# Patient Record
Sex: MALE | Race: WHITE | NOT HISPANIC OR LATINO
[De-identification: names, ages, dates, MRNs, and addresses within clinical notes are randomized per-mention and may not be internally consistent; named-entity substitution may affect disease eponyms.]

---

## 2017-01-09 ENCOUNTER — APPOINTMENT (OUTPATIENT)
Dept: CARDIOLOGY | Facility: CLINIC | Age: 57
End: 2017-01-09

## 2017-01-09 ENCOUNTER — NON-APPOINTMENT (OUTPATIENT)
Age: 57
End: 2017-01-09

## 2017-01-09 VITALS
DIASTOLIC BLOOD PRESSURE: 68 MMHG | OXYGEN SATURATION: 98 % | HEART RATE: 87 BPM | BODY MASS INDEX: 29.52 KG/M2 | HEIGHT: 77 IN | TEMPERATURE: 97.9 F | WEIGHT: 250 LBS | SYSTOLIC BLOOD PRESSURE: 110 MMHG

## 2017-01-23 ENCOUNTER — APPOINTMENT (OUTPATIENT)
Dept: CARDIOLOGY | Facility: CLINIC | Age: 57
End: 2017-01-23

## 2017-02-24 ENCOUNTER — APPOINTMENT (OUTPATIENT)
Dept: CARDIOLOGY | Facility: CLINIC | Age: 57
End: 2017-02-24

## 2017-04-06 ENCOUNTER — MEDICATION RENEWAL (OUTPATIENT)
Age: 57
End: 2017-04-06

## 2017-04-17 ENCOUNTER — APPOINTMENT (OUTPATIENT)
Dept: CARDIOLOGY | Facility: CLINIC | Age: 57
End: 2017-04-17

## 2017-04-17 RX ADMIN — REGADENOSON 0.4 MG/5ML: 0.08 INJECTION, SOLUTION INTRAVENOUS at 00:00

## 2017-04-20 ENCOUNTER — APPOINTMENT (OUTPATIENT)
Dept: CARDIOLOGY | Facility: CLINIC | Age: 57
End: 2017-04-20

## 2017-04-20 VITALS
HEART RATE: 101 BPM | DIASTOLIC BLOOD PRESSURE: 75 MMHG | WEIGHT: 249 LBS | BODY MASS INDEX: 29.4 KG/M2 | OXYGEN SATURATION: 98 % | SYSTOLIC BLOOD PRESSURE: 111 MMHG | HEIGHT: 77 IN

## 2017-08-24 ENCOUNTER — APPOINTMENT (OUTPATIENT)
Dept: CARDIOLOGY | Facility: CLINIC | Age: 57
End: 2017-08-24
Payer: COMMERCIAL

## 2017-08-24 ENCOUNTER — NON-APPOINTMENT (OUTPATIENT)
Age: 57
End: 2017-08-24

## 2017-08-24 VITALS
DIASTOLIC BLOOD PRESSURE: 86 MMHG | BODY MASS INDEX: 29.99 KG/M2 | SYSTOLIC BLOOD PRESSURE: 135 MMHG | HEIGHT: 77 IN | OXYGEN SATURATION: 98 % | HEART RATE: 92 BPM | WEIGHT: 254 LBS

## 2017-08-24 PROCEDURE — 93000 ELECTROCARDIOGRAM COMPLETE: CPT | Mod: 59

## 2017-08-24 PROCEDURE — 93289 INTERROG DEVICE EVAL HEART: CPT

## 2017-08-24 PROCEDURE — 99214 OFFICE O/P EST MOD 30 MIN: CPT | Mod: 25

## 2017-11-09 ENCOUNTER — APPOINTMENT (OUTPATIENT)
Dept: CARDIOLOGY | Facility: CLINIC | Age: 57
End: 2017-11-09
Payer: COMMERCIAL

## 2017-11-09 ENCOUNTER — NON-APPOINTMENT (OUTPATIENT)
Age: 57
End: 2017-11-09

## 2017-11-09 VITALS
SYSTOLIC BLOOD PRESSURE: 110 MMHG | BODY MASS INDEX: 30.34 KG/M2 | HEART RATE: 90 BPM | WEIGHT: 257 LBS | DIASTOLIC BLOOD PRESSURE: 74 MMHG | HEIGHT: 77 IN | OXYGEN SATURATION: 97 %

## 2017-11-09 PROCEDURE — 93289 INTERROG DEVICE EVAL HEART: CPT

## 2017-11-09 PROCEDURE — 99214 OFFICE O/P EST MOD 30 MIN: CPT | Mod: 25

## 2017-11-09 PROCEDURE — 93000 ELECTROCARDIOGRAM COMPLETE: CPT | Mod: 59

## 2017-11-09 RX ORDER — BLOOD SUGAR DIAGNOSTIC
STRIP MISCELLANEOUS
Qty: 200 | Refills: 0 | Status: ACTIVE | COMMUNITY
Start: 2016-10-11

## 2018-01-05 ENCOUNTER — EMERGENCY (EMERGENCY)
Facility: HOSPITAL | Age: 58
LOS: 1 days | Discharge: HOME | End: 2018-01-05
Attending: EMERGENCY MEDICINE | Admitting: EMERGENCY MEDICINE
Payer: COMMERCIAL

## 2018-01-05 VITALS
SYSTOLIC BLOOD PRESSURE: 142 MMHG | DIASTOLIC BLOOD PRESSURE: 87 MMHG | RESPIRATION RATE: 17 BRPM | HEART RATE: 97 BPM | TEMPERATURE: 98 F | OXYGEN SATURATION: 98 %

## 2018-01-05 VITALS
SYSTOLIC BLOOD PRESSURE: 139 MMHG | TEMPERATURE: 98 F | HEART RATE: 94 BPM | RESPIRATION RATE: 18 BRPM | OXYGEN SATURATION: 98 % | DIASTOLIC BLOOD PRESSURE: 85 MMHG

## 2018-01-05 LAB
ALBUMIN SERPL ELPH-MCNC: 4.4 G/DL — SIGNIFICANT CHANGE UP (ref 3.3–5)
ALP SERPL-CCNC: 80 U/L — SIGNIFICANT CHANGE UP (ref 40–120)
ALT FLD-CCNC: 39 U/L RC — SIGNIFICANT CHANGE UP (ref 10–45)
ANION GAP SERPL CALC-SCNC: 14 MMOL/L — SIGNIFICANT CHANGE UP (ref 5–17)
APTT BLD: 31.2 SEC — SIGNIFICANT CHANGE UP (ref 27.5–37.4)
AST SERPL-CCNC: 25 U/L — SIGNIFICANT CHANGE UP (ref 10–40)
BASOPHILS # BLD AUTO: 0.1 K/UL — SIGNIFICANT CHANGE UP (ref 0–0.2)
BASOPHILS NFR BLD AUTO: 0.8 % — SIGNIFICANT CHANGE UP (ref 0–2)
BILIRUB SERPL-MCNC: 0.4 MG/DL — SIGNIFICANT CHANGE UP (ref 0.2–1.2)
BUN SERPL-MCNC: 22 MG/DL — SIGNIFICANT CHANGE UP (ref 7–23)
CALCIUM SERPL-MCNC: 10.1 MG/DL — SIGNIFICANT CHANGE UP (ref 8.4–10.5)
CHLORIDE SERPL-SCNC: 101 MMOL/L — SIGNIFICANT CHANGE UP (ref 96–108)
CK MB CFR SERPL CALC: 6.9 NG/ML — HIGH (ref 0–6.7)
CO2 SERPL-SCNC: 22 MMOL/L — SIGNIFICANT CHANGE UP (ref 22–31)
CREAT SERPL-MCNC: 1.27 MG/DL — SIGNIFICANT CHANGE UP (ref 0.5–1.3)
EOSINOPHIL # BLD AUTO: 0.1 K/UL — SIGNIFICANT CHANGE UP (ref 0–0.5)
EOSINOPHIL NFR BLD AUTO: 1.5 % — SIGNIFICANT CHANGE UP (ref 0–6)
GLUCOSE SERPL-MCNC: 168 MG/DL — HIGH (ref 70–99)
HCT VFR BLD CALC: 44.4 % — SIGNIFICANT CHANGE UP (ref 39–50)
HGB BLD-MCNC: 15.6 G/DL — SIGNIFICANT CHANGE UP (ref 13–17)
INR BLD: 1.04 RATIO — SIGNIFICANT CHANGE UP (ref 0.88–1.16)
LYMPHOCYTES # BLD AUTO: 2.2 K/UL — SIGNIFICANT CHANGE UP (ref 1–3.3)
LYMPHOCYTES # BLD AUTO: 21.7 % — SIGNIFICANT CHANGE UP (ref 13–44)
MCHC RBC-ENTMCNC: 28.7 PG — SIGNIFICANT CHANGE UP (ref 27–34)
MCHC RBC-ENTMCNC: 35.1 GM/DL — SIGNIFICANT CHANGE UP (ref 32–36)
MCV RBC AUTO: 81.9 FL — SIGNIFICANT CHANGE UP (ref 80–100)
MONOCYTES # BLD AUTO: 0.8 K/UL — SIGNIFICANT CHANGE UP (ref 0–0.9)
MONOCYTES NFR BLD AUTO: 7.9 % — SIGNIFICANT CHANGE UP (ref 2–14)
NEUTROPHILS # BLD AUTO: 6.8 K/UL — SIGNIFICANT CHANGE UP (ref 1.8–7.4)
NEUTROPHILS NFR BLD AUTO: 68.2 % — SIGNIFICANT CHANGE UP (ref 43–77)
NT-PROBNP SERPL-SCNC: 123 PG/ML — SIGNIFICANT CHANGE UP (ref 0–300)
PLATELET # BLD AUTO: 177 K/UL — SIGNIFICANT CHANGE UP (ref 150–400)
POTASSIUM SERPL-MCNC: 4.2 MMOL/L — SIGNIFICANT CHANGE UP (ref 3.5–5.3)
POTASSIUM SERPL-SCNC: 4.2 MMOL/L — SIGNIFICANT CHANGE UP (ref 3.5–5.3)
PROT SERPL-MCNC: 7.8 G/DL — SIGNIFICANT CHANGE UP (ref 6–8.3)
PROTHROM AB SERPL-ACNC: 11.2 SEC — SIGNIFICANT CHANGE UP (ref 9.8–12.7)
RBC # BLD: 5.43 M/UL — SIGNIFICANT CHANGE UP (ref 4.2–5.8)
RBC # FLD: 13.4 % — SIGNIFICANT CHANGE UP (ref 10.3–14.5)
SODIUM SERPL-SCNC: 137 MMOL/L — SIGNIFICANT CHANGE UP (ref 135–145)
TROPONIN T SERPL-MCNC: <0.01 NG/ML — SIGNIFICANT CHANGE UP (ref 0–0.06)
WBC # BLD: 9.9 K/UL — SIGNIFICANT CHANGE UP (ref 3.8–10.5)
WBC # FLD AUTO: 9.9 K/UL — SIGNIFICANT CHANGE UP (ref 3.8–10.5)

## 2018-01-05 PROCEDURE — 93010 ELECTROCARDIOGRAM REPORT: CPT

## 2018-01-05 PROCEDURE — 85610 PROTHROMBIN TIME: CPT

## 2018-01-05 PROCEDURE — 99285 EMERGENCY DEPT VISIT HI MDM: CPT | Mod: 25

## 2018-01-05 PROCEDURE — 99283 EMERGENCY DEPT VISIT LOW MDM: CPT | Mod: 25

## 2018-01-05 PROCEDURE — 83880 ASSAY OF NATRIURETIC PEPTIDE: CPT

## 2018-01-05 PROCEDURE — 71045 X-RAY EXAM CHEST 1 VIEW: CPT | Mod: 26

## 2018-01-05 PROCEDURE — 84484 ASSAY OF TROPONIN QUANT: CPT

## 2018-01-05 PROCEDURE — 80053 COMPREHEN METABOLIC PANEL: CPT

## 2018-01-05 PROCEDURE — 93005 ELECTROCARDIOGRAM TRACING: CPT

## 2018-01-05 PROCEDURE — 82553 CREATINE MB FRACTION: CPT

## 2018-01-05 PROCEDURE — 85730 THROMBOPLASTIN TIME PARTIAL: CPT

## 2018-01-05 PROCEDURE — 85027 COMPLETE CBC AUTOMATED: CPT

## 2018-01-05 PROCEDURE — 71045 X-RAY EXAM CHEST 1 VIEW: CPT

## 2018-01-05 NOTE — ED ADULT NURSE NOTE - OBJECTIVE STATEMENT
57 year old male presents ambulatory to ED through waiting room complaining of shortness of breath. History of DM, CAD with stents, AICD. States he was using  and started feeling unwell. Sat down for half an hour, felt better and continued clearing snow. Wife states he appeared short of breath. Patient endorses difficulty catching breath. Denies HA, CP, abd pain, NVD, fevers, chills. Patient undressed and placed into gown, call bell in hand and side rails up with bed in lowest position for safety. blanket provided. Comfort and safety provided.

## 2018-01-05 NOTE — ED PROVIDER NOTE - OBJECTIVE STATEMENT
Dr. Davidson Note: 57M with 2 episodes, yesterday and then today, on dyspnea while exerting himself in the extreme cold, resolved after returning indoors.  Does NOT have dyspnea on exertion otherwise, able to go down the caban, up the stairs without dyspnea nor chest pain.  Never had chest pain during this.  Put on his nicotene patch and took Proair once thinking this may help as well.

## 2018-01-05 NOTE — ED PROVIDER NOTE - MEDICAL DECISION MAKING DETAILS
Dr. Davidson Note: likely cold induced bronchospasm, but given cardiac hx, will exclude acute ACS, deemed low risk at this time given clinical picture and normal EKG.

## 2018-02-05 ENCOUNTER — INPATIENT (INPATIENT)
Facility: HOSPITAL | Age: 58
LOS: 2 days | Discharge: HOME | DRG: 247 | End: 2018-02-08
Attending: STUDENT IN AN ORGANIZED HEALTH CARE EDUCATION/TRAINING PROGRAM | Admitting: INTERNAL MEDICINE
Payer: MEDICARE

## 2018-02-05 VITALS — WEIGHT: 249.12 LBS

## 2018-02-05 DIAGNOSIS — E11.9 TYPE 2 DIABETES MELLITUS WITHOUT COMPLICATIONS: ICD-10-CM

## 2018-02-05 DIAGNOSIS — Z95.810 PRESENCE OF AUTOMATIC (IMPLANTABLE) CARDIAC DEFIBRILLATOR: ICD-10-CM

## 2018-02-05 DIAGNOSIS — I21.3 ST ELEVATION (STEMI) MYOCARDIAL INFARCTION OF UNSPECIFIED SITE: ICD-10-CM

## 2018-02-05 LAB
ALBUMIN SERPL ELPH-MCNC: 4.4 G/DL — SIGNIFICANT CHANGE UP (ref 3.3–5)
ALP SERPL-CCNC: 88 U/L — SIGNIFICANT CHANGE UP (ref 40–120)
ALT FLD-CCNC: 34 U/L RC — SIGNIFICANT CHANGE UP (ref 10–45)
ANION GAP SERPL CALC-SCNC: 13 MMOL/L — SIGNIFICANT CHANGE UP (ref 5–17)
APTT BLD: 31.1 SEC — SIGNIFICANT CHANGE UP (ref 27.5–37.4)
AST SERPL-CCNC: 16 U/L — SIGNIFICANT CHANGE UP (ref 10–40)
BASOPHILS # BLD AUTO: 0.1 K/UL — SIGNIFICANT CHANGE UP (ref 0–0.2)
BASOPHILS NFR BLD AUTO: 0.6 % — SIGNIFICANT CHANGE UP (ref 0–2)
BILIRUB SERPL-MCNC: 0.3 MG/DL — SIGNIFICANT CHANGE UP (ref 0.2–1.2)
BUN SERPL-MCNC: 20 MG/DL — SIGNIFICANT CHANGE UP (ref 7–23)
CALCIUM SERPL-MCNC: 9.7 MG/DL — SIGNIFICANT CHANGE UP (ref 8.4–10.5)
CHLORIDE SERPL-SCNC: 98 MMOL/L — SIGNIFICANT CHANGE UP (ref 96–108)
CK MB BLD-MCNC: 2.6 % — SIGNIFICANT CHANGE UP (ref 0–3.5)
CK MB CFR SERPL CALC: 8.5 NG/ML — HIGH (ref 0–6.7)
CK SERPL-CCNC: 323 U/L — HIGH (ref 30–200)
CO2 SERPL-SCNC: 23 MMOL/L — SIGNIFICANT CHANGE UP (ref 22–31)
CREAT SERPL-MCNC: 1.36 MG/DL — HIGH (ref 0.5–1.3)
EOSINOPHIL # BLD AUTO: 0.1 K/UL — SIGNIFICANT CHANGE UP (ref 0–0.5)
EOSINOPHIL NFR BLD AUTO: 1.4 % — SIGNIFICANT CHANGE UP (ref 0–6)
GLUCOSE BLDC GLUCOMTR-MCNC: 283 MG/DL — HIGH (ref 70–99)
GLUCOSE SERPL-MCNC: 311 MG/DL — HIGH (ref 70–99)
HCT VFR BLD CALC: 45 % — SIGNIFICANT CHANGE UP (ref 39–50)
HGB BLD-MCNC: 16.1 G/DL — SIGNIFICANT CHANGE UP (ref 13–17)
INR BLD: 1.14 RATIO — SIGNIFICANT CHANGE UP (ref 0.88–1.16)
LYMPHOCYTES # BLD AUTO: 2.8 K/UL — SIGNIFICANT CHANGE UP (ref 1–3.3)
LYMPHOCYTES # BLD AUTO: 26.4 % — SIGNIFICANT CHANGE UP (ref 13–44)
MCHC RBC-ENTMCNC: 28.9 PG — SIGNIFICANT CHANGE UP (ref 27–34)
MCHC RBC-ENTMCNC: 35.8 GM/DL — SIGNIFICANT CHANGE UP (ref 32–36)
MCV RBC AUTO: 80.7 FL — SIGNIFICANT CHANGE UP (ref 80–100)
MONOCYTES # BLD AUTO: 1 K/UL — HIGH (ref 0–0.9)
MONOCYTES NFR BLD AUTO: 9.7 % — SIGNIFICANT CHANGE UP (ref 2–14)
NEUTROPHILS # BLD AUTO: 6.6 K/UL — SIGNIFICANT CHANGE UP (ref 1.8–7.4)
NEUTROPHILS NFR BLD AUTO: 62 % — SIGNIFICANT CHANGE UP (ref 43–77)
NT-PROBNP SERPL-SCNC: 300 PG/ML — SIGNIFICANT CHANGE UP (ref 0–300)
PLATELET # BLD AUTO: 205 K/UL — SIGNIFICANT CHANGE UP (ref 150–400)
POTASSIUM SERPL-MCNC: 4.2 MMOL/L — SIGNIFICANT CHANGE UP (ref 3.5–5.3)
POTASSIUM SERPL-SCNC: 4.2 MMOL/L — SIGNIFICANT CHANGE UP (ref 3.5–5.3)
PROT SERPL-MCNC: 7.9 G/DL — SIGNIFICANT CHANGE UP (ref 6–8.3)
PROTHROM AB SERPL-ACNC: 12.3 SEC — SIGNIFICANT CHANGE UP (ref 9.8–12.7)
RBC # BLD: 5.57 M/UL — SIGNIFICANT CHANGE UP (ref 4.2–5.8)
RBC # FLD: 13.3 % — SIGNIFICANT CHANGE UP (ref 10.3–14.5)
SODIUM SERPL-SCNC: 134 MMOL/L — LOW (ref 135–145)
TROPONIN T SERPL-MCNC: <0.01 NG/ML — SIGNIFICANT CHANGE UP (ref 0–0.06)
WBC # BLD: 10.7 K/UL — HIGH (ref 3.8–10.5)
WBC # FLD AUTO: 10.7 K/UL — HIGH (ref 3.8–10.5)

## 2018-02-05 PROCEDURE — 92941 PRQ TRLML REVSC TOT OCCL AMI: CPT | Mod: RI

## 2018-02-05 PROCEDURE — 99291 CRITICAL CARE FIRST HOUR: CPT | Mod: 25

## 2018-02-05 PROCEDURE — 93010 ELECTROCARDIOGRAM REPORT: CPT

## 2018-02-05 PROCEDURE — 71045 X-RAY EXAM CHEST 1 VIEW: CPT | Mod: 26

## 2018-02-05 PROCEDURE — 99223 1ST HOSP IP/OBS HIGH 75: CPT | Mod: GC,25

## 2018-02-05 PROCEDURE — 99152 MOD SED SAME PHYS/QHP 5/>YRS: CPT

## 2018-02-05 PROCEDURE — 93458 L HRT ARTERY/VENTRICLE ANGIO: CPT | Mod: 26,59

## 2018-02-05 RX ORDER — METFORMIN HYDROCHLORIDE 850 MG/1
0 TABLET ORAL
Qty: 0 | Refills: 0 | COMMUNITY

## 2018-02-05 RX ORDER — HEPARIN SODIUM 5000 [USP'U]/ML
INJECTION INTRAVENOUS; SUBCUTANEOUS
Qty: 25000 | Refills: 0 | Status: DISCONTINUED | OUTPATIENT
Start: 2018-02-05 | End: 2018-02-05

## 2018-02-05 RX ORDER — TAMSULOSIN HYDROCHLORIDE 0.4 MG/1
0.4 CAPSULE ORAL AT BEDTIME
Qty: 0 | Refills: 0 | Status: DISCONTINUED | OUTPATIENT
Start: 2018-02-05 | End: 2018-02-08

## 2018-02-05 RX ORDER — NITROGLYCERIN 6.5 MG
0.4 CAPSULE, EXTENDED RELEASE ORAL
Qty: 0 | Refills: 0 | Status: DISCONTINUED | OUTPATIENT
Start: 2018-02-05 | End: 2018-02-05

## 2018-02-05 RX ORDER — INSULIN LISPRO 100/ML
VIAL (ML) SUBCUTANEOUS
Qty: 0 | Refills: 0 | Status: DISCONTINUED | OUTPATIENT
Start: 2018-02-05 | End: 2018-02-06

## 2018-02-05 RX ORDER — ASPIRIN/CALCIUM CARB/MAGNESIUM 324 MG
81 TABLET ORAL DAILY
Qty: 0 | Refills: 0 | Status: DISCONTINUED | OUTPATIENT
Start: 2018-02-05 | End: 2018-02-08

## 2018-02-05 RX ORDER — DEXTROSE 50 % IN WATER 50 %
12.5 SYRINGE (ML) INTRAVENOUS ONCE
Qty: 0 | Refills: 0 | Status: DISCONTINUED | OUTPATIENT
Start: 2018-02-05 | End: 2018-02-08

## 2018-02-05 RX ORDER — DEXTROSE 50 % IN WATER 50 %
1 SYRINGE (ML) INTRAVENOUS ONCE
Qty: 0 | Refills: 0 | Status: DISCONTINUED | OUTPATIENT
Start: 2018-02-05 | End: 2018-02-08

## 2018-02-05 RX ORDER — INSULIN GLARGINE 100 [IU]/ML
25 INJECTION, SOLUTION SUBCUTANEOUS EVERY MORNING
Qty: 0 | Refills: 0 | Status: DISCONTINUED | OUTPATIENT
Start: 2018-02-05 | End: 2018-02-06

## 2018-02-05 RX ORDER — SODIUM CHLORIDE 9 MG/ML
1000 INJECTION, SOLUTION INTRAVENOUS
Qty: 0 | Refills: 0 | Status: DISCONTINUED | OUTPATIENT
Start: 2018-02-05 | End: 2018-02-08

## 2018-02-05 RX ORDER — ATORVASTATIN CALCIUM 80 MG/1
80 TABLET, FILM COATED ORAL AT BEDTIME
Qty: 0 | Refills: 0 | Status: DISCONTINUED | OUTPATIENT
Start: 2018-02-05 | End: 2018-02-08

## 2018-02-05 RX ORDER — DEXTROSE 50 % IN WATER 50 %
25 SYRINGE (ML) INTRAVENOUS ONCE
Qty: 0 | Refills: 0 | Status: DISCONTINUED | OUTPATIENT
Start: 2018-02-05 | End: 2018-02-08

## 2018-02-05 RX ORDER — HEPARIN SODIUM 5000 [USP'U]/ML
4000 INJECTION INTRAVENOUS; SUBCUTANEOUS ONCE
Qty: 0 | Refills: 0 | Status: COMPLETED | OUTPATIENT
Start: 2018-02-05 | End: 2018-02-05

## 2018-02-05 RX ORDER — NICOTINE POLACRILEX 2 MG
1 GUM BUCCAL DAILY
Qty: 0 | Refills: 0 | Status: DISCONTINUED | OUTPATIENT
Start: 2018-02-05 | End: 2018-02-08

## 2018-02-05 RX ORDER — CARVEDILOL PHOSPHATE 80 MG/1
6.25 CAPSULE, EXTENDED RELEASE ORAL EVERY 12 HOURS
Qty: 0 | Refills: 0 | Status: DISCONTINUED | OUTPATIENT
Start: 2018-02-05 | End: 2018-02-06

## 2018-02-05 RX ORDER — LOSARTAN POTASSIUM 100 MG/1
0 TABLET, FILM COATED ORAL
Qty: 0 | Refills: 0 | COMMUNITY

## 2018-02-05 RX ORDER — CLOPIDOGREL BISULFATE 75 MG/1
75 TABLET, FILM COATED ORAL DAILY
Qty: 0 | Refills: 0 | Status: DISCONTINUED | OUTPATIENT
Start: 2018-02-05 | End: 2018-02-06

## 2018-02-05 RX ORDER — INSULIN GLARGINE 100 [IU]/ML
50 INJECTION, SOLUTION SUBCUTANEOUS AT BEDTIME
Qty: 0 | Refills: 0 | Status: DISCONTINUED | OUTPATIENT
Start: 2018-02-05 | End: 2018-02-06

## 2018-02-05 RX ORDER — INSULIN LISPRO 100/ML
3 VIAL (ML) SUBCUTANEOUS ONCE
Qty: 0 | Refills: 0 | Status: COMPLETED | OUTPATIENT
Start: 2018-02-05 | End: 2018-02-05

## 2018-02-05 RX ORDER — TICAGRELOR 90 MG/1
180 TABLET ORAL ONCE
Qty: 0 | Refills: 0 | Status: COMPLETED | OUTPATIENT
Start: 2018-02-05 | End: 2018-02-05

## 2018-02-05 RX ORDER — INSULIN LISPRO 100/ML
VIAL (ML) SUBCUTANEOUS AT BEDTIME
Qty: 0 | Refills: 0 | Status: DISCONTINUED | OUTPATIENT
Start: 2018-02-05 | End: 2018-02-06

## 2018-02-05 RX ORDER — GLUCAGON INJECTION, SOLUTION 0.5 MG/.1ML
1 INJECTION, SOLUTION SUBCUTANEOUS ONCE
Qty: 0 | Refills: 0 | Status: DISCONTINUED | OUTPATIENT
Start: 2018-02-05 | End: 2018-02-08

## 2018-02-05 RX ORDER — INFLUENZA VIRUS VACCINE 15; 15; 15; 15 UG/.5ML; UG/.5ML; UG/.5ML; UG/.5ML
0.5 SUSPENSION INTRAMUSCULAR ONCE
Qty: 0 | Refills: 0 | Status: COMPLETED | OUTPATIENT
Start: 2018-02-05 | End: 2018-02-08

## 2018-02-05 RX ADMIN — Medication 0.4 MILLIGRAM(S): at 16:03

## 2018-02-05 RX ADMIN — INSULIN GLARGINE 50 UNIT(S): 100 INJECTION, SOLUTION SUBCUTANEOUS at 22:01

## 2018-02-05 RX ADMIN — Medication 2: at 22:03

## 2018-02-05 RX ADMIN — HEPARIN SODIUM 4000 UNIT(S): 5000 INJECTION INTRAVENOUS; SUBCUTANEOUS at 16:03

## 2018-02-05 RX ADMIN — ATORVASTATIN CALCIUM 80 MILLIGRAM(S): 80 TABLET, FILM COATED ORAL at 21:48

## 2018-02-05 RX ADMIN — TICAGRELOR 180 MILLIGRAM(S): 90 TABLET ORAL at 16:03

## 2018-02-05 RX ADMIN — Medication 30 MILLILITER(S): at 23:53

## 2018-02-05 RX ADMIN — TAMSULOSIN HYDROCHLORIDE 0.4 MILLIGRAM(S): 0.4 CAPSULE ORAL at 22:04

## 2018-02-05 NOTE — ED PROVIDER NOTE - OBJECTIVE STATEMENT
attending Quinten: 57yM smoker with h/o CAD/MI s/p 3 stents, AICD, DMII on insulin sent in by PMD for chest pain since last night, nonexertional, mid sternal, squeezing with radiation to RUE. Given full dose ASA in office. Last stent placed in 2012.

## 2018-02-05 NOTE — ED PROVIDER NOTE - PROGRESS NOTE DETAILS
attending Quinten: As per note from PMD [facility] office, pt to be admitted to Dr. Brewer. Voicemail left for Dr. Brewer. attending Quinten: Pt with another episode of chest pain. Will repeat EKG and call cards for cath consult.

## 2018-02-05 NOTE — ED ADULT NURSE NOTE - OBJECTIVE STATEMENT
57y m pt arrived in er c/o chest pain to center chest; pt has hx of cardiac stent and has aicd present; pt states pain comes and goes; aox3; no resp distress; no abd pain; no n/v/d; no fever/chills; pt denies sob; denies diaphoresis; cardiac resident at bedside; iv placed; labs drawn; pt medicated; resident and er tech took pt to cath lab

## 2018-02-05 NOTE — H&P ADULT - HISTORY OF PRESENT ILLNESS
This is a 57 year old man Active Smoker with past medical history of MI/CAD with Stents, DMT2 requiring Insulin, HFrEF with ICD, HTN, HLD presents to [facility] ED from MD office with intermittent, non-radiating midsternal chest pain X 1 day in early stages 5/10 relieved with belching on presentation to MD office started radiating to right upper arm with intensity increasing to 10/10.  In ED, ECG showed VTE in V2-V3, received Aspirin, Plavix, Heparin Load and brought urgently to cath lab receiving NERI X 1 to Ramus via Right Radial Artery This is a 57 year old man Active Smoker with past medical history of MI/CAD with Stents, DMT2 requiring Insulin, HFrEF (37%) with ICD ([employer]), HTN, HLD presents to [facility] ED from MD office with intermittent, non-radiating midsternal chest pain X 1 day in early stages 5/10 relieved with belching on presentation to MD office started radiating to right upper arm with intensity increasing to 10/10.  In ED, ECG showed VTE in V2-V3, received Aspirin, Plavix, Heparin Load and brought urgently to cath lab receiving NERI X 1 to Ramus via Right Radial Artery.  Patient states that several family members have the flu.  He denies fever, chills, cough, SOB, abdominal pain, N/V/D.

## 2018-02-05 NOTE — CHART NOTE - NSCHARTNOTEFT_GEN_A_CORE
This is a 57 year old man Active Smoker with past medical history of MI/CAD with Stents, DMT2 requiring Insulin, HFrEF (37%) with ICD ([employer]. Marc), HTN, HLD presents to [facility] ED from MD office with intermittent, non-radiating midsternal chest pain X 1 day in early stages 5/10 relieved with belching on presentation to MD office started radiating to right upper arm with intensity increasing to 10/10.  In ED, ECG showed VTE in V2-V3, received Aspirin, Plavix, Heparin Load and brought urgently to cath lab receiving NERI X 1 to Ramus via Right Radial Arterial access.  R Radial Arterial Band removed.  20 minutes of manual compression applied until hemostasis achieved.  Distal digits with <3 seconds capillary refill.  Denies pain, tingling, numbness.    Mike Fisher, ANP

## 2018-02-05 NOTE — H&P ADULT - NSHPPHYSICALEXAM_GEN_ALL_CORE
General:   Alert, oriented X 3and cooperative. No acute distress    Skin:  Normal in appearance, texture, and temperature     HEENT:  Scalp normal. Pupils equally round, 4 mm, reactive to light and accommodation, sclera and conjunctiva normal. Nasal mucosa normal. Oral pharynx is normal without erythema or exudate. Tongue and gums are normal.    Neck:  No abnormal adenopathy in the cervical or supraclavicular areas. Trachea is midline and thyroid gland is normal without masses. No JVD    Chest:  Lungs are clear to auscultation and percussion bilaterally S1S2 RRR    Abdomen:  The abdomen is symmetrical without distention; bowel sounds are normal in quality and intensity in all areas. No hepatomegaly.    Extremities:  Right Radial Artery Band. No cyanosis, clubbing, or edema are noted. Peripheral pulses in anterior tibial, dorsalis pedis, brachial, and radial areas are normal.

## 2018-02-05 NOTE — CHART NOTE - NSCHARTNOTEFT_GEN_A_CORE
====================  CCU MIDNIGHT ROUNDS  ====================    SEVERIANO MARTINEZ  [id number]  Patient is a 57y old  Male who presents with a chief complaint of Chest Pain, AWSTEMI (05 Feb 2018 18:22)      ====================  SUMMARY:  ====================      ====================  NEW EVENTS:  ====================    MEDICATIONS  (STANDING):  aspirin  chewable 81 milliGRAM(s) Oral daily  atorvastatin 80 milliGRAM(s) Oral at bedtime  carvedilol 6.25 milliGRAM(s) Oral every 12 hours  clopidogrel Tablet 75 milliGRAM(s) Oral daily  dextrose 5%. 1000 milliLiter(s) (50 mL/Hr) IV Continuous <Continuous>  dextrose 50% Injectable 12.5 Gram(s) IV Push once  dextrose 50% Injectable 25 Gram(s) IV Push once  dextrose 50% Injectable 25 Gram(s) IV Push once  influenza   Vaccine 0.5 milliLiter(s) IntraMuscular once  insulin glargine Injectable (LANTUS) 25 Unit(s) SubCutaneous every morning  insulin glargine Injectable (LANTUS) 50 Unit(s) SubCutaneous at bedtime  insulin lispro (HumaLOG) corrective regimen sliding scale   SubCutaneous three times a day before meals  insulin lispro (HumaLOG) corrective regimen sliding scale   SubCutaneous at bedtime  nicotine -  14 mG/24Hr(s) Patch 1 patch Transdermal daily  tamsulosin 0.4 milliGRAM(s) Oral at bedtime    MEDICATIONS  (PRN):  dextrose Gel 1 Dose(s) Oral once PRN Blood Glucose LESS THAN 70 milliGRAM(s)/deciLiter  glucagon  Injectable 1 milliGRAM(s) IntraMuscular once PRN Glucose <70 milliGRAM(s)/deciLiter      ====================  VITALS (Last 12 hrs):  ====================    T(C): 36.8 (02-05-18 @ 19:00), Max: 36.9 (02-05-18 @ 17:55)  T(F): 98.3 (02-05-18 @ 19:00), Max: 98.5 (02-05-18 @ 17:55)  HR: 98 (02-05-18 @ 22:00) (92 - 104)  BP: 113/60 (02-05-18 @ 22:00) (107/73 - 153/60)  BP(mean): 80 (02-05-18 @ 22:00) (80 - 106)  ABP: --  ABP(mean): --  RR: 20 (02-05-18 @ 22:00) (17 - 28)  SpO2: 96% (02-05-18 @ 22:00) (96% - 98%)  Wt(kg): --  CVP(mm Hg): --  CVP(cm H2O): --  CO: --  CI: --  PA: --  PA(mean): --  PCWP: --  SVR: --  PVR: --    I&O's Summary          ====================  NEW LABS:  ====================      02-05    134<L>  |  98  |  20  ----------------------------<  311<H>  4.2   |  23  |  1.36<H>    Ca    9.7      05 Feb 2018 16:10    TPro  7.9  /  Alb  4.4  /  TBili  0.3  /  DBili  x   /  AST  16  /  ALT  34  /  AlkPhos  88  02-05    PT/INR - ( 05 Feb 2018 16:10 )   PT: 12.3 sec;   INR: 1.14 ratio         PTT - ( 05 Feb 2018 16:10 )  PTT:31.1 sec  Troponin T, Serum: <0.01 ng/mL (02-05-18 @ 16:10)  Creatine Kinase, Serum: 323 U/L <H> (02-05-18 @ 16:10)    CKMB Units: 8.5 ng/mL (02-05 @ 16:10)        ====================  PLAN:  ====================  -       Rachel MILTONU NP  Beeper #[id number]  Spectra # [phone] ====================  CCU MIDNIGHT ROUNDS  ====================    SEVERIANO MARTINEZ  [id number]  Patient is a 57y old  Male who presents with a chief complaint of Chest Pain, AWSTEMI (05 Feb 2018 18:22)    ====================  SUMMARY:  ====================  This is a 57 year old man Active Smoker with past medical history of MI/CAD with Stents, DMT2 requiring Insulin, HFrEF (37%) with ICD ([employer]. Marc), HTN, HLD presents to [facility] ED from MD office with intermittent, non-radiating midsternal chest pain X 1 day in early stages 5/10 relieved with belching on presentation to MD office started radiating to right upper arm with intensity increasing to 10/10.  In ED, ECG showed VTE in V2-V3, received Aspirin, Plavix, Heparin Load and brought urgently to cath lab receiving NERI X 1 to Ramus via Right Radial Artery.    ====================  NEW EVENTS:  ====================  Denies CP, VSS, no cardiac arrhythmias    MEDICATIONS  (STANDING):  aspirin  chewable 81 milliGRAM(s) Oral daily  atorvastatin 80 milliGRAM(s) Oral at bedtime  carvedilol 6.25 milliGRAM(s) Oral every 12 hours  clopidogrel Tablet 75 milliGRAM(s) Oral daily  dextrose 5%. 1000 milliLiter(s) (50 mL/Hr) IV Continuous <Continuous>  dextrose 50% Injectable 12.5 Gram(s) IV Push once  dextrose 50% Injectable 25 Gram(s) IV Push once  dextrose 50% Injectable 25 Gram(s) IV Push once  influenza   Vaccine 0.5 milliLiter(s) IntraMuscular once  insulin glargine Injectable (LANTUS) 25 Unit(s) SubCutaneous every morning  insulin glargine Injectable (LANTUS) 50 Unit(s) SubCutaneous at bedtime  insulin lispro (HumaLOG) corrective regimen sliding scale   SubCutaneous three times a day before meals  insulin lispro (HumaLOG) corrective regimen sliding scale   SubCutaneous at bedtime  nicotine -  14 mG/24Hr(s) Patch 1 patch Transdermal daily  tamsulosin 0.4 milliGRAM(s) Oral at bedtime    MEDICATIONS  (PRN):  dextrose Gel 1 Dose(s) Oral once PRN Blood Glucose LESS THAN 70 milliGRAM(s)/deciLiter  glucagon  Injectable 1 milliGRAM(s) IntraMuscular once PRN Glucose <70 milliGRAM(s)/deciLiter    ====================  VITALS (Last 12 hrs):  ====================    T(C): 36.8 (02-05-18 @ 19:00), Max: 36.9 (02-05-18 @ 17:55)  T(F): 98.3 (02-05-18 @ 19:00), Max: 98.5 (02-05-18 @ 17:55)  HR: 98 (02-05-18 @ 22:00) (92 - 104)  BP: 113/60 (02-05-18 @ 22:00) (107/73 - 153/60)  BP(mean): 80 (02-05-18 @ 22:00) (80 - 106)  ABP: --  ABP(mean): --  RR: 20 (02-05-18 @ 22:00) (17 - 28)  SpO2: 96% (02-05-18 @ 22:00) (96% - 98%)  Wt(kg): --  CVP(mm Hg): --  CVP(cm H2O): --  CO: --  CI: --  PA: --  PA(mean): --  PCWP: --  SVR: --  PVR: --    I&O's Summary    ====================  NEW LABS:  ====================    02-05    134<L>  |  98  |  20  ----------------------------<  311<H>  4.2   |  23  |  1.36<H>    Ca    9.7      05 Feb 2018 16:10    TPro  7.9  /  Alb  4.4  /  TBili  0.3  /  DBili  x   /  AST  16  /  ALT  34  /  AlkPhos  88  02-05    PT/INR - ( 05 Feb 2018 16:10 )   PT: 12.3 sec;   INR: 1.14 ratio      PTT - ( 05 Feb 2018 16:10 )  PTT:31.1 sec  Troponin T, Serum: <0.01 ng/mL (02-05-18 @ 16:10)  Creatine Kinase, Serum: 323 U/L <H> (02-05-18 @ 16:10)    CKMB Units: 8.5 ng/mL (02-05 @ 16:10)    ====================  PLAN:  ====================  - S/P NERI to RI (100%).  found to have 100%  in LAD but with collaterals  -  Cath showed EF 25%  - Continue DAPT, BB, and high-intensity statin  - Start ACEI when BP anf renal function stable  - Cardiac enzymes x 2 negative  - DVT ppx  - Ambulate as tolerated  - TTE    Rachel Graf CCU NP  Beeper #[id number]  Spectra # [phone]

## 2018-02-05 NOTE — H&P ADULT - NSHPREVIEWOFSYSTEMS_GEN_ALL_CORE
HEENT:  No complaints of headache change in vision, nose or ear problems, or sore throat.    Cadiovascular:  Intermittent CP that evolved from non-radiating to radiating to RUE X 1 day    Gastrointestinal:  No complaints of dysphagia, nausea, vomiting, or change in stool pattern, consistency, or color.     Genitourinary:  No complaints of dysuria, nocturia, polyuria, or hematuria,    Musculoskeletal:  No complaints of arthralgias, muscle aches, or pains.    Neurological:  No complaints of weakness, numbness, or incoordination.

## 2018-02-05 NOTE — H&P ADULT - PROBLEM SELECTOR PLAN 1
s/p NERI to Ramus  Continue ASA, Plavix, Lipitor  Consider adding Coreg and ARB when tolerating  DASH Diet  Smoke Cessation education  Nicoderm patch

## 2018-02-05 NOTE — ED PROVIDER NOTE - ATTENDING CONTRIBUTION TO CARE
attending Quinten: 57yM with h/o MI s/p 3 stents, AICD sent in by PMD for chest pain since last night, nonexertional, mid sternal, squeezing with radiation to RUE. Given full dose ASA in office. Last stent placed in 2012. attending Quinten: 57yM smoker with h/o CAD/MI s/p 3 stents, AICD, DMII on insulin sent in by PMD for chest pain since last night, nonexertional, mid sternal, squeezing with radiation to RUE. Given full dose ASA in office. Last stent placed in 2012. On arrival, pt with active chest pain, initial EKG with ?anterior ST segment elevations. Concern for ACS. Will call STAT cardiology consult for cath, labs including cardiac enzymes, tele monitor, ASA, heparin gtt and admit.

## 2018-02-06 ENCOUNTER — TRANSCRIPTION ENCOUNTER (OUTPATIENT)
Age: 58
End: 2018-02-06

## 2018-02-06 DIAGNOSIS — F17.200 NICOTINE DEPENDENCE, UNSPECIFIED, UNCOMPLICATED: ICD-10-CM

## 2018-02-06 DIAGNOSIS — E83.39 OTHER DISORDERS OF PHOSPHORUS METABOLISM: ICD-10-CM

## 2018-02-06 DIAGNOSIS — E11.22 TYPE 2 DIABETES MELLITUS WITH DIABETIC CHRONIC KIDNEY DISEASE: ICD-10-CM

## 2018-02-06 DIAGNOSIS — E78.5 HYPERLIPIDEMIA, UNSPECIFIED: ICD-10-CM

## 2018-02-06 DIAGNOSIS — I21.09 ST ELEVATION (STEMI) MYOCARDIAL INFARCTION INVOLVING OTHER CORONARY ARTERY OF ANTERIOR WALL: ICD-10-CM

## 2018-02-06 DIAGNOSIS — I10 ESSENTIAL (PRIMARY) HYPERTENSION: ICD-10-CM

## 2018-02-06 DIAGNOSIS — Z29.9 ENCOUNTER FOR PROPHYLACTIC MEASURES, UNSPECIFIED: ICD-10-CM

## 2018-02-06 LAB
ALBUMIN SERPL ELPH-MCNC: 3.9 G/DL — SIGNIFICANT CHANGE UP (ref 3.3–5)
ALBUMIN SERPL ELPH-MCNC: 4.4 G/DL — SIGNIFICANT CHANGE UP (ref 3.3–5)
ALP SERPL-CCNC: 80 U/L — SIGNIFICANT CHANGE UP (ref 40–120)
ALP SERPL-CCNC: 93 U/L — SIGNIFICANT CHANGE UP (ref 40–120)
ALT FLD-CCNC: 42 U/L RC — SIGNIFICANT CHANGE UP (ref 10–45)
ALT FLD-CCNC: 45 U/L RC — SIGNIFICANT CHANGE UP (ref 10–45)
ANION GAP SERPL CALC-SCNC: 13 MMOL/L — SIGNIFICANT CHANGE UP (ref 5–17)
ANION GAP SERPL CALC-SCNC: 15 MMOL/L — SIGNIFICANT CHANGE UP (ref 5–17)
APPEARANCE UR: CLEAR — SIGNIFICANT CHANGE UP
AST SERPL-CCNC: 115 U/L — HIGH (ref 10–40)
AST SERPL-CCNC: 90 U/L — HIGH (ref 10–40)
BILIRUB SERPL-MCNC: 0.3 MG/DL — SIGNIFICANT CHANGE UP (ref 0.2–1.2)
BILIRUB SERPL-MCNC: 0.5 MG/DL — SIGNIFICANT CHANGE UP (ref 0.2–1.2)
BILIRUB UR-MCNC: NEGATIVE — SIGNIFICANT CHANGE UP
BUN SERPL-MCNC: 17 MG/DL — SIGNIFICANT CHANGE UP (ref 7–23)
BUN SERPL-MCNC: 19 MG/DL — SIGNIFICANT CHANGE UP (ref 7–23)
CALCIUM SERPL-MCNC: 9.4 MG/DL — SIGNIFICANT CHANGE UP (ref 8.4–10.5)
CALCIUM SERPL-MCNC: 9.4 MG/DL — SIGNIFICANT CHANGE UP (ref 8.4–10.5)
CHLORIDE SERPL-SCNC: 100 MMOL/L — SIGNIFICANT CHANGE UP (ref 96–108)
CHLORIDE SERPL-SCNC: 97 MMOL/L — SIGNIFICANT CHANGE UP (ref 96–108)
CHOLEST SERPL-MCNC: 188 MG/DL — SIGNIFICANT CHANGE UP (ref 10–199)
CK MB BLD-MCNC: 4.2 % — HIGH (ref 0–3.5)
CK MB BLD-MCNC: 5.5 % — HIGH (ref 0–3.5)
CK MB CFR SERPL CALC: 37.6 NG/ML — HIGH (ref 0–6.7)
CK MB CFR SERPL CALC: 61.7 NG/ML — HIGH (ref 0–6.7)
CK SERPL-CCNC: 1128 U/L — HIGH (ref 30–200)
CK SERPL-CCNC: 890 U/L — HIGH (ref 30–200)
CO2 SERPL-SCNC: 22 MMOL/L — SIGNIFICANT CHANGE UP (ref 22–31)
CO2 SERPL-SCNC: 25 MMOL/L — SIGNIFICANT CHANGE UP (ref 22–31)
COLOR SPEC: YELLOW — SIGNIFICANT CHANGE UP
COMMENT - URINE: SIGNIFICANT CHANGE UP
CREAT SERPL-MCNC: 1.23 MG/DL — SIGNIFICANT CHANGE UP (ref 0.5–1.3)
CREAT SERPL-MCNC: 1.3 MG/DL — SIGNIFICANT CHANGE UP (ref 0.5–1.3)
DIFF PNL FLD: NEGATIVE — SIGNIFICANT CHANGE UP
GLUCOSE BLDC GLUCOMTR-MCNC: 239 MG/DL — HIGH (ref 70–99)
GLUCOSE BLDC GLUCOMTR-MCNC: 284 MG/DL — HIGH (ref 70–99)
GLUCOSE BLDC GLUCOMTR-MCNC: 303 MG/DL — HIGH (ref 70–99)
GLUCOSE BLDC GLUCOMTR-MCNC: 307 MG/DL — HIGH (ref 70–99)
GLUCOSE BLDC GLUCOMTR-MCNC: 353 MG/DL — HIGH (ref 70–99)
GLUCOSE BLDC GLUCOMTR-MCNC: 367 MG/DL — HIGH (ref 70–99)
GLUCOSE SERPL-MCNC: 270 MG/DL — HIGH (ref 70–99)
GLUCOSE SERPL-MCNC: 356 MG/DL — HIGH (ref 70–99)
GLUCOSE UR QL: >1000 MG/DL
HBA1C BLD-MCNC: 11.6 % — HIGH (ref 4–5.6)
HCT VFR BLD CALC: 43.7 % — SIGNIFICANT CHANGE UP (ref 39–50)
HDLC SERPL-MCNC: 28 MG/DL — LOW (ref 40–125)
HGB BLD-MCNC: 16.1 G/DL — SIGNIFICANT CHANGE UP (ref 13–17)
KETONES UR-MCNC: NEGATIVE — SIGNIFICANT CHANGE UP
LEUKOCYTE ESTERASE UR-ACNC: NEGATIVE — SIGNIFICANT CHANGE UP
LIPID PNL WITH DIRECT LDL SERPL: SIGNIFICANT CHANGE UP
MAGNESIUM SERPL-MCNC: 2.4 MG/DL — SIGNIFICANT CHANGE UP (ref 1.6–2.6)
MAGNESIUM SERPL-MCNC: 2.6 MG/DL — SIGNIFICANT CHANGE UP (ref 1.6–2.6)
MCHC RBC-ENTMCNC: 29.7 PG — SIGNIFICANT CHANGE UP (ref 27–34)
MCHC RBC-ENTMCNC: 36.9 GM/DL — HIGH (ref 32–36)
MCV RBC AUTO: 80.6 FL — SIGNIFICANT CHANGE UP (ref 80–100)
NITRITE UR-MCNC: NEGATIVE — SIGNIFICANT CHANGE UP
NT-PROBNP SERPL-SCNC: 571 PG/ML — HIGH (ref 0–300)
PH UR: 6.5 — SIGNIFICANT CHANGE UP (ref 5–8)
PHOSPHATE SERPL-MCNC: 2.3 MG/DL — LOW (ref 2.5–4.5)
PHOSPHATE SERPL-MCNC: 2.6 MG/DL — SIGNIFICANT CHANGE UP (ref 2.5–4.5)
PLATELET # BLD AUTO: 187 K/UL — SIGNIFICANT CHANGE UP (ref 150–400)
POTASSIUM SERPL-MCNC: 3.8 MMOL/L — SIGNIFICANT CHANGE UP (ref 3.5–5.3)
POTASSIUM SERPL-MCNC: 4.6 MMOL/L — SIGNIFICANT CHANGE UP (ref 3.5–5.3)
POTASSIUM SERPL-SCNC: 3.8 MMOL/L — SIGNIFICANT CHANGE UP (ref 3.5–5.3)
POTASSIUM SERPL-SCNC: 4.6 MMOL/L — SIGNIFICANT CHANGE UP (ref 3.5–5.3)
PROT SERPL-MCNC: 7.3 G/DL — SIGNIFICANT CHANGE UP (ref 6–8.3)
PROT SERPL-MCNC: 8.1 G/DL — SIGNIFICANT CHANGE UP (ref 6–8.3)
PROT UR-MCNC: 30 MG/DL
RBC # BLD: 5.42 M/UL — SIGNIFICANT CHANGE UP (ref 4.2–5.8)
RBC # FLD: 13.3 % — SIGNIFICANT CHANGE UP (ref 10.3–14.5)
RBC CASTS # UR COMP ASSIST: SIGNIFICANT CHANGE UP /HPF (ref 0–2)
SODIUM SERPL-SCNC: 135 MMOL/L — SIGNIFICANT CHANGE UP (ref 135–145)
SODIUM SERPL-SCNC: 137 MMOL/L — SIGNIFICANT CHANGE UP (ref 135–145)
SP GR SPEC: >1.03 — HIGH (ref 1.01–1.02)
TOTAL CHOLESTEROL/HDL RATIO MEASUREMENT: 6.7 RATIO — SIGNIFICANT CHANGE UP (ref 3.4–9.6)
TRIGL SERPL-MCNC: 630 MG/DL — HIGH (ref 10–149)
TROPONIN T SERPL-MCNC: 1.85 NG/ML — HIGH (ref 0–0.06)
TROPONIN T SERPL-MCNC: 2.82 NG/ML — HIGH (ref 0–0.06)
TSH SERPL-MCNC: 1.92 UIU/ML — SIGNIFICANT CHANGE UP (ref 0.27–4.2)
UROBILINOGEN FLD QL: NEGATIVE — SIGNIFICANT CHANGE UP
WBC # BLD: 9.8 K/UL — SIGNIFICANT CHANGE UP (ref 3.8–10.5)
WBC # FLD AUTO: 9.8 K/UL — SIGNIFICANT CHANGE UP (ref 3.8–10.5)
WBC UR QL: SIGNIFICANT CHANGE UP /HPF (ref 0–5)

## 2018-02-06 PROCEDURE — 99255 IP/OBS CONSLTJ NEW/EST HI 80: CPT | Mod: GC

## 2018-02-06 PROCEDURE — 93010 ELECTROCARDIOGRAM REPORT: CPT

## 2018-02-06 PROCEDURE — 99233 SBSQ HOSP IP/OBS HIGH 50: CPT | Mod: GC

## 2018-02-06 RX ORDER — INSULIN LISPRO 100/ML
5 VIAL (ML) SUBCUTANEOUS
Qty: 0 | Refills: 0 | Status: DISCONTINUED | OUTPATIENT
Start: 2018-02-06 | End: 2018-02-06

## 2018-02-06 RX ORDER — INSULIN LISPRO 100/ML
13 VIAL (ML) SUBCUTANEOUS
Qty: 0 | Refills: 0 | Status: DISCONTINUED | OUTPATIENT
Start: 2018-02-06 | End: 2018-02-07

## 2018-02-06 RX ORDER — SODIUM,POTASSIUM PHOSPHATES 278-250MG
1 POWDER IN PACKET (EA) ORAL
Qty: 0 | Refills: 0 | Status: DISCONTINUED | OUTPATIENT
Start: 2018-02-06 | End: 2018-02-06

## 2018-02-06 RX ORDER — TICAGRELOR 90 MG/1
90 TABLET ORAL
Qty: 0 | Refills: 0 | Status: DISCONTINUED | OUTPATIENT
Start: 2018-02-06 | End: 2018-02-08

## 2018-02-06 RX ORDER — POTASSIUM PHOSPHATE, MONOBASIC POTASSIUM PHOSPHATE, DIBASIC 236; 224 MG/ML; MG/ML
15 INJECTION, SOLUTION INTRAVENOUS ONCE
Qty: 0 | Refills: 0 | Status: COMPLETED | OUTPATIENT
Start: 2018-02-06 | End: 2018-02-06

## 2018-02-06 RX ORDER — POTASSIUM CHLORIDE 20 MEQ
20 PACKET (EA) ORAL ONCE
Qty: 0 | Refills: 0 | Status: DISCONTINUED | OUTPATIENT
Start: 2018-02-06 | End: 2018-02-06

## 2018-02-06 RX ORDER — INSULIN LISPRO 100/ML
VIAL (ML) SUBCUTANEOUS
Qty: 0 | Refills: 0 | Status: DISCONTINUED | OUTPATIENT
Start: 2018-02-06 | End: 2018-02-06

## 2018-02-06 RX ORDER — INSULIN LISPRO 100/ML
VIAL (ML) SUBCUTANEOUS
Qty: 0 | Refills: 0 | Status: DISCONTINUED | OUTPATIENT
Start: 2018-02-06 | End: 2018-02-08

## 2018-02-06 RX ORDER — METOPROLOL TARTRATE 50 MG
25 TABLET ORAL DAILY
Qty: 0 | Refills: 0 | Status: DISCONTINUED | OUTPATIENT
Start: 2018-02-06 | End: 2018-02-08

## 2018-02-06 RX ORDER — INSULIN LISPRO 100/ML
VIAL (ML) SUBCUTANEOUS AT BEDTIME
Qty: 0 | Refills: 0 | Status: DISCONTINUED | OUTPATIENT
Start: 2018-02-06 | End: 2018-02-08

## 2018-02-06 RX ORDER — LISINOPRIL 2.5 MG/1
5 TABLET ORAL DAILY
Qty: 0 | Refills: 0 | Status: DISCONTINUED | OUTPATIENT
Start: 2018-02-07 | End: 2018-02-08

## 2018-02-06 RX ORDER — TICAGRELOR 90 MG/1
180 TABLET ORAL ONCE
Qty: 0 | Refills: 0 | Status: COMPLETED | OUTPATIENT
Start: 2018-02-06 | End: 2018-02-06

## 2018-02-06 RX ORDER — INSULIN GLARGINE 100 [IU]/ML
40 INJECTION, SOLUTION SUBCUTANEOUS AT BEDTIME
Qty: 0 | Refills: 0 | Status: DISCONTINUED | OUTPATIENT
Start: 2018-02-06 | End: 2018-02-07

## 2018-02-06 RX ORDER — LISINOPRIL 2.5 MG/1
2.5 TABLET ORAL DAILY
Qty: 0 | Refills: 0 | Status: DISCONTINUED | OUTPATIENT
Start: 2018-02-06 | End: 2018-02-06

## 2018-02-06 RX ORDER — POTASSIUM CHLORIDE 20 MEQ
40 PACKET (EA) ORAL ONCE
Qty: 0 | Refills: 0 | Status: COMPLETED | OUTPATIENT
Start: 2018-02-06 | End: 2018-02-06

## 2018-02-06 RX ORDER — HEPARIN SODIUM 5000 [USP'U]/ML
5000 INJECTION INTRAVENOUS; SUBCUTANEOUS EVERY 8 HOURS
Qty: 0 | Refills: 0 | Status: DISCONTINUED | OUTPATIENT
Start: 2018-02-06 | End: 2018-02-08

## 2018-02-06 RX ADMIN — ATORVASTATIN CALCIUM 80 MILLIGRAM(S): 80 TABLET, FILM COATED ORAL at 21:28

## 2018-02-06 RX ADMIN — Medication 8: at 17:32

## 2018-02-06 RX ADMIN — TICAGRELOR 90 MILLIGRAM(S): 90 TABLET ORAL at 17:20

## 2018-02-06 RX ADMIN — HEPARIN SODIUM 5000 UNIT(S): 5000 INJECTION INTRAVENOUS; SUBCUTANEOUS at 21:28

## 2018-02-06 RX ADMIN — Medication 81 MILLIGRAM(S): at 12:55

## 2018-02-06 RX ADMIN — LISINOPRIL 2.5 MILLIGRAM(S): 2.5 TABLET ORAL at 06:01

## 2018-02-06 RX ADMIN — HEPARIN SODIUM 5000 UNIT(S): 5000 INJECTION INTRAVENOUS; SUBCUTANEOUS at 14:37

## 2018-02-06 RX ADMIN — Medication 5 UNIT(S): at 13:03

## 2018-02-06 RX ADMIN — TAMSULOSIN HYDROCHLORIDE 0.4 MILLIGRAM(S): 0.4 CAPSULE ORAL at 21:29

## 2018-02-06 RX ADMIN — HEPARIN SODIUM 5000 UNIT(S): 5000 INJECTION INTRAVENOUS; SUBCUTANEOUS at 06:01

## 2018-02-06 RX ADMIN — CARVEDILOL PHOSPHATE 6.25 MILLIGRAM(S): 80 CAPSULE, EXTENDED RELEASE ORAL at 06:01

## 2018-02-06 RX ADMIN — Medication 25 MILLIGRAM(S): at 12:55

## 2018-02-06 RX ADMIN — INSULIN GLARGINE 40 UNIT(S): 100 INJECTION, SOLUTION SUBCUTANEOUS at 22:04

## 2018-02-06 RX ADMIN — Medication 8: at 08:25

## 2018-02-06 RX ADMIN — INSULIN GLARGINE 25 UNIT(S): 100 INJECTION, SOLUTION SUBCUTANEOUS at 08:27

## 2018-02-06 RX ADMIN — Medication 5: at 13:02

## 2018-02-06 RX ADMIN — Medication 40 MILLIEQUIVALENT(S): at 08:26

## 2018-02-06 RX ADMIN — Medication 1 PATCH: at 11:04

## 2018-02-06 RX ADMIN — TICAGRELOR 180 MILLIGRAM(S): 90 TABLET ORAL at 11:04

## 2018-02-06 RX ADMIN — POTASSIUM PHOSPHATE, MONOBASIC POTASSIUM PHOSPHATE, DIBASIC 62.5 MILLIMOLE(S): 236; 224 INJECTION, SOLUTION INTRAVENOUS at 17:41

## 2018-02-06 RX ADMIN — Medication 13 UNIT(S): at 17:32

## 2018-02-06 NOTE — DISCHARGE NOTE ADULT - PROVIDER TOKENS
TOKEN:'[id number]' TOKEN:'[id number]:'[id number]' TOKEN:'[id number]:'[id number]:'[id number]'

## 2018-02-06 NOTE — DISCHARGE NOTE ADULT - PATIENT PORTAL LINK FT
You can access the [email or web address] Portal, offered by [facility], by registering with the following website: [email or web address]

## 2018-02-06 NOTE — CONSULT NOTE ADULT - PROBLEM SELECTOR RECOMMENDATION 9
s/p PCI and NERI X 1  C/w ASA, Brilinta, BB, ACEI, Statin  F/u TTE  telemonitoring  f/u cardio recs  monitor vitals and clinical status closely

## 2018-02-06 NOTE — DISCHARGE NOTE ADULT - MEDICATION SUMMARY - MEDICATIONS TO STOP TAKING
I will STOP taking the medications listed below when I get home from the hospital:    carvedilol    losartan  -- 100 milligram(s) by mouth once a day    Januvia  -- 100 milligram(s) by mouth once a day

## 2018-02-06 NOTE — DISCHARGE NOTE ADULT - CARE PROVIDERS DIRECT ADDRESSES
[email or web address]

## 2018-02-06 NOTE — DISCHARGE NOTE ADULT - MEDICATION SUMMARY - MEDICATIONS TO TAKE
I will START or STAY ON the medications listed below when I get home from the hospital:    ginny pen needles  -- injectable 4 times a day   -- Indication: For Diabetes    spironolactone 25 mg oral tablet  -- 1 tab(s) by mouth once a day  -- Indication: For Systolic dysfunction without heart failure    aspirin 81 mg oral tablet, chewable  -- 1 tab(s) by mouth once a day  -- Indication: For STEMI (ST elevation myocardial infarction)    lisinopril 5 mg oral tablet  -- 1 tab(s) by mouth once a day  -- Indication: For Systolic dysfunction without heart failure    tamsulosin  -- Indication: For BPH    HumaLOG KwikPen 100 units/mL injectable solution  -- 12 unit(s) injectable 3 times a day   -- Indication: For Diabetes    Lantus 100 units/mL subcutaneous solution  -- 40 unit(s) subcutaneous once a day (at bedtime)   -- Indication: For Diabetes    metFORMIN 1000 mg oral tablet  -- 1 tab(s) by mouth 2 times a day   -- Check with your doctor before becoming pregnant.  Do not drink alcoholic beverages when taking this medication.  It is very important that you take or use this exactly as directed.  Do not skip doses or discontinue unless directed by your doctor.  Obtain medical advice before taking any non-prescription drugs as some may affect the action of this medication.  Take with food or milk.    -- Indication: For Diabetes    atorvastatin 80 mg oral tablet  -- 1 tab(s) by mouth once a day (at bedtime)  -- Indication: For STEMI (ST elevation myocardial infarction)    ticagrelor 90 mg oral tablet  -- 1 tab(s) by mouth 2 times a day  -- Indication: For ST elevation myocardial infarction (STEMI)    metoprolol succinate 25 mg oral tablet, extended release  -- 1 tab(s) by mouth once a day  -- Indication: For STEMI (ST elevation myocardial infarction)    influenza virus vaccine, inactivated  -- Indication: For Prophylaxis    nicotine 14 mg/24 hr transdermal film, extended release  -- 14 milligram(s) by transdermal patch once a day   -- Indication: For Smoking cessation

## 2018-02-06 NOTE — CONSULT NOTE ADULT - ATTENDING COMMENTS
Pt seen and examined. Agree with note as above with addendum: spoke with pt and his wife via facetime. He is aware that his diet and poorly controlled DM/dyslipidemia will set him up for a recurrent MI. He is hesitant to make changes in his diet as he hates vegetables and loves junk food. His wife inquired about bringing a plain turkey burger. I encouraged only 1/2 bun. He also requested Chinese food, so I told his wife steamed chicken and broccoli with no sauce and brown rice would be his best option. Pt will f/u at our practice.

## 2018-02-06 NOTE — DISCHARGE NOTE ADULT - PLAN OF CARE
Coronary artery disease is a condition where the arteries the supply the heart muscle get clogges with fatty deposits & puts you at risk for a heart attack  Call your doctor if you have any new pain, pressure, or discomfort in the center of your chest, pain, tingling or discomfort in arms, back, neck, jaw, or stomach, shortness of breath, nausea, vomiting, burping or heartburn, sweating, cold and clammy skin, racing or abnormal heartbeat for more than 10 minutes or if they keep coming & going.  Call 911 and do not tr to get to hospital by care  You can help yourself with lefestyle changes (quitting smoking if you smoke), eat lots of fruits & vegetables & low fat dairy products, not a lot of meat & fatty foods, walk or some form of physical activity most days of the week, lose weight if you are overweight  Take your cardiac medication as prescribed to lower cholesterol, to lower blood pressure, aspirin to prevent blood clots, and diabetes control  Make sure to keep appointments with doctor for cardiac follow up care Angioplasty or coronary stenting are procedures to open up narrowed or blocked coronary arteries in the heart.  A stent is a tiny metal tube that helps to prop open an artery in the heart muscle.    Your doctor will instruct you when you can drive or resume usual physical activities  You MUST take aspirin & another agent Brilinta to help prevent clots inside the stent.  It is VERY important to take these medications as directed unless your cardiologist says it is OK to stop.  If another physican advises you to stop them, call cardiologist to discuss this advise since there is a risk of a heart attack or even death stopping these medications earlier than they should be.  Do NOT take more than 81 mg aspirin with Brilinta  The most common problems after coronary stenting is bleeding, bruising, & soreness at the tube insertion site - you can use tylenol for discomfort if not contraindicated  Call your doctor if you have chest pain, fever, pain, swelling, or redness where the tube went in You will not be short of breath. Take your medications as prescribed. Follow a  low-salt, low cholesterol heart healthy diet. Weigh yourself every day; call your doctor if you gain 2 pounds over one to two days or 3 pounds over three days. Get to or maintain a healthy weight; ask your heart failure team for referrals to a registered dietitian if needed. Be active (check with your physician or cardiologist first). Find healthy ways to deal with stress, such as deep breathing, meditation, exercise, and doing hobbies that you enjoy. If you smoke, quit. (A resource to help you stop smoking is the [facility] – phone number [phone].).   Do not NSAIDS like ibuprofen it is not good for the EF you have Your hemoglobin A1C will be between 7-8 Continue to follow with your primary care MD or your endocrinologist.  Follow a heart healthy diabetic diet. If you check your fingerstick glucose at home, call your MD if it is greater than 250mg/dL on 2 occasions or less than 100mg/dL on 2 occasions. Know signs of low blood sugar, such as: dizziness, shakiness, sweating, confusion, hunger, nervousness-drink 4 ounces apple juice if occurs and call your doctor. Know early signs of high blood sugar, such as: frequent urination, increased thirst, blurry vision, fatigue, headache - call your doctor if this occurs. Follow with other practitioners to care for your diabetes, such as ophthamologist and podiatrist.

## 2018-02-06 NOTE — DISCHARGE NOTE ADULT - FINDINGS/TREATMENT
Catheterization:< from: Cardiac Cath Lab - Adult (02.05.18 @ 16:05) >  EF estimated was 25 %.  CORONARY VESSELS: The coronary circulation is right dominant.  LM:   --  LM: Angiography showed minor luminal irregularities with no flow  limiting lesions.  LAD:   --  Ostial LAD: There was a 100 % stenosis.  CX:   --  Mid circumflex: There was a 30 % stenosis.  RI:   --  Ramus intermedius: There was a 100 % stenosis.  RCA:   --  RCA: Angiography showed minor luminal irregularities with no  flow limiting lesions.

## 2018-02-06 NOTE — CONSULT NOTE ADULT - PROBLEM SELECTOR PROBLEM 1
Uncontrolled type 2 diabetes mellitus with stage 2 chronic kidney disease, with long-term current use of insulin
STEMI (ST elevation myocardial infarction)

## 2018-02-06 NOTE — CONSULT NOTE ADULT - PROBLEM SELECTOR PROBLEM 2
Essential hypertension
Uncontrolled type 2 diabetes mellitus with stage 2 chronic kidney disease, with long-term current use of insulin

## 2018-02-06 NOTE — CHART NOTE - NSCHARTNOTEFT_GEN_A_CORE
====================  CCU MIDNIGHT ROUNDS  ====================    SEVERIANO MARTINEZ  [id number]    ====================  SUMMARY:  ====================    58 yo M smoker, CAD (MI, stents x 2), DM2 (insulin), HFrEF (37%, s/p [employer] Marc ICD), HTN, HLD, a/w anterior-septal STEMI s/p LHC 2/5 via RRA w/ NERI 100% ramus. % w/ collaterals.    ====================  NEW EVENTS:  ====================    No CP/palpitations/SOB. OOB and ambulating.    ====================  VITALS (Last 12 hrs):  ====================    T(C): 36.9 (02-06-18 @ 19:00), Max: 36.9 (02-06-18 @ 16:00)  HR: 96 (02-06-18 @ 19:00) (92 - 104)  BP: 115/67 (02-06-18 @ 19:00) (101/73 - 138/70)  BP(mean): 85 (02-06-18 @ 19:00) (81 - 104)  RR: 18 (02-06-18 @ 19:00) (18 - 29)  SpO2: 97% (02-06-18 @ 18:00) (96% - 98%)    TELEMETRY: sinus     I&O's Summary    05 Feb 2018 07:01  -  06 Feb 2018 07:00  --------------------------------------------------------  IN: 400 mL / OUT: 625 mL / NET: -225 mL    06 Feb 2018 07:01  -  06 Feb 2018 21:44  --------------------------------------------------------  IN: 850 mL / OUT: 650 mL / NET: 200 mL    ====================  PLAN:  ====================  - DAPT, lipitor, toprol, ACE, TTE @ 72h   - c/w insulin per endo, endo consulted appreciated   - smoking cessation and pt teaching re-inforced     Bridgette COOMBS/CCU  #[id number]

## 2018-02-06 NOTE — DISCHARGE NOTE ADULT - HOSPITAL COURSE
57 year old man Active Smoker with past medical history of MI/CAD with Stents, DMT2 requiring Insulin, HFrEF (37%) with ICD ([employer]), HTN, HLD presents to [facility] ED from MD office with intermittent, non-radiating midsternal chest pain X 1 day in early stages 5/10 relieved with belching on presentation to MD office started radiating to right upper arm with intensity increasing to 10/10.  In ED, ECG showed JORJE in V2-V3, received Aspirin, Plavix, Heparin Load and brought urgently to cath lab, LAD showed  of previous STENT and 100% RAMUS receiving NERI X 1 via Right Radial Artery.

## 2018-02-06 NOTE — DISCHARGE NOTE ADULT - ADDITIONAL INSTRUCTIONS
Follow-up with your cardiologist within 1 week  Make and appointment when you go home with Dr. Somers the endocrinologist

## 2018-02-06 NOTE — PROGRESS NOTE ADULT - PROBLEM SELECTOR PLAN 1
continue ASA, Plavix  continue high dose lipitor 80mg QHS continue ASA, Plavix  continue high dose lipitor 80mg QHS  start lopressor continue ASA, change to brilinta  continue high dose lipitor 80mg QHS  start lopressor continue ASA, change to Brilinta (reload) then BID  continue high dose lipitor 80mg QHS  change coreg and start Toprol XL  start ACE  trend CE's  TTE before discharge continue ASA, change to Brilinta (reload) then BID  continue high dose lipitor 80mg QHS  change coreg and start Toprol XL  start ACE  trend CE's  TTE before discharge   on med and diet compliance

## 2018-02-06 NOTE — DISCHARGE NOTE ADULT - CARE PROVIDER_API CALL
Conrad Looney), Cardiology; Internal Medicine  [address] [address]  Phone: [phone]  Fax: [phone] Conrad Looney), Cardiology; Internal Medicine  [address] [facility]  [address]  [place] [address]  Phone: [phone]  Fax: [phone]    Garrison Boykin), Internal Medicine  [address] [place] [address]  Phone: [phone]  Fax: [phone] Conrad Looney), Cardiology; Internal Medicine  [address] [facility] [address]  [place] [address]  Phone: [phone]  Fax: [phone]    Garrison Boykin), Internal Medicine  [address] [place] [address] [address]  [place] [address]  Phone: [phone]  Fax: [phone]    Jacque Somers), EndocrinologyMetabDiabetes; Internal Medicine  [address] [facility] [address]  [place] [address]  Phone: [phone]  Fax: [phone]

## 2018-02-06 NOTE — DISCHARGE NOTE ADULT - CARE PLAN
Principal Discharge DX:	STEMI involving oth coronary artery of anterior wall  Goal:	Coronary artery disease is a condition where the arteries the supply the heart muscle get clogges with fatty deposits & puts you at risk for a heart attack  Call your doctor if you have any new pain, pressure, or discomfort in the center of your chest, pain, tingling or discomfort in arms, back, neck, jaw, or stomach, shortness of breath, nausea, vomiting, burping or heartburn, sweating, cold and clammy skin, racing or abnormal heartbeat for more than 10 minutes or if they keep coming & going.  Call 911 and do not tr to get to hospital by care  You can help yourself with lefestyle changes (quitting smoking if you smoke), eat lots of fruits & vegetables & low fat dairy products, not a lot of meat & fatty foods, walk or some form of physical activity most days of the week, lose weight if you are overweight  Take your cardiac medication as prescribed to lower cholesterol, to lower blood pressure, aspirin to prevent blood clots, and diabetes control  Make sure to keep appointments with doctor for cardiac follow up care  Assessment and plan of treatment:	Angioplasty or coronary stenting are procedures to open up narrowed or blocked coronary arteries in the heart.  A stent is a tiny metal tube that helps to prop open an artery in the heart muscle.    Your doctor will instruct you when you can drive or resume usual physical activities  You MUST take aspirin & another agent Brilinta to help prevent clots inside the stent.  It is VERY important to take these medications as directed unless your cardiologist says it is OK to stop.  If another physican advises you to stop them, call cardiologist to discuss this advise since there is a risk of a heart attack or even death stopping these medications earlier than they should be.  Do NOT take more than 81 mg aspirin with Brilinta  The most common problems after coronary stenting is bleeding, bruising, & soreness at the tube insertion site - you can use tylenol for discomfort if not contraindicated  Call your doctor if you have chest pain, fever, pain, swelling, or redness where the tube went in  Secondary Diagnosis:	Systolic dysfunction without heart failure  Goal:	You will not be short of breath.  Assessment and plan of treatment:	Take your medications as prescribed. Follow a  low-salt, low cholesterol heart healthy diet. Weigh yourself every day; call your doctor if you gain 2 pounds over one to two days or 3 pounds over three days. Get to or maintain a healthy weight; ask your heart failure team for referrals to a registered dietitian if needed. Be active (check with your physician or cardiologist first). Find healthy ways to deal with stress, such as deep breathing, meditation, exercise, and doing hobbies that you enjoy. If you smoke, quit. (A resource to help you stop smoking is the [facility] – phone number [phone].).   Do not NSAIDS like ibuprofen it is not good for the EF you have  Secondary Diagnosis:	Uncontrolled type 2 diabetes mellitus with stage 2 chronic kidney disease, with long-term current use of insulin  Goal:	Your hemoglobin A1C will be between 7-8  Assessment and plan of treatment:	Continue to follow with your primary care MD or your endocrinologist.  Follow a heart healthy diabetic diet. If you check your fingerstick glucose at home, call your MD if it is greater than 250mg/dL on 2 occasions or less than 100mg/dL on 2 occasions. Know signs of low blood sugar, such as: dizziness, shakiness, sweating, confusion, hunger, nervousness-drink 4 ounces apple juice if occurs and call your doctor. Know early signs of high blood sugar, such as: frequent urination, increased thirst, blurry vision, fatigue, headache - call your doctor if this occurs. Follow with other practitioners to care for your diabetes, such as ophthamologist and podiatrist.

## 2018-02-06 NOTE — PROGRESS NOTE ADULT - PROBLEM SELECTOR PLAN 2
start pre meal coverage  continue RISS and HS Lantus start pre meal coverage  continue RISS and HS Lantus  endo consult start pre meal coverage  continue RISS and AM/HS Lantus  endo consult start pre meal coverage  continue RISS and AM/HS Lantus  endo consult  diabetic teaching

## 2018-02-06 NOTE — CONSULT NOTE ADULT - PROBLEM SELECTOR RECOMMENDATION 9
- while inpatient, recommend basal bolus insulin - Lantus 40 units qd with Humalog 13 units qac with moderate correction scale  - given that patient has received Lantus 25 units this AM, would administer additional 20 units of Lantus today, followed by NPH 20 units before breakfast on 2/7, then Lantus 40 units qhs starting on 2/7  - consistent carb diet  - nutrition consults  - will follow up HbA1c  - will follow  - can follow up in office as outpatient - [phone] - while inpatient, recommend basal bolus insulin - Lantus 40 units qd with Humalog 13 units qac with moderate correction scale  - consistent carb diet  - nutrition consults  - will follow up HbA1c  - will follow  - can follow up in office as outpatient - [phone]

## 2018-02-06 NOTE — PROGRESS NOTE ADULT - SUBJECTIVE AND OBJECTIVE BOX
Patient is a 57y old  Male who presents with a chief complaint of Chest Pain, AWSTEMI (05 Feb 2018 18:22)    HPI:  This is a 57 year old man Active Smoker with past medical history of MI/CAD with Stents, DMT2 requiring Insulin, HFrEF (37%) with ICD ([employer]), HTN, HLD presents to [facility] ED from MD office with intermittent, non-radiating midsternal chest pain X 1 day in early stages 5/10 relieved with belching on presentation to MD office started radiating to right upper arm with intensity increasing to 10/10.  In ED, ECG showed VTE in V2-V3, received Aspirin, Plavix, Heparin Load and brought urgently to cath lab receiving NERI X 1 to Ramus via Right Radial Artery.  Patient states that several family members have the flu.  He denies fever, chills, cough, SOB, abdominal pain, N/V/D. (05 Feb 2018 18:22)    Overnight Event:    REVIEW OF SYSTEMS:  	    MEDICATIONS  (STANDING):  aspirin  chewable 81 milliGRAM(s) Oral daily  atorvastatin 80 milliGRAM(s) Oral at bedtime  carvedilol 6.25 milliGRAM(s) Oral every 12 hours  clopidogrel Tablet 75 milliGRAM(s) Oral daily  dextrose 5%. 1000 milliLiter(s) (50 mL/Hr) IV Continuous <Continuous>  dextrose 50% Injectable 12.5 Gram(s) IV Push once  dextrose 50% Injectable 25 Gram(s) IV Push once  dextrose 50% Injectable 25 Gram(s) IV Push once  heparin  Injectable 5000 Unit(s) SubCutaneous every 8 hours  influenza   Vaccine 0.5 milliLiter(s) IntraMuscular once  insulin glargine Injectable (LANTUS) 25 Unit(s) SubCutaneous every morning  insulin glargine Injectable (LANTUS) 50 Unit(s) SubCutaneous at bedtime  insulin lispro (HumaLOG) corrective regimen sliding scale   SubCutaneous three times a day before meals  insulin lispro (HumaLOG) corrective regimen sliding scale   SubCutaneous at bedtime  lisinopril 2.5 milliGRAM(s) Oral daily  nicotine -  14 mG/24Hr(s) Patch 1 patch Transdermal daily  potassium chloride    Tablet ER 40 milliEquivalent(s) Oral once  tamsulosin 0.4 milliGRAM(s) Oral at bedtime    MEDICATIONS  (PRN):  dextrose Gel 1 Dose(s) Oral once PRN Blood Glucose LESS THAN 70 milliGRAM(s)/deciLiter  glucagon  Injectable 1 milliGRAM(s) IntraMuscular once PRN Glucose <70 milliGRAM(s)/deciLiter        PHYSICAL EXAM:  Vital Signs Last 24 Hrs  T(C): 36.8 (06 Feb 2018 06:00), Max: 36.9 (05 Feb 2018 17:55)  T(F): 98.3 (06 Feb 2018 06:00), Max: 98.5 (05 Feb 2018 17:55)  HR: 92 (06 Feb 2018 06:00) (86 - 104)  BP: 116/83 (06 Feb 2018 06:00) (107/69 - 153/60)  BP(mean): 93 (06 Feb 2018 06:00) (79 - 106)  RR: 16 (06 Feb 2018 06:00) (16 - 53)  SpO2: 96% (06 Feb 2018 06:00) (94% - 98%)  I&O's Summary    05 Feb 2018 07:01  -  06 Feb 2018 07:00  --------------------------------------------------------  IN: 400 mL / OUT: 625 mL / NET: -225 mL        Appearance: Normal	  HEENT:   Normal oral mucosa, PERRL, EOMI	  Lymphatic: No lymphadenopathy  Cardiovascular: Normal S1 S2, No JVD, No murmurs, No edema  Respiratory: Lungs clear to auscultation	  Psychiatry: A & O x 3, Mood & affect appropriate  Gastrointestinal:  Soft, Non-tender, + BS	  Skin: No rashes, No ecchymoses, No cyanosis	  Neurologic: Non-focal  Extremities: Normal range of motion, No clubbing, cyanosis or edema  Vascular: Peripheral pulses palpable 2+ bilaterally    LABS:	 	                        16.1   9.8   )-----------( 187      ( 06 Feb 2018 03:15 )             43.7     Auto Eosinophil # x     / Auto Eosinophil % x     / Auto Neutrophil # x     / Auto Neutrophil % x     / BANDS % x                            16.1   10.7  )-----------( 205      ( 05 Feb 2018 16:10 )             45.0     Auto Eosinophil # 0.1   / Auto Eosinophil % 1.4   / Auto Neutrophil # 6.6   / Auto Neutrophil % 62.0  / BANDS % x        INR: 1.14 ratio (02-05 @ 16:10)    02-06    137  |  100  |  19  ----------------------------<  270<H>  3.8   |  22  |  1.23  02-05    134<L>  |  98  |  20  ----------------------------<  311<H>  4.2   |  23  |  1.36<H>    Ca    9.4      06 Feb 2018 03:16  Mg     2.4     02-06  Phos  2.6     02-06  TPro  7.3  /  Alb  3.9  /  TBili  0.3  /  DBili  x   /  AST  115<H>  /  ALT  42  /  AlkPhos  80  02-06  TPro  7.9  /  Alb  4.4  /  TBili  0.3  /  DBili  x   /  AST  16  /  ALT  34  /  AlkPhos  88  02-05        proBNP: Serum Pro-Brain Natriuretic Peptide: 571 pg/mL (02-06 @ 03:16)  Serum Pro-Brain Natriuretic Peptide: 300 pg/mL (02-05 @ 16:10)    Lipid Profile: 02-06 Chol 188 LDL Unable to calculate LDL Cholesterol --- Interpretive Comment (for adults 18 and over)  Optimal LDL Level may vary based on clinical situation  Below 70                  Ideal for people at very high risk of heart  disease  Below 100                Ideal for people at risk of heart disease  100 - 129                   Near [place]  130 - 159                   Borderline high  160 - 189                   High  190 and Above          Very high HDL 28<L> Trig 630<H>  HgA1c:   TSH:     CARDIAC MARKERS:   06 Feb 2018 03:16 Troponin 2.82 ng/mL / Creatine Kinase 1128 U/L /  CKMB 61.7 ng/mL / CPK Mass Assay % 5.5 %   05 Feb 2018 16:10 Troponin <0.01 ng/mL / Creatine Kinase 323 U/L /  CKMB 8.5 ng/mL / CPK Mass Assay % 2.6 %        TELEMETRY: 	    ECG:  	  RADIOLOGY:  OTHER: 	    PREVIOUS DIAGNOSTIC TESTING:    [ ] Echocardiogram:  [ ]  Catheterization: 	  	< from: Cardiac Cath Lab - Adult (02.05.18 @ 16:05) >  CORONARY VESSELS: The coronary circulation is right dominant.  LM:   --  LM: Angiography showed minor luminal irregularities with no flow  limiting lesions.  LAD:   --  Ostial LAD: There was a 100 % stenosis.  CX:   --  Mid circumflex: There was a 30 % stenosis.  RI:   --  Ramus intermedius: There was a 100 % stenosis.  RCA:   --  RCA: Angiography showed minor luminal irregularities with no  flow limiting lesions.    < end of copied text >    [facility]  Contact #[phone] Patient is a 57y old  Male who presents with a chief complaint of Chest Pain, AWSTEMI (05 Feb 2018 18:22)    HPI:  This is a 57 year old man Active Smoker with past medical history of MI/CAD with Stents, DMT2 requiring Insulin, HFrEF (37%) with ICD ([employer]. Marc), HTN, HLD presents to [facility] ED from MD office with intermittent, non-radiating midsternal chest pain X 1 day in early stages 5/10 relieved with belching on presentation to MD office started radiating to right upper arm with intensity increasing to 10/10.  In ED, ECG showed VTE in V2-V3, received Aspirin, Plavix, Heparin Load and brought urgently to cath lab receiving NERI X 1 to Ramus via Right Radial Artery.  Patient states that several family members have the flu.  He denies fever, chills, cough, SOB, abdominal pain, N/V/D. (05 Feb 2018 18:22)    Overnight Event: Had chest pain midsternal 3/10 no ekg changes resolved and did't return    REVIEW OF SYSTEMS: No chest pain or SOB  	    MEDICATIONS  (STANDING):  aspirin  chewable 81 milliGRAM(s) Oral daily  atorvastatin 80 milliGRAM(s) Oral at bedtime  carvedilol 6.25 milliGRAM(s) Oral every 12 hours  clopidogrel Tablet 75 milliGRAM(s) Oral daily  dextrose 5%. 1000 milliLiter(s) (50 mL/Hr) IV Continuous <Continuous>  dextrose 50% Injectable 12.5 Gram(s) IV Push once  dextrose 50% Injectable 25 Gram(s) IV Push once  dextrose 50% Injectable 25 Gram(s) IV Push once  heparin  Injectable 5000 Unit(s) SubCutaneous every 8 hours  influenza   Vaccine 0.5 milliLiter(s) IntraMuscular once  insulin glargine Injectable (LANTUS) 25 Unit(s) SubCutaneous every morning  insulin glargine Injectable (LANTUS) 50 Unit(s) SubCutaneous at bedtime  insulin lispro (HumaLOG) corrective regimen sliding scale   SubCutaneous three times a day before meals  insulin lispro (HumaLOG) corrective regimen sliding scale   SubCutaneous at bedtime  lisinopril 2.5 milliGRAM(s) Oral daily  nicotine -  14 mG/24Hr(s) Patch 1 patch Transdermal daily  potassium chloride    Tablet ER 40 milliEquivalent(s) Oral once  tamsulosin 0.4 milliGRAM(s) Oral at bedtime    MEDICATIONS  (PRN):  dextrose Gel 1 Dose(s) Oral once PRN Blood Glucose LESS THAN 70 milliGRAM(s)/deciLiter  glucagon  Injectable 1 milliGRAM(s) IntraMuscular once PRN Glucose <70 milliGRAM(s)/deciLiter        PHYSICAL EXAM:  Vital Signs Last 24 Hrs  T(C): 36.8 (06 Feb 2018 06:00), Max: 36.9 (05 Feb 2018 17:55)  T(F): 98.3 (06 Feb 2018 06:00), Max: 98.5 (05 Feb 2018 17:55)  HR: 92 (06 Feb 2018 06:00) (86 - 104)  BP: 116/83 (06 Feb 2018 06:00) (107/69 - 153/60)  BP(mean): 93 (06 Feb 2018 06:00) (79 - 106)  RR: 16 (06 Feb 2018 06:00) (16 - 53)  SpO2: 96% (06 Feb 2018 06:00) (94% - 98%)  I&O's Summary    05 Feb 2018 07:01  -  06 Feb 2018 07:00  --------------------------------------------------------  IN: 400 mL / OUT: 625 mL / NET: -225 mL        Appearance: NAD, sitting at bedside  HEENT:   Normal oral mucosa, PERRL  Cardiovascular: Normal S1 S2, No JVD, No murmurs, No edema  Respiratory: Diminished, poor inspiratory effort  Psychiatry: A & O x 3, Mood & affect appropriate  Gastrointestinal:  Soft, Non-tender, + BS	  Skin: No rashes, No ecchymoses, No cyanosis, skin warm and dry  Neurologic: Non-focal  Extremities: Normal range of motion, No clubbing, cyanosis or edema  Vascular: Peripheral pulses palpable 2+ bilaterally  Right wrist access site with good pulses, no hematoma or ecchymosis  LABS:	 	                        16.1   9.8   )-----------( 187      ( 06 Feb 2018 03:15 )             43.7     Auto Eosinophil # x     / Auto Eosinophil % x     / Auto Neutrophil # x     / Auto Neutrophil % x     / BANDS % x                            16.1   10.7  )-----------( 205      ( 05 Feb 2018 16:10 )             45.0     Auto Eosinophil # 0.1   / Auto Eosinophil % 1.4   / Auto Neutrophil # 6.6   / Auto Neutrophil % 62.0  / BANDS % x        INR: 1.14 ratio (02-05 @ 16:10)    02-06    137  |  100  |  19  ----------------------------<  270<H>  3.8   |  22  |  1.23  02-05    134<L>  |  98  |  20  ----------------------------<  311<H>  4.2   |  23  |  1.36<H>    Ca    9.4      06 Feb 2018 03:16  Mg     2.4     02-06  Phos  2.6     02-06  TPro  7.3  /  Alb  3.9  /  TBili  0.3  /  DBili  x   /  AST  115<H>  /  ALT  42  /  AlkPhos  80  02-06  TPro  7.9  /  Alb  4.4  /  TBili  0.3  /  DBili  x   /  AST  16  /  ALT  34  /  AlkPhos  88  02-05    proBNP: Serum Pro-Brain Natriuretic Peptide: 571 pg/mL (02-06 @ 03:16)  Serum Pro-Brain Natriuretic Peptide: 300 pg/mL (02-05 @ 16:10)    Lipid Profile: 02-06 Chol 188 LDL Unable to calculate LDL Cholesterol --- Interpretive Comment (for adults 18 and over)  Optimal LDL Level may vary based on clinical situation  Below 70                  Ideal for people at very high risk of heart  disease  Below 100                Ideal for people at risk of heart disease  100 - 129                   Near [place]  130 - 159                   Borderline high  160 - 189                   High  190 and Above          Very high HDL 28<L> Trig 630<H>  HgA1c:   TSH:     CARDIAC MARKERS:   06 Feb 2018 03:16 Troponin 2.82 ng/mL / Creatine Kinase 1128 U/L /  CKMB 61.7 ng/mL / CPK Mass Assay % 5.5 %   05 Feb 2018 16:10 Troponin <0.01 ng/mL / Creatine Kinase 323 U/L /  CKMB 8.5 ng/mL / CPK Mass Assay % 2.6 %        TELEMETRY: No ectopy overnight   ECG:  	resolving JORJE Raza/septal	    PREVIOUS DIAGNOSTIC TESTING:    [ ] Echocardiogram:  [ ]  Catheterization: 	  	< from: Cardiac Cath Lab - Adult (02.05.18 @ 16:05) >  CORONARY VESSELS: The coronary circulation is right dominant.  LM:   --  LM: Angiography showed minor luminal irregularities with no flow  limiting lesions.  LAD:   --  Ostial LAD: There was a 100 % stenosis.  CX:   --  Mid circumflex: There was a 30 % stenosis.  RI:   --  Ramus intermedius: There was a 100 % stenosis.  RCA:   --  RCA: Angiography showed minor luminal irregularities with no  flow limiting lesions.    < end of copied text >    [facility] CenterJO[facility]  Contact #[phone]

## 2018-02-06 NOTE — CONSULT NOTE ADULT - SUBJECTIVE AND OBJECTIVE BOX
HPI:  This is a 57 year old man Active Smoker with past medical history of MI/CAD with Stents, DMT2 requiring Insulin, HFrEF (37%) with ICD ([employer]), HTN, HLD presents to [facility] ED from MD office with chest pain 2/2 STEMI.    Endocrine History: Patient was diagnosed with DM2 at least 10 years ago. On metformin 1000 mg BID, Januvia 100 mg qd, and Lantus 25 units in AM and Lantus 50 units qhs. Does not use pre-meal insulin. Checks FS at most once a day in the AM - in the last month, they have been in the high 200's. As per wife and patient, patient eats a lot of fast food, cookies, brownies, and drinks a lot of soda. Does not have an endocrinologist, only has a PMD. Saw optho two months ago, no retinopathy. Denies neuropathy. Patient has chronic kidney disease, stage 2.     Patient and wife are hesistant to start bolus insulin at home. They report that his last HbA1c was close to 11.     PAST MEDICAL & SURGICAL HISTORY:  AICD (automatic cardioverter/defibrillator) present  Diabetes mellitus type 2  Stented coronary artery  s/p appendectomy      FAMILY HISTORY:  DM2 in mother and sister      Social History:  No alcohol use  Smokes one pack of cigarettes a week for at least 30 years  Lives with wife    Outpatient Medications:  · 	Lantus 25 units in AM and 50 units in PM  · 	atorvastatin: 40 mg  · 	carvedilol: 6.25 mg BID  · 	losartan: 100 mg qd  · 	Januvia: 100 mg qd  · 	tamsulosin:   · 	losartan: 100 milligram(s) orally once a day  · 	Januvia: 100 milligram(s) orally once a day  · 	metFORMIN: 1000 milligram(s) orally 2 times a day    MEDICATIONS  (STANDING):  aspirin  chewable 81 milliGRAM(s) Oral daily  atorvastatin 80 milliGRAM(s) Oral at bedtime  dextrose 5%. 1000 milliLiter(s) (50 mL/Hr) IV Continuous <Continuous>  dextrose 50% Injectable 12.5 Gram(s) IV Push once  dextrose 50% Injectable 25 Gram(s) IV Push once  dextrose 50% Injectable 25 Gram(s) IV Push once  heparin  Injectable 5000 Unit(s) SubCutaneous every 8 hours  influenza   Vaccine 0.5 milliLiter(s) IntraMuscular once  insulin glargine Injectable (LANTUS) 25 Unit(s) SubCutaneous every morning  insulin glargine Injectable (LANTUS) 50 Unit(s) SubCutaneous at bedtime  insulin lispro (HumaLOG) corrective regimen sliding scale   SubCutaneous three times a day before meals  insulin lispro (HumaLOG) corrective regimen sliding scale   SubCutaneous at bedtime  insulin lispro Injectable (HumaLOG) 5 Unit(s) SubCutaneous before breakfast  insulin lispro Injectable (HumaLOG) 5 Unit(s) SubCutaneous before lunch  insulin lispro Injectable (HumaLOG) 5 Unit(s) SubCutaneous before dinner  metoprolol succinate ER 25 milliGRAM(s) Oral daily  nicotine -  14 mG/24Hr(s) Patch 1 patch Transdermal daily  potassium acid phosphate/sodium acid phosphate tablet (K-PHOS No. 2) 1 Tablet(s) Oral four times a day with meals  tamsulosin 0.4 milliGRAM(s) Oral at bedtime  ticagrelor 90 milliGRAM(s) Oral two times a day    MEDICATIONS  (PRN):  dextrose Gel 1 Dose(s) Oral once PRN Blood Glucose LESS THAN 70 milliGRAM(s)/deciLiter  glucagon  Injectable 1 milliGRAM(s) IntraMuscular once PRN Glucose <70 milliGRAM(s)/deciLiter      Allergies  No Known Allergies          Review of Systems:  Constitutional: No fever  Eyes: No blurry vision  Neuro: No tremors  HEENT: No pain  Cardiovascular: No chest pain, palpitations  Respiratory: No SOB, no cough  GI: No nausea, vomiting, abdominal pain  : No dysuria  Skin: no rash  Endocrine: no polyuria, polydipsia      ALL OTHER SYSTEMS REVIEWED AND NEGATIVE      PHYSICAL EXAM:  VITALS: T(C): 36.7 (02-06-18 @ 08:00)  T(F): 98 (02-06-18 @ 08:00), Max: 98.5 (02-05-18 @ 17:55)  HR: 94 (02-06-18 @ 11:00) (86 - 104)  BP: 132/85 (02-06-18 @ 11:00) (106/73 - 153/60)  RR:  (16 - 53)  SpO2:  (93% - 98%)  Wt(kg): --  GENERAL: NAD, well-groomed, well-developed  EYES: No proptosis, anicteric  HEENT:  Atraumatic, Normocephalic  RESPIRATORY: Clear to auscultation bilaterally; No rales, rhonchi, wheezing  CARDIOVASCULAR: Regular rate and rhythm; No murmurs; no peripheral edema  GI: Soft, nontender, non distended, normal bowel sounds  SKIN: Dry, intact, No rashes or lesions  MUSCULOSKELETAL: Full range of motion, normal strength  PSYCH: Alert and oriented x 3, reactive affect        POCT Blood Glucose.: 367 mg/dL (02-06-18 @ 13:01)  POCT Blood Glucose.: 353 mg/dL (02-06-18 @ 11:54) H5, 5  POCT Blood Glucose.: 303 mg/dL (02-06-18 @ 07:58) L 25, H 8  POCT Blood Glucose.: 283 mg/dL (02-05-18 @ 21:50) L50, 2  POCT Blood Glucose.: 284 mg/dL (02-05-18 @ 17:08)                          16.1   9.8   )-----------( 187      ( 06 Feb 2018 03:15 )             43.7       02-06    135  |  97  |  17  ----------------------------<  356<H>  4.6   |  25  |  1.30    EGFR if : 71  EGFR if non : 61    Ca    9.4      02-06  Mg     2.6     02-06  Phos  2.3     02-06    TPro  8.1  /  Alb  4.4  /  TBili  0.5  /  DBili  x   /  AST  90<H>  /  ALT  45  /  AlkPhos  93  02-06      Thyroid Function Tests:  02-06 @ 04:53 TSH 1.92           02-06 Chol 188 LDL Unable to calculate LDL Cholesterol --- Interpretive Comment (for adults 18 and over)  Optimal LDL Level may vary based on clinical situation  Below 70                  Ideal for people at very high risk of heart  disease  Below 100                Ideal for people at risk of heart disease  100 - 129                   [place]  130 - 159                   Borderline high  160 - 189                   High  190 and Above          Very high HDL 28<L> Trig 630<H>
Patient seen and examined at bedside with pt's wife at bedside  In addition, case discussed in detail with outpatient PMD    Patient is a 56 yo Male who presents with a chief complaint of Chest Pain, AWSTEMI (2018 18:22)      HPI:  This is a 57 year old man Active Smoker with past medical history of MI/CAD with Stents, DMT2 requiring Insulin, HFrEF (37%) with ICD ([employer]), HTN, HLD presents to [facility] ED from MD office with intermittent, non-radiating midsternal chest pain X 1 day, admitted for AWSTEMI, s/p cardiac cath and drug eluting stent X 1 placement      PAST MEDICAL & SURGICAL HISTORY:  AICD (automatic cardioverter/defibrillator) present  Diabetes  Stented coronary artery  No significant past surgical history      MEDICATIONS  (STANDING):  aspirin  chewable 81 milliGRAM(s) Oral daily  atorvastatin 80 milliGRAM(s) Oral at bedtime  dextrose 5%. 1000 milliLiter(s) (50 mL/Hr) IV Continuous <Continuous>  dextrose 50% Injectable 12.5 Gram(s) IV Push once  dextrose 50% Injectable 25 Gram(s) IV Push once  dextrose 50% Injectable 25 Gram(s) IV Push once  heparin  Injectable 5000 Unit(s) SubCutaneous every 8 hours  influenza   Vaccine 0.5 milliLiter(s) IntraMuscular once  insulin glargine Injectable (LANTUS) 25 Unit(s) SubCutaneous every morning  insulin glargine Injectable (LANTUS) 50 Unit(s) SubCutaneous at bedtime  insulin lispro (HumaLOG) corrective regimen sliding scale   SubCutaneous three times a day before meals  insulin lispro (HumaLOG) corrective regimen sliding scale   SubCutaneous at bedtime  insulin lispro Injectable (HumaLOG) 5 Unit(s) SubCutaneous before breakfast  insulin lispro Injectable (HumaLOG) 5 Unit(s) SubCutaneous before lunch  insulin lispro Injectable (HumaLOG) 5 Unit(s) SubCutaneous before dinner  metoprolol succinate ER 25 milliGRAM(s) Oral daily  nicotine -  14 mG/24Hr(s) Patch 1 patch Transdermal daily  potassium acid phosphate/sodium acid phosphate tablet (K-PHOS No. 2) 1 Tablet(s) Oral four times a day with meals  tamsulosin 0.4 milliGRAM(s) Oral at bedtime  ticagrelor 90 milliGRAM(s) Oral two times a day    MEDICATIONS  (PRN):  dextrose Gel 1 Dose(s) Oral once PRN Blood Glucose LESS THAN 70 milliGRAM(s)/deciLiter  glucagon  Injectable 1 milliGRAM(s) IntraMuscular once PRN Glucose <70 milliGRAM(s)/deciLiter      FAMILY HISTORY: No pertinent family history in first degree relatives    SOCIAL HISTORY: Denies    REVIEW OF SYSTEMS:  CONSTITUTIONAL: (+) Malaise  EYES: No acute change in vision.  ENT:  No difficulty hearing, tinnitus, vertigo  NECK: No pain or stiffness  RESPIRATORY: No cough, wheezing, hemoptysis, or shortness of breath  CARDIOVASCULAR: No more active chest pain, palpitations, syncope, or leg swelling  GASTROINTESTINAL: No abdominal pain, N/V, hematemesis, diarrhea, melena, or hematochezia.  GENITOURINARY: No dysuria, frequency, hematuria  NEUROLOGICAL: No headaches, acute loss of strength, numbness, or tremors  SKIN: No rashes or lesions   LYMPH NODES: No enlarged glands  ENDOCRINE: No heat or cold intolerance  MUSCULOSKELETAL: No arthralgia, myalgia   PSYCHIATRIC: No suicidal or homicidal ideations  HEME/LYMPH: No easy bruising or bleeding   ALLERGY AND IMMUNOLOGIC: No hives or eczema	    Vital Signs Last 24 Hrs  T(C): 36.7 (2018 08:00), Max: 36.9 (2018 17:55)  T(F): 98 (2018 08:00), Max: 98.5 (2018 17:55)  HR: 92 (2018 15:00) (86 - 104)  BP: 101/73 (2018 15:00) (101/73 - 153/60)  BP(mean): 81 (2018 15:00) (79 - 106)  RR: 19 (2018 15:00) (16 - 53)  SpO2: 98% (2018 15:00) (93% - 98%)    Constitutional: WD, WN, NAD  HEENT: NC, AT  Neck:  Supple. No JVD, bruits or thyromegaly  Respiratory:  CTA b/l. No wheezing, rhonchi, or rales. Not using accessory muscles of respiration.   Cardiovascular:  RRR. systolic murmur. No R/G. 2+ radial pulses b/l.   Gastrointestinal: Soft, NT, ND, normoactive bowel sounds.  No organomegaly, rigidity, or RT.  Extremities: WWP without cyanosis, clubbing or edema.  Neurological:  Alert and awake.  Crude sensation intact.  No acute motor deficits.       LABS:                        16.1   9.8   )-----------( 187      ( 2018 03:15 )             43.7     02-06    135  |  97  |  17  ----------------------------<  356<H>  4.6   |  25  |  1.30    Ca    9.4      2018 11:06  Phos  2.3     02-06  Mg     2.6     02-06    TPro  8.1  /  Alb  4.4  /  TBili  0.5  /  DBili  x   /  AST  90<H>  /  ALT  45  /  AlkPhos  93  02-06    CARDIAC MARKERS ( 2018 11:06 )  x     / 1.85 ng/mL / 890 U/L / x     / 37.6 ng/mL  CARDIAC MARKERS ( 2018 03:16 )  x     / 2.82 ng/mL / 1128 U/L / x     / 61.7 ng/mL  CARDIAC MARKERS ( 2018 16:10 )  x     / <0.01 ng/mL / 323 U/L / x     / 8.5 ng/mL      PT/INR - ( 2018 16:10 )   PT: 12.3 sec;   INR: 1.14 ratio         PTT - ( 2018 16:10 )  PTT:31.1 sec  Urinalysis Basic - ( 2018 04:26 )    Color: Yellow / Appearance: Clear / SG: >1.030 / pH: x  Gluc: x / Ketone: Negative  / Bili: Negative / Urobili: Negative   Blood: x / Protein: 30 mg/dL / Nitrite: Negative   Leuk Esterase: Negative / RBC: 0-2 /HPF / WBC 0-2 /HPF   Sq Epi: x / Non Sq Epi: x / Bacteria: x      CAPILLARY BLOOD GLUCOSE      POCT Blood Glucose.: 367 mg/dL (2018 13:01)  POCT Blood Glucose.: 353 mg/dL (2018 11:54)  POCT Blood Glucose.: 303 mg/dL (2018 07:58)  POCT Blood Glucose.: 283 mg/dL (2018 21:50)  POCT Blood Glucose.: 284 mg/dL (2018 17:08)        RADIOLOGY & ADDITIONAL STUDIES:    < from: Cardiac Cath Lab - Adult (18 @ 16:05) >  [facility]  Department of Cardiology  [address] [place] [phone]  Cath Lab Report -- Comprehensive Report  Patient: SEVERIANO MARTINEZ  Study date: 2018  Account number: [id number]  MR number: [id number]  : 1960  Gender: Male  Race: W  Case Physician(s):  REAGAN Calhoun M.D.  Fellow:  Bandar Mack M.D.  Referring Physician:  Conrad Looney M.D.  INDICATIONS: Initial STEMI.  HISTORY: The patient has a historyof coronary artery disease. The patient  has hypertension and medication-treated dyslipidemia.  PROCEDURE:  --  Left heart catheterization with ventriculography.  --  Left coronary angiography.  --  Right coronary angiography.  --  Intervention on ramus intermedius: drug-eluting stent.  TECHNIQUE: The risks and alternatives of the procedures and conscious  sedation were explained to the patient and informed consent was obtained.  Cardiac catheterization performed urgently. Coronary intervention  performed urgently.  Local anesthetic given. Right radial artery access. Left heart  catheterization. Ventriculography was performed. Left coronary artery  angiography. The vessel was injected utilizing a catheter. Right coronary  artery angiography. The vessel was injected utilizing a catheter.  RADIATION EXPOSURE: 5.5 min. A successful drug-eluting stent was performed  on the 100 % lesion in the ramus intermedius. Following intervention there  was a 1 % residual stenosis. According to the ACC/AHA classification  system, this lesion was a type C lesion. There was MICHELLE 0 flow before the  procedure and MICHELLE 3 flow after the procedure. Vessel setup was performed.  A 6FR JL3.5 LAUNCHER guiding catheter was used to intubate the vessel.  Vessel setup was performed. A Anaergia UNIVERSAL 190CM wire was used to cross  the lesion. Balloon angioplasty was performed, using a 2.5 X 20 EUPHORA  balloon, with 1 inflations and a maximum inflation pressure of 18 maggi. A  3.00 X 22 JAIDA drug-eluting stent was placed across the lesion and  deployed at a maximum inflation pressure of 14 maggi.  CONTRAST GIVEN: Omnipaque 101 ml. An IABP was not used.  MEDICATIONS GIVEN: Fentanyl, 25 mcg, IV. Midazolam, 1 mg, IV. Verapamil  (Isoptin, Calan, Covera), 2.5 mg, IA. Heparin, 3000 units, IA. Heparin,  2000 units, IV.  VENTRICLES: Analysis of regional contractile function demonstrated severe  anterolateral hypokinesis, apical dyskinesis, and diaphragmatic  dyskinesis. EF estimated was 25 %.  CORONARY VESSELS: The coronary circulation is right dominant.  LM:   --  LM: Angiography showed minor luminal irregularities with no flow  limiting lesions.  LAD:   --  Ostial LAD: There was a 100 % stenosis.  CX:   --  Mid circumflex: There was a 30 % stenosis.  RI:   --  Ramus intermedius: There was a 100 % stenosis.  RCA:   --  RCA: Angiography showed minor luminal irregularities with no  flow limiting lesions.  COMPLICATIONS: There were no complications. No complications occurred  during the cath lab visit.  DIAGNOSTIC RECOMMENDATIONS: ASA and Plavix for 1 year.  INTERVENTIONAL RECOMMENDATIONS: ASA and Plavix for 1 year.  Prepared and signed by  Stefan Mireles M.D.  Signed 2018 17:53:51  HEMODYNAMIC TABLES  Pressures:  Baseline  Pressures:  - HR: 94  Pressures:  - Rhythm:  Pressures:  -- Aortic Pressure (S/D/M): 116/74/73  Pressures:  -- Left Ventricle (s/edp): 108/23/--  Outputs:  Baseline  Outputs:  -- CALCULATIONS[age]  Outputs:  -- CALCULATIONS: Body Surface Area: 2.46  Outputs:  -- CALCULATIONS: Height in cm: 196.00  Outputs:  -- CALCULATIONS: Sex: Male  Outputs:  -- CALCULATIONS: Weight in k.40    < end of copied text >

## 2018-02-06 NOTE — CONSULT NOTE ADULT - ASSESSMENT
58 y/o M PMH HTN, HLD, uncontrolled DM2 on insulin, CAD, HFrEF, presents with STEMI, also with hyperglycemia in setting of uncontrolled DM2
This is a 57 year old man Active Smoker with past medical history of MI/CAD with Stents, DMT2 requiring Insulin, HFrEF (37%) with ICD ([employer]), HTN, HLD presents to [facility] ED from MD office with intermittent, non-radiating midsternal chest pain X 1 day, admitted for STEMI, s/p PCI with NERI X 1 placement

## 2018-02-06 NOTE — DISCHARGE NOTE ADULT - SECONDARY DIAGNOSIS.
Systolic dysfunction without heart failure Uncontrolled type 2 diabetes mellitus with stage 2 chronic kidney disease, with long-term current use of insulin

## 2018-02-06 NOTE — CONSULT NOTE ADULT - PROBLEM SELECTOR RECOMMENDATION 2
- BP generally at goal (<130/80)  - c/w current management
c/w lantus and ihss  monitor acks  DM education

## 2018-02-07 DIAGNOSIS — I51.9 HEART DISEASE, UNSPECIFIED: ICD-10-CM

## 2018-02-07 DIAGNOSIS — Z91.19 PATIENT'S NONCOMPLIANCE WITH OTHER MEDICAL TREATMENT AND REGIMEN: ICD-10-CM

## 2018-02-07 LAB
ALBUMIN SERPL ELPH-MCNC: 3.6 G/DL — SIGNIFICANT CHANGE UP (ref 3.3–5)
ALP SERPL-CCNC: 76 U/L — SIGNIFICANT CHANGE UP (ref 40–120)
ALT FLD-CCNC: 34 U/L RC — SIGNIFICANT CHANGE UP (ref 10–45)
ANION GAP SERPL CALC-SCNC: 14 MMOL/L — SIGNIFICANT CHANGE UP (ref 5–17)
AST SERPL-CCNC: 43 U/L — HIGH (ref 10–40)
BILIRUB SERPL-MCNC: 0.3 MG/DL — SIGNIFICANT CHANGE UP (ref 0.2–1.2)
BUN SERPL-MCNC: 23 MG/DL — SIGNIFICANT CHANGE UP (ref 7–23)
CALCIUM SERPL-MCNC: 8.9 MG/DL — SIGNIFICANT CHANGE UP (ref 8.4–10.5)
CHLORIDE SERPL-SCNC: 100 MMOL/L — SIGNIFICANT CHANGE UP (ref 96–108)
CO2 SERPL-SCNC: 21 MMOL/L — LOW (ref 22–31)
CREAT SERPL-MCNC: 1.29 MG/DL — SIGNIFICANT CHANGE UP (ref 0.5–1.3)
GLUCOSE BLDC GLUCOMTR-MCNC: 103 MG/DL — HIGH (ref 70–99)
GLUCOSE BLDC GLUCOMTR-MCNC: 220 MG/DL — HIGH (ref 70–99)
GLUCOSE BLDC GLUCOMTR-MCNC: 251 MG/DL — HIGH (ref 70–99)
GLUCOSE BLDC GLUCOMTR-MCNC: 258 MG/DL — HIGH (ref 70–99)
GLUCOSE SERPL-MCNC: 213 MG/DL — HIGH (ref 70–99)
HCT VFR BLD CALC: 41.7 % — SIGNIFICANT CHANGE UP (ref 39–50)
HGB BLD-MCNC: 15.1 G/DL — SIGNIFICANT CHANGE UP (ref 13–17)
MAGNESIUM SERPL-MCNC: 2.4 MG/DL — SIGNIFICANT CHANGE UP (ref 1.6–2.6)
MCHC RBC-ENTMCNC: 29.6 PG — SIGNIFICANT CHANGE UP (ref 27–34)
MCHC RBC-ENTMCNC: 36.2 GM/DL — HIGH (ref 32–36)
MCV RBC AUTO: 81.9 FL — SIGNIFICANT CHANGE UP (ref 80–100)
PHOSPHATE SERPL-MCNC: 3 MG/DL — SIGNIFICANT CHANGE UP (ref 2.5–4.5)
PLATELET # BLD AUTO: 160 K/UL — SIGNIFICANT CHANGE UP (ref 150–400)
POTASSIUM SERPL-MCNC: 3.6 MMOL/L — SIGNIFICANT CHANGE UP (ref 3.5–5.3)
POTASSIUM SERPL-SCNC: 3.6 MMOL/L — SIGNIFICANT CHANGE UP (ref 3.5–5.3)
PROT SERPL-MCNC: 7 G/DL — SIGNIFICANT CHANGE UP (ref 6–8.3)
RBC # BLD: 5.1 M/UL — SIGNIFICANT CHANGE UP (ref 4.2–5.8)
RBC # FLD: 13.2 % — SIGNIFICANT CHANGE UP (ref 10.3–14.5)
SODIUM SERPL-SCNC: 135 MMOL/L — SIGNIFICANT CHANGE UP (ref 135–145)
WBC # BLD: 9.8 K/UL — SIGNIFICANT CHANGE UP (ref 3.8–10.5)
WBC # FLD AUTO: 9.8 K/UL — SIGNIFICANT CHANGE UP (ref 3.8–10.5)

## 2018-02-07 PROCEDURE — 99233 SBSQ HOSP IP/OBS HIGH 50: CPT | Mod: GC

## 2018-02-07 PROCEDURE — 99232 SBSQ HOSP IP/OBS MODERATE 35: CPT

## 2018-02-07 PROCEDURE — 93010 ELECTROCARDIOGRAM REPORT: CPT

## 2018-02-07 RX ORDER — TICAGRELOR 90 MG/1
1 TABLET ORAL
Qty: 60 | Refills: 4 | OUTPATIENT
Start: 2018-02-07 | End: 2018-07-06

## 2018-02-07 RX ORDER — INSULIN LISPRO 100/ML
16 VIAL (ML) SUBCUTANEOUS
Qty: 0 | Refills: 0 | Status: DISCONTINUED | OUTPATIENT
Start: 2018-02-07 | End: 2018-02-08

## 2018-02-07 RX ORDER — POTASSIUM CHLORIDE 20 MEQ
40 PACKET (EA) ORAL ONCE
Qty: 0 | Refills: 0 | Status: COMPLETED | OUTPATIENT
Start: 2018-02-07 | End: 2018-02-07

## 2018-02-07 RX ORDER — SPIRONOLACTONE 25 MG/1
25 TABLET, FILM COATED ORAL DAILY
Qty: 0 | Refills: 0 | Status: DISCONTINUED | OUTPATIENT
Start: 2018-02-07 | End: 2018-02-08

## 2018-02-07 RX ORDER — INSULIN LISPRO 100/ML
16 VIAL (ML) SUBCUTANEOUS
Qty: 0 | Refills: 0 | Status: DISCONTINUED | OUTPATIENT
Start: 2018-02-08 | End: 2018-02-08

## 2018-02-07 RX ORDER — INSULIN GLARGINE 100 [IU]/ML
45 INJECTION, SOLUTION SUBCUTANEOUS AT BEDTIME
Qty: 0 | Refills: 0 | Status: DISCONTINUED | OUTPATIENT
Start: 2018-02-07 | End: 2018-02-08

## 2018-02-07 RX ADMIN — Medication 25 MILLIGRAM(S): at 05:10

## 2018-02-07 RX ADMIN — Medication 1 PATCH: at 11:53

## 2018-02-07 RX ADMIN — ATORVASTATIN CALCIUM 80 MILLIGRAM(S): 80 TABLET, FILM COATED ORAL at 21:46

## 2018-02-07 RX ADMIN — TAMSULOSIN HYDROCHLORIDE 0.4 MILLIGRAM(S): 0.4 CAPSULE ORAL at 21:46

## 2018-02-07 RX ADMIN — Medication 16 UNIT(S): at 17:19

## 2018-02-07 RX ADMIN — Medication 81 MILLIGRAM(S): at 11:53

## 2018-02-07 RX ADMIN — Medication 40 MILLIEQUIVALENT(S): at 05:15

## 2018-02-07 RX ADMIN — TICAGRELOR 90 MILLIGRAM(S): 90 TABLET ORAL at 17:11

## 2018-02-07 RX ADMIN — HEPARIN SODIUM 5000 UNIT(S): 5000 INJECTION INTRAVENOUS; SUBCUTANEOUS at 05:13

## 2018-02-07 RX ADMIN — LISINOPRIL 5 MILLIGRAM(S): 2.5 TABLET ORAL at 05:09

## 2018-02-07 RX ADMIN — SPIRONOLACTONE 25 MILLIGRAM(S): 25 TABLET, FILM COATED ORAL at 11:53

## 2018-02-07 RX ADMIN — HEPARIN SODIUM 5000 UNIT(S): 5000 INJECTION INTRAVENOUS; SUBCUTANEOUS at 13:15

## 2018-02-07 RX ADMIN — Medication 6: at 07:51

## 2018-02-07 RX ADMIN — Medication 4: at 12:10

## 2018-02-07 RX ADMIN — Medication 2: at 21:46

## 2018-02-07 RX ADMIN — Medication 13 UNIT(S): at 07:51

## 2018-02-07 RX ADMIN — TICAGRELOR 90 MILLIGRAM(S): 90 TABLET ORAL at 05:10

## 2018-02-07 RX ADMIN — HEPARIN SODIUM 5000 UNIT(S): 5000 INJECTION INTRAVENOUS; SUBCUTANEOUS at 21:46

## 2018-02-07 RX ADMIN — INSULIN GLARGINE 45 UNIT(S): 100 INJECTION, SOLUTION SUBCUTANEOUS at 21:46

## 2018-02-07 RX ADMIN — Medication 13 UNIT(S): at 12:10

## 2018-02-07 NOTE — DIETITIAN INITIAL EVALUATION ADULT. - PERTINENT LABORATORY DATA
Na 135 [135 - 145], K+ 3.6 [3.5 - 5.3], BUN 23 [7 - 23], Cr 1.29 [0.50 - 1.30],  [70 - 99], Phos 3.0 [2.5 - 4.5], Alk Phos 76 [40 - 120], AST 43 [10 - 40], ALT 34 [10 - 45], Mg 2.4 [1.6 - 2.6], Ca 8.9 [8.4 - 10.5], HbA1c 11%; Fingersticks (2/5-7) [phone]

## 2018-02-07 NOTE — PROGRESS NOTE ADULT - SUBJECTIVE AND OBJECTIVE BOX
Diabetes Follow up note:  Interval Hx:  57 year old male w/uncontrolled T2DM w/CAD, nephropathy a/w STEMI s/p PCI. BG values remain elevated 200-300's on present insulin regimen but slowly improving. Pt seen at bedside. Reports good appetite. Amenable to basal/bolus therapy upon discharge. Met w/RD earlier at bedside.     Review of Systems:  General: "I feel much better"  GI: Tolerating POs without any N/V/D/ABD PAIN.  CV: No CP/SOB  ENDO: No S&Sx of hypoglycemia  MEDS:  atorvastatin 80 milliGRAM(s) Oral at bedtime    insulin glargine Injectable (LANTUS) 40 Unit(s) SubCutaneous at bedtime  insulin lispro (HumaLOG) corrective regimen sliding scale   SubCutaneous three times a day before meals  insulin lispro (HumaLOG) corrective regimen sliding scale   SubCutaneous at bedtime  insulin lispro Injectable (HumaLOG) 13 Unit(s) SubCutaneous before dinner  insulin lispro Injectable (HumaLOG) 13 Unit(s) SubCutaneous before breakfast  insulin lispro Injectable (HumaLOG) 13 Unit(s) SubCutaneous before lunch      Allergies    No Known Allergies        PE:  General: Male sitting in chair. NAD.   Vital Signs Last 24 Hrs  T(C): 36.9 (07 Feb 2018 07:00), Max: 36.9 (06 Feb 2018 16:00)  T(F): 98.4 (07 Feb 2018 07:00), Max: 98.5 (06 Feb 2018 19:00)  HR: 106 (07 Feb 2018 14:00) (80 - 106)  BP: 98/64 (07 Feb 2018 14:00) (87/63 - 119/81)  BP(mean): 79 (07 Feb 2018 14:00) (66 - 97)  RR: 17 (07 Feb 2018 14:00) (15 - 20)  SpO2: 97% (06 Feb 2018 18:00) (96% - 97%)  CV: S1, S2. NSR on monitor.   Abd: Soft, NT,ND,   Extremities: Warm. No edema  Neuro: A&O X3    LABS:    POCT Blood Glucose.: 220 mg/dL (02-07-18 @ 11:28)  POCT Blood Glucose.: 251 mg/dL (02-07-18 @ 06:44)  POCT Blood Glucose.: 239 mg/dL (02-06-18 @ 21:25)  POCT Blood Glucose.: 307 mg/dL (02-06-18 @ 17:11)  POCT Blood Glucose.: 367 mg/dL (02-06-18 @ 13:01)  POCT Blood Glucose.: 353 mg/dL (02-06-18 @ 11:54)  POCT Blood Glucose.: 303 mg/dL (02-06-18 @ 07:58)  POCT Blood Glucose.: 283 mg/dL (02-05-18 @ 21:50)  POCT Blood Glucose.: 284 mg/dL (02-05-18 @ 17:08)                            15.1   9.8   )-----------( 160      ( 07 Feb 2018 03:30 )             41.7       02-07    135  |  100  |  23  ----------------------------<  213<H>  3.6   |  21<L>  |  1.29    Ca    8.9      07 Feb 2018 03:30  Phos  3.0     02-07  Mg     2.4     02-07    TPro  7.0  /  Alb  3.6  /  TBili  0.3  /  DBili  x   /  AST  43<H>  /  ALT  34  /  AlkPhos  76  02-07      Thyroid Function Tests:  02-06 @ 04:53 TSH 1.92 FreeT4 -- T3 -- Anti TPO -- Anti Thyroglobulin Ab -- TSI --      Hemoglobin A1C, Whole Blood: 11.6 % <H> [4.0 - 5.6] (02-06-18 @ 04:53)            Contact number: anika [phone] or [phone]

## 2018-02-07 NOTE — PROGRESS NOTE ADULT - SUBJECTIVE AND OBJECTIVE BOX
Patient is a 57y old  Male who presents with a chief complaint of Chest Pain, AWSTEMI (06 Feb 2018 16:23)  HPI:  This is a 57 year old man Active Smoker with past medical history of MI/CAD with Stents, DMT2 requiring Insulin, HFrEF (37%) with ICD (St. Marc), HTN, HLD presents to [facility] ED from MD office with intermittent, non-radiating midsternal chest pain X 1 day in early stages 5/10 relieved with belching on presentation to MD office started radiating to right upper arm with intensity increasing to 10/10.  In ED, ECG showed VTE in V2-V3, received Aspirin, Plavix, Heparin Load and brought urgently to cath lab receiving NERI X 1 to Ramus via Right Radial Artery.  Patient states that several family members have the flu. (05 Feb 2018 18:22)    Overnight Event: wanted to leave AMA    REVIEW OF SYSTEMS:    MEDICATIONS  (STANDING):  aspirin  chewable 81 milliGRAM(s) Oral daily  atorvastatin 80 milliGRAM(s) Oral at bedtime  dextrose 5%. 1000 milliLiter(s) (50 mL/Hr) IV Continuous <Continuous>  dextrose 50% Injectable 12.5 Gram(s) IV Push once  dextrose 50% Injectable 25 Gram(s) IV Push once  dextrose 50% Injectable 25 Gram(s) IV Push once  heparin  Injectable 5000 Unit(s) SubCutaneous every 8 hours  influenza   Vaccine 0.5 milliLiter(s) IntraMuscular once  insulin glargine Injectable (LANTUS) 40 Unit(s) SubCutaneous at bedtime  insulin lispro (HumaLOG) corrective regimen sliding scale   SubCutaneous three times a day before meals  insulin lispro (HumaLOG) corrective regimen sliding scale   SubCutaneous at bedtime  insulin lispro Injectable (HumaLOG) 13 Unit(s) SubCutaneous before dinner  insulin lispro Injectable (HumaLOG) 13 Unit(s) SubCutaneous before breakfast  insulin lispro Injectable (HumaLOG) 13 Unit(s) SubCutaneous before lunch  lisinopril 5 milliGRAM(s) Oral daily  metoprolol succinate ER 25 milliGRAM(s) Oral daily  nicotine -  14 mG/24Hr(s) Patch 1 patch Transdermal daily  tamsulosin 0.4 milliGRAM(s) Oral at bedtime  ticagrelor 90 milliGRAM(s) Oral two times a day    MEDICATIONS  (PRN):  dextrose Gel 1 Dose(s) Oral once PRN Blood Glucose LESS THAN 70 milliGRAM(s)/deciLiter  glucagon  Injectable 1 milliGRAM(s) IntraMuscular once PRN Glucose <70 milliGRAM(s)/deciLiter      PHYSICAL EXAM:  Vital Signs Last 24 Hrs  T(C): 36.9 (07 Feb 2018 01:00), Max: 36.9 (06 Feb 2018 16:00)  T(F): 98.5 (07 Feb 2018 01:00), Max: 98.5 (06 Feb 2018 19:00)  HR: 82 (07 Feb 2018 05:00) (80 - 104)  BP: 113/74 (07 Feb 2018 05:00) (90/54 - 138/70)  BP(mean): 86 (07 Feb 2018 05:00) (66 - 104)  RR: 16 (07 Feb 2018 05:00) (15 - 29)  SpO2: 97% (06 Feb 2018 18:00) (95% - 98%)  I&O's Summary    06 Feb 2018 07:01  -  07 Feb 2018 07:00  --------------------------------------------------------  IN: 1525 mL / OUT: 1400 mL / NET: 125 mL      Appearance: Normal	  HEENT:   Normal oral mucosa, PERRL, EOMI	  Lymphatic: No lymphadenopathy  Cardiovascular: Normal S1 S2, No JVD, No murmurs, No edema  Respiratory: Lungs clear to auscultation	  Psychiatry: A & O x 3, Mood & affect appropriate  Gastrointestinal:  Soft, Non-tender, + BS	  Skin: No rashes, No ecchymoses, No cyanosis	  Neurologic: Non-focal  Extremities: Normal range of motion, No clubbing, cyanosis or edema  Vascular: Peripheral pulses palpable 2+ bilaterally    LABS:	 	                        15.1   9.8   )-----------( 160      ( 07 Feb 2018 03:30 )             41.7     Auto Eosinophil # x     / Auto Eosinophil % x     / Auto Neutrophil # x     / Auto Neutrophil % x     / BANDS % x                            16.1   9.8   )-----------( 187      ( 06 Feb 2018 03:15 )             43.7     Auto Eosinophil # x     / Auto Eosinophil % x     / Auto Neutrophil # x     / Auto Neutrophil % x     / BANDS % x                            16.1   10.7  )-----------( 205      ( 05 Feb 2018 16:10 )             45.0     Auto Eosinophil # 0.1   / Auto Eosinophil % 1.4   / Auto Neutrophil # 6.6   / Auto Neutrophil % 62.0  / BANDS % x        INR: 1.14 ratio (02-05 @ 16:10)    02-07    135  |  100  |  23  ----------------------------<  213<H>  3.6   |  21<L>  |  1.29  02-06    135  |  97  |  17  ----------------------------<  356<H>  4.6   |  25  |  1.30  02-06    137  |  100  |  19  ----------------------------<  270<H>  3.8   |  22  |  1.23    Ca    8.9      07 Feb 2018 03:30  Mg     2.4     02-07  Phos  3.0     02-07  TPro  7.0  /  Alb  3.6  /  TBili  0.3  /  DBili  x   /  AST  43<H>  /  ALT  34  /  AlkPhos  76  02-07  TPro  8.1  /  Alb  4.4  /  TBili  0.5  /  DBili  x   /  AST  90<H>  /  ALT  45  /  AlkPhos  93  02-06  TPro  7.3  /  Alb  3.9  /  TBili  0.3  /  DBili  x   /  AST  115<H>  /  ALT  42  /  AlkPhos  80  02-06      proBNP: Serum Pro-Brain Natriuretic Peptide: 571 pg/mL (02-06 @ 03:16)  Serum Pro-Brain Natriuretic Peptide: 300 pg/mL (02-05 @ 16:10)    Lipid Profile: 02-06 Chol 188 LDL Unable to calculate LDL Cholesterol --- Interpretive Comment (for adults 18 and over)  Optimal LDL Level may vary based on clinical situation  Below 70                  Ideal for people at very high risk of heart  disease  Below 100                Ideal for people at risk of heart disease  100 - 129                   [place]  130 - 159                   Borderline high  160 - 189                   High  190 and Above          Very high HDL 28<L> Trig 630<H>  HgA1c: 11.6 % (02-06 @ 04:53)    TSH: Thyroid Stimulating Hormone, Serum: 1.92 uIU/mL (02-06 @ 04:53)      CARDIAC MARKERS:   06 Feb 2018 11:06 Troponin 1.85 ng/mL / Creatine Kinase 890 U/L /  CKMB 37.6 ng/mL / CPK Mass Assay % 4.2 %   06 Feb 2018 03:16 Troponin 2.82 ng/mL / Creatine Kinase 1128 U/L /  CKMB 61.7 ng/mL / CPK Mass Assay % 5.5 %   05 Feb 2018 16:10 Troponin <0.01 ng/mL / Creatine Kinase 323 U/L /  CKMB 8.5 ng/mL / CPK Mass Assay % 2.6 %      TELEMETRY: 	    ECG:  	    PREVIOUS DIAGNOSTIC TESTING:    [ ]  Catheterization:< from: Cardiac Cath Lab - Adult (02.05.18 @ 16:05) >  EF estimated was 25 %.  CORONARY VESSELS: The coronary circulation is right dominant.  LM:   --  LM: Angiography showed minor luminal irregularities with no flow  limiting lesions.  LAD:   --  Ostial LAD: There was a 100 % stenosis.  CX:   --  Mid circumflex: There was a 30 % stenosis.  RI:   --  Ramus intermedius: There was a 100 % stenosis.  RCA:   --  RCA: Angiography showed minor luminal irregularities with no  flow limiting lesions.    < end of copied text >    	  [facility]  Contact #[phone]

## 2018-02-07 NOTE — PROGRESS NOTE ADULT - PROBLEM SELECTOR PLAN 1
-test BG AC/HS  -Increase Lantus 45 units QHS  -Increase Humalog 16 units AC meals  -c/w Humalog moderate correction scale AC and Mod HS scale  -Pt to schedule follow up apt @ [address] [place] [address] endo practice prior to discharge  -discharge plan: basal/bolus + Metformin (call prior to discharge for dose recs)  -discussed w/pt, RD and CCU NP -will follow 2/8  pager: [phone]

## 2018-02-07 NOTE — PROGRESS NOTE ADULT - SUBJECTIVE AND OBJECTIVE BOX
Patient seen and examined at bedside   Case discussed in detail with outpatient PMD    Patient is a 56 yo Male who presents with a chief complaint of Chest Pain, admitted for AWAlbemarleI s/p cardiac cath and drug eluting stent X 1 placement      PAST MEDICAL & SURGICAL HISTORY:  AICD (automatic cardioverter/defibrillator) present  Diabetes  Stented coronary artery  No significant past surgical history      MEDICATIONS  (STANDING):  aspirin  chewable 81 milliGRAM(s) Oral daily  atorvastatin 80 milliGRAM(s) Oral at bedtime  dextrose 5%. 1000 milliLiter(s) (50 mL/Hr) IV Continuous <Continuous>  dextrose 50% Injectable 12.5 Gram(s) IV Push once  dextrose 50% Injectable 25 Gram(s) IV Push once  dextrose 50% Injectable 25 Gram(s) IV Push once  heparin  Injectable 5000 Unit(s) SubCutaneous every 8 hours  influenza   Vaccine 0.5 milliLiter(s) IntraMuscular once  insulin glargine Injectable (LANTUS) 40 Unit(s) SubCutaneous at bedtime  insulin lispro (HumaLOG) corrective regimen sliding scale   SubCutaneous three times a day before meals  insulin lispro (HumaLOG) corrective regimen sliding scale   SubCutaneous at bedtime  insulin lispro Injectable (HumaLOG) 13 Unit(s) SubCutaneous before dinner  insulin lispro Injectable (HumaLOG) 13 Unit(s) SubCutaneous before breakfast  insulin lispro Injectable (HumaLOG) 13 Unit(s) SubCutaneous before lunch  lisinopril 5 milliGRAM(s) Oral daily  metoprolol succinate ER 25 milliGRAM(s) Oral daily  nicotine -  14 mG/24Hr(s) Patch 1 patch Transdermal daily  spironolactone 25 milliGRAM(s) Oral daily  tamsulosin 0.4 milliGRAM(s) Oral at bedtime  ticagrelor 90 milliGRAM(s) Oral two times a day    MEDICATIONS  (PRN):  dextrose Gel 1 Dose(s) Oral once PRN Blood Glucose LESS THAN 70 milliGRAM(s)/deciLiter  glucagon  Injectable 1 milliGRAM(s) IntraMuscular once PRN Glucose <70 milliGRAM(s)/deciLiter        REVIEW OF SYSTEMS:  CONSTITUTIONAL: (+) improving malaise  EYES: No acute change in vision.  ENT:  No difficulty hearing, tinnitus, vertigo  NECK: No pain or stiffness  RESPIRATORY: No cough, wheezing, hemoptysis, or shortness of breath  CARDIOVASCULAR: No active chest pain, palpitations, syncope, or leg swelling  GASTROINTESTINAL: No abdominal pain, N/V, hematemesis, diarrhea, melena, or hematochezia.  GENITOURINARY: No dysuria, frequency, hematuria  NEUROLOGICAL: No headaches, acute loss of strength, numbness, or tremors  SKIN: No rashes or lesions   LYMPH NODES: No enlarged glands  ENDOCRINE: No heat or cold intolerance  MUSCULOSKELETAL: No arthralgia, myalgia   	    Vital Signs Last 24 Hrs  T(C): 36.9 (2018 07:00), Max: 36.9 (2018 16:00)  T(F): 98.4 (2018 07:00), Max: 98.5 (2018 19:00)  HR: 90 (2018 09:00) (80 - 104)  BP: 98/68 (2018 09:00) (90/54 - 138/70)  BP(mean): 77 (2018 09:00) (66 - 104)  RR: 17 (2018 09:00) (15 - 29)  SpO2: 97% (2018 18:00) (96% - 98%)    Constitutional: WD, WN, NAD  HEENT: NC, AT  Neck:  Supple.  Respiratory:  CTA b/l. No wheezing, rhonchi, or rales. Not using accessory muscles of respiration.   Cardiovascular:  RRR. systolic murmur. No R/G. 2+ radial pulses b/l.   Gastrointestinal: Soft, NT, ND, normoactive bowel sounds.  No organomegaly, rigidity, or RT.  Extremities: WWP without cyanosis, clubbing or edema.  Neurological:  Alert and awake.  Crude sensation intact.  No acute motor deficits.       LABS:                     Phosphorus Level, Serum (18 @ 03:30)    Phosphorus Level, Serum: 3.0 mg/dL      Magnesium, Serum (18 @ 03:30)    Magnesium, Serum: 2.4 mg/dL      Comprehensive Metabolic Panel (18 @ 03:30)    Sodium, Serum: 135 mmol/L    Potassium, Serum: 3.6 mmol/L    Chloride, Serum: 100 mmol/L    Carbon Dioxide, Serum: 21 mmol/L    Anion Gap, Serum: 14 mmol/L    Blood Urea Nitrogen, Serum: 23 mg/dL    Creatinine, Serum: 1.29 mg/dL    Glucose, Serum: 213 mg/dL    Calcium, Total Serum: 8.9 mg/dL    Protein Total, Serum: 7.0 g/dL    Albumin, Serum: 3.6 g/dL    Bilirubin Total, Serum: 0.3 mg/dL    Alkaline Phosphatase, Serum: 76 U/L    Aspartate Aminotransferase (AST/SGOT): 43 U/L    Alanine Aminotransferase (ALT/SGPT): 34 U/L RC    eGFR if Non : 62: Interpretative comment  The units for eGFR are ml/min/1.73m2 (normalized body surface area). The  eGFR is calculated from a serum creatinine using the CKD-EPI equation.  Other variables required for calculation are race, age and sex. Among  patients with chronic kidney disease (CKD), the eGFR is useful in  determining the stage of disease according to KDOQI CKD classification.  All eGFR results are reported numerically with the following  interpretation.          GFR                    With                 Without     (ml/min/1.73 m2)    Kidney Damage       Kidney Damage        >= 90                    Stage 1                     Normal        60-89                    Stage 2                     Decreased GFR        30-59     Stage 3                     Stage 3        15-29                    Stage 4                     Stage 4        < 15                      Stage 5                     Stage 5  Each stage of CKD assumes that the associated GFR level has been in  effect for at least 3 months. Determination of stages one and two (with  eGFR > 59 ml/min/m2) requires estimation of kidney damage for at least 3  months as defined by structural or functional abnormalities.  Limitations: All estimates of GFR will be less accurate for patients at  extremes of muscle mass (including but not limited to frail elderly,  critically ill, or cancer patients), those with unusual diets, and those  with conditions associated with reduced secretion or extrarenal  elimination of creatinine. The eGFR equation is not recommended for use  in patients with unstable creatinine levels. mL/min/1.73M2    eGFR if African American: 72 mL/min/1.73M2      Complete Blood Count (18 @ 03:30)    WBC Count: 9.8 K/uL    RBC Count: 5.10 M/uL    Hemoglobin: 15.1 g/dL    Hematocrit: 41.7 %    Mean Cell Volume: 81.9 fl    Mean Cell Hemoglobin: 29.6 pg    Mean Cell Hemoglobin Conc: 36.2 gm/dL    Red Cell Distrib Width: 13.2 %    Platelet Count - Automated: 160 K/uL          RADIOLOGY & ADDITIONAL STUDIES:    < from: Cardiac Cath Lab - Adult (18 @ 16:05) >  [facility]  Department of Cardiology  [address] [place] [address]  [phone]  Cath Lab Report -- Comprehensive Report  Patient: SEVERIANO MARTINEZ  Study date: 2018  Account number: [id number]  MR number: [id number]  : 1960  Gender: Male  Race: W  Case Physician(s):  REAGAN Calhoun M.D.  Fellow:  Bandar Mack M.D.  Referring Physician:  Conrad Looney M.D.  INDICATIONS: Initial STEMI.  HISTORY: The patient has a historyof coronary artery disease. The patient  has hypertension and medication-treated dyslipidemia.  PROCEDURE:  --  Left heart catheterization with ventriculography.  --  Left coronary angiography.  --  Right coronary angiography.  --  Intervention on ramus intermedius: drug-eluting stent.  TECHNIQUE: The risks and alternatives of the procedures and conscious  sedation were explained to the patient and informed consent was obtained.  Cardiac catheterization performed urgently. Coronary intervention  performed urgently.  Local anesthetic given. Right radial artery access. Left heart  catheterization. Ventriculography was performed. Left coronary artery  angiography. The vessel was injected utilizing a catheter. Right coronary  artery angiography. The vessel was injected utilizing a catheter.  RADIATION EXPOSURE: 5.5 min. A successful drug-eluting stent was performed  on the 100 % lesion in the ramus intermedius. Following intervention there  was a 1 % residual stenosis. According to the ACC/AHA classification  system, this lesion was a type C lesion. There was MICHELLE 0 flow before the  procedure and MICHELLE 3 flow after the procedure. Vessel setup was performed.  A 6FR JL3.5 LAUNCHER guiding catheter was used to intubate the vessel.  Vessel setup was performed. A [employer] UNIVERSAL 190CM wire was used to cross  the lesion. Balloon angioplasty was performed, using a 2.5 X 20 EUPHORA  balloon, with 1 inflations and a maximum inflation pressure of 18 maggi. A  3.00 X 22 JAIDA drug-eluting stent was placed across the lesion and  deployed at a maximum inflation pressure of 14 maggi.  CONTRAST GIVEN: Omnipaque 101 ml. An IABP was not used.  MEDICATIONS GIVEN: Fentanyl, 25 mcg, IV. Midazolam, 1 mg, IV. Verapamil  (Isoptin, Calan, Covera), 2.5 mg, IA. Heparin, 3000 units, IA. Heparin,  2000 units, IV.  VENTRICLES: Analysis of regional contractile function demonstrated severe  anterolateral hypokinesis, apical dyskinesis, and diaphragmatic  dyskinesis. EF estimated was 25 %.  CORONARY VESSELS: The coronary circulation is right dominant.  LM:   --  LM: Angiography showed minor luminal irregularities with no flow  limiting lesions.  LAD:   --  Ostial LAD: There was a 100 % stenosis.  CX:   --  Mid circumflex: There was a 30 % stenosis.  RI:   --  Ramus intermedius: There was a 100 % stenosis.  RCA:   --  RCA: Angiography showed minor luminal irregularities with no  flow limiting lesions.  COMPLICATIONS: There were no complications. No complications occurred  during the cath lab visit.  DIAGNOSTIC RECOMMENDATIONS: ASA and Plavix for 1 year.  INTERVENTIONAL RECOMMENDATIONS: ASA and Plavix for 1 year.  Prepared and signed by  Stefan Mireles M.D.  Signed 2018 17:53:51  HEMODYNAMIC TABLES  Pressures:  Baseline  Pressures:  - HR: 94  Pressures:  - Rhythm:  Pressures:  -- Aortic Pressure (S/D/M): 116/74/73  Pressures:  -- Left Ventricle (s/edp): 108/23/--  Outputs:  Baseline  Outputs:  -- CALCULATIONS[age]  Outputs:  -- CALCULATIONS: Body Surface Area: 2.46  Outputs:  -- CALCULATIONS: Height in cm: 196.00  Outputs:  -- CALCULATIONS: Sex: Male  Outputs:  -- CALCULATIONS: Weight in k.40    < end of copied text >

## 2018-02-07 NOTE — CHART NOTE - NSCHARTNOTEFT_GEN_A_CORE
====================  CCU MIDNIGHT ROUNDS  ====================    SEVERIANO MARTINEZ  [id number]    ====================  SUMMARY:  ====================    58 yo M smoker, CAD (MI, stents x 2), DM2 (insulin), HFrEF (37%, s/p St Marc ICD), HTN, HLD, a/w anterior-septal STEMI s/p LHC 2/5 via RRA w/ NERI 100% ramus. % w/ collaterals.    ====================  NEW EVENTS:  ====================    No CP/palpitations/SOB. OOB and ambulating     ====================  VITALS (Last 12 hrs):  ====================    T(C): 37.3 (02-07-18 @ 19:15), Max: 37.3 (02-07-18 @ 19:15)  HR: 96 (02-07-18 @ 19:15) (84 - 106)  BP: 128/79 (02-07-18 @ 19:15) (87/63 - 128/79)  BP(mean): 99 (02-07-18 @ 19:15) (70 - 99)  RR: 18 (02-07-18 @ 19:15) (14 - 18)  SpO2: 99% (02-07-18 @ 19:15) (99% - 99%)    TELEMETRY: sinus     I&O's Summary    06 Feb 2018 07:01  -  07 Feb 2018 07:00  --------------------------------------------------------  IN: 1525 mL / OUT: 1400 mL / NET: 125 mL    07 Feb 2018 07:01  -  07 Feb 2018 22:59  --------------------------------------------------------  IN: 360 mL / OUT: 100 mL / NET: 260 mL    ====================  PLAN:  ====================  - DAPT, lipitor, toprol, lisinopril, TTE in AM to r/o LV thrombus   - insulin per endo  -pt teaching reinforced     Bridgette COOMBS/U  #[id number]

## 2018-02-07 NOTE — DIETITIAN INITIAL EVALUATION ADULT. - OTHER INFO
Nutrition consult received for education. Pt reports good appetite and po intakes of meals, 100% reported. No GI distress, +BM today. Pt wears full upper and lower dentures, denies chewing/swallowing difficulties. NKFA. PTA did not take any supplements. Pt with T2DM diagnosed 10 years ago, rarely checks fingersticks until 'I can feel my sugar being high' and may be as high as 400-500's. Pt states he has seen a dietitian at [facility] in the past, but admits to limited knowledge on diabetic diet. Pt reluctant to make dietary changes, encouragement provided.

## 2018-02-07 NOTE — DIETITIAN INITIAL EVALUATION ADULT. - ENERGY NEEDS
Height: 77 inches, Weight: 250 pounds  BMI: 29.6 kg/m2 IBW: 208 pounds (+/-10%), %IBW: 120%  Pertinent Info: Per chart, 58 y/o male with PMH of T2DM, MI/CAD, heart failure s/p ICD, HTN, HLD, CKD2, admitted with AWSTEMI s/p NERI to [facility], endocrine following for uncontrolled T2DM. No edema, no pressure ulcers noted at this time.

## 2018-02-07 NOTE — DIETITIAN INITIAL EVALUATION ADULT. - ORAL INTAKE PTA
Pt reports good appetite and po intakes of meals PTA, enjoys cooking for self and family at home. Pt skips most meals during the day, snacks on sweets and fruits, has large dinner - stuffed pork chops, chicken parm, Caesar salads, soups/stews. Pt states he does not drink any sweetened beverages, but per endocrine note pt's wife reported pt drinks soda. Pt also has fast foods and Chinese take outs, cookies and brownies. Pt does not like vegetables./good

## 2018-02-08 VITALS
HEART RATE: 88 BPM | DIASTOLIC BLOOD PRESSURE: 72 MMHG | SYSTOLIC BLOOD PRESSURE: 109 MMHG | TEMPERATURE: 98 F | RESPIRATION RATE: 16 BRPM

## 2018-02-08 LAB
ALBUMIN SERPL ELPH-MCNC: 3.7 G/DL — SIGNIFICANT CHANGE UP (ref 3.3–5)
ALP SERPL-CCNC: 73 U/L — SIGNIFICANT CHANGE UP (ref 40–120)
ALT FLD-CCNC: 33 U/L RC — SIGNIFICANT CHANGE UP (ref 10–45)
ANION GAP SERPL CALC-SCNC: 11 MMOL/L — SIGNIFICANT CHANGE UP (ref 5–17)
AST SERPL-CCNC: 30 U/L — SIGNIFICANT CHANGE UP (ref 10–40)
BASOPHILS # BLD AUTO: 0 K/UL — SIGNIFICANT CHANGE UP (ref 0–0.2)
BASOPHILS NFR BLD AUTO: 0.3 % — SIGNIFICANT CHANGE UP (ref 0–2)
BILIRUB SERPL-MCNC: 0.4 MG/DL — SIGNIFICANT CHANGE UP (ref 0.2–1.2)
BUN SERPL-MCNC: 24 MG/DL — HIGH (ref 7–23)
CALCIUM SERPL-MCNC: 9.2 MG/DL — SIGNIFICANT CHANGE UP (ref 8.4–10.5)
CHLORIDE SERPL-SCNC: 102 MMOL/L — SIGNIFICANT CHANGE UP (ref 96–108)
CO2 SERPL-SCNC: 22 MMOL/L — SIGNIFICANT CHANGE UP (ref 22–31)
CREAT SERPL-MCNC: 1.39 MG/DL — HIGH (ref 0.5–1.3)
EOSINOPHIL # BLD AUTO: 0.2 K/UL — SIGNIFICANT CHANGE UP (ref 0–0.5)
EOSINOPHIL NFR BLD AUTO: 2.2 % — SIGNIFICANT CHANGE UP (ref 0–6)
GLUCOSE BLDC GLUCOMTR-MCNC: 148 MG/DL — HIGH (ref 70–99)
GLUCOSE BLDC GLUCOMTR-MCNC: 166 MG/DL — HIGH (ref 70–99)
GLUCOSE SERPL-MCNC: 177 MG/DL — HIGH (ref 70–99)
HCT VFR BLD CALC: 42.3 % — SIGNIFICANT CHANGE UP (ref 39–50)
HGB BLD-MCNC: 15 G/DL — SIGNIFICANT CHANGE UP (ref 13–17)
LYMPHOCYTES # BLD AUTO: 2.5 K/UL — SIGNIFICANT CHANGE UP (ref 1–3.3)
LYMPHOCYTES # BLD AUTO: 29.7 % — SIGNIFICANT CHANGE UP (ref 13–44)
MAGNESIUM SERPL-MCNC: 2.3 MG/DL — SIGNIFICANT CHANGE UP (ref 1.6–2.6)
MCHC RBC-ENTMCNC: 29.1 PG — SIGNIFICANT CHANGE UP (ref 27–34)
MCHC RBC-ENTMCNC: 35.4 GM/DL — SIGNIFICANT CHANGE UP (ref 32–36)
MCV RBC AUTO: 82.2 FL — SIGNIFICANT CHANGE UP (ref 80–100)
MONOCYTES # BLD AUTO: 0.9 K/UL — SIGNIFICANT CHANGE UP (ref 0–0.9)
MONOCYTES NFR BLD AUTO: 11.1 % — SIGNIFICANT CHANGE UP (ref 2–14)
NEUTROPHILS # BLD AUTO: 4.7 K/UL — SIGNIFICANT CHANGE UP (ref 1.8–7.4)
NEUTROPHILS NFR BLD AUTO: 56.6 % — SIGNIFICANT CHANGE UP (ref 43–77)
PHOSPHATE SERPL-MCNC: 3.7 MG/DL — SIGNIFICANT CHANGE UP (ref 2.5–4.5)
PLATELET # BLD AUTO: 168 K/UL — SIGNIFICANT CHANGE UP (ref 150–400)
POTASSIUM SERPL-MCNC: 4.1 MMOL/L — SIGNIFICANT CHANGE UP (ref 3.5–5.3)
POTASSIUM SERPL-SCNC: 4.1 MMOL/L — SIGNIFICANT CHANGE UP (ref 3.5–5.3)
PROT SERPL-MCNC: 7 G/DL — SIGNIFICANT CHANGE UP (ref 6–8.3)
RBC # BLD: 5.15 M/UL — SIGNIFICANT CHANGE UP (ref 4.2–5.8)
RBC # FLD: 13.5 % — SIGNIFICANT CHANGE UP (ref 10.3–14.5)
SODIUM SERPL-SCNC: 135 MMOL/L — SIGNIFICANT CHANGE UP (ref 135–145)
WBC # BLD: 8.3 K/UL — SIGNIFICANT CHANGE UP (ref 3.8–10.5)
WBC # FLD AUTO: 8.3 K/UL — SIGNIFICANT CHANGE UP (ref 3.8–10.5)

## 2018-02-08 PROCEDURE — 99291 CRITICAL CARE FIRST HOUR: CPT | Mod: 25

## 2018-02-08 PROCEDURE — C8929: CPT

## 2018-02-08 PROCEDURE — 85610 PROTHROMBIN TIME: CPT

## 2018-02-08 PROCEDURE — C1725: CPT

## 2018-02-08 PROCEDURE — C1887: CPT

## 2018-02-08 PROCEDURE — 99238 HOSP IP/OBS DSCHRG MGMT 30/<: CPT

## 2018-02-08 PROCEDURE — 99152 MOD SED SAME PHYS/QHP 5/>YRS: CPT

## 2018-02-08 PROCEDURE — 81001 URINALYSIS AUTO W/SCOPE: CPT

## 2018-02-08 PROCEDURE — 80053 COMPREHEN METABOLIC PANEL: CPT

## 2018-02-08 PROCEDURE — C1769: CPT

## 2018-02-08 PROCEDURE — 84443 ASSAY THYROID STIM HORMONE: CPT

## 2018-02-08 PROCEDURE — 82550 ASSAY OF CK (CPK): CPT

## 2018-02-08 PROCEDURE — 85027 COMPLETE CBC AUTOMATED: CPT

## 2018-02-08 PROCEDURE — 84484 ASSAY OF TROPONIN QUANT: CPT

## 2018-02-08 PROCEDURE — C1894: CPT

## 2018-02-08 PROCEDURE — 93010 ELECTROCARDIOGRAM REPORT: CPT

## 2018-02-08 PROCEDURE — C1874: CPT

## 2018-02-08 PROCEDURE — 83880 ASSAY OF NATRIURETIC PEPTIDE: CPT

## 2018-02-08 PROCEDURE — 71045 X-RAY EXAM CHEST 1 VIEW: CPT

## 2018-02-08 PROCEDURE — 80061 LIPID PANEL: CPT

## 2018-02-08 PROCEDURE — 93306 TTE W/DOPPLER COMPLETE: CPT | Mod: 26

## 2018-02-08 PROCEDURE — 83036 HEMOGLOBIN GLYCOSYLATED A1C: CPT

## 2018-02-08 PROCEDURE — 82553 CREATINE MB FRACTION: CPT

## 2018-02-08 PROCEDURE — 93458 L HRT ARTERY/VENTRICLE ANGIO: CPT | Mod: 59

## 2018-02-08 PROCEDURE — 82962 GLUCOSE BLOOD TEST: CPT

## 2018-02-08 PROCEDURE — C9606: CPT | Mod: RI

## 2018-02-08 PROCEDURE — 93005 ELECTROCARDIOGRAM TRACING: CPT

## 2018-02-08 PROCEDURE — 85730 THROMBOPLASTIN TIME PARTIAL: CPT

## 2018-02-08 PROCEDURE — 90686 IIV4 VACC NO PRSV 0.5 ML IM: CPT

## 2018-02-08 PROCEDURE — 83735 ASSAY OF MAGNESIUM: CPT

## 2018-02-08 PROCEDURE — 84100 ASSAY OF PHOSPHORUS: CPT

## 2018-02-08 PROCEDURE — 99233 SBSQ HOSP IP/OBS HIGH 50: CPT

## 2018-02-08 RX ORDER — ASPIRIN/CALCIUM CARB/MAGNESIUM 324 MG
1 TABLET ORAL
Qty: 30 | Refills: 2
Start: 2018-02-08 | End: 2018-05-08

## 2018-02-08 RX ORDER — INSULIN GLARGINE 100 [IU]/ML
50 INJECTION, SOLUTION SUBCUTANEOUS
Qty: 0 | Refills: 2 | DISCHARGE
Start: 2018-02-08 | End: 2018-05-08

## 2018-02-08 RX ORDER — INSULIN LISPRO 100/ML
23 VIAL (ML) SUBCUTANEOUS
Qty: 0 | Refills: 0 | DISCHARGE
Start: 2018-02-08 | End: 2018-03-09

## 2018-02-08 RX ORDER — INSULIN GLARGINE 100 [IU]/ML
40 INJECTION, SOLUTION SUBCUTANEOUS
Qty: 0 | Refills: 2 | COMMUNITY
Start: 2018-02-08 | End: 2018-05-08

## 2018-02-08 RX ORDER — METFORMIN HYDROCHLORIDE 850 MG/1
1 TABLET ORAL
Qty: 60 | Refills: 2 | OUTPATIENT
Start: 2018-02-08 | End: 2018-05-08

## 2018-02-08 RX ORDER — INSULIN LISPRO 100/ML
12 VIAL (ML) SUBCUTANEOUS
Qty: 0 | Refills: 0 | Status: DISCONTINUED | OUTPATIENT
Start: 2018-02-08 | End: 2018-02-08

## 2018-02-08 RX ORDER — METOPROLOL TARTRATE 50 MG
1 TABLET ORAL
Qty: 30 | Refills: 2 | OUTPATIENT
Start: 2018-02-08 | End: 2018-05-08

## 2018-02-08 RX ORDER — INSULIN GLARGINE 100 [IU]/ML
40 INJECTION, SOLUTION SUBCUTANEOUS AT BEDTIME
Qty: 0 | Refills: 0 | Status: DISCONTINUED | OUTPATIENT
Start: 2018-02-08 | End: 2018-02-08

## 2018-02-08 RX ORDER — CARVEDILOL PHOSPHATE 80 MG/1
0 CAPSULE, EXTENDED RELEASE ORAL
Qty: 0 | Refills: 0 | COMMUNITY

## 2018-02-08 RX ORDER — TICAGRELOR 90 MG/1
1 TABLET ORAL
Qty: 0 | Refills: 0 | DISCHARGE
Start: 2018-02-08

## 2018-02-08 RX ORDER — INSULIN GLARGINE 100 [IU]/ML
50 INJECTION, SOLUTION SUBCUTANEOUS
Qty: 0 | Refills: 2 | COMMUNITY
Start: 2018-02-08 | End: 2018-05-08

## 2018-02-08 RX ORDER — NICOTINE POLACRILEX 2 MG
14 GUM BUCCAL
Qty: 14 | Refills: 2 | OUTPATIENT
Start: 2018-02-08 | End: 2018-05-08

## 2018-02-08 RX ORDER — LISINOPRIL 2.5 MG/1
1 TABLET ORAL
Qty: 30 | Refills: 2
Start: 2018-02-08 | End: 2018-05-08

## 2018-02-08 RX ORDER — METFORMIN HYDROCHLORIDE 850 MG/1
1000 TABLET ORAL
Qty: 0 | Refills: 0 | COMMUNITY

## 2018-02-08 RX ORDER — INSULIN GLARGINE 100 [IU]/ML
40 INJECTION, SOLUTION SUBCUTANEOUS
Qty: 1 | Refills: 2 | OUTPATIENT
Start: 2018-02-08 | End: 2018-05-08

## 2018-02-08 RX ORDER — INSULIN GLARGINE 100 [IU]/ML
0 INJECTION, SOLUTION SUBCUTANEOUS
Qty: 0 | Refills: 0 | COMMUNITY

## 2018-02-08 RX ORDER — ATORVASTATIN CALCIUM 80 MG/1
1 TABLET, FILM COATED ORAL
Qty: 30 | Refills: 2 | OUTPATIENT
Start: 2018-02-08 | End: 2018-05-08

## 2018-02-08 RX ORDER — INFLUENZA VIRUS VACCINE 15; 15; 15; 15 UG/.5ML; UG/.5ML; UG/.5ML; UG/.5ML
0 SUSPENSION INTRAMUSCULAR
Qty: 0 | Refills: 0 | COMMUNITY
Start: 2018-02-08

## 2018-02-08 RX ORDER — INSULIN LISPRO 100/ML
20 VIAL (ML) SUBCUTANEOUS
Qty: 0 | Refills: 0 | DISCHARGE
Start: 2018-02-08 | End: 2018-03-09

## 2018-02-08 RX ORDER — ATORVASTATIN CALCIUM 80 MG/1
0 TABLET, FILM COATED ORAL
Qty: 0 | Refills: 0 | COMMUNITY

## 2018-02-08 RX ORDER — LOSARTAN POTASSIUM 100 MG/1
100 TABLET, FILM COATED ORAL
Qty: 0 | Refills: 0 | COMMUNITY

## 2018-02-08 RX ORDER — INSULIN LISPRO 100/ML
12 VIAL (ML) SUBCUTANEOUS
Qty: 300 | Refills: 0
Start: 2018-02-08 | End: 2018-03-09

## 2018-02-08 RX ORDER — SPIRONOLACTONE 25 MG/1
1 TABLET, FILM COATED ORAL
Qty: 30 | Refills: 2
Start: 2018-02-08 | End: 2018-05-08

## 2018-02-08 RX ADMIN — Medication 25 MILLIGRAM(S): at 05:04

## 2018-02-08 RX ADMIN — Medication 16 UNIT(S): at 07:56

## 2018-02-08 RX ADMIN — Medication 1 PATCH: at 11:22

## 2018-02-08 RX ADMIN — LISINOPRIL 5 MILLIGRAM(S): 2.5 TABLET ORAL at 05:02

## 2018-02-08 RX ADMIN — Medication 12 UNIT(S): at 12:11

## 2018-02-08 RX ADMIN — TICAGRELOR 90 MILLIGRAM(S): 90 TABLET ORAL at 05:02

## 2018-02-08 RX ADMIN — INFLUENZA VIRUS VACCINE 0.5 MILLILITER(S): 15; 15; 15; 15 SUSPENSION INTRAMUSCULAR at 11:10

## 2018-02-08 RX ADMIN — Medication 81 MILLIGRAM(S): at 11:23

## 2018-02-08 RX ADMIN — HEPARIN SODIUM 5000 UNIT(S): 5000 INJECTION INTRAVENOUS; SUBCUTANEOUS at 05:02

## 2018-02-08 RX ADMIN — Medication 2: at 07:56

## 2018-02-08 RX ADMIN — SPIRONOLACTONE 25 MILLIGRAM(S): 25 TABLET, FILM COATED ORAL at 05:04

## 2018-02-08 RX ADMIN — Medication 1 PATCH: at 11:23

## 2018-02-08 NOTE — PROGRESS NOTE ADULT - PROBLEM SELECTOR PROBLEM 1
STEMI involving oth coronary artery of anterior wall
STEMI (ST elevation myocardial infarction)
Uncontrolled type 2 diabetes mellitus with stage 2 chronic kidney disease, with long-term current use of insulin
STEMI involving oth coronary artery of anterior wall
STEMI involving oth coronary artery of anterior wall
Uncontrolled type 2 diabetes mellitus with stage 2 chronic kidney disease, with long-term current use of insulin
STEMI (ST elevation myocardial infarction)

## 2018-02-08 NOTE — PROGRESS NOTE ADULT - SUBJECTIVE AND OBJECTIVE BOX
Patient seen and examined at bedside   Case discussed with medical team and outpatient PMD    no new acute events or complaints overnight    HPI: Briefly, this is a 58 yo Male who presents with a chief complaint of Chest Pain, admitted for [facility] [place] s/p cardiac cath and drug eluting stent X 1 placement      PAST MEDICAL & SURGICAL HISTORY:  AICD (automatic cardioverter/defibrillator) present  Diabetes  Stented coronary artery  No significant past surgical history      MEDICATIONS  (STANDING):  aspirin  chewable 81 milliGRAM(s) Oral daily  atorvastatin 80 milliGRAM(s) Oral at bedtime  dextrose 5%. 1000 milliLiter(s) (50 mL/Hr) IV Continuous <Continuous>  dextrose 50% Injectable 12.5 Gram(s) IV Push once  dextrose 50% Injectable 25 Gram(s) IV Push once  dextrose 50% Injectable 25 Gram(s) IV Push once  insulin glargine Injectable (LANTUS) 40 Unit(s) SubCutaneous at bedtime  insulin lispro (HumaLOG) corrective regimen sliding scale   SubCutaneous three times a day before meals  insulin lispro (HumaLOG) corrective regimen sliding scale   SubCutaneous at bedtime  insulin lispro Injectable (HumaLOG) 12 Unit(s) SubCutaneous before lunch  insulin lispro Injectable (HumaLOG) 12 Unit(s) SubCutaneous before dinner  insulin lispro Injectable (HumaLOG) 12 Unit(s) SubCutaneous before breakfast  lisinopril 5 milliGRAM(s) Oral daily  metoprolol succinate ER 25 milliGRAM(s) Oral daily  nicotine -  14 mG/24Hr(s) Patch 1 patch Transdermal daily  spironolactone 25 milliGRAM(s) Oral daily  tamsulosin 0.4 milliGRAM(s) Oral at bedtime  ticagrelor 90 milliGRAM(s) Oral two times a day    MEDICATIONS  (PRN):  dextrose Gel 1 Dose(s) Oral once PRN Blood Glucose LESS THAN 70 milliGRAM(s)/deciLiter  glucagon  Injectable 1 milliGRAM(s) IntraMuscular once PRN Glucose <70 milliGRAM(s)/deciLiter      REVIEW OF SYSTEMS:  CONSTITUTIONAL:no fevers  EYES: No acute change in vision.  ENT:  No difficulty hearing, tinnitus, vertigo  NECK: No pain or stiffness  RESPIRATORY: No cough, wheezing, hemoptysis, or shortness of breath  CARDIOVASCULAR: No active chest pain, palpitations, syncope, or leg swelling  GASTROINTESTINAL: No abdominal pain, N/V, hematemesis, diarrhea, melena, or hematochezia.  GENITOURINARY: No dysuria, frequency, hematuria  NEUROLOGICAL: No headaches, acute loss of strength, numbness, or tremors  SKIN: No rashes or lesions   LYMPH NODES: No enlarged glands  ENDOCRINE: No heat or cold intolerance  MUSCULOSKELETAL: No arthralgia, myalgia   	  Vital Signs Last 24 Hrs  T(C): 36.1 (2018 08:00), Max: 37.3 (2018 19:15)  T(F): 97 (2018 08:00), Max: 99.1 (2018 19:15)  HR: 82 (2018 08:00) (80 - 106)  BP: 101/83 (2018 08:00) (90/67 - 128/79)  BP(mean): 91 (2018 08:00) (72 - 99)  RR: 15 (2018 08:00) (13 - 18)  SpO2: 100% (2018 03:05) (98% - 100%)    Constitutional: WD, WN, NAD  HEENT: NC, AT  Neck:  Supple.  Respiratory:  CTA b/l. No wheezing, rhonchi, or rales. Not using accessory muscles of respiration.   Cardiovascular:  RRR. systolic murmur. No R/G. 2+ radial pulses b/l.   Gastrointestinal: Soft, NT, ND, normoactive bowel sounds.  No organomegaly, rigidity, or RT.  Extremities: WWP without cyanosis, clubbing or edema.  Neurological:  Alert and awake.  Crude sensation intact.  No acute motor deficits.       LABS:                 Comprehensive Metabolic Panel (18 @ 05:29)    Sodium, Serum: 135 mmol/L    Potassium, Serum: 4.1 mmol/L    Chloride, Serum: 102 mmol/L    Carbon Dioxide, Serum: 22 mmol/L    Anion Gap, Serum: 11 mmol/L    Blood Urea Nitrogen, Serum: 24 mg/dL    Creatinine, Serum: 1.39 mg/dL    Glucose, Serum: 177 mg/dL    Calcium, Total Serum: 9.2 mg/dL    Protein Total, Serum: 7.0 g/dL    Albumin, Serum: 3.7 g/dL    Bilirubin Total, Serum: 0.4 mg/dL    Alkaline Phosphatase, Serum: 73 U/L    Aspartate Aminotransferase (AST/SGOT): 30 U/L    Alanine Aminotransferase (ALT/SGPT): 33 U/L RC    eGFR if Non : 57: Interpretative comment  The units for eGFR are ml/min/1.73m2 (normalized body surface area). The  eGFR is calculated from a serum creatinine using the CKD-EPI equation.  Other variables required for calculation are race, age and sex. Among  patients with chronic kidney disease (CKD), the eGFR is useful in  determining the stage of disease according to KDOQI CKD classification.  All eGFR results are reported numerically with the following  interpretation.          GFR                    With                 Without     (ml/min/1.73 m2)    Kidney Damage       Kidney Damage        >= 90                    Stage 1                     Normal        60-89                    Stage 2                     Decreased GFR        30-59     Stage 3                     Stage 3        15-29                    Stage 4                     Stage 4        < 15                      Stage 5                     Stage 5  Each stage of CKD assumes that the associated GFR level has been in  effect for at least 3 months. Determination of stages one and two (with  eGFR > 59 ml/min/m2) requires estimation of kidney damage for at least 3  months as defined by structural or functional abnormalities.  Limitations: All estimates of GFR will be less accurate for patients at  extremes of muscle mass (including but not limited to frail elderly,  critically ill, or cancer patients), those with unusual diets, and those  with conditions associated with reduced secretion or extrarenal  elimination of creatinine. The eGFR equation is not recommended for use  in patients with unstable creatinine levels. mL/min/1.73M2    eGFR if African American: 66 mL/min/1.73M2          RADIOLOGY & ADDITIONAL STUDIES:    < from: TTE with Doppler (w/Cont) (18 @ 06:06) >    Patient name: SEVERIANO MARTINEZ  YOB: 1960   Age: 57 (M)   MR#: [id number]  Study Date: 2018  Location: [place] [id number]: Radha Springer RDCS  Study quality: Technically difficult  Referring Physician: Kylah Amaral MD  Blood Pressure: 90/67 mmHg  Height: 193 cm  Weight: 113 kg  BSA: 2.4 m2  ------------------------------------------------------------------------  PROCEDURE: Transthoracic echocardiogram with 2-D, M-Mode  and complete spectral and color flow Doppler. Verbal  consent was obtained for injection of echo contrast  following a discussion of risks and benefits. Following  intravenous injection of contrast, harmonic imaging was  performed.  INDICATION: Subsequent ST elevation (STEMI) myocardial  infarction of anterior wall (I22.0)  ------------------------------------------------------------------------  Dimensions:    Normal Values:  LA:     4.0    2.0 - 4.0 cm  Ao:     3.5    2.0 - 3.8 cm  SEPTUM: 1.0    0.6 - 1.2 cm  PWT:    1.2    0.6 - 1.1 cm  LVIDd:  6.5    3.0 - 5.6 cm  LVIDs:  5.3    1.8 - 4.0 cm  Derived variables:  LVMI: 132 g/m2  RWT: 0.36  EF (Visual Estimate): 20-25 %  ------------------------------------------------------------------------  Observations:  Mitral Valve: Grossly normal mitral valve. Minimal mitral  regurgitation.  Aortic Valve/Aorta: Trileaflet aortic valve. Mean  transaortic valve gradient equals 2 mm Hg, aortic valve  velocity time integral equals 20 cm. No aortic valve  regurgitation seen. Mean gradient is equal to 1 mm Hg, LVOT  velocity time integral equals 11 cm.  Normal aortic root size. (Ao: 3.5 cm at the sinuses of  Valsalva).  Left Atrium: Left atrium not well visualized.  Left Ventricle: Endocardial visualization enhanced with  intravenous injection ofecho contrast (Definity).  Severe  segmental left ventricular systolic dysfunction. The  anteroseptal, inferoseptal, anterior walls and all apical  segments are akinetic.  No left ventricular thrombus.  Swirling of contrast suggests low flow.  Moderate left  ventricular enlargement. Mild diastolic dysfunction (Stage  I).  Right Heart: Normal right atrium. The right ventricle is  not well visualized; grossly normal right ventricular  systolic function. TAPSE > 2.0 cm (normal).  Tricuspid  valve not well visualized. Pulmonic valve not well  visualized, probably normal.  Pericardium/Pleura: Trace pericardial effusion.  Hemodynamic: Estimated right atrial pressure is 8 mm Hg.  Inadequate tricuspid regurgitation Doppler envelope  precludes estimation of RVSP.  ------------------------------------------------------------------------  Conclusions:  1. Endocardial visualization enhanced with intravenous  injection of echo contrast (Definity).  Severe segmental  left ventricular systolic dysfunction. The anteroseptal,  inferoseptal, anterior walls and all apical segments are  akinetic.  No left ventricular thrombus. Swirling of  contrast suggests low flow.  2. Mild diastolic dysfunction (Stage I).  3. Trace pericardial effusion.  *** No previous Echo exam. Technically difficult (portable)  study.  ------------------------------------------------------------------------  Revised on 2018 - 9:14 AM by Major Remy M.D.  ------------------------------------------------------------------------    < end of copied text >        < from: Cardiac Cath Lab - Adult (18 @ 16:05) >  [facility]  Department of Cardiology  [address] [place] [address] [phone]  Cath Lab Report -- Comprehensive Report  Patient: SEVERIANO MARTINEZ  Study date: 2018  Account number: [id number]  MR number: [id number]  : 1960  Gender: Male  Race: W  Case Physician(s):  REAGAN Calhoun M.D.  Fellow:  Bandar Mack M.D.  Referring Physician:  Conrad Looney M.D.  INDICATIONS: Initial STEMI.  HISTORY: The patient has a historyof coronary artery disease. The patient  has hypertension and medication-treated dyslipidemia.  PROCEDURE:  --  Left heart catheterization with ventriculography.  --  Left coronary angiography.  --  Right coronary angiography.  --  Intervention on ramus intermedius: drug-eluting stent.  TECHNIQUE: The risks and alternatives of the procedures and conscious  sedation were explained to the patient and informed consent was obtained.  Cardiac catheterization performed urgently. Coronary intervention  performed urgently.  Local anesthetic given. Right radial artery access. Left heart  catheterization. Ventriculography was performed. Left coronary artery  angiography. The vessel was injected utilizing a catheter. Right coronary  artery angiography. The vessel was injected utilizing a catheter.  RADIATION EXPOSURE: 5.5 min. A successful drug-eluting stent was performed  on the 100 % lesion in the ramus intermedius. Following intervention there  was a 1 % residual stenosis. According to the ACC/AHA classification  system, this lesion was a type C lesion. There was MICHELLE 0 flow before the  procedure and MICHELLE 3 flow after the procedure. Vessel setup was performed.  A 6FR JL3.5 LAUNCHER guiding catheter was used to intubate the vessel.  Vessel setup was performed. A BMW UNIVERSAL 190CM wire was used to cross  the lesion. Balloon angioplasty was performed, using a 2.5 X 20 EUPHORA  balloon, with 1 inflations and a maximum inflation pressure of 18 maggi. A  3.00 X 22 JAIDA drug-eluting stent was placed across the lesion and  deployed at a maximum inflation pressure of 14 maggi.  CONTRAST GIVEN: Omnipaque 101 ml. An IABP was not used.  MEDICATIONS GIVEN: Fentanyl, 25 mcg, IV. Midazolam, 1 mg, IV. Verapamil  (Isoptin, Calan, Covera), 2.5 mg, IA. Heparin, 3000 units, IA. Heparin,  2000 units, IV.  VENTRICLES: Analysis of regional contractile function demonstrated severe  anterolateral hypokinesis, apical dyskinesis, and diaphragmatic  dyskinesis. EF estimated was 25 %.  CORONARY VESSELS: The coronary circulation is right dominant.  LM:   --  LM: Angiography showed minor luminal irregularities with no flow  limiting lesions.  LAD:   --  Ostial LAD: There was a 100 % stenosis.  CX:   --  Mid circumflex: There was a 30 % stenosis.  RI:   --  Ramus intermedius: There was a 100 % stenosis.  RCA:   --  RCA: Angiography showed minor luminal irregularities with no  flow limiting lesions.  COMPLICATIONS: There were no complications. No complications occurred  during the cath lab visit.  DIAGNOSTIC RECOMMENDATIONS: ASA and Plavix for 1 year.  INTERVENTIONAL RECOMMENDATIONS: ASA and Plavix for 1 year.  Prepared and signed by  Stefan Mireles M.D.  Signed 2018 17:53:51  HEMODYNAMIC TABLES  Pressures:  Baseline  Pressures:  - HR: 94  Pressures:  - Rhythm:  Pressures:  -- Aortic Pressure (S/D/M): 116/74/73  Pressures:  -- Left Ventricle (s/edp): 108/23/--  Outputs:  Baseline  Outputs:  -- CALCULATIONS[age]  Outputs:  -- CALCULATIONS: Body Surface Area: 2.46  Outputs:  -- CALCULATIONS: Height in cm: 196.00  Outputs:  -- CALCULATIONS: Sex: Male  Outputs:  -- CALCULATIONS: Weight in k.40    < end of copied text >

## 2018-02-08 NOTE — PROGRESS NOTE ADULT - PROBLEM SELECTOR PLAN 1
Discharge plan:  -Pt to take Lantus 40 units QHS (has instructions to titrate upward for fasting glucose less than 140mg/dl)  -Start Humalog 12 units AC meals (provided pt w/scale to use to correct for hyperglycemia). RX sent to pharmacy for Humalog Kwik Pen and Pt given RX for Caron pen needles  -Pt to restart Metformin 1000mg BID and stop Januvia  -Pt wishes to call endocrine office to schedule his own appointment as follow up. Office information provided to pt ([address] [place] [address] [address] [place] [phone])  -Pt has glucometer and testing supplies home  -Spent >75 minutes of education, coordination of care w/pt, wife and team  pager: [phone]

## 2018-02-08 NOTE — PROGRESS NOTE ADULT - PROBLEM SELECTOR PROBLEM 3
Uncontrolled type 2 diabetes mellitus with stage 2 chronic kidney disease, with long-term current use of insulin
Hyperlipidemia, unspecified hyperlipidemia type
Uncontrolled type 2 diabetes mellitus with stage 2 chronic kidney disease, with long-term current use of insulin
Prophylactic measure
Uncontrolled type 2 diabetes mellitus with stage 2 chronic kidney disease, with long-term current use of insulin

## 2018-02-08 NOTE — PROGRESS NOTE ADULT - PROBLEM SELECTOR PLAN 3
Improving acks  C/w lantus, ihss, and humalog premeal  Adjust Rx prn  DM education
high dose atorvastatin 80mg QHS
Improving acks  C/w lantus, ihss, and humalog premeal  will d/c on metformin and basal/bolus insulin  DM education
HSQ for DVT prophylaxis
continue pre-meal humolog and lantus qhs,   Endo following, recommend basal/bolus insulin, and metformin.

## 2018-02-08 NOTE — PROGRESS NOTE ADULT - SUBJECTIVE AND OBJECTIVE BOX
Diabetes Follow up note:  Interval Hx:    glycemic control     Review of Systems:  General:  GI: Tolerating POs without any N/V/D/ABD PAIN.  CV: No CP/SOB  ENDO: No S&Sx of hypoglycemia  MEDS:  atorvastatin 80 milliGRAM(s) Oral at bedtime  dextrose 50% Injectable 12.5 Gram(s) IV Push once  dextrose 50% Injectable 25 Gram(s) IV Push once  dextrose 50% Injectable 25 Gram(s) IV Push once  dextrose Gel 1 Dose(s) Oral once PRN  glucagon  Injectable 1 milliGRAM(s) IntraMuscular once PRN  insulin glargine Injectable (LANTUS) 40 Unit(s) SubCutaneous at bedtime  insulin lispro (HumaLOG) corrective regimen sliding scale   SubCutaneous three times a day before meals  insulin lispro (HumaLOG) corrective regimen sliding scale   SubCutaneous at bedtime  insulin lispro Injectable (HumaLOG) 12 Unit(s) SubCutaneous before lunch  insulin lispro Injectable (HumaLOG) 12 Unit(s) SubCutaneous before dinner  insulin lispro Injectable (HumaLOG) 12 Unit(s) SubCutaneous before breakfast      Allergies    No Known Allergies    Intolerances      PE:  General:  Vital Signs Last 24 Hrs  T(C): 36.1 (08 Feb 2018 08:00), Max: 37.3 (07 Feb 2018 19:15)  T(F): 97 (08 Feb 2018 08:00), Max: 99.1 (07 Feb 2018 19:15)  HR: 82 (08 Feb 2018 08:00) (80 - 106)  BP: 101/83 (08 Feb 2018 08:00) (90/67 - 128/79)  BP(mean): 91 (08 Feb 2018 08:00) (72 - 99)  RR: 15 (08 Feb 2018 08:00) (13 - 18)  SpO2: 100% (08 Feb 2018 03:05) (98% - 100%)  Abd: Soft, NT,ND,   Extremities: Warm  Neuro: A&O X3    LABS:    POCT Blood Glucose.: 166 mg/dL (02-08-18 @ 07:35)  POCT Blood Glucose.: 258 mg/dL (02-07-18 @ 20:46)  POCT Blood Glucose.: 103 mg/dL (02-07-18 @ 17:14)  POCT Blood Glucose.: 220 mg/dL (02-07-18 @ 11:28)  POCT Blood Glucose.: 251 mg/dL (02-07-18 @ 06:44)  POCT Blood Glucose.: 239 mg/dL (02-06-18 @ 21:25)  POCT Blood Glucose.: 307 mg/dL (02-06-18 @ 17:11)  POCT Blood Glucose.: 367 mg/dL (02-06-18 @ 13:01)  POCT Blood Glucose.: 353 mg/dL (02-06-18 @ 11:54)  POCT Blood Glucose.: 303 mg/dL (02-06-18 @ 07:58)  POCT Blood Glucose.: 283 mg/dL (02-05-18 @ 21:50)  POCT Blood Glucose.: 284 mg/dL (02-05-18 @ 17:08)                            15.0   8.3   )-----------( 168      ( 08 Feb 2018 05:29 )             42.3       02-08    135  |  102  |  24<H>  ----------------------------<  177<H>  4.1   |  22  |  1.39<H>    Ca    9.2      08 Feb 2018 05:29  Phos  3.7     02-08  Mg     2.3     02-08    TPro  7.0  /  Alb  3.7  /  TBili  0.4  /  DBili  x   /  AST  30  /  ALT  33  /  AlkPhos  73  02-08      Thyroid Function Tests:  02-06 @ 04:53 TSH 1.92 FreeT4 -- T3 -- Anti TPO -- Anti Thyroglobulin Ab -- TSI --      Hemoglobin A1C, Whole Blood: 11.6 % <H> [4.0 - 5.6] (02-06-18 @ 04:53)            Contact number: anika [phone] or [phone] Diabetes Follow up note:  Interval Hx:  57 year old male w/uncontrolled T2DM w/CAD, nephropathy a/w STEMI s/p PCI. Pt going home today. Met w/pt and wife at bedside to discuss discharge plan and review basal/bolus therapy. BG values improved on basal/bolus therapy while inpatient.     Review of Systems:  General: No complaints.   GI: Tolerating POs without any N/V/D/ABD PAIN.  CV: No CP/SOB  ENDO: No S&Sx of hypoglycemia  MEDS:  atorvastatin 80 milliGRAM(s) Oral at bedtime    insulin glargine Injectable (LANTUS) 40 Unit(s) SubCutaneous at bedtime  insulin lispro (HumaLOG) corrective regimen sliding scale   SubCutaneous three times a day before meals  insulin lispro (HumaLOG) corrective regimen sliding scale   SubCutaneous at bedtime  insulin lispro Injectable (HumaLOG) 12 Unit(s) SubCutaneous before lunch  insulin lispro Injectable (HumaLOG) 12 Unit(s) SubCutaneous before dinner  insulin lispro Injectable (HumaLOG) 12 Unit(s) SubCutaneous before breakfast      Allergies    No Known Allergies        PE:  General: Male sitting in chair. NAD  Vital Signs Last 24 Hrs  T(C): 36.1 (08 Feb 2018 08:00), Max: 37.3 (07 Feb 2018 19:15)  T(F): 97 (08 Feb 2018 08:00), Max: 99.1 (07 Feb 2018 19:15)  HR: 82 (08 Feb 2018 08:00) (80 - 106)  BP: 101/83 (08 Feb 2018 08:00) (90/67 - 128/79)  BP(mean): 91 (08 Feb 2018 08:00) (72 - 99)  RR: 15 (08 Feb 2018 08:00) (13 - 18)  SpO2: 100% (08 Feb 2018 03:05) (98% - 100%)  CV: S1, S2. NSR on monitor.   Abd: Soft, NT,ND, No Hyperlipotrophy.   Extremities: Warm. No edema  Neuro: A&O X3    LABS:    POCT Blood Glucose.: 166 mg/dL (02-08-18 @ 07:35)  POCT Blood Glucose.: 258 mg/dL (02-07-18 @ 20:46)  POCT Blood Glucose.: 103 mg/dL (02-07-18 @ 17:14)  POCT Blood Glucose.: 220 mg/dL (02-07-18 @ 11:28)  POCT Blood Glucose.: 251 mg/dL (02-07-18 @ 06:44)  POCT Blood Glucose.: 239 mg/dL (02-06-18 @ 21:25)  POCT Blood Glucose.: 307 mg/dL (02-06-18 @ 17:11)  POCT Blood Glucose.: 367 mg/dL (02-06-18 @ 13:01)  POCT Blood Glucose.: 353 mg/dL (02-06-18 @ 11:54)  POCT Blood Glucose.: 303 mg/dL (02-06-18 @ 07:58)  POCT Blood Glucose.: 283 mg/dL (02-05-18 @ 21:50)  POCT Blood Glucose.: 284 mg/dL (02-05-18 @ 17:08)                            15.0   8.3   )-----------( 168      ( 08 Feb 2018 05:29 )             42.3       02-08    135  |  102  |  24<H>  ----------------------------<  177<H>  4.1   |  22  |  1.39<H>    Ca    9.2      08 Feb 2018 05:29  Phos  3.7     02-08  Mg     2.3     02-08    TPro  7.0  /  Alb  3.7  /  TBili  0.4  /  DBili  x   /  AST  30  /  ALT  33  /  AlkPhos  73  02-08      Thyroid Function Tests:  02-06 @ 04:53 TSH 1.92 FreeT4 -- T3 -- Anti TPO -- Anti Thyroglobulin Ab -- TSI --      Hemoglobin A1C, Whole Blood: 11.6 % <H> [4.0 - 5.6] (02-06-18 @ 04:53)            Contact number: anika [phone] or [phone]

## 2018-02-08 NOTE — PROGRESS NOTE ADULT - PROBLEM SELECTOR PLAN 4
Hemodynamically stable  C/w current meds
-continue pre-meal humolog and lantus qhs, RISS AC/HS  -f/u w/ endo
Hemodynamically stable  C/w current meds
c/w high dose atorvastatin 80mg QHS.

## 2018-02-08 NOTE — PROGRESS NOTE ADULT - ATTENDING COMMENTS
Patient is seen and examined with fellow, NP and the CCU house-staff. I agree with the history, physical and the assessment and plan.  STEMI s/p PCI with NERI to RAMUS; OCT of LAD with collaterals  - educated on the importance of DAPT   - Ace-inhibitor initiated  - Beta-blocker initiated  - patient is on Lipitor 80 mg daily  - trend cardiac enzymes  - TTE prior to discharge
Patient is seen and examined with fellow, NP and the CCU house-staff. I agree with the history, physical and the assessment and plan.  STEMI s/p PCI with NERI to [place]  - educated on the importance of DAPT   - Ace-inhibitor initiated  - Beta-blocker initiated  - patient is on Lipitor 80 mg daily  - TTE results noted
Patient is seen and examined with fellow, NP and the CCU house-staff. I agree with the history, physical and the assessment and plan.  STEMI s/p PCI with NERI to [place]  - educated on the importance of DAPT   - Ace-inhibitor initiated  - Beta-blocker initiated  - patient is on Lipitor 80 mg daily  - TTE prior to discharge

## 2018-02-08 NOTE — PROGRESS NOTE ADULT - PROBLEM SELECTOR PROBLEM 2
Systolic dysfunction without heart failure
Systolic dysfunction
Diabetes
Systolic dysfunction without heart failure
Systolic dysfunction

## 2018-02-08 NOTE — PROGRESS NOTE ADULT - PROBLEM SELECTOR PROBLEM 5
Hyperlipidemia, unspecified hyperlipidemia type
Prophylactic measure
Hyperlipidemia, unspecified hyperlipidemia type

## 2018-02-08 NOTE — PROGRESS NOTE ADULT - PROBLEM SELECTOR PROBLEM 4
Essential hypertension
Uncontrolled type 2 diabetes mellitus with stage 2 chronic kidney disease, with long-term current use of insulin
Essential hypertension
Hyperlipidemia, unspecified hyperlipidemia type

## 2018-02-08 NOTE — PROGRESS NOTE ADULT - ASSESSMENT
56 y/o active smoker with pmhx: HTN, HLD, MI/CAD with Stents, DMT2 requiring Insulin, HFrEF (37%) with ICD ([employer]), admitted for Anterioro/septalSTEMI
56 y/o M PMH HTN, HLD, uncontrolled DM2 on insulin, CAD, HFrEF, presents with STEMI, also with hyperglycemia in setting of uncontrolled DM2. BG values improved on basal/bolus but still above goal (100-180mg/dl). Recommend increasing basal/bolus to improve glycemic control. Pt amenable to basal/bolus therapy upon discharge and expressed desire to follow up with Endocrinologist. Resistant to dietary modifications (discussed w/RD) at this time but expressed desire to increase physical activity and lose weight to prevent further cardiovascular complications.
This is a 57 year old man Active Smoker with past medical history of MI/CAD with Stents, DMT2 requiring Insulin, HFrEF (37%) with ICD ([employer]), HTN, HLD presents to [facility] ED from MD office with intermittent, non-radiating midsternal chest pain X 1 day, admitted for STEMI, s/p PCI with NERI X 1 placement
57 year old man Active Smoker with past medical history of HTN, HLD, MI/CAD w/ Stents, HFrEF (37%) with ICD ([employer]). admitted with AnteroseptalSTEMI.  Now S/P found to have 100%  pLAD with collaterals 100% prox Ramus s/p 1 NEIR. LVEDP 25.
58 y/o M PMH HTN, HLD, uncontrolled DM2 on insulin, CAD, HFrEF, presents with STEMI, also with hyperglycemia in setting of uncontrolled DM2. BG values improved, going home today. Met w/pt and wife at bedside and provided education.     Met with patient and reviewed the following:    -A1c LEVEL  -Blood glucose goals  -Glucose monitoring frequency  -Hypoglycemia- prevention, detection and treatment  -Insulin(s) action, time of administration and side effects. Reviewed insulin storage and rotation of injection sites  -Importance of follow up care  -dietary modifications, portion control w/plate method    Reviewed use of insulin pen.  Pt able to give teach back with no assistance
Patient is a 57y old  Male who presents with a chief complaint of Chest Pain, AWSTEMI. 100% occlusion Ramus, 1 NERI placed.
This is a 57 year old man Active Smoker with past medical history of MI/CAD with Stents, DMT2 requiring Insulin, HFrEF (37%) with ICD ([employer]), HTN, HLD presents to [facility] ED from MD office with intermittent, non-radiating midsternal chest pain X 1 day, admitted for STEMI, s/p PCI with NERI X 1 placement

## 2018-02-13 ENCOUNTER — APPOINTMENT (OUTPATIENT)
Dept: CARDIOLOGY | Facility: CLINIC | Age: 58
End: 2018-02-13
Payer: COMMERCIAL

## 2018-02-13 ENCOUNTER — NON-APPOINTMENT (OUTPATIENT)
Age: 58
End: 2018-02-13

## 2018-02-13 VITALS
DIASTOLIC BLOOD PRESSURE: 78 MMHG | OXYGEN SATURATION: 99 % | BODY MASS INDEX: 29.65 KG/M2 | WEIGHT: 250 LBS | SYSTOLIC BLOOD PRESSURE: 126 MMHG | HEART RATE: 104 BPM

## 2018-02-13 VITALS — HEART RATE: 97 BPM

## 2018-02-13 PROCEDURE — 93000 ELECTROCARDIOGRAM COMPLETE: CPT | Mod: 59

## 2018-02-13 PROCEDURE — 93290 INTERROG DEV EVAL ICPMS IP: CPT

## 2018-02-13 PROCEDURE — 99215 OFFICE O/P EST HI 40 MIN: CPT | Mod: 25

## 2018-03-13 ENCOUNTER — APPOINTMENT (OUTPATIENT)
Dept: CARDIOLOGY | Facility: CLINIC | Age: 58
End: 2018-03-13
Payer: COMMERCIAL

## 2018-03-13 ENCOUNTER — NON-APPOINTMENT (OUTPATIENT)
Age: 58
End: 2018-03-13

## 2018-03-13 VITALS
DIASTOLIC BLOOD PRESSURE: 69 MMHG | OXYGEN SATURATION: 98 % | BODY MASS INDEX: 27.75 KG/M2 | SYSTOLIC BLOOD PRESSURE: 102 MMHG | WEIGHT: 235 LBS | HEIGHT: 77 IN | HEART RATE: 96 BPM

## 2018-03-13 PROCEDURE — 99214 OFFICE O/P EST MOD 30 MIN: CPT

## 2018-03-13 PROCEDURE — 93000 ELECTROCARDIOGRAM COMPLETE: CPT

## 2018-04-06 ENCOUNTER — APPOINTMENT (OUTPATIENT)
Dept: CARDIOLOGY | Facility: CLINIC | Age: 58
End: 2018-04-06
Payer: COMMERCIAL

## 2018-04-06 PROCEDURE — 93015 CV STRESS TEST SUPVJ I&R: CPT

## 2018-04-10 ENCOUNTER — APPOINTMENT (OUTPATIENT)
Dept: CARDIOLOGY | Facility: CLINIC | Age: 58
End: 2018-04-10
Payer: COMMERCIAL

## 2018-04-10 PROCEDURE — 93798 PHYS/QHP OP CAR RHAB W/ECG: CPT

## 2018-04-19 ENCOUNTER — APPOINTMENT (OUTPATIENT)
Dept: CARDIOLOGY | Facility: CLINIC | Age: 58
End: 2018-04-19
Payer: COMMERCIAL

## 2018-04-19 PROCEDURE — 93798 PHYS/QHP OP CAR RHAB W/ECG: CPT

## 2018-04-25 ENCOUNTER — APPOINTMENT (OUTPATIENT)
Dept: CARDIOLOGY | Facility: CLINIC | Age: 58
End: 2018-04-25
Payer: COMMERCIAL

## 2018-04-25 PROCEDURE — 93798 PHYS/QHP OP CAR RHAB W/ECG: CPT

## 2018-04-26 ENCOUNTER — APPOINTMENT (OUTPATIENT)
Dept: CARDIOLOGY | Facility: CLINIC | Age: 58
End: 2018-04-26
Payer: COMMERCIAL

## 2018-04-26 PROCEDURE — 93798 PHYS/QHP OP CAR RHAB W/ECG: CPT

## 2018-04-30 ENCOUNTER — APPOINTMENT (OUTPATIENT)
Dept: CARDIOLOGY | Facility: CLINIC | Age: 58
End: 2018-04-30
Payer: COMMERCIAL

## 2018-04-30 PROCEDURE — 93798 PHYS/QHP OP CAR RHAB W/ECG: CPT

## 2018-05-01 ENCOUNTER — APPOINTMENT (OUTPATIENT)
Dept: CARDIOLOGY | Facility: CLINIC | Age: 58
End: 2018-05-01
Payer: COMMERCIAL

## 2018-05-01 ENCOUNTER — NON-APPOINTMENT (OUTPATIENT)
Age: 58
End: 2018-05-01

## 2018-05-01 VITALS
SYSTOLIC BLOOD PRESSURE: 134 MMHG | BODY MASS INDEX: 29.76 KG/M2 | HEART RATE: 80 BPM | DIASTOLIC BLOOD PRESSURE: 80 MMHG | HEIGHT: 77 IN | WEIGHT: 252 LBS

## 2018-05-01 PROCEDURE — 93000 ELECTROCARDIOGRAM COMPLETE: CPT

## 2018-05-01 PROCEDURE — 99214 OFFICE O/P EST MOD 30 MIN: CPT

## 2018-05-01 RX ORDER — SITAGLIPTIN 100 MG/1
100 TABLET, FILM COATED ORAL
Qty: 30 | Refills: 0 | Status: DISCONTINUED | COMMUNITY
Start: 2017-05-08 | End: 2018-05-01

## 2018-05-01 RX ORDER — AZITHROMYCIN 250 MG/1
250 TABLET, FILM COATED ORAL
Qty: 6 | Refills: 0 | Status: COMPLETED | COMMUNITY
Start: 2017-12-21

## 2018-05-01 RX ORDER — LANCETS 30 GAUGE
EACH MISCELLANEOUS
Qty: 100 | Refills: 0 | Status: ACTIVE | COMMUNITY
Start: 2018-03-01

## 2018-05-01 RX ORDER — BLOOD-GLUCOSE METER
W/DEVICE EACH MISCELLANEOUS
Qty: 1 | Refills: 0 | Status: ACTIVE | COMMUNITY
Start: 2018-03-01

## 2018-05-02 ENCOUNTER — APPOINTMENT (OUTPATIENT)
Dept: CARDIOLOGY | Facility: CLINIC | Age: 58
End: 2018-05-02
Payer: COMMERCIAL

## 2018-05-02 PROCEDURE — 93798 PHYS/QHP OP CAR RHAB W/ECG: CPT

## 2018-05-09 ENCOUNTER — APPOINTMENT (OUTPATIENT)
Dept: CARDIOLOGY | Facility: CLINIC | Age: 58
End: 2018-05-09
Payer: COMMERCIAL

## 2018-05-09 PROCEDURE — 93798 PHYS/QHP OP CAR RHAB W/ECG: CPT

## 2018-05-10 ENCOUNTER — APPOINTMENT (OUTPATIENT)
Dept: CARDIOLOGY | Facility: CLINIC | Age: 58
End: 2018-05-10
Payer: COMMERCIAL

## 2018-05-10 PROCEDURE — 93798 PHYS/QHP OP CAR RHAB W/ECG: CPT

## 2018-05-16 ENCOUNTER — APPOINTMENT (OUTPATIENT)
Dept: CARDIOLOGY | Facility: CLINIC | Age: 58
End: 2018-05-16
Payer: COMMERCIAL

## 2018-05-16 PROCEDURE — 93798 PHYS/QHP OP CAR RHAB W/ECG: CPT

## 2018-05-17 ENCOUNTER — APPOINTMENT (OUTPATIENT)
Dept: CARDIOLOGY | Facility: CLINIC | Age: 58
End: 2018-05-17
Payer: COMMERCIAL

## 2018-05-17 PROCEDURE — 93798 PHYS/QHP OP CAR RHAB W/ECG: CPT

## 2018-05-23 ENCOUNTER — APPOINTMENT (OUTPATIENT)
Dept: CARDIOLOGY | Facility: CLINIC | Age: 58
End: 2018-05-23
Payer: COMMERCIAL

## 2018-05-23 PROCEDURE — 93798 PHYS/QHP OP CAR RHAB W/ECG: CPT

## 2018-05-24 ENCOUNTER — APPOINTMENT (OUTPATIENT)
Dept: CARDIOLOGY | Facility: CLINIC | Age: 58
End: 2018-05-24
Payer: COMMERCIAL

## 2018-05-24 PROCEDURE — 93798 PHYS/QHP OP CAR RHAB W/ECG: CPT

## 2018-05-31 ENCOUNTER — APPOINTMENT (OUTPATIENT)
Dept: CARDIOLOGY | Facility: CLINIC | Age: 58
End: 2018-05-31
Payer: COMMERCIAL

## 2018-05-31 VITALS
HEIGHT: 77 IN | OXYGEN SATURATION: 98 % | SYSTOLIC BLOOD PRESSURE: 116 MMHG | BODY MASS INDEX: 29.52 KG/M2 | WEIGHT: 250 LBS | HEART RATE: 117 BPM | DIASTOLIC BLOOD PRESSURE: 75 MMHG

## 2018-05-31 VITALS — HEART RATE: 102 BPM

## 2018-05-31 PROCEDURE — 93798 PHYS/QHP OP CAR RHAB W/ECG: CPT

## 2018-05-31 PROCEDURE — 93289 INTERROG DEVICE EVAL HEART: CPT

## 2018-05-31 PROCEDURE — 99214 OFFICE O/P EST MOD 30 MIN: CPT | Mod: 25

## 2018-06-06 ENCOUNTER — APPOINTMENT (OUTPATIENT)
Dept: CARDIOLOGY | Facility: CLINIC | Age: 58
End: 2018-06-06
Payer: COMMERCIAL

## 2018-06-06 PROCEDURE — 93798 PHYS/QHP OP CAR RHAB W/ECG: CPT

## 2018-06-13 ENCOUNTER — APPOINTMENT (OUTPATIENT)
Dept: CARDIOLOGY | Facility: CLINIC | Age: 58
End: 2018-06-13
Payer: COMMERCIAL

## 2018-06-13 PROCEDURE — 93798 PHYS/QHP OP CAR RHAB W/ECG: CPT

## 2018-06-14 ENCOUNTER — APPOINTMENT (OUTPATIENT)
Dept: CARDIOLOGY | Facility: CLINIC | Age: 58
End: 2018-06-14
Payer: COMMERCIAL

## 2018-06-14 PROCEDURE — 93798 PHYS/QHP OP CAR RHAB W/ECG: CPT

## 2018-06-20 ENCOUNTER — APPOINTMENT (OUTPATIENT)
Dept: CARDIOLOGY | Facility: CLINIC | Age: 58
End: 2018-06-20
Payer: COMMERCIAL

## 2018-06-20 PROCEDURE — 93798 PHYS/QHP OP CAR RHAB W/ECG: CPT

## 2018-06-21 ENCOUNTER — APPOINTMENT (OUTPATIENT)
Dept: CARDIOLOGY | Facility: CLINIC | Age: 58
End: 2018-06-21
Payer: COMMERCIAL

## 2018-06-21 PROCEDURE — 93798 PHYS/QHP OP CAR RHAB W/ECG: CPT

## 2018-06-27 ENCOUNTER — APPOINTMENT (OUTPATIENT)
Dept: CARDIOLOGY | Facility: CLINIC | Age: 58
End: 2018-06-27
Payer: COMMERCIAL

## 2018-06-27 PROCEDURE — 93798 PHYS/QHP OP CAR RHAB W/ECG: CPT

## 2018-06-28 ENCOUNTER — APPOINTMENT (OUTPATIENT)
Dept: CARDIOLOGY | Facility: CLINIC | Age: 58
End: 2018-06-28

## 2018-07-02 ENCOUNTER — APPOINTMENT (OUTPATIENT)
Dept: CARDIOLOGY | Facility: CLINIC | Age: 58
End: 2018-07-02

## 2018-07-05 ENCOUNTER — APPOINTMENT (OUTPATIENT)
Dept: CARDIOLOGY | Facility: CLINIC | Age: 58
End: 2018-07-05
Payer: COMMERCIAL

## 2018-07-05 PROCEDURE — 93798 PHYS/QHP OP CAR RHAB W/ECG: CPT

## 2018-07-09 ENCOUNTER — APPOINTMENT (OUTPATIENT)
Dept: CARDIOLOGY | Facility: CLINIC | Age: 58
End: 2018-07-09

## 2018-07-11 ENCOUNTER — APPOINTMENT (OUTPATIENT)
Dept: CARDIOLOGY | Facility: CLINIC | Age: 58
End: 2018-07-11
Payer: COMMERCIAL

## 2018-07-11 PROCEDURE — 93798 PHYS/QHP OP CAR RHAB W/ECG: CPT

## 2018-07-12 ENCOUNTER — APPOINTMENT (OUTPATIENT)
Dept: CARDIOLOGY | Facility: CLINIC | Age: 58
End: 2018-07-12

## 2018-07-16 ENCOUNTER — APPOINTMENT (OUTPATIENT)
Dept: CARDIOLOGY | Facility: CLINIC | Age: 58
End: 2018-07-16

## 2018-07-17 PROBLEM — E11.9 TYPE 2 DIABETES MELLITUS WITHOUT COMPLICATIONS: Chronic | Status: ACTIVE | Noted: 2018-01-05

## 2018-07-17 PROBLEM — Z95.810 PRESENCE OF AUTOMATIC (IMPLANTABLE) CARDIAC DEFIBRILLATOR: Chronic | Status: ACTIVE | Noted: 2018-01-05

## 2018-07-17 PROBLEM — Z95.5 PRESENCE OF CORONARY ANGIOPLASTY IMPLANT AND GRAFT: Chronic | Status: ACTIVE | Noted: 2018-01-05

## 2018-07-18 ENCOUNTER — APPOINTMENT (OUTPATIENT)
Dept: CARDIOLOGY | Facility: CLINIC | Age: 58
End: 2018-07-18
Payer: COMMERCIAL

## 2018-07-18 PROCEDURE — 93798 PHYS/QHP OP CAR RHAB W/ECG: CPT

## 2018-07-19 ENCOUNTER — APPOINTMENT (OUTPATIENT)
Dept: CARDIOLOGY | Facility: CLINIC | Age: 58
End: 2018-07-19
Payer: COMMERCIAL

## 2018-07-19 PROCEDURE — 93798 PHYS/QHP OP CAR RHAB W/ECG: CPT

## 2018-07-23 ENCOUNTER — APPOINTMENT (OUTPATIENT)
Dept: CARDIOLOGY | Facility: CLINIC | Age: 58
End: 2018-07-23

## 2018-07-25 ENCOUNTER — APPOINTMENT (OUTPATIENT)
Dept: CARDIOLOGY | Facility: CLINIC | Age: 58
End: 2018-07-25
Payer: COMMERCIAL

## 2018-07-25 PROCEDURE — 93798 PHYS/QHP OP CAR RHAB W/ECG: CPT

## 2018-07-26 ENCOUNTER — APPOINTMENT (OUTPATIENT)
Dept: CARDIOLOGY | Facility: CLINIC | Age: 58
End: 2018-07-26
Payer: COMMERCIAL

## 2018-07-26 PROCEDURE — 93798 PHYS/QHP OP CAR RHAB W/ECG: CPT

## 2018-07-30 ENCOUNTER — APPOINTMENT (OUTPATIENT)
Dept: CARDIOLOGY | Facility: CLINIC | Age: 58
End: 2018-07-30

## 2018-08-01 ENCOUNTER — APPOINTMENT (OUTPATIENT)
Dept: CARDIOLOGY | Facility: CLINIC | Age: 58
End: 2018-08-01

## 2018-08-02 ENCOUNTER — APPOINTMENT (OUTPATIENT)
Dept: CARDIOLOGY | Facility: CLINIC | Age: 58
End: 2018-08-02

## 2018-08-06 ENCOUNTER — APPOINTMENT (OUTPATIENT)
Dept: CARDIOLOGY | Facility: CLINIC | Age: 58
End: 2018-08-06

## 2018-08-08 ENCOUNTER — APPOINTMENT (OUTPATIENT)
Dept: CARDIOLOGY | Facility: CLINIC | Age: 58
End: 2018-08-08

## 2018-09-13 ENCOUNTER — NON-APPOINTMENT (OUTPATIENT)
Age: 58
End: 2018-09-13

## 2018-09-13 ENCOUNTER — APPOINTMENT (OUTPATIENT)
Dept: CARDIOLOGY | Facility: CLINIC | Age: 58
End: 2018-09-13
Payer: COMMERCIAL

## 2018-09-13 VITALS
SYSTOLIC BLOOD PRESSURE: 122 MMHG | HEART RATE: 96 BPM | WEIGHT: 259 LBS | BODY MASS INDEX: 30.58 KG/M2 | DIASTOLIC BLOOD PRESSURE: 81 MMHG | OXYGEN SATURATION: 98 % | HEIGHT: 77 IN

## 2018-09-13 PROCEDURE — 93289 INTERROG DEVICE EVAL HEART: CPT

## 2018-09-13 PROCEDURE — 99214 OFFICE O/P EST MOD 30 MIN: CPT | Mod: 25

## 2018-09-13 PROCEDURE — 93000 ELECTROCARDIOGRAM COMPLETE: CPT | Mod: 59

## 2018-11-13 ENCOUNTER — NON-APPOINTMENT (OUTPATIENT)
Age: 58
End: 2018-11-13

## 2018-11-13 ENCOUNTER — APPOINTMENT (OUTPATIENT)
Dept: CARDIOLOGY | Facility: CLINIC | Age: 58
End: 2018-11-13
Payer: COMMERCIAL

## 2018-11-13 VITALS
BODY MASS INDEX: 31.29 KG/M2 | HEIGHT: 77 IN | SYSTOLIC BLOOD PRESSURE: 119 MMHG | OXYGEN SATURATION: 98 % | WEIGHT: 265 LBS | DIASTOLIC BLOOD PRESSURE: 79 MMHG | HEART RATE: 99 BPM

## 2018-11-13 PROCEDURE — 99214 OFFICE O/P EST MOD 30 MIN: CPT

## 2018-11-13 PROCEDURE — 93000 ELECTROCARDIOGRAM COMPLETE: CPT

## 2018-11-13 NOTE — DISCUSSION/SUMMARY
[FreeTextEntry1] : The patient was examined. His blood pressure was 119/76. His pulse was 98. His lungs were clear to auscultation. Cardiac exam was  negative for murmurs rubs or gallops.His EKG showed normal sinus rhythm with an anteroseptal wall myocardial infarction and nonspecific ST and T wave changes. No acute changes were seen.The patient will remain on his current medication. He'll return in 2 months, or earlier if needed.

## 2018-11-13 NOTE — REASON FOR VISIT
[FreeTextEntry1] : The patient comes in for followup of his coronary artery disease, hypertension, chronic systolic heart failure, diabetes, hypercholesterolemia and he gets occasional chest discomforts that sometimes are relieved with burping. He gets dyspnea on exertion. He had a nuclear stress test which showed scar but no ischemia. This is the 4th  office visit since the patient had an anterior wall STEMI  on February 5, 2018. He went to the [facility] emergency room. He underwent emergent cardiac catheterization and had a stent placed and a large ramus artery. He was discharged 3 days later. He has not had chest pain since the hospital. \par He currently has an upper respiratory infection. He is trying to quit smoking. He feels short of breath when he lies down to sleep. He wakes up frequently at night did not want anything for sleep apnea. When he wakes up he goes and eats. He had some chest tingling but felt completely different than his heart attack discomfort.

## 2018-11-13 NOTE — REVIEW OF SYSTEMS
[see HPI] : see HPI [Dyspnea on exertion] : dyspnea during exertion [Negative] : Heme/Lymph [Shortness Of Breath] : no shortness of breath [Chest Pain] : no chest pain [Palpitations] : no palpitations

## 2018-11-13 NOTE — PHYSICAL EXAM
[General Appearance - Well Developed] : well developed [Normal Appearance] : normal appearance [Well Groomed] : well groomed [No Deformities] : no deformities [General Appearance - In No Acute Distress] : no acute distress [Normal Conjunctiva] : the conjunctiva exhibited no abnormalities [Eyelids - No Xanthelasma] : the eyelids demonstrated no xanthelasmas [Normal Oral Mucosa] : normal oral mucosa [No Oral Pallor] : no oral pallor [No Oral Cyanosis] : no oral cyanosis [Normal Jugular Venous A Waves Present] : normal jugular venous A waves present [Normal Jugular Venous V Waves Present] : normal jugular venous V waves present [No Jugular Venous Bloom A Waves] : no jugular venous bloom A waves [Respiration, Rhythm And Depth] : normal respiratory rhythm and effort [Exaggerated Use Of Accessory Muscles For Inspiration] : no accessory muscle use [Auscultation Breath Sounds / Voice Sounds] : lungs were clear to auscultation bilaterally [Heart Rate And Rhythm] : heart rate and rhythm were normal [Heart Sounds] : normal S1 and S2 [Murmurs] : no murmurs present [Abdomen Soft] : soft [Abdomen Tenderness] : non-tender [Abdomen Mass (___ Cm)] : no abdominal mass palpated [Abnormal Walk] : normal gait [Gait - Sufficient For Exercise Testing] : the gait was sufficient for exercise testing [Nail Clubbing] : no clubbing of the fingernails [Cyanosis, Localized] : no localized cyanosis [Petechial Hemorrhages (___cm)] : no petechial hemorrhages [Skin Color & Pigmentation] : normal skin color and pigmentation [] : no rash [No Venous Stasis] : no venous stasis [Skin Lesions] : no skin lesions [No Skin Ulcers] : no skin ulcer [No Xanthoma] : no  xanthoma was observed [Oriented To Time, Place, And Person] : oriented to person, place, and time [Affect] : the affect was normal [Mood] : the mood was normal [No Anxiety] : not feeling anxious [FreeTextEntry1] : obese

## 2018-12-23 ENCOUNTER — INPATIENT (INPATIENT)
Facility: HOSPITAL | Age: 58
LOS: 2 days | Discharge: HOME | DRG: 872 | End: 2018-12-26
Attending: HOSPITALIST | Admitting: HOSPITALIST
Payer: MEDICARE

## 2018-12-23 VITALS
RESPIRATION RATE: 18 BRPM | SYSTOLIC BLOOD PRESSURE: 124 MMHG | WEIGHT: 259.93 LBS | HEIGHT: 77 IN | DIASTOLIC BLOOD PRESSURE: 64 MMHG | OXYGEN SATURATION: 98 % | TEMPERATURE: 98 F | HEART RATE: 113 BPM

## 2018-12-23 DIAGNOSIS — B34.9 VIRAL INFECTION, UNSPECIFIED: ICD-10-CM

## 2018-12-23 LAB
ALBUMIN SERPL ELPH-MCNC: 4.3 G/DL — SIGNIFICANT CHANGE UP (ref 3.3–5)
ALP SERPL-CCNC: 79 U/L — SIGNIFICANT CHANGE UP (ref 40–120)
ALT FLD-CCNC: 68 U/L — HIGH (ref 10–45)
ANION GAP SERPL CALC-SCNC: 18 MMOL/L — HIGH (ref 5–17)
AST SERPL-CCNC: 47 U/L — HIGH (ref 10–40)
BASOPHILS # BLD AUTO: 0.1 K/UL — SIGNIFICANT CHANGE UP (ref 0–0.2)
BASOPHILS NFR BLD AUTO: 0.8 % — SIGNIFICANT CHANGE UP (ref 0–2)
BILIRUB SERPL-MCNC: 0.2 MG/DL — SIGNIFICANT CHANGE UP (ref 0.2–1.2)
BUN SERPL-MCNC: 21 MG/DL — SIGNIFICANT CHANGE UP (ref 7–23)
CALCIUM SERPL-MCNC: 9.2 MG/DL — SIGNIFICANT CHANGE UP (ref 8.4–10.5)
CHLORIDE SERPL-SCNC: 100 MMOL/L — SIGNIFICANT CHANGE UP (ref 96–108)
CO2 SERPL-SCNC: 19 MMOL/L — LOW (ref 22–31)
CREAT SERPL-MCNC: 1.4 MG/DL — HIGH (ref 0.5–1.3)
EOSINOPHIL # BLD AUTO: 0.2 K/UL — SIGNIFICANT CHANGE UP (ref 0–0.5)
EOSINOPHIL NFR BLD AUTO: 2.2 % — SIGNIFICANT CHANGE UP (ref 0–6)
FLUAV H3 RNA SPEC QL NAA+PROBE: DETECTED
GLUCOSE SERPL-MCNC: 102 MG/DL — HIGH (ref 70–99)
HCT VFR BLD CALC: 43.4 % — SIGNIFICANT CHANGE UP (ref 39–50)
HGB BLD-MCNC: 15.4 G/DL — SIGNIFICANT CHANGE UP (ref 13–17)
LYMPHOCYTES # BLD AUTO: 0.6 K/UL — LOW (ref 1–3.3)
LYMPHOCYTES # BLD AUTO: 5.7 % — LOW (ref 13–44)
MCHC RBC-ENTMCNC: 28 PG — SIGNIFICANT CHANGE UP (ref 27–34)
MCHC RBC-ENTMCNC: 35.5 GM/DL — SIGNIFICANT CHANGE UP (ref 32–36)
MCV RBC AUTO: 79 FL — LOW (ref 80–100)
MONOCYTES # BLD AUTO: 0.9 K/UL — SIGNIFICANT CHANGE UP (ref 0–0.9)
MONOCYTES NFR BLD AUTO: 8.8 % — SIGNIFICANT CHANGE UP (ref 2–14)
NEUTROPHILS # BLD AUTO: 8.8 K/UL — HIGH (ref 1.8–7.4)
NEUTROPHILS NFR BLD AUTO: 82.5 % — HIGH (ref 43–77)
NT-PROBNP SERPL-SCNC: 242 PG/ML — SIGNIFICANT CHANGE UP (ref 0–300)
PLATELET # BLD AUTO: 174 K/UL — SIGNIFICANT CHANGE UP (ref 150–400)
POTASSIUM SERPL-MCNC: 4.1 MMOL/L — SIGNIFICANT CHANGE UP (ref 3.5–5.3)
POTASSIUM SERPL-SCNC: 4.1 MMOL/L — SIGNIFICANT CHANGE UP (ref 3.5–5.3)
PROT SERPL-MCNC: 7.9 G/DL — SIGNIFICANT CHANGE UP (ref 6–8.3)
RAPID RVP RESULT: DETECTED
RBC # BLD: 5.5 M/UL — SIGNIFICANT CHANGE UP (ref 4.2–5.8)
RBC # FLD: 14.1 % — SIGNIFICANT CHANGE UP (ref 10.3–14.5)
SODIUM SERPL-SCNC: 137 MMOL/L — SIGNIFICANT CHANGE UP (ref 135–145)
TROPONIN T, HIGH SENSITIVITY RESULT: 33 NG/L — SIGNIFICANT CHANGE UP (ref 0–51)
WBC # BLD: 10.6 K/UL — HIGH (ref 3.8–10.5)
WBC # FLD AUTO: 10.6 K/UL — HIGH (ref 3.8–10.5)

## 2018-12-23 PROCEDURE — 99291 CRITICAL CARE FIRST HOUR: CPT

## 2018-12-23 PROCEDURE — 93010 ELECTROCARDIOGRAM REPORT: CPT

## 2018-12-23 RX ORDER — IPRATROPIUM/ALBUTEROL SULFATE 18-103MCG
3 AEROSOL WITH ADAPTER (GRAM) INHALATION ONCE
Qty: 0 | Refills: 0 | Status: COMPLETED | OUTPATIENT
Start: 2018-12-23 | End: 2018-12-23

## 2018-12-23 RX ORDER — DEXAMETHASONE 0.5 MG/5ML
10 ELIXIR ORAL ONCE
Qty: 0 | Refills: 0 | Status: COMPLETED | OUTPATIENT
Start: 2018-12-23 | End: 2018-12-23

## 2018-12-23 RX ADMIN — Medication 3 MILLILITER(S): at 21:54

## 2018-12-23 RX ADMIN — Medication 10 MILLIGRAM(S): at 22:49

## 2018-12-23 RX ADMIN — Medication 3 MILLILITER(S): at 22:49

## 2018-12-23 NOTE — ED PROVIDER NOTE - OBJECTIVE STATEMENT
58M hx of HTN DM HLD CAD s/p stent smoker presents with a cc of SOB worsening over x24 hours cough sick contacts at home, no prior asthma COPD, was seen at urgent care given anti cough medications not feeling better prompting visit to ED. CP only after repeated bouts of coughing, no rash, no back pain. No prior dvt/pe. Productive cough. Denies n/v/f/c/ Denies headache, syncope, lightheadedness, dizziness. Denies chest palpitations, abdominal pain. Denies dysuria, hematuria, hematochezia, BRBPR, tarry stools, diarrhea, constipation. Had cxr outpt told negative.

## 2018-12-23 NOTE — ED PROVIDER NOTE - CARE PLAN
Principal Discharge DX:	Viral illness Principal Discharge DX:	Respiratory distress, acute  Secondary Diagnosis:	Influenza A  Secondary Diagnosis:	Sepsis, due to unspecified organism

## 2018-12-23 NOTE — ED PROVIDER NOTE - ATTENDING CONTRIBUTION TO CARE
I performed a history and physical exam of the patient and discussed their management with the resident. I reviewed the resident's note and agree with the documented findings and plan of care, except if noted below. My medical decision making and observations can be found be found below.    57 yo male hx of DM, HTN, HLD, CAD presents with shortness of breath, runny nose, nasal congestion x 2 days. +sick contacts. Diffuse wheezing on exam. Likely viral induce reactive airway disease. Will eval for possible new onset CHF as well. Labs, EKG, trop, bnp, duoneb, rvp, reevaluate.

## 2018-12-23 NOTE — ED ADULT NURSE NOTE - OBJECTIVE STATEMENT
pt c/o "cough and feeling congested for the past x2 days. I started to feel more SOB and today I went to urgent care and they told me to come to the ED." pt endorses sick contacts at home (wife had bronchitis) as well as nonproductive cough. pt denies any lightheadedness, dizziness, chest pain, abd. pain, n/v/d, numbness/tingling at present.

## 2018-12-23 NOTE — ED PROVIDER NOTE - MEDICAL DECISION MAKING DETAILS
see attending attestation see attending attestation    Kayla PGY2: 58M hx of HTN DM HLD CAD s/p stent smoker presents with a cc of SOB worsening over x24 hours cough sick contacts at home, no prior asthma COPD, was seen at urgent care given anti cough medications not feeling better prompting visit to ED. exam vss non toxic appearing reduced breath sounds c/f reactive airway 2/2 viral illness vs pna vs atypical cp will get labs cardiacs duonebs reasssess

## 2018-12-23 NOTE — ED PROVIDER NOTE - PHYSICAL EXAMINATION
GEN APPEARANCE: WDWN, alert and cooperative, non-toxic appearing and in NAD  HEAD: Atraumatic, normocephalic   EYES: PERRLa, EOMI, vision grossly intact.   EARS: Gross hearing intact.   NOSE: No nasal discharge, no external evidence of epistaxis.   NECK: Supple  CV: RRR, S1S2, no c/r/m/g. No cyanosis or pallor. Extremities warm, well perfused. Cap refill <2 seconds. No bruits.   LUNGS: diminished breath sounds throughout. faint b/l wheezing. No rales. No rhonchi.   ABDOMEN: Soft, NTND. No guarding or rebound. No masses.   MSK: Spine appears normal, no spine point tenderness. No CVA ttp. No joint erythema or tenderness. Normal muscular development. Pelvis stable.  EXTREMITIES: No peripheral edema. No obvious joint or bony deformity.  NEURO: Alert, follows commands. Weight bearing normal. Speech normal. Sensation and motor normal x4 extremities.   SKIN: Normal color for race, warm, dry and intact. No evidence of rash.  PSYCH: Normal mood and affect.

## 2018-12-24 DIAGNOSIS — I25.10 ATHEROSCLEROTIC HEART DISEASE OF NATIVE CORONARY ARTERY WITHOUT ANGINA PECTORIS: ICD-10-CM

## 2018-12-24 DIAGNOSIS — J10.1 INFLUENZA DUE TO OTHER IDENTIFIED INFLUENZA VIRUS WITH OTHER RESPIRATORY MANIFESTATIONS: ICD-10-CM

## 2018-12-24 DIAGNOSIS — A41.9 SEPSIS, UNSPECIFIED ORGANISM: ICD-10-CM

## 2018-12-24 DIAGNOSIS — Z29.9 ENCOUNTER FOR PROPHYLACTIC MEASURES, UNSPECIFIED: ICD-10-CM

## 2018-12-24 DIAGNOSIS — I10 ESSENTIAL (PRIMARY) HYPERTENSION: ICD-10-CM

## 2018-12-24 DIAGNOSIS — I50.22 CHRONIC SYSTOLIC (CONGESTIVE) HEART FAILURE: ICD-10-CM

## 2018-12-24 DIAGNOSIS — E11.22 TYPE 2 DIABETES MELLITUS WITH DIABETIC CHRONIC KIDNEY DISEASE: ICD-10-CM

## 2018-12-24 LAB
ANION GAP SERPL CALC-SCNC: 18 MMOL/L — HIGH (ref 5–17)
BUN SERPL-MCNC: 20 MG/DL — SIGNIFICANT CHANGE UP (ref 7–23)
CALCIUM SERPL-MCNC: 9.2 MG/DL — SIGNIFICANT CHANGE UP (ref 8.4–10.5)
CHLORIDE SERPL-SCNC: 95 MMOL/L — LOW (ref 96–108)
CO2 SERPL-SCNC: 20 MMOL/L — LOW (ref 22–31)
CREAT SERPL-MCNC: 1.53 MG/DL — HIGH (ref 0.5–1.3)
GLUCOSE BLDC GLUCOMTR-MCNC: 141 MG/DL — HIGH (ref 70–99)
GLUCOSE BLDC GLUCOMTR-MCNC: 160 MG/DL — HIGH (ref 70–99)
GLUCOSE SERPL-MCNC: 312 MG/DL — HIGH (ref 70–99)
HBA1C BLD-MCNC: 9.9 % — HIGH (ref 4–5.6)
HCT VFR BLD CALC: 44.3 % — SIGNIFICANT CHANGE UP (ref 39–50)
HGB BLD-MCNC: 15.4 G/DL — SIGNIFICANT CHANGE UP (ref 13–17)
MAGNESIUM SERPL-MCNC: 1.9 MG/DL — SIGNIFICANT CHANGE UP (ref 1.6–2.6)
MCHC RBC-ENTMCNC: 28 PG — SIGNIFICANT CHANGE UP (ref 27–34)
MCHC RBC-ENTMCNC: 34.9 GM/DL — SIGNIFICANT CHANGE UP (ref 32–36)
MCV RBC AUTO: 80.3 FL — SIGNIFICANT CHANGE UP (ref 80–100)
PHOSPHATE SERPL-MCNC: 3.3 MG/DL — SIGNIFICANT CHANGE UP (ref 2.5–4.5)
PLATELET # BLD AUTO: 151 K/UL — SIGNIFICANT CHANGE UP (ref 150–400)
POTASSIUM SERPL-MCNC: 4.5 MMOL/L — SIGNIFICANT CHANGE UP (ref 3.5–5.3)
POTASSIUM SERPL-SCNC: 4.5 MMOL/L — SIGNIFICANT CHANGE UP (ref 3.5–5.3)
RBC # BLD: 5.52 M/UL — SIGNIFICANT CHANGE UP (ref 4.2–5.8)
RBC # FLD: 14 % — SIGNIFICANT CHANGE UP (ref 10.3–14.5)
SODIUM SERPL-SCNC: 133 MMOL/L — LOW (ref 135–145)
TROPONIN T, HIGH SENSITIVITY RESULT: 29 NG/L — SIGNIFICANT CHANGE UP (ref 0–51)
WBC # BLD: 8 K/UL — SIGNIFICANT CHANGE UP (ref 3.8–10.5)
WBC # FLD AUTO: 8 K/UL — SIGNIFICANT CHANGE UP (ref 3.8–10.5)

## 2018-12-24 PROCEDURE — 99223 1ST HOSP IP/OBS HIGH 75: CPT

## 2018-12-24 PROCEDURE — 12345: CPT | Mod: NC

## 2018-12-24 RX ORDER — SODIUM CHLORIDE 9 MG/ML
1000 INJECTION INTRAMUSCULAR; INTRAVENOUS; SUBCUTANEOUS
Qty: 0 | Refills: 0 | Status: DISCONTINUED | OUTPATIENT
Start: 2018-12-24 | End: 2018-12-24

## 2018-12-24 RX ORDER — DEXTROSE 50 % IN WATER 50 %
12.5 SYRINGE (ML) INTRAVENOUS ONCE
Qty: 0 | Refills: 0 | Status: DISCONTINUED | OUTPATIENT
Start: 2018-12-24 | End: 2018-12-26

## 2018-12-24 RX ORDER — DEXTROSE 50 % IN WATER 50 %
25 SYRINGE (ML) INTRAVENOUS ONCE
Qty: 0 | Refills: 0 | Status: DISCONTINUED | OUTPATIENT
Start: 2018-12-24 | End: 2018-12-26

## 2018-12-24 RX ORDER — TAMSULOSIN HYDROCHLORIDE 0.4 MG/1
0 CAPSULE ORAL
Qty: 0 | Refills: 0 | COMMUNITY

## 2018-12-24 RX ORDER — ACETAMINOPHEN 500 MG
650 TABLET ORAL EVERY 6 HOURS
Qty: 0 | Refills: 0 | Status: DISCONTINUED | OUTPATIENT
Start: 2018-12-24 | End: 2018-12-26

## 2018-12-24 RX ORDER — TAMSULOSIN HYDROCHLORIDE 0.4 MG/1
0.4 CAPSULE ORAL AT BEDTIME
Qty: 0 | Refills: 0 | Status: DISCONTINUED | OUTPATIENT
Start: 2018-12-24 | End: 2018-12-26

## 2018-12-24 RX ORDER — LISINOPRIL 2.5 MG/1
5 TABLET ORAL DAILY
Qty: 0 | Refills: 0 | Status: DISCONTINUED | OUTPATIENT
Start: 2018-12-24 | End: 2018-12-26

## 2018-12-24 RX ORDER — IPRATROPIUM/ALBUTEROL SULFATE 18-103MCG
3 AEROSOL WITH ADAPTER (GRAM) INHALATION EVERY 6 HOURS
Qty: 0 | Refills: 0 | Status: DISCONTINUED | OUTPATIENT
Start: 2018-12-24 | End: 2018-12-26

## 2018-12-24 RX ORDER — SODIUM CHLORIDE 9 MG/ML
1000 INJECTION, SOLUTION INTRAVENOUS
Qty: 0 | Refills: 0 | Status: DISCONTINUED | OUTPATIENT
Start: 2018-12-24 | End: 2018-12-26

## 2018-12-24 RX ORDER — TICAGRELOR 90 MG/1
90 TABLET ORAL
Qty: 0 | Refills: 0 | Status: DISCONTINUED | OUTPATIENT
Start: 2018-12-24 | End: 2018-12-26

## 2018-12-24 RX ORDER — GLUCAGON INJECTION, SOLUTION 0.5 MG/.1ML
1 INJECTION, SOLUTION SUBCUTANEOUS ONCE
Qty: 0 | Refills: 0 | Status: DISCONTINUED | OUTPATIENT
Start: 2018-12-24 | End: 2018-12-26

## 2018-12-24 RX ORDER — INSULIN GLARGINE 100 [IU]/ML
50 INJECTION, SOLUTION SUBCUTANEOUS AT BEDTIME
Qty: 0 | Refills: 0 | Status: DISCONTINUED | OUTPATIENT
Start: 2018-12-24 | End: 2018-12-26

## 2018-12-24 RX ORDER — INSULIN LISPRO 100/ML
VIAL (ML) SUBCUTANEOUS AT BEDTIME
Qty: 0 | Refills: 0 | Status: DISCONTINUED | OUTPATIENT
Start: 2018-12-24 | End: 2018-12-26

## 2018-12-24 RX ORDER — ASPIRIN/CALCIUM CARB/MAGNESIUM 324 MG
81 TABLET ORAL DAILY
Qty: 0 | Refills: 0 | Status: DISCONTINUED | OUTPATIENT
Start: 2018-12-24 | End: 2018-12-26

## 2018-12-24 RX ORDER — SPIRONOLACTONE 25 MG/1
25 TABLET, FILM COATED ORAL DAILY
Qty: 0 | Refills: 0 | Status: DISCONTINUED | OUTPATIENT
Start: 2018-12-24 | End: 2018-12-26

## 2018-12-24 RX ORDER — INSULIN LISPRO 100/ML
12 VIAL (ML) SUBCUTANEOUS
Qty: 0 | Refills: 0 | Status: DISCONTINUED | OUTPATIENT
Start: 2018-12-24 | End: 2018-12-26

## 2018-12-24 RX ORDER — INSULIN LISPRO 100/ML
VIAL (ML) SUBCUTANEOUS
Qty: 0 | Refills: 0 | Status: DISCONTINUED | OUTPATIENT
Start: 2018-12-24 | End: 2018-12-26

## 2018-12-24 RX ORDER — METOPROLOL TARTRATE 50 MG
50 TABLET ORAL DAILY
Qty: 0 | Refills: 0 | Status: DISCONTINUED | OUTPATIENT
Start: 2018-12-24 | End: 2018-12-26

## 2018-12-24 RX ORDER — DEXTROSE 50 % IN WATER 50 %
15 SYRINGE (ML) INTRAVENOUS ONCE
Qty: 0 | Refills: 0 | Status: DISCONTINUED | OUTPATIENT
Start: 2018-12-24 | End: 2018-12-26

## 2018-12-24 RX ORDER — ACETAMINOPHEN 500 MG
975 TABLET ORAL ONCE
Qty: 0 | Refills: 0 | Status: COMPLETED | OUTPATIENT
Start: 2018-12-24 | End: 2018-12-24

## 2018-12-24 RX ADMIN — Medication 650 MILLIGRAM(S): at 11:08

## 2018-12-24 RX ADMIN — Medication 975 MILLIGRAM(S): at 01:04

## 2018-12-24 RX ADMIN — Medication 2: at 08:57

## 2018-12-24 RX ADMIN — Medication 975 MILLIGRAM(S): at 00:23

## 2018-12-24 RX ADMIN — Medication 650 MILLIGRAM(S): at 10:38

## 2018-12-24 RX ADMIN — Medication 12 UNIT(S): at 12:11

## 2018-12-24 RX ADMIN — Medication 3 MILLILITER(S): at 17:16

## 2018-12-24 RX ADMIN — Medication 75 MILLIGRAM(S): at 05:57

## 2018-12-24 RX ADMIN — Medication 200 MILLIGRAM(S): at 10:42

## 2018-12-24 RX ADMIN — Medication 2: at 12:11

## 2018-12-24 RX ADMIN — SPIRONOLACTONE 25 MILLIGRAM(S): 25 TABLET, FILM COATED ORAL at 06:31

## 2018-12-24 RX ADMIN — Medication 50 MILLIGRAM(S): at 17:17

## 2018-12-24 RX ADMIN — Medication 75 MILLIGRAM(S): at 17:18

## 2018-12-24 RX ADMIN — TAMSULOSIN HYDROCHLORIDE 0.4 MILLIGRAM(S): 0.4 CAPSULE ORAL at 21:33

## 2018-12-24 RX ADMIN — LISINOPRIL 5 MILLIGRAM(S): 2.5 TABLET ORAL at 06:31

## 2018-12-24 RX ADMIN — Medication 200 MILLIGRAM(S): at 17:17

## 2018-12-24 RX ADMIN — Medication 12 UNIT(S): at 17:17

## 2018-12-24 RX ADMIN — INSULIN GLARGINE 50 UNIT(S): 100 INJECTION, SOLUTION SUBCUTANEOUS at 21:28

## 2018-12-24 RX ADMIN — Medication 12 UNIT(S): at 08:58

## 2018-12-24 RX ADMIN — Medication 81 MILLIGRAM(S): at 11:39

## 2018-12-24 RX ADMIN — Medication 3 MILLILITER(S): at 06:00

## 2018-12-24 RX ADMIN — TICAGRELOR 90 MILLIGRAM(S): 90 TABLET ORAL at 17:18

## 2018-12-24 NOTE — H&P ADULT - PROBLEM SELECTOR PLAN 3
pt with CAD/MI with HFrEF, last TTE 2/2018 with EF 20-25%; currently euvolemic   c/w metoprolol succinate 50mg, lisinopril 5mg and aldactone for now   monitor volume status closely pt with CAD/MI with HFrEF, last TTE 2/2018 with EF 20-25%; currently euvolemic   c/w metoprolol succinate 50mg, lisinopril 5mg and aldactone for now   monitor volume status closely  check TTE inpt vs outpt

## 2018-12-24 NOTE — H&P ADULT - PROBLEM SELECTOR PLAN 4
check A1c   c/w home dose insulin - lantus 50u qhs, humalog 12u TID with meals  ISS and monitor FS ac and hs   hold oral medications

## 2018-12-24 NOTE — H&P ADULT - PROBLEM SELECTOR PLAN 6
CAD/MI - no CP currently, delta trop (-)  c/w ASA and Brilinta   pt taken off lipitor as outpt for myalgias

## 2018-12-24 NOTE — PROGRESS NOTE ADULT - SUBJECTIVE AND OBJECTIVE BOX
Sajan Castle MD  ([facility] Hospitalist)  Pager [phone]  (During off hours please page [phone])      Patient is a 58y old  Male who presents with a chief complaint of flu (24 Dec 2018 04:50)      SUBJECTIVE / OVERNIGHT EVENTS: No events overnight. The patient continues to have generalized symptoms associated with influenza.    MEDICATIONS  (STANDING):  aspirin  chewable 81 milliGRAM(s) Oral daily  insulin glargine Injectable (LANTUS) 50 Unit(s) SubCutaneous at bedtime  insulin lispro (HumaLOG) corrective regimen sliding scale   SubCutaneous three times a day before meals  insulin lispro (HumaLOG) corrective regimen sliding scale   SubCutaneous at bedtime  insulin lispro Injectable (HumaLOG) 12 Unit(s) SubCutaneous three times a day before meals  lisinopril 5 milliGRAM(s) Oral daily  metoprolol succinate ER 50 milliGRAM(s) Oral daily  oseltamivir 75 milliGRAM(s) Oral two times a day  sodium chloride 0.9%. 1000 milliLiter(s) (100 mL/Hr) IV Continuous <Continuous>  spironolactone 25 milliGRAM(s) Oral daily  tamsulosin 0.4 milliGRAM(s) Oral at bedtime  ticagrelor 90 milliGRAM(s) Oral two times a day    MEDICATIONS  (PRN):  acetaminophen   Tablet .. 650 milliGRAM(s) Oral every 6 hours PRN Temp greater or equal to 38C (100.4F), Mild Pain (1 - 3)  ALBUTerol/ipratropium for Nebulization 3 milliLiter(s) Nebulizer every 6 hours PRN Shortness of Breath and/or Wheezing  benzonatate 100 milliGRAM(s) Oral three times a day PRN Cough  dextrose 40% Gel 15 Gram(s) Oral once PRN Blood Glucose LESS THAN 70 milliGRAM(s)/deciliter  glucagon  Injectable 1 milliGRAM(s) IntraMuscular once PRN Glucose LESS THAN 70 milligrams/deciliter  guaiFENesin    Syrup 200 milliGRAM(s) Oral every 6 hours PRN Cough      Vital Signs Last 24 Hrs  T(C): 37.1 (24 Dec 2018 08:54), Max: 38.6 (24 Dec 2018 00:16)  T(F): 98.8 (24 Dec 2018 08:54), Max: 101.5 (24 Dec 2018 00:16)  HR: 100 (24 Dec 2018 08:54) (100 - 133)  BP: 132/76 (24 Dec 2018 08:54) (108/60 - 144/80)  BP(mean): --  RR: 18 (24 Dec 2018 08:54) (18 - 22)  SpO2: 96% (24 Dec 2018 08:54) (95% - 98%)    CAPILLARY BLOOD GLUCOSE  POCT Blood Glucose.: 247 mg/dL (24 Dec 2018 11:56)  POCT Blood Glucose.: 231 mg/dL (24 Dec 2018 08:47)      PHYSICAL EXAM:  GENERAL: NAD, well-developed  HEAD:  Atraumatic, normocephalic  EYES: EOMI, conjunctiva and sclera clear  NECK: Supple, no JVD  CHEST/LUNG: CTA b/l, no wheezing or rales  HEART: S1, S2, tachycardic, no murmurs  ABDOMEN: Obese, Soft, nontender, nondistended; bowel sounds present  EXTREMITIES: no clubbing, cyanosis, or edema  PSYCH: calm affect, not anxious  NEUROLOGY: non-focal, AAOx3  SKIN: No rashes or lesions  MUSCULOSKELETAL: no back pain, moving all extremities      LABS:                        15.4   8.0   )-----------( 151      ( 24 Dec 2018 06:39 )             44.3     12-24    133<L>  |  95<L>  |  20  ----------------------------<  312<H>  4.5   |  20<L>  |  1.53<H>    Ca    9.2      24 Dec 2018 06:39  Phos  3.3     12-24  Mg     1.9     12-24    TPro  7.9  /  Alb  4.3  /  TBili  0.2  /  DBili  x   /  AST  47<H>  /  ALT  68<H>  /  AlkPhos  79  12-23      RADIOLOGY & ADDITIONAL TESTS:    Imaging Personally Reviewed:  CXR

## 2018-12-24 NOTE — H&P ADULT - NSHPREVIEWOFSYSTEMS_GEN_ALL_CORE
REVIEW OF SYSTEMS:    CONSTITUTIONAL: +fatigue, no fevers, +chills  EYES/ENT: No visual changes, +sore throat   NECK: No pain or stiffness  RESPIRATORY: +cough, no wheezing, no hemoptysis, +shortness of breath  CARDIOVASCULAR: No chest pain or palpitations  GASTROINTESTINAL: no nausea, +vomiting, no abdominal pain, no BRBPR  GENITOURINARY: no polyuria, no dysuria, no hematuria   NEUROLOGICAL: no numbness, +headaches, no confusion   MUSCULOSKELETAL: no back pain, +generalized myalgias    SKIN: No itching, burning, rashes, or lesions   PSYCH: no anxiety, depression  HEME: no gum bleeding, no bruising

## 2018-12-24 NOTE — H&P ADULT - HISTORY OF PRESENT ILLNESS
58M with PMH of T2DM, HTN, CAD/MI s/p stents (most recent 2/2018), HFrEF p/w cough and SOB x 2d. Pt states he started noticing SOB and cough on Friday, with symptoms progressing significantly over the next 24hrs. Endorses SOB with exertion, which is not normal for him, painful coughing fits, productive of sputum, +pain with deep inspiration and coughing, +headache and myalgias, +shaking chills, + +one episode of post-tussive emesis, NBNB; denies fevers or wheezing. Pt was feeling so fatigued and ill, he went to urgent care on Saturday night and given only cough meds. Symptoms had not improved at all over Sunday, prompting ED visit. Pt endorses +sick contacts (wife and daughter with similar symptoms), no recent travel. Did get flu shot this year. Denies CP, no palpitations, no abdominal pain, no diarrhea, no  symptoms, no lightheadedness. Pt is a current smoker with 40pack years, has never been diagnosed with COPD/asthma.       Family hx of COPD in sister.

## 2018-12-24 NOTE — H&P ADULT - NSHPPHYSICALEXAM_GEN_ALL_CORE
Vital Signs Last 24 Hrs  T(C): 37.7 (24 Dec 2018 02:00), Max: 38.6 (24 Dec 2018 00:16)  T(F): 99.8 (24 Dec 2018 02:00), Max: 101.5 (24 Dec 2018 00:16)  HR: 108 (24 Dec 2018 03:24) (108 - 133)  BP: 144/80 (24 Dec 2018 03:24) (123/78 - 144/80)  BP(mean): --  RR: 20 (24 Dec 2018 03:24) (18 - 22)  SpO2: 97% (24 Dec 2018 03:24) (95% - 98%)    PHYSICAL EXAM:  GENERAL: NAD, well-developed  HEAD:  Atraumatic, normocephalic  EYES: EOMI, PERRLA, conjunctiva and sclera clear  NECK: Supple, no JVD  CHEST/LUNG: diminished breath sounds, but no wheezing or rales  HEART: S1, S2, tachycardic, no murmurs  ABDOMEN: Soft, nontender, nondistended; bowel sounds present  EXTREMITIES: no clubbing, cyanosis, or edema  PSYCH: calm affect, not anxious  NEUROLOGY: non-focal, AAOx3  SKIN: No rashes or lesions  MUSCULOSKELETAL: no back pain, moving all extremities

## 2018-12-24 NOTE — H&P ADULT - ASSESSMENT
58M with PMH of T2DM, HTN, CAD/MI s/p stents (most recent 2/2018), HFrEF p/w cough and SOB x 2d, found to be septic 2/2 FluA.

## 2018-12-24 NOTE — H&P ADULT - PROBLEM SELECTOR PLAN 2
febrile 101.5 and tachycardic 130s with source as flu  monitor vitals closely - lb HR (which may be worsened by multiple round of nebs)   maintenance IVF (gentle given HF)   symptoms control as above   tamiflu

## 2018-12-24 NOTE — H&P ADULT - NSHPLABSRESULTS_GEN_ALL_CORE
Labs, imaging and EKG personally reviewed and interpreted by me - mild leukocytosis 10.6, Cr 1.40 (baseline 1.2-1.3), AST/ALT 47/68, delta trop (-), , RVP(+) for FluA. CXR with no focal consolidation c/f PNA.                            15.4   10.6  )-----------( 174      ( 23 Dec 2018 22:10 )             43.4     12-23    137  |  100  |  21  ----------------------------<  102<H>  4.1   |  19<L>  |  1.40<H>    Ca    9.2      23 Dec 2018 22:10    TPro  7.9  /  Alb  4.3  /  TBili  0.2  /  DBili  x   /  AST  47<H>  /  ALT  68<H>  /  AlkPhos  79  12-23    Troponin T, High Sensitivity Result: 33 --> 29  Serum Pro-Brain Natriuretic Peptide: 242 pg/mL (12.23.18 @ 22:10)    Rapid Respiratory Viral Panel (12.23.18 @ 22:09)    Rapid RVP Result: Detected    Influenza AH1 2009 (RapRVP): Detected    < from: Xray Chest 1 View- PORTABLE-Urgent (02.05.18 @ 18:05) >    The mediastinal cardiac silhouette is unremarkable. Pacemaker.  The lungs are clear.   No acute osseous finding.     IMPRESSION:  No acute process. Labs, imaging and EKG personally reviewed and interpreted by me - mild leukocytosis 10.6, Cr 1.40 (baseline 1.2-1.3), AST/ALT 47/68, delta trop (-), , RVP(+) for FluA. CXR with no focal consolidation c/f PNA. EKG with sinus tachycardia 110s, no acute ischemic changes                           15.4   10.6  )-----------( 174      ( 23 Dec 2018 22:10 )             43.4     12-23    137  |  100  |  21  ----------------------------<  102<H>  4.1   |  19<L>  |  1.40<H>    Ca    9.2      23 Dec 2018 22:10    TPro  7.9  /  Alb  4.3  /  TBili  0.2  /  DBili  x   /  AST  47<H>  /  ALT  68<H>  /  AlkPhos  79  12-23    Troponin T, High Sensitivity Result: 33 --> 29  Serum Pro-Brain Natriuretic Peptide: 242 pg/mL (12.23.18 @ 22:10)    Rapid Respiratory Viral Panel (12.23.18 @ 22:09)    Rapid RVP Result: Detected    Influenza AH1 2009 (RapRVP): Detected    < from: Xray Chest 1 View- PORTABLE-Urgent (02.05.18 @ 18:05) >    The mediastinal cardiac silhouette is unremarkable. Pacemaker.  The lungs are clear.   No acute osseous finding.     IMPRESSION:  No acute process.

## 2018-12-24 NOTE — H&P ADULT - PROBLEM SELECTOR PLAN 1
pt with cough, SOB, myalgias, fevers/chills, +sick contacts - +FluA  CXR (-) for PNA   will tx with tamiflu   symptom management - tylenol for fevers, pain; antitussives   maintenance IVF

## 2018-12-24 NOTE — H&P ADULT - FAMILY HISTORY
Sibling  Still living? Unknown  Family history of COPD (chronic obstructive pulmonary disease), Age at diagnosis: Age Unknown

## 2018-12-24 NOTE — ED ADULT NURSE REASSESSMENT NOTE - NS ED NURSE REASSESS COMMENT FT1
Report received from Tawanda Santos RN. Pt AAOx4, NAD, resp nonlabored, skin warm/dry, resting comfortably. Pt denies headache, dizziness, chest pain, palpitations, SOB, abd pain, n/v/d, urinary symptoms, fevers, chills, weakness at this time. Pt awaiting bed upstairs. Safety maintained with call bell within reach.

## 2018-12-24 NOTE — PROGRESS NOTE ADULT - PROBLEM SELECTOR PLAN 2
Pt with CAD/MI with HFrEF, last TTE 2/2018 with EF 20-25%; currently euvolemic   c/w metoprolol succinate 50mg, lisinopril 5mg and aldactone for now   Monitor volume status closely  No inpatient indication for TTE at this time.

## 2018-12-25 LAB
ANION GAP SERPL CALC-SCNC: 16 MMOL/L — SIGNIFICANT CHANGE UP (ref 5–17)
BUN SERPL-MCNC: 26 MG/DL — HIGH (ref 7–23)
CALCIUM SERPL-MCNC: 9.3 MG/DL — SIGNIFICANT CHANGE UP (ref 8.4–10.5)
CHLORIDE SERPL-SCNC: 98 MMOL/L — SIGNIFICANT CHANGE UP (ref 96–108)
CO2 SERPL-SCNC: 20 MMOL/L — LOW (ref 22–31)
CREAT SERPL-MCNC: 1.38 MG/DL — HIGH (ref 0.5–1.3)
GLUCOSE BLDC GLUCOMTR-MCNC: 139 MG/DL — HIGH (ref 70–99)
GLUCOSE BLDC GLUCOMTR-MCNC: 160 MG/DL — HIGH (ref 70–99)
GLUCOSE BLDC GLUCOMTR-MCNC: 188 MG/DL — HIGH (ref 70–99)
GLUCOSE BLDC GLUCOMTR-MCNC: 228 MG/DL — HIGH (ref 70–99)
GLUCOSE SERPL-MCNC: 232 MG/DL — HIGH (ref 70–99)
POTASSIUM SERPL-MCNC: 4.4 MMOL/L — SIGNIFICANT CHANGE UP (ref 3.5–5.3)
POTASSIUM SERPL-SCNC: 4.4 MMOL/L — SIGNIFICANT CHANGE UP (ref 3.5–5.3)
SODIUM SERPL-SCNC: 134 MMOL/L — LOW (ref 135–145)

## 2018-12-25 PROCEDURE — 99233 SBSQ HOSP IP/OBS HIGH 50: CPT

## 2018-12-25 RX ADMIN — Medication 3 MILLILITER(S): at 22:09

## 2018-12-25 RX ADMIN — Medication 81 MILLIGRAM(S): at 11:40

## 2018-12-25 RX ADMIN — LISINOPRIL 5 MILLIGRAM(S): 2.5 TABLET ORAL at 05:09

## 2018-12-25 RX ADMIN — Medication 3 MILLILITER(S): at 11:40

## 2018-12-25 RX ADMIN — Medication 12 UNIT(S): at 13:04

## 2018-12-25 RX ADMIN — Medication 12 UNIT(S): at 17:15

## 2018-12-25 RX ADMIN — Medication 100 MILLIGRAM(S): at 01:19

## 2018-12-25 RX ADMIN — TICAGRELOR 90 MILLIGRAM(S): 90 TABLET ORAL at 17:15

## 2018-12-25 RX ADMIN — Medication 50 MILLIGRAM(S): at 05:09

## 2018-12-25 RX ADMIN — TICAGRELOR 90 MILLIGRAM(S): 90 TABLET ORAL at 05:09

## 2018-12-25 RX ADMIN — Medication 1: at 13:04

## 2018-12-25 RX ADMIN — Medication 650 MILLIGRAM(S): at 17:16

## 2018-12-25 RX ADMIN — SPIRONOLACTONE 25 MILLIGRAM(S): 25 TABLET, FILM COATED ORAL at 05:09

## 2018-12-25 RX ADMIN — Medication 200 MILLIGRAM(S): at 11:40

## 2018-12-25 RX ADMIN — Medication 200 MILLIGRAM(S): at 01:17

## 2018-12-25 RX ADMIN — Medication 12 UNIT(S): at 08:44

## 2018-12-25 RX ADMIN — Medication 75 MILLIGRAM(S): at 17:15

## 2018-12-25 RX ADMIN — Medication 650 MILLIGRAM(S): at 12:11

## 2018-12-25 RX ADMIN — Medication 650 MILLIGRAM(S): at 17:46

## 2018-12-25 RX ADMIN — TAMSULOSIN HYDROCHLORIDE 0.4 MILLIGRAM(S): 0.4 CAPSULE ORAL at 22:09

## 2018-12-25 RX ADMIN — INSULIN GLARGINE 50 UNIT(S): 100 INJECTION, SOLUTION SUBCUTANEOUS at 22:08

## 2018-12-25 RX ADMIN — Medication 2: at 08:44

## 2018-12-25 RX ADMIN — Medication 650 MILLIGRAM(S): at 11:41

## 2018-12-25 RX ADMIN — Medication 200 MILLIGRAM(S): at 22:09

## 2018-12-25 RX ADMIN — Medication 3 MILLILITER(S): at 01:17

## 2018-12-25 RX ADMIN — Medication 75 MILLIGRAM(S): at 05:09

## 2018-12-25 RX ADMIN — Medication 100 MILLIGRAM(S): at 22:09

## 2018-12-25 NOTE — PROGRESS NOTE ADULT - PROBLEM SELECTOR PLAN 3
HgA1c 9.9  C/w home dose insulin - lantus 50u qHS, humalog 12u TID with meals  ISS and monitor FS ac and hs   hold oral medications  Will need close endocrine f/u after discharge.

## 2018-12-25 NOTE — PROGRESS NOTE ADULT - PROBLEM SELECTOR PLAN 1
Pt with cough, SOB, myalgias, fevers/chills, RVP +FluA  CXR (-) for PNA   Continue Tamiflu   Symptom management - tylenol for fevers, pain; antitussives  Hyponatremia - suspect risk of SIADH given symptoms, d/c IV fluids. Improved today 134

## 2018-12-25 NOTE — PROGRESS NOTE ADULT - SUBJECTIVE AND OBJECTIVE BOX
Patient is a 58y old  Male who presents with a chief complaint of flu (24 Dec 2018 12:16)  Subjective: No acute events overnight. Pt reports he had to sleep upright in chair due to 'cough and congestion,' and mildly SOB on ambulating. No sputum, no fevers/chills, no leg swelling.    14 point ros otherwise negative.     VITAL SIGNS:  Vital Signs Last 24 Hrs  T(C): 37.3 (25 Dec 2018 07:40), Max: 37.3 (25 Dec 2018 07:40)  T(F): 99.2 (25 Dec 2018 07:40), Max: 99.2 (25 Dec 2018 07:40)  HR: 97 (25 Dec 2018 07:40) (94 - 101)  BP: 114/75 (25 Dec 2018 07:40) (114/75 - 160/82)  BP(mean): --  RR: 18 (25 Dec 2018 07:40) (18 - 18)  SpO2: 100% (25 Dec 2018 07:40) (97% - 100%)      PHYSICAL EXAM:     GENERAL: no acute distress  HEENT: PERRLA, EOMI, moist oropharynx   RESPIRATORY: +trace bibasilar crackles, no wheeze  CARDIOVASCULAR: RRR, no murmurs, gallops, rubs  ABDOMINAL: soft, non-tender, non-distended, positive bowel sounds   EXTREMITIES: no clubbing, cyanosis, or edema  NEUROLOGICAL: alert and oriented x 3, non-focal  SKIN: no rashes or lesions   MUSCULOSKELETAL: no gross joint deformity                          15.4   8.0   )-----------( 151      ( 24 Dec 2018 06:39 )             44.3     12-25    134<L>  |  98  |  26<H>  ----------------------------<  232<H>  4.4   |  20<L>  |  1.38<H>    Ca    9.3      25 Dec 2018 12:03  Phos  3.3     12-24  Mg     1.9     12-24    TPro  7.9  /  Alb  4.3  /  TBili  0.2  /  DBili  x   /  AST  47<H>  /  ALT  68<H>  /  AlkPhos  79  12-23      CAPILLARY BLOOD GLUCOSE      POCT Blood Glucose.: 228 mg/dL (25 Dec 2018 08:22)  POCT Blood Glucose.: 160 mg/dL (24 Dec 2018 21:27)  POCT Blood Glucose.: 141 mg/dL (24 Dec 2018 17:10)      MEDICATIONS  (STANDING):  aspirin  chewable 81 milliGRAM(s) Oral daily  dextrose 5%. 1000 milliLiter(s) (50 mL/Hr) IV Continuous <Continuous>  dextrose 50% Injectable 12.5 Gram(s) IV Push once  dextrose 50% Injectable 25 Gram(s) IV Push once  dextrose 50% Injectable 25 Gram(s) IV Push once  insulin glargine Injectable (LANTUS) 50 Unit(s) SubCutaneous at bedtime  insulin lispro (HumaLOG) corrective regimen sliding scale   SubCutaneous three times a day before meals  insulin lispro (HumaLOG) corrective regimen sliding scale   SubCutaneous at bedtime  insulin lispro Injectable (HumaLOG) 12 Unit(s) SubCutaneous three times a day before meals  lisinopril 5 milliGRAM(s) Oral daily  metoprolol succinate ER 50 milliGRAM(s) Oral daily  oseltamivir 75 milliGRAM(s) Oral two times a day  spironolactone 25 milliGRAM(s) Oral daily  tamsulosin 0.4 milliGRAM(s) Oral at bedtime  ticagrelor 90 milliGRAM(s) Oral two times a day    MEDICATIONS  (PRN):  acetaminophen   Tablet .. 650 milliGRAM(s) Oral every 6 hours PRN Temp greater or equal to 38C (100.4F), Mild Pain (1 - 3)  ALBUTerol/ipratropium for Nebulization 3 milliLiter(s) Nebulizer every 6 hours PRN Shortness of Breath and/or Wheezing  benzonatate 100 milliGRAM(s) Oral three times a day PRN Cough  dextrose 40% Gel 15 Gram(s) Oral once PRN Blood Glucose LESS THAN 70 milliGRAM(s)/deciliter  glucagon  Injectable 1 milliGRAM(s) IntraMuscular once PRN Glucose LESS THAN 70 milligrams/deciliter  guaiFENesin    Syrup 200 milliGRAM(s) Oral every 6 hours PRN Cough

## 2018-12-25 NOTE — PROGRESS NOTE ADULT - PROBLEM SELECTOR PLAN 2
Pt with CAD/MI with HFrEF, last TTE 2/2018 with EF 20-25%; currently euvolemic   c/w metoprolol succinate 50mg, lisinopril 5mg and aldactone for now   Monitor volume status closely- presently euvolemic but pt reports having to sleep upright overnight. Though more consistent with his flu symptoms, would be wary of going into heart failure given low EF  - monitor overnight for worsening clinical/fluid status  - No inpatient indication for TTE at this time unless patient becomes overtly overloaded

## 2018-12-26 ENCOUNTER — TRANSCRIPTION ENCOUNTER (OUTPATIENT)
Age: 58
End: 2018-12-26

## 2018-12-26 VITALS
TEMPERATURE: 98 F | OXYGEN SATURATION: 100 % | HEART RATE: 97 BPM | DIASTOLIC BLOOD PRESSURE: 70 MMHG | SYSTOLIC BLOOD PRESSURE: 115 MMHG | RESPIRATION RATE: 17 BRPM

## 2018-12-26 LAB
ANION GAP SERPL CALC-SCNC: 13 MMOL/L — SIGNIFICANT CHANGE UP (ref 5–17)
BASOPHILS # BLD AUTO: 0.01 K/UL — SIGNIFICANT CHANGE UP (ref 0–0.2)
BASOPHILS NFR BLD AUTO: 0.2 % — SIGNIFICANT CHANGE UP (ref 0–2)
BUN SERPL-MCNC: 33 MG/DL — HIGH (ref 7–23)
CALCIUM SERPL-MCNC: 9 MG/DL — SIGNIFICANT CHANGE UP (ref 8.4–10.5)
CHLORIDE SERPL-SCNC: 100 MMOL/L — SIGNIFICANT CHANGE UP (ref 96–108)
CO2 SERPL-SCNC: 23 MMOL/L — SIGNIFICANT CHANGE UP (ref 22–31)
CREAT SERPL-MCNC: 1.48 MG/DL — HIGH (ref 0.5–1.3)
EOSINOPHIL # BLD AUTO: 0.15 K/UL — SIGNIFICANT CHANGE UP (ref 0–0.5)
EOSINOPHIL NFR BLD AUTO: 2.8 % — SIGNIFICANT CHANGE UP (ref 0–6)
GLUCOSE BLDC GLUCOMTR-MCNC: 181 MG/DL — HIGH (ref 70–99)
GLUCOSE BLDC GLUCOMTR-MCNC: 189 MG/DL — HIGH (ref 70–99)
GLUCOSE SERPL-MCNC: 193 MG/DL — HIGH (ref 70–99)
HCT VFR BLD CALC: 44 % — SIGNIFICANT CHANGE UP (ref 39–50)
HGB BLD-MCNC: 14.2 G/DL — SIGNIFICANT CHANGE UP (ref 13–17)
IMM GRANULOCYTES NFR BLD AUTO: 0.4 % — SIGNIFICANT CHANGE UP (ref 0–1.5)
LYMPHOCYTES # BLD AUTO: 1.49 K/UL — SIGNIFICANT CHANGE UP (ref 1–3.3)
LYMPHOCYTES # BLD AUTO: 28.2 % — SIGNIFICANT CHANGE UP (ref 13–44)
MCHC RBC-ENTMCNC: 27 PG — SIGNIFICANT CHANGE UP (ref 27–34)
MCHC RBC-ENTMCNC: 32.3 GM/DL — SIGNIFICANT CHANGE UP (ref 32–36)
MCV RBC AUTO: 83.7 FL — SIGNIFICANT CHANGE UP (ref 80–100)
MONOCYTES # BLD AUTO: 0.52 K/UL — SIGNIFICANT CHANGE UP (ref 0–0.9)
MONOCYTES NFR BLD AUTO: 9.8 % — SIGNIFICANT CHANGE UP (ref 2–14)
NEUTROPHILS # BLD AUTO: 3.09 K/UL — SIGNIFICANT CHANGE UP (ref 1.8–7.4)
NEUTROPHILS NFR BLD AUTO: 58.6 % — SIGNIFICANT CHANGE UP (ref 43–77)
PLATELET # BLD AUTO: 156 K/UL — SIGNIFICANT CHANGE UP (ref 150–400)
POTASSIUM SERPL-MCNC: 4.3 MMOL/L — SIGNIFICANT CHANGE UP (ref 3.5–5.3)
POTASSIUM SERPL-SCNC: 4.3 MMOL/L — SIGNIFICANT CHANGE UP (ref 3.5–5.3)
RBC # BLD: 5.26 M/UL — SIGNIFICANT CHANGE UP (ref 4.2–5.8)
RBC # FLD: 15 % — HIGH (ref 10.3–14.5)
SODIUM SERPL-SCNC: 136 MMOL/L — SIGNIFICANT CHANGE UP (ref 135–145)
WBC # BLD: 5.28 K/UL — SIGNIFICANT CHANGE UP (ref 3.8–10.5)
WBC # FLD AUTO: 5.28 K/UL — SIGNIFICANT CHANGE UP (ref 3.8–10.5)

## 2018-12-26 PROCEDURE — 83735 ASSAY OF MAGNESIUM: CPT

## 2018-12-26 PROCEDURE — 80053 COMPREHEN METABOLIC PANEL: CPT

## 2018-12-26 PROCEDURE — 84484 ASSAY OF TROPONIN QUANT: CPT

## 2018-12-26 PROCEDURE — 83036 HEMOGLOBIN GLYCOSYLATED A1C: CPT

## 2018-12-26 PROCEDURE — 96374 THER/PROPH/DIAG INJ IV PUSH: CPT

## 2018-12-26 PROCEDURE — 84100 ASSAY OF PHOSPHORUS: CPT

## 2018-12-26 PROCEDURE — 87798 DETECT AGENT NOS DNA AMP: CPT

## 2018-12-26 PROCEDURE — 99285 EMERGENCY DEPT VISIT HI MDM: CPT | Mod: 25

## 2018-12-26 PROCEDURE — 93005 ELECTROCARDIOGRAM TRACING: CPT

## 2018-12-26 PROCEDURE — 87581 M.PNEUMON DNA AMP PROBE: CPT

## 2018-12-26 PROCEDURE — 80048 BASIC METABOLIC PNL TOTAL CA: CPT

## 2018-12-26 PROCEDURE — 94640 AIRWAY INHALATION TREATMENT: CPT

## 2018-12-26 PROCEDURE — 87633 RESP VIRUS 12-25 TARGETS: CPT

## 2018-12-26 PROCEDURE — 83880 ASSAY OF NATRIURETIC PEPTIDE: CPT

## 2018-12-26 PROCEDURE — 82962 GLUCOSE BLOOD TEST: CPT

## 2018-12-26 PROCEDURE — 99239 HOSP IP/OBS DSCHRG MGMT >30: CPT

## 2018-12-26 PROCEDURE — 85027 COMPLETE CBC AUTOMATED: CPT

## 2018-12-26 PROCEDURE — 87486 CHLMYD PNEUM DNA AMP PROBE: CPT

## 2018-12-26 RX ORDER — ALBUTEROL 90 UG/1
2 AEROSOL, METERED ORAL
Qty: 1 | Refills: 0
Start: 2018-12-26 | End: 2019-01-24

## 2018-12-26 RX ADMIN — Medication 75 MILLIGRAM(S): at 06:14

## 2018-12-26 RX ADMIN — TICAGRELOR 90 MILLIGRAM(S): 90 TABLET ORAL at 06:14

## 2018-12-26 RX ADMIN — Medication 2: at 12:32

## 2018-12-26 RX ADMIN — Medication 1: at 08:31

## 2018-12-26 RX ADMIN — Medication 100 MILLIGRAM(S): at 12:36

## 2018-12-26 RX ADMIN — Medication 12 UNIT(S): at 08:32

## 2018-12-26 RX ADMIN — Medication 12 UNIT(S): at 12:32

## 2018-12-26 RX ADMIN — Medication 50 MILLIGRAM(S): at 06:14

## 2018-12-26 RX ADMIN — SPIRONOLACTONE 25 MILLIGRAM(S): 25 TABLET, FILM COATED ORAL at 06:14

## 2018-12-26 RX ADMIN — Medication 3 MILLILITER(S): at 12:36

## 2018-12-26 RX ADMIN — Medication 3 MILLILITER(S): at 06:15

## 2018-12-26 RX ADMIN — Medication 81 MILLIGRAM(S): at 12:32

## 2018-12-26 RX ADMIN — LISINOPRIL 5 MILLIGRAM(S): 2.5 TABLET ORAL at 06:14

## 2018-12-26 NOTE — DISCHARGE NOTE ADULT - SECONDARY DIAGNOSIS.
Type 2 diabetes mellitus with chronic kidney disease, with long-term current use of insulin, unspecified CKD stage Chronic systolic heart failure MANJIT (acute kidney injury)

## 2018-12-26 NOTE — DISCHARGE NOTE ADULT - PATIENT PORTAL LINK FT
You can access the [facility] Patient Portal, offered by [facility], by registering with the following website: [email or web address]

## 2018-12-26 NOTE — DISCHARGE NOTE ADULT - HOSPITAL COURSE
58M with PMH of T2DM, HTN, CAD/MI s/p stents (most recent 2/2018), HFrEF p/w cough and SOB x 2d, found to have influenza A  Chest X ray (-) for PNA. Started  Tamiflu nebs and cough meds. Now afebrile , cough and  improved . Found to have hyponatremia also,   now improved. Cleared for discharge as per Dr Zelaya. 58M with PMH of T2DM, HTN, CAD/MI s/p stents (most recent 2/2018), HFrEF p/w cough and SOB x 2d, found to have influenza A  Chest X ray (-) for PNA. Started  Tamiflu nebs and cough meds. Now afebrile , cough and  improved . Found to have hyponatremia also,   now improved. Cleared for discharge as per Dr Zelaya. Holding metformin for now due to MANJIT.

## 2018-12-26 NOTE — DISCHARGE NOTE ADULT - MEDICATION SUMMARY - MEDICATIONS TO TAKE
I will START or STAY ON the medications listed below when I get home from the hospital:    spironolactone 25 mg oral tablet  -- 1 tab(s) by mouth once a day  -- Indication: For Chronic systolic heart failure    aspirin 81 mg oral tablet, chewable  -- 1 tab(s) by mouth once a day  -- Indication: For Coronary artery disease involving native coronary artery of native heart without angina pectoris    lisinopril 5 mg oral tablet  -- 1 tab(s) by mouth once a day  -- Indication: For Chronic systolic heart failure    tamsulosin 0.4 mg oral capsule  -- 1 cap(s) by mouth once a day  -- Indication: For prostate    HumaLOG KwikPen 100 units/mL injectable solution  -- 12 unit(s) injectable 3 times a day   -- Indication: For Type 2 diabetes mellitus with chronic kidney disease, with long-term current use of insulin, unspecified CKD stage    Lantus 100 units/mL subcutaneous solution  -- 50 unit(s) subcutaneous once a day (at bedtime)  -- Indication: For Type 2 diabetes mellitus with chronic kidney disease, with long-term current use of insulin, unspecified CKD stage    ticagrelor 90 mg oral tablet  -- 1 tab(s) by mouth 2 times a day  -- Indication: For Coronary artery disease involving native coronary artery of native heart without angina pectoris    benzonatate 100 mg oral capsule  -- 1 cap(s) by mouth 3 times a day, As needed, Cough  -- Indication: For Cough    oseltamivir 75 mg oral capsule  -- 1 cap(s) by mouth 2 times a day  -- Indication: For Influenza A    metoprolol succinate 50 mg oral tablet, extended release  -- 1 tab(s) by mouth once a day  -- Indication: For Essential hypertension    Proventil HFA 90 mcg/inh inhalation aerosol  -- 2 puff(s) inhaled 4 times a day, As Needed   -- For inhalation only.  It is very important that you take or use this exactly as directed.  Do not skip doses or discontinue unless directed by your doctor.  Obtain medical advice before taking any non-prescription drugs as some may affect the action of this medication.  Shake well before use.    -- Indication: For Shortness of breath    guaiFENesin 100 mg/5 mL oral liquid  -- 10 milliliter(s) by mouth every 6 hours, As needed, Cough  -- Indication: For Cough

## 2018-12-26 NOTE — PROGRESS NOTE ADULT - ATTENDING COMMENTS
Can d/c home today  F/U with PCP in 1 week, recheck BMP at that time.  Discussed with patient and his wife.   45 minutes spent coordinating discharge.

## 2018-12-26 NOTE — DISCHARGE NOTE ADULT - MEDICATION SUMMARY - MEDICATIONS TO STOP TAKING
I will STOP taking the medications listed below when I get home from the hospital:    metFORMIN 1000 mg oral tablet  -- 1 tab(s) by mouth 2 times a day   -- Check with your doctor before becoming pregnant.  Do not drink alcoholic beverages when taking this medication.  It is very important that you take or use this exactly as directed.  Do not skip doses or discontinue unless directed by your doctor.  Obtain medical advice before taking any non-prescription drugs as some may affect the action of this medication.  Take with food or milk.

## 2018-12-26 NOTE — PROGRESS NOTE ADULT - PROBLEM SELECTOR PLAN 1
-feeling much better today  -Cr Clearance is 92, can continue with current dose of tamiflu, to be completed 12/27   -continue with supportive care

## 2018-12-26 NOTE — DISCHARGE NOTE ADULT - CARE PLAN
Principal Discharge DX:	Influenza A  Secondary Diagnosis:	Type 2 diabetes mellitus with chronic kidney disease, with long-term current use of insulin, unspecified CKD stage  Secondary Diagnosis:	Chronic systolic heart failure Principal Discharge DX:	Influenza A  Goal:	Resolution  Assessment and plan of treatment:	Improved. Afebrile now  Secondary Diagnosis:	Type 2 diabetes mellitus with chronic kidney disease, with long-term current use of insulin, unspecified CKD stage  Assessment and plan of treatment:	Make sure you get your HgA1c checked every three months.  If you take oral diabetes medications, check your blood glucose two times a day.  If you take insulin, check your blood glucose before meals and at bedtime.  It's important not to skip any meals.  Keep a log of your blood glucose results and always take it with you to your doctor appointments.  Keep a list of your current medications including injectables and over the counter medications and bring this medication list with you to all your doctor appointments.  If you have not seen your ophthalmologist this year call for appointment.  Check your feet daily for redness, sores, or openings. Do not self treat. If no improvement in two days call your primary care physician for an appointment.  Low blood sugar (hypoglycemia) is a blood sugar below 70mg/dl. Check your blood sugar if you feel signs/symptoms of hypoglycemia. If your blood sugar is below 70 take 15 grams of carbohydrates (ex 4 oz of apple juice, 3-4 glucose tablets, or 4-6 oz of regular soda) wait 15 minutes and repeat blood sugar to make sure it comes up above 70.  If your blood sugar is above 70 and you are due for a meal, have a meal.  If you are not due for a meal have a snack.  This snack helps keeps your blood sugar at a safe range.  Secondary Diagnosis:	Chronic systolic heart failure  Assessment and plan of treatment:	Weigh yourself daily.  If you gain 3lbs in 3 days, or 5lbs in a week call your Health Care Provider.  Do not eat or drink foods containing more than 2000mg of salt (sodium) in your diet every day.  Call your Health Care Provider if you have any swelling or increased swelling in your feet, ankles, and/or stomach.  Take all of your medication as directed.  If you become dizzy call your Health Care Provider. Principal Discharge DX:	Influenza A  Goal:	Resolution  Assessment and plan of treatment:	Improved. Afebrile now  Secondary Diagnosis:	Type 2 diabetes mellitus with chronic kidney disease, with long-term current use of insulin, unspecified CKD stage  Assessment and plan of treatment:	A1c 9.9  Make sure you get your HgA1c checked every three months.  If you take oral diabetes medications, check your blood glucose two times a day.  If you take insulin, check your blood glucose before meals and at bedtime.  It's important not to skip any meals.  Keep a log of your blood glucose results and always take it with you to your doctor appointments.  Keep a list of your current medications including injectables and over the counter medications and bring this medication list with you to all your doctor appointments.  If you have not seen your ophthalmologist this year call for appointment.  Check your feet daily for redness, sores, or openings. Do not self treat. If no improvement in two days call your primary care physician for an appointment.  Low blood sugar (hypoglycemia) is a blood sugar below 70mg/dl. Check your blood sugar if you feel signs/symptoms of hypoglycemia. If your blood sugar is below 70 take 15 grams of carbohydrates (ex 4 oz of apple juice, 3-4 glucose tablets, or 4-6 oz of regular soda) wait 15 minutes and repeat blood sugar to make sure it comes up above 70.  If your blood sugar is above 70 and you are due for a meal, have a meal.  If you are not due for a meal have a snack.  This snack helps keeps your blood sugar at a safe range.  Please monitor blood sugar closely, since you are not taking metformin now  Secondary Diagnosis:	Chronic systolic heart failure  Assessment and plan of treatment:	Weigh yourself daily.  If you gain 3lbs in 3 days, or 5lbs in a week call your Health Care Provider.  Do not eat or drink foods containing more than 2000mg of salt (sodium) in your diet every day.  Call your Health Care Provider if you have any swelling or increased swelling in your feet, ankles, and/or stomach.  Take all of your medication as directed.  If you become dizzy call your Health Care Provider.  Secondary Diagnosis:	MANJIT (acute kidney injury)  Assessment and plan of treatment:	MANJIT on CKD  Avoid taking (NSAIDs) - (ex: Ibuprofen, Advil, Celebrex, Naprosyn)  Avoid taking any nephrotoxic agents (can harm kidneys) - Intravenous contrast for diagnostic testing, combination cold medications.  Have all medications adjusted for your renal function by your Health Care Provider.  Blood pressure control is important.  Take all medication as prescribed.  Hold metformin for now, due to elevated creatinine. Needs to have a creatinine check done on your next visit with your PMD which is on 1/2/19 as you have told us.

## 2018-12-26 NOTE — PROGRESS NOTE ADULT - PROBLEM SELECTOR PLAN 2
-Pt with CAD/MI with HFrEF, last TTE 2/2018 with EF 20-25%; currently euvolemic   -c/w metoprolol succinate 50mg, lisinopril 5mg and aldactone  -appears compensated on exam

## 2018-12-26 NOTE — PROGRESS NOTE ADULT - SUBJECTIVE AND OBJECTIVE BOX
Patient is a 58y old  Male who presents with a chief complaint of flu (26 Dec 2018 11:55)    SUBJECTIVE / OVERNIGHT EVENTS: no acute events overnight, pt feels well today, slept comfortably with HOB down yesterday night. Asking to go home.     MEDICATIONS  (STANDING):  aspirin  chewable 81 milliGRAM(s) Oral daily  dextrose 5%. 1000 milliLiter(s) (50 mL/Hr) IV Continuous <Continuous>  dextrose 50% Injectable 12.5 Gram(s) IV Push once  dextrose 50% Injectable 25 Gram(s) IV Push once  dextrose 50% Injectable 25 Gram(s) IV Push once  insulin glargine Injectable (LANTUS) 50 Unit(s) SubCutaneous at bedtime  insulin lispro (HumaLOG) corrective regimen sliding scale   SubCutaneous three times a day before meals  insulin lispro (HumaLOG) corrective regimen sliding scale   SubCutaneous at bedtime  insulin lispro Injectable (HumaLOG) 12 Unit(s) SubCutaneous three times a day before meals  lisinopril 5 milliGRAM(s) Oral daily  metoprolol succinate ER 50 milliGRAM(s) Oral daily  oseltamivir 75 milliGRAM(s) Oral two times a day  spironolactone 25 milliGRAM(s) Oral daily  tamsulosin 0.4 milliGRAM(s) Oral at bedtime  ticagrelor 90 milliGRAM(s) Oral two times a day    MEDICATIONS  (PRN):  acetaminophen   Tablet .. 650 milliGRAM(s) Oral every 6 hours PRN Temp greater or equal to 38C (100.4F), Mild Pain (1 - 3)  ALBUTerol/ipratropium for Nebulization 3 milliLiter(s) Nebulizer every 6 hours PRN Shortness of Breath and/or Wheezing  benzonatate 100 milliGRAM(s) Oral three times a day PRN Cough  dextrose 40% Gel 15 Gram(s) Oral once PRN Blood Glucose LESS THAN 70 milliGRAM(s)/deciliter  glucagon  Injectable 1 milliGRAM(s) IntraMuscular once PRN Glucose LESS THAN 70 milligrams/deciliter  guaiFENesin    Syrup 200 milliGRAM(s) Oral every 6 hours PRN Cough      Vital Signs Last 24 Hrs  T(C): 36.7 (26 Dec 2018 08:12), Max: 37.1 (25 Dec 2018 23:25)  T(F): 98.1 (26 Dec 2018 08:12), Max: 98.7 (25 Dec 2018 23:25)  HR: 89 (26 Dec 2018 08:12) (86 - 97)  BP: 108/71 (26 Dec 2018 08:12) (108/71 - 127/79)  BP(mean): --  RR: 18 (26 Dec 2018 08:12) (18 - 18)  SpO2: 98% (26 Dec 2018 08:12) (98% - 100%)  CAPILLARY BLOOD GLUCOSE      POCT Blood Glucose.: 189 mg/dL (26 Dec 2018 08:02)  POCT Blood Glucose.: 160 mg/dL (25 Dec 2018 21:34)  POCT Blood Glucose.: 139 mg/dL (25 Dec 2018 17:04)  POCT Blood Glucose.: 188 mg/dL (25 Dec 2018 13:01)  POCT Blood Glucose.: 181 mg/dL (25 Dec 2018 12:12)    I&O's Summary    25 Dec 2018 07:01  -  26 Dec 2018 07:00  --------------------------------------------------------  IN: 250 mL / OUT: 0 mL / NET: 250 mL        PHYSICAL EXAM:  GENERAL: NAD, well-developed  EYES: EOMI, conjunctiva and sclera clear  NECK: Supple, No JVD  CHEST/LUNG: Clear to auscultation bilaterally; No wheeze  HEART: Regular rate and rhythm; +S1/S2  ABDOMEN: Soft, Nontender, Nondistended; Bowel sounds present  EXTREMITIES:  no peripheral edema   PSYCH: AAOx3      LABS:                        14.2   5.28  )-----------( 156      ( 26 Dec 2018 08:12 )             44.0     12-26    136  |  100  |  33<H>  ----------------------------<  193<H>  4.3   |  23  |  1.48<H>    Ca    9.0      26 Dec 2018 06:55

## 2018-12-26 NOTE — DISCHARGE NOTE ADULT - PLAN OF CARE
Resolution Improved. Afebrile now Make sure you get your HgA1c checked every three months.  If you take oral diabetes medications, check your blood glucose two times a day.  If you take insulin, check your blood glucose before meals and at bedtime.  It's important not to skip any meals.  Keep a log of your blood glucose results and always take it with you to your doctor appointments.  Keep a list of your current medications including injectables and over the counter medications and bring this medication list with you to all your doctor appointments.  If you have not seen your ophthalmologist this year call for appointment.  Check your feet daily for redness, sores, or openings. Do not self treat. If no improvement in two days call your primary care physician for an appointment.  Low blood sugar (hypoglycemia) is a blood sugar below 70mg/dl. Check your blood sugar if you feel signs/symptoms of hypoglycemia. If your blood sugar is below 70 take 15 grams of carbohydrates (ex 4 oz of apple juice, 3-4 glucose tablets, or 4-6 oz of regular soda) wait 15 minutes and repeat blood sugar to make sure it comes up above 70.  If your blood sugar is above 70 and you are due for a meal, have a meal.  If you are not due for a meal have a snack.  This snack helps keeps your blood sugar at a safe range. Weigh yourself daily.  If you gain 3lbs in 3 days, or 5lbs in a week call your Health Care Provider.  Do not eat or drink foods containing more than 2000mg of salt (sodium) in your diet every day.  Call your Health Care Provider if you have any swelling or increased swelling in your feet, ankles, and/or stomach.  Take all of your medication as directed.  If you become dizzy call your Health Care Provider. A1c 9.9  Make sure you get your HgA1c checked every three months.  If you take oral diabetes medications, check your blood glucose two times a day.  If you take insulin, check your blood glucose before meals and at bedtime.  It's important not to skip any meals.  Keep a log of your blood glucose results and always take it with you to your doctor appointments.  Keep a list of your current medications including injectables and over the counter medications and bring this medication list with you to all your doctor appointments.  If you have not seen your ophthalmologist this year call for appointment.  Check your feet daily for redness, sores, or openings. Do not self treat. If no improvement in two days call your primary care physician for an appointment.  Low blood sugar (hypoglycemia) is a blood sugar below 70mg/dl. Check your blood sugar if you feel signs/symptoms of hypoglycemia. If your blood sugar is below 70 take 15 grams of carbohydrates (ex 4 oz of apple juice, 3-4 glucose tablets, or 4-6 oz of regular soda) wait 15 minutes and repeat blood sugar to make sure it comes up above 70.  If your blood sugar is above 70 and you are due for a meal, have a meal.  If you are not due for a meal have a snack.  This snack helps keeps your blood sugar at a safe range.  Please monitor blood sugar closely, since you are not taking metformin now MANJIT on CKD  Avoid taking (NSAIDs) - (ex: Ibuprofen, Advil, Celebrex, Naprosyn)  Avoid taking any nephrotoxic agents (can harm kidneys) - Intravenous contrast for diagnostic testing, combination cold medications.  Have all medications adjusted for your renal function by your Health Care Provider.  Blood pressure control is important.  Take all medication as prescribed.  Hold metformin for now, due to elevated creatinine. Needs to have a creatinine check done on your next visit with your PMD which is on 1/2/19 as you have told us.

## 2018-12-26 NOTE — PROGRESS NOTE ADULT - PROBLEM SELECTOR PLAN 3
HgA1c 9.9  C/w home dose insulin - lantus 50u qHS, humalog 12u TID with meals  ISS and monitor FS ac and hs   hold oral medications  -needs endocrine f/u as an outpt

## 2019-01-21 ENCOUNTER — APPOINTMENT (OUTPATIENT)
Dept: CARDIOLOGY | Facility: CLINIC | Age: 59
End: 2019-01-21

## 2019-02-10 ENCOUNTER — EMERGENCY (EMERGENCY)
Facility: HOSPITAL | Age: 59
LOS: 1 days | Discharge: HOME | End: 2019-02-10
Attending: EMERGENCY MEDICINE
Payer: MEDICARE

## 2019-02-10 VITALS
HEART RATE: 93 BPM | DIASTOLIC BLOOD PRESSURE: 83 MMHG | HEIGHT: 77 IN | RESPIRATION RATE: 18 BRPM | SYSTOLIC BLOOD PRESSURE: 135 MMHG | OXYGEN SATURATION: 97 % | TEMPERATURE: 98 F | WEIGHT: 259.93 LBS

## 2019-02-10 VITALS
OXYGEN SATURATION: 97 % | RESPIRATION RATE: 18 BRPM | SYSTOLIC BLOOD PRESSURE: 121 MMHG | HEART RATE: 86 BPM | TEMPERATURE: 98 F | DIASTOLIC BLOOD PRESSURE: 87 MMHG

## 2019-02-10 LAB
ALBUMIN SERPL ELPH-MCNC: 4.4 G/DL — SIGNIFICANT CHANGE UP (ref 3.3–5)
ALP SERPL-CCNC: 74 U/L — SIGNIFICANT CHANGE UP (ref 40–120)
ALT FLD-CCNC: 35 U/L — SIGNIFICANT CHANGE UP (ref 10–45)
ANION GAP SERPL CALC-SCNC: 16 MMOL/L — SIGNIFICANT CHANGE UP (ref 5–17)
APTT BLD: 30 SEC — SIGNIFICANT CHANGE UP (ref 27.5–36.3)
AST SERPL-CCNC: 20 U/L — SIGNIFICANT CHANGE UP (ref 10–40)
BILIRUB SERPL-MCNC: 0.2 MG/DL — SIGNIFICANT CHANGE UP (ref 0.2–1.2)
BUN SERPL-MCNC: 26 MG/DL — HIGH (ref 7–23)
CALCIUM SERPL-MCNC: 9.6 MG/DL — SIGNIFICANT CHANGE UP (ref 8.4–10.5)
CHLORIDE SERPL-SCNC: 100 MMOL/L — SIGNIFICANT CHANGE UP (ref 96–108)
CO2 SERPL-SCNC: 21 MMOL/L — LOW (ref 22–31)
CREAT SERPL-MCNC: 1.31 MG/DL — HIGH (ref 0.5–1.3)
GLUCOSE SERPL-MCNC: 340 MG/DL — HIGH (ref 70–99)
HCT VFR BLD CALC: 41.2 % — SIGNIFICANT CHANGE UP (ref 39–50)
HGB BLD-MCNC: 14.9 G/DL — SIGNIFICANT CHANGE UP (ref 13–17)
INR BLD: 0.93 RATIO — SIGNIFICANT CHANGE UP (ref 0.88–1.16)
MAGNESIUM SERPL-MCNC: 2.1 MG/DL — SIGNIFICANT CHANGE UP (ref 1.6–2.6)
MCHC RBC-ENTMCNC: 28.1 PG — SIGNIFICANT CHANGE UP (ref 27–34)
MCHC RBC-ENTMCNC: 36.1 GM/DL — HIGH (ref 32–36)
MCV RBC AUTO: 77.9 FL — LOW (ref 80–100)
NT-PROBNP SERPL-SCNC: 184 PG/ML — SIGNIFICANT CHANGE UP (ref 0–300)
PLATELET # BLD AUTO: 179 K/UL — SIGNIFICANT CHANGE UP (ref 150–400)
POTASSIUM SERPL-MCNC: 4.3 MMOL/L — SIGNIFICANT CHANGE UP (ref 3.5–5.3)
POTASSIUM SERPL-SCNC: 4.3 MMOL/L — SIGNIFICANT CHANGE UP (ref 3.5–5.3)
PROT SERPL-MCNC: 7.3 G/DL — SIGNIFICANT CHANGE UP (ref 6–8.3)
PROTHROM AB SERPL-ACNC: 10.7 SEC — SIGNIFICANT CHANGE UP (ref 10–12.9)
RBC # BLD: 5.29 M/UL — SIGNIFICANT CHANGE UP (ref 4.2–5.8)
RBC # FLD: 14.1 % — SIGNIFICANT CHANGE UP (ref 10.3–14.5)
SODIUM SERPL-SCNC: 137 MMOL/L — SIGNIFICANT CHANGE UP (ref 135–145)
TROPONIN T, HIGH SENSITIVITY RESULT: 29 NG/L — SIGNIFICANT CHANGE UP (ref 0–51)
TROPONIN T, HIGH SENSITIVITY RESULT: 29 NG/L — SIGNIFICANT CHANGE UP (ref 0–51)
WBC # BLD: 8.8 K/UL — SIGNIFICANT CHANGE UP (ref 3.8–10.5)
WBC # FLD AUTO: 8.8 K/UL — SIGNIFICANT CHANGE UP (ref 3.8–10.5)

## 2019-02-10 PROCEDURE — 93005 ELECTROCARDIOGRAM TRACING: CPT

## 2019-02-10 PROCEDURE — 71046 X-RAY EXAM CHEST 2 VIEWS: CPT | Mod: 26

## 2019-02-10 PROCEDURE — 85610 PROTHROMBIN TIME: CPT

## 2019-02-10 PROCEDURE — 83880 ASSAY OF NATRIURETIC PEPTIDE: CPT

## 2019-02-10 PROCEDURE — 84484 ASSAY OF TROPONIN QUANT: CPT

## 2019-02-10 PROCEDURE — 83735 ASSAY OF MAGNESIUM: CPT

## 2019-02-10 PROCEDURE — 85027 COMPLETE CBC AUTOMATED: CPT

## 2019-02-10 PROCEDURE — 99285 EMERGENCY DEPT VISIT HI MDM: CPT | Mod: GC,25

## 2019-02-10 PROCEDURE — 93010 ELECTROCARDIOGRAM REPORT: CPT

## 2019-02-10 PROCEDURE — 80053 COMPREHEN METABOLIC PANEL: CPT

## 2019-02-10 PROCEDURE — 85730 THROMBOPLASTIN TIME PARTIAL: CPT

## 2019-02-10 PROCEDURE — 99284 EMERGENCY DEPT VISIT MOD MDM: CPT | Mod: 25

## 2019-02-10 PROCEDURE — 71046 X-RAY EXAM CHEST 2 VIEWS: CPT

## 2019-02-10 RX ORDER — ASPIRIN/CALCIUM CARB/MAGNESIUM 324 MG
162 TABLET ORAL DAILY
Qty: 0 | Refills: 0 | Status: DISCONTINUED | OUTPATIENT
Start: 2019-02-10 | End: 2019-02-14

## 2019-02-10 RX ADMIN — Medication 162 MILLIGRAM(S): at 19:43

## 2019-02-10 RX ADMIN — Medication 30 MILLILITER(S): at 19:43

## 2019-02-10 NOTE — ED PROVIDER NOTE - NSFOLLOWUPINSTRUCTIONS_ED_ALL_ED_FT
Seek immediate medical care for any new/worsening signs or symptoms.   Your workup is not complete, follow up with your cardiologist or cardiology clinic at [phone] for a follow up appointment in next 48 hours.  If your chest pain worsens return to the emergency department.  No exertion faster than walking speed until cleared by cardiologist.

## 2019-02-10 NOTE — ED PROVIDER NOTE - PROGRESS NOTE DETAILS
pt had repeat episode of chest pain, ekg repeated, unchanged initial trop 29 pending repeat. Will admit, pending repeat trop. rpt trop 29 will d/c home, pt offered admission refused. no reoccurrence of chest pain. Will have pt follow up with his cardiologist.

## 2019-02-10 NOTE — ED PROVIDER NOTE - OBJECTIVE STATEMENT
Attendinyo male presents with chest pressure since around 130pm.  Pt states he got short of breath when he went to  a pizza around 1pm.  Pt ate the pizza and then developed nausea and vomited.  still has persistent chest discomfort.  symptoms are improving.  no sweating.  no shortness of breath at this time.

## 2019-02-10 NOTE — ED ADULT NURSE NOTE - OBJECTIVE STATEMENT
58 year old A&Ox 56 year old A&Ox3 male presents ambulatory to ED complaining of CP starting today at approximately 1600. PMH "2 MI's," and 2 stents last one placed last Feb. +ASA and brilinta use. Patient confirms having one episode of nonbloody emesis PTA. CP is non-radiating and midsternal.  Patient denies current  abdominal pain, SOB, n/v/d, fevers, chills, HA, blurry vision. States improvement in CP, currently a 4/10 declines pain medication at this time. EKG done, CM applied. VSS. Patient and family updated on plan of care.

## 2019-02-10 NOTE — ED PROVIDER NOTE - MEDICAL DECISION MAKING DETAILS
Chest pressure with vomiting, concerning for ACS.  EKG unchanged from prior EKGs in the system.  will continue to monitor.  check CXR, serial troponin.  will likely admit for further evaluation.

## 2019-02-10 NOTE — ED ADULT NURSE NOTE - CHPI ED NUR SYMPTOMS NEG
no back pain/no syncope/no congestion/no diaphoresis/no shortness of breath/no chills/no dizziness/no fever

## 2019-02-14 ENCOUNTER — APPOINTMENT (OUTPATIENT)
Dept: CARDIOLOGY | Facility: CLINIC | Age: 59
End: 2019-02-14
Payer: COMMERCIAL

## 2019-02-14 ENCOUNTER — NON-APPOINTMENT (OUTPATIENT)
Age: 59
End: 2019-02-14

## 2019-02-14 VITALS
OXYGEN SATURATION: 97 % | DIASTOLIC BLOOD PRESSURE: 75 MMHG | BODY MASS INDEX: 31.29 KG/M2 | HEART RATE: 108 BPM | SYSTOLIC BLOOD PRESSURE: 127 MMHG | WEIGHT: 265 LBS | HEIGHT: 77 IN

## 2019-02-14 VITALS — HEART RATE: 96 BPM

## 2019-02-14 DIAGNOSIS — K21.0 GASTRO-ESOPHAGEAL REFLUX DISEASE WITH ESOPHAGITIS: ICD-10-CM

## 2019-02-14 PROCEDURE — 99214 OFFICE O/P EST MOD 30 MIN: CPT | Mod: 25

## 2019-02-14 PROCEDURE — 93000 ELECTROCARDIOGRAM COMPLETE: CPT | Mod: 59

## 2019-02-14 PROCEDURE — 93289 INTERROG DEVICE EVAL HEART: CPT

## 2019-02-14 NOTE — REASON FOR VISIT
[FreeTextEntry1] : The patient comes in for followup of his coronary artery disease, hypertension, chronic systolic heart failure, diabetes, hypercholesterolemia and he gets occasional chest discomforts that sometimes are relieved with burping. He gets dyspnea on exertion. He had a nuclear stress test which showed scar but no ischemia. This is the 4th  office visit since the patient had an anterior wall STEMI  on February 5, 2018. He went to the [facility] emergency room. He underwent emergent cardiac catheterization and had a stent placed and a large ramus artery. He was discharged 3 days later. He has not had chest pain since the hospital. \par He currently has an upper respiratory infection. He is trying to quit smoking. He feels short of breath when he lies down to sleep. He wakes up frequently at night did not want anything for sleep apnea. When he wakes up he goes and eats. He had some chest tingling but felt completely different than his heart attack discomfort.

## 2019-02-14 NOTE — DISCUSSION/SUMMARY
[FreeTextEntry1] : The patient was examined. His blood pressure was 127/75. His pulse was 96. His lungs were clear to auscultation. Cardiac exam was  negative for murmurs rubs or gallops.His EKG showed normal sinus rhythm with an anteroseptal wall myocardial infarction and nonspecific ST and T wave changes. No acute changes were seen.The patient will remain on his current medication. He'll return in 4 months, or earlier if needed.We'll start the patient on pantoprazole because of his persistent gastroesophageal reflux disease.

## 2019-03-25 ENCOUNTER — MEDICATION RENEWAL (OUTPATIENT)
Age: 59
End: 2019-03-25

## 2019-03-28 ENCOUNTER — MEDICATION RENEWAL (OUTPATIENT)
Age: 59
End: 2019-03-28

## 2019-05-30 ENCOUNTER — APPOINTMENT (OUTPATIENT)
Dept: CARDIOLOGY | Facility: CLINIC | Age: 59
End: 2019-05-30
Payer: COMMERCIAL

## 2019-05-30 DIAGNOSIS — R07.89 OTHER CHEST PAIN: ICD-10-CM

## 2019-06-06 ENCOUNTER — APPOINTMENT (OUTPATIENT)
Dept: CARDIOLOGY | Facility: CLINIC | Age: 59
End: 2019-06-06
Payer: COMMERCIAL

## 2019-06-06 ENCOUNTER — NON-APPOINTMENT (OUTPATIENT)
Age: 59
End: 2019-06-06

## 2019-06-06 VITALS
SYSTOLIC BLOOD PRESSURE: 113 MMHG | DIASTOLIC BLOOD PRESSURE: 85 MMHG | OXYGEN SATURATION: 98 % | TEMPERATURE: 98.8 F | HEART RATE: 93 BPM | HEIGHT: 76 IN | WEIGHT: 264 LBS | BODY MASS INDEX: 32.15 KG/M2

## 2019-06-06 VITALS — SYSTOLIC BLOOD PRESSURE: 113 MMHG | DIASTOLIC BLOOD PRESSURE: 85 MMHG

## 2019-06-06 PROCEDURE — 93000 ELECTROCARDIOGRAM COMPLETE: CPT

## 2019-06-06 PROCEDURE — 99214 OFFICE O/P EST MOD 30 MIN: CPT

## 2019-06-06 RX ORDER — OSELTAMIVIR PHOSPHATE 75 MG/1
75 CAPSULE ORAL
Qty: 5 | Refills: 0 | Status: COMPLETED | COMMUNITY
Start: 2018-12-26

## 2019-06-06 RX ORDER — MOXIFLOXACIN HYDROCHLORIDE TABLETS, 400 MG 400 MG/1
400 TABLET, FILM COATED ORAL
Qty: 7 | Refills: 0 | Status: COMPLETED | COMMUNITY
Start: 2019-02-28

## 2019-06-06 RX ORDER — HYDROCODONE BITARTRATE AND HOMATROPINE METHYLBROMIDE 5; 1.5 MG/5ML; MG/5ML
5-1.5 SOLUTION ORAL
Qty: 240 | Refills: 0 | Status: COMPLETED | COMMUNITY
Start: 2019-05-15

## 2019-06-06 RX ORDER — BENZONATATE 200 MG/1
200 CAPSULE ORAL
Qty: 28 | Refills: 0 | Status: COMPLETED | COMMUNITY
Start: 2018-12-23

## 2019-06-06 RX ORDER — AMOXICILLIN AND CLAVULANATE POTASSIUM 875; 125 MG/1; MG/1
875-125 TABLET, COATED ORAL
Qty: 14 | Refills: 0 | Status: COMPLETED | COMMUNITY
Start: 2018-12-19

## 2019-06-06 NOTE — DISCUSSION/SUMMARY
[FreeTextEntry1] : The patient was examined. His blood pressure was 113/85. His pulse was 93. His lungs were clear to auscultation. Cardiac exam was  negative for murmurs rubs or gallops.His EKG showed normal sinus rhythm with an anteroseptal wall myocardial infarction and nonspecific ST and T wave changes. No acute changes were seen.The patient will remain on his current medication. He'll return in 4 months, or earlier if needed.

## 2019-06-06 NOTE — REASON FOR VISIT
[FreeTextEntry1] : The patient comes in for followup of his coronary artery disease, hypertension, chronic systolic heart failure, diabetes, hypercholesterolemia and he gets occasional chest discomforts that sometimes are relieved with burping. He gets dyspnea on exertion. He had a nuclear stress test which showed scar but no ischemia. This is the 4th  office visit since the patient had an anterior wall STEMI  on February 5, 2018. He went to the [facility] emergency room. He underwent emergent cardiac catheterization and had a stent placed and a large ramus artery. He was discharged 3 days later. He has not had chest pain since the hospital. \par He currently has an upper respiratory infection. He is trying to quit smoking. He feels short of breath when he lies down to sleep. He wakes up frequently at night did not want anything for sleep apnea. When he wakes up he goes and eats. He had some chest tingling but felt completely different than his heart attack discomfort.\par June 6, 2019: Patient has no new complaints. He denies any chest pains, shortness of breath, or palpitations. He is smoking to half and one pack per day. He may be moving to [place]. He intends to come up here periodically.

## 2019-06-06 NOTE — PHYSICAL EXAM
[General Appearance - Well Developed] : well developed [Normal Appearance] : normal appearance [Well Groomed] : well groomed [No Deformities] : no deformities [General Appearance - In No Acute Distress] : no acute distress [Normal Conjunctiva] : the conjunctiva exhibited no abnormalities [Eyelids - No Xanthelasma] : the eyelids demonstrated no xanthelasmas [Normal Oral Mucosa] : normal oral mucosa [No Oral Pallor] : no oral pallor [No Oral Cyanosis] : no oral cyanosis [Normal Jugular Venous A Waves Present] : normal jugular venous A waves present [Normal Jugular Venous V Waves Present] : normal jugular venous V waves present [No Jugular Venous Bloom A Waves] : no jugular venous bloom A waves [Respiration, Rhythm And Depth] : normal respiratory rhythm and effort [Exaggerated Use Of Accessory Muscles For Inspiration] : no accessory muscle use [Auscultation Breath Sounds / Voice Sounds] : lungs were clear to auscultation bilaterally [Heart Rate And Rhythm] : heart rate and rhythm were normal [Heart Sounds] : normal S1 and S2 [Murmurs] : no murmurs present [Abdomen Soft] : soft [Abdomen Tenderness] : non-tender [Abdomen Mass (___ Cm)] : no abdominal mass palpated [Gait - Sufficient For Exercise Testing] : the gait was sufficient for exercise testing [Abnormal Walk] : normal gait [Cyanosis, Localized] : no localized cyanosis [Nail Clubbing] : no clubbing of the fingernails [Petechial Hemorrhages (___cm)] : no petechial hemorrhages [Skin Color & Pigmentation] : normal skin color and pigmentation [] : no rash [No Venous Stasis] : no venous stasis [No Skin Ulcers] : no skin ulcer [Skin Lesions] : no skin lesions [No Xanthoma] : no  xanthoma was observed [Oriented To Time, Place, And Person] : oriented to person, place, and time [Mood] : the mood was normal [No Anxiety] : not feeling anxious [Affect] : the affect was normal [FreeTextEntry1] : obese

## 2019-09-20 ENCOUNTER — RX RENEWAL (OUTPATIENT)
Age: 59
End: 2019-09-20

## 2019-09-20 RX ORDER — PANTOPRAZOLE 40 MG/1
40 TABLET, DELAYED RELEASE ORAL
Qty: 30 | Refills: 5 | Status: ACTIVE | COMMUNITY
Start: 2019-02-14 | End: 1900-01-01

## 2019-10-10 ENCOUNTER — NON-APPOINTMENT (OUTPATIENT)
Age: 59
End: 2019-10-10

## 2019-10-10 ENCOUNTER — APPOINTMENT (OUTPATIENT)
Dept: CARDIOLOGY | Facility: CLINIC | Age: 59
End: 2019-10-10
Payer: COMMERCIAL

## 2019-10-10 VITALS
TEMPERATURE: 97.9 F | OXYGEN SATURATION: 97 % | RESPIRATION RATE: 17 BRPM | HEIGHT: 76 IN | BODY MASS INDEX: 32.76 KG/M2 | WEIGHT: 269 LBS | DIASTOLIC BLOOD PRESSURE: 74 MMHG | HEART RATE: 76 BPM | SYSTOLIC BLOOD PRESSURE: 110 MMHG

## 2019-10-10 PROCEDURE — 99214 OFFICE O/P EST MOD 30 MIN: CPT

## 2019-10-10 PROCEDURE — 93000 ELECTROCARDIOGRAM COMPLETE: CPT

## 2019-10-10 NOTE — REASON FOR VISIT
[FreeTextEntry1] : The patient comes in for followup of his coronary artery disease, hypertension, chronic systolic heart failure, diabetes, hypercholesterolemia and he gets occasional chest discomforts that sometimes are relieved with burping. He gets dyspnea on exertion. He had a nuclear stress test which showed scar but no ischemia. This is the 4th  office visit since the patient had an anterior wall STEMI  on February 5, 2018. He went to the [facility] emergency room. He underwent emergent cardiac catheterization and had a stent placed and a large ramus artery. He was discharged 3 days later. He has not had chest pain since the hospital. \par He currently has an upper respiratory infection. He is trying to quit smoking. He feels short of breath when he lies down to sleep. He wakes up frequently at night did not want anything for sleep apnea. When he wakes up he goes and eats. He had some chest tingling but felt completely different than his heart attack discomfort.\par June 6, 2019: Patient has no new complaints. He denies any chest pains, shortness of breath, or palpitations. He is smoking to half and one pack per day. He may be moving to [place]. He intends to come up here periodically.\par October 10, 2019: The patient complains when he is lying on his right side that he gets short of breath.  He also sometimes gets short of breath when lying on his back.  He is quit smoking gone back many times.  He cannot seem to quit permanently.  He denies any chest pains or palpitations.  He has never been to a pulmonologist even though his primary doctor had recommended it years ago.  He takes pro-air inhaler inhaler very irregularly and only as needed.

## 2019-10-10 NOTE — DISCUSSION/SUMMARY
[FreeTextEntry1] : The patient was examined. His blood pressure was 110/74. His pulse was 76. His lungs were clear to auscultation. Cardiac exam was  negative for murmurs rubs or gallops.His EKG showed normal sinus rhythm with an anteroseptal wall myocardial infarction and nonspecific ST and T wave changes. No acute changes were seen.The patient will remain on his current medication. He'll return in 4 months, or earlier if needed.  I recommended that he go see a pulmonologist because of decrease breath sounds at the right base posteriorly.

## 2019-10-10 NOTE — REVIEW OF SYSTEMS
[see HPI] : see HPI [Dyspnea on exertion] : dyspnea during exertion [Negative] : Heme/Lymph [Shortness Of Breath] : shortness of breath [Palpitations] : no palpitations [Chest Pain] : no chest pain

## 2019-10-10 NOTE — PHYSICAL EXAM
[General Appearance - Well Developed] : well developed [Normal Appearance] : normal appearance [Well Groomed] : well groomed [No Deformities] : no deformities [General Appearance - In No Acute Distress] : no acute distress [Normal Conjunctiva] : the conjunctiva exhibited no abnormalities [Eyelids - No Xanthelasma] : the eyelids demonstrated no xanthelasmas [Normal Oral Mucosa] : normal oral mucosa [No Oral Pallor] : no oral pallor [Normal Jugular Venous A Waves Present] : normal jugular venous A waves present [No Oral Cyanosis] : no oral cyanosis [Normal Jugular Venous V Waves Present] : normal jugular venous V waves present [No Jugular Venous Bloom A Waves] : no jugular venous bloom A waves [Exaggerated Use Of Accessory Muscles For Inspiration] : no accessory muscle use [Respiration, Rhythm And Depth] : normal respiratory rhythm and effort [Auscultation Breath Sounds / Voice Sounds] : lungs were clear to auscultation bilaterally [Heart Rate And Rhythm] : heart rate and rhythm were normal [Murmurs] : no murmurs present [Heart Sounds] : normal S1 and S2 [Abdomen Soft] : soft [Abdomen Tenderness] : non-tender [Abdomen Mass (___ Cm)] : no abdominal mass palpated [Abnormal Walk] : normal gait [Gait - Sufficient For Exercise Testing] : the gait was sufficient for exercise testing [Nail Clubbing] : no clubbing of the fingernails [Petechial Hemorrhages (___cm)] : no petechial hemorrhages [Cyanosis, Localized] : no localized cyanosis [Skin Color & Pigmentation] : normal skin color and pigmentation [No Venous Stasis] : no venous stasis [] : no rash [Skin Lesions] : no skin lesions [No Skin Ulcers] : no skin ulcer [No Xanthoma] : no  xanthoma was observed [Oriented To Time, Place, And Person] : oriented to person, place, and time [No Anxiety] : not feeling anxious [Mood] : the mood was normal [Affect] : the affect was normal [FreeTextEntry1] : obese

## 2019-10-16 NOTE — PATIENT PROFILE ADULT. - [facility]
Rescheduled patient to see Ms. Herrera since Ms. Wilson is no longer with the department. The patient agreed to time and date. Sent patient a reminder in the mail.   
(4) no limitation

## 2019-10-25 ENCOUNTER — APPOINTMENT (OUTPATIENT)
Dept: PULMONOLOGY | Facility: CLINIC | Age: 59
End: 2019-10-25
Payer: COMMERCIAL

## 2019-10-25 VITALS
TEMPERATURE: 98.5 F | SYSTOLIC BLOOD PRESSURE: 110 MMHG | OXYGEN SATURATION: 98 % | HEART RATE: 96 BPM | DIASTOLIC BLOOD PRESSURE: 69 MMHG

## 2019-10-25 DIAGNOSIS — Z78.9 OTHER SPECIFIED HEALTH STATUS: ICD-10-CM

## 2019-10-25 DIAGNOSIS — R09.89 OTHER SPECIFIED SYMPTOMS AND SIGNS INVOLVING THE CIRCULATORY AND RESPIRATORY SYSTEMS: ICD-10-CM

## 2019-10-25 LAB — POCT - HEMOGLOBIN (HGB), QUANTITATIVE, TRANSCUTANEOUS: 14.2

## 2019-10-25 PROCEDURE — 71046 X-RAY EXAM CHEST 2 VIEWS: CPT

## 2019-10-25 PROCEDURE — 94729 DIFFUSING CAPACITY: CPT

## 2019-10-25 PROCEDURE — 99245 OFF/OP CONSLTJ NEW/EST HI 55: CPT | Mod: 25

## 2019-10-25 PROCEDURE — 94060 EVALUATION OF WHEEZING: CPT

## 2019-10-25 PROCEDURE — G0008: CPT

## 2019-10-25 PROCEDURE — 88738 HGB QUANT TRANSCUTANEOUS: CPT

## 2019-10-25 PROCEDURE — 94727 GAS DIL/WSHOT DETER LNG VOL: CPT

## 2019-10-25 PROCEDURE — 90674 CCIIV4 VAC NO PRSV 0.5 ML IM: CPT

## 2019-10-25 NOTE — CONSULT LETTER
[Dear  ___] : Dear  [unfilled], [Please see my note below.] : Please see my note below. [Sincerely,] : Sincerely, [Consult Closing:] : Thank you very much for allowing me to participate in the care of this patient.  If you have any questions, please do not hesitate to contact me. [FreeTextEntry3] : Ferdinand Javier D.O., CHAPARRITA\par  of Medicine\par [facility] [employer]\par

## 2019-10-25 NOTE — HISTORY OF PRESENT ILLNESS
[Current] : is a current smoker [___ Pack Year History] : [unfilled] pack year history [Snoring] : snoring [Unintentional Sleep While Inactive] : unintentional sleep while inactive [Frequent Nocturnal Awakening] : no nocturnal awakening [Witnessed Apneas] : no witnessed sleep apnea [Awakes Unrefreshed] : does not awake unrefreshed [Awakes with Headache] : no headache upon awakening [FreeTextEntry1] : [place] consultation\par Abnormal lung exam\par History reviewed\par Patient admits to very mild shortness of breath dyspnea on exertion without deterioration\par Denies any active we have wheeze cough purulent sputum.\par No recent fevers chills sweats URI infections.\par Wife present at today's visit she states in the early winter he does develop an occasional wheeze currently not active as noted.\par Bronchitis in the past\par No history of pneumonia tuberculosis pulmonary embolic disease or obstructive sleep apnea\par Does not carry a diagnosis of COPD or emphysema\par Active tobacco smoker with a 44-year pack history states just did stop smoking this past week with placement of the nicotine patch\par Cardiac history\par ASHD\par History of PTA stents\par Chronic systolic dysfunction left ventricle\par Hypertension\par Hypercholesterolemia\par Diabetes mellitus\par \par

## 2019-10-25 NOTE — DISCUSSION/SUMMARY
[FreeTextEntry1] : Mild COPD without evidence of emphysema based on pulmonary physiology with a normal diffusion\par High clinical suspicion rule out obstructive sleep apnea\par Cardiac history as noted\par Recommendations\par Based on tobacco history and risk factors patient does qualify for low-dose CAT scan screening protocol.  Risks benefits of low-dose screening protocol discussed in detail with patient.\par All questions answered at today's visit.\par Formal sleep study home x2 nights\par Discussed risks benefits treatment protocols focusing primarily on nasal CPAP for obstructive sleep apnea.\par Continue smoking cessation with nicotine patch for which she is just started\par 1 month follow-up appointment

## 2019-10-25 NOTE — REASON FOR VISIT
[Initial Evaluation] : an initial evaluation [Shortness of Breath] : shortness of Breath [FreeTextEntry2] : Tobacco use

## 2019-10-25 NOTE — PHYSICAL EXAM
[Normal Appearance] : normal appearance [General Appearance - Well Developed] : well developed [General Appearance - Well Nourished] : well nourished [Well Groomed] : well groomed [Normal Conjunctiva] : the conjunctiva exhibited no abnormalities [No Deformities] : no deformities [General Appearance - In No Acute Distress] : no acute distress [Eyelids - No Xanthelasma] : the eyelids demonstrated no xanthelasmas [Enlarged Base of the Tongue] : enlargement of the base of the tongue [Neck Appearance] : the appearance of the neck was normal [IV] : IV [Jugular Venous Distention Increased] : there was no jugular-venous distention [Neck Cervical Mass (___cm)] : no neck mass was observed [Heart Rate And Rhythm] : heart rate and rhythm were normal [Thyroid Diffuse Enlargement] : the thyroid was not enlarged [Murmurs] : no murmurs present [Arterial Pulses Normal] : the arterial pulses were normal [Heart Sounds] : normal S1 and S2 [Edema] : no peripheral edema present [Veins - Varicosity Changes] : no varicosital changes were noted in the lower extremities [Exaggerated Use Of Accessory Muscles For Inspiration] : no accessory muscle use [Respiration, Rhythm And Depth] : normal respiratory rhythm and effort [Chest Palpation] : palpation of the chest revealed no abnormalities [Auscultation Breath Sounds / Voice Sounds] : lungs were clear to auscultation bilaterally [Lungs Percussion] : the lungs were normal to percussion [Bowel Sounds] : normal bowel sounds [Abdomen Soft] : soft [Abdomen Tenderness] : non-tender [Abdomen Mass (___ Cm)] : no abdominal mass palpated [Nail Clubbing] : no clubbing of the fingernails [Cyanosis, Localized] : no localized cyanosis [Petechial Hemorrhages (___cm)] : no petechial hemorrhages [Skin Color & Pigmentation] : normal skin color and pigmentation [Skin Turgor] : normal skin turgor [] : no rash [Deep Tendon Reflexes (DTR)] : deep tendon reflexes were 2+ and symmetric [Sensation] : the sensory exam was normal to light touch and pinprick [Motor Exam] : the motor exam was normal [No Focal Deficits] : no focal deficits [Impaired Insight] : insight and judgment were intact [Oriented To Time, Place, And Person] : oriented to person, place, and time [Affect] : the affect was normal [Mood] : the mood was normal [Normal Oropharynx] : abnormal oropharynx [FreeTextEntry1] : Glasses,  non icteric

## 2019-10-25 NOTE — PROCEDURE
[FreeTextEntry1] : Chest x-ray PA lateral October 25, 2019\par Borderline cardiomegaly\par Single-chamber AICD\par No evidence of pleural effusions parenchymal infiltrates dominant pulmonary nodules.\par Vickie mediastinum grossly normal.\par Soft tissue bony structures grossly normal.\par No evidence of cephalization\par Impression borderline cardiomegaly without evidence of active congestive heart failure or pleural effusions\par \par PFT October 25 2019\par Moderate reduction in flow rates\par No response to bronchodilator at the FEV1\par Mild obstructive pattern\par Air-trapping RV/TLC ratio 144% predicted.\par Diffusion normal 80% of predicted.\par Hemoglobin 14.2 \par Sawtooth flutter pattern noted predominantly on inspiratory limb of the flow volume loop

## 2019-10-25 NOTE — REVIEW OF SYSTEMS
[Sinus Problems] : sinus problems [Leg Cramps] : leg cramps [Diabetes] : diabetes mellitus [Negative] : Pulmonary Hypertension [Fever] : no fever [Chills] : no chills [Fatigue] : no fatigue [Poor Appetite] : normal appetite  [Recent Wt Loss (___ Lbs)] : no recent weight loss [Recent Wt Gain (___ Lbs)] : no recent weight gain [Chest Discomfort] : no chest discomfort [Orthopnea] : no orthopnea [Palpitations] : no palpitations [Edema] : ~T edema was not present [Claudication] : no intermittent claudication [Thyroid Problem] : no thyroid problem [FreeTextEntry9] : Coronary artery disease history of MI chronic systolic LV dysfunction, hypertension, stents x2 [de-identified] : Dermatitis elbows [FreeTextEntry7] : History Crohn's colitis irritable bowel disease

## 2019-11-07 VITALS — WEIGHT: 270 LBS | HEIGHT: 77 IN | BODY MASS INDEX: 31.88 KG/M2

## 2019-11-07 NOTE — PLAN
[Smoking Cessation Guidance Provided] : Smoking cessation guidance was provided to patient [Smoking Cessation] : smoking cessation [Regular follow-up with healthcare provider] : regular follow-up with healthcare provider [Age appropriate screenings] : Age appropriate screenings [FreeTextEntry1] : HIs LDCT is scheduled for 11/11/19 at the [facility] location.

## 2019-11-07 NOTE — HISTORY OF PRESENT ILLNESS
[TextBox_13] : He denies hemoptysis, denies new cough, denies unexplained weight loss. He is a current smoker with a 44 pack year smoking history (1PPD x 44 years).  He started nocttine patch about 2 weeks ago and has cut down to 3-4 ciggs/day).

## 2019-11-07 NOTE — REASON FOR VISIT
[Initial Evaluation] : an initial evaluation visit [Low-Dose CT Screening Discussion] : low-dose CT lung cancer screening discussion [Virtual Visit] : virtual visit

## 2019-11-07 NOTE — ASSESSMENT
[Discussed Cessation Medication] : cessation medication was discussed [Discussed Risks and Advised to Quit Smoking] : Discussed risks and advised to quit smoking [Discussed Cessation Strategies] : cessation strategies were discussed [Not Ready] : Patient is not ready for cessation intervention [Contemplation] : Contemplation: patient is considering quitting within the next 6 months, patient did not attempt to quit in the last year

## 2019-11-10 ENCOUNTER — FORM ENCOUNTER (OUTPATIENT)
Age: 59
End: 2019-11-10

## 2019-11-11 ENCOUNTER — OUTPATIENT (OUTPATIENT)
Dept: OUTPATIENT SERVICES | Facility: HOSPITAL | Age: 59
LOS: 1 days | End: 2019-11-11
Payer: COMMERCIAL

## 2019-11-11 ENCOUNTER — APPOINTMENT (OUTPATIENT)
Dept: CT IMAGING | Facility: IMAGING CENTER | Age: 59
End: 2019-11-11
Payer: COMMERCIAL

## 2019-11-11 DIAGNOSIS — G47.30 SLEEP APNEA, UNSPECIFIED: ICD-10-CM

## 2019-11-11 DIAGNOSIS — Z72.0 TOBACCO USE: ICD-10-CM

## 2019-11-11 PROCEDURE — G0297: CPT

## 2019-11-11 PROCEDURE — G0297: CPT | Mod: 26

## 2019-11-12 ENCOUNTER — CLINICAL ADVICE (OUTPATIENT)
Age: 59
End: 2019-11-12

## 2019-11-14 ENCOUNTER — FORM ENCOUNTER (OUTPATIENT)
Age: 59
End: 2019-11-14

## 2019-11-15 ENCOUNTER — APPOINTMENT (OUTPATIENT)
Dept: PULMONOLOGY | Facility: CLINIC | Age: 59
End: 2019-11-15
Payer: COMMERCIAL

## 2019-11-15 PROCEDURE — 95800 SLP STDY UNATTENDED: CPT

## 2019-12-04 ENCOUNTER — APPOINTMENT (OUTPATIENT)
Dept: PULMONOLOGY | Facility: CLINIC | Age: 59
End: 2019-12-04
Payer: COMMERCIAL

## 2019-12-04 VITALS
TEMPERATURE: 98.8 F | SYSTOLIC BLOOD PRESSURE: 106 MMHG | DIASTOLIC BLOOD PRESSURE: 71 MMHG | OXYGEN SATURATION: 97 % | HEART RATE: 99 BPM

## 2019-12-04 DIAGNOSIS — R06.2 WHEEZING: ICD-10-CM

## 2019-12-04 PROCEDURE — 94060 EVALUATION OF WHEEZING: CPT

## 2019-12-04 PROCEDURE — 99214 OFFICE O/P EST MOD 30 MIN: CPT | Mod: 25

## 2019-12-04 NOTE — DISCUSSION/SUMMARY
[FreeTextEntry1] : Asthmatic bronchitis COPD flare-at samples with Trelegy Ellipta 1 puff daily\par PRN Pro Air\par \par Obstructive sleep apnea with AHI 13\par Start auto CPAP settings 6-16 cm H2O with mask fit heated tubing\par 1 month follow-up\par Patient advised to bring CPAP device for interrogation and data compliance review\par \par Mild COPD without evidence of emphysema based on pulmonary physiology with a normal diffusion\par High clinical suspicion rule out obstructive sleep apnea\par Cardiac history as noted\par Recommendations\par Based on tobacco history and risk factors patient does qualify for low-dose CAT scan screening protocol.  Risks benefits of low-dose screening protocol discussed in detail with patient.\par All questions answered at today's visit.\par Formal sleep study home x2 nights\par Discussed risks benefits treatment protocols focusing primarily on nasal CPAP for obstructive sleep apnea.\par Continue smoking cessation with nicotine patch for which he  is just started\par 1 month follow-up appointment

## 2019-12-04 NOTE — REVIEW OF SYSTEMS
[Sinus Problems] : sinus problems [Leg Cramps] : leg cramps [Diabetes] : diabetes mellitus [Negative] : Pulmonary Hypertension [Chills] : no chills [Fever] : no fever [Fatigue] : no fatigue [Poor Appetite] : normal appetite  [Recent Wt Gain (___ Lbs)] : no recent weight gain [Recent Wt Loss (___ Lbs)] : no recent weight loss [Orthopnea] : no orthopnea [Chest Discomfort] : no chest discomfort [Palpitations] : no palpitations [Edema] : ~T edema was not present [Claudication] : no intermittent claudication [FreeTextEntry9] : Coronary artery disease history of MI chronic systolic LV dysfunction, hypertension, stents x2 [Thyroid Problem] : no thyroid problem [de-identified] : Dermatitis elbows [FreeTextEntry7] : History Crohn's colitis irritable bowel disease

## 2019-12-04 NOTE — PHYSICAL EXAM
[General Appearance - Well Developed] : well developed [Normal Appearance] : normal appearance [General Appearance - Well Nourished] : well nourished [Well Groomed] : well groomed [No Deformities] : no deformities [General Appearance - In No Acute Distress] : no acute distress [Normal Conjunctiva] : the conjunctiva exhibited no abnormalities [Eyelids - No Xanthelasma] : the eyelids demonstrated no xanthelasmas [Enlarged Base of the Tongue] : enlargement of the base of the tongue [IV] : IV [Neck Appearance] : the appearance of the neck was normal [Neck Cervical Mass (___cm)] : no neck mass was observed [Jugular Venous Distention Increased] : there was no jugular-venous distention [Thyroid Diffuse Enlargement] : the thyroid was not enlarged [Murmurs] : no murmurs present [Heart Sounds] : normal S1 and S2 [Heart Rate And Rhythm] : heart rate and rhythm were normal [Veins - Varicosity Changes] : no varicosital changes were noted in the lower extremities [Arterial Pulses Normal] : the arterial pulses were normal [Edema] : no peripheral edema present [Auscultation Breath Sounds / Voice Sounds] : lungs were clear to auscultation bilaterally [Exaggerated Use Of Accessory Muscles For Inspiration] : no accessory muscle use [Respiration, Rhythm And Depth] : normal respiratory rhythm and effort [Chest Palpation] : palpation of the chest revealed no abnormalities [Bowel Sounds] : normal bowel sounds [Lungs Percussion] : the lungs were normal to percussion [Abdomen Tenderness] : non-tender [Abdomen Soft] : soft [Nail Clubbing] : no clubbing of the fingernails [Abdomen Mass (___ Cm)] : no abdominal mass palpated [Cyanosis, Localized] : no localized cyanosis [] : no ischemic changes [Petechial Hemorrhages (___cm)] : no petechial hemorrhages [Sensation] : the sensory exam was normal to light touch and pinprick [Motor Exam] : the motor exam was normal [Deep Tendon Reflexes (DTR)] : deep tendon reflexes were 2+ and symmetric [No Focal Deficits] : no focal deficits [Oriented To Time, Place, And Person] : oriented to person, place, and time [Impaired Insight] : insight and judgment were intact [Affect] : the affect was normal [Mood] : the mood was normal [Normal Oropharynx] : abnormal oropharynx [FreeTextEntry1] : Glasses,  non icteric

## 2019-12-04 NOTE — HISTORY OF PRESENT ILLNESS
[Obstructive Sleep Apnea] : obstructive sleep apnea [Date: ___] : Date of most recent diagnostic polysomnogram: [unfilled] [AHI: ___ per hour] : Apnea-hypopnea index:  [unfilled] per hour [FreeTextEntry1] : Overall not doing so well. Coughing, occasional wheeze, and SOB. Using Pro-Air which offers some relief.

## 2019-12-04 NOTE — PROCEDURE
[FreeTextEntry1] : Spirometry December 4, 2019\par Significant reduction in flow rates with FEV1 52% predicted.\par Obstructive pattern\par 9% response which is borderline to bronchodilator at the FVC\par No bronchodilator response at FEV1\par Inspiratory limb demonstrates a sawtooth pattern\par \par Sleep study mild obstructive sleep apnea\par AHI 13\par [facility] Sleep Study Report\par PHYSICIAN INTERPRETATION AND COMMENTS: Findings are consistent with mild, positional obstructive sleep\par apnea (LUIS ALFREDO).\par CLINICAL HISTORY: 59 year old male presented with: 18.5 inch neck, BMI of 34, an [place] sleepiness score of 5,\par history of heart disease, diabetes and symptoms of nocturnal waking up choking and witnessed apneas. Based on the clinical\par history, the patient has a high pre-test probability of having severe LUIS ALFREDO. \par SLEEP STUDY FINDINGS: Patient underwent a one night Home Sleep Test and by behavioral criteria, slept for\par approximately 9.6 hours, with a sleep latency of 3 minutes and a sleep efficiency of 72.4%. Mild sleep disordered breathing\par (AHI=13) is noted based on a 4% hypopnea desaturation criteria, predominantly in the supine position (16 events/hour). The\par patient slept supine 58.5% of the night based on valid recording time of 9.55 hours and is 2 times as likely to have\par apneas/hypopneas when supine. When considering more subtle measures of sleep disordered breathing, the overall respiratory\par disturbance index is moderate (RDI=25) based on a 1% hypopnea desaturation criteria with confirmation by surrogate arousal\par indicators. The apneas/hypopneas are accompanied by minimal oxygen desaturation (percent time below 90% SpO2: 0.6%, Min\par SpO2: 85.8%). The average desaturation across all sleep disordered breathing events is 3.0%. Snoring occurs for 13.5% (30\par dB) of the study. The mean pulse rate is 96 BPM, with frequent pulse rate variability (54 events with >= 6 BPM\par increase/decrease per hour). \par TREATMENT CONSIDERATIONS: For a patient with mild LUIS ALFREDO, nasal continuous positive airway pressure (CPAP)\par therapy or alternative therapies for treatment should be considered, i.e., Mandibular advancement splint (MAS), referral to an\par ENT for surgical modifications to the airway, and/or weight loss or behavioral therapy to reduce the potential risk of daytime\par somnolence, hypertension, cardiovascular disease, stroke and diabetes. The patient should avoid sleeping supine; the non-supine\par RDI is 2.8 times less severe than the supine RDI.\par DISEASE MANAGEMENT CONSIDERATIONS: Sleeping pills, sedatives, alcohol, narcotics, and sleep deprivation may\par worsen LUIS ALFREDO.\par Study Review: The raw data of this CHAY study has been reviewed, with the report confirmed and electronically signed by Mirza\clifford Dempsey M.D. FACP, FCCP, [facility] Diplomate in Sleep Medicine, [facility]. Caution:The diagnosis of the Obstructive Sleep Apnea\par Syndrome must be based on all available clinical data, of which this study is only a part. Thus final diagnosis and treatment\par recommendations should include information from an examination of the patient by a knowledgeable healthcare provider.\par *Average number of apneas and hypopneas (4%) per hour of valid recording time (Note: CMS RDI; AAS MIKE) \par CHAY Traceability: (SN: [id number] ) [id number]; N/A; \par [facility] Sleep Study Report\par Patient Name Rui Jessica Study Ordered by Ferdinand Javier\par Date of Night 1 11/15/2019 Date of Birth 1960\par Identification Number [id number]\par Overall AHI* Overall RDI % time < 90% SpO2 Mean SpO2 % time snoring > 30 dB \par 13 25 0.6 % 94.4 % 13.5 %\par Signature: Date: 11- 07:37:41 EST\par \par \par Chest x-ray PA lateral October 25, 2019\par Borderline cardiomegaly\par Single-chamber AICD\par No evidence of pleural effusions parenchymal infiltrates dominant pulmonary nodules.\par Vickie mediastinum grossly normal.\par Soft tissue bony structures grossly normal.\par No evidence of cephalization\par Impression borderline cardiomegaly without evidence of active congestive heart failure or pleural effusions\par \par PFT October 25 2019\par Moderate reduction in flow rates\par No response to bronchodilator at the FEV1\par Mild obstructive pattern\par Air-trapping RV/TLC ratio 144% predicted.\par Diffusion normal 80% of predicted.\par Hemoglobin 14.2 \par Sawtooth flutter pattern noted predominantly on inspiratory limb of the flow volume loop\par \par

## 2019-12-15 ENCOUNTER — INPATIENT (INPATIENT)
Facility: HOSPITAL | Age: 59
LOS: 4 days | Discharge: HOME | DRG: 246 | End: 2019-12-20
Attending: INTERNAL MEDICINE | Admitting: INTERNAL MEDICINE
Payer: MEDICARE

## 2019-12-15 VITALS
HEIGHT: 77 IN | TEMPERATURE: 98 F | OXYGEN SATURATION: 98 % | HEART RATE: 93 BPM | DIASTOLIC BLOOD PRESSURE: 79 MMHG | RESPIRATION RATE: 22 BRPM | SYSTOLIC BLOOD PRESSURE: 119 MMHG | WEIGHT: 270.07 LBS

## 2019-12-15 DIAGNOSIS — I25.10 ATHEROSCLEROTIC HEART DISEASE OF NATIVE CORONARY ARTERY WITHOUT ANGINA PECTORIS: ICD-10-CM

## 2019-12-15 DIAGNOSIS — N40.0 BENIGN PROSTATIC HYPERPLASIA WITHOUT LOWER URINARY TRACT SYMPTOMS: ICD-10-CM

## 2019-12-15 DIAGNOSIS — E11.9 TYPE 2 DIABETES MELLITUS WITHOUT COMPLICATIONS: ICD-10-CM

## 2019-12-15 DIAGNOSIS — J44.9 CHRONIC OBSTRUCTIVE PULMONARY DISEASE, UNSPECIFIED: ICD-10-CM

## 2019-12-15 DIAGNOSIS — J81.1 CHRONIC PULMONARY EDEMA: ICD-10-CM

## 2019-12-15 DIAGNOSIS — I50.43 ACUTE ON CHRONIC COMBINED SYSTOLIC (CONGESTIVE) AND DIASTOLIC (CONGESTIVE) HEART FAILURE: ICD-10-CM

## 2019-12-15 DIAGNOSIS — Z29.9 ENCOUNTER FOR PROPHYLACTIC MEASURES, UNSPECIFIED: ICD-10-CM

## 2019-12-15 LAB
ALBUMIN SERPL ELPH-MCNC: 4 G/DL — SIGNIFICANT CHANGE UP (ref 3.3–5)
ALP SERPL-CCNC: 67 U/L — SIGNIFICANT CHANGE UP (ref 40–120)
ALT FLD-CCNC: 35 U/L — SIGNIFICANT CHANGE UP (ref 10–45)
ANION GAP SERPL CALC-SCNC: 13 MMOL/L — SIGNIFICANT CHANGE UP (ref 5–17)
APTT BLD: 31.9 SEC — SIGNIFICANT CHANGE UP (ref 27.5–36.3)
AST SERPL-CCNC: 24 U/L — SIGNIFICANT CHANGE UP (ref 10–40)
BASOPHILS # BLD AUTO: 0.05 K/UL — SIGNIFICANT CHANGE UP (ref 0–0.2)
BASOPHILS NFR BLD AUTO: 0.6 % — SIGNIFICANT CHANGE UP (ref 0–2)
BILIRUB SERPL-MCNC: 0.3 MG/DL — SIGNIFICANT CHANGE UP (ref 0.2–1.2)
BUN SERPL-MCNC: 26 MG/DL — HIGH (ref 7–23)
CALCIUM SERPL-MCNC: 9.7 MG/DL — SIGNIFICANT CHANGE UP (ref 8.4–10.5)
CHLORIDE SERPL-SCNC: 105 MMOL/L — SIGNIFICANT CHANGE UP (ref 96–108)
CO2 SERPL-SCNC: 20 MMOL/L — LOW (ref 22–31)
CREAT SERPL-MCNC: 1.75 MG/DL — HIGH (ref 0.5–1.3)
D DIMER BLD IA.RAPID-MCNC: 285 NG/ML DDU — HIGH
EOSINOPHIL # BLD AUTO: 0.18 K/UL — SIGNIFICANT CHANGE UP (ref 0–0.5)
EOSINOPHIL NFR BLD AUTO: 2.3 % — SIGNIFICANT CHANGE UP (ref 0–6)
GLUCOSE BLDC GLUCOMTR-MCNC: 219 MG/DL — HIGH (ref 70–99)
GLUCOSE SERPL-MCNC: 126 MG/DL — HIGH (ref 70–99)
HCT VFR BLD CALC: 39.5 % — SIGNIFICANT CHANGE UP (ref 39–50)
HGB BLD-MCNC: 12.2 G/DL — LOW (ref 13–17)
IMM GRANULOCYTES NFR BLD AUTO: 1.2 % — SIGNIFICANT CHANGE UP (ref 0–1.5)
INR BLD: 1.07 RATIO — SIGNIFICANT CHANGE UP (ref 0.88–1.16)
LYMPHOCYTES # BLD AUTO: 1.47 K/UL — SIGNIFICANT CHANGE UP (ref 1–3.3)
LYMPHOCYTES # BLD AUTO: 19 % — SIGNIFICANT CHANGE UP (ref 13–44)
MCHC RBC-ENTMCNC: 25.4 PG — LOW (ref 27–34)
MCHC RBC-ENTMCNC: 30.9 GM/DL — LOW (ref 32–36)
MCV RBC AUTO: 82.3 FL — SIGNIFICANT CHANGE UP (ref 80–100)
MONOCYTES # BLD AUTO: 0.52 K/UL — SIGNIFICANT CHANGE UP (ref 0–0.9)
MONOCYTES NFR BLD AUTO: 6.7 % — SIGNIFICANT CHANGE UP (ref 2–14)
NEUTROPHILS # BLD AUTO: 5.42 K/UL — SIGNIFICANT CHANGE UP (ref 1.8–7.4)
NEUTROPHILS NFR BLD AUTO: 70.2 % — SIGNIFICANT CHANGE UP (ref 43–77)
NRBC # BLD: 0 /100 WBCS — SIGNIFICANT CHANGE UP (ref 0–0)
NT-PROBNP SERPL-SCNC: 432 PG/ML — HIGH (ref 0–300)
PLATELET # BLD AUTO: 200 K/UL — SIGNIFICANT CHANGE UP (ref 150–400)
POTASSIUM SERPL-MCNC: 3.9 MMOL/L — SIGNIFICANT CHANGE UP (ref 3.5–5.3)
POTASSIUM SERPL-SCNC: 3.9 MMOL/L — SIGNIFICANT CHANGE UP (ref 3.5–5.3)
PROT SERPL-MCNC: 7.5 G/DL — SIGNIFICANT CHANGE UP (ref 6–8.3)
PROTHROM AB SERPL-ACNC: 12.2 SEC — SIGNIFICANT CHANGE UP (ref 10–12.9)
RBC # BLD: 4.8 M/UL — SIGNIFICANT CHANGE UP (ref 4.2–5.8)
RBC # FLD: 15.4 % — HIGH (ref 10.3–14.5)
SODIUM SERPL-SCNC: 138 MMOL/L — SIGNIFICANT CHANGE UP (ref 135–145)
TROPONIN T, HIGH SENSITIVITY RESULT: 32 NG/L — SIGNIFICANT CHANGE UP (ref 0–51)
TROPONIN T, HIGH SENSITIVITY RESULT: 33 NG/L — SIGNIFICANT CHANGE UP (ref 0–51)
WBC # BLD: 7.73 K/UL — SIGNIFICANT CHANGE UP (ref 3.8–10.5)
WBC # FLD AUTO: 7.73 K/UL — SIGNIFICANT CHANGE UP (ref 3.8–10.5)

## 2019-12-15 PROCEDURE — 99223 1ST HOSP IP/OBS HIGH 75: CPT

## 2019-12-15 PROCEDURE — 71275 CT ANGIOGRAPHY CHEST: CPT | Mod: 26

## 2019-12-15 PROCEDURE — 71046 X-RAY EXAM CHEST 2 VIEWS: CPT | Mod: 26

## 2019-12-15 PROCEDURE — 93010 ELECTROCARDIOGRAM REPORT: CPT

## 2019-12-15 PROCEDURE — 99285 EMERGENCY DEPT VISIT HI MDM: CPT

## 2019-12-15 RX ORDER — INSULIN LISPRO 100/ML
12 VIAL (ML) SUBCUTANEOUS
Refills: 0 | Status: DISCONTINUED | OUTPATIENT
Start: 2019-12-15 | End: 2019-12-16

## 2019-12-15 RX ORDER — IPRATROPIUM/ALBUTEROL SULFATE 18-103MCG
3 AEROSOL WITH ADAPTER (GRAM) INHALATION ONCE
Refills: 0 | Status: COMPLETED | OUTPATIENT
Start: 2019-12-15 | End: 2019-12-15

## 2019-12-15 RX ORDER — DEXTROSE 50 % IN WATER 50 %
15 SYRINGE (ML) INTRAVENOUS ONCE
Refills: 0 | Status: DISCONTINUED | OUTPATIENT
Start: 2019-12-15 | End: 2019-12-20

## 2019-12-15 RX ORDER — BUDESONIDE AND FORMOTEROL FUMARATE DIHYDRATE 160; 4.5 UG/1; UG/1
2 AEROSOL RESPIRATORY (INHALATION)
Refills: 0 | Status: DISCONTINUED | OUTPATIENT
Start: 2019-12-15 | End: 2019-12-20

## 2019-12-15 RX ORDER — ASPIRIN/CALCIUM CARB/MAGNESIUM 324 MG
81 TABLET ORAL DAILY
Refills: 0 | Status: DISCONTINUED | OUTPATIENT
Start: 2019-12-15 | End: 2019-12-20

## 2019-12-15 RX ORDER — FUROSEMIDE 40 MG
20 TABLET ORAL ONCE
Refills: 0 | Status: COMPLETED | OUTPATIENT
Start: 2019-12-15 | End: 2019-12-15

## 2019-12-15 RX ORDER — INSULIN LISPRO 100/ML
VIAL (ML) SUBCUTANEOUS AT BEDTIME
Refills: 0 | Status: DISCONTINUED | OUTPATIENT
Start: 2019-12-15 | End: 2019-12-20

## 2019-12-15 RX ORDER — TICAGRELOR 90 MG/1
90 TABLET ORAL EVERY 12 HOURS
Refills: 0 | Status: DISCONTINUED | OUTPATIENT
Start: 2019-12-15 | End: 2019-12-19

## 2019-12-15 RX ORDER — TAMSULOSIN HYDROCHLORIDE 0.4 MG/1
0.4 CAPSULE ORAL AT BEDTIME
Refills: 0 | Status: DISCONTINUED | OUTPATIENT
Start: 2019-12-15 | End: 2019-12-20

## 2019-12-15 RX ORDER — LISINOPRIL 2.5 MG/1
5 TABLET ORAL DAILY
Refills: 0 | Status: DISCONTINUED | OUTPATIENT
Start: 2019-12-15 | End: 2019-12-16

## 2019-12-15 RX ORDER — FUROSEMIDE 40 MG
20 TABLET ORAL DAILY
Refills: 0 | Status: DISCONTINUED | OUTPATIENT
Start: 2019-12-16 | End: 2019-12-19

## 2019-12-15 RX ORDER — GLUCAGON INJECTION, SOLUTION 0.5 MG/.1ML
1 INJECTION, SOLUTION SUBCUTANEOUS ONCE
Refills: 0 | Status: DISCONTINUED | OUTPATIENT
Start: 2019-12-15 | End: 2019-12-20

## 2019-12-15 RX ORDER — SPIRONOLACTONE 25 MG/1
25 TABLET, FILM COATED ORAL DAILY
Refills: 0 | Status: DISCONTINUED | OUTPATIENT
Start: 2019-12-15 | End: 2019-12-20

## 2019-12-15 RX ORDER — TIOTROPIUM BROMIDE 18 UG/1
1 CAPSULE ORAL; RESPIRATORY (INHALATION) DAILY
Refills: 0 | Status: DISCONTINUED | OUTPATIENT
Start: 2019-12-15 | End: 2019-12-20

## 2019-12-15 RX ORDER — NICOTINE POLACRILEX 2 MG
1 GUM BUCCAL DAILY
Refills: 0 | Status: DISCONTINUED | OUTPATIENT
Start: 2019-12-15 | End: 2019-12-20

## 2019-12-15 RX ORDER — INSULIN LISPRO 100/ML
VIAL (ML) SUBCUTANEOUS
Refills: 0 | Status: DISCONTINUED | OUTPATIENT
Start: 2019-12-15 | End: 2019-12-20

## 2019-12-15 RX ORDER — ATORVASTATIN CALCIUM 80 MG/1
40 TABLET, FILM COATED ORAL AT BEDTIME
Refills: 0 | Status: DISCONTINUED | OUTPATIENT
Start: 2019-12-15 | End: 2019-12-20

## 2019-12-15 RX ORDER — METOPROLOL TARTRATE 50 MG
50 TABLET ORAL DAILY
Refills: 0 | Status: DISCONTINUED | OUTPATIENT
Start: 2019-12-15 | End: 2019-12-20

## 2019-12-15 RX ORDER — SODIUM CHLORIDE 9 MG/ML
1000 INJECTION, SOLUTION INTRAVENOUS
Refills: 0 | Status: DISCONTINUED | OUTPATIENT
Start: 2019-12-15 | End: 2019-12-20

## 2019-12-15 RX ORDER — DEXTROSE 50 % IN WATER 50 %
12.5 SYRINGE (ML) INTRAVENOUS ONCE
Refills: 0 | Status: DISCONTINUED | OUTPATIENT
Start: 2019-12-15 | End: 2019-12-20

## 2019-12-15 RX ORDER — DEXTROSE 50 % IN WATER 50 %
25 SYRINGE (ML) INTRAVENOUS ONCE
Refills: 0 | Status: DISCONTINUED | OUTPATIENT
Start: 2019-12-15 | End: 2019-12-20

## 2019-12-15 RX ORDER — ACETAMINOPHEN 500 MG
650 TABLET ORAL EVERY 4 HOURS
Refills: 0 | Status: DISCONTINUED | OUTPATIENT
Start: 2019-12-15 | End: 2019-12-20

## 2019-12-15 RX ORDER — INSULIN GLARGINE 100 [IU]/ML
36 INJECTION, SOLUTION SUBCUTANEOUS AT BEDTIME
Refills: 0 | Status: DISCONTINUED | OUTPATIENT
Start: 2019-12-15 | End: 2019-12-16

## 2019-12-15 RX ORDER — IPRATROPIUM/ALBUTEROL SULFATE 18-103MCG
3 AEROSOL WITH ADAPTER (GRAM) INHALATION EVERY 6 HOURS
Refills: 0 | Status: DISCONTINUED | OUTPATIENT
Start: 2019-12-15 | End: 2019-12-20

## 2019-12-15 RX ORDER — HEPARIN SODIUM 5000 [USP'U]/ML
5000 INJECTION INTRAVENOUS; SUBCUTANEOUS EVERY 12 HOURS
Refills: 0 | Status: DISCONTINUED | OUTPATIENT
Start: 2019-12-15 | End: 2019-12-18

## 2019-12-15 RX ADMIN — Medication 3 MILLILITER(S): at 19:18

## 2019-12-15 RX ADMIN — INSULIN GLARGINE 36 UNIT(S): 100 INJECTION, SOLUTION SUBCUTANEOUS at 23:31

## 2019-12-15 RX ADMIN — Medication 125 MILLIGRAM(S): at 19:19

## 2019-12-15 RX ADMIN — Medication 12 UNIT(S): at 23:32

## 2019-12-15 RX ADMIN — Medication 20 MILLIGRAM(S): at 20:21

## 2019-12-15 NOTE — H&P ADULT - PROBLEM SELECTOR PLAN 3
less suspicious for COPD exacerbation   - Eda prn   - trelegy therapeutic equivalent ( budesonide and tiotropium)

## 2019-12-15 NOTE — H&P ADULT - NSHPPHYSICALEXAM_GEN_ALL_CORE
Vital Signs Last 24 Hrs  T(C): 36.7 (15 Dec 2019 18:45), Max: 36.7 (15 Dec 2019 18:45)  T(F): 98 (15 Dec 2019 18:45), Max: 98 (15 Dec 2019 18:45)  HR: 97 (15 Dec 2019 20:17) (87 - 97)  BP: 130/82 (15 Dec 2019 20:17) (117/74 - 130/82)  BP(mean): --  RR: 20 (15 Dec 2019 20:17) (20 - 22)  SpO2: 97% (15 Dec 2019 20:17) (97% - 98%) Vital Signs Last 24 Hrs  T(C): 36.7 (15 Dec 2019 18:45), Max: 36.7 (15 Dec 2019 18:45)  T(F): 98 (15 Dec 2019 18:45), Max: 98 (15 Dec 2019 18:45)  HR: 97 (15 Dec 2019 20:17) (87 - 97)  BP: 130/82 (15 Dec 2019 20:17) (117/74 - 130/82)  BP(mean): --  RR: 20 (15 Dec 2019 20:17) (20 - 22)  SpO2: 97% (15 Dec 2019 20:17) (97% - 98%)    GENERAL:  mild respiratory  distress on RA , breathing regular non labored, well-developed, central obesity   HEAD:  Atraumatic, Normocephalic  ENT: EOMI, PERRLA, conjunctiva and sclera clear,  moist mucosa  mild  pharyngeal erythema no exudates   NECK: supple , no JVD   CHEST/LUNG: Clear to auscultation bilaterally prolonged expiratory phase ; No wheeze, equal breath sounds bilaterally   BACK: No spinal tenderness,  No CVA tenderness   HEART: Regular rate and rhythm; No murmurs, rubs, or gallops  ABDOMEN: Soft, Nontender, Nondistended; Bowel sounds present  EXTREMITIES:  No clubbing, cyanosis, +1 pitting  edema b/l   MSK: No joint swelling or effusions, ROM intact   PSYCH: Normal behavior/affect  NEUROLOGY: AAOx3, non-focal, cranial nerves intact  SKIN: Normal color, No rashes or lesions

## 2019-12-15 NOTE — H&P ADULT - HISTORY OF PRESENT ILLNESS
Patient is a 60 y/o male w/pmh T2DM, HTN, CAD/MI s/p stents (most recent 2/2018), HFrEF, extensive smoking history quit about two weeks ago, newly diagnosed COPD and currently on trelegy, presents for worsened dyspnea on exertion over the past two weeks.   Patient reports , shortness of breath after a few steps . He also reports one episode of chest pain on day of admission, which occurred while laying down, pressure like, located in the substernal region , centrally located , lasted a few seconds. Patient is a 60 y/o male w/pmh T2DM, HTN, CAD/MI s/p stents (most recent 2/2018), HFrEF, extensive smoking history quit about two weeks ago, newly diagnosed COPD and currently on trelegy, presents for worsened dyspnea on exertion over the past two weeks.   Patient reports , shortness of breath after a few steps, he was previously able to walk about a block . He was evaluated by pulmonary and diagnosed with mild COPD and started on Trelegy , however symptoms continued to worsen. He reports mild lower extremity edema . He reports increased orthopnea , but no PND episodes. He reports a non productive cough , no wheezing. He has no fever or chills , no flu-like symptoms. on day of admission he had one episode of chest pain  which occurred while laying down after coughing, pressure like, located in the substernal region , centrally located , lasted a few seconds and self resolved.

## 2019-12-15 NOTE — ED ADULT NURSE NOTE - OBJECTIVE STATEMENT
Patient is a 60 y/o male c/o SOB. States he develops SOB with minimal exertion. Former smoker, quit a few days after SOB began and former . States SOB begun 2 weeks ago and has gotten progressively worse. States he saw pulmonologist a few days after SOB began and diagnosed him with COPD, started him on trilogy. Pt. is in tripod position. States he came to ED last night but decided to leave before being seen. States he developed a dry cough a few days ago. Hx. DM taking insulin and 2 heart attacks. Denies CP, N/V/D, numbness, tingling, fever, chills, headache, lightheadedness. A&Ox3. Breathing labored and spontaneously. Abdomen soft, nontender and nondistended. Skin dry and intact. Safety and comfort measures provided. Family at bedside.

## 2019-12-15 NOTE — H&P ADULT - NSHPLABSRESULTS_GEN_ALL_CORE
Labs personally reviewed:                          12.2   7.73  )-----------( 200      ( 15 Dec 2019 18:45 )             39.5     12-15    138  |  105  |  26<H>  ----------------------------<  126<H>  3.9   |  20<L>  |  1.75<H>    Ca    9.7      15 Dec 2019 18:45    TPro  7.5  /  Alb  4.0  /  TBili  0.3  /  DBili  x   /  AST  24  /  ALT  35  /  AlkPhos  67  12-15        LIVER FUNCTIONS - ( 15 Dec 2019 18:45 )  Alb: 4.0 g/dL / Pro: 7.5 g/dL / ALK PHOS: 67 U/L / ALT: 35 U/L / AST: 24 U/L / GGT: x           PT/INR - ( 15 Dec 2019 18:45 )   PT: 12.2 sec;   INR: 1.07 ratio         PTT - ( 15 Dec 2019 18:45 )  PTT:31.9 sec    CAPILLARY BLOOD GLUCOSE          Imaging:  CXR personally reviewed: mild pulmonary edema  CTA chest:  1.  No pulmonary embolism.  2.  No gross CT evidence of pneumonia.  3.  Cardiomegaly with prominent left heart enlargement.  Interstitial   pulmonary edema.  Small pleural effusions bilaterally.  Correlate   clinically for congestive heart failure.  4.  Diffuse hepatic steatosis.        EKG personally reviewed: normal sinus rhythm at 97 bpm , lateral T wave flattening/inversion

## 2019-12-15 NOTE — ED PROVIDER NOTE - ATTENDING CONTRIBUTION TO CARE
58M with PMH of T2DM, HTN, CAD/MI s/p stents (most recent 2/2018), HFrEF p/w mora for the past few weeks now and intermittent chest pain but minimal and feels different then prior cardiac chest pain. no ekg changes, vss, hr 90s, with diminished bs likely copd excerbation with recent pulmonology eval and diangosed after screening ct chest, pft, no leg swelling.   cardiac work up and chf work up. no recent travel or leg swelling, ddimer sent.

## 2019-12-15 NOTE — ED PROVIDER NOTE - PROGRESS NOTE DETAILS
pt with unclear etiology, possible copd excerbation, new onset, vs chf, diimer elevated gfr 42, use 30 as cut off, ct angio ordered, discussed with ct tech.   lb jeffrey

## 2019-12-15 NOTE — H&P ADULT - PROBLEM SELECTOR PLAN 1
pulm edema on imaging , history consistent with worsening heart failure improved with lasix , will continue Diuresis   - IV lasix   - Transthoracic echocardiogram   - strict ins and outs   - daily weight   - telemetry   - cardiology consult - to be arranged and followed up by day team   - continue lisinopril- can benefit from up titrating to maximum tolerated dose   - cont. ASA and statin   - continue spironolactone  - Dietary counseling   - Dash diet

## 2019-12-15 NOTE — ED ADULT NURSE NOTE - NSIMPLEMENTINTERV_GEN_ALL_ED
Implemented All Universal Safety Interventions:  [place] to call system. Call bell, personal items and telephone within reach. Instruct patient to call for assistance. Room bathroom lighting operational. Non-slip footwear when patient is off stretcher. Physically safe environment: no spills, clutter or unnecessary equipment. Stretcher in lowest position, wheels locked, appropriate side rails in place.

## 2019-12-15 NOTE — ED PROVIDER NOTE - OBJECTIVE STATEMENT
60 y/o male hx of CAD with stents (last placed 2 years ago), newly diagnosed COPD recently placed on trelegy, DM, HTN, HLD who presents to the ED for 2 weeks of GREWAL. states he went to a pulmonologist who did testing and told him he had mild COPD. since that time however the symptoms are getting worse. endorses only being able to take a few steps before becoming significantly dyspneic. had one episode of chest pain earlier today that he states "felt nothing like when I had a heart attack". states the pain occurred while laying down, pressure like central squeezing that lasted only a few seconds. no recent illness or fever. + mild dry cough. no LE edema. patient quit smoking 2 weeks ago, 40 pack year hx also notes he was a  x 15 years.

## 2019-12-15 NOTE — H&P ADULT - NSICDXFAMILYHX_GEN_ALL_CORE_FT
FAMILY HISTORY:  Sibling  Still living? Unknown  Family history of COPD (chronic obstructive pulmonary disease), Age at diagnosis: Age Unknown

## 2019-12-15 NOTE — H&P ADULT - NSHPREVIEWOFSYSTEMS_GEN_ALL_CORE
CONSTITUTIONAL: No weakness, fevers or chills  EYES/ENT: No visual changes;  No dysphagia  NECK: No pain or stiffness  RESPIRATORY: + cough, no wheezing, no hemoptysis; + shortness of breath  CARDIOVASCULAR: + chest pain  no  palpitations; mild  lower extremity edema  EXTREMITIES:  mild le edema, cyanosis, clubbing  GASTROINTESTINAL: No abdominal or epigastric pain. No nausea, vomiting, or hematemesis; No diarrhea or constipation. No melena or hematochezia.  BACK: No back pain  GENITOURINARY: No dysuria, frequency or hematuria  NEUROLOGICAL: No numbness or weakness  MSK: no joint swelling or pain  SKIN: No itching, burning, rashes, or lesions   PSYCH: no agitation  All other review of systems is negative unless indicated above.

## 2019-12-15 NOTE — H&P ADULT - PROBLEM SELECTOR PLAN 4
-  lantus - reduced dose for inpatient use , can up titrate as tolerates   - lispro - reduced dose for inpatient use , can up titrate as tolerates   - HISS  -check fsg qac/qhs  - a1c   -consistent carb diet

## 2019-12-15 NOTE — H&P ADULT - NSHPSOCIALHISTORY_GEN_ALL_CORE
former smoker 1PPD (40 pack years), quit two weeks ago   no etoh, no drugs   lives at home with family

## 2019-12-15 NOTE — ED ADULT TRIAGE NOTE - CHIEF COMPLAINT QUOTE
pt states, "I have been having SOB for the past x2 weeks but yesterday and today it got worse. I also started to have chest pain"

## 2019-12-15 NOTE — H&P ADULT - ASSESSMENT
59  M w/pmh T2DM, HTN, CAD/MI s/p stents (most recent 2/2018), HFrEF ( EF 25%) , extensive smoking history quit about two weeks ago, COPD, p/w GREWAL x 2 weeks.

## 2019-12-15 NOTE — H&P ADULT - NSICDXPASTMEDICALHX_GEN_ALL_CORE_FT
PAST MEDICAL HISTORY:  AICD (automatic cardioverter/defibrillator) present     Diabetes     Stented coronary artery

## 2019-12-16 ENCOUNTER — APPOINTMENT (OUTPATIENT)
Dept: PULMONOLOGY | Facility: CLINIC | Age: 59
End: 2019-12-16

## 2019-12-16 DIAGNOSIS — N17.9 ACUTE KIDNEY FAILURE, UNSPECIFIED: ICD-10-CM

## 2019-12-16 DIAGNOSIS — Z71.89 OTHER SPECIFIED COUNSELING: ICD-10-CM

## 2019-12-16 DIAGNOSIS — E66.9 OBESITY, UNSPECIFIED: ICD-10-CM

## 2019-12-16 LAB
ALBUMIN SERPL ELPH-MCNC: 4.3 G/DL — SIGNIFICANT CHANGE UP (ref 3.3–5)
ALP SERPL-CCNC: 66 U/L — SIGNIFICANT CHANGE UP (ref 40–120)
ALT FLD-CCNC: 32 U/L — SIGNIFICANT CHANGE UP (ref 10–45)
ANION GAP SERPL CALC-SCNC: 15 MMOL/L — SIGNIFICANT CHANGE UP (ref 5–17)
AST SERPL-CCNC: 19 U/L — SIGNIFICANT CHANGE UP (ref 10–40)
BASOPHILS # BLD AUTO: 0.02 K/UL — SIGNIFICANT CHANGE UP (ref 0–0.2)
BASOPHILS NFR BLD AUTO: 0.2 % — SIGNIFICANT CHANGE UP (ref 0–2)
BILIRUB SERPL-MCNC: 0.4 MG/DL — SIGNIFICANT CHANGE UP (ref 0.2–1.2)
BUN SERPL-MCNC: 32 MG/DL — HIGH (ref 7–23)
CALCIUM SERPL-MCNC: 9.7 MG/DL — SIGNIFICANT CHANGE UP (ref 8.4–10.5)
CHLORIDE SERPL-SCNC: 100 MMOL/L — SIGNIFICANT CHANGE UP (ref 96–108)
CO2 SERPL-SCNC: 19 MMOL/L — LOW (ref 22–31)
CREAT SERPL-MCNC: 1.79 MG/DL — HIGH (ref 0.5–1.3)
EOSINOPHIL # BLD AUTO: 0.01 K/UL — SIGNIFICANT CHANGE UP (ref 0–0.5)
EOSINOPHIL NFR BLD AUTO: 0.1 % — SIGNIFICANT CHANGE UP (ref 0–6)
GLUCOSE BLDC GLUCOMTR-MCNC: 165 MG/DL — HIGH (ref 70–99)
GLUCOSE BLDC GLUCOMTR-MCNC: 210 MG/DL — HIGH (ref 70–99)
GLUCOSE BLDC GLUCOMTR-MCNC: 250 MG/DL — HIGH (ref 70–99)
GLUCOSE BLDC GLUCOMTR-MCNC: 347 MG/DL — HIGH (ref 70–99)
GLUCOSE SERPL-MCNC: 347 MG/DL — HIGH (ref 70–99)
HBA1C BLD-MCNC: 8.9 % — HIGH (ref 4–5.6)
HCT VFR BLD CALC: 37.9 % — LOW (ref 39–50)
HCV AB S/CO SERPL IA: 0.09 S/CO — SIGNIFICANT CHANGE UP (ref 0–0.99)
HCV AB SERPL-IMP: SIGNIFICANT CHANGE UP
HGB BLD-MCNC: 12 G/DL — LOW (ref 13–17)
IMM GRANULOCYTES NFR BLD AUTO: 1.1 % — SIGNIFICANT CHANGE UP (ref 0–1.5)
LYMPHOCYTES # BLD AUTO: 0.67 K/UL — LOW (ref 1–3.3)
LYMPHOCYTES # BLD AUTO: 6.8 % — LOW (ref 13–44)
MAGNESIUM SERPL-MCNC: 2 MG/DL — SIGNIFICANT CHANGE UP (ref 1.6–2.6)
MCHC RBC-ENTMCNC: 25.5 PG — LOW (ref 27–34)
MCHC RBC-ENTMCNC: 31.7 GM/DL — LOW (ref 32–36)
MCV RBC AUTO: 80.5 FL — SIGNIFICANT CHANGE UP (ref 80–100)
MONOCYTES # BLD AUTO: 0.26 K/UL — SIGNIFICANT CHANGE UP (ref 0–0.9)
MONOCYTES NFR BLD AUTO: 2.6 % — SIGNIFICANT CHANGE UP (ref 2–14)
NEUTROPHILS # BLD AUTO: 8.76 K/UL — HIGH (ref 1.8–7.4)
NEUTROPHILS NFR BLD AUTO: 89.2 % — HIGH (ref 43–77)
PHOSPHATE SERPL-MCNC: 3.8 MG/DL — SIGNIFICANT CHANGE UP (ref 2.5–4.5)
PLATELET # BLD AUTO: 166 K/UL — SIGNIFICANT CHANGE UP (ref 150–400)
POTASSIUM SERPL-MCNC: 4.5 MMOL/L — SIGNIFICANT CHANGE UP (ref 3.5–5.3)
POTASSIUM SERPL-SCNC: 4.5 MMOL/L — SIGNIFICANT CHANGE UP (ref 3.5–5.3)
PROT SERPL-MCNC: 7.6 G/DL — SIGNIFICANT CHANGE UP (ref 6–8.3)
RBC # BLD: 4.71 M/UL — SIGNIFICANT CHANGE UP (ref 4.2–5.8)
RBC # FLD: 15.7 % — HIGH (ref 10.3–14.5)
SODIUM SERPL-SCNC: 134 MMOL/L — LOW (ref 135–145)
WBC # BLD: 9.83 K/UL — SIGNIFICANT CHANGE UP (ref 3.8–10.5)
WBC # FLD AUTO: 9.83 K/UL — SIGNIFICANT CHANGE UP (ref 3.8–10.5)

## 2019-12-16 PROCEDURE — 99223 1ST HOSP IP/OBS HIGH 75: CPT

## 2019-12-16 RX ORDER — INSULIN GLARGINE 100 [IU]/ML
45 INJECTION, SOLUTION SUBCUTANEOUS AT BEDTIME
Refills: 0 | Status: DISCONTINUED | OUTPATIENT
Start: 2019-12-16 | End: 2019-12-17

## 2019-12-16 RX ORDER — INSULIN LISPRO 100/ML
16 VIAL (ML) SUBCUTANEOUS
Refills: 0 | Status: DISCONTINUED | OUTPATIENT
Start: 2019-12-16 | End: 2019-12-18

## 2019-12-16 RX ADMIN — TICAGRELOR 90 MILLIGRAM(S): 90 TABLET ORAL at 22:03

## 2019-12-16 RX ADMIN — LISINOPRIL 5 MILLIGRAM(S): 2.5 TABLET ORAL at 05:38

## 2019-12-16 RX ADMIN — TICAGRELOR 90 MILLIGRAM(S): 90 TABLET ORAL at 05:39

## 2019-12-16 RX ADMIN — TAMSULOSIN HYDROCHLORIDE 0.4 MILLIGRAM(S): 0.4 CAPSULE ORAL at 22:03

## 2019-12-16 RX ADMIN — Medication 4: at 09:13

## 2019-12-16 RX ADMIN — Medication 1 PATCH: at 08:39

## 2019-12-16 RX ADMIN — Medication 1: at 19:05

## 2019-12-16 RX ADMIN — Medication 50 MILLIGRAM(S): at 05:38

## 2019-12-16 RX ADMIN — Medication 3 MILLILITER(S): at 10:12

## 2019-12-16 RX ADMIN — Medication 81 MILLIGRAM(S): at 14:07

## 2019-12-16 RX ADMIN — ATORVASTATIN CALCIUM 40 MILLIGRAM(S): 80 TABLET, FILM COATED ORAL at 22:03

## 2019-12-16 RX ADMIN — Medication 16 UNIT(S): at 14:08

## 2019-12-16 RX ADMIN — Medication 12 UNIT(S): at 09:13

## 2019-12-16 RX ADMIN — Medication 1 PATCH: at 18:30

## 2019-12-16 RX ADMIN — Medication 20 MILLIGRAM(S): at 05:38

## 2019-12-16 RX ADMIN — INSULIN GLARGINE 45 UNIT(S): 100 INJECTION, SOLUTION SUBCUTANEOUS at 22:02

## 2019-12-16 RX ADMIN — Medication 2: at 14:07

## 2019-12-16 RX ADMIN — SPIRONOLACTONE 25 MILLIGRAM(S): 25 TABLET, FILM COATED ORAL at 05:39

## 2019-12-16 NOTE — CONSULT NOTE ADULT - ATTENDING COMMENTS
Will increase Lantus to 45u at bedtime, increase Humalog to 16u before each meal, and continue coverage scale.. Will continue monitoring FS, log, and FU.

## 2019-12-16 NOTE — PROGRESS NOTE ADULT - ASSESSMENT
59  M w/pmh T2DM, HTN, CAD/MI s/p stents (most recent 2/2018), HFrEF ( EF 25%) , extensive smoking history quit about two weeks ago, COPD, p/w GREWAL x 2 weeks.   admitted with acute on chronic systolic heart failure and copd exac      CHF exac  acute on chronic systolic  cards to see pt  cont diuresis with lasix 40 iv daily  strict i/o  daily wt  tele  echo pending  acs ruled out    copd exac  pulm consulted  nebs  inhalers    DM  uncontrolled a1c 8.9  endo consulted  insulin as per endo    cad s/p pci  cont asa/brilinta/statin/bb    servando on ckd  renal consulted  avoid nephrotoxins  monitor creat

## 2019-12-16 NOTE — CONSULT NOTE ADULT - SUBJECTIVE AND OBJECTIVE BOX
HPI:  Patient is a 58 y/o male w/pmh T2DM, HTN, CAD/MI s/p stents (most recent 2/2018), HFrEF, extensive smoking history quit about two weeks ago, newly diagnosed COPD and currently on trelegy, presents for worsened dyspnea on exertion over the past two weeks.   Patient reports , shortness of breath after a few steps, he was previously able to walk about a block . He was evaluated by pulmonary and diagnosed with mild COPD and started on Trelegy , however symptoms continued to worsen. He reports mild lower extremity edema . He reports increased orthopnea , but no PND episodes. He reports a non productive cough , no wheezing. He has no fever or chills , no flu-like symptoms. on day of admission he had one episode of chest pain  which occurred while laying down after coughing, pressure like, located in the substernal region , centrally located , lasted a few seconds and self resolved. (15 Dec 2019 22:34)    Patient has history of diabetes, A1C 8.9%, on insulin at home (Lantus 50u at bedtime, Humalog 20u before each meal), states his normal range is 120-150, no recent hypoglycemic episodes, no polyuria polydipsia. Patient follows up with PCP for diabetes management.  Endo was consulted for glycemic control.    PAST MEDICAL & SURGICAL HISTORY:  AICD (automatic cardioverter/defibrillator) present  Diabetes  Stented coronary artery  No significant past surgical history      FAMILY HISTORY:  Family history of COPD (chronic obstructive pulmonary disease) (Sibling)      Social History:    Outpatient Medications:    MEDICATIONS  (STANDING):  aspirin  chewable 81 milliGRAM(s) Oral daily  atorvastatin 40 milliGRAM(s) Oral at bedtime  budesonide 160 MICROgram(s)/formoterol 4.5 MICROgram(s) Inhaler 2 Puff(s) Inhalation two times a day  dextrose 5%. 1000 milliLiter(s) (50 mL/Hr) IV Continuous <Continuous>  dextrose 50% Injectable 12.5 Gram(s) IV Push once  dextrose 50% Injectable 25 Gram(s) IV Push once  furosemide   Injectable 20 milliGRAM(s) IV Push daily  heparin  Injectable 5000 Unit(s) SubCutaneous every 12 hours  insulin glargine Injectable (LANTUS) 45 Unit(s) SubCutaneous at bedtime  insulin lispro (HumaLOG) corrective regimen sliding scale   SubCutaneous three times a day before meals  insulin lispro (HumaLOG) corrective regimen sliding scale   SubCutaneous at bedtime  insulin lispro Injectable (HumaLOG) 16 Unit(s) SubCutaneous three times a day before meals  lisinopril 5 milliGRAM(s) Oral daily  metoprolol succinate ER 50 milliGRAM(s) Oral daily  nicotine - 21 mG/24Hr(s) Patch 1 patch Transdermal daily  spironolactone 25 milliGRAM(s) Oral daily  tamsulosin 0.4 milliGRAM(s) Oral at bedtime  ticagrelor 90 milliGRAM(s) Oral every 12 hours  tiotropium 18 MICROgram(s) Capsule 1 Capsule(s) Inhalation daily    MEDICATIONS  (PRN):  acetaminophen   Tablet .. 650 milliGRAM(s) Oral every 4 hours PRN Mild Pain (1 - 3)  albuterol/ipratropium for Nebulization 3 milliLiter(s) Nebulizer every 6 hours PRN Shortness of Breath and/or Wheezing  dextrose 40% Gel 15 Gram(s) Oral once PRN Blood Glucose LESS THAN 70 milliGRAM(s)/deciliter  glucagon  Injectable 1 milliGRAM(s) IntraMuscular once PRN Glucose LESS THAN 70 milligrams/deciliter      Allergies    No Known Allergies    Intolerances      Review of Systems:  Constitutional: No fever, no chills  Eyes: No blurry vision  Neuro: No tremors  HEENT: No pain, no neck swelling  Cardiovascular: No chest pain, no palpitations  Respiratory: Has SOB, no cough  GI: No nausea, vomiting, abdominal pain  : No dysuria  Skin: no rash  MSK: Has leg swelling.  Psych: no depression  Endocrine: no polyuria, polydipsia    ALL OTHER SYSTEMS REVIEWED AND NEGATIVE    UNABLE TO OBTAIN    PHYSICAL EXAM:  VITALS: T(C): 36.4 (12-16-19 @ 07:49)  T(F): 97.5 (12-16-19 @ 07:49), Max: 98.1 (12-16-19 @ 01:10)  HR: 98 (12-16-19 @ 07:49) (87 - 100)  BP: 115/78 (12-16-19 @ 07:49) (106/70 - 130/82)  RR:  (18 - 22)  SpO2:  (92% - 98%)  Wt(kg): --  GENERAL: NAD, well-groomed, well-developed  EYES: No proptosis, no lid lag  HEENT:  Atraumatic, Normocephalic  THYROID: Normal size, no palpable nodules  RESPIRATORY: Clear to auscultation bilaterally; No rales, rhonchi, wheezing  CARDIOVASCULAR: Si S2, No murmurs;  GI: Soft, non distended, normal bowel sounds  SKIN: Dry, intact, No rashes or lesions  MUSCULOSKELETAL: Has BL lower extremity edema.  NEURO:  no tremor, sensation decreased in feet BL,    POCT Blood Glucose.: 250 mg/dL (12-16-19 @ 13:56)  POCT Blood Glucose.: 347 mg/dL (12-16-19 @ 08:45)  POCT Blood Glucose.: 219 mg/dL (12-15-19 @ 23:15)                            12.0   9.83  )-----------( 166      ( 16 Dec 2019 08:15 )             37.9       12-16    134<L>  |  100  |  32<H>  ----------------------------<  347<H>  4.5   |  19<L>  |  1.79<H>    EGFR if : 48<L>  EGFR if non : 41<L>    Ca    9.7      12-16  Mg     2.0     12-16  Phos  3.8     12-16    TPro  7.6  /  Alb  4.3  /  TBili  0.4  /  DBili  x   /  AST  19  /  ALT  32  /  AlkPhos  66  12-16      Thyroid Function Tests:      Hemoglobin A1C, Whole Blood: 8.9 % <H> [4.0 - 5.6] (12-16-19 @ 08:15)          Radiology:

## 2019-12-16 NOTE — CONSULT NOTE ADULT - ASSESSMENT
Assessment  DMT2: 59y Male with DM T2 with hyperglycemia, A1C 8.9% , was on insulin at home (Lantus 50u at bedtime, Humalog 20u before meals), now on basal bolus insulin, blood sugars running high and not at target (-300s today), no hypoglycemic episode. Patient is eating full meals with good appetite, non compliant with low carb diet, appears comfortable and in good spirits.  HF: on medications, no chest pain, stable, monitored.  COPD: On management, stable, FU Pulm.  MANJIT: Monitor labs/BMP  Obesity: No strict exercise routines, not on any weight loss program, neither on low calorie diet.          Cortney Flanagan MD  Cell: [phone]  Office: [phone] Assessment  DMT2: 59y Male with DM T2 with hyperglycemia, A1C 8.9% ,  was on insulin at home (Lantus 50u at bedtime, Humalog 20u before meals), now on basal bolus insulin, blood sugars running high and not at target (-300s today), no hypoglycemic episode. Patient is eating full meals with good appetite, non compliant with low carb diet, appears comfortable and in good spirits.  HF: on medications, no chest pain, stable, monitored.  COPD: On management, stable, FU Pulm.  MANJIT: Monitor labs/BMP  Obesity: No strict exercise routines, not on any weight loss program, neither on low calorie diet.          Cortney Flanagan MD  Cell: [phone]  Office: [phone]

## 2019-12-16 NOTE — CONSULT NOTE ADULT - ASSESSMENT
59 year-old gentleman with cardiovascular history as above including ischemic cardiomyopathy now presents with acute on chronic systolic heart failure in the setting of recent dietary indiscretions.  Interrogate [place] ICD to evaluate for burden of VT.    Continue DAPT, high intensity statin, and beta-blocker at home dose.  Hold ACEi - plan to transition to Entresto if tolerated and covered by insurance.    Diuresis as tolerated.  Keep O > I, K > 4.0, Mag > 2.0  Check daily standing weights.    Recommend counselling with nutritionist regarding low salt diet.

## 2019-12-16 NOTE — CONSULT NOTE ADULT - SUBJECTIVE AND OBJECTIVE BOX
Patient seen and evaluated @ 1300 in [facility] ED  Chief Complaint: dyspnea on exertion    HPI:  Patient is a 58 y/o male w/pmh T2DM, HTN, CAD/MI s/p stents (most recent 2/2018), HFrEF, extensive smoking history quit about two weeks ago, newly diagnosed COPD and currently on trelegy, presents for worsened dyspnea on exertion over the past two weeks.   Patient reports , shortness of breath after a few steps, he was previously able to walk about a block . He was evaluated by pulmonary and diagnosed with mild COPD and started on Trelegy , however symptoms continued to worsen. He reports mild lower extremity edema . He reports increased orthopnea , but no PND episodes. He reports a non productive cough , no wheezing. He has no fever or chills , no flu-like symptoms. on day of admission he had one episode of chest pain  which occurred while laying down after coughing, pressure like, located in the substernal region , centrally located , lasted a few seconds and self resolved. (15 Dec 2019 22:34)    PMH:   AICD (automatic cardioverter/defibrillator) present - [place]  Diabetes  Stented coronary artery    PSH:   No significant past surgical history    Medications:   acetaminophen   Tablet .. 650 milliGRAM(s) Oral every 4 hours PRN  albuterol/ipratropium for Nebulization 3 milliLiter(s) Nebulizer every 6 hours PRN  aspirin  chewable 81 milliGRAM(s) Oral daily  atorvastatin 40 milliGRAM(s) Oral at bedtime  budesonide 160 MICROgram(s)/formoterol 4.5 MICROgram(s) Inhaler 2 Puff(s) Inhalation two times a day  dextrose 40% Gel 15 Gram(s) Oral once PRN  dextrose 5%. 1000 milliLiter(s) IV Continuous <Continuous>  dextrose 50% Injectable 12.5 Gram(s) IV Push once  dextrose 50% Injectable 25 Gram(s) IV Push once  furosemide   Injectable 20 milliGRAM(s) IV Push daily  glucagon  Injectable 1 milliGRAM(s) IntraMuscular once PRN  heparin  Injectable 5000 Unit(s) SubCutaneous every 12 hours  insulin glargine Injectable (LANTUS) 45 Unit(s) SubCutaneous at bedtime  insulin lispro (HumaLOG) corrective regimen sliding scale   SubCutaneous three times a day before meals  insulin lispro (HumaLOG) corrective regimen sliding scale   SubCutaneous at bedtime  insulin lispro Injectable (HumaLOG) 16 Unit(s) SubCutaneous three times a day before meals  metoprolol succinate ER 50 milliGRAM(s) Oral daily  nicotine - 21 mG/24Hr(s) Patch 1 patch Transdermal daily  spironolactone 25 milliGRAM(s) Oral daily  tamsulosin 0.4 milliGRAM(s) Oral at bedtime  ticagrelor 90 milliGRAM(s) Oral every 12 hours  tiotropium 18 MICROgram(s) Capsule 1 Capsule(s) Inhalation daily    Allergies:  No Known Allergies    FAMILY HISTORY:  Family history of COPD (chronic obstructive pulmonary disease) (Sibling)    Social History: , lives with wife, retired   Smoking: former heavy smoker  Alcohol: no EtOH abuse  Drugs: no illicit drug use    Review of Systems:    Constitutional: No fever, chills, fatigue, + weight gain  HEENT: No blurry vision, eye pain, headache, runny nose, or sore throat  Respiratory: +shortness of breath  Cardiovascular: No chest pain or palpitations  Gastrointestinal: No nausea, vomiting, diarrhea, or abdominal pain  Genitourinary: No dysuria or incontinence  Extremities: +lower extremity swelling  Neurologic: No focal findings  Lymphatic: No lymphadenopathy   Skin: No rash  Psychiatry: No anxiety or depression  10 point review of systems is otherwise negative except as mentioned above            Physical Exam:  T(C): 36.6 (12-16-19 @ 18:00), Max: 36.7 (12-16-19 @ 00:36)  HR: 100 (12-16-19 @ 18:00) (89 - 100)  BP: 119/72 (12-16-19 @ 18:00) (106/70 - 124/74)  RR: 18 (12-16-19 @ 18:00) (18 - 20)  SpO2: 98% (12-16-19 @ 18:00) (92% - 98%)  Wt(kg): --    Daily Height in cm: 193.04 (16 Dec 2019 18:00)    Daily     Appearance: Normal, well groomed, NAD  Eyes: PERRLA, EOMI, pink conjunctiva, no scleral icterus   HENT: Normal oral mucosa  Cardiovascular: RRR, S1, S2, no murmur, rub, or gallop; +edema; +JVD  Respiratory: diminished breath sounds throughout  Gastrointestinal: Soft, non-tender, non-distended, BS+  Musculoskeletal: No clubbing or joint deformity   Neurologic: No focal weakness  Lymphatic: No lymphadenopathy  Psychiatry: AAOx3 with appropriate mood and affect  Skin: No rashes, ecchymoses, or cyanosis; +tattoos     Cardiovascular Diagnostic Testing:  ECG: sinus tach, 100 bpm, normal axis, poor R-wave progression    Echo: < from: TTE with Doppler (w/Cont) (02.08.18 @ 06:06) >  ------------------------------------------------------------------------  Dimensions:    Normal Values:  LA:     4.0    2.0 - 4.0 cm  Ao:     3.5    2.0 - 3.8 cm  SEPTUM: 1.0    0.6 - 1.2 cm  PWT:    1.2    0.6 - 1.1 cm  LVIDd:  6.5    3.0 - 5.6 cm  LVIDs:  5.3    1.8 - 4.0 cm  Derived variables:  LVMI: 132 g/m2  RWT: 0.36  EF (Visual Estimate): 20-25 %  ------------------------------------------------------------------------  Observations:  Mitral Valve: Grossly normal mitral valve. Minimal mitral  regurgitation.  Aortic Valve/Aorta: Trileaflet aortic valve. Mean  transaortic valve gradient equals 2 mm Hg, aortic valve  velocity time integral equals 20 cm. No aortic valve  regurgitation seen. Mean gradient is equal to 1 mm Hg, LVOT  velocity time integral equals 11 cm.  Normal aortic root size. (Ao: 3.5 cm at the sinuses of  Valsalva).  Left Atrium: Left atrium not well visualized.  Left Ventricle: Endocardial visualization enhanced with  intravenous injection ofecho contrast (Definity).  Severe  segmental left ventricular systolic dysfunction. The  anteroseptal, inferoseptal, anterior walls and all apical  segments are akinetic.  No left ventricular thrombus.  Swirling of contrast suggests low flow.  Moderate left  ventricular enlargement. Mild diastolic dysfunction (Stage  I).  Right Heart: Normal right atrium. The right ventricle is  not well visualized; grossly normal right ventricular  systolic function. TAPSE > 2.0 cm (normal).  Tricuspid  valve not well visualized. Pulmonic valve not well  visualized, probably normal.  Pericardium/Pleura: Trace pericardial effusion.  Hemodynamic: Estimated right atrial pressure is 8 mm Hg.  Inadequate tricuspid regurgitation Doppler envelope  precludes estimation of RVSP.  ------------------------------------------------------------------------  Conclusions:  1. Endocardial visualization enhanced with intravenous  injection of echo contrast (Definity).  Severe segmental  left ventricular systolic dysfunction. The anteroseptal,  inferoseptal, anterior walls and all apical segments are  akinetic.  No left ventricular thrombus. Swirling of  contrast suggests low flow.  2. Mild diastolic dysfunction (Stage I).  3. Trace pericardial effusion.  *** No previous Echo exam. Technically difficult (portable)  study.  ------------------------------------------------------------------------  Revised on 2/8/2018 - 9:14 AM by Major Remy M.D.  ------------------------------------------------------------------------    < end of copied text >    Cath: < from: Cardiac Cath Lab - Adult (02.05.18 @ 16:05) >  VENTRICLES: Analysis of regional contractile function demonstrated severe  anterolateral hypokinesis, apical dyskinesis, and diaphragmatic  dyskinesis. EF estimated was 25 %.  CORONARY VESSELS: The coronary circulation is right dominant.  LM:   --  LM: Angiography showed minor luminal irregularities with no flow  limiting lesions.  LAD:   --  Ostial LAD: There was a 100 % stenosis.  CX:   --  Mid circumflex: There was a 30 % stenosis.  RI:   --  Ramus intermedius: There was a 100 % stenosis.  RCA:   --  RCA: Angiography showed minor luminal irregularities with no  flow limiting lesions.  COMPLICATIONS: There were no complications. No complications occurred  during the cath lab visit.  DIAGNOSTIC RECOMMENDATIONS: ASA and Plavix for 1 year.  INTERVENTIONAL RECOMMENDATIONS: ASA and Plavix for 1 year.  Prepared and signed by  Stefan Mireles M.D.  Signed 02/05/2018 17:53:51    < end of copied text >    Interpretation of Telemetry: NSR with 8 beats wide complex tachycardia    Labs:                        12.0   9.83  )-----------( 166      ( 16 Dec 2019 08:15 )             37.9     12-16    134<L>  |  100  |  32<H>  ----------------------------<  347<H>  4.5   |  19<L>  |  1.79<H>    Ca    9.7      16 Dec 2019 06:16  Phos  3.8     12-16  Mg     2.0     12-16    TPro  7.6  /  Alb  4.3  /  TBili  0.4  /  DBili  x   /  AST  19  /  ALT  32  /  AlkPhos  66  12-16    PT/INR - ( 15 Dec 2019 18:45 )   PT: 12.2 sec;   INR: 1.07 ratio         PTT - ( 15 Dec 2019 18:45 )  PTT:31.9 sec      Serum Pro-Brain Natriuretic Peptide: 432 pg/mL (12-15 @ 18:45)      Hemoglobin A1C, Whole Blood: 8.9 % (12-16 @ 08:15)

## 2019-12-16 NOTE — CONSULT NOTE ADULT - ASSESSMENT
ASSESSMENT:    progressive dyspnea which appears to be mainly due to a severe ischemic cardiomyopathy with mild pulmonary edema and small pleural effusions with atelectasis >> underlying COPD/emphysema    obstructive sleep apnea    HTN/HLD/DM    PLAN/RECOMMENDATIONS:    stable oxygenation on room air  symbicort/spiriva in lieu of usual trelegy ellipta  observe off systemic steroids  await ECHO  await AICD interrogation  await cardiology evaluation  cardiac meds: ASA/brilinta/lisinopril/toprol XL/lasix/aldactone/lipitor  glucose control  DVT prophylaxis  smoking cessation - nicotine patch  review outside PFTs    Thank you for the courtesy of this referral. Plan of care discussed with the patient at bedside and Dr. Marie.    Monty Welsh MD, [facility]  [phone]  Pulmonary Medicine ASSESSMENT:    progressive dyspnea which appears to be mainly due to a severe ischemic cardiomyopathy with mild pulmonary edema and small pleural effusions with atelectasis >> underlying COPD/emphysema    obstructive sleep apnea    HTN/HLD/DM    PLAN/RECOMMENDATIONS:    stable oxygenation on room air  head of bed elevation and oxygen supplementation for sleep  symbicort/spiriva in lieu of usual trelegy ellipta  observe off systemic steroids  await ECHO  await AICD interrogation  await cardiology evaluation  diurese as tolerated by renal function and hemodynamics  cardiac meds: ASA/brilinta/lisinopril/toprol XL/lasix/aldactone/lipitor  glucose control  DVT prophylaxis  smoking cessation - nicotine patch  review outside PFTs    Thank you for the courtesy of this referral. Plan of care discussed with the patient at bedside and Dr. Marie.    Monty Welsh MD, [facility]  [phone]  Pulmonary Medicine

## 2019-12-16 NOTE — CONSULT NOTE ADULT - SUBJECTIVE AND OBJECTIVE BOX
[facility] - [facility] - CONSULT NOTE    HPI:    PMHX:  AICD (automatic cardioverter/defibrillator) present  Diabetes  Stented coronary artery      PSHX:  No significant past surgical history      FAMILY HISTORY:  Family history of COPD (chronic obstructive pulmonary disease) (Sibling)      SOCIAL HISTORY:    Pulmonary Medications:   budesonide 160 MICROgram(s)/formoterol 4.5 MICROgram(s) Inhaler 2 Puff(s) Inhalation two times a day  tiotropium 18 MICROgram(s) Capsule 1 Capsule(s) Inhalation daily      Antimicrobials:      Cardiology:  furosemide   Injectable 20 milliGRAM(s) IV Push daily  lisinopril 5 milliGRAM(s) Oral daily  metoprolol succinate ER 50 milliGRAM(s) Oral daily  spironolactone 25 milliGRAM(s) Oral daily  tamsulosin 0.4 milliGRAM(s) Oral at bedtime      Other:  aspirin  chewable 81 milliGRAM(s) Oral daily  atorvastatin 40 milliGRAM(s) Oral at bedtime  dextrose 5%. 1000 milliLiter(s) IV Continuous <Continuous>  dextrose 50% Injectable 12.5 Gram(s) IV Push once  dextrose 50% Injectable 25 Gram(s) IV Push once  heparin  Injectable 5000 Unit(s) SubCutaneous every 12 hours  insulin glargine Injectable (LANTUS) 45 Unit(s) SubCutaneous at bedtime  insulin lispro (HumaLOG) corrective regimen sliding scale   SubCutaneous three times a day before meals  insulin lispro (HumaLOG) corrective regimen sliding scale   SubCutaneous at bedtime  insulin lispro Injectable (HumaLOG) 16 Unit(s) SubCutaneous three times a day before meals  nicotine - 21 mG/24Hr(s) Patch 1 patch Transdermal daily  ticagrelor 90 milliGRAM(s) Oral every 12 hours      Prn:  MEDICATIONS  (PRN):  acetaminophen   Tablet .. 650 milliGRAM(s) Oral every 4 hours PRN Mild Pain (1 - 3)  albuterol/ipratropium for Nebulization 3 milliLiter(s) Nebulizer every 6 hours PRN Shortness of Breath and/or Wheezing  dextrose 40% Gel 15 Gram(s) Oral once PRN Blood Glucose LESS THAN 70 milliGRAM(s)/deciliter  glucagon  Injectable 1 milliGRAM(s) IntraMuscular once PRN Glucose LESS THAN 70 milligrams/deciliter      Allergies    No Known Allergies    Intolerances        HOME MEDICATIONS: see  H & P    REVIEW OF SYSTEMS:  Constitutional: As per HPI  HEENT: Within normal limits  CV: As per HPI  Resp: As per HPI  GI: Within normal limits   : Within normal limits  Musculoskeletal: Within normal limits  Skin: Within normal limits  Neurological: Within normal limits  Psychiatric: Within normal limits  Endocrine: Within normal limits  Hematologic/Lymphatic: Within normal limits  Allergic/Immunologic: Within normal limits    [x] All other systems negative    OBJECTIVE:    I&O's Detail    Daily Height in cm: 195.58 (15 Dec 2019 17:14)    Daily   POCT Blood Glucose.: 347 mg/dL (16 Dec 2019 08:45)  POCT Blood Glucose.: 219 mg/dL (15 Dec 2019 23:15)      PHYSICAL EXAM:  ICU Vital Signs Last 24 Hrs  T(C): 36.4 (16 Dec 2019 07:49), Max: 36.7 (15 Dec 2019 18:45)  T(F): 97.5 (16 Dec 2019 07:49), Max: 98.1 (16 Dec 2019 01:10)  HR: 98 (16 Dec 2019 07:49) (87 - 100)  BP: 115/78 (16 Dec 2019 07:49) (106/70 - 130/82)  BP(mean): --  ABP: --  ABP(mean): --  RR: 18 (16 Dec 2019 07:49) (18 - 22)  SpO2: 96% (16 Dec 2019 07:49) (92% - 98%)    General: Awake. Alert. Cooperative. No distress. Appears stated age 	  HEENT:   Atraumatic. Normocephalic. Anicteric. Normal oral mucosa. PERRL. EOMI.  Neck: Supple. Trachea midline. Thyroid without enlargement/tenderness/nodules. No carotid bruit. No JVD.	  Cardiovascular: Regular rate and rhythm. S1 S2 normal. No murmurs, rubs or gallops.  Respiratory: Respirations unlabored. Clear to auscultation and percussion bilaterally. No curvature.  Abdomen: Soft. Non-tender. Non-distended. No organomegaly. No masses. Normal bowel sounds.  Extremities: Warm to touch. No clubbing or cyanosis. No pedal edema.  Pulses: 2+ peripheral pulses all extremities.	  Skin: Normal skin color. No rashes or lesions. No ecchymoses. No cyanosis. Warm to touch.  Lymph Nodes: Cervical, supraclavicular and axillary nodes normal  Neurological: Motor and sensory examination equal and normal. A and O x 3  Psychiatry: Appropriate mood and affect.      LABS:                          12.0   9.83  )-----------( 166      ( 16 Dec 2019 08:15 )             37.9     CBC    WBC  9.83 <==, 7.73 <==    Hemoglobin  12.0 <<==, 12.2 <<==    Hematocrit  37.9 <==, 39.5 <==    Platelets  166 <==, 200 <==      134<L>  |  100  |  32<H>  ----------------------------<  347<H>    12-16  4.5   |  19<L>  |  1.79<H>    LYTES    sodium  134 <==, 138 <==    potassium   4.5 <==, 3.9 <==    chloride  100 <==, 105 <==    carbon dioxide  19 <==, 20 <==    =============================================================================================  RENAL FUNCTION:    Creatinine:   1.79  <<==, 1.75  <<==    BUN:   32 <==, 26 <==    ============================================================================================    calcium   9.7 <==, 9.7 <==    phos   3.8 <==    mag   2.0 <==    ============================================================================================  LFTs    AST:   19 <== , 24 <==     ALT:  32  <== , 35  <==     AP:  66  <=, 67  <=    Bili:  0.4  <=, 0.3  <=    PT/INR - ( 15 Dec 2019 18:45 )   PT: 12.2 sec;   INR: 1.07 ratio       PTT - ( 15 Dec 2019 18:45 )  PTT: 31.9 sec    Serum Pro-Brain Natriuretic Peptide: 432 pg/mL (12-15 @ 18:45)    D-Dimer Assay, Quantitative: 285 ng/mL DDU (12-15 @ 18:45)    CARDIAC MARKERS ( 15 Dec 2019 20:34 )  CPK x     /CKMB x     /CKMB Units x        troponin 32 ng/L    CARDIAC MARKERS ( 15 Dec 2019 18:45 )  CPK x     /CKMB x     /CKMB Units x        troponin 33 ng/L      Patient name: SEVERIANO MARTINEZ  YOB: 1960   Age: 57 (M)   MR#: [id number]  Study Date: 2/8/2018  Location: [place] [id number]: Radha Springer RDCS  Study quality: Technically difficult  Referring Physician: Kylah Amaral MD  Blood Pressure: 90/67 mmHg  Height: 193 cm  Weight: 113 kg  BSA: 2.4 m2  ------------------------------------------------------------------------  PROCEDURE: Transthoracic echocardiogram with 2-D, M-Mode  and complete spectral and color flow Doppler. Verbal  consent was obtained for injection of echo contrast  following a discussion of risks and benefits. Following  intravenous injection of contrast, harmonic imaging was  performed.  INDICATION: Subsequent ST elevation (STEMI) myocardial  infarction of anterior wall (I22.0)  ------------------------------------------------------------------------  Dimensions:    Normal Values:  LA:     4.0    2.0 - 4.0 cm  Ao:     3.5    2.0 - 3.8 cm  SEPTUM: 1.0    0.6 - 1.2 cm  PWT:    1.2    0.6 - 1.1 cm  LVIDd:  6.5    3.0 - 5.6 cm  LVIDs:  5.3    1.8 - 4.0 cm  Derived variables:  LVMI: 132 g/m2  RWT: 0.36  EF (Visual Estimate): 20-25 %  ------------------------------------------------------------------------  Observations:  Mitral Valve: Grossly normal mitral valve. Minimal mitral  regurgitation.  Aortic Valve/Aorta: Trileaflet aortic valve. Mean  transaortic valve gradient equals 2 mm Hg, aortic valve  velocity time integral equals 20 cm. No aortic valve  regurgitation seen. Mean gradient is equal to 1 mm Hg, LVOT  velocity time integral equals 11 cm.  Normal aortic root size. (Ao: 3.5 cm at the sinuses of  Valsalva).  Left Atrium: Left atrium not well visualized.  Left Ventricle: Endocardial visualization enhanced with  intravenous injection of echo contrast (Definity).  Severe  segmental left ventricular systolic dysfunction. The  anteroseptal, inferoseptal, anterior walls and all apical  segments are akinetic.  No left ventricular thrombus.  Swirling of contrast suggests low flow.  Moderate left  ventricular enlargement. Mild diastolic dysfunction (Stage  I).  Right Heart: Normal right atrium. The right ventricle is  not well visualized; grossly normal right ventricular  systolic function. TAPSE > 2.0 cm (normal).  Tricuspid  valve not well visualized. Pulmonic valve not well  visualized, probably normal.  Pericardium/Pleura: Trace pericardial effusion.  Hemodynamic: Estimated right atrial pressure is 8 mm Hg.  Inadequate tricuspid regurgitation Doppler envelope  precludes estimation of RVSP.  ------------------------------------------------------------------------  Conclusions:  1. Endocardial visualization enhanced with intravenous  injection of echo contrast (Definity).  Severe segmental  left ventricular systolic dysfunction. The anteroseptal,  inferoseptal, anterior walls and all apical segments are  akinetic.  No left ventricular thrombus. Swirling of  contrast suggests low flow.  2. Mild diastolic dysfunction (Stage I).  3. Trace pericardial effusion.  *** No previous Echo exam. Technically difficult (portable)  study.  ------------------------------------------------------------------------  Revised on 2/8/2018 - 9:14 AM by Major Remy M.D.  ------------------------------------------------------------------------  ---------------------------------------------------------------------------------------------------------------    MICROBIOLOGY:       RADIOLOGY:  [x ] Chest radiographs reviewed and interpreted by me    EXAM:  XR CHEST PA LAT 2V                          PROCEDURE DATE:  12/15/2019      INTERPRETATION:  CLINICAL INFORMATION: Shortness of breath.    EXAM: PA and lateral chest radiographs    COMPARISON: Chest radiograph from 2/10/2019    FINDINGS:  Left-sided AICD. The heart is slightly enlarged.  No focal consolidations. Trace pleural effusion and mild pulmonary   vascular congestion.. No pneumothorax.  The visualized osseous structures demonstrate no acute pathology.    IMPRESSION:  Trace pleural effusion and mild pulmonary vascular congestion.    ALFREDO FRANCE M.D., RADIOLOGY RESIDENT  This document has been electronically signed.  SLADE JIN M.D., ATTENDING RADIOLOGIST  This document has been electronically signed. Dec 16 2019  9:41AM  ---------------------------------------------------------------------------------------------------------------    EXAM:  CT ANGIO CHEST (W)AW IC                          PROCEDURE DATE:  12/15/2019      INTERPRETATION:  CLINICAL INFORMATION: Shortness of breath and elevated   d-dimer.  Evaluate for pulmonary embolism.    COMPARISON: CT chest from 11/11/2019..    PROCEDURE:   CT Angiography of the Chest.  90 ml of Omnipaque 350 was injected intravenously. 10 ml were discarded.  Sagittal and coronal reformats were performed as well as 3D (MIP)   reconstructions.    FINDINGS:    LUNGS AND AIRWAYS: Patent central airways.  Mild paraseptal emphysema in   the upper lobes.  Minor dependent atelectasis dependently in both lungs.    Interlobular septal thickening in the lung bases likely reflect   interstitial    PLEURA: Small pleural effusions bilaterally.    MEDIASTINUM AND BOB: No lymphadenopathy.    VESSELS: No pulmonary embolism.  Mild atherosclerotic calcification of   the proximal left subclavian artery.    HEART: Cardiomegaly with predominantly left heart enlargement.  An AICD   lead terminates in the right ventricle.  Multiple coronary artery stents   in place. Trace pericardial fluid.    CHEST WALL AND LOWER NECK: AICD battery in the left chest wall.  Trace   gynecomastia.    VISUALIZED UPPER ABDOMEN: Tiny hiatal hernia.  Diffuse hepatic steatosis.    The upper pole the right kidney is not imaged.    BONES: Within normal limits.    IMPRESSION:   1.  No pulmonary embolism.  2.  No gross CT evidence of pneumonia.  3.  Cardiomegaly with prominent left heart enlargement.  Interstitial  pulmonary edema.  Small pleural effusions bilaterally.  Correlate   clinically for congestive heart failure.  4.  Diffuse hepatic steatosis.    ELLIOTT RODGERS M.D., RADIOLOGY RESIDENT  This document has been electronically signed.  IRIS ZIMMER M.D.,ATTENDING RADIOLOGIST  This document has been electronically signed. Dec 15 2019  9:16PM  --------------------------------------------------------------------------------------------------------------- [facility] - [facility] - CONSULT NOTE    HPI: 59 year old gentleman, recent heavy smoker, with history of a severe ischemic cardiomyopathy treated in the past with stents. He was recently diagnosed with COPD and started on Trelegy Ellipta and obstructive sleep apnea awaiting delivery of home BIPAP system. A screening chest CT for lung cancer in November revealed no pulmonary nodules but mild interlobular septal thickening suggestive of pulmonary edema. The patient describes worsening shortness of breath with exertion over the last several months worsening markedly over the last several weeks. At present he is getting dyspneic while carrying objects on the flat, when climbing a flight of stairs or when laying flat. He has mild lower extremity swelling. He has no cough, sputum production, chest congestion or wheeze. No fevers, chills or sweats. The patient feels improved after diuretics, steroids and bronchodilators. He is awaiting an ECHO and interrogation of his AICD after 8 beats of WCT over night.     PMHX:  Hypertension  Hyperlipidemia  Diabetes  CAD  Ischemic cardiomyopathy  AICD (automatic cardioverter/defibrillator) present  Stented coronary artery    PSHX:  Percutaneous coronary intervention in 2018      FAMILY HISTORY:  COPD      SOCIAL HISTORY:  ; lives with wife; retired from [employer]; recent heavy smoker     Pulmonary Medications:   budesonide 160 MICROgram(s)/formoterol 4.5 MICROgram(s) Inhaler 2 Puff(s) Inhalation two times a day  tiotropium 18 MICROgram(s) Capsule 1 Capsule(s) Inhalation daily      Antimicrobials:      Cardiology:  furosemide   Injectable 20 milliGRAM(s) IV Push daily  lisinopril 5 milliGRAM(s) Oral daily  metoprolol succinate ER 50 milliGRAM(s) Oral daily  spironolactone 25 milliGRAM(s) Oral daily  tamsulosin 0.4 milliGRAM(s) Oral at bedtime      Other:  aspirin  chewable 81 milliGRAM(s) Oral daily  atorvastatin 40 milliGRAM(s) Oral at bedtime  dextrose 5%. 1000 milliLiter(s) IV Continuous <Continuous>  dextrose 50% Injectable 12.5 Gram(s) IV Push once  dextrose 50% Injectable 25 Gram(s) IV Push once  heparin  Injectable 5000 Unit(s) SubCutaneous every 12 hours  insulin glargine Injectable (LANTUS) 45 Unit(s) SubCutaneous at bedtime  insulin lispro (HumaLOG) corrective regimen sliding scale   SubCutaneous three times a day before meals  insulin lispro (HumaLOG) corrective regimen sliding scale   SubCutaneous at bedtime  insulin lispro Injectable (HumaLOG) 16 Unit(s) SubCutaneous three times a day before meals  nicotine - 21 mG/24Hr(s) Patch 1 patch Transdermal daily  ticagrelor 90 milliGRAM(s) Oral every 12 hours      Prn:  MEDICATIONS  (PRN):  acetaminophen   Tablet .. 650 milliGRAM(s) Oral every 4 hours PRN Mild Pain (1 - 3)  albuterol/ipratropium for Nebulization 3 milliLiter(s) Nebulizer every 6 hours PRN Shortness of Breath and/or Wheezing  dextrose 40% Gel 15 Gram(s) Oral once PRN Blood Glucose LESS THAN 70 milliGRAM(s)/deciliter  glucagon  Injectable 1 milliGRAM(s) IntraMuscular once PRN Glucose LESS THAN 70 milligrams/deciliter      Allergies    No Known Allergies    HOME MEDICATIONS: see  H & P    REVIEW OF SYSTEMS:  Constitutional: As per HPI  HEENT: Within normal limits  CV: As per HPI  Resp: As per HPI  GI: Within normal limits   : Within normal limits  Musculoskeletal: Within normal limits  Skin: Within normal limits  Neurological: Within normal limits  Psychiatric: Within normal limits  Endocrine: Within normal limits  Hematologic/Lymphatic: Within normal limits  Allergic/Immunologic: Within normal limits    [x] All other systems negative    OBJECTIVE:    Daily Height in cm: 195.58 (15 Dec 2019 17:14)      POCT Blood Glucose.: 347 mg/dL (16 Dec 2019 08:45)  POCT Blood Glucose.: 219 mg/dL (15 Dec 2019 23:15)      PHYSICAL EXAM:  ICU Vital Signs Last 24 Hrs  T(C): 36.4 (16 Dec 2019 07:49), Max: 36.7 (15 Dec 2019 18:45)  T(F): 97.5 (16 Dec 2019 07:49), Max: 98.1 (16 Dec 2019 01:10)  HR: 98 (16 Dec 2019 07:49) (87 - 100)  BP: 115/78 (16 Dec 2019 07:49) (106/70 - 130/82)  BP(mean): --  ABP: --  ABP(mean): --  RR: 18 (16 Dec 2019 07:49) (18 - 22)  SpO2: 96% (16 Dec 2019 07:49) (92% - 98%) on room air    General: Awake. Alert. Cooperative. No distress. Appears stated age 	  HEENT: Atraumatic. Normocephalic. Anicteric. Normal oral mucosa. PERRL. EOMI. Mallampati class IV airway.  Neck: Supple. Trachea midline. Thyroid without enlargement/tenderness/nodules. No carotid bruit. No JVD.	  Cardiovascular: Regular rate and rhythm. S1 S2 normal. No murmurs, rubs or gallops.  Respiratory: Respirations unlabored. Scattered rales. No curvature.  Abdomen: Soft. Non-tender. Non-distended. No organomegaly. No masses. Normal bowel sounds.  Extremities: Warm to touch. No clubbing or cyanosis. Mild pretibial edema.  Pulses: 2+ peripheral pulses all extremities.	  Skin: Normal skin color. No rashes or lesions. No ecchymoses. No cyanosis. Warm to touch.  Lymph Nodes: Cervical, supraclavicular and axillary nodes normal  Neurological: Motor and sensory examination equal and normal. A and O x 3  Psychiatry: Appropriate mood and affect.      LABS:                          12.0   9.83  )-----------( 166      ( 16 Dec 2019 08:15 )             37.9     CBC    WBC  9.83 <==, 7.73 <==    Hemoglobin  12.0 <<==, 12.2 <<==    Hematocrit  37.9 <==, 39.5 <==    Platelets  166 <==, 200 <==      134<L>  |  100  |  32<H>  ----------------------------<  347<H>    12-16  4.5   |  19<L>  |  1.79<H>    LYTES    sodium  134 <==, 138 <==    potassium   4.5 <==, 3.9 <==    chloride  100 <==, 105 <==    carbon dioxide  19 <==, 20 <==    =============================================================================================  RENAL FUNCTION:    Creatinine:   1.79  <<==, 1.75  <<==    BUN:   32 <==, 26 <==    ============================================================================================    calcium   9.7 <==, 9.7 <==    phos   3.8 <==    mag   2.0 <==    ============================================================================================  LFTs    AST:   19 <== , 24 <==     ALT:  32  <== , 35  <==     AP:  66  <=, 67  <=    Bili:  0.4  <=, 0.3  <=    PT/INR - ( 15 Dec 2019 18:45 )   PT: 12.2 sec;   INR: 1.07 ratio       PTT - ( 15 Dec 2019 18:45 )  PTT: 31.9 sec    Serum Pro-Brain Natriuretic Peptide: 432 pg/mL (12-15 @ 18:45)    D-Dimer Assay, Quantitative: 285 ng/mL DDU (12-15 @ 18:45)    CARDIAC MARKERS ( 15 Dec 2019 20:34 )  CPK x     /CKMB x     /CKMB Units x        troponin 32 ng/L    CARDIAC MARKERS ( 15 Dec 2019 18:45 )  CPK x     /CKMB x     /CKMB Units x        troponin 33 ng/L      Patient name: SEVERIANO MARTINEZ  YOB: 1960   Age: 57 (M)   MR#: [id number]  Study Date: 2/8/2018  Location: [id number]: Radha Springer RDCS  Study quality: Technically difficult  Referring Physician: Kylah Amaral MD  Blood Pressure: 90/67 mmHg  Height: 193 cm  Weight: 113 kg  BSA: 2.4 m2  ------------------------------------------------------------------------  PROCEDURE: Transthoracic echocardiogram with 2-D, M-Mode  and complete spectral and color flow Doppler. Verbal  consent was obtained for injection of echo contrast  following a discussion of risks and benefits. Following  intravenous injection of contrast, harmonic imaging was  performed.  INDICATION: Subsequent ST elevation (STEMI) myocardial  infarction of anterior wall (I22.0)  ------------------------------------------------------------------------  Dimensions:    Normal Values:  LA:     4.0    2.0 - 4.0 cm  Ao:     3.5    2.0 - 3.8 cm  SEPTUM: 1.0    0.6 - 1.2 cm  PWT:    1.2    0.6 - 1.1 cm  LVIDd:  6.5    3.0 - 5.6 cm  LVIDs:  5.3    1.8 - 4.0 cm  Derived variables:  LVMI: 132 g/m2  RWT: 0.36  EF (Visual Estimate): 20-25 %  ------------------------------------------------------------------------  Observations:  Mitral Valve: Grossly normal mitral valve. Minimal mitral  regurgitation.  Aortic Valve/Aorta: Trileaflet aortic valve. Mean  transaortic valve gradient equals 2 mm Hg, aortic valve  velocity time integral equals 20 cm. No aortic valve  regurgitation seen. Mean gradient is equal to 1 mm Hg, LVOT  velocity time integral equals 11 cm.  Normal aortic root size. (Ao: 3.5 cm at the sinuses of  Valsalva).  Left Atrium: Left atrium not well visualized.  Left Ventricle: Endocardial visualization enhanced with  intravenous injection of echo contrast (Definity).  Severe  segmental left ventricular systolic dysfunction. The  anteroseptal, inferoseptal, anterior walls and all apical  segments are akinetic.  No left ventricular thrombus.  Swirling of contrast suggests low flow.  Moderate left  ventricular enlargement. Mild diastolic dysfunction (Stage  I).  Right Heart: Normal right atrium. The right ventricle is  not well visualized; grossly normal right ventricular  systolic function. TAPSE > 2.0 cm (normal).  Tricuspid  valve not well visualized. Pulmonic valve not well  visualized, probably normal.  Pericardium/Pleura: Trace pericardial effusion.  Hemodynamic: Estimated right atrial pressure is 8 mm Hg.  Inadequate tricuspid regurgitation Doppler envelope  precludes estimation of RVSP.  ------------------------------------------------------------------------  Conclusions:  1. Endocardial visualization enhanced with intravenous  injection of echo contrast (Definity).  Severe segmental  left ventricular systolic dysfunction. The anteroseptal,  inferoseptal, anterior walls and all apical segments are  akinetic.  No left ventricular thrombus. Swirling of  contrast suggests low flow.  2. Mild diastolic dysfunction (Stage I).  3. Trace pericardial effusion.  *** No previous Echo exam. Technically difficult (portable)  study.  ------------------------------------------------------------------------  Revised on 2/8/2018 - 9:14 AM by Major Remy M.D.  ------------------------------------------------------------------------  ---------------------------------------------------------------------------------------------------------------    MICROBIOLOGY:       RADIOLOGY:  [x ] Chest radiographs reviewed and interpreted by me    EXAM:  XR CHEST PA LAT 2V                          PROCEDURE DATE:  12/15/2019      INTERPRETATION:  CLINICAL INFORMATION: Shortness of breath.    EXAM: PA and lateral chest radiographs    COMPARISON: Chest radiograph from 2/10/2019    FINDINGS:  Left-sided AICD. The heart is slightly enlarged.  No focal consolidations. Trace pleural effusion and mild pulmonary   vascular congestion.. No pneumothorax.  The visualized osseous structures demonstrate no acute pathology.    IMPRESSION:  Trace pleural effusion and mild pulmonary vascular congestion.    ALFREDO FRANCE M.D., RADIOLOGY RESIDENT  This document has been electronically signed.  SLADE JIN M.D., ATTENDING RADIOLOGIST  This document has been electronically signed. Dec 16 2019  9:41AM  ---------------------------------------------------------------------------------------------------------------    EXAM:  CT ANGIO CHEST (W)AW IC                          PROCEDURE DATE:  12/15/2019      INTERPRETATION:  CLINICAL INFORMATION: Shortness of breath and elevated   d-dimer.  Evaluate for pulmonary embolism.    COMPARISON: CT chest from 11/11/2019..    PROCEDURE:   CT Angiography of the Chest.  90 ml of Omnipaque 350 was injected intravenously. 10 ml were discarded.  Sagittal and coronal reformats were performed as well as 3D (MIP)   reconstructions.    FINDINGS:    LUNGS AND AIRWAYS: Patent central airways.  Mild paraseptal emphysema in   the upper lobes.  Minor dependent atelectasis dependently in both lungs.    Interlobular septal thickening in the lung bases likely reflect   interstitial    PLEURA: Small pleural effusions bilaterally.    MEDIASTINUM AND BOB: No lymphadenopathy.    VESSELS: No pulmonary embolism.  Mild atherosclerotic calcification of   the proximal left subclavian artery.    HEART: Cardiomegaly with predominantly left heart enlargement.  An AICD   lead terminates in the right ventricle.  Multiple coronary artery stents   in place. Trace pericardial fluid.    CHEST WALL AND LOWER NECK: AICD battery in the left chest wall.  Trace   gynecomastia.    VISUALIZED UPPER ABDOMEN: Tiny hiatal hernia.  Diffuse hepatic steatosis.    The upper pole the right kidney is not imaged.    BONES: Within normal limits.    IMPRESSION:   1.  No pulmonary embolism.  2.  No gross CT evidence of pneumonia.  3.  Cardiomegaly with prominent left heart enlargement.  Interstitial  pulmonary edema.  Small pleural effusions bilaterally.  Correlate   clinically for congestive heart failure.  4.  Diffuse hepatic steatosis.    ELLIOTT RODGERS M.D., RADIOLOGY RESIDENT  This document has been electronically signed.  IRIS ZIMMER M.D.,ATTENDING RADIOLOGIST  This document has been electronically signed. Dec 15 2019  9:16PM  --------------------------------------------------------------------------------------------------------------- [facility] - CONSULT NOTE    HPI: 59 year old gentleman, recent heavy smoker, with history of a severe ischemic cardiomyopathy treated in the past with stents. He was recently diagnosed with COPD and started on Trelegy Ellipta and obstructive sleep apnea awaiting delivery of a home BIPAP system. A screening chest CT for lung cancer in November revealed no pulmonary nodules but mild interlobular septal thickening suggestive of pulmonary edema. The patient describes worsening shortness of breath with exertion over the last several months worsening markedly over the last several weeks. At present he is getting dyspneic while carrying objects on the flat, when climbing a flight of stairs or when laying flat. He has mild lower extremity swelling. He has no cough, sputum production, chest congestion or wheeze. No fevers, chills or sweats. The patient feels improved after diuretics, steroids and bronchodilators. He is awaiting an ECHO and interrogation of his AICD after 8 beats of WCT over night.     PMHX:  Hypertension  Hyperlipidemia  Diabetes  CAD  Ischemic cardiomyopathy  AICD (automatic cardioverter/defibrillator) present  Stented coronary artery    PSHX:  Percutaneous coronary intervention in 2018      FAMILY HISTORY:  COPD      SOCIAL HISTORY:  ; lives with wife; retired from [employer]; recent heavy smoker     Pulmonary Medications:   budesonide 160 MICROgram(s)/formoterol 4.5 MICROgram(s) Inhaler 2 Puff(s) Inhalation two times a day  tiotropium 18 MICROgram(s) Capsule 1 Capsule(s) Inhalation daily      Antimicrobials:      Cardiology:  furosemide   Injectable 20 milliGRAM(s) IV Push daily  lisinopril 5 milliGRAM(s) Oral daily  metoprolol succinate ER 50 milliGRAM(s) Oral daily  spironolactone 25 milliGRAM(s) Oral daily  tamsulosin 0.4 milliGRAM(s) Oral at bedtime      Other:  aspirin  chewable 81 milliGRAM(s) Oral daily  atorvastatin 40 milliGRAM(s) Oral at bedtime  dextrose 5%. 1000 milliLiter(s) IV Continuous <Continuous>  dextrose 50% Injectable 12.5 Gram(s) IV Push once  dextrose 50% Injectable 25 Gram(s) IV Push once  heparin  Injectable 5000 Unit(s) SubCutaneous every 12 hours  insulin glargine Injectable (LANTUS) 45 Unit(s) SubCutaneous at bedtime  insulin lispro (HumaLOG) corrective regimen sliding scale   SubCutaneous three times a day before meals  insulin lispro (HumaLOG) corrective regimen sliding scale   SubCutaneous at bedtime  insulin lispro Injectable (HumaLOG) 16 Unit(s) SubCutaneous three times a day before meals  nicotine - 21 mG/24Hr(s) Patch 1 patch Transdermal daily  ticagrelor 90 milliGRAM(s) Oral every 12 hours      Prn:  MEDICATIONS  (PRN):  acetaminophen   Tablet .. 650 milliGRAM(s) Oral every 4 hours PRN Mild Pain (1 - 3)  albuterol/ipratropium for Nebulization 3 milliLiter(s) Nebulizer every 6 hours PRN Shortness of Breath and/or Wheezing  dextrose 40% Gel 15 Gram(s) Oral once PRN Blood Glucose LESS THAN 70 milliGRAM(s)/deciliter  glucagon  Injectable 1 milliGRAM(s) IntraMuscular once PRN Glucose LESS THAN 70 milligrams/deciliter      Allergies    No Known Allergies    HOME MEDICATIONS: see  H & P    REVIEW OF SYSTEMS:  Constitutional: As per HPI  HEENT: Within normal limits  CV: As per HPI  Resp: As per HPI  GI: Within normal limits   : Within normal limits  Musculoskeletal: Within normal limits  Skin: Within normal limits  Neurological: Within normal limits  Psychiatric: Within normal limits  Endocrine: Within normal limits  Hematologic/Lymphatic: Within normal limits  Allergic/Immunologic: Within normal limits    [x] All other systems negative    OBJECTIVE:    Daily Height in cm: 195.58 (15 Dec 2019 17:14)      POCT Blood Glucose.: 347 mg/dL (16 Dec 2019 08:45)  POCT Blood Glucose.: 219 mg/dL (15 Dec 2019 23:15)      PHYSICAL EXAM:  ICU Vital Signs Last 24 Hrs  T(C): 36.4 (16 Dec 2019 07:49), Max: 36.7 (15 Dec 2019 18:45)  T(F): 97.5 (16 Dec 2019 07:49), Max: 98.1 (16 Dec 2019 01:10)  HR: 98 (16 Dec 2019 07:49) (87 - 100)  BP: 115/78 (16 Dec 2019 07:49) (106/70 - 130/82)  BP(mean): --  ABP: --  ABP(mean): --  RR: 18 (16 Dec 2019 07:49) (18 - 22)  SpO2: 96% (16 Dec 2019 07:49) (92% - 98%) on room air    General: Awake. Alert. Cooperative. No distress. Appears stated age 	  HEENT: Atraumatic. Normocephalic. Anicteric. Normal oral mucosa. PERRL. EOMI. Mallampati class IV airway.  Neck: Supple. Trachea midline. Thyroid without enlargement/tenderness/nodules. No carotid bruit. No JVD.	  Cardiovascular: Regular rate and rhythm. S1 S2 normal. No murmurs, rubs or gallops.  Respiratory: Respirations unlabored. Scattered rales. No curvature.  Abdomen: Soft. Non-tender. Non-distended. No organomegaly. No masses. Normal bowel sounds.  Extremities: Warm to touch. No clubbing or cyanosis. Mild pretibial edema.  Pulses: 2+ peripheral pulses all extremities.	  Skin: Normal skin color. No rashes or lesions. No ecchymoses. No cyanosis. Warm to touch.  Lymph Nodes: Cervical, supraclavicular and axillary nodes normal  Neurological: Motor and sensory examination equal and normal. A and O x 3  Psychiatry: Appropriate mood and affect.      LABS:                          12.0   9.83  )-----------( 166      ( 16 Dec 2019 08:15 )             37.9     CBC    WBC  9.83 <==, 7.73 <==    Hemoglobin  12.0 <<==, 12.2 <<==    Hematocrit  37.9 <==, 39.5 <==    Platelets  166 <==, 200 <==      134<L>  |  100  |  32<H>  ----------------------------<  347<H>    12-16  4.5   |  19<L>  |  1.79<H>    LYTES    sodium  134 <==, 138 <==    potassium   4.5 <==, 3.9 <==    chloride  100 <==, 105 <==    carbon dioxide  19 <==, 20 <==    =============================================================================================  RENAL FUNCTION:    Creatinine:   1.79  <<==, 1.75  <<==    BUN:   32 <==, 26 <==    ============================================================================================    calcium   9.7 <==, 9.7 <==    phos   3.8 <==    mag   2.0 <==    ============================================================================================  LFTs    AST:   19 <== , 24 <==     ALT:  32  <== , 35  <==     AP:  66  <=, 67  <=    Bili:  0.4  <=, 0.3  <=    PT/INR - ( 15 Dec 2019 18:45 )   PT: 12.2 sec;   INR: 1.07 ratio       PTT - ( 15 Dec 2019 18:45 )  PTT: 31.9 sec    Serum Pro-Brain Natriuretic Peptide: 432 pg/mL (12-15 @ 18:45)    D-Dimer Assay, Quantitative: 285 ng/mL DDU (12-15 @ 18:45)    CARDIAC MARKERS ( 15 Dec 2019 20:34 )  CPK x     /CKMB x     /CKMB Units x        troponin 32 ng/L    CARDIAC MARKERS ( 15 Dec 2019 18:45 )  CPK x     /CKMB x     /CKMB Units x        troponin 33 ng/L      Patient name: SEVERIANO MARTINEZ  YOB: 1960   Age: 57 (M)   MR#: [id number]  Study Date: 2/8/2018  [id number] Radha Springer RD[facility]  Study quality: Technically difficult  Referring Physician: Kylah Amaral MD  Blood Pressure: 90/67 mmHg  Height: 193 cm  Weight: 113 kg  BSA: 2.4 m2  ------------------------------------------------------------------------  PROCEDURE: Transthoracic echocardiogram with 2-D, M-Mode  and complete spectral and color flow Doppler. Verbal  consent was obtained for injection of echo contrast  following a discussion of risks and benefits. Following  intravenous injection of contrast, harmonic imaging was  performed.  INDICATION: Subsequent ST elevation (STEMI) myocardial  infarction of anterior wall (I22.0)  ------------------------------------------------------------------------  Dimensions:    Normal Values:  LA:     4.0    2.0 - 4.0 cm  Ao:     3.5    2.0 - 3.8 cm  SEPTUM: 1.0    0.6 - 1.2 cm  PWT:    1.2    0.6 - 1.1 cm  LVIDd:  6.5    3.0 - 5.6 cm  LVIDs:  5.3    1.8 - 4.0 cm  Derived variables:  LVMI: 132 g/m2  RWT: 0.36  EF (Visual Estimate): 20-25 %  ------------------------------------------------------------------------  Observations:  Mitral Valve: Grossly normal mitral valve. Minimal mitral  regurgitation.  Aortic Valve/Aorta: Trileaflet aortic valve. Mean  transaortic valve gradient equals 2 mm Hg, aortic valve  velocity time integral equals 20 cm. No aortic valve  regurgitation seen. Mean gradient is equal to 1 mm Hg, LVOT  velocity time integral equals 11 cm.  Normal aortic root size. (Ao: 3.5 cm at the sinuses of  Valsalva).  Left Atrium: Left atrium not well visualized.  Left Ventricle: Endocardial visualization enhanced with  intravenous injection of echo contrast (Definity).  Severe  segmental left ventricular systolic dysfunction. The  anteroseptal, inferoseptal, anterior walls and all apical  segments are akinetic.  No left ventricular thrombus.  Swirling of contrast suggests low flow.  Moderate left  ventricular enlargement. Mild diastolic dysfunction (Stage  I).  Right Heart: Normal right atrium. The right ventricle is  not well visualized; grossly normal right ventricular  systolic function. TAPSE > 2.0 cm (normal).  Tricuspid  valve not well visualized. Pulmonic valve not well  visualized, probably normal.  Pericardium/Pleura: Trace pericardial effusion.  Hemodynamic: Estimated right atrial pressure is 8 mm Hg.  Inadequate tricuspid regurgitation Doppler envelope  precludes estimation of RVSP.  ------------------------------------------------------------------------  Conclusions:  1. Endocardial visualization enhanced with intravenous  injection of echo contrast (Definity).  Severe segmental  left ventricular systolic dysfunction. The anteroseptal,  inferoseptal, anterior walls and all apical segments are  akinetic.  No left ventricular thrombus. Swirling of  contrast suggests low flow.  2. Mild diastolic dysfunction (Stage I).  3. Trace pericardial effusion.  *** No previous Echo exam. Technically difficult (portable)  study.  ------------------------------------------------------------------------  Revised on 2/8/2018 - 9:14 AM by Mjaor Remy M.D.  ------------------------------------------------------------------------  ---------------------------------------------------------------------------------------------------------------    MICROBIOLOGY:       RADIOLOGY:  [x ] Chest radiographs reviewed and interpreted by me    EXAM:  XR CHEST PA LAT 2V                          PROCEDURE DATE:  12/15/2019      INTERPRETATION:  CLINICAL INFORMATION: Shortness of breath.    EXAM: PA and lateral chest radiographs    COMPARISON: Chest radiograph from 2/10/2019    FINDINGS:  Left-sided AICD. The heart is slightly enlarged.  No focal consolidations. Trace pleural effusion and mild pulmonary   vascular congestion.. No pneumothorax.  The visualized osseous structures demonstrate no acute pathology.    IMPRESSION:  Trace pleural effusion and mild pulmonary vascular congestion.    ALFREDO FRANCE M.D., RADIOLOGY RESIDENT  This document has been electronically signed.  SLADE JIN M.D., ATTENDING RADIOLOGIST  This document has been electronically signed. Dec 16 2019  9:41AM  ---------------------------------------------------------------------------------------------------------------    EXAM:  CT ANGIO CHEST (W)AW IC                          PROCEDURE DATE:  12/15/2019      INTERPRETATION:  CLINICAL INFORMATION: Shortness of breath and elevated   d-dimer.  Evaluate for pulmonary embolism.    COMPARISON: CT chest from 11/11/2019..    PROCEDURE:   CT Angiography of the Chest.  90 ml of Omnipaque 350 was injected intravenously. 10 ml were discarded.  Sagittal and coronal reformats were performed as well as 3D (MIP)   reconstructions.    FINDINGS:    LUNGS AND AIRWAYS: Patent central airways.  Mild paraseptal emphysema in   the upper lobes.  Minor dependent atelectasis dependently in both lungs.    Interlobular septal thickening in the lung bases likely reflect   interstitial    PLEURA: Small pleural effusions bilaterally.    MEDIASTINUM AND BOB: No lymphadenopathy.    VESSELS: No pulmonary embolism.  Mild atherosclerotic calcification of   the proximal left subclavian artery.    HEART: Cardiomegaly with predominantly left heart enlargement.  An AICD   lead terminates in the right ventricle.  Multiple coronary artery stents   in place. Trace pericardial fluid.    CHEST WALL AND LOWER NECK: AICD battery in the left chest wall.  Trace   gynecomastia.    VISUALIZED UPPER ABDOMEN: Tiny hiatal hernia.  Diffuse hepatic steatosis.    The upper pole the right kidney is not imaged.    BONES: Within normal limits.    IMPRESSION:   1.  No pulmonary embolism.  2.  No gross CT evidence of pneumonia.  3.  Cardiomegaly with prominent left heart enlargement.  Interstitial  pulmonary edema.  Small pleural effusions bilaterally.  Correlate   clinically for congestive heart failure.  4.  Diffuse hepatic steatosis.    ELLIOTT RODGERS M.D., RADIOLOGY RESIDENT  This document has been electronically signed.  IRIS ZIMMER M.D.,ATTENDING RADIOLOGIST  This document has been electronically signed. Dec 15 2019  9:16PM  ---------------------------------------------------------------------------------------------------------------

## 2019-12-16 NOTE — CONSULT NOTE ADULT - PROBLEM SELECTOR RECOMMENDATION 9
Will increase Lantus to 45u at bedtime, increase Humalog to 16u before each meal, and continue coverage scale.. Will continue monitoring FS, log, and FU.  Patient counseled for compliance with consistent low carb diet and exercise as tolerated outpatient. Patient counseled for compliance with consistent low carb diet and exercise as tolerated outpatient.

## 2019-12-16 NOTE — PROGRESS NOTE ADULT - SUBJECTIVE AND OBJECTIVE BOX
SEVERIANO MARTINEZ  59y Male  MRN:[id number]    Patient is a 59y old  Male who presents with a chief complaint of SOB x 2 weeks (15 Dec 2019 22:34)    HPI:  Patient is a 60 y/o male w/pmh T2DM, HTN, CAD/MI s/p stents (most recent 2/2018), HFrEF, extensive smoking history quit about two weeks ago, newly diagnosed COPD and currently on trelegy, presents for worsened dyspnea on exertion over the past two weeks.   Patient reports , shortness of breath after a few steps, he was previously able to walk about a block . He was evaluated by pulmonary and diagnosed with mild COPD and started on Trelegy , however symptoms continued to worsen. He reports mild lower extremity edema . He reports increased orthopnea , but no PND episodes. He reports a non productive cough , no wheezing. He has no fever or chills , no flu-like symptoms. on day of admission he had one episode of chest pain  which occurred while laying down after coughing, pressure like, located in the substernal region , centrally located , lasted a few seconds and self resolved. (15 Dec 2019 22:34)      Patient seen and evaluated at bedside. No acute events overnight except as noted.    Interval HPI: reports to be feeling better today    PAST MEDICAL & SURGICAL HISTORY:  AICD (automatic cardioverter/defibrillator) present  Diabetes  Stented coronary artery  No significant past surgical history      REVIEW OF SYSTEMS:  as per hpi    VITALS:  Vital Signs Last 24 Hrs  T(C): 36.4 (16 Dec 2019 07:49), Max: 36.7 (15 Dec 2019 18:45)  T(F): 97.5 (16 Dec 2019 07:49), Max: 98.1 (16 Dec 2019 01:10)  HR: 98 (16 Dec 2019 07:49) (87 - 100)  BP: 115/78 (16 Dec 2019 07:49) (106/70 - 130/82)  BP(mean): --  RR: 18 (16 Dec 2019 07:49) (18 - 22)  SpO2: 96% (16 Dec 2019 07:49) (92% - 98%)  CAPILLARY BLOOD GLUCOSE      POCT Blood Glucose.: 347 mg/dL (16 Dec 2019 08:45)  POCT Blood Glucose.: 219 mg/dL (15 Dec 2019 23:15)    I&O's Summary      PHYSICAL EXAM:  GENERAL: NAD, well-developed  HEAD:  Atraumatic, Normocephalic  EYES: EOMI, PERRLA, conjunctiva and sclera clear  NECK: Supple, No JVD  CHEST/LUNG: Clear to auscultation bilaterally; No wheeze  HEART: S1, S2; No murmurs, rubs, or gallops  ABDOMEN: Soft, Nontender, Nondistended; Bowel sounds present  EXTREMITIES:  2+ Peripheral Pulses, No clubbing, cyanosis, or edema  PSYCH: Normal affect  NEUROLOGY: AAOX3; non-focal  SKIN: No rashes or lesions    Consultant(s) Notes Reviewed:  [x ] YES  [ ] NO  Care Discussed with Consultants/Other Providers [ x] YES  [ ] NO    MEDS:  MEDICATIONS  (STANDING):  aspirin  chewable 81 milliGRAM(s) Oral daily  atorvastatin 40 milliGRAM(s) Oral at bedtime  budesonide 160 MICROgram(s)/formoterol 4.5 MICROgram(s) Inhaler 2 Puff(s) Inhalation two times a day  dextrose 5%. 1000 milliLiter(s) (50 mL/Hr) IV Continuous <Continuous>  dextrose 50% Injectable 12.5 Gram(s) IV Push once  dextrose 50% Injectable 25 Gram(s) IV Push once  furosemide   Injectable 20 milliGRAM(s) IV Push daily  heparin  Injectable 5000 Unit(s) SubCutaneous every 12 hours  insulin glargine Injectable (LANTUS) 45 Unit(s) SubCutaneous at bedtime  insulin lispro (HumaLOG) corrective regimen sliding scale   SubCutaneous three times a day before meals  insulin lispro (HumaLOG) corrective regimen sliding scale   SubCutaneous at bedtime  insulin lispro Injectable (HumaLOG) 16 Unit(s) SubCutaneous three times a day before meals  lisinopril 5 milliGRAM(s) Oral daily  metoprolol succinate ER 50 milliGRAM(s) Oral daily  nicotine - 21 mG/24Hr(s) Patch 1 patch Transdermal daily  spironolactone 25 milliGRAM(s) Oral daily  tamsulosin 0.4 milliGRAM(s) Oral at bedtime  ticagrelor 90 milliGRAM(s) Oral every 12 hours  tiotropium 18 MICROgram(s) Capsule 1 Capsule(s) Inhalation daily    MEDICATIONS  (PRN):  acetaminophen   Tablet .. 650 milliGRAM(s) Oral every 4 hours PRN Mild Pain (1 - 3)  albuterol/ipratropium for Nebulization 3 milliLiter(s) Nebulizer every 6 hours PRN Shortness of Breath and/or Wheezing  dextrose 40% Gel 15 Gram(s) Oral once PRN Blood Glucose LESS THAN 70 milliGRAM(s)/deciliter  glucagon  Injectable 1 milliGRAM(s) IntraMuscular once PRN Glucose LESS THAN 70 milligrams/deciliter    ALLERGIES:  No Known Allergies      LABS:                        12.0   9.83  )-----------( 166      ( 16 Dec 2019 08:15 )             37.9     12-16    134<L>  |  100  |  32<H>  ----------------------------<  347<H>  4.5   |  19<L>  |  1.79<H>    Ca    9.7      16 Dec 2019 06:16  Phos  3.8     12-16  Mg     2.0     12-16    TPro  7.6  /  Alb  4.3  /  TBili  0.4  /  DBili  x   /  AST  19  /  ALT  32  /  AlkPhos  66  12-16    PT/INR - ( 15 Dec 2019 18:45 )   PT: 12.2 sec;   INR: 1.07 ratio         PTT - ( 15 Dec 2019 18:45 )  PTT:31.9 sec      LIVER FUNCTIONS - ( 16 Dec 2019 06:16 )  Alb: 4.3 g/dL / Pro: 7.6 g/dL / ALK PHOS: 66 U/L / ALT: 32 U/L / AST: 19 U/L / GGT: x                A1c:Hemoglobin A1C, Whole Blood: 8.9 % (12-16 @ 08:15)    BNP:Serum Pro-Brain Natriuretic Peptide: 432 pg/mL (12-15 @ 18:45)

## 2019-12-16 NOTE — PROVIDER CONTACT NOTE (OTHER) - ACTION/TREATMENT ORDERED:
PA notified. Continue to monitor on tele. Electrolytes will be drawn in the AM. Will continue to monitor.

## 2019-12-17 LAB
ANION GAP SERPL CALC-SCNC: 14 MMOL/L — SIGNIFICANT CHANGE UP (ref 5–17)
BUN SERPL-MCNC: 38 MG/DL — HIGH (ref 7–23)
CALCIUM SERPL-MCNC: 9.7 MG/DL — SIGNIFICANT CHANGE UP (ref 8.4–10.5)
CHLORIDE SERPL-SCNC: 100 MMOL/L — SIGNIFICANT CHANGE UP (ref 96–108)
CO2 SERPL-SCNC: 20 MMOL/L — LOW (ref 22–31)
CREAT SERPL-MCNC: 1.58 MG/DL — HIGH (ref 0.5–1.3)
GLUCOSE BLDC GLUCOMTR-MCNC: 128 MG/DL — HIGH (ref 70–99)
GLUCOSE BLDC GLUCOMTR-MCNC: 157 MG/DL — HIGH (ref 70–99)
GLUCOSE BLDC GLUCOMTR-MCNC: 229 MG/DL — HIGH (ref 70–99)
GLUCOSE BLDC GLUCOMTR-MCNC: 229 MG/DL — HIGH (ref 70–99)
GLUCOSE SERPL-MCNC: 237 MG/DL — HIGH (ref 70–99)
HCT VFR BLD CALC: 38.6 % — LOW (ref 39–50)
HGB BLD-MCNC: 12.3 G/DL — LOW (ref 13–17)
MCHC RBC-ENTMCNC: 25.7 PG — LOW (ref 27–34)
MCHC RBC-ENTMCNC: 31.9 GM/DL — LOW (ref 32–36)
MCV RBC AUTO: 80.8 FL — SIGNIFICANT CHANGE UP (ref 80–100)
PLATELET # BLD AUTO: 200 K/UL — SIGNIFICANT CHANGE UP (ref 150–400)
POTASSIUM SERPL-MCNC: 3.9 MMOL/L — SIGNIFICANT CHANGE UP (ref 3.5–5.3)
POTASSIUM SERPL-SCNC: 3.9 MMOL/L — SIGNIFICANT CHANGE UP (ref 3.5–5.3)
RBC # BLD: 4.78 M/UL — SIGNIFICANT CHANGE UP (ref 4.2–5.8)
RBC # FLD: 15.9 % — HIGH (ref 10.3–14.5)
SODIUM SERPL-SCNC: 134 MMOL/L — LOW (ref 135–145)
WBC # BLD: 9.26 K/UL — SIGNIFICANT CHANGE UP (ref 3.8–10.5)
WBC # FLD AUTO: 9.26 K/UL — SIGNIFICANT CHANGE UP (ref 3.8–10.5)

## 2019-12-17 PROCEDURE — 93282 PRGRMG EVAL IMPLANTABLE DFB: CPT | Mod: 26

## 2019-12-17 PROCEDURE — 94010 BREATHING CAPACITY TEST: CPT | Mod: 26

## 2019-12-17 PROCEDURE — 93306 TTE W/DOPPLER COMPLETE: CPT | Mod: 26

## 2019-12-17 PROCEDURE — 99233 SBSQ HOSP IP/OBS HIGH 50: CPT

## 2019-12-17 RX ORDER — INSULIN GLARGINE 100 [IU]/ML
50 INJECTION, SOLUTION SUBCUTANEOUS AT BEDTIME
Refills: 0 | Status: DISCONTINUED | OUTPATIENT
Start: 2019-12-17 | End: 2019-12-18

## 2019-12-17 RX ADMIN — Medication 16 UNIT(S): at 12:04

## 2019-12-17 RX ADMIN — Medication 20 MILLIGRAM(S): at 05:56

## 2019-12-17 RX ADMIN — Medication 3 MILLILITER(S): at 08:13

## 2019-12-17 RX ADMIN — SPIRONOLACTONE 25 MILLIGRAM(S): 25 TABLET, FILM COATED ORAL at 05:56

## 2019-12-17 RX ADMIN — Medication 1 PATCH: at 12:04

## 2019-12-17 RX ADMIN — Medication 1 PATCH: at 08:15

## 2019-12-17 RX ADMIN — TAMSULOSIN HYDROCHLORIDE 0.4 MILLIGRAM(S): 0.4 CAPSULE ORAL at 21:36

## 2019-12-17 RX ADMIN — INSULIN GLARGINE 50 UNIT(S): 100 INJECTION, SOLUTION SUBCUTANEOUS at 21:37

## 2019-12-17 RX ADMIN — Medication 81 MILLIGRAM(S): at 12:04

## 2019-12-17 RX ADMIN — Medication 1 PATCH: at 20:11

## 2019-12-17 RX ADMIN — Medication 2: at 08:06

## 2019-12-17 RX ADMIN — ATORVASTATIN CALCIUM 40 MILLIGRAM(S): 80 TABLET, FILM COATED ORAL at 21:36

## 2019-12-17 RX ADMIN — Medication 1 PATCH: at 06:03

## 2019-12-17 RX ADMIN — Medication 1: at 12:03

## 2019-12-17 RX ADMIN — TICAGRELOR 90 MILLIGRAM(S): 90 TABLET ORAL at 17:22

## 2019-12-17 RX ADMIN — Medication 50 MILLIGRAM(S): at 05:57

## 2019-12-17 RX ADMIN — TICAGRELOR 90 MILLIGRAM(S): 90 TABLET ORAL at 05:57

## 2019-12-17 RX ADMIN — Medication 16 UNIT(S): at 08:06

## 2019-12-17 RX ADMIN — Medication 16 UNIT(S): at 17:22

## 2019-12-17 NOTE — PROGRESS NOTE ADULT - ASSESSMENT
59 year-old gentleman with cardiovascular history as above including ischemic cardiomyopathy now presents with acute on chronic systolic heart failure in the setting of recent dietary indiscretions.  Interrogation of [place] ICD to evaluate for burden of VT showed no sustained episodes.    Continue DAPT, high intensity statin, and beta-blocker at home dose.  Hold ACEi - plan to transition to Entresto if tolerated and covered by insurance.    Diuresis as tolerated.  Keep O > I, K > 4.0, Mag > 2.0  Check daily standing weights.    Recommend counselling with nutritionist regarding low salt diet.    Plan for left heart cath to assess coronary arteries in patient with new decline in LVEF when renal function is optimized.

## 2019-12-17 NOTE — PROGRESS NOTE ADULT - ASSESSMENT
Assessment  DMT2: 59y Male with DM T2 with hyperglycemia, A1C 8.9% ,  was on insulin at home (Lantus 50u at bedtime, Humalog 20u before meals), now on basal bolus insulin, increased dose yesterday, blood sugars running high in the AM (237, 229) and within overall acceptable range post-prandially, no hypoglycemic episode. Patient is eating full meals with good appetite, non compliant with low carb diet, appears comfortable and in good spirits.  HF: on medications, no chest pain, stable, monitored.  COPD: On management, stable, FU Pulm.  MANJIT: Monitor labs/BMP  Obesity: No strict exercise routines, not on any weight loss program, neither on low calorie diet.          Cortney Flanagan MD  Cell: [phone]  Office: [phone] Assessment  DMT2: 59y Male with DM T2 with hyperglycemia, A1C 8.9% ,  was on insulin at home (Lantus 50u at bedtime, Humalog 20u before meals), now on basal bolus insulin, increased dose yesterday,  blood sugars running high in the AM (237, 229) and within overall acceptable range post-prandially, no hypoglycemic episode. Patient is eating full meals with good appetite, non compliant with low carb diet, appears comfortable and in good spirits.  HF: on medications, no chest pain, stable, monitored.  COPD: On management, stable, FU Pulm.  MANJIT: Monitor labs/BMP  Obesity: No strict exercise routines, not on any weight loss program, neither on low calorie diet.          Cortney Flanagan MD  Cell: [phone]  Office: [phone]

## 2019-12-17 NOTE — PROGRESS NOTE ADULT - SUBJECTIVE AND OBJECTIVE BOX
Chief complaint  Patient is a 59y old  Male who presents with a chief complaint of SOB x 2 weeks (17 Dec 2019 15:55)   Review of systems  Patient in bed, looks comfortable, no fever, no hypoglycemia.    Labs and Fingersticks  CAPILLARY BLOOD GLUCOSE      POCT Blood Glucose.: 157 mg/dL (17 Dec 2019 11:47)  POCT Blood Glucose.: 229 mg/dL (17 Dec 2019 07:50)  POCT Blood Glucose.: 210 mg/dL (16 Dec 2019 21:21)  POCT Blood Glucose.: 165 mg/dL (16 Dec 2019 18:58)      Anion Gap, Serum: 14 (12-17 @ 05:15)  Anion Gap, Serum: 15 (12-16 @ 06:16)  Anion Gap, Serum: 13 (12-15 @ 18:45)    Hemoglobin A1C, Whole Blood: 8.9 <H> (12-16 @ 08:15)    Calcium, Total Serum: 9.7 (12-17 @ 05:15)  Calcium, Total Serum: 9.7 (12-16 @ 06:16)  Calcium, Total Serum: 9.7 (12-15 @ 18:45)  Albumin, Serum: 4.3 (12-16 @ 06:16)  Albumin, Serum: 4.0 (12-15 @ 18:45)    Alanine Aminotransferase (ALT/SGPT): 32 (12-16 @ 06:16)  Alanine Aminotransferase (ALT/SGPT): 35 (12-15 @ 18:45)  Alkaline Phosphatase, Serum: 66 (12-16 @ 06:16)  Alkaline Phosphatase, Serum: 67 (12-15 @ 18:45)  Aspartate Aminotransferase (AST/SGOT): 19 (12-16 @ 06:16)  Aspartate Aminotransferase (AST/SGOT): 24 (12-15 @ 18:45)        12-17    134<L>  |  100  |  38<H>  ----------------------------<  237<H>  3.9   |  20<L>  |  1.58<H>    Ca    9.7      17 Dec 2019 05:15  Phos  3.8     12-16  Mg     2.0     12-16    TPro  7.6  /  Alb  4.3  /  TBili  0.4  /  DBili  x   /  AST  19  /  ALT  32  /  AlkPhos  66  12-16                        12.3   9.26  )-----------( 200      ( 17 Dec 2019 08:48 )             38.6     Medications  MEDICATIONS  (STANDING):  aspirin  chewable 81 milliGRAM(s) Oral daily  atorvastatin 40 milliGRAM(s) Oral at bedtime  budesonide 160 MICROgram(s)/formoterol 4.5 MICROgram(s) Inhaler 2 Puff(s) Inhalation two times a day  dextrose 5%. 1000 milliLiter(s) (50 mL/Hr) IV Continuous <Continuous>  dextrose 50% Injectable 12.5 Gram(s) IV Push once  dextrose 50% Injectable 25 Gram(s) IV Push once  furosemide   Injectable 20 milliGRAM(s) IV Push daily  heparin  Injectable 5000 Unit(s) SubCutaneous every 12 hours  insulin glargine Injectable (LANTUS) 50 Unit(s) SubCutaneous at bedtime  insulin lispro (HumaLOG) corrective regimen sliding scale   SubCutaneous three times a day before meals  insulin lispro (HumaLOG) corrective regimen sliding scale   SubCutaneous at bedtime  insulin lispro Injectable (HumaLOG) 16 Unit(s) SubCutaneous three times a day before meals  metoprolol succinate ER 50 milliGRAM(s) Oral daily  nicotine - 21 mG/24Hr(s) Patch 1 patch Transdermal daily  spironolactone 25 milliGRAM(s) Oral daily  tamsulosin 0.4 milliGRAM(s) Oral at bedtime  ticagrelor 90 milliGRAM(s) Oral every 12 hours  tiotropium 18 MICROgram(s) Capsule 1 Capsule(s) Inhalation daily      Physical Exam  General: Patient comfortable in bed  Vital Signs Last 12 Hrs  T(F): 98 (12-17-19 @ 11:20), Max: 98 (12-17-19 @ 11:20)  HR: 102 (12-17-19 @ 11:20) (101 - 102)  BP: 127/80 (12-17-19 @ 11:20) (117/69 - 127/80)  BP(mean): --  RR: 18 (12-17-19 @ 11:20) (18 - 18)  SpO2: 97% (12-17-19 @ 11:20) (97% - 98%)  Neck: No palpable thyroid nodules.  CVS: S1S2, No murmurs  Respiratory: No wheezing, no crepitations  GI: Abdomen soft, bowel sounds positive  Musculoskeletal:  edema lower extremities.   Skin: No skin rashes, no ecchymosis    Diagnostics Chief complaint  Patient is a 59y old  Male who presents with a chief complaint of SOB x 2 weeks (17 Dec 2019 15:55)   Review of systems  Patient in bed, looks comfortable, no fever, no  hypoglycemia.    Labs and Fingersticks  CAPILLARY BLOOD GLUCOSE      POCT Blood Glucose.: 157 mg/dL (17 Dec 2019 11:47)  POCT Blood Glucose.: 229 mg/dL (17 Dec 2019 07:50)  POCT Blood Glucose.: 210 mg/dL (16 Dec 2019 21:21)  POCT Blood Glucose.: 165 mg/dL (16 Dec 2019 18:58)      Anion Gap, Serum: 14 (12-17 @ 05:15)  Anion Gap, Serum: 15 (12-16 @ 06:16)  Anion Gap, Serum: 13 (12-15 @ 18:45)    Hemoglobin A1C, Whole Blood: 8.9 <H> (12-16 @ 08:15)    Calcium, Total Serum: 9.7 (12-17 @ 05:15)  Calcium, Total Serum: 9.7 (12-16 @ 06:16)  Calcium, Total Serum: 9.7 (12-15 @ 18:45)  Albumin, Serum: 4.3 (12-16 @ 06:16)  Albumin, Serum: 4.0 (12-15 @ 18:45)    Alanine Aminotransferase (ALT/SGPT): 32 (12-16 @ 06:16)  Alanine Aminotransferase (ALT/SGPT): 35 (12-15 @ 18:45)  Alkaline Phosphatase, Serum: 66 (12-16 @ 06:16)  Alkaline Phosphatase, Serum: 67 (12-15 @ 18:45)  Aspartate Aminotransferase (AST/SGOT): 19 (12-16 @ 06:16)  Aspartate Aminotransferase (AST/SGOT): 24 (12-15 @ 18:45)        12-17    134<L>  |  100  |  38<H>  ----------------------------<  237<H>  3.9   |  20<L>  |  1.58<H>    Ca    9.7      17 Dec 2019 05:15  Phos  3.8     12-16  Mg     2.0     12-16    TPro  7.6  /  Alb  4.3  /  TBili  0.4  /  DBili  x   /  AST  19  /  ALT  32  /  AlkPhos  66  12-16                        12.3   9.26  )-----------( 200      ( 17 Dec 2019 08:48 )             38.6     Medications  MEDICATIONS  (STANDING):  aspirin  chewable 81 milliGRAM(s) Oral daily  atorvastatin 40 milliGRAM(s) Oral at bedtime  budesonide 160 MICROgram(s)/formoterol 4.5 MICROgram(s) Inhaler 2 Puff(s) Inhalation two times a day  dextrose 5%. 1000 milliLiter(s) (50 mL/Hr) IV Continuous <Continuous>  dextrose 50% Injectable 12.5 Gram(s) IV Push once  dextrose 50% Injectable 25 Gram(s) IV Push once  furosemide   Injectable 20 milliGRAM(s) IV Push daily  heparin  Injectable 5000 Unit(s) SubCutaneous every 12 hours  insulin glargine Injectable (LANTUS) 50 Unit(s) SubCutaneous at bedtime  insulin lispro (HumaLOG) corrective regimen sliding scale   SubCutaneous three times a day before meals  insulin lispro (HumaLOG) corrective regimen sliding scale   SubCutaneous at bedtime  insulin lispro Injectable (HumaLOG) 16 Unit(s) SubCutaneous three times a day before meals  metoprolol succinate ER 50 milliGRAM(s) Oral daily  nicotine - 21 mG/24Hr(s) Patch 1 patch Transdermal daily  spironolactone 25 milliGRAM(s) Oral daily  tamsulosin 0.4 milliGRAM(s) Oral at bedtime  ticagrelor 90 milliGRAM(s) Oral every 12 hours  tiotropium 18 MICROgram(s) Capsule 1 Capsule(s) Inhalation daily      Physical Exam  General: Patient comfortable in bed  Vital Signs Last 12 Hrs  T(F): 98 (12-17-19 @ 11:20), Max: 98 (12-17-19 @ 11:20)  HR: 102 (12-17-19 @ 11:20) (101 - 102)  BP: 127/80 (12-17-19 @ 11:20) (117/69 - 127/80)  BP(mean): --  RR: 18 (12-17-19 @ 11:20) (18 - 18)  SpO2: 97% (12-17-19 @ 11:20) (97% - 98%)  Neck: No palpable thyroid nodules.  CVS: S1S2, No murmurs  Respiratory: No wheezing, no crepitations  GI: Abdomen soft, bowel sounds positive  Musculoskeletal:  edema lower extremities.   Skin: No skin rashes, no ecchymosis    Diagnostics

## 2019-12-17 NOTE — PROCEDURE NOTE - ADDITIONAL PROCEDURE DETAILS
1. Battery longevity 3.3- 3.6 years  2. Lead impedance stable  3. Sensing and pacing thresholds adequate  4. No ventricular tachyarrhythmia detection noted  5. One episode of SVT last April 2109 stored lasting few seconds  6. Tele in Sinus Rhythm; Vpaced <1%, not PM dependent  7. Normal ICD function    #[id number]

## 2019-12-17 NOTE — PROGRESS NOTE ADULT - ASSESSMENT
59  M w/pmh T2DM, HTN, CAD/MI s/p stents (most recent 2/2018), HFrEF ( EF 25%) , extensive smoking history quit about two weeks ago, COPD, p/w GREWAL x 2 weeks.   admitted with acute on chronic systolic heart failure and copd exac      CHF exac  acute on chronic systolic  cards following  cont diuresis with lasix 40 iv daily  strict i/o  daily wt  tele  echo noted  acs ruled out  check aicd   plan to start entresto as per cards     copd exac  pulm consulted  nebs  inhalers    DM  uncontrolled a1c 8.9  endo consulted  insulin as per endo    cad s/p pci  cont asa/brilinta/statin/bb    servando on ckd  renal consulted  avoid nephrotoxins  monitor creat

## 2019-12-17 NOTE — CONSULT NOTE ADULT - ASSESSMENT
60 y/o male w/pmh T2DM, HTN, CAD/MI, ischemic cardiomyopathy,  s/p stents (most recent 2/2018), HFrEF, extensive smoking history quit about two weeks ago, newly diagnosed COPD and currently on trelegy, presented for worsened dyspnea on exertion over the past two weeks.  on day of admission he had one episode of chest pain  which occurred while laying down after coughing, pressure like, located in the substernal region , centrally located , lasted a few seconds and self resolved.  dx with chf. started on lasix. on admission cr also noted to be elevated.     1- CKD III   2- ischemic cardiomyopathy   3- HTN   4- BPH      creatinine seems to be returning to baseline and likely was elevated in setting of chf  to have urine protein/cr ratio   lasix 20 mg ivp and aldactone 25 mg qd  suspect ckd III in setting of ICM   cont flomax 0.4 mg qd

## 2019-12-17 NOTE — PROGRESS NOTE ADULT - PROBLEM SELECTOR PLAN 1
Will increase Lantus to 50u at bedtime.  Will continue Humalog 16u before each meal as well as coverage scale. Will continue monitoring FS, log, and FU.  Patient counseled for compliance with consistent low carb diet and exercise as tolerated outpatient. Patient counseled for compliance with consistent low carb diet and exercise as tolerated outpatient.

## 2019-12-17 NOTE — CONSULT NOTE ADULT - SUBJECTIVE AND OBJECTIVE BOX
[facility]  [phone]  NEPHROLOGY      INITIAL CONSULT NOTE  --------------------------------------------------------------------------------  HPI:    60 y/o male w/pmh T2DM, HTN, CAD/MI, ischemic cardiomyopathy,  s/p stents (most recent 2/2018), HFrEF, extensive smoking history quit about two weeks ago, newly diagnosed COPD and currently on trelegy, presented for worsened dyspnea on exertion over the past two weeks.  on day of admission he had one episode of chest pain  which occurred while laying down after coughing, pressure like, located in the substernal region , centrally located , lasted a few seconds and self resolved. admitted, dx with chf. started on lasix. on admission cr also noted to be elevated. renal consult called.       PAST HISTORY  --------------------------------------------------------------------------------  PAST MEDICAL & SURGICAL HISTORY:  AICD (automatic cardioverter/defibrillator) present  Diabetes  Stented coronary artery  No significant past surgical history    FAMILY HISTORY:  Family history of COPD (chronic obstructive pulmonary disease) (Sibling)    PAST SOCIAL HISTORY: past tobacco past alcohol     ALLERGIES & MEDICATIONS  --------------------------------------------------------------------------------  Allergies    No Known Allergies    Intolerances      Standing Inpatient Medications  aspirin  chewable 81 milliGRAM(s) Oral daily  atorvastatin 40 milliGRAM(s) Oral at bedtime  budesonide 160 MICROgram(s)/formoterol 4.5 MICROgram(s) Inhaler 2 Puff(s) Inhalation two times a day  dextrose 5%. 1000 milliLiter(s) IV Continuous <Continuous>  dextrose 50% Injectable 12.5 Gram(s) IV Push once  dextrose 50% Injectable 25 Gram(s) IV Push once  furosemide   Injectable 20 milliGRAM(s) IV Push daily  heparin  Injectable 5000 Unit(s) SubCutaneous every 12 hours  insulin glargine Injectable (LANTUS) 50 Unit(s) SubCutaneous at bedtime  insulin lispro (HumaLOG) corrective regimen sliding scale   SubCutaneous three times a day before meals  insulin lispro (HumaLOG) corrective regimen sliding scale   SubCutaneous at bedtime  insulin lispro Injectable (HumaLOG) 16 Unit(s) SubCutaneous three times a day before meals  metoprolol succinate ER 50 milliGRAM(s) Oral daily  nicotine - 21 mG/24Hr(s) Patch 1 patch Transdermal daily  spironolactone 25 milliGRAM(s) Oral daily  tamsulosin 0.4 milliGRAM(s) Oral at bedtime  ticagrelor 90 milliGRAM(s) Oral every 12 hours  tiotropium 18 MICROgram(s) Capsule 1 Capsule(s) Inhalation daily    PRN Inpatient Medications  acetaminophen   Tablet .. 650 milliGRAM(s) Oral every 4 hours PRN  albuterol/ipratropium for Nebulization 3 milliLiter(s) Nebulizer every 6 hours PRN  dextrose 40% Gel 15 Gram(s) Oral once PRN  glucagon  Injectable 1 milliGRAM(s) IntraMuscular once PRN      REVIEW OF SYSTEMS  --------------------------------------------------------------------------------  Gen: No  fevers/chills   Skin: No rashes  Head/Eyes/Ears/Mouth: No headache; Normal hearing;  No sinus pain/discomfort, sore throat  Respiratory:   +  dyspnea, cough, wheezing, hemoptysis  CV: No chest pain, orthopnea  GI: No abdominal pain, diarrhea, nausea, vomiting, melena  : No dysuria, decrease urination or hesitancy urinating  hematuria, nocturia  MSK: No joint pain/swelling; no back pain  Neuro: No dizziness/lightheadedness,  states edema has dissipated     All other systems were reviewed and are negative, except as noted.    VITALS/PHYSICAL EXAM  --------------------------------------------------------------------------------  T(C): 36.5 (12-17-19 @ 20:39), Max: 36.7 (12-17-19 @ 11:20)  HR: 110 (12-17-19 @ 20:39) (101 - 110)  BP: 130/70 (12-17-19 @ 20:39) (117/69 - 130/70)  RR: 18 (12-17-19 @ 20:39) (18 - 18)  SpO2: 98% (12-17-19 @ 20:39) (97% - 98%)  Wt(kg): --  Height (cm): 193 (12-16-19 @ 18:00)  Weight (kg): 121.3 (12-16-19 @ 18:00)  BMI (kg/m2): 32.6 (12-16-19 @ 18:00)  BSA (m2): 2.51 (12-16-19 @ 18:00)      12-16-19 @ 07:01  -  12-17-19 @ 07:00  --------------------------------------------------------  IN: 600 mL / OUT: 300 mL / NET: 300 mL    12-17-19 @ 07:01  -  12-17-19 @ 22:23  --------------------------------------------------------  IN: 1320 mL / OUT: 2250 mL / NET: -930 mL      Physical Exam:  	Gen: Non toxic comfortable appearing   	no jvd  	Pulm: decrease bs  no rales or ronchi or wheezing  	CV: RRR, S1S2; no rub  	Back: No CVA tenderness; no sacral edema  	Abd: +BS, soft, nontender/nondistended  	: No suprapubic tenderness  	UE: Warm, no cyanosis  no clubbing,  no edema  	LE: Warm, no cyanosis  no clubbing, no edema  	Neuro: alert and oriented. speech coherent   	Psych: Normal affect and mood  	Skin: Warm, no decrease skin turgor  + tattoo       LABS/STUDIES  --------------------------------------------------------------------------------              12.3   9.26  >-----------<  200      [12-17-19 @ 08:48]              38.6     134  |  100  |  38  ----------------------------<  237      [12-17-19 @ 05:15]  3.9   |  20  |  1.58        Ca     9.7     [12-17-19 @ 05:15]      Mg     2.0     [12-16-19 @ 06:16]      Phos  3.8     [12-16-19 @ 06:16]    TPro  7.6  /  Alb  4.3  /  TBili  0.4  /  DBili  x   /  AST  19  /  ALT  32  /  AlkPhos  66  [12-16-19 @ 06:16]          Creatinine Trend:  SCr 1.58 [12-17 @ 05:15]  SCr 1.79 [12-16 @ 06:16]  SCr 1.75 [12-15 @ 18:45]    Urinalysis - [02-06-18 @ 04:26]      Color Yellow / Appearance Clear / SG >1.030 / pH 6.5      Gluc >1000 / Ketone Negative  / Bili Negative / Urobili Negative       Blood Negative / Protein 30 / Leuk Est Negative / Nitrite Negative      RBC 0-2 / WBC 0-2 / Hyaline  / Gran  / Sq Epi  / Non Sq Epi  / Bacteria       HbA1c 8.9      [12-16-19 @ 08:15]    HCV 0.09, Nonreact      [12-16-19 @ 08:36]      < from: CT Angio Chest w/ IV Cont (12.15.19 @ 19:55) >    EXAM:  CT ANGIO CHEST (W)AW IC                            PROCEDURE DATE:  12/15/2019            INTERPRETATION:  CLINICAL INFORMATION: Shortness of breath and elevated   d-dimer.  Evaluate for pulmonary embolism.    COMPARISON: CT chest from 11/11/2019..    PROCEDURE:   CT Angiography of the Chest.  90 ml of Omnipaque 350 was injected intravenously. 10 ml were discarded.  Sagittal and coronal reformats were performed as well as 3D (MIP)   reconstructions.    FINDINGS:    LUNGS AND AIRWAYS: Patent central airways.  Mild paraseptal emphysema in   the upper lobes.  Minor dependent atelectasis dependently in both lungs.    Interlobular septal thickening in the lung bases likely reflect   interstitial    PLEURA: Small pleural effusions bilaterally.    MEDIASTINUM AND BOB: No lymphadenopathy.    VESSELS: No pulmonary embolism.  Mild atherosclerotic calcification of   the proximal left subclavian artery.    HEART: Cardiomegaly with predominantly left heart enlargement.  An AICD   lead terminates in the right ventricle.  Multiple coronary artery stents   in place. Trace pericardial fluid.    CHEST WALL AND LOWER NECK: AICD battery in the left chest wall.  Trace   gynecomastia.    VISUALIZED UPPER ABDOMEN: Tiny hiatal hernia.  Diffuse hepatic steatosis.    The upper pole the right kidney is not imaged.    BONES: Within normal limits.    IMPRESSION:   1.  No pulmonary embolism.  2.  No gross CT evidence of pneumonia.  3.  Cardiomegaly with prominent left heart enlargement.  Interstitial  pulmonary edema.  Small pleural effusions bilaterally.  Correlate   clinically for congestive heart failure.  4.  Diffuse hepatic steatosis.                ELLIOTT RODGERS M.D., RADIOLOGY RESIDENT  This document has been electronically signed.  IRIS ZIMMER M.D.,ATTENDING RADIOLOGIST  This document has been electronically signed. Dec 15 2019  9:16PM        < end of copied text >

## 2019-12-17 NOTE — PROGRESS NOTE ADULT - ATTENDING COMMENTS
Will increase Lantus to 50u at bedtime.  Will continue Humalog 16u before each meal as well as coverage scale. Will continue monitoring FS, log, and FU.

## 2019-12-17 NOTE — PROGRESS NOTE ADULT - SUBJECTIVE AND OBJECTIVE BOX
HPI:  Patient seen and examined at bedside on 4 Suresh.  Interval improvement in symptoms with diuresis.  Worsening of LVEF seen on TTE.    Review Of Systems:           Respiratory: No shortness of breath, cough, or wheezing  Cardiovascular: No chest pain or palpitations  10 point review of systems is otherwise negative except as mentioned above        Medications:  acetaminophen   Tablet .. 650 milliGRAM(s) Oral every 4 hours PRN  albuterol/ipratropium for Nebulization 3 milliLiter(s) Nebulizer every 6 hours PRN  aspirin  chewable 81 milliGRAM(s) Oral daily  atorvastatin 40 milliGRAM(s) Oral at bedtime  budesonide 160 MICROgram(s)/formoterol 4.5 MICROgram(s) Inhaler 2 Puff(s) Inhalation two times a day  dextrose 40% Gel 15 Gram(s) Oral once PRN  dextrose 5%. 1000 milliLiter(s) IV Continuous <Continuous>  dextrose 50% Injectable 12.5 Gram(s) IV Push once  dextrose 50% Injectable 25 Gram(s) IV Push once  furosemide   Injectable 20 milliGRAM(s) IV Push daily  glucagon  Injectable 1 milliGRAM(s) IntraMuscular once PRN  heparin  Injectable 5000 Unit(s) SubCutaneous every 12 hours  insulin glargine Injectable (LANTUS) 50 Unit(s) SubCutaneous at bedtime  insulin lispro (HumaLOG) corrective regimen sliding scale   SubCutaneous three times a day before meals  insulin lispro (HumaLOG) corrective regimen sliding scale   SubCutaneous at bedtime  insulin lispro Injectable (HumaLOG) 16 Unit(s) SubCutaneous three times a day before meals  metoprolol succinate ER 50 milliGRAM(s) Oral daily  nicotine - 21 mG/24Hr(s) Patch 1 patch Transdermal daily  spironolactone 25 milliGRAM(s) Oral daily  tamsulosin 0.4 milliGRAM(s) Oral at bedtime  ticagrelor 90 milliGRAM(s) Oral every 12 hours  tiotropium 18 MICROgram(s) Capsule 1 Capsule(s) Inhalation daily    PAST MEDICAL & SURGICAL HISTORY:  AICD (automatic cardioverter/defibrillator) present  Diabetes  Stented coronary artery  No significant past surgical history    Vitals:  T(C): 36.5 (19 @ 20:39), Max: 36.7 (19 @ 11:20)  HR: 110 (19 @ 20:39) (101 - 110)  BP: 130/70 (19 @ 20:39) (117/69 - 130/70)  BP(mean): --  RR: 18 (19 @ 20:39) (18 - 18)  SpO2: 98% (19 @ 20:39) (97% - 98%)  Wt(kg): --  Daily     Daily Weight in k.1 (17 Dec 2019 06:51)  I&O's Summary    16 Dec 2019 07:  -  17 Dec 2019 07:00  --------------------------------------------------------  IN: 600 mL / OUT: 300 mL / NET: 300 mL    17 Dec 2019 07:01  -  17 Dec 2019 22:26  --------------------------------------------------------  IN: 1320 mL / OUT: 2250 mL / NET: -930 mL        Physical Exam:  Appearance: Normal, well groomed, NAD  Eyes: PERRLA, EOMI, pink conjunctiva, no scleral icterus   HENT: Normal oral mucosa  Cardiovascular: RRR, S1, S2, no murmur, rub, or gallop; +edema; +JVD  Respiratory: diminished breath sounds throughout  Gastrointestinal: Soft, non-tender, non-distended, BS+  Musculoskeletal: No clubbing or joint deformity   Neurologic: No focal weakness  Lymphatic: No lymphadenopathy  Psychiatry: AAOx3 with appropriate mood and affect  Skin: No rashes, ecchymoses, or cyanosis; +tattoos                           12.3   9.26  )-----------( 200      ( 17 Dec 2019 08:48 )             38.6         134<L>  |  100  |  38<H>  ----------------------------<  237<H>  3.9   |  20<L>  |  1.58<H>    Ca    9.7      17 Dec 2019 05:15  Phos  3.8     12-16  Mg     2.0     12-16    TPro  7.6  /  Alb  4.3  /  TBili  0.4  /  DBili  x   /  AST  19  /  ALT  32  /  AlkPhos  66  12-16          Serum Pro-Brain Natriuretic Peptide: 432 pg/mL (12-15 @ 18:45)        Cardiovascular Diagnostic Testing:  ECG: sinus tach, 100 bpm, normal axis, poor R-wave progression    Echo: < from: TTE with Doppler (w/Cont) (18 @ 06:06) >  ------------------------------------------------------------------------  Dimensions:    Normal Values:  LA:     4.0    2.0 - 4.0 cm  Ao:     3.5    2.0 - 3.8 cm  SEPTUM: 1.0    0.6 - 1.2 cm  PWT:    1.2    0.6 - 1.1 cm  LVIDd:  6.5    3.0 - 5.6 cm  LVIDs:  5.3    1.8 - 4.0 cm  Derived variables:  LVMI: 132 g/m2  RWT: 0.36  EF (Visual Estimate): 20-25 %  ------------------------------------------------------------------------  Observations:  Mitral Valve: Grossly normal mitral valve. Minimal mitral  regurgitation.  Aortic Valve/Aorta: Trileaflet aortic valve. Mean  transaortic valve gradient equals 2 mm Hg, aortic valve  velocity time integral equals 20 cm. No aortic valve  regurgitation seen. Mean gradient is equal to 1 mm Hg, LVOT  velocity time integral equals 11 cm.  Normal aortic root size. (Ao: 3.5 cm at the sinuses of  Valsalva).  Left Atrium: Left atrium not well visualized.  Left Ventricle: Endocardial visualization enhanced with  intravenous injection ofecho contrast (Definity).  Severe  segmental left ventricular systolic dysfunction. The  anteroseptal, inferoseptal, anterior walls and all apical  segments are akinetic.  No left ventricular thrombus.  Swirling of contrast suggests low flow.  Moderate left  ventricular enlargement. Mild diastolic dysfunction (Stage  I).  Right Heart: Normal right atrium. The right ventricle is  not well visualized; grossly normal right ventricular  systolic function. TAPSE > 2.0 cm (normal).  Tricuspid  valve not well visualized. Pulmonic valve not well  visualized, probably normal.  Pericardium/Pleura: Trace pericardial effusion.  Hemodynamic: Estimated right atrial pressure is 8 mm Hg.  Inadequate tricuspid regurgitation Doppler envelope  precludes estimation of RVSP.  ------------------------------------------------------------------------  Conclusions:  1. Endocardial visualization enhanced with intravenous  injection of echo contrast (Definity).  Severe segmental  left ventricular systolic dysfunction. The anteroseptal,  inferoseptal, anterior walls and all apical segments are  akinetic.  No left ventricular thrombus. Swirling of  contrast suggests low flow.  2. Mild diastolic dysfunction (Stage I).  3. Trace pericardial effusion.  *** No previous Echo exam. Technically difficult (portable)  study.  ------------------------------------------------------------------------  Revised on 2018 - 9:14 AM by Major Remy M.D.  ------------------------------------------------------------------------    < end of copied text >    Cath: < from: Cardiac Cath Lab - Adult (18 @ 16:05) >  VENTRICLES: Analysis of regional contractile function demonstrated severe  anterolateral hypokinesis, apical dyskinesis, and diaphragmatic  dyskinesis. EF estimated was 25 %.  CORONARY VESSELS: The coronary circulation is right dominant.  LM:   --  LM: Angiography showed minor luminal irregularities with no flow  limiting lesions.  LAD:   --  Ostial LAD: There was a 100 % stenosis.  CX:   --  Mid circumflex: There was a 30 % stenosis.  RI:   --  Ramus intermedius: There was a 100 % stenosis.  RCA:   --  RCA: Angiography showed minor luminal irregularities with no  flow limiting lesions.  COMPLICATIONS: There were no complications. No complications occurred  during the cath lab visit.  DIAGNOSTIC RECOMMENDATIONS: ASA and Plavix for 1 year.  INTERVENTIONAL RECOMMENDATIONS: ASA and Plavix for 1 year.  Prepared and signed by  Stefan Mireles M.D.  Signed 2018 17:53:51    < end of copied text >    Interpretation of Telemetry: sinus tach, AIVR seen

## 2019-12-17 NOTE — PROGRESS NOTE ADULT - SUBJECTIVE AND OBJECTIVE BOX
SEVERIANO MARTINEZ  59y Male  MRN:[id number]    Patient is a 59y old  Male who presents with a chief complaint of SOB x 2 weeks (15 Dec 2019 22:34)    HPI:  Patient is a 60 y/o male w/pmh T2DM, HTN, CAD/MI s/p stents (most recent 2/2018), HFrEF, extensive smoking history quit about two weeks ago, newly diagnosed COPD and currently on trelegy, presents for worsened dyspnea on exertion over the past two weeks.   Patient reports , shortness of breath after a few steps, he was previously able to walk about a block . He was evaluated by pulmonary and diagnosed with mild COPD and started on Trelegy , however symptoms continued to worsen. He reports mild lower extremity edema . He reports increased orthopnea , but no PND episodes. He reports a non productive cough , no wheezing. He has no fever or chills , no flu-like symptoms. on day of admission he had one episode of chest pain  which occurred while laying down after coughing, pressure like, located in the substernal region , centrally located , lasted a few seconds and self resolved. (15 Dec 2019 22:34)      Patient seen and evaluated at bedside. No acute events overnight except as noted.    Interval HPI: reports to be feeling well    PAST MEDICAL & SURGICAL HISTORY:  AICD (automatic cardioverter/defibrillator) present  Diabetes  Stented coronary artery  No significant past surgical history      REVIEW OF SYSTEMS:  as per hpi    VITALS:  Vital Signs Last 24 Hrs  T(C): 36.7 (17 Dec 2019 11:20), Max: 36.7 (17 Dec 2019 11:20)  T(F): 98 (17 Dec 2019 11:20), Max: 98 (17 Dec 2019 11:20)  HR: 102 (17 Dec 2019 11:20) (89 - 102)  BP: 127/80 (17 Dec 2019 11:20) (117/69 - 127/80)  BP(mean): --  RR: 18 (17 Dec 2019 11:20) (18 - 19)  SpO2: 97% (17 Dec 2019 11:20) (97% - 98%)    PHYSICAL EXAM:  GENERAL: NAD, well-developed  HEAD:  Atraumatic, Normocephalic  EYES: EOMI, PERRLA, conjunctiva and sclera clear  NECK: Supple, No JVD  CHEST/LUNG: Clear to auscultation bilaterally; No wheeze  HEART: S1, S2; No murmurs, rubs, or gallops  ABDOMEN: Soft, Nontender, Nondistended; Bowel sounds present  EXTREMITIES:  2+ Peripheral Pulses, No clubbing, cyanosis, or edema  PSYCH: Normal affect  NEUROLOGY: AAOX3; non-focal  SKIN: No rashes or lesions    Consultant(s) Notes Reviewed:  [x ] YES  [ ] NO  Care Discussed with Consultants/Other Providers [ x] YES  [ ] NO    MEDS:   MEDICATIONS  (STANDING):  aspirin  chewable 81 milliGRAM(s) Oral daily  atorvastatin 40 milliGRAM(s) Oral at bedtime  budesonide 160 MICROgram(s)/formoterol 4.5 MICROgram(s) Inhaler 2 Puff(s) Inhalation two times a day  dextrose 5%. 1000 milliLiter(s) (50 mL/Hr) IV Continuous <Continuous>  dextrose 50% Injectable 12.5 Gram(s) IV Push once  dextrose 50% Injectable 25 Gram(s) IV Push once  furosemide   Injectable 20 milliGRAM(s) IV Push daily  heparin  Injectable 5000 Unit(s) SubCutaneous every 12 hours  insulin glargine Injectable (LANTUS) 45 Unit(s) SubCutaneous at bedtime  insulin lispro (HumaLOG) corrective regimen sliding scale   SubCutaneous three times a day before meals  insulin lispro (HumaLOG) corrective regimen sliding scale   SubCutaneous at bedtime  insulin lispro Injectable (HumaLOG) 16 Unit(s) SubCutaneous three times a day before meals  metoprolol succinate ER 50 milliGRAM(s) Oral daily  nicotine - 21 mG/24Hr(s) Patch 1 patch Transdermal daily  spironolactone 25 milliGRAM(s) Oral daily  tamsulosin 0.4 milliGRAM(s) Oral at bedtime  ticagrelor 90 milliGRAM(s) Oral every 12 hours  tiotropium 18 MICROgram(s) Capsule 1 Capsule(s) Inhalation daily    MEDICATIONS  (PRN):  acetaminophen   Tablet .. 650 milliGRAM(s) Oral every 4 hours PRN Mild Pain (1 - 3)  albuterol/ipratropium for Nebulization 3 milliLiter(s) Nebulizer every 6 hours PRN Shortness of Breath and/or Wheezing  dextrose 40% Gel 15 Gram(s) Oral once PRN Blood Glucose LESS THAN 70 milliGRAM(s)/deciliter  glucagon  Injectable 1 milliGRAM(s) IntraMuscular once PRN Glucose LESS THAN 70 milligrams/deciliter      ALLERGIES:  No Known Allergies      LABS:                                    12.3   9.26  )-----------( 200      ( 17 Dec 2019 08:48 )             38.6   12-17    134<L>  |  100  |  38<H>  ----------------------------<  237<H>  3.9   |  20<L>  |  1.58<H>    Ca    9.7      17 Dec 2019 05:15  Phos  3.8     12-16  Mg     2.0     12-16    TPro  7.6  /  Alb  4.3  /  TBili  0.4  /  DBili  x   /  AST  19  /  ALT  32  /  AlkPhos  66  12-16      < from: TTE with Doppler (w/Cont) (12.17.19 @ 06:40) >  --------------------------------------  Conclusions:  1. Tethered mitral valve leaflets with normal opening.  Mild-moderate mitral regurgitation.  2. Calcified trileaflet aortic valve with normal opening.  No aortic valve regurgitation seen.  3. Eccentric left ventricular hypertrophy (dilated left  ventricle with normal relative wall thickness).  4. Endocardial visualization enhanced with intravenous  injection of Ultrasonic Enhancing Agent (Definity). Severe  global left ventricular systolic dysfunction. No left  ventricular thrombus.  5. The right ventricle is not well visualized; grossly  normal right ventricular systolic function. A device wire  is noted in the right heart.  6. Trace pericardial effusion.  *** Compared with echocardiogram of 2/8/2018, results are  similar on today's study.  ---------------------------------------------    < end of copied text >

## 2019-12-18 LAB
ANION GAP SERPL CALC-SCNC: 15 MMOL/L — SIGNIFICANT CHANGE UP (ref 5–17)
BUN SERPL-MCNC: 38 MG/DL — HIGH (ref 7–23)
CALCIUM SERPL-MCNC: 9.1 MG/DL — SIGNIFICANT CHANGE UP (ref 8.4–10.5)
CHLORIDE SERPL-SCNC: 99 MMOL/L — SIGNIFICANT CHANGE UP (ref 96–108)
CO2 SERPL-SCNC: 20 MMOL/L — LOW (ref 22–31)
CREAT SERPL-MCNC: 1.5 MG/DL — HIGH (ref 0.5–1.3)
GLUCOSE BLDC GLUCOMTR-MCNC: 169 MG/DL — HIGH (ref 70–99)
GLUCOSE BLDC GLUCOMTR-MCNC: 178 MG/DL — HIGH (ref 70–99)
GLUCOSE BLDC GLUCOMTR-MCNC: 194 MG/DL — HIGH (ref 70–99)
GLUCOSE BLDC GLUCOMTR-MCNC: 206 MG/DL — HIGH (ref 70–99)
GLUCOSE BLDC GLUCOMTR-MCNC: 243 MG/DL — HIGH (ref 70–99)
GLUCOSE SERPL-MCNC: 224 MG/DL — HIGH (ref 70–99)
HCT VFR BLD CALC: 37.1 % — LOW (ref 39–50)
HGB BLD-MCNC: 11.9 G/DL — LOW (ref 13–17)
MAGNESIUM SERPL-MCNC: 2.3 MG/DL — SIGNIFICANT CHANGE UP (ref 1.6–2.6)
MCHC RBC-ENTMCNC: 25.8 PG — LOW (ref 27–34)
MCHC RBC-ENTMCNC: 32.1 GM/DL — SIGNIFICANT CHANGE UP (ref 32–36)
MCV RBC AUTO: 80.5 FL — SIGNIFICANT CHANGE UP (ref 80–100)
NRBC # BLD: 0 /100 WBCS — SIGNIFICANT CHANGE UP (ref 0–0)
PHOSPHATE SERPL-MCNC: 3.8 MG/DL — SIGNIFICANT CHANGE UP (ref 2.5–4.5)
PLATELET # BLD AUTO: 195 K/UL — SIGNIFICANT CHANGE UP (ref 150–400)
POTASSIUM SERPL-MCNC: 3.9 MMOL/L — SIGNIFICANT CHANGE UP (ref 3.5–5.3)
POTASSIUM SERPL-SCNC: 3.9 MMOL/L — SIGNIFICANT CHANGE UP (ref 3.5–5.3)
RBC # BLD: 4.61 M/UL — SIGNIFICANT CHANGE UP (ref 4.2–5.8)
RBC # FLD: 15.4 % — HIGH (ref 10.3–14.5)
SODIUM SERPL-SCNC: 134 MMOL/L — LOW (ref 135–145)
WBC # BLD: 6.59 K/UL — SIGNIFICANT CHANGE UP (ref 3.8–10.5)
WBC # FLD AUTO: 6.59 K/UL — SIGNIFICANT CHANGE UP (ref 3.8–10.5)

## 2019-12-18 PROCEDURE — 99152 MOD SED SAME PHYS/QHP 5/>YRS: CPT

## 2019-12-18 PROCEDURE — 93454 CORONARY ARTERY ANGIO S&I: CPT | Mod: 26,59

## 2019-12-18 PROCEDURE — 99233 SBSQ HOSP IP/OBS HIGH 50: CPT

## 2019-12-18 PROCEDURE — 99223 1ST HOSP IP/OBS HIGH 75: CPT

## 2019-12-18 PROCEDURE — 93010 ELECTROCARDIOGRAM REPORT: CPT

## 2019-12-18 PROCEDURE — 92928 PRQ TCAT PLMT NTRAC ST 1 LES: CPT | Mod: LC

## 2019-12-18 RX ORDER — INSULIN GLARGINE 100 [IU]/ML
55 INJECTION, SOLUTION SUBCUTANEOUS AT BEDTIME
Refills: 0 | Status: DISCONTINUED | OUTPATIENT
Start: 2019-12-18 | End: 2019-12-19

## 2019-12-18 RX ORDER — SODIUM CHLORIDE 9 MG/ML
1000 INJECTION INTRAMUSCULAR; INTRAVENOUS; SUBCUTANEOUS
Refills: 0 | Status: DISCONTINUED | OUTPATIENT
Start: 2019-12-18 | End: 2019-12-19

## 2019-12-18 RX ORDER — SACUBITRIL AND VALSARTAN 24; 26 MG/1; MG/1
1 TABLET, FILM COATED ORAL
Qty: 60 | Refills: 0
Start: 2019-12-18 | End: 2020-01-16

## 2019-12-18 RX ORDER — HEPARIN SODIUM 5000 [USP'U]/ML
5000 INJECTION INTRAVENOUS; SUBCUTANEOUS EVERY 12 HOURS
Refills: 0 | Status: DISCONTINUED | OUTPATIENT
Start: 2019-12-19 | End: 2019-12-20

## 2019-12-18 RX ORDER — SACUBITRIL AND VALSARTAN 24; 26 MG/1; MG/1
1 TABLET, FILM COATED ORAL
Refills: 0 | Status: DISCONTINUED | OUTPATIENT
Start: 2019-12-18 | End: 2019-12-20

## 2019-12-18 RX ORDER — INSULIN LISPRO 100/ML
18 VIAL (ML) SUBCUTANEOUS
Refills: 0 | Status: DISCONTINUED | OUTPATIENT
Start: 2019-12-18 | End: 2019-12-19

## 2019-12-18 RX ADMIN — SPIRONOLACTONE 25 MILLIGRAM(S): 25 TABLET, FILM COATED ORAL at 05:58

## 2019-12-18 RX ADMIN — Medication 18 UNIT(S): at 18:46

## 2019-12-18 RX ADMIN — Medication 40 MILLIGRAM(S): at 23:36

## 2019-12-18 RX ADMIN — Medication 1 PATCH: at 06:05

## 2019-12-18 RX ADMIN — TICAGRELOR 90 MILLIGRAM(S): 90 TABLET ORAL at 05:58

## 2019-12-18 RX ADMIN — Medication 2: at 08:05

## 2019-12-18 RX ADMIN — Medication 3 MILLILITER(S): at 07:12

## 2019-12-18 RX ADMIN — Medication 3 MILLILITER(S): at 21:28

## 2019-12-18 RX ADMIN — Medication 18 UNIT(S): at 12:17

## 2019-12-18 RX ADMIN — TAMSULOSIN HYDROCHLORIDE 0.4 MILLIGRAM(S): 0.4 CAPSULE ORAL at 21:29

## 2019-12-18 RX ADMIN — Medication 1 PATCH: at 12:17

## 2019-12-18 RX ADMIN — Medication 1: at 18:45

## 2019-12-18 RX ADMIN — TICAGRELOR 90 MILLIGRAM(S): 90 TABLET ORAL at 18:08

## 2019-12-18 RX ADMIN — INSULIN GLARGINE 55 UNIT(S): 100 INJECTION, SOLUTION SUBCUTANEOUS at 21:30

## 2019-12-18 RX ADMIN — Medication 20 MILLIGRAM(S): at 05:57

## 2019-12-18 RX ADMIN — Medication 650 MILLIGRAM(S): at 23:50

## 2019-12-18 RX ADMIN — Medication 81 MILLIGRAM(S): at 12:17

## 2019-12-18 RX ADMIN — SODIUM CHLORIDE 70 MILLILITER(S): 9 INJECTION INTRAMUSCULAR; INTRAVENOUS; SUBCUTANEOUS at 13:35

## 2019-12-18 RX ADMIN — Medication 16 UNIT(S): at 08:05

## 2019-12-18 RX ADMIN — Medication 50 MILLIGRAM(S): at 05:58

## 2019-12-18 RX ADMIN — SACUBITRIL AND VALSARTAN 1 TABLET(S): 24; 26 TABLET, FILM COATED ORAL at 18:47

## 2019-12-18 RX ADMIN — ATORVASTATIN CALCIUM 40 MILLIGRAM(S): 80 TABLET, FILM COATED ORAL at 21:29

## 2019-12-18 RX ADMIN — Medication 2: at 12:16

## 2019-12-18 NOTE — PROGRESS NOTE ADULT - ASSESSMENT
58 y/o male w/pmh T2DM, HTN, CAD/MI, ischemic cardiomyopathy,  s/p stents (most recent 2/2018), HFrEF, extensive smoking history quit about two weeks ago, newly diagnosed COPD and currently on trelegy, presented for worsened dyspnea on exertion over the past two weeks.  on day of admission he had one episode of chest pain  which occurred while laying down after coughing, pressure like, located in the substernal region , centrally located , lasted a few seconds and self resolved.  dx with chf. started on lasix. on admission cr also noted to be elevated.     1- CKD III   2- ischemic cardiomyopathy   3- HTN   4- BPH      creatinine likely  baseline  range now  to have urine protein/cr ratio   lasix 20 mg ivp and aldactone 25 mg qd but hold for coronary angio  i d/w pt risks of worsening renal function with coronary angio.   chantel-angiogram ivf hydration   hold entresto post angio until cr noted to be stable   suspect ckd III in setting of ICM   cont flomax 0.4 mg qd

## 2019-12-18 NOTE — PROGRESS NOTE ADULT - ASSESSMENT
59 year-old gentleman with cardiovascular history as above including ischemic cardiomyopathy now presents with acute on chronic systolic heart failure in the setting of recent dietary indiscretions.  Interrogation of [facility] ICD to evaluate for burden of VT showed no sustained episodes.    Continue DAPT, high intensity statin, and beta-blocker at home dose.  Hold ACEi - plan to transition to Entresto if tolerated and covered by insurance.    Diuresis as tolerated.  Keep O > I, K > 4.0, Mag > 2.0  Check daily standing weights.    Recommend counselling with nutritionist regarding low salt diet.    Plan for left heart cath today to assess coronary arteries in patient with new decline in LVEF.

## 2019-12-18 NOTE — PROGRESS NOTE ADULT - SUBJECTIVE AND OBJECTIVE BOX
SEVERIANO MARTINEZ  59y Male  MRN:[id number]    Patient is a 59y old  Male who presents with a chief complaint of SOB x 2 weeks (15 Dec 2019 22:34)    HPI:  Patient is a 58 y/o male w/pmh T2DM, HTN, CAD/MI s/p stents (most recent 2/2018), HFrEF, extensive smoking history quit about two weeks ago, newly diagnosed COPD and currently on trelegy, presents for worsened dyspnea on exertion over the past two weeks.   Patient reports , shortness of breath after a few steps, he was previously able to walk about a block . He was evaluated by pulmonary and diagnosed with mild COPD and started on Trelegy , however symptoms continued to worsen. He reports mild lower extremity edema . He reports increased orthopnea , but no PND episodes. He reports a non productive cough , no wheezing. He has no fever or chills , no flu-like symptoms. on day of admission he had one episode of chest pain  which occurred while laying down after coughing, pressure like, located in the substernal region , centrally located , lasted a few seconds and self resolved. (15 Dec 2019 22:34)      Patient seen and evaluated at bedside. No acute events overnight except as noted.    Interval HPI: no events o/n     PAST MEDICAL & SURGICAL HISTORY:  AICD (automatic cardioverter/defibrillator) present  Diabetes  Stented coronary artery  No significant past surgical history      REVIEW OF SYSTEMS:  as per hpi    VITALS:  Vital Signs Last 24 Hrs  T(C): 36.5 (18 Dec 2019 11:14), Max: 36.8 (18 Dec 2019 03:57)  T(F): 97.7 (18 Dec 2019 11:14), Max: 98.2 (18 Dec 2019 03:57)  HR: 104 (18 Dec 2019 11:14) (99 - 110)  BP: 129/83 (18 Dec 2019 11:14) (121/86 - 130/70)  BP(mean): --  RR: 17 (18 Dec 2019 11:14) (17 - 18)  SpO2: 99% (18 Dec 2019 11:14) (94% - 99%)      PHYSICAL EXAM:  GENERAL: NAD, well-developed  HEAD:  Atraumatic, Normocephalic  EYES: EOMI, PERRLA, conjunctiva and sclera clear  NECK: Supple, No JVD  CHEST/LUNG: Clear to auscultation bilaterally; No wheeze  HEART: S1, S2; No murmurs, rubs, or gallops  ABDOMEN: Soft, Nontender, Nondistended; Bowel sounds present  EXTREMITIES:  2+ Peripheral Pulses, No clubbing, cyanosis, or edema  PSYCH: Normal affect  NEUROLOGY: AAOX3; non-focal  SKIN: No rashes or lesions    Consultant(s) Notes Reviewed:  [x ] YES  [ ] NO  Care Discussed with Consultants/Other Providers [ x] YES  [ ] NO    MEDS:  MEDICATIONS  (STANDING):  aspirin  chewable 81 milliGRAM(s) Oral daily  atorvastatin 40 milliGRAM(s) Oral at bedtime  budesonide 160 MICROgram(s)/formoterol 4.5 MICROgram(s) Inhaler 2 Puff(s) Inhalation two times a day  dextrose 5%. 1000 milliLiter(s) (50 mL/Hr) IV Continuous <Continuous>  dextrose 50% Injectable 12.5 Gram(s) IV Push once  dextrose 50% Injectable 25 Gram(s) IV Push once  furosemide   Injectable 20 milliGRAM(s) IV Push daily  heparin  Injectable 5000 Unit(s) SubCutaneous every 12 hours  insulin glargine Injectable (LANTUS) 55 Unit(s) SubCutaneous at bedtime  insulin lispro (HumaLOG) corrective regimen sliding scale   SubCutaneous three times a day before meals  insulin lispro (HumaLOG) corrective regimen sliding scale   SubCutaneous at bedtime  insulin lispro Injectable (HumaLOG) 18 Unit(s) SubCutaneous three times a day before meals  metoprolol succinate ER 50 milliGRAM(s) Oral daily  nicotine - 21 mG/24Hr(s) Patch 1 patch Transdermal daily  sacubitril 24 mG/valsartan 26 mG 1 Tablet(s) Oral two times a day  sodium chloride 0.9%. 1000 milliLiter(s) (70 mL/Hr) IV Continuous <Continuous>  spironolactone 25 milliGRAM(s) Oral daily  tamsulosin 0.4 milliGRAM(s) Oral at bedtime  ticagrelor 90 milliGRAM(s) Oral every 12 hours  tiotropium 18 MICROgram(s) Capsule 1 Capsule(s) Inhalation daily    MEDICATIONS  (PRN):  acetaminophen   Tablet .. 650 milliGRAM(s) Oral every 4 hours PRN Mild Pain (1 - 3)  albuterol/ipratropium for Nebulization 3 milliLiter(s) Nebulizer every 6 hours PRN Shortness of Breath and/or Wheezing  dextrose 40% Gel 15 Gram(s) Oral once PRN Blood Glucose LESS THAN 70 milliGRAM(s)/deciliter  glucagon  Injectable 1 milliGRAM(s) IntraMuscular once PRN Glucose LESS THAN 70 milligrams/deciliter      ALLERGIES:  No Known Allergies      LABS:                                              11.9   6.59  )-----------( 195      ( 18 Dec 2019 04:19 )             37.1   12-18    134<L>  |  99  |  38<H>  ----------------------------<  224<H>  3.9   |  20<L>  |  1.50<H>    Ca    9.1      18 Dec 2019 04:19  Phos  3.8     12-18  Mg     2.3     12-18          < from: TTE with Doppler (w/Cont) (12.17.19 @ 06:40) >  --------------------------------------  Conclusions:  1. Tethered mitral valve leaflets with normal opening.  Mild-moderate mitral regurgitation.  2. Calcified trileaflet aortic valve with normal opening.  No aortic valve regurgitation seen.  3. Eccentric left ventricular hypertrophy (dilated left  ventricle with normal relative wall thickness).  4. Endocardial visualization enhanced with intravenous  injection of Ultrasonic Enhancing Agent (Definity). Severe  global left ventricular systolic dysfunction. No left  ventricular thrombus.  5. The right ventricle is not well visualized; grossly  normal right ventricular systolic function. A device wire  is noted in the right heart.  6. Trace pericardial effusion.  *** Compared with echocardiogram of 2/8/2018, results are  similar on today's study.  ---------------------------------------------    < end of copied text >

## 2019-12-18 NOTE — PROGRESS NOTE ADULT - SUBJECTIVE AND OBJECTIVE BOX
PULMONARY PROGRESS NOTE    SEVERIANO MARTINEZ  MRN-[id number]    Patient is a 59y old  Male who presents with a chief complaint of SOB x 2 weeks (18 Dec 2019 11:00)      HPI:  -Patient well known to , LUIS ALFREDO and COPD.  On trelegy daily at home.  Reports still feeling a little short of breath but better than on admission.    ROS:   -no chest pain    ACTIVE MEDICATION LIST:  MEDICATIONS  (STANDING):  aspirin  chewable 81 milliGRAM(s) Oral daily  atorvastatin 40 milliGRAM(s) Oral at bedtime  budesonide 160 MICROgram(s)/formoterol 4.5 MICROgram(s) Inhaler 2 Puff(s) Inhalation two times a day  dextrose 5%. 1000 milliLiter(s) (50 mL/Hr) IV Continuous <Continuous>  dextrose 50% Injectable 12.5 Gram(s) IV Push once  dextrose 50% Injectable 25 Gram(s) IV Push once  furosemide   Injectable 20 milliGRAM(s) IV Push daily  heparin  Injectable 5000 Unit(s) SubCutaneous every 12 hours  insulin glargine Injectable (LANTUS) 55 Unit(s) SubCutaneous at bedtime  insulin lispro (HumaLOG) corrective regimen sliding scale   SubCutaneous three times a day before meals  insulin lispro (HumaLOG) corrective regimen sliding scale   SubCutaneous at bedtime  insulin lispro Injectable (HumaLOG) 18 Unit(s) SubCutaneous three times a day before meals  metoprolol succinate ER 50 milliGRAM(s) Oral daily  nicotine - 21 mG/24Hr(s) Patch 1 patch Transdermal daily  sacubitril 24 mG/valsartan 26 mG 1 Tablet(s) Oral two times a day  spironolactone 25 milliGRAM(s) Oral daily  tamsulosin 0.4 milliGRAM(s) Oral at bedtime  ticagrelor 90 milliGRAM(s) Oral every 12 hours  tiotropium 18 MICROgram(s) Capsule 1 Capsule(s) Inhalation daily    MEDICATIONS  (PRN):  acetaminophen   Tablet .. 650 milliGRAM(s) Oral every 4 hours PRN Mild Pain (1 - 3)  albuterol/ipratropium for Nebulization 3 milliLiter(s) Nebulizer every 6 hours PRN Shortness of Breath and/or Wheezing  dextrose 40% Gel 15 Gram(s) Oral once PRN Blood Glucose LESS THAN 70 milliGRAM(s)/deciliter  glucagon  Injectable 1 milliGRAM(s) IntraMuscular once PRN Glucose LESS THAN 70 milligrams/deciliter      EXAM:  Vital Signs Last 24 Hrs  T(C): 36.5 (18 Dec 2019 11:14), Max: 36.8 (18 Dec 2019 03:57)  T(F): 97.7 (18 Dec 2019 11:14), Max: 98.2 (18 Dec 2019 03:57)  HR: 104 (18 Dec 2019 11:14) (99 - 110)  BP: 129/83 (18 Dec 2019 11:14) (121/86 - 130/70)  BP(mean): --  RR: 17 (18 Dec 2019 11:14) (17 - 18)  SpO2: 99% (18 Dec 2019 11:14) (94% - 99%)    GENERAL: The patient is awake and alert in no apparent distress.     LUNGS: Clear to auscultation without wheezing, rales or rhonchi; respirations unlabored    HEART: S1/S2    LABS/IMAGING: reviewed                        11.9   6.59  )-----------( 195      ( 18 Dec 2019 04:19 )             37.1   12-18    134<L>  |  99  |  38<H>  ----------------------------<  224<H>  3.9   |  20<L>  |  1.50<H>    Ca    9.1      18 Dec 2019 04:19  Phos  3.8     12-18  Mg     2.3     12-18    < from: CT Angio Chest w/ IV Cont (12.15.19 @ 19:55) >  IMPRESSION:   1.  No pulmonary embolism.  2.  No gross CT evidence of pneumonia.  3.  Cardiomegaly with prominent left heart enlargement.  Interstitial  pulmonary edema.  Small pleural effusions bilaterally.  Correlate   clinically for congestive heart failure.  4.  Diffuse hepatic steatosis.    < end of copied text >      PROBLEM LIST:  59y Male with HEALTH ISSUES - PROBLEM Dx:  Obesity (BMI 30.0-34.9): Obesity (BMI 30.0-34.9)  MANJIT (acute kidney injury): MANJIT (acute kidney injury)  ACP (advance care planning): ACP (advance care planning)  Need for prophylactic measure: Need for prophylactic measure  BPH (benign prostatic hyperplasia): BPH (benign prostatic hyperplasia)  Diabetes: Diabetes  COPD (chronic obstructive pulmonary disease): COPD (chronic obstructive pulmonary disease)  CAD (coronary artery disease): CAD (coronary artery disease)  Acute on chronic heart failure with reduced ejection fraction and diastolic dysfunction: Acute on chronic heart failure with reduced ejection fraction and diastolic dysfunction      RECS:  -Cont symbicort/spiriva/duonebs.  If family can bring in trelegy can change to just this daily  -start CPAP [id number] [id number] for luis alfredo  -appreciate cardiac [facility]      Shakira Lopez MD   [phone]

## 2019-12-18 NOTE — PROGRESS NOTE ADULT - ASSESSMENT
Assessment  DMT2: 59y Male with DM T2 with hyperglycemia, A1C 8.9% , was on insulin at home (Lantus 50u at bedtime, Humalog 20u before meals), now on basal bolus insulin, increased dose yesterday,  blood sugars fluctuating, still elevated and not at target, no hypoglycemic episode. Patient is eating full meals in addition to meals brought from home, has good appetite, non compliant with low carb diet, appears comfortable.  HF: on medications, no chest pain, stable, monitored.  COPD: On management, stable, FU Pulm.  MANJIT: Monitor labs/BMP  Obesity: No strict exercise routines, not on any weight loss program, neither on low calorie diet.          Cortney Flanagan MD  Cell: [phone]  Office: [phone] Assessment  DMT2: 59y Male with DM T2 with hyperglycemia, A1C 8.9% , was on insulin at home (Lantus 50u at bedtime, Humalog 20u before meals), now on basal bolus insulin, increased dose yesterday,  blood sugars fluctuating, still elevated and not at target,  no hypoglycemic episode. Patient is eating full meals in addition to meals brought from home, has good appetite, non compliant with low carb diet, appears comfortable.  HF: on medications, no chest pain, stable, monitored.  COPD: On management, stable, FU Pulm.  MANJIT: Monitor labs/BMP  Obesity: No strict exercise routines, not on any weight loss program, neither on low calorie diet.          Cortney Flanagan MD  Cell: [phone]  Office: [phone]

## 2019-12-18 NOTE — PROGRESS NOTE ADULT - ATTENDING COMMENTS
Will increase Lantus to 55u at bedtime, increase Humalog to 18u before each meal, and continue coverage scale. Will continue monitoring FS and FU.

## 2019-12-18 NOTE — CHART NOTE - NSCHARTNOTEFT_GEN_A_CORE
CC: SOB      HPI:  Called by RN that patient had SOB after walking around the floor and trouble breathing while laying down.  Patient had a LHC completed today.  Patient was seen and examined by me at bedside.  Patient endorses similar chest discomfort that he had after the LHC.  Patient denies headache, dizziness, palpitations abdominal  pain, N/V/D, numbness/tingling, extremity weakness, dysuria.         Vital Signs Last 24 Hrs  T(C): 36.8 (18 Dec 2019 21:24), Max: 37.1 (18 Dec 2019 20:15)  T(F): 98.2 (18 Dec 2019 21:24), Max: 98.8 (18 Dec 2019 20:15)  HR: 93 (18 Dec 2019 23:26) (93 - 106)  BP: 121/77 (18 Dec 2019 23:26) (101/67 - 135/79)  BP(mean): --  RR: 18 (18 Dec 2019 23:26) (17 - 18)  SpO2: 96% (18 Dec 2019 23:26) (93% - 99%)      Physical Exam:  General: WN/WD NAD, AOx3, nontoxic appearing  Head:  NC/AT  CV: RRR, S1S2   Respiratory: Slight wheezes in RLL.  Non-labored breathing  Abdominal: (+) bowel sounds x4. Soft, NT, ND, no palpable mass, no guarding, or rebound tenderness  Genitourinary: (-) Peguero   MSK: No BLLE edema, + peripheral pulses, FROM all 4 extremity  Skin: (+) warm, dry   Psych: Appropriate affect       Labs:                        11.9   6.59  )-----------( 195      ( 18 Dec 2019 04:19 )             37.1     12-18    134<L>  |  99  |  38<H>  ----------------------------<  224<H>  3.9   |  20<L>  |  1.50<H>    Ca    9.1      18 Dec 2019 04:19  Phos  3.8     12-18  Mg     2.3     12-18            Radiology:          Assessment & Plan:  HPI:  Patient is a 60 y/o male w/pmh T2DM, HTN, CAD/MI s/p stents (most recent 2/2018), HFrEF, extensive smoking history quit about two weeks ago, newly diagnosed COPD and currently on trelegy, presents for worsened dyspnea on exertion over the past two weeks.         1. SOB  -Patient given 40mg IV Solumedrol x 1 dose  -Patient recently had Duoneb treatment this evening.  Plan to give another treatment early if SOB persists  -Patient is currently saturating at 96% on room air, will consider NC for symptomatic relief if SOB persists  -Will continue with Entresto and Lasix for HF  -Will re-assess patient's work of breathing and reassess  -Will continue to closely monitor patient/vitals  -Primary Team to follow up in AM, attending to follow     2. Chest Discomfort  -Patient       Preston Milligan PA-C  Dept of Medicine

## 2019-12-18 NOTE — PROGRESS NOTE ADULT - SUBJECTIVE AND OBJECTIVE BOX
[facility]   [phone]  RENAL FOLLOW UP NOTE  --------------------------------------------------------------------------------  Chief Complaint:    24 hour events/subjective:    seen earlier. states feels well and sob improved. states is urinating well      PAST HISTORY  --------------------------------------------------------------------------------  No significant changes to PMH, PSH, FHx, SHx, unless otherwise noted    ALLERGIES & MEDICATIONS  --------------------------------------------------------------------------------  Allergies    No Known Allergies    Intolerances      Standing Inpatient Medications  aspirin  chewable 81 milliGRAM(s) Oral daily  atorvastatin 40 milliGRAM(s) Oral at bedtime  budesonide 160 MICROgram(s)/formoterol 4.5 MICROgram(s) Inhaler 2 Puff(s) Inhalation two times a day  dextrose 5%. 1000 milliLiter(s) IV Continuous <Continuous>  dextrose 50% Injectable 12.5 Gram(s) IV Push once  dextrose 50% Injectable 25 Gram(s) IV Push once  furosemide   Injectable 20 milliGRAM(s) IV Push daily  insulin glargine Injectable (LANTUS) 55 Unit(s) SubCutaneous at bedtime  insulin lispro (HumaLOG) corrective regimen sliding scale   SubCutaneous three times a day before meals  insulin lispro (HumaLOG) corrective regimen sliding scale   SubCutaneous at bedtime  insulin lispro Injectable (HumaLOG) 18 Unit(s) SubCutaneous three times a day before meals  metoprolol succinate ER 50 milliGRAM(s) Oral daily  nicotine - 21 mG/24Hr(s) Patch 1 patch Transdermal daily  sacubitril 24 mG/valsartan 26 mG 1 Tablet(s) Oral two times a day  sodium chloride 0.9%. 1000 milliLiter(s) IV Continuous <Continuous>  spironolactone 25 milliGRAM(s) Oral daily  tamsulosin 0.4 milliGRAM(s) Oral at bedtime  ticagrelor 90 milliGRAM(s) Oral every 12 hours  tiotropium 18 MICROgram(s) Capsule 1 Capsule(s) Inhalation daily    PRN Inpatient Medications  acetaminophen   Tablet .. 650 milliGRAM(s) Oral every 4 hours PRN  albuterol/ipratropium for Nebulization 3 milliLiter(s) Nebulizer every 6 hours PRN  dextrose 40% Gel 15 Gram(s) Oral once PRN  glucagon  Injectable 1 milliGRAM(s) IntraMuscular once PRN      REVIEW OF SYSTEMS  --------------------------------------------------------------------------------    Gen: denies fevers/chills,  CVS: denies chest pain/palpitations  Resp: denies SOB/Cough  GI: Denies N/V/Abd pain  : Denies dysuria/oliguria/hematuria    All other systems were reviewed and are negative, except as noted.    VITALS/PHYSICAL EXAM  --------------------------------------------------------------------------------  T(C): 36.8 (12-18-19 @ 21:24), Max: 37.1 (12-18-19 @ 20:15)  HR: 97 (12-18-19 @ 21:24) (97 - 106)  BP: 135/79 (12-18-19 @ 21:24) (101/67 - 135/79)  RR: 18 (12-18-19 @ 21:24) (17 - 18)  SpO2: 93% (12-18-19 @ 21:24) (93% - 99%)  Wt(kg): --        12-17-19 @ 07:01  -  12-18-19 @ 07:00  --------------------------------------------------------  IN: 1560 mL / OUT: 3850 mL / NET: -2290 mL    12-18-19 @ 07:01  -  12-18-19 @ 22:31  --------------------------------------------------------  IN: 920 mL / OUT: 1500 mL / NET: -580 mL      Physical Exam:  	  	Gen: Non toxic comfortable appearing   	no jvd  	Pulm: decrease bs  no rales or ronchi or wheezing  	CV: RRR, S1S2; no rub  	Abd: +BS, soft, nontender/nondistended  	: No suprapubic tenderness  	UE: Warm, no cyanosis  no clubbing,  no edema  	LE: Warm, no cyanosis  no clubbing, no edema  	Neuro: alert and oriented. speech coherent   	    LABS/STUDIES  --------------------------------------------------------------------------------              11.9   6.59  >-----------<  195      [12-18-19 @ 04:19]              37.1     134  |  99  |  38  ----------------------------<  224      [12-18-19 @ 04:19]  3.9   |  20  |  1.50        Ca     9.1     [12-18-19 @ 04:19]      Mg     2.3     [12-18-19 @ 04:19]      Phos  3.8     [12-18-19 @ 04:19]            Creatinine Trend:  SCr 1.50 [12-18 @ 04:19]  SCr 1.58 [12-17 @ 05:15]  SCr 1.79 [12-16 @ 06:16]  SCr 1.75 [12-15 @ 18:45]                  HbA1c 8.9      [12-16-19 @ 08:15]

## 2019-12-18 NOTE — PROGRESS NOTE ADULT - ASSESSMENT
59  M w/pmh T2DM, HTN, CAD/MI s/p stents (most recent 2/2018), HFrEF ( EF 25%) , extensive smoking history quit about two weeks ago, COPD, p/w GREWAL x 2 weeks.   admitted with acute on chronic systolic heart failure and copd exac      CHF exac  acute on chronic systolic  cards following  cont diuresis with lasix 40 iv daily  strict i/o  daily wt  tele  echo noted with dec EF  acs ruled out  AICD interrogation noted  plan for cardiac cath today given decrease EF on Echo  plan to start entresto as per cards     copd exac  pulm consulted  nebs  inhalers    LUIS ALFREDO  CPAP at night    DM  uncontrolled a1c 8.9  endo consulted  insulin as per endo    cad s/p pci  cont asa/brilinta/statin/bb  pending cath today     servando on ckd  renal consulted  avoid nephrotoxins  monitor creat  ivf pre/post cath today as ordered

## 2019-12-18 NOTE — PROGRESS NOTE ADULT - SUBJECTIVE AND OBJECTIVE BOX
Chief complaint  Patient is a 59y old  Male who presents with a chief complaint of SOB x 2 weeks (17 Dec 2019 22:26)   Review of systems  Patient in bed, looks comfortable, no fever, no hypoglycemia.    Labs and Fingersticks  CAPILLARY BLOOD GLUCOSE      POCT Blood Glucose.: 243 mg/dL (18 Dec 2019 07:57)  POCT Blood Glucose.: 229 mg/dL (17 Dec 2019 21:14)  POCT Blood Glucose.: 128 mg/dL (17 Dec 2019 16:38)  POCT Blood Glucose.: 157 mg/dL (17 Dec 2019 11:47)      Anion Gap, Serum: 15 (12-18 @ 04:19)  Anion Gap, Serum: 14 (12-17 @ 05:15)      Calcium, Total Serum: 9.1 (12-18 @ 04:19)  Calcium, Total Serum: 9.7 (12-17 @ 05:15)          12-18    134<L>  |  99  |  38<H>  ----------------------------<  224<H>  3.9   |  20<L>  |  1.50<H>    Ca    9.1      18 Dec 2019 04:19  Phos  3.8     12-18  Mg     2.3     12-18                          11.9   6.59  )-----------( 195      ( 18 Dec 2019 04:19 )             37.1     Medications  MEDICATIONS  (STANDING):  aspirin  chewable 81 milliGRAM(s) Oral daily  atorvastatin 40 milliGRAM(s) Oral at bedtime  budesonide 160 MICROgram(s)/formoterol 4.5 MICROgram(s) Inhaler 2 Puff(s) Inhalation two times a day  dextrose 5%. 1000 milliLiter(s) (50 mL/Hr) IV Continuous <Continuous>  dextrose 50% Injectable 12.5 Gram(s) IV Push once  dextrose 50% Injectable 25 Gram(s) IV Push once  furosemide   Injectable 20 milliGRAM(s) IV Push daily  heparin  Injectable 5000 Unit(s) SubCutaneous every 12 hours  insulin glargine Injectable (LANTUS) 55 Unit(s) SubCutaneous at bedtime  insulin lispro (HumaLOG) corrective regimen sliding scale   SubCutaneous three times a day before meals  insulin lispro (HumaLOG) corrective regimen sliding scale   SubCutaneous at bedtime  insulin lispro Injectable (HumaLOG) 18 Unit(s) SubCutaneous three times a day before meals  metoprolol succinate ER 50 milliGRAM(s) Oral daily  nicotine - 21 mG/24Hr(s) Patch 1 patch Transdermal daily  sacubitril 24 mG/valsartan 26 mG 1 Tablet(s) Oral two times a day  spironolactone 25 milliGRAM(s) Oral daily  tamsulosin 0.4 milliGRAM(s) Oral at bedtime  ticagrelor 90 milliGRAM(s) Oral every 12 hours  tiotropium 18 MICROgram(s) Capsule 1 Capsule(s) Inhalation daily      Physical Exam  General: Patient comfortable in bed  Vital Signs Last 12 Hrs  T(F): 98.2 (12-18-19 @ 03:57), Max: 98.2 (12-18-19 @ 03:57)  HR: 99 (12-18-19 @ 03:57) (99 - 99)  BP: 121/86 (12-18-19 @ 03:57) (121/86 - 121/86)  BP(mean): --  RR: 18 (12-18-19 @ 03:57) (18 - 18)  SpO2: 94% (12-18-19 @ 03:57) (94% - 94%)  Neck: No palpable thyroid nodules.  CVS: S1S2, No murmurs  Respiratory: No wheezing, no crepitations  GI: Abdomen soft, bowel sounds positive  Musculoskeletal:  edema lower extremities.   Skin: No skin rashes, no ecchymosis    Diagnostics Chief complaint  Patient is a 59y old  Male who presents with a chief complaint of SOB x 2 weeks (17 Dec 2019 22:26)   Review of systems  Patient in bed, looks comfortable, no fever,  no hypoglycemia.    Labs and Fingersticks  CAPILLARY BLOOD GLUCOSE      POCT Blood Glucose.: 243 mg/dL (18 Dec 2019 07:57)  POCT Blood Glucose.: 229 mg/dL (17 Dec 2019 21:14)  POCT Blood Glucose.: 128 mg/dL (17 Dec 2019 16:38)  POCT Blood Glucose.: 157 mg/dL (17 Dec 2019 11:47)      Anion Gap, Serum: 15 (12-18 @ 04:19)  Anion Gap, Serum: 14 (12-17 @ 05:15)      Calcium, Total Serum: 9.1 (12-18 @ 04:19)  Calcium, Total Serum: 9.7 (12-17 @ 05:15)          12-18    134<L>  |  99  |  38<H>  ----------------------------<  224<H>  3.9   |  20<L>  |  1.50<H>    Ca    9.1      18 Dec 2019 04:19  Phos  3.8     12-18  Mg     2.3     12-18                          11.9   6.59  )-----------( 195      ( 18 Dec 2019 04:19 )             37.1     Medications  MEDICATIONS  (STANDING):  aspirin  chewable 81 milliGRAM(s) Oral daily  atorvastatin 40 milliGRAM(s) Oral at bedtime  budesonide 160 MICROgram(s)/formoterol 4.5 MICROgram(s) Inhaler 2 Puff(s) Inhalation two times a day  dextrose 5%. 1000 milliLiter(s) (50 mL/Hr) IV Continuous <Continuous>  dextrose 50% Injectable 12.5 Gram(s) IV Push once  dextrose 50% Injectable 25 Gram(s) IV Push once  furosemide   Injectable 20 milliGRAM(s) IV Push daily  heparin  Injectable 5000 Unit(s) SubCutaneous every 12 hours  insulin glargine Injectable (LANTUS) 55 Unit(s) SubCutaneous at bedtime  insulin lispro (HumaLOG) corrective regimen sliding scale   SubCutaneous three times a day before meals  insulin lispro (HumaLOG) corrective regimen sliding scale   SubCutaneous at bedtime  insulin lispro Injectable (HumaLOG) 18 Unit(s) SubCutaneous three times a day before meals  metoprolol succinate ER 50 milliGRAM(s) Oral daily  nicotine - 21 mG/24Hr(s) Patch 1 patch Transdermal daily  sacubitril 24 mG/valsartan 26 mG 1 Tablet(s) Oral two times a day  spironolactone 25 milliGRAM(s) Oral daily  tamsulosin 0.4 milliGRAM(s) Oral at bedtime  ticagrelor 90 milliGRAM(s) Oral every 12 hours  tiotropium 18 MICROgram(s) Capsule 1 Capsule(s) Inhalation daily      Physical Exam  General: Patient comfortable in bed  Vital Signs Last 12 Hrs  T(F): 98.2 (12-18-19 @ 03:57), Max: 98.2 (12-18-19 @ 03:57)  HR: 99 (12-18-19 @ 03:57) (99 - 99)  BP: 121/86 (12-18-19 @ 03:57) (121/86 - 121/86)  BP(mean): --  RR: 18 (12-18-19 @ 03:57) (18 - 18)  SpO2: 94% (12-18-19 @ 03:57) (94% - 94%)  Neck: No palpable thyroid nodules.  CVS: S1S2, No murmurs  Respiratory: No wheezing, no crepitations  GI: Abdomen soft, bowel sounds positive  Musculoskeletal:  edema lower extremities.   Skin: No skin rashes, no ecchymosis    Diagnostics

## 2019-12-18 NOTE — PROGRESS NOTE ADULT - SUBJECTIVE AND OBJECTIVE BOX
HPI:  Patient seen and examined at bedside on 4 Suresh.  Interval improvement in renal function noted.    Review Of Systems:           Respiratory: No shortness of breath, cough, or wheezing  Cardiovascular: No chest pain or palpitations  10 point review of systems is otherwise negative except as mentioned above        Medications:  acetaminophen   Tablet .. 650 milliGRAM(s) Oral every 4 hours PRN  albuterol/ipratropium for Nebulization 3 milliLiter(s) Nebulizer every 6 hours PRN  aspirin  chewable 81 milliGRAM(s) Oral daily  atorvastatin 40 milliGRAM(s) Oral at bedtime  budesonide 160 MICROgram(s)/formoterol 4.5 MICROgram(s) Inhaler 2 Puff(s) Inhalation two times a day  dextrose 40% Gel 15 Gram(s) Oral once PRN  dextrose 5%. 1000 milliLiter(s) IV Continuous <Continuous>  dextrose 50% Injectable 12.5 Gram(s) IV Push once  dextrose 50% Injectable 25 Gram(s) IV Push once  furosemide   Injectable 20 milliGRAM(s) IV Push daily  glucagon  Injectable 1 milliGRAM(s) IntraMuscular once PRN  insulin glargine Injectable (LANTUS) 55 Unit(s) SubCutaneous at bedtime  insulin lispro (HumaLOG) corrective regimen sliding scale   SubCutaneous three times a day before meals  insulin lispro (HumaLOG) corrective regimen sliding scale   SubCutaneous at bedtime  insulin lispro Injectable (HumaLOG) 18 Unit(s) SubCutaneous three times a day before meals  metoprolol succinate ER 50 milliGRAM(s) Oral daily  nicotine - 21 mG/24Hr(s) Patch 1 patch Transdermal daily  sacubitril 24 mG/valsartan 26 mG 1 Tablet(s) Oral two times a day  sodium chloride 0.9%. 1000 milliLiter(s) IV Continuous <Continuous>  spironolactone 25 milliGRAM(s) Oral daily  tamsulosin 0.4 milliGRAM(s) Oral at bedtime  ticagrelor 90 milliGRAM(s) Oral every 12 hours  tiotropium 18 MICROgram(s) Capsule 1 Capsule(s) Inhalation daily    PAST MEDICAL & SURGICAL HISTORY:  AICD (automatic cardioverter/defibrillator) present  Diabetes  Stented coronary artery  No significant past surgical history    Vitals:  T(C): 36.5 (19 @ 11:14), Max: 36.8 (12-18-19 @ 03:57)  HR: 104 (19 @ 11:14) (99 - 110)  BP: 129/83 (19 @ 11:14) (121/86 - 130/70)  BP(mean): --  RR: 17 (19 @ 11:14) (17 - 18)  SpO2: 99% (19 @ 11:14) (94% - 99%)  Wt(kg): --  Daily     Daily Weight in k.5 (18 Dec 2019 07:17)  I&O's Summary    17 Dec 2019 07:  -  18 Dec 2019 07:00  --------------------------------------------------------  IN: 1560 mL / OUT: 3850 mL / NET: -2290 mL    18 Dec 2019 07:01  -  18 Dec 2019 17:44  --------------------------------------------------------  IN: 920 mL / OUT: 1500 mL / NET: -580 mL        Physical Exam:  Appearance: Normal, well groomed, NAD  Eyes: PERRLA, EOMI, pink conjunctiva, no scleral icterus   HENT: Normal oral mucosa  Cardiovascular: RRR, S1, S2, no murmur, rub, or gallop; +edema; +JVD  Respiratory: diminished breath sounds throughout  Gastrointestinal: Soft, non-tender, non-distended, BS+  Musculoskeletal: No clubbing or joint deformity   Neurologic: No focal weakness  Lymphatic: No lymphadenopathy  Psychiatry: AAOx3 with appropriate mood and affect  Skin: No rashes, ecchymoses, or cyanosis; +tattoos                          11.9   6.59  )-----------( 195      ( 18 Dec 2019 04:19 )             37.1     18    134<L>  |  99  |  38<H>  ----------------------------<  224<H>  3.9   |  20<L>  |  1.50<H>    Ca    9.1      18 Dec 2019 04:19  Phos  3.8     12-18  Mg     2.3     12-18            Serum Pro-Brain Natriuretic Peptide: 432 pg/mL (15 @ 18:45)      Cardiovascular Diagnostic Testing:  ECG: sinus tach, 100 bpm, normal axis, poor R-wave progression    Echo: < from: TTE with Doppler (w/Cont) (18 @ 06:06) >  ------------------------------------------------------------------------  Dimensions:    Normal Values:  LA:     4.0    2.0 - 4.0 cm  Ao:     3.5    2.0 - 3.8 cm  SEPTUM: 1.0    0.6 - 1.2 cm  PWT:    1.2    0.6 - 1.1 cm  LVIDd:  6.5    3.0 - 5.6 cm  LVIDs:  5.3    1.8 - 4.0 cm  Derived variables:  LVMI: 132 g/m2  RWT: 0.36  EF (Visual Estimate): 20-25 %  ------------------------------------------------------------------------  Observations:  Mitral Valve: Grossly normal mitral valve. Minimal mitral  regurgitation.  Aortic Valve/Aorta: Trileaflet aortic valve. Mean  transaortic valve gradient equals 2 mm Hg, aortic valve  velocity time integral equals 20 cm. No aortic valve  regurgitation seen. Mean gradient is equal to 1 mm Hg, LVOT  velocity time integral equals 11 cm.  Normal aortic root size. (Ao: 3.5 cm at the sinuses of  Valsalva).  Left Atrium: Left atrium not well visualized.  Left Ventricle: Endocardial visualization enhanced with  intravenous injection ofecho contrast (Definity).  Severe  segmental left ventricular systolic dysfunction. The  anteroseptal, inferoseptal, anterior walls and all apical  segments are akinetic.  No left ventricular thrombus.  Swirling of contrast suggests low flow.  Moderate left  ventricular enlargement. Mild diastolic dysfunction (Stage  I).  Right Heart: Normal right atrium. The right ventricle is  not well visualized; grossly normal right ventricular  systolic function. TAPSE > 2.0 cm (normal).  Tricuspid  valve not well visualized. Pulmonic valve not well  visualized, probably normal.  Pericardium/Pleura: Trace pericardial effusion.  Hemodynamic: Estimated right atrial pressure is 8 mm Hg.  Inadequate tricuspid regurgitation Doppler envelope  precludes estimation of RVSP.  ------------------------------------------------------------------------  Conclusions:  1. Endocardial visualization enhanced with intravenous  injection of echo contrast (Definity).  Severe segmental  left ventricular systolic dysfunction. The anteroseptal,  inferoseptal, anterior walls and all apical segments are  akinetic.  No left ventricular thrombus. Swirling of  contrast suggests low flow.  2. Mild diastolic dysfunction (Stage I).  3. Trace pericardial effusion.  *** No previous Echo exam. Technically difficult (portable)  study.  ------------------------------------------------------------------------  Revised on 2018 - 9:14 AM by Major Remy M.D.  ------------------------------------------------------------------------    < end of copied text >    Cath: < from: Cardiac Cath Lab - Adult (18 @ 16:05) >  VENTRICLES: Analysis of regional contractile function demonstrated severe  anterolateral hypokinesis, apical dyskinesis, and diaphragmatic  dyskinesis. EF estimated was 25 %.  CORONARY VESSELS: The coronary circulation is right dominant.  LM:   --  LM: Angiography showed minor luminal irregularities with no flow  limiting lesions.  LAD:   --  Ostial LAD: There was a 100 % stenosis.  CX:   --  Mid circumflex: There was a 30 % stenosis.  RI:   --  Ramus intermedius: There was a 100 % stenosis.  RCA:   --  RCA: Angiography showed minor luminal irregularities with no  flow limiting lesions.  COMPLICATIONS: There were no complications. No complications occurred  during the cath lab visit.  DIAGNOSTIC RECOMMENDATIONS: ASA and Plavix for 1 year.  INTERVENTIONAL RECOMMENDATIONS: ASA and Plavix for 1 year.  Prepared and signed by  Stefan Mireles M.D.  Signed 2018 17:53:51    < end of copied text >    Interpretation of Telemetry: sinus tach

## 2019-12-18 NOTE — PROGRESS NOTE ADULT - PROBLEM SELECTOR PLAN 1
Will increase Lantus to 55u at bedtime, increase Humalog to 18u before each meal, and continue coverage scale. Will continue monitoring FS and FU.  Patient counseled for compliance with consistent low carb diet and exercise as tolerated outpatient. Patient counseled for compliance with consistent low carb diet and exercise as tolerated outpatient.

## 2019-12-18 NOTE — PHARMACOTHERAPY INTERVENTION NOTE - COMMENTS
Prior authorization initiated for valsartan/sacubitril (Entresto) 24/26 mg by mouth two times a day. Prescription sent to [email or web address].    Called [phone] - s/w [place] - dash fax prior authorization form to [address] [place] [address] for provider. Once form filled out, response for authorization usually takes 24-72 hours.    Pharmacy insurance information as per [facility] (s/w [place] [phone])  ID: [id number]  Group: [id number]   PCN: [id number]  BIN: [id number]    SARATH Walton, PharmD, CDE    [phone]

## 2019-12-19 LAB
ANION GAP SERPL CALC-SCNC: 13 MMOL/L — SIGNIFICANT CHANGE UP (ref 5–17)
BUN SERPL-MCNC: 38 MG/DL — HIGH (ref 7–23)
CALCIUM SERPL-MCNC: 9.4 MG/DL — SIGNIFICANT CHANGE UP (ref 8.4–10.5)
CHLORIDE SERPL-SCNC: 100 MMOL/L — SIGNIFICANT CHANGE UP (ref 96–108)
CO2 SERPL-SCNC: 18 MMOL/L — LOW (ref 22–31)
CREAT SERPL-MCNC: 1.62 MG/DL — HIGH (ref 0.5–1.3)
GLUCOSE BLDC GLUCOMTR-MCNC: 102 MG/DL — HIGH (ref 70–99)
GLUCOSE BLDC GLUCOMTR-MCNC: 131 MG/DL — HIGH (ref 70–99)
GLUCOSE BLDC GLUCOMTR-MCNC: 286 MG/DL — HIGH (ref 70–99)
GLUCOSE BLDC GLUCOMTR-MCNC: 306 MG/DL — HIGH (ref 70–99)
GLUCOSE SERPL-MCNC: 321 MG/DL — HIGH (ref 70–99)
HCT VFR BLD CALC: 39 % — SIGNIFICANT CHANGE UP (ref 39–50)
HGB BLD-MCNC: 12.6 G/DL — LOW (ref 13–17)
MAGNESIUM SERPL-MCNC: 2.3 MG/DL — SIGNIFICANT CHANGE UP (ref 1.6–2.6)
MCHC RBC-ENTMCNC: 26.1 PG — LOW (ref 27–34)
MCHC RBC-ENTMCNC: 32.3 GM/DL — SIGNIFICANT CHANGE UP (ref 32–36)
MCV RBC AUTO: 80.7 FL — SIGNIFICANT CHANGE UP (ref 80–100)
PHOSPHATE SERPL-MCNC: 3.9 MG/DL — SIGNIFICANT CHANGE UP (ref 2.5–4.5)
PLATELET # BLD AUTO: 195 K/UL — SIGNIFICANT CHANGE UP (ref 150–400)
POTASSIUM SERPL-MCNC: 4.6 MMOL/L — SIGNIFICANT CHANGE UP (ref 3.5–5.3)
POTASSIUM SERPL-SCNC: 4.6 MMOL/L — SIGNIFICANT CHANGE UP (ref 3.5–5.3)
RBC # BLD: 4.83 M/UL — SIGNIFICANT CHANGE UP (ref 4.2–5.8)
RBC # FLD: 15.2 % — HIGH (ref 10.3–14.5)
SODIUM SERPL-SCNC: 131 MMOL/L — LOW (ref 135–145)
WBC # BLD: 8.4 K/UL — SIGNIFICANT CHANGE UP (ref 3.8–10.5)
WBC # FLD AUTO: 8.4 K/UL — SIGNIFICANT CHANGE UP (ref 3.8–10.5)

## 2019-12-19 PROCEDURE — 99233 SBSQ HOSP IP/OBS HIGH 50: CPT

## 2019-12-19 RX ORDER — CLOPIDOGREL BISULFATE 75 MG/1
75 TABLET, FILM COATED ORAL DAILY
Refills: 0 | Status: DISCONTINUED | OUTPATIENT
Start: 2019-12-20 | End: 2019-12-20

## 2019-12-19 RX ORDER — INSULIN GLARGINE 100 [IU]/ML
68 INJECTION, SOLUTION SUBCUTANEOUS AT BEDTIME
Refills: 0 | Status: DISCONTINUED | OUTPATIENT
Start: 2019-12-19 | End: 2019-12-20

## 2019-12-19 RX ORDER — CLOPIDOGREL BISULFATE 75 MG/1
600 TABLET, FILM COATED ORAL ONCE
Refills: 0 | Status: COMPLETED | OUTPATIENT
Start: 2019-12-19 | End: 2019-12-19

## 2019-12-19 RX ORDER — INSULIN LISPRO 100/ML
23 VIAL (ML) SUBCUTANEOUS
Refills: 0 | Status: DISCONTINUED | OUTPATIENT
Start: 2019-12-19 | End: 2019-12-20

## 2019-12-19 RX ADMIN — Medication 23 UNIT(S): at 12:12

## 2019-12-19 RX ADMIN — Medication 1 PATCH: at 19:00

## 2019-12-19 RX ADMIN — Medication 4: at 12:12

## 2019-12-19 RX ADMIN — SACUBITRIL AND VALSARTAN 1 TABLET(S): 24; 26 TABLET, FILM COATED ORAL at 05:57

## 2019-12-19 RX ADMIN — TICAGRELOR 90 MILLIGRAM(S): 90 TABLET ORAL at 05:56

## 2019-12-19 RX ADMIN — Medication 50 MILLIGRAM(S): at 05:56

## 2019-12-19 RX ADMIN — Medication 3: at 08:16

## 2019-12-19 RX ADMIN — SACUBITRIL AND VALSARTAN 1 TABLET(S): 24; 26 TABLET, FILM COATED ORAL at 17:02

## 2019-12-19 RX ADMIN — CLOPIDOGREL BISULFATE 600 MILLIGRAM(S): 75 TABLET, FILM COATED ORAL at 17:02

## 2019-12-19 RX ADMIN — Medication 81 MILLIGRAM(S): at 12:11

## 2019-12-19 RX ADMIN — INSULIN GLARGINE 68 UNIT(S): 100 INJECTION, SOLUTION SUBCUTANEOUS at 22:23

## 2019-12-19 RX ADMIN — Medication 1 PATCH: at 12:11

## 2019-12-19 RX ADMIN — Medication 20 MILLIGRAM(S): at 05:56

## 2019-12-19 RX ADMIN — Medication 23 UNIT(S): at 17:02

## 2019-12-19 RX ADMIN — Medication 1 PATCH: at 06:03

## 2019-12-19 RX ADMIN — Medication 18 UNIT(S): at 08:17

## 2019-12-19 RX ADMIN — TAMSULOSIN HYDROCHLORIDE 0.4 MILLIGRAM(S): 0.4 CAPSULE ORAL at 22:23

## 2019-12-19 RX ADMIN — ATORVASTATIN CALCIUM 40 MILLIGRAM(S): 80 TABLET, FILM COATED ORAL at 22:23

## 2019-12-19 RX ADMIN — Medication 1 PATCH: at 06:02

## 2019-12-19 RX ADMIN — SPIRONOLACTONE 25 MILLIGRAM(S): 25 TABLET, FILM COATED ORAL at 05:56

## 2019-12-19 RX ADMIN — Medication 650 MILLIGRAM(S): at 00:00

## 2019-12-19 RX ADMIN — Medication 1 PATCH: at 12:16

## 2019-12-19 NOTE — PROVIDER CONTACT NOTE (OTHER) - BACKGROUND
Patient is a 58 y/o male w/pmh T2DM, HTN, CAD/MI s/p stents (most recent 2/2018), HFrEF, extensive smoking history quit about two weeks ago ,Pt s/p C

## 2019-12-19 NOTE — PROGRESS NOTE ADULT - SUBJECTIVE AND OBJECTIVE BOX
[facility]   [phone]  RENAL FOLLOW UP NOTE  --------------------------------------------------------------------------------  Chief Complaint:    24 hour events/subjective:      states feels well no new c/o  had NERI x2   LAD        PAST HISTORY  --------------------------------------------------------------------------------  No significant changes to PMH, PSH, FHx, SHx, unless otherwise noted    ALLERGIES & MEDICATIONS  --------------------------------------------------------------------------------  Allergies    No Known Allergies    Intolerances      Standing Inpatient Medications  aspirin  chewable 81 milliGRAM(s) Oral daily  atorvastatin 40 milliGRAM(s) Oral at bedtime  budesonide 160 MICROgram(s)/formoterol 4.5 MICROgram(s) Inhaler 2 Puff(s) Inhalation two times a day  dextrose 5%. 1000 milliLiter(s) IV Continuous <Continuous>  dextrose 50% Injectable 12.5 Gram(s) IV Push once  dextrose 50% Injectable 25 Gram(s) IV Push once  heparin  Injectable 5000 Unit(s) SubCutaneous every 12 hours  insulin glargine Injectable (LANTUS) 68 Unit(s) SubCutaneous at bedtime  insulin lispro (HumaLOG) corrective regimen sliding scale   SubCutaneous three times a day before meals  insulin lispro (HumaLOG) corrective regimen sliding scale   SubCutaneous at bedtime  insulin lispro Injectable (HumaLOG) 23 Unit(s) SubCutaneous three times a day before meals  metoprolol succinate ER 50 milliGRAM(s) Oral daily  nicotine - 21 mG/24Hr(s) Patch 1 patch Transdermal daily  sacubitril 24 mG/valsartan 26 mG 1 Tablet(s) Oral two times a day  spironolactone 25 milliGRAM(s) Oral daily  tamsulosin 0.4 milliGRAM(s) Oral at bedtime  tiotropium 18 MICROgram(s) Capsule 1 Capsule(s) Inhalation daily    PRN Inpatient Medications  acetaminophen   Tablet .. 650 milliGRAM(s) Oral every 4 hours PRN  albuterol/ipratropium for Nebulization 3 milliLiter(s) Nebulizer every 6 hours PRN  dextrose 40% Gel 15 Gram(s) Oral once PRN  glucagon  Injectable 1 milliGRAM(s) IntraMuscular once PRN      REVIEW OF SYSTEMS  --------------------------------------------------------------------------------    Gen: denies  fevers/chills,  CVS: denies chest pain/palpitations  Resp: denies SOB/Cough  GI: Denies N/V/Abd pain  : Denies dysuria/oliguria/hematuria    All other systems were reviewed and are negative, except as noted.    VITALS/PHYSICAL EXAM  --------------------------------------------------------------------------------  T(C): 36.6 (12-19-19 @ 11:55), Max: 37.1 (12-18-19 @ 20:15)  HR: 92 (12-19-19 @ 11:55) (90 - 106)  BP: 115/78 (12-19-19 @ 11:55) (101/67 - 135/79)  RR: 18 (12-19-19 @ 11:55) (17 - 18)  SpO2: 95% (12-19-19 @ 11:55) (93% - 99%)  Wt(kg): --        12-18-19 @ 07:01  -  12-19-19 @ 07:00  --------------------------------------------------------  IN: 1160 mL / OUT: 1500 mL / NET: -340 mL      Physical Exam:  	  	Gen: Non toxic comfortable appearing   	no jvd  	Pulm: decrease bs  no rales or ronchi or wheezing  	CV: RRR, S1S2; no rub  	Abd: +BS, soft, nontender/nondistended  	: No suprapubic tenderness  	UE: Warm, no cyanosis  no clubbing,  no edema  	LE: Warm, no cyanosis  no clubbing, no edema  	Neuro: alert and oriented. speech coherent   	  LABS/STUDIES  --------------------------------------------------------------------------------              12.6   8.40  >-----------<  195      [12-19-19 @ 08:10]              39.0     131  |  100  |  38  ----------------------------<  321      [12-19-19 @ 05:41]  4.6   |  18  |  1.62        Ca     9.4     [12-19-19 @ 05:41]      Mg     2.3     [12-19-19 @ 05:41]      Phos  3.9     [12-19-19 @ 05:41]            Creatinine Trend:  SCr 1.62 [12-19 @ 05:41]  SCr 1.50 [12-18 @ 04:19]  SCr 1.58 [12-17 @ 05:15]  SCr 1.79 [12-16 @ 06:16]  SCr 1.75 [12-15 @ 18:45]                  HbA1c 8.9      [12-16-19 @ 08:15]

## 2019-12-19 NOTE — PROGRESS NOTE ADULT - SUBJECTIVE AND OBJECTIVE BOX
SEVERIANO MARTINEZ  59y Male  MRN:[id number]    Patient is a 59y old  Male who presents with a chief complaint of SOB x 2 weeks (15 Dec 2019 22:34)    HPI:  Patient is a 58 y/o male w/pmh T2DM, HTN, CAD/MI s/p stents (most recent 2/2018), HFrEF, extensive smoking history quit about two weeks ago, newly diagnosed COPD and currently on trelegy, presents for worsened dyspnea on exertion over the past two weeks.   Patient reports , shortness of breath after a few steps, he was previously able to walk about a block . He was evaluated by pulmonary and diagnosed with mild COPD and started on Trelegy , however symptoms continued to worsen. He reports mild lower extremity edema . He reports increased orthopnea , but no PND episodes. He reports a non productive cough , no wheezing. He has no fever or chills , no flu-like symptoms. on day of admission he had one episode of chest pain  which occurred while laying down after coughing, pressure like, located in the substernal region , centrally located , lasted a few seconds and self resolved. (15 Dec 2019 22:34)      Patient seen and evaluated at bedside. No acute events overnight except as noted.    Interval HPI: s/p cath with pci    PAST MEDICAL & SURGICAL HISTORY:  AICD (automatic cardioverter/defibrillator) present  Diabetes  Stented coronary artery  No significant past surgical history      REVIEW OF SYSTEMS:  as per hpi    VITALS:  Vital Signs Last 24 Hrs  T(C): 36.6 (19 Dec 2019 11:55), Max: 37.1 (18 Dec 2019 20:15)  T(F): 97.8 (19 Dec 2019 11:55), Max: 98.8 (18 Dec 2019 20:15)  HR: 92 (19 Dec 2019 11:55) (90 - 106)  BP: 115/78 (19 Dec 2019 11:55) (101/67 - 135/79)  BP(mean): --  RR: 18 (19 Dec 2019 11:55) (17 - 18)  SpO2: 95% (19 Dec 2019 11:55) (93% - 99%)      PHYSICAL EXAM:  GENERAL: NAD, well-developed  HEAD:  Atraumatic, Normocephalic  EYES: EOMI, PERRLA, conjunctiva and sclera clear  NECK: Supple, No JVD  CHEST/LUNG: Clear to auscultation bilaterally; No wheeze  HEART: S1, S2; No murmurs, rubs, or gallops  ABDOMEN: Soft, Nontender, Nondistended; Bowel sounds present  EXTREMITIES:  2+ Peripheral Pulses, No clubbing, cyanosis, or edema  PSYCH: Normal affect  NEUROLOGY: AAOX3; non-focal  SKIN: No rashes or lesions    Consultant(s) Notes Reviewed:  [x ] YES  [ ] NO  Care Discussed with Consultants/Other Providers [ x] YES  [ ] NO    MEDS:  MEDICATIONS  (STANDING):  aspirin  chewable 81 milliGRAM(s) Oral daily  atorvastatin 40 milliGRAM(s) Oral at bedtime  budesonide 160 MICROgram(s)/formoterol 4.5 MICROgram(s) Inhaler 2 Puff(s) Inhalation two times a day  clopidogrel Tablet 600 milliGRAM(s) Oral once  dextrose 5%. 1000 milliLiter(s) (50 mL/Hr) IV Continuous <Continuous>  dextrose 50% Injectable 12.5 Gram(s) IV Push once  dextrose 50% Injectable 25 Gram(s) IV Push once  furosemide   Injectable 20 milliGRAM(s) IV Push daily  heparin  Injectable 5000 Unit(s) SubCutaneous every 12 hours  insulin glargine Injectable (LANTUS) 68 Unit(s) SubCutaneous at bedtime  insulin lispro (HumaLOG) corrective regimen sliding scale   SubCutaneous three times a day before meals  insulin lispro (HumaLOG) corrective regimen sliding scale   SubCutaneous at bedtime  insulin lispro Injectable (HumaLOG) 23 Unit(s) SubCutaneous three times a day before meals  metoprolol succinate ER 50 milliGRAM(s) Oral daily  nicotine - 21 mG/24Hr(s) Patch 1 patch Transdermal daily  sacubitril 24 mG/valsartan 26 mG 1 Tablet(s) Oral two times a day  sodium chloride 0.9%. 1000 milliLiter(s) (70 mL/Hr) IV Continuous <Continuous>  spironolactone 25 milliGRAM(s) Oral daily  tamsulosin 0.4 milliGRAM(s) Oral at bedtime  tiotropium 18 MICROgram(s) Capsule 1 Capsule(s) Inhalation daily    MEDICATIONS  (PRN):  acetaminophen   Tablet .. 650 milliGRAM(s) Oral every 4 hours PRN Mild Pain (1 - 3)  albuterol/ipratropium for Nebulization 3 milliLiter(s) Nebulizer every 6 hours PRN Shortness of Breath and/or Wheezing  dextrose 40% Gel 15 Gram(s) Oral once PRN Blood Glucose LESS THAN 70 milliGRAM(s)/deciliter  glucagon  Injectable 1 milliGRAM(s) IntraMuscular once PRN Glucose LESS THAN 70 milligrams/deciliter      ALLERGIES:  No Known Allergies      LABS:                                             12.6   8.40  )-----------( 195      ( 19 Dec 2019 08:10 )             39.0   12-19    131<L>  |  100  |  38<H>  ----------------------------<  321<H>  4.6   |  18<L>  |  1.62<H>    Ca    9.4      19 Dec 2019 05:41  Phos  3.9     12-19  Mg     2.3     12-19          < from: TTE with Doppler (w/Cont) (12.17.19 @ 06:40) >  --------------------------------------  Conclusions:  1. Tethered mitral valve leaflets with normal opening.  Mild-moderate mitral regurgitation.  2. Calcified trileaflet aortic valve with normal opening.  No aortic valve regurgitation seen.  3. Eccentric left ventricular hypertrophy (dilated left  ventricle with normal relative wall thickness).  4. Endocardial visualization enhanced with intravenous  injection of Ultrasonic Enhancing Agent (Definity). Severe  global left ventricular systolic dysfunction. No left  ventricular thrombus.  5. The right ventricle is not well visualized; grossly  normal right ventricular systolic function. A device wire  is noted in the right heart.  6. Trace pericardial effusion.  *** Compared with echocardiogram of 2/8/2018, results are  similar on today's study.  ---------------------------------------------    < end of copied text >

## 2019-12-19 NOTE — PROGRESS NOTE ADULT - SUBJECTIVE AND OBJECTIVE BOX
Patient seen and examined at bedside.    Overnight Events: S/p LHC yesterday via radial artery.       REVIEW OF SYSTEMS:  Constitutional:     No fevers, chills, weight loss, weight gain  HEENT:                  No dry eyes, nasal congestion, postnasal drip  CV:                         No chest pain, palpitations, orthopnea, PND  Resp:                     No cough, SOB, dyspnea, wheezing, sputum  GI:                          No nausea, vomiting, abdominal pain, diarrhea, constipation  :                        No dysuria, nocturia, hematuria, increased urinary frequency  Musculoskeletal: No back pain, myalgias, arthralgias   Skin:                       No rash, pruritus, ecchymoses  Neurological:        No headache, dizziness, syncope, weakness, numbness  Psychiatric:           No anxiety, depression   Endocrine:            No hot/cold intolerance, polydipsia  Heme/Lymph:      No bleeding, easy bruising  Allergic/Immune: No itchy eyes, rhinorrhea, hives angioedema      Current Meds:  acetaminophen   Tablet .. 650 milliGRAM(s) Oral every 4 hours PRN  albuterol/ipratropium for Nebulization 3 milliLiter(s) Nebulizer every 6 hours PRN  aspirin  chewable 81 milliGRAM(s) Oral daily  atorvastatin 40 milliGRAM(s) Oral at bedtime  budesonide 160 MICROgram(s)/formoterol 4.5 MICROgram(s) Inhaler 2 Puff(s) Inhalation two times a day  dextrose 40% Gel 15 Gram(s) Oral once PRN  dextrose 5%. 1000 milliLiter(s) IV Continuous <Continuous>  dextrose 50% Injectable 12.5 Gram(s) IV Push once  dextrose 50% Injectable 25 Gram(s) IV Push once  furosemide   Injectable 20 milliGRAM(s) IV Push daily  glucagon  Injectable 1 milliGRAM(s) IntraMuscular once PRN  heparin  Injectable 5000 Unit(s) SubCutaneous every 12 hours  insulin glargine Injectable (LANTUS) 55 Unit(s) SubCutaneous at bedtime  insulin lispro (HumaLOG) corrective regimen sliding scale   SubCutaneous three times a day before meals  insulin lispro (HumaLOG) corrective regimen sliding scale   SubCutaneous at bedtime  insulin lispro Injectable (HumaLOG) 18 Unit(s) SubCutaneous three times a day before meals  metoprolol succinate ER 50 milliGRAM(s) Oral daily  nicotine - 21 mG/24Hr(s) Patch 1 patch Transdermal daily  sacubitril 24 mG/valsartan 26 mG 1 Tablet(s) Oral two times a day  sodium chloride 0.9%. 1000 milliLiter(s) IV Continuous <Continuous>  spironolactone 25 milliGRAM(s) Oral daily  tamsulosin 0.4 milliGRAM(s) Oral at bedtime  ticagrelor 90 milliGRAM(s) Oral every 12 hours  tiotropium 18 MICROgram(s) Capsule 1 Capsule(s) Inhalation daily      PAST MEDICAL & SURGICAL HISTORY:  AICD (automatic cardioverter/defibrillator) present  Diabetes  Stented coronary artery  No significant past surgical history      Vitals:  T(F): 97.9 (12-19), Max: 98.8 (12-18)  HR: 90 (12-19) (90 - 106)  BP: 109/69 (12-19) (101/67 - 135/79)  RR: 18 (12-19)  SpO2: 95% (12-19)  I&O's Summary    18 Dec 2019 07:01  -  19 Dec 2019 07:00  --------------------------------------------------------  IN: 1160 mL / OUT: 1500 mL / NET: -340 mL        Physical Exam:  Appearance: No acute distress; well appearing  Eyes: PERRL, EOMI, pink conjunctiva  HENT: Normal oral mucosa  Cardiovascular: RRR, S1, S2, no murmurs, rubs, or gallops; no edema; no JVD  Respiratory: Clear to auscultation bilaterally  Gastrointestinal: soft, non-tender, non-distended with normal bowel sounds  Musculoskeletal: No clubbing; no joint deformity   Neurologic: Non-focal  Lymphatic: No lymphadenopathy  Psychiatry: AAOx3, mood & affect appropriate  Skin: No rashes, ecchymoses, or cyanosis                          12.6   8.40  )-----------( 195      ( 19 Dec 2019 08:10 )             39.0     12-19    131<L>  |  100  |  38<H>  ----------------------------<  321<H>  4.6   |  18<L>  |  1.62<H>    Ca    9.4      19 Dec 2019 05:41  Phos  3.9     12-19  Mg     2.3     12-19            Serum Pro-Brain Natriuretic Peptide: 432 pg/mL (12-15 @ 18:45)          New ECG(s): Personally reviewed    Echo:    Stress Testing:     Cath:    Imaging:    Interpretation of Telemetry: Patient seen and examined at bedside.    Overnight Events: S/p LHC yesterday via radial artery with NERI x2 to pCx. Patient denies CP, SOB, R arm pain.       REVIEW OF SYSTEMS:  Constitutional:     No fevers, chills, weight loss, weight gain  HEENT:                  No dry eyes, nasal congestion, postnasal drip  CV:                         No chest pain, palpitations, orthopnea, PND  Resp:                     No cough, SOB, dyspnea, wheezing, sputum  GI:                          No nausea, vomiting, abdominal pain, diarrhea, constipation  :                        No dysuria, nocturia, hematuria, increased urinary frequency  Musculoskeletal: No back pain, myalgias, arthralgias   Skin:                       No rash, pruritus, ecchymoses  Neurological:        No headache, dizziness, syncope, weakness, numbness  Psychiatric:           No anxiety, depression   Endocrine:            No hot/cold intolerance, polydipsia  Heme/Lymph:      No bleeding, easy bruising  Allergic/Immune: No itchy eyes, rhinorrhea, hives angioedema      Current Meds:  acetaminophen   Tablet .. 650 milliGRAM(s) Oral every 4 hours PRN  albuterol/ipratropium for Nebulization 3 milliLiter(s) Nebulizer every 6 hours PRN  aspirin  chewable 81 milliGRAM(s) Oral daily  atorvastatin 40 milliGRAM(s) Oral at bedtime  budesonide 160 MICROgram(s)/formoterol 4.5 MICROgram(s) Inhaler 2 Puff(s) Inhalation two times a day  dextrose 40% Gel 15 Gram(s) Oral once PRN  dextrose 5%. 1000 milliLiter(s) IV Continuous <Continuous>  dextrose 50% Injectable 12.5 Gram(s) IV Push once  dextrose 50% Injectable 25 Gram(s) IV Push once  furosemide   Injectable 20 milliGRAM(s) IV Push daily  glucagon  Injectable 1 milliGRAM(s) IntraMuscular once PRN  heparin  Injectable 5000 Unit(s) SubCutaneous every 12 hours  insulin glargine Injectable (LANTUS) 55 Unit(s) SubCutaneous at bedtime  insulin lispro (HumaLOG) corrective regimen sliding scale   SubCutaneous three times a day before meals  insulin lispro (HumaLOG) corrective regimen sliding scale   SubCutaneous at bedtime  insulin lispro Injectable (HumaLOG) 18 Unit(s) SubCutaneous three times a day before meals  metoprolol succinate ER 50 milliGRAM(s) Oral daily  nicotine - 21 mG/24Hr(s) Patch 1 patch Transdermal daily  sacubitril 24 mG/valsartan 26 mG 1 Tablet(s) Oral two times a day  sodium chloride 0.9%. 1000 milliLiter(s) IV Continuous <Continuous>  spironolactone 25 milliGRAM(s) Oral daily  tamsulosin 0.4 milliGRAM(s) Oral at bedtime  ticagrelor 90 milliGRAM(s) Oral every 12 hours  tiotropium 18 MICROgram(s) Capsule 1 Capsule(s) Inhalation daily      PAST MEDICAL & SURGICAL HISTORY:  AICD (automatic cardioverter/defibrillator) present  Diabetes  Stented coronary artery  No significant past surgical history      Vitals:  T(F): 97.9 (12-19), Max: 98.8 (12-18)  HR: 90 (12-19) (90 - 106)  BP: 109/69 (12-19) (101/67 - 135/79)  RR: 18 (12-19)  SpO2: 95% (12-19)  I&O's Summary    18 Dec 2019 07:01  -  19 Dec 2019 07:00  --------------------------------------------------------  IN: 1160 mL / OUT: 1500 mL / NET: -340 mL        Physical Exam:  Appearance: No acute distress; well appearing  Eyes: PERRL, EOMI, pink conjunctiva  HENT: Normal oral mucosa  Cardiovascular: RRR, S1, S2, no murmurs, rubs, or gallops; no edema; no JVD, +R radal and ulnar pulses, no oozing or hematoma at site of arteriotomy  Respiratory: Clear to auscultation bilaterally  Gastrointestinal: soft, non-tender, non-distended with normal bowel sounds  Musculoskeletal: No clubbing; no joint deformity   Neurologic: Non-focal  Lymphatic: No lymphadenopathy  Psychiatry: AAOx3, mood & affect appropriate  Skin: No rashes, ecchymoses, or cyanosis                          12.6   8.40  )-----------( 195      ( 19 Dec 2019 08:10 )             39.0     12-19    131<L>  |  100  |  38<H>  ----------------------------<  321<H>  4.6   |  18<L>  |  1.62<H>    Ca    9.4      19 Dec 2019 05:41  Phos  3.9     12-19  Mg     2.3     12-19            Serum Pro-Brain Natriuretic Peptide: 432 pg/mL (12-15 @ 18:45)      Interpretation of Telemetry: SR 80-100s

## 2019-12-19 NOTE — PROGRESS NOTE ADULT - ASSESSMENT
59  M w/pmh T2DM, HTN, CAD/MI s/p stents (most recent 2/2018), HFrEF ( EF 25%) , extensive smoking history quit about two weeks ago, COPD, p/w GREWAL x 2 weeks.   admitted with acute on chronic systolic heart failure and copd exac      CHF exac  acute on chronic systolic  cards following  now improved  strict i/o  daily wt  tele  echo noted with dec EF  acs ruled out  AICD interrogation noted  s/p cath with pci  asa/plavix/statin    CHF  started on entresto  monitor creat     copd exac  pulm consulted  nebs  inhalers    LUIS ALFREDO  CPAP at night (pt refused)    DM  uncontrolled a1c 8.9  endo consulted  insulin as per endo    cad s/p pci  cont asa/plavix/statin/bb       servando on ckd  renal consulted  avoid nephrotoxins  monitor creat  hold lasix in AM pending creat level

## 2019-12-19 NOTE — PROGRESS NOTE ADULT - ASSESSMENT
Assessment  DMT2: 59y Male with DM T2 with hyperglycemia, A1C 8.9% , was on insulin at home (Lantus 50u at bedtime, Humalog 20u before meals), now on basal bolus insulin, increased dose yesterday, blood sugars still elevated and not at target, no hypoglycemic episode. Patient was given stat solumedrol dose yesterday, he is eating full meals in addition to meals brought from home, non compliant with low carb diet, appears comfortable, wants to go home.  HF: on medications, no chest pain, stable, monitored.  COPD: On management, stable, FU Pulm.  MANJIT: Monitor labs/BMP  Obesity: No strict exercise routines, not on any weight loss program, neither on low calorie diet.          Cortney Flanagan MD  Cell: [phone]  Office: [phone] Assessment  DMT2: 59y Male with DM T2 with hyperglycemia, A1C 8.9% , was on insulin at home (Lantus 50u at bedtime, Humalog 20u before meals), now on basal bolus insulin, increased dose yesterday, blood sugars still elevated and not at target,  no hypoglycemic episode. Patient was given stat solumedrol dose yesterday, he is eating full meals in addition to meals brought from home, non compliant with low carb diet, appears comfortable, wants to go home.  HF: on medications, no chest pain, stable, monitored.  COPD: On management, stable, FU Pulm.  MANJIT: Monitor labs/BMP  Obesity: No strict exercise routines, not on any weight loss program, neither on low calorie diet.          Cortney Flanagan MD  Cell: [phone]  Office: [phone]

## 2019-12-19 NOTE — PROGRESS NOTE ADULT - PROBLEM SELECTOR PLAN 1
Will increase Lantus to 68u at bedtime, increase Humalog to 23u before each meal, and continue coverage scale. Will continue monitoring FS and FU.  Patient counseled for compliance with consistent low carb diet and exercise as tolerated outpatient. Patient counseled for compliance with consistent low carb diet and exercise as tolerated outpatient.

## 2019-12-19 NOTE — PROGRESS NOTE ADULT - ASSESSMENT
59 year-old gentleman with cardiovascular history as above including ischemic cardiomyopathy now presents with acute on chronic systolic heart failure in the setting of recent dietary indiscretions.  Interrogation of [facility] ICD to evaluate for burden of VT showed no sustained episodes.  Left heart cath showed disease progression - now status post PCI to LCx.    Continue DAPT, high intensity statin, and beta-blocker at home dose.  Start Entresto. Prior authorization obtained.    Diuresis as tolerated.  Keep O > I, K > 4.0, Mag > 2.0  Check daily standing weights.    Recommend counselling with nutritionist regarding low salt diet.  Diet and exercise.

## 2019-12-19 NOTE — PROGRESS NOTE ADULT - ASSESSMENT
60 y/o male w/pmh T2DM, HTN, CAD/MI, ischemic cardiomyopathy,  s/p stents (most recent 2/2018), HFrEF, extensive smoking history quit about two weeks ago, newly diagnosed COPD and currently on trelegy, presented for worsened dyspnea on exertion over the past two weeks.  on day of admission he had one episode of chest pain  which occurred while laying down after coughing, pressure like, located in the substernal region , centrally located , lasted a few seconds and self resolved.  dx with chf. started on lasix. on admission cr also noted to be elevated.     1- CKD III   2- ischemic cardiomyopathy   3- HTN   4- BPH      creatinine likely  baseline  range now  suspect ckd III in setting of ICM   on entresto now monitor uo and cr  cont flomax 0.4 mg qd

## 2019-12-19 NOTE — PROGRESS NOTE ADULT - ATTENDING COMMENTS
Will increase Lantus to 68u at bedtime, increase Humalog to 23u before each meal, and continue coverage scale. Will continue monitoring FS and FU.

## 2019-12-19 NOTE — PROGRESS NOTE ADULT - SUBJECTIVE AND OBJECTIVE BOX
Chief complaint  Patient is a 59y old  Male who presents with a chief complaint of SOB x 2 weeks (19 Dec 2019 08:56)   Review of systems  Patient sitting up in chair, looks comfortable, no fever, no hypoglycemia.    Labs and Fingersticks  CAPILLARY BLOOD GLUCOSE      POCT Blood Glucose.: 306 mg/dL (19 Dec 2019 11:52)  POCT Blood Glucose.: 286 mg/dL (19 Dec 2019 07:46)  POCT Blood Glucose.: 178 mg/dL (18 Dec 2019 21:26)  POCT Blood Glucose.: 194 mg/dL (18 Dec 2019 18:43)  POCT Blood Glucose.: 169 mg/dL (18 Dec 2019 16:19)      Anion Gap, Serum: 13 (12-19 @ 05:41)  Anion Gap, Serum: 15 (12-18 @ 04:19)      Calcium, Total Serum: 9.4 (12-19 @ 05:41)  Calcium, Total Serum: 9.1 (12-18 @ 04:19)          12-19    131<L>  |  100  |  38<H>  ----------------------------<  321<H>  4.6   |  18<L>  |  1.62<H>    Ca    9.4      19 Dec 2019 05:41  Phos  3.9     12-19  Mg     2.3     12-19                          12.6   8.40  )-----------( 195      ( 19 Dec 2019 08:10 )             39.0     Medications  MEDICATIONS  (STANDING):  aspirin  chewable 81 milliGRAM(s) Oral daily  atorvastatin 40 milliGRAM(s) Oral at bedtime  budesonide 160 MICROgram(s)/formoterol 4.5 MICROgram(s) Inhaler 2 Puff(s) Inhalation two times a day  clopidogrel Tablet 600 milliGRAM(s) Oral once  dextrose 5%. 1000 milliLiter(s) (50 mL/Hr) IV Continuous <Continuous>  dextrose 50% Injectable 12.5 Gram(s) IV Push once  dextrose 50% Injectable 25 Gram(s) IV Push once  furosemide   Injectable 20 milliGRAM(s) IV Push daily  heparin  Injectable 5000 Unit(s) SubCutaneous every 12 hours  insulin glargine Injectable (LANTUS) 68 Unit(s) SubCutaneous at bedtime  insulin lispro (HumaLOG) corrective regimen sliding scale   SubCutaneous three times a day before meals  insulin lispro (HumaLOG) corrective regimen sliding scale   SubCutaneous at bedtime  insulin lispro Injectable (HumaLOG) 23 Unit(s) SubCutaneous three times a day before meals  metoprolol succinate ER 50 milliGRAM(s) Oral daily  nicotine - 21 mG/24Hr(s) Patch 1 patch Transdermal daily  sacubitril 24 mG/valsartan 26 mG 1 Tablet(s) Oral two times a day  sodium chloride 0.9%. 1000 milliLiter(s) (70 mL/Hr) IV Continuous <Continuous>  spironolactone 25 milliGRAM(s) Oral daily  tamsulosin 0.4 milliGRAM(s) Oral at bedtime  tiotropium 18 MICROgram(s) Capsule 1 Capsule(s) Inhalation daily      Physical Exam  General: Patient comfortable in bed  Vital Signs Last 12 Hrs  T(F): 97.8 (12-19-19 @ 11:55), Max: 97.9 (12-19-19 @ 04:32)  HR: 92 (12-19-19 @ 11:55) (90 - 92)  BP: 115/78 (12-19-19 @ 11:55) (109/69 - 115/78)  BP(mean): --  RR: 18 (12-19-19 @ 11:55) (18 - 18)  SpO2: 95% (12-19-19 @ 11:55) (95% - 95%)  Neck: No palpable thyroid nodules.  CVS: S1S2, No murmurs  Respiratory: No wheezing, no crepitations  GI: Abdomen soft, bowel sounds positive  Musculoskeletal:  edema lower extremities.   Skin: No skin rashes, no ecchymosis    Diagnostics Chief complaint  Patient is a 59y old  Male who presents with a chief complaint of SOB x 2 weeks (19 Dec 2019 08:56)   Review of systems  Patient sitting up in chair, looks comfortable, no fever,  no hypoglycemia.    Labs and Fingersticks  CAPILLARY BLOOD GLUCOSE      POCT Blood Glucose.: 306 mg/dL (19 Dec 2019 11:52)  POCT Blood Glucose.: 286 mg/dL (19 Dec 2019 07:46)  POCT Blood Glucose.: 178 mg/dL (18 Dec 2019 21:26)  POCT Blood Glucose.: 194 mg/dL (18 Dec 2019 18:43)  POCT Blood Glucose.: 169 mg/dL (18 Dec 2019 16:19)      Anion Gap, Serum: 13 (12-19 @ 05:41)  Anion Gap, Serum: 15 (12-18 @ 04:19)      Calcium, Total Serum: 9.4 (12-19 @ 05:41)  Calcium, Total Serum: 9.1 (12-18 @ 04:19)          12-19    131<L>  |  100  |  38<H>  ----------------------------<  321<H>  4.6   |  18<L>  |  1.62<H>    Ca    9.4      19 Dec 2019 05:41  Phos  3.9     12-19  Mg     2.3     12-19                          12.6   8.40  )-----------( 195      ( 19 Dec 2019 08:10 )             39.0     Medications  MEDICATIONS  (STANDING):  aspirin  chewable 81 milliGRAM(s) Oral daily  atorvastatin 40 milliGRAM(s) Oral at bedtime  budesonide 160 MICROgram(s)/formoterol 4.5 MICROgram(s) Inhaler 2 Puff(s) Inhalation two times a day  clopidogrel Tablet 600 milliGRAM(s) Oral once  dextrose 5%. 1000 milliLiter(s) (50 mL/Hr) IV Continuous <Continuous>  dextrose 50% Injectable 12.5 Gram(s) IV Push once  dextrose 50% Injectable 25 Gram(s) IV Push once  furosemide   Injectable 20 milliGRAM(s) IV Push daily  heparin  Injectable 5000 Unit(s) SubCutaneous every 12 hours  insulin glargine Injectable (LANTUS) 68 Unit(s) SubCutaneous at bedtime  insulin lispro (HumaLOG) corrective regimen sliding scale   SubCutaneous three times a day before meals  insulin lispro (HumaLOG) corrective regimen sliding scale   SubCutaneous at bedtime  insulin lispro Injectable (HumaLOG) 23 Unit(s) SubCutaneous three times a day before meals  metoprolol succinate ER 50 milliGRAM(s) Oral daily  nicotine - 21 mG/24Hr(s) Patch 1 patch Transdermal daily  sacubitril 24 mG/valsartan 26 mG 1 Tablet(s) Oral two times a day  sodium chloride 0.9%. 1000 milliLiter(s) (70 mL/Hr) IV Continuous <Continuous>  spironolactone 25 milliGRAM(s) Oral daily  tamsulosin 0.4 milliGRAM(s) Oral at bedtime  tiotropium 18 MICROgram(s) Capsule 1 Capsule(s) Inhalation daily      Physical Exam  General: Patient comfortable in bed  Vital Signs Last 12 Hrs  T(F): 97.8 (12-19-19 @ 11:55), Max: 97.9 (12-19-19 @ 04:32)  HR: 92 (12-19-19 @ 11:55) (90 - 92)  BP: 115/78 (12-19-19 @ 11:55) (109/69 - 115/78)  BP(mean): --  RR: 18 (12-19-19 @ 11:55) (18 - 18)  SpO2: 95% (12-19-19 @ 11:55) (95% - 95%)  Neck: No palpable thyroid nodules.  CVS: S1S2, No murmurs  Respiratory: No wheezing, no crepitations  GI: Abdomen soft, bowel sounds positive  Musculoskeletal:  edema lower extremities.   Skin: No skin rashes, no ecchymosis    Diagnostics

## 2019-12-19 NOTE — PROGRESS NOTE ADULT - ASSESSMENT
59M with T2DM, HTN, CAD/MI s/p stents (most recent 2/2018), HFrEF (EF 25%) , extensive smoking history, COPD, p/w GREWAL x 2 weeks. S/p C 12/18/19.     - Continue ASA 81 mg daily and Brilinta 90 mg BID  - Send Brilinta to pharmacy to check co-pay    IVORY Paul MD  Cardiology Fellow  All cardiology service information may be found 24/7 on [email or web address], password: [email or web address]  [phone] 59M with T2DM, HTN, CAD/MI s/p stents (most recent 2/2018), HFrEF (EF 25%) , extensive smoking history, COPD, p/w GREWAL x 2 weeks. S/p C 12/18/19.     - Continue ASA 81 mg daily, rilinta 90 mg BID, statin  - Consider switching to Plavix as patient cannot afford Brilinta co-pay, would re-load with Plavix  - Further management as per Dr. Aure Paul MD  Cardiology Fellow  All cardiology service information may be found 24/7 on [email or web address], password: [email or web address]  [phone] 59M with T2DM, HTN, CAD/MI s/p stents (most recent 2/2018), HFrEF (EF 25%) , extensive smoking history, COPD, p/w GREWAL x 2 weeks. S/p C 12/18/19.     - Continue ASA 81 mg daily, statin  - Consider switching to Plavix as patient cannot afford Brilinta co-pay, would re-load with Plavix 600 mg x1 12 hours after last Brilinta dose followed by Plavix 75 mg daily   - Further management as per Dr. Aure Paul MD  Cardiology Fellow  All cardiology service information may be found 24/7 on [email or web address], password: [email or web address]  [phone]

## 2019-12-20 ENCOUNTER — TRANSCRIPTION ENCOUNTER (OUTPATIENT)
Age: 59
End: 2019-12-20

## 2019-12-20 VITALS
SYSTOLIC BLOOD PRESSURE: 98 MMHG | HEART RATE: 98 BPM | OXYGEN SATURATION: 98 % | RESPIRATION RATE: 18 BRPM | TEMPERATURE: 98 F | DIASTOLIC BLOOD PRESSURE: 67 MMHG

## 2019-12-20 LAB
ANION GAP SERPL CALC-SCNC: 16 MMOL/L — SIGNIFICANT CHANGE UP (ref 5–17)
BUN SERPL-MCNC: 36 MG/DL — HIGH (ref 7–23)
CALCIUM SERPL-MCNC: 9.2 MG/DL — SIGNIFICANT CHANGE UP (ref 8.4–10.5)
CHLORIDE SERPL-SCNC: 100 MMOL/L — SIGNIFICANT CHANGE UP (ref 96–108)
CO2 SERPL-SCNC: 20 MMOL/L — LOW (ref 22–31)
CREAT SERPL-MCNC: 1.42 MG/DL — HIGH (ref 0.5–1.3)
GLUCOSE BLDC GLUCOMTR-MCNC: 129 MG/DL — HIGH (ref 70–99)
GLUCOSE BLDC GLUCOMTR-MCNC: 168 MG/DL — HIGH (ref 70–99)
GLUCOSE BLDC GLUCOMTR-MCNC: 98 MG/DL — SIGNIFICANT CHANGE UP (ref 70–99)
GLUCOSE SERPL-MCNC: 160 MG/DL — HIGH (ref 70–99)
POTASSIUM SERPL-MCNC: 3.8 MMOL/L — SIGNIFICANT CHANGE UP (ref 3.5–5.3)
POTASSIUM SERPL-SCNC: 3.8 MMOL/L — SIGNIFICANT CHANGE UP (ref 3.5–5.3)
SODIUM SERPL-SCNC: 136 MMOL/L — SIGNIFICANT CHANGE UP (ref 135–145)

## 2019-12-20 PROCEDURE — 93454 CORONARY ARTERY ANGIO S&I: CPT | Mod: 59

## 2019-12-20 PROCEDURE — C9600: CPT | Mod: LC

## 2019-12-20 PROCEDURE — 71046 X-RAY EXAM CHEST 2 VIEWS: CPT

## 2019-12-20 PROCEDURE — 94640 AIRWAY INHALATION TREATMENT: CPT

## 2019-12-20 PROCEDURE — 85610 PROTHROMBIN TIME: CPT

## 2019-12-20 PROCEDURE — 85027 COMPLETE CBC AUTOMATED: CPT

## 2019-12-20 PROCEDURE — C1769: CPT

## 2019-12-20 PROCEDURE — C1874: CPT

## 2019-12-20 PROCEDURE — 86803 HEPATITIS C AB TEST: CPT

## 2019-12-20 PROCEDURE — 99232 SBSQ HOSP IP/OBS MODERATE 35: CPT

## 2019-12-20 PROCEDURE — 85379 FIBRIN DEGRADATION QUANT: CPT

## 2019-12-20 PROCEDURE — 94010 BREATHING CAPACITY TEST: CPT

## 2019-12-20 PROCEDURE — 71275 CT ANGIOGRAPHY CHEST: CPT

## 2019-12-20 PROCEDURE — 83735 ASSAY OF MAGNESIUM: CPT

## 2019-12-20 PROCEDURE — 93005 ELECTROCARDIOGRAM TRACING: CPT

## 2019-12-20 PROCEDURE — 85730 THROMBOPLASTIN TIME PARTIAL: CPT

## 2019-12-20 PROCEDURE — 82962 GLUCOSE BLOOD TEST: CPT

## 2019-12-20 PROCEDURE — 84100 ASSAY OF PHOSPHORUS: CPT

## 2019-12-20 PROCEDURE — 83880 ASSAY OF NATRIURETIC PEPTIDE: CPT

## 2019-12-20 PROCEDURE — 96374 THER/PROPH/DIAG INJ IV PUSH: CPT

## 2019-12-20 PROCEDURE — 84484 ASSAY OF TROPONIN QUANT: CPT

## 2019-12-20 PROCEDURE — 94660 CPAP INITIATION&MGMT: CPT

## 2019-12-20 PROCEDURE — 80053 COMPREHEN METABOLIC PANEL: CPT

## 2019-12-20 PROCEDURE — C1894: CPT

## 2019-12-20 PROCEDURE — C8929: CPT

## 2019-12-20 PROCEDURE — 83036 HEMOGLOBIN GLYCOSYLATED A1C: CPT

## 2019-12-20 PROCEDURE — 99152 MOD SED SAME PHYS/QHP 5/>YRS: CPT

## 2019-12-20 PROCEDURE — C1887: CPT

## 2019-12-20 PROCEDURE — 99153 MOD SED SAME PHYS/QHP EA: CPT

## 2019-12-20 PROCEDURE — 99285 EMERGENCY DEPT VISIT HI MDM: CPT | Mod: 25

## 2019-12-20 PROCEDURE — 96375 TX/PRO/DX INJ NEW DRUG ADDON: CPT

## 2019-12-20 PROCEDURE — 80048 BASIC METABOLIC PNL TOTAL CA: CPT

## 2019-12-20 RX ORDER — CLOPIDOGREL BISULFATE 75 MG/1
1 TABLET, FILM COATED ORAL
Qty: 30 | Refills: 0
Start: 2019-12-20 | End: 2020-01-18

## 2019-12-20 RX ORDER — SACUBITRIL AND VALSARTAN 24; 26 MG/1; MG/1
1 TABLET, FILM COATED ORAL
Qty: 60 | Refills: 0
Start: 2019-12-20 | End: 2020-01-18

## 2019-12-20 RX ORDER — ATORVASTATIN CALCIUM 80 MG/1
1 TABLET, FILM COATED ORAL
Qty: 30 | Refills: 0
Start: 2019-12-20 | End: 2020-01-18

## 2019-12-20 RX ADMIN — Medication 81 MILLIGRAM(S): at 12:05

## 2019-12-20 RX ADMIN — Medication 50 MILLIGRAM(S): at 05:40

## 2019-12-20 RX ADMIN — Medication 1 PATCH: at 12:05

## 2019-12-20 RX ADMIN — SPIRONOLACTONE 25 MILLIGRAM(S): 25 TABLET, FILM COATED ORAL at 05:40

## 2019-12-20 RX ADMIN — Medication 1 PATCH: at 12:00

## 2019-12-20 RX ADMIN — Medication 23 UNIT(S): at 08:26

## 2019-12-20 RX ADMIN — CLOPIDOGREL BISULFATE 75 MILLIGRAM(S): 75 TABLET, FILM COATED ORAL at 12:05

## 2019-12-20 RX ADMIN — SACUBITRIL AND VALSARTAN 1 TABLET(S): 24; 26 TABLET, FILM COATED ORAL at 05:40

## 2019-12-20 RX ADMIN — Medication 1: at 08:26

## 2019-12-20 RX ADMIN — Medication 1 PATCH: at 08:16

## 2019-12-20 NOTE — DISCHARGE NOTE PROVIDER - NSDCCPCAREPLAN_GEN_ALL_CORE_FT
PRINCIPAL DISCHARGE DIAGNOSIS  Diagnosis: Pulmonary edema  Assessment and Plan of Treatment: resolved  return to the hospital if worsens, difficulty breathing,  take weight daily, if increase by 3 pounds then call physician   Keep follow up appointments with cardiology, pulmonary, pcp, renal and endocrine        SECONDARY DISCHARGE DIAGNOSES  Diagnosis: T2DM (type 2 diabetes mellitus)  Assessment and Plan of Treatment: medications adjusted. Lantus 68u at night, Humalog 23u three times a day before meals. Finger stick sliding scale  0 Unit(s) if Glucose 0 - 250  1 Unit(s) if Glucose 251 - 300  2 Unit(s) if Glucose 301 - 350  3 Unit(s) if Glucose 351 - 400  4 Unit(s) if Glucose Greater Than 400  Follow up with endocrine Dr Flanagan in 4 weeks    Diagnosis: Acute on chronic heart failure with reduced ejection fraction and diastolic dysfunction  Assessment and Plan of Treatment: Acute on chronic heart failure with reduced ejection fraction and diastolic dysfunction Take meds as prescribed. Follow up with PCP in a few days, cardiology in 1 week, pulm in 1 week    Diagnosis: BPH (benign prostatic hyperplasia)  Assessment and Plan of Treatment: BPH (benign prostatic hyperplasia) continue flomax and follow up with renal. outpatient labs, bmp    Diagnosis: MANJIT (acute kidney injury)  Assessment and Plan of Treatment: MANJIT (acute kidney injury)-improved. plan as above

## 2019-12-20 NOTE — PROGRESS NOTE ADULT - SUBJECTIVE AND OBJECTIVE BOX
SEVERIANO MARTINEZ  59y Male  MRN:[id number]    Patient is a 59y old  Male who presents with a chief complaint of SOB x 2 weeks (15 Dec 2019 22:34)    HPI:  Patient is a 58 y/o male w/pmh T2DM, HTN, CAD/MI s/p stents (most recent 2/2018), HFrEF, extensive smoking history quit about two weeks ago, newly diagnosed COPD and currently on trelegy, presents for worsened dyspnea on exertion over the past two weeks.   Patient reports , shortness of breath after a few steps, he was previously able to walk about a block . He was evaluated by pulmonary and diagnosed with mild COPD and started on Trelegy , however symptoms continued to worsen. He reports mild lower extremity edema . He reports increased orthopnea , but no PND episodes. He reports a non productive cough , no wheezing. He has no fever or chills , no flu-like symptoms. on day of admission he had one episode of chest pain  which occurred while laying down after coughing, pressure like, located in the substernal region , centrally located , lasted a few seconds and self resolved. (15 Dec 2019 22:34)      Patient seen and evaluated at bedside. No acute events overnight except as noted.    Interval HPI: no events o/n     PAST MEDICAL & SURGICAL HISTORY:  AICD (automatic cardioverter/defibrillator) present  Diabetes  Stented coronary artery  No significant past surgical history      REVIEW OF SYSTEMS:  as per hpi    VITALS:  Vital Signs Last 24 Hrs  T(C): 36.7 (20 Dec 2019 12:25), Max: 36.7 (19 Dec 2019 22:15)  T(F): 98.1 (20 Dec 2019 12:25), Max: 98.1 (19 Dec 2019 22:15)  HR: 98 (20 Dec 2019 12:25) (91 - 98)  BP: 98/67 (20 Dec 2019 12:25) (98/67 - 118/73)  BP(mean): --  RR: 18 (20 Dec 2019 12:25) (18 - 18)  SpO2: 98% (20 Dec 2019 12:25) (95% - 98%)    PHYSICAL EXAM:  GENERAL: NAD, well-developed  HEAD:  Atraumatic, Normocephalic  EYES: EOMI, PERRLA, conjunctiva and sclera clear  NECK: Supple, No JVD  CHEST/LUNG: Clear to auscultation bilaterally; No wheeze  HEART: S1, S2; No murmurs, rubs, or gallops  ABDOMEN: Soft, Nontender, Nondistended; Bowel sounds present  EXTREMITIES:  2+ Peripheral Pulses, No clubbing, cyanosis, or edema  PSYCH: Normal affect  NEUROLOGY: AAOX3; non-focal  SKIN: No rashes or lesions    Consultant(s) Notes Reviewed:  [x ] YES  [ ] NO  Care Discussed with Consultants/Other Providers [ x] YES  [ ] NO    MEDS:  MEDICATIONS  (STANDING):  aspirin  chewable 81 milliGRAM(s) Oral daily  atorvastatin 40 milliGRAM(s) Oral at bedtime  budesonide 160 MICROgram(s)/formoterol 4.5 MICROgram(s) Inhaler 2 Puff(s) Inhalation two times a day  clopidogrel Tablet 75 milliGRAM(s) Oral daily  dextrose 5%. 1000 milliLiter(s) (50 mL/Hr) IV Continuous <Continuous>  dextrose 50% Injectable 12.5 Gram(s) IV Push once  dextrose 50% Injectable 25 Gram(s) IV Push once  heparin  Injectable 5000 Unit(s) SubCutaneous every 12 hours  insulin glargine Injectable (LANTUS) 68 Unit(s) SubCutaneous at bedtime  insulin lispro (HumaLOG) corrective regimen sliding scale   SubCutaneous three times a day before meals  insulin lispro (HumaLOG) corrective regimen sliding scale   SubCutaneous at bedtime  insulin lispro Injectable (HumaLOG) 23 Unit(s) SubCutaneous three times a day before meals  metoprolol succinate ER 50 milliGRAM(s) Oral daily  nicotine - 21 mG/24Hr(s) Patch 1 patch Transdermal daily  sacubitril 24 mG/valsartan 26 mG 1 Tablet(s) Oral two times a day  spironolactone 25 milliGRAM(s) Oral daily  tamsulosin 0.4 milliGRAM(s) Oral at bedtime  tiotropium 18 MICROgram(s) Capsule 1 Capsule(s) Inhalation daily    MEDICATIONS  (PRN):  acetaminophen   Tablet .. 650 milliGRAM(s) Oral every 4 hours PRN Mild Pain (1 - 3)  albuterol/ipratropium for Nebulization 3 milliLiter(s) Nebulizer every 6 hours PRN Shortness of Breath and/or Wheezing  dextrose 40% Gel 15 Gram(s) Oral once PRN Blood Glucose LESS THAN 70 milliGRAM(s)/deciliter  glucagon  Injectable 1 milliGRAM(s) IntraMuscular once PRN Glucose LESS THAN 70 milligrams/deciliter      ALLERGIES:  No Known Allergies      LABS:                                          12.6   8.40  )-----------( 195      ( 19 Dec 2019 08:10 )             39.0   12-20    136  |  100  |  36<H>  ----------------------------<  160<H>  3.8   |  20<L>  |  1.42<H>    Ca    9.2      20 Dec 2019 05:59  Phos  3.9     12-19  Mg     2.3     12-19    < from: Cardiac Cath Lab - Adult (12.18.19 @ 14:38) >  CORONARY VESSELS: The coronary circulation is right dominant.  LM:  --  LM: Normal.  LAD:   --  Ostial LAD: There was a 100 % stenosis.  CX:   --  Distal circumflex: There was a 80 % stenosis.  RI:   --  Ramus intermedius: Normal. There was no significant restenosis.  RCA:   --  Mid RCA: There was a 50 % stenosis.  COMPLICATIONS: There were no complications.  INTERVENTIONAL RECOMMENDATIONS: DAPT for 1 year    < end of copied text >        < from: TTE with Doppler (w/Cont) (12.17.19 @ 06:40) >  --------------------------------------  Conclusions:  1. Tethered mitral valve leaflets with normal opening.  Mild-moderate mitral regurgitation.  2. Calcified trileaflet aortic valve with normal opening.  No aortic valve regurgitation seen.  3. Eccentric left ventricular hypertrophy (dilated left  ventricle with normal relative wall thickness).  4. Endocardial visualization enhanced with intravenous  injection of Ultrasonic Enhancing Agent (Definity). Severe  global left ventricular systolic dysfunction. No left  ventricular thrombus.  5. The right ventricle is not well visualized; grossly  normal right ventricular systolic function. A device wire  is noted in the right heart.  6. Trace pericardial effusion.  *** Compared with echocardiogram of 2/8/2018, results are  similar on today's study.  ---------------------------------------------    < end of copied text >

## 2019-12-20 NOTE — DISCHARGE NOTE PROVIDER - PROVIDER TOKENS
PROVIDER:[TOKEN:[[id number]],FOLLOWUP:[1 week]],PROVIDER:[TOKEN:[[id number]],FOLLOWUP:[1-3 days]] PROVIDER:[TOKEN:[[id number]],FOLLOWUP:[1 week]],PROVIDER:[TOKEN:[[id number]],FOLLOWUP:[1-3 days]],PROVIDER:[TOKEN:[[id number]],FOLLOWUP:[1-3 days]],PROVIDER:[TOKEN:[[id number]],FOLLOWUP:[1 month]],PROVIDER:[TOKEN:[[id number]],FOLLOWUP:[1 week]]

## 2019-12-20 NOTE — DISCHARGE NOTE PROVIDER - CARE PROVIDERS DIRECT ADDRESSES
,[email or web address],[email or web address]_Unknown,[email or web address],DirectAddress_Unknown,DirectAddress_Unknown

## 2019-12-20 NOTE — PROGRESS NOTE ADULT - ASSESSMENT
Assessment  DMT2: 59y Male with DM T2 with hyperglycemia, A1C 8.9% , was on insulin at home (Lantus 50u at bedtime, Humalog 20u before meals), now on basal bolus insulin, increased dose yesterday, blood sugars improving, no hypoglycemic episode. Patient is eating full meals in addition to meals brought from home, non compliant with low carb diet, ambulating, comfortable.  HF: on medications, no chest pain, stable, monitored.  COPD: On management, stable, FU Pulm.  MANJIT: Monitor labs/BMP  Obesity: No strict exercise routines, not on any weight loss program, neither on low calorie diet.          Cortney Flanagan MD  Cell: [phone]  Office: [phone] Assessment  DMT2: 59y Male with DM T2 with hyperglycemia, A1C 8.9% , was on insulin at home (Lantus 50u at bedtime, Humalog 20u before meals), now on basal bolus insulin, increased dose yesterday, blood sugars improving, no hypoglycemic episode. Patient is eating full meals in  addition to meals brought from home, non compliant with low carb diet, ambulating, comfortable.  HF: on medications, no chest pain, stable, monitored.  COPD: On management, stable, FU Pulm.  MANJIT: Monitor labs/BMP  Obesity: No strict exercise routines, not on any weight loss program, neither on low calorie diet.          Cortney Flanagan MD  Cell: [phone]  Office: [phone]

## 2019-12-20 NOTE — PROGRESS NOTE ADULT - PROBLEM SELECTOR PLAN 5
- tamsulosin
Overweight/Obesity: Patient counseled for weight loss, exercise, low calorie diet.

## 2019-12-20 NOTE — DISCHARGE NOTE PROVIDER - NSDCMRMEDTOKEN_GEN_ALL_CORE_FT
aspirin 81 mg oral tablet, chewable: 1 tab(s) orally once a day  Entresto 24 mg-26 mg oral tablet: 1 tab(s) orally 2 times a day   HumaLOG KwikPen 100 units/mL injectable solution: 20 unit(s) injectable 3 times a day  Lantus 100 units/mL subcutaneous solution: 50 unit(s) subcutaneous once a day (at bedtime)  lisinopril 5 mg oral tablet: 1 tab(s) orally once a day  metoprolol succinate 50 mg oral tablet, extended release: 1 tab(s) orally once a day  nicotine 21 mg-14 mg-7 mg transdermal film, extended release:   Proventil HFA 90 mcg/inh inhalation aerosol: 2 puff(s) inhaled 4 times a day, As Needed   spironolactone 25 mg oral tablet: 1 tab(s) orally once a day  tamsulosin 0.4 mg oral capsule: 1 cap(s) orally once a day  ticagrelor 90 mg oral tablet: 1 tab(s) orally 2 times a day  Trelegy Ellipta inhalation powder: 1 puff(s) inhaled once a day aspirin 81 mg oral tablet, chewable: 1 tab(s) orally once a day  HumaLOG KwikPen 100 units/mL injectable solution: 23 unit(s) injectable 3 times a day  Lantus 100 units/mL subcutaneous solution: 68 unit(s) subcutaneous once a day (at bedtime)  metoprolol succinate 50 mg oral tablet, extended release: 1 tab(s) orally once a day  nicotine 21 mg-14 mg-7 mg transdermal film, extended release:   Proventil HFA 90 mcg/inh inhalation aerosol: 2 puff(s) inhaled 4 times a day, As Needed   tamsulosin 0.4 mg oral capsule: 1 cap(s) orally once a day  Trelegy Ellipta inhalation powder: 1 puff(s) inhaled once a day

## 2019-12-20 NOTE — DISCHARGE NOTE PROVIDER - HOSPITAL COURSE
60 y/o male w/pmh T2DM, HTN, CAD/MI s/p stents (most recent 2/2018), HFrEF,  ischemic cardiomyopathy, extensive smoking history quit about two weeks ago, newly diagnosed COPD and currently on trelegy, presented to ED with worsened dyspnea on exertion over the past two weeks. Patient admitted with Heart Failure exacerbation. Treated with IV Lasix.        CHF exacerbation    acute on chronic systolic    cardiology consulted: Interrogation of [facility] ICD to evaluate for burden of VT showed no sustained episodes. Left heart cath showed disease progression - now status post PCI to LCx.    Patient improved    strict i/o    daily wt    tele monitored    echo noted with decreased EF    acs ruled out    AICD interrogation noted    s/p cath:12/18 [facility]        CORONARY VESSELS: The coronary circulation is right dominant.    LM:   --  LM: Normal.    LAD:   --  Ostial LAD: There was a 100 % stenosis.    CX:   --  Distal circumflex: There was a 80 % stenosis.    RI:   --  Ramus intermedius: Normal. There was no significant restenosis.    RCA:   --  Mid RCA: There was a 50 % stenosis.    COMPLICATIONS: There were no complications.    INTERVENTIONAL RECOMMENDATIONS: DAPT for 1 year        asa/plavix/statin    Lasix stopped and not to be continued on discharge    Patient started on entresto    monitor creat, improved here. To be followed up outpatient.         copd exacerbation    pulm consulted    nebs    inhalers        LUIS ALFREDO    CPAP at night (pt refused)        T2DM    uncontrolled a1c 8.9    endo consulted    Insulin regime adjusted. Follow up with Endocrine in 4 weeks        servando on ckd -improved. follow nup outpatient    renal consulted    avoid nephrotoxins    monitored creatinine             Cleared for discharge home by Dr Marie with follow up with cardiology, pulm, renal, PCP and endocrine.

## 2019-12-20 NOTE — PROGRESS NOTE ADULT - PROBLEM SELECTOR PROBLEM 5
BPH (benign prostatic hyperplasia)
Obesity (BMI 30.0-34.9)

## 2019-12-20 NOTE — PROGRESS NOTE ADULT - PROBLEM SELECTOR PLAN 1
Will continue current insulin regimen, continue monitoring FS and FU.  Suggest to DC home on current inpatient insulin regimen and FU endo 4 weeks.  Patient counseled for compliance with consistent low carb diet and exercise as tolerated outpatient. Patient counseled for compliance with consistent low carb diet and exercise as tolerated outpatient.

## 2019-12-20 NOTE — DISCHARGE NOTE PROVIDER - CARE PROVIDER_API CALL
Monty Looney)  Cardiology; Internal Medicine  [address], [address]  [place] [address]  Phone: [phone]  Fax: [phone]  Follow Up Time: 1 week    Garrison Boykin)  Internal Medicine  [address]  [place] [address]  Phone: [phone]  Fax: [phone]  Follow Up Time: 1-3 days Monty Looney (MD)  Cardiology; Internal Medicine  [address], [address]  [place] [address]  Phone: [phone]  Fax: [phone]  Follow Up Time: 1 week    Garrison Boykin)  Internal Medicine  [address]  [place] [address]  Phone: [phone]  Fax: [phone]  Follow Up Time: 1-3 days    Ferdinand Javier (DO)  Internal Medicine; Pulmonary Disease  [address], [address]  [place] [address]  Phone: [phone]  Fax: [phone]  Follow Up Time: 1-3 days    Cortney Flanagan)  EndocrinologyMetabDiabetes; Internal Medicine  [address] [place] [address]  Phone: [phone]  Fax: [phone]  Follow Up Time: 1 month    Yann Cain (DO)  Nephrology  [address]  [place] [address]  Phone: [phone]  Fax: [phone]  Follow Up Time: 1 week

## 2019-12-20 NOTE — PROGRESS NOTE ADULT - PROBLEM SELECTOR PROBLEM 2
CAD (coronary artery disease)
Acute on chronic heart failure with reduced ejection fraction and diastolic dysfunction

## 2019-12-20 NOTE — DISCHARGE NOTE NURSING/CASE MANAGEMENT/SOCIAL WORK - PATIENT PORTAL LINK FT
You can access the FollowMyHealth Patient Portal offered by [facility] by registering at the following website: [email or web address]. By joining [employer]’s FollowMyHealth portal, you will also be able to view your health information using other applications (apps) compatible with our system.

## 2019-12-20 NOTE — PROGRESS NOTE ADULT - ASSESSMENT
59  M w/pmh T2DM, HTN, CAD/MI s/p stents (most recent 2/2018), HFrEF ( EF 25%) , extensive smoking history quit about two weeks ago, COPD, p/w GREWAL x 2 weeks.   admitted with acute on chronic systolic heart failure and copd exac      CHF exac  acute on chronic systolic  cards following  now improved  strict i/o  daily wt  tele  echo noted with dec EF  acs ruled out  AICD interrogation noted  s/p cath with pci  asa/plavix/statin    CHF  started on entresto  monitor creat     copd exac  pulm consulted  nebs  inhalers    LUIS ALFREDO  CPAP at night (pt refused)    DM  uncontrolled a1c 8.9  endo consulted  insulin as per endo    cad s/p pci  cont asa/plavix/statin/bb       servando on ckd  renal consulted  avoid nephrotoxins  monitor creat  hold lasix   hold Ace i     dc home today on entresto  to f/u with cards next week for bmp check

## 2019-12-20 NOTE — PROGRESS NOTE ADULT - SUBJECTIVE AND OBJECTIVE BOX
Chief complaint  Patient is a 59y old  Male who presents with a chief complaint of SOB x 2 weeks (20 Dec 2019 10:45)   Review of systems  Patient in bed, looks comfortable, no fever, no hypoglycemia.    Labs and Fingersticks  CAPILLARY BLOOD GLUCOSE      POCT Blood Glucose.: 129 mg/dL (20 Dec 2019 10:51)  POCT Blood Glucose.: 168 mg/dL (20 Dec 2019 08:10)  POCT Blood Glucose.: 131 mg/dL (19 Dec 2019 22:18)  POCT Blood Glucose.: 102 mg/dL (19 Dec 2019 16:35)  POCT Blood Glucose.: 306 mg/dL (19 Dec 2019 11:52)      Anion Gap, Serum: 16 (12-20 @ 05:59)  Anion Gap, Serum: 13 (12-19 @ 05:41)      Calcium, Total Serum: 9.2 (12-20 @ 05:59)  Calcium, Total Serum: 9.4 (12-19 @ 05:41)          12-20    136  |  100  |  36<H>  ----------------------------<  160<H>  3.8   |  20<L>  |  1.42<H>    Ca    9.2      20 Dec 2019 05:59  Phos  3.9     12-19  Mg     2.3     12-19                          12.6   8.40  )-----------( 195      ( 19 Dec 2019 08:10 )             39.0     Medications  MEDICATIONS  (STANDING):  aspirin  chewable 81 milliGRAM(s) Oral daily  atorvastatin 40 milliGRAM(s) Oral at bedtime  budesonide 160 MICROgram(s)/formoterol 4.5 MICROgram(s) Inhaler 2 Puff(s) Inhalation two times a day  clopidogrel Tablet 75 milliGRAM(s) Oral daily  dextrose 5%. 1000 milliLiter(s) (50 mL/Hr) IV Continuous <Continuous>  dextrose 50% Injectable 12.5 Gram(s) IV Push once  dextrose 50% Injectable 25 Gram(s) IV Push once  heparin  Injectable 5000 Unit(s) SubCutaneous every 12 hours  insulin glargine Injectable (LANTUS) 68 Unit(s) SubCutaneous at bedtime  insulin lispro (HumaLOG) corrective regimen sliding scale   SubCutaneous three times a day before meals  insulin lispro (HumaLOG) corrective regimen sliding scale   SubCutaneous at bedtime  insulin lispro Injectable (HumaLOG) 23 Unit(s) SubCutaneous three times a day before meals  metoprolol succinate ER 50 milliGRAM(s) Oral daily  nicotine - 21 mG/24Hr(s) Patch 1 patch Transdermal daily  sacubitril 24 mG/valsartan 26 mG 1 Tablet(s) Oral two times a day  spironolactone 25 milliGRAM(s) Oral daily  tamsulosin 0.4 milliGRAM(s) Oral at bedtime  tiotropium 18 MICROgram(s) Capsule 1 Capsule(s) Inhalation daily      Physical Exam  General: Patient comfortable in bed  Vital Signs Last 12 Hrs  T(F): 97.8 (12-20-19 @ 04:07), Max: 97.8 (12-20-19 @ 04:07)  HR: 91 (12-20-19 @ 04:07) (91 - 91)  BP: 102/62 (12-20-19 @ 04:07) (102/62 - 102/62)  BP(mean): --  RR: 18 (12-20-19 @ 04:07) (18 - 18)  SpO2: 96% (12-20-19 @ 04:07) (96% - 96%)  Neck: No palpable thyroid nodules.  CVS: S1S2, No murmurs  Respiratory: No wheezing, no crepitations  GI: Abdomen soft, bowel sounds positive  Musculoskeletal:  edema lower extremities.   Skin: No skin rashes, no ecchymosis    Diagnostics Chief complaint  Patient is a 59y old  Male who presents with a chief complaint of SOB x 2 weeks (20 Dec 2019 10:45)   Review of systems  Patient in bed, looks comfortable, no fever,  no hypoglycemia.    Labs and Fingersticks  CAPILLARY BLOOD GLUCOSE      POCT Blood Glucose.: 129 mg/dL (20 Dec 2019 10:51)  POCT Blood Glucose.: 168 mg/dL (20 Dec 2019 08:10)  POCT Blood Glucose.: 131 mg/dL (19 Dec 2019 22:18)  POCT Blood Glucose.: 102 mg/dL (19 Dec 2019 16:35)  POCT Blood Glucose.: 306 mg/dL (19 Dec 2019 11:52)      Anion Gap, Serum: 16 (12-20 @ 05:59)  Anion Gap, Serum: 13 (12-19 @ 05:41)      Calcium, Total Serum: 9.2 (12-20 @ 05:59)  Calcium, Total Serum: 9.4 (12-19 @ 05:41)          12-20    136  |  100  |  36<H>  ----------------------------<  160<H>  3.8   |  20<L>  |  1.42<H>    Ca    9.2      20 Dec 2019 05:59  Phos  3.9     12-19  Mg     2.3     12-19                          12.6   8.40  )-----------( 195      ( 19 Dec 2019 08:10 )             39.0     Medications  MEDICATIONS  (STANDING):  aspirin  chewable 81 milliGRAM(s) Oral daily  atorvastatin 40 milliGRAM(s) Oral at bedtime  budesonide 160 MICROgram(s)/formoterol 4.5 MICROgram(s) Inhaler 2 Puff(s) Inhalation two times a day  clopidogrel Tablet 75 milliGRAM(s) Oral daily  dextrose 5%. 1000 milliLiter(s) (50 mL/Hr) IV Continuous <Continuous>  dextrose 50% Injectable 12.5 Gram(s) IV Push once  dextrose 50% Injectable 25 Gram(s) IV Push once  heparin  Injectable 5000 Unit(s) SubCutaneous every 12 hours  insulin glargine Injectable (LANTUS) 68 Unit(s) SubCutaneous at bedtime  insulin lispro (HumaLOG) corrective regimen sliding scale   SubCutaneous three times a day before meals  insulin lispro (HumaLOG) corrective regimen sliding scale   SubCutaneous at bedtime  insulin lispro Injectable (HumaLOG) 23 Unit(s) SubCutaneous three times a day before meals  metoprolol succinate ER 50 milliGRAM(s) Oral daily  nicotine - 21 mG/24Hr(s) Patch 1 patch Transdermal daily  sacubitril 24 mG/valsartan 26 mG 1 Tablet(s) Oral two times a day  spironolactone 25 milliGRAM(s) Oral daily  tamsulosin 0.4 milliGRAM(s) Oral at bedtime  tiotropium 18 MICROgram(s) Capsule 1 Capsule(s) Inhalation daily      Physical Exam  General: Patient comfortable in bed  Vital Signs Last 12 Hrs  T(F): 97.8 (12-20-19 @ 04:07), Max: 97.8 (12-20-19 @ 04:07)  HR: 91 (12-20-19 @ 04:07) (91 - 91)  BP: 102/62 (12-20-19 @ 04:07) (102/62 - 102/62)  BP(mean): --  RR: 18 (12-20-19 @ 04:07) (18 - 18)  SpO2: 96% (12-20-19 @ 04:07) (96% - 96%)  Neck: No palpable thyroid nodules.  CVS: S1S2, No murmurs  Respiratory: No wheezing, no crepitations  GI: Abdomen soft, bowel sounds positive  Musculoskeletal:  edema lower extremities.   Skin: No skin rashes, no ecchymosis    Diagnostics

## 2019-12-20 NOTE — DISCHARGE NOTE PROVIDER - NSDCFUSCHEDAPPT_GEN_ALL_CORE_FT
SEVERIANO MARTINEZ ; 01/03/2020 ; [facility] [address] SEVERIANO MARTINEZ ; 01/03/2020 ; [facility] [address] SEVERIANO MARTINEZ ; 01/03/2020 ; [facility] [id number] DC instructions

## 2019-12-20 NOTE — PROGRESS NOTE ADULT - SUBJECTIVE AND OBJECTIVE BOX
PULMONARY PROGRESS NOTE    SEVERIANO MARTINEZ  MRN-[id number]    Patient is a 59y old  Male who presents with a chief complaint of SOB x 2 weeks (18 Dec 2019 11:00)      HPI:  -Patient well known to , LUIS ALFREDO and COPD.  On trelegy daily at home.  s/p cath with stent.  Feeling better.  eager to go home.    ROS:   -no chest pain    MEDICATIONS  (STANDING):  aspirin  chewable 81 milliGRAM(s) Oral daily  atorvastatin 40 milliGRAM(s) Oral at bedtime  budesonide 160 MICROgram(s)/formoterol 4.5 MICROgram(s) Inhaler 2 Puff(s) Inhalation two times a day  clopidogrel Tablet 75 milliGRAM(s) Oral daily  dextrose 5%. 1000 milliLiter(s) (50 mL/Hr) IV Continuous <Continuous>  dextrose 50% Injectable 12.5 Gram(s) IV Push once  dextrose 50% Injectable 25 Gram(s) IV Push once  heparin  Injectable 5000 Unit(s) SubCutaneous every 12 hours  insulin glargine Injectable (LANTUS) 68 Unit(s) SubCutaneous at bedtime  insulin lispro (HumaLOG) corrective regimen sliding scale   SubCutaneous three times a day before meals  insulin lispro (HumaLOG) corrective regimen sliding scale   SubCutaneous at bedtime  insulin lispro Injectable (HumaLOG) 23 Unit(s) SubCutaneous three times a day before meals  metoprolol succinate ER 50 milliGRAM(s) Oral daily  nicotine - 21 mG/24Hr(s) Patch 1 patch Transdermal daily  sacubitril 24 mG/valsartan 26 mG 1 Tablet(s) Oral two times a day  spironolactone 25 milliGRAM(s) Oral daily  tamsulosin 0.4 milliGRAM(s) Oral at bedtime  tiotropium 18 MICROgram(s) Capsule 1 Capsule(s) Inhalation daily    MEDICATIONS  (PRN):  acetaminophen   Tablet .. 650 milliGRAM(s) Oral every 4 hours PRN Mild Pain (1 - 3)  albuterol/ipratropium for Nebulization 3 milliLiter(s) Nebulizer every 6 hours PRN Shortness of Breath and/or Wheezing  dextrose 40% Gel 15 Gram(s) Oral once PRN Blood Glucose LESS THAN 70 milliGRAM(s)/deciliter  glucagon  Injectable 1 milliGRAM(s) IntraMuscular once PRN Glucose LESS THAN 70 milligrams/deciliter        EXAM:  Vital Signs Last 24 Hrs  T(C): 36.6 (20 Dec 2019 04:07), Max: 36.7 (19 Dec 2019 22:15)  T(F): 97.8 (20 Dec 2019 04:07), Max: 98.1 (19 Dec 2019 22:15)  HR: 91 (20 Dec 2019 04:07) (91 - 97)  BP: 102/62 (20 Dec 2019 04:07) (102/62 - 118/73)  BP(mean): --  RR: 18 (20 Dec 2019 04:07) (18 - 18)  SpO2: 96% (20 Dec 2019 04:07) (95% - 96%)    GENERAL: The patient is awake and alert in no apparent distress.     LUNGS: Clear to auscultation without wheezing, rales or rhonchi; respirations unlabored    HEART: S1/S2    LABS/IMAGING: reviewed                                   12.6   8.40  )-----------( 195      ( 19 Dec 2019 08:10 )             39.0   12-20    136  |  100  |  36<H>  ----------------------------<  160<H>  3.8   |  20<L>  |  1.42<H>    Ca    9.2      20 Dec 2019 05:59  Phos  3.9     12-19  Mg     2.3     12-19        < from: CT Angio Chest w/ IV Cont (12.15.19 @ 19:55) >  IMPRESSION:   1.  No pulmonary embolism.  2.  No gross CT evidence of pneumonia.  3.  Cardiomegaly with prominent left heart enlargement.  Interstitial  pulmonary edema.  Small pleural effusions bilaterally.  Correlate   clinically for congestive heart failure.  4.  Diffuse hepatic steatosis.    < end of copied text >      PROBLEM LIST:  59y Male with HEALTH ISSUES - PROBLEM Dx:  Obesity (BMI 30.0-34.9): Obesity (BMI 30.0-34.9)  MANJIT (acute kidney injury): MANJIT (acute kidney injury)  BPH (benign prostatic hyperplasia): BPH (benign prostatic hyperplasia)  Diabetes: Diabetes  COPD (chronic obstructive pulmonary disease): COPD (chronic obstructive pulmonary disease)  CAD (coronary artery disease): CAD (coronary artery disease)  Acute on chronic heart failure with reduced ejection fraction and diastolic dysfunction: Acute on chronic heart failure with reduced ejection fraction and diastolic dysfunction      RECS:  -Cont symbicort/spiriva/duonebs. upon discharge will resume trelegy inhaler once daily--discussed with pt  -CPAP [id number] qhs for luis alfredo  -appreciate cardiac care  -no pulm objection to dc, will need close outpt follow up with , CPAP being ordered already from our office for LUIS ALFREDO.      Shakira Lopez MD   [phone]

## 2019-12-20 NOTE — PROGRESS NOTE ADULT - PROBLEM SELECTOR PROBLEM 4
[FreeTextEntry1] : Chart reviewed. Blood drawn as per physician request/order.\par 
Diabetes
MANJIT (acute kidney injury)

## 2019-12-20 NOTE — PROGRESS NOTE ADULT - ATTENDING COMMENTS
Will continue current insulin regimen, continue monitoring FS and FU.  Suggest to DC home on current inpatient insulin regimen and FU endo 4 weeks.

## 2019-12-20 NOTE — PROGRESS NOTE ADULT - REASON FOR ADMISSION
SOB x 2 weeks

## 2019-12-26 ENCOUNTER — APPOINTMENT (OUTPATIENT)
Dept: CARDIOLOGY | Facility: CLINIC | Age: 59
End: 2019-12-26
Payer: COMMERCIAL

## 2019-12-26 ENCOUNTER — NON-APPOINTMENT (OUTPATIENT)
Age: 59
End: 2019-12-26

## 2019-12-26 VITALS — HEART RATE: 92 BPM | OXYGEN SATURATION: 98 %

## 2019-12-26 VITALS
BODY MASS INDEX: 32.37 KG/M2 | WEIGHT: 273 LBS | HEART RATE: 103 BPM | SYSTOLIC BLOOD PRESSURE: 113 MMHG | DIASTOLIC BLOOD PRESSURE: 72 MMHG | OXYGEN SATURATION: 97 %

## 2019-12-26 PROCEDURE — 93000 ELECTROCARDIOGRAM COMPLETE: CPT

## 2019-12-26 PROCEDURE — 99215 OFFICE O/P EST HI 40 MIN: CPT

## 2019-12-26 RX ORDER — CLOPIDOGREL BISULFATE 75 MG/1
75 TABLET, FILM COATED ORAL
Qty: 90 | Refills: 3 | Status: ACTIVE | COMMUNITY
Start: 2019-12-26

## 2019-12-26 RX ORDER — TICAGRELOR 90 MG/1
90 TABLET ORAL
Qty: 60 | Refills: 0 | Status: DISCONTINUED | COMMUNITY
Start: 2018-02-07 | End: 2019-12-26

## 2019-12-26 RX ORDER — LISINOPRIL 5 MG/1
5 TABLET ORAL
Qty: 30 | Refills: 0 | Status: DISCONTINUED | COMMUNITY
Start: 2018-02-08 | End: 2019-12-26

## 2019-12-26 NOTE — PHYSICAL EXAM
[General Appearance - Well Developed] : well developed [Well Groomed] : well groomed [Normal Appearance] : normal appearance [General Appearance - In No Acute Distress] : no acute distress [No Deformities] : no deformities [Eyelids - No Xanthelasma] : the eyelids demonstrated no xanthelasmas [Normal Conjunctiva] : the conjunctiva exhibited no abnormalities [Normal Oral Mucosa] : normal oral mucosa [No Oral Pallor] : no oral pallor [No Oral Cyanosis] : no oral cyanosis [Normal Jugular Venous A Waves Present] : normal jugular venous A waves present [No Jugular Venous Bloom A Waves] : no jugular venous bloom A waves [Normal Jugular Venous V Waves Present] : normal jugular venous V waves present [Respiration, Rhythm And Depth] : normal respiratory rhythm and effort [Exaggerated Use Of Accessory Muscles For Inspiration] : no accessory muscle use [Auscultation Breath Sounds / Voice Sounds] : lungs were clear to auscultation bilaterally [Heart Rate And Rhythm] : heart rate and rhythm were normal [Heart Sounds] : normal S1 and S2 [Murmurs] : no murmurs present [Abdomen Soft] : soft [Abdomen Tenderness] : non-tender [Abdomen Mass (___ Cm)] : no abdominal mass palpated [Abnormal Walk] : normal gait [Gait - Sufficient For Exercise Testing] : the gait was sufficient for exercise testing [Cyanosis, Localized] : no localized cyanosis [Nail Clubbing] : no clubbing of the fingernails [Petechial Hemorrhages (___cm)] : no petechial hemorrhages [] : no rash [Skin Color & Pigmentation] : normal skin color and pigmentation [Skin Lesions] : no skin lesions [No Venous Stasis] : no venous stasis [No Xanthoma] : no  xanthoma was observed [No Skin Ulcers] : no skin ulcer [Oriented To Time, Place, And Person] : oriented to person, place, and time [Mood] : the mood was normal [Affect] : the affect was normal [No Anxiety] : not feeling anxious [FreeTextEntry1] : obese

## 2019-12-26 NOTE — REASON FOR VISIT
[FreeTextEntry1] : The patient comes in for followup of his coronary artery disease, hypertension, chronic systolic heart failure, diabetes, hypercholesterolemia and he gets occasional chest discomforts that sometimes are relieved with burping. He gets dyspnea on exertion. He had a nuclear stress test which showed scar but no ischemia. This is the 4th  office visit since the patient had an anterior wall STEMI  on February 5, 2018. He went to the [facility] emergency room. He underwent emergent cardiac catheterization and had a stent placed and a large ramus artery. He was discharged 3 days later. He has not had chest pain since the hospital. \par He currently has an upper respiratory infection. He is trying to quit smoking. He feels short of breath when he lies down to sleep. He wakes up frequently at night did not want anything for sleep apnea. When he wakes up he goes and eats. He had some chest tingling but felt completely different than his heart attack discomfort.\par June 6, 2019: Patient has no new complaints. He denies any chest pains, shortness of breath, or palpitations. He is smoking to half and one pack per day. He may be moving to [place]. He intends to come up here periodically.\par October 10, 2019: The patient complains when he is lying on his right side that he gets short of breath.  He also sometimes gets short of breath when lying on his back.  He is quit smoking gone back many times.  He cannot seem to quit permanently.  He denies any chest pains or palpitations.  He has never been to a pulmonologist even though his primary doctor had recommended it years ago.  He takes pro-air inhaler inhaler very irregularly and only as needed.\par December 26, 2019: The patient was admitted approximately 2 weeks ago to [facility] with shortness of breath. He underwent cardiac catheterization on December 18, 2019. It showed progression of his circumflex disease with a low ejection fraction and 100% occlusion of his LAD. He underwent stenting of his circumflex disease. He was placed on Entresto, and  sent home. He now comes for followup.

## 2019-12-26 NOTE — DISCUSSION/SUMMARY
[FreeTextEntry1] : The patient was examined. His blood pressure was 113/72. His pulse was 92. His lungs were clear to auscultation. Cardiac exam was  negative for murmurs rubs or gallops.His EKG showed normal sinus rhythm with an anteroseptal wall myocardial infarction and nonspecific ST and T wave changes. No acute changes were seen.The patient will remain on his current medication, but we will add furosemide 20 mg QOD. He'll return in 1 month, or earlier if needed.

## 2019-12-26 NOTE — REVIEW OF SYSTEMS
[see HPI] : see HPI [Shortness Of Breath] : shortness of breath [Dyspnea on exertion] : dyspnea during exertion [Negative] : Heme/Lymph [Lower Ext Edema] : lower extremity edema [Chest Pain] : no chest pain [Palpitations] : no palpitations

## 2019-12-27 LAB
ALBUMIN SERPL ELPH-MCNC: 4 G/DL
ALP BLD-CCNC: 89 U/L
ALT SERPL-CCNC: 49 U/L
ANION GAP SERPL CALC-SCNC: 14 MMOL/L
AST SERPL-CCNC: 21 U/L
BASOPHILS # BLD AUTO: 0.07 K/UL
BASOPHILS NFR BLD AUTO: 0.9 %
BILIRUB SERPL-MCNC: 0.3 MG/DL
BUN SERPL-MCNC: 31 MG/DL
CALCIUM SERPL-MCNC: 9.1 MG/DL
CHLORIDE SERPL-SCNC: 107 MMOL/L
CHOLEST SERPL-MCNC: 125 MG/DL
CHOLEST/HDLC SERPL: 3.4 RATIO
CO2 SERPL-SCNC: 20 MMOL/L
CREAT SERPL-MCNC: 1.6 MG/DL
EOSINOPHIL # BLD AUTO: 0.41 K/UL
EOSINOPHIL NFR BLD AUTO: 5.4 %
ESTIMATED AVERAGE GLUCOSE: 203 MG/DL
GLUCOSE SERPL-MCNC: 238 MG/DL
HBA1C MFR BLD HPLC: 8.7 %
HCT VFR BLD CALC: 39.4 %
HDLC SERPL-MCNC: 37 MG/DL
HGB BLD-MCNC: 11.8 G/DL
IMM GRANULOCYTES NFR BLD AUTO: 1.7 %
LDLC SERPL CALC-MCNC: 55 MG/DL
LYMPHOCYTES # BLD AUTO: 1.3 K/UL
LYMPHOCYTES NFR BLD AUTO: 17.2 %
MAN DIFF?: NORMAL
MCHC RBC-ENTMCNC: 25.7 PG
MCHC RBC-ENTMCNC: 29.9 GM/DL
MCV RBC AUTO: 85.8 FL
MONOCYTES # BLD AUTO: 0.53 K/UL
MONOCYTES NFR BLD AUTO: 7 %
NEUTROPHILS # BLD AUTO: 5.1 K/UL
NEUTROPHILS NFR BLD AUTO: 67.8 %
PLATELET # BLD AUTO: 181 K/UL
POTASSIUM SERPL-SCNC: 4.7 MMOL/L
PROT SERPL-MCNC: 6.6 G/DL
RBC # BLD: 4.59 M/UL
RBC # FLD: 16 %
SODIUM SERPL-SCNC: 141 MMOL/L
T4 SERPL-MCNC: 5.2 UG/DL
TRIGL SERPL-MCNC: 164 MG/DL
TSH SERPL-ACNC: 6.12 UIU/ML
WBC # FLD AUTO: 7.54 K/UL

## 2019-12-30 NOTE — CONSULT NOTE ADULT - CONSULT REASON
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

## 2020-01-03 ENCOUNTER — APPOINTMENT (OUTPATIENT)
Dept: PULMONOLOGY | Facility: CLINIC | Age: 60
End: 2020-01-03

## 2020-01-07 ENCOUNTER — APPOINTMENT (OUTPATIENT)
Dept: PULMONOLOGY | Facility: CLINIC | Age: 60
End: 2020-01-07

## 2020-01-23 ENCOUNTER — APPOINTMENT (OUTPATIENT)
Dept: CARDIOLOGY | Facility: CLINIC | Age: 60
End: 2020-01-23

## 2020-01-23 DIAGNOSIS — F17.200 NICOTINE DEPENDENCE, UNSPECIFIED, UNCOMPLICATED: ICD-10-CM

## 2020-01-23 DIAGNOSIS — Z12.2 ENCOUNTER FOR SCREENING FOR MALIGNANT NEOPLASM OF RESPIRATORY ORGANS: ICD-10-CM

## 2020-01-23 RX ORDER — METOPROLOL TARTRATE 50 MG/1
50 TABLET, FILM COATED ORAL
Qty: 30 | Refills: 0 | Status: DISCONTINUED | COMMUNITY
Start: 2019-02-04 | End: 2020-01-23

## 2020-01-29 ENCOUNTER — NON-APPOINTMENT (OUTPATIENT)
Age: 60
End: 2020-01-29

## 2020-01-29 ENCOUNTER — APPOINTMENT (OUTPATIENT)
Dept: CARDIOLOGY | Facility: CLINIC | Age: 60
End: 2020-01-29
Payer: COMMERCIAL

## 2020-01-29 VITALS
OXYGEN SATURATION: 98 % | DIASTOLIC BLOOD PRESSURE: 80 MMHG | WEIGHT: 265 LBS | TEMPERATURE: 97.9 F | HEIGHT: 77 IN | HEART RATE: 100 BPM | BODY MASS INDEX: 31.29 KG/M2 | RESPIRATION RATE: 17 BRPM | SYSTOLIC BLOOD PRESSURE: 120 MMHG

## 2020-01-29 PROCEDURE — 99215 OFFICE O/P EST HI 40 MIN: CPT

## 2020-01-29 PROCEDURE — 93000 ELECTROCARDIOGRAM COMPLETE: CPT

## 2020-01-29 NOTE — REASON FOR VISIT
[FreeTextEntry1] : The patient comes in for followup of his coronary artery disease, hypertension, chronic systolic heart failure, diabetes, hypercholesterolemia and he gets occasional chest discomforts that sometimes are relieved with burping. He gets dyspnea on exertion. He had a nuclear stress test which showed scar but no ischemia. This is the 4th  office visit since the patient had an anterior wall STEMI  on February 5, 2018. He went to the [facility] emergency room. He underwent emergent cardiac catheterization and had a stent placed and a large ramus artery. He was discharged 3 days later. He has not had chest pain since the hospital. \par He currently has an upper respiratory infection. He is trying to quit smoking. He feels short of breath when he lies down to sleep. He wakes up frequently at night did not want anything for sleep apnea. When he wakes up he goes and eats. He had some chest tingling but felt completely different than his heart attack discomfort.\par June 6, 2019: Patient has no new complaints. He denies any chest pains, shortness of breath, or palpitations. He is smoking to half and one pack per day. He may be moving to [place]. He intends to come up here periodically.\par October 10, 2019: The patient complains when he is lying on his right side that he gets short of breath.  He also sometimes gets short of breath when lying on his back.  He is quit smoking gone back many times.  He cannot seem to quit permanently.  He denies any chest pains or palpitations.  He has never been to a pulmonologist even though his primary doctor had recommended it years ago.  He takes pro-air inhaler inhaler very irregularly and only as needed.\par December 26, 2019: The patient was admitted approximately 2 weeks ago to [facility] with shortness of breath. He underwent cardiac catheterization on December 18, 2019. It showed progression of his circumflex disease with a low ejection fraction and 100% occlusion of his LAD. He underwent stenting of his circumflex disease. He was placed on Entresto, and  sent home. He now comes for followup.\par January 29, 2020: Patient's wife states that he coughs all night long and cannot lie down.  He had an episode of the left leg swelling and his internist sent him for venous Dopplers which were negative for DVT.  He has quit smoking for a month.  He denies any chest pains.  He was diagnosed as having obstructive sleep apnea in the hospital but he cannot tolerate the CPAP machine.  He is currently taking metoprolol 50 mg.  His pulse rates hovered around 100 bpm.  He is on Entresto but his blood pressure is 120/80.

## 2020-01-29 NOTE — PHYSICAL EXAM
[General Appearance - Well Developed] : well developed [Normal Appearance] : normal appearance [Well Groomed] : well groomed [No Deformities] : no deformities [General Appearance - In No Acute Distress] : no acute distress [Normal Conjunctiva] : the conjunctiva exhibited no abnormalities [Eyelids - No Xanthelasma] : the eyelids demonstrated no xanthelasmas [Normal Oral Mucosa] : normal oral mucosa [No Oral Pallor] : no oral pallor [No Oral Cyanosis] : no oral cyanosis [Normal Jugular Venous A Waves Present] : normal jugular venous A waves present [Normal Jugular Venous V Waves Present] : normal jugular venous V waves present [No Jugular Venous Bloom A Waves] : no jugular venous bloom A waves [Respiration, Rhythm And Depth] : normal respiratory rhythm and effort [Exaggerated Use Of Accessory Muscles For Inspiration] : no accessory muscle use [Auscultation Breath Sounds / Voice Sounds] : lungs were clear to auscultation bilaterally [Heart Rate And Rhythm] : heart rate and rhythm were normal [Murmurs] : no murmurs present [Heart Sounds] : normal S1 and S2 [Abdomen Soft] : soft [Abdomen Tenderness] : non-tender [Abdomen Mass (___ Cm)] : no abdominal mass palpated [Gait - Sufficient For Exercise Testing] : the gait was sufficient for exercise testing [Abnormal Walk] : normal gait [Nail Clubbing] : no clubbing of the fingernails [Cyanosis, Localized] : no localized cyanosis [Petechial Hemorrhages (___cm)] : no petechial hemorrhages [Skin Color & Pigmentation] : normal skin color and pigmentation [] : no rash [Skin Lesions] : no skin lesions [No Venous Stasis] : no venous stasis [No Skin Ulcers] : no skin ulcer [No Xanthoma] : no  xanthoma was observed [Oriented To Time, Place, And Person] : oriented to person, place, and time [Affect] : the affect was normal [Mood] : the mood was normal [No Anxiety] : not feeling anxious [FreeTextEntry1] : obese

## 2020-01-29 NOTE — DISCUSSION/SUMMARY
[FreeTextEntry1] : The patient was examined. His blood pressure was 120/80. His pulse was 99. His lungs were clear to auscultation. Cardiac exam was  negative for murmurs rubs or gallops.His EKG showed  sinus tachycardia with an anteroseptal wall myocardial infarction and nonspecific ST and T wave changes. No acute changes were seen.The patient will remain on his current medication, but we will increase his metoprolol up to 75 mg daily to further slow the heart rate.  It was suggested that he get a humidifier in his bedroom during the winter months.  It was suggested that he try Flonase nasal spray because of his cough and his postnasal drip.  He'll return in 1 month, or earlier if needed.  Lastly, it was suggested that he return to cardiac rehab.

## 2020-01-29 NOTE — REVIEW OF SYSTEMS
[Dyspnea on exertion] : dyspnea during exertion [Shortness Of Breath] : shortness of breath [Lower Ext Edema] : lower extremity edema [Negative] : Heme/Lymph [see HPI] : see HPI [Cough] : cough [Chest Pain] : no chest pain [Palpitations] : no palpitations

## 2020-02-13 NOTE — ED PROVIDER NOTE - BREATH SOUNDS
Left VM to call [facility] back. We received her records, she should be seen at [facility]. DX-anemia. Please cancel patient appointment and forward call to [facility] to schedule.   hemodynamically mediated in setting of decompensated heart failure +/- urinary retention component and hypotension.  Pt with improved SCr to 1.69 today (from 2.3 yesterday). Pt with  good urine output(1530).   Monitor BMP  Strict I/O, avoid nephrotoxics, NSAIDs, RCA. Renal dose of medications. DIMINISHED

## 2020-02-27 ENCOUNTER — NON-APPOINTMENT (OUTPATIENT)
Age: 60
End: 2020-02-27

## 2020-02-27 ENCOUNTER — APPOINTMENT (OUTPATIENT)
Dept: CARDIOLOGY | Facility: CLINIC | Age: 60
End: 2020-02-27
Payer: COMMERCIAL

## 2020-02-27 VITALS
WEIGHT: 265 LBS | HEIGHT: 77 IN | OXYGEN SATURATION: 95 % | HEART RATE: 106 BPM | SYSTOLIC BLOOD PRESSURE: 114 MMHG | BODY MASS INDEX: 31.29 KG/M2 | DIASTOLIC BLOOD PRESSURE: 78 MMHG

## 2020-02-27 PROCEDURE — 93000 ELECTROCARDIOGRAM COMPLETE: CPT | Mod: 59

## 2020-02-27 PROCEDURE — 99214 OFFICE O/P EST MOD 30 MIN: CPT

## 2020-02-27 PROCEDURE — 93289 INTERROG DEVICE EVAL HEART: CPT

## 2020-02-27 RX ORDER — METOPROLOL SUCCINATE 25 MG/1
25 TABLET, EXTENDED RELEASE ORAL
Qty: 30 | Refills: 0 | Status: DISCONTINUED | COMMUNITY
Start: 2018-02-08 | End: 2020-02-27

## 2020-02-27 NOTE — REVIEW OF SYSTEMS
[Dyspnea on exertion] : dyspnea during exertion [Lower Ext Edema] : lower extremity edema [see HPI] : see HPI [Negative] : Heme/Lymph [Shortness Of Breath] : no shortness of breath [Chest Pain] : no chest pain [Palpitations] : no palpitations [Cough] : no cough

## 2020-02-27 NOTE — REASON FOR VISIT
Ambulatory
[FreeTextEntry1] : The patient comes in for followup of his coronary artery disease, hypertension, chronic systolic heart failure, diabetes, hypercholesterolemia and he gets occasional chest discomforts that sometimes are relieved with burping. He gets dyspnea on exertion. He had a nuclear stress test which showed scar but no ischemia. This is the 4th  office visit since the patient had an anterior wall STEMI  on February 5, 2018. He went to the [facility] emergency room. He underwent emergent cardiac catheterization and had a stent placed and a large ramus artery. He was discharged 3 days later. He has not had chest pain since the hospital. \par He currently has an upper respiratory infection. He is trying to quit smoking. He feels short of breath when he lies down to sleep. He wakes up frequently at night did not want anything for sleep apnea. When he wakes up he goes and eats. He had some chest tingling but felt completely different than his heart attack discomfort.\par June 6, 2019: Patient has no new complaints. He denies any chest pains, shortness of breath, or palpitations. He is smoking to half and one pack per day. He may be moving to [place]. He intends to come up here periodically.\par October 10, 2019: The patient complains when he is lying on his right side that he gets short of breath.  He also sometimes gets short of breath when lying on his back.  He is quit smoking gone back many times.  He cannot seem to quit permanently.  He denies any chest pains or palpitations.  He has never been to a pulmonologist even though his primary doctor had recommended it years ago.  He takes pro-air inhaler inhaler very irregularly and only as needed.\par December 26, 2019: The patient was admitted approximately 2 weeks ago to [facility] with shortness of breath. He underwent cardiac catheterization on December 18, 2019. It showed progression of his circumflex disease with a low ejection fraction and 100% occlusion of his LAD. He underwent stenting of his circumflex disease. He was placed on Entresto, and  sent home. He now comes for followup.\par January 29, 2020: Patient's wife states that he coughs all night long and cannot lie down.  He had an episode of the left leg swelling and his internist sent him for venous Dopplers which were negative for DVT.  He has quit smoking for a month.  He denies any chest pains.  He was diagnosed as having obstructive sleep apnea in the hospital but he cannot tolerate the CPAP machine.  He is currently taking metoprolol 50 mg.  His pulse rates hovered around 100 bpm.  He is on Entresto but his blood pressure is 120/80.\par February 27, 2020: Recently the patient stopped his Trelegy and his coughing jags at night are much improved.  He was placed on antibiotic for possible bronchitis.  His heart rates have still been above 100 and he 1 day he tried taking metoprolol 100 mg instead of 75 and it came down to about 92.  Otherwise he has no new symptoms and he is feeling fairly good.

## 2020-02-27 NOTE — PHYSICAL EXAM
[General Appearance - Well Developed] : well developed [Normal Appearance] : normal appearance [Well Groomed] : well groomed [General Appearance - In No Acute Distress] : no acute distress [No Deformities] : no deformities [Normal Conjunctiva] : the conjunctiva exhibited no abnormalities [Eyelids - No Xanthelasma] : the eyelids demonstrated no xanthelasmas [Normal Oral Mucosa] : normal oral mucosa [No Oral Pallor] : no oral pallor [Normal Jugular Venous A Waves Present] : normal jugular venous A waves present [No Oral Cyanosis] : no oral cyanosis [Normal Jugular Venous V Waves Present] : normal jugular venous V waves present [No Jugular Venous Bloom A Waves] : no jugular venous bloom A waves [Auscultation Breath Sounds / Voice Sounds] : lungs were clear to auscultation bilaterally [Respiration, Rhythm And Depth] : normal respiratory rhythm and effort [Exaggerated Use Of Accessory Muscles For Inspiration] : no accessory muscle use [Heart Rate And Rhythm] : heart rate and rhythm were normal [Heart Sounds] : normal S1 and S2 [Murmurs] : no murmurs present [Abdomen Tenderness] : non-tender [Abdomen Soft] : soft [Abdomen Mass (___ Cm)] : no abdominal mass palpated [Gait - Sufficient For Exercise Testing] : the gait was sufficient for exercise testing [Abnormal Walk] : normal gait [Nail Clubbing] : no clubbing of the fingernails [Cyanosis, Localized] : no localized cyanosis [Petechial Hemorrhages (___cm)] : no petechial hemorrhages [Skin Color & Pigmentation] : normal skin color and pigmentation [] : no rash [No Venous Stasis] : no venous stasis [Skin Lesions] : no skin lesions [No Skin Ulcers] : no skin ulcer [No Xanthoma] : no  xanthoma was observed [Oriented To Time, Place, And Person] : oriented to person, place, and time [Affect] : the affect was normal [Mood] : the mood was normal [No Anxiety] : not feeling anxious [FreeTextEntry1] : obese

## 2020-02-27 NOTE — DISCUSSION/SUMMARY
[FreeTextEntry1] : The patient was examined. His blood pressure was 114/78. His pulse was 106 His lungs were clear to auscultation. Cardiac exam was  negative for murmurs rubs or gallops.His EKG showed  sinus tachycardia with an anteroseptal wall myocardial infarction and nonspecific ST and T wave changes. No acute changes were seen.The patient will remain on his current medication, but we will increase his metoprolol up to 100 mg daily to further slow the heart rate.  It was suggested that he get a humidifier in his bedroom during the winter months.  It was suggested that he try Flonase nasal spray because of his cough and his postnasal drip.  He'll return in 2 months, or earlier if needed.  Lastly, it was suggested that he return to cardiac rehab.

## 2020-03-04 NOTE — ED PROVIDER NOTE - CPE EDP GASTRO NORM
normal... Graft Donor Site Bandage (Optional-Leave Blank If You Don't Want In Note): Steri-strips and a pressure bandage were applied to the donor site.

## 2020-05-04 ENCOUNTER — APPOINTMENT (OUTPATIENT)
Dept: CARDIOLOGY | Facility: CLINIC | Age: 60
End: 2020-05-04

## 2020-05-27 ENCOUNTER — NON-APPOINTMENT (OUTPATIENT)
Age: 60
End: 2020-05-27

## 2020-05-27 ENCOUNTER — APPOINTMENT (OUTPATIENT)
Dept: CARDIOLOGY | Facility: CLINIC | Age: 60
End: 2020-05-27
Payer: MEDICARE

## 2020-05-27 VITALS
OXYGEN SATURATION: 98 % | TEMPERATURE: 96.7 F | HEIGHT: 77 IN | BODY MASS INDEX: 32.82 KG/M2 | HEART RATE: 91 BPM | DIASTOLIC BLOOD PRESSURE: 76 MMHG | WEIGHT: 278 LBS | SYSTOLIC BLOOD PRESSURE: 110 MMHG

## 2020-05-27 PROCEDURE — 93289 INTERROG DEVICE EVAL HEART: CPT

## 2020-05-27 PROCEDURE — 99214 OFFICE O/P EST MOD 30 MIN: CPT

## 2020-05-27 NOTE — REASON FOR VISIT
[FreeTextEntry1] : The patient comes in for followup of his coronary artery disease, hypertension, chronic systolic heart failure, diabetes, hypercholesterolemia and he gets occasional chest discomforts that sometimes are relieved with burping. He gets dyspnea on exertion. He had a nuclear stress test which showed scar but no ischemia. This is the 4th  office visit since the patient had an anterior wall STEMI  on February 5, 2018. He went to the [facility] emergency room. He underwent emergent cardiac catheterization and had a stent placed and a large ramus artery. He was discharged 3 days later. He has not had chest pain since the hospital. \par He currently has an upper respiratory infection. He is trying to quit smoking. He feels short of breath when he lies down to sleep. He wakes up frequently at night did not want anything for sleep apnea. When he wakes up he goes and eats. He had some chest tingling but felt completely different than his heart attack discomfort.\par June 6, 2019: Patient has no new complaints. He denies any chest pains, shortness of breath, or palpitations. He is smoking to half and one pack per day. He may be moving to [place]. He intends to come up here periodically.\par October 10, 2019: The patient complains when he is lying on his right side that he gets short of breath.  He also sometimes gets short of breath when lying on his back.  He is quit smoking gone back many times.  He cannot seem to quit permanently.  He denies any chest pains or palpitations.  He has never been to a pulmonologist even though his primary doctor had recommended it years ago.  He takes pro-air inhaler inhaler very irregularly and only as needed.\par December 26, 2019: The patient was admitted approximately 2 weeks ago to [facility] with shortness of breath. He underwent cardiac catheterization on December 18, 2019. It showed progression of his circumflex disease with a low ejection fraction and 100% occlusion of his LAD. He underwent stenting of his circumflex disease. He was placed on Entresto, and  sent home. He now comes for followup.\par January 29, 2020: Patient's wife states that he coughs all night long and cannot lie down.  He had an episode of the left leg swelling and his internist sent him for venous Dopplers which were negative for DVT.  He has quit smoking for a month.  He denies any chest pains.  He was diagnosed as having obstructive sleep apnea in the hospital but he cannot tolerate the CPAP machine.  He is currently taking metoprolol 50 mg.  His pulse rates hovered around 100 bpm.  He is on Entresto but his blood pressure is 120/80.\par February 27, 2020: Recently the patient stopped his Trelegy and his coughing jags at night are much improved.  He was placed on antibiotic for possible bronchitis.  His heart rates have still been above 100 and he 1 day he tried taking metoprolol 100 mg instead of 75 and it came down to about 92.  Otherwise he has no new symptoms and he is feeling fairly good.\par May 27, 2020: The patient feels well.  He denies any chest pains, shortness of breath at rest, or palpitations.  He has not smoked for 6 months.  He had an echo done by his internist and it showed his ejection fraction went up 10%.  His Entresto was increased.\par

## 2020-05-27 NOTE — DISCUSSION/SUMMARY
[FreeTextEntry1] : The patient was examined. His blood pressure was 110/76. His pulse was 91 His lungs were clear to auscultation. Cardiac exam was  negative for murmurs rubs or gallops.His EKG showed  sinus rhythm, with  ST and T wave changes. No acute changes were seen.The patient will remain on his current medication, . He'll return in 4 months, or earlier if needed.

## 2020-07-07 NOTE — ED ADULT TRIAGE NOTE - CHIEF COMPLAINT QUOTE
shortness of breath for 2 hours You can access the FollowMyHealth Patient Portal offered by [facility] by registering at the following website: [email or web address]. By joining [facility]’s FollowMyHealth portal, you will also be able to view your health information using other applications (apps) compatible with our system.

## 2020-09-08 ENCOUNTER — INPATIENT (INPATIENT)
Facility: HOSPITAL | Age: 60
LOS: 12 days | Discharge: HOME HEALTH | DRG: 853 | End: 2020-09-21
Attending: INTERNAL MEDICINE | Admitting: STUDENT IN AN ORGANIZED HEALTH CARE EDUCATION/TRAINING PROGRAM
Payer: MEDICARE

## 2020-09-08 VITALS
WEIGHT: 279.99 LBS | OXYGEN SATURATION: 95 % | DIASTOLIC BLOOD PRESSURE: 70 MMHG | HEART RATE: 116 BPM | SYSTOLIC BLOOD PRESSURE: 106 MMHG | RESPIRATION RATE: 22 BRPM | TEMPERATURE: 103 F | HEIGHT: 77 IN

## 2020-09-08 LAB
ALBUMIN SERPL ELPH-MCNC: 4.1 G/DL — SIGNIFICANT CHANGE UP (ref 3.3–5)
ALP SERPL-CCNC: 70 U/L — SIGNIFICANT CHANGE UP (ref 40–120)
ALT FLD-CCNC: 36 U/L — SIGNIFICANT CHANGE UP (ref 10–45)
ANION GAP SERPL CALC-SCNC: 17 MMOL/L — SIGNIFICANT CHANGE UP (ref 5–17)
APPEARANCE UR: CLEAR — SIGNIFICANT CHANGE UP
APTT BLD: 31.4 SEC — SIGNIFICANT CHANGE UP (ref 27.5–35.5)
AST SERPL-CCNC: 33 U/L — SIGNIFICANT CHANGE UP (ref 10–40)
BACTERIA # UR AUTO: NEGATIVE — SIGNIFICANT CHANGE UP
BASOPHILS # BLD AUTO: 0.03 K/UL — SIGNIFICANT CHANGE UP (ref 0–0.2)
BASOPHILS NFR BLD AUTO: 0.3 % — SIGNIFICANT CHANGE UP (ref 0–2)
BILIRUB SERPL-MCNC: 0.6 MG/DL — SIGNIFICANT CHANGE UP (ref 0.2–1.2)
BILIRUB UR-MCNC: NEGATIVE — SIGNIFICANT CHANGE UP
BUN SERPL-MCNC: 32 MG/DL — HIGH (ref 7–23)
CALCIUM SERPL-MCNC: 9.4 MG/DL — SIGNIFICANT CHANGE UP (ref 8.4–10.5)
CHLORIDE SERPL-SCNC: 97 MMOL/L — SIGNIFICANT CHANGE UP (ref 96–108)
CO2 SERPL-SCNC: 22 MMOL/L — SIGNIFICANT CHANGE UP (ref 22–31)
COLOR SPEC: YELLOW — SIGNIFICANT CHANGE UP
CREAT SERPL-MCNC: 1.88 MG/DL — HIGH (ref 0.5–1.3)
DIFF PNL FLD: ABNORMAL
EOSINOPHIL # BLD AUTO: 0 K/UL — SIGNIFICANT CHANGE UP (ref 0–0.5)
EOSINOPHIL NFR BLD AUTO: 0 % — SIGNIFICANT CHANGE UP (ref 0–6)
EPI CELLS # UR: 1 /HPF — SIGNIFICANT CHANGE UP
GLUCOSE SERPL-MCNC: 247 MG/DL — HIGH (ref 70–99)
GLUCOSE UR QL: NEGATIVE — SIGNIFICANT CHANGE UP
HCT VFR BLD CALC: 40.5 % — SIGNIFICANT CHANGE UP (ref 39–50)
HGB BLD-MCNC: 13.1 G/DL — SIGNIFICANT CHANGE UP (ref 13–17)
HYALINE CASTS # UR AUTO: 2 /LPF — SIGNIFICANT CHANGE UP (ref 0–7)
IMM GRANULOCYTES NFR BLD AUTO: 0.8 % — SIGNIFICANT CHANGE UP (ref 0–1.5)
INR BLD: 1.16 RATIO — SIGNIFICANT CHANGE UP (ref 0.88–1.16)
KETONES UR-MCNC: NEGATIVE — SIGNIFICANT CHANGE UP
LACTATE BLDV-MCNC: 1.8 MMOL/L — SIGNIFICANT CHANGE UP (ref 0.7–2)
LEUKOCYTE ESTERASE UR-ACNC: NEGATIVE — SIGNIFICANT CHANGE UP
LYMPHOCYTES # BLD AUTO: 0.82 K/UL — LOW (ref 1–3.3)
LYMPHOCYTES # BLD AUTO: 8 % — LOW (ref 13–44)
MANUAL SMEAR VERIFICATION: SIGNIFICANT CHANGE UP
MCHC RBC-ENTMCNC: 25.6 PG — LOW (ref 27–34)
MCHC RBC-ENTMCNC: 32.3 GM/DL — SIGNIFICANT CHANGE UP (ref 32–36)
MCV RBC AUTO: 79.3 FL — LOW (ref 80–100)
MONOCYTES # BLD AUTO: 0.96 K/UL — HIGH (ref 0–0.9)
MONOCYTES NFR BLD AUTO: 9.3 % — SIGNIFICANT CHANGE UP (ref 2–14)
NEUTROPHILS # BLD AUTO: 8.39 K/UL — HIGH (ref 1.8–7.4)
NEUTROPHILS NFR BLD AUTO: 81.6 % — HIGH (ref 43–77)
NITRITE UR-MCNC: NEGATIVE — SIGNIFICANT CHANGE UP
NRBC # BLD: 0 /100 WBCS — SIGNIFICANT CHANGE UP (ref 0–0)
PH UR: 6 — SIGNIFICANT CHANGE UP (ref 5–8)
PLAT MORPH BLD: NORMAL — SIGNIFICANT CHANGE UP
PLATELET # BLD AUTO: 146 K/UL — LOW (ref 150–400)
PLATELET CLUMP BLD QL SMEAR: SLIGHT
POTASSIUM SERPL-MCNC: 3.5 MMOL/L — SIGNIFICANT CHANGE UP (ref 3.5–5.3)
POTASSIUM SERPL-SCNC: 3.5 MMOL/L — SIGNIFICANT CHANGE UP (ref 3.5–5.3)
PROT SERPL-MCNC: 7.4 G/DL — SIGNIFICANT CHANGE UP (ref 6–8.3)
PROT UR-MCNC: ABNORMAL
PROTHROM AB SERPL-ACNC: 13.7 SEC — HIGH (ref 10.6–13.6)
RBC # BLD: 5.11 M/UL — SIGNIFICANT CHANGE UP (ref 4.2–5.8)
RBC # FLD: 15.4 % — HIGH (ref 10.3–14.5)
RBC BLD AUTO: SIGNIFICANT CHANGE UP
RBC CASTS # UR COMP ASSIST: 2 /HPF — SIGNIFICANT CHANGE UP (ref 0–4)
SODIUM SERPL-SCNC: 136 MMOL/L — SIGNIFICANT CHANGE UP (ref 135–145)
SP GR SPEC: 1.02 — SIGNIFICANT CHANGE UP (ref 1.01–1.02)
UROBILINOGEN FLD QL: NEGATIVE — SIGNIFICANT CHANGE UP
WBC # BLD: 10.28 K/UL — SIGNIFICANT CHANGE UP (ref 3.8–10.5)
WBC # FLD AUTO: 10.28 K/UL — SIGNIFICANT CHANGE UP (ref 3.8–10.5)
WBC UR QL: 1 /HPF — SIGNIFICANT CHANGE UP (ref 0–5)

## 2020-09-08 PROCEDURE — 93010 ELECTROCARDIOGRAM REPORT: CPT | Mod: GC

## 2020-09-08 PROCEDURE — 99285 EMERGENCY DEPT VISIT HI MDM: CPT | Mod: CS,GC

## 2020-09-08 PROCEDURE — 71045 X-RAY EXAM CHEST 1 VIEW: CPT | Mod: 26

## 2020-09-08 RX ORDER — NOREPINEPHRINE BITARTRATE/D5W 8 MG/250ML
0.05 PLASTIC BAG, INJECTION (ML) INTRAVENOUS
Qty: 8 | Refills: 0 | Status: DISCONTINUED | OUTPATIENT
Start: 2020-09-08 | End: 2020-09-09

## 2020-09-08 RX ORDER — IBUPROFEN 200 MG
600 TABLET ORAL ONCE
Refills: 0 | Status: COMPLETED | OUTPATIENT
Start: 2020-09-08 | End: 2020-09-08

## 2020-09-08 RX ORDER — ACETAMINOPHEN 500 MG
975 TABLET ORAL ONCE
Refills: 0 | Status: COMPLETED | OUTPATIENT
Start: 2020-09-08 | End: 2020-09-08

## 2020-09-08 RX ORDER — SODIUM CHLORIDE 9 MG/ML
500 INJECTION, SOLUTION INTRAVENOUS ONCE
Refills: 0 | Status: COMPLETED | OUTPATIENT
Start: 2020-09-08 | End: 2020-09-08

## 2020-09-08 RX ORDER — SODIUM CHLORIDE 9 MG/ML
1000 INJECTION, SOLUTION INTRAVENOUS ONCE
Refills: 0 | Status: DISCONTINUED | OUTPATIENT
Start: 2020-09-08 | End: 2020-09-08

## 2020-09-08 RX ORDER — SODIUM CHLORIDE 9 MG/ML
500 INJECTION INTRAMUSCULAR; INTRAVENOUS; SUBCUTANEOUS ONCE
Refills: 0 | Status: COMPLETED | OUTPATIENT
Start: 2020-09-08 | End: 2020-09-08

## 2020-09-08 RX ORDER — CEFTRIAXONE 500 MG/1
1000 INJECTION, POWDER, FOR SOLUTION INTRAMUSCULAR; INTRAVENOUS ONCE
Refills: 0 | Status: COMPLETED | OUTPATIENT
Start: 2020-09-08 | End: 2020-09-08

## 2020-09-08 RX ORDER — AZITHROMYCIN 500 MG/1
500 TABLET, FILM COATED ORAL ONCE
Refills: 0 | Status: COMPLETED | OUTPATIENT
Start: 2020-09-08 | End: 2020-09-08

## 2020-09-08 RX ADMIN — CEFTRIAXONE 100 MILLIGRAM(S): 500 INJECTION, POWDER, FOR SOLUTION INTRAMUSCULAR; INTRAVENOUS at 20:42

## 2020-09-08 RX ADMIN — Medication 975 MILLIGRAM(S): at 20:42

## 2020-09-08 RX ADMIN — Medication 975 MILLIGRAM(S): at 21:12

## 2020-09-08 RX ADMIN — AZITHROMYCIN 250 MILLIGRAM(S): 500 TABLET, FILM COATED ORAL at 20:58

## 2020-09-08 RX ADMIN — Medication 600 MILLIGRAM(S): at 21:45

## 2020-09-08 RX ADMIN — SODIUM CHLORIDE 500 MILLILITER(S): 9 INJECTION INTRAMUSCULAR; INTRAVENOUS; SUBCUTANEOUS at 20:58

## 2020-09-08 RX ADMIN — SODIUM CHLORIDE 500 MILLILITER(S): 9 INJECTION, SOLUTION INTRAVENOUS at 21:45

## 2020-09-08 RX ADMIN — Medication 11.9 MICROGRAM(S)/KG/MIN: at 22:25

## 2020-09-08 RX ADMIN — Medication 600 MILLIGRAM(S): at 22:15

## 2020-09-08 NOTE — ED PROVIDER NOTE - ATTENDING CONTRIBUTION TO CARE
I performed a history and physical exam of the patient and discussed their management with the resident.  I reviewed the resident's note and agree with the documented findings and plan of care except as noted below. My medical decision making and observations are as follows:    60yo M with PMHx of HFrEF (10-15%), AICD, DM p/w fever and cough for 2 days. Pt denies any sick contacts, recent travel, nausea, vomiting, diarrhea or other changes. Endorses seeing his doctor who told him to come in to the hospital. Denies any urinary symptoms or other changes.  Pt in NAD, tachycardic, lungs cta, abd obese soft ntnd, no peripheral edema.  Septic from possible pna vs other infectious source - given heart failure with poor EF, will give gentle hydration, abx, send all labs, cultures, cxr and plan for admission.

## 2020-09-08 NOTE — ED ADULT NURSE NOTE - OBJECTIVE STATEMENT
58 yo male with a PMH of COPD, DM, CAD s/p x 3 stents and AICD presents to the ED via waiting room from home complaining of fever and SOB that began yesterday worsening today. Patient states that he went to go visit his PCP recently and "I was diagnosed with possible pna or a UTI" and was prescribed a diuretic. Denies any pain or increase in frequency of urination. Patient presents today because fever was higher and increase in SOB. States he took tylenol at about 1600. Upon presentation, patient is ambulatory and independent. Patient states he feels SOB but denies any chest pain at this time. Patient remains febrile and tachycardic in ER, placed on cardiac monitor. Denies headache, dizziness, vision changes, chest pain, abdominal pain, nausea, vomiting, diarrhea, chills, dysuria, hematuria, recent travel or fall.

## 2020-09-08 NOTE — ED PROVIDER NOTE - CLINICAL SUMMARY MEDICAL DECISION MAKING FREE TEXT BOX
60yo M with HFrEF p/w sepsis from likely pneumonia. Will start abx for CAP, give small amounts of fluids given heart failure and likely admit.

## 2020-09-08 NOTE — ED ADULT NURSE REASSESSMENT NOTE - NS ED NURSE REASSESS COMMENT FT1
Patient stating, "I don't feel good. Something feels off, I can't explain it. I feel like....short of breath?" Denies chest pain or pressure at this time. MD Camilo made aware. Second 500 mls of fluids stopped. Patient remains hypotensive.

## 2020-09-08 NOTE — ED PROVIDER NOTE - OBJECTIVE STATEMENT
58yo M with PMHx of HFrEF (10-15%), AICD, DM p/w fever and cough for 2 days. Pt denies any sick contacts, recent travel, nausea, vomiting, diarrhea or other changes. Endorses seeing his doctor who told him to come in to the hospital. Denies any urinary symptoms or other changes.

## 2020-09-08 NOTE — ED PROVIDER NOTE - PROGRESS NOTE DETAILS
Pt noted to have low BPs (80s/50s).  An additional 500cc bolus of fluids was ordered, but patient began expressing he felt difficulty breathing and had persistently low BP (79/52).  Fluids stopped and levo started.  MICU consulted - Shannon Camilo DO

## 2020-09-08 NOTE — ED ADULT TRIAGE NOTE - CHIEF COMPLAINT QUOTE
fever sob since yesterday; temp 102; saw MD last week; given diuretic for "fluid in lung"; comes in worsening symptoms.

## 2020-09-08 NOTE — ED ADULT NURSE REASSESSMENT NOTE - NS ED NURSE REASSESS COMMENT FT1
Bladder scan per MD orders, only 67 mls of urine seen max. Patient states he feels like he can urinate at this time, provided with urinal. MD Camilo made aware.

## 2020-09-08 NOTE — ED PROVIDER NOTE - CROS ED SKIN ALL NEG
Nutrition Services: Brief Update  Weight: 172 pounds/78.1 kg  Weight Change: Weight down 2 pounds in the last week but stable with initial weight from February.     I briefly met with patient following XRT this morning.  Patient reports he is feeling well and eating very well overall - he suspects he is actually eating more than he probably needs.  I reiterated the importance of adequate nutrition.  Patient reports no new side effects from treatment other than some skin breakdown at the radiation site which he is using lotion on.  He has not nutrition related questions or concerns at this time.     RD to continue to monitor throughout treatment.  Please contact [phone]    
negative...

## 2020-09-09 ENCOUNTER — TRANSCRIPTION ENCOUNTER (OUTPATIENT)
Age: 60
End: 2020-09-09

## 2020-09-09 DIAGNOSIS — E66.9 OBESITY, UNSPECIFIED: ICD-10-CM

## 2020-09-09 DIAGNOSIS — Z98.52 VASECTOMY STATUS: Chronic | ICD-10-CM

## 2020-09-09 DIAGNOSIS — A41.9 SEPSIS, UNSPECIFIED ORGANISM: ICD-10-CM

## 2020-09-09 DIAGNOSIS — E11.9 TYPE 2 DIABETES MELLITUS WITHOUT COMPLICATIONS: ICD-10-CM

## 2020-09-09 LAB
-  STREPTOCOCCUS SP. (NOT GRP A, B OR S PNEUMONIAE): SIGNIFICANT CHANGE UP
A1C WITH ESTIMATED AVERAGE GLUCOSE RESULT: 11.2 % — HIGH (ref 4–5.6)
ALBUMIN SERPL ELPH-MCNC: 3.9 G/DL — SIGNIFICANT CHANGE UP (ref 3.3–5)
ALP SERPL-CCNC: 66 U/L — SIGNIFICANT CHANGE UP (ref 40–120)
ALT FLD-CCNC: 41 U/L — SIGNIFICANT CHANGE UP (ref 10–45)
ANION GAP SERPL CALC-SCNC: 16 MMOL/L — SIGNIFICANT CHANGE UP (ref 5–17)
ANION GAP SERPL CALC-SCNC: 18 MMOL/L — HIGH (ref 5–17)
APPEARANCE UR: CLEAR — SIGNIFICANT CHANGE UP
APTT BLD: 30 SEC — SIGNIFICANT CHANGE UP (ref 27.5–35.5)
AST SERPL-CCNC: 41 U/L — HIGH (ref 10–40)
BASE EXCESS BLDA CALC-SCNC: 0.6 MMOL/L — SIGNIFICANT CHANGE UP (ref -2–2)
BASOPHILS # BLD AUTO: 0.03 K/UL — SIGNIFICANT CHANGE UP (ref 0–0.2)
BASOPHILS NFR BLD AUTO: 0.3 % — SIGNIFICANT CHANGE UP (ref 0–2)
BILIRUB SERPL-MCNC: 0.7 MG/DL — SIGNIFICANT CHANGE UP (ref 0.2–1.2)
BILIRUB UR-MCNC: NEGATIVE — SIGNIFICANT CHANGE UP
BUN SERPL-MCNC: 38 MG/DL — HIGH (ref 7–23)
BUN SERPL-MCNC: 40 MG/DL — HIGH (ref 7–23)
CALCIUM SERPL-MCNC: 8.6 MG/DL — SIGNIFICANT CHANGE UP (ref 8.4–10.5)
CALCIUM SERPL-MCNC: 8.9 MG/DL — SIGNIFICANT CHANGE UP (ref 8.4–10.5)
CHLORIDE SERPL-SCNC: 94 MMOL/L — LOW (ref 96–108)
CHLORIDE SERPL-SCNC: 94 MMOL/L — LOW (ref 96–108)
CO2 BLDA-SCNC: 25 MMOL/L — SIGNIFICANT CHANGE UP (ref 22–30)
CO2 SERPL-SCNC: 20 MMOL/L — LOW (ref 22–31)
CO2 SERPL-SCNC: 22 MMOL/L — SIGNIFICANT CHANGE UP (ref 22–31)
COLOR SPEC: SIGNIFICANT CHANGE UP
CREAT SERPL-MCNC: 2.02 MG/DL — HIGH (ref 0.5–1.3)
CREAT SERPL-MCNC: 2.07 MG/DL — HIGH (ref 0.5–1.3)
CULTURE RESULTS: SIGNIFICANT CHANGE UP
CULTURE RESULTS: SIGNIFICANT CHANGE UP
DIFF PNL FLD: ABNORMAL
EOSINOPHIL # BLD AUTO: 0 K/UL — SIGNIFICANT CHANGE UP (ref 0–0.5)
EOSINOPHIL NFR BLD AUTO: 0 % — SIGNIFICANT CHANGE UP (ref 0–6)
ESTIMATED AVERAGE GLUCOSE: 275 MG/DL — HIGH (ref 68–114)
GAS PNL BLDA: SIGNIFICANT CHANGE UP
GLUCOSE BLDC GLUCOMTR-MCNC: 353 MG/DL — HIGH (ref 70–99)
GLUCOSE BLDC GLUCOMTR-MCNC: 359 MG/DL — HIGH (ref 70–99)
GLUCOSE BLDC GLUCOMTR-MCNC: 366 MG/DL — HIGH (ref 70–99)
GLUCOSE SERPL-MCNC: 382 MG/DL — HIGH (ref 70–99)
GLUCOSE SERPL-MCNC: 416 MG/DL — HIGH (ref 70–99)
GLUCOSE UR QL: ABNORMAL
GRAM STN FLD: SIGNIFICANT CHANGE UP
HCO3 BLDA-SCNC: 24 MMOL/L — SIGNIFICANT CHANGE UP (ref 21–29)
HCT VFR BLD CALC: 39.4 % — SIGNIFICANT CHANGE UP (ref 39–50)
HGB BLD-MCNC: 12.8 G/DL — LOW (ref 13–17)
HOROWITZ INDEX BLDA+IHG-RTO: 21 — SIGNIFICANT CHANGE UP
IMM GRANULOCYTES NFR BLD AUTO: 1 % — SIGNIFICANT CHANGE UP (ref 0–1.5)
INR BLD: 1.17 RATIO — HIGH (ref 0.88–1.16)
KETONES UR-MCNC: NEGATIVE — SIGNIFICANT CHANGE UP
LEGIONELLA AG UR QL: NEGATIVE — SIGNIFICANT CHANGE UP
LEUKOCYTE ESTERASE UR-ACNC: NEGATIVE — SIGNIFICANT CHANGE UP
LYMPHOCYTES # BLD AUTO: 0.75 K/UL — LOW (ref 1–3.3)
LYMPHOCYTES # BLD AUTO: 6.7 % — LOW (ref 13–44)
MAGNESIUM SERPL-MCNC: 1.6 MG/DL — SIGNIFICANT CHANGE UP (ref 1.6–2.6)
MAGNESIUM SERPL-MCNC: 1.8 MG/DL — SIGNIFICANT CHANGE UP (ref 1.6–2.6)
MCHC RBC-ENTMCNC: 25.9 PG — LOW (ref 27–34)
MCHC RBC-ENTMCNC: 32.5 GM/DL — SIGNIFICANT CHANGE UP (ref 32–36)
MCV RBC AUTO: 79.8 FL — LOW (ref 80–100)
METHOD TYPE: SIGNIFICANT CHANGE UP
MONOCYTES # BLD AUTO: 1.02 K/UL — HIGH (ref 0–0.9)
MONOCYTES NFR BLD AUTO: 9.1 % — SIGNIFICANT CHANGE UP (ref 2–14)
NEUTROPHILS # BLD AUTO: 9.25 K/UL — HIGH (ref 1.8–7.4)
NEUTROPHILS NFR BLD AUTO: 82.9 % — HIGH (ref 43–77)
NITRITE UR-MCNC: NEGATIVE — SIGNIFICANT CHANGE UP
NRBC # BLD: 0 /100 WBCS — SIGNIFICANT CHANGE UP (ref 0–0)
PCO2 BLDA: 35 MMHG — SIGNIFICANT CHANGE UP (ref 32–46)
PH BLDA: 7.45 — SIGNIFICANT CHANGE UP (ref 7.35–7.45)
PH UR: 6 — SIGNIFICANT CHANGE UP (ref 5–8)
PHOSPHATE SERPL-MCNC: 2.9 MG/DL — SIGNIFICANT CHANGE UP (ref 2.5–4.5)
PHOSPHATE SERPL-MCNC: 3.6 MG/DL — SIGNIFICANT CHANGE UP (ref 2.5–4.5)
PLATELET # BLD AUTO: 116 K/UL — LOW (ref 150–400)
PO2 BLDA: 42 MMHG — CRITICAL LOW (ref 74–108)
POTASSIUM SERPL-MCNC: 3.3 MMOL/L — LOW (ref 3.5–5.3)
POTASSIUM SERPL-MCNC: 3.5 MMOL/L — SIGNIFICANT CHANGE UP (ref 3.5–5.3)
POTASSIUM SERPL-SCNC: 3.3 MMOL/L — LOW (ref 3.5–5.3)
POTASSIUM SERPL-SCNC: 3.5 MMOL/L — SIGNIFICANT CHANGE UP (ref 3.5–5.3)
PROT SERPL-MCNC: 7 G/DL — SIGNIFICANT CHANGE UP (ref 6–8.3)
PROT UR-MCNC: ABNORMAL
PROTHROM AB SERPL-ACNC: 13.8 SEC — HIGH (ref 10.6–13.6)
RAPID RVP RESULT: SIGNIFICANT CHANGE UP
RBC # BLD: 4.94 M/UL — SIGNIFICANT CHANGE UP (ref 4.2–5.8)
RBC # FLD: 15.9 % — HIGH (ref 10.3–14.5)
SAO2 % BLDA: 73 % — LOW (ref 92–96)
SARS-COV-2 RNA SPEC QL NAA+PROBE: SIGNIFICANT CHANGE UP
SODIUM SERPL-SCNC: 132 MMOL/L — LOW (ref 135–145)
SODIUM SERPL-SCNC: 132 MMOL/L — LOW (ref 135–145)
SP GR SPEC: 1.01 — SIGNIFICANT CHANGE UP (ref 1.01–1.02)
SPECIMEN SOURCE: SIGNIFICANT CHANGE UP
TSH SERPL-MCNC: 1.74 UIU/ML — SIGNIFICANT CHANGE UP (ref 0.27–4.2)
UROBILINOGEN FLD QL: NEGATIVE — SIGNIFICANT CHANGE UP
WBC # BLD: 11.16 K/UL — HIGH (ref 3.8–10.5)
WBC # FLD AUTO: 11.16 K/UL — HIGH (ref 3.8–10.5)

## 2020-09-09 PROCEDURE — 99223 1ST HOSP IP/OBS HIGH 75: CPT | Mod: 57

## 2020-09-09 PROCEDURE — 99223 1ST HOSP IP/OBS HIGH 75: CPT

## 2020-09-09 PROCEDURE — 74176 CT ABD & PELVIS W/O CONTRAST: CPT | Mod: 26

## 2020-09-09 PROCEDURE — 71250 CT THORAX DX C-: CPT | Mod: 26

## 2020-09-09 PROCEDURE — 93306 TTE W/DOPPLER COMPLETE: CPT | Mod: 26

## 2020-09-09 PROCEDURE — 99291 CRITICAL CARE FIRST HOUR: CPT | Mod: 25

## 2020-09-09 RX ORDER — FLUTICASONE FUROATE, UMECLIDINIUM BROMIDE AND VILANTEROL TRIFENATATE 200; 62.5; 25 UG/1; UG/1; UG/1
1 POWDER RESPIRATORY (INHALATION)
Qty: 0 | Refills: 0 | DISCHARGE

## 2020-09-09 RX ORDER — CEFTRIAXONE 500 MG/1
2 INJECTION, POWDER, FOR SOLUTION INTRAMUSCULAR; INTRAVENOUS EVERY 24 HOURS
Refills: 0 | Status: DISCONTINUED | OUTPATIENT
Start: 2020-09-09 | End: 2020-09-10

## 2020-09-09 RX ORDER — INSULIN LISPRO 100/ML
VIAL (ML) SUBCUTANEOUS
Refills: 0 | Status: DISCONTINUED | OUTPATIENT
Start: 2020-09-09 | End: 2020-09-21

## 2020-09-09 RX ORDER — CHLORHEXIDINE GLUCONATE 213 G/1000ML
1 SOLUTION TOPICAL
Refills: 0 | Status: DISCONTINUED | OUTPATIENT
Start: 2020-09-09 | End: 2020-09-21

## 2020-09-09 RX ORDER — SODIUM CHLORIDE 9 MG/ML
1000 INJECTION, SOLUTION INTRAVENOUS
Refills: 0 | Status: DISCONTINUED | OUTPATIENT
Start: 2020-09-09 | End: 2020-09-21

## 2020-09-09 RX ORDER — DEXTROSE 50 % IN WATER 50 %
12.5 SYRINGE (ML) INTRAVENOUS ONCE
Refills: 0 | Status: DISCONTINUED | OUTPATIENT
Start: 2020-09-09 | End: 2020-09-21

## 2020-09-09 RX ORDER — ACETAMINOPHEN 500 MG
650 TABLET ORAL ONCE
Refills: 0 | Status: DISCONTINUED | OUTPATIENT
Start: 2020-09-09 | End: 2020-09-09

## 2020-09-09 RX ORDER — INSULIN LISPRO 100/ML
10 VIAL (ML) SUBCUTANEOUS
Refills: 0 | Status: DISCONTINUED | OUTPATIENT
Start: 2020-09-09 | End: 2020-09-11

## 2020-09-09 RX ORDER — ONDANSETRON 8 MG/1
4 TABLET, FILM COATED ORAL ONCE
Refills: 0 | Status: DISCONTINUED | OUTPATIENT
Start: 2020-09-09 | End: 2020-09-11

## 2020-09-09 RX ORDER — CLOPIDOGREL BISULFATE 75 MG/1
75 TABLET, FILM COATED ORAL DAILY
Refills: 0 | Status: DISCONTINUED | OUTPATIENT
Start: 2020-09-09 | End: 2020-09-12

## 2020-09-09 RX ORDER — ACETAMINOPHEN 500 MG
650 TABLET ORAL EVERY 6 HOURS
Refills: 0 | Status: DISCONTINUED | OUTPATIENT
Start: 2020-09-09 | End: 2020-09-21

## 2020-09-09 RX ORDER — PANTOPRAZOLE SODIUM 20 MG/1
40 TABLET, DELAYED RELEASE ORAL
Refills: 0 | Status: DISCONTINUED | OUTPATIENT
Start: 2020-09-09 | End: 2020-09-21

## 2020-09-09 RX ORDER — INFLUENZA VIRUS VACCINE 15; 15; 15; 15 UG/.5ML; UG/.5ML; UG/.5ML; UG/.5ML
0.5 SUSPENSION INTRAMUSCULAR ONCE
Refills: 0 | Status: COMPLETED | OUTPATIENT
Start: 2020-09-09 | End: 2020-09-21

## 2020-09-09 RX ORDER — INSULIN GLARGINE 100 [IU]/ML
50 INJECTION, SOLUTION SUBCUTANEOUS AT BEDTIME
Refills: 0 | Status: DISCONTINUED | OUTPATIENT
Start: 2020-09-09 | End: 2020-09-11

## 2020-09-09 RX ORDER — MIDODRINE HYDROCHLORIDE 2.5 MG/1
10 TABLET ORAL EVERY 8 HOURS
Refills: 0 | Status: DISCONTINUED | OUTPATIENT
Start: 2020-09-09 | End: 2020-09-09

## 2020-09-09 RX ORDER — AZITHROMYCIN 500 MG/1
500 TABLET, FILM COATED ORAL EVERY 24 HOURS
Refills: 0 | Status: DISCONTINUED | OUTPATIENT
Start: 2020-09-09 | End: 2020-09-09

## 2020-09-09 RX ORDER — FUROSEMIDE 40 MG
80 TABLET ORAL ONCE
Refills: 0 | Status: COMPLETED | OUTPATIENT
Start: 2020-09-09 | End: 2020-09-09

## 2020-09-09 RX ORDER — HEPARIN SODIUM 5000 [USP'U]/ML
5000 INJECTION INTRAVENOUS; SUBCUTANEOUS EVERY 8 HOURS
Refills: 0 | Status: DISCONTINUED | OUTPATIENT
Start: 2020-09-09 | End: 2020-09-09

## 2020-09-09 RX ORDER — GLUCAGON INJECTION, SOLUTION 0.5 MG/.1ML
1 INJECTION, SOLUTION SUBCUTANEOUS ONCE
Refills: 0 | Status: DISCONTINUED | OUTPATIENT
Start: 2020-09-09 | End: 2020-09-21

## 2020-09-09 RX ORDER — ACETAMINOPHEN 500 MG
650 TABLET ORAL ONCE
Refills: 0 | Status: COMPLETED | OUTPATIENT
Start: 2020-09-09 | End: 2020-09-09

## 2020-09-09 RX ORDER — DEXTROSE 50 % IN WATER 50 %
15 SYRINGE (ML) INTRAVENOUS ONCE
Refills: 0 | Status: DISCONTINUED | OUTPATIENT
Start: 2020-09-09 | End: 2020-09-21

## 2020-09-09 RX ORDER — INSULIN LISPRO 100/ML
VIAL (ML) SUBCUTANEOUS AT BEDTIME
Refills: 0 | Status: DISCONTINUED | OUTPATIENT
Start: 2020-09-09 | End: 2020-09-21

## 2020-09-09 RX ORDER — METOPROLOL TARTRATE 50 MG
1 TABLET ORAL
Qty: 0 | Refills: 2 | DISCHARGE

## 2020-09-09 RX ORDER — ASPIRIN/CALCIUM CARB/MAGNESIUM 324 MG
81 TABLET ORAL DAILY
Refills: 0 | Status: DISCONTINUED | OUTPATIENT
Start: 2020-09-09 | End: 2020-09-21

## 2020-09-09 RX ORDER — MIDODRINE HYDROCHLORIDE 2.5 MG/1
10 TABLET ORAL EVERY 8 HOURS
Refills: 0 | Status: DISCONTINUED | OUTPATIENT
Start: 2020-09-09 | End: 2020-09-11

## 2020-09-09 RX ORDER — VANCOMYCIN HCL 1 G
1500 VIAL (EA) INTRAVENOUS ONCE
Refills: 0 | Status: CANCELLED | OUTPATIENT
Start: 2020-09-09 | End: 2020-09-21

## 2020-09-09 RX ORDER — LORATADINE 10 MG/1
10 TABLET ORAL DAILY
Refills: 0 | Status: DISCONTINUED | OUTPATIENT
Start: 2020-09-09 | End: 2020-09-21

## 2020-09-09 RX ORDER — DEXTROSE 50 % IN WATER 50 %
25 SYRINGE (ML) INTRAVENOUS ONCE
Refills: 0 | Status: DISCONTINUED | OUTPATIENT
Start: 2020-09-09 | End: 2020-09-21

## 2020-09-09 RX ORDER — VANCOMYCIN HCL 1 G
1500 VIAL (EA) INTRAVENOUS ONCE
Refills: 0 | Status: COMPLETED | OUTPATIENT
Start: 2020-09-09 | End: 2020-09-09

## 2020-09-09 RX ORDER — ACETAMINOPHEN 500 MG
1000 TABLET ORAL ONCE
Refills: 0 | Status: COMPLETED | OUTPATIENT
Start: 2020-09-09 | End: 2020-09-09

## 2020-09-09 RX ORDER — INSULIN GLARGINE 100 [IU]/ML
34 INJECTION, SOLUTION SUBCUTANEOUS ONCE
Refills: 0 | Status: COMPLETED | OUTPATIENT
Start: 2020-09-09 | End: 2020-09-09

## 2020-09-09 RX ORDER — ALBUTEROL 90 UG/1
2 AEROSOL, METERED ORAL EVERY 12 HOURS
Refills: 0 | Status: DISCONTINUED | OUTPATIENT
Start: 2020-09-09 | End: 2020-09-21

## 2020-09-09 RX ORDER — NICOTINE POLACRILEX 2 MG
0 GUM BUCCAL
Qty: 0 | Refills: 0 | DISCHARGE

## 2020-09-09 RX ORDER — CEFTRIAXONE 500 MG/1
1000 INJECTION, POWDER, FOR SOLUTION INTRAMUSCULAR; INTRAVENOUS EVERY 24 HOURS
Refills: 0 | Status: DISCONTINUED | OUTPATIENT
Start: 2020-09-09 | End: 2020-09-09

## 2020-09-09 RX ORDER — TAMSULOSIN HYDROCHLORIDE 0.4 MG/1
0.4 CAPSULE ORAL AT BEDTIME
Refills: 0 | Status: DISCONTINUED | OUTPATIENT
Start: 2020-09-09 | End: 2020-09-21

## 2020-09-09 RX ORDER — ATORVASTATIN CALCIUM 80 MG/1
80 TABLET, FILM COATED ORAL AT BEDTIME
Refills: 0 | Status: DISCONTINUED | OUTPATIENT
Start: 2020-09-09 | End: 2020-09-21

## 2020-09-09 RX ORDER — MIDODRINE HYDROCHLORIDE 2.5 MG/1
10 TABLET ORAL ONCE
Refills: 0 | Status: COMPLETED | OUTPATIENT
Start: 2020-09-09 | End: 2020-09-09

## 2020-09-09 RX ORDER — METOPROLOL TARTRATE 50 MG
25 TABLET ORAL
Refills: 0 | Status: DISCONTINUED | OUTPATIENT
Start: 2020-09-09 | End: 2020-09-11

## 2020-09-09 RX ADMIN — Medication 25 MILLIGRAM(S): at 10:30

## 2020-09-09 RX ADMIN — MIDODRINE HYDROCHLORIDE 10 MILLIGRAM(S): 2.5 TABLET ORAL at 01:54

## 2020-09-09 RX ADMIN — TAMSULOSIN HYDROCHLORIDE 0.4 MILLIGRAM(S): 0.4 CAPSULE ORAL at 21:42

## 2020-09-09 RX ADMIN — Medication 81 MILLIGRAM(S): at 11:41

## 2020-09-09 RX ADMIN — INSULIN GLARGINE 34 UNIT(S): 100 INJECTION, SOLUTION SUBCUTANEOUS at 06:52

## 2020-09-09 RX ADMIN — CHLORHEXIDINE GLUCONATE 1 APPLICATION(S): 213 SOLUTION TOPICAL at 06:29

## 2020-09-09 RX ADMIN — Medication 3: at 21:41

## 2020-09-09 RX ADMIN — INSULIN GLARGINE 50 UNIT(S): 100 INJECTION, SOLUTION SUBCUTANEOUS at 21:41

## 2020-09-09 RX ADMIN — Medication 650 MILLIGRAM(S): at 17:00

## 2020-09-09 RX ADMIN — Medication 650 MILLIGRAM(S): at 16:26

## 2020-09-09 RX ADMIN — ATORVASTATIN CALCIUM 80 MILLIGRAM(S): 80 TABLET, FILM COATED ORAL at 21:42

## 2020-09-09 RX ADMIN — Medication 300 MILLIGRAM(S): at 14:18

## 2020-09-09 RX ADMIN — CLOPIDOGREL BISULFATE 75 MILLIGRAM(S): 75 TABLET, FILM COATED ORAL at 11:41

## 2020-09-09 RX ADMIN — Medication 80 MILLIGRAM(S): at 04:05

## 2020-09-09 RX ADMIN — CEFTRIAXONE 100 MILLIGRAM(S): 500 INJECTION, POWDER, FOR SOLUTION INTRAMUSCULAR; INTRAVENOUS at 19:23

## 2020-09-09 RX ADMIN — MIDODRINE HYDROCHLORIDE 10 MILLIGRAM(S): 2.5 TABLET ORAL at 01:16

## 2020-09-09 RX ADMIN — Medication 10: at 11:48

## 2020-09-09 RX ADMIN — Medication 10: at 18:23

## 2020-09-09 RX ADMIN — Medication 650 MILLIGRAM(S): at 11:18

## 2020-09-09 RX ADMIN — Medication 10: at 06:53

## 2020-09-09 RX ADMIN — Medication 10 UNIT(S): at 18:23

## 2020-09-09 RX ADMIN — Medication 25 MILLIGRAM(S): at 21:42

## 2020-09-09 RX ADMIN — Medication 650 MILLIGRAM(S): at 10:48

## 2020-09-09 RX ADMIN — LORATADINE 10 MILLIGRAM(S): 10 TABLET ORAL at 11:41

## 2020-09-09 RX ADMIN — Medication 11.9 MICROGRAM(S)/KG/MIN: at 01:54

## 2020-09-09 RX ADMIN — Medication 400 MILLIGRAM(S): at 04:05

## 2020-09-09 NOTE — CONSULT NOTE ADULT - PROBLEM SELECTOR RECOMMENDATION 9
Will continue scheduled-dose Lantus 50u at bedtime.  Will start Humalog 10u before each meal and continue Humalog correction scale coverage. Will continue monitoring FS, log, and FU.  Patient counseled for compliance with consistent low carb diet and exercise as tolerated outpatient.

## 2020-09-09 NOTE — CONSULT NOTE ADULT - SUBJECTIVE AND OBJECTIVE BOX
[facility]  [phone]  NEPHROLOGY      INITIAL CONSULT NOTE  --------------------------------------------------------------------------------  HPI:      59M PMH T2DM, HTN, CAD/MI s/p stents (most recent 2/2018), HFrEF (10-15%), ICD,  ischemic cardiomyopathy, COPD, HTN, GERD pw fever, n/v, back pain x 1 day.  He describes sudden onset left lower back pain, palliated by sitting upright, of sharp quality, without radiation, of 7/10 severity, which completely resolved when presenting to ED.  he felt nauseous and had an episode of NBNB emesis, He reports one instance of cloudy/dark urine while admitted, but denies dysuria, hematuria, change in frequency or foul odor. In addition he reports cough without sputum production. IN ED was found to have T 102.9,, BP 63/40  elevated BUN/SCr 32/1.88, hyperglycemic 247, elevated proBNP 1158, lactate Started on levophed. Pt admitted to MICU for management of hypotension requiring pressor support. While in ICU experienced emesis and diarrhea (without melena/hematochezia) x 1 with SOB.  also this am with worsening renal function. renal consult called. pt states few years ago had renal calculus as well. had ct chest fall 2019 which suggested right hydro. hydro again seen on ct abd now as well.     PAST HISTORY  --------------------------------------------------------------------------------  PAST MEDICAL & SURGICAL HISTORY:  H/O gastroesophageal reflux (GERD)  History of COPD  History of ischemic cardiomyopathy  Heart failure with reduced ejection fraction  Hypertension  AICD (automatic cardioverter/defibrillator) present  Diabetes  Stented coronary artery  H/O vasectomy: 20 yrs ago (2000)    FAMILY HISTORY:  Family history of cardiac disorder: Paternal  Family history of COPD (chronic obstructive pulmonary disease) (Sibling)    PAST SOCIAL HISTORY:  + past tobacco   ALLERGIES & MEDICATIONS  --------------------------------------------------------------------------------  Allergies    No Known Allergies    Intolerances      Standing Inpatient Medications  aspirin  chewable 81 milliGRAM(s) Oral daily  atorvastatin 80 milliGRAM(s) Oral at bedtime  cefTRIAXone   IVPB 2 milliGRAM(s) IV Intermittent every 24 hours  chlorhexidine 4% Liquid 1 Application(s) Topical <User Schedule>  clopidogrel Tablet 75 milliGRAM(s) Oral daily  dextrose 5%. 1000 milliLiter(s) IV Continuous <Continuous>  dextrose 50% Injectable 12.5 Gram(s) IV Push once  dextrose 50% Injectable 25 Gram(s) IV Push once  dextrose 50% Injectable 25 Gram(s) IV Push once  influenza   Vaccine 0.5 milliLiter(s) IntraMuscular once  insulin glargine Injectable (LANTUS) 50 Unit(s) SubCutaneous at bedtime  insulin lispro (HumaLOG) corrective regimen sliding scale   SubCutaneous three times a day before meals  insulin lispro (HumaLOG) corrective regimen sliding scale   SubCutaneous at bedtime  insulin lispro Injectable (HumaLOG) 10 Unit(s) SubCutaneous three times a day before meals  loratadine 10 milliGRAM(s) Oral daily  metoprolol tartrate 25 milliGRAM(s) Oral two times a day  midodrine 10 milliGRAM(s) Oral every 8 hours  ondansetron Injectable 4 milliGRAM(s) IV Push once  pantoprazole    Tablet 40 milliGRAM(s) Oral before breakfast  tamsulosin 0.4 milliGRAM(s) Oral at bedtime    PRN Inpatient Medications  acetaminophen   Tablet .. 650 milliGRAM(s) Oral every 6 hours PRN  ALBUTerol    90 MICROgram(s) HFA Inhaler 2 Puff(s) Inhalation every 12 hours PRN  dextrose 40% Gel 15 Gram(s) Oral once PRN  glucagon  Injectable 1 milliGRAM(s) IntraMuscular once PRN      REVIEW OF SYSTEMS  --------------------------------------------------------------------------------  Gen: + fevers/chills   Skin: No rashes  Head/Eyes/Ears/Mouth: No headache; Normal hearing;  No sinus pain/discomfort, sore throat  Respiratory: No dyspnea, cough, wheezing, hemoptysis  CV: No chest pain, orthopnea  GI: No abdominal pain, + diarrhea, + nausea, -  vomiting, -  melena, hematochezia  : No dysuria, + frequency  urinating   - hematuria, nocturia -   MSK: No joint pain/swelling; +  back pain  Neuro: No dizziness/lightheadedness  also with no edema     All other systems were reviewed and are negative, except as noted.    VITALS/PHYSICAL EXAM  --------------------------------------------------------------------------------  T(C): 37.5 (09-09-20 @ 19:00), Max: 38.6 (09-08-20 @ 21:30)  HR: 110 (09-09-20 @ 19:00) (90 - 120)  BP: 119/75 (09-09-20 @ 19:00) (63/40 - 142/72)  RR: 21 (09-09-20 @ 19:00) (16 - 37)  SpO2: 95% (09-09-20 @ 19:00) (90% - 100%)  Wt(kg): --  Height (cm): 195.6 (09-09-20 @ 01:40)  Weight (kg): 129.5 (09-09-20 @ 01:40)  BMI (kg/m2): 33.8 (09-09-20 @ 01:40)  BSA (m2): 2.6 (09-09-20 @ 01:40)      09-08-20 @ 07:01  -  09-09-20 @ 07:00  --------------------------------------------------------  IN: 107.8 mL / OUT: 1125 mL / NET: -1017.2 mL    09-09-20 @ 07:01  -  09-09-20 @ 19:55  --------------------------------------------------------  IN: 1000 mL / OUT: 1600 mL / NET: -600 mL      Physical Exam:  	Gen: Non toxic comfortable appearing   	no jvd  	Pulm: decrease bs  no rales or ronchi or wheezing  	CV: RRR, S1S2; no rub  	Back: No CVA tenderness; no sacral edema  	Abd: +BS, soft, nontender/nondistended  	: No suprapubic tenderness  	UE: Warm, no cyanosis  no clubbing,  no edema  	LE: Warm, no cyanosis  no clubbing, no edema  	Neuro: alert and oriented. speech coherent   	Skin: Warm, no decrease skin turgor       LABS/STUDIES  --------------------------------------------------------------------------------              12.8   11.16 >-----------<  116      [09-09-20 @ 04:33]              39.4     132  |  94  |  40  ----------------------------<  416      [09-09-20 @ 14:20]  3.3   |  22  |  2.02        Ca     8.9     [09-09-20 @ 14:20]      Mg     1.8     [09-09-20 @ 14:20]      Phos  2.9     [09-09-20 @ 14:20]    TPro  7.0  /  Alb  3.9  /  TBili  0.7  /  DBili  x   /  AST  41  /  ALT  41  /  AlkPhos  66  [09-09-20 @ 04:36]    PT/INR: PT 13.8 , INR 1.17       [09-09-20 @ 04:33]  PTT: 30.0       [09-09-20 @ 04:33]      Creatinine Trend:  SCr 2.02 [09-09 @ 14:20]  SCr 2.07 [09-09 @ 04:36]  SCr 1.88 [09-08 @ 20:42]    Urinalysis - [09-09-20 @ 07:23]      Color Light Yellow / Appearance Clear / SG 1.014 / pH 6.0      Gluc Trace / Ketone Negative  / Bili Negative / Urobili Negative       Blood Trace / Protein Trace / Leuk Est Negative / Nitrite Negative      RBC 2 / WBC 0 / Hyaline 4 / Gran  / Sq Epi  / Non Sq Epi 0 / Bacteria Negative      HbA1c 8.9      [12-16-19 @ 08:15]  TSH 1.74      [09-09-20 @ 07:25]    HCV 0.09, Nonreact      [12-16-19 @ 08:36]      < from: CT Abdomen and Pelvis No Cont (09.09.20 @ 01:26) >  EXAM:  CT ABDOMEN AND PELVIS                          EXAM:  CT CHEST                            PROCEDURE DATE:  09/09/2020            INTERPRETATION:  CLINICAL INFORMATION: Fever of unknown origin. Sepsis.    COMPARISON: CT chest 12/15/2019 and 11/11/2019.    PROCEDURE:  CT of the Chest, Abdomen and Pelvis was performed without intravenous contrast.  Intravenous contrast: None.  Oral contrast: None.  Sagittal and coronal reformats were performed.    FINDINGS:    Evaluation of the parenchymal organs and vascular structures is limited without intravenous contrast.    CHEST:  LUNGS AND LARGE AIRWAYS: Patent central airways. Paraseptal emphysema. Mild compressive atelectasis in the right lower lobe. No focal areas of consolidation.  PLEURA: Small right pleural effusion.  VESSELS: Minimal aortic calcifications.  HEART: Cardiomegaly. No pericardial effusion. Coronary artery stents.  MEDIASTINUM AND BOB: Mediastinal lymph nodes, unchanged.  An anterior mediastinal lymph node measures 1.4 x 1.2 cm (2:23).  CHEST WALL AND LOWER NECK: Left-sided chest wall AICD.    ABDOMEN AND PELVIS:  LIVER: Hepatomegalyl and diffuse hepatic steatosis. Focal fatty sparing is noted adjacent to the gallbladder.  BILE DUCTS: Normal caliber.  GALLBLADDER: Within normal limits.  SPLEEN: Spleen is enlarged, measuring 15.3 cm in length.  PANCREAS: Within normal limits.  ADRENALS: Within normal limits.  KIDNEYS/URETERS: Atrophic right kidney with chronic right hydronephrosis, similar to 11/11/2019. Right renal cyst. An indeterminate 0.7 cm soft tissue density is noted in the interpolar region of the right kidney. A few nonobstructing left renal calculi measuring up to 3 mm. No hydronephrosis of the left kidney.    BLADDER: Underdistended.  REPRODUCTIVE ORGANS: Prostate is mildly enlarged.    BOWEL: Scattered colonic diverticula. No bowel obstruction. Appendectomy.  PERITONEUM: No ascites.  VESSELS: Atherosclerotic calcification.  RETROPERITONEUM/LYMPH NODES: Less than 1 cm retroperitoneal lymph nodes.  ABDOMINAL WALL: Within normal limits.  BONES: Mild degenerative changes in the spine.    IMPRESSION:  No pneumonia.    Emphysema.    No bowel or obstruction.    Hepatomegaly and diffuse hepatic steatosis.    Splenomegaly.    Atrophic right kidney, with severe hydronephrosis. A 0.7 cm soft tissue density is noted in the interpolar region of the right kidney and is incompletely characterized. Ultrasound may be helpful for further evaluation.      < end of copied text >

## 2020-09-09 NOTE — PROVIDER CONTACT NOTE (CHANGE IN STATUS NOTIFICATION) - SITUATION
Patient increasingly tachycardic and dyspneic. O2 saturation decreased from 99% to 91% on room air. Patient experiencing rigors and temperature increasing. Patient experienced nausea, vomited green bilious fluid and had large light brown liquid diarrhea. Patient has increased urge to void however states he is unable to empty his bladder.

## 2020-09-09 NOTE — PROGRESS NOTE ADULT - SUBJECTIVE AND OBJECTIVE BOX
INTERVAL HPI/OVERNIGHT EVENTS:    SUBJECTIVE:   Patient seen and examined at bedside.     OBJECTIVE:    VITAL SIGNS:  ICU Vital Signs Last 24 Hrs  T(C): 38.1 (09 Sep 2020 12:00), Max: 39.4 (08 Sep 2020 19:54)  T(F): 100.6 (09 Sep 2020 12:00), Max: 102.9 (08 Sep 2020 19:54)  HR: 115 (09 Sep 2020 12:00) (90 - 120)  BP: 119/63 (09 Sep 2020 12:00) (63/40 - 142/72)  BP(mean): 82 (09 Sep 2020 12:00) (45 - 97)  ABP: --  ABP(mean): --  RR: 25 (09 Sep 2020 12:00) (16 - 37)  SpO2: 98% (09 Sep 2020 12:00) (90% - 100%)         @ 07:01  -   @ 07:00  --------------------------------------------------------  IN: 107.8 mL / OUT: 1125 mL / NET: -1017.2 mL     @ 07:01  -   @ 13:59  --------------------------------------------------------  IN: 0 mL / OUT: 1000 mL / NET: -1000 mL      CAPILLARY BLOOD GLUCOSE      POCT Blood Glucose.: 366 mg/dL (09 Sep 2020 11:20)      PHYSICAL EXAM:    PHYSICAL EXAM:    Constitutional: NAD. well-developed; well-groomed; well-nourished;  HEENT: AT/NC, PERRLA; EOMI, MMM, no oropharyngeal lesions, no erythema, no exudates, Supple neck; normal thyroid gland, no cervical lymphadenopathy  Respiratory: CTAB. equal aeration bilaterally. no wheezing, no crackes, no rhonchi. no increase in WOB  Cardiovascular: RRR, no M/R/G. 2+ distal pulses. Cap refill < 2 seconds. no JVD  Gastrointestinal: soft; NT/ND, +BS, no rebounding tenderness / guarding / HSM / mass / ascites.  Genitourinary: not examined.  Extremities: no clubbing; no cyanosis; no pedal edema, non-tender to palpation, DP and Radial pulses intact.  Skin: warm and dry; color normal: no rash: no ulcers  Neurological: A&Ox 3; responds to pain; responds to verbal commands; epicritic and protopathic sensation intact: CN nerves grossly intact.   MSK/Back: no deformities. Active and passive ROM intact; strength intact, no CVA tenderness, No joint tenderness, swelling, erythema  Psychiatric: normal mood/affect. Denies SI/HI    MEDICATIONS:  MEDICATIONS  (STANDING):  aspirin  chewable 81 milliGRAM(s) Oral daily  atorvastatin 80 milliGRAM(s) Oral at bedtime  cefTRIAXone   IVPB 1000 milliGRAM(s) IV Intermittent every 24 hours  chlorhexidine 4% Liquid 1 Application(s) Topical <User Schedule>  clopidogrel Tablet 75 milliGRAM(s) Oral daily  dextrose 5%. 1000 milliLiter(s) (50 mL/Hr) IV Continuous <Continuous>  dextrose 50% Injectable 12.5 Gram(s) IV Push once  dextrose 50% Injectable 25 Gram(s) IV Push once  dextrose 50% Injectable 25 Gram(s) IV Push once  influenza   Vaccine 0.5 milliLiter(s) IntraMuscular once  insulin glargine Injectable (LANTUS) 50 Unit(s) SubCutaneous at bedtime  insulin lispro (HumaLOG) corrective regimen sliding scale   SubCutaneous three times a day before meals  insulin lispro (HumaLOG) corrective regimen sliding scale   SubCutaneous at bedtime  insulin lispro Injectable (HumaLOG) 10 Unit(s) SubCutaneous three times a day before meals  loratadine 10 milliGRAM(s) Oral daily  metoprolol tartrate 25 milliGRAM(s) Oral two times a day  midodrine 10 milliGRAM(s) Oral every 8 hours  pantoprazole    Tablet 40 milliGRAM(s) Oral before breakfast  tamsulosin 0.4 milliGRAM(s) Oral at bedtime  vancomycin  IVPB 1500 milliGRAM(s) IV Intermittent once    MEDICATIONS  (PRN):  ALBUTerol    90 MICROgram(s) HFA Inhaler 2 Puff(s) Inhalation every 12 hours PRN Shortness of Breath and/or Wheezing  dextrose 40% Gel 15 Gram(s) Oral once PRN Blood Glucose LESS THAN 70 milliGRAM(s)/deciliter  glucagon  Injectable 1 milliGRAM(s) IntraMuscular once PRN Glucose LESS THAN 70 milligrams/deciliter      ALLERGIES:  Allergies    No Known Allergies    Intolerances        LABS:                        12.8   11.16 )-----------( 116      ( 09 Sep 2020 04:33 )             39.4     Hemoglobin: 12.8 g/dL ( @ 04:33)  Hemoglobin: 13.1 g/dL ( @ 20:42)    CBC Full  -  ( 09 Sep 2020 04:33 )  WBC Count : 11.16 K/uL  RBC Count : 4.94 M/uL  Hemoglobin : 12.8 g/dL  Hematocrit : 39.4 %  Platelet Count - Automated : 116 K/uL  Mean Cell Volume : 79.8 fl  Mean Cell Hemoglobin : 25.9 pg  Mean Cell Hemoglobin Concentration : 32.5 gm/dL  Auto Neutrophil # : 9.25 K/uL  Auto Lymphocyte # : 0.75 K/uL  Auto Monocyte # : 1.02 K/uL  Auto Eosinophil # : 0.00 K/uL  Auto Basophil # : 0.03 K/uL  Auto Neutrophil % : 82.9 %  Auto Lymphocyte % : 6.7 %  Auto Monocyte % : 9.1 %  Auto Eosinophil % : 0.0 %  Auto Basophil % : 0.3 %        132<L>  |  94<L>  |  38<H>  ----------------------------<  382<H>  3.5   |  20<L>  |  2.07<H>    Ca    8.6      09 Sep 2020 04:36  Phos  3.6       Mg     1.6         TPro  7.0  /  Alb  3.9  /  TBili  0.7  /  DBili  x   /  AST  41<H>  /  ALT  41  /  AlkPhos  66      Creatinine Trend: 2.07<--, 1.88<--  LIVER FUNCTIONS - ( 09 Sep 2020 04:36 )  Alb: 3.9 g/dL / Pro: 7.0 g/dL / ALK PHOS: 66 U/L / ALT: 41 U/L / AST: 41 U/L / GGT: x           PT/INR - ( 09 Sep 2020 04:33 )   PT: 13.8 sec;   INR: 1.17 ratio         PTT - ( 09 Sep 2020 04:33 )  PTT:30.0 sec    hs Troponin:    ABG - ( 09 Sep 2020 05:40 )  pH, Arterial: 7.45  pH, Blood: x     /  pCO2: 35    /  pO2: 42    / HCO3: 24    / Base Excess: .6    /  SaO2: 73                  05:40 - ABG - pH: 7.45  |  pCO2: 35    |  pO2: 42    | Lactate:       | BE: .6     20:42 - VBG - pH:       | pCO2:       | pO2:       | Lactate: 1.8        Urinalysis Basic - ( 09 Sep 2020 07:23 )    Color: Light Yellow / Appearance: Clear / S.014 / pH: x  Gluc: x / Ketone: Negative  / Bili: Negative / Urobili: Negative   Blood: x / Protein: Trace / Nitrite: Negative   Leuk Esterase: Negative / RBC: 2 /hpf / WBC 0 /HPF   Sq Epi: x / Non Sq Epi: 0 /hpf / Bacteria: Negative      CSF:                      EKG:   MICROBIOLOGY:    GI PCR Panel, Stool (collected 09 Sep 2020 10:21)  Source: .Stool Feces sarina garcia  Final Report (09 Sep 2020 12:58):    GI PCR Results: NOT detected    *******Please Note:*******    GI panel PCR evaluates for:    Campylobacter, Plesiomonas shigelloides, Salmonella,    Vibrio, Yersinia enterocolitica, Enteroaggregative    Escherichia coli (EAEC), Enteropathogenic E.coli (EPEC),    Enterotoxigenic E. coli (ETEC) lt/st, Shiga-like    toxin-producing E. coli (STEC) stx1/stx2,    Shigella/ Enteroinvasive E. coli (EIEC), Cryptosporidium,    Cyclospora cayetanensis, Entamoeba histolytica,    Giardia lamblia, Adenovirus F 40/41, Astrovirus,    Norovirus GI/GII, Rotavirus A, Sapovirus    Culture - Blood (collected 09 Sep 2020 00:57)  Source: .Blood Blood-Peripheral  Gram Stain (09 Sep 2020 13:15):    Growth in aerobic and anaerobic bottles: Gram Positive Cocci in Pairs and    Chains  Preliminary Report (09 Sep 2020 13:16):    Growth in aerobic and anaerobic bottles: Gram Positive Cocci in Pairs and    Chains    Culture - Blood (collected 09 Sep 2020 00:57)  Source: .Blood Blood-Peripheral  Gram Stain (09 Sep 2020 13:14):    Growth in anaerobic bottle: Gram Positive Cocci in Pairs and Chains    Growth in aerobic bottle: Gram Positive Cocci in Pairs and Chains  Preliminary Report (09 Sep 2020 13:15):    Growth in anaerobic bottle: Gram Positive Cocci in Pairs and Chains    Growth in aerobic bottle: Gram Positive Cocci in Pairs and Chains    "Due to technical problems, Proteus sp. will Not be reported as part of    the BCID panel until further notice"    ***Blood Panel PCR results on this specimen are available    approximately 3 hours after the Gram stain result.***    Gram stain, PCR, and/or culture results may not always    correspond due to difference in methodologies.    ************************************************************    This PCR assay was performed using [employer].    The following targets are tested for: Enterococcus,    vancomycin resistant enterococci, Listeria monocytogenes,    coagulase negative staphylococci, S. aureus,    methicillin resistant S. aureus, Streptococcus agalactiae    (Group B), S. pneumoniae, S. pyogenes (Group A),    Acinetobacter baumannii, Enterobacter cloacae, E. coli,    Klebsiella oxytoca, K. pneumoniae, Proteus sp.,    Serratia marcescens, Haemophilus influenzae,    Neisseria meningitidis, Pseudomonas aeruginosa, Candida    albicans, C. glabrata, C krusei, C parapsilosis,    C. tropicalis and the KPC resistance gene.  Organism: Blood Culture PCR (09 Sep 2020 13:25)  Organism: Blood Culture PCR (09 Sep 2020 13:25)      IMAGING:      Labs, imaging, EKG personally reviewed    RADIOLOGY & ADDITIONAL TESTS: Reviewed.

## 2020-09-09 NOTE — H&P ADULT - HISTORY OF PRESENT ILLNESS
IN PROGRESS    59M PMH T2DM, HTN, CAD/MI s/p stents (most recent 2/2018), HFrEF (10-15%), ICD,  ischemic cardiomyopathy, COPD, HTN, GERD pw fever and cough x 2 days w/ one episode of emesis today at physician office, who told him to present to ED. Denies sick contact, recent travel,  diarrhea, dysuria.     ED Course:  VS: febrile 102.9, HR , BP 63/40 to 105/62, RR 17-25, SpO2%  on RA  Labs: significant for WBC 10.28, thrombocytopenia 146, elevated BUN/SCr 32/1.88, hyperglycemic 247, elevated proBNP 1158, lactate wnl  Micro: COVID-19 negative, RVP negative, UA not concerning for infxn  Imaging: CXR: clr lungs (prelim read)  Orders: received po tylenol 975mg, po motrin 600mg, IV azithromycin 500mg, IV ceftriaxone 1000mg. Midodrine 10mg x 1 received and ordered for 10mg q8h. Started on levophed. Received 500 cc bolus of LR and 500 cc bolus of NS. Urine and Blood Cx sent, CT A/P, chest, non contrast ordered.     Pt admitted to MICU for management of hypotension requiring pressor support 59M PMH T2DM, HTN, CAD/MI s/p stents (most recent 2/2018), HFrEF (10-15%), ICD,  ischemic cardiomyopathy, COPD, HTN, GERD pw fever, n/v, back pain x 1 day. Pt states that he was in his baseline state of health when he began to experience back pain while grocery shopping yesterday. He describes sudden onset left lower back pain, palliated by sitting upright, of sharp quality, without radiation, of 7/10 severity, which completely resolved when presenting to ED. After returning home from shopping he experienced chills but did not measure his temperature. After drinking 1 bottle of water he felt nauseous and had an episode of NBNB emesis, with resolved nausea afterward. In addition he reports cough without sputum production. He denies HA, weakness, confusion, sinus congestion, CP, abd pain, dysuria, hematuria, diarrhea, or myalgias.     ED Course:  VS: febrile 102.9, HR , BP 63/40 to 105/62, RR 17-25, SpO2%  on RA  Labs: significant for WBC 10.28, thrombocytopenia 146, elevated BUN/SCr 32/1.88, hyperglycemic 247, elevated proBNP 1158, lactate wnl  Micro: COVID-19 negative, RVP negative, UA not concerning for infxn  Imaging: CXR: clr lungs (prelim read)  Orders: received po tylenol 975mg, po motrin 600mg, IV azithromycin 500mg, IV ceftriaxone 1000mg. Midodrine 10mg x 1 received and ordered for 10mg q8h. Started on levophed. Received 500 cc bolus of LR and 500 cc bolus of NS. Urine and Blood Cx sent, CT A/P, chest, non contrast ordered.     Pt admitted to MICU for management of hypotension requiring pressor support 59M PMH T2DM, HTN, CAD/MI s/p stents (most recent 2/2018), HFrEF (10-15%), ICD,  ischemic cardiomyopathy, COPD, HTN, GERD pw fever, n/v, back pain x 1 day. Pt states that he was in his baseline state of health when he began to experience back pain while grocery shopping yesterday. He describes sudden onset left lower back pain, palliated by sitting upright, of sharp quality, without radiation, of 7/10 severity, which completely resolved when presenting to ED. After returning home from shopping he experienced chills but did not measure his temperature. After drinking 1 bottle of water he felt nauseous and had an episode of NBNB emesis, with resolved nausea afterward. He reports one instance of cloudy/dark urine while admitted, but denies dysuria, hematuria, change in frequency or foul odor. In addition he reports cough without sputum production. He denies HA, weakness, confusion, sinus congestion, CP, abd pain, dysuria, hematuria, diarrhea, or myalgias.     ED Course:  VS: febrile 102.9, HR , BP 63/40 to 105/62, RR 17-25, SpO2%  on RA  Labs: significant for WBC 10.28, thrombocytopenia 146, elevated BUN/SCr 32/1.88, hyperglycemic 247, elevated proBNP 1158, lactate wnl  Micro: COVID-19 negative, RVP negative, UA not concerning for infxn  Imaging: CXR: clr lungs (prelim read)  Orders: received po tylenol 975mg, po motrin 600mg, IV azithromycin 500mg, IV ceftriaxone 1000mg. Midodrine 10mg x 1 received and ordered for 10mg q8h. Started on levophed. Received 500 cc bolus of LR and 500 cc bolus of NS. Urine and Blood Cx sent, CT A/P, chest, non contrast ordered.     Pt admitted to MICU for management of hypotension requiring pressor support 59M PMH T2DM, HTN, CAD/MI s/p stents (most recent 2/2018), HFrEF (10-15%), ICD,  ischemic cardiomyopathy, COPD, HTN, GERD pw fever, n/v, back pain x 1 day. Pt states that he was in his baseline state of health when he began to experience back pain while grocery shopping yesterday. He describes sudden onset left lower back pain, palliated by sitting upright, of sharp quality, without radiation, of 7/10 severity, which completely resolved when presenting to ED. After returning home from shopping he experienced chills but did not measure his temperature. After drinking 1 bottle of water he felt nauseous and had an episode of NBNB emesis, with resolved nausea afterward. He reports one instance of cloudy/dark urine while admitted, but denies dysuria, hematuria, change in frequency or foul odor. In addition he reports cough without sputum production. He denies HA, weakness, confusion, sinus congestion, CP, abd pain, dysuria, hematuria, diarrhea, or myalgias.     ED Course:  VS: febrile 102.9, HR , BP 63/40 to 105/62, RR 17-25, SpO2%  on RA  Labs: significant for WBC 10.28, thrombocytopenia 146, elevated BUN/SCr 32/1.88, hyperglycemic 247, elevated proBNP 1158, lactate wnl  Micro: COVID-19 negative, RVP negative, UA not concerning for infxn  Imaging: CXR: clr lungs (prelim read)  Orders: received po tylenol 975mg, po motrin 600mg, IV azithromycin 500mg, IV ceftriaxone 1000mg. Midodrine 10mg x 1 received and ordered for 10mg q8h. Started on levophed. Received 500 cc bolus of LR and 500 cc bolus of NS. Urine and Blood Cx sent, CT A/P, chest, non contrast ordered.     Pt admitted to MICU for management of hypotension requiring pressor support. While in ICU experienced emesis and diarrhea (without melena/hematochezia) x 1 with SOB.

## 2020-09-09 NOTE — H&P ADULT - NSICDXFAMILYHX_GEN_ALL_CORE_FT
FAMILY HISTORY:  Family history of cardiac disorder, Paternal    Sibling  Still living? Unknown  Family history of COPD (chronic obstructive pulmonary disease), Age at diagnosis: Age Unknown

## 2020-09-09 NOTE — CONSULT NOTE ADULT - SUBJECTIVE AND OBJECTIVE BOX
Patient seen and evaluated @ 9am in MICU  Chief Complaint: nausea and vomiting    HPI:  59M PMH T2DM, HTN, CAD/MI s/p stents (most recent 2/2018), HFrEF (10-15%), ICD,  ischemic cardiomyopathy, COPD, HTN, GERD pw fever, n/v, back pain x 1 day. Pt states that he was in his baseline state of health when he began to experience back pain while grocery shopping yesterday. He describes sudden onset left lower back pain, palliated by sitting upright, of sharp quality, without radiation, of 7/10 severity, which completely resolved when presenting to ED. After returning home from shopping he experienced chills but did not measure his temperature. After drinking 1 bottle of water he felt nauseous and had an episode of NBNB emesis, with resolved nausea afterward. He reports one instance of cloudy/dark urine while admitted, but denies dysuria, hematuria, change in frequency or foul odor. In addition he reports cough without sputum production. He denies HA, weakness, confusion, sinus congestion, CP, abd pain, dysuria, hematuria, diarrhea, or myalgias.     ED Course:  VS: febrile 102.9, HR , BP 63/40 to 105/62, RR 17-25, SpO2%  on RA  Labs: significant for WBC 10.28, thrombocytopenia 146, elevated BUN/SCr 32/1.88, hyperglycemic 247, elevated proBNP 1158, lactate wnl  Micro: COVID-19 negative, RVP negative, UA not concerning for infxn  Imaging: CXR: clr lungs (prelim read)  Orders: received po tylenol 975mg, po motrin 600mg, IV azithromycin 500mg, IV ceftriaxone 1000mg. Midodrine 10mg x 1 received and ordered for 10mg q8h. Started on levophed. Received 500 cc bolus of LR and 500 cc bolus of NS. Urine and Blood Cx sent, CT A/P, chest, non contrast ordered.     Pt admitted to MICU for management of hypotension requiring pressor support. While in ICU experienced emesis and diarrhea (without melena/hematochezia) x 1 with SOB. (09 Sep 2020 02:30)    PMH:   H/O gastroesophageal reflux (GERD)  History of COPD  History of ischemic cardiomyopathy  Heart failure with reduced ejection fraction  Hypertension  AICD (automatic cardioverter/defibrillator) present  Diabetes  Stented coronary artery    PSH:   H/O vasectomy  No significant past surgical history    Medications:   ALBUTerol    90 MICROgram(s) HFA Inhaler 2 Puff(s) Inhalation every 12 hours PRN  aspirin  chewable 81 milliGRAM(s) Oral daily  atorvastatin 80 milliGRAM(s) Oral at bedtime  cefTRIAXone   IVPB 1000 milliGRAM(s) IV Intermittent every 24 hours  chlorhexidine 4% Liquid 1 Application(s) Topical <User Schedule>  clopidogrel Tablet 75 milliGRAM(s) Oral daily  dextrose 40% Gel 15 Gram(s) Oral once PRN  dextrose 5%. 1000 milliLiter(s) IV Continuous <Continuous>  dextrose 50% Injectable 12.5 Gram(s) IV Push once  dextrose 50% Injectable 25 Gram(s) IV Push once  dextrose 50% Injectable 25 Gram(s) IV Push once  glucagon  Injectable 1 milliGRAM(s) IntraMuscular once PRN  influenza   Vaccine 0.5 milliLiter(s) IntraMuscular once  insulin glargine Injectable (LANTUS) 50 Unit(s) SubCutaneous at bedtime  insulin lispro (HumaLOG) corrective regimen sliding scale   SubCutaneous three times a day before meals  insulin lispro (HumaLOG) corrective regimen sliding scale   SubCutaneous at bedtime  loratadine 10 milliGRAM(s) Oral daily  metoprolol tartrate 25 milliGRAM(s) Oral two times a day  midodrine 10 milliGRAM(s) Oral every 8 hours  pantoprazole    Tablet 40 milliGRAM(s) Oral before breakfast  tamsulosin 0.4 milliGRAM(s) Oral at bedtime    Allergies:  No Known Allergies    FAMILY HISTORY:  Family history of cardiac disorder: Paternal  Family history of COPD (chronic obstructive pulmonary disease) (Sibling)    Social History: , lives with wife, retired   Smoking: former heavy smoker  Alcohol: no EtOH abuse  Drugs: no illicit drug use    Review of Systems:    Constitutional: No fever, chills, fatigue, or changes in weight  HEENT: No blurry vision, eye pain, headache, runny nose, or sore throat  Respiratory: No shortness of breath, cough, or wheezing  Cardiovascular: No chest pain or palpitations  Gastrointestinal: No nausea, vomiting, diarrhea, or abdominal pain  Genitourinary: No dysuria or incontinence  Extremities: No lower extremity swelling  Neurologic: No focal findings  Lymphatic: No lymphadenopathy   Skin: No rash  Psychiatry: No anxiety or depression  10 point review of systems is otherwise negative except as mentioned above            Physical Exam:  T(C): 38.1 (09-09-20 @ 12:00), Max: 39.4 (09-08-20 @ 19:54)  HR: 115 (09-09-20 @ 12:00) (90 - 120)  BP: 119/63 (09-09-20 @ 12:00) (63/40 - 142/72)  RR: 25 (09-09-20 @ 12:00) (16 - 37)  SpO2: 98% (09-09-20 @ 12:00) (90% - 100%)  Wt(kg): --    09-08 @ 07:01  -  09-09 @ 07:00  --------------------------------------------------------  IN: 107.8 mL / OUT: 1125 mL / NET: -1017.2 mL    09-09 @ 07:01  -  09-09 @ 12:43  --------------------------------------------------------  IN: 0 mL / OUT: 1000 mL / NET: -1000 mL      Daily Height in cm: 195.58 (09 Sep 2020 01:40)    Daily     Appearance: Normal, well groomed, NAD  Eyes: PERRLA, EOMI, pink conjunctiva, no scleral icterus   HENT: Normal oral mucosa  Cardiovascular: RRR, S1, S2, no murmur, rub, or gallop; +edema; +JVD  Respiratory: diminished breath sounds throughout  Gastrointestinal: Soft, non-tender, non-distended, BS+  Musculoskeletal: No clubbing or joint deformity   Neurologic: No focal weakness  Lymphatic: No lymphadenopathy  Psychiatry: AAOx3 with appropriate mood and affect  Skin: No rashes, ecchymoses, or cyanosis; +tattoos     Cardiovascular Diagnostic Testing:  ECG: sinus tach, 100 bpm, normal axis, poor R-wave progression    Echo: < from: TTE with Doppler (w/Cont) (02.08.18 @ 06:06) >  ------------------------------------------------------------------------  Dimensions:    Normal Values:  LA:     4.0    2.0 - 4.0 cm  Ao:     3.5    2.0 - 3.8 cm  SEPTUM: 1.0    0.6 - 1.2 cm  PWT:    1.2    0.6 - 1.1 cm  LVIDd:  6.5    3.0 - 5.6 cm  LVIDs:  5.3    1.8 - 4.0 cm  Derived variables:  LVMI: 132 g/m2  RWT: 0.36  EF (Visual Estimate): 20-25 %  ------------------------------------------------------------------------  Observations:  Mitral Valve: Grossly normal mitral valve. Minimal mitral  regurgitation.  Aortic Valve/Aorta: Trileaflet aortic valve. Mean  transaortic valve gradient equals 2 mm Hg, aortic valve  velocity time integral equals 20 cm. No aortic valve  regurgitation seen. Mean gradient is equal to 1 mm Hg, LVOT  velocity time integral equals 11 cm.  Normal aortic root size. (Ao: 3.5 cm at the sinuses of  Valsalva).  Left Atrium: Left atrium not well visualized.  Left Ventricle: Endocardial visualization enhanced with  intravenous injection ofecho contrast (Definity).  Severe  segmental left ventricular systolic dysfunction. The  anteroseptal, inferoseptal, anterior walls and all apical  segments are akinetic.  No left ventricular thrombus.  Swirling of contrast suggests low flow.  Moderate left  ventricular enlargement. Mild diastolic dysfunction (Stage  I).  Right Heart: Normal right atrium. The right ventricle is  not well visualized; grossly normal right ventricular  systolic function. TAPSE > 2.0 cm (normal).  Tricuspid  valve not well visualized. Pulmonic valve not well  visualized, probably normal.  Pericardium/Pleura: Trace pericardial effusion.  Hemodynamic: Estimated right atrial pressure is 8 mm Hg.  Inadequate tricuspid regurgitation Doppler envelope  precludes estimation of RVSP.  ------------------------------------------------------------------------  Conclusions:  1. Endocardial visualization enhanced with intravenous  injection of echo contrast (Definity).  Severe segmental  left ventricular systolic dysfunction. The anteroseptal,  inferoseptal, anterior walls and all apical segments are  akinetic.  No left ventricular thrombus. Swirling of  contrast suggests low flow.  2. Mild diastolic dysfunction (Stage I).  3. Trace pericardial effusion.  *** No previous Echo exam. Technically difficult (portable)  study.  ------------------------------------------------------------------------  Revised on 2/8/2018 - 9:14 AM by Major Remy M.D.  ------------------------------------------------------------------------    < end of copied text >    Cath: < from: Cardiac Cath Lab - Adult (02.05.18 @ 16:05) >  VENTRICLES: Analysis of regional contractile function demonstrated severe  anterolateral hypokinesis, apical dyskinesis, and diaphragmatic  dyskinesis. EF estimated was 25 %.  CORONARY VESSELS: The coronary circulation is right dominant.  LM:   --  LM: Angiography showed minor luminal irregularities with no flow  limiting lesions.  LAD:   --  Ostial LAD: There was a 100 % stenosis.  CX:   --  Mid circumflex: There was a 30 % stenosis.  RI:   --  Ramus intermedius: There was a 100 % stenosis.  RCA:   --  RCA: Angiography showed minor luminal irregularities with no  flow limiting lesions.  COMPLICATIONS: There were no complications. No complications occurred  during the cath lab visit.  DIAGNOSTIC RECOMMENDATIONS: ASA and Plavix for 1 year.  INTERVENTIONAL RECOMMENDATIONS: ASA and Plavix for 1 year.  Prepared and signed by  Stefan Mireles M.D.  Signed 02/05/2018 17:53:51    < end of copied text >    Interpretation of Telemetry: NSR  Labs:                        12.8   11.16 )-----------( 116      ( 09 Sep 2020 04:33 )             39.4     09-09    132<L>  |  94<L>  |  38<H>  ----------------------------<  382<H>  3.5   |  20<L>  |  2.07<H>    Ca    8.6      09 Sep 2020 04:36  Phos  3.6     09-09  Mg     1.6     09-09    TPro  7.0  /  Alb  3.9  /  TBili  0.7  /  DBili  x   /  AST  41<H>  /  ALT  41  /  AlkPhos  66  09-09    PT/INR - ( 09 Sep 2020 04:33 )   PT: 13.8 sec;   INR: 1.17 ratio         PTT - ( 09 Sep 2020 04:33 )  PTT:30.0 sec      Serum Pro-Brain Natriuretic Peptide: 1158 pg/mL (09-08 @ 20:42)        Thyroid Stimulating Hormone, Serum: 1.74 uIU/mL (09-09 @ 07:25)

## 2020-09-09 NOTE — H&P ADULT - NSICDXPASTMEDICALHX_GEN_ALL_CORE_FT
PAST MEDICAL HISTORY:  AICD (automatic cardioverter/defibrillator) present     Diabetes     H/O gastroesophageal reflux (GERD)     Heart failure with reduced ejection fraction     History of COPD     History of ischemic cardiomyopathy     Hypertension     Stented coronary artery

## 2020-09-09 NOTE — PROVIDER CONTACT NOTE (CHANGE IN STATUS NOTIFICATION) - RECOMMENDATIONS
Send ABG. Chest x-Ray. Insert tinoco (appropriate intervention to increase in BUN and Cr and patients dyspnea due to fluid overload). Send labs. NPO. give lasix

## 2020-09-09 NOTE — CONSULT NOTE ADULT - ASSESSMENT
Assessment  DMT2: 59y Male with DM T2 with hyperglycemia, A1C pending, was on high-dose basal bolus insulin at home, now on basal insulin plus coverage, primary team increased scheduled Lantus dose, blood sugars running high and not at target, no hypoglycemic episodes. Patient did not eat yesterday 2/2 N/V, now starting to eat meals, appears alert and comfortable.  Septic Shock: Hydronephrosis, On IV ABx, stable, monitored.  Obesity: No strict exercise routines, not on any weight loss program, neither on low calorie diet.          Cortney Flanagan MD  Cell: [phone]  Office: [phone] Assessment  DMT2: 59y Male with DM T2 with hyperglycemia, A1C pending, was on high-dose basal bolus insulin at home,  now on basal insulin plus coverage, primary team increased scheduled Lantus dose, blood sugars running high and not at target, no hypoglycemic episodes. Patient did not eat yesterday 2/2 N/V, now starting to eat meals, appears alert and comfortable.  Septic Shock: Hydronephrosis, On IV ABx, stable, monitored.  Obesity: No strict exercise routines, not on any weight loss program, neither on low calorie diet.          Cortney Flanagan MD  Cell: [phone]  Office: [phone]

## 2020-09-09 NOTE — CONSULT NOTE ADULT - ASSESSMENT
ASSESSMENT/RECOMMENDATIONS:  59M PMH T2DM(a1c=11.2%), HTN, CAD/MI s/p stents (most recent 2/2018), HFrEF (10-15%), ICD, ischemic cardiomyopathy, COPD, HTN, GERD pw fever, n/v, back pain x 1 day. Pt states that he was in his baseline state of health when he began to experience back pain while grocery shopping yesterday. He describes sudden onset left lower back pain, palliated by sitting upright, of sharp quality, without radiation, of 7/10 severity, which completely resolved when presenting to ED.     VS: febrile 102.9, HR , BP 63/40 to 105/62, RR 17-25, SpO2%  on RA  Labs: significant for WBC 10.28, thrombocytopenia 146, elevated BUN/SCr 32/1.88, hyperglycemic 247, elevated proBNP 1158, lactate wnl  Micro: COVID-19 negative, RVP negative, UA 0 WBC and negative for bacteria. GI PCR negative. BCx: Strep by PCR.  CT: No pneumonia. Emphysema. No bowel or obstruction. Hepatomegaly and diffuse hepatic steatosis. Splenomegaly. Atrophic right kidney, with severe hydronephrosis. A 0.7 cm soft tissue density is noted in the interpolar region of the right kidney and is incompletely characterized. Ultrasound may be helpful for further evaluation.  Pt admitted to MICU for management of hypotension requiring pressor support. While in ICU experienced emesis and diarrhea (without melena/hematochezia) x 1 with SOB.     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  EP consulted for ICD explant.  ID consulted for Streptococcal bacteremia.        #PENDING RECS. PLEASE WAIT FOR FINAL RECS AFTER DISCUSSION WITH ATTENDING#    Paco Hernandez MD, PGY4  Fellow, Infectious Diseases  Pager: [phone]  If no response, after 5pm and on Weekends: Call [phone] ASSESSMENT/RECOMMENDATIONS:  59M PMH T2DM(a1c=11.2%), HTN, CAD/MI s/p stents (most recent 2/2018), HFrEF (10-15%), ICD, ischemic cardiomyopathy, COPD, HTN, GERD pw fever, n/v, back pain x 1 day. Pt states that he was in his baseline state of health when he began to experience back pain while grocery shopping yesterday. He describes sudden onset left lower back pain, palliated by sitting upright, of sharp quality, without radiation, of 7/10 severity, which completely resolved when presenting to ED.     VS: febrile 102.9, HR , BP 63/40 to 105/62, RR 17-25, SpO2%  on RA  Labs: significant for WBC 10.28, thrombocytopenia 146, elevated BUN/SCr 32/1.88, hyperglycemic 247, elevated proBNP 1158, lactate wnl  Micro: COVID-19 negative, RVP negative, UA 0 WBC and negative for bacteria. GI PCR negative. BCx: Strep by PCR.  CT: No pneumonia. Emphysema. No bowel or obstruction. Hepatomegaly and diffuse hepatic steatosis. Splenomegaly. Atrophic right kidney, with severe hydronephrosis. A 0.7 cm soft tissue density is noted in the interpolar region of the right kidney and is incompletely characterized. Ultrasound may be helpful for further evaluation.  Pt admitted to MICU for management of hypotension requiring pressor support. While in ICU experienced emesis and diarrhea (without melena/hematochezia) x 1 with SOB.     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  EP consulted for ICD explant.  ID consulted for Streptococcal bacteremia.    #Streptococcal bacteremia:  - Source unknown  - EP         #PENDING RECS. PLEASE WAIT FOR FINAL RECS AFTER DISCUSSION WITH ATTENDING#    Paco Hernandez MD, PGY4  Fellow, Infectious Diseases  Pager: [phone]  If no response, after 5pm and on Weekends: Call [phone] ASSESSMENT/RECOMMENDATIONS:  59M PMH T2DM(a1c=11.2%), HTN, CAD/MI s/p stents (most recent 2/2018), HFrEF (10-15%), ICD, ischemic cardiomyopathy, COPD, HTN, GERD pw fever, n/v, back pain x 1 day. Pt states that he was in his baseline state of health when he began to experience back pain while grocery shopping yesterday. He describes sudden onset left lower back pain, palliated by sitting upright, of sharp quality, without radiation, of 7/10 severity, which completely resolved when presenting to ED.     VS: febrile 102.9, HR , BP 63/40 to 105/62, RR 17-25, SpO2%  on RA  Labs: significant for WBC 10.28, thrombocytopenia 146, elevated BUN/SCr 32/1.88, hyperglycemic 247, elevated proBNP 1158, lactate wnl  Micro: COVID-19 negative, RVP negative, UA 0 WBC and negative for bacteria. GI PCR negative. BCx: Strep by PCR.  CT: No pneumonia. Emphysema. No bowel or obstruction. Hepatomegaly and diffuse hepatic steatosis. Splenomegaly. Atrophic right kidney, with severe hydronephrosis. A 0.7 cm soft tissue density is noted in the interpolar region of the right kidney and is incompletely characterized. Ultrasound may be helpful for further evaluation.  Pt admitted to MICU for management of hypotension requiring pressor support. While in ICU experienced emesis and diarrhea (without melena/hematochezia) x 1 with SOB.     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  Pt looks well, only complaint is feeling tired. No more back pain.  He denies any recent dental work. No pain right now.  He c/o ICD site "muscle pain" on and off since 1 month ago. Today there is no pain when pressed on the ICD.  Pt's daughter also has diarrhea over the weekend.    #Streptococcus detected in the blood by PCR:  - Source unknown. GI? vs contaminant vs dental(grossly exam teeth looks clean)  - Still could be a contaminant, so better to have DONNIE first before removing ICD  - Follow up final BCx result and the repeat BCx  - For now, keep abx on, will do vancomycin by level and ceftriaxone 2g q24h IV  - Dental consult to r/o dental origin of infection    Paco Hernandez MD, PGY4  Fellow, Infectious Diseases  Pager: [phone]  If no response, after 5pm and on Weekends: Call [phone]    d/w Dr. Rodríguez  Recs conveyed to primary team ASSESSMENT/RECOMMENDATIONS:  59M PMH T2DM(a1c=11.2%), HTN, CAD/MI s/p stents (most recent 2/2018), HFrEF (10-15%), ICD, ischemic cardiomyopathy, COPD, HTN, GERD pw fever, n/v, back pain x 1 day. Pt states that he was in his baseline state of health when he began to experience back pain while grocery shopping yesterday. He describes sudden onset left lower back pain, palliated by sitting upright, of sharp quality, without radiation, of 7/10 severity, which completely resolved when presenting to ED.     VS: febrile 102.9, HR , BP 63/40 to 105/62, RR 17-25, SpO2%  on RA  Labs: significant for WBC 10.28, thrombocytopenia 146, elevated BUN/SCr 32/1.88, hyperglycemic 247, elevated proBNP 1158, lactate wnl  Micro: COVID-19 negative, RVP negative, UA 0 WBC and negative for bacteria. GI PCR negative. BCx: Strep by PCR.  CT: No pneumonia. Emphysema. No bowel or obstruction. Hepatomegaly and diffuse hepatic steatosis. Splenomegaly. Atrophic right kidney, with severe hydronephrosis. A 0.7 cm soft tissue density is noted in the interpolar region of the right kidney and is incompletely characterized. Ultrasound may be helpful for further evaluation.  Pt admitted to MICU for management of hypotension requiring pressor support. While in ICU experienced emesis and diarrhea (without melena/hematochezia) x 1 with SOB.     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  Pt looks well, only complaint is feeling tired. No more back pain.  He denies any recent dental work. No pain right now.  He c/o ICD site "muscle pain" on and off since 1 month ago. Today there is no pain when pressed on the ICD.  Pt's daughter also has diarrhea over the weekend.    #Streptococcus detected in the blood by PCR:  - Source unknown. GI? vs contaminant vs dental(grossly exam teeth looks clean)  -  4/4 positive cultures, ? two separate sticks. ? seeded AICD  - suggest DONNIE  - Follow up final BCx result and the repeat BCx  - For now, keep abx on, will do vancomycin by level and ceftriaxone 2g q24h IV  - Dental consult to r/o dental origin of infection    Paco Hernandez MD, PGY4  Fellow, Infectious Diseases  Pager: [phone]  If no response, after 5pm and on Weekends: Call [phone]    d/w Dr. Rodríguez  Recs conveyed to primary team

## 2020-09-09 NOTE — CONSULT NOTE ADULT - ASSESSMENT
Patient is a 59 year-old with known ischemic cardiomyopathy status post ICD ([employer], implanted in January 2012), presents with GI complaints, hypotension, found to have sepsis secondary to gram positive bacteremia of unclear source.  Patient now off IV pressor support.  Wean midodrine as tolerated.    Renal consultation for acute on chronic renal insufficiency - baseline creatinine 1.5 mg/dL.  ID consult for bacteremia.  EP consult to evaluate risks/benefits of possible ICD explant in patient with gram positive bacteremia. Patient will likely require DONNIE.    Will slowly restart beta-blocker and Entresto as hemodynamics and renal function permit once patient is off midodrine.

## 2020-09-09 NOTE — CONSULT NOTE ADULT - ASSESSMENT
59M PMH T2DM, HTN, CAD/MI s/p stents (most recent 2/2018), ICM w/ HFrEF (10-15%) s/p primary prevention STJ VVI ICD, COPD (not on home O2), HTN, GERD, BPH, who was admitted for sepsis 2/2 gram positive bacteremia.  Admitted to MICU for hypotension, requiring pressors. EP consulted for ICD extraction.    STJ ICD Fortify VR 1231-40Q ICD: implanted 1/282012   Durata 7121Q/65cm: Implanted 1/28/2012     1. ICM EF 10-15%  2. Streptococcus Bacteremia     - NPO after midnight for ICD extraction tomorrow   - Will need current type and screen  - Hold AC  - On interrogation 9/9: <1% Vpaced, no history of tachy therapy   - D/w MICU and EP attending MD Rodríguez      [phone]

## 2020-09-09 NOTE — H&P ADULT - NSHPREVIEWOFSYSTEMS_GEN_ALL_CORE
REVIEW OF SYSTEMS  CONSTITUTIONAL: +CHILLS  EYES: No visual disturbances  ENMT: No sinus or throat pain  RESPIRATORY: No shortness of breath +DRY COUGH  CARDIOVASCULAR: No chest pain  GASTROINTESTINAL: No abdominal or epigastric pain. No hematemesis; No diarrhea or constipation. No melena or hematochezia. +N/V  GENITOURINARY: No dysuria, frequency or hematuria. +DARK/CLOUDY URINE  NEUROLOGICAL: No headaches, loss of strength or numbness  SKIN: No rashes  MUSCULOSKELETAL: +LOWER LEFT BACK PAIN REVIEW OF SYSTEMS  CONSTITUTIONAL: +CHILLS  EYES: No visual disturbances  ENMT: No sinus or throat pain  RESPIRATORY: No shortness of breath +DRY COUGH  CARDIOVASCULAR: No chest pain  GASTROINTESTINAL: No abdominal or epigastric pain. No hematemesis; No constipation. No melena or hematochezia. +N/V, DIARRHEA  GENITOURINARY: No dysuria, frequency or hematuria. +DARK/CLOUDY URINE  NEUROLOGICAL: No headaches, loss of strength or numbness  SKIN: No rashes  MUSCULOSKELETAL: +LOWER LEFT BACK PAIN

## 2020-09-09 NOTE — H&P ADULT - NSHPPHYSICALEXAM_GEN_ALL_CORE
Vital Signs Last 24 Hrs  T(C): 37.2 (09 Sep 2020 01:40), Max: 39.4 (08 Sep 2020 19:54)  T(F): 99 (09 Sep 2020 01:40), Max: 102.9 (08 Sep 2020 19:54)  HR: 96 (09 Sep 2020 02:00) (90 - 119)  BP: 106/68 (09 Sep 2020 02:00) (63/40 - 115/74)  BP(mean): 82 (09 Sep 2020 02:00) (45 - 89)  RR: 16 (09 Sep 2020 02:00) (16 - 25)  SpO2: 98% (09 Sep 2020 02:00) (95% - 100%) Vital Signs Last 24 Hrs  T(C): 37.2 (09 Sep 2020 01:40), Max: 39.4 (08 Sep 2020 19:54)  T(F): 99 (09 Sep 2020 01:40), Max: 102.9 (08 Sep 2020 19:54)  HR: 96 (09 Sep 2020 02:00) (90 - 119)  BP: 106/68 (09 Sep 2020 02:00) (63/40 - 115/74)  BP(mean): 82 (09 Sep 2020 02:00) (45 - 89)  RR: 16 (09 Sep 2020 02:00) (16 - 25)  SpO2: 98% (09 Sep 2020 02:00) (95% - 100%)    PHYSICAL EXAM:  GENERAL: NAD, well-groomed, well-developed, sitting upright in bed, pleasant to interview  HEAD: Atraumatic, Normocephalic  EYES: PERRLA, conjunctiva and sclera clear  ENMT: No tonsillar erythema, exudates, or enlargement; Moist mucous membranes  NECK: Supple, no cervical lymphadenopathy  NERVOUS SYSTEM: Alert & Oriented X3, Good concentration; Motor Strength 5/5 B/L upper and lower extremities  CHEST/LUNG: Clear to auscultation bilaterally; No rales, rhonchi, wheezing, or rubs  HEART: Distant heart sounds, difficult to auscultate  ABDOMEN: Soft, Nontender; Bowel sounds present. +Moderately distended however pt states is habitus  EXTREMITIES: 2+ Peripheral Pulses, RLE mild pitting edema 1+

## 2020-09-09 NOTE — CONSULT NOTE ADULT - SUBJECTIVE AND OBJECTIVE BOX
HPI:  59M PMH T2DM, HTN, CAD/MI s/p stents (most recent 2/2018), HFrEF (10-15%), ICD,  ischemic cardiomyopathy, COPD, HTN, GERD pw fever, n/v, back pain x 1 day. Pt states that he was in his baseline state of health when he began to experience back pain while grocery shopping yesterday. He describes sudden onset left lower back pain, palliated by sitting upright, of sharp quality, without radiation, of 7/10 severity, which completely resolved when presenting to ED. After returning home from shopping he experienced chills but did not measure his temperature. After drinking 1 bottle of water he felt nauseous and had an episode of NBNB emesis, with resolved nausea afterward. He reports one instance of cloudy/dark urine while admitted, but denies dysuria, hematuria, change in frequency or foul odor. In addition he reports cough without sputum production. He denies HA, weakness, confusion, sinus congestion, CP, abd pain, dysuria, hematuria, diarrhea, or myalgias.     ED Course:  VS: febrile 102.9, HR , BP 63/40 to 105/62, RR 17-25, SpO2%  on RA  Labs: significant for WBC 10.28, thrombocytopenia 146, elevated BUN/SCr 32/1.88, hyperglycemic 247, elevated proBNP 1158, lactate wnl  Micro: COVID-19 negative, RVP negative, UA not concerning for infxn  Imaging: CXR: clr lungs (prelim read)  Orders: received po tylenol 975mg, po motrin 600mg, IV azithromycin 500mg, IV ceftriaxone 1000mg. Midodrine 10mg x 1 received and ordered for 10mg q8h. Started on levophed. Received 500 cc bolus of LR and 500 cc bolus of NS. Urine and Blood Cx sent, CT A/P, chest, non contrast ordered.     Pt admitted to MICU for management of hypotension requiring pressor support. While in ICU experienced emesis and diarrhea (without melena/hematochezia) x 1 with SOB. (09 Sep 2020 02:30)    Endocrine History:  Patient known to Service from previous hospital admission, has history of diabetes, A1C pending, was on high-dose basal bolus insulin at home (Lantus 74u at bedtime, Humalog 60u before each meal), reports blood sugars in the 200s, no recent hypoglycemic episodes, no polyuria polydipsia. Patient follows up with PCP for diabetes management.  Endo was consulted for glycemic control.    PAST MEDICAL & SURGICAL HISTORY:  H/O gastroesophageal reflux (GERD)  History of COPD  History of ischemic cardiomyopathy  Heart failure with reduced ejection fraction  Hypertension  AICD (automatic cardioverter/defibrillator) present  Diabetes  Stented coronary artery  H/O vasectomy: 20 yrs ago (2000)      FAMILY HISTORY:  Family history of cardiac disorder: Paternal  Family history of COPD (chronic obstructive pulmonary disease) (Sibling)      Social History:    Outpatient Medications:    MEDICATIONS  (STANDING):  aspirin  chewable 81 milliGRAM(s) Oral daily  atorvastatin 80 milliGRAM(s) Oral at bedtime  cefTRIAXone   IVPB 1000 milliGRAM(s) IV Intermittent every 24 hours  chlorhexidine 4% Liquid 1 Application(s) Topical <User Schedule>  clopidogrel Tablet 75 milliGRAM(s) Oral daily  dextrose 5%. 1000 milliLiter(s) (50 mL/Hr) IV Continuous <Continuous>  dextrose 50% Injectable 12.5 Gram(s) IV Push once  dextrose 50% Injectable 25 Gram(s) IV Push once  dextrose 50% Injectable 25 Gram(s) IV Push once  influenza   Vaccine 0.5 milliLiter(s) IntraMuscular once  insulin glargine Injectable (LANTUS) 50 Unit(s) SubCutaneous at bedtime  insulin lispro (HumaLOG) corrective regimen sliding scale   SubCutaneous three times a day before meals  insulin lispro (HumaLOG) corrective regimen sliding scale   SubCutaneous at bedtime  insulin lispro Injectable (HumaLOG) 10 Unit(s) SubCutaneous three times a day before meals  loratadine 10 milliGRAM(s) Oral daily  metoprolol tartrate 25 milliGRAM(s) Oral two times a day  midodrine 10 milliGRAM(s) Oral every 8 hours  pantoprazole    Tablet 40 milliGRAM(s) Oral before breakfast  tamsulosin 0.4 milliGRAM(s) Oral at bedtime  vancomycin  IVPB 1500 milliGRAM(s) IV Intermittent once    MEDICATIONS  (PRN):  ALBUTerol    90 MICROgram(s) HFA Inhaler 2 Puff(s) Inhalation every 12 hours PRN Shortness of Breath and/or Wheezing  dextrose 40% Gel 15 Gram(s) Oral once PRN Blood Glucose LESS THAN 70 milliGRAM(s)/deciliter  glucagon  Injectable 1 milliGRAM(s) IntraMuscular once PRN Glucose LESS THAN 70 milligrams/deciliter      Allergies    No Known Allergies    Intolerances      Review of Systems:  Constitutional: No fever, no chills  Eyes: No blurry vision  Neuro: No tremors  HEENT: No pain, no neck swelling  Cardiovascular: No chest pain, no palpitations  Respiratory: Has SOB, no cough  GI: No nausea, vomiting, abdominal pain  : No dysuria  Skin: no rash  MSK: Has leg swelling.  Psych: no depression  Endocrine: no polyuria, polydipsia    ALL OTHER SYSTEMS REVIEWED AND NEGATIVE    UNABLE TO OBTAIN    PHYSICAL EXAM:  VITALS: T(C): 38.1 (09-09-20 @ 12:00)  T(F): 100.6 (09-09-20 @ 12:00), Max: 102.9 (09-08-20 @ 19:54)  HR: 115 (09-09-20 @ 12:00) (90 - 120)  BP: 119/63 (09-09-20 @ 12:00) (63/40 - 142/72)  RR:  (16 - 37)  SpO2:  (90% - 100%)  Wt(kg): --  GENERAL: NAD, well-groomed, well-developed  EYES: No proptosis, no lid lag  HEENT:  Atraumatic, Normocephalic  THYROID: Normal size, no palpable nodules  RESPIRATORY: Clear to auscultation bilaterally; No rales, rhonchi, wheezing  CARDIOVASCULAR: Si S2, No murmurs;  GI: Soft, non distended, normal bowel sounds  SKIN: Dry, intact, No rashes or lesions  MUSCULOSKELETAL: Has BL lower extremity edema.  NEURO:  no tremor, sensation decreased in feet BL,    POCT Blood Glucose.: 366 mg/dL (09-09-20 @ 11:20)                            12.8   11.16 )-----------( 116      ( 09 Sep 2020 04:33 )             39.4       09-09    132<L>  |  94<L>  |  38<H>  ----------------------------<  382<H>  3.5   |  20<L>  |  2.07<H>    EGFR if : 40<L>  EGFR if non : 35<L>    Ca    8.6      09-09  Mg     1.6     09-09  Phos  3.6     09-09    TPro  7.0  /  Alb  3.9  /  TBili  0.7  /  DBili  x   /  AST  41<H>  /  ALT  41  /  AlkPhos  66  09-09      Thyroid Function Tests:  09-09 @ 07:25 TSH 1.74 FreeT4 -- T3 -- Anti TPO -- Anti Thyroglobulin Ab -- TSI --              Radiology:

## 2020-09-09 NOTE — CONSULT NOTE ADULT - ASSESSMENT
59M PMH T2DM, HTN, CAD/MI s/p stents (most recent 2/2018), ICM w/ HFrEF (10-15%) s/p AICD, COPD (not on home O2), HTN, GERD, BPH, who is admitted for sepsis 2/2 gram positive bacteremia. Admitted to MICU for hypotension    RONI  Baseline scr 1.4  Scr elevated to 2 today  Roni likely  sec to hemodynamic changes/ hypotension +hyperglycemia and  nsaid use  CT A/P suggestive of atrophic right kidney and chronic hydro  PT s/p tinoco with good urine outpt  Optimize BP, Optimize Glucose  UA with trace protein no RBC, repeat UA    Hypokalemia   sec to diuretic use  Replete KCL 40MEQ one dose  MOnitor serum K    Hyponatremia  Sec to hyperglycemia  Optimize glucose control  MOnitor serum Na 59M PMH T2DM, HTN, CAD/MI s/p stents (most recent 2/2018), ICM w/ HFrEF (10-15%) s/p AICD, COPD (not on home O2), HTN, GERD, BPH, who is admitted for sepsis 2/2 gram positive bacteremia. Admitted to MICU for hypotension    RONI  Baseline scr 1.4  Scr elevated to 2 today  Roni likely  sec to hemodynamic changes/ hypotension +hyperglycemia and  nsaid use  CT A/P suggestive of atrophic right kidney and chronic hydro-- at present unlikely source of bacteremia or Roni  Follow up Urology/ IR   UA unremarkable, follow up urine culture  PT s/p tinoco with good urine outpt  Optimize BP, Optimize Glucose  UA with trace protein no RBC, repeat UA  MOnitor BMP  Avoid further nephrotoxics, NSAID RCA    Hypokalemia   sec to diuretic use  Replete KCL 40MEQ one dose  MOnitor serum K    Hyponatremia  Sec to hyperglycemia  Optimize glucose control  MOnitor serum Na

## 2020-09-09 NOTE — CONSULT NOTE ADULT - SUBJECTIVE AND OBJECTIVE BOX
59yMale PMHx of kidney stones (5 years ago on left requiring intervention) T2DM, HTN, CAD/MI s/p stents (most recent 2018), HFrEF (10-15%), ICD, ischemic cardiomyopathy, COPD, HTN, GERD admitted with fever, n/v, x 1 day. Consulted for CT finding of atrophied R kidney with chronic hydronephrosis as per prior CT chest, pt does not have any prior CT A/P to compare. Pt states that he has been having fever, nausea, vomiting, diarrhea. Denies dysuria, hematuria, change in frequency or foul odor, abd pain, flank pain, history of UTI's, kidney or bladder problems. Pt endorses history of left sided kidney stones and has required procedure in [place] 5 years ago. Pt does not currently see a urologist.     ED Course:  VS: febrile 102.9, HR , BP 63/40 to 105/62, RR 17-25, SpO2%  on RA, requiring Levo in ED   White count: 10.28, Cr 1.88, UA neg  CT scan prelim: atrophied R kidney with chronic hydronephrosis as per previous CT chest imaging     PAST MEDICAL & SURGICAL HISTORY:  H/O gastroesophageal reflux (GERD)  History of COPD  History of ischemic cardiomyopathy  Heart failure with reduced ejection fraction  Hypertension  AICD (automatic cardioverter/defibrillator) present  Diabetes  Stented coronary artery  H/O vasectomy: 20 yrs ago ()      MEDICATIONS  (STANDING):  cefTRIAXone   IVPB 1000 milliGRAM(s) IV Intermittent every 24 hours  chlorhexidine 4% Liquid 1 Application(s) Topical <User Schedule>  dextrose 5%. 1000 milliLiter(s) (50 mL/Hr) IV Continuous <Continuous>  dextrose 50% Injectable 12.5 Gram(s) IV Push once  dextrose 50% Injectable 25 Gram(s) IV Push once  dextrose 50% Injectable 25 Gram(s) IV Push once  influenza   Vaccine 0.5 milliLiter(s) IntraMuscular once  insulin lispro (HumaLOG) corrective regimen sliding scale   SubCutaneous three times a day before meals  insulin lispro (HumaLOG) corrective regimen sliding scale   SubCutaneous at bedtime  midodrine 10 milliGRAM(s) Oral every 8 hours  norepinephrine Infusion 0.05 MICROgram(s)/kG/Min (11.9 mL/Hr) IV Continuous <Continuous>    MEDICATIONS  (PRN):  dextrose 40% Gel 15 Gram(s) Oral once PRN Blood Glucose LESS THAN 70 milliGRAM(s)/deciliter  glucagon  Injectable 1 milliGRAM(s) IntraMuscular once PRN Glucose LESS THAN 70 milligrams/deciliter      FAMILY HISTORY:  Family history of cardiac disorder: Paternal  Family history of COPD (chronic obstructive pulmonary disease) (Sibling)      Allergies    No Known Allergies    Intolerances        SOCIAL HISTORY:    REVIEW OF SYSTEMS: Otherwise negative as stated in HPI    Physical Exam  Vital signs  T(C): 37.7 (20 @ 04:00), Max: 39.4 (20 @ 19:54)  HR: 115 (20 @ 04:15)  BP: 125/71 (20 @ 04:00)  SpO2: 93% (20 @ 04:15)  Wt(kg): --        Gen:  AWAKE ALERT NAD AXOX3    Pulm:  NO RESP DISTRESS  	  GI:  Obese, SOFT, NT, moderate distension    Back:  No CVAT bilaterally     :  Voiding                             	      LABS:       @ 20:42    WBC 10.28 / Hct 40.5  / SCr 1.88         136  |  97  |  32<H>  ----------------------------<  247<H>  3.5   |  22  |  1.88<H>    Ca    9.4      08 Sep 2020 20:42    TPro  7.4  /  Alb  4.1  /  TBili  0.6  /  DBili  x   /  AST  33  /  ALT  36  /  AlkPhos  70      PT/INR - ( 08 Sep 2020 20:42 )   PT: 13.7 sec;   INR: 1.16 ratio         PTT - ( 08 Sep 2020 20:42 )  PTT:31.4 sec  Urinalysis Basic - ( 08 Sep 2020 23:31 )    Color: Yellow / Appearance: Clear / S.017 / pH: x  Gluc: x / Ketone: Negative  / Bili: Negative / Urobili: Negative   Blood: x / Protein: 30 mg/dL / Nitrite: Negative   Leuk Esterase: Negative / RBC: 2 /hpf / WBC 1 /HPF   Sq Epi: x / Non Sq Epi: 1 /hpf / Bacteria: Negative        Urine Cx: pending   Blood Cx: pending     RADIOLOGY:    < from: CT Chest No Cont (20 @ 01:27) >  EXAM:  CT CHEST                            PROCEDURE DATE:  2020      ******PRELIMINARY REPORT******    ******PRELIMINARY REPORT******              INTERPRETATION:  Small right pleural collection. Hepatic steatosis. Atrophied right kidney with chronic right hydronephrosis, partially imaged on CT chest from 2019.            ******PRELIMINARY REPORT******    ******PRELIMINARY REPORT******          ALEKSANDRA HODGES M.D., RADIOLOGY RESIDENT    < end of copied text > 59yMale PMHx of kidney stones (5 years ago on left requiring intervention) T2DM, HTN, CAD/MI s/p stents (most recent 2018), HFrEF (10-15%), ICD, ischemic cardiomyopathy, COPD, HTN, GERD admitted with fever, n/v, x 1 day. Consulted for CT finding of atrophied R kidney with chronic hydronephrosis as per prior CT chest, pt does not have any prior CT A/P to compare. Pt states that he has been having fever, nausea, vomiting, diarrhea. Denies dysuria, hematuria, change in frequency or foul odor, abd pain, flank pain, history of UTI's, kidney or bladder problems. Pt endorses history of left sided kidney stones and has required procedure in [place] 5 years ago. Pt does not currently see a urologist.     ED Course:  VS: febrile 102.9, HR , BP 63/40 to 105/62, RR 17-25, SpO2%  on RA, requiring Levo in ED   White count: 10.28, Cr 1.88, UA neg  CT scan prelim: atrophied R kidney with chronic hydronephrosis as per previous CT chest imaging     PAST MEDICAL & SURGICAL HISTORY:  H/O gastroesophageal reflux (GERD)  History of COPD  History of ischemic cardiomyopathy  Heart failure with reduced ejection fraction  Hypertension  AICD (automatic cardioverter/defibrillator) present  Diabetes  Stented coronary artery  H/O vasectomy: 20 yrs ago ()      MEDICATIONS  (STANDING):  cefTRIAXone   IVPB 1000 milliGRAM(s) IV Intermittent every 24 hours  chlorhexidine 4% Liquid 1 Application(s) Topical <User Schedule>  dextrose 5%. 1000 milliLiter(s) (50 mL/Hr) IV Continuous <Continuous>  dextrose 50% Injectable 12.5 Gram(s) IV Push once  dextrose 50% Injectable 25 Gram(s) IV Push once  dextrose 50% Injectable 25 Gram(s) IV Push once  influenza   Vaccine 0.5 milliLiter(s) IntraMuscular once  insulin lispro (HumaLOG) corrective regimen sliding scale   SubCutaneous three times a day before meals  insulin lispro (HumaLOG) corrective regimen sliding scale   SubCutaneous at bedtime  midodrine 10 milliGRAM(s) Oral every 8 hours  norepinephrine Infusion 0.05 MICROgram(s)/kG/Min (11.9 mL/Hr) IV Continuous <Continuous>    MEDICATIONS  (PRN):  dextrose 40% Gel 15 Gram(s) Oral once PRN Blood Glucose LESS THAN 70 milliGRAM(s)/deciliter  glucagon  Injectable 1 milliGRAM(s) IntraMuscular once PRN Glucose LESS THAN 70 milligrams/deciliter      FAMILY HISTORY:  Family history of cardiac disorder: Paternal  Family history of COPD (chronic obstructive pulmonary disease) (Sibling)      Allergies    No Known Allergies    Intolerances        SOCIAL HISTORY:    REVIEW OF SYSTEMS: Otherwise negative as stated in HPI    Physical Exam  Vital signs  T(C): 37.7 (20 @ 04:00), Max: 39.4 (20 @ 19:54)  HR: 115 (20 @ 04:15)  BP: 125/71 (20 @ 04:00)  SpO2: 93% (20 @ 04:15)  Wt(kg): --      Gen: NAD, A&Ox3  Pulm: No respiratory distress  GI: Obese, soft, NT, moderate distension  Back: No CVAT bilaterally   : Voiding                        	      LABS:     @ 20:42  WBC 10.28 / Hct 40.5  / SCr 1.88         136  |  97  |  32<H>  ----------------------------<  247<H>  3.5   |  22  |  1.88<H>    Ca    9.4      08 Sep 2020 20:42    TPro  7.4  /  Alb  4.1  /  TBili  0.6  /  DBili  x   /  AST  33  /  ALT  36  /  AlkPhos  70      PT/INR - ( 08 Sep 2020 20:42 )   PT: 13.7 sec;   INR: 1.16 ratio         PTT - ( 08 Sep 2020 20:42 )  PTT:31.4 sec  Urinalysis Basic - ( 08 Sep 2020 23:31 )    Color: Yellow / Appearance: Clear / S.017 / pH: x  Gluc: x / Ketone: Negative  / Bili: Negative / Urobili: Negative   Blood: x / Protein: 30 mg/dL / Nitrite: Negative   Leuk Esterase: Negative / RBC: 2 /hpf / WBC 1 /HPF   Sq Epi: x / Non Sq Epi: 1 /hpf / Bacteria: Negative        Urine Cx: pending   Blood Cx: pending     RADIOLOGY:    < from: CT Chest No Cont (20 @ 01:27) >  EXAM:  CT CHEST                            PROCEDURE DATE:  2020      ******PRELIMINARY REPORT******      INTERPRETATION:  Small right pleural collection. Hepatic steatosis. Atrophied right kidney with chronic right hydronephrosis, partially imaged on CT chest from 2019.      ALEKSANDRA HODGES M.D., RADIOLOGY RESIDENT

## 2020-09-09 NOTE — PROVIDER CONTACT NOTE (CHANGE IN STATUS NOTIFICATION) - ASSESSMENT
A&Ox4. Afebrile with IV tylenol given. Patient dyspneic with coarse lung sounds. O2 saturation of 90-92% on room air. Patient tachycardic to the 110's. Patient Off levophed for 1 hour received 10mg of midodrine. Vomiting and Diarrhea. Bladder scan showed 400ml of urine, however belly extremely large and firm appears to be more. difficulty voiding with increased urgency. Denies any hx of difficulty voiding.

## 2020-09-09 NOTE — H&P ADULT - NSHPSOCIALHISTORY_GEN_ALL_CORE
Reports 40 pack year tobacco history, quit 1 year ago. Denies EtOH or illicit drug use. Denies current sexual activity or hx of STI. Lives in house w/ wife. Performs ADLs independently, does not use cane/walker or other assistance to ambulate.

## 2020-09-09 NOTE — H&P ADULT - ATTENDING COMMENTS
59M PMH T2DM, HTN, CAD/MI s/p stents (most recent 2/2018), HFrEF (10-15%), ICD,  ischemic cardiomyopathy, COPD, HTN, GERD, BPH pw fever, n/v, diarrhea, back pain x 1 day, septic upon admission, admitted to MICU for management of hypotension requiring pressor support, found to have R hydronephrosis on CT, and small pleff not seen on US.   Hypotension will likely resolve as antihypertnesives taken at home fall off.  However he does seem infected and with no coral source.   Follow up blood cultures, and continue with ampiric ceftriaxone and azithroe will cover both possible CAP and urinary source.   GI pcr for possible gastroentoritis.   Critical care time 30 min

## 2020-09-09 NOTE — PROGRESS NOTE ADULT - ASSESSMENT
Assessment: 59M PMH T2DM, HTN, CAD/MI s/p stents (most recent 2/2018), HFrEF (10-15%), ICD,  ischemic cardiomyopathy, COPD, HTN, GERD, BPH pw fever, n/v, diarrhea, back pain x 1 day, septic upon admission, admitted to MICU for management of hypotension requiring pressor support, found to have R hydronephrosis on CT likely source of infxn.    Neuro  - AOx3, no active issues    Respiratory  - Small R pleural collection on CT, CXR with clr lungs (preliminary reads)  - Pt w/ SOB after episode of emesis in addition to hx of dry cough  - Will continue home albuterol prn  - Pt satting well on NC     Cardiovascular  #Hypotension secondary to Septic Shock  - POCUS positive for B-lines, however no overt signs of pulmonary edema on CT Scan  - Cautious use IVFs  - Levophed, titrate to MAP of 65.  - Midodrine 10 q 8 to ween pressor support  - Gave lasix 80mg IV x 1 as pt SOB  - Holding home metoprolol, entresto, furosemide, indapamide  - Diuresing as needed    GI/NUTRITION  - Pt w/ episode of diarrhea without abd pain, may be indicative of gastroenteritis as source of infxn  - Stool Cx, GI PCR sent  - Continuing home pantoprazole po 40mg  - NPO pending potential urologic intervention, will f/u in am    Renal  #Hydonephrosis  - Atrophied R kidney w/ chronic R hydronephrosis which was also partially imaged on CT Chest from 11/11/2019  - Urology consulted w/ concern for possible infxn source. Assessed pt, will give recs in am  - UA without signs of infxn  - BUN/SCr 32/1.88 (1.4-1.8 during 12/2019 admission)  - Diuresed w/ IV lasix 80mg as above as pt SOB  - Pt w/ poor UOP, bladder scan revealing > 300cc, will place Peguero catheter  - Holding home diuretics and will diurese as needed as above    #BPH  - continuing home tamsulosin 0.4mg  - Peguero as above    Hematologic  #Thrombocytopenia  - Pt w/ thrombocytopenia to 146 (166-200 during 12/2019 admission)  - Will continue to monitor CBC    #DVT ppx  - Continue home clopidogrel 75mg and ASA 81mg  - Holding chemical DVT ppx pending urologic evaluation, will place on SCDs for DVT ppx    ID  #Septic Shock from suspected  source vs gastroenteritis  - Empiric tx for  source w/ Ceftriaxone. Given hydronephrosis, suspect pt may have had renal stone.   - Will check Urine Legionella to r/o atypical PNA given hypoxia  - Stool Cx/GI PCR sent as above  - Urology consulted for R hydronephrosis as above, will appreciate recs    ENDOCRINE:   - Pt w/ hx of IDDM on home Lantus 74u qhs and Humalog 50u pre-meal per pt  - Gave pt 30u Lantus stat in am after missing his nighttime dose, holding premeals as pt NPO.   - Dosed for 50u Lantus qhs 9/9  - Will monitor FSS with moderate ISS    F/E/N:  - Judicious use of fluids due to HF as above  - No active issues otherwise    Ethics  - C discussed Patient wants full code.    Kirsten Ferguson  PGY-3 Internal Medicine  Pager: [phone] (NS)/[phone] ([place]) Assessment: 59M PMH T2DM, HTN, CAD/MI s/p stents (most recent 2/2018), ICM w/ HFrEF (10-15%) s/p AICD, COPD (not on home O2), HTN, GERD, BPH, who is admitted   pw fever, n/v, diarrhea, back pain x 1 day, septic upon admission, admitted to MICU for management of hypotension requiring pressor support, found to have R hydronephrosis on CT likely source of infxn.    Neuro  - AOx3, no active issues    Respiratory  - Small R pleural collection on CT, CXR with clr lungs (preliminary reads)  - Pt w/ SOB after episode of emesis in addition to hx of dry cough  - Will continue home albuterol prn  - Pt satting well on NC     Cardiovascular  #Hypotension secondary to Septic Shock  - POCUS positive for B-lines, however no overt signs of pulmonary edema on CT Scan  - Cautious use IVFs  - Levophed, titrate to MAP of 65.  - Midodrine 10 q 8 to ween pressor support  - Gave lasix 80mg IV x 1 as pt SOB  - Holding home metoprolol, entresto, furosemide, indapamide  - Diuresing as needed    GI/NUTRITION  - Pt w/ episode of diarrhea without abd pain, may be indicative of gastroenteritis as source of infxn  - Stool Cx, GI PCR sent  - Continuing home pantoprazole po 40mg  - NPO pending potential urologic intervention, will f/u in am    Renal  #Hydonephrosis  - Atrophied R kidney w/ chronic R hydronephrosis which was also partially imaged on CT Chest from 11/11/2019  - Urology consulted w/ concern for possible infxn source. Assessed pt, will give recs in am  - UA without signs of infxn  - BUN/SCr 32/1.88 (1.4-1.8 during 12/2019 admission)  - Diuresed w/ IV lasix 80mg as above as pt SOB  - Pt w/ poor UOP, bladder scan revealing > 300cc, will place Peguero catheter  - Holding home diuretics and will diurese as needed as above    #BPH  - continuing home tamsulosin 0.4mg  - Peguero as above    Hematologic  #Thrombocytopenia  - Pt w/ thrombocytopenia to 146 (166-200 during 12/2019 admission)  - Will continue to monitor CBC    #DVT ppx  - Continue home clopidogrel 75mg and ASA 81mg  - Holding chemical DVT ppx pending urologic evaluation, will place on SCDs for DVT ppx    ID  #Septic Shock from suspected  source vs gastroenteritis  - Empiric tx for  source w/ Ceftriaxone. Given hydronephrosis, suspect pt may have had renal stone.   - Will check Urine Legionella to r/o atypical PNA given hypoxia  - Stool Cx/GI PCR sent as above  - Urology consulted for R hydronephrosis as above, will appreciate recs    ENDOCRINE:   - Pt w/ hx of IDDM on home Lantus 74u qhs and Humalog 50u pre-meal per pt  - Gave pt 30u Lantus stat in am after missing his nighttime dose, holding premeals as pt NPO.   - Dosed for 50u Lantus qhs 9/9  - Will monitor FSS with moderate ISS    F/E/N:  - Judicious use of fluids due to HF as above  - No active issues otherwise    Ethics  - GOC discussed Patient wants full code. Assessment: 59M PMH T2DM, HTN, CAD/MI s/p stents (most recent 2/2018), ICM w/ HFrEF (10-15%) s/p AICD, COPD (not on home O2), HTN, GERD, BPH, who is admitted for sepsis 2/2 gram positive bacteremia. Admitted to MICU for hypotension yet did not require pressor.     Neuro  - AOx3, no active issues    Respiratory  - Small R pleural collection on CT, CXR with clr lungs (preliminary reads)  - c/w home albuterol prn  - Pt satting well on NC    Cardiovascular  # Hypotension secondary to Septic Shock  - currently not requiring IV pressor support.   - POCUS positive for B-lines, however no overt signs of pulmonary edema on CT Scan  - Judicious use of IVF.   - c/w Midodrine 10 q 8 to ween pressor support  - resuming home metoprolol but holding entresto, furosemide, indapamide.   - Diuresing as needed    # HFrEF s/p AICD  - most recent echo from 12/2019: EF 10-15% sev global LV systolic dysfunction. eccentric LVH  - cardiology consulted. appreciate recs.  - resumed b-blocker but holding onto rest of HF meds.  - EP consulted for AICD extraction in setting of gram positive bacteremia.    # CAD s/p stent  - c/w home DAPT (ASA/PLV) and high dose statin.      GI/NUTRITION  - Pt w/ episode of diarrhea without abd pain, may be indicative of gastroenteritis as source of infxn  - GI PCR negative. Stool culture pending  - Continuing home pantoprazole po 40mg  - diabetic diet.    Renal  # Hydonephrosis  - Atrophied R kidney w/ chronic R hydronephrosis which was also partially imaged on CT Chest from 11/11/2019  - Urology consulted w/ concern for possible infxn source. Assessed pt, no urgent need for procedure for chronic hydro.  - UA without signs of infxn  - BUN/SCr 32/1.88 (1.4-1.8 during 12/2019 admission)  - s/p diuresis x1 for SOB upon admission to MICU (9/9)  - Pt w/ poor UOP, bladder scan revealing > 300cc, will place Peguero catheter  - Holding home diuretics and will diurese as needed as above  - nephrology consult for atropic kidney and MANJIT, likely in setting of hypoperfusion and sepsis. possible obstructive nephropathy given patient had poor UOP and was retaining for two days prior to admission.     #BPH  - continuing home tamsulosin 0.4mg  - Peguero as above    Hematologic  #Thrombocytopenia  - Pt w/ thrombocytopenia to 146 (166-200 during 12/2019 admission)  - Will continue to monitor CBC    #DVT ppx  - Holding chemical DVT ppx pending urologic evaluation, will place on SCDs for DVT ppx    ID  # gram positive bacteremia.  - will dose vancomycin 1.5g. (15mg/kg gives 2g, yet given MANJIT, will give reduced dose).   - random vanc level with AM labs to guide dosing.  - repeat BCx daily.  - Stool Cx/GI PCR sent as above  - Id consulted  - EP consulted for possible cardiac device explant.    ENDOCRINE:   - Pt w/ hx of IDDM on home Lantus 74u qhs and Humalog 50u pre-meal per pt  - Gave pt 30u Lantus stat in am after missing his nighttime dose, holding premeals as pt NPO.   - Dosed for 50u Lantus qhs 9/9  - Endo consulted: c/w Lantus 50u qHS and 10u qAC.   - Will monitor FSS with moderate ISS    F/E/N:  - Judicious use of fluids due to HF as above  - No active issues otherwise    Ethics  - GOC discussed Patient wants full code.

## 2020-09-09 NOTE — CONSULT NOTE ADULT - SUBJECTIVE AND OBJECTIVE BOX
CHIEF COMPLAINT:  Fever, vomiting    HISTORY OF PRESENT ILLNESS:  59M PMH T2DM, HTN, CAD/MI s/p stents (most recent 2/2018), ICM w/ HFrEF (10-15%) s/p primary prevention STJ VVI ICD, COPD (not on home O2), HTN, GERD, BPH, who was admitted for sepsis 2/2 gram positive bacteremia.  Admitted to MICU for hypotension, requiring pressors. Denies chest pain, palpitations, dizziness, lightheadedness, and shortness of breath.     STJ ICD Fortify VR 1231-40Q ICD: implanted 1/282012   Durata 7121Q/65cm: Implanted 1/28/2012     Allergies  No Known Allergies    	    MEDICATIONS:  aspirin  chewable 81 milliGRAM(s) Oral daily  clopidogrel Tablet 75 milliGRAM(s) Oral daily  metoprolol tartrate 25 milliGRAM(s) Oral two times a day  midodrine 10 milliGRAM(s) Oral every 8 hours  tamsulosin 0.4 milliGRAM(s) Oral at bedtime  cefTRIAXone   IVPB 2 milliGRAM(s) IV Intermittent every 24 hours  ALBUTerol    90 MICROgram(s) HFA Inhaler 2 Puff(s) Inhalation every 12 hours PRN  loratadine 10 milliGRAM(s) Oral daily  acetaminophen   Tablet .. 650 milliGRAM(s) Oral every 6 hours PRN  ondansetron Injectable 4 milliGRAM(s) IV Push once  pantoprazole    Tablet 40 milliGRAM(s) Oral before breakfast  atorvastatin 80 milliGRAM(s) Oral at bedtime  dextrose 40% Gel 15 Gram(s) Oral once PRN  dextrose 50% Injectable 12.5 Gram(s) IV Push once  dextrose 50% Injectable 25 Gram(s) IV Push once  dextrose 50% Injectable 25 Gram(s) IV Push once  glucagon  Injectable 1 milliGRAM(s) IntraMuscular once PRN  insulin glargine Injectable (LANTUS) 50 Unit(s) SubCutaneous at bedtime  insulin lispro (HumaLOG) corrective regimen sliding scale   SubCutaneous three times a day before meals  insulin lispro (HumaLOG) corrective regimen sliding scale   SubCutaneous at bedtime  insulin lispro Injectable (HumaLOG) 10 Unit(s) SubCutaneous three times a day before meals  chlorhexidine 4% Liquid 1 Application(s) Topical <User Schedule>  dextrose 5%. 1000 milliLiter(s) IV Continuous <Continuous>  influenza   Vaccine 0.5 milliLiter(s) IntraMuscular once      PAST MEDICAL & SURGICAL HISTORY:  H/O gastroesophageal reflux (GERD)  History of COPD  History of ischemic cardiomyopathy  Heart failure with reduced ejection fraction  Hypertension  AICD (automatic cardioverter/defibrillator) present  Diabetes  Stented coronary artery  H/O vasectomy: 20 yrs ago (2000)      FAMILY HISTORY:  Family history of cardiac disorder: Paternal  Family history of COPD (chronic obstructive pulmonary disease) (Sibling)      SOCIAL HISTORY:    Reports 40 pack year tobacco history, quit 1 year ago. Denies EtOH or illicit drug use. Lives in house w/ wife.       REVIEW OF SYSTEMS:  See HPI. Otherwise, 10 point ROS done and otherwise negative.    PHYSICAL EXAM:  T(C): 38.2 (09-09-20 @ 16:00), Max: 39.4 (09-08-20 @ 19:54)  HR: 110 (09-09-20 @ 19:00) (90 - 120)  BP: 119/75 (09-09-20 @ 19:00) (63/40 - 142/72)  RR: 21 (09-09-20 @ 19:00) (16 - 37)  SpO2: 95% (09-09-20 @ 19:00) (90% - 100%)  Wt(kg): --  I&O's Summary    08 Sep 2020 07:01  -  09 Sep 2020 07:00  --------------------------------------------------------  IN: 107.8 mL / OUT: 1125 mL / NET: -1017.2 mL    09 Sep 2020 07:01  -  09 Sep 2020 19:17  --------------------------------------------------------  IN: 1000 mL / OUT: 1470 mL / NET: -470 mL        Appearance: Normal	  Cardiovascular: Normal S1 S2, No JVD, No murmurs, No edema  Respiratory: Lungs clear to auscultation	  Psychiatry: A & O x 3, Mood & affect appropriate  Skin: No rashes, No ecchymoses, No cyanosis	  Extremities: Normal range of motion, No clubbing, cyanosis or edema  Vascular: Peripheral pulses palpable 2+ bilaterally        LABS:	 	    CBC Full  -  ( 09 Sep 2020 04:33 )  WBC Count : 11.16 K/uL  Hemoglobin : 12.8 g/dL  Hematocrit : 39.4 %  Platelet Count - Automated : 116 K/uL  Mean Cell Volume : 79.8 fl  Mean Cell Hemoglobin : 25.9 pg  Mean Cell Hemoglobin Concentration : 32.5 gm/dL  Auto Neutrophil # : 9.25 K/uL  Auto Lymphocyte # : 0.75 K/uL  Auto Monocyte # : 1.02 K/uL  Auto Eosinophil # : 0.00 K/uL  Auto Basophil # : 0.03 K/uL  Auto Neutrophil % : 82.9 %  Auto Lymphocyte % : 6.7 %  Auto Monocyte % : 9.1 %  Auto Eosinophil % : 0.0 %  Auto Basophil % : 0.3 %    09-09    132<L>  |  94<L>  |  40<H>  ----------------------------<  416<H>  3.3<L>   |  22  |  2.02<H>  09-09    132<L>  |  94<L>  |  38<H>  ----------------------------<  382<H>  3.5   |  20<L>  |  2.07<H>    Ca    8.9      09 Sep 2020 14:20  Ca    8.6      09 Sep 2020 04:36  Phos  2.9     09-09  Phos  3.6     09-09  Mg     1.8     09-09  Mg     1.6     09-09    TPro  7.0  /  Alb  3.9  /  TBili  0.7  /  DBili  x   /  AST  41<H>  /  ALT  41  /  AlkPhos  66  09-09  TPro  7.4  /  Alb  4.1  /  TBili  0.6  /  DBili  x   /  AST  33  /  ALT  36  /  AlkPhos  70  09-08  proBNP: Serum Pro-Brain Natriuretic Peptide: 1158 pg/mL (09-08 @ 20:42)    TSH: Thyroid Stimulating Hormone, Serum: 1.74 uIU/mL (09-09 @ 07:25)    Culture - Blood (09.09.20 @ 00:57)  Gram Stain: Growth in aerobic and anaerobic bottles: Gram Positive Cocci in Pairs and  Chains  Specimen Source: .Blood Blood-Peripheral  Culture Results:   Growth in aerobic and anaerobic bottles: Gram Positive Cocci in Pairs and  Chains    Culture - Blood (09.09.20 @ 00:57)  Gram Stain:   Growth in anaerobic bottle: Gram Positive Cocci in Pairs and Chains  Growth in aerobic bottle: Gram Positive Cocci in Pairs and Chains    -  Streptococcus sp. (Not Grp A, B or S pneumoniae): Detec    Specimen Source: .Blood Blood-Peripheral    Organism: Blood Culture PCR    Culture Results:   Growth in anaerobic bottle: Gram Positive Cocci in Pairs and Chains  Growth in aerobic bottle: Gram Positive Cocci in Pairs and Chains  "Due to technical problems, Proteus sp. will Not be reported as part of  the BCID panel until further notice"  ***Blood Panel PCR results on this specimen are available  approximately 3 hours after the Gram stain result.***  Gram stain, PCR, and/or culture results may not always  correspond due to difference in methodologies.  ************************************************************  This PCR assay was performed using [employer].  The following targets are tested for: Enterococcus,  vancomycin resistant enterococci, Listeria monocytogenes,  coagulase negative staphylococci, S. aureus,  methicillin resistant S. aureus, Streptococcus agalactiae  (Group B), S. pneumoniae, S. pyogenes (Group A),  Acinetobacter baumannii, Enterobacter cloacae, E. coli,  Klebsiella oxytoca, K. pneumoniae, Proteus sp.,  Serratia marcescens, Haemophilus influenzae,  Neisseria meningitidis, Pseudomonas aeruginosa, Candida  albicans, C. glabrata, C krusei, C parapsilosis,  C. tropicalis and the KPC resistance gene.    Organism Identification: Blood Culture PCR    Method Type: PCR                TELEMETRY: 	      PREVIOUS DIAGNOSTIC TESTING:    [X] Echocardiogram:  < from: TTE with Doppler (w/Cont) (12.17.19 @ 06:40) >  Patient name: SEVERIANO MARTINEZ  YOB: 1960   Age: 59 (M)   MR#: [id number]  Study Date: 12/17/2019  [facility]  Study quality: Technically fair  Referring Physician: Maurice Mcgovern MD  Blood Pressure: 117/69 mmHg  Height: 196 cm  Weight: 121 kg  BSA: 2.5 m2  ------------------------------------------------------------------------  PROCEDURE: Transthoracic echocardiogram with 2-D, M-Mode  and complete spectral and color flow Doppler. Verbal  consent was obtained for injection of  Ultrasonic Enhancing  Agent following a discussion of risks and benefits.  Following intravenous injection of Ultrasonic Enhancing  Agent , harmonic imaging was performed.  INDICATION: Unspecified combined systolic (congestive) and  diastolic (congestive) heart failure (I50.40)  ------------------------------------------------------------------------  Dimensions:    Normal Values:  LA:     4.1    2.0 - 4.0 cm  Ao:     3.1    2.0 - 3.8 cm  SEPTUM:0.7    0.6 - 1.2 cm  PWT:    0.8    0.6 - 1.1 cm  LVIDd:  7.0    3.0 - 5.6 cm  LVIDs:  6.1    1.8 - 4.0 cm  Derived variables:  LVMI: 90 g/m2  RWT: 0.22  EF (Visual Estimate): 10-15 %  ------------------------------------------------------------------------  Observations:  Mitral Valve: Tethered mitral valve leaflets with normal  opening. Mild-moderate mitral regurgitation.  Aortic Valve/Aorta: Calcified trileaflet aortic valve with  normal opening. No aortic valve regurgitation seen.  Aortic Root: 3.1 cm.  Left Atrium: Normal left atrium.  LA volume index = 25  cc/m2.  Left Ventricle: Endocardial visualization enhanced with  intravenous injection of Ultrasonic Enhancing Agent  (Definity). Severe global left ventricular systolic  dysfunction.No left ventricular thrombus. Eccentric left  ventricular hypertrophy (dilated left ventricle with normal  relative wall thickness).  Right Heart: Right atrium not well visualized, probably  normal. The right ventricle is not well visualized; grossly  normal right ventricular systolic function. A device wire  is noted in the right heart. Normal tricuspid valve.  Minimal tricuspid regurgitation. Pulmonic valve not well  visualized, probably normal. No pulmonic regurgitation.  Pericardium/Pleura: Trace pericardial effusion.  Hemodynamic: Estimated right atrial pressure is 8 mm Hg.  Estimated right ventricular systolic pressure equals 26 mm  Hg, assuming right atrial pressure equals 8 mm Hg,  consistent with normal pulmonary pressures. Incomplete  tricuspid regurgitation Doppler envelopes in this study may  underestimate RVSP.  ------------------------------------------------------------------------  Conclusions:  1. Tethered mitral valve leaflets with normal opening.  Mild-moderate mitral regurgitation.  2. Calcified trileaflet aortic valve with normal opening.  No aortic valve regurgitation seen.  3. Eccentric left ventricular hypertrophy (dilated left  ventricle with normal relative wall thickness).  4. Endocardial visualization enhanced with intravenous  injection of Ultrasonic Enhancing Agent (Definity). Severe  global left ventricular systolic dysfunction. No left  ventricular thrombus.  5. The right ventricle is not well visualized; grossly  normal right ventricular systolic function. A device wire  is noted in the right heart.  6. Trace pericardial effusion.  *** Compared with echocardiogram of 2/8/2018, results are  similar on today's study.  ------------------------------------------------------------------------  Confirmed on  12/17/2019 - 12:07:02 by Candelaria Gill M.D.  ------------------------------------------------------------------------    < end of copied text >    [X]  Catheterization:  < from: Cardiac Cath Lab - Adult (12.18.19 @ 14:38) >  CORONARY VESSELS: The coronary circulation is right dominant.  LM:  --  LM: Normal.  LAD:   --  Ostial LAD: There was a 100 % stenosis.  CX:   --  Distal circumflex: There was a 80 % stenosis.  RI:   --  Ramus intermedius: Normal. There was no significant restenosis.  RCA:   --  Mid RCA: There was a 50 % stenosis.    < end of copied text >

## 2020-09-09 NOTE — CONSULT NOTE ADULT - SUBJECTIVE AND OBJECTIVE BOX
[facility] [place] [facility]   MD JESENIA THOMPSON MD RUORU WONG, PA    TEL:  OFFICE: [phone]  DR FORTUNE CELL: [phone]  FABIOLA HARRINGTON CELL: [phone]  DR. CANTRELL CELL: [phone]  DR. DAVIDSON CELl: [phone]    FROM 5 PM- 7 AM PLEASE CALL ANSWERING SERVICE AT [phone]    -- INITIAL RENAL CONSULT NOTE  --------------------------------------------------------------------------------  HPI:  59M PMH T2DM, HTN, CAD/MI s/p stents (most recent 2/2018), ICM w/ HFrEF (10-15%) s/p AICD, COPD (not on home O2), HTN, GERD, BPH, who is admitted for sepsis 2/2 gram positive bacteremia. Admitted to MICU for hypotension.  Nephrology consulted for MANJIT      PAST HISTORY  --------------------------------------------------------------------------------  PAST MEDICAL & SURGICAL HISTORY:  H/O gastroesophageal reflux (GERD)  History of COPD  History of ischemic cardiomyopathy  Heart failure with reduced ejection fraction  Hypertension  AICD (automatic cardioverter/defibrillator) present  Diabetes  Stented coronary artery  H/O vasectomy: 20 yrs ago (2000)    FAMILY HISTORY:  Family history of cardiac disorder: Paternal  Family history of COPD (chronic obstructive pulmonary disease) (Sibling)    PAST SOCIAL HISTORY:    ALLERGIES & MEDICATIONS  --------------------------------------------------------------------------------  Allergies    No Known Allergies    Intolerances      Standing Inpatient Medications  aspirin  chewable 81 milliGRAM(s) Oral daily  atorvastatin 80 milliGRAM(s) Oral at bedtime  chlorhexidine 4% Liquid 1 Application(s) Topical <User Schedule>  clopidogrel Tablet 75 milliGRAM(s) Oral daily  dextrose 5%. 1000 milliLiter(s) IV Continuous <Continuous>  dextrose 50% Injectable 12.5 Gram(s) IV Push once  dextrose 50% Injectable 25 Gram(s) IV Push once  dextrose 50% Injectable 25 Gram(s) IV Push once  influenza   Vaccine 0.5 milliLiter(s) IntraMuscular once  insulin glargine Injectable (LANTUS) 50 Unit(s) SubCutaneous at bedtime  insulin lispro (HumaLOG) corrective regimen sliding scale   SubCutaneous three times a day before meals  insulin lispro (HumaLOG) corrective regimen sliding scale   SubCutaneous at bedtime  insulin lispro Injectable (HumaLOG) 10 Unit(s) SubCutaneous three times a day before meals  loratadine 10 milliGRAM(s) Oral daily  metoprolol tartrate 25 milliGRAM(s) Oral two times a day  midodrine 10 milliGRAM(s) Oral every 8 hours  pantoprazole    Tablet 40 milliGRAM(s) Oral before breakfast  tamsulosin 0.4 milliGRAM(s) Oral at bedtime    PRN Inpatient Medications  ALBUTerol    90 MICROgram(s) HFA Inhaler 2 Puff(s) Inhalation every 12 hours PRN  dextrose 40% Gel 15 Gram(s) Oral once PRN  glucagon  Injectable 1 milliGRAM(s) IntraMuscular once PRN      REVIEW OF SYSTEMS  --------------------------------------------------------------------------------  Gen: No fevers/chills  Skin: No rashes  Head/Eyes/Ears: Normal hearing,  Normal vision   Respiratory: No dyspnea, cough  CV: No chest pain  GI: No abdominal pain, diarrhea, constipation, nausea, vomiting  : No dysuria, hematuria  MSK: No  edema  Heme: No easy bruising or bleeding  Psych: No significant depression    All other systems were reviewed and are negative, except as noted.    VITALS/PHYSICAL EXAM  --------------------------------------------------------------------------------  T(C): 37.7 (09-09-20 @ 14:00), Max: 39.4 (09-08-20 @ 19:54)  HR: 114 (09-09-20 @ 15:00) (90 - 120)  BP: 106/69 (09-09-20 @ 15:00) (63/40 - 142/72)  RR: 28 (09-09-20 @ 15:00) (16 - 37)  SpO2: 94% (09-09-20 @ 15:00) (90% - 100%)  Wt(kg): --  Height (cm): 195.6 (09-09-20 @ 01:40)  Weight (kg): 129.5 (09-09-20 @ 01:40)  BMI (kg/m2): 33.8 (09-09-20 @ 01:40)  BSA (m2): 2.6 (09-09-20 @ 01:40)      09-08-20 @ 07:01  -  09-09-20 @ 07:00  --------------------------------------------------------  IN: 107.8 mL / OUT: 1125 mL / NET: -1017.2 mL    09-09-20 @ 07:01  -  09-09-20 @ 16:03  --------------------------------------------------------  IN: 1000 mL / OUT: 1175 mL / NET: -175 mL      Physical Exam:  	Gen: NAD  	HEENT: MMM  	Pulm: CTA B/L  	CV: S1S2  	Abd: Soft, +BS   	Ext: No LE edema B/L  	Neuro: Awake, alert  	Skin: Warm and dry  	Vascular access:          : alejandrina  LABS/STUDIES  --------------------------------------------------------------------------------              12.8   11.16 >-----------<  116      [09-09-20 @ 04:33]              39.4     132  |  94  |  40  ----------------------------<  416      [09-09-20 @ 14:20]  3.3   |  22  |  2.02        Ca     8.9     [09-09-20 @ 14:20]      Mg     1.8     [09-09-20 @ 14:20]      Phos  2.9     [09-09-20 @ 14:20]    TPro  7.0  /  Alb  3.9  /  TBili  0.7  /  DBili  x   /  AST  41  /  ALT  41  /  AlkPhos  66  [09-09-20 @ 04:36]    PT/INR: PT 13.8 , INR 1.17       [09-09-20 @ 04:33]  PTT: 30.0       [09-09-20 @ 04:33]      Creatinine Trend:  SCr 2.02 [09-09 @ 14:20]  SCr 2.07 [09-09 @ 04:36]  SCr 1.88 [09-08 @ 20:42]    Urinalysis - [09-09-20 @ 07:23]      Color Light Yellow / Appearance Clear / SG 1.014 / pH 6.0      Gluc Trace / Ketone Negative  / Bili Negative / Urobili Negative       Blood Trace / Protein Trace / Leuk Est Negative / Nitrite Negative      RBC 2 / WBC 0 / Hyaline 4 / Gran  / Sq Epi  / Non Sq Epi 0 / Bacteria Negative      HbA1c 8.9      [12-16-19 @ 08:15]  TSH 1.74      [09-09-20 @ 07:25]    HCV 0.09, Nonreact      [12-16-19 @ 08:36] [facility] [place] [facility]   MD JESENIA THOMPSON MD RUORU WONG, PA    TEL:  OFFICE: [phone]  DR FORTUNE CELL: [phone]  FABIOLA HARRINGTON CELL: [phone]  DR. CANTRELL CELL: [phone]  DR. DAVIDSON CELl: [phone]    FROM 5 PM- 7 AM PLEASE CALL ANSWERING SERVICE AT [phone]    -- INITIAL RENAL CONSULT NOTE  --------------------------------------------------------------------------------  HPI:  59M PMH T2DM, HTN, CAD/MI s/p stents (most recent 2/2018), ICM w/ HFrEF (10-15%) s/p AICD, COPD (not on home O2), HTN, GERD, BPH, who is admitted for sepsis 2/2 gram positive bacteremia. Admitted to MICU for hypotension.  PT seen and examined in MICU  Nephrology consulted for MANJIT.   Pt denies any known hx of kidney disease. baseline appears to 1.4     PAST HISTORY  --------------------------------------------------------------------------------  PAST MEDICAL & SURGICAL HISTORY:  H/O gastroesophageal reflux (GERD)  History of COPD  History of ischemic cardiomyopathy  Heart failure with reduced ejection fraction  Hypertension  AICD (automatic cardioverter/defibrillator) present  Diabetes  Stented coronary artery  H/O vasectomy: 20 yrs ago (2000)    FAMILY HISTORY:  Family history of cardiac disorder: Paternal  Family history of COPD (chronic obstructive pulmonary disease) (Sibling)    PAST SOCIAL HISTORY:    ALLERGIES & MEDICATIONS  --------------------------------------------------------------------------------  Allergies    No Known Allergies    Intolerances      Standing Inpatient Medications  aspirin  chewable 81 milliGRAM(s) Oral daily  atorvastatin 80 milliGRAM(s) Oral at bedtime  chlorhexidine 4% Liquid 1 Application(s) Topical <User Schedule>  clopidogrel Tablet 75 milliGRAM(s) Oral daily  dextrose 5%. 1000 milliLiter(s) IV Continuous <Continuous>  dextrose 50% Injectable 12.5 Gram(s) IV Push once  dextrose 50% Injectable 25 Gram(s) IV Push once  dextrose 50% Injectable 25 Gram(s) IV Push once  influenza   Vaccine 0.5 milliLiter(s) IntraMuscular once  insulin glargine Injectable (LANTUS) 50 Unit(s) SubCutaneous at bedtime  insulin lispro (HumaLOG) corrective regimen sliding scale   SubCutaneous three times a day before meals  insulin lispro (HumaLOG) corrective regimen sliding scale   SubCutaneous at bedtime  insulin lispro Injectable (HumaLOG) 10 Unit(s) SubCutaneous three times a day before meals  loratadine 10 milliGRAM(s) Oral daily  metoprolol tartrate 25 milliGRAM(s) Oral two times a day  midodrine 10 milliGRAM(s) Oral every 8 hours  pantoprazole    Tablet 40 milliGRAM(s) Oral before breakfast  tamsulosin 0.4 milliGRAM(s) Oral at bedtime    PRN Inpatient Medications  ALBUTerol    90 MICROgram(s) HFA Inhaler 2 Puff(s) Inhalation every 12 hours PRN  dextrose 40% Gel 15 Gram(s) Oral once PRN  glucagon  Injectable 1 milliGRAM(s) IntraMuscular once PRN      REVIEW OF SYSTEMS  --------------------------------------------------------------------------------  Gen: No fevers/chills  Skin: No rashes  Head/Eyes/Ears: Normal hearing,  Normal vision   Respiratory: No dyspnea, cough  CV: No chest pain  GI: No abdominal pain, diarrhea, constipation, nausea, vomiting  : + hematuria  MSK: +  edema  Heme: No easy bruising or bleeding  Psych: No significant depression    All other systems were reviewed and are negative, except as noted.    VITALS/PHYSICAL EXAM  --------------------------------------------------------------------------------  T(C): 37.7 (09-09-20 @ 14:00), Max: 39.4 (09-08-20 @ 19:54)  HR: 114 (09-09-20 @ 15:00) (90 - 120)  BP: 106/69 (09-09-20 @ 15:00) (63/40 - 142/72)  RR: 28 (09-09-20 @ 15:00) (16 - 37)  SpO2: 94% (09-09-20 @ 15:00) (90% - 100%)  Wt(kg): --  Height (cm): 195.6 (09-09-20 @ 01:40)  Weight (kg): 129.5 (09-09-20 @ 01:40)  BMI (kg/m2): 33.8 (09-09-20 @ 01:40)  BSA (m2): 2.6 (09-09-20 @ 01:40)      09-08-20 @ 07:01  -  09-09-20 @ 07:00  --------------------------------------------------------  IN: 107.8 mL / OUT: 1125 mL / NET: -1017.2 mL    09-09-20 @ 07:01  -  09-09-20 @ 16:03  --------------------------------------------------------  IN: 1000 mL / OUT: 1175 mL / NET: -175 mL      Physical Exam:  	Gen: NAD  	HEENT: MMM  	Pulm: CTA B/L  	CV: S1S2  	Abd: Soft, +BS   	Ext: + LE edema B/L  	Neuro: Awake, alert  	Skin: Warm and dry  	Vascular access: nohd catheter          : alejandrina present  LABS/STUDIES  --------------------------------------------------------------------------------              12.8   11.16 >-----------<  116      [09-09-20 @ 04:33]              39.4     132  |  94  |  40  ----------------------------<  416      [09-09-20 @ 14:20]  3.3   |  22  |  2.02        Ca     8.9     [09-09-20 @ 14:20]      Mg     1.8     [09-09-20 @ 14:20]      Phos  2.9     [09-09-20 @ 14:20]    TPro  7.0  /  Alb  3.9  /  TBili  0.7  /  DBili  x   /  AST  41  /  ALT  41  /  AlkPhos  66  [09-09-20 @ 04:36]    PT/INR: PT 13.8 , INR 1.17       [09-09-20 @ 04:33]  PTT: 30.0       [09-09-20 @ 04:33]      Creatinine Trend:  SCr 2.02 [09-09 @ 14:20]  SCr 2.07 [09-09 @ 04:36]  SCr 1.88 [09-08 @ 20:42]    Urinalysis - [09-09-20 @ 07:23]      Color Light Yellow / Appearance Clear / SG 1.014 / pH 6.0      Gluc Trace / Ketone Negative  / Bili Negative / Urobili Negative       Blood Trace / Protein Trace / Leuk Est Negative / Nitrite Negative      RBC 2 / WBC 0 / Hyaline 4 / Gran  / Sq Epi  / Non Sq Epi 0 / Bacteria Negative      HbA1c 8.9      [12-16-19 @ 08:15]  TSH 1.74      [09-09-20 @ 07:25]    HCV 0.09, Nonreact      [12-16-19 @ 08:36]

## 2020-09-09 NOTE — PROVIDER CONTACT NOTE (CHANGE IN STATUS NOTIFICATION) - BACKGROUND
Hx EF 10%, AICD, DM, COPD, CAD. Presented to the ED with SOB and fevers x2 days. Patient became hypotensive in ED. 750mL of fluid given before patient became increasingly dyspneic.. CT Abdomen showed hydronephrosis. Urology to be consulted.

## 2020-09-09 NOTE — H&P ADULT - ASSESSMENT
Neuro  - AOx3, no active issues    Respiratory  - Tree and bud opacities on CT scan. Will     Cardiovascular  #Hypotension secondary to Septic Shock  - POCUS positive for B-lines, however no overt signs of pulmonary edema on CT Scan  - Cautious use IVFs  - Levophed, titrate to MAP of 65.  - Midodrine 10 q 8 to ween pressor support    GI  - NPO     Renal  #Hydonephrosis  - Hydronephrosis on CT Scan    Hematologic  -     ID  #Septic Shock from unclear source  - Empiric treatment for CAP with Ceftriaxone and Azithromycin. Will check Urine Legionella  -     ENDOCRINE:   - No active issues    F/E/N:  -     Ethics  - GOC discussed Patient wants full code.    Kirsten Ferguson  PGY-3 Internal Medicine  Pager: [phone] (NS)/[phone] ([facility])    DVT Prophylaxis  - Assessment: 59M PMH T2DM, HTN, CAD/MI s/p stents (most recent 2/2018), HFrEF (10-15%), ICD,  ischemic cardiomyopathy, COPD, HTN, GERD pw fever, n/v, diarrhea, back pain x 1 day, septic upon admission, admitted to MICU for management of hypotension requiring pressor support, found to have R hydronephrosis on CT potentially source of infxn.    Neuro  - AOx3, no active issues    Respiratory  - Small R pleural collection on CT, CXR with clr lungs (preliminary reads)  - Pt w/ SOB after episode of emesis in addition to hx of dry cough  - Will continue home medication albuterol sulfate hfa 90mcg 2 puffs BID    Cardiovascular  #Hypotension secondary to Septic Shock  - POCUS positive for B-lines, however no overt signs of pulmonary edema on CT Scan  - Cautious use IVFs  - Levophed, titrate to MAP of 65.  - Midodrine 10 q 8 to ween pressor support    GI/NUTRITION  - Pt w/ episode of diarrhea without abd pain  - Stool Cx, GI PCR sent  - NPO     Renal  #Hydonephrosis  - Atrophied R kidney w/ chronic R hydronephrosis which was also partially imaged on CT Chest from 11/11/2019  - Urology consulted w/ concern for possible infxn source. Assessed pt, will give recs in am  - UA without signs of infxn  - BUN/SCr 32/1.88 (1.4-1.8 during 12/2019 admission)    Hematologic  -     ID  #Septic Shock from unclear source  - Empiric treatment for CAP with Ceftriaxone and Azithromycin. Will check Urine Legionella  -     ENDOCRINE:   - No active issues    F/E/N:  -     Ethics  - [facility] discussed Patient wants full code.    Kirsten Ferguson  PGY-3 Internal Medicine  Pager: [phone] (NS)/[phone] ([facility])    DVT Prophylaxis  - Assessment: 59M PMH T2DM, HTN, CAD/MI s/p stents (most recent 2/2018), HFrEF (10-15%), ICD,  ischemic cardiomyopathy, COPD, HTN, GERD pw fever, n/v, diarrhea, back pain x 1 day, septic upon admission, admitted to MICU for management of hypotension requiring pressor support, found to have R hydronephrosis on CT potentially source of infxn.    Neuro  - AOx3, no active issues    Respiratory  - Small R pleural collection on CT, CXR with clr lungs (preliminary reads)  - Pt w/ SOB after episode of emesis in addition to hx of dry cough  - Will continue home medication albuterol sulfate hfa 90mcg 2 puffs BID    Cardiovascular  #Hypotension secondary to Septic Shock  - POCUS positive for B-lines, however no overt signs of pulmonary edema on CT Scan  - Cautious use IVFs  - Levophed, titrate to MAP of 65.  - Midodrine 10 q 8 to ween pressor support  - Gave lasix 80mg IV x 1 as pt SOB  - Holding home meds including metoprolol succinate 100mg, entresto 49/51 BID, furosemide 80mg, indapamide 1.25 mg    GI/NUTRITION  - Pt w/ episode of diarrhea without abd pain, may be indicative of gastroenteritis as source of infxn  - Stool Cx, GI PCR sent  - NPO     Renal  #Hydonephrosis  - Atrophied R kidney w/ chronic R hydronephrosis which was also partially imaged on CT Chest from 11/11/2019  - Urology consulted w/ concern for possible infxn source. Assessed pt, will give recs in am  - UA without signs of infxn  - BUN/SCr 32/1.88 (1.4-1.8 during 12/2019 admission)  - Diuresed w/ IV lasix 80mg as above as pt SOB    Hematologic  #Thrombocytopenia  - Pt w/ thrombocytopenia to 146 (166-200 during 12/2019 admission)    #DVT ppx  - Continue home clopidogrel 75mg and ASA 81mg  -     ID  #Septic Shock from unclear source  - Empiric treatment for CAP with Ceftriaxone. Will check Urine Legionella  - Stool Cx/GI PCR sent as above  - Urology consulted for R hydronephrosis as above    ENDOCRINE:   - Pt w/ hx of IDDM on home Lantus 74u qhs and Humalog 50u pre-meal per pt  - Will monitor FSS with ISS    F/E/N:  -     Ethics  - GOC discussed Patient wants full code.    Kirsten Ferguson  PGY-3 Internal Medicine  Pager: [phone] (NS)/[phone] ([facility]) Assessment: 59M PMH T2DM, HTN, CAD/MI s/p stents (most recent 2/2018), HFrEF (10-15%), ICD,  ischemic cardiomyopathy, COPD, HTN, GERD, BPH pw fever, n/v, diarrhea, back pain x 1 day, septic upon admission, admitted to MICU for management of hypotension requiring pressor support, found to have R hydronephrosis on CT potentially source of infxn.    Neuro  - AOx3, no active issues    Respiratory  - Small R pleural collection on CT, CXR with clr lungs (preliminary reads)  - Pt w/ SOB after episode of emesis in addition to hx of dry cough  - Will continue home albuterol prn    Cardiovascular  #Hypotension secondary to Septic Shock  - POCUS positive for B-lines, however no overt signs of pulmonary edema on CT Scan  - Cautious use IVFs  - Levophed, titrate to MAP of 65.  - Midodrine 10 q 8 to ween pressor support  - Gave lasix 80mg IV x 1 as pt SOB  - Holding home metoprolol, entresto, furosemide, indapamide  - Diuresing as needed    GI/NUTRITION  - Pt w/ episode of diarrhea without abd pain, may be indicative of gastroenteritis as source of infxn  - Stool Cx, GI PCR sent  - Continuing home pantoprazole po 40mg  - NPO pending potential urologic intervention, will f/u in am    Renal  #Hydonephrosis  - Atrophied R kidney w/ chronic R hydronephrosis which was also partially imaged on CT Chest from 11/11/2019  - Urology consulted w/ concern for possible infxn source. Assessed pt, will give recs in am  - UA without signs of infxn  - BUN/SCr 32/1.88 (1.4-1.8 during 12/2019 admission)  - Diuresed w/ IV lasix 80mg as above as pt SOB  - Holding home diuretics and will diurese as needed as above    #BPH  - continuing home tamsulosin 0.4mg    Hematologic  #Thrombocytopenia  - Pt w/ thrombocytopenia to 146 (166-200 during 12/2019 admission)    #DVT ppx  - Continue home clopidogrel 75mg and ASA 81mg  - Holding chemical DVT ppx pending urologic evaluation, will place on SCDs for DVT ppx    ID  #Septic Shock from unclear source  - Empiric treatment for CAP with Ceftriaxone. Will check Urine Legionella  - Stool Cx/GI PCR sent as above  - Urology consulted for R hydronephrosis as above, will appreciate recs    ENDOCRINE:   - Pt w/ hx of IDDM on home Lantus 74u qhs and Humalog 50u pre-meal per pt  - Will monitor FSS with ISS    F/E/N:  - Judicious use of fluids due to HF as above  - No active issues otherwise    Ethics  - GOC discussed Patient wants full code.    Kirsten Ferguson  PGY-3 Internal Medicine  Pager: [phone] (NS)/[phone] ([facility]) Assessment: 59M PMH T2DM, HTN, CAD/MI s/p stents (most recent 2/2018), HFrEF (10-15%), ICD,  ischemic cardiomyopathy, COPD, HTN, GERD, BPH pw fever, n/v, diarrhea, back pain x 1 day, septic upon admission, admitted to MICU for management of hypotension requiring pressor support, found to have R hydronephrosis on CT potentially source of infxn.    Neuro  - AOx3, no active issues    Respiratory  - Small R pleural collection on CT, CXR with clr lungs (preliminary reads)  - Pt w/ SOB after episode of emesis in addition to hx of dry cough  - Will continue home albuterol prn    Cardiovascular  #Hypotension secondary to Septic Shock  - POCUS positive for B-lines, however no overt signs of pulmonary edema on CT Scan  - Cautious use IVFs  - Levophed, titrate to MAP of 65.  - Midodrine 10 q 8 to ween pressor support  - Gave lasix 80mg IV x 1 as pt SOB  - Holding home metoprolol, entresto, furosemide, indapamide  - Diuresing as needed    GI/NUTRITION  - Pt w/ episode of diarrhea without abd pain, may be indicative of gastroenteritis as source of infxn  - Stool Cx, GI PCR sent  - Continuing home pantoprazole po 40mg  - NPO pending potential urologic intervention, will f/u in am    Renal  #Hydonephrosis  - Atrophied R kidney w/ chronic R hydronephrosis which was also partially imaged on CT Chest from 11/11/2019  - Urology consulted w/ concern for possible infxn source. Assessed pt, will give recs in am  - UA without signs of infxn  - BUN/SCr 32/1.88 (1.4-1.8 during 12/2019 admission)  - Diuresed w/ IV lasix 80mg as above as pt SOB  - Pt w/ poor UOP, bladder scan revealing > 300cc, will place Peguero catheter  - Holding home diuretics and will diurese as needed as above    #BPH  - continuing home tamsulosin 0.4mg    Hematologic  #Thrombocytopenia  - Pt w/ thrombocytopenia to 146 (166-200 during 12/2019 admission)    #DVT ppx  - Continue home clopidogrel 75mg and ASA 81mg  - Holding chemical DVT ppx pending urologic evaluation, will place on SCDs for DVT ppx    ID  #Septic Shock from unclear source  - Empiric treatment for CAP with Ceftriaxone. Will check Urine Legionella  - Stool Cx/GI PCR sent as above  - Urology consulted for R hydronephrosis as above, will appreciate recs    ENDOCRINE:   - Pt w/ hx of IDDM on home Lantus 74u qhs and Humalog 50u pre-meal per pt  - Will monitor FSS with ISS    F/E/N:  - Judicious use of fluids due to HF as above  - No active issues otherwise    Ethics  - GOC discussed Patient wants full code.    Kirsten Ferguson  PGY-3 Internal Medicine  Pager: [phone] (NS)/[phone] ([facility]) Assessment: 59M PMH T2DM, HTN, CAD/MI s/p stents (most recent 2/2018), HFrEF (10-15%), ICD,  ischemic cardiomyopathy, COPD, HTN, GERD, BPH pw fever, n/v, diarrhea, back pain x 1 day, septic upon admission, admitted to MICU for management of hypotension requiring pressor support, found to have R hydronephrosis on CT potentially source of infxn.    Neuro  - AOx3, no active issues    Respiratory  - Small R pleural collection on CT, CXR with clr lungs (preliminary reads)  - Pt w/ SOB after episode of emesis in addition to hx of dry cough  - Will continue home albuterol prn    Cardiovascular  #Hypotension secondary to Septic Shock  - POCUS positive for B-lines, however no overt signs of pulmonary edema on CT Scan  - Cautious use IVFs  - Levophed, titrate to MAP of 65.  - Midodrine 10 q 8 to ween pressor support  - Gave lasix 80mg IV x 1 as pt SOB  - Holding home metoprolol, entresto, furosemide, indapamide  - Diuresing as needed    GI/NUTRITION  - Pt w/ episode of diarrhea without abd pain, may be indicative of gastroenteritis as source of infxn  - Stool Cx, GI PCR sent  - Continuing home pantoprazole po 40mg  - NPO pending potential urologic intervention, will f/u in am    Renal  #Hydonephrosis  - Atrophied R kidney w/ chronic R hydronephrosis which was also partially imaged on CT Chest from 11/11/2019  - Urology consulted w/ concern for possible infxn source. Assessed pt, will give recs in am  - UA without signs of infxn  - BUN/SCr 32/1.88 (1.4-1.8 during 12/2019 admission)  - Diuresed w/ IV lasix 80mg as above as pt SOB  - Pt w/ poor UOP, bladder scan revealing > 300cc, will place Peguero catheter  - Holding home diuretics and will diurese as needed as above    #BPH  - continuing home tamsulosin 0.4mg  - Peguero as above    Hematologic  #Thrombocytopenia  - Pt w/ thrombocytopenia to 146 (166-200 during 12/2019 admission)  - Will continue to monitor CBC    #DVT ppx  - Continue home clopidogrel 75mg and ASA 81mg  - Holding chemical DVT ppx pending urologic evaluation, will place on SCDs for DVT ppx    ID  #Septic Shock from unclear source  - Empiric treatment for CAP with Ceftriaxone. Will check Urine Legionella  - Stool Cx/GI PCR sent as above  - Urology consulted for R hydronephrosis as above, will appreciate recs    ENDOCRINE:   - Pt w/ hx of IDDM on home Lantus 74u qhs and Humalog 50u pre-meal per pt  - Will monitor FSS with ISS    F/E/N:  - Judicious use of fluids due to HF as above  - No active issues otherwise    Ethics  - GOC discussed Patient wants full code.    Kirsten Ferguson  PGY-3 Internal Medicine  Pager: [phone] (NS)/[phone] ([facility]) Assessment: 59M PMH T2DM, HTN, CAD/MI s/p stents (most recent 2/2018), HFrEF (10-15%), ICD,  ischemic cardiomyopathy, COPD, HTN, GERD, BPH pw fever, n/v, diarrhea, back pain x 1 day, septic upon admission, admitted to MICU for management of hypotension requiring pressor support, found to have R hydronephrosis on CT likely source of infxn.    Neuro  - AOx3, no active issues    Respiratory  - Small R pleural collection on CT, CXR with clr lungs (preliminary reads)  - Pt w/ SOB after episode of emesis in addition to hx of dry cough  - Will continue home albuterol prn  - Pt satting well on NC     Cardiovascular  #Hypotension secondary to Septic Shock  - POCUS positive for B-lines, however no overt signs of pulmonary edema on CT Scan  - Cautious use IVFs  - Levophed, titrate to MAP of 65.  - Midodrine 10 q 8 to ween pressor support  - Gave lasix 80mg IV x 1 as pt SOB  - Holding home metoprolol, entresto, furosemide, indapamide  - Diuresing as needed    GI/NUTRITION  - Pt w/ episode of diarrhea without abd pain, may be indicative of gastroenteritis as source of infxn  - Stool Cx, GI PCR sent  - Continuing home pantoprazole po 40mg  - NPO pending potential urologic intervention, will f/u in am    Renal  #Hydonephrosis  - Atrophied R kidney w/ chronic R hydronephrosis which was also partially imaged on CT Chest from 11/11/2019  - Urology consulted w/ concern for possible infxn source. Assessed pt, will give recs in am  - UA without signs of infxn  - BUN/SCr 32/1.88 (1.4-1.8 during 12/2019 admission)  - Diuresed w/ IV lasix 80mg as above as pt SOB  - Pt w/ poor UOP, bladder scan revealing > 300cc, will place Peguero catheter  - Holding home diuretics and will diurese as needed as above    #BPH  - continuing home tamsulosin 0.4mg  - Peguero as above    Hematologic  #Thrombocytopenia  - Pt w/ thrombocytopenia to 146 (166-200 during 12/2019 admission)  - Will continue to monitor CBC    #DVT ppx  - Continue home clopidogrel 75mg and ASA 81mg  - Holding chemical DVT ppx pending urologic evaluation, will place on SCDs for DVT ppx    ID  #Septic Shock from suspected  source vs gastroenteritis  - Empiric tx for  source w/ Ceftriaxone. Given hydronephrosis, suspect pt may have had renal stone.   - Will check Urine Legionella to r/o atypical PNA given hypoxia  - Stool Cx/GI PCR sent as above  - Urology consulted for R hydronephrosis as above, will appreciate recs    ENDOCRINE:   - Pt w/ hx of IDDM on home Lantus 74u qhs and Humalog 50u pre-meal per pt  - Will monitor FSS with ISS    F/E/N:  - Judicious use of fluids due to HF as above  - No active issues otherwise    Ethics  - [facility] discussed Patient wants full code.    Kirsten Ferguson  PGY-3 Internal Medicine  Pager: [phone] (NS)/[phone] ([facility]) Assessment: 59M PMH T2DM, HTN, CAD/MI s/p stents (most recent 2/2018), HFrEF (10-15%), ICD,  ischemic cardiomyopathy, COPD, HTN, GERD, BPH pw fever, n/v, diarrhea, back pain x 1 day, septic upon admission, admitted to MICU for management of hypotension requiring pressor support, found to have R hydronephrosis on CT likely source of infxn.    Neuro  - AOx3, no active issues    Respiratory  - Small R pleural collection on CT, CXR with clr lungs (preliminary reads)  - Pt w/ SOB after episode of emesis in addition to hx of dry cough  - Will continue home albuterol prn  - Pt satting well on NC     Cardiovascular  #Hypotension secondary to Septic Shock  - POCUS positive for B-lines, however no overt signs of pulmonary edema on CT Scan  - Cautious use IVFs  - Levophed, titrate to MAP of 65.  - Midodrine 10 q 8 to ween pressor support  - Gave lasix 80mg IV x 1 as pt SOB  - Holding home metoprolol, entresto, furosemide, indapamide  - Diuresing as needed    GI/NUTRITION  - Pt w/ episode of diarrhea without abd pain, may be indicative of gastroenteritis as source of infxn  - Stool Cx, GI PCR sent  - Continuing home pantoprazole po 40mg  - NPO pending potential urologic intervention, will f/u in am    Renal  #Hydonephrosis  - Atrophied R kidney w/ chronic R hydronephrosis which was also partially imaged on CT Chest from 11/11/2019  - Urology consulted w/ concern for possible infxn source. Assessed pt, will give recs in am  - UA without signs of infxn  - BUN/SCr 32/1.88 (1.4-1.8 during 12/2019 admission)  - Diuresed w/ IV lasix 80mg as above as pt SOB  - Pt w/ poor UOP, bladder scan revealing > 300cc, will place Peguero catheter  - Holding home diuretics and will diurese as needed as above    #BPH  - continuing home tamsulosin 0.4mg  - Peguero as above    Hematologic  #Thrombocytopenia  - Pt w/ thrombocytopenia to 146 (166-200 during 12/2019 admission)  - Will continue to monitor CBC    #DVT ppx  - Continue home clopidogrel 75mg and ASA 81mg  - Holding chemical DVT ppx pending urologic evaluation, will place on SCDs for DVT ppx    ID  #Septic Shock from suspected  source vs gastroenteritis  - Empiric tx for  source w/ Ceftriaxone. Given hydronephrosis, suspect pt may have had renal stone.   - Will check Urine Legionella to r/o atypical PNA given hypoxia  - Stool Cx/GI PCR sent as above  - Urology consulted for R hydronephrosis as above, will appreciate recs    ENDOCRINE:   - Pt w/ hx of IDDM on home Lantus 74u qhs and Humalog 50u pre-meal per pt  - Gave pt 30u Lantus stat in am after missing his nighttime dose, holding premeals as pt NPO.   - Dosed for 50u Lantus qhs 9/9  - Will monitor FSS with moderate ISS    F/E/N:  - Judicious use of fluids due to HF as above  - No active issues otherwise    Ethics  - C discussed Patient wants full code.    Kirsten Ferguson  PGY-3 Internal Medicine  Pager: [phone] ([facility])/[id number] ([facility])

## 2020-09-09 NOTE — CONSULT NOTE ADULT - ASSESSMENT
59M PMH T2DM, HTN, CAD/MI s/p stents (most recent 2/2018), HFrEF (10-15%), ICD,  ischemic cardiomyopathy, COPD, HTN, GERD pw fever, n/v, back pain IN ED was found to have T 102.9,, BP 63/40  elevated BUN/SCr 32/1.88, hyperglycemic 247, elevated proBNP 1158, lactate Started on levophed. hypotension requiring pressor support.  now with manjit as well    1- MANJIT on ckd III likely   2- right hydro and atrophy kidney   3- fevers  4- hypotension   5- bacteremia       manjit in setting of infection likely   need renal sono to evaluate right kidney lesion   gentle ivf hydration   rocephin 1 g iv qd  midodrine 10 mg tid   d/w ICU team when seen earlier.

## 2020-09-09 NOTE — PROVIDER CONTACT NOTE (CHANGE IN STATUS NOTIFICATION) - ACTION/TREATMENT ORDERED:
Full set of labs including another UA and Fecal PCR ordered. 80mg lasix ordered. Insert tinoco. NPO. Urology consulted. Apply 2L NC.

## 2020-09-09 NOTE — ED ADULT NURSE REASSESSMENT NOTE - NS ED NURSE REASSESS COMMENT FT1
Per MICU team, would like to have levophed drip paused for 30 minutes and watch patient. If patient's MAP goes below 65, would like to give midodrine.

## 2020-09-09 NOTE — H&P ADULT - NSHPLABSRESULTS_GEN_ALL_CORE
136  |  97  |  32<H>  ----------------------------<  247<H>  3.5   |  22  |  1.88<H>    Ca    9.4      08 Sep 2020 20:42    TPro  7.4  /  Alb  4.1  /  TBili  0.6  /  DBili  x   /  AST  33  /  ALT  36  /  AlkPhos  70            PT/INR - ( 08 Sep 2020 20:42 )   PT: 13.7 sec;   INR: 1.16 ratio         PTT - ( 08 Sep 2020 20:42 )  PTT:31.4 sec              Urinalysis Basic - ( 08 Sep 2020 23:31 )    Color: Yellow / Appearance: Clear / S.017 / pH: x  Gluc: x / Ketone: Negative  / Bili: Negative / Urobili: Negative   Blood: x / Protein: 30 mg/dL / Nitrite: Negative   Leuk Esterase: Negative / RBC: 2 /hpf / WBC 1 /HPF   Sq Epi: x / Non Sq Epi: 1 /hpf / Bacteria: Negative                              13.1   10.28 )-----------( 146      ( 08 Sep 2020 20:42 )             40.5     CAPILLARY BLOOD GLUCOSE

## 2020-09-09 NOTE — CONSULT NOTE ADULT - SUBJECTIVE AND OBJECTIVE BOX
Patient is a 59y old  Male who presents with a chief complaint of fever, cough, emesis (09 Sep 2020 16:03)    HPI:  59M PMH T2DM, HTN, CAD/MI s/p stents (most recent 2018), HFrEF (10-15%), ICD,  ischemic cardiomyopathy, COPD, HTN, GERD pw fever, n/v, back pain x 1 day. Pt states that he was in his baseline state of health when he began to experience back pain while grocery shopping yesterday. He describes sudden onset left lower back pain, palliated by sitting upright, of sharp quality, without radiation, of 7/10 severity, which completely resolved when presenting to ED. After returning home from shopping he experienced chills but did not measure his temperature. After drinking 1 bottle of water he felt nauseous and had an episode of NBNB emesis, with resolved nausea afterward. He reports one instance of cloudy/dark urine while admitted, but denies dysuria, hematuria, change in frequency or foul odor. In addition he reports cough without sputum production. He denies HA, weakness, confusion, sinus congestion, CP, abd pain, dysuria, hematuria, diarrhea, or myalgias.     ED Course:  VS: febrile 102.9, HR , BP 63/40 to 105/62, RR 17-25, SpO2%  on RA  Labs: significant for WBC 10.28, thrombocytopenia 146, elevated BUN/SCr 32/1.88, hyperglycemic 247, elevated proBNP 1158, lactate wnl  Micro: COVID-19 negative, RVP negative, UA not concerning for infxn  Imaging: CXR: clr lungs (prelim read)  Orders: received po tylenol 975mg, po motrin 600mg, IV azithromycin 500mg, IV ceftriaxone 1000mg. Midodrine 10mg x 1 received and ordered for 10mg q8h. Started on levophed. Received 500 cc bolus of LR and 500 cc bolus of NS. Urine and Blood Cx sent, CT A/P, chest, non contrast ordered.     Pt admitted to MICU for management of hypotension requiring pressor support. While in ICU experienced emesis and diarrhea (without melena/hematochezia) x 1 with SOB. (09 Sep 2020 02:30)       prior hospital charts reviewed [V]  primary team notes reviewed [V]  other consultant notes reviewed [V]    PAST MEDICAL & SURGICAL HISTORY:  H/O gastroesophageal reflux (GERD)  History of COPD  History of ischemic cardiomyopathy  Heart failure with reduced ejection fraction  Hypertension  AICD (automatic cardioverter/defibrillator) present  Diabetes  Stented coronary artery  H/O vasectomy: 20 yrs ago ()      SOCIAL HISTORY:    Reports 40 pack year tobacco history, quit 1 year ago. Denies EtOH or illicit drug use. Denies current sexual activity or hx of STI. Lives in house w/ wife. Performs ADLs independently, does not use cane/walker or other assistance to ambulate.    FAMILY HISTORY:  Family history of cardiac disorder: Paternal  Family history of COPD (chronic obstructive pulmonary disease) (Sibling)    Allergies  No Known Allergies      ANTIMICROBIALS:      ANTIMICROBIALS (past 90 days):  MEDICATIONS  (STANDING):    azithromycin  IVPB   250 mL/Hr IV Intermittent (20 @ 20:58)    cefTRIAXone   IVPB   100 mL/Hr IV Intermittent (20 @ 20:42)    vancomycin  IVPB   300 mL/Hr IV Intermittent (20 @ 14:18)        OTHER MEDS:   MEDICATIONS  (STANDING):  acetaminophen   Tablet .. 650 every 6 hours PRN  ALBUTerol    90 MICROgram(s) HFA Inhaler 2 every 12 hours PRN  aspirin  chewable 81 daily  atorvastatin 80 at bedtime  clopidogrel Tablet 75 daily  dextrose 40% Gel 15 once PRN  dextrose 50% Injectable 12.5 once  dextrose 50% Injectable 25 once  dextrose 50% Injectable 25 once  glucagon  Injectable 1 once PRN  influenza   Vaccine 0.5 once  insulin glargine Injectable (LANTUS) 50 at bedtime  insulin lispro (HumaLOG) corrective regimen sliding scale  three times a day before meals  insulin lispro (HumaLOG) corrective regimen sliding scale  at bedtime  insulin lispro Injectable (HumaLOG) 10 three times a day before meals  loratadine 10 daily  metoprolol tartrate 25 two times a day  midodrine 10 every 8 hours  pantoprazole    Tablet 40 before breakfast  tamsulosin 0.4 at bedtime      REVIEW OF SYSTEMS  [  ] ROS unobtainable because:    [  ] All other systems negative except as noted below:	    Constitutional:  [ ] fever [ ] chills  [ ] weight loss  [ ] weakness  Skin:  [ ] rash [ ] phlebitis	  Eyes: [ ] icterus [ ] pain  [ ] discharge	  ENMT: [ ] sore throat  [ ] thrush [ ] ulcers [ ] exudates  Respiratory: [ ] dyspnea [ ] hemoptysis [ ] cough [ ] sputum	  Cardiovascular:  [ ] chest pain [ ] palpitations [ ] edema	  Gastrointestinal:  [ ] nausea [ ] vomiting [ ] diarrhea [ ] constipation [ ] pain	  Genitourinary:  [ ] dysuria [ ] frequency [ ] hematuria [ ] discharge [ ] flank pain  [ ] incontinence  Musculoskeletal:  [ ] myalgias [ ] arthralgias [ ] arthritis  [ ] back pain  Neurological:  [ ] headache [ ] seizures  [ ] confusion/altered mental status  Psychiatric:  [ ] anxiety [ ] depression	  Hematology/Lymphatics:  [ ] lymphadenopathy  Endocrine:  [ ] adrenal [ ] thyroid  Allergic/Immunologic:	 [ ] transplant [ ] seasonal    VITALS:  Vital Signs Last 24 Hrs  T(F): 100.7 (20 @ 16:00), Max: 102.9 (20 @ 19:54)    Vital Signs Last 24 Hrs  HR: 113 (20 @ 16:00) (90 - 120)  BP: 114/66 (20 @ 16:00) (63/40 - 142/72)  RR: 18 (20 @ 16:00)  SpO2: 95% (20 @ 16:00) (90% - 100%)  Wt(kg): --    EXAM:  GA: NAD  HEENT: oral cavity no lesion  CV: nl S1/S2, no RMG  Lungs: CTAB  Abd: BS+, soft, nontender, no rebounding pain  Ext: no edema  Skin:  IV: no phlebitis    Labs:                        12.8   11.16 )-----------( 116      ( 09 Sep 2020 04:33 )             39.4         132<L>  |  94<L>  |  40<H>  ----------------------------<  416<H>  3.3<L>   |  22  |  2.02<H>    Ca    8.9      09 Sep 2020 14:20  Phos  2.9       Mg     1.8         TPro  7.0  /  Alb  3.9  /  TBili  0.7  /  DBili  x   /  AST  41<H>  /  ALT  41  /  AlkPhos  66        WBC Trend:  WBC Count: 11.16 (20 @ 04:33)  WBC Count: 10.28 (20 @ 20:42)      Auto Neutrophil #: 9.25 K/uL (20 @ 04:33)  Auto Neutrophil #: 8.39 K/uL (20 @ 20:42)  Auto Neutrophil #: 8.76 K/uL (19 @ 08:15)  Auto Neutrophil #: 5.42 K/uL (12-15-19 @ 18:45)      Creatine Trend:  Creatinine, Serum: 2.02 ()  Creatinine, Serum: 2.07 ()  Creatinine, Serum: 1.88 ()      Liver Biochemical Testing Trend:  Alanine Aminotransferase (ALT/SGPT): 41 ()  Alanine Aminotransferase (ALT/SGPT): 36 ()  Aspartate Aminotransferase (AST/SGOT): 41 (20 @ 04:36)  Aspartate Aminotransferase (AST/SGOT): 33 (20 @ 20:42)  Bilirubin Total, Serum: 0.7 ()  Bilirubin Total, Serum: 0.6 ()      Trend LDH      Auto Eosinophil %: 0.0 % (20 @ 04:33)  Auto Eosinophil %: 0.0 % (20 @ 20:42)      Urinalysis Basic - ( 09 Sep 2020 07:23 )    Color: Light Yellow / Appearance: Clear / S.014 / pH: x  Gluc: x / Ketone: Negative  / Bili: Negative / Urobili: Negative   Blood: x / Protein: Trace / Nitrite: Negative   Leuk Esterase: Negative / RBC: 2 /hpf / WBC 0 /HPF   Sq Epi: x / Non Sq Epi: 0 /hpf / Bacteria: Negative        MICROBIOLOGY:      GI PCR Panel, Stool (collected 09 Sep 2020 10:21)  Source: .Stool Feces sarina garcia  Final Report:    GI PCR Results: NOT detected    *******Please Note:*******    GI panel PCR evaluates for:    Campylobacter, Plesiomonas shigelloides, Salmonella,    Vibrio, Yersinia enterocolitica, Enteroaggregative    Escherichia coli (EAEC), Enteropathogenic E.coli (EPEC),    Enterotoxigenic E. coli (ETEC) lt/st, Shiga-like    toxin-producing E. coli (STEC) stx1/stx2,    Shigella/ Enteroinvasive E. coli (EIEC), Cryptosporidium,    Cyclospora cayetanensis, Entamoeba histolytica,    Giardia lamblia, Adenovirus F 40/41, Astrovirus,    Norovirus GI/GII, Rotavirus A, Sapovirus    Culture - Blood (collected 09 Sep 2020 00:57)  Source: .Blood Blood-Peripheral  Preliminary Report:    Growth in aerobic and anaerobic bottles: Gram Positive Cocci in Pairs and    Chains    Culture - Blood (collected 09 Sep 2020 00:57)  Source: .Blood Blood-Peripheral  Preliminary Report:    Growth in anaerobic bottle: Gram Positive Cocci in Pairs and Chains    Growth in aerobic bottle: Gram Positive Cocci in Pairs and Chains    "Due to technical problems, Proteus sp. will Not be reported as part of    the BCID panel until further notice"    ***Blood Panel PCR results on this specimen are available    approximately 3 hours after the Gram stain result.***    Gram stain, PCR, and/or culture results may not always    correspond due to difference in methodologies.    ************************************************************    This PCR assay was performed using [employer].    The following targets are tested for: Enterococcus,    vancomycin resistant enterococci, Listeria monocytogenes,    coagulase negative staphylococci, S. aureus,    methicillin resistant S. aureus, Streptococcus agalactiae    (Group B), S. pneumoniae, S. pyogenes (Group A),    Acinetobacter baumannii, Enterobacter cloacae, E. coli,    Klebsiella oxytoca, K. pneumoniae, Proteus sp.,    Serratia marcescens, Haemophilus influenzae,    Neisseria meningitidis, Pseudomonas aeruginosa, Candida    albicans, C. glabrata, C krusei, C parapsilosis,    C. tropicalis and the KPC resistance gene.  Organism: Blood Culture PCR  Organism: Blood Culture PCR    Sensitivities:      -  Streptococcus sp. (Not Grp A, B or S pneumoniae): Detec      Method Type: PCR    Legionella Antigen, Urine: Negative ( @ 09:11)  Rapid RVP Result: NotDetec ( @ 22:38)      OTHER TESTS:  COVID-19 PCR: NotDetec (20 @ 22:38)  Blood Gas Venous - Lactate: 1.8 ( @ 20:42)  A1C with Estimated Average Glucose Result: 11.2 % (20 @ 11:26)      RADIOLOGY:  imaging below personally reviewed    < from: CT Chest No Cont (20 @ 01:27) >  IMPRESSION:  No pneumonia.    Emphysema.    No bowel or obstruction.    Hepatomegaly and diffuse hepatic steatosis.    Splenomegaly.    Atrophic right kidney, with severe hydronephrosis. A 0.7 cm soft tissue density is noted in the interpolar region of the right kidney and is incompletely characterized. Ultrasound may be helpful for further evaluation.    < end of copied text > Patient is a 59y old  Male who presents with a chief complaint of fever, cough, emesis (09 Sep 2020 16:03)    HPI:  59M PMH T2DM, HTN, CAD/MI s/p stents (most recent 2018), HFrEF (10-15%), ICD,  ischemic cardiomyopathy, COPD, HTN, GERD pw fever, n/v, back pain x 1 day. Pt states that he was in his baseline state of health when he began to experience back pain while grocery shopping yesterday. He describes sudden onset left lower back pain, palliated by sitting upright, of sharp quality, without radiation, of 7/10 severity, which completely resolved when presenting to ED. After returning home from shopping he experienced chills but did not measure his temperature. After drinking 1 bottle of water he felt nauseous and had an episode of NBNB emesis, with resolved nausea afterward. He reports one instance of cloudy/dark urine while admitted, but denies dysuria, hematuria, change in frequency or foul odor. In addition he reports cough without sputum production. He denies HA, weakness, confusion, sinus congestion, CP, abd pain, dysuria, hematuria, diarrhea, or myalgias.     ED Course:  VS: febrile 102.9, HR , BP 63/40 to 105/62, RR 17-25, SpO2%  on RA  Labs: significant for WBC 10.28, thrombocytopenia 146, elevated BUN/SCr 32/1.88, hyperglycemic 247, elevated proBNP 1158, lactate wnl  Micro: COVID-19 negative, RVP negative, UA not concerning for infxn  Imaging: CXR: clr lungs (prelim read)  Orders: received po tylenol 975mg, po motrin 600mg, IV azithromycin 500mg, IV ceftriaxone 1000mg. Midodrine 10mg x 1 received and ordered for 10mg q8h. Started on levophed. Received 500 cc bolus of LR and 500 cc bolus of NS. Urine and Blood Cx sent, CT A/P, chest, non contrast ordered.     Pt admitted to MICU for management of hypotension requiring pressor support. While in ICU experienced emesis and diarrhea (without melena/hematochezia) x 1 with SOB. (09 Sep 2020 02:30)     Pt looks well, only complaint is feeling tired. No more back pain.  He denies any recent dental work. No pain right now.  He c/o ICD site "muscle pain" on and off since 1 month ago. Today there is no pain when pressed on the ICD.    prior hospital charts reviewed [V]  primary team notes reviewed [V]  other consultant notes reviewed [V]    PAST MEDICAL & SURGICAL HISTORY:  H/O gastroesophageal reflux (GERD)  History of COPD  History of ischemic cardiomyopathy  Heart failure with reduced ejection fraction  Hypertension  AICD (automatic cardioverter/defibrillator) present  Diabetes  Stented coronary artery  H/O vasectomy: 20 yrs ago ()      SOCIAL HISTORY:    Reports 40 pack year tobacco history, quit 1 year ago. Denies EtOH or illicit drug use. Denies current sexual activity or hx of STI. Lives in house w/ wife. Performs ADLs independently, does not use cane/walker or other assistance to ambulate. 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    FAMILY HISTORY:  Family history of cardiac disorder: Paternal  Family history of COPD (chronic obstructive pulmonary disease) (Sibling)    Allergies  No Known Allergies      ANTIMICROBIALS:      ANTIMICROBIALS (past 90 days):  MEDICATIONS  (STANDING):    azithromycin  IVPB   250 mL/Hr IV Intermittent (20 @ 20:58)    cefTRIAXone   IVPB   100 mL/Hr IV Intermittent (20 @ 20:42)    vancomycin  IVPB   300 mL/Hr IV Intermittent (20 @ 14:18)        OTHER MEDS:   MEDICATIONS  (STANDING):  acetaminophen   Tablet .. 650 every 6 hours PRN  ALBUTerol    90 MICROgram(s) HFA Inhaler 2 every 12 hours PRN  aspirin  chewable 81 daily  atorvastatin 80 at bedtime  clopidogrel Tablet 75 daily  dextrose 40% Gel 15 once PRN  dextrose 50% Injectable 12.5 once  dextrose 50% Injectable 25 once  dextrose 50% Injectable 25 once  glucagon  Injectable 1 once PRN  influenza   Vaccine 0.5 once  insulin glargine Injectable (LANTUS) 50 at bedtime  insulin lispro (HumaLOG) corrective regimen sliding scale  three times a day before meals  insulin lispro (HumaLOG) corrective regimen sliding scale  at bedtime  insulin lispro Injectable (HumaLOG) 10 three times a day before meals  loratadine 10 daily  metoprolol tartrate 25 two times a day  midodrine 10 every 8 hours  pantoprazole    Tablet 40 before breakfast  tamsulosin 0.4 at bedtime      REVIEW OF SYSTEMS  [  ] ROS unobtainable because:    [V  ] All other systems negative except as noted below:	    Constitutional:  [V ] fever [V ] chills  [ ] weight loss  [ ] weakness  Skin:  [ ] rash [ ] phlebitis	  Eyes: [ ] icterus [ ] pain  [ ] discharge	  ENMT: [ ] sore throat  [ ] thrush [ ] ulcers [ ] exudates  Respiratory: [ ] dyspnea [ ] hemoptysis [ ] cough [ ] sputum	  Cardiovascular:  [V ] chest pain [ ] palpitations [ ] edema	  Gastrointestinal:  [V ] nausea [ V] vomiting [V ] diarrhea [ ] constipation [ ] pain	  Genitourinary:  [ ] dysuria [ ] frequency [ ] hematuria [ ] discharge [ ] flank pain  [ ] incontinence  Musculoskeletal:  [ ] myalgias [ ] arthralgias [ ] arthritis  [V ] back pain  Neurological:  [ ] headache [ ] seizures  [ ] confusion/altered mental status  Psychiatric:  [ ] anxiety [ ] depression	  Hematology/Lymphatics:  [ ] lymphadenopathy  Endocrine:  [ ] adrenal [ ] thyroid  Allergic/Immunologic:	 [ ] transplant [ ] seasonal    VITALS:  Vital Signs Last 24 Hrs  T(F): 100.7 (20 @ 16:00), Max: 102.9 (20 @ 19:54)    Vital Signs Last 24 Hrs  HR: 113 (20 @ 16:00) (90 - 120)  BP: 114/66 (20 @ 16:00) (63/40 - 142/72)  RR: 18 (20 @ 16:00)  SpO2: 95% (20 @ 16:00) (90% - 100%)  Wt(kg): --    EXAM:  GA: NAD  HEENT: oral cavity no lesion  CV: nl S1/S2, no RMG. No erythema, pain or fluid collection around ICD.  Lungs: decreased breathing sounds b/l  Abd: BS+, distended abd, nontender, no rebounding pain  Ext: edema 2+ to knee  Skin: tatoo+. No rash.  IV: no phlebitis  Peguero in place.    Labs:                        12.8   11.16 )-----------( 116      ( 09 Sep 2020 04:33 )             39.4         132<L>  |  94<L>  |  40<H>  ----------------------------<  416<H>  3.3<L>   |  22  |  2.02<H>    Ca    8.9      09 Sep 2020 14:20  Phos  2.9       Mg     1.8         TPro  7.0  /  Alb  3.9  /  TBili  0.7  /  DBili  x   /  AST  41<H>  /  ALT  41  /  AlkPhos  66        WBC Trend:  WBC Count: 11.16 (20 @ 04:33)  WBC Count: 10.28 (20 @ 20:42)      Auto Neutrophil #: 9.25 K/uL (20 @ 04:33)  Auto Neutrophil #: 8.39 K/uL (20 @ 20:42)  Auto Neutrophil #: 8.76 K/uL (19 @ 08:15)  Auto Neutrophil #: 5.42 K/uL (12-15-19 @ 18:45)      Creatine Trend:  Creatinine, Serum: 2.02 ()  Creatinine, Serum: 2.07 ()  Creatinine, Serum: 1.88 ()      Liver Biochemical Testing Trend:  Alanine Aminotransferase (ALT/SGPT): 41 ()  Alanine Aminotransferase (ALT/SGPT): 36 ()  Aspartate Aminotransferase (AST/SGOT): 41 (20 @ 04:36)  Aspartate Aminotransferase (AST/SGOT): 33 (20 @ 20:42)  Bilirubin Total, Serum: 0.7 ()  Bilirubin Total, Serum: 0.6 ()      Trend LDH      Auto Eosinophil %: 0.0 % (20 @ 04:33)  Auto Eosinophil %: 0.0 % (20 @ 20:42)      Urinalysis Basic - ( 09 Sep 2020 07:23 )    Color: Light Yellow / Appearance: Clear / S.014 / pH: x  Gluc: x / Ketone: Negative  / Bili: Negative / Urobili: Negative   Blood: x / Protein: Trace / Nitrite: Negative   Leuk Esterase: Negative / RBC: 2 /hpf / WBC 0 /HPF   Sq Epi: x / Non Sq Epi: 0 /hpf / Bacteria: Negative        MICROBIOLOGY:      GI PCR Panel, Stool (collected 09 Sep 2020 10:21)  Source: .Stool Feces sarina garcia  Final Report:    GI PCR Results: NOT detected    *******Please Note:*******    GI panel PCR evaluates for:    Campylobacter, Plesiomonas shigelloides, Salmonella,    Vibrio, Yersinia enterocolitica, Enteroaggregative    Escherichia coli (EAEC), Enteropathogenic E.coli (EPEC),    Enterotoxigenic E. coli (ETEC) lt/st, Shiga-like    toxin-producing E. coli (STEC) stx1/stx2,    Shigella/ Enteroinvasive E. coli (EIEC), Cryptosporidium,    Cyclospora cayetanensis, Entamoeba histolytica,    Giardia lamblia, Adenovirus F 40/41, Astrovirus,    Norovirus GI/GII, Rotavirus A, Sapovirus    Culture - Blood (collected 09 Sep 2020 00:57)  Source: .Blood Blood-Peripheral  Preliminary Report:    Growth in aerobic and anaerobic bottles: Gram Positive Cocci in Pairs and    Chains    Culture - Blood (collected 09 Sep 2020 00:57)  Source: .Blood Blood-Peripheral  Preliminary Report:    Growth in anaerobic bottle: Gram Positive Cocci in Pairs and Chains    Growth in aerobic bottle: Gram Positive Cocci in Pairs and Chains    "Due to technical problems, Proteus sp. will Not be reported as part of    the BCID panel until further notice"    ***Blood Panel PCR results on this specimen are available    approximately 3 hours after the Gram stain result.***    Gram stain, PCR, and/or culture results may not always    correspond due to difference in methodologies.    ************************************************************    This PCR assay was performed using [employer].    The following targets are tested for: Enterococcus,    vancomycin resistant enterococci, Listeria monocytogenes,    coagulase negative staphylococci, S. aureus,    methicillin resistant S. aureus, Streptococcus agalactiae    (Group B), S. pneumoniae, S. pyogenes (Group A),    Acinetobacter baumannii, Enterobacter cloacae, E. coli,    Klebsiella oxytoca, K. pneumoniae, Proteus sp.,    Serratia marcescens, Haemophilus influenzae,    Neisseria meningitidis, Pseudomonas aeruginosa, Candida    albicans, C. glabrata, C krusei, C parapsilosis,    C. tropicalis and the KPC resistance gene.  Organism: Blood Culture PCR  Organism: Blood Culture PCR    Sensitivities:      -  Streptococcus sp. (Not Grp A, B or S pneumoniae): Detec      Method Type: PCR    Legionella Antigen, Urine: Negative ( @ 09:11)  Rapid RVP Result: NotDetec ( @ 22:38)      OTHER TESTS:  COVID-19 PCR: NotDetec (20 @ 22:38)  Blood Gas Venous - Lactate: 1.8 ( @ 20:42)  A1C with Estimated Average Glucose Result: 11.2 % (20 @ 11:26)      RADIOLOGY:  imaging below personally reviewed    < from: CT Chest No Cont (20 @ 01:27) >  IMPRESSION:  No pneumonia.    Emphysema.    No bowel or obstruction.    Hepatomegaly and diffuse hepatic steatosis.    Splenomegaly.    Atrophic right kidney, with severe hydronephrosis. A 0.7 cm soft tissue density is noted in the interpolar region of the right kidney and is incompletely characterized. Ultrasound may be helpful for further evaluation.    < end of copied text > Patient is a 59y old  Male who presents with a chief complaint of fever, cough, emesis (09 Sep 2020 16:03)    HPI:  59M PMH T2DM, HTN, CAD/MI s/p stents (most recent 2018), HFrEF (10-15%), ICD,  ischemic cardiomyopathy, COPD, HTN, GERD pw fever, n/v, back pain x 1 day. Pt states that he was in his baseline state of health when he began to experience back pain while grocery shopping yesterday. He describes sudden onset left lower back pain, palliated by sitting upright, of sharp quality, without radiation, of 7/10 severity, which completely resolved when presenting to ED. After returning home from shopping he experienced chills but did not measure his temperature. After drinking 1 bottle of water he felt nauseous and had an episode of NBNB emesis, with resolved nausea afterward. He reports one instance of cloudy/dark urine while admitted, but denies dysuria, hematuria, change in frequency or foul odor. In addition he reports cough without sputum production. He denies HA, weakness, confusion, sinus congestion, CP, abd pain, dysuria, hematuria, diarrhea, or myalgias.     ED Course:  VS: febrile 102.9, HR , BP 63/40 to 105/62, RR 17-25, SpO2%  on RA  Labs: significant for WBC 10.28, thrombocytopenia 146, elevated BUN/SCr 32/1.88, hyperglycemic 247, elevated proBNP 1158, lactate wnl  Micro: COVID-19 negative, RVP negative, UA not concerning for infxn  Imaging: CXR: clr lungs (prelim read)  Orders: received po tylenol 975mg, po motrin 600mg, IV azithromycin 500mg, IV ceftriaxone 1000mg. Midodrine 10mg x 1 received and ordered for 10mg q8h. Started on levophed. Received 500 cc bolus of LR and 500 cc bolus of NS. Urine and Blood Cx sent, CT A/P, chest, non contrast ordered.     Pt admitted to MICU for management of hypotension requiring pressor support. While in ICU experienced emesis and diarrhea (without melena/hematochezia) x 1 with SOB. (09 Sep 2020 02:30)     Pt looks well, only complaint is feeling tired. No more back pain.  He denies any recent dental work. No pain right now.  He c/o ICD site "muscle pain" on and off since 1 month ago. Today there is no pain when pressed on the ICD.  Pt's daughter also has diarrhea over the weekend.    prior hospital charts reviewed [V]  primary team notes reviewed [V]  other consultant notes reviewed [V]    PAST MEDICAL & SURGICAL HISTORY:  H/O gastroesophageal reflux (GERD)  History of COPD  History of ischemic cardiomyopathy  Heart failure with reduced ejection fraction  Hypertension  AICD (automatic cardioverter/defibrillator) present  Diabetes  Stented coronary artery  H/O vasectomy: 20 yrs ago ()      SOCIAL HISTORY:    Reports 40 pack year tobacco history, quit 1 year ago. Denies EtOH or illicit drug use. Denies current sexual activity or hx of STI. Lives in house w/ wife. Performs ADLs independently, does not use cane/walker or other assistance to ambulate. 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    FAMILY HISTORY:  Family history of cardiac disorder: Paternal  Family history of COPD (chronic obstructive pulmonary disease) (Sibling)    Allergies  No Known Allergies      ANTIMICROBIALS:      ANTIMICROBIALS (past 90 days):  MEDICATIONS  (STANDING):    azithromycin  IVPB   250 mL/Hr IV Intermittent (20 @ 20:58)    cefTRIAXone   IVPB   100 mL/Hr IV Intermittent (20 @ 20:42)    vancomycin  IVPB   300 mL/Hr IV Intermittent (20 @ 14:18)        OTHER MEDS:   MEDICATIONS  (STANDING):  acetaminophen   Tablet .. 650 every 6 hours PRN  ALBUTerol    90 MICROgram(s) HFA Inhaler 2 every 12 hours PRN  aspirin  chewable 81 daily  atorvastatin 80 at bedtime  clopidogrel Tablet 75 daily  dextrose 40% Gel 15 once PRN  dextrose 50% Injectable 12.5 once  dextrose 50% Injectable 25 once  dextrose 50% Injectable 25 once  glucagon  Injectable 1 once PRN  influenza   Vaccine 0.5 once  insulin glargine Injectable (LANTUS) 50 at bedtime  insulin lispro (HumaLOG) corrective regimen sliding scale  three times a day before meals  insulin lispro (HumaLOG) corrective regimen sliding scale  at bedtime  insulin lispro Injectable (HumaLOG) 10 three times a day before meals  loratadine 10 daily  metoprolol tartrate 25 two times a day  midodrine 10 every 8 hours  pantoprazole    Tablet 40 before breakfast  tamsulosin 0.4 at bedtime      REVIEW OF SYSTEMS  [  ] ROS unobtainable because:    [V  ] All other systems negative except as noted below:	    Constitutional:  [V ] fever [V ] chills  [ ] weight loss  [ ] weakness  Skin:  [ ] rash [ ] phlebitis	  Eyes: [ ] icterus [ ] pain  [ ] discharge	  ENMT: [ ] sore throat  [ ] thrush [ ] ulcers [ ] exudates  Respiratory: [ ] dyspnea [ ] hemoptysis [ ] cough [ ] sputum	  Cardiovascular:  [V ] chest pain [ ] palpitations [ ] edema	  Gastrointestinal:  [V ] nausea [ V] vomiting [V ] diarrhea [ ] constipation [ ] pain	  Genitourinary:  [ ] dysuria [ ] frequency [ ] hematuria [ ] discharge [ ] flank pain  [ ] incontinence  Musculoskeletal:  [ ] myalgias [ ] arthralgias [ ] arthritis  [V ] back pain  Neurological:  [ ] headache [ ] seizures  [ ] confusion/altered mental status  Psychiatric:  [ ] anxiety [ ] depression	  Hematology/Lymphatics:  [ ] lymphadenopathy  Endocrine:  [ ] adrenal [ ] thyroid  Allergic/Immunologic:	 [ ] transplant [ ] seasonal    VITALS:  Vital Signs Last 24 Hrs  T(F): 100.7 (20 @ 16:00), Max: 102.9 (20 @ 19:54)    Vital Signs Last 24 Hrs  HR: 113 (20 @ 16:00) (90 - 120)  BP: 114/66 (20 @ 16:00) (63/40 - 142/72)  RR: 18 (20 @ 16:00)  SpO2: 95% (20 @ 16:00) (90% - 100%)  Wt(kg): --    EXAM:  GA: NAD  HEENT: oral cavity no lesion  CV: nl S1/S2, no RMG. No erythema, pain or fluid collection around ICD.  Lungs: decreased breathing sounds b/l  Abd: BS+, distended abd, nontender, no rebounding pain  Ext: edema 2+ to knee  Skin: tatoo+. No rash.  IV: no phlebitis  Peguero in place.    Labs:                        12.8   11.16 )-----------( 116      ( 09 Sep 2020 04:33 )             39.4         132<L>  |  94<L>  |  40<H>  ----------------------------<  416<H>  3.3<L>   |  22  |  2.02<H>    Ca    8.9      09 Sep 2020 14:20  Phos  2.9       Mg     1.8         TPro  7.0  /  Alb  3.9  /  TBili  0.7  /  DBili  x   /  AST  41<H>  /  ALT  41  /  AlkPhos  66        WBC Trend:  WBC Count: 11.16 (20 @ 04:33)  WBC Count: 10.28 (20 @ 20:42)      Auto Neutrophil #: 9.25 K/uL (20 @ 04:33)  Auto Neutrophil #: 8.39 K/uL (20 @ 20:42)  Auto Neutrophil #: 8.76 K/uL (19 @ 08:15)  Auto Neutrophil #: 5.42 K/uL (12-15-19 @ 18:45)      Creatine Trend:  Creatinine, Serum: 2.02 ()  Creatinine, Serum: 2.07 ()  Creatinine, Serum: 1.88 ()      Liver Biochemical Testing Trend:  Alanine Aminotransferase (ALT/SGPT): 41 ()  Alanine Aminotransferase (ALT/SGPT): 36 ()  Aspartate Aminotransferase (AST/SGOT): 41 (20 @ 04:36)  Aspartate Aminotransferase (AST/SGOT): 33 (20 @ 20:42)  Bilirubin Total, Serum: 0.7 ()  Bilirubin Total, Serum: 0.6 ()      Trend LDH      Auto Eosinophil %: 0.0 % (20 @ 04:33)  Auto Eosinophil %: 0.0 % (20 @ 20:42)      Urinalysis Basic - ( 09 Sep 2020 07:23 )    Color: Light Yellow / Appearance: Clear / S.014 / pH: x  Gluc: x / Ketone: Negative  / Bili: Negative / Urobili: Negative   Blood: x / Protein: Trace / Nitrite: Negative   Leuk Esterase: Negative / RBC: 2 /hpf / WBC 0 /HPF   Sq Epi: x / Non Sq Epi: 0 /hpf / Bacteria: Negative        MICROBIOLOGY:      GI PCR Panel, Stool (collected 09 Sep 2020 10:21)  Source: .Stool Feces sarina jose  Final Report:    GI PCR Results: NOT detected    *******Please Note:*******    GI panel PCR evaluates for:    Campylobacter, Plesiomonas shigelloides, Salmonella,    Vibrio, Yersinia enterocolitica, Enteroaggregative    Escherichia coli (EAEC), Enteropathogenic E.coli (EPEC),    Enterotoxigenic E. coli (ETEC) lt/st, Shiga-like    toxin-producing E. coli (STEC) stx1/stx2,    Shigella/ Enteroinvasive E. coli (EIEC), Cryptosporidium,    Cyclospora cayetanensis, Entamoeba histolytica,    Giardia lamblia, Adenovirus F 40/41, Astrovirus,    Norovirus GI/GII, Rotavirus A, Sapovirus    Culture - Blood (collected 09 Sep 2020 00:57)  Source: .Blood Blood-Peripheral  Preliminary Report:    Growth in aerobic and anaerobic bottles: Gram Positive Cocci in Pairs and    Chains    Culture - Blood (collected 09 Sep 2020 00:57)  Source: .Blood Blood-Peripheral  Preliminary Report:    Growth in anaerobic bottle: Gram Positive Cocci in Pairs and Chains    Growth in aerobic bottle: Gram Positive Cocci in Pairs and Chains    "Due to technical problems, Proteus sp. will Not be reported as part of    the BCID panel until further notice"    ***Blood Panel PCR results on this specimen are available    approximately 3 hours after the Gram stain result.***    Gram stain, PCR, and/or culture results may not always    correspond due to difference in methodologies.    ************************************************************    This PCR assay was performed using [employer].    The following targets are tested for: Enterococcus,    vancomycin resistant enterococci, Listeria monocytogenes,    coagulase negative staphylococci, S. aureus,    methicillin resistant S. aureus, Streptococcus agalactiae    (Group B), S. pneumoniae, S. pyogenes (Group A),    Acinetobacter baumannii, Enterobacter cloacae, E. coli,    Klebsiella oxytoca, K. pneumoniae, Proteus sp.,    Serratia marcescens, Haemophilus influenzae,    Neisseria meningitidis, Pseudomonas aeruginosa, Candida    albicans, C. glabrata, C krusei, C parapsilosis,    C. tropicalis and the KPC resistance gene.  Organism: Blood Culture PCR  Organism: Blood Culture PCR    Sensitivities:      -  Streptococcus sp. (Not Grp A, B or S pneumoniae): Detec      Method Type: PCR    Legionella Antigen, Urine: Negative ( @ 09:11)  Rapid RVP Result: NotDetec ( @ 22:38)      OTHER TESTS:  COVID-19 PCR: NotDetec (20 @ 22:38)  Blood Gas Venous - Lactate: 1.8 ( @ 20:42)  A1C with Estimated Average Glucose Result: 11.2 % (20 @ 11:26)      RADIOLOGY:  imaging below personally reviewed    < from: CT Chest No Cont (20 @ 01:27) >  IMPRESSION:  No pneumonia.    Emphysema.    No bowel or obstruction.    Hepatomegaly and diffuse hepatic steatosis.    Splenomegaly.    Atrophic right kidney, with severe hydronephrosis. A 0.7 cm soft tissue density is noted in the interpolar region of the right kidney and is incompletely characterized. Ultrasound may be helpful for further evaluation.    < end of copied text > Patient is a 59y old  Male who presents with a chief complaint of fever, cough, emesis (09 Sep 2020 16:03)    HPI:  59M PMH T2DM, HTN, CAD/MI s/p stents (most recent 2018), HFrEF (10-15%), ICD,  ischemic cardiomyopathy, COPD, HTN, GERD pw fever, n/v, back pain x 1 day. Pt states that he was in his baseline state of health when he began to experience back pain while grocery shopping yesterday. He describes sudden onset left lower back pain, palliated by sitting upright, of sharp quality, without radiation, of 7/10 severity, which completely resolved when presenting to ED. After returning home from shopping he experienced chills but did not measure his temperature. After drinking 1 bottle of water he felt nauseous and had an episode of NBNB emesis, with resolved nausea afterward. He reports one instance of cloudy/dark urine while admitted, but denies dysuria, hematuria, change in frequency or foul odor. In addition he reports cough without sputum production. He denies HA, weakness, confusion, sinus congestion, CP, abd pain, dysuria, hematuria, , or myalgias.     ED Course:  VS: febrile 102.9, HR , BP 63/40 to 105/62, RR 17-25, SpO2%  on RA  Labs: significant for WBC 10.28, thrombocytopenia 146, elevated BUN/SCr 32/1.88, hyperglycemic 247, elevated proBNP 1158, lactate wnl  Micro: COVID-19 negative, RVP negative, UA not concerning for infxn  Imaging: CXR: clr lungs (prelim read)  Orders: received po tylenol 975mg, po motrin 600mg, IV azithromycin 500mg, IV ceftriaxone 1000mg. Midodrine 10mg x 1 received and ordered for 10mg q8h. Started on levophed. Received 500 cc bolus of LR and 500 cc bolus of NS. Urine and Blood Cx sent, CT A/P, chest, non contrast ordered.     Pt admitted to MICU for management of hypotension requiring pressor support. While in ICU experienced emesis and diarrhea (without melena/hematochezia) x 1 with SOB. (09 Sep 2020 02:30)     Pt looks well, only complaint is feeling tired. No more back pain.  He denies any recent dental work. No pain right now.  He c/o ICD site "muscle pain" on and off since 1 month ago. Today there is no pain when pressed on the ICD.  Pt's daughter also has diarrhea over the weekend.  pt with diarrhea yesterday and once today    prior hospital charts reviewed [V]  primary team notes reviewed [V]  other consultant notes reviewed [V]    PAST MEDICAL & SURGICAL HISTORY:  H/O gastroesophageal reflux (GERD)  History of COPD  History of ischemic cardiomyopathy  Heart failure with reduced ejection fraction  Hypertension  AICD (automatic cardioverter/defibrillator) present  Diabetes  Stented coronary artery  H/O vasectomy: 20 yrs ago ()      SOCIAL HISTORY:    Reports 40 pack year tobacco history, quit 1 year ago. Denies EtOH or illicit drug use. Denies current sexual activity or hx of STI. Lives in house w/ wife. Performs ADLs independently, does not use cane/walker or other assistance to ambulate. 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    FAMILY HISTORY:  Family history of cardiac disorder: Paternal  Family history of COPD (chronic obstructive pulmonary disease) (Sibling)    Allergies  No Known Allergies      ANTIMICROBIALS:      ANTIMICROBIALS (past 90 days):  MEDICATIONS  (STANDING):    azithromycin  IVPB   250 mL/Hr IV Intermittent (20 @ 20:58)    cefTRIAXone   IVPB   100 mL/Hr IV Intermittent (20 @ 20:42)    vancomycin  IVPB   300 mL/Hr IV Intermittent (20 @ 14:18)        OTHER MEDS:   MEDICATIONS  (STANDING):  acetaminophen   Tablet .. 650 every 6 hours PRN  ALBUTerol    90 MICROgram(s) HFA Inhaler 2 every 12 hours PRN  aspirin  chewable 81 daily  atorvastatin 80 at bedtime  clopidogrel Tablet 75 daily  dextrose 40% Gel 15 once PRN  dextrose 50% Injectable 12.5 once  dextrose 50% Injectable 25 once  dextrose 50% Injectable 25 once  glucagon  Injectable 1 once PRN  influenza   Vaccine 0.5 once  insulin glargine Injectable (LANTUS) 50 at bedtime  insulin lispro (HumaLOG) corrective regimen sliding scale  three times a day before meals  insulin lispro (HumaLOG) corrective regimen sliding scale  at bedtime  insulin lispro Injectable (HumaLOG) 10 three times a day before meals  loratadine 10 daily  metoprolol tartrate 25 two times a day  midodrine 10 every 8 hours  pantoprazole    Tablet 40 before breakfast  tamsulosin 0.4 at bedtime      REVIEW OF SYSTEMS  [  ] ROS unobtainable because:    [V  ] All other systems negative except as noted below:	    Constitutional:  [V ] fever [V ] chills  [ ] weight loss  [ ] weakness  Skin:  [ ] rash [ ] phlebitis	  Eyes: [ ] icterus [ ] pain  [ ] discharge	  ENMT: [ ] sore throat  [ ] thrush [ ] ulcers [ ] exudates  Respiratory: [ ] dyspnea [ ] hemoptysis [ ] cough [ ] sputum	  Cardiovascular:  [V ] chest pain [ ] palpitations [ ] edema	  Gastrointestinal:  [V ] nausea [ V] vomiting [V ] diarrhea [ ] constipation [ ] pain	  Genitourinary:  [ ] dysuria [ ] frequency [ ] hematuria [ ] discharge [ ] flank pain  [ ] incontinence  Musculoskeletal:  [ ] myalgias [ ] arthralgias [ ] arthritis  [V ] back pain  Neurological:  [ ] headache [ ] seizures  [ ] confusion/altered mental status  Psychiatric:  [ ] anxiety [ ] depression	  Hematology/Lymphatics:  [ ] lymphadenopathy  Endocrine:  [ ] adrenal [ ] thyroid  Allergic/Immunologic:	 [ ] transplant [ ] seasonal    VITALS:  Vital Signs Last 24 Hrs  T(F): 100.7 (20 @ 16:00), Max: 102.9 (20 @ 19:54)    Vital Signs Last 24 Hrs  HR: 113 (20 @ 16:00) (90 - 120)  BP: 114/66 (20 @ 16:00) (63/40 - 142/72)  RR: 18 (20 @ 16:00)  SpO2: 95% (20 @ 16:00) (90% - 100%)  Wt(kg): --    EXAM:  GA: NAD  HEENT: oral cavity no lesion  CV: nl S1/S2, no RMG. No erythema, pain or fluid collection around ICD.  Lungs: decreased breathing sounds b/l  Abd: BS+, distended abd, nontender, no rebounding pain  Ext: edema 2+ to knee  Skin: tatoo+. No rash.  IV: no phlebitis  Peguero in place.    Labs:                        12.8   11.16 )-----------( 116      ( 09 Sep 2020 04:33 )             39.4         132<L>  |  94<L>  |  40<H>  ----------------------------<  416<H>  3.3<L>   |  22  |  2.02<H>    Ca    8.9      09 Sep 2020 14:20  Phos  2.9       Mg     1.8         TPro  7.0  /  Alb  3.9  /  TBili  0.7  /  DBili  x   /  AST  41<H>  /  ALT  41  /  AlkPhos  66        WBC Trend:  WBC Count: 11.16 (20 @ 04:33)  WBC Count: 10.28 (20 @ 20:42)      Auto Neutrophil #: 9.25 K/uL (20 @ 04:33)  Auto Neutrophil #: 8.39 K/uL (20 @ 20:42)  Auto Neutrophil #: 8.76 K/uL (19 @ 08:15)  Auto Neutrophil #: 5.42 K/uL (12-15-19 @ 18:45)      Creatine Trend:  Creatinine, Serum: 2.02 ()  Creatinine, Serum: 2.07 ()  Creatinine, Serum: 1.88 ()      Liver Biochemical Testing Trend:  Alanine Aminotransferase (ALT/SGPT): 41 ()  Alanine Aminotransferase (ALT/SGPT): 36 ()  Aspartate Aminotransferase (AST/SGOT): 41 (20 @ 04:36)  Aspartate Aminotransferase (AST/SGOT): 33 (20 @ 20:42)  Bilirubin Total, Serum: 0.7 ()  Bilirubin Total, Serum: 0.6 ()      Trend LDH      Auto Eosinophil %: 0.0 % (20 @ 04:33)  Auto Eosinophil %: 0.0 % (20 @ 20:42)      Urinalysis Basic - ( 09 Sep 2020 07:23 )    Color: Light Yellow / Appearance: Clear / S.014 / pH: x  Gluc: x / Ketone: Negative  / Bili: Negative / Urobili: Negative   Blood: x / Protein: Trace / Nitrite: Negative   Leuk Esterase: Negative / RBC: 2 /hpf / WBC 0 /HPF   Sq Epi: x / Non Sq Epi: 0 /hpf / Bacteria: Negative        MICROBIOLOGY:      GI PCR Panel, Stool (collected 09 Sep 2020 10:21)  Source: .Stool Feces sarina garcia  Final Report:    GI PCR Results: NOT detected    *******Please Note:*******    GI panel PCR evaluates for:    Campylobacter, Plesiomonas shigelloides, Salmonella,    Vibrio, Yersinia enterocolitica, Enteroaggregative    Escherichia coli (EAEC), Enteropathogenic E.coli (EPEC),    Enterotoxigenic E. coli (ETEC) lt/st, Shiga-like    toxin-producing E. coli (STEC) stx1/stx2,    Shigella/ Enteroinvasive E. coli (EIEC), Cryptosporidium,    Cyclospora cayetanensis, Entamoeba histolytica,    Giardia lamblia, Adenovirus F 40/41, Astrovirus,    Norovirus GI/GII, Rotavirus A, Sapovirus    Culture - Blood (collected 09 Sep 2020 00:57)  Source: .Blood Blood-Peripheral  Preliminary Report:    Growth in aerobic and anaerobic bottles: Gram Positive Cocci in Pairs and    Chains    Culture - Blood (collected 09 Sep 2020 00:57)  Source: .Blood Blood-Peripheral  Preliminary Report:    Growth in anaerobic bottle: Gram Positive Cocci in Pairs and Chains    Growth in aerobic bottle: Gram Positive Cocci in Pairs and Chains    "Due to technical problems, Proteus sp. will Not be reported as part of    the BCID panel until further notice"    ***Blood Panel PCR results on this specimen are available    approximately 3 hours after the Gram stain result.***    Gram stain, PCR, and/or culture results may not always    correspond due to difference in methodologies.    ************************************************************    This PCR assay was performed using [employer].    The following targets are tested for: Enterococcus,    vancomycin resistant enterococci, Listeria monocytogenes,    coagulase negative staphylococci, S. aureus,    methicillin resistant S. aureus, Streptococcus agalactiae    (Group B), S. pneumoniae, S. pyogenes (Group A),    Acinetobacter baumannii, Enterobacter cloacae, E. coli,    Klebsiella oxytoca, K. pneumoniae, Proteus sp.,    Serratia marcescens, Haemophilus influenzae,    Neisseria meningitidis, Pseudomonas aeruginosa, Candida    albicans, C. glabrata, C krusei, C parapsilosis,    C. tropicalis and the KPC resistance gene.  Organism: Blood Culture PCR  Organism: Blood Culture PCR    Sensitivities:      -  Streptococcus sp. (Not Grp A, B or S pneumoniae): Detec      Method Type: PCR    Legionella Antigen, Urine: Negative ( @ 09:11)  Rapid RVP Result: NotDetec ( @ 22:38)      OTHER TESTS:  COVID-19 PCR: NotDetec (20 @ 22:38)  Blood Gas Venous - Lactate: 1.8 ( @ 20:42)  A1C with Estimated Average Glucose Result: 11.2 % (20 @ 11:26)      RADIOLOGY:  imaging below personally reviewed    < from: CT Chest No Cont (20 @ 01:27) >  IMPRESSION:  No pneumonia.    Emphysema.    No bowel or obstruction.    Hepatomegaly and diffuse hepatic steatosis.    Splenomegaly.    Atrophic right kidney, with severe hydronephrosis. A 0.7 cm soft tissue density is noted in the interpolar region of the right kidney and is incompletely characterized. Ultrasound may be helpful for further evaluation.    < end of copied text >      < from: TTE with Doppler (w/Cont) (19 @ 06:40) >  Conclusions:  1. Tethered mitral valve leaflets with normal opening.  Mild-moderate mitral regurgitation.  2. Calcified trileaflet aortic valve with normal opening.  No aortic valve regurgitation seen.  3. Eccentric left ventricular hypertrophy (dilated left  ventricle with normal relative wall thickness).  4. Endocardial visualization enhanced with intravenous  injection of Ultrasonic Enhancing Agent (Definity). Severe  global left ventricular systolic dysfunction. No left  ventricular thrombus.  5. The right ventricle is not well visualized; grossly  normal right ventricular systolic function. A device wire  is noted in the right heart.  6. Trace pericardial effusion.  *** Compared with echocardiogram of 2018, results are  similar on today's study.  ------------------------------------------------------------------------  Confirmed on  2019 - 12:07:02 by Candelaria Gill,    < end of copied text >

## 2020-09-10 PROBLEM — Z86.79 PERSONAL HISTORY OF OTHER DISEASES OF THE CIRCULATORY SYSTEM: Chronic | Status: ACTIVE | Noted: 2020-09-09

## 2020-09-10 PROBLEM — Z87.19 PERSONAL HISTORY OF OTHER DISEASES OF THE DIGESTIVE SYSTEM: Chronic | Status: ACTIVE | Noted: 2020-09-09

## 2020-09-10 PROBLEM — I50.20 UNSPECIFIED SYSTOLIC (CONGESTIVE) HEART FAILURE: Chronic | Status: ACTIVE | Noted: 2020-09-09

## 2020-09-10 PROBLEM — I10 ESSENTIAL (PRIMARY) HYPERTENSION: Chronic | Status: ACTIVE | Noted: 2020-09-09

## 2020-09-10 PROBLEM — Z87.09 PERSONAL HISTORY OF OTHER DISEASES OF THE RESPIRATORY SYSTEM: Chronic | Status: ACTIVE | Noted: 2020-09-09

## 2020-09-10 LAB
ANION GAP SERPL CALC-SCNC: 14 MMOL/L — SIGNIFICANT CHANGE UP (ref 5–17)
ANION GAP SERPL CALC-SCNC: 18 MMOL/L — HIGH (ref 5–17)
APTT BLD: 29.1 SEC — SIGNIFICANT CHANGE UP (ref 27.5–35.5)
BASOPHILS # BLD AUTO: 0.03 K/UL — SIGNIFICANT CHANGE UP (ref 0–0.2)
BASOPHILS NFR BLD AUTO: 0.3 % — SIGNIFICANT CHANGE UP (ref 0–2)
BLD GP AB SCN SERPL QL: NEGATIVE — SIGNIFICANT CHANGE UP
BUN SERPL-MCNC: 37 MG/DL — HIGH (ref 7–23)
BUN SERPL-MCNC: 39 MG/DL — HIGH (ref 7–23)
CALCIUM SERPL-MCNC: 8.5 MG/DL — SIGNIFICANT CHANGE UP (ref 8.4–10.5)
CALCIUM SERPL-MCNC: 8.9 MG/DL — SIGNIFICANT CHANGE UP (ref 8.4–10.5)
CHLORIDE SERPL-SCNC: 94 MMOL/L — LOW (ref 96–108)
CHLORIDE SERPL-SCNC: 96 MMOL/L — SIGNIFICANT CHANGE UP (ref 96–108)
CO2 SERPL-SCNC: 21 MMOL/L — LOW (ref 22–31)
CO2 SERPL-SCNC: 25 MMOL/L — SIGNIFICANT CHANGE UP (ref 22–31)
CREAT SERPL-MCNC: 1.54 MG/DL — HIGH (ref 0.5–1.3)
CREAT SERPL-MCNC: 1.8 MG/DL — HIGH (ref 0.5–1.3)
EOSINOPHIL # BLD AUTO: 0.02 K/UL — SIGNIFICANT CHANGE UP (ref 0–0.5)
EOSINOPHIL NFR BLD AUTO: 0.2 % — SIGNIFICANT CHANGE UP (ref 0–6)
GLUCOSE BLDC GLUCOMTR-MCNC: 169 MG/DL — HIGH (ref 70–99)
GLUCOSE BLDC GLUCOMTR-MCNC: 171 MG/DL — HIGH (ref 70–99)
GLUCOSE BLDC GLUCOMTR-MCNC: 273 MG/DL — HIGH (ref 70–99)
GLUCOSE BLDC GLUCOMTR-MCNC: 326 MG/DL — HIGH (ref 70–99)
GLUCOSE BLDC GLUCOMTR-MCNC: 331 MG/DL — HIGH (ref 70–99)
GLUCOSE SERPL-MCNC: 312 MG/DL — HIGH (ref 70–99)
GLUCOSE SERPL-MCNC: 331 MG/DL — HIGH (ref 70–99)
HCT VFR BLD CALC: 37 % — LOW (ref 39–50)
HGB BLD-MCNC: 12.2 G/DL — LOW (ref 13–17)
IMM GRANULOCYTES NFR BLD AUTO: 0.6 % — SIGNIFICANT CHANGE UP (ref 0–1.5)
INR BLD: 1.12 RATIO — SIGNIFICANT CHANGE UP (ref 0.88–1.16)
LYMPHOCYTES # BLD AUTO: 1.1 K/UL — SIGNIFICANT CHANGE UP (ref 1–3.3)
LYMPHOCYTES # BLD AUTO: 12.8 % — LOW (ref 13–44)
MAGNESIUM SERPL-MCNC: 2 MG/DL — SIGNIFICANT CHANGE UP (ref 1.6–2.6)
MAGNESIUM SERPL-MCNC: 2.2 MG/DL — SIGNIFICANT CHANGE UP (ref 1.6–2.6)
MCHC RBC-ENTMCNC: 26.2 PG — LOW (ref 27–34)
MCHC RBC-ENTMCNC: 33 GM/DL — SIGNIFICANT CHANGE UP (ref 32–36)
MCV RBC AUTO: 79.6 FL — LOW (ref 80–100)
MONOCYTES # BLD AUTO: 0.82 K/UL — SIGNIFICANT CHANGE UP (ref 0–0.9)
MONOCYTES NFR BLD AUTO: 9.5 % — SIGNIFICANT CHANGE UP (ref 2–14)
NEUTROPHILS # BLD AUTO: 6.57 K/UL — SIGNIFICANT CHANGE UP (ref 1.8–7.4)
NEUTROPHILS NFR BLD AUTO: 76.6 % — SIGNIFICANT CHANGE UP (ref 43–77)
NRBC # BLD: 0 /100 WBCS — SIGNIFICANT CHANGE UP (ref 0–0)
PHOSPHATE SERPL-MCNC: 2.5 MG/DL — SIGNIFICANT CHANGE UP (ref 2.5–4.5)
PHOSPHATE SERPL-MCNC: 2.9 MG/DL — SIGNIFICANT CHANGE UP (ref 2.5–4.5)
PLATELET # BLD AUTO: 113 K/UL — LOW (ref 150–400)
POTASSIUM SERPL-MCNC: 3.2 MMOL/L — LOW (ref 3.5–5.3)
POTASSIUM SERPL-MCNC: 3.3 MMOL/L — LOW (ref 3.5–5.3)
POTASSIUM SERPL-SCNC: 3.2 MMOL/L — LOW (ref 3.5–5.3)
POTASSIUM SERPL-SCNC: 3.3 MMOL/L — LOW (ref 3.5–5.3)
PROTHROM AB SERPL-ACNC: 13.3 SEC — SIGNIFICANT CHANGE UP (ref 10.6–13.6)
RBC # BLD: 4.65 M/UL — SIGNIFICANT CHANGE UP (ref 4.2–5.8)
RBC # FLD: 16.2 % — HIGH (ref 10.3–14.5)
RH IG SCN BLD-IMP: POSITIVE — SIGNIFICANT CHANGE UP
SARS-COV-2 IGG SERPL QL IA: NEGATIVE — SIGNIFICANT CHANGE UP
SARS-COV-2 IGM SERPL IA-ACNC: <0.1 INDEX — SIGNIFICANT CHANGE UP
SODIUM SERPL-SCNC: 133 MMOL/L — LOW (ref 135–145)
SODIUM SERPL-SCNC: 135 MMOL/L — SIGNIFICANT CHANGE UP (ref 135–145)
VANCOMYCIN FLD-MCNC: 6.1 UG/ML — SIGNIFICANT CHANGE UP
WBC # BLD: 8.59 K/UL — SIGNIFICANT CHANGE UP (ref 3.8–10.5)
WBC # FLD AUTO: 8.59 K/UL — SIGNIFICANT CHANGE UP (ref 3.8–10.5)

## 2020-09-10 PROCEDURE — 99232 SBSQ HOSP IP/OBS MODERATE 35: CPT

## 2020-09-10 PROCEDURE — 99291 CRITICAL CARE FIRST HOUR: CPT | Mod: 25

## 2020-09-10 PROCEDURE — 33244 REMOVE ELCTRD TRANSVENOUSLY: CPT

## 2020-09-10 PROCEDURE — 71045 X-RAY EXAM CHEST 1 VIEW: CPT | Mod: 26

## 2020-09-10 PROCEDURE — ZZZZZ: CPT

## 2020-09-10 PROCEDURE — 33241 REMOVE PULSE GENERATOR: CPT

## 2020-09-10 RX ORDER — CEFTRIAXONE 500 MG/1
2000 INJECTION, POWDER, FOR SOLUTION INTRAMUSCULAR; INTRAVENOUS EVERY 24 HOURS
Refills: 0 | Status: COMPLETED | OUTPATIENT
Start: 2020-09-10 | End: 2020-09-15

## 2020-09-10 RX ORDER — POTASSIUM CHLORIDE 20 MEQ
10 PACKET (EA) ORAL
Refills: 0 | Status: COMPLETED | OUTPATIENT
Start: 2020-09-10 | End: 2020-09-10

## 2020-09-10 RX ORDER — POTASSIUM CHLORIDE 20 MEQ
40 PACKET (EA) ORAL EVERY 4 HOURS
Refills: 0 | Status: COMPLETED | OUTPATIENT
Start: 2020-09-10 | End: 2020-09-10

## 2020-09-10 RX ORDER — INSULIN HUMAN 100 [IU]/ML
6 INJECTION, SOLUTION SUBCUTANEOUS
Qty: 100 | Refills: 0 | Status: DISCONTINUED | OUTPATIENT
Start: 2020-09-10 | End: 2020-09-11

## 2020-09-10 RX ADMIN — Medication 100 MILLIEQUIVALENT(S): at 03:52

## 2020-09-10 RX ADMIN — Medication 25 MILLIGRAM(S): at 21:30

## 2020-09-10 RX ADMIN — ATORVASTATIN CALCIUM 80 MILLIGRAM(S): 80 TABLET, FILM COATED ORAL at 21:29

## 2020-09-10 RX ADMIN — Medication 8: at 14:07

## 2020-09-10 RX ADMIN — Medication 8: at 06:42

## 2020-09-10 RX ADMIN — Medication 6: at 17:27

## 2020-09-10 RX ADMIN — CLOPIDOGREL BISULFATE 75 MILLIGRAM(S): 75 TABLET, FILM COATED ORAL at 15:02

## 2020-09-10 RX ADMIN — TAMSULOSIN HYDROCHLORIDE 0.4 MILLIGRAM(S): 0.4 CAPSULE ORAL at 21:30

## 2020-09-10 RX ADMIN — Medication 25 MILLIGRAM(S): at 15:01

## 2020-09-10 RX ADMIN — CEFTRIAXONE 100 MILLIGRAM(S): 500 INJECTION, POWDER, FOR SOLUTION INTRAMUSCULAR; INTRAVENOUS at 18:56

## 2020-09-10 RX ADMIN — Medication 81 MILLIGRAM(S): at 15:02

## 2020-09-10 RX ADMIN — LORATADINE 10 MILLIGRAM(S): 10 TABLET ORAL at 15:02

## 2020-09-10 RX ADMIN — Medication 40 MILLIEQUIVALENT(S): at 18:55

## 2020-09-10 RX ADMIN — Medication 40 MILLIEQUIVALENT(S): at 16:35

## 2020-09-10 RX ADMIN — Medication 100 MILLIEQUIVALENT(S): at 07:10

## 2020-09-10 RX ADMIN — INSULIN GLARGINE 50 UNIT(S): 100 INJECTION, SOLUTION SUBCUTANEOUS at 21:28

## 2020-09-10 RX ADMIN — Medication 100 MILLIEQUIVALENT(S): at 05:01

## 2020-09-10 NOTE — PROGRESS NOTE ADULT - SUBJECTIVE AND OBJECTIVE BOX
INTERVAL HPI/OVERNIGHT EVENTS:   s/p AICD extraction, patient tolerated procedure very well.       T(C): 37.8 (09-10-20 @ 20:00), Max: 37.8 (09-10-20 @ 20:00)  HR: 112 (09-10-20 @ 22:00) (109 - 123)  BP: 119/73 (09-10-20 @ 12:10) (119/73 - 119/73)  RR: 29 (09-10-20 @ 22:00) (17 - 29)  SpO2: 92% (09-10-20 @ 22:00) (86% - 100%)      MEDICATIONS  (STANDING):  aspirin  chewable 81 milliGRAM(s) Oral daily  atorvastatin 80 milliGRAM(s) Oral at bedtime  cefTRIAXone   IVPB 2000 milliGRAM(s) IV Intermittent every 24 hours  chlorhexidine 4% Liquid 1 Application(s) Topical <User Schedule>  clopidogrel Tablet 75 milliGRAM(s) Oral daily  dextrose 5%. 1000 milliLiter(s) (50 mL/Hr) IV Continuous <Continuous>  dextrose 50% Injectable 12.5 Gram(s) IV Push once  dextrose 50% Injectable 25 Gram(s) IV Push once  dextrose 50% Injectable 25 Gram(s) IV Push once  influenza   Vaccine 0.5 milliLiter(s) IntraMuscular once  insulin glargine Injectable (LANTUS) 50 Unit(s) SubCutaneous at bedtime  insulin lispro (HumaLOG) corrective regimen sliding scale   SubCutaneous three times a day before meals  insulin lispro (HumaLOG) corrective regimen sliding scale   SubCutaneous at bedtime  insulin lispro Injectable (HumaLOG) 10 Unit(s) SubCutaneous three times a day before meals  insulin regular Infusion 6 Unit(s)/Hr (6 mL/Hr) IV Continuous <Continuous>  loratadine 10 milliGRAM(s) Oral daily  metoprolol tartrate 25 milliGRAM(s) Oral two times a day  midodrine 10 milliGRAM(s) Oral every 8 hours  ondansetron Injectable 4 milliGRAM(s) IV Push once  pantoprazole    Tablet 40 milliGRAM(s) Oral before breakfast  tamsulosin 0.4 milliGRAM(s) Oral at bedtime    MEDICATIONS  (PRN):  acetaminophen   Tablet .. 650 milliGRAM(s) Oral every 6 hours PRN Temp greater or equal to 38C (100.4F), Mild Pain (1 - 3)  ALBUTerol    90 MICROgram(s) HFA Inhaler 2 Puff(s) Inhalation every 12 hours PRN Shortness of Breath and/or Wheezing  dextrose 40% Gel 15 Gram(s) Oral once PRN Blood Glucose LESS THAN 70 milliGRAM(s)/deciliter  glucagon  Injectable 1 milliGRAM(s) IntraMuscular once PRN Glucose LESS THAN 70 milligrams/deciliter    IV PiggyBack: 750 mL    Oral Fluid: 500 mL  Total IN: 1250 mL    OUT:    Indwelling Catheter - Urethral: 2305 mL    Voided: 50 mL  Total OUT: 2355 mL    Total NET: -1105 mL        PHYSICAL EXAM:    Constitutional: NAD. well-developed; well-groomed; well-nourished;  HEENT: AT/NC, PERRLA; EOMI, MMM, no oropharyngeal lesions, no erythema, no exudates,   Respiratory: CTAB. equal aeration bilaterally. no wheezing, no crackes, no rhonchi.   Cardiovascular: RRR, no M/R/G. no JVD  Gastrointestinal: soft; NT/ND, obese, +BS, no rebounding tenderness / guarding / HSM / mass / ascites.  Extremities: no clubbing; no cyanosis; 1+ LE edema to shins, non-tender to palpation, DP and Radial pulses intact.  Skin: warm and dry; color normal: no rash: no ulcers  Neurological: A&Ox 3; responds to pain; responds to verbal commands; CN nerves grossly intact.                           12.2   8.59  )-----------( 113      ( 10 Sep 2020 00:24 )             37.0           LIVER FUNCTIONS - ( 09 Sep 2020 04:36 )  Alb: 3.9 g/dL / Pro: 7.0 g/dL / ALK PHOS: 66 U/L / ALT: 41 U/L / AST: 41 U/L / GGT: x           PT/INR - ( 10 Sep 2020 00:24 )   PT: 13.3 sec;   INR: 1.12 ratio         PTT - ( 10 Sep 2020 00:24 )  PTT:29.1 sec  135|96|37<312  3.2|25|1.54  8.5,2.2,2.9  09-10 @ 14:38  133|94|39<331  3.3|21|1.80  8.9,--,--  09-10 @ 01:20  --|--|--<--  --|--|--  --,2.0,2.5  09-10 @ 00:24  EKG:   MICROBIOLOGY:    GI PCR Panel, Stool (collected 09 Sep 2020 10:21)  Source: .Stool Feces sarina jose  Final Report (09 Sep 2020 12:58):    GI PCR Results: NOT detected    *******Please Note:*******    GI panel PCR evaluates for:    Campylobacter, Plesiomonas shigelloides, Salmonella,    Vibrio, Yersinia enterocolitica, Enteroaggregative    Escherichia coli (EAEC), Enteropathogenic E.coli (EPEC),    Enterotoxigenic E. coli (ETEC) lt/st, Shiga-like    toxin-producing E. coli (STEC) stx1/stx2,    Shigella/ Enteroinvasive E. coli (EIEC), Cryptosporidium,    Cyclospora cayetanensis, Entamoeba histolytica,    Giardia lamblia, Adenovirus F 40/41, Astrovirus,    Norovirus GI/GII, Rotavirus A, Sapovirus    Culture - Stool (09.09.20 @ 10:21)    Specimen Source: .Stool Feces  sarina jose    Culture Results:   No enteric pathogens to date: Final culture pending    Culture - Blood (09.09.20 @ 00:57)    Gram Stain:   Growth in aerobic and anaerobic bottles: Gram Positive Cocci in Pairs and  Chains    Specimen Source: .Blood Blood-Peripheral    Culture Results:   Growth in aerobic and anaerobic bottles: Streptococcus gallolyticus  See previous culture [id number]        IMAGING:      < from: CT Chest No Cont (09.09.20 @ 01:27) >  IMPRESSION:  No pneumonia.    Emphysema.    No bowel or obstruction.    Hepatomegaly and diffuse hepatic steatosis.    Splenomegaly.    Atrophic right kidney, with severe hydronephrosis. A 0.7 cm soft tissue density is noted in the interpolar region of the right kidney and is incompletely characterized. Ultrasound may be helpful for further evaluation.    < end of copied text >

## 2020-09-10 NOTE — PROGRESS NOTE ADULT - ASSESSMENT
Assessment: 59M PMH T2DM, HTN, CAD/MI s/p stents (most recent 2/2018), ICM w/ HFrEF (10-15%) s/p AICD, COPD (not on home O2), HTN, GERD, BPH, who is admitted for sepsis 2/2 gram positive bacteremia. Admitted to MICU for hypotension yet did not require pressor.     # Hypotension secondary to Septic Shock  - c/w Midodrine 10 q 8 to ween pressor support  - resuming home metoprolol but holding entresto, furosemide, indapamide.   - Diuresing as needed    # HFrEF s/p AICD  - most recent echo from 12/2019: EF 10-15% sev global LV systolic dysfunction. eccentric LVH  - cardiology following   - resumed b-blocker but holding onto rest of HF meds.  - s/p AICD extraction in setting of Group D Strep Bacteremia    # CAD s/p stent  - c/w home DAPT (ASA/PLV) and high dose statin.      GI/NUTRITION  - GI PCR negative. Stool culture pending  - Continuing home pantoprazole po 40mg  - diabetic diet.  - Consider GI consult for colonoscopy given Strep bovis bacteremia.     Renal  - MANJIT improving - BUN/SCr 39/1.80 (1.4-1.8 during 12/2019 admission)  - Peguero in place, adequate urine output 2300/24hrs.   - Holding home diuretics and will diurese as needed as above  - Atrophied R kidney w/ chronic R hydronephrosis which was also partially imaged on CT Chest from 11/11/2019  - nephrology and urology consult - no need for urgent nephrostomy tube, can place later when patient is stable or if Cr continues to worsen.     #BPH  - continuing home tamsulosin 0.4mg  - Peguero as above    Hematologic  - Monitor CBC, currently stable    #DVT ppx  - SCDs for DVT ppx      # Strep Gallolyticus bacteremia  - S/p 1 dose vancomycin 1.5g. Trough 6.1.   - Appreciate ID recs for abx with culture update and vanc level.   - repeat BCx daily.  - Stool Cx/GI PCR pending, no growth yet.     Hyperglycemia   - Pt w/ hx of IDDM on home Lantus 74u qhs and Humalog 50u pre-meal per pt  - Pt FS 300s with 38u ISS in addition to 50u Lantus, 10u premeal.   - Appreciate endo recs on increasing Basal bolus - 75u Lantus, 20u premeal.   - Will monitor FSS with moderate ISS    Ethics  - GOC discussed Patient wants full code.

## 2020-09-10 NOTE — PROGRESS NOTE ADULT - ASSESSMENT
Assessment: 59M PMH T2DM, HTN, CAD/MI s/p stents (most recent 2/2018), ICM w/ HFrEF (10-15%) s/p AICD, COPD (not on home O2), HTN, GERD, BPH, who is admitted for sepsis 2/2 gram positive bacteremia. Admitted to MICU for hypotension yet did not require pressor.     Neuro  - AOx3, no active issues    Respiratory  - Small R pleural collection on CT, CXR with clr lungs (preliminary reads)  - c/w home albuterol prn  - Pt satting well on RA    Cardiovascular  # Hypotension secondary to Septic Shock  - currently not requiring IV pressor support.   - POCUS positive for B-lines, however no overt signs of pulmonary edema on CT Scan  - c/w Midodrine 10 q 8 to ween pressor support  - resuming home metoprolol but holding entresto, furosemide, indapamide.   - Diuresing as needed    # HFrEF s/p AICD  - most recent echo from 12/2019: EF 10-15% sev global LV systolic dysfunction. eccentric LVH  - cardiology consulted. appreciate recs.  - resumed b-blocker but holding onto rest of HF meds.  - AICD extraction this AM in setting of Group D Strep Bacteremia    # CAD s/p stent  - c/w home DAPT (ASA/PLV) and high dose statin.      GI/NUTRITION  - GI PCR negative. Stool culture pending  - Continuing home pantoprazole po 40mg  - diabetic diet.  - Consider GI consult for colonoscopy given Strep bovis bacteremia.     Renal  - MANJIT improving - BUN/SCr 39/1.80 (1.4-1.8 during 12/2019 admission)  - Peguero in place, adequate urine output 2300/24hrs.   - Holding home diuretics and will diurese as needed as above  - Atrophied R kidney w/ chronic R hydronephrosis which was also partially imaged on CT Chest from 11/11/2019  - nephrology and urology consult - no need for urgent nephrostomy tube, can place later when patient is stable or if Cr continues to worsen.     #BPH  - continuing home tamsulosin 0.4mg  - Peguero as above    Hematologic  - Monitor CBC, currently stable    #DVT ppx  - SCDs for DVT ppx    ID  # Strep Gallolyticus bacteremia  - S/p 1 dose vancomycin 1.5g. Trough 6.1.   - Appreciate ID recs for abx with culture update and vanc level.   - repeat BCx daily.  - Stool Cx/GI PCR pending, no growth yet.     ENDOCRINE:   - Pt w/ hx of IDDM on home Lantus 74u qhs and Humalog 50u pre-meal per pt  - Pt FS 300s with 38u ISS in addition to 50u Lantus, 10u premeal.   - Appreciate endo recs on increasing Basal bolus - 75u Lantus, 20u premeal.   - Will monitor FSS with moderate ISS    Ethics  - GOC discussed Patient wants full code.

## 2020-09-10 NOTE — PROGRESS NOTE ADULT - ASSESSMENT
59M PMH T2DM, HTN, CAD/MI s/p stents (most recent 2/2018), HFrEF (10-15%), ICD,  ischemic cardiomyopathy, COPD, HTN, GERD pw fever, n/v, back pain IN ED was found to have T 102.9,, BP 63/40  elevated BUN/SCr 32/1.88, hyperglycemic 247, elevated proBNP 1158, lactate Started on levophed. hypotension requiring pressor support.  now with manjit as well    1- MANJIT on ckd III likely   2- right hydro and atrophy kidney   3- fevers  4- hypotension   5- bacteremia       manjit in setting of infection likely   renal sono to evaluate right kidney lesion   rocephin 1 g iv qd  midodrine 10 mg tid

## 2020-09-10 NOTE — PROGRESS NOTE ADULT - ASSESSMENT
Assessment  DMT2: 59y Male with DM T2 with hyperglycemia, A1C 11.2%, was on high-dose basal bolus insulin at home, now on basal bolus insulin, increased dose yesterday, blood sugars still running high and not at target, no hypoglycemic episodes, patient is postop.  Septic Shock: Hydronephrosis, On IV ABx, stable, monitored.  Obesity: No strict exercise routines, not on any weight loss program, neither on low calorie diet.          Cortney Flanagan MD  Cell: [phone]  Office: [phone]

## 2020-09-10 NOTE — PROGRESS NOTE ADULT - ASSESSMENT
ASSESSMENT/RECOMMENDATIONS:  59M PMH T2DM(a1c=11.2%), HTN, CAD/MI s/p stents (most recent 2/2018), HFrEF (10-15%), ICD, ischemic cardiomyopathy, COPD, HTN, GERD pw fever, n/v, back pain x 1 day. Pt states that he was in his baseline state of health when he began to experience back pain while grocery shopping yesterday. He describes sudden onset left lower back pain, palliated by sitting upright, of sharp quality, without radiation, of 7/10 severity, which completely resolved when presenting to ED.     VS: febrile 102.9, HR , BP 63/40 to 105/62, RR 17-25, SpO2%  on RA  Labs: significant for WBC 10.28, thrombocytopenia 146, elevated BUN/SCr 32/1.88, hyperglycemic 247, elevated proBNP 1158, lactate wnl  Micro: COVID-19 negative, RVP negative, UA 0 WBC and negative for bacteria. GI PCR negative. BCx: Strep by PCR.  CT: No pneumonia. Emphysema. No bowel or obstruction. Hepatomegaly and diffuse hepatic steatosis. Splenomegaly. Atrophic right kidney, with severe hydronephrosis. A 0.7 cm soft tissue density is noted in the interpolar region of the right kidney and is incompletely characterized. Ultrasound may be helpful for further evaluation.  Pt admitted to MICU for management of hypotension requiring pressor support. While in ICU experienced emesis and diarrhea (without melena/hematochezia) x 1 with SOB.     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  Pt looks well, only complaint is feeling tired. No more  pain.  He denies any recent dental work. No pain right now.  He c/o ICD site "muscle pain" on and off since 1 month ago. Today there is no pain when pressed on the ICD.  Pt's daughter also has diarrhea over the weekend.  pt with Streptococcus gallolyticus ( strep bovis)    Await final sensitivity of the streptococcus  continue Rocephin  GI evaluation- r/o malignancy   will discuss with EPS about the timing of a new AICD  Await follow up blood culutres

## 2020-09-10 NOTE — CHART NOTE - NSCHARTNOTEFT_GEN_A_CORE
MICU Transfer Note    Transfer from: MICU    Transfer to: X Telemetry     Accepting Physician:  Signout given to:     MICU COURSE:    : 59M PMH T2DM, HTN, CAD/MI s/p stents (most recent 2/2018), HFrEF (10-15%), ICD,  ischemic cardiomyopathy, COPD, HTN, GERD, BPH pw fever, n/v, diarrhea, back pain x 1 day, septic upon admission, admitted to MICU for management of hypotension requiring pressor support, found to have R hydronephrosis on CT potentially source of infxn.    In the ICU, patient was weaned on pressor support since 9/9, 9/10 AICD removed with plan to continue to place patient with defibrillator pads on the floor. ABX changed from vancomycin to ceftriaxone. Patient had developed MANJIT in setting of initial hypotention, but now renal function continuing to improve. Endo following for elevated blood sugars, which are now more optimally controlled.       ASSESSMENT & PLAN:   59M PMH T2DM, HTN, CAD/MI s/p stents (most recent 2/2018), ICM w/ HFrEF (10-15%) s/p AICD, COPD (not on home O2), HTN, GERD, BPH, who is admitted for sepsis 2/2 gram positive bacteremia. Admitted to MICU for hypotension yet did not require pressor.     Neuro  - AOx3, no active issues    Respiratory  - Small R pleural collection on CT, CXR with clr lungs (preliminary reads)  - c/w home albuterol prn  - Pt satting well on RA    Cardiovascular  # Hypotension secondary to Septic Shock  - currently not requiring IV pressor support.   - POCUS positive for B-lines, however no overt signs of pulmonary edema on CT Scan  - c/w Midodrine 10 q 8 to ween pressor support  - resuming home metoprolol but holding entresto, furosemide, indapamide.   - Diuresing as needed    # HFrEF s/p AICD  - most recent echo from 12/2019: EF 10-15% sev global LV systolic dysfunction. eccentric LVH  - cardiology consulted. appreciate recs.  - resumed b-blocker but holding onto rest of HF meds.  - AICD extraction this AM in setting of Group D Strep Bacteremia    # CAD s/p stent  - c/w home DAPT (ASA/PLV) and high dose statin.      GI/NUTRITION  - GI PCR negative. Stool culture pending  - Continuing home pantoprazole po 40mg  - diabetic diet.  - Consider GI consult for colonoscopy given Strep bovis bacteremia.     Renal  - MANJIT improving - BUN/SCr 39/1.80 (1.4-1.8 during 12/2019 admission)  - Peguero in place, adequate urine output 2300/24hrs.   - Holding home diuretics and will diurese as needed as above  - Atrophied R kidney w/ chronic R hydronephrosis which was also partially imaged on CT Chest from 11/11/2019  - nephrology and urology consult - no need for urgent nephrostomy tube, can place later when patient is stable or if Cr continues to worsen.     #BPH  - continuing home tamsulosin 0.4mg  - Peguero as above    Hematologic  - Monitor CBC, currently stable    #DVT ppx  - SCDs for DVT ppx    ID  # Strep Gallolyticus bacteremia  - S/p 1 dose vancomycin 1.5g. Trough 6.1.   - Appreciate ID recs for abx with culture update and vanc level.   - repeat BCx daily.  - Stool Cx/GI PCR pending, no growth yet.     ENDOCRINE:   - Pt w/ hx of IDDM on home Lantus 74u qhs and Humalog 50u pre-meal per pt  - Pt FS 300s with 38u ISS in addition to 50u Lantus, 10u premeal.   - Appreciate endo recs on increasing Basal bolus - 75u Lantus, 20u premeal.   - Will monitor FSS with moderate ISS    Ethics  - GOC discussed Patient wants full code.    FOR FOLLOW UP:    [ ] f/u GI recc, email consult sent out because of findings of Strep Gallolyticus bacteremia   [ ] monitor on telemetry, s/p AICD removal   [ ] Await final sensitivity of the streptococcus, f/u ID reccs   [ ] f/u renal reccs MICU Transfer Note    Transfer from: MICU    Transfer to: X Telemetry     Accepting Physician:  Signout given to:     MICU COURSE:    : 59M PMH T2DM, HTN, CAD/MI s/p stents (most recent 2/2018), HFrEF (10-15%), ICD,  ischemic cardiomyopathy, COPD, HTN, GERD, BPH pw fever, n/v, diarrhea, back pain x 1 day, septic upon admission, admitted to MICU for management of hypotension requiring pressor support, found to have R hydronephrosis on CT potentially source of infxn.    In the ICU, patient was weaned on pressor support since 9/9, 9/10 AICD removed with plan to continue to place patient with defibrillator pads on the floor. ABX changed from vancomycin to ceftriaxone. Patient had developed MANJIT in setting of initial hypotention, but now renal function continuing to improve. Endo following for elevated blood sugars, which are now more optimally controlled.       ASSESSMENT & PLAN:   59M PMH T2DM, HTN, CAD/MI s/p stents (most recent 2/2018), ICM w/ HFrEF (10-15%) s/p AICD, COPD (not on home O2), HTN, GERD, BPH, who is admitted for sepsis 2/2 gram positive bacteremia. Admitted to MICU for hypotension yet did not require pressor.     Neuro  - AOx3, no active issues    Respiratory  - Small R pleural collection on CT, CXR with clr lungs (preliminary reads)  - c/w home albuterol prn  - Pt satting well on RA    Cardiovascular  # Hypotension secondary to Septic Shock  - currently not requiring IV pressor support.   - POCUS positive for B-lines, however no overt signs of pulmonary edema on CT Scan  - c/w Midodrine 10 q 8 to ween pressor support  - resuming home metoprolol but holding entresto, furosemide, indapamide.   - Diuresing as needed    # HFrEF s/p AICD  - most recent echo from 12/2019: EF 10-15% sev global LV systolic dysfunction. eccentric LVH  - cardiology consulted. appreciate recs.  - resumed b-blocker but holding onto rest of HF meds.  - AICD extraction this AM in setting of Group D Strep Bacteremia    # CAD s/p stent  - c/w home DAPT (ASA/PLV) and high dose statin.      GI/NUTRITION  - GI PCR negative. Stool culture pending  - Continuing home pantoprazole po 40mg  - diabetic diet.  - Consider GI consult for colonoscopy given Strep bovis bacteremia.     Renal  - MANJIT improving - BUN/SCr 39/1.80 (1.4-1.8 during 12/2019 admission)  - Peguero in place, adequate urine output 2300/24hrs.   - Holding home diuretics and will diurese as needed as above  - Atrophied R kidney w/ chronic R hydronephrosis which was also partially imaged on CT Chest from 11/11/2019  - nephrology and urology consult - no need for urgent nephrostomy tube, can place later when patient is stable or if Cr continues to worsen.     #BPH  - continuing home tamsulosin 0.4mg  - Peguero as above    Hematologic  - Monitor CBC, currently stable    #DVT ppx  - SCDs for DVT ppx    ID  # Strep Gallolyticus bacteremia  - S/p 1 dose vancomycin 1.5g. Trough 6.1.   - Appreciate ID recs for abx with culture update and vanc level.   - repeat BCx daily.  - Stool Cx/GI PCR pending, no growth yet.     ENDOCRINE:   - Pt w/ hx of IDDM on home Lantus 74u qhs and Humalog 50u pre-meal per pt  - Pt FS 300s with 38u ISS in addition to 50u Lantus, 10u premeal.   - Appreciate endo recs on increasing Basal bolus - 75u Lantus, 20u premeal.   - Will monitor FSS with moderate ISS    Ethics  - GOC discussed Patient wants full code.    FOR FOLLOW UP:    [ ] f/u GI recc, email consult sent out because of findings of Strep Gallolyticus bacteremia   [ ] monitor on telemetry, s/p AICD removal   [ ] Await final sensitivity of the streptococcus, f/u ID reccs   [ ] monitor renal function, is now currently improving. MICU Transfer Note    Transfer from: MICU    Transfer to: X Telemetry     Accepting Physician: Dr Roselyn Sow   Signout given to:     MICU COURSE:    : 59M PMH T2DM, HTN, CAD/MI s/p stents (most recent 2/2018), HFrEF (10-15%), ICD,  ischemic cardiomyopathy, COPD, HTN, GERD, BPH pw fever, n/v, diarrhea, back pain x 1 day, septic upon admission, admitted to MICU for management of hypotension requiring pressor support, found to have R hydronephrosis on CT potentially source of infxn.    In the ICU, patient was weaned on pressor support since 9/9, 9/10 AICD removed with plan to continue to place patient with defibrillator pads on the floor. ABX changed from vancomycin to ceftriaxone. Patient had developed MANJIT in setting of initial hypotention, but now renal function continuing to improve. Endo following for elevated blood sugars, which are now more optimally controlled.       ASSESSMENT & PLAN:   59M PMH T2DM, HTN, CAD/MI s/p stents (most recent 2/2018), ICM w/ HFrEF (10-15%) s/p AICD, COPD (not on home O2), HTN, GERD, BPH, who is admitted for sepsis 2/2 gram positive bacteremia. Admitted to MICU for hypotension yet did not require pressor.     Neuro  - AOx3, no active issues    Respiratory  - Small R pleural collection on CT, CXR with clr lungs (preliminary reads)  - c/w home albuterol prn  - Pt satting well on RA    Cardiovascular  # Hypotension secondary to Septic Shock  - currently not requiring IV pressor support.   - POCUS positive for B-lines, however no overt signs of pulmonary edema on CT Scan  - c/w Midodrine 10 q 8 to ween pressor support  - resuming home metoprolol but holding entresto, furosemide, indapamide.   - Diuresing as needed    # HFrEF s/p AICD  - most recent echo from 12/2019: EF 10-15% sev global LV systolic dysfunction. eccentric LVH  - cardiology consulted. appreciate recs.  - resumed b-blocker but holding onto rest of HF meds.  - AICD extraction this AM in setting of Group D Strep Bacteremia    # CAD s/p stent  - c/w home DAPT (ASA/PLV) and high dose statin.      GI/NUTRITION  - GI PCR negative. Stool culture pending  - Continuing home pantoprazole po 40mg  - diabetic diet.  - Consider GI consult for colonoscopy given Strep bovis bacteremia.     Renal  - MANJIT improving - BUN/SCr 39/1.80 (1.4-1.8 during 12/2019 admission)  - Peguero in place, adequate urine output 2300/24hrs.   - Holding home diuretics and will diurese as needed as above  - Atrophied R kidney w/ chronic R hydronephrosis which was also partially imaged on CT Chest from 11/11/2019  - nephrology and urology consult - no need for urgent nephrostomy tube, can place later when patient is stable or if Cr continues to worsen.     #BPH  - continuing home tamsulosin 0.4mg  - Peguero as above    Hematologic  - Monitor CBC, currently stable    #DVT ppx  - SCDs for DVT ppx    ID  # Strep Gallolyticus bacteremia  - S/p 1 dose vancomycin 1.5g. Trough 6.1.   - Appreciate ID recs for abx with culture update and vanc level.   - repeat BCx daily.  - Stool Cx/GI PCR pending, no growth yet.     ENDOCRINE:   - Pt w/ hx of IDDM on home Lantus 74u qhs and Humalog 50u pre-meal per pt  - Pt FS 300s with 38u ISS in addition to 50u Lantus, 10u premeal.   - Appreciate endo recs on increasing Basal bolus - 75u Lantus, 20u premeal.   - Will monitor FSS with moderate ISS    Ethics  - GOC discussed Patient wants full code.    FOR FOLLOW UP:    [ ] f/u GI recc, email consult sent out because of findings of Strep Gallolyticus bacteremia   [ ] monitor on telemetry, s/p AICD removal   [ ] Await final sensitivity of the streptococcus, f/u ID reccs   [ ] monitor renal function, is now currently improving. MICU Transfer Note    Transfer from: MICU    Transfer to: X Telemetry     Accepting Physician: Dr Roselyn Sow   Signout given to:     MICU COURSE:    : 59M PMH T2DM, HTN, CAD/MI s/p stents (most recent 2/2018), HFrEF (10-15%), ICD,  ischemic cardiomyopathy, COPD, HTN, GERD, BPH pw fever, n/v, diarrhea, back pain x 1 day, septic upon admission, admitted to MICU for management of hypotension requiring pressor support, found to have R hydronephrosis on CT potentially source of infxn.    In the ICU, patient was weaned on pressor support since 9/9, 9/10 AICD removed with plan to continue to place patient with defibrillator pads on the floor. ABX changed from vancomycin to ceftriaxone. Patient had developed MANJIT in setting of initial hypotention, but now renal function continuing to improve. Endo following for elevated blood sugars, which are now more optimally controlled.       ASSESSMENT & PLAN:   59M PMH T2DM, HTN, CAD/MI s/p stents (most recent 2/2018), ICM w/ HFrEF (10-15%) s/p AICD, COPD (not on home O2), HTN, GERD, BPH, who is admitted for sepsis 2/2 gram positive bacteremia. Admitted to MICU for hypotension yet did not require pressor.     Neuro  - AOx3, no active issues    Respiratory  - Small R pleural collection on CT, CXR with clr lungs (preliminary reads)  - c/w home albuterol prn  - Pt satting well on RA    Cardiovascular  # Hypotension secondary to Septic Shock  - currently not requiring IV pressor support.   - POCUS positive for B-lines, however no overt signs of pulmonary edema on CT Scan  - c/w Midodrine 10 q 8 to ween pressor support  - resuming home metoprolol but holding entresto, furosemide, indapamide.   - Diuresing as needed    # HFrEF s/p AICD  - most recent echo from 12/2019: EF 10-15% sev global LV systolic dysfunction. eccentric LVH  - cardiology consulted. appreciate recs.  - resumed b-blocker but holding onto rest of HF meds.  - AICD extraction this AM in setting of Group D Strep Bacteremia    # CAD s/p stent  - c/w home DAPT (ASA/PLV) and high dose statin.      GI/NUTRITION  - GI PCR negative. Stool culture pending  - Continuing home pantoprazole po 40mg  - diabetic diet.  - Consider GI consult for colonoscopy given Strep bovis bacteremia.     Renal  - MANJIT improving - BUN/SCr 39/1.80 (1.4-1.8 during 12/2019 admission)  - Peguero in place, adequate urine output 2300/24hrs.   - Holding home diuretics and will diurese as needed as above  - Atrophied R kidney w/ chronic R hydronephrosis which was also partially imaged on CT Chest from 11/11/2019  - nephrology and urology consult - no need for urgent nephrostomy tube, can place later when patient is stable or if Cr continues to worsen.     #BPH  - continuing home tamsulosin 0.4mg  - Peguero as above    Hematologic  - Monitor CBC, currently stable    #DVT ppx  - SCDs for DVT ppx    ID  # Strep Gallolyticus bacteremia  - S/p 1 dose vancomycin 1.5g. Trough 6.1.   - Appreciate ID recs for abx with culture update and vanc level.   - repeat BCx daily.  - Stool Cx/GI PCR pending, no growth yet.     ENDOCRINE:   - Pt w/ hx of IDDM on home Lantus 74u qhs and Humalog 50u pre-meal per pt  - Pt FS 300s with 38u ISS in addition to 50u Lantus, 10u premeal.   - Appreciate endo recs on increasing Basal bolus - 75u Lantus, 20u premeal.   - Will monitor FSS with moderate ISS    Ethics  - GOC discussed Patient wants full code.    FOR FOLLOW UP:    [ ] f/u GI recc, email consult sent out because of findings of Strep Gallolyticus bacteremia   [ ] monitor on telemetry, s/p AICD removal   [ ] SC ICD likely implant Monday by EP, NPO after midnight Sunday for Monday  [ ] Await final sensitivity of the streptococcus, f/u ID reccs   [ ] monitor renal function, is now currently improving. MICU Transfer Note    Transfer from: MICU    Transfer to: X Telemetry     Accepting Physician: Dr Roselyn Sow   Signout given to:     MICU COURSE:    : 59M PMH T2DM, HTN, CAD/MI s/p stents (most recent 2/2018), HFrEF (10-15%), ICD,  ischemic cardiomyopathy, COPD, HTN, GERD, BPH pw fever, n/v, diarrhea, back pain x 1 day, septic upon admission, admitted to MICU for management of hypotension requiring pressor support, found to have R hydronephrosis on CT potentially source of infxn.    In the ICU, patient was weaned on pressor support since 9/9, 9/10 AICD removed with plan to continue to place patient with defibrillator pads on the floor. ABX changed from vancomycin to ceftriaxone. Patient had developed MANJIT in setting of initial hypotention, but now renal function continuing to improve. Endo following for elevated blood sugars, which are now more optimally controlled.       ASSESSMENT & PLAN:   59M PMH T2DM, HTN, CAD/MI s/p stents (most recent 2/2018), ICM w/ HFrEF (10-15%) s/p AICD, COPD (not on home O2), HTN, GERD, BPH, who is admitted for sepsis 2/2 gram positive bacteremia. Admitted to MICU for hypotension yet did not require pressor.     Neuro  - AOx3, no active issues    Respiratory  - Small R pleural collection on CT, CXR with clr lungs (preliminary reads)  - c/w home albuterol prn  - Pt satting well on RA    Cardiovascular  # Hypotension secondary to Septic Shock  - currently not requiring IV pressor support.   - POCUS positive for B-lines, however no overt signs of pulmonary edema on CT Scan  - c/w Midodrine 10 q 8 to ween pressor support  - resuming home metoprolol but holding entresto, furosemide, indapamide.   - Diuresing as needed    # HFrEF s/p AICD  - most recent echo from 12/2019: EF 10-15% sev global LV systolic dysfunction. eccentric LVH  - cardiology consulted. appreciate recs.  - resumed b-blocker but holding onto rest of HF meds.  - AICD extraction this AM in setting of Group D Strep Bacteremia    # CAD s/p stent  - c/w home DAPT (ASA/PLV) and high dose statin.      GI/NUTRITION  - GI PCR negative. Stool culture pending  - Continuing home pantoprazole po 40mg  - diabetic diet.  - Consider GI consult for colonoscopy given Strep bovis bacteremia.     Renal  - MANJIT improving - BUN/SCr 39/1.80 (1.4-1.8 during 12/2019 admission)  - Peguero in place, adequate urine output 2300/24hrs.   - Holding home diuretics and will diurese as needed as above  - Atrophied R kidney w/ chronic R hydronephrosis which was also partially imaged on CT Chest from 11/11/2019  - nephrology and urology consult - no need for urgent nephrostomy tube, can place later when patient is stable or if Cr continues to worsen.     #BPH  - continuing home tamsulosin 0.4mg  - Peguero as above    Hematologic  - Monitor CBC, currently stable    #DVT ppx  - SCDs for DVT ppx    ID  # Strep Gallolyticus bacteremia  - S/p 1 dose vancomycin 1.5g. Trough 6.1.   - Appreciate ID recs for abx with culture update and vanc level.   - repeat BCx daily.  - Stool Cx/GI PCR pending, no growth yet.     ENDOCRINE:   - Pt w/ hx of IDDM on home Lantus 74u qhs and Humalog 50u pre-meal per pt  - Pt FS 300s with 38u ISS in addition to 50u Lantus, 10u premeal.   - Appreciate endo recs on increasing Basal bolus - 75u Lantus, 20u premeal.   - Will monitor FSS with moderate ISS    Ethics  - GOC discussed Patient wants full code.    FOR FOLLOW UP:    [ ] f/u GI recc, email consult sent out because of findings of Strep Gallolyticus bacteremia   [ ] monitor on telemetry, s/p AICD removal   [ ] SC ICD likely implant Monday by EP, NPO after midnight Sunday for Monday  [ ] Await final sensitivity of the streptococcus, f/u ID reccs   [ ] monitor renal function, is now currently improving  [ ] pending cardiology clearance for anesthesia and colonoscopy by GI, and if Plavix can be held for 5 days for procedure  [ ] will need to hold Plavix for 5 days prior to procedures (earliest will be Wednesday next week) MICU Transfer Note    Transfer from: MICU    Transfer to: X Telemetry     Accepting Physician: Dr Roselyn Sow   Signout given to:     MICU COURSE:    : 59M PMH T2DM, HTN, CAD/MI s/p stents (most recent 2/2018), HFrEF (10-15%), ICD,  ischemic cardiomyopathy, COPD, HTN, GERD, BPH pw fever, n/v, diarrhea, back pain x 1 day, septic upon admission, admitted to MICU for management of hypotension requiring pressor support, found to have R hydronephrosis on CT potentially source of infxn.    In the ICU, patient was weaned on pressor support since 9/9, 9/10 AICD removed with plan to continue to place patient with defibrillator pads on the floor. ABX changed from vancomycin to ceftriaxone. Patient had developed MANJIT in setting of initial hypotention, but now renal function continuing to improve. Endo following for elevated blood sugars, which are now more optimally controlled.       ASSESSMENT & PLAN:   59M PMH T2DM, HTN, CAD/MI s/p stents (most recent 2/2018), ICM w/ HFrEF (10-15%) s/p AICD, COPD (not on home O2), HTN, GERD, BPH, who is admitted for sepsis 2/2 gram positive bacteremia. Admitted to MICU for hypotension yet did not require pressor.     Neuro  - AOx3, no active issues    Respiratory  - Small R pleural collection on CT, CXR with clr lungs (preliminary reads)  - c/w home albuterol prn  - Pt satting well on RA    Cardiovascular  # Hypotension secondary to Septic Shock  - currently not requiring IV pressor support.   - POCUS positive for B-lines, however no overt signs of pulmonary edema on CT Scan  - c/w Midodrine 10 q 8 to ween pressor support  - resuming home metoprolol but holding entresto, furosemide, indapamide.   - Diuresing as needed    # HFrEF s/p AICD  - most recent echo from 12/2019: EF 10-15% sev global LV systolic dysfunction. eccentric LVH  - cardiology consulted. appreciate recs.  - resumed b-blocker but holding onto rest of HF meds.  - AICD extraction this AM in setting of Group D Strep Bacteremia    # CAD s/p stent  - c/w home DAPT (ASA/PLV) and high dose statin.      GI/NUTRITION  - GI PCR negative. Stool culture pending  - Continuing home pantoprazole po 40mg  - diabetic diet.  - Consider GI consult for colonoscopy given Strep bovis bacteremia.     Renal  - MANJIT improving - BUN/SCr 39/1.80 (1.4-1.8 during 12/2019 admission)  - Peguero in place, adequate urine output 2300/24hrs.   - Holding home diuretics and will diurese as needed as above  - Atrophied R kidney w/ chronic R hydronephrosis which was also partially imaged on CT Chest from 11/11/2019  - nephrology and urology consult - no need for urgent nephrostomy tube, can place later when patient is stable or if Cr continues to worsen.     #BPH  - continuing home tamsulosin 0.4mg  - Peguero as above    Hematologic  - Monitor CBC, currently stable    #DVT ppx  - SCDs for DVT ppx    ID  # Strep Gallolyticus bacteremia  - S/p 1 dose vancomycin 1.5g. Trough 6.1.   - Appreciate ID recs for abx with culture update and vanc level.   - repeat BCx daily.  - Stool Cx/GI PCR pending, no growth yet.     ENDOCRINE:   - Pt w/ hx of IDDM on home Lantus 74u qhs and Humalog 50u pre-meal per pt  - Pt FS 300s with 38u ISS in addition to 50u Lantus, 10u premeal.   - Appreciate endo recs on increasing Basal bolus, increased to 60U Lantus and 16U premeal  - Will monitor FSS with moderate ISS    Ethics  - GOC discussed Patient wants full code.    FOR FOLLOW UP:    [ ] f/u GI recc, email consult sent out because of findings of Strep Gallolyticus bacteremia   [ ] monitor on telemetry, s/p AICD removal   [ ] SC ICD likely implant Monday by EP, NPO after midnight Sunday for Monday  [ ] Await final sensitivity of the streptococcus, f/u ID reccs   [ ] monitor renal function, is now currently improving  [ ] pending cardiology clearance for anesthesia and colonoscopy by GI, and if Plavix can be held for 5 days for procedure  [ ] will need to hold Plavix for 5 days prior to procedures (earliest will be Wednesday next week)

## 2020-09-10 NOTE — PROGRESS NOTE ADULT - SUBJECTIVE AND OBJECTIVE BOX
Patient is a 59y old  Male who presents with a chief complaint of fever, cough, emesis (10 Sep 2020 14:31)    Being followed by ID for        Interval history:  No other acute events      ROS:  No cough,SOB,CP  No N/V/D  No abd pain  No urinary complaints  No HA  No joint or limb pain  No other complaints    PAST MEDICAL & SURGICAL HISTORY:  H/O gastroesophageal reflux (GERD)  History of COPD  History of ischemic cardiomyopathy  Heart failure with reduced ejection fraction  Hypertension  AICD (automatic cardioverter/defibrillator) present  Diabetes  Stented coronary artery  H/O vasectomy: 20 yrs ago ()    Allergies    No Known Allergies    Intolerances      Antimicrobials:    cefTRIAXone   IVPB 2000 milliGRAM(s) IV Intermittent every 24 hours    MEDICATIONS  (STANDING):  aspirin  chewable 81 milliGRAM(s) Oral daily  atorvastatin 80 milliGRAM(s) Oral at bedtime  cefTRIAXone   IVPB 2000 milliGRAM(s) IV Intermittent every 24 hours  chlorhexidine 4% Liquid 1 Application(s) Topical <User Schedule>  clopidogrel Tablet 75 milliGRAM(s) Oral daily  dextrose 5%. 1000 milliLiter(s) (50 mL/Hr) IV Continuous <Continuous>  dextrose 50% Injectable 12.5 Gram(s) IV Push once  dextrose 50% Injectable 25 Gram(s) IV Push once  dextrose 50% Injectable 25 Gram(s) IV Push once  influenza   Vaccine 0.5 milliLiter(s) IntraMuscular once  insulin glargine Injectable (LANTUS) 50 Unit(s) SubCutaneous at bedtime  insulin lispro (HumaLOG) corrective regimen sliding scale   SubCutaneous three times a day before meals  insulin lispro (HumaLOG) corrective regimen sliding scale   SubCutaneous at bedtime  insulin lispro Injectable (HumaLOG) 10 Unit(s) SubCutaneous three times a day before meals  insulin regular Infusion 6 Unit(s)/Hr (6 mL/Hr) IV Continuous <Continuous>  loratadine 10 milliGRAM(s) Oral daily  metoprolol tartrate 25 milliGRAM(s) Oral two times a day  midodrine 10 milliGRAM(s) Oral every 8 hours  ondansetron Injectable 4 milliGRAM(s) IV Push once  pantoprazole    Tablet 40 milliGRAM(s) Oral before breakfast  potassium chloride    Tablet ER 40 milliEquivalent(s) Oral every 4 hours  tamsulosin 0.4 milliGRAM(s) Oral at bedtime      Vital Signs Last 24 Hrs  T(C): 37.2 (09-10-20 @ 12:10), Max: 37.5 (20 @ 19:00)  T(F): 99 (09-10-20 @ 12:10), Max: 99.5 (20 @ 19:00)  HR: 116 (09-10-20 @ 14:00) (104 - 116)  BP: 119/73 (09-10-20 @ 12:10) (93/54 - 121/71)  BP(mean): 90 (09-10-20 @ 12:10) (68 - 92)  RR: 19 (09-10-20 @ 14:00) (0 - 27)  SpO2: 99% (09-10-20 @ 14:00) (89% - 100%)    Physical Exam:    Constitutional well preserved,comfortable,pleasant    HEENT PERRLA EOMI,No pallor or icterus    No oral exudate or erythema    Neck supple no JVD or LN    Chest Good AE,CTA    CVS RRR S1 S2 WNl No murmur or rub or gallop    Abd soft BS normal No tenderness no masses    Ext No cyanosis clubbing or edema    IV site no erythema tenderness or discharge    Joints no swelling or LOM    CNS AAO X 3 no focal    Lab Data:                          12.2   8.59  )-----------( 113      ( 10 Sep 2020 00:24 )             37.0       09-10    135  |  96  |  37<H>  ----------------------------<  312<H>  3.2<L>   |  25  |  1.54<H>    Ca    8.5      10 Sep 2020 14:38  Phos  2.9     09-10  Mg     2.2     09-10    TPro  7.0  /  Alb  3.9  /  TBili  0.7  /  DBili  x   /  AST  41<H>  /  ALT  41  /  AlkPhos  66        Urinalysis Basic - ( 09 Sep 2020 07:23 )    Color: Light Yellow / Appearance: Clear / S.014 / pH: x  Gluc: x / Ketone: Negative  / Bili: Negative / Urobili: Negative   Blood: x / Protein: Trace / Nitrite: Negative   Leuk Esterase: Negative / RBC: 2 /hpf / WBC 0 /HPF   Sq Epi: x / Non Sq Epi: 0 /hpf / Bacteria: Negative        .Stool Feces sarina jose  20   GI PCR Results: NOT detected  *******Please Note:*******  GI panel PCR evaluates for:  Campylobacter, Plesiomonas shigelloides, Salmonella,  Vibrio, Yersinia enterocolitica, Enteroaggregative  Escherichia coli (EAEC), Enteropathogenic E.coli (EPEC),  Enterotoxigenic E. coli (ETEC) lt/st, Shiga-like  toxin-producing E. coli (STEC) stx1/stx2,  Shigella/ Enteroinvasive E. coli (EIEC), Cryptosporidium,  Cyclospora cayetanensis, Entamoeba histolytica,  Giardia lamblia, Adenovirus F 40/41, Astrovirus,  Norovirus GI/GII, Rotavirus A, Sapovirus  --  --      .Urine Clean Catch (Midstream)  20   <10,000 CFU/mL Normal Urogenital Dorothy  --  --      .Blood Blood-Peripheral  20   Growth in aerobic and anaerobic bottles: Streptococcus gallolyticus  Susceptibility to follow.  "Due to technical problems, Proteus sp. will Not be reported as part of  the BCID panel until further notice"  ***Blood Panel PCR results on this specimen are available  approximately 3 hours after the Gram stain result.***  Gram stain, PCR, and/or culture results may not always  correspond due to difference in methodologies.  ************************************************************  This PCR assay was performed using [employer].  The following targets are tested for: Enterococcus,  vancomycin resistant enterococci, Listeria monocytogenes,  coagulase negative staphylococci, S. aureus,  methicillin resistant S. aureus, Streptococcus agalactiae  (Group B), S. pneumoniae, S. pyogenes (Group A),  Acinetobacter baumannii, Enterobacter cloacae, E. coli,  Klebsiella oxytoca, K. pneumoniae, Proteus sp.,  Serratia marcescens, Haemophilus influenzae,  Neisseria meningitidis, Pseudomonas aeruginosa, Candida  albicans, C. glabrata, C krusei, C parapsilosis,  C. tropicalis and the KPC resistance gene.  --  Blood Culture PCR                Vancomycin Level, Random: 6.1 ug/mL (09-10-20 @ 05:38)      WBC Count: 8.59 (09-10-20 @ 00:24)  WBC Count: 11.16 (20 @ 04:33)  WBC Count: 10.28 (20 @ 20:42) Patient is a 59y old  Male who presents with a chief complaint of fever, cough, emesis (10 Sep 2020 14:31)    Being followed by ID for        Interval history:  AICD removed  Pt notes he still has diarrhea  ? vegetation on mitral valve on the mitral valve  No other acute events        PAST MEDICAL & SURGICAL HISTORY:  H/O gastroesophageal reflux (GERD)  History of COPD  History of ischemic cardiomyopathy  Heart failure with reduced ejection fraction  Hypertension  AICD (automatic cardioverter/defibrillator) present  Diabetes  Stented coronary artery  H/O vasectomy: 20 yrs ago ()    Allergies    No Known Allergies    Intolerances      Antimicrobials:    cefTRIAXone   IVPB 2000 milliGRAM(s) IV Intermittent every 24 hours    MEDICATIONS  (STANDING):  aspirin  chewable 81 milliGRAM(s) Oral daily  atorvastatin 80 milliGRAM(s) Oral at bedtime  cefTRIAXone   IVPB 2000 milliGRAM(s) IV Intermittent every 24 hours  chlorhexidine 4% Liquid 1 Application(s) Topical <User Schedule>  clopidogrel Tablet 75 milliGRAM(s) Oral daily  dextrose 5%. 1000 milliLiter(s) (50 mL/Hr) IV Continuous <Continuous>  dextrose 50% Injectable 12.5 Gram(s) IV Push once  dextrose 50% Injectable 25 Gram(s) IV Push once  dextrose 50% Injectable 25 Gram(s) IV Push once  influenza   Vaccine 0.5 milliLiter(s) IntraMuscular once  insulin glargine Injectable (LANTUS) 50 Unit(s) SubCutaneous at bedtime  insulin lispro (HumaLOG) corrective regimen sliding scale   SubCutaneous three times a day before meals  insulin lispro (HumaLOG) corrective regimen sliding scale   SubCutaneous at bedtime  insulin lispro Injectable (HumaLOG) 10 Unit(s) SubCutaneous three times a day before meals  insulin regular Infusion 6 Unit(s)/Hr (6 mL/Hr) IV Continuous <Continuous>  loratadine 10 milliGRAM(s) Oral daily  metoprolol tartrate 25 milliGRAM(s) Oral two times a day  midodrine 10 milliGRAM(s) Oral every 8 hours  ondansetron Injectable 4 milliGRAM(s) IV Push once  pantoprazole    Tablet 40 milliGRAM(s) Oral before breakfast  potassium chloride    Tablet ER 40 milliEquivalent(s) Oral every 4 hours  tamsulosin 0.4 milliGRAM(s) Oral at bedtime      Vital Signs Last 24 Hrs  T(C): 37.2 (09-10-20 @ 12:10), Max: 37.5 (20 @ 19:00)  T(F): 99 (09-10-20 @ 12:10), Max: 99.5 (20 @ 19:00)  HR: 116 (09-10-20 @ 14:00) (104 - 116)  BP: 119/73 (09-10-20 @ 12:10) (93/54 - 121/71)  BP(mean): 90 (09-10-20 @ 12:10) (68 - 92)  RR: 19 (09-10-20 @ 14:00) (0 - 27)  SpO2: 99% (09-10-20 @ 14:00) (89% - 100%)    Physical Exam:    Constitutional well preserved,comfortable,pleasant    HEENT PERRLA EOMI,No pallor or icterus    No oral exudate or erythema    Neck supple no JVD or LN    Chest Good AE,CTA    CVS RRR S1 S2     Abd soft BS normal No tenderness no masses    Ext No cyanosis clubbing or edema    IV site no erythema tenderness or discharge    Joints no swelling or LOM    CNS AAO X 3 no focal    Lab Data:                          12.2   8.59  )-----------( 113      ( 10 Sep 2020 00:24 )             37.0       -10    135  |  96  |  37<H>  ----------------------------<  312<H>  3.2<L>   |  25  |  1.54<H>    Ca    8.5      10 Sep 2020 14:38  Phos  2.9     09-10  Mg     2.2     09-10    TPro  7.0  /  Alb  3.9  /  TBili  0.7  /  DBili  x   /  AST  41<H>  /  ALT  41  /  AlkPhos  66        Urinalysis Basic - ( 09 Sep 2020 07:23 )    Color: Light Yellow / Appearance: Clear / S.014 / pH: x  Gluc: x / Ketone: Negative  / Bili: Negative / Urobili: Negative   Blood: x / Protein: Trace / Nitrite: Negative   Leuk Esterase: Negative / RBC: 2 /hpf / WBC 0 /HPF   Sq Epi: x / Non Sq Epi: 0 /hpf / Bacteria: Negative    Culture - Blood (20 @ 00:57)    Gram Stain:   Growth in anaerobic bottle: Gram Positive Cocci in Pairs and Chains  Growth in aerobic bottle: Gram Positive Cocci in Pairs and Chains    -  Streptococcus sp. (Not Grp A, B or S pneumoniae): Detec    Specimen Source: .Blood Blood-Peripheral    Organism: Blood Culture PCR    Culture Results:   Growth in aerobic and anaerobic bottles: Streptococcus gallolyticus  Susceptibility to follow.  "Due to technical problems, Proteus sp. will Not be reported as part of  the BCID panel until further notice"  ***Blood Panel PCR results on this specimen are available  approximately 3 hours after the Gram stain result.***  Gram stain, PCR, and/or culture results may not always  correspond due to difference in methodologies.  ************************************************************  This PCR assay was performed using [employer].  The following targets are tested for: Enterococcus,  vancomycin resistant enterococci, Listeria monocytogenes,  coagulase negative staphylococci, S. aureus,  methicillin resistant S. aureus, Streptococcus agalactiae  (Group B), S. pneumoniae, S. pyogenes (Group A),  Acinetobacter baumannii, Enterobacter cloacae, E. coli,  Klebsiella oxytoca, K. pneumoniae, Proteus sp.,  Serratia marcescens, Haemophilus influenzae,  Neisseria meningitidis, Pseudomonas aeruginosa, Candida  albicans, C. glabrata, C krusei, C parapsilosis,  C. tropicalis and the KPC resistance gene.    Organism Identification: Blood Culture PCR    Method Type: PCR        .Stool Feces [facility]  20   GI PCR Results: NOT detected  *******Please Note:*******  GI panel PCR evaluates for:  Campylobacter, Plesiomonas shigelloides, Salmonella,  Vibrio, Yersinia enterocolitica, Enteroaggregative  Escherichia coli (EAEC), Enteropathogenic E.coli (EPEC),  Enterotoxigenic E. coli (ETEC) lt/st, Shiga-like  toxin-producing E. coli (STEC) stx1/stx2,  Shigella/ Enteroinvasive E. coli (EIEC), Cryptosporidium,  Cyclospora cayetanensis, Entamoeba histolytica,  Giardia lamblia, Adenovirus F 40/41, Astrovirus,  Norovirus GI/GII, Rotavirus A, Sapovirus  --  --      .Urine Clean Catch (Midstream)  20   <10,000 CFU/mL Normal Urogenital Dorothy  --  --      .Blood Blood-Peripheral  20   Growth in aerobic and anaerobic bottles: Streptococcus gallolyticus  Susceptibility to follow.  "Due to technical problems, Proteus sp. will Not be reported as part of  the BCID panel until further notice"  ***Blood Panel PCR results on this specimen are available  approximately 3 hours after the Gram stain result.***  Gram stain, PCR, and/or culture results may not always  correspond due to difference in methodologies.  ************************************************************  This PCR assay was performed using [employer].  The following targets are tested for: Enterococcus,  vancomycin resistant enterococci, Listeria monocytogenes,  coagulase negative staphylococci, S. aureus,  methicillin resistant S. aureus, Streptococcus agalactiae  (Group B), S. pneumoniae, S. pyogenes (Group A),  Acinetobacter baumannii, Enterobacter cloacae, E. coli,  Klebsiella oxytoca, K. pneumoniae, Proteus sp.,  Serratia marcescens, Haemophilus influenzae,  Neisseria meningitidis, Pseudomonas aeruginosa, Candida  albicans, C. glabrata, C krusei, C parapsilosis,  C. tropicalis and the KPC resistance gene.  --  Blood Culture PCR          < from: TTE with Doppler (w/Cont) (20 @ 05:44) >  Conclusions:  1. Aortic valve not well visualized; appears calcified.  2. Left ventricular enlargement.  3. Endocardial visualization enhanced with intravenous  injection of Ultrasonic Enhancing Agent (Definity).  Severe  global left ventricular systolic dysfunction.No obvious LV  thrombus. Estimated EF of 15-20%. No left ventricular  thrombus.  4. Severe diastolic dysfunction (Stage III).  5. A device wire is noted in the right heart.  6. Tricuspid valve not well visualized. No tricuspid  regurgitation.  7. Pulmonic valve not well visualized.  Unable to rule out endocarditis. Consider DONNIE if clinically  indicated.  ------------------------------------------------------------------------  Confirmed on  9/10/2020 - 11:09:29 by Raza Kraft M.D.  ------------------------------------------------------------------------    < end of copied text >            Vancomycin Level, Random: 6.1 ug/mL (09-10-20 @ 05:38)      WBC Count: 8.59 (09-10-20 @ 00:24)  WBC Count: 11.16 (20 @ 04:33)  WBC Count: 10.28 (20 @ 20:42)

## 2020-09-10 NOTE — PROGRESS NOTE ADULT - SUBJECTIVE AND OBJECTIVE BOX
INTERVAL HPI/OVERNIGHT EVENTS: Glucose in 300s, plan for for extraction AICD this AM. Vanc Trough 6.1 this AM.   Patient anxious for the procedure, but otherwise is feeling well. Denies any fever, chills, pain. Reports being able to walk around yesterday.     MEDICATIONS  (STANDING):  aspirin  chewable 81 milliGRAM(s) Oral daily  atorvastatin 80 milliGRAM(s) Oral at bedtime  cefTRIAXone   IVPB 2000 milliGRAM(s) IV Intermittent every 24 hours  chlorhexidine 4% Liquid 1 Application(s) Topical <User Schedule>  clopidogrel Tablet 75 milliGRAM(s) Oral daily  dextrose 5%. 1000 milliLiter(s) (50 mL/Hr) IV Continuous <Continuous>  dextrose 50% Injectable 12.5 Gram(s) IV Push once  dextrose 50% Injectable 25 Gram(s) IV Push once  dextrose 50% Injectable 25 Gram(s) IV Push once  influenza   Vaccine 0.5 milliLiter(s) IntraMuscular once  insulin glargine Injectable (LANTUS) 50 Unit(s) SubCutaneous at bedtime  insulin lispro (HumaLOG) corrective regimen sliding scale   SubCutaneous three times a day before meals  insulin lispro (HumaLOG) corrective regimen sliding scale   SubCutaneous at bedtime  insulin lispro Injectable (HumaLOG) 10 Unit(s) SubCutaneous three times a day before meals  loratadine 10 milliGRAM(s) Oral daily  metoprolol tartrate 25 milliGRAM(s) Oral two times a day  midodrine 10 milliGRAM(s) Oral every 8 hours  ondansetron Injectable 4 milliGRAM(s) IV Push once  pantoprazole    Tablet 40 milliGRAM(s) Oral before breakfast  tamsulosin 0.4 milliGRAM(s) Oral at bedtime    MEDICATIONS  (PRN):  acetaminophen   Tablet .. 650 milliGRAM(s) Oral every 6 hours PRN Temp greater or equal to 38C (100.4F), Mild Pain (1 - 3)  ALBUTerol    90 MICROgram(s) HFA Inhaler 2 Puff(s) Inhalation every 12 hours PRN Shortness of Breath and/or Wheezing  dextrose 40% Gel 15 Gram(s) Oral once PRN Blood Glucose LESS THAN 70 milliGRAM(s)/deciliter  glucagon  Injectable 1 milliGRAM(s) IntraMuscular once PRN Glucose LESS THAN 70 milligrams/deciliter      OBJECTIVE:  ICU Vital Signs Last 24 Hrs  T(C): 37.5 (09 Sep 2020 19:00), Max: 38.2 (09 Sep 2020 16:00)  T(F): 99.5 (09 Sep 2020 19:00), Max: 100.7 (09 Sep 2020 16:00)  HR: 107 (10 Sep 2020 08:00) (104 - 115)  BP: 110/71 (10 Sep 2020 08:00) (93/54 - 121/71)  BP(mean): 85 (10 Sep 2020 08:00) (68 - 92)  ABP: --  ABP(mean): --  RR: 0 (10 Sep 2020 08:00) (0 - 28)  SpO2: 91% (10 Sep 2020 08:00) (89% - 98%)    I&O's Detail    09 Sep 2020 07:01  -  10 Sep 2020 07:00  --------------------------------------------------------  IN:    IV PiggyBack: 750 mL    Oral Fluid: 500 mL  Total IN: 1250 mL    OUT:    Indwelling Catheter - Urethral: 2305 mL    Voided: 50 mL  Total OUT: 2355 mL    Total NET: -1105 mL        PHYSICAL EXAM:    Constitutional: NAD. well-developed; well-groomed; well-nourished;  HEENT: AT/NC, PERRLA; EOMI, MMM, no oropharyngeal lesions, no erythema, no exudates,   Respiratory: CTAB. equal aeration bilaterally. no wheezing, no crackes, no rhonchi.   Cardiovascular: RRR, no M/R/G. no JVD  Gastrointestinal: soft; NT/ND, obese, +BS, no rebounding tenderness / guarding / HSM / mass / ascites.  Extremities: no clubbing; no cyanosis; 1+ LE edema to shins, non-tender to palpation, DP and Radial pulses intact.  Skin: warm and dry; color normal: no rash: no ulcers  Neurological: A&Ox 3; responds to pain; responds to verbal commands; CN nerves grossly intact.     Labs                          12.2   8.59  )-----------( 113      ( 10 Sep 2020 00:24 )             37.0       09-10    133<L>  |  94<L>  |  39<H>  ----------------------------<  331<H>  3.3<L>   |  21<L>  |  1.80<H>    Ca    8.9      10 Sep 2020 01:20  Phos  2.5     09-10  Mg     2.0     09-10    TPro  7.0  /  Alb  3.9  /  TBili  0.7  /  DBili  x   /  AST  41<H>  /  ALT  41  /  AlkPhos  66        LIVER FUNCTIONS - ( 09 Sep 2020 04:36 )  Alb: 3.9 g/dL / Pro: 7.0 g/dL / ALK PHOS: 66 U/L / ALT: 41 U/L / AST: 41 U/L / GGT: x             PT/INR - ( 10 Sep 2020 00:24 )   PT: 13.3 sec;   INR: 1.12 ratio       PTT - ( 10 Sep 2020 00:24 )  PTT:29.1 sec    CAPILLARY BLOOD GLUCOSE    POCT Blood Glucose.: 326 mg/dL (10 Sep 2020 06:41)  POCT Blood Glucose.: 359 mg/dL (09 Sep 2020 21:28)  POCT Blood Glucose.: 353 mg/dL (09 Sep 2020 18:19)      ABG - ( 09 Sep 2020 05:40 )  pH, Arterial: 7.45  pH, Blood: x     /  pCO2: 35    /  pO2: 42    / HCO3: 24    / Base Excess: .6    /  SaO2: 73          Urinalysis Basic - ( 09 Sep 2020 07:23 )    Color: Light Yellow / Appearance: Clear / S.014 / pH: x  Gluc: x / Ketone: Negative  / Bili: Negative / Urobili: Negative   Blood: x / Protein: Trace / Nitrite: Negative   Leuk Esterase: Negative / RBC: 2 /hpf / WBC 0 /HPF   Sq Epi: x / Non Sq Epi: 0 /hpf / Bacteria: Negative      EKG:   MICROBIOLOGY:    GI PCR Panel, Stool (collected 09 Sep 2020 10:21)  Source: .Stool Feces sarina garcia  Final Report (09 Sep 2020 12:58):    GI PCR Results: NOT detected    *******Please Note:*******    GI panel PCR evaluates for:    Campylobacter, Plesiomonas shigelloides, Salmonella,    Vibrio, Yersinia enterocolitica, Enteroaggregative    Escherichia coli (EAEC), Enteropathogenic E.coli (EPEC),    Enterotoxigenic E. coli (ETEC) lt/st, Shiga-like    toxin-producing E. coli (STEC) stx1/stx2,    Shigella/ Enteroinvasive E. coli (EIEC), Cryptosporidium,    Cyclospora cayetanensis, Entamoeba histolytica,    Giardia lamblia, Adenovirus F 40/41, Astrovirus,    Norovirus GI/GII, Rotavirus A, Sapovirus    Culture - Stool (20 @ 10:21)    Specimen Source: .Stool Feces  sarina jose    Culture Results:   No enteric pathogens to date: Final culture pending    Culture - Blood (20 @ 00:57)    Gram Stain:   Growth in aerobic and anaerobic bottles: Gram Positive Cocci in Pairs and  Chains    Specimen Source: .Blood Blood-Peripheral    Culture Results:   Growth in aerobic and anaerobic bottles: Streptococcus gallolyticus  See previous culture [id number]        IMAGING:      < from: CT Chest No Cont (20 @ 01:27) >  IMPRESSION:  No pneumonia.    Emphysema.    No bowel or obstruction.    Hepatomegaly and diffuse hepatic steatosis.    Splenomegaly.    Atrophic right kidney, with severe hydronephrosis. A 0.7 cm soft tissue density is noted in the interpolar region of the right kidney and is incompletely characterized. Ultrasound may be helpful for further evaluation.    < end of copied text >

## 2020-09-10 NOTE — PROGRESS NOTE ADULT - SUBJECTIVE AND OBJECTIVE BOX
[facility]   [phone]  RENAL FOLLOW UP NOTE  --------------------------------------------------------------------------------  Chief Complaint:    24 hour events/subjective:  seen this afternoon   no c/o sob   pt has hypoxemia when falls asleep   on O2   s/p ICD extraction       PAST HISTORY  --------------------------------------------------------------------------------  No significant changes to PMH, PSH, FHx, SHx, unless otherwise noted    ALLERGIES & MEDICATIONS  --------------------------------------------------------------------------------  Allergies    No Known Allergies    Intolerances      Standing Inpatient Medications  aspirin  chewable 81 milliGRAM(s) Oral daily  atorvastatin 80 milliGRAM(s) Oral at bedtime  cefTRIAXone   IVPB 2000 milliGRAM(s) IV Intermittent every 24 hours  chlorhexidine 4% Liquid 1 Application(s) Topical <User Schedule>  clopidogrel Tablet 75 milliGRAM(s) Oral daily  dextrose 5%. 1000 milliLiter(s) IV Continuous <Continuous>  dextrose 50% Injectable 12.5 Gram(s) IV Push once  dextrose 50% Injectable 25 Gram(s) IV Push once  dextrose 50% Injectable 25 Gram(s) IV Push once  influenza   Vaccine 0.5 milliLiter(s) IntraMuscular once  insulin glargine Injectable (LANTUS) 50 Unit(s) SubCutaneous at bedtime  insulin lispro (HumaLOG) corrective regimen sliding scale   SubCutaneous three times a day before meals  insulin lispro (HumaLOG) corrective regimen sliding scale   SubCutaneous at bedtime  insulin lispro Injectable (HumaLOG) 10 Unit(s) SubCutaneous three times a day before meals  insulin regular Infusion 6 Unit(s)/Hr IV Continuous <Continuous>  loratadine 10 milliGRAM(s) Oral daily  metoprolol tartrate 25 milliGRAM(s) Oral two times a day  midodrine 10 milliGRAM(s) Oral every 8 hours  ondansetron Injectable 4 milliGRAM(s) IV Push once  pantoprazole    Tablet 40 milliGRAM(s) Oral before breakfast  tamsulosin 0.4 milliGRAM(s) Oral at bedtime    PRN Inpatient Medications  acetaminophen   Tablet .. 650 milliGRAM(s) Oral every 6 hours PRN  ALBUTerol    90 MICROgram(s) HFA Inhaler 2 Puff(s) Inhalation every 12 hours PRN  dextrose 40% Gel 15 Gram(s) Oral once PRN  glucagon  Injectable 1 milliGRAM(s) IntraMuscular once PRN      REVIEW OF SYSTEMS  --------------------------------------------------------------------------------    Gen: denies fevers/chills,  CVS: denies chest pain/palpitations  Resp: denies SOB/Cough  GI: Denies N/V/Abd pain  : Denies dysuria    All other systems were reviewed and are negative, except as noted.    VITALS/PHYSICAL EXAM  --------------------------------------------------------------------------------  T(C): 37.2 (09-10-20 @ 12:10), Max: 37.2 (09-10-20 @ 12:10)  HR: 115 (09-10-20 @ 17:00) (104 - 123)  BP: 119/73 (09-10-20 @ 12:10) (93/54 - 121/71)  RR: 28 (09-10-20 @ 17:00) (0 - 28)  SpO2: 96% (09-10-20 @ 17:00) (89% - 100%)  Wt(kg): --  Height (cm): 195.58 (09-10-20 @ 07:24)  Weight (kg): 129.5 (09-10-20 @ 07:24)  BMI (kg/m2): 33.9 (09-10-20 @ 07:24)  BSA (m2): 2.6 (09-10-20 @ 07:24)      09-09-20 @ 07:01  -  09-10-20 @ 07:00  --------------------------------------------------------  IN: 1250 mL / OUT: 2355 mL / NET: -1105 mL    09-10-20 @ 07:01  -  09-10-20 @ 19:37  --------------------------------------------------------  IN: 0 mL / OUT: 550 mL / NET: -550 mL      Physical Exam:  	    Gen: Non toxic comfortable appearing   	no jvd  	Pulm: decrease bs  no rales or ronchi or wheezing  	CV: RRR, S1S2; no rub  	Abd: +BS, soft, nontender/nondistended  	: No suprapubic tenderness  	UE: Warm, no cyanosis  no clubbing,  no edema  	LE: Warm, no cyanosis  no clubbing, no edema  	Neuro: alert and oriented. speech coherent       LABS/STUDIES  --------------------------------------------------------------------------------              12.2   8.59  >-----------<  113      [09-10-20 @ 00:24]              37.0     135  |  96  |  37  ----------------------------<  312      [09-10-20 @ 14:38]  3.2   |  25  |  1.54        Ca     8.5     [09-10-20 @ 14:38]      Mg     2.2     [09-10-20 @ 14:38]      Phos  2.9     [09-10-20 @ 14:38]    TPro  7.0  /  Alb  3.9  /  TBili  0.7  /  DBili  x   /  AST  41  /  ALT  41  /  AlkPhos  66  [09-09-20 @ 04:36]    PT/INR: PT 13.3 , INR 1.12       [09-10-20 @ 00:24]  PTT: 29.1       [09-10-20 @ 00:24]      Creatinine Trend:  SCr 1.54 [09-10 @ 14:38]  SCr 1.80 [09-10 @ 01:20]  SCr 2.02 [09-09 @ 14:20]  SCr 2.07 [09-09 @ 04:36]  SCr 1.88 [09-08 @ 20:42]              Urinalysis - [09-09-20 @ 07:23]      Color Light Yellow / Appearance Clear / SG 1.014 / pH 6.0      Gluc Trace / Ketone Negative  / Bili Negative / Urobili Negative       Blood Trace / Protein Trace / Leuk Est Negative / Nitrite Negative      RBC 2 / WBC 0 / Hyaline 4 / Gran  / Sq Epi  / Non Sq Epi 0 / Bacteria Negative      HbA1c 8.9      [12-16-19 @ 08:15]  TSH 1.74      [09-09-20 @ 07:25]

## 2020-09-11 LAB
-  CEFTRIAXONE: SIGNIFICANT CHANGE UP
-  CLINDAMYCIN: SIGNIFICANT CHANGE UP
-  ERYTHROMYCIN: SIGNIFICANT CHANGE UP
-  LEVOFLOXACIN: SIGNIFICANT CHANGE UP
-  PENICILLIN: SIGNIFICANT CHANGE UP
-  VANCOMYCIN: SIGNIFICANT CHANGE UP
ALBUMIN SERPL ELPH-MCNC: 3.4 G/DL — SIGNIFICANT CHANGE UP (ref 3.3–5)
ALP SERPL-CCNC: 58 U/L — SIGNIFICANT CHANGE UP (ref 40–120)
ALT FLD-CCNC: 34 U/L — SIGNIFICANT CHANGE UP (ref 10–45)
ANION GAP SERPL CALC-SCNC: 13 MMOL/L — SIGNIFICANT CHANGE UP (ref 5–17)
ANION GAP SERPL CALC-SCNC: 15 MMOL/L — SIGNIFICANT CHANGE UP (ref 5–17)
APTT BLD: 27.3 SEC — LOW (ref 27.5–35.5)
AST SERPL-CCNC: 27 U/L — SIGNIFICANT CHANGE UP (ref 10–40)
BASE EXCESS BLDA CALC-SCNC: 3.4 MMOL/L — HIGH (ref -2–2)
BASOPHILS # BLD AUTO: 0.03 K/UL — SIGNIFICANT CHANGE UP (ref 0–0.2)
BASOPHILS NFR BLD AUTO: 0.4 % — SIGNIFICANT CHANGE UP (ref 0–2)
BILIRUB SERPL-MCNC: 0.6 MG/DL — SIGNIFICANT CHANGE UP (ref 0.2–1.2)
BUN SERPL-MCNC: 39 MG/DL — HIGH (ref 7–23)
BUN SERPL-MCNC: 45 MG/DL — HIGH (ref 7–23)
CALCIUM SERPL-MCNC: 8.9 MG/DL — SIGNIFICANT CHANGE UP (ref 8.4–10.5)
CALCIUM SERPL-MCNC: 9.2 MG/DL — SIGNIFICANT CHANGE UP (ref 8.4–10.5)
CHLORIDE SERPL-SCNC: 94 MMOL/L — LOW (ref 96–108)
CHLORIDE SERPL-SCNC: 98 MMOL/L — SIGNIFICANT CHANGE UP (ref 96–108)
CO2 BLDA-SCNC: 29 MMOL/L — SIGNIFICANT CHANGE UP (ref 22–30)
CO2 SERPL-SCNC: 23 MMOL/L — SIGNIFICANT CHANGE UP (ref 22–31)
CO2 SERPL-SCNC: 25 MMOL/L — SIGNIFICANT CHANGE UP (ref 22–31)
CREAT SERPL-MCNC: 1.78 MG/DL — HIGH (ref 0.5–1.3)
CREAT SERPL-MCNC: 2.04 MG/DL — HIGH (ref 0.5–1.3)
CULTURE RESULTS: NO GROWTH — SIGNIFICANT CHANGE UP
CULTURE RESULTS: SIGNIFICANT CHANGE UP
CULTURE RESULTS: SIGNIFICANT CHANGE UP
EOSINOPHIL # BLD AUTO: 0.02 K/UL — SIGNIFICANT CHANGE UP (ref 0–0.5)
EOSINOPHIL NFR BLD AUTO: 0.2 % — SIGNIFICANT CHANGE UP (ref 0–6)
GAS PNL BLDA: SIGNIFICANT CHANGE UP
GLUCOSE BLDC GLUCOMTR-MCNC: 233 MG/DL — HIGH (ref 70–99)
GLUCOSE BLDC GLUCOMTR-MCNC: 238 MG/DL — HIGH (ref 70–99)
GLUCOSE BLDC GLUCOMTR-MCNC: 249 MG/DL — HIGH (ref 70–99)
GLUCOSE BLDC GLUCOMTR-MCNC: 287 MG/DL — HIGH (ref 70–99)
GLUCOSE BLDC GLUCOMTR-MCNC: 321 MG/DL — HIGH (ref 70–99)
GLUCOSE SERPL-MCNC: 228 MG/DL — HIGH (ref 70–99)
GLUCOSE SERPL-MCNC: 264 MG/DL — HIGH (ref 70–99)
HCO3 BLDA-SCNC: 28 MMOL/L — SIGNIFICANT CHANGE UP (ref 21–29)
HCT VFR BLD CALC: 34.7 % — LOW (ref 39–50)
HGB BLD-MCNC: 10.8 G/DL — LOW (ref 13–17)
HOROWITZ INDEX BLDA+IHG-RTO: 28 — SIGNIFICANT CHANGE UP
IMM GRANULOCYTES NFR BLD AUTO: 0.6 % — SIGNIFICANT CHANGE UP (ref 0–1.5)
INR BLD: 1.12 RATIO — SIGNIFICANT CHANGE UP (ref 0.88–1.16)
LYMPHOCYTES # BLD AUTO: 1.11 K/UL — SIGNIFICANT CHANGE UP (ref 1–3.3)
LYMPHOCYTES # BLD AUTO: 13.2 % — SIGNIFICANT CHANGE UP (ref 13–44)
MAGNESIUM SERPL-MCNC: 2.2 MG/DL — SIGNIFICANT CHANGE UP (ref 1.6–2.6)
MAGNESIUM SERPL-MCNC: 2.4 MG/DL — SIGNIFICANT CHANGE UP (ref 1.6–2.6)
MCHC RBC-ENTMCNC: 25.2 PG — LOW (ref 27–34)
MCHC RBC-ENTMCNC: 31.1 GM/DL — LOW (ref 32–36)
MCV RBC AUTO: 81.1 FL — SIGNIFICANT CHANGE UP (ref 80–100)
METHOD TYPE: SIGNIFICANT CHANGE UP
METHOD TYPE: SIGNIFICANT CHANGE UP
MONOCYTES # BLD AUTO: 0.88 K/UL — SIGNIFICANT CHANGE UP (ref 0–0.9)
MONOCYTES NFR BLD AUTO: 10.5 % — SIGNIFICANT CHANGE UP (ref 2–14)
NEUTROPHILS # BLD AUTO: 6.33 K/UL — SIGNIFICANT CHANGE UP (ref 1.8–7.4)
NEUTROPHILS NFR BLD AUTO: 75.1 % — SIGNIFICANT CHANGE UP (ref 43–77)
NRBC # BLD: 0 /100 WBCS — SIGNIFICANT CHANGE UP (ref 0–0)
ORGANISM # SPEC MICROSCOPIC CNT: SIGNIFICANT CHANGE UP
PCO2 BLDA: 42 MMHG — SIGNIFICANT CHANGE UP (ref 32–46)
PH BLDA: 7.43 — SIGNIFICANT CHANGE UP (ref 7.35–7.45)
PHOSPHATE SERPL-MCNC: 2.5 MG/DL — SIGNIFICANT CHANGE UP (ref 2.5–4.5)
PHOSPHATE SERPL-MCNC: 2.6 MG/DL — SIGNIFICANT CHANGE UP (ref 2.5–4.5)
PLATELET # BLD AUTO: 116 K/UL — LOW (ref 150–400)
PO2 BLDA: 50 MMHG — CRITICAL LOW (ref 74–108)
POTASSIUM SERPL-MCNC: 3 MMOL/L — LOW (ref 3.5–5.3)
POTASSIUM SERPL-MCNC: 3.8 MMOL/L — SIGNIFICANT CHANGE UP (ref 3.5–5.3)
POTASSIUM SERPL-SCNC: 3 MMOL/L — LOW (ref 3.5–5.3)
POTASSIUM SERPL-SCNC: 3.8 MMOL/L — SIGNIFICANT CHANGE UP (ref 3.5–5.3)
PROT SERPL-MCNC: 6.5 G/DL — SIGNIFICANT CHANGE UP (ref 6–8.3)
PROTHROM AB SERPL-ACNC: 13.2 SEC — SIGNIFICANT CHANGE UP (ref 10.6–13.6)
RBC # BLD: 4.28 M/UL — SIGNIFICANT CHANGE UP (ref 4.2–5.8)
RBC # FLD: 15.7 % — HIGH (ref 10.3–14.5)
SAO2 % BLDA: 83 % — LOW (ref 92–96)
SODIUM SERPL-SCNC: 134 MMOL/L — LOW (ref 135–145)
SODIUM SERPL-SCNC: 134 MMOL/L — LOW (ref 135–145)
SPECIMEN SOURCE: SIGNIFICANT CHANGE UP
WBC # BLD: 8.42 K/UL — SIGNIFICANT CHANGE UP (ref 3.8–10.5)
WBC # FLD AUTO: 8.42 K/UL — SIGNIFICANT CHANGE UP (ref 3.8–10.5)

## 2020-09-11 PROCEDURE — 99233 SBSQ HOSP IP/OBS HIGH 50: CPT

## 2020-09-11 PROCEDURE — 99223 1ST HOSP IP/OBS HIGH 75: CPT | Mod: GC

## 2020-09-11 PROCEDURE — 99232 SBSQ HOSP IP/OBS MODERATE 35: CPT

## 2020-09-11 PROCEDURE — 99024 POSTOP FOLLOW-UP VISIT: CPT

## 2020-09-11 PROCEDURE — 99233 SBSQ HOSP IP/OBS HIGH 50: CPT | Mod: GC,25

## 2020-09-11 RX ORDER — METOPROLOL TARTRATE 50 MG
25 TABLET ORAL
Refills: 0 | Status: DISCONTINUED | OUTPATIENT
Start: 2020-09-11 | End: 2020-09-13

## 2020-09-11 RX ORDER — FUROSEMIDE 40 MG
80 TABLET ORAL DAILY
Refills: 0 | Status: DISCONTINUED | OUTPATIENT
Start: 2020-09-11 | End: 2020-09-11

## 2020-09-11 RX ORDER — INSULIN LISPRO 100/ML
16 VIAL (ML) SUBCUTANEOUS
Refills: 0 | Status: DISCONTINUED | OUTPATIENT
Start: 2020-09-11 | End: 2020-09-12

## 2020-09-11 RX ORDER — FUROSEMIDE 40 MG
80 TABLET ORAL DAILY
Refills: 0 | Status: DISCONTINUED | OUTPATIENT
Start: 2020-09-11 | End: 2020-09-18

## 2020-09-11 RX ORDER — POTASSIUM CHLORIDE 20 MEQ
10 PACKET (EA) ORAL
Refills: 0 | Status: COMPLETED | OUTPATIENT
Start: 2020-09-11 | End: 2020-09-11

## 2020-09-11 RX ORDER — INSULIN LISPRO 100/ML
10 VIAL (ML) SUBCUTANEOUS ONCE
Refills: 0 | Status: COMPLETED | OUTPATIENT
Start: 2020-09-11 | End: 2020-09-11

## 2020-09-11 RX ORDER — POTASSIUM CHLORIDE 20 MEQ
40 PACKET (EA) ORAL ONCE
Refills: 0 | Status: COMPLETED | OUTPATIENT
Start: 2020-09-11 | End: 2020-09-11

## 2020-09-11 RX ORDER — INSULIN GLARGINE 100 [IU]/ML
60 INJECTION, SOLUTION SUBCUTANEOUS AT BEDTIME
Refills: 0 | Status: DISCONTINUED | OUTPATIENT
Start: 2020-09-11 | End: 2020-09-12

## 2020-09-11 RX ADMIN — CLOPIDOGREL BISULFATE 75 MILLIGRAM(S): 75 TABLET, FILM COATED ORAL at 11:08

## 2020-09-11 RX ADMIN — Medication 10 UNIT(S): at 13:19

## 2020-09-11 RX ADMIN — Medication 80 MILLIGRAM(S): at 13:13

## 2020-09-11 RX ADMIN — Medication 80 MILLIGRAM(S): at 03:05

## 2020-09-11 RX ADMIN — CEFTRIAXONE 100 MILLIGRAM(S): 500 INJECTION, POWDER, FOR SOLUTION INTRAMUSCULAR; INTRAVENOUS at 17:42

## 2020-09-11 RX ADMIN — Medication 10 UNIT(S): at 11:08

## 2020-09-11 RX ADMIN — ATORVASTATIN CALCIUM 80 MILLIGRAM(S): 80 TABLET, FILM COATED ORAL at 21:03

## 2020-09-11 RX ADMIN — Medication 25 MILLIGRAM(S): at 11:09

## 2020-09-11 RX ADMIN — Medication 81 MILLIGRAM(S): at 11:08

## 2020-09-11 RX ADMIN — INSULIN GLARGINE 60 UNIT(S): 100 INJECTION, SOLUTION SUBCUTANEOUS at 21:47

## 2020-09-11 RX ADMIN — Medication 100 MILLIEQUIVALENT(S): at 21:51

## 2020-09-11 RX ADMIN — Medication 100 MILLIEQUIVALENT(S): at 20:00

## 2020-09-11 RX ADMIN — TAMSULOSIN HYDROCHLORIDE 0.4 MILLIGRAM(S): 0.4 CAPSULE ORAL at 21:03

## 2020-09-11 RX ADMIN — Medication 4: at 11:07

## 2020-09-11 RX ADMIN — LORATADINE 10 MILLIGRAM(S): 10 TABLET ORAL at 11:09

## 2020-09-11 RX ADMIN — Medication 25 MILLIGRAM(S): at 21:03

## 2020-09-11 RX ADMIN — Medication 6: at 17:28

## 2020-09-11 RX ADMIN — Medication 40 MILLIEQUIVALENT(S): at 20:01

## 2020-09-11 RX ADMIN — Medication 100 MILLIEQUIVALENT(S): at 21:00

## 2020-09-11 RX ADMIN — Medication 16 UNIT(S): at 17:27

## 2020-09-11 NOTE — DIETITIAN INITIAL EVALUATION ADULT. - OTHER INFO
Pt seen for: MICU Length Of Stay   Adm dx: sepsis    GI issues: none  Last BM: 9/10    Food allergies/Intolerances: NKFA   Vit/supplement PTA: none    Diet PTA: pt limits Na intake, buys low Na canned foods. Doesn't limit CHO intake. 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     Subjective/Objective information: pt reports good appetite PTA, has gained 40 lb during Covid. Checks BG 3 times/day, range in the 200's, reports last A1c 11-12. A1c this adm 11.2 indicating poor DM control.    Education: Type 2 DM Nutrition Therapy handout provided and reviewed, discussed importance of lowering A1c and encouraged weight loss

## 2020-09-11 NOTE — PROGRESS NOTE ADULT - ASSESSMENT
Assessment  DMT2: 59y Male with DM T2 with hyperglycemia, A1C 11.2%, was on basal bolus insulin at home, now on basal bolus insulin, Humalog was held 2/2 NPO status, patient received basal dose last night and standing-dose Humalog this afternoon, blood sugars running high and not at target postprandially, no hypoglycemic episodes, he is postop and now starting to eat.  Septic Shock: Hydronephrosis, On IV ABx, stable, monitored.  Obesity: No strict exercise routines, not on any weight loss program, neither on low calorie diet.          Cortney Flanagan MD  Cell: [phone]  Office: [phone] Assessment  DMT2: 59y Male with DM T2 with hyperglycemia, A1C 11.2%, was on basal bolus insulin at home,  now on basal bolus insulin, Humalog was held 2/2 NPO status, patient received basal dose last night and standing-dose Humalog this afternoon, blood sugars running high and not at target postprandially, no hypoglycemic episodes, he is postop and now starting to eat.  Septic Shock: Hydronephrosis, On IV ABx, stable, monitored.  Obesity: No strict exercise routines, not on any weight loss program, neither on low calorie diet.          Cortney Flanagan MD  Cell: [phone]  Office: [phone]

## 2020-09-11 NOTE — CONSULT NOTE ADULT - ASSESSMENT
60 yo man with HTN, T2DM, CAD/MI s/p stents (most recent 2/2018), HFrEF (10-15%) s/p ICD, COPD, HTN, GERD, BPH admitted 9/9/2020 for sepsis 2/2 gram+ Strep Gallolyticus bacteremia s/p AICD 9/10/2020 for source. GI consulted for colonoscopy.    Impression:  # Strep Gallolyticus bacteremia: Associated with possible colon cancer. Mildly anemia but no weight loss or change in stool caliber. No prior colonoscopy  # CAD  # HFrEF s/p AICD removal  # Hypertension  # COPD    Recommendation:    Gareth Esteban  [phone]  [phone]  Please call/page on call fellow on weekends and weekdays after 5pm 60 yo man with HTN, T2DM, CAD/MI s/p stents (most recent 2/2018), HFrEF (10-15%) s/p ICD, COPD, HTN, GERD, BPH admitted 9/9/2020 for sepsis 2/2 gram+ Strep Gallolyticus bacteremia s/p AICD 9/10/2020 for source. GI consulted for colonoscopy.    Impression:  # Strep Gallolyticus bacteremia: Associated with possible colon cancer. Mildly anemia but no weight loss or change in stool caliber. No prior colonoscopy  # CAD: on DAPT, last stent in 2019.   # HFrEF s/p AICD removal  # Hypertension  # COPD    Recommendation:  - please have cardiology clear patient for anesthesia and colonoscopy  - if cleared, will need to hold Plavix for 5 days prior to procedures (earliest will be Wednesday next week)  - if patient cleared but does not want to stay inpatient for colonoscopy, it is reasonable to have close outpatient follow up for colonoscopy after Plavix held for 5 days  - if patient deemed too high risk at this time for colonoscopy, can perform virtual CT colonography (Radiology order)  - rest of care per primary team    Gareth Esteban  [phone]  Please call/page on call fellow on weekends and weekdays after 5pm 58 yo man with HTN, T2DM, CAD/MI s/p stents (most recent 2/2018), HFrEF (10-15%) s/p ICD, COPD, HTN, GERD, BPH admitted 9/9/2020 for sepsis 2/2 gram+ Strep Gallolyticus bacteremia s/p AICD 9/10/2020 for source. GI consulted for colonoscopy.    Impression:  # Strep Gallolyticus bacteremia: Associated with possible colon cancer. Mildly anemia but no weight loss or change in stool caliber. No prior colonoscopy  # CAD: on DAPT, last stent in 2019.   # HFrEF s/p AICD removal  # Hypertension  # COPD    Recommendation:  - please have cardiology clear patient for anesthesia and colonoscopy, and if Plavix can be held for 5 days for procedure  - if cleared, will need to hold Plavix for 5 days prior to procedures (earliest will be Wednesday next week)  - if patient cleared but does not want to stay inpatient for colonoscopy, it is reasonable to have close outpatient follow up for colonoscopy after Plavix held for 5 days  - if patient deemed too high risk at this time for colonoscopy, can perform virtual CT colonography (Radiology order)  - Also check hepatitis B and C serologies  - rest of care per primary team    Gareth Esteban  [phone]  Please call/page on call fellow on weekends and weekdays after 5pm

## 2020-09-11 NOTE — CONSULT NOTE ADULT - PROVIDER SPECIALTY LIST ADULT
Electrophysiology
Gastroenterology
Infectious Disease
Nephrology
Nephrology
Urology
Cardiology
Endocrinology

## 2020-09-11 NOTE — CONSULT NOTE ADULT - ATTENDING COMMENTS
Patient seen and examined. Agree with above. Discussed with patient that Strep gallolyticus is associated with GI tract malignancies, and thus colon cancer screening is recommended. If patient is cleared from cardiac standpoint and can hold Plavix, would hold Plavix x 5 days and plan for colonoscopy next week. If he is of too high risk for colonoscopy, can also consider CT virtual colonography.
59M PMH T2DM(a1c=11.2%), HTN, CAD/MI s/p stents (most recent 2/2018), HFrEF (10-15%), ICD, ischemic cardiomyopathy, COPD, HTN, GERD pw fever, n/v, back pain x 1 day. Pt states that he was in his baseline state of health when he began to experience back pain while grocery shopping yesterday. He describes sudden onset left lower back pain, palliated by sitting upright, of sharp quality, without radiation, of 7/10 severity, which completely resolved when presenting to ED.     VS: febrile 102.9, HR , BP 63/40 to 105/62, RR 17-25, SpO2%  on RA  Labs: significant for WBC 10.28, thrombocytopenia 146, elevated BUN/SCr 32/1.88, hyperglycemic 247, elevated proBNP 1158, lactate wnl  Micro: COVID-19 negative, RVP negative, UA 0 WBC and negative for bacteria. GI PCR negative. BCx: Strep by PCR.  CT: No pneumonia. Emphysema. No bowel or obstruction. Hepatomegaly and diffuse hepatic steatosis. Splenomegaly. Atrophic right kidney, with severe hydronephrosis. A 0.7 cm soft tissue density is noted in the interpolar region of the right kidney and is incompletely characterized. Ultrasound may be helpful for further evaluation.  Pt admitted to MICU for management of hypotension requiring pressor support. While in ICU experienced emesis and diarrhea (without melena/hematochezia) x 1 with SOB.     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  Pt looks well, only complaint is feeling tired. No more back pain.  He denies any recent dental work. No pain right now.  He c/o ICD site "muscle pain" on and off since 1 month ago. Today there is no pain when pressed on the ICD.  Pt's daughter  has diarrhea over the weekend. Pt with diarrhea yesterday and once today    #Streptococcus detected in the blood by PCR:  - Source unknown. GI? vs contaminant vs dental(grossly exam teeth looks clean)  - 4/4 positive cultures, ? two separate sticks. ? seeded AICD  -suggest DONNIE  - Follow up final BCx result and the repeat BCx  - For now, keep abx on, will do vancomycin by level and ceftriaxone 2g q24h IV  - Dental consult to r/o dental origin of infection    Unclear what left flank pain was? hydro is on the right? ? related to the diarrhea? to the splenomegaly?  why is there splenomegaly ?
Patient seen and exam today at bedside. Chart, labs, vitals, radiology reviewed. Above H&P reviewed and edited where appropriate.  Agree with history and physical exam. Agree with assessment and plan.

## 2020-09-11 NOTE — PROGRESS NOTE ADULT - SUBJECTIVE AND OBJECTIVE BOX
HPI:  Patient seen and examined in MICU.  POD 1 status post ICD explant.  Patient hemodynamically stable and off pressor support.    Review Of Systems:           Respiratory: No shortness of breath, cough, or wheezing  Cardiovascular: No chest pain or palpitations  10 point review of systems is otherwise negative except as mentioned above        Medications:  acetaminophen   Tablet .. 650 milliGRAM(s) Oral every 6 hours PRN  ALBUTerol    90 MICROgram(s) HFA Inhaler 2 Puff(s) Inhalation every 12 hours PRN  aspirin  chewable 81 milliGRAM(s) Oral daily  atorvastatin 80 milliGRAM(s) Oral at bedtime  cefTRIAXone   IVPB 2000 milliGRAM(s) IV Intermittent every 24 hours  chlorhexidine 4% Liquid 1 Application(s) Topical <User Schedule>  clopidogrel Tablet 75 milliGRAM(s) Oral daily  dextrose 40% Gel 15 Gram(s) Oral once PRN  dextrose 5%. 1000 milliLiter(s) IV Continuous <Continuous>  dextrose 50% Injectable 12.5 Gram(s) IV Push once  dextrose 50% Injectable 25 Gram(s) IV Push once  dextrose 50% Injectable 25 Gram(s) IV Push once  furosemide    Tablet 80 milliGRAM(s) Oral daily  glucagon  Injectable 1 milliGRAM(s) IntraMuscular once PRN  influenza   Vaccine 0.5 milliLiter(s) IntraMuscular once  insulin glargine Injectable (LANTUS) 60 Unit(s) SubCutaneous at bedtime  insulin lispro (HumaLOG) corrective regimen sliding scale   SubCutaneous three times a day before meals  insulin lispro (HumaLOG) corrective regimen sliding scale   SubCutaneous at bedtime  insulin lispro Injectable (HumaLOG) 16 Unit(s) SubCutaneous three times a day before meals  loratadine 10 milliGRAM(s) Oral daily  metoprolol tartrate 25 milliGRAM(s) Oral two times a day  ondansetron Injectable 4 milliGRAM(s) IV Push once  pantoprazole    Tablet 40 milliGRAM(s) Oral before breakfast  tamsulosin 0.4 milliGRAM(s) Oral at bedtime    PAST MEDICAL & SURGICAL HISTORY:  H/O gastroesophageal reflux (GERD)  History of COPD  History of ischemic cardiomyopathy  Heart failure with reduced ejection fraction  Hypertension  AICD (automatic cardioverter/defibrillator) present  Diabetes  Stented coronary artery  H/O vasectomy: 20 yrs ago (2000)    Vitals:  T(C): 37.3 (09-11-20 @ 15:00), Max: 37.8 (09-10-20 @ 20:00)  HR: 108 (09-11-20 @ 18:00) (100 - 116)  BP: 122/74 (09-11-20 @ 18:00) (91/51 - 122/74)  BP(mean): 93 (09-11-20 @ 18:00) (65 - 93)  RR: 27 (09-11-20 @ 18:00) (17 - 32)  SpO2: 90% (09-11-20 @ 18:00) (84% - 96%)  Wt(kg): --  Daily Height in cm: 195.58 (11 Sep 2020 12:35)    Daily   I&O's Summary    10 Sep 2020 07:01  -  11 Sep 2020 07:00  --------------------------------------------------------  IN: 0 mL / OUT: 2285 mL / NET: -2285 mL    11 Sep 2020 07:01  -  11 Sep 2020 18:15  --------------------------------------------------------  IN: 970 mL / OUT: 1500 mL / NET: -530 mL        Physical Exam:  Appearance: Normal, well groomed, NAD  Eyes: PERRLA, EOMI, pink conjunctiva, no scleral icterus   HENT: Normal oral mucosa  Cardiovascular: RRR, S1, S2, no murmur, rub, or gallop; +edema; +JVD  Procedural access site: clean, dry, intact without hematoma  Respiratory: diminished breath sounds throughout  Gastrointestinal: Soft, non-tender, non-distended, BS+  Musculoskeletal: No clubbing or joint deformity   Neurologic: No focal weakness  Lymphatic: No lymphadenopathy  Psychiatry: AAOx3 with appropriate mood and affect  Skin: No rashes, ecchymoses, or cyanosis; +tattoos                           10.8   8.42  )-----------( 116      ( 11 Sep 2020 00:27 )             34.7     09-11    134<L>  |  98  |  39<H>  ----------------------------<  228<H>  3.8   |  23  |  1.78<H>    Ca    8.9      11 Sep 2020 00:27  Phos  2.5     09-11  Mg     2.4     09-11    TPro  6.5  /  Alb  3.4  /  TBili  0.6  /  DBili  x   /  AST  27  /  ALT  34  /  AlkPhos  58  09-11    PT/INR - ( 11 Sep 2020 00:27 )   PT: 13.2 sec;   INR: 1.12 ratio         PTT - ( 11 Sep 2020 00:27 )  PTT:27.3 sec      Serum Pro-Brain Natriuretic Peptide: 1158 pg/mL (09-08 @ 20:42)        Cardiovascular Diagnostic Testing:  ECG: sinus tach, 100 bpm, normal axis, poor R-wave progression    Echo: < from: TTE with Doppler (w/Cont) (02.08.18 @ 06:06) >  ------------------------------------------------------------------------  Dimensions:    Normal Values:  LA:     4.0    2.0 - 4.0 cm  Ao:     3.5    2.0 - 3.8 cm  SEPTUM: 1.0    0.6 - 1.2 cm  PWT:    1.2    0.6 - 1.1 cm  LVIDd:  6.5    3.0 - 5.6 cm  LVIDs:  5.3    1.8 - 4.0 cm  Derived variables:  LVMI: 132 g/m2  RWT: 0.36  EF (Visual Estimate): 20-25 %  ------------------------------------------------------------------------  Observations:  Mitral Valve: Grossly normal mitral valve. Minimal mitral  regurgitation.  Aortic Valve/Aorta: Trileaflet aortic valve. Mean  transaortic valve gradient equals 2 mm Hg, aortic valve  velocity time integral equals 20 cm. No aortic valve  regurgitation seen. Mean gradient is equal to 1 mm Hg, LVOT  velocity time integral equals 11 cm.  Normal aortic root size. (Ao: 3.5 cm at the sinuses of  Valsalva).  Left Atrium: Left atrium not well visualized.  Left Ventricle: Endocardial visualization enhanced with  intravenous injection ofecho contrast (Definity).  Severe  segmental left ventricular systolic dysfunction. The  anteroseptal, inferoseptal, anterior walls and all apical  segments are akinetic.  No left ventricular thrombus.  Swirling of contrast suggests low flow.  Moderate left  ventricular enlargement. Mild diastolic dysfunction (Stage  I).  Right Heart: Normal right atrium. The right ventricle is  not well visualized; grossly normal right ventricular  systolic function. TAPSE > 2.0 cm (normal).  Tricuspid  valve not well visualized. Pulmonic valve not well  visualized, probably normal.  Pericardium/Pleura: Trace pericardial effusion.  Hemodynamic: Estimated right atrial pressure is 8 mm Hg.  Inadequate tricuspid regurgitation Doppler envelope  precludes estimation of RVSP.  ------------------------------------------------------------------------  Conclusions:  1. Endocardial visualization enhanced with intravenous  injection of echo contrast (Definity).  Severe segmental  left ventricular systolic dysfunction. The anteroseptal,  inferoseptal, anterior walls and all apical segments are  akinetic.  No left ventricular thrombus. Swirling of  contrast suggests low flow.  2. Mild diastolic dysfunction (Stage I).  3. Trace pericardial effusion.  *** No previous Echo exam. Technically difficult (portable)  study.  ------------------------------------------------------------------------  Revised on 2/8/2018 - 9:14 AM by Major Remy M.D.  ------------------------------------------------------------------------    < end of copied text >    Cath: < from: Cardiac Cath Lab - Adult (02.05.18 @ 16:05) >  VENTRICLES: Analysis of regional contractile function demonstrated severe  anterolateral hypokinesis, apical dyskinesis, and diaphragmatic  dyskinesis. EF estimated was 25 %.  CORONARY VESSELS: The coronary circulation is right dominant.  LM:   --  LM: Angiography showed minor luminal irregularities with no flow  limiting lesions.  LAD:   --  Ostial LAD: There was a 100 % stenosis.  CX:   --  Mid circumflex: There was a 30 % stenosis.  RI:   --  Ramus intermedius: There was a 100 % stenosis.  RCA:   --  RCA: Angiography showed minor luminal irregularities with no  flow limiting lesions.  COMPLICATIONS: There were no complications. No complications occurred  during the cath lab visit.  DIAGNOSTIC RECOMMENDATIONS: ASA and Plavix for 1 year.  INTERVENTIONAL RECOMMENDATIONS: ASA and Plavix for 1 year.  Prepared and signed by  Stefan Mireles M.D.  Signed 02/05/2018 17:53:51    < end of copied text >    Interpretation of Telemetry: NSR

## 2020-09-11 NOTE — PROGRESS NOTE ADULT - ASSESSMENT
59M PMH T2DM, HTN, CAD/MI s/p stents (most recent 2/2018), HFrEF (10-15%), ICD,  ischemic cardiomyopathy, COPD, HTN, GERD pw fever, n/v, back pain IN ED was found to have T 102.9,, BP 63/40  elevated BUN/SCr 32/1.88, hyperglycemic 247, elevated proBNP 1158, lactate Started on levophed. hypotension requiring pressor support.  now  s/p ICD extraction     1- ckd III  2- right hydro and atrophy kidney   3- fevers  4- bacteremia   5- hypotension   6- chf       cr baseline   TOV   renal sono to evaluate right kidney lesion seen on ct scan   rocephin 2 g iv qd  lasix 80 mg po daily   midodrine 10 mg tid

## 2020-09-11 NOTE — DIETITIAN INITIAL EVALUATION ADULT. - PERTINENT MEDS FT
MEDICATIONS  (STANDING):  aspirin  chewable 81 milliGRAM(s) Oral daily  atorvastatin 80 milliGRAM(s) Oral at bedtime  cefTRIAXone   IVPB 2000 milliGRAM(s) IV Intermittent every 24 hours  chlorhexidine 4% Liquid 1 Application(s) Topical <User Schedule>  clopidogrel Tablet 75 milliGRAM(s) Oral daily  dextrose 5%. 1000 milliLiter(s) (50 mL/Hr) IV Continuous <Continuous>  dextrose 50% Injectable 12.5 Gram(s) IV Push once  dextrose 50% Injectable 25 Gram(s) IV Push once  dextrose 50% Injectable 25 Gram(s) IV Push once  furosemide    Tablet 80 milliGRAM(s) Oral daily  influenza   Vaccine 0.5 milliLiter(s) IntraMuscular once  insulin glargine Injectable (LANTUS) 50 Unit(s) SubCutaneous at bedtime  insulin lispro (HumaLOG) corrective regimen sliding scale   SubCutaneous three times a day before meals  insulin lispro (HumaLOG) corrective regimen sliding scale   SubCutaneous at bedtime  insulin lispro Injectable (HumaLOG) 10 Unit(s) SubCutaneous three times a day before meals  loratadine 10 milliGRAM(s) Oral daily  metoprolol tartrate 25 milliGRAM(s) Oral two times a day  ondansetron Injectable 4 milliGRAM(s) IV Push once  pantoprazole    Tablet 40 milliGRAM(s) Oral before breakfast  tamsulosin 0.4 milliGRAM(s) Oral at bedtime

## 2020-09-11 NOTE — CHART NOTE - NSCHARTNOTEFT_GEN_A_CORE
MAR Accept Note  Transfer to:    Accepting Attending Physician:    Assigned Room:      Patient seen and examined.   Labs and data reviewed.   No findings precluding transfer of service.       HPI/MICU COURSE:   Please refer to MICU transfer note for full details. Briefly, this is a 60 yo man with HTN, T2DM, CAD/MI s/p stents (most recent 2/2018), HFrEF (10-15%) s/p ICD, COPD, HTN, GERD, BPH admitted 9/9/2020 for sepsis 2/2 gram+ Strep Gallolyticus bacteremia (on vancomycin and ceftriaxone 9/10 -) s/p AICD 9/10/2020 for source control    FOR FOLLOW-UP:  [ ] f/u GI recc regarding Strep Gallolyticus bacteremia   [ ] monitor on telemetry, s/p AICD removal   [ ] f/u final sensitivity of the streptococcus, f/u ID recs   [ ] trend Cr    Mark Hellerman PGY3  MAR [id number]

## 2020-09-11 NOTE — PROGRESS NOTE ADULT - ASSESSMENT
59 year old morbidly obese male PMH COPD (not on home O2) T2DM, HTN, CAD/MI s/p stents (most recent 2/2018), ICM w/ HFrEF (10-15%) s/p primary prevention STJ VVI ICD, HTN, GERD, BPH, who was admitted for sepsis 2/2 gram positive bacteremia.  Admitted to MICU for hypotension, requiring pressors. now s/p ICD extraction 9/10    STJ ICD Fortify VR 1231-40Q ICD: implanted 1/282012   Durata 7121Q/65cm: Implanted 1/28/2012     1. ICM EF 10-15%  2. Streptococcus Bacteremia     s/p single chamber ICD 9/10/20 extraction   post procedure teaching done with patient   blood cultures 9/10 negative , continue to follow   continue antibiotics per ID  screening by Yozio for SC -ICD implant   discussed procedure with patient risks and benefits procedure and is agreeable     addendum  1600  SC ICD screening done by representative   patient screened in for SC ICD   SC ICD likely implant Monday,  NPO after midnight Sunday for Monday    [id number]

## 2020-09-11 NOTE — PROGRESS NOTE ADULT - ASSESSMENT
ASSESSMENT/RECOMMENDATIONS:  59M PMH T2DM(a1c=11.2%), HTN, CAD/MI s/p stents (most recent 2/2018), HFrEF (10-15%), ICD, ischemic cardiomyopathy, COPD, HTN, GERD pw fever, n/v, back pain x 1 day. Pt states that he was in his baseline state of health when he began to experience back pain while grocery shopping yesterday. He describes sudden onset left lower back pain, palliated by sitting upright, of sharp quality, without radiation, of 7/10 severity, which completely resolved when presenting to ED.     VS: febrile 102.9, HR , BP 63/40 to 105/62, RR 17-25, SpO2%  on RA  Labs: significant for WBC 10.28, thrombocytopenia 146, elevated BUN/SCr 32/1.88, hyperglycemic 247, elevated proBNP 1158, lactate wnl  Micro: COVID-19 negative, RVP negative, UA 0 WBC and negative for bacteria. GI PCR negative. BCx: Strep by PCR.  CT: No pneumonia. Emphysema. No bowel or obstruction. Hepatomegaly and diffuse hepatic steatosis. Splenomegaly. Atrophic right kidney, with severe hydronephrosis. A 0.7 cm soft tissue density is noted in the interpolar region of the right kidney and is incompletely characterized. Ultrasound may be helpful for further evaluation.  Pt admitted to MICU for management of hypotension requiring pressor support. While in ICU experienced emesis and diarrhea (without melena/hematochezia) x 1 with SOB.     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  Pt looks well, only complaint is feeling tired. No more  pain.  He denies any recent dental work. No pain right now.  He c/o ICD site "muscle pain" on and off since 1 month ago. Today there is no pain when pressed on the ICD.  Pt's daughter also has diarrhea over the weekend.  pt with Streptococcus gallolyticus ( strep bovis)    final sensitviites of strep as above  continue Rocephin  GI evaluation- r/o malignancy   will discuss with EPS about the timing of a new AICD  Await follow up blood cultures so far NGTD    - ELEvated creatinine   team will slowly restart beta-blocker and Entresto as hemodynamics and renal function permit once patient is off midodrine.  Cardiology is following closely

## 2020-09-11 NOTE — PROGRESS NOTE ADULT - SUBJECTIVE AND OBJECTIVE BOX
INTERVAL HPI/OVERNIGHT EVENTS:   s/p AICD extraction, patient tolerated procedure very well.   Patient sitting in chair, no complaints         T(C): 37.3 (09-11-20 @ 11:00), Max: 37.3 (09-11-20 @ 11:00)  HR: 105 (09-11-20 @ 14:00) (100 - 111)  BP: 98/57 (09-11-20 @ 14:00) (91/51 - 117/64)  RR: 24 (09-11-20 @ 14:00) (17 - 32)  SpO2: 93% (09-11-20 @ 14:00) (89% - 96%)      MEDICATIONS  (STANDING):  aspirin  chewable 81 milliGRAM(s) Oral daily  atorvastatin 80 milliGRAM(s) Oral at bedtime  cefTRIAXone   IVPB 2000 milliGRAM(s) IV Intermittent every 24 hours  chlorhexidine 4% Liquid 1 Application(s) Topical <User Schedule>  clopidogrel Tablet 75 milliGRAM(s) Oral daily  dextrose 5%. 1000 milliLiter(s) (50 mL/Hr) IV Continuous <Continuous>  dextrose 50% Injectable 12.5 Gram(s) IV Push once  dextrose 50% Injectable 25 Gram(s) IV Push once  dextrose 50% Injectable 25 Gram(s) IV Push once  furosemide    Tablet 80 milliGRAM(s) Oral daily  influenza   Vaccine 0.5 milliLiter(s) IntraMuscular once  insulin glargine Injectable (LANTUS) 50 Unit(s) SubCutaneous at bedtime  insulin lispro (HumaLOG) corrective regimen sliding scale   SubCutaneous three times a day before meals  insulin lispro (HumaLOG) corrective regimen sliding scale   SubCutaneous at bedtime  insulin lispro Injectable (HumaLOG) 10 Unit(s) SubCutaneous three times a day before meals  loratadine 10 milliGRAM(s) Oral daily  metoprolol tartrate 25 milliGRAM(s) Oral two times a day  ondansetron Injectable 4 milliGRAM(s) IV Push once  pantoprazole    Tablet 40 milliGRAM(s) Oral before breakfast  tamsulosin 0.4 milliGRAM(s) Oral at bedtime    MEDICATIONS  (PRN):  acetaminophen   Tablet .. 650 milliGRAM(s) Oral every 6 hours PRN Temp greater or equal to 38C (100.4F), Mild Pain (1 - 3)  ALBUTerol    90 MICROgram(s) HFA Inhaler 2 Puff(s) Inhalation every 12 hours PRN Shortness of Breath and/or Wheezing  dextrose 40% Gel 15 Gram(s) Oral once PRN Blood Glucose LESS THAN 70 milliGRAM(s)/deciliter  glucagon  Injectable 1 milliGRAM(s) IntraMuscular once PRN Glucose LESS THAN 70 milligrams/deciliter    OUT:    Indwelling Catheter - Urethral: 2305 mL    Voided: 50 mL  Total OUT: 2355 mL    Total NET: -1105 mL        PHYSICAL EXAM:    Constitutional: NAD. well-developed; well-groomed; well-nourished;  HEENT: AT/NC, PERRLA; EOMI, MMM, no oropharyngeal lesions, no erythema, no exudates,   Respiratory: CTAB. equal aeration bilaterally. no wheezing, no crackes, no rhonchi.   Cardiovascular: RRR, no M/R/G. no JVD  Gastrointestinal: soft; NT/ND, obese, +BS, no rebounding tenderness / guarding / HSM / mass / ascites.  Extremities: no clubbing; no cyanosis; 1+ LE edema to shins, non-tender to palpation, DP and Radial pulses intact.  Skin: warm and dry; color normal: no rash: no ulcers  Neurological: A&Ox 3; responds to pain; responds to verbal commands; CN nerves grossly intact.                                       10.8   8.42  )-----------( 116      ( 11 Sep 2020 00:27 )             34.7           LIVER FUNCTIONS - ( 11 Sep 2020 00:27 )  Alb: 3.4 g/dL / Pro: 6.5 g/dL / ALK PHOS: 58 U/L / ALT: 34 U/L / AST: 27 U/L / GGT: x           PT/INR - ( 11 Sep 2020 00:27 )   PT: 13.2 sec;   INR: 1.12 ratio         PTT - ( 11 Sep 2020 00:27 )  PTT:27.3 sec  134|98|39<228  3.8|23|1.78  8.9,2.4,2.5  09-11 @ 00:27  MICROBIOLOGY:    GI PCR Panel, Stool (collected 09 Sep 2020 10:21)  Source: .Stool Feces sarina jose  Final Report (09 Sep 2020 12:58):    GI PCR Results: NOT detected    *******Please Note:*******    GI panel PCR evaluates for:    Campylobacter, Plesiomonas shigelloides, Salmonella,    Vibrio, Yersinia enterocolitica, Enteroaggregative    Escherichia coli (EAEC), Enteropathogenic E.coli (EPEC),    Enterotoxigenic E. coli (ETEC) lt/st, Shiga-like    toxin-producing E. coli (STEC) stx1/stx2,    Shigella/ Enteroinvasive E. coli (EIEC), Cryptosporidium,    Cyclospora cayetanensis, Entamoeba histolytica,    Giardia lamblia, Adenovirus F 40/41, Astrovirus,    Norovirus GI/GII, Rotavirus A, Sapovirus    Culture - Stool (09.09.20 @ 10:21)    Specimen Source: .Stool Feces  sarina jose    Culture Results:   No enteric pathogens to date: Final culture pending    Culture - Blood (09.09.20 @ 00:57)    Gram Stain:   Growth in aerobic and anaerobic bottles: Gram Positive Cocci in Pairs and  Chains    Specimen Source: .Blood Blood-Peripheral    Culture Results:   Growth in aerobic and anaerobic bottles: Streptococcus gallolyticus  See previous culture [id number]        IMAGING:      < from: CT Chest No Cont (09.09.20 @ 01:27) >  IMPRESSION:  No pneumonia.    Emphysema.    No bowel or obstruction.    Hepatomegaly and diffuse hepatic steatosis.    Splenomegaly.    Atrophic right kidney, with severe hydronephrosis. A 0.7 cm soft tissue density is noted in the interpolar region of the right kidney and is incompletely characterized. Ultrasound may be helpful for further evaluation.    < end of copied text >

## 2020-09-11 NOTE — PROGRESS NOTE ADULT - SUBJECTIVE AND OBJECTIVE BOX
[facility]   [phone]  RENAL FOLLOW UP NOTE  --------------------------------------------------------------------------------  Chief Complaint:    24 hour events/subjective:    seen earlier in MICU. states breathing is better. chills have improved.     PAST HISTORY  --------------------------------------------------------------------------------  No significant changes to PMH, PSH, FHx, SHx, unless otherwise noted    ALLERGIES & MEDICATIONS  --------------------------------------------------------------------------------  Allergies    No Known Allergies    Intolerances      Standing Inpatient Medications  aspirin  chewable 81 milliGRAM(s) Oral daily  atorvastatin 80 milliGRAM(s) Oral at bedtime  cefTRIAXone   IVPB 2000 milliGRAM(s) IV Intermittent every 24 hours  chlorhexidine 4% Liquid 1 Application(s) Topical <User Schedule>  clopidogrel Tablet 75 milliGRAM(s) Oral daily  dextrose 5%. 1000 milliLiter(s) IV Continuous <Continuous>  dextrose 50% Injectable 12.5 Gram(s) IV Push once  dextrose 50% Injectable 25 Gram(s) IV Push once  dextrose 50% Injectable 25 Gram(s) IV Push once  furosemide    Tablet 80 milliGRAM(s) Oral daily  influenza   Vaccine 0.5 milliLiter(s) IntraMuscular once  insulin glargine Injectable (LANTUS) 60 Unit(s) SubCutaneous at bedtime  insulin lispro (HumaLOG) corrective regimen sliding scale   SubCutaneous three times a day before meals  insulin lispro (HumaLOG) corrective regimen sliding scale   SubCutaneous at bedtime  insulin lispro Injectable (HumaLOG) 16 Unit(s) SubCutaneous three times a day before meals  loratadine 10 milliGRAM(s) Oral daily  metoprolol tartrate 25 milliGRAM(s) Oral two times a day  ondansetron Injectable 4 milliGRAM(s) IV Push once  pantoprazole    Tablet 40 milliGRAM(s) Oral before breakfast  tamsulosin 0.4 milliGRAM(s) Oral at bedtime    PRN Inpatient Medications  acetaminophen   Tablet .. 650 milliGRAM(s) Oral every 6 hours PRN  ALBUTerol    90 MICROgram(s) HFA Inhaler 2 Puff(s) Inhalation every 12 hours PRN  dextrose 40% Gel 15 Gram(s) Oral once PRN  glucagon  Injectable 1 milliGRAM(s) IntraMuscular once PRN      REVIEW OF SYSTEMS  --------------------------------------------------------------------------------    Gen: denies  fevers/chills,  CVS: denies chest pain/palpitations  Resp: denies SOB/Cough  GI: Denies N/V/Abd pain  : Denies dysuria     All other systems were reviewed and are negative, except as noted.    VITALS/PHYSICAL EXAM  --------------------------------------------------------------------------------  T(C): 37.3 (09-11-20 @ 15:00), Max: 37.8 (09-10-20 @ 20:00)  HR: 105 (09-11-20 @ 17:00) (100 - 116)  BP: 121/70 (09-11-20 @ 17:00) (91/51 - 121/70)  RR: 26 (09-11-20 @ 17:00) (17 - 32)  SpO2: 92% (09-11-20 @ 17:00) (84% - 96%)  Wt(kg): --  Height (cm): 195.58 (09-11-20 @ 12:35)  Weight (kg): 129.5 (09-10-20 @ 07:24)  BMI (kg/m2): 33.9 (09-11-20 @ 12:35)  BSA (m2): 2.6 (09-11-20 @ 12:35)      09-10-20 @ 07:01  -  09-11-20 @ 07:00  --------------------------------------------------------  IN: 0 mL / OUT: 2285 mL / NET: -2285 mL    09-11-20 @ 07:01  -  09-11-20 @ 17:27  --------------------------------------------------------  IN: 800 mL / OUT: 1300 mL / NET: -500 mL      Physical Exam:  	    Gen: Non toxic comfortable appearing   	no jvd  	Pulm: decrease bs  no rales or ronchi or wheezing  	CV: RRR, S1S2; no rub  	Abd: +BS, soft, nontender/nondistended  	: No suprapubic tenderness  	UE: Warm, no cyanosis  no clubbing,  no edema  	LE: Warm, no cyanosis  no clubbing, no edema  	Neuro: alert and oriented. speech coherent       LABS/STUDIES  --------------------------------------------------------------------------------              10.8   8.42  >-----------<  116      [09-11-20 @ 00:27]              34.7     134  |  98  |  39  ----------------------------<  228      [09-11-20 @ 00:27]  3.8   |  23  |  1.78        Ca     8.9     [09-11-20 @ 00:27]      Mg     2.4     [09-11-20 @ 00:27]      Phos  2.5     [09-11-20 @ 00:27]    TPro  6.5  /  Alb  3.4  /  TBili  0.6  /  DBili  x   /  AST  27  /  ALT  34  /  AlkPhos  58  [09-11-20 @ 00:27]    PT/INR: PT 13.2 , INR 1.12       [09-11-20 @ 00:27]  PTT: 27.3       [09-11-20 @ 00:27]      Creatinine Trend:  SCr 1.78 [09-11 @ 00:27]  SCr 1.54 [09-10 @ 14:38]  SCr 1.80 [09-10 @ 01:20]  SCr 2.02 [09-09 @ 14:20]  SCr 2.07 [09-09 @ 04:36]              Urinalysis - [09-09-20 @ 07:23]      Color Light Yellow / Appearance Clear / SG 1.014 / pH 6.0      Gluc Trace / Ketone Negative  / Bili Negative / Urobili Negative       Blood Trace / Protein Trace / Leuk Est Negative / Nitrite Negative      RBC 2 / WBC 0 / Hyaline 4 / Gran  / Sq Epi  / Non Sq Epi 0 / Bacteria Negative      HbA1c 8.9      [12-16-19 @ 08:15]  TSH 1.74      [09-09-20 @ 07:25]

## 2020-09-11 NOTE — PROGRESS NOTE ADULT - SUBJECTIVE AND OBJECTIVE BOX
INTERVAL HPI/OVERNIGHT EVENTS: Patient has not eaten since procedure. Patient ate breakfast this morning and has been able to walk around. Denies any acute concerns. Reports coughing up some blood, but was reassured previously that it was likely due to his abrupt self-extubation.     MEDICATIONS  (STANDING):  aspirin  chewable 81 milliGRAM(s) Oral daily  atorvastatin 80 milliGRAM(s) Oral at bedtime  cefTRIAXone   IVPB 2000 milliGRAM(s) IV Intermittent every 24 hours  chlorhexidine 4% Liquid 1 Application(s) Topical <User Schedule>  clopidogrel Tablet 75 milliGRAM(s) Oral daily  dextrose 5%. 1000 milliLiter(s) (50 mL/Hr) IV Continuous <Continuous>  dextrose 50% Injectable 12.5 Gram(s) IV Push once  dextrose 50% Injectable 25 Gram(s) IV Push once  dextrose 50% Injectable 25 Gram(s) IV Push once  furosemide    Tablet 80 milliGRAM(s) Oral daily  influenza   Vaccine 0.5 milliLiter(s) IntraMuscular once  insulin glargine Injectable (LANTUS) 50 Unit(s) SubCutaneous at bedtime  insulin lispro (HumaLOG) corrective regimen sliding scale   SubCutaneous three times a day before meals  insulin lispro (HumaLOG) corrective regimen sliding scale   SubCutaneous at bedtime  insulin lispro Injectable (HumaLOG) 10 Unit(s) SubCutaneous three times a day before meals  loratadine 10 milliGRAM(s) Oral daily  metoprolol tartrate 25 milliGRAM(s) Oral two times a day  ondansetron Injectable 4 milliGRAM(s) IV Push once  pantoprazole    Tablet 40 milliGRAM(s) Oral before breakfast  tamsulosin 0.4 milliGRAM(s) Oral at bedtime    MEDICATIONS  (PRN):  acetaminophen   Tablet .. 650 milliGRAM(s) Oral every 6 hours PRN Temp greater or equal to 38C (100.4F), Mild Pain (1 - 3)  ALBUTerol    90 MICROgram(s) HFA Inhaler 2 Puff(s) Inhalation every 12 hours PRN Shortness of Breath and/or Wheezing  dextrose 40% Gel 15 Gram(s) Oral once PRN Blood Glucose LESS THAN 70 milliGRAM(s)/deciliter  glucagon  Injectable 1 milliGRAM(s) IntraMuscular once PRN Glucose LESS THAN 70 milligrams/deciliter        OBJECTIVE:  ICU Vital Signs Last 24 Hrs  T(C): 37.2 (11 Sep 2020 07:00), Max: 37.8 (10 Sep 2020 20:00)  T(F): 99 (11 Sep 2020 07:00), Max: 100 (10 Sep 2020 20:00)  HR: 102 (11 Sep 2020 09:00) (100 - 123)  BP: 113/66 (11 Sep 2020 09:00) (91/51 - 119/73)  BP(mean): 84 (11 Sep 2020 09:00) (65 - 90)  ABP: 107/58 (11 Sep 2020 00:00) (104/59 - 142/67)  ABP(mean): 73 (11 Sep 2020 00:00) (73 - 89)  RR: 20 (11 Sep 2020 09:00) (17 - 29)  SpO2: 91% (11 Sep 2020 10:00) (84% - 100%)    I&O's Detail    10 Sep 2020 07:01  -  11 Sep 2020 07:00  --------------------------------------------------------  IN:  Total IN: 0 mL    OUT:    Indwelling Catheter - Urethral: 2285 mL  Total OUT: 2285 mL    Total NET: -2285 mL      I&O's Detail    09 Sep 2020 07:01  -  10 Sep 2020 07:00  --------------------------------------------------------  IN:    IV PiggyBack: 750 mL    Oral Fluid: 500 mL  Total IN: 1250 mL    OUT:    Indwelling Catheter - Urethral: 2305 mL    Voided: 50 mL  Total OUT: 2355 mL    Total NET: -1105 mL    PHYSICAL EXAM:    Constitutional: NAD. comfortable in bed w/ back up  HEENT: AT/NC, PERRLA; EOMI, MMM, no oropharyngeal lesions, no erythema, no exudates,   Respiratory: CTAB. equal aeration bilaterally. no wheezing, no crackles, no rhonchi. Hollow expiratory sound on right. diffusely.   Cardiovascular: RRR, no M/R/G. no JVD  Gastrointestinal: soft; NT/ND, obese, +BS, no rebounding tenderness / guarding / HSM / mass / ascites.  Extremities: no clubbing; no cyanosis; 1+ LE edema to shins, non-tender to palpation, DP and Radial pulses intact.  Skin: warm and dry; color normal: no rash: no ulcers  Neurological: A&Ox 3; responds to pain; responds to verbal commands; CN nerves grossly intact.     Labs                          10.8   8.42  )-----------( 116      ( 11 Sep 2020 00:27 )             34.7     09-11    134<L>  |  98  |  39<H>  ----------------------------<  228<H>  3.8   |  23  |  1.78<H>    Ca    8.9      11 Sep 2020 00:27  Phos  2.5     09-11  Mg     2.4     09-11    TPro  6.5  /  Alb  3.4  /  TBili  0.6  /  DBili  x   /  AST  27  /  ALT  34  /  AlkPhos  58  09-11    LIVER FUNCTIONS - ( 11 Sep 2020 00:27 )  Alb: 3.4 g/dL / Pro: 6.5 g/dL / ALK PHOS: 58 U/L / ALT: 34 U/L / AST: 27 U/L / GGT: x           PT/INR - ( 11 Sep 2020 00:27 )   PT: 13.2 sec;   INR: 1.12 ratio         PTT - ( 11 Sep 2020 00:27 )  PTT:27.3 sec  CAPILLARY BLOOD GLUCOSE      POCT Blood Glucose.: 238 mg/dL (11 Sep 2020 11:04)  POCT Blood Glucose.: 233 mg/dL (11 Sep 2020 09:50)  POCT Blood Glucose.: 169 mg/dL (10 Sep 2020 21:21)  POCT Blood Glucose.: 171 mg/dL (10 Sep 2020 19:53)  POCT Blood Glucose.: 273 mg/dL (10 Sep 2020 17:18)  POCT Blood Glucose.: 331 mg/dL (10 Sep 2020 13:32)      ABG - ( 11 Sep 2020 00:21 )  pH, Arterial: 7.43  pH, Blood: x     /  pCO2: 42    /  pO2: 50    / HCO3: 28    / Base Excess: 3.4   /  SaO2: 83          EKG:   MICROBIOLOGY:    Followup cultures - no growth to date.     GI PCR Panel, Stool (collected 09 Sep 2020 10:21)  Source: .Stool Feces sarina jose  Final Report (09 Sep 2020 12:58):    GI PCR Results: NOT detected    *******Please Note:*******    GI panel PCR evaluates for:    Campylobacter, Plesiomonas shigelloides, Salmonella,    Vibrio, Yersinia enterocolitica, Enteroaggregative    Escherichia coli (EAEC), Enteropathogenic E.coli (EPEC),    Enterotoxigenic E. coli (ETEC) lt/st, Shiga-like    toxin-producing E. coli (STEC) stx1/stx2,    Shigella/ Enteroinvasive E. coli (EIEC), Cryptosporidium,    Cyclospora cayetanensis, Entamoeba histolytica,    Giardia lamblia, Adenovirus F 40/41, Astrovirus,    Norovirus GI/GII, Rotavirus A, Sapovirus    Culture - Stool (09.09.20 @ 10:21)    Specimen Source: .Stool Feces  sarina jose    Culture Results:   No enteric pathogens to date: Final culture pending    Culture - Blood (09.09.20 @ 00:57)    Gram Stain:   Growth in aerobic and anaerobic bottles: Gram Positive Cocci in Pairs and  Chains    Specimen Source: .Blood Blood-Peripheral    Culture Results:   Growth in aerobic and anaerobic bottles: Streptococcus gallolyticus  See previous culture [id number]      IMAGING:  < from: Xray Chest 1 View- PORTABLE-Urgent (09.10.20 @ 15:38) >  Impression: Adequate placement of central venous catheter.    < end of copied text >      < from: CT Chest No Cont (09.09.20 @ 01:27) >  IMPRESSION:  No pneumonia.    Emphysema.    No bowel or obstruction.    Hepatomegaly and diffuse hepatic steatosis.    Splenomegaly.    Atrophic right kidney, with severe hydronephrosis. A 0.7 cm soft tissue density is noted in the interpolar region of the right kidney and is incompletely characterized. Ultrasound may be helpful for further evaluation.    < end of copied text >

## 2020-09-11 NOTE — PROGRESS NOTE ADULT - ASSESSMENT
Assessment: 59M PMH T2DM, HTN, CAD/MI s/p stents (most recent 2/2018), ICM w/ HFrEF (10-15%) s/p AICD, COPD (not on home O2), HTN, GERD, BPH, who is admitted for sepsis 2/2 gram positive bacteremia. Admitted to MICU for hypotension yet did not require pressor.     # Hypotension secondary to Septic Shock  - c/w Midodrine 10 q 8 to ween pressor support  - resuming home metoprolol but holding entresto, furosemide, indapamide.   - Diuresing as needed    # HFrEF s/p AICD  - most recent echo from 12/2019: EF 10-15% sev global LV systolic dysfunction. eccentric LVH  - cardiology following   - resumed b-blocker but holding onto rest of HF meds.  - s/p AICD extraction in setting of Group D Strep Bacteremia    # CAD s/p stent  - c/w home DAPT (ASA/PLV) and high dose statin.      GI/NUTRITION  - GI PCR negative. Stool culture pending  - Continuing home pantoprazole po 40mg  - diabetic diet.  - Consider GI consult for colonoscopy given Strep bovis bacteremia.     Renal  - MANJIT improving - BUN/SCr 39/1.80 (1.4-1.8 during 12/2019 admission)  - Peguero in place, adequate urine output 2300/24hrs.   - Holding home diuretics and will diurese as needed as above  - Atrophied R kidney w/ chronic R hydronephrosis which was also partially imaged on CT Chest from 11/11/2019  - nephrology and urology consult - no need for urgent nephrostomy tube, can place later when patient is stable or if Cr continues to worsen.     #BPH  - continuing home tamsulosin 0.4mg  - Peguero as above    Hematologic  - Monitor CBC, currently stable    #DVT ppx  - SCDs for DVT ppx      # Strep Gallolyticus bacteremia  - S/p 1 dose vancomycin 1.5g. Trough 6.1.   - Appreciate ID recs for abx with culture update and vanc level.   - repeat BCx   - Stool Cx/GI PCR pending, no growth yet.     Hyperglycemia   - Pt w/ hx of IDDM on home Lantus 74u qhs and Humalog 50u pre-meal per pt  - Pt FS 300s with 38u ISS in addition to 50u Lantus, 10u premeal.   - Appreciate endo recs on increasing Basal bolus - 75u Lantus, 20u premeal.   - Will monitor FSS with moderate ISS    Ethics  - GOC discussed Patient wants full code.

## 2020-09-11 NOTE — PROGRESS NOTE ADULT - SUBJECTIVE AND OBJECTIVE BOX
Patient is a 59y old  Male who presents with a chief complaint of fever, cough, emesis (11 Sep 2020 17:27)    Being followed by ID for        Interval history:  pt claims diarrhea is improved  ambulating in MICU  breathing stable  No other acute events        PAST MEDICAL & SURGICAL HISTORY:  H/O gastroesophageal reflux (GERD)  History of COPD  History of ischemic cardiomyopathy  Heart failure with reduced ejection fraction  Hypertension  AICD (automatic cardioverter/defibrillator) present  Diabetes  Stented coronary artery  H/O vasectomy: 20 yrs ago (2000)    Allergies    No Known Allergies    Intolerances      Antimicrobials:    cefTRIAXone   IVPB 2000 milliGRAM(s) IV Intermittent every 24 hours    MEDICATIONS  (STANDING):  aspirin  chewable 81 milliGRAM(s) Oral daily  atorvastatin 80 milliGRAM(s) Oral at bedtime  cefTRIAXone   IVPB 2000 milliGRAM(s) IV Intermittent every 24 hours  chlorhexidine 4% Liquid 1 Application(s) Topical <User Schedule>  clopidogrel Tablet 75 milliGRAM(s) Oral daily  dextrose 5%. 1000 milliLiter(s) (50 mL/Hr) IV Continuous <Continuous>  dextrose 50% Injectable 12.5 Gram(s) IV Push once  dextrose 50% Injectable 25 Gram(s) IV Push once  dextrose 50% Injectable 25 Gram(s) IV Push once  furosemide    Tablet 80 milliGRAM(s) Oral daily  influenza   Vaccine 0.5 milliLiter(s) IntraMuscular once  insulin glargine Injectable (LANTUS) 60 Unit(s) SubCutaneous at bedtime  insulin lispro (HumaLOG) corrective regimen sliding scale   SubCutaneous three times a day before meals  insulin lispro (HumaLOG) corrective regimen sliding scale   SubCutaneous at bedtime  insulin lispro Injectable (HumaLOG) 16 Unit(s) SubCutaneous three times a day before meals  loratadine 10 milliGRAM(s) Oral daily  metoprolol tartrate 25 milliGRAM(s) Oral two times a day  ondansetron Injectable 4 milliGRAM(s) IV Push once  pantoprazole    Tablet 40 milliGRAM(s) Oral before breakfast  tamsulosin 0.4 milliGRAM(s) Oral at bedtime      Vital Signs Last 24 Hrs  T(C): 37.3 (09-11-20 @ 15:00), Max: 37.8 (09-10-20 @ 20:00)  T(F): 99.1 (09-11-20 @ 15:00), Max: 100 (09-10-20 @ 20:00)  HR: 108 (09-11-20 @ 18:00) (100 - 116)  BP: 122/74 (09-11-20 @ 18:00) (91/51 - 122/74)  BP(mean): 93 (09-11-20 @ 18:00) (65 - 93)  RR: 27 (09-11-20 @ 18:00) (17 - 32)  SpO2: 90% (09-11-20 @ 18:00) (84% - 96%)    Physical Exam:    Constitutional well preserved,comfortable,pleasant    HEENT PERRLA EOMI,No pallor or icterus    No oral exudate or erythema    Neck supple no JVD or LN    Chest Good AE,CTA    CVS RRR S1 S2     Abd softly distended     Ext No cyanosis clubbing or edema    IV site no erythema tenderness or discharge    Joints no swelling or LOM    CNS AAO X 3 no focal    Lab Data:                          10.8   8.42  )-----------( 116      ( 11 Sep 2020 00:27 )             34.7       09-11    134<L>  |  98  |  39<H>  ----------------------------<  228<H>  3.8   |  23  |  1.78<H>    Ca    8.9      11 Sep 2020 00:27  Phos  2.5     09-11  Mg     2.4     09-11    TPro  6.5  /  Alb  3.4  /  TBili  0.6  /  DBili  x   /  AST  27  /  ALT  34  /  AlkPhos  58  09-11          .Surgical Swab icd lead  09-10-20   No growth  --  --      .Urine Bladder (from O.R.)  09-10-20   No growth  --  --      .Blood Blood-Peripheral  09-10-20   No growth to date.  --  --      .Blood Blood  09-09-20   No growth to date.  --  --      .Stool Feces sarina garcia  09-09-20   GI PCR Results: NOT detected  *******Please Note:*******  GI panel PCR evaluates for:  Campylobacter, Plesiomonas shigelloides, Salmonella,  Vibrio, Yersinia enterocolitica, Enteroaggregative  Escherichia coli (EAEC), Enteropathogenic E.coli (EPEC),  Enterotoxigenic E. coli (ETEC) lt/st, Shiga-like  toxin-producing E. coli (STEC) stx1/stx2,  Shigella/ Enteroinvasive E. coli (EIEC), Cryptosporidium,  Cyclospora cayetanensis, Entamoeba histolytica,  Giardia lamblia, Adenovirus F 40/41, Astrovirus,  Norovirus GI/GII, Rotavirus A, Sapovirus  --  --      .Urine Clean Catch (Midstream)  09-09-20   <10,000 CFU/mL Normal Urogenital Dorothy  --  --      .Blood Blood-Peripheral  09-09-20   Growth in aerobic and anaerobic bottles: Streptococcus gallolyticus  "Due to technical problems, Proteus sp. will Not be reported as part of  the BCID panel until further notice"  ***Blood Panel PCR results on this specimen are available  approximately 3 hours after the Gram stain result.***  Gram stain, PCR, and/or culture results may not always  correspond due to difference in methodologies.  ************************************************************  This PCR assay was performed using [employer].  The following targets are tested for: Enterococcus,  vancomycin resistant enterococci, Listeria monocytogenes,  coagulase negative staphylococci, S. aureus,  methicillin resistant S. aureus, Streptococcus agalactiae  (Group B), S. pneumoniae, S. pyogenes (Group A),  Acinetobacter baumannii, Enterobacter cloacae, E. coli,  Klebsiella oxytoca, K. pneumoniae, Proteus sp.,  Serratia marcescens, Haemophilus influenzae,  Neisseria meningitidis, Pseudomonas aeruginosa, Candida  albicans, C. glabrata, C krusei, C parapsilosis,  C. tropicalis and the KPC resistance gene.  --  Blood Culture PCR  Streptococcus gallolyticus  Culture - Blood (09.09.20 @ 00:57)    Gram Stain:   Growth in anaerobic bottle: Gram Positive Cocci in Pairs and Chains  Growth in aerobic bottle: Gram Positive Cocci in Pairs and Chains    -  Ceftriaxone: S 0.125    -  Clindamycin: S    -  Erythromycin: S    -  Levofloxacin: S    -  Penicillin: S 0.094    -  Vancomycin: S    -  Streptococcus sp. (Not Grp A, B or S pneumoniae): Detec    Specimen Source: .Blood Blood-Peripheral    Organism: Blood Culture PCR    Organism: Streptococcus gallolyticus    Organism: Streptococcus gallolyticus    Culture Results:   Growth in aerobic and anaerobic bottles: Streptococcus gallolyticus  "Due to technical problems, Proteus sp. will Not be reported as part of  the BCID panel until further notice"  ***Blood Panel PCR results on this specimen are available  approximately 3 hours after the Gram stain result.***  Gram stain, PCR, and/or culture results may not always  correspond due to difference in methodologies.  ************************************************************  This PCR assay was performed using [employer].  The following targets are tested for: Enterococcus,  vancomycin resistant enterococci, Listeria monocytogenes,  coagulase negative staphylococci, S. aureus,  methicillin resistant S. aureus, Streptococcus agalactiae  (Group B), S. pneumoniae, S. pyogenes (Group A),  Acinetobacter baumannii, Enterobacter cloacae, E. coli,  Klebsiella oxytoca, K. pneumoniae, Proteus sp.,  Serratia marcescens, Haemophilus influenzae,  Neisseria meningitidis, Pseudomonas aeruginosa, Candida  albicans, C. glabrata, C krusei, C parapsilosis,  C. tropicalis and the KPC resistance gene.    Organism Identification: Blood Culture PCR  Streptococcus gallolyticus    Method Type: PCR    Method Type: KB    Method Type: ETEST    < from: TTE with Doppler (w/Cont) (09.09.20 @ 05:44) >  Conclusions:  1. Aortic valve not well visualized; appears calcified.  2. Left ventricular enlargement.  3. Endocardial visualization enhanced with intravenous  injection of Ultrasonic Enhancing Agent (Definity).  Severe  global left ventricular systolic dysfunction.No obvious LV  thrombus. Estimated EF of 15-20%. No left ventricular  thrombus.  4. Severe diastolic dysfunction (Stage III).  5. A device wire is noted in the right heart.  6. Tricuspid valve not well visualized. No tricuspid  regurgitation.  7. Pulmonic valve not well visualized.  Unable to rule out endocarditis. Consider DONNIE if clinically  indicated.    < end of copied text >                Vancomycin Level, Random: 6.1 ug/mL (09-10-20 @ 05:38)      WBC Count: 8.42 (09-11-20 @ 00:27)  WBC Count: 8.59 (09-10-20 @ 00:24)  WBC Count: 11.16 (09-09-20 @ 04:33)  WBC Count: 10.28 (09-08-20 @ 20:42)

## 2020-09-11 NOTE — PROGRESS NOTE ADULT - SUBJECTIVE AND OBJECTIVE BOX
24H hour events: no events over night , OOB walking in hallway     MEDICATIONS:  aspirin  chewable 81 milliGRAM(s) Oral daily  clopidogrel Tablet 75 milliGRAM(s) Oral daily  furosemide    Tablet 80 milliGRAM(s) Oral daily  metoprolol tartrate 25 milliGRAM(s) Oral two times a day  tamsulosin 0.4 milliGRAM(s) Oral at bedtime    cefTRIAXone   IVPB 2000 milliGRAM(s) IV Intermittent every 24 hours    ALBUTerol    90 MICROgram(s) HFA Inhaler 2 Puff(s) Inhalation every 12 hours PRN  loratadine 10 milliGRAM(s) Oral daily    acetaminophen   Tablet .. 650 milliGRAM(s) Oral every 6 hours PRN  ondansetron Injectable 4 milliGRAM(s) IV Push once    pantoprazole    Tablet 40 milliGRAM(s) Oral before breakfast    atorvastatin 80 milliGRAM(s) Oral at bedtime  dextrose 40% Gel 15 Gram(s) Oral once PRN  dextrose 50% Injectable 12.5 Gram(s) IV Push once  dextrose 50% Injectable 25 Gram(s) IV Push once  dextrose 50% Injectable 25 Gram(s) IV Push once  glucagon  Injectable 1 milliGRAM(s) IntraMuscular once PRN  insulin glargine Injectable (LANTUS) 60 Unit(s) SubCutaneous at bedtime  insulin lispro (HumaLOG) corrective regimen sliding scale   SubCutaneous three times a day before meals  insulin lispro (HumaLOG) corrective regimen sliding scale   SubCutaneous at bedtime  insulin lispro Injectable (HumaLOG) 16 Unit(s) SubCutaneous three times a day before meals    chlorhexidine 4% Liquid 1 Application(s) Topical <User Schedule>  dextrose 5%. 1000 milliLiter(s) IV Continuous <Continuous>  influenza   Vaccine 0.5 milliLiter(s) IntraMuscular once      REVIEW OF SYSTEMS:  See HPI, otherwise ROS negative.    PHYSICAL EXAM:  T(C): 37.3 (09-11-20 @ 15:00), Max: 37.8 (09-10-20 @ 20:00)  HR: 105 (09-11-20 @ 17:00) (100 - 116)  BP: 121/70 (09-11-20 @ 17:00) (91/51 - 121/70)  RR: 26 (09-11-20 @ 17:00) (17 - 32)  SpO2: 92% (09-11-20 @ 17:00) (84% - 96%)    I&O's Summary    10 Sep 2020 07:01  -  11 Sep 2020 07:00  --------------------------------------------------------  IN: 0 mL / OUT: 2285 mL / NET: -2285 mL    11 Sep 2020 07:01  -  11 Sep 2020 17:33  --------------------------------------------------------  IN: 800 mL / OUT: 1300 mL / NET: -500 mL    Appearance: Alert. NAD	MO  Cardiovascular: +S1S2 RRR no m/g/r  Respiratory: CTA B/L	  Psychiatry: A & O x 3, Mood & affect appropriate  Gastrointestinal: obese  Soft, NT. ND. +BS	  Skin: left infraclavicular incision line flat no bleeding no hematoma 	  Neurologic: Non-focal  Extremities: No edema BLE  Vascular: Peripheral pulses palpable 2+ bilaterally    LABS:	 	Culture - Blood (09.10.20 @ 09:28)    Specimen Source: .Blood Blood-Peripheral    Culture Results:   No growth to date.    Culture - Blood (09.10.20 @ 09:28)    Specimen Source: .Blood Blood-Peripheral    Culture Results:   No growth to date.    Culture - Blood (09.09.20 @ 00:57)  Gram Stain: Growth in aerobic and anaerobic bottles: Gram Positive Cocci in Pairs and  Chains  Specimen Source: .Blood Blood-Peripheral  Culture Results:   Growth in aerobic and anaerobic bottles: Gram Positive Cocci in Pairs and  Chains    Culture - Blood (09.09.20 @ 00:57)  Gram Stain:   Growth in anaerobic bottle: Gram Positive Cocci in Pairs and Chains  Growth in aerobic bottle: Gram Positive Cocci in Pairs and Chains    -  Streptococcus sp. (Not Grp A, B or S pneumoniae): Detec    Specimen Source: .Blood Blood-Peripheral    Organism: Blood Culture PCR    Culture Results:   Growth in anaerobic bottle: Gram Positive Cocci in Pairs and Chains  Growth in aerobic bottle: Gram Positive Cocci in Pairs and Chains      CBC Full  -  ( 11 Sep 2020 00:27 )  WBC Count : 8.42 K/uL  Hemoglobin : 10.8 g/dL  Hematocrit : 34.7 %  Platelet Count - Automated : 116 K/uL  Mean Cell Volume : 81.1 fl  Mean Cell Hemoglobin : 25.2 pg  Mean Cell Hemoglobin Concentration : 31.1 gm/dL  Auto Neutrophil # : 6.33 K/uL  Auto Lymphocyte # : 1.11 K/uL  Auto Monocyte # : 0.88 K/uL  Auto Eosinophil # : 0.02 K/uL  Auto Basophil # : 0.03 K/uL  Auto Neutrophil % : 75.1 %  Auto Lymphocyte % : 13.2 %  Auto Monocyte % : 10.5 %  Auto Eosinophil % : 0.2 %  Auto Basophil % : 0.4 %    09-11    134<L>  |  98  |  39<H>  ----------------------------<  228<H>  3.8   |  23  |  1.78<H>  09-10    135  |  96  |  37<H>  ----------------------------<  312<H>  3.2<L>   |  25  |  1.54<H>    Ca    8.9      11 Sep 2020 00:27  Ca    8.5      10 Sep 2020 14:38  Phos  2.5     09-11  Phos  2.9     09-10  Mg     2.4     09-11  Mg     2.2     09-10    TPro  6.5  /  Alb  3.4  /  TBili  0.6  /  DBili  x   /  AST  27  /  ALT  34  /  AlkPhos  58  09-11  proBNP: Serum Pro-Brain Natriuretic Peptide: 1158 pg/mL (09-08 @ 20:42)    TSH: Thyroid Stimulating Hormone, Serum (09.09.20 @ 07:25)    Thyroid Stimulating Hormone, Serum: 1.74 uIU/mL    < from: TTE with Doppler (w/Cont) (09.09.20 @ 05:44) >  ------------------------------------------------------------------------  PROCEDURE: Transthoracic echocardiogram with 2-D, M-Mode  and complete spectral and color flow Doppler.Verbal  consent was obtained for injection of  Ultrasonic Enhancing  Agent following a discussion of risks and benefits.  Following intravenous injection of Ultrasonic Enhancing  Agent , harmonic imaging was performed.  INDICATION: Endocarditis, valve unspecified (I38)  ------------------------------------------------------------------------  Dimensions:    Normal Values:  LA:            2.0 - 4.0 cm  Ao:            2.0 - 3.8 cm  SEPTUM:        0.6 - 1.2 cm  PWT:           0.6 - 1.1 cm  LVIDd:     3.0 - 5.6 cm  LVIDs:         1.8 - 4.0 cm  EF (Visual Estimate): 15-20 %  ------------------------------------------------------------------------  Observations:  Mitral Valve: Tethered mitral valve leaflets with normal  opening-not well visualized. Minimal mitral regurgitation.  Aortic Valve/Aorta: Aortic valve not well visualized;  appears calcified. Peak left ventricular outflow tract  gradient equals 1 mm Hg, LVOT velocity time integral equals  8 cm.  Not well visualized.  Left Atrium: Left atrium not well visualized, probably  normal.  Left Ventricle: Endocardial visualization enhanced with  intravenous injection of Ultrasonic Enhancing Agent  (Definity).  Severe global left ventricular systolic  dysfunction.No obvious LV thrombus. Estimated EF of 15-20%.  No left ventricular thrombus. Left ventricular enlargement.  Severe diastolic dysfunction (Stage III).  Right Heart: A device wire is noted in the right heart. The  right ventricle is not well visualized. Tricuspid valve not  well visualized. No tricuspid regurgitation. Pulmonic valve  not well visualized.  Pericardium/Pleura: Normal pericardium with no pericardial  effusion.  Hemodynamic: Estimated right atrial pressure is 8 mm Hg.  ------------------------------------------------------------------------  Conclusions:  1. Aortic valve not well visualized; appears calcified.  2. Left ventricular enlargement.  3. Endocardial visualization enhanced with intravenous  injection of Ultrasonic Enhancing Agent (Definity).  Severe  global left ventricular systolic dysfunction.No obvious LV  thrombus. Estimated EF of 15-20%. No left ventricular  thrombus.  4. Severe diastolic dysfunction (Stage III).  5. A device wire is noted in the right heart.  6. Tricuspid valve not well visualized. No tricuspid  regurgitation.  7. Pulmonic valve not well visualized.  Unable to rule out endocarditis. Consider DONNIE if clinically  indicated.  ------------------------------------------------------------------------  Confirmed on  9/10/2020 - 11:09:29 by Raza Kraft M.D.  ------------------------------------------------------------------------

## 2020-09-11 NOTE — DIETITIAN INITIAL EVALUATION ADULT. - ENERGY NEEDS
Ht: 77"  Wt: 285  BMI: 33.9 kg/m2   IBW: 208 (+/-10%)     137% IBW  Edema: 1+generalized        Skin: no pressure injuries documented

## 2020-09-11 NOTE — PROGRESS NOTE ADULT - ASSESSMENT
Assessment: 59M PMH T2DM, HTN, CAD/MI s/p stents (most recent 2/2018), ICM w/ HFrEF (10-15%) s/p AICD, COPD (not on home O2), HTN, GERD, BPH, who is admitted for sepsis shock 2/2 strep gallolyticus. Admitted to MICU for hypotension although no pressors required, improving s/p AICD removal bed-boarded.     Neuro  - AOx3, no active issues    Respiratory  - Small R pleural collection on CT, CXR with clr lungs  - c/w home albuterol prn  - Pt satting well on RA  - Patient with mild sleep apnea diagnosed 2019, recommend outpatient followup    Cardiovascular  - d/c midodrine  - resuming home metoprolol but holding entresto, indapamide.   - Continue home lasix 80 qd    # HFrEF s/p AICD  - most recent echo from 12/2019: EF 10-15% sev global LV systolic dysfunction. eccentric LVH  - cardiology consulted. appreciate recs.  - resumed b-blocker but holding onto rest of HF meds.  - S/p AICD extraction  - Appreciate EP recs for plan for restarting.     # CAD s/p stent  - c/w home DAPT (ASA/PLV) and high dose statin.    GI/NUTRITION  - GI PCR negative. Stool culture pending  - Continuing home pantoprazole po 40mg  - diabetic diet.  - GI consult for colonoscopy given Strep bovis bacteremia.     Renal  - MANJIT improving - BUN/SCr 39/1.78 (1.4-1.8 during 12/2019 admission)  - Peguero in place, adequate urine output with home diuretics  - Atrophied R kidney w/ chronic R hydronephrosis which was also partially imaged on CT Chest from 11/11/2019  - nephrology and urology consult - no need for urgent nephrostomy tube, can place later when patient is stable or if Cr continues to worsen.     #BPH  - continuing home tamsulosin 0.4mg  - Peguero as above    Hematologic  - Monitor CBC, currently stable    #DVT ppx  - SCDs for DVT ppx    ID  # Strep Gallolyticus bacteremia  - ID recs - Cont ceftriaxone 2g. Appreciate recs on duration  - awaiting final sensitivities. F/u blood cultures no growth yet.   - Stool Cx/GI PCR pending, no growth yet.     ENDOCRINE:   - Pt w/ hx of IDDM on home Lantus 74u qhs and Humalog 50u pre-meal per pt  - Endocrine consulted, continue basal bolus 50 Lantus, 10 premeal and adjust from there.   - Will monitor FSS with moderate ISS    Ethics  - GOC discussed Patient wants full code.

## 2020-09-11 NOTE — CONSULT NOTE ADULT - REASON FOR ADMISSION
fever, cough, emesis

## 2020-09-11 NOTE — CONSULT NOTE ADULT - SUBJECTIVE AND OBJECTIVE BOX
Chief Complaint:  Patient is a 59y old  Male who presents with a chief complaint of fever, cough, emesis (11 Sep 2020 13:13)    HPI: Pt is a 58 yo man with HTN, T2DM, CAD/MI s/p stents (most recent 2/2018), HFrEF (10-15%) s/p ICD, COPD, HTN, GERD, BPH admitted for sepsis secondary to gram+ Strep Gallolyticus bacteremia. Patient was briefly in MICU for severe sepsis. Patient now is s/p AICD 9/10/2020 for source control. GI consulted for colonoscopy to rule out colon malignancy. Patient states he has never had colonoscopy. Repots he normally has 1 brown BM daily. No issues with melena or hematochezia. Since being in hospital has had 2 episodes of loose stool the past two days. No fever or chills, n/v, chills or shortness of breath.     Allergies:  No Known Allergies      Home Medications:    Hospital Medications:  acetaminophen   Tablet .. 650 milliGRAM(s) Oral every 6 hours PRN  ALBUTerol    90 MICROgram(s) HFA Inhaler 2 Puff(s) Inhalation every 12 hours PRN  aspirin  chewable 81 milliGRAM(s) Oral daily  atorvastatin 80 milliGRAM(s) Oral at bedtime  cefTRIAXone   IVPB 2000 milliGRAM(s) IV Intermittent every 24 hours  chlorhexidine 4% Liquid 1 Application(s) Topical <User Schedule>  clopidogrel Tablet 75 milliGRAM(s) Oral daily  dextrose 40% Gel 15 Gram(s) Oral once PRN  dextrose 5%. 1000 milliLiter(s) IV Continuous <Continuous>  dextrose 50% Injectable 12.5 Gram(s) IV Push once  dextrose 50% Injectable 25 Gram(s) IV Push once  dextrose 50% Injectable 25 Gram(s) IV Push once  furosemide    Tablet 80 milliGRAM(s) Oral daily  glucagon  Injectable 1 milliGRAM(s) IntraMuscular once PRN  influenza   Vaccine 0.5 milliLiter(s) IntraMuscular once  insulin glargine Injectable (LANTUS) 50 Unit(s) SubCutaneous at bedtime  insulin lispro (HumaLOG) corrective regimen sliding scale   SubCutaneous three times a day before meals  insulin lispro (HumaLOG) corrective regimen sliding scale   SubCutaneous at bedtime  insulin lispro Injectable (HumaLOG) 10 Unit(s) SubCutaneous three times a day before meals  loratadine 10 milliGRAM(s) Oral daily  metoprolol tartrate 25 milliGRAM(s) Oral two times a day  ondansetron Injectable 4 milliGRAM(s) IV Push once  pantoprazole    Tablet 40 milliGRAM(s) Oral before breakfast  tamsulosin 0.4 milliGRAM(s) Oral at bedtime      PMHX/PSHX:  H/O gastroesophageal reflux (GERD)  History of COPD  History of ischemic cardiomyopathy  Heart failure with reduced ejection fraction  Hypertension  AICD (automatic cardioverter/defibrillator) present  Diabetes  Stented coronary artery  H/O vasectomy  No significant past surgical history      Family history:  Family history of cardiac disorder  Family history of COPD (chronic obstructive pulmonary disease) (Sibling)  No pertinent family history in first degree relatives      Social History:     ROS: As per HPI, 14-point ROS negative otherwise.    General:  No wt loss, fevers, chills, night sweats, fatigue,   Eyes:  Good vision, no reported pain  ENT:  No sore throat, pain, runny nose, dysphagia  CV:  No pain, palpitations, hypo/hypertension  Resp:  No dyspnea, cough, tachypnea, wheezing  GI:  See HPI  :  No pain, bleeding, incontinence, nocturia  Muscle:  No pain, weakness  Neuro:  No weakness, tingling, memory problems  Psych:  No fatigue, insomnia, mood problems, depression  Endocrine:  No polyuria, polydipsia, cold/heat intolerance  Heme:  No petechiae, ecchymosis, easy bruisability  Skin:  No rash, edema      PHYSICAL EXAM:     Vital Signs:  Vital Signs Last 24 Hrs  T(C): 37.3 (11 Sep 2020 11:00), Max: 37.8 (10 Sep 2020 20:00)  T(F): 99.1 (11 Sep 2020 11:00), Max: 100 (10 Sep 2020 20:00)  HR: 105 (11 Sep 2020 14:00) (100 - 123)  BP: 98/57 (11 Sep 2020 14:00) (91/51 - 117/64)  BP(mean): 71 (11 Sep 2020 14:00) (65 - 89)  RR: 24 (11 Sep 2020 14:00) (17 - 32)  SpO2: 93% (11 Sep 2020 14:00) (84% - 97%)  Daily Height in cm: 195.58 (11 Sep 2020 12:35)    Daily     GENERAL:  Appears stated age, well-groomed, well-nourished, no distress  HEENT:  NC/AT,  conjunctivae clear and pink  CHEST:  Full & symmetric excursion, no increased effort  HEART:  Regular rhythm, no JVD  ABDOMEN:  obese, soft, non-tender, non-distended, normoactive bowel sounds,  no masses , no hepatosplenomegaly  EXTREMITIES:  no cyanosis, clubbing or edema  SKIN:  No rash/erythema/ecchymoses/petechiae/wounds/abscess/warm/dry  NEURO:  Alert, oriented, nonfocal    LABS:                        10.8   8.42  )-----------( 116      ( 11 Sep 2020 00:27 )             34.7     09-11    134<L>  |  98  |  39<H>  ----------------------------<  228<H>  3.8   |  23  |  1.78<H>    Ca    8.9      11 Sep 2020 00:27  Phos  2.5     09-11  Mg     2.4     09-11    TPro  6.5  /  Alb  3.4  /  TBili  0.6  /  DBili  x   /  AST  27  /  ALT  34  /  AlkPhos  58  09-11    LIVER FUNCTIONS - ( 11 Sep 2020 00:27 )  Alb: 3.4 g/dL / Pro: 6.5 g/dL / ALK PHOS: 58 U/L / ALT: 34 U/L / AST: 27 U/L / GGT: x           PT/INR - ( 11 Sep 2020 00:27 )   PT: 13.2 sec;   INR: 1.12 ratio         PTT - ( 11 Sep 2020 00:27 )  PTT:27.3 sec        Imaging:  < from: CT Abdomen and Pelvis No Cont (09.09.20 @ 01:26) >  FINDINGS:    Evaluation of the parenchymal organs and vascular structures is limited without intravenous contrast.    CHEST:  LUNGS AND LARGE AIRWAYS: Patent central airways. Paraseptal emphysema. Mild compressive atelectasis in the right lower lobe. No focal areas of consolidation.  PLEURA: Small right pleural effusion.  VESSELS: Minimal aortic calcifications.  HEART: Cardiomegaly. No pericardial effusion. Coronary artery stents.  MEDIASTINUM AND BOB: Mediastinal lymph nodes, unchanged.  An anterior mediastinal lymph node measures 1.4 x 1.2 cm (2:23).  CHEST WALL AND LOWER NECK: Left-sided chest wall AICD.    ABDOMEN AND PELVIS:  LIVER: Hepatomegalyl and diffuse hepatic steatosis. Focal fatty sparing is noted adjacent to the gallbladder.  BILE DUCTS: Normal caliber.  GALLBLADDER: Within normal limits.  SPLEEN: Spleen is enlarged, measuring 15.3 cm in length.  PANCREAS: Within normal limits.  ADRENALS: Within normal limits.  KIDNEYS/URETERS: Atrophic right kidney with chronic right hydronephrosis, similar to 11/11/2019. Right renal cyst. An indeterminate 0.7 cm soft tissue density is noted in the interpolar region of the right kidney. A few nonobstructing left renal calculi measuring up to 3 mm. No hydronephrosis of the left kidney.    BLADDER: Underdistended.  REPRODUCTIVE ORGANS: Prostate is mildly enlarged.    BOWEL: Scattered colonic diverticula. No bowel obstruction. Appendectomy.  PERITONEUM: No ascites.  VESSELS: Atherosclerotic calcification.  RETROPERITONEUM/LYMPH NODES: Less than 1 cm retroperitoneal lymph nodes.  ABDOMINAL WALL: Within normal limits.  BONES: Mild degenerative changes in the spine.    IMPRESSION:  No pneumonia.    Emphysema.    No bowel or obstruction.    Hepatomegaly and diffuse hepatic steatosis.    Splenomegaly.    Atrophic right kidney, with severe hydronephrosis. A 0.7 cm soft tissue density is noted in the interpolar region of the right kidney and is incompletely characterized. Ultrasound may be helpful for further evaluation.      < end of copied text > Chief Complaint:  Patient is a 59y old  Male who presents with a chief complaint of fever, cough, emesis (11 Sep 2020 13:13)    HPI: Pt is a 58 yo man with HTN, T2DM, CAD/MI s/p stents (most recent 2/2018), HFrEF (10-15%) s/p ICD, COPD, HTN, GERD, BPH admitted for sepsis secondary to gram+ Strep Gallolyticus bacteremia. Patient was briefly in MICU for severe sepsis. Patient now is s/p AICD 9/10/2020 for source control. GI consulted for colonoscopy to rule out colon malignancy. Patient states he has never had colonoscopy. Repots he normally has 1 brown BM daily. No issues with melena or hematochezia. Since being in hospital has had 2 episodes of loose stool the past two days. No fever or chills, n/v, chills or shortness of breath. He has no abdominal pain. There is no family history of colon or GI cancer in his family.    Allergies:  No Known Allergies      Home Medications:    Hospital Medications:  acetaminophen   Tablet .. 650 milliGRAM(s) Oral every 6 hours PRN  ALBUTerol    90 MICROgram(s) HFA Inhaler 2 Puff(s) Inhalation every 12 hours PRN  aspirin  chewable 81 milliGRAM(s) Oral daily  atorvastatin 80 milliGRAM(s) Oral at bedtime  cefTRIAXone   IVPB 2000 milliGRAM(s) IV Intermittent every 24 hours  chlorhexidine 4% Liquid 1 Application(s) Topical <User Schedule>  clopidogrel Tablet 75 milliGRAM(s) Oral daily  dextrose 40% Gel 15 Gram(s) Oral once PRN  dextrose 5%. 1000 milliLiter(s) IV Continuous <Continuous>  dextrose 50% Injectable 12.5 Gram(s) IV Push once  dextrose 50% Injectable 25 Gram(s) IV Push once  dextrose 50% Injectable 25 Gram(s) IV Push once  furosemide    Tablet 80 milliGRAM(s) Oral daily  glucagon  Injectable 1 milliGRAM(s) IntraMuscular once PRN  influenza   Vaccine 0.5 milliLiter(s) IntraMuscular once  insulin glargine Injectable (LANTUS) 50 Unit(s) SubCutaneous at bedtime  insulin lispro (HumaLOG) corrective regimen sliding scale   SubCutaneous three times a day before meals  insulin lispro (HumaLOG) corrective regimen sliding scale   SubCutaneous at bedtime  insulin lispro Injectable (HumaLOG) 10 Unit(s) SubCutaneous three times a day before meals  loratadine 10 milliGRAM(s) Oral daily  metoprolol tartrate 25 milliGRAM(s) Oral two times a day  ondansetron Injectable 4 milliGRAM(s) IV Push once  pantoprazole    Tablet 40 milliGRAM(s) Oral before breakfast  tamsulosin 0.4 milliGRAM(s) Oral at bedtime      PMHX/PSHX:  H/O gastroesophageal reflux (GERD)  History of COPD  History of ischemic cardiomyopathy  Heart failure with reduced ejection fraction  Hypertension  AICD (automatic cardioverter/defibrillator) present  Diabetes  Stented coronary artery  H/O vasectomy  No significant past surgical history      Family history:  Family history of cardiac disorder  Family history of COPD (chronic obstructive pulmonary disease) (Sibling)  No pertinent family history in first degree relatives      Social History: No illicit drugs; +tattooes    ROS: As per HPI, 14-point ROS negative otherwise.    General:  No wt loss, fevers, chills, night sweats, fatigue,   Eyes:  Good vision, no reported pain  ENT:  No sore throat, pain, runny nose, dysphagia  CV:  No pain, palpitations, hypo/hypertension  Resp:  No dyspnea, cough, tachypnea, wheezing  GI:  See HPI  :  No pain, bleeding, incontinence, nocturia  Muscle:  No pain, weakness  Neuro:  No weakness, tingling, memory problems  Psych:  No fatigue, insomnia, mood problems, depression  Endocrine:  No polyuria, polydipsia, cold/heat intolerance  Heme:  No petechiae, ecchymosis, easy bruisability  Skin:  No rash, edema      PHYSICAL EXAM:     Vital Signs:  Vital Signs Last 24 Hrs  T(C): 37.3 (11 Sep 2020 11:00), Max: 37.8 (10 Sep 2020 20:00)  T(F): 99.1 (11 Sep 2020 11:00), Max: 100 (10 Sep 2020 20:00)  HR: 105 (11 Sep 2020 14:00) (100 - 123)  BP: 98/57 (11 Sep 2020 14:00) (91/51 - 117/64)  BP(mean): 71 (11 Sep 2020 14:00) (65 - 89)  RR: 24 (11 Sep 2020 14:00) (17 - 32)  SpO2: 93% (11 Sep 2020 14:00) (84% - 97%)  Daily Height in cm: 195.58 (11 Sep 2020 12:35)    Daily     GENERAL:  Appears stated age, well-groomed, well-nourished, no distress  HEENT:  NC/AT,  conjunctivae clear and pink  CHEST:  Full & symmetric excursion, no increased effort  HEART:  Regular rhythm, no JVD  ABDOMEN:  obese, soft, non-tender, non-distended, normoactive bowel sounds,  no masses , no hepatosplenomegaly  EXTREMITIES:  no cyanosis, clubbing or edema  SKIN:  No rash/erythema/ecchymoses/petechiae/wounds/abscess/warm/dry  NEURO:  Alert, oriented, nonfocal    LABS:                        10.8   8.42  )-----------( 116      ( 11 Sep 2020 00:27 )             34.7     09-11    134<L>  |  98  |  39<H>  ----------------------------<  228<H>  3.8   |  23  |  1.78<H>    Ca    8.9      11 Sep 2020 00:27  Phos  2.5     09-11  Mg     2.4     09-11    TPro  6.5  /  Alb  3.4  /  TBili  0.6  /  DBili  x   /  AST  27  /  ALT  34  /  AlkPhos  58  09-11    LIVER FUNCTIONS - ( 11 Sep 2020 00:27 )  Alb: 3.4 g/dL / Pro: 6.5 g/dL / ALK PHOS: 58 U/L / ALT: 34 U/L / AST: 27 U/L / GGT: x           PT/INR - ( 11 Sep 2020 00:27 )   PT: 13.2 sec;   INR: 1.12 ratio         PTT - ( 11 Sep 2020 00:27 )  PTT:27.3 sec        Imaging:  < from: CT Abdomen and Pelvis No Cont (09.09.20 @ 01:26) >  FINDINGS:    Evaluation of the parenchymal organs and vascular structures is limited without intravenous contrast.    CHEST:  LUNGS AND LARGE AIRWAYS: Patent central airways. Paraseptal emphysema. Mild compressive atelectasis in the right lower lobe. No focal areas of consolidation.  PLEURA: Small right pleural effusion.  VESSELS: Minimal aortic calcifications.  HEART: Cardiomegaly. No pericardial effusion. Coronary artery stents.  MEDIASTINUM AND BOB: Mediastinal lymph nodes, unchanged.  An anterior mediastinal lymph node measures 1.4 x 1.2 cm (2:23).  CHEST WALL AND LOWER NECK: Left-sided chest wall AICD.    ABDOMEN AND PELVIS:  LIVER: Hepatomegalyl and diffuse hepatic steatosis. Focal fatty sparing is noted adjacent to the gallbladder.  BILE DUCTS: Normal caliber.  GALLBLADDER: Within normal limits.  SPLEEN: Spleen is enlarged, measuring 15.3 cm in length.  PANCREAS: Within normal limits.  ADRENALS: Within normal limits.  KIDNEYS/URETERS: Atrophic right kidney with chronic right hydronephrosis, similar to 11/11/2019. Right renal cyst. An indeterminate 0.7 cm soft tissue density is noted in the interpolar region of the right kidney. A few nonobstructing left renal calculi measuring up to 3 mm. No hydronephrosis of the left kidney.    BLADDER: Underdistended.  REPRODUCTIVE ORGANS: Prostate is mildly enlarged.    BOWEL: Scattered colonic diverticula. No bowel obstruction. Appendectomy.  PERITONEUM: No ascites.  VESSELS: Atherosclerotic calcification.  RETROPERITONEUM/LYMPH NODES: Less than 1 cm retroperitoneal lymph nodes.  ABDOMINAL WALL: Within normal limits.  BONES: Mild degenerative changes in the spine.    IMPRESSION:  No pneumonia.    Emphysema.    No bowel or obstruction.    Hepatomegaly and diffuse hepatic steatosis.    Splenomegaly.    Atrophic right kidney, with severe hydronephrosis. A 0.7 cm soft tissue density is noted in the interpolar region of the right kidney and is incompletely characterized. Ultrasound may be helpful for further evaluation.      < end of copied text >

## 2020-09-11 NOTE — PROGRESS NOTE ADULT - ASSESSMENT
Patient is a 59 year-old with known ischemic cardiomyopathy status post ICD ([employer], implanted in January 2012), presents with GI complaints, hypotension, found to have sepsis secondary to gram positive bacteremia of unclear source.  ICD explanted yesterday.  Patient now off IV pressor support.    ID consult for bacteremia. Will follow-up DONNIE to evaluate possible lesion on mitral valve.  EP to evaluate risks/benefits of possible subcutaneous ICD implant vs. wearable defibrillator (LifeVest) prior to discharge.    Will slowly restart beta-blocker and Entresto as hemodynamics and renal function permit once patient is off midodrine.

## 2020-09-11 NOTE — PROGRESS NOTE ADULT - SUBJECTIVE AND OBJECTIVE BOX
Chief complaint  Patient is a 59y old  Male who presents with a chief complaint of fever, cough, emesis (11 Sep 2020 11:21)   Review of systems  Patient in bed, starting to eat meals, no hypoglycemic episodes.    Labs and Fingersticks  CAPILLARY BLOOD GLUCOSE      POCT Blood Glucose.: 321 mg/dL (11 Sep 2020 12:33)  POCT Blood Glucose.: 238 mg/dL (11 Sep 2020 11:04)  POCT Blood Glucose.: 233 mg/dL (11 Sep 2020 09:50)  POCT Blood Glucose.: 169 mg/dL (10 Sep 2020 21:21)  POCT Blood Glucose.: 171 mg/dL (10 Sep 2020 19:53)  POCT Blood Glucose.: 273 mg/dL (10 Sep 2020 17:18)  POCT Blood Glucose.: 331 mg/dL (10 Sep 2020 13:32)    Medications  MEDICATIONS  (STANDING):  aspirin  chewable 81 milliGRAM(s) Oral daily  atorvastatin 80 milliGRAM(s) Oral at bedtime  cefTRIAXone   IVPB 2000 milliGRAM(s) IV Intermittent every 24 hours  chlorhexidine 4% Liquid 1 Application(s) Topical <User Schedule>  clopidogrel Tablet 75 milliGRAM(s) Oral daily  dextrose 5%. 1000 milliLiter(s) (50 mL/Hr) IV Continuous <Continuous>  dextrose 50% Injectable 12.5 Gram(s) IV Push once  dextrose 50% Injectable 25 Gram(s) IV Push once  dextrose 50% Injectable 25 Gram(s) IV Push once  furosemide    Tablet 80 milliGRAM(s) Oral daily  influenza   Vaccine 0.5 milliLiter(s) IntraMuscular once  insulin glargine Injectable (LANTUS) 50 Unit(s) SubCutaneous at bedtime  insulin lispro (HumaLOG) corrective regimen sliding scale   SubCutaneous three times a day before meals  insulin lispro (HumaLOG) corrective regimen sliding scale   SubCutaneous at bedtime  insulin lispro Injectable (HumaLOG) 10 Unit(s) SubCutaneous three times a day before meals  insulin lispro Injectable (HumaLOG) 10 Unit(s) SubCutaneous once  loratadine 10 milliGRAM(s) Oral daily  metoprolol tartrate 25 milliGRAM(s) Oral two times a day  ondansetron Injectable 4 milliGRAM(s) IV Push once  pantoprazole    Tablet 40 milliGRAM(s) Oral before breakfast  tamsulosin 0.4 milliGRAM(s) Oral at bedtime      Physical Exam  Culture - Urine (collected 09-10-20 @ 16:58)  Source: .Urine Bladder (from O.R.)  Final Report (09-11-20 @ 13:08):    No growth      General: Patient comfortable in bed  Vital Signs Last 12 Hrs  T(F): 99.1 (09-11-20 @ 11:00), Max: 99.6 (09-11-20 @ 04:00)  HR: 110 (09-11-20 @ 12:00) (100 - 111)  BP: 96/61 (09-11-20 @ 12:00) (91/51 - 117/64)  BP(mean): 74 (09-11-20 @ 12:00) (65 - 86)  RR: 17 (09-11-20 @ 12:00) (17 - 22)  SpO2: 92% (09-11-20 @ 12:00) (84% - 96%)  Neck: No palpable thyroid nodules. Chief complaint  Patient is a 59y old  Male who presents with a chief complaint of fever, cough, emesis (11 Sep 2020 11:21)   Review of systems  Patient in bed, starting to eat meals, no hypoglycemic episodes.    Labs and Fingersticks  CAPILLARY BLOOD GLUCOSE    POCT Blood Glucose.: 321 mg/dL (11 Sep 2020 12:33)  POCT Blood Glucose.: 238 mg/dL (11 Sep 2020 11:04)  POCT Blood Glucose.: 233 mg/dL (11 Sep 2020 09:50)  POCT Blood Glucose.: 169 mg/dL (10 Sep 2020 21:21)  POCT Blood Glucose.: 171 mg/dL (10 Sep 2020 19:53)  POCT Blood Glucose.: 273 mg/dL (10 Sep 2020 17:18)  POCT Blood Glucose.: 331 mg/dL (10 Sep 2020 13:32)    Medications  MEDICATIONS  (STANDING):  aspirin  chewable 81 milliGRAM(s) Oral daily  atorvastatin 80 milliGRAM(s) Oral at bedtime  cefTRIAXone   IVPB 2000 milliGRAM(s) IV Intermittent every 24 hours  chlorhexidine 4% Liquid 1 Application(s) Topical <User Schedule>  clopidogrel Tablet 75 milliGRAM(s) Oral daily  dextrose 5%. 1000 milliLiter(s) (50 mL/Hr) IV Continuous <Continuous>  dextrose 50% Injectable 12.5 Gram(s) IV Push once  dextrose 50% Injectable 25 Gram(s) IV Push once  dextrose 50% Injectable 25 Gram(s) IV Push once  furosemide    Tablet 80 milliGRAM(s) Oral daily  influenza   Vaccine 0.5 milliLiter(s) IntraMuscular once  insulin glargine Injectable (LANTUS) 50 Unit(s) SubCutaneous at bedtime  insulin lispro (HumaLOG) corrective regimen sliding scale   SubCutaneous three times a day before meals  insulin lispro (HumaLOG) corrective regimen sliding scale   SubCutaneous at bedtime  insulin lispro Injectable (HumaLOG) 10 Unit(s) SubCutaneous three times a day before meals  insulin lispro Injectable (HumaLOG) 10 Unit(s) SubCutaneous once  loratadine 10 milliGRAM(s) Oral daily  metoprolol tartrate 25 milliGRAM(s) Oral two times a day  ondansetron Injectable 4 milliGRAM(s) IV Push once  pantoprazole    Tablet 40 milliGRAM(s) Oral before breakfast  tamsulosin 0.4 milliGRAM(s) Oral at bedtime      Physical Exam  Culture - Urine (collected 09-10-20 @ 16:58)  Source: .Urine Bladder (from O.R.)  Final Report (09-11-20 @ 13:08):    No growth      General: Patient comfortable in bed  Vital Signs Last 12 Hrs  T(F): 99.1 (09-11-20 @ 11:00), Max: 99.6 (09-11-20 @ 04:00)  HR: 110 (09-11-20 @ 12:00) (100 - 111)  BP: 96/61 (09-11-20 @ 12:00) (91/51 - 117/64)  BP(mean): 74 (09-11-20 @ 12:00) (65 - 86)  RR: 17 (09-11-20 @ 12:00) (17 - 22)  SpO2: 92% (09-11-20 @ 12:00) (84% - 96%)  Neck: No palpable thyroid nodules.

## 2020-09-12 LAB
ANION GAP SERPL CALC-SCNC: 14 MMOL/L — SIGNIFICANT CHANGE UP (ref 5–17)
APTT BLD: 28.1 SEC — SIGNIFICANT CHANGE UP (ref 27.5–35.5)
BUN SERPL-MCNC: 41 MG/DL — HIGH (ref 7–23)
CALCIUM SERPL-MCNC: 9 MG/DL — SIGNIFICANT CHANGE UP (ref 8.4–10.5)
CHLORIDE SERPL-SCNC: 96 MMOL/L — SIGNIFICANT CHANGE UP (ref 96–108)
CO2 SERPL-SCNC: 25 MMOL/L — SIGNIFICANT CHANGE UP (ref 22–31)
CREAT SERPL-MCNC: 1.63 MG/DL — HIGH (ref 0.5–1.3)
GLUCOSE BLDC GLUCOMTR-MCNC: 222 MG/DL — HIGH (ref 70–99)
GLUCOSE BLDC GLUCOMTR-MCNC: 227 MG/DL — HIGH (ref 70–99)
GLUCOSE BLDC GLUCOMTR-MCNC: 261 MG/DL — HIGH (ref 70–99)
GLUCOSE BLDC GLUCOMTR-MCNC: 262 MG/DL — HIGH (ref 70–99)
GLUCOSE SERPL-MCNC: 246 MG/DL — HIGH (ref 70–99)
HCT VFR BLD CALC: 34 % — LOW (ref 39–50)
HGB BLD-MCNC: 11.1 G/DL — LOW (ref 13–17)
INR BLD: 1.06 RATIO — SIGNIFICANT CHANGE UP (ref 0.88–1.16)
MAGNESIUM SERPL-MCNC: 2.3 MG/DL — SIGNIFICANT CHANGE UP (ref 1.6–2.6)
MCHC RBC-ENTMCNC: 26.1 PG — LOW (ref 27–34)
MCHC RBC-ENTMCNC: 32.6 GM/DL — SIGNIFICANT CHANGE UP (ref 32–36)
MCV RBC AUTO: 80 FL — SIGNIFICANT CHANGE UP (ref 80–100)
NRBC # BLD: 0 /100 WBCS — SIGNIFICANT CHANGE UP (ref 0–0)
PHOSPHATE SERPL-MCNC: 2.8 MG/DL — SIGNIFICANT CHANGE UP (ref 2.5–4.5)
PLATELET # BLD AUTO: 126 K/UL — LOW (ref 150–400)
POTASSIUM SERPL-MCNC: 3.2 MMOL/L — LOW (ref 3.5–5.3)
POTASSIUM SERPL-SCNC: 3.2 MMOL/L — LOW (ref 3.5–5.3)
PROTHROM AB SERPL-ACNC: 12.6 SEC — SIGNIFICANT CHANGE UP (ref 10.6–13.6)
RBC # BLD: 4.25 M/UL — SIGNIFICANT CHANGE UP (ref 4.2–5.8)
RBC # FLD: 15.5 % — HIGH (ref 10.3–14.5)
SODIUM SERPL-SCNC: 135 MMOL/L — SIGNIFICANT CHANGE UP (ref 135–145)
WBC # BLD: 7.07 K/UL — SIGNIFICANT CHANGE UP (ref 3.8–10.5)
WBC # FLD AUTO: 7.07 K/UL — SIGNIFICANT CHANGE UP (ref 3.8–10.5)

## 2020-09-12 PROCEDURE — 99233 SBSQ HOSP IP/OBS HIGH 50: CPT

## 2020-09-12 RX ORDER — POTASSIUM CHLORIDE 20 MEQ
40 PACKET (EA) ORAL ONCE
Refills: 0 | Status: COMPLETED | OUTPATIENT
Start: 2020-09-12 | End: 2020-09-12

## 2020-09-12 RX ADMIN — Medication 81 MILLIGRAM(S): at 12:31

## 2020-09-12 RX ADMIN — Medication 4: at 17:19

## 2020-09-12 RX ADMIN — Medication 80 MILLIGRAM(S): at 06:00

## 2020-09-12 RX ADMIN — TAMSULOSIN HYDROCHLORIDE 0.4 MILLIGRAM(S): 0.4 CAPSULE ORAL at 21:23

## 2020-09-12 RX ADMIN — CHLORHEXIDINE GLUCONATE 1 APPLICATION(S): 213 SOLUTION TOPICAL at 07:00

## 2020-09-12 RX ADMIN — Medication 16 UNIT(S): at 08:00

## 2020-09-12 RX ADMIN — Medication 16 UNIT(S): at 12:30

## 2020-09-12 RX ADMIN — CEFTRIAXONE 100 MILLIGRAM(S): 500 INJECTION, POWDER, FOR SOLUTION INTRAMUSCULAR; INTRAVENOUS at 18:51

## 2020-09-12 RX ADMIN — Medication 4: at 12:30

## 2020-09-12 RX ADMIN — Medication 6: at 08:00

## 2020-09-12 RX ADMIN — Medication 25 MILLIGRAM(S): at 06:00

## 2020-09-12 RX ADMIN — Medication 650 MILLIGRAM(S): at 22:00

## 2020-09-12 RX ADMIN — Medication 1: at 21:24

## 2020-09-12 RX ADMIN — PANTOPRAZOLE SODIUM 40 MILLIGRAM(S): 20 TABLET, DELAYED RELEASE ORAL at 06:00

## 2020-09-12 RX ADMIN — LORATADINE 10 MILLIGRAM(S): 10 TABLET ORAL at 12:31

## 2020-09-12 RX ADMIN — ATORVASTATIN CALCIUM 80 MILLIGRAM(S): 80 TABLET, FILM COATED ORAL at 21:24

## 2020-09-12 RX ADMIN — Medication 650 MILLIGRAM(S): at 20:27

## 2020-09-12 RX ADMIN — Medication 40 MILLIEQUIVALENT(S): at 13:30

## 2020-09-12 RX ADMIN — Medication 25 MILLIGRAM(S): at 17:19

## 2020-09-12 RX ADMIN — CLOPIDOGREL BISULFATE 75 MILLIGRAM(S): 75 TABLET, FILM COATED ORAL at 12:31

## 2020-09-12 NOTE — PROGRESS NOTE ADULT - SUBJECTIVE AND OBJECTIVE BOX
24H hour events: feeling better     MEDICATIONS:  aspirin  chewable 81 milliGRAM(s) Oral daily  furosemide    Tablet 80 milliGRAM(s) Oral daily  metoprolol tartrate 25 milliGRAM(s) Oral two times a day  tamsulosin 0.4 milliGRAM(s) Oral at bedtime    cefTRIAXone   IVPB 2000 milliGRAM(s) IV Intermittent every 24 hours    ALBUTerol    90 MICROgram(s) HFA Inhaler 2 Puff(s) Inhalation every 12 hours PRN  loratadine 10 milliGRAM(s) Oral daily    acetaminophen   Tablet .. 650 milliGRAM(s) Oral every 6 hours PRN    pantoprazole    Tablet 40 milliGRAM(s) Oral before breakfast    atorvastatin 80 milliGRAM(s) Oral at bedtime  dextrose 40% Gel 15 Gram(s) Oral once PRN  dextrose 50% Injectable 12.5 Gram(s) IV Push once  dextrose 50% Injectable 25 Gram(s) IV Push once  dextrose 50% Injectable 25 Gram(s) IV Push once  glucagon  Injectable 1 milliGRAM(s) IntraMuscular once PRN  insulin glargine Injectable (LANTUS) 66 Unit(s) SubCutaneous at bedtime  insulin lispro (HumaLOG) corrective regimen sliding scale   SubCutaneous three times a day before meals  insulin lispro (HumaLOG) corrective regimen sliding scale   SubCutaneous at bedtime  insulin lispro Injectable (HumaLOG) 18 Unit(s) SubCutaneous three times a day before meals    chlorhexidine 4% Liquid 1 Application(s) Topical <User Schedule>  dextrose 5%. 1000 milliLiter(s) IV Continuous <Continuous>  influenza   Vaccine 0.5 milliLiter(s) IntraMuscular once      REVIEW OF SYSTEMS:  See HPI, otherwise ROS negative.    PHYSICAL EXAM:  T(C): 37 (09-12-20 @ 11:47), Max: 37 (09-11-20 @ 20:52)  HR: 96 (09-12-20 @ 11:47) (96 - 108)  BP: 109/72 (09-12-20 @ 11:47) (104/63 - 122/74)  RR: 19 (09-12-20 @ 11:47) (18 - 29)  SpO2: 97% (09-12-20 @ 11:47) (90% - 97%)  Wt(kg): --  I&O's Summary    11 Sep 2020 07:01  -  12 Sep 2020 07:00  --------------------------------------------------------  IN: 1595 mL / OUT: 2200 mL / NET: -605 mL    12 Sep 2020 07:01  -  12 Sep 2020 17:04  --------------------------------------------------------  IN: 0 mL / OUT: 1300 mL / NET: -1300 mL    Appearance: Alert. NAD	MO  Cardiovascular: +S1S2 RRR no m/g/r  Respiratory: CTA B/L	  Psychiatry: A & O x 3, Mood & affect appropriate  Gastrointestinal: obese  Soft, NT. ND. +BS	  Skin: left infraclavicular incision line flat no bleeding no hematoma 	  Neurologic: Non-focal  Extremities: No edema BLE  Vascular: Peripheral pulses palpable 2+ bilaterally  LABS:	 	    CBC Full  -  ( 12 Sep 2020 06:04 )  WBC Count : 7.07 K/uL  Hemoglobin : 11.1 g/dL  Hematocrit : 34.0 %  Platelet Count - Automated : 126 K/uL  Mean Cell Volume : 80.0 fl  Mean Cell Hemoglobin : 26.1 pg  Mean Cell Hemoglobin Concentration : 32.6 gm/dL  Auto Neutrophil # : x  Auto Lymphocyte # : x  Auto Monocyte # : x  Auto Eosinophil # : x  Auto Basophil # : x  Auto Neutrophil % : x  Auto Lymphocyte % : x  Auto Monocyte % : x  Auto Eosinophil % : x  Auto Basophil % : x    09-12    135  |  96  |  41<H>  ----------------------------<  246<H>  3.2<L>   |  25  |  1.63<H>  09-11    134<L>  |  94<L>  |  45<H>  ----------------------------<  264<H>  3.0<L>   |  25  |  2.04<H>    Ca    9.0      12 Sep 2020 06:04  Ca    9.2      11 Sep 2020 19:10  Phos  2.8     09-12  Phos  2.6     09-11  Mg     2.3     09-12  Mg     2.2     09-11    TPro  6.5  /  Alb  3.4  /  TBili  0.6  /  DBili  x   /  AST  27  /  ALT  34  /  AlkPhos  58  09-11      proBNP: Serum Pro-Brain Natriuretic Peptide: 1158 pg/mL (09-08 @ 20:42)    TELEMETRY:   SR 90 -100  	        RADIOLOGY:  < from: TTE with Doppler (w/Cont) (09.09.20 @ 05:44) >  ------------------------------------------------------------------------  Dimensions:    Normal Values:  LA:            2.0 - 4.0 cm  Ao:            2.0 - 3.8 cm  SEPTUM:        0.6 - 1.2 cm  PWT:           0.6 - 1.1 cm  LVIDd:     3.0 - 5.6 cm  LVIDs:         1.8 - 4.0 cm  EF (Visual Estimate): 15-20 %  ------------------------------------------------------------------------  Observations:  Mitral Valve: Tethered mitral valve leaflets with normal  opening-not well visualized. Minimal mitral regurgitation.  Aortic Valve/Aorta: Aortic valve not well visualized;  appears calcified. Peak left ventricular outflow tract  gradient equals 1 mm Hg, LVOT velocity time integral equals  8 cm.  Not well visualized.  Left Atrium: Left atrium not well visualized, probably  normal.  Left Ventricle: Endocardial visualization enhanced with  intravenous injection of Ultrasonic Enhancing Agent  (Definity).  Severe global left ventricular systolic  dysfunction.No obvious LV thrombus. Estimated EF of 15-20%.  No left ventricular thrombus. Left ventricular enlargement.  Severe diastolic dysfunction (Stage III).  Right Heart: A device wire is noted in the right heart. The  right ventricle is not well visualized. Tricuspid valve not  well visualized. No tricuspid regurgitation. Pulmonic valve  not well visualized.  Pericardium/Pleura: Normal pericardium with no pericardial  effusion.  Hemodynamic: Estimated right atrial pressure is 8 mm Hg.  ------------------------------------------------------------------------  Conclusions:  1. Aortic valve not well visualized; appears calcified.  2. Left ventricular enlargement.  3. Endocardial visualization enhanced with intravenous  injection of Ultrasonic Enhancing Agent (Definity).  Severe  global left ventricular systolic dysfunction.No obvious LV  thrombus. Estimated EF of 15-20%. No left ventricular  thrombus.  4. Severe diastolic dysfunction (Stage III).  5. A device wire is noted in the right heart.  6. Tricuspid valve not well visualized. No tricuspid  regurgitation.  7. Pulmonic valve not well visualized.  Unable to rule out endocarditis. Consider DONNIE if clinically  indicated.  ------------------------------------------------------------------------  Confirmed on  9/10/2020 - 11:09:29 by Raza Kraft M.D.  ------------------------------------------------------------------------        	  ASSESSMENT/PLAN:

## 2020-09-12 NOTE — CHART NOTE - NSCHARTNOTEFT_GEN_A_CORE
GI consulted for Strep Gallolyticus bacteremia source. Recommend patient to be off Plavix for 5 days for colonoscopy. Discussed with Cards - Dr. Looney - Ok to hold Plavix. Dr. Sow aware. Per GI ok to continue aspirin    [id number]

## 2020-09-12 NOTE — PROGRESS NOTE ADULT - ASSESSMENT
Assessment: 59M PMH T2DM, HTN, CAD/MI s/p stents (most recent 2/2018), ICM w/ HFrEF (10-15%) s/p AICD, COPD (not on home O2), HTN, GERD, BPH, who is admitted for sepsis 2/2 gram positive bacteremia. Admitted to MICU for hypotension yet did not require pressor.     # Hypotension secondary to Septic Shock  - resolved, off Midodrine   - continue furosemide, Metoprolol    Diuresing as needed    # HFrEF s/p AICD  - most recent echo from 12/2019: EF 10-15% sev global LV systolic dysfunction. eccentric LVH  - cardiology following   - resumed b-blocker but holding onto rest of HF meds.  - s/p AICD extraction in setting of Group D Strep Bacteremia  -Plan for AICD placement on Monday     # CAD s/p stent  - c/w home DAPT (ASA/PLV) and high dose statin.      GI/NUTRITION  - GI PCR negative.   - Continuing home pantoprazole po 40mg  - diabetic diet.  - GI consulted, recommend patient to be off Plavix for 5 days for colonoscopy, will follow up with cards.        Renal  - MANJIT improving -   - continuing home tamsulosin 0.4mg  - VOD trial   -Renal ultrasound       Hematologic  - Monitor CBC, currently stable    #DVT ppx  - SCDs for DVT ppx      # Strep Gallolyticus bacteremia  -Continue with Ceftriaxone   # Hyperglycemia   - Pt w/ hx of IDDM on home Lantus 66u qhs and Humalog 18 units pre meal   - Will monitor FSS with moderate ISS    Ethics  - GOC discussed Patient wants full code.

## 2020-09-12 NOTE — PROGRESS NOTE ADULT - SUBJECTIVE AND OBJECTIVE BOX
Chief complaint  Patient is a 59y old  Male who presents with a chief complaint of fever, cough, emesis (12 Sep 2020 16:11)   Review of systems  Patient in bed, appears comfortable.    Labs and Fingersticks  CAPILLARY BLOOD GLUCOSE      POCT Blood Glucose.: 227 mg/dL (12 Sep 2020 16:50)  POCT Blood Glucose.: 222 mg/dL (12 Sep 2020 12:07)  POCT Blood Glucose.: 262 mg/dL (12 Sep 2020 08:04)  POCT Blood Glucose.: 249 mg/dL (11 Sep 2020 20:54)      Anion Gap, Serum: 14 (09-12 @ 06:04)  Anion Gap, Serum: 15 (09-11 @ 19:10)  Anion Gap, Serum: 13 (09-11 @ 00:27)      Calcium, Total Serum: 9.0 (09-12 @ 06:04)  Calcium, Total Serum: 9.2 (09-11 @ 19:10)  Calcium, Total Serum: 8.9 (09-11 @ 00:27)  Albumin, Serum: 3.4 (09-11 @ 00:27)    Alanine Aminotransferase (ALT/SGPT): 34 (09-11 @ 00:27)  Alkaline Phosphatase, Serum: 58 (09-11 @ 00:27)  Aspartate Aminotransferase (AST/SGOT): 27 (09-11 @ 00:27)        09-12    135  |  96  |  41<H>  ----------------------------<  246<H>  3.2<L>   |  25  |  1.63<H>    Ca    9.0      12 Sep 2020 06:04  Phos  2.8     09-12  Mg     2.3     09-12    TPro  6.5  /  Alb  3.4  /  TBili  0.6  /  DBili  x   /  AST  27  /  ALT  34  /  AlkPhos  58  09-11                        11.1   7.07  )-----------( 126      ( 12 Sep 2020 06:04 )             34.0     Medications  MEDICATIONS  (STANDING):  aspirin  chewable 81 milliGRAM(s) Oral daily  atorvastatin 80 milliGRAM(s) Oral at bedtime  cefTRIAXone   IVPB 2000 milliGRAM(s) IV Intermittent every 24 hours  chlorhexidine 4% Liquid 1 Application(s) Topical <User Schedule>  dextrose 5%. 1000 milliLiter(s) (50 mL/Hr) IV Continuous <Continuous>  dextrose 50% Injectable 12.5 Gram(s) IV Push once  dextrose 50% Injectable 25 Gram(s) IV Push once  dextrose 50% Injectable 25 Gram(s) IV Push once  furosemide    Tablet 80 milliGRAM(s) Oral daily  influenza   Vaccine 0.5 milliLiter(s) IntraMuscular once  insulin glargine Injectable (LANTUS) 66 Unit(s) SubCutaneous at bedtime  insulin lispro (HumaLOG) corrective regimen sliding scale   SubCutaneous three times a day before meals  insulin lispro (HumaLOG) corrective regimen sliding scale   SubCutaneous at bedtime  insulin lispro Injectable (HumaLOG) 18 Unit(s) SubCutaneous three times a day before meals  loratadine 10 milliGRAM(s) Oral daily  metoprolol tartrate 25 milliGRAM(s) Oral two times a day  pantoprazole    Tablet 40 milliGRAM(s) Oral before breakfast  tamsulosin 0.4 milliGRAM(s) Oral at bedtime      Physical Exam  General: Patient comfortable in bed  Vital Signs Last 12 Hrs  T(F): 98.6 (09-12-20 @ 11:47), Max: 98.6 (09-12-20 @ 11:47)  HR: 80 (09-12-20 @ 17:00) (80 - 96)  BP: 120/71 (09-12-20 @ 17:00) (109/72 - 120/71)  BP(mean): --  RR: 19 (09-12-20 @ 17:00) (19 - 19)  SpO2: 97% (09-12-20 @ 17:00) (97% - 97%)  Neck: No palpable thyroid nodules.  CVS: S1S2, No murmurs  Respiratory: No wheezing, no crepitations  GI: Abdomen soft, bowel sounds positive  Musculoskeletal:  edema lower extremities.     Diagnostics

## 2020-09-12 NOTE — PROGRESS NOTE ADULT - ASSESSMENT
Assessment  DMT2: 59y Male with DM T2 with hyperglycemia, A1C 11.2%, on basal bolus insulin, blood sugars running high and not at target postprandially, no hypoglycemic episodes, he is eating full meals, ambulating.  Septic Shock: Hydronephrosis, On IV ABx, stable, monitored.  Obesity: No strict exercise routines, not on any weight loss program, neither on low calorie diet.          Cortney Flanagan MD  Cell: [phone]  Office: [phone]

## 2020-09-12 NOTE — PROGRESS NOTE ADULT - ASSESSMENT
Patient is a 59 year-old with known ischemic cardiomyopathy status post ICD ([employer], implanted in January 2012), presents with GI complaints, hypotension, found to have sepsis secondary to gram positive bacteremia of unclear source.  ICD explanted yesterday.  Patient now off IV pressor support.    ID consult for bacteremia. Continue antibiotics. Will follow-up DONNIE to evaluate possible lesion on mitral valve.  EP to evaluate risks/benefits of possible subcutaneous ICD implant vs. wearable defibrillator (LifeVest) prior to discharge.    Will slowly restart beta-blocker and Entresto as hemodynamics and renal function permit - patient now off midodrine.     Okay to hold clopidogrel for five days for GI to perform colonoscopy. Continue ASA without interruption.

## 2020-09-12 NOTE — PROGRESS NOTE ADULT - ASSESSMENT
59 year old morbidly obese male PMH COPD (not on home O2) T2DM, HTN, CAD/MI s/p stents (most recent 2/2018), ICM w/ HFrEF (10-15%) s/p primary prevention STJ VVI ICD, HTN, GERD, BPH, who was admitted for sepsis 2/2 gram positive bacteremia.  Admitted to MICU for hypotension, requiring pressors. now s/p ICD extraction 9/10    STJ ICD Fortify VR 1231-40Q ICD: implanted 1/282012   Durata 7121Q/65cm: Implanted 1/28/2012     1. ICM EF 10-15%  2. Streptococcus Bacteremia     s/p single chamber ICD 9/10/20 extraction   blood cultures 9/10 negative , continue to follow   continue antibiotics per ID  SC ICD screening done by representative   patient screened in for SC ICD   SC ICD likely implant Monday,  NPO after midnight Sunday for Monday  type and screen in     [phone]

## 2020-09-12 NOTE — PROGRESS NOTE ADULT - SUBJECTIVE AND OBJECTIVE BOX
HPI:  Patient seen and examined at bedside on 5 Suresh.    Review Of Systems:           Respiratory: No shortness of breath, cough, or wheezing  Cardiovascular: No chest pain or palpitations  10 point review of systems is otherwise negative except as mentioned above        Medications:  acetaminophen   Tablet .. 650 milliGRAM(s) Oral every 6 hours PRN  ALBUTerol    90 MICROgram(s) HFA Inhaler 2 Puff(s) Inhalation every 12 hours PRN  aspirin  chewable 81 milliGRAM(s) Oral daily  atorvastatin 80 milliGRAM(s) Oral at bedtime  cefTRIAXone   IVPB 2000 milliGRAM(s) IV Intermittent every 24 hours  chlorhexidine 4% Liquid 1 Application(s) Topical <User Schedule>  dextrose 40% Gel 15 Gram(s) Oral once PRN  dextrose 5%. 1000 milliLiter(s) IV Continuous <Continuous>  dextrose 50% Injectable 12.5 Gram(s) IV Push once  dextrose 50% Injectable 25 Gram(s) IV Push once  dextrose 50% Injectable 25 Gram(s) IV Push once  furosemide    Tablet 80 milliGRAM(s) Oral daily  glucagon  Injectable 1 milliGRAM(s) IntraMuscular once PRN  influenza   Vaccine 0.5 milliLiter(s) IntraMuscular once  insulin glargine Injectable (LANTUS) 66 Unit(s) SubCutaneous at bedtime  insulin lispro (HumaLOG) corrective regimen sliding scale   SubCutaneous three times a day before meals  insulin lispro (HumaLOG) corrective regimen sliding scale   SubCutaneous at bedtime  insulin lispro Injectable (HumaLOG) 18 Unit(s) SubCutaneous three times a day before meals  loratadine 10 milliGRAM(s) Oral daily  metoprolol tartrate 25 milliGRAM(s) Oral two times a day  pantoprazole    Tablet 40 milliGRAM(s) Oral before breakfast  tamsulosin 0.4 milliGRAM(s) Oral at bedtime    PAST MEDICAL & SURGICAL HISTORY:  H/O gastroesophageal reflux (GERD)    History of COPD    History of ischemic cardiomyopathy    Heart failure with reduced ejection fraction    Hypertension    AICD (automatic cardioverter/defibrillator) present    Diabetes    Stented coronary artery    H/O vasectomy  20 yrs ago (2000)      Vitals:  T(C): 37 (09-12-20 @ 11:47), Max: 37 (09-11-20 @ 20:52)  HR: 96 (09-12-20 @ 11:47) (96 - 108)  BP: 109/72 (09-12-20 @ 11:47) (104/63 - 122/74)  BP(mean): 78 (09-11-20 @ 19:38) (77 - 93)  RR: 19 (09-12-20 @ 11:47) (18 - 29)  SpO2: 97% (09-12-20 @ 11:47) (90% - 97%)  Wt(kg): --  Daily     Daily   I&O's Summary    11 Sep 2020 07:01  -  12 Sep 2020 07:00  --------------------------------------------------------  IN: 1595 mL / OUT: 2200 mL / NET: -605 mL    12 Sep 2020 07:01  -  12 Sep 2020 16:11  --------------------------------------------------------  IN: 0 mL / OUT: 1300 mL / NET: -1300 mL        Physical Exam:  Appearance: Normal, well groomed, NAD  Eyes: PERRLA, EOMI, pink conjunctiva, no scleral icterus   HENT: Normal oral mucosa  Cardiovascular: RRR, S1, S2, no murmur, rub, or gallop; +edema; +JVD  Procedural access site: clean, dry, intact without hematoma  Respiratory: diminished breath sounds throughout  Gastrointestinal: Soft, non-tender, non-distended, BS+  Musculoskeletal: No clubbing or joint deformity   Neurologic: No focal weakness  Lymphatic: No lymphadenopathy  Psychiatry: AAOx3 with appropriate mood and affect  Skin: No rashes, ecchymoses, or cyanosis; +tattoos                           11.1   7.07  )-----------( 126      ( 12 Sep 2020 06:04 )             34.0     09-12    135  |  96  |  41<H>  ----------------------------<  246<H>  3.2<L>   |  25  |  1.63<H>    Ca    9.0      12 Sep 2020 06:04  Phos  2.8     09-12  Mg     2.3     09-12    TPro  6.5  /  Alb  3.4  /  TBili  0.6  /  DBili  x   /  AST  27  /  ALT  34  /  AlkPhos  58  09-11    PT/INR - ( 12 Sep 2020 08:42 )   PT: 12.6 sec;   INR: 1.06 ratio         PTT - ( 12 Sep 2020 08:42 )  PTT:28.1 sec      Serum Pro-Brain Natriuretic Peptide: 1158 pg/mL (09-08 @ 20:42)      Interpretation of Telemetry: NSR

## 2020-09-12 NOTE — PROGRESS NOTE ADULT - SUBJECTIVE AND OBJECTIVE BOX
INTERVAL HPI/OVERNIGHT EVENTS:   s/p AICD extraction, patient tolerated procedure very well.   Patient ambulating well, no complaints     T(C): 37 (09-12-20 @ 11:47), Max: 37 (09-12-20 @ 11:47)  HR: 96 (09-12-20 @ 11:47) (96 - 96)  BP: 109/72 (09-12-20 @ 11:47) (109/72 - 109/72)  RR: 19 (09-12-20 @ 11:47) (19 - 19)  SpO2: 97% (09-12-20 @ 11:47) (97% - 97%)        MEDICATIONS  (STANDING):  aspirin  chewable 81 milliGRAM(s) Oral daily  atorvastatin 80 milliGRAM(s) Oral at bedtime  cefTRIAXone   IVPB 2000 milliGRAM(s) IV Intermittent every 24 hours  chlorhexidine 4% Liquid 1 Application(s) Topical <User Schedule>  clopidogrel Tablet 75 milliGRAM(s) Oral daily  dextrose 5%. 1000 milliLiter(s) (50 mL/Hr) IV Continuous <Continuous>  dextrose 50% Injectable 12.5 Gram(s) IV Push once  dextrose 50% Injectable 25 Gram(s) IV Push once  dextrose 50% Injectable 25 Gram(s) IV Push once  furosemide    Tablet 80 milliGRAM(s) Oral daily  influenza   Vaccine 0.5 milliLiter(s) IntraMuscular once  insulin glargine Injectable (LANTUS) 66 Unit(s) SubCutaneous at bedtime  insulin lispro (HumaLOG) corrective regimen sliding scale   SubCutaneous three times a day before meals  insulin lispro (HumaLOG) corrective regimen sliding scale   SubCutaneous at bedtime  insulin lispro Injectable (HumaLOG) 18 Unit(s) SubCutaneous three times a day before meals  loratadine 10 milliGRAM(s) Oral daily  metoprolol tartrate 25 milliGRAM(s) Oral two times a day  pantoprazole    Tablet 40 milliGRAM(s) Oral before breakfast  tamsulosin 0.4 milliGRAM(s) Oral at bedtime    MEDICATIONS  (PRN):  acetaminophen   Tablet .. 650 milliGRAM(s) Oral every 6 hours PRN Temp greater or equal to 38C (100.4F), Mild Pain (1 - 3)  ALBUTerol    90 MICROgram(s) HFA Inhaler 2 Puff(s) Inhalation every 12 hours PRN Shortness of Breath and/or Wheezing  dextrose 40% Gel 15 Gram(s) Oral once PRN Blood Glucose LESS THAN 70 milliGRAM(s)/deciliter  glucagon  Injectable 1 milliGRAM(s) IntraMuscular once PRN Glucose LESS THAN 70 milligrams/deciliter        OUT:    Indwelling Catheter - Urethral: 2305 mL    Voided: 50 mL  Total OUT: 2355 mL    Total NET: -1105 mL        PHYSICAL EXAM:    Constitutional: NAD. well-developed; well-groomed; well-nourished;  HEENT: AT/NC, PERRLA; EOMI, MMM, no oropharyngeal lesions, no erythema, no exudates,   Respiratory: CTAB. equal aeration bilaterally. no wheezing, no crackes, no rhonchi.   Cardiovascular: RRR, no M/R/G. no JVD  Gastrointestinal: soft; NT/ND, obese, +BS, no rebounding tenderness / guarding / HSM / mass / ascites.  Extremities: no clubbing; no cyanosis; 1+ LE edema to shins, non-tender to palpation, DP and Radial pulses intact.  Skin: warm and dry; color normal: no rash: no ulcers  Neurological: A&Ox 3; responds to pain; responds to verbal commands; CN nerves grossly intact.              `                      11.1   7.07  )-----------( 126      ( 12 Sep 2020 06:04 )             34.0           LIVER FUNCTIONS - ( 11 Sep 2020 00:27 )  Alb: 3.4 g/dL / Pro: 6.5 g/dL / ALK PHOS: 58 U/L / ALT: 34 U/L / AST: 27 U/L / GGT: x           PT/INR - ( 12 Sep 2020 08:42 )   PT: 12.6 sec;   INR: 1.06 ratio         PTT - ( 12 Sep 2020 08:42 )  PTT:28.1 sec  135|96|41<246  3.2|25|1.63  9.0,2.3,2.8  09-12 @ 06:04  134|94|45<264  3.0|25|2.04  9.2,2.2,2.6  09-11 @ 19:10    GI PCR Panel, Stool (collected 09 Sep 2020 10:21)  Source: .Stool Feces sarina garcia  Final Report (09 Sep 2020 12:58):    GI PCR Results: NOT detected    *******Please Note:*******    GI panel PCR evaluates for:    Campylobacter, Plesiomonas shigelloides, Salmonella,    Vibrio, Yersinia enterocolitica, Enteroaggregative    Escherichia coli (EAEC), Enteropathogenic E.coli (EPEC),    Enterotoxigenic E. coli (ETEC) lt/st, Shiga-like    toxin-producing E. coli (STEC) stx1/stx2,    Shigella/ Enteroinvasive E. coli (EIEC), Cryptosporidium,    Cyclospora cayetanensis, Entamoeba histolytica,    Giardia lamblia, Adenovirus F 40/41, Astrovirus,    Norovirus GI/GII, Rotavirus A, Sapovirus    Culture - Stool (09.09.20 @ 10:21)    Specimen Source: .Stool Feces  sarina jose    Culture Results:   No enteric pathogens to date: Final culture pending    Culture - Blood (09.09.20 @ 00:57)    Gram Stain:   Growth in aerobic and anaerobic bottles: Gram Positive Cocci in Pairs and  Chains    Specimen Source: .Blood Blood-Peripheral    Culture Results:   Growth in aerobic and anaerobic bottles: Streptococcus gallolyticus  See previous culture [id number]        IMAGING:      < from: CT Chest No Cont (09.09.20 @ 01:27) >  IMPRESSION:  No pneumonia.    Emphysema.    No bowel or obstruction.    Hepatomegaly and diffuse hepatic steatosis.    Splenomegaly.    Atrophic right kidney, with severe hydronephrosis. A 0.7 cm soft tissue density is noted in the interpolar region of the right kidney and is incompletely characterized. Ultrasound may be helpful for further evaluation.    < end of copied text >

## 2020-09-13 LAB
ANION GAP SERPL CALC-SCNC: 14 MMOL/L — SIGNIFICANT CHANGE UP (ref 5–17)
BUN SERPL-MCNC: 42 MG/DL — HIGH (ref 7–23)
CALCIUM SERPL-MCNC: 9.7 MG/DL — SIGNIFICANT CHANGE UP (ref 8.4–10.5)
CHLORIDE SERPL-SCNC: 97 MMOL/L — SIGNIFICANT CHANGE UP (ref 96–108)
CO2 SERPL-SCNC: 24 MMOL/L — SIGNIFICANT CHANGE UP (ref 22–31)
CREAT SERPL-MCNC: 1.58 MG/DL — HIGH (ref 0.5–1.3)
GLUCOSE BLDC GLUCOMTR-MCNC: 265 MG/DL — HIGH (ref 70–99)
GLUCOSE BLDC GLUCOMTR-MCNC: 279 MG/DL — HIGH (ref 70–99)
GLUCOSE BLDC GLUCOMTR-MCNC: 289 MG/DL — HIGH (ref 70–99)
GLUCOSE BLDC GLUCOMTR-MCNC: 314 MG/DL — HIGH (ref 70–99)
GLUCOSE SERPL-MCNC: 340 MG/DL — HIGH (ref 70–99)
HCT VFR BLD CALC: 34.1 % — LOW (ref 39–50)
HGB BLD-MCNC: 11 G/DL — LOW (ref 13–17)
MAGNESIUM SERPL-MCNC: 2.2 MG/DL — SIGNIFICANT CHANGE UP (ref 1.6–2.6)
MCHC RBC-ENTMCNC: 25.9 PG — LOW (ref 27–34)
MCHC RBC-ENTMCNC: 32.3 GM/DL — SIGNIFICANT CHANGE UP (ref 32–36)
MCV RBC AUTO: 80.2 FL — SIGNIFICANT CHANGE UP (ref 80–100)
NRBC # BLD: 0 /100 WBCS — SIGNIFICANT CHANGE UP (ref 0–0)
PLATELET # BLD AUTO: 152 K/UL — SIGNIFICANT CHANGE UP (ref 150–400)
POTASSIUM SERPL-MCNC: 3.6 MMOL/L — SIGNIFICANT CHANGE UP (ref 3.5–5.3)
POTASSIUM SERPL-SCNC: 3.6 MMOL/L — SIGNIFICANT CHANGE UP (ref 3.5–5.3)
RBC # BLD: 4.25 M/UL — SIGNIFICANT CHANGE UP (ref 4.2–5.8)
RBC # FLD: 15.5 % — HIGH (ref 10.3–14.5)
SODIUM SERPL-SCNC: 135 MMOL/L — SIGNIFICANT CHANGE UP (ref 135–145)
WBC # BLD: 6.15 K/UL — SIGNIFICANT CHANGE UP (ref 3.8–10.5)
WBC # FLD AUTO: 6.15 K/UL — SIGNIFICANT CHANGE UP (ref 3.8–10.5)

## 2020-09-13 PROCEDURE — 99233 SBSQ HOSP IP/OBS HIGH 50: CPT

## 2020-09-13 PROCEDURE — 99232 SBSQ HOSP IP/OBS MODERATE 35: CPT

## 2020-09-13 RX ORDER — INSULIN LISPRO 100/ML
22 VIAL (ML) SUBCUTANEOUS
Refills: 0 | Status: DISCONTINUED | OUTPATIENT
Start: 2020-09-13 | End: 2020-09-13

## 2020-09-13 RX ORDER — INSULIN GLARGINE 100 [IU]/ML
75 INJECTION, SOLUTION SUBCUTANEOUS AT BEDTIME
Refills: 0 | Status: DISCONTINUED | OUTPATIENT
Start: 2020-09-13 | End: 2020-09-16

## 2020-09-13 RX ORDER — INSULIN LISPRO 100/ML
26 VIAL (ML) SUBCUTANEOUS
Refills: 0 | Status: DISCONTINUED | OUTPATIENT
Start: 2020-09-13 | End: 2020-09-14

## 2020-09-13 RX ORDER — METOPROLOL TARTRATE 50 MG
50 TABLET ORAL EVERY 12 HOURS
Refills: 0 | Status: DISCONTINUED | OUTPATIENT
Start: 2020-09-13 | End: 2020-09-21

## 2020-09-13 RX ORDER — BENZOCAINE AND MENTHOL 5; 1 G/100ML; G/100ML
1 LIQUID ORAL ONCE
Refills: 0 | Status: COMPLETED | OUTPATIENT
Start: 2020-09-13 | End: 2020-09-13

## 2020-09-13 RX ADMIN — INSULIN GLARGINE 75 UNIT(S): 100 INJECTION, SOLUTION SUBCUTANEOUS at 21:42

## 2020-09-13 RX ADMIN — Medication 81 MILLIGRAM(S): at 11:56

## 2020-09-13 RX ADMIN — CEFTRIAXONE 100 MILLIGRAM(S): 500 INJECTION, POWDER, FOR SOLUTION INTRAMUSCULAR; INTRAVENOUS at 17:42

## 2020-09-13 RX ADMIN — Medication 80 MILLIGRAM(S): at 08:17

## 2020-09-13 RX ADMIN — Medication 1: at 21:43

## 2020-09-13 RX ADMIN — Medication 650 MILLIGRAM(S): at 23:18

## 2020-09-13 RX ADMIN — BENZOCAINE AND MENTHOL 1 LOZENGE: 5; 1 LIQUID ORAL at 23:45

## 2020-09-13 RX ADMIN — ALBUTEROL 2 PUFF(S): 90 AEROSOL, METERED ORAL at 05:36

## 2020-09-13 RX ADMIN — Medication 8: at 08:16

## 2020-09-13 RX ADMIN — Medication 22 UNIT(S): at 11:56

## 2020-09-13 RX ADMIN — ATORVASTATIN CALCIUM 80 MILLIGRAM(S): 80 TABLET, FILM COATED ORAL at 21:42

## 2020-09-13 RX ADMIN — Medication 26 UNIT(S): at 17:11

## 2020-09-13 RX ADMIN — Medication 50 MILLIGRAM(S): at 17:41

## 2020-09-13 RX ADMIN — TAMSULOSIN HYDROCHLORIDE 0.4 MILLIGRAM(S): 0.4 CAPSULE ORAL at 21:42

## 2020-09-13 RX ADMIN — Medication 650 MILLIGRAM(S): at 21:43

## 2020-09-13 RX ADMIN — Medication 25 MILLIGRAM(S): at 05:38

## 2020-09-13 RX ADMIN — CHLORHEXIDINE GLUCONATE 1 APPLICATION(S): 213 SOLUTION TOPICAL at 09:59

## 2020-09-13 RX ADMIN — Medication 6: at 11:55

## 2020-09-13 RX ADMIN — LORATADINE 10 MILLIGRAM(S): 10 TABLET ORAL at 11:56

## 2020-09-13 RX ADMIN — Medication 6: at 17:11

## 2020-09-13 NOTE — PROGRESS NOTE ADULT - ASSESSMENT
ASSESSMENT/RECOMMENDATIONS:  59M PMH T2DM(a1c=11.2%), HTN, CAD/MI s/p stents (most recent 2/2018), HFrEF (10-15%), ICD, ischemic cardiomyopathy, COPD, HTN, GERD pw fever, n/v, back pain x 1 day. Pt states that he was in his baseline state of health when he began to experience back pain while grocery shopping yesterday. He describes sudden onset left lower back pain, palliated by sitting upright, of sharp quality, without radiation, of 7/10 severity, which completely resolved when presenting to ED.     VS: febrile 102.9, HR , BP 63/40 to 105/62, RR 17-25, SpO2%  on RA  Labs: significant for WBC 10.28, thrombocytopenia 146, elevated BUN/SCr 32/1.88, hyperglycemic 247, elevated proBNP 1158, lactate wnl  Micro: COVID-19 negative, RVP negative, UA 0 WBC and negative for bacteria. GI PCR negative. BCx: Strep by PCR.  CT: No pneumonia. Emphysema. No bowel or obstruction. Hepatomegaly and diffuse hepatic steatosis. Splenomegaly. Atrophic right kidney, with severe hydronephrosis. A 0.7 cm soft tissue density is noted in the interpolar region of the right kidney and is incompletely characterized. Ultrasound may be helpful for further evaluation.  Pt admitted to MICU for management of hypotension requiring pressor support. While in ICU experienced emesis and diarrhea (without melena/hematochezia) x 1 with SOB.     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    Overall bacteremia, strep infection.       Plan:   continue Rocephin at current dose.   GI on board, plan for colonoscopy Wednesday   will discuss with EPS about the timing of a new AICD  follow up blood cultures so far NGTD  ICD lead cx NTD   Cr downtrending

## 2020-09-13 NOTE — PROGRESS NOTE ADULT - SUBJECTIVE AND OBJECTIVE BOX
Chief complaint  Patient is a 59y old  Male who presents with a chief complaint of fever, cough, emesis (13 Sep 2020 12:48)   Review of systems  Patient in bed, looks comfortable, no hypoglycemic episodes.    Labs and Fingersticks  CAPILLARY BLOOD GLUCOSE      POCT Blood Glucose.: 289 mg/dL (13 Sep 2020 11:49)  POCT Blood Glucose.: 314 mg/dL (13 Sep 2020 07:52)  POCT Blood Glucose.: 261 mg/dL (12 Sep 2020 21:06)  POCT Blood Glucose.: 227 mg/dL (12 Sep 2020 16:50)    Medications  MEDICATIONS  (STANDING):  aspirin  chewable 81 milliGRAM(s) Oral daily  atorvastatin 80 milliGRAM(s) Oral at bedtime  cefTRIAXone   IVPB 2000 milliGRAM(s) IV Intermittent every 24 hours  chlorhexidine 4% Liquid 1 Application(s) Topical <User Schedule>  dextrose 5%. 1000 milliLiter(s) (50 mL/Hr) IV Continuous <Continuous>  dextrose 50% Injectable 12.5 Gram(s) IV Push once  dextrose 50% Injectable 25 Gram(s) IV Push once  dextrose 50% Injectable 25 Gram(s) IV Push once  furosemide    Tablet 80 milliGRAM(s) Oral daily  influenza   Vaccine 0.5 milliLiter(s) IntraMuscular once  insulin glargine Injectable (LANTUS) 75 Unit(s) SubCutaneous at bedtime  insulin lispro (HumaLOG) corrective regimen sliding scale   SubCutaneous three times a day before meals  insulin lispro (HumaLOG) corrective regimen sliding scale   SubCutaneous at bedtime  insulin lispro Injectable (HumaLOG) 26 Unit(s) SubCutaneous three times a day before meals  loratadine 10 milliGRAM(s) Oral daily  metoprolol tartrate 25 milliGRAM(s) Oral two times a day  pantoprazole    Tablet 40 milliGRAM(s) Oral before breakfast  tamsulosin 0.4 milliGRAM(s) Oral at bedtime      Physical Exam  General: Patient comfortable in bed  Vital Signs Last 12 Hrs  T(F): 97.6 (09-13-20 @ 11:37), Max: 98.2 (09-13-20 @ 04:09)  HR: 99 (09-13-20 @ 11:37) (88 - 99)  BP: 117/77 (09-13-20 @ 11:37) (112/70 - 123/82)  BP(mean): --  RR: 18 (09-13-20 @ 11:37) (18 - 20)  SpO2: 96% (09-13-20 @ 11:37) (93% - 97%)  Neck: No palpable thyroid nodules.           Chief complaint  Patient is a 59y old  Male who presents with a chief complaint of fever, cough, emesis (13 Sep 2020 12:48)   Review of systems  Patient in bed, looks comfortable, no hypoglycemic episodes.    Labs and Fingersticks  CAPILLARY BLOOD GLUCOSE      POCT Blood Glucose.: 289 mg/dL (13 Sep 2020 11:49)  POCT Blood Glucose.: 314 mg/dL (13 Sep 2020 07:52)  POCT Blood Glucose.: 261 mg/dL (12 Sep 2020 21:06)  POCT Blood Glucose.: 227 mg/dL (12 Sep 2020 16:50)    Medications  MEDICATIONS  (STANDING):  aspirin  chewable 81 milliGRAM(s) Oral daily  atorvastatin 80 milliGRAM(s) Oral at bedtime  cefTRIAXone   IVPB 2000 milliGRAM(s) IV Intermittent every 24 hours  chlorhexidine 4% Liquid 1 Application(s) Topical <User Schedule>  dextrose 5%. 1000 milliLiter(s) (50 mL/Hr) IV Continuous <Continuous>  dextrose 50% Injectable 12.5 Gram(s) IV Push once  dextrose 50% Injectable 25 Gram(s) IV Push once  dextrose 50% Injectable 25 Gram(s) IV Push once  furosemide    Tablet 80 milliGRAM(s) Oral daily  influenza   Vaccine 0.5 milliLiter(s) IntraMuscular once  insulin glargine Injectable (LANTUS) 75 Unit(s) SubCutaneous at bedtime  insulin lispro (HumaLOG) corrective regimen sliding scale   SubCutaneous three times a day before meals  insulin lispro (HumaLOG) corrective regimen sliding scale   SubCutaneous at bedtime  insulin lispro Injectable (HumaLOG) 26 Unit(s) SubCutaneous three times a day before meals  loratadine 10 milliGRAM(s) Oral daily  metoprolol tartrate 25 milliGRAM(s) Oral two times a day  pantoprazole    Tablet 40 milliGRAM(s) Oral before breakfast  tamsulosin 0.4 milliGRAM(s) Oral at bedtime      Physical Exam  General: Patient comfortable in bed  Vital Signs Last 12 Hrs  T(F): 97.6 (09-13-20 @ 11:37), Max: 98.2 (09-13-20 @ 04:09)  HR: 99 (09-13-20 @ 11:37) (88 - 99)  BP: 117/77 (09-13-20 @ 11:37) (112/70 - 123/82)  BP(mean): --  RR: 18 (09-13-20 @ 11:37) (18 - 20)  SpO2: 96% (09-13-20 @ 11:37) (93% - 97%)  Neck: No palpable thyroid nodules.

## 2020-09-13 NOTE — PROGRESS NOTE ADULT - SUBJECTIVE AND OBJECTIVE BOX
INTERVAL HPI/OVERNIGHT EVENTS:   s/p AICD extraction, patient tolerated procedure very well.   Patient ambulating well, no complaints     T(C): 36.8 (09-13-20 @ 20:51), Max: 37 (09-13-20 @ 16:06)  HR: 81 (09-13-20 @ 20:51) (81 - 101)  BP: 110/57 (09-13-20 @ 20:51) (110/57 - 124/74)  RR: 18 (09-13-20 @ 20:51) (18 - 18)  SpO2: 92% (09-13-20 @ 20:51) (92% - 98%)        MEDICATIONS  (STANDING):  aspirin  chewable 81 milliGRAM(s) Oral daily  atorvastatin 80 milliGRAM(s) Oral at bedtime  cefTRIAXone   IVPB 2000 milliGRAM(s) IV Intermittent every 24 hours  chlorhexidine 4% Liquid 1 Application(s) Topical <User Schedule>  dextrose 5%. 1000 milliLiter(s) (50 mL/Hr) IV Continuous <Continuous>  dextrose 50% Injectable 12.5 Gram(s) IV Push once  dextrose 50% Injectable 25 Gram(s) IV Push once  dextrose 50% Injectable 25 Gram(s) IV Push once  furosemide    Tablet 80 milliGRAM(s) Oral daily  influenza   Vaccine 0.5 milliLiter(s) IntraMuscular once  insulin glargine Injectable (LANTUS) 75 Unit(s) SubCutaneous at bedtime  insulin lispro (HumaLOG) corrective regimen sliding scale   SubCutaneous three times a day before meals  insulin lispro (HumaLOG) corrective regimen sliding scale   SubCutaneous at bedtime  insulin lispro Injectable (HumaLOG) 26 Unit(s) SubCutaneous three times a day before meals  loratadine 10 milliGRAM(s) Oral daily  metoprolol succinate ER 50 milliGRAM(s) Oral every 12 hours  pantoprazole    Tablet 40 milliGRAM(s) Oral before breakfast  tamsulosin 0.4 milliGRAM(s) Oral at bedtime    MEDICATIONS  (PRN):  acetaminophen   Tablet .. 650 milliGRAM(s) Oral every 6 hours PRN Temp greater or equal to 38C (100.4F), Mild Pain (1 - 3)  ALBUTerol    90 MICROgram(s) HFA Inhaler 2 Puff(s) Inhalation every 12 hours PRN Shortness of Breath and/or Wheezing  dextrose 40% Gel 15 Gram(s) Oral once PRN Blood Glucose LESS THAN 70 milliGRAM(s)/deciliter  glucagon  Injectable 1 milliGRAM(s) IntraMuscular once PRN Glucose LESS THAN 70 milligrams/deciliter    OUT:    Indwelling Catheter - Urethral: 2305 mL    Voided: 50 mL  Total OUT: 2355 mL    Total NET: -1105 mL        PHYSICAL EXAM:    Constitutional: NAD. well-developed; well-groomed; well-nourished;  HEENT: AT/NC, PERRLA; EOMI, MMM, no oropharyngeal lesions, no erythema, no exudates,   Respiratory: CTAB. equal aeration bilaterally. no wheezing, no crackes, no rhonchi.   Cardiovascular: RRR, no M/R/G. no JVD  Gastrointestinal: soft; NT/ND, obese, +BS, no rebounding tenderness / guarding / HSM / mass / ascites.  Extremities: no clubbing; no cyanosis; 1+ LE edema to shins, non-tender to palpation, DP and Radial pulses intact.  Skin: warm and dry; color normal: no rash: no ulcers  Neurological: A&Ox 3; responds to pain; responds to verbal commands; CN nerves grossly intact.              `                                    11.0   6.15  )-----------( 152      ( 13 Sep 2020 07:12 )             34.1             PT/INR - ( 12 Sep 2020 08:42 )   PT: 12.6 sec;   INR: 1.06 ratio         PTT - ( 12 Sep 2020 08:42 )  PTT:28.1 sec  135|97|42<340  3.6|24|1.58  9.7,2.2,--  09-13 @ 07:12  GI PCR Panel, Stool (collected 09 Sep 2020 10:21)  Source: .Stool Feces sarina garcia  Final Report (09 Sep 2020 12:58):    GI PCR Results: NOT detected    *******Please Note:*******    GI panel PCR evaluates for:    Campylobacter, Plesiomonas shigelloides, Salmonella,    Vibrio, Yersinia enterocolitica, Enteroaggregative    Escherichia coli (EAEC), Enteropathogenic E.coli (EPEC),    Enterotoxigenic E. coli (ETEC) lt/st, Shiga-like    toxin-producing E. coli (STEC) stx1/stx2,    Shigella/ Enteroinvasive E. coli (EIEC), Cryptosporidium,    Cyclospora cayetanensis, Entamoeba histolytica,    Giardia lamblia, Adenovirus F 40/41, Astrovirus,    Norovirus GI/GII, Rotavirus A, Sapovirus    Culture - Stool (09.09.20 @ 10:21)    Specimen Source: .Stool Feces  sarina jose    Culture Results:   No enteric pathogens to date: Final culture pending    Culture - Blood (09.09.20 @ 00:57)    Gram Stain:   Growth in aerobic and anaerobic bottles: Gram Positive Cocci in Pairs and  Chains    Specimen Source: .Blood Blood-Peripheral    Culture Results:   Growth in aerobic and anaerobic bottles: Streptococcus gallolyticus  See previous culture [id number]        IMAGING:      < from: CT Chest No Cont (09.09.20 @ 01:27) >  IMPRESSION:  No pneumonia.    Emphysema.    No bowel or obstruction.    Hepatomegaly and diffuse hepatic steatosis.    Splenomegaly.    Atrophic right kidney, with severe hydronephrosis. A 0.7 cm soft tissue density is noted in the interpolar region of the right kidney and is incompletely characterized. Ultrasound may be helpful for further evaluation.    < end of copied text >

## 2020-09-13 NOTE — PROGRESS NOTE ADULT - SUBJECTIVE AND OBJECTIVE BOX
HPI:  Patient seen and examined at bedside on 4 Suresh.    Review Of Systems:           Respiratory: No shortness of breath, cough, or wheezing  Cardiovascular: No chest pain or palpitations  10 point review of systems is otherwise negative except as mentioned above        Medications:  acetaminophen   Tablet .. 650 milliGRAM(s) Oral every 6 hours PRN  ALBUTerol    90 MICROgram(s) HFA Inhaler 2 Puff(s) Inhalation every 12 hours PRN  aspirin  chewable 81 milliGRAM(s) Oral daily  atorvastatin 80 milliGRAM(s) Oral at bedtime  cefTRIAXone   IVPB 2000 milliGRAM(s) IV Intermittent every 24 hours  chlorhexidine 4% Liquid 1 Application(s) Topical <User Schedule>  dextrose 40% Gel 15 Gram(s) Oral once PRN  dextrose 5%. 1000 milliLiter(s) IV Continuous <Continuous>  dextrose 50% Injectable 12.5 Gram(s) IV Push once  dextrose 50% Injectable 25 Gram(s) IV Push once  dextrose 50% Injectable 25 Gram(s) IV Push once  furosemide    Tablet 80 milliGRAM(s) Oral daily  glucagon  Injectable 1 milliGRAM(s) IntraMuscular once PRN  influenza   Vaccine 0.5 milliLiter(s) IntraMuscular once  insulin glargine Injectable (LANTUS) 75 Unit(s) SubCutaneous at bedtime  insulin lispro (HumaLOG) corrective regimen sliding scale   SubCutaneous three times a day before meals  insulin lispro (HumaLOG) corrective regimen sliding scale   SubCutaneous at bedtime  insulin lispro Injectable (HumaLOG) 26 Unit(s) SubCutaneous three times a day before meals  loratadine 10 milliGRAM(s) Oral daily  metoprolol succinate ER 50 milliGRAM(s) Oral every 12 hours  pantoprazole    Tablet 40 milliGRAM(s) Oral before breakfast  tamsulosin 0.4 milliGRAM(s) Oral at bedtime    PAST MEDICAL & SURGICAL HISTORY:  H/O gastroesophageal reflux (GERD)    History of COPD    History of ischemic cardiomyopathy    Heart failure with reduced ejection fraction    Hypertension    AICD (automatic cardioverter/defibrillator) present    Diabetes    Stented coronary artery    H/O vasectomy  20 yrs ago ()      Vitals:  T(C): 36.4 (20 @ 11:37), Max: 36.9 (20 @ 20:59)  HR: 99 (20 @ 11:37) (80 - 99)  BP: 117/77 (20 @ 11:37) (105/70 - 124/77)  BP(mean): --  RR: 18 (20 @ 11:37) (18 - 20)  SpO2: 96% (20 @ 11:37) (93% - 97%)  Wt(kg): --  Daily     Daily Weight in k.9 (13 Sep 2020 09:48)  I&O's Summary    12 Sep 2020 07:  -  13 Sep 2020 07:00  --------------------------------------------------------  IN: 600 mL / OUT: 3050 mL / NET: -2450 mL    13 Sep 2020 07:  -  13 Sep 2020 15:00  --------------------------------------------------------  IN: 0 mL / OUT: 800 mL / NET: -800 mL        Physical Exam:  sAppearance: Normal, well groomed, NAD  Eyes: PERRLA, EOMI, pink conjunctiva, no scleral icterus   HENT: Normal oral mucosa  Cardiovascular: RRR, S1, S2, no murmur, rub, or gallop; +edema; +JVD  Procedural access site: clean, dry, intact without hematoma  Respiratory: diminished breath sounds throughout  Gastrointestinal: Soft, non-tender, non-distended, BS+  Musculoskeletal: No clubbing or joint deformity   Neurologic: No focal weakness  Lymphatic: No lymphadenopathy  Psychiatry: AAOx3 with appropriate mood and affect  Skin: No rashes, ecchymoses, or cyanosis; +tattoos                           11.0   6.15  )-----------( 152      ( 13 Sep 2020 07:12 )             34.1         135  |  97  |  42<H>  ----------------------------<  340<H>  3.6   |  24  |  1.58<H>    Ca    9.7      13 Sep 2020 07:12  Phos  2.8     09-12  Mg     2.2     09-13      PT/INR - ( 12 Sep 2020 08:42 )   PT: 12.6 sec;   INR: 1.06 ratio         PTT - ( 12 Sep 2020 08:42 )  PTT:28.1 sec      Serum Pro-Brain Natriuretic Peptide: 1158 pg/mL ( @ 20:42)      Interpretation of Telemetry:  NSR

## 2020-09-13 NOTE — PROGRESS NOTE ADULT - ASSESSMENT
Assessment  DMT2: 59y Male with DM T2 with hyperglycemia, A1C 11.2%, on basal bolus insulin, increased dose yesterday, blood sugars still running high and not at target, no hypoglycemic episodes, eating meals, appears comfortable.  Septic Shock: Hydronephrosis, On IV ABx, stable, monitored.  Obesity: No strict exercise routines, not on any weight loss program, neither on low calorie diet.          Cortney Flanagan MD  Cell: [phone]  Office: [phone]               Assessment  DMT2: 59y Male with DM T2 with hyperglycemia, A1C 11.2%, on basal bolus insulin, increased dose yesterday, blood sugars still running high and not at target,  no hypoglycemic episodes, eating meals, appears comfortable.  Septic Shock: Hydronephrosis, On IV ABx, stable, monitored.  Obesity: No strict exercise routines, not on any weight loss program, neither on low calorie diet.          Cortney Flanagan MD  Cell: [phone]  Office: [phone]

## 2020-09-13 NOTE — PROGRESS NOTE ADULT - SUBJECTIVE AND OBJECTIVE BOX
59y old  Male who presents with a chief complaint of fever, cough, emesis (12 Sep 2020 19:33)      Interval history:  Afebrile, no abdominal pain, feels well.       Allergies:   No Known Allergies      Antimicrobials:  cefTRIAXone   IVPB 2000 milliGRAM(s) IV Intermittent every 24 hours      REVIEW OF SYSTEMS:  No chest pain   No SOB  No N/V  No dysuria   No rash.       Vital Signs Last 24 Hrs  T(C): 36.4 (09-13-20 @ 11:37), Max: 36.9 (09-12-20 @ 20:59)  T(F): 97.6 (09-13-20 @ 11:37), Max: 98.5 (09-13-20 @ 00:08)  HR: 99 (09-13-20 @ 11:37) (80 - 99)  BP: 117/77 (09-13-20 @ 11:37) (105/70 - 124/77)  BP(mean): --  RR: 18 (09-13-20 @ 11:37) (18 - 20)  SpO2: 96% (09-13-20 @ 11:37) (93% - 97%)      PHYSICAL EXAM:  Patient in no acute distress. AAOX3.  Cardiovascular: S1S2 normal.  Lungs: + air entry B/L lung fields.  Gastrointestinal: soft, nontender, nondistended.  Extremities: no edema.  IV sites not inflamed.                           11.0   6.15  )-----------( 152      ( 13 Sep 2020 07:12 )             34.1   09-13    135  |  97  |  42<H>  ----------------------------<  340<H>  3.6   |  24  |  1.58<H>    Ca    9.7      13 Sep 2020 07:12  Phos  2.8     09-12  Mg     2.2     09-13      Culture - Surgical Swab (collected 10 Sep 2020 17:07)  Source: .Surgical Swab icd lead  Preliminary Report (11 Sep 2020 16:08):    No growth    Culture - Urine (collected 10 Sep 2020 16:58)  Source: .Urine Bladder (from O.R.)  Final Report (11 Sep 2020 13:08):    No growth

## 2020-09-13 NOTE — CHART NOTE - NSCHARTNOTEFT_GEN_A_CORE
Toprol XL 50mg PO BID ordered as per Cardiologist Dr. Looney. Will continue monitor pt closely. VSS.     Peewee Borjas  Spectra [phone]

## 2020-09-14 LAB
ANION GAP SERPL CALC-SCNC: 16 MMOL/L — SIGNIFICANT CHANGE UP (ref 5–17)
APTT BLD: 32.1 SEC — SIGNIFICANT CHANGE UP (ref 27.5–35.5)
BLD GP AB SCN SERPL QL: NEGATIVE — SIGNIFICANT CHANGE UP
BUN SERPL-MCNC: 38 MG/DL — HIGH (ref 7–23)
CALCIUM SERPL-MCNC: 10 MG/DL — SIGNIFICANT CHANGE UP (ref 8.4–10.5)
CHLORIDE SERPL-SCNC: 98 MMOL/L — SIGNIFICANT CHANGE UP (ref 96–108)
CO2 SERPL-SCNC: 26 MMOL/L — SIGNIFICANT CHANGE UP (ref 22–31)
CREAT SERPL-MCNC: 1.67 MG/DL — HIGH (ref 0.5–1.3)
CULTURE RESULTS: SIGNIFICANT CHANGE UP
GLUCOSE BLDC GLUCOMTR-MCNC: 127 MG/DL — HIGH (ref 70–99)
GLUCOSE BLDC GLUCOMTR-MCNC: 250 MG/DL — HIGH (ref 70–99)
GLUCOSE BLDC GLUCOMTR-MCNC: 252 MG/DL — HIGH (ref 70–99)
GLUCOSE BLDC GLUCOMTR-MCNC: 252 MG/DL — HIGH (ref 70–99)
GLUCOSE SERPL-MCNC: 229 MG/DL — HIGH (ref 70–99)
HCT VFR BLD CALC: 34.7 % — LOW (ref 39–50)
HGB BLD-MCNC: 11.1 G/DL — LOW (ref 13–17)
INR BLD: 1.09 RATIO — SIGNIFICANT CHANGE UP (ref 0.88–1.16)
MCHC RBC-ENTMCNC: 25.8 PG — LOW (ref 27–34)
MCHC RBC-ENTMCNC: 32 GM/DL — SIGNIFICANT CHANGE UP (ref 32–36)
MCV RBC AUTO: 80.7 FL — SIGNIFICANT CHANGE UP (ref 80–100)
NRBC # BLD: 0 /100 WBCS — SIGNIFICANT CHANGE UP (ref 0–0)
PLATELET # BLD AUTO: 227 K/UL — SIGNIFICANT CHANGE UP (ref 150–400)
POTASSIUM SERPL-MCNC: 3.5 MMOL/L — SIGNIFICANT CHANGE UP (ref 3.5–5.3)
POTASSIUM SERPL-SCNC: 3.5 MMOL/L — SIGNIFICANT CHANGE UP (ref 3.5–5.3)
PROTHROM AB SERPL-ACNC: 12.9 SEC — SIGNIFICANT CHANGE UP (ref 10.6–13.6)
RBC # BLD: 4.3 M/UL — SIGNIFICANT CHANGE UP (ref 4.2–5.8)
RBC # FLD: 15.4 % — HIGH (ref 10.3–14.5)
RH IG SCN BLD-IMP: POSITIVE — SIGNIFICANT CHANGE UP
SODIUM SERPL-SCNC: 140 MMOL/L — SIGNIFICANT CHANGE UP (ref 135–145)
SPECIMEN SOURCE: SIGNIFICANT CHANGE UP
WBC # BLD: 8.54 K/UL — SIGNIFICANT CHANGE UP (ref 3.8–10.5)
WBC # FLD AUTO: 8.54 K/UL — SIGNIFICANT CHANGE UP (ref 3.8–10.5)

## 2020-09-14 PROCEDURE — 99232 SBSQ HOSP IP/OBS MODERATE 35: CPT

## 2020-09-14 PROCEDURE — 99233 SBSQ HOSP IP/OBS HIGH 50: CPT

## 2020-09-14 PROCEDURE — 72158 MRI LUMBAR SPINE W/O & W/DYE: CPT | Mod: 26

## 2020-09-14 PROCEDURE — 99231 SBSQ HOSP IP/OBS SF/LOW 25: CPT

## 2020-09-14 PROCEDURE — 99231 SBSQ HOSP IP/OBS SF/LOW 25: CPT | Mod: GC

## 2020-09-14 RX ORDER — SACUBITRIL AND VALSARTAN 24; 26 MG/1; MG/1
1 TABLET, FILM COATED ORAL
Refills: 0 | Status: DISCONTINUED | OUTPATIENT
Start: 2020-09-14 | End: 2020-09-21

## 2020-09-14 RX ORDER — INSULIN LISPRO 100/ML
30 VIAL (ML) SUBCUTANEOUS
Refills: 0 | Status: DISCONTINUED | OUTPATIENT
Start: 2020-09-14 | End: 2020-09-16

## 2020-09-14 RX ORDER — INSULIN GLARGINE 100 [IU]/ML
37 INJECTION, SOLUTION SUBCUTANEOUS ONCE
Refills: 0 | Status: COMPLETED | OUTPATIENT
Start: 2020-09-14 | End: 2020-09-14

## 2020-09-14 RX ADMIN — Medication 81 MILLIGRAM(S): at 12:39

## 2020-09-14 RX ADMIN — Medication 30 UNIT(S): at 12:40

## 2020-09-14 RX ADMIN — Medication 6: at 12:40

## 2020-09-14 RX ADMIN — CEFTRIAXONE 100 MILLIGRAM(S): 500 INJECTION, POWDER, FOR SOLUTION INTRAMUSCULAR; INTRAVENOUS at 17:36

## 2020-09-14 RX ADMIN — Medication 30 UNIT(S): at 17:32

## 2020-09-14 RX ADMIN — CHLORHEXIDINE GLUCONATE 1 APPLICATION(S): 213 SOLUTION TOPICAL at 06:23

## 2020-09-14 RX ADMIN — INSULIN GLARGINE 37 UNIT(S): 100 INJECTION, SOLUTION SUBCUTANEOUS at 23:24

## 2020-09-14 RX ADMIN — TAMSULOSIN HYDROCHLORIDE 0.4 MILLIGRAM(S): 0.4 CAPSULE ORAL at 22:58

## 2020-09-14 RX ADMIN — Medication 50 MILLIGRAM(S): at 17:35

## 2020-09-14 RX ADMIN — Medication 50 MILLIGRAM(S): at 06:22

## 2020-09-14 RX ADMIN — Medication 80 MILLIGRAM(S): at 12:39

## 2020-09-14 RX ADMIN — Medication 2 MILLIGRAM(S): at 20:40

## 2020-09-14 RX ADMIN — LORATADINE 10 MILLIGRAM(S): 10 TABLET ORAL at 12:39

## 2020-09-14 RX ADMIN — PANTOPRAZOLE SODIUM 40 MILLIGRAM(S): 20 TABLET, DELAYED RELEASE ORAL at 06:26

## 2020-09-14 RX ADMIN — Medication 0: at 22:59

## 2020-09-14 RX ADMIN — Medication 6: at 08:21

## 2020-09-14 RX ADMIN — ATORVASTATIN CALCIUM 80 MILLIGRAM(S): 80 TABLET, FILM COATED ORAL at 22:58

## 2020-09-14 RX ADMIN — SACUBITRIL AND VALSARTAN 1 TABLET(S): 24; 26 TABLET, FILM COATED ORAL at 18:20

## 2020-09-14 NOTE — PROGRESS NOTE ADULT - SUBJECTIVE AND OBJECTIVE BOX
24H hour events: awaiting implant SC ICD, afebrile, normal WBC, blood cultures remain NGTD 9/10/20    MEDICATIONS:  aspirin  chewable 81 milliGRAM(s) Oral daily  furosemide    Tablet 80 milliGRAM(s) Oral daily  metoprolol succinate ER 50 milliGRAM(s) Oral every 12 hours  sacubitril 49 mG/valsartan 51 mG 1 Tablet(s) Oral two times a day  tamsulosin 0.4 milliGRAM(s) Oral at bedtime    cefTRIAXone   IVPB 2000 milliGRAM(s) IV Intermittent every 24 hours    ALBUTerol    90 MICROgram(s) HFA Inhaler 2 Puff(s) Inhalation every 12 hours PRN  loratadine 10 milliGRAM(s) Oral daily    acetaminophen   Tablet .. 650 milliGRAM(s) Oral every 6 hours PRN    pantoprazole    Tablet 40 milliGRAM(s) Oral before breakfast    atorvastatin 80 milliGRAM(s) Oral at bedtime  dextrose 40% Gel 15 Gram(s) Oral once PRN  dextrose 50% Injectable 12.5 Gram(s) IV Push once  dextrose 50% Injectable 25 Gram(s) IV Push once  dextrose 50% Injectable 25 Gram(s) IV Push once  glucagon  Injectable 1 milliGRAM(s) IntraMuscular once PRN  insulin glargine Injectable (LANTUS) 75 Unit(s) SubCutaneous at bedtime  insulin lispro (HumaLOG) corrective regimen sliding scale   SubCutaneous three times a day before meals  insulin lispro (HumaLOG) corrective regimen sliding scale   SubCutaneous at bedtime  insulin lispro Injectable (HumaLOG) 30 Unit(s) SubCutaneous three times a day with meals    chlorhexidine 4% Liquid 1 Application(s) Topical <User Schedule>  dextrose 5%. 1000 milliLiter(s) IV Continuous <Continuous>  influenza   Vaccine 0.5 milliLiter(s) IntraMuscular once      REVIEW OF SYSTEMS:  See HPI, otherwise ROS negative.    PHYSICAL EXAM:  T(C): 36.4 (09-14-20 @ 04:16), Max: 37 (09-13-20 @ 16:06)  HR: 86 (09-14-20 @ 04:16) (81 - 101)  BP: 111/73 (09-14-20 @ 04:16) (110/57 - 124/74)  RR: 18 (09-14-20 @ 04:16) (18 - 18)  SpO2: 93% (09-14-20 @ 04:16) (92% - 98%)    I&O's Summary    13 Sep 2020 07:01  -  14 Sep 2020 07:00  --------------------------------------------------------  IN: 0 mL / OUT: 1900 mL / NET: -1900 mL    Appearance: Alert. NAD	MO  Cardiovascular: +S1S2 RRR no m/g/r  Respiratory: CTA B/L	  Psychiatry: A & O x 3, Mood & affect appropriate  Gastrointestinal: obese  Soft, NT. ND. +BS	  Skin: left infraclavicular incision line flat no bleeding no hematoma 	  Neurologic: Non-focal  Extremities: No edema BLE  Vascular: Peripheral pulses palpable 2+ bilaterally   	    LABS:	 	    CBC Full  -  ( 14 Sep 2020 07:36 )  WBC Count : 8.54 K/uL  Hemoglobin : 11.1 g/dL  Hematocrit : 34.7 %  Platelet Count - Automated : 227 K/uL  Mean Cell Volume : 80.7 fl  Mean Cell Hemoglobin : 25.8 pg  Mean Cell Hemoglobin Concentration : 32.0 gm/dL  Auto Neutrophil # : x  Auto Lymphocyte # : x  Auto Monocyte # : x  Auto Eosinophil # : x  Auto Basophil # : x  Auto Neutrophil % : x  Auto Lymphocyte % : x  Auto Monocyte % : x  Auto Eosinophil % : x  Auto Basophil % : x    09-14    140  |  98  |  38<H>  ----------------------------<  229<H>  3.5   |  26  |  1.67<H>  09-13    135  |  97  |  42<H>  ----------------------------<  340<H>  3.6   |  24  |  1.58<H>    Ca    10.0      14 Sep 2020 07:36  Ca    9.7      13 Sep 2020 07:12  Mg     2.2     09-13    TELEMETRY: SR 60 -100  	    RADIOLOGY:    	  < from: TTE with Doppler (w/Cont) (09.09.20 @ 05:44) >  -----------------------------------------------------------------------  Dimensions:    Normal Values:  LA:            2.0 - 4.0 cm  Ao:            2.0 - 3.8 cm  SEPTUM:        0.6 - 1.2 cm  PWT:           0.6 - 1.1 cm  LVIDd:     3.0 - 5.6 cm  LVIDs:         1.8 - 4.0 cm  EF (Visual Estimate): 15-20 %  ------------------------------------------------------------------------  Observations:  Mitral Valve: Tethered mitral valve leaflets with normal  opening-not well visualized. Minimal mitral regurgitation.  Aortic Valve/Aorta: Aortic valve not well visualized;  appears calcified. Peak left ventricular outflow tract  gradient equals 1 mm Hg, LVOT velocity time integral equals  8 cm.  Not well visualized.  Left Atrium: Left atrium not well visualized, probably  normal.  Left Ventricle: Endocardial visualization enhanced with  intravenous injection of Ultrasonic Enhancing Agent  (Definity).  Severe global left ventricular systolic  dysfunction.No obvious LV thrombus. Estimated EF of 15-20%.  No left ventricular thrombus. Left ventricular enlargement.  Severe diastolic dysfunction (Stage III).  Right Heart: A device wire is noted in the right heart. The  right ventricle is not well visualized. Tricuspid valve not  well visualized. No tricuspid regurgitation. Pulmonic valve  not well visualized.  Pericardium/Pleura: Normal pericardium with no pericardial  effusion.  Hemodynamic: Estimated right atrial pressure is 8 mm Hg.  ------------------------------------------------------------------------  Conclusions:  1. Aortic valve not well visualized; appears calcified.  2. Left ventricular enlargement.  3. Endocardial visualization enhanced with intravenous  injection of Ultrasonic Enhancing Agent (Definity).  Severe  global left ventricular systolic dysfunction.No obvious LV  thrombus. Estimated EF of 15-20%. No left ventricular  thrombus.  4. Severe diastolic dysfunction (Stage III).  5. A device wire is noted in the right heart.  6. Tricuspid valve not well visualized. No tricuspid  regurgitation.  7. Pulmonic valve not well visualized.  Unable to rule out endocarditis. Consider DONNIE if clinically  indicated.  ------------------------------------------------------------------------  Confirmed on  9/10/2020 - 11:09:29 by Raza Kraft M.D.  ------------------------------------------------------

## 2020-09-14 NOTE — PROGRESS NOTE ADULT - ASSESSMENT
Assessment  DMT2: 59y Male with DM T2 with hyperglycemia, A1C 11.2%, on basal bolus insulin, increased dose yesterday, Humalog was held this AM 2/2 NPO status, blood sugars overall improving though still running high, no hypoglycemic episodes, now eating meals. Planning colonoscopy 9/17.  Septic Shock: Hydronephrosis, On IV ABx, stable, monitored.  Obesity: No strict exercise routines, not on any weight loss program, neither on low calorie diet.          Cortney Flanagan MD  Cell: [phone]  Office: [phone]               Assessment  DMT2: 59y Male with DM T2 with hyperglycemia, A1C 11.2%, on basal bolus insulin, increased dose yesterday,  Humalog was held this AM 2/2 NPO status, blood sugars overall improving though still running high, no hypoglycemic episodes, now eating meals. Planning colonoscopy 9/17.  Septic Shock: Hydronephrosis, On IV ABx, stable, monitored.  Obesity: No strict exercise routines, not on any weight loss program, neither on low calorie diet.          Cortney Flanagan MD  Cell: [phone]  Office: [phone]

## 2020-09-14 NOTE — PROGRESS NOTE ADULT - SUBJECTIVE AND OBJECTIVE BOX
Patient is a 59y old  Male who presents with a chief complaint of fever, cough, emesis (13 Sep 2020 14:59)    Being followed by ID for        Interval history:  No other acute events      ROS:  No cough,SOB,CP  No N/V/D  No abd pain  No urinary complaints  No HA  No joint or limb pain  No other complaints    PAST MEDICAL & SURGICAL HISTORY:  H/O gastroesophageal reflux (GERD)    History of COPD    History of ischemic cardiomyopathy    Heart failure with reduced ejection fraction    Hypertension    AICD (automatic cardioverter/defibrillator) present    Diabetes    Stented coronary artery    H/O vasectomy  20 yrs ago (2000)      Allergies    No Known Allergies    Intolerances      Antimicrobials:    cefTRIAXone   IVPB 2000 milliGRAM(s) IV Intermittent every 24 hours    MEDICATIONS  (STANDING):  aspirin  chewable 81 milliGRAM(s) Oral daily  atorvastatin 80 milliGRAM(s) Oral at bedtime  cefTRIAXone   IVPB 2000 milliGRAM(s) IV Intermittent every 24 hours  chlorhexidine 4% Liquid 1 Application(s) Topical <User Schedule>  dextrose 5%. 1000 milliLiter(s) (50 mL/Hr) IV Continuous <Continuous>  dextrose 50% Injectable 12.5 Gram(s) IV Push once  dextrose 50% Injectable 25 Gram(s) IV Push once  dextrose 50% Injectable 25 Gram(s) IV Push once  furosemide    Tablet 80 milliGRAM(s) Oral daily  influenza   Vaccine 0.5 milliLiter(s) IntraMuscular once  insulin glargine Injectable (LANTUS) 75 Unit(s) SubCutaneous at bedtime  insulin lispro (HumaLOG) corrective regimen sliding scale   SubCutaneous three times a day before meals  insulin lispro (HumaLOG) corrective regimen sliding scale   SubCutaneous at bedtime  insulin lispro Injectable (HumaLOG) 26 Unit(s) SubCutaneous three times a day before meals  loratadine 10 milliGRAM(s) Oral daily  metoprolol succinate ER 50 milliGRAM(s) Oral every 12 hours  pantoprazole    Tablet 40 milliGRAM(s) Oral before breakfast  tamsulosin 0.4 milliGRAM(s) Oral at bedtime      Vital Signs Last 24 Hrs  T(C): 36.4 (09-14-20 @ 04:16), Max: 37 (09-13-20 @ 16:06)  T(F): 97.5 (09-14-20 @ 04:16), Max: 98.6 (09-13-20 @ 16:06)  HR: 86 (09-14-20 @ 04:16) (81 - 101)  BP: 111/73 (09-14-20 @ 04:16) (110/57 - 124/74)  BP(mean): --  RR: 18 (09-14-20 @ 04:16) (18 - 18)  SpO2: 93% (09-14-20 @ 04:16) (92% - 98%)    Physical Exam:    Constitutional well preserved,comfortable,pleasant    HEENT PERRLA EOMI,No pallor or icterus    No oral exudate or erythema    Neck supple no JVD or LN    Chest Good AE,CTA    CVS RRR S1 S2 WNl No murmur or rub or gallop    Abd soft BS normal No tenderness no masses    Ext No cyanosis clubbing or edema    IV site no erythema tenderness or discharge    Joints no swelling or LOM    CNS AAO X 3 no focal    Lab Data:                          11.1   8.54  )-----------( 227      ( 14 Sep 2020 07:36 )             34.7       09-14    140  |  98  |  38<H>  ----------------------------<  229<H>  3.5   |  26  |  1.67<H>    Ca    10.0      14 Sep 2020 07:36  Mg     2.2     09-13            .Surgical Swab icd lead  09-10-20   No growth  --  --      .Urine Bladder (from O.R.)  09-10-20   No growth  --  --      .Blood Blood-Peripheral  09-10-20   No growth to date.  --  --      .Blood Blood  09-09-20   No growth to date.  --  --      .Stool Feces sarina jose  09-09-20   GI PCR Results: NOT detected  *******Please Note:*******  GI panel PCR evaluates for:  Campylobacter, Plesiomonas shigelloides, Salmonella,  Vibrio, Yersinia enterocolitica, Enteroaggregative  Escherichia coli (EAEC), Enteropathogenic E.coli (EPEC),  Enterotoxigenic E. coli (ETEC) lt/st, Shiga-like  toxin-producing E. coli (STEC) stx1/stx2,  Shigella/ Enteroinvasive E. coli (EIEC), Cryptosporidium,  Cyclospora cayetanensis, Entamoeba histolytica,  Giardia lamblia, Adenovirus F 40/41, Astrovirus,  Norovirus GI/GII, Rotavirus A, Sapovirus  --  --      .Urine Clean Catch (Midstream)  09-09-20   <10,000 CFU/mL Normal Urogenital Dorothy  --  --      .Blood Blood-Peripheral  09-09-20   Growth in aerobic and anaerobic bottles: Streptococcus gallolyticus  "Due to technical problems, Proteus sp. will Not be reported as part of  the BCID panel until further notice"  ***Blood Panel PCR results on this specimen are available  approximately 3 hours after the Gram stain result.***  Gram stain, PCR, and/or culture results may not always  correspond due to difference in methodologies.  ************************************************************  This PCR assay was performed using [employer].  The following targets are tested for: Enterococcus,  vancomycin resistant enterococci, Listeria monocytogenes,  coagulase negative staphylococci, S. aureus,  methicillin resistant S. aureus, Streptococcus agalactiae  (Group B), S. pneumoniae, S. pyogenes (Group A),  Acinetobacter baumannii, Enterobacter cloacae, E. coli,  Klebsiella oxytoca, K. pneumoniae, Proteus sp.,  Serratia marcescens, Haemophilus influenzae,  Neisseria meningitidis, Pseudomonas aeruginosa, Candida  albicans, C. glabrata, C krusei, C parapsilosis,  C. tropicalis and the KPC resistance gene.  --  Blood Culture PCR  Streptococcus gallolyticus                    WBC Count: 8.54 (09-14-20 @ 07:36)  WBC Count: 6.15 (09-13-20 @ 07:12)  WBC Count: 7.07 (09-12-20 @ 06:04)  WBC Count: 8.42 (09-11-20 @ 00:27)  WBC Count: 8.59 (09-10-20 @ 00:24)  WBC Count: 11.16 (09-09-20 @ 04:33)  WBC Count: 10.28 (09-08-20 @ 20:42)

## 2020-09-14 NOTE — PROGRESS NOTE ADULT - SUBJECTIVE AND OBJECTIVE BOX
Patient is a 59y old  Male who presents with a chief complaint of fever, cough, emesis (14 Sep 2020 09:35)    Being followed by ID for        Interval history:  No other acute events      ROS:  No cough,SOB,CP  No N/V/D  No abd pain  No urinary complaints  No HA  No joint or limb pain  No other complaints    PAST MEDICAL & SURGICAL HISTORY:  H/O gastroesophageal reflux (GERD)    History of COPD    History of ischemic cardiomyopathy    Heart failure with reduced ejection fraction    Hypertension    AICD (automatic cardioverter/defibrillator) present    Diabetes    Stented coronary artery    H/O vasectomy  20 yrs ago (2000)      Allergies    No Known Allergies    Intolerances      Antimicrobials:    cefTRIAXone   IVPB 2000 milliGRAM(s) IV Intermittent every 24 hours    MEDICATIONS  (STANDING):  aspirin  chewable 81 milliGRAM(s) Oral daily  atorvastatin 80 milliGRAM(s) Oral at bedtime  cefTRIAXone   IVPB 2000 milliGRAM(s) IV Intermittent every 24 hours  chlorhexidine 4% Liquid 1 Application(s) Topical <User Schedule>  dextrose 5%. 1000 milliLiter(s) (50 mL/Hr) IV Continuous <Continuous>  dextrose 50% Injectable 12.5 Gram(s) IV Push once  dextrose 50% Injectable 25 Gram(s) IV Push once  dextrose 50% Injectable 25 Gram(s) IV Push once  furosemide    Tablet 80 milliGRAM(s) Oral daily  influenza   Vaccine 0.5 milliLiter(s) IntraMuscular once  insulin glargine Injectable (LANTUS) 75 Unit(s) SubCutaneous at bedtime  insulin lispro (HumaLOG) corrective regimen sliding scale   SubCutaneous three times a day before meals  insulin lispro (HumaLOG) corrective regimen sliding scale   SubCutaneous at bedtime  insulin lispro Injectable (HumaLOG) 26 Unit(s) SubCutaneous three times a day before meals  loratadine 10 milliGRAM(s) Oral daily  metoprolol succinate ER 50 milliGRAM(s) Oral every 12 hours  pantoprazole    Tablet 40 milliGRAM(s) Oral before breakfast  tamsulosin 0.4 milliGRAM(s) Oral at bedtime      Vital Signs Last 24 Hrs  T(C): 36.4 (09-14-20 @ 04:16), Max: 37 (09-13-20 @ 16:06)  T(F): 97.5 (09-14-20 @ 04:16), Max: 98.6 (09-13-20 @ 16:06)  HR: 86 (09-14-20 @ 04:16) (81 - 101)  BP: 111/73 (09-14-20 @ 04:16) (110/57 - 124/74)  BP(mean): --  RR: 18 (09-14-20 @ 04:16) (18 - 18)  SpO2: 93% (09-14-20 @ 04:16) (92% - 98%)    Physical Exam:    Constitutional well preserved,comfortable,pleasant    HEENT PERRLA EOMI,No pallor or icterus    No oral exudate or erythema    Neck supple no JVD or LN    Chest Good AE,CTA    CVS RRR S1 S2 WNl No murmur or rub or gallop    Abd soft BS normal No tenderness no masses    Ext No cyanosis clubbing or edema    IV site no erythema tenderness or discharge    Joints no swelling or LOM    CNS AAO X 3 no focal    Lab Data:                          11.1   8.54  )-----------( 227      ( 14 Sep 2020 07:36 )             34.7       09-14    140  |  98  |  38<H>  ----------------------------<  229<H>  3.5   |  26  |  1.67<H>    Ca    10.0      14 Sep 2020 07:36  Mg     2.2     09-13            .Surgical Swab icd lead  09-10-20   No growth  --  --      .Urine Bladder (from O.R.)  09-10-20   No growth  --  --      .Blood Blood-Peripheral  09-10-20   No growth to date.  --  --      .Blood Blood  09-09-20   No growth to date.  --  --      .Stool Feces sarina jose  09-09-20   GI PCR Results: NOT detected  *******Please Note:*******  GI panel PCR evaluates for:  Campylobacter, Plesiomonas shigelloides, Salmonella,  Vibrio, Yersinia enterocolitica, Enteroaggregative  Escherichia coli (EAEC), Enteropathogenic E.coli (EPEC),  Enterotoxigenic E. coli (ETEC) lt/st, Shiga-like  toxin-producing E. coli (STEC) stx1/stx2,  Shigella/ Enteroinvasive E. coli (EIEC), Cryptosporidium,  Cyclospora cayetanensis, Entamoeba histolytica,  Giardia lamblia, Adenovirus F 40/41, Astrovirus,  Norovirus GI/GII, Rotavirus A, Sapovirus  --  --      .Urine Clean Catch (Midstream)  09-09-20   <10,000 CFU/mL Normal Urogenital Dorothy  --  --      .Blood Blood-Peripheral  09-09-20   Growth in aerobic and anaerobic bottles: Streptococcus gallolyticus  "Due to technical problems, Proteus sp. will Not be reported as part of  the BCID panel until further notice"  ***Blood Panel PCR results on this specimen are available  approximately 3 hours after the Gram stain result.***  Gram stain, PCR, and/or culture results may not always  correspond due to difference in methodologies.  ************************************************************  This PCR assay was performed using [employer].  The following targets are tested for: Enterococcus,  vancomycin resistant enterococci, Listeria monocytogenes,  coagulase negative staphylococci, S. aureus,  methicillin resistant S. aureus, Streptococcus agalactiae  (Group B), S. pneumoniae, S. pyogenes (Group A),  Acinetobacter baumannii, Enterobacter cloacae, E. coli,  Klebsiella oxytoca, K. pneumoniae, Proteus sp.,  Serratia marcescens, Haemophilus influenzae,  Neisseria meningitidis, Pseudomonas aeruginosa, Candida  albicans, C. glabrata, C krusei, C parapsilosis,  C. tropicalis and the KPC resistance gene.  --  Blood Culture PCR  Streptococcus gallolyticus                    WBC Count: 8.54 (09-14-20 @ 07:36)  WBC Count: 6.15 (09-13-20 @ 07:12)  WBC Count: 7.07 (09-12-20 @ 06:04)  WBC Count: 8.42 (09-11-20 @ 00:27)  WBC Count: 8.59 (09-10-20 @ 00:24)  WBC Count: 11.16 (09-09-20 @ 04:33)  WBC Count: 10.28 (09-08-20 @ 20:42)             Patient is a 59y old  Male who presents with a chief complaint of fever, cough, emesis (14 Sep 2020 09:35)    Being followed by ID for        Interval history:  pt c/o low back pain  "this is the pain I came in with"  pain radiates to the buttocks  otherwise feeling well   No other acute events          PAST MEDICAL & SURGICAL HISTORY:  H/O gastroesophageal reflux (GERD)    History of COPD    History of ischemic cardiomyopathy    Heart failure with reduced ejection fraction    Hypertension    AICD (automatic cardioverter/defibrillator) present    Diabetes    Stented coronary artery    H/O vasectomy  20 yrs ago (2000)      Allergies    No Known Allergies    Intolerances      Antimicrobials:    cefTRIAXone   IVPB 2000 milliGRAM(s) IV Intermittent every 24 hours    MEDICATIONS  (STANDING):  aspirin  chewable 81 milliGRAM(s) Oral daily  atorvastatin 80 milliGRAM(s) Oral at bedtime  cefTRIAXone   IVPB 2000 milliGRAM(s) IV Intermittent every 24 hours  chlorhexidine 4% Liquid 1 Application(s) Topical <User Schedule>  dextrose 5%. 1000 milliLiter(s) (50 mL/Hr) IV Continuous <Continuous>  dextrose 50% Injectable 12.5 Gram(s) IV Push once  dextrose 50% Injectable 25 Gram(s) IV Push once  dextrose 50% Injectable 25 Gram(s) IV Push once  furosemide    Tablet 80 milliGRAM(s) Oral daily  influenza   Vaccine 0.5 milliLiter(s) IntraMuscular once  insulin glargine Injectable (LANTUS) 75 Unit(s) SubCutaneous at bedtime  insulin lispro (HumaLOG) corrective regimen sliding scale   SubCutaneous three times a day before meals  insulin lispro (HumaLOG) corrective regimen sliding scale   SubCutaneous at bedtime  insulin lispro Injectable (HumaLOG) 26 Unit(s) SubCutaneous three times a day before meals  loratadine 10 milliGRAM(s) Oral daily  metoprolol succinate ER 50 milliGRAM(s) Oral every 12 hours  pantoprazole    Tablet 40 milliGRAM(s) Oral before breakfast  tamsulosin 0.4 milliGRAM(s) Oral at bedtime      Vital Signs Last 24 Hrs  T(C): 36.4 (09-14-20 @ 04:16), Max: 37 (09-13-20 @ 16:06)  T(F): 97.5 (09-14-20 @ 04:16), Max: 98.6 (09-13-20 @ 16:06)  HR: 86 (09-14-20 @ 04:16) (81 - 101)  BP: 111/73 (09-14-20 @ 04:16) (110/57 - 124/74)  BP(mean): --  RR: 18 (09-14-20 @ 04:16) (18 - 18)  SpO2: 93% (09-14-20 @ 04:16) (92% - 98%)    Physical Exam:    Constitutional well preserved,comfortable,pleasant    HEENT PERRLA EOMI,No pallor or icterus    No oral exudate or erythema    Neck supple no JVD or LN    Chest Good AE,CTA    CVS RRR S1 S2   Old AICD site - intact    Abd soft BS normal No tenderness no masses    Ext No cyanosis clubbing or edema    IV site no erythema tenderness or discharge    Joints no swelling or LOM    CNS AAO X 3 no focal    Lab Data:                          11.1   8.54  )-----------( 227      ( 14 Sep 2020 07:36 )             34.7       09-14    140  |  98  |  38<H>  ----------------------------<  229<H>  3.5   |  26  |  1.67<H>    Ca    10.0      14 Sep 2020 07:36  Mg     2.2     09-13            .Surgical Swab icd lead  09-10-20   No growth  --  --      .Urine Bladder (from O.R.)  09-10-20   No growth  --  --      .Blood Blood-Peripheral  09-10-20   No growth to date.  --  --      .Blood Blood  09-09-20   No growth to date.  --  --      .Stool Feces sarina garcia  09-09-20   GI PCR Results: NOT detected  *******Please Note:*******  GI panel PCR evaluates for:  Campylobacter, Plesiomonas shigelloides, Salmonella,  Vibrio, Yersinia enterocolitica, Enteroaggregative  Escherichia coli (EAEC), Enteropathogenic E.coli (EPEC),  Enterotoxigenic E. coli (ETEC) lt/st, Shiga-like  toxin-producing E. coli (STEC) stx1/stx2,  Shigella/ Enteroinvasive E. coli (EIEC), Cryptosporidium,  Cyclospora cayetanensis, Entamoeba histolytica,  Giardia lamblia, Adenovirus F 40/41, Astrovirus,  Norovirus GI/GII, Rotavirus A, Sapovirus  --  --      .Urine Clean Catch (Midstream)  09-09-20   <10,000 CFU/mL Normal Urogenital Dorothy  --  --      .Blood Blood-Peripheral  09-09-20   Growth in aerobic and anaerobic bottles: Streptococcus gallolyticus  "Due to technical problems, Proteus sp. will Not be reported as part of  the BCID panel until further notice"  ***Blood Panel PCR results on this specimen are available  approximately 3 hours after the Gram stain result.***  Gram stain, PCR, and/or culture results may not always  correspond due to difference in methodologies.  ************************************************************  This PCR assay was performed using [employer].  The following targets are tested for: Enterococcus,  vancomycin resistant enterococci, Listeria monocytogenes,  coagulase negative staphylococci, S. aureus,  methicillin resistant S. aureus, Streptococcus agalactiae  (Group B), S. pneumoniae, S. pyogenes (Group A),  Acinetobacter baumannii, Enterobacter cloacae, E. coli,  Klebsiella oxytoca, K. pneumoniae, Proteus sp.,  Serratia marcescens, Haemophilus influenzae,  Neisseria meningitidis, Pseudomonas aeruginosa, Candida  albicans, C. glabrata, C krusei, C parapsilosis,  C. tropicalis and the KPC resistance gene.  --  Blood Culture PCR  Streptococcus gallolyticus                    WBC Count: 8.54 (09-14-20 @ 07:36)  WBC Count: 6.15 (09-13-20 @ 07:12)  WBC Count: 7.07 (09-12-20 @ 06:04)  WBC Count: 8.42 (09-11-20 @ 00:27)  WBC Count: 8.59 (09-10-20 @ 00:24)  WBC Count: 11.16 (09-09-20 @ 04:33)  WBC Count: 10.28 (09-08-20 @ 20:42)

## 2020-09-14 NOTE — PROGRESS NOTE ADULT - SUBJECTIVE AND OBJECTIVE BOX
Chief complaint  Patient is a 59y old  Male who presents with a chief complaint of fever, cough, emesis (14 Sep 2020 10:56)   Review of systems  Patient in bed, looks comfortable, no hypoglycemic episodes.    Labs and Fingersticks  CAPILLARY BLOOD GLUCOSE      POCT Blood Glucose.: 252 mg/dL (14 Sep 2020 12:34)  POCT Blood Glucose.: 252 mg/dL (14 Sep 2020 08:14)  POCT Blood Glucose.: 279 mg/dL (13 Sep 2020 21:35)  POCT Blood Glucose.: 265 mg/dL (13 Sep 2020 16:52)      Anion Gap, Serum: 16 (09-14 @ 07:36)  Anion Gap, Serum: 14 (09-13 @ 07:12)      Calcium, Total Serum: 10.0 (09-14 @ 07:36)  Calcium, Total Serum: 9.7 (09-13 @ 07:12)          09-14    140  |  98  |  38<H>  ----------------------------<  229<H>  3.5   |  26  |  1.67<H>    Ca    10.0      14 Sep 2020 07:36  Mg     2.2     09-13                          11.1   8.54  )-----------( 227      ( 14 Sep 2020 07:36 )             34.7     Medications  MEDICATIONS  (STANDING):  aspirin  chewable 81 milliGRAM(s) Oral daily  atorvastatin 80 milliGRAM(s) Oral at bedtime  cefTRIAXone   IVPB 2000 milliGRAM(s) IV Intermittent every 24 hours  chlorhexidine 4% Liquid 1 Application(s) Topical <User Schedule>  dextrose 5%. 1000 milliLiter(s) (50 mL/Hr) IV Continuous <Continuous>  dextrose 50% Injectable 12.5 Gram(s) IV Push once  dextrose 50% Injectable 25 Gram(s) IV Push once  dextrose 50% Injectable 25 Gram(s) IV Push once  furosemide    Tablet 80 milliGRAM(s) Oral daily  influenza   Vaccine 0.5 milliLiter(s) IntraMuscular once  insulin glargine Injectable (LANTUS) 75 Unit(s) SubCutaneous at bedtime  insulin lispro (HumaLOG) corrective regimen sliding scale   SubCutaneous three times a day before meals  insulin lispro (HumaLOG) corrective regimen sliding scale   SubCutaneous at bedtime  insulin lispro Injectable (HumaLOG) 30 Unit(s) SubCutaneous three times a day with meals  loratadine 10 milliGRAM(s) Oral daily  metoprolol succinate ER 50 milliGRAM(s) Oral every 12 hours  pantoprazole    Tablet 40 milliGRAM(s) Oral before breakfast  sacubitril 49 mG/valsartan 51 mG 1 Tablet(s) Oral two times a day  tamsulosin 0.4 milliGRAM(s) Oral at bedtime      Physical Exam  General: Patient comfortable in bed  Vital Signs Last 12 Hrs  T(F): 97.8 (09-14-20 @ 12:33), Max: 97.8 (09-14-20 @ 12:33)  HR: 89 (09-14-20 @ 12:33) (86 - 89)  BP: 118/80 (09-14-20 @ 12:33) (111/73 - 118/80)  BP(mean): --  RR: 18 (09-14-20 @ 12:33) (18 - 18)  SpO2: 95% (09-14-20 @ 12:33) (93% - 95%)  Neck: No palpable thyroid nodules.  CVS: S1S2, No murmurs  Respiratory: No wheezing, no crepitations  GI: Abdomen soft, bowel sounds positive  Musculoskeletal:  edema lower extremities.   Skin: No skin rashes, no ecchymosis    Diagnostics    A1C with Estimated Average Glucose: Routine (09-09 @ 13:22)           Chief complaint  Patient is a 59y old  Male who presents with a chief complaint of fever, cough, emesis (14 Sep 2020 10:56)   Review of systems  Patient in bed, looks comfortable, no hypoglycemic episodes.    Labs and Fingersticks  CAPILLARY BLOOD GLUCOSE      POCT Blood Glucose.: 252 mg/dL (14 Sep 2020 12:34)  POCT Blood Glucose.: 252 mg/dL (14 Sep 2020 08:14)  POCT Blood Glucose.: 279 mg/dL (13 Sep 2020 21:35)  POCT Blood Glucose.: 265 mg/dL (13 Sep 2020 16:52)      Anion Gap, Serum: 16 (09-14 @ 07:36)  Anion Gap, Serum: 14 (09-13 @ 07:12)      Calcium, Total Serum: 10.0 (09-14 @ 07:36)  Calcium, Total Serum: 9.7 (09-13 @ 07:12)          09-14    140  |  98  |  38<H>  ----------------------------<  229<H>  3.5   |  26  |  1.67<H>    Ca    10.0      14 Sep 2020 07:36  Mg     2.2     09-13                          11.1   8.54  )-----------( 227      ( 14 Sep 2020 07:36 )             34.7     Medications  MEDICATIONS  (STANDING):  aspirin  chewable 81 milliGRAM(s) Oral daily  atorvastatin 80 milliGRAM(s) Oral at bedtime  cefTRIAXone   IVPB 2000 milliGRAM(s) IV Intermittent every 24 hours  chlorhexidine 4% Liquid 1 Application(s) Topical <User Schedule>  dextrose 5%. 1000 milliLiter(s) (50 mL/Hr) IV Continuous <Continuous>  dextrose 50% Injectable 12.5 Gram(s) IV Push once  dextrose 50% Injectable 25 Gram(s) IV Push once  dextrose 50% Injectable 25 Gram(s) IV Push once  furosemide    Tablet 80 milliGRAM(s) Oral daily  influenza   Vaccine 0.5 milliLiter(s) IntraMuscular once  insulin glargine Injectable (LANTUS) 75 Unit(s) SubCutaneous at bedtime  insulin lispro (HumaLOG) corrective regimen sliding scale   SubCutaneous three times a day before meals  insulin lispro (HumaLOG) corrective regimen sliding scale   SubCutaneous at bedtime  insulin lispro Injectable (HumaLOG) 30 Unit(s) SubCutaneous three times a day with meals  loratadine 10 milliGRAM(s) Oral daily  metoprolol succinate ER 50 milliGRAM(s) Oral every 12 hours  pantoprazole    Tablet 40 milliGRAM(s) Oral before breakfast  sacubitril 49 mG/valsartan 51 mG 1 Tablet(s) Oral two times a day  tamsulosin 0.4 milliGRAM(s) Oral at bedtime      Physical Exam  General: Patient comfortable in bed  Vital Signs Last 12 Hrs  T(F): 97.8 (09-14-20 @ 12:33), Max: 97.8 (09-14-20 @ 12:33)  HR: 89 (09-14-20 @ 12:33) (86 - 89)  BP: 118/80 (09-14-20 @ 12:33) (111/73 - 118/80)  BP(mean): --  RR: 18 (09-14-20 @ 12:33) (18 - 18)  SpO2: 95% (09-14-20 @ 12:33) (93% - 95%)  Neck: No palpable thyroid nodules.  CVS: S1S2, No murmurs  Respiratory: No wheezing, no crepitations  GI: Abdomen soft, bowel sounds positive  Musculoskeletal:  edema lower extremities.   Skin: No skin rashes, no ecchymosis    Diagnostics    A1C with Estimated Average Glucose: Routine (09-09 @ 13:22)

## 2020-09-14 NOTE — PROGRESS NOTE ADULT - ASSESSMENT
Patient is a 59 year-old with known ischemic cardiomyopathy status post ICD ([employer], implanted in January 2012), presents with GI complaints, hypotension, found to have sepsis secondary to gram positive bacteremia of unclear source.  ICD explanted yesterday.  Patient now off IV pressor support.    ID consult for bacteremia. MRI for osteo of the spine - Continue antibiotics.   Will follow-up DONNIE to evaluate possible lesion on mitral valve.  EP to evaluate risks/benefits of possible subcutaneous ICD implant vs. wearable defibrillator ([employer]) prior to discharge.    Will slowly restart beta-blocker and Entresto as hemodynamics and renal function permit - patient now off midodrine.     Okay to hold clopidogrel for five days for GI to perform colonoscopy. Continue ASA without interruption.

## 2020-09-14 NOTE — PROGRESS NOTE ADULT - SUBJECTIVE AND OBJECTIVE BOX
HPI:  Patient seen and examined at bedside on 4 Suresh.  Plan for MRI spine to evaluate for osteomyelitis prior to potential subcutaneous ICD implant.    Review Of Systems:           Respiratory: No shortness of breath, cough, or wheezing  Cardiovascular: No chest pain or palpitations  10 point review of systems is otherwise negative except as mentioned above        Medications:  acetaminophen   Tablet .. 650 milliGRAM(s) Oral every 6 hours PRN  ALBUTerol    90 MICROgram(s) HFA Inhaler 2 Puff(s) Inhalation every 12 hours PRN  aspirin  chewable 81 milliGRAM(s) Oral daily  atorvastatin 80 milliGRAM(s) Oral at bedtime  cefTRIAXone   IVPB 2000 milliGRAM(s) IV Intermittent every 24 hours  chlorhexidine 4% Liquid 1 Application(s) Topical <User Schedule>  dextrose 40% Gel 15 Gram(s) Oral once PRN  dextrose 5%. 1000 milliLiter(s) IV Continuous <Continuous>  dextrose 50% Injectable 12.5 Gram(s) IV Push once  dextrose 50% Injectable 25 Gram(s) IV Push once  dextrose 50% Injectable 25 Gram(s) IV Push once  furosemide    Tablet 80 milliGRAM(s) Oral daily  glucagon  Injectable 1 milliGRAM(s) IntraMuscular once PRN  influenza   Vaccine 0.5 milliLiter(s) IntraMuscular once  insulin glargine Injectable (LANTUS) 75 Unit(s) SubCutaneous at bedtime  insulin lispro (HumaLOG) corrective regimen sliding scale   SubCutaneous three times a day before meals  insulin lispro (HumaLOG) corrective regimen sliding scale   SubCutaneous at bedtime  insulin lispro Injectable (HumaLOG) 30 Unit(s) SubCutaneous three times a day with meals  loratadine 10 milliGRAM(s) Oral daily  LORazepam   Injectable 2 milliGRAM(s) IV Push once  metoprolol succinate ER 50 milliGRAM(s) Oral every 12 hours  pantoprazole    Tablet 40 milliGRAM(s) Oral before breakfast  sacubitril 49 mG/valsartan 51 mG 1 Tablet(s) Oral two times a day  tamsulosin 0.4 milliGRAM(s) Oral at bedtime    PAST MEDICAL & SURGICAL HISTORY:  H/O gastroesophageal reflux (GERD)    History of COPD    History of ischemic cardiomyopathy    Heart failure with reduced ejection fraction    Hypertension    AICD (automatic cardioverter/defibrillator) present    Diabetes    Stented coronary artery    H/O vasectomy  20 yrs ago ()      Vitals:  T(C): 36.6 (20 @ 12:33), Max: 37 (20 @ 00:37)  HR: 95 (20 @ 17:25) (81 - 98)  BP: 111/73 (20 @ 17:25) (110/57 - 118/80)  BP(mean): --  RR: 18 (20 @ 17:25) (16 - 18)  SpO2: 96% (20 @ 17:25) (92% - 96%)  Wt(kg): --  Daily     Daily Weight in k.7 (14 Sep 2020 07:57)  I&O's Summary    13 Sep 2020 07:01  -  14 Sep 2020 07:00  --------------------------------------------------------  IN: 0 mL / OUT: 1900 mL / NET: -1900 mL    14 Sep 2020 07:01  -  14 Sep 2020 18:57  --------------------------------------------------------  IN: 900 mL / OUT: 0 mL / NET: 900 mL        Physical Exam:  Appearance: Normal, well groomed, NAD  Eyes: PERRLA, EOMI, pink conjunctiva, no scleral icterus   HENT: Normal oral mucosa  Cardiovascular: RRR, S1, S2, no murmur, rub, or gallop; +edema; +JVD  Procedural access site: clean, dry, intact without hematoma  Respiratory: diminished breath sounds throughout  Gastrointestinal: Soft, non-tender, non-distended, BS+  Musculoskeletal: No clubbing or joint deformity   Neurologic: No focal weakness  Lymphatic: No lymphadenopathy  Psychiatry: AAOx3 with appropriate mood and affect  Skin: No rashes, ecchymoses, or cyanosis; +tattoos                         11.1   8.54  )-----------( 227      ( 14 Sep 2020 07:36 )             34.7     -14    140  |  98  |  38<H>  ----------------------------<  229<H>  3.5   |  26  |  1.67<H>    Ca    10.0      14 Sep 2020 07:36  Mg     2.2     09-13      PT/INR - ( 14 Sep 2020 10:00 )   PT: 12.9 sec;   INR: 1.09 ratio         PTT - ( 14 Sep 2020 10:00 )  PTT:32.1 sec      Serum Pro-Brain Natriuretic Peptide: 1158 pg/mL ( @ 20:42)      Interpretation of Telemetry: NSR without ventricular ectopy or heart block

## 2020-09-14 NOTE — PROGRESS NOTE ADULT - SUBJECTIVE AND OBJECTIVE BOX
Chief Complaint:  Patient is a 59y old  Male who presents with a chief complaint of fever, cough, emesis (14 Sep 2020 09:57)      Interval Events: no new events    Allergies:  No Known Allergies      Hospital Medications:  acetaminophen   Tablet .. 650 milliGRAM(s) Oral every 6 hours PRN  ALBUTerol    90 MICROgram(s) HFA Inhaler 2 Puff(s) Inhalation every 12 hours PRN  aspirin  chewable 81 milliGRAM(s) Oral daily  atorvastatin 80 milliGRAM(s) Oral at bedtime  cefTRIAXone   IVPB 2000 milliGRAM(s) IV Intermittent every 24 hours  chlorhexidine 4% Liquid 1 Application(s) Topical <User Schedule>  dextrose 40% Gel 15 Gram(s) Oral once PRN  dextrose 5%. 1000 milliLiter(s) IV Continuous <Continuous>  dextrose 50% Injectable 12.5 Gram(s) IV Push once  dextrose 50% Injectable 25 Gram(s) IV Push once  dextrose 50% Injectable 25 Gram(s) IV Push once  furosemide    Tablet 80 milliGRAM(s) Oral daily  glucagon  Injectable 1 milliGRAM(s) IntraMuscular once PRN  influenza   Vaccine 0.5 milliLiter(s) IntraMuscular once  insulin glargine Injectable (LANTUS) 75 Unit(s) SubCutaneous at bedtime  insulin lispro (HumaLOG) corrective regimen sliding scale   SubCutaneous three times a day before meals  insulin lispro (HumaLOG) corrective regimen sliding scale   SubCutaneous at bedtime  insulin lispro Injectable (HumaLOG) 30 Unit(s) SubCutaneous three times a day with meals  loratadine 10 milliGRAM(s) Oral daily  metoprolol succinate ER 50 milliGRAM(s) Oral every 12 hours  pantoprazole    Tablet 40 milliGRAM(s) Oral before breakfast  sacubitril 49 mG/valsartan 51 mG 1 Tablet(s) Oral two times a day  tamsulosin 0.4 milliGRAM(s) Oral at bedtime      PMHX/PSHX:  H/O gastroesophageal reflux (GERD)    History of COPD    History of ischemic cardiomyopathy    Heart failure with reduced ejection fraction    Hypertension    AICD (automatic cardioverter/defibrillator) present    Diabetes    Stented coronary artery    H/O vasectomy    No significant past surgical history        Family history:  Family history of cardiac disorder    Family history of COPD (chronic obstructive pulmonary disease) (Sibling)    No pertinent family history in first degree relatives        ROS: As per HPI, 14-point ROS negative otherwise.    General:  No wt loss, fevers, chills, night sweats, fatigue,   Eyes:  Good vision, no reported pain  ENT:  No sore throat, pain, runny nose, dysphagia  CV:  No pain, palpitations, hypo/hypertension  Resp:  No dyspnea, cough, tachypnea, wheezing  GI:  See HPI  :  No pain, bleeding, incontinence, nocturia  Muscle:  No pain, weakness  Neuro:  No weakness, tingling, memory problems  Psych:  No fatigue, insomnia, mood problems, depression  Endocrine:  No polyuria, polydipsia, cold/heat intolerance  Heme:  No petechiae, ecchymosis, easy bruisability  Skin:  No rash, edema      PHYSICAL EXAM:     Vital Signs:  Vital Signs Last 24 Hrs  T(C): 36.4 (14 Sep 2020 04:16), Max: 37 (13 Sep 2020 16:06)  T(F): 97.5 (14 Sep 2020 04:16), Max: 98.6 (13 Sep 2020 16:06)  HR: 86 (14 Sep 2020 04:16) (81 - 101)  BP: 111/73 (14 Sep 2020 04:16) (110/57 - 124/74)  BP(mean): --  RR: 18 (14 Sep 2020 04:16) (18 - 18)  SpO2: 93% (14 Sep 2020 04:16) (92% - 98%)  Daily     Daily Weight in k.7 (14 Sep 2020 07:57)    GENERAL:  appears comfortable, no acute distress  HEENT:  NC/AT,  conjunctivae clear, sclera -anicteric  CHEST:  no increased effort  HEART:  Regular rate and rhythm  ABDOMEN:  Soft, non-tender, non-distended,  no masses ,no hepato-splenomegaly,   EXTREMITIES:  no cyanosis, clubbing or edema  SKIN:  No rash/erythema/ecchymoses/petechiae/wounds  NEURO:  Alert, oriented    LABS:                        11.1   8.54  )-----------( 227      ( 14 Sep 2020 07:36 )             34.7     09-14    140  |  98  |  38<H>  ----------------------------<  229<H>  3.5   |  26  |  1.67<H>    Ca    10.0      14 Sep 2020 07:36  Mg     2.2     09-13        PT/INR - ( 14 Sep 2020 10:00 )   PT: 12.9 sec;   INR: 1.09 ratio         PTT - ( 14 Sep 2020 10:00 )  PTT:32.1 sec        Imaging:

## 2020-09-14 NOTE — PROGRESS NOTE ADULT - ASSESSMENT
59 year old morbidly obese male PMH COPD (not on home O2) T2DM, HTN, CAD/MI s/p stents (most recent 2/2018), ICM w/ HFrEF (10-15%) s/p primary prevention STJ VVI ICD, HTN, GERD, BPH, who was admitted for sepsis 2/2 gram positive bacteremia.  Admitted to MICU for hypotension, requiring pressors. now s/p ICD extraction 9/10    STJ ICD Fortify VR 1231-40Q ICD: implanted 1/282012   Durata 7121Q/65cm: Implanted 1/28/2012     1. ICM EF 10-15%  2. Streptococcus Bacteremia     s/p single chamber ICD 9/10/20 extraction   afebrile, normal WBC, blood cultures remain NGTD 9/10/20  continue antibiotics per ID  SC ICD screening done by representative, patient screened in for SC ICD   NPO currently awaiting ID clearance for SC ICD today   type and screen done today    [id number]  addendum 1039  plans for MRI spine by ID to r/o OM as cause bacteremia  SC- ICD deferred at this time , likely tomorrow  make NPO after midnight tonight for tomorrow

## 2020-09-14 NOTE — PROGRESS NOTE ADULT - ASSESSMENT
Assessment: 59M PMH T2DM, HTN, CAD/MI s/p stents (most recent 2/2018), ICM w/ HFrEF (10-15%) s/p AICD, COPD (not on home O2), HTN, GERD, BPH, who is admitted for sepsis 2/2 gram positive bacteremia.   s/p MICU stay       # Strep Gallolyticus bacteremia  -Continue with Ceftriaxone   -MRI Lumbar spine r/o OM         # Hypotension secondary to Septic Shock  - resolved, off Midodrine   - continue furosemide, Metoprolol    Diuresing as needed    # HFrEF s/p AICD  - most recent echo from 12/2019: EF 10-15% sev global LV systolic dysfunction. eccentric LVH  - cardiology following   - resumed b-blocker but holding onto rest of HF meds.  - s/p AICD extraction in setting of Group D Strep Bacteremia  -Plan for AICD placement am tomorrow   # CAD s/p stent  - c/w home DAPT (ASA off Plavix fro colonoscopy on Thursday.   -High dose statin.      GI/NUTRITION  - GI PCR negative.   - Continuing home pantoprazole po 40mg  - diabetic diet.  - GI consulted, recommend patient to be off Plavix for 5 days for colonoscopy.       - MANJIT improving -   - continuing home tamsulosin 0.4mg  - VOD trial   -Renal ultrasound       Hematologic  - Monitor CBC, currently stable    #DVT ppx  - SCDs for DVT ppx      # Strep Gallolyticus bacteremia  -Continue with Ceftriaxone   # Hyperglycemia   - Pt w/ hx of IDDM on home Lantus 66u qhs and Humalog 18 units pre meal   - Will monitor FSS with moderate ISS    Ethics  - GOC discussed Patient wants full code.

## 2020-09-14 NOTE — PROGRESS NOTE ADULT - ASSESSMENT
ASSESSMENT/RECOMMENDATIONS:  59M PMH T2DM(a1c=11.2%), HTN, CAD/MI s/p stents (most recent 2/2018), HFrEF (10-15%), ICD, ischemic cardiomyopathy, COPD, HTN, GERD pw fever, n/v, back pain x 1 day. Pt states that he was in his baseline state of health when he began to experience back pain while grocery shopping yesterday. He describes sudden onset left lower back pain, palliated by sitting upright, of sharp quality, without radiation, of 7/10 severity, which completely resolved when presenting to ED.     VS: febrile 102.9, HR , BP 63/40 to 105/62, RR 17-25, SpO2%  on RA  Labs: significant for WBC 10.28, thrombocytopenia 146, elevated BUN/SCr 32/1.88, hyperglycemic 247, elevated proBNP 1158, lactate wnl  Micro: COVID-19 negative, RVP negative, UA 0 WBC and negative for bacteria. GI PCR negative. BCx: Strep by PCR.  CT: No pneumonia. Emphysema. No bowel or obstruction. Hepatomegaly and diffuse hepatic steatosis. Splenomegaly. Atrophic right kidney, with severe hydronephrosis. A 0.7 cm soft tissue density is noted in the interpolar region of the right kidney and is incompletely characterized. Ultrasound may be helpful for further evaluation.  Pt admitted to MICU for management of hypotension requiring pressor support. While in ICU experienced emesis and diarrhea (without melena/hematochezia) x 1 with SOB.     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    Overall bacteremia, strep infection.       Plan:   continue Rocephin at current dose.   GI on board, plan for colonoscopy Wednesday   will discuss with EPS about the timing of a new AICD- PLEASE OBTAIN a MRI of LS spine prior to inserting a new device  follow up blood cultures so far NGTD  ICD lead cx NTD   Cr  remains elevated

## 2020-09-14 NOTE — PROGRESS NOTE ADULT - SUBJECTIVE AND OBJECTIVE BOX
INTERVAL HPI/OVERNIGHT EVENTS:   s/p AICD extraction, patient tolerated procedure very well.   Patient ambulating well, no complaints     T(C): 36.4 (09-14-20 @ 22:24), Max: 36.6 (09-14-20 @ 12:33)  HR: 90 (09-14-20 @ 22:24) (89 - 98)  BP: 112/70 (09-14-20 @ 22:24) (111/73 - 118/80)  RR: 18 (09-14-20 @ 22:24) (16 - 18)  SpO2: 95% (09-14-20 @ 22:24) (95% - 96%)      MEDICATIONS  (STANDING):  aspirin  chewable 81 milliGRAM(s) Oral daily  atorvastatin 80 milliGRAM(s) Oral at bedtime  cefTRIAXone   IVPB 2000 milliGRAM(s) IV Intermittent every 24 hours  chlorhexidine 4% Liquid 1 Application(s) Topical <User Schedule>  dextrose 5%. 1000 milliLiter(s) (50 mL/Hr) IV Continuous <Continuous>  dextrose 50% Injectable 12.5 Gram(s) IV Push once  dextrose 50% Injectable 25 Gram(s) IV Push once  dextrose 50% Injectable 25 Gram(s) IV Push once  furosemide    Tablet 80 milliGRAM(s) Oral daily  influenza   Vaccine 0.5 milliLiter(s) IntraMuscular once  insulin glargine Injectable (LANTUS) 75 Unit(s) SubCutaneous at bedtime  insulin lispro (HumaLOG) corrective regimen sliding scale   SubCutaneous three times a day before meals  insulin lispro (HumaLOG) corrective regimen sliding scale   SubCutaneous at bedtime  insulin lispro Injectable (HumaLOG) 30 Unit(s) SubCutaneous three times a day with meals  loratadine 10 milliGRAM(s) Oral daily  metoprolol succinate ER 50 milliGRAM(s) Oral every 12 hours  pantoprazole    Tablet 40 milliGRAM(s) Oral before breakfast  sacubitril 49 mG/valsartan 51 mG 1 Tablet(s) Oral two times a day  tamsulosin 0.4 milliGRAM(s) Oral at bedtime    MEDICATIONS  (PRN):  acetaminophen   Tablet .. 650 milliGRAM(s) Oral every 6 hours PRN Temp greater or equal to 38C (100.4F), Mild Pain (1 - 3)  ALBUTerol    90 MICROgram(s) HFA Inhaler 2 Puff(s) Inhalation every 12 hours PRN Shortness of Breath and/or Wheezing  dextrose 40% Gel 15 Gram(s) Oral once PRN Blood Glucose LESS THAN 70 milliGRAM(s)/deciliter  glucagon  Injectable 1 milliGRAM(s) IntraMuscular once PRN Glucose LESS THAN 70 milligrams/deciliter  OUT:    Indwelling Catheter - Urethral: 2305 mL    Voided: 50 mL  Total OUT: 2355 mL    Total NET: -1105 mL        PHYSICAL EXAM:    Constitutional: NAD. well-developed; well-groomed; well-nourished;  HEENT: AT/NC, PERRLA; EOMI, MMM, no oropharyngeal lesions, no erythema, no exudates,   Respiratory: CTAB. equal aeration bilaterally. no wheezing, no crackes, no rhonchi.   Cardiovascular: RRR, no M/R/G. no JVD  Gastrointestinal: soft; NT/ND, obese, +BS, no rebounding tenderness / guarding / HSM / mass / ascites.  Extremities: no clubbing; no cyanosis; 1+ LE edema to shins, non-tender to palpation, DP and Radial pulses intact.  Skin: warm and dry; color normal: no rash: no ulcers  Neurological: A&Ox 3; responds to pain; responds to verbal commands; CN nerves grossly intact.              `                                                      11.1   8.54  )-----------( 227      ( 14 Sep 2020 07:36 )             34.7             PT/INR - ( 14 Sep 2020 10:00 )   PT: 12.9 sec;   INR: 1.09 ratio         PTT - ( 14 Sep 2020 10:00 )  PTT:32.1 sec  140|98|38<229  3.5|26|1.67  10.0,--,--  09-14 @ 07:36  GI PCR Panel, Stool (collected 09 Sep 2020 10:21)  Source: .Stool Feces sarina garcia  Final Report (09 Sep 2020 12:58):    GI PCR Results: NOT detected    *******Please Note:*******    GI panel PCR evaluates for:    Campylobacter, Plesiomonas shigelloides, Salmonella,    Vibrio, Yersinia enterocolitica, Enteroaggregative    Escherichia coli (EAEC), Enteropathogenic E.coli (EPEC),    Enterotoxigenic E. coli (ETEC) lt/st, Shiga-like    toxin-producing E. coli (STEC) stx1/stx2,    Shigella/ Enteroinvasive E. coli (EIEC), Cryptosporidium,    Cyclospora cayetanensis, Entamoeba histolytica,    Giardia lamblia, Adenovirus F 40/41, Astrovirus,    Norovirus GI/GII, Rotavirus A, Sapovirus    Culture - Stool (09.09.20 @ 10:21)    Specimen Source: .Stool Feces  sarina jose    Culture Results:   No enteric pathogens to date: Final culture pending    Culture - Blood (09.09.20 @ 00:57)    Gram Stain:   Growth in aerobic and anaerobic bottles: Gram Positive Cocci in Pairs and  Chains    Specimen Source: .Blood Blood-Peripheral    Culture Results:   Growth in aerobic and anaerobic bottles: Streptococcus gallolyticus  See previous culture [id number]        IMAGING:      < from: CT Chest No Cont (09.09.20 @ 01:27) >  IMPRESSION:  No pneumonia.    Emphysema.    No bowel or obstruction.    Hepatomegaly and diffuse hepatic steatosis.    Splenomegaly.    Atrophic right kidney, with severe hydronephrosis. A 0.7 cm soft tissue density is noted in the interpolar region of the right kidney and is incompletely characterized. Ultrasound may be helpful for further evaluation.    < end of copied text >

## 2020-09-14 NOTE — PROGRESS NOTE ADULT - ASSESSMENT
gar59 yo man with HTN, T2DM, CAD/MI s/p stents (most recent 2/2018), HFrEF (10-15%) s/p ICD, COPD, HTN, GERD, BPH admitted 9/9/2020 for sepsis 2/2 gram+ Strep Gallolyticus bacteremia s/p AICD 9/10/2020 for source. GI consulted for colonoscopy.    Impression:  # Strep Gallolyticus bacteremia: Associated with possible colon cancer. Mildly anemia but no weight loss or change in stool caliber. No prior colonoscopy. Patient last plavix dose was 9/12  # CAD: on DAPT, last stent in 2019.   # HFrEF s/p AICD removal  # Hypertension  # COPD    Recommendation:  - plan for colonoscopy on 9/17  - plan for prep 9/16  - CLD on 9/16 and NPO at midnight  - GI to follow  - rest of care per primary team

## 2020-09-14 NOTE — CHART NOTE - NSCHARTNOTEFT_GEN_A_CORE
Discussed with Dr. Lawson and pt to get Ativan 2mg IVP prior to MRI. Pt unable to tolerate earlier today due to claustrophobia.     Lucila Quinteros, NP-c  [id number]

## 2020-09-15 LAB
ANION GAP SERPL CALC-SCNC: 15 MMOL/L — SIGNIFICANT CHANGE UP (ref 5–17)
APTT BLD: 31.3 SEC — SIGNIFICANT CHANGE UP (ref 27.5–35.5)
BUN SERPL-MCNC: 31 MG/DL — HIGH (ref 7–23)
CALCIUM SERPL-MCNC: 9.9 MG/DL — SIGNIFICANT CHANGE UP (ref 8.4–10.5)
CHLORIDE SERPL-SCNC: 99 MMOL/L — SIGNIFICANT CHANGE UP (ref 96–108)
CO2 SERPL-SCNC: 24 MMOL/L — SIGNIFICANT CHANGE UP (ref 22–31)
CREAT SERPL-MCNC: 1.52 MG/DL — HIGH (ref 0.5–1.3)
CULTURE RESULTS: SIGNIFICANT CHANGE UP
GLUCOSE BLDC GLUCOMTR-MCNC: 135 MG/DL — HIGH (ref 70–99)
GLUCOSE BLDC GLUCOMTR-MCNC: 158 MG/DL — HIGH (ref 70–99)
GLUCOSE BLDC GLUCOMTR-MCNC: 241 MG/DL — HIGH (ref 70–99)
GLUCOSE BLDC GLUCOMTR-MCNC: 242 MG/DL — HIGH (ref 70–99)
GLUCOSE SERPL-MCNC: 245 MG/DL — HIGH (ref 70–99)
HCT VFR BLD CALC: 42 % — SIGNIFICANT CHANGE UP (ref 39–50)
HGB BLD-MCNC: 13.2 G/DL — SIGNIFICANT CHANGE UP (ref 13–17)
INR BLD: 1.1 RATIO — SIGNIFICANT CHANGE UP (ref 0.88–1.16)
MAGNESIUM SERPL-MCNC: 2.2 MG/DL — SIGNIFICANT CHANGE UP (ref 1.6–2.6)
MCHC RBC-ENTMCNC: 25.5 PG — LOW (ref 27–34)
MCHC RBC-ENTMCNC: 31.4 GM/DL — LOW (ref 32–36)
MCV RBC AUTO: 81.2 FL — SIGNIFICANT CHANGE UP (ref 80–100)
NRBC # BLD: 0 /100 WBCS — SIGNIFICANT CHANGE UP (ref 0–0)
PLATELET # BLD AUTO: 244 K/UL — SIGNIFICANT CHANGE UP (ref 150–400)
POTASSIUM SERPL-MCNC: 3.7 MMOL/L — SIGNIFICANT CHANGE UP (ref 3.5–5.3)
POTASSIUM SERPL-SCNC: 3.7 MMOL/L — SIGNIFICANT CHANGE UP (ref 3.5–5.3)
PROTHROM AB SERPL-ACNC: 13 SEC — SIGNIFICANT CHANGE UP (ref 10.6–13.6)
RBC # BLD: 5.17 M/UL — SIGNIFICANT CHANGE UP (ref 4.2–5.8)
RBC # FLD: 15.8 % — HIGH (ref 10.3–14.5)
SODIUM SERPL-SCNC: 138 MMOL/L — SIGNIFICANT CHANGE UP (ref 135–145)
SPECIMEN SOURCE: SIGNIFICANT CHANGE UP
WBC # BLD: 9.24 K/UL — SIGNIFICANT CHANGE UP (ref 3.8–10.5)
WBC # FLD AUTO: 9.24 K/UL — SIGNIFICANT CHANGE UP (ref 3.8–10.5)

## 2020-09-15 PROCEDURE — 99233 SBSQ HOSP IP/OBS HIGH 50: CPT

## 2020-09-15 PROCEDURE — 99231 SBSQ HOSP IP/OBS SF/LOW 25: CPT

## 2020-09-15 RX ADMIN — Medication 4: at 12:59

## 2020-09-15 RX ADMIN — TAMSULOSIN HYDROCHLORIDE 0.4 MILLIGRAM(S): 0.4 CAPSULE ORAL at 21:18

## 2020-09-15 RX ADMIN — Medication 0: at 21:52

## 2020-09-15 RX ADMIN — Medication 50 MILLIGRAM(S): at 06:04

## 2020-09-15 RX ADMIN — LORATADINE 10 MILLIGRAM(S): 10 TABLET ORAL at 13:02

## 2020-09-15 RX ADMIN — SACUBITRIL AND VALSARTAN 1 TABLET(S): 24; 26 TABLET, FILM COATED ORAL at 06:04

## 2020-09-15 RX ADMIN — INSULIN GLARGINE 75 UNIT(S): 100 INJECTION, SOLUTION SUBCUTANEOUS at 21:52

## 2020-09-15 RX ADMIN — Medication 30 UNIT(S): at 13:00

## 2020-09-15 RX ADMIN — CEFTRIAXONE 100 MILLIGRAM(S): 500 INJECTION, POWDER, FOR SOLUTION INTRAMUSCULAR; INTRAVENOUS at 17:10

## 2020-09-15 RX ADMIN — ATORVASTATIN CALCIUM 80 MILLIGRAM(S): 80 TABLET, FILM COATED ORAL at 21:18

## 2020-09-15 RX ADMIN — PANTOPRAZOLE SODIUM 40 MILLIGRAM(S): 20 TABLET, DELAYED RELEASE ORAL at 06:04

## 2020-09-15 RX ADMIN — Medication 2: at 17:33

## 2020-09-15 RX ADMIN — Medication 50 MILLIGRAM(S): at 17:32

## 2020-09-15 RX ADMIN — Medication 80 MILLIGRAM(S): at 06:03

## 2020-09-15 RX ADMIN — Medication 4: at 08:41

## 2020-09-15 RX ADMIN — Medication 30 UNIT(S): at 17:35

## 2020-09-15 RX ADMIN — Medication 81 MILLIGRAM(S): at 13:02

## 2020-09-15 RX ADMIN — CHLORHEXIDINE GLUCONATE 1 APPLICATION(S): 213 SOLUTION TOPICAL at 06:04

## 2020-09-15 RX ADMIN — SACUBITRIL AND VALSARTAN 1 TABLET(S): 24; 26 TABLET, FILM COATED ORAL at 17:32

## 2020-09-15 NOTE — PROGRESS NOTE ADULT - ASSESSMENT
Patient is a 59 year-old with known ischemic cardiomyopathy status post ICD ([employer], implanted in January 2012), presents with GI complaints, hypotension, found to have sepsis secondary to gram positive bacteremia of unclear source.  ICD explanted.  Patient now off IV pressor support.    ID consult for bacteremia. MRI for osteo of the spine - Continue antibiotics.   Will follow-up DONNIE to evaluate possible lesion on mitral valve.  EP to evaluate for subcutaneous ICD implant prior to discharge.    Uptitrate beta-blocker and Entresto as BP permits.    Okay to hold clopidogrel for five days for GI to perform colonoscopy. Continue ASA without interruption.

## 2020-09-15 NOTE — PROGRESS NOTE ADULT - SUBJECTIVE AND OBJECTIVE BOX
INTERVAL HPI/OVERNIGHT EVENTS:   Patient ambulating well, no complaints     T(C): 36.4 (09-15-20 @ 12:11), Max: 36.4 (09-15-20 @ 12:11)  HR: 82 (09-15-20 @ 12:11) (82 - 88)  BP: 98/66 (09-15-20 @ 12:11) (98/66 - 103/69)  RR: 18 (09-15-20 @ 12:11) (18 - 18)  SpO2: 96% (09-15-20 @ 12:11) (95% - 96%)      MEDICATIONS  (STANDING):  aspirin  chewable 81 milliGRAM(s) Oral daily  atorvastatin 80 milliGRAM(s) Oral at bedtime  cefTRIAXone   IVPB 2000 milliGRAM(s) IV Intermittent every 24 hours  chlorhexidine 4% Liquid 1 Application(s) Topical <User Schedule>  dextrose 5%. 1000 milliLiter(s) (50 mL/Hr) IV Continuous <Continuous>  dextrose 50% Injectable 12.5 Gram(s) IV Push once  dextrose 50% Injectable 25 Gram(s) IV Push once  dextrose 50% Injectable 25 Gram(s) IV Push once  furosemide    Tablet 80 milliGRAM(s) Oral daily  influenza   Vaccine 0.5 milliLiter(s) IntraMuscular once  insulin glargine Injectable (LANTUS) 75 Unit(s) SubCutaneous at bedtime  insulin lispro (HumaLOG) corrective regimen sliding scale   SubCutaneous three times a day before meals  insulin lispro (HumaLOG) corrective regimen sliding scale   SubCutaneous at bedtime  insulin lispro Injectable (HumaLOG) 30 Unit(s) SubCutaneous three times a day with meals  loratadine 10 milliGRAM(s) Oral daily  metoprolol succinate ER 50 milliGRAM(s) Oral every 12 hours  pantoprazole    Tablet 40 milliGRAM(s) Oral before breakfast  sacubitril 49 mG/valsartan 51 mG 1 Tablet(s) Oral two times a day  tamsulosin 0.4 milliGRAM(s) Oral at bedtime    MEDICATIONS  (PRN):  acetaminophen   Tablet .. 650 milliGRAM(s) Oral every 6 hours PRN Temp greater or equal to 38C (100.4F), Mild Pain (1 - 3)  ALBUTerol    90 MICROgram(s) HFA Inhaler 2 Puff(s) Inhalation every 12 hours PRN Shortness of Breath and/or Wheezing  dextrose 40% Gel 15 Gram(s) Oral once PRN Blood Glucose LESS THAN 70 milliGRAM(s)/deciliter  glucagon  Injectable 1 milliGRAM(s) IntraMuscular once PRN Glucose LESS THAN 70 milligrams/deciliter      PHYSICAL EXAM:    Constitutional: NAD. well-developed; well-groomed; well-nourished;  HEENT: AT/NC, PERRLA; EOMI, MMM, no oropharyngeal lesions, no erythema, no exudates,   Respiratory: CTAB. equal aeration bilaterally. no wheezing, no crackes, no rhonchi.   Cardiovascular: RRR, no M/R/G. no JVD  Gastrointestinal: soft; NT/ND, obese, +BS, no rebounding tenderness / guarding / HSM / mass / ascites.  Extremities: no clubbing; no cyanosis; 1+ LE edema to shins, non-tender to palpation, DP and Radial pulses intact.  Skin: warm and dry; color normal: no rash: no ulcers  Neurological: A&Ox 3; responds to pain; responds to verbal commands; CN nerves grossly intact.              `                                                  13.2   9.24  )-----------( 244      ( 15 Sep 2020 09:12 )             42.0             PT/INR - ( 15 Sep 2020 09:12 )   PT: 13.0 sec;   INR: 1.10 ratio         PTT - ( 15 Sep 2020 09:12 )  PTT:31.3 sec  138|99|31<245  3.7|24|1.52  9.9,2.2,--  09-15 @ 09:12      GI PCR Panel, Stool (collected 09 Sep 2020 10:21)  Source: .Stool Feces sarina garcia  Final Report (09 Sep 2020 12:58):    GI PCR Results: NOT detected    *******Please Note:*******    GI panel PCR evaluates for:    Campylobacter, Plesiomonas shigelloides, Salmonella,    Vibrio, Yersinia enterocolitica, Enteroaggregative    Escherichia coli (EAEC), Enteropathogenic E.coli (EPEC),    Enterotoxigenic E. coli (ETEC) lt/st, Shiga-like    toxin-producing E. coli (STEC) stx1/stx2,    Shigella/ Enteroinvasive E. coli (EIEC), Cryptosporidium,    Cyclospora cayetanensis, Entamoeba histolytica,    Giardia lamblia, Adenovirus F 40/41, Astrovirus,    Norovirus GI/GII, Rotavirus A, Sapovirus    Culture - Stool (09.09.20 @ 10:21)    Specimen Source: .Stool Feces  sarina jose    Culture Results:   No enteric pathogens to date: Final culture pending    Culture - Blood (09.09.20 @ 00:57)    Gram Stain:   Growth in aerobic and anaerobic bottles: Gram Positive Cocci in Pairs and  Chains    Specimen Source: .Blood Blood-Peripheral    Culture Results:   Growth in aerobic and anaerobic bottles: Streptococcus gallolyticus  See previous culture [id number]        IMAGING:      < from: CT Chest No Cont (09.09.20 @ 01:27) >  IMPRESSION:  No pneumonia.    Emphysema.    No bowel or obstruction.    Hepatomegaly and diffuse hepatic steatosis.    Splenomegaly.    Atrophic right kidney, with severe hydronephrosis. A 0.7 cm soft tissue density is noted in the interpolar region of the right kidney and is incompletely characterized. Ultrasound may be helpful for further evaluation.    < end of copied text >

## 2020-09-15 NOTE — PROGRESS NOTE ADULT - SUBJECTIVE AND OBJECTIVE BOX
HPI:  Patient seen and examined at bedside on 4 Suresh.  MRI spine complete.    Review Of Systems:           Respiratory: No shortness of breath, cough, or wheezing  Cardiovascular: No chest pain or palpitations  10 point review of systems is otherwise negative except as mentioned above        Medications:  acetaminophen   Tablet .. 650 milliGRAM(s) Oral every 6 hours PRN  ALBUTerol    90 MICROgram(s) HFA Inhaler 2 Puff(s) Inhalation every 12 hours PRN  aspirin  chewable 81 milliGRAM(s) Oral daily  atorvastatin 80 milliGRAM(s) Oral at bedtime  cefTRIAXone   IVPB 2000 milliGRAM(s) IV Intermittent every 24 hours  chlorhexidine 4% Liquid 1 Application(s) Topical <User Schedule>  dextrose 40% Gel 15 Gram(s) Oral once PRN  dextrose 5%. 1000 milliLiter(s) IV Continuous <Continuous>  dextrose 50% Injectable 12.5 Gram(s) IV Push once  dextrose 50% Injectable 25 Gram(s) IV Push once  dextrose 50% Injectable 25 Gram(s) IV Push once  furosemide    Tablet 80 milliGRAM(s) Oral daily  glucagon  Injectable 1 milliGRAM(s) IntraMuscular once PRN  influenza   Vaccine 0.5 milliLiter(s) IntraMuscular once  insulin glargine Injectable (LANTUS) 75 Unit(s) SubCutaneous at bedtime  insulin lispro (HumaLOG) corrective regimen sliding scale   SubCutaneous three times a day before meals  insulin lispro (HumaLOG) corrective regimen sliding scale   SubCutaneous at bedtime  insulin lispro Injectable (HumaLOG) 30 Unit(s) SubCutaneous three times a day with meals  loratadine 10 milliGRAM(s) Oral daily  metoprolol succinate ER 50 milliGRAM(s) Oral every 12 hours  pantoprazole    Tablet 40 milliGRAM(s) Oral before breakfast  sacubitril 49 mG/valsartan 51 mG 1 Tablet(s) Oral two times a day  tamsulosin 0.4 milliGRAM(s) Oral at bedtime    PAST MEDICAL & SURGICAL HISTORY:  H/O gastroesophageal reflux (GERD)    History of COPD    History of ischemic cardiomyopathy    Heart failure with reduced ejection fraction    Hypertension    AICD (automatic cardioverter/defibrillator) present    Diabetes    Stented coronary artery    H/O vasectomy  20 yrs ago (2000)      Vitals:  T(C): 36.3 (09-15-20 @ 17:30), Max: 36.4 (09-14-20 @ 22:24)  HR: 89 (09-15-20 @ 17:30) (82 - 90)  BP: 104/66 (09-15-20 @ 17:30) (98/66 - 112/70)  BP(mean): --  RR: 18 (09-15-20 @ 17:30) (18 - 18)  SpO2: 98% (09-15-20 @ 17:30) (95% - 98%)  Wt(kg): --  Daily     Daily   I&O's Summary    14 Sep 2020 07:01  -  15 Sep 2020 07:00  --------------------------------------------------------  IN: 900 mL / OUT: 350 mL / NET: 550 mL    15 Sep 2020 07:01  -  15 Sep 2020 20:11  --------------------------------------------------------  IN: 460 mL / OUT: 800 mL / NET: -340 mL        Physical Exam:  Appearance: Normal, well groomed, NAD  Eyes: PERRLA, EOMI, pink conjunctiva, no scleral icterus   HENT: Normal oral mucosa  Cardiovascular: RRR, S1, S2, no murmur, rub, or gallop; +edema; +JVD  Procedural access site: clean, dry, intact without hematoma  Respiratory: diminished breath sounds throughout  Gastrointestinal: Soft, non-tender, non-distended, BS+  Musculoskeletal: No clubbing or joint deformity   Neurologic: No focal weakness  Lymphatic: No lymphadenopathy  Psychiatry: AAOx3 with appropriate mood and affect  Skin: No rashes, ecchymoses, or cyanosis; +tattoos                           13.2   9.24  )-----------( 244      ( 15 Sep 2020 09:12 )             42.0     09-15    138  |  99  |  31<H>  ----------------------------<  245<H>  3.7   |  24  |  1.52<H>    Ca    9.9      15 Sep 2020 09:12  Mg     2.2     09-15      PT/INR - ( 15 Sep 2020 09:12 )   PT: 13.0 sec;   INR: 1.10 ratio         PTT - ( 15 Sep 2020 09:12 )  PTT:31.3 sec      Serum Pro-Brain Natriuretic Peptide: 1158 pg/mL (09-08 @ 20:42)

## 2020-09-15 NOTE — PROGRESS NOTE ADULT - ASSESSMENT
Assessment: 59M PMH T2DM, HTN, CAD/MI s/p stents (most recent 2/2018), ICM w/ HFrEF (10-15%) s/p AICD, COPD (not on home O2), HTN, GERD, BPH, who is admitted for sepsis 2/2 gram positive bacteremia.   s/p MICU stay       # Strep Gallolyticus bacteremia  -Continue with Ceftriaxone   -MRI Lumbar spine findings with no evidence of osteomyelitis   -Screening colonoscopy on Thursday   -      # Hypotension secondary to Septic Shock  - resolved, off Midodrine   - continue furosemide, Metoprolol    Diuresing as needed    # HFrEF s/p AICD  - most recent echo from 12/2019: EF 10-15% sev global LV systolic dysfunction. eccentric LVH  - cardiology following   - resumed b-blocker but holding onto rest of HF meds.  - s/p AICD extraction in setting of Group D Strep Bacteremia  -Plan for AICD placement am tomorrow     # CAD s/p stent  - c/w home DAPT (ASA off Plavix fro colonoscopy on Thursday.   -High dose statin.      GI/NUTRITION  - GI PCR negative.   - Continuing home pantoprazole po 40mg  - diabetic diet.  - GI consulted, recommend patient to be off Plavix for 5 days for colonoscopy.       - MANIJT improving -   - continuing home tamsulosin 0.4mg  - VOD trial   -Renal ultrasound       Hematologic  - Monitor CBC, currently stable    #DVT ppx  - SCDs for DVT ppx    # Hyperglycemia   - Pt w/ hx of IDDM on home Lantus 66u qhs and Humalog 18 units pre meal   - Will monitor FSS with moderate ISS    Ethics  - GOC discussed Patient wants full code.

## 2020-09-15 NOTE — PROGRESS NOTE ADULT - ASSESSMENT
Assessment  DMT2: 59y Male with DM T2 with hyperglycemia, A1C 11.2%, on basal bolus insulin, increased dose yesterday, blood sugars overall improving though still running high, insulin being held 2/2 NPO status, no hypoglycemic episodes, now eating meals. Planning ICD and eventual colonoscopy.  Septic Shock: Hydronephrosis, On IV ABx, stable, monitored.  Obesity: No strict exercise routines, not on any weight loss program, neither on low calorie diet.          Cortney Flanagan MD  Cell: [phone]  Office: [phone]               Assessment  DMT2:  59y Male with DM T2 with hyperglycemia, A1C 11.2%, on basal bolus insulin, increased dose yesterday, blood sugars overall improving though still running high, insulin being held 2/2 NPO status, no hypoglycemic episodes, now eating meals. Planning ICD and eventual colonoscopy.  Septic Shock: Hydronephrosis, On IV ABx, stable, monitored.  Obesity: No strict exercise routines, not on any weight loss program, neither on low calorie diet.          Cortney Flanagan MD  Cell: [phone]  Office: [phone]

## 2020-09-15 NOTE — PROGRESS NOTE ADULT - ASSESSMENT
ASSESSMENT/RECOMMENDATIONS:  59M PMH T2DM(a1c=11.2%), HTN, CAD/MI s/p stents (most recent 2/2018), HFrEF (10-15%), ICD, ischemic cardiomyopathy, COPD, HTN, GERD pw fever, n/v, back pain x 1 day. Pt states that he was in his baseline state of health when he began to experience back pain while grocery shopping yesterday. He describes sudden onset left lower back pain, palliated by sitting upright, of sharp quality, without radiation, of 7/10 severity, which completely resolved when presenting to ED.     VS: febrile 102.9, HR , BP 63/40 to 105/62, RR 17-25, SpO2%  on RA  Labs: significant for WBC 10.28, thrombocytopenia 146, elevated BUN/SCr 32/1.88, hyperglycemic 247, elevated proBNP 1158, lactate wnl  Micro: COVID-19 negative, RVP negative, UA 0 WBC and negative for bacteria. GI PCR negative. BCx: Strep by PCR.  CT: No pneumonia. Emphysema. No bowel or obstruction. Hepatomegaly and diffuse hepatic steatosis. Splenomegaly. Atrophic right kidney, with severe hydronephrosis. A 0.7 cm soft tissue density is noted in the interpolar region of the right kidney and is incompletely characterized. Ultrasound may be helpful for further evaluation.  Pt admitted to MICU for management of hypotension requiring pressor support. While in ICU experienced emesis and diarrhea (without melena/hematochezia) x 1 with SOB.     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    Overall bacteremia, strep infection.       Plan:   continue Rocephin at current dose.   GI on board, plan for colonoscopy Wednesday   MRI with possible early Osteomyelitis, no extension into epidural space, reviewed personally with neuroradiology, therefore will likely need a 6 week course of abs  follow up blood cultures so far NGTD  ICD lead cx NTD   Cr  remains elevated   follow ESR and CRP

## 2020-09-15 NOTE — PROGRESS NOTE ADULT - SUBJECTIVE AND OBJECTIVE BOX
24H hour events: no complaints , denies chest pain, SOB, palpitation , no events on telemetry     MEDICATIONS:  aspirin  chewable 81 milliGRAM(s) Oral daily  furosemide    Tablet 80 milliGRAM(s) Oral daily  metoprolol succinate ER 50 milliGRAM(s) Oral every 12 hours  sacubitril 49 mG/valsartan 51 mG 1 Tablet(s) Oral two times a day  tamsulosin 0.4 milliGRAM(s) Oral at bedtime    cefTRIAXone   IVPB 2000 milliGRAM(s) IV Intermittent every 24 hours    ALBUTerol    90 MICROgram(s) HFA Inhaler 2 Puff(s) Inhalation every 12 hours PRN  loratadine 10 milliGRAM(s) Oral daily    acetaminophen   Tablet .. 650 milliGRAM(s) Oral every 6 hours PRN    pantoprazole    Tablet 40 milliGRAM(s) Oral before breakfast    atorvastatin 80 milliGRAM(s) Oral at bedtime  dextrose 40% Gel 15 Gram(s) Oral once PRN  dextrose 50% Injectable 12.5 Gram(s) IV Push once  dextrose 50% Injectable 25 Gram(s) IV Push once  dextrose 50% Injectable 25 Gram(s) IV Push once  glucagon  Injectable 1 milliGRAM(s) IntraMuscular once PRN  insulin glargine Injectable (LANTUS) 75 Unit(s) SubCutaneous at bedtime  insulin lispro (HumaLOG) corrective regimen sliding scale   SubCutaneous three times a day before meals  insulin lispro (HumaLOG) corrective regimen sliding scale   SubCutaneous at bedtime  insulin lispro Injectable (HumaLOG) 30 Unit(s) SubCutaneous three times a day with meals    chlorhexidine 4% Liquid 1 Application(s) Topical <User Schedule>  dextrose 5%. 1000 milliLiter(s) IV Continuous <Continuous>  influenza   Vaccine 0.5 milliLiter(s) IntraMuscular once      REVIEW OF SYSTEMS:  See HPI, otherwise ROS negative.    PHYSICAL EXAM:  T(C): 36.4 (09-15-20 @ 03:57), Max: 36.6 (09-14-20 @ 12:33)  HR: 88 (09-15-20 @ 08:44) (85 - 98)  BP: 103/69 (09-15-20 @ 08:44) (103/69 - 118/80)  RR: 18 (09-15-20 @ 08:44) (16 - 18)  SpO2: 95% (09-15-20 @ 08:44) (95% - 96%)  I&O's Summary    14 Sep 2020 07:01  -  15 Sep 2020 07:00  --------------------------------------------------------  IN: 900 mL / OUT: 350 mL / NET: 550 mL    Appearance: Alert. NAD, OOB ambulating in hallway  Cardiovascular: +S1S2 RRR no m/g/r  Respiratory: CTA B/L	  Psychiatry: A & O x 3, Mood & affect appropriate  Gastrointestinal:  obese abdomen Soft, NT. ND. +BS	  Skin: left infraclavicular incision line flat, no bleeding   Neurologic: Non-focal  Extremities: No edema BLE  Vascular: Peripheral pulses palpable 2+ bilaterally    LABS:	 	    CBC Full  -  ( 15 Sep 2020 09:12 )  WBC Count : 9.24 K/uL  Hemoglobin : 13.2 g/dL  Hematocrit : 42.0 %  Platelet Count - Automated : 244 K/uL  Mean Cell Volume : 81.2 fl  Mean Cell Hemoglobin : 25.5 pg  Mean Cell Hemoglobin Concentration : 31.4 gm/dL  Auto Neutrophil # : x  Auto Lymphocyte # : x  Auto Monocyte # : x  Auto Eosinophil # : x  Auto Basophil # : x  Auto Neutrophil % : x  Auto Lymphocyte % : x  Auto Monocyte % : x  Auto Eosinophil % : x  Auto Basophil % : x    09-15    138  |  99  |  31<H>  ----------------------------<  245<H>  3.7   |  24  |  1.52<H>  09-14    140  |  98  |  38<H>  ----------------------------<  229<H>  3.5   |  26  |  1.67<H>    Ca    9.9      15 Sep 2020 09:12  Ca    10.0      14 Sep 2020 07:36  Mg     2.2     09-15    TELEMETRY: SR 80 -110 bpm   	    RADIOLOGY:  ec< from: TTE with Doppler (w/Cont) (09.09.20 @ 05:44) >  ------------------------------------------------------------------------  Dimensions:    Normal Values:  LA:            2.0 - 4.0 cm  Ao:            2.0 - 3.8 cm  SEPTUM:        0.6 - 1.2 cm  PWT:           0.6 - 1.1 cm  LVIDd:     3.0 - 5.6 cm  LVIDs:         1.8 - 4.0 cm  EF (Visual Estimate): 15-20 %  ------------------------------------------------------------------------  Observations:  Mitral Valve: Tethered mitral valve leaflets with normal  opening-not well visualized. Minimal mitral regurgitation.  Aortic Valve/Aorta: Aortic valve not well visualized;  appears calcified. Peak left ventricular outflow tract  gradient equals 1 mm Hg, LVOT velocity time integral equals  8 cm.  Not well visualized.  Left Atrium: Left atrium not well visualized, probably  normal.  Left Ventricle: Endocardial visualization enhanced with  intravenous injection of Ultrasonic Enhancing Agent  (Definity).  Severe global left ventricular systolic  dysfunction.No obvious LV thrombus. Estimated EF of 15-20%.  No left ventricular thrombus. Left ventricular enlargement.  Severe diastolic dysfunction (Stage III).  Right Heart: A device wire is noted in the right heart. The  right ventricle is not well visualized. Tricuspid valve not  well visualized. No tricuspid regurgitation. Pulmonic valve  not well visualized.  Pericardium/Pleura: Normal pericardium with no pericardial  effusion.  Hemodynamic: Estimated right atrial pressure is 8 mm Hg.  ------------------------------------------------------------------------  Conclusions:  1. Aortic valve not well visualized; appears calcified.  2. Left ventricular enlargement.  3. Endocardial visualization enhanced with intravenous  injection of Ultrasonic Enhancing Agent (Definity).  Severe  global left ventricular systolic dysfunction.No obvious LV  thrombus. Estimated EF of 15-20%. No left ventricular  thrombus.  4. Severe diastolic dysfunction (Stage III).  5. A device wire is noted in the right heart.  6. Tricuspid valve not well visualized. No tricuspid  regurgitation.  7. Pulmonic valve not well visualized.  Unable to rule out endocarditis. Consider DONNIE if clinically  indicated.  ------------------------------------------------------------------------  Confirmed on  9/10/2020 - 11:09:29 by Raza Kraft M.D.  ------------------------------------------------------------------------    ri< from: MR Lumbar Spine w/wo IV Cont (09.14.20 @ 22:52) >    EXAM:  MR SPINE LUMBAR WAW IC                            PROCEDURE DATE:  09/14/2020            INTERPRETATION:  INDICATIONS:  Back pain, rule out osteomyelitis as per ID    TECHNIQUE:  Sagittal T1 weighted and T2 weighted images of the lumbosacralspine as well as axial T1 weighted and T2 weighted images were obtained.    COMPARISON EXAMINATION: None.    FINDINGS:  VERTEBRAL BODIES AND DISCS:  Normal.  ALIGNMENT:  No subluxations.  L1-L2 LEVEL:  Normal.  L2-L3 LEVEL:  Normal.  L3-L4 LEVEL:  Normal.  L4-L5 LEVEL: Fatty endplate change at the L4-5 level. In addition on T2 there is disc space hyperintensity at this level. There is evidence of enhancement at the level of the disc space with some subtle extension into the endplate Asymmetric bulge to the right with mass impression on the right ventral aspect of the thecal sac.  L5-S1 LEVEL:  Central disc protrusion indents the ventral aspect of the thecal sac  SPINAL CANAL:  No other intradural or extradural defects are seen.  CONUS MEDULLARIS:  Normal.  MISCELLANEOUS:  None.    IMPRESSION:  Fatty endplate change at the L4-5 level. In addition, on T2 there is disc space hyperintensity at this level. There is evidence of enhancement at the level of the disc space with some subtle extension into the endplate. As the degenerative changes appear to be most significant at this level findings likely relate to degenerative change. However, cannot entirely exclude infection recommend correlation with clinical parameters.  MICHAEL MOREIRA M.D., ATTENDING RADIOLOGIST  This document has been electronically signed. Sep 15

## 2020-09-15 NOTE — PROGRESS NOTE ADULT - ASSESSMENT
59 year old morbidly obese male PMH COPD (not on home O2) T2DM, HTN, CAD/MI s/p stents (most recent 2/2018), ICM w/ HFrEF (10-15%) s/p primary prevention STJ VVI ICD, HTN, GERD, BPH, who was admitted for sepsis 2/2 gram positive bacteremia.  Admitted to MICU for hypotension, requiring pressors. now s/p ICD extraction 9/10    1. ICM EF 10-15%  2. Streptococcus Gallolyticus Bacteremia     s/p single chamber ICD 9/10/20 extraction   afebrile, normal WBC, blood cultures remain NGTD 9/10/20  continue antibiotics per ID  SC ICD screening done by representative, patient screened in for SC ICD   keep NPO currently awaiting ID clearance for  possible SC ICD today   MRI lumbar spine by ID to r/o OM   also planned this week by GI colonoscopy to rule out colon cancer as source for Streptococcus  Gallolyticus  Bacteremia     [phone]         59 year old morbidly obese male PMH COPD (not on home O2) T2DM, HTN, CAD/MI s/p stents (most recent 2/2018), ICM w/ HFrEF (10-15%) s/p primary prevention STJ VVI ICD, HTN, GERD, BPH, who was admitted for sepsis 2/2 gram positive bacteremia.  Admitted to MICU for hypotension, requiring pressors. now s/p ICD extraction 9/10    1. ICM EF 10-15%  2. Streptococcus Gallolyticus Bacteremia     s/p single chamber ICD 9/10/20 extraction   afebrile, normal WBC, blood cultures remain NGTD 9/10/20  continue antibiotics per ID  SC ICD screening done by representative, patient screened in for SC ICD   keep NPO currently awaiting ID clearance for  possible SC ICD today   MRI lumbar spine by ID to r/o OM   also planned this week by GI colonoscopy to rule out colon cancer as source for Streptococcus  Gallolyticus  Bacteremia     [phone]  addendum   patient to have colonoscopy first then SC ICD implant at end of the week

## 2020-09-15 NOTE — PROGRESS NOTE ADULT - SUBJECTIVE AND OBJECTIVE BOX
Patient is a 59y old  Male who presents with a chief complaint of fever, cough, emesis (15 Sep 2020 09:00)    Being followed by ID for        Interval history:  pt's back is stable  pt is sleeping in chair  pt upset about scheduling of tests  No other acute events    PAST MEDICAL & SURGICAL HISTORY:  H/O gastroesophageal reflux (GERD)    History of COPD    History of ischemic cardiomyopathy    Heart failure with reduced ejection fraction    Hypertension    AICD (automatic cardioverter/defibrillator) present    Diabetes    Stented coronary artery    H/O vasectomy  20 yrs ago (2000)      Allergies    No Known Allergies    Intolerances      Antimicrobials:    cefTRIAXone   IVPB 2000 milliGRAM(s) IV Intermittent every 24 hours    MEDICATIONS  (STANDING):  aspirin  chewable 81 milliGRAM(s) Oral daily  atorvastatin 80 milliGRAM(s) Oral at bedtime  cefTRIAXone   IVPB 2000 milliGRAM(s) IV Intermittent every 24 hours  chlorhexidine 4% Liquid 1 Application(s) Topical <User Schedule>  dextrose 5%. 1000 milliLiter(s) (50 mL/Hr) IV Continuous <Continuous>  dextrose 50% Injectable 12.5 Gram(s) IV Push once  dextrose 50% Injectable 25 Gram(s) IV Push once  dextrose 50% Injectable 25 Gram(s) IV Push once  furosemide    Tablet 80 milliGRAM(s) Oral daily  influenza   Vaccine 0.5 milliLiter(s) IntraMuscular once  insulin glargine Injectable (LANTUS) 75 Unit(s) SubCutaneous at bedtime  insulin lispro (HumaLOG) corrective regimen sliding scale   SubCutaneous three times a day before meals  insulin lispro (HumaLOG) corrective regimen sliding scale   SubCutaneous at bedtime  insulin lispro Injectable (HumaLOG) 30 Unit(s) SubCutaneous three times a day with meals  loratadine 10 milliGRAM(s) Oral daily  metoprolol succinate ER 50 milliGRAM(s) Oral every 12 hours  pantoprazole    Tablet 40 milliGRAM(s) Oral before breakfast  sacubitril 49 mG/valsartan 51 mG 1 Tablet(s) Oral two times a day  tamsulosin 0.4 milliGRAM(s) Oral at bedtime      Vital Signs Last 24 Hrs  T(C): 36.4 (09-15-20 @ 12:11), Max: 36.4 (09-14-20 @ 22:24)  T(F): 97.6 (09-15-20 @ 12:11), Max: 97.6 (09-14-20 @ 22:24)  HR: 82 (09-15-20 @ 12:11) (82 - 98)  BP: 98/66 (09-15-20 @ 12:11) (98/66 - 112/70)  BP(mean): --  RR: 18 (09-15-20 @ 12:11) (16 - 18)  SpO2: 96% (09-15-20 @ 12:11) (95% - 96%)    Physical Exam:    Constitutional pt nontoxic    HEENT PERRLA EOMI,No pallor or icterus    No oral exudate or erythema    Neck supple no JVD or LN    Chest Good AE,CTA    CVS S1S2    Abd soft BS normal No tenderness     Ext No cyanosis clubbing or edema    IV site no erythema tenderness or discharge    Joints no swelling or LOM    CNS AAO X 3 no focal    skin- patches of dry scaley skin left arm- pt notes h/o eczema    Lab Data:                          13.2   9.24  )-----------( 244      ( 15 Sep 2020 09:12 )             42.0       09-15    138  |  99  |  31<H>  ----------------------------<  245<H>  3.7   |  24  |  1.52<H>    Ca    9.9      15 Sep 2020 09:12  Mg     2.2     09-15            .Blood Blood-Peripheral  09-14-20   No growth to date.  --  --      .Surgical Swab icd lead  09-10-20   No growth  --  --      .Urine Bladder (from O.R.)  09-10-20   No growth  --  --      .Blood Blood-Peripheral  09-10-20   No Growth Final  --  --      .Blood Blood  09-09-20   No Growth Final  --  --      .Stool Feces sarina jimenezir  09-09-20   GI PCR Results: NOT detected  *******Please Note:*******  GI panel PCR evaluates for:  Campylobacter, Plesiomonas shigelloides, Salmonella,  Vibrio, Yersinia enterocolitica, Enteroaggregative  Escherichia coli (EAEC), Enteropathogenic E.coli (EPEC),  Enterotoxigenic E. coli (ETEC) lt/st, Shiga-like  toxin-producing E. coli (STEC) stx1/stx2,  Shigella/ Enteroinvasive E. coli (EIEC), Cryptosporidium,  Cyclospora cayetanensis, Entamoeba histolytica,  Giardia lamblia, Adenovirus F 40/41, Astrovirus,  Norovirus GI/GII, Rotavirus A, Sapovirus  --  --      .Urine Clean Catch (Midstream)  09-09-20   <10,000 CFU/mL Normal Urogenital Dorothy  --  --      .Blood Blood-Peripheral  09-09-20   Growth in aerobic and anaerobic bottles: Streptococcus gallolyticus  "Due to technical problems, Proteus sp. will Not be reported as part of  the BCID panel until further notice"  ***Blood Panel PCR results on this specimen are available  approximately 3 hours after the Gram stain result.***  Gram stain, PCR, and/or culture results may not always  correspond due to difference in methodologies.  ************************************************************  This PCR assay was performed using [employer].  The following targets are tested for: Enterococcus,  vancomycin resistant enterococci, Listeria monocytogenes,  coagulase negative staphylococci, S. aureus,  methicillin resistant S. aureus, Streptococcus agalactiae  (Group B), S. pneumoniae, S. pyogenes (Group A),  Acinetobacter baumannii, Enterobacter cloacae, E. coli,  Klebsiella oxytoca, K. pneumoniae, Proteus sp.,  Serratia marcescens, Haemophilus influenzae,  Neisseria meningitidis, Pseudomonas aeruginosa, Candida  albicans, C. glabrata, C krusei, C parapsilosis,  C. tropicalis and the KPC resistance gene.  --  Blood Culture PCR  Streptococcus gallolyticus      < from: MR Lumbar Spine w/wo IV Cont (09.14.20 @ 22:52) >    EXAM:  MR SPINE LUMBAR WAW IC                            PROCEDURE DATE:  09/14/2020            INTERPRETATION:  INDICATIONS:  Back pain, rule out osteomyelitis as per ID    TECHNIQUE:  Sagittal T1 weighted and T2 weighted images of the lumbosacralspine as well as axial T1 weighted and T2 weighted images were obtained.    COMPARISON EXAMINATION: None.    FINDINGS:  VERTEBRAL BODIES AND DISCS:  Normal.  ALIGNMENT:  No subluxations.  L1-L2 LEVEL:  Normal.  L2-L3 LEVEL:  Normal.  L3-L4 LEVEL:  Normal.  L4-L5 LEVEL: Fatty endplate change at the L4-5 level. In addition on T2 there is disc space hyperintensity at this level. There is evidence of enhancement at the level of the disc space with some subtle extension into the endplate Asymmetric bulge to the right with mass impression on the right ventral aspect of the thecal sac.  L5-S1 LEVEL:  Central disc protrusion indents the ventral aspect of the thecal sac  SPINAL CANAL:  No other intradural or extradural defects are seen.  CONUS MEDULLARIS:  Normal.  MISCELLANEOUS:  None.    IMPRESSION:  Fatty endplate change at the L4-5 level. In addition, on T2 there is disc space hyperintensity at this level. There is evidence of enhancement at the level of the disc space with some subtle extension into the endplate. As the degenerative changes appear to be most significant at this level findings likely relate to degenerative change. However, cannot entirely exclude infection recommend correlation with clinical parameters.                  MICHAEL MOREIRA M.D., ATTENDING RADIOLOGIST  This document has been electronically signed. Sep 15 2020 10:12AM    < end of copied text >                WBC Count: 9.24 (09-15-20 @ 09:12)  WBC Count: 8.54 (09-14-20 @ 07:36)  WBC Count: 6.15 (09-13-20 @ 07:12)  WBC Count: 7.07 (09-12-20 @ 06:04)  WBC Count: 8.42 (09-11-20 @ 00:27)  WBC Count: 8.59 (09-10-20 @ 00:24)  WBC Count: 11.16 (09-09-20 @ 04:33)  WBC Count: 10.28 (09-08-20 @ 20:42)

## 2020-09-15 NOTE — PROGRESS NOTE ADULT - SUBJECTIVE AND OBJECTIVE BOX
Chief complaint  Patient is a 59y old  Male who presents with a chief complaint of fever, cough, emesis (15 Sep 2020 13:29)   Review of systems  Patient in chair, NPO, looks comfortable, no hypoglycemic episodes.    Labs and Fingersticks  CAPILLARY BLOOD GLUCOSE      POCT Blood Glucose.: 242 mg/dL (15 Sep 2020 12:13)  POCT Blood Glucose.: 241 mg/dL (15 Sep 2020 08:05)  POCT Blood Glucose.: 250 mg/dL (14 Sep 2020 22:27)  POCT Blood Glucose.: 127 mg/dL (14 Sep 2020 16:50)      Anion Gap, Serum: 15 (09-15 @ 09:12)  Anion Gap, Serum: 16 (09-14 @ 07:36)      Calcium, Total Serum: 9.9 (09-15 @ 09:12)  Calcium, Total Serum: 10.0 (09-14 @ 07:36)          09-15    138  |  99  |  31<H>  ----------------------------<  245<H>  3.7   |  24  |  1.52<H>    Ca    9.9      15 Sep 2020 09:12  Mg     2.2     09-15                          13.2   9.24  )-----------( 244      ( 15 Sep 2020 09:12 )             42.0     Medications  MEDICATIONS  (STANDING):  aspirin  chewable 81 milliGRAM(s) Oral daily  atorvastatin 80 milliGRAM(s) Oral at bedtime  cefTRIAXone   IVPB 2000 milliGRAM(s) IV Intermittent every 24 hours  chlorhexidine 4% Liquid 1 Application(s) Topical <User Schedule>  dextrose 5%. 1000 milliLiter(s) (50 mL/Hr) IV Continuous <Continuous>  dextrose 50% Injectable 12.5 Gram(s) IV Push once  dextrose 50% Injectable 25 Gram(s) IV Push once  dextrose 50% Injectable 25 Gram(s) IV Push once  furosemide    Tablet 80 milliGRAM(s) Oral daily  influenza   Vaccine 0.5 milliLiter(s) IntraMuscular once  insulin glargine Injectable (LANTUS) 75 Unit(s) SubCutaneous at bedtime  insulin lispro (HumaLOG) corrective regimen sliding scale   SubCutaneous three times a day before meals  insulin lispro (HumaLOG) corrective regimen sliding scale   SubCutaneous at bedtime  insulin lispro Injectable (HumaLOG) 30 Unit(s) SubCutaneous three times a day with meals  loratadine 10 milliGRAM(s) Oral daily  metoprolol succinate ER 50 milliGRAM(s) Oral every 12 hours  pantoprazole    Tablet 40 milliGRAM(s) Oral before breakfast  sacubitril 49 mG/valsartan 51 mG 1 Tablet(s) Oral two times a day  tamsulosin 0.4 milliGRAM(s) Oral at bedtime      Physical Exam  General: Patient comfortable in bed  Vital Signs Last 12 Hrs  T(F): 97.6 (09-15-20 @ 12:11), Max: 97.6 (09-15-20 @ 12:11)  HR: 82 (09-15-20 @ 12:11) (82 - 88)  BP: 98/66 (09-15-20 @ 12:11) (98/66 - 110/70)  BP(mean): --  RR: 18 (09-15-20 @ 12:11) (18 - 18)  SpO2: 96% (09-15-20 @ 12:11) (95% - 96%)  Neck: No palpable thyroid nodules.  CVS: S1S2, No murmurs  Respiratory: No wheezing, no crepitations  GI: Abdomen soft, bowel sounds positive  Musculoskeletal:  edema lower extremities.   Skin: No skin rashes, no ecchymosis    Diagnostics    A1C with Estimated Average Glucose: Routine (09-09 @ 13:22)           Chief complaint  Patient is a 59y old  Male who presents with a chief complaint of fever, cough, emesis (15 Sep 2020 13:29)   Review of systems  Patient in chair, NPO, looks comfortable, no hypoglycemic episodes.    Labs and Fingersticks  CAPILLARY BLOOD GLUCOSE    POCT Blood Glucose.: 242 mg/dL (15 Sep 2020 12:13)  POCT Blood Glucose.: 241 mg/dL (15 Sep 2020 08:05)  POCT Blood Glucose.: 250 mg/dL (14 Sep 2020 22:27)  POCT Blood Glucose.: 127 mg/dL (14 Sep 2020 16:50)      Anion Gap, Serum: 15 (09-15 @ 09:12)  Anion Gap, Serum: 16 (09-14 @ 07:36)      Calcium, Total Serum: 9.9 (09-15 @ 09:12)  Calcium, Total Serum: 10.0 (09-14 @ 07:36)          09-15    138  |  99  |  31<H>  ----------------------------<  245<H>  3.7   |  24  |  1.52<H>    Ca    9.9      15 Sep 2020 09:12  Mg     2.2     09-15                          13.2   9.24  )-----------( 244      ( 15 Sep 2020 09:12 )             42.0     Medications  MEDICATIONS  (STANDING):  aspirin  chewable 81 milliGRAM(s) Oral daily  atorvastatin 80 milliGRAM(s) Oral at bedtime  cefTRIAXone   IVPB 2000 milliGRAM(s) IV Intermittent every 24 hours  chlorhexidine 4% Liquid 1 Application(s) Topical <User Schedule>  dextrose 5%. 1000 milliLiter(s) (50 mL/Hr) IV Continuous <Continuous>  dextrose 50% Injectable 12.5 Gram(s) IV Push once  dextrose 50% Injectable 25 Gram(s) IV Push once  dextrose 50% Injectable 25 Gram(s) IV Push once  furosemide    Tablet 80 milliGRAM(s) Oral daily  influenza   Vaccine 0.5 milliLiter(s) IntraMuscular once  insulin glargine Injectable (LANTUS) 75 Unit(s) SubCutaneous at bedtime  insulin lispro (HumaLOG) corrective regimen sliding scale   SubCutaneous three times a day before meals  insulin lispro (HumaLOG) corrective regimen sliding scale   SubCutaneous at bedtime  insulin lispro Injectable (HumaLOG) 30 Unit(s) SubCutaneous three times a day with meals  loratadine 10 milliGRAM(s) Oral daily  metoprolol succinate ER 50 milliGRAM(s) Oral every 12 hours  pantoprazole    Tablet 40 milliGRAM(s) Oral before breakfast  sacubitril 49 mG/valsartan 51 mG 1 Tablet(s) Oral two times a day  tamsulosin 0.4 milliGRAM(s) Oral at bedtime      Physical Exam  General: Patient comfortable in bed  Vital Signs Last 12 Hrs  T(F): 97.6 (09-15-20 @ 12:11), Max: 97.6 (09-15-20 @ 12:11)  HR: 82 (09-15-20 @ 12:11) (82 - 88)  BP: 98/66 (09-15-20 @ 12:11) (98/66 - 110/70)  BP(mean): --  RR: 18 (09-15-20 @ 12:11) (18 - 18)  SpO2: 96% (09-15-20 @ 12:11) (95% - 96%)  Neck: No palpable thyroid nodules.  CVS: S1S2, No murmurs  Respiratory: No wheezing, no crepitations  GI: Abdomen soft, bowel sounds positive  Musculoskeletal:  edema lower extremities.   Skin: No skin rashes, no ecchymosis    Diagnostics    A1C with Estimated Average Glucose: Routine (09-09 @ 13:22)

## 2020-09-16 LAB
GLUCOSE BLDC GLUCOMTR-MCNC: 109 MG/DL — HIGH (ref 70–99)
GLUCOSE BLDC GLUCOMTR-MCNC: 200 MG/DL — HIGH (ref 70–99)
GLUCOSE BLDC GLUCOMTR-MCNC: 239 MG/DL — HIGH (ref 70–99)
GLUCOSE BLDC GLUCOMTR-MCNC: 76 MG/DL — SIGNIFICANT CHANGE UP (ref 70–99)
GLUCOSE BLDC GLUCOMTR-MCNC: 93 MG/DL — SIGNIFICANT CHANGE UP (ref 70–99)

## 2020-09-16 PROCEDURE — 99231 SBSQ HOSP IP/OBS SF/LOW 25: CPT

## 2020-09-16 PROCEDURE — 99233 SBSQ HOSP IP/OBS HIGH 50: CPT

## 2020-09-16 RX ORDER — INSULIN GLARGINE 100 [IU]/ML
80 INJECTION, SOLUTION SUBCUTANEOUS AT BEDTIME
Refills: 0 | Status: DISCONTINUED | OUTPATIENT
Start: 2020-09-16 | End: 2020-09-17

## 2020-09-16 RX ORDER — SOD SULF/SODIUM/NAHCO3/KCL/PEG
1000 SOLUTION, RECONSTITUTED, ORAL ORAL EVERY 4 HOURS
Refills: 0 | Status: COMPLETED | OUTPATIENT
Start: 2020-09-16 | End: 2020-09-16

## 2020-09-16 RX ORDER — INSULIN LISPRO 100/ML
32 VIAL (ML) SUBCUTANEOUS
Refills: 0 | Status: DISCONTINUED | OUTPATIENT
Start: 2020-09-16 | End: 2020-09-17

## 2020-09-16 RX ORDER — INSULIN LISPRO 100/ML
11 VIAL (ML) SUBCUTANEOUS ONCE
Refills: 0 | Status: COMPLETED | OUTPATIENT
Start: 2020-09-16 | End: 2020-09-16

## 2020-09-16 RX ORDER — CEFTRIAXONE 500 MG/1
2000 INJECTION, POWDER, FOR SOLUTION INTRAMUSCULAR; INTRAVENOUS EVERY 24 HOURS
Refills: 0 | Status: DISCONTINUED | OUTPATIENT
Start: 2020-09-16 | End: 2020-09-21

## 2020-09-16 RX ADMIN — PANTOPRAZOLE SODIUM 40 MILLIGRAM(S): 20 TABLET, DELAYED RELEASE ORAL at 05:55

## 2020-09-16 RX ADMIN — Medication 650 MILLIGRAM(S): at 02:30

## 2020-09-16 RX ADMIN — Medication 2: at 08:13

## 2020-09-16 RX ADMIN — Medication 81 MILLIGRAM(S): at 12:46

## 2020-09-16 RX ADMIN — Medication 1000 MILLILITER(S): at 21:49

## 2020-09-16 RX ADMIN — Medication 80 MILLIGRAM(S): at 05:59

## 2020-09-16 RX ADMIN — Medication 650 MILLIGRAM(S): at 02:00

## 2020-09-16 RX ADMIN — Medication 11 UNIT(S): at 17:52

## 2020-09-16 RX ADMIN — TAMSULOSIN HYDROCHLORIDE 0.4 MILLIGRAM(S): 0.4 CAPSULE ORAL at 22:08

## 2020-09-16 RX ADMIN — ATORVASTATIN CALCIUM 80 MILLIGRAM(S): 80 TABLET, FILM COATED ORAL at 22:08

## 2020-09-16 RX ADMIN — CHLORHEXIDINE GLUCONATE 1 APPLICATION(S): 213 SOLUTION TOPICAL at 05:59

## 2020-09-16 RX ADMIN — LORATADINE 10 MILLIGRAM(S): 10 TABLET ORAL at 12:46

## 2020-09-16 RX ADMIN — Medication 0: at 17:48

## 2020-09-16 RX ADMIN — Medication 4: at 12:46

## 2020-09-16 RX ADMIN — Medication 1000 MILLILITER(S): at 17:48

## 2020-09-16 RX ADMIN — CEFTRIAXONE 100 MILLIGRAM(S): 500 INJECTION, POWDER, FOR SOLUTION INTRAMUSCULAR; INTRAVENOUS at 20:00

## 2020-09-16 NOTE — CHART NOTE - NSCHARTNOTEFT_GEN_A_CORE
Plan for colonoscopy tmro.  - clear liquid diet today  - npo at midnight  - moviprep ordered by GI fellow  - check CBC, CMP, INR in the AM  - hold blood thinners

## 2020-09-16 NOTE — PROGRESS NOTE ADULT - ASSESSMENT
Assessment: 59M PMH T2DM, HTN, CAD/MI s/p stents (most recent 2/2018), ICM w/ HFrEF (10-15%) s/p AICD, COPD (not on home O2), HTN, GERD, BPH, who is admitted for sepsis 2/2 gram positive bacteremia.   s/p MICU stay       # Strep Gallolyticus bacteremia  -Continue with Ceftriaxone   -MRI Lumbar spine findings consistent with Acute Osteomyelitis.    -Screening colonoscopy on Thursday   -      # Hypotension secondary to Septic Shock  - resolved, off Midodrine   - continue furosemide, Metoprolol    Diuresing as needed    # HFrEF s/p AICD  - most recent echo from 12/2019: EF 10-15% sev global LV systolic dysfunction. eccentric LVH  - cardiology following   - resumed b-blocker but holding onto rest of HF meds.  - s/p AICD extraction in setting of Group D Strep Bacteremia  -Plan for AICD placement on Friday     # CAD s/p stent  - c/w home DAPT (ASA off Plavix fro colonoscopy on Thursday.   -High dose statin.      GI/NUTRITION  - GI PCR negative.   - Continuing home pantoprazole po 40mg  - diabetic diet.  - GI consulted, recommend patient to be off Plavix for 5 days for colonoscopy.       - MANJIT improving -   - continuing home tamsulosin 0.4mg      Hematologic  - Monitor CBC, currently stable    #DVT ppx  - SCDs for DVT ppx    # Hyperglycemia   - Pt w/ hx of IDDM on home Lantus 66u qhs and Humalog 18 units pre meal   - Will monitor FSS with moderate ISS    Ethics  - GOC discussed Patient wants full code.

## 2020-09-16 NOTE — PROGRESS NOTE ADULT - SUBJECTIVE AND OBJECTIVE BOX
HPI:  Patient seen and examined at bedside on 4 Suresh.  Clinically stable.  Awaiting S-ICD and colonoscopy.    Review Of Systems:           Respiratory: No shortness of breath, cough, or wheezing  Cardiovascular: No chest pain or palpitations  10 point review of systems is otherwise negative except as mentioned above        Medications:  acetaminophen   Tablet .. 650 milliGRAM(s) Oral every 6 hours PRN  ALBUTerol    90 MICROgram(s) HFA Inhaler 2 Puff(s) Inhalation every 12 hours PRN  aspirin  chewable 81 milliGRAM(s) Oral daily  atorvastatin 80 milliGRAM(s) Oral at bedtime  cefTRIAXone   IVPB 2000 milliGRAM(s) IV Intermittent every 24 hours  chlorhexidine 4% Liquid 1 Application(s) Topical <User Schedule>  dextrose 40% Gel 15 Gram(s) Oral once PRN  dextrose 5%. 1000 milliLiter(s) IV Continuous <Continuous>  dextrose 50% Injectable 12.5 Gram(s) IV Push once  dextrose 50% Injectable 25 Gram(s) IV Push once  dextrose 50% Injectable 25 Gram(s) IV Push once  furosemide    Tablet 80 milliGRAM(s) Oral daily  glucagon  Injectable 1 milliGRAM(s) IntraMuscular once PRN  influenza   Vaccine 0.5 milliLiter(s) IntraMuscular once  insulin glargine Injectable (LANTUS) 80 Unit(s) SubCutaneous at bedtime  insulin lispro (HumaLOG) corrective regimen sliding scale   SubCutaneous three times a day before meals  insulin lispro (HumaLOG) corrective regimen sliding scale   SubCutaneous at bedtime  insulin lispro Injectable (HumaLOG) 32 Unit(s) SubCutaneous three times a day with meals  loratadine 10 milliGRAM(s) Oral daily  metoprolol succinate ER 50 milliGRAM(s) Oral every 12 hours  pantoprazole    Tablet 40 milliGRAM(s) Oral before breakfast  polyethylene glycol/electrolyte Solution 1000 milliLiter(s) Oral every 4 hours  sacubitril 49 mG/valsartan 51 mG 1 Tablet(s) Oral two times a day  tamsulosin 0.4 milliGRAM(s) Oral at bedtime    PAST MEDICAL & SURGICAL HISTORY:  H/O gastroesophageal reflux (GERD)    History of COPD    History of ischemic cardiomyopathy    Heart failure with reduced ejection fraction    Hypertension    AICD (automatic cardioverter/defibrillator) present    Diabetes    Stented coronary artery    H/O vasectomy  20 yrs ago ()      Vitals:  T(C): 36.9 (20 @ 12:22), Max: 36.9 (20 @ 12:22)  HR: 84 (20 @ 12:22) (66 - 100)  BP: 102/69 (20 @ 12:22) (100/61 - 114/67)  BP(mean): --  RR: 18 (20 @ 12:22) (17 - 18)  SpO2: 97% (20 @ 12:22) (96% - 98%)  Wt(kg): --  Daily     Daily Weight in k.8 (16 Sep 2020 07:17)  I&O's Summary    15 Sep 2020 07:01  -  16 Sep 2020 07:00  --------------------------------------------------------  IN: 940 mL / OUT: 1600 mL / NET: -660 mL    16 Sep 2020 07:01  -  16 Sep 2020 19:07  --------------------------------------------------------  IN: 360 mL / OUT: 700 mL / NET: -340 mL        Physical Exam:  Appearance: Normal, well groomed, NAD  Eyes: PERRLA, EOMI, pink conjunctiva, no scleral icterus   HENT: Normal oral mucosa  Cardiovascular: RRR, S1, S2, no murmur, rub, or gallop; +edema; +JVD  Procedural access site: clean, dry, intact without hematoma  Respiratory: diminished breath sounds throughout  Gastrointestinal: Soft, non-tender, non-distended, BS+  Musculoskeletal: No clubbing or joint deformity   Neurologic: No focal weakness  Lymphatic: No lymphadenopathy  Psychiatry: AAOx3 with appropriate mood and affect  Skin: No rashes, ecchymoses, or cyanosis; +tattoos                           13.2   9.24  )-----------( 244      ( 15 Sep 2020 09:12 )             42.0     09-15    138  |  99  |  31<H>  ----------------------------<  245<H>  3.7   |  24  |  1.52<H>    Ca    9.9      15 Sep 2020 09:12  Mg     2.2     09-15      PT/INR - ( 15 Sep 2020 09:12 )   PT: 13.0 sec;   INR: 1.10 ratio         PTT - ( 15 Sep 2020 09:12 )  PTT:31.3 sec

## 2020-09-16 NOTE — PROGRESS NOTE ADULT - SUBJECTIVE AND OBJECTIVE BOX
INTERVAL HPI/OVERNIGHT EVENTS:   Patient ambulating well, no complaints   Anxious to go home.     MEDICATIONS  (STANDING):  aspirin  chewable 81 milliGRAM(s) Oral daily  atorvastatin 80 milliGRAM(s) Oral at bedtime  cefTRIAXone   IVPB 2000 milliGRAM(s) IV Intermittent every 24 hours  chlorhexidine 4% Liquid 1 Application(s) Topical <User Schedule>  dextrose 5%. 1000 milliLiter(s) (50 mL/Hr) IV Continuous <Continuous>  dextrose 50% Injectable 12.5 Gram(s) IV Push once  dextrose 50% Injectable 25 Gram(s) IV Push once  dextrose 50% Injectable 25 Gram(s) IV Push once  furosemide    Tablet 80 milliGRAM(s) Oral daily  influenza   Vaccine 0.5 milliLiter(s) IntraMuscular once  insulin glargine Injectable (LANTUS) 80 Unit(s) SubCutaneous at bedtime  insulin lispro (HumaLOG) corrective regimen sliding scale   SubCutaneous three times a day before meals  insulin lispro (HumaLOG) corrective regimen sliding scale   SubCutaneous at bedtime  insulin lispro Injectable (HumaLOG) 32 Unit(s) SubCutaneous three times a day with meals  loratadine 10 milliGRAM(s) Oral daily  metoprolol succinate ER 50 milliGRAM(s) Oral every 12 hours  pantoprazole    Tablet 40 milliGRAM(s) Oral before breakfast  polyethylene glycol/electrolyte Solution 1000 milliLiter(s) Oral every 4 hours  sacubitril 49 mG/valsartan 51 mG 1 Tablet(s) Oral two times a day  tamsulosin 0.4 milliGRAM(s) Oral at bedtime    MEDICATIONS  (PRN):  acetaminophen   Tablet .. 650 milliGRAM(s) Oral every 6 hours PRN Temp greater or equal to 38C (100.4F), Mild Pain (1 - 3)  ALBUTerol    90 MICROgram(s) HFA Inhaler 2 Puff(s) Inhalation every 12 hours PRN Shortness of Breath and/or Wheezing  dextrose 40% Gel 15 Gram(s) Oral once PRN Blood Glucose LESS THAN 70 milliGRAM(s)/deciliter  glucagon  Injectable 1 milliGRAM(s) IntraMuscular once PRN Glucose LESS THAN 70 milligrams/deciliter    PHYSICAL EXAM:    Constitutional: NAD. well-developed; well-groomed; well-nourished;  HEENT: AT/NC, PERRLA; EOMI, MMM, no oropharyngeal lesions, no erythema, no exudates,   Respiratory: CTAB. equal aeration bilaterally. no wheezing, no crackes, no rhonchi.   Cardiovascular: RRR, no M/R/G. no JVD  Gastrointestinal: soft; NT/ND, obese, +BS, no rebounding tenderness / guarding / HSM / mass / ascites.  Extremities: no clubbing; no cyanosis; 1+ LE edema to shins, non-tender to palpation, DP and Radial pulses intact.  Skin: warm and dry; color normal: no rash: no ulcers  Neurological: A&Ox 3; responds to pain; responds to verbal commands; CN nerves grossly intact.              `                                                    13.2   9.24  )-----------( 244      ( 15 Sep 2020 09:12 )             42.0             PT/INR - ( 15 Sep 2020 09:12 )   PT: 13.0 sec;   INR: 1.10 ratio         PTT - ( 15 Sep 2020 09:12 )  PTT:31.3 sec    GI PCR Panel, Stool (collected 09 Sep 2020 10:21)  Source: .Stool Feces sarina jose  Final Report (09 Sep 2020 12:58):    GI PCR Results: NOT detected    *******Please Note:*******    GI panel PCR evaluates for:    Campylobacter, Plesiomonas shigelloides, Salmonella,    Vibrio, Yersinia enterocolitica, Enteroaggregative    Escherichia coli (EAEC), Enteropathogenic E.coli (EPEC),    Enterotoxigenic E. coli (ETEC) lt/st, Shiga-like    toxin-producing E. coli (STEC) stx1/stx2,    Shigella/ Enteroinvasive E. coli (EIEC), Cryptosporidium,    Cyclospora cayetanensis, Entamoeba histolytica,    Giardia lamblia, Adenovirus F 40/41, Astrovirus,    Norovirus GI/GII, Rotavirus A, Sapovirus    Culture - Stool (09.09.20 @ 10:21)    Specimen Source: .Stool Feces  sarina jose    Culture Results:   No enteric pathogens to date: Final culture pending    Culture - Blood (09.09.20 @ 00:57)    Gram Stain:   Growth in aerobic and anaerobic bottles: Gram Positive Cocci in Pairs and  Chains    Specimen Source: .Blood Blood-Peripheral    Culture Results:   Growth in aerobic and anaerobic bottles: Streptococcus gallolyticus  See previous culture [id number]        IMAGING:      < from: CT Chest No Cont (09.09.20 @ 01:27) >  IMPRESSION:  No pneumonia.    Emphysema.    No bowel or obstruction.    Hepatomegaly and diffuse hepatic steatosis.    Splenomegaly.    Atrophic right kidney, with severe hydronephrosis. A 0.7 cm soft tissue density is noted in the interpolar region of the right kidney and is incompletely characterized. Ultrasound may be helpful for further evaluation.    < end of copied text >

## 2020-09-16 NOTE — PROGRESS NOTE ADULT - ASSESSMENT
ASSESSMENT/RECOMMENDATIONS:  59M PMH T2DM(a1c=11.2%), HTN, CAD/MI s/p stents (most recent 2/2018), HFrEF (10-15%), ICD, ischemic cardiomyopathy, COPD, HTN, GERD pw fever, n/v, back pain x 1 day. Pt states that he was in his baseline state of health when he began to experience back pain while grocery shopping yesterday. He describes sudden onset left lower back pain, palliated by sitting upright, of sharp quality, without radiation, of 7/10 severity, which completely resolved when presenting to ED.     VS: febrile 102.9, HR , BP 63/40 to 105/62, RR 17-25, SpO2%  on RA  Labs: significant for WBC 10.28, thrombocytopenia 146, elevated BUN/SCr 32/1.88, hyperglycemic 247, elevated proBNP 1158, lactate wnl  Micro: COVID-19 negative, RVP negative, UA 0 WBC and negative for bacteria. GI PCR negative. BCx: Strep by PCR.  CT: No pneumonia. Emphysema. No bowel or obstruction. Hepatomegaly and diffuse hepatic steatosis. Splenomegaly. Atrophic right kidney, with severe hydronephrosis. A 0.7 cm soft tissue density is noted in the interpolar region of the right kidney and is incompletely characterized. Ultrasound may be helpful for further evaluation.  Pt admitted to MICU for management of hypotension requiring pressor support. While in ICU experienced emesis and diarrhea (without melena/hematochezia) x 1 with SOB.     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    Overall bacteremia, strep infection.       Plan:   continue Rocephin at current dose.   GI on board, plan for colonoscopy tomorrow  MRI with possible early Osteomyelitis, no extension into epidural space, reviewed personally with neuroradiology, therefore will likely need a 6 week course of abs  follow up blood cultures so far NGTD  ICD lead cx NTD   Cr  remains elevated   follow ESR and CRP

## 2020-09-16 NOTE — PROGRESS NOTE ADULT - SUBJECTIVE AND OBJECTIVE BOX
24H hour events: Patient without complaints. Tele unremarkable    MEDICATIONS:  aspirin  chewable 81 milliGRAM(s) Oral daily  furosemide    Tablet 80 milliGRAM(s) Oral daily  metoprolol succinate ER 50 milliGRAM(s) Oral every 12 hours  sacubitril 49 mG/valsartan 51 mG 1 Tablet(s) Oral two times a day  tamsulosin 0.4 milliGRAM(s) Oral at bedtime  cefTRIAXone   IVPB 2000 milliGRAM(s) IV Intermittent every 24 hours  ALBUTerol    90 MICROgram(s) HFA Inhaler 2 Puff(s) Inhalation every 12 hours PRN  loratadine 10 milliGRAM(s) Oral daily  acetaminophen   Tablet .. 650 milliGRAM(s) Oral every 6 hours PRN  pantoprazole    Tablet 40 milliGRAM(s) Oral before breakfast  polyethylene glycol/electrolyte Solution 1000 milliLiter(s) Oral every 4 hours  atorvastatin 80 milliGRAM(s) Oral at bedtime  dextrose 40% Gel 15 Gram(s) Oral once PRN  dextrose 50% Injectable 12.5 Gram(s) IV Push once  dextrose 50% Injectable 25 Gram(s) IV Push once  dextrose 50% Injectable 25 Gram(s) IV Push once  glucagon  Injectable 1 milliGRAM(s) IntraMuscular once PRN  insulin glargine Injectable (LANTUS) 80 Unit(s) SubCutaneous at bedtime  insulin lispro (HumaLOG) corrective regimen sliding scale   SubCutaneous three times a day before meals  insulin lispro (HumaLOG) corrective regimen sliding scale   SubCutaneous at bedtime  insulin lispro Injectable (HumaLOG) 32 Unit(s) SubCutaneous three times a day with meals  chlorhexidine 4% Liquid 1 Application(s) Topical <User Schedule>  dextrose 5%. 1000 milliLiter(s) IV Continuous <Continuous>  influenza   Vaccine 0.5 milliLiter(s) IntraMuscular once      REVIEW OF SYSTEMS:  See HPI, otherwise ROS negative.    PHYSICAL EXAM:  T(C): 36.9 (09-16-20 @ 12:22), Max: 36.9 (09-16-20 @ 12:22)  HR: 84 (09-16-20 @ 12:22) (66 - 100)  BP: 102/69 (09-16-20 @ 12:22) (100/61 - 114/67)  RR: 18 (09-16-20 @ 12:22) (17 - 18)  SpO2: 97% (09-16-20 @ 12:22) (96% - 98%)  Wt(kg): --  I&O's Summary    15 Sep 2020 07:01  -  16 Sep 2020 07:00  --------------------------------------------------------  IN: 940 mL / OUT: 1600 mL / NET: -660 mL    16 Sep 2020 07:01  -  16 Sep 2020 15:01  --------------------------------------------------------  IN: 360 mL / OUT: 700 mL / NET: -340 mL        Appearance: Alert. NAD	  Cardiovascular: +S1S2 RRR no m/g/r  Respiratory: CTA B/L	  Psychiatry: A & O x 3, Mood & affect appropriate  Neurologic: Non-focal  Extremities: No edema BLE  Vascular: Peripheral pulses palpable 2+ bilaterally      LABS:	 	    CBC Full  -  ( 15 Sep 2020 09:12 )  WBC Count : 9.24 K/uL  Hemoglobin : 13.2 g/dL  Hematocrit : 42.0 %  Platelet Count - Automated : 244 K/uL  Mean Cell Volume : 81.2 fl  Mean Cell Hemoglobin : 25.5 pg  Mean Cell Hemoglobin Concentration : 31.4 gm/dL    09-15    138  |  99  |  31<H>  ----------------------------<  245<H>  3.7   |  24  |  1.52<H>    Ca    9.9      15 Sep 2020 09:12  Mg     2.2     09-15    TELEMETRY: SR 's bpm; occasional PVC's  	    	  ASSESSMENT/PLAN: 	  60y/o male h/o morbidly obesity h/o COPD (not on home O2), DM, HTN, MI, CAD s/p PCI (2/2018), ICM w/ HFrEF w/ 10-15% s/p primary prevention [employer] single chamber ICD, HTN, GERD, BPH, p/w sepsis secondary to gram positive bacteremia s/p ICD extraction 9/10  1. MI, CAD, ICM   2. HFrEF w/ EF 10-15%  3. Streptococcus Gallolyticus Bacteremia   4. s/p Single chamber ICD extraction 9/10    --For colonoscopy tomorrow  --Plan for S-ICD implant (screened in for S-ICD) on Friday 9/18  --Continue antibiotics per ID    Alvina Vogt PA-C  [id number]

## 2020-09-16 NOTE — PROGRESS NOTE ADULT - SUBJECTIVE AND OBJECTIVE BOX
Chief complaint  Patient is a 59y old  Male who presents with a chief complaint of fever, cough, emesis (15 Sep 2020 14:59)   Review of systems  Patient in chair, awake and alert, looks comfortable, no hypoglycemic episodes.    Labs and Fingersticks  CAPILLARY BLOOD GLUCOSE      POCT Blood Glucose.: 239 mg/dL (16 Sep 2020 12:21)  POCT Blood Glucose.: 200 mg/dL (16 Sep 2020 08:06)  POCT Blood Glucose.: 135 mg/dL (15 Sep 2020 21:39)  POCT Blood Glucose.: 158 mg/dL (15 Sep 2020 16:54)      Anion Gap, Serum: 15 (09-15 @ 09:12)      Calcium, Total Serum: 9.9 (09-15 @ 09:12)          09-15    138  |  99  |  31<H>  ----------------------------<  245<H>  3.7   |  24  |  1.52<H>    Ca    9.9      15 Sep 2020 09:12  Mg     2.2     09-15                          13.2   9.24  )-----------( 244      ( 15 Sep 2020 09:12 )             42.0     Medications  MEDICATIONS  (STANDING):  aspirin  chewable 81 milliGRAM(s) Oral daily  atorvastatin 80 milliGRAM(s) Oral at bedtime  cefTRIAXone   IVPB 2000 milliGRAM(s) IV Intermittent every 24 hours  chlorhexidine 4% Liquid 1 Application(s) Topical <User Schedule>  dextrose 5%. 1000 milliLiter(s) (50 mL/Hr) IV Continuous <Continuous>  dextrose 50% Injectable 12.5 Gram(s) IV Push once  dextrose 50% Injectable 25 Gram(s) IV Push once  dextrose 50% Injectable 25 Gram(s) IV Push once  furosemide    Tablet 80 milliGRAM(s) Oral daily  influenza   Vaccine 0.5 milliLiter(s) IntraMuscular once  insulin glargine Injectable (LANTUS) 80 Unit(s) SubCutaneous at bedtime  insulin lispro (HumaLOG) corrective regimen sliding scale   SubCutaneous three times a day before meals  insulin lispro (HumaLOG) corrective regimen sliding scale   SubCutaneous at bedtime  insulin lispro Injectable (HumaLOG) 32 Unit(s) SubCutaneous three times a day with meals  loratadine 10 milliGRAM(s) Oral daily  metoprolol succinate ER 50 milliGRAM(s) Oral every 12 hours  pantoprazole    Tablet 40 milliGRAM(s) Oral before breakfast  polyethylene glycol/electrolyte Solution 1000 milliLiter(s) Oral every 4 hours  sacubitril 49 mG/valsartan 51 mG 1 Tablet(s) Oral two times a day  tamsulosin 0.4 milliGRAM(s) Oral at bedtime      Physical Exam  General: Patient comfortable in bed  Vital Signs Last 12 Hrs  T(F): 98.4 (09-16-20 @ 12:22), Max: 98.4 (09-16-20 @ 12:22)  HR: 84 (09-16-20 @ 12:22) (66 - 84)  BP: 102/69 (09-16-20 @ 12:22) (100/65 - 114/67)  BP(mean): --  RR: 18 (09-16-20 @ 12:22) (18 - 18)  SpO2: 97% (09-16-20 @ 12:22) (96% - 97%)  Neck: No palpable thyroid nodules.  CVS: S1S2, No murmurs  Respiratory: No wheezing, no crepitations  GI: Abdomen soft, bowel sounds positive  Musculoskeletal:  edema lower extremities.   Skin: No skin rashes, no ecchymosis    Diagnostics    A1C with Estimated Average Glucose: Routine (09-09 @ 13:22)

## 2020-09-16 NOTE — PROGRESS NOTE ADULT - ASSESSMENT
Assessment  DMT2:  59y Male with DM T2 with hyperglycemia, A1C 11.2%, on basal bolus insulin, blood sugars fluctuating, FS improving yesterday and running high today, no hypoglycemic episodes, eating meals with good appetite, alert and comfortable.  Septic Shock: Hydronephrosis, On IV ABx, stable, monitored.  Obesity: No strict exercise routines, not on any weight loss program, neither on low calorie diet.          Cortney Flanagan MD  Cell: [phone]  Office: [phone]

## 2020-09-16 NOTE — PROGRESS NOTE ADULT - SUBJECTIVE AND OBJECTIVE BOX
Patient is a 59y old  Male who presents with a chief complaint of fever, cough, emesis (16 Sep 2020 13:38)    Being followed by ID for        Interval history:  pt for colonoscopy tomorrow- taking moviprep but still hasn't "gone"  getting concerned  feels well otherwise  No other acute events        PAST MEDICAL & SURGICAL HISTORY:  H/O gastroesophageal reflux (GERD)    History of COPD    History of ischemic cardiomyopathy    Heart failure with reduced ejection fraction    Hypertension    AICD (automatic cardioverter/defibrillator) present    Diabetes    Stented coronary artery    H/O vasectomy  20 yrs ago (2000)      Allergies    No Known Allergies    Intolerances      Antimicrobials:    cefTRIAXone   IVPB 2000 milliGRAM(s) IV Intermittent every 24 hours    MEDICATIONS  (STANDING):  aspirin  chewable 81 milliGRAM(s) Oral daily  atorvastatin 80 milliGRAM(s) Oral at bedtime  cefTRIAXone   IVPB 2000 milliGRAM(s) IV Intermittent every 24 hours  chlorhexidine 4% Liquid 1 Application(s) Topical <User Schedule>  dextrose 5%. 1000 milliLiter(s) (50 mL/Hr) IV Continuous <Continuous>  dextrose 50% Injectable 12.5 Gram(s) IV Push once  dextrose 50% Injectable 25 Gram(s) IV Push once  dextrose 50% Injectable 25 Gram(s) IV Push once  furosemide    Tablet 80 milliGRAM(s) Oral daily  influenza   Vaccine 0.5 milliLiter(s) IntraMuscular once  insulin glargine Injectable (LANTUS) 80 Unit(s) SubCutaneous at bedtime  insulin lispro (HumaLOG) corrective regimen sliding scale   SubCutaneous three times a day before meals  insulin lispro (HumaLOG) corrective regimen sliding scale   SubCutaneous at bedtime  insulin lispro Injectable (HumaLOG) 32 Unit(s) SubCutaneous three times a day with meals  loratadine 10 milliGRAM(s) Oral daily  metoprolol succinate ER 50 milliGRAM(s) Oral every 12 hours  pantoprazole    Tablet 40 milliGRAM(s) Oral before breakfast  polyethylene glycol/electrolyte Solution 1000 milliLiter(s) Oral every 4 hours  sacubitril 49 mG/valsartan 51 mG 1 Tablet(s) Oral two times a day  tamsulosin 0.4 milliGRAM(s) Oral at bedtime      Vital Signs Last 24 Hrs  T(C): 36.9 (09-16-20 @ 12:22), Max: 36.9 (09-16-20 @ 12:22)  T(F): 98.4 (09-16-20 @ 12:22), Max: 98.4 (09-16-20 @ 12:22)  HR: 84 (09-16-20 @ 12:22) (66 - 100)  BP: 102/69 (09-16-20 @ 12:22) (100/61 - 114/67)  BP(mean): --  RR: 18 (09-16-20 @ 12:22) (17 - 18)  SpO2: 97% (09-16-20 @ 12:22) (96% - 98%)    Physical Exam:    Constitutional well preserved,comfortable,pleasant    HEENT PERRLA EOMI,No pallor or icterus    No oral exudate or erythema    Neck supple no JVD or LN    Chest Good AE,CTA    CVS RRR S1 S2   AICD site- clean    Abd soft BS normal No tenderness no masses    Ext No cyanosis clubbing or edema    IV site no erythema tenderness or discharge    Joints no swelling or LOM    CNS AAO X 3 no focal    Lab Data:                          13.2   9.24  )-----------( 244      ( 15 Sep 2020 09:12 )             42.0       09-15    138  |  99  |  31<H>  ----------------------------<  245<H>  3.7   |  24  |  1.52<H>    Ca    9.9      15 Sep 2020 09:12  Mg     2.2     09-15            .Blood Blood-Peripheral  09-14-20   No growth to date.  --  --      .Surgical Swab icd lead  09-10-20   No growth at 5 days  --  --      .Urine Bladder (from O.R.)  09-10-20   No growth  --  --      .Blood Blood-Peripheral  09-10-20   No Growth Final  --  --      .Blood Blood  09-09-20   No Growth Final  --  --                    WBC Count: 9.24 (09-15-20 @ 09:12)  WBC Count: 8.54 (09-14-20 @ 07:36)  WBC Count: 6.15 (09-13-20 @ 07:12)  WBC Count: 7.07 (09-12-20 @ 06:04)  WBC Count: 8.42 (09-11-20 @ 00:27)  WBC Count: 8.59 (09-10-20 @ 00:24)

## 2020-09-16 NOTE — CHART NOTE - NSCHARTNOTEFT_GEN_A_CORE
Nutrition Follow-up  Patient seen for: Consult received for assessment and education    Source: comprehensive chart review, ( )     Hospital course as per chart: Pt is a 60 yo male with PMH of T2DM, HTN, CAD/MI s/p stents (most recent 2/2018), HFrEF, ICD, ischemic cardiomyopathy, COPD, HTN, GERD, who presented with fever, N/V, and back pain x 1 day. Admitted 9/9 for sepsis secondary to gram positive bacteremia. Initially admitted to MICU, now transferred to floor. Chart reviewed, events noted. Plan for colonoscopy tomorrow (9/17).     Diet: Clear Liquid (previous diet order consistent carbohydrate (no snacks))    Patient reports ( )    Encouraged PO intake as tolerated ( ).     Education provided:     PO intake:  < 50% [ ] 50-75% [ ]   % [ ]  other :    Source for PO intake:     Current Weight: 277.3 pounds (9/16 standing) - noted with weight loss since admission, likely related to fluid losses in setting of diuresis. Will continue to monitor and trend weights.     Pertinent Medications: MEDICATIONS  (STANDING):  aspirin  chewable 81 milliGRAM(s) Oral daily  atorvastatin 80 milliGRAM(s) Oral at bedtime  chlorhexidine 4% Liquid 1 Application(s) Topical <User Schedule>  dextrose 5%. 1000 milliLiter(s) (50 mL/Hr) IV Continuous <Continuous>  dextrose 50% Injectable 12.5 Gram(s) IV Push once  dextrose 50% Injectable 25 Gram(s) IV Push once  dextrose 50% Injectable 25 Gram(s) IV Push once  furosemide    Tablet 80 milliGRAM(s) Oral daily  influenza   Vaccine 0.5 milliLiter(s) IntraMuscular once  insulin glargine Injectable (LANTUS) 75 Unit(s) SubCutaneous at bedtime  insulin lispro (HumaLOG) corrective regimen sliding scale   SubCutaneous three times a day before meals  insulin lispro (HumaLOG) corrective regimen sliding scale   SubCutaneous at bedtime  insulin lispro Injectable (HumaLOG) 30 Unit(s) SubCutaneous three times a day with meals  loratadine 10 milliGRAM(s) Oral daily  metoprolol succinate ER 50 milliGRAM(s) Oral every 12 hours  pantoprazole    Tablet 40 milliGRAM(s) Oral before breakfast  polyethylene glycol/electrolyte Solution 1000 milliLiter(s) Oral every 4 hours  sacubitril 49 mG/valsartan 51 mG 1 Tablet(s) Oral two times a day  tamsulosin 0.4 milliGRAM(s) Oral at bedtime    MEDICATIONS  (PRN):  acetaminophen   Tablet .. 650 milliGRAM(s) Oral every 6 hours PRN Temp greater or equal to 38C (100.4F), Mild Pain (1 - 3)  ALBUTerol    90 MICROgram(s) HFA Inhaler 2 Puff(s) Inhalation every 12 hours PRN Shortness of Breath and/or Wheezing  dextrose 40% Gel 15 Gram(s) Oral once PRN Blood Glucose LESS THAN 70 milliGRAM(s)/deciliter  glucagon  Injectable 1 milliGRAM(s) IntraMuscular once PRN Glucose LESS THAN 70 milligrams/deciliter    Pertinent Labs:  09-15 Na138 mmol/L Glu 245 mg/dL<H> K+ 3.7 mmol/L Cr  1.52 mg/dL<H> BUN 31 mg/dL<H> 09-12 Phos 2.8 mg/dL 09-11 Alb 3.4 g/dL    CAPILLARY BLOOD GLUCOSE  POCT Blood Glucose.: 200 mg/dL (16 Sep 2020 08:06)  POCT Blood Glucose.: 135 mg/dL (15 Sep 2020 21:39)  POCT Blood Glucose.: 158 mg/dL (15 Sep 2020 16:54)  POCT Blood Glucose.: 242 mg/dL (15 Sep 2020 12:13)    Skin: no pressure injuries per flowsheets   Edema: no edema per flowsheets     Estimated Needs: recalculated  Based on  pounds (94.5 kg)   Estimated energy needs (25-30 kcal/kg): 1226-1734 kcal/day  Estimated protein needs (1.2-1.4 g/kg): 113-132 g/day  Defer fluids to team     Previous Nutrition Diagnosis: Food & Nutrition Related Knowledge Deficit - diagnosis ongoing, being addressed with diet education     New Nutrition Diagnosis: [ ] not applicable    [ ] Inadequate Protein Energy Intake [ ]Inadequate Oral Intake [ ] Excessive Energy Intake     [ ] Underweight [ ] Increased Nutrient Needs [ ] Overweight/Obesity     [ ] Altered GI Function [ ] Unintended Weight Loss [ ] Food & Nutrition Related Knowledge Deficit[ ] Limited Adherence to nutrition related recommendations [ ] Malnutrition  [ ] other: Free text    Recommendations:  1)     Monitoring and Evaluation: Monitor PO intake, weight, labs, skin, GI status, diet     RD remains available upon request and will continue to follow-up per protocol.   Meghann Nichols MS RD CDN pager #[phone] Nutrition Follow-up  Patient seen for: Consult received for assessment and education    Source: comprehensive chart review, patient    Hospital course as per chart: Pt is a 58 yo male with PMH of T2DM, HTN, CAD/MI s/p stents (most recent 2/2018), HFrEF, ICD, ischemic cardiomyopathy, COPD, HTN, GERD, who presented with fever, N/V, and back pain x 1 day. Admitted 9/9 for sepsis secondary to gram positive bacteremia. Initially admitted to MICU, now transferred to floor. Chart reviewed, events noted. Plan for colonoscopy tomorrow (9/17).     Diet: Clear Liquid (previous diet order consistent carbohydrate (no snacks))    Patient reports good appetite and tolerance to Clear Liquid diet. Previously eating well - consuming 100% of meals when on consistent carbohydrate diet. Denies nausea/vomiting/diarrhea/constipation. Last BM yesterday (9/15) per flowsheets.     Offered additional diet education to pt, pt declined. Of note, previously seen by RD, education provided on 9/11. Pt made aware RD remains available.     PO intake:  < 50% [ ] 50-75% [ ]   % [x]     Source for PO intake: patient, flowsheets    Current Weight: 277.3 pounds (9/16 standing) - noted with weight loss since admission, likely related to fluid losses in setting of diuresis. Will continue to monitor and trend weights.     Pertinent Medications: MEDICATIONS  (STANDING):  aspirin  chewable 81 milliGRAM(s) Oral daily  atorvastatin 80 milliGRAM(s) Oral at bedtime  chlorhexidine 4% Liquid 1 Application(s) Topical <User Schedule>  dextrose 5%. 1000 milliLiter(s) (50 mL/Hr) IV Continuous <Continuous>  dextrose 50% Injectable 12.5 Gram(s) IV Push once  dextrose 50% Injectable 25 Gram(s) IV Push once  dextrose 50% Injectable 25 Gram(s) IV Push once  furosemide    Tablet 80 milliGRAM(s) Oral daily  influenza   Vaccine 0.5 milliLiter(s) IntraMuscular once  insulin glargine Injectable (LANTUS) 75 Unit(s) SubCutaneous at bedtime  insulin lispro (HumaLOG) corrective regimen sliding scale   SubCutaneous three times a day before meals  insulin lispro (HumaLOG) corrective regimen sliding scale   SubCutaneous at bedtime  insulin lispro Injectable (HumaLOG) 30 Unit(s) SubCutaneous three times a day with meals  loratadine 10 milliGRAM(s) Oral daily  metoprolol succinate ER 50 milliGRAM(s) Oral every 12 hours  pantoprazole    Tablet 40 milliGRAM(s) Oral before breakfast  polyethylene glycol/electrolyte Solution 1000 milliLiter(s) Oral every 4 hours  sacubitril 49 mG/valsartan 51 mG 1 Tablet(s) Oral two times a day  tamsulosin 0.4 milliGRAM(s) Oral at bedtime    MEDICATIONS  (PRN):  acetaminophen   Tablet .. 650 milliGRAM(s) Oral every 6 hours PRN Temp greater or equal to 38C (100.4F), Mild Pain (1 - 3)  ALBUTerol    90 MICROgram(s) HFA Inhaler 2 Puff(s) Inhalation every 12 hours PRN Shortness of Breath and/or Wheezing  dextrose 40% Gel 15 Gram(s) Oral once PRN Blood Glucose LESS THAN 70 milliGRAM(s)/deciliter  glucagon  Injectable 1 milliGRAM(s) IntraMuscular once PRN Glucose LESS THAN 70 milligrams/deciliter    Pertinent Labs:  09-15 Na138 mmol/L Glu 245 mg/dL<H> K+ 3.7 mmol/L Cr  1.52 mg/dL<H> BUN 31 mg/dL<H> 09-12 Phos 2.8 mg/dL 09-11 Alb 3.4 g/dL    CAPILLARY BLOOD GLUCOSE  POCT Blood Glucose.: 200 mg/dL (16 Sep 2020 08:06)  POCT Blood Glucose.: 135 mg/dL (15 Sep 2020 21:39)  POCT Blood Glucose.: 158 mg/dL (15 Sep 2020 16:54)  POCT Blood Glucose.: 242 mg/dL (15 Sep 2020 12:13)    Skin: no pressure injuries per flowsheets   Edema: no edema per flowsheets     Estimated Needs: recalculated  Based on  pounds (94.5 kg)   Estimated energy needs (25-30 kcal/kg): 0915-3771 kcal/day  Estimated protein needs (1.2-1.4 g/kg): 113-132 g/day  Defer fluids to team     Previous Nutrition Diagnosis: Food & Nutrition Related Knowledge Deficit - diagnosis ongoing, being addressed with diet education     New Nutrition Diagnosis: not applicable    Recommendations:  1) Continue Clear Liquid diet at this time, suggest Consistent Carbohydrate, Clear Liquid diet in setting of DM  - As medically feasible, recommend advancing to DASH/TLC, consistent carbohydrate diet. Monitor/adjust as needed.  2) Pt declined further diet education at this time. Made aware RD remains available.     Monitoring and Evaluation: Monitor PO intake, weight, labs, skin, GI status, diet     RD remains available upon request and will continue to follow-up per protocol.   Meghann Nichols MS, RD CDN pager #[phone]

## 2020-09-17 ENCOUNTER — RESULT REVIEW (OUTPATIENT)
Age: 60
End: 2020-09-17

## 2020-09-17 LAB
ANION GAP SERPL CALC-SCNC: 13 MMOL/L — SIGNIFICANT CHANGE UP (ref 5–17)
BLD GP AB SCN SERPL QL: NEGATIVE — SIGNIFICANT CHANGE UP
BUN SERPL-MCNC: 28 MG/DL — HIGH (ref 7–23)
CALCIUM SERPL-MCNC: 9.5 MG/DL — SIGNIFICANT CHANGE UP (ref 8.4–10.5)
CHLORIDE SERPL-SCNC: 101 MMOL/L — SIGNIFICANT CHANGE UP (ref 96–108)
CO2 SERPL-SCNC: 26 MMOL/L — SIGNIFICANT CHANGE UP (ref 22–31)
CREAT SERPL-MCNC: 1.49 MG/DL — HIGH (ref 0.5–1.3)
GLUCOSE BLDC GLUCOMTR-MCNC: 108 MG/DL — HIGH (ref 70–99)
GLUCOSE BLDC GLUCOMTR-MCNC: 149 MG/DL — HIGH (ref 70–99)
GLUCOSE BLDC GLUCOMTR-MCNC: 281 MG/DL — HIGH (ref 70–99)
GLUCOSE SERPL-MCNC: 55 MG/DL — LOW (ref 70–99)
HCT VFR BLD CALC: 37.8 % — LOW (ref 39–50)
HGB BLD-MCNC: 11.9 G/DL — LOW (ref 13–17)
MCHC RBC-ENTMCNC: 25.6 PG — LOW (ref 27–34)
MCHC RBC-ENTMCNC: 31.5 GM/DL — LOW (ref 32–36)
MCV RBC AUTO: 81.5 FL — SIGNIFICANT CHANGE UP (ref 80–100)
NRBC # BLD: 0 /100 WBCS — SIGNIFICANT CHANGE UP (ref 0–0)
PLATELET # BLD AUTO: 241 K/UL — SIGNIFICANT CHANGE UP (ref 150–400)
POTASSIUM SERPL-MCNC: 3.2 MMOL/L — LOW (ref 3.5–5.3)
POTASSIUM SERPL-SCNC: 3.2 MMOL/L — LOW (ref 3.5–5.3)
RBC # BLD: 4.64 M/UL — SIGNIFICANT CHANGE UP (ref 4.2–5.8)
RBC # FLD: 15.6 % — HIGH (ref 10.3–14.5)
RH IG SCN BLD-IMP: POSITIVE — SIGNIFICANT CHANGE UP
SODIUM SERPL-SCNC: 140 MMOL/L — SIGNIFICANT CHANGE UP (ref 135–145)
WBC # BLD: 8.62 K/UL — SIGNIFICANT CHANGE UP (ref 3.8–10.5)
WBC # FLD AUTO: 8.62 K/UL — SIGNIFICANT CHANGE UP (ref 3.8–10.5)

## 2020-09-17 PROCEDURE — 99233 SBSQ HOSP IP/OBS HIGH 50: CPT

## 2020-09-17 PROCEDURE — 45385 COLONOSCOPY W/LESION REMOVAL: CPT | Mod: GC

## 2020-09-17 PROCEDURE — 88305 TISSUE EXAM BY PATHOLOGIST: CPT | Mod: 26

## 2020-09-17 PROCEDURE — 99232 SBSQ HOSP IP/OBS MODERATE 35: CPT

## 2020-09-17 PROCEDURE — 99231 SBSQ HOSP IP/OBS SF/LOW 25: CPT

## 2020-09-17 RX ORDER — POTASSIUM CHLORIDE 20 MEQ
40 PACKET (EA) ORAL ONCE
Refills: 0 | Status: COMPLETED | OUTPATIENT
Start: 2020-09-17 | End: 2020-09-17

## 2020-09-17 RX ORDER — INSULIN LISPRO 100/ML
20 VIAL (ML) SUBCUTANEOUS
Refills: 0 | Status: DISCONTINUED | OUTPATIENT
Start: 2020-09-17 | End: 2020-09-21

## 2020-09-17 RX ORDER — CHLORHEXIDINE GLUCONATE 213 G/1000ML
1 SOLUTION TOPICAL
Refills: 0 | Status: COMPLETED | OUTPATIENT
Start: 2020-09-17 | End: 2020-09-18

## 2020-09-17 RX ORDER — ACETAMINOPHEN 500 MG
650 TABLET ORAL ONCE
Refills: 0 | Status: COMPLETED | OUTPATIENT
Start: 2020-09-17 | End: 2020-09-17

## 2020-09-17 RX ORDER — INSULIN GLARGINE 100 [IU]/ML
30 INJECTION, SOLUTION SUBCUTANEOUS ONCE
Refills: 0 | Status: COMPLETED | OUTPATIENT
Start: 2020-09-17 | End: 2020-09-17

## 2020-09-17 RX ORDER — INSULIN GLARGINE 100 [IU]/ML
40 INJECTION, SOLUTION SUBCUTANEOUS ONCE
Refills: 0 | Status: COMPLETED | OUTPATIENT
Start: 2020-09-17 | End: 2020-09-17

## 2020-09-17 RX ORDER — INSULIN GLARGINE 100 [IU]/ML
60 INJECTION, SOLUTION SUBCUTANEOUS AT BEDTIME
Refills: 0 | Status: DISCONTINUED | OUTPATIENT
Start: 2020-09-17 | End: 2020-09-21

## 2020-09-17 RX ADMIN — Medication 650 MILLIGRAM(S): at 22:45

## 2020-09-17 RX ADMIN — INSULIN GLARGINE 30 UNIT(S): 100 INJECTION, SOLUTION SUBCUTANEOUS at 00:20

## 2020-09-17 RX ADMIN — CHLORHEXIDINE GLUCONATE 1 APPLICATION(S): 213 SOLUTION TOPICAL at 23:02

## 2020-09-17 RX ADMIN — Medication 40 MILLIEQUIVALENT(S): at 22:16

## 2020-09-17 RX ADMIN — Medication 80 MILLIGRAM(S): at 05:48

## 2020-09-17 RX ADMIN — Medication 81 MILLIGRAM(S): at 16:28

## 2020-09-17 RX ADMIN — LORATADINE 10 MILLIGRAM(S): 10 TABLET ORAL at 16:29

## 2020-09-17 RX ADMIN — INSULIN GLARGINE 40 UNIT(S): 100 INJECTION, SOLUTION SUBCUTANEOUS at 22:59

## 2020-09-17 RX ADMIN — Medication 50 MILLIGRAM(S): at 18:27

## 2020-09-17 RX ADMIN — Medication 20 UNIT(S): at 17:44

## 2020-09-17 RX ADMIN — SACUBITRIL AND VALSARTAN 1 TABLET(S): 24; 26 TABLET, FILM COATED ORAL at 18:26

## 2020-09-17 RX ADMIN — Medication 1: at 23:02

## 2020-09-17 RX ADMIN — ATORVASTATIN CALCIUM 80 MILLIGRAM(S): 80 TABLET, FILM COATED ORAL at 22:16

## 2020-09-17 RX ADMIN — TAMSULOSIN HYDROCHLORIDE 0.4 MILLIGRAM(S): 0.4 CAPSULE ORAL at 22:16

## 2020-09-17 RX ADMIN — Medication 650 MILLIGRAM(S): at 22:16

## 2020-09-17 RX ADMIN — CEFTRIAXONE 100 MILLIGRAM(S): 500 INJECTION, POWDER, FOR SOLUTION INTRAMUSCULAR; INTRAVENOUS at 22:17

## 2020-09-17 RX ADMIN — CHLORHEXIDINE GLUCONATE 1 APPLICATION(S): 213 SOLUTION TOPICAL at 06:37

## 2020-09-17 NOTE — PROGRESS NOTE ADULT - ASSESSMENT
ASSESSMENT/RECOMMENDATIONS:  59M PMH T2DM(a1c=11.2%), HTN, CAD/MI s/p stents (most recent 2/2018), HFrEF (10-15%), ICD, ischemic cardiomyopathy, COPD, HTN, GERD pw fever, n/v, back pain x 1 day. Pt states that he was in his baseline state of health when he began to experience back pain while grocery shopping yesterday. He describes sudden onset left lower back pain, palliated by sitting upright, of sharp quality, without radiation, of 7/10 severity, which completely resolved when presenting to ED.     VS: febrile 102.9, HR , BP 63/40 to 105/62, RR 17-25, SpO2%  on RA  Labs: significant for WBC 10.28, thrombocytopenia 146, elevated BUN/SCr 32/1.88, hyperglycemic 247, elevated proBNP 1158, lactate wnl  Micro: COVID-19 negative, RVP negative, UA 0 WBC and negative for bacteria. GI PCR negative. BCx: Strep by PCR.  CT: No pneumonia. Emphysema. No bowel or obstruction. Hepatomegaly and diffuse hepatic steatosis. Splenomegaly. Atrophic right kidney, with severe hydronephrosis. A 0.7 cm soft tissue density is noted in the interpolar region of the right kidney and is incompletely characterized. Ultrasound may be helpful for further evaluation.  Pt admitted to MICU for management of hypotension requiring pressor support. While in ICU experienced emesis and diarrhea (without melena/hematochezia) x 1 with SOB.     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    Overall bacteremia, strep infection.       Plan:   continue Rocephin at current dose.   s/p colonoscopy  MRI with possible early Osteomyelitis, no extension into epidural space, reviewed personally with neuroradiology, therefore will likely need a 6 week course of INTRAVENOUS  abs  follow up blood cultures so far NGTD  ICD lead cx NTD   Cr  remains elevated   follow ESR and CRP  for S-ICD tomorrow  please  have patient follow up with me in to weeks  WHEN goes home   Ceftriaxone 2 gms IVSS daily weekly cbc, cmp, ESR, CRP

## 2020-09-17 NOTE — PRE PROCEDURE NOTE - PRE PROCEDURE EVALUATION
Attending Physician:  dr. seth                         Procedure: colonoscopy    Indication for Procedure: bacteremia, r/o colon cancer  ________________________________________________________  PAST MEDICAL & SURGICAL HISTORY:  H/O gastroesophageal reflux (GERD)    History of COPD    History of ischemic cardiomyopathy    Heart failure with reduced ejection fraction    Hypertension    AICD (automatic cardioverter/defibrillator) present    Diabetes    Stented coronary artery    H/O vasectomy  20 yrs ago (2000)      ALLERGIES:  No Known Allergies    HOME MEDICATIONS:  albuterol 90 mcg/inh inhalation aerosol: 2 puff(s) inhaled 2 times a day  Allegra 180 mg oral tablet: 1 tab(s) orally once a day  Entresto 49 mg-51 mg oral tablet: 1 tab(s) orally 2 times a day  furosemide 80 mg oral tablet: 1 tab(s) orally once a day  HumaLOG 100 units/mL subcutaneous solution: 50 unit(s) subcutaneous  indapamide 1.25 mg oral tablet: 1 tab(s) orally once a day  Lantus 100 units/mL subcutaneous solution: 74 unit(s) subcutaneous once a day (at bedtime)  Metoprolol Succinate  mg oral tablet, extended release: 1 tab(s) orally once a day  Opcon-A 0.027%-0.315% ophthalmic solution: to each affected eye prn  pantoprazole 40 mg oral delayed release tablet: 1 tab(s) orally once a day  rosuvastatin 20 mg oral tablet: 1 tab(s) orally once a day  tamsulosin 0.4 mg oral capsule: 1 cap(s) orally once a day    AICD/PPM: [x] yes   [] no    PERTINENT LAB DATA:                        11.9   8.62  )-----------( 241      ( 17 Sep 2020 06:24 )             37.8     09-17    140  |  101  |  28<H>  ----------------------------<  55<L>  3.2<L>   |  26  |  1.49<H>    Ca    9.5      17 Sep 2020 06:23      PT/INR - ( 17 Sep 2020 07:58 )   PT: 13.3 sec;   INR: 1.12 ratio      PTT - ( 17 Sep 2020 07:58 )  PTT:31.8 sec          PHYSICAL EXAMINATION:    T(C): 36.6  HR: 81  BP: 100/55  RR: 19  SpO2: 98%    Constitutional: NAD    Neck:  No JVD  Respiratory: CTAB/L  Cardiovascular: S1 and S2  Gastrointestinal: BS+, soft, NT/ND  Extremities: No peripheral edema  Neurological: A/O x 3        COMMENTS:    The patient is a suitable candidate for the planned procedure unless box checked [ ]  No, explain:

## 2020-09-17 NOTE — PROGRESS NOTE ADULT - ASSESSMENT
58y/o male h/o morbidly obesity h/o COPD (not on home O2), DM, HTN, MI, CAD s/p PCI (2/2018), ICM w/ HFrEF w/ 10-15% s/p primary prevention [employer] single chamber ICD, HTN, GERD, BPH, p/w sepsis secondary to gram positive bacteremia s/p ICD extraction 9/10    1. MI, CAD, ICM   2. HFrEF w/ EF 10-15%  3. Streptococcus Gallolyticus Bacteremia   4. s/p Single chamber ICD extraction 9/10    --For colonoscopy today  --Plan for S-ICD implant (screened in for S-ICD) on Friday 9/18, please make NPO at midnight tonight  --Will need current type and screen  --Continue antibiotics per ID    [id number]   60y/o male h/o morbidly obesity h/o COPD (not on home O2), DM, HTN, MI, CAD s/p PCI (2/2018), ICM w/ HFrEF w/ 10-15% s/p primary prevention [employer] single chamber ICD, HTN, GERD, BPH, p/w sepsis secondary to gram positive bacteremia s/p ICD extraction 9/10    1. MI, CAD, ICM   2. HFrEF w/ EF 10-15%  3. Streptococcus Gallolyticus Bacteremia   4. s/p Single chamber ICD extraction 9/10    --For colonoscopy today  --Plan for S-ICD implant (screened in for S-ICD) on Friday 9/18, please make NPO at midnight tonight  --Hibiclens anterior chest wall and left sub axillary area at 10pm tonight and 6am tomorrow morning  --Will need current type and screen  --Continue antibiotics per ID    [id number]

## 2020-09-17 NOTE — PRE-ANESTHESIA EVALUATION ADULT - NSRADCARDRESULTSFT_GEN_ALL_CORE
9/9/20:  TTE Observations:  Mitral Valve: Tethered mitral valve leaflets with normal opening-not well visualized. Minimal mitral regurgitation.  Aortic Valve/Aorta: Aortic valve not well visualized appears calcified. Peak left ventricular outflow tract gradient equals 1 mm Hg, LVOT velocity time integral equals  8 cm. Not well visualized.  Left Atrium: Left atrium not well visualized, probably normal.  Left Ventricle: Endocardial visualization enhanced with intravenous injection of Ultrasonic Enhancing Agent (Definity).  Severe global left ventricular systolic dysfunction. No obvious LV thrombus. Estimated EF of 15-20%.  No left ventricular thrombus. Left ventricular enlargement. Severe diastolic dysfunction (Stage III).  Right Heart: A device wire is noted in the right heart. The right ventricle is not well visualized. Tricuspid valve not well visualized. No tricuspid regurgitation. Pulmonic valve not well visualized.  Pericardium/Pleura: Normal pericardium with no pericardial effusion.  Hemodynamic: Estimated right atrial pressure is 8 mm Hg.  ------------------------------------------------------------------------  Conclusions:  1. Aortic valve not well visualized; appears calcified.  2. Left ventricular enlargement.  3. Endocardial visualization enhanced with intravenous injection of Ultrasonic Enhancing Agent (Definity).  Severe global left ventricular systolic dysfunction.No obvious LV thrombus. Estimated EF of 15-20%. No left ventricular thrombus.  4. Severe diastolic dysfunction (Stage III).  5. A device wire is noted in the right heart.  6. Tricuspid valve not well visualized. No tricuspid regurgitation.  7. Pulmonic valve not well visualized. Unable to rule out endocarditis. Consider DONNIE if clinically indicated.
Patient asked questions/Verbalized Understanding/Simple: Patient demonstrates quick and easy understanding

## 2020-09-17 NOTE — CHART NOTE - NSCHARTNOTEFT_GEN_A_CORE
Episodic Note    Notified by the RN of the following point of care glucose values:     POCT Blood Glucose (09.16.20 @ 21:53)   POCT Blood Glucose.: 76 mg/dL     POCT Blood Glucose (09.16.20 @ 23:46)   POCT Blood Glucose.: 109 mg/dL     ASSESSMENT/PLAN:  HPI: 59M PMH T2DM, HTN, CAD/MI s/p stents (most recent 2/2018), ICM w/ HFrEF (10-15%) s/p AICD, COPD (not on home O2), HTN, GERD, BPH, who was admitted for sepsis 2/2 gram positive bacteremia. Now f/u on point of care glucose values    IMPRESSION: F/u on point of care glucose values  > Spoke to Endocrinologist, Dr. Flanagan about the above findings. Since patient when had a point of care glucose fingerstick of 76 mg/dL when not NPO yet, dietary measures were indicated, which raised the point of care glucose fingerstick to 109 mg/dL.  > Per Dr. Flanagan, at that time, give 30 units of the patient's Lantus, instead of the full standing 80 units of Lantus at bedtime, due to the patients NPO status.   > Per Dr. Flanagan, okay for patient to get standing insulin scale as he qualifies for it.   > Continue to monitor point of care fingersticks.   > Continue to monitor the patient and vitals closely.   > F/u with primary care team and endocrinology in AM.     RN made aware of above plan. Above discussed with Endocrinologist attending, Dr. Flanagan.     Radha Hammonds PA-C  Department of Medicine   #[id number]

## 2020-09-17 NOTE — PROGRESS NOTE ADULT - SUBJECTIVE AND OBJECTIVE BOX
Patient is a 59y old  Male who presents with a chief complaint of fever, cough, emesis (17 Sep 2020 14:13)    Being followed by ID for        Interval history:  pt s/p colonoscopy , six polyps noted and removed, external hemorrhoids   for S-ICD tomorrow  for PICC line  No other acute events          PAST MEDICAL & SURGICAL HISTORY:  H/O gastroesophageal reflux (GERD)    History of COPD    History of ischemic cardiomyopathy    Heart failure with reduced ejection fraction    Hypertension    AICD (automatic cardioverter/defibrillator) present    Diabetes    Stented coronary artery    H/O vasectomy  20 yrs ago (2000)      Allergies    No Known Allergies    Intolerances      Antimicrobials:    cefTRIAXone   IVPB 2000 milliGRAM(s) IV Intermittent every 24 hours    MEDICATIONS  (STANDING):  aspirin  chewable 81 milliGRAM(s) Oral daily  atorvastatin 80 milliGRAM(s) Oral at bedtime  cefTRIAXone   IVPB 2000 milliGRAM(s) IV Intermittent every 24 hours  chlorhexidine 4% Liquid 1 Application(s) Topical <User Schedule>  chlorhexidine 4% Liquid 1 Application(s) Topical <User Schedule>  dextrose 5%. 1000 milliLiter(s) (50 mL/Hr) IV Continuous <Continuous>  dextrose 50% Injectable 12.5 Gram(s) IV Push once  dextrose 50% Injectable 25 Gram(s) IV Push once  dextrose 50% Injectable 25 Gram(s) IV Push once  furosemide    Tablet 80 milliGRAM(s) Oral daily  influenza   Vaccine 0.5 milliLiter(s) IntraMuscular once  insulin glargine Injectable (LANTUS) 60 Unit(s) SubCutaneous at bedtime  insulin lispro (HumaLOG) corrective regimen sliding scale   SubCutaneous three times a day before meals  insulin lispro (HumaLOG) corrective regimen sliding scale   SubCutaneous at bedtime  insulin lispro Injectable (HumaLOG) 20 Unit(s) SubCutaneous three times a day with meals  loratadine 10 milliGRAM(s) Oral daily  metoprolol succinate ER 50 milliGRAM(s) Oral every 12 hours  pantoprazole    Tablet 40 milliGRAM(s) Oral before breakfast  potassium chloride    Tablet ER 40 milliEquivalent(s) Oral once  sacubitril 49 mG/valsartan 51 mG 1 Tablet(s) Oral two times a day  sodium chloride 0.9%. 500 milliLiter(s) (30 mL/Hr) IV Continuous <Continuous>  tamsulosin 0.4 milliGRAM(s) Oral at bedtime      Vital Signs Last 24 Hrs  T(C): 36.9 (09-17-20 @ 18:23), Max: 36.9 (09-17-20 @ 18:23)  T(F): 98.4 (09-17-20 @ 18:23), Max: 98.4 (09-17-20 @ 18:23)  HR: 99 (09-17-20 @ 18:23) (81 - 99)  BP: 104/66 (09-17-20 @ 18:23) (97/66 - 119/74)  BP(mean): --  RR: 18 (09-17-20 @ 18:23) (12 - 19)  SpO2: 98% (09-17-20 @ 18:23) (95% - 98%)    Physical Exam:    Constitutional well preserved,comfortable,pleasant    HEENT PERRLA EOMI,No pallor or icterus    No oral exudate or erythema    Neck supple no JVD or LN    Chest Good AE,CTA    CVS  S1 S2    Abd soft BS normal No tenderness     Ext No cyanosis clubbing or edema    IV site no erythema tenderness or discharge    Joints no swelling or LOM    CNS AAO X 3 no focal  dry , scaley skin left arm  Lab Data:                          11.9   8.62  )-----------( 241      ( 17 Sep 2020 06:24 )             37.8       09-17    140  |  101  |  28<H>  ----------------------------<  55<L>  3.2<L>   |  26  |  1.49<H>    Ca    9.5      17 Sep 2020 06:23        .Blood Blood-Peripheral  09-14-20   No growth to date.  --  --        WBC Count: 8.62 (09-17-20 @ 06:24)  WBC Count: 9.24 (09-15-20 @ 09:12)  WBC Count: 8.54 (09-14-20 @ 07:36)  WBC Count: 6.15 (09-13-20 @ 07:12)  WBC Count: 7.07 (09-12-20 @ 06:04)  WBC Count: 8.42 (09-11-20 @ 00:27)        Culture - Blood (09.09.20 @ 00:57)    -  Streptococcus sp. (Not Grp A, B or S pneumoniae): Detec    Gram Stain:   Growth in anaerobic bottle: Gram Positive Cocci in Pairs and Chains  Growth in aerobic bottle: Gram Positive Cocci in Pairs and Chains    -  Ceftriaxone: S 0.125    -  Clindamycin: S    -  Erythromycin: S    -  Levofloxacin: S    -  Penicillin: S 0.094    -  Vancomycin: S    Specimen Source: .Blood Blood-Peripheral    Organism: Blood Culture PCR    Organism: Streptococcus gallolyticus    Organism: Streptococcus gallolyticus    Culture Results:   Growth in aerobic and anaerobic bottles: Streptococcus gallolyticus  "Due to technical problems, Proteus sp. will Not be reported as part of  the BCID panel until further notice"  ***Blood Panel PCR results on this specimen are available  approximately 3 hours after the Gram stain result.***  Gram stain, PCR, and/or culture results may not always  correspond due to difference in methodologies.  ************************************************************  This PCR assay was performed using [employer].  The following targets are tested for: Enterococcus,  vancomycin resistant enterococci, Listeria monocytogenes,  coagulase negative staphylococci, S. aureus,  methicillin resistant S. aureus, Streptococcus agalactiae  (Group B), S. pneumoniae, S. pyogenes (Group A),  Acinetobacter baumannii, Enterobacter cloacae, E. coli,  Klebsiella oxytoca, K. pneumoniae, Proteus sp.,  Serratia marcescens, Haemophilus influenzae,  Neisseria meningitidis, Pseudomonas aeruginosa, Candida  albicans, C. glabrata, C krusei, C parapsilosis,  C. tropicalis and the KPC resistance gene.    Organism Identification: Blood Culture PCR  Streptococcus gallolyticus    Method Type: ETEST    Method Type: PCR    Method Type: KB

## 2020-09-17 NOTE — PROGRESS NOTE ADULT - SUBJECTIVE AND OBJECTIVE BOX
HPI:  Patient seen and examined at bedside on 4 Suresh.  Plan for colonoscopy today (prepped overnight).    Review Of Systems:           Respiratory: No shortness of breath, cough, or wheezing  Cardiovascular: No chest pain or palpitations  10 point review of systems is otherwise negative except as mentioned above        Medications:  acetaminophen   Tablet .. 650 milliGRAM(s) Oral every 6 hours PRN  ALBUTerol    90 MICROgram(s) HFA Inhaler 2 Puff(s) Inhalation every 12 hours PRN  aspirin  chewable 81 milliGRAM(s) Oral daily  atorvastatin 80 milliGRAM(s) Oral at bedtime  cefTRIAXone   IVPB 2000 milliGRAM(s) IV Intermittent every 24 hours  chlorhexidine 4% Liquid 1 Application(s) Topical <User Schedule>  chlorhexidine 4% Liquid 1 Application(s) Topical <User Schedule>  dextrose 40% Gel 15 Gram(s) Oral once PRN  dextrose 5%. 1000 milliLiter(s) IV Continuous <Continuous>  dextrose 50% Injectable 12.5 Gram(s) IV Push once  dextrose 50% Injectable 25 Gram(s) IV Push once  dextrose 50% Injectable 25 Gram(s) IV Push once  furosemide    Tablet 80 milliGRAM(s) Oral daily  glucagon  Injectable 1 milliGRAM(s) IntraMuscular once PRN  influenza   Vaccine 0.5 milliLiter(s) IntraMuscular once  insulin glargine Injectable (LANTUS) 60 Unit(s) SubCutaneous at bedtime  insulin lispro (HumaLOG) corrective regimen sliding scale   SubCutaneous three times a day before meals  insulin lispro (HumaLOG) corrective regimen sliding scale   SubCutaneous at bedtime  insulin lispro Injectable (HumaLOG) 20 Unit(s) SubCutaneous three times a day with meals  loratadine 10 milliGRAM(s) Oral daily  metoprolol succinate ER 50 milliGRAM(s) Oral every 12 hours  pantoprazole    Tablet 40 milliGRAM(s) Oral before breakfast  potassium chloride    Tablet ER 40 milliEquivalent(s) Oral once  sacubitril 49 mG/valsartan 51 mG 1 Tablet(s) Oral two times a day  sodium chloride 0.9%. 500 milliLiter(s) IV Continuous <Continuous>  tamsulosin 0.4 milliGRAM(s) Oral at bedtime    PAST MEDICAL & SURGICAL HISTORY:  H/O gastroesophageal reflux (GERD)    History of COPD    History of ischemic cardiomyopathy    Heart failure with reduced ejection fraction    Hypertension    AICD (automatic cardioverter/defibrillator) present    Diabetes    Stented coronary artery    H/O vasectomy  20 yrs ago ()      Vitals:  T(C): 36.9 (20 @ 18:23), Max: 36.9 (20 @ 18:23)  HR: 99 (20 @ 18:23) (81 - 99)  BP: 104/66 (20 @ 18:23) (97/66 - 119/74)  BP(mean): --  RR: 18 (20 @ 18:23) (12 - 19)  SpO2: 98% (20 @ 18:23) (95% - 98%)  Wt(kg): --  Daily Height in cm: 195.58 (17 Sep 2020 12:43)    Daily Weight in k.5 (17 Sep 2020 07:29)  I&O's Summary    16 Sep 2020 07:  -  17 Sep 2020 07:00  --------------------------------------------------------  IN: 780 mL / OUT: 700 mL / NET: 80 mL    17 Sep 2020 07:01  -  17 Sep 2020 19:30  --------------------------------------------------------  IN: 0 mL / OUT: 400 mL / NET: -400 mL        Physical Exam:  Appearance: Normal, well groomed, NAD  Eyes: PERRLA, EOMI, pink conjunctiva, no scleral icterus   HENT: Normal oral mucosa  Cardiovascular: RRR, S1, S2, no murmur, rub, or gallop; +edema; +JVD  Procedural access site: clean, dry, intact without hematoma  Respiratory: diminished breath sounds throughout  Gastrointestinal: Soft, non-tender, non-distended, BS+  Musculoskeletal: No clubbing or joint deformity   Neurologic: No focal weakness  Lymphatic: No lymphadenopathy  Psychiatry: AAOx3 with appropriate mood and affect  Skin: No rashes, ecchymoses, or cyanosis; +tattoos                          11.9   8.62  )-----------( 241      ( 17 Sep 2020 06:24 )             37.8     09-17    140  |  101  |  28<H>  ----------------------------<  55<L>  3.2<L>   |  26  |  1.49<H>    Ca    9.5      17 Sep 2020 06:23      PT/INR - ( 17 Sep 2020 07:58 )   PT: 13.3 sec;   INR: 1.12 ratio         PTT - ( 17 Sep 2020 07:58 )  PTT:31.8 sec

## 2020-09-17 NOTE — PROGRESS NOTE ADULT - ASSESSMENT
59M PMH T2DM, HTN, CAD/MI s/p stents (most recent 2/2018), HFrEF (10-15%), ICD,  ischemic cardiomyopathy, COPD, HTN, GERD pw fever, n/v, back pain IN ED was found to have T 102.9,, BP 63/40  elevated BUN/SCr 32/1.88, hyperglycemic 247, elevated proBNP 1158, lactate Started on levophed. hypotension requiring pressor support.  now  s/p ICD extraction     1- ckd III  2- right hydro and atrophy kidney   3- chf       cr baseline   renal sono to evaluate right kidney lesion seen on ct scan -ordered   rocephin 2 g iv qd  entresto 49/51 bid   decrease lasix to 40 mg daily and add aldactone 25 mg qd if hypokalemia recurs   replete kcl

## 2020-09-17 NOTE — PROGRESS NOTE ADULT - SUBJECTIVE AND OBJECTIVE BOX
INTERVAL HPI/OVERNIGHT EVENTS:     Patient ambulating well, s/p colonoscopy tolerated procedure.   No complaints       T(C): 36.9 (09-17-20 @ 18:23), Max: 36.9 (09-17-20 @ 18:23)  HR: 99 (09-17-20 @ 18:23) (81 - 99)  BP: 104/66 (09-17-20 @ 18:23) (97/66 - 119/74)  RR: 18 (09-17-20 @ 18:23) (12 - 19)  SpO2: 98% (09-17-20 @ 18:23) (95% - 98%)      MEDICATIONS  (STANDING):  aspirin  chewable 81 milliGRAM(s) Oral daily  atorvastatin 80 milliGRAM(s) Oral at bedtime  cefTRIAXone   IVPB 2000 milliGRAM(s) IV Intermittent every 24 hours  chlorhexidine 4% Liquid 1 Application(s) Topical <User Schedule>  chlorhexidine 4% Liquid 1 Application(s) Topical <User Schedule>  dextrose 5%. 1000 milliLiter(s) (50 mL/Hr) IV Continuous <Continuous>  dextrose 50% Injectable 12.5 Gram(s) IV Push once  dextrose 50% Injectable 25 Gram(s) IV Push once  dextrose 50% Injectable 25 Gram(s) IV Push once  furosemide    Tablet 80 milliGRAM(s) Oral daily  influenza   Vaccine 0.5 milliLiter(s) IntraMuscular once  insulin glargine Injectable (LANTUS) 60 Unit(s) SubCutaneous at bedtime  insulin lispro (HumaLOG) corrective regimen sliding scale   SubCutaneous three times a day before meals  insulin lispro (HumaLOG) corrective regimen sliding scale   SubCutaneous at bedtime  insulin lispro Injectable (HumaLOG) 20 Unit(s) SubCutaneous three times a day with meals  loratadine 10 milliGRAM(s) Oral daily  metoprolol succinate ER 50 milliGRAM(s) Oral every 12 hours  pantoprazole    Tablet 40 milliGRAM(s) Oral before breakfast  potassium chloride    Tablet ER 40 milliEquivalent(s) Oral once  sacubitril 49 mG/valsartan 51 mG 1 Tablet(s) Oral two times a day  sodium chloride 0.9%. 500 milliLiter(s) (30 mL/Hr) IV Continuous <Continuous>  tamsulosin 0.4 milliGRAM(s) Oral at bedtime    MEDICATIONS  (PRN):  acetaminophen   Tablet .. 650 milliGRAM(s) Oral every 6 hours PRN Temp greater or equal to 38C (100.4F), Mild Pain (1 - 3)  ALBUTerol    90 MICROgram(s) HFA Inhaler 2 Puff(s) Inhalation every 12 hours PRN Shortness of Breath and/or Wheezing  dextrose 40% Gel 15 Gram(s) Oral once PRN Blood Glucose LESS THAN 70 milliGRAM(s)/deciliter  glucagon  Injectable 1 milliGRAM(s) IntraMuscular once PRN Glucose LESS THAN 70 milligrams/deciliter  PHYSICAL EXAM:    Constitutional: NAD. well-developed; well-groomed; well-nourished;  HEENT: AT/NC, PERRLA; EOMI, MMM, no oropharyngeal lesions, no erythema, no exudates,   Respiratory: CTAB. equal aeration bilaterally. no wheezing, no crackes, no rhonchi.   Cardiovascular: RRR, no M/R/G. no JVD  Gastrointestinal: soft; NT/ND, obese, +BS, no rebounding tenderness / guarding / HSM / mass / ascites.  Extremities: no clubbing; no cyanosis; 1+ LE edema to shins, non-tender to palpation, DP and Radial pulses intact.  Skin: warm and dry; color normal: no rash: no ulcers  Neurological: A&Ox 3; responds to pain; responds to verbal commands; CN nerves grossly intact.              `                                                   11.9   8.62  )-----------( 241      ( 17 Sep 2020 06:24 )             37.8             PT/INR - ( 17 Sep 2020 07:58 )   PT: 13.3 sec;   INR: 1.12 ratio         PTT - ( 17 Sep 2020 07:58 )  PTT:31.8 sec  140|101|28<55  3.2|26|1.49  9.5,--,--  09-17 @ 06:23    GI PCR Panel, Stool (collected 09 Sep 2020 10:21)  Source: .Stool Feces sarina jose  Final Report (09 Sep 2020 12:58):    GI PCR Results: NOT detected    *******Please Note:*******    GI panel PCR evaluates for:    Campylobacter, Plesiomonas shigelloides, Salmonella,    Vibrio, Yersinia enterocolitica, Enteroaggregative    Escherichia coli (EAEC), Enteropathogenic E.coli (EPEC),    Enterotoxigenic E. coli (ETEC) lt/st, Shiga-like    toxin-producing E. coli (STEC) stx1/stx2,    Shigella/ Enteroinvasive E. coli (EIEC), Cryptosporidium,    Cyclospora cayetanensis, Entamoeba histolytica,    Giardia lamblia, Adenovirus F 40/41, Astrovirus,    Norovirus GI/GII, Rotavirus A, Sapovirus    Culture - Stool (09.09.20 @ 10:21)    Specimen Source: .Stool Feces  sarina jose    Culture Results:   No enteric pathogens to date: Final culture pending    Culture - Blood (09.09.20 @ 00:57)    Gram Stain:   Growth in aerobic and anaerobic bottles: Gram Positive Cocci in Pairs and  Chains    Specimen Source: .Blood Blood-Peripheral    Culture Results:   Growth in aerobic and anaerobic bottles: Streptococcus gallolyticus  See previous culture [id number]        IMAGING:      < from: CT Chest No Cont (09.09.20 @ 01:27) >  IMPRESSION:  No pneumonia.    Emphysema.    No bowel or obstruction.    Hepatomegaly and diffuse hepatic steatosis.    Splenomegaly.    Atrophic right kidney, with severe hydronephrosis. A 0.7 cm soft tissue density is noted in the interpolar region of the right kidney and is incompletely characterized. Ultrasound may be helpful for further evaluation.    < end of copied text >

## 2020-09-17 NOTE — PROGRESS NOTE ADULT - ASSESSMENT
Assessment: 59M PMH T2DM, HTN, CAD/MI s/p stents (most recent 2/2018), ICM w/ HFrEF (10-15%) s/p AICD, COPD (not on home O2), HTN, GERD, BPH, who is admitted for sepsis 2/2 gram positive bacteremia.   s/p MICU stay       # Strep Gallolyticus bacteremia  -Continue with Ceftriaxone   -MRI Lumbar spine findings consistent with Acute Osteomyelitis.    -s/p Screening colonoscopy today s/p polyps removed, follow up biopsy.     # Hypotension secondary to Septic Shock  - resolved, off Midodrine   - continue furosemide, Metoprolol    Diuresing as needed    # HFrEF s/p AICD  - most recent echo from 12/2019: EF 10-15% sev global LV systolic dysfunction. eccentric LVH  - cardiology following   - resumed b-blocker but holding onto rest of HF meds.  - s/p AICD extraction in setting of Group D Strep Bacteremia  -Plan for AICD placement am tomorrow.     # CAD s/p stent  - c/w home DAPT (ASA off Plavix fro colonoscopy on Thursday.   -High dose statin.      GI/NUTRITION  - GI PCR negative.   - Continuing home pantoprazole po 40mg  - diabetic diet.  - GI consulted, recommend patient to be off Plavix for 5 days for colonoscopy.   -Resume Plavix tomorrow.       - MANJIT improving -   - continuing home tamsulosin 0.4mg  -renal following       Hematologic  - Monitor CBC, currently stable    #DVT ppx  - SCDs for DVT ppx    # Hyperglycemia   - Pt w/ hx of IDDM on home Lantus 66u qhs and Humalog 18 units pre meal   - Will monitor FSS with moderate ISS    Ethics  - GOC discussed Patient wants full code.

## 2020-09-17 NOTE — PROGRESS NOTE ADULT - SUBJECTIVE AND OBJECTIVE BOX
[facility]   [phone]  RENAL FOLLOW UP NOTE  --------------------------------------------------------------------------------  Chief Complaint:    24 hour events/subjective:    seen earlier. s/p colonoscopy   states overall feels well     PAST HISTORY  --------------------------------------------------------------------------------  No significant changes to PMH, PSH, FHx, SHx, unless otherwise noted    ALLERGIES & MEDICATIONS  --------------------------------------------------------------------------------  Allergies    No Known Allergies    Intolerances      Standing Inpatient Medications  aspirin  chewable 81 milliGRAM(s) Oral daily  atorvastatin 80 milliGRAM(s) Oral at bedtime  cefTRIAXone   IVPB 2000 milliGRAM(s) IV Intermittent every 24 hours  chlorhexidine 4% Liquid 1 Application(s) Topical <User Schedule>  chlorhexidine 4% Liquid 1 Application(s) Topical <User Schedule>  dextrose 5%. 1000 milliLiter(s) IV Continuous <Continuous>  dextrose 50% Injectable 12.5 Gram(s) IV Push once  dextrose 50% Injectable 25 Gram(s) IV Push once  dextrose 50% Injectable 25 Gram(s) IV Push once  furosemide    Tablet 80 milliGRAM(s) Oral daily  influenza   Vaccine 0.5 milliLiter(s) IntraMuscular once  insulin glargine Injectable (LANTUS) 60 Unit(s) SubCutaneous at bedtime  insulin lispro (HumaLOG) corrective regimen sliding scale   SubCutaneous three times a day before meals  insulin lispro (HumaLOG) corrective regimen sliding scale   SubCutaneous at bedtime  insulin lispro Injectable (HumaLOG) 20 Unit(s) SubCutaneous three times a day with meals  loratadine 10 milliGRAM(s) Oral daily  metoprolol succinate ER 50 milliGRAM(s) Oral every 12 hours  pantoprazole    Tablet 40 milliGRAM(s) Oral before breakfast  potassium chloride    Tablet ER 40 milliEquivalent(s) Oral once  sacubitril 49 mG/valsartan 51 mG 1 Tablet(s) Oral two times a day  sodium chloride 0.9%. 500 milliLiter(s) IV Continuous <Continuous>  tamsulosin 0.4 milliGRAM(s) Oral at bedtime    PRN Inpatient Medications  acetaminophen   Tablet .. 650 milliGRAM(s) Oral every 6 hours PRN  ALBUTerol    90 MICROgram(s) HFA Inhaler 2 Puff(s) Inhalation every 12 hours PRN  dextrose 40% Gel 15 Gram(s) Oral once PRN  glucagon  Injectable 1 milliGRAM(s) IntraMuscular once PRN      REVIEW OF SYSTEMS  --------------------------------------------------------------------------------    Gen: denies fevers/chills,  CVS: denies chest pain/palpitations  Resp: denies SOB/Cough  GI: Denies N/V/Abd pain  : Denies dysuria/oliguria/hematuria    All other systems were reviewed and are negative, except as noted.    VITALS/PHYSICAL EXAM  --------------------------------------------------------------------------------  T(C): 36.9 (09-17-20 @ 18:23), Max: 36.9 (09-17-20 @ 18:23)  HR: 99 (09-17-20 @ 18:23) (81 - 99)  BP: 104/66 (09-17-20 @ 18:23) (97/66 - 119/74)  RR: 18 (09-17-20 @ 18:23) (12 - 19)  SpO2: 98% (09-17-20 @ 18:23) (95% - 98%)  Wt(kg): --  Height (cm): 195.6 (09-17-20 @ 12:43)  Weight (kg): 127 (09-17-20 @ 12:43)  BMI (kg/m2): 33.2 (09-17-20 @ 12:43)  BSA (m2): 2.58 (09-17-20 @ 12:43)      09-16-20 @ 07:01  -  09-17-20 @ 07:00  --------------------------------------------------------  IN: 780 mL / OUT: 700 mL / NET: 80 mL    09-17-20 @ 07:01  -  09-17-20 @ 21:18  --------------------------------------------------------  IN: 240 mL / OUT: 400 mL / NET: -160 mL      Physical Exam:  	    Gen: Non toxic comfortable appearing   	no jvd  	Pulm: decrease bs  no rales or ronchi or wheezing  	CV: RRR, S1S2; no rub  	Abd: +BS, soft, nontender/nondistended  	: No suprapubic tenderness  	UE: Warm, no cyanosis  no clubbing,  no edema  	LE: Warm, no cyanosis  no clubbing, no edema  	Neuro: alert and oriented. speech coherent       LABS/STUDIES  --------------------------------------------------------------------------------              11.9   8.62  >-----------<  241      [09-17-20 @ 06:24]              37.8     140  |  101  |  28  ----------------------------<  55      [09-17-20 @ 06:23]  3.2   |  26  |  1.49        Ca     9.5     [09-17-20 @ 06:23]      PT/INR: PT 13.3 , INR 1.12       [09-17-20 @ 07:58]  PTT: 31.8       [09-17-20 @ 07:58]      Creatinine Trend:  SCr 1.49 [09-17 @ 06:23]  SCr 1.52 [09-15 @ 09:12]  SCr 1.67 [09-14 @ 07:36]  SCr 1.58 [09-13 @ 07:12]  SCr 1.63 [09-12 @ 06:04]              Urinalysis - [09-09-20 @ 07:23]      Color Light Yellow / Appearance Clear / SG 1.014 / pH 6.0      Gluc Trace / Ketone Negative  / Bili Negative / Urobili Negative       Blood Trace / Protein Trace / Leuk Est Negative / Nitrite Negative      RBC 2 / WBC 0 / Hyaline 4 / Gran  / Sq Epi  / Non Sq Epi 0 / Bacteria Negative      HbA1c 8.9      [12-16-19 @ 08:15]  TSH 1.74      [09-09-20 @ 07:25]

## 2020-09-17 NOTE — PROGRESS NOTE ADULT - SUBJECTIVE AND OBJECTIVE BOX
24H hour events:   No acute events overnight. Tele: SR no ectopy. Denies chest pain, palpitations, dizziness, lightheadedness, and shortness of breath.     MEDICATIONS:  aspirin  chewable 81 milliGRAM(s) Oral daily  furosemide    Tablet 80 milliGRAM(s) Oral daily  metoprolol succinate ER 50 milliGRAM(s) Oral every 12 hours  sacubitril 49 mG/valsartan 51 mG 1 Tablet(s) Oral two times a day  tamsulosin 0.4 milliGRAM(s) Oral at bedtime  cefTRIAXone   IVPB 2000 milliGRAM(s) IV Intermittent every 24 hours  ALBUTerol    90 MICROgram(s) HFA Inhaler 2 Puff(s) Inhalation every 12 hours PRN  loratadine 10 milliGRAM(s) Oral daily  acetaminophen   Tablet .. 650 milliGRAM(s) Oral every 6 hours PRN  pantoprazole    Tablet 40 milliGRAM(s) Oral before breakfast  atorvastatin 80 milliGRAM(s) Oral at bedtime  dextrose 40% Gel 15 Gram(s) Oral once PRN  dextrose 50% Injectable 12.5 Gram(s) IV Push once  dextrose 50% Injectable 25 Gram(s) IV Push once  dextrose 50% Injectable 25 Gram(s) IV Push once  glucagon  Injectable 1 milliGRAM(s) IntraMuscular once PRN  insulin glargine Injectable (LANTUS) 60 Unit(s) SubCutaneous at bedtime  insulin lispro (HumaLOG) corrective regimen sliding scale   SubCutaneous three times a day before meals  insulin lispro (HumaLOG) corrective regimen sliding scale   SubCutaneous at bedtime  insulin lispro Injectable (HumaLOG) 20 Unit(s) SubCutaneous three times a day with meals    chlorhexidine 4% Liquid 1 Application(s) Topical <User Schedule>  dextrose 5%. 1000 milliLiter(s) IV Continuous <Continuous>  influenza   Vaccine 0.5 milliLiter(s) IntraMuscular once      REVIEW OF SYSTEMS:  Complete 10point ROS negative.    PHYSICAL EXAM:  T(C): 36.6 (09-17-20 @ 11:00), Max: 36.9 (09-16-20 @ 12:22)  HR: 81 (09-17-20 @ 11:00) (81 - 84)  BP: 100/55 (09-17-20 @ 11:00) (98/63 - 113/75)  RR: 19 (09-17-20 @ 11:00) (18 - 19)  SpO2: 98% (09-17-20 @ 11:00) (97% - 98%)  Wt(kg): --  I&O's Summary    16 Sep 2020 07:01  -  17 Sep 2020 07:00  --------------------------------------------------------  IN: 780 mL / OUT: 700 mL / NET: 80 mL    17 Sep 2020 07:01  -  17 Sep 2020 11:43  --------------------------------------------------------  IN: 0 mL / OUT: 400 mL / NET: -400 mL        Appearance: Normal	  Cardiovascular: Normal S1 S2, No JVD, No murmurs, No edema  Respiratory: Lungs clear to auscultation	  Psychiatry: A & O x 3, Mood & affect appropriate  Skin: No rashes, No ecchymoses, No cyanosis, left chest wall extraction site clean, dry, intact open to air with steri strips in place	  Extremities: Normal range of motion, No clubbing, cyanosis or edema  Vascular: Peripheral pulses palpable 2+ bilaterally        LABS:	 	                        11.9   8.62  )-----------( 241      ( 17 Sep 2020 06:24 )             37.8     09-17    140  |  101  |  28<H>  ----------------------------<  55<L>  3.2<L>   |  26  |  1.49<H>    Ca    9.5      17 Sep 2020 06:23      PT/INR - ( 17 Sep 2020 07:58 )   PT: 13.3 sec;   INR: 1.12 ratio    PTT - ( 17 Sep 2020 07:58 )  PTT:31.8 sec      TELEMETRY: SR 70-90 bpm

## 2020-09-17 NOTE — PROGRESS NOTE ADULT - ASSESSMENT
Assessment  DMT2:  59y Male with DM T2 with hyperglycemia, A1C 11.2%, on basal bolus insulin, patient received partial-dose Lantus last night 2/2 NPO status, had hypoglycemic episode this AM, FS now improving. Patient NPO for colonoscopy.  Septic Shock: Hydronephrosis, On IV ABx, stable, monitored.  Obesity: No strict exercise routines, not on any weight loss program, neither on low calorie diet.          Cortney Flanagan MD  Cell: [phone]  Office: [phone]               Assessment  DMT2:  59y Male with DM T2 with hyperglycemia, A1C 11.2%, on basal bolus insulin, patient received partial-dose Lantus last night 2/2 NPO status, had hypoglycemic episode this AM, FS now improving. Patient NPO for colonoscopy.  Septic Shock: Hydronephrosis, On IV ABx, stable, monitored.  Obesity: No strict exercise routines, not on any weight loss program, neither on low calorie diet.            Cortney Flanagan MD  Cell: [phone]  Office: [phone]

## 2020-09-18 LAB
ALBUMIN SERPL ELPH-MCNC: 3.5 G/DL — SIGNIFICANT CHANGE UP (ref 3.3–5)
ALP SERPL-CCNC: 79 U/L — SIGNIFICANT CHANGE UP (ref 40–120)
ALT FLD-CCNC: 34 U/L — SIGNIFICANT CHANGE UP (ref 10–45)
ANION GAP SERPL CALC-SCNC: 10 MMOL/L — SIGNIFICANT CHANGE UP (ref 5–17)
ANION GAP SERPL CALC-SCNC: 12 MMOL/L — SIGNIFICANT CHANGE UP (ref 5–17)
ANISOCYTOSIS BLD QL: SLIGHT — SIGNIFICANT CHANGE UP
AST SERPL-CCNC: 21 U/L — SIGNIFICANT CHANGE UP (ref 10–40)
BASOPHILS # BLD AUTO: 0 K/UL — SIGNIFICANT CHANGE UP (ref 0–0.2)
BASOPHILS NFR BLD AUTO: 0 % — SIGNIFICANT CHANGE UP (ref 0–2)
BILIRUB SERPL-MCNC: 0.4 MG/DL — SIGNIFICANT CHANGE UP (ref 0.2–1.2)
BUN SERPL-MCNC: 26 MG/DL — HIGH (ref 7–23)
BUN SERPL-MCNC: 28 MG/DL — HIGH (ref 7–23)
CALCIUM SERPL-MCNC: 8.9 MG/DL — SIGNIFICANT CHANGE UP (ref 8.4–10.5)
CALCIUM SERPL-MCNC: 9.3 MG/DL — SIGNIFICANT CHANGE UP (ref 8.4–10.5)
CHLORIDE SERPL-SCNC: 103 MMOL/L — SIGNIFICANT CHANGE UP (ref 96–108)
CHLORIDE SERPL-SCNC: 105 MMOL/L — SIGNIFICANT CHANGE UP (ref 96–108)
CO2 SERPL-SCNC: 21 MMOL/L — LOW (ref 22–31)
CO2 SERPL-SCNC: 22 MMOL/L — SIGNIFICANT CHANGE UP (ref 22–31)
CREAT SERPL-MCNC: 1.5 MG/DL — HIGH (ref 0.5–1.3)
CREAT SERPL-MCNC: 1.61 MG/DL — HIGH (ref 0.5–1.3)
ELLIPTOCYTES BLD QL SMEAR: SLIGHT — SIGNIFICANT CHANGE UP
EOSINOPHIL # BLD AUTO: 0.31 K/UL — SIGNIFICANT CHANGE UP (ref 0–0.5)
EOSINOPHIL NFR BLD AUTO: 2.6 % — SIGNIFICANT CHANGE UP (ref 0–6)
GAS PNL BLDA: SIGNIFICANT CHANGE UP
GLUCOSE BLDC GLUCOMTR-MCNC: 196 MG/DL — HIGH (ref 70–99)
GLUCOSE BLDC GLUCOMTR-MCNC: 215 MG/DL — HIGH (ref 70–99)
GLUCOSE BLDC GLUCOMTR-MCNC: 260 MG/DL — HIGH (ref 70–99)
GLUCOSE BLDC GLUCOMTR-MCNC: 262 MG/DL — HIGH (ref 70–99)
GLUCOSE BLDC GLUCOMTR-MCNC: 325 MG/DL — HIGH (ref 70–99)
GLUCOSE SERPL-MCNC: 205 MG/DL — HIGH (ref 70–99)
GLUCOSE SERPL-MCNC: 271 MG/DL — HIGH (ref 70–99)
HCT VFR BLD CALC: 35.6 % — LOW (ref 39–50)
HCT VFR BLD CALC: 37.5 % — LOW (ref 39–50)
HGB BLD-MCNC: 11 G/DL — LOW (ref 13–17)
HGB BLD-MCNC: 11.8 G/DL — LOW (ref 13–17)
LYMPHOCYTES # BLD AUTO: 0.41 K/UL — LOW (ref 1–3.3)
LYMPHOCYTES # BLD AUTO: 3.5 % — LOW (ref 13–44)
MANUAL SMEAR VERIFICATION: SIGNIFICANT CHANGE UP
MCHC RBC-ENTMCNC: 25.6 PG — LOW (ref 27–34)
MCHC RBC-ENTMCNC: 25.9 PG — LOW (ref 27–34)
MCHC RBC-ENTMCNC: 30.9 GM/DL — LOW (ref 32–36)
MCHC RBC-ENTMCNC: 31.5 GM/DL — LOW (ref 32–36)
MCV RBC AUTO: 82.4 FL — SIGNIFICANT CHANGE UP (ref 80–100)
MCV RBC AUTO: 83 FL — SIGNIFICANT CHANGE UP (ref 80–100)
MICROCYTES BLD QL: SLIGHT — SIGNIFICANT CHANGE UP
MONOCYTES # BLD AUTO: 0.2 K/UL — SIGNIFICANT CHANGE UP (ref 0–0.9)
MONOCYTES NFR BLD AUTO: 1.7 % — LOW (ref 2–14)
MYELOCYTES NFR BLD: 0.9 % — HIGH (ref 0–0)
NEUTROPHILS # BLD AUTO: 10.7 K/UL — HIGH (ref 1.8–7.4)
NEUTROPHILS NFR BLD AUTO: 89.5 % — HIGH (ref 43–77)
NEUTS BAND # BLD: 0.9 % — SIGNIFICANT CHANGE UP (ref 0–8)
NRBC # BLD: 0 /100 WBCS — SIGNIFICANT CHANGE UP (ref 0–0)
PLAT MORPH BLD: NORMAL — SIGNIFICANT CHANGE UP
PLATELET # BLD AUTO: 225 K/UL — SIGNIFICANT CHANGE UP (ref 150–400)
PLATELET # BLD AUTO: 239 K/UL — SIGNIFICANT CHANGE UP (ref 150–400)
POIKILOCYTOSIS BLD QL AUTO: SLIGHT — SIGNIFICANT CHANGE UP
POLYCHROMASIA BLD QL SMEAR: SLIGHT — SIGNIFICANT CHANGE UP
POTASSIUM SERPL-MCNC: 4.3 MMOL/L — SIGNIFICANT CHANGE UP (ref 3.5–5.3)
POTASSIUM SERPL-MCNC: 4.5 MMOL/L — SIGNIFICANT CHANGE UP (ref 3.5–5.3)
POTASSIUM SERPL-SCNC: 4.3 MMOL/L — SIGNIFICANT CHANGE UP (ref 3.5–5.3)
POTASSIUM SERPL-SCNC: 4.5 MMOL/L — SIGNIFICANT CHANGE UP (ref 3.5–5.3)
PROT SERPL-MCNC: 6.4 G/DL — SIGNIFICANT CHANGE UP (ref 6–8.3)
RBC # BLD: 4.29 M/UL — SIGNIFICANT CHANGE UP (ref 4.2–5.8)
RBC # BLD: 4.55 M/UL — SIGNIFICANT CHANGE UP (ref 4.2–5.8)
RBC # FLD: 15.9 % — HIGH (ref 10.3–14.5)
RBC # FLD: 15.9 % — HIGH (ref 10.3–14.5)
RBC BLD AUTO: ABNORMAL
SARS-COV-2 RNA SPEC QL NAA+PROBE: SIGNIFICANT CHANGE UP
SODIUM SERPL-SCNC: 136 MMOL/L — SIGNIFICANT CHANGE UP (ref 135–145)
SODIUM SERPL-SCNC: 137 MMOL/L — SIGNIFICANT CHANGE UP (ref 135–145)
VARIANT LYMPHS # BLD: 0.9 % — SIGNIFICANT CHANGE UP (ref 0–6)
WBC # BLD: 11.84 K/UL — HIGH (ref 3.8–10.5)
WBC # BLD: 8.54 K/UL — SIGNIFICANT CHANGE UP (ref 3.8–10.5)
WBC # FLD AUTO: 11.84 K/UL — HIGH (ref 3.8–10.5)
WBC # FLD AUTO: 8.54 K/UL — SIGNIFICANT CHANGE UP (ref 3.8–10.5)

## 2020-09-18 PROCEDURE — 71045 X-RAY EXAM CHEST 1 VIEW: CPT | Mod: 26

## 2020-09-18 PROCEDURE — 99291 CRITICAL CARE FIRST HOUR: CPT

## 2020-09-18 PROCEDURE — 93010 ELECTROCARDIOGRAM REPORT: CPT

## 2020-09-18 PROCEDURE — 99232 SBSQ HOSP IP/OBS MODERATE 35: CPT

## 2020-09-18 RX ORDER — FENTANYL CITRATE 50 UG/ML
25 INJECTION INTRAVENOUS
Refills: 0 | Status: DISCONTINUED | OUTPATIENT
Start: 2020-09-18 | End: 2020-09-18

## 2020-09-18 RX ORDER — HYDROMORPHONE HYDROCHLORIDE 2 MG/ML
0.25 INJECTION INTRAMUSCULAR; INTRAVENOUS; SUBCUTANEOUS
Refills: 0 | Status: DISCONTINUED | OUTPATIENT
Start: 2020-09-18 | End: 2020-09-18

## 2020-09-18 RX ORDER — DOBUTAMINE HCL 250MG/20ML
5 VIAL (ML) INTRAVENOUS
Qty: 500 | Refills: 0 | Status: DISCONTINUED | OUTPATIENT
Start: 2020-09-18 | End: 2020-09-18

## 2020-09-18 RX ORDER — CEFTRIAXONE 500 MG/1
2 INJECTION, POWDER, FOR SOLUTION INTRAMUSCULAR; INTRAVENOUS
Qty: 66 | Refills: 0
Start: 2020-09-18 | End: 2020-10-20

## 2020-09-18 RX ORDER — PHENYLEPHRINE HYDROCHLORIDE 10 MG/ML
0.5 INJECTION INTRAVENOUS
Qty: 40 | Refills: 0 | Status: DISCONTINUED | OUTPATIENT
Start: 2020-09-18 | End: 2020-09-18

## 2020-09-18 RX ORDER — VANCOMYCIN HCL 1 G
1000 VIAL (EA) INTRAVENOUS ONCE
Refills: 0 | Status: COMPLETED | OUTPATIENT
Start: 2020-09-18 | End: 2020-09-18

## 2020-09-18 RX ORDER — ONDANSETRON 8 MG/1
4 TABLET, FILM COATED ORAL ONCE
Refills: 0 | Status: DISCONTINUED | OUTPATIENT
Start: 2020-09-18 | End: 2020-09-18

## 2020-09-18 RX ORDER — FUROSEMIDE 40 MG
40 TABLET ORAL DAILY
Refills: 0 | Status: DISCONTINUED | OUTPATIENT
Start: 2020-09-19 | End: 2020-09-19

## 2020-09-18 RX ORDER — DEXAMETHASONE 0.5 MG/5ML
8 ELIXIR ORAL ONCE
Refills: 0 | Status: DISCONTINUED | OUTPATIENT
Start: 2020-09-18 | End: 2020-09-18

## 2020-09-18 RX ADMIN — ATORVASTATIN CALCIUM 80 MILLIGRAM(S): 80 TABLET, FILM COATED ORAL at 21:28

## 2020-09-18 RX ADMIN — Medication 50 MILLIGRAM(S): at 21:28

## 2020-09-18 RX ADMIN — LORATADINE 10 MILLIGRAM(S): 10 TABLET ORAL at 17:55

## 2020-09-18 RX ADMIN — PANTOPRAZOLE SODIUM 40 MILLIGRAM(S): 20 TABLET, DELAYED RELEASE ORAL at 05:20

## 2020-09-18 RX ADMIN — Medication 250 MILLIGRAM(S): at 23:23

## 2020-09-18 RX ADMIN — Medication 2: at 21:29

## 2020-09-18 RX ADMIN — CHLORHEXIDINE GLUCONATE 1 APPLICATION(S): 213 SOLUTION TOPICAL at 05:21

## 2020-09-18 RX ADMIN — Medication 6: at 13:20

## 2020-09-18 RX ADMIN — Medication 81 MILLIGRAM(S): at 17:55

## 2020-09-18 RX ADMIN — SACUBITRIL AND VALSARTAN 1 TABLET(S): 24; 26 TABLET, FILM COATED ORAL at 08:38

## 2020-09-18 RX ADMIN — SACUBITRIL AND VALSARTAN 1 TABLET(S): 24; 26 TABLET, FILM COATED ORAL at 21:52

## 2020-09-18 RX ADMIN — TAMSULOSIN HYDROCHLORIDE 0.4 MILLIGRAM(S): 0.4 CAPSULE ORAL at 21:28

## 2020-09-18 RX ADMIN — INSULIN GLARGINE 60 UNIT(S): 100 INJECTION, SOLUTION SUBCUTANEOUS at 21:28

## 2020-09-18 RX ADMIN — CEFTRIAXONE 100 MILLIGRAM(S): 500 INJECTION, POWDER, FOR SOLUTION INTRAMUSCULAR; INTRAVENOUS at 21:27

## 2020-09-18 RX ADMIN — Medication 2: at 08:38

## 2020-09-18 RX ADMIN — Medication 6: at 17:55

## 2020-09-18 NOTE — PROGRESS NOTE ADULT - SUBJECTIVE AND OBJECTIVE BOX
Chief Complaint:  Patient is a 59y old  Male who presents with a chief complaint of fever, cough, emesis (17 Sep 2020 21:15)      Interval Events:   Patient had colonoscopy yesterday.  Multiple polyps removed, discussed with patient    Allergies:  No Known Allergies      Hospital Medications:  acetaminophen   Tablet .. 650 milliGRAM(s) Oral every 6 hours PRN  ALBUTerol    90 MICROgram(s) HFA Inhaler 2 Puff(s) Inhalation every 12 hours PRN  aspirin  chewable 81 milliGRAM(s) Oral daily  atorvastatin 80 milliGRAM(s) Oral at bedtime  cefTRIAXone   IVPB 2000 milliGRAM(s) IV Intermittent every 24 hours  chlorhexidine 4% Liquid 1 Application(s) Topical <User Schedule>  dextrose 40% Gel 15 Gram(s) Oral once PRN  dextrose 5%. 1000 milliLiter(s) IV Continuous <Continuous>  dextrose 50% Injectable 12.5 Gram(s) IV Push once  dextrose 50% Injectable 25 Gram(s) IV Push once  dextrose 50% Injectable 25 Gram(s) IV Push once  furosemide    Tablet 80 milliGRAM(s) Oral daily  glucagon  Injectable 1 milliGRAM(s) IntraMuscular once PRN  influenza   Vaccine 0.5 milliLiter(s) IntraMuscular once  insulin glargine Injectable (LANTUS) 60 Unit(s) SubCutaneous at bedtime  insulin lispro (HumaLOG) corrective regimen sliding scale   SubCutaneous three times a day before meals  insulin lispro (HumaLOG) corrective regimen sliding scale   SubCutaneous at bedtime  insulin lispro Injectable (HumaLOG) 20 Unit(s) SubCutaneous three times a day with meals  loratadine 10 milliGRAM(s) Oral daily  metoprolol succinate ER 50 milliGRAM(s) Oral every 12 hours  pantoprazole    Tablet 40 milliGRAM(s) Oral before breakfast  sacubitril 49 mG/valsartan 51 mG 1 Tablet(s) Oral two times a day  sodium chloride 0.9%. 500 milliLiter(s) IV Continuous <Continuous>  tamsulosin 0.4 milliGRAM(s) Oral at bedtime      PMHX/PSHX:  H/O gastroesophageal reflux (GERD)    History of COPD    History of ischemic cardiomyopathy    Heart failure with reduced ejection fraction    Hypertension    AICD (automatic cardioverter/defibrillator) present    Diabetes    Stented coronary artery    H/O vasectomy    No significant past surgical history        Family history:  Family history of cardiac disorder    Family history of COPD (chronic obstructive pulmonary disease) (Sibling)    No pertinent family history in first degree relatives        ROS:  General: no night sweats, wt loss  CV: no pain, palpitation  Resp: no cough wheezing  Muscles: no weakness or pain  Endocrine: no polyuria, polydipsia, cold/heat intolerance  Heme: No petechia, ecchymosis    PHYSICAL EXAM:   GENERAL:  NAD  HEENT:  NC/AT,  conjunctivae clear, sclera -anicteric  CHEST:  Full & symmetric excursion, no increased  HEART:  Regular rhythm  ABDOMEN:  Soft, non-tender, non-distended, normoactive bowel sounds,  no masses ,no hepato-splenomegaly,   EXTREMITIES:  no cyanosis,clubbing or edema  SKIN:  No rash/erythema/ecchymoses/petechiae/wounds/abscess/warm/dry  NEURO:  Alert, oriented    Vital Signs:  Vital Signs Last 24 Hrs  T(C): 36.6 (18 Sep 2020 04:40), Max: 37.2 (17 Sep 2020 22:23)  T(F): 97.8 (18 Sep 2020 04:40), Max: 99 (17 Sep 2020 22:23)  HR: 86 (18 Sep 2020 04:40) (71 - 99)  BP: 91/68 (18 Sep 2020 04:40) (91/68 - 119/74)  BP(mean): --  RR: 18 (18 Sep 2020 04:40) (12 - 19)  SpO2: 95% (18 Sep 2020 04:40) (95% - 98%)  Daily Height in cm: 195.58 (17 Sep 2020 12:43)    Daily Weight in k.5 (17 Sep 2020 07:29)    LABS:                        11.9   8.62  )-----------( 241      ( 17 Sep 2020 06:24 )             37.8     09-17    140  |  101  |  28<H>  ----------------------------<  55<L>  3.2<L>   |  26  |  1.49<H>    Ca    9.5      17 Sep 2020 06:23        PT/INR - ( 17 Sep 2020 07:58 )   PT: 13.3 sec;   INR: 1.12 ratio         PTT - ( 17 Sep 2020 07:58 )  PTT:31.8 sec        Imaging:

## 2020-09-18 NOTE — PRE-ANESTHESIA EVALUATION ADULT - NS MD HP INPLANTS MED DEV
Automatic Implantable Cardioverter Defibrillator

## 2020-09-18 NOTE — PROGRESS NOTE ADULT - SUBJECTIVE AND OBJECTIVE BOX
INTERVAL HPI/OVERNIGHT EVENTS:     Patient ambulating well, s/p colonoscopy tolerated procedure.   No complaints       T(C): 36.1 (09-18-20 @ 16:00), Max: 36.8 (09-18-20 @ 08:31)  HR: 90 (09-18-20 @ 16:00) (80 - 95)  BP: 128/75 (09-18-20 @ 15:00) (104/68 - 128/75)  RR: 18 (09-18-20 @ 16:00) (16 - 18)  SpO2: 97% (09-18-20 @ 16:00) (90% - 99%)      MEDICATIONS  (STANDING):  aspirin  chewable 81 milliGRAM(s) Oral daily  atorvastatin 80 milliGRAM(s) Oral at bedtime  cefTRIAXone   IVPB 2000 milliGRAM(s) IV Intermittent every 24 hours  chlorhexidine 4% Liquid 1 Application(s) Topical <User Schedule>  dextrose 5%. 1000 milliLiter(s) (50 mL/Hr) IV Continuous <Continuous>  dextrose 50% Injectable 12.5 Gram(s) IV Push once  dextrose 50% Injectable 25 Gram(s) IV Push once  dextrose 50% Injectable 25 Gram(s) IV Push once  influenza   Vaccine 0.5 milliLiter(s) IntraMuscular once  insulin glargine Injectable (LANTUS) 60 Unit(s) SubCutaneous at bedtime  insulin lispro (HumaLOG) corrective regimen sliding scale   SubCutaneous three times a day before meals  insulin lispro (HumaLOG) corrective regimen sliding scale   SubCutaneous at bedtime  insulin lispro Injectable (HumaLOG) 20 Unit(s) SubCutaneous three times a day with meals  loratadine 10 milliGRAM(s) Oral daily  metoprolol succinate ER 50 milliGRAM(s) Oral every 12 hours  pantoprazole    Tablet 40 milliGRAM(s) Oral before breakfast  sacubitril 49 mG/valsartan 51 mG 1 Tablet(s) Oral two times a day  sodium chloride 0.9%. 500 milliLiter(s) (30 mL/Hr) IV Continuous <Continuous>  tamsulosin 0.4 milliGRAM(s) Oral at bedtime  vancomycin  IVPB 1000 milliGRAM(s) IV Intermittent once    MEDICATIONS  (PRN):  acetaminophen   Tablet .. 650 milliGRAM(s) Oral every 6 hours PRN Temp greater or equal to 38C (100.4F), Mild Pain (1 - 3)  ALBUTerol    90 MICROgram(s) HFA Inhaler 2 Puff(s) Inhalation every 12 hours PRN Shortness of Breath and/or Wheezing  dextrose 40% Gel 15 Gram(s) Oral once PRN Blood Glucose LESS THAN 70 milliGRAM(s)/deciliter  glucagon  Injectable 1 milliGRAM(s) IntraMuscular once PRN Glucose LESS THAN 70 milligrams/deciliter        Constitutional: NAD. well-developed; well-groomed; well-nourished;  HEENT: AT/NC, PERRLA; EOMI, MMM, no oropharyngeal lesions, no erythema, no exudates,   Respiratory: CTAB. equal aeration bilaterally. no wheezing, no crackes, no rhonchi.   Cardiovascular: RRR, no M/R/G. no JVD  Gastrointestinal: soft; NT/ND, obese, +BS, no rebounding tenderness / guarding / HSM / mass / ascites.  Extremities: no clubbing; no cyanosis; 1+ LE edema to shins, non-tender to palpation, DP and Radial pulses intact.  Skin: warm and dry; color normal: no rash: no ulcers  Neurological: A&Ox 3; responds to pain; responds to verbal commands; CN nerves grossly intact.              `                                                                  11.0   11.84 )-----------( 225      ( 18 Sep 2020 14:05 )             35.6           LIVER FUNCTIONS - ( 18 Sep 2020 14:05 )  Alb: 3.5 g/dL / Pro: 6.4 g/dL / ALK PHOS: 79 U/L / ALT: 34 U/L / AST: 21 U/L / GGT: x           PT/INR - ( 17 Sep 2020 07:58 )   PT: 13.3 sec;   INR: 1.12 ratio         PTT - ( 17 Sep 2020 07:58 )  PTT:31.8 sec  136|105|26<271  4.5|21|1.61  8.9,--,--  09-18 @ 14:05  137|103|28<205  4.3|22|1.50  9.3,--,--  09-18 @ 07:26  GI PCR Panel, Stool (collected 09 Sep 2020 10:21)  Source: .Stool Feces sarina garcia  Final Report (09 Sep 2020 12:58):    GI PCR Results: NOT detected    *******Please Note:*******    GI panel PCR evaluates for:    Campylobacter, Plesiomonas shigelloides, Salmonella,    Vibrio, Yersinia enterocolitica, Enteroaggregative    Escherichia coli (EAEC), Enteropathogenic E.coli (EPEC),    Enterotoxigenic E. coli (ETEC) lt/st, Shiga-like    toxin-producing E. coli (STEC) stx1/stx2,    Shigella/ Enteroinvasive E. coli (EIEC), Cryptosporidium,    Cyclospora cayetanensis, Entamoeba histolytica,    Giardia lamblia, Adenovirus F 40/41, Astrovirus,    Norovirus GI/GII, Rotavirus A, Sapovirus    Culture - Stool (09.09.20 @ 10:21)    Specimen Source: .Stool Feces  sarina jose    Culture Results:   No enteric pathogens to date: Final culture pending    Culture - Blood (09.09.20 @ 00:57)    Gram Stain:   Growth in aerobic and anaerobic bottles: Gram Positive Cocci in Pairs and  Chains    Specimen Source: .Blood Blood-Peripheral    Culture Results:   Growth in aerobic and anaerobic bottles: Streptococcus gallolyticus  See previous culture [id number]        IMAGING:      < from: CT Chest No Cont (09.09.20 @ 01:27) >  IMPRESSION:  No pneumonia.    Emphysema.    No bowel or obstruction.    Hepatomegaly and diffuse hepatic steatosis.    Splenomegaly.    Atrophic right kidney, with severe hydronephrosis. A 0.7 cm soft tissue density is noted in the interpolar region of the right kidney and is incompletely characterized. Ultrasound may be helpful for further evaluation.    < end of copied text >

## 2020-09-18 NOTE — PROVIDER CONTACT NOTE (OTHER) - ASSESSMENT
Pt is AXOX4, OOB in chair, awake. No SOB. Refusing to keep continuous pulse ox on. Pt educated on risks and benefits. Pt O2 sat 94-98%.
Patient A&Ox4, no s/s of distress. Patient denies pain, chest pain, shortness of breath, dizziness, and blurry vision. T 97.8 HR 86 BP 91/68 RR 18 SpO2 95% on room air.
VSS, denies chest pain, SOB and palpitation, Pt. was ambulating at the time of the event. Pt. asymptomatic.

## 2020-09-18 NOTE — CHART NOTE - NSCHARTNOTEFT_GEN_A_CORE
Padua Prediction Score for VTE Risk within 24hours of admission:    Active malignancy:                                                    [  ] YES +3, [ x ] NO   Previous VTE (Excluding Superficial Vein Thrombosis): [  ] YES +3, [ x ] NO  Reduced mobility:                                                     [  ] YES +3, [  x] NO  Already known thrombophilic condition:                     [  ] YES +3, [ x ] NO  Recent (</=1 month trauma and/or surgery):             [  ] YES +2, [ x ] NO  Elderly age (>/=70):                                                  [  ] YES +1, [ x ] NO  Heart and/or Respiratory Failure:                               [ x ] YES +1, [  ] NO   Acute MI and/or ischemic CVA:                                  [ x ] YES +1, [  ] NO   Acute infection and/or rheumatologic disorder:          [ x ] YES +1, [  ] NO   BMI>/= 30:                                                              [x  ] YES +1, [  ] NO   Ongoing hormonal treatment:                                   [  ] YES +1, [ x ] NO    Total Score: [ 4 ]  points    [  ] Padua Score <  3: Low Risk of VTE         - Chemical Thromboprophylaxis should be considered on case-by-case basis  [x  ] Padua Score >/= 4: High Risk of VTE         - Chemical Thromboprophylaxis is recommended for nonpregnant patients without contraindications (Major bleeding, thrombocytopenia) who are >/=  18 years of age                         VTE Prophylaxis Recommendations:  Mechanical Pneumatic Compression Devices                                [  ]  Yes,  [  ] No, Contraindicated    Chemical VTE Prophylaxis (Heparin/ Lovenox/ Fondaparinux)        [   ] Yes,  [ x ] No              [x ] Contraindicated, because s/p S-ICD implantation               [ ] Already receiving Systemic Anticoagulation

## 2020-09-18 NOTE — PROGRESS NOTE ADULT - SUBJECTIVE AND OBJECTIVE BOX
SEVERIANO MARTINEZ  MRN-[id number]  Patient is a 59y old  Male who presents with a chief complaint of fever, cough, emesis (18 Sep 2020 14:46)    HPI:  59M PMH T2DM, HTN, CAD/MI s/p stents (most recent 2018), HFrEF (10-15%), ICD,  ischemic cardiomyopathy, COPD, HTN, GERD pw fever, n/v, back pain x 1 day. Pt states that he was in his baseline state of health when he began to experience back pain while grocery shopping yesterday. He describes sudden onset left lower back pain, palliated by sitting upright, of sharp quality, without radiation, of 7/10 severity, which completely resolved when presenting to ED. After returning home from shopping he experienced chills but did not measure his temperature. After drinking 1 bottle of water he felt nauseous and had an episode of NBNB emesis, with resolved nausea afterward. He reports one instance of cloudy/dark urine while admitted, but denies dysuria, hematuria, change in frequency or foul odor. In addition he reports cough without sputum production. He denies HA, weakness, confusion, sinus congestion, CP, abd pain, dysuria, hematuria, diarrhea, or myalgias.     ED Course:  VS: febrile 102.9, HR , BP 63/40 to 105/62, RR 17-25, SpO2%  on RA  Labs: significant for WBC 10.28, thrombocytopenia 146, elevated BUN/SCr 32/1.88, hyperglycemic 247, elevated proBNP 1158, lactate wnl  Micro: COVID-19 negative, RVP negative, UA not concerning for infxn  Imaging: CXR: clr lungs (prelim read)  Orders: received po tylenol 975mg, po motrin 600mg, IV azithromycin 500mg, IV ceftriaxone 1000mg. Midodrine 10mg x 1 received and ordered for 10mg q8h. Started on levophed. Received 500 cc bolus of LR and 500 cc bolus of NS. Urine and Blood Cx sent, CT A/P, chest, non contrast ordered.     Pt admitted to MICU for management of hypotension requiring pressor support. While in ICU experienced emesis and diarrhea (without melena/hematochezia) x 1 with SOB. (09 Sep 2020 02:30)      Hospital Course:  9/18 transferred to CICU for continued monitoring and weaning of pressors    24 HOUR EVENTS:    REVIEW OF SYSTEMS:   Constitutional: No weakness, fevers, or chills  Eyes/ENT: No visual changes  Respiratory: No cough, wheezing, hemoptysis  Cardiovascular: No chest pain, no palpitations  Gastrointestinal: No abdominal pain. No nausea, vomiting, hematemesis.   Genitourinary: No dysuria  Neurological: No numbness, no weakness  Skin: No itching, rashes    ICU Vital Signs Last 24 Hrs  T(C): 36.7 (18 Sep 2020 20:00), Max: 37.2 (18 Sep 2020 03:56)  T(F): 98.1 (18 Sep 2020 20:00), Max: 99 (18 Sep 2020 03:56)  HR: 94 (18 Sep 2020 22:00) (80 - 100)  BP: 128/75 (18 Sep 2020 15:00) (91/68 - 128/75)  BP(mean): 83 (18 Sep 2020 15:00) (77 - 84)  ABP: 114/50 (18 Sep 2020 22:00) (108/54 - 132/47)  ABP(mean): 68 (18 Sep 2020 22:00) (60 - 80)  RR: 19 (18 Sep 2020 22:00) (16 - 23)  SpO2: 95% (18 Sep 2020 22:00) (90% - 99%)      I&O's Summary    17 Sep 2020 07:  -  18 Sep 2020 07:00  --------------------------------------------------------  IN: 620 mL / OUT: 400 mL / NET: 220 mL    18 Sep 2020 07:  -  18 Sep 2020 23:30  --------------------------------------------------------  IN: 420 mL / OUT: 625 mL / NET: -205 mL      POCT Blood Glucose.: 325 mg/dL (18 Sep 2020 21:25)      PHYSICAL EXAM:   General: No acute distress  Eyes: EOMI, PERRLA, conjunctiva and sclera clear  Chest/Lung: CTAB, no wheezes, rales, or rhonchi  Heart: Regular rate, regular rhythm. Normal S1/S2. No murmurs, rubs, or gallops.  Abdomen: Soft, nontender, nondistended. Normal bowel sounds.  Extremites: 2+ peripheral pulses B/L. No clubbing, cyanosis, or edema.  Neurology: A&O x3, no focal deficits  Skin: No rashes or lesions    ============================I/O===========================   I&O's Detail    17 Sep 2020 07:01  -  18 Sep 2020 07:00  --------------------------------------------------------  IN:    Oral Fluid: 620 mL  Total IN: 620 mL    OUT:    Voided (mL): 400 mL  Total OUT: 400 mL    Total NET: 220 mL      18 Sep 2020 07:01  -  18 Sep 2020 23:30  --------------------------------------------------------  IN:    Oral Fluid: 420 mL  Total IN: 420 mL    OUT:    Voided (mL): 625 mL  Total OUT: 625 mL    Total NET: -205 mL        ============================ LABS =========================                        11.0   11.84 )-----------( 225      ( 18 Sep 2020 14:05 )             35.6     09-18    136  |  105  |  26<H>  ----------------------------<  271<H>  4.5   |  21<L>  |  1.61<H>    Ca    8.9      18 Sep 2020 14:05    TPro  6.4  /  Alb  3.5  /  TBili  0.4  /  DBili  x   /  AST  21  /  ALT  34  /  AlkPhos  79  09-18                LIVER FUNCTIONS - ( 18 Sep 2020 14:05 )  Alb: 3.5 g/dL / Pro: 6.4 g/dL / ALK PHOS: 79 U/L / ALT: 34 U/L / AST: 21 U/L / GGT: x           PT/INR - ( 17 Sep 2020 07:58 )   PT: 13.3 sec;   INR: 1.12 ratio         PTT - ( 17 Sep 2020 07:58 )  PTT:31.8 sec  ABG - ( 18 Sep 2020 13:55 )  pH, Arterial: 7.40  pH, Blood: x     /  pCO2: 39    /  pO2: 67    / HCO3: 24    / Base Excess: -.4   /  SaO2: 89                Blood Gas Arterial, Lactate: 0.5 mmol/L (20 @ 13:55)      ======================Micro/Rad/Cardio=================  Telemetry: Reviewed   EKG: Reviewed  CXR: Reviewed  Culture: Reviewed   Echo: Reviewed  Cath: Reviewed  ======================================================  PAST MEDICAL & SURGICAL HISTORY:  H/O gastroesophageal reflux (GERD)    History of COPD    History of ischemic cardiomyopathy    Heart failure with reduced ejection fraction    Hypertension    AICD (automatic cardioverter/defibrillator) present    Diabetes    Stented coronary artery    H/O vasectomy  20 yrs ago ()      ====================ASSESSMENT ==============  CAD/MI s/p stents (most recent 2018)  Hx of HFrEF (10-15%)  S/p ICD replacement procedure, had to be externally defibrillated as ICD initially not firing, wires adjusted and now working  DMT2   Septic Shock- GPC on BCx initially; cleared since on antibiotics     Plan:  ====================== NEUROLOGY=====================  Nonfocal, continue to monitor neuro status per ICU protocol.   - Tylenol prn for fevers    Physical therapy  - C/w OOBTC as tolerated    acetaminophen   Tablet .. 650 milliGRAM(s) Oral every 6 hours PRN Temp greater or equal to 38C (100.4F), Mild Pain (1 - 3)    ==================== RESPIRATORY======================  Comfortable on RA, SpO2 95%.  - Cont to monitor SpO2 via pulse oximetry, encourage incentive spirometry   - C/w bronchodilators    ALBUTerol    90 MICROgram(s) HFA Inhaler 2 Puff(s) Inhalation every 12 hours PRN Shortness of Breath and/or Wheezing  loratadine 10 milliGRAM(s) Oral daily    ====================CARDIOVASCULAR==================  HFrEF with AICD s/p ICD replacement requiring /oscar after procedure  - Off  & Oscar before coming to CICU  - Metoprolol succinate 50mg PO BID and Entresto for Blood pressure support   - ASA/ statin for CAD      metoprolol succinate ER 50 milliGRAM(s) Oral every 12 hours  sacubitril 49 mG/valsartan 51 mG 1 Tablet(s) Oral two times a day  aspirin  chewable 81 milliGRAM(s) Oral daily  atorvastatin 80 milliGRAM(s) Oral at bedtime    ===================HEMATOLOGIC/ONC ===================  H/H .6, stable.   - Continue to monitor hemoglobin and hematocrit levels.     ===================== RENAL =========================  CKD (1.4-1.8 during 2019 admission)  - Continue to monitor I/Os, BUN/Creatinine, and urine output.   - Cr 1.61 after procedure today  BPH   - continue tamsulosin  -  Replete lytes PRN. Keep K> 4 and Mg >2.     tamsulosin 0.4 milliGRAM(s) Oral at bedtime    ==================== GASTROINTESTINAL===================  Continue Protonix for stress ulcer prophylaxis.     dextrose 5%. 1000 milliLiter(s) (50 mL/Hr) IV Continuous <Continuous>  pantoprazole    Tablet 40 milliGRAM(s) Oral before breakfast  sodium chloride 0.9%. 500 milliLiter(s) (30 mL/Hr) IV Continuous <Continuous>    =======================    ENDOCRINE  =====================  IDDM- continue HUmalog premeal and qhs, Humalog sliding scale,  LANTUS 60U qhs, glucagon prn  -Monitor blood glucose levels    dextrose 40% Gel 15 Gram(s) Oral once PRN Blood Glucose LESS THAN 70 milliGRAM(s)/deciliter  dextrose 50% Injectable 12.5 Gram(s) IV Push once  dextrose 50% Injectable 25 Gram(s) IV Push once  dextrose 50% Injectable 25 Gram(s) IV Push once  glucagon  Injectable 1 milliGRAM(s) IntraMuscular once PRN Glucose LESS THAN 70 milligrams/deciliter  insulin glargine Injectable (LANTUS) 60 Unit(s) SubCutaneous at bedtime  insulin lispro (HumaLOG) corrective regimen sliding scale   SubCutaneous three times a day before meals  insulin lispro (HumaLOG) corrective regimen sliding scale   SubCutaneous at bedtime  insulin lispro Injectable (HumaLOG) 20 Unit(s) SubCutaneous three times a day with meals    ========================INFECTIOUS DISEASE================  Septic Shock growing GPC's on Bcx, since cleared Bcx's  - c/w antibiotics for procedural prophylaxis   - recent BCx NGTD    cefTRIAXone   IVPB 2000 milliGRAM(s) IV Intermittent every 24 hours  vancomycin  IVPB 1000 milliGRAM(s) IV Intermittent once      Patient requires continuous monitoring with bedside rhythm monitoring, pulse ox monitoring, and intermittent blood gas analysis. Care plan discussed with ICU care team. Patient remained critical and at risk for life threatening decompensation.  Patient seen, examined and plan discussed with CCU team during rounds.     I have personally provided 35 minutes of critical care time excluding time spent on separate procedures.    By signing my name below, I, Raine Valencia, attest that this documentation has been prepared under the direction and in the presence of Dawn Mckeon NP.  Electronically signed: Buddy Santos, 20 @ 23:30    I, Dawn Mckeon , personally performed the services described in this documentation. all medical record entries made by the morenitaibe were at my direction and in my presence. I have reviewed the chart and agree that the record reflects my personal performance and is accurate and complete  Electronically signed: Dawn Mckeon NP.       SEVERIANO MARTINEZ  MRN-[id number]  Patient is a 59y old  Male who presents with a chief complaint of fever, cough, emesis (18 Sep 2020 14:46)    HPI:  59M PMH T2DM, HTN, CAD/MI s/p stents (most recent 2018), HFrEF (10-15%), ICD,  ischemic cardiomyopathy, COPD, HTN, GERD pw fever, n/v, back pain x 1 day. Pt states that he was in his baseline state of health when he began to experience back pain while grocery shopping yesterday. He describes sudden onset left lower back pain, palliated by sitting upright, of sharp quality, without radiation, of 7/10 severity, which completely resolved when presenting to ED. After returning home from shopping he experienced chills but did not measure his temperature. After drinking 1 bottle of water he felt nauseous and had an episode of NBNB emesis, with resolved nausea afterward. He reports one instance of cloudy/dark urine while admitted, but denies dysuria, hematuria, change in frequency or foul odor. In addition he reports cough without sputum production. He denies HA, weakness, confusion, sinus congestion, CP, abd pain, dysuria, hematuria, diarrhea, or myalgias.     ED Course:  VS: febrile 102.9, HR , BP 63/40 to 105/62, RR 17-25, SpO2%  on RA  Labs: significant for WBC 10.28, thrombocytopenia 146, elevated BUN/SCr 32/1.88, hyperglycemic 247, elevated proBNP 1158, lactate wnl  Micro: COVID-19 negative, RVP negative, UA not concerning for infxn  Imaging: CXR: clr lungs (prelim read)  Orders: received po tylenol 975mg, po motrin 600mg, IV azithromycin 500mg, IV ceftriaxone 1000mg. Midodrine 10mg x 1 received and ordered for 10mg q8h. Started on levophed. Received 500 cc bolus of LR and 500 cc bolus of NS. Urine and Blood Cx sent, CT A/P, chest, non contrast ordered.     Pt admitted to MICU for management of hypotension requiring pressor support. While in ICU experienced emesis and diarrhea (without melena/hematochezia) x 1 with SOB. (09 Sep 2020 02:30)      Hospital Course:  9/18 transferred to CICU for continued monitoring and weaning of pressors    24 HOUR EVENTS:  - s/p s-ICD placement   - oscar and  weaned off    REVIEW OF SYSTEMS:   Constitutional: No weakness, fevers, or chills  Eyes/ENT: No visual changes  Respiratory: No cough, wheezing, hemoptysis  Cardiovascular: No chest pain, no palpitations  Gastrointestinal: No abdominal pain. No nausea, vomiting, hematemesis.   Genitourinary: No dysuria  Neurological: No numbness, no weakness  Skin: No itching, rashes    ICU Vital Signs Last 24 Hrs  T(C): 36.7 (18 Sep 2020 20:00), Max: 37.2 (18 Sep 2020 03:56)  T(F): 98.1 (18 Sep 2020 20:00), Max: 99 (18 Sep 2020 03:56)  HR: 94 (18 Sep 2020 22:00) (80 - 100)  BP: 128/75 (18 Sep 2020 15:00) (91/68 - 128/75)  BP(mean): 83 (18 Sep 2020 15:00) (77 - 84)  ABP: 114/50 (18 Sep 2020 22:00) (108/54 - 132/47)  ABP(mean): 68 (18 Sep 2020 22:00) (60 - 80)  RR: 19 (18 Sep 2020 22:00) (16 - 23)  SpO2: 95% (18 Sep 2020 22:00) (90% - 99%)      I&O's Summary    17 Sep 2020 07:  -  18 Sep 2020 07:00  --------------------------------------------------------  IN: 620 mL / OUT: 400 mL / NET: 220 mL    18 Sep 2020 07:  -  18 Sep 2020 23:30  --------------------------------------------------------  IN: 420 mL / OUT: 625 mL / NET: -205 mL      POCT Blood Glucose.: 325 mg/dL (18 Sep 2020 21:25)      PHYSICAL EXAM:   General: No acute distress  Eyes: EOMI, PERRLA, conjunctiva and sclera clear  Chest/Lung: CTAB, no wheezes, rales, or rhonchi  Heart: Regular rate, regular rhythm. Normal S1/S2. No murmurs, rubs, or gallops.  Abdomen: Soft, nontender, nondistended. Normal bowel sounds.  Extremites: 2+ peripheral pulses B/L. No clubbing, cyanosis, or edema.  Neurology: A&O x3, no focal deficits  Skin: No rashes or lesions    ============================I/O===========================   I&O's Detail    17 Sep 2020 07:01  -  18 Sep 2020 07:00  --------------------------------------------------------  IN:    Oral Fluid: 620 mL  Total IN: 620 mL    OUT:    Voided (mL): 400 mL  Total OUT: 400 mL    Total NET: 220 mL      18 Sep 2020 07:  -  18 Sep 2020 23:30  --------------------------------------------------------  IN:    Oral Fluid: 420 mL  Total IN: 420 mL    OUT:    Voided (mL): 625 mL  Total OUT: 625 mL    Total NET: -205 mL        ============================ LABS =========================                        11.0   11.84 )-----------( 225      ( 18 Sep 2020 14:05 )             35.6     09-18    136  |  105  |  26<H>  ----------------------------<  271<H>  4.5   |  21<L>  |  1.61<H>    Ca    8.9      18 Sep 2020 14:05    TPro  6.4  /  Alb  3.5  /  TBili  0.4  /  DBili  x   /  AST  21  /  ALT  34  /  AlkPhos  79  09-18                LIVER FUNCTIONS - ( 18 Sep 2020 14:05 )  Alb: 3.5 g/dL / Pro: 6.4 g/dL / ALK PHOS: 79 U/L / ALT: 34 U/L / AST: 21 U/L / GGT: x           PT/INR - ( 17 Sep 2020 07:58 )   PT: 13.3 sec;   INR: 1.12 ratio         PTT - ( 17 Sep 2020 07:58 )  PTT:31.8 sec  ABG - ( 18 Sep 2020 13:55 )  pH, Arterial: 7.40  pH, Blood: x     /  pCO2: 39    /  pO2: 67    / HCO3: 24    / Base Excess: -.4   /  SaO2: 89                Blood Gas Arterial, Lactate: 0.5 mmol/L (20 @ 13:55)      ======================Micro/Rad/Cardio=================  Telemetry: Reviewed   EKG: Reviewed  CXR: Reviewed  Culture: Reviewed   Echo: Reviewed  Cath: Reviewed  ======================================================  PAST MEDICAL & SURGICAL HISTORY:  H/O gastroesophageal reflux (GERD)    History of COPD    History of ischemic cardiomyopathy    Heart failure with reduced ejection fraction    Hypertension    AICD (automatic cardioverter/defibrillator) present    Diabetes    Stented coronary artery    H/O vasectomy  20 yrs ago ()      ====================ASSESSMENT ==============  CAD/MI s/p stents (most recent 2018)  Hx of HFrEF (10-15%)  S/p ICD replacement procedure, had to be externally defibrillated as ICD initially not firing, wires adjusted and now working  DMT2   Septic Shock- GPC on BCx initially; cleared since on antibiotics     Plan:  ====================== NEUROLOGY=====================  Nonfocal, continue to monitor neuro status per ICU protocol.   - Tylenol prn for fevers    Physical therapy  - C/w OOBTC as tolerated    acetaminophen   Tablet .. 650 milliGRAM(s) Oral every 6 hours PRN Temp greater or equal to 38C (100.4F), Mild Pain (1 - 3)    ==================== RESPIRATORY======================  Comfortable on RA, SpO2 95%.  - Cont to monitor SpO2 via pulse oximetry, encourage incentive spirometry   - C/w bronchodilators    ALBUTerol    90 MICROgram(s) HFA Inhaler 2 Puff(s) Inhalation every 12 hours PRN Shortness of Breath and/or Wheezing  loratadine 10 milliGRAM(s) Oral daily    ====================CARDIOVASCULAR==================  HFrEF with AICD s/p ICD replacement requiring /oscar after procedure  - Off  & Oscar before coming to CICU (off since 400)   - Metoprolol succinate 50mg PO BID and Entresto for Blood pressure support   - ASA/ statin for CAD      metoprolol succinate ER 50 milliGRAM(s) Oral every 12 hours  sacubitril 49 mG/valsartan 51 mG 1 Tablet(s) Oral two times a day  aspirin  chewable 81 milliGRAM(s) Oral daily  atorvastatin 80 milliGRAM(s) Oral at bedtime    ===================HEMATOLOGIC/ONC ===================  H/H .6, stable.   - Continue to monitor hemoglobin and hematocrit levels.     ===================== RENAL =========================  CKD (1.4-1.8 during 2019 admission)  - Continue to monitor I/Os, BUN/Creatinine, and urine output.   - Cr 1.61 after procedure today    BPH   - continue tamsulosin  -  Replete lytes PRN. Keep K> 4 and Mg >2.     tamsulosin 0.4 milliGRAM(s) Oral at bedtime    ==================== GASTROINTESTINAL===================  Continue Protonix for stress ulcer prophylaxis.     dextrose 5%. 1000 milliLiter(s) (50 mL/Hr) IV Continuous <Continuous>  pantoprazole    Tablet 40 milliGRAM(s) Oral before breakfast  sodium chloride 0.9%. 500 milliLiter(s) (30 mL/Hr) IV Continuous <Continuous>    =======================    ENDOCRINE  =====================  IDDM- continue HUmalog premeal and qhs, Humalog sliding scale,  LANTUS 60U qhs, glucagon prn  -Monitor blood glucose levels    dextrose 40% Gel 15 Gram(s) Oral once PRN Blood Glucose LESS THAN 70 milliGRAM(s)/deciliter  dextrose 50% Injectable 12.5 Gram(s) IV Push once  dextrose 50% Injectable 25 Gram(s) IV Push once  dextrose 50% Injectable 25 Gram(s) IV Push once  glucagon  Injectable 1 milliGRAM(s) IntraMuscular once PRN Glucose LESS THAN 70 milligrams/deciliter  insulin glargine Injectable (LANTUS) 60 Unit(s) SubCutaneous at bedtime  insulin lispro (HumaLOG) corrective regimen sliding scale   SubCutaneous three times a day before meals  insulin lispro (HumaLOG) corrective regimen sliding scale   SubCutaneous at bedtime  insulin lispro Injectable (HumaLOG) 20 Unit(s) SubCutaneous three times a day with meals    ========================INFECTIOUS DISEASE================  Septic Shock growing GPC's on Bcx, since cleared Bcx's  - c/w antibiotics for procedural prophylaxis   - recent BCx NGTD    cefTRIAXone   IVPB 2000 milliGRAM(s) IV Intermittent every 24 hours  vancomycin  IVPB 1000 milliGRAM(s) IV Intermittent once      Patient requires continuous monitoring with bedside rhythm monitoring, pulse ox monitoring, and intermittent blood gas analysis. Care plan discussed with ICU care team. Patient remained critical and at risk for life threatening decompensation.  Patient seen, examined and plan discussed with CCU team during rounds.     I have personally provided 35 minutes of critical care time excluding time spent on separate procedures.    By signing my name below, I, Raine Valencia, attest that this documentation has been prepared under the direction and in the presence of Dawn Mckeon NP.  Electronically signed: Buddy Santos, 20 @ 23:30    I, Dawn Mckeon , personally performed the services described in this documentation. all medical record entries made by the morenitaibjudi were at my direction and in my presence. I have reviewed the chart and agree that the record reflects my personal performance and is accurate and complete  Electronically signed: Dawn Mckeon NP.       SEVERIANO MARTINEZ  MRN-[id number]  Patient is a 59y old  Male who presents with a chief complaint of fever, cough, emesis (18 Sep 2020 14:46)    HPI:  59M PMH T2DM, HTN, CAD/MI s/p stents (most recent 2018), HFrEF (10-15%), ICD,  ischemic cardiomyopathy, COPD, HTN, GERD pw fever, n/v, back pain x 1 day. Pt states that he was in his baseline state of health when he began to experience back pain while grocery shopping yesterday. He describes sudden onset left lower back pain, palliated by sitting upright, of sharp quality, without radiation, of 7/10 severity, which completely resolved when presenting to ED. After returning home from shopping he experienced chills but did not measure his temperature. After drinking 1 bottle of water he felt nauseous and had an episode of NBNB emesis, with resolved nausea afterward. He reports one instance of cloudy/dark urine while admitted, but denies dysuria, hematuria, change in frequency or foul odor. In addition he reports cough without sputum production. He denies HA, weakness, confusion, sinus congestion, CP, abd pain, dysuria, hematuria, diarrhea, or myalgias.     ED Course:  VS: febrile 102.9, HR , BP 63/40 to 105/62, RR 17-25, SpO2%  on RA  Labs: significant for WBC 10.28, thrombocytopenia 146, elevated BUN/SCr 32/1.88, hyperglycemic 247, elevated proBNP 1158, lactate wnl  Micro: COVID-19 negative, RVP negative, UA not concerning for infxn  Imaging: CXR: clr lungs (prelim read)  Orders: received po tylenol 975mg, po motrin 600mg, IV azithromycin 500mg, IV ceftriaxone 1000mg. Midodrine 10mg x 1 received and ordered for 10mg q8h. Started on levophed. Received 500 cc bolus of LR and 500 cc bolus of NS. Urine and Blood Cx sent, CT A/P, chest, non contrast ordered.     Pt admitted to MICU for management of hypotension requiring pressor support. While in ICU experienced emesis and diarrhea (without melena/hematochezia) x 1 with SOB. (09 Sep 2020 02:30)      Hospital Course:  9/18 transferred to CICU for continued monitoring and weaning of pressors    24 HOUR EVENTS:  - s/p s-ICD placement   - oscar and  weaned off  -s/p  lasix 40 mg IVP    REVIEW OF SYSTEMS:   Constitutional: No weakness, fevers, or chills  Eyes/ENT: No visual changes  Respiratory: No cough, wheezing, hemoptysis  Cardiovascular: No chest pain, no palpitations  Gastrointestinal: No abdominal pain. No nausea, vomiting, hematemesis.   Genitourinary: No dysuria  Neurological: No numbness, no weakness  Skin: No itching, rashes    ICU Vital Signs Last 24 Hrs  T(C): 36.7 (18 Sep 2020 20:00), Max: 37.2 (18 Sep 2020 03:56)  T(F): 98.1 (18 Sep 2020 20:00), Max: 99 (18 Sep 2020 03:56)  HR: 94 (18 Sep 2020 22:00) (80 - 100)  BP: 128/75 (18 Sep 2020 15:00) (91/68 - 128/75)  BP(mean): 83 (18 Sep 2020 15:00) (77 - 84)  ABP: 114/50 (18 Sep 2020 22:00) (108/54 - 132/47)  ABP(mean): 68 (18 Sep 2020 22:00) (60 - 80)  RR: 19 (18 Sep 2020 22:00) (16 - 23)  SpO2: 95% (18 Sep 2020 22:00) (90% - 99%)      I&O's Summary    17 Sep 2020 07:01  -  18 Sep 2020 07:00  --------------------------------------------------------  IN: 620 mL / OUT: 400 mL / NET: 220 mL    18 Sep 2020 07:  -  18 Sep 2020 23:30  --------------------------------------------------------  IN: 420 mL / OUT: 625 mL / NET: -205 mL      POCT Blood Glucose.: 325 mg/dL (18 Sep 2020 21:25)      PHYSICAL EXAM:   General: No acute distress  Eyes: EOMI, PERRLA, conjunctiva and sclera clear  Chest/Lung: CTAB, no wheezes, rales, or rhonchi  Heart: Regular rate, regular rhythm. Normal S1/S2. No murmurs, rubs, or gallops.  Abdomen: Soft, nontender, nondistended. Normal bowel sounds.  Extremites: 2+ peripheral pulses B/L. No clubbing, cyanosis, or edema.  Neurology: A&O x3, no focal deficits  Skin: No rashes or lesions    ============================I/O===========================   I&O's Detail    17 Sep 2020 07:01  -  18 Sep 2020 07:00  --------------------------------------------------------  IN:    Oral Fluid: 620 mL  Total IN: 620 mL    OUT:    Voided (mL): 400 mL  Total OUT: 400 mL    Total NET: 220 mL      18 Sep 2020 07:01  -  18 Sep 2020 23:30  --------------------------------------------------------  IN:    Oral Fluid: 420 mL  Total IN: 420 mL    OUT:    Voided (mL): 625 mL  Total OUT: 625 mL    Total NET: -205 mL        ============================ LABS =========================                        11.0   11.84 )-----------( 225      ( 18 Sep 2020 14:05 )             35.6     09-18    136  |  105  |  26<H>  ----------------------------<  271<H>  4.5   |  21<L>  |  1.61<H>    Ca    8.9      18 Sep 2020 14:05    TPro  6.4  /  Alb  3.5  /  TBili  0.4  /  DBili  x   /  AST  21  /  ALT  34  /  AlkPhos  79  09-18                LIVER FUNCTIONS - ( 18 Sep 2020 14:05 )  Alb: 3.5 g/dL / Pro: 6.4 g/dL / ALK PHOS: 79 U/L / ALT: 34 U/L / AST: 21 U/L / GGT: x           PT/INR - ( 17 Sep 2020 07:58 )   PT: 13.3 sec;   INR: 1.12 ratio         PTT - ( 17 Sep 2020 07:58 )  PTT:31.8 sec  ABG - ( 18 Sep 2020 13:55 )  pH, Arterial: 7.40  pH, Blood: x     /  pCO2: 39    /  pO2: 67    / HCO3: 24    / Base Excess: -.4   /  SaO2: 89                Blood Gas Arterial, Lactate: 0.5 mmol/L (20 @ 13:55)      ======================Micro/Rad/Cardio=================  Telemetry: Reviewed   EKG: Reviewed  CXR: Reviewed  Culture: Reviewed   Echo: Reviewed  Cath: Reviewed  ======================================================  PAST MEDICAL & SURGICAL HISTORY:  H/O gastroesophageal reflux (GERD)    History of COPD    History of ischemic cardiomyopathy    Heart failure with reduced ejection fraction    Hypertension    AICD (automatic cardioverter/defibrillator) present    Diabetes    Stented coronary artery    H/O vasectomy  20 yrs ago ()      ====================ASSESSMENT ==============  CAD/MI s/p stents (most recent 2018)  Hx of HFrEF (10-15%)  S/p ICD replacement procedure, had to be externally defibrillated as ICD initially not firing, wires adjusted and now working  DMT2   Septic Shock- GPC on BCx initially; cleared since on antibiotics     Plan:  ====================== NEUROLOGY=====================  Nonfocal, continue to monitor neuro status per ICU protocol.   - Tylenol prn for fevers    Physical therapy  - C/w OOBTC as tolerated    acetaminophen   Tablet .. 650 milliGRAM(s) Oral every 6 hours PRN Temp greater or equal to 38C (100.4F), Mild Pain (1 - 3)    ==================== RESPIRATORY======================  Comfortable on RA, SpO2 95%.  - Cont to monitor SpO2 via pulse oximetry, encourage incentive spirometry   - C/w bronchodilators    ALBUTerol    90 MICROgram(s) HFA Inhaler 2 Puff(s) Inhalation every 12 hours PRN Shortness of Breath and/or Wheezing  loratadine 10 milliGRAM(s) Oral daily    ====================CARDIOVASCULAR==================  HFrEF with AICD s/p ICD replacement requiring /oscar after procedure  - Off  & Oscar before coming to CICU (off since 1600)   - Metoprolol succinate 50mg PO BID and Entresto for Blood pressure support   - ASA/ statin for CAD  - s/p lasix 40 mg IVP       metoprolol succinate ER 50 milliGRAM(s) Oral every 12 hours  sacubitril 49 mG/valsartan 51 mG 1 Tablet(s) Oral two times a day  aspirin  chewable 81 milliGRAM(s) Oral daily  atorvastatin 80 milliGRAM(s) Oral at bedtime    ===================HEMATOLOGIC/ONC ===================  H/H 11/35.6, stable.   - Continue to monitor hemoglobin and hematocrit levels.     ===================== RENAL =========================  CKD (1.4-1.8 during 2019 admission)  - Continue to monitor I/Os, BUN/Creatinine, and urine output.   - Cr 1.61 after procedure today    BPH   - continue tamsulosin  -  Replete lytes PRN. Keep K> 4 and Mg >2.     tamsulosin 0.4 milliGRAM(s) Oral at bedtime    ==================== GASTROINTESTINAL===================  Continue Protonix for stress ulcer prophylaxis.     dextrose 5%. 1000 milliLiter(s) (50 mL/Hr) IV Continuous <Continuous>  pantoprazole    Tablet 40 milliGRAM(s) Oral before breakfast  sodium chloride 0.9%. 500 milliLiter(s) (30 mL/Hr) IV Continuous <Continuous>    =======================    ENDOCRINE  =====================  IDDM- continue HUmalog premeal and qhs, Humalog sliding scale,  LANTUS 60U qhs, glucagon prn  -Monitor blood glucose levels    dextrose 40% Gel 15 Gram(s) Oral once PRN Blood Glucose LESS THAN 70 milliGRAM(s)/deciliter  dextrose 50% Injectable 12.5 Gram(s) IV Push once  dextrose 50% Injectable 25 Gram(s) IV Push once  dextrose 50% Injectable 25 Gram(s) IV Push once  glucagon  Injectable 1 milliGRAM(s) IntraMuscular once PRN Glucose LESS THAN 70 milligrams/deciliter  insulin glargine Injectable (LANTUS) 60 Unit(s) SubCutaneous at bedtime  insulin lispro (HumaLOG) corrective regimen sliding scale   SubCutaneous three times a day before meals  insulin lispro (HumaLOG) corrective regimen sliding scale   SubCutaneous at bedtime  insulin lispro Injectable (HumaLOG) 20 Unit(s) SubCutaneous three times a day with meals    ========================INFECTIOUS DISEASE================  Septic Shock growing GPC's on Bcx, since cleared Bcx's  - c/w antibiotics for procedural prophylaxis   - recent BCx NGTD  - c/w vanc/ zosyn   - need PICC eval for home abx infusion     cefTRIAXone   IVPB 2000 milliGRAM(s) IV Intermittent every 24 hours  vancomycin  IVPB 1000 milliGRAM(s) IV Intermittent once      Patient requires continuous monitoring with bedside rhythm monitoring, pulse ox monitoring, and intermittent blood gas analysis. Care plan discussed with ICU care team. Patient remained critical and at risk for life threatening decompensation.  Patient seen, examined and plan discussed with CCU team during rounds.     I have personally provided 35 minutes of critical care time excluding time spent on separate procedures.    By signing my name below, I, Raine Valencia, attest that this documentation has been prepared under the direction and in the presence of Dawn Mckeon NP.  Electronically signed: Buddy Santos, 20 @ 23:30    I, Dawn Mckeon , personally performed the services described in this documentation. all medical record entries made by the scribe were at my direction and in my presence. I have reviewed the chart and agree that the record reflects my personal performance and is accurate and complete  Electronically signed: Dawn Mckeon NP.

## 2020-09-18 NOTE — PROGRESS NOTE ADULT - ASSESSMENT
ASSESSMENT/RECOMMENDATIONS:  59M PMH T2DM(a1c=11.2%), HTN, CAD/MI s/p stents (most recent 2/2018), HFrEF (10-15%), ICD, ischemic cardiomyopathy, COPD, HTN, GERD pw fever, n/v, back pain x 1 day. Pt states that he was in his baseline state of health when he began to experience back pain while grocery shopping yesterday. He describes sudden onset left lower back pain, palliated by sitting upright, of sharp quality, without radiation, of 7/10 severity, which completely resolved when presenting to ED.     VS: febrile 102.9, HR , BP 63/40 to 105/62, RR 17-25, SpO2%  on RA  Labs: significant for WBC 10.28, thrombocytopenia 146, elevated BUN/SCr 32/1.88, hyperglycemic 247, elevated proBNP 1158, lactate wnl  Micro: COVID-19 negative, RVP negative, UA 0 WBC and negative for bacteria. GI PCR negative. BCx: Strep by PCR.  CT: No pneumonia. Emphysema. No bowel or obstruction. Hepatomegaly and diffuse hepatic steatosis. Splenomegaly. Atrophic right kidney, with severe hydronephrosis. A 0.7 cm soft tissue density is noted in the interpolar region of the right kidney and is incompletely characterized. Ultrasound may be helpful for further evaluation.  Pt admitted to MICU for management of hypotension requiring pressor support. While in ICU experienced emesis and diarrhea (without melena/hematochezia) x 1 with SOB.     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    Overall bacteremia, strep infection.       Plan:   continue Rocephin at current dose.   s/p colonoscopy  MRI with possible early Osteomyelitis, no extension into epidural space, reviewed personally with neuroradiology, therefore will likely need a 6 week course of INTRAVENOUS  abs - day 10 of 42 of abx   follow up blood cultures so far NGTD  ICD lead cx NTD   Cr  remains elevated   follow ESR and CRP  s/p ICD  Ceftriaxone 2 gms IVSS daily   weekly cbc, cmp, ESR, CRP

## 2020-09-18 NOTE — PROGRESS NOTE ADULT - ASSESSMENT
Assessment  DMT2:  59y Male with DM T2 with hyperglycemia, A1C 11.2%, on basal bolus insulin, decreased dose, patient received partial dose Lantus 2/2 NPO status, blood sugars running high and not at target, no hypoglycemic episodes. Patient is s/p colonoscopy yesterday, had multiple polyps removed, remains NPO.  Septic Shock: Hydronephrosis, On IV ABx, stable, monitored.  Obesity: No strict exercise routines, not on any weight loss program, neither on low calorie diet.            Cortney Flanagan MD  Cell: [phone]  Office: [phone]               Assessment  DMT2:  59y Male with DM T2 with hyperglycemia, A1C 11.2%, on basal bolus insulin, decreased dose, patient received partial dose Lantus 2/2 NPO status, blood sugars running high and not at target, no hypoglycemic episodes. Patient is s/p colonoscopy yesterday,  had multiple polyps removed, remains NPO.  Septic Shock: Hydronephrosis, On IV ABx, stable, monitored.  Obesity: No strict exercise routines, not on any weight loss program, neither on low calorie diet.            Cortney Flanagan MD  Cell: [phone]  Office: [phone]

## 2020-09-18 NOTE — PROGRESS NOTE ADULT - SUBJECTIVE AND OBJECTIVE BOX
Chief complaint  Patient is a 59y old  Male who presents with a chief complaint of fever, cough, emesis (18 Sep 2020 07:07)   Review of systems  Patient in bed, looks comfortable, no hypoglycemic episodes.    Labs and Fingersticks  CAPILLARY BLOOD GLUCOSE      POCT Blood Glucose.: 260 mg/dL (18 Sep 2020 13:14)  POCT Blood Glucose.: 196 mg/dL (18 Sep 2020 08:18)  POCT Blood Glucose.: 215 mg/dL (18 Sep 2020 04:37)  POCT Blood Glucose.: 281 mg/dL (17 Sep 2020 22:04)  POCT Blood Glucose.: 149 mg/dL (17 Sep 2020 17:42)  POCT Blood Glucose.: 108 mg/dL (17 Sep 2020 16:29)      Anion Gap, Serum: 12 (09-18 @ 07:26)  Anion Gap, Serum: 13 (09-17 @ 06:23)      Calcium, Total Serum: 9.3 (09-18 @ 07:26)  Calcium, Total Serum: 9.5 (09-17 @ 06:23)          09-18    137  |  103  |  28<H>  ----------------------------<  205<H>  4.3   |  22  |  1.50<H>    Ca    9.3      18 Sep 2020 07:26                          11.8   8.54  )-----------( 239      ( 18 Sep 2020 07:26 )             37.5     Medications  MEDICATIONS  (STANDING):  aspirin  chewable 81 milliGRAM(s) Oral daily  atorvastatin 80 milliGRAM(s) Oral at bedtime  cefTRIAXone   IVPB 2000 milliGRAM(s) IV Intermittent every 24 hours  chlorhexidine 4% Liquid 1 Application(s) Topical <User Schedule>  dextrose 5%. 1000 milliLiter(s) (50 mL/Hr) IV Continuous <Continuous>  dextrose 50% Injectable 12.5 Gram(s) IV Push once  dextrose 50% Injectable 25 Gram(s) IV Push once  dextrose 50% Injectable 25 Gram(s) IV Push once  DOBUTamine Infusion 5 MICROgram(s)/kG/Min (18.7 mL/Hr) IV Continuous <Continuous>  furosemide    Tablet 80 milliGRAM(s) Oral daily  influenza   Vaccine 0.5 milliLiter(s) IntraMuscular once  insulin glargine Injectable (LANTUS) 60 Unit(s) SubCutaneous at bedtime  insulin lispro (HumaLOG) corrective regimen sliding scale   SubCutaneous three times a day before meals  insulin lispro (HumaLOG) corrective regimen sliding scale   SubCutaneous at bedtime  insulin lispro Injectable (HumaLOG) 20 Unit(s) SubCutaneous three times a day with meals  loratadine 10 milliGRAM(s) Oral daily  metoprolol succinate ER 50 milliGRAM(s) Oral every 12 hours  pantoprazole    Tablet 40 milliGRAM(s) Oral before breakfast  phenylephrine    Infusion 0.5 MICROgram(s)/kG/Min (23.4 mL/Hr) IV Continuous <Continuous>  sacubitril 49 mG/valsartan 51 mG 1 Tablet(s) Oral two times a day  sodium chloride 0.9%. 500 milliLiter(s) (30 mL/Hr) IV Continuous <Continuous>  tamsulosin 0.4 milliGRAM(s) Oral at bedtime  vancomycin  IVPB 1000 milliGRAM(s) IV Intermittent once      Physical Exam  General: Patient comfortable in bed  Vital Signs Last 12 Hrs  T(F): 97 (09-18-20 @ 12:50), Max: 99 (09-18-20 @ 03:56)  HR: 92 (09-18-20 @ 13:15) (80 - 95)  BP: 108/59 (09-18-20 @ 13:15) (91/68 - 116/58)  BP(mean): 81 (09-18-20 @ 13:15) (77 - 81)  RR: 16 (09-18-20 @ 13:05) (16 - 18)  SpO2: 96% (09-18-20 @ 13:15) (90% - 99%)         Chief complaint  Patient is a 59y old  Male who presents with a chief complaint of fever, cough, emesis (18 Sep 2020 07:07)   Review of systems  Patient in bed, looks comfortable, no hypoglycemic episodes.    Labs and Fingersticks  CAPILLARY BLOOD GLUCOSE      POCT Blood Glucose.: 260 mg/dL (18 Sep 2020 13:14)  POCT Blood Glucose.: 196 mg/dL (18 Sep 2020 08:18)  POCT Blood Glucose.: 215 mg/dL (18 Sep 2020 04:37)  POCT Blood Glucose.: 281 mg/dL (17 Sep 2020 22:04)  POCT Blood Glucose.: 149 mg/dL (17 Sep 2020 17:42)  POCT Blood Glucose.: 108 mg/dL (17 Sep 2020 16:29)      Anion Gap, Serum: 12 (09-18 @ 07:26)  Anion Gap, Serum: 13 (09-17 @ 06:23)      Calcium, Total Serum: 9.3 (09-18 @ 07:26)  Calcium, Total Serum: 9.5 (09-17 @ 06:23)          09-18    137  |  103  |  28<H>  ----------------------------<  205<H>  4.3   |  22  |  1.50<H>    Ca    9.3      18 Sep 2020 07:26                          11.8   8.54  )-----------( 239      ( 18 Sep 2020 07:26 )             37.5     Medications  MEDICATIONS  (STANDING):  aspirin  chewable 81 milliGRAM(s) Oral daily  atorvastatin 80 milliGRAM(s) Oral at bedtime  cefTRIAXone   IVPB 2000 milliGRAM(s) IV Intermittent every 24 hours  chlorhexidine 4% Liquid 1 Application(s) Topical <User Schedule>  dextrose 5%. 1000 milliLiter(s) (50 mL/Hr) IV Continuous <Continuous>  dextrose 50% Injectable 12.5 Gram(s) IV Push once  dextrose 50% Injectable 25 Gram(s) IV Push once  dextrose 50% Injectable 25 Gram(s) IV Push once  DOBUTamine Infusion 5 MICROgram(s)/kG/Min (18.7 mL/Hr) IV Continuous <Continuous>  furosemide    Tablet 80 milliGRAM(s) Oral daily  influenza   Vaccine 0.5 milliLiter(s) IntraMuscular once  insulin glargine Injectable (LANTUS) 60 Unit(s) SubCutaneous at bedtime  insulin lispro (HumaLOG) corrective regimen sliding scale   SubCutaneous three times a day before meals  insulin lispro (HumaLOG) corrective regimen sliding scale   SubCutaneous at bedtime  insulin lispro Injectable (HumaLOG) 20 Unit(s) SubCutaneous three times a day with meals  loratadine 10 milliGRAM(s) Oral daily  metoprolol succinate ER 50 milliGRAM(s) Oral every 12 hours  pantoprazole    Tablet 40 milliGRAM(s) Oral before breakfast  phenylephrine    Infusion 0.5 MICROgram(s)/kG/Min (23.4 mL/Hr) IV Continuous <Continuous>  sacubitril 49 mG/valsartan 51 mG 1 Tablet(s) Oral two times a day  sodium chloride 0.9%. 500 milliLiter(s) (30 mL/Hr) IV Continuous <Continuous>  tamsulosin 0.4 milliGRAM(s) Oral at bedtime  vancomycin  IVPB 1000 milliGRAM(s) IV Intermittent once      Physical Exam  General: Patient comfortable in bed  Vital Signs Last 12 Hrs  T(F): 97 (09-18-20 @ 12:50), Max: 99 (09-18-20 @ 03:56)  HR: 92 (09-18-20 @ 13:15) (80 - 95)  BP: 108/59 (09-18-20 @ 13:15) (91/68 - 116/58)  BP(mean): 81 (09-18-20 @ 13:15) (77 - 81)  RR: 16 (09-18-20 @ 13:05) (16 - 18)  SpO2: 96% (09-18-20 @ 13:15) (90% - 99%)

## 2020-09-18 NOTE — CHART NOTE - NSCHARTNOTEFT_GEN_A_CORE
CCU Transfer Note    Transfer to: CCU    Transfer to: ( x ) Medicine    (  ) Telemetry    (  ) RCU    (  ) Palliative    (  ) Stroke Unit    (  ) MICU    (  ) __________________    HPI / CCU COURSE:  59M PMH T2DM, HTN, CAD/MI s/p stents (most recent 2018), HFrEF (10-15%), ICD,  ischemic cardiomyopathy, COPD (not on home O2), HTN, GERD pw fever, n/v, back pain x 1 day. Denies dysuria, hematuria, change in frequency or foul odor. In addition he reports cough without sputum production. He denies HA, weakness, confusion, sinus congestion, CP, abd pain, dysuria, hematuria, diarrhea, or myalgias.     ED Course:  VS: febrile 102.9, HR , BP 63/40 to 105/62, RR 17-25, SpO2%  on RA  Labs: significant for WBC 10.28, thrombocytopenia 146, elevated BUN/SCr 32/1.88, hyperglycemic 247, elevated proBNP 1158, lactate wnl  Micro: COVID-19 negative, RVP negative, UA not concerning for infxn  Imaging: CXR: clr lungs (prelim read)  Orders: received po tylenol 975mg, po motrin 600mg, IV azithromycin 500mg, IV ceftriaxone 1000mg. Midodrine 10mg x 1 received and ordered for 10mg q8h. Started on levophed. Received 500 cc bolus of LR and 500 cc bolus of NS. Urine and Blood Cx sent, CT A/P, chest, non contrast ordered.     Pt initially admitted to MICU for management of hypotension requiring pressor support. While in ICU experienced emesis and diarrhea (without melena/hematochezia) x 1 with SOB. Then transferred to floor after short stay in MICU.    Blood cultures grew GPC's with ID following. EP saw pt to remove ICD, during procedure  ICD didn't break Vfib, pt needed external defib, wires adjusted and pt put on /Oscar and given some fluids. Coming to the CICU for monitoring and weaning pressors. Initially presenting without complaints, MAPs 80s on 2.5 , sats >96% on 1L NC. Cr 1.61, lactate .5.          ASSESSMENT & PLAN:   59M PMH T2DM, HTN, CAD/MI s/p stents (most recent 2018), HFrEF (10-15%), ICD,  ischemic cardiomyopathy, COPD (not on home O2), HTN, GERD pw fever, n/v, back pain x 1 day. Found to be growing GPC's. S/p ICD replacement procedure, had to be externally defibrillated as ICD initially not firing, wires adjusted and working, pt put on /Oscar, in CICU monitoring.    Neuro  - AOx3, no active issues    Respiratory  - satting well on 1L NC, putting on RA will monitor  - Will continue home albuterol prn    Cardiovascular  #Septic Shock  -GPC on Bcx initially, cleared since on abx    #HFref with AICD s/p ICD replacement needing /oscar after procedure  -Off Oscar prior to arrival CICU, off  once pt came up to CICU  -c/w Entresto BID, Toprol BID, pt didn't receive Lasix 80mg this morning prior to procedure will give Lasix 40mg in am and hold tonight   -c/w home ASA, restart plavix    GI/NUTRITION  - Tolerating diet  - Continuing home pantoprazole po 40mg    Renal  #CKD (1.4-1.8 during 2019 admission)  -Cr 1.61 after procedure today, will continue to monitor Cr/Uoutput    #BPH  - continuing home tamsulosin 0.4mg    Hematologic  -c/w ASA, restart Plavix    #DVT ppx  -SQH    ID  #Septic Shock growing GPC's on Bcx, since cleared Bcx's  -On Ceftriaxone 2g qd day 10/42, to get PICC prior to discharge  -Vanco for procedural ppx given    ENDOCRINE:   #IDDM  -Lantus 60 qhs, Lispro 20 with meals, ISS  -FS goal 140-180          FOR FOLLOW UP:  [ ] PICC for IV abx  [ ] restart plavix CCU Transfer Note    Transfer to: CCU    Transfer to: ( x ) Medicine    (  ) Telemetry    (  ) RCU    (  ) Palliative    (  ) Stroke Unit    (  ) MICU    (  ) __________________    HPI / CCU COURSE:  59M PMH T2DM, HTN, CAD/MI s/p stents (most recent 2018), HFrEF (10-15%), ICD,  ischemic cardiomyopathy, COPD (not on home O2), HTN, GERD pw fever, n/v, back pain x 1 day. Denies dysuria, hematuria, change in frequency or foul odor. In addition he reports cough without sputum production. He denies HA, weakness, confusion, sinus congestion, CP, abd pain, dysuria, hematuria, diarrhea, or myalgias.     ED Course:  VS: febrile 102.9, HR , BP 63/40 to 105/62, RR 17-25, SpO2%  on RA  Labs: significant for WBC 10.28, thrombocytopenia 146, elevated BUN/SCr 32/1.88, hyperglycemic 247, elevated proBNP 1158, lactate wnl  Micro: COVID-19 negative, RVP negative, UA not concerning for infxn  Imaging: CXR: clr lungs (prelim read)  Orders: received po tylenol 975mg, po motrin 600mg, IV azithromycin 500mg, IV ceftriaxone 1000mg. Midodrine 10mg x 1 received and ordered for 10mg q8h. Started on levophed. Received 500 cc bolus of LR and 500 cc bolus of NS. Urine and Blood Cx sent, CT A/P, chest, non contrast ordered.     Pt initially admitted to MICU for management of hypotension requiring pressor support. While in ICU experienced emesis and diarrhea (without melena/hematochezia) x 1 with SOB. Then transferred to floor after short stay in MICU.    Blood cultures grew GPC's with ID following. EP saw pt to remove ICD, during procedure  ICD didn't break Vfib, pt needed external defib, wires adjusted and pt put on /Oscar and given some fluids. Coming to the CICU for monitoring and weaning pressors. Initially presenting without complaints, MAPs 80s on 2.5 , sats >96% on 1L NC. Cr 1.61, lactate .5.          ASSESSMENT & PLAN:   59M PMH T2DM, HTN, CAD/MI s/p stents (most recent 2018), HFrEF (10-15%), ICD,  ischemic cardiomyopathy, COPD (not on home O2), HTN, GERD pw fever, n/v, back pain x 1 day. Found to be growing GPC's. S/p ICD replacement procedure, had to be externally defibrillated as ICD initially not firing, wires adjusted and working, pt put on /Oscar, in CICU monitoring.    Neuro  - AOx3, no active issues    Respiratory  - satting well on 1L NC, putting on RA will monitor  - Will continue home albuterol prn    Cardiovascular  #Septic Shock  -GPC on Bcx initially, cleared since on abx    #HFref with AICD s/p ICD replacement needing /oscar after procedure  -Off Oscar prior to arrival CICU, off  once pt came up to CICU  -c/w Entresto BID, Toprol BID, pt didn't receive Lasix 80mg this morning prior to procedure will give Lasix 40mg in am and hold tonight   -c/w home ASA, restart plavix    GI/NUTRITION  - Tolerating diet  - Continuing home pantoprazole po 40mg    Renal  #CKD (1.4-1.8 during 2019 admission)  -Cr 1.61 after procedure today, will continue to monitor Cr/Uoutput    #BPH  - continuing home tamsulosin 0.4mg    Hematologic  -c/w ASA, restart Plavix    #DVT ppx  -SQH    ID  #Septic Shock growing GPC's on Bcx, since cleared Bcx's  -On Ceftriaxone 2g qd day 10/42, to get PICC prior to discharge  -Vanco for procedural ppx given    ENDOCRINE:   #IDDM  -Lantus 60 qhs, Lispro 20 with meals, ISS  -FS goal 140-180          FOR FOLLOW UP:  [ ] PICC for IV abx  [ ] restart plavix  [ ] uptitrate Lasix to 80 qd?, on 80 prior to procedure, given 40 morning after procedure (lasix 80 held on day of procedure)

## 2020-09-18 NOTE — PROGRESS NOTE ADULT - ASSESSMENT
58 yo man with HTN, T2DM, CAD/MI s/p stents (most recent 2/2018), HFrEF (10-15%) s/p ICD, COPD, HTN, GERD, BPH admitted 9/9/2020 for sepsis 2/2 gram+ Strep Gallolyticus bacteremia s/p AICD 9/10/2020 for source. GI consulted for colonoscopy.    Impression:  # Strep Gallolyticus bacteremia: Associated with possible colon cancer. Mildly anemia but no weight loss or change in stool caliber. No prior colonoscopy. Patient last plavix dose was 9/12 s/p colonoscopy with multiple polyps seen  # CAD: on DAPT, last stent in 2019.   # HFrEF s/p AICD removal  # Hypertension  # COPD    Recommendation:  - monitor CBC  - await pathology  - GI to follow  - rest of care per primary team

## 2020-09-18 NOTE — PRE-ANESTHESIA EVALUATION ADULT - NSANTHPMHFT_GEN_ALL_CORE
58yo man with DM2, HTN, CAD/MI s/p stents (last '18), HFrEF (10-15%), COPD, GERD, admitted with infected ICD lead now s/p extraction.
COPD not on home O2  Sepsis  MI 2005/2006  LUIS ALFREDO    PCI w/ stents x3 2018, 2016    AICD s/p explantation. For replacement on 09/18/2020
Medical record reviewed including: ICM HFrEF 10% AICD for sudden death admit with sepsis no clear source now off pressors hemostable/room air for device extraction.

## 2020-09-18 NOTE — PRE-ANESTHESIA EVALUATION ADULT - MALLAMPATI CLASS
Class II - visualization of the soft palate, fauces, and uvula
Class IV (difficult) - the soft palate is not visible at all
Class II - visualization of the soft palate, fauces, and uvula

## 2020-09-18 NOTE — PROVIDER CONTACT NOTE (OTHER) - ACTION/TREATMENT ORDERED:
NP notified and made aware. Continue to monitor.
NP made aware, continue to monitor.
NP made aware. No further interventions at this time. Recheck BP before AM meds. Will continue to monitor.

## 2020-09-18 NOTE — PROGRESS NOTE ADULT - ASSESSMENT
Assessment: 59M PMH T2DM, HTN, CAD/MI s/p stents (most recent 2/2018), ICM w/ HFrEF (10-15%) s/p AICD, COPD (not on home O2), HTN, GERD, BPH, who is admitted for sepsis 2/2 gram positive bacteremia.   s/p MICU stay       # Strep Gallolyticus bacteremia  -Continue with Ceftriaxone   -MRI Lumbar spine findings consistent with Acute Osteomyelitis.    -s/p Screening colonoscopy today s/p polyps removed, follow up biopsy.     # Hypotension secondary to Septic Shock  - resolved, off Midodrine   - continue furosemide, Metoprolol    Diuresing as needed    # HFrEF s/p AICD  - most recent echo from 12/2019: EF 10-15% sev global LV systolic dysfunction. eccentric LVH  - cardiology following   - resumed b-blocker but holding onto rest of HF meds.  - s/p AICD extraction in setting of Group D Strep Bacteremia  -Plan for AICD placement today.     # CAD s/p stent  - c/w home DAPT (ASA off Plavix fro colonoscopy on Thursday.   -High dose statin.      GI/NUTRITION  - GI PCR negative.   - Continuing home pantoprazole po 40mg  - diabetic diet.  - GI consulted, recommend patient to be off Plavix for 5 days for colonoscopy.   -Resume Plavix tomorrow.       - MANJIT improving -   - continuing home tamsulosin 0.4mg  -renal following       Hematologic  - Monitor CBC, currently stable    #DVT ppx  - SCDs for DVT ppx    # Hyperglycemia   - Pt w/ hx of IDDM on home Lantus 66u qhs and Humalog 18 units pre meal   - Will monitor FSS with moderate ISS    Ethics  - GOC discussed Patient wants full code.

## 2020-09-18 NOTE — CHART NOTE - NSCHARTNOTEFT_GEN_A_CORE
60y/o male h/o morbidly obesity h/o COPD (not on home O2), DM, HTN, MI, CAD s/p PCI (2/2018), ICM w/ HFrEF w/ 10-15% s/p primary prevention [employer] single chamber ICD, HTN, GERD, BPH, p/w sepsis secondary to gram positive bacteremia s/p ICD extraction 9/10. Now s/p S-ICD implant 9/18 w/MANN Rodríguez.    1. MI, CAD, ICM   2. HFrEF w/ EF 10-15%  3. Streptococcus Gallolyticus Bacteremia   4. s/p Single chamber ICD extraction 9/10    --s/p S-ICD implant (BSC)   --PA/LAT CXR in am  --1g Vanco at midnight tonight  --No heparin or lovenox products  --Continue antibiotics per ID  --F/u appt for staple removal to be scheduled    [id number]

## 2020-09-18 NOTE — PROGRESS NOTE ADULT - SUBJECTIVE AND OBJECTIVE BOX
SEVERIANO MARTINEZ 59y [id number]    Patient is a 59y old  Male who presents with a chief complaint of fever, cough, emesis (18 Sep 2020 13:54)      Follow Up/CC:  ID following for bacteremia    Interval History/ROS: no fever, in PACU    Allergies    No Known Allergies    Intolerances        ANTIMICROBIALS:  cefTRIAXone   IVPB 2000 every 24 hours  vancomycin  IVPB 1000 once      MEDICATIONS  (STANDING):  aspirin  chewable 81 milliGRAM(s) Oral daily  atorvastatin 80 milliGRAM(s) Oral at bedtime  cefTRIAXone   IVPB 2000 milliGRAM(s) IV Intermittent every 24 hours  chlorhexidine 4% Liquid 1 Application(s) Topical <User Schedule>  dextrose 5%. 1000 milliLiter(s) (50 mL/Hr) IV Continuous <Continuous>  dextrose 50% Injectable 12.5 Gram(s) IV Push once  dextrose 50% Injectable 25 Gram(s) IV Push once  dextrose 50% Injectable 25 Gram(s) IV Push once  DOBUTamine Infusion 5 MICROgram(s)/kG/Min (18.7 mL/Hr) IV Continuous <Continuous>  furosemide    Tablet 80 milliGRAM(s) Oral daily  influenza   Vaccine 0.5 milliLiter(s) IntraMuscular once  insulin glargine Injectable (LANTUS) 60 Unit(s) SubCutaneous at bedtime  insulin lispro (HumaLOG) corrective regimen sliding scale   SubCutaneous three times a day before meals  insulin lispro (HumaLOG) corrective regimen sliding scale   SubCutaneous at bedtime  insulin lispro Injectable (HumaLOG) 20 Unit(s) SubCutaneous three times a day with meals  loratadine 10 milliGRAM(s) Oral daily  metoprolol succinate ER 50 milliGRAM(s) Oral every 12 hours  pantoprazole    Tablet 40 milliGRAM(s) Oral before breakfast  phenylephrine    Infusion 0.5 MICROgram(s)/kG/Min (23.4 mL/Hr) IV Continuous <Continuous>  sacubitril 49 mG/valsartan 51 mG 1 Tablet(s) Oral two times a day  sodium chloride 0.9%. 500 milliLiter(s) (30 mL/Hr) IV Continuous <Continuous>  tamsulosin 0.4 milliGRAM(s) Oral at bedtime  vancomycin  IVPB 1000 milliGRAM(s) IV Intermittent once    MEDICATIONS  (PRN):  acetaminophen   Tablet .. 650 milliGRAM(s) Oral every 6 hours PRN Temp greater or equal to 38C (100.4F), Mild Pain (1 - 3)  ALBUTerol    90 MICROgram(s) HFA Inhaler 2 Puff(s) Inhalation every 12 hours PRN Shortness of Breath and/or Wheezing  dexAMETHasone  IVPB 8 milliGRAM(s) IV Intermittent once PRN Nausea and/or Vomiting  dextrose 40% Gel 15 Gram(s) Oral once PRN Blood Glucose LESS THAN 70 milliGRAM(s)/deciliter  fentaNYL    Injectable 25 MICROGram(s) IV Push every 5 minutes PRN Moderate Pain (4 - 6)  glucagon  Injectable 1 milliGRAM(s) IntraMuscular once PRN Glucose LESS THAN 70 milligrams/deciliter  HYDROmorphone  Injectable 0.25 milliGRAM(s) IV Push every 10 minutes PRN Moderate Pain (4 - 6)  ondansetron Injectable 4 milliGRAM(s) IV Push once PRN Nausea and/or Vomiting        Vital Signs Last 24 Hrs  T(C): 36.1 (18 Sep 2020 12:50), Max: 37.2 (17 Sep 2020 22:23)  T(F): 97 (18 Sep 2020 12:50), Max: 99 (17 Sep 2020 22:23)  HR: 91 (18 Sep 2020 13:45) (71 - 99)  BP: 116/58 (18 Sep 2020 13:45) (91/68 - 119/74)  BP(mean): 81 (18 Sep 2020 13:45) (77 - 81)  RR: 17 (18 Sep 2020 13:45) (12 - 19)  SpO2: 97% (18 Sep 2020 13:45) (90% - 99%)    CBC Full  -  ( 18 Sep 2020 07:26 )  WBC Count : 8.54 K/uL  RBC Count : 4.55 M/uL  Hemoglobin : 11.8 g/dL  Hematocrit : 37.5 %  Platelet Count - Automated : 239 K/uL  Mean Cell Volume : 82.4 fl  Mean Cell Hemoglobin : 25.9 pg  Mean Cell Hemoglobin Concentration : 31.5 gm/dL  Auto Neutrophil # : x  Auto Lymphocyte # : x  Auto Monocyte # : x  Auto Eosinophil # : x  Auto Basophil # : x  Auto Neutrophil % : x  Auto Lymphocyte % : x  Auto Monocyte % : x  Auto Eosinophil % : x  Auto Basophil % : x    09-18    137  |  103  |  28<H>  ----------------------------<  205<H>  4.3   |  22  |  1.50<H>    Ca    9.3      18 Sep 2020 07:26            MICROBIOLOGY:  .Blood Blood-Peripheral  09-14-20   No growth to date.  --  --      .Surgical Swab icd lead  09-10-20   No growth at 5 days  --  --      .Urine Bladder (from O.R.)  09-10-20   No growth  --  --      .Blood Blood-Peripheral  09-10-20   No Growth Final  --  --      .Blood Blood  09-09-20   No Growth Final  --  --      .Stool Feces sarina jose  09-09-20   GI PCR Results: NOT detected  *******Please Note:*******  GI panel PCR evaluates for:  Campylobacter, Plesiomonas shigelloides, Salmonella,  Vibrio, Yersinia enterocolitica, Enteroaggregative  Escherichia coli (EAEC), Enteropathogenic E.coli (EPEC),  Enterotoxigenic E. coli (ETEC) lt/st, Shiga-like  toxin-producing E. coli (STEC) stx1/stx2,  Shigella/ Enteroinvasive E. coli (EIEC), Cryptosporidium,  Cyclospora cayetanensis, Entamoeba histolytica,  Giardia lamblia, Adenovirus F 40/41, Astrovirus,  Norovirus GI/GII, Rotavirus A, Sapovirus  --  --      .Urine Clean Catch (Midstream)  09-09-20   <10,000 CFU/mL Normal Urogenital Dorothy  --  --      .Blood Blood-Peripheral  09-09-20   Growth in aerobic and anaerobic bottles: Streptococcus gallolyticus  "Due to technical problems, Proteus sp. will Not be reported as part of  the BCID panel until further notice"  ***Blood Panel PCR results on this specimen are available  approximately 3 hours after the Gram stain result.***  Gram stain, PCR, and/or culture results may not always  correspond due to difference in methodologies.  ************************************************************  This PCR assay was performed using [employer].  The following targets are tested for: Enterococcus,  vancomycin resistant enterococci, Listeria monocytogenes,  coagulase negative staphylococci, S. aureus,  methicillin resistant S. aureus, Streptococcus agalactiae  (Group B), S. pneumoniae, S. pyogenes (Group A),  Acinetobacter baumannii, Enterobacter cloacae, E. coli,  Klebsiella oxytoca, K. pneumoniae, Proteus sp.,  Serratia marcescens, Haemophilus influenzae,  Neisseria meningitidis, Pseudomonas aeruginosa, Candida  albicans, C. glabrata, C krusei, C parapsilosis,  C. tropicalis and the KPC resistance gene.  --  Blood Culture PCR  Streptococcus gallolyticus          COVID-19 PCR . (09.18.20 @ 00:31)   COVID-19 PCR: NotDetec: Testing is performed using polymerase chain reaction (PCR) or   transcription mediated amplification (TMA). This COVID-19 (SARS-CoV-2)   nucleic acid amplification test was validated by [employer] and is   in use under the FDA Emergency Use Authorization (EUA) for clinical labs   CLIA-certified to perform high complexity testing. Test results should be   correlated with clinical presentation, patient history, and epidemiology.     RADIOLOGY    < from: MR Lumbar Spine w/wo IV Cont (09.14.20 @ 22:52) >  Fatty endplate change at the L4-5 level. In addition, on T2 there is disc space hyperintensity at this level. There is evidence of enhancement at the level of the disc space with some subtle extension into the endplate. As the degenerative changes appear to be most significant at this level findings likely relate to degenerative change. However, cannot entirely exclude infection recommend correlation with clinical parameters.    < end of copied text >

## 2020-09-19 LAB
ALBUMIN SERPL ELPH-MCNC: 3.7 G/DL — SIGNIFICANT CHANGE UP (ref 3.3–5)
ALP SERPL-CCNC: 83 U/L — SIGNIFICANT CHANGE UP (ref 40–120)
ALT FLD-CCNC: 32 U/L — SIGNIFICANT CHANGE UP (ref 10–45)
ANION GAP SERPL CALC-SCNC: 12 MMOL/L — SIGNIFICANT CHANGE UP (ref 5–17)
AST SERPL-CCNC: 24 U/L — SIGNIFICANT CHANGE UP (ref 10–40)
BASOPHILS # BLD AUTO: 0.06 K/UL — SIGNIFICANT CHANGE UP (ref 0–0.2)
BASOPHILS NFR BLD AUTO: 0.5 % — SIGNIFICANT CHANGE UP (ref 0–2)
BILIRUB SERPL-MCNC: 0.3 MG/DL — SIGNIFICANT CHANGE UP (ref 0.2–1.2)
BUN SERPL-MCNC: 24 MG/DL — HIGH (ref 7–23)
CALCIUM SERPL-MCNC: 9.3 MG/DL — SIGNIFICANT CHANGE UP (ref 8.4–10.5)
CHLORIDE SERPL-SCNC: 103 MMOL/L — SIGNIFICANT CHANGE UP (ref 96–108)
CO2 SERPL-SCNC: 21 MMOL/L — LOW (ref 22–31)
CREAT SERPL-MCNC: 1.43 MG/DL — HIGH (ref 0.5–1.3)
CULTURE RESULTS: SIGNIFICANT CHANGE UP
CULTURE RESULTS: SIGNIFICANT CHANGE UP
EOSINOPHIL # BLD AUTO: 0.48 K/UL — SIGNIFICANT CHANGE UP (ref 0–0.5)
EOSINOPHIL NFR BLD AUTO: 4.4 % — SIGNIFICANT CHANGE UP (ref 0–6)
GLUCOSE BLDC GLUCOMTR-MCNC: 127 MG/DL — HIGH (ref 70–99)
GLUCOSE BLDC GLUCOMTR-MCNC: 152 MG/DL — HIGH (ref 70–99)
GLUCOSE BLDC GLUCOMTR-MCNC: 236 MG/DL — HIGH (ref 70–99)
GLUCOSE BLDC GLUCOMTR-MCNC: 247 MG/DL — HIGH (ref 70–99)
GLUCOSE SERPL-MCNC: 286 MG/DL — HIGH (ref 70–99)
HCT VFR BLD CALC: 37.9 % — LOW (ref 39–50)
HGB BLD-MCNC: 11.7 G/DL — LOW (ref 13–17)
IMM GRANULOCYTES NFR BLD AUTO: 1.9 % — HIGH (ref 0–1.5)
LYMPHOCYTES # BLD AUTO: 1.08 K/UL — SIGNIFICANT CHANGE UP (ref 1–3.3)
LYMPHOCYTES # BLD AUTO: 9.9 % — LOW (ref 13–44)
MAGNESIUM SERPL-MCNC: 2.2 MG/DL — SIGNIFICANT CHANGE UP (ref 1.6–2.6)
MCHC RBC-ENTMCNC: 25.9 PG — LOW (ref 27–34)
MCHC RBC-ENTMCNC: 30.9 GM/DL — LOW (ref 32–36)
MCV RBC AUTO: 83.8 FL — SIGNIFICANT CHANGE UP (ref 80–100)
MONOCYTES # BLD AUTO: 0.82 K/UL — SIGNIFICANT CHANGE UP (ref 0–0.9)
MONOCYTES NFR BLD AUTO: 7.5 % — SIGNIFICANT CHANGE UP (ref 2–14)
NEUTROPHILS # BLD AUTO: 8.26 K/UL — HIGH (ref 1.8–7.4)
NEUTROPHILS NFR BLD AUTO: 75.8 % — SIGNIFICANT CHANGE UP (ref 43–77)
NRBC # BLD: 0 /100 WBCS — SIGNIFICANT CHANGE UP (ref 0–0)
PHOSPHATE SERPL-MCNC: 2.7 MG/DL — SIGNIFICANT CHANGE UP (ref 2.5–4.5)
PLATELET # BLD AUTO: 221 K/UL — SIGNIFICANT CHANGE UP (ref 150–400)
POTASSIUM SERPL-MCNC: 4.3 MMOL/L — SIGNIFICANT CHANGE UP (ref 3.5–5.3)
POTASSIUM SERPL-SCNC: 4.3 MMOL/L — SIGNIFICANT CHANGE UP (ref 3.5–5.3)
PROT SERPL-MCNC: 7 G/DL — SIGNIFICANT CHANGE UP (ref 6–8.3)
RBC # BLD: 4.52 M/UL — SIGNIFICANT CHANGE UP (ref 4.2–5.8)
RBC # FLD: 15.8 % — HIGH (ref 10.3–14.5)
SODIUM SERPL-SCNC: 136 MMOL/L — SIGNIFICANT CHANGE UP (ref 135–145)
SPECIMEN SOURCE: SIGNIFICANT CHANGE UP
SPECIMEN SOURCE: SIGNIFICANT CHANGE UP
WBC # BLD: 10.91 K/UL — HIGH (ref 3.8–10.5)
WBC # FLD AUTO: 10.91 K/UL — HIGH (ref 3.8–10.5)

## 2020-09-19 PROCEDURE — 33270 INS/REP SUBQ DEFIBRILLATOR: CPT | Mod: 58

## 2020-09-19 PROCEDURE — 71046 X-RAY EXAM CHEST 2 VIEWS: CPT | Mod: 26

## 2020-09-19 PROCEDURE — 93010 ELECTROCARDIOGRAM REPORT: CPT

## 2020-09-19 PROCEDURE — 99232 SBSQ HOSP IP/OBS MODERATE 35: CPT

## 2020-09-19 PROCEDURE — 71045 X-RAY EXAM CHEST 1 VIEW: CPT | Mod: 26

## 2020-09-19 RX ORDER — FUROSEMIDE 40 MG
40 TABLET ORAL ONCE
Refills: 0 | Status: COMPLETED | OUTPATIENT
Start: 2020-09-19 | End: 2020-09-19

## 2020-09-19 RX ORDER — FUROSEMIDE 40 MG
80 TABLET ORAL DAILY
Refills: 0 | Status: DISCONTINUED | OUTPATIENT
Start: 2020-09-19 | End: 2020-09-21

## 2020-09-19 RX ORDER — CLOPIDOGREL BISULFATE 75 MG/1
75 TABLET, FILM COATED ORAL DAILY
Refills: 0 | Status: DISCONTINUED | OUTPATIENT
Start: 2020-09-19 | End: 2020-09-21

## 2020-09-19 RX ADMIN — Medication 4: at 08:21

## 2020-09-19 RX ADMIN — TAMSULOSIN HYDROCHLORIDE 0.4 MILLIGRAM(S): 0.4 CAPSULE ORAL at 21:50

## 2020-09-19 RX ADMIN — Medication 650 MILLIGRAM(S): at 15:50

## 2020-09-19 RX ADMIN — Medication 50 MILLIGRAM(S): at 08:20

## 2020-09-19 RX ADMIN — Medication 20 UNIT(S): at 08:21

## 2020-09-19 RX ADMIN — SACUBITRIL AND VALSARTAN 1 TABLET(S): 24; 26 TABLET, FILM COATED ORAL at 21:50

## 2020-09-19 RX ADMIN — Medication 20 UNIT(S): at 13:12

## 2020-09-19 RX ADMIN — Medication 40 MILLIGRAM(S): at 01:27

## 2020-09-19 RX ADMIN — Medication 20 UNIT(S): at 17:17

## 2020-09-19 RX ADMIN — Medication 650 MILLIGRAM(S): at 20:18

## 2020-09-19 RX ADMIN — ATORVASTATIN CALCIUM 80 MILLIGRAM(S): 80 TABLET, FILM COATED ORAL at 21:50

## 2020-09-19 RX ADMIN — Medication 50 MILLIGRAM(S): at 23:04

## 2020-09-19 RX ADMIN — INSULIN GLARGINE 60 UNIT(S): 100 INJECTION, SOLUTION SUBCUTANEOUS at 21:51

## 2020-09-19 RX ADMIN — CLOPIDOGREL BISULFATE 75 MILLIGRAM(S): 75 TABLET, FILM COATED ORAL at 17:17

## 2020-09-19 RX ADMIN — SACUBITRIL AND VALSARTAN 1 TABLET(S): 24; 26 TABLET, FILM COATED ORAL at 08:25

## 2020-09-19 RX ADMIN — Medication 2: at 17:16

## 2020-09-19 RX ADMIN — CEFTRIAXONE 100 MILLIGRAM(S): 500 INJECTION, POWDER, FOR SOLUTION INTRAMUSCULAR; INTRAVENOUS at 21:50

## 2020-09-19 RX ADMIN — LORATADINE 10 MILLIGRAM(S): 10 TABLET ORAL at 08:20

## 2020-09-19 RX ADMIN — CHLORHEXIDINE GLUCONATE 1 APPLICATION(S): 213 SOLUTION TOPICAL at 08:30

## 2020-09-19 RX ADMIN — Medication 81 MILLIGRAM(S): at 08:20

## 2020-09-19 RX ADMIN — PANTOPRAZOLE SODIUM 40 MILLIGRAM(S): 20 TABLET, DELAYED RELEASE ORAL at 08:20

## 2020-09-19 RX ADMIN — Medication 40 MILLIGRAM(S): at 08:20

## 2020-09-19 NOTE — PROGRESS NOTE ADULT - SUBJECTIVE AND OBJECTIVE BOX
SEVERIANO MARTINEZ 59y [id number]    Patient is a 59y old  Male who presents with a chief complaint of fever, cough, emesis (18 Sep 2020 23:30)      Follow Up/CC:  ID following for bacteremia    Interval History/ROS: in CCU, feels ok, in chair, no fever    Allergies    No Known Allergies    Intolerances        ANTIMICROBIALS:  cefTRIAXone   IVPB 2000 every 24 hours      MEDICATIONS  (STANDING):  aspirin  chewable 81 milliGRAM(s) Oral daily  atorvastatin 80 milliGRAM(s) Oral at bedtime  cefTRIAXone   IVPB 2000 milliGRAM(s) IV Intermittent every 24 hours  chlorhexidine 4% Liquid 1 Application(s) Topical <User Schedule>  dextrose 5%. 1000 milliLiter(s) (50 mL/Hr) IV Continuous <Continuous>  dextrose 50% Injectable 12.5 Gram(s) IV Push once  dextrose 50% Injectable 25 Gram(s) IV Push once  dextrose 50% Injectable 25 Gram(s) IV Push once  furosemide    Tablet 40 milliGRAM(s) Oral daily  influenza   Vaccine 0.5 milliLiter(s) IntraMuscular once  insulin glargine Injectable (LANTUS) 60 Unit(s) SubCutaneous at bedtime  insulin lispro (HumaLOG) corrective regimen sliding scale   SubCutaneous three times a day before meals  insulin lispro (HumaLOG) corrective regimen sliding scale   SubCutaneous at bedtime  insulin lispro Injectable (HumaLOG) 20 Unit(s) SubCutaneous three times a day with meals  loratadine 10 milliGRAM(s) Oral daily  metoprolol succinate ER 50 milliGRAM(s) Oral every 12 hours  pantoprazole    Tablet 40 milliGRAM(s) Oral before breakfast  sacubitril 49 mG/valsartan 51 mG 1 Tablet(s) Oral two times a day  tamsulosin 0.4 milliGRAM(s) Oral at bedtime    MEDICATIONS  (PRN):  acetaminophen   Tablet .. 650 milliGRAM(s) Oral every 6 hours PRN Temp greater or equal to 38C (100.4F), Mild Pain (1 - 3)  ALBUTerol    90 MICROgram(s) HFA Inhaler 2 Puff(s) Inhalation every 12 hours PRN Shortness of Breath and/or Wheezing  dextrose 40% Gel 15 Gram(s) Oral once PRN Blood Glucose LESS THAN 70 milliGRAM(s)/deciliter  glucagon  Injectable 1 milliGRAM(s) IntraMuscular once PRN Glucose LESS THAN 70 milligrams/deciliter        Vital Signs Last 24 Hrs  T(C): 36.6 (19 Sep 2020 04:00), Max: 36.8 (18 Sep 2020 08:31)  T(F): 97.9 (19 Sep 2020 04:00), Max: 98.2 (18 Sep 2020 08:31)  HR: 88 (19 Sep 2020 06:00) (80 - 100)  BP: 98/61 (19 Sep 2020 06:00) (98/61 - 128/75)  BP(mean): 74 (19 Sep 2020 06:00) (74 - 84)  RR: 19 (19 Sep 2020 06:00) (16 - 23)  SpO2: 96% (19 Sep 2020 06:00) (90% - 99%)    CBC Full  -  ( 19 Sep 2020 05:34 )  WBC Count : 10.91 K/uL  RBC Count : 4.52 M/uL  Hemoglobin : 11.7 g/dL  Hematocrit : 37.9 %  Platelet Count - Automated : 221 K/uL  Mean Cell Volume : 83.8 fl  Mean Cell Hemoglobin : 25.9 pg  Mean Cell Hemoglobin Concentration : 30.9 gm/dL  Auto Neutrophil # : 8.26 K/uL  Auto Lymphocyte # : 1.08 K/uL  Auto Monocyte # : 0.82 K/uL  Auto Eosinophil # : 0.48 K/uL  Auto Basophil # : 0.06 K/uL  Auto Neutrophil % : 75.8 %  Auto Lymphocyte % : 9.9 %  Auto Monocyte % : 7.5 %  Auto Eosinophil % : 4.4 %  Auto Basophil % : 0.5 %    09-19    136  |  103  |  24<H>  ----------------------------<  286<H>  4.3   |  21<L>  |  1.43<H>    Ca    9.3      19 Sep 2020 05:34  Phos  2.7     09-19  Mg     2.2     09-19    TPro  7.0  /  Alb  3.7  /  TBili  0.3  /  DBili  x   /  AST  24  /  ALT  32  /  AlkPhos  83  09-19    LIVER FUNCTIONS - ( 19 Sep 2020 05:34 )  Alb: 3.7 g/dL / Pro: 7.0 g/dL / ALK PHOS: 83 U/L / ALT: 32 U/L / AST: 24 U/L / GGT: x               MICROBIOLOGY:  .Blood Blood-Peripheral  09-14-20   No growth to date.  --  --      .Surgical Swab icd lead  09-10-20   No growth at 5 days  --  --      .Urine Bladder (from O.R.)  09-10-20   No growth  --  --      .Blood Blood-Peripheral  09-10-20   No Growth Final  --  --      .Blood Blood  09-09-20   No Growth Final  --  --      .Stool Feces sarina jose  09-09-20   GI PCR Results: NOT detected  *******Please Note:*******  GI panel PCR evaluates for:  Campylobacter, Plesiomonas shigelloides, Salmonella,  Vibrio, Yersinia enterocolitica, Enteroaggregative  Escherichia coli (EAEC), Enteropathogenic E.coli (EPEC),  Enterotoxigenic E. coli (ETEC) lt/st, Shiga-like  toxin-producing E. coli (STEC) stx1/stx2,  Shigella/ Enteroinvasive E. coli (EIEC), Cryptosporidium,  Cyclospora cayetanensis, Entamoeba histolytica,  Giardia lamblia, Adenovirus F 40/41, Astrovirus,  Norovirus GI/GII, Rotavirus A, Sapovirus  --  --      .Urine Clean Catch (Midstream)  09-09-20   <10,000 CFU/mL Normal Urogenital Dorothy  --  --      .Blood Blood-Peripheral  09-09-20   Growth in aerobic and anaerobic bottles: Streptococcus gallolyticus  "Due to technical problems, Proteus sp. will Not be reported as part of  the BCID panel until further notice"  ***Blood Panel PCR results on this specimen are available  approximately 3 hours after the Gram stain result.***  Gram stain, PCR, and/or culture results may not always  correspond due to difference in methodologies.  ************************************************************  This PCR assay was performed using [employer].  The following targets are tested for: Enterococcus,  vancomycin resistant enterococci, Listeria monocytogenes,  coagulase negative staphylococci, S. aureus,  methicillin resistant S. aureus, Streptococcus agalactiae  (Group B), S. pneumoniae, S. pyogenes (Group A),  Acinetobacter baumannii, Enterobacter cloacae, E. coli,  Klebsiella oxytoca, K. pneumoniae, Proteus sp.,  Serratia marcescens, Haemophilus influenzae,  Neisseria meningitidis, Pseudomonas aeruginosa, Candida  albicans, C. glabrata, C krusei, C parapsilosis,  C. tropicalis and the KPC resistance gene.  --  Blood Culture PCR  Streptococcus gallolyticus        COVID-19 PCR . (09.18.20 @ 00:31)   COVID-19 PCR: NotDetec: Testing is performed using polymerase chain reaction (PCR) or   transcription mediated amplification (TMA). This COVID-19 (SARS-CoV-2)   nucleic acid amplification test was validated by [employer] and is   in use under the FDA Emergency Use Authorization (EUA) for clinical labs   CLIA-certified to perform high complexity testing. Test results should be   correlated with clinical presentation, patient history, and epidemiology.     RADIOLOGY    < from: Xray Chest 1 View- PORTABLE-Urgent (Xray Chest 1 View- PORTABLE-Urgent .) (09.18.20 @ 13:26) >  Right-sided subcutaneous ICD in place. Increasing pulmonary edema. No pneumothorax.    < end of copied text >

## 2020-09-19 NOTE — PROGRESS NOTE ADULT - ASSESSMENT
58y/o male h/o morbidly obesity h/o COPD (not on home O2), DM, HTN, MI, CAD s/p PCI (2/2018), ICM w/ HFrEF w/ 10-15% s/p primary prevention [employer] single chamber ICD, HTN, GERD, BPH, p/w sepsis secondary to gram positive bacteremia s/p ICD extraction 9/10    1. MI, CAD, ICM   2. HFrEF w/ EF 10-15%  3. Streptococcus Gallolyticus Bacteremia, s/p colonoscopy to r/o Ca   4. s/p Single chamber ICD extraction 9/10  5. s/p Subcutaneous ICD placement 9/18/20   6. MRI w/possible early OM    -S/p Subcutaneous ICD (BSC)  -s/p 1g Vanco. Cont abx per ID, will require PICC placement prior to discharge  -CXR and reveals appropriate lead placement   -Left lateral chest S-ICD site with staples, dressing to be removed tomorrow  -Post op interrogation done and device paired by (BSC) rep; Normal device function  -PPM ID card and booklet given to patient, teaching enforced and patient verbalized understanding  -Wound check appt scheduled for 09/28/20 at 1340.  -Clear from EP perspective for discharge planning pending PICC placement or 6 wk course of ABX secondary to possible OM seen on MRI per ID    [phone]

## 2020-09-19 NOTE — PROGRESS NOTE ADULT - SUBJECTIVE AND OBJECTIVE BOX
SEVERIANO MARTINEZ  MRN-[id number]  Patient is a 59y old  Male who presents with a chief complaint of fever, cough, emesis (19 Sep 2020 07:32)    HPI:  59M PMH T2DM, HTN, CAD/MI s/p stents (most recent 2/2018), HFrEF (10-15%), ICD,  ischemic cardiomyopathy, COPD, HTN, GERD pw fever, n/v, back pain x 1 day. Pt states that he was in his baseline state of health when he began to experience back pain while grocery shopping yesterday. He describes sudden onset left lower back pain, palliated by sitting upright, of sharp quality, without radiation, of 7/10 severity, which completely resolved when presenting to ED. After returning home from shopping he experienced chills but did not measure his temperature. After drinking 1 bottle of water he felt nauseous and had an episode of NBNB emesis, with resolved nausea afterward. He reports one instance of cloudy/dark urine while admitted, but denies dysuria, hematuria, change in frequency or foul odor. In addition he reports cough without sputum production. He denies HA, weakness, confusion, sinus congestion, CP, abd pain, dysuria, hematuria, diarrhea, or myalgias.     ED Course:  VS: febrile 102.9, HR , BP 63/40 to 105/62, RR 17-25, SpO2%  on RA  Labs: significant for WBC 10.28, thrombocytopenia 146, elevated BUN/SCr 32/1.88, hyperglycemic 247, elevated proBNP 1158, lactate wnl  Micro: COVID-19 negative, RVP negative, UA not concerning for infxn  Imaging: CXR: clr lungs (prelim read)  Orders: received po tylenol 975mg, po motrin 600mg, IV azithromycin 500mg, IV ceftriaxone 1000mg. Midodrine 10mg x 1 received and ordered for 10mg q8h. Started on levophed. Received 500 cc bolus of LR and 500 cc bolus of NS. Urine and Blood Cx sent, CT A/P, chest, non contrast ordered.     Pt admitted to MICU for management of hypotension requiring pressor support. While in ICU experienced emesis and diarrhea (without melena/hematochezia) x 1 with SOB. (09 Sep 2020 02:30)      24 HOUR EVENTS:    REVIEW OF SYSTEMS:    CONSTITUTIONAL: No weakness, fevers or chills  EYES/ENT: No visual changes;  No vertigo or throat pain   NECK: No pain or stiffness  RESPIRATORY: No cough, wheezing, hemoptysis; No shortness of breath  CARDIOVASCULAR: No chest pain or palpitations  GASTROINTESTINAL: No abdominal or epigastric pain. No nausea, vomiting, or hematemesis; No diarrhea or constipation. No melena or hematochezia.  GENITOURINARY: No dysuria, frequency or hematuria  NEUROLOGICAL: No numbness or weakness  SKIN: No itching, rashes      ICU Vital Signs Last 24 Hrs  T(C): 36.6 (19 Sep 2020 04:00), Max: 36.8 (18 Sep 2020 08:31)  T(F): 97.9 (19 Sep 2020 04:00), Max: 98.2 (18 Sep 2020 08:31)  HR: 88 (19 Sep 2020 06:00) (80 - 100)  BP: 98/61 (19 Sep 2020 06:00) (98/61 - 128/75)  BP(mean): 74 (19 Sep 2020 06:00) (74 - 84)  ABP: 106/46 (19 Sep 2020 05:00) (100/50 - 132/47)  ABP(mean): 62 (19 Sep 2020 05:00) (60 - 80)  RR: 19 (19 Sep 2020 06:00) (16 - 23)  SpO2: 96% (19 Sep 2020 06:00) (90% - 99%)      CVP(mm Hg): --  CO: --  CI: --  PA: --  PA(mean): --  PA(direct): --  PCWP: --  LA: --  RA: --  SVR: --  SVRI: --  PVR: --  PVRI: --  I&O's Summary    18 Sep 2020 07:01  -  19 Sep 2020 07:00  --------------------------------------------------------  IN: 660 mL / OUT: 2175 mL / NET: -1515 mL        CAPILLARY BLOOD GLUCOSE    CAPILLARY BLOOD GLUCOSE      POCT Blood Glucose.: 325 mg/dL (18 Sep 2020 21:25)      PHYSICAL EXAM:  GENERAL: No acute distress, well-developed  HEAD:  Atraumatic, Normocephalic  EYES: EOMI, PERRLA, conjunctiva and sclera clear  NECK: Supple, no lymphadenopathy, no JVD  CHEST/LUNG: CTAB; No wheezes, rales, or rhonchi  HEART: Regular rate and rhythm. Normal S1/S2. No murmurs, rubs, or gallops  ABDOMEN: Soft, non-tender, non-distended; normal bowel sounds, no organomegaly  EXTREMITIES:  2+ peripheral pulses b/l, No clubbing, cyanosis, or edema  NEUROLOGY: A&O x 3, no focal deficits  SKIN: No rashes or lesions    ============================I/O===========================   I&O's Detail    18 Sep 2020 07:01  -  19 Sep 2020 07:00  --------------------------------------------------------  IN:    Oral Fluid: 660 mL  Total IN: 660 mL    OUT:    Voided (mL): 2175 mL  Total OUT: 2175 mL    Total NET: -1515 mL        ============================ LABS =========================                        11.7   10.91 )-----------( 221      ( 19 Sep 2020 05:34 )             37.9     09-19    136  |  103  |  24<H>  ----------------------------<  286<H>  4.3   |  21<L>  |  1.43<H>    Ca    9.3      19 Sep 2020 05:34  Phos  2.7     09-19  Mg     2.2     09-19    TPro  7.0  /  Alb  3.7  /  TBili  0.3  /  DBili  x   /  AST  24  /  ALT  32  /  AlkPhos  83  09-19                LIVER FUNCTIONS - ( 19 Sep 2020 05:34 )  Alb: 3.7 g/dL / Pro: 7.0 g/dL / ALK PHOS: 83 U/L / ALT: 32 U/L / AST: 24 U/L / GGT: x           PT/INR - ( 17 Sep 2020 07:58 )   PT: 13.3 sec;   INR: 1.12 ratio         PTT - ( 17 Sep 2020 07:58 )  PTT:31.8 sec  ABG - ( 18 Sep 2020 13:55 )  pH, Arterial: 7.40  pH, Blood: x     /  pCO2: 39    /  pO2: 67    / HCO3: 24    / Base Excess: -.4   /  SaO2: 89                Blood Gas Arterial, Lactate: 0.5 mmol/L (09-18-20 @ 13:55)      ======================Micro/Rad/Cardio=================  Telemtry: Reviewed   EKG: Reviewed  CXR: Reviewed  Culture: Reviewed     ======================================================  PAST MEDICAL & SURGICAL HISTORY:  H/O gastroesophageal reflux (GERD)    History of COPD    History of ischemic cardiomyopathy    Heart failure with reduced ejection fraction    Hypertension    AICD (automatic cardioverter/defibrillator) present    Diabetes    Stented coronary artery    H/O vasectomy  20 yrs ago (2000)       SEVERIANO MARTINEZ  MRN-[id number]  Patient is a 59y old  Male who presents with a chief complaint of fever, cough, emesis (19 Sep 2020 07:32)    HPI:  59M PMH T2DM, HTN, CAD/MI s/p stents (most recent 2018), HFrEF (10-15%), ICD,  ischemic cardiomyopathy, COPD, HTN, GERD pw fever, n/v, back pain x 1 day. Pt states that he was in his baseline state of health when he began to experience back pain while grocery shopping yesterday. He describes sudden onset left lower back pain, palliated by sitting upright, of sharp quality, without radiation, of 7/10 severity, which completely resolved when presenting to ED. After returning home from shopping he experienced chills but did not measure his temperature. After drinking 1 bottle of water he felt nauseous and had an episode of NBNB emesis, with resolved nausea afterward. He reports one instance of cloudy/dark urine while admitted, but denies dysuria, hematuria, change in frequency or foul odor. In addition he reports cough without sputum production. He denies HA, weakness, confusion, sinus congestion, CP, abd pain, dysuria, hematuria, diarrhea, or myalgias.     ED Course:  VS: febrile 102.9, HR , BP 63/40 to 105/62, RR 17-25, SpO2%  on RA  Labs: significant for WBC 10.28, thrombocytopenia 146, elevated BUN/SCr 32/1.88, hyperglycemic 247, elevated proBNP 1158, lactate wnl  Micro: COVID-19 negative, RVP negative, UA not concerning for infxn  Imaging: CXR: clr lungs (prelim read)  Orders: received po tylenol 975mg, po motrin 600mg, IV azithromycin 500mg, IV ceftriaxone 1000mg. Midodrine 10mg x 1 received and ordered for 10mg q8h. Started on levophed. Received 500 cc bolus of LR and 500 cc bolus of NS. Urine and Blood Cx sent, CT A/P, chest, non contrast ordered.     Pt initially admitted to MICU for management of hypotension requiring pressor support. While in ICU experienced emesis and diarrhea (without melena/hematochezia) x 1 with SOB. Then transferred to floor after short stay in MICU.    Blood cultures grew GPC's with ID following. EP saw pt to remove ICD, during procedure  ICD didn't break Vfib, pt needed external defib, wires adjusted and pt put on /Oscar and given some fluids. Coming to the CICU for monitoring and weaning pressors. Initially presenting without complaints, MAPs 80s on 2.5 , sats >96% on 1L NC. Cr 1.61, lactate .5. Patient hypervolemic and given dose Lasix with good Uoutput.     24 HOUR EVENTS:    REVIEW OF SYSTEMS:    CONSTITUTIONAL: No weakness, fevers or chills  EYES/ENT: No visual changes;  No vertigo or throat pain   NECK: No pain or stiffness  RESPIRATORY: No cough, wheezing, hemoptysis; No shortness of breath  CARDIOVASCULAR: No chest pain or palpitations  GASTROINTESTINAL: No abdominal or epigastric pain. No nausea, vomiting, or hematemesis; No diarrhea or constipation. No melena or hematochezia.  GENITOURINARY: No dysuria, frequency or hematuria  NEUROLOGICAL: No numbness or weakness  SKIN: No itching, rashes      ICU Vital Signs Last 24 Hrs  T(C): 36.6 (19 Sep 2020 04:00), Max: 36.8 (18 Sep 2020 08:31)  T(F): 97.9 (19 Sep 2020 04:00), Max: 98.2 (18 Sep 2020 08:31)  HR: 88 (19 Sep 2020 06:00) (80 - 100)  BP: 98/61 (19 Sep 2020 06:00) (98/61 - 128/75)  BP(mean): 74 (19 Sep 2020 06:00) (74 - 84)  ABP: 106/46 (19 Sep 2020 05:00) (100/50 - 132/47)  ABP(mean): 62 (19 Sep 2020 05:00) (60 - 80)  RR: 19 (19 Sep 2020 06:00) (16 - 23)  SpO2: 96% (19 Sep 2020 06:00) (90% - 99%)    I&O's Summary    18 Sep 2020 07:01  -  19 Sep 2020 07:00  --------------------------------------------------------  IN: 660 mL / OUT: 2175 mL / NET: -1515 mL        CAPILLARY BLOOD GLUCOSE      POCT Blood Glucose.: 325 mg/dL (18 Sep 2020 21:25)      PHYSICAL EXAM:  GENERAL: No acute distress, well-developed  HEAD:  Atraumatic, Normocephalic  EYES: EOMI, PERRLA, conjunctiva and sclera clear  NECK: Supple, no lymphadenopathy, no JVD  CHEST/LUNG: mild bibasilar crackles, sats mid 90s on RA, no incr wob  HEART: Regular rate and rhythm. Normal S1/S2. No murmurs, rubs, or gallops  ABDOMEN: Soft, non-tender, non-distended; normal bowel sounds, no organomegaly  EXTREMITIES:  2+ peripheral pulses b/l, No clubbing, cyanosis, or edema  NEUROLOGY: A&O x 3, no focal deficits  SKIN: No rashes or lesions    ============================I/O===========================   I&O's Detail    18 Sep 2020 07:01  -  19 Sep 2020 07:00  --------------------------------------------------------  IN:    Oral Fluid: 660 mL  Total IN: 660 mL    OUT:    Voided (mL): 2175 mL  Total OUT: 2175 mL    Total NET: -1515 mL        ============================ LABS =========================                        11.7   10.91 )-----------( 221      ( 19 Sep 2020 05:34 )             37.9     09-    136  |  103  |  24<H>  ----------------------------<  286<H>  4.3   |  21<L>  |  1.43<H>    Ca    9.3      19 Sep 2020 05:34  Phos  2.7       Mg     2.2         TPro  7.0  /  Alb  3.7  /  TBili  0.3  /  DBili  x   /  AST  24  /  ALT  32  /  AlkPhos  83                  LIVER FUNCTIONS - ( 19 Sep 2020 05:34 )  Alb: 3.7 g/dL / Pro: 7.0 g/dL / ALK PHOS: 83 U/L / ALT: 32 U/L / AST: 24 U/L / GGT: x           PT/INR - ( 17 Sep 2020 07:58 )   PT: 13.3 sec;   INR: 1.12 ratio         PTT - ( 17 Sep 2020 07:58 )  PTT:31.8 sec  ABG - ( 18 Sep 2020 13:55 )  pH, Arterial: 7.40  pH, Blood: x     /  pCO2: 39    /  pO2: 67    / HCO3: 24    / Base Excess: -.4   /  SaO2: 89                Blood Gas Arterial, Lactate: 0.5 mmol/L (20 @ 13:55)      ======================Micro/Rad/Cardio=================  Telemtry: Reviewed   EKG: Reviewed  CXR: Reviewed  Culture: Reviewed     ======================================================  PAST MEDICAL & SURGICAL HISTORY:  H/O gastroesophageal reflux (GERD)    History of COPD    History of ischemic cardiomyopathy    Heart failure with reduced ejection fraction    Hypertension    AICD (automatic cardioverter/defibrillator) present    Diabetes    Stented coronary artery    H/O vasectomy  20 yrs ago ()

## 2020-09-19 NOTE — PROGRESS NOTE ADULT - ASSESSMENT
====================ASSESSMENT AND PLAN==============      ====================CARDIOVASCULAR==================    Mechaincal Circulatory Support:  [ ] IABP   [ ] Impella 2.5   [ ] Impella CP  Settings:     ==Hemodynamics==     (Date) CVP:      PCWP:        PA S/D:            Cardiac Output:             Cardiac Index:            SVR:    Preload (fluids, diuretics):  Afterload (anti-hypertensives, pressors):  Inotropes:    ==Pump==    Echo Date:  LVEF:                              Regional Wall Motion Abnormaility?:  [ ]Yes   [ ] No, If Yes, Details  Diastolic function:  RV function:   Any change frim prior?: [ ] Yes   [ ] No, If Yes, Details:   Volume status:    ==Coronaries==    Last ischemic workup:   Antiplatelet regimen:   Anticoagulant:   Statin:   Beta blocker:    ==Rhythm==    Current rhythm:  AM EKG Interpretation:   Anti-arrhythmic therapies:   TVP with settings:     furosemide    Tablet 40 milliGRAM(s) Oral daily  metoprolol succinate ER 50 milliGRAM(s) Oral every 12 hours  sacubitril 49 mG/valsartan 51 mG 1 Tablet(s) Oral two times a day  tamsulosin 0.4 milliGRAM(s) Oral at bedtime      ====================== NEUROLOGY=====================  Sedation [ ]Yes   [ ] No  Delirium [ ]Yes   [ ] No    acetaminophen   Tablet .. 650 milliGRAM(s) Oral every 6 hours PRN Temp greater or equal to 38C (100.4F), Mild Pain (1 - 3)    ==================== RESPIRATORY======================  Mechanical Ventilation [ ]    BIPAP [ ]   HFNC [ ]   NC [ ]       ABG - ( 18 Sep 2020 13:55 )  pH, Arterial: 7.40  pH, Blood: x     /  pCO2: 39    /  pO2: 67    / HCO3: 24    / Base Excess: -.4   /  SaO2: 89                  Blood Gas Arterial - Lytes,H+H,iCa,Lact: Performed In Lab (09-18-20 @ 13:55)      ALBUTerol    90 MICROgram(s) HFA Inhaler 2 Puff(s) Inhalation every 12 hours PRN Shortness of Breath and/or Wheezing  loratadine 10 milliGRAM(s) Oral daily    ===================== RENAL =========================    09-18-20 @ 07:01  -  09-19-20 @ 07:00  --------------------------------------------------------  IN: 660 mL / OUT: 2175 mL / NET: -1515 mL      Renal Replacement Therapy:  [ ] CRRT      [ ] IHD, Last Session:    Fluid removal:     [ ] Diuretic therapy, Regimen:       ==================== GASTROINTESTINAL===================    Diet:  Last BM:   Indication for Stress Ulcer Prophylaxis, [ ] Yes    [ ] No   If Yes, Medication:     dextrose 5%. 1000 milliLiter(s) (50 mL/Hr) IV Continuous <Continuous>  pantoprazole    Tablet 40 milliGRAM(s) Oral before breakfast    ========================INFECTIOUS DISEASE================  T(C): 36.6 (09-19-20 @ 04:00), Max: 36.8 (09-18-20 @ 08:31)  WBC Count: 10.91 K/uL (09-19-20 @ 05:34)  WBC Count: 11.84 K/uL (09-18-20 @ 14:05)  WBC Count: 8.54 K/uL (09-18-20 @ 07:26)  WBC Count: 8.62 K/uL (09-17-20 @ 06:24)      Culture - Blood (collected 09-14-20 @ 10:06)  Source: .Blood Blood-Peripheral  Preliminary Report (09-15-20 @ 11:01):    No growth to date.    Culture - Blood (collected 09-14-20 @ 10:06)  Source: .Blood Blood-Peripheral  Preliminary Report (09-15-20 @ 11:01):    No growth to date.        Current Antibiotics/Antifungals with start date:     cefTRIAXone   IVPB 2000 milliGRAM(s) IV Intermittent every 24 hours    ===================HEMATOLOGIC/ONC ===================  Hemoglobin: 11.7 g/dL (09-19-20 @ 05:34)  Hemoglobin: 11.0 g/dL (09-18-20 @ 14:05)  Hemoglobin: 11.8 g/dL (09-18-20 @ 07:26)  Hemoglobin: 11.9 g/dL (09-17-20 @ 06:24)    Platelet Count - Automated: 221 K/uL (09-19-20 @ 05:34)  Platelet Count - Automated: 225 K/uL (09-18-20 @ 14:05)  Platelet Count - Automated: 239 K/uL (09-18-20 @ 07:26)  Platelet Count - Automated: 241 K/uL (09-17-20 @ 06:24)    Chemical VTE Prophylaxis:  [ ] Lovenox    [ ] SQH   [ ]NA  Systemic Anticogaulation:  [ ] Yes    [ ] No,  If Yes, Medication and Indication:     aspirin  chewable 81 milliGRAM(s) Oral daily    =======================    ENDOCRINE  =====================  Insulin drip  [ ] Yes    [ ] No  Basal Insulin [ ] Yes    [ ] No  Bolus insulin [ ] Yes    [ ] No  Sliding Scale  [ ] Yes    [ ] No  Hgb A1c    POCT Blood Glucose.: 325 mg/dL (09-18-20 @ 21:25)  POCT Blood Glucose.: 262 mg/dL (09-18-20 @ 17:48)  POCT Blood Glucose.: 260 mg/dL (09-18-20 @ 13:14)  POCT Blood Glucose.: 196 mg/dL (09-18-20 @ 08:18)        atorvastatin 80 milliGRAM(s) Oral at bedtime  dextrose 40% Gel 15 Gram(s) Oral once PRN Blood Glucose LESS THAN 70 milliGRAM(s)/deciliter  dextrose 50% Injectable 12.5 Gram(s) IV Push once  dextrose 50% Injectable 25 Gram(s) IV Push once  dextrose 50% Injectable 25 Gram(s) IV Push once  glucagon  Injectable 1 milliGRAM(s) IntraMuscular once PRN Glucose LESS THAN 70 milligrams/deciliter  insulin glargine Injectable (LANTUS) 60 Unit(s) SubCutaneous at bedtime  insulin lispro (HumaLOG) corrective regimen sliding scale   SubCutaneous three times a day before meals  insulin lispro (HumaLOG) corrective regimen sliding scale   SubCutaneous at bedtime  insulin lispro Injectable (HumaLOG) 20 Unit(s) SubCutaneous three times a day with meals    ======================= LINES/TUBES  =====================  [place]: (Date placed)  Central Line: (Date placed)  HD Catheter: (Date placed)  Arterial Line: (Date placed)  Endotracheal Tube: (Date placed)  [place]: (Date placed)   ASSESSMENT & PLAN:   59M PMH T2DM, HTN, CAD/MI s/p stents (most recent 2018), HFrEF (10-15%), ICD,  ischemic cardiomyopathy, COPD (not on home O2), HTN, GERD pw fever, n/v, back pain x 1 day. Found to be growing GPC's. S/p ICD replacement procedure, had to be externally defibrillated as ICD initially not firing, wires adjusted and working, pt put on /Oscar, in CICU monitoring.    Neuro  - AOx3, no active issues    Respiratory  - satting well on 1L NC, putting on RA will monitor  - Will continue home albuterol prn    Cardiovascular  #Septic Shock  -GPC on Bcx initially, cleared since on abx    #HFref with AICD s/p ICD replacement needing /oscar after procedure  -Off Oscar prior to arrival CICU, off  once pt came up to CICU  -c/w Entresto BID, Toprol BID, Lasix 80 qd  -c/w home ASA, plavix    GI/NUTRITION  - Tolerating diet  - Continuing home pantoprazole po 40mg    Renal  #CKD (1.4-1.8 during 2019 admission)  -Cr 1.61 after procedure today, will continue to monitor Cr/Uoutput    #BPH  - continuing home tamsulosin 0.4mg    Hematologic  -c/w ASA, Plavix    #DVT ppx  -SQH    ID  #Septic Shock growing GPC's on Bcx, since cleared Bcx's  -On Ceftriaxone 2g qd day , to get PICC prior to discharge  -Vanco for procedural ppx given    ENDOCRINE:   #IDDM  -Lantus 60 qhs, Lispro 20 with meals, ISS  -FS goal 140-180          FOR FOLLOW UP:  [ ] PICC for IV abx.

## 2020-09-19 NOTE — CHART NOTE - NSCHARTNOTEFT_GEN_A_CORE
CCU Transfer Note    Transfer to: CCU    Transfer to: (  ) Medicine    ( x ) Telemetry    (  ) RCU    (  ) Palliative    (  ) Stroke Unit    (  ) MICU    (  ) __________________    HPI / CCU COURSE:  59M PMH T2DM, HTN, CAD/MI s/p stents (most recent 2018), HFrEF (10-15%), ICD,  ischemic cardiomyopathy, COPD (not on home O2), HTN, GERD pw fever, n/v, back pain x 1 day. Denies dysuria, hematuria, change in frequency or foul odor. In addition he reports cough without sputum production. He denies HA, weakness, confusion, sinus congestion, CP, abd pain, dysuria, hematuria, diarrhea, or myalgias.     ED Course:  VS: febrile 102.9, HR , BP 63/40 to 105/62, RR 17-25, SpO2%  on RA  Labs: significant for WBC 10.28, thrombocytopenia 146, elevated BUN/SCr 32/1.88, hyperglycemic 247, elevated proBNP 1158, lactate wnl  Micro: COVID-19 negative, RVP negative, UA not concerning for infxn  Imaging: CXR: clr lungs (prelim read)  Orders: received po tylenol 975mg, po motrin 600mg, IV azithromycin 500mg, IV ceftriaxone 1000mg. Midodrine 10mg x 1 received and ordered for 10mg q8h. Started on levophed. Received 500 cc bolus of LR and 500 cc bolus of NS. Urine and Blood Cx sent, CT A/P, chest, non contrast ordered.     Pt initially admitted to MICU for management of hypotension requiring pressor support. While in ICU experienced emesis and diarrhea (without melena/hematochezia) x 1 with SOB. Then transferred to floor after short stay in MICU.    Blood cultures grew GPC's with ID following. EP saw pt to remove ICD, during procedure  ICD didn't break Vfib, pt needed external defib, wires adjusted and pt put on /Oscar and given some fluids. Coming to the CICU for monitoring and weaning pressors. Initially presenting without complaints, MAPs 80s on 2.5 , sats >96% on 1L NC. Cr 1.61, lactate .5. Patient hypervolemic and given dose Lasix with good Uoutput.           ASSESSMENT & PLAN:   59M PMH T2DM, HTN, CAD/MI s/p stents (most recent 2018), HFrEF (10-15%), ICD,  ischemic cardiomyopathy, COPD (not on home O2), HTN, GERD pw fever, n/v, back pain x 1 day. Found to be growing GPC's. S/p ICD replacement procedure, had to be externally defibrillated as ICD initially not firing, wires adjusted and working, pt put on /Oscar, in CICU monitoring.    Neuro  - AOx3, no active issues    Respiratory  - satting well on 1L NC, putting on RA will monitor  - Will continue home albuterol prn    Cardiovascular  #Septic Shock  -GPC on Bcx initially, cleared since on abx    #HFref with AICD s/p ICD replacement needing /oscar after procedure  -Off Oscar prior to arrival CICU, off  once pt came up to CICU  -c/w Entresto BID, Toprol BID, Lasix 80 qd  -c/w home ASA, plavix    GI/NUTRITION  - Tolerating diet  - Continuing home pantoprazole po 40mg    Renal  #CKD (1.4-1.8 during 2019 admission)  -Cr 1.61 after procedure today, will continue to monitor Cr/Uoutput    #BPH  - continuing home tamsulosin 0.4mg    Hematologic  -c/w ASA, Plavix    #DVT ppx  -SQH    ID  #Septic Shock growing GPC's on Bcx, since cleared Bcx's  -On Ceftriaxone 2g qd day , to get PICC prior to discharge  -Vanco for procedural ppx given    ENDOCRINE:   #IDDM  -Lantus 60 qhs, Lispro 20 with meals, ISS  -FS goal 140-180          FOR FOLLOW UP:  [ ] PICC for IV abx

## 2020-09-19 NOTE — PROGRESS NOTE ADULT - ASSESSMENT
Assessment: 59M PMH T2DM, HTN, CAD/MI s/p stents (most recent 2/2018), ICM w/ HFrEF (10-15%) s/p AICD, COPD (not on home O2), HTN, GERD, BPH, who is admitted for sepsis 2/2 gram positive bacteremia.   s/p MICU stay     #AICD Implantation s/p CCU stay   - had to be externally defibrillated as ICD initially not firing, wires adjusted and working,    # Strep Gallolyticus bacteremia  -Continue with Ceftriaxone   -MRI Lumbar spine findings consistent with Acute Osteomyelitis.    -s/p Screening colonoscopy s/p polyps removed, follow up biopsy.     # Hypotension secondary to Septic Shock  - resolved, off Midodrine   - continue furosemide, Metoprolol    Diuresing as needed    # HFrEF s/p AICD  - most recent echo from 12/2019: EF 10-15% sev global LV systolic dysfunction. eccentric LVH  - cardiology following   - resumed b-blocker but holding onto rest of HF meds.  - s/p AICD extraction in setting of Group D Strep Bacteremia       # CAD s/p stent  - c/w home DAPT (ASA off Plavix fro colonoscopy on Thursday.   -High dose statin.      GI/NUTRITION  - GI PCR negative.   - Continuing home pantoprazole po 40mg  - diabetic diet.  - GI consulted, recommend patient to be off Plavix for 5 days for colonoscopy.   -Resume Plavix tomorrow.       - MANJIT improving -   - continuing home tamsulosin 0.4mg  -renal following       Hematologic  - Monitor CBC, currently stable    #DVT ppx  - SCDs for DVT ppx    # Hyperglycemia   - Pt w/ hx of IDDM on home Lantus 60 u qhs and Humalog 20units pre meal   - Will monitor FSS with moderate ISS    Ethics  - C discussed Patient wants full code.

## 2020-09-19 NOTE — PROGRESS NOTE ADULT - NSHPATTENDINGPLANDISCUSS_GEN_ALL_CORE
resident/fellow/nurse
Riccardo Rodríguez, Shivani Rodríguez, and MICU team on rounds
Dr Blandon
Dr Blandon, Dr Navarro
MICU team
NP
resident/fellow/nurse
MICU team
NP, EPS, and Dr Blandon
resident/fellow/nurse
Med NP ([facility])
Med NP ([facility])

## 2020-09-19 NOTE — PROGRESS NOTE ADULT - SUBJECTIVE AND OBJECTIVE BOX
INTERVAL HPI/OVERNIGHT EVENTS:     Patient ambulating well, s/p colonoscopy tolerated procedure.   No complaints     T(C): 36.7 (09-19-20 @ 21:00), Max: 36.9 (09-19-20 @ 10:48)  HR: 92 (09-19-20 @ 21:00) (92 - 92)  BP: 150/84 (09-19-20 @ 21:00) (94/59 - 150/84)  RR: 17 (09-19-20 @ 21:00) (16 - 17)  SpO2: 96% (09-19-20 @ 21:00) (96% - 98%)      MEDICATIONS  (STANDING):  aspirin  chewable 81 milliGRAM(s) Oral daily  atorvastatin 80 milliGRAM(s) Oral at bedtime  cefTRIAXone   IVPB 2000 milliGRAM(s) IV Intermittent every 24 hours  chlorhexidine 4% Liquid 1 Application(s) Topical <User Schedule>  clopidogrel Tablet 75 milliGRAM(s) Oral daily  dextrose 5%. 1000 milliLiter(s) (50 mL/Hr) IV Continuous <Continuous>  dextrose 50% Injectable 12.5 Gram(s) IV Push once  dextrose 50% Injectable 25 Gram(s) IV Push once  dextrose 50% Injectable 25 Gram(s) IV Push once  furosemide    Tablet 80 milliGRAM(s) Oral daily  influenza   Vaccine 0.5 milliLiter(s) IntraMuscular once  insulin glargine Injectable (LANTUS) 60 Unit(s) SubCutaneous at bedtime  insulin lispro (HumaLOG) corrective regimen sliding scale   SubCutaneous three times a day before meals  insulin lispro (HumaLOG) corrective regimen sliding scale   SubCutaneous at bedtime  insulin lispro Injectable (HumaLOG) 20 Unit(s) SubCutaneous three times a day with meals  loratadine 10 milliGRAM(s) Oral daily  metoprolol succinate ER 50 milliGRAM(s) Oral every 12 hours  pantoprazole    Tablet 40 milliGRAM(s) Oral before breakfast  sacubitril 49 mG/valsartan 51 mG 1 Tablet(s) Oral two times a day  tamsulosin 0.4 milliGRAM(s) Oral at bedtime    MEDICATIONS  (PRN):  acetaminophen   Tablet .. 650 milliGRAM(s) Oral every 6 hours PRN Temp greater or equal to 38C (100.4F), Mild Pain (1 - 3)  ALBUTerol    90 MICROgram(s) HFA Inhaler 2 Puff(s) Inhalation every 12 hours PRN Shortness of Breath and/or Wheezing  dextrose 40% Gel 15 Gram(s) Oral once PRN Blood Glucose LESS THAN 70 milliGRAM(s)/deciliter  glucagon  Injectable 1 milliGRAM(s) IntraMuscular once PRN Glucose LESS THAN 70 milligrams/deciliter      Constitutional: NAD. well-developed; well-groomed; well-nourished;  HEENT: AT/NC, PERRLA; EOMI, MMM, no oropharyngeal lesions, no erythema, no exudates,   Respiratory: CTAB. equal aeration bilaterally. no wheezing, no crackes, no rhonchi.   Cardiovascular: RRR, no M/R/G. no JVD  Gastrointestinal: soft; NT/ND, obese, +BS, no rebounding tenderness / guarding / HSM / mass / ascites.  Extremities: no clubbing; no cyanosis; 1+ LE edema to shins, non-tender to palpation, DP and Radial pulses intact.  Skin: warm and dry; color normal: no rash: no ulcers  Neurological: A&Ox 3; responds to pain; responds to verbal commands; CN nerves grossly intact.              `                                                      11.7   10.91 )-----------( 221      ( 19 Sep 2020 05:34 )             37.9           LIVER FUNCTIONS - ( 19 Sep 2020 05:34 )  Alb: 3.7 g/dL / Pro: 7.0 g/dL / ALK PHOS: 83 U/L / ALT: 32 U/L / AST: 24 U/L / GGT: x             136|103|24<286  4.3|21|1.43  9.3,2.2,2.7  09-19 @ 05:34  137|103|28<205  4.3|22|1.50  9.3,--,--  09-18 @ 07:26  GI PCR Panel, Stool (collected 09 Sep 2020 10:21)  Source: .Stool Feces sarina garcia  Final Report (09 Sep 2020 12:58):    GI PCR Results: NOT detected    *******Please Note:*******    GI panel PCR evaluates for:    Campylobacter, Plesiomonas shigelloides, Salmonella,    Vibrio, Yersinia enterocolitica, Enteroaggregative    Escherichia coli (EAEC), Enteropathogenic E.coli (EPEC),    Enterotoxigenic E. coli (ETEC) lt/st, Shiga-like    toxin-producing E. coli (STEC) stx1/stx2,    Shigella/ Enteroinvasive E. coli (EIEC), Cryptosporidium,    Cyclospora cayetanensis, Entamoeba histolytica,    Giardia lamblia, Adenovirus F 40/41, Astrovirus,    Norovirus GI/GII, Rotavirus A, Sapovirus    Culture - Stool (09.09.20 @ 10:21)    Specimen Source: .Stool Feces  sarina jose    Culture Results:   No enteric pathogens to date: Final culture pending    Culture - Blood (09.09.20 @ 00:57)    Gram Stain:   Growth in aerobic and anaerobic bottles: Gram Positive Cocci in Pairs and  Chains    Specimen Source: .Blood Blood-Peripheral    Culture Results:   Growth in aerobic and anaerobic bottles: Streptococcus gallolyticus  See previous culture [id number]        IMAGING:      < from: CT Chest No Cont (09.09.20 @ 01:27) >  IMPRESSION:  No pneumonia.    Emphysema.    No bowel or obstruction.    Hepatomegaly and diffuse hepatic steatosis.    Splenomegaly.    Atrophic right kidney, with severe hydronephrosis. A 0.7 cm soft tissue density is noted in the interpolar region of the right kidney and is incompletely characterized. Ultrasound may be helpful for further evaluation.    < end of copied text >

## 2020-09-19 NOTE — PROGRESS NOTE ADULT - SUBJECTIVE AND OBJECTIVE BOX
24H hour events:   S/p S-ICD implant 9/18/20 w/EP MD Rodríguez. Denies chest pain, palpitations, dizziness, lightheadedness, and shortness of breath.     MEDICATIONS:  aspirin  chewable 81 milliGRAM(s) Oral daily  clopidogrel Tablet 75 milliGRAM(s) Oral daily  furosemide    Tablet 80 milliGRAM(s) Oral daily  metoprolol succinate ER 50 milliGRAM(s) Oral every 12 hours  sacubitril 49 mG/valsartan 51 mG 1 Tablet(s) Oral two times a day  tamsulosin 0.4 milliGRAM(s) Oral at bedtime  cefTRIAXone   IVPB 2000 milliGRAM(s) IV Intermittent every 24 hours  ALBUTerol    90 MICROgram(s) HFA Inhaler 2 Puff(s) Inhalation every 12 hours PRN  loratadine 10 milliGRAM(s) Oral daily  acetaminophen   Tablet .. 650 milliGRAM(s) Oral every 6 hours PRN  pantoprazole    Tablet 40 milliGRAM(s) Oral before breakfast  atorvastatin 80 milliGRAM(s) Oral at bedtime  dextrose 40% Gel 15 Gram(s) Oral once PRN  dextrose 50% Injectable 12.5 Gram(s) IV Push once  dextrose 50% Injectable 25 Gram(s) IV Push once  dextrose 50% Injectable 25 Gram(s) IV Push once  glucagon  Injectable 1 milliGRAM(s) IntraMuscular once PRN  insulin glargine Injectable (LANTUS) 60 Unit(s) SubCutaneous at bedtime  insulin lispro (HumaLOG) corrective regimen sliding scale   SubCutaneous at bedtime  insulin lispro (HumaLOG) corrective regimen sliding scale   SubCutaneous three times a day before meals  insulin lispro Injectable (HumaLOG) 20 Unit(s) SubCutaneous three times a day with meals  chlorhexidine 4% Liquid 1 Application(s) Topical <User Schedule>  dextrose 5%. 1000 milliLiter(s) IV Continuous <Continuous>  influenza   Vaccine 0.5 milliLiter(s) IntraMuscular once      REVIEW OF SYSTEMS:  Complete 10point ROS negative.    PHYSICAL EXAM:  T(C): 36.9 (09-19-20 @ 10:48), Max: 36.9 (09-19-20 @ 10:48)  HR: 92 (09-19-20 @ 10:48) (86 - 100)  BP: 94/59 (09-19-20 @ 10:48) (94/59 - 128/75)  RR: 16 (09-19-20 @ 10:48) (14 - 25)  SpO2: 98% (09-19-20 @ 10:48) (90% - 99%)  Wt(kg): --  I&O's Summary    18 Sep 2020 07:01  -  19 Sep 2020 07:00  --------------------------------------------------------  IN: 660 mL / OUT: 2325 mL / NET: -1665 mL    19 Sep 2020 07:01  -  19 Sep 2020 12:21  --------------------------------------------------------  IN: 360 mL / OUT: 350 mL / NET: 10 mL        Appearance: Normal	  Cardiovascular: Normal S1 S2, No JVD, No murmurs, No edema  Respiratory: Lungs clear to auscultation	  Psychiatry: A & O x 3, Mood & affect appropriate  Skin: Left lateral chest S-ICD incision with staples and dressing intact, mild swelling, no oozing or hematoma noted. Midsternal incision clean dry intact dsg, stable.  Extremities: Normal range of motion, No clubbing, cyanosis or edema  Vascular: Peripheral pulses palpable 2+ bilaterally        LABS:	 	                        11.7   10.91 )-----------( 221      ( 19 Sep 2020 05:34 )             37.9     09-19    136  |  103  |  24<H>  ----------------------------<  286<H>  4.3   |  21<L>  |  1.43<H>  09-18    136  |  105  |  26<H>  ----------------------------<  271<H>  4.5   |  21<L>  |  1.61<H>    Ca    9.3      19 Sep 2020 05:34  Ca    8.9      18 Sep 2020 14:05  Phos  2.7     09-19  Mg     2.2     09-19    TPro  7.0  /  Alb  3.7  /  TBili  0.3  /  DBili  x   /  AST  24  /  ALT  32  /  AlkPhos  83  09-19  TPro  6.4  /  Alb  3.5  /  TBili  0.4  /  DBili  x   /  AST  21  /  ALT  34  /  AlkPhos  79  09-18        TELEMETRY: SR 80's w/occ PVC

## 2020-09-19 NOTE — PROGRESS NOTE ADULT - SUBJECTIVE AND OBJECTIVE BOX
Chief complaint  Patient is a 59y old  Male who presents with a chief complaint of fever, cough, emesis (19 Sep 2020 08:02)   Review of systems  Patient in bed, appears comfortable.    Labs and Fingersticks  CAPILLARY BLOOD GLUCOSE      POCT Blood Glucose.: 127 mg/dL (19 Sep 2020 13:04)  POCT Blood Glucose.: 247 mg/dL (19 Sep 2020 08:16)  POCT Blood Glucose.: 325 mg/dL (18 Sep 2020 21:25)  POCT Blood Glucose.: 262 mg/dL (18 Sep 2020 17:48)      Anion Gap, Serum: 12 (09-19 @ 05:34)  Anion Gap, Serum: 10 (09-18 @ 14:05)  Anion Gap, Serum: 12 (09-18 @ 07:26)      Calcium, Total Serum: 9.3 (09-19 @ 05:34)  Calcium, Total Serum: 8.9 (09-18 @ 14:05)  Calcium, Total Serum: 9.3 (09-18 @ 07:26)  Albumin, Serum: 3.7 (09-19 @ 05:34)  Albumin, Serum: 3.5 (09-18 @ 14:05)    Alanine Aminotransferase (ALT/SGPT): 32 (09-19 @ 05:34)  Alanine Aminotransferase (ALT/SGPT): 34 (09-18 @ 14:05)  Alkaline Phosphatase, Serum: 83 (09-19 @ 05:34)  Alkaline Phosphatase, Serum: 79 (09-18 @ 14:05)  Aspartate Aminotransferase (AST/SGOT): 24 (09-19 @ 05:34)  Aspartate Aminotransferase (AST/SGOT): 21 (09-18 @ 14:05)      09-19    136  |  103  |  24<H>  ----------------------------<  286<H>  4.3   |  21<L>  |  1.43<H>    Ca    9.3      19 Sep 2020 05:34  Phos  2.7     09-19  Mg     2.2     09-19    TPro  7.0  /  Alb  3.7  /  TBili  0.3  /  DBili  x   /  AST  24  /  ALT  32  /  AlkPhos  83  09-19                        11.7   10.91 )-----------( 221      ( 19 Sep 2020 05:34 )             37.9     Medications  MEDICATIONS  (STANDING):  aspirin  chewable 81 milliGRAM(s) Oral daily  atorvastatin 80 milliGRAM(s) Oral at bedtime  cefTRIAXone   IVPB 2000 milliGRAM(s) IV Intermittent every 24 hours  chlorhexidine 4% Liquid 1 Application(s) Topical <User Schedule>  clopidogrel Tablet 75 milliGRAM(s) Oral daily  dextrose 5%. 1000 milliLiter(s) (50 mL/Hr) IV Continuous <Continuous>  dextrose 50% Injectable 12.5 Gram(s) IV Push once  dextrose 50% Injectable 25 Gram(s) IV Push once  dextrose 50% Injectable 25 Gram(s) IV Push once  furosemide    Tablet 80 milliGRAM(s) Oral daily  influenza   Vaccine 0.5 milliLiter(s) IntraMuscular once  insulin glargine Injectable (LANTUS) 60 Unit(s) SubCutaneous at bedtime  insulin lispro (HumaLOG) corrective regimen sliding scale   SubCutaneous three times a day before meals  insulin lispro (HumaLOG) corrective regimen sliding scale   SubCutaneous at bedtime  insulin lispro Injectable (HumaLOG) 20 Unit(s) SubCutaneous three times a day with meals  loratadine 10 milliGRAM(s) Oral daily  metoprolol succinate ER 50 milliGRAM(s) Oral every 12 hours  pantoprazole    Tablet 40 milliGRAM(s) Oral before breakfast  sacubitril 49 mG/valsartan 51 mG 1 Tablet(s) Oral two times a day  tamsulosin 0.4 milliGRAM(s) Oral at bedtime      Physical Exam  General: Patient comfortable in bed  Vital Signs Last 12 Hrs  T(F): 98 (09-19-20 @ 13:52), Max: 98.5 (09-19-20 @ 10:48)  HR: 92 (09-19-20 @ 13:52) (86 - 92)  BP: 136/91 (09-19-20 @ 13:52) (94/59 - 136/91)  BP(mean): 68 (09-19-20 @ 10:00) (68 - 77)  RR: 17 (09-19-20 @ 13:52) (14 - 25)  SpO2: 97% (09-19-20 @ 13:52) (94% - 98%)  Neck: No palpable thyroid nodules.  CVS: S1S2, No murmurs  Respiratory: No wheezing, no crepitations  GI: Abdomen soft, bowel sounds positive  Musculoskeletal:  edema lower extremities.     Diagnostics

## 2020-09-19 NOTE — PROGRESS NOTE ADULT - RS GEN PE MLT RESP DETAILS PC
respirations non-labored/clear to auscultation bilaterally/no wheezes
no wheezes/clear to auscultation bilaterally

## 2020-09-19 NOTE — PROGRESS NOTE ADULT - ASSESSMENT
Assessment  DMT2:  59y Male with DM T2 with hyperglycemia, A1C 11.2%, on basal bolus insulin, blood sugars improving, no hypoglycemic episodes, eating and ambulating.  Septic Shock: Hydronephrosis, On IV ABx, stable, monitored.  Obesity: No strict exercise routines, not on any weight loss program, neither on low calorie diet.            Cortney Flanagan MD  Cell: [phone]  Office: [phone]

## 2020-09-19 NOTE — PROGRESS NOTE ADULT - ASSESSMENT
ASSESSMENT/RECOMMENDATIONS:  59M PMH T2DM(a1c=11.2%), HTN, CAD/MI s/p stents (most recent 2/2018), HFrEF (10-15%), ICD, ischemic cardiomyopathy, COPD, HTN, GERD pw fever, n/v, back pain x 1 day. Pt states that he was in his baseline state of health when he began to experience back pain while grocery shopping yesterday. He describes sudden onset left lower back pain, palliated by sitting upright, of sharp quality, without radiation, of 7/10 severity, which completely resolved when presenting to ED.     VS: febrile 102.9, HR , BP 63/40 to 105/62, RR 17-25, SpO2%  on RA  Labs: significant for WBC 10.28, thrombocytopenia 146, elevated BUN/SCr 32/1.88, hyperglycemic 247, elevated proBNP 1158, lactate wnl  Micro: COVID-19 negative, RVP negative, UA 0 WBC and negative for bacteria. GI PCR negative. BCx: Strep by PCR.  CT: No pneumonia. Emphysema. No bowel or obstruction. Hepatomegaly and diffuse hepatic steatosis. Splenomegaly. Atrophic right kidney, with severe hydronephrosis. A 0.7 cm soft tissue density is noted in the interpolar region of the right kidney and is incompletely characterized. Ultrasound may be helpful for further evaluation.  Pt admitted to MICU for management of hypotension requiring pressor support. While in ICU experienced emesis and diarrhea (without melena/hematochezia) x 1 with SOB.     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    Overall bacteremia, strep infection.       Plan:   continue Rocephin at current dose.   s/p colonoscopy  MRI with possible early Osteomyelitis, no extension into epidural space, reviewed personally with neuroradiology, therefore will likely need a 6 week course of INTRAVENOUS  abs - day 11 of 42 of abx   follow up blood cultures so far NGTD  ICD lead cx NTD   Cr  remains elevated   follow ESR and CRP  s/p ICD  Ceftriaxone 2 gms IVSS daily   weekly cbc, cmp, ESR, CRP  Care per CCU team  PICC line pending

## 2020-09-20 ENCOUNTER — TRANSCRIPTION ENCOUNTER (OUTPATIENT)
Age: 60
End: 2020-09-20

## 2020-09-20 LAB
ANION GAP SERPL CALC-SCNC: 10 MMOL/L — SIGNIFICANT CHANGE UP (ref 5–17)
BUN SERPL-MCNC: 30 MG/DL — HIGH (ref 7–23)
CALCIUM SERPL-MCNC: 9.2 MG/DL — SIGNIFICANT CHANGE UP (ref 8.4–10.5)
CHLORIDE SERPL-SCNC: 104 MMOL/L — SIGNIFICANT CHANGE UP (ref 96–108)
CO2 SERPL-SCNC: 24 MMOL/L — SIGNIFICANT CHANGE UP (ref 22–31)
CREAT SERPL-MCNC: 1.56 MG/DL — HIGH (ref 0.5–1.3)
GLUCOSE BLDC GLUCOMTR-MCNC: 154 MG/DL — HIGH (ref 70–99)
GLUCOSE BLDC GLUCOMTR-MCNC: 177 MG/DL — HIGH (ref 70–99)
GLUCOSE BLDC GLUCOMTR-MCNC: 225 MG/DL — HIGH (ref 70–99)
GLUCOSE BLDC GLUCOMTR-MCNC: 246 MG/DL — HIGH (ref 70–99)
GLUCOSE BLDC GLUCOMTR-MCNC: 262 MG/DL — HIGH (ref 70–99)
GLUCOSE SERPL-MCNC: 255 MG/DL — HIGH (ref 70–99)
HCT VFR BLD CALC: 35.9 % — LOW (ref 39–50)
HGB BLD-MCNC: 11.2 G/DL — LOW (ref 13–17)
MAGNESIUM SERPL-MCNC: 2.1 MG/DL — SIGNIFICANT CHANGE UP (ref 1.6–2.6)
MCHC RBC-ENTMCNC: 26 PG — LOW (ref 27–34)
MCHC RBC-ENTMCNC: 31.2 GM/DL — LOW (ref 32–36)
MCV RBC AUTO: 83.5 FL — SIGNIFICANT CHANGE UP (ref 80–100)
NRBC # BLD: 0 /100 WBCS — SIGNIFICANT CHANGE UP (ref 0–0)
PHOSPHATE SERPL-MCNC: 2.8 MG/DL — SIGNIFICANT CHANGE UP (ref 2.5–4.5)
PLATELET # BLD AUTO: 197 K/UL — SIGNIFICANT CHANGE UP (ref 150–400)
POTASSIUM SERPL-MCNC: 4 MMOL/L — SIGNIFICANT CHANGE UP (ref 3.5–5.3)
POTASSIUM SERPL-SCNC: 4 MMOL/L — SIGNIFICANT CHANGE UP (ref 3.5–5.3)
RBC # BLD: 4.3 M/UL — SIGNIFICANT CHANGE UP (ref 4.2–5.8)
RBC # FLD: 15.8 % — HIGH (ref 10.3–14.5)
SODIUM SERPL-SCNC: 138 MMOL/L — SIGNIFICANT CHANGE UP (ref 135–145)
WBC # BLD: 9.24 K/UL — SIGNIFICANT CHANGE UP (ref 3.8–10.5)
WBC # FLD AUTO: 9.24 K/UL — SIGNIFICANT CHANGE UP (ref 3.8–10.5)

## 2020-09-20 RX ORDER — CEFTRIAXONE 500 MG/1
2 INJECTION, POWDER, FOR SOLUTION INTRAMUSCULAR; INTRAVENOUS
Qty: 66 | Refills: 0
Start: 2020-09-20 | End: 2020-10-22

## 2020-09-20 RX ORDER — SODIUM CHLORIDE 9 MG/ML
10 INJECTION INTRAMUSCULAR; INTRAVENOUS; SUBCUTANEOUS
Refills: 0 | Status: DISCONTINUED | OUTPATIENT
Start: 2020-09-20 | End: 2020-09-21

## 2020-09-20 RX ADMIN — PANTOPRAZOLE SODIUM 40 MILLIGRAM(S): 20 TABLET, DELAYED RELEASE ORAL at 05:50

## 2020-09-20 RX ADMIN — Medication 80 MILLIGRAM(S): at 05:50

## 2020-09-20 RX ADMIN — Medication 81 MILLIGRAM(S): at 13:13

## 2020-09-20 RX ADMIN — LORATADINE 10 MILLIGRAM(S): 10 TABLET ORAL at 13:13

## 2020-09-20 RX ADMIN — SACUBITRIL AND VALSARTAN 1 TABLET(S): 24; 26 TABLET, FILM COATED ORAL at 22:42

## 2020-09-20 RX ADMIN — Medication 50 MILLIGRAM(S): at 13:13

## 2020-09-20 RX ADMIN — ATORVASTATIN CALCIUM 80 MILLIGRAM(S): 80 TABLET, FILM COATED ORAL at 22:42

## 2020-09-20 RX ADMIN — INSULIN GLARGINE 60 UNIT(S): 100 INJECTION, SOLUTION SUBCUTANEOUS at 22:43

## 2020-09-20 RX ADMIN — Medication 2: at 17:19

## 2020-09-20 RX ADMIN — CHLORHEXIDINE GLUCONATE 1 APPLICATION(S): 213 SOLUTION TOPICAL at 08:17

## 2020-09-20 RX ADMIN — Medication 6: at 08:08

## 2020-09-20 RX ADMIN — Medication 20 UNIT(S): at 13:23

## 2020-09-20 RX ADMIN — CLOPIDOGREL BISULFATE 75 MILLIGRAM(S): 75 TABLET, FILM COATED ORAL at 13:13

## 2020-09-20 RX ADMIN — SACUBITRIL AND VALSARTAN 1 TABLET(S): 24; 26 TABLET, FILM COATED ORAL at 08:17

## 2020-09-20 RX ADMIN — Medication 20 UNIT(S): at 08:08

## 2020-09-20 RX ADMIN — TAMSULOSIN HYDROCHLORIDE 0.4 MILLIGRAM(S): 0.4 CAPSULE ORAL at 22:42

## 2020-09-20 RX ADMIN — Medication 20 UNIT(S): at 17:19

## 2020-09-20 RX ADMIN — CEFTRIAXONE 100 MILLIGRAM(S): 500 INJECTION, POWDER, FOR SOLUTION INTRAMUSCULAR; INTRAVENOUS at 22:42

## 2020-09-20 RX ADMIN — Medication 4: at 13:23

## 2020-09-20 NOTE — DISCHARGE NOTE PROVIDER - PROVIDER TOKENS
PROVIDER:[TOKEN:[[id number]],FOLLOWUP:[1 week]] PROVIDER:[TOKEN:[[id number]],FOLLOWUP:[1 week]],PROVIDER:[TOKEN:[[id number]],FOLLOWUP:[2 weeks]]

## 2020-09-20 NOTE — DISCHARGE NOTE PROVIDER - NSDCFUADDAPPT_GEN_ALL_CORE_FT
Please follow up with your PCP in a week for further management  Please follow up with Renal, Dr. Cain for further management for your kidney mass and Right side of hydronephrosis   Please follow up with EP, on 9/28/20 13:40 for wound check for ICD Please follow up with your PCP in a week for further management  Please follow up with Renal, Dr. Cain for further management for your kidney mass and Right side of hydronephrosis   Please follow up with EP, on 9/28/20 13:40 for wound check for ICD  Please follow up with Endo in 2 weeks for your diabetes management

## 2020-09-20 NOTE — DISCHARGE NOTE PROVIDER - NSDCMRMEDTOKEN_GEN_ALL_CORE_FT
albuterol 90 mcg/inh inhalation aerosol: 2 puff(s) inhaled 2 times a day  Allegra 180 mg oral tablet: 1 tab(s) orally once a day  aspirin 81 mg oral tablet, chewable: 1 tab(s) orally once a day  cbc, cmp, ESR, CRP weekly while on antibiotic:   cefTRIAXone 2 g intravenous injection: 2 gram(s) intravenous every 24 hours till 10/21( total 6 weeks from 9/10)  clopidogrel 75 mg oral tablet: 1 tab(s) orally once a day  Entresto 49 mg-51 mg oral tablet: 1 tab(s) orally 2 times a day  furosemide 80 mg oral tablet: 1 tab(s) orally once a day  HumaLOG 100 units/mL subcutaneous solution: 50 unit(s) subcutaneous  indapamide 1.25 mg oral tablet: 1 tab(s) orally once a day  Lantus 100 units/mL subcutaneous solution: 74 unit(s) subcutaneous once a day (at bedtime)  Metoprolol Succinate  mg oral tablet, extended release: 1 tab(s) orally once a day  Opcon-A 0.027%-0.315% ophthalmic solution: to each affected eye prn  pantoprazole 40 mg oral delayed release tablet: 1 tab(s) orally once a day  rosuvastatin 20 mg oral tablet: 1 tab(s) orally once a day  tamsulosin 0.4 mg oral capsule: 1 cap(s) orally once a day   albuterol 90 mcg/inh inhalation aerosol: 2 puff(s) inhaled 2 times a day  Allegra 180 mg oral tablet: 1 tab(s) orally once a day  aspirin 81 mg oral tablet, chewable: 1 tab(s) orally once a day  atorvastatin 80 mg oral tablet: 1 tab(s) orally once a day (at bedtime)  cbc, cmp, ESR, CRP weekly while on antibiotic:   cefTRIAXone 2 g intravenous injection: 2 gram(s) intravenous every 24 hours till 10/21( total 6 weeks from 9/10)  clopidogrel 75 mg oral tablet: 1 tab(s) orally once a day  Entresto 49 mg-51 mg oral tablet: 1 tab(s) orally 2 times a day  furosemide 80 mg oral tablet: 1 tab(s) orally once a day  HumaLOG 100 units/mL subcutaneous solution: 20 unit(s) subcutaneous three times per day before meals  Lantus 100 units/mL subcutaneous solution: 68 unit(s) subcutaneous once a day (at bedtime)  metoprolol succinate 50 mg oral tablet, extended release: 1 tab(s) orally every 12 hours  Opcon-A 0.027%-0.315% ophthalmic solution: to each affected eye prn  pantoprazole 40 mg oral delayed release tablet: 1 tab(s) orally once a day  tamsulosin 0.4 mg oral capsule: 1 cap(s) orally once a day

## 2020-09-20 NOTE — DISCHARGE NOTE PROVIDER - INSTRUCTIONS
Please continue a healthy and balanced diet. Please keep your intake of carbohydrates (breads, pasta, rice) consistent. We recommend healthy or generous servings of fruits/vegetables (high-fiber containing foods)

## 2020-09-20 NOTE — PHARMACOTHERAPY INTERVENTION NOTE - COMMENTS
Spoke with Mr. Jessica about his discharge medications. He has a PICC line placed and will be receiving Ceftriaxone 2 g IV Q24 for a total course of 6 weeks of therapy (ending 10/21). Spoke about clopidogrel and reviewed dosing, adherence, side effects and when to contact a medical provider. Provided the patient with a medication information packet from [facility].    Pau Brady, PharmD  PGY-1 Pharmacy Resident  x [phone]

## 2020-09-20 NOTE — PROGRESS NOTE ADULT - ASSESSMENT
Assessment: 59M PMH T2DM, HTN, CAD/MI s/p stents (most recent 2/2018), ICM w/ HFrEF (10-15%) s/p AICD, COPD (not on home O2), HTN, GERD, BPH, who is admitted for sepsis 2/2 gram positive bacteremia.   s/p MICU stay     #AICD Implantation s/p CCU stay   - had to be externally defibrillated as ICD initially not firing, wires adjusted and working,  -Patient doing well.   D/C planning am tomorrow.     # Strep Gallolyticus bacteremia  -Continue with Ceftriaxone   -MRI Lumbar spine findings consistent with Acute Osteomyelitis.    -s/p Screening colonoscopy s/p polyps removed, follow up biopsy.     # Hypotension secondary to Septic Shock  - resolved, off Midodrine   - continue furosemide, Metoprolol    Diuresing as needed    # HFrEF s/p AICD  - most recent echo from 12/2019: EF 10-15% sev global LV systolic dysfunction. eccentric LVH  - cardiology following   - resumed b-blocker but holding onto rest of HF meds.  - s/p AICD extraction in setting of Group D Strep Bacteremia       # CAD s/p stent  - c/w home DAPT (ASA off Plavix fro colonoscopy on Thursday.   -High dose statin.      GI/NUTRITION  - GI PCR negative.   - Continuing home pantoprazole po 40mg  - diabetic diet.  - GI consulted, recommend patient to be off Plavix for 5 days for colonoscopy.   -Resume Plavix     - MANJIT improving -   - continuing home tamsulosin 0.4mg  -renal following       Hematologic  - Monitor CBC, currently stable    #DVT ppx  - SCDs for DVT ppx    # Hyperglycemia   - Pt w/ hx of IDDM on home Lantus 60 u qhs and Humalog 20units pre meal   - Will monitor FSS with moderate ISS    Ethics  - [facility] discussed Patient wants full code.

## 2020-09-20 NOTE — DISCHARGE NOTE PROVIDER - HOSPITAL COURSE
59M PMH T2DM, HTN, CAD/MI s/p stents (most recent 2/2018), HFrEF (10-15%), ICD,  ischemic cardiomyopathy, COPD (not on home O2), HTN, GERD pw fever, n/v, back pain x 1 day. Found to be growing GPC's. S/p ICD replacement procedure, had to be externally defibrillated as ICD initially not firing, wires adjusted and working/Pt initially admitted to MICU for management of hypotension requiring pressor support. While in ICU experienced emesis and diarrhea (without melena/hematochezia) x 1 with SOB. Then transferred to floor after short stay in MICU. Repeated blood culture now negative and s/p PICC for total of 6 weeks of ABx as per ID. Incidental finding of kidney lesion was addressed with Renal. s/p Renal US ___________________________________    Patient remains stable for DC with close follow up with PCP, EP, ID as per Dr. Sow. 59M PMH T2DM, HTN, CAD/MI s/p stents (most recent 2/2018), HFrEF (10-15%), ICD,  ischemic cardiomyopathy, COPD (not on home O2), HTN, GERD pw fever, n/v, back pain x 1 day. Found to be growing GPC's. S/p ICD replacement procedure, had to be externally defibrillated as ICD initially not firing, wires adjusted and working/Pt initially admitted to MICU for management of hypotension requiring pressor support. While in ICU experienced emesis and diarrhea (without melena/hematochezia) x 1 with SOB. Then transferred to floor after short stay in MICU. Repeated blood culture now negative and s/p PICC for total of 6 weeks of ABx as per ID. Incidental finding of kidney lesion was addressed with Renal. s/p Renal US with mass consistent with a cyst, and severe R hydro. Per Renal, R hydro is chronic and appears to be stable. To be further worked up as opt. Patient remains stable for DC with close follow up with PCP, EP, ID as per Dr. Sow.

## 2020-09-20 NOTE — DISCHARGE NOTE PROVIDER - CARE PROVIDER_API CALL
RYAN KENNEDY  Internal Medicine  [address] [place] [address], [place] [address]  Phone: [phone]  Fax: [phone]  Follow Up Time: 1 week   RYAN KENNEDY  Internal Medicine  [address] [facility], [address]  [place] [address]  Phone: [phone]  Fax: [phone]  Follow Up Time: 1 week    Yann Cain  NEPHROLOGY  [address] [place] [address]  Phone: [phone]  Fax: [phone]  Follow Up Time: 2 weeks

## 2020-09-20 NOTE — PROGRESS NOTE ADULT - SUBJECTIVE AND OBJECTIVE BOX
Chief complaint  Patient is a 59y old  Male who presents with a chief complaint of fever, cough, emesis (20 Sep 2020 14:16)   Review of systems  Patient in bed, appears comfortable.    Labs and Fingersticks  CAPILLARY BLOOD GLUCOSE      POCT Blood Glucose.: 154 mg/dL (20 Sep 2020 21:39)  POCT Blood Glucose.: 177 mg/dL (20 Sep 2020 17:03)  POCT Blood Glucose.: 225 mg/dL (20 Sep 2020 13:22)  POCT Blood Glucose.: 246 mg/dL (20 Sep 2020 12:05)  POCT Blood Glucose.: 262 mg/dL (20 Sep 2020 07:59)      Anion Gap, Serum: 10 (09-20 @ 05:19)  Anion Gap, Serum: 12 (09-19 @ 05:34)      Calcium, Total Serum: 9.2 (09-20 @ 05:19)  Calcium, Total Serum: 9.3 (09-19 @ 05:34)  Albumin, Serum: 3.7 (09-19 @ 05:34)    Alanine Aminotransferase (ALT/SGPT): 32 (09-19 @ 05:34)  Alkaline Phosphatase, Serum: 83 (09-19 @ 05:34)  Aspartate Aminotransferase (AST/SGOT): 24 (09-19 @ 05:34)        09-20    138  |  104  |  30<H>  ----------------------------<  255<H>  4.0   |  24  |  1.56<H>    Ca    9.2      20 Sep 2020 05:19  Phos  2.8     09-20  Mg     2.1     09-20    TPro  7.0  /  Alb  3.7  /  TBili  0.3  /  DBili  x   /  AST  24  /  ALT  32  /  AlkPhos  83  09-19                        11.2   9.24  )-----------( 197      ( 20 Sep 2020 05:19 )             35.9     Medications  MEDICATIONS  (STANDING):  aspirin  chewable 81 milliGRAM(s) Oral daily  atorvastatin 80 milliGRAM(s) Oral at bedtime  cefTRIAXone   IVPB 2000 milliGRAM(s) IV Intermittent every 24 hours  chlorhexidine 4% Liquid 1 Application(s) Topical <User Schedule>  clopidogrel Tablet 75 milliGRAM(s) Oral daily  dextrose 5%. 1000 milliLiter(s) (50 mL/Hr) IV Continuous <Continuous>  dextrose 50% Injectable 12.5 Gram(s) IV Push once  dextrose 50% Injectable 25 Gram(s) IV Push once  dextrose 50% Injectable 25 Gram(s) IV Push once  furosemide    Tablet 80 milliGRAM(s) Oral daily  influenza   Vaccine 0.5 milliLiter(s) IntraMuscular once  insulin glargine Injectable (LANTUS) 60 Unit(s) SubCutaneous at bedtime  insulin lispro (HumaLOG) corrective regimen sliding scale   SubCutaneous three times a day before meals  insulin lispro (HumaLOG) corrective regimen sliding scale   SubCutaneous at bedtime  insulin lispro Injectable (HumaLOG) 20 Unit(s) SubCutaneous three times a day with meals  loratadine 10 milliGRAM(s) Oral daily  metoprolol succinate ER 50 milliGRAM(s) Oral every 12 hours  pantoprazole    Tablet 40 milliGRAM(s) Oral before breakfast  sacubitril 49 mG/valsartan 51 mG 1 Tablet(s) Oral two times a day  tamsulosin 0.4 milliGRAM(s) Oral at bedtime      Physical Exam  General: Patient comfortable in bed  Vital Signs Last 12 Hrs  T(F): 98.2 (09-20-20 @ 13:50), Max: 98.2 (09-20-20 @ 13:50)  HR: 99 (09-20-20 @ 13:50) (98 - 99)  BP: 138/100 (09-20-20 @ 13:50) (138/100 - 138/100)  BP(mean): --  RR: 17 (09-20-20 @ 13:50) (17 - 18)  SpO2: 98% (09-20-20 @ 13:50) (98% - 98%)  Neck: No palpable thyroid nodules.  CVS: S1S2, No murmurs  Respiratory: No wheezing, no crepitations  GI: Abdomen soft, bowel sounds positive  Musculoskeletal:  edema lower extremities.     Diagnostics

## 2020-09-20 NOTE — PROGRESS NOTE ADULT - ASSESSMENT
Assessment  DMT2:  59y Male with DM T2 with hyperglycemia, A1C 11.2%, on basal bolus insulin, blood sugars in acceptable range, no hypoglycemic episodes, eating and ambulating.  Septic Shock: Hydronephrosis, On IV ABx, stable, monitored.  Obesity: No strict exercise routines, not on any weight loss program, neither on low calorie diet.            Cortney Flanagan MD  Cell: [phone]  Office: [phone]

## 2020-09-20 NOTE — PROGRESS NOTE ADULT - SUBJECTIVE AND OBJECTIVE BOX
INTERVAL HPI/OVERNIGHT EVENTS:     Patient ambulating well, s/p colonoscopy tolerated procedure.   No complaints                  T(C): 36.8 (09-20-20 @ 13:50), Max: 36.8 (09-20-20 @ 13:50)  HR: 99 (09-20-20 @ 13:50) (98 - 99)  BP: 138/100 (09-20-20 @ 13:50) (138/100 - 138/100)  RR: 17 (09-20-20 @ 13:50) (17 - 18)  SpO2: 98% (09-20-20 @ 13:50) (98% - 98%)      Constitutional: NAD. well-developed; well-groomed; well-nourished;  HEENT: AT/NC, PERRLA; EOMI, MMM, no oropharyngeal lesions, no erythema, no exudates,   Respiratory: CTAB. equal aeration bilaterally. no wheezing, no crackes, no rhonchi.   Cardiovascular: RRR, no M/R/G. no JVD  Gastrointestinal: soft; NT/ND, obese, +BS, no rebounding tenderness / guarding / HSM / mass / ascites.  Extremities: no clubbing; no cyanosis; 1+ LE edema to shins, non-tender to palpation, DP and Radial pulses intact.  Skin: warm and dry; color normal: no rash: no ulcers  Neurological: A&Ox 3; responds to pain; responds to verbal commands; CN nerves grossly intact.                               11.2   9.24  )-----------( 197      ( 20 Sep 2020 05:19 )             35.9           LIVER FUNCTIONS - ( 19 Sep 2020 05:34 )  Alb: 3.7 g/dL / Pro: 7.0 g/dL / ALK PHOS: 83 U/L / ALT: 32 U/L / AST: 24 U/L / GGT: x             138|104|30<255  4.0|24|1.56  9.2,2.1,2.8  09-20 @ 05:19  GI PCR Panel, Stool (collected 09 Sep 2020 10:21)  Source: .Stool Feces sarina garcia  Final Report (09 Sep 2020 12:58):    GI PCR Results: NOT detected    *******Please Note:*******    GI panel PCR evaluates for:    Campylobacter, Plesiomonas shigelloides, Salmonella,    Vibrio, Yersinia enterocolitica, Enteroaggregative    Escherichia coli (EAEC), Enteropathogenic E.coli (EPEC),    Enterotoxigenic E. coli (ETEC) lt/st, Shiga-like    toxin-producing E. coli (STEC) stx1/stx2,    Shigella/ Enteroinvasive E. coli (EIEC), Cryptosporidium,    Cyclospora cayetanensis, Entamoeba histolytica,    Giardia lamblia, Adenovirus F 40/41, Astrovirus,    Norovirus GI/GII, Rotavirus A, Sapovirus    Culture - Stool (09.09.20 @ 10:21)    Specimen Source: .Stool Feces  sarina jose    Culture Results:   No enteric pathogens to date: Final culture pending    Culture - Blood (09.09.20 @ 00:57)    Gram Stain:   Growth in aerobic and anaerobic bottles: Gram Positive Cocci in Pairs and  Chains    Specimen Source: .Blood Blood-Peripheral    Culture Results:   Growth in aerobic and anaerobic bottles: Streptococcus gallolyticus  See previous culture [id number]        IMAGING:      < from: CT Chest No Cont (09.09.20 @ 01:27) >  IMPRESSION:  No pneumonia.    Emphysema.    No bowel or obstruction.    Hepatomegaly and diffuse hepatic steatosis.    Splenomegaly.    Atrophic right kidney, with severe hydronephrosis. A 0.7 cm soft tissue density is noted in the interpolar region of the right kidney and is incompletely characterized. Ultrasound may be helpful for further evaluation.    < end of copied text >

## 2020-09-20 NOTE — DISCHARGE NOTE PROVIDER - NSDCCPCAREPLAN_GEN_ALL_CORE_FT
PRINCIPAL DISCHARGE DIAGNOSIS  Diagnosis: Sepsis  Assessment and Plan of Treatment: Take all antibiotics as ordered.  Call you Health care provider upon arrival home to make a one week follow up appointment.  If you develop fever, chills, malaise, or change in mental status call your Health Care Provider or go to the Emergency Department.  Nutrition is important, eat small frequent meals to help ensure you get adequate calories.  Do not stay in bed all day!  Increase your activity daily as tolerated.        SECONDARY DISCHARGE DIAGNOSES  Diagnosis: MANJIT (acute kidney injury)  Assessment and Plan of Treatment: Resolving  Avoid taking (NSAIDs) - (ex: Ibuprofen, Advil, Celebrex, Naprosyn)  Avoid taking any nephrotoxic agents (can harm kidneys) - Intravenous contrast for diagnostic testing, combination cold medications.  Have all medications adjusted for your renal function by your Health Care Provider.  Blood pressure control is important.  Take all medication as prescribed.      Diagnosis: Mass of right kidney  Assessment and Plan of Treatment: Kidney mass seems consistent with a cyst.  Right side of kidney has severe hydronephrosis. Please follow up with Renal in 2 weeks for further management    Diagnosis: Diabetes  Assessment and Plan of Treatment: Make sure you get your HgA1c checked every three months.  If you take oral diabetes medications, check your blood glucose two times a day.  If you take insulin, check your blood glucose before meals and at bedtime.  It's important not to skip any meals.  Keep a log of your blood glucose results and always take it with you to your doctor appointments.  Keep a list of your current medications including injectables and over the counter medications and bring this medication list with you to all your doctor appointments.  If you have not seen your opthalmologist this year call for appointment.  Check your feet daily for redness, sores, or openings. Do not self treat. If no improvement in two days call your primary care physician for an appointment.  Low blood sugar (hypoglycemia) is a blood sugar below 70mg/dl. Check your blood sugar if you feel signs/symptoms of hypoglycemia. If your blood sugar is below 70 take 15 grams of carbohydrates (ex 4 oz of apple juice, 3-4 glucosr tablets, or 4-6 oz of regular soda) wait 15 minutes and repeat blood sugar to make sure it comes up above 70.  If your blood sugar is above 70 and you are due for a meal, have a meal.  If you are not due for a meal have a snack.  This snack helps keeps your blood sugar at a safe range.

## 2020-09-21 ENCOUNTER — TRANSCRIPTION ENCOUNTER (OUTPATIENT)
Age: 60
End: 2020-09-21

## 2020-09-21 VITALS
TEMPERATURE: 98 F | DIASTOLIC BLOOD PRESSURE: 74 MMHG | OXYGEN SATURATION: 98 % | RESPIRATION RATE: 18 BRPM | HEART RATE: 84 BPM | SYSTOLIC BLOOD PRESSURE: 115 MMHG

## 2020-09-21 LAB
ANION GAP SERPL CALC-SCNC: 12 MMOL/L — SIGNIFICANT CHANGE UP (ref 5–17)
BUN SERPL-MCNC: 30 MG/DL — HIGH (ref 7–23)
CALCIUM SERPL-MCNC: 9.6 MG/DL — SIGNIFICANT CHANGE UP (ref 8.4–10.5)
CHLORIDE SERPL-SCNC: 104 MMOL/L — SIGNIFICANT CHANGE UP (ref 96–108)
CO2 SERPL-SCNC: 24 MMOL/L — SIGNIFICANT CHANGE UP (ref 22–31)
CREAT SERPL-MCNC: 1.49 MG/DL — HIGH (ref 0.5–1.3)
GLUCOSE BLDC GLUCOMTR-MCNC: 128 MG/DL — HIGH (ref 70–99)
GLUCOSE BLDC GLUCOMTR-MCNC: 220 MG/DL — HIGH (ref 70–99)
GLUCOSE BLDC GLUCOMTR-MCNC: 247 MG/DL — HIGH (ref 70–99)
GLUCOSE SERPL-MCNC: 230 MG/DL — HIGH (ref 70–99)
HCT VFR BLD CALC: 35.1 % — LOW (ref 39–50)
HGB BLD-MCNC: 11 G/DL — LOW (ref 13–17)
MAGNESIUM SERPL-MCNC: 2 MG/DL — SIGNIFICANT CHANGE UP (ref 1.6–2.6)
MCHC RBC-ENTMCNC: 26.1 PG — LOW (ref 27–34)
MCHC RBC-ENTMCNC: 31.3 GM/DL — LOW (ref 32–36)
MCV RBC AUTO: 83.2 FL — SIGNIFICANT CHANGE UP (ref 80–100)
NRBC # BLD: 0 /100 WBCS — SIGNIFICANT CHANGE UP (ref 0–0)
PLATELET # BLD AUTO: 217 K/UL — SIGNIFICANT CHANGE UP (ref 150–400)
POTASSIUM SERPL-MCNC: 3.9 MMOL/L — SIGNIFICANT CHANGE UP (ref 3.5–5.3)
POTASSIUM SERPL-SCNC: 3.9 MMOL/L — SIGNIFICANT CHANGE UP (ref 3.5–5.3)
RBC # BLD: 4.22 M/UL — SIGNIFICANT CHANGE UP (ref 4.2–5.8)
RBC # FLD: 15.9 % — HIGH (ref 10.3–14.5)
SODIUM SERPL-SCNC: 140 MMOL/L — SIGNIFICANT CHANGE UP (ref 135–145)
WBC # BLD: 8.35 K/UL — SIGNIFICANT CHANGE UP (ref 3.8–10.5)
WBC # FLD AUTO: 8.35 K/UL — SIGNIFICANT CHANGE UP (ref 3.8–10.5)

## 2020-09-21 PROCEDURE — 83605 ASSAY OF LACTIC ACID: CPT

## 2020-09-21 PROCEDURE — 87150 DNA/RNA AMPLIFIED PROBE: CPT

## 2020-09-21 PROCEDURE — U0003: CPT

## 2020-09-21 PROCEDURE — 86850 RBC ANTIBODY SCREEN: CPT

## 2020-09-21 PROCEDURE — 71250 CT THORAX DX C-: CPT

## 2020-09-21 PROCEDURE — 87086 URINE CULTURE/COLONY COUNT: CPT

## 2020-09-21 PROCEDURE — 86923 COMPATIBILITY TEST ELECTRIC: CPT

## 2020-09-21 PROCEDURE — 33270 INS/REP SUBQ DEFIBRILLATOR: CPT

## 2020-09-21 PROCEDURE — 82947 ASSAY GLUCOSE BLOOD QUANT: CPT

## 2020-09-21 PROCEDURE — 87507 IADNA-DNA/RNA PROBE TQ 12-25: CPT

## 2020-09-21 PROCEDURE — C1769: CPT

## 2020-09-21 PROCEDURE — 85025 COMPLETE CBC W/AUTO DIFF WBC: CPT

## 2020-09-21 PROCEDURE — 85610 PROTHROMBIN TIME: CPT

## 2020-09-21 PROCEDURE — 72158 MRI LUMBAR SPINE W/O & W/DYE: CPT

## 2020-09-21 PROCEDURE — P9047: CPT

## 2020-09-21 PROCEDURE — 86900 BLOOD TYPING SEROLOGIC ABO: CPT

## 2020-09-21 PROCEDURE — 80053 COMPREHEN METABOLIC PANEL: CPT

## 2020-09-21 PROCEDURE — 85018 HEMOGLOBIN: CPT

## 2020-09-21 PROCEDURE — 96374 THER/PROPH/DIAG INJ IV PUSH: CPT | Mod: XU

## 2020-09-21 PROCEDURE — 96375 TX/PRO/DX INJ NEW DRUG ADDON: CPT | Mod: XU

## 2020-09-21 PROCEDURE — 82330 ASSAY OF CALCIUM: CPT

## 2020-09-21 PROCEDURE — 85027 COMPLETE CBC AUTOMATED: CPT

## 2020-09-21 PROCEDURE — 83735 ASSAY OF MAGNESIUM: CPT

## 2020-09-21 PROCEDURE — 0225U NFCT DS DNA&RNA 21 SARSCOV2: CPT

## 2020-09-21 PROCEDURE — 87184 SC STD DISK METHOD PER PLATE: CPT

## 2020-09-21 PROCEDURE — 82435 ASSAY OF BLOOD CHLORIDE: CPT

## 2020-09-21 PROCEDURE — C1896: CPT

## 2020-09-21 PROCEDURE — 81001 URINALYSIS AUTO W/SCOPE: CPT

## 2020-09-21 PROCEDURE — 90686 IIV4 VACC NO PRSV 0.5 ML IM: CPT

## 2020-09-21 PROCEDURE — 93005 ELECTROCARDIOGRAM TRACING: CPT | Mod: XU

## 2020-09-21 PROCEDURE — 87075 CULTR BACTERIA EXCEPT BLOOD: CPT

## 2020-09-21 PROCEDURE — 83036 HEMOGLOBIN GLYCOSYLATED A1C: CPT

## 2020-09-21 PROCEDURE — 99239 HOSP IP/OBS DSCHRG MGMT >30: CPT

## 2020-09-21 PROCEDURE — 94640 AIRWAY INHALATION TREATMENT: CPT

## 2020-09-21 PROCEDURE — A9585: CPT

## 2020-09-21 PROCEDURE — 99232 SBSQ HOSP IP/OBS MODERATE 35: CPT

## 2020-09-21 PROCEDURE — C1894: CPT

## 2020-09-21 PROCEDURE — 76775 US EXAM ABDO BACK WALL LIM: CPT | Mod: 26

## 2020-09-21 PROCEDURE — 86901 BLOOD TYPING SEROLOGIC RH(D): CPT

## 2020-09-21 PROCEDURE — 87046 STOOL CULTR AEROBIC BACT EA: CPT

## 2020-09-21 PROCEDURE — 76775 US EXAM ABDO BACK WALL LIM: CPT

## 2020-09-21 PROCEDURE — 80048 BASIC METABOLIC PNL TOTAL CA: CPT

## 2020-09-21 PROCEDURE — P9016: CPT

## 2020-09-21 PROCEDURE — C1751: CPT

## 2020-09-21 PROCEDURE — 84443 ASSAY THYROID STIM HORMONE: CPT

## 2020-09-21 PROCEDURE — C8929: CPT

## 2020-09-21 PROCEDURE — 71046 X-RAY EXAM CHEST 2 VIEWS: CPT

## 2020-09-21 PROCEDURE — 86769 SARS-COV-2 COVID-19 ANTIBODY: CPT

## 2020-09-21 PROCEDURE — C1889: CPT

## 2020-09-21 PROCEDURE — 36569 INSJ PICC 5 YR+ W/O IMAGING: CPT

## 2020-09-21 PROCEDURE — 87449 NOS EACH ORGANISM AG IA: CPT

## 2020-09-21 PROCEDURE — 83880 ASSAY OF NATRIURETIC PEPTIDE: CPT

## 2020-09-21 PROCEDURE — C2629: CPT

## 2020-09-21 PROCEDURE — 84100 ASSAY OF PHOSPHORUS: CPT

## 2020-09-21 PROCEDURE — 99285 EMERGENCY DEPT VISIT HI MDM: CPT | Mod: 25

## 2020-09-21 PROCEDURE — 82803 BLOOD GASES ANY COMBINATION: CPT

## 2020-09-21 PROCEDURE — 88305 TISSUE EXAM BY PATHOLOGIST: CPT

## 2020-09-21 PROCEDURE — 82962 GLUCOSE BLOOD TEST: CPT

## 2020-09-21 PROCEDURE — 74176 CT ABD & PELVIS W/O CONTRAST: CPT

## 2020-09-21 PROCEDURE — 71045 X-RAY EXAM CHEST 1 VIEW: CPT

## 2020-09-21 PROCEDURE — 76000 FLUOROSCOPY <1 HR PHYS/QHP: CPT

## 2020-09-21 PROCEDURE — 87040 BLOOD CULTURE FOR BACTERIA: CPT

## 2020-09-21 PROCEDURE — 87045 FECES CULTURE AEROBIC BACT: CPT

## 2020-09-21 PROCEDURE — C1722: CPT

## 2020-09-21 PROCEDURE — 84132 ASSAY OF SERUM POTASSIUM: CPT

## 2020-09-21 PROCEDURE — C9399: CPT

## 2020-09-21 PROCEDURE — 85014 HEMATOCRIT: CPT

## 2020-09-21 PROCEDURE — 87070 CULTURE OTHR SPECIMN AEROBIC: CPT

## 2020-09-21 PROCEDURE — 84295 ASSAY OF SERUM SODIUM: CPT

## 2020-09-21 PROCEDURE — 85730 THROMBOPLASTIN TIME PARTIAL: CPT

## 2020-09-21 PROCEDURE — C1773: CPT

## 2020-09-21 RX ORDER — INSULIN GLARGINE 100 [IU]/ML
68 INJECTION, SOLUTION SUBCUTANEOUS AT BEDTIME
Refills: 0 | Status: DISCONTINUED | OUTPATIENT
Start: 2020-09-21 | End: 2020-09-21

## 2020-09-21 RX ORDER — METOPROLOL TARTRATE 50 MG
1 TABLET ORAL
Qty: 60 | Refills: 0
Start: 2020-09-21 | End: 2020-10-20

## 2020-09-21 RX ORDER — METOPROLOL TARTRATE 50 MG
1 TABLET ORAL
Qty: 0 | Refills: 0 | DISCHARGE
Start: 2020-09-21 | End: 2020-10-20

## 2020-09-21 RX ORDER — INDAPAMIDE 1.25 MG
1 TABLET ORAL
Qty: 0 | Refills: 0 | DISCHARGE

## 2020-09-21 RX ORDER — ATORVASTATIN CALCIUM 80 MG/1
1 TABLET, FILM COATED ORAL
Qty: 30 | Refills: 0
Start: 2020-09-21 | End: 2020-10-20

## 2020-09-21 RX ORDER — INSULIN LISPRO 100/ML
50 VIAL (ML) SUBCUTANEOUS
Qty: 0 | Refills: 0 | DISCHARGE

## 2020-09-21 RX ORDER — ASPIRIN/CALCIUM CARB/MAGNESIUM 324 MG
1 TABLET ORAL
Qty: 30 | Refills: 2
Start: 2020-09-21 | End: 2020-12-19

## 2020-09-21 RX ORDER — METOPROLOL TARTRATE 50 MG
1 TABLET ORAL
Qty: 0 | Refills: 0 | DISCHARGE

## 2020-09-21 RX ORDER — CLOPIDOGREL BISULFATE 75 MG/1
1 TABLET, FILM COATED ORAL
Qty: 30 | Refills: 0
Start: 2020-09-21 | End: 2020-10-20

## 2020-09-21 RX ORDER — ROSUVASTATIN CALCIUM 5 MG/1
1 TABLET ORAL
Qty: 0 | Refills: 0 | DISCHARGE

## 2020-09-21 RX ORDER — INSULIN GLARGINE 100 [IU]/ML
74 INJECTION, SOLUTION SUBCUTANEOUS
Qty: 0 | Refills: 0 | DISCHARGE

## 2020-09-21 RX ADMIN — Medication 20 UNIT(S): at 07:50

## 2020-09-21 RX ADMIN — Medication 80 MILLIGRAM(S): at 05:23

## 2020-09-21 RX ADMIN — Medication 20 UNIT(S): at 13:02

## 2020-09-21 RX ADMIN — Medication 50 MILLIGRAM(S): at 00:16

## 2020-09-21 RX ADMIN — Medication 20 UNIT(S): at 17:26

## 2020-09-21 RX ADMIN — Medication 50 MILLIGRAM(S): at 13:14

## 2020-09-21 RX ADMIN — INFLUENZA VIRUS VACCINE 0.5 MILLILITER(S): 15; 15; 15; 15 SUSPENSION INTRAMUSCULAR at 18:51

## 2020-09-21 RX ADMIN — SACUBITRIL AND VALSARTAN 1 TABLET(S): 24; 26 TABLET, FILM COATED ORAL at 10:35

## 2020-09-21 RX ADMIN — CHLORHEXIDINE GLUCONATE 1 APPLICATION(S): 213 SOLUTION TOPICAL at 07:51

## 2020-09-21 RX ADMIN — Medication 4: at 07:50

## 2020-09-21 RX ADMIN — CEFTRIAXONE 100 MILLIGRAM(S): 500 INJECTION, POWDER, FOR SOLUTION INTRAMUSCULAR; INTRAVENOUS at 17:26

## 2020-09-21 RX ADMIN — CLOPIDOGREL BISULFATE 75 MILLIGRAM(S): 75 TABLET, FILM COATED ORAL at 13:03

## 2020-09-21 RX ADMIN — Medication 4: at 13:02

## 2020-09-21 RX ADMIN — Medication 81 MILLIGRAM(S): at 13:03

## 2020-09-21 RX ADMIN — PANTOPRAZOLE SODIUM 40 MILLIGRAM(S): 20 TABLET, DELAYED RELEASE ORAL at 05:24

## 2020-09-21 RX ADMIN — LORATADINE 10 MILLIGRAM(S): 10 TABLET ORAL at 13:14

## 2020-09-21 NOTE — PROGRESS NOTE ADULT - ASSESSMENT
Assessment: 59M PMH T2DM, HTN, CAD/MI s/p stents (most recent 2/2018), ICM w/ HFrEF (10-15%) s/p AICD, COPD (not on home O2), HTN, GERD, BPH, who is admitted for sepsis 2/2 gram positive bacteremia.   s/p MICU stay     #AICD Implantation s/p CCU stay   - had to be externally defibrillated as ICD initially not firing, wires adjusted and working,  -Patient doing well.   D/C planning today.     # Strep Gallolyticus bacteremia  -Continue with Ceftriaxone   -MRI Lumbar spine findings consistent with Acute Osteomyelitis.    -s/p Screening colonoscopy s/p polyps removed, follow up biopsy.     # Hypotension secondary to Septic Shock  - resolved, off Midodrine   - continue furosemide, Metoprolol    Diuresing as needed    # HFrEF s/p AICD  - most recent echo from 12/2019: EF 10-15% sev global LV systolic dysfunction. eccentric LVH  - cardiology following   - resumed b-blocker, continue with Lasix   - s/p AICD extraction in setting of Group D Strep Bacteremia       # CAD s/p stent  - c/w home DAPT (ASA off Plavix fro colonoscopy on Thursday.   -High dose statin.      GI/NUTRITION  - GI PCR negative.   - Continuing home pantoprazole po 40mg  - diabetic diet.  --Resume Plavix     - MANJIT improving -   - continuing home tamsulosin 0.4mg  -renal following       Hematologic  - Monitor CBC, currently stable    #DVT ppx  - SCDs for DVT ppx    # Hyperglycemia   - Pt w/ hx of IDDM on home Lantus 68 u qhs and Humalog 20units pre meal   - Will monitor FSS with moderate ISS    Ethics  - GOC discussed Patient wants full code.

## 2020-09-21 NOTE — PROGRESS NOTE ADULT - REASON FOR ADMISSION
fever, cough, emesis

## 2020-09-21 NOTE — PROGRESS NOTE ADULT - PROBLEM SELECTOR PROBLEM 3
Obesity (BMI 30.0-34.9)

## 2020-09-21 NOTE — PROGRESS NOTE ADULT - PROBLEM SELECTOR PLAN 1
Will continue Lantus 75 units at bed time.  Will increase Humalog to 30 units before each meal and continue Humalog correction scale coverage ac/hs. Will continue monitoring FS, log, and FU.  Patient counseled for compliance with consistent low carb diet and exercise as tolerated outpatient.
Patient with resistant DM, has large insulin requirement, blood sugars running high and not at target.  Suggest to increase Lantus to 60u at bedtime.  Suggest to increase Humalog to 16u before each meal and continue Humalog correction scale coverage ac/hs.  Will continue monitoring FS and FU.
Patient with resistant DM, has large insulin requirement, blood sugars running high and not at target.  Suggest to keep patient on IV insulin postoperatively.  Will continue monitoring FS and FU.
Suggest to continue holding standing-dose insulin while patient is NPO.  Will continue current insulin regimen for now. Will continue monitoring FS and FU.  Discussed plan with patient. Counseled for compliance with consistent low carb diet and exercise as tolerated outpatient.
Suggest to continue holding standing-dose insulin while patient is NPO.  Will decrease Lantus to 60u at bedtime.  Will decrease Humalog to 20u before each meal and continue Humalog correction scale coverage. Will continue monitoring FS and FU.  Discussed plan with patient. Counseled for compliance with consistent low carb diet and exercise as tolerated outpatient.
Will continue current insulin regimen for now. Will continue monitoring FS, log, and FU.  Discussed plan with patient. Counseled for compliance with consistent low carb diet and exercise as tolerated outpatient.
Will continue current insulin regimen for now. Will continue monitoring FS, log, will Follow up.  Patient counseled for compliance with consistent low carb diet.
Will continue current insulin regimen for now. Will continue monitoring FS, log, will Follow up.  Patient counseled for compliance with consistent low carb diet.
Will increase Lantus to 66 units at bed time.  Will increase  Humalog to 18 units before each meal in addition to Humalog correction scale coverage.  Patient counseled for compliance with consistent low carb diet.
Will increase Lantus to 68u at bedtime.  Will continue Humalog 20u before each meal as well as Humalog correction scale coverage. Will continue monitoring FS and FU.  Suggest DC home on current basal bolus insulin regimen with endo FU 2 weeks.  Discussed plan with patient. Counseled for compliance with consistent low carb diet and exercise as tolerated outpatient.
Will increase Lantus to 75 units at bed time.  Will increase  Humalog to 26 units before each meal and continue Humalog correction scale coverage ac/hs. Will continue monitoring FS, log, and FU.  Patient counseled for compliance with consistent low carb diet and exercise as tolerated outpatient.
Will increase Lantus to 80u at bedtime.  Will increase Humalog to 32u before each meal and continue Humalog correction scale coverage. Will continue monitoring FS and FU.  Discussed plan with patient. Counseled for compliance with consistent low carb diet and exercise as tolerated outpatient.

## 2020-09-21 NOTE — PROGRESS NOTE ADULT - ASSESSMENT
Assessment  DMT2:  59y Male with DM T2 with hyperglycemia, A1C 11.2%, on basal bolus insulin, blood sugars improving though still not at target, no hypoglycemic episodes. Patient is eating, alert and in good spirits, states he is going home today.  Septic Shock: Hydronephrosis, On IV ABx, stable, monitored.  Obesity: No strict exercise routines, not on any weight loss program, neither on low calorie diet.            Cortney Flanagan MD  Cell: [phone]  Office: [phone]               Assessment  DMT2:  59y Male with DM T2 with hyperglycemia, A1C 11.2%, on basal bolus insulin, blood sugars improving though still not at target, no hypoglycemic episodes. Patient is eating, alert and in good spirits,  states he is going home today.  Septic Shock: Hydronephrosis, On IV ABx, stable, monitored.  Obesity: No strict exercise routines, not on any weight loss program, neither on low calorie diet.            Cortney Flanagan MD  Cell: [phone]  Office: [phone]

## 2020-09-21 NOTE — PROGRESS NOTE ADULT - ASSESSMENT
ASSESSMENT/RECOMMENDATIONS:  59M PMH T2DM(a1c=11.2%), HTN, CAD/MI s/p stents (most recent 2/2018), HFrEF (10-15%), ICD, ischemic cardiomyopathy, COPD, HTN, GERD pw fever, n/v, back pain x 1 day. Pt states that he was in his baseline state of health when he began to experience back pain while grocery shopping yesterday. He describes sudden onset left lower back pain, palliated by sitting upright, of sharp quality, without radiation, of 7/10 severity, which completely resolved when presenting to ED.     VS: febrile 102.9, HR , BP 63/40 to 105/62, RR 17-25, SpO2%  on RA  Labs: significant for WBC 10.28, thrombocytopenia 146, elevated BUN/SCr 32/1.88, hyperglycemic 247, elevated proBNP 1158, lactate wnl  Micro: COVID-19 negative, RVP negative, UA 0 WBC and negative for bacteria. GI PCR negative. BCx: Strep by PCR.  CT: No pneumonia. Emphysema. No bowel or obstruction. Hepatomegaly and diffuse hepatic steatosis. Splenomegaly. Atrophic right kidney, with severe hydronephrosis. A 0.7 cm soft tissue density is noted in the interpolar region of the right kidney and is incompletely characterized. Ultrasound may be helpful for further evaluation.  Pt admitted to MICU for management of hypotension requiring pressor support. While in ICU experienced emesis and diarrhea (without melena/hematochezia) x 1 with SOB.     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    Overall bacteremia, strep infection.       Plan:   continue Rocephin at current dose.   s/p colonoscopy  MRI with possible early Osteomyelitis, no extension into epidural space, reviewed personally with neuroradiology, therefore will likely need a 6 week course of INTRAVENOUS  abs - day 12 of 42 of abx   follow up blood cultures so far NGTD  ICD lead cx NTD   Cr  remains elevated   follow ESR and CRP  s/p ICD  Ceftriaxone 2 gms IVSS daily   weekly cbc, cmp, ESR, CRP    follow up with ID, cardiology, EPS

## 2020-09-21 NOTE — PROGRESS NOTE ADULT - ASSESSMENT
Patient is a 59 year-old with known ischemic cardiomyopathy status post ICD ([employer], implanted in January 2012), presents with GI complaints, hypotension, found to have sepsis secondary to gram positive bacteremia of unclear source.  ICD explanted.  Now status post S-ICD implant.  Await pathology from colonoscopy.    ID consult for bacteremia. MRI for osteo of the spine - Continue antibiotics via PICC for total of 6 weeks.    Uptitrate beta-blocker and Entresto as BP permits.

## 2020-09-21 NOTE — PROGRESS NOTE ADULT - SUBJECTIVE AND OBJECTIVE BOX
INTERVAL HPI/OVERNIGHT EVENTS:     Patient ambulating well, s/p colonoscopy tolerated procedure.   No complaints                  T(C): 36.8 (09-21-20 @ 13:00), Max: 36.8 (09-21-20 @ 13:00)  HR: 84 (09-21-20 @ 13:00) (84 - 85)  BP: 115/74 (09-21-20 @ 13:00) (107/67 - 115/74)  RR: 18 (09-21-20 @ 13:00) (18 - 18)  SpO2: 98% (09-21-20 @ 13:00) (98% - 98%)    MEDICATIONS  (STANDING):  aspirin  chewable 81 milliGRAM(s) Oral daily  atorvastatin 80 milliGRAM(s) Oral at bedtime  cefTRIAXone   IVPB 2000 milliGRAM(s) IV Intermittent every 24 hours  chlorhexidine 4% Liquid 1 Application(s) Topical <User Schedule>  clopidogrel Tablet 75 milliGRAM(s) Oral daily  dextrose 5%. 1000 milliLiter(s) (50 mL/Hr) IV Continuous <Continuous>  dextrose 50% Injectable 12.5 Gram(s) IV Push once  dextrose 50% Injectable 25 Gram(s) IV Push once  dextrose 50% Injectable 25 Gram(s) IV Push once  furosemide    Tablet 80 milliGRAM(s) Oral daily  insulin glargine Injectable (LANTUS) 68 Unit(s) SubCutaneous at bedtime  insulin lispro (HumaLOG) corrective regimen sliding scale   SubCutaneous three times a day before meals  insulin lispro (HumaLOG) corrective regimen sliding scale   SubCutaneous at bedtime  insulin lispro Injectable (HumaLOG) 20 Unit(s) SubCutaneous three times a day with meals  loratadine 10 milliGRAM(s) Oral daily  metoprolol succinate ER 50 milliGRAM(s) Oral every 12 hours  pantoprazole    Tablet 40 milliGRAM(s) Oral before breakfast  sacubitril 49 mG/valsartan 51 mG 1 Tablet(s) Oral two times a day  tamsulosin 0.4 milliGRAM(s) Oral at bedtime    MEDICATIONS  (PRN):  acetaminophen   Tablet .. 650 milliGRAM(s) Oral every 6 hours PRN Temp greater or equal to 38C (100.4F), Mild Pain (1 - 3)  ALBUTerol    90 MICROgram(s) HFA Inhaler 2 Puff(s) Inhalation every 12 hours PRN Shortness of Breath and/or Wheezing  dextrose 40% Gel 15 Gram(s) Oral once PRN Blood Glucose LESS THAN 70 milliGRAM(s)/deciliter  glucagon  Injectable 1 milliGRAM(s) IntraMuscular once PRN Glucose LESS THAN 70 milligrams/deciliter  sodium chloride 0.9% lock flush 10 milliLiter(s) IV Push every 1 hour PRN Pre/post blood products, medications, blood draw, and to maintain line patency      Constitutional: NAD. well-developed; well-groomed; well-nourished;  HEENT: AT/NC, PERRLA; EOMI, MMM, no oropharyngeal lesions, no erythema, no exudates,   Respiratory: CTAB. equal aeration bilaterally. no wheezing, no crackes, no rhonchi.   Cardiovascular: RRR, no M/R/G. no JVD  Gastrointestinal: soft; NT/ND, obese, +BS, no rebounding tenderness / guarding / HSM / mass / ascites.  Extremities: no clubbing; no cyanosis; 1+ LE edema to shins, non-tender to palpation, DP and Radial pulses intact.  Skin: warm and dry; color normal: no rash: no ulcers  Neurological: A&Ox 3; responds to pain; responds to verbal commands; CN nerves grossly intact.                                11.0   8.35  )-----------( 217      ( 21 Sep 2020 05:18 )             35.1               140|104|30<230  3.9|24|1.49  9.6,2.0,--  09-21 @ 05:17  GI PCR Panel, Stool (collected 09 Sep 2020 10:21)  Source: .Stool Feces sarina garcia  Final Report (09 Sep 2020 12:58):    GI PCR Results: NOT detected    *******Please Note:*******    GI panel PCR evaluates for:    Campylobacter, Plesiomonas shigelloides, Salmonella,    Vibrio, Yersinia enterocolitica, Enteroaggregative    Escherichia coli (EAEC), Enteropathogenic E.coli (EPEC),    Enterotoxigenic E. coli (ETEC) lt/st, Shiga-like    toxin-producing E. coli (STEC) stx1/stx2,    Shigella/ Enteroinvasive E. coli (EIEC), Cryptosporidium,    Cyclospora cayetanensis, Entamoeba histolytica,    Giardia lamblia, Adenovirus F 40/41, Astrovirus,    Norovirus GI/GII, Rotavirus A, Sapovirus    Culture - Stool (09.09.20 @ 10:21)    Specimen Source: .Stool Feces  sarina jose    Culture Results:   No enteric pathogens to date: Final culture pending    Culture - Blood (09.09.20 @ 00:57)    Gram Stain:   Growth in aerobic and anaerobic bottles: Gram Positive Cocci in Pairs and  Chains    Specimen Source: .Blood Blood-Peripheral    Culture Results:   Growth in aerobic and anaerobic bottles: Streptococcus gallolyticus  See previous culture [id number]        IMAGING:      < from: CT Chest No Cont (09.09.20 @ 01:27) >  IMPRESSION:  No pneumonia.    Emphysema.    No bowel or obstruction.    Hepatomegaly and diffuse hepatic steatosis.    Splenomegaly.    Atrophic right kidney, with severe hydronephrosis. A 0.7 cm soft tissue density is noted in the interpolar region of the right kidney and is incompletely characterized. Ultrasound may be helpful for further evaluation.    < end of copied text >

## 2020-09-21 NOTE — PROGRESS NOTE ADULT - SUBJECTIVE AND OBJECTIVE BOX
Patient is a 59y old  Male who presents with a chief complaint of fever, cough, emesis (20 Sep 2020 21:52)    Being followed by ID for        Interval history:  pt for discharge today  s/p PICC  defibrillator in place  No other acute events    PAST MEDICAL & SURGICAL HISTORY:  H/O gastroesophageal reflux (GERD)    History of COPD    History of ischemic cardiomyopathy    Heart failure with reduced ejection fraction    Hypertension    AICD (automatic cardioverter/defibrillator) present    Diabetes    Stented coronary artery    H/O vasectomy  20 yrs ago (2000)      Allergies    No Known Allergies    Intolerances      Antimicrobials:    cefTRIAXone   IVPB 2000 milliGRAM(s) IV Intermittent every 24 hours    MEDICATIONS  (STANDING):  aspirin  chewable 81 milliGRAM(s) Oral daily  atorvastatin 80 milliGRAM(s) Oral at bedtime  cefTRIAXone   IVPB 2000 milliGRAM(s) IV Intermittent every 24 hours  chlorhexidine 4% Liquid 1 Application(s) Topical <User Schedule>  clopidogrel Tablet 75 milliGRAM(s) Oral daily  dextrose 5%. 1000 milliLiter(s) (50 mL/Hr) IV Continuous <Continuous>  dextrose 50% Injectable 12.5 Gram(s) IV Push once  dextrose 50% Injectable 25 Gram(s) IV Push once  dextrose 50% Injectable 25 Gram(s) IV Push once  furosemide    Tablet 80 milliGRAM(s) Oral daily  influenza   Vaccine 0.5 milliLiter(s) IntraMuscular once  insulin glargine Injectable (LANTUS) 60 Unit(s) SubCutaneous at bedtime  insulin lispro (HumaLOG) corrective regimen sliding scale   SubCutaneous three times a day before meals  insulin lispro (HumaLOG) corrective regimen sliding scale   SubCutaneous at bedtime  insulin lispro Injectable (HumaLOG) 20 Unit(s) SubCutaneous three times a day with meals  loratadine 10 milliGRAM(s) Oral daily  metoprolol succinate ER 50 milliGRAM(s) Oral every 12 hours  pantoprazole    Tablet 40 milliGRAM(s) Oral before breakfast  sacubitril 49 mG/valsartan 51 mG 1 Tablet(s) Oral two times a day  tamsulosin 0.4 milliGRAM(s) Oral at bedtime      Vital Signs Last 24 Hrs  T(C): 37.2 (09-21-20 @ 04:32), Max: 37.2 (09-21-20 @ 04:32)  T(F): 98.9 (09-21-20 @ 04:32), Max: 98.9 (09-21-20 @ 04:32)  HR: 88 (09-21-20 @ 04:32) (85 - 99)  BP: 101/69 (09-21-20 @ 04:32) (101/69 - 164/85)  BP(mean): --  RR: 18 (09-21-20 @ 07:14) (17 - 18)  SpO2: 98% (09-21-20 @ 07:14) (96% - 99%)    Physical Exam:    Constitutional well preserved,comfortable,pleasant    HEENT PERRLA EOMI,No pallor or icterus    No oral exudate or erythema    Neck supple no JVD or LN    Chest Good AE,CTA    CVS  S1 S2     incision with staples left chest    Abd soft BS normal No tenderness     Ext No cyanosis clubbing or edema    PICC site no erythema tenderness or discharge    Joints no swelling or LOM    CNS AAO X 3 no focal    Lab Data:                          11.0   8.35  )-----------( 217      ( 21 Sep 2020 05:18 )             35.1       09-21    140  |  104  |  30<H>  ----------------------------<  230<H>  3.9   |  24  |  1.49<H>    Ca    9.6      21 Sep 2020 05:17  Phos  2.8     09-20  Mg     2.0     09-21        Culture - Blood (09.14.20 @ 10:06)    Specimen Source: .Blood Blood-Peripheral    Culture Results:   No Growth Final    Culture - Blood (09.09.20 @ 00:57)    -  Ceftriaxone: S 0.125    -  Clindamycin: S    -  Erythromycin: S    -  Levofloxacin: S    -  Penicillin: S 0.094    -  Vancomycin: S    Gram Stain:   Growth in anaerobic bottle: Gram Positive Cocci in Pairs and Chains  Growth in aerobic bottle: Gram Positive Cocci in Pairs and Chains    -  Streptococcus sp. (Not Grp A, B or S pneumoniae): Detec    Specimen Source: .Blood Blood-Peripheral    Organism: Blood Culture PCR    Organism: Streptococcus gallolyticus    Organism: Streptococcus gallolyticus    Culture Results:   Growth in aerobic and anaerobic bottles: Streptococcus gallolyticus  "Due to technical problems, Proteus sp. will Not be reported as part of  the BCID panel until further notice"  ***Blood Panel PCR results on this specimen are available  approximately 3 hours after the Gram stain result.***  Gram stain, PCR, and/or culture results may not always  correspond due to difference in methodologies.  ************************************************************  This PCR assay was performed using [employer].  The following targets are tested for: Enterococcus,  vancomycin resistant enterococci, Listeria monocytogenes,  coagulase negative staphylococci, S. aureus,  methicillin resistant S. aureus, Streptococcus agalactiae  (Group B), S. pneumoniae, S. pyogenes (Group A),  Acinetobacter baumannii, Enterobacter cloacae, E. coli,  Klebsiella oxytoca, K. pneumoniae, Proteus sp.,  Serratia marcescens, Haemophilus influenzae,  Neisseria meningitidis, Pseudomonas aeruginosa, Candida  albicans, C. glabrata, C krusei, C parapsilosis,  C. tropicalis and the KPC resistance gene.    Organism Identification: Blood Culture PCR  Streptococcus gallolyticus    Method Type: ETEST    Method Type: PCR    Method Type: KB                      WBC Count: 8.35 (09-21-20 @ 05:18)  WBC Count: 9.24 (09-20-20 @ 05:19)  WBC Count: 10.91 (09-19-20 @ 05:34)  WBC Count: 11.84 (09-18-20 @ 14:05)  WBC Count: 8.54 (09-18-20 @ 07:26)  WBC Count: 8.62 (09-17-20 @ 06:24)  WBC Count: 9.24 (09-15-20 @ 09:12)

## 2020-09-21 NOTE — PROGRESS NOTE ADULT - PROBLEM SELECTOR PLAN 3
Overweight/Obesity: Patient counseled for weight loss, exercise, low calorie diet.

## 2020-09-21 NOTE — PROGRESS NOTE ADULT - SUBJECTIVE AND OBJECTIVE BOX
Chief complaint  Patient is a 59y old  Male who presents with a chief complaint of fever, cough, emesis (21 Sep 2020 10:14)   Review of systems  Patient awake in chair, looks comfortable, no hypoglycemic episodes.  Planning DC home today.    Labs and Fingersticks  CAPILLARY BLOOD GLUCOSE      POCT Blood Glucose.: 220 mg/dL (21 Sep 2020 13:01)  POCT Blood Glucose.: 247 mg/dL (21 Sep 2020 07:24)  POCT Blood Glucose.: 154 mg/dL (20 Sep 2020 21:39)  POCT Blood Glucose.: 177 mg/dL (20 Sep 2020 17:03)    Medications  MEDICATIONS  (STANDING):  aspirin  chewable 81 milliGRAM(s) Oral daily  atorvastatin 80 milliGRAM(s) Oral at bedtime  cefTRIAXone   IVPB 2000 milliGRAM(s) IV Intermittent every 24 hours  chlorhexidine 4% Liquid 1 Application(s) Topical <User Schedule>  clopidogrel Tablet 75 milliGRAM(s) Oral daily  dextrose 5%. 1000 milliLiter(s) (50 mL/Hr) IV Continuous <Continuous>  dextrose 50% Injectable 12.5 Gram(s) IV Push once  dextrose 50% Injectable 25 Gram(s) IV Push once  dextrose 50% Injectable 25 Gram(s) IV Push once  furosemide    Tablet 80 milliGRAM(s) Oral daily  influenza   Vaccine 0.5 milliLiter(s) IntraMuscular once  insulin glargine Injectable (LANTUS) 68 Unit(s) SubCutaneous at bedtime  insulin lispro (HumaLOG) corrective regimen sliding scale   SubCutaneous three times a day before meals  insulin lispro (HumaLOG) corrective regimen sliding scale   SubCutaneous at bedtime  insulin lispro Injectable (HumaLOG) 20 Unit(s) SubCutaneous three times a day with meals  loratadine 10 milliGRAM(s) Oral daily  metoprolol succinate ER 50 milliGRAM(s) Oral every 12 hours  pantoprazole    Tablet 40 milliGRAM(s) Oral before breakfast  sacubitril 49 mG/valsartan 51 mG 1 Tablet(s) Oral two times a day  tamsulosin 0.4 milliGRAM(s) Oral at bedtime      Physical Exam  General: Patient comfortable in bed  Vital Signs Last 12 Hrs  T(F): 98.9 (09-21-20 @ 04:32), Max: 98.9 (09-21-20 @ 04:32)  HR: 84 (09-21-20 @ 13:00) (84 - 88)  BP: 115/74 (09-21-20 @ 13:00) (101/69 - 115/74)  BP(mean): --  RR: 18 (09-21-20 @ 13:00) (18 - 18)  SpO2: 98% (09-21-20 @ 13:00) (96% - 98%)  Neck: No palpable thyroid nodules.  CVS: S1S2, No murmurs  Respiratory: No wheezing, no crepitations  GI: Abdomen soft, bowel sounds positive  Musculoskeletal:  edema lower extremities.   Skin: No skin rashes, no ecchymosis    Diagnostics    A1C with Estimated Average Glucose: Routine (09-09 @ 13:22)           Chief complaint  Patient is a 59y old  Male who presents with a chief complaint of fever, cough, emesis (21 Sep 2020 10:14)   Review of systems  Patient awake in chair, looks comfortable, no hypoglycemic episodes.  Planning DC home today.    Labs and Fingersticks  CAPILLARY BLOOD GLUCOSE      POCT Blood Glucose.: 220 mg/dL (21 Sep 2020 13:01)  POCT Blood Glucose.: 247 mg/dL (21 Sep 2020 07:24)  POCT Blood Glucose.: 154 mg/dL (20 Sep 2020 21:39)  POCT Blood Glucose.: 177 mg/dL (20 Sep 2020 17:03)    Medications  MEDICATIONS  (STANDING):  aspirin  chewable 81 milliGRAM(s) Oral daily  atorvastatin 80 milliGRAM(s) Oral at bedtime  cefTRIAXone   IVPB 2000 milliGRAM(s) IV Intermittent every 24 hours  chlorhexidine 4% Liquid 1 Application(s) Topical <User Schedule>  clopidogrel Tablet 75 milliGRAM(s) Oral daily  dextrose 5%. 1000 milliLiter(s) (50 mL/Hr) IV Continuous <Continuous>  dextrose 50% Injectable 12.5 Gram(s) IV Push once  dextrose 50% Injectable 25 Gram(s) IV Push once  dextrose 50% Injectable 25 Gram(s) IV Push once  furosemide    Tablet 80 milliGRAM(s) Oral daily  influenza   Vaccine 0.5 milliLiter(s) IntraMuscular once  insulin glargine Injectable (LANTUS) 68 Unit(s) SubCutaneous at bedtime  insulin lispro (HumaLOG) corrective regimen sliding scale   SubCutaneous three times a day before meals  insulin lispro (HumaLOG) corrective regimen sliding scale   SubCutaneous at bedtime  insulin lispro Injectable (HumaLOG) 20 Unit(s) SubCutaneous three times a day with meals  loratadine 10 milliGRAM(s) Oral daily  metoprolol succinate ER 50 milliGRAM(s) Oral every 12 hours  pantoprazole    Tablet 40 milliGRAM(s) Oral before breakfast  sacubitril 49 mG/valsartan 51 mG 1 Tablet(s) Oral two times a day  tamsulosin 0.4 milliGRAM(s) Oral at bedtime      Physical Exam  General: Patient comfortable in bed  Vital Signs Last 12 Hrs  T(F): 98.9 (09-21-20 @ 04:32), Max: 98.9 (09-21-20 @ 04:32)  HR: 84 (09-21-20 @ 13:00) (84 - 88)  BP: 115/74 (09-21-20 @ 13:00) (101/69 - 115/74)  BP(mean): --  RR: 18 (09-21-20 @ 13:00) (18 - 18)  SpO2: 98% (09-21-20 @ 13:00) (96% - 98%)  Neck: No palpable thyroid nodules.  CVS: S1S2, No murmurs  Respiratory: No wheezing, no crepitations  GI: Abdomen soft, bowel sounds positive  Musculoskeletal:  edema lower extremities.   Skin: No skin rashes, no ecchymosis    Diagnostics    A1C with Estimated Average Glucose: Routine (09-09 @ 13:22)

## 2020-09-21 NOTE — DISCHARGE NOTE NURSING/CASE MANAGEMENT/SOCIAL WORK - NSDCFUADDAPPT_GEN_ALL_CORE_FT
Please follow up with your PCP in a week for further management  Please follow up with Renal, Dr. Cain for further management for your kidney mass and Right side of hydronephrosis   Please follow up with EP, on 9/28/20 13:40 for wound check for ICD  Please follow up with Endo in 2 weeks for your diabetes management

## 2020-09-21 NOTE — DISCHARGE NOTE NURSING/CASE MANAGEMENT/SOCIAL WORK - PATIENT PORTAL LINK FT
You can access the FollowMyHealth Patient Portal offered by [facility] by registering at the following website: [email or web address]. By joining [facility]’s FollowMyHealth portal, you will also be able to view your health information using other applications (apps) compatible with our system.

## 2020-09-21 NOTE — PROGRESS NOTE ADULT - PROBLEM SELECTOR PLAN 2
Suggest to continue medications, monitoring, FU primary team recommendations.

## 2020-09-21 NOTE — PROGRESS NOTE ADULT - ATTENDING COMMENTS
Shivani Rodríguez M.D. ,   Pager [phone]     after 5PM/ weekends [phone]
Joce Faulkner  Pager: [phone]. If no response or past 5 pm call [phone].
No fevers, cardiovascular status stable. Await colonoscopy. Have discussed with Dr. Shivani Rodríguez of ID. Likely implant tomorrow
Patient with known ICM with CHF, AICD< COPD here with septic shock due to GPC bacteremia, unclear source.  Was on pressors very briefly, improved.  Given strep bacteremia, EP decided to have PPM device extracted.  Done this AM.  Will monitor post-op for hypoxia or hypotension.  c/w abx as per ID.  MANJIT improving.
Shivani Rodríguez M.D. ,   Pager [phone]     after 5PM/ weekends [phone]
Shivani Rodríguez M.D. ,   Pager [phone]     after 5PM/ weekends [phone]    ID service will be covering over the weekend. Please call for acute issues or questions. [phone]
Shivani Rodríguez M.D. ,   Pager [phone]     after 5PM/ weekends [phone]      I will be away as of tomorrow. My associates will be covering. Please call [phone] with acute issues, questions.
Shivani Rodríguez M.D. ,   Pager [phone]     after 5PM/ weekends [phone]
60y/o male h/o morbidly obesity h/o COPD (not on home O2), DM, HTN, MI, CAD s/p PCI (2/2018), ICM w/ HFrEF w/ 10-15% s/p primary prevention [employer] single chamber ICD, HTN, GERD, BPH, p/w sepsis secondary to gram positive bacteremia s/p ICD extraction 9/10. Now s/p subQ ICD- doing well. DC plan.
He does not require chronic pacing so Sub Q ICD should be fine. Await final ID clearance after evaluation for possible spinal abscess
Due to strept organism associated with possible GI malignancy, he will undergo colonoscopy prior to SQ ICD
NO changes to above assessment and plan
Patient is seen and examined with fellow, NP and the CCU house-staff. I agree with the history, physical and the assessment and plan.  s/p SQ ICD - with temporary pressor/inotropic support post the DFTs  now off of inotropic support  c/w abx - plan for PICC for long term ABX - f/u with ID
Patient with known ICM with CHF, AICD< COPD here with septic shock due to GPC bacteremia, unclear source.  Was on pressors very briefly, improved.  Given strep bacteremia, EP decided to have PPM device extracted.  Done this AM.  Will monitor post-op for hypoxia or hypotension.  c/w abx as per ID.  MANJIT improving.
Patient with known ICM with CHF, AICD< COPD here with septic shock due to GPC bacteremia, unclear source.  Was on pressors very briefly, improved.  Given strep bacteremia, EP decided to have PPM device extracted.  Done yesterday, no acute complications.  Feeling well this AM, MANJIT slowly resolving, resume home diuretics.  c/w abx as per ID.  Adjust insulin as needed for DM2. F/u GI reccs as possible source of bacteremia.
Shivani Rodríguez M.D. ,   Pager [phone]     after 5PM/ weekends [phone]
Shivani Rodríguez M.D. ,   Pager [phone]     after 5PM/ weekends [phone]
Patient with known ICM with CHF, AICD< COPD here with septic shock due to GPC bacteremia, unclear source.  Was on pressors very briefly, improved this AM and now off.  No obvious MR on POCUS, will get formal TTE.  C/w vancomycin for now, f/u ID reccs.  Do not suspect  source as hydro is chronic. Has MANJIT, given diuretics overnight, monitor off diuretics for now.  Check AM BNP.  Hold entresto for heart failure given MANJIT.
Patient seen and exam today at bedside. Chart, labs, vitals, radiology reviewed. Above H&P reviewed and edited where appropriate.  Agree with history and physical exam. Agree with assessment and plan.
Sam Orozco  Attending Physician   Division of Infectious Disease  Pager #[phone]  After 5pm/weekend or no response, call #[phone]    Please call the ID service [phone] with questions or concerns over the weekend.
Sam Orozco  Attending Physician   Division of Infectious Disease  Pager #[phone]  After 5pm/weekend or no response, call #[phone]    Please call the ID service [phone] with questions or concerns over the weekend.

## 2020-09-21 NOTE — PROGRESS NOTE ADULT - SUBJECTIVE AND OBJECTIVE BOX
HPI:  Patient seen and examined at bedside on 2 DSU.  S-ICD in place with staples in axilla and subcostal.    Review Of Systems:           Respiratory: No shortness of breath, cough, or wheezing  Cardiovascular: No chest pain or palpitations  10 point review of systems is otherwise negative except as mentioned above        Medications:  acetaminophen   Tablet .. 650 milliGRAM(s) Oral every 6 hours PRN  ALBUTerol    90 MICROgram(s) HFA Inhaler 2 Puff(s) Inhalation every 12 hours PRN  aspirin  chewable 81 milliGRAM(s) Oral daily  atorvastatin 80 milliGRAM(s) Oral at bedtime  cefTRIAXone   IVPB 2000 milliGRAM(s) IV Intermittent every 24 hours  chlorhexidine 4% Liquid 1 Application(s) Topical <User Schedule>  clopidogrel Tablet 75 milliGRAM(s) Oral daily  dextrose 40% Gel 15 Gram(s) Oral once PRN  dextrose 5%. 1000 milliLiter(s) IV Continuous <Continuous>  dextrose 50% Injectable 12.5 Gram(s) IV Push once  dextrose 50% Injectable 25 Gram(s) IV Push once  dextrose 50% Injectable 25 Gram(s) IV Push once  furosemide    Tablet 80 milliGRAM(s) Oral daily  glucagon  Injectable 1 milliGRAM(s) IntraMuscular once PRN  insulin glargine Injectable (LANTUS) 68 Unit(s) SubCutaneous at bedtime  insulin lispro (HumaLOG) corrective regimen sliding scale   SubCutaneous three times a day before meals  insulin lispro (HumaLOG) corrective regimen sliding scale   SubCutaneous at bedtime  insulin lispro Injectable (HumaLOG) 20 Unit(s) SubCutaneous three times a day with meals  loratadine 10 milliGRAM(s) Oral daily  metoprolol succinate ER 50 milliGRAM(s) Oral every 12 hours  pantoprazole    Tablet 40 milliGRAM(s) Oral before breakfast  sacubitril 49 mG/valsartan 51 mG 1 Tablet(s) Oral two times a day  sodium chloride 0.9% lock flush 10 milliLiter(s) IV Push every 1 hour PRN  tamsulosin 0.4 milliGRAM(s) Oral at bedtime    PAST MEDICAL & SURGICAL HISTORY:  H/O gastroesophageal reflux (GERD)    History of COPD    History of ischemic cardiomyopathy    Heart failure with reduced ejection fraction    Hypertension    AICD (automatic cardioverter/defibrillator) present    Diabetes    Stented coronary artery    H/O vasectomy  20 yrs ago ()      Vitals:  T(C): 36.8 (20 @ 13:00), Max: 37.2 (20 @ 04:32)  HR: 84 (20 @ 13:00) (84 - 88)  BP: 115/74 (20 @ 13:00) (101/69 - 164/85)  BP(mean): --  RR: 18 (20 @ 13:00) (18 - 18)  SpO2: 98% (20 @ 13:00) (96% - 99%)  Wt(kg): --  Daily     Daily Weight in k.5 (21 Sep 2020 08:15)  I&O's Summary    20 Sep 2020 07:  -  21 Sep 2020 07:00  --------------------------------------------------------  IN: 1340 mL / OUT: 2850 mL / NET: -1510 mL    21 Sep 2020 07:  -  21 Sep 2020 20:42  --------------------------------------------------------  IN: 900 mL / OUT: 1500 mL / NET: -600 mL        Physical Exam:  Appearance: Normal, well groomed, NAD  Eyes: PERRLA, EOMI, pink conjunctiva, no scleral icterus   HENT: Normal oral mucosa  Cardiovascular: RRR, S1, S2, no murmur, rub, or gallop; +edema; +JVD  Procedural access site: clean, dry, intact without hematoma  Respiratory: diminished breath sounds throughout  Gastrointestinal: Soft, non-tender, non-distended, BS+  Musculoskeletal: No clubbing or joint deformity   Neurologic: No focal weakness  Lymphatic: No lymphadenopathy  Psychiatry: AAOx3 with appropriate mood and affect  Skin: No rashes, ecchymoses, or cyanosis; +tattoos; +staples for S-CIICD                          11.0   8.35  )-----------( 217      ( 21 Sep 2020 05:18 )             35.1     09-21    140  |  104  |  30<H>  ----------------------------<  230<H>  3.9   |  24  |  1.49<H>    Ca    9.6      21 Sep 2020 05:17  Phos  2.8     09-20  Mg     2.0

## 2020-09-28 ENCOUNTER — APPOINTMENT (OUTPATIENT)
Dept: ELECTROPHYSIOLOGY | Facility: CLINIC | Age: 60
End: 2020-09-28
Payer: MEDICARE

## 2020-09-28 ENCOUNTER — NON-APPOINTMENT (OUTPATIENT)
Age: 60
End: 2020-09-28

## 2020-09-28 VITALS
OXYGEN SATURATION: 97 % | BODY MASS INDEX: 32.47 KG/M2 | SYSTOLIC BLOOD PRESSURE: 105 MMHG | DIASTOLIC BLOOD PRESSURE: 65 MMHG | WEIGHT: 275 LBS | HEIGHT: 77 IN | HEART RATE: 90 BPM

## 2020-09-28 PROCEDURE — 93261 INTERROGATE SUBQ DEFIB: CPT

## 2020-09-28 PROCEDURE — 99024 POSTOP FOLLOW-UP VISIT: CPT

## 2020-10-02 ENCOUNTER — APPOINTMENT (OUTPATIENT)
Dept: INFECTIOUS DISEASE | Facility: CLINIC | Age: 60
End: 2020-10-02
Payer: MEDICARE

## 2020-10-02 ENCOUNTER — APPOINTMENT (OUTPATIENT)
Dept: ELECTROPHYSIOLOGY | Facility: CLINIC | Age: 60
End: 2020-10-02
Payer: MEDICARE

## 2020-10-02 VITALS
BODY MASS INDEX: 32.23 KG/M2 | TEMPERATURE: 98.3 F | DIASTOLIC BLOOD PRESSURE: 71 MMHG | OXYGEN SATURATION: 97 % | HEART RATE: 92 BPM | WEIGHT: 273 LBS | SYSTOLIC BLOOD PRESSURE: 112 MMHG | HEIGHT: 77 IN

## 2020-10-02 LAB
ALBUMIN SERPL ELPH-MCNC: 4.4 G/DL
ALP BLD-CCNC: 93 U/L
ALT SERPL-CCNC: 34 U/L
ANION GAP SERPL CALC-SCNC: 14 MMOL/L
AST SERPL-CCNC: 23 U/L
BASOPHILS # BLD AUTO: 0.05 K/UL
BASOPHILS NFR BLD AUTO: 0.8 %
BILIRUB SERPL-MCNC: 0.3 MG/DL
BUN SERPL-MCNC: 31 MG/DL
CALCIUM SERPL-MCNC: 9.6 MG/DL
CHLORIDE SERPL-SCNC: 102 MMOL/L
CO2 SERPL-SCNC: 23 MMOL/L
CREAT SERPL-MCNC: 1.64 MG/DL
CRP SERPL-MCNC: 0.84 MG/DL
EOSINOPHIL # BLD AUTO: 0.45 K/UL
EOSINOPHIL NFR BLD AUTO: 6.8 %
GLUCOSE SERPL-MCNC: 259 MG/DL
HCT VFR BLD CALC: 37.8 %
HGB BLD-MCNC: 11.8 G/DL
IMM GRANULOCYTES NFR BLD AUTO: 0.5 %
LYMPHOCYTES # BLD AUTO: 1.21 K/UL
LYMPHOCYTES NFR BLD AUTO: 18.3 %
MAN DIFF?: NORMAL
MCHC RBC-ENTMCNC: 25.7 PG
MCHC RBC-ENTMCNC: 31.2 GM/DL
MCV RBC AUTO: 82.4 FL
MONOCYTES # BLD AUTO: 0.58 K/UL
MONOCYTES NFR BLD AUTO: 8.8 %
NEUTROPHILS # BLD AUTO: 4.3 K/UL
NEUTROPHILS NFR BLD AUTO: 64.8 %
PLATELET # BLD AUTO: 165 K/UL
POTASSIUM SERPL-SCNC: 3.9 MMOL/L
PROT SERPL-MCNC: 7.1 G/DL
RBC # BLD: 4.59 M/UL
RBC # FLD: 15.8 %
SODIUM SERPL-SCNC: 140 MMOL/L
WBC # FLD AUTO: 6.62 K/UL

## 2020-10-02 PROCEDURE — 99024 POSTOP FOLLOW-UP VISIT: CPT

## 2020-10-02 PROCEDURE — 99215 OFFICE O/P EST HI 40 MIN: CPT

## 2020-10-03 LAB — ERYTHROCYTE [SEDIMENTATION RATE] IN BLOOD BY WESTERGREN METHOD: 45 MM/HR

## 2020-10-03 RX ORDER — METFORMIN HYDROCHLORIDE 1000 MG/1
1000 TABLET, COATED ORAL
Qty: 180 | Refills: 0 | Status: DISCONTINUED | COMMUNITY
Start: 2017-08-02 | End: 2020-10-03

## 2020-10-03 RX ORDER — NICOTINE 21 MG/24HR
21 PATCH, TRANSDERMAL 24 HOURS TRANSDERMAL DAILY
Qty: 1 | Refills: 1 | Status: DISCONTINUED | COMMUNITY
Start: 2017-08-24 | End: 2020-10-03

## 2020-10-03 RX ORDER — METOPROLOL SUCCINATE 100 MG/1
100 TABLET, EXTENDED RELEASE ORAL DAILY
Qty: 90 | Refills: 3 | Status: DISCONTINUED | COMMUNITY
Start: 2018-02-08 | End: 2020-10-03

## 2020-10-03 RX ORDER — NICOTINE TRANSDERMAL SYSTEM 14 MG/24H
14 PATCH, EXTENDED RELEASE TRANSDERMAL
Qty: 14 | Refills: 0 | Status: DISCONTINUED | COMMUNITY
Start: 2018-02-08 | End: 2020-10-03

## 2020-10-03 RX ORDER — SACUBITRIL AND VALSARTAN 24; 26 MG/1; MG/1
24-26 TABLET, FILM COATED ORAL TWICE DAILY
Qty: 60 | Refills: 3 | Status: DISCONTINUED | COMMUNITY
Start: 2019-12-26 | End: 2020-10-03

## 2020-10-03 RX ORDER — FUROSEMIDE 20 MG/1
20 TABLET ORAL
Qty: 45 | Refills: 3 | Status: COMPLETED | COMMUNITY
Start: 2019-12-26 | End: 2020-10-03

## 2020-10-03 RX ORDER — SPIRONOLACTONE 25 MG/1
25 TABLET ORAL
Qty: 30 | Refills: 0 | Status: DISCONTINUED | COMMUNITY
Start: 2018-02-08 | End: 2020-10-03

## 2020-10-12 ENCOUNTER — APPOINTMENT (OUTPATIENT)
Dept: CARDIOLOGY | Facility: CLINIC | Age: 60
End: 2020-10-12

## 2020-10-27 ENCOUNTER — APPOINTMENT (OUTPATIENT)
Dept: INFECTIOUS DISEASE | Facility: CLINIC | Age: 60
End: 2020-10-27
Payer: MEDICARE

## 2020-10-27 VITALS
TEMPERATURE: 98.5 F | SYSTOLIC BLOOD PRESSURE: 109 MMHG | OXYGEN SATURATION: 98 % | HEART RATE: 97 BPM | BODY MASS INDEX: 32.59 KG/M2 | DIASTOLIC BLOOD PRESSURE: 68 MMHG | HEIGHT: 77 IN | WEIGHT: 276 LBS

## 2020-10-27 DIAGNOSIS — I33.0 ACUTE AND SUBACUTE INFECTIVE ENDOCARDITIS: ICD-10-CM

## 2020-10-27 DIAGNOSIS — K63.5 POLYP OF COLON: ICD-10-CM

## 2020-10-27 DIAGNOSIS — J81.1 CHRONIC PULMONARY EDEMA: ICD-10-CM

## 2020-10-27 DIAGNOSIS — M46.20 OSTEOMYELITIS OF VERTEBRA, SITE UNSPECIFIED: ICD-10-CM

## 2020-10-27 PROCEDURE — 99214 OFFICE O/P EST MOD 30 MIN: CPT

## 2020-10-28 LAB
CRP SERPL-MCNC: 1.3 MG/DL
ERYTHROCYTE [SEDIMENTATION RATE] IN BLOOD BY WESTERGREN METHOD: 32 MM/HR

## 2020-10-31 PROBLEM — J81.1 PULMONARY EDEMA: Status: ACTIVE | Noted: 2020-10-31

## 2020-10-31 PROBLEM — I33.0 BACTERIAL ENDOCARDITIS: Status: ACTIVE | Noted: 2020-10-02

## 2020-10-31 PROBLEM — K63.5 POLYP, COLONIC: Status: ACTIVE | Noted: 2020-10-31

## 2020-10-31 PROBLEM — M46.20 OSTEOMYELITIS OF SPINE: Status: ACTIVE | Noted: 2020-10-03

## 2020-10-31 NOTE — REVIEW OF SYSTEMS
[Negative] : Heme/Lymph [FreeTextEntry6] : had SOB - found fluid in lungs and then felt better with diuresis  gayla Adan- primary care [FreeTextEntry9] : restless leg pills

## 2020-10-31 NOTE — ASSESSMENT
[FreeTextEntry1] : 61 yo M PMH T2DM, HTN, CAD/MI s/p stents (most recent 2/2018), HFrEF (10-15%), ICD,  ischemic cardiomyopathy, COPD (not on home O2), HTN, GERD pw fever, n/v, back pain x 1 day. Found to be growing GPC's. S/p ICD replacement procedure, had to be externally defibrillated as ICD initially not firing, wires adjusted and working/Pt initially admitted to MICU for management of hypotension requiring pressor support. While in ICU experienced emesis and diarrhea (without melena/hematochezia) x 1 with SOB. Then transferred to floor after short stay in MICU. Repeated blood culture now negative and s/p PICC for total of 6 weeks of ABx as per ID. Incidental finding of kidney lesion was addressed with Renal. s/p Renal US with mass consistent with a cyst, and severe R hydro. Per Renal, R hydro is chronic and appears to be stable. To be further worked up as opt. Patient remains stable for DC with close follow up with PCP, EP, ID as per Dr. Sow. \par \par Patient comes for follow up blood cultures\par Pt to follow up with his internist regarding his hydro and renal function and fluid status \par pt will require GI follow up\par Primary care will also address the diabetes

## 2020-11-03 LAB
BACTERIA BLD CULT: NORMAL
BACTERIA BLD CULT: NORMAL

## 2020-11-04 ENCOUNTER — NON-APPOINTMENT (OUTPATIENT)
Age: 60
End: 2020-11-04

## 2020-11-16 ENCOUNTER — APPOINTMENT (OUTPATIENT)
Dept: INFECTIOUS DISEASE | Facility: CLINIC | Age: 60
End: 2020-11-16

## 2020-11-26 NOTE — ED ADULT NURSE NOTE - CHIEF COMPLAINT QUOTE
fever sob since yesterday; temp 102; saw MD last week; given diuretic for "fluid in lung"; comes in worsening symptoms.
Other Specify

## 2021-01-04 ENCOUNTER — APPOINTMENT (OUTPATIENT)
Dept: ELECTROPHYSIOLOGY | Facility: CLINIC | Age: 61
End: 2021-01-04

## 2021-01-21 ENCOUNTER — APPOINTMENT (OUTPATIENT)
Dept: ELECTROPHYSIOLOGY | Facility: CLINIC | Age: 61
End: 2021-01-21

## 2021-03-04 ENCOUNTER — APPOINTMENT (OUTPATIENT)
Dept: ELECTROPHYSIOLOGY | Facility: CLINIC | Age: 61
End: 2021-03-04
Payer: MEDICARE

## 2021-03-04 ENCOUNTER — NON-APPOINTMENT (OUTPATIENT)
Age: 61
End: 2021-03-04

## 2021-03-04 VITALS — SYSTOLIC BLOOD PRESSURE: 113 MMHG | DIASTOLIC BLOOD PRESSURE: 75 MMHG | OXYGEN SATURATION: 98 % | HEART RATE: 96 BPM

## 2021-03-04 PROCEDURE — 93260 PRGRMG DEV EVAL IMPLTBL SYS: CPT

## 2021-04-05 ENCOUNTER — APPOINTMENT (OUTPATIENT)
Dept: ELECTROPHYSIOLOGY | Facility: CLINIC | Age: 61
End: 2021-04-05

## 2021-06-10 ENCOUNTER — APPOINTMENT (OUTPATIENT)
Dept: ELECTROPHYSIOLOGY | Facility: CLINIC | Age: 61
End: 2021-06-10
Payer: MEDICARE

## 2021-06-10 ENCOUNTER — NON-APPOINTMENT (OUTPATIENT)
Age: 61
End: 2021-06-10

## 2021-06-10 PROCEDURE — 93296 REM INTERROG EVL PM/IDS: CPT

## 2021-06-10 PROCEDURE — 93295 DEV INTERROG REMOTE 1/2/MLT: CPT

## 2021-09-02 NOTE — CHART NOTE - NSCHARTNOTEFT_GEN_A_CORE
Notified by RN of 8 beats of WCT.   Pt was sleeping, asymptomatic. Denies weakness, headaches, dizziness, syncope, chest pain, palpitations, SOB, dyspnea, abdominal pain, N/V/D.   VSS. Physical exam benign.  F/u AM BMP, mag, phos  Discussed with RN  Will continue to monitor  Will endorse to AM team      Elma Boss PA-C  #[id number] Complex Repair And W Plasty Text: The defect edges were debeveled with a #15 scalpel blade.  The primary defect was closed partially with a complex linear closure.  Given the location of the remaining defect, shape of the defect and the proximity to free margins a W plasty was deemed most appropriate for complete closure of the defect.  Using a sterile surgical marker, an appropriate advancement flap was drawn incorporating the defect and placing the expected incisions within the relaxed skin tension lines where possible.    The area thus outlined was incised deep to adipose tissue with a #15 scalpel blade.  The skin margins were undermined to an appropriate distance in all directions utilizing iris scissors.

## 2021-09-27 ENCOUNTER — NON-APPOINTMENT (OUTPATIENT)
Age: 61
End: 2021-09-27

## 2021-09-27 ENCOUNTER — APPOINTMENT (OUTPATIENT)
Dept: ELECTROPHYSIOLOGY | Facility: CLINIC | Age: 61
End: 2021-09-27
Payer: MEDICARE

## 2021-09-27 PROCEDURE — 93296 REM INTERROG EVL PM/IDS: CPT

## 2021-09-27 PROCEDURE — 93295 DEV INTERROG REMOTE 1/2/MLT: CPT

## 2022-01-17 ENCOUNTER — INPATIENT (INPATIENT)
Facility: HOSPITAL | Age: 62
LOS: 7 days | Discharge: HOME HEALTH | DRG: 854 | End: 2022-01-25
Attending: INTERNAL MEDICINE | Admitting: INTERNAL MEDICINE
Payer: MEDICARE

## 2022-01-17 VITALS
DIASTOLIC BLOOD PRESSURE: 55 MMHG | HEIGHT: 77 IN | RESPIRATION RATE: 18 BRPM | HEART RATE: 84 BPM | WEIGHT: 263.89 LBS | SYSTOLIC BLOOD PRESSURE: 96 MMHG | TEMPERATURE: 99 F | OXYGEN SATURATION: 98 %

## 2022-01-17 DIAGNOSIS — E11.621 TYPE 2 DIABETES MELLITUS WITH FOOT ULCER: ICD-10-CM

## 2022-01-17 DIAGNOSIS — Z98.52 VASECTOMY STATUS: Chronic | ICD-10-CM

## 2022-01-17 DIAGNOSIS — R09.89 OTHER SPECIFIED SYMPTOMS AND SIGNS INVOLVING THE CIRCULATORY AND RESPIRATORY SYSTEMS: ICD-10-CM

## 2022-01-17 LAB
ALBUMIN SERPL ELPH-MCNC: 4 G/DL — SIGNIFICANT CHANGE UP (ref 3.3–5)
ALP SERPL-CCNC: 98 U/L — SIGNIFICANT CHANGE UP (ref 40–120)
ALT FLD-CCNC: 15 U/L — SIGNIFICANT CHANGE UP (ref 10–45)
ANION GAP SERPL CALC-SCNC: 16 MMOL/L — SIGNIFICANT CHANGE UP (ref 5–17)
AST SERPL-CCNC: 15 U/L — SIGNIFICANT CHANGE UP (ref 10–40)
BASOPHILS # BLD AUTO: 0.03 K/UL — SIGNIFICANT CHANGE UP (ref 0–0.2)
BASOPHILS NFR BLD AUTO: 0.2 % — SIGNIFICANT CHANGE UP (ref 0–2)
BILIRUB SERPL-MCNC: 0.7 MG/DL — SIGNIFICANT CHANGE UP (ref 0.2–1.2)
BUN SERPL-MCNC: 67 MG/DL — HIGH (ref 7–23)
CALCIUM SERPL-MCNC: 9.6 MG/DL — SIGNIFICANT CHANGE UP (ref 8.4–10.5)
CHLORIDE SERPL-SCNC: 89 MMOL/L — LOW (ref 96–108)
CO2 SERPL-SCNC: 25 MMOL/L — SIGNIFICANT CHANGE UP (ref 22–31)
CREAT SERPL-MCNC: 2.49 MG/DL — HIGH (ref 0.5–1.3)
EOSINOPHIL # BLD AUTO: 0.08 K/UL — SIGNIFICANT CHANGE UP (ref 0–0.5)
EOSINOPHIL NFR BLD AUTO: 0.6 % — SIGNIFICANT CHANGE UP (ref 0–6)
GAS PNL BLDV: SIGNIFICANT CHANGE UP
GAS PNL BLDV: SIGNIFICANT CHANGE UP
GLUCOSE SERPL-MCNC: 254 MG/DL — HIGH (ref 70–99)
HCT VFR BLD CALC: 39.6 % — SIGNIFICANT CHANGE UP (ref 39–50)
HGB BLD-MCNC: 12.2 G/DL — LOW (ref 13–17)
IMM GRANULOCYTES NFR BLD AUTO: 0.7 % — SIGNIFICANT CHANGE UP (ref 0–1.5)
LYMPHOCYTES # BLD AUTO: 0.95 K/UL — LOW (ref 1–3.3)
LYMPHOCYTES # BLD AUTO: 7 % — LOW (ref 13–44)
MAGNESIUM SERPL-MCNC: 2.3 MG/DL — SIGNIFICANT CHANGE UP (ref 1.6–2.6)
MCHC RBC-ENTMCNC: 23.6 PG — LOW (ref 27–34)
MCHC RBC-ENTMCNC: 30.8 GM/DL — LOW (ref 32–36)
MCV RBC AUTO: 76.6 FL — LOW (ref 80–100)
MONOCYTES # BLD AUTO: 1.5 K/UL — HIGH (ref 0–0.9)
MONOCYTES NFR BLD AUTO: 11.1 % — SIGNIFICANT CHANGE UP (ref 2–14)
NEUTROPHILS # BLD AUTO: 10.87 K/UL — HIGH (ref 1.8–7.4)
NEUTROPHILS NFR BLD AUTO: 80.4 % — HIGH (ref 43–77)
NRBC # BLD: 0 /100 WBCS — SIGNIFICANT CHANGE UP (ref 0–0)
PHOSPHATE SERPL-MCNC: 3.8 MG/DL — SIGNIFICANT CHANGE UP (ref 2.5–4.5)
PLATELET # BLD AUTO: 229 K/UL — SIGNIFICANT CHANGE UP (ref 150–400)
POTASSIUM SERPL-MCNC: 3.3 MMOL/L — LOW (ref 3.5–5.3)
POTASSIUM SERPL-SCNC: 3.3 MMOL/L — LOW (ref 3.5–5.3)
PROT SERPL-MCNC: 8.2 G/DL — SIGNIFICANT CHANGE UP (ref 6–8.3)
RBC # BLD: 5.17 M/UL — SIGNIFICANT CHANGE UP (ref 4.2–5.8)
RBC # FLD: 15.3 % — HIGH (ref 10.3–14.5)
SARS-COV-2 RNA SPEC QL NAA+PROBE: SIGNIFICANT CHANGE UP
SODIUM SERPL-SCNC: 130 MMOL/L — LOW (ref 135–145)
WBC # BLD: 13.53 K/UL — HIGH (ref 3.8–10.5)
WBC # FLD AUTO: 13.53 K/UL — HIGH (ref 3.8–10.5)

## 2022-01-17 PROCEDURE — 73610 X-RAY EXAM OF ANKLE: CPT | Mod: 26,LT

## 2022-01-17 PROCEDURE — 99285 EMERGENCY DEPT VISIT HI MDM: CPT | Mod: GC

## 2022-01-17 PROCEDURE — 73630 X-RAY EXAM OF FOOT: CPT | Mod: 26,LT

## 2022-01-17 PROCEDURE — 99223 1ST HOSP IP/OBS HIGH 75: CPT

## 2022-01-17 PROCEDURE — 73590 X-RAY EXAM OF LOWER LEG: CPT | Mod: 26,LT

## 2022-01-17 PROCEDURE — 93971 EXTREMITY STUDY: CPT | Mod: 26,LT

## 2022-01-17 PROCEDURE — 93010 ELECTROCARDIOGRAM REPORT: CPT

## 2022-01-17 RX ORDER — MUPIROCIN 20 MG/G
1 OINTMENT TOPICAL
Refills: 0 | Status: DISCONTINUED | OUTPATIENT
Start: 2022-01-17 | End: 2022-01-21

## 2022-01-17 RX ORDER — VANCOMYCIN HCL 1 G
1000 VIAL (EA) INTRAVENOUS ONCE
Refills: 0 | Status: COMPLETED | OUTPATIENT
Start: 2022-01-17 | End: 2022-01-17

## 2022-01-17 RX ORDER — SODIUM CHLORIDE 9 MG/ML
2000 INJECTION INTRAMUSCULAR; INTRAVENOUS; SUBCUTANEOUS ONCE
Refills: 0 | Status: COMPLETED | OUTPATIENT
Start: 2022-01-17 | End: 2022-01-17

## 2022-01-17 RX ORDER — PIPERACILLIN AND TAZOBACTAM 4; .5 G/20ML; G/20ML
3.38 INJECTION, POWDER, LYOPHILIZED, FOR SOLUTION INTRAVENOUS ONCE
Refills: 0 | Status: COMPLETED | OUTPATIENT
Start: 2022-01-17 | End: 2022-01-17

## 2022-01-17 RX ADMIN — Medication 100 MILLIGRAM(S): at 15:13

## 2022-01-17 RX ADMIN — Medication 250 MILLIGRAM(S): at 16:00

## 2022-01-17 RX ADMIN — PIPERACILLIN AND TAZOBACTAM 200 GRAM(S): 4; .5 INJECTION, POWDER, LYOPHILIZED, FOR SOLUTION INTRAVENOUS at 15:29

## 2022-01-17 RX ADMIN — SODIUM CHLORIDE 2000 MILLILITER(S): 9 INJECTION INTRAMUSCULAR; INTRAVENOUS; SUBCUTANEOUS at 15:29

## 2022-01-17 RX ADMIN — Medication 600 MILLIGRAM(S): at 15:29

## 2022-01-17 NOTE — ED PROVIDER NOTE - PHYSICAL EXAMINATION
Physical Exam:  General: NAD, Conversive  Eyes: EOMI, Conjunctiva and sclera clear  Neck: No JVD  Lungs: breathing well in no respiratory distress  Heart: well perfused, normal cap refill  Abdomen: Soft, nontender, distended in setting of morbid obesity, no CVA tenderness  Extremities: 2+ peripheral pulses, 2+ edema L. LE, erythema to mid-shin, mild tenderness on L. calf.  Psych: AAO X3  Neurologic: Non-focal

## 2022-01-17 NOTE — ED PROVIDER NOTE - OBJECTIVE STATEMENT
61 Y M H/O HTN,  CAD, DM, presenting with diabetic ulcer. States 5 weeks ago experienced diabetic ulcer post-bunion procedure. Since then experienced increasing swelling of foot, rigors, vomiting, failed trial of levafloxacin, started on clindamycin several days ago with no improvement. Today experienced acute increase in swelling from mid ankle to mid calf with redness, difficulty ambulating due to pain. Denies any fever, abdominal pain, diarrhea, CP, SOB.

## 2022-01-17 NOTE — H&P ADULT - PROBLEM SELECTOR PLAN 6
BP currently soft, with SBP ~90s   likely 2/2 sepsis   holding all home antiHTN and diuretics  - resume as tolerated   routine BP monitoring

## 2022-01-17 NOTE — H&P ADULT - NSHPPHYSICALEXAM_GEN_ALL_CORE
Vital Signs Last 24 Hrs  T(C): 37.7 (17 Jan 2022 21:17), Max: 37.7 (17 Jan 2022 21:17)  T(F): 99.8 (17 Jan 2022 21:17), Max: 99.8 (17 Jan 2022 21:17)  HR: 104 (17 Jan 2022 21:17) (84 - 108)  BP: 98/63 (17 Jan 2022 21:17) (90/62 - 99/61)  BP(mean): --  RR: 17 (17 Jan 2022 21:17) (17 - 18)  SpO2: 94% (17 Jan 2022 21:17) (94% - 99%) Vital Signs Last 24 Hrs  T(C): 37.7 (17 Jan 2022 21:17), Max: 37.7 (17 Jan 2022 21:17)  T(F): 99.8 (17 Jan 2022 21:17), Max: 99.8 (17 Jan 2022 21:17)  HR: 104 (17 Jan 2022 21:17) (84 - 108)  BP: 98/63 (17 Jan 2022 21:17) (90/62 - 99/61)  BP(mean): --  RR: 17 (17 Jan 2022 21:17) (17 - 18)  SpO2: 94% (17 Jan 2022 21:17) (94% - 99%)    PHYSICAL EXAM:  GENERAL: NAD, well-developed  HEAD:  Atraumatic, normocephalic  EYES: EOMI, conjunctiva and sclera clear  NECK: Supple, no JVD  CHEST/LUNG: Clear to auscultation bilaterally; no wheezing or rales  HEART: Regular rate and rhythm; no murmurs  ABDOMEN: Soft, nontender, nondistended; bowel sounds present  EXTREMITIES:  2+ Peripheral Pulses, 2-3+ pitting edema LLE to below knee, +warmth, +erythema to mid calf   PSYCH: calm affect, not anxious  NEUROLOGY: non-focal, AAOx3  SKIN: +erythema to mid calf LLE; +L foot wound below great toe   MUSCULOSKELETAL: no back pain, moving all extremities

## 2022-01-17 NOTE — ED ADULT NURSE NOTE - OBJECTIVE STATEMENT
62yo M aaox4 h/o DM on insulin, x2 cardiac stents on plavix, presents ambulatory from home to ED w/ c/o L foot pain, chills, vomit, as per pt about 5 weeks ago started w/ L foot diabetic ulcer, after bunion procedure. Pt f/u w/ic which prescriptive ABX w/o any relief, As per pt since yesterday got worsening on swelling, pain. Upon assessment L foot swelling, L calf redness, Ulcer in on the plantar area, cover w/ gauze, bleeding minimally., decreased sensation , limited ROM, painful, warm to touch, <2 cap refill, +pulses. On examination Pt denies CP, SOB, HA, vision changes, n/v/d, fevers,  abdominal pain, weakness,  symptoms,. Safety and comfort measures initiated- bed placed in lowest position and side rails raised.

## 2022-01-17 NOTE — ED PROVIDER NOTE - ATTENDING CONTRIBUTION TO CARE
attending Quinten: 61yM h/o HTN, CAD, DM p/w worsening diabetic foot ulcer to L foot. Pt with ulcer after bunion surgery. Now with increased swelling up foot and lower leg, failed trial of levofloxacin, started on clindamycin several days ago with no improvement. Today experienced acute increase in swelling from mid ankle to mid calf with redness, difficulty ambulating due to pain. Will obtain labs including ESR/CRP, blood cultures, xrays foot, LE duplex eval for DVT, IV abx, podiatry eval in ED, admit

## 2022-01-17 NOTE — CONSULT NOTE ADULT - SUBJECTIVE AND OBJECTIVE BOX
Podiatry pager #: [phone]/ [phone]    Patient is a 61y old  Male who presents with a chief complaint of     HPI:      PAST MEDICAL & SURGICAL HISTORY:  Stented coronary artery    Diabetes    AICD (automatic cardioverter/defibrillator) present    Hypertension    Heart failure with reduced ejection fraction    History of ischemic cardiomyopathy    History of COPD    H/O gastroesophageal reflux (GERD)    H/O vasectomy  20 yrs ago (2000)        MEDICATIONS  (STANDING):    MEDICATIONS  (PRN):      Allergies    No Known Allergies    Intolerances        VITALS:    Vital Signs Last 24 Hrs  T(C): 37.7 (17 Jan 2022 21:17), Max: 37.7 (17 Jan 2022 21:17)  T(F): 99.8 (17 Jan 2022 21:17), Max: 99.8 (17 Jan 2022 21:17)  HR: 104 (17 Jan 2022 21:17) (84 - 108)  BP: 98/63 (17 Jan 2022 21:17) (90/62 - 99/61)  BP(mean): --  RR: 17 (17 Jan 2022 21:17) (17 - 18)  SpO2: 94% (17 Jan 2022 21:17) (94% - 99%)    LABS:                          12.2   13.53 )-----------( 229      ( 17 Jan 2022 15:04 )             39.6       01-17    130<L>  |  89<L>  |  67<H>  ----------------------------<  254<H>  3.3<L>   |  25  |  2.49<H>    Ca    9.6      17 Jan 2022 15:04  Phos  3.8     01-17  Mg     2.3     01-17    TPro  8.2  /  Alb  4.0  /  TBili  0.7  /  DBili  x   /  AST  15  /  ALT  15  /  AlkPhos  98  01-17      CAPILLARY BLOOD GLUCOSE              LOWER EXTREMITY PHYSICAL EXAM:    Vasular: DP/PT 2/4, B/L, CFT <3 seconds B/L, Temperature gradient warm to warm on the L and warm to cool on the R,   Neuro: Epicritic sensation diminished to the level of ankle, B/L.  Musculoskeletal/Ortho: unremarkable  Skin: Left foot submet 3 wound to bone, fibrogranular wound bed, no drainage/ purulence noted, no malodor, no tracking, left foot forefoot erythema extending to mid calf w/ increased edema to LLE      RADIOLOGY & ADDITIONAL STUDIES:    < from: Xray Foot AP + Lateral + Oblique, Left (01.17.22 @ 15:46) >    ACC: [id number] EXAM:  XR FOOT COMP MIN 3 VIEWS LT                        ACC: [id number] EXAM:  XR ANKLE COMP MIN 3 VIEWS LT                        ACC: [id number] EXAM:  XR TIB FIB AP LAT 2 VIEWS LT                          PROCEDURE DATE:  01/17/2022        INTERPRETATION:  CLINICAL INDICATION: Cellulitis. Assess for necrotizing   fasciitis.    TECHNIQUE: Frontal and lateral views of the left tibia-fibula, ankle, and   frontal, lateral, and oblique views of the left foot.    COMPARISON: There are no similar prior studies available for comparison..    FINDINGS:  Few scattered soft tissue calcifications within the lower leg. There is   soft tissue swelling about the ankle and proximal foot. No subcutaneous   tracking air is seen.  There are noacute fractures or dislocations.  The joint spaces are preserved with smooth articular margins.  The ankle mortise is congruent and the talar dome is smooth and intact.  There are no destructive osseous lesions or periosteal reaction.    IMPRESSION:  Soft tissue swelling about the ankle and proximal foot, without   subcutaneous emphysema or radiographic evidence of necrotizing fasciitis.    No acute fracture or dislocation.    --- End of Report ---          JOSE DE JESUS BAEZ MD; Resident Radiologist  This document has been electronically signed.  NANCY WOODALL MD; Attending Radiologist  This document has been electronically signed. Jan 17 2022  4:32PM    < end of copied text >

## 2022-01-17 NOTE — H&P ADULT - NSHPREVIEWOFSYSTEMS_GEN_ALL_CORE
REVIEW OF SYSTEMS:    CONSTITUTIONAL: No fevers, +chills  EYES/ENT: No visual changes;  no throat pain   NECK: No pain or stiffness  RESPIRATORY: No cough, no shortness of breath  CARDIOVASCULAR: No chest pain or palpitations  GASTROINTESTINAL: +nausea, +vomiting, no abdominal pain, no BRBPR  GENITOURINARY: no polyuria, no dysuria  NEUROLOGICAL: no numbness, no headaches, no confusion   MUSCULOSKELETAL: no back pain, no focal weakness, +L foot pain as above    SKIN: +erythema and swelling, no itching or lesions   PSYCH: no anxiety, depression  HEME: no gum bleeding, no bruising

## 2022-01-17 NOTE — ED PROVIDER NOTE - NS ED ROS FT
GENERAL: No fever + chills  EYES: No change in vision  HEENT: No trouble swallowing or speaking  CARDIAC: No chest pain  PULMONARY: No cough or SOB  GI: No abdominal pain, + nausea + vomiting, no diarrhea or constipation  : No changes in urination  SKIN: No rashes  NEURO: No headache, no numbness  MSK: + pain over L. ankle, pain with ambulation  Otherwise as HPI or negative.

## 2022-01-17 NOTE — H&P ADULT - NSHPLABSRESULTS_GEN_ALL_CORE
Labs, imaging and EKG personally reviewed and interpreted by me.                           12.2   13.53 )-----------( 229      ( 17 Jan 2022 15:04 )             39.6     01-17    130<L>  |  89<L>  |  67<H>  ----------------------------<  254<H>  3.3<L>   |  25  |  2.49<H>    Ca    9.6      17 Jan 2022 15:04  Phos  3.8     01-17  Mg     2.3     01-17    TPro  8.2  /  Alb  4.0  /  TBili  0.7  /  DBili  x   /  AST  15  /  ALT  15  /  AlkPhos  98  01-17        < from: Xray Tibia + Fibula 2 Views, Left (01.17.22 @ 15:46) >      IMPRESSION:  Soft tissue swelling about the ankle and proximal foot, without   subcutaneous emphysema or radiographic evidence of necrotizing fasciitis.    No acute fracture or dislocation.    < end of copied text >    < from: VA Duplex Lower Ext Vein Scan, Left (01.17.22 @ 20:05) >    IMPRESSION:  No evidence of left lower extremity deep venous thrombosis.    < end of copied text >

## 2022-01-17 NOTE — H&P ADULT - PROBLEM SELECTOR PLAN 5
check A1c  resume home dose insulin - lantus 60units with bedtime and admelog sliding scale with meals   monitor FS ac and hs   titrate as needed

## 2022-01-17 NOTE — H&P ADULT - PROBLEM SELECTOR PLAN 2
septic with tachycardia and leukocytosis with elevated lactate 2.5 in setting of cellulitis/OM of LLE   - s/p 2L IVF in ED with improvement of lactate   - c/w abx as above   - f/u all cx data  - defer further IVF 2/2 HF   - mentating well, extremities warm/perfused

## 2022-01-17 NOTE — ED ADULT NURSE NOTE - ED STAT RN HANDOFF DETAILS 2
Handoff given to Herman Mchugh RN. pt a&ox4, MRI checklist given. No complaints or distress noted at this time.

## 2022-01-17 NOTE — H&P ADULT - PROBLEM SELECTOR PLAN 1
L foot submetatarsal wound not improving no PO abx, now with worsening swelling and erythema tracking proximally, c/w cellulitis.  and   - podiatry following with wound tracking to bone, c/f possible OM   - will c/w vanco and zosyn for now - will need to monitor Cr closely 2/2 nephrotoxicity in setting of MANJIT   - f/u blood and wound cx  - XR foot reviewed - no gas or OM; will obtain MRI in AM to further assess for OM  - further podiatric plans pending imaging   - mupirocin per podiatry   - ID consult in AM

## 2022-01-17 NOTE — ED PROVIDER NOTE - PROGRESS NOTE DETAILS
Ab Tariq MD:  Podiatry consulted, will see patient. Attending MD Guevara.  PT signed out to me in stable condition pending XR, US, podiatry, TBA, 62 yo male with foot wound, failed outpt tx TBA to medicine. Attending MD Guevara.  PT accepted for admission by Dr. Mcgovern.

## 2022-01-17 NOTE — ED PROVIDER NOTE - CLINICAL SUMMARY MEDICAL DECISION MAKING FREE TEXT BOX
61Y M H/O DM, presenting with diabetic ulcer, primary concern for cellulitis vs. osteomyelitis, vs. necretizing fascitis (well appeawring; however, progression within the last 24 hours is acute), vs. DVT in setting of underlying infection (no clinical signs of PE (Tachycarida, CP, SOB, Hypoxia, syncope). Common labs, xray, clindamycin for treatment of necrotizing fascititis with broad spectrum coverage, admission, podiatry consult.

## 2022-01-17 NOTE — H&P ADULT - HISTORY OF PRESENT ILLNESS
61M with PMH of HTN, CAD/MI s/p stents, HFrEF (EF 15-20%) s/p AICD, T2DM on insulin p/w foot ulcer. Pt had bunionectomy of L foot approx 5 weeks ago with poor wound healing since that time. In the past 2 weeks had started noticing increasing pain, swelling and erythema at site of the wound and was started on levaquin in 1/4/21 x 10 days without any improvement in symptoms. Was started on clindamycin 2-3 days ago without any improvement. Pt states he has 5/10 intermittent sharp pain at site of the wound and initially had only localized swelling and erythema; however, in past 24 hours started noticing the swelling and erythema moving proximally, now with swelling to his knees and erythema to mid calf. Pain has also worsened but remains localized, non radiating but worse with weight bearing or ambulation; has been taking Tylenol or Advil for pain with some relief. Endorses minimal clear discharge from wound. Went to see podiatrist today who referred him to ED for further evaluation. Denies any fevers, +chills ongoing x weeks, +1 episode of nausea with vomiting this morning, no abd pain, no CP, SOB, no GI or  symptoms.

## 2022-01-17 NOTE — H&P ADULT - ASSESSMENT
61M with PMH of HTN, CAD/MI s/p stents, HFrEF (EF 15-20%) s/p AICD, T2DM on insulin p/w non healing foot ulcer after bunionectomy 5weeks ago, now with acutely worsening pain, swelling and erythema, a/w sepsis 2/2 cellulitis and r/o OM.

## 2022-01-17 NOTE — H&P ADULT - PROBLEM SELECTOR PLAN 3
MANJIT vs CKD, Cr 2.5 with last baseline ~1.5 (in 2020) - possibly in setting of sepsis   - check urine lytes  - holding entresto and lasix for now   - s/p 2L in ED, will defer further IVF 2/2 HF with EF 15-20%  - strict I/Os  - consider renal consult

## 2022-01-17 NOTE — H&P ADULT - PROBLEM SELECTOR PLAN 4
h/o HFrEF with EF 15-20%, s/p AICD  currently appears euvolemic   holding lasix, Entresto, BB and indapamide for hypotension and MANJIT   monitor volume status, resume as tolerated   follows with Dr Adan

## 2022-01-18 DIAGNOSIS — E11.9 TYPE 2 DIABETES MELLITUS WITHOUT COMPLICATIONS: ICD-10-CM

## 2022-01-18 DIAGNOSIS — L03.116 CELLULITIS OF LEFT LOWER LIMB: ICD-10-CM

## 2022-01-18 DIAGNOSIS — I50.22 CHRONIC SYSTOLIC (CONGESTIVE) HEART FAILURE: ICD-10-CM

## 2022-01-18 DIAGNOSIS — E11.628 TYPE 2 DIABETES MELLITUS WITH OTHER SKIN COMPLICATIONS: ICD-10-CM

## 2022-01-18 DIAGNOSIS — I25.10 ATHEROSCLEROTIC HEART DISEASE OF NATIVE CORONARY ARTERY WITHOUT ANGINA PECTORIS: ICD-10-CM

## 2022-01-18 DIAGNOSIS — A41.9 SEPSIS, UNSPECIFIED ORGANISM: ICD-10-CM

## 2022-01-18 DIAGNOSIS — N17.9 ACUTE KIDNEY FAILURE, UNSPECIFIED: ICD-10-CM

## 2022-01-18 DIAGNOSIS — Z29.9 ENCOUNTER FOR PROPHYLACTIC MEASURES, UNSPECIFIED: ICD-10-CM

## 2022-01-18 DIAGNOSIS — D72.829 ELEVATED WHITE BLOOD CELL COUNT, UNSPECIFIED: ICD-10-CM

## 2022-01-18 DIAGNOSIS — I10 ESSENTIAL (PRIMARY) HYPERTENSION: ICD-10-CM

## 2022-01-18 LAB
A1C WITH ESTIMATED AVERAGE GLUCOSE RESULT: 14 % — HIGH (ref 4–5.6)
ESTIMATED AVERAGE GLUCOSE: 355 MG/DL — HIGH (ref 68–114)
GLUCOSE BLDC GLUCOMTR-MCNC: 246 MG/DL — HIGH (ref 70–99)
GLUCOSE BLDC GLUCOMTR-MCNC: 294 MG/DL — HIGH (ref 70–99)
GLUCOSE BLDC GLUCOMTR-MCNC: 326 MG/DL — HIGH (ref 70–99)
GLUCOSE BLDC GLUCOMTR-MCNC: 347 MG/DL — HIGH (ref 70–99)
GLUCOSE BLDC GLUCOMTR-MCNC: 365 MG/DL — HIGH (ref 70–99)

## 2022-01-18 PROCEDURE — 99223 1ST HOSP IP/OBS HIGH 75: CPT

## 2022-01-18 RX ORDER — DEXTROSE 50 % IN WATER 50 %
25 SYRINGE (ML) INTRAVENOUS ONCE
Refills: 0 | Status: DISCONTINUED | OUTPATIENT
Start: 2022-01-18 | End: 2022-01-21

## 2022-01-18 RX ORDER — GLYCERIN 1 %
0 DROPS OPHTHALMIC (EYE)
Qty: 0 | Refills: 0 | DISCHARGE

## 2022-01-18 RX ORDER — INSULIN LISPRO 100/ML
VIAL (ML) SUBCUTANEOUS
Refills: 0 | Status: DISCONTINUED | OUTPATIENT
Start: 2022-01-18 | End: 2022-01-21

## 2022-01-18 RX ORDER — INSULIN LISPRO 100/ML
VIAL (ML) SUBCUTANEOUS AT BEDTIME
Refills: 0 | Status: DISCONTINUED | OUTPATIENT
Start: 2022-01-18 | End: 2022-01-21

## 2022-01-18 RX ORDER — ONDANSETRON 8 MG/1
4 TABLET, FILM COATED ORAL EVERY 8 HOURS
Refills: 0 | Status: DISCONTINUED | OUTPATIENT
Start: 2022-01-18 | End: 2022-01-21

## 2022-01-18 RX ORDER — LANOLIN ALCOHOL/MO/W.PET/CERES
3 CREAM (GRAM) TOPICAL AT BEDTIME
Refills: 0 | Status: DISCONTINUED | OUTPATIENT
Start: 2022-01-18 | End: 2022-01-21

## 2022-01-18 RX ORDER — DEXTROSE 50 % IN WATER 50 %
15 SYRINGE (ML) INTRAVENOUS ONCE
Refills: 0 | Status: DISCONTINUED | OUTPATIENT
Start: 2022-01-18 | End: 2022-01-21

## 2022-01-18 RX ORDER — CEFEPIME 1 G/1
1000 INJECTION, POWDER, FOR SOLUTION INTRAMUSCULAR; INTRAVENOUS DAILY
Refills: 0 | Status: DISCONTINUED | OUTPATIENT
Start: 2022-01-18 | End: 2022-01-19

## 2022-01-18 RX ORDER — SODIUM CHLORIDE 9 MG/ML
1000 INJECTION, SOLUTION INTRAVENOUS
Refills: 0 | Status: DISCONTINUED | OUTPATIENT
Start: 2022-01-18 | End: 2022-01-21

## 2022-01-18 RX ORDER — VANCOMYCIN HCL 1 G
1000 VIAL (EA) INTRAVENOUS EVERY 24 HOURS
Refills: 0 | Status: DISCONTINUED | OUTPATIENT
Start: 2022-01-18 | End: 2022-01-19

## 2022-01-18 RX ORDER — ACETAMINOPHEN 500 MG
1000 TABLET ORAL ONCE
Refills: 0 | Status: COMPLETED | OUTPATIENT
Start: 2022-01-18 | End: 2022-01-18

## 2022-01-18 RX ORDER — ATORVASTATIN CALCIUM 80 MG/1
80 TABLET, FILM COATED ORAL AT BEDTIME
Refills: 0 | Status: DISCONTINUED | OUTPATIENT
Start: 2022-01-18 | End: 2022-01-21

## 2022-01-18 RX ORDER — ACETAMINOPHEN 500 MG
650 TABLET ORAL EVERY 6 HOURS
Refills: 0 | Status: DISCONTINUED | OUTPATIENT
Start: 2022-01-18 | End: 2022-01-21

## 2022-01-18 RX ORDER — INSULIN GLARGINE 100 [IU]/ML
74 INJECTION, SOLUTION SUBCUTANEOUS
Qty: 0 | Refills: 0 | DISCHARGE

## 2022-01-18 RX ORDER — ENOXAPARIN SODIUM 100 MG/ML
30 INJECTION SUBCUTANEOUS DAILY
Refills: 0 | Status: DISCONTINUED | OUTPATIENT
Start: 2022-01-18 | End: 2022-01-21

## 2022-01-18 RX ORDER — ASPIRIN/CALCIUM CARB/MAGNESIUM 324 MG
81 TABLET ORAL DAILY
Refills: 0 | Status: DISCONTINUED | OUTPATIENT
Start: 2022-01-18 | End: 2022-01-21

## 2022-01-18 RX ORDER — PANTOPRAZOLE SODIUM 20 MG/1
40 TABLET, DELAYED RELEASE ORAL
Refills: 0 | Status: DISCONTINUED | OUTPATIENT
Start: 2022-01-18 | End: 2022-01-21

## 2022-01-18 RX ORDER — INSULIN GLARGINE 100 [IU]/ML
60 INJECTION, SOLUTION SUBCUTANEOUS AT BEDTIME
Refills: 0 | Status: DISCONTINUED | OUTPATIENT
Start: 2022-01-18 | End: 2022-01-20

## 2022-01-18 RX ORDER — GLUCAGON INJECTION, SOLUTION 0.5 MG/.1ML
1 INJECTION, SOLUTION SUBCUTANEOUS ONCE
Refills: 0 | Status: DISCONTINUED | OUTPATIENT
Start: 2022-01-18 | End: 2022-01-21

## 2022-01-18 RX ORDER — DEXTROSE 50 % IN WATER 50 %
12.5 SYRINGE (ML) INTRAVENOUS ONCE
Refills: 0 | Status: DISCONTINUED | OUTPATIENT
Start: 2022-01-18 | End: 2022-01-21

## 2022-01-18 RX ORDER — VANCOMYCIN HCL 1 G
1750 VIAL (EA) INTRAVENOUS EVERY 24 HOURS
Refills: 0 | Status: DISCONTINUED | OUTPATIENT
Start: 2022-01-18 | End: 2022-01-18

## 2022-01-18 RX ORDER — PIPERACILLIN AND TAZOBACTAM 4; .5 G/20ML; G/20ML
3.38 INJECTION, POWDER, LYOPHILIZED, FOR SOLUTION INTRAVENOUS EVERY 8 HOURS
Refills: 0 | Status: DISCONTINUED | OUTPATIENT
Start: 2022-01-18 | End: 2022-01-18

## 2022-01-18 RX ORDER — CLOPIDOGREL BISULFATE 75 MG/1
75 TABLET, FILM COATED ORAL DAILY
Refills: 0 | Status: DISCONTINUED | OUTPATIENT
Start: 2022-01-18 | End: 2022-01-21

## 2022-01-18 RX ORDER — TAMSULOSIN HYDROCHLORIDE 0.4 MG/1
0.4 CAPSULE ORAL AT BEDTIME
Refills: 0 | Status: DISCONTINUED | OUTPATIENT
Start: 2022-01-18 | End: 2022-01-21

## 2022-01-18 RX ORDER — METOPROLOL TARTRATE 50 MG
50 TABLET ORAL
Refills: 0 | Status: DISCONTINUED | OUTPATIENT
Start: 2022-01-18 | End: 2022-01-18

## 2022-01-18 RX ADMIN — CEFEPIME 100 MILLIGRAM(S): 1 INJECTION, POWDER, FOR SOLUTION INTRAMUSCULAR; INTRAVENOUS at 21:10

## 2022-01-18 RX ADMIN — PANTOPRAZOLE SODIUM 40 MILLIGRAM(S): 20 TABLET, DELAYED RELEASE ORAL at 07:41

## 2022-01-18 RX ADMIN — ENOXAPARIN SODIUM 30 MILLIGRAM(S): 100 INJECTION SUBCUTANEOUS at 09:35

## 2022-01-18 RX ADMIN — Medication 4: at 14:14

## 2022-01-18 RX ADMIN — Medication 400 MILLIGRAM(S): at 01:08

## 2022-01-18 RX ADMIN — TAMSULOSIN HYDROCHLORIDE 0.4 MILLIGRAM(S): 0.4 CAPSULE ORAL at 21:09

## 2022-01-18 RX ADMIN — PIPERACILLIN AND TAZOBACTAM 25 GRAM(S): 4; .5 INJECTION, POWDER, LYOPHILIZED, FOR SOLUTION INTRAVENOUS at 02:50

## 2022-01-18 RX ADMIN — Medication 81 MILLIGRAM(S): at 09:36

## 2022-01-18 RX ADMIN — INSULIN GLARGINE 60 UNIT(S): 100 INJECTION, SOLUTION SUBCUTANEOUS at 21:30

## 2022-01-18 RX ADMIN — Medication 3: at 17:24

## 2022-01-18 RX ADMIN — MUPIROCIN 1 APPLICATION(S): 20 OINTMENT TOPICAL at 08:02

## 2022-01-18 RX ADMIN — ATORVASTATIN CALCIUM 80 MILLIGRAM(S): 80 TABLET, FILM COATED ORAL at 21:09

## 2022-01-18 RX ADMIN — PIPERACILLIN AND TAZOBACTAM 25 GRAM(S): 4; .5 INJECTION, POWDER, LYOPHILIZED, FOR SOLUTION INTRAVENOUS at 09:35

## 2022-01-18 RX ADMIN — CLOPIDOGREL BISULFATE 75 MILLIGRAM(S): 75 TABLET, FILM COATED ORAL at 09:36

## 2022-01-18 RX ADMIN — INSULIN GLARGINE 60 UNIT(S): 100 INJECTION, SOLUTION SUBCUTANEOUS at 01:08

## 2022-01-18 RX ADMIN — Medication 250 MILLIGRAM(S): at 17:22

## 2022-01-18 RX ADMIN — Medication 4: at 07:38

## 2022-01-18 NOTE — PROGRESS NOTE ADULT - ASSESSMENT
61M w/ left foot submet 3 wound and LLE cellulitis   - Patient seen and evaluated   - afebrile, WBC 13.53  -  Left foot submet 3 wound to bone, fibrogranular wound bed, no drainage/ purulence noted, no malodor, no tracking, left foot forefoot erythema extending to mid calf w/ increased edema to LLE  - Left foot MRI ordered   - Tentatively booked for L foot partial 3rd ray resection Friday 1/21 @ 10 30 am pending MRI  - please document medical clearance for surgery under local w/ sedation  - will follow   - Seen with attending

## 2022-01-18 NOTE — CHART NOTE - NSCHARTNOTEFT_GEN_A_CORE
Patient has a MRI compatible S-ICD implanted 9/18/2020. MRI clearance form filled out and placed in chart. MRI is scheduled on 1/19/2022 at 3pm.     DANIEL Hernandes NP-C  [id number]

## 2022-01-18 NOTE — CONSULT NOTE ADULT - SUBJECTIVE AND OBJECTIVE BOX
[facility]  [phone]  NEPHROLOGY      INITIAL CONSULT NOTE  --------------------------------------------------------------------------------  HPI:      61M with PMH of HTN, CAD/MI s/p stents, HFrEF (EF 15-20%) s/p AICD, T2DM on insulin p/w foot ulcer. Pt had bunionectomy of L foot approx 5 weeks ago with poor wound healing since that time. In the past 2 weeks had started noticing increasing pain, swelling and erythema at site of the wound and was started on levaquin in 1/4/21 x 10 days without any improvement in symptoms. Was started on clindamycin 2-3 days ago without any improvement. Pt states he has 5/10 intermittent sharp pain at site of the wound and initially had only localized swelling and erythema; however, in past 24 hours started noticing the swelling and erythema moving proximally, now with swelling to his knees and erythema to mid calf. Pain has also worsened but remains localized, non radiating but worse with weight bearing or ambulation; has been taking Tylenol or Advil for pain with some relief. Endorses minimal clear discharge from wound. Went to see podiatrist today who referred him to ED for further evaluation.    pt also noticed with abn creatinine renal consult called.     PAST HISTORY  --------------------------------------------------------------------------------  PAST MEDICAL & SURGICAL HISTORY:  Stented coronary artery    Diabetes    AICD (automatic cardioverter/defibrillator) present    Hypertension    Heart failure with reduced ejection fraction    History of ischemic cardiomyopathy    History of COPD    H/O gastroesophageal reflux (GERD)    H/O vasectomy  20 yrs ago (2000)      FAMILY HISTORY:  Family history of COPD (chronic obstructive pulmonary disease) (Sibling)    Family history of cardiac disorder  Paternal      PAST SOCIAL HISTORY:    ALLERGIES & MEDICATIONS  --------------------------------------------------------------------------------  Allergies    No Known Allergies    Intolerances      Standing Inpatient Medications  aspirin  chewable 81 milliGRAM(s) Oral daily  atorvastatin 80 milliGRAM(s) Oral at bedtime  cefepime   IVPB 1000 milliGRAM(s) IV Intermittent daily  clopidogrel Tablet 75 milliGRAM(s) Oral daily  dextrose 40% Gel 15 Gram(s) Oral once  dextrose 5%. 1000 milliLiter(s) IV Continuous <Continuous>  dextrose 5%. 1000 milliLiter(s) IV Continuous <Continuous>  dextrose 50% Injectable 25 Gram(s) IV Push once  dextrose 50% Injectable 12.5 Gram(s) IV Push once  dextrose 50% Injectable 25 Gram(s) IV Push once  enoxaparin Injectable 30 milliGRAM(s) SubCutaneous daily  glucagon  Injectable 1 milliGRAM(s) IntraMuscular once  insulin glargine Injectable (LANTUS) 60 Unit(s) SubCutaneous at bedtime  insulin lispro (ADMELOG) corrective regimen sliding scale   SubCutaneous three times a day before meals  insulin lispro (ADMELOG) corrective regimen sliding scale   SubCutaneous at bedtime  mupirocin 2% Ointment 1 Application(s) Topical two times a day  pantoprazole    Tablet 40 milliGRAM(s) Oral before breakfast  tamsulosin 0.4 milliGRAM(s) Oral at bedtime  vancomycin  IVPB 1000 milliGRAM(s) IV Intermittent every 24 hours    PRN Inpatient Medications  acetaminophen     Tablet .. 650 milliGRAM(s) Oral every 6 hours PRN  aluminum hydroxide/magnesium hydroxide/simethicone Suspension 30 milliLiter(s) Oral every 4 hours PRN  melatonin 3 milliGRAM(s) Oral at bedtime PRN  ondansetron Injectable 4 milliGRAM(s) IV Push every 8 hours PRN      REVIEW OF SYSTEMS  --------------------------------------------------------------------------------  Gen: No  fevers/chills   Skin: No rashes  Head/Eyes/Ears/Mouth: No headache; Normal hearing;  No sinus pain/discomfort, sore throat  Respiratory: No dyspnea, cough, wheezing, hemoptysis  CV: No chest pain, orthopnea  GI: No abdominal pain, diarrhea, nausea, vomiting, melena, hematochezia  : No dysuria, decrease urination or hesitancy urinating  hematuria, nocturia  MSK:  left foot ulcer and swelling ; no back pain  Neuro: No dizziness/lightheadedness  also with left leg edema         VITALS/PHYSICAL EXAM  --------------------------------------------------------------------------------  T(C): 36.9 (01-18-22 @ 12:42), Max: 37.7 (01-18-22 @ 01:17)  HR: 97 (01-18-22 @ 12:42) (96 - 103)  BP: 99/64 (01-18-22 @ 12:42) (91/63 - 107/72)  RR: 18 (01-18-22 @ 12:42) (18 - 18)  SpO2: 97% (01-18-22 @ 12:42) (97% - 98%)  Wt(kg): --  Height (cm): 195.6 (01-17-22 @ 12:52)  Weight (kg): 119.7 (01-17-22 @ 12:52)  BMI (kg/m2): 31.3 (01-17-22 @ 12:52)  BSA (m2): 2.52 (01-17-22 @ 12:52)      01-18-22 @ 07:01  -  01-18-22 @ 22:40  --------------------------------------------------------  IN: 300 mL / OUT: 0 mL / NET: 300 mL      Physical Exam:  	Gen: Non toxic comfortable appearing   	no jvd  	Pulm: decrease bs  no rales or ronchi or wheezing  	CV: RRR, S1S2; no rub  	Back: No CVA tenderness  	Abd: +BS, soft, nontender/nondistended  	: No suprapubic tenderness  	UE: Warm, no cyanosis  no clubbing,  no edema; no asterixis  	LE: Warm, left leg edema 2- ; rle no edema left foot dressing   	Neuro: alert and oriented. speech coherent   	    LABS/STUDIES  --------------------------------------------------------------------------------              12.2   13.53 >-----------<  229      [01-17-22 @ 15:04]              39.6     130  |  89  |  67  ----------------------------<  254      [01-17-22 @ 15:04]  3.3   |  25  |  2.49        Ca     9.6     [01-17-22 @ 15:04]      Mg     2.3     [01-17-22 @ 15:04]      Phos  3.8     [01-17-22 @ 15:04]    TPro  8.2  /  Alb  4.0  /  TBili  0.7  /  DBili  x   /  AST  15  /  ALT  15  /  AlkPhos  98  [01-17-22 @ 15:04]          Creatinine Trend:  SCr 2.49 [01-17 @ 15:04]    Urinalysis - [09-09-20 @ 07:23]      Color Light Yellow / Appearance Clear / SG 1.014 / pH 6.0      Gluc Trace / Ketone Negative  / Bili Negative / Urobili Negative       Blood Trace / Protein Trace / Leuk Est Negative / Nitrite Negative      RBC 2 / WBC 0 / Hyaline 4 / Gran  / Sq Epi  / Non Sq Epi 0 / Bacteria Negative      HbA1c 8.9      [12-16-19 @ 08:15]    HCV 0.09, Nonreact      [12-16-19 @ 08:36]

## 2022-01-18 NOTE — CONSULT NOTE ADULT - SUBJECTIVE AND OBJECTIVE BOX
SEVERIANO MARTINEZ 61y Male  MRN-[id number]    Patient is a 61y old  Male who presents with a chief complaint of foot ulcer (18 Jan 2022 10:41)      HPI:  61M with PMH of HTN, CAD/MI s/p stents, HFrEF (EF 15-20%) s/p AICD, T2DM on insulin p/w foot ulcer. Pt had bunionectomy of L foot approx 5 weeks ago with poor wound healing since that time. In the past 2 weeks had started noticing increasing pain, swelling and erythema at site of the wound and was started on levaquin in 1/4/21 x 10 days without any improvement in symptoms. Was started on clindamycin 2-3 days ago without any improvement. Pt states he has 5/10 intermittent sharp pain at site of the wound and initially had only localized swelling and erythema; however, in past 24 hours started noticing the swelling and erythema moving proximally, now with swelling to his knees and erythema to mid calf. Pain has also worsened but remains localized, non radiating but worse with weight bearing or ambulation; has been taking Tylenol or Advil for pain with some relief. Endorses minimal clear discharge from wound. Went to see podiatrist today who referred him to ED for further evaluation. Denies any fevers, +chills ongoing x weeks, +1 episode of nausea with vomiting this morning, no abd pain, no CP, SOB, no GI or  symptoms.  (17 Jan 2022 23:43)    ID called for cellulitis     PAST MEDICAL & SURGICAL HISTORY:  Stented coronary artery    Diabetes    AICD (automatic cardioverter/defibrillator) present    Hypertension    Heart failure with reduced ejection fraction    History of ischemic cardiomyopathy    History of COPD    H/O gastroesophageal reflux (GERD)    H/O vasectomy  20 yrs ago (2000)        Allergies    No Known Allergies    Intolerances        ANTIMICROBIALS:  piperacillin/tazobactam IVPB.. 3.375 every 8 hours  vancomycin  IVPB 1750 every 24 hours      MEDICATIONS  (STANDING):  aspirin  chewable 81 milliGRAM(s) Oral daily  atorvastatin 80 milliGRAM(s) Oral at bedtime  clopidogrel Tablet 75 milliGRAM(s) Oral daily  dextrose 40% Gel 15 Gram(s) Oral once  dextrose 5%. 1000 milliLiter(s) (50 mL/Hr) IV Continuous <Continuous>  dextrose 5%. 1000 milliLiter(s) (100 mL/Hr) IV Continuous <Continuous>  dextrose 50% Injectable 25 Gram(s) IV Push once  dextrose 50% Injectable 12.5 Gram(s) IV Push once  dextrose 50% Injectable 25 Gram(s) IV Push once  enoxaparin Injectable 30 milliGRAM(s) SubCutaneous daily  glucagon  Injectable 1 milliGRAM(s) IntraMuscular once  insulin glargine Injectable (LANTUS) 60 Unit(s) SubCutaneous at bedtime  insulin lispro (ADMELOG) corrective regimen sliding scale   SubCutaneous three times a day before meals  insulin lispro (ADMELOG) corrective regimen sliding scale   SubCutaneous at bedtime  mupirocin 2% Ointment 1 Application(s) Topical two times a day  pantoprazole    Tablet 40 milliGRAM(s) Oral before breakfast  piperacillin/tazobactam IVPB.. 3.375 Gram(s) IV Intermittent every 8 hours  tamsulosin 0.4 milliGRAM(s) Oral at bedtime  vancomycin  IVPB 1750 milliGRAM(s) IV Intermittent every 24 hours      Social History  Smoking:  Etoh:  Drug use:      FAMILY HISTORY:  Family history of COPD (chronic obstructive pulmonary disease) (Sibling)    Family history of cardiac disorder  Paternal        Vital Signs Last 24 Hrs  T(C): 36.8 (18 Jan 2022 08:44), Max: 37.7 (17 Jan 2022 21:17)  T(F): 98.2 (18 Jan 2022 08:44), Max: 99.8 (17 Jan 2022 21:17)  HR: 96 (18 Jan 2022 08:44) (84 - 108)  BP: 91/63 (18 Jan 2022 08:44) (90/62 - 107/72)  BP(mean): 84 (18 Jan 2022 05:59) (84 - 84)  RR: 18 (18 Jan 2022 08:44) (17 - 18)  SpO2: 98% (18 Jan 2022 08:44) (94% - 99%)    CBC Full  -  ( 17 Jan 2022 15:04 )  WBC Count : 13.53 K/uL  RBC Count : 5.17 M/uL  Hemoglobin : 12.2 g/dL  Hematocrit : 39.6 %  Platelet Count - Automated : 229 K/uL  Mean Cell Volume : 76.6 fl  Mean Cell Hemoglobin : 23.6 pg  Mean Cell Hemoglobin Concentration : 30.8 gm/dL  Auto Neutrophil # : 10.87 K/uL  Auto Lymphocyte # : 0.95 K/uL  Auto Monocyte # : 1.50 K/uL  Auto Eosinophil # : 0.08 K/uL  Auto Basophil # : 0.03 K/uL  Auto Neutrophil % : 80.4 %  Auto Lymphocyte % : 7.0 %  Auto Monocyte % : 11.1 %  Auto Eosinophil % : 0.6 %  Auto Basophil % : 0.2 %    01-17    130<L>  |  89<L>  |  67<H>  ----------------------------<  254<H>  3.3<L>   |  25  |  2.49<H>    Ca    9.6      17 Jan 2022 15:04  Phos  3.8     01-17  Mg     2.3     01-17    TPro  8.2  /  Alb  4.0  /  TBili  0.7  /  DBili  x   /  AST  15  /  ALT  15  /  AlkPhos  98  01-17    LIVER FUNCTIONS - ( 17 Jan 2022 15:04 )  Alb: 4.0 g/dL / Pro: 8.2 g/dL / ALK PHOS: 98 U/L / ALT: 15 U/L / AST: 15 U/L / GGT: x               MICROBIOLOGY:          RADIOLOGY  < from: [facility] Duplex Lower Ext Vein Scan, Left (01.17.22 @ 20:05) >  No evidence of left lower extremity deep venous thrombosis.    < end of copied text >  < from: Xray Tibia + Fibula 2 Views, Left (01.17.22 @ 15:46) >  Soft tissue swelling about the ankle and proximal foot, without   subcutaneous emphysema or radiographic evidence of necrotizing fasciitis.    < end of copied text >   SEVERIANO MARTINEZ 61y Male  MRN-[id number]    Patient is a 61y old  Male who presents with a chief complaint of foot ulcer (18 Jan 2022 10:41)      HPI:  61M with PMH of HTN, CAD/MI s/p stents, HFrEF (EF 15-20%) s/p AICD, T2DM on insulin p/w foot ulcer. Pt had bunionectomy of L foot approx 5 weeks ago with poor wound healing since that time. In the past 2 weeks had started noticing increasing pain, swelling and erythema at site of the wound and was started on levaquin in 1/4/21 x 10 days without any improvement in symptoms. Was started on clindamycin 2-3 days ago without any improvement. Pt states he has 5/10 intermittent sharp pain at site of the wound and initially had only localized swelling and erythema; however, in past 24 hours started noticing the swelling and erythema moving proximally, now with swelling to his knees and erythema to mid calf. Pain has also worsened but remains localized, non radiating but worse with weight bearing or ambulation; has been taking Tylenol or Advil for pain with some relief. Endorses minimal clear discharge from wound. Went to see podiatrist today who referred him to ED for further evaluation. Denies any fevers, +chills ongoing x weeks, +1 episode of nausea with vomiting this morning, no abd pain, no CP, SOB, no GI or  symptoms.  (17 Jan 2022 23:43)    ID called for cellulitis     PAST MEDICAL & SURGICAL HISTORY:  Stented coronary artery    Diabetes    AICD (automatic cardioverter/defibrillator) present    Hypertension    Heart failure with reduced ejection fraction    History of ischemic cardiomyopathy    History of COPD    H/O gastroesophageal reflux (GERD)    H/O vasectomy  20 yrs ago (2000)        Allergies    No Known Allergies    Intolerances        ANTIMICROBIALS:  piperacillin/tazobactam IVPB.. 3.375 every 8 hours  vancomycin  IVPB 1750 every 24 hours      MEDICATIONS  (STANDING):  aspirin  chewable 81 milliGRAM(s) Oral daily  atorvastatin 80 milliGRAM(s) Oral at bedtime  clopidogrel Tablet 75 milliGRAM(s) Oral daily  dextrose 40% Gel 15 Gram(s) Oral once  dextrose 5%. 1000 milliLiter(s) (50 mL/Hr) IV Continuous <Continuous>  dextrose 5%. 1000 milliLiter(s) (100 mL/Hr) IV Continuous <Continuous>  dextrose 50% Injectable 25 Gram(s) IV Push once  dextrose 50% Injectable 12.5 Gram(s) IV Push once  dextrose 50% Injectable 25 Gram(s) IV Push once  enoxaparin Injectable 30 milliGRAM(s) SubCutaneous daily  glucagon  Injectable 1 milliGRAM(s) IntraMuscular once  insulin glargine Injectable (LANTUS) 60 Unit(s) SubCutaneous at bedtime  insulin lispro (ADMELOG) corrective regimen sliding scale   SubCutaneous three times a day before meals  insulin lispro (ADMELOG) corrective regimen sliding scale   SubCutaneous at bedtime  mupirocin 2% Ointment 1 Application(s) Topical two times a day  pantoprazole    Tablet 40 milliGRAM(s) Oral before breakfast  piperacillin/tazobactam IVPB.. 3.375 Gram(s) IV Intermittent every 8 hours  tamsulosin 0.4 milliGRAM(s) Oral at bedtime  vancomycin  IVPB 1750 milliGRAM(s) IV Intermittent every 24 hours      Social History  Smoking: no  Etoh: no  Drug use: no      FAMILY HISTORY:  Family history of COPD (chronic obstructive pulmonary disease) (Sibling)    Family history of cardiac disorder  Paternal        Vital Signs Last 24 Hrs  T(C): 36.8 (18 Jan 2022 08:44), Max: 37.7 (17 Jan 2022 21:17)  T(F): 98.2 (18 Jan 2022 08:44), Max: 99.8 (17 Jan 2022 21:17)  HR: 96 (18 Jan 2022 08:44) (84 - 108)  BP: 91/63 (18 Jan 2022 08:44) (90/62 - 107/72)  BP(mean): 84 (18 Jan 2022 05:59) (84 - 84)  RR: 18 (18 Jan 2022 08:44) (17 - 18)  SpO2: 98% (18 Jan 2022 08:44) (94% - 99%)    CBC Full  -  ( 17 Jan 2022 15:04 )  WBC Count : 13.53 K/uL  RBC Count : 5.17 M/uL  Hemoglobin : 12.2 g/dL  Hematocrit : 39.6 %  Platelet Count - Automated : 229 K/uL  Mean Cell Volume : 76.6 fl  Mean Cell Hemoglobin : 23.6 pg  Mean Cell Hemoglobin Concentration : 30.8 gm/dL  Auto Neutrophil # : 10.87 K/uL  Auto Lymphocyte # : 0.95 K/uL  Auto Monocyte # : 1.50 K/uL  Auto Eosinophil # : 0.08 K/uL  Auto Basophil # : 0.03 K/uL  Auto Neutrophil % : 80.4 %  Auto Lymphocyte % : 7.0 %  Auto Monocyte % : 11.1 %  Auto Eosinophil % : 0.6 %  Auto Basophil % : 0.2 %    01-17    130<L>  |  89<L>  |  67<H>  ----------------------------<  254<H>  3.3<L>   |  25  |  2.49<H>    Ca    9.6      17 Jan 2022 15:04  Phos  3.8     01-17  Mg     2.3     01-17    TPro  8.2  /  Alb  4.0  /  TBili  0.7  /  DBili  x   /  AST  15  /  ALT  15  /  AlkPhos  98  01-17    LIVER FUNCTIONS - ( 17 Jan 2022 15:04 )  Alb: 4.0 g/dL / Pro: 8.2 g/dL / ALK PHOS: 98 U/L / ALT: 15 U/L / AST: 15 U/L / GGT: x               MICROBIOLOGY:          RADIOLOGY  < from: [facility] Duplex Lower Ext Vein Scan, Left (01.17.22 @ 20:05) >  No evidence of left lower extremity deep venous thrombosis.    < end of copied text >  < from: Xray Tibia + Fibula 2 Views, Left (01.17.22 @ 15:46) >  Soft tissue swelling about the ankle and proximal foot, without   subcutaneous emphysema or radiographic evidence of necrotizing fasciitis.    < end of copied text >

## 2022-01-18 NOTE — PROGRESS NOTE ADULT - SUBJECTIVE AND OBJECTIVE BOX
Podiatry pager #: [phone] ([facility])/ [phone] ([facility])    Patient is a 61y old  Male who presents with a chief complaint of foot ulcer (17 Jan 2022 23:43)       INTERVAL HPI/OVERNIGHT EVENTS:  Patient seen and evaluated at bedside.  Pt is resting comfortable in NAD. Denies N/V/F/C.     Allergies    No Known Allergies    Intolerances        Vital Signs Last 24 Hrs  T(C): 36.8 (18 Jan 2022 08:44), Max: 37.7 (17 Jan 2022 21:17)  T(F): 98.2 (18 Jan 2022 08:44), Max: 99.8 (17 Jan 2022 21:17)  HR: 96 (18 Jan 2022 08:44) (84 - 108)  BP: 91/63 (18 Jan 2022 08:44) (90/62 - 107/72)  BP(mean): 84 (18 Jan 2022 05:59) (84 - 84)  RR: 18 (18 Jan 2022 08:44) (17 - 18)  SpO2: 98% (18 Jan 2022 08:44) (94% - 99%)    LABS:                        12.2   13.53 )-----------( 229      ( 17 Jan 2022 15:04 )             39.6     01-17    130<L>  |  89<L>  |  67<H>  ----------------------------<  254<H>  3.3<L>   |  25  |  2.49<H>    Ca    9.6      17 Jan 2022 15:04  Phos  3.8     01-17  Mg     2.3     01-17    TPro  8.2  /  Alb  4.0  /  TBili  0.7  /  DBili  x   /  AST  15  /  ALT  15  /  AlkPhos  98  01-17        CAPILLARY BLOOD GLUCOSE      POCT Blood Glucose.: 347 mg/dL (18 Jan 2022 07:37)  POCT Blood Glucose.: 365 mg/dL (18 Jan 2022 01:05)      Lower Extremity Physical Exam:  Vasular: DP/PT 2/4, B/L, CFT <3 seconds B/L, Temperature gradient warm to warm on the L and warm to cool on the R,   Neuro: Epicritic sensation diminished to the level of ankle, B/L.  Musculoskeletal/Ortho: unremarkable  Skin: Left foot submet 3 wound to bone, fibrogranular wound bed, no drainage/ purulence noted, no malodor, no tracking, left foot forefoot erythema extending to mid calf w/ increased edema to LLE    RADIOLOGY & ADDITIONAL TESTS:

## 2022-01-18 NOTE — PROGRESS NOTE ADULT - SUBJECTIVE AND OBJECTIVE BOX
SEVERIANO MARTINEZ  61y Male  MRN:[id number]    Patient is a 61y old  Male who presents with a chief complaint of foot ulcer (18 Jan 2022 12:11)    HPI:  61M with PMH of HTN, CAD/MI s/p stents, HFrEF (EF 15-20%) s/p AICD, T2DM on insulin p/w foot ulcer. Pt had bunionectomy of L foot approx 5 weeks ago with poor wound healing since that time. In the past 2 weeks had started noticing increasing pain, swelling and erythema at site of the wound and was started on levaquin in 1/4/21 x 10 days without any improvement in symptoms. Was started on clindamycin 2-3 days ago without any improvement. Pt states he has 5/10 intermittent sharp pain at site of the wound and initially had only localized swelling and erythema; however, in past 24 hours started noticing the swelling and erythema moving proximally, now with swelling to his knees and erythema to mid calf. Pain has also worsened but remains localized, non radiating but worse with weight bearing or ambulation; has been taking Tylenol or Advil for pain with some relief. Endorses minimal clear discharge from wound. Went to see podiatrist today who referred him to ED for further evaluation. Denies any fevers, +chills ongoing x weeks, +1 episode of nausea with vomiting this morning, no abd pain, no CP, SOB, no GI or  symptoms.  (17 Jan 2022 23:43)      Patient seen and evaluated at bedside. No acute events overnight except as noted.    Interval HPI: no acute events o/n     PAST MEDICAL & SURGICAL HISTORY:  Stented coronary artery    Diabetes    AICD (automatic cardioverter/defibrillator) present    Hypertension    Heart failure with reduced ejection fraction    History of ischemic cardiomyopathy    History of COPD    H/O gastroesophageal reflux (GERD)    H/O vasectomy  20 yrs ago (2000)        REVIEW OF SYSTEMS:  as per hpi     VITALS:  Vital Signs Last 24 Hrs  T(C): 36.8 (18 Jan 2022 08:44), Max: 37.7 (17 Jan 2022 21:17)  T(F): 98.2 (18 Jan 2022 08:44), Max: 99.8 (17 Jan 2022 21:17)  HR: 96 (18 Jan 2022 08:44) (84 - 108)  BP: 91/63 (18 Jan 2022 08:44) (90/62 - 107/72)  BP(mean): 84 (18 Jan 2022 05:59) (84 - 84)  RR: 18 (18 Jan 2022 08:44) (17 - 18)  SpO2: 98% (18 Jan 2022 08:44) (94% - 99%)  CAPILLARY BLOOD GLUCOSE      POCT Blood Glucose.: 347 mg/dL (18 Jan 2022 07:37)  POCT Blood Glucose.: 365 mg/dL (18 Jan 2022 01:05)    I&O's Summary    18 Jan 2022 07:01  -  18 Jan 2022 12:31  --------------------------------------------------------  IN: 300 mL / OUT: 0 mL / NET: 300 mL        PHYSICAL EXAM:  GENERAL: NAD, well-developed  HEAD:  Atraumatic, Normocephalic  EYES: EOMI, PERRLA, conjunctiva and sclera clear  NECK: Supple, No JVD  CHEST/LUNG: Clear to auscultation bilaterally; No wheeze  HEART: S1, S2; No murmurs, rubs, or gallops  ABDOMEN: Soft, Nontender, Nondistended; Bowel sounds present  EXTREMITIES:  2+ Peripheral Pulses, No clubbing, cyanosis, or edema. LLE cellulitis   PSYCH: Normal affect  NEUROLOGY: AAOX3; non-focal  SKIN: No rashes or lesions    Consultant(s) Notes Reviewed:  [x ] YES  [ ] NO  Care Discussed with Consultants/Other Providers [ x] YES  [ ] NO    MEDS:  MEDICATIONS  (STANDING):  aspirin  chewable 81 milliGRAM(s) Oral daily  atorvastatin 80 milliGRAM(s) Oral at bedtime  clopidogrel Tablet 75 milliGRAM(s) Oral daily  dextrose 40% Gel 15 Gram(s) Oral once  dextrose 5%. 1000 milliLiter(s) (50 mL/Hr) IV Continuous <Continuous>  dextrose 5%. 1000 milliLiter(s) (100 mL/Hr) IV Continuous <Continuous>  dextrose 50% Injectable 25 Gram(s) IV Push once  dextrose 50% Injectable 12.5 Gram(s) IV Push once  dextrose 50% Injectable 25 Gram(s) IV Push once  enoxaparin Injectable 30 milliGRAM(s) SubCutaneous daily  glucagon  Injectable 1 milliGRAM(s) IntraMuscular once  insulin glargine Injectable (LANTUS) 60 Unit(s) SubCutaneous at bedtime  insulin lispro (ADMELOG) corrective regimen sliding scale   SubCutaneous three times a day before meals  insulin lispro (ADMELOG) corrective regimen sliding scale   SubCutaneous at bedtime  mupirocin 2% Ointment 1 Application(s) Topical two times a day  pantoprazole    Tablet 40 milliGRAM(s) Oral before breakfast  piperacillin/tazobactam IVPB.. 3.375 Gram(s) IV Intermittent every 8 hours  tamsulosin 0.4 milliGRAM(s) Oral at bedtime  vancomycin  IVPB 1750 milliGRAM(s) IV Intermittent every 24 hours    MEDICATIONS  (PRN):  acetaminophen     Tablet .. 650 milliGRAM(s) Oral every 6 hours PRN Temp greater or equal to 38C (100.4F), Mild Pain (1 - 3)  aluminum hydroxide/magnesium hydroxide/simethicone Suspension 30 milliLiter(s) Oral every 4 hours PRN Dyspepsia  melatonin 3 milliGRAM(s) Oral at bedtime PRN Insomnia  ondansetron Injectable 4 milliGRAM(s) IV Push every 8 hours PRN Nausea and/or Vomiting    ALLERGIES:  No Known Allergies      LABS:                        12.2   13.53 )-----------( 229      ( 17 Jan 2022 15:04 )             39.6     01-17    130<L>  |  89<L>  |  67<H>  ----------------------------<  254<H>  3.3<L>   |  25  |  2.49<H>    Ca    9.6      17 Jan 2022 15:04  Phos  3.8     01-17  Mg     2.3     01-17    TPro  8.2  /  Alb  4.0  /  TBili  0.7  /  DBili  x   /  AST  15  /  ALT  15  /  AlkPhos  98  01-17          LIVER FUNCTIONS - ( 17 Jan 2022 15:04 )  Alb: 4.0 g/dL / Pro: 8.2 g/dL / ALK PHOS: 98 U/L / ALT: 15 U/L / AST: 15 U/L / GGT: x            < from: [facility] Duplex Lower Ext Vein Scan, Left (01.17.22 @ 20:05) >    IMPRESSION:  No evidence of left lower extremity deep venous thrombosis.      < end of copied text >  < from: Xray Tibia + Fibula 2 Views, Left (01.17.22 @ 15:46) >  IMPRESSION:  Soft tissue swelling about the ankle and proximal foot, without   subcutaneous emphysema or radiographic evidence of necrotizing fasciitis.    No acute fracture or dislocation.    < end of copied text >

## 2022-01-18 NOTE — ED ADULT NURSE REASSESSMENT NOTE - NS ED NURSE REASSESS COMMENT FT1
Pt received from Suresh RN, a&ox4. No complaints or distress noted at this time. Pt admitted to medicine. Awaiting MRI.

## 2022-01-19 LAB
-  AMPICILLIN: SIGNIFICANT CHANGE UP
-  TETRACYCLINE: SIGNIFICANT CHANGE UP
-  VANCOMYCIN: SIGNIFICANT CHANGE UP
ANION GAP SERPL CALC-SCNC: 17 MMOL/L — SIGNIFICANT CHANGE UP (ref 5–17)
ANION GAP SERPL CALC-SCNC: 21 MMOL/L — HIGH (ref 5–17)
APPEARANCE UR: CLEAR — SIGNIFICANT CHANGE UP
BILIRUB UR-MCNC: NEGATIVE — SIGNIFICANT CHANGE UP
BUN SERPL-MCNC: 80 MG/DL — HIGH (ref 7–23)
BUN SERPL-MCNC: 86 MG/DL — HIGH (ref 7–23)
CALCIUM SERPL-MCNC: 9.1 MG/DL — SIGNIFICANT CHANGE UP (ref 8.4–10.5)
CALCIUM SERPL-MCNC: 9.5 MG/DL — SIGNIFICANT CHANGE UP (ref 8.4–10.5)
CHLORIDE SERPL-SCNC: 91 MMOL/L — LOW (ref 96–108)
CHLORIDE SERPL-SCNC: 91 MMOL/L — LOW (ref 96–108)
CO2 SERPL-SCNC: 20 MMOL/L — LOW (ref 22–31)
CO2 SERPL-SCNC: 22 MMOL/L — SIGNIFICANT CHANGE UP (ref 22–31)
COLOR SPEC: SIGNIFICANT CHANGE UP
CREAT ?TM UR-MCNC: 137 MG/DL — SIGNIFICANT CHANGE UP
CREAT SERPL-MCNC: 2.57 MG/DL — HIGH (ref 0.5–1.3)
CREAT SERPL-MCNC: 2.92 MG/DL — HIGH (ref 0.5–1.3)
CULTURE RESULTS: SIGNIFICANT CHANGE UP
DIFF PNL FLD: NEGATIVE — SIGNIFICANT CHANGE UP
GLUCOSE BLDC GLUCOMTR-MCNC: 213 MG/DL — HIGH (ref 70–99)
GLUCOSE BLDC GLUCOMTR-MCNC: 272 MG/DL — HIGH (ref 70–99)
GLUCOSE BLDC GLUCOMTR-MCNC: 283 MG/DL — HIGH (ref 70–99)
GLUCOSE BLDC GLUCOMTR-MCNC: 303 MG/DL — HIGH (ref 70–99)
GLUCOSE SERPL-MCNC: 187 MG/DL — HIGH (ref 70–99)
GLUCOSE SERPL-MCNC: 273 MG/DL — HIGH (ref 70–99)
GLUCOSE UR QL: NEGATIVE — SIGNIFICANT CHANGE UP
HCT VFR BLD CALC: 33.7 % — LOW (ref 39–50)
HGB BLD-MCNC: 10.7 G/DL — LOW (ref 13–17)
KETONES UR-MCNC: NEGATIVE — SIGNIFICANT CHANGE UP
LEUKOCYTE ESTERASE UR-ACNC: NEGATIVE — SIGNIFICANT CHANGE UP
MCHC RBC-ENTMCNC: 23.8 PG — LOW (ref 27–34)
MCHC RBC-ENTMCNC: 31.8 GM/DL — LOW (ref 32–36)
MCV RBC AUTO: 75.1 FL — LOW (ref 80–100)
METHOD TYPE: SIGNIFICANT CHANGE UP
NITRITE UR-MCNC: NEGATIVE — SIGNIFICANT CHANGE UP
NRBC # BLD: 0 /100 WBCS — SIGNIFICANT CHANGE UP (ref 0–0)
ORGANISM # SPEC MICROSCOPIC CNT: SIGNIFICANT CHANGE UP
ORGANISM # SPEC MICROSCOPIC CNT: SIGNIFICANT CHANGE UP
PH UR: 5.5 — SIGNIFICANT CHANGE UP (ref 5–8)
PLATELET # BLD AUTO: 210 K/UL — SIGNIFICANT CHANGE UP (ref 150–400)
POTASSIUM SERPL-MCNC: 3 MMOL/L — LOW (ref 3.5–5.3)
POTASSIUM SERPL-MCNC: 3.5 MMOL/L — SIGNIFICANT CHANGE UP (ref 3.5–5.3)
POTASSIUM SERPL-SCNC: 3 MMOL/L — LOW (ref 3.5–5.3)
POTASSIUM SERPL-SCNC: 3.5 MMOL/L — SIGNIFICANT CHANGE UP (ref 3.5–5.3)
PROT ?TM UR-MCNC: 26 MG/DL — HIGH (ref 0–12)
PROT UR-MCNC: ABNORMAL
PROT/CREAT UR-RTO: 0.2 RATIO — SIGNIFICANT CHANGE UP (ref 0–0.2)
RBC # BLD: 4.49 M/UL — SIGNIFICANT CHANGE UP (ref 4.2–5.8)
RBC # FLD: 15.1 % — HIGH (ref 10.3–14.5)
SODIUM SERPL-SCNC: 130 MMOL/L — LOW (ref 135–145)
SODIUM SERPL-SCNC: 132 MMOL/L — LOW (ref 135–145)
SP GR SPEC: 1.01 — SIGNIFICANT CHANGE UP (ref 1.01–1.02)
SPECIMEN SOURCE: SIGNIFICANT CHANGE UP
UROBILINOGEN FLD QL: NEGATIVE — SIGNIFICANT CHANGE UP
WBC # BLD: 9.7 K/UL — SIGNIFICANT CHANGE UP (ref 3.8–10.5)
WBC # FLD AUTO: 9.7 K/UL — SIGNIFICANT CHANGE UP (ref 3.8–10.5)

## 2022-01-19 PROCEDURE — 71046 X-RAY EXAM CHEST 2 VIEWS: CPT | Mod: 26

## 2022-01-19 PROCEDURE — 73720 MRI LWR EXTREMITY W/O&W/DYE: CPT | Mod: 26,LT

## 2022-01-19 PROCEDURE — 99232 SBSQ HOSP IP/OBS MODERATE 35: CPT

## 2022-01-19 RX ORDER — INSULIN LISPRO 100/ML
8 VIAL (ML) SUBCUTANEOUS
Refills: 0 | Status: DISCONTINUED | OUTPATIENT
Start: 2022-01-19 | End: 2022-01-20

## 2022-01-19 RX ORDER — INFLUENZA VIRUS VACCINE 15; 15; 15; 15 UG/.5ML; UG/.5ML; UG/.5ML; UG/.5ML
0.5 SUSPENSION INTRAMUSCULAR ONCE
Refills: 0 | Status: DISCONTINUED | OUTPATIENT
Start: 2022-01-19 | End: 2022-01-25

## 2022-01-19 RX ORDER — ALPRAZOLAM 0.25 MG
0.25 TABLET ORAL
Refills: 0 | Status: DISCONTINUED | OUTPATIENT
Start: 2022-01-19 | End: 2022-01-21

## 2022-01-19 RX ORDER — AMPICILLIN SODIUM AND SULBACTAM SODIUM 250; 125 MG/ML; MG/ML
3 INJECTION, POWDER, FOR SUSPENSION INTRAMUSCULAR; INTRAVENOUS EVERY 24 HOURS
Refills: 0 | Status: DISCONTINUED | OUTPATIENT
Start: 2022-01-19 | End: 2022-01-20

## 2022-01-19 RX ADMIN — MUPIROCIN 1 APPLICATION(S): 20 OINTMENT TOPICAL at 20:04

## 2022-01-19 RX ADMIN — AMPICILLIN SODIUM AND SULBACTAM SODIUM 200 GRAM(S): 250; 125 INJECTION, POWDER, FOR SUSPENSION INTRAMUSCULAR; INTRAVENOUS at 17:29

## 2022-01-19 RX ADMIN — Medication 8 UNIT(S): at 17:49

## 2022-01-19 RX ADMIN — TAMSULOSIN HYDROCHLORIDE 0.4 MILLIGRAM(S): 0.4 CAPSULE ORAL at 21:58

## 2022-01-19 RX ADMIN — ENOXAPARIN SODIUM 30 MILLIGRAM(S): 100 INJECTION SUBCUTANEOUS at 12:36

## 2022-01-19 RX ADMIN — Medication 650 MILLIGRAM(S): at 22:39

## 2022-01-19 RX ADMIN — INSULIN GLARGINE 60 UNIT(S): 100 INJECTION, SOLUTION SUBCUTANEOUS at 21:59

## 2022-01-19 RX ADMIN — MUPIROCIN 1 APPLICATION(S): 20 OINTMENT TOPICAL at 05:33

## 2022-01-19 RX ADMIN — CLOPIDOGREL BISULFATE 75 MILLIGRAM(S): 75 TABLET, FILM COATED ORAL at 12:36

## 2022-01-19 RX ADMIN — Medication 2: at 09:21

## 2022-01-19 RX ADMIN — PANTOPRAZOLE SODIUM 40 MILLIGRAM(S): 20 TABLET, DELAYED RELEASE ORAL at 05:33

## 2022-01-19 RX ADMIN — Medication 81 MILLIGRAM(S): at 12:36

## 2022-01-19 RX ADMIN — Medication 1: at 21:59

## 2022-01-19 RX ADMIN — ATORVASTATIN CALCIUM 80 MILLIGRAM(S): 80 TABLET, FILM COATED ORAL at 22:10

## 2022-01-19 RX ADMIN — Medication 4: at 12:36

## 2022-01-19 RX ADMIN — Medication 650 MILLIGRAM(S): at 09:21

## 2022-01-19 RX ADMIN — Medication 3: at 17:49

## 2022-01-19 RX ADMIN — Medication 650 MILLIGRAM(S): at 22:09

## 2022-01-19 RX ADMIN — Medication 650 MILLIGRAM(S): at 10:21

## 2022-01-19 NOTE — PROGRESS NOTE ADULT - SUBJECTIVE AND OBJECTIVE BOX
SEVERIANO MARTINEZ  61y Male  MRN:[id number]    Patient is a 61y old  Male who presents with a chief complaint of foot ulcer (18 Jan 2022 12:11)    HPI:  61M with PMH of HTN, CAD/MI s/p stents, HFrEF (EF 15-20%) s/p AICD, T2DM on insulin p/w foot ulcer. Pt had bunionectomy of L foot approx 5 weeks ago with poor wound healing since that time. In the past 2 weeks had started noticing increasing pain, swelling and erythema at site of the wound and was started on levaquin in 1/4/21 x 10 days without any improvement in symptoms. Was started on clindamycin 2-3 days ago without any improvement. Pt states he has 5/10 intermittent sharp pain at site of the wound and initially had only localized swelling and erythema; however, in past 24 hours started noticing the swelling and erythema moving proximally, now with swelling to his knees and erythema to mid calf. Pain has also worsened but remains localized, non radiating but worse with weight bearing or ambulation; has been taking Tylenol or Advil for pain with some relief. Endorses minimal clear discharge from wound. Went to see podiatrist today who referred him to ED for further evaluation. Denies any fevers, +chills ongoing x weeks, +1 episode of nausea with vomiting this morning, no abd pain, no CP, SOB, no GI or  symptoms.  (17 Jan 2022 23:43)      Patient seen and evaluated at bedside. No acute events overnight except as noted.    Interval HPI: no acute events o/n     PAST MEDICAL & SURGICAL HISTORY:  Stented coronary artery    Diabetes    AICD (automatic cardioverter/defibrillator) present    Hypertension    Heart failure with reduced ejection fraction    History of ischemic cardiomyopathy    History of COPD    H/O gastroesophageal reflux (GERD)    H/O vasectomy  20 yrs ago (2000)        REVIEW OF SYSTEMS:  as per hpi     VITALS:   Vital Signs Last 24 Hrs  T(C): 36.6 (19 Jan 2022 11:15), Max: 36.9 (18 Jan 2022 12:42)  T(F): 97.9 (19 Jan 2022 11:15), Max: 98.5 (18 Jan 2022 12:42)  HR: 96 (19 Jan 2022 11:15) (96 - 99)  BP: 96/64 (19 Jan 2022 11:15) (96/64 - 103/65)  BP(mean): --  RR: 18 (19 Jan 2022 11:15) (18 - 18)  SpO2: 96% (19 Jan 2022 11:15) (95% - 97%)      PHYSICAL EXAM:  GENERAL: NAD, well-developed  HEAD:  Atraumatic, Normocephalic  EYES: EOMI, PERRLA, conjunctiva and sclera clear  NECK: Supple, No JVD  CHEST/LUNG: Clear to auscultation bilaterally; No wheeze  HEART: S1, S2; No murmurs, rubs, or gallops  ABDOMEN: Soft, Nontender, Nondistended; Bowel sounds present  EXTREMITIES:  2+ Peripheral Pulses, No clubbing, cyanosis, or edema. LLE cellulitis   PSYCH: Normal affect  NEUROLOGY: AAOX3; non-focal  SKIN: No rashes or lesions    Consultant(s) Notes Reviewed:  [x ] YES  [ ] NO  Care Discussed with Consultants/Other Providers [ x] YES  [ ] NO    MEDS:   MEDICATIONS  (STANDING):  ampicillin/sulbactam  IVPB 3 Gram(s) IV Intermittent every 24 hours  aspirin  chewable 81 milliGRAM(s) Oral daily  atorvastatin 80 milliGRAM(s) Oral at bedtime  clopidogrel Tablet 75 milliGRAM(s) Oral daily  dextrose 40% Gel 15 Gram(s) Oral once  dextrose 5%. 1000 milliLiter(s) (50 mL/Hr) IV Continuous <Continuous>  dextrose 5%. 1000 milliLiter(s) (100 mL/Hr) IV Continuous <Continuous>  dextrose 50% Injectable 25 Gram(s) IV Push once  dextrose 50% Injectable 12.5 Gram(s) IV Push once  dextrose 50% Injectable 25 Gram(s) IV Push once  enoxaparin Injectable 30 milliGRAM(s) SubCutaneous daily  glucagon  Injectable 1 milliGRAM(s) IntraMuscular once  insulin glargine Injectable (LANTUS) 60 Unit(s) SubCutaneous at bedtime  insulin lispro (ADMELOG) corrective regimen sliding scale   SubCutaneous three times a day before meals  insulin lispro (ADMELOG) corrective regimen sliding scale   SubCutaneous at bedtime  insulin lispro Injectable (ADMELOG) 8 Unit(s) SubCutaneous three times a day before meals  mupirocin 2% Ointment 1 Application(s) Topical two times a day  pantoprazole    Tablet 40 milliGRAM(s) Oral before breakfast  tamsulosin 0.4 milliGRAM(s) Oral at bedtime    MEDICATIONS  (PRN):  acetaminophen     Tablet .. 650 milliGRAM(s) Oral every 6 hours PRN Temp greater or equal to 38C (100.4F), Mild Pain (1 - 3)  ALPRAZolam 0.25 milliGRAM(s) Oral two times a day PRN anxiety  aluminum hydroxide/magnesium hydroxide/simethicone Suspension 30 milliLiter(s) Oral every 4 hours PRN Dyspepsia  melatonin 3 milliGRAM(s) Oral at bedtime PRN Insomnia  ondansetron Injectable 4 milliGRAM(s) IV Push every 8 hours PRN Nausea and/or Vomiting      ALLERGIES:  No Known Allergies      LABS:                                               10.7   9.70  )-----------( 210      ( 19 Jan 2022 07:16 )             33.7   01-19    132<L>  |  91<L>  |  86<H>  ----------------------------<  187<H>  3.0<L>   |  20<L>  |  2.92<H>    Ca    9.5      19 Jan 2022 07:16  Phos  3.8     01-17  Mg     2.3     01-17    TPro  8.2  /  Alb  4.0  /  TBili  0.7  /  DBili  x   /  AST  15  /  ALT  15  /  AlkPhos  98  01-17     < from: [facility] Duplex Lower Ext Vein Scan, Left (01.17.22 @ 20:05) >    IMPRESSION:  No evidence of left lower extremity deep venous thrombosis.      < end of copied text >  < from: Xray Tibia + Fibula 2 Views, Left (01.17.22 @ 15:46) >  IMPRESSION:  Soft tissue swelling about the ankle and proximal foot, without   subcutaneous emphysema or radiographic evidence of necrotizing fasciitis.    No acute fracture or dislocation.    < end of copied text >

## 2022-01-19 NOTE — PROGRESS NOTE ADULT - SUBJECTIVE AND OBJECTIVE BOX
[facility]   [phone]  RENAL FOLLOW UP NOTE  --------------------------------------------------------------------------------  Chief Complaint:    24 hour events/subjective:    seen earlier states has no chills     PAST HISTORY  --------------------------------------------------------------------------------  No significant changes to PMH, PSH, FHx, SHx, unless otherwise noted    ALLERGIES & MEDICATIONS  --------------------------------------------------------------------------------  Allergies    No Known Allergies    Intolerances      Standing Inpatient Medications  ampicillin/sulbactam  IVPB 3 Gram(s) IV Intermittent every 24 hours  aspirin  chewable 81 milliGRAM(s) Oral daily  atorvastatin 80 milliGRAM(s) Oral at bedtime  clopidogrel Tablet 75 milliGRAM(s) Oral daily  dextrose 40% Gel 15 Gram(s) Oral once  dextrose 5%. 1000 milliLiter(s) IV Continuous <Continuous>  dextrose 5%. 1000 milliLiter(s) IV Continuous <Continuous>  dextrose 50% Injectable 12.5 Gram(s) IV Push once  dextrose 50% Injectable 25 Gram(s) IV Push once  dextrose 50% Injectable 25 Gram(s) IV Push once  enoxaparin Injectable 30 milliGRAM(s) SubCutaneous daily  glucagon  Injectable 1 milliGRAM(s) IntraMuscular once  influenza   Vaccine 0.5 milliLiter(s) IntraMuscular once  insulin glargine Injectable (LANTUS) 60 Unit(s) SubCutaneous at bedtime  insulin lispro (ADMELOG) corrective regimen sliding scale   SubCutaneous three times a day before meals  insulin lispro (ADMELOG) corrective regimen sliding scale   SubCutaneous at bedtime  insulin lispro Injectable (ADMELOG) 8 Unit(s) SubCutaneous three times a day before meals  mupirocin 2% Ointment 1 Application(s) Topical two times a day  pantoprazole    Tablet 40 milliGRAM(s) Oral before breakfast  tamsulosin 0.4 milliGRAM(s) Oral at bedtime    PRN Inpatient Medications  acetaminophen     Tablet .. 650 milliGRAM(s) Oral every 6 hours PRN  ALPRAZolam 0.25 milliGRAM(s) Oral two times a day PRN  aluminum hydroxide/magnesium hydroxide/simethicone Suspension 30 milliLiter(s) Oral every 4 hours PRN  melatonin 3 milliGRAM(s) Oral at bedtime PRN  ondansetron Injectable 4 milliGRAM(s) IV Push every 8 hours PRN      REVIEW OF SYSTEMS  --------------------------------------------------------------------------------    Gen: denies  fevers/chills,  CVS: denies chest pain/palpitations  Resp: denies SOB/Cough  GI: Denies N/V/Abd pain  : Denies dysuria        VITALS/PHYSICAL EXAM  --------------------------------------------------------------------------------  T(C): 37.1 (01-19-22 @ 16:29), Max: 37.1 (01-19-22 @ 16:29)  HR: 92 (01-19-22 @ 16:29) (92 - 112)  BP: 104/85 (01-19-22 @ 16:29) (96/64 - 105/69)  RR: 18 (01-19-22 @ 16:29) (18 - 18)  SpO2: 97% (01-19-22 @ 16:29) (95% - 97%)  Wt(kg): --  Height (cm): 195.6 (01-19-22 @ 16:29)  Weight (kg): 127.9 (01-19-22 @ 16:29)  BMI (kg/m2): 33.4 (01-19-22 @ 16:29)  BSA (m2): 2.59 (01-19-22 @ 16:29)      01-18-22 @ 07:01  -  01-19-22 @ 07:00  --------------------------------------------------------  IN: 300 mL / OUT: 0 mL / NET: 300 mL    01-19-22 @ 07:01  -  01-19-22 @ 21:13  --------------------------------------------------------  IN: 100 mL / OUT: 0 mL / NET: 100 mL      Physical Exam:  	  Gen: Non toxic comfortable appearing   	no jvd  	Pulm: decrease bs  no rales or ronchi or wheezing  	CV: RRR, S1S2; no rub  	Back: No CVA tenderness  	Abd: +BS, soft, nontender/nondistended  	: No suprapubic tenderness  	UE: Warm, no cyanosis  no clubbing,  no edema; no asterixis  	LE: Warm, left leg edema 2- ; rle no edema left foot dressing   	Neuro: alert and oriented. speech coherent   	      LABS/STUDIES  --------------------------------------------------------------------------------              10.7   9.70  >-----------<  210      [01-19-22 @ 07:16]              33.7     130  |  91  |  80  ----------------------------<  273      [01-19-22 @ 17:20]  3.5   |  22  |  2.57        Ca     9.1     [01-19-22 @ 17:20]            Creatinine Trend:  SCr 2.57 [01-19 @ 17:20]  SCr 2.92 [01-19 @ 07:16]  SCr 2.49 [01-17 @ 15:04]              Urinalysis - [01-19-22 @ 06:25]      Color Light Yellow / Appearance Clear / SG 1.014 / pH 5.5      Gluc Negative / Ketone Negative  / Bili Negative / Urobili Negative       Blood Negative / Protein 30 mg/dL / Leuk Est Negative / Nitrite Negative      RBC 2 / WBC 1 / Hyaline 4 / Gran  / Sq Epi  / Non Sq Epi 0 / Bacteria Negative    Urine Creatinine 137      [01-19-22 @ 06:25]  Urine Protein 26      [01-19-22 @ 06:25]    HbA1c 8.9      [12-16-19 @ 08:15]

## 2022-01-19 NOTE — PROGRESS NOTE ADULT - ASSESSMENT
61M with PMH of HTN, CAD/MI s/p stents, HFrEF (EF 15-20%) s/p AICD, T2DM on insulin p/w foot ulcer with foot cellulitis, diabetic foot infection, leukocytosis, sepsis     Sam Orozco  Attending Physician   Division of Infectious Disease  Pager #[phone]  Available on [employer] Teams also  After 5pm/weekend or no response, call #[phone]

## 2022-01-19 NOTE — PATIENT PROFILE ADULT - FALL HARM RISK - UNIVERSAL INTERVENTIONS
Bed in lowest position, wheels locked, appropriate side rails in place/Call bell, personal items and telephone in reach/Instruct patient to call for assistance before getting out of bed or chair/Non-slip footwear when patient is out of bed/Gray to call system/Physically safe environment - no spills, clutter or unnecessary equipment/Purposeful Proactive Rounding/Room/bathroom lighting operational, light cord in reach

## 2022-01-19 NOTE — PROGRESS NOTE ADULT - ASSESSMENT
61M with PMH of HTN, CAD/MI s/p stents, HFrEF (EF 15-20%) s/p AICD, T2DM on insulin p/w foot ulcer. he has MANJIT on ckd III with baseline cr known to be 1.5 in 2020. he also has uncontrolled DM       1- MANJIT on ckd   2- cellulitis foot   3- DM   4- HTN   5- CHF/HFrEF  6- cardiomyopathy       manjit in setting of infection cont hold entresto and lasix temporarily  trend cr  it seems to be stabilizing.   strict I/O  iv vanco1 g qd  cefepime 1 g qd

## 2022-01-19 NOTE — PROGRESS NOTE ADULT - SUBJECTIVE AND OBJECTIVE BOX
SEVERIANO MARTINEZ 61y [id number]    Patient is a 61y old  Male who presents with a chief complaint of foot ulcer (2022 22:38)      Follow Up/CC:  ID following for cellulitis    Interval History/ROS: no fever, feels ok, leg swelling+    Allergies    No Known Allergies    Intolerances        ANTIMICROBIALS:  cefepime   IVPB 1000 daily  vancomycin  IVPB 1000 every 24 hours      MEDICATIONS  (STANDING):  aspirin  chewable 81 milliGRAM(s) Oral daily  atorvastatin 80 milliGRAM(s) Oral at bedtime  cefepime   IVPB 1000 milliGRAM(s) IV Intermittent daily  clopidogrel Tablet 75 milliGRAM(s) Oral daily  dextrose 40% Gel 15 Gram(s) Oral once  dextrose 5%. 1000 milliLiter(s) (50 mL/Hr) IV Continuous <Continuous>  dextrose 5%. 1000 milliLiter(s) (100 mL/Hr) IV Continuous <Continuous>  dextrose 50% Injectable 25 Gram(s) IV Push once  dextrose 50% Injectable 12.5 Gram(s) IV Push once  dextrose 50% Injectable 25 Gram(s) IV Push once  enoxaparin Injectable 30 milliGRAM(s) SubCutaneous daily  glucagon  Injectable 1 milliGRAM(s) IntraMuscular once  insulin glargine Injectable (LANTUS) 60 Unit(s) SubCutaneous at bedtime  insulin lispro (ADMELOG) corrective regimen sliding scale   SubCutaneous three times a day before meals  insulin lispro (ADMELOG) corrective regimen sliding scale   SubCutaneous at bedtime  mupirocin 2% Ointment 1 Application(s) Topical two times a day  pantoprazole    Tablet 40 milliGRAM(s) Oral before breakfast  tamsulosin 0.4 milliGRAM(s) Oral at bedtime  vancomycin  IVPB 1000 milliGRAM(s) IV Intermittent every 24 hours    MEDICATIONS  (PRN):  acetaminophen     Tablet .. 650 milliGRAM(s) Oral every 6 hours PRN Temp greater or equal to 38C (100.4F), Mild Pain (1 - 3)  aluminum hydroxide/magnesium hydroxide/simethicone Suspension 30 milliLiter(s) Oral every 4 hours PRN Dyspepsia  melatonin 3 milliGRAM(s) Oral at bedtime PRN Insomnia  ondansetron Injectable 4 milliGRAM(s) IV Push every 8 hours PRN Nausea and/or Vomiting        Vital Signs Last 24 Hrs  T(C): 36.7 (2022 05:00), Max: 36.9 (2022 12:42)  T(F): 98 (2022 05:00), Max: 98.5 (2022 12:42)  HR: 99 (2022 05:00) (97 - 99)  BP: 103/65 (2022 05:00) (99/64 - 103/65)  BP(mean): --  RR: 18 (2022 05:00) (18 - 18)  SpO2: 95% (2022 05:00) (95% - 97%)    CBC Full  -  ( 2022 07:16 )  WBC Count : 9.70 K/uL  RBC Count : 4.49 M/uL  Hemoglobin : 10.7 g/dL  Hematocrit : 33.7 %  Platelet Count - Automated : 210 K/uL  Mean Cell Volume : 75.1 fl  Mean Cell Hemoglobin : 23.8 pg  Mean Cell Hemoglobin Concentration : 31.8 gm/dL  Auto Neutrophil # : x  Auto Lymphocyte # : x  Auto Monocyte # : x  Auto Eosinophil # : x  Auto Basophil # : x  Auto Neutrophil % : x  Auto Lymphocyte % : x  Auto Monocyte % : x  Auto Eosinophil % : x  Auto Basophil % : x    -19    132<L>  |  91<L>  |  86<H>  ----------------------------<  187<H>  3.0<L>   |  20<L>  |  2.92<H>    Ca    9.5      2022 07:16  Phos  3.8     -  Mg     2.3     -    TPro  8.2  /  Alb  4.0  /  TBili  0.7  /  DBili  x   /  AST  15  /  ALT  15  /  AlkPhos  98  01-17    LIVER FUNCTIONS - ( 2022 15:04 )  Alb: 4.0 g/dL / Pro: 8.2 g/dL / ALK PHOS: 98 U/L / ALT: 15 U/L / AST: 15 U/L / GGT: x           Urinalysis Basic - ( 2022 06:25 )    Color: Light Yellow / Appearance: Clear / S.014 / pH: x  Gluc: x / Ketone: Negative  / Bili: Negative / Urobili: Negative   Blood: x / Protein: 30 mg/dL / Nitrite: Negative   Leuk Esterase: Negative / RBC: 2 /hpf / WBC 1 /HPF   Sq Epi: x / Non Sq Epi: 0 /hpf / Bacteria: Negative        MICROBIOLOGY:  .Abscess left foot wound culture  22   Numerous Alpha hemolytic strep "Susceptibilities not performed"  Rare Enterococcus faecalis  --  --      .Blood Blood-Peripheral  22   No growth to date.  --  --      RADIOLOGY    < from: [facility] Duplex Lower Ext Vein Scan, Left (22 @ 20:05) >  No evidence of left lower extremity deep venous thrombosis.    < end of copied text >

## 2022-01-20 ENCOUNTER — TRANSCRIPTION ENCOUNTER (OUTPATIENT)
Age: 62
End: 2022-01-20

## 2022-01-20 LAB
ANION GAP SERPL CALC-SCNC: 17 MMOL/L — SIGNIFICANT CHANGE UP (ref 5–17)
BASOPHILS # BLD AUTO: 0.03 K/UL — SIGNIFICANT CHANGE UP (ref 0–0.2)
BASOPHILS NFR BLD AUTO: 0.3 % — SIGNIFICANT CHANGE UP (ref 0–2)
BUN SERPL-MCNC: 72 MG/DL — HIGH (ref 7–23)
CALCIUM SERPL-MCNC: 9.3 MG/DL — SIGNIFICANT CHANGE UP (ref 8.4–10.5)
CHLORIDE SERPL-SCNC: 94 MMOL/L — LOW (ref 96–108)
CO2 SERPL-SCNC: 22 MMOL/L — SIGNIFICANT CHANGE UP (ref 22–31)
CREAT SERPL-MCNC: 2.07 MG/DL — HIGH (ref 0.5–1.3)
EOSINOPHIL # BLD AUTO: 0.18 K/UL — SIGNIFICANT CHANGE UP (ref 0–0.5)
EOSINOPHIL NFR BLD AUTO: 1.7 % — SIGNIFICANT CHANGE UP (ref 0–6)
GLUCOSE BLDC GLUCOMTR-MCNC: 152 MG/DL — HIGH (ref 70–99)
GLUCOSE BLDC GLUCOMTR-MCNC: 166 MG/DL — HIGH (ref 70–99)
GLUCOSE BLDC GLUCOMTR-MCNC: 167 MG/DL — HIGH (ref 70–99)
GLUCOSE BLDC GLUCOMTR-MCNC: 179 MG/DL — HIGH (ref 70–99)
GLUCOSE BLDC GLUCOMTR-MCNC: 223 MG/DL — HIGH (ref 70–99)
GLUCOSE SERPL-MCNC: 203 MG/DL — HIGH (ref 70–99)
HCT VFR BLD CALC: 34.2 % — LOW (ref 39–50)
HGB BLD-MCNC: 11 G/DL — LOW (ref 13–17)
IMM GRANULOCYTES NFR BLD AUTO: 1.1 % — SIGNIFICANT CHANGE UP (ref 0–1.5)
INR BLD: 1.31 RATIO — HIGH (ref 0.88–1.16)
LYMPHOCYTES # BLD AUTO: 0.84 K/UL — LOW (ref 1–3.3)
LYMPHOCYTES # BLD AUTO: 8 % — LOW (ref 13–44)
MCHC RBC-ENTMCNC: 24 PG — LOW (ref 27–34)
MCHC RBC-ENTMCNC: 32.2 GM/DL — SIGNIFICANT CHANGE UP (ref 32–36)
MCV RBC AUTO: 74.7 FL — LOW (ref 80–100)
MONOCYTES # BLD AUTO: 1.03 K/UL — HIGH (ref 0–0.9)
MONOCYTES NFR BLD AUTO: 9.8 % — SIGNIFICANT CHANGE UP (ref 2–14)
NEUTROPHILS # BLD AUTO: 8.35 K/UL — HIGH (ref 1.8–7.4)
NEUTROPHILS NFR BLD AUTO: 79.1 % — HIGH (ref 43–77)
NRBC # BLD: 0 /100 WBCS — SIGNIFICANT CHANGE UP (ref 0–0)
PLATELET # BLD AUTO: 230 K/UL — SIGNIFICANT CHANGE UP (ref 150–400)
POTASSIUM SERPL-MCNC: 3.4 MMOL/L — LOW (ref 3.5–5.3)
POTASSIUM SERPL-SCNC: 3.4 MMOL/L — LOW (ref 3.5–5.3)
PROTHROM AB SERPL-ACNC: 15.5 SEC — HIGH (ref 10.6–13.6)
RBC # BLD: 4.58 M/UL — SIGNIFICANT CHANGE UP (ref 4.2–5.8)
RBC # FLD: 14.9 % — HIGH (ref 10.3–14.5)
SARS-COV-2 RNA SPEC QL NAA+PROBE: SIGNIFICANT CHANGE UP
SODIUM SERPL-SCNC: 133 MMOL/L — LOW (ref 135–145)
WBC # BLD: 10.55 K/UL — HIGH (ref 3.8–10.5)
WBC # FLD AUTO: 10.55 K/UL — HIGH (ref 3.8–10.5)

## 2022-01-20 PROCEDURE — 99232 SBSQ HOSP IP/OBS MODERATE 35: CPT

## 2022-01-20 RX ORDER — INSULIN LISPRO 100/ML
11 VIAL (ML) SUBCUTANEOUS
Refills: 0 | Status: DISCONTINUED | OUTPATIENT
Start: 2022-01-20 | End: 2022-01-21

## 2022-01-20 RX ORDER — INSULIN GLARGINE 100 [IU]/ML
30 INJECTION, SOLUTION SUBCUTANEOUS AT BEDTIME
Refills: 0 | Status: DISCONTINUED | OUTPATIENT
Start: 2022-01-20 | End: 2022-01-21

## 2022-01-20 RX ORDER — POTASSIUM CHLORIDE 20 MEQ
20 PACKET (EA) ORAL ONCE
Refills: 0 | Status: COMPLETED | OUTPATIENT
Start: 2022-01-20 | End: 2022-01-20

## 2022-01-20 RX ORDER — AMPICILLIN SODIUM AND SULBACTAM SODIUM 250; 125 MG/ML; MG/ML
3 INJECTION, POWDER, FOR SUSPENSION INTRAMUSCULAR; INTRAVENOUS EVERY 8 HOURS
Refills: 0 | Status: DISCONTINUED | OUTPATIENT
Start: 2022-01-20 | End: 2022-01-21

## 2022-01-20 RX ADMIN — AMPICILLIN SODIUM AND SULBACTAM SODIUM 200 GRAM(S): 250; 125 INJECTION, POWDER, FOR SUSPENSION INTRAMUSCULAR; INTRAVENOUS at 22:06

## 2022-01-20 RX ADMIN — Medication 11 UNIT(S): at 08:42

## 2022-01-20 RX ADMIN — Medication 1: at 12:49

## 2022-01-20 RX ADMIN — TAMSULOSIN HYDROCHLORIDE 0.4 MILLIGRAM(S): 0.4 CAPSULE ORAL at 22:05

## 2022-01-20 RX ADMIN — Medication 11 UNIT(S): at 12:49

## 2022-01-20 RX ADMIN — Medication 1: at 18:00

## 2022-01-20 RX ADMIN — INSULIN GLARGINE 30 UNIT(S): 100 INJECTION, SOLUTION SUBCUTANEOUS at 23:40

## 2022-01-20 RX ADMIN — Medication 81 MILLIGRAM(S): at 11:52

## 2022-01-20 RX ADMIN — ENOXAPARIN SODIUM 30 MILLIGRAM(S): 100 INJECTION SUBCUTANEOUS at 11:52

## 2022-01-20 RX ADMIN — AMPICILLIN SODIUM AND SULBACTAM SODIUM 200 GRAM(S): 250; 125 INJECTION, POWDER, FOR SUSPENSION INTRAMUSCULAR; INTRAVENOUS at 13:35

## 2022-01-20 RX ADMIN — MUPIROCIN 1 APPLICATION(S): 20 OINTMENT TOPICAL at 22:04

## 2022-01-20 RX ADMIN — Medication 20 MILLIEQUIVALENT(S): at 18:55

## 2022-01-20 RX ADMIN — Medication 3 MILLIGRAM(S): at 22:05

## 2022-01-20 RX ADMIN — CLOPIDOGREL BISULFATE 75 MILLIGRAM(S): 75 TABLET, FILM COATED ORAL at 11:52

## 2022-01-20 RX ADMIN — ATORVASTATIN CALCIUM 80 MILLIGRAM(S): 80 TABLET, FILM COATED ORAL at 22:08

## 2022-01-20 RX ADMIN — MUPIROCIN 1 APPLICATION(S): 20 OINTMENT TOPICAL at 10:40

## 2022-01-20 RX ADMIN — PANTOPRAZOLE SODIUM 40 MILLIGRAM(S): 20 TABLET, DELAYED RELEASE ORAL at 05:46

## 2022-01-20 RX ADMIN — Medication 11 UNIT(S): at 18:01

## 2022-01-20 RX ADMIN — Medication 2: at 08:42

## 2022-01-20 NOTE — DIETITIAN INITIAL EVALUATION ADULT. - PERSON TAUGHT/METHOD
Provided verbal and written education on Nutrition Therapy for Type 2 Diabetes. Emphasis on pairing carbohydrates with protein for glycemic control; portion sizes; choosing whole grains vs refined carbohydrates; limiting refined sugars. Provided handout Type 2 Diabetes Nutrition Therapy. Good comprehension noted. Pt made aware RD to remain available for any questions./verbal instruction/written material/patient instructed

## 2022-01-20 NOTE — PROGRESS NOTE ADULT - ASSESSMENT
61M with PMH of HTN, CAD/MI s/p stents, HFrEF (EF 15-20%) s/p AICD, T2DM on insulin p/w foot ulcer. he has MANJIT on ckd III with baseline cr known to be 1.5 in 2020. he also has uncontrolled DM       1- MANJIT on ckd   2- cellulitis foot   3- DM   4- HTN   5- CHF/HFrEF  6- cardiomyopathy       manjit in setting of infection  cr is improving   post op restart lasix and entresto   strict I/O  iv vanco1 g qd  cefepime 1 g qd

## 2022-01-20 NOTE — DIETITIAN INITIAL EVALUATION ADULT. - REASON
Nutrition-focused physical examination deferred at this time - pt reporting good PO intake PTA. No overt signs of fat/muscle wasting visually observed.

## 2022-01-20 NOTE — CONSULT NOTE ADULT - ASSESSMENT
61M w/ left foot submet 2 wound and LLE cellulitis   - Patient seen and evaluated   - T 99.7, WBC 13.53, ESR (p), CRP 19.6,   - Exam:  Left foot submet 3 wound to bone, fibrogranular wound bed, no drainage/ purulence noted, no malodor, no tracking, left foot forefoot erythema extending to mid calf w/ increased edema to LLE  - Left foot xray: no gas, no OM   - Recommend admit to medicine   - Recommend IV antibiotics  - Left foot wound culture taken   - Recommend ID consult   - Left foot MRI ordered   - Ordered mupirocin for left foot wound   - Pod plan local wound care pending cellulitis resolution/MRI   - Discussed with attending 
61M with PMH of HTN, CAD/MI s/p stents, HFrEF (EF 15-20%) s/p AICD, T2DM on insulin p/w foot ulcer. he has MANJIT on ckd III with baseline cr known to be 1.5 in 2020. he also has uncontrolled DM       1- MANJIT on ckd   2- cellulitis foot   3- DM   4- HTN   5- CHF/HFrEF  6- cardiomyopathy       manjit in setting of infection hold entresto and lasix temporarily  received ivf in er.   trend cr   strict I/O  iv vanco1 g qd  cefepime 1 g qd  to have urinalysis and urine pro/cr ratio   
  Assessment  DMT2: 61y Male with DM T2 with hyperglycemia admitted with foot wound,  patient was insulin at home, now on basal bolus and insulin coverage, blood sugars running high, no hypoglycemic episode,  eating meals,  non compliant with low carb diet.  Patient is high risk with high level decision making due to uncontrolled diabetes, has increased risk for complications.  Foot wound: on medications, monitored, asymptomatic.  HTN: On antihypertensive medications, monitored, asymptomatic.  Overweight: No strict exercise routines, neither on low calorie diet.                 Cortney Flanagan MD  Cell: [phone]  Office: [phone]            
61M with PMH of HTN, CAD/MI s/p stents, HFrEF (EF 15-20%) s/p AICD, T2DM on insulin p/w foot ulcer with foot cellulitis, diabetic foot infection, leukocytosis, sepsis     Sam Orozco  Attending Physician   Division of Infectious Disease  Pager #[phone]  Available on [employer] Teams also  After 5pm/weekend or no response, call #[phone]

## 2022-01-20 NOTE — PROGRESS NOTE ADULT - SUBJECTIVE AND OBJECTIVE BOX
Podiatry pager #: [phone] ([facility])/ [phone] ([facility])    Patient is a 61y old  Male who presents with a chief complaint of foot ulcer (2022 11:56)       INTERVAL HPI/OVERNIGHT EVENTS:  Patient seen and evaluated at bedside.  Pt is resting comfortable in NAD. Denies N/V/F/C.     Allergies    No Known Allergies    Intolerances        Vital Signs Last 24 Hrs  T(C): 36.7 (2022 04:44), Max: 37.3 (2022 21:28)  T(F): 98.1 (2022 04:44), Max: 99.2 (2022 21:28)  HR: 101 (2022 04:44) (92 - 112)  BP: 104/66 (2022 04:44) (104/66 - 136/71)  BP(mean): --  RR: 18 (2022 04:44) (17 - 18)  SpO2: 93% (2022 04:44) (93% - 97%)    LABS:                        11.0   10.55 )-----------( 230      ( 2022 06:52 )             34.2     01-20    133<L>  |  94<L>  |  72<H>  ----------------------------<  203<H>  3.4<L>   |  22  |  2.07<H>    Ca    9.3      2022 06:52      PT/INR - ( 2022 06:55 )   PT: 15.5 sec;   INR: 1.31 ratio           Urinalysis Basic - ( 2022 06:25 )    Color: Light Yellow / Appearance: Clear / S.014 / pH: x  Gluc: x / Ketone: Negative  / Bili: Negative / Urobili: Negative   Blood: x / Protein: 30 mg/dL / Nitrite: Negative   Leuk Esterase: Negative / RBC: 2 /hpf / WBC 1 /HPF   Sq Epi: x / Non Sq Epi: 0 /hpf / Bacteria: Negative      CAPILLARY BLOOD GLUCOSE      POCT Blood Glucose.: 167 mg/dL (2022 12:21)  POCT Blood Glucose.: 223 mg/dL (2022 08:39)  POCT Blood Glucose.: 272 mg/dL (2022 21:51)  POCT Blood Glucose.: 283 mg/dL (2022 17:32)      Lower Extremity Physical Exam:  Vasular: DP/PT 2/4, B/L, CFT <3 seconds B/L, Temperature gradient warm to warm on the L and warm to cool on the R,   Neuro: Epicritic sensation diminished to the level of ankle, B/L.  Musculoskeletal/Ortho: unremarkable  Skin: Left foot submet 3 wound to bone, fibrogranular wound bed, no drainage/ purulence noted, no malodor, no tracking, left foot forefoot erythema extending to mid calf w/ increased edema to LLE    RADIOLOGY & ADDITIONAL TESTS:

## 2022-01-20 NOTE — CONSULT NOTE ADULT - PROBLEM SELECTOR RECOMMENDATION 4
Continue medications, monitoring, FU primary team recommendations. .
-from cellulitis  -cont abx  -trend cbc

## 2022-01-20 NOTE — DIETITIAN INITIAL EVALUATION ADULT. - PERTINENT MEDS FT
MEDICATIONS  (STANDING):  ampicillin/sulbactam  IVPB 3 Gram(s) IV Intermittent every 24 hours  aspirin  chewable 81 milliGRAM(s) Oral daily  atorvastatin 80 milliGRAM(s) Oral at bedtime  clopidogrel Tablet 75 milliGRAM(s) Oral daily  dextrose 40% Gel 15 Gram(s) Oral once  dextrose 5%. 1000 milliLiter(s) (50 mL/Hr) IV Continuous <Continuous>  dextrose 5%. 1000 milliLiter(s) (100 mL/Hr) IV Continuous <Continuous>  dextrose 50% Injectable 25 Gram(s) IV Push once  dextrose 50% Injectable 12.5 Gram(s) IV Push once  dextrose 50% Injectable 25 Gram(s) IV Push once  enoxaparin Injectable 30 milliGRAM(s) SubCutaneous daily  glucagon  Injectable 1 milliGRAM(s) IntraMuscular once  influenza   Vaccine 0.5 milliLiter(s) IntraMuscular once  insulin glargine Injectable (LANTUS) 60 Unit(s) SubCutaneous at bedtime  insulin lispro (ADMELOG) corrective regimen sliding scale   SubCutaneous three times a day before meals  insulin lispro (ADMELOG) corrective regimen sliding scale   SubCutaneous at bedtime  insulin lispro Injectable (ADMELOG) 11 Unit(s) SubCutaneous three times a day before meals  mupirocin 2% Ointment 1 Application(s) Topical two times a day  pantoprazole    Tablet 40 milliGRAM(s) Oral before breakfast  tamsulosin 0.4 milliGRAM(s) Oral at bedtime    MEDICATIONS  (PRN):  acetaminophen     Tablet .. 650 milliGRAM(s) Oral every 6 hours PRN Temp greater or equal to 38C (100.4F), Mild Pain (1 - 3)  ALPRAZolam 0.25 milliGRAM(s) Oral two times a day PRN anxiety  aluminum hydroxide/magnesium hydroxide/simethicone Suspension 30 milliLiter(s) Oral every 4 hours PRN Dyspepsia  melatonin 3 milliGRAM(s) Oral at bedtime PRN Insomnia  ondansetron Injectable 4 milliGRAM(s) IV Push every 8 hours PRN Nausea and/or Vomiting

## 2022-01-20 NOTE — PROGRESS NOTE ADULT - ENMT
Problem: Mobility Impaired (Adult and Pediatric)  Goal: *Acute Goals and Plan of Care (Insert Text)  Physical Therapy Goals  Initiated 2/8/2017 and to be accomplished within 7 day(s)  1. Patient will move from supine to sit and sit to supine , scoot up and down and roll side to side in bed with modified independence. 2. Patient will transfer from bed to chair and chair to bed with modified independence using the least restrictive device. 3. Patient will perform sit to stand with modified independence. 4. Patient will ambulate with modified independence for 150 feet with the least restrictive device. 5. Patient will ascend/descend 10 stairs with 1 handrail(s) with modified independence. PHYSICAL THERAPY TREATMENT     Patient: Shira Sears (22 y.o. male)  Date: 2/9/2017  Diagnosis: Left sided numbness  Left sided numbness <principal problem not specified>       Precautions:   Chart, physical therapy assessment, plan of care and goals were reviewed. ASSESSMENT:  Increased functional independence noted throughout mobility as pt has achieved goals 2-4 and partially achieved stairs goal.  Will benefit from cont'd PT intervention to promote maximal proficiency with stair negotiation and increased activity tolerance. Progression toward goals:  [X]      Improving appropriately and progressing toward goals  [ ]      Improving slowly and progressing toward goals  [ ]      Not making progress toward goals and plan of care will be adjusted       PLAN:  Patient continues to benefit from skilled intervention to address the above impairments. Continue treatment per established plan of care. Discharge Recommendations:  None  Further Equipment Recommendations for Discharge:  N/A       SUBJECTIVE:   Patient stated I feel good. That'll go away.   Pt referring to antalgic gait pattern.       OBJECTIVE DATA SUMMARY:   Critical Behavior:  Neurologic State: Alert  Orientation Level: Oriented X4  Cognition: Follows commands     Functional Mobility Training:  Transfers:  Sit to Stand: Independent  Stand to Sit: Independent  Bed to Chair: Independent  Other: stand step without AD  Balance:  Sitting: Intact  Standing: Intact  Ambulation/Gait Training:  Distance (ft): 200 Feet (ft)  Assistive Device:  (0 AD)  Ambulation - Level of Assistance: Modified independent  Gait Abnormalities: Antalgic;Decreased step clearance;Trunk sway increased  Base of Support: Widened  Stance: Right decreased; Left decreased  Speed/Patti: Fluctuations  Interventions: Verbal cues  Stairs:  Number of Stairs Trained: 10  Stairs - Level of Assistance: Supervision  Rail Use: Both     Therapeutic Exercises:   Standing: hip x's 3 and deep squat at EOB x's 10 each  Pain:  Pt reports 0/10 pain or discomfort prior to treatment. Pt reports 0/10 pain or discomfort post treatment. Activity Tolerance:   Fair+  Please refer to the flowsheet for vital signs taken during this treatment.   After treatment:   [X] Patient left in no apparent distress sitting up in chair  [ ] Patient left in no apparent distress in bed  [X] Call bell left within reach  [ ] Nursing notified  [ ] Caregiver present  [ ] Bed alarm activated      Zohaib Chamorro PTA   Time Calculation: 23 mins detailed exam ear L/ear R/nose/mouth/pharynx details…

## 2022-01-20 NOTE — DIETITIAN INITIAL EVALUATION ADULT. - PERTINENT LABORATORY DATA
01-20 Na133 mmol/L<L> Glu 203 mg/dL<H> K+ 3.4 mmol/L<L> Cr  2.07 mg/dL<H> BUN 72 mg/dL<H>    A1C with Estimated Average Glucose Result: 14.0 % (01-18-22 @ 10:35)    CAPILLARY BLOOD GLUCOSE  POCT Blood Glucose.: 167 mg/dL (20 Jan 2022 12:21)  POCT Blood Glucose.: 223 mg/dL (20 Jan 2022 08:39)  POCT Blood Glucose.: 272 mg/dL (19 Jan 2022 21:51)  POCT Blood Glucose.: 283 mg/dL (19 Jan 2022 17:32)

## 2022-01-20 NOTE — DIETITIAN INITIAL EVALUATION ADULT. - CHIEF COMPLAINT
"61M with PMH of HTN, CAD/MI s/p stents, HFrEF (EF 15-20%) s/p AICD, T2DM on insulin p/w non healing foot ulcer after bunionectomy 5weeks ago, now with acutely worsening pain, swelling and erythema, a/w sepsis 2/2 cellulitis and r/o OM. "

## 2022-01-20 NOTE — CONSULT NOTE ADULT - PROBLEM SELECTOR RECOMMENDATION 9
Will continue Lantus 60 units at bed time.  Will start Admelog 11 units before each meal in addition to Admelog correction scale coverage.  Patient counseled for compliance with consistent low carb diet.
-from left foot infection  -concern for OM given wound tracking down to bone  -for MRI foot  -surgical and loca care plans per podiatry  -vancomycin 1 gm iv q24  -DC zosyn given nephrotoxic potential   -cefepime 1 gm iv q24 for GNR/psuedomonas coverage  -monitor creatinine, vanco torugh  -monitor cbc and vitals/temps

## 2022-01-20 NOTE — CONSULT NOTE ADULT - PROBLEM SELECTOR RECOMMENDATION 2
Suggest to continue medications, monitoring, FU primary team recommendations. .
-abx as above  -planned for MRI

## 2022-01-20 NOTE — PROGRESS NOTE ADULT - SUBJECTIVE AND OBJECTIVE BOX
[facility]   [phone]  RENAL FOLLOW UP NOTE  --------------------------------------------------------------------------------  Chief Complaint:    24 hour events/subjective:    seen earlier. denkeny chills.   states he decreased his insulin dose to 1/2 dose at home due to high cost.   denies shortness of breath     PAST HISTORY  --------------------------------------------------------------------------------  No significant changes to PMH, PSH, FHx, SHx, unless otherwise noted    ALLERGIES & MEDICATIONS  --------------------------------------------------------------------------------  Allergies    No Known Allergies    Intolerances      Standing Inpatient Medications  ampicillin/sulbactam  IVPB 3 Gram(s) IV Intermittent every 8 hours  aspirin  chewable 81 milliGRAM(s) Oral daily  atorvastatin 80 milliGRAM(s) Oral at bedtime  clopidogrel Tablet 75 milliGRAM(s) Oral daily  dextrose 40% Gel 15 Gram(s) Oral once  dextrose 5%. 1000 milliLiter(s) IV Continuous <Continuous>  dextrose 5%. 1000 milliLiter(s) IV Continuous <Continuous>  dextrose 50% Injectable 25 Gram(s) IV Push once  dextrose 50% Injectable 12.5 Gram(s) IV Push once  dextrose 50% Injectable 25 Gram(s) IV Push once  enoxaparin Injectable 30 milliGRAM(s) SubCutaneous daily  glucagon  Injectable 1 milliGRAM(s) IntraMuscular once  influenza   Vaccine 0.5 milliLiter(s) IntraMuscular once  insulin glargine Injectable (LANTUS) 30 Unit(s) SubCutaneous at bedtime  insulin lispro (ADMELOG) corrective regimen sliding scale   SubCutaneous three times a day before meals  insulin lispro (ADMELOG) corrective regimen sliding scale   SubCutaneous at bedtime  insulin lispro Injectable (ADMELOG) 11 Unit(s) SubCutaneous three times a day before meals  mupirocin 2% Ointment 1 Application(s) Topical two times a day  pantoprazole    Tablet 40 milliGRAM(s) Oral before breakfast  tamsulosin 0.4 milliGRAM(s) Oral at bedtime    PRN Inpatient Medications  acetaminophen     Tablet .. 650 milliGRAM(s) Oral every 6 hours PRN  ALPRAZolam 0.25 milliGRAM(s) Oral two times a day PRN  aluminum hydroxide/magnesium hydroxide/simethicone Suspension 30 milliLiter(s) Oral every 4 hours PRN  melatonin 3 milliGRAM(s) Oral at bedtime PRN  ondansetron Injectable 4 milliGRAM(s) IV Push every 8 hours PRN      REVIEW OF SYSTEMS  --------------------------------------------------------------------------------    Gen: denies fevers/chills,  CVS: denies chest pain/palpitations  Resp: denies SOB/Cough  GI: Denies N/V/Abd pain  : Denies dysuria/oliguria/hematuria      VITALS/PHYSICAL EXAM  --------------------------------------------------------------------------------  T(C): 37 (01-20-22 @ 21:08), Max: 37.2 (01-20-22 @ 14:52)  HR: 91 (01-20-22 @ 21:08) (91 - 101)  BP: 104/68 (01-20-22 @ 21:08) (93/53 - 104/68)  RR: 18 (01-20-22 @ 21:08) (18 - 18)  SpO2: 97% (01-20-22 @ 21:08) (93% - 97%)  Wt(kg): --  Height (cm): 195.6 (01-19-22 @ 16:29)  Weight (kg): 127.9 (01-19-22 @ 16:29)  BMI (kg/m2): 33.4 (01-19-22 @ 16:29)  BSA (m2): 2.59 (01-19-22 @ 16:29)      01-19-22 @ 07:01  -  01-20-22 @ 07:00  --------------------------------------------------------  IN: 340 mL / OUT: 0 mL / NET: 340 mL    01-20-22 @ 07:01  -  01-20-22 @ 22:24  --------------------------------------------------------  IN: 340 mL / OUT: 0 mL / NET: 340 mL      Physical Exam:  	    Gen: Non toxic comfortable appearing   	no jvd  	Pulm: decrease bs  no rales or ronchi or wheezing  	CV: RRR, S1S2; no rub  	Back: No CVA tenderness  	Abd: +BS, soft, nontender/nondistended  	: No suprapubic tenderness  	UE: Warm, no cyanosis  no clubbing,  trace RLE  edema  	LE: Warm, left leg edema 2- ; rle no edema left foot dressing   	Neuro: alert and oriented. speech coherent       LABS/STUDIES  --------------------------------------------------------------------------------              11.0   10.55 >-----------<  230      [01-20-22 @ 06:52]              34.2     133  |  94  |  72  ----------------------------<  203      [01-20-22 @ 06:52]  3.4   |  22  |  2.07        Ca     9.3     [01-20-22 @ 06:52]      PT/INR: PT 15.5 , INR 1.31       [01-20-22 @ 06:55]      Creatinine Trend:  SCr 2.07 [01-20 @ 06:52]  SCr 2.57 [01-19 @ 17:20]  SCr 2.92 [01-19 @ 07:16]  SCr 2.49 [01-17 @ 15:04]              Urinalysis - [01-19-22 @ 06:25]      Color Light Yellow / Appearance Clear / SG 1.014 / pH 5.5      Gluc Negative / Ketone Negative  / Bili Negative / Urobili Negative       Blood Negative / Protein 30 mg/dL / Leuk Est Negative / Nitrite Negative      RBC 2 / WBC 1 / Hyaline 4 / Gran  / Sq Epi  / Non Sq Epi 0 / Bacteria Negative    Urine Creatinine 137      [01-19-22 @ 06:25]  Urine Protein 26      [01-19-22 @ 06:25]    HbA1c 8.9      [12-16-19 @ 08:15]

## 2022-01-20 NOTE — DIETITIAN INITIAL EVALUATION ADULT. - LITERATURE/VIDEOS GIVEN
Carbohydrate Counting for People with Diabetes, Using Nutrition Labels: Carbohydrates, and Diabetes Label Reading Tips handouts.

## 2022-01-20 NOTE — DIETITIAN INITIAL EVALUATION ADULT. - ORAL INTAKE PTA/DIET HISTORY
Pt interviewed at bedside. Reports good appetite PTA, not following any therapeutic diet. Does diet pepsi and rye bread for glucose control. Confirms NKFA. Pt denies use of dietary supplements/micronutrients. Pt denies history of chewing/swallowing difficulty.

## 2022-01-20 NOTE — PROGRESS NOTE ADULT - SUBJECTIVE AND OBJECTIVE BOX
SEVERIANO MARTINEZ  61y Male  MRN:[id number]    Patient is a 61y old  Male who presents with a chief complaint of foot ulcer (18 Jan 2022 12:11)    HPI:  61M with PMH of HTN, CAD/MI s/p stents, HFrEF (EF 15-20%) s/p AICD, T2DM on insulin p/w foot ulcer. Pt had bunionectomy of L foot approx 5 weeks ago with poor wound healing since that time. In the past 2 weeks had started noticing increasing pain, swelling and erythema at site of the wound and was started on levaquin in 1/4/21 x 10 days without any improvement in symptoms. Was started on clindamycin 2-3 days ago without any improvement. Pt states he has 5/10 intermittent sharp pain at site of the wound and initially had only localized swelling and erythema; however, in past 24 hours started noticing the swelling and erythema moving proximally, now with swelling to his knees and erythema to mid calf. Pain has also worsened but remains localized, non radiating but worse with weight bearing or ambulation; has been taking Tylenol or Advil for pain with some relief. Endorses minimal clear discharge from wound. Went to see podiatrist today who referred him to ED for further evaluation. Denies any fevers, +chills ongoing x weeks, +1 episode of nausea with vomiting this morning, no abd pain, no CP, SOB, no GI or  symptoms.  (17 Jan 2022 23:43)      Patient seen and evaluated at bedside. No acute events overnight except as noted.    Interval HPI: no acute events o/n     PAST MEDICAL & SURGICAL HISTORY:  Stented coronary artery    Diabetes    AICD (automatic cardioverter/defibrillator) present    Hypertension    Heart failure with reduced ejection fraction    History of ischemic cardiomyopathy    History of COPD    H/O gastroesophageal reflux (GERD)    H/O vasectomy  20 yrs ago (2000)        REVIEW OF SYSTEMS:  as per hpi     VITALS:   Vital Signs Last 24 Hrs  T(C): 36.7 (20 Jan 2022 04:44), Max: 37.3 (19 Jan 2022 21:28)  T(F): 98.1 (20 Jan 2022 04:44), Max: 99.2 (19 Jan 2022 21:28)  HR: 101 (20 Jan 2022 04:44) (92 - 112)  BP: 104/66 (20 Jan 2022 04:44) (104/66 - 136/71)  BP(mean): --  RR: 18 (20 Jan 2022 04:44) (17 - 18)  SpO2: 93% (20 Jan 2022 04:44) (93% - 97%)      PHYSICAL EXAM:  GENERAL: NAD, well-developed  HEAD:  Atraumatic, Normocephalic  EYES: EOMI, PERRLA, conjunctiva and sclera clear  NECK: Supple, No JVD  CHEST/LUNG: Clear to auscultation bilaterally; No wheeze  HEART: S1, S2; No murmurs, rubs, or gallops  ABDOMEN: Soft, Nontender, Nondistended; Bowel sounds present  EXTREMITIES:  2+ Peripheral Pulses, No clubbing, cyanosis, or edema. LLE cellulitis   PSYCH: Normal affect  NEUROLOGY: AAOX3; non-focal  SKIN: No rashes or lesions    Consultant(s) Notes Reviewed:  [x ] YES  [ ] NO  Care Discussed with Consultants/Other Providers [ x] YES  [ ] NO    MEDS:   MEDICATIONS  (STANDING):  ampicillin/sulbactam  IVPB 3 Gram(s) IV Intermittent every 24 hours  aspirin  chewable 81 milliGRAM(s) Oral daily  atorvastatin 80 milliGRAM(s) Oral at bedtime  clopidogrel Tablet 75 milliGRAM(s) Oral daily  dextrose 40% Gel 15 Gram(s) Oral once  dextrose 5%. 1000 milliLiter(s) (50 mL/Hr) IV Continuous <Continuous>  dextrose 5%. 1000 milliLiter(s) (100 mL/Hr) IV Continuous <Continuous>  dextrose 50% Injectable 25 Gram(s) IV Push once  dextrose 50% Injectable 12.5 Gram(s) IV Push once  dextrose 50% Injectable 25 Gram(s) IV Push once  enoxaparin Injectable 30 milliGRAM(s) SubCutaneous daily  glucagon  Injectable 1 milliGRAM(s) IntraMuscular once  influenza   Vaccine 0.5 milliLiter(s) IntraMuscular once  insulin glargine Injectable (LANTUS) 60 Unit(s) SubCutaneous at bedtime  insulin lispro (ADMELOG) corrective regimen sliding scale   SubCutaneous three times a day before meals  insulin lispro (ADMELOG) corrective regimen sliding scale   SubCutaneous at bedtime  insulin lispro Injectable (ADMELOG) 11 Unit(s) SubCutaneous three times a day before meals  mupirocin 2% Ointment 1 Application(s) Topical two times a day  pantoprazole    Tablet 40 milliGRAM(s) Oral before breakfast  tamsulosin 0.4 milliGRAM(s) Oral at bedtime    MEDICATIONS  (PRN):  acetaminophen     Tablet .. 650 milliGRAM(s) Oral every 6 hours PRN Temp greater or equal to 38C (100.4F), Mild Pain (1 - 3)  ALPRAZolam 0.25 milliGRAM(s) Oral two times a day PRN anxiety  aluminum hydroxide/magnesium hydroxide/simethicone Suspension 30 milliLiter(s) Oral every 4 hours PRN Dyspepsia  melatonin 3 milliGRAM(s) Oral at bedtime PRN Insomnia  ondansetron Injectable 4 milliGRAM(s) IV Push every 8 hours PRN Nausea and/or Vomiting      ALLERGIES:  No Known Allergies      LABS:                                                         11.0   10.55 )-----------( 230      ( 20 Jan 2022 06:52 )             34.2   01-20    133<L>  |  94<L>  |  72<H>  ----------------------------<  203<H>  3.4<L>   |  22  |  2.07<H>    Ca    9.3      20 Jan 2022 06:52      < from: MR Foot w/wo IV Cont, Left (01.19.22 @ 17:06) >  IMPRESSION:    Plantar wound at the level of the third metatarsal head with   osteomyelitis within the metatarsal head as well as in the proximal   phalanx of the third digit.    --- End of Report ---    < end of copied text >     < from: [facility] Duplex Lower Ext Vein Scan, Left (01.17.22 @ 20:05) >    IMPRESSION:  No evidence of left lower extremity deep venous thrombosis.      < end of copied text >  < from: Xray Tibia + Fibula 2 Views, Left (01.17.22 @ 15:46) >  IMPRESSION:  Soft tissue swelling about the ankle and proximal foot, without   subcutaneous emphysema or radiographic evidence of necrotizing fasciitis.    No acute fracture or dislocation.    < end of copied text >

## 2022-01-20 NOTE — DIETITIAN INITIAL EVALUATION ADULT. - OTHER INFO
Pt reports good appetite with good PO intake in-house. Denies current chewing/swallowing difficulty.     Reports diarrhea for the last several days - has been having crackers to help and reports it's been helping. Denies nausea, vomiting, constipation. Reports last BM 1/20. Pt not currently on bowel regimen.     Per EMR, pt currently ordered for Unasyn, ademlog before meals, lantus, and SSI admelog in-house.    Reports  lbs (01-19) Denies recent wt changes.   Dosing wt: 281 lbs (01-19).   Wt history per chart: 277 lbs (1/20 - RD obtained bedscale weight), 263 lbs (01-17), 274 lbs (12/15/2020), 270 (12/16/2019).   Wt fluctuations likely 2/2 edema/fluid shifts. RD to continue to monitor weight trends as able/available.     Pt reports he takes humalog and lantus for his DM at home. Reports he is supposed to check his sugar 3x/day but only checks 1x/day. Reports his sugar can go as low as 60 mg/dl, and he has seen it go as high as "go to hospital" message, unsure what number reading this message indicates. Reports average reading is in the 200s. A1c 14% indicates poor glycemic control.    Pt made aware RD remains available.

## 2022-01-20 NOTE — CONSULT NOTE ADULT - PROBLEM SELECTOR RECOMMENDATION 3
On medications,  no chest pain, stable, monitored and followed up by primary  team/cardiology team
-DM control  -cont abx  -monitor temps/wbc

## 2022-01-20 NOTE — PROGRESS NOTE ADULT - ASSESSMENT
61M with PMH of HTN, CAD/MI s/p stents, HFrEF (EF 15-20%) s/p AICD, T2DM on insulin p/w foot ulcer with foot cellulitis, diabetic foot infection, leukocytosis, sepsis     Sam Orozco  Attending Physician   Division of Infectious Disease  Pager #[phone]  Available on [employer] Teams also  After 5pm/weekend or no response, call #[phone]

## 2022-01-20 NOTE — DIETITIAN INITIAL EVALUATION ADULT. - ADD RECOMMEND
1) Continue Consistent Carbohydrate/DASH diet.          2) Recommend Danactive 2x/day for diarrhea, pending no medical contraindications.         3) Monitor PO intake, GI tolerance, skin integrity, labs, weight.          4) RD remains available upon request and will follow-up per protocol.

## 2022-01-20 NOTE — CONSULT NOTE ADULT - SUBJECTIVE AND OBJECTIVE BOX
HPI:  61M with PMH of HTN, CAD/MI s/p stents, HFrEF (EF 15-20%) s/p AICD, T2DM on insulin p/w foot ulcer. Pt had bunionectomy of L foot approx 5 weeks ago with poor wound healing since that time. In the past 2 weeks had started noticing increasing pain, swelling and erythema at site of the wound and was started on levaquin in 1/4/21 x 10 days without any improvement in symptoms. Was started on clindamycin 2-3 days ago without any improvement. Pt states he has 5/10 intermittent sharp pain at site of the wound and initially had only localized swelling and erythema; however, in past 24 hours started noticing the swelling and erythema moving proximally, now with swelling to his knees and erythema to mid calf. Pain has also worsened but remains localized, non radiating but worse with weight bearing or ambulation; has been taking Tylenol or Advil for pain with some relief. Endorses minimal clear discharge from wound. Went to see podiatrist today who referred him to ED for further evaluation. Denies any fevers, +chills ongoing x weeks, +1 episode of nausea with vomiting this morning, no abd pain, no CP, SOB, no GI or  symptoms.  (17 Jan 2022 23:43)  Patient has history of diabetes, on oral meds at home,  on insulin at home, no recent hypoglycemic episodes, no polyuria polydipsia. Patient follows up with PCP for diabetes management.    PAST MEDICAL & SURGICAL HISTORY:  Stented coronary artery    Diabetes    AICD (automatic cardioverter/defibrillator) present    Hypertension    Heart failure with reduced ejection fraction    History of ischemic cardiomyopathy    History of COPD    H/O gastroesophageal reflux (GERD)    H/O vasectomy  20 yrs ago (2000)        FAMILY HISTORY:  Family history of COPD (chronic obstructive pulmonary disease) (Sibling)    Family history of cardiac disorder  Paternal        Social History:    Outpatient Medications:    MEDICATIONS  (STANDING):  ampicillin/sulbactam  IVPB 3 Gram(s) IV Intermittent every 24 hours  aspirin  chewable 81 milliGRAM(s) Oral daily  atorvastatin 80 milliGRAM(s) Oral at bedtime  clopidogrel Tablet 75 milliGRAM(s) Oral daily  dextrose 40% Gel 15 Gram(s) Oral once  dextrose 5%. 1000 milliLiter(s) (50 mL/Hr) IV Continuous <Continuous>  dextrose 5%. 1000 milliLiter(s) (100 mL/Hr) IV Continuous <Continuous>  dextrose 50% Injectable 25 Gram(s) IV Push once  dextrose 50% Injectable 12.5 Gram(s) IV Push once  dextrose 50% Injectable 25 Gram(s) IV Push once  enoxaparin Injectable 30 milliGRAM(s) SubCutaneous daily  glucagon  Injectable 1 milliGRAM(s) IntraMuscular once  influenza   Vaccine 0.5 milliLiter(s) IntraMuscular once  insulin glargine Injectable (LANTUS) 60 Unit(s) SubCutaneous at bedtime  insulin lispro (ADMELOG) corrective regimen sliding scale   SubCutaneous three times a day before meals  insulin lispro (ADMELOG) corrective regimen sliding scale   SubCutaneous at bedtime  insulin lispro Injectable (ADMELOG) 11 Unit(s) SubCutaneous three times a day before meals  mupirocin 2% Ointment 1 Application(s) Topical two times a day  pantoprazole    Tablet 40 milliGRAM(s) Oral before breakfast  tamsulosin 0.4 milliGRAM(s) Oral at bedtime    MEDICATIONS  (PRN):  acetaminophen     Tablet .. 650 milliGRAM(s) Oral every 6 hours PRN Temp greater or equal to 38C (100.4F), Mild Pain (1 - 3)  ALPRAZolam 0.25 milliGRAM(s) Oral two times a day PRN anxiety  aluminum hydroxide/magnesium hydroxide/simethicone Suspension 30 milliLiter(s) Oral every 4 hours PRN Dyspepsia  melatonin 3 milliGRAM(s) Oral at bedtime PRN Insomnia  ondansetron Injectable 4 milliGRAM(s) IV Push every 8 hours PRN Nausea and/or Vomiting      Allergies    No Known Allergies    Intolerances      Review of Systems:  Constitutional: No fever, no chills  Eyes: No blurry vision  Neuro: No tremors  HEENT: No pain, no neck swelling  Cardiovascular: No chest pain, no palpitations  Respiratory: No SOB, no cough  GI: No nausea, vomiting, abdominal pain  : No dysuria  Skin: no rash  MSK: Has leg swelling.  Psych: no depression  Endocrine: no polyuria, polydipsia    UNABLE TO OBTAIN    ALL OTHER SYSTEMS REVIEWED AND NEGATIVE        PHYSICAL EXAM:  VITALS: T(C): 36.7 (01-20-22 @ 04:44)  T(F): 98.1 (01-20-22 @ 04:44), Max: 99.2 (01-19-22 @ 21:28)  HR: 101 (01-20-22 @ 04:44) (92 - 112)  BP: 104/66 (01-20-22 @ 04:44) (96/64 - 136/71)  RR:  (17 - 18)  SpO2:  (93% - 97%)  Wt(kg): --  GENERAL: NAD, well-groomed, well-developed  EYES: No proptosis, no lid lag  HEENT:  Atraumatic, Normocephalic  THYROID: Normal size, no palpable nodules  RESPIRATORY: Clear to auscultation bilaterally; No rales, rhonchi, wheezing  CARDIOVASCULAR: Si S2, No murmurs;  GI: Soft, non distended, normal bowel sounds  SKIN: Dry, intact, No rashes or lesions  MUSCULOSKELETAL: Has BL lower extremity edema.  NEURO:  no tremor, sensation decreased in feet BL,    POCT Blood Glucose.: 223 mg/dL (01-20-22 @ 08:39)  POCT Blood Glucose.: 272 mg/dL (01-19-22 @ 21:51)  POCT Blood Glucose.: 283 mg/dL (01-19-22 @ 17:32)  POCT Blood Glucose.: 303 mg/dL (01-19-22 @ 12:13)  POCT Blood Glucose.: 213 mg/dL (01-19-22 @ 09:07)  POCT Blood Glucose.: 246 mg/dL (01-18-22 @ 21:15)  POCT Blood Glucose.: 294 mg/dL (01-18-22 @ 17:21)  POCT Blood Glucose.: 326 mg/dL (01-18-22 @ 13:41)  POCT Blood Glucose.: 347 mg/dL (01-18-22 @ 07:37)  POCT Blood Glucose.: 365 mg/dL (01-18-22 @ 01:05)                            11.0   10.55 )-----------( 230      ( 20 Jan 2022 06:52 )             34.2       01-20    133<L>  |  94<L>  |  72<H>  ----------------------------<  203<H>  3.4<L>   |  22  |  2.07<H>    EGFR if : 39<L>  EGFR if non : 34<L>    Ca    9.3      01-20  Mg     2.3     01-17  Phos  3.8     01-17    TPro  8.2  /  Alb  4.0  /  TBili  0.7  /  DBili  x   /  AST  15  /  ALT  15  /  AlkPhos  98  01-17      Thyroid Function Tests:              Radiology:

## 2022-01-21 ENCOUNTER — TRANSCRIPTION ENCOUNTER (OUTPATIENT)
Age: 62
End: 2022-01-21

## 2022-01-21 ENCOUNTER — RESULT REVIEW (OUTPATIENT)
Age: 62
End: 2022-01-21

## 2022-01-21 LAB
BLD GP AB SCN SERPL QL: NEGATIVE — SIGNIFICANT CHANGE UP
GLUCOSE BLDC GLUCOMTR-MCNC: 179 MG/DL — HIGH (ref 70–99)
GLUCOSE BLDC GLUCOMTR-MCNC: 195 MG/DL — HIGH (ref 70–99)
GLUCOSE BLDC GLUCOMTR-MCNC: 211 MG/DL — HIGH (ref 70–99)
GLUCOSE BLDC GLUCOMTR-MCNC: 224 MG/DL — HIGH (ref 70–99)
HCT VFR BLD CALC: 34.2 % — LOW (ref 39–50)
HGB BLD-MCNC: 10.7 G/DL — LOW (ref 13–17)
MCHC RBC-ENTMCNC: 23.8 PG — LOW (ref 27–34)
MCHC RBC-ENTMCNC: 31.3 GM/DL — LOW (ref 32–36)
MCV RBC AUTO: 76 FL — LOW (ref 80–100)
NRBC # BLD: 0 /100 WBCS — SIGNIFICANT CHANGE UP (ref 0–0)
PLATELET # BLD AUTO: 244 K/UL — SIGNIFICANT CHANGE UP (ref 150–400)
RBC # BLD: 4.5 M/UL — SIGNIFICANT CHANGE UP (ref 4.2–5.8)
RBC # FLD: 14.8 % — HIGH (ref 10.3–14.5)
RH IG SCN BLD-IMP: POSITIVE — SIGNIFICANT CHANGE UP
WBC # BLD: 9.16 K/UL — SIGNIFICANT CHANGE UP (ref 3.8–10.5)
WBC # FLD AUTO: 9.16 K/UL — SIGNIFICANT CHANGE UP (ref 3.8–10.5)

## 2022-01-21 PROCEDURE — 73630 X-RAY EXAM OF FOOT: CPT | Mod: 26,LT

## 2022-01-21 PROCEDURE — 88305 TISSUE EXAM BY PATHOLOGIST: CPT | Mod: 26

## 2022-01-21 PROCEDURE — 99232 SBSQ HOSP IP/OBS MODERATE 35: CPT

## 2022-01-21 PROCEDURE — 88311 DECALCIFY TISSUE: CPT | Mod: 26

## 2022-01-21 RX ORDER — MUPIROCIN 20 MG/G
1 OINTMENT TOPICAL
Refills: 0 | Status: DISCONTINUED | OUTPATIENT
Start: 2022-01-21 | End: 2022-01-25

## 2022-01-21 RX ORDER — ASPIRIN/CALCIUM CARB/MAGNESIUM 324 MG
81 TABLET ORAL DAILY
Refills: 0 | Status: DISCONTINUED | OUTPATIENT
Start: 2022-01-21 | End: 2022-01-25

## 2022-01-21 RX ORDER — FUROSEMIDE 40 MG
40 TABLET ORAL DAILY
Refills: 0 | Status: DISCONTINUED | OUTPATIENT
Start: 2022-01-22 | End: 2022-01-25

## 2022-01-21 RX ORDER — INSULIN LISPRO 100/ML
VIAL (ML) SUBCUTANEOUS AT BEDTIME
Refills: 0 | Status: DISCONTINUED | OUTPATIENT
Start: 2022-01-21 | End: 2022-01-25

## 2022-01-21 RX ORDER — SODIUM CHLORIDE 9 MG/ML
1000 INJECTION, SOLUTION INTRAVENOUS
Refills: 0 | Status: DISCONTINUED | OUTPATIENT
Start: 2022-01-21 | End: 2022-01-25

## 2022-01-21 RX ORDER — ALPRAZOLAM 0.25 MG
0.25 TABLET ORAL
Refills: 0 | Status: DISCONTINUED | OUTPATIENT
Start: 2022-01-21 | End: 2022-01-25

## 2022-01-21 RX ORDER — ACETAMINOPHEN 500 MG
650 TABLET ORAL EVERY 6 HOURS
Refills: 0 | Status: DISCONTINUED | OUTPATIENT
Start: 2022-01-21 | End: 2022-01-25

## 2022-01-21 RX ORDER — INSULIN LISPRO 100/ML
11 VIAL (ML) SUBCUTANEOUS
Refills: 0 | Status: DISCONTINUED | OUTPATIENT
Start: 2022-01-21 | End: 2022-01-22

## 2022-01-21 RX ORDER — DEXTROSE 50 % IN WATER 50 %
12.5 SYRINGE (ML) INTRAVENOUS ONCE
Refills: 0 | Status: DISCONTINUED | OUTPATIENT
Start: 2022-01-21 | End: 2022-01-25

## 2022-01-21 RX ORDER — DEXTROSE 50 % IN WATER 50 %
15 SYRINGE (ML) INTRAVENOUS ONCE
Refills: 0 | Status: DISCONTINUED | OUTPATIENT
Start: 2022-01-21 | End: 2022-01-25

## 2022-01-21 RX ORDER — INSULIN GLARGINE 100 [IU]/ML
60 INJECTION, SOLUTION SUBCUTANEOUS AT BEDTIME
Refills: 0 | Status: DISCONTINUED | OUTPATIENT
Start: 2022-01-21 | End: 2022-01-21

## 2022-01-21 RX ORDER — DEXTROSE 50 % IN WATER 50 %
25 SYRINGE (ML) INTRAVENOUS ONCE
Refills: 0 | Status: DISCONTINUED | OUTPATIENT
Start: 2022-01-21 | End: 2022-01-25

## 2022-01-21 RX ORDER — ONDANSETRON 8 MG/1
4 TABLET, FILM COATED ORAL EVERY 8 HOURS
Refills: 0 | Status: DISCONTINUED | OUTPATIENT
Start: 2022-01-21 | End: 2022-01-25

## 2022-01-21 RX ORDER — CLOPIDOGREL BISULFATE 75 MG/1
75 TABLET, FILM COATED ORAL DAILY
Refills: 0 | Status: DISCONTINUED | OUTPATIENT
Start: 2022-01-21 | End: 2022-01-25

## 2022-01-21 RX ORDER — HYDROMORPHONE HYDROCHLORIDE 2 MG/ML
0.5 INJECTION INTRAMUSCULAR; INTRAVENOUS; SUBCUTANEOUS
Refills: 0 | Status: DISCONTINUED | OUTPATIENT
Start: 2022-01-21 | End: 2022-01-21

## 2022-01-21 RX ORDER — GLUCAGON INJECTION, SOLUTION 0.5 MG/.1ML
1 INJECTION, SOLUTION SUBCUTANEOUS ONCE
Refills: 0 | Status: DISCONTINUED | OUTPATIENT
Start: 2022-01-21 | End: 2022-01-25

## 2022-01-21 RX ORDER — SACUBITRIL AND VALSARTAN 24; 26 MG/1; MG/1
1 TABLET, FILM COATED ORAL
Refills: 0 | Status: DISCONTINUED | OUTPATIENT
Start: 2022-01-21 | End: 2022-01-25

## 2022-01-21 RX ORDER — PANTOPRAZOLE SODIUM 20 MG/1
40 TABLET, DELAYED RELEASE ORAL
Refills: 0 | Status: DISCONTINUED | OUTPATIENT
Start: 2022-01-21 | End: 2022-01-25

## 2022-01-21 RX ORDER — SODIUM CHLORIDE 9 MG/ML
1000 INJECTION, SOLUTION INTRAVENOUS
Refills: 0 | Status: DISCONTINUED | OUTPATIENT
Start: 2022-01-21 | End: 2022-01-21

## 2022-01-21 RX ORDER — INSULIN LISPRO 100/ML
VIAL (ML) SUBCUTANEOUS
Refills: 0 | Status: DISCONTINUED | OUTPATIENT
Start: 2022-01-21 | End: 2022-01-25

## 2022-01-21 RX ORDER — AMPICILLIN SODIUM AND SULBACTAM SODIUM 250; 125 MG/ML; MG/ML
3 INJECTION, POWDER, FOR SUSPENSION INTRAMUSCULAR; INTRAVENOUS EVERY 8 HOURS
Refills: 0 | Status: DISCONTINUED | OUTPATIENT
Start: 2022-01-21 | End: 2022-01-25

## 2022-01-21 RX ORDER — ENOXAPARIN SODIUM 100 MG/ML
30 INJECTION SUBCUTANEOUS DAILY
Refills: 0 | Status: DISCONTINUED | OUTPATIENT
Start: 2022-01-21 | End: 2022-01-21

## 2022-01-21 RX ORDER — INSULIN GLARGINE 100 [IU]/ML
60 INJECTION, SOLUTION SUBCUTANEOUS AT BEDTIME
Refills: 0 | Status: DISCONTINUED | OUTPATIENT
Start: 2022-01-21 | End: 2022-01-22

## 2022-01-21 RX ORDER — LANOLIN ALCOHOL/MO/W.PET/CERES
3 CREAM (GRAM) TOPICAL AT BEDTIME
Refills: 0 | Status: DISCONTINUED | OUTPATIENT
Start: 2022-01-21 | End: 2022-01-25

## 2022-01-21 RX ORDER — TAMSULOSIN HYDROCHLORIDE 0.4 MG/1
0.4 CAPSULE ORAL AT BEDTIME
Refills: 0 | Status: DISCONTINUED | OUTPATIENT
Start: 2022-01-21 | End: 2022-01-25

## 2022-01-21 RX ORDER — ENOXAPARIN SODIUM 100 MG/ML
40 INJECTION SUBCUTANEOUS DAILY
Refills: 0 | Status: DISCONTINUED | OUTPATIENT
Start: 2022-01-22 | End: 2022-01-25

## 2022-01-21 RX ORDER — ATORVASTATIN CALCIUM 80 MG/1
80 TABLET, FILM COATED ORAL AT BEDTIME
Refills: 0 | Status: DISCONTINUED | OUTPATIENT
Start: 2022-01-21 | End: 2022-01-25

## 2022-01-21 RX ADMIN — Medication 11 UNIT(S): at 08:59

## 2022-01-21 RX ADMIN — Medication 2: at 17:31

## 2022-01-21 RX ADMIN — ENOXAPARIN SODIUM 30 MILLIGRAM(S): 100 INJECTION SUBCUTANEOUS at 14:42

## 2022-01-21 RX ADMIN — Medication 11 UNIT(S): at 17:31

## 2022-01-21 RX ADMIN — Medication 650 MILLIGRAM(S): at 09:01

## 2022-01-21 RX ADMIN — TAMSULOSIN HYDROCHLORIDE 0.4 MILLIGRAM(S): 0.4 CAPSULE ORAL at 22:03

## 2022-01-21 RX ADMIN — PANTOPRAZOLE SODIUM 40 MILLIGRAM(S): 20 TABLET, DELAYED RELEASE ORAL at 06:30

## 2022-01-21 RX ADMIN — CLOPIDOGREL BISULFATE 75 MILLIGRAM(S): 75 TABLET, FILM COATED ORAL at 14:42

## 2022-01-21 RX ADMIN — INSULIN GLARGINE 60 UNIT(S): 100 INJECTION, SOLUTION SUBCUTANEOUS at 22:02

## 2022-01-21 RX ADMIN — AMPICILLIN SODIUM AND SULBACTAM SODIUM 200 GRAM(S): 250; 125 INJECTION, POWDER, FOR SUSPENSION INTRAMUSCULAR; INTRAVENOUS at 06:30

## 2022-01-21 RX ADMIN — AMPICILLIN SODIUM AND SULBACTAM SODIUM 200 GRAM(S): 250; 125 INJECTION, POWDER, FOR SUSPENSION INTRAMUSCULAR; INTRAVENOUS at 22:01

## 2022-01-21 RX ADMIN — PANTOPRAZOLE SODIUM 40 MILLIGRAM(S): 20 TABLET, DELAYED RELEASE ORAL at 14:45

## 2022-01-21 RX ADMIN — Medication 1: at 08:58

## 2022-01-21 RX ADMIN — ATORVASTATIN CALCIUM 80 MILLIGRAM(S): 80 TABLET, FILM COATED ORAL at 22:03

## 2022-01-21 RX ADMIN — Medication 81 MILLIGRAM(S): at 14:42

## 2022-01-21 NOTE — PRE-ANESTHESIA EVALUATION ADULT - NSANTHINDVINFOSD_GEN_ALL_CORE
Medication: prednisoLONE acetate (PRED FORTE) 1 % ophthalmic suspension    Diagnosis: S/P PKP (penetrating keratoplasty  Requested directions: same  Current directions on the medication list: Place 1 drop Into the left eye daily     Last Written Prescription Date:  4/6/20  Last Fill Quantity: 5 ml,   # refills: 0    Last Office Visit: 5/30/18  Future Office visit: 10/13/20    Attending Provider:Lina Novak MD   Last Clinic Note: 5/30/18    Routing refill request to provider for review/approval because:    Requires provider review/ approval     patient

## 2022-01-21 NOTE — DISCHARGE NOTE PROVIDER - NSDCMRMEDTOKEN_GEN_ALL_CORE_FT
albuterol 90 mcg/inh inhalation aerosol: 2 puff(s) inhaled 2 times a day  Allegra 180 mg oral tablet: 1 tab(s) orally once a day  aspirin 81 mg oral tablet, chewable: 1 tab(s) orally once a day  clopidogrel 75 mg oral tablet: 1 tab(s) orally once a day  Entresto 49 mg-51 mg oral tablet: 1 tab(s) orally 2 times a day  furosemide 80 mg oral tablet: 1 tab(s) orally once a day  HumaLOG 100 units/mL subcutaneous solution: per sliding scale   indapamide 1.25 mg oral tablet: 1 tab(s) orally once a day (in the morning)  Lantus 100 units/mL subcutaneous solution: 60 unit(s) subcutaneous once a day (at bedtime)  metoprolol succinate 50 mg oral tablet, extended release: 1 tab(s) orally every 12 hours  pantoprazole 40 mg oral delayed release tablet: 1 tab(s) orally once a day  rosuvastatin 20 mg oral tablet: 1 tab(s) orally once a day  tamsulosin 0.4 mg oral capsule: 1 cap(s) orally once a day   albuterol 90 mcg/inh inhalation aerosol: 2 puff(s) inhaled 2 times a day  Allegra 180 mg oral tablet: 1 tab(s) orally once a day  aspirin 81 mg oral tablet, chewable: 1 tab(s) orally once a day  CAM Walker Boot Left foot : Please provide one CAM walker boot for left foot  to be used as indicated  s/p L foot partial 3rd ray resection   clopidogrel 75 mg oral tablet: 1 tab(s) orally once a day  Entresto 49 mg-51 mg oral tablet: 1 tab(s) orally 2 times a day  furosemide 80 mg oral tablet: 1 tab(s) orally once a day  HumaLOG 100 units/mL subcutaneous solution: per sliding scale   indapamide 1.25 mg oral tablet: 1 tab(s) orally once a day (in the morning)  Lantus 100 units/mL subcutaneous solution: 60 unit(s) subcutaneous once a day (at bedtime)  metoprolol succinate 50 mg oral tablet, extended release: 1 tab(s) orally every 12 hours  pantoprazole 40 mg oral delayed release tablet: 1 tab(s) orally once a day  rosuvastatin 20 mg oral tablet: 1 tab(s) orally once a day  tamsulosin 0.4 mg oral capsule: 1 cap(s) orally once a day   albuterol 90 mcg/inh inhalation aerosol: 2 puff(s) inhaled 2 times a day  Allegra 180 mg oral tablet: 1 tab(s) orally once a day  aspirin 81 mg oral tablet, chewable: 1 tab(s) orally once a day  CAM Walker Boot Left foot : Please provide one CAM walker boot for left foot  to be used as indicated  s/p L foot partial 3rd ray resection   clopidogrel 75 mg oral tablet: 1 tab(s) orally once a day  Entresto 49 mg-51 mg oral tablet: 1 tab(s) orally 2 times a day  HumaLOG 100 units/mL subcutaneous solution: per sliding scale   indapamide 1.25 mg oral tablet: 1 tab(s) orally once a day (in the morning)  Lantus 100 units/mL subcutaneous solution: 60 unit(s) subcutaneous once a day (at bedtime)  metoprolol succinate 50 mg oral tablet, extended release: 1 tab(s) orally every 12 hours  pantoprazole 40 mg oral delayed release tablet: 1 tab(s) orally once a day  rosuvastatin 20 mg oral tablet: 1 tab(s) orally once a day  tamsulosin 0.4 mg oral capsule: 1 cap(s) orally once a day   albuterol 90 mcg/inh inhalation aerosol: 2 puff(s) inhaled 2 times a day  Allegra 180 mg oral tablet: 1 tab(s) orally once a day  aspirin 81 mg oral tablet, chewable: 1 tab(s) orally once a day  CAM Walker Boot Left foot : Please provide one CAM walker boot for left foot  to be used as indicated  s/p L foot partial 3rd ray resection   clopidogrel 75 mg oral tablet: 1 tab(s) orally once a day  Entresto 49 mg-51 mg oral tablet: 1 tab(s) orally 2 times a day  HumaLOG 100 units/mL subcutaneous solution: per sliding scale   indapamide 1.25 mg oral tablet: 1 tab(s) orally once a day (in the morning)  Lantus 100 units/mL subcutaneous solution: 65 unit(s) subcutaneous once a day (at bedtime)  metoprolol succinate 50 mg oral tablet, extended release: 1 tab(s) orally every 12 hours  pantoprazole 40 mg oral delayed release tablet: 1 tab(s) orally once a day  rosuvastatin 20 mg oral tablet: 1 tab(s) orally once a day  tamsulosin 0.4 mg oral capsule: 1 cap(s) orally once a day   albuterol 90 mcg/inh inhalation aerosol: 2 puff(s) inhaled 2 times a day  Allegra 180 mg oral tablet: 1 tab(s) orally once a day  aspirin 81 mg oral tablet, chewable: 1 tab(s) orally once a day  Augmentin 500 mg-125 mg oral tablet: 1 tab(s) orally 2 times a day   CAM Walker Boot Left foot : Please provide one CAM walker boot for left foot  to be used as indicated  s/p L foot partial 3rd ray resection   clopidogrel 75 mg oral tablet: 1 tab(s) orally once a day  Entresto 49 mg-51 mg oral tablet: 1 tab(s) orally 2 times a day  furosemide 40 mg oral tablet: 1 tab(s) orally once a day  HumaLOG 100 units/mL subcutaneous solution: 10 unit(s) subcutaneous 3 times a day (before meals)  indapamide 1.25 mg oral tablet: 1 tab(s) orally once a day (in the morning)  Lantus 100 units/mL subcutaneous solution: 65 unit(s) subcutaneous once a day (at bedtime)  metoprolol succinate 50 mg oral tablet, extended release: 1 tab(s) orally every 12 hours  mupirocin 2% topical ointment: 1 application topically 2 times a day  pantoprazole 40 mg oral delayed release tablet: 1 tab(s) orally once a day  rosuvastatin 20 mg oral tablet: 1 tab(s) orally once a day  tamsulosin 0.4 mg oral capsule: 1 cap(s) orally once a day

## 2022-01-21 NOTE — PROGRESS NOTE ADULT - ASSESSMENT
Assessment  DMT2: 61y Male with DM T2 with hyperglycemia admitted with foot wound,  patient was on insulin at home, now on basal bolus insulin, blood sugars improving and in overall acceptable range, no hypoglycemias, for left foot partial resection today.  Foot wound: on medications, monitored, asymptomatic.  HTN: On antihypertensive medications, monitored, asymptomatic.  Overweight: No strict exercise routines, neither on low calorie diet.       Cortney Flanagan MD  Cell: [phone]  Office: [phone]       Assessment  DMT2: 61y Male with DM T2 with hyperglycemia admitted with foot wound,  patient was on insulin at home, now on basal bolus insulin, blood sugars improving and in overall acceptable range, no hypoglycemias,  for left foot partial resection today.  Foot wound: on medications, monitored, asymptomatic.  HTN: On antihypertensive medications, monitored, asymptomatic.  Overweight: No strict exercise routines, neither on low calorie diet.       Cortney Flanagan MD  Cell: [phone]  Office: [phone]

## 2022-01-21 NOTE — DISCHARGE NOTE PROVIDER - CARE PROVIDER_API CALL
Monty Looney (MD)  Cardiology; Internal Medicine  [address], [address]  [place] [address]  Phone: [phone]  Fax: [phone]  Follow Up Time: 1 week    Yann Cain (DO)  Nephrology  [address], [address]  [place] [address]  Phone: [phone]  Fax: [phone]  Follow Up Time: 1 week    Jeison Sigala (DPM)  Podiatric Medicine and Surgery  [address] [place] [address]  Phone: [phone]  Fax: [phone]  Follow Up Time: 1 week   Monty Looney (MD)  Cardiology; Internal Medicine  [address] [facility] [address], [address]  [place] [address]  Phone: [phone]  Fax: [phone]  Follow Up Time: 1 week    Yann Cain (DO)  Nephrology  [address] [facility] [address] [facility], [address]  [place] [address]  Phone: [phone]  Fax: [phone]  Follow Up Time: 1 week    Jeison Sigala (DPM)  Podiatric Medicine and Surgery  [address] [place] [address]  Phone: [phone]  Fax: [phone]  Follow Up Time: 1 week    RYAN KENNEDY  Internal Medicine  [address], [address]  [place] [address]  Phone: [phone]  Fax: [phone]  Follow Up Time: 1 week

## 2022-01-21 NOTE — PROGRESS NOTE ADULT - SUBJECTIVE AND OBJECTIVE BOX
SEVERIANO MARTINEZ  61y Male  MRN:[id number]    Patient is a 61y old  Male who presents with a chief complaint of foot ulcer (18 Jan 2022 12:11)    HPI:  61M with PMH of HTN, CAD/MI s/p stents, HFrEF (EF 15-20%) s/p AICD, T2DM on insulin p/w foot ulcer. Pt had bunionectomy of L foot approx 5 weeks ago with poor wound healing since that time. In the past 2 weeks had started noticing increasing pain, swelling and erythema at site of the wound and was started on levaquin in 1/4/21 x 10 days without any improvement in symptoms. Was started on clindamycin 2-3 days ago without any improvement. Pt states he has 5/10 intermittent sharp pain at site of the wound and initially had only localized swelling and erythema; however, in past 24 hours started noticing the swelling and erythema moving proximally, now with swelling to his knees and erythema to mid calf. Pain has also worsened but remains localized, non radiating but worse with weight bearing or ambulation; has been taking Tylenol or Advil for pain with some relief. Endorses minimal clear discharge from wound. Went to see podiatrist today who referred him to ED for further evaluation. Denies any fevers, +chills ongoing x weeks, +1 episode of nausea with vomiting this morning, no abd pain, no CP, SOB, no GI or  symptoms.  (17 Jan 2022 23:43)      Patient seen and evaluated at bedside. No acute events overnight except as noted.    Interval HPI: s/p OR this AM      PAST MEDICAL & SURGICAL HISTORY:  Stented coronary artery    Diabetes    AICD (automatic cardioverter/defibrillator) present    Hypertension    Heart failure with reduced ejection fraction    History of ischemic cardiomyopathy    History of COPD    H/O gastroesophageal reflux (GERD)    H/O vasectomy  20 yrs ago (2000)        REVIEW OF SYSTEMS:  as per hpi     VITALS:   Vital Signs Last 24 Hrs  T(C): 36.4 (21 Jan 2022 13:25), Max: 37.2 (20 Jan 2022 14:52)  T(F): 97.6 (21 Jan 2022 13:25), Max: 99 (20 Jan 2022 14:52)  HR: 99 (21 Jan 2022 13:42) (81 - 105)  BP: 94/61 (21 Jan 2022 13:42) (93/53 - 126/72)  BP(mean): 71 (21 Jan 2022 13:42) (70 - 79)  RR: 16 (21 Jan 2022 13:42) (15 - 19)  SpO2: 96% (21 Jan 2022 13:42) (92% - 97%)      PHYSICAL EXAM:  GENERAL: NAD, well-developed  HEAD:  Atraumatic, Normocephalic  EYES: EOMI, PERRLA, conjunctiva and sclera clear  NECK: Supple, No JVD  CHEST/LUNG: Clear to auscultation bilaterally; No wheeze  HEART: S1, S2; No murmurs, rubs, or gallops  ABDOMEN: Soft, Nontender, Nondistended; Bowel sounds present  EXTREMITIES:  2+ Peripheral Pulses, No clubbing, cyanosis, or edema. LLE dressing   PSYCH: Normal affect  NEUROLOGY: AAOX3; non-focal  SKIN: No rashes or lesions    Consultant(s) Notes Reviewed:  [x ] YES  [ ] NO  Care Discussed with Consultants/Other Providers [ x] YES  [ ] NO    MEDS:   MEDICATIONS  (STANDING):  aspirin  chewable 81 milliGRAM(s) Oral daily  atorvastatin 80 milliGRAM(s) Oral at bedtime  clopidogrel Tablet 75 milliGRAM(s) Oral daily  dextrose 40% Gel 15 Gram(s) Oral once  dextrose 5%. 1000 milliLiter(s) (50 mL/Hr) IV Continuous <Continuous>  dextrose 5%. 1000 milliLiter(s) (100 mL/Hr) IV Continuous <Continuous>  dextrose 50% Injectable 25 Gram(s) IV Push once  dextrose 50% Injectable 12.5 Gram(s) IV Push once  dextrose 50% Injectable 25 Gram(s) IV Push once  enoxaparin Injectable 30 milliGRAM(s) SubCutaneous daily  glucagon  Injectable 1 milliGRAM(s) IntraMuscular once  influenza   Vaccine 0.5 milliLiter(s) IntraMuscular once  insulin glargine Injectable (LANTUS) 60 Unit(s) SubCutaneous at bedtime  insulin lispro (ADMELOG) corrective regimen sliding scale   SubCutaneous three times a day before meals  insulin lispro (ADMELOG) corrective regimen sliding scale   SubCutaneous at bedtime  insulin lispro Injectable (ADMELOG) 11 Unit(s) SubCutaneous three times a day before meals  lactated ringers. 1000 milliLiter(s) (75 mL/Hr) IV Continuous <Continuous>  mupirocin 2% Ointment 1 Application(s) Topical two times a day  pantoprazole    Tablet 40 milliGRAM(s) Oral before breakfast  tamsulosin 0.4 milliGRAM(s) Oral at bedtime    MEDICATIONS  (PRN):  acetaminophen     Tablet .. 650 milliGRAM(s) Oral every 6 hours PRN Temp greater or equal to 38C (100.4F), Mild Pain (1 - 3)  ALPRAZolam 0.25 milliGRAM(s) Oral two times a day PRN anxiety  aluminum hydroxide/magnesium hydroxide/simethicone Suspension 30 milliLiter(s) Oral every 4 hours PRN Dyspepsia  HYDROmorphone  Injectable 0.5 milliGRAM(s) IV Push every 10 minutes PRN Moderate Pain (4 - 6)  melatonin 3 milliGRAM(s) Oral at bedtime PRN Insomnia  ondansetron Injectable 4 milliGRAM(s) IV Push every 8 hours PRN Nausea and/or Vomiting        ALLERGIES:  No Known Allergies      LABS:                                                                         10.7   9.16  )-----------( 244      ( 21 Jan 2022 07:00 )             34.2   01-21    133<L>  |  94<L>  |  62<H>  ----------------------------<  171<H>  3.5   |  22  |  1.90<H>    Ca    9.0      21 Jan 2022 07:00          < from: MR Foot w/wo IV Cont, Left (01.19.22 @ 17:06) >  IMPRESSION:    Plantar wound at the level of the third metatarsal head with   osteomyelitis within the metatarsal head as well as in the proximal   phalanx of the third digit.    --- End of Report ---    < end of copied text >     < from: [facility] Duplex Lower Ext Vein Scan, Left (01.17.22 @ 20:05) >    IMPRESSION:  No evidence of left lower extremity deep venous thrombosis.      < end of copied text >  < from: Xray Tibia + Fibula 2 Views, Left (01.17.22 @ 15:46) >  IMPRESSION:  Soft tissue swelling about the ankle and proximal foot, without   subcutaneous emphysema or radiographic evidence of necrotizing fasciitis.    No acute fracture or dislocation.    < end of copied text >

## 2022-01-21 NOTE — DISCHARGE NOTE PROVIDER - CARE PROVIDERS DIRECT ADDRESSES
,[email or web address],[email or web address],[email or web address],DirectAddress_Unknown

## 2022-01-21 NOTE — DISCHARGE NOTE PROVIDER - PROVIDER TOKENS
PROVIDER:[TOKEN:[[id number]],FOLLOWUP:[1 week]],PROVIDER:[TOKEN:[[id number]],FOLLOWUP:[1 week]],PROVIDER:[TOKEN:[[id number]],FOLLOWUP:[1 week]] PROVIDER:[TOKEN:[[id number]],FOLLOWUP:[1 week]],PROVIDER:[TOKEN:[[id number]],FOLLOWUP:[1 week]],PROVIDER:[TOKEN:[[id number]],FOLLOWUP:[1 week]],PROVIDER:[TOKEN:[[id number]],FOLLOWUP:[1 week]]

## 2022-01-21 NOTE — BRIEF OPERATIVE NOTE - OPERATION/FINDINGS
s/p left foot partial 3rd ray resection- closed, good intraop bleeding, good bone quality at the level of resection, low concern for residual infection, low concern for viability

## 2022-01-21 NOTE — PROGRESS NOTE ADULT - SUBJECTIVE AND OBJECTIVE BOX
[facility]   [phone]  RENAL FOLLOW UP NOTE  --------------------------------------------------------------------------------  Chief Complaint:    24 hour events/subjective:    seen earlier   s/p OR   states has no pain   no short of breath    PAST HISTORY  --------------------------------------------------------------------------------  No significant changes to PMH, PSH, FHx, SHx, unless otherwise noted    ALLERGIES & MEDICATIONS  --------------------------------------------------------------------------------  Allergies    No Known Allergies    Intolerances      Standing Inpatient Medications  ampicillin/sulbactam  IVPB 3 Gram(s) IV Intermittent every 8 hours  aspirin  chewable 81 milliGRAM(s) Oral daily  atorvastatin 80 milliGRAM(s) Oral at bedtime  clopidogrel Tablet 75 milliGRAM(s) Oral daily  dextrose 40% Gel 15 Gram(s) Oral once  dextrose 5%. 1000 milliLiter(s) IV Continuous <Continuous>  dextrose 5%. 1000 milliLiter(s) IV Continuous <Continuous>  dextrose 50% Injectable 25 Gram(s) IV Push once  dextrose 50% Injectable 12.5 Gram(s) IV Push once  dextrose 50% Injectable 25 Gram(s) IV Push once  glucagon  Injectable 1 milliGRAM(s) IntraMuscular once  influenza   Vaccine 0.5 milliLiter(s) IntraMuscular once  insulin glargine Injectable (LANTUS) 60 Unit(s) SubCutaneous at bedtime  insulin lispro (ADMELOG) corrective regimen sliding scale   SubCutaneous three times a day before meals  insulin lispro (ADMELOG) corrective regimen sliding scale   SubCutaneous at bedtime  insulin lispro Injectable (ADMELOG) 11 Unit(s) SubCutaneous three times a day before meals  mupirocin 2% Ointment 1 Application(s) Topical two times a day  pantoprazole    Tablet 40 milliGRAM(s) Oral before breakfast  tamsulosin 0.4 milliGRAM(s) Oral at bedtime    PRN Inpatient Medications  acetaminophen     Tablet .. 650 milliGRAM(s) Oral every 6 hours PRN  ALPRAZolam 0.25 milliGRAM(s) Oral two times a day PRN  aluminum hydroxide/magnesium hydroxide/simethicone Suspension 30 milliLiter(s) Oral every 4 hours PRN  melatonin 3 milliGRAM(s) Oral at bedtime PRN  ondansetron Injectable 4 milliGRAM(s) IV Push every 8 hours PRN      REVIEW OF SYSTEMS  --------------------------------------------------------------------------------    Gen: denies , fevers/chills,  CVS: denies chest pain/palpitations  Resp: denies SOB/Cough  GI: Denies N/V/Abd pain  : Denies dysuria/oliguria/hematuria        VITALS/PHYSICAL EXAM  --------------------------------------------------------------------------------  T(C): 36.9 (01-21-22 @ 20:55), Max: 37 (01-21-22 @ 04:37)  HR: 101 (01-21-22 @ 20:55) (81 - 105)  BP: 110/74 (01-21-22 @ 20:55) (94/61 - 126/72)  RR: 18 (01-21-22 @ 20:55) (15 - 19)  SpO2: 97% (01-21-22 @ 20:55) (92% - 97%)  Wt(kg): --  Height (cm): 195.6 (01-21-22 @ 10:15)  Weight (kg): 127.9 (01-21-22 @ 10:15)  BMI (kg/m2): 33.4 (01-21-22 @ 10:15)  BSA (m2): 2.59 (01-21-22 @ 10:15)      01-20-22 @ 07:01  -  01-21-22 @ 07:00  --------------------------------------------------------  IN: 900 mL / OUT: 0 mL / NET: 900 mL    01-21-22 @ 07:01  -  01-21-22 @ 22:14  --------------------------------------------------------  IN: 535 mL / OUT: 200 mL / NET: 335 mL      Physical Exam:  	  Gen: Non toxic comfortable appearing   	no jvd  	Pulm: decrease bs  no rales or ronchi or wheezing  	CV: RRR, S1S2; no rub  	Back: No CVA tenderness  	Abd: +BS, soft, nontender/nondistended  	: No suprapubic tenderness  	UE: Warm, no cyanosis  no clubbing,  trace RLE  edema  	LE: Warm, left leg edema 2- ; rle no edema left foot dressing   	Neuro: alert and oriented. speech coherent         LABS/STUDIES  --------------------------------------------------------------------------------              10.7   9.16  >-----------<  244      [01-21-22 @ 07:00]              34.2     133  |  94  |  62  ----------------------------<  171      [01-21-22 @ 07:00]  3.5   |  22  |  1.90        Ca     9.0     [01-21-22 @ 07:00]      PT/INR: PT 15.4 , INR 1.30       [01-21-22 @ 07:00]  PTT: 33.0       [01-21-22 @ 07:00]      Creatinine Trend:  SCr 1.90 [01-21 @ 07:00]  SCr 2.07 [01-20 @ 06:52]  SCr 2.57 [01-19 @ 17:20]  SCr 2.92 [01-19 @ 07:16]  SCr 2.49 [01-17 @ 15:04]              Urinalysis - [01-19-22 @ 06:25]      Color Light Yellow / Appearance Clear / SG 1.014 / pH 5.5      Gluc Negative / Ketone Negative  / Bili Negative / Urobili Negative       Blood Negative / Protein 30 mg/dL / Leuk Est Negative / Nitrite Negative      RBC 2 / WBC 1 / Hyaline 4 / Gran  / Sq Epi  / Non Sq Epi 0 / Bacteria Negative    Urine Creatinine 137      [01-19-22 @ 06:25]  Urine Protein 26      [01-19-22 @ 06:25]    HbA1c 8.9      [12-16-19 @ 08:15]

## 2022-01-21 NOTE — PROGRESS NOTE ADULT - SUBJECTIVE AND OBJECTIVE BOX
Chief complaint  Patient is a 61y old  Male who presents with a chief complaint of foot ulcer (21 Jan 2022 14:08)   Review of systems  Patient in bed, looks comfortable, no hypoglycemic episodes.    Labs and Fingersticks  CAPILLARY BLOOD GLUCOSE      POCT Blood Glucose.: 179 mg/dL (21 Jan 2022 12:16)  POCT Blood Glucose.: 195 mg/dL (21 Jan 2022 08:56)  POCT Blood Glucose.: 166 mg/dL (20 Jan 2022 23:35)  POCT Blood Glucose.: 152 mg/dL (20 Jan 2022 21:33)  POCT Blood Glucose.: 179 mg/dL (20 Jan 2022 17:58)      Anion Gap, Serum: 17 (01-21 @ 07:00)  Anion Gap, Serum: 17 (01-20 @ 06:52)  Anion Gap, Serum: 17 (01-19 @ 17:20)      Calcium, Total Serum: 9.0 (01-21 @ 07:00)  Calcium, Total Serum: 9.3 (01-20 @ 06:52)  Calcium, Total Serum: 9.1 (01-19 @ 17:20)          01-21    133<L>  |  94<L>  |  62<H>  ----------------------------<  171<H>  3.5   |  22  |  1.90<H>    Ca    9.0      21 Jan 2022 07:00                          10.7   9.16  )-----------( 244      ( 21 Jan 2022 07:00 )             34.2     Medications  MEDICATIONS  (STANDING):  aspirin  chewable 81 milliGRAM(s) Oral daily  atorvastatin 80 milliGRAM(s) Oral at bedtime  clopidogrel Tablet 75 milliGRAM(s) Oral daily  dextrose 40% Gel 15 Gram(s) Oral once  dextrose 5%. 1000 milliLiter(s) (50 mL/Hr) IV Continuous <Continuous>  dextrose 5%. 1000 milliLiter(s) (100 mL/Hr) IV Continuous <Continuous>  dextrose 50% Injectable 25 Gram(s) IV Push once  dextrose 50% Injectable 12.5 Gram(s) IV Push once  dextrose 50% Injectable 25 Gram(s) IV Push once  enoxaparin Injectable 30 milliGRAM(s) SubCutaneous daily  glucagon  Injectable 1 milliGRAM(s) IntraMuscular once  influenza   Vaccine 0.5 milliLiter(s) IntraMuscular once  insulin glargine Injectable (LANTUS) 60 Unit(s) SubCutaneous at bedtime  insulin lispro (ADMELOG) corrective regimen sliding scale   SubCutaneous three times a day before meals  insulin lispro (ADMELOG) corrective regimen sliding scale   SubCutaneous at bedtime  insulin lispro Injectable (ADMELOG) 11 Unit(s) SubCutaneous three times a day before meals  mupirocin 2% Ointment 1 Application(s) Topical two times a day  pantoprazole    Tablet 40 milliGRAM(s) Oral before breakfast  tamsulosin 0.4 milliGRAM(s) Oral at bedtime      Physical Exam  General: Patient comfortable in bed  Vital Signs Last 12 Hrs  T(F): 97.6 (01-21-22 @ 13:25), Max: 98.6 (01-21-22 @ 04:37)  HR: 99 (01-21-22 @ 13:42) (81 - 105)  BP: 94/61 (01-21-22 @ 13:42) (94/61 - 126/72)  BP(mean): 71 (01-21-22 @ 13:42) (70 - 79)  RR: 16 (01-21-22 @ 13:42) (15 - 19)  SpO2: 96% (01-21-22 @ 13:42) (92% - 97%)  Neck: No palpable thyroid nodules.  CVS: S1S2, No murmurs  Respiratory: No wheezing, no crepitations  GI: Abdomen soft, bowel sounds positive  Musculoskeletal:  edema lower extremities.   Skin: No skin rashes, no ecchymosis    Diagnostics             Chief complaint  Patient is a 61y old  Male who presents with a chief complaint of foot ulcer (21 Jan 2022 14:08)   Review of systems  Patient in bed, looks comfortable, no hypoglycemic episodes.    Labs and Fingersticks  CAPILLARY BLOOD GLUCOSE      POCT Blood Glucose.: 179 mg/dL (21 Jan 2022 12:16)  POCT Blood Glucose.: 195 mg/dL (21 Jan 2022 08:56)  POCT Blood Glucose.: 166 mg/dL (20 Jan 2022 23:35)  POCT Blood Glucose.: 152 mg/dL (20 Jan 2022 21:33)  POCT Blood Glucose.: 179 mg/dL (20 Jan 2022 17:58)      Anion Gap, Serum: 17 (01-21 @ 07:00)  Anion Gap, Serum: 17 (01-20 @ 06:52)  Anion Gap, Serum: 17 (01-19 @ 17:20)      Calcium, Total Serum: 9.0 (01-21 @ 07:00)  Calcium, Total Serum: 9.3 (01-20 @ 06:52)  Calcium, Total Serum: 9.1 (01-19 @ 17:20)          01-21    133<L>  |  94<L>  |  62<H>  ----------------------------<  171<H>  3.5   |  22  |  1.90<H>    Ca    9.0      21 Jan 2022 07:00                          10.7   9.16  )-----------( 244      ( 21 Jan 2022 07:00 )             34.2     Medications  MEDICATIONS  (STANDING):  aspirin  chewable 81 milliGRAM(s) Oral daily  atorvastatin 80 milliGRAM(s) Oral at bedtime  clopidogrel Tablet 75 milliGRAM(s) Oral daily  dextrose 40% Gel 15 Gram(s) Oral once  dextrose 5%. 1000 milliLiter(s) (50 mL/Hr) IV Continuous <Continuous>  dextrose 5%. 1000 milliLiter(s) (100 mL/Hr) IV Continuous <Continuous>  dextrose 50% Injectable 25 Gram(s) IV Push once  dextrose 50% Injectable 12.5 Gram(s) IV Push once  dextrose 50% Injectable 25 Gram(s) IV Push once  enoxaparin Injectable 30 milliGRAM(s) SubCutaneous daily  glucagon  Injectable 1 milliGRAM(s) IntraMuscular once  influenza   Vaccine 0.5 milliLiter(s) IntraMuscular once  insulin glargine Injectable (LANTUS) 60 Unit(s) SubCutaneous at bedtime  insulin lispro (ADMELOG) corrective regimen sliding scale   SubCutaneous three times a day before meals  insulin lispro (ADMELOG) corrective regimen sliding scale   SubCutaneous at bedtime  insulin lispro Injectable (ADMELOG) 11 Unit(s) SubCutaneous three times a day before meals  mupirocin 2% Ointment 1 Application(s) Topical two times a day  pantoprazole    Tablet 40 milliGRAM(s) Oral before breakfast  tamsulosin 0.4 milliGRAM(s) Oral at bedtime      Physical Exam  General: Patient comfortable in bed  Vital Signs Last 12 Hrs  T(F): 97.6 (01-21-22 @ 13:25), Max: 98.6 (01-21-22 @ 04:37)  HR: 99 (01-21-22 @ 13:42) (81 - 105)  BP: 94/61 (01-21-22 @ 13:42) (94/61 - 126/72)  BP(mean): 71 (01-21-22 @ 13:42) (70 - 79)  RR: 16 (01-21-22 @ 13:42) (15 - 19)  SpO2: 96% (01-21-22 @ 13:42) (92% - 97%)  Neck: No palpable thyroid nodules.  CVS: S1S2, No murmurs  Respiratory: No wheezing, no crepitations  GI: Abdomen soft, bowel sounds positive  Musculoskeletal:  edema lower extremities.   Skin: No skin rashes, no ecchymosis    Diagnostics

## 2022-01-21 NOTE — DISCHARGE NOTE PROVIDER - NSDCCPCAREPLAN_GEN_ALL_CORE_FT
PRINCIPAL DISCHARGE DIAGNOSIS  Diagnosis: Cellulitis of left foot  Assessment and Plan of Treatment: Patient was treated with IV antibiotics and underwent left foot partial 3rd ray resection Friday 1/21. Bone culture preliminary with no growth. Please take all of your antibiotics as ordered.  Call your Health Care Provider within two days of arriving home to make a follow up appointment within one week.  If the affected area increases in redness, warmth, pain or swelling call your Health Care Provider.  If you develop fever, chills, and/or malaise, call your Health Care Provider.  Follow up with podiatry within 1 week from discharge.      SECONDARY DISCHARGE DIAGNOSES  Diagnosis: Chronic systolic heart failure  Assessment and Plan of Treatment: Take all medications as prescribed.  Weigh yourself daily.  If you gain 3lbs in 3 days, or 5lbs in a week call your Health Care Provider.  Eat a low sodium diet.  If you have pulmonary hypertension and you are a female of child bearing age, talk to your caregiver about using birth control pills or getting pregnant.  Call your Health Care Provider if you have any swelling or increased swelling in your feet, ankles, and/or stomach.  If you experience dizziness, chest pain, or shortness of breath, seek immediate medical attention.      Diagnosis: HTN (hypertension)  Assessment and Plan of Treatment: Continue to follow a low salt/sodium diet.  Perform physical activities as tolerated in consultation with your Primary Care Provider and physical therapist.  Take all medications as prescribed.  Follow up with your medical doctor for routine blood pressure monitoring at your next visit.  Notify your doctor if you have any of the following symptoms:  Dizziness, lightheadedness, blurry vision, headache, chest pain, or shortness of breath.      Diagnosis: CAD (coronary artery disease)  Assessment and Plan of Treatment: Coronary artery disease is a condition where the arteries the supply the heart muscle get clogged with fatty deposits & puts you at risk for a heart attack.  Call your doctor if you have any new pain, pressure, or discomfort in the center of your chest, pain, tingling or discomfort in arms, back, neck, jaw, or stomach, shortness of breath, nausea, vomiting, burping or heartburn, sweating, cold and clammy skin, racing or abnormal heartbeat for more than 10 minutes or if they keep coming & going.  Call 911 and do not try to get to hospital by car.  You can help yourself with lifestyle changes (quitting smoking, If you are on medication to help you quit smoking, be sure to take it as prescribed. Find healthy ways to deal with stress, such as exercise (check with your healthcare provider first), deep breathing, meditation, or enjoyable healthy hobbies.  Avoid situations that may cause you to smoke a cigarette.  Look for help with quitting; you are not alone. A resource to help you stop smoking is the [facility] – phone number 890-418-3617.), eat lots of fruits & vegetables & low fat dairy products, not a lot of meat & fatty foods, walk or some form of physical activity most days of the week, lose weight if you are overweight.  Take your cardiac medication as prescribed to lower cholesterol, to lower blood pressure, and control your blood sugar.      Diagnosis: Type 2 diabetes mellitus treated with insulin  Assessment and Plan of Treatment: Make sure you get your HgA1c checked every three months.  If you take oral diabetes medications, check your blood glucose at least two times a day.  If you take short-acting insulin, check your blood glucose before meals and at bedtime.  It's important not to skip any meals.  Keep a log of your blood glucose results and always take it with you to your doctor appointments.  Keep a list of your current medications including over the counter medications and bring this medication list with you to all your doctor appointments.  If you have not seen your ophthalmologist this year, call for appointment.  Check your feet daily for redness, sores, or openings.  Do not self treat.  If there is no improvement in two days, call your primary care physician for an appointment.  HgA1c this admission was 14.0    Diagnosis: MANJIT (acute kidney injury)  Assessment and Plan of Treatment: Please follow up with nephrology and PCP within 1 week from discharge.  Avoid taking NSAIDs (ex: Ibuprofen, Advil, Celebrex, Naprosyn) and other agents that can harm the kidneys such as intravenous contrast for diagnostic testing, combination cold medications, etc. until you are instructed to do so by your Primary Care Physician.  Have all of your medications adjusted for your renal function by your Health Care Provider.  Blood pressure control is important.  Take all medication as prescribed.  Do not overconsume foods that are high in potassium, such as bananas, until you are instructed to do so by your primary care physician.

## 2022-01-21 NOTE — PROGRESS NOTE ADULT - ASSESSMENT
61M with PMH of HTN, CAD/MI s/p stents, HFrEF (EF 15-20%) s/p AICD, T2DM on insulin p/w foot ulcer. he has MANJIT on ckd III with baseline cr known to be 1.5 in 2020. he also has uncontrolled DM       1- MANJIT on ckd   2- cellulitis foot   3- DM   4- HTN   5- CHF/HFrEF  6- cardiomyopathy       manjit in setting of infection  cr is improving   as of am restart lasix  40 mg and entresto 49/51 mg bid   strict I/O  iv vanco1 g qd  cefepime 1 g qd

## 2022-01-21 NOTE — PROGRESS NOTE ADULT - SUBJECTIVE AND OBJECTIVE BOX
SEVERIANO MARTINEZ 61y [id number]    Patient is a 61y old  Male who presents with a chief complaint of foot ulcer (21 Jan 2022 15:15)      Follow Up/CC:  ID following for toe OM    Interval History/ROS: no fever, s/p toe amputation     Allergies    No Known Allergies    Intolerances        ANTIMICROBIALS:      MEDICATIONS  (STANDING):  aspirin  chewable 81 milliGRAM(s) Oral daily  atorvastatin 80 milliGRAM(s) Oral at bedtime  clopidogrel Tablet 75 milliGRAM(s) Oral daily  dextrose 40% Gel 15 Gram(s) Oral once  dextrose 5%. 1000 milliLiter(s) (50 mL/Hr) IV Continuous <Continuous>  dextrose 5%. 1000 milliLiter(s) (100 mL/Hr) IV Continuous <Continuous>  dextrose 50% Injectable 25 Gram(s) IV Push once  dextrose 50% Injectable 12.5 Gram(s) IV Push once  dextrose 50% Injectable 25 Gram(s) IV Push once  enoxaparin Injectable 30 milliGRAM(s) SubCutaneous daily  glucagon  Injectable 1 milliGRAM(s) IntraMuscular once  influenza   Vaccine 0.5 milliLiter(s) IntraMuscular once  insulin glargine Injectable (LANTUS) 60 Unit(s) SubCutaneous at bedtime  insulin lispro (ADMELOG) corrective regimen sliding scale   SubCutaneous three times a day before meals  insulin lispro (ADMELOG) corrective regimen sliding scale   SubCutaneous at bedtime  insulin lispro Injectable (ADMELOG) 11 Unit(s) SubCutaneous three times a day before meals  mupirocin 2% Ointment 1 Application(s) Topical two times a day  pantoprazole    Tablet 40 milliGRAM(s) Oral before breakfast  tamsulosin 0.4 milliGRAM(s) Oral at bedtime    MEDICATIONS  (PRN):  acetaminophen     Tablet .. 650 milliGRAM(s) Oral every 6 hours PRN Temp greater or equal to 38C (100.4F), Mild Pain (1 - 3)  ALPRAZolam 0.25 milliGRAM(s) Oral two times a day PRN anxiety  aluminum hydroxide/magnesium hydroxide/simethicone Suspension 30 milliLiter(s) Oral every 4 hours PRN Dyspepsia  melatonin 3 milliGRAM(s) Oral at bedtime PRN Insomnia  ondansetron Injectable 4 milliGRAM(s) IV Push every 8 hours PRN Nausea and/or Vomiting        Vital Signs Last 24 Hrs  T(C): 36.4 (21 Jan 2022 13:25), Max: 37 (20 Jan 2022 21:08)  T(F): 97.6 (21 Jan 2022 13:25), Max: 98.6 (20 Jan 2022 21:08)  HR: 99 (21 Jan 2022 13:42) (81 - 105)  BP: 94/61 (21 Jan 2022 13:42) (94/61 - 126/72)  BP(mean): 71 (21 Jan 2022 13:42) (70 - 79)  RR: 16 (21 Jan 2022 13:42) (15 - 19)  SpO2: 96% (21 Jan 2022 13:42) (92% - 97%)    CBC Full  -  ( 21 Jan 2022 07:00 )  WBC Count : 9.16 K/uL  RBC Count : 4.50 M/uL  Hemoglobin : 10.7 g/dL  Hematocrit : 34.2 %  Platelet Count - Automated : 244 K/uL  Mean Cell Volume : 76.0 fl  Mean Cell Hemoglobin : 23.8 pg  Mean Cell Hemoglobin Concentration : 31.3 gm/dL  Auto Neutrophil # : x  Auto Lymphocyte # : x  Auto Monocyte # : x  Auto Eosinophil # : x  Auto Basophil # : x  Auto Neutrophil % : x  Auto Lymphocyte % : x  Auto Monocyte % : x  Auto Eosinophil % : x  Auto Basophil % : x    01-21    133<L>  |  94<L>  |  62<H>  ----------------------------<  171<H>  3.5   |  22  |  1.90<H>    Ca    9.0      21 Jan 2022 07:00            MICROBIOLOGY:  .Abscess left foot wound culture  01-17-22   Numerous Alpha hemolytic strep "Susceptibilities not performed"  Rare Enterococcus faecalis  Few Bacteroides thetaiotamcron group "Susceptibilities not performed"  --  Enterococcus faecalis      .Blood Blood-Peripheral  01-17-22   No growth to date.  --  --      RADIOLOGY    < from: Xray Foot AP + Lateral + Oblique, Left (01.21.22 @ 12:53) >  IMPRESSION: There is an interval resection at the neck of the third   metatarsal. The osseous margin is sharp. Soft tissue swelling and air is   consistent with recent operative change. There are tiny dorsal and   plantar calcaneal spurs.    < end of copied text >

## 2022-01-21 NOTE — BRIEF OPERATIVE NOTE - COMMENTS
s/p left foot partial 3rd ray resection- closed, good intraop bleeding, good bone quality at the level of resection, low concern for residual infection, low concern for viability, discharge pending clean bone culture no growth x 2 days

## 2022-01-21 NOTE — DISCHARGE NOTE PROVIDER - NSDCFUADDAPPT_GEN_ALL_CORE_FT
APPTS ARE READY TO BE MADE: [ ] YES    Best Family or Patient Contact (if needed):    Additional Information about above appointments (if needed):    1:   2:   3:     Other comments or requests:    APPTS ARE READY TO BE MADE: [x] YES    Best Family or Patient Contact (if needed):    Additional Information about above appointments (if needed):    1:   2:   3:     Other comments or requests:   Follow-up with your primary care physician within 1 week. Call for appointment.  Please bring all discharge paperwork and list of medications to all follow up appointments  Please call for follow up appointments one day after discharge

## 2022-01-21 NOTE — BRIEF OPERATIVE NOTE - SPECIMENS
Micro 1 - left foot deep wound culture, Micro 2 - left foot clean bone margin, Path 1 - left foot dirty soft tissue and bone, Path 2- left foot clean bone margin

## 2022-01-21 NOTE — PROGRESS NOTE ADULT - SUBJECTIVE AND OBJECTIVE BOX
Podiatry Pager #: [phone]    Patient is a 61y old  Male who presents with a chief complaint of foot ulcer (20 Jan 2022 22:23)      INTERVAL HPI/OVERNIGHT EVENTS:   Pt is scheduled for left foot partial third ray resection with Dr. Sigala at 10:30am. Patient is aware of procedure and is NPO since midnight.    MEDICATIONS  (STANDING):  ampicillin/sulbactam  IVPB 3 Gram(s) IV Intermittent every 8 hours  aspirin  chewable 81 milliGRAM(s) Oral daily  atorvastatin 80 milliGRAM(s) Oral at bedtime  clopidogrel Tablet 75 milliGRAM(s) Oral daily  dextrose 40% Gel 15 Gram(s) Oral once  dextrose 5%. 1000 milliLiter(s) (50 mL/Hr) IV Continuous <Continuous>  dextrose 5%. 1000 milliLiter(s) (100 mL/Hr) IV Continuous <Continuous>  dextrose 50% Injectable 25 Gram(s) IV Push once  dextrose 50% Injectable 12.5 Gram(s) IV Push once  dextrose 50% Injectable 25 Gram(s) IV Push once  enoxaparin Injectable 30 milliGRAM(s) SubCutaneous daily  glucagon  Injectable 1 milliGRAM(s) IntraMuscular once  influenza   Vaccine 0.5 milliLiter(s) IntraMuscular once  insulin glargine Injectable (LANTUS) 60 Unit(s) SubCutaneous at bedtime  insulin lispro (ADMELOG) corrective regimen sliding scale   SubCutaneous three times a day before meals  insulin lispro (ADMELOG) corrective regimen sliding scale   SubCutaneous at bedtime  insulin lispro Injectable (ADMELOG) 11 Unit(s) SubCutaneous three times a day before meals  mupirocin 2% Ointment 1 Application(s) Topical two times a day  pantoprazole    Tablet 40 milliGRAM(s) Oral before breakfast  tamsulosin 0.4 milliGRAM(s) Oral at bedtime    MEDICATIONS  (PRN):  acetaminophen     Tablet .. 650 milliGRAM(s) Oral every 6 hours PRN Temp greater or equal to 38C (100.4F), Mild Pain (1 - 3)  ALPRAZolam 0.25 milliGRAM(s) Oral two times a day PRN anxiety  aluminum hydroxide/magnesium hydroxide/simethicone Suspension 30 milliLiter(s) Oral every 4 hours PRN Dyspepsia  melatonin 3 milliGRAM(s) Oral at bedtime PRN Insomnia  ondansetron Injectable 4 milliGRAM(s) IV Push every 8 hours PRN Nausea and/or Vomiting      Allergies    No Known Allergies    Intolerances        Vital Signs Last 24 Hrs  T(C): 37 (21 Jan 2022 04:37), Max: 37.2 (20 Jan 2022 14:52)  T(F): 98.6 (21 Jan 2022 04:37), Max: 99 (20 Jan 2022 14:52)  HR: 105 (21 Jan 2022 04:37) (91 - 105)  BP: 100/67 (21 Jan 2022 04:37) (93/53 - 104/68)  BP(mean): --  RR: 18 (21 Jan 2022 04:37) (18 - 18)  SpO2: 94% (21 Jan 2022 04:37) (93% - 97%)    LABS:                        11.0   10.55 )-----------( 230      ( 20 Jan 2022 06:52 )             34.2     01-20    133<L>  |  94<L>  |  72<H>  ----------------------------<  203<H>  3.4<L>   |  22  |  2.07<H>    Ca    9.3      20 Jan 2022 06:52      PT/INR - ( 20 Jan 2022 06:55 )   PT: 15.5 sec;   INR: 1.31 ratio             CAPILLARY BLOOD GLUCOSE      POCT Blood Glucose.: 166 mg/dL (20 Jan 2022 23:35)  POCT Blood Glucose.: 152 mg/dL (20 Jan 2022 21:33)  POCT Blood Glucose.: 179 mg/dL (20 Jan 2022 17:58)  POCT Blood Glucose.: 167 mg/dL (20 Jan 2022 12:21)  POCT Blood Glucose.: 223 mg/dL (20 Jan 2022 08:39)      RADIOLOGY & ADDITIONAL TESTS:    Plan:   To OR today for left foot partial third ray resection with Dr. Sigala at 10:30am.  CXR on sunrise.  EKG on sunrise.  Medical/Cardiac clearance since 1/20 and documented in chart.  Consent signed and in chart.  Procedure was explained to patient in detail. All alternatives, risks and complications were discussed. All questions answered.

## 2022-01-21 NOTE — DISCHARGE NOTE PROVIDER - HOSPITAL COURSE
61M with PMH of HTN, CAD/MI s/p stents, HFrEF (EF 15-20%) s/p AICD, T2DM on insulin p/w non healing foot ulcer after bunionectomy 5weeks ago, now with acutely worsening pain, swelling and erythema, a/w sepsis 2/2 cellulitis and r/o OM. 61M with PMH of HTN, CAD/MI s/p stents, HFrEF (EF 15-20%) s/p AICD, T2DM on insulin p/w non healing foot ulcer after bunionectomy 5weeks ago, now with acutely worsening pain, swelling and erythema, a/w sepsis 2/2 cellulitis and r/o OM. Patient was admitted for further medical management Podiatry and ID wertr consulted for left foot submet 2 wound and LLE cellulitis. MRI showed  Plantar wound at the level of the third metatarsal head with osteomyelitis within the metatarsal head as well as in the proximal phalanx of the third digit. Patient was treated with IV abx and underwent L foot partial 3rd ray resection Friday 1/21. Bone culture preliminary w/ no growth. stable for discharge from pod standpoint pending final ID recs for abx, would recommend PO abx for soft tissue coveragestable for discharge from pod standpoint pending final ID recs for abx, would recommend PO abx for soft tissue coverage  Nephrology was consulted in setting of MANJIT recommending likely in setting of infection - entresto and lasix were held temporarily and restarted post op under nephro guidance. PT was consulted recommending home PT 61M with PMH of HTN, CAD/MI s/p stents, HFrEF (EF 15-20%) s/p AICD, T2DM on insulin p/w non healing foot ulcer after bunionectomy 5weeks ago, now with acutely worsening pain, swelling and erythema, a/w sepsis 2/2 cellulitis and r/o OM. Patient was admitted for further medical management Podiatry and ID were consulted for left foot submet 2 wound and LLE cellulitis. MRI showed  Plantar wound at the level of the third metatarsal head with osteomyelitis within the metatarsal head as well as in the proximal phalanx of the third digit. Patient was treated with IV abx and underwent L foot partial 3rd ray resection Friday 1/21. Bone culture preliminary w/ no growth. stable for discharge from pod standpoint pending final ID recs for abx, would recommend PO abx for soft tissue coveragestable for discharge from pod standpoint pending final ID recs for abx, would recommend PO abx for soft tissue coverage.  Nephrology was consulted in setting of MANJIT recommending likely in setting of infection - entresto and lasix were held temporarily and restarted post op under nephro guidance. PT was consulted recommending home PT 61M with PMH of HTN, CAD/MI s/p stents, HFrEF (EF 15-20%) s/p AICD, T2DM on insulin p/w non healing foot ulcer after bunionectomy 5weeks ago, now with acutely worsening pain, swelling and erythema, a/w sepsis 2/2 cellulitis and r/o OM. Patient was admitted for further medical management Podiatry and ID were consulted for left foot submet 2 wound and LLE cellulitis. MRI showed  Plantar wound at the level of the third metatarsal head with osteomyelitis within the metatarsal head as well as in the proximal phalanx of the third digit. Patient was treated with IV abx and underwent L foot partial 3rd ray resection Friday 1/21. Bone culture preliminary w/ no growth. stable for discharge from pod standpoint, ID recs 5 days PO Augmentin upon d/c.  Nephrology was consulted in setting of MANJIT recommending likely in setting of infection - entresto and lasix were held temporarily and restarted post op under nephro guidance. PT was consulted recommending home PT.    Discharge/Dispo/Med rec discussed with attending. Patient medically cleared for discharge home with outpatient follow up with PCP, nephrology, cardiology and podiatry 61M with PMH of HTN, CAD/MI s/p stents, HFrEF (EF 15-20%) s/p AICD, T2DM on insulin p/w non healing foot ulcer after bunionectomy 5weeks ago, now with acutely worsening pain, swelling and erythema, a/w sepsis 2/2 cellulitis and r/o OM. Patient was admitted for further medical management Podiatry and ID were consulted for left foot submet 2 wound and LLE cellulitis. MRI showed  Plantar wound at the level of the third metatarsal head with osteomyelitis within the metatarsal head as well as in the proximal phalanx of the third digit. Patient was treated with IV abx and underwent L foot partial 3rd ray resection Friday 1/21. Bone culture preliminary w/ no growth. stable for discharge from pod standpoint, ID recs 5 days PO Augmentin upon d/c.  Nephrology was consulted in setting of MANJIT recommending likely in setting of infection - entresto and lasix were held temporarily and restarted post op under nephro guidance. PT was consulted recommending home PT.    Discharge/Dispo/Med rec discussed with attending. Patient medically cleared for discharge home with outpatient follow up with PCP, nephrology, cardiology and podiatry       Advanced care planning was discussed with patient and family.  Advanced care planning forms were reviewed and discussed as appropriate.  Differential diagnosis and plan of care discussed with patient after the evaluation.   Pain assessed and judicious use of narcotics when appropriate was discussed.  Importance of Fall prevention discussed.  Counseling on Smoking and Alcohol cessation was offered when appropriate.  Counseling on Diet, exercise, and medication compliance was done.       Approx 65 minutes spent.

## 2022-01-21 NOTE — PROGRESS NOTE ADULT - ASSESSMENT
61M with PMH of HTN, CAD/MI s/p stents, HFrEF (EF 15-20%) s/p AICD, T2DM on insulin p/w foot ulcer with foot cellulitis, diabetic foot infection, leukocytosis, sepsis     Sam Orozco  Attending Physician   Division of Infectious Disease  Pager #[phone]  Available on Microsoft Teams also  After 5pm/weekend or no response, call #[phone]

## 2022-01-21 NOTE — DISCHARGE NOTE PROVIDER - NSDCFUADDINST_GEN_ALL_CORE_FT
Podiatry Discharge Instructions:  Follow up: Please follow up with Dr. Sigala within 1 week of discharge from the hospital, please call [phone] for appointment and discuss that you recently were seen in the hospital.  Wound Care: Please leave your dressing clean dry intact until your follow up appointment.  Weight bearing: Please weight bear as tolerated to the left heel in a surgical shoe.  Antibiotics: Please continue as instructed. Podiatry Discharge Instructions:  Follow up: Please follow up with Dr. Sigala within 1 week of discharge from the hospital, please call [phone] for appointment and discuss that you recently were seen in the hospital.  Wound Care: Please apply 1/4" iodoform packing material to left foot plantar 3rd metatarsal wound, apply 4x4 gauze to surgical incision site, dress w/ bruce and ACE.   Weight bearing: Please weight bear as tolerated to the left heel in a surgical shoe.  Antibiotics: Please continue as instructed. Podiatry Discharge Instructions:  Follow up: Please follow up with Dr. Sigala within 1 week of discharge from the hospital, please call [phone] for appointment and discuss that you recently were seen in the hospital.  Wound Care: Please apply 1/4" iodoform packing material to left foot plantar foot wound, apply 4x4 gauze to surgical incision site, dress w/ bruce and ACE.   Weight bearing: Please weight bear as tolerated to the left heel in CAM walker boot .  Antibiotics: Please continue as instructed.

## 2022-01-22 LAB
ANION GAP SERPL CALC-SCNC: 17 MMOL/L — SIGNIFICANT CHANGE UP (ref 5–17)
BUN SERPL-MCNC: 54 MG/DL — HIGH (ref 7–23)
CALCIUM SERPL-MCNC: 9 MG/DL — SIGNIFICANT CHANGE UP (ref 8.4–10.5)
CHLORIDE SERPL-SCNC: 96 MMOL/L — SIGNIFICANT CHANGE UP (ref 96–108)
CO2 SERPL-SCNC: 21 MMOL/L — LOW (ref 22–31)
CREAT SERPL-MCNC: 1.77 MG/DL — HIGH (ref 0.5–1.3)
CULTURE RESULTS: SIGNIFICANT CHANGE UP
CULTURE RESULTS: SIGNIFICANT CHANGE UP
GLUCOSE BLDC GLUCOMTR-MCNC: 141 MG/DL — HIGH (ref 70–99)
GLUCOSE BLDC GLUCOMTR-MCNC: 182 MG/DL — HIGH (ref 70–99)
GLUCOSE BLDC GLUCOMTR-MCNC: 221 MG/DL — HIGH (ref 70–99)
GLUCOSE BLDC GLUCOMTR-MCNC: 282 MG/DL — HIGH (ref 70–99)
GLUCOSE SERPL-MCNC: 200 MG/DL — HIGH (ref 70–99)
GRAM STN FLD: SIGNIFICANT CHANGE UP
HCT VFR BLD CALC: 32.8 % — LOW (ref 39–50)
HGB BLD-MCNC: 10.2 G/DL — LOW (ref 13–17)
MCHC RBC-ENTMCNC: 23.4 PG — LOW (ref 27–34)
MCHC RBC-ENTMCNC: 31.1 GM/DL — LOW (ref 32–36)
MCV RBC AUTO: 75.4 FL — LOW (ref 80–100)
NRBC # BLD: 0 /100 WBCS — SIGNIFICANT CHANGE UP (ref 0–0)
PLATELET # BLD AUTO: 252 K/UL — SIGNIFICANT CHANGE UP (ref 150–400)
POTASSIUM SERPL-MCNC: 3.6 MMOL/L — SIGNIFICANT CHANGE UP (ref 3.5–5.3)
POTASSIUM SERPL-SCNC: 3.6 MMOL/L — SIGNIFICANT CHANGE UP (ref 3.5–5.3)
RBC # BLD: 4.35 M/UL — SIGNIFICANT CHANGE UP (ref 4.2–5.8)
RBC # FLD: 14.8 % — HIGH (ref 10.3–14.5)
SODIUM SERPL-SCNC: 134 MMOL/L — LOW (ref 135–145)
SPECIMEN SOURCE: SIGNIFICANT CHANGE UP
WBC # BLD: 8.99 K/UL — SIGNIFICANT CHANGE UP (ref 3.8–10.5)
WBC # FLD AUTO: 8.99 K/UL — SIGNIFICANT CHANGE UP (ref 3.8–10.5)

## 2022-01-22 RX ORDER — ACETAMINOPHEN 500 MG
1000 TABLET ORAL ONCE
Refills: 0 | Status: COMPLETED | OUTPATIENT
Start: 2022-01-22 | End: 2022-01-22

## 2022-01-22 RX ORDER — INSULIN GLARGINE 100 [IU]/ML
67 INJECTION, SOLUTION SUBCUTANEOUS AT BEDTIME
Refills: 0 | Status: DISCONTINUED | OUTPATIENT
Start: 2022-01-22 | End: 2022-01-25

## 2022-01-22 RX ORDER — INSULIN LISPRO 100/ML
14 VIAL (ML) SUBCUTANEOUS
Refills: 0 | Status: DISCONTINUED | OUTPATIENT
Start: 2022-01-22 | End: 2022-01-25

## 2022-01-22 RX ADMIN — Medication 81 MILLIGRAM(S): at 12:01

## 2022-01-22 RX ADMIN — INSULIN GLARGINE 67 UNIT(S): 100 INJECTION, SOLUTION SUBCUTANEOUS at 22:36

## 2022-01-22 RX ADMIN — Medication 2: at 08:50

## 2022-01-22 RX ADMIN — AMPICILLIN SODIUM AND SULBACTAM SODIUM 200 GRAM(S): 250; 125 INJECTION, POWDER, FOR SUSPENSION INTRAMUSCULAR; INTRAVENOUS at 06:42

## 2022-01-22 RX ADMIN — CLOPIDOGREL BISULFATE 75 MILLIGRAM(S): 75 TABLET, FILM COATED ORAL at 12:01

## 2022-01-22 RX ADMIN — Medication 1000 MILLIGRAM(S): at 00:50

## 2022-01-22 RX ADMIN — Medication 650 MILLIGRAM(S): at 23:15

## 2022-01-22 RX ADMIN — AMPICILLIN SODIUM AND SULBACTAM SODIUM 200 GRAM(S): 250; 125 INJECTION, POWDER, FOR SUSPENSION INTRAMUSCULAR; INTRAVENOUS at 15:11

## 2022-01-22 RX ADMIN — Medication 11 UNIT(S): at 08:51

## 2022-01-22 RX ADMIN — SACUBITRIL AND VALSARTAN 1 TABLET(S): 24; 26 TABLET, FILM COATED ORAL at 06:42

## 2022-01-22 RX ADMIN — AMPICILLIN SODIUM AND SULBACTAM SODIUM 200 GRAM(S): 250; 125 INJECTION, POWDER, FOR SUSPENSION INTRAMUSCULAR; INTRAVENOUS at 22:37

## 2022-01-22 RX ADMIN — PANTOPRAZOLE SODIUM 40 MILLIGRAM(S): 20 TABLET, DELAYED RELEASE ORAL at 06:41

## 2022-01-22 RX ADMIN — Medication 14 UNIT(S): at 17:35

## 2022-01-22 RX ADMIN — SACUBITRIL AND VALSARTAN 1 TABLET(S): 24; 26 TABLET, FILM COATED ORAL at 18:17

## 2022-01-22 RX ADMIN — ENOXAPARIN SODIUM 40 MILLIGRAM(S): 100 INJECTION SUBCUTANEOUS at 12:02

## 2022-01-22 RX ADMIN — ATORVASTATIN CALCIUM 80 MILLIGRAM(S): 80 TABLET, FILM COATED ORAL at 22:45

## 2022-01-22 RX ADMIN — Medication 400 MILLIGRAM(S): at 00:20

## 2022-01-22 RX ADMIN — MUPIROCIN 1 APPLICATION(S): 20 OINTMENT TOPICAL at 08:00

## 2022-01-22 RX ADMIN — TAMSULOSIN HYDROCHLORIDE 0.4 MILLIGRAM(S): 0.4 CAPSULE ORAL at 22:37

## 2022-01-22 RX ADMIN — Medication 40 MILLIGRAM(S): at 06:41

## 2022-01-22 RX ADMIN — Medication 650 MILLIGRAM(S): at 22:46

## 2022-01-22 RX ADMIN — Medication 1: at 17:34

## 2022-01-22 RX ADMIN — Medication 11 UNIT(S): at 13:22

## 2022-01-22 RX ADMIN — MUPIROCIN 1 APPLICATION(S): 20 OINTMENT TOPICAL at 18:18

## 2022-01-22 RX ADMIN — Medication 3: at 13:21

## 2022-01-22 NOTE — PROGRESS NOTE ADULT - SUBJECTIVE AND OBJECTIVE BOX
Chief complaint    Patient is a 61y old  Male who presents with a chief complaint of foot ulcer (22 Jan 2022 09:26)   Review of systems  Patient in bed, appears comfortable.    Labs and Fingersticks  CAPILLARY BLOOD GLUCOSE      POCT Blood Glucose.: 182 mg/dL (22 Jan 2022 17:30)  POCT Blood Glucose.: 282 mg/dL (22 Jan 2022 13:01)  POCT Blood Glucose.: 221 mg/dL (22 Jan 2022 08:25)  POCT Blood Glucose.: 224 mg/dL (21 Jan 2022 21:18)      Anion Gap, Serum: 17 (01-22 @ 07:14)  Anion Gap, Serum: 17 (01-21 @ 07:00)      Calcium, Total Serum: 9.0 (01-22 @ 07:14)  Calcium, Total Serum: 9.0 (01-21 @ 07:00)          01-22    134<L>  |  96  |  54<H>  ----------------------------<  200<H>  3.6   |  21<L>  |  1.77<H>    Ca    9.0      22 Jan 2022 07:14                          10.2   8.99  )-----------( 252      ( 22 Jan 2022 07:14 )             32.8     Medications  MEDICATIONS  (STANDING):  ampicillin/sulbactam  IVPB 3 Gram(s) IV Intermittent every 8 hours  aspirin  chewable 81 milliGRAM(s) Oral daily  atorvastatin 80 milliGRAM(s) Oral at bedtime  clopidogrel Tablet 75 milliGRAM(s) Oral daily  dextrose 40% Gel 15 Gram(s) Oral once  dextrose 5%. 1000 milliLiter(s) (50 mL/Hr) IV Continuous <Continuous>  dextrose 5%. 1000 milliLiter(s) (100 mL/Hr) IV Continuous <Continuous>  dextrose 50% Injectable 25 Gram(s) IV Push once  dextrose 50% Injectable 12.5 Gram(s) IV Push once  dextrose 50% Injectable 25 Gram(s) IV Push once  enoxaparin Injectable 40 milliGRAM(s) SubCutaneous daily  furosemide    Tablet 40 milliGRAM(s) Oral daily  glucagon  Injectable 1 milliGRAM(s) IntraMuscular once  influenza   Vaccine 0.5 milliLiter(s) IntraMuscular once  insulin glargine Injectable (LANTUS) 67 Unit(s) SubCutaneous at bedtime  insulin lispro (ADMELOG) corrective regimen sliding scale   SubCutaneous three times a day before meals  insulin lispro (ADMELOG) corrective regimen sliding scale   SubCutaneous at bedtime  insulin lispro Injectable (ADMELOG) 14 Unit(s) SubCutaneous three times a day before meals  mupirocin 2% Ointment 1 Application(s) Topical two times a day  pantoprazole    Tablet 40 milliGRAM(s) Oral before breakfast  sacubitril 49 mG/valsartan 51 mG 1 Tablet(s) Oral two times a day  tamsulosin 0.4 milliGRAM(s) Oral at bedtime      Physical Exam  Culture - Tissue with Gram Stain (collected 01-21-22 @ 22:21)  Source: .Tissue  Gram Stain (01-22-22 @ 03:35):    No polymorphonuclear cells seen per low power field    No organisms seen per oil power field      General: Patient comfortable in bed  Vital Signs Last 12 Hrs  T(F): 98.1 (01-22-22 @ 12:40), Max: 98.1 (01-22-22 @ 12:40)  HR: 97 (01-22-22 @ 12:40) (97 - 97)  BP: 128/73 (01-22-22 @ 12:40) (128/73 - 128/73)  BP(mean): --  RR: 18 (01-22-22 @ 12:40) (18 - 18)  SpO2: 97% (01-22-22 @ 12:40) (97% - 97%)  Neck: No palpable thyroid nodules.  CVS: S1S2, No murmurs  Respiratory: No wheezing, no crepitations  GI: Abdomen soft, bowel sounds positive  Musculoskeletal:  edema lower extremities.     Diagnostics

## 2022-01-22 NOTE — PROGRESS NOTE ADULT - ASSESSMENT
60yo M s/p left foot partial 3rd ray resection (DOS 1/21)  - pt seen and evaluated  - afebrile, no leukocytosis  - left foot surgical incision site well coapted w/ sutures intact, no hematoma, skin flaps warm to touch, mild chantel-incision erythema to forefoot  - low concern for residual infection  - low concern for viability  - discharge pending clean bone culture w/ no growth x 2days, will f/u final ID recs for abx   - instructions for discharge in provider note under follow up  - discussed w/ attending

## 2022-01-22 NOTE — PHYSICAL THERAPY INITIAL EVALUATION ADULT - LEVEL OF INDEPENDENCE: STAIR NEGOTIATION, REHAB EVAL
[facility]  [address] [address]  Phone:  [phone]  Fax:  [phone]                                    Chani Martins   MRN: [id number]    Department:  [facility]   Date of Visit:  7/23/2019           After Visit Summary Signature Page    I have received my discharge instructions, and my questions have been answered. I have discussed any challenges I see with this plan with the nurse or doctor.    ..........................................................................................................................................  Patient/Patient Representative Signature      ..........................................................................................................................................  Patient Representative Print Name and Relationship to Patient    ..................................................               ................................................  Date                                   Time    ..........................................................................................................................................  Reviewed by Signature/Title    ...................................................              ..............................................  Date                                               Time          22EPIC Rev 08/18       
contact guard

## 2022-01-22 NOTE — PHYSICAL THERAPY INITIAL EVALUATION ADULT - PERTINENT HX OF CURRENT PROBLEM, REHAB EVAL
61 Y M H/O HTN,  CAD, DM, presenting with diabetic ulcer. States 5 weeks ago experienced diabetic ulcer post-bunion procedure. Since then experienced increasing swelling of foot, rigors, vomiting, failed trial of levafloxacin, started on clindamycin several days ago with no improvement. Today experienced acute increase in swelling from mid ankle to mid calf with redness, difficulty ambulating due to pain.

## 2022-01-22 NOTE — PHYSICAL THERAPY INITIAL EVALUATION ADULT - PRECAUTIONS/LIMITATIONS, REHAB EVAL
X ray tibia fibulaL, ankle L, Foot L- no acute fx/dislocation. X ray tibia fibula L, ankle L, Foot L- no acute fx/dislocation.

## 2022-01-22 NOTE — PHYSICAL THERAPY INITIAL EVALUATION ADULT - ADDITIONAL COMMENTS
Patient lives in pvt house with spouse,3 steps to enter. No steps inside.  Patient ambulated without AD independent.

## 2022-01-22 NOTE — PROGRESS NOTE ADULT - SUBJECTIVE AND OBJECTIVE BOX
SEVERIANO MARTINEZ  61y Male  MRN:[id number]    Patient is a 61y old  Male who presents with a chief complaint of foot ulcer (18 Jan 2022 12:11)    HPI:  61M with PMH of HTN, CAD/MI s/p stents, HFrEF (EF 15-20%) s/p AICD, T2DM on insulin p/w foot ulcer. Pt had bunionectomy of L foot approx 5 weeks ago with poor wound healing since that time. In the past 2 weeks had started noticing increasing pain, swelling and erythema at site of the wound and was started on levaquin in 1/4/21 x 10 days without any improvement in symptoms. Was started on clindamycin 2-3 days ago without any improvement. Pt states he has 5/10 intermittent sharp pain at site of the wound and initially had only localized swelling and erythema; however, in past 24 hours started noticing the swelling and erythema moving proximally, now with swelling to his knees and erythema to mid calf. Pain has also worsened but remains localized, non radiating but worse with weight bearing or ambulation; has been taking Tylenol or Advil for pain with some relief. Endorses minimal clear discharge from wound. Went to see podiatrist today who referred him to ED for further evaluation. Denies any fevers, +chills ongoing x weeks, +1 episode of nausea with vomiting this morning, no abd pain, no CP, SOB, no GI or  symptoms.  (17 Jan 2022 23:43)      Patient seen and evaluated at bedside. No acute events overnight except as noted.    Interval HPI: no acute events o/n       PAST MEDICAL & SURGICAL HISTORY:  Stented coronary artery    Diabetes    AICD (automatic cardioverter/defibrillator) present    Hypertension    Heart failure with reduced ejection fraction    History of ischemic cardiomyopathy    History of COPD    H/O gastroesophageal reflux (GERD)    H/O vasectomy  20 yrs ago (2000)        REVIEW OF SYSTEMS:  as per hpi     VITALS:   Vital Signs Last 24 Hrs  T(C): 36.7 (22 Jan 2022 12:40), Max: 36.7 (22 Jan 2022 12:40)  T(F): 98.1 (22 Jan 2022 12:40), Max: 98.1 (22 Jan 2022 12:40)  HR: 97 (22 Jan 2022 12:40) (97 - 102)  BP: 128/73 (22 Jan 2022 12:40) (109/71 - 128/73)  BP(mean): --  RR: 18 (22 Jan 2022 12:40) (18 - 18)  SpO2: 97% (22 Jan 2022 12:40) (93% - 97%)      PHYSICAL EXAM:  GENERAL: NAD, well-developed  HEAD:  Atraumatic, Normocephalic  EYES: EOMI, PERRLA, conjunctiva and sclera clear  NECK: Supple, No JVD  CHEST/LUNG: Clear to auscultation bilaterally; No wheeze  HEART: S1, S2; No murmurs, rubs, or gallops  ABDOMEN: Soft, Nontender, Nondistended; Bowel sounds present  EXTREMITIES:  2+ Peripheral Pulses, No clubbing, cyanosis, or edema. LLE dressing   PSYCH: Normal affect  NEUROLOGY: AAOX3; non-focal  SKIN: No rashes or lesions    Consultant(s) Notes Reviewed:  [x ] YES  [ ] NO  Care Discussed with Consultants/Other Providers [ x] YES  [ ] NO    MEDS:   MEDICATIONS  (STANDING):  ampicillin/sulbactam  IVPB 3 Gram(s) IV Intermittent every 8 hours  aspirin  chewable 81 milliGRAM(s) Oral daily  atorvastatin 80 milliGRAM(s) Oral at bedtime  clopidogrel Tablet 75 milliGRAM(s) Oral daily  dextrose 40% Gel 15 Gram(s) Oral once  dextrose 5%. 1000 milliLiter(s) (50 mL/Hr) IV Continuous <Continuous>  dextrose 5%. 1000 milliLiter(s) (100 mL/Hr) IV Continuous <Continuous>  dextrose 50% Injectable 25 Gram(s) IV Push once  dextrose 50% Injectable 12.5 Gram(s) IV Push once  dextrose 50% Injectable 25 Gram(s) IV Push once  enoxaparin Injectable 40 milliGRAM(s) SubCutaneous daily  furosemide    Tablet 40 milliGRAM(s) Oral daily  glucagon  Injectable 1 milliGRAM(s) IntraMuscular once  influenza   Vaccine 0.5 milliLiter(s) IntraMuscular once  insulin glargine Injectable (LANTUS) 67 Unit(s) SubCutaneous at bedtime  insulin lispro (ADMELOG) corrective regimen sliding scale   SubCutaneous three times a day before meals  insulin lispro (ADMELOG) corrective regimen sliding scale   SubCutaneous at bedtime  insulin lispro Injectable (ADMELOG) 14 Unit(s) SubCutaneous three times a day before meals  mupirocin 2% Ointment 1 Application(s) Topical two times a day  pantoprazole    Tablet 40 milliGRAM(s) Oral before breakfast  sacubitril 49 mG/valsartan 51 mG 1 Tablet(s) Oral two times a day  tamsulosin 0.4 milliGRAM(s) Oral at bedtime    MEDICATIONS  (PRN):  acetaminophen     Tablet .. 650 milliGRAM(s) Oral every 6 hours PRN Temp greater or equal to 38C (100.4F), Mild Pain (1 - 3)  ALPRAZolam 0.25 milliGRAM(s) Oral two times a day PRN anxiety  aluminum hydroxide/magnesium hydroxide/simethicone Suspension 30 milliLiter(s) Oral every 4 hours PRN Dyspepsia  melatonin 3 milliGRAM(s) Oral at bedtime PRN Insomnia  ondansetron Injectable 4 milliGRAM(s) IV Push every 8 hours PRN Nausea and/or Vomiting      ALLERGIES:  No Known Allergies      LABS:                                                        10.2   8.99  )-----------( 252      ( 22 Jan 2022 07:14 )             32.8   01-22    134<L>  |  96  |  54<H>  ----------------------------<  200<H>  3.6   |  21<L>  |  1.77<H>    Ca    9.0      22 Jan 2022 07:14          < from: MR Foot w/wo IV Cont, Left (01.19.22 @ 17:06) >  IMPRESSION:    Plantar wound at the level of the third metatarsal head with   osteomyelitis within the metatarsal head as well as in the proximal   phalanx of the third digit.    --- End of Report ---    < end of copied text >     < from: [facility] Duplex Lower Ext Vein Scan, Left (01.17.22 @ 20:05) >    IMPRESSION:  No evidence of left lower extremity deep venous thrombosis.      < end of copied text >  < from: Xray Tibia + Fibula 2 Views, Left (01.17.22 @ 15:46) >  IMPRESSION:  Soft tissue swelling about the ankle and proximal foot, without   subcutaneous emphysema or radiographic evidence of necrotizing fasciitis.    No acute fracture or dislocation.    < end of copied text >

## 2022-01-22 NOTE — PROGRESS NOTE ADULT - ASSESSMENT
Assessment  DMT2: 61y Male with DM T2 with hyperglycemia admitted with foot wound,  patient was on insulin at home, now on basal bolus insulin, blood sugars trending down, no hypoglycemias, left foot partial resection.  Foot wound: on medications, monitored, asymptomatic.  HTN: On antihypertensive medications, monitored, asymptomatic.  Overweight: No strict exercise routines, neither on low calorie diet.       Cortney Flanagan MD  Cell: [phone]  Office: [phone]

## 2022-01-22 NOTE — PROGRESS NOTE ADULT - SUBJECTIVE AND OBJECTIVE BOX
Podiatry pager #: [phone] ([place])/ [phone] ([facility])    Patient is a 61y old  Male who presents with a chief complaint of foot ulcer (21 Jan 2022 22:14)       INTERVAL HPI/OVERNIGHT EVENTS:  Patient seen and evaluated at bedside.  Pt is resting comfortable in NAD. Denies N/V/F/C.     Allergies    No Known Allergies    Intolerances        Vital Signs Last 24 Hrs  T(C): 36.6 (22 Jan 2022 04:55), Max: 36.9 (21 Jan 2022 20:55)  T(F): 97.8 (22 Jan 2022 04:55), Max: 98.5 (21 Jan 2022 20:55)  HR: 102 (22 Jan 2022 04:55) (81 - 102)  BP: 109/71 (22 Jan 2022 04:55) (94/61 - 126/72)  BP(mean): 71 (21 Jan 2022 13:42) (70 - 79)  RR: 18 (22 Jan 2022 04:55) (15 - 19)  SpO2: 93% (22 Jan 2022 04:55) (92% - 97%)    LABS:                        10.2   8.99  )-----------( 252      ( 22 Jan 2022 07:14 )             32.8     01-22    134<L>  |  96  |  54<H>  ----------------------------<  200<H>  3.6   |  21<L>  |  1.77<H>    Ca    9.0      22 Jan 2022 07:14      PT/INR - ( 21 Jan 2022 07:00 )   PT: 15.4 sec;   INR: 1.30 ratio         PTT - ( 21 Jan 2022 07:00 )  PTT:33.0 sec    CAPILLARY BLOOD GLUCOSE      POCT Blood Glucose.: 221 mg/dL (22 Jan 2022 08:25)  POCT Blood Glucose.: 224 mg/dL (21 Jan 2022 21:18)  POCT Blood Glucose.: 211 mg/dL (21 Jan 2022 17:25)  POCT Blood Glucose.: 179 mg/dL (21 Jan 2022 12:16)      Lower Extremity Physical Exam:  Vasular: DP/PT 2/4, B/L, CFT <3 seconds B/L, Temperature gradient warm to warm on the L and warm to cool on the R  Neuro: Epicritic sensation diminished to the level of ankle, B/L.  Musculoskeletal/Ortho: unremarkable  Skin: s/p left foot partial 3rd ray resection closed (DOS 1/21): skin well coapted, sutures intact, skin flaps warm to touch, no hematoma, mild erythema to forefoot     RADIOLOGY & ADDITIONAL TESTS:   Female

## 2022-01-23 LAB
ANION GAP SERPL CALC-SCNC: 17 MMOL/L — SIGNIFICANT CHANGE UP (ref 5–17)
BUN SERPL-MCNC: 43 MG/DL — HIGH (ref 7–23)
CALCIUM SERPL-MCNC: 9.1 MG/DL — SIGNIFICANT CHANGE UP (ref 8.4–10.5)
CHLORIDE SERPL-SCNC: 98 MMOL/L — SIGNIFICANT CHANGE UP (ref 96–108)
CO2 SERPL-SCNC: 23 MMOL/L — SIGNIFICANT CHANGE UP (ref 22–31)
CREAT SERPL-MCNC: 1.51 MG/DL — HIGH (ref 0.5–1.3)
CULTURE RESULTS: SIGNIFICANT CHANGE UP
GLUCOSE BLDC GLUCOMTR-MCNC: 107 MG/DL — HIGH (ref 70–99)
GLUCOSE BLDC GLUCOMTR-MCNC: 117 MG/DL — HIGH (ref 70–99)
GLUCOSE BLDC GLUCOMTR-MCNC: 137 MG/DL — HIGH (ref 70–99)
GLUCOSE BLDC GLUCOMTR-MCNC: 144 MG/DL — HIGH (ref 70–99)
GLUCOSE SERPL-MCNC: 127 MG/DL — HIGH (ref 70–99)
HCT VFR BLD CALC: 33.4 % — LOW (ref 39–50)
HGB BLD-MCNC: 10.2 G/DL — LOW (ref 13–17)
MCHC RBC-ENTMCNC: 23.7 PG — LOW (ref 27–34)
MCHC RBC-ENTMCNC: 30.5 GM/DL — LOW (ref 32–36)
MCV RBC AUTO: 77.5 FL — LOW (ref 80–100)
NRBC # BLD: 0 /100 WBCS — SIGNIFICANT CHANGE UP (ref 0–0)
PLATELET # BLD AUTO: 249 K/UL — SIGNIFICANT CHANGE UP (ref 150–400)
POTASSIUM SERPL-MCNC: 3.5 MMOL/L — SIGNIFICANT CHANGE UP (ref 3.5–5.3)
POTASSIUM SERPL-SCNC: 3.5 MMOL/L — SIGNIFICANT CHANGE UP (ref 3.5–5.3)
RBC # BLD: 4.31 M/UL — SIGNIFICANT CHANGE UP (ref 4.2–5.8)
RBC # FLD: 14.9 % — HIGH (ref 10.3–14.5)
SODIUM SERPL-SCNC: 138 MMOL/L — SIGNIFICANT CHANGE UP (ref 135–145)
SPECIMEN SOURCE: SIGNIFICANT CHANGE UP
WBC # BLD: 7.48 K/UL — SIGNIFICANT CHANGE UP (ref 3.8–10.5)
WBC # FLD AUTO: 7.48 K/UL — SIGNIFICANT CHANGE UP (ref 3.8–10.5)

## 2022-01-23 RX ADMIN — AMPICILLIN SODIUM AND SULBACTAM SODIUM 200 GRAM(S): 250; 125 INJECTION, POWDER, FOR SUSPENSION INTRAMUSCULAR; INTRAVENOUS at 05:46

## 2022-01-23 RX ADMIN — SACUBITRIL AND VALSARTAN 1 TABLET(S): 24; 26 TABLET, FILM COATED ORAL at 18:22

## 2022-01-23 RX ADMIN — MUPIROCIN 1 APPLICATION(S): 20 OINTMENT TOPICAL at 05:47

## 2022-01-23 RX ADMIN — INSULIN GLARGINE 67 UNIT(S): 100 INJECTION, SOLUTION SUBCUTANEOUS at 22:55

## 2022-01-23 RX ADMIN — AMPICILLIN SODIUM AND SULBACTAM SODIUM 200 GRAM(S): 250; 125 INJECTION, POWDER, FOR SUSPENSION INTRAMUSCULAR; INTRAVENOUS at 14:59

## 2022-01-23 RX ADMIN — Medication 14 UNIT(S): at 18:21

## 2022-01-23 RX ADMIN — Medication 14 UNIT(S): at 09:03

## 2022-01-23 RX ADMIN — MUPIROCIN 1 APPLICATION(S): 20 OINTMENT TOPICAL at 18:22

## 2022-01-23 RX ADMIN — CLOPIDOGREL BISULFATE 75 MILLIGRAM(S): 75 TABLET, FILM COATED ORAL at 12:00

## 2022-01-23 RX ADMIN — ENOXAPARIN SODIUM 40 MILLIGRAM(S): 100 INJECTION SUBCUTANEOUS at 11:59

## 2022-01-23 RX ADMIN — ATORVASTATIN CALCIUM 80 MILLIGRAM(S): 80 TABLET, FILM COATED ORAL at 22:55

## 2022-01-23 RX ADMIN — Medication 81 MILLIGRAM(S): at 12:00

## 2022-01-23 RX ADMIN — AMPICILLIN SODIUM AND SULBACTAM SODIUM 200 GRAM(S): 250; 125 INJECTION, POWDER, FOR SUSPENSION INTRAMUSCULAR; INTRAVENOUS at 22:55

## 2022-01-23 RX ADMIN — PANTOPRAZOLE SODIUM 40 MILLIGRAM(S): 20 TABLET, DELAYED RELEASE ORAL at 06:05

## 2022-01-23 RX ADMIN — TAMSULOSIN HYDROCHLORIDE 0.4 MILLIGRAM(S): 0.4 CAPSULE ORAL at 22:55

## 2022-01-23 RX ADMIN — SACUBITRIL AND VALSARTAN 1 TABLET(S): 24; 26 TABLET, FILM COATED ORAL at 05:53

## 2022-01-23 RX ADMIN — Medication 14 UNIT(S): at 12:36

## 2022-01-23 RX ADMIN — Medication 40 MILLIGRAM(S): at 05:46

## 2022-01-23 NOTE — PROGRESS NOTE ADULT - SUBJECTIVE AND OBJECTIVE BOX
[facility]   [phone]  RENAL FOLLOW UP NOTE  --------------------------------------------------------------------------------  Chief Complaint:    24 hour events/subjective:    seen earlier   states intermittent pain left foot. does c/o edema left leg     PAST HISTORY  --------------------------------------------------------------------------------  No significant changes to PMH, PSH, FHx, SHx, unless otherwise noted    ALLERGIES & MEDICATIONS  --------------------------------------------------------------------------------  Allergies    No Known Allergies    Intolerances      Standing Inpatient Medications  ampicillin/sulbactam  IVPB 3 Gram(s) IV Intermittent every 8 hours  aspirin  chewable 81 milliGRAM(s) Oral daily  atorvastatin 80 milliGRAM(s) Oral at bedtime  clopidogrel Tablet 75 milliGRAM(s) Oral daily  dextrose 40% Gel 15 Gram(s) Oral once  dextrose 5%. 1000 milliLiter(s) IV Continuous <Continuous>  dextrose 5%. 1000 milliLiter(s) IV Continuous <Continuous>  dextrose 50% Injectable 25 Gram(s) IV Push once  dextrose 50% Injectable 12.5 Gram(s) IV Push once  dextrose 50% Injectable 25 Gram(s) IV Push once  enoxaparin Injectable 40 milliGRAM(s) SubCutaneous daily  furosemide    Tablet 40 milliGRAM(s) Oral daily  glucagon  Injectable 1 milliGRAM(s) IntraMuscular once  influenza   Vaccine 0.5 milliLiter(s) IntraMuscular once  insulin glargine Injectable (LANTUS) 67 Unit(s) SubCutaneous at bedtime  insulin lispro (ADMELOG) corrective regimen sliding scale   SubCutaneous three times a day before meals  insulin lispro (ADMELOG) corrective regimen sliding scale   SubCutaneous at bedtime  insulin lispro Injectable (ADMELOG) 14 Unit(s) SubCutaneous three times a day before meals  mupirocin 2% Ointment 1 Application(s) Topical two times a day  pantoprazole    Tablet 40 milliGRAM(s) Oral before breakfast  sacubitril 49 mG/valsartan 51 mG 1 Tablet(s) Oral two times a day  tamsulosin 0.4 milliGRAM(s) Oral at bedtime    PRN Inpatient Medications  acetaminophen     Tablet .. 650 milliGRAM(s) Oral every 6 hours PRN  ALPRAZolam 0.25 milliGRAM(s) Oral two times a day PRN  aluminum hydroxide/magnesium hydroxide/simethicone Suspension 30 milliLiter(s) Oral every 4 hours PRN  melatonin 3 milliGRAM(s) Oral at bedtime PRN  ondansetron Injectable 4 milliGRAM(s) IV Push every 8 hours PRN      REVIEW OF SYSTEMS  --------------------------------------------------------------------------------    Gen: denies fevers/chills,  CVS: denies chest pain/palpitations  Resp: denies SOB/Cough  GI: Denies N/V/Abd pain  : Denies dysuria/oliguria/hematuria        VITALS/PHYSICAL EXAM  --------------------------------------------------------------------------------  T(C): 36.4 (01-23-22 @ 12:06), Max: 36.6 (01-22-22 @ 21:45)  HR: 100 (01-23-22 @ 12:06) (97 - 100)  BP: 97/64 (01-23-22 @ 12:06) (97/64 - 118/71)  RR: 18 (01-23-22 @ 12:06) (17 - 18)  SpO2: 97% (01-23-22 @ 12:06) (95% - 97%)  Wt(kg): --        01-22-22 @ 07:01  -  01-23-22 @ 07:00  --------------------------------------------------------  IN: 930 mL / OUT: 0 mL / NET: 930 mL    01-23-22 @ 07:01  -  01-23-22 @ 17:42  --------------------------------------------------------  IN: 600 mL / OUT: 0 mL / NET: 600 mL      Physical Exam:  	    Gen: Non toxic comfortable appearing   	no jvd  	Pulm: decrease bs  no rales or ronchi or wheezing  	CV: RRR, S1S2; no rub  	Abd: +BS, soft, nontender/nondistended  	: No suprapubic tenderness  	UE: Warm, no cyanosis  no clubbing,  trace RLE  edema  	LE: Warm, left leg edema 2- ; rle no edema left foot dressing   	Neuro: alert and oriented. speech coherent       LABS/STUDIES  --------------------------------------------------------------------------------              10.2   7.48  >-----------<  249      [01-23-22 @ 07:37]              33.4     138  |  98  |  43  ----------------------------<  127      [01-23-22 @ 07:43]  3.5   |  23  |  1.51        Ca     9.1     [01-23-22 @ 07:43]            Creatinine Trend:  SCr 1.51 [01-23 @ 07:43]  SCr 1.77 [01-22 @ 07:14]  SCr 1.90 [01-21 @ 07:00]  SCr 2.07 [01-20 @ 06:52]  SCr 2.57 [01-19 @ 17:20]              Urinalysis - [01-19-22 @ 06:25]      Color Light Yellow / Appearance Clear / SG 1.014 / pH 5.5      Gluc Negative / Ketone Negative  / Bili Negative / Urobili Negative       Blood Negative / Protein 30 mg/dL / Leuk Est Negative / Nitrite Negative      RBC 2 / WBC 1 / Hyaline 4 / Gran  / Sq Epi  / Non Sq Epi 0 / Bacteria Negative    Urine Creatinine 137      [01-19-22 @ 06:25]  Urine Protein 26      [01-19-22 @ 06:25]    HbA1c 8.9      [12-16-19 @ 08:15]

## 2022-01-23 NOTE — PROGRESS NOTE ADULT - SUBJECTIVE AND OBJECTIVE BOX
SEVERIANO MARTINEZ  61y Male  MRN:[id number]    Patient is a 61y old  Male who presents with a chief complaint of foot ulcer (18 Jan 2022 12:11)    HPI:  61M with PMH of HTN, CAD/MI s/p stents, HFrEF (EF 15-20%) s/p AICD, T2DM on insulin p/w foot ulcer. Pt had bunionectomy of L foot approx 5 weeks ago with poor wound healing since that time. In the past 2 weeks had started noticing increasing pain, swelling and erythema at site of the wound and was started on levaquin in 1/4/21 x 10 days without any improvement in symptoms. Was started on clindamycin 2-3 days ago without any improvement. Pt states he has 5/10 intermittent sharp pain at site of the wound and initially had only localized swelling and erythema; however, in past 24 hours started noticing the swelling and erythema moving proximally, now with swelling to his knees and erythema to mid calf. Pain has also worsened but remains localized, non radiating but worse with weight bearing or ambulation; has been taking Tylenol or Advil for pain with some relief. Endorses minimal clear discharge from wound. Went to see podiatrist today who referred him to ED for further evaluation. Denies any fevers, +chills ongoing x weeks, +1 episode of nausea with vomiting this morning, no abd pain, no CP, SOB, no GI or  symptoms.  (17 Jan 2022 23:43)      Patient seen and evaluated at bedside. No acute events overnight except as noted.    Interval HPI: no acute events o/n       PAST MEDICAL & SURGICAL HISTORY:  Stented coronary artery    Diabetes    AICD (automatic cardioverter/defibrillator) present    Hypertension    Heart failure with reduced ejection fraction    History of ischemic cardiomyopathy    History of COPD    H/O gastroesophageal reflux (GERD)    H/O vasectomy  20 yrs ago (2000)        REVIEW OF SYSTEMS:  as per hpi     VITALS:   Vital Signs Last 24 Hrs  T(C): 36.4 (23 Jan 2022 12:06), Max: 36.6 (22 Jan 2022 21:45)  T(F): 97.5 (23 Jan 2022 12:06), Max: 97.9 (22 Jan 2022 21:45)  HR: 100 (23 Jan 2022 12:06) (97 - 100)  BP: 97/64 (23 Jan 2022 12:06) (97/64 - 118/71)  BP(mean): --  RR: 18 (23 Jan 2022 12:06) (17 - 18)  SpO2: 97% (23 Jan 2022 12:06) (95% - 97%)    PHYSICAL EXAM:  GENERAL: NAD, well-developed  HEAD:  Atraumatic, Normocephalic  EYES: EOMI, PERRLA, conjunctiva and sclera clear  NECK: Supple, No JVD  CHEST/LUNG: Clear to auscultation bilaterally; No wheeze  HEART: S1, S2; No murmurs, rubs, or gallops  ABDOMEN: Soft, Nontender, Nondistended; Bowel sounds present  EXTREMITIES:  2+ Peripheral Pulses, No clubbing, cyanosis, or edema. LLE dressing   PSYCH: Normal affect  NEUROLOGY: AAOX3; non-focal  SKIN: No rashes or lesions    Consultant(s) Notes Reviewed:  [x ] YES  [ ] NO  Care Discussed with Consultants/Other Providers [ x] YES  [ ] NO    MEDS:   MEDICATIONS  (STANDING):  ampicillin/sulbactam  IVPB 3 Gram(s) IV Intermittent every 8 hours  aspirin  chewable 81 milliGRAM(s) Oral daily  atorvastatin 80 milliGRAM(s) Oral at bedtime  clopidogrel Tablet 75 milliGRAM(s) Oral daily  dextrose 40% Gel 15 Gram(s) Oral once  dextrose 5%. 1000 milliLiter(s) (100 mL/Hr) IV Continuous <Continuous>  dextrose 5%. 1000 milliLiter(s) (50 mL/Hr) IV Continuous <Continuous>  dextrose 50% Injectable 25 Gram(s) IV Push once  dextrose 50% Injectable 12.5 Gram(s) IV Push once  dextrose 50% Injectable 25 Gram(s) IV Push once  enoxaparin Injectable 40 milliGRAM(s) SubCutaneous daily  furosemide    Tablet 40 milliGRAM(s) Oral daily  glucagon  Injectable 1 milliGRAM(s) IntraMuscular once  influenza   Vaccine 0.5 milliLiter(s) IntraMuscular once  insulin glargine Injectable (LANTUS) 67 Unit(s) SubCutaneous at bedtime  insulin lispro (ADMELOG) corrective regimen sliding scale   SubCutaneous at bedtime  insulin lispro (ADMELOG) corrective regimen sliding scale   SubCutaneous three times a day before meals  insulin lispro Injectable (ADMELOG) 14 Unit(s) SubCutaneous three times a day before meals  mupirocin 2% Ointment 1 Application(s) Topical two times a day  pantoprazole    Tablet 40 milliGRAM(s) Oral before breakfast  sacubitril 49 mG/valsartan 51 mG 1 Tablet(s) Oral two times a day  tamsulosin 0.4 milliGRAM(s) Oral at bedtime    MEDICATIONS  (PRN):  acetaminophen     Tablet .. 650 milliGRAM(s) Oral every 6 hours PRN Temp greater or equal to 38C (100.4F), Mild Pain (1 - 3)  ALPRAZolam 0.25 milliGRAM(s) Oral two times a day PRN anxiety  aluminum hydroxide/magnesium hydroxide/simethicone Suspension 30 milliLiter(s) Oral every 4 hours PRN Dyspepsia  melatonin 3 milliGRAM(s) Oral at bedtime PRN Insomnia  ondansetron Injectable 4 milliGRAM(s) IV Push every 8 hours PRN Nausea and/or Vomiting      ALLERGIES:  No Known Allergies      LABS:                                                       10.2   7.48  )-----------( 249      ( 23 Jan 2022 07:37 )             33.4   01-23    138  |  98  |  43<H>  ----------------------------<  127<H>  3.5   |  23  |  1.51<H>    Ca    9.1      23 Jan 2022 07:43              < from: MR Foot w/wo IV Cont, Left (01.19.22 @ 17:06) >  IMPRESSION:    Plantar wound at the level of the third metatarsal head with   osteomyelitis within the metatarsal head as well as in the proximal   phalanx of the third digit.    --- End of Report ---    < end of copied text >     < from: [facility] Duplex Lower Ext Vein Scan, Left (01.17.22 @ 20:05) >    IMPRESSION:  No evidence of left lower extremity deep venous thrombosis.      < end of copied text >  < from: Xray Tibia + Fibula 2 Views, Left (01.17.22 @ 15:46) >  IMPRESSION:  Soft tissue swelling about the ankle and proximal foot, without   subcutaneous emphysema or radiographic evidence of necrotizing fasciitis.    No acute fracture or dislocation.    < end of copied text >

## 2022-01-23 NOTE — PROGRESS NOTE ADULT - ASSESSMENT
61M with PMH of HTN, CAD/MI s/p stents, HFrEF (EF 15-20%) s/p AICD, T2DM on insulin p/w foot ulcer. he has MANJIT on ckd III with baseline cr known to be 1.5 in 2020. he also has uncontrolled DM       1- MANJIT on ckd   2- cellulitis foot   3- DM   4- HTN   5- CHF/HFrEF  6- cardiomyopathy       manjit in setting of infection  cr is improving to baseline   asix  40 mg and entresto 49/51 mg bid   strict I/O

## 2022-01-23 NOTE — PROGRESS NOTE ADULT - ASSESSMENT
Assessment  DMT2: 61y Male with DM T2 with hyperglycemia admitted with foot wound, was on insulin at home, now on basal bolus insulin, increased dose yesterday, blood sugars improving and in acceptable range, no hypoglycemias, eating meals, well-appearing, denies acute complaints.  Foot wound: on medications, monitored, asymptomatic.  HTN: On antihypertensive medications, monitored, asymptomatic.  Overweight: No strict exercise routines, neither on low calorie diet.       Cortney Flanagan MD  Cell: [phone]  Office: [phone]     Assessment  DMT2: 61y Male with DM T2 with hyperglycemia admitted with foot wound, was on insulin at home, now on basal bolus insulin, increased dose yesterday, blood sugars improving  and in acceptable range, no hypoglycemias, eating meals, well-appearing, denies acute complaints.  Foot wound: on medications, monitored, asymptomatic.  HTN: On antihypertensive medications, monitored, asymptomatic.  Overweight: No strict exercise routines, neither on low calorie diet.       Cortney Flanagan MD  Cell: [phone]  Office: [phone]

## 2022-01-23 NOTE — PROGRESS NOTE ADULT - SUBJECTIVE AND OBJECTIVE BOX
Podiatry pager #: [phone] ([place])/ [phone] ([facility])    Patient is a 61y old  Male who presents with a chief complaint of foot ulcer (22 Jan 2022 21:06)       INTERVAL HPI/OVERNIGHT EVENTS:  Patient seen and evaluated at bedside.  Pt is resting comfortable in NAD. Denies N/V/F/C.     Allergies    No Known Allergies    Intolerances        Vital Signs Last 24 Hrs  T(C): 36.6 (23 Jan 2022 05:17), Max: 36.7 (22 Jan 2022 12:40)  T(F): 97.9 (23 Jan 2022 05:17), Max: 98.1 (22 Jan 2022 12:40)  HR: 97 (23 Jan 2022 05:17) (97 - 98)  BP: 118/71 (23 Jan 2022 05:17) (106/70 - 128/73)  BP(mean): --  RR: 17 (23 Jan 2022 05:17) (17 - 18)  SpO2: 96% (23 Jan 2022 05:17) (95% - 97%)    LABS:                        10.2   7.48  )-----------( 249      ( 23 Jan 2022 07:37 )             33.4     01-23    138  |  98  |  43<H>  ----------------------------<  127<H>  3.5   |  23  |  1.51<H>    Ca    9.1      23 Jan 2022 07:43          CAPILLARY BLOOD GLUCOSE      POCT Blood Glucose.: 144 mg/dL (23 Jan 2022 08:40)  POCT Blood Glucose.: 141 mg/dL (22 Jan 2022 22:17)  POCT Blood Glucose.: 182 mg/dL (22 Jan 2022 17:30)  POCT Blood Glucose.: 282 mg/dL (22 Jan 2022 13:01)      Lower Extremity Physical Exam:  Vascular: DP/PT 2/4, B/L, CFT <3 seconds B/L, Temperature gradient warm to warm on the L and warm to cool on the R  Neuro: Epicritic sensation diminished to the level of ankle, B/L.  Musculoskeletal/Ortho: unremarkable  Skin: +3 pitting edema to L leg w/ erythema & warmth   s/p left foot partial 3rd ray resection closed (DOS 1/21): skin well coapted, sutures intact, skin flaps warm to touch, no hematoma, erythema present from forefoot to anterior ankle  left foot plantar midfoot wound to bone, fibrogranular, no purulence, no malodor    RADIOLOGY & ADDITIONAL TESTS:

## 2022-01-23 NOTE — PROGRESS NOTE ADULT - SUBJECTIVE AND OBJECTIVE BOX
Chief complaint  Patient is a 61y old  Male who presents with a chief complaint of foot ulcer (23 Jan 2022 14:46)   Review of systems  Patient awake in chair, looks comfortable, no hypoglycemic episodes.    Labs and Fingersticks  CAPILLARY BLOOD GLUCOSE      POCT Blood Glucose.: 137 mg/dL (23 Jan 2022 12:27)  POCT Blood Glucose.: 144 mg/dL (23 Jan 2022 08:40)  POCT Blood Glucose.: 141 mg/dL (22 Jan 2022 22:17)  POCT Blood Glucose.: 182 mg/dL (22 Jan 2022 17:30)      Anion Gap, Serum: 17 (01-23 @ 07:43)  Anion Gap, Serum: 17 (01-22 @ 07:14)      Calcium, Total Serum: 9.1 (01-23 @ 07:43)  Calcium, Total Serum: 9.0 (01-22 @ 07:14)          01-23    138  |  98  |  43<H>  ----------------------------<  127<H>  3.5   |  23  |  1.51<H>    Ca    9.1      23 Jan 2022 07:43                          10.2   7.48  )-----------( 249      ( 23 Jan 2022 07:37 )             33.4     Medications  MEDICATIONS  (STANDING):  ampicillin/sulbactam  IVPB 3 Gram(s) IV Intermittent every 8 hours  aspirin  chewable 81 milliGRAM(s) Oral daily  atorvastatin 80 milliGRAM(s) Oral at bedtime  clopidogrel Tablet 75 milliGRAM(s) Oral daily  dextrose 40% Gel 15 Gram(s) Oral once  dextrose 5%. 1000 milliLiter(s) (50 mL/Hr) IV Continuous <Continuous>  dextrose 5%. 1000 milliLiter(s) (100 mL/Hr) IV Continuous <Continuous>  dextrose 50% Injectable 25 Gram(s) IV Push once  dextrose 50% Injectable 12.5 Gram(s) IV Push once  dextrose 50% Injectable 25 Gram(s) IV Push once  enoxaparin Injectable 40 milliGRAM(s) SubCutaneous daily  furosemide    Tablet 40 milliGRAM(s) Oral daily  glucagon  Injectable 1 milliGRAM(s) IntraMuscular once  influenza   Vaccine 0.5 milliLiter(s) IntraMuscular once  insulin glargine Injectable (LANTUS) 67 Unit(s) SubCutaneous at bedtime  insulin lispro (ADMELOG) corrective regimen sliding scale   SubCutaneous three times a day before meals  insulin lispro (ADMELOG) corrective regimen sliding scale   SubCutaneous at bedtime  insulin lispro Injectable (ADMELOG) 14 Unit(s) SubCutaneous three times a day before meals  mupirocin 2% Ointment 1 Application(s) Topical two times a day  pantoprazole    Tablet 40 milliGRAM(s) Oral before breakfast  sacubitril 49 mG/valsartan 51 mG 1 Tablet(s) Oral two times a day  tamsulosin 0.4 milliGRAM(s) Oral at bedtime      Physical Exam  General: Patient comfortable in bed  Vital Signs Last 12 Hrs  T(F): 97.5 (01-23-22 @ 12:06), Max: 97.9 (01-23-22 @ 05:17)  HR: 100 (01-23-22 @ 12:06) (97 - 100)  BP: 97/64 (01-23-22 @ 12:06) (97/64 - 118/71)  BP(mean): --  RR: 18 (01-23-22 @ 12:06) (17 - 18)  SpO2: 97% (01-23-22 @ 12:06) (96% - 97%)  Neck: No palpable thyroid nodules.    Diagnostics               Chief complaint  Patient is a 61y old  Male who presents with a chief complaint of foot ulcer (23 Jan 2022 14:46)   Review of systems  Patient awake in chair, looks comfortable,  no hypoglycemic episodes.    Labs and Fingersticks  CAPILLARY BLOOD GLUCOSE      POCT Blood Glucose.: 137 mg/dL (23 Jan 2022 12:27)  POCT Blood Glucose.: 144 mg/dL (23 Jan 2022 08:40)  POCT Blood Glucose.: 141 mg/dL (22 Jan 2022 22:17)  POCT Blood Glucose.: 182 mg/dL (22 Jan 2022 17:30)      Anion Gap, Serum: 17 (01-23 @ 07:43)  Anion Gap, Serum: 17 (01-22 @ 07:14)      Calcium, Total Serum: 9.1 (01-23 @ 07:43)  Calcium, Total Serum: 9.0 (01-22 @ 07:14)          01-23    138  |  98  |  43<H>  ----------------------------<  127<H>  3.5   |  23  |  1.51<H>    Ca    9.1      23 Jan 2022 07:43                          10.2   7.48  )-----------( 249      ( 23 Jan 2022 07:37 )             33.4     Medications  MEDICATIONS  (STANDING):  ampicillin/sulbactam  IVPB 3 Gram(s) IV Intermittent every 8 hours  aspirin  chewable 81 milliGRAM(s) Oral daily  atorvastatin 80 milliGRAM(s) Oral at bedtime  clopidogrel Tablet 75 milliGRAM(s) Oral daily  dextrose 40% Gel 15 Gram(s) Oral once  dextrose 5%. 1000 milliLiter(s) (50 mL/Hr) IV Continuous <Continuous>  dextrose 5%. 1000 milliLiter(s) (100 mL/Hr) IV Continuous <Continuous>  dextrose 50% Injectable 25 Gram(s) IV Push once  dextrose 50% Injectable 12.5 Gram(s) IV Push once  dextrose 50% Injectable 25 Gram(s) IV Push once  enoxaparin Injectable 40 milliGRAM(s) SubCutaneous daily  furosemide    Tablet 40 milliGRAM(s) Oral daily  glucagon  Injectable 1 milliGRAM(s) IntraMuscular once  influenza   Vaccine 0.5 milliLiter(s) IntraMuscular once  insulin glargine Injectable (LANTUS) 67 Unit(s) SubCutaneous at bedtime  insulin lispro (ADMELOG) corrective regimen sliding scale   SubCutaneous three times a day before meals  insulin lispro (ADMELOG) corrective regimen sliding scale   SubCutaneous at bedtime  insulin lispro Injectable (ADMELOG) 14 Unit(s) SubCutaneous three times a day before meals  mupirocin 2% Ointment 1 Application(s) Topical two times a day  pantoprazole    Tablet 40 milliGRAM(s) Oral before breakfast  sacubitril 49 mG/valsartan 51 mG 1 Tablet(s) Oral two times a day  tamsulosin 0.4 milliGRAM(s) Oral at bedtime      Physical Exam  General: Patient comfortable in bed  Vital Signs Last 12 Hrs  T(F): 97.5 (01-23-22 @ 12:06), Max: 97.9 (01-23-22 @ 05:17)  HR: 100 (01-23-22 @ 12:06) (97 - 100)  BP: 97/64 (01-23-22 @ 12:06) (97/64 - 118/71)  BP(mean): --  RR: 18 (01-23-22 @ 12:06) (17 - 18)  SpO2: 97% (01-23-22 @ 12:06) (96% - 97%)  Neck: No palpable thyroid nodules.    Diagnostics

## 2022-01-23 NOTE — PROGRESS NOTE ADULT - ASSESSMENT
62yo M s/p left foot partial 3rd ray resection (DOS 1/21)  - pt seen and evaluated  - afebrile, no leukocytosis  - left foot surgical incision site well coapted w/ sutures intact, no hematoma, skin flaps warm to touch, erythema from forefoot to anterior ankle; left foot plantar midfoot wound to bone, fibrogranular wound bed, no purulence, no malodor; LLE pitting edema to tibial tuberosity w/ mild erythema  - low concern for residual bone infection  - low concern for viability  - Clean bone culture preliminary w/ no growth  - stable for discharge from pod standpoint pending final ID recs for abx, would recommend PO abx for soft tissue coverage  - instructions for discharge in provider note under follow up  - discussed w/ attending

## 2022-01-24 LAB
ANION GAP SERPL CALC-SCNC: 14 MMOL/L — SIGNIFICANT CHANGE UP (ref 5–17)
BUN SERPL-MCNC: 39 MG/DL — HIGH (ref 7–23)
CALCIUM SERPL-MCNC: 8.9 MG/DL — SIGNIFICANT CHANGE UP (ref 8.4–10.5)
CHLORIDE SERPL-SCNC: 100 MMOL/L — SIGNIFICANT CHANGE UP (ref 96–108)
CO2 SERPL-SCNC: 23 MMOL/L — SIGNIFICANT CHANGE UP (ref 22–31)
CREAT SERPL-MCNC: 1.58 MG/DL — HIGH (ref 0.5–1.3)
GLUCOSE BLDC GLUCOMTR-MCNC: 135 MG/DL — HIGH (ref 70–99)
GLUCOSE BLDC GLUCOMTR-MCNC: 139 MG/DL — HIGH (ref 70–99)
GLUCOSE BLDC GLUCOMTR-MCNC: 150 MG/DL — HIGH (ref 70–99)
GLUCOSE BLDC GLUCOMTR-MCNC: 187 MG/DL — HIGH (ref 70–99)
GLUCOSE BLDC GLUCOMTR-MCNC: 80 MG/DL — SIGNIFICANT CHANGE UP (ref 70–99)
GLUCOSE SERPL-MCNC: 131 MG/DL — HIGH (ref 70–99)
POTASSIUM SERPL-MCNC: 3.7 MMOL/L — SIGNIFICANT CHANGE UP (ref 3.5–5.3)
POTASSIUM SERPL-SCNC: 3.7 MMOL/L — SIGNIFICANT CHANGE UP (ref 3.5–5.3)
SARS-COV-2 RNA SPEC QL NAA+PROBE: SIGNIFICANT CHANGE UP
SODIUM SERPL-SCNC: 137 MMOL/L — SIGNIFICANT CHANGE UP (ref 135–145)

## 2022-01-24 PROCEDURE — 99232 SBSQ HOSP IP/OBS MODERATE 35: CPT

## 2022-01-24 RX ORDER — FUROSEMIDE 40 MG
20 TABLET ORAL ONCE
Refills: 0 | Status: COMPLETED | OUTPATIENT
Start: 2022-01-24 | End: 2022-01-24

## 2022-01-24 RX ORDER — FUROSEMIDE 40 MG
1 TABLET ORAL
Qty: 0 | Refills: 0 | DISCHARGE

## 2022-01-24 RX ADMIN — SACUBITRIL AND VALSARTAN 1 TABLET(S): 24; 26 TABLET, FILM COATED ORAL at 05:56

## 2022-01-24 RX ADMIN — Medication 40 MILLIGRAM(S): at 05:55

## 2022-01-24 RX ADMIN — CLOPIDOGREL BISULFATE 75 MILLIGRAM(S): 75 TABLET, FILM COATED ORAL at 11:51

## 2022-01-24 RX ADMIN — SACUBITRIL AND VALSARTAN 1 TABLET(S): 24; 26 TABLET, FILM COATED ORAL at 18:35

## 2022-01-24 RX ADMIN — ATORVASTATIN CALCIUM 80 MILLIGRAM(S): 80 TABLET, FILM COATED ORAL at 22:00

## 2022-01-24 RX ADMIN — MUPIROCIN 1 APPLICATION(S): 20 OINTMENT TOPICAL at 05:56

## 2022-01-24 RX ADMIN — INSULIN GLARGINE 67 UNIT(S): 100 INJECTION, SOLUTION SUBCUTANEOUS at 21:54

## 2022-01-24 RX ADMIN — PANTOPRAZOLE SODIUM 40 MILLIGRAM(S): 20 TABLET, DELAYED RELEASE ORAL at 06:06

## 2022-01-24 RX ADMIN — Medication 81 MILLIGRAM(S): at 11:51

## 2022-01-24 RX ADMIN — TAMSULOSIN HYDROCHLORIDE 0.4 MILLIGRAM(S): 0.4 CAPSULE ORAL at 22:00

## 2022-01-24 RX ADMIN — MUPIROCIN 1 APPLICATION(S): 20 OINTMENT TOPICAL at 18:35

## 2022-01-24 RX ADMIN — AMPICILLIN SODIUM AND SULBACTAM SODIUM 200 GRAM(S): 250; 125 INJECTION, POWDER, FOR SUSPENSION INTRAMUSCULAR; INTRAVENOUS at 21:54

## 2022-01-24 RX ADMIN — AMPICILLIN SODIUM AND SULBACTAM SODIUM 200 GRAM(S): 250; 125 INJECTION, POWDER, FOR SUSPENSION INTRAMUSCULAR; INTRAVENOUS at 05:55

## 2022-01-24 RX ADMIN — Medication 14 UNIT(S): at 08:42

## 2022-01-24 RX ADMIN — Medication 20 MILLIGRAM(S): at 18:34

## 2022-01-24 RX ADMIN — Medication 14 UNIT(S): at 13:15

## 2022-01-24 RX ADMIN — AMPICILLIN SODIUM AND SULBACTAM SODIUM 200 GRAM(S): 250; 125 INJECTION, POWDER, FOR SUSPENSION INTRAMUSCULAR; INTRAVENOUS at 14:44

## 2022-01-24 NOTE — PROGRESS NOTE ADULT - ASSESSMENT
61M with PMH of HTN, CAD/MI s/p stents, HFrEF (EF 15-20%) s/p AICD, T2DM on insulin p/w foot ulcer with foot cellulitis, diabetic foot infection, leukocytosis, sepsis     Sam Orozco  Attending Physician   Division of Infectious Disease  Pager #[phone]  Available on [employer] Teams also  After 5pm/weekend or no response, call #[phone]

## 2022-01-24 NOTE — PROGRESS NOTE ADULT - PROBLEM SELECTOR PLAN 6
BP currently soft, with SBP ~90s   likely 2/2 sepsis   holding all home antiHTN and diuretics  - resume as tolerated   routine BP monitoring
resume home cardiac meds
BP currently soft, with SBP ~90s   likely 2/2 sepsis   holding all home antiHTN and diuretics  - resume as tolerated   routine BP monitoring

## 2022-01-24 NOTE — PROGRESS NOTE ADULT - SUBJECTIVE AND OBJECTIVE BOX
Podiatry pager #: [phone] ([place])/ [phone] ([facility])    Patient is a 61y old  Male who presents with a chief complaint of foot ulcer (23 Jan 2022 17:41)       INTERVAL HPI/OVERNIGHT EVENTS:  Patient seen and evaluated at bedside.  Pt is resting comfortable in NAD. Denies N/V/F/C.     Allergies    No Known Allergies    Intolerances        Vital Signs Last 24 Hrs  T(C): 37 (24 Jan 2022 05:52), Max: 37 (24 Jan 2022 05:52)  T(F): 98.6 (24 Jan 2022 05:52), Max: 98.6 (24 Jan 2022 05:52)  HR: 98 (24 Jan 2022 05:52) (98 - 100)  BP: 117/78 (24 Jan 2022 05:52) (97/64 - 117/78)  BP(mean): --  RR: 18 (24 Jan 2022 05:52) (17 - 18)  SpO2: 95% (24 Jan 2022 05:52) (93% - 97%)    LABS:                        10.2   7.48  )-----------( 249      ( 23 Jan 2022 07:37 )             33.4     01-24    137  |  100  |  39<H>  ----------------------------<  131<H>  3.7   |  23  |  1.58<H>    Ca    8.9      24 Jan 2022 07:15          CAPILLARY BLOOD GLUCOSE      POCT Blood Glucose.: 135 mg/dL (24 Jan 2022 08:21)  POCT Blood Glucose.: 117 mg/dL (23 Jan 2022 22:10)  POCT Blood Glucose.: 107 mg/dL (23 Jan 2022 17:43)  POCT Blood Glucose.: 137 mg/dL (23 Jan 2022 12:27)      Lower Extremity Physical Exam:  Vascular: DP/PT 2/4, B/L, CFT <3 seconds B/L, Temperature gradient warm to warm on the L and warm to cool on the R  Neuro: Epicritic sensation diminished to the level of ankle, B/L.  Musculoskeletal/Ortho: unremarkable  Skin: improved edema to L leg   s/p left foot partial 3rd ray resection closed (DOS 1/21): skin well coapted, sutures intact, skin flaps warm to touch, no hematoma, improving erythema present from forefoot to anterior ankle, significant strikethrough on dressing   left foot plantar midfoot wound to bone, fibrogranular, no purulence, no malodor    RADIOLOGY & ADDITIONAL TESTS:

## 2022-01-24 NOTE — PROGRESS NOTE ADULT - PROBLEM SELECTOR PROBLEM 5
Type 2 diabetes mellitus treated with insulin

## 2022-01-24 NOTE — PROGRESS NOTE ADULT - ASSESSMENT
61M with PMH of HTN, CAD/MI s/p stents, HFrEF (EF 15-20%) s/p AICD, T2DM on insulin p/w foot ulcer. he has MANJIT on ckd III with baseline cr known to be 1.5 in 2020. he also has uncontrolled DM       1- MANJIT on ckd   2- cellulitis foot   3- DM   4- HTN   5- CHF/HFrEF  6- cardiomyopathy       manjit in setting of infection  cr is  baseline   lasix 40 mg iv today and then in am resume   Lasix  40 mg po daily  and entresto 49/51 mg bid   strict I/O

## 2022-01-24 NOTE — PROGRESS NOTE ADULT - SUBJECTIVE AND OBJECTIVE BOX
[facility]   [phone]  RENAL FOLLOW UP NOTE  --------------------------------------------------------------------------------  Chief Complaint:    24 hour events/subjective:    seen earlier   states feels better overall but still with c/o edema in leg     PAST HISTORY  --------------------------------------------------------------------------------  No significant changes to PMH, PSH, FHx, SHx, unless otherwise noted    ALLERGIES & MEDICATIONS  --------------------------------------------------------------------------------  Allergies    No Known Allergies    Intolerances      Standing Inpatient Medications  ampicillin/sulbactam  IVPB 3 Gram(s) IV Intermittent every 8 hours  aspirin  chewable 81 milliGRAM(s) Oral daily  atorvastatin 80 milliGRAM(s) Oral at bedtime  clopidogrel Tablet 75 milliGRAM(s) Oral daily  dextrose 40% Gel 15 Gram(s) Oral once  dextrose 5%. 1000 milliLiter(s) IV Continuous <Continuous>  dextrose 5%. 1000 milliLiter(s) IV Continuous <Continuous>  dextrose 50% Injectable 25 Gram(s) IV Push once  dextrose 50% Injectable 12.5 Gram(s) IV Push once  dextrose 50% Injectable 25 Gram(s) IV Push once  enoxaparin Injectable 40 milliGRAM(s) SubCutaneous daily  furosemide    Tablet 40 milliGRAM(s) Oral daily  glucagon  Injectable 1 milliGRAM(s) IntraMuscular once  influenza   Vaccine 0.5 milliLiter(s) IntraMuscular once  insulin glargine Injectable (LANTUS) 67 Unit(s) SubCutaneous at bedtime  insulin lispro (ADMELOG) corrective regimen sliding scale   SubCutaneous three times a day before meals  insulin lispro (ADMELOG) corrective regimen sliding scale   SubCutaneous at bedtime  insulin lispro Injectable (ADMELOG) 14 Unit(s) SubCutaneous three times a day before meals  mupirocin 2% Ointment 1 Application(s) Topical two times a day  pantoprazole    Tablet 40 milliGRAM(s) Oral before breakfast  sacubitril 49 mG/valsartan 51 mG 1 Tablet(s) Oral two times a day  tamsulosin 0.4 milliGRAM(s) Oral at bedtime    PRN Inpatient Medications  acetaminophen     Tablet .. 650 milliGRAM(s) Oral every 6 hours PRN  ALPRAZolam 0.25 milliGRAM(s) Oral two times a day PRN  aluminum hydroxide/magnesium hydroxide/simethicone Suspension 30 milliLiter(s) Oral every 4 hours PRN  melatonin 3 milliGRAM(s) Oral at bedtime PRN  ondansetron Injectable 4 milliGRAM(s) IV Push every 8 hours PRN      REVIEW OF SYSTEMS  --------------------------------------------------------------------------------    Gen: denies fevers/chills,  CVS: denies chest pain/palpitations  Resp: denies SOB/Cough  GI: Denies N/V/Abd pain  : Denies dysuria/oliguria/hematuria      VITALS/PHYSICAL EXAM  --------------------------------------------------------------------------------  T(C): 36.8 (01-24-22 @ 20:25), Max: 37 (01-24-22 @ 05:52)  HR: 100 (01-24-22 @ 20:25) (98 - 103)  BP: 114/70 (01-24-22 @ 20:25) (102/66 - 117/78)  RR: 18 (01-24-22 @ 20:25) (17 - 18)  SpO2: 92% (01-24-22 @ 20:25) (92% - 97%)  Wt(kg): --        01-23-22 @ 07:01  -  01-24-22 @ 07:00  --------------------------------------------------------  IN: 1540 mL / OUT: 0 mL / NET: 1540 mL    01-24-22 @ 07:01  -  01-24-22 @ 21:43  --------------------------------------------------------  IN: 600 mL / OUT: 0 mL / NET: 600 mL      Physical Exam:  	      Gen: Non toxic comfortable appearing   	no jvd  	Pulm: decrease bs  no rales or ronchi or wheezing  	CV: RRR, S1S2; no rub  	Abd: +BS, soft, nontender/nondistended  	: No suprapubic tenderness  	UE: Warm, no cyanosis  no clubbing,  trace RLE  edema  	LE: Warm, left leg edema 2- ; rle no edema left foot dressing   	Neuro: alert and oriented. speech coherent       LABS/STUDIES  --------------------------------------------------------------------------------              10.2   7.48  >-----------<  249      [01-23-22 @ 07:37]              33.4     137  |  100  |  39  ----------------------------<  131      [01-24-22 @ 07:15]  3.7   |  23  |  1.58        Ca     8.9     [01-24-22 @ 07:15]            Creatinine Trend:  SCr 1.58 [01-24 @ 07:15]  SCr 1.51 [01-23 @ 07:43]  SCr 1.77 [01-22 @ 07:14]  SCr 1.90 [01-21 @ 07:00]  SCr 2.07 [01-20 @ 06:52]              Urinalysis - [01-19-22 @ 06:25]      Color Light Yellow / Appearance Clear / SG 1.014 / pH 5.5      Gluc Negative / Ketone Negative  / Bili Negative / Urobili Negative       Blood Negative / Protein 30 mg/dL / Leuk Est Negative / Nitrite Negative      RBC 2 / WBC 1 / Hyaline 4 / Gran  / Sq Epi  / Non Sq Epi 0 / Bacteria Negative    Urine Creatinine 137      [01-19-22 @ 06:25]  Urine Protein 26      [01-19-22 @ 06:25]    HbA1c 8.9      [12-16-19 @ 08:15]

## 2022-01-24 NOTE — PROGRESS NOTE ADULT - ASSESSMENT
Assessment  DMT2: 61y Male with DM T2 with hyperglycemia admitted with foot wound, was on insulin at home, now on basal bolus insulin, blood sugars improved and trending within acceptable range, no hypoglycemias, eating meals, well-appearing, planning DC.  Foot wound: on medications, monitored, asymptomatic.  HTN: On antihypertensive medications, monitored, asymptomatic.  Overweight: No strict exercise routines, neither on low calorie diet.       Cortney Flanagan MD  Cell: [phone]  Office: [phone]     Assessment  DMT2: 61y Male with DM T2 with hyperglycemia admitted with foot wound, was on insulin at home, now on basal bolus insulin, blood sugars improved and trending within acceptable range, no hypoglycemias, eating meals,  well-appearing, planning DC.  Foot wound: on medications, monitored, asymptomatic.  HTN: On antihypertensive medications, monitored, asymptomatic.  Overweight: No strict exercise routines, neither on low calorie diet.       Cortney Flanagan MD  Cell: [phone]  Office: [phone]

## 2022-01-24 NOTE — PROGRESS NOTE ADULT - PROBLEM SELECTOR PLAN 8
lovenox for VTE ppx   fall, aspiration precautions

## 2022-01-24 NOTE — PROGRESS NOTE ADULT - PROBLEM SELECTOR PLAN 5
check A1c  resume home dose insulin - lantus 60units with bedtime and admelog sliding scale with meals   monitor FS ac and hs   titrate as needed  endo consulted
resume home dose insulin - lantus 60units with bedtime and admelog sliding scale with meals   monitor FS ac and hs   titrate as needed  endo following
check A1c  resume home dose insulin - lantus 60units with bedtime and admelog sliding scale with meals   monitor FS ac and hs   titrate as needed  endo following

## 2022-01-24 NOTE — PROGRESS NOTE ADULT - ASSESSMENT
62yo M s/p left foot partial 3rd ray resection (DOS 1/21)  - pt seen and evaluated  - afebrile, no leukocytosis  - left foot surgical incision site well coapted w/ sutures intact, no hematoma, skin flaps warm to touch, erythema from forefoot to anterior ankle; left foot plantar midfoot wound to bone, fibrogranular wound bed, no purulence, no malodor; improved LLE pitting edema to tibial tuberosity   - significant strikethrough on dressing   - low concern for residual bone infection  - low concern for viability  - Clean bone culture preliminary w/ no growth  - instructed patient to remain NWB to left foot as much as possible other than for using the bathroom, pt to elevate the foot as much as possible   - Rx CAM boot for LLE to be delivered by orthotist   - stable for discharge from pod standpoint pending final ID recs for abx  - instructions for discharge in provider note under follow up  - seen w/ attending

## 2022-01-24 NOTE — PROGRESS NOTE ADULT - SUBJECTIVE AND OBJECTIVE BOX
Chief complaint  Patient is a 61y old  Male who presents with a chief complaint of foot ulcer (24 Jan 2022 12:31)   Review of systems  Patient in bed, looks comfortable, no hypoglycemic episodes.    Labs and Fingersticks  CAPILLARY BLOOD GLUCOSE      POCT Blood Glucose.: 150 mg/dL (24 Jan 2022 13:10)  POCT Blood Glucose.: 187 mg/dL (24 Jan 2022 12:03)  POCT Blood Glucose.: 135 mg/dL (24 Jan 2022 08:21)  POCT Blood Glucose.: 117 mg/dL (23 Jan 2022 22:10)  POCT Blood Glucose.: 107 mg/dL (23 Jan 2022 17:43)      Anion Gap, Serum: 14 (01-24 @ 07:15)  Anion Gap, Serum: 17 (01-23 @ 07:43)      Calcium, Total Serum: 8.9 (01-24 @ 07:15)  Calcium, Total Serum: 9.1 (01-23 @ 07:43)          01-24    137  |  100  |  39<H>  ----------------------------<  131<H>  3.7   |  23  |  1.58<H>    Ca    8.9      24 Jan 2022 07:15                          10.2   7.48  )-----------( 249      ( 23 Jan 2022 07:37 )             33.4     Medications  MEDICATIONS  (STANDING):  ampicillin/sulbactam  IVPB 3 Gram(s) IV Intermittent every 8 hours  aspirin  chewable 81 milliGRAM(s) Oral daily  atorvastatin 80 milliGRAM(s) Oral at bedtime  clopidogrel Tablet 75 milliGRAM(s) Oral daily  dextrose 40% Gel 15 Gram(s) Oral once  dextrose 5%. 1000 milliLiter(s) (50 mL/Hr) IV Continuous <Continuous>  dextrose 5%. 1000 milliLiter(s) (100 mL/Hr) IV Continuous <Continuous>  dextrose 50% Injectable 25 Gram(s) IV Push once  dextrose 50% Injectable 12.5 Gram(s) IV Push once  dextrose 50% Injectable 25 Gram(s) IV Push once  enoxaparin Injectable 40 milliGRAM(s) SubCutaneous daily  furosemide    Tablet 40 milliGRAM(s) Oral daily  glucagon  Injectable 1 milliGRAM(s) IntraMuscular once  influenza   Vaccine 0.5 milliLiter(s) IntraMuscular once  insulin glargine Injectable (LANTUS) 67 Unit(s) SubCutaneous at bedtime  insulin lispro (ADMELOG) corrective regimen sliding scale   SubCutaneous three times a day before meals  insulin lispro (ADMELOG) corrective regimen sliding scale   SubCutaneous at bedtime  insulin lispro Injectable (ADMELOG) 14 Unit(s) SubCutaneous three times a day before meals  mupirocin 2% Ointment 1 Application(s) Topical two times a day  pantoprazole    Tablet 40 milliGRAM(s) Oral before breakfast  sacubitril 49 mG/valsartan 51 mG 1 Tablet(s) Oral two times a day  tamsulosin 0.4 milliGRAM(s) Oral at bedtime      Physical Exam  General: Patient comfortable in bed  Vital Signs Last 12 Hrs  T(F): 98.4 (01-24-22 @ 14:03), Max: 98.6 (01-24-22 @ 05:52)  HR: 103 (01-24-22 @ 14:03) (98 - 103)  BP: 102/66 (01-24-22 @ 14:03) (102/66 - 117/78)  BP(mean): --  RR: 18 (01-24-22 @ 14:03) (18 - 18)  SpO2: 97% (01-24-22 @ 14:03) (95% - 97%)  Neck: No palpable thyroid nodules.  CVS: S1S2, No murmurs  Respiratory: No wheezing, no crepitations  GI: Abdomen soft, bowel sounds positive  Musculoskeletal:  edema lower extremities.   Skin: No skin rashes, no ecchymosis    Diagnostics             Chief complaint  Patient is a 61y old  Male who presents with a chief complaint of foot ulcer (24 Jan 2022 12:31)   Review of systems  Patient in bed, looks comfortable, no hypoglycemic episodes.    Labs and Fingersticks  CAPILLARY BLOOD GLUCOSE      POCT Blood Glucose.: 150 mg/dL (24 Jan 2022 13:10)  POCT Blood Glucose.: 187 mg/dL (24 Jan 2022 12:03)  POCT Blood Glucose.: 135 mg/dL (24 Jan 2022 08:21)  POCT Blood Glucose.: 117 mg/dL (23 Jan 2022 22:10)  POCT Blood Glucose.: 107 mg/dL (23 Jan 2022 17:43)      Anion Gap, Serum: 14 (01-24 @ 07:15)  Anion Gap, Serum: 17 (01-23 @ 07:43)      Calcium, Total Serum: 8.9 (01-24 @ 07:15)  Calcium, Total Serum: 9.1 (01-23 @ 07:43)          01-24    137  |  100  |  39<H>  ----------------------------<  131<H>  3.7   |  23  |  1.58<H>    Ca    8.9      24 Jan 2022 07:15                          10.2   7.48  )-----------( 249      ( 23 Jan 2022 07:37 )             33.4     Medications  MEDICATIONS  (STANDING):  ampicillin/sulbactam  IVPB 3 Gram(s) IV Intermittent every 8 hours  aspirin  chewable 81 milliGRAM(s) Oral daily  atorvastatin 80 milliGRAM(s) Oral at bedtime  clopidogrel Tablet 75 milliGRAM(s) Oral daily  dextrose 40% Gel 15 Gram(s) Oral once  dextrose 5%. 1000 milliLiter(s) (50 mL/Hr) IV Continuous <Continuous>  dextrose 5%. 1000 milliLiter(s) (100 mL/Hr) IV Continuous <Continuous>  dextrose 50% Injectable 25 Gram(s) IV Push once  dextrose 50% Injectable 12.5 Gram(s) IV Push once  dextrose 50% Injectable 25 Gram(s) IV Push once  enoxaparin Injectable 40 milliGRAM(s) SubCutaneous daily  furosemide    Tablet 40 milliGRAM(s) Oral daily  glucagon  Injectable 1 milliGRAM(s) IntraMuscular once  influenza   Vaccine 0.5 milliLiter(s) IntraMuscular once  insulin glargine Injectable (LANTUS) 67 Unit(s) SubCutaneous at bedtime  insulin lispro (ADMELOG) corrective regimen sliding scale   SubCutaneous three times a day before meals  insulin lispro (ADMELOG) corrective regimen sliding scale   SubCutaneous at bedtime  insulin lispro Injectable (ADMELOG) 14 Unit(s) SubCutaneous three times a day before meals  mupirocin 2% Ointment 1 Application(s) Topical two times a day  pantoprazole    Tablet 40 milliGRAM(s) Oral before breakfast  sacubitril 49 mG/valsartan 51 mG 1 Tablet(s) Oral two times a day  tamsulosin 0.4 milliGRAM(s) Oral at bedtime      Physical Exam  General: Patient comfortable in bed  Vital Signs Last 12 Hrs  T(F): 98.4 (01-24-22 @ 14:03), Max: 98.6 (01-24-22 @ 05:52)  HR: 103 (01-24-22 @ 14:03) (98 - 103)  BP: 102/66 (01-24-22 @ 14:03) (102/66 - 117/78)  BP(mean): --  RR: 18 (01-24-22 @ 14:03) (18 - 18)  SpO2: 97% (01-24-22 @ 14:03) (95% - 97%)  Neck: No palpable thyroid nodules.  CVS: S1S2, No murmurs  Respiratory: No wheezing, no crepitations  GI: Abdomen soft, bowel sounds positive  Musculoskeletal:  edema lower extremities.   Skin: No skin rashes, no ecchymosis    Diagnostics

## 2022-01-24 NOTE — PROGRESS NOTE ADULT - SUBJECTIVE AND OBJECTIVE BOX
SEVERIANO MARTINEZ  61y Male  MRN:[id number]    Patient is a 61y old  Male who presents with a chief complaint of foot ulcer (18 Jan 2022 12:11)    HPI:  61M with PMH of HTN, CAD/MI s/p stents, HFrEF (EF 15-20%) s/p AICD, T2DM on insulin p/w foot ulcer. Pt had bunionectomy of L foot approx 5 weeks ago with poor wound healing since that time. In the past 2 weeks had started noticing increasing pain, swelling and erythema at site of the wound and was started on levaquin in 1/4/21 x 10 days without any improvement in symptoms. Was started on clindamycin 2-3 days ago without any improvement. Pt states he has 5/10 intermittent sharp pain at site of the wound and initially had only localized swelling and erythema; however, in past 24 hours started noticing the swelling and erythema moving proximally, now with swelling to his knees and erythema to mid calf. Pain has also worsened but remains localized, non radiating but worse with weight bearing or ambulation; has been taking Tylenol or Advil for pain with some relief. Endorses minimal clear discharge from wound. Went to see podiatrist today who referred him to ED for further evaluation. Denies any fevers, +chills ongoing x weeks, +1 episode of nausea with vomiting this morning, no abd pain, no CP, SOB, no GI or  symptoms.  (17 Jan 2022 23:43)      Patient seen and evaluated at bedside. No acute events overnight except as noted.    Interval HPI: no acute events o/n       PAST MEDICAL & SURGICAL HISTORY:  Stented coronary artery    Diabetes    AICD (automatic cardioverter/defibrillator) present    Hypertension    Heart failure with reduced ejection fraction    History of ischemic cardiomyopathy    History of COPD    H/O gastroesophageal reflux (GERD)    H/O vasectomy  20 yrs ago (2000)        REVIEW OF SYSTEMS:  as per hpi     VITALS:   Vital Signs Last 24 Hrs  T(C): 37 (24 Jan 2022 05:52), Max: 37 (24 Jan 2022 05:52)  T(F): 98.6 (24 Jan 2022 05:52), Max: 98.6 (24 Jan 2022 05:52)  HR: 98 (24 Jan 2022 05:52) (98 - 100)  BP: 117/78 (24 Jan 2022 05:52) (102/69 - 117/78)  BP(mean): --  RR: 18 (24 Jan 2022 05:52) (17 - 18)  SpO2: 95% (24 Jan 2022 05:52) (93% - 95%)    PHYSICAL EXAM:  GENERAL: NAD, well-developed  HEAD:  Atraumatic, Normocephalic  EYES: EOMI, PERRLA, conjunctiva and sclera clear  NECK: Supple, No JVD  CHEST/LUNG: Clear to auscultation bilaterally; No wheeze  HEART: S1, S2; No murmurs, rubs, or gallops  ABDOMEN: Soft, Nontender, Nondistended; Bowel sounds present  EXTREMITIES:  2+ Peripheral Pulses, No clubbing, cyanosis, or edema. LLE dressing   PSYCH: Normal affect  NEUROLOGY: AAOX3; non-focal  SKIN: No rashes or lesions    Consultant(s) Notes Reviewed:  [x ] YES  [ ] NO  Care Discussed with Consultants/Other Providers [ x] YES  [ ] NO    MEDS:   MEDICATIONS  (STANDING):  ampicillin/sulbactam  IVPB 3 Gram(s) IV Intermittent every 8 hours  aspirin  chewable 81 milliGRAM(s) Oral daily  atorvastatin 80 milliGRAM(s) Oral at bedtime  clopidogrel Tablet 75 milliGRAM(s) Oral daily  dextrose 40% Gel 15 Gram(s) Oral once  dextrose 5%. 1000 milliLiter(s) (50 mL/Hr) IV Continuous <Continuous>  dextrose 5%. 1000 milliLiter(s) (100 mL/Hr) IV Continuous <Continuous>  dextrose 50% Injectable 25 Gram(s) IV Push once  dextrose 50% Injectable 12.5 Gram(s) IV Push once  dextrose 50% Injectable 25 Gram(s) IV Push once  enoxaparin Injectable 40 milliGRAM(s) SubCutaneous daily  furosemide    Tablet 40 milliGRAM(s) Oral daily  glucagon  Injectable 1 milliGRAM(s) IntraMuscular once  influenza   Vaccine 0.5 milliLiter(s) IntraMuscular once  insulin glargine Injectable (LANTUS) 67 Unit(s) SubCutaneous at bedtime  insulin lispro (ADMELOG) corrective regimen sliding scale   SubCutaneous three times a day before meals  insulin lispro (ADMELOG) corrective regimen sliding scale   SubCutaneous at bedtime  insulin lispro Injectable (ADMELOG) 14 Unit(s) SubCutaneous three times a day before meals  mupirocin 2% Ointment 1 Application(s) Topical two times a day  pantoprazole    Tablet 40 milliGRAM(s) Oral before breakfast  sacubitril 49 mG/valsartan 51 mG 1 Tablet(s) Oral two times a day  tamsulosin 0.4 milliGRAM(s) Oral at bedtime    MEDICATIONS  (PRN):  acetaminophen     Tablet .. 650 milliGRAM(s) Oral every 6 hours PRN Temp greater or equal to 38C (100.4F), Mild Pain (1 - 3)  ALPRAZolam 0.25 milliGRAM(s) Oral two times a day PRN anxiety  aluminum hydroxide/magnesium hydroxide/simethicone Suspension 30 milliLiter(s) Oral every 4 hours PRN Dyspepsia  melatonin 3 milliGRAM(s) Oral at bedtime PRN Insomnia  ondansetron Injectable 4 milliGRAM(s) IV Push every 8 hours PRN Nausea and/or Vomiting      ALLERGIES:  No Known Allergies      LABS:                                                10.2   7.48  )-----------( 249      ( 23 Jan 2022 07:37 )             33.4   01-24    137  |  100  |  39<H>  ----------------------------<  131<H>  3.7   |  23  |  1.58<H>    Ca    8.9      24 Jan 2022 07:15              < from: MR Foot w/wo IV Cont, Left (01.19.22 @ 17:06) >  IMPRESSION:    Plantar wound at the level of the third metatarsal head with   osteomyelitis within the metatarsal head as well as in the proximal   phalanx of the third digit.    --- End of Report ---    < end of copied text >     < from: [facility] Duplex Lower Ext Vein Scan, Left (01.17.22 @ 20:05) >    IMPRESSION:  No evidence of left lower extremity deep venous thrombosis.      < end of copied text >  < from: Xray Tibia + Fibula 2 Views, Left (01.17.22 @ 15:46) >  IMPRESSION:  Soft tissue swelling about the ankle and proximal foot, without   subcutaneous emphysema or radiographic evidence of necrotizing fasciitis.    No acute fracture or dislocation.    < end of copied text >

## 2022-01-24 NOTE — PROGRESS NOTE ADULT - SUBJECTIVE AND OBJECTIVE BOX
SEVERIANO MARTINEZ 61y [id number]    Patient is a 61y old  Male who presents with a chief complaint of foot ulcer (24 Jan 2022 08:46)      Follow Up/CC:  ID following for    Interval History/ROS:    Allergies    No Known Allergies    Intolerances        ANTIMICROBIALS:  ampicillin/sulbactam  IVPB 3 every 8 hours      MEDICATIONS  (STANDING):  ampicillin/sulbactam  IVPB 3 Gram(s) IV Intermittent every 8 hours  aspirin  chewable 81 milliGRAM(s) Oral daily  atorvastatin 80 milliGRAM(s) Oral at bedtime  clopidogrel Tablet 75 milliGRAM(s) Oral daily  dextrose 40% Gel 15 Gram(s) Oral once  dextrose 5%. 1000 milliLiter(s) (50 mL/Hr) IV Continuous <Continuous>  dextrose 5%. 1000 milliLiter(s) (100 mL/Hr) IV Continuous <Continuous>  dextrose 50% Injectable 25 Gram(s) IV Push once  dextrose 50% Injectable 12.5 Gram(s) IV Push once  dextrose 50% Injectable 25 Gram(s) IV Push once  enoxaparin Injectable 40 milliGRAM(s) SubCutaneous daily  furosemide    Tablet 40 milliGRAM(s) Oral daily  glucagon  Injectable 1 milliGRAM(s) IntraMuscular once  influenza   Vaccine 0.5 milliLiter(s) IntraMuscular once  insulin glargine Injectable (LANTUS) 67 Unit(s) SubCutaneous at bedtime  insulin lispro (ADMELOG) corrective regimen sliding scale   SubCutaneous three times a day before meals  insulin lispro (ADMELOG) corrective regimen sliding scale   SubCutaneous at bedtime  insulin lispro Injectable (ADMELOG) 14 Unit(s) SubCutaneous three times a day before meals  mupirocin 2% Ointment 1 Application(s) Topical two times a day  pantoprazole    Tablet 40 milliGRAM(s) Oral before breakfast  sacubitril 49 mG/valsartan 51 mG 1 Tablet(s) Oral two times a day  tamsulosin 0.4 milliGRAM(s) Oral at bedtime    MEDICATIONS  (PRN):  acetaminophen     Tablet .. 650 milliGRAM(s) Oral every 6 hours PRN Temp greater or equal to 38C (100.4F), Mild Pain (1 - 3)  ALPRAZolam 0.25 milliGRAM(s) Oral two times a day PRN anxiety  aluminum hydroxide/magnesium hydroxide/simethicone Suspension 30 milliLiter(s) Oral every 4 hours PRN Dyspepsia  melatonin 3 milliGRAM(s) Oral at bedtime PRN Insomnia  ondansetron Injectable 4 milliGRAM(s) IV Push every 8 hours PRN Nausea and/or Vomiting        Vital Signs Last 24 Hrs  T(C): 37 (24 Jan 2022 05:52), Max: 37 (24 Jan 2022 05:52)  T(F): 98.6 (24 Jan 2022 05:52), Max: 98.6 (24 Jan 2022 05:52)  HR: 98 (24 Jan 2022 05:52) (98 - 100)  BP: 117/78 (24 Jan 2022 05:52) (97/64 - 117/78)  BP(mean): --  RR: 18 (24 Jan 2022 05:52) (17 - 18)  SpO2: 95% (24 Jan 2022 05:52) (93% - 97%)    CBC Full  -  ( 23 Jan 2022 07:37 )  WBC Count : 7.48 K/uL  RBC Count : 4.31 M/uL  Hemoglobin : 10.2 g/dL  Hematocrit : 33.4 %  Platelet Count - Automated : 249 K/uL  Mean Cell Volume : 77.5 fl  Mean Cell Hemoglobin : 23.7 pg  Mean Cell Hemoglobin Concentration : 30.5 gm/dL  Auto Neutrophil # : x  Auto Lymphocyte # : x  Auto Monocyte # : x  Auto Eosinophil # : x  Auto Basophil # : x  Auto Neutrophil % : x  Auto Lymphocyte % : x  Auto Monocyte % : x  Auto Eosinophil % : x  Auto Basophil % : x    01-24    137  |  100  |  39<H>  ----------------------------<  131<H>  3.7   |  23  |  1.58<H>    Ca    8.9      24 Jan 2022 07:15            MICROBIOLOGY:  .Other  01-21-22   No growth at 48 hours  --  --      .Tissue  01-21-22   No growth  --    No polymorphonuclear cells seen per low power field  No organisms seen per oil power field      .Abscess left foot wound culture  01-17-22   Numerous Alpha hemolytic strep "Susceptibilities not performed"  Rare Enterococcus faecalis  Few Bacteroides thetaiotamcron group "Susceptibilities not performed"  --  Enterococcus faecalis      .Blood Blood-Peripheral  01-17-22   No Growth Final  --  --              v            RADIOLOGY     SEVERIANO MARTINEZ 61y [id number]    Patient is a 61y old  Male who presents with a chief complaint of foot ulcer (24 Jan 2022 08:46)      Follow Up/CC:  ID following for toe OM    Interval History/ROS: no fever, feels better     Allergies    No Known Allergies    Intolerances        ANTIMICROBIALS:  ampicillin/sulbactam  IVPB 3 every 8 hours      MEDICATIONS  (STANDING):  ampicillin/sulbactam  IVPB 3 Gram(s) IV Intermittent every 8 hours  aspirin  chewable 81 milliGRAM(s) Oral daily  atorvastatin 80 milliGRAM(s) Oral at bedtime  clopidogrel Tablet 75 milliGRAM(s) Oral daily  dextrose 40% Gel 15 Gram(s) Oral once  dextrose 5%. 1000 milliLiter(s) (50 mL/Hr) IV Continuous <Continuous>  dextrose 5%. 1000 milliLiter(s) (100 mL/Hr) IV Continuous <Continuous>  dextrose 50% Injectable 25 Gram(s) IV Push once  dextrose 50% Injectable 12.5 Gram(s) IV Push once  dextrose 50% Injectable 25 Gram(s) IV Push once  enoxaparin Injectable 40 milliGRAM(s) SubCutaneous daily  furosemide    Tablet 40 milliGRAM(s) Oral daily  glucagon  Injectable 1 milliGRAM(s) IntraMuscular once  influenza   Vaccine 0.5 milliLiter(s) IntraMuscular once  insulin glargine Injectable (LANTUS) 67 Unit(s) SubCutaneous at bedtime  insulin lispro (ADMELOG) corrective regimen sliding scale   SubCutaneous three times a day before meals  insulin lispro (ADMELOG) corrective regimen sliding scale   SubCutaneous at bedtime  insulin lispro Injectable (ADMELOG) 14 Unit(s) SubCutaneous three times a day before meals  mupirocin 2% Ointment 1 Application(s) Topical two times a day  pantoprazole    Tablet 40 milliGRAM(s) Oral before breakfast  sacubitril 49 mG/valsartan 51 mG 1 Tablet(s) Oral two times a day  tamsulosin 0.4 milliGRAM(s) Oral at bedtime    MEDICATIONS  (PRN):  acetaminophen     Tablet .. 650 milliGRAM(s) Oral every 6 hours PRN Temp greater or equal to 38C (100.4F), Mild Pain (1 - 3)  ALPRAZolam 0.25 milliGRAM(s) Oral two times a day PRN anxiety  aluminum hydroxide/magnesium hydroxide/simethicone Suspension 30 milliLiter(s) Oral every 4 hours PRN Dyspepsia  melatonin 3 milliGRAM(s) Oral at bedtime PRN Insomnia  ondansetron Injectable 4 milliGRAM(s) IV Push every 8 hours PRN Nausea and/or Vomiting        Vital Signs Last 24 Hrs  T(C): 37 (24 Jan 2022 05:52), Max: 37 (24 Jan 2022 05:52)  T(F): 98.6 (24 Jan 2022 05:52), Max: 98.6 (24 Jan 2022 05:52)  HR: 98 (24 Jan 2022 05:52) (98 - 100)  BP: 117/78 (24 Jan 2022 05:52) (97/64 - 117/78)  BP(mean): --  RR: 18 (24 Jan 2022 05:52) (17 - 18)  SpO2: 95% (24 Jan 2022 05:52) (93% - 97%)    CBC Full  -  ( 23 Jan 2022 07:37 )  WBC Count : 7.48 K/uL  RBC Count : 4.31 M/uL  Hemoglobin : 10.2 g/dL  Hematocrit : 33.4 %  Platelet Count - Automated : 249 K/uL  Mean Cell Volume : 77.5 fl  Mean Cell Hemoglobin : 23.7 pg  Mean Cell Hemoglobin Concentration : 30.5 gm/dL  Auto Neutrophil # : x  Auto Lymphocyte # : x  Auto Monocyte # : x  Auto Eosinophil # : x  Auto Basophil # : x  Auto Neutrophil % : x  Auto Lymphocyte % : x  Auto Monocyte % : x  Auto Eosinophil % : x  Auto Basophil % : x    01-24    137  |  100  |  39<H>  ----------------------------<  131<H>  3.7   |  23  |  1.58<H>    Ca    8.9      24 Jan 2022 07:15            MICROBIOLOGY:  .Other  01-21-22   No growth at 48 hours  --  --      .Tissue  01-21-22   No growth  --    No polymorphonuclear cells seen per low power field  No organisms seen per oil power field      .Abscess left foot wound culture  01-17-22   Numerous Alpha hemolytic strep "Susceptibilities not performed"  Rare Enterococcus faecalis  Few Bacteroides thetaiotamcron group "Susceptibilities not performed"  --  Enterococcus faecalis      .Blood Blood-Peripheral  01-17-22   No Growth Final  --  --      RADIOLOGY    < from: Xray Foot AP + Lateral + Oblique, Left (01.21.22 @ 12:53) >  IMPRESSION: There is an interval resection at the neck of the third   metatarsal. The osseous margin is sharp. Soft tissue swelling and air is   consistent with recent operative change. There are tiny dorsal and   plantar calcaneal spurs.    < end of copied text >

## 2022-01-24 NOTE — PROGRESS NOTE ADULT - PROBLEM SELECTOR PLAN 7
CAD s/p stents   no CP, no SOB  c/w ASA, plavix and statin

## 2022-01-25 ENCOUNTER — TRANSCRIPTION ENCOUNTER (OUTPATIENT)
Age: 62
End: 2022-01-25

## 2022-01-25 VITALS
SYSTOLIC BLOOD PRESSURE: 97 MMHG | RESPIRATION RATE: 18 BRPM | TEMPERATURE: 98 F | OXYGEN SATURATION: 98 % | DIASTOLIC BLOOD PRESSURE: 64 MMHG | HEART RATE: 88 BPM

## 2022-01-25 LAB
ANION GAP SERPL CALC-SCNC: 15 MMOL/L — SIGNIFICANT CHANGE UP (ref 5–17)
BUN SERPL-MCNC: 33 MG/DL — HIGH (ref 7–23)
CALCIUM SERPL-MCNC: 8.9 MG/DL — SIGNIFICANT CHANGE UP (ref 8.4–10.5)
CHLORIDE SERPL-SCNC: 100 MMOL/L — SIGNIFICANT CHANGE UP (ref 96–108)
CO2 SERPL-SCNC: 23 MMOL/L — SIGNIFICANT CHANGE UP (ref 22–31)
CREAT SERPL-MCNC: 1.43 MG/DL — HIGH (ref 0.5–1.3)
GLUCOSE BLDC GLUCOMTR-MCNC: 104 MG/DL — HIGH (ref 70–99)
GLUCOSE BLDC GLUCOMTR-MCNC: 173 MG/DL — HIGH (ref 70–99)
GLUCOSE BLDC GLUCOMTR-MCNC: 188 MG/DL — HIGH (ref 70–99)
GLUCOSE SERPL-MCNC: 102 MG/DL — HIGH (ref 70–99)
POTASSIUM SERPL-MCNC: 3.7 MMOL/L — SIGNIFICANT CHANGE UP (ref 3.5–5.3)
POTASSIUM SERPL-SCNC: 3.7 MMOL/L — SIGNIFICANT CHANGE UP (ref 3.5–5.3)
SODIUM SERPL-SCNC: 138 MMOL/L — SIGNIFICANT CHANGE UP (ref 135–145)

## 2022-01-25 PROCEDURE — 96365 THER/PROPH/DIAG IV INF INIT: CPT

## 2022-01-25 PROCEDURE — 93971 EXTREMITY STUDY: CPT

## 2022-01-25 PROCEDURE — 99232 SBSQ HOSP IP/OBS MODERATE 35: CPT

## 2022-01-25 PROCEDURE — 96375 TX/PRO/DX INJ NEW DRUG ADDON: CPT

## 2022-01-25 PROCEDURE — 83036 HEMOGLOBIN GLYCOSYLATED A1C: CPT

## 2022-01-25 PROCEDURE — 87040 BLOOD CULTURE FOR BACTERIA: CPT

## 2022-01-25 PROCEDURE — 82962 GLUCOSE BLOOD TEST: CPT

## 2022-01-25 PROCEDURE — 87077 CULTURE AEROBIC IDENTIFY: CPT

## 2022-01-25 PROCEDURE — 83605 ASSAY OF LACTIC ACID: CPT

## 2022-01-25 PROCEDURE — 85027 COMPLETE CBC AUTOMATED: CPT

## 2022-01-25 PROCEDURE — 81001 URINALYSIS AUTO W/SCOPE: CPT

## 2022-01-25 PROCEDURE — 84100 ASSAY OF PHOSPHORUS: CPT

## 2022-01-25 PROCEDURE — 71046 X-RAY EXAM CHEST 2 VIEWS: CPT

## 2022-01-25 PROCEDURE — 82435 ASSAY OF BLOOD CHLORIDE: CPT

## 2022-01-25 PROCEDURE — 85025 COMPLETE CBC W/AUTO DIFF WBC: CPT

## 2022-01-25 PROCEDURE — 84156 ASSAY OF PROTEIN URINE: CPT

## 2022-01-25 PROCEDURE — 88311 DECALCIFY TISSUE: CPT

## 2022-01-25 PROCEDURE — 86901 BLOOD TYPING SEROLOGIC RH(D): CPT

## 2022-01-25 PROCEDURE — 80048 BASIC METABOLIC PNL TOTAL CA: CPT

## 2022-01-25 PROCEDURE — 93005 ELECTROCARDIOGRAM TRACING: CPT

## 2022-01-25 PROCEDURE — 85018 HEMOGLOBIN: CPT

## 2022-01-25 PROCEDURE — 85652 RBC SED RATE AUTOMATED: CPT

## 2022-01-25 PROCEDURE — 87075 CULTR BACTERIA EXCEPT BLOOD: CPT

## 2022-01-25 PROCEDURE — 82565 ASSAY OF CREATININE: CPT

## 2022-01-25 PROCEDURE — 87186 SC STD MICRODIL/AGAR DIL: CPT

## 2022-01-25 PROCEDURE — 86850 RBC ANTIBODY SCREEN: CPT

## 2022-01-25 PROCEDURE — 86140 C-REACTIVE PROTEIN: CPT

## 2022-01-25 PROCEDURE — 84295 ASSAY OF SERUM SODIUM: CPT

## 2022-01-25 PROCEDURE — 73590 X-RAY EXAM OF LOWER LEG: CPT

## 2022-01-25 PROCEDURE — 82330 ASSAY OF CALCIUM: CPT

## 2022-01-25 PROCEDURE — 87070 CULTURE OTHR SPECIMN AEROBIC: CPT

## 2022-01-25 PROCEDURE — 82570 ASSAY OF URINE CREATININE: CPT

## 2022-01-25 PROCEDURE — 88305 TISSUE EXAM BY PATHOLOGIST: CPT

## 2022-01-25 PROCEDURE — 99285 EMERGENCY DEPT VISIT HI MDM: CPT

## 2022-01-25 PROCEDURE — 87205 SMEAR GRAM STAIN: CPT

## 2022-01-25 PROCEDURE — 36415 COLL VENOUS BLD VENIPUNCTURE: CPT

## 2022-01-25 PROCEDURE — 73630 X-RAY EXAM OF FOOT: CPT

## 2022-01-25 PROCEDURE — 82803 BLOOD GASES ANY COMBINATION: CPT

## 2022-01-25 PROCEDURE — 73720 MRI LWR EXTREMITY W/O&W/DYE: CPT

## 2022-01-25 PROCEDURE — 73610 X-RAY EXAM OF ANKLE: CPT

## 2022-01-25 PROCEDURE — 85730 THROMBOPLASTIN TIME PARTIAL: CPT

## 2022-01-25 PROCEDURE — 80053 COMPREHEN METABOLIC PANEL: CPT

## 2022-01-25 PROCEDURE — 83735 ASSAY OF MAGNESIUM: CPT

## 2022-01-25 PROCEDURE — A9585: CPT

## 2022-01-25 PROCEDURE — 86900 BLOOD TYPING SEROLOGIC ABO: CPT

## 2022-01-25 PROCEDURE — U0005: CPT

## 2022-01-25 PROCEDURE — 84132 ASSAY OF SERUM POTASSIUM: CPT

## 2022-01-25 PROCEDURE — 85610 PROTHROMBIN TIME: CPT

## 2022-01-25 PROCEDURE — U0003: CPT

## 2022-01-25 PROCEDURE — 97161 PT EVAL LOW COMPLEX 20 MIN: CPT

## 2022-01-25 PROCEDURE — 85014 HEMATOCRIT: CPT

## 2022-01-25 PROCEDURE — 82947 ASSAY GLUCOSE BLOOD QUANT: CPT

## 2022-01-25 RX ORDER — INSULIN LISPRO 100/ML
12 VIAL (ML) SUBCUTANEOUS
Refills: 0 | Status: DISCONTINUED | OUTPATIENT
Start: 2022-01-25 | End: 2022-01-25

## 2022-01-25 RX ORDER — INSULIN LISPRO 100/ML
20 VIAL (ML) SUBCUTANEOUS
Qty: 0 | Refills: 0 | DISCHARGE

## 2022-01-25 RX ORDER — FUROSEMIDE 40 MG
1 TABLET ORAL
Qty: 30 | Refills: 0
Start: 2022-01-25 | End: 2022-02-23

## 2022-01-25 RX ORDER — MUPIROCIN 20 MG/G
1 OINTMENT TOPICAL
Qty: 1 | Refills: 0
Start: 2022-01-25 | End: 2022-02-07

## 2022-01-25 RX ORDER — INSULIN GLARGINE 100 [IU]/ML
60 INJECTION, SOLUTION SUBCUTANEOUS
Qty: 0 | Refills: 0 | DISCHARGE

## 2022-01-25 RX ADMIN — Medication 1: at 13:57

## 2022-01-25 RX ADMIN — Medication 81 MILLIGRAM(S): at 12:07

## 2022-01-25 RX ADMIN — CLOPIDOGREL BISULFATE 75 MILLIGRAM(S): 75 TABLET, FILM COATED ORAL at 12:07

## 2022-01-25 RX ADMIN — MUPIROCIN 1 APPLICATION(S): 20 OINTMENT TOPICAL at 06:10

## 2022-01-25 RX ADMIN — PANTOPRAZOLE SODIUM 40 MILLIGRAM(S): 20 TABLET, DELAYED RELEASE ORAL at 06:10

## 2022-01-25 RX ADMIN — AMPICILLIN SODIUM AND SULBACTAM SODIUM 200 GRAM(S): 250; 125 INJECTION, POWDER, FOR SUSPENSION INTRAMUSCULAR; INTRAVENOUS at 06:10

## 2022-01-25 RX ADMIN — Medication 40 MILLIGRAM(S): at 06:10

## 2022-01-25 RX ADMIN — SACUBITRIL AND VALSARTAN 1 TABLET(S): 24; 26 TABLET, FILM COATED ORAL at 06:10

## 2022-01-25 RX ADMIN — ENOXAPARIN SODIUM 40 MILLIGRAM(S): 100 INJECTION SUBCUTANEOUS at 12:07

## 2022-01-25 RX ADMIN — AMPICILLIN SODIUM AND SULBACTAM SODIUM 200 GRAM(S): 250; 125 INJECTION, POWDER, FOR SUSPENSION INTRAMUSCULAR; INTRAVENOUS at 13:53

## 2022-01-25 NOTE — PROGRESS NOTE ADULT - SUBJECTIVE AND OBJECTIVE BOX
SEVERIANO MARTINEZ 61y [id number]    Patient is a 61y old  Male who presents with a chief complaint of foot ulcer (25 Jan 2022 11:24)      Follow Up/CC:  ID following for toe OM    Interval History/ROS: no fever, got his orthotic boot    Allergies    No Known Allergies    Intolerances        ANTIMICROBIALS:  ampicillin/sulbactam  IVPB 3 every 8 hours      MEDICATIONS  (STANDING):  ampicillin/sulbactam  IVPB 3 Gram(s) IV Intermittent every 8 hours  aspirin  chewable 81 milliGRAM(s) Oral daily  atorvastatin 80 milliGRAM(s) Oral at bedtime  clopidogrel Tablet 75 milliGRAM(s) Oral daily  dextrose 40% Gel 15 Gram(s) Oral once  dextrose 5%. 1000 milliLiter(s) (50 mL/Hr) IV Continuous <Continuous>  dextrose 5%. 1000 milliLiter(s) (100 mL/Hr) IV Continuous <Continuous>  dextrose 50% Injectable 25 Gram(s) IV Push once  dextrose 50% Injectable 12.5 Gram(s) IV Push once  dextrose 50% Injectable 25 Gram(s) IV Push once  enoxaparin Injectable 40 milliGRAM(s) SubCutaneous daily  furosemide    Tablet 40 milliGRAM(s) Oral daily  glucagon  Injectable 1 milliGRAM(s) IntraMuscular once  influenza   Vaccine 0.5 milliLiter(s) IntraMuscular once  insulin glargine Injectable (LANTUS) 67 Unit(s) SubCutaneous at bedtime  insulin lispro (ADMELOG) corrective regimen sliding scale   SubCutaneous three times a day before meals  insulin lispro (ADMELOG) corrective regimen sliding scale   SubCutaneous at bedtime  insulin lispro Injectable (ADMELOG) 12 Unit(s) SubCutaneous three times a day before meals  mupirocin 2% Ointment 1 Application(s) Topical two times a day  pantoprazole    Tablet 40 milliGRAM(s) Oral before breakfast  sacubitril 49 mG/valsartan 51 mG 1 Tablet(s) Oral two times a day  tamsulosin 0.4 milliGRAM(s) Oral at bedtime    MEDICATIONS  (PRN):  acetaminophen     Tablet .. 650 milliGRAM(s) Oral every 6 hours PRN Temp greater or equal to 38C (100.4F), Mild Pain (1 - 3)  ALPRAZolam 0.25 milliGRAM(s) Oral two times a day PRN anxiety  aluminum hydroxide/magnesium hydroxide/simethicone Suspension 30 milliLiter(s) Oral every 4 hours PRN Dyspepsia  melatonin 3 milliGRAM(s) Oral at bedtime PRN Insomnia  ondansetron Injectable 4 milliGRAM(s) IV Push every 8 hours PRN Nausea and/or Vomiting        Vital Signs Last 24 Hrs  T(C): 36.5 (25 Jan 2022 04:58), Max: 36.9 (24 Jan 2022 14:03)  T(F): 97.7 (25 Jan 2022 04:58), Max: 98.4 (24 Jan 2022 14:03)  HR: 99 (25 Jan 2022 04:58) (99 - 103)  BP: 117/79 (25 Jan 2022 04:58) (102/66 - 117/79)  BP(mean): --  RR: 18 (25 Jan 2022 04:58) (18 - 18)  SpO2: 96% (25 Jan 2022 04:58) (92% - 97%)      01-25    138  |  100  |  33<H>  ----------------------------<  102<H>  3.7   |  23  |  1.43<H>    Ca    8.9      25 Jan 2022 07:56            MICROBIOLOGY:  .Other  01-21-22   No growth at 48 hours  --  --      .Tissue  01-21-22   No growth  --    No polymorphonuclear cells seen per low power field  No organisms seen per oil power field      .Abscess left foot wound culture  01-17-22   Numerous Alpha hemolytic strep "Susceptibilities not performed"  Rare Enterococcus faecalis  Few Bacteroides thetaiotamcron group "Susceptibilities not performed"  --  Enterococcus faecalis      .Blood Blood-Peripheral  01-17-22   No Growth Final  --  --      RADIOLOGY    < from: Xray Foot AP + Lateral + Oblique, Left (01.21.22 @ 12:53) >  IMPRESSION: There is an interval resection at the neck of the third   metatarsal. The osseous margin is sharp. Soft tissue swelling and air is   consistent with recent operative change. There are tiny dorsal and   plantar calcaneal spurs.    < end of copied text >

## 2022-01-25 NOTE — PROGRESS NOTE ADULT - PSYCHIATRIC DETAILS
Pt would like to hold off on scheduling these appts at this time due to her schedule.  Lizabeth is due for:  6 month follow up with Dr. Patterson  Colon Cancer Screening  Cervical Cancer Screening    Pt will call office to schedule.  
normal behavior
normal affect/normal behavior
normal behavior

## 2022-01-25 NOTE — CHART NOTE - NSCHARTNOTEFT_GEN_A_CORE
Cam boot ordered from podiatry resident   Boot sized and adjusted to patient dimensions  Pt instructed to don and doff  Follow up if needed        Demarcus RIVERO  [phone]

## 2022-01-25 NOTE — DISCHARGE NOTE NURSING/CASE MANAGEMENT/SOCIAL WORK - PATIENT PORTAL LINK FT
You can access the FollowMyHealth Patient Portal offered by [facility] by registering at the following website: [email or web address]. By joining [employer] [facility]’s FollowMyHealth portal, you will also be able to view your health information using other applications (apps) compatible with our system.

## 2022-01-25 NOTE — PROGRESS NOTE ADULT - PROBLEM SELECTOR PROBLEM 4
HTN (hypertension)
HTN (hypertension)
Chronic systolic heart failure
HTN (hypertension)
HTN (hypertension)
Chronic systolic heart failure
HTN (hypertension)
Leukocytosis
Chronic systolic heart failure
Leukocytosis

## 2022-01-25 NOTE — PROGRESS NOTE ADULT - REASON FOR ADMISSION
foot ulcer

## 2022-01-25 NOTE — PROGRESS NOTE ADULT - SUBJECTIVE AND OBJECTIVE BOX
Chief complaint  Patient is a 61y old  Male who presents with a chief complaint of foot ulcer (25 Jan 2022 11:49)   Review of systems  Patient in bed, looks comfortable, no hypoglycemic episodes.    Labs and Fingersticks  CAPILLARY BLOOD GLUCOSE      POCT Blood Glucose.: 188 mg/dL (25 Jan 2022 13:52)  POCT Blood Glucose.: 173 mg/dL (25 Jan 2022 12:29)  POCT Blood Glucose.: 104 mg/dL (25 Jan 2022 07:53)  POCT Blood Glucose.: 139 mg/dL (24 Jan 2022 21:32)  POCT Blood Glucose.: 80 mg/dL (24 Jan 2022 17:18)      Anion Gap, Serum: 15 (01-25 @ 07:56)  Anion Gap, Serum: 14 (01-24 @ 07:15)      Calcium, Total Serum: 8.9 (01-25 @ 07:56)  Calcium, Total Serum: 8.9 (01-24 @ 07:15)          01-25    138  |  100  |  33<H>  ----------------------------<  102<H>  3.7   |  23  |  1.43<H>    Ca    8.9      25 Jan 2022 07:56      Medications  MEDICATIONS  (STANDING):  ampicillin/sulbactam  IVPB 3 Gram(s) IV Intermittent every 8 hours  aspirin  chewable 81 milliGRAM(s) Oral daily  atorvastatin 80 milliGRAM(s) Oral at bedtime  clopidogrel Tablet 75 milliGRAM(s) Oral daily  dextrose 40% Gel 15 Gram(s) Oral once  dextrose 5%. 1000 milliLiter(s) (50 mL/Hr) IV Continuous <Continuous>  dextrose 5%. 1000 milliLiter(s) (100 mL/Hr) IV Continuous <Continuous>  dextrose 50% Injectable 25 Gram(s) IV Push once  dextrose 50% Injectable 12.5 Gram(s) IV Push once  dextrose 50% Injectable 25 Gram(s) IV Push once  enoxaparin Injectable 40 milliGRAM(s) SubCutaneous daily  furosemide    Tablet 40 milliGRAM(s) Oral daily  glucagon  Injectable 1 milliGRAM(s) IntraMuscular once  influenza   Vaccine 0.5 milliLiter(s) IntraMuscular once  insulin glargine Injectable (LANTUS) 67 Unit(s) SubCutaneous at bedtime  insulin lispro (ADMELOG) corrective regimen sliding scale   SubCutaneous three times a day before meals  insulin lispro (ADMELOG) corrective regimen sliding scale   SubCutaneous at bedtime  insulin lispro Injectable (ADMELOG) 12 Unit(s) SubCutaneous three times a day before meals  mupirocin 2% Ointment 1 Application(s) Topical two times a day  pantoprazole    Tablet 40 milliGRAM(s) Oral before breakfast  sacubitril 49 mG/valsartan 51 mG 1 Tablet(s) Oral two times a day  tamsulosin 0.4 milliGRAM(s) Oral at bedtime      Physical Exam  General: Patient comfortable in bed  Vital Signs Last 12 Hrs  T(F): 97.9 (01-25-22 @ 13:38), Max: 97.9 (01-25-22 @ 13:38)  HR: 88 (01-25-22 @ 13:38) (88 - 99)  BP: 97/64 (01-25-22 @ 13:38) (97/64 - 117/79)  BP(mean): --  RR: 18 (01-25-22 @ 13:38) (18 - 18)  SpO2: 98% (01-25-22 @ 13:38) (96% - 98%)  Neck: No palpable thyroid nodules.  CVS: S1S2, No murmurs  Respiratory: No wheezing, no crepitations  GI: Abdomen soft, bowel sounds positive  Musculoskeletal:  edema lower extremities.   Skin: No skin rashes, no ecchymosis    Diagnostics

## 2022-01-25 NOTE — PROGRESS NOTE ADULT - PROBLEM SELECTOR PLAN 1
Will continue current insulin regimen for now. Will continue monitoring FS, log, and FU.  Patient counseled for compliance with consistent low carb diet and exercise as tolerated outpatient.
L foot submetatarsal wound not improving no PO abx, now with worsening swelling and erythema tracking proximally, c/w cellulitis.  and   - podiatry following  id consulted  abx as per id - plan dc on oral augmentin x 5 more days   mri noted with osteo   s/p left foot partial 3rd ray resection 1/21  post op care as per pod  f/u OR cult  PT
L foot submetatarsal wound not improving no PO abx, now with worsening swelling and erythema tracking proximally, c/w cellulitis.  and   - podiatry following with wound tracking to bone,  id consulted  abx as per id  f/u cult and sens  mri noted with osteo  pending OR friday  no medical contraindication to proceed with planned surgery
L foot submetatarsal wound not improving no PO abx, now with worsening swelling and erythema tracking proximally, c/w cellulitis.  and   - podiatry following with wound tracking to bone,  possible OM   id consulted  abx as per id  f/u cult and sens  f/u mri  pending OR friday
Will continue current insulin regimen for now. Will continue monitoring FS, log, and FU.  Suggest DC on current basal bolus insulin regimen, endo FU 4 weeks.  Patient counseled for compliance with consistent low carb diet and exercise as tolerated outpatient.
-stable  -from foot infection  -Augmentin 500 mg po bid x 5 days  -bcx negative  -potential side effects of abx explained including GI, Cdiff, allergy issues, development of resistance, etc.
L foot submetatarsal wound not improving no PO abx, now with worsening swelling and erythema tracking proximally, c/w cellulitis.  and   - podiatry following  id consulted  abx as per id  mri noted with osteo   s/p left foot partial 3rd ray resection today  post op care as per pod  f/u OR cult  PT
Will continue Lantus 67u at bedtime.  Will decrease Admelog to 12u before each meal and continue Admelog correction scale coverage. Will continue monitoring FS and FU.  Suggest DC on current basal bolus insulin regimen, endo FU 4 weeks.  Patient counseled for compliance with consistent low carb diet and exercise as tolerated outpatient.
Will continue current insulin regimen for now. Will continue monitoring FS, log, and FU.  Patient counseled for compliance with consistent low carb diet and exercise as tolerated outpatient.
L foot submetatarsal wound not improving no PO abx, now with worsening swelling and erythema tracking proximally, c/w cellulitis.  and   - podiatry following  id consulted  abx as per id - f/u with id regarding dc planning  mri noted with osteo   s/p left foot partial 3rd ray resection 1/21  post op care as per pod  f/u OR cult  PT
Will continue current insulin regimen for now. Will continue monitoring FS, log, and FU.  Suggest DC on current basal bolus insulin regimen, endo FU 4 weeks.  Patient counseled for compliance with consistent low carb diet and exercise as tolerated outpatient.
-stable  -from foot infection  -Augmentin 500 mg po bid x 5 days  -bcx negative  -potential side effects of abx explained including GI, Cdiff, allergy issues, development of resistance, etc.
L foot submetatarsal wound not improving no PO abx, now with worsening swelling and erythema tracking proximally, c/w cellulitis.  and   - podiatry following  id consulted  abx as per id  mri noted with osteo   s/p left foot partial 3rd ray resection 1/21  post op care as per pod  f/u OR cult  PT
-stable  -from foot infection  -increased Unasyn 3 gm iv Q8 based on cx  -bcx negative
L foot submetatarsal wound not improving no PO abx, now with worsening swelling and erythema tracking proximally, c/w cellulitis.  and   - podiatry following with wound tracking to bone,  possible OM   id consulted  vanco/ceftriax as per id.  f/u cult  f/u mri  pending OR friday
-stable  -from foot infection  -cont Unasyn 3 gm iv Q8   -bcx negative
-stable  -from foot infection  -dc cefepime   -Unasyn 3 gm iv Q24 based on cx  -bcx negative

## 2022-01-25 NOTE — PROGRESS NOTE ADULT - PROBLEM SELECTOR PROBLEM 1
Type 2 diabetes mellitus treated with insulin
Type 2 diabetes mellitus treated with insulin
Cellulitis of left foot
Type 2 diabetes mellitus treated with insulin
Sepsis
Cellulitis of left foot
Sepsis
Sepsis
Cellulitis of left foot
Sepsis
Sepsis

## 2022-01-25 NOTE — PROGRESS NOTE ADULT - RS GEN PE MLT RESP DETAILS PC
respirations non-labored/clear to auscultation bilaterally/no wheezes

## 2022-01-25 NOTE — PROGRESS NOTE ADULT - ASSESSMENT
Assessment  DMT2: 61y Male with DM T2 with hyperglycemia admitted with foot wound, was on insulin at home, now on basal bolus insulin, bolus dose held with dinner 2/2 blood sugars trending down, FS in acceptable range, no hypoglycemias, eating meals, well-appearing, planning DC.  Foot wound: on medications, monitored, asymptomatic.  HTN: On antihypertensive medications, monitored, asymptomatic.  Overweight: No strict exercise routines, neither on low calorie diet.       Cortney Flanagan MD  Cell: [phone]  Office: [phone]

## 2022-01-25 NOTE — PROGRESS NOTE ADULT - PROBLEM SELECTOR PROBLEM 3
MANJIT (acute kidney injury)
Diabetic infection of left foot
MANJIT (acute kidney injury)
MANJIT (acute kidney injury)
Chronic systolic heart failure
MANJIT (acute kidney injury)
Chronic systolic heart failure
MANJIT (acute kidney injury)
Diabetic infection of left foot

## 2022-01-25 NOTE — PROGRESS NOTE ADULT - NSPROGADDITIONALINFOA_GEN_ALL_CORE
Advanced care planning was discussed with patient and family.  Advanced care planning forms were reviewed and discussed as appropriate.  Differential diagnosis and plan of care discussed with patient after the evaluation.   Pain assessed and judicious use of narcotics when appropriate was discussed.  Importance of Fall prevention discussed.  Counseling on Smoking and Alcohol cessation was offered when appropriate.  Counseling on Diet, exercise, and medication compliance was done.     approx 65 min spent
dc planning      Advanced care planning was discussed with patient and family.  Advanced care planning forms were reviewed and discussed as appropriate.  Differential diagnosis and plan of care discussed with patient after the evaluation.   Pain assessed and judicious use of narcotics when appropriate was discussed.  Importance of Fall prevention discussed.  Counseling on Smoking and Alcohol cessation was offered when appropriate.  Counseling on Diet, exercise, and medication compliance was done.     approx 65 min spent
Advanced care planning was discussed with patient and family.  Advanced care planning forms were reviewed and discussed as appropriate.  Differential diagnosis and plan of care discussed with patient after the evaluation.   Pain assessed and judicious use of narcotics when appropriate was discussed.  Importance of Fall prevention discussed.  Counseling on Smoking and Alcohol cessation was offered when appropriate.  Counseling on Diet, exercise, and medication compliance was done.     approx 65 min spent
D/w clinical pharmacist
Please call the ID service [phone] with questions or concerns over the weekend.
D/w Dr. Marie  D/w ASHLEY (Tiffany)
Will sign off, recall ID if needed #[phone].

## 2022-01-25 NOTE — PROGRESS NOTE ADULT - NEGATIVE OPHTHALMOLOGIC SYMPTOMS
no diplopia/no pain L/no pain R

## 2022-01-25 NOTE — CHART NOTE - NSCHARTNOTEFT_GEN_A_CORE
Request from Dr. Marie to facilitate patient discharge. Medication reconciliation reviewed, revised, and resolved with Dr. Marie who had medically cleared patient for discharge with follow-up as advised. Please refer to discharge note for detailed hospital course. Patient is currently stable for discharge to home with home care at this time.      Enedina Servin PA-C  Dept of Medicine   Contact #[phone]

## 2022-01-25 NOTE — PROGRESS NOTE ADULT - NEUROLOGICAL DETAILS
alert and oriented x 3/responds to verbal commands
alert and oriented x 3/responds to verbal commands/normal strength

## 2022-01-25 NOTE — PROGRESS NOTE ADULT - PROBLEM SELECTOR PLAN 2
Suggest to continue medications, monitoring, FU primary team/ID/Podiatry recommendations.
septic with tachycardia and leukocytosis with elevated lactate 2.5 in setting of cellulitis/OM of LLE   - s/p 2L IVF in ED with improvement of lactate   - c/w abx as above   - f/u all cx data  - defer further IVF 2/2 HF   - mentating well, extremities warm/perfused
septic with tachycardia and leukocytosis with elevated lactate 2.5 in setting of cellulitis/OM of LLE   - s/p 2L IVF in ED with improvement of lactate   - c/w abx as above   - f/u all cx data  - defer further IVF 2/2 HF   - mentating well, extremities warm/perfused
Suggest to continue medications, monitoring, FU primary team/ID/Podiatry recommendations.
septic with tachycardia and leukocytosis with elevated lactate 2.5 in setting of cellulitis/OM of LLE   - s/p 2L IVF in ED with improvement of lactate   - c/w abx as above   - f/u all cx data  - defer further IVF 2/2 HF   - mentating well, extremities warm/perfused
Suggest to continue medications, monitoring, FU primary team/ID/Podiatry recommendations.
Suggest to continue medications, monitoring, FU primary team/ID/Podiatry recommendations.
septic with tachycardia and leukocytosis with elevated lactate 2.5 in setting of cellulitis/OM of LLE   - s/p 2L IVF in ED with improvement of lactate   - c/w abx as above   - f/u all cx data  - defer further IVF 2/2 HF   - mentating well, extremities warm/perfused
Suggest to continue medications, monitoring, FU primary team/ID/Podiatry recommendations.
septic with tachycardia and leukocytosis with elevated lactate 2.5 in setting of cellulitis/OM of LLE   - s/p 2L IVF in ED with improvement of lactate   - c/w abx as above   - f/u all cx data  - defer further IVF 2/2 HF   - mentating well, extremities warm/perfused
-cont abx  -awaiting MRI foot
-cont abx  -MRI foot noted - s/p toe amputation - low concern for residual infection
-cont abx  -MRI foot noted - for OR tomorrow

## 2022-01-25 NOTE — PROGRESS NOTE ADULT - PROBLEM SELECTOR PLAN 3
On medications,  no chest pain, stable, monitored and followed up by primary team/cardiology team.
On medications,  no chest pain, stable, monitored and followed up by primary team/cardiology team.
MANJIT vs CKD, Cr 2.5 with last baseline ~1.5 (in 2020) - possibly in setting of sepsis   - check urine lytes  - was holding entresto and lasix for now   - s/p 2L in ED, will defer further IVF 2/2 HF with EF 15-20%  - strict I/Os  -  renal f/u  creat improved  resumed lasix and entresto
MANJIT vs CKD, Cr 2.5 with last baseline ~1.5 (in 2020) - possibly in setting of sepsis   - check urine lytes  - holding entresto and lasix for now   - s/p 2L in ED, will defer further IVF 2/2 HF with EF 15-20%  - strict I/Os  -  renal f/u
On medications,  no chest pain, stable, monitored and followed up by primary team/cardiology team.
On medications,  no chest pain, stable, monitored and followed up by primary team/cardiology team.
MANJIT vs CKD, Cr 2.5 with last baseline ~1.5 (in 2020) - possibly in setting of sepsis   - check urine lytes  - holding entresto and lasix for now   - s/p 2L in ED, will defer further IVF 2/2 HF with EF 15-20%  - strict I/Os  -  renal f/u
On medications,  no chest pain, stable, monitored and followed up by primary team/cardiology team.
-abx as above  -DM control per primary team  -local care per podiatry
MANJIT vs CKD, Cr 2.5 with last baseline ~1.5 (in 2020) - possibly in setting of sepsis   - check urine lytes  - holding entresto and lasix for now   - s/p 2L in ED, will defer further IVF 2/2 HF with EF 15-20%  - strict I/Os  -  renal f/u
MANJIT vs CKD, Cr 2.5 with last baseline ~1.5 (in 2020) - possibly in setting of sepsis   - check urine lytes  - holding entresto and lasix for now   - s/p 2L in ED, will defer further IVF 2/2 HF with EF 15-20%  - strict I/Os  -  renal f/u
MANJIT vs CKD, Cr 2.5 with last baseline ~1.5 (in 2020) - possibly in setting of sepsis   - check urine lytes  - holding entresto and lasix for now   - s/p 2L in ED, will defer further IVF 2/2 HF with EF 15-20%  - strict I/Os  -  renal consulted
MANJIT vs CKD, Cr 2.5 with last baseline ~1.5 (in 2020) - possibly in setting of sepsis   - check urine lytes  - holding entresto and lasix for now   - s/p 2L in ED, will defer further IVF 2/2 HF with EF 15-20%  - strict I/Os  -  renal f/u  creat improved
-abx as above  -DM control per primary team  -local care per podiatry
-abx as above  -DM control per primary team
-abx as above  -DM control per primary team  -local care per podiatry
-abx as above  -DM control per primary team

## 2022-01-25 NOTE — PROGRESS NOTE ADULT - ASSESSMENT
61M with PMH of HTN, CAD/MI s/p stents, HFrEF (EF 15-20%) s/p AICD, T2DM on insulin p/w foot ulcer with foot cellulitis, diabetic foot infection, leukocytosis, sepsis     Sam Orozco  Attending Physician   Division of Infectious Disease  Pager #[phone]  Available on [employer] Teams also  After 5pm/weekend or no response, call #[phone]

## 2022-01-25 NOTE — PROGRESS NOTE ADULT - PROBLEM SELECTOR PLAN 4
h/o HFrEF with EF 15-20%, s/p AICD  currently appears euvolemic   holding lasix, Entresto, BB and indapamide for hypotension and MANJIT   monitor volume status, resume as tolerated   cards f/u dr mirza
Suggest to continue medications, monitoring, FU primary team recommendations.
h/o HFrEF with EF 15-20%, s/p AICD  currently appears euvolemic   holding lasix, Entresto, BB and indapamide for hypotension and MANJIT   monitor volume status, resume as tolerated   cards f/u dr mirza
h/o HFrEF with EF 15-20%, s/p AICD  currently appears euvolemic   resume home cardiac meds  monitor volume status, resume as tolerated   cards f/u dr mirza
h/o HFrEF with EF 15-20%, s/p AICD  currently appears euvolemic   holding lasix, Entresto, BB and indapamide for hypotension and MANJIT   monitor volume status, resume as tolerated   cards f/u dr mirza
-better  -cont abx

## 2022-01-25 NOTE — DISCHARGE NOTE NURSING/CASE MANAGEMENT/SOCIAL WORK - NSDCPEFALRISK_GEN_ALL_CORE
For information on Fall & Injury Prevention, visit: [email or web address] OR  [email or web address] OR  https://www.cdc.gov/steadi/patient.html

## 2022-01-25 NOTE — PROGRESS NOTE ADULT - PROVIDER SPECIALTY LIST ADULT
Podiatry
Nephrology
Podiatry
Endocrinology
Infectious Disease
Podiatry
Nephrology
Internal Medicine
Internal Medicine
Endocrinology
Internal Medicine
Infectious Disease
Internal Medicine
Infectious Disease

## 2022-01-25 NOTE — PROGRESS NOTE ADULT - SUBJECTIVE AND OBJECTIVE BOX
Podiatry pager #: [phone] ([place])/ [phone] ([facility])    Patient is a 61y old  Male who presents with a chief complaint of foot ulcer (24 Jan 2022 21:43)       INTERVAL HPI/OVERNIGHT EVENTS:  Patient seen and evaluated at bedside.  Pt is resting comfortable in NAD. Denies N/V/F/C.     Allergies    No Known Allergies    Intolerances        Vital Signs Last 24 Hrs  T(C): 36.5 (25 Jan 2022 04:58), Max: 36.9 (24 Jan 2022 14:03)  T(F): 97.7 (25 Jan 2022 04:58), Max: 98.4 (24 Jan 2022 14:03)  HR: 99 (25 Jan 2022 04:58) (99 - 103)  BP: 117/79 (25 Jan 2022 04:58) (102/66 - 117/79)  BP(mean): --  RR: 18 (25 Jan 2022 04:58) (18 - 18)  SpO2: 96% (25 Jan 2022 04:58) (92% - 97%)    LABS:    01-25    138  |  100  |  33<H>  ----------------------------<  102<H>  3.7   |  23  |  1.43<H>    Ca    8.9      25 Jan 2022 07:56          CAPILLARY BLOOD GLUCOSE      POCT Blood Glucose.: 104 mg/dL (25 Jan 2022 07:53)  POCT Blood Glucose.: 139 mg/dL (24 Jan 2022 21:32)  POCT Blood Glucose.: 80 mg/dL (24 Jan 2022 17:18)  POCT Blood Glucose.: 150 mg/dL (24 Jan 2022 13:10)  POCT Blood Glucose.: 187 mg/dL (24 Jan 2022 12:03)      Lower Extremity Physical Exam:  Vascular: DP/PT 2/4, B/L, CFT <3 seconds B/L, Temperature gradient warm to warm on the L and warm to cool on the R  Neuro: Epicritic sensation diminished to the level of ankle, B/L.  Musculoskeletal/Ortho: unremarkable  Skin: improved edema to L leg   s/p left foot partial 3rd ray resection closed (DOS 1/21): skin well coapted, sutures intact, skin flaps warm to touch, no hematoma, improving erythema present from forefoot to anterior ankle, mild strikethrough on dressing   left foot plantar midfoot wound to bone, fibrogranular, no purulence, no malodor    RADIOLOGY & ADDITIONAL TESTS:

## 2022-01-25 NOTE — PROGRESS NOTE ADULT - GASTROINTESTINAL DETAILS
soft/nontender/no distention/no guarding/no rigidity
soft/nontender/no distention/no rebound tenderness/no guarding
soft/nontender/no distention/no rebound tenderness/no guarding/no rigidity
soft/nontender/no distention/no guarding/no rigidity
soft/nontender/no distention/no rebound tenderness/no guarding/no rigidity

## 2022-01-25 NOTE — PROGRESS NOTE ADULT - PROBLEM SELECTOR PROBLEM 2
Cellulitis of left foot
Sepsis
Sepsis
Cellulitis of left foot
Cellulitis of left foot
Sepsis
Cellulitis of left foot
Sepsis
Cellulitis of left foot

## 2022-01-25 NOTE — PROGRESS NOTE ADULT - ASSESSMENT
60yo M s/p left foot partial 3rd ray resection (DOS 1/21)  - pt seen and evaluated  - afebrile, no leukocytosis  - left foot surgical incision site well coapted w/ sutures intact, no hematoma, skin flaps warm to touch, improving erythema from forefoot to anterior ankle; left foot plantar midfoot wound to bone, fibrogranular wound bed, no purulence, no malodor; improving LLE pitting edema to tibial tuberosity   - low concern for residual bone infection  - low concern for viability  - Clean bone culture preliminary w/ no growth  - instructed patient to remain NWB to left foot as much as possible other than for using the bathroom, pt to elevate the foot as much as possible   - Rx CAM boot for LLE to be delivered by orthotist   - stable for discharge from pod standpoint, ID recs 5 days PO Augmentin upon d/c (appreciated)  - instructions for discharge in provider note under follow up  - seen w/ attending

## 2022-01-25 NOTE — PROGRESS NOTE ADULT - LYMPHATIC SYMPTOMS
swelling of extremity

## 2022-01-25 NOTE — DISCHARGE NOTE NURSING/CASE MANAGEMENT/SOCIAL WORK - NSDCVIVACCINE_GEN_ALL_CORE_FT
influenza, injectable, quadrivalent, preservative free; 08-Feb-2018 11:10; Chely Patterson (RN); [employer]; [id number]; IntraMuscular; Deltoid Left.; 0.5 milliLiter(s); VIS (VIS Published: 07-Aug-2015, VIS Presented: 08-Feb-2018);   influenza, injectable, quadrivalent, preservative free; 21-Sep-2020 18:51; Yan Cazares (RN); [employer]; [id number] (Exp. Date: 30-Jun-2021); IntraMuscular; Deltoid Right.; 0.5 milliLiter(s); VIS (VIS Published: 15-Aug-2019, VIS Presented: 21-Sep-2020);

## 2022-01-26 ENCOUNTER — APPOINTMENT (OUTPATIENT)
Dept: ELECTROPHYSIOLOGY | Facility: CLINIC | Age: 62
End: 2022-01-26
Payer: MEDICARE

## 2022-01-26 ENCOUNTER — NON-APPOINTMENT (OUTPATIENT)
Age: 62
End: 2022-01-26

## 2022-01-26 LAB
CULTURE RESULTS: SIGNIFICANT CHANGE UP
SPECIMEN SOURCE: SIGNIFICANT CHANGE UP

## 2022-01-26 PROCEDURE — 93295 DEV INTERROG REMOTE 1/2/MLT: CPT

## 2022-01-26 PROCEDURE — 93296 REM INTERROG EVL PM/IDS: CPT

## 2022-02-04 ENCOUNTER — APPOINTMENT (OUTPATIENT)
Dept: WOUND CARE | Facility: CLINIC | Age: 62
End: 2022-02-04
Payer: MEDICARE

## 2022-02-04 VITALS — HEIGHT: 77 IN | BODY MASS INDEX: 31.17 KG/M2 | WEIGHT: 264 LBS | TEMPERATURE: 96.8 F

## 2022-02-04 DIAGNOSIS — L97.522 NON-PRESSURE CHRONIC ULCER OF OTHER PART OF LEFT FOOT WITH FAT LAYER EXPOSED: ICD-10-CM

## 2022-02-04 DIAGNOSIS — M86.172 OTHER ACUTE OSTEOMYELITIS, LEFT ANKLE AND FOOT: ICD-10-CM

## 2022-02-04 PROCEDURE — 11042 DBRDMT SUBQ TIS 1ST 20SQCM/<: CPT

## 2022-02-04 PROCEDURE — 99203 OFFICE O/P NEW LOW 30 MIN: CPT | Mod: 25

## 2022-02-04 NOTE — HISTORY OF PRESENT ILLNESS
[FreeTextEntry1] : 61 YEAR OLD DIABETIC PATIENT presents to wound center for evaluation of infected left 3rd toe\par patient was seen by Dr. Foster for an infected corn\par patient went away on vacation and bone became infected\par patient went to [facility] and was seen by Dr. Sigala for osteomyelitis\par left 3rd ray resection s/p 2 weeks\par sutures intact with plantar ulcer left foot with no signs of cellulitis and no signs of acute infection\par palpable pedal pulses bith feet\par absent protective threshold with patient unable to sense 5.07 semmes sondra monofilament wire left foot\par patient wearing cam walker left foot\par xerotic skin both feet\par onychomycotic dystrophic discolored nails both feet

## 2022-02-04 NOTE — PLAN
[FreeTextEntry1] : full thickness debridement of ulceration left foot with sterile #10 blade down to the level of the subcutaneous tissue left foot \par Patient is no longer taking antibiotics\par recommend patient follow up with infectious disease\par patient will see Dr. Sigala next week for evaluation and possible suture removal\par Patient may benefit from HBO but patient is claustrophobic\par patient will discuss possible HBO therapy with Dr. Sigala

## 2022-02-07 ENCOUNTER — APPOINTMENT (OUTPATIENT)
Dept: WOUND CARE | Facility: CLINIC | Age: 62
End: 2022-02-07
Payer: MEDICARE

## 2022-02-07 VITALS — TEMPERATURE: 96.4 F

## 2022-02-07 DIAGNOSIS — Z89.422 ACQUIRED ABSENCE OF OTHER LEFT TOE(S): ICD-10-CM

## 2022-02-07 PROCEDURE — 99213 OFFICE O/P EST LOW 20 MIN: CPT | Mod: 24

## 2022-02-14 ENCOUNTER — APPOINTMENT (OUTPATIENT)
Dept: WOUND CARE | Facility: CLINIC | Age: 62
End: 2022-02-14
Payer: MEDICARE

## 2022-02-14 PROCEDURE — 11042 DBRDMT SUBQ TIS 1ST 20SQCM/<: CPT | Mod: 79

## 2022-02-28 ENCOUNTER — APPOINTMENT (OUTPATIENT)
Dept: WOUND CARE | Facility: CLINIC | Age: 62
End: 2022-02-28
Payer: MEDICARE

## 2022-02-28 VITALS — HEIGHT: 77 IN | BODY MASS INDEX: 31.17 KG/M2 | WEIGHT: 264 LBS | TEMPERATURE: 97.9 F

## 2022-02-28 PROCEDURE — 99213 OFFICE O/P EST LOW 20 MIN: CPT

## 2022-02-28 PROCEDURE — 99204 OFFICE O/P NEW MOD 45 MIN: CPT

## 2022-02-28 PROCEDURE — 99024 POSTOP FOLLOW-UP VISIT: CPT

## 2022-02-28 NOTE — HISTORY OF PRESENT ILLNESS
[FreeTextEntry1] : 61 YEAR OLD DIABETIC PATIENT presents to wound center for evaluation of infected left 3rd toe\par - patient was seen by Dr. Foster for an infected corn\par - patient went away on vacation and bone became infected\par - patient went to [facility] and was seen by Dr. Sigala for osteomyelitis\par - left 3rd ray resection s/p 3 weeks (1/21/22)\par - sutures intact with plantar ulcer left foot with no signs of cellulitis and no signs of acute infection\par - Surgical pathology: no OM\par - pt states that he has been compliant with weight bearing only using the CAM boot\par - Pt denies N/V/C/F/SOB

## 2022-02-28 NOTE — PLAN
[FreeTextEntry1] : 61M with L foot wound \par - Patient is no longer taking antibiotics\par - L foot sutures are well coapted with no signs of dehiscence \par - Sutures removed using sterile suture removal kit and #15 blade, pt tolerated procedure well \par - L foot plantar wound debrided using sterile #10 blade to the level of subQ and not beyond, pt tolerated procedure well\par - L foot plantar wound packed with 1/4 inch iodoform packing and dressed with DSD \par - Plantar wound is 50% fibrotic and 50% granular\par - Continue changing dressings every other day with home nursing \par - patient to continue to wear a post op shoe left foot\par - RTC in 2 weeks

## 2022-02-28 NOTE — PLAN
[FreeTextEntry1] : 61M with L foot wound \par - L foot incision line healed no signs of dehiscence \par - L foot plantar wound healed well\par - patient cleared to transition into diabetic shoes with multidensity insoles (Rx given)\par - RTC in 1 month for diabetic foot care in [facility] or [facility] office.

## 2022-02-28 NOTE — HISTORY OF PRESENT ILLNESS
[FreeTextEntry1] : 61 YEAR OLD DIABETIC PATIENT presents to wound center for evaluation of infected left 3rd toe\par - patient was seen by Dr. Foster for an infected corn\par - patient went away on vacation and bone became infected\par - patient went to [facility] and was seen by Dr. Sigala for osteomyelitis\par - left 3rd ray resection s/p 3 weeks (1/21/22)\par - sutures intact with plantar ulcer left foot with no signs of cellulitis and no signs of acute infection\par - Surgical pathology: no OM\par - pt states that he has been compliant with weight bearing only using the CAM boot\par - Pt denies N/V/C/F/SOB

## 2022-03-17 NOTE — HISTORY OF PRESENT ILLNESS
[FreeTextEntry1] : 61 YEAR OLD DIABETIC PATIENT presents to wound center for evaluation of infected left 3rd toe\par - patient was seen by Dr. Foster for an infected corn\par - patient went away on vacation and bone became infected\par - patient went to [facility] and was seen by Dr. Sigala for osteomyelitis\par - left 3rd ray resection s/p 3 weeks (1/21/22)\par - sutures intact with plantar ulcer left foot with no signs of cellulitis and no signs of acute infection\par - Surgical pathology: no OM\par - pt states that he has been compliant with weight bearing only using the CAM boot\par - Pt denies N/V/C/F/SOB

## 2022-03-17 NOTE — PLAN
[FreeTextEntry1] : 61M with L foot wound \par - Patient is no longer taking antibiotics\par - L foot sutures are well copated with no signs of dehiscence \par - Sutures removed using sterile suture removal kit and #15 blade, pt tolerated procedure well \par - L foot plantar wound debrided using sterile #10 blade to the level of subQ and not beyond, pt tolerated procedure well\par - L foot plantar wound packed with 1inch iodoform packing and dressed with DSD \par - Plantar wound is 50% fibrotic and 50% granular\par - palpable pedal pulses both feet\par - absent protective threshold with patient unable to sense 5.07 semmes sondra monofilament wire left foot\par - Continue changing dressings every other day with home nursing \par - patient to continue to wear cam walker left foot\par - onychomycotic dystrophic discolored nails both feet\par - Asceptic debridement of toe nails x10, pt tolerated procedure well \par - RTC in 1 week to see Dr. Sigala

## 2022-04-27 ENCOUNTER — APPOINTMENT (OUTPATIENT)
Dept: ELECTROPHYSIOLOGY | Facility: CLINIC | Age: 62
End: 2022-04-27
Payer: MEDICARE

## 2022-04-27 ENCOUNTER — NON-APPOINTMENT (OUTPATIENT)
Age: 62
End: 2022-04-27

## 2022-04-27 PROCEDURE — 93296 REM INTERROG EVL PM/IDS: CPT

## 2022-04-27 PROCEDURE — 93295 DEV INTERROG REMOTE 1/2/MLT: CPT

## 2022-06-06 ENCOUNTER — APPOINTMENT (OUTPATIENT)
Dept: WOUND CARE | Facility: CLINIC | Age: 62
End: 2022-06-06
Payer: MEDICARE

## 2022-06-06 VITALS — TEMPERATURE: 97.8 F

## 2022-06-06 PROCEDURE — 99213 OFFICE O/P EST LOW 20 MIN: CPT

## 2022-06-13 ENCOUNTER — APPOINTMENT (OUTPATIENT)
Dept: WOUND CARE | Facility: CLINIC | Age: 62
End: 2022-06-13

## 2022-06-13 NOTE — PLAN
[FreeTextEntry1] : 61M with L foot wound\par \par - pt seen and evaluated\par - L plantar forefoot wound down to dermis w/ hyperkeratotic periwound, stable w/ no acute signs of infection \par - patient cleared to transition into diabetic shoes with multidensity insoles (Rx given)\par - Explained to pt that in [place] he must keep his feet protected at all times, to continue to check his feet 2x daily and to avoid being barefoot \par - Cut out made into orthotic sub mt 4 into the R orthotic \par - upon return pt is to have orthotics modified by [facility] \par - RTC in 2 weeks

## 2022-06-30 NOTE — DISCHARGE NOTE PROVIDER - NSDCCPGOAL_GEN_ALL_CORE_FT
[facility]  [facility]  Phone: [phone]        Pt Name: Nasrin Yuen  MRN: [id number]  [place] 1975  Date of evaluation: 22    [address] [place] [address]       Chief Complaint   Patient presents with    Laceration     Left BiCept    Knee Pain     Right       HISTORY OF PRESENT ILLNESS (Location/Symptom, Timing/Onset, Context/Setting, Quality, Duration, Modifying Factors, Severity)      Nasrin Yuen is a 42 y.o. female with no pertinent PMH who presents to the ED via private auto with complaints of a fall with a laceration to the left bicep as well as left shin pain and right knee pain. Patient states that about 20 minutes ago she was walking in the garage when she tripped over the lawnmower and fell landing onto the ground. She states that the laceration is from a gas can funnel that did penetrate her arm which she pulled out. She states she did not hit her head or lose consciousness. She denies any neck or back pain. Denies any abdominal pain. Denies any headache dizziness or lightheadedness. Denies any nausea or vomiting. 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 Surgical History:  has a past surgical history that includes Brain Biopsy; Tonsillectomy; knee surgery; Cochlear implant (Left); Dental surgery; and Cochlear implant (Right, 05/10/2022). Social History:  reports that she has never smoked. She has never used smokeless tobacco. She reports current alcohol use. She reports that she does not use drugs. Family History: She indicated that her mother is .  She indicated that her father is alive. She indicated that her sister is alive. She indicated that the status of her maternal grandmother is unknown. She indicated that the status of her paternal grandmother is unknown. She indicated that her paternal uncle is . family history includes Cancer in her paternal uncle; Cancer (age of onset: 50) in her mother; Cancer (age of onset: 70) in her paternal grandmother; Diabetes in her father; High Blood Pressure in her father and maternal grandmother; High Cholesterol in her father; Migraines in her mother and sister. Psychiatric History: None    Allergies: Bactrim [sulfamethoxazole-trimethoprim], Bee venom, Ciprofloxacin, and Milk-related compounds    Home Medications:   Prior to Admission medications    Medication Sig Start Date End Date Taking? Authorizing Provider   amoxicillin-clavulanate (AUGMENTIN) 875-125 MG per tablet Take 1 tablet by mouth 2 times daily for 10 days 6/30/22 7/10/22 Yes ADELA Carbajal NP   polyethylene glycol (GOLYTELY) 236 g solution Please follow instructions given to you by your provider 22   Arnaldo Martini MD   bisacodyl (BISACODYL) 5 MG EC tablet Please follow instructions given to you by your provider 22   Arnaldo Martini MD   [facility] SENNA 8.6 MG tablet TAKE 1 TABLET BY MOUTH EVERY DAY 3/21/22   Sue Choi DO   KLOR-CON M10 10 MEQ extended release tablet TAKE 1 TABLET BY MOUTH TWICE A DAY 22   Chris Rao DO   meloxicam (MOBIC) 15 MG tablet  21   Historical Provider, MD   topiramate (TOPAMAX) 50 MG tablet TAKE 1 AND 1/2 TABLETS IN THE MORNING AND 2 TABLETS AT NIGHT.  21   Eliane Dawson MD   gabapentin (NEURONTIN) 100 MG capsule Start with one capsule daily may increase by one tablet up to at total of 3 capsules TID 21   Historical Provider, MD   nystatin (MYCOSTATIN) 110483 UNIT/GM cream APPLY TO AFFECTED AREA TWICE A DAY 10/25/21   Sharmaine Choi DO   metFORMIN (GLUCOPHAGE) 500 MG tablet TAKE 1 TABLET BY MOUTH TWICE A DAY WITH MEALS 6/10/21   Susana Delacruz DO   Handicap Placard MISC by Does not apply route EXPIRES IN 5 YEARS FROM ORIGINAL SCRIPT DATE 4/28/21   Susana Delacruz DO   indomethacin (INDOCIN) 25 MG capsule Take 1 capsule by mouth 2 times daily (with meals) for 5 days 1/27/21 1/20/22  Antonio Aguilar MD   buPROPion (WELLBUTRIN XL) 150 MG extended release tablet Take 300 mg by mouth daily  12/16/20   Historical Provider, MD   ARIPiprazole (ABILIFY) 20 MG tablet Take 1 tablet by mouth daily 11/23/20   Historical Provider, MD   levothyroxine (SYNTHROID) 150 MCG tablet Take 1 tablet by mouth every morning (before breakfast) 11/20/20   Historical Provider, MD   albuterol sulfate  (90 Base) MCG/ACT inhaler INHALE 2 PUFFS BY MOUTH EVERY 6 HOURS AS NEEDED FOR WHEEZING 7/13/20   Tena Choi DO   EPINEPHrine (EPIPEN 2-MINNIE) 0.3 MG/0.3ML SOAJ injection Inject 0.3 mLs into the muscle once for 1 dose Use as directed for allergic reaction 7/8/20 1/20/22  Susana Delacruz DO   lamoTRIgine (LAMICTAL) 150 MG tablet Take 200 mg by mouth daily Pt Is Now Taking 200 MG    Historical Provider, MD   Calcium-Magnesium-Vitamin D (CALCIUM 1200+D3 PO) Take 2 tablets by mouth every morning    Historical Provider, MD   LORazepam (ATIVAN) 1 MG tablet 1 mg daily as needed. 1/29/16   Historical Provider, MD   sertraline (ZOLOFT) 100 MG tablet Take 150 mg by mouth daily     Historical Provider, MD   SUMAtriptan (IMITREX) 100 MG tablet TAKE 1 TABLET AT ONSET OFHEADACHE,MAY REPEAT 2 HOURS LATER. MAX 2/DAY 1/22/15   Earl Murray MD   Ergocalciferol (VITAMIN D2 PO)   Take 1.25 mg by mouth daily     Historical Provider, MD   hydrocortisone (CORTEF) 10 MG tablet Take 10 mg by mouth 2 times daily. Historical Provider, MD       REVIEW OF SYSTEMS  (2-9 systems for level 4, 10 ormore for level 5)      Review of Systems   Constitutional: Negative. HENT: Negative. Eyes: Negative. Respiratory: Negative. Cardiovascular: Negative. Gastrointestinal: Negative. Endocrine: Negative. Genitourinary: Negative. Musculoskeletal:        Right knee pain and left shin pain following fall   Skin:        Laceration to the left upper extremity; bruising of the left shin and right knee   Neurological: Negative. Hematological: Negative. Psychiatric/Behavioral: Negative. All other systems negative except as marked. PHYSICAL EXAM  (up to 7 for level 4, 8 or more for level 5)      INITIAL VITALS:  height is 5' 1\" (1.549 m) and weight is 124.7 kg (275 lb). Her oral temperature is 98.6 °F (37 °C). Her blood pressure is 109/70 and her pulse is 64. Her respiration is 16 and oxygen saturation is 96%. Vital signs reviewed. Physical Exam  Constitutional:       Appearance: Normal appearance. HENT:      Head: Normocephalic. Right Ear: External ear normal.      Left Ear: External ear normal.      Nose: Nose normal.      Mouth/Throat:      Mouth: Mucous membranes are moist.      Pharynx: Oropharynx is clear. Eyes:      Extraocular Movements: Extraocular movements intact. Conjunctiva/sclera: Conjunctivae normal.      Pupils: Pupils are equal, round, and reactive to light. Cardiovascular:      Rate and Rhythm: Normal rate and regular rhythm. Pulses: Normal pulses. Heart sounds: Normal heart sounds. Pulmonary:      Effort: Pulmonary effort is normal.      Breath sounds: Normal breath sounds. Abdominal:      General: Bowel sounds are normal. There is no distension. Palpations: Abdomen is soft. Tenderness: There is no abdominal tenderness. There is no guarding. Musculoskeletal:         General: Swelling and tenderness present. Cervical back: Normal range of motion. No rigidity or tenderness. Comments: Swelling tenderness and bruising noted of the left shin as well as the right knee   Skin:     General: Skin is warm and dry.       Capillary Refill: Capillary refill takes less than 2 seconds. Findings: Bruising present. Comments: Laceration/puncture site to left upper extremity, medial aspect, roughly 2.5 cm in length; surrounding bruising noted   Neurological:      Mental Status: She is alert and oriented to person, place, and time. Psychiatric:         Mood and Affect: Mood normal.         Behavior: Behavior normal.         Thought Content: Thought content normal.         Judgment: Judgment normal.           DIFFERENTIAL DIAGNOSIS / MDM     Upon examination, patient resting comfortably in her room. She appears nontoxic no distress is noted. Her father is at bedside. Heart sounds the normal limits upon auscultation. Lung sounds are clear and equal bilaterally. Bowel sounds are present in all 4 quadrants auscultation. No abdominal distention tenderness or guarding noted. No focal spinal tenderness along the entire spine. No neck pain with range of motion. Patient has full range of motion of her upper and lower extremities. There is a roughly 2.5 cm laceration noted to the left upper extremity at the medial aspect with adipose tissue exposed. Surrounding bruising noted. There is contusion noted of the right knee as well as the left shin. Noted to the posterior aspect of patient's right upper arm not requiring any repair. I will order an x-ray of the right knee as well as the left tibia-fibula to rule out fracture. X-ray of the right knee showing no acute bony or joint abnormality. X-ray of the left tibia-fibula showing no acute osseous abnormality. Per radiologist read. The laceration/puncture site was irrigated with sterile saline and the skin was prepped with chlorhexidine. 5 mL of lidocaine 1% was used as a local anesthetic. Six 4-0 Ethilon sutures were placed to the laceration/puncture site. Patient tolerated the procedure well. Minimal blood loss was noted.   Topical bacitracin was applied and a nonadherent dressing was applied. Educated for patient to follow-up with her primary care physician within the next 7 days for suture removal.  Continue to apply bacitracin until scab has formed. I will send her home with a prescription for antibiotic due to this being a puncture site. Educated patient to monitor for signs of infection including increasing swelling, increasing erythema, increase in skin temp, purulent discharge, red streaking, fever, chills, body aches and return the emergency department if any of these present. Please return to the Emergency Department with any further injury. Continue ibuprofen and Tylenol for pain control as needed. Please continue to rest and ice your lower extremities. Okay to apply topical antibiotic to the abrasion site as well. Patient and her father state understand education patient stable for outpatient follow-up. PLAN (LABS / IMAGING / EKG):  Orders Placed This Encounter   Procedures    XR KNEE RIGHT (3 VIEWS)    XR TIBIA FIBULA LEFT (2 VIEWS)       MEDICATIONS ORDERED:  Orders Placed This Encounter   Medications    ibuprofen (ADVIL;MOTRIN) tablet 600 mg    amoxicillin-clavulanate (AUGMENTIN) 875-125 MG per tablet     Sig: Take 1 tablet by mouth 2 times daily for 10 days     Dispense:  20 tablet     Refill:  0    lidocaine 1 % injection 5 mL       Controlled Substances Monitoring:     DIAGNOSTIC RESULTS     EKG: All EKG's are interpreted by the Emergency Department Physician who either signs or Co-signs this chart in the absenceof a cardiologist.      RADIOLOGY: All images are read by the radiologist and their interpretations are reviewed. XR KNEE RIGHT (3 VIEWS)   Final Result   1. No acute bony or joint abnormality   2. Tricompartmental osteoarthritic changes similar to 2014         XR TIBIA FIBULA LEFT (2 VIEWS)   Final Result   No acute osseous abnormality.              XR KNEE RIGHT (3 VIEWS)    Result Date: 6/30/2022  EXAMINATION: 4 XRAY VIEWS OF THE RIGHT KNEE 6/30/2022 12:26 pm COMPARISON: 11/11/2014 HISTORY: ORDERING SYSTEM PROVIDED HISTORY: fall; pain TECHNOLOGIST PROVIDED HISTORY: fall; pain Reason for Exam: Right knee pain s/p fall FINDINGS: Alignment is anatomic. No fractures or destructive bony abnormalities are seen. Tricompartmental osteoarthritic changes are noted. 1. No acute bony or joint abnormality 2. Tricompartmental osteoarthritic changes similar to 2014     XR TIBIA FIBULA LEFT (2 VIEWS)    Result Date: 6/30/2022  EXAMINATION: 3 XRAY VIEWS OF THE LEFT TIBIA AND FIBULA 6/30/2022 12:26 pm COMPARISON: None. HISTORY: ORDERING SYSTEM PROVIDED HISTORY: fall; pain TECHNOLOGIST PROVIDED HISTORY: fall; pain Reason for Exam: Distal Left lower leg pain with anterior bruising s/p fall FINDINGS: There is no evidence of acute fracture. There is normal alignment. No acute joint abnormality. No focal osseous lesion. No focal soft tissue abnormality. No acute osseous abnormality. LABS:  No results found for this visit on 06/30/22. EMERGENCY DEPARTMENT COURSE           Vitals:    Vitals:    06/30/22 1214   BP: 109/70   Pulse: 64   Resp: 16   Temp: 98.6 °F (37 °C)   TempSrc: Oral   SpO2: 96%   Weight: 124.7 kg (275 lb)   Height: 5' 1\" (1.549 m)     -------------------------  BP: 109/70, Temp: 98.6 °F (37 °C), Heart Rate: 64, Resp: 16      RE-EVALUATION:  See ED Course notes above. CONSULTS:  None    PROCEDURES:  The laceration/puncture site was irrigated with sterile saline and the skin was prepped with chlorhexidine. 5 mL of lidocaine 1% was used as a local anesthetic. Six 4-0 Ethilon sutures were placed to the laceration/puncture site. Patient tolerated the procedure well. Minimal blood loss was noted. Topical bacitracin was applied and a nonadherent dressing was applied. FINAL IMPRESSION      1. Laceration of left upper extremity, initial encounter    2. Contusion of right knee, initial encounter    3.  Contusion of left lower leg, To get better and follow your care plan as instructed. initial encounter          DISPOSITION / PLAN     CONDITION ON DISPOSITION:   Good / Stable for discharge.      PATIENT REFERRED TO:  Paula Renetta, DO  [address] [place] [address]  [phone]    Call in 7 days  For suture removal    [facility]  [address]  [phone]  Go to   If symptoms worsen      DISCHARGE MEDICATIONS:  Discharge Medication List as of 6/30/2022  1:20 PM      START taking these medications    Details   amoxicillin-clavulanate (AUGMENTIN) 875-125 MG per tablet Take 1 tablet by mouth 2 times daily for 10 days, Disp-20 tablet, R-0Normal             ADELA Harris - NP   Emergency Medicine Nurse Practitioner    (Please note that portions of this note were completed with a voice recognition program.  Efforts were made to edit the dictations but occasionally words aremis-transcribed.)     ADELA Harris NP  06/30/22 0461       ADELA Harris NP  06/30/22 2210

## 2022-07-01 ENCOUNTER — INPATIENT (INPATIENT)
Facility: HOSPITAL | Age: 62
LOS: 3 days | Discharge: HOME | DRG: 872 | End: 2022-07-05
Attending: INTERNAL MEDICINE | Admitting: STUDENT IN AN ORGANIZED HEALTH CARE EDUCATION/TRAINING PROGRAM
Payer: MEDICARE

## 2022-07-01 VITALS
RESPIRATION RATE: 20 BRPM | OXYGEN SATURATION: 96 % | WEIGHT: 263.89 LBS | SYSTOLIC BLOOD PRESSURE: 77 MMHG | TEMPERATURE: 99 F | DIASTOLIC BLOOD PRESSURE: 53 MMHG | HEIGHT: 77 IN | HEART RATE: 101 BPM

## 2022-07-01 DIAGNOSIS — Z98.52 VASECTOMY STATUS: Chronic | ICD-10-CM

## 2022-07-01 LAB
ALBUMIN SERPL ELPH-MCNC: 4.1 G/DL — SIGNIFICANT CHANGE UP (ref 3.3–5)
ALP SERPL-CCNC: 99 U/L — SIGNIFICANT CHANGE UP (ref 40–120)
ALT FLD-CCNC: 31 U/L — SIGNIFICANT CHANGE UP (ref 10–45)
ANION GAP SERPL CALC-SCNC: 17 MMOL/L — SIGNIFICANT CHANGE UP (ref 5–17)
ANISOCYTOSIS BLD QL: SIGNIFICANT CHANGE UP
APPEARANCE UR: CLEAR — SIGNIFICANT CHANGE UP
APTT BLD: 31.4 SEC — SIGNIFICANT CHANGE UP (ref 27.5–35.5)
AST SERPL-CCNC: 29 U/L — SIGNIFICANT CHANGE UP (ref 10–40)
BACTERIA # UR AUTO: NEGATIVE — SIGNIFICANT CHANGE UP
BASE EXCESS BLDV CALC-SCNC: 1.5 MMOL/L — SIGNIFICANT CHANGE UP (ref -2–2)
BASOPHILS # BLD AUTO: 0 K/UL — SIGNIFICANT CHANGE UP (ref 0–0.2)
BASOPHILS NFR BLD AUTO: 0 % — SIGNIFICANT CHANGE UP (ref 0–2)
BILIRUB SERPL-MCNC: 0.7 MG/DL — SIGNIFICANT CHANGE UP (ref 0.2–1.2)
BILIRUB UR-MCNC: NEGATIVE — SIGNIFICANT CHANGE UP
BUN SERPL-MCNC: 65 MG/DL — HIGH (ref 7–23)
CA-I SERPL-SCNC: 1.16 MMOL/L — SIGNIFICANT CHANGE UP (ref 1.15–1.33)
CALCIUM SERPL-MCNC: 9.2 MG/DL — SIGNIFICANT CHANGE UP (ref 8.4–10.5)
CHLORIDE BLDV-SCNC: 99 MMOL/L — SIGNIFICANT CHANGE UP (ref 96–108)
CHLORIDE SERPL-SCNC: 96 MMOL/L — SIGNIFICANT CHANGE UP (ref 96–108)
CO2 BLDV-SCNC: 27 MMOL/L — HIGH (ref 22–26)
CO2 SERPL-SCNC: 22 MMOL/L — SIGNIFICANT CHANGE UP (ref 22–31)
COLOR SPEC: YELLOW — SIGNIFICANT CHANGE UP
CREAT SERPL-MCNC: 2.51 MG/DL — HIGH (ref 0.5–1.3)
DIFF PNL FLD: ABNORMAL
EGFR: 29 ML/MIN/1.73M2 — LOW
ELLIPTOCYTES BLD QL SMEAR: SLIGHT — SIGNIFICANT CHANGE UP
EOSINOPHIL # BLD AUTO: 0 K/UL — SIGNIFICANT CHANGE UP (ref 0–0.5)
EOSINOPHIL NFR BLD AUTO: 0 % — SIGNIFICANT CHANGE UP (ref 0–6)
EPI CELLS # UR: 1 /HPF — SIGNIFICANT CHANGE UP
GAS PNL BLDV: 132 MMOL/L — LOW (ref 136–145)
GAS PNL BLDV: SIGNIFICANT CHANGE UP
GAS PNL BLDV: SIGNIFICANT CHANGE UP
GLUCOSE BLDV-MCNC: 131 MG/DL — HIGH (ref 70–99)
GLUCOSE SERPL-MCNC: 123 MG/DL — HIGH (ref 70–99)
GLUCOSE UR QL: NEGATIVE — SIGNIFICANT CHANGE UP
HCO3 BLDV-SCNC: 26 MMOL/L — SIGNIFICANT CHANGE UP (ref 22–29)
HCT VFR BLD CALC: 41.8 % — SIGNIFICANT CHANGE UP (ref 39–50)
HCT VFR BLDA CALC: 40 % — SIGNIFICANT CHANGE UP (ref 39–51)
HGB BLD CALC-MCNC: 13.3 G/DL — SIGNIFICANT CHANGE UP (ref 12.6–17.4)
HGB BLD-MCNC: 12.9 G/DL — LOW (ref 13–17)
HYALINE CASTS # UR AUTO: 14 /LPF — HIGH (ref 0–7)
INR BLD: 1.2 RATIO — HIGH (ref 0.88–1.16)
KETONES UR-MCNC: NEGATIVE — SIGNIFICANT CHANGE UP
LACTATE BLDV-MCNC: 1.4 MMOL/L — SIGNIFICANT CHANGE UP (ref 0.7–2)
LEUKOCYTE ESTERASE UR-ACNC: NEGATIVE — SIGNIFICANT CHANGE UP
LYMPHOCYTES # BLD AUTO: 0.72 K/UL — LOW (ref 1–3.3)
LYMPHOCYTES # BLD AUTO: 10.5 % — LOW (ref 13–44)
MANUAL SMEAR VERIFICATION: SIGNIFICANT CHANGE UP
MCHC RBC-ENTMCNC: 21.6 PG — LOW (ref 27–34)
MCHC RBC-ENTMCNC: 30.9 GM/DL — LOW (ref 32–36)
MCV RBC AUTO: 70.1 FL — LOW (ref 80–100)
MICROCYTES BLD QL: SIGNIFICANT CHANGE UP
MONOCYTES # BLD AUTO: 0.48 K/UL — SIGNIFICANT CHANGE UP (ref 0–0.9)
MONOCYTES NFR BLD AUTO: 7 % — SIGNIFICANT CHANGE UP (ref 2–14)
NEUTROPHILS # BLD AUTO: 5.63 K/UL — SIGNIFICANT CHANGE UP (ref 1.8–7.4)
NEUTROPHILS NFR BLD AUTO: 82.5 % — HIGH (ref 43–77)
NITRITE UR-MCNC: NEGATIVE — SIGNIFICANT CHANGE UP
PCO2 BLDV: 39 MMHG — LOW (ref 42–55)
PH BLDV: 7.43 — SIGNIFICANT CHANGE UP (ref 7.32–7.43)
PH UR: 6 — SIGNIFICANT CHANGE UP (ref 5–8)
PLAT MORPH BLD: NORMAL — SIGNIFICANT CHANGE UP
PLATELET # BLD AUTO: 156 K/UL — SIGNIFICANT CHANGE UP (ref 150–400)
PO2 BLDV: 46 MMHG — HIGH (ref 25–45)
POIKILOCYTOSIS BLD QL AUTO: SIGNIFICANT CHANGE UP
POLYCHROMASIA BLD QL SMEAR: SLIGHT — SIGNIFICANT CHANGE UP
POTASSIUM BLDV-SCNC: 3.2 MMOL/L — LOW (ref 3.5–5.1)
POTASSIUM SERPL-MCNC: 3.3 MMOL/L — LOW (ref 3.5–5.3)
POTASSIUM SERPL-SCNC: 3.3 MMOL/L — LOW (ref 3.5–5.3)
PROT SERPL-MCNC: 7.8 G/DL — SIGNIFICANT CHANGE UP (ref 6–8.3)
PROT UR-MCNC: ABNORMAL
PROTHROM AB SERPL-ACNC: 13.9 SEC — HIGH (ref 10.5–13.4)
RBC # BLD: 5.96 M/UL — HIGH (ref 4.2–5.8)
RBC # FLD: 20.4 % — HIGH (ref 10.3–14.5)
RBC BLD AUTO: ABNORMAL
RBC CASTS # UR COMP ASSIST: 3 /HPF — SIGNIFICANT CHANGE UP (ref 0–4)
SAO2 % BLDV: 77.9 % — SIGNIFICANT CHANGE UP (ref 67–88)
SODIUM SERPL-SCNC: 135 MMOL/L — SIGNIFICANT CHANGE UP (ref 135–145)
SP GR SPEC: 1.02 — SIGNIFICANT CHANGE UP (ref 1.01–1.02)
UROBILINOGEN FLD QL: NEGATIVE — SIGNIFICANT CHANGE UP
WBC # BLD: 6.83 K/UL — SIGNIFICANT CHANGE UP (ref 3.8–10.5)
WBC # FLD AUTO: 6.83 K/UL — SIGNIFICANT CHANGE UP (ref 3.8–10.5)
WBC UR QL: 2 /HPF — SIGNIFICANT CHANGE UP (ref 0–5)

## 2022-07-01 PROCEDURE — 99285 EMERGENCY DEPT VISIT HI MDM: CPT | Mod: GC

## 2022-07-01 PROCEDURE — 71045 X-RAY EXAM CHEST 1 VIEW: CPT | Mod: 26

## 2022-07-01 RX ORDER — POTASSIUM CHLORIDE 20 MEQ
40 PACKET (EA) ORAL ONCE
Refills: 0 | Status: COMPLETED | OUTPATIENT
Start: 2022-07-01 | End: 2022-07-01

## 2022-07-01 RX ORDER — SODIUM CHLORIDE 9 MG/ML
500 INJECTION INTRAMUSCULAR; INTRAVENOUS; SUBCUTANEOUS ONCE
Refills: 0 | Status: COMPLETED | OUTPATIENT
Start: 2022-07-01 | End: 2022-07-01

## 2022-07-01 RX ORDER — PIPERACILLIN AND TAZOBACTAM 4; .5 G/20ML; G/20ML
3.38 INJECTION, POWDER, LYOPHILIZED, FOR SOLUTION INTRAVENOUS ONCE
Refills: 0 | Status: COMPLETED | OUTPATIENT
Start: 2022-07-01 | End: 2022-07-01

## 2022-07-01 RX ORDER — VANCOMYCIN HCL 1 G
1000 VIAL (EA) INTRAVENOUS ONCE
Refills: 0 | Status: COMPLETED | OUTPATIENT
Start: 2022-07-01 | End: 2022-07-01

## 2022-07-01 RX ORDER — ACETAMINOPHEN 500 MG
975 TABLET ORAL ONCE
Refills: 0 | Status: COMPLETED | OUTPATIENT
Start: 2022-07-01 | End: 2022-07-01

## 2022-07-01 RX ADMIN — Medication 975 MILLIGRAM(S): at 20:17

## 2022-07-01 RX ADMIN — PIPERACILLIN AND TAZOBACTAM 200 GRAM(S): 4; .5 INJECTION, POWDER, LYOPHILIZED, FOR SOLUTION INTRAVENOUS at 20:19

## 2022-07-01 RX ADMIN — SODIUM CHLORIDE 500 MILLILITER(S): 9 INJECTION INTRAMUSCULAR; INTRAVENOUS; SUBCUTANEOUS at 20:18

## 2022-07-01 RX ADMIN — Medication 40 MILLIEQUIVALENT(S): at 21:28

## 2022-07-01 RX ADMIN — Medication 250 MILLIGRAM(S): at 21:30

## 2022-07-01 NOTE — ED PROVIDER NOTE - CLINICAL SUMMARY MEDICAL DECISION MAKING FREE TEXT BOX
GABBI Peter MD: 62 y/o male with PMH HTN, CAD/MI s/p stents, HFrEF (EF 15-20%) s/p AICD, T2DM on insulin, h/o cellulitis/osteomyelitis LLE who p/w c/o low BP, n/v and chills x 1 day. +Increased thirst and urination. Plan for infectious/metabolic w/u, r/o DKA.

## 2022-07-01 NOTE — ED PROVIDER NOTE - OBJECTIVE STATEMENT
62 y/o male with PMH HTN, CAD/MI s/p stents, HFrEF (EF 15-20%) s/p AICD, T2DM on insulin, h/o cellulitis/osteomyelitis LLE who p/w c/o low BP, n/v and chills x 1 day. +Increased thirst and urination. no fevers cp sob abd pain.

## 2022-07-01 NOTE — ED ADULT NURSE NOTE - OBJECTIVE STATEMENT
61 y.o male c/o hypotension, NV and chills x1 day. Endorses increase in fatigue, as per pt wife, pt sleeping a lot. Denies episodes of syncope. Denies CP, SOB, blood in emesis/stool, abdominal pain, fevers. PMH HTN, CAD/MI s/p stents, HF s/p AICD placement, T2DM, cellulitis and osteomyelitis of LLE. Pt A&Ox3. Denies dizziness/lightheadness

## 2022-07-02 DIAGNOSIS — N17.9 ACUTE KIDNEY FAILURE, UNSPECIFIED: ICD-10-CM

## 2022-07-02 DIAGNOSIS — I50.22 CHRONIC SYSTOLIC (CONGESTIVE) HEART FAILURE: ICD-10-CM

## 2022-07-02 DIAGNOSIS — R50.9 FEVER, UNSPECIFIED: ICD-10-CM

## 2022-07-02 DIAGNOSIS — I95.9 HYPOTENSION, UNSPECIFIED: ICD-10-CM

## 2022-07-02 DIAGNOSIS — E11.9 TYPE 2 DIABETES MELLITUS WITHOUT COMPLICATIONS: ICD-10-CM

## 2022-07-02 DIAGNOSIS — Z29.9 ENCOUNTER FOR PROPHYLACTIC MEASURES, UNSPECIFIED: ICD-10-CM

## 2022-07-02 DIAGNOSIS — I25.10 ATHEROSCLEROTIC HEART DISEASE OF NATIVE CORONARY ARTERY WITHOUT ANGINA PECTORIS: ICD-10-CM

## 2022-07-02 DIAGNOSIS — I10 ESSENTIAL (PRIMARY) HYPERTENSION: ICD-10-CM

## 2022-07-02 LAB
ANION GAP SERPL CALC-SCNC: 16 MMOL/L — SIGNIFICANT CHANGE UP (ref 5–17)
BUN SERPL-MCNC: 64 MG/DL — HIGH (ref 7–23)
CALCIUM SERPL-MCNC: 9.1 MG/DL — SIGNIFICANT CHANGE UP (ref 8.4–10.5)
CHLORIDE SERPL-SCNC: 98 MMOL/L — SIGNIFICANT CHANGE UP (ref 96–108)
CO2 SERPL-SCNC: 20 MMOL/L — LOW (ref 22–31)
CREAT SERPL-MCNC: 2.2 MG/DL — HIGH (ref 0.5–1.3)
CRP SERPL-MCNC: 58 MG/L — HIGH (ref 0–4)
CULTURE RESULTS: SIGNIFICANT CHANGE UP
EGFR: 34 ML/MIN/1.73M2 — LOW
ERYTHROCYTE [SEDIMENTATION RATE] IN BLOOD: 92 MM/HR — HIGH (ref 0–20)
GLUCOSE BLDC GLUCOMTR-MCNC: 201 MG/DL — HIGH (ref 70–99)
GLUCOSE BLDC GLUCOMTR-MCNC: 249 MG/DL — HIGH (ref 70–99)
GLUCOSE BLDC GLUCOMTR-MCNC: 253 MG/DL — HIGH (ref 70–99)
GLUCOSE BLDC GLUCOMTR-MCNC: 256 MG/DL — HIGH (ref 70–99)
GLUCOSE SERPL-MCNC: 192 MG/DL — HIGH (ref 70–99)
HCT VFR BLD CALC: 41.9 % — SIGNIFICANT CHANGE UP (ref 39–50)
HGB BLD-MCNC: 12.5 G/DL — LOW (ref 13–17)
MAGNESIUM SERPL-MCNC: 2.1 MG/DL — SIGNIFICANT CHANGE UP (ref 1.6–2.6)
MCHC RBC-ENTMCNC: 21.5 PG — LOW (ref 27–34)
MCHC RBC-ENTMCNC: 29.8 GM/DL — LOW (ref 32–36)
MCV RBC AUTO: 72 FL — LOW (ref 80–100)
MRSA PCR RESULT.: SIGNIFICANT CHANGE UP
NRBC # BLD: 0 /100 WBCS — SIGNIFICANT CHANGE UP (ref 0–0)
NT-PROBNP SERPL-SCNC: 1604 PG/ML — HIGH (ref 0–300)
PLATELET # BLD AUTO: 141 K/UL — LOW (ref 150–400)
POTASSIUM SERPL-MCNC: 3.3 MMOL/L — LOW (ref 3.5–5.3)
POTASSIUM SERPL-SCNC: 3.3 MMOL/L — LOW (ref 3.5–5.3)
PROCALCITONIN SERPL-MCNC: 1.06 NG/ML — HIGH (ref 0.02–0.1)
RBC # BLD: 5.82 M/UL — HIGH (ref 4.2–5.8)
RBC # FLD: 20.7 % — HIGH (ref 10.3–14.5)
S AUREUS DNA NOSE QL NAA+PROBE: SIGNIFICANT CHANGE UP
SARS-COV-2 RNA SPEC QL NAA+PROBE: SIGNIFICANT CHANGE UP
SODIUM SERPL-SCNC: 134 MMOL/L — LOW (ref 135–145)
SPECIMEN SOURCE: SIGNIFICANT CHANGE UP
WBC # BLD: 7.39 K/UL — SIGNIFICANT CHANGE UP (ref 3.8–10.5)
WBC # FLD AUTO: 7.39 K/UL — SIGNIFICANT CHANGE UP (ref 3.8–10.5)

## 2022-07-02 PROCEDURE — 76770 US EXAM ABDO BACK WALL COMP: CPT | Mod: 26

## 2022-07-02 PROCEDURE — 71250 CT THORAX DX C-: CPT | Mod: 26

## 2022-07-02 PROCEDURE — 74176 CT ABD & PELVIS W/O CONTRAST: CPT | Mod: 26

## 2022-07-02 PROCEDURE — 99223 1ST HOSP IP/OBS HIGH 75: CPT

## 2022-07-02 RX ORDER — METRONIDAZOLE 500 MG
500 TABLET ORAL ONCE
Refills: 0 | Status: COMPLETED | OUTPATIENT
Start: 2022-07-02 | End: 2022-07-02

## 2022-07-02 RX ORDER — DEXTROSE 50 % IN WATER 50 %
25 SYRINGE (ML) INTRAVENOUS ONCE
Refills: 0 | Status: DISCONTINUED | OUTPATIENT
Start: 2022-07-02 | End: 2022-07-05

## 2022-07-02 RX ORDER — ASPIRIN/CALCIUM CARB/MAGNESIUM 324 MG
81 TABLET ORAL DAILY
Refills: 0 | Status: DISCONTINUED | OUTPATIENT
Start: 2022-07-02 | End: 2022-07-05

## 2022-07-02 RX ORDER — INSULIN LISPRO 100/ML
VIAL (ML) SUBCUTANEOUS
Refills: 0 | Status: DISCONTINUED | OUTPATIENT
Start: 2022-07-02 | End: 2022-07-05

## 2022-07-02 RX ORDER — CEFEPIME 1 G/1
2000 INJECTION, POWDER, FOR SOLUTION INTRAMUSCULAR; INTRAVENOUS ONCE
Refills: 0 | Status: COMPLETED | OUTPATIENT
Start: 2022-07-02 | End: 2022-07-02

## 2022-07-02 RX ORDER — CEFEPIME 1 G/1
INJECTION, POWDER, FOR SOLUTION INTRAMUSCULAR; INTRAVENOUS
Refills: 0 | Status: DISCONTINUED | OUTPATIENT
Start: 2022-07-02 | End: 2022-07-05

## 2022-07-02 RX ORDER — CEFEPIME 1 G/1
2000 INJECTION, POWDER, FOR SOLUTION INTRAMUSCULAR; INTRAVENOUS EVERY 12 HOURS
Refills: 0 | Status: DISCONTINUED | OUTPATIENT
Start: 2022-07-02 | End: 2022-07-05

## 2022-07-02 RX ORDER — INSULIN GLARGINE 100 [IU]/ML
40 INJECTION, SOLUTION SUBCUTANEOUS AT BEDTIME
Refills: 0 | Status: DISCONTINUED | OUTPATIENT
Start: 2022-07-02 | End: 2022-07-05

## 2022-07-02 RX ORDER — LANOLIN ALCOHOL/MO/W.PET/CERES
3 CREAM (GRAM) TOPICAL AT BEDTIME
Refills: 0 | Status: DISCONTINUED | OUTPATIENT
Start: 2022-07-02 | End: 2022-07-05

## 2022-07-02 RX ORDER — VANCOMYCIN HCL 1 G
1750 VIAL (EA) INTRAVENOUS ONCE
Refills: 0 | Status: COMPLETED | OUTPATIENT
Start: 2022-07-02 | End: 2022-07-02

## 2022-07-02 RX ORDER — INSULIN LISPRO 100/ML
10 VIAL (ML) SUBCUTANEOUS
Qty: 0 | Refills: 0 | DISCHARGE

## 2022-07-02 RX ORDER — INSULIN GLARGINE 100 [IU]/ML
65 INJECTION, SOLUTION SUBCUTANEOUS
Qty: 0 | Refills: 0 | DISCHARGE

## 2022-07-02 RX ORDER — HEPARIN SODIUM 5000 [USP'U]/ML
5000 INJECTION INTRAVENOUS; SUBCUTANEOUS EVERY 8 HOURS
Refills: 0 | Status: DISCONTINUED | OUTPATIENT
Start: 2022-07-02 | End: 2022-07-05

## 2022-07-02 RX ORDER — TAMSULOSIN HYDROCHLORIDE 0.4 MG/1
0.4 CAPSULE ORAL AT BEDTIME
Refills: 0 | Status: DISCONTINUED | OUTPATIENT
Start: 2022-07-02 | End: 2022-07-05

## 2022-07-02 RX ORDER — METRONIDAZOLE 500 MG
500 TABLET ORAL EVERY 8 HOURS
Refills: 0 | Status: DISCONTINUED | OUTPATIENT
Start: 2022-07-02 | End: 2022-07-05

## 2022-07-02 RX ORDER — PANTOPRAZOLE SODIUM 20 MG/1
40 TABLET, DELAYED RELEASE ORAL
Refills: 0 | Status: DISCONTINUED | OUTPATIENT
Start: 2022-07-02 | End: 2022-07-05

## 2022-07-02 RX ORDER — METRONIDAZOLE 500 MG
TABLET ORAL
Refills: 0 | Status: DISCONTINUED | OUTPATIENT
Start: 2022-07-02 | End: 2022-07-05

## 2022-07-02 RX ORDER — ATORVASTATIN CALCIUM 80 MG/1
80 TABLET, FILM COATED ORAL AT BEDTIME
Refills: 0 | Status: DISCONTINUED | OUTPATIENT
Start: 2022-07-02 | End: 2022-07-05

## 2022-07-02 RX ORDER — SODIUM CHLORIDE 9 MG/ML
500 INJECTION, SOLUTION INTRAVENOUS ONCE
Refills: 0 | Status: COMPLETED | OUTPATIENT
Start: 2022-07-02 | End: 2022-07-02

## 2022-07-02 RX ORDER — SODIUM CHLORIDE 9 MG/ML
1000 INJECTION, SOLUTION INTRAVENOUS
Refills: 0 | Status: DISCONTINUED | OUTPATIENT
Start: 2022-07-02 | End: 2022-07-05

## 2022-07-02 RX ORDER — CLOPIDOGREL BISULFATE 75 MG/1
75 TABLET, FILM COATED ORAL DAILY
Refills: 0 | Status: DISCONTINUED | OUTPATIENT
Start: 2022-07-02 | End: 2022-07-05

## 2022-07-02 RX ORDER — GLUCAGON INJECTION, SOLUTION 0.5 MG/.1ML
1 INJECTION, SOLUTION SUBCUTANEOUS ONCE
Refills: 0 | Status: DISCONTINUED | OUTPATIENT
Start: 2022-07-02 | End: 2022-07-05

## 2022-07-02 RX ORDER — SODIUM CHLORIDE 9 MG/ML
500 INJECTION INTRAMUSCULAR; INTRAVENOUS; SUBCUTANEOUS ONCE
Refills: 0 | Status: COMPLETED | OUTPATIENT
Start: 2022-07-02 | End: 2022-07-02

## 2022-07-02 RX ORDER — DEXTROSE 50 % IN WATER 50 %
15 SYRINGE (ML) INTRAVENOUS ONCE
Refills: 0 | Status: DISCONTINUED | OUTPATIENT
Start: 2022-07-02 | End: 2022-07-05

## 2022-07-02 RX ORDER — VANCOMYCIN HCL 1 G
VIAL (EA) INTRAVENOUS
Refills: 0 | Status: DISCONTINUED | OUTPATIENT
Start: 2022-07-02 | End: 2022-07-05

## 2022-07-02 RX ORDER — ACETAMINOPHEN 500 MG
650 TABLET ORAL EVERY 6 HOURS
Refills: 0 | Status: DISCONTINUED | OUTPATIENT
Start: 2022-07-02 | End: 2022-07-05

## 2022-07-02 RX ORDER — VANCOMYCIN HCL 1 G
1750 VIAL (EA) INTRAVENOUS EVERY 12 HOURS
Refills: 0 | Status: DISCONTINUED | OUTPATIENT
Start: 2022-07-02 | End: 2022-07-05

## 2022-07-02 RX ORDER — ONDANSETRON 8 MG/1
4 TABLET, FILM COATED ORAL EVERY 8 HOURS
Refills: 0 | Status: DISCONTINUED | OUTPATIENT
Start: 2022-07-02 | End: 2022-07-05

## 2022-07-02 RX ADMIN — Medication 3: at 12:16

## 2022-07-02 RX ADMIN — Medication 650 MILLIGRAM(S): at 18:23

## 2022-07-02 RX ADMIN — Medication 3: at 08:44

## 2022-07-02 RX ADMIN — Medication 100 MILLIGRAM(S): at 05:07

## 2022-07-02 RX ADMIN — TAMSULOSIN HYDROCHLORIDE 0.4 MILLIGRAM(S): 0.4 CAPSULE ORAL at 22:56

## 2022-07-02 RX ADMIN — SODIUM CHLORIDE 1000 MILLILITER(S): 9 INJECTION, SOLUTION INTRAVENOUS at 04:59

## 2022-07-02 RX ADMIN — INSULIN GLARGINE 40 UNIT(S): 100 INJECTION, SOLUTION SUBCUTANEOUS at 22:56

## 2022-07-02 RX ADMIN — Medication 81 MILLIGRAM(S): at 11:48

## 2022-07-02 RX ADMIN — Medication 100 MILLIGRAM(S): at 13:46

## 2022-07-02 RX ADMIN — HEPARIN SODIUM 5000 UNIT(S): 5000 INJECTION INTRAVENOUS; SUBCUTANEOUS at 22:57

## 2022-07-02 RX ADMIN — Medication 250 MILLIGRAM(S): at 07:09

## 2022-07-02 RX ADMIN — Medication 100 MILLIGRAM(S): at 22:56

## 2022-07-02 RX ADMIN — CLOPIDOGREL BISULFATE 75 MILLIGRAM(S): 75 TABLET, FILM COATED ORAL at 11:48

## 2022-07-02 RX ADMIN — HEPARIN SODIUM 5000 UNIT(S): 5000 INJECTION INTRAVENOUS; SUBCUTANEOUS at 07:11

## 2022-07-02 RX ADMIN — SODIUM CHLORIDE 100 MILLILITER(S): 9 INJECTION, SOLUTION INTRAVENOUS at 06:34

## 2022-07-02 RX ADMIN — Medication 650 MILLIGRAM(S): at 17:55

## 2022-07-02 RX ADMIN — ATORVASTATIN CALCIUM 80 MILLIGRAM(S): 80 TABLET, FILM COATED ORAL at 22:56

## 2022-07-02 RX ADMIN — Medication 2: at 17:10

## 2022-07-02 RX ADMIN — CEFEPIME 100 MILLIGRAM(S): 1 INJECTION, POWDER, FOR SOLUTION INTRAMUSCULAR; INTRAVENOUS at 06:29

## 2022-07-02 RX ADMIN — HEPARIN SODIUM 5000 UNIT(S): 5000 INJECTION INTRAVENOUS; SUBCUTANEOUS at 13:46

## 2022-07-02 RX ADMIN — Medication 650 MILLIGRAM(S): at 07:20

## 2022-07-02 RX ADMIN — PANTOPRAZOLE SODIUM 40 MILLIGRAM(S): 20 TABLET, DELAYED RELEASE ORAL at 07:15

## 2022-07-02 RX ADMIN — SODIUM CHLORIDE 500 MILLILITER(S): 9 INJECTION INTRAMUSCULAR; INTRAVENOUS; SUBCUTANEOUS at 01:44

## 2022-07-02 RX ADMIN — Medication 650 MILLIGRAM(S): at 07:51

## 2022-07-02 RX ADMIN — Medication 250 MILLIGRAM(S): at 17:10

## 2022-07-02 RX ADMIN — CEFEPIME 100 MILLIGRAM(S): 1 INJECTION, POWDER, FOR SOLUTION INTRAMUSCULAR; INTRAVENOUS at 17:10

## 2022-07-02 NOTE — PROGRESS NOTE ADULT - PROBLEM SELECTOR PLAN 1
L foot submetatarsal wound not improving no PO abx, now with worsening swelling and erythema tracking proximally, c/w cellulitis.  and   - podiatry following  id consulted  abx as per id - plan dc on oral augmentin x 5 more days   mri noted with osteo   s/p left foot partial 3rd ray resection 1/21  post op care as per pod  f/u OR cult  PT

## 2022-07-02 NOTE — H&P ADULT - PROBLEM SELECTOR PLAN 4
-on lantus 60mg qhs at home  -will c/w lantus 40u qhs; and can uptitrate to goal FS:140-180  -FS AC HS  -AISS

## 2022-07-02 NOTE — H&P ADULT - PROBLEM SELECTOR PLAN 2
-s/p AICD  -currently on metoprolol succ 50mg qd, entresto 49-51mg BID  -will hold GDMT for now in setting of hypotension  -please re-start once BP stable  -monitor electrolytes

## 2022-07-02 NOTE — H&P ADULT - HISTORY OF PRESENT ILLNESS
61M PMH HTN, CAD/MI s/p stents, HFrEF s/p AICD, T2DM, h/o Osteomyelitis s/p left toe amputation presenting with low BP. Pt reports he had felt dizzy at home while laying down and his wife took his BP and found it to be low. He cannot recall the number. Reports having chills for the past few days but no fevers. He has also had a cough mildly productive of whitish sputum for the past 1-month he attributes to allergies. Denied neck stiffness, photophobia, SOB, CP, abdominal pain, diarrhea, dysuria, open wounds.    ED course: Febrile to 100.3. tachy to 101 SBP:. Given vanc, zosyn, 500cc NS bolus x2

## 2022-07-02 NOTE — H&P ADULT - PROBLEM SELECTOR PLAN 1
-unknown etiology at this time; ddx includes occult infection, malignancy, IE vs rheum dz  -CXR clear and UA neg  -will obtain panCT  -s/p vanc, zosyn in ED; will c/w Vanc, cefepime, flagyl empirically for now as procal elevated  -s/p NS 500cc boluses; will keep on maintenance fluids for a few hours  -TTE ordered

## 2022-07-02 NOTE — PATIENT PROFILE ADULT - FALL HARM RISK - UNIVERSAL INTERVENTIONS
Bed in lowest position, wheels locked, appropriate side rails in place/Call bell, personal items and telephone in reach/Instruct patient to call for assistance before getting out of bed or chair/Non-slip footwear when patient is out of bed/[place] to call system/Physically safe environment - no spills, clutter or unnecessary equipment/Purposeful Proactive Rounding/Room/bathroom lighting operational, light cord in reach

## 2022-07-02 NOTE — H&P ADULT - NSHPPHYSICALEXAM_GEN_ALL_CORE
Vital Signs Last 24 Hrs  T(C): 36.6 (02 Jul 2022 03:36), Max: 37.9 (01 Jul 2022 19:30)  T(F): 97.9 (02 Jul 2022 03:36), Max: 100.3 (01 Jul 2022 19:30)  HR: 97 (02 Jul 2022 03:36) (91 - 101)  BP: 99/68 (02 Jul 2022 03:36) (77/53 - 100/68)  BP(mean): 78 (02 Jul 2022 02:57) (67 - 78)  RR: 18 (02 Jul 2022 03:36) (17 - 20)  SpO2: 97% (02 Jul 2022 03:36) (95% - 97%)

## 2022-07-02 NOTE — H&P ADULT - NSHPREVIEWOFSYSTEMS_GEN_ALL_CORE
GEN: no night sweats or change in appetite  EYES: no changes in vision or diplopia   ENT: no epistaxis, sinus pain, gingival bleeding, odynophagia or dysphagia  CV: no CP, PND or palpitations  RESP: no cough, wheezing, or hemoptysis  GI: no hematemesis, hematochezia, or melena  : no dysuria, polyuria, or hematuria  MSK: no arthralgias or joint swelling   NEURO: no gross sensory changes, numbness, focal deficits  PSYCH: no depression or changes in concentration  HEME/ONC: no purpura, petechiae or night sweats  SKIN: no pruritus, hair loss or skin lesions

## 2022-07-02 NOTE — H&P ADULT - ASSESSMENT
61M PMH HTN, CAD/MI s/p stents, HFrEF s/p AICD, T2DM, h/o Osteomyelitis s/p left toe amputation presenting with low BP. Found to have hypotension and low-grade fever. Admitted for FUO

## 2022-07-02 NOTE — H&P ADULT - PROBLEM SELECTOR PLAN 5
-suspect prerenal etiology in setting of possible septic shock and overdiuresis(on lasix 80mg BID)  -will provide gentle IV hydration for a few hours  -please monitor for s/s fluid overload as pt with low EF  -will obtain urine lytes and renal US  -avoid nephrotoxins and renally dose all meds  -strict I/Os

## 2022-07-02 NOTE — H&P ADULT - NSHPLABSRESULTS_GEN_ALL_CORE
12.9   6.83  )-----------( 156      ( 2022 20:08 )             41.8       07-    135  |  96  |  65<H>  ----------------------------<  123<H>  3.3<L>   |  22  |  2.51<H>    Ca    9.2      2022 20:08    TPro  7.8  /  Alb  4.1  /  TBili  0.7  /  DBili  x   /  AST  29  /  ALT  31  /  AlkPhos  99  07-            LIVER FUNCTIONS - ( 2022 20:08 )  Alb: 4.1 g/dL / Pro: 7.8 g/dL / ALK PHOS: 99 U/L / ALT: 31 U/L / AST: 29 U/L / GGT: x             PT/INR - ( 2022 20:08 )   PT: 13.9 sec;   INR: 1.20 ratio         PTT - ( 2022 20:08 )  PTT:31.4 sec    Urinalysis Basic - ( 2022 21:00 )    Color: Yellow / Appearance: Clear / S.017 / pH: x  Gluc: x / Ketone: Negative  / Bili: Negative / Urobili: Negative   Blood: x / Protein: 30 mg/dL / Nitrite: Negative   Leuk Esterase: Negative / RBC: 3 /hpf / WBC 2 /HPF   Sq Epi: x / Non Sq Epi: 1 /hpf / Bacteria: Negative        I have personally reviewed imaging, CXR w/ clear lungs  I have personally reviewed EKG

## 2022-07-03 LAB
CREAT ?TM UR-MCNC: 84 MG/DL — SIGNIFICANT CHANGE UP
GLUCOSE BLDC GLUCOMTR-MCNC: 171 MG/DL — HIGH (ref 70–99)
GLUCOSE BLDC GLUCOMTR-MCNC: 177 MG/DL — HIGH (ref 70–99)
GLUCOSE BLDC GLUCOMTR-MCNC: 193 MG/DL — HIGH (ref 70–99)
GLUCOSE BLDC GLUCOMTR-MCNC: 198 MG/DL — HIGH (ref 70–99)
UUN UR-MCNC: 861 MG/DL — SIGNIFICANT CHANGE UP
VANCOMYCIN TROUGH SERPL-MCNC: 14.3 UG/ML — SIGNIFICANT CHANGE UP (ref 10–20)

## 2022-07-03 RX ORDER — NICOTINE POLACRILEX 2 MG
1 GUM BUCCAL DAILY
Refills: 0 | Status: DISCONTINUED | OUTPATIENT
Start: 2022-07-03 | End: 2022-07-05

## 2022-07-03 RX ADMIN — Medication 250 MILLIGRAM(S): at 18:41

## 2022-07-03 RX ADMIN — Medication 1: at 12:21

## 2022-07-03 RX ADMIN — Medication 100 MILLIGRAM(S): at 15:52

## 2022-07-03 RX ADMIN — INSULIN GLARGINE 40 UNIT(S): 100 INJECTION, SOLUTION SUBCUTANEOUS at 21:40

## 2022-07-03 RX ADMIN — CEFEPIME 100 MILLIGRAM(S): 1 INJECTION, POWDER, FOR SOLUTION INTRAMUSCULAR; INTRAVENOUS at 04:56

## 2022-07-03 RX ADMIN — ATORVASTATIN CALCIUM 80 MILLIGRAM(S): 80 TABLET, FILM COATED ORAL at 21:40

## 2022-07-03 RX ADMIN — CLOPIDOGREL BISULFATE 75 MILLIGRAM(S): 75 TABLET, FILM COATED ORAL at 12:22

## 2022-07-03 RX ADMIN — CEFEPIME 100 MILLIGRAM(S): 1 INJECTION, POWDER, FOR SOLUTION INTRAMUSCULAR; INTRAVENOUS at 17:46

## 2022-07-03 RX ADMIN — Medication 81 MILLIGRAM(S): at 12:22

## 2022-07-03 RX ADMIN — Medication 100 MILLIGRAM(S): at 18:42

## 2022-07-03 RX ADMIN — PANTOPRAZOLE SODIUM 40 MILLIGRAM(S): 20 TABLET, DELAYED RELEASE ORAL at 04:55

## 2022-07-03 RX ADMIN — HEPARIN SODIUM 5000 UNIT(S): 5000 INJECTION INTRAVENOUS; SUBCUTANEOUS at 04:55

## 2022-07-03 RX ADMIN — Medication 1: at 08:29

## 2022-07-03 RX ADMIN — Medication 1: at 17:04

## 2022-07-03 RX ADMIN — TAMSULOSIN HYDROCHLORIDE 0.4 MILLIGRAM(S): 0.4 CAPSULE ORAL at 21:39

## 2022-07-03 RX ADMIN — HEPARIN SODIUM 5000 UNIT(S): 5000 INJECTION INTRAVENOUS; SUBCUTANEOUS at 14:05

## 2022-07-03 RX ADMIN — Medication 650 MILLIGRAM(S): at 01:10

## 2022-07-03 RX ADMIN — Medication 100 MILLIGRAM(S): at 04:57

## 2022-07-03 RX ADMIN — Medication 250 MILLIGRAM(S): at 04:56

## 2022-07-03 RX ADMIN — Medication 650 MILLIGRAM(S): at 00:10

## 2022-07-03 RX ADMIN — Medication 100 MILLIGRAM(S): at 21:40

## 2022-07-03 RX ADMIN — HEPARIN SODIUM 5000 UNIT(S): 5000 INJECTION INTRAVENOUS; SUBCUTANEOUS at 21:41

## 2022-07-03 NOTE — PROGRESS NOTE ADULT - PROBLEM SELECTOR PLAN 2
septic with tachycardia and leukocytosis with elevated lactate 2.5 in setting of cellulitis/OM of LLE   - s/p 2L IVF in ED with improvement of lactate   - c/w abx as above   - f/u all cx data  - defer further IVF 2/2 HF   - mentating well, extremities warm/perfused
septic with tachycardia and leukocytosis with elevated lactate 2.5 in setting of cellulitis/OM of LLE   - s/p 2L IVF in ED with improvement of lactate   - c/w abx as above   - f/u all cx data  - defer further IVF 2/2 HF   - mentating well, extremities warm/perfused

## 2022-07-03 NOTE — PROGRESS NOTE ADULT - PROBLEM SELECTOR PLAN 7
CAD s/p stents   no CP, no SOB  c/w ASA, plavix and statin
CAD s/p stents   no CP, no SOB  c/w ASA, plavix and statin

## 2022-07-03 NOTE — PROGRESS NOTE ADULT - PROBLEM SELECTOR PLAN 5
resume home dose insulin - lantus 60units with bedtime and admelog sliding scale with meals   monitor FS ac and hs   titrate as needed  endo following
resume home dose insulin - lantus 60units with bedtime and admelog sliding scale with meals   monitor FS ac and hs   titrate as needed  endo following

## 2022-07-03 NOTE — PROGRESS NOTE ADULT - ASSESSMENT
61M with PMH of HTN, CAD/MI s/p stents, HFrEF (EF 15-20%) s/p AICD, T2DM on insulin p/w non healing foot ulcer after bunionectomy 5weeks ago, now with acutely worsening pain, swelling and erythema, a/w sepsis 2/2 cellulitis and r/o OM.  61M with PMH of HTN, CAD/MI s/p stents, HFrEF (EF 15-20%) s/p AICD, T2DM on insulin p/w non healing foot ulcer after bunionectomy 5weeks ago, now with acutely worsening pain, swelling and erythema, a/w sepsis 2/2 cellulitis and r/o OM.     Problem/Plan - 1:  ·  Problem: FUO (fever of unknown origin).   ·  Plan: -unknown etiology at this time; ddx includes occult infection, malignancy, IE vs rheum dz  -CXR clear and UA neg  -will obtain panCT  -s/p vanc, zosyn in ED; will c/w Vanc, cefepime, flagyl empirically for now as procal elevated  -s/p NS 500cc boluses; will keep on maintenance fluids for a few hours  -TTE ordered.     Problem/Plan - 2:  ·  Problem: Chronic systolic congestive heart failure.   ·  Plan: -s/p AICD  -currently on metoprolol succ 50mg qd, entresto 49-51mg BID  -please re-start once BP stable  -monitor electrolytes.     Problem/Plan - 3:  ·  Problem: CAD S/P percutaneous coronary angioplasty.   ·  Plan: -c/w ASA , plavix and statin.     Problem/Plan - 4:  ·  Problem: T2DM (type 2 diabetes mellitus).   ·  Plan: -on lantus 60mg qhs at home  -will c/w lantus 40u qhs; and can uptitrate to goal FS:140-180  -FS AC HS  -AISS.       MANJIT (acute kidney injury).   ·  Plan: -suspect prerenal etiology in setting of possible septic shock and overdiuresis(on lasix 80mg BID)  -will provide gentle IV hydration for a few hours  -please monitor for s/s fluid overload as pt with low EF  -will obtain urine lytes and renal US  -avoid nephrotoxins and renally dose all meds  -strict I/Os.  ·  Problem: HTN (hypertension).   ·  Plan: -hold antiHTN in s/o hypotension.  ·  Problem: Prophylactic measure.

## 2022-07-03 NOTE — PROGRESS NOTE ADULT - PROBLEM SELECTOR PLAN 4
h/o HFrEF with EF 15-20%, s/p AICD  currently appears euvolemic   resume home cardiac meds  monitor volume status, resume as tolerated   cards f/u dr mirza
h/o HFrEF with EF 15-20%, s/p AICD  currently appears euvolemic   resume home cardiac meds  monitor volume status, resume as tolerated   cards f/u dr mirza

## 2022-07-03 NOTE — PROGRESS NOTE ADULT - PROBLEM SELECTOR PLAN 3
MANJIT vs CKD, Cr 2.5 with last baseline ~1.5 (in 2020) - possibly in setting of sepsis   - check urine lytes  - was holding entresto and lasix for now   - s/p 2L in ED, will defer further IVF 2/2 HF with EF 15-20%  - strict I/Os  -  renal f/u  creat improved  resumed lasix and entresto
MANJIT vs CKD, Cr 2.5 with last baseline ~1.5 (in 2020) - possibly in setting of sepsis   - check urine lytes  - was holding entresto and lasix for now   - s/p 2L in ED, will defer further IVF 2/2 HF with EF 15-20%  - strict I/Os  -  renal f/u  creat improved  resumed lasix and entresto

## 2022-07-04 LAB
ANION GAP SERPL CALC-SCNC: 14 MMOL/L — SIGNIFICANT CHANGE UP (ref 5–17)
BUN SERPL-MCNC: 37 MG/DL — HIGH (ref 7–23)
CALCIUM SERPL-MCNC: 8.9 MG/DL — SIGNIFICANT CHANGE UP (ref 8.4–10.5)
CHLORIDE SERPL-SCNC: 98 MMOL/L — SIGNIFICANT CHANGE UP (ref 96–108)
CO2 SERPL-SCNC: 22 MMOL/L — SIGNIFICANT CHANGE UP (ref 22–31)
CREAT SERPL-MCNC: 1.5 MG/DL — HIGH (ref 0.5–1.3)
EGFR: 53 ML/MIN/1.73M2 — LOW
EOSINOPHIL NFR URNS MANUAL: NEGATIVE — SIGNIFICANT CHANGE UP
GLUCOSE BLDC GLUCOMTR-MCNC: 193 MG/DL — HIGH (ref 70–99)
GLUCOSE BLDC GLUCOMTR-MCNC: 204 MG/DL — HIGH (ref 70–99)
GLUCOSE BLDC GLUCOMTR-MCNC: 248 MG/DL — HIGH (ref 70–99)
GLUCOSE BLDC GLUCOMTR-MCNC: 262 MG/DL — HIGH (ref 70–99)
GLUCOSE SERPL-MCNC: 172 MG/DL — HIGH (ref 70–99)
LEGIONELLA AG UR QL: NEGATIVE — SIGNIFICANT CHANGE UP
MAGNESIUM SERPL-MCNC: 2.2 MG/DL — SIGNIFICANT CHANGE UP (ref 1.6–2.6)
POTASSIUM SERPL-MCNC: 3.4 MMOL/L — LOW (ref 3.5–5.3)
POTASSIUM SERPL-SCNC: 3.4 MMOL/L — LOW (ref 3.5–5.3)
S PNEUM AG UR QL: NEGATIVE — SIGNIFICANT CHANGE UP
SODIUM SERPL-SCNC: 134 MMOL/L — LOW (ref 135–145)

## 2022-07-04 RX ADMIN — HEPARIN SODIUM 5000 UNIT(S): 5000 INJECTION INTRAVENOUS; SUBCUTANEOUS at 22:02

## 2022-07-04 RX ADMIN — Medication 2: at 08:45

## 2022-07-04 RX ADMIN — CEFEPIME 100 MILLIGRAM(S): 1 INJECTION, POWDER, FOR SOLUTION INTRAMUSCULAR; INTRAVENOUS at 04:41

## 2022-07-04 RX ADMIN — TAMSULOSIN HYDROCHLORIDE 0.4 MILLIGRAM(S): 0.4 CAPSULE ORAL at 22:01

## 2022-07-04 RX ADMIN — PANTOPRAZOLE SODIUM 40 MILLIGRAM(S): 20 TABLET, DELAYED RELEASE ORAL at 05:42

## 2022-07-04 RX ADMIN — CLOPIDOGREL BISULFATE 75 MILLIGRAM(S): 75 TABLET, FILM COATED ORAL at 12:24

## 2022-07-04 RX ADMIN — Medication 100 MILLIGRAM(S): at 05:40

## 2022-07-04 RX ADMIN — Medication 250 MILLIGRAM(S): at 04:41

## 2022-07-04 RX ADMIN — Medication 100 MILLIGRAM(S): at 17:21

## 2022-07-04 RX ADMIN — Medication 1: at 17:22

## 2022-07-04 RX ADMIN — Medication 3: at 12:25

## 2022-07-04 RX ADMIN — Medication 250 MILLIGRAM(S): at 21:21

## 2022-07-04 RX ADMIN — INSULIN GLARGINE 40 UNIT(S): 100 INJECTION, SOLUTION SUBCUTANEOUS at 22:01

## 2022-07-04 RX ADMIN — ATORVASTATIN CALCIUM 80 MILLIGRAM(S): 80 TABLET, FILM COATED ORAL at 22:01

## 2022-07-04 RX ADMIN — Medication 81 MILLIGRAM(S): at 12:24

## 2022-07-04 RX ADMIN — CEFEPIME 100 MILLIGRAM(S): 1 INJECTION, POWDER, FOR SOLUTION INTRAMUSCULAR; INTRAVENOUS at 20:03

## 2022-07-04 RX ADMIN — HEPARIN SODIUM 5000 UNIT(S): 5000 INJECTION INTRAVENOUS; SUBCUTANEOUS at 04:41

## 2022-07-04 RX ADMIN — HEPARIN SODIUM 5000 UNIT(S): 5000 INJECTION INTRAVENOUS; SUBCUTANEOUS at 17:21

## 2022-07-04 NOTE — PROGRESS NOTE ADULT - PROBLEM SELECTOR PLAN 8
lovenox for VTE ppx   fall, aspiration precautions

## 2022-07-04 NOTE — PROGRESS NOTE ADULT - PROBLEM SELECTOR PROBLEM 5
Type 2 diabetes mellitus treated with insulin

## 2022-07-04 NOTE — PROGRESS NOTE ADULT - ASSESSMENT
61M with PMH of HTN, CAD/MI s/p stents, HFrEF (EF 15-20%) s/p AICD, T2DM on insulin p/w non healing foot ulcer after bunionectomy 5weeks ago, now with acutely worsening pain, swelling and erythema, a/w sepsis 2/2 cellulitis and r/o OM.     Problem/Plan - 1:  ·  Problem: FUO (fever of unknown origin).   ·  Plan: -unknown etiology at this time; ddx includes occult infection, malignancy, IE vs rheum dz  -CXR clear and UA neg  -s/p vanc, zosyn in ED; will c/w Vanc, cefepime, flagyl empirically for now as procal elevated  -s/p NS 500cc boluses; will keep on maintenance fluids for a few hours  -TTE ordered.    Blood cx no growth to date    Problem/Plan - 2:  ·  Problem: Chronic systolic congestive heart failure.   ·  Plan: -s/p AICD  -currently on metoprolol succ 50mg qd, entresto 49-51mg BID  -please re-start once BP stable  -monitor electrolytes.     Problem/Plan - 3:  ·  Problem: CAD S/P percutaneous coronary angioplasty.   ·  Plan: -c/w ASA , plavix and statin.     Problem/Plan - 4:  ·  Problem: T2DM (type 2 diabetes mellitus).   ·  Plan: -on lantus 60mg qhs at home  -will c/w lantus 40u qhs; and can uptitrate to goal FS:140-180  -FS AC HS  -AISS.       MANJIT (acute kidney injury).   ·  Plan: -suspect prerenal etiology in setting of possible septic shock and overdiuresis(on lasix 80mg BID)  -will provide gentle IV hydration for a few hours  -please monitor for s/s fluid overload as pt with low EF  -will obtain urine lytes and renal US  -avoid nephrotoxins and renally dose all meds  Renal consult called   -strict I/Os.  ·  Problem: HTN (hypertension).   ·  Plan: -hold antiHTN in s/o hypotension.  ·  Problem: Prophylactic measure.

## 2022-07-05 ENCOUNTER — TRANSCRIPTION ENCOUNTER (OUTPATIENT)
Age: 62
End: 2022-07-05

## 2022-07-05 VITALS
RESPIRATION RATE: 18 BRPM | TEMPERATURE: 99 F | DIASTOLIC BLOOD PRESSURE: 76 MMHG | OXYGEN SATURATION: 96 % | HEART RATE: 106 BPM | SYSTOLIC BLOOD PRESSURE: 119 MMHG

## 2022-07-05 DIAGNOSIS — I95.9 HYPOTENSION, UNSPECIFIED: ICD-10-CM

## 2022-07-05 LAB
ANION GAP SERPL CALC-SCNC: 14 MMOL/L — SIGNIFICANT CHANGE UP (ref 5–17)
BUN SERPL-MCNC: 34 MG/DL — HIGH (ref 7–23)
CALCIUM SERPL-MCNC: 9.1 MG/DL — SIGNIFICANT CHANGE UP (ref 8.4–10.5)
CHLORIDE SERPL-SCNC: 100 MMOL/L — SIGNIFICANT CHANGE UP (ref 96–108)
CO2 SERPL-SCNC: 22 MMOL/L — SIGNIFICANT CHANGE UP (ref 22–31)
CREAT SERPL-MCNC: 1.41 MG/DL — HIGH (ref 0.5–1.3)
EGFR: 57 ML/MIN/1.73M2 — LOW
GLUCOSE BLDC GLUCOMTR-MCNC: 141 MG/DL — HIGH (ref 70–99)
GLUCOSE BLDC GLUCOMTR-MCNC: 194 MG/DL — HIGH (ref 70–99)
GLUCOSE BLDC GLUCOMTR-MCNC: 202 MG/DL — HIGH (ref 70–99)
GLUCOSE SERPL-MCNC: 193 MG/DL — HIGH (ref 70–99)
HCT VFR BLD CALC: 36 % — LOW (ref 39–50)
HGB BLD-MCNC: 11 G/DL — LOW (ref 13–17)
MCHC RBC-ENTMCNC: 21.5 PG — LOW (ref 27–34)
MCHC RBC-ENTMCNC: 30.6 GM/DL — LOW (ref 32–36)
MCV RBC AUTO: 70.3 FL — LOW (ref 80–100)
NRBC # BLD: 0 /100 WBCS — SIGNIFICANT CHANGE UP (ref 0–0)
PLATELET # BLD AUTO: 125 K/UL — LOW (ref 150–400)
POTASSIUM SERPL-MCNC: 3.8 MMOL/L — SIGNIFICANT CHANGE UP (ref 3.5–5.3)
POTASSIUM SERPL-SCNC: 3.8 MMOL/L — SIGNIFICANT CHANGE UP (ref 3.5–5.3)
RBC # BLD: 5.12 M/UL — SIGNIFICANT CHANGE UP (ref 4.2–5.8)
RBC # FLD: 20.2 % — HIGH (ref 10.3–14.5)
SODIUM SERPL-SCNC: 136 MMOL/L — SIGNIFICANT CHANGE UP (ref 135–145)
WBC # BLD: 6.37 K/UL — SIGNIFICANT CHANGE UP (ref 3.8–10.5)
WBC # FLD AUTO: 6.37 K/UL — SIGNIFICANT CHANGE UP (ref 3.8–10.5)

## 2022-07-05 PROCEDURE — 87086 URINE CULTURE/COLONY COUNT: CPT

## 2022-07-05 PROCEDURE — 87899 AGENT NOS ASSAY W/OPTIC: CPT

## 2022-07-05 PROCEDURE — 85730 THROMBOPLASTIN TIME PARTIAL: CPT

## 2022-07-05 PROCEDURE — 87449 NOS EACH ORGANISM AG IA: CPT

## 2022-07-05 PROCEDURE — 86140 C-REACTIVE PROTEIN: CPT

## 2022-07-05 PROCEDURE — 81001 URINALYSIS AUTO W/SCOPE: CPT

## 2022-07-05 PROCEDURE — 80048 BASIC METABOLIC PNL TOTAL CA: CPT

## 2022-07-05 PROCEDURE — 85610 PROTHROMBIN TIME: CPT

## 2022-07-05 PROCEDURE — 87640 STAPH A DNA AMP PROBE: CPT

## 2022-07-05 PROCEDURE — 96375 TX/PRO/DX INJ NEW DRUG ADDON: CPT

## 2022-07-05 PROCEDURE — 84145 PROCALCITONIN (PCT): CPT

## 2022-07-05 PROCEDURE — 76770 US EXAM ABDO BACK WALL COMP: CPT

## 2022-07-05 PROCEDURE — 71045 X-RAY EXAM CHEST 1 VIEW: CPT

## 2022-07-05 PROCEDURE — 84540 ASSAY OF URINE/UREA-N: CPT

## 2022-07-05 PROCEDURE — 84295 ASSAY OF SERUM SODIUM: CPT

## 2022-07-05 PROCEDURE — 82010 KETONE BODYS QUAN: CPT

## 2022-07-05 PROCEDURE — 83605 ASSAY OF LACTIC ACID: CPT

## 2022-07-05 PROCEDURE — 87641 MR-STAPH DNA AMP PROBE: CPT

## 2022-07-05 PROCEDURE — 82803 BLOOD GASES ANY COMBINATION: CPT

## 2022-07-05 PROCEDURE — 36415 COLL VENOUS BLD VENIPUNCTURE: CPT

## 2022-07-05 PROCEDURE — 85027 COMPLETE CBC AUTOMATED: CPT

## 2022-07-05 PROCEDURE — 84132 ASSAY OF SERUM POTASSIUM: CPT

## 2022-07-05 PROCEDURE — 99285 EMERGENCY DEPT VISIT HI MDM: CPT | Mod: 25

## 2022-07-05 PROCEDURE — 96374 THER/PROPH/DIAG INJ IV PUSH: CPT

## 2022-07-05 PROCEDURE — 71250 CT THORAX DX C-: CPT

## 2022-07-05 PROCEDURE — 82947 ASSAY GLUCOSE BLOOD QUANT: CPT

## 2022-07-05 PROCEDURE — 87205 SMEAR GRAM STAIN: CPT

## 2022-07-05 PROCEDURE — U0003: CPT

## 2022-07-05 PROCEDURE — 83880 ASSAY OF NATRIURETIC PEPTIDE: CPT

## 2022-07-05 PROCEDURE — 82570 ASSAY OF URINE CREATININE: CPT

## 2022-07-05 PROCEDURE — 85652 RBC SED RATE AUTOMATED: CPT

## 2022-07-05 PROCEDURE — 80202 ASSAY OF VANCOMYCIN: CPT

## 2022-07-05 PROCEDURE — 74176 CT ABD & PELVIS W/O CONTRAST: CPT

## 2022-07-05 PROCEDURE — 83735 ASSAY OF MAGNESIUM: CPT

## 2022-07-05 PROCEDURE — 85018 HEMOGLOBIN: CPT

## 2022-07-05 PROCEDURE — 87040 BLOOD CULTURE FOR BACTERIA: CPT

## 2022-07-05 PROCEDURE — 99222 1ST HOSP IP/OBS MODERATE 55: CPT

## 2022-07-05 PROCEDURE — 82330 ASSAY OF CALCIUM: CPT

## 2022-07-05 PROCEDURE — 82435 ASSAY OF BLOOD CHLORIDE: CPT

## 2022-07-05 PROCEDURE — U0005: CPT

## 2022-07-05 PROCEDURE — 85014 HEMATOCRIT: CPT

## 2022-07-05 PROCEDURE — 85025 COMPLETE CBC W/AUTO DIFF WBC: CPT

## 2022-07-05 PROCEDURE — 82962 GLUCOSE BLOOD TEST: CPT

## 2022-07-05 PROCEDURE — 80053 COMPREHEN METABOLIC PANEL: CPT

## 2022-07-05 RX ORDER — INSULIN GLARGINE 100 [IU]/ML
60 INJECTION, SOLUTION SUBCUTANEOUS
Qty: 0 | Refills: 0 | DISCHARGE

## 2022-07-05 RX ORDER — INSULIN GLARGINE 100 [IU]/ML
40 INJECTION, SOLUTION SUBCUTANEOUS
Qty: 0 | Refills: 0 | DISCHARGE
Start: 2022-07-05

## 2022-07-05 RX ORDER — INDAPAMIDE 1.25 MG
1 TABLET ORAL
Qty: 0 | Refills: 0 | DISCHARGE

## 2022-07-05 RX ORDER — SACUBITRIL AND VALSARTAN 24; 26 MG/1; MG/1
1 TABLET, FILM COATED ORAL
Qty: 0 | Refills: 0 | DISCHARGE

## 2022-07-05 RX ORDER — NICOTINE POLACRILEX 2 MG
14 GUM BUCCAL
Qty: 0 | Refills: 0 | DISCHARGE
Start: 2022-07-05

## 2022-07-05 RX ORDER — INSULIN GLARGINE 100 [IU]/ML
50 INJECTION, SOLUTION SUBCUTANEOUS
Qty: 0 | Refills: 0 | DISCHARGE
Start: 2022-07-05

## 2022-07-05 RX ORDER — NICOTINE POLACRILEX 2 MG
21 GUM BUCCAL
Qty: 0 | Refills: 0 | DISCHARGE
Start: 2022-07-05

## 2022-07-05 RX ORDER — FUROSEMIDE 40 MG
1 TABLET ORAL
Qty: 0 | Refills: 0 | DISCHARGE

## 2022-07-05 RX ADMIN — Medication 81 MILLIGRAM(S): at 11:34

## 2022-07-05 RX ADMIN — Medication 250 MILLIGRAM(S): at 09:53

## 2022-07-05 RX ADMIN — HEPARIN SODIUM 5000 UNIT(S): 5000 INJECTION INTRAVENOUS; SUBCUTANEOUS at 13:22

## 2022-07-05 RX ADMIN — CLOPIDOGREL BISULFATE 75 MILLIGRAM(S): 75 TABLET, FILM COATED ORAL at 11:35

## 2022-07-05 RX ADMIN — HEPARIN SODIUM 5000 UNIT(S): 5000 INJECTION INTRAVENOUS; SUBCUTANEOUS at 06:11

## 2022-07-05 RX ADMIN — PANTOPRAZOLE SODIUM 40 MILLIGRAM(S): 20 TABLET, DELAYED RELEASE ORAL at 06:10

## 2022-07-05 RX ADMIN — Medication 2: at 08:23

## 2022-07-05 RX ADMIN — Medication 100 MILLIGRAM(S): at 01:54

## 2022-07-05 RX ADMIN — CEFEPIME 100 MILLIGRAM(S): 1 INJECTION, POWDER, FOR SOLUTION INTRAMUSCULAR; INTRAVENOUS at 06:07

## 2022-07-05 RX ADMIN — Medication 100 MILLIGRAM(S): at 08:22

## 2022-07-05 RX ADMIN — Medication 1: at 12:41

## 2022-07-05 RX ADMIN — Medication 1 PATCH: at 12:42

## 2022-07-05 NOTE — DISCHARGE NOTE PROVIDER - NSDCFUSCHEDAPPT_GEN_ALL_CORE_FT
[facility] Physician [facility]  Cardio [address]  Scheduled Appointment: 07/27/2022

## 2022-07-05 NOTE — DISCHARGE NOTE NURSING/CASE MANAGEMENT/SOCIAL WORK - PATIENT PORTAL LINK FT
You can access the FollowMyHealth Patient Portal offered by [facility] by registering at the following website: [email or web address]. By joining [employer]’s FollowMyHealth portal, you will also be able to view your health information using other applications (apps) compatible with our system.

## 2022-07-05 NOTE — DISCHARGE NOTE PROVIDER - NSDCFUADDAPPT_GEN_ALL_CORE_FT
APPTS ARE READY TO BE MADE: [x ] YES    Best Family or Patient Contact (if needed):    Additional Information about above appointments (if needed):    1:   2:   3:     Other comments or requests: Please make appointment with Dr figueroa this week as early as possible   APPTS ARE READY TO BE MADE: [x ] YES    Best Family or Patient Contact (if needed):    Additional Information about above appointments (if needed):    1:   2:   3:     Other comments or requests: Please make appointment with Dr adan this week as early as possible    Patient was previously scheduled with Dr. Adan on Thursday 7/7/2022 8:45AM at [facility] [place] [address] APPTS ARE READY TO BE MADE: [x ] YES    Best Family or Patient Contact (if needed):    Additional Information about above appointments (if needed):    1:   2:   3:     Other comments or requests: Please make appointment with Dr adan this week as early as possible    Patient was previously scheduled with Dr. Adan on Thursday 7/7/2022 8:45AM at [address] [facility], [address] [place] [address]

## 2022-07-05 NOTE — CONSULT NOTE ADULT - PROBLEM SELECTOR RECOMMENDATION 9
-no fever  -urine and bcx negative  -CT with no obvious infection   -wbc normal  -nonlocalizing for pna, uti, cdiff, abd or biliary sepsis   -toe surgical incision clean on left foot   -dc abx and observe  -suspect this is probably related to hypoglycemic episodes

## 2022-07-05 NOTE — DISCHARGE NOTE NURSING/CASE MANAGEMENT/SOCIAL WORK - NSDCPEFALRISK_GEN_ALL_CORE
For information on Fall & Injury Prevention, visit: [email or web address] OR  [email or web address] OR  https://www.cdc.gov/steadi/patient.html

## 2022-07-05 NOTE — PROGRESS NOTE ADULT - SUBJECTIVE AND OBJECTIVE BOX
PROGRESS NOTE:    Funmilayo Bernardo MD  Internal Medicine PGY-1      Patient is a 61y old  Male who presents with a chief complaint of FUO (2022 04:30)      SUBJECTIVE / OVERNIGHT EVENTS: No acute overnight events. Pt seen and examined. Denies fevers, chills, CP, SOB, Abdominal pain, N/V, Constipation, Diarrhea      MEDICATIONS  (STANDING):  aspirin  chewable 81 milliGRAM(s) Oral daily  atorvastatin 80 milliGRAM(s) Oral at bedtime  cefepime   IVPB 2000 milliGRAM(s) IV Intermittent every 12 hours  cefepime   IVPB      clopidogrel Tablet 75 milliGRAM(s) Oral daily  dextrose 5%. 1000 milliLiter(s) (100 mL/Hr) IV Continuous <Continuous>  dextrose 50% Injectable 25 Gram(s) IV Push once  glucagon  Injectable 1 milliGRAM(s) IntraMuscular once  heparin   Injectable 5000 Unit(s) SubCutaneous every 8 hours  insulin glargine Injectable (LANTUS) 40 Unit(s) SubCutaneous at bedtime  insulin lispro (ADMELOG) corrective regimen sliding scale   SubCutaneous three times a day before meals  lactated ringers. 1000 milliLiter(s) (100 mL/Hr) IV Continuous <Continuous>  metroNIDAZOLE  IVPB      metroNIDAZOLE  IVPB 500 milliGRAM(s) IV Intermittent every 8 hours  pantoprazole    Tablet 40 milliGRAM(s) Oral before breakfast  tamsulosin 0.4 milliGRAM(s) Oral at bedtime  vancomycin  IVPB 1750 milliGRAM(s) IV Intermittent every 12 hours  vancomycin  IVPB        MEDICATIONS  (PRN):  acetaminophen     Tablet .. 650 milliGRAM(s) Oral every 6 hours PRN Temp greater or equal to 38C (100.4F), Mild Pain (1 - 3)  aluminum hydroxide/magnesium hydroxide/simethicone Suspension 30 milliLiter(s) Oral every 4 hours PRN Dyspepsia  dextrose Oral Gel 15 Gram(s) Oral once PRN Blood Glucose LESS THAN 70 milliGRAM(s)/deciliter  melatonin 3 milliGRAM(s) Oral at bedtime PRN Insomnia  ondansetron Injectable 4 milliGRAM(s) IV Push every 8 hours PRN Nausea and/or Vomiting      I&O's Summary      Vital Signs Last 24 Hrs  T(C): 36.6 (2022 03:36), Max: 37.9 (2022 19:30)  T(F): 97.9 (2022 03:36), Max: 100.3 (2022 19:30)  HR: 97 (2022 03:36) (91 - 101)  BP: 99/68 (2022 03:36) (77/53 - 100/68)  BP(mean): 78 (2022 02:57) (67 - 78)  RR: 18 (2022 03:36) (17 - 20)  SpO2: 97% (2022 03:36) (95% - 97%)    =================PHYSICAL EXAM=================    PHYSICAL EXAM:  GENERAL: NAD, lying in bed comfortably  HEAD:  Atraumatic, Normocephalic  EYES: EOMI, PERRLA, conjunctiva and sclera clear  ENT: Moist mucous membranes  NECK: Supple, No JVD  CHEST/LUNG: Clear to auscultation bilaterally; No rales, rhonchi, wheezing, or rubs. Unlabored respirations  HEART: Regular rate and rhythm; No murmurs, rubs, or gallops  ABDOMEN: Bowel sounds present; Soft, Nontender, Nondistended. No hepatomegally  EXTREMITIES:  2+ Peripheral Pulses, brisk capillary refill. No clubbing, cyanosis, or edema  NERVOUS SYSTEM:  Alert & Oriented X3, speech clear. No deficits   MSK: FROM all 4 extremities, full and equal strength  SKIN: No rashes or lesions    =================================================    LABS:                        12.9   6.83  )-----------( 156      ( 2022 20:08 )             41.8     Auto Eosinophil # 0.00  / Auto Eosinophil % 0.0   / Auto Neutrophil # 5.63  / Auto Neutrophil % 82.5  / BANDS % x        07-01    135  |  96  |  65<H>  ----------------------------<  123<H>  3.3<L>   |  22  |  2.51<H>    Ca    9.2      2022 20:08  TPro  7.8  /  Alb  4.1  /  TBili  0.7  /  DBili  x   /  AST  29  /  ALT  31  /  AlkPhos  99  07-01    PT/INR - ( 2022 20:08 )   PT: 13.9 sec;   INR: 1.20 ratio         PTT - ( 2022 20:08 )  PTT:31.4 sec      Urinalysis Basic - ( 2022 21:00 )    Color: Yellow / Appearance: Clear / S.017 / pH: x  Gluc: x / Ketone: Negative  / Bili: Negative / Urobili: Negative   Blood: x / Protein: 30 mg/dL / Nitrite: Negative   Leuk Esterase: Negative / RBC: 3 /hpf / WBC 2 /HPF   Sq Epi: x / Non Sq Epi: 1 /hpf / Bacteria: Negative            RADIOLOGY & ADDITIONAL TESTS:    Imaging Personally Reviewed:    Consultant(s) Notes Reviewed:      Care Discussed with Consultants/Other Providers:  
Patient is a 61y old  Male who presents with a chief complaint of FUO (05 Jul 2022 10:23)      SUBJECTIVE / OVERNIGHT EVENTS: no events     MEDICATIONS  (STANDING):  aspirin  chewable 81 milliGRAM(s) Oral daily  atorvastatin 80 milliGRAM(s) Oral at bedtime  clopidogrel Tablet 75 milliGRAM(s) Oral daily  dextrose 5%. 1000 milliLiter(s) (100 mL/Hr) IV Continuous <Continuous>  dextrose 50% Injectable 25 Gram(s) IV Push once  glucagon  Injectable 1 milliGRAM(s) IntraMuscular once  heparin   Injectable 5000 Unit(s) SubCutaneous every 8 hours  insulin glargine Injectable (LANTUS) 40 Unit(s) SubCutaneous at bedtime  insulin lispro (ADMELOG) corrective regimen sliding scale   SubCutaneous three times a day before meals  lactated ringers. 1000 milliLiter(s) (100 mL/Hr) IV Continuous <Continuous>  nicotine -  14 mG/24Hr(s) Patch 1 Patch Transdermal daily  pantoprazole    Tablet 40 milliGRAM(s) Oral before breakfast  tamsulosin 0.4 milliGRAM(s) Oral at bedtime    MEDICATIONS  (PRN):  acetaminophen     Tablet .. 650 milliGRAM(s) Oral every 6 hours PRN Temp greater or equal to 38C (100.4F), Mild Pain (1 - 3)  aluminum hydroxide/magnesium hydroxide/simethicone Suspension 30 milliLiter(s) Oral every 4 hours PRN Dyspepsia  dextrose Oral Gel 15 Gram(s) Oral once PRN Blood Glucose LESS THAN 70 milliGRAM(s)/deciliter  melatonin 3 milliGRAM(s) Oral at bedtime PRN Insomnia  ondansetron Injectable 4 milliGRAM(s) IV Push every 8 hours PRN Nausea and/or Vomiting      CAPILLARY BLOOD GLUCOSE      POCT Blood Glucose.: 194 mg/dL (05 Jul 2022 12:26)  POCT Blood Glucose.: 202 mg/dL (05 Jul 2022 07:59)  POCT Blood Glucose.: 248 mg/dL (04 Jul 2022 21:38)  POCT Blood Glucose.: 193 mg/dL (04 Jul 2022 16:49)    I&O's Summary    04 Jul 2022 07:01  -  05 Jul 2022 07:00  --------------------------------------------------------  IN: 1370 mL / OUT: 1300 mL / NET: 70 mL    05 Jul 2022 07:01  -  05 Jul 2022 15:19  --------------------------------------------------------  IN: 600 mL / OUT: 0 mL / NET: 600 mL      T(C): 36.9 (07-05-22 @ 09:24), Max: 36.9 (07-05-22 @ 09:24)  HR: 100 (07-05-22 @ 09:24) (100 - 100)  BP: 118/74 (07-05-22 @ 09:24) (118/74 - 118/74)  RR: 18 (07-05-22 @ 09:24) (18 - 18)  SpO2: 96% (07-05-22 @ 09:24) (96% - 96%)    PHYSICAL EXAM:  GENERAL: NAD, well-developed  HEAD:  Atraumatic, Normocephalic  EYES: EOMI, PERRLA, conjunctiva and sclera clear  NECK: Supple, No JVD  CHEST/LUNG: Clear to auscultation bilaterally; No wheeze  HEART: Regular rate and rhythm; No murmurs, rubs, or gallops  ABDOMEN: Soft, Nontender, Nondistended; Bowel sounds present  EXTREMITIES:  2+ Peripheral Pulses, No clubbing, cyanosis, or edema  PSYCH: AAOx3  NEUROLOGY: non-focal  SKIN: No rashes or lesions                          11.0   6.37  )-----------( 125      ( 05 Jul 2022 07:17 )             36.0               136|100|34<193  3.8|22|1.41  9.1,--,--  07-05 @ 07:17                RADIOLOGY & ADDITIONAL TESTS:    Imaging Personally Reviewed:    Consultant(s) Notes Reviewed:      Care Discussed with Consultants/Other Providers:  
  Patient is a 61y old  Male who presents with a chief complaint of FUO (2022 04:30)      SUBJECTIVE / OVERNIGHT EVENTS: No acute overnight events        MEDICATIONS  (STANDING):  aspirin  chewable 81 milliGRAM(s) Oral daily  atorvastatin 80 milliGRAM(s) Oral at bedtime  cefepime   IVPB 2000 milliGRAM(s) IV Intermittent every 12 hours  cefepime   IVPB      clopidogrel Tablet 75 milliGRAM(s) Oral daily  dextrose 5%. 1000 milliLiter(s) (100 mL/Hr) IV Continuous <Continuous>  dextrose 50% Injectable 25 Gram(s) IV Push once  glucagon  Injectable 1 milliGRAM(s) IntraMuscular once  heparin   Injectable 5000 Unit(s) SubCutaneous every 8 hours  insulin glargine Injectable (LANTUS) 40 Unit(s) SubCutaneous at bedtime  insulin lispro (ADMELOG) corrective regimen sliding scale   SubCutaneous three times a day before meals  lactated ringers. 1000 milliLiter(s) (100 mL/Hr) IV Continuous <Continuous>  metroNIDAZOLE  IVPB      metroNIDAZOLE  IVPB 500 milliGRAM(s) IV Intermittent every 8 hours  pantoprazole    Tablet 40 milliGRAM(s) Oral before breakfast  tamsulosin 0.4 milliGRAM(s) Oral at bedtime  vancomycin  IVPB 1750 milliGRAM(s) IV Intermittent every 12 hours  vancomycin  IVPB        MEDICATIONS  (PRN):  acetaminophen     Tablet .. 650 milliGRAM(s) Oral every 6 hours PRN Temp greater or equal to 38C (100.4F), Mild Pain (1 - 3)  aluminum hydroxide/magnesium hydroxide/simethicone Suspension 30 milliLiter(s) Oral every 4 hours PRN Dyspepsia  dextrose Oral Gel 15 Gram(s) Oral once PRN Blood Glucose LESS THAN 70 milliGRAM(s)/deciliter  melatonin 3 milliGRAM(s) Oral at bedtime PRN Insomnia  ondansetron Injectable 4 milliGRAM(s) IV Push every 8 hours PRN Nausea and/or Vomiting      I&O's Summary    T(C): 37.2 (22 @ 17:06), Max: 37.2 (22 @ 17:06)  HR: 105 (22 @ 17:06) (105 - 105)  BP: 108/69 (22 @ 17:06) (108/69 - 108/69)  RR: 18 (22 @ 17:06) (18 - 18)  SpO2: 96% (22 @ 17:06) (96% - 96%)  =================PHYSICAL EXAM=================    PHYSICAL EXAM:  GENERAL: NAD, lying in bed comfortably  HEAD:  Atraumatic, Normocephalic  EYES: EOMI, PERRLA, conjunctiva and sclera clear  ENT: Moist mucous membranes  NECK: Supple, No JVD  CHEST/LUNG: Clear to auscultation bilaterally; No rales, rhonchi, wheezing, or rubs. Unlabored respirations  HEART: Regular rate and rhythm; No murmurs, rubs, or gallops  ABDOMEN: Bowel sounds present; Soft, Nontender, Nondistended. No hepatomegally  EXTREMITIES:  2+ Peripheral Pulses, brisk capillary refill. No clubbing, cyanosis, or edema  NERVOUS SYSTEM:  Alert & Oriented X3, speech clear. No deficits   MSK: FROM all 4 extremities, full and equal strength  SKIN: No rashes or lesions    =================================================    LABS:                        12.9   6.83  )-----------( 156      ( 2022 20:08 )             41.8     Auto Eosinophil # 0.00  / Auto Eosinophil % 0.0   / Auto Neutrophil # 5.63  / Auto Neutrophil % 82.5  / BANDS % x        07-    135  |  96  |  65<H>  ----------------------------<  123<H>  3.3<L>   |  22  |  2.51<H>    Ca    9.2      2022 20:08  TPro  7.8  /  Alb  4.1  /  TBili  0.7  /  DBili  x   /  AST  29  /  ALT  31  /  AlkPhos  99  07-01    PT/INR - ( 2022 20:08 )   PT: 13.9 sec;   INR: 1.20 ratio         PTT - ( 2022 20:08 )  PTT:31.4 sec      Urinalysis Basic - ( 2022 21:00 )    Color: Yellow / Appearance: Clear / S.017 / pH: x  Gluc: x / Ketone: Negative  / Bili: Negative / Urobili: Negative   Blood: x / Protein: 30 mg/dL / Nitrite: Negative   Leuk Esterase: Negative / RBC: 3 /hpf / WBC 2 /HPF   Sq Epi: x / Non Sq Epi: 1 /hpf / Bacteria: Negative            RADIOLOGY & ADDITIONAL TESTS:    Imaging Personally Reviewed:    Consultant(s) Notes Reviewed:      Care Discussed with Consultants/Other Providers:  
  Patient is a 61y old  Male who presents with a chief complaint of FUO (02 Jul 2022 04:30)      SUBJECTIVE / OVERNIGHT EVENTS: No acute overnight events    T(C): 36.2 (07-04-22 @ 17:56), Max: 36.2 (07-04-22 @ 17:56)  HR: 108 (07-04-22 @ 17:56) (108 - 108)  BP: 102/68 (07-04-22 @ 17:56) (102/68 - 102/68)  RR: 18 (07-04-22 @ 17:56) (18 - 18)  SpO2: 96% (07-04-22 @ 17:56) (96% - 96%)      MEDICATIONS  (STANDING):  aspirin  chewable 81 milliGRAM(s) Oral daily  atorvastatin 80 milliGRAM(s) Oral at bedtime  cefepime   IVPB 2000 milliGRAM(s) IV Intermittent every 12 hours  cefepime   IVPB      clopidogrel Tablet 75 milliGRAM(s) Oral daily  dextrose 5%. 1000 milliLiter(s) (100 mL/Hr) IV Continuous <Continuous>  dextrose 50% Injectable 25 Gram(s) IV Push once  glucagon  Injectable 1 milliGRAM(s) IntraMuscular once  heparin   Injectable 5000 Unit(s) SubCutaneous every 8 hours  insulin glargine Injectable (LANTUS) 40 Unit(s) SubCutaneous at bedtime  insulin lispro (ADMELOG) corrective regimen sliding scale   SubCutaneous three times a day before meals  lactated ringers. 1000 milliLiter(s) (100 mL/Hr) IV Continuous <Continuous>  metroNIDAZOLE  IVPB      metroNIDAZOLE  IVPB 500 milliGRAM(s) IV Intermittent every 8 hours  nicotine -  14 mG/24Hr(s) Patch 1 Patch Transdermal daily  pantoprazole    Tablet 40 milliGRAM(s) Oral before breakfast  tamsulosin 0.4 milliGRAM(s) Oral at bedtime  vancomycin  IVPB 1750 milliGRAM(s) IV Intermittent every 12 hours  vancomycin  IVPB        MEDICATIONS  (PRN):  acetaminophen     Tablet .. 650 milliGRAM(s) Oral every 6 hours PRN Temp greater or equal to 38C (100.4F), Mild Pain (1 - 3)  aluminum hydroxide/magnesium hydroxide/simethicone Suspension 30 milliLiter(s) Oral every 4 hours PRN Dyspepsia  dextrose Oral Gel 15 Gram(s) Oral once PRN Blood Glucose LESS THAN 70 milliGRAM(s)/deciliter  melatonin 3 milliGRAM(s) Oral at bedtime PRN Insomnia  ondansetron Injectable 4 milliGRAM(s) IV Push every 8 hours PRN Nausea and/or Vomiting          =================PHYSICAL EXAM=================    PHYSICAL EXAM:  GENERAL: NAD, lying in bed comfortably  HEAD:  Atraumatic, Normocephalic  EYES: EOMI, PERRLA, conjunctiva and sclera clear  ENT: Moist mucous membranes  NECK: Supple, No JVD  CHEST/LUNG: Clear to auscultation bilaterally; No rales, rhonchi, wheezing, or rubs. Unlabored respirations  HEART: Regular rate and rhythm; No murmurs, rubs, or gallops  ABDOMEN: Bowel sounds present; Soft, Nontender, Nondistended. No hepatomegally  EXTREMITIES:  2+ Peripheral Pulses, brisk capillary refill. No clubbing, cyanosis, or edema  NERVOUS SYSTEM:  Alert & Oriented X3, speech clear. No deficits   MSK: FROM all 4 extremities, full and equal strength  SKIN: No rashes or lesions    =================================================              134|98|37<172  3.4|22|1.50  8.9,2.2,--  07-04 @ 07:33      CAPILLARY BLOOD GLUCOSE      POCT Blood Glucose.: 248 mg/dL (04 Jul 2022 21:38)  POCT Blood Glucose.: 193 mg/dL (04 Jul 2022 16:49)  POCT Blood Glucose.: 262 mg/dL (04 Jul 2022 11:39)  POCT Blood Glucose.: 204 mg/dL (04 Jul 2022 07:50)        RADIOLOGY & ADDITIONAL TESTS:    Imaging Personally Reviewed:    Consultant(s) Notes Reviewed:      Care Discussed with Consultants/Other Providers:

## 2022-07-05 NOTE — PROGRESS NOTE ADULT - ASSESSMENT
Problem/Plan - 1:  ·  Problem: FUO (fever of unknown origin).   CXR clear and UA neg  -s/p vanc, zosyn in ED; will c/w Vanc, cefepime, flagyl empirically for now as procal elevated  -s/p NS 500cc boluses; will keep on maintenance fluids for a few hours  SIRS from Viral etiology   Blood cx no growth to date     Problem 2   RONI from dehydration Hypotension   Fluids resolving   Continue to hjole Entresto        Problem/Plan - 3  ·  Problem: Chronic systolic congestive heart failure.   ·  Plan: -s/p AICD  -resume metoprolol succ 50mg qd,   resume Lasix 40 mg-  lower to 40 mg       PROBLEM 3   Hypotension from dehydration Roni   Fluids        Problem/Plan - 3:  ·  Problem: CAD S/P percutaneous coronary angioplasty.   ·  Plan: -c/w ASA , plavix and statin.     Problem/Plan - 4:  ·  Problem: T2DM (type 2 diabetes mellitus).   ·  Plan: -on lantus 60mg qhs at home  -will c/w lantus 40u qhs; and can uptitrate to goal FS:140-180  -FS AC HS  -AISS.

## 2022-07-05 NOTE — DISCHARGE NOTE NURSING/CASE MANAGEMENT/SOCIAL WORK - NSDCVIVACCINE_GEN_ALL_CORE_FT
influenza, injectable, quadrivalent, preservative free; 08-Feb-2018 11:10; Chely Patterson (RN); [employer]; [id number]; IntraMuscular; Deltoid Left.; 0.5 milliLiter(s); VIS (VIS Published: 07-Aug-2015, VIS Presented: 08-Feb-2018);   influenza, injectable, quadrivalent, preservative free; 21-Sep-2020 18:51; Yan Cazares (RN); [employer]; [id number] (Exp. Date: 30-Jun-2021); IntraMuscular; Deltoid Right.; 0.5 milliLiter(s); VIS (VIS Published: 15-Aug-2019, VIS Presented: 21-Sep-2020);

## 2022-07-05 NOTE — DISCHARGE NOTE PROVIDER - NSDCMRMEDTOKEN_GEN_ALL_CORE_FT
albuterol 90 mcg/inh inhalation aerosol: 2 puff(s) inhaled 2 times a day  Allegra 180 mg oral tablet: 1 tab(s) orally once a day  aspirin 81 mg oral tablet, chewable: 1 tab(s) orally once a day  clopidogrel 75 mg oral tablet: 1 tab(s) orally once a day  furosemide 80 mg oral tablet: 1 tab(s) orally 2 times a day  indapamide 1.25 mg oral tablet: 1 tab(s) orally once a day (in the morning)  Lantus 100 units/mL subcutaneous solution: 60 unit(s) subcutaneous once a day (at bedtime)  metoprolol succinate 50 mg oral tablet, extended release: 1 tab(s) orally once a day  pantoprazole 40 mg oral delayed release tablet: 1 tab(s) orally once a day  rosuvastatin 20 mg oral tablet: 1 tab(s) orally once a day  tamsulosin 0.4 mg oral capsule: 1 cap(s) orally once a day   albuterol 90 mcg/inh inhalation aerosol: 2 puff(s) inhaled 2 times a day  Allegra 180 mg oral tablet: 1 tab(s) orally once a day  aspirin 81 mg oral tablet, chewable: 1 tab(s) orally once a day  clopidogrel 75 mg oral tablet: 1 tab(s) orally once a day  furosemide 40 mg oral tablet: 1 tab(s) orally once a day  insulin glargine 100 units/mL subcutaneous solution: 40 unit(s) subcutaneous once a day (at bedtime)  metoprolol succinate 50 mg oral tablet, extended release: 1 tab(s) orally once a day  nicotine 14 mg/24 hr transdermal film, extended release: 14 milligram(s) transdermal once a day  pantoprazole 40 mg oral delayed release tablet: 1 tab(s) orally once a day  rosuvastatin 20 mg oral tablet: 1 tab(s) orally once a day  tamsulosin 0.4 mg oral capsule: 1 cap(s) orally once a day

## 2022-07-05 NOTE — DISCHARGE NOTE PROVIDER - PROVIDER TOKENS
FREE:[LAST:[henry],FIRST:[gayla],PHONE:[(   )    -],FAX:[(   )    -],ADDRESS:[[facility]--[phone]]]

## 2022-07-05 NOTE — DISCHARGE NOTE PROVIDER - HOSPITAL COURSE
·  Problem: FUO (fever of unknown origin).   CXR clear and UA neg  -s/p vanc, zosyn in ED; will c/w Vanc, cefepime, flagyl empirically for now as procal elevated  -s/p NS 500cc boluses; will keep on maintenance fluids for a few hours  SIRS from Viral etiology   Blood cx no growth to date     Problem 2   RONI from dehydration Hypotension   Fluids received, resolving   Continue to hold Entresto        Problem/Plan - 3  ·  Problem: Chronic systolic congestive heart failure.   ·  Plan: -s/p AICD  -resume metoprolol succ 50mg qd,   resume Lasix 40 mg-  lower to 40 mg       PROBLEM 3   Hypotension from dehydration Roni   Fluids        Problem/Plan - 3:  ·  Problem: CAD S/P percutaneous coronary angioplasty.   ·  Plan: -c/w ASA , plavix and statin.     Problem/Plan - 4:  ·  Problem: T2DM (type 2 diabetes mellitus).   ·  Plan: -on lantus 60mg qhs at home  -will c/w lantus 40u qhs; and can uptitrate to goal FS:140-180  -FS AC HS  -AISS.       · 61M PMH HTN, CAD/MI s/p stents, HFrEF s/p AICD, T2DM, h/o Osteomyelitis s/p left toe amputation presenting with low BP. Pt reports he had felt dizzy at home while laying down and his wife took his BP and found it to be low. He cannot recall the number. Reports having chills for the past few days but no fevers. He has also had a cough mildly productive of whitish sputum for the past 1-month he attributes to allergies. Denied neck stiffness, photophobia, SOB, CP, abdominal pain, diarrhea, dysuria, open wounds.      # Problem: FUO (fever of unknown origin).   CXR clear and UA neg  -s/p vanc, zosyn in ED; will c/w Vanc, cefepime, flagyl empirically for now as procal elevated  -s/p NS 500cc boluses; will keep on maintenance fluids for a few hours  SIRS from Viral etiology   Blood cx no growth to date     Problem 2   RONI from dehydration Hypotension   Fluids received, resolving   Continue to hold Entresto        Problem/Plan - 3  ·  Problem: Chronic systolic congestive heart failure.   ·  Plan: -s/p AICD  -resume metoprolol succ 50mg qd,   resume Lasix 40 mg-  lower to 40 mg       PROBLEM 3   Hypotension from dehydration Roni   Fluids        Problem/Plan - 3:  ·  Problem: CAD S/P percutaneous coronary angioplasty.   ·  Plan: -c/w ASA , plavix and statin.     Problem/Plan - 4:  ·  Problem: T2DM (type 2 diabetes mellitus).   ·  Plan: -on lantus 60mg qhs at home  -will c/w lantus 40u qhs; and can uptitrate to goal FS:140-180  Discharge plan and medication reconciliation DW Dr Sow.

## 2022-07-05 NOTE — CONSULT NOTE ADULT - ASSESSMENT
61M PMH HTN, CAD/MI s/p stents, HFrEF s/p AICD, T2DM, h/o Osteomyelitis s/p left toe amputation presenting with low BP and low BS at home    Sam Orozco  Attending Physician   Division of Infectious Disease  Office #[phone]  Available on [employer] Teams also  After 5pm/weekend or no response, call #[phone]

## 2022-07-05 NOTE — DISCHARGE NOTE NURSING/CASE MANAGEMENT/SOCIAL WORK - NSDCFUADDAPPT_GEN_ALL_CORE_FT
APPTS ARE READY TO BE MADE: [x ] YES    Best Family or Patient Contact (if needed):    Additional Information about above appointments (if needed):    1:   2:   3:     Other comments or requests: Please make appointment with Dr figueroa this week as early as possible

## 2022-07-05 NOTE — CONSULT NOTE ADULT - SUBJECTIVE AND OBJECTIVE BOX
SEVERIANO MARTINEZ 61y Male  [id number]    Patient is a 61y old  Male who presents with a chief complaint of FUO (04 Jul 2022 18:42)      HPI:  61M PMH HTN, CAD/MI s/p stents, HFrEF s/p AICD, T2DM, h/o Osteomyelitis s/p left toe amputation presenting with low BP. Pt reports he had felt dizzy at home while laying down and his wife took his BP and found it to be low. He cannot recall the number. Reports having chills for the past few days but no fevers. He has also had a cough mildly productive of whitish sputum for the past 1-month he attributes to allergies. Denied neck stiffness, photophobia, SOB, CP, abdominal pain, diarrhea, dysuria, open wounds.    ED course: Febrile to 100.3. tachy to 101 SBP:. Given vanc, zosyn, 500cc NS bolus x2 (02 Jul 2022 04:30)      PAST MEDICAL & SURGICAL HISTORY:  Stented coronary artery      Diabetes      AICD (automatic cardioverter/defibrillator) present      Hypertension      Heart failure with reduced ejection fraction      History of ischemic cardiomyopathy      History of COPD      H/O gastroesophageal reflux (GERD)      H/O vasectomy  20 yrs ago (2000)          Allergies    No Known Allergies    Intolerances        ANTIMICROBIALS:  cefepime   IVPB 2000 every 12 hours  cefepime   IVPB    metroNIDAZOLE  IVPB    metroNIDAZOLE  IVPB 500 every 8 hours  vancomycin  IVPB 1750 every 12 hours  vancomycin  IVPB        MEDICATIONS  (STANDING):  aspirin  chewable 81 milliGRAM(s) Oral daily  atorvastatin 80 milliGRAM(s) Oral at bedtime  cefepime   IVPB 2000 milliGRAM(s) IV Intermittent every 12 hours  cefepime   IVPB      clopidogrel Tablet 75 milliGRAM(s) Oral daily  dextrose 5%. 1000 milliLiter(s) (100 mL/Hr) IV Continuous <Continuous>  dextrose 50% Injectable 25 Gram(s) IV Push once  glucagon  Injectable 1 milliGRAM(s) IntraMuscular once  heparin   Injectable 5000 Unit(s) SubCutaneous every 8 hours  insulin glargine Injectable (LANTUS) 40 Unit(s) SubCutaneous at bedtime  insulin lispro (ADMELOG) corrective regimen sliding scale   SubCutaneous three times a day before meals  lactated ringers. 1000 milliLiter(s) (100 mL/Hr) IV Continuous <Continuous>  metroNIDAZOLE  IVPB      metroNIDAZOLE  IVPB 500 milliGRAM(s) IV Intermittent every 8 hours  nicotine -  14 mG/24Hr(s) Patch 1 Patch Transdermal daily  pantoprazole    Tablet 40 milliGRAM(s) Oral before breakfast  tamsulosin 0.4 milliGRAM(s) Oral at bedtime  vancomycin  IVPB 1750 milliGRAM(s) IV Intermittent every 12 hours  vancomycin  IVPB          Social History  Smoking:  Etoh:  Drug use:      FAMILY HISTORY:  Family history of COPD (chronic obstructive pulmonary disease) (Sibling)    Family history of cardiac disorder  Paternal        Vital Signs Last 24 Hrs  T(C): 36.9 (05 Jul 2022 09:24), Max: 37 (05 Jul 2022 00:05)  T(F): 98.5 (05 Jul 2022 09:24), Max: 98.6 (05 Jul 2022 00:05)  HR: 100 (05 Jul 2022 09:24) (100 - 108)  BP: 118/74 (05 Jul 2022 09:24) (102/68 - 118/74)  BP(mean): --  RR: 18 (05 Jul 2022 09:24) (18 - 18)  SpO2: 96% (05 Jul 2022 09:24) (91% - 96%)    CBC Full  -  ( 05 Jul 2022 07:17 )  WBC Count : 6.37 K/uL  RBC Count : 5.12 M/uL  Hemoglobin : 11.0 g/dL  Hematocrit : 36.0 %  Platelet Count - Automated : 125 K/uL  Mean Cell Volume : 70.3 fl  Mean Cell Hemoglobin : 21.5 pg  Mean Cell Hemoglobin Concentration : 30.6 gm/dL  Auto Neutrophil # : x  Auto Lymphocyte # : x  Auto Monocyte # : x  Auto Eosinophil # : x  Auto Basophil # : x  Auto Neutrophil % : x  Auto Lymphocyte % : x  Auto Monocyte % : x  Auto Eosinophil % : x  Auto Basophil % : x    07-05    136  |  100  |  34<H>  ----------------------------<  193<H>  3.8   |  22  |  1.41<H>    Ca    9.1      05 Jul 2022 07:17  Mg     2.2     07-04            MICROBIOLOGY:  Clean Catch Clean Catch (Midstream)  07-01-22   <10,000 CFU/mL Normal Urogenital Dorothy  --  --      .Blood Blood-Peripheral  07-01-22   No growth to date.  --  --      .Blood Blood-[facility]  07-01-22   No growth to date.  --  --              v              RADIOLOGY   SEVERIANO MARTINEZ 61y Male  [id number]    Patient is a 61y old  Male who presents with a chief complaint of FUO (04 Jul 2022 18:42)      HPI:  61M PMH HTN, CAD/MI s/p stents, HFrEF s/p AICD, T2DM, h/o Osteomyelitis s/p left toe amputation presenting with low BP. Pt reports he had felt dizzy at home while laying down and his wife took his BP and found it to be low. He cannot recall the number. Reports having chills for the past few days but no fevers. He has also had a cough mildly productive of whitish sputum for the past 1-month he attributes to allergies. Denied neck stiffness, photophobia, SOB, CP, abdominal pain, diarrhea, dysuria, open wounds.    ED course: Febrile to 100.3. tachy to 101 SBP:. Given vanc, zosyn, 500cc NS bolus x2 (02 Jul 2022 04:30)    No fever. Feels fine.     PAST MEDICAL & SURGICAL HISTORY:  Stented coronary artery      Diabetes      AICD (automatic cardioverter/defibrillator) present      Hypertension      Heart failure with reduced ejection fraction      History of ischemic cardiomyopathy      History of COPD      H/O gastroesophageal reflux (GERD)      H/O vasectomy  20 yrs ago (2000)          Allergies    No Known Allergies    Intolerances        ANTIMICROBIALS:  cefepime   IVPB 2000 every 12 hours  cefepime   IVPB    metroNIDAZOLE  IVPB    metroNIDAZOLE  IVPB 500 every 8 hours  vancomycin  IVPB 1750 every 12 hours  vancomycin  IVPB        MEDICATIONS  (STANDING):  aspirin  chewable 81 milliGRAM(s) Oral daily  atorvastatin 80 milliGRAM(s) Oral at bedtime  cefepime   IVPB 2000 milliGRAM(s) IV Intermittent every 12 hours  cefepime   IVPB      clopidogrel Tablet 75 milliGRAM(s) Oral daily  dextrose 5%. 1000 milliLiter(s) (100 mL/Hr) IV Continuous <Continuous>  dextrose 50% Injectable 25 Gram(s) IV Push once  glucagon  Injectable 1 milliGRAM(s) IntraMuscular once  heparin   Injectable 5000 Unit(s) SubCutaneous every 8 hours  insulin glargine Injectable (LANTUS) 40 Unit(s) SubCutaneous at bedtime  insulin lispro (ADMELOG) corrective regimen sliding scale   SubCutaneous three times a day before meals  lactated ringers. 1000 milliLiter(s) (100 mL/Hr) IV Continuous <Continuous>  metroNIDAZOLE  IVPB      metroNIDAZOLE  IVPB 500 milliGRAM(s) IV Intermittent every 8 hours  nicotine -  14 mG/24Hr(s) Patch 1 Patch Transdermal daily  pantoprazole    Tablet 40 milliGRAM(s) Oral before breakfast  tamsulosin 0.4 milliGRAM(s) Oral at bedtime  vancomycin  IVPB 1750 milliGRAM(s) IV Intermittent every 12 hours  vancomycin  IVPB          Social History  Smoking: no  Etoh: no      FAMILY HISTORY:  Family history of COPD (chronic obstructive pulmonary disease) (Sibling)    Family history of cardiac disorder  Paternal        Vital Signs Last 24 Hrs  T(C): 36.9 (05 Jul 2022 09:24), Max: 37 (05 Jul 2022 00:05)  T(F): 98.5 (05 Jul 2022 09:24), Max: 98.6 (05 Jul 2022 00:05)  HR: 100 (05 Jul 2022 09:24) (100 - 108)  BP: 118/74 (05 Jul 2022 09:24) (102/68 - 118/74)  BP(mean): --  RR: 18 (05 Jul 2022 09:24) (18 - 18)  SpO2: 96% (05 Jul 2022 09:24) (91% - 96%)    CBC Full  -  ( 05 Jul 2022 07:17 )  WBC Count : 6.37 K/uL  RBC Count : 5.12 M/uL  Hemoglobin : 11.0 g/dL  Hematocrit : 36.0 %  Platelet Count - Automated : 125 K/uL  Mean Cell Volume : 70.3 fl  Mean Cell Hemoglobin : 21.5 pg  Mean Cell Hemoglobin Concentration : 30.6 gm/dL  Auto Neutrophil # : x  Auto Lymphocyte # : x  Auto Monocyte # : x  Auto Eosinophil # : x  Auto Basophil # : x  Auto Neutrophil % : x  Auto Lymphocyte % : x  Auto Monocyte % : x  Auto Eosinophil % : x  Auto Basophil % : x    07-05    136  |  100  |  34<H>  ----------------------------<  193<H>  3.8   |  22  |  1.41<H>    Ca    9.1      05 Jul 2022 07:17  Mg     2.2     07-04            MICROBIOLOGY:  Clean Catch Clean Catch (Midstream)  07-01-22   <10,000 CFU/mL Normal Urogenital Dorothy  --  --      .Blood Blood-Peripheral  07-01-22   No growth to date.  --  --      .Blood Blood-Peripheral  07-01-22   No growth to date.  --  --        RADIOLOGY  < from: US Kidney and Bladder (07.02.22 @ 19:55) >  *  Increased echogenicity of the right kidney with overlying cortical   thinning.  *  Moderate to severe right hydronephrosis.  *  Right renal cyst.    < from: CT Abdomen and Pelvis No Cont (07.02.22 @ 14:06) >  Paraseptal emphysema with mild interlobular septal thickening likely   representative of mild pulmonary venous hypertension. No pneumonia seen.    Innumerable new punctate calcified granulomas and few subcentimeter   nodules diffusely distributed throughout the bilateral lungs,   significantly increased compared to prior study.    Hepatosplenomegaly. No acute abdominopelvic pathology.    < end of copied text >

## 2022-07-05 NOTE — DISCHARGE NOTE PROVIDER - NSDCCPCAREPLAN_GEN_ALL_CORE_FT
PRINCIPAL DISCHARGE DIAGNOSIS  Diagnosis: Fever of unknown origin (FUO)  Assessment and Plan of Treatment:   CXR clear and no indication for UTI  -Started vancomycin, flagyland  cefepime, empirically   Likely  Viral etiology   Blood cx no growth to date . DC antibiotic as recommended by ID        SECONDARY DISCHARGE DIAGNOSES  Diagnosis: Hypotension  Assessment and Plan of Treatment:   MANJIT from dehydration Hypotension (your creatinine was elevated)  Now improving with IV hydration  Continue to hold Entresto , follow up with your PCP      Diagnosis: MANJIT (acute kidney injury)  Assessment and Plan of Treatment: Likely from dehydration. Now improved. Follow up with your PCP in 1 week.  Problem 2   MANJIT from dehydration Hypotension   Fluids received, resolving   Continue to hold Entresto       Diagnosis: Chronic systolic congestive heart failure  Assessment and Plan of Treatment: Weigh yourself daily.  If you gain 3lbs in 3 days, or 5lbs in a week call your Health Care Provider.  Do not eat or drink foods containing more than 2000mg of salt (sodium) in your diet every day.  Call your Health Care Provider if you have any swelling or increased swelling in your feet, ankles, and/or stomach.  Take all of your medication as directed.  If you become dizzy call your Health Care Provider.  -resume metoprolol succ 50mg daily  resume Lasix 40 mg-  lower to 40 mg due to hypotension       PRINCIPAL DISCHARGE DIAGNOSIS  Diagnosis: Fever of unknown origin (FUO)  Assessment and Plan of Treatment:   CXR clear and no indication for UTI  -Started vancomycin, flagyland  cefepime, empirically   Likely  Viral etiology   Blood cx no growth to date . DC antibiotic as recommended by ID        SECONDARY DISCHARGE DIAGNOSES  Diagnosis: Hypotension  Assessment and Plan of Treatment:   MANJIT from dehydration Hypotension (your creatinine was elevated)  Now improving with IV hydration  Continue to hold Entresto , follow up with your PCP      Diagnosis: MANJIT (acute kidney injury)  Assessment and Plan of Treatment: Likely from dehydration. Now improved. Follow up with your PCP in 1 week.  Problem 2   MANJIT from dehydration Hypotension   Fluids received, resolving   Continue to hold Entresto       Diagnosis: Chronic systolic congestive heart failure  Assessment and Plan of Treatment: Weigh yourself daily.  If you gain 3lbs in 3 days, or 5lbs in a week call your Health Care Provider.  Do not eat or drink foods containing more than 2000mg of salt (sodium) in your diet every day.  Call your Health Care Provider if you have any swelling or increased swelling in your feet, ankles, and/or stomach.  Take all of your medication as directed.  If you become dizzy call your Health Care Provider.  -resume metoprolol succ 50mg daily  resume Lasix 40 mg-  lower to 40 mg due to hypotension      Diagnosis: DM (diabetes mellitus)  Assessment and Plan of Treatment:   Make sure you get your HgA1c checked every three months.  If you take oral diabetes medications, check your blood glucose two times a day.  If you take insulin, check your blood glucose before meals and at bedtime.  It's important not to skip any meals.  Keep a log of your blood glucose results and always take it with you to your doctor appointments.  Keep a list of your current medications including injectables and over the counter medications and bring this medication list with you to all your doctor appointments.  If you have not seen your ophthalmologist this year call for appointment.  Check your feet daily for redness, sores, or openings. Do not self treat. If no improvement in two days call your primary care physician for an appointment.  Low blood sugar (hypoglycemia) is a blood sugar below 70mg/dl. Check your blood sugar if you feel signs/symptoms of hypoglycemia. If your blood sugar is below 70 take 15 grams of carbohydrates (ex 4 oz of apple juice, 3-4 glucose tablets, or 4-6 oz of regular soda) wait 15 minutes and repeat blood sugar to make sure it comes up above 70.  If your blood sugar is above 70 and you are due for a meal, have a meal.  If you are not due for a meal have a snack.  This snack helps keeps your blood sugar at a safe range.  Your blood sugar is not that high now. please monitor blood sugar closely, and adjust insulin accordingly

## 2022-07-07 LAB
CULTURE RESULTS: SIGNIFICANT CHANGE UP
CULTURE RESULTS: SIGNIFICANT CHANGE UP
SPECIMEN SOURCE: SIGNIFICANT CHANGE UP
SPECIMEN SOURCE: SIGNIFICANT CHANGE UP

## 2022-08-06 NOTE — CONSULT NOTE ADULT - REASON FOR ADMISSION
Discharge instructions given. Advised to follow up with PCP  .  Patient verbalized understanding.  GCS 15. Patient was discharged in good and stable condition.     
SOB x 2 weeks

## 2022-08-25 NOTE — PROGRESS NOTE ADULT - SUBJECTIVE AND OBJECTIVE BOX
Patient is a 57y old  Male who presents with a chief complaint of Chest Pain, AWSTEMI (06 Feb 2018 16:23)    HPI:  This is a 57 year old man Active Smoker with past medical history of MI/CAD with Stents, DMT2 requiring Insulin, HFrEF (37%) with ICD ([employer]), HTN, HLD presents to [facility] ED from MD office with intermittent, non-radiating midsternal chest pain X 1 day in early stages 5/10 relieved with belching on presentation to MD office started radiating to right upper arm with intensity increasing to 10/10.  In ED, ECG showed VTE in V2-V3, received Aspirin, Plavix, Heparin Load and brought urgently to cath lab receiving NERI X 1 to Ramus via Right Radial Artery.  Patient states that several family members have the flu. (05 Feb 2018 18:22)      Overnight Event:    REVIEW OF SYSTEMS:  	    MEDICATIONS  (STANDING):  aspirin  chewable 81 milliGRAM(s) Oral daily  atorvastatin 80 milliGRAM(s) Oral at bedtime  dextrose 5%. 1000 milliLiter(s) (50 mL/Hr) IV Continuous <Continuous>  dextrose 50% Injectable 12.5 Gram(s) IV Push once  dextrose 50% Injectable 25 Gram(s) IV Push once  dextrose 50% Injectable 25 Gram(s) IV Push once  heparin  Injectable 5000 Unit(s) SubCutaneous every 8 hours  influenza   Vaccine 0.5 milliLiter(s) IntraMuscular once  insulin glargine Injectable (LANTUS) 45 Unit(s) SubCutaneous at bedtime  insulin lispro (HumaLOG) corrective regimen sliding scale   SubCutaneous three times a day before meals  insulin lispro (HumaLOG) corrective regimen sliding scale   SubCutaneous at bedtime  insulin lispro Injectable (HumaLOG) 16 Unit(s) SubCutaneous before dinner  insulin lispro Injectable (HumaLOG) 16 Unit(s) SubCutaneous before breakfast  insulin lispro Injectable (HumaLOG) 16 Unit(s) SubCutaneous before lunch  lisinopril 5 milliGRAM(s) Oral daily  metoprolol succinate ER 25 milliGRAM(s) Oral daily  nicotine -  14 mG/24Hr(s) Patch 1 patch Transdermal daily  spironolactone 25 milliGRAM(s) Oral daily  tamsulosin 0.4 milliGRAM(s) Oral at bedtime  ticagrelor 90 milliGRAM(s) Oral two times a day    MEDICATIONS  (PRN):  dextrose Gel 1 Dose(s) Oral once PRN Blood Glucose LESS THAN 70 milliGRAM(s)/deciLiter  glucagon  Injectable 1 milliGRAM(s) IntraMuscular once PRN Glucose <70 milliGRAM(s)/deciLiter        PHYSICAL EXAM:  Vital Signs Last 24 Hrs  T(C): 36.7 (08 Feb 2018 03:05), Max: 37.3 (07 Feb 2018 19:15)  T(F): 98.1 (08 Feb 2018 03:05), Max: 99.1 (07 Feb 2018 19:15)  HR: 80 (08 Feb 2018 06:00) (80 - 106)  BP: 90/67 (08 Feb 2018 06:00) (87/63 - 128/79)  BP(mean): 72 (08 Feb 2018 06:00) (70 - 99)  RR: 14 (08 Feb 2018 06:00) (13 - 18)  SpO2: 100% (08 Feb 2018 03:05) (98% - 100%)  I&O's Summary    07 Feb 2018 07:01  -  08 Feb 2018 07:00  --------------------------------------------------------  IN: 600 mL / OUT: 400 mL / NET: 200 mL        Appearance: Normal	  HEENT:   Normal oral mucosa, PERRL, EOMI	  Lymphatic: No lymphadenopathy  Cardiovascular: Normal S1 S2, No JVD, No murmurs, No edema  Respiratory: Lungs clear to auscultation	  Psychiatry: A & O x 3, Mood & affect appropriate  Gastrointestinal:  Soft, Non-tender, + BS	  Skin: No rashes, No ecchymoses, No cyanosis	  Neurologic: Non-focal  Extremities: Normal range of motion, No clubbing, cyanosis or edema  Vascular: Peripheral pulses palpable 2+ bilaterally    LABS:	 	                        15.0   8.3   )-----------( 168      ( 08 Feb 2018 05:29 )             42.3     Auto Eosinophil # 0.2   / Auto Eosinophil % 2.2   / Auto Neutrophil # 4.7   / Auto Neutrophil % 56.6  / BANDS % x                            15.1   9.8   )-----------( 160      ( 07 Feb 2018 03:30 )             41.7     Auto Eosinophil # x     / Auto Eosinophil % x     / Auto Neutrophil # x     / Auto Neutrophil % x     / BANDS % x        INR: 1.14 ratio (02-05 @ 16:10)    02-08    135  |  102  |  24<H>  ----------------------------<  177<H>  4.1   |  22  |  1.39<H>  02-07    135  |  100  |  23  ----------------------------<  213<H>  3.6   |  21<L>  |  1.29  02-06    135  |  97  |  17  ----------------------------<  356<H>  4.6   |  25  |  1.30    Ca    9.2      08 Feb 2018 05:29  Mg     2.3     02-08  Phos  3.7     02-08  TPro  7.0  /  Alb  3.7  /  TBili  0.4  /  DBili  x   /  AST  30  /  ALT  33  /  AlkPhos  73  02-08  TPro  7.0  /  Alb  3.6  /  TBili  0.3  /  DBili  x   /  AST  43<H>  /  ALT  34  /  AlkPhos  76  02-07  TPro  8.1  /  Alb  4.4  /  TBili  0.5  /  DBili  x   /  AST  90<H>  /  ALT  45  /  AlkPhos  93  02-06        proBNP: Serum Pro-Brain Natriuretic Peptide: 571 pg/mL (02-06 @ 03:16)  Serum Pro-Brain Natriuretic Peptide: 300 pg/mL (02-05 @ 16:10)    Lipid Profile: 02-06 Chol 188 LDL Unable to calculate LDL Cholesterol --- Interpretive Comment (for adults 18 and over)  Optimal LDL Level may vary based on clinical situation  Below 70                  Ideal for people at very high risk of heart  disease  Below 100                Ideal for people at risk of heart disease  100 - 129                   [place]  130 - 159                   Borderline high  160 - 189                   High  190 and Above          Very high HDL 28<L> Trig 630<H>  HgA1c: 11.6 % (02-06 @ 04:53)    TSH: Thyroid Stimulating Hormone, Serum: 1.92 uIU/mL (02-06 @ 04:53)      CARDIAC MARKERS:   06 Feb 2018 11:06 Troponin 1.85 ng/mL / Creatine Kinase 890 U/L /  CKMB 37.6 ng/mL / CPK Mass Assay % 4.2 %   06 Feb 2018 03:16 Troponin 2.82 ng/mL / Creatine Kinase 1128 U/L /  CKMB 61.7 ng/mL / CPK Mass Assay % 5.5 %   05 Feb 2018 16:10 Troponin <0.01 ng/mL / Creatine Kinase 323 U/L /  CKMB 8.5 ng/mL / CPK Mass Assay % 2.6 %        TELEMETRY: 	    ECG:  	  RADIOLOGY:  OTHER: 	    PREVIOUS DIAGNOSTIC TESTING:    [ ] Echocardiogram:  [ ]  Catheterization:    	  LAZARUS Stoner  Contact # Patient is a 57y old  Male who presents with a chief complaint of Chest Pain, AWSTEMI (06 Feb 2018 16:23)    HPI:  This is a 57 year old man Active Smoker with past medical history of MI/CAD with Stents, DMT2 requiring Insulin, HFrEF (37%) with ICD ([employer] Marc), HTN, HLD presents to [facility] ED from MD office with intermittent, non-radiating midsternal chest pain X 1 day in early stages 5/10 relieved with belching on presentation to MD office started radiating to right upper arm with intensity increasing to 10/10.  In ED, ECG showed VTE in V2-V3, received Aspirin, Plavix, Heparin Load and brought urgently to cath lab receiving NERI X 1 to Ramus via Right Radial Artery.  Patient states that several family members have the flu. (05 Feb 2018 18:22)      Overnight Event: No events overnight    REVIEW OF SYSTEMS: No complaints of SOB, CP, dizziness. Able to ambulate without difficulty.   	    MEDICATIONS  (STANDING):  aspirin  chewable 81 milliGRAM(s) Oral daily  atorvastatin 80 milliGRAM(s) Oral at bedtime  dextrose 5%. 1000 milliLiter(s) (50 mL/Hr) IV Continuous <Continuous>  dextrose 50% Injectable 12.5 Gram(s) IV Push once  dextrose 50% Injectable 25 Gram(s) IV Push once  dextrose 50% Injectable 25 Gram(s) IV Push once  heparin  Injectable 5000 Unit(s) SubCutaneous every 8 hours  influenza   Vaccine 0.5 milliLiter(s) IntraMuscular once  insulin glargine Injectable (LANTUS) 45 Unit(s) SubCutaneous at bedtime  insulin lispro (HumaLOG) corrective regimen sliding scale   SubCutaneous three times a day before meals  insulin lispro (HumaLOG) corrective regimen sliding scale   SubCutaneous at bedtime  insulin lispro Injectable (HumaLOG) 16 Unit(s) SubCutaneous before dinner  insulin lispro Injectable (HumaLOG) 16 Unit(s) SubCutaneous before breakfast  insulin lispro Injectable (HumaLOG) 16 Unit(s) SubCutaneous before lunch  lisinopril 5 milliGRAM(s) Oral daily  metoprolol succinate ER 25 milliGRAM(s) Oral daily  nicotine -  14 mG/24Hr(s) Patch 1 patch Transdermal daily  spironolactone 25 milliGRAM(s) Oral daily  tamsulosin 0.4 milliGRAM(s) Oral at bedtime  ticagrelor 90 milliGRAM(s) Oral two times a day    MEDICATIONS  (PRN):  dextrose Gel 1 Dose(s) Oral once PRN Blood Glucose LESS THAN 70 milliGRAM(s)/deciLiter  glucagon  Injectable 1 milliGRAM(s) IntraMuscular once PRN Glucose <70 milliGRAM(s)/deciLiter        PHYSICAL EXAM:  Vital Signs Last 24 Hrs  T(C): 36.7 (08 Feb 2018 03:05), Max: 37.3 (07 Feb 2018 19:15)  T(F): 98.1 (08 Feb 2018 03:05), Max: 99.1 (07 Feb 2018 19:15)  HR: 80 (08 Feb 2018 06:00) (80 - 106)  BP: 90/67 (08 Feb 2018 06:00) (87/63 - 128/79)  BP(mean): 72 (08 Feb 2018 06:00) (70 - 99)  RR: 14 (08 Feb 2018 06:00) (13 - 18)  SpO2: 100% (08 Feb 2018 03:05) (98% - 100%)  I&O's Summary    07 Feb 2018 07:01  -  08 Feb 2018 07:00  --------------------------------------------------------  IN: 600 mL / OUT: 400 mL / NET: 200 mL        Appearance: Normal	  HEENT:   Normal oral mucosa, PERRL, EOMI	  Lymphatic: No lymphadenopathy  Cardiovascular: Normal S1 S2, No JVD, No murmurs, No edema  Respiratory: Lungs clear to auscultation	  Psychiatry: A & O x 3, Mood & affect appropriate  Gastrointestinal:  Soft, Non-tender, + BS	  Skin: No rashes, No ecchymoses, No cyanosis	  Neurologic: Non-focal  Extremities: Normal range of motion, No clubbing, cyanosis or edema  Vascular: Peripheral pulses palpable 2+ bilaterally    LABS:	 	                        15.0   8.3   )-----------( 168      ( 08 Feb 2018 05:29 )             42.3     Auto Eosinophil # 0.2   / Auto Eosinophil % 2.2   / Auto Neutrophil # 4.7   / Auto Neutrophil % 56.6  / BANDS % x                            15.1   9.8   )-----------( 160      ( 07 Feb 2018 03:30 )             41.7     Auto Eosinophil # x     / Auto Eosinophil % x     / Auto Neutrophil # x     / Auto Neutrophil % x     / BANDS % x        INR: 1.14 ratio (02-05 @ 16:10)    02-08    135  |  102  |  24<H>  ----------------------------<  177<H>  4.1   |  22  |  1.39<H>  02-07    135  |  100  |  23  ----------------------------<  213<H>  3.6   |  21<L>  |  1.29  02-06    135  |  97  |  17  ----------------------------<  356<H>  4.6   |  25  |  1.30    Ca    9.2      08 Feb 2018 05:29  Mg     2.3     02-08  Phos  3.7     02-08  TPro  7.0  /  Alb  3.7  /  TBili  0.4  /  DBili  x   /  AST  30  /  ALT  33  /  AlkPhos  73  02-08  TPro  7.0  /  Alb  3.6  /  TBili  0.3  /  DBili  x   /  AST  43<H>  /  ALT  34  /  AlkPhos  76  02-07  TPro  8.1  /  Alb  4.4  /  TBili  0.5  /  DBili  x   /  AST  90<H>  /  ALT  45  /  AlkPhos  93  02-06        proBNP: Serum Pro-Brain Natriuretic Peptide: 571 pg/mL (02-06 @ 03:16)  Serum Pro-Brain Natriuretic Peptide: 300 pg/mL (02-05 @ 16:10)    Lipid Profile: 02-06 Chol 188 LDL Unable to calculate LDL Cholesterol --- Interpretive Comment (for adults 18 and over)  Optimal LDL Level may vary based on clinical situation  Below 70                  Ideal for people at very high risk of heart  disease  Below 100                Ideal for people at risk of heart disease  100 - 129                   Near [place]  130 - 159                   Borderline high  160 - 189                   High  190 and Above          Very high HDL 28<L> Trig 630<H>  HgA1c: 11.6 % (02-06 @ 04:53)    TSH: Thyroid Stimulating Hormone, Serum: 1.92 uIU/mL (02-06 @ 04:53)      CARDIAC MARKERS:   06 Feb 2018 11:06 Troponin 1.85 ng/mL / Creatine Kinase 890 U/L /  CKMB 37.6 ng/mL / CPK Mass Assay % 4.2 %   06 Feb 2018 03:16 Troponin 2.82 ng/mL / Creatine Kinase 1128 U/L /  CKMB 61.7 ng/mL / CPK Mass Assay % 5.5 %   05 Feb 2018 16:10 Troponin <0.01 ng/mL / Creatine Kinase 323 U/L /  CKMB 8.5 ng/mL / CPK Mass Assay % 2.6 %        TELEMETRY: No ectopy on monitor  ECG:  Continued resolution of JORJE V2-V4	    OTHER: Echo 2/8/18  Conclusions: EF 20-25%  1. Endocardial visualization enhanced with intravenous  injection of echo contrast (Definity).  Severe segmental  left ventricular systolic dysfunction. The anteroseptal,  inferoseptal, anterior walls and all apical segments are  akinetic.  No left ventricular thrombus. Swirling of  contrast suggests low flow.  2. Mild diastolic dysfunction (Stage I).  3. Trace pericardial effusion.  	  PREVIOUS DIAGNOSTIC TESTING:    [ ] Echocardiogram:  [ ]  Catheterization:Catheterization:< from: Cardiac Cath Lab - Adult (02.05.18 @ 16:05) >  EF estimated was 25 %.  CORONARY VESSELS: The coronary circulation is right dominant.  LM:   --  LM: Angiography showed minor luminal irregularities with no flow  limiting lesions.  LAD:   --  Ostial LAD: There was a 100 % stenosis.  CX:   --  Mid circumflex: There was a 30 % stenosis.  RI:   --  Ramus intermedius: There was a 100 % stenosis.  RCA:   --  RCA: Angiography showed minor luminal irregularities with no  flow limiting lesions.  	  KALLI Stoner-[facility]  Contact #[phone] with patient

## 2022-09-02 ENCOUNTER — APPOINTMENT (OUTPATIENT)
Dept: ELECTROPHYSIOLOGY | Facility: CLINIC | Age: 62
End: 2022-09-02

## 2022-09-17 NOTE — CONSULT NOTE ADULT - CONSULT REQUESTED DATE/TIME
Cardiology Progress Note      Patient:  Fe Grant  YOB: 1938  MRN: [id number]   Acct: [de-identified]  Admit Date:  9/14/2022  Primary Cardiologist:  Kailey Hare    Chief Complaint: s/p Clip    Subjective (Events in last 24 hours):   Doing very well this AM  No major drain output  BP stable  She wants to go home   TTE pending        Objective:   /67   Pulse 86   Temp 97.6 °F (36.4 °C) (Oral)   Resp 16   Ht 5' 4\" (1.626 m)   Wt 118 lb 2.7 oz (53.6 kg)   SpO2 100%   BMI 20.28 kg/m²        TELEMETRY: NSR    Physical Exam:  General Appearance: alert and oriented to person, place and time, in no acute distress  Cardiovascular: normal rate, regular rhythm, normal S1 and S2, no murmurs, rubs, clicks, or gallops, distal pulses intact, no carotid bruits, no JVD, pericardial drain with serosanguineous drainage  Pulmonary/Chest: clear to auscultation bilaterally- no wheezes, rales or rhonchi, normal air movement, no respiratory distress  Abdomen: soft, non-tender, non-distended, normal bowel sounds, no masses Extremities: no cyanosis, clubbing or edema, pulse   Skin: warm and dry, no rash or erythema  Head: normocephalic and atraumatic  Eyes: pupils equal, round, and reactive to light  Neck: supple and non-tender without mass, no thyromegaly   Musculoskeletal: normal range of motion, no joint swelling, deformity or tenderness  Neurological: alert, oriented, normal speech, no focal findings or movement disorder noted    Medications:    potassium chloride  20 mEq Oral Once    insulin lispro  0-16 Units SubCUTAneous TID     insulin lispro  0-4 Units SubCUTAneous Nightly    sodium chloride flush  5-40 mL IntraVENous 2 times per day    [Held by provider] heparin (porcine)  5,000 Units SubCUTAneous BID    atorvastatin  10 mg Oral Daily    levothyroxine  100 mcg Oral Daily    calcium replacement protocol   Other RX Placeholder      sodium chloride 75 mL/hr at 09/17/22 0814    sodium chloride      sodium 11-Sep-2020 14:51 chloride      sodium chloride      norepinephrine Stopped (09/15/22 5414)    midazolam 4 mg/hr (09/15/22 1619)    milrinone      [Held by provider] sodium bicarbonate infusion Stopped (09/15/22 4108)    sodium chloride      nitroGLYCERIN Stopped (09/15/22 1324)    dextrose       sodium chloride, , PRN  sodium chloride, , PRN  sodium chloride flush, 5-40 mL, PRN  sodium chloride, , PRN  ondansetron, 4 mg, Q8H PRN   Or  ondansetron, 4 mg, Q6H PRN  polyethylene glycol, 17 g, Daily PRN  acetaminophen, 650 mg, Q6H PRN   Or  acetaminophen, 650 mg, Q6H PRN  sodium chloride, , PRN  fentanNYL, 25 mcg, Q1H PRN  glucose, 4 tablet, PRN  dextrose bolus, 125 mL, PRN   Or  dextrose bolus, 250 mL, PRN  glucagon (rDNA), 1 mg, PRN  dextrose, , Continuous PRN    Lab Data:    Cardiac Enzymes:  No results for input(s): CKTOTAL, CKMB, CKMBINDEX, TROPONINI in the last 72 hours.     CBC:   Lab Results   Component Value Date/Time    WBC 12.5 09/17/2022 04:10 AM    RBC 3.19 09/17/2022 04:10 AM    HGB 9.5 09/17/2022 04:10 AM    HCT 29.5 09/17/2022 04:10 AM    PLT 88 09/17/2022 04:10 AM       CMP:    Lab Results   Component Value Date/Time     09/17/2022 04:10 AM    K 3.7 09/17/2022 04:10 AM     09/17/2022 04:10 AM    CO2 25 09/17/2022 04:10 AM    BUN 29 09/17/2022 04:10 AM    CREATININE 1.2 09/17/2022 04:10 AM    GFRAA 27 07/12/2022 09:39 AM    LABGLOM 43 09/17/2022 04:10 AM    GLUCOSE 138 09/17/2022 04:10 AM    CALCIUM 8.6 09/17/2022 04:10 AM       Hepatic Function Panel:    Lab Results   Component Value Date/Time    ALKPHOS 41 09/15/2022 06:00 AM    ALT 19 09/15/2022 06:00 AM    AST 38 09/15/2022 06:00 AM    PROT 5.2 09/15/2022 06:00 AM    BILITOT 1.0 09/15/2022 06:00 AM    LABALBU 3.8 09/15/2022 06:00 AM       Magnesium:    Lab Results   Component Value Date/Time    MG 2.0 09/17/2022 04:10 AM       PT/INR:    Lab Results   Component Value Date/Time    INR 1.09 09/14/2022 05:31 AM       HgBA1c:    Lab Results   Component Value Date/Time    LABA1C 5.8 09/14/2022 07:45 PM       FLP:    Lab Results   Component Value Date/Time    TRIG 89 08/16/2022 08:57 PM    HDL 31 08/16/2022 08:57 PM    LDLCALC 32 08/16/2022 08:57 PM       TSH:    Lab Results   Component Value Date/Time    TSH 0.59 05/25/2022 12:10 PM         Assessment:  S/p MitraClip XTW x 1 at A2-P2  Cardiac Tamponade 2/2 probable RA injury with clip advancement s/p pericardial drain  Cardiogenic Shock 2/2 acute increase in afterload with successful MitraClip with V wave from 80 to 25, EF dropped from 35 to 5%, SCAI C, s/p IABP  Secondary PH  Hx of LAD/Dx PCI, 8/17/22  CKD, stage 3b  NIDDM       Plan:  Recovered from all issues  TTE today  DAPT to restart  If no effusion, pull drain this afternoon  Ambulate  PT/OT  Restart GDMT as tolerated  Plan for d/c in the AM  Further recommendations based on results and clinical course.       Electronically signed by Lex Chaudhary MD on 9/17/2022 at 10:03 AM

## 2022-09-19 ENCOUNTER — APPOINTMENT (OUTPATIENT)
Dept: WOUND CARE | Facility: CLINIC | Age: 62
End: 2022-09-19

## 2022-09-19 ENCOUNTER — APPOINTMENT (OUTPATIENT)
Dept: ELECTROPHYSIOLOGY | Facility: CLINIC | Age: 62
End: 2022-09-19

## 2022-09-19 ENCOUNTER — NON-APPOINTMENT (OUTPATIENT)
Age: 62
End: 2022-09-19

## 2022-09-19 DIAGNOSIS — S81.801A UNSPECIFIED OPEN WOUND, RIGHT LOWER LEG, INITIAL ENCOUNTER: ICD-10-CM

## 2022-09-19 DIAGNOSIS — L97.511 NON-PRESSURE CHRONIC ULCER OF OTHER PART OF RIGHT FOOT LIMITED TO BREAKDOWN OF SKIN: ICD-10-CM

## 2022-09-19 PROCEDURE — 93295 DEV INTERROG REMOTE 1/2/MLT: CPT

## 2022-09-19 PROCEDURE — 93296 REM INTERROG EVL PM/IDS: CPT

## 2022-09-19 PROCEDURE — 99213 OFFICE O/P EST LOW 20 MIN: CPT

## 2022-09-19 NOTE — PHYSICAL EXAM
[de-identified] : Bilateral plantar forefoot wounds down to dermis w/ hyperkeratotic periwound with no acute signs of infection. LLE hyperkeratotic wound with no clinical signs of infection. Nails x10 are elongated, thickened, and discolored.

## 2022-09-19 NOTE — PLAN
[FreeTextEntry1] : 61M with L foot wound\par - Pt seen and evaluated.\par - Bilateral plantar forefoot wounds down to dermis w/ hyperkeratotic periwound with no acute signs of infection. LLE hyperkeratotic wound with no clinical signs of infection. Nails x10 are elongated, thickened, and discolored.\par - Pt cleared to transition into diabetic shoes with multidensity insoles (Rx previously given).\par - Sterile nail nipper and #15 blade used to excisionally debride bilateral plantar wounds down to the level of but not beyond dermis.\par - LLE hyperkeratosis mechanically removed with a sterile suture removal kit.\par - Rx for ABIs/PVRs.\par - Pt to get post op shoe for right foot.\par - Pt to get diabetic shoes with multi-density insoles with Demarcus.\par - Wound care with mupirocin and DSD daily.\par - RTC in 2 weeks.

## 2022-09-19 NOTE — HISTORY OF PRESENT ILLNESS
[FreeTextEntry1] : 61 YEAR OLD DIABETIC PATIENT presents to wound center for evaluation of infected left 3rd toe\par - patient was seen by Dr. Foster for an infected corn\par - patient went away on vacation and bone became infected\par - patient went to [facility] and was seen by Dr. Sigala for osteomyelitis\par - left 3rd ray resection s/p 3 weeks (1/21/22)\par - sutures intact with plantar ulcer left foot with no signs of cellulitis and no signs of acute infection\par - Surgical pathology: no OM\par - pt states that he has been compliant with weight bearing only using the CAM boot\par - pt is now ambulating using his custom orthotics in [facility] \par - Pt states that he will be going to [place] for the summer \par - Pt was in a [place] hospital for 3 weeks for cellulitis, septic shock, and COVID-19.\par - Pt denies N/V/C/F/SOB

## 2022-09-26 ENCOUNTER — APPOINTMENT (OUTPATIENT)
Dept: VASCULAR SURGERY | Facility: CLINIC | Age: 62
End: 2022-09-26

## 2022-09-26 PROCEDURE — 93970 EXTREMITY STUDY: CPT

## 2022-09-26 PROCEDURE — 93923 UPR/LXTR ART STDY 3+ LVLS: CPT

## 2022-09-28 ENCOUNTER — NON-APPOINTMENT (OUTPATIENT)
Age: 62
End: 2022-09-28

## 2022-09-30 ENCOUNTER — INPATIENT (INPATIENT)
Facility: HOSPITAL | Age: 62
LOS: 6 days | Discharge: HOME HEALTH | DRG: 291 | End: 2022-10-07
Attending: INTERNAL MEDICINE | Admitting: INTERNAL MEDICINE
Payer: MEDICARE

## 2022-09-30 VITALS
OXYGEN SATURATION: 98 % | RESPIRATION RATE: 20 BRPM | TEMPERATURE: 98 F | HEART RATE: 96 BPM | SYSTOLIC BLOOD PRESSURE: 103 MMHG | HEIGHT: 77 IN | DIASTOLIC BLOOD PRESSURE: 70 MMHG | WEIGHT: 263.89 LBS

## 2022-09-30 DIAGNOSIS — Z95.5 PRESENCE OF CORONARY ANGIOPLASTY IMPLANT AND GRAFT: ICD-10-CM

## 2022-09-30 DIAGNOSIS — I50.9 HEART FAILURE, UNSPECIFIED: ICD-10-CM

## 2022-09-30 DIAGNOSIS — E11.9 TYPE 2 DIABETES MELLITUS WITHOUT COMPLICATIONS: ICD-10-CM

## 2022-09-30 DIAGNOSIS — Z98.52 VASECTOMY STATUS: Chronic | ICD-10-CM

## 2022-09-30 DIAGNOSIS — N17.9 ACUTE KIDNEY FAILURE, UNSPECIFIED: ICD-10-CM

## 2022-09-30 DIAGNOSIS — Z29.9 ENCOUNTER FOR PROPHYLACTIC MEASURES, UNSPECIFIED: ICD-10-CM

## 2022-09-30 DIAGNOSIS — I50.23 ACUTE ON CHRONIC SYSTOLIC (CONGESTIVE) HEART FAILURE: ICD-10-CM

## 2022-09-30 DIAGNOSIS — I10 ESSENTIAL (PRIMARY) HYPERTENSION: ICD-10-CM

## 2022-09-30 LAB
ALBUMIN SERPL ELPH-MCNC: 3.5 G/DL — SIGNIFICANT CHANGE UP (ref 3.3–5)
ALP SERPL-CCNC: 118 U/L — SIGNIFICANT CHANGE UP (ref 40–120)
ALT FLD-CCNC: 17 U/L — SIGNIFICANT CHANGE UP (ref 10–45)
ANION GAP SERPL CALC-SCNC: 13 MMOL/L — SIGNIFICANT CHANGE UP (ref 5–17)
ANISOCYTOSIS BLD QL: SLIGHT — SIGNIFICANT CHANGE UP
APTT BLD: 31.6 SEC — SIGNIFICANT CHANGE UP (ref 27.5–35.5)
AST SERPL-CCNC: 20 U/L — SIGNIFICANT CHANGE UP (ref 10–40)
BASE EXCESS BLDV CALC-SCNC: -1.5 MMOL/L — SIGNIFICANT CHANGE UP (ref -2–3)
BASOPHILS # BLD AUTO: 0.05 K/UL — SIGNIFICANT CHANGE UP (ref 0–0.2)
BASOPHILS NFR BLD AUTO: 0.8 % — SIGNIFICANT CHANGE UP (ref 0–2)
BILIRUB SERPL-MCNC: 0.6 MG/DL — SIGNIFICANT CHANGE UP (ref 0.2–1.2)
BUN SERPL-MCNC: 42 MG/DL — HIGH (ref 7–23)
CA-I SERPL-SCNC: 1.18 MMOL/L — SIGNIFICANT CHANGE UP (ref 1.15–1.33)
CALCIUM SERPL-MCNC: 8.7 MG/DL — SIGNIFICANT CHANGE UP (ref 8.4–10.5)
CHLORIDE BLDV-SCNC: 105 MMOL/L — SIGNIFICANT CHANGE UP (ref 96–108)
CHLORIDE SERPL-SCNC: 106 MMOL/L — SIGNIFICANT CHANGE UP (ref 96–108)
CK MB BLD-MCNC: 2.8 % — SIGNIFICANT CHANGE UP (ref 0–3.5)
CK MB CFR SERPL CALC: 11.5 NG/ML — HIGH (ref 0–6.7)
CK SERPL-CCNC: 408 U/L — HIGH (ref 30–200)
CO2 BLDV-SCNC: 27 MMOL/L — HIGH (ref 22–26)
CO2 SERPL-SCNC: 22 MMOL/L — SIGNIFICANT CHANGE UP (ref 22–31)
CREAT SERPL-MCNC: 1.65 MG/DL — HIGH (ref 0.5–1.3)
DACRYOCYTES BLD QL SMEAR: SLIGHT — SIGNIFICANT CHANGE UP
EGFR: 48 ML/MIN/1.73M2 — LOW
EOSINOPHIL # BLD AUTO: 0.19 K/UL — SIGNIFICANT CHANGE UP (ref 0–0.5)
EOSINOPHIL NFR BLD AUTO: 3.4 % — SIGNIFICANT CHANGE UP (ref 0–6)
GAS PNL BLDV: 139 MMOL/L — SIGNIFICANT CHANGE UP (ref 136–145)
GAS PNL BLDV: SIGNIFICANT CHANGE UP
GAS PNL BLDV: SIGNIFICANT CHANGE UP
GLUCOSE BLDC GLUCOMTR-MCNC: 180 MG/DL — HIGH (ref 70–99)
GLUCOSE BLDV-MCNC: 144 MG/DL — HIGH (ref 70–99)
GLUCOSE SERPL-MCNC: 146 MG/DL — HIGH (ref 70–99)
HCO3 BLDV-SCNC: 25 MMOL/L — SIGNIFICANT CHANGE UP (ref 22–29)
HCT VFR BLD CALC: 34.9 % — LOW (ref 39–50)
HCT VFR BLDA CALC: 35 % — LOW (ref 39–51)
HGB BLD CALC-MCNC: 11.6 G/DL — LOW (ref 12.6–17.4)
HGB BLD-MCNC: 10 G/DL — LOW (ref 13–17)
INR BLD: 1.38 RATIO — HIGH (ref 0.88–1.16)
LACTATE BLDV-MCNC: 1.5 MMOL/L — SIGNIFICANT CHANGE UP (ref 0.5–2)
LYMPHOCYTES # BLD AUTO: 0.58 K/UL — LOW (ref 1–3.3)
LYMPHOCYTES # BLD AUTO: 10.1 % — LOW (ref 13–44)
MAGNESIUM SERPL-MCNC: 1.8 MG/DL — SIGNIFICANT CHANGE UP (ref 1.6–2.6)
MANUAL SMEAR VERIFICATION: SIGNIFICANT CHANGE UP
MCHC RBC-ENTMCNC: 23.2 PG — LOW (ref 27–34)
MCHC RBC-ENTMCNC: 28.7 GM/DL — LOW (ref 32–36)
MCV RBC AUTO: 81 FL — SIGNIFICANT CHANGE UP (ref 80–100)
MONOCYTES # BLD AUTO: 0.19 K/UL — SIGNIFICANT CHANGE UP (ref 0–0.9)
MONOCYTES NFR BLD AUTO: 3.4 % — SIGNIFICANT CHANGE UP (ref 2–14)
NEUTROPHILS # BLD AUTO: 4.69 K/UL — SIGNIFICANT CHANGE UP (ref 1.8–7.4)
NEUTROPHILS NFR BLD AUTO: 82.3 % — HIGH (ref 43–77)
NT-PROBNP SERPL-SCNC: 2777 PG/ML — HIGH (ref 0–300)
OVALOCYTES BLD QL SMEAR: SIGNIFICANT CHANGE UP
PCO2 BLDV: 50 MMHG — SIGNIFICANT CHANGE UP (ref 42–55)
PH BLDV: 7.31 — LOW (ref 7.32–7.43)
PLAT MORPH BLD: NORMAL — SIGNIFICANT CHANGE UP
PLATELET # BLD AUTO: 194 K/UL — SIGNIFICANT CHANGE UP (ref 150–400)
PO2 BLDV: 21 MMHG — LOW (ref 25–45)
POIKILOCYTOSIS BLD QL AUTO: SLIGHT — SIGNIFICANT CHANGE UP
POLYCHROMASIA BLD QL SMEAR: SLIGHT — SIGNIFICANT CHANGE UP
POTASSIUM BLDV-SCNC: 3.2 MMOL/L — LOW (ref 3.5–5.1)
POTASSIUM SERPL-MCNC: 3.1 MMOL/L — LOW (ref 3.5–5.3)
POTASSIUM SERPL-SCNC: 3.1 MMOL/L — LOW (ref 3.5–5.3)
PROT SERPL-MCNC: 7.1 G/DL — SIGNIFICANT CHANGE UP (ref 6–8.3)
PROTHROM AB SERPL-ACNC: 16.1 SEC — HIGH (ref 10.5–13.4)
RAPID RVP RESULT: SIGNIFICANT CHANGE UP
RBC # BLD: 4.31 M/UL — SIGNIFICANT CHANGE UP (ref 4.2–5.8)
RBC # FLD: 20.7 % — HIGH (ref 10.3–14.5)
RBC BLD AUTO: ABNORMAL
SAO2 % BLDV: 30.5 % — LOW (ref 67–88)
SARS-COV-2 RNA SPEC QL NAA+PROBE: SIGNIFICANT CHANGE UP
SODIUM SERPL-SCNC: 141 MMOL/L — SIGNIFICANT CHANGE UP (ref 135–145)
TROPONIN T, HIGH SENSITIVITY RESULT: 63 NG/L — HIGH (ref 0–51)
TROPONIN T, HIGH SENSITIVITY RESULT: 64 NG/L — HIGH (ref 0–51)
TSH SERPL-MCNC: 3.02 UIU/ML — SIGNIFICANT CHANGE UP (ref 0.27–4.2)
WBC # BLD: 5.7 K/UL — SIGNIFICANT CHANGE UP (ref 3.8–10.5)
WBC # FLD AUTO: 5.7 K/UL — SIGNIFICANT CHANGE UP (ref 3.8–10.5)

## 2022-09-30 PROCEDURE — 99285 EMERGENCY DEPT VISIT HI MDM: CPT | Mod: 25,GC

## 2022-09-30 PROCEDURE — 99223 1ST HOSP IP/OBS HIGH 75: CPT

## 2022-09-30 PROCEDURE — 71045 X-RAY EXAM CHEST 1 VIEW: CPT | Mod: 26

## 2022-09-30 PROCEDURE — 93010 ELECTROCARDIOGRAM REPORT: CPT | Mod: GC

## 2022-09-30 PROCEDURE — 99223 1ST HOSP IP/OBS HIGH 75: CPT | Mod: FS

## 2022-09-30 RX ORDER — INSULIN GLARGINE 100 [IU]/ML
40 INJECTION, SOLUTION SUBCUTANEOUS AT BEDTIME
Refills: 0 | Status: DISCONTINUED | OUTPATIENT
Start: 2022-09-30 | End: 2022-10-07

## 2022-09-30 RX ORDER — INSULIN LISPRO 100/ML
VIAL (ML) SUBCUTANEOUS
Refills: 0 | Status: DISCONTINUED | OUTPATIENT
Start: 2022-09-30 | End: 2022-10-07

## 2022-09-30 RX ORDER — FUROSEMIDE 40 MG
80 TABLET ORAL
Refills: 0 | Status: DISCONTINUED | OUTPATIENT
Start: 2022-10-01 | End: 2022-10-01

## 2022-09-30 RX ORDER — GLUCAGON INJECTION, SOLUTION 0.5 MG/.1ML
1 INJECTION, SOLUTION SUBCUTANEOUS ONCE
Refills: 0 | Status: DISCONTINUED | OUTPATIENT
Start: 2022-09-30 | End: 2022-10-07

## 2022-09-30 RX ORDER — FUROSEMIDE 40 MG
80 TABLET ORAL ONCE
Refills: 0 | Status: COMPLETED | OUTPATIENT
Start: 2022-09-30 | End: 2022-09-30

## 2022-09-30 RX ORDER — CLOPIDOGREL BISULFATE 75 MG/1
75 TABLET, FILM COATED ORAL DAILY
Refills: 0 | Status: DISCONTINUED | OUTPATIENT
Start: 2022-10-01 | End: 2022-10-07

## 2022-09-30 RX ORDER — DEXTROSE 50 % IN WATER 50 %
12.5 SYRINGE (ML) INTRAVENOUS ONCE
Refills: 0 | Status: DISCONTINUED | OUTPATIENT
Start: 2022-09-30 | End: 2022-10-07

## 2022-09-30 RX ORDER — ASPIRIN/CALCIUM CARB/MAGNESIUM 324 MG
81 TABLET ORAL DAILY
Refills: 0 | Status: DISCONTINUED | OUTPATIENT
Start: 2022-09-30 | End: 2022-10-07

## 2022-09-30 RX ORDER — HEPARIN SODIUM 5000 [USP'U]/ML
5000 INJECTION INTRAVENOUS; SUBCUTANEOUS EVERY 8 HOURS
Refills: 0 | Status: DISCONTINUED | OUTPATIENT
Start: 2022-09-30 | End: 2022-10-07

## 2022-09-30 RX ORDER — ASPIRIN/CALCIUM CARB/MAGNESIUM 324 MG
324 TABLET ORAL ONCE
Refills: 0 | Status: COMPLETED | OUTPATIENT
Start: 2022-09-30 | End: 2022-09-30

## 2022-09-30 RX ORDER — DEXTROSE 50 % IN WATER 50 %
25 SYRINGE (ML) INTRAVENOUS ONCE
Refills: 0 | Status: DISCONTINUED | OUTPATIENT
Start: 2022-09-30 | End: 2022-10-07

## 2022-09-30 RX ORDER — SODIUM CHLORIDE 9 MG/ML
1000 INJECTION, SOLUTION INTRAVENOUS
Refills: 0 | Status: DISCONTINUED | OUTPATIENT
Start: 2022-09-30 | End: 2022-10-07

## 2022-09-30 RX ORDER — PANTOPRAZOLE SODIUM 20 MG/1
40 TABLET, DELAYED RELEASE ORAL
Refills: 0 | Status: DISCONTINUED | OUTPATIENT
Start: 2022-09-30 | End: 2022-10-07

## 2022-09-30 RX ORDER — POTASSIUM CHLORIDE 20 MEQ
40 PACKET (EA) ORAL ONCE
Refills: 0 | Status: COMPLETED | OUTPATIENT
Start: 2022-09-30 | End: 2022-09-30

## 2022-09-30 RX ORDER — MAGNESIUM OXIDE 400 MG ORAL TABLET 241.3 MG
400 TABLET ORAL ONCE
Refills: 0 | Status: COMPLETED | OUTPATIENT
Start: 2022-09-30 | End: 2022-09-30

## 2022-09-30 RX ORDER — TAMSULOSIN HYDROCHLORIDE 0.4 MG/1
1 CAPSULE ORAL
Qty: 0 | Refills: 0 | DISCHARGE

## 2022-09-30 RX ORDER — DEXTROSE 50 % IN WATER 50 %
15 SYRINGE (ML) INTRAVENOUS ONCE
Refills: 0 | Status: DISCONTINUED | OUTPATIENT
Start: 2022-09-30 | End: 2022-10-07

## 2022-09-30 RX ORDER — METOPROLOL TARTRATE 50 MG
50 TABLET ORAL DAILY
Refills: 0 | Status: DISCONTINUED | OUTPATIENT
Start: 2022-09-30 | End: 2022-10-06

## 2022-09-30 RX ORDER — ACETAMINOPHEN 500 MG
975 TABLET ORAL ONCE
Refills: 0 | Status: COMPLETED | OUTPATIENT
Start: 2022-09-30 | End: 2022-09-30

## 2022-09-30 RX ORDER — ATORVASTATIN CALCIUM 80 MG/1
80 TABLET, FILM COATED ORAL AT BEDTIME
Refills: 0 | Status: DISCONTINUED | OUTPATIENT
Start: 2022-09-30 | End: 2022-10-07

## 2022-09-30 RX ADMIN — ATORVASTATIN CALCIUM 80 MILLIGRAM(S): 80 TABLET, FILM COATED ORAL at 22:47

## 2022-09-30 RX ADMIN — Medication 80 MILLIGRAM(S): at 13:57

## 2022-09-30 RX ADMIN — HEPARIN SODIUM 5000 UNIT(S): 5000 INJECTION INTRAVENOUS; SUBCUTANEOUS at 22:48

## 2022-09-30 RX ADMIN — Medication 975 MILLIGRAM(S): at 22:47

## 2022-09-30 RX ADMIN — INSULIN GLARGINE 40 UNIT(S): 100 INJECTION, SOLUTION SUBCUTANEOUS at 22:40

## 2022-09-30 RX ADMIN — MAGNESIUM OXIDE 400 MG ORAL TABLET 400 MILLIGRAM(S): 241.3 TABLET ORAL at 22:47

## 2022-09-30 RX ADMIN — Medication 324 MILLIGRAM(S): at 18:40

## 2022-09-30 RX ADMIN — Medication 40 MILLIEQUIVALENT(S): at 22:46

## 2022-09-30 RX ADMIN — Medication 975 MILLIGRAM(S): at 23:17

## 2022-09-30 NOTE — H&P ADULT - HISTORY OF PRESENT ILLNESS
62 y/o M presenting with LE swelling for 1 week    HPI: 62 y/o M with h/o HTN, CAD s/p stents, HFrEF s/p AICD, T2DM on insulin, osteo of L toe s/p amputation presenting with worsening LE edema for 1 week. Over the past week, patient has noticed worsening b/l LE swelling and tightness as well as decreased ET. Denies sick contacts/changes in diet/decrease in medications. Has been following up with outpatient cardiology who over this time period has been uptitrating outpatient lasix, last to po 80 and 40. Given pain and swelling continued presented to ED. of note had outpatient bilat dupplex neg for DVt. Denies cough/cp/palpitations/ab pain/dizziness/lightheadedness.     In ED afeb hds. Labs notable for CBC wnl, trop 60s *2, BUN/Cr 42/1.65 (1.41 on previous admission), proBNP 2777 (1K on previous admission). EKG Left axis deviation with signs of previous infarct (unchanged). CXR with mild R sided pleural effusion. Received 80 IV lasix *1.

## 2022-09-30 NOTE — H&P ADULT - NSHPLABSRESULTS_GEN_ALL_CORE
CXR personally reviewed: small R pleural effusion  EKG personally reviewed: L axis deviation, previous ischemic changes unchanged from prior

## 2022-09-30 NOTE — ED ADULT NURSE NOTE - ED STAT RN HANDOFF DETAILS
Report received from previous RN Nataly. Pt pending transport to [facility]. Report given by previous RN. Pt A&Ox4 at this time. NO acute distress noted at this time. Pt requesting food at this time.

## 2022-09-30 NOTE — ED PROVIDER NOTE - OBJECTIVE STATEMENT
61 M PMHx HTN, CAD/MI s/p stents, HFrEF s/p AICD, T2DM, h/o osteomyelitis s/p left toe amputation presenting for 1 wk of increasing leg swelling bilat and SOB, worse w/ exertion. Outpt cardiology sent in for further management. Taking 80 mg and 40 mg lasix daily, still w/ increased SOB and leg swelling. Bilat duplex LE negative for DVT yesterday per pt. Denies CP. Has chronic cough. Denies fevers or chills. Denies palpitations.

## 2022-09-30 NOTE — ED PROVIDER NOTE - ATTENDING CONTRIBUTION TO CARE
Attending (Cedric Mckinney D.O.):  I have personally seen and examined this patient. I have performed a substantive portion of the visit including all aspects of the medical decision making. Resident, fellow, and/or ACP note reviewed. I agree on the plan of care except where noted.    61M hx of HTN, CAD/MI s/p stents, HFrEF s/p AICD on lasix 80mg am 40mg qhs, type 2 DM, recent covid in [place] here for 2 weeks of progressive dyspnea on exertion, and 1 week of 12 lb weight gain, repors compliance w/ medication. Deneis cp, diaphoresis, vomiting, infectious sxs, bleeding anywhere. States legs and abdomen more swollen. Still making urine. Hemodynamically stable, no O2 req. +diffuse rales on lung exam, + pittign edema diffusely including presacral and abdominal wall. + RLE w/ erythema, improving per patient from prior cellulitis during hospitalization in [place]. No fluctaunce or crepitations. Patient also reports DVT study w/o acute findings in [place] when swelling was worse. + blowing cardiac murmur. Warm extremities. Suspect fluid overload state 2/2 worsening renal dysfxn or pure worsening heart failure. Eval for objective anemia vs metabolic derrangement. Check labs, cardiac biomarkers, EKG, CXR, place on cardiac monitor for telemetry monitoring. Given 80 mg lasix. Do not think RLE with worsening cellultiis, also does not appear that way per patient. Will monitor for now off abxs.

## 2022-09-30 NOTE — ED PROVIDER NOTE - PROGRESS NOTE DETAILS
Attending (Cedric Mckinney D.O.):  BNP 2000s, increased from prior, 1st trop 60s, in setting of ckd 3 with slight servando. Suspect trop 2/2 heart failure. Will dose aspirin and trend for now.

## 2022-09-30 NOTE — CONSULT NOTE ADULT - ASSESSMENT
Patient has an ischemic cardiomyopathy with very low LVEF, now woth wsorsening sob, edema and increased abdominal girth Presently hemodynamically. Troponin elevated secondary to renal insufficiency/CHF doubt scute ischemia/NSTEMI    1. ischemic cardiomyoathy  2. acute on chronic CHF right greater than left  3. s/p stent to LCX  4. previous LVEF 15-20%  5. hypokalemia  6. CRI    Recommend  advanced heart failure consult-discussed with Dr. Vanegas whose team will see him  repeat echo ordered  IV lasix drip may be best way to diurese  repalce postassium  careful wath on BUN creatinine etc  daily weights

## 2022-09-30 NOTE — H&P ADULT - ASSESSMENT
62 y/o M with h/o HTN, CAD s/p stents, HFrEF s/p AICD, T2DM on insulin, osteo of L toe s/p amputation presenting with worsening LE edema for 1 week c/w acute on chronic systolic HF exacerbation.

## 2022-09-30 NOTE — ED ADULT NURSE NOTE - OBJECTIVE STATEMENT
61 year old male PMH of COPD, CHF, recent hospital admission in [place] (6/2022) for RLE cellulitis presents to the ED through triage for bilateral leg swelling SOB/GREWAL. As per patient, had a RLE scan yesterday negative for DVT, but was told to come to ED for further management of leg swelling (is taking 80mg lasix a day) Patient denies CP, fever/chills, nausea, vomiting, diarrhea, dysuria, recent travel/sick contacts. 18g peripheral IV placed in L AC and labs drawn and sent caroline lab. Patient undressed and placed into gown, call bell in hand and side rails up with bed in lowest position for safety. blanket provided. Comfort and safety provided.  Has chronic cough. Denies fevers or chills. Denies palpitations.

## 2022-09-30 NOTE — ED ADULT NURSE NOTE - NSIMPLEMENTINTERV_GEN_ALL_ED
Implemented All Universal Safety Interventions:  [place] to call system. Call bell, personal items and telephone within reach. Instruct patient to call for assistance. Room bathroom lighting operational. Non-slip footwear when patient is off stretcher. Physically safe environment: no spills, clutter or unnecessary equipment. Stretcher in lowest position, wheels locked, appropriate side rails in place.

## 2022-09-30 NOTE — ED PROVIDER NOTE - PHYSICAL EXAMINATION
Gen: A&Ox4   HEENT: Atraumatic. Mucous membranes moist, no scleral icterus.  CV: RRR. 2+ bilat LE edema.   Resp: Respirations unlabored. CTAB, no rales, no wheezes.  GI: Abdomen non tender to palpation, soft and non-distended.   Skin/MSK: Small wound over base of right foot  w/o fluctuance or significant signs of infection. No ecchymosis appreciated.  Neuro: Following commands. EOMI. Pupils ERRL.  Psych: Appropriate mood, cooperative

## 2022-09-30 NOTE — H&P ADULT - CARDIOVASCULAR
details… 2+ pedal edema to thighs/normal/regular rate and rhythm/S1 S2 present/no gallops/no rub/no murmur/peripheral edema

## 2022-09-30 NOTE — CONSULT NOTE ADULT - ATTENDING COMMENTS
62 y/o male with h/o chronic systolic HF ACC/AHA stage C-D, ICMP LVEF 10-15% LVIDd 7cm, CAD s/p anterior wall STEMI ‘PCI to large RI ’18 PCI to LCx ‘19, single chamber ICD s/p extraction due to bacteremia followed by s/p S-ICD implant ‘20`, HTN, tobacco use (until few weeks ago), COPD, LUIS ALFREDO not on CPAP, DM 2 (a1c 7%), L toe osteomyelitis s/p amputation 1/2022, CKD 3 and h/o right mod-sev hydronephrosis who was referred to the ED by Dr. IVORY Camara due to acute decompensated HF. The patient reports he was recently hospitalized at [facility] while he was visiting [place]. He was hospitalized x 4 weeks in setting of covid-19 infection, cellulitis, septic shock, ADHF and MANJIT requiring temporary RRT. Both him and his wife reports most of his medictaions had to be discontinued due to low BP and he was temporarily on dialysis due to worsening kidney function. He was discharged a few weeks ago and he subsequently traveled back to [place]. Since he was discharged he has noted worsening symptoms including SOB, orthopnea, PND, Le edema and weight gain. His PCP has been uptitrating diuretics up to 80mg qam and 40mg qpm with no improvement. He met Dr. Camara yesterday and he was recommended to come to the ED.   He also refers he was called by his device monitoring team and was told that he had an “event”.   Interestingly, his sister is one of our current patients at [facility] and she is being evaluated for advanced therapies.     Upon arrival to the ED, he was hemodynamically stable and oxygenating well. His labs were remarkable for H/H 10/34.9, K 3.1, create 1.65 (eGFR 48), Trop T 63-64 and proBNP 2.7K. His CxR was cleared except for ?small r pleural effusion. Clinically looks hypervolemic with edema upto his thighs, ?ascites and JVD up to ear lobe. Extremities feel lukewarm.  Prior cardiac studies include:   LHC 2019 LM normal, LAD ostial 100%, LCx 80%, RI normal, RCA mid 50%. S/p NERI to LCx.   TTE 2019 LVEF 10-15% LVIDd 7cm, mild MR, RVSP 26mmHg (RAP 8mmHg)    His clinical presentation is compatible with acute decompensated HF associated to anasarca likely due to biventricular systolic HF. He has concerning features for advanced disease including recurrent hospitalizations, escalating doses of diuretics, inability to tolerate GDMT, end organ dysfunction and worsening function class..   Recommendations:   - Replete K for goal > 4. OK to resume diuretics once K repleted.   - Stop Lasix boluses and start bumex 4mg IV x 1 followed by bumex gtt @ 2 mg/h.  - Obtain BMP/Mag Q12h and replete electrolytes as needed  - Strict I/Os, daily standing weights  - GDMT: continue metoprolol succinate 50mg daily, hold for SBP <90 or HR < 50. HOLD entresto for now due to borderline BP. Plan to restart later and also add SGLT2i. May need inotropic support.   - Start Aldactone 25mg daily. Will uptitrate as able  - Check perfusion labs: lactate, LFTs, creatinine  - Obtain TTE  - Interrogate S-ICD  - Continue DAPT for CAD  HF education provided including natural progression, prognosis and treatment.   Discussed importance of tobacco cessation.   We will continue to follow with you.

## 2022-09-30 NOTE — CONSULT NOTE ADULT - SUBJECTIVE AND OBJECTIVE BOX
CHIEF COMPLAINT:  sob edema inceasing abdominal girth-asked to evaluate cardiac status  HISTORY OF PRESENT ILLNESS:    HPI:  60 y/o M presenting with LE swelling for 1 week    HPI: 60 y/o M with h/o HTN, CAD s/p stents, HFrEF s/p AICD, T2DM on insulin, osteo of L toe s/p amputation presenting with worsening LE edema for 1 week. Over the past week, patient has noticed worsening b/l LE swelling and tightness as well as decreased ET. Denies sick contacts/changes in diet/decrease in medications. Has been following up with outpatient cardiology who over this time period has been uptitrating outpatient lasix, last to po 80 and 40. Given pain and swelling continued presented to ED. of note had outpatient bilat dupplex neg for DVt. Denies cough/cp/palpitations/ab pain/dizziness/lightheadedness.     Patient has long hx of ischemic cardiomyopathy starting in  s/P MI  recent exascerbation of heart failure symptioms with fluid overload. Has had renal failure acute at one point requiring dialysis  Now with increased abdominal girth sob increasing edema    PAST MEDICAL & SURGICAL HISTORY:  Stented coronary artery LCX % (2019)      Diabetes      AICD (automatic cardioverter/defibrillator) present      Hypertension      Heart failure with reduced ejection fraction      History of ischemic cardiomyopathy      History of COPD      H/O gastroesophageal reflux (GERD)      H/O vasectomy  20 yrs ago ()              MEDICATIONS:  aspirin  chewable 81 milliGRAM(s) Oral daily  heparin   Injectable 5000 Unit(s) SubCutaneous every 8 hours  metoprolol succinate ER 50 milliGRAM(s) Oral daily          pantoprazole    Tablet 40 milliGRAM(s) Oral before breakfast    atorvastatin 80 milliGRAM(s) Oral at bedtime  dextrose 50% Injectable 25 Gram(s) IV Push once  dextrose 50% Injectable 12.5 Gram(s) IV Push once  dextrose 50% Injectable 25 Gram(s) IV Push once  dextrose Oral Gel 15 Gram(s) Oral once PRN  glucagon  Injectable 1 milliGRAM(s) IntraMuscular once  insulin glargine Injectable (LANTUS) 40 Unit(s) SubCutaneous at bedtime  insulin lispro (ADMELOG) corrective regimen sliding scale   SubCutaneous three times a day before meals    dextrose 5%. 1000 milliLiter(s) IV Continuous <Continuous>  dextrose 5%. 1000 milliLiter(s) IV Continuous <Continuous>  magnesium oxide 400 milliGRAM(s) Oral Once  potassium chloride    Tablet ER 40 milliEquivalent(s) Oral once      FAMILY HISTORY:  Family history of COPD (chronic obstructive pulmonary disease) (Sibling)    Family history of cardiac disorder  Paternal        SOCIAL HISTORY:    [ ] Non-smoker  [ ] Smoker  [ ] Alcohol    Allergies    No Known Allergies    Intolerances    	    REVIEW OF SYSTEMS:  CONSTITUTIONAL: No fever, weight loss, or fatigue  EYES: No eye pain, visual disturbances, or discharge  ENMT:  No difficulty hearing, tinnitus, vertigo; No sinus or throat pain  NECK: No pain or stiffness  RESPIRATORY: No cough, wheezing, chills or hemoptysis; No Shortness of Breath  CARDIOVASCULAR: No chest pain, palpitations, passing out, dizziness, or leg swelling  GASTROINTESTINAL: No abdominal or epigastric pain. No nausea, vomiting, or hematemesis; No diarrhea or constipation. No melena or hematochezia.  GENITOURINARY: No dysuria, frequency, hematuria, or incontinence  NEUROLOGICAL: No headaches, memory loss, loss of strength, numbness, or tremors  SKIN: No itching, burning, rashes, or lesions   LYMPH Nodes: No enlarged glands  ENDOCRINE: No heat or cold intolerance; No hair loss  MUSCULOSKELETAL: No joint pain or swelling; No muscle, back, or extremity pain  PSYCHIATRIC: No depression, anxiety, mood swings, or difficulty sleeping  HEME/LYMPH: No easy bruising, or bleeding gums  ALLERY AND IMMUNOLOGIC: No hives or eczema	    [ ] All others negative	  [ ] Unable to obtain    PHYSICAL EXAM:  T(C): 36.7 (22 @ 19:49), Max: 36.7 (22 @ 19:49)  HR: 103 (22 @ 19:49) (96 - 103)  BP: 113/78 (22 @ 19:49) (103/70 - 113/78)  RR: 20 (22 @ 19:49) (18 - 20)  SpO2: 99% (22 @ 19:49) (98% - 100%)  Wt(kg): --  I&O's Summary      Appearance: Normal	NAD  HEENT:   Normal oral mucosa, PERRL, EOMI	  Cardiovascular: Normal S1 S2, No JVD, No murmurs no s3  Respiratory: Lungs clear to auscultation	  Psychiatry: A & O x 3, Mood & affect appropriate  Gastrointestinal:  probuberant abdomen ?ascites  Skin: No rashes, No ecchymoses, No cyanosis	  Neurologic: Non-focal  Extremities:2-3+ pitting edemaVascular: Peripheral pulses palpable 2+ bilaterally    TELEMETRY: 	    ECG:  NSR no acute changes	  RADIOLOGY:  < from: Xray Chest 1 View- PORTABLE-Urgent (Xray Chest 1 View- PORTABLE-Urgent .) (22 @ 14:18) >    FINDINGS:    Blunting of the right costophrenic angle, may represent a small pleural   effusion. Lungs are otherwise clear.    Heart size cannot be accurately assessed on this projection. Left chest   wall SICD.    No acute osseous findings.      IMPRESSION:    Blunting of the right costophrenic angle, may represent a small pleural   effusion. Lungs are otherwise clear.    -OTHER: 	  	  LABS:	 	    CARDIAC MARKERS:                                  10.0   5.70  )-----------( 194      ( 30 Sep 2022 13:45 )             34.9         141  |  106  |  42<H>  ----------------------------<  146<H>  3.1<L>   |  22  |  1.65<H>    Ca    8.7      30 Sep 2022 13:45  Mg     1.8         TPro  7.1  /  Alb  3.5  /  TBili  0.6  /  DBili  x   /  AST  20  /  ALT  17  /  AlkPhos  118      proBNP: Serum Pro-Brain Natriuretic Peptide: 2777 pg/mL ( @ 13:45)    Lipid Profile:   HgA1c:   TSH: Thyroid Stimulating Hormone, Serum: 3.02 uIU/mL ( @ 13:45)      < from: TTE with Doppler (w/Cont) (20 @ 05:44) >  Conclusions:  1. Aortic valve not well visualized; appears calcified.  2. Left ventricular enlargement.  3. Endocardial visualization enhanced with intravenous  injection of Ultrasonic Enhancing Agent (Definity).  Severe  global left ventricular systolic dysfunction.No obvious LV  thrombus. Estimated EF of 15-20%. No left ventricular  thrombus.  4. Severe diastolic dysfunction (Stage III).  5. A device wire is noted in the right heart.  6. Tricuspid valve not well visualized. No tricuspid  regurgitation.  7. Pulmonic valve not well visualized.  Unable to rule out endocarditis. Consider DONNIE if clinically  indicated.  ------------------------------------------------------------------------  Confirmed on  9/10/2020 - 11:09:29 by Raza Kraft M.D.  ------------------------------------------------------------------------    c< from: Cardiac Cath Lab - Adult (19 @ 14:38) >  RADIATION EXPOSURE: 5.6 min. A successful drug-eluting stent was performed  on the 80 % lesion in the distal circumflex. Following intervention there  was a 1 % residual stenosis. According to the ACC/AHA classification  system, this lesion was a type C lesion. There was MICHELLE 3 flow before the  procedure and MICHELLE 3 flow after the procedure. Vessel setup was performed.  A 6FR JL3.5 LAUNCHER guiding catheter was used to intubate the vessel.  Vessel setup was performed. A LAURA [id number] wire was used to cross the  lesion. A 3.50 X 38 JAIDA drug-eluting stent was placed across the lesion  and deployed at a maximum inflation pressure of 16 maggi. A 3.50 X 26 JAIDA  drug-eluting stent was placed across the lesion and deployed at a maximum  inflation pressure of 14 maggi.  CONTRAST GIVEN: Omnipaque 35 ml. Omnipaque 55 ml.  MEDICATIONS GIVEN: Midazolam, 1 mg, IV. Fentanyl, 25 mcg, IV. Verapamil  (Isoptin, Calan, Covera), 2.5 mg, IA. Nitroglycerin, 100 mcg,  intracoronary. Heparin, 3000 units, IA. Heparin, 8000 units, IV.  CORONARY VESSELS: The coronary circulation is right dominant.  LM:  --  LM: Normal.  LAD:   --  Ostial LAD: There was a 100 % stenosis.  CX:   --  Distal circumflex: There was a 80 % stenosis.  RI:   --  Ramus intermedius: Normal. There was no significant restenosis.  RCA:   --  Mid RCA: There was a 50 % stenosis.  COMPLICATIONS: There were no complications.  INTERVENTIONAL RECOMMENDATIONS: DAPT for 1 year  Prepared and signed by  Franklin Blackmon M.D.  Signed 2019 10:15:06  HEMODYNAMIC TABLES  Pressures:  Baseline  Pressures:  - HR: 101  Pressures:  - Rhythm:  Pressures:  -- Aortic Pressure (S/D/M): 117/81/97  Outputs:  Baseline  Outputs:  -- CALCULATIONS[age]  Outputs:  -- CALCULATIONS: Body Surface Area: 2.53  Outputs:  -- CALCULATIONS: Height in cm: 196.00  Outputs:  -- CALCULATIONS: Sex: Male  Outputs:  -- CALCULATIONS: Weight in k.00

## 2022-09-30 NOTE — ED PROVIDER NOTE - NSCAREINITIATED _GEN_ER
[Negative] : Heme/Lymph [Patient Intake Form Reviewed] : Patient intake form was reviewed Jeison Lopes(Resident)

## 2022-09-30 NOTE — H&P ADULT - PROBLEM SELECTOR PLAN 5
On LA 50 units qhs + TID 10 units if  or greater  -LA 40 units while admitted given changes in diet and MANJIT on CKD  -ISS  -uptitrate as needed

## 2022-09-30 NOTE — H&P ADULT - PROBLEM SELECTOR PLAN 1
Worsening LE edema with mild R pleural effusion. Not hypoxic. Trigger unclear (possibly 2/2 to dietary indescretion)  -s/p lasix 80 IV *1  -Lasix 80 IV BID  -monitor I/O  -Tele  -monitor K/Mg replete to 4/2  -cards consulted appreciate recs  -low concern active ACS  -continue metoprolol, holding entresto iso possible MANJIT

## 2022-09-30 NOTE — CONSULT NOTE ADULT - ASSESSMENT
Mr. Jessica is a 61yoM with HFrEF 10-15% presenting with ADHF.     #HFrEF 2/2 ICM  #ADHF  Warm and Wet. No Resp distress.  BNP: 2777 in setting of obese body habitus  Recommendations  - Tele  - Strict Lytes: K:4, M  - Daily standing weights prior to PO intake  - Strict I &O's  - Continue Home metoprolol  - Diurese goal Net negative 1-2L daily.   - Patient received IV lasix 80 this afternoon. Unclear response based on chart data.   - if inadequate response recommend increase dose to lasix 160mg BID  - Further inadequate response my require gtt vs thiazide augmentation.   - HF service to evaluate pt in am.     Not incomplete until attending cosign.  Mr. Jessica is a 61yoM with PMHx of HTN, CAD s/p stents, HFrEF s/p AICD, T2DM on insulin, osteo of L toe s/p amputation, recent tobacco use (quit 1mo prior to presentation), who presented with several weeks of lower extremity edema, orthopnea, and abdominal distension. Admitted for ADHF.     1. HFrEF (EF 10-15%)  - Significantly volume overloaded on exam  - Stop Lasix IV.  - Bumex 4mg IV push followed by 2mg/hr gtt  - K 3.2 this AM, please replete to goal > 4 prior to further diuresis  - Hold Entresto, will likely resume low dose over next 1-2 days  - Continue Metoprolol Succinate 50mg daily  - Start Aldactone 25mg daily  - Daily standing weights, strict I+Os  - Check daily lactate, LFTs   - Monitor on Telemetry    2. [employer] subcutaneous ICD  - Please have EP interrogate device     3. CAD s/p PCIs (most recent NERI to Circumflex in 12/2019)  - On DAPT and high intensity statin Mr. Jessica is a 61yoM with PMHx of HTN, CAD s/p stents, HFrEF s/p AICD, T2DM on insulin, osteo of L toe s/p amputation, recent tobacco use (quit 1mo prior to presentation), who presented with several weeks of lower extremity edema, orthopnea, and abdominal distension. Admitted for ADHF.     1. HFrEF (EF 10-15%)  - Significantly volume overloaded on exam  - Stop Lasix IV.  - Bumex 4mg IV push followed by 2mg/hr gtt  - K 3.2 this AM, please replete to goal > 4 prior to further diuresis  - Hold Entresto, will likely resume low dose over next 1-2 days  - Continue Metoprolol Succinate 50mg daily  - Start Aldactone 25mg daily  - Daily standing weights, strict I+Os  - Check daily lactate, LFTs   - F/u TTE  - Monitor on Telemetry  - Keep K > 4, Mag > 2    2. [employer] subcutaneous ICD  - Please have EP interrogate device     3. CAD s/p PCIs (most recent NERI to Circumflex in 12/2019)  - On DAPT and high intensity statin

## 2022-09-30 NOTE — H&P ADULT - PROBLEM SELECTOR PLAN 2
likely iso worsening HF  -holding entresto, will restart if stabilizes/improves  -diuresis as above  -monitor urine output

## 2022-09-30 NOTE — CONSULT NOTE ADULT - SUBJECTIVE AND OBJECTIVE BOX
Patient seen and evaluated at bedside    Reason for consult: Adv HF evaluation     HPI:    62 y/o M presenting with LE swelling for 1 week    HPI: 62 y/o M with h/o HTN, CAD s/p stents, HFrEF s/p AICD, T2DM on insulin, osteo of L toe s/p amputation presenting with worsening LE edema for 1 week. Over the past week, patient has noticed worsening b/l LE swelling and tightness as well as decreased ET. Denies sick contacts/changes in diet/decrease in medications. Has been following up with outpatient cardiology who over this time period has been uptitrating outpatient lasix, last to po 80 and 40. Given pain and swelling continued presented to ED. of note had outpatient bilat dupplex neg for DVt. Denies cough/cp/palpitations/ab pain/dizziness/lightheadedness.     Patient has long hx of ischemic cardiomyopathy starting in 2004 s/P MI  recent exacerbation of heart failure symptoms with fluid overload. Has had renal failure acute at one point requiring dialysis  Now with increased abdominal girth sob increasing edema      < from: TTE with Doppler (w/Cont) (09.09.20 @ 05:44) >  EF (Visual Estimate): 15-20 %  ------------------------------------------------------------------------  Observations:  Mitral Valve: Tethered mitral valve leaflets with normal  opening-not well visualized. Minimal mitral regurgitation.  Aortic Valve/Aorta: Aortic valve not well visualized;  appears calcified. Peak left ventricular outflow tract  gradient equals 1 mm Hg, LVOT velocity time integral equals  8 cm.  Not well visualized.  Left Atrium: Left atrium not well visualized, probably  normal.  Left Ventricle: Endocardial visualization enhanced with  intravenous injection of Ultrasonic Enhancing Agent  (Definity).  Severe global left ventricular systolic  dysfunction.No obvious LV thrombus. Estimated EF of 15-20%.  No left ventricular thrombus. Left ventricular enlargement.  Severe diastolic dysfunction (Stage III).  Right Heart: A device wire is noted in the right heart. The  right ventricle is not well visualized. Tricuspid valve not  well visualized. No tricuspid regurgitation. Pulmonic valve  not well visualized.  Pericardium/Pleura: Normal pericardium with no pericardial  effusion.  Hemodynamic: Estimated right atrial pressure is 8 mm Hg.  ------------------------------------------------------------------------  Conclusions:  1. Aortic valve not well visualized; appears calcified.  2. Left ventricular enlargement.  3. Endocardial visualization enhanced with intravenous  injection of Ultrasonic Enhancing Agent (Definity).  Severe  global left ventricular systolic dysfunction.No obvious LV  thrombus. Estimated EF of 15-20%. No left ventricular  thrombus.  4. Severe diastolic dysfunction (Stage III).  5. A device wire is noted in the right heart.  6. Tricuspid valve not well visualized. No tricuspid  regurgitation.  7. Pulmonic valve not well visualized.  Unable to rule out endocarditis. Consider DONNIE if clinically  indicated.    < end of copied text >      PMHx:     Stented coronary artery    Diabetes    AICD (automatic cardioverter/defibrillator) present    Hypertension    Heart failure with reduced ejection fraction    History of ischemic cardiomyopathy    History of COPD    H/O gastroesophageal reflux (GERD)        PSHx:   No significant past surgical history    H/O vasectomy        Allergies:  No Known Allergies      Home Meds:    Current Medications:   aspirin  chewable 81 milliGRAM(s) Oral daily  atorvastatin 80 milliGRAM(s) Oral at bedtime  dextrose 5%. 1000 milliLiter(s) IV Continuous <Continuous>  dextrose 5%. 1000 milliLiter(s) IV Continuous <Continuous>  dextrose 50% Injectable 25 Gram(s) IV Push once  dextrose 50% Injectable 12.5 Gram(s) IV Push once  dextrose 50% Injectable 25 Gram(s) IV Push once  dextrose Oral Gel 15 Gram(s) Oral once PRN  glucagon  Injectable 1 milliGRAM(s) IntraMuscular once  heparin   Injectable 5000 Unit(s) SubCutaneous every 8 hours  insulin glargine Injectable (LANTUS) 40 Unit(s) SubCutaneous at bedtime  insulin lispro (ADMELOG) corrective regimen sliding scale   SubCutaneous three times a day before meals  metoprolol succinate ER 50 milliGRAM(s) Oral daily  pantoprazole    Tablet 40 milliGRAM(s) Oral before breakfast      FAMILY HISTORY:  Family history of COPD (chronic obstructive pulmonary disease) (Sibling)    Family history of cardiac disorder  Paternal      REVIEW OF SYSTEMS:  CONSTITUTIONAL: No weakness, fevers or chills  EYES/ENT: No visual changes;  No dysphagia  NECK: No pain or stiffness  RESPIRATORY: No cough, wheezing, hemoptysis; No shortness of breath  CARDIOVASCULAR: No chest pain or palpitations; Significant lower extremity edema  GASTROINTESTINAL: No abdominal, but + abdominal swelling.   BACK: No back pain  GENITOURINARY: No dysuria, frequency or hematuria  NEUROLOGICAL: No numbness or weakness  SKIN: No itching, burning, rashes, or lesions   All other review of systems is negative unless indicated above.      Physical Exam:  T(F): 98 (09-30), Max: 98 (09-30)  HR: 103 (09-30) (96 - 103)  BP: 113/78 (09-30) (103/70 - 113/78)  RR: 20 (09-30)  SpO2: 99% (09-30)  GENERAL: No acute distress, well-developed  HEAD:  Atraumatic, Normocephalic  ENT: EOMI, PERRLA, conjunctiva and sclera clear, Neck supple  CHEST/LUNG: Clear to auscultation bilaterally  BACK: No spinal tenderness  HEART: Regular rate and rhythm; No murmur. S3  ABDOMEN: edematous   EXTREMITIES:  3+ edema  PSYCH: Nl behavior, nl affect  NEUROLOGY: AAOx3, non-focal, cranial nerves intact  SKIN: Normal color, No rashes or lesions      CXR: Personally reviewed    Labs: Personally reviewed                        10.0   5.70  )-----------( 194      ( 30 Sep 2022 13:45 )             34.9     09-30    141  |  106  |  42<H>  ----------------------------<  146<H>  3.1<L>   |  22  |  1.65<H>    Ca    8.7      30 Sep 2022 13:45  Mg     1.8     09-30    TPro  7.1  /  Alb  3.5  /  TBili  0.6  /  DBili  x   /  AST  20  /  ALT  17  /  AlkPhos  118  09-30    PT/INR - ( 30 Sep 2022 13:45 )   PT: 16.1 sec;   INR: 1.38 ratio         PTT - ( 30 Sep 2022 13:45 )  PTT:31.6 sec  Serum Pro-Brain Natriuretic Peptide: 2777 pg/mL (09-30 @ 13:45)        Thyroid Stimulating Hormone, Serum: 3.02 uIU/mL (09-30 @ 13:45)   Patient seen and evaluated at bedside    Reason for consult: Adv HF evaluation     HPI:    60 y/o M presenting with LE swelling for 1 week    HPI: 60 y/o M with h/o HTN, CAD s/p stents, HFrEF s/p AICD, T2DM on insulin, osteo of L toe s/p amputation presenting with worsening LE edema for 1 week. Over the past week, patient has noticed worsening b/l LE swelling and tightness as well as decreased ET. Denies sick contacts/changes in diet/decrease in medications. Has been following up with outpatient cardiology who over this time period has been uptitrating outpatient lasix, last to po 80 and 40. Given pain and swelling continued presented to ED. of note had outpatient bilat dupplex neg for DVt. Denies cough/cp/palpitations/ab pain/dizziness/lightheadedness.     Patient has long hx of ischemic cardiomyopathy starting in 2004 s/P MI  recent exacerbation of heart failure symptoms with fluid overload. Has had renal failure acute at one point requiring dialysis  Now with increased abdominal girth sob increasing edema    Was smoking 1/2-1pack cigarettes daily up until one month prior to presentation.     Was recently admitted for 4 weeks in [place] for septic shock.       < from: TTE with Doppler (w/Cont) (09.09.20 @ 05:44) >  EF (Visual Estimate): 15-20 %  ------------------------------------------------------------------------  Observations:  Mitral Valve: Tethered mitral valve leaflets with normal  opening-not well visualized. Minimal mitral regurgitation.  Aortic Valve/Aorta: Aortic valve not well visualized;  appears calcified. Peak left ventricular outflow tract  gradient equals 1 mm Hg, LVOT velocity time integral equals  8 cm.  Not well visualized.  Left Atrium: Left atrium not well visualized, probably  normal.  Left Ventricle: Endocardial visualization enhanced with  intravenous injection of Ultrasonic Enhancing Agent  (Definity).  Severe global left ventricular systolic  dysfunction.No obvious LV thrombus. Estimated EF of 15-20%.  No left ventricular thrombus. Left ventricular enlargement.  Severe diastolic dysfunction (Stage III).  Right Heart: A device wire is noted in the right heart. The  right ventricle is not well visualized. Tricuspid valve not  well visualized. No tricuspid regurgitation. Pulmonic valve  not well visualized.  Pericardium/Pleura: Normal pericardium with no pericardial  effusion.  Hemodynamic: Estimated right atrial pressure is 8 mm Hg.  ------------------------------------------------------------------------  Conclusions:  1. Aortic valve not well visualized; appears calcified.  2. Left ventricular enlargement.  3. Endocardial visualization enhanced with intravenous  injection of Ultrasonic Enhancing Agent (Definity).  Severe  global left ventricular systolic dysfunction.No obvious LV  thrombus. Estimated EF of 15-20%. No left ventricular  thrombus.  4. Severe diastolic dysfunction (Stage III).  5. A device wire is noted in the right heart.  6. Tricuspid valve not well visualized. No tricuspid  regurgitation.  7. Pulmonic valve not well visualized.  Unable to rule out endocarditis. Consider DONNIE if clinically  indicated.    < end of copied text >      PMHx:     Stented coronary artery    Diabetes    AICD (automatic cardioverter/defibrillator) present    Hypertension    Heart failure with reduced ejection fraction    History of ischemic cardiomyopathy    History of COPD    H/O gastroesophageal reflux (GERD)        PSHx:   No significant past surgical history    H/O vasectomy        Allergies:  No Known Allergies      Home Meds:    Current Medications:   aspirin  chewable 81 milliGRAM(s) Oral daily  atorvastatin 80 milliGRAM(s) Oral at bedtime  dextrose 5%. 1000 milliLiter(s) IV Continuous <Continuous>  dextrose 5%. 1000 milliLiter(s) IV Continuous <Continuous>  dextrose 50% Injectable 25 Gram(s) IV Push once  dextrose 50% Injectable 12.5 Gram(s) IV Push once  dextrose 50% Injectable 25 Gram(s) IV Push once  dextrose Oral Gel 15 Gram(s) Oral once PRN  glucagon  Injectable 1 milliGRAM(s) IntraMuscular once  heparin   Injectable 5000 Unit(s) SubCutaneous every 8 hours  insulin glargine Injectable (LANTUS) 40 Unit(s) SubCutaneous at bedtime  insulin lispro (ADMELOG) corrective regimen sliding scale   SubCutaneous three times a day before meals  metoprolol succinate ER 50 milliGRAM(s) Oral daily  pantoprazole    Tablet 40 milliGRAM(s) Oral before breakfast      FAMILY HISTORY:  Family history of COPD (chronic obstructive pulmonary disease) (Sibling)    Family history of cardiac disorder  Paternal      REVIEW OF SYSTEMS:  CONSTITUTIONAL: No weakness, fevers or chills  EYES/ENT: No visual changes;  No dysphagia  NECK: No pain or stiffness  RESPIRATORY: No cough, wheezing, hemoptysis; No shortness of breath  CARDIOVASCULAR: No chest pain or palpitations; Significant lower extremity edema\  GASTROINTESTINAL: No abdominal, but + abdominal swelling.   BACK: No back pain  GENITOURINARY: No dysuria, frequency or hematuria  NEUROLOGICAL: No numbness or weakness  SKIN: No itching, burning, rashes, or lesions   All other review of systems is negative unless indicated above.      Physical Exam:  T(F): 98 (09-30), Max: 98 (09-30)  HR: 103 (09-30) (96 - 103)  BP: 113/78 (09-30) (103/70 - 113/78)  RR: 20 (09-30)  SpO2: 99% (09-30)  GENERAL: No acute distress, well-developed  HEAD:  Atraumatic, Normocephalic  ENT: EOMI, PERRLA, conjunctiva and sclera clear, Neck supple  CHEST/LUNG: Clear to auscultation bilaterally  BACK: No spinal tenderness  HEART: Regular rate and rhythm; No murmur. S3, , JVD elevated 14-16cm  ABDOMEN: edematous   EXTREMITIES:  3+ edema  PSYCH: Nl behavior, nl affect  NEUROLOGY: AAOx3, non-focal, cranial nerves intact  SKIN: Normal color, No rashes or lesions      CXR: Personally reviewed    Labs: Personally reviewed                        10.0   5.70  )-----------( 194      ( 30 Sep 2022 13:45 )             34.9     09-30    141  |  106  |  42<H>  ----------------------------<  146<H>  3.1<L>   |  22  |  1.65<H>    Ca    8.7      30 Sep 2022 13:45  Mg     1.8     09-30    TPro  7.1  /  Alb  3.5  /  TBili  0.6  /  DBili  x   /  AST  20  /  ALT  17  /  AlkPhos  118  09-30    PT/INR - ( 30 Sep 2022 13:45 )   PT: 16.1 sec;   INR: 1.38 ratio         PTT - ( 30 Sep 2022 13:45 )  PTT:31.6 sec  Serum Pro-Brain Natriuretic Peptide: 2777 pg/mL (09-30 @ 13:45)        Thyroid Stimulating Hormone, Serum: 3.02 uIU/mL (09-30 @ 13:45)

## 2022-09-30 NOTE — ED ADULT TRIAGE NOTE - BP NONINVASIVE SYSTOLIC (MM HG)
[Unlimited ADLs] : able to do activities of daily living without limitations [No Sports] : unable to participate in sports [FreeTextEntry1] : Ricardo is a 8yo boy referred by his pediatrician for follow-up of joint pains in the setting of elevated inflammatory markers (ESR 82, CRP 2). Ricardo is with his older brother today with dad on speaker phone – Dad gave verbal consent for information to be discussed with brother. \par \par Ricardo had intermittent URI infections at the start of this winter season. A few days prior to [place], family reports that Ricardo had right knee swelling and was complaining of right leg pain. He was limping and uncomfortable. He did not have fevers, rashes, or AM stiffness surrounding these symptoms – but report neck pain. He was taken to [facility], where he was admitted from 12/23-12/24/19. Appears patient was worked up for septic arthritis: xray/mri right knee showed trace effusion, reports fluid aspiration in the hospital and ultimately discharged with diagnosis of toxic synovitis. \par \par Now, patient has remained afebrile, but endorses worsening neck pain and bilateral knee pain. The pain seems to be worse in the AM with questionable stiffness. Although Ricardo is not appearing to limp, he does report his discomfort to his family members when it bothers him. The extremity pain seems to improve as the day progresses. Family and Ricardo endorse that he is unable to move his neck on its own and has been moving his upper torso to turn and talk to family members. Unsure if he is anxious because of the pain, but older brother reports that Ricardo’ behavior has significantly changed this month due to these symptoms (more anxious, not as active, not as talkative/social, decrease in appetite). \par \par Deny any abdominal pain, nausea, vomiting, or diarrhea. Report ~3lb weight loss since December, but report increase in height since then. Denies any rashes, oral sores, palpitations, chest pain, or shortness of breath. Ricardo has not been able to participate in his basketball league because moving too much hurts his neck. He has been attending school, but family notices that he has had change in concentration/attention, which they think is surrounding the anxiety around his neck pain/knee pains. Dad reports that he has given him motrin ~two times over this month, with relief of these symptoms. \par  [Rheumatoid Arthritis] : no Rheumatoid Arthritis [Juvenile Rheumatoid Arthritis] : no Juvenile Rheumatoid Arthritis [Ankylosing Spondylitis] : no Ankylosing Spondylitis [Psoriasis] : no Psoriasis [Diabetes Mellitus (type 1 - insulin dependent)] : no Type 1 Diabetes Mellitus [Systemic Lupus Erythematosus] : no Systemic Lupus Erythematosus 103 [Raynaud's Disease] : no Raynaud's Disease [IBD - Crohns] : no Crohn's Inflammatory Bowel disease [IBD - Ulcerative Colitis] : no Ulcerative Colitis Inflammatory Bowel Disease [Graves' Disease] : no Graves' Disease [Multiple Sclerosis] : no Multiple Sclerosis [de-identified] : Thyroid disease in family - unsure which kinds (please see family history below)

## 2022-09-30 NOTE — ED PROVIDER NOTE - NS ED ROS FT
Gen: Denies fever.   HEENT: Denies headache. Denies congestion.  CV: +chest pain. Denies lightheadedness.  Skin: Denies rash.   Resp: Denies SOB. Denies cough.  GI: Denies abd pain. Denies nausea. Denies vomiting. Denies diarrhea. Denies melena. Denies hematochezia.  Msk: +extremity swelling. Denies extremity pain.  : Denies dysuria. Denies hematuria.  Neuro: Denies LOC. Denies dizziness. Denies new numbness/tingling. Denies new focal weakness.  Psych: Denies SI

## 2022-09-30 NOTE — ED PROVIDER NOTE - CLINICAL SUMMARY MEDICAL DECISION MAKING FREE TEXT BOX
61 M PMHx HTN, CAD/MI s/p stents, HFrEF s/p AICD, T2DM, h/o osteomyelitis s/p left toe amputation presenting for 1 wk of increasing leg swelling bilat and SOB, worse w/ exertion. Outpt cardiology sent in for further management. Taking 80 mg and 40 mg lasix daily, still w/ increased SOB and leg swelling. Bilat duplex LE negative for DVT yesterday per pt. Denies CP. Has chronic cough. Denies fevers or chills. Denies palpitations. Exam as above. Concern for CHF, occult infection, pna, electrolyte abnormality. Labs, cxr, ecg, will reassess.

## 2022-10-01 LAB
A1C WITH ESTIMATED AVERAGE GLUCOSE RESULT: 7 % — HIGH (ref 4–5.6)
ANION GAP SERPL CALC-SCNC: 12 MMOL/L — SIGNIFICANT CHANGE UP (ref 5–17)
ANION GAP SERPL CALC-SCNC: 14 MMOL/L — SIGNIFICANT CHANGE UP (ref 5–17)
BUN SERPL-MCNC: 35 MG/DL — HIGH (ref 7–23)
BUN SERPL-MCNC: 38 MG/DL — HIGH (ref 7–23)
CALCIUM SERPL-MCNC: 8.8 MG/DL — SIGNIFICANT CHANGE UP (ref 8.4–10.5)
CALCIUM SERPL-MCNC: 9 MG/DL — SIGNIFICANT CHANGE UP (ref 8.4–10.5)
CHLORIDE SERPL-SCNC: 105 MMOL/L — SIGNIFICANT CHANGE UP (ref 96–108)
CHLORIDE SERPL-SCNC: 106 MMOL/L — SIGNIFICANT CHANGE UP (ref 96–108)
CO2 SERPL-SCNC: 22 MMOL/L — SIGNIFICANT CHANGE UP (ref 22–31)
CO2 SERPL-SCNC: 23 MMOL/L — SIGNIFICANT CHANGE UP (ref 22–31)
CREAT SERPL-MCNC: 1.53 MG/DL — HIGH (ref 0.5–1.3)
CREAT SERPL-MCNC: 1.55 MG/DL — HIGH (ref 0.5–1.3)
EGFR: 51 ML/MIN/1.73M2 — LOW
EGFR: 52 ML/MIN/1.73M2 — LOW
ESTIMATED AVERAGE GLUCOSE: 154 MG/DL — HIGH (ref 68–114)
GLUCOSE BLDC GLUCOMTR-MCNC: 131 MG/DL — HIGH (ref 70–99)
GLUCOSE BLDC GLUCOMTR-MCNC: 148 MG/DL — HIGH (ref 70–99)
GLUCOSE BLDC GLUCOMTR-MCNC: 155 MG/DL — HIGH (ref 70–99)
GLUCOSE BLDC GLUCOMTR-MCNC: 221 MG/DL — HIGH (ref 70–99)
GLUCOSE SERPL-MCNC: 151 MG/DL — HIGH (ref 70–99)
GLUCOSE SERPL-MCNC: 159 MG/DL — HIGH (ref 70–99)
HCT VFR BLD CALC: 33.2 % — LOW (ref 39–50)
HGB BLD-MCNC: 9.6 G/DL — LOW (ref 13–17)
MAGNESIUM SERPL-MCNC: 1.9 MG/DL — SIGNIFICANT CHANGE UP (ref 1.6–2.6)
MCHC RBC-ENTMCNC: 23.4 PG — LOW (ref 27–34)
MCHC RBC-ENTMCNC: 28.9 GM/DL — LOW (ref 32–36)
MCV RBC AUTO: 80.8 FL — SIGNIFICANT CHANGE UP (ref 80–100)
NRBC # BLD: 0 /100 WBCS — SIGNIFICANT CHANGE UP (ref 0–0)
PLATELET # BLD AUTO: 187 K/UL — SIGNIFICANT CHANGE UP (ref 150–400)
POTASSIUM SERPL-MCNC: 3.2 MMOL/L — LOW (ref 3.5–5.3)
POTASSIUM SERPL-MCNC: 3.4 MMOL/L — LOW (ref 3.5–5.3)
POTASSIUM SERPL-SCNC: 3.2 MMOL/L — LOW (ref 3.5–5.3)
POTASSIUM SERPL-SCNC: 3.4 MMOL/L — LOW (ref 3.5–5.3)
RBC # BLD: 4.11 M/UL — LOW (ref 4.2–5.8)
RBC # FLD: 20.3 % — HIGH (ref 10.3–14.5)
SODIUM SERPL-SCNC: 140 MMOL/L — SIGNIFICANT CHANGE UP (ref 135–145)
SODIUM SERPL-SCNC: 142 MMOL/L — SIGNIFICANT CHANGE UP (ref 135–145)
WBC # BLD: 5.99 K/UL — SIGNIFICANT CHANGE UP (ref 3.8–10.5)
WBC # FLD AUTO: 5.99 K/UL — SIGNIFICANT CHANGE UP (ref 3.8–10.5)

## 2022-10-01 PROCEDURE — 99222 1ST HOSP IP/OBS MODERATE 55: CPT

## 2022-10-01 RX ORDER — SPIRONOLACTONE 25 MG/1
25 TABLET, FILM COATED ORAL DAILY
Refills: 0 | Status: DISCONTINUED | OUTPATIENT
Start: 2022-10-01 | End: 2022-10-06

## 2022-10-01 RX ORDER — POTASSIUM CHLORIDE 20 MEQ
10 PACKET (EA) ORAL ONCE
Refills: 0 | Status: COMPLETED | OUTPATIENT
Start: 2022-10-01 | End: 2022-10-01

## 2022-10-01 RX ORDER — BUMETANIDE 0.25 MG/ML
2 INJECTION INTRAMUSCULAR; INTRAVENOUS
Qty: 20 | Refills: 0 | Status: DISCONTINUED | OUTPATIENT
Start: 2022-10-01 | End: 2022-10-03

## 2022-10-01 RX ORDER — MAGNESIUM SULFATE 500 MG/ML
1 VIAL (ML) INJECTION ONCE
Refills: 0 | Status: COMPLETED | OUTPATIENT
Start: 2022-10-01 | End: 2022-10-01

## 2022-10-01 RX ORDER — POTASSIUM CHLORIDE 20 MEQ
40 PACKET (EA) ORAL EVERY 4 HOURS
Refills: 0 | Status: COMPLETED | OUTPATIENT
Start: 2022-10-01 | End: 2022-10-01

## 2022-10-01 RX ORDER — ACETAMINOPHEN 500 MG
650 TABLET ORAL EVERY 6 HOURS
Refills: 0 | Status: DISCONTINUED | OUTPATIENT
Start: 2022-10-01 | End: 2022-10-07

## 2022-10-01 RX ORDER — POTASSIUM CHLORIDE 20 MEQ
40 PACKET (EA) ORAL EVERY 4 HOURS
Refills: 0 | Status: COMPLETED | OUTPATIENT
Start: 2022-10-01 | End: 2022-10-02

## 2022-10-01 RX ORDER — BUMETANIDE 0.25 MG/ML
4 INJECTION INTRAMUSCULAR; INTRAVENOUS ONCE
Refills: 0 | Status: COMPLETED | OUTPATIENT
Start: 2022-10-01 | End: 2022-10-02

## 2022-10-01 RX ADMIN — Medication 40 MILLIEQUIVALENT(S): at 21:56

## 2022-10-01 RX ADMIN — Medication 50 MILLIEQUIVALENT(S): at 16:53

## 2022-10-01 RX ADMIN — Medication 40 MILLIEQUIVALENT(S): at 08:46

## 2022-10-01 RX ADMIN — Medication 650 MILLIGRAM(S): at 17:15

## 2022-10-01 RX ADMIN — Medication 40 MILLIEQUIVALENT(S): at 16:12

## 2022-10-01 RX ADMIN — PANTOPRAZOLE SODIUM 40 MILLIGRAM(S): 20 TABLET, DELAYED RELEASE ORAL at 06:00

## 2022-10-01 RX ADMIN — Medication 650 MILLIGRAM(S): at 09:57

## 2022-10-01 RX ADMIN — INSULIN GLARGINE 40 UNIT(S): 100 INJECTION, SOLUTION SUBCUTANEOUS at 21:56

## 2022-10-01 RX ADMIN — Medication 650 MILLIGRAM(S): at 10:49

## 2022-10-01 RX ADMIN — Medication 40 MILLIEQUIVALENT(S): at 12:50

## 2022-10-01 RX ADMIN — Medication 2: at 08:45

## 2022-10-01 RX ADMIN — Medication 650 MILLIGRAM(S): at 17:45

## 2022-10-01 RX ADMIN — HEPARIN SODIUM 5000 UNIT(S): 5000 INJECTION INTRAVENOUS; SUBCUTANEOUS at 05:59

## 2022-10-01 RX ADMIN — Medication 81 MILLIGRAM(S): at 10:53

## 2022-10-01 RX ADMIN — ATORVASTATIN CALCIUM 80 MILLIGRAM(S): 80 TABLET, FILM COATED ORAL at 21:57

## 2022-10-01 RX ADMIN — Medication 50 MILLIGRAM(S): at 06:00

## 2022-10-01 RX ADMIN — HEPARIN SODIUM 5000 UNIT(S): 5000 INJECTION INTRAVENOUS; SUBCUTANEOUS at 21:56

## 2022-10-01 RX ADMIN — Medication 100 GRAM(S): at 10:59

## 2022-10-01 RX ADMIN — SPIRONOLACTONE 25 MILLIGRAM(S): 25 TABLET, FILM COATED ORAL at 16:52

## 2022-10-01 RX ADMIN — Medication 80 MILLIGRAM(S): at 06:00

## 2022-10-01 RX ADMIN — HEPARIN SODIUM 5000 UNIT(S): 5000 INJECTION INTRAVENOUS; SUBCUTANEOUS at 16:13

## 2022-10-01 RX ADMIN — CLOPIDOGREL BISULFATE 75 MILLIGRAM(S): 75 TABLET, FILM COATED ORAL at 10:53

## 2022-10-01 NOTE — PATIENT PROFILE ADULT - FALL HARM RISK - UNIVERSAL INTERVENTIONS
Bed in lowest position, wheels locked, appropriate side rails in place/Call bell, personal items and telephone in reach/Instruct patient to call for assistance before getting out of bed or chair/Non-slip footwear when patient is out of bed/[place] to call system/Physically safe environment - no spills, clutter or unnecessary equipment/Purposeful Proactive Rounding/Room/bathroom lighting operational, light cord in reach

## 2022-10-01 NOTE — CONSULT NOTE ADULT - ASSESSMENT
61 year old Male with PHSx HTN, CAD s/p stents, HFrEF s/p AICD, T2DM on insulin, OM of L toe s/p amputation, He presents to ED with worsening LE edema for 1 week.       1- CKD III  2- CHF  3- T2DM  4- anemia  5- hypokalemia      overall creatinine is better than previously.  patient states baseline creatinine range 2.0-2.3 g/dL  renal sono reviewed from 7/2022 right moderate to severe chronic hydronephrosis   significantly fluid overloaded on exam  agree with bumex drip @ 2mg/hr as BP allows  KCL supplementation,   trend K+  consider adding aldactone 25 mg daily as bp allows  insulin sliding scale  check protein/creatinine ratio  anemia, trend hgb  check retic count and iron studies  check pth, and uric acid level  strict I/O  monitor daily standing weight  keep O>I  trend creatinine and electrolytes

## 2022-10-01 NOTE — PROGRESS NOTE ADULT - SUBJECTIVE AND OBJECTIVE BOX
Patient is a 61y old  Male who presents with a chief complaint of LE swelling (01 Oct 2022 13:42)      SUBJECTIVE / OVERNIGHT EVENTS: no events over night     T(C): 36.6 (10-01-22 @ 12:07), Max: 36.6 (10-01-22 @ 12:07)  HR: 98 (10-01-22 @ 12:07) (98 - 104)  BP: 108/74 (10-01-22 @ 12:07) (97/65 - 108/74)  RR: 12 (10-01-22 @ 12:07) (12 - 19)  SpO2: 98% (10-01-22 @ 12:07) (97% - 98%)    MEDICATIONS  (STANDING):  aspirin  chewable 81 milliGRAM(s) Oral daily  atorvastatin 80 milliGRAM(s) Oral at bedtime  buMETAnide Infusion 2 mG/Hr (10 mL/Hr) IV Continuous <Continuous>  buMETAnide IVPB 4 milliGRAM(s) IV Intermittent once  clopidogrel Tablet 75 milliGRAM(s) Oral daily  dextrose 5%. 1000 milliLiter(s) (50 mL/Hr) IV Continuous <Continuous>  dextrose 5%. 1000 milliLiter(s) (100 mL/Hr) IV Continuous <Continuous>  dextrose 50% Injectable 25 Gram(s) IV Push once  dextrose 50% Injectable 12.5 Gram(s) IV Push once  dextrose 50% Injectable 25 Gram(s) IV Push once  glucagon  Injectable 1 milliGRAM(s) IntraMuscular once  heparin   Injectable 5000 Unit(s) SubCutaneous every 8 hours  insulin glargine Injectable (LANTUS) 40 Unit(s) SubCutaneous at bedtime  insulin lispro (ADMELOG) corrective regimen sliding scale   SubCutaneous three times a day before meals  metoprolol succinate ER 50 milliGRAM(s) Oral daily  pantoprazole    Tablet 40 milliGRAM(s) Oral before breakfast    MEDICATIONS  (PRN):  acetaminophen     Tablet .. 650 milliGRAM(s) Oral every 6 hours PRN Mild Pain (1 - 3), Moderate Pain (4 - 6)  dextrose Oral Gel 15 Gram(s) Oral once PRN Blood Glucose LESS THAN 70 milliGRAM(s)/deciliter        PHYSICAL EXAM:  GENERAL: NAD, well-developed  HEAD:  Atraumatic, Normocephalic  EYES: EOMI,  conjunctiva and sclera clear  NECK: Supple, No JVD  CHEST/LUNG: Clear to auscultation bilaterally; No wheeze  HEART: Regular rate and rhythm; No murmurs, rubs, or gallops  ABDOMEN: Soft, Nontender, Nondistended; Bowel sounds present  EXTREMITIES:  2+ Peripheral Pulses, No clubbing, cyanosis, or edema  PSYCH: AAOx3  NEUROLOGY: non-focal  SKIN: No rashes or lesions                           9.6    5.99  )-----------( 187      ( 01 Oct 2022 06:04 )             33.2       CARDIAC MARKERS ( 30 Sep 2022 15:01 )  x     / x     / 408 U/L / x     / 11.5 ng/mL      LIVER FUNCTIONS - ( 30 Sep 2022 13:45 )  Alb: 3.5 g/dL / Pro: 7.1 g/dL / ALK PHOS: 118 U/L / ALT: 17 U/L / AST: 20 U/L / GGT: x           PT/INR - ( 30 Sep 2022 13:45 )   PT: 16.1 sec;   INR: 1.38 ratio         PTT - ( 30 Sep 2022 13:45 )  PTT:31.6 sec  142|105|35<159  3.4|23|1.55  9.0,--,--  10-01 @ 14:24  140|106|38<151  3.2|22|1.53  8.8,1.9,--  10-01 @ 06:05    CAPILLARY BLOOD GLUCOSE      POCT Blood Glucose.: 148 mg/dL (01 Oct 2022 12:07)  POCT Blood Glucose.: 155 mg/dL (01 Oct 2022 08:41)  POCT Blood Glucose.: 180 mg/dL (30 Sep 2022 22:28)      RADIOLOGY & ADDITIONAL TESTS:    Imaging Personally Reviewed:    Consultant(s) Notes Reviewed:      Care Discussed with Consultants/Other Providers:

## 2022-10-01 NOTE — CONSULT NOTE ADULT - NS ATTEND AMEND GEN_ALL_CORE FT
pt with ckd with baseline cr 2.3 range with recent septic shock with MANJIT leading to one hd as documented and as per pt. pt is now with decompensated chf excessive fluid overload and hypokalemia  add aldactone 25 mg daily   bumex drip @ 2mg/hr   keep O> I   anemia check retic ct

## 2022-10-01 NOTE — CONSULT NOTE ADULT - SUBJECTIVE AND OBJECTIVE BOX
[facility]  [phone]  NEPHROLOGY      INITIAL CONSULT NOTE  --------------------------------------------------------------------------------  HPI:    61 year old Male with PHSx HTN, CAD s/p stents, HFrEF s/p AICD, T2DM on insulin, OM of L toe s/p amputation, He had a recent hospitalization in [place] and was treated for septic shock secondary to cellulitis, complicated by MANJIT (cr 5.7 g/dL) requiring HD x 1 treatment, with recovery to creatinine of 1.9 g/dL on discharge from hospital. He presents to ED with worsening LE edema for 1 week. Over the past week, patient has noticed worsening b/l LE swelling and tightness. Outpatient cardiology had been uptitrating outpatient lasix, last to po 80 and 40. Given R calf pain and B/L LE swelling presented to ED. Of note he had outpatient bilat dupplex neg for DVT. In ED BUN/Cr 42/1.65 (1.41 on previous admission), proBNP 2777 (1K on previous admission). CXR with mild R sided pleural effusion. and started on lasix 80 mg IV BID. States his right calf pain was relieved by Tylenol and when swelling improved. Given his recent abnormal creatinine, renal consult called.      PAST HISTORY  --------------------------------------------------------------------------------  PAST MEDICAL & SURGICAL HISTORY:  Stented coronary artery      Diabetes      AICD (automatic cardioverter/defibrillator) present      Hypertension      Heart failure with reduced ejection fraction      History of ischemic cardiomyopathy      History of COPD      H/O gastroesophageal reflux (GERD)      H/O vasectomy  20 yrs ago (2000)        FAMILY HISTORY:  Family history of COPD (chronic obstructive pulmonary disease) (Sibling)    Family history of cardiac disorder  Paternal      PAST SOCIAL HISTORY:  Former smoker    ALLERGIES & MEDICATIONS  --------------------------------------------------------------------------------  Allergies    No Known Allergies    Intolerances      Standing Inpatient Medications  aspirin  chewable 81 milliGRAM(s) Oral daily  atorvastatin 80 milliGRAM(s) Oral at bedtime  buMETAnide Infusion 2 mG/Hr IV Continuous <Continuous>  buMETAnide IVPB 4 milliGRAM(s) IV Intermittent once  clopidogrel Tablet 75 milliGRAM(s) Oral daily  dextrose 5%. 1000 milliLiter(s) IV Continuous <Continuous>  dextrose 5%. 1000 milliLiter(s) IV Continuous <Continuous>  dextrose 50% Injectable 25 Gram(s) IV Push once  dextrose 50% Injectable 12.5 Gram(s) IV Push once  dextrose 50% Injectable 25 Gram(s) IV Push once  glucagon  Injectable 1 milliGRAM(s) IntraMuscular once  heparin   Injectable 5000 Unit(s) SubCutaneous every 8 hours  insulin glargine Injectable (LANTUS) 40 Unit(s) SubCutaneous at bedtime  insulin lispro (ADMELOG) corrective regimen sliding scale   SubCutaneous three times a day before meals  metoprolol succinate ER 50 milliGRAM(s) Oral daily  pantoprazole    Tablet 40 milliGRAM(s) Oral before breakfast    PRN Inpatient Medications  acetaminophen     Tablet .. 650 milliGRAM(s) Oral every 6 hours PRN  dextrose Oral Gel 15 Gram(s) Oral once PRN      REVIEW OF SYSTEMS  --------------------------------------------------------------------------------  Gen: No fevers/chills   Head/Eyes/Ears/Mouth: No headache; Normal hearing;  No sinus pain/discomfort, sore throat  Respiratory: +SOB, +cough. No wheezing, hemoptysis  CV: No chest pain, orthopnea  GI: +loose BM x 1, No abdominal pain, nausea, vomiting,  : No dysuria, decrease urination   MSK: No joint pain/swelling; no back pain  Neuro: No dizziness/lightheadedness, weakness,   also with edema     VITALS/PHYSICAL EXAM  --------------------------------------------------------------------------------  T(C): 36.6 (10-01-22 @ 12:07), Max: 36.7 (09-30-22 @ 19:49)  HR: 98 (10-01-22 @ 12:07) (98 - 104)  BP: 108/74 (10-01-22 @ 12:07) (97/65 - 113/78)  RR: 12 (10-01-22 @ 12:07) (12 - 20)  SpO2: 98% (10-01-22 @ 12:07) (97% - 100%)  Wt(kg): --  Height (cm): 195.6 (09-30-22 @ 12:47)  Weight (kg): 119.7 (09-30-22 @ 12:47)  BMI (kg/m2): 31.3 (09-30-22 @ 12:47)  BSA (m2): 2.52 (09-30-22 @ 12:47)      09-30-22 @ 07:01  -  10-01-22 @ 07:00  --------------------------------------------------------  IN: 240 mL / OUT: 600 mL / NET: -360 mL    10-01-22 @ 07:01  -  10-01-22 @ 13:42  --------------------------------------------------------  IN: 360 mL / OUT: 250 mL / NET: 110 mL      Physical Exam:  	Gen: Non toxic comfortable appearing   	Pulm: decrease bs, +crackles, no ronchi or wheezing  	CV: +JVD. RRR, S1S2; no rub  	Back: No CVA tenderness; + sacral edema  	Abd: +BS, soft, nontender/+distended, +ascites  	: No suprapubic tenderness  	UE: Warm, no cyanosis  no clubbing,  no edema;  	LE: Warm, no cyanosis  no clubbing, B/L 3+ edema up to thigh  	Neuro: alert and oriented. speech coherent   	Skin: Warm, no decrease skin turgor, chronic venous stasis changes, tattoos     LABS/STUDIES  --------------------------------------------------------------------------------              9.6    5.99  >-----------<  187      [10-01-22 @ 06:04]              33.2     140  |  106  |  38  ----------------------------<  151      [10-01-22 @ 06:05]  3.2   |  22  |  1.53        Ca     8.8     [10-01-22 @ 06:05]      Mg     1.9     [10-01-22 @ 06:05]    TPro  7.1  /  Alb  3.5  /  TBili  0.6  /  DBili  x   /  AST  20  /  ALT  17  /  AlkPhos  118  [09-30-22 @ 13:45]    PT/INR: PT 16.1 , INR 1.38       [09-30-22 @ 13:45]  PTT: 31.6       [09-30-22 @ 13:45]          [09-30-22 @ 15:01]    Creatinine Trend:  SCr 1.53 [10-01 @ 06:05]  SCr 1.65 [09-30 @ 13:45]    Urinalysis - [07-01-22 @ 21:00]      Color Yellow / Appearance Clear / SG 1.017 / pH 6.0      Gluc Negative / Ketone Negative  / Bili Negative / Urobili Negative       Blood Trace / Protein 30 mg/dL / Leuk Est Negative / Nitrite Negative      RBC 3 / WBC 2 / Hyaline 14 / Gran  / Sq Epi  / Non Sq Epi 1 / Bacteria Negative      HbA1c 8.9      [12-16-19 @ 08:15]  TSH 3.02      [09-30-22 @ 13:45]    HCV 0.09, Nonreact      [12-16-19 @ 08:36]    < from: Xray Chest 1 View- PORTABLE-Urgent (Xray Chest 1 View- PORTABLE-Urgent .) (09.30.22 @ 14:18) >    ACC: [id number] EXAM:  XR CHEST PORTABLE URGENT 1V                          PROCEDURE DATE:  09/30/2022          INTERPRETATION:  CLINICAL INFORMATION: Shortness of breath.    EXAM: Chest X-ray, AP View    COMPARISON: Chest X-ray from 7/1/2022; CT chest 7/2/2022.    FINDINGS:    Blunting of the right costophrenic angle, may represent a small pleural   effusion. Lungs are otherwise clear.    Heart size cannot be accurately assessed on this projection. Left chest   wall SICD.    No acute osseous findings.      IMPRESSION:    Blunting of the right costophrenic angle, may represent a small pleural   effusion. Lungs are otherwise clear.    --- End of Report ---    < end of copied text >    < from: US Kidney and Bladder (07.02.22 @ 19:55) >  ACC: [id number] EXAM:  US KIDNEYS AND BLADDER                          PROCEDURE DATE:  07/02/2022          INTERPRETATION:  CLINICAL INFORMATION: Acute kidney injury.    COMPARISON: None available.    TECHNIQUE: Sonography of the kidneys and bladder.    FINDINGS:    Right kidney: 11.7 cm. Increased echogenicity with cortical thinning.   Moderate to severe hydronephrosis. Mid-upper pole cyst measures 2.1 cm.   Midpole cyst measures 1.1 cm.    Left kidney: 15.0 cm. No renal mass, hydronephrosis orcalculi.    Urinary bladder: Within normal limits. A left ureteral jet is seen    Other: Homogeneously hyperechoic 1 cm lesion in the right hepatic lobe,   likely hemangioma.    IMPRESSION:  *  Increased echogenicity of the right kidney with overlying cortical   thinning.  *  Moderate to severe right hydronephrosis.  *  Right renal cyst.        --- End of Report ---    < end of copied text >

## 2022-10-02 LAB
ANION GAP SERPL CALC-SCNC: 11 MMOL/L — SIGNIFICANT CHANGE UP (ref 5–17)
ANION GAP SERPL CALC-SCNC: 14 MMOL/L — SIGNIFICANT CHANGE UP (ref 5–17)
BLD GP AB SCN SERPL QL: NEGATIVE — SIGNIFICANT CHANGE UP
BUN SERPL-MCNC: 36 MG/DL — HIGH (ref 7–23)
BUN SERPL-MCNC: 37 MG/DL — HIGH (ref 7–23)
CALCIUM SERPL-MCNC: 8.9 MG/DL — SIGNIFICANT CHANGE UP (ref 8.4–10.5)
CALCIUM SERPL-MCNC: 9.1 MG/DL — SIGNIFICANT CHANGE UP (ref 8.4–10.5)
CALCIUM SERPL-MCNC: 9.4 MG/DL — SIGNIFICANT CHANGE UP (ref 8.4–10.5)
CHLORIDE SERPL-SCNC: 101 MMOL/L — SIGNIFICANT CHANGE UP (ref 96–108)
CHLORIDE SERPL-SCNC: 106 MMOL/L — SIGNIFICANT CHANGE UP (ref 96–108)
CO2 SERPL-SCNC: 20 MMOL/L — LOW (ref 22–31)
CO2 SERPL-SCNC: 24 MMOL/L — SIGNIFICANT CHANGE UP (ref 22–31)
CREAT SERPL-MCNC: 1.57 MG/DL — HIGH (ref 0.5–1.3)
CREAT SERPL-MCNC: 1.63 MG/DL — HIGH (ref 0.5–1.3)
EGFR: 48 ML/MIN/1.73M2 — LOW
EGFR: 50 ML/MIN/1.73M2 — LOW
FERRITIN SERPL-MCNC: 86 NG/ML — SIGNIFICANT CHANGE UP (ref 30–400)
FOLATE SERPL-MCNC: 7.6 NG/ML — SIGNIFICANT CHANGE UP
GLUCOSE BLDC GLUCOMTR-MCNC: 146 MG/DL — HIGH (ref 70–99)
GLUCOSE BLDC GLUCOMTR-MCNC: 147 MG/DL — HIGH (ref 70–99)
GLUCOSE BLDC GLUCOMTR-MCNC: 159 MG/DL — HIGH (ref 70–99)
GLUCOSE BLDC GLUCOMTR-MCNC: 187 MG/DL — HIGH (ref 70–99)
GLUCOSE SERPL-MCNC: 162 MG/DL — HIGH (ref 70–99)
GLUCOSE SERPL-MCNC: 175 MG/DL — HIGH (ref 70–99)
HCT VFR BLD CALC: 35.2 % — LOW (ref 39–50)
HGB BLD-MCNC: 10 G/DL — LOW (ref 13–17)
IRON SATN MFR SERPL: 33 UG/DL — LOW (ref 45–165)
IRON SATN MFR SERPL: 9 % — LOW (ref 16–55)
MAGNESIUM SERPL-MCNC: 2.1 MG/DL — SIGNIFICANT CHANGE UP (ref 1.6–2.6)
MCHC RBC-ENTMCNC: 22.8 PG — LOW (ref 27–34)
MCHC RBC-ENTMCNC: 28.4 GM/DL — LOW (ref 32–36)
MCV RBC AUTO: 80.2 FL — SIGNIFICANT CHANGE UP (ref 80–100)
NRBC # BLD: 0 /100 WBCS — SIGNIFICANT CHANGE UP (ref 0–0)
PLATELET # BLD AUTO: 185 K/UL — SIGNIFICANT CHANGE UP (ref 150–400)
POTASSIUM SERPL-MCNC: 3.8 MMOL/L — SIGNIFICANT CHANGE UP (ref 3.5–5.3)
POTASSIUM SERPL-MCNC: 4.5 MMOL/L — SIGNIFICANT CHANGE UP (ref 3.5–5.3)
POTASSIUM SERPL-SCNC: 3.8 MMOL/L — SIGNIFICANT CHANGE UP (ref 3.5–5.3)
POTASSIUM SERPL-SCNC: 4.5 MMOL/L — SIGNIFICANT CHANGE UP (ref 3.5–5.3)
PTH-INTACT FLD-MCNC: 73 PG/ML — HIGH (ref 15–65)
RBC # BLD: 4.39 M/UL — SIGNIFICANT CHANGE UP (ref 4.2–5.8)
RBC # BLD: 4.39 M/UL — SIGNIFICANT CHANGE UP (ref 4.2–5.8)
RBC # FLD: 20.4 % — HIGH (ref 10.3–14.5)
RETICS #: 112.8 K/UL — SIGNIFICANT CHANGE UP (ref 25–125)
RETICS/RBC NFR: 2.6 % — HIGH (ref 0.5–2.5)
RH IG SCN BLD-IMP: POSITIVE — SIGNIFICANT CHANGE UP
SODIUM SERPL-SCNC: 137 MMOL/L — SIGNIFICANT CHANGE UP (ref 135–145)
SODIUM SERPL-SCNC: 139 MMOL/L — SIGNIFICANT CHANGE UP (ref 135–145)
TIBC SERPL-MCNC: 390 UG/DL — SIGNIFICANT CHANGE UP (ref 220–430)
UIBC SERPL-MCNC: 357 UG/DL — SIGNIFICANT CHANGE UP (ref 110–370)
URATE SERPL-MCNC: 8.5 MG/DL — SIGNIFICANT CHANGE UP (ref 3.4–8.8)
VIT B12 SERPL-MCNC: 674 PG/ML — SIGNIFICANT CHANGE UP (ref 232–1245)
WBC # BLD: 5.75 K/UL — SIGNIFICANT CHANGE UP (ref 3.8–10.5)
WBC # FLD AUTO: 5.75 K/UL — SIGNIFICANT CHANGE UP (ref 3.8–10.5)

## 2022-10-02 RX ORDER — ALPRAZOLAM 0.25 MG
0.25 TABLET ORAL ONCE
Refills: 0 | Status: DISCONTINUED | OUTPATIENT
Start: 2022-10-02 | End: 2022-10-02

## 2022-10-02 RX ADMIN — Medication 40 MILLIEQUIVALENT(S): at 01:35

## 2022-10-02 RX ADMIN — Medication 2: at 12:30

## 2022-10-02 RX ADMIN — Medication 650 MILLIGRAM(S): at 06:00

## 2022-10-02 RX ADMIN — Medication 650 MILLIGRAM(S): at 14:41

## 2022-10-02 RX ADMIN — INSULIN GLARGINE 40 UNIT(S): 100 INJECTION, SOLUTION SUBCUTANEOUS at 22:37

## 2022-10-02 RX ADMIN — ATORVASTATIN CALCIUM 80 MILLIGRAM(S): 80 TABLET, FILM COATED ORAL at 21:42

## 2022-10-02 RX ADMIN — Medication 650 MILLIGRAM(S): at 21:42

## 2022-10-02 RX ADMIN — HEPARIN SODIUM 5000 UNIT(S): 5000 INJECTION INTRAVENOUS; SUBCUTANEOUS at 21:42

## 2022-10-02 RX ADMIN — SPIRONOLACTONE 25 MILLIGRAM(S): 25 TABLET, FILM COATED ORAL at 05:16

## 2022-10-02 RX ADMIN — BUMETANIDE 10 MG/HR: 0.25 INJECTION INTRAMUSCULAR; INTRAVENOUS at 18:40

## 2022-10-02 RX ADMIN — Medication 650 MILLIGRAM(S): at 15:30

## 2022-10-02 RX ADMIN — CLOPIDOGREL BISULFATE 75 MILLIGRAM(S): 75 TABLET, FILM COATED ORAL at 12:31

## 2022-10-02 RX ADMIN — Medication 0.25 MILLIGRAM(S): at 14:38

## 2022-10-02 RX ADMIN — Medication 50 MILLIGRAM(S): at 05:16

## 2022-10-02 RX ADMIN — Medication 2: at 08:40

## 2022-10-02 RX ADMIN — HEPARIN SODIUM 5000 UNIT(S): 5000 INJECTION INTRAVENOUS; SUBCUTANEOUS at 05:15

## 2022-10-02 RX ADMIN — BUMETANIDE 10 MG/HR: 0.25 INJECTION INTRAMUSCULAR; INTRAVENOUS at 08:30

## 2022-10-02 RX ADMIN — HEPARIN SODIUM 5000 UNIT(S): 5000 INJECTION INTRAVENOUS; SUBCUTANEOUS at 12:31

## 2022-10-02 RX ADMIN — Medication 650 MILLIGRAM(S): at 05:15

## 2022-10-02 RX ADMIN — Medication 650 MILLIGRAM(S): at 22:12

## 2022-10-02 RX ADMIN — Medication 81 MILLIGRAM(S): at 12:31

## 2022-10-02 RX ADMIN — PANTOPRAZOLE SODIUM 40 MILLIGRAM(S): 20 TABLET, DELAYED RELEASE ORAL at 05:16

## 2022-10-02 NOTE — PROGRESS NOTE ADULT - SUBJECTIVE AND OBJECTIVE BOX
Patient is a 61y old  Male who presents with a chief complaint of LE swelling (01 Oct 2022 13:42)      SUBJECTIVE / OVERNIGHT EVENTS: no events over night     T(C): 36.4 (10-02-22 @ 11:37), Max: 36.4 (10-02-22 @ 06:40)  HR: 100 (10-02-22 @ 11:37) (100 - 104)  BP: 98/63 (10-02-22 @ 11:37) (98/63 - 109/68)  RR: 18 (10-02-22 @ 11:37) (18 - 18)  SpO2: 99% (10-02-22 @ 11:37) (94% - 100%)      MEDICATIONS  (STANDING):  aspirin  chewable 81 milliGRAM(s) Oral daily  atorvastatin 80 milliGRAM(s) Oral at bedtime  buMETAnide Infusion 2 mG/Hr (10 mL/Hr) IV Continuous <Continuous>  clopidogrel Tablet 75 milliGRAM(s) Oral daily  dextrose 5%. 1000 milliLiter(s) (50 mL/Hr) IV Continuous <Continuous>  dextrose 5%. 1000 milliLiter(s) (100 mL/Hr) IV Continuous <Continuous>  dextrose 50% Injectable 25 Gram(s) IV Push once  dextrose 50% Injectable 12.5 Gram(s) IV Push once  dextrose 50% Injectable 25 Gram(s) IV Push once  glucagon  Injectable 1 milliGRAM(s) IntraMuscular once  heparin   Injectable 5000 Unit(s) SubCutaneous every 8 hours  insulin glargine Injectable (LANTUS) 40 Unit(s) SubCutaneous at bedtime  insulin lispro (ADMELOG) corrective regimen sliding scale   SubCutaneous three times a day before meals  metoprolol succinate ER 50 milliGRAM(s) Oral daily  pantoprazole    Tablet 40 milliGRAM(s) Oral before breakfast  spironolactone 25 milliGRAM(s) Oral daily    MEDICATIONS  (PRN):  acetaminophen     Tablet .. 650 milliGRAM(s) Oral every 6 hours PRN Mild Pain (1 - 3), Moderate Pain (4 - 6)  dextrose Oral Gel 15 Gram(s) Oral once PRN Blood Glucose LESS THAN 70 milliGRAM(s)/deciliter      PHYSICAL EXAM:  GENERAL: NAD, well-developed  HEAD:  Atraumatic, Normocephalic  EYES: EOMI,  conjunctiva and sclera clear  NECK: Supple, No JVD  CHEST/LUNG: Clear to auscultation bilaterally; No wheeze  HEART: Regular rate and rhythm; No murmurs, rubs, or gallops  ABDOMEN: Soft, Nontender, Nondistended; Bowel sounds present  EXTREMITIES:  2 +edema   PSYCH: AAOx3  NEUROLOGY: non-focal  SKIN: No rashes or lesions                                                     10.0   5.75  )-----------( 185      ( 02 Oct 2022 07:03 )             35.2               137|106|37<175  4.5|20|1.57  9.1,2.1,--  10-02 @ 07:03      CAPILLARY BLOOD GLUCOSE      POCT Blood Glucose.: 147 mg/dL (02 Oct 2022 17:16)  POCT Blood Glucose.: 159 mg/dL (02 Oct 2022 11:47)  POCT Blood Glucose.: 187 mg/dL (02 Oct 2022 08:13)  POCT Blood Glucose.: 221 mg/dL (01 Oct 2022 21:36)    RADIOLOGY & ADDITIONAL TESTS:    Imaging Personally Reviewed:    Consultant(s) Notes Reviewed:      Care Discussed with Consultants/Other Providers:

## 2022-10-02 NOTE — PROGRESS NOTE ADULT - ASSESSMENT
60 y/o M with h/o HTN, CAD s/p stents, HFrEF s/p AICD, T2DM on insulin, osteo of L toe s/p amputation presenting with worsening LE edema for 1 week c/w acute on chronic systolic HF exacerbation.

## 2022-10-02 NOTE — PROGRESS NOTE ADULT - ASSESSMENT
61 year old Male with PHSx HTN, CAD s/p stents, HFrEF s/p AICD, T2DM on insulin, OM of L toe s/p amputation, He presents to ED with worsening LE edema for 1 week.       1- CKD III  2- CHF  3- T2DM  4- anemia  5- hypokalemia      patient states baseline creatinine range 2.0-2.3 g/dL  renal sono reviewed from 7/2022 right moderate to severe chronic hydronephrosis   significantly fluid overloaded on exam  agree with bumex drip @ 2mg/hr  keep O> I   trend K+  add aldactone 25 mg daily  insulin sliding scale  check protein/creatinine ratio  anemia, trend hgb iron supplement   strict I/O  monitor daily standing weight  keep O>I

## 2022-10-02 NOTE — PROGRESS NOTE ADULT - SUBJECTIVE AND OBJECTIVE BOX
[facility]   [phone]  RENAL FOLLOW UP NOTE  --------------------------------------------------------------------------------  Chief Complaint:    24 hour events/subjective:    seen earlier   states is short of breath       PAST HISTORY  --------------------------------------------------------------------------------  No significant changes to PMH, PSH, FHx, SHx, unless otherwise noted    ALLERGIES & MEDICATIONS  --------------------------------------------------------------------------------  Allergies    No Known Allergies    Intolerances      Standing Inpatient Medications  aspirin  chewable 81 milliGRAM(s) Oral daily  atorvastatin 80 milliGRAM(s) Oral at bedtime  buMETAnide Infusion 2 mG/Hr IV Continuous <Continuous>  clopidogrel Tablet 75 milliGRAM(s) Oral daily  dextrose 5%. 1000 milliLiter(s) IV Continuous <Continuous>  dextrose 5%. 1000 milliLiter(s) IV Continuous <Continuous>  dextrose 50% Injectable 25 Gram(s) IV Push once  dextrose 50% Injectable 12.5 Gram(s) IV Push once  dextrose 50% Injectable 25 Gram(s) IV Push once  glucagon  Injectable 1 milliGRAM(s) IntraMuscular once  heparin   Injectable 5000 Unit(s) SubCutaneous every 8 hours  insulin glargine Injectable (LANTUS) 40 Unit(s) SubCutaneous at bedtime  insulin lispro (ADMELOG) corrective regimen sliding scale   SubCutaneous three times a day before meals  metoprolol succinate ER 50 milliGRAM(s) Oral daily  pantoprazole    Tablet 40 milliGRAM(s) Oral before breakfast  spironolactone 25 milliGRAM(s) Oral daily    PRN Inpatient Medications  acetaminophen     Tablet .. 650 milliGRAM(s) Oral every 6 hours PRN  dextrose Oral Gel 15 Gram(s) Oral once PRN      REVIEW OF SYSTEMS  --------------------------------------------------------------------------------    Gen: denies  fevers/chills,  CVS: denies chest pain/palpitations  Resp:  + SOB/Cough  GI: Denies N/V/Abd pain  : Denies dysuria/oliguria/hematuria      VITALS/PHYSICAL EXAM  --------------------------------------------------------------------------------  T(C): 36.4 (10-02-22 @ 11:37), Max: 36.7 (10-01-22 @ 20:16)  HR: 100 (10-02-22 @ 11:37) (100 - 104)  BP: 98/63 (10-02-22 @ 11:37) (98/63 - 109/69)  RR: 18 (10-02-22 @ 11:37) (17 - 18)  SpO2: 99% (10-02-22 @ 11:37) (94% - 100%)  Wt(kg): --        10-01-22 @ 07:01  -  10-02-22 @ 07:00  --------------------------------------------------------  IN: 1300 mL / OUT: 550 mL / NET: 750 mL    10-02-22 @ 07:01  -  10-02-22 @ 19:07  --------------------------------------------------------  IN: 600 mL / OUT: 2210 mL / NET: -1610 mL      Physical Exam:  	    Gen: Non toxic comfortable appearing   	Pulm: decrease bs, +crackles, no ronchi or wheezing  	CV: +JVD. RRR, S1S2; no rub  	Back: No CVA tenderness; + sacral edema  	Abd: +BS, soft, nontender/+distended, +ascites  	: No suprapubic tenderness  	UE: Warm, no cyanosis  no clubbing,  no edema;  	LE: Warm, no cyanosis  no clubbing, B/L 3+ edema up to thigh  	Neuro: alert and oriented. speech coherent   	Skin: Warm, no decrease skin turgor, chronic venous stasis changes, tattoos       LABS/STUDIES  --------------------------------------------------------------------------------              10.0   5.75  >-----------<  185      [10-02-22 @ 07:03]              35.2     139  |  101  |  36  ----------------------------<  162      [10-02-22 @ 18:15]  3.8   |  24  |  1.63        Ca     9.4     [10-02-22 @ 18:15]      Mg     2.1     [10-02-22 @ 07:03]          Uric acid 8.5      [10-02-22 @ 07:03]    Creatinine Trend:  SCr 1.63 [10-02 @ 18:15]  SCr 1.57 [10-02 @ 07:03]  SCr 1.55 [10-01 @ 14:24]  SCr 1.53 [10-01 @ 06:05]  SCr 1.65 [09-30 @ 13:45]                  Iron 33, TIBC 390, %sat 9      [10-02-22 @ 07:03]  Ferritin 86      [10-02-22 @ 07:04]  PTH -- (Ca 8.9)      [10-02-22 @ 07:04]   73  HbA1c 8.9      [12-16-19 @ 08:15]  TSH 3.02      [09-30-22 @ 13:45]

## 2022-10-02 NOTE — PROGRESS NOTE ADULT - PROBLEM SELECTOR PLAN 1
Worsening LE edema with mild R pleural effusion. Not hypoxic. Trigger unclear (possibly 2/2 to dietary indescretion)  -Bumex drip   Cards /EP

## 2022-10-03 ENCOUNTER — TRANSCRIPTION ENCOUNTER (OUTPATIENT)
Age: 62
End: 2022-10-03

## 2022-10-03 ENCOUNTER — APPOINTMENT (OUTPATIENT)
Dept: WOUND CARE | Facility: CLINIC | Age: 62
End: 2022-10-03

## 2022-10-03 DIAGNOSIS — I25.10 ATHEROSCLEROTIC HEART DISEASE OF NATIVE CORONARY ARTERY WITHOUT ANGINA PECTORIS: ICD-10-CM

## 2022-10-03 LAB
ANION GAP SERPL CALC-SCNC: 16 MMOL/L — SIGNIFICANT CHANGE UP (ref 5–17)
ANION GAP SERPL CALC-SCNC: 16 MMOL/L — SIGNIFICANT CHANGE UP (ref 5–17)
APPEARANCE UR: CLEAR — SIGNIFICANT CHANGE UP
BILIRUB UR-MCNC: NEGATIVE — SIGNIFICANT CHANGE UP
BUN SERPL-MCNC: 36 MG/DL — HIGH (ref 7–23)
BUN SERPL-MCNC: 37 MG/DL — HIGH (ref 7–23)
CALCIUM SERPL-MCNC: 9.2 MG/DL — SIGNIFICANT CHANGE UP (ref 8.4–10.5)
CALCIUM SERPL-MCNC: 9.4 MG/DL — SIGNIFICANT CHANGE UP (ref 8.4–10.5)
CHLORIDE SERPL-SCNC: 97 MMOL/L — SIGNIFICANT CHANGE UP (ref 96–108)
CHLORIDE SERPL-SCNC: 98 MMOL/L — SIGNIFICANT CHANGE UP (ref 96–108)
CO2 SERPL-SCNC: 24 MMOL/L — SIGNIFICANT CHANGE UP (ref 22–31)
CO2 SERPL-SCNC: 26 MMOL/L — SIGNIFICANT CHANGE UP (ref 22–31)
COLOR SPEC: COLORLESS — SIGNIFICANT CHANGE UP
CREAT ?TM UR-MCNC: 30 MG/DL — SIGNIFICANT CHANGE UP
CREAT SERPL-MCNC: 1.55 MG/DL — HIGH (ref 0.5–1.3)
CREAT SERPL-MCNC: 1.7 MG/DL — HIGH (ref 0.5–1.3)
DIFF PNL FLD: NEGATIVE — SIGNIFICANT CHANGE UP
EGFR: 46 ML/MIN/1.73M2 — LOW
EGFR: 51 ML/MIN/1.73M2 — LOW
GLUCOSE BLDC GLUCOMTR-MCNC: 129 MG/DL — HIGH (ref 70–99)
GLUCOSE BLDC GLUCOMTR-MCNC: 160 MG/DL — HIGH (ref 70–99)
GLUCOSE BLDC GLUCOMTR-MCNC: 177 MG/DL — HIGH (ref 70–99)
GLUCOSE BLDC GLUCOMTR-MCNC: 222 MG/DL — HIGH (ref 70–99)
GLUCOSE SERPL-MCNC: 128 MG/DL — HIGH (ref 70–99)
GLUCOSE SERPL-MCNC: 153 MG/DL — HIGH (ref 70–99)
GLUCOSE UR QL: NEGATIVE — SIGNIFICANT CHANGE UP
HCT VFR BLD CALC: 35.9 % — LOW (ref 39–50)
HGB BLD-MCNC: 10.4 G/DL — LOW (ref 13–17)
KETONES UR-MCNC: NEGATIVE — SIGNIFICANT CHANGE UP
LEUKOCYTE ESTERASE UR-ACNC: NEGATIVE — SIGNIFICANT CHANGE UP
MAGNESIUM SERPL-MCNC: 1.8 MG/DL — SIGNIFICANT CHANGE UP (ref 1.6–2.6)
MCHC RBC-ENTMCNC: 23.1 PG — LOW (ref 27–34)
MCHC RBC-ENTMCNC: 29 GM/DL — LOW (ref 32–36)
MCV RBC AUTO: 79.8 FL — LOW (ref 80–100)
NITRITE UR-MCNC: NEGATIVE — SIGNIFICANT CHANGE UP
NRBC # BLD: 0 /100 WBCS — SIGNIFICANT CHANGE UP (ref 0–0)
PH UR: 6 — SIGNIFICANT CHANGE UP (ref 5–8)
PLATELET # BLD AUTO: 218 K/UL — SIGNIFICANT CHANGE UP (ref 150–400)
POTASSIUM SERPL-MCNC: 3.5 MMOL/L — SIGNIFICANT CHANGE UP (ref 3.5–5.3)
POTASSIUM SERPL-MCNC: 3.6 MMOL/L — SIGNIFICANT CHANGE UP (ref 3.5–5.3)
POTASSIUM SERPL-SCNC: 3.5 MMOL/L — SIGNIFICANT CHANGE UP (ref 3.5–5.3)
POTASSIUM SERPL-SCNC: 3.6 MMOL/L — SIGNIFICANT CHANGE UP (ref 3.5–5.3)
PROT ?TM UR-MCNC: 13 MG/DL — HIGH (ref 0–12)
PROT UR-MCNC: SIGNIFICANT CHANGE UP
PROT/CREAT UR-RTO: 0.4 RATIO — HIGH (ref 0–0.2)
RBC # BLD: 4.5 M/UL — SIGNIFICANT CHANGE UP (ref 4.2–5.8)
RBC # FLD: 20 % — HIGH (ref 10.3–14.5)
SODIUM SERPL-SCNC: 138 MMOL/L — SIGNIFICANT CHANGE UP (ref 135–145)
SODIUM SERPL-SCNC: 139 MMOL/L — SIGNIFICANT CHANGE UP (ref 135–145)
SP GR SPEC: 1.01 — LOW (ref 1.01–1.02)
UROBILINOGEN FLD QL: NEGATIVE — SIGNIFICANT CHANGE UP
WBC # BLD: 7.71 K/UL — SIGNIFICANT CHANGE UP (ref 3.8–10.5)
WBC # FLD AUTO: 7.71 K/UL — SIGNIFICANT CHANGE UP (ref 3.8–10.5)

## 2022-10-03 PROCEDURE — 99232 SBSQ HOSP IP/OBS MODERATE 35: CPT

## 2022-10-03 PROCEDURE — 93306 TTE W/DOPPLER COMPLETE: CPT | Mod: 26

## 2022-10-03 RX ORDER — BUMETANIDE 0.25 MG/ML
0.5 INJECTION INTRAMUSCULAR; INTRAVENOUS
Qty: 20 | Refills: 0 | Status: DISCONTINUED | OUTPATIENT
Start: 2022-10-03 | End: 2022-10-03

## 2022-10-03 RX ORDER — ALBUTEROL 90 UG/1
2 AEROSOL, METERED ORAL
Qty: 0 | Refills: 0 | DISCHARGE

## 2022-10-03 RX ORDER — OXYCODONE AND ACETAMINOPHEN 5; 325 MG/1; MG/1
1 TABLET ORAL ONCE
Refills: 0 | Status: DISCONTINUED | OUTPATIENT
Start: 2022-10-03 | End: 2022-10-03

## 2022-10-03 RX ORDER — MUPIROCIN 20 MG/G
1 OINTMENT TOPICAL
Refills: 0 | Status: DISCONTINUED | OUTPATIENT
Start: 2022-10-03 | End: 2022-10-07

## 2022-10-03 RX ORDER — BUMETANIDE 0.25 MG/ML
1 INJECTION INTRAMUSCULAR; INTRAVENOUS
Qty: 20 | Refills: 0 | Status: DISCONTINUED | OUTPATIENT
Start: 2022-10-03 | End: 2022-10-03

## 2022-10-03 RX ADMIN — BUMETANIDE 5 MG/HR: 0.25 INJECTION INTRAMUSCULAR; INTRAVENOUS at 17:00

## 2022-10-03 RX ADMIN — HEPARIN SODIUM 5000 UNIT(S): 5000 INJECTION INTRAVENOUS; SUBCUTANEOUS at 17:00

## 2022-10-03 RX ADMIN — OXYCODONE AND ACETAMINOPHEN 1 TABLET(S): 5; 325 TABLET ORAL at 02:39

## 2022-10-03 RX ADMIN — INSULIN GLARGINE 40 UNIT(S): 100 INJECTION, SOLUTION SUBCUTANEOUS at 22:25

## 2022-10-03 RX ADMIN — OXYCODONE AND ACETAMINOPHEN 1 TABLET(S): 5; 325 TABLET ORAL at 02:09

## 2022-10-03 RX ADMIN — BUMETANIDE 2.5 MG/HR: 0.25 INJECTION INTRAMUSCULAR; INTRAVENOUS at 11:46

## 2022-10-03 RX ADMIN — BUMETANIDE 2.5 MG/HR: 0.25 INJECTION INTRAMUSCULAR; INTRAVENOUS at 20:00

## 2022-10-03 RX ADMIN — PANTOPRAZOLE SODIUM 40 MILLIGRAM(S): 20 TABLET, DELAYED RELEASE ORAL at 05:37

## 2022-10-03 RX ADMIN — ATORVASTATIN CALCIUM 80 MILLIGRAM(S): 80 TABLET, FILM COATED ORAL at 21:22

## 2022-10-03 RX ADMIN — Medication 81 MILLIGRAM(S): at 11:48

## 2022-10-03 RX ADMIN — Medication 2: at 18:12

## 2022-10-03 RX ADMIN — OXYCODONE AND ACETAMINOPHEN 1 TABLET(S): 5; 325 TABLET ORAL at 02:52

## 2022-10-03 RX ADMIN — HEPARIN SODIUM 5000 UNIT(S): 5000 INJECTION INTRAVENOUS; SUBCUTANEOUS at 05:37

## 2022-10-03 RX ADMIN — OXYCODONE AND ACETAMINOPHEN 1 TABLET(S): 5; 325 TABLET ORAL at 21:22

## 2022-10-03 RX ADMIN — BUMETANIDE 10 MG/HR: 0.25 INJECTION INTRAMUSCULAR; INTRAVENOUS at 02:47

## 2022-10-03 RX ADMIN — HEPARIN SODIUM 5000 UNIT(S): 5000 INJECTION INTRAVENOUS; SUBCUTANEOUS at 21:23

## 2022-10-03 RX ADMIN — CLOPIDOGREL BISULFATE 75 MILLIGRAM(S): 75 TABLET, FILM COATED ORAL at 11:48

## 2022-10-03 RX ADMIN — Medication 50 MILLIGRAM(S): at 05:36

## 2022-10-03 RX ADMIN — SPIRONOLACTONE 25 MILLIGRAM(S): 25 TABLET, FILM COATED ORAL at 05:36

## 2022-10-03 RX ADMIN — Medication 2: at 11:46

## 2022-10-03 NOTE — CONSULT NOTE ADULT - ASSESSMENT
62yo M admitted to b/l LE swelling with R foot sub mt 3 wound to dermis   - pt seen and evaluated  - afebrile, no leukocytosis  - L foot with healed L foot partial 3rd ray resection with no clinical signs of infection   R foot with sub metatarsal 3 wound to dermis with no clinical signs of infection. RLE with dependant rubor.   - No clinical signs of infection from the b/l feet, RLE has dependent rubor which resolves with elevation   - R foot sub mt 3 care with mupirocin and band-aid  - Podiatry plan is local wound care while in house   - Pt is stable for d/c from podiatry   - D/c instruction in discharge note provider follow up section   - Discussed w/ attending

## 2022-10-03 NOTE — PROGRESS NOTE ADULT - SUBJECTIVE AND OBJECTIVE BOX
[facility]   [phone]  RENAL FOLLOW UP NOTE  --------------------------------------------------------------------------------  Chief Complaint:    24 hour events/subjective:    patient seen and examined with Dr. Cain  states breathing is better    PAST HISTORY  --------------------------------------------------------------------------------  No significant changes to PMH, PSH, FHx, SHx, unless otherwise noted    ALLERGIES & MEDICATIONS  --------------------------------------------------------------------------------  Allergies    No Known Allergies    Intolerances      Standing Inpatient Medications  aspirin  chewable 81 milliGRAM(s) Oral daily  atorvastatin 80 milliGRAM(s) Oral at bedtime  buMETAnide Infusion 0.5 mG/Hr IV Continuous <Continuous>  clopidogrel Tablet 75 milliGRAM(s) Oral daily  dextrose 5%. 1000 milliLiter(s) IV Continuous <Continuous>  dextrose 5%. 1000 milliLiter(s) IV Continuous <Continuous>  dextrose 50% Injectable 25 Gram(s) IV Push once  dextrose 50% Injectable 12.5 Gram(s) IV Push once  dextrose 50% Injectable 25 Gram(s) IV Push once  glucagon  Injectable 1 milliGRAM(s) IntraMuscular once  heparin   Injectable 5000 Unit(s) SubCutaneous every 8 hours  insulin glargine Injectable (LANTUS) 40 Unit(s) SubCutaneous at bedtime  insulin lispro (ADMELOG) corrective regimen sliding scale   SubCutaneous three times a day before meals  metoprolol succinate ER 50 milliGRAM(s) Oral daily  pantoprazole    Tablet 40 milliGRAM(s) Oral before breakfast  spironolactone 25 milliGRAM(s) Oral daily    PRN Inpatient Medications  acetaminophen     Tablet .. 650 milliGRAM(s) Oral every 6 hours PRN  dextrose Oral Gel 15 Gram(s) Oral once PRN      REVIEW OF SYSTEMS  --------------------------------------------------------------------------------    Gen: denies fevers/chills,  CVS: denies chest pain/palpitations  Resp: denies SOB/Cough  GI: Denies N/V/Abd pain  : Denies dysuria/oliguria/hematuria    VITALS/PHYSICAL EXAM  --------------------------------------------------------------------------------  T(C): 36.4 (10-03-22 @ 12:16), Max: 36.6 (10-02-22 @ 20:35)  HR: 102 (10-03-22 @ 12:16) (102 - 106)  BP: 106/70 (10-03-22 @ 12:16) (106/70 - 125/78)  RR: 17 (10-03-22 @ 12:16) (17 - 18)  SpO2: 96% (10-03-22 @ 12:16) (96% - 100%)  Wt(kg): --        10-02-22 @ 07:01  -  10-03-22 @ 07:00  --------------------------------------------------------  IN: 960 mL / OUT: 8860 mL / NET: -7900 mL    10-03-22 @ 07:01  -  10-03-22 @ 13:50  --------------------------------------------------------  IN: 480 mL / OUT: 0 mL / NET: 480 mL      Physical Exam:  	              Gen: Appears comfortable   	Pulm: decrease bs, no rales, ronchi or wheezing  	CV: +Mild JVD. RRR, S1S2; no rub  	Abd: +BS, soft, nontender/+distended, +ascites, improving  	: No suprapubic tenderness  	UE: Warm, no cyanosis  no clubbing,  no edema;  	LE: Warm, no cyanosis  no clubbing, B/L 3+ edema up to thigh improving  	Skin: Warm, no decrease skin turgor, chronic venous stasis changes, tattoos     LABS/STUDIES  --------------------------------------------------------------------------------              10.4   7.71  >-----------<  218      [10-03-22 @ 06:30]              35.9     138  |  98  |  36  ----------------------------<  128      [10-03-22 @ 06:28]  3.6   |  24  |  1.70        Ca     9.2     [10-03-22 @ 06:28]      Mg     2.1     [10-02-22 @ 07:03]          Uric acid 8.5      [10-02-22 @ 07:03]    Creatinine Trend:  SCr 1.70 [10-03 @ 06:28]  SCr 1.63 [10-02 @ 18:15]  SCr 1.57 [10-02 @ 07:03]  SCr 1.55 [10-01 @ 14:24]  SCr 1.53 [10-01 @ 06:05]              Iron 33, TIBC 390, %sat 9      [10-02-22 @ 07:03]  Ferritin 86      [10-02-22 @ 07:04]  PTH -- (Ca 8.9)      [10-02-22 @ 07:04]   73  HbA1c 8.9      [12-16-19 @ 08:15]  TSH 3.02      [09-30-22 @ 13:45]

## 2022-10-03 NOTE — PROGRESS NOTE ADULT - PROBLEM SELECTOR PLAN 1
Worsening LE edema with mild R pleural effusion. Not hypoxic. Trigger unclear (possibly 2/2 to dietary indescretion)  D/C Bumex as per renal   Cards following

## 2022-10-03 NOTE — CONSULT NOTE ADULT - PROBLEM SELECTOR PROBLEM 4
[facility] Transitions Initial Follow Up Call    Outreach made within 2 business days of discharge: Yes    Patient: Melissa Roberto Patient : 1993   MRN: [id number]  Reason for Admission: There are no discharge diagnoses documented for the most recent discharge. Discharge Date: 10/2/22       Spoke with: meño @ 10:54am    Discharge department/facility: [facility]     TCM Interactive Patient Contact:  Was patient able to fill all prescriptions:   Was patient instructed to bring all medications to the follow-up visit:   Is patient taking all medications as directed in the discharge summary?    Does patient understand their discharge instructions:   Does patient have questions or concerns that need addressed prior to 7-14 day follow up office visit:     Scheduled appointment with PCP within 7-14 days    Follow Up  Future Appointments   Date Time Provider Марина Childress   10/3/2022  1:00 PM [facility] TIFF OB/GYN ULTRASOUND TIFF OB/GYN [facility]   10/3/2022  2:10 PM FLOR Wen CNM TIFF OB/GYN [facility]       Yumiko Cummins MA
HTN (hypertension)
Leukocytosis

## 2022-10-03 NOTE — DISCHARGE NOTE PROVIDER - NSDCCPCAREPLAN_GEN_ALL_CORE_FT
PRINCIPAL DISCHARGE DIAGNOSIS  Diagnosis: Acute exacerbation of congestive heart failure  Assessment and Plan of Treatment: Weigh yourself daily.  If you gain 3lbs in 3 days, or 5lbs in a week call your Health Care Provider.  Do not eat or drink foods containing more than 2000mg of salt (sodium) in your diet every day.  Call your Health Care Provider if you have any swelling or increased swelling in your feet, ankles, and/or stomach.  Take all of your medication as directed.  If you become dizzy call your Health Care Provider.        SECONDARY DISCHARGE DIAGNOSES  Diagnosis: Diabetes  Assessment and Plan of Treatment:   HgA1C this admission -  Make sure you get your HgA1c checked every three months.  If you take oral diabetes medications, check your blood glucose two times a day.  If you take insulin, check your blood glucose before meals and at bedtime.  It's important not to skip any meals.  Keep a log of your blood glucose results and always take it with you to your doctor appointments.  Keep a list of your current medications including injectables and over the counter medications and bring this medication list with you to all your doctor appointments.  If you have not seen your ophthalmologist this year call for appointment.  Check your feet daily for redness, sores, or openings. Do not self treat. If no improvement in two days call your primary care physician for an appointment.  Low blood sugar (hypoglycemia) is a blood sugar below 70mg/dl. Check your blood sugar if you feel signs/symptoms of hypoglycemia. If your blood sugar is below 70 take 15 grams of carbohydrates (ex 4 oz of apple juice, 3-4 glucose tablets, or 4-6 oz of regular soda) wait 15 minutes and repeat blood sugar to make sure it comes up above 70.  If your blood sugar is above 70 and you are due for a meal, have a meal.  If you are not due for a meal have a snack.  This snack helps keeps your blood sugar at a safe range.      Diagnosis: Hypertension  Assessment and Plan of Treatment:      PRINCIPAL DISCHARGE DIAGNOSIS  Diagnosis: Acute exacerbation of congestive heart failure  Assessment and Plan of Treatment: Weigh yourself daily.  If you gain 3lbs in 3 days, or 5lbs in a week call your Health Care Provider.  Do not eat or drink foods containing more than 2000mg of salt (sodium) in your diet every day.  Call your Health Care Provider if you have any swelling or increased swelling in your feet, ankles, and/or stomach.  Take all of your medication as directed.  If you become dizzy call your Health Care Provider.        SECONDARY DISCHARGE DIAGNOSES  Diagnosis: Diabetes  Assessment and Plan of Treatment:   HgA1C this admission -  Make sure you get your HgA1c checked every three months.  If you take oral diabetes medications, check your blood glucose two times a day.  If you take insulin, check your blood glucose before meals and at bedtime.  It's important not to skip any meals.  Keep a log of your blood glucose results and always take it with you to your doctor appointments.  Keep a list of your current medications including injectables and over the counter medications and bring this medication list with you to all your doctor appointments.  If you have not seen your ophthalmologist this year call for appointment.  Check your feet daily for redness, sores, or openings. Do not self treat. If no improvement in two days call your primary care physician for an appointment.  Low blood sugar (hypoglycemia) is a blood sugar below 70mg/dl. Check your blood sugar if you feel signs/symptoms of hypoglycemia. If your blood sugar is below 70 take 15 grams of carbohydrates (ex 4 oz of apple juice, 3-4 glucose tablets, or 4-6 oz of regular soda) wait 15 minutes and repeat blood sugar to make sure it comes up above 70.  If your blood sugar is above 70 and you are due for a meal, have a meal.  If you are not due for a meal have a snack.  This snack helps keeps your blood sugar at a safe range.      Diagnosis: Hypertension  Assessment and Plan of Treatment: Continue taking blood pressure medications and follow up with cardiologist Dr. Camara.

## 2022-10-03 NOTE — PROVIDER CONTACT NOTE (OTHER) - ACTION/TREATMENT ORDERED:
Administer percocet 5mg/325mg, continue to monitor. Ordered percocet 5mg/325mg x1. continue to monitor.

## 2022-10-03 NOTE — CHART NOTE - NSCHARTNOTEFT_GEN_A_CORE
Called by Rn that  pt  complain  of  increased  urge  to  urinate  even though  he  had  just  emptied  his  bladder  and  pain  occurs  when  he attempts  to  go.   Pt  was  bladder  scan  and  found  to  have  less  than  150  ml  of  urine  in  his  bladder.  Pt  was  seen  an  evaluated,  pt  states  the  pain  onset  this  afternoon  after  the  Bumex  was   increased  in  dose.    Pt  bladder  is  non distended  and  non  tender  to  palpitation  .   Patient is a 61y old  Male who presents with a chief complaint of LE swelling (03 Oct 2022 15:44)      Vital Signs Last 24 Hrs  T(C): 36.9 (03 Oct 2022 19:51), Max: 36.9 (03 Oct 2022 19:51)  T(F): 98.4 (03 Oct 2022 19:51), Max: 98.4 (03 Oct 2022 19:51)  HR: 107 (03 Oct 2022 19:51) (102 - 107)  BP: 111/67 (03 Oct 2022 19:51) (106/70 - 125/78)  BP(mean): --  RR: 18 (03 Oct 2022 19:51) (17 - 18)  SpO2: 94% (03 Oct 2022 19:51) (94% - 96%)    Parameters below as of 03 Oct 2022 19:51  Patient On (Oxygen Delivery Method): room air          Labs:                          10.4   7.71  )-----------( 218      ( 03 Oct 2022 06:30 )             35.9     10-03    139  |  97  |  37<H>  ----------------------------<  153<H>  3.5   |  26  |  1.55<H>    Ca    9.4      03 Oct 2022 18:44  Mg     1.8     10-03        Constitutional: No fever, fatigue or weight loss.  Skin: No rash.  Eyes: No recent vision problems or eye pain.  ENT: No congestion, ear pain, or sore throat.  Endocrine: No thyroid problems.  Cardiovascular: No chest pain or palpation.  Respiratory: No cough, shortness of breath, congestion, or wheezing.  Gastrointestinal:  Pain  with  urge  to  urinate    Genitourinary: No dysuria.  Musculoskeletal: No joint swelling.  Neurologic: No headache.      Physical Exam:  General: WN/WD NAD  Neurology: A&Ox3, nonfocal, ELKINS x 4  Head:  Normocephalic, atraumatic  Respiratory: CTA B/L  CV: RRR, S1S2, no murmur  Abdominal: Soft, NT, ND no palpable mass  MSK: No edema, + peripheral pulses, FROM all 4 extremity    Assessment & Plan:  HPI:    HPI: 60 y/o M with h/o HTN, CAD s/p stents, HFrEF s/p AICD, T2DM on insulin, osteo of L toe s/p amputation presenting with worsening LE edema for 1 week. Over the past week, patient has noticed worsening b/l LE swelling and tightness as well as decreased ET. Denies sick contacts/changes in diet/decrease in medications. Has been following up with outpatient cardiology who over this time period has been uptitrating outpatient lasix, last to po 80 and 40. Given pain and swelling continued presented to ED. of note had outpatient bilat dupplex neg for DVt. Denies cough/cp/palpitations/ab pain/dizziness/lightheadedness.  Now  with pain  with  the  urge  to  urinate      I  >Discussed   findings  with  Attending  Dr Blandon    >Decreased  Bumex  to  2.5 ml/hr     >  Continue  to  monitor   urine  out  put  >percocet  1  tabx1  dose   >  UA  ordered    >  HF  team  Following

## 2022-10-03 NOTE — PROGRESS NOTE ADULT - SUBJECTIVE AND OBJECTIVE BOX
Subjective: feels well, no shortness of breath. eager to go home     Medications:  acetaminophen     Tablet .. 650 milliGRAM(s) Oral every 6 hours PRN  aspirin  chewable 81 milliGRAM(s) Oral daily  atorvastatin 80 milliGRAM(s) Oral at bedtime  buMETAnide Infusion 0.5 mG/Hr IV Continuous <Continuous>  clopidogrel Tablet 75 milliGRAM(s) Oral daily  dextrose 5%. 1000 milliLiter(s) IV Continuous <Continuous>  dextrose 5%. 1000 milliLiter(s) IV Continuous <Continuous>  dextrose 50% Injectable 25 Gram(s) IV Push once  dextrose 50% Injectable 12.5 Gram(s) IV Push once  dextrose 50% Injectable 25 Gram(s) IV Push once  dextrose Oral Gel 15 Gram(s) Oral once PRN  glucagon  Injectable 1 milliGRAM(s) IntraMuscular once  heparin   Injectable 5000 Unit(s) SubCutaneous every 8 hours  insulin glargine Injectable (LANTUS) 40 Unit(s) SubCutaneous at bedtime  insulin lispro (ADMELOG) corrective regimen sliding scale   SubCutaneous three times a day before meals  metoprolol succinate ER 50 milliGRAM(s) Oral daily  pantoprazole    Tablet 40 milliGRAM(s) Oral before breakfast  spironolactone 25 milliGRAM(s) Oral daily      Vitals:  Vital Signs Last 24 Hours  T(C): 36.4 (10-03-22 @ 12:16), Max: 36.6 (10-02-22 @ 20:35)  HR: 102 (10-03-22 @ 12:16) (102 - 106)  BP: 106/70 (10-03-22 @ 12:16) (106/70 - 125/78)  RR: 17 (10-03-22 @ 12:16) (17 - 18)  SpO2: 96% (10-03-22 @ 12:16) (96% - 100%)    Weight in k (10-03 @ 06:40)    I&O's Summary    02 Oct 2022 07:01  -  03 Oct 2022 07:00  --------------------------------------------------------  IN: 960 mL / OUT: 8860 mL / NET: -7900 mL    03 Oct 2022 07:01  -  03 Oct 2022 15:32  --------------------------------------------------------  IN: 480 mL / OUT: 1100 mL / NET: -620 mL        Physical Exam  General: No distress. Comfortable.  HEENT: EOM intact.  Neck: Neck supple. JVP not elevated. No masses  Chest: Clear to auscultation bilaterally  CV: Normal S1 and S2. No murmurs, rub, or gallops. Radial pulses normal.  Abdomen: Soft, non-distended, non-tender  Skin: No rashes or skin breakdown  Neurology: Alert and oriented times three. Sensation intact  Psych: Affect normal    LVAD Interrogation  VAD TYPE  Speed  Flow  Power  PI   HVAD wave form description  Assessment of driveline exit site  Programming changes:     Labs:                        10.4   7.71  )-----------( 218      ( 03 Oct 2022 06:30 )             35.9     10    138  |  98  |  36<H>  ----------------------------<  128<H>  3.6   |  24  |  1.70<H>    Ca    9.2      03 Oct 2022 06:28  Mg     2.1     10-02            Serum Pro-Brain Natriuretic Peptide: 2777 pg/mL ( @ 13:45)              Imaging Studies  Subjective: feels well, no shortness of breath. eager to go home     Medications:  acetaminophen     Tablet .. 650 milliGRAM(s) Oral every 6 hours PRN  aspirin  chewable 81 milliGRAM(s) Oral daily  atorvastatin 80 milliGRAM(s) Oral at bedtime  buMETAnide Infusion 0.5 mG/Hr IV Continuous <Continuous>  clopidogrel Tablet 75 milliGRAM(s) Oral daily  dextrose 5%. 1000 milliLiter(s) IV Continuous <Continuous>  dextrose 5%. 1000 milliLiter(s) IV Continuous <Continuous>  dextrose 50% Injectable 25 Gram(s) IV Push once  dextrose 50% Injectable 12.5 Gram(s) IV Push once  dextrose 50% Injectable 25 Gram(s) IV Push once  dextrose Oral Gel 15 Gram(s) Oral once PRN  glucagon  Injectable 1 milliGRAM(s) IntraMuscular once  heparin   Injectable 5000 Unit(s) SubCutaneous every 8 hours  insulin glargine Injectable (LANTUS) 40 Unit(s) SubCutaneous at bedtime  insulin lispro (ADMELOG) corrective regimen sliding scale   SubCutaneous three times a day before meals  metoprolol succinate ER 50 milliGRAM(s) Oral daily  pantoprazole    Tablet 40 milliGRAM(s) Oral before breakfast  spironolactone 25 milliGRAM(s) Oral daily      Vitals:  Vital Signs Last 24 Hours  T(C): 36.4 (10-03-22 @ 12:16), Max: 36.6 (10-02-22 @ 20:35)  HR: 102 (10-03-22 @ 12:16) (102 - 106)  BP: 106/70 (10-03-22 @ 12:16) (106/70 - 125/78)  RR: 17 (10-03-22 @ 12:16) (17 - 18)  SpO2: 96% (10-03-22 @ 12:16) (96% - 100%)    Weight in k (10-03 @ 06:40)    I&O's Summary    02 Oct 2022 07:01  -  03 Oct 2022 07:00  --------------------------------------------------------  IN: 960 mL / OUT: 8860 mL / NET: -7900 mL    03 Oct 2022 07:01  -  03 Oct 2022 15:32  --------------------------------------------------------  IN: 480 mL / OUT: 1100 mL / NET: -620 mL        Physical Exam  General: No distress. Comfortable.  HEENT: EOM intact.  Neck: JVP > 18 cm  Chest: crackles throughout b/l  CV: Normal S1 and S2. No murmurs, rub, or gallops. Radial pulses normal.  Abdomen: Soft, distended, non-tender  Extremities: 3+ pitting edema above knees  Neurology: Alert and oriented times three.      Labs:                        10.4   7.71  )-----------( 218      ( 03 Oct 2022 06:30 )             35.9     10-03    138  |  98  |  36<H>  ----------------------------<  128<H>  3.6   |  24  |  1.70<H>    Ca    9.2      03 Oct 2022 06:28  Mg     2.1     10-02            Serum Pro-Brain Natriuretic Peptide: 2777 pg/mL ( @ 13:45)

## 2022-10-03 NOTE — CONSULT NOTE ADULT - SUBJECTIVE AND OBJECTIVE BOX
Podiatry pager #: [phone]/ [phone]    Patient is a 61y old  Male who presents with a chief complaint of LE swelling (03 Oct 2022 15:32)      HPI:  60 y/o M presenting with LE swelling for 1 week    HPI: 60 y/o M with h/o HTN, CAD s/p stents, HFrEF s/p AICD, T2DM on insulin, osteo of L toe s/p amputation presenting with worsening LE edema for 1 week. Over the past week, patient has noticed worsening b/l LE swelling and tightness as well as decreased ET. Denies sick contacts/changes in diet/decrease in medications. Has been following up with outpatient cardiology who over this time period has been uptitrating outpatient lasix, last to po 80 and 40. Given pain and swelling continued presented to ED. of note had outpatient bilat dupplex neg for DVt. Denies cough/cp/palpitations/ab pain/dizziness/lightheadedness.     In ED afeb hds. Labs notable for CBC wnl, trop 60s *2, BUN/Cr 42/1.65 (1.41 on previous admission), proBNP 2777 (1K on previous admission). EKG Left axis deviation with signs of previous infarct (unchanged). CXR with mild R sided pleural effusion. Received 80 IV lasix *1.  (30 Sep 2022 17:08)      PAST MEDICAL & SURGICAL HISTORY:  Stented coronary artery      Diabetes      AICD (automatic cardioverter/defibrillator) present      Hypertension      Heart failure with reduced ejection fraction      History of ischemic cardiomyopathy      History of COPD      H/O gastroesophageal reflux (GERD)      H/O vasectomy  20 yrs ago (2000)          MEDICATIONS  (STANDING):  aspirin  chewable 81 milliGRAM(s) Oral daily  atorvastatin 80 milliGRAM(s) Oral at bedtime  buMETAnide Infusion 0.5 mG/Hr (2.5 mL/Hr) IV Continuous <Continuous>  clopidogrel Tablet 75 milliGRAM(s) Oral daily  dextrose 5%. 1000 milliLiter(s) (50 mL/Hr) IV Continuous <Continuous>  dextrose 5%. 1000 milliLiter(s) (100 mL/Hr) IV Continuous <Continuous>  dextrose 50% Injectable 25 Gram(s) IV Push once  dextrose 50% Injectable 12.5 Gram(s) IV Push once  dextrose 50% Injectable 25 Gram(s) IV Push once  glucagon  Injectable 1 milliGRAM(s) IntraMuscular once  heparin   Injectable 5000 Unit(s) SubCutaneous every 8 hours  insulin glargine Injectable (LANTUS) 40 Unit(s) SubCutaneous at bedtime  insulin lispro (ADMELOG) corrective regimen sliding scale   SubCutaneous three times a day before meals  metoprolol succinate ER 50 milliGRAM(s) Oral daily  pantoprazole    Tablet 40 milliGRAM(s) Oral before breakfast  spironolactone 25 milliGRAM(s) Oral daily    MEDICATIONS  (PRN):  acetaminophen     Tablet .. 650 milliGRAM(s) Oral every 6 hours PRN Mild Pain (1 - 3), Moderate Pain (4 - 6)  dextrose Oral Gel 15 Gram(s) Oral once PRN Blood Glucose LESS THAN 70 milliGRAM(s)/deciliter      Allergies    No Known Allergies    Intolerances        VITALS:    Vital Signs Last 24 Hrs  T(C): 36.4 (03 Oct 2022 12:16), Max: 36.6 (02 Oct 2022 20:35)  T(F): 97.5 (03 Oct 2022 12:16), Max: 97.9 (02 Oct 2022 20:35)  HR: 102 (03 Oct 2022 12:16) (102 - 106)  BP: 106/70 (03 Oct 2022 12:16) (106/70 - 125/78)  BP(mean): --  RR: 17 (03 Oct 2022 12:16) (17 - 18)  SpO2: 96% (03 Oct 2022 12:16) (96% - 100%)    Parameters below as of 03 Oct 2022 12:16  Patient On (Oxygen Delivery Method): room air        LABS:                          10.4   7.71  )-----------( 218      ( 03 Oct 2022 06:30 )             35.9       10-03    138  |  98  |  36<H>  ----------------------------<  128<H>  3.6   |  24  |  1.70<H>    Ca    9.2      03 Oct 2022 06:28  Mg     2.1     10-02        CAPILLARY BLOOD GLUCOSE      POCT Blood Glucose.: 177 mg/dL (03 Oct 2022 11:36)  POCT Blood Glucose.: 129 mg/dL (03 Oct 2022 08:28)  POCT Blood Glucose.: 146 mg/dL (02 Oct 2022 22:17)  POCT Blood Glucose.: 147 mg/dL (02 Oct 2022 17:16)          LOWER EXTREMITY PHYSICAL EXAM:    Vascular: DP/PT 2/4, B/L, CFT <3 seconds B/L, Temperature gradient warm to warm on the L and warm to cool on the R  Neuro: Epicritic sensation diminished to the level of ankle, B/L.  Musculoskeletal/Ortho: unremarkable  Skin:     L foot with healed L foot partial 3rd ray resection with no clinical signs of infection   R foot with sub metatarsal 3 wound to dermis with no clinical signs of infection. RLE with dependant rubor.       RADIOLOGY & ADDITIONAL STUDIES:

## 2022-10-03 NOTE — DISCHARGE NOTE PROVIDER - NSDCFUSCHEDAPPT_GEN_ALL_CORE_FT
[facility]  ELECTROPH [address]  Scheduled Appointment: 12/19/2022     [facility]  HEARTFAIL [address]  Scheduled Appointment: 10/18/2022    [facility] [address]  Scheduled Appointment: 12/19/2022     [facility]  HEARTFAIL [address]  Scheduled Appointment: 10/12/2022    [facility] [address]  Scheduled Appointment: 12/19/2022

## 2022-10-03 NOTE — PHARMACOTHERAPY INTERVENTION NOTE - COMMENTS
Confirmed home medications with patient and [facility] pharmacy, updated in Outpatient Medication Review.     Added  Alprazolam 0.25 mg PRN   Tamsulosin 0.4mg QD  Insulin Lispro 10 units TID (sliding scale)    Changed   Albuterol PRN   Furosemide 80mg in the AM, 40mg in the PM   Metoprolol succinate 50mg BID   Nicotine 21mg/24 hour transdermal patch     Discrepancies communicated with KADI Potter, Pharmacy student  Confirmed home medications with patient and [facility] pharmacy, updated in Outpatient Medication Review.     Added:  Alprazolam 0.25 mg PRN   Tamsulosin 0.4mg daily  Insulin Lispro 10 units TID (sliding scale)    Changed:  Albuterol to PRN   Furosemide dose to 80mg in the AM, 40mg in the PM   Metoprolol succinate to 50mg BID   Nicotine 21mg/24 hour transdermal patch     Discrepancies communicated with KADI Potter, Pharmacy Intern    Sofiya Escalera, PharmD, BCPS  Clinical Pharmacy Specialist  [phone] or Teams

## 2022-10-03 NOTE — PROGRESS NOTE ADULT - ASSESSMENT
61yoM with PMHx of HTN, CAD s/p stents, HFrEF s/p AICD, T2DM on insulin, osteo of L toe s/p amputation, recent tobacco use (quit 1mo prior to presentation), who presented with several weeks of lower extremity edema, orthopnea, and abdominal distension. Since being admitted he has been started on a Bumex gtt and has good response. His MANJIT is likely related to congestion. He remains volume overloaded with pitting edema up to his thighs and elevated JVP. Once euvolemic will optimize his GDMT.     1. HFrEF (EF 10-15%)  - Significantly volume overloaded on exam  - Stop Lasix IV.  - Bumex 4mg IV push followed by 2mg/hr gtt  - K 3.2 this AM, please replete to goal > 4 prior to further diuresis  - Hold Entresto, will likely resume low dose over next 1-2 days  - Continue Metoprolol Succinate 50mg daily  - Start Aldactone 25mg daily  - Daily standing weights, strict I+Os  - Check daily lactate, LFTs   - F/u TTE  - Monitor on Telemetry  - Keep K > 4, Mag > 2    2. [employer] subcutaneous ICD  - Please have EP interrogate device    61yoM with PMHx of HTN, CAD s/p stents, HFrEF s/p AICD, T2DM on insulin, osteo of L toe s/p amputation, recent tobacco use (quit 1mo prior to presentation), who presented with several weeks of lower extremity edema, orthopnea, and abdominal distension. Since being admitted he has been started on a Bumex gtt and has good response. His MANJIT is likely related to congestion. He remains volume overloaded with pitting edema up to his thighs and elevated JVP. Once euvolemic will optimize his GDMT.

## 2022-10-03 NOTE — PROVIDER CONTACT NOTE (OTHER) - ASSESSMENT
Pt A&O X4, VSS, no c/o cp or SOB. Pt on bumex gtt running at 10mL/hr. Pt c/o increasing pain in joint. Tylenol administered with no relief. Pt A&O X4, VSS, no c/o cp or SOB. Pt on bumex gtt running at 10mL/hr. Pt c/o increasing pain bilateral upper and lower extremities within joint. Rating 8/10. PO Tylenol administered with no relief.

## 2022-10-03 NOTE — PROGRESS NOTE ADULT - ASSESSMENT
61 year old Male with PHSx HTN, CAD s/p stents, HFrEF s/p AICD, T2DM on insulin, OM of L toe s/p amputation, He presents to ED with worsening LE edema for 1 week.       1- CKD III  2- CHF  3- T2DM  4- anemia  5- hypokalemia      patient states baseline creatinine range 2.0-2.3 g/dL  renal sono reviewed from 7/2022 right moderate to severe chronic hydronephrosis   fluid status improving,   non-oliguric,   decrease bumex drip to 0.5 mg/hr  trend K+  aldactone 25 mg daily  check U/A, check protein/creatinine ratio  anemia, iron deficiency   no IV venofer, may give iron supplement   trend hgb  strict I/O  keep O>I  monitor daily standing weight  trend creatinine and electrolytes BID while on bumex drip

## 2022-10-03 NOTE — PROGRESS NOTE ADULT - PROBLEM SELECTOR PLAN 1
- remains significantly volume overloaded on exam, reduce Bumex gtt 1mg/hr.   - continue Toprol 50 mg PO QD  - plan to resume home Entresto once euvolemic   - Continue Aldactone 25mg daily  - Daily standing weights, strict I+Os  - Check daily lactate, LFTs   - Plan to repeat TTE once euvolemic   - Keep K > 4, Mag > 2 - remains significantly volume overloaded on exam, reduce Bumex gtt 1mg/hr.   - continue Toprol 50 mg PO QD  - plan to resume home Entresto once euvolemic   - Continue Aldactone 25mg daily  - Daily standing weights, strict I+Os  - Check daily lactate, LFTs   - Plan to repeat TTE once euvolemic   - Keep K > 4, Mag > 2  - Device: [employer] subcutaneous ICD, please have EP interrogate device

## 2022-10-03 NOTE — DISCHARGE NOTE PROVIDER - CARE PROVIDER_API CALL
Billy Camara (MD)  Cardiovascular Disease; Internal Medicine; Interventional Cardiology  [address] [place] [address]  Phone: [phone]  Fax: [phone]  Follow Up Time: 2 weeks

## 2022-10-03 NOTE — DISCHARGE NOTE PROVIDER - HOSPITAL COURSE
62 y/o M with h/o HTN, CAD s/p stents, HFrEF s/p AICD, T2DM on insulin, osteo of L toe s/p amputation presenting with worsening LE edema for 1 week c/w acute on chronic systolic HF exacerbation.      Problem/Plan - 1:  ·  Problem: Acute on chronic systolic HF (heart failure).   ·  Plan: Worsening LE edema with mild R pleural effusion. Not hypoxic. Trigger unclear (possibly 2/2 to dietary indescretion)  D/C Bumex drip as per renal   Cards following  Bumex iv change to PO        Problem/Plan - 2:  ·  Problem: Acute kidney injury superimposed on CKD.   ·  Plan: likely iso worsening HF resolving   -holding entresto, will restart if stabilizes/improves  -diuresis as above  -monitor urine output   Aldactone.      Problem/Plan - 3:  ·  Problem: Hypertension.   ·  Plan: currently normotensive  -continue metop, holding entresto.     Problem/Plan - 4:  ·  Problem: Stented coronary artery.   ·  Plan: -continue DAPT, metoprolol  -continue statin.     Problem/Plan - 5:  ·  Problem: Diabetes.   ·  Plan: On LA 50 units qhs + TID 10 units if  or greater  -LA 40 units while admitted given changes in diet and MANJIT on CKD  -ISS  -uptitrate as needed.     Problem/Plan - 6:  ·  Problem: DVT prophylaxis.   ·  Plan: sqh.    Patient is hemodynamically stable and may be cleared for discharge.

## 2022-10-03 NOTE — PROGRESS NOTE ADULT - SUBJECTIVE AND OBJECTIVE BOX
Patient is a 61y old  Male who presents with a chief complaint of LE swelling (01 Oct 2022 13:42)      SUBJECTIVE / OVERNIGHT EVENTS: no events over night       T(C): 36.9 (10-03-22 @ 19:51), Max: 36.9 (10-03-22 @ 19:51)  HR: 107 (10-03-22 @ 19:51) (102 - 107)  BP: 111/67 (10-03-22 @ 19:51) (106/70 - 111/67)  RR: 18 (10-03-22 @ 19:51) (17 - 18)  SpO2: 94% (10-03-22 @ 19:51) (94% - 96%)  MEDICATIONS  (STANDING):  aspirin  chewable 81 milliGRAM(s) Oral daily  atorvastatin 80 milliGRAM(s) Oral at bedtime  clopidogrel Tablet 75 milliGRAM(s) Oral daily  dextrose 5%. 1000 milliLiter(s) (50 mL/Hr) IV Continuous <Continuous>  dextrose 5%. 1000 milliLiter(s) (100 mL/Hr) IV Continuous <Continuous>  dextrose 50% Injectable 25 Gram(s) IV Push once  dextrose 50% Injectable 12.5 Gram(s) IV Push once  dextrose 50% Injectable 25 Gram(s) IV Push once  glucagon  Injectable 1 milliGRAM(s) IntraMuscular once  heparin   Injectable 5000 Unit(s) SubCutaneous every 8 hours  insulin glargine Injectable (LANTUS) 40 Unit(s) SubCutaneous at bedtime  insulin lispro (ADMELOG) corrective regimen sliding scale   SubCutaneous three times a day before meals  metoprolol succinate ER 50 milliGRAM(s) Oral daily  mupirocin 2% Ointment 1 Application(s) Topical <User Schedule>  pantoprazole    Tablet 40 milliGRAM(s) Oral before breakfast  spironolactone 25 milliGRAM(s) Oral daily    MEDICATIONS  (PRN):  acetaminophen     Tablet .. 650 milliGRAM(s) Oral every 6 hours PRN Mild Pain (1 - 3), Moderate Pain (4 - 6)  dextrose Oral Gel 15 Gram(s) Oral once PRN Blood Glucose LESS THAN 70 milliGRAM(s)/deciliter    PHYSICAL EXAM:  GENERAL: NAD, well-developed  HEAD:  Atraumatic, Normocephalic  EYES: EOMI,  conjunctiva and sclera clear  NECK: Supple, No JVD  CHEST/LUNG: Clear to auscultation bilaterally; No wheeze  HEART: Regular rate and rhythm; No murmurs, rubs, or gallops  ABDOMEN: Soft, Nontender, Nondistended; Bowel sounds present  EXTREMITIES:  2 +edema   PSYCH: AAOx3  NEUROLOGY: non-focal  SKIN: No rashes or lesions                                        10.4   7.71  )-----------( 218      ( 03 Oct 2022 06:30 )             35.9               139|97|37<153  3.5|26|1.55  9.4,1.8,--  10-03 @ 18:44  138|98|36<128  3.6|24|1.70  9.2,--,--  10-03 @ 06:28      CAPILLARY BLOOD GLUCOSE      POCT Blood Glucose.: 222 mg/dL (03 Oct 2022 21:43)  POCT Blood Glucose.: 160 mg/dL (03 Oct 2022 17:23)  POCT Blood Glucose.: 177 mg/dL (03 Oct 2022 11:36)  POCT Blood Glucose.: 129 mg/dL (03 Oct 2022 08:28)      RADIOLOGY & ADDITIONAL TESTS:    Imaging Personally Reviewed:    Consultant(s) Notes Reviewed:      Care Discussed with Consultants/Other Providers:

## 2022-10-03 NOTE — PROGRESS NOTE ADULT - ASSESSMENT
Patient has an ischemic cardiomyopathy with very low LVEF, now woth wsorsening sob, edema and increased abdominal girth Presently hemodynamically. Troponin elevated secondary to renal insufficiency/CHF doubt scute ischemia/NSTEMI. Patient is improved on bumex drip    1. ischemic cardiomyoathy  2. acute on chronic CHF right greater than left  3. s/p stent to LCX  4. previous LVEF 15-20%  5. CRI    Recommend  apppreciate advanced heart failure input  repeat echo ordered  continue IV bumex  drip  careful watch on BUN creatinine etc- nephrology consult noted  daily weights  ?restart entresto as per heart failure team

## 2022-10-03 NOTE — DISCHARGE NOTE PROVIDER - NSDCFUADDAPPT_GEN_ALL_CORE_FT
Podiatry Discharge Instructions:  Follow up: Please follow up with Dr. Sigala within 1 week of discharge from the hospital, please call [phone] for appointment and discuss that you recently were seen in the hospital.  Wound Care: Please apply mupirocin ointment to the R foot sub metatarsal wound 3 followed by band-aid daily.   Weight bearing: Please weight bear as tolerated in a surgical shoe.  Antibiotics: Please continue as instructed. APPTS ARE READY TO BE MADE: [x] YES    Best Family or Patient Contact (if needed):    Additional Information about above appointments (if needed):    1:   2:   3:     Other comments or requests:     Podiatry Discharge Instructions:  Follow up: Please follow up with Dr. Sigala within 1 week of discharge from the hospital, please call [phone] for appointment and discuss that you recently were seen in the hospital.  Wound Care: Please apply mupirocin ointment to the R foot sub metatarsal wound 3 followed by band-aid daily.   Weight bearing: Please weight bear as tolerated in a surgical shoe.  Antibiotics: Please continue as instructed.    **[facility] [address].    **Electrophysiology appointment on 12/19 at [facility] 12pm [address]     Follow up with Primary Care Physician and Urology.   APPTS ARE READY TO BE MADE: [x] YES    Best Family or Patient Contact (if needed):    Additional Information about above appointments (if needed):    1:   2:   3:     Other comments or requests:     Podiatry Discharge Instructions:  Follow up: Please follow up with Dr. Sigala within 1 week of discharge from the hospital, please call [phone] for appointment and discuss that you recently were seen in the hospital.  Wound Care: Please apply mupirocin ointment to the R foot sub metatarsal wound 3 followed by band-aid daily.   Weight bearing: Please weight bear as tolerated in a surgical shoe.  Antibiotics: Please continue as instructed.    **Heart failure appointment on 10/18 at [facility] 2pm [address].    **Electrophysiology appointment on 12/19 at [facility] 12pm [address]     Follow up with Primary Care Physician and Urology.      Patient was provided with Dr. Zakiya Mchugh and Billy Camara's information and was advised to call to schedule follow up within specified time frame. At this time patient declined scheduling assistance.

## 2022-10-03 NOTE — CHART NOTE - NSCHARTNOTEFT_GEN_A_CORE
Asked by HF to check [facility] ICD as pt admitted with HF.   Pt has [employer] S-ICD ("shock box"), interrogation not indicated. His prior ICD was extracted in setting of bacteremia. Asked by HF to check [facility] ICD as pt admitted with HF.   Pt has [employer] S-ICD ("shock box"), interrogation not indicated. His prior ICD was extracted in setting of bacteremia.  Discussed with primary team.     - JOE Lester  [id number]

## 2022-10-03 NOTE — PROGRESS NOTE ADULT - SUBJECTIVE AND OBJECTIVE BOX
Subjective: Patient seen and examined. No new events except as noted.   Appreciate heart failure input  Patient is markedly improved with aggressive diuresis- inessa gaspar  good weight loss BP holding and feels much better  MEDICATIONS:  MEDICATIONS  (STANDING):  aspirin  chewable 81 milliGRAM(s) Oral daily  atorvastatin 80 milliGRAM(s) Oral at bedtime  buMETAnide Infusion 0.5 mG/Hr (2.5 mL/Hr) IV Continuous <Continuous>  clopidogrel Tablet 75 milliGRAM(s) Oral daily  dextrose 5%. 1000 milliLiter(s) (50 mL/Hr) IV Continuous <Continuous>  dextrose 5%. 1000 milliLiter(s) (100 mL/Hr) IV Continuous <Continuous>  dextrose 50% Injectable 25 Gram(s) IV Push once  dextrose 50% Injectable 12.5 Gram(s) IV Push once  dextrose 50% Injectable 25 Gram(s) IV Push once  glucagon  Injectable 1 milliGRAM(s) IntraMuscular once  heparin   Injectable 5000 Unit(s) SubCutaneous every 8 hours  insulin glargine Injectable (LANTUS) 40 Unit(s) SubCutaneous at bedtime  insulin lispro (ADMELOG) corrective regimen sliding scale   SubCutaneous three times a day before meals  metoprolol succinate ER 50 milliGRAM(s) Oral daily  pantoprazole    Tablet 40 milliGRAM(s) Oral before breakfast  spironolactone 25 milliGRAM(s) Oral daily      PHYSICAL EXAM:  T(C): 36.4 (10-03-22 @ 12:16), Max: 36.6 (10-02-22 @ 20:35)  HR: 102 (10-03-22 @ 12:16) (102 - 106)  BP: 106/70 (10-03-22 @ 12:16) (106/70 - 125/78)  RR: 17 (10-03-22 @ 12:16) (17 - 18)  SpO2: 96% (10-03-22 @ 12:16) (96% - 100%)  Wt(kg): --  I&O's Summary    02 Oct 2022 07:01  -  03 Oct 2022 07:00  --------------------------------------------------------  IN: 960 mL / OUT: 8860 mL / NET: -7900 mL    03 Oct 2022 07:01  -  03 Oct 2022 14:25  --------------------------------------------------------  IN: 480 mL / OUT: 0 mL / NET: 480 mL          Appearance: Normal 	  HEENT:   Normal oral mucosa, PERRL, EOMI	  Cardiovascular: Normal S1 S2, No JVD, No murmurs ,  Respiratory: Lungs clear to auscultation, normal effort 	  Gastrointestinal:  less protuberant	  Musculoskeletal: Normal range of motion, normal strength  Psychiatry:  Mood & affect appropriate  Ext: 1-2+ edema        LABS:    CARDIAC MARKERS:  CARDIAC MARKERS ( 30 Sep 2022 15:01 )  x     / x     / 408 U/L / x     / 11.5 ng/mL                                10.4   7.71  )-----------( 218      ( 03 Oct 2022 06:30 )             35.9     10-03    138  |  98  |  36<H>  ----------------------------<  128<H>  3.6   |  24  |  1.70<H>    Ca    9.2      03 Oct 2022 06:28  Mg     2.1     10-02      proBNP:   Lipid Profile:   HgA1c:   TSH:           TELEMETRY: 	    ECG:  	  RADIOLOGY:   DIAGNOSTIC TESTING:  < from: TTE with Doppler (w/Cont) (09.09.20 @ 05:44) >  Conclusions:  1. Aortic valve not well visualized; appears calcified.  2. Left ventricular enlargement.  3. Endocardial visualization enhanced with intravenous  injection of Ultrasonic Enhancing Agent (Definity).  Severe  global left ventricular systolic dysfunction.No obvious LV  thrombus. Estimated EF of 15-20%. No left ventricular  thrombus.  4. Severe diastolic dysfunction (Stage III).  5. A device wire is noted in the right heart.  6. Tricuspid valve not well visualized. No tricuspid  regurgitation.  7. Pulmonic valve not well visualized.  Unable to rule out endocarditis. Consider DONNIE if clinically  indicated.    repeat echo pending

## 2022-10-03 NOTE — DISCHARGE NOTE PROVIDER - NSDCMRMEDTOKEN_GEN_ALL_CORE_FT
albuterol 90 mcg/inh inhalation aerosol: 2 puff(s) inhaled every 4 hours, As Needed  Allegra 180 mg oral tablet: 1 tab(s) orally once a day  ALPRAZolam 0.25 mg oral tablet: 1 tab(s) orally , As Needed  aspirin 81 mg oral tablet, chewable: 1 tab(s) orally once a day  clopidogrel 75 mg oral tablet: 1 tab(s) orally once a day  Entresto 49 mg-51 mg oral tablet: 1 tab(s) orally 2 times a day  furosemide 40 mg oral tablet: 80mg in the AM, 40mg in the PM   insulin glargine 100 units/mL subcutaneous solution: 50 unit(s) subcutaneous once a day (at bedtime)  insulin lispro 100 units/mL subcutaneous solution: 10 unit(s) subcutaneous 3 times a day (sliding scale)   metoprolol succinate 50 mg oral tablet, extended release: 1 tab(s) orally 2 times a day  nicotine 21 mg/24 hr transdermal film, extended release: 1 patch transdermal once a day  pantoprazole 40 mg oral delayed release tablet: 1 tab(s) orally once a day  rosuvastatin 20 mg oral tablet: 1 tab(s) orally once a day  tamsulosin 0.4 mg oral capsule: 1 cap(s) orally once a day   albuterol 90 mcg/inh inhalation aerosol: 2 puff(s) inhaled every 4 hours, As Needed  Allegra 180 mg oral tablet: 1 tab(s) orally once a day  ALPRAZolam 0.25 mg oral tablet: 1 tab(s) orally , As Needed  aspirin 81 mg oral tablet, chewable: 1 tab(s) orally once a day  clopidogrel 75 mg oral tablet: 1 tab(s) orally once a day  Entresto 49 mg-51 mg oral tablet: 1 tab(s) orally 2 times a day  Farxiga 10 mg oral tablet: 1 tab(s) orally once a day   furosemide 40 mg oral tablet: 80mg in the AM, 40mg in the PM   insulin glargine 100 units/mL subcutaneous solution: 50 unit(s) subcutaneous once a day (at bedtime)  insulin lispro 100 units/mL subcutaneous solution: 10 unit(s) subcutaneous 3 times a day (sliding scale)   metoprolol succinate 50 mg oral tablet, extended release: 1 tab(s) orally 2 times a day  nicotine 21 mg/24 hr transdermal film, extended release: 1 patch transdermal once a day  pantoprazole 40 mg oral delayed release tablet: 1 tab(s) orally once a day  rosuvastatin 20 mg oral tablet: 1 tab(s) orally once a day  tamsulosin 0.4 mg oral capsule: 1 cap(s) orally once a day   albuterol 90 mcg/inh inhalation aerosol: 1 puff(s) inhaled 3 times a day, As needed, Shortness of Breath and/or Wheezing  Allegra 180 mg oral tablet: 1 tab(s) orally once a day  ALPRAZolam 0.25 mg oral tablet: 1 tab(s) orally , As Needed  aspirin 81 mg oral tablet, chewable: 1 tab(s) orally once a day  atorvastatin 80 mg oral tablet: 1 tab(s) orally once a day (at bedtime)  bumetanide 2 mg oral tablet: 1 tab(s) orally once a day  clopidogrel 75 mg oral tablet: 1 tab(s) orally once a day  insulin glargine 100 units/mL subcutaneous solution: 40 unit(s) subcutaneous once a day (at bedtime)  insulin lispro 100 units/mL subcutaneous solution: 10 unit(s) subcutaneous 3 times a day (sliding scale)   Jardiance 10 mg oral tablet: 1 tab(s) orally once a day   metoprolol succinate 25 mg oral tablet, extended release: 3 tab(s) orally once a day  nicotine 21 mg/24 hr transdermal film, extended release: 1 patch transdermal once a day  pantoprazole 40 mg oral delayed release tablet: 1 tab(s) orally once a day (before a meal)  sacubitril-valsartan 24 mg-26 mg oral tablet: 1 tab(s) orally 2 times a day  spironolactone 25 mg oral tablet: 2 tab(s) orally once a day  tamsulosin 0.4 mg oral capsule: 1 cap(s) orally once a day

## 2022-10-04 LAB
ANION GAP SERPL CALC-SCNC: 15 MMOL/L — SIGNIFICANT CHANGE UP (ref 5–17)
BUN SERPL-MCNC: 41 MG/DL — HIGH (ref 7–23)
CALCIUM SERPL-MCNC: 9.2 MG/DL — SIGNIFICANT CHANGE UP (ref 8.4–10.5)
CHLORIDE SERPL-SCNC: 96 MMOL/L — SIGNIFICANT CHANGE UP (ref 96–108)
CO2 SERPL-SCNC: 27 MMOL/L — SIGNIFICANT CHANGE UP (ref 22–31)
CREAT SERPL-MCNC: 1.64 MG/DL — HIGH (ref 0.5–1.3)
EGFR: 48 ML/MIN/1.73M2 — LOW
GLUCOSE BLDC GLUCOMTR-MCNC: 151 MG/DL — HIGH (ref 70–99)
GLUCOSE BLDC GLUCOMTR-MCNC: 160 MG/DL — HIGH (ref 70–99)
GLUCOSE BLDC GLUCOMTR-MCNC: 193 MG/DL — HIGH (ref 70–99)
GLUCOSE BLDC GLUCOMTR-MCNC: 247 MG/DL — HIGH (ref 70–99)
GLUCOSE SERPL-MCNC: 190 MG/DL — HIGH (ref 70–99)
HCT VFR BLD CALC: 35.2 % — LOW (ref 39–50)
HGB BLD-MCNC: 10.4 G/DL — LOW (ref 13–17)
MAGNESIUM SERPL-MCNC: 1.8 MG/DL — SIGNIFICANT CHANGE UP (ref 1.6–2.6)
MCHC RBC-ENTMCNC: 23.2 PG — LOW (ref 27–34)
MCHC RBC-ENTMCNC: 29.5 GM/DL — LOW (ref 32–36)
MCV RBC AUTO: 78.6 FL — LOW (ref 80–100)
NRBC # BLD: 0 /100 WBCS — SIGNIFICANT CHANGE UP (ref 0–0)
PLATELET # BLD AUTO: 215 K/UL — SIGNIFICANT CHANGE UP (ref 150–400)
POTASSIUM SERPL-MCNC: 3.5 MMOL/L — SIGNIFICANT CHANGE UP (ref 3.5–5.3)
POTASSIUM SERPL-SCNC: 3.5 MMOL/L — SIGNIFICANT CHANGE UP (ref 3.5–5.3)
RBC # BLD: 4.48 M/UL — SIGNIFICANT CHANGE UP (ref 4.2–5.8)
RBC # FLD: 20 % — HIGH (ref 10.3–14.5)
SODIUM SERPL-SCNC: 138 MMOL/L — SIGNIFICANT CHANGE UP (ref 135–145)
WBC # BLD: 6.98 K/UL — SIGNIFICANT CHANGE UP (ref 3.8–10.5)
WBC # FLD AUTO: 6.98 K/UL — SIGNIFICANT CHANGE UP (ref 3.8–10.5)

## 2022-10-04 PROCEDURE — 99232 SBSQ HOSP IP/OBS MODERATE 35: CPT

## 2022-10-04 PROCEDURE — 99233 SBSQ HOSP IP/OBS HIGH 50: CPT

## 2022-10-04 PROCEDURE — 93260 PRGRMG DEV EVAL IMPLTBL SYS: CPT | Mod: 26

## 2022-10-04 RX ORDER — ALBUTEROL 90 UG/1
1 AEROSOL, METERED ORAL THREE TIMES A DAY
Refills: 0 | Status: DISCONTINUED | OUTPATIENT
Start: 2022-10-04 | End: 2022-10-07

## 2022-10-04 RX ORDER — TAMSULOSIN HYDROCHLORIDE 0.4 MG/1
0.4 CAPSULE ORAL ONCE
Refills: 0 | Status: COMPLETED | OUTPATIENT
Start: 2022-10-04 | End: 2022-10-04

## 2022-10-04 RX ORDER — POTASSIUM CHLORIDE 20 MEQ
40 PACKET (EA) ORAL ONCE
Refills: 0 | Status: COMPLETED | OUTPATIENT
Start: 2022-10-04 | End: 2022-10-04

## 2022-10-04 RX ORDER — TAMSULOSIN HYDROCHLORIDE 0.4 MG/1
0.4 CAPSULE ORAL AT BEDTIME
Refills: 0 | Status: DISCONTINUED | OUTPATIENT
Start: 2022-10-04 | End: 2022-10-07

## 2022-10-04 RX ORDER — NICOTINE POLACRILEX 2 MG
1 GUM BUCCAL DAILY
Refills: 0 | Status: DISCONTINUED | OUTPATIENT
Start: 2022-10-04 | End: 2022-10-07

## 2022-10-04 RX ORDER — MAGNESIUM SULFATE 500 MG/ML
1 VIAL (ML) INJECTION ONCE
Refills: 0 | Status: COMPLETED | OUTPATIENT
Start: 2022-10-04 | End: 2022-10-04

## 2022-10-04 RX ORDER — BUMETANIDE 0.25 MG/ML
2 INJECTION INTRAMUSCULAR; INTRAVENOUS
Refills: 0 | Status: DISCONTINUED | OUTPATIENT
Start: 2022-10-04 | End: 2022-10-06

## 2022-10-04 RX ORDER — SACUBITRIL AND VALSARTAN 24; 26 MG/1; MG/1
1 TABLET, FILM COATED ORAL
Refills: 0 | Status: DISCONTINUED | OUTPATIENT
Start: 2022-10-04 | End: 2022-10-07

## 2022-10-04 RX ADMIN — Medication 2: at 17:39

## 2022-10-04 RX ADMIN — Medication 50 MILLIGRAM(S): at 05:21

## 2022-10-04 RX ADMIN — SACUBITRIL AND VALSARTAN 1 TABLET(S): 24; 26 TABLET, FILM COATED ORAL at 17:39

## 2022-10-04 RX ADMIN — CLOPIDOGREL BISULFATE 75 MILLIGRAM(S): 75 TABLET, FILM COATED ORAL at 11:13

## 2022-10-04 RX ADMIN — Medication 100 GRAM(S): at 09:06

## 2022-10-04 RX ADMIN — Medication 650 MILLIGRAM(S): at 00:31

## 2022-10-04 RX ADMIN — HEPARIN SODIUM 5000 UNIT(S): 5000 INJECTION INTRAVENOUS; SUBCUTANEOUS at 13:25

## 2022-10-04 RX ADMIN — Medication 650 MILLIGRAM(S): at 01:01

## 2022-10-04 RX ADMIN — Medication 1 PATCH: at 19:30

## 2022-10-04 RX ADMIN — SPIRONOLACTONE 25 MILLIGRAM(S): 25 TABLET, FILM COATED ORAL at 05:22

## 2022-10-04 RX ADMIN — HEPARIN SODIUM 5000 UNIT(S): 5000 INJECTION INTRAVENOUS; SUBCUTANEOUS at 05:22

## 2022-10-04 RX ADMIN — TAMSULOSIN HYDROCHLORIDE 0.4 MILLIGRAM(S): 0.4 CAPSULE ORAL at 10:56

## 2022-10-04 RX ADMIN — Medication 2: at 11:13

## 2022-10-04 RX ADMIN — HEPARIN SODIUM 5000 UNIT(S): 5000 INJECTION INTRAVENOUS; SUBCUTANEOUS at 22:34

## 2022-10-04 RX ADMIN — Medication 81 MILLIGRAM(S): at 11:12

## 2022-10-04 RX ADMIN — Medication 650 MILLIGRAM(S): at 23:12

## 2022-10-04 RX ADMIN — Medication 1 PATCH: at 11:16

## 2022-10-04 RX ADMIN — PANTOPRAZOLE SODIUM 40 MILLIGRAM(S): 20 TABLET, DELAYED RELEASE ORAL at 05:22

## 2022-10-04 RX ADMIN — MUPIROCIN 1 APPLICATION(S): 20 OINTMENT TOPICAL at 13:25

## 2022-10-04 RX ADMIN — ATORVASTATIN CALCIUM 80 MILLIGRAM(S): 80 TABLET, FILM COATED ORAL at 22:34

## 2022-10-04 RX ADMIN — Medication 2: at 08:24

## 2022-10-04 RX ADMIN — Medication 40 MILLIEQUIVALENT(S): at 09:06

## 2022-10-04 RX ADMIN — BUMETANIDE 2 MILLIGRAM(S): 0.25 INJECTION INTRAMUSCULAR; INTRAVENOUS at 13:25

## 2022-10-04 RX ADMIN — INSULIN GLARGINE 40 UNIT(S): 100 INJECTION, SOLUTION SUBCUTANEOUS at 22:33

## 2022-10-04 RX ADMIN — TAMSULOSIN HYDROCHLORIDE 0.4 MILLIGRAM(S): 0.4 CAPSULE ORAL at 22:33

## 2022-10-04 NOTE — PROGRESS NOTE ADULT - PROBLEM SELECTOR PLAN 1
- volume status overall improving though remains overloaded. Make Bumex 2 mg IV BID   - continue Toprol 50 mg PO QD  - resume Entresto 24-26 mg BID  - Continue Aldactone 25mg daily  - plan to start Farxiga once off IV diuretics   - Daily standing weights, strict I+Os  - Check daily lactate, LFTs   - Plan to repeat TTE once euvolemic   - Keep K > 4, Mag > 2  - Device: [employer] subcutaneous ICD

## 2022-10-04 NOTE — PROGRESS NOTE ADULT - ASSESSMENT
Patient has an ischemic cardiomyopathy with very low LVEF, now woth wsorsening sob, edema and increased abdominal girth Presently hemodynamically.  patient has lost almost 20 pounds and is feeling much better but is slightly tachycardic and does have elevation of his BUN and creatinine  1. ischemic cardiomyoathy  2. acute on chronic CHF right greater than left  3. s/p stent to LCX  4. previous LVEF 20%  5. CRI    Recommend  apppreciate advanced heart failure input  repeat echo EF unchanged 20%  ?swithc to PO diuretics  careful watch on BUN creatinine etc- nephrology consult noted  daily weights  restart entresto as per heart failure team  OOB and ambulate  discharge planning

## 2022-10-04 NOTE — PROCEDURE NOTE - ADDITIONAL PROCEDURE DETAILS
1) Stored data revealed 1 episode of NSVT lasting ~10 seconds that self-resolved on 9/24/22, 1 episode of AF on 8/26/22  2) Battery Longevity: 73%    [id number]

## 2022-10-04 NOTE — PROVIDER CONTACT NOTE (OTHER) - RECOMMENDATIONS
percocet ordered and administered. Will continue to monitor.
Please come see pt at the bedside. Possible one time dose of percocet? UA?

## 2022-10-04 NOTE — PROGRESS NOTE ADULT - ASSESSMENT
61 year old Male with PHSx HTN, CAD s/p stents, HFrEF s/p AICD, T2DM on insulin, OM of L toe s/p amputation, He presents to ED with worsening LE edema for 1 week.       1- CKD III  2- CHF  3- T2DM  4- anemia  5- hypokalemia      patient states baseline creatinine range 2.0-2.3 g/dL  renal sono reviewed from 7/2022 right moderate to severe chronic hydronephrosis   fluid status improving,   non-oliguric,   off bumex drip, transition to bumex 2mg IVP bid  trend K+  aldactone 25 mg daily  anemia, iron deficiency   no IV venofer, may give iron supplement   trend hgb  strict I/O  keep O>I  monitor daily standing weight  trend creatinine and electrolytes daily

## 2022-10-04 NOTE — PROVIDER CONTACT NOTE (OTHER) - REASON
Pt c/o increasing pain, on bumex gtt at 10mL/hr
Pt c/o increasing pain in joints and difficulty urinating

## 2022-10-04 NOTE — PROGRESS NOTE ADULT - ASSESSMENT
61yoM with PMHx of HTN, CAD s/p stents, HFrEF s/p AICD, T2DM on insulin, osteo of L toe s/p amputation, recent tobacco use (quit 1mo prior to presentation), who presented with several weeks of lower extremity edema, orthopnea, and abdominal distension. He was initially on a Bumex gtt and has been transitioned of IV diuretics. Thus far he has lost ~20 pounds. His MANJIT is likely related to congestion. He remains volume overloaded with pitting edema up to his thighs and elevated JVP. Once euvolemic will optimize his GDMT. Hopeful for discharge home end of week.

## 2022-10-04 NOTE — PROVIDER CONTACT NOTE (OTHER) - ASSESSMENT
Pt A&O X4, VSS, Bumex running at 5mL/hr. Pt c/o 10/10 pain in his bilateral upper and lower joints and pain while urinating, "constantly feeling the urge to pee". Bladder scan done, 135mL observed.

## 2022-10-04 NOTE — PROGRESS NOTE ADULT - SUBJECTIVE AND OBJECTIVE BOX
[facility]   [phone]  RENAL FOLLOW UP NOTE  --------------------------------------------------------------------------------  Chief Complaint:    24 hour events/subjective:    patient seen and examined with Dr. Payton rm    PAST HISTORY  --------------------------------------------------------------------------------  No significant changes to PMH, PSH, FHx, SHx, unless otherwise noted    ALLERGIES & MEDICATIONS  --------------------------------------------------------------------------------  Allergies    No Known Allergies    Intolerances      Standing Inpatient Medications  aspirin  chewable 81 milliGRAM(s) Oral daily  atorvastatin 80 milliGRAM(s) Oral at bedtime  buMETAnide Injectable 2 milliGRAM(s) IV Push two times a day  clopidogrel Tablet 75 milliGRAM(s) Oral daily  dextrose 5%. 1000 milliLiter(s) IV Continuous <Continuous>  dextrose 5%. 1000 milliLiter(s) IV Continuous <Continuous>  dextrose 50% Injectable 25 Gram(s) IV Push once  dextrose 50% Injectable 12.5 Gram(s) IV Push once  dextrose 50% Injectable 25 Gram(s) IV Push once  glucagon  Injectable 1 milliGRAM(s) IntraMuscular once  heparin   Injectable 5000 Unit(s) SubCutaneous every 8 hours  insulin glargine Injectable (LANTUS) 40 Unit(s) SubCutaneous at bedtime  insulin lispro (ADMELOG) corrective regimen sliding scale   SubCutaneous three times a day before meals  metoprolol succinate ER 50 milliGRAM(s) Oral daily  mupirocin 2% Ointment 1 Application(s) Topical <User Schedule>  nicotine - 21 mG/24Hr(s) Patch 1 Patch Transdermal daily  pantoprazole    Tablet 40 milliGRAM(s) Oral before breakfast  sacubitril 24 mG/valsartan 26 mG 1 Tablet(s) Oral two times a day  spironolactone 25 milliGRAM(s) Oral daily  tamsulosin 0.4 milliGRAM(s) Oral at bedtime    PRN Inpatient Medications  acetaminophen     Tablet .. 650 milliGRAM(s) Oral every 6 hours PRN  ALBUTerol    90 MICROgram(s) HFA Inhaler 1 Puff(s) Inhalation three times a day PRN  dextrose Oral Gel 15 Gram(s) Oral once PRN      REVIEW OF SYSTEMS  --------------------------------------------------------------------------------    Gen: denies fevers/chills,  CVS: denies chest pain/palpitations  Resp: denies worsening SOB/Cough  GI: Denies N/V/Abd pain  : Denies dysuria/oliguria/hematuria    VITALS/PHYSICAL EXAM  --------------------------------------------------------------------------------  T(C): 36.7 (10-04-22 @ 11:20), Max: 36.9 (10-03-22 @ 19:51)  HR: 109 (10-04-22 @ 13:28) (103 - 109)  BP: 98/62 (10-04-22 @ 13:28) (98/62 - 114/72)  RR: 18 (10-04-22 @ 11:20) (18 - 18)  SpO2: 95% (10-04-22 @ 11:20) (94% - 96%)  Wt(kg): --        10-03-22 @ 07:01  -  10-04-22 @ 07:00  --------------------------------------------------------  IN: 795 mL / OUT: 4450 mL / NET: -3655 mL    10-04-22 @ 07:01  -  10-04-22 @ 15:22  --------------------------------------------------------  IN: 0 mL / OUT: 400 mL / NET: -400 mL      Physical Exam:  	              Gen: Appears comfortable   	Pulm: decrease bs, no rales, ronchi or wheezing  	CV: +Mild JVD. RRR, S1S2; no rub  	Abd: +BS, soft, nontender/+distended, +ascites, improving  	: No suprapubic tenderness  	UE: Warm, no cyanosis  no clubbing,  no edema;  	LE: Warm, no cyanosis  no clubbing, B/L 3+ edema up to thigh improving  	Skin: Warm, no decrease skin turgor, chronic venous stasis changes, tattoos    LABS/STUDIES  --------------------------------------------------------------------------------              10.4   6.98  >-----------<  215      [10-04-22 @ 05:42]              35.2     138  |  96  |  41  ----------------------------<  190      [10-04-22 @ 05:42]  3.5   |  27  |  1.64        Ca     9.2     [10-04-22 @ 05:42]      Mg     1.8     [10-04-22 @ 05:42]            Creatinine Trend:  SCr 1.64 [10-04 @ 05:42]  SCr 1.55 [10-03 @ 18:44]  SCr 1.70 [10-03 @ 06:28]  SCr 1.63 [10-02 @ 18:15]  SCr 1.57 [10-02 @ 07:03]              Urinalysis - [10-03-22 @ 15:27]      Color Colorless / Appearance Clear / SG 1.008 / pH 6.0      Gluc Negative / Ketone Negative  / Bili Negative / Urobili Negative       Blood Negative / Protein Trace / Leuk Est Negative / Nitrite Negative      RBC  / WBC  / Hyaline  / Gran  / Sq Epi  / Non Sq Epi  / Bacteria     Urine Creatinine 30      [10-03-22 @ 15:43]  Urine Protein 13      [10-03-22 @ 15:43]    Iron 33, TIBC 390, %sat 9      [10-02-22 @ 07:03]  Ferritin 86      [10-02-22 @ 07:04]  PTH -- (Ca 8.9)      [10-02-22 @ 07:04]   73  HbA1c 8.9      [12-16-19 @ 08:15]  TSH 3.02      [09-30-22 @ 13:45]

## 2022-10-04 NOTE — PROVIDER CONTACT NOTE (OTHER) - SITUATION
Pt c/o increasing pain in joints and difficulty urinating
Pt c/o increasing pain, on bumex gtt at 10mL/hr

## 2022-10-04 NOTE — PROGRESS NOTE ADULT - SUBJECTIVE AND OBJECTIVE BOX
Subjective: Patient seen and examined. No new events except as noted.   Patient is feeling well and has now lost almost 20 pounds.  He continues to pass large amounts of urine which makes him uncomfortable as he has to strain and his hemorrhoids are worse   monitoring has revealed normal sinus rhythm.  External defibrillator will be checked to see if he has had any events such as a shock   he presently is off Entresto   creatinine is now up to 1.7 with a BUN over 40 with a slightly decreased potassium.  MEDICATIONS:  MEDICATIONS  (STANDING):  aspirin  chewable 81 milliGRAM(s) Oral daily  atorvastatin 80 milliGRAM(s) Oral at bedtime  clopidogrel Tablet 75 milliGRAM(s) Oral daily  dextrose 5%. 1000 milliLiter(s) (50 mL/Hr) IV Continuous <Continuous>  dextrose 5%. 1000 milliLiter(s) (100 mL/Hr) IV Continuous <Continuous>  dextrose 50% Injectable 25 Gram(s) IV Push once  dextrose 50% Injectable 12.5 Gram(s) IV Push once  dextrose 50% Injectable 25 Gram(s) IV Push once  glucagon  Injectable 1 milliGRAM(s) IntraMuscular once  heparin   Injectable 5000 Unit(s) SubCutaneous every 8 hours  insulin glargine Injectable (LANTUS) 40 Unit(s) SubCutaneous at bedtime  insulin lispro (ADMELOG) corrective regimen sliding scale   SubCutaneous three times a day before meals  metoprolol succinate ER 50 milliGRAM(s) Oral daily  mupirocin 2% Ointment 1 Application(s) Topical <User Schedule>  nicotine - 21 mG/24Hr(s) Patch 1 Patch Transdermal daily  pantoprazole    Tablet 40 milliGRAM(s) Oral before breakfast  spironolactone 25 milliGRAM(s) Oral daily  tamsulosin 0.4 milliGRAM(s) Oral at bedtime      PHYSICAL EXAM:  T(C): 36.7 (10-04-22 @ 11:20), Max: 36.9 (10-03-22 @ 19:51)  HR: 103 (10-04-22 @ 11:20) (102 - 107)  BP: 107/69 (10-04-22 @ 11:20) (106/70 - 114/72)  RR: 18 (10-04-22 @ 11:20) (17 - 18)  SpO2: 95% (10-04-22 @ 11:20) (94% - 96%)  Wt(kg): --  I&O's Summary    03 Oct 2022 07:01  -  04 Oct 2022 07:00  --------------------------------------------------------  IN: 795 mL / OUT: 4450 mL / NET: -3655 mL    04 Oct 2022 07:01  -  04 Oct 2022 11:33  --------------------------------------------------------  IN: 0 mL / OUT: 400 mL / NET: -400 mL          Appearance: Normal	  Looks well in excellent spirits  HEENT:   Normal oral mucosa, PERRL, EOMI	  Cardiovascular: Normal S1 S2, No JVD, No murmurs ,  Respiratory: Lungs clear to auscultation, normal effort 	  Gastrointestinal:    less protuberant  Psychiatry:  Mood & affect appropriate  Ext:  trace edema with stasis changes of the lower  extremity        LABS:    CARDIAC MARKERS:                                10.4   6.98  )-----------( 215      ( 04 Oct 2022 05:42 )             35.2     10-04    138  |  96  |  41<H>  ----------------------------<  190<H>  3.5   |  27  |  1.64<H>    Ca    9.2      04 Oct 2022 05:42  Mg     1.8     10-04      proBNP:   Lipid Profile:   HgA1c:   TSH:           TELEMETRY: 	    ECG:  	NSR  RADIOLOGY:   DIAGNOSTIC TESTING:  < from: TTE with Doppler (w/Cont) (10.03.22 @ 13:30) >  Observations:  Mitral Valve: Tethered mitral valve leaflets with normal  opening. Mild mitral regurgitation.  Aortic Valve/Aorta: Normal trileaflet aortic valve. Peak  transaortic valve gradient equals 3 mm Hg, mean transaortic  valve gradient equals 2 mm Hg, aortic valve velocity time  integral equals 16 cm. Minimal aortic regurgitation. Peak  left ventricular outflow tract gradient equals 1 mm Hg,  mean gradientis equal to 2 mm Hg, LVOT velocity time  integral equals 9 cm.  Aortic Root: 3.3 cm.  Left Atrium: Normal left atrium.  LA volume index = 32  cc/m2.  Left Ventricle: Endocardial visualization enhanced with  intravenous injection of Ultrasonic Enhancing Agent  (Lumason). Severe global left ventricular systolic  dysfunction with regional variation. No left ventricular  thrombus. Moderate left ventricular enlargement.  Indeterminate diastolic function.    Right Heart: Normal right atrium. The right ventricle is  not well visualized; grossly normal right ventricular size  and systolic function. Tricuspid valve not well visualized,  probably normal. Mild tricuspid regurgitation. Normal  pulmonic valve. Minimal pulmonic regurgitation.  Pericardium/Pleura: Normal pericardium with no pericardial  Pericardium/Pleura: Normal pericardium with no pericardial  effusion.  Hemodynamic: Dilated IVC (diameterabout 2.6 cm) with less  than 50% respiratory variation in size consistent with  estimated RA pressure 15 mmHg. Estimated right ventricular  systolic pressure equals 56 mm Hg, assuming right atrial  pressure equals 15 mm Hg, consistent with moderate  pulmonary hypertension.  ------------------------------------------------------------------------  Conclusions:  1. Tethered mitral valve leaflets with normal opening. Mild  mitral regurgitation.  2. Normal trileaflet aortic valve. Minimal aortic  regurgitation.  3. Endocardial visualization enhanced with intravenous  injection of Ultrasonic Enhancing Agent (Lumason). Severe  global left ventricular systolicdysfunction with regional  variation. No left ventricular thrombus.  4. The right ventricle is not well visualized; grossly  normal right ventricular size and systolic function.  5. Estimated pulmonary artery systolic pressure equals 56  mm Hg, assuming right atrial pressure equals 15 mm Hg,  consistent with moderate pulmonary pressures.  *** Compared with echocardiogram of 9/9/2020, results are  similar on today's study. Estimated PA systolic pressure is  consistent with moderate pulmonary hypertenson on today's  study. Pulmonary pressure was not estimated on the prior

## 2022-10-04 NOTE — PROGRESS NOTE ADULT - PROBLEM SELECTOR PLAN 1
Worsening LE edema with mild R pleural effusion. Not hypoxic. Trigger unclear (possibly 2/2 to dietary indescretion)  D/C Bumex as per renal   Cards following Worsening LE edema with mild R pleural effusion. Not hypoxic. Trigger unclear (possibly 2/2 to dietary indescretion)  D/C Bumex drip as per renal   Cards following  Bumex iv    ICD interrogation today

## 2022-10-04 NOTE — PROGRESS NOTE ADULT - SUBJECTIVE AND OBJECTIVE BOX
Subjective: feels well, however states that last night he had joint pain and trouble urinating    Medications:  acetaminophen     Tablet .. 650 milliGRAM(s) Oral every 6 hours PRN  ALBUTerol    90 MICROgram(s) HFA Inhaler 1 Puff(s) Inhalation three times a day PRN  aspirin  chewable 81 milliGRAM(s) Oral daily  atorvastatin 80 milliGRAM(s) Oral at bedtime  buMETAnide Injectable 2 milliGRAM(s) IV Push two times a day  clopidogrel Tablet 75 milliGRAM(s) Oral daily  dextrose 5%. 1000 milliLiter(s) IV Continuous <Continuous>  dextrose 5%. 1000 milliLiter(s) IV Continuous <Continuous>  dextrose 50% Injectable 25 Gram(s) IV Push once  dextrose 50% Injectable 12.5 Gram(s) IV Push once  dextrose 50% Injectable 25 Gram(s) IV Push once  dextrose Oral Gel 15 Gram(s) Oral once PRN  glucagon  Injectable 1 milliGRAM(s) IntraMuscular once  heparin   Injectable 5000 Unit(s) SubCutaneous every 8 hours  insulin glargine Injectable (LANTUS) 40 Unit(s) SubCutaneous at bedtime  insulin lispro (ADMELOG) corrective regimen sliding scale   SubCutaneous three times a day before meals  metoprolol succinate ER 50 milliGRAM(s) Oral daily  mupirocin 2% Ointment 1 Application(s) Topical <User Schedule>  nicotine - 21 mG/24Hr(s) Patch 1 Patch Transdermal daily  pantoprazole    Tablet 40 milliGRAM(s) Oral before breakfast  spironolactone 25 milliGRAM(s) Oral daily  tamsulosin 0.4 milliGRAM(s) Oral at bedtime    Vitals:  Vital Signs Last 24 Hours  T(C): 36.7 (10-04-22 @ 11:20), Max: 36.9 (10-03-22 @ 19:51)  HR: 109 (10-04-22 @ 13:28) (103 - 109)  BP: 98/62 (10-04-22 @ 13:28) (98/62 - 114/72)  RR: 18 (10-04-22 @ 11:20) (18 - 18)  SpO2: 95% (10-04-22 @ 11:20) (94% - 96%)    Weight in k.7 (10-04 @ 05:29)    I&O's Summary    03 Oct 2022 07:01  -  04 Oct 2022 07:00  --------------------------------------------------------  IN: 795 mL / OUT: 4450 mL / NET: -3655 mL    04 Oct 2022 07:01  -  04 Oct 2022 13:40  --------------------------------------------------------  IN: 0 mL / OUT: 400 mL / NET: -400 mL      Physical Exam  General: No distress. Comfortable.  Neck: JVP ~16-18 cm  Chest: Crackles at bases b/l   CV: Normal S1 and S2. No murmurs, rub, or gallops. Radial pulses normal.  Abdomen: Soft, non-distended, non-tender  Extremities: warm and well perfused, 2+ pitting edema up to tigh  Neurology: Alert and oriented times three.       Labs:                        10.4   6.98  )-----------( 215      ( 04 Oct 2022 05:42 )             35.2     10-04    138  |  96  |  41<H>  ----------------------------<  190<H>  3.5   |  27  |  1.64<H>    Ca    9.2      04 Oct 2022 05:42  Mg     1.8     10-04            Serum Pro-Brain Natriuretic Peptide: 2777 pg/mL ( @ 13:45)

## 2022-10-05 LAB
ANION GAP SERPL CALC-SCNC: 14 MMOL/L — SIGNIFICANT CHANGE UP (ref 5–17)
BUN SERPL-MCNC: 37 MG/DL — HIGH (ref 7–23)
CALCIUM SERPL-MCNC: 9.1 MG/DL — SIGNIFICANT CHANGE UP (ref 8.4–10.5)
CHLORIDE SERPL-SCNC: 99 MMOL/L — SIGNIFICANT CHANGE UP (ref 96–108)
CO2 SERPL-SCNC: 27 MMOL/L — SIGNIFICANT CHANGE UP (ref 22–31)
CREAT SERPL-MCNC: 1.65 MG/DL — HIGH (ref 0.5–1.3)
EGFR: 48 ML/MIN/1.73M2 — LOW
GLUCOSE BLDC GLUCOMTR-MCNC: 129 MG/DL — HIGH (ref 70–99)
GLUCOSE BLDC GLUCOMTR-MCNC: 146 MG/DL — HIGH (ref 70–99)
GLUCOSE BLDC GLUCOMTR-MCNC: 157 MG/DL — HIGH (ref 70–99)
GLUCOSE BLDC GLUCOMTR-MCNC: 189 MG/DL — HIGH (ref 70–99)
GLUCOSE SERPL-MCNC: 151 MG/DL — HIGH (ref 70–99)
MAGNESIUM SERPL-MCNC: 2.1 MG/DL — SIGNIFICANT CHANGE UP (ref 1.6–2.6)
PHOSPHATE SERPL-MCNC: 3.7 MG/DL — SIGNIFICANT CHANGE UP (ref 2.5–4.5)
POTASSIUM SERPL-MCNC: 3.4 MMOL/L — LOW (ref 3.5–5.3)
POTASSIUM SERPL-SCNC: 3.4 MMOL/L — LOW (ref 3.5–5.3)
SODIUM SERPL-SCNC: 140 MMOL/L — SIGNIFICANT CHANGE UP (ref 135–145)

## 2022-10-05 PROCEDURE — 99233 SBSQ HOSP IP/OBS HIGH 50: CPT

## 2022-10-05 RX ORDER — POTASSIUM CHLORIDE 20 MEQ
20 PACKET (EA) ORAL ONCE
Refills: 0 | Status: DISCONTINUED | OUTPATIENT
Start: 2022-10-05 | End: 2022-10-05

## 2022-10-05 RX ORDER — POTASSIUM CHLORIDE 20 MEQ
40 PACKET (EA) ORAL ONCE
Refills: 0 | Status: COMPLETED | OUTPATIENT
Start: 2022-10-05 | End: 2022-10-05

## 2022-10-05 RX ORDER — POTASSIUM CHLORIDE 20 MEQ
20 PACKET (EA) ORAL ONCE
Refills: 0 | Status: COMPLETED | OUTPATIENT
Start: 2022-10-05 | End: 2022-10-05

## 2022-10-05 RX ADMIN — Medication 81 MILLIGRAM(S): at 11:36

## 2022-10-05 RX ADMIN — Medication 1 PATCH: at 11:22

## 2022-10-05 RX ADMIN — SACUBITRIL AND VALSARTAN 1 TABLET(S): 24; 26 TABLET, FILM COATED ORAL at 18:06

## 2022-10-05 RX ADMIN — MUPIROCIN 1 APPLICATION(S): 20 OINTMENT TOPICAL at 11:38

## 2022-10-05 RX ADMIN — SACUBITRIL AND VALSARTAN 1 TABLET(S): 24; 26 TABLET, FILM COATED ORAL at 05:16

## 2022-10-05 RX ADMIN — Medication 50 MILLIGRAM(S): at 05:16

## 2022-10-05 RX ADMIN — HEPARIN SODIUM 5000 UNIT(S): 5000 INJECTION INTRAVENOUS; SUBCUTANEOUS at 05:15

## 2022-10-05 RX ADMIN — Medication 20 MILLIEQUIVALENT(S): at 17:50

## 2022-10-05 RX ADMIN — HEPARIN SODIUM 5000 UNIT(S): 5000 INJECTION INTRAVENOUS; SUBCUTANEOUS at 14:22

## 2022-10-05 RX ADMIN — PANTOPRAZOLE SODIUM 40 MILLIGRAM(S): 20 TABLET, DELAYED RELEASE ORAL at 05:15

## 2022-10-05 RX ADMIN — Medication 650 MILLIGRAM(S): at 22:45

## 2022-10-05 RX ADMIN — INSULIN GLARGINE 40 UNIT(S): 100 INJECTION, SOLUTION SUBCUTANEOUS at 21:35

## 2022-10-05 RX ADMIN — CLOPIDOGREL BISULFATE 75 MILLIGRAM(S): 75 TABLET, FILM COATED ORAL at 11:36

## 2022-10-05 RX ADMIN — Medication 1 PATCH: at 18:44

## 2022-10-05 RX ADMIN — Medication 650 MILLIGRAM(S): at 21:40

## 2022-10-05 RX ADMIN — HEPARIN SODIUM 5000 UNIT(S): 5000 INJECTION INTRAVENOUS; SUBCUTANEOUS at 21:35

## 2022-10-05 RX ADMIN — Medication 2: at 12:34

## 2022-10-05 RX ADMIN — TAMSULOSIN HYDROCHLORIDE 0.4 MILLIGRAM(S): 0.4 CAPSULE ORAL at 21:35

## 2022-10-05 RX ADMIN — Medication 1 PATCH: at 07:00

## 2022-10-05 RX ADMIN — ATORVASTATIN CALCIUM 80 MILLIGRAM(S): 80 TABLET, FILM COATED ORAL at 21:35

## 2022-10-05 RX ADMIN — Medication 1 PATCH: at 11:36

## 2022-10-05 RX ADMIN — BUMETANIDE 2 MILLIGRAM(S): 0.25 INJECTION INTRAMUSCULAR; INTRAVENOUS at 05:15

## 2022-10-05 RX ADMIN — SPIRONOLACTONE 25 MILLIGRAM(S): 25 TABLET, FILM COATED ORAL at 05:15

## 2022-10-05 RX ADMIN — Medication 2: at 17:49

## 2022-10-05 RX ADMIN — Medication 40 MILLIEQUIVALENT(S): at 13:45

## 2022-10-05 RX ADMIN — Medication 650 MILLIGRAM(S): at 00:20

## 2022-10-05 RX ADMIN — BUMETANIDE 2 MILLIGRAM(S): 0.25 INJECTION INTRAMUSCULAR; INTRAVENOUS at 14:22

## 2022-10-05 NOTE — PROGRESS NOTE ADULT - SUBJECTIVE AND OBJECTIVE BOX
Subjective: feels well and eager to go home.     Medications:  acetaminophen     Tablet .. 650 milliGRAM(s) Oral every 6 hours PRN  ALBUTerol    90 MICROgram(s) HFA Inhaler 1 Puff(s) Inhalation three times a day PRN  aspirin  chewable 81 milliGRAM(s) Oral daily  atorvastatin 80 milliGRAM(s) Oral at bedtime  buMETAnide Injectable 2 milliGRAM(s) IV Push two times a day  clopidogrel Tablet 75 milliGRAM(s) Oral daily  dextrose 5%. 1000 milliLiter(s) IV Continuous <Continuous>  dextrose 5%. 1000 milliLiter(s) IV Continuous <Continuous>  dextrose 50% Injectable 25 Gram(s) IV Push once  dextrose 50% Injectable 12.5 Gram(s) IV Push once  dextrose 50% Injectable 25 Gram(s) IV Push once  dextrose Oral Gel 15 Gram(s) Oral once PRN  glucagon  Injectable 1 milliGRAM(s) IntraMuscular once  heparin   Injectable 5000 Unit(s) SubCutaneous every 8 hours  insulin glargine Injectable (LANTUS) 40 Unit(s) SubCutaneous at bedtime  insulin lispro (ADMELOG) corrective regimen sliding scale   SubCutaneous three times a day before meals  metoprolol succinate ER 50 milliGRAM(s) Oral daily  mupirocin 2% Ointment 1 Application(s) Topical <User Schedule>  nicotine - 21 mG/24Hr(s) Patch 1 Patch Transdermal daily  pantoprazole    Tablet 40 milliGRAM(s) Oral before breakfast  potassium chloride    Tablet ER 40 milliEquivalent(s) Oral once  potassium chloride    Tablet ER 20 milliEquivalent(s) Oral once  sacubitril 24 mG/valsartan 26 mG 1 Tablet(s) Oral two times a day  spironolactone 25 milliGRAM(s) Oral daily  tamsulosin 0.4 milliGRAM(s) Oral at bedtime      Vitals:  Vital Signs Last 24 Hours  T(C): 36.6 (10-05-22 @ 04:39), Max: 36.8 (10-04-22 @ 22:03)  HR: 105 (10-05-22 @ 05:31) (105 - 111)  BP: 111/68 (10-05-22 @ 05:31) (95/53 - 123/68)  RR: 18 (10-05-22 @ 04:39) (18 - 19)  SpO2: 94% (10-05-22 @ 04:39) (94% - 94%)    Weight in k.1 (10-05 @ 07:59)    I&O's Summary    04 Oct 2022 07:01  -  05 Oct 2022 07:00  --------------------------------------------------------  IN: 360 mL / OUT: 2800 mL / NET: -2440 mL    05 Oct 2022 07:01  -  05 Oct 2022 13:07  --------------------------------------------------------  IN: 0 mL / OUT: 800 mL / NET: -800 mL      Physical Exam  General: No distress. Comfortable.  Neck: Neck supple. JVP not elevated. No masses  Chest: Clear to auscultation bilaterally  CV: Normal S1 and S2. No murmurs, rub, or gallops. Radial pulses normal.  Abdomen: Soft, non-distended, non-tender  Extremities: warm and well perfused, 1+ edema noted b/l   Neurology: Alert and oriented times three      Labs:                        10.4   6.98  )-----------( 215      ( 04 Oct 2022 05:42 )             35.2     10-05    140  |  99  |  37<H>  ----------------------------<  151<H>  3.4<L>   |  27  |  1.65<H>    Ca    9.1      05 Oct 2022 06:24  Phos  3.7     10-05  Mg     2.1     10-05            Serum Pro-Brain Natriuretic Peptide: 2777 pg/mL ( @ 13:45)

## 2022-10-05 NOTE — PROGRESS NOTE ADULT - PROBLEM SELECTOR PLAN 1
Worsening LE edema with mild R pleural effusion. Not hypoxic. Trigger unclear (possibly 2/2 to dietary indescretion)  D/C Bumex drip as per renal   Cards following  Bumex iv

## 2022-10-05 NOTE — PROGRESS NOTE ADULT - ASSESSMENT
61yoM with PMHx of HTN, CAD s/p stents, HFrEF s/p AICD, T2DM on insulin, osteo of L toe s/p amputation, recent tobacco use (quit 1mo prior to presentation), who presented with several weeks of lower extremity edema, orthopnea, and abdominal distension. He was initially on a Bumex gtt and has been transitioned of IV diuretics. Thus far he has lost ~20 pounds. His MANJIT is likely related to congestion. His volume status is overall improving though he still has pitting edema of his lower extremities. Hopeful for discharge home within the next day or two.

## 2022-10-05 NOTE — PROGRESS NOTE ADULT - ASSESSMENT
61 year old Male with PHSx HTN, CAD s/p stents, HFrEF s/p AICD, T2DM on insulin, OM of L toe s/p amputation, He presents to ED with worsening LE edema for 1 week.       1- CKD III  2- CHF  3- T2DM  4- anemia  5- hypokalemia      patient states baseline creatinine range 2.0-2.3 g/dL  renal sono reviewed from 7/2022 right moderate to severe chronic hydronephrosis   fluid status improving,   non-oliguric,   bumex 2mg IVP bid  entresto as above   trend K+  aldactone 25 mg daily  anemia, iron deficiency   iron supplement   trend hgb  strict I/O  keep O>I  monitor daily standing weight

## 2022-10-05 NOTE — PROGRESS NOTE ADULT - SUBJECTIVE AND OBJECTIVE BOX
[facility]   [phone]  RENAL FOLLOW UP NOTE  --------------------------------------------------------------------------------  Chief Complaint:    24 hour events/subjective:    seen earlier   states breathing is better and urinating well     PAST HISTORY  --------------------------------------------------------------------------------  No significant changes to PMH, PSH, FHx, SHx, unless otherwise noted    ALLERGIES & MEDICATIONS  --------------------------------------------------------------------------------  Allergies    No Known Allergies    Intolerances      Standing Inpatient Medications  aspirin  chewable 81 milliGRAM(s) Oral daily  atorvastatin 80 milliGRAM(s) Oral at bedtime  buMETAnide Injectable 2 milliGRAM(s) IV Push two times a day  clopidogrel Tablet 75 milliGRAM(s) Oral daily  dextrose 5%. 1000 milliLiter(s) IV Continuous <Continuous>  dextrose 5%. 1000 milliLiter(s) IV Continuous <Continuous>  dextrose 50% Injectable 25 Gram(s) IV Push once  dextrose 50% Injectable 12.5 Gram(s) IV Push once  dextrose 50% Injectable 25 Gram(s) IV Push once  glucagon  Injectable 1 milliGRAM(s) IntraMuscular once  heparin   Injectable 5000 Unit(s) SubCutaneous every 8 hours  insulin glargine Injectable (LANTUS) 40 Unit(s) SubCutaneous at bedtime  insulin lispro (ADMELOG) corrective regimen sliding scale   SubCutaneous three times a day before meals  metoprolol succinate ER 50 milliGRAM(s) Oral daily  mupirocin 2% Ointment 1 Application(s) Topical <User Schedule>  nicotine - 21 mG/24Hr(s) Patch 1 Patch Transdermal daily  pantoprazole    Tablet 40 milliGRAM(s) Oral before breakfast  sacubitril 24 mG/valsartan 26 mG 1 Tablet(s) Oral two times a day  spironolactone 25 milliGRAM(s) Oral daily  tamsulosin 0.4 milliGRAM(s) Oral at bedtime    PRN Inpatient Medications  acetaminophen     Tablet .. 650 milliGRAM(s) Oral every 6 hours PRN  ALBUTerol    90 MICROgram(s) HFA Inhaler 1 Puff(s) Inhalation three times a day PRN  dextrose Oral Gel 15 Gram(s) Oral once PRN      REVIEW OF SYSTEMS  --------------------------------------------------------------------------------    Gen: denies  fevers/chills,  CVS: denies chest pain/palpitations  Resp: denies SOB/Cough  GI: Denies N/V/Abd pain  : Denies dysuria/oliguria/hematuria        VITALS/PHYSICAL EXAM  --------------------------------------------------------------------------------  T(C): 36.7 (10-05-22 @ 21:22), Max: 36.8 (10-04-22 @ 22:03)  HR: 109 (10-05-22 @ 21:22) (104 - 111)  BP: 104/69 (10-05-22 @ 21:22) (98/65 - 123/68)  RR: 17 (10-05-22 @ 21:22) (17 - 19)  SpO2: 97% (10-05-22 @ 21:22) (94% - 97%)  Wt(kg): --    Weight (kg): 114.1 (10-05-22 @ 07:59)      10-04-22 @ 07:01  -  10-05-22 @ 07:00  --------------------------------------------------------  IN: 360 mL / OUT: 2800 mL / NET: -2440 mL    10-05-22 @ 07:01  -  10-05-22 @ 21:50  --------------------------------------------------------  IN: 480 mL / OUT: 3700 mL / NET: -3220 mL      Physical Exam:  	        Gen: Appears comfortable   	Pulm: decrease bs, no rales, ronchi or wheezing  	CV: +Mild JVD. RRR, S1S2; no rub  	Abd: +BS, soft, nontender/+distended, +ascites, improving  	: No suprapubic tenderness  	UE: Warm, no cyanosis  no clubbing,  no edema;  	LE: Warm, no cyanosis  no clubbing, B/L  2-  edema up to thigh improving  	Skin: Warm, no decrease skin turgor, chronic venous stasis changes, tattoos      LABS/STUDIES  --------------------------------------------------------------------------------              10.4   6.98  >-----------<  215      [10-04-22 @ 05:42]              35.2     140  |  99  |  37  ----------------------------<  151      [10-05-22 @ 06:24]  3.4   |  27  |  1.65        Ca     9.1     [10-05-22 @ 06:24]      Mg     2.1     [10-05-22 @ 06:24]      Phos  3.7     [10-05-22 @ 06:24]            Creatinine Trend:  SCr 1.65 [10-05 @ 06:24]  SCr 1.64 [10-04 @ 05:42]  SCr 1.55 [10-03 @ 18:44]  SCr 1.70 [10-03 @ 06:28]  SCr 1.63 [10-02 @ 18:15]              Urinalysis - [10-03-22 @ 15:27]      Color Colorless / Appearance Clear / SG 1.008 / pH 6.0      Gluc Negative / Ketone Negative  / Bili Negative / Urobili Negative       Blood Negative / Protein Trace / Leuk Est Negative / Nitrite Negative      RBC  / WBC  / Hyaline  / Gran  / Sq Epi  / Non Sq Epi  / Bacteria     Urine Creatinine 30      [10-03-22 @ 15:43]  Urine Protein 13      [10-03-22 @ 15:43]    Iron 33, TIBC 390, %sat 9      [10-02-22 @ 07:03]  Ferritin 86      [10-02-22 @ 07:04]  PTH -- (Ca 8.9)      [10-02-22 @ 07:04]   73  HbA1c 8.9      [12-16-19 @ 08:15]  TSH 3.02      [09-30-22 @ 13:45]

## 2022-10-05 NOTE — PROGRESS NOTE ADULT - SUBJECTIVE AND OBJECTIVE BOX
Patient is a 61y old  Male who presents with a chief complaint of LE swelling (01 Oct 2022 13:42)      SUBJECTIVE / OVERNIGHT EVENTS: no events over night     T(C): 36.7 (10-04-22 @ 11:20), Max: 36.9 (10-04-22 @ 04:44)  HR: 109 (10-04-22 @ 13:28) (103 - 109)  BP: 98/62 (10-04-22 @ 13:28) (98/62 - 114/72)  RR: 18 (10-04-22 @ 11:20) (18 - 18)  SpO2: 95% (10-04-22 @ 11:20) (95% - 96%)    MEDICATIONS  (STANDING):  aspirin  chewable 81 milliGRAM(s) Oral daily  atorvastatin 80 milliGRAM(s) Oral at bedtime  buMETAnide Injectable 2 milliGRAM(s) IV Push two times a day  clopidogrel Tablet 75 milliGRAM(s) Oral daily  dextrose 5%. 1000 milliLiter(s) (50 mL/Hr) IV Continuous <Continuous>  dextrose 5%. 1000 milliLiter(s) (100 mL/Hr) IV Continuous <Continuous>  dextrose 50% Injectable 25 Gram(s) IV Push once  dextrose 50% Injectable 12.5 Gram(s) IV Push once  dextrose 50% Injectable 25 Gram(s) IV Push once  glucagon  Injectable 1 milliGRAM(s) IntraMuscular once  heparin   Injectable 5000 Unit(s) SubCutaneous every 8 hours  insulin glargine Injectable (LANTUS) 40 Unit(s) SubCutaneous at bedtime  insulin lispro (ADMELOG) corrective regimen sliding scale   SubCutaneous three times a day before meals  metoprolol succinate ER 50 milliGRAM(s) Oral daily  mupirocin 2% Ointment 1 Application(s) Topical <User Schedule>  nicotine - 21 mG/24Hr(s) Patch 1 Patch Transdermal daily  pantoprazole    Tablet 40 milliGRAM(s) Oral before breakfast  sacubitril 24 mG/valsartan 26 mG 1 Tablet(s) Oral two times a day  spironolactone 25 milliGRAM(s) Oral daily  tamsulosin 0.4 milliGRAM(s) Oral at bedtime    MEDICATIONS  (PRN):  acetaminophen     Tablet .. 650 milliGRAM(s) Oral every 6 hours PRN Mild Pain (1 - 3), Moderate Pain (4 - 6)  ALBUTerol    90 MICROgram(s) HFA Inhaler 1 Puff(s) Inhalation three times a day PRN Shortness of Breath and/or Wheezing  dextrose Oral Gel 15 Gram(s) Oral once PRN Blood Glucose LESS THAN 70 milliGRAM(s)/deciliter          PHYSICAL EXAM:  GENERAL: NAD, well-developed  HEAD:  Atraumatic, Normocephalic  EYES: EOMI,  conjunctiva and sclera clear  NECK: Supple, No JVD  CHEST/LUNG: Clear to auscultation bilaterally; No wheeze  HEART: Regular rate and rhythm; No murmurs, rubs, or gallops  ABDOMEN: Soft, Nontender, Nondistended; Bowel sounds present  EXTREMITIES:  2 +edema   PSYCH: AAOx3  NEUROLOGY: non-focal  SKIN: No rashes or lesions                           10.4   6.98  )-----------( 215      ( 04 Oct 2022 05:42 )             35.2               138|96|41<190  3.5|27|1.64  9.2,1.8,--  10-04 @ 05:42  139|97|37<153  3.5|26|1.55  9.4,1.8,--  10-03 @ 18:44      CAPILLARY BLOOD GLUCOSE      POCT Blood Glucose.: 160 mg/dL (04 Oct 2022 11:08)  POCT Blood Glucose.: 151 mg/dL (04 Oct 2022 08:11)  POCT Blood Glucose.: 222 mg/dL (03 Oct 2022 21:43)  POCT Blood Glucose.: 160 mg/dL (03 Oct 2022 17:23)    Imaging Personally Reviewed:    Consultant(s) Notes Reviewed:      Care Discussed with Consultants/Other Providers:

## 2022-10-05 NOTE — CHART NOTE - NSCHARTNOTEFT_GEN_A_CORE
Notified by RN that patient had 11 beats of WCT.  Patient is asymptomatic.  VSS.  Electrolytes checked.  HF made aware.  Will continue to monitor.  No intervention warranted at this time.     Isabell Casey PA-C

## 2022-10-05 NOTE — PROGRESS NOTE ADULT - PROBLEM SELECTOR PLAN 1
- volume status overall improving though remains overloaded. Continue with Bumex 2 mg IV BID   - continue Toprol 50 mg PO QD and Entresto 24-26 mg BID  - Continue Aldactone 25mg daily  - plan to start Farxiga once off IV diuretics   - Daily standing weights, strict I+Os  - Check daily lactate, LFTs   - Plan to repeat TTE once euvolemic   - Keep K > 4, Mag > 2  - Device: [employer] subcutaneous ICD - volume status overall improving though remains overloaded. Continue with Bumex 2 mg IV BID   - continue Toprol 50 mg PO QD and Entresto 24-26 mg BID  - Continue Aldactone 25mg daily  - plan to start Farxiga once off IV diuretics   - Daily standing weights, strict I+Os  - Check daily lactate, LFTs   - Plan to repeat TTE once euvolemic   - Keep K > 4, Mag > 2  - Device: [employer] subcutaneous ICD  - Follow up appointment has been arranged for Tuesday 10/18 @ 2:00pm with heart failure NP

## 2022-10-06 LAB
ANION GAP SERPL CALC-SCNC: 13 MMOL/L — SIGNIFICANT CHANGE UP (ref 5–17)
ANION GAP SERPL CALC-SCNC: 13 MMOL/L — SIGNIFICANT CHANGE UP (ref 5–17)
BUN SERPL-MCNC: 36 MG/DL — HIGH (ref 7–23)
BUN SERPL-MCNC: 37 MG/DL — HIGH (ref 7–23)
CALCIUM SERPL-MCNC: 9.3 MG/DL — SIGNIFICANT CHANGE UP (ref 8.4–10.5)
CALCIUM SERPL-MCNC: 9.3 MG/DL — SIGNIFICANT CHANGE UP (ref 8.4–10.5)
CHLORIDE SERPL-SCNC: 100 MMOL/L — SIGNIFICANT CHANGE UP (ref 96–108)
CHLORIDE SERPL-SCNC: 97 MMOL/L — SIGNIFICANT CHANGE UP (ref 96–108)
CO2 SERPL-SCNC: 27 MMOL/L — SIGNIFICANT CHANGE UP (ref 22–31)
CO2 SERPL-SCNC: 28 MMOL/L — SIGNIFICANT CHANGE UP (ref 22–31)
CREAT SERPL-MCNC: 1.56 MG/DL — HIGH (ref 0.5–1.3)
CREAT SERPL-MCNC: 1.93 MG/DL — HIGH (ref 0.5–1.3)
EGFR: 39 ML/MIN/1.73M2 — LOW
EGFR: 51 ML/MIN/1.73M2 — LOW
GLUCOSE BLDC GLUCOMTR-MCNC: 135 MG/DL — HIGH (ref 70–99)
GLUCOSE BLDC GLUCOMTR-MCNC: 168 MG/DL — HIGH (ref 70–99)
GLUCOSE BLDC GLUCOMTR-MCNC: 213 MG/DL — HIGH (ref 70–99)
GLUCOSE BLDC GLUCOMTR-MCNC: 217 MG/DL — HIGH (ref 70–99)
GLUCOSE SERPL-MCNC: 142 MG/DL — HIGH (ref 70–99)
GLUCOSE SERPL-MCNC: 181 MG/DL — HIGH (ref 70–99)
MAGNESIUM SERPL-MCNC: 2 MG/DL — SIGNIFICANT CHANGE UP (ref 1.6–2.6)
POTASSIUM SERPL-MCNC: 3.6 MMOL/L — SIGNIFICANT CHANGE UP (ref 3.5–5.3)
POTASSIUM SERPL-MCNC: 4.3 MMOL/L — SIGNIFICANT CHANGE UP (ref 3.5–5.3)
POTASSIUM SERPL-SCNC: 3.6 MMOL/L — SIGNIFICANT CHANGE UP (ref 3.5–5.3)
POTASSIUM SERPL-SCNC: 4.3 MMOL/L — SIGNIFICANT CHANGE UP (ref 3.5–5.3)
SODIUM SERPL-SCNC: 138 MMOL/L — SIGNIFICANT CHANGE UP (ref 135–145)
SODIUM SERPL-SCNC: 140 MMOL/L — SIGNIFICANT CHANGE UP (ref 135–145)

## 2022-10-06 PROCEDURE — 99233 SBSQ HOSP IP/OBS HIGH 50: CPT

## 2022-10-06 PROCEDURE — 93010 ELECTROCARDIOGRAM REPORT: CPT

## 2022-10-06 RX ORDER — METOPROLOL TARTRATE 50 MG
75 TABLET ORAL DAILY
Refills: 0 | Status: DISCONTINUED | OUTPATIENT
Start: 2022-10-07 | End: 2022-10-07

## 2022-10-06 RX ORDER — SPIRONOLACTONE 25 MG/1
50 TABLET, FILM COATED ORAL DAILY
Refills: 0 | Status: DISCONTINUED | OUTPATIENT
Start: 2022-10-07 | End: 2022-10-07

## 2022-10-06 RX ORDER — POTASSIUM CHLORIDE 20 MEQ
40 PACKET (EA) ORAL ONCE
Refills: 0 | Status: COMPLETED | OUTPATIENT
Start: 2022-10-06 | End: 2022-10-06

## 2022-10-06 RX ORDER — BUMETANIDE 0.25 MG/ML
2 INJECTION INTRAMUSCULAR; INTRAVENOUS
Refills: 0 | Status: DISCONTINUED | OUTPATIENT
Start: 2022-10-06 | End: 2022-10-07

## 2022-10-06 RX ADMIN — PANTOPRAZOLE SODIUM 40 MILLIGRAM(S): 20 TABLET, DELAYED RELEASE ORAL at 05:43

## 2022-10-06 RX ADMIN — SPIRONOLACTONE 25 MILLIGRAM(S): 25 TABLET, FILM COATED ORAL at 05:43

## 2022-10-06 RX ADMIN — BUMETANIDE 2 MILLIGRAM(S): 0.25 INJECTION INTRAMUSCULAR; INTRAVENOUS at 05:43

## 2022-10-06 RX ADMIN — INSULIN GLARGINE 40 UNIT(S): 100 INJECTION, SOLUTION SUBCUTANEOUS at 21:50

## 2022-10-06 RX ADMIN — Medication 1 PATCH: at 07:00

## 2022-10-06 RX ADMIN — TAMSULOSIN HYDROCHLORIDE 0.4 MILLIGRAM(S): 0.4 CAPSULE ORAL at 21:49

## 2022-10-06 RX ADMIN — CLOPIDOGREL BISULFATE 75 MILLIGRAM(S): 75 TABLET, FILM COATED ORAL at 12:14

## 2022-10-06 RX ADMIN — HEPARIN SODIUM 5000 UNIT(S): 5000 INJECTION INTRAVENOUS; SUBCUTANEOUS at 12:13

## 2022-10-06 RX ADMIN — Medication 2: at 17:16

## 2022-10-06 RX ADMIN — Medication 50 MILLIGRAM(S): at 05:43

## 2022-10-06 RX ADMIN — Medication 1 PATCH: at 14:43

## 2022-10-06 RX ADMIN — Medication 4: at 12:13

## 2022-10-06 RX ADMIN — MUPIROCIN 1 APPLICATION(S): 20 OINTMENT TOPICAL at 09:05

## 2022-10-06 RX ADMIN — SACUBITRIL AND VALSARTAN 1 TABLET(S): 24; 26 TABLET, FILM COATED ORAL at 17:16

## 2022-10-06 RX ADMIN — SACUBITRIL AND VALSARTAN 1 TABLET(S): 24; 26 TABLET, FILM COATED ORAL at 05:44

## 2022-10-06 RX ADMIN — Medication 81 MILLIGRAM(S): at 12:14

## 2022-10-06 RX ADMIN — Medication 40 MILLIEQUIVALENT(S): at 09:25

## 2022-10-06 RX ADMIN — HEPARIN SODIUM 5000 UNIT(S): 5000 INJECTION INTRAVENOUS; SUBCUTANEOUS at 21:49

## 2022-10-06 RX ADMIN — BUMETANIDE 2 MILLIGRAM(S): 0.25 INJECTION INTRAMUSCULAR; INTRAVENOUS at 12:14

## 2022-10-06 RX ADMIN — HEPARIN SODIUM 5000 UNIT(S): 5000 INJECTION INTRAVENOUS; SUBCUTANEOUS at 05:44

## 2022-10-06 RX ADMIN — ATORVASTATIN CALCIUM 80 MILLIGRAM(S): 80 TABLET, FILM COATED ORAL at 21:49

## 2022-10-06 RX ADMIN — BUMETANIDE 2 MILLIGRAM(S): 0.25 INJECTION INTRAMUSCULAR; INTRAVENOUS at 17:16

## 2022-10-06 NOTE — CHART NOTE - NSCHARTNOTESELECT_GEN_ALL_CORE
11 Beats WCT/Event Note
Pain  and  increased freq  urination/Event Note
EP/Event Note
Podiatry Resident/Event Note

## 2022-10-06 NOTE — PROGRESS NOTE ADULT - PROBLEM SELECTOR PLAN 1
Worsening LE edema with mild R pleural effusion. Not hypoxic. Trigger unclear (possibly 2/2 to dietary indescretion)  D/C Bumex drip as per renal   Cards following  Bumex iv change to PO     D/C planning

## 2022-10-06 NOTE — PROGRESS NOTE ADULT - ASSESSMENT
62 yo M with PMHx of HTN, CAD s/p stents, HFrEF s/p AICD, T2DM on insulin, osteo of L toe s/p amputation, recent tobacco use (quit 1mo prior to presentation), who presented with several weeks of lower extremity edema, orthopnea, and abdominal distension. He was initially on a Bumex gtt and has been transitioned of IV diuretics. Thus far he has lost ~20 pounds. However, he remains volume overloaded but is responding well to current dose of IV diuretics. His BUN/Cr are stable. He is tachycardic on low dose beta blockers and low normotensive.

## 2022-10-06 NOTE — PROGRESS NOTE ADULT - SUBJECTIVE AND OBJECTIVE BOX
[facility]   [phone]  RENAL FOLLOW UP NOTE  --------------------------------------------------------------------------------  Chief Complaint:    24 hour events/subjective:    patient seen and examined,   denies sob    PAST HISTORY  --------------------------------------------------------------------------------  No significant changes to PMH, PSH, FHx, SHx, unless otherwise noted    ALLERGIES & MEDICATIONS  --------------------------------------------------------------------------------  Allergies    No Known Allergies    Intolerances      Standing Inpatient Medications  aspirin  chewable 81 milliGRAM(s) Oral daily  atorvastatin 80 milliGRAM(s) Oral at bedtime  buMETAnide 2 milliGRAM(s) Oral two times a day  clopidogrel Tablet 75 milliGRAM(s) Oral daily  dextrose 5%. 1000 milliLiter(s) IV Continuous <Continuous>  dextrose 5%. 1000 milliLiter(s) IV Continuous <Continuous>  dextrose 50% Injectable 25 Gram(s) IV Push once  dextrose 50% Injectable 12.5 Gram(s) IV Push once  dextrose 50% Injectable 25 Gram(s) IV Push once  glucagon  Injectable 1 milliGRAM(s) IntraMuscular once  heparin   Injectable 5000 Unit(s) SubCutaneous every 8 hours  insulin glargine Injectable (LANTUS) 40 Unit(s) SubCutaneous at bedtime  insulin lispro (ADMELOG) corrective regimen sliding scale   SubCutaneous three times a day before meals  mupirocin 2% Ointment 1 Application(s) Topical <User Schedule>  nicotine - 21 mG/24Hr(s) Patch 1 Patch Transdermal daily  pantoprazole    Tablet 40 milliGRAM(s) Oral before breakfast  sacubitril 24 mG/valsartan 26 mG 1 Tablet(s) Oral two times a day  tamsulosin 0.4 milliGRAM(s) Oral at bedtime    PRN Inpatient Medications  acetaminophen     Tablet .. 650 milliGRAM(s) Oral every 6 hours PRN  ALBUTerol    90 MICROgram(s) HFA Inhaler 1 Puff(s) Inhalation three times a day PRN  dextrose Oral Gel 15 Gram(s) Oral once PRN      REVIEW OF SYSTEMS  --------------------------------------------------------------------------------    Gen: denies fevers/chills,  CVS: denies chest pain/palpitations  Resp: denies SOB/Cough  GI: Denies N/V/Abd pain  : Denies dysuria/oliguria/hematuria    VITALS/PHYSICAL EXAM  --------------------------------------------------------------------------------  T(C): 36.4 (10-06-22 @ 12:28), Max: 36.7 (10-05-22 @ 21:22)  HR: 99 (10-06-22 @ 12:28) (99 - 109)  BP: 96/62 (10-06-22 @ 12:28) (96/62 - 104/69)  RR: 18 (10-06-22 @ 12:28) (17 - 18)  SpO2: 97% (10-06-22 @ 12:28) (97% - 98%)  Wt(kg): --    Weight (kg): 114.1 (10-05-22 @ 07:59)      10-05-22 @ 07:01  -  10-06-22 @ 07:00  --------------------------------------------------------  IN: 480 mL / OUT: 4200 mL / NET: -3720 mL    10-06-22 @ 07:01  -  10-06-22 @ 13:27  --------------------------------------------------------  IN: 720 mL / OUT: 1400 mL / NET: -680 mL      Physical Exam:  	              Gen: Appears comfortable   	Pulm: decrease bs, no rales, ronchi or wheezing  	CV: +Mild JVD. RRR, S1S2; no rub  	Abd: +BS, soft, nontender/+distended, +ascites, improving  	: No suprapubic tenderness  	UE: Warm, no cyanosis  no clubbing,  no edema;  	LE: Warm, no cyanosis  no clubbing, B/L  2-  edema up to thigh improving  	Skin: Warm, no decrease skin turgor, chronic venous stasis changes, tattoos    LABS/STUDIES  --------------------------------------------------------------------------------    140  |  100  |  37  ----------------------------<  142      [10-06-22 @ 06:30]  3.6   |  27  |  1.56        Ca     9.3     [10-06-22 @ 06:30]      Mg     2.1     [10-05-22 @ 06:24]      Phos  3.7     [10-05-22 @ 06:24]            Creatinine Trend:  SCr 1.56 [10-06 @ 06:30]  SCr 1.65 [10-05 @ 06:24]  SCr 1.64 [10-04 @ 05:42]  SCr 1.55 [10-03 @ 18:44]  SCr 1.70 [10-03 @ 06:28]              Urinalysis - [10-03-22 @ 15:27]      Color Colorless / Appearance Clear / SG 1.008 / pH 6.0      Gluc Negative / Ketone Negative  / Bili Negative / Urobili Negative       Blood Negative / Protein Trace / Leuk Est Negative / Nitrite Negative      RBC  / WBC  / Hyaline  / Gran  / Sq Epi  / Non Sq Epi  / Bacteria     Urine Creatinine 30      [10-03-22 @ 15:43]  Urine Protein 13      [10-03-22 @ 15:43]    Iron 33, TIBC 390, %sat 9      [10-02-22 @ 07:03]  Ferritin 86      [10-02-22 @ 07:04]  PTH -- (Ca 8.9)      [10-02-22 @ 07:04]   73  HbA1c 8.9      [12-16-19 @ 08:15]  TSH 3.02      [09-30-22 @ 13:45]

## 2022-10-06 NOTE — CHART NOTE - NSCHARTNOTEFT_GEN_A_CORE
Patient with chronic R foot sub metatarsal 3 wound to dermis with no clinical signs of infection, has dependant rubor to his RLE.   - No surgical intervention from podiatry   - Wound care orders submitted   - Pt is stable for discharge from podiatry, f/u information in discharge note provider   - Please reconsult as necessary @ [phone]

## 2022-10-06 NOTE — PROGRESS NOTE ADULT - PROBLEM SELECTOR PLAN 1
- volume status overall improving though remains overloaded. Change bumex to 2mg PO BID starting 10/6 PM.   - increase Toprol to 75 mg PO QD and Entresto 24-26 mg BID, hold for SBP < 90  - Increase spironolactone to 50mg daily  - plan to start Farxiga in the next 1-2 days prior to discharge  - Daily standing weights, strict I+Os  - Check daily lactate, LFTs   - Plan to repeat TTE once euvolemic   - Keep K > 4, Mag > 2  - Device: [employer] subcutaneous ICD  - please check 12 lead EKG  - Follow up appointment has been arranged for Tuesday 10/18 @ 2:00pm with heart failure NP

## 2022-10-06 NOTE — PROGRESS NOTE ADULT - SUBJECTIVE AND OBJECTIVE BOX
Subjective:  - 11 beats of WCT overnight, asymptomatic  - LE edema improving. Denies SOB walking around nursing unit, orthopnea, CP, lightheadedness    Medications:  acetaminophen     Tablet .. 650 milliGRAM(s) Oral every 6 hours PRN  ALBUTerol    90 MICROgram(s) HFA Inhaler 1 Puff(s) Inhalation three times a day PRN  aspirin  chewable 81 milliGRAM(s) Oral daily  atorvastatin 80 milliGRAM(s) Oral at bedtime  buMETAnide 2 milliGRAM(s) Oral two times a day  clopidogrel Tablet 75 milliGRAM(s) Oral daily  dextrose 5%. 1000 milliLiter(s) IV Continuous <Continuous>  dextrose 5%. 1000 milliLiter(s) IV Continuous <Continuous>  dextrose 50% Injectable 25 Gram(s) IV Push once  dextrose 50% Injectable 12.5 Gram(s) IV Push once  dextrose 50% Injectable 25 Gram(s) IV Push once  dextrose Oral Gel 15 Gram(s) Oral once PRN  glucagon  Injectable 1 milliGRAM(s) IntraMuscular once  heparin   Injectable 5000 Unit(s) SubCutaneous every 8 hours  insulin glargine Injectable (LANTUS) 40 Unit(s) SubCutaneous at bedtime  insulin lispro (ADMELOG) corrective regimen sliding scale   SubCutaneous three times a day before meals  mupirocin 2% Ointment 1 Application(s) Topical <User Schedule>  nicotine - 21 mG/24Hr(s) Patch 1 Patch Transdermal daily  pantoprazole    Tablet 40 milliGRAM(s) Oral before breakfast  sacubitril 24 mG/valsartan 26 mG 1 Tablet(s) Oral two times a day  tamsulosin 0.4 milliGRAM(s) Oral at bedtime      Physical Exam:    Vitals:  Vital Signs Last 24 Hours  T(C): 36.4 (10-06-22 @ 12:28), Max: 36.7 (10-05-22 @ 21:22)  HR: 99 (10-06-22 @ 12:28) (99 - 109)  BP: 96/62 (10-06-22 @ 12:28) (96/62 - 104/69)  RR: 18 (10-06-22 @ 12:28) (17 - 18)  SpO2: 97% (10-06-22 @ 12:28) (97% - 98%)    Weight in k.4 (10-06 @ 05:40)    I&O's Summary    05 Oct 2022 07:01  -  06 Oct 2022 07:00  --------------------------------------------------------  IN: 480 mL / OUT: 4200 mL / NET: -3720 mL    06 Oct 2022 07:01  -  06 Oct 2022 15:48  --------------------------------------------------------  IN: 720 mL / OUT: 1400 mL / NET: -680 mL    Tele: ST    General: No distress. Comfortable.  HEENT: EOM intact.  Neck: Neck supple. JVP ~12cm H2O, positive HJR. No masses  Chest: Unlabored, RLL diminished otherwise clear to auscultation bilaterally  CV: Normal S1 and S2. No murmurs, rub, or gallops. Radial pulses normal. +2 tense LE edema to knees, improving  Abdomen: Soft, non-distended, non-tender  Skin: No rashes or skin breakdown  Neurology: Alert and oriented times three. Sensation intact  Psych: Affect normal    Labs:    10-06    140  |  100  |  37<H>  ----------------------------<  142<H>  3.6   |  27  |  1.56<H>    Ca    9.3      06 Oct 2022 06:30  Phos  3.7     10-05  Mg     2.1     10-05    Serum Pro-Brain Natriuretic Peptide: 2777 pg/mL ( @ 13:45)

## 2022-10-06 NOTE — PROGRESS NOTE ADULT - ASSESSMENT
61 year old Male with PHSx HTN, CAD s/p stents, HFrEF s/p AICD, T2DM on insulin, OM of L toe s/p amputation, He presents to ED with worsening LE edema for 1 week.       1- CKD III  2- CHF  3- T2DM  4- anemia  5- hypokalemia      patient states baseline creatinine range 2.0-2.3 g/dL  renal sono reviewed from 7/2022 right moderate to severe chronic hydronephrosis   creatinine steady  fluid status improving,   non-oliguric,   transition to bumex 2mg po BID  continue entresto as above   trend K+  aldactone 25 mg daily  anemia, iron deficiency   iron supplement   trend hgb  continue flomax  strict I/O  keep O>I  monitor daily standing weight

## 2022-10-06 NOTE — PROGRESS NOTE ADULT - SUBJECTIVE AND OBJECTIVE BOX
Subjective: Patient seen and examined. No new events except as noted.   Patient is feeling well and has now lost almost 22 pounds. now on entresto and bumex 2mg IV BID  He feels well and anxious to go home   creatinine is now up to 1.56 MEDICATIONS:  MEDICATIONS  (STANDING):  aspirin  chewable 81 milliGRAM(s) Oral daily  atorvastatin 80 milliGRAM(s) Oral at bedtime  clopidogrel Tablet 75 milliGRAM(s) Oral daily  dextrose 5%. 1000 milliLiter(s) (50 mL/Hr) IV Continuous <Continuous>  dextrose 5%. 1000 milliLiter(s) (100 mL/Hr) IV Continuous <Continuous>  dextrose 50% Injectable 25 Gram(s) IV Push once  dextrose 50% Injectable 12.5 Gram(s) IV Push once  dextrose 50% Injectable 25 Gram(s) IV Push once  glucagon  Injectable 1 milliGRAM(s) IntraMuscular once  heparin   Injectable 5000 Unit(s) SubCutaneous every 8 hours  insulin glargine Injectable (LANTUS) 40 Unit(s) SubCutaneous at bedtime  insulin lispro (ADMELOG) corrective regimen sliding scale   SubCutaneous three times a day before meals  metoprolol succinate ER 50 milliGRAM(s) Oral daily  mupirocin 2% Ointment 1 Application(s) Topical <User Schedule>  nicotine - 21 mG/24Hr(s) Patch 1 Patch Transdermal daily  pantoprazole    Tablet 40 milliGRAM(s) Oral before breakfast  spironolactone 25 milliGRAM(s) Oral daily  tamsulosin 0.4 milliGRAM(s) Oral at bedtime  bumex 2mg IV BID  entresto 24-26 BID    Vital Signs Last 24 Hrs  T(C): 36.4 (10-06-22 @ 12:28), Max: 36.7 (10-05-22 @ 21:22)  T(F): 97.5 (10-06-22 @ 12:28), Max: 98.1 (10-05-22 @ 21:22)  HR: 99 (10-06-22 @ 12:28) (99 - 109)  BP: 96/62 (10-06-22 @ 12:28) (96/62 - 104/69)  BP(mean): --  RR: 18 (10-06-22 @ 12:28) (17 - 18)  SpO2: 97% (10-06-22 @ 12:28) (97% - 98%)      Appearance: Normal	  Looks well in excellent spirits  HEENT:   Normal oral mucosa, PERRL, EOMI	  Cardiovascular: Normal S1 S2, No JVD, No murmurs ,  Respiratory: Lungs clear to auscultation, normal effort 	  Gastrointestinal:    less protuberant  Psychiatry:  Mood & affect appropriate  Ext:  trace 1+  edema with stasis changes of the lower  extremity        LABS:    CARDIAC MARKERS:        LABS:    CARDIAC MARKERS:            10-06    140  |  100  |  37<H>  ----------------------------<  142<H>  3.6   |  27  |  1.56<H>    Ca    9.3      06 Oct 2022 06:30  Phos  3.7     10-05  Mg     2.1     10-05      proBNP:   Lipid Profile:   HgA1c:   TSH:                 TELEMETRY: 	    ECG:  	NSR  RADIOLOGY:   DIAGNOSTIC TESTING:  < from: TTE with Doppler (w/Cont) (10.03.22 @ 13:30) >  Observations:  Mitral Valve: Tethered mitral valve leaflets with normal  opening. Mild mitral regurgitation.  Aortic Valve/Aorta: Normal trileaflet aortic valve. Peak  transaortic valve gradient equals 3 mm Hg, mean transaortic  valve gradient equals 2 mm Hg, aortic valve velocity time  integral equals 16 cm. Minimal aortic regurgitation. Peak  left ventricular outflow tract gradient equals 1 mm Hg,  mean gradientis equal to 2 mm Hg, LVOT velocity time  integral equals 9 cm.  Aortic Root: 3.3 cm.  Left Atrium: Normal left atrium.  LA volume index = 32  cc/m2.  Left Ventricle: Endocardial visualization enhanced with  intravenous injection of Ultrasonic Enhancing Agent  (Lumason). Severe global left ventricular systolic  dysfunction with regional variation. No left ventricular  thrombus. Moderate left ventricular enlargement.  Indeterminate diastolic function.    Right Heart: Normal right atrium. The right ventricle is  not well visualized; grossly normal right ventricular size  and systolic function. Tricuspid valve not well visualized,  probably normal. Mild tricuspid regurgitation. Normal  pulmonic valve. Minimal pulmonic regurgitation.  Pericardium/Pleura: Normal pericardium with no pericardial  Pericardium/Pleura: Normal pericardium with no pericardial  effusion.  Hemodynamic: Dilated IVC (diameterabout 2.6 cm) with less  than 50% respiratory variation in size consistent with  estimated RA pressure 15 mmHg. Estimated right ventricular  systolic pressure equals 56 mm Hg, assuming right atrial  pressure equals 15 mm Hg, consistent with moderate  pulmonary hypertension.  ------------------------------------------------------------------------  Conclusions:  1. Tethered mitral valve leaflets with normal opening. Mild  mitral regurgitation.  2. Normal trileaflet aortic valve. Minimal aortic  regurgitation.  3. Endocardial visualization enhanced with intravenous  injection of Ultrasonic Enhancing Agent (Lumason). Severe  global left ventricular systolicdysfunction with regional  variation. No left ventricular thrombus.  4. The right ventricle is not well visualized; grossly  normal right ventricular size and systolic function.  5. Estimated pulmonary artery systolic pressure equals 56  mm Hg, assuming right atrial pressure equals 15 mm Hg,  consistent with moderate pulmonary pressures.  *** Compared with echocardiogram of 9/9/2020, results are  similar on today's study. Estimated PA systolic pressure is  consistent with moderate pulmonary hypertenson on today's  study. Pulmonary pressure was not estimated on the prior

## 2022-10-06 NOTE — PROGRESS NOTE ADULT - SUBJECTIVE AND OBJECTIVE BOX
Patient is a 61y old  Male who presents with a chief complaint of LE swelling (01 Oct 2022 13:42)      SUBJECTIVE / OVERNIGHT EVENTS: no events over night     T(C): 36.8 (10-06-22 @ 19:58), Max: 36.8 (10-06-22 @ 19:58)  HR: 105 (10-06-22 @ 19:58) (99 - 105)  BP: 108/66 (10-06-22 @ 19:58) (96/62 - 110/72)  RR: 17 (10-06-22 @ 19:58) (17 - 18)  SpO2: 95% (10-06-22 @ 19:58) (95% - 97%)    MEDICATIONS  (STANDING):  aspirin  chewable 81 milliGRAM(s) Oral daily  atorvastatin 80 milliGRAM(s) Oral at bedtime  buMETAnide 2 milliGRAM(s) Oral two times a day  clopidogrel Tablet 75 milliGRAM(s) Oral daily  dextrose 5%. 1000 milliLiter(s) (50 mL/Hr) IV Continuous <Continuous>  dextrose 5%. 1000 milliLiter(s) (100 mL/Hr) IV Continuous <Continuous>  dextrose 50% Injectable 25 Gram(s) IV Push once  dextrose 50% Injectable 12.5 Gram(s) IV Push once  dextrose 50% Injectable 25 Gram(s) IV Push once  glucagon  Injectable 1 milliGRAM(s) IntraMuscular once  heparin   Injectable 5000 Unit(s) SubCutaneous every 8 hours  insulin glargine Injectable (LANTUS) 40 Unit(s) SubCutaneous at bedtime  insulin lispro (ADMELOG) corrective regimen sliding scale   SubCutaneous three times a day before meals  mupirocin 2% Ointment 1 Application(s) Topical <User Schedule>  nicotine - 21 mG/24Hr(s) Patch 1 Patch Transdermal daily  pantoprazole    Tablet 40 milliGRAM(s) Oral before breakfast  sacubitril 24 mG/valsartan 26 mG 1 Tablet(s) Oral two times a day  tamsulosin 0.4 milliGRAM(s) Oral at bedtime    MEDICATIONS  (PRN):  acetaminophen     Tablet .. 650 milliGRAM(s) Oral every 6 hours PRN Mild Pain (1 - 3), Moderate Pain (4 - 6)  ALBUTerol    90 MICROgram(s) HFA Inhaler 1 Puff(s) Inhalation three times a day PRN Shortness of Breath and/or Wheezing  dextrose Oral Gel 15 Gram(s) Oral once PRN Blood Glucose LESS THAN 70 milliGRAM(s)/deciliter        PHYSICAL EXAM:  GENERAL: NAD, well-developed  HEAD:  Atraumatic, Normocephalic  EYES: EOMI,  conjunctiva and sclera clear  NECK: Supple, No JVD  CHEST/LUNG: Clear to auscultation bilaterally; No wheeze  HEART: Regular rate and rhythm; No murmurs, rubs, or gallops  ABDOMEN: Soft, Nontender, Nondistended; Bowel sounds present  EXTREMITIES:  2 +edema   PSYCH: AAOx3  NEUROLOGY: non-focal  SKIN: No rashes or lesions                   138|97|36<181  4.3|28|1.93  9.3,2.0,--  10-06 @ 20:05  140|100|37<142  3.6|27|1.56  9.3,--,--  10-06 @ 06:30  CAPILLARY BLOOD GLUCOSE      POCT Blood Glucose.: 168 mg/dL (06 Oct 2022 17:00)  POCT Blood Glucose.: 213 mg/dL (06 Oct 2022 11:53)  POCT Blood Glucose.: 135 mg/dL (06 Oct 2022 08:47)  Imaging Personally Reviewed:    Consultant(s) Notes Reviewed:      Care Discussed with Consultants/Other Providers:

## 2022-10-06 NOTE — PROGRESS NOTE ADULT - ASSESSMENT
swith to PO diuretic in AM ?torsemidePatient has an ischemic cardiomyopathy with very low LVEF, now woth wsorsening sob, edema and increased abdominal girth Presently hemodynamically.  patient has lost almost 20 pounds and is feeling much better but is slightly tachycardic and creatinine has improved and overall sense of well being improved. anxious to go home  1. ischemic cardiomyoathy  2. acute on chronic CHF right greater than left  3. s/p stent to LCX  4.  LVEF 20%  5. CRI- improved  6. 22 pound weight loss    Recommend  apppreciate advanced heart failure input  would switch to PO didretic in AM ?torsemide in additon to other meds  discharge planning for october 7

## 2022-10-07 ENCOUNTER — TRANSCRIPTION ENCOUNTER (OUTPATIENT)
Age: 62
End: 2022-10-07

## 2022-10-07 VITALS
SYSTOLIC BLOOD PRESSURE: 95 MMHG | RESPIRATION RATE: 18 BRPM | OXYGEN SATURATION: 97 % | DIASTOLIC BLOOD PRESSURE: 67 MMHG | HEART RATE: 100 BPM

## 2022-10-07 LAB
ANION GAP SERPL CALC-SCNC: 11 MMOL/L — SIGNIFICANT CHANGE UP (ref 5–17)
BUN SERPL-MCNC: 42 MG/DL — HIGH (ref 7–23)
CALCIUM SERPL-MCNC: 9.5 MG/DL — SIGNIFICANT CHANGE UP (ref 8.4–10.5)
CHLORIDE SERPL-SCNC: 96 MMOL/L — SIGNIFICANT CHANGE UP (ref 96–108)
CO2 SERPL-SCNC: 29 MMOL/L — SIGNIFICANT CHANGE UP (ref 22–31)
CREAT SERPL-MCNC: 1.76 MG/DL — HIGH (ref 0.5–1.3)
EGFR: 44 ML/MIN/1.73M2 — LOW
GLUCOSE BLDC GLUCOMTR-MCNC: 151 MG/DL — HIGH (ref 70–99)
GLUCOSE BLDC GLUCOMTR-MCNC: 231 MG/DL — HIGH (ref 70–99)
GLUCOSE SERPL-MCNC: 239 MG/DL — HIGH (ref 70–99)
MAGNESIUM SERPL-MCNC: 2 MG/DL — SIGNIFICANT CHANGE UP (ref 1.6–2.6)
NT-PROBNP SERPL-SCNC: 1852 PG/ML — HIGH (ref 0–300)
POTASSIUM SERPL-MCNC: 4.2 MMOL/L — SIGNIFICANT CHANGE UP (ref 3.5–5.3)
POTASSIUM SERPL-SCNC: 4.2 MMOL/L — SIGNIFICANT CHANGE UP (ref 3.5–5.3)
SARS-COV-2 RNA SPEC QL NAA+PROBE: SIGNIFICANT CHANGE UP
SODIUM SERPL-SCNC: 136 MMOL/L — SIGNIFICANT CHANGE UP (ref 135–145)

## 2022-10-07 PROCEDURE — 99232 SBSQ HOSP IP/OBS MODERATE 35: CPT

## 2022-10-07 RX ORDER — ALBUTEROL 90 UG/1
2 AEROSOL, METERED ORAL
Qty: 0 | Refills: 0 | DISCHARGE

## 2022-10-07 RX ORDER — EMPAGLIFLOZIN 10 MG/1
1 TABLET, FILM COATED ORAL
Qty: 30 | Refills: 0
Start: 2022-10-07 | End: 2022-11-05

## 2022-10-07 RX ORDER — METOPROLOL TARTRATE 50 MG
3 TABLET ORAL
Qty: 90 | Refills: 0
Start: 2022-10-07 | End: 2022-11-05

## 2022-10-07 RX ORDER — SACUBITRIL AND VALSARTAN 24; 26 MG/1; MG/1
1 TABLET, FILM COATED ORAL
Qty: 60 | Refills: 0
Start: 2022-10-07 | End: 2022-11-05

## 2022-10-07 RX ORDER — DAPAGLIFLOZIN 10 MG/1
1 TABLET, FILM COATED ORAL
Qty: 30 | Refills: 0
Start: 2022-10-07 | End: 2022-11-05

## 2022-10-07 RX ORDER — ATORVASTATIN CALCIUM 80 MG/1
1 TABLET, FILM COATED ORAL
Qty: 0 | Refills: 0 | DISCHARGE
Start: 2022-10-07

## 2022-10-07 RX ORDER — BUMETANIDE 0.25 MG/ML
2 INJECTION INTRAMUSCULAR; INTRAVENOUS DAILY
Refills: 0 | Status: DISCONTINUED | OUTPATIENT
Start: 2022-10-08 | End: 2022-10-07

## 2022-10-07 RX ORDER — PANTOPRAZOLE SODIUM 20 MG/1
1 TABLET, DELAYED RELEASE ORAL
Qty: 0 | Refills: 0 | DISCHARGE
Start: 2022-10-07

## 2022-10-07 RX ORDER — TAMSULOSIN HYDROCHLORIDE 0.4 MG/1
1 CAPSULE ORAL
Qty: 0 | Refills: 0 | DISCHARGE
Start: 2022-10-07

## 2022-10-07 RX ORDER — CLOPIDOGREL BISULFATE 75 MG/1
1 TABLET, FILM COATED ORAL
Qty: 0 | Refills: 0 | DISCHARGE
Start: 2022-10-07

## 2022-10-07 RX ORDER — SPIRONOLACTONE 25 MG/1
2 TABLET, FILM COATED ORAL
Qty: 60 | Refills: 0
Start: 2022-10-07 | End: 2022-11-05

## 2022-10-07 RX ORDER — ASPIRIN/CALCIUM CARB/MAGNESIUM 324 MG
1 TABLET ORAL
Qty: 0 | Refills: 0 | DISCHARGE
Start: 2022-10-07

## 2022-10-07 RX ORDER — INSULIN GLARGINE 100 [IU]/ML
40 INJECTION, SOLUTION SUBCUTANEOUS
Qty: 0 | Refills: 0 | DISCHARGE
Start: 2022-10-07

## 2022-10-07 RX ORDER — BUMETANIDE 0.25 MG/ML
1 INJECTION INTRAMUSCULAR; INTRAVENOUS
Qty: 30 | Refills: 0
Start: 2022-10-07 | End: 2022-11-05

## 2022-10-07 RX ORDER — ROSUVASTATIN CALCIUM 5 MG/1
1 TABLET ORAL
Qty: 0 | Refills: 0 | DISCHARGE

## 2022-10-07 RX ORDER — SACUBITRIL AND VALSARTAN 24; 26 MG/1; MG/1
1 TABLET, FILM COATED ORAL
Qty: 0 | Refills: 0 | DISCHARGE
Start: 2022-10-07

## 2022-10-07 RX ORDER — ALBUTEROL 90 UG/1
1 AEROSOL, METERED ORAL
Qty: 0 | Refills: 0 | DISCHARGE
Start: 2022-10-07

## 2022-10-07 RX ORDER — FUROSEMIDE 40 MG
1 TABLET ORAL
Qty: 0 | Refills: 0 | DISCHARGE

## 2022-10-07 RX ORDER — PANTOPRAZOLE SODIUM 20 MG/1
1 TABLET, DELAYED RELEASE ORAL
Qty: 0 | Refills: 0 | DISCHARGE

## 2022-10-07 RX ORDER — SACUBITRIL AND VALSARTAN 24; 26 MG/1; MG/1
1 TABLET, FILM COATED ORAL
Qty: 0 | Refills: 0 | DISCHARGE

## 2022-10-07 RX ORDER — BUMETANIDE 0.25 MG/ML
1 INJECTION INTRAMUSCULAR; INTRAVENOUS
Qty: 0 | Refills: 0 | DISCHARGE
Start: 2022-10-07 | End: 2022-11-05

## 2022-10-07 RX ADMIN — Medication 4: at 12:23

## 2022-10-07 RX ADMIN — HEPARIN SODIUM 5000 UNIT(S): 5000 INJECTION INTRAVENOUS; SUBCUTANEOUS at 05:36

## 2022-10-07 RX ADMIN — SPIRONOLACTONE 50 MILLIGRAM(S): 25 TABLET, FILM COATED ORAL at 05:37

## 2022-10-07 RX ADMIN — CLOPIDOGREL BISULFATE 75 MILLIGRAM(S): 75 TABLET, FILM COATED ORAL at 10:57

## 2022-10-07 RX ADMIN — Medication 75 MILLIGRAM(S): at 05:44

## 2022-10-07 RX ADMIN — Medication 2: at 08:44

## 2022-10-07 RX ADMIN — BUMETANIDE 2 MILLIGRAM(S): 0.25 INJECTION INTRAMUSCULAR; INTRAVENOUS at 05:37

## 2022-10-07 RX ADMIN — PANTOPRAZOLE SODIUM 40 MILLIGRAM(S): 20 TABLET, DELAYED RELEASE ORAL at 05:37

## 2022-10-07 RX ADMIN — MUPIROCIN 1 APPLICATION(S): 20 OINTMENT TOPICAL at 08:49

## 2022-10-07 RX ADMIN — Medication 1 PATCH: at 10:57

## 2022-10-07 RX ADMIN — HEPARIN SODIUM 5000 UNIT(S): 5000 INJECTION INTRAVENOUS; SUBCUTANEOUS at 12:23

## 2022-10-07 RX ADMIN — Medication 81 MILLIGRAM(S): at 10:57

## 2022-10-07 RX ADMIN — SACUBITRIL AND VALSARTAN 1 TABLET(S): 24; 26 TABLET, FILM COATED ORAL at 05:45

## 2022-10-07 NOTE — PROGRESS NOTE ADULT - ASSESSMENT
60 yo M with PMHx of HTN, CAD s/p stents, HFrEF s/p AICD, T2DM on insulin, osteo of L toe s/p amputation, recent tobacco use (quit 1mo prior to presentation), who presented with several weeks of lower extremity edema, orthopnea, and abdominal distension. He was initially on a Bumex gtt and has been transitioned of IV diuretics. He is diuresing well with 24lb weight loss since admission. He is volume overloaded on exam but is diuresing well on oral diuretics with 3.8L UOP and 2kg weight loss over the past 24 hours. His Cr is downtrending. He's mildly tachycardic with borderline blood pressures but tolerating low dose GDMT. Weight today 248lbs. He's stable for discharge home with close outpatient follow up for reassessment of volume status, further optimization of medications and repeat labs.

## 2022-10-07 NOTE — PROGRESS NOTE ADULT - SUBJECTIVE AND OBJECTIVE BOX
Subjective: Patient seen and examined. No new events except as noted.   feels well anxious to go home  no sob less swelling  weight 248 pounds  MEDICATIONS:  MEDICATIONS  (STANDING):  aspirin  chewable 81 milliGRAM(s) Oral daily  atorvastatin 80 milliGRAM(s) Oral at bedtime  buMETAnide 2 milliGRAM(s) Oral two times a day  clopidogrel Tablet 75 milliGRAM(s) Oral daily  dextrose 5%. 1000 milliLiter(s) (50 mL/Hr) IV Continuous <Continuous>  dextrose 5%. 1000 milliLiter(s) (100 mL/Hr) IV Continuous <Continuous>  dextrose 50% Injectable 25 Gram(s) IV Push once  dextrose 50% Injectable 12.5 Gram(s) IV Push once  dextrose 50% Injectable 25 Gram(s) IV Push once  glucagon  Injectable 1 milliGRAM(s) IntraMuscular once  heparin   Injectable 5000 Unit(s) SubCutaneous every 8 hours  insulin glargine Injectable (LANTUS) 40 Unit(s) SubCutaneous at bedtime  insulin lispro (ADMELOG) corrective regimen sliding scale   SubCutaneous three times a day before meals  metoprolol succinate ER 75 milliGRAM(s) Oral daily  mupirocin 2% Ointment 1 Application(s) Topical <User Schedule>  nicotine - 21 mG/24Hr(s) Patch 1 Patch Transdermal daily  pantoprazole    Tablet 40 milliGRAM(s) Oral before breakfast  sacubitril 24 mG/valsartan 26 mG 1 Tablet(s) Oral two times a day  spironolactone 50 milliGRAM(s) Oral daily  tamsulosin 0.4 milliGRAM(s) Oral at bedtime      PHYSICAL EXAM:  T(C): 36.7 (10-07-22 @ 08:16), Max: 36.8 (10-06-22 @ 19:58)  HR: 100 (10-07-22 @ 08:16) (99 - 111)  BP: 91/61 (10-07-22 @ 08:16) (91/61 - 110/74)  RR: 17 (10-07-22 @ 08:16) (17 - 18)  SpO2: 96% (10-07-22 @ 08:16) (95% - 97%)  Wt(kg): --  I&O's Summary    06 Oct 2022 07:01  -  07 Oct 2022 07:00  --------------------------------------------------------  IN: 1140 mL / OUT: 3750 mL / NET: -2610 mL    07 Oct 2022 07:01  -  07 Oct 2022 10:49  --------------------------------------------------------  IN: 465 mL / OUT: 0 mL / NET: 465 mL          Appearance: Normal	  HEENT:   Normal oral mucosa, PERRL, EOMI	  Cardiovascular: Normal S1 S2, No JVD, No murmurs ,no s3  Respiratory: Lungs clear to auscultation, normal effort 	  Gastrointestinal:  Soft, Non-tender, + BS	less protuberant  Musculoskeletal: Normal range of motion, normal strength  Psychiatry:  Mood & affect appropriate  Ext: tasis changes trace edema        LABS:    CARDIAC MARKERS:            10-06    138  |  97  |  36<H>  ----------------------------<  181<H>  4.3   |  28  |  1.93<H>    Ca    9.3      06 Oct 2022 20:05  Mg     2.0     10-06      proBNP:   Lipid Profile:   HgA1c:   TSH:           TELEMETRY: 	    ECG:  	  RADIOLOGY:   DIAGNOSTIC TESTING:  [ ] Echocardiogram:  [ ]  Catheterization:  [ ] Stress Test:    OTHER:

## 2022-10-07 NOTE — PROGRESS NOTE ADULT - PROBLEM SELECTOR PLAN 1
- start jardiance 10mg daily (farxiga not covered)  - decrease bumex to 2mg PO daily  - continue Entresto 24-26mg BID  - Toprol XL 74mg daily  - spironolactone 50mg daily  - Daily standing weights, strict I+Os  - Check daily lactate, LFTs   - Plan to repeat TTE once euvolemic and optimized on HF GDMT outpatient  - Keep K > 4, Mag > 2  - Device: [employer] subcutaneous ICD  - Follow up appointment has been arranged for Tuesday 10/18 @ 2:00pm with heart failure NP (attending Dr. Ana Vanegas).   - Cardiomems discussed with pt and his wife to reduce risk of readmissions and for longitudinal outpatient management, which they're interested in. Will arrange as outpatient. - start jardiance 10mg daily (farxiga not covered)  - decrease bumex to 2mg PO daily  - continue Entresto 24-26mg BID  - Toprol XL 74mg daily  - spironolactone 50mg daily  - Daily standing weights, strict I+Os  - Check daily lactate, LFTs   - Plan to repeat TTE once euvolemic and optimized on HF GDMT outpatient  - Keep K > 4, Mag > 2  - Device: [employer] subcutaneous ICD  - Follow up appointment has been arranged for Tuesday 10/12 @ 9:30am with heart failure NP (attending Dr. Ana Vanegas).   - Cardiomems discussed with pt and his wife to reduce risk of readmissions and for longitudinal outpatient management, which they're interested in. Will arrange as outpatient.

## 2022-10-07 NOTE — DISCHARGE NOTE NURSING/CASE MANAGEMENT/SOCIAL WORK - NSDCVIVACCINE_GEN_ALL_CORE_FT
influenza, injectable, quadrivalent, preservative free; 08-Feb-2018 11:10; Chely Patterson (RN); [employer]; [id number]; IntraMuscular; Deltoid Left.; 0.5 milliLiter(s); VIS (VIS Published: 07-Aug-2015, VIS Presented: 08-Feb-2018);   influenza, injectable, quadrivalent, preservative free; 21-Sep-2020 18:51; Yan Cazares (RN); [employer]; [id number] (Exp. Date: 30-Jun-2021); IntraMuscular; Deltoid Right.; 0.5 milliLiter(s); VIS (VIS Published: 15-Aug-2019, VIS Presented: 21-Sep-2020);

## 2022-10-07 NOTE — PROGRESS NOTE ADULT - PROBLEM SELECTOR PLAN 2
likely iso worsening HF resolving   -holding entresto, will restart if stabilizes/improves  -diuresis as above  -monitor urine output   Aldactone
- likely related to congestion  - continue to trend daily  - continue bumex as stated above  - will repeat labs outpatient next week
likely iso worsening HF resolving   -holding entresto, will restart if stabilizes/improves  -diuresis as above  -monitor urine output    Add Aldactone
- likely related to congestion  - continue to trend daily  - continue bumex as stated above
- likely related to congestion  - continue to trend daily  - continue bumex as stated above
likely iso worsening HF resolving   -holding entresto, will restart if stabilizes/improves  -diuresis as above  -monitor urine output    Add Aldactone
likely iso worsening HF resolving   -holding entresto, will restart if stabilizes/improves  -diuresis as above  -monitor urine output   Aldactone
- likely related to congestion  - continue to trend daily  - continue bumex gtt as stated above
- likely related to congestion  - continue to trend daily  - continue bumex as stated above
likely iso worsening HF resolving   -holding entresto, will restart if stabilizes/improves  -diuresis as above  -monitor urine output    Add Aldactone
likely iso worsening HF resolving   -holding entresto, will restart if stabilizes/improves  -diuresis as above  -monitor urine output   Aldactone
likely iso worsening HF  -holding entresto, will restart if stabilizes/improves  -diuresis as above  -monitor urine output

## 2022-10-07 NOTE — PROGRESS NOTE ADULT - SUBJECTIVE AND OBJECTIVE BOX
Subjective:  - continues to note symptomatic improvement. Orthopnea has resolved. LE edema improving. His activity tolerance improving. He notes SOB with exertion walking around the unit  - Denies SOB at rest, CP, abdominal discomfort, lightheadedness/dizziness     Medications:  acetaminophen     Tablet .. 650 milliGRAM(s) Oral every 6 hours PRN  ALBUTerol    90 MICROgram(s) HFA Inhaler 1 Puff(s) Inhalation three times a day PRN  aspirin  chewable 81 milliGRAM(s) Oral daily  atorvastatin 80 milliGRAM(s) Oral at bedtime  clopidogrel Tablet 75 milliGRAM(s) Oral daily  dextrose 5%. 1000 milliLiter(s) IV Continuous <Continuous>  dextrose 5%. 1000 milliLiter(s) IV Continuous <Continuous>  dextrose 50% Injectable 25 Gram(s) IV Push once  dextrose 50% Injectable 12.5 Gram(s) IV Push once  dextrose 50% Injectable 25 Gram(s) IV Push once  dextrose Oral Gel 15 Gram(s) Oral once PRN  glucagon  Injectable 1 milliGRAM(s) IntraMuscular once  heparin   Injectable 5000 Unit(s) SubCutaneous every 8 hours  insulin glargine Injectable (LANTUS) 40 Unit(s) SubCutaneous at bedtime  insulin lispro (ADMELOG) corrective regimen sliding scale   SubCutaneous three times a day before meals  metoprolol succinate ER 75 milliGRAM(s) Oral daily  mupirocin 2% Ointment 1 Application(s) Topical <User Schedule>  nicotine - 21 mG/24Hr(s) Patch 1 Patch Transdermal daily  pantoprazole    Tablet 40 milliGRAM(s) Oral before breakfast  sacubitril 24 mG/valsartan 26 mG 1 Tablet(s) Oral two times a day  spironolactone 50 milliGRAM(s) Oral daily  tamsulosin 0.4 milliGRAM(s) Oral at bedtime      Physical Exam:    Vitals:  Vital Signs Last 24 Hours  T(C): 36.7 (10-07-22 @ 08:16), Max: 36.8 (10-06-22 @ 19:58)  HR: 100 (10-07-22 @ 12:31) (100 - 111)  BP: 95/67 (10-07-22 @ 12:31) (91/61 - 110/74)  RR: 18 (10-07-22 @ 12:31) (17 - 18)  SpO2: 97% (10-07-22 @ 12:31) (95% - 97%)    Weight in k.6 (10-07 @ 06:56)    I&O's Summary    06 Oct 2022 07:01  -  07 Oct 2022 07:00  --------------------------------------------------------  IN: 1140 mL / OUT: 3750 mL / NET: -2610 mL    07 Oct 2022 07:01  -  07 Oct 2022 16:06  --------------------------------------------------------  IN: 825 mL / OUT: 600 mL / NET: 225 mL    Tele: ST     General: No distress. Comfortable.  HEENT: EOM intact.  Neck: Neck supple. JVP ~12cm H2O, + HJR. No masses  Chest: Clear to auscultation bilaterally  CV: Normal S1 and S2. No murmurs, rub, or gallops. Radial pulses normal. +1 BLE edema, improving  Abdomen: Soft, non-distended, non-tender  Skin: No rashes or skin breakdown  Neurology: Alert and oriented times three. Sensation intact  Psych: Affect normal    Labs:    10-07    136  |  96  |  42<H>  ----------------------------<  239<H>  4.2   |  29  |  1.76<H>    Ca    9.5      07 Oct 2022 11:09  Mg     2.0     10-07    Serum Pro-Brain Natriuretic Peptide: 1852 pg/mL (10-07 @ 11:09)

## 2022-10-07 NOTE — DISCHARGE NOTE NURSING/CASE MANAGEMENT/SOCIAL WORK - CAREGIVER RELATION TO PATIENT
Letter printed and signed and faxed.  Tried calling pt left message.   
Patient is requesting a letter to be faxed to her employer excusing her absence from work dated 2/131/7 to 2/15/17. Please fax letter to [phone] Att: Clara. Please give patient a call to confirm this has been sent.  
spouse

## 2022-10-07 NOTE — PROGRESS NOTE ADULT - PROBLEM SELECTOR PROBLEM 4
Stented coronary artery

## 2022-10-07 NOTE — PROGRESS NOTE ADULT - SUBJECTIVE AND OBJECTIVE BOX
[facility]   [phone]  RENAL FOLLOW UP NOTE  --------------------------------------------------------------------------------  Chief Complaint:    24 hour events/subjective:    seen earlier  states breathing is better     PAST HISTORY  --------------------------------------------------------------------------------  No significant changes to PMH, PSH, FHx, SHx, unless otherwise noted    ALLERGIES & MEDICATIONS  --------------------------------------------------------------------------------  Allergies    No Known Allergies    Intolerances      Standing Inpatient Medications  aspirin  chewable 81 milliGRAM(s) Oral daily  atorvastatin 80 milliGRAM(s) Oral at bedtime  clopidogrel Tablet 75 milliGRAM(s) Oral daily  dextrose 5%. 1000 milliLiter(s) IV Continuous <Continuous>  dextrose 5%. 1000 milliLiter(s) IV Continuous <Continuous>  dextrose 50% Injectable 25 Gram(s) IV Push once  dextrose 50% Injectable 12.5 Gram(s) IV Push once  dextrose 50% Injectable 25 Gram(s) IV Push once  glucagon  Injectable 1 milliGRAM(s) IntraMuscular once  heparin   Injectable 5000 Unit(s) SubCutaneous every 8 hours  insulin glargine Injectable (LANTUS) 40 Unit(s) SubCutaneous at bedtime  insulin lispro (ADMELOG) corrective regimen sliding scale   SubCutaneous three times a day before meals  metoprolol succinate ER 75 milliGRAM(s) Oral daily  mupirocin 2% Ointment 1 Application(s) Topical <User Schedule>  nicotine - 21 mG/24Hr(s) Patch 1 Patch Transdermal daily  pantoprazole    Tablet 40 milliGRAM(s) Oral before breakfast  sacubitril 24 mG/valsartan 26 mG 1 Tablet(s) Oral two times a day  spironolactone 50 milliGRAM(s) Oral daily  tamsulosin 0.4 milliGRAM(s) Oral at bedtime    PRN Inpatient Medications  acetaminophen     Tablet .. 650 milliGRAM(s) Oral every 6 hours PRN  ALBUTerol    90 MICROgram(s) HFA Inhaler 1 Puff(s) Inhalation three times a day PRN  dextrose Oral Gel 15 Gram(s) Oral once PRN      REVIEW OF SYSTEMS  --------------------------------------------------------------------------------    Gen: denies  fevers/chills,  CVS: denies chest pain/palpitations  Resp: denies worsening  SOB/Cough  GI: Denies N/V/Abd pain  : Denies dysuria/oliguria/hematuria      VITALS/PHYSICAL EXAM  --------------------------------------------------------------------------------  T(C): 36.7 (10-07-22 @ 08:16), Max: 36.7 (10-07-22 @ 05:42)  HR: 100 (10-07-22 @ 12:31) (100 - 111)  BP: 95/67 (10-07-22 @ 12:31) (91/61 - 110/74)  RR: 18 (10-07-22 @ 12:31) (17 - 18)  SpO2: 97% (10-07-22 @ 12:31) (95% - 97%)  Wt(kg): --        10-06-22 @ 07:01  -  10-07-22 @ 07:00  --------------------------------------------------------  IN: 1140 mL / OUT: 3750 mL / NET: -2610 mL    10-07-22 @ 07:01  -  10-07-22 @ 21:15  --------------------------------------------------------  IN: 825 mL / OUT: 600 mL / NET: 225 mL      Physical Exam:  	                Gen: Appears comfortable   	Pulm: decrease bs, no rales, ronchi or wheezing  	CV: +Mild JVD. RRR, S1S2; no rub  	Abd: +BS, soft, nontender/+distended, +ascites, improving  	: No suprapubic tenderness  	UE: Warm, no cyanosis  no clubbing,  no edema;  	LE: Warm, no cyanosis  no clubbing, B/L  2-  edema up to thigh improving  	Skin: Warm, no decrease skin turgor, chronic venous stasis changes, tattoos      LABS/STUDIES  --------------------------------------------------------------------------------    136  |  96  |  42  ----------------------------<  239      [10-07-22 @ 11:09]  4.2   |  29  |  1.76        Ca     9.5     [10-07-22 @ 11:09]      Mg     2.0     [10-07-22 @ 11:09]            Creatinine Trend:  SCr 1.76 [10-07 @ 11:09]  SCr 1.93 [10-06 @ 20:05]  SCr 1.56 [10-06 @ 06:30]  SCr 1.65 [10-05 @ 06:24]  SCr 1.64 [10-04 @ 05:42]              Urinalysis - [10-03-22 @ 15:27]      Color Colorless / Appearance Clear / SG 1.008 / pH 6.0      Gluc Negative / Ketone Negative  / Bili Negative / Urobili Negative       Blood Negative / Protein Trace / Leuk Est Negative / Nitrite Negative      RBC  / WBC  / Hyaline  / Gran  / Sq Epi  / Non Sq Epi  / Bacteria     Urine Creatinine 30      [10-03-22 @ 15:43]  Urine Protein 13      [10-03-22 @ 15:43]    Iron 33, TIBC 390, %sat 9      [10-02-22 @ 07:03]  Ferritin 86      [10-02-22 @ 07:04]  PTH -- (Ca 8.9)      [10-02-22 @ 07:04]   73  HbA1c 8.9      [12-16-19 @ 08:15]  TSH 3.02      [09-30-22 @ 13:45]

## 2022-10-07 NOTE — PROGRESS NOTE ADULT - PROBLEM SELECTOR PROBLEM 3
Hypertension
CAD (coronary artery disease)
Hypertension
CAD (coronary artery disease)
Hypertension
Hypertension
CAD (coronary artery disease)
Hypertension

## 2022-10-07 NOTE — PROGRESS NOTE ADULT - NS ATTEND AMEND GEN_ALL_CORE FT
2+ LE edema decrease ascites and decrease bs   ckd III   cr is steady   chf pt has lost significant fluid over last 2d however, is still fluid overloaded. to change to bumex 2mg iv bid and can raise as needed  can restart entresto   d.w CHF team
cr steady range  chf improving fluid status with weight unchanged today   change bumex to 2 mg bid po with entresto to cont   trend k with aldactone   keep O> I
pt with excellent uo  fluid status is improving nicely   cr steady   decreased bumex drip to 0.5mg/hr   strict I/O   monitor lytes   d/w cards team
good u/o  will switch to bumex IV 2mg BID  renal following  repleat K > 4  will resume entresto 24-26mg po BID  will consider SGLT2i next
good u/o  switch bumex  2mg po BID  renal following  repleat K > 4  will cont entresto 24-26mg po BID  increase haylie 50mg po daily  increase metorpolol xl 75mg po daily  will consider SGLT2i  tomorrow  possible d/c on friday    Good candidate for cardio MEMS. discussed with pt and he is interested. will plan for Out pt follow up and MEMS implant as outpt.
good u/o  cont  bumex IV 2mg BID  renal following  repleat K > 4  will cont entresto 24-26mg po BID  will consider SGLT2i once off IV diuretics  possible d/c on friday
good u/o 3.5lits  cont  bumex  2mg po BID  renal following  repleat K > 4  will cont entresto 24-26mg po BID  cont haylie 50mg po daily  cont  metorpolol xl 75mg po daily  start farxiga 10mg po daily  d/c today with follow up in HF clinic next week     Good candidate for cardio MEMS. discussed with pt and he is interested. completed education for CArdioMEMS. will plan for Out pt follow up and MEMS implant as outpt.

## 2022-10-07 NOTE — PROGRESS NOTE ADULT - PROBLEM SELECTOR PROBLEM 1
Acute on chronic systolic HF (heart failure)

## 2022-10-07 NOTE — PROGRESS NOTE ADULT - ASSESSMENT
swith to PO diuretic in AM ?torsemidePatient has an ischemic cardiomyopathy with very low LVEF, now woth wsorsening sob, edema and increased abdominal girth Presently hemodynamically.  patient has lost almost 20 pounds and is feeling much better but is slightly tachycardic and creatinine has improved and overall sense of well being improved. anxious to go home  1. ischemic cardiomyoathy  2. acute on chronic CHF right greater than left  3. s/p stent to LCX  4.  LVEF 20%  5. CRI- creatinine today 1.9  6. 22 pound weight loss    Recommend  apppreciate advanced heart failure input  discharge home on bumex 2mg entresto 24-26 BID spirinolactone toprol   add farxiga- make sure insurance covered  followup with me within 2 weeks  will discuss Mems with patient in office  daily weight in AM-patient to keep records

## 2022-10-07 NOTE — PHARMACOTHERAPY INTERVENTION NOTE - COMMENTS
Counseled patient on discharge medication doses, indications, and possible side effects. Provided and reviewed medication cards. Answered all of the patient's questions to the best of my ability. Patient exhibited understanding of discharge medication regimen.     Medications discussed:  aspirin 81 mg oral tablet, chewable: 1 tab(s) orally once a day  atorvastatin 80 mg oral tablet: 1 tab(s) orally once a day (at bedtime)  bumetanide 2 mg oral tablet: 1 tab(s) orally once a day  clopidogrel 75 mg oral tablet: 1 tab(s) orally once a day  insulin glargine 100 units/mL subcutaneous solution: 40 unit(s) subcutaneous once a day (at bedtime)  insulin lispro 100 units/mL subcutaneous solution: 10 unit(s) subcutaneous 3 times a day (sliding scale)   Jardiance 10 mg oral tablet: 1 tab(s) orally once a day   metoprolol succinate 25 mg oral tablet, extended release: 3 tab(s) orally once a day  pantoprazole 40 mg oral delayed release tablet: 1 tab(s) orally once a day (before a meal)  sacubitril-valsartan 24 mg-26 mg oral tablet: 1 tab(s) orally 2 times a day  spironolactone 25 mg oral tablet: 2 tab(s) orally once a day  tamsulosin 0.4 mg oral capsule: 1 cap(s) orally once a day    Informed patient of $70 copay for Jardiance due to donut hole (patient has Medicare Part D), patient is agreeable to pay.    Sofiya Escalera, PharmD, BCPS  Clinical Pharmacy Specialist  [phone] or Teams

## 2022-10-07 NOTE — PROGRESS NOTE ADULT - PROBLEM SELECTOR PLAN 3
- s/p PCIs (most recent NERI to Circumflex in 12/2019)  - On DAPT and high intensity statin
currently normotensive  -continue metop, holding entresto
- s/p PCIs (most recent NERI to Circumflex in 12/2019)  - On DAPT and high intensity statin
currently normotensive  -continue metop, holding entresto

## 2022-10-07 NOTE — PROGRESS NOTE ADULT - ASSESSMENT
61 year old Male with PHSx HTN, CAD s/p stents, HFrEF s/p AICD, T2DM on insulin, OM of L toe s/p amputation, He presents to ED with worsening LE edema for 1 week.       1- CKD III  2- CHF  3- T2DM  4- anemia  5- hypokalemia      patient states baseline creatinine range 2.0-2.3 g/dL  renal sono reviewed from 7/2022 right moderate to severe chronic hydronephrosis   creatinine higher but his baseline is higher therefore cont diuretics   fluid status improving,   non-oliguric,   transition to bumex 2mg po BID  continue entresto as above   trend K+  aldactone 25 mg daily  anemia, iron deficiency   iron supplement   trend hgb  continue flomax  strict I/O  keep O>I  monitor daily standing weight at home as well d/w pt

## 2022-10-07 NOTE — PROGRESS NOTE ADULT - REASON FOR ADMISSION
LE swelling

## 2022-10-07 NOTE — PROGRESS NOTE ADULT - PROBLEM SELECTOR PLAN 4
-continue DAPT, metoprolol  -continue statin

## 2022-10-07 NOTE — PROGRESS NOTE ADULT - NS ATTEND OPT1 GEN_ALL_CORE
I independently performed the documented:
I attest my time as attending is greater than 50% of the total combined time spent on qualifying patient care activities by the PA/NP and attending.

## 2022-10-07 NOTE — DISCHARGE NOTE NURSING/CASE MANAGEMENT/SOCIAL WORK - PATIENT PORTAL LINK FT
You can access the FollowMyHealth Patient Portal offered by [facility] by registering at the following website: [email or web address]. By joining [facility]s FollowMyHealth portal, you will also be able to view your health information using other applications (apps) compatible with our system.

## 2022-10-07 NOTE — PROGRESS NOTE ADULT - PROVIDER SPECIALTY LIST ADULT
Cardiology
Cardiology
Heart Failure
Nephrology
Heart Failure
Nephrology
Cardiology
Cardiology
Hospitalist
Nephrology
Nephrology
Heart Failure
Hospitalist
Internal Medicine
Internal Medicine
Heart Failure
Heart Failure
Internal Medicine
Hospitalist
Hospitalist

## 2022-10-07 NOTE — DISCHARGE NOTE NURSING/CASE MANAGEMENT/SOCIAL WORK - NSDCFUADDAPPT_GEN_ALL_CORE_FT
Podiatry Discharge Instructions:  Follow up: Please follow up with Dr. Sigala within 1 week of discharge from the hospital, please call [phone] for appointment and discuss that you recently were seen in the hospital.  Wound Care: Please apply mupirocin ointment to the R foot sub metatarsal wound 3 followed by band-aid daily.   Weight bearing: Please weight bear as tolerated in a surgical shoe.  Antibiotics: Please continue as instructed.

## 2022-10-11 RX ORDER — INSULIN LISPRO 100 [IU]/ML
100 INJECTION, SOLUTION INTRAVENOUS; SUBCUTANEOUS
Refills: 0 | Status: ACTIVE | COMMUNITY
Start: 2018-02-08

## 2022-10-11 RX ORDER — METOPROLOL TARTRATE 50 MG/1
50 TABLET, FILM COATED ORAL
Refills: 0 | Status: DISCONTINUED | COMMUNITY
End: 2022-10-11

## 2022-10-11 RX ORDER — FUROSEMIDE 80 MG/1
80 TABLET ORAL
Refills: 0 | Status: DISCONTINUED | COMMUNITY
End: 2022-10-11

## 2022-10-11 RX ORDER — ROSUVASTATIN CALCIUM 20 MG/1
20 TABLET, FILM COATED ORAL
Qty: 30 | Refills: 0 | Status: DISCONTINUED | COMMUNITY
Start: 2019-03-05 | End: 2022-10-11

## 2022-10-11 RX ORDER — GLYCERIN 1 %
DROPS OPHTHALMIC (EYE)
Refills: 0 | Status: DISCONTINUED | COMMUNITY
End: 2022-10-11

## 2022-10-12 ENCOUNTER — APPOINTMENT (OUTPATIENT)
Dept: HEART FAILURE | Facility: CLINIC | Age: 62
End: 2022-10-12

## 2022-10-12 VITALS
DIASTOLIC BLOOD PRESSURE: 67 MMHG | TEMPERATURE: 97.5 F | HEIGHT: 77 IN | HEART RATE: 95 BPM | BODY MASS INDEX: 29.99 KG/M2 | SYSTOLIC BLOOD PRESSURE: 99 MMHG | WEIGHT: 254 LBS | OXYGEN SATURATION: 97 %

## 2022-10-12 PROCEDURE — 99213 OFFICE O/P EST LOW 20 MIN: CPT

## 2022-10-12 RX ORDER — METOPROLOL SUCCINATE 25 MG/1
25 TABLET, EXTENDED RELEASE ORAL
Refills: 0 | Status: ACTIVE | COMMUNITY
Start: 2022-10-11

## 2022-10-12 RX ORDER — ASPIRIN 81 MG/1
81 TABLET, CHEWABLE ORAL DAILY
Qty: 60 | Refills: 2 | Status: ACTIVE | COMMUNITY
Start: 2018-02-08

## 2022-10-12 RX ORDER — EMPAGLIFLOZIN 10 MG/1
10 TABLET, FILM COATED ORAL DAILY
Qty: 30 | Refills: 3 | Status: ACTIVE | COMMUNITY
Start: 2022-10-11

## 2022-10-17 LAB
ALBUMIN SERPL ELPH-MCNC: 4.2 G/DL
ALP BLD-CCNC: 115 U/L
ALT SERPL-CCNC: 20 U/L
ANION GAP SERPL CALC-SCNC: 19 MMOL/L
AST SERPL-CCNC: 22 U/L
BILIRUB SERPL-MCNC: 0.5 MG/DL
BUN SERPL-MCNC: 45 MG/DL
CALCIUM SERPL-MCNC: 9.6 MG/DL
CHLORIDE SERPL-SCNC: 97 MMOL/L
CO2 SERPL-SCNC: 22 MMOL/L
CREAT SERPL-MCNC: 1.72 MG/DL
EGFR: 45 ML/MIN/1.73M2
GLUCOSE SERPL-MCNC: 164 MG/DL
NT-PROBNP SERPL-MCNC: 1772 PG/ML
POTASSIUM SERPL-SCNC: 4.4 MMOL/L
PROT SERPL-MCNC: 7.6 G/DL
SODIUM SERPL-SCNC: 138 MMOL/L

## 2022-10-20 PROCEDURE — 85730 THROMBOPLASTIN TIME PARTIAL: CPT

## 2022-10-20 PROCEDURE — 85018 HEMOGLOBIN: CPT

## 2022-10-20 PROCEDURE — 82746 ASSAY OF FOLIC ACID SERUM: CPT

## 2022-10-20 PROCEDURE — 84132 ASSAY OF SERUM POTASSIUM: CPT

## 2022-10-20 PROCEDURE — 83735 ASSAY OF MAGNESIUM: CPT

## 2022-10-20 PROCEDURE — 84295 ASSAY OF SERUM SODIUM: CPT

## 2022-10-20 PROCEDURE — 83036 HEMOGLOBIN GLYCOSYLATED A1C: CPT

## 2022-10-20 PROCEDURE — 85025 COMPLETE CBC W/AUTO DIFF WBC: CPT

## 2022-10-20 PROCEDURE — 99285 EMERGENCY DEPT VISIT HI MDM: CPT

## 2022-10-20 PROCEDURE — 83605 ASSAY OF LACTIC ACID: CPT

## 2022-10-20 PROCEDURE — 80053 COMPREHEN METABOLIC PANEL: CPT

## 2022-10-20 PROCEDURE — 84100 ASSAY OF PHOSPHORUS: CPT

## 2022-10-20 PROCEDURE — 85045 AUTOMATED RETICULOCYTE COUNT: CPT

## 2022-10-20 PROCEDURE — 93005 ELECTROCARDIOGRAM TRACING: CPT

## 2022-10-20 PROCEDURE — 82947 ASSAY GLUCOSE BLOOD QUANT: CPT

## 2022-10-20 PROCEDURE — U0005: CPT

## 2022-10-20 PROCEDURE — 85027 COMPLETE CBC AUTOMATED: CPT

## 2022-10-20 PROCEDURE — 93306 TTE W/DOPPLER COMPLETE: CPT

## 2022-10-20 PROCEDURE — 83540 ASSAY OF IRON: CPT

## 2022-10-20 PROCEDURE — 85014 HEMATOCRIT: CPT

## 2022-10-20 PROCEDURE — 82310 ASSAY OF CALCIUM: CPT

## 2022-10-20 PROCEDURE — 84484 ASSAY OF TROPONIN QUANT: CPT

## 2022-10-20 PROCEDURE — 82570 ASSAY OF URINE CREATININE: CPT

## 2022-10-20 PROCEDURE — 84156 ASSAY OF PROTEIN URINE: CPT

## 2022-10-20 PROCEDURE — 82728 ASSAY OF FERRITIN: CPT

## 2022-10-20 PROCEDURE — 80048 BASIC METABOLIC PNL TOTAL CA: CPT

## 2022-10-20 PROCEDURE — 84550 ASSAY OF BLOOD/URIC ACID: CPT

## 2022-10-20 PROCEDURE — 84443 ASSAY THYROID STIM HORMONE: CPT

## 2022-10-20 PROCEDURE — 83970 ASSAY OF PARATHORMONE: CPT

## 2022-10-20 PROCEDURE — 96374 THER/PROPH/DIAG INJ IV PUSH: CPT

## 2022-10-20 PROCEDURE — 71045 X-RAY EXAM CHEST 1 VIEW: CPT

## 2022-10-20 PROCEDURE — 82435 ASSAY OF BLOOD CHLORIDE: CPT

## 2022-10-20 PROCEDURE — 82550 ASSAY OF CK (CPK): CPT

## 2022-10-20 PROCEDURE — 82553 CREATINE MB FRACTION: CPT

## 2022-10-20 PROCEDURE — 83880 ASSAY OF NATRIURETIC PEPTIDE: CPT

## 2022-10-20 PROCEDURE — 86901 BLOOD TYPING SEROLOGIC RH(D): CPT

## 2022-10-20 PROCEDURE — U0003: CPT

## 2022-10-20 PROCEDURE — 86850 RBC ANTIBODY SCREEN: CPT

## 2022-10-20 PROCEDURE — 81003 URINALYSIS AUTO W/O SCOPE: CPT

## 2022-10-20 PROCEDURE — 82607 VITAMIN B-12: CPT

## 2022-10-20 PROCEDURE — 86900 BLOOD TYPING SEROLOGIC ABO: CPT

## 2022-10-20 PROCEDURE — 85610 PROTHROMBIN TIME: CPT

## 2022-10-20 PROCEDURE — 82330 ASSAY OF CALCIUM: CPT

## 2022-10-20 PROCEDURE — 82803 BLOOD GASES ANY COMBINATION: CPT

## 2022-10-20 PROCEDURE — 83550 IRON BINDING TEST: CPT

## 2022-10-20 PROCEDURE — 0225U NFCT DS DNA&RNA 21 SARSCOV2: CPT

## 2022-10-20 PROCEDURE — 82962 GLUCOSE BLOOD TEST: CPT

## 2022-10-20 PROCEDURE — 36415 COLL VENOUS BLD VENIPUNCTURE: CPT

## 2022-10-24 ENCOUNTER — NON-APPOINTMENT (OUTPATIENT)
Age: 62
End: 2022-10-24

## 2022-10-24 ENCOUNTER — APPOINTMENT (OUTPATIENT)
Dept: WOUND CARE | Facility: CLINIC | Age: 62
End: 2022-10-24

## 2022-10-24 VITALS — TEMPERATURE: 97.3 F

## 2022-10-24 DIAGNOSIS — L97.519 NON-PRESSURE CHRONIC ULCER OF OTHER PART OF RIGHT FOOT WITH UNSPECIFIED SEVERITY: ICD-10-CM

## 2022-10-24 DIAGNOSIS — E13.42 OTHER SPECIFIED DIABETES MELLITUS WITH DIABETIC POLYNEUROPATHY: ICD-10-CM

## 2022-10-24 PROCEDURE — 99213 OFFICE O/P EST LOW 20 MIN: CPT

## 2022-10-24 NOTE — HISTORY OF PRESENT ILLNESS
[FreeTextEntry1] : Mr. Jessica is a 62 yo M with PMHx of ICM/HFrEF (LVIDd 6.5cm, LVEF 19%), s/p S-ICD,CAD s/p anterior wall STEMI ‘PCI to large RI ’18 PCI to LCx ‘19, COPD, ?sleep apnea, HTN,T2DM (A1c 7%,on insulin), CKD III, h/p severe R Hydronephrosis, osteo of L toe s/p amputation, recent tobacco use (quit 1 mo ago from 9/30/22), who presents for routine follow-up of his cardiomyopathy. Primary cardiologist Dr. Camara.\par \par He has had has long hx of ischemic cardiomyopathy starting in 2004 s/P MI. Was hospitalized in [place] in this year x 4weeks in setting of covid-19 infection, cellulitis, septic shock, ADHF and MANJIT requiring temporary RRT. Both him and his wife reports most of his medications had to be discontinued due to low BP and he was temporarily on dialysis due to worsening kidney function.\par \par Was smoking 1/2-1 pack cigarettes daily up until one month prior to presentation to NS.\par \par He presented to [facility] on 9/30/22 with several weeks of lower extremity edema, orthopnea, and abdominal distension admitted for ADHF. He was initially on a Bumex gtt and has been transitioned of IV diuretics. He is diuresing well with 24lb weight loss since admission. He is volume overloaded on exam but is diuresing well on oral diuretics with 3.8L UOP and 2kg weight loss over the past 24 hours. His Cr is downtrending. He's mildly tachycardic with borderline blood pressures but tolerating low dose GDMT. Weight today 248 lbs. He's stable for discharge home with close outpatient follow up for reassessment of volume status, further optimization of medications and repeat labs. \par \par Since discharge he has felt well. He has not performed distance walking as of yet, but is able to ambulate throughout home and able to go grocery shopping without limitations. His weight has gradually uptrended ~1 lbs/day since discharge which he attributes to increasing calorie consumption since he is now home today in clinic 254lbs (248lbs at discharge). His home SBP readings have trended 106/67 -115/71. He remains with + 2 pillow orthopnea which is unchanged and no PND. Medication review reveals he has been taking Metoprolol 25mg TID instead of 3 tablets once daily. He denies CP, palpitations, syncope, LH/dizziness, orthopnea, PND, abdominal discomfort, and LE edema. His ICD has not fired.

## 2022-10-24 NOTE — ADDENDUM
[FreeTextEntry1] : Discussed further recommendations for diuretic dosing he states he has challenges with frequent urination periods on twice daily dosing of diuretics, therefore instructed to take Bumex 4mg QD.

## 2022-10-24 NOTE — CARDIOLOGY SUMMARY
[de-identified] : \par 10/3/22 TTE: LVIDd 6.5, LVEF 19%, sev global LVSdysfunction, intermediate diastolic function, nml BiAtria, mld MR, mild AR, mi.d TR, Dilated IVC (2.6cm), RA pressure 15mmHg, RVSP 56mmHg\par \par TTE 2019: LVEF 10-15% LVIDd 7cm, mild MR, RVSP 26mmHg (RAP 8mmHg)\par  [de-identified] : \par [facility] 2019 LM normal, LAD ostial 100%, LCx 80%, RI normal, RCA mid 50%. S/p NERI to LCx. \par

## 2022-10-24 NOTE — ASSESSMENT
[FreeTextEntry1] : #chronic systolic HF (heart failure). \par - Continue Jardiance 10mg daily (farxiga not covered)\par - Increase Bumex to 2mg BID; target weight 242-243 lbs; once target weight is reached can resume Bumex 2 mg daily. \par - continue Entresto 24-26 mg BID; will repeat labs and if renal function and k+ permit will plan to escalate to mod dose .\par - Continue Toprol XL 75mg daily; will uptitrated once euvolemic. Instructed to take 75 mg daily\par - Continue spironolactone 50 mg daily\par -Labs: 10/7 Na136, K+ 4.2, BUN/Cr 42/1.7 Pro BNP 1852; will repeat today\par - Plan to repeat TTE once euvolemic and optimized on HF GDMT outpatient\par - Device: [employer] subcutaneous ICD\par - Referred to [facility]\par - CardioMEMs discussed  and they remain interested. Will optimize volume status and schedule implant at next available whit Dr. Smith.\par \par #CKD\par - diurese as above\par \par #CAD (coronary artery disease). \par  - s/p PCIs (most recent NERI to Circumflex in 12/2019)\par - On DAPT and high intensity statin.\par \par \par RTC with HF NP in 2 weeks, and with Dr. Vanegas at next available.

## 2022-10-24 NOTE — PHYSICAL EXAM
[Well Nourished] : well nourished [No Acute Distress] : no acute distress [No Xanthelasma] : no xanthelasma [Clear Lung Fields] : clear lung fields [Good Air Entry] : good air entry [Soft] : abdomen soft [Non Tender] : non-tender [Normal Gait] : normal gait [No Edema] : no edema [No Rash] : no rash [Moves all extremities] : moves all extremities [Alert and Oriented] : alert and oriented [de-identified] : JVP ~14-16cm of H20 [de-identified] : warm peripherally

## 2022-10-25 ENCOUNTER — APPOINTMENT (OUTPATIENT)
Dept: CARDIOLOGY | Facility: CLINIC | Age: 62
End: 2022-10-25

## 2022-10-25 ENCOUNTER — NON-APPOINTMENT (OUTPATIENT)
Age: 62
End: 2022-10-25

## 2022-10-25 VITALS
SYSTOLIC BLOOD PRESSURE: 100 MMHG | WEIGHT: 254 LBS | HEART RATE: 103 BPM | DIASTOLIC BLOOD PRESSURE: 60 MMHG | HEIGHT: 77 IN | OXYGEN SATURATION: 96 % | BODY MASS INDEX: 29.99 KG/M2

## 2022-10-25 DIAGNOSIS — G47.30 SLEEP APNEA, UNSPECIFIED: ICD-10-CM

## 2022-10-25 PROCEDURE — 93000 ELECTROCARDIOGRAM COMPLETE: CPT

## 2022-10-25 PROCEDURE — 99214 OFFICE O/P EST MOD 30 MIN: CPT

## 2022-10-25 RX ORDER — COVID-19 ANTIGEN TEST
KIT MISCELLANEOUS
Qty: 8 | Refills: 0 | Status: ACTIVE | COMMUNITY
Start: 2022-08-18

## 2022-10-25 NOTE — HISTORY OF PRESENT ILLNESS
[Type II Diabetes] : Type II Diabetes [Goal HgbA1c: ____] : Goal [facility]1c: [unfilled] [Goal FBS: ____] : Goal FBS: [unfilled] [Overview of diabetes and cardiovascular disease] : overview of diabetes and cardiovascular disease [Hypoglycemia and Hyperglycemia: sign and symptoms] : Hypoglycemia and Hyperglycemia: sign and symptoms [Yes, continue with Diabetes plan] : Yes, continue with Diabetes plan [Former smoker] : former smoker [< 12 months] : < 12 months [Patient will have a relapse plan] : Patient will have a relapse plan [Patient will remain a non-smoker] : Patient will remain a non-smoker [Discussed overview of risks of continued use] : overview of risks of continued use [Assess patient's relevance of quitting] : assess patient's relevance of quitting [Explore rewards of quitting] : explore rewards of quitting [Examine possible roadblocks to quitting] : examine possible roadblocks to quitting [Continue repetition of the discussion] : continue repetition of the discussion [Provide patient with contact information and brochure for [facility] ([facility])] : provide patient with contact information and brochure for [facility] ([facility]) [Yes, continue with Smoking/Tobacco Cession plan] : Yes, continue with Smoking/Tobacco Cession plan [Height: ___] : Height: [unfilled] [Monitoring of weight at home and at cardiac rehabilitation] : Monitoring of weight at home and at cardiac rehabilitation [Individualized exercise program as developed at cardiac rehabilitation] : Individualized exercise program as developed at cardiac rehabilitation [Calories and healthy weight management] : Calories and healthy weight management [Exercise and diet] : exercise and diet [Weight gain and heart failure implications] : weight gain and heart failure implications [Yes, continue with Weight Management plan] : Yes, continue with weight management plan [None] : none [Chronic systolic heart failure] : chronic systolic heart failure [Diabetes Mellitus] : diabetes mellitus [Sedentary] : sedentary [Overweight/Obesity] : overweight/obesity [Smoking] : smoking [Fall Risk] : fall risk [Perform aerobic activity for ___ consecutive minutes] : perform aerobic activity for [unfilled] consecutive minutes [To increase my time spent in physical activity and/or aerobic exercise] : to increase my time spent in physical activity and/or aerobic exercise [Exercise Benefits/Guidelines education] : exercise benefits/guidelines education [Hemodynamic responses] : hemodynamic responses [Teach back utilized] : teach back utilized [Welcome packet provided] : welcome packet provided [Yes, per exercise prescription, policy] : Yes, per exercise prescription, policy [Yes, continue with psychosocial plan] : Yes, continue with psychosocial plan [Obesity] : Obesity [Sugar] : sugar [Sodium] : sodium [Cholesterol] : cholesterol [Trans fats/saturated fats] : trans fats/saturated fats [Is patient on medication for hyperlipidemia?] : Is patient on medication for hyperlipidemia? Yes [Atorvastatin] : atorvastatin [Discuss an overview of healthy eating plan] : Discuss an overview of healthy eating plan [Yes, continue with nutrition plan] : Yes, continue with nutrition plan [In progress] : in progress [Is Patient adherent with medication?] : patient is adherent with medication [Is Patient aware of signs and symptoms of hypoglycemia and hyperglycemia?] : patient is aware of signs and symptoms of hypoglycemia and hyperglycemia [Patient will lose 1 to 2 lb per week] : Patient will lose 1 to 2 lb per week [Action] : Stage of change: action [Other restrictions: ____] : [unfilled] [Cardiac Rehabilitation] : Cardiac Rehabilitation [___ Days per week] : [unfilled] days per week [>= 31 minutes per session] : >= 31 minutes per session [Target RPE: ___] : Target RPE: [unfilled] [Treadmill] : treadmill [Recumbent bike] : recumbent bike [Upright exercise bike] : upright exercise bike [BioStep] : BioStep [Elliptical] : elliptical [Upper body ergometer] : upper body ergometer [Stair Climber] : stair climber [Walking] : walking [Assess in ___ weeks] : assess in [unfilled] weeks [Stretching/ROM exercises and balance exercises daily] : Stretching/ROM exercises and balance exercises daily [Will assess for musculoskeletal and other restrictions] : will assess for musculoskeletal and other restrictions [Self-reports of improved psychosocial well-being] : Self-reports of improved psychosocial well-being [Discuss overview of emotional health supportive modalities] : Discuss overview of emotional health supportive modalities [Relaxation breathing techniques] : relaxation breathing techniques [Stress management] : stress management [Hypertension] : Hypertension [Diabetes] : Diabetes [Heart Failure] : Heart Failure [Kidney Disease] : Kidney Disease [Significant other] : significant other [Patient is interested in seeing a registered dietitian?] : Patient is interested in seeing a registered dietitian? Yes [Self-reports of improved health eating patterns] : Self-reports of improved health eating patterns [FreeTextEntry2] : 1 ppd x 47 years [] : no [FreeTextEntry1] : Dr. Guilherme Goodson [FreeTextEntry5] : Dr. Garrison Boykin [FreeTextEntry6] : walking up to 20 consecutive minutes [FreeTextEntry9] : 10/25/22: Patient states he has supportive wife and enjoys his family and 2 grandkids. He is looking forward to his son's wedding this week. Relaxation and breathing discussed as healthy coping method. [FreeTextEntry8] : 10/25/22: Patient would like the RD contact information at session #1-declined having it mailed out.  [FreeTextEntry7] : Improved RYP score

## 2022-10-25 NOTE — ASSESSMENT
[FreeTextEntry1] : Patient is a 61 year old male with past history of ICM, LVEF 19%, s/p S-ICD, CAD, AWSTEMI, s/p PCI 2028 and 2019, COPD, recently quit smoking, HTN, T2DM, CKD III, s/p hospitalization in [place] for COVID-19 infections, ceullitis, septic shock and MANJIT with temporary hemodialysis. Patient was admitted to [facility] on 9/30/22 for acute on chronic systolic HF exacerbation, discharged on 10/7/22. Patient is adhering to prescribed medications, no acute complaints at present, in no acute distress.

## 2022-10-25 NOTE — REVIEW OF SYSTEMS
[Recent Change In Weight] : ~T no recent weight change [Hearing Loss] : no hearing loss [Chest Pain] : no chest pain [Palpitations] : no palpitations [Claudication] : no  leg claudication [Lower Ext Edema] : no lower extremity edema [Orthopena] : no orthopnea [Paroxysmal Nocturnal Dyspnea] : paroxysmal nocturnal dyspnea [FreeTextEntry3] : has not seen eye doctor in 2 years or so

## 2022-10-25 NOTE — ASSESSMENT
[FreeTextEntry1] : Patient is a 61 year old male with past history of ICM, LVEF 19%, s/p S-ICD, CAD, AWSTEMI, s/p PCI 2028 and 2019, COPD, recently quit smoking, HTN, T2DM, CKD III, s/p hospitalization in [place] for COVID-19 infections, ceullitis, septic shock and MANJIT with temporary hemodialysis. Patient was admitted to [facility] on 9/30/22 for acute on chronic systolic HF exacerbation, discharged on 10/7/22. Patient is adhering to prescribed medications, no acute complaints at present, in no acute distress.

## 2022-10-25 NOTE — CARDIOLOGY SUMMARY
[de-identified] : \par 10/3/22 TTE: LVIDd 6.5, LVEF 19%, sev global LVSdysfunction, intermediate diastolic function, nml BiAtria, mld MR, mild AR, mi.d TR, Dilated IVC (2.6cm), RA pressure 15mmHg, RVSP 56mmHg\par \par TTE 2019: LVEF 10-15% LVIDd 7cm, mild MR, RVSP 26mmHg (RAP 8mmHg)\par  [de-identified] : \par [facility] 2019 LM normal, LAD ostial 100%, LCx 80%, RI normal, RCA mid 50%. S/p NERI to LCx. \par

## 2022-10-25 NOTE — CARDIOLOGY SUMMARY
[de-identified] : \par 10/3/22 TTE: LVIDd 6.5, LVEF 19%, sev global LVSdysfunction, intermediate diastolic function, nml BiAtria, mld MR, mild AR, mi.d TR, Dilated IVC (2.6cm), RA pressure 15mmHg, RVSP 56mmHg\par \par TTE 2019: LVEF 10-15% LVIDd 7cm, mild MR, RVSP 26mmHg (RAP 8mmHg)\par  [de-identified] : \par [facility] 2019 LM normal, LAD ostial 100%, LCx 80%, RI normal, RCA mid 50%. S/p NERI to LCx. \par

## 2022-10-25 NOTE — REASON FOR VISIT
[Home] : at home, [unfilled] , at the time of the visit. [Medical Office: ([facility])___] : at the medical office located in  [Intake Visit] : an intake visit [Assessment] : an assessment [FreeTextEntry1] : ITP to be reviewed and manually signed by Dr. Cross; ITP to be finalized at session #1; Clinical indication for cardiac rehabilitation: chronic systolic heart failure

## 2022-10-25 NOTE — REASON FOR VISIT
[Home] : at home, [unfilled] , at the time of the visit. [Medical Office: ([facility])___] : at the medical office located in  [Intake Visit] : an intake visit [Assessment] : an assessment [FreeTextEntry1] : ITP to be reviewed and manually signed by Dr. Cross; ITP to be finalized at session #1; Clinical indication for cardiac rehabilitation: chronic systolic heart failure

## 2022-10-25 NOTE — PLAN
[FreeTextEntry1] : Cardiac Rehab Phase 2\par Discussed program; all questions answered.\par ITP initiated – to confer with Dr. Newton\par Focus PE to take place at session #1. \par Start date: 1/4/2023\par \par \par \par \par CR phone number made available for questions/concerns.\par Welcome packet mailed.\par Patient declined RD or smoking cessation contact information at this time, will provide at session #1.\par Time spent with patient: 30 minutes.

## 2022-10-25 NOTE — HISTORY OF PRESENT ILLNESS
[Type II Diabetes] : Type II Diabetes [Goal HgbA1c: ____] : Goal [facility]c: [unfilled] [Goal FBS: ____] : Goal FBS: [unfilled] [Overview of diabetes and cardiovascular disease] : overview of diabetes and cardiovascular disease [Hypoglycemia and Hyperglycemia: sign and symptoms] : Hypoglycemia and Hyperglycemia: sign and symptoms [Yes, continue with Diabetes plan] : Yes, continue with Diabetes plan [Former smoker] : former smoker [< 12 months] : < 12 months [Patient will have a relapse plan] : Patient will have a relapse plan [Patient will remain a non-smoker] : Patient will remain a non-smoker [Discussed overview of risks of continued use] : overview of risks of continued use [Assess patient's relevance of quitting] : assess patient's relevance of quitting [Explore rewards of quitting] : explore rewards of quitting [Examine possible roadblocks to quitting] : examine possible roadblocks to quitting [Continue repetition of the discussion] : continue repetition of the discussion [Provide patient with contact information and brochure for [facility] ([facility])] : provide patient with contact information and brochure for [facility] ([facility]) [Yes, continue with Smoking/Tobacco Cession plan] : Yes, continue with Smoking/Tobacco Cession plan [Height: ___] : Height: [unfilled] [Monitoring of weight at home and at cardiac rehabilitation] : Monitoring of weight at home and at cardiac rehabilitation [Individualized exercise program as developed at cardiac rehabilitation] : Individualized exercise program as developed at cardiac rehabilitation [Calories and healthy weight management] : Calories and healthy weight management [Exercise and diet] : exercise and diet [Weight gain and heart failure implications] : weight gain and heart failure implications [Yes, continue with Weight Management plan] : Yes, continue with weight management plan [None] : none [Chronic systolic heart failure] : chronic systolic heart failure [Diabetes Mellitus] : diabetes mellitus [Sedentary] : sedentary [Overweight/Obesity] : overweight/obesity [Smoking] : smoking [Fall Risk] : fall risk [Perform aerobic activity for ___ consecutive minutes] : perform aerobic activity for [unfilled] consecutive minutes [To increase my time spent in physical activity and/or aerobic exercise] : to increase my time spent in physical activity and/or aerobic exercise [Exercise Benefits/Guidelines education] : exercise benefits/guidelines education [Hemodynamic responses] : hemodynamic responses [Teach back utilized] : teach back utilized [Welcome packet provided] : welcome packet provided [Yes, per exercise prescription, policy] : Yes, per exercise prescription, policy [Yes, continue with psychosocial plan] : Yes, continue with psychosocial plan [Obesity] : Obesity [Sugar] : sugar [Sodium] : sodium [Cholesterol] : cholesterol [Trans fats/saturated fats] : trans fats/saturated fats [Is patient on medication for hyperlipidemia?] : Is patient on medication for hyperlipidemia? Yes [Atorvastatin] : atorvastatin [Discuss an overview of healthy eating plan] : Discuss an overview of healthy eating plan [Yes, continue with nutrition plan] : Yes, continue with nutrition plan [In progress] : in progress [Is Patient adherent with medication?] : patient is adherent with medication [Is Patient aware of signs and symptoms of hypoglycemia and hyperglycemia?] : patient is aware of signs and symptoms of hypoglycemia and hyperglycemia [Patient will lose 1 to 2 lb per week] : Patient will lose 1 to 2 lb per week [Action] : Stage of change: action [Other restrictions: ____] : [unfilled] [Cardiac Rehabilitation] : Cardiac Rehabilitation [___ Days per week] : [unfilled] days per week [>= 31 minutes per session] : >= 31 minutes per session [Target RPE: ___] : Target RPE: [unfilled] [Treadmill] : treadmill [Recumbent bike] : recumbent bike [Upright exercise bike] : upright exercise bike [BioStep] : BioStep [Elliptical] : elliptical [Upper body ergometer] : upper body ergometer [Stair Climber] : stair climber [Walking] : walking [Assess in ___ weeks] : assess in [unfilled] weeks [Stretching/ROM exercises and balance exercises daily] : Stretching/ROM exercises and balance exercises daily [Will assess for musculoskeletal and other restrictions] : will assess for musculoskeletal and other restrictions [Self-reports of improved psychosocial well-being] : Self-reports of improved psychosocial well-being [Discuss overview of emotional health supportive modalities] : Discuss overview of emotional health supportive modalities [Relaxation breathing techniques] : relaxation breathing techniques [Stress management] : stress management [Hypertension] : Hypertension [Diabetes] : Diabetes [Heart Failure] : Heart Failure [Kidney Disease] : Kidney Disease [Significant other] : significant other [Patient is interested in seeing a registered dietitian?] : Patient is interested in seeing a registered dietitian? Yes [Self-reports of improved health eating patterns] : Self-reports of improved health eating patterns [FreeTextEntry2] : 1 ppd x 47 years [] : no [FreeTextEntry1] : Dr. Guilherme Goodson [FreeTextEntry5] : Dr. Garrison Boykin [FreeTextEntry6] : walking up to 20 consecutive minutes [FreeTextEntry9] : 10/25/22: Patient states he has supportive wife and enjoys his family and 2 grandkids. He is looking forward to his son's wedding this week. Relaxation and breathing discussed as healthy coping method. [FreeTextEntry7] : Improved RYP score [FreeTextEntry8] : 10/25/22: Patient would like the RD contact information at session #1-declined having it mailed out.

## 2022-10-26 NOTE — HISTORY OF PRESENT ILLNESS
[FreeTextEntry1] :  in brief,  Rui is 62 years old with a long history of an ischemic cardiomyopathy.  He has had ejection fractions which have ranged between 10 and 30%.  He has had recurrent episodes of congestive heart failure.  He is status post stent to the ramus in 2018 at [facility]. At time of the catheterization he had a total occlusion of his ostial LAD total occlusion of the right coronary artery and a total occlusion of the ramus which was successfully recanalized and stented.  It however did not make a difference in his LV function\par \par .  He had returned from [place] with progressive shortness of breath and edema.  He was admitted to [facility] where he was seen by the heart failure center as well as myself.  He had excellent diuresis on an IV Bumex drip with loss of weight and feeling much better.  He was placed on Jardiance 10 continued on Toprol now up to 75 mg a day and Bumex was 2 mg 2 pills in the morning.  (Total 4 mg a day)\par \par  overall he is feeling better but is short of breath with mild exertion and recently has gained weight.  He was advised to increase his Bumex to 4 mg twice a day total 8 mg for the next few days as he is going to a wedding and is likely to have a salt intake which is high.\par \par   Overall he has been feeling better since his admission to nausea but still is short of breath with mild exertion and now has increasing edema and weight gain.  He is adherent to his medication.  He may not be that careful with salt intake

## 2022-10-26 NOTE — REASON FOR VISIT
[FreeTextEntry1] : Rui Jessica 62 years old recently hospitalized at [facility] where I met him for the first time with progressive congestive heart failure and edema.  He has a long cardiac history of an ischemic cardiomyopathy with a severely depressed ejection fraction

## 2022-10-26 NOTE — PHYSICAL EXAM
[Well Developed] : well developed [Well Nourished] : well nourished [Normal Conjunctiva] : normal conjunctiva [No Carotid Bruit] : no carotid bruit [Normal S1, S2] : normal S1, S2 [No Murmur] : no murmur [de-identified] :  in good spirits but overall  stable but clearly overweight wit [de-identified] :  fine rales at the bases bilaterally which do not clear with cough [de-identified] :  protuberant abdomen [de-identified] :  2+ pitting edema tight legs

## 2022-10-26 NOTE — ASSESSMENT
[FreeTextEntry1] :  Abdulkadir has a severe ischemic cardiomyopathy with severe LV dysfunction with his last ejection fraction approximately 20 to 25% on echo at [facility].  He has chronic congestive heart failure both right and left-sided.  He did improve in the hospital with active diuresis and is being followed by the heart failure center.\par \par   Presently he has gained weight and perhaps is more short of breath and his diuretics have been increased

## 2022-10-26 NOTE — DISCUSSION/SUMMARY
[FreeTextEntry1] :  1.  For now he will increase the Bumex to 4 mg twice a day for the next few days.  Starting on Saturday he will decrease it to 4 mg a.m. 2 mg p.m.\par \par  2.  Follow-up with me again in 1 month\par \par  3.  I told him the importance of watching the salt in his diet taking his weight on a daily basis and adjusting his diuretics accordingly.\par \par   At some point in the future, the patient may need more advanced heart failure treatment.  Presently he is on guideline medication [EKG obtained to assist in diagnosis and management of assessed problem(s)] : EKG obtained to assist in diagnosis and management of assessed problem(s)

## 2022-10-31 NOTE — PHYSICAL EXAM
[de-identified] : Bilateral plantar right foot wound submet4 with no acute signs of infection. Nails x10 are elongated, thickened, and discolored.

## 2022-10-31 NOTE — HISTORY OF PRESENT ILLNESS
[FreeTextEntry1] : 60 Y/O DM pt. presents to wound center for follow-up of submet 3 wound of the right foot. Patient has h/o LF P3RR, on 1/21/22. He denies any trouble walking, or pain in the left foot. Pt ambulates to clinic today wearing inserts and NB sneakers. Patient went to [facility] and was seen by Dr. Sigala for osteomyelitis 2-3 weeks ago, for admission to the hospital for SOB and fluid around his heart x1 week. Overall, pt is doing well. He denies any SOB after d/c. He denies any trouble walking and dose not use an assisted device to ambulate. \par

## 2022-10-31 NOTE — PLAN
[FreeTextEntry1] : 61M with RIght foot plantar submet 3rd wound to subQ \par - Pt seen and evaluated.\par - Right foot submet 3 wound down to subQ surrounded by hyperkeratotic skin, no drainage, no signs of infection, no periwound erythema. Nails x10 are elongated, thickened, and discolored.\par - Pt continue wearing NB shoes with insoles  multidensity insoles (Rx previously given).\par - Pt to continue wearing NB shoes with supporting insoles at at times \par - Pt advised to wear supportive insoles in his dressy shoes at his Son's wedding to avoid pressure ulcers on the plantar right foot. \par - Wound care with mupirocin and DSD daily.\par - RTC in 2 weeks.

## 2022-11-01 ENCOUNTER — APPOINTMENT (OUTPATIENT)
Dept: HEART FAILURE | Facility: CLINIC | Age: 62
End: 2022-11-01

## 2022-11-01 VITALS
HEIGHT: 77 IN | WEIGHT: 251 LBS | DIASTOLIC BLOOD PRESSURE: 68 MMHG | OXYGEN SATURATION: 96 % | HEART RATE: 105 BPM | SYSTOLIC BLOOD PRESSURE: 99 MMHG | BODY MASS INDEX: 29.64 KG/M2

## 2022-11-01 DIAGNOSIS — N40.0 BENIGN PROSTATIC HYPERPLASIA WITHOUT LOWER URINARY TRACT SYMPMS: ICD-10-CM

## 2022-11-01 DIAGNOSIS — E11.9 TYPE 2 DIABETES MELLITUS W/OUT COMPLICATIONS: ICD-10-CM

## 2022-11-01 PROCEDURE — 99213 OFFICE O/P EST LOW 20 MIN: CPT

## 2022-11-01 RX ORDER — NIRMATRELVIR AND RITONAVIR 150-100 MG
10 X 150 MG & KIT ORAL
Qty: 20 | Refills: 0 | Status: DISCONTINUED | COMMUNITY
Start: 2022-08-11 | End: 2022-11-01

## 2022-11-01 RX ORDER — ALBUTEROL SULFATE 90 UG/1
108 (90 BASE) INHALANT RESPIRATORY (INHALATION)
Refills: 0 | Status: ACTIVE | COMMUNITY
Start: 2018-12-19

## 2022-11-01 RX ORDER — METOPROLOL SUCCINATE 50 MG/1
50 TABLET, EXTENDED RELEASE ORAL
Qty: 180 | Refills: 0 | Status: DISCONTINUED | COMMUNITY
Start: 2022-06-25 | End: 2022-11-01

## 2022-11-01 RX ORDER — METOPROLOL TARTRATE 25 MG/1
25 TABLET, FILM COATED ORAL
Qty: 60 | Refills: 0 | Status: DISCONTINUED | COMMUNITY
Start: 2022-08-31 | End: 2022-11-01

## 2022-11-01 NOTE — DISCUSSION/SUMMARY
[FreeTextEntry1] : 63 yo M with PMHx of ICM/HFrEF (LVIDd 6.5cm, LVEF 19%), s/p S-ICD,CAD s/p anterior wall STEMI ‘PCI to large RI ’18 PCI to LCx ‘19, COPD, ?sleep apnea, HTN,T2DM (A1c 7%,on insulin), CKD III, h/p severe R Hydronephrosis, osteo of L toe s/p amputation, recent tobacco use (quit 1 mo ago from 9/30/22), who presents for routine follow-up of his cardiomyopathy. He is ACC/AHA Stage C HF, endorsing NYHA Class II-II symptoms. He is volume up on exam likely in setting of inconsistent diuretic doses.

## 2022-11-01 NOTE — ASSESSMENT
[FreeTextEntry1] : #chronic systolic HF (heart failure). \par - Continue Jardiance 10 mg daily (farxiga not covered)\par - Continue Bumex to 4mg BID until at target weight 243-244 lbs; once target weight is reached can resume Bumex 4 mg daily. \par - Increase evening  Entresto to 49-51 mg and continue 24-51mg in AM; if feeling well after 3-4 days instructed to increase Am dose to moderate dose.\par - Continue Toprol XL 75mg daily; will uptitrated once euvolemic. \par - Continue spironolactone 50 mg daily\par -Labs: 10/12 Na 138, K+ 4.2, BUN/Cr 42/1.7 Pro BNP 1772 from 1852; will repeat next week after escalation of ARNI. \par - Plan to repeat TTE once euvolemic and optimized on HF GDMT outpatient\par - Device: [employer] subcutaneous ICD\par - Referred to Cardiac Rehab on [facility]\par - Card[facility] discussed and they remain interested. Will optimize volume status and schedule implant at next available with Dr. Smith.\par \par #CKD\par - diurese as above\par \par #CAD (coronary artery disease). \par  - s/p PCIs (most recent NERI to Circumflex in 12/2019)\par - On DAPT and high intensity statin.\par \par \par RTC in 3 weeks with Dr. Vanegas

## 2022-11-01 NOTE — PHYSICAL EXAM
[Well Nourished] : well nourished [No Acute Distress] : no acute distress [No Xanthelasma] : no xanthelasma [Normal S1, S2] : normal S1, S2 [No Gallop] : no gallop [Clear Lung Fields] : clear lung fields [Good Air Entry] : good air entry [Soft] : abdomen soft [Normal Gait] : normal gait [No Edema] : no edema [No Rash] : no rash [Moves all extremities] : moves all extremities [Alert and Oriented] : alert and oriented [de-identified] : JVP ~12 cm of H20 [de-identified] : warm peripherally  [de-identified] : a

## 2022-11-01 NOTE — CARDIOLOGY SUMMARY
[de-identified] : \par 10/3/22 TTE: LVIDd 6.5, LVEF 19%, sev global LVSdysfunction, intermediate diastolic function, nml BiAtria, mld MR, mild AR, mi.d TR, Dilated IVC (2.6cm), RA pressure 15mmHg, RVSP 56mmHg\par \par TTE 2019: LVEF 10-15% LVIDd 7cm, mild MR, RVSP 26mmHg (RAP 8mmHg)\par  [de-identified] : \par [facility] 2019 LM normal, LAD ostial 100%, LCx 80%, RI normal, RCA mid 50%. S/p NERI to LCx. \par

## 2022-11-01 NOTE — HISTORY OF PRESENT ILLNESS
[FreeTextEntry1] : Mr. Jessica is a 61 yo M with PMHx of ICM/HFrEF (LVIDd 6.5cm, LVEF 19%), s/p S-ICD,CAD s/p anterior wall STEMI ‘PCI to large RI ’18 PCI to LCx ‘19, COPD, ?sleep apnea, HTN,T2DM (A1c 7%,on insulin), CKD III, h/p severe R Hydronephrosis, osteo of L toe s/p amputation, recent tobacco use (quit 1 mo ago from 9/30/22), who presents for routine follow-up of his cardiomyopathy. Primary cardiologist Dr. Camara.\par \par He has had has long hx of ischemic cardiomyopathy starting in 2004 s/P MI. Was hospitalized in [place] in this year x 4weeks in setting of covid-19 infection, cellulitis, septic shock, ADHF and MANJIT requiring temporary RRT. Both him and his wife reports most of his medications had to be discontinued due to low BP and he was temporarily on dialysis due to worsening kidney function.\par \par Was smoking 1/2-1 pack cigarettes daily up until one month prior to presentation to [facility].\par \par He presented to [facility] on 9/30/22 with several weeks of lower extremity edema, orthopnea, and abdominal distension admitted for ADHF. He was initially on a Bumex gtt and has been transitioned of IV diuretics. He is diuresing well with 24lb weight loss since admission. He is volume overloaded on exam but is diuresing well on oral diuretics with 3.8L UOP and 2kg weight loss over the past 24 hours. His Cr is downtrending. He's mildly tachycardic with borderline blood pressures but tolerating low dose GDMT. Discharged at weight of 248 lbs which is not dry weight. . \par \par Since last seen had had fluctuations in weight likely due to inconsistent doses of Bumex 4 mg BID as her recently has had to travel for his sons wedding. He reports self reducing Bumex to 4 mg qd from BID since Friday 10/28 and though his weight is not yet at goal it is  downtrending from 255 lbs to home weight today of 251 lbs. He does not perform distance walking but states he was able to recently walk throughout. mall shopping without limitations. His home SBP readings range from 95 - 110 and his HR ranges from 's. He remains with unchanged + 2 pillow orthopnea, denies PND, no bendopnea. His appetite has been good and he continues to limit sodium in his diet though admits to occasional discretions. otherwise, no LH/Dizziness, no CP/Palpitations, no abdominal distention and no LE edema. His ICD has not fired. \par \par Of note, at last CardioMEMs discussed and they were agreeable however recently was instructed by his PMD to not undergo. Discussed benefits and indication of CardioMEMs implant and he would like to review device with Tracie at next visit when his wife is present.

## 2022-11-02 NOTE — PRE-OP CHECKLIST - TO WHOM
Goal Outcome Evaluation:  Plan of Care Reviewed With: patient        Progress: no change  Outcome Evaluation: IOL, Cytotec placed. Plan for , desires epidural. Support person at bedside.   OR RN

## 2022-11-07 ENCOUNTER — APPOINTMENT (OUTPATIENT)
Dept: WOUND CARE | Facility: CLINIC | Age: 62
End: 2022-11-07

## 2022-11-07 VITALS
TEMPERATURE: 97.3 F | RESPIRATION RATE: 17 BRPM | SYSTOLIC BLOOD PRESSURE: 95 MMHG | HEART RATE: 106 BPM | DIASTOLIC BLOOD PRESSURE: 56 MMHG

## 2022-11-07 DIAGNOSIS — L97.521 NON-PRESSURE CHRONIC ULCER OF OTHER PART OF LEFT FOOT LIMITED TO BREAKDOWN OF SKIN: ICD-10-CM

## 2022-11-07 DIAGNOSIS — L97.512 NON-PRESSURE CHRONIC ULCER OF OTHER PART OF RIGHT FOOT WITH FAT LAYER EXPOSED: ICD-10-CM

## 2022-11-07 PROCEDURE — 99213 OFFICE O/P EST LOW 20 MIN: CPT

## 2022-11-14 NOTE — REVIEW OF SYSTEMS
[Negative] : Respiratory [Fever] : no fever [Chills] : no chills [Vomiting] : no vomiting [Diarrhea] : no diarrhea

## 2022-11-14 NOTE — HISTORY OF PRESENT ILLNESS
[FreeTextEntry1] : 61 yo DM pt. presents to wound center for follow-up of submet 3 wound of the right foot. Patient has h/o LF partial 3rd ray resection on 1/2022. He denies any trouble walking, or pain in the left foot. Pt ambulates to clinic today wearing inserts and NB sneakers. Patient went to [facility] and was seen by Dr. Sigala for osteomyelitis 4 weeks ago, for admission to the hospital for SOB and fluid around his heart x1 week. pt hit his left 5th toe 1 week ago and developed blister now has wound to dermis, pt is applying mupirocin to wounds,pt denies any F/C/N/V/SOB or other complaints\par

## 2022-11-14 NOTE — PLAN
[FreeTextEntry1] : 62 M with RIght foot plantar submet 3rd wound to subQ \par - Pt seen and evaluated.\par - Right foot submet 3 wound down to subQ surrounded by hyperkeratotic skin, no drainage, no signs of infection.\par - left foot 5th digit dorsal wound to dermis, no signs of infection \par - sharp excisional debridement of right foot wound to the level of and not beyond subQ using# 10 blade\par - Pt continue wearing NB shoes with insoles multidensity insoles (Rx previously given).\par - Pt to continue wearing NB shoes with supporting insoles at at times \par - Pt advised to wear supportive insoles in his dressy shoes at his Son's wedding to avoid pressure ulcers on the plantar right foot. \par - Wound care with mupirocin and DSD daily.\par - RTC in 2 weeks.

## 2022-11-14 NOTE — PHYSICAL EXAM
[1+] : left 1+ [de-identified] : Bilateral plantar right foot wound submet4 with no acute signs of infection. Nails x10 are elongated, thickened, and discolored.

## 2022-11-15 ENCOUNTER — NON-APPOINTMENT (OUTPATIENT)
Age: 62
End: 2022-11-15

## 2022-11-21 ENCOUNTER — APPOINTMENT (OUTPATIENT)
Dept: ELECTROPHYSIOLOGY | Facility: CLINIC | Age: 62
End: 2022-11-21

## 2022-11-21 ENCOUNTER — APPOINTMENT (OUTPATIENT)
Dept: HEART FAILURE | Facility: CLINIC | Age: 62
End: 2022-11-21

## 2022-11-23 ENCOUNTER — APPOINTMENT (OUTPATIENT)
Dept: HEART FAILURE | Facility: CLINIC | Age: 62
End: 2022-11-23

## 2022-11-23 VITALS
OXYGEN SATURATION: 97 % | HEIGHT: 77 IN | WEIGHT: 259 LBS | TEMPERATURE: 97.34 F | BODY MASS INDEX: 30.58 KG/M2 | DIASTOLIC BLOOD PRESSURE: 62 MMHG | SYSTOLIC BLOOD PRESSURE: 94 MMHG | HEART RATE: 92 BPM | RESPIRATION RATE: 16 BRPM

## 2022-11-23 DIAGNOSIS — E78.00 PURE HYPERCHOLESTEROLEMIA, UNSPECIFIED: ICD-10-CM

## 2022-11-23 DIAGNOSIS — I50.22 CHRONIC SYSTOLIC (CONGESTIVE) HEART FAILURE: ICD-10-CM

## 2022-11-23 DIAGNOSIS — I21.09 ST ELEVATION (STEMI) MYOCARDIAL INFARCTION INVOLVING OTHER CORONARY ARTERY OF ANTERIOR WALL: ICD-10-CM

## 2022-11-23 DIAGNOSIS — N18.9 CHRONIC KIDNEY DISEASE, UNSPECIFIED: ICD-10-CM

## 2022-11-23 PROCEDURE — 99214 OFFICE O/P EST MOD 30 MIN: CPT

## 2022-11-25 ENCOUNTER — NON-APPOINTMENT (OUTPATIENT)
Age: 62
End: 2022-11-25

## 2022-11-25 DIAGNOSIS — E87.6 HYPOKALEMIA: ICD-10-CM

## 2022-11-25 LAB
ANION GAP SERPL CALC-SCNC: 17 MMOL/L
BUN SERPL-MCNC: 46 MG/DL
CALCIUM SERPL-MCNC: 9.3 MG/DL
CHLORIDE SERPL-SCNC: 101 MMOL/L
CO2 SERPL-SCNC: 24 MMOL/L
CREAT SERPL-MCNC: 1.75 MG/DL
EGFR: 44 ML/MIN/1.73M2
GLUCOSE SERPL-MCNC: 127 MG/DL
MAGNESIUM SERPL-MCNC: 2 MG/DL
NT-PROBNP SERPL-MCNC: 1636 PG/ML
POTASSIUM SERPL-SCNC: 3.1 MMOL/L
SODIUM SERPL-SCNC: 142 MMOL/L

## 2022-11-28 ENCOUNTER — APPOINTMENT (OUTPATIENT)
Dept: WOUND CARE | Facility: CLINIC | Age: 62
End: 2022-11-28

## 2022-11-28 PROCEDURE — 99213 OFFICE O/P EST LOW 20 MIN: CPT

## 2022-11-29 ENCOUNTER — NON-APPOINTMENT (OUTPATIENT)
Age: 62
End: 2022-11-29

## 2022-11-29 LAB
ANION GAP SERPL CALC-SCNC: 19 MMOL/L
BUN SERPL-MCNC: 68 MG/DL
CALCIUM SERPL-MCNC: 9.6 MG/DL
CHLORIDE SERPL-SCNC: 96 MMOL/L
CO2 SERPL-SCNC: 24 MMOL/L
CREAT SERPL-MCNC: 2.6 MG/DL
EGFR: 27 ML/MIN/1.73M2
GLUCOSE SERPL-MCNC: 157 MG/DL
NT-PROBNP SERPL-MCNC: 1282 PG/ML
POTASSIUM SERPL-SCNC: 3.5 MMOL/L
SODIUM SERPL-SCNC: 139 MMOL/L

## 2022-11-29 NOTE — PLAN
[FreeTextEntry1] : 62 M with RIght foot plantar submet 3rd wound to subQ \par - Pt seen and evaluated.\par - Right foot submet 3 and submet 1 wound down to subQ surrounded by hyperkeratotic skin, no drainage, no signs of infection.\par - left foot 5th digit dorsal wound to dermis, no signs of infection \par - sharp excisional debridement to the B/L wounds to the level of and not beyond subQ using# 10 blade\par - Pt to continue wearing NB shoes with supporting insoles at at times \par - Wound care with mupirocin and DSD daily.\par - Patient to return to clinic next visit in the AM for visit with Reji about shoe orthotics. \par - RTC in 2 weeks.

## 2022-11-29 NOTE — HISTORY OF PRESENT ILLNESS
[FreeTextEntry1] : 63 yo DM pt. presents to wound center for follow-up of submet 3 wound of the right foot. Patient has h/o LF partial 3rd ray resection on 1/2022. He denies any trouble walking, or pain in the left foot. Pt ambulates to clinic today wearing inserts and NB sneakers. Patient went to [facility] and was seen by Dr. Sigala for osteomyelitis over 1.5 months ago, for admission to the hospital for SOB and fluid around his heart x1 week. Pt is applying mupirocin to wounds daily. Pt denies any F/C/N/V/SOB or other complaints\par \par \par FBS 11/28/22: 180

## 2022-11-29 NOTE — PHYSICAL EXAM
[1+] : left 1+ [Please See PDF for [employer]] : Please See PDF for [employer]. [de-identified] : Right foot submet 3 and submet 1 wound down to subQ surrounded by hyperkeratotic skin, no drainage, no signs of infection.\par Left foot 5th digit dorsal wound to dermis, no signs of infection  Nails x10 are elongated, thickened, and discolored.

## 2022-12-05 PROBLEM — N18.9 CHRONIC RENAL INSUFFICIENCY: Status: ACTIVE | Noted: 2020-10-31

## 2022-12-05 PROBLEM — I50.22 ACC/AHA STAGE C CHRONIC SYSTOLIC HEART FAILURE: Status: ACTIVE | Noted: 2022-12-05

## 2022-12-05 PROBLEM — I21.09 ST ELEVATION MYOCARDIAL INFARCTION (STEMI) INVOLVING OTHER CORONARY ARTERY OF ANTERIOR WALL: Status: ACTIVE | Noted: 2018-02-13

## 2022-12-05 LAB
ANION GAP SERPL CALC-SCNC: 15 MMOL/L
BUN SERPL-MCNC: 63 MG/DL
CALCIUM SERPL-MCNC: 9.6 MG/DL
CHLORIDE SERPL-SCNC: 97 MMOL/L
CO2 SERPL-SCNC: 24 MMOL/L
CREAT SERPL-MCNC: 2.08 MG/DL
EGFR: 36 ML/MIN/1.73M2
GLUCOSE SERPL-MCNC: 293 MG/DL
NT-PROBNP SERPL-MCNC: 930 PG/ML
POTASSIUM SERPL-SCNC: 4.5 MMOL/L
SODIUM SERPL-SCNC: 137 MMOL/L

## 2022-12-05 NOTE — REASON FOR VISIT
[Cardiac Failure] : cardiac failure [Spouse] : spouse [FreeTextEntry1] : Cards: Dr. Billy Camara\par EP: Dr. Rodríguez

## 2022-12-05 NOTE — END OF VISIT
[FreeTextEntry3] :  I, Ana Vanegas MD independently performed a history and physical examination of the patient, and formulated the management plan.\par I have reviewed the note by DAISHA Montaño and agree with the documented findings and plan of care.\par  [Time Spent: ___ minutes] : I have spent [unfilled] minutes of time on the encounter.

## 2022-12-05 NOTE — CARDIOLOGY SUMMARY
[de-identified] : \par 10/3/22 TTE: LVIDd 6.5, LVEF 19%, sev global LVSdysfunction, intermediate diastolic function, nml BiAtria, mld MR, mild AR, mi.d TR, Dilated IVC (2.6cm), RA pressure 15mmHg, RVSP 56mmHg\par \par TTE 2019: LVEF 10-15% LVIDd 7cm, mild MR, RVSP 26mmHg (RAP 8mmHg)\par  [de-identified] : \par [facility] 2019 LM normal, LAD ostial 100%, LCx 80%, RI normal, RCA mid 50%. S/p NERI to LCx. \par

## 2022-12-05 NOTE — HISTORY OF PRESENT ILLNESS
[FreeTextEntry1] : Mr. Jessica is a 61 y/o male with h/o chronic systolic HF ACC/AHA stage C-D, ICMP LVEF 10-15% LVIDd 7cm, CAD s/p anterior wall STEMI ‘PCI to large RI ’18 PCI to LCx ‘19, single chamber ICD s/p extraction due to bacteremia followed by s/p S-ICD implant ‘20`, HTN, recent tobacco use (quit 9/2022), COPD, LUIS ALFREDO not on CPAP, DM 2 (a1c 7%), L toe osteomyelitis s/p amputation 1/2022, CKD 3 and h/o right mod-sev hydronephrosis who presents today for follow-up of his HF.\par \par He has had has long hx of ischemic cardiomyopathy starting in 2004 s/p MI. Was hospitalized in [place] in this year x 4weeks in setting of covid-19 infection, cellulitis, septic shock, ADHF and MANJIT requiring temporary RRT. Both him and his wife reports most of his medications had to be discontinued due to low BP and he was temporarily on dialysis due to worsening kidney function.\par \par He presented to [facility] on 9/30/22 with several weeks of lower extremity edema, orthopnea, and abdominal distension admitted for ADHF. He was diuresed approximately 24 lbs on a Bumex gtt. Discharged on 10/7 at a weight of 248 lbs. \par \clifford Since last seen had had fluctuations in weight likely due to inconsistent doses of Bumex 4 mg BID as her recently has had to travel for his sons wedding. He reports self reducing Bumex to 4 mg qd from BID since Friday 10/28 and though his weight is not yet at goal it is  downtrending from 255 lbs to home weight today of 251 lbs. He does not perform distance walking but states he was able to recently walk throughout. mall shopping without limitations. His home SBP readings range from 95 - 110 and his HR ranges from 's. He remains with unchanged + 2 pillow orthopnea, denies PND, no bendopnea. His appetite has been good and he continues to limit sodium in his diet though admits to occasional discretions. otherwise, no LH/Dizziness, no CP/Palpitations, no abdominal distention and no LE edema. His ICD has not fired. \par \par Not much has changed since his last visit. HE reports +1 Le edema and has been adjusting his bumex dose. His wife also reports orthopnea and cough.  No hospitalizations since his last visit. No ICD shocks. \par

## 2022-12-05 NOTE — ASSESSMENT
[FreeTextEntry1] : # Chronic systolic HF ACC/AHA stage C-D, NYHA class\par Etiology: ICMP, LVEF 10-15%, LVIDd 7cm\par GDMT: Toprol Xl 75 mg daily, Entresto 49-51 mg BID, spironolactone 50 mg daily, and Jardiance 10 mg daily\par Diuretics: Continue Bumex to 4mg BID until at target weight 243-244 lbs; Start metolazone 2.5mg twice a week\par Obtain labs in a 7-10 days \par Device: s/p S-ICD\par Labs: \par HF education provided including lifestyle changes (low Na diet, fluid restriction, increase physical activity), current clinical condition, natural progression and prognosis. \par Will repeat TTE once euvolemic and optimized on HF GDMT outpatient\par Referred to [facility]\par CardioMEMs discussed and they remain interested. Will optimize volume status and schedule implant at next available with Dr. Smith.\par \par #CKD 3\par diurese as above\par \par #CAD\par s/p anterior wall STEMI ‘PCI to large RI ’18 PCI to LCx ‘19\par On DAPT and high intensity statin\par \par RTC in 3 weeks with Dr. Vanegas

## 2022-12-05 NOTE — REVIEW OF SYSTEMS
[FreeTextEntry1] : A complete review of systems was obtained and is negative except as stated in HPI (systems reviewed: Const, Eyes, ENT, Resp, CV, GI, , MSK, Skin, Neuro, Pysch, Endo, Hemato/Lymph and Allergy/Immuno)

## 2022-12-05 NOTE — DISCUSSION/SUMMARY
[FreeTextEntry1] : 61 y/o male with h/o chronic systolic HF ACC/AHA stage C-D, ICMP LVEF 10-15% LVIDd 7cm, CAD s/p anterior wall STEMI ‘PCI to large RI ’18 PCI to LCx ‘19, single chamber ICD s/p extraction due to bacteremia followed by s/p S-ICD implant ‘20`, HTN, recent tobacco use (quit 9/2022), COPD, LUIS ALFREDO not on CPAP, DM 2 (a1c 7%), L toe osteomyelitis s/p amputation 1/2022, CKD 3 and h/o right mod-sev hydronephrosis who presents today for follow-up of his HF. He is clinically hypervolemic, warm extremities. Im not sure compliance with diuretics is a 100%. We explained importance of medication/diet adherence. Will add metolazone and follow up in 3 weeks. He is a great candidate for cardioMEMs. He states that his PCP recommended him against it, unclear why. \par Plan as above.

## 2022-12-05 NOTE — PHYSICAL EXAM
[Well Nourished] : well nourished [No Acute Distress] : no acute distress [No Xanthelasma] : no xanthelasma [Normal S1, S2] : normal S1, S2 [No Gallop] : no gallop [Clear Lung Fields] : clear lung fields [Good Air Entry] : good air entry [Soft] : abdomen soft [Normal Gait] : normal gait [No Edema] : no edema [No Rash] : no rash [Moves all extremities] : moves all extremities [Alert and Oriented] : alert and oriented [de-identified] : JVP ~12 cm of H20 [de-identified] : warm peripherally  [de-identified] : +1 b/l LE edema

## 2022-12-09 ENCOUNTER — NON-APPOINTMENT (OUTPATIENT)
Age: 62
End: 2022-12-09

## 2022-12-12 ENCOUNTER — INPATIENT (INPATIENT)
Facility: HOSPITAL | Age: 62
LOS: 2 days | Discharge: HOME | DRG: 683 | End: 2022-12-15
Attending: STUDENT IN AN ORGANIZED HEALTH CARE EDUCATION/TRAINING PROGRAM | Admitting: STUDENT IN AN ORGANIZED HEALTH CARE EDUCATION/TRAINING PROGRAM
Payer: MEDICARE

## 2022-12-12 ENCOUNTER — APPOINTMENT (OUTPATIENT)
Dept: WOUND CARE | Facility: CLINIC | Age: 62
End: 2022-12-12

## 2022-12-12 VITALS
SYSTOLIC BLOOD PRESSURE: 63 MMHG | HEIGHT: 72 IN | RESPIRATION RATE: 18 BRPM | HEART RATE: 74 BPM | OXYGEN SATURATION: 98 % | WEIGHT: 175.05 LBS | DIASTOLIC BLOOD PRESSURE: 48 MMHG | TEMPERATURE: 97 F

## 2022-12-12 DIAGNOSIS — I95.9 HYPOTENSION, UNSPECIFIED: ICD-10-CM

## 2022-12-12 DIAGNOSIS — I50.22 CHRONIC SYSTOLIC (CONGESTIVE) HEART FAILURE: ICD-10-CM

## 2022-12-12 DIAGNOSIS — N17.9 ACUTE KIDNEY FAILURE, UNSPECIFIED: ICD-10-CM

## 2022-12-12 DIAGNOSIS — R53.83 OTHER FATIGUE: ICD-10-CM

## 2022-12-12 DIAGNOSIS — E11.9 TYPE 2 DIABETES MELLITUS WITHOUT COMPLICATIONS: ICD-10-CM

## 2022-12-12 DIAGNOSIS — I25.10 ATHEROSCLEROTIC HEART DISEASE OF NATIVE CORONARY ARTERY WITHOUT ANGINA PECTORIS: ICD-10-CM

## 2022-12-12 DIAGNOSIS — Z29.9 ENCOUNTER FOR PROPHYLACTIC MEASURES, UNSPECIFIED: ICD-10-CM

## 2022-12-12 DIAGNOSIS — R19.7 DIARRHEA, UNSPECIFIED: ICD-10-CM

## 2022-12-12 DIAGNOSIS — Z98.52 VASECTOMY STATUS: Chronic | ICD-10-CM

## 2022-12-12 DIAGNOSIS — I10 ESSENTIAL (PRIMARY) HYPERTENSION: ICD-10-CM

## 2022-12-12 DIAGNOSIS — L29.9 PRURITUS, UNSPECIFIED: ICD-10-CM

## 2022-12-12 DIAGNOSIS — R53.1 WEAKNESS: ICD-10-CM

## 2022-12-12 LAB
ALBUMIN SERPL ELPH-MCNC: 4.2 G/DL — SIGNIFICANT CHANGE UP (ref 3.3–5)
ALBUMIN SERPL ELPH-MCNC: 4.4 G/DL — SIGNIFICANT CHANGE UP (ref 3.3–5)
ALP SERPL-CCNC: 119 U/L — SIGNIFICANT CHANGE UP (ref 40–120)
ALP SERPL-CCNC: 120 U/L — SIGNIFICANT CHANGE UP (ref 40–120)
ALT FLD-CCNC: 15 U/L — SIGNIFICANT CHANGE UP (ref 10–45)
ALT FLD-CCNC: 17 U/L — SIGNIFICANT CHANGE UP (ref 10–45)
ANION GAP SERPL CALC-SCNC: 16 MMOL/L — SIGNIFICANT CHANGE UP (ref 5–17)
ANION GAP SERPL CALC-SCNC: 18 MMOL/L — HIGH (ref 5–17)
ANISOCYTOSIS BLD QL: SLIGHT — SIGNIFICANT CHANGE UP
APPEARANCE UR: CLEAR — SIGNIFICANT CHANGE UP
APTT BLD: 31.1 SEC — SIGNIFICANT CHANGE UP (ref 27.5–35.5)
AST SERPL-CCNC: 14 U/L — SIGNIFICANT CHANGE UP (ref 10–40)
AST SERPL-CCNC: 16 U/L — SIGNIFICANT CHANGE UP (ref 10–40)
BACTERIA # UR AUTO: NEGATIVE — SIGNIFICANT CHANGE UP
BASE EXCESS BLDV CALC-SCNC: -10.3 MMOL/L — LOW (ref -2–3)
BASE EXCESS BLDV CALC-SCNC: -11.3 MMOL/L — LOW (ref -2–3)
BASE EXCESS BLDV CALC-SCNC: -11.9 MMOL/L — LOW (ref -2–3)
BASOPHILS # BLD AUTO: 0.19 K/UL — SIGNIFICANT CHANGE UP (ref 0–0.2)
BASOPHILS NFR BLD AUTO: 2.6 % — HIGH (ref 0–2)
BILIRUB SERPL-MCNC: 0.3 MG/DL — SIGNIFICANT CHANGE UP (ref 0.2–1.2)
BILIRUB SERPL-MCNC: 0.3 MG/DL — SIGNIFICANT CHANGE UP (ref 0.2–1.2)
BILIRUB UR-MCNC: NEGATIVE — SIGNIFICANT CHANGE UP
BUN SERPL-MCNC: 137 MG/DL — HIGH (ref 7–23)
BUN SERPL-MCNC: 145 MG/DL — HIGH (ref 7–23)
CA-I SERPL-SCNC: 1.23 MMOL/L — SIGNIFICANT CHANGE UP (ref 1.15–1.33)
CA-I SERPL-SCNC: 1.24 MMOL/L — SIGNIFICANT CHANGE UP (ref 1.15–1.33)
CA-I SERPL-SCNC: 1.24 MMOL/L — SIGNIFICANT CHANGE UP (ref 1.15–1.33)
CALCIUM SERPL-MCNC: 9.1 MG/DL — SIGNIFICANT CHANGE UP (ref 8.4–10.5)
CALCIUM SERPL-MCNC: 9.4 MG/DL — SIGNIFICANT CHANGE UP (ref 8.4–10.5)
CHLORIDE BLDV-SCNC: 100 MMOL/L — SIGNIFICANT CHANGE UP (ref 96–108)
CHLORIDE BLDV-SCNC: 100 MMOL/L — SIGNIFICANT CHANGE UP (ref 96–108)
CHLORIDE BLDV-SCNC: 98 MMOL/L — SIGNIFICANT CHANGE UP (ref 96–108)
CHLORIDE SERPL-SCNC: 100 MMOL/L — SIGNIFICANT CHANGE UP (ref 96–108)
CHLORIDE SERPL-SCNC: 96 MMOL/L — SIGNIFICANT CHANGE UP (ref 96–108)
CO2 BLDV-SCNC: 17 MMOL/L — LOW (ref 22–26)
CO2 BLDV-SCNC: 20 MMOL/L — LOW (ref 22–26)
CO2 BLDV-SCNC: 21 MMOL/L — LOW (ref 22–26)
CO2 SERPL-SCNC: 14 MMOL/L — LOW (ref 22–31)
CO2 SERPL-SCNC: 17 MMOL/L — LOW (ref 22–31)
COLOR SPEC: SIGNIFICANT CHANGE UP
CREAT SERPL-MCNC: 2.88 MG/DL — HIGH (ref 0.5–1.3)
CREAT SERPL-MCNC: 3.22 MG/DL — HIGH (ref 0.5–1.3)
DIFF PNL FLD: NEGATIVE — SIGNIFICANT CHANGE UP
EGFR: 21 ML/MIN/1.73M2 — LOW
EGFR: 24 ML/MIN/1.73M2 — LOW
ELLIPTOCYTES BLD QL SMEAR: SLIGHT — SIGNIFICANT CHANGE UP
EOSINOPHIL # BLD AUTO: 0.07 K/UL — SIGNIFICANT CHANGE UP (ref 0–0.5)
EOSINOPHIL NFR BLD AUTO: 0.9 % — SIGNIFICANT CHANGE UP (ref 0–6)
EPI CELLS # UR: 0 /HPF — SIGNIFICANT CHANGE UP
GAS PNL BLDV: 128 MMOL/L — LOW (ref 136–145)
GAS PNL BLDV: 128 MMOL/L — LOW (ref 136–145)
GAS PNL BLDV: 130 MMOL/L — LOW (ref 136–145)
GAS PNL BLDV: SIGNIFICANT CHANGE UP
GLUCOSE BLDC GLUCOMTR-MCNC: 148 MG/DL — HIGH (ref 70–99)
GLUCOSE BLDV-MCNC: 122 MG/DL — HIGH (ref 70–99)
GLUCOSE BLDV-MCNC: 132 MG/DL — HIGH (ref 70–99)
GLUCOSE BLDV-MCNC: 149 MG/DL — HIGH (ref 70–99)
GLUCOSE SERPL-MCNC: 117 MG/DL — HIGH (ref 70–99)
GLUCOSE SERPL-MCNC: 148 MG/DL — HIGH (ref 70–99)
GLUCOSE UR QL: ABNORMAL
HCO3 BLDV-SCNC: 16 MMOL/L — LOW (ref 22–29)
HCO3 BLDV-SCNC: 18 MMOL/L — LOW (ref 22–29)
HCO3 BLDV-SCNC: 19 MMOL/L — LOW (ref 22–29)
HCT VFR BLD CALC: 41 % — SIGNIFICANT CHANGE UP (ref 39–50)
HCT VFR BLDA CALC: 39 % — SIGNIFICANT CHANGE UP (ref 39–51)
HCT VFR BLDA CALC: 39 % — SIGNIFICANT CHANGE UP (ref 39–51)
HCT VFR BLDA CALC: 40 % — SIGNIFICANT CHANGE UP (ref 39–51)
HGB BLD CALC-MCNC: 12.9 G/DL — SIGNIFICANT CHANGE UP (ref 12.6–17.4)
HGB BLD CALC-MCNC: 12.9 G/DL — SIGNIFICANT CHANGE UP (ref 12.6–17.4)
HGB BLD CALC-MCNC: 13.3 G/DL — SIGNIFICANT CHANGE UP (ref 12.6–17.4)
HGB BLD-MCNC: 12.4 G/DL — LOW (ref 13–17)
HYALINE CASTS # UR AUTO: 11 /LPF — HIGH (ref 0–2)
INR BLD: 1.11 RATIO — SIGNIFICANT CHANGE UP (ref 0.88–1.16)
KETONES UR-MCNC: NEGATIVE — SIGNIFICANT CHANGE UP
LACTATE BLDV-MCNC: 1 MMOL/L — SIGNIFICANT CHANGE UP (ref 0.5–2)
LACTATE BLDV-MCNC: 1.5 MMOL/L — SIGNIFICANT CHANGE UP (ref 0.5–2)
LACTATE BLDV-MCNC: 3 MMOL/L — HIGH (ref 0.5–2)
LACTATE SERPL-SCNC: 1.1 MMOL/L — SIGNIFICANT CHANGE UP (ref 0.5–2)
LEUKOCYTE ESTERASE UR-ACNC: NEGATIVE — SIGNIFICANT CHANGE UP
LYMPHOCYTES # BLD AUTO: 0.63 K/UL — LOW (ref 1–3.3)
LYMPHOCYTES # BLD AUTO: 8.5 % — LOW (ref 13–44)
MANUAL SMEAR VERIFICATION: SIGNIFICANT CHANGE UP
MCHC RBC-ENTMCNC: 21.6 PG — LOW (ref 27–34)
MCHC RBC-ENTMCNC: 30.2 GM/DL — LOW (ref 32–36)
MCV RBC AUTO: 71.4 FL — LOW (ref 80–100)
MICROCYTES BLD QL: SLIGHT — SIGNIFICANT CHANGE UP
MONOCYTES # BLD AUTO: 1.08 K/UL — HIGH (ref 0–0.9)
MONOCYTES NFR BLD AUTO: 14.5 % — HIGH (ref 2–14)
NEUTROPHILS # BLD AUTO: 5.47 K/UL — SIGNIFICANT CHANGE UP (ref 1.8–7.4)
NEUTROPHILS NFR BLD AUTO: 73.5 % — SIGNIFICANT CHANGE UP (ref 43–77)
NITRITE UR-MCNC: NEGATIVE — SIGNIFICANT CHANGE UP
NT-PROBNP SERPL-SCNC: 803 PG/ML — HIGH (ref 0–300)
OVALOCYTES BLD QL SMEAR: SLIGHT — SIGNIFICANT CHANGE UP
PCO2 BLDV: 43 MMHG — SIGNIFICANT CHANGE UP (ref 42–55)
PCO2 BLDV: 50 MMHG — SIGNIFICANT CHANGE UP (ref 42–55)
PCO2 BLDV: 63 MMHG — HIGH (ref 42–55)
PH BLDV: 7.09 — CRITICAL LOW (ref 7.32–7.43)
PH BLDV: 7.17 — CRITICAL LOW (ref 7.32–7.43)
PH BLDV: 7.18 — CRITICAL LOW (ref 7.32–7.43)
PH UR: 6 — SIGNIFICANT CHANGE UP (ref 5–8)
PLAT MORPH BLD: NORMAL — SIGNIFICANT CHANGE UP
PLATELET # BLD AUTO: 199 K/UL — SIGNIFICANT CHANGE UP (ref 150–400)
PO2 BLDV: 30 MMHG — SIGNIFICANT CHANGE UP (ref 25–45)
PO2 BLDV: 32 MMHG — SIGNIFICANT CHANGE UP (ref 25–45)
PO2 BLDV: 41 MMHG — SIGNIFICANT CHANGE UP (ref 25–45)
POIKILOCYTOSIS BLD QL AUTO: SLIGHT — SIGNIFICANT CHANGE UP
POTASSIUM BLDV-SCNC: 5.3 MMOL/L — HIGH (ref 3.5–5.1)
POTASSIUM BLDV-SCNC: 5.5 MMOL/L — HIGH (ref 3.5–5.1)
POTASSIUM BLDV-SCNC: 5.5 MMOL/L — HIGH (ref 3.5–5.1)
POTASSIUM SERPL-MCNC: 5.3 MMOL/L — SIGNIFICANT CHANGE UP (ref 3.5–5.3)
POTASSIUM SERPL-MCNC: 5.4 MMOL/L — HIGH (ref 3.5–5.3)
POTASSIUM SERPL-SCNC: 5.3 MMOL/L — SIGNIFICANT CHANGE UP (ref 3.5–5.3)
POTASSIUM SERPL-SCNC: 5.4 MMOL/L — HIGH (ref 3.5–5.3)
PROT SERPL-MCNC: 8.3 G/DL — SIGNIFICANT CHANGE UP (ref 6–8.3)
PROT SERPL-MCNC: 8.4 G/DL — HIGH (ref 6–8.3)
PROT UR-MCNC: ABNORMAL
PROTHROM AB SERPL-ACNC: 12.8 SEC — SIGNIFICANT CHANGE UP (ref 10.5–13.4)
RAPID RVP RESULT: SIGNIFICANT CHANGE UP
RBC # BLD: 5.74 M/UL — SIGNIFICANT CHANGE UP (ref 4.2–5.8)
RBC # FLD: 18.5 % — HIGH (ref 10.3–14.5)
RBC BLD AUTO: ABNORMAL
RBC CASTS # UR COMP ASSIST: 2 /HPF — SIGNIFICANT CHANGE UP (ref 0–4)
SAO2 % BLDV: 38.1 % — LOW (ref 67–88)
SAO2 % BLDV: 39.2 % — LOW (ref 67–88)
SAO2 % BLDV: 62.1 % — LOW (ref 67–88)
SARS-COV-2 RNA SPEC QL NAA+PROBE: SIGNIFICANT CHANGE UP
SODIUM SERPL-SCNC: 130 MMOL/L — LOW (ref 135–145)
SODIUM SERPL-SCNC: 131 MMOL/L — LOW (ref 135–145)
SP GR SPEC: 1.01 — SIGNIFICANT CHANGE UP (ref 1.01–1.02)
TROPONIN T, HIGH SENSITIVITY RESULT: 88 NG/L — HIGH (ref 0–51)
TROPONIN T, HIGH SENSITIVITY RESULT: 98 NG/L — HIGH (ref 0–51)
UROBILINOGEN FLD QL: NEGATIVE — SIGNIFICANT CHANGE UP
WBC # BLD: 7.44 K/UL — SIGNIFICANT CHANGE UP (ref 3.8–10.5)
WBC # FLD AUTO: 7.44 K/UL — SIGNIFICANT CHANGE UP (ref 3.8–10.5)
WBC UR QL: 1 /HPF — SIGNIFICANT CHANGE UP (ref 0–5)

## 2022-12-12 PROCEDURE — 99291 CRITICAL CARE FIRST HOUR: CPT

## 2022-12-12 PROCEDURE — 99223 1ST HOSP IP/OBS HIGH 75: CPT

## 2022-12-12 PROCEDURE — 93010 ELECTROCARDIOGRAM REPORT: CPT

## 2022-12-12 PROCEDURE — 71045 X-RAY EXAM CHEST 1 VIEW: CPT | Mod: 26

## 2022-12-12 RX ORDER — INSULIN LISPRO 100/ML
VIAL (ML) SUBCUTANEOUS
Refills: 0 | Status: DISCONTINUED | OUTPATIENT
Start: 2022-12-12 | End: 2022-12-15

## 2022-12-12 RX ORDER — INSULIN GLARGINE 100 [IU]/ML
20 INJECTION, SOLUTION SUBCUTANEOUS AT BEDTIME
Refills: 0 | Status: DISCONTINUED | OUTPATIENT
Start: 2022-12-12 | End: 2022-12-14

## 2022-12-12 RX ORDER — CLOPIDOGREL BISULFATE 75 MG/1
75 TABLET, FILM COATED ORAL DAILY
Refills: 0 | Status: DISCONTINUED | OUTPATIENT
Start: 2022-12-13 | End: 2022-12-15

## 2022-12-12 RX ORDER — ONDANSETRON 8 MG/1
4 TABLET, FILM COATED ORAL EVERY 8 HOURS
Refills: 0 | Status: DISCONTINUED | OUTPATIENT
Start: 2022-12-12 | End: 2022-12-15

## 2022-12-12 RX ORDER — DEXTROSE 50 % IN WATER 50 %
25 SYRINGE (ML) INTRAVENOUS ONCE
Refills: 0 | Status: DISCONTINUED | OUTPATIENT
Start: 2022-12-12 | End: 2022-12-15

## 2022-12-12 RX ORDER — TAMSULOSIN HYDROCHLORIDE 0.4 MG/1
0.4 CAPSULE ORAL AT BEDTIME
Refills: 0 | Status: DISCONTINUED | OUTPATIENT
Start: 2022-12-12 | End: 2022-12-15

## 2022-12-12 RX ORDER — LANOLIN ALCOHOL/MO/W.PET/CERES
3 CREAM (GRAM) TOPICAL AT BEDTIME
Refills: 0 | Status: DISCONTINUED | OUTPATIENT
Start: 2022-12-12 | End: 2022-12-15

## 2022-12-12 RX ORDER — ALBUTEROL 90 UG/1
1 AEROSOL, METERED ORAL THREE TIMES A DAY
Refills: 0 | Status: DISCONTINUED | OUTPATIENT
Start: 2022-12-12 | End: 2022-12-15

## 2022-12-12 RX ORDER — SODIUM CHLORIDE 9 MG/ML
1000 INJECTION, SOLUTION INTRAVENOUS ONCE
Refills: 0 | Status: DISCONTINUED | OUTPATIENT
Start: 2022-12-12 | End: 2022-12-12

## 2022-12-12 RX ORDER — HEPARIN SODIUM 5000 [USP'U]/ML
5000 INJECTION INTRAVENOUS; SUBCUTANEOUS EVERY 8 HOURS
Refills: 0 | Status: DISCONTINUED | OUTPATIENT
Start: 2022-12-12 | End: 2022-12-15

## 2022-12-12 RX ORDER — SODIUM CHLORIDE 9 MG/ML
1000 INJECTION INTRAMUSCULAR; INTRAVENOUS; SUBCUTANEOUS ONCE
Refills: 0 | Status: COMPLETED | OUTPATIENT
Start: 2022-12-12 | End: 2022-12-12

## 2022-12-12 RX ORDER — ATORVASTATIN CALCIUM 80 MG/1
80 TABLET, FILM COATED ORAL AT BEDTIME
Refills: 0 | Status: DISCONTINUED | OUTPATIENT
Start: 2022-12-12 | End: 2022-12-15

## 2022-12-12 RX ORDER — SODIUM CHLORIDE 9 MG/ML
1000 INJECTION INTRAMUSCULAR; INTRAVENOUS; SUBCUTANEOUS
Refills: 0 | Status: DISCONTINUED | OUTPATIENT
Start: 2022-12-12 | End: 2022-12-13

## 2022-12-12 RX ORDER — DIPHENHYDRAMINE HCL 50 MG
25 CAPSULE ORAL EVERY 4 HOURS
Refills: 0 | Status: DISCONTINUED | OUTPATIENT
Start: 2022-12-12 | End: 2022-12-13

## 2022-12-12 RX ORDER — ALPRAZOLAM 0.25 MG
0.25 TABLET ORAL ONCE
Refills: 0 | Status: DISCONTINUED | OUTPATIENT
Start: 2022-12-12 | End: 2022-12-12

## 2022-12-12 RX ORDER — METOPROLOL TARTRATE 50 MG
75 TABLET ORAL DAILY
Refills: 0 | Status: DISCONTINUED | OUTPATIENT
Start: 2022-12-12 | End: 2022-12-12

## 2022-12-12 RX ORDER — DEXTROSE 50 % IN WATER 50 %
15 SYRINGE (ML) INTRAVENOUS ONCE
Refills: 0 | Status: DISCONTINUED | OUTPATIENT
Start: 2022-12-12 | End: 2022-12-15

## 2022-12-12 RX ORDER — ASPIRIN/CALCIUM CARB/MAGNESIUM 324 MG
81 TABLET ORAL DAILY
Refills: 0 | Status: DISCONTINUED | OUTPATIENT
Start: 2022-12-12 | End: 2022-12-15

## 2022-12-12 RX ORDER — ACETAMINOPHEN 500 MG
650 TABLET ORAL EVERY 6 HOURS
Refills: 0 | Status: DISCONTINUED | OUTPATIENT
Start: 2022-12-12 | End: 2022-12-15

## 2022-12-12 RX ORDER — NICOTINE POLACRILEX 2 MG
1 GUM BUCCAL DAILY
Refills: 0 | Status: DISCONTINUED | OUTPATIENT
Start: 2022-12-12 | End: 2022-12-15

## 2022-12-12 RX ORDER — PIPERACILLIN AND TAZOBACTAM 4; .5 G/20ML; G/20ML
3.38 INJECTION, POWDER, LYOPHILIZED, FOR SOLUTION INTRAVENOUS ONCE
Refills: 0 | Status: COMPLETED | OUTPATIENT
Start: 2022-12-12 | End: 2022-12-12

## 2022-12-12 RX ORDER — SODIUM CHLORIDE 9 MG/ML
1000 INJECTION, SOLUTION INTRAVENOUS
Refills: 0 | Status: DISCONTINUED | OUTPATIENT
Start: 2022-12-12 | End: 2022-12-15

## 2022-12-12 RX ORDER — PANTOPRAZOLE SODIUM 20 MG/1
40 TABLET, DELAYED RELEASE ORAL
Refills: 0 | Status: DISCONTINUED | OUTPATIENT
Start: 2022-12-12 | End: 2022-12-15

## 2022-12-12 RX ORDER — DIPHENHYDRAMINE HCL 50 MG
25 CAPSULE ORAL ONCE
Refills: 0 | Status: COMPLETED | OUTPATIENT
Start: 2022-12-12 | End: 2022-12-12

## 2022-12-12 RX ORDER — GLUCAGON INJECTION, SOLUTION 0.5 MG/.1ML
1 INJECTION, SOLUTION SUBCUTANEOUS ONCE
Refills: 0 | Status: DISCONTINUED | OUTPATIENT
Start: 2022-12-12 | End: 2022-12-15

## 2022-12-12 RX ORDER — FEXOFENADINE HCL 30 MG
1 TABLET ORAL
Qty: 0 | Refills: 0 | DISCHARGE

## 2022-12-12 RX ADMIN — SODIUM CHLORIDE 1000 MILLILITER(S): 9 INJECTION INTRAMUSCULAR; INTRAVENOUS; SUBCUTANEOUS at 16:39

## 2022-12-12 RX ADMIN — SODIUM CHLORIDE 1000 MILLILITER(S): 9 INJECTION INTRAMUSCULAR; INTRAVENOUS; SUBCUTANEOUS at 15:23

## 2022-12-12 RX ADMIN — Medication 25 MILLIGRAM(S): at 22:31

## 2022-12-12 RX ADMIN — HEPARIN SODIUM 5000 UNIT(S): 5000 INJECTION INTRAVENOUS; SUBCUTANEOUS at 21:01

## 2022-12-12 RX ADMIN — Medication 0.25 MILLIGRAM(S): at 16:34

## 2022-12-12 RX ADMIN — SODIUM CHLORIDE 100 MILLILITER(S): 9 INJECTION INTRAMUSCULAR; INTRAVENOUS; SUBCUTANEOUS at 20:16

## 2022-12-12 RX ADMIN — PIPERACILLIN AND TAZOBACTAM 200 GRAM(S): 4; .5 INJECTION, POWDER, LYOPHILIZED, FOR SOLUTION INTRAVENOUS at 15:16

## 2022-12-12 RX ADMIN — Medication 25 MILLIGRAM(S): at 17:28

## 2022-12-12 NOTE — ED ADULT NURSE NOTE - OBJECTIVE STATEMENT
pt is a 63yo male PMH HTN, CHF, DM, GERD, COPD presenting to the ED complaining of hypotension. per EMS, pt BP found to be 70s/40s, upon arrival pt BP found to be 60s/40s. pt states he has been experiencing generalized weakness, lightheadedness, neck pain, and hypotension for the past 4 days, pt also endorsing diarrhea x4 days, relief achieved following imodium administration. pt dressed in pt gown, placed on cardiac monitor, NS to the 80s. pt A&Ox3 gross neuro intact, no difficulty speaking in complete sentences, s1s2 heart sounds heard, pulses x 4, carlos x4, abdomen soft nontender nondistended, skin intact. pt denies chest pain, sob, ha, n/v/d, abdominal pain, f/c, urinary symptoms, hematuria

## 2022-12-12 NOTE — H&P ADULT - PROBLEM SELECTOR PROBLEM 6
Per cardiology tech, heparin gtt needs to be paused during stress test for approximately 1 hour. Stefania WHITT and Dr. Mo notified. Per Dr. Mo, ok to pause heparin gtt during stress test. Cardiology tech notified. Upon arrival back to unit, get PTT and bolus if not therapeutic.    T2DM (type 2 diabetes mellitus)

## 2022-12-12 NOTE — ED PROVIDER NOTE - CLINICAL SUMMARY MEDICAL DECISION MAKING FREE TEXT BOX
Attending note (Terell): 63 y/o M with h/o HFrEF, HTN, DM, COPD, presenting with generalized fatigue/weakness for the past 3-4 days; associated with mulitple episodes of watery diarrhea; no black or bloody stools. suspect volume decreased status but given h/o HFrEF will perform POCUS to guide vi fluid therapy; obtains screening labs and evaluate for possible occult sepsis/infectious etiology; plan for admission (if remains hypotensive despite initial fluid resuscitation will start pressors and consult with micu/ccu).

## 2022-12-12 NOTE — CONSULT NOTE ADULT - SUBJECTIVE AND OBJECTIVE BOX
CARDIOLOGY FELLOW CONSULT NOTE    HPI:  61yo M with PMHx of HTN, CAD s/p stents (s/p PCIs (most recent NERI to Circumflex in 12/2019), HFrEF s/p AICD, T2DM on insulin, osteo of L toe s/p amputation, recent tobacco use (quit 3mo prior to presentation), who presented:    Prior hospitalization 10/2022 with several weeks of lower extremity edema, orthopnea, and abdominal distension. He was initially on a Bumex gtt, transitioned to IV, with ultimately 24lb weight loss. Continued to diurese well on oral diuretics (bumex 4mg bid), tolerating low dose GDMT (Jardiance, entresto 24/26 bid, toprol XL 75mg daily, spironolactone 50mg). Weight today 248lbs. Plans to discuss outpatient cardiomems.  Interval Events: Outpatient communication with Cardiology as patient with GI illness, weight decreased from 243lb to 238lbs. Told to decrease bumex from 4mg bid to 2mg bid    PMHx:   Stented coronary artery  Diabetes  AICD (automatic cardioverter/defibrillator) present  Hypertension  Heart failure with reduced ejection fraction  History of ischemic cardiomyopathy  History of COPD  H/O gastroesophageal reflux (GERD)    PSHx:   No significant past surgical history  H/O vasectomy    Allergies:  No Known Allergies    Home Meds:    Current Medications:     FAMILY HISTORY:  Family history of COPD (chronic obstructive pulmonary disease) (Sibling)  Family history of cardiac disorder  Paternal    Social History:  Smoking History:  Alcohol Use:  Drug Use:    REVIEW OF SYSTEMS: 14pt ROS neg unless stated above    Physical Exam:  T(F): 97.4 (12-12), Max: 97.5 (12-12)  HR: 90 (12-12) (74 - 90)  BP: 89/64 (12-12) (63/48 - 92/69)  RR: 18 (12-12)  SpO2: 95% (12-12)  GENERAL: No acute distress, well-developed  HEAD:  Atraumatic, Normocephalic  ENT: EOMI, PERRLA, conjunctiva and sclera clear, Neck supple, No JVD, moist mucosa  CHEST/LUNG: Clear to auscultation bilaterally; No wheeze, equal breath sounds bilaterally   BACK: No spinal tenderness  HEART: Regular rate and rhythm; No murmurs, rubs, or gallops  ABDOMEN: Soft, Nontender, Nondistended; Bowel sounds present  EXTREMITIES:  No clubbing, cyanosis, or edema  PSYCH: Nl behavior, nl affect  NEUROLOGY: AAOx3, non-focal, cranial nerves intact  SKIN: Normal color, No rashes or lesions  LINES:    ECG: Personally reviewed    Echo:   10/2022:  Dimensions:    Normal Values:  LA:     4.8    2.0 - 4.0 cm  Ao:     3.3    2.0 - 3.8 cm  SEPTUM: 0.8 0.6 - 1.2 cm  PWT:    0.8    0.6 - 1.1 cm  LVIDd:  6.5    3.0 - 5.6 cm  LVIDs:  6.0    1.8 - 4.0 cm  Derived variables:  LVMI: 85 g/m2  RWT: 0.24  Fractional short: 8 %  EF (Visual Estimate):  %  EF (Bell Rule): 19 %Doppler Peak Velocity (m/sec):  AoV=0.9  ------------------------------------------------------------------------  Observations:  Mitral Valve: Tethered mitral valve leaflets with normal  opening. Mild mitral regurgitation.  Aortic Valve/Aorta: Normal trileaflet aortic valve. Peak  transaortic valve gradient equals 3 mm Hg, mean transaortic  valve gradient equals 2 mm Hg, aortic valve velocity time  integral equals 16 cm. Minimal aortic regurgitation. Peak  left ventricular outflow tract gradient equals 1 mm Hg,  mean gradientis equal to 2 mm Hg, LVOT velocity time  integral equals 9 cm.  Aortic Root: 3.3 cm.  Left Atrium: Normal left atrium.  LA volume index = 32  cc/m2.  Left Ventricle: Endocardial visualization enhanced with  intravenous injection of Ultrasonic Enhancing Agent  (Lumason). Severe global left ventricular systolic  dysfunction with regional variation. No left ventricular  thrombus. Moderate left ventricular enlargement.  Indeterminate diastolic function.  Right Heart: Normal right atrium. The right ventricle is  not well visualized; grossly normal right ventricular size  and systolic function. Tricuspid valve not well visualized,  probably normal. Mild tricuspid regurgitation. Normal  pulmonic valve. Minimal pulmonic regurgitation.  Pericardium/Pleura: Normal pericardium with no pericardial  effusion.  Hemodynamic: Dilated IVC (diameterabout 2.6 cm) with less  than 50% respiratory variation in size consistent with  estimated RA pressure 15 mmHg. Estimated right ventricular  systolic pressure equals 56 mm Hg, assuming right atrial  pressure equals 15 mm Hg, consistent with moderate  pulmonary hypertension.  ------------------------------------------------------------------------  Conclusions:  1. Tethered mitral valve leaflets with normal opening. Mild  mitral regurgitation.  2. Normal trileaflet aortic valve. Minimal aortic  regurgitation.  3. Endocardial visualization enhanced with intravenous  injection of Ultrasonic Enhancing Agent (Lumason). Severe  global left ventricular systolicdysfunction with regional  variation. No left ventricular thrombus.  4. The right ventricle is not well visualized; grossly  normal right ventricular size and systolic function.  5. Estimated pulmonary artery systolic pressure equals 56  mm Hg, assuming right atrial pressure equals 15 mm Hg,  consistent with moderate pulmonary pressures.  *** Compared with echocardiogram of 9/9/2020, results are  similar on today's study. Estimated PA systolic pressure is  consistent with moderate pulmonary hypertenson on today's  study. Pulmonary pressure was not estimated on the prior  study.    Stress Testing: Personally reviewed    Cath 2019 (Lorri)  CORONARY VESSELS: The coronary circulation is right dominant.  LM:  --  LM: Normal.  LAD:   --  Ostial LAD: There was a 100 % stenosis.  CX:   --  Distal circumflex: There was a 80 % stenosis.  RI:   --  Ramus intermedius: Normal. There was no significant restenosis.  RCA:   --  Mid RCA: There was a 50 % stenosis.    CXR: Personally reviewed    Labs: Personally reviewed                        12.4   7.44  )-----------( 199      ( 12 Dec 2022 14:39 )             41.0     12-12    131<L>  |  96  |  145<H>  ----------------------------<  117<H>  5.3   |  17<L>  |  3.22<H>    Ca    9.4      12 Dec 2022 14:39    TPro  8.4<H>  /  Alb  4.2  /  TBili  0.3  /  DBili  x   /  AST  14  /  ALT  17  /  AlkPhos  119  12-12    PT/INR - ( 12 Dec 2022 14:39 )   PT: 12.8 sec;   INR: 1.11 ratio         PTT - ( 12 Dec 2022 14:39 )  PTT:31.1 sec    CARDIAC MARKERS ( 12 Dec 2022 16:16 )  88 ng/L / x     / x     / x     / x     / x      CARDIAC MARKERS ( 12 Dec 2022 14:39 )  98 ng/L / x     / x     / x     / x     / x            Serum Pro-Brain Natriuretic Peptide: 803 pg/mL (12-12 @ 14:39)     CARDIOLOGY FELLOW CONSULT NOTE    HPI:  63yo M with PMHx of HTN, CAD s/p stents (s/p PCIs (most recent NERI to Circumflex in 12/2019), HFrEF s/p AICD ( S-ICD, prior lead extraction), T2DM on insulin, osteo of L toe s/p amputation, recent tobacco use (quit 3mo prior to presentation), who presented with GI illness (diarrhea 3-4x a day, nonbloody, watery) since Thursday last week. This was in the setting of his daughter having flu like illness 1.5 weeks ago.  Patient with lightheadedness and dizziness in this setting. Does not have worsening swelling of his legs, has his baseline orthopnea (2-3 pillows), no abdominal swelling. Reached out to outpatient cardiology whereby recommended to decrease bumex 4mg bid to 2mg bid. That said, he also cites a recent increase in entresto dosage to 49/51mg bid which sometimes causes him to feel a little dizzy as well, and this preceded his GI illness.   His standing weight has decreased from baseline 243lbs to 237lbs today.   ROS neg for any fevers, chest pain, dyspnea on exertion. No ICD shocks. + for LE discoloration (previously treated for cellulitis but likely stasis dermatitis at this point).    Prior hospitalization 10/2022 with several weeks of lower extremity edema, orthopnea, and abdominal distension. He was initially on a Bumex gtt, transitioned to IV, with ultimately 24lb weight loss. Continued to diurese well on oral diuretics (bumex 4mg bid), tolerating low dose GDMT (Jardiance, entresto 24/26 bid, toprol XL 75mg daily, spironolactone 50mg). Weight today 248lbs. Plans to discuss outpatient cardiomems.    Seen in ED where his BP is 90s/70s with Na 131, Cre 3.22 (from 1.76), interval decrease in proBNP from 1852 to 803.  Lactate 1.5 > 3.0  He is on room air and lungs are clear. XR is clear    PMHx:   Stented coronary artery  Diabetes  AICD (automatic cardioverter/defibrillator) present  Hypertension  Heart failure with reduced ejection fraction  History of ischemic cardiomyopathy  History of COPD  H/O gastroesophageal reflux (GERD)    PSHx:   No significant past surgical history  H/O vasectomy    Allergies:  No Known Allergies    Home Meds:    Current Medications:     FAMILY HISTORY:  Family history of COPD (chronic obstructive pulmonary disease) (Sibling)  Family history of cardiac disorder  Paternal    Social History:  Smoking History:  Alcohol Use:  Drug Use:    REVIEW OF SYSTEMS: 14pt ROS neg unless stated above    Physical Exam:  T(F): 97.4 (12-12), Max: 97.5 (12-12)  HR: 90 (12-12) (74 - 90)  BP: 89/64 (12-12) (63/48 - 92/69)  RR: 18 (12-12)  SpO2: 95% (12-12)  GENERAL: No acute distress, well-developed  HEAD:  Atraumatic, Normocephalic  ENT: EOMI, PERRLA, conjunctiva and sclera clear, Neck supple, No JVD, moist mucosa  CHEST/LUNG: Clear to auscultation bilaterally; No wheeze, equal breath sounds bilaterally   BACK: No spinal tenderness  HEART: Regular rate and rhythm; No murmurs, rubs, or gallops. + Nicotine patch   ABDOMEN: Soft, Nontender, Nondistended; Bowel sounds present  EXTREMITIES:  no edema, stasis dermatitis   PSYCH: Nl behavior, nl affect  NEUROLOGY: AAOx3, non-focal, cranial nerves intact  SKIN: Normal color, No rashes or lesions  LINES:    ECG: sinus rhythm, left axis, delayed RW progression, incomplete RBBB    Echo:   10/2022:  Dimensions:    Normal Values:  LA:     4.8    2.0 - 4.0 cm  Ao:     3.3    2.0 - 3.8 cm  SEPTUM: 0.8 0.6 - 1.2 cm  PWT:    0.8    0.6 - 1.1 cm  LVIDd:  6.5    3.0 - 5.6 cm  LVIDs:  6.0    1.8 - 4.0 cm  Derived variables:  LVMI: 85 g/m2  RWT: 0.24  Fractional short: 8 %  EF (Visual Estimate):  %  EF (Bell Rule): 19 %Doppler Peak Velocity (m/sec):  AoV=0.9  ------------------------------------------------------------------------  Observations:  Mitral Valve: Tethered mitral valve leaflets with normal  opening. Mild mitral regurgitation.  Aortic Valve/Aorta: Normal trileaflet aortic valve. Peak  transaortic valve gradient equals 3 mm Hg, mean transaortic  valve gradient equals 2 mm Hg, aortic valve velocity time  integral equals 16 cm. Minimal aortic regurgitation. Peak  left ventricular outflow tract gradient equals 1 mm Hg,  mean gradientis equal to 2 mm Hg, LVOT velocity time  integral equals 9 cm.  Aortic Root: 3.3 cm.  Left Atrium: Normal left atrium.  LA volume index = 32  cc/m2.  Left Ventricle: Endocardial visualization enhanced with  intravenous injection of Ultrasonic Enhancing Agent  (Lumason). Severe global left ventricular systolic  dysfunction with regional variation. No left ventricular  thrombus. Moderate left ventricular enlargement.  Indeterminate diastolic function.  Right Heart: Normal right atrium. The right ventricle is  not well visualized; grossly normal right ventricular size  and systolic function. Tricuspid valve not well visualized,  probably normal. Mild tricuspid regurgitation. Normal  pulmonic valve. Minimal pulmonic regurgitation.  Pericardium/Pleura: Normal pericardium with no pericardial  effusion.  Hemodynamic: Dilated IVC (diameterabout 2.6 cm) with less  than 50% respiratory variation in size consistent with  estimated RA pressure 15 mmHg. Estimated right ventricular  systolic pressure equals 56 mm Hg, assuming right atrial  pressure equals 15 mm Hg, consistent with moderate  pulmonary hypertension.  ------------------------------------------------------------------------  Conclusions:  1. Tethered mitral valve leaflets with normal opening. Mild  mitral regurgitation.  2. Normal trileaflet aortic valve. Minimal aortic  regurgitation.  3. Endocardial visualization enhanced with intravenous  injection of Ultrasonic Enhancing Agent (Lumason). Severe  global left ventricular systolicdysfunction with regional  variation. No left ventricular thrombus.  4. The right ventricle is not well visualized; grossly  normal right ventricular size and systolic function.  5. Estimated pulmonary artery systolic pressure equals 56  mm Hg, assuming right atrial pressure equals 15 mm Hg,  consistent with moderate pulmonary pressures.  *** Compared with echocardiogram of 9/9/2020, results are  similar on today's study. Estimated PA systolic pressure is  consistent with moderate pulmonary hypertenson on today's  study. Pulmonary pressure was not estimated on the prior  study.    Stress Testing: Personally reviewed    Cath 2019 (Lorri)  CORONARY VESSELS: The coronary circulation is right dominant.  LM:  --  LM: Normal.  LAD:   --  Ostial LAD: There was a 100 % stenosis.  CX:   --  Distal circumflex: There was a 80 % stenosis.  RI:   --  Ramus intermedius: Normal. There was no significant restenosis.  RCA:   --  Mid RCA: There was a 50 % stenosis.    CXR: Personally reviewed    Labs: Personally reviewed                        12.4   7.44  )-----------( 199      ( 12 Dec 2022 14:39 )             41.0     12-12    131<L>  |  96  |  145<H>  ----------------------------<  117<H>  5.3   |  17<L>  |  3.22<H>    Ca    9.4      12 Dec 2022 14:39    TPro  8.4<H>  /  Alb  4.2  /  TBili  0.3  /  DBili  x   /  AST  14  /  ALT  17  /  AlkPhos  119  12-12    PT/INR - ( 12 Dec 2022 14:39 )   PT: 12.8 sec;   INR: 1.11 ratio         PTT - ( 12 Dec 2022 14:39 )  PTT:31.1 sec    CARDIAC MARKERS ( 12 Dec 2022 16:16 )  88 ng/L / x     / x     / x     / x     / x      CARDIAC MARKERS ( 12 Dec 2022 14:39 )  98 ng/L / x     / x     / x     / x     / x            Serum Pro-Brain Natriuretic Peptide: 803 pg/mL (12-12 @ 14:39)

## 2022-12-12 NOTE — H&P ADULT - ASSESSMENT
62M PMH HFrEF(last EF-19%;10/2022) s/p AICD, CAD s/p Stents, T2DM, HTN, COPD p/w 4-day h/o generalized weakness, fatigue and diarrhea.

## 2022-12-12 NOTE — H&P ADULT - NSHPREVIEWOFSYSTEMS_GEN_ALL_CORE
GEN: no night sweats or change in appetite  EYES: no changes in vision or diplopia   ENT: no epistaxis, sinus pain, gingival bleeding, odynophagia or dysphagia  CV: no CP, PND or palpitations  RESP: no cough, wheezing, or hemoptysis  GI: no hematemesis, hematochezia, or melena  : no dysuria, polyuria, or hematuria  MSK: no arthralgias or joint swelling   NEURO: no gross sensory changes, numbness, focal deficits  PSYCH: no depression or changes in concentration  HEME/ONC: no purpura, petechiae or night sweats  SKIN: no pruritus, hair loss or skin lesions GEN: no night sweats or change in appetite  EYES: +changes in vision, no diplopia   ENT: no epistaxis, sinus pain, gingival bleeding, odynophagia or dysphagia  CV: no CP, PND or palpitations  RESP: no cough, wheezing, or hemoptysis  GI: +diarrhea ,no hematemesis, hematochezia, or melena  : no dysuria, polyuria, or hematuria  MSK: no arthralgias or joint swelling   NEURO: no gross sensory changes, numbness, focal deficits  PSYCH: no depression or changes in concentration  SKIN: +pruritus, no hair loss or skin lesions

## 2022-12-12 NOTE — ED PROVIDER NOTE - UNABLE TO OBTAIN
Urgent need for Intervention hypotension

## 2022-12-12 NOTE — ED ADULT NURSE REASSESSMENT NOTE - NS ED NURSE REASSESS COMMENT FT1
pt resting comfortably in stretcher, maintaining oxygen saturation of 97% on RA, no acute distress, denies complaints at this time

## 2022-12-12 NOTE — ED PROCEDURE NOTE - ATTENDING CONTRIBUTION TO CARE
I, Elvin Manriquez, performed a history and physical exam of the patient and discussed their management with the resident and /or advanced care provider. I reviewed the resident and /or ACP's note and agree with the documented findings and plan of care. I was present and available for all procedures.

## 2022-12-12 NOTE — H&P ADULT - PROBLEM SELECTOR PLAN 5
-compensated  -per heart failure, will hold GDMT including entresto, spironolactone and toprol in light of hypotension  -hold bumex   -daily wts, monitor I/Os  -TTE ordered  -tele monitoring

## 2022-12-12 NOTE — H&P ADULT - NSHPPHYSICALEXAM_GEN_ALL_CORE
Vital Signs Last 24 Hrs  T(C): 36.6 (12 Dec 2022 19:16), Max: 36.6 (12 Dec 2022 19:16)  T(F): 97.9 (12 Dec 2022 19:16), Max: 97.9 (12 Dec 2022 19:16)  HR: 92 (12 Dec 2022 19:16) (74 - 92)  BP: 102/64 (12 Dec 2022 19:16) (63/48 - 156/136)  BP(mean): 75 (12 Dec 2022 19:16) (64 - 143)  RR: 20 (12 Dec 2022 19:16) (13 - 20)  SpO2: 98% (12 Dec 2022 19:16) (88% - 100%)    Parameters below as of 12 Dec 2022 19:16  Patient On (Oxygen Delivery Method): room air

## 2022-12-12 NOTE — H&P ADULT - PROBLEM SELECTOR PLAN 3
-multifactorial etio including acute diarrhea, poor po intake, overdiuresis, antiHtN meds  -now resolved s/p IV hydration  -c/w gentle hydration  -entresto, toprol, spironolactone and bumex held per heart failure team

## 2022-12-12 NOTE — ED PROVIDER NOTE - PHYSICAL EXAMINATION
On Physical Exam:  General: awake/alert, in NAD, speaking clearly in full sentences and without difficulty; cooperative with exam  HEENT: anicteric sclera, airway patent  Neck: no neck tenderness, no nuchal rigidity  Cardiac: tachy 100-110, regular  Lungs: CTABL  Abdomen: soft nontender/nondistended  : no bladder tenderness or distension  Skin: intact, no rash  Extremities: no peripheral edema, no gross deformities

## 2022-12-12 NOTE — ED ADULT NURSE REASSESSMENT NOTE - NS ED NURSE REASSESS COMMENT FT1
pt found to be hypertensive to 150s/130s in the right arm, pt BP rechecked in right arm, BP maintained at 150s/130s, BP checked in left arm, BP maintained at 150s/130s, MD Butt notified, pending orders.

## 2022-12-12 NOTE — CONSULT NOTE ADULT - ASSESSMENT
61yo M with PMHx of HTN, CAD s/p stents (s/p PCIs (most recent NERI to Circumflex in 12/2019), HFrEF (19%) s/p AICD (S-ICD, prior lead extraction), T2DM on insulin, osteo of L toe s/p amputation, recent tobacco use (quit 3mo prior to presentation), who presented with GI illness (diarrhea 3-4x a day, nonbloody, watery) since Thursday last week, with continued outpatient GDMT and diuretics.  overall impression, in light of serologic markers (proBNP interval improvement from last discharge, pre-renal MANJIT), clinical history, radiography (Clear XR) consistent with hypovolemia    Plan:  - Continue IVF resuscitation, gentle  - f/u interval TTE, at present time, no concern for cardiogenic component of shock (normal LFTs)  - Hold GDMT (entresto, spironolactone and toprol) in light of hemodynamics, MANJIT and mild hyperkalemia to 5.3  - Hold diuretics, Daily weights  - HF service will follow in the AM. Outpatient cardiomems is pending  - Stool culture, infectious workup as per primary team.    Plan was discussed with CICU attending Dr. Amaral. No CICU indications at present time.  Plan for reassessment after fluid resuscitation in the ED  61yo M with PMHx of HTN, CAD s/p stents (s/p PCIs (most recent NERI to Circumflex in 12/2019), HFrEF (19%) s/p AICD (S-ICD, prior lead extraction), T2DM on insulin, osteo of L toe s/p amputation, recent tobacco use (quit 3mo prior to presentation), who presented with GI illness (diarrhea 3-4x a day, nonbloody, watery) since Thursday last week, with continued outpatient GDMT and diuretics.  overall impression, in light of serologic markers (proBNP interval improvement from last discharge, pre-renal MANJIT), clinical history, radiography (Clear XR) consistent with hypovolemia    Plan:  - Continue IVF resuscitation, gentle  - f/u interval TTE, at present time, no concern for cardiogenic component of shock (normal LFTs)  - Hold GDMT (entresto, spironolactone and toprol) in light of hemodynamics, MANJIT and mild hyperkalemia to 5.3. Hyperkalemia cocktail (sans diuretics) PRN   - Hold diuretics, Daily weights  - HF service will follow in the AM. Outpatient cardiomems is pending  - Stool culture, infectious workup as per primary team.    Plan was discussed with CICU attending Dr. Amaral. No CICU indications at present time.  Plan for reassessment after fluid resuscitation in the ED

## 2022-12-12 NOTE — H&P ADULT - PROBLEM SELECTOR PLAN 4
-likely prerenal in s/o diarrhea, overdiuresis  -c/w gentle IV hydration , encourage PO intake  -will hold entresto, spironolactone, bumex and toprol per heart failure team  -monitor daily Cr  -can pursue further w/u if not improving  -avoid nephrotoxins and renally dose meds

## 2022-12-12 NOTE — CONSULT NOTE ADULT - SUBJECTIVE AND OBJECTIVE BOX
· Subjective and Objective:   CARDIOLOGY FELLOW CONSULT NOTE    HPI:  61yo M with PMHx of HTN, CAD s/p stents (s/p PCIs (most recent NERI to Circumflex in 12/2019), HFrEF s/p AICD ( S-ICD, prior lead extraction), T2DM on insulin, osteo of L toe s/p amputation, recent tobacco use (quit 3mo prior to presentation), who presented with GI illness (diarrhea 3-4x a day, nonbloody, watery) since Thursday last week. This was in the setting of his daughter having flu like illness 1.5 weeks ago.  Patient with lightheadedness and dizziness in this setting. Does not have worsening swelling of his legs, has his baseline orthopnea (2-3 pillows), no abdominal swelling. Reached out to outpatient cardiology whereby recommended to decrease bumex 4mg bid to 2mg bid. That said, he also cites a recent increase in entresto dosage to 49/51mg bid which sometimes causes him to feel a little dizzy as well, and this preceded his GI illness.   His standing weight has decreased from baseline 243lbs to 237lbs today.   ROS neg for any fevers, chest pain, dyspnea on exertion. No ICD shocks. + for LE discoloration (previously treated for cellulitis but likely stasis dermatitis at this point).    Prior hospitalization 10/2022 with several weeks of lower extremity edema, orthopnea, and abdominal distension. He was initially on a Bumex gtt, transitioned to IV, with ultimately 24lb weight loss. Continued to diurese well on oral diuretics (bumex 4mg bid), tolerating low dose GDMT (Jardiance, entresto 24/26 bid, toprol XL 75mg daily, spironolactone 50mg). Weight today 248lbs. Plans to discuss outpatient cardiomems.    Seen in ED where his BP is 90s/70s with Na 131, Cre 3.22 (from 1.76), interval decrease in proBNP from 1852 to 803.  Lactate 1.5 > 3.0  He is on room air and lungs are clear. XR is clear    PMHx:   Stented coronary artery  Diabetes  AICD (automatic cardioverter/defibrillator) present  Hypertension  Heart failure with reduced ejection fraction  History of ischemic cardiomyopathy  History of COPD  H/O gastroesophageal reflux (GERD)    PSHx:   No significant past surgical history  H/O vasectomy    Allergies:  No Known Allergies    Home Meds:    Current Medications:     FAMILY HISTORY:  Family history of COPD (chronic obstructive pulmonary disease) (Sibling)  Family history of cardiac disorder  Paternal    Social History:  Smoking History:  Alcohol Use:  Drug Use:    REVIEW OF SYSTEMS: 14pt ROS neg unless stated above    Physical Exam:  T(F): 97.4 (12-12), Max: 97.5 (12-12)  HR: 90 (12-12) (74 - 90)  BP: 89/64 (12-12) (63/48 - 92/69)  RR: 18 (12-12)  SpO2: 95% (12-12)  GENERAL: No acute distress, well-developed  HEAD:  Atraumatic, Normocephalic  ENT: EOMI, PERRLA, conjunctiva and sclera clear, Neck supple, No JVD, moist mucosa  CHEST/LUNG: Clear to auscultation bilaterally; No wheeze, equal breath sounds bilaterally   BACK: No spinal tenderness  HEART: Regular rate and rhythm; No murmurs, rubs, or gallops. + Nicotine patch   ABDOMEN: Soft, Nontender, Nondistended; Bowel sounds present  EXTREMITIES:  no edema, stasis dermatitis   PSYCH: Nl behavior, nl affect  NEUROLOGY: AAOx3, non-focal, cranial nerves intact  SKIN: Normal color, No rashes or lesions  LINES:    ECG: sinus rhythm, left axis, delayed RW progression, incomplete RBBB    Echo:   10/2022:  Dimensions:    Normal Values:  LA:     4.8    2.0 - 4.0 cm  Ao:     3.3    2.0 - 3.8 cm  SEPTUM: 0.8 0.6 - 1.2 cm  PWT:    0.8    0.6 - 1.1 cm  LVIDd:  6.5    3.0 - 5.6 cm  LVIDs:  6.0    1.8 - 4.0 cm  Derived variables:  LVMI: 85 g/m2  RWT: 0.24  Fractional short: 8 %  EF (Visual Estimate):  %  EF (Bell Rule): 19 %Doppler Peak Velocity (m/sec):  AoV=0.9  ------------------------------------------------------------------------  Observations:  Mitral Valve: Tethered mitral valve leaflets with normal  opening. Mild mitral regurgitation.  Aortic Valve/Aorta: Normal trileaflet aortic valve. Peak  transaortic valve gradient equals 3 mm Hg, mean transaortic  valve gradient equals 2 mm Hg, aortic valve velocity time  integral equals 16 cm. Minimal aortic regurgitation. Peak  left ventricular outflow tract gradient equals 1 mm Hg,  mean gradientis equal to 2 mm Hg, LVOT velocity time  integral equals 9 cm.  Aortic Root: 3.3 cm.  Left Atrium: Normal left atrium.  LA volume index = 32  cc/m2.  Left Ventricle: Endocardial visualization enhanced with  intravenous injection of Ultrasonic Enhancing Agent  (Lumason). Severe global left ventricular systolic  dysfunction with regional variation. No left ventricular  thrombus. Moderate left ventricular enlargement.  Indeterminate diastolic function.  Right Heart: Normal right atrium. The right ventricle is  not well visualized; grossly normal right ventricular size  and systolic function. Tricuspid valve not well visualized,  probably normal. Mild tricuspid regurgitation. Normal  pulmonic valve. Minimal pulmonic regurgitation.  Pericardium/Pleura: Normal pericardium with no pericardial  effusion.  Hemodynamic: Dilated IVC (diameterabout 2.6 cm) with less  than 50% respiratory variation in size consistent with  estimated RA pressure 15 mmHg. Estimated right ventricular  systolic pressure equals 56 mm Hg, assuming right atrial  pressure equals 15 mm Hg, consistent with moderate  pulmonary hypertension.  ------------------------------------------------------------------------  Conclusions:  1. Tethered mitral valve leaflets with normal opening. Mild  mitral regurgitation.  2. Normal trileaflet aortic valve. Minimal aortic  regurgitation.  3. Endocardial visualization enhanced with intravenous  injection of Ultrasonic Enhancing Agent (Lumason). Severe  global left ventricular systolicdysfunction with regional  variation. No left ventricular thrombus.  4. The right ventricle is not well visualized; grossly  normal right ventricular size and systolic function.  5. Estimated pulmonary artery systolic pressure equals 56  mm Hg, assuming right atrial pressure equals 15 mm Hg,  consistent with moderate pulmonary pressures.  *** Compared with echocardiogram of 9/9/2020, results are  similar on today's study. Estimated PA systolic pressure is  consistent with moderate pulmonary hypertenson on today's  study. Pulmonary pressure was not estimated on the prior  study.    Stress Testing: Personally reviewed    Cath 2019 (Lorri)  CORONARY VESSELS: The coronary circulation is right dominant.  LM:  --  LM: Normal.  LAD:   --  Ostial LAD: There was a 100 % stenosis.  CX:   --  Distal circumflex: There was a 80 % stenosis.  RI:   --  Ramus intermedius: Normal. There was no significant restenosis.  RCA:   --  Mid RCA: There was a 50 % stenosis.    CXR: Personally reviewed    Labs: Personally reviewed                        12.4   7.44  )-----------( 199      ( 12 Dec 2022 14:39 )             41.0     12-12    131<L>  |  96  |  145<H>  ----------------------------<  117<H>  5.3   |  17<L>  |  3.22<H>    Ca    9.4      12 Dec 2022 14:39    TPro  8.4<H>  /  Alb  4.2  /  TBili  0.3  /  DBili  x   /  AST  14  /  ALT  17  /  AlkPhos  119  12-12    PT/INR - ( 12 Dec 2022 14:39 )   PT: 12.8 sec;   INR: 1.11 ratio         PTT - ( 12 Dec 2022 14:39 )  PTT:31.1 sec    CARDIAC MARKERS ( 12 Dec 2022 16:16 )  88 ng/L / x     / x     / x     / x     / x      CARDIAC MARKERS ( 12 Dec 2022 14:39 )  98 ng/L / x     / x     / x     / x     / x            Serum Pro-Brain Natriuretic Peptide: 803 pg/mL (12-12 @ 14:39)

## 2022-12-12 NOTE — ED PROVIDER NOTE - CARE PLAN
Principal Discharge DX:	Fatigue  Secondary Diagnosis:	MANJIT (acute kidney injury)  Secondary Diagnosis:	Hypotension   1

## 2022-12-12 NOTE — ED PROVIDER NOTE - ATTENDING CONTRIBUTION TO CARE
Attending note (Terell): 61 y/o M with h/o HFrEF, HTN, DM, COPD, presenting with generalized fatigue/weakness for the past 3-4 days; associated with mulitple episodes of watery diarrhea; no black or bloody stools. suspect volume decreased status but given h/o HFrEF will perform POCUS to guide vi fluid therapy; obtains screening labs and evaluate for possible occult sepsis/infectious etiology; plan for admission (if remains hypotensive despite initial fluid resuscitation will start pressors and consult with micu/ccu)

## 2022-12-12 NOTE — CONSULT NOTE ADULT - ASSESSMENT
63yo M with PMHx of HTN, CAD s/p stents (s/p PCIs (most recent NERI to Circumflex in 12/2019), HFrEF (19%) s/p AICD (S-ICD, prior lead extraction), T2DM on insulin, osteo of L toe s/p amputation, recent tobacco use (quit 3mo prior to presentation), who presented with GI illness (diarrhea 3-4x a day, nonbloody, watery) since Thursday last week, with continued (albeit de-escalated) outpatient GDMT and diuretics.  Overall impression, in light of serologic markers (proBNP interval improvement from last discharge, pre-renal MANJIT), clinical history, radiography (Clear XR) consistent with hypovolemia    Plan:  - Continue IVF resuscitation, gentle in light of low EF   - f/u interval TTE, at present time, no concern for cardiogenic component of shock (normal LFTs)  - Hold GDMT (entresto, spironolactone and toprol) in light of hemodynamics, MANJIT and mild hyperkalemia to 5.3  - Hold diuretics, Daily weights  - HF service will follow in the AM. Outpatient cardiomems is pending  - Stool culture, infectious workup as per primary team.    Plan was discussed with CICU attending Dr. Amaral. No CICU indications at present time.  Plan for reassessment after fluid resuscitation in the ED            63yo M with PMHx of HTN, CAD s/p stents (s/p PCIs (most recent NERI to Circumflex in 12/2019), HFrEF (19%) s/p AICD (S-ICD, prior lead extraction), T2DM on insulin, osteo of L toe s/p amputation, recent tobacco use (quit 3mo prior to presentation), who presented with GI illness (diarrhea 3-4x a day, nonbloody, watery) since Thursday last week, with continued (albeit de-escalated) outpatient GDMT and diuretics.  Overall impression, in light of serologic markers (proBNP interval improvement from last discharge, pre-renal MANJIT), clinical history, radiography (Clear XR) consistent with hypovolemia    Plan:  - Continue IVF resuscitation, gentle in light of low EF   - f/u interval TTE, at present time, no concern for cardiogenic component of shock (normal LFTs)  - Hold GDMT (entresto, spironolactone and toprol) in light of hemodynamics, MANJIT and mild hyperkalemia to 5.3. Hyperkalemia cocktail (sans diuretics) PRN   - Hold diuretics, Daily weights  - HF service will follow in the AM. Outpatient cardiomems is pending  - Stool culture, infectious workup as per primary team.    Plan was discussed with CICU attending Dr. Amaral. No CICU indications at present time.  Plan for reassessment after fluid resuscitation in the ED

## 2022-12-12 NOTE — ED ADULT NURSE REASSESSMENT NOTE - NS ED NURSE REASSESS COMMENT FT1
pt found to be hypoxic to the 80s%, complaining of nausea, pt vomited into emesis bag. pt placed on 2L nc, no improvement, pt placed on 6L with some improvement to 93%, MD Butt and MD Manriquez at bedside assessing pt.

## 2022-12-12 NOTE — ED PROVIDER NOTE - OBJECTIVE STATEMENT
Attending note (Terell): 63 y/o M with h/o HFrEF, HTN, DM, COPD, presenting with generalized fatigue/weakness for the past 3-4 days; associated with mulitple episodes of watery diarrhea; no black or bloody stools. states few episodes of watery diarrhea per day past few days stopped after taking an imodium. no abd pain, no fever, no vomiting. states that his medications were adjusted 1 month ago, did not take any additional medications/extra doses today.

## 2022-12-12 NOTE — ED PROVIDER NOTE - PROGRESS NOTE DETAILS
Filomena, PGY3: Patient received as sign out, initially presented with generalized fatigue and hypotension. Patient with history of systolic HF however appears over-diuresed at was recently transitioned from lasix to bumex and now with several days of diarrhea. Patient reassessed with improved blood pressure after IV hydration ~500cc, MAP 60s-70s, fatigue improving. Updated on results and plan for admission. Shortly after reassessment, called to bedside as patient had episode of vomiting and chest pain. EKG repeated immediately, without acute ischemic changes. Patient remains without B line predominance, IVC with respiratory variability, now with 1 L complete. Patient placed on NRB for hypoxia. Spoke with Dr. Cruz patient PMD, will see patient shortly. WIll repeat labs prior to admission. Filomena, PGY3: Dr. Cruz at bedside. Lactate and acidemia worsening. Will get CCU consult. Filomena, PGY3: Blood pressure stabilized 100s/70s. Patient appears well, no further chest pain. Not a CCU candidate at this time. Will repeat labs and if improving with admit to tele. Filomena, PGY3: BLood gas and lactate improving. MAPs 70s. Spoke with Dr. Cruz, will admit.

## 2022-12-12 NOTE — H&P ADULT - HISTORY OF PRESENT ILLNESS
62M PMH HFrEF(last EF-19%;10/2022) s/p AICD, CAD s/p Stents, T2DM, HTN, COPD p/w 4-day h/o generalized weakness and fatigue and diarrhea. Pt reports having multiple voluminous watery non-bloody stools(3-4x/day) for the past few days. He has had associated lightheadedness, dizziness and vision changes. Diarrhea had temporarily resolved after he took Imodium but started again a day later. Of note, pts daughter had flu-like illness about 4-weeks ago. He’s also had recent changes in his CHF medication regimen including uptitration of his entresto and dec in his bumex and spironolactone. He’s had dec fluid intake eating only ice-chips. He had checked his BP at home which was in the sys 60s and he decided to come in for further evaluation. His last hospitalization was here back in October. Denied possible food association, recent abx use, recent travel, fevers, abdominal pain   ED course: Afebrile. Hypotensive 63/48à102/64. Hypoxic 88% placed on 2LNC->93%->6LNC->NRB, improved to nl sat on RA. Given 2LNS boluses, Zosyn, Xanax, Benadryl. CCU consulted for hypovolemic shock. Pt had also complained of pruritus that started after receiving zosyn.

## 2022-12-12 NOTE — H&P ADULT - PROBLEM SELECTOR PLAN 7
-antiHTN being held in s/o hypotension  -continue to monitor and resume meds gradually if persistently hypertensive

## 2022-12-12 NOTE — H&P ADULT - NSHPLABSRESULTS_GEN_ALL_CORE
12.4   7.44  )-----------( 199      ( 12 Dec 2022 14:39 )             41.0           130<L>  |  100  |  137<H>  ----------------------------<  148<H>  5.4<H>   |  14<L>  |  2.88<H>    Ca    9.1      12 Dec 2022 19:24    TPro  8.3  /  Alb  4.4  /  TBili  0.3  /  DBili  x   /  AST  16  /  ALT  15  /  AlkPhos  120  12-12            LIVER FUNCTIONS - ( 12 Dec 2022 19:24 )  Alb: 4.4 g/dL / Pro: 8.3 g/dL / ALK PHOS: 120 U/L / ALT: 15 U/L / AST: 16 U/L / GGT: x             PT/INR - ( 12 Dec 2022 14:39 )   PT: 12.8 sec;   INR: 1.11 ratio         PTT - ( 12 Dec 2022 14:39 )  PTT:31.1 sec    Urinalysis Basic - ( 12 Dec 2022 16:28 )    Color: Light Yellow / Appearance: Clear / S.014 / pH: x  Gluc: x / Ketone: Negative  / Bili: Negative / Urobili: Negative   Blood: x / Protein: Trace / Nitrite: Negative   Leuk Esterase: Negative / RBC: 2 /hpf / WBC 1 /HPF   Sq Epi: x / Non Sq Epi: 0 /hpf / Bacteria: Negative    I have personally reviewed imaging  I have personally reviewed EKG 12.4   7.44  )-----------( 199      ( 12 Dec 2022 14:39 )             41.0       12    130<L>  |  100  |  137<H>  ----------------------------<  148<H>  5.4<H>   |  14<L>  |  2.88<H>    Ca    9.1      12 Dec 2022 19:24    TPro  8.3  /  Alb  4.4  /  TBili  0.3  /  DBili  x   /  AST  16  /  ALT  15  /  AlkPhos  120  12-12            LIVER FUNCTIONS - ( 12 Dec 2022 19:24 )  Alb: 4.4 g/dL / Pro: 8.3 g/dL / ALK PHOS: 120 U/L / ALT: 15 U/L / AST: 16 U/L / GGT: x             PT/INR - ( 12 Dec 2022 14:39 )   PT: 12.8 sec;   INR: 1.11 ratio         PTT - ( 12 Dec 2022 14:39 )  PTT:31.1 sec    Urinalysis Basic - ( 12 Dec 2022 16:28 )    Color: Light Yellow / Appearance: Clear / S.014 / pH: x  Gluc: x / Ketone: Negative  / Bili: Negative / Urobili: Negative   Blood: x / Protein: Trace / Nitrite: Negative   Leuk Esterase: Negative / RBC: 2 /hpf / WBC 1 /HPF   Sq Epi: x / Non Sq Epi: 0 /hpf / Bacteria: Negative    I have personally reviewed imaging,CXR with clear lungs  I have personally reviewed EKG, NSR at 71bpm, no acute ST changes noted

## 2022-12-12 NOTE — CONSULT NOTE ADULT - ATTENDING COMMENTS
61 y/o male with h/o chronic systolic HF ACC/AHA stage C-D, ICMP LVEF 10-15% LVIDd 7cm, CAD s/p anterior wall STEMI ‘PCI to large RI ’18 PCI to LCx ‘19, single chamber ICD s/p extraction due to bacteremia followed by s/p S-ICD implant ‘20, HTN, recent tobacco use (quit 9/2022), COPD, LUIS ALFREDO not on CPAP, DM 2 (a1c 7%), L toe osteomyelitis s/p amputation 1/2022, CKD 3 and h/o right mod-sev hydronephrosis who comes to the hospital after days of profuse watery diarrhea. In the ED he was noted to be hypotensive to the 60s and he was also noted to have an MANJIT to the mid 2s (baseline 1.5-1.9). He was givne gentle fluid resuscitation with improvement in this BP and creatinine. His GDMT has been held  Will slowly introduce his GDMT over the next couple of days.  He was scheduled to undergo a RHC and cardiomems placement, will discuss with patient if he would like to undergo this procedure while he is here

## 2022-12-13 LAB
ANION GAP SERPL CALC-SCNC: 15 MMOL/L — SIGNIFICANT CHANGE UP (ref 5–17)
BUN SERPL-MCNC: 107 MG/DL — HIGH (ref 7–23)
CALCIUM SERPL-MCNC: 9.3 MG/DL — SIGNIFICANT CHANGE UP (ref 8.4–10.5)
CHLORIDE SERPL-SCNC: 102 MMOL/L — SIGNIFICANT CHANGE UP (ref 96–108)
CO2 SERPL-SCNC: 15 MMOL/L — LOW (ref 22–31)
CREAT SERPL-MCNC: 2.19 MG/DL — HIGH (ref 0.5–1.3)
CULTURE RESULTS: SIGNIFICANT CHANGE UP
EGFR: 34 ML/MIN/1.73M2 — LOW
GLUCOSE BLDC GLUCOMTR-MCNC: 162 MG/DL — HIGH (ref 70–99)
GLUCOSE BLDC GLUCOMTR-MCNC: 163 MG/DL — HIGH (ref 70–99)
GLUCOSE BLDC GLUCOMTR-MCNC: 211 MG/DL — HIGH (ref 70–99)
GLUCOSE BLDC GLUCOMTR-MCNC: 225 MG/DL — HIGH (ref 70–99)
GLUCOSE BLDC GLUCOMTR-MCNC: 291 MG/DL — HIGH (ref 70–99)
GLUCOSE SERPL-MCNC: 274 MG/DL — HIGH (ref 70–99)
HCT VFR BLD CALC: 39.6 % — SIGNIFICANT CHANGE UP (ref 39–50)
HGB BLD-MCNC: 12 G/DL — LOW (ref 13–17)
MAGNESIUM SERPL-MCNC: 2.4 MG/DL — SIGNIFICANT CHANGE UP (ref 1.6–2.6)
MCHC RBC-ENTMCNC: 21.6 PG — LOW (ref 27–34)
MCHC RBC-ENTMCNC: 30.3 GM/DL — LOW (ref 32–36)
MCV RBC AUTO: 71.4 FL — LOW (ref 80–100)
NRBC # BLD: 0 /100 WBCS — SIGNIFICANT CHANGE UP (ref 0–0)
PHOSPHATE SERPL-MCNC: 4.1 MG/DL — SIGNIFICANT CHANGE UP (ref 2.5–4.5)
PLATELET # BLD AUTO: 166 K/UL — SIGNIFICANT CHANGE UP (ref 150–400)
POTASSIUM SERPL-MCNC: 5 MMOL/L — SIGNIFICANT CHANGE UP (ref 3.5–5.3)
POTASSIUM SERPL-SCNC: 5 MMOL/L — SIGNIFICANT CHANGE UP (ref 3.5–5.3)
RBC # BLD: 5.55 M/UL — SIGNIFICANT CHANGE UP (ref 4.2–5.8)
RBC # FLD: 18.6 % — HIGH (ref 10.3–14.5)
SODIUM SERPL-SCNC: 132 MMOL/L — LOW (ref 135–145)
SPECIMEN SOURCE: SIGNIFICANT CHANGE UP
WBC # BLD: 5.33 K/UL — SIGNIFICANT CHANGE UP (ref 3.8–10.5)
WBC # FLD AUTO: 5.33 K/UL — SIGNIFICANT CHANGE UP (ref 3.8–10.5)

## 2022-12-13 PROCEDURE — 99233 SBSQ HOSP IP/OBS HIGH 50: CPT

## 2022-12-13 PROCEDURE — 93306 TTE W/DOPPLER COMPLETE: CPT | Mod: 26

## 2022-12-13 RX ORDER — SODIUM CHLORIDE 9 MG/ML
250 INJECTION INTRAMUSCULAR; INTRAVENOUS; SUBCUTANEOUS ONCE
Refills: 0 | Status: COMPLETED | OUTPATIENT
Start: 2022-12-13 | End: 2022-12-13

## 2022-12-13 RX ORDER — NICOTINE POLACRILEX 2 MG
4 GUM BUCCAL
Refills: 0 | Status: DISCONTINUED | OUTPATIENT
Start: 2022-12-13 | End: 2022-12-13

## 2022-12-13 RX ORDER — METOPROLOL TARTRATE 50 MG
50 TABLET ORAL DAILY
Refills: 0 | Status: DISCONTINUED | OUTPATIENT
Start: 2022-12-14 | End: 2022-12-15

## 2022-12-13 RX ORDER — ALPRAZOLAM 0.25 MG
0.25 TABLET ORAL
Refills: 0 | Status: DISCONTINUED | OUTPATIENT
Start: 2022-12-13 | End: 2022-12-15

## 2022-12-13 RX ORDER — SODIUM BICARBONATE 1 MEQ/ML
650 SYRINGE (ML) INTRAVENOUS THREE TIMES A DAY
Refills: 0 | Status: DISCONTINUED | OUTPATIENT
Start: 2022-12-13 | End: 2022-12-14

## 2022-12-13 RX ORDER — INSULIN LISPRO 100/ML
6 VIAL (ML) SUBCUTANEOUS
Refills: 0 | Status: DISCONTINUED | OUTPATIENT
Start: 2022-12-13 | End: 2022-12-15

## 2022-12-13 RX ORDER — NICOTINE POLACRILEX 2 MG
4 GUM BUCCAL
Refills: 0 | Status: DISCONTINUED | OUTPATIENT
Start: 2022-12-13 | End: 2022-12-15

## 2022-12-13 RX ADMIN — Medication 81 MILLIGRAM(S): at 11:12

## 2022-12-13 RX ADMIN — ATORVASTATIN CALCIUM 80 MILLIGRAM(S): 80 TABLET, FILM COATED ORAL at 21:40

## 2022-12-13 RX ADMIN — INSULIN GLARGINE 20 UNIT(S): 100 INJECTION, SOLUTION SUBCUTANEOUS at 02:48

## 2022-12-13 RX ADMIN — SODIUM CHLORIDE 100 MILLILITER(S): 9 INJECTION INTRAMUSCULAR; INTRAVENOUS; SUBCUTANEOUS at 02:33

## 2022-12-13 RX ADMIN — SODIUM CHLORIDE 250 MILLILITER(S): 9 INJECTION INTRAMUSCULAR; INTRAVENOUS; SUBCUTANEOUS at 02:34

## 2022-12-13 RX ADMIN — PANTOPRAZOLE SODIUM 40 MILLIGRAM(S): 20 TABLET, DELAYED RELEASE ORAL at 06:50

## 2022-12-13 RX ADMIN — Medication 0.25 MILLIGRAM(S): at 23:43

## 2022-12-13 RX ADMIN — HEPARIN SODIUM 5000 UNIT(S): 5000 INJECTION INTRAVENOUS; SUBCUTANEOUS at 06:50

## 2022-12-13 RX ADMIN — HEPARIN SODIUM 5000 UNIT(S): 5000 INJECTION INTRAVENOUS; SUBCUTANEOUS at 14:01

## 2022-12-13 RX ADMIN — Medication 1 PATCH: at 11:12

## 2022-12-13 RX ADMIN — HEPARIN SODIUM 5000 UNIT(S): 5000 INJECTION INTRAVENOUS; SUBCUTANEOUS at 21:40

## 2022-12-13 RX ADMIN — INSULIN GLARGINE 20 UNIT(S): 100 INJECTION, SOLUTION SUBCUTANEOUS at 21:39

## 2022-12-13 RX ADMIN — Medication 2: at 17:11

## 2022-12-13 RX ADMIN — Medication 3: at 11:57

## 2022-12-13 RX ADMIN — Medication 650 MILLIGRAM(S): at 21:41

## 2022-12-13 RX ADMIN — TAMSULOSIN HYDROCHLORIDE 0.4 MILLIGRAM(S): 0.4 CAPSULE ORAL at 21:40

## 2022-12-13 RX ADMIN — CLOPIDOGREL BISULFATE 75 MILLIGRAM(S): 75 TABLET, FILM COATED ORAL at 11:11

## 2022-12-13 NOTE — PROGRESS NOTE ADULT - PROBLEM SELECTOR PLAN 1
Suspect due to recent increase dosing in GDMT and diuresis, precipitated by diarrhea possible infectious etiology  - improving

## 2022-12-13 NOTE — PROGRESS NOTE ADULT - PROBLEM SELECTOR PLAN 2
Improving with IVF, still hypotensive   Holding all GDMT and diuresis as above   Diarrhea: f/u stool studies Improved with IVF, but still soft BPs. Recommend stopping IVF and encouraging PO intake and PO fluids.   Holding all GDMT and diuresis as above   Diarrhea: f/u stool studies

## 2022-12-13 NOTE — CONSULT NOTE ADULT - SUBJECTIVE AND OBJECTIVE BOX
[facility]  [phone]  NEPHROLOGY      INITIAL CONSULT NOTE  --------------------------------------------------------------------------------  HPI:    62 year old Male with PMHx HFrEF (last EF - 19%;10/2022) s/p AICD, CAD s/p Stents, T2DM, HTN, COPD, CKD p/w 4-day h/o generalized weakness and fatigue and diarrhea. Pt reports having multiple voluminous watery non-bloody stools(3-4x/day) for the past few days. He has had associated lightheadedness, dizziness and vision changes. Diarrhea had temporarily resolved after he took Imodium but started again a day later. He’s also had recent changes in his CHF medication regimen including up-titration of his entresto and decrease in his bumex and spironolactone. He had checked his BP at home which was in the sys 60s and he decided to come in for further evaluation. He states over the last month, his blood pressures have been low (70's) on numerous occassions. In ED, he was noted to be Hypotensive 63/48. Given 2L NS boluses, Zosyn, Xanax, Benadryl. He received an additional 250 cc bolus for hypotension. Now on maintenance fluids NS @ 100 cc/hr. Also noted to be in MANJIT. Renal consulted.     PAST HISTORY  --------------------------------------------------------------------------------  PAST MEDICAL & SURGICAL HISTORY:  Stented coronary artery      Diabetes      AICD (automatic cardioverter/defibrillator) present      Hypertension      Heart failure with reduced ejection fraction      History of ischemic cardiomyopathy      History of COPD      H/O gastroesophageal reflux (GERD)      H/O vasectomy  20 yrs ago (2000)        FAMILY HISTORY:  Family history of COPD (chronic obstructive pulmonary disease) (Sibling)    Family history of cardiac disorder  Paternal      PAST SOCIAL HISTORY:  former smoker    ALLERGIES & MEDICATIONS  --------------------------------------------------------------------------------  Allergies    Zosyn (Pruritus)    Intolerances      Standing Inpatient Medications  aspirin  chewable 81 milliGRAM(s) Oral daily  atorvastatin 80 milliGRAM(s) Oral at bedtime  clopidogrel Tablet 75 milliGRAM(s) Oral daily  dextrose 5%. 1000 milliLiter(s) IV Continuous <Continuous>  dextrose 50% Injectable 25 Gram(s) IV Push once  glucagon  Injectable 1 milliGRAM(s) IntraMuscular once  heparin   Injectable 5000 Unit(s) SubCutaneous every 8 hours  insulin glargine Injectable (LANTUS) 20 Unit(s) SubCutaneous at bedtime  insulin lispro (ADMELOG) corrective regimen sliding scale   SubCutaneous three times a day before meals  nicotine - 21 mG/24Hr(s) Patch 1 Patch Transdermal daily  pantoprazole    Tablet 40 milliGRAM(s) Oral before breakfast  sodium chloride 0.9%. 1000 milliLiter(s) IV Continuous <Continuous>  tamsulosin 0.4 milliGRAM(s) Oral at bedtime    PRN Inpatient Medications  acetaminophen     Tablet .. 650 milliGRAM(s) Oral every 6 hours PRN  albuterol    90 MICROgram(s) HFA Inhaler 1 Puff(s) Inhalation three times a day PRN  aluminum hydroxide/magnesium hydroxide/simethicone Suspension 30 milliLiter(s) Oral every 4 hours PRN  dextrose Oral Gel 15 Gram(s) Oral once PRN  diphenhydrAMINE 25 milliGRAM(s) Oral every 4 hours PRN  melatonin 3 milliGRAM(s) Oral at bedtime PRN  nicotine  Polacrilex Gum 4 milliGRAM(s) Oral every 2 hours PRN  ondansetron Injectable 4 milliGRAM(s) IV Push every 8 hours PRN      REVIEW OF SYSTEMS  --------------------------------------------------------------------------------  Gen: No fevers/chills   Head/Eyes/Ears/Mouth: No headache; Normal hearing;  No sinus pain/discomfort, sore throat  Respiratory: No dyspnea, cough,   CV: No chest pain, orthopnea  GI: +diarrhea, No abdominal pain, nausea, vomiting,   : No dysuria, decrease urination   MSK: No joint pain/swelling; no back pain  Neuro: +dizziness/lightheadedness, +weakness,   also with no edema     VITALS/PHYSICAL EXAM  --------------------------------------------------------------------------------  T(C): 36.8 (12-13-22 @ 06:35), Max: 36.8 (12-12-22 @ 23:31)  HR: 100 (12-13-22 @ 06:35) (81 - 105)  BP: 91/50 (12-13-22 @ 06:35) (80/61 - 156/136)  RR: 18 (12-13-22 @ 06:35) (13 - 20)  SpO2: 95% (12-13-22 @ 06:35) (88% - 100%)  Wt(kg): --  Height (cm): 182.9 (12-12-22 @ 14:00)  Weight (kg): 79.4 (12-12-22 @ 14:00)  BMI (kg/m2): 23.7 (12-12-22 @ 14:00)  BSA (m2): 2.01 (12-12-22 @ 14:00)      12-13-22 @ 07:01  -  12-13-22 @ 14:15  --------------------------------------------------------  IN: 240 mL / OUT: 1700 mL / NET: -1460 mL      Physical Exam:  	Gen: Non toxic comfortable appearing   	Pulm: decrease bs  no rales or ronchi or wheezing  	CV: +JVD. RRR, S1S2; no rub  	Back: no sacral edema  	Abd: +BS, soft, nontender/nondistended  	: No suprapubic tenderness  	UE: Warm, no cyanosis  no clubbing,  no edema;   	LE: Warm, no cyanosis  no clubbing, no edema  	Neuro: alert and oriented. speech coherent   	Skin: Warm, no decrease skin turgor, chronic venous stasis changes    LABS/STUDIES  --------------------------------------------------------------------------------              12.4   7.44  >-----------<  199      [12-12-22 @ 14:39]              41.0     130  |  100  |  137  ----------------------------<  148      [12-12-22 @ 19:24]  5.4   |  14  |  2.88        Ca     9.1     [12-12-22 @ 19:24]    TPro  8.3  /  Alb  4.4  /  TBili  0.3  /  DBili  x   /  AST  16  /  ALT  15  /  AlkPhos  120  [12-12-22 @ 19:24]    PT/INR: PT 12.8 , INR 1.11       [12-12-22 @ 14:39]  PTT: 31.1       [12-12-22 @ 14:39]      Creatinine Trend:  SCr 2.88 [12-12 @ 19:24]  SCr 3.22 [12-12 @ 14:39]    Urinalysis - [12-12-22 @ 16:28]      Color Light Yellow / Appearance Clear / SG 1.014 / pH 6.0      Gluc Trace / Ketone Negative  / Bili Negative / Urobili Negative       Blood Negative / Protein Trace / Leuk Est Negative / Nitrite Negative      RBC 2 / WBC 1 / Hyaline 11 / Gran  / Sq Epi  / Non Sq Epi 0 / Bacteria Negative      Iron 33, TIBC 390, %sat 9      [10-02-22 @ 07:03]  Ferritin 86      [10-02-22 @ 07:04]  PTH -- (Ca 8.9)      [10-02-22 @ 07:04]   73  HbA1c 8.9      [12-16-19 @ 08:15]  TSH 3.02      [09-30-22 @ 13:45]    HCV 0.09, Nonreact      [12-16-19 @ 08:36]

## 2022-12-13 NOTE — PROGRESS NOTE ADULT - SUBJECTIVE AND OBJECTIVE BOX
[facility]  Lul Cruz  Office: [phone]  Cell: [phone]  Can also be reached on Microsoft Teams     INTERVAL HPI/OVERNIGHT EVENTS: Admitted overnight, presented with hypovolemic shock, starting improving with isotonic fluids. Transferred overnight. Patient seen and examined this AM laying down on his stretcher. Feeling a little "woozy" otherwise generally well. No difficulties with breathing, no palpitations or chest pain. No fever or chills or cough. DIarrhea has since stopped 2 days ago after imodium. Started developing a rash and pruitis in the ED unclear if from Zosyn.       REVIEW OF SYSTEMS:  Negative except as per HPI    Vital Signs Last 24 Hrs  T(C): 36.8 (13 Dec 2022 06:35), Max: 36.8 (12 Dec 2022 23:31)  T(F): 98.2 (13 Dec 2022 06:35), Max: 98.2 (12 Dec 2022 23:31)  HR: 100 (13 Dec 2022 06:35) (74 - 105)  BP: 91/50 (13 Dec 2022 06:35) (63/48 - 156/136)  BP(mean): 79 (13 Dec 2022 02:56) (64 - 143)  RR: 18 (13 Dec 2022 06:35) (13 - 20)  SpO2: 95% (13 Dec 2022 06:35) (88% - 100%)    Parameters below as of 13 Dec 2022 06:35  Patient On (Oxygen Delivery Method): room air        PHYSICAL EXAMINATION:  GENERAL: NAD, well developed, AAOx4 to person place time situation  HEAD:  Atraumatic, Normocephalic  EYES:  conjunctiva and sclera clear  NECK: Supple, No JVD, Normal thyroid  CHEST/LUNG: diminished breath sounds, otherwise clear no wheezing or crackles  HEART: Regular rate and rhythm; No murmurs, rubs, or gallops  ABDOMEN: Soft, Nontender, Nondistended; Bowel sounds present  NERVOUS SYSTEM:  Alert & Oriented X3,  no focal neurological deficits  EXTREMITIES:  2+ Peripheral Pulses, No clubbing, cyanosis, or edema. left 5th digit ulcer, 3rd digit amputation lower ext.  SKIN: warm dry                          12.4   7.44  )-----------( 199      ( 12 Dec 2022 14:39 )             41.0     12-12    130<L>  |  100  |  137<H>  ----------------------------<  148<H>  5.4<H>   |  14<L>  |  2.88<H>    Ca    9.1      12 Dec 2022 19:24    TPro  8.3  /  Alb  4.4  /  TBili  0.3  /  DBili  x   /  AST  16  /  ALT  15  /  AlkPhos  120  12-12    LIVER FUNCTIONS - ( 12 Dec 2022 19:24 )  Alb: 4.4 g/dL / Pro: 8.3 g/dL / ALK PHOS: 120 U/L / ALT: 15 U/L / AST: 16 U/L / GGT: x               PT/INR - ( 12 Dec 2022 14:39 )   PT: 12.8 sec;   INR: 1.11 ratio         PTT - ( 12 Dec 2022 14:39 )  PTT:31.1 sec    CAPILLARY BLOOD GLUCOSE      RADIOLOGY & ADDITIONAL TESTS:                   [facility]  Lul Anthony  Office: [phone]  Cell: [phone]  Can also be reached on Microsoft Teams     INTERVAL HPI/OVERNIGHT EVENTS: Admitted overnight, presented with hypovolemic shock, starting improving with isotonic fluids. Transferred to [facility] overnight. Pattie seen and examined this AM laying down on his recliner. Feeling a little "woozy" otherwise generally well. No difficulties with breathing, no palpitations or chest pain. No fever or chills or cough. DIarrhea has since stopped 2 days ago after imodium. Started developing a rash and pruitis in the ED unclear if from Zosyn.       REVIEW OF SYSTEMS:  Negative except as per HPI    Vital Signs Last 24 Hrs  T(C): 36.8 (13 Dec 2022 06:35), Max: 36.8 (12 Dec 2022 23:31)  T(F): 98.2 (13 Dec 2022 06:35), Max: 98.2 (12 Dec 2022 23:31)  HR: 100 (13 Dec 2022 06:35) (74 - 105)  BP: 91/50 (13 Dec 2022 06:35) (63/48 - 156/136)  BP(mean): 79 (13 Dec 2022 02:56) (64 - 143)  RR: 18 (13 Dec 2022 06:35) (13 - 20)  SpO2: 95% (13 Dec 2022 06:35) (88% - 100%)    Parameters below as of 13 Dec 2022 06:35  Patient On (Oxygen Delivery Method): room air        PHYSICAL EXAMINATION:  GENERAL: NAD, well developed, AAOx4 to person place time situation  HEAD:  Atraumatic, Normocephalic  EYES:  conjunctiva and sclera clear  NECK: Supple, No JVD, Normal thyroid  CHEST/LUNG: diminished breath sounds, otherwise clear no wheezing or crackles  HEART: Regular rate and rhythm; No murmurs, rubs, or gallops  ABDOMEN: Soft, Nontender, Nondistended; Bowel sounds present  NERVOUS SYSTEM:  Alert & Oriented X3,  no focal neurological deficits  EXTREMITIES:  2+ Peripheral Pulses, No clubbing, cyanosis, or edema. left 5th digit ulcer, 3rd digit amputation lower ext.  SKIN: warm dry                          12.4   7.44  )-----------( 199      ( 12 Dec 2022 14:39 )             41.0     12-12    130<L>  |  100  |  137<H>  ----------------------------<  148<H>  5.4<H>   |  14<L>  |  2.88<H>    Ca    9.1      12 Dec 2022 19:24    TPro  8.3  /  Alb  4.4  /  TBili  0.3  /  DBili  x   /  AST  16  /  ALT  15  /  AlkPhos  120  12-12    LIVER FUNCTIONS - ( 12 Dec 2022 19:24 )  Alb: 4.4 g/dL / Pro: 8.3 g/dL / ALK PHOS: 120 U/L / ALT: 15 U/L / AST: 16 U/L / GGT: x               PT/INR - ( 12 Dec 2022 14:39 )   PT: 12.8 sec;   INR: 1.11 ratio         PTT - ( 12 Dec 2022 14:39 )  PTT:31.1 sec    CAPILLARY BLOOD GLUCOSE      RADIOLOGY & ADDITIONAL TESTS:

## 2022-12-13 NOTE — CONSULT NOTE ADULT - ASSESSMENT
62 year old Male with PMHx HFrEF (last EF - 19%;10/2022) s/p AICD, CAD s/p Stents, T2DM, HTN, COPD, CKD p/w 4-day h/o generalized weakness and fatigue and diarrhea. Pt reports having multiple voluminous watery non-bloody stools(3-4x/day) for the past few days. He has had associated lightheadedness, dizziness and vision changes. Diarrhea had temporarily resolved after he took Imodium but started again a day later. He’s also had recent changes in his CHF medication regimen including up-titration of his entresto and decrease in his bumex and spironolactone. He had checked his BP at home which was in the sys 60s and he decided to come in for further evaluation. He states over the last month, his blood pressures have been low (70's) on numerous occassions. In ED, he was noted to be Hypotensive 63/48. Given 2L NS boluses, Zosyn, Xanax, Benadryl. He received an additional 250 cc bolus for hypotension. Now on maintenance fluids NS @ 100 cc/hr. Also noted to be in MANJIT. Renal consulted.     1- MANJIT on CKD  2- hypotension  3- acidosis  4- hyperkalemia  5- diarrhea  6- CHF      MANJIT in setting of hypotension leading to ischemic ATN secondary to diuretics/entresto  creatinine is downtrending after IVF hydration  BUN remains very elevated.   severe metabolic acidosis, mentating well  to repeat ABG with lytes to check ph, and lactate  hyperkalemia, to check BMP stat to check K and bicarb level  low k diet  he has been adequately hydrated with over 2.5L ivf, and he is at risk for fluid overload given his low EF.   will stop IVF for now, hold entresto and diuretics   trend bp  non-oliguric,   strict I/O  bmp, abg with lytes, mag, phos ordered stat  d/w NP  d/w attending

## 2022-12-13 NOTE — PROGRESS NOTE ADULT - SUBJECTIVE AND OBJECTIVE BOX
Cardiology Consult      Interval History: Hypotensive SBP 60's in ED, s/p 3.25L IV hydration; now on  ml/hr. Blood pressure slightly improved. Patient reported that diarrhea stopped 2 days ago. No events overnight.       OBJECTIVE  Vitals:  T(C): 36.8 (22 @ 06:35), Max: 36.8 (22 @ 23:31)  HR: 100 (22 @ 06:35) (74 - 105)  BP: 91/50 (22 @ 06:35) (63/48 - 156/136)  RR: 18 (22 @ 06:35) (13 - 20)  SpO2: 95% (22 @ 06:35) (88% - 100%)  Wt(kg): --    I/O:  I&O's Summary    13 Dec 2022 07:01  -  13 Dec 2022 12:19  --------------------------------------------------------  IN: 240 mL / OUT: 800 mL / NET: -560 mL        PHYSICAL EXAM:  Appearance: NAD.   HEENT: No pallor noted.  No JVD  Cardiovascular: RRR with no murmurs.  Respiratory: Lungs CTAB.   Gastrointestinal:  Soft, nontender. 	  Extremities: No edema b/l.   Neurologic:  Alert and awake.   	  LABS:                        12.4   7.44  )-----------( 199      ( 12 Dec 2022 14:39 )             41.0     12    130<L>  |  100  |  137<H>  ----------------------------<  148<H>  5.4<H>   |  14<L>  |  2.88<H>    Ca    9.1      12 Dec 2022 19:24    TPro  8.3  /  Alb  4.4  /  TBili  0.3  /  DBili  x   /  AST  16  /  ALT  15  /  AlkPhos  120  12-12    PT/INR - ( 12 Dec 2022 14:39 )   PT: 12.8 sec;   INR: 1.11 ratio         PTT - ( 12 Dec 2022 14:39 )  PTT:31.1 sec  Urinalysis Basic - ( 12 Dec 2022 16:28 )    Color: Light Yellow / Appearance: Clear / S.014 / pH: x  Gluc: x / Ketone: Negative  / Bili: Negative / Urobili: Negative   Blood: x / Protein: Trace / Nitrite: Negative   Leuk Esterase: Negative / RBC: 2 /hpf / WBC 1 /HPF   Sq Epi: x / Non Sq Epi: 0 /hpf / Bacteria: Negative        RADIOLOGY & ADDITIONAL TESTS:  Reviewed .    MEDICATIONS  (STANDING):  aspirin  chewable 81 milliGRAM(s) Oral daily  atorvastatin 80 milliGRAM(s) Oral at bedtime  clopidogrel Tablet 75 milliGRAM(s) Oral daily  dextrose 5%. 1000 milliLiter(s) (100 mL/Hr) IV Continuous <Continuous>  dextrose 50% Injectable 25 Gram(s) IV Push once  glucagon  Injectable 1 milliGRAM(s) IntraMuscular once  heparin   Injectable 5000 Unit(s) SubCutaneous every 8 hours  insulin glargine Injectable (LANTUS) 20 Unit(s) SubCutaneous at bedtime  insulin lispro (ADMELOG) corrective regimen sliding scale   SubCutaneous three times a day before meals  nicotine - 21 mG/24Hr(s) Patch 1 Patch Transdermal daily  pantoprazole    Tablet 40 milliGRAM(s) Oral before breakfast  sodium chloride 0.9%. 1000 milliLiter(s) (100 mL/Hr) IV Continuous <Continuous>  tamsulosin 0.4 milliGRAM(s) Oral at bedtime    MEDICATIONS  (PRN):  acetaminophen     Tablet .. 650 milliGRAM(s) Oral every 6 hours PRN Temp greater or equal to 38C (100.4F), Mild Pain (1 - 3)  albuterol    90 MICROgram(s) HFA Inhaler 1 Puff(s) Inhalation three times a day PRN Shortness of Breath and/or Wheezing  aluminum hydroxide/magnesium hydroxide/simethicone Suspension 30 milliLiter(s) Oral every 4 hours PRN Dyspepsia  dextrose Oral Gel 15 Gram(s) Oral once PRN Blood Glucose LESS THAN 70 milliGRAM(s)/deciliter  diphenhydrAMINE 25 milliGRAM(s) Oral every 4 hours PRN Rash and/or Itching  melatonin 3 milliGRAM(s) Oral at bedtime PRN Insomnia  nicotine  Polacrilex Gum 4 milliGRAM(s) Oral every 2 hours PRN nicotine craving  ondansetron Injectable 4 milliGRAM(s) IV Push every 8 hours PRN Nausea and/or Vomiting

## 2022-12-13 NOTE — CONSULT NOTE ADULT - NS ATTEND AMEND GEN_ALL_CORE FT
seen at pt request.  known to us from prior admissions. has hx of cardiomyopathy admitted with severe hypotension after recent readjustment of his diuretics. complicated course by diarrhea. has had dizziness stated lost approx 30 lb recently.     lungs mild decrease bs no rales   heart RRR  ext no edema      MANJIT on ckd   acidosis  hypotension on admission     repeat cr   to have abg   hold further ivf unless hypotension   temporarily hold entresto and diuretics   sodium bicarbonate tab 650 mg tid x 2 days

## 2022-12-13 NOTE — PROGRESS NOTE ADULT - PROBLEM SELECTOR PLAN 1
Etiology: ICMP, LVEF 19%, LVIDd 6.5 cm  GDMT: Toprol Xl 75 mg daily, Entresto 49-51 mg BID, spironolactone 50 mg daily, and Jardiance 10 mg daily  Hold all GDMT for now while patient is being fluid resuscitated   Diuretics: holding Bumex to 4mg BID. Dry weight 243-244 lbs  Device: s/p S-ICD, pending outpatient cardiomems  Follows with Dr. Vanegas as an outpatient

## 2022-12-13 NOTE — PATIENT PROFILE ADULT - FALL HARM RISK - UNIVERSAL INTERVENTIONS
Bed in lowest position, wheels locked, appropriate side rails in place/Call bell, personal items and telephone in reach/Instruct patient to call for assistance before getting out of bed or chair/Non-slip footwear when patient is out of bed/[place] to call system/Physically safe environment - no spills, clutter or unnecessary equipment/Purposeful Proactive Rounding/Room/bathroom lighting operational, light cord in reach

## 2022-12-14 DIAGNOSIS — E87.20 ACIDOSIS, UNSPECIFIED: ICD-10-CM

## 2022-12-14 LAB
A1C WITH ESTIMATED AVERAGE GLUCOSE RESULT: 8.2 % — HIGH (ref 4–5.6)
ANION GAP SERPL CALC-SCNC: 12 MMOL/L — SIGNIFICANT CHANGE UP (ref 5–17)
BUN SERPL-MCNC: 87 MG/DL — HIGH (ref 7–23)
CALCIUM SERPL-MCNC: 9.6 MG/DL — SIGNIFICANT CHANGE UP (ref 8.4–10.5)
CHLORIDE SERPL-SCNC: 106 MMOL/L — SIGNIFICANT CHANGE UP (ref 96–108)
CO2 SERPL-SCNC: 14 MMOL/L — LOW (ref 22–31)
CREAT SERPL-MCNC: 1.84 MG/DL — HIGH (ref 0.5–1.3)
EGFR: 41 ML/MIN/1.73M2 — LOW
ESTIMATED AVERAGE GLUCOSE: 189 MG/DL — HIGH (ref 68–114)
GLUCOSE BLDC GLUCOMTR-MCNC: 180 MG/DL — HIGH (ref 70–99)
GLUCOSE BLDC GLUCOMTR-MCNC: 191 MG/DL — HIGH (ref 70–99)
GLUCOSE BLDC GLUCOMTR-MCNC: 236 MG/DL — HIGH (ref 70–99)
GLUCOSE BLDC GLUCOMTR-MCNC: 261 MG/DL — HIGH (ref 70–99)
GLUCOSE SERPL-MCNC: 192 MG/DL — HIGH (ref 70–99)
MAGNESIUM SERPL-MCNC: 2.4 MG/DL — SIGNIFICANT CHANGE UP (ref 1.6–2.6)
POTASSIUM SERPL-MCNC: 5 MMOL/L — SIGNIFICANT CHANGE UP (ref 3.5–5.3)
POTASSIUM SERPL-SCNC: 5 MMOL/L — SIGNIFICANT CHANGE UP (ref 3.5–5.3)
SODIUM SERPL-SCNC: 132 MMOL/L — LOW (ref 135–145)

## 2022-12-14 RX ORDER — BUMETANIDE 0.25 MG/ML
1 INJECTION INTRAMUSCULAR; INTRAVENOUS EVERY 12 HOURS
Refills: 0 | Status: DISCONTINUED | OUTPATIENT
Start: 2022-12-14 | End: 2022-12-15

## 2022-12-14 RX ORDER — SODIUM BICARBONATE 1 MEQ/ML
1300 SYRINGE (ML) INTRAVENOUS THREE TIMES A DAY
Refills: 0 | Status: DISCONTINUED | OUTPATIENT
Start: 2022-12-14 | End: 2022-12-15

## 2022-12-14 RX ORDER — INSULIN GLARGINE 100 [IU]/ML
30 INJECTION, SOLUTION SUBCUTANEOUS AT BEDTIME
Refills: 0 | Status: DISCONTINUED | OUTPATIENT
Start: 2022-12-14 | End: 2022-12-15

## 2022-12-14 RX ADMIN — Medication 1300 MILLIGRAM(S): at 09:36

## 2022-12-14 RX ADMIN — HEPARIN SODIUM 5000 UNIT(S): 5000 INJECTION INTRAVENOUS; SUBCUTANEOUS at 22:05

## 2022-12-14 RX ADMIN — Medication 1300 MILLIGRAM(S): at 22:05

## 2022-12-14 RX ADMIN — HEPARIN SODIUM 5000 UNIT(S): 5000 INJECTION INTRAVENOUS; SUBCUTANEOUS at 13:17

## 2022-12-14 RX ADMIN — Medication 1 PATCH: at 11:25

## 2022-12-14 RX ADMIN — Medication 1 PATCH: at 22:00

## 2022-12-14 RX ADMIN — Medication 6 UNIT(S): at 17:51

## 2022-12-14 RX ADMIN — HEPARIN SODIUM 5000 UNIT(S): 5000 INJECTION INTRAVENOUS; SUBCUTANEOUS at 05:22

## 2022-12-14 RX ADMIN — Medication 1: at 08:23

## 2022-12-14 RX ADMIN — CLOPIDOGREL BISULFATE 75 MILLIGRAM(S): 75 TABLET, FILM COATED ORAL at 11:11

## 2022-12-14 RX ADMIN — Medication 1 PATCH: at 11:11

## 2022-12-14 RX ADMIN — Medication 50 MILLIGRAM(S): at 05:22

## 2022-12-14 RX ADMIN — PANTOPRAZOLE SODIUM 40 MILLIGRAM(S): 20 TABLET, DELAYED RELEASE ORAL at 05:22

## 2022-12-14 RX ADMIN — TAMSULOSIN HYDROCHLORIDE 0.4 MILLIGRAM(S): 0.4 CAPSULE ORAL at 22:05

## 2022-12-14 RX ADMIN — Medication 6 UNIT(S): at 08:32

## 2022-12-14 RX ADMIN — ATORVASTATIN CALCIUM 80 MILLIGRAM(S): 80 TABLET, FILM COATED ORAL at 22:05

## 2022-12-14 RX ADMIN — Medication 6 UNIT(S): at 12:05

## 2022-12-14 RX ADMIN — Medication 1300 MILLIGRAM(S): at 13:17

## 2022-12-14 RX ADMIN — Medication 1: at 17:50

## 2022-12-14 RX ADMIN — Medication 3: at 12:04

## 2022-12-14 RX ADMIN — Medication 0.25 MILLIGRAM(S): at 23:58

## 2022-12-14 RX ADMIN — INSULIN GLARGINE 30 UNIT(S): 100 INJECTION, SOLUTION SUBCUTANEOUS at 22:04

## 2022-12-14 RX ADMIN — Medication 81 MILLIGRAM(S): at 11:11

## 2022-12-14 RX ADMIN — Medication 1 PATCH: at 07:19

## 2022-12-14 RX ADMIN — Medication 650 MILLIGRAM(S): at 05:22

## 2022-12-14 RX ADMIN — BUMETANIDE 1 MILLIGRAM(S): 0.25 INJECTION INTRAMUSCULAR; INTRAVENOUS at 18:49

## 2022-12-14 NOTE — PROVIDER CONTACT NOTE (OTHER) - ASSESSMENT
Patient sitting in recliner talking to his wife on the phone. Asymptomatic. VS T 98.2; HR 86; BP 97/65; RR 20; O2 98%

## 2022-12-14 NOTE — PROGRESS NOTE ADULT - SUBJECTIVE AND OBJECTIVE BOX
[facility]  Lul Cruz  Office: [phone]  Cell: [phone]  Can also be reached on [employer] Teams       INTERVAL HPI/OVERNIGHT EVENTS: No acute overnight events. Was watching tv got anxious and required anxiolytic, feeling well this AM, urinated a lot overnight. Offers no acute complaints.      REVIEW OF SYSTEMS:  CONSTITUTIONAL: No fever, weight loss, or fatigue  RESPIRATORY: No cough, wheezing, chills or hemoptysis; No shortness of breath  CARDIOVASCULAR: No chest pain, palpitations, dizziness, or leg swelling  GASTROINTESTINAL: No abdominal pain. No nausea, vomiting, or hematemesis; No diarrhea or constipation. No melena or hematochezia.  GENITOURINARY: No dysuria or hematuria, urinary frequency  NEUROLOGICAL: No headaches, memory loss, loss of strength, numbness, or tremors  SKIN: No itching, burning, rashes, or lesions     Vital Signs Last 24 Hrs  T(C): 36.5 (14 Dec 2022 04:21), Max: 36.6 (13 Dec 2022 21:34)  T(F): 97.7 (14 Dec 2022 04:21), Max: 97.8 (13 Dec 2022 21:34)  HR: 96 (14 Dec 2022 04:21) (95 - 98)  BP: 100/61 (14 Dec 2022 04:21) (94/60 - 100/61)  BP(mean): --  RR: 18 (14 Dec 2022 04:21) (18 - 18)  SpO2: 97% (14 Dec 2022 04:21) (96% - 97%)    Parameters below as of 14 Dec 2022 04:21  Patient On (Oxygen Delivery Method): room air        PHYSICAL EXAMINATION:  GENERAL: NAD, well developed, AAOx4 to person place time situation  HEAD:  Atraumatic, Normocephalic  EYES:  conjunctiva and sclera clear  NECK: Supple, No JVD, Normal thyroid  CHEST/LUNG: diminished breath sounds, otherwise clear no wheezing or crackles  HEART: Regular rate and rhythm; No murmurs, rubs, or gallops  ABDOMEN: Soft, Nontender, Nondistended; Bowel sounds present  NERVOUS SYSTEM:  Alert & Oriented X3,  no focal neurological deficits  EXTREMITIES:  2+ Peripheral Pulses, No clubbing, cyanosis, or edema. left 5th digit ulcer, 3rd digit amputation lower ext.  SKIN: warm dry, multiple tattoos                        12.0   5.33  )-----------( 166      ( 13 Dec 2022 15:49 )             39.6     12-14    132<L>  |  106  |  87<H>  ----------------------------<  192<H>  5.0   |  14<L>  |  1.84<H>    Ca    9.6      14 Dec 2022 06:32  Phos  4.1     12-13  Mg     2.4     12-14    TPro  8.3  /  Alb  4.4  /  TBili  0.3  /  DBili  x   /  AST  16  /  ALT  15  /  AlkPhos  120  12-12    LIVER FUNCTIONS - ( 12 Dec 2022 19:24 )  Alb: 4.4 g/dL / Pro: 8.3 g/dL / ALK PHOS: 120 U/L / ALT: 15 U/L / AST: 16 U/L / GGT: x               PT/INR - ( 12 Dec 2022 14:39 )   PT: 12.8 sec;   INR: 1.11 ratio         PTT - ( 12 Dec 2022 14:39 )  PTT:31.1 sec    CAPILLARY BLOOD GLUCOSE      RADIOLOGY & ADDITIONAL TESTS:

## 2022-12-14 NOTE — PROGRESS NOTE ADULT - SUBJECTIVE AND OBJECTIVE BOX
[facility]   [phone]  RENAL FOLLOW UP NOTE  --------------------------------------------------------------------------------  Chief Complaint:    24 hour events/subjective:    seen earlier   states feels better   no worsening sob     PAST HISTORY  --------------------------------------------------------------------------------  No significant changes to PMH, PSH, FHx, SHx, unless otherwise noted    ALLERGIES & MEDICATIONS  --------------------------------------------------------------------------------  Allergies    Zosyn (Pruritus)    Intolerances      Standing Inpatient Medications  aspirin  chewable 81 milliGRAM(s) Oral daily  atorvastatin 80 milliGRAM(s) Oral at bedtime  buMETAnide 1 milliGRAM(s) Oral every 12 hours  clopidogrel Tablet 75 milliGRAM(s) Oral daily  dextrose 5%. 1000 milliLiter(s) IV Continuous <Continuous>  dextrose 50% Injectable 25 Gram(s) IV Push once  glucagon  Injectable 1 milliGRAM(s) IntraMuscular once  heparin   Injectable 5000 Unit(s) SubCutaneous every 8 hours  insulin glargine Injectable (LANTUS) 30 Unit(s) SubCutaneous at bedtime  insulin lispro (ADMELOG) corrective regimen sliding scale   SubCutaneous three times a day before meals  insulin lispro Injectable (ADMELOG) 6 Unit(s) SubCutaneous three times a day before meals  metoprolol succinate ER 50 milliGRAM(s) Oral daily  nicotine - 21 mG/24Hr(s) Patch 1 Patch Transdermal daily  pantoprazole    Tablet 40 milliGRAM(s) Oral before breakfast  sodium bicarbonate 1300 milliGRAM(s) Oral three times a day  tamsulosin 0.4 milliGRAM(s) Oral at bedtime    PRN Inpatient Medications  acetaminophen     Tablet .. 650 milliGRAM(s) Oral every 6 hours PRN  albuterol    90 MICROgram(s) HFA Inhaler 1 Puff(s) Inhalation three times a day PRN  ALPRAZolam 0.25 milliGRAM(s) Oral two times a day PRN  aluminum hydroxide/magnesium hydroxide/simethicone Suspension 30 milliLiter(s) Oral every 4 hours PRN  dextrose Oral Gel 15 Gram(s) Oral once PRN  melatonin 3 milliGRAM(s) Oral at bedtime PRN  nicotine  Polacrilex Gum 4 milliGRAM(s) Oral every 2 hours PRN  ondansetron Injectable 4 milliGRAM(s) IV Push every 8 hours PRN      REVIEW OF SYSTEMS  --------------------------------------------------------------------------------    Gen: denies  fevers/chills,  CVS: denies chest pain/palpitations  Resp: denies SOB/Cough  GI: Denies N/V/Abd pain  : Denies dysuria/oliguria    VITALS/PHYSICAL EXAM  --------------------------------------------------------------------------------  T(C): 36.4 (12-14-22 @ 20:04), Max: 36.5 (12-14-22 @ 04:21)  HR: 100 (12-14-22 @ 20:04) (89 - 100)  BP: 104/67 (12-14-22 @ 20:04) (98/62 - 104/67)  RR: 20 (12-14-22 @ 20:04) (18 - 20)  SpO2: 99% (12-14-22 @ 20:04) (97% - 99%)  Wt(kg): --        12-13-22 @ 07:01  -  12-14-22 @ 07:00  --------------------------------------------------------  IN: 240 mL / OUT: 2400 mL / NET: -2160 mL    12-14-22 @ 07:01  -  12-14-22 @ 22:14  --------------------------------------------------------  IN: 1130 mL / OUT: 750 mL / NET: 380 mL      Physical Exam:  	    Gen: Non toxic comfortable appearing   	Pulm: decrease bs  no rales or ronchi or wheezing  	CV: + JVD. RRR, S1S2; no rub  	Abd: +BS, soft, nontender/nondistended  	UE: Warm, no cyanosis  no clubbing,  no edema;   	LE: Warm, no cyanosis  no clubbing, no edema  	Neuro: alert and oriented. speech coherent   	Skin: Warm, no decrease skin turgor, chronic venous stasis changes      LABS/STUDIES  --------------------------------------------------------------------------------              12.0   5.33  >-----------<  166      [12-13-22 @ 15:49]              39.6     132  |  106  |  87  ----------------------------<  192      [12-14-22 @ 06:32]  5.0   |  14  |  1.84        Ca     9.6     [12-14-22 @ 06:32]      Mg     2.4     [12-14-22 @ 06:32]      Phos  4.1     [12-13-22 @ 15:49]            Creatinine Trend:  SCr 1.84 [12-14 @ 06:32]  SCr 2.19 [12-13 @ 15:49]  SCr 2.88 [12-12 @ 19:24]  SCr 3.22 [12-12 @ 14:39]              Urinalysis - [12-12-22 @ 16:28]      Color Light Yellow / Appearance Clear / SG 1.014 / pH 6.0      Gluc Trace / Ketone Negative  / Bili Negative / Urobili Negative       Blood Negative / Protein Trace / Leuk Est Negative / Nitrite Negative      RBC 2 / WBC 1 / Hyaline 11 / Gran  / Sq Epi  / Non Sq Epi 0 / Bacteria Negative      Iron 33, TIBC 390, %sat 9      [10-02-22 @ 07:03]  Ferritin 86      [10-02-22 @ 07:04]  PTH -- (Ca 8.9)      [10-02-22 @ 07:04]   73  HbA1c 8.9      [12-16-19 @ 08:15]  TSH 3.02      [09-30-22 @ 13:45]

## 2022-12-14 NOTE — PROGRESS NOTE ADULT - PROBLEM SELECTOR PLAN 1
Given sodium bicarb last night, worsening acidosis  Appreciate Nephro recs, Dr. Cain  Increased NaHCO3 to 1300mg TID  Repeat BMP in Am

## 2022-12-15 ENCOUNTER — TRANSCRIPTION ENCOUNTER (OUTPATIENT)
Age: 62
End: 2022-12-15

## 2022-12-15 VITALS
SYSTOLIC BLOOD PRESSURE: 110 MMHG | HEART RATE: 98 BPM | TEMPERATURE: 98 F | OXYGEN SATURATION: 100 % | DIASTOLIC BLOOD PRESSURE: 75 MMHG

## 2022-12-15 LAB
ANION GAP SERPL CALC-SCNC: 14 MMOL/L — SIGNIFICANT CHANGE UP (ref 5–17)
BUN SERPL-MCNC: 76 MG/DL — HIGH (ref 7–23)
CALCIUM SERPL-MCNC: 9.6 MG/DL — SIGNIFICANT CHANGE UP (ref 8.4–10.5)
CHLORIDE SERPL-SCNC: 104 MMOL/L — SIGNIFICANT CHANGE UP (ref 96–108)
CO2 SERPL-SCNC: 18 MMOL/L — LOW (ref 22–31)
CREAT SERPL-MCNC: 1.84 MG/DL — HIGH (ref 0.5–1.3)
EGFR: 41 ML/MIN/1.73M2 — LOW
GLUCOSE BLDC GLUCOMTR-MCNC: 205 MG/DL — HIGH (ref 70–99)
GLUCOSE BLDC GLUCOMTR-MCNC: 256 MG/DL — HIGH (ref 70–99)
GLUCOSE SERPL-MCNC: 188 MG/DL — HIGH (ref 70–99)
MAGNESIUM SERPL-MCNC: 2.4 MG/DL — SIGNIFICANT CHANGE UP (ref 1.6–2.6)
PHOSPHATE SERPL-MCNC: 3.4 MG/DL — SIGNIFICANT CHANGE UP (ref 2.5–4.5)
POTASSIUM SERPL-MCNC: 4.2 MMOL/L — SIGNIFICANT CHANGE UP (ref 3.5–5.3)
POTASSIUM SERPL-SCNC: 4.2 MMOL/L — SIGNIFICANT CHANGE UP (ref 3.5–5.3)
SODIUM SERPL-SCNC: 136 MMOL/L — SIGNIFICANT CHANGE UP (ref 135–145)

## 2022-12-15 PROCEDURE — 99233 SBSQ HOSP IP/OBS HIGH 50: CPT

## 2022-12-15 RX ORDER — BUMETANIDE 0.25 MG/ML
1 INJECTION INTRAMUSCULAR; INTRAVENOUS
Qty: 60 | Refills: 0
Start: 2022-12-15 | End: 2023-01-13

## 2022-12-15 RX ORDER — SPIRONOLACTONE 25 MG/1
1 TABLET, FILM COATED ORAL
Qty: 0 | Refills: 0 | DISCHARGE

## 2022-12-15 RX ORDER — SODIUM BICARBONATE 1 MEQ/ML
2 SYRINGE (ML) INTRAVENOUS
Qty: 12 | Refills: 0
Start: 2022-12-15 | End: 2022-12-16

## 2022-12-15 RX ORDER — LOSARTAN POTASSIUM 100 MG/1
1 TABLET, FILM COATED ORAL
Qty: 30 | Refills: 0
Start: 2022-12-15 | End: 2023-01-13

## 2022-12-15 RX ORDER — BUMETANIDE 0.25 MG/ML
2 INJECTION INTRAMUSCULAR; INTRAVENOUS
Qty: 0 | Refills: 0 | DISCHARGE
Start: 2022-12-15 | End: 2023-01-13

## 2022-12-15 RX ORDER — SACUBITRIL AND VALSARTAN 24; 26 MG/1; MG/1
1 TABLET, FILM COATED ORAL
Qty: 0 | Refills: 0 | DISCHARGE

## 2022-12-15 RX ADMIN — Medication 1 PATCH: at 11:46

## 2022-12-15 RX ADMIN — Medication 1300 MILLIGRAM(S): at 11:48

## 2022-12-15 RX ADMIN — Medication 1 PATCH: at 11:48

## 2022-12-15 RX ADMIN — PANTOPRAZOLE SODIUM 40 MILLIGRAM(S): 20 TABLET, DELAYED RELEASE ORAL at 06:21

## 2022-12-15 RX ADMIN — Medication 6 UNIT(S): at 11:51

## 2022-12-15 RX ADMIN — Medication 1 PATCH: at 06:23

## 2022-12-15 RX ADMIN — Medication 2: at 08:08

## 2022-12-15 RX ADMIN — Medication 6 UNIT(S): at 08:09

## 2022-12-15 RX ADMIN — Medication 3: at 11:51

## 2022-12-15 RX ADMIN — Medication 81 MILLIGRAM(S): at 11:49

## 2022-12-15 RX ADMIN — Medication 50 MILLIGRAM(S): at 06:21

## 2022-12-15 RX ADMIN — HEPARIN SODIUM 5000 UNIT(S): 5000 INJECTION INTRAVENOUS; SUBCUTANEOUS at 06:21

## 2022-12-15 RX ADMIN — BUMETANIDE 1 MILLIGRAM(S): 0.25 INJECTION INTRAMUSCULAR; INTRAVENOUS at 06:21

## 2022-12-15 RX ADMIN — Medication 1300 MILLIGRAM(S): at 06:18

## 2022-12-15 RX ADMIN — CLOPIDOGREL BISULFATE 75 MILLIGRAM(S): 75 TABLET, FILM COATED ORAL at 11:49

## 2022-12-15 NOTE — PROGRESS NOTE ADULT - ASSESSMENT
62 year old Male with PMHx HFrEF (last EF - 19%;10/2022) s/p AICD, CAD s/p Stents, T2DM, HTN, COPD, CKD p/w 4-day h/o generalized weakness and fatigue and diarrhea. Pt reports having multiple voluminous watery non-bloody stools(3-4x/day) for the past few days. He has had associated lightheadedness, dizziness and vision changes. Diarrhea had temporarily resolved after he took Imodium but started again a day later. He’s also had recent changes in his CHF medication regimen including up-titration of his entresto and decrease in his bumex and spironolactone. He had checked his BP at home which was in the sys 60s and he decided to come in for further evaluation. He states over the last month, his blood pressures have been low (70's) on numerous occassions. In ED, he was noted to be Hypotensive 63/48. Given 2L NS boluses, Zosyn, Xanax, Benadryl. He received an additional 250 cc bolus for hypotension. Now on maintenance fluids NS @ 100 cc/hr. Also noted to be in MANJIT. Renal consulted.     1- MANJIT on CKD  2- hypotension resolved   3- acidosis  4- hyperkalemia resolved   5- diarrhea  6- CHF      MANJIT in setting of hypotension leading to ischemic ATN secondary to diuretics/entresto  creatinine is downtrending after IVF hydration  severe metabolic acidosis, mentating well sodium bicarbonate 1300 mg tid   restart bumex   trend bp  non-oliguric,   strict I/O  
62M PMH HFrEF(last EF-19%;10/2022) s/p AICD, CAD s/p Stents, T2DM, HTN, COPD p/w 4-day h/o generalized weakness, fatigue and diarrhea.
63 y/o male with h/o chronic systolic HF ACC/AHA stage C-D, ICMP LVEF (19%,  LVIDd 5.6cm),  CAD s/p anterior wall STEMI s/p PCI to large RI in 2018 and PCI to LCx in 2019, single chamber ICD s/p extraction due to bacteremia followed by s/p S-ICD implant in 2020, recent tobacco use (quit 9/2022), COPD, LUIS ALFREDO not on CPAP, DM 2 (a1c 7%), L toe osteomyelitis s/p amputation 1/2022, CKD 3 and h/o right mod-sev hydronephrosis who presented  with GI illness (diarrhea 3-4x a day, nonbloody, watery) since Thursday last week, with outpatient GDMT and diuretics. Overall impression, in light of lower BNP, clinical presentation consistent with hypovolemia form diarrhea, clear CXR, improvement in pre-renal MANJIT with fluids. Now resolved.       Review of studies:   Echo:   10/3/22 TTE: LVIDd 6.5, LVEF 19%, severe global LVS dysfunction, intermediate diastolic function, mld MR, mild AR, mi.d TR, Dilated IVC (2.6cm), RA pressure 15mmHg, RVSP 56mmHg  TTE 2019: LVEF 10-15% LVIDd 7cm, mild MR, RVSP 26mmHg (RAP 8mmHg)  Cardiac Cath/PCI: C 2019 LM normal, LAD ostial 100%, LCx 80%, RI normal, RCA mid 50%. S/p NERI to LCx.   
62 year old Male with PMHx HFrEF (last EF - 19%;10/2022) s/p AICD, CAD s/p Stents, T2DM, HTN, COPD, CKD p/w 4-day h/o generalized weakness and fatigue and diarrhea. Pt reports having multiple voluminous watery non-bloody stools(3-4x/day) for the past few days. He has had associated lightheadedness, dizziness and vision changes. Diarrhea had temporarily resolved after he took Imodium but started again a day later. He’s also had recent changes in his CHF medication regimen including up-titration of his entresto and decrease in his bumex and spironolactone. He had checked his BP at home which was in the sys 60s and he decided to come in for further evaluation. He states over the last month, his blood pressures have been low (70's) on numerous occassions. In ED, he was noted to be Hypotensive 63/48. Given 2L NS boluses, Zosyn, Xanax, Benadryl. He received an additional 250 cc bolus for hypotension. Now on maintenance fluids NS @ 100 cc/hr. Also noted to be in MANJIT. Renal consulted.     1- MANJIT on CKD  2- hypotension resolved   3- acidosis  4- hyperkalemia resolved   5- diarrhea  6- CHF      MANJIT in setting of hypotension leading to ischemic ATN secondary to diuretics/entresto  creatinine is downtrending after IVF hydration  severe metabolic acidosis, mentating well sodium bicarbonate 1300 mg tid x 6 doses  bicarb improving  bumex 1mg BID  trend bp  non-oliguric,   strict I/O  trend creatinine and electrolytes daily
62M PMH HFrEF(last EF-19%;10/2022) s/p AICD, CAD s/p Stents, T2DM, HTN, COPD p/w 4-day h/o generalized weakness, fatigue and diarrhea.
63 y/o male with h/o chronic systolic HF ACC/AHA stage C-D, ICMP LVEF (19%,  LVIDd 5.6cm),  CAD s/p anterior wall STEMI s/p PCI to large RI in 2018 and PCI to LCx in 2019, single chamber ICD s/p extraction due to bacteremia followed by s/p S-ICD implant in 2020, recent tobacco use (quit 9/2022), COPD, LUIS ALFREDO not on CPAP, DM 2 (a1c 7%), L toe osteomyelitis s/p amputation 1/2022, CKD 3 and h/o right mod-sev hydronephrosis who presented  with GI illness (diarrhea 3-4x a day, nonbloody, watery) since Thursday last week, with outpatient GDMT and diuretics.    Overall impression, in light of lower BNP, clinical presentation consistent with hypovolemia form diarrhea, clear CXR, improvement in pre-renal MANJIT with fluids.   Review of studies:   Echo:   10/3/22 TTE: LVIDd 6.5, LVEF 19%, severe global LVS dysfunction, intermediate diastolic function, mld MR, mild AR, mi.d TR, Dilated IVC (2.6cm), RA pressure 15mmHg, RVSP 56mmHg  TTE 2019: LVEF 10-15% LVIDd 7cm, mild MR, RVSP 26mmHg (RAP 8mmHg)  Cardiac Cath/PCI: C 2019 LM normal, LAD ostial 100%, LCx 80%, RI normal, RCA mid 50%. S/p NERI to LCx.       
62M PMH HFrEF(last EF-19%;10/2022) s/p AICD, CAD s/p Stents, T2DM, HTN, COPD p/w 4-day h/o generalized weakness, fatigue and diarrhea.

## 2022-12-15 NOTE — DISCHARGE NOTE PROVIDER - NSDCACTIVITY_GEN_ALL_CORE
Bathing allowed/Showering allowed/Stairs allowed/Driving allowed/Walking - Indoors allowed/No heavy lifting/straining/Walking - Outdoors allowed

## 2022-12-15 NOTE — DISCHARGE NOTE PROVIDER - HOSPITAL COURSE
62M PMH HFrEF(last EF-19%;10/2022) s/p AICD, CAD s/p Stents, T2DM, HTN, COPD p/w 4-day h/o generalized weakness and fatigue and diarrhea. Pt reports having multiple voluminous watery non-bloody stools(3-4x/day) for the past few days. He has had associated lightheadedness, dizziness and vision changes. Diarrhea had temporarily resolved after he took Imodium but started again a day later. Of note, pts daughter had flu-like illness about 4-weeks ago. He’s also had recent changes in his CHF medication regimen including uptitration of his entresto and dec in his bumex and spironolactone. He’s had dec fluid intake eating only ice-chips. He had checked his BP at home which was in the sys 60s and he decided to come in for further evaluation. His last hospitalization was here back in October. Denied possible food association, recent abx use, recent travel, fevers, abdominal pain   ED course: Afebrile. Hypotensive 63/48à102/64. Hypoxic 88% placed on 2LNC->93%->6LNC->NRB, improved to nl sat on RA. Given 2LNS boluses, Zosyn, Xanax, Benadryl. CCU consulted for hypovolemic shock. Pt had also complained of pruritus that started after receiving zosyn.    Patient was admitted to telemetry. He received approximately 3L of isotonic fluids. Blood pressure started improving, renal function improved. Patient started eating and drinking by himself with stabilization in blood pressure and renal function. Was resumed on metoprolol and bumex with good tolerance. Repeat echocardiogram showed improved ejection fraction. Seen by nephro, had persistent acidosis, started on sodium bicarb. Patient was discontinued on all his other GDMT. WIll resume them gradually in the outpatient setting. Patient to return to PCP office 2 dayts after discharge to obtain blood work and vital sign monitoring at home.     Patient is stable for discharge and is advised to follow up with PCP as outpatient  Please refer to patient's complete medical chart with documents for a full hospital course, for this is only a brief summary.

## 2022-12-15 NOTE — PROGRESS NOTE ADULT - PROBLEM SELECTOR PLAN 5
On lantus  Holding Jardiance
On lantus  Holding Jardiance
Repeat Echo with slightly improved EF  - Tolerating metoprolol and bumex well.  - Hold entresto/spironolactone  - Started on Sodium Bicarb, HCO3 better today  - Will establish dry weight as 240-244lbs  Discharge today on metoprolol and bumex. Resume farxiga at home.   Start losartan upon discharge.  Will follow up in office in 2 days to review blood pressure and fingersticks at home. WIll repeat labs at that time.
Repeat Echo with slightly improved EF  - Restarted metoprolol this AM, bumex to be resumed at night  - Hold entresto/spironolactone  - Started on Sodium Bicarb  - Will establish dry weight as 240-244lbs
-compensated  - heart failure following  -hold Hold all antihypertensives including GDMT and diuresis  - On IV fluids  -daily wts, monitor I/Os  -TTE pending  -tele monitoring

## 2022-12-15 NOTE — DISCHARGE NOTE NURSING/CASE MANAGEMENT/SOCIAL WORK - NSDCPEFALRISK_GEN_ALL_CORE
For information on Fall & Injury Prevention, visit: [email or web address] OR  [email or web address] OR  https://www.cdc.gov/steadi/patient.html

## 2022-12-15 NOTE — PROGRESS NOTE ADULT - SUBJECTIVE AND OBJECTIVE BOX
[facility]   [phone]  RENAL FOLLOW UP NOTE  --------------------------------------------------------------------------------  Chief Complaint:    24 hour events/subjective:    patient seen and examined  denies sob  no diarrhea    PAST HISTORY  --------------------------------------------------------------------------------  No significant changes to PMH, PSH, FHx, SHx, unless otherwise noted    ALLERGIES & MEDICATIONS  --------------------------------------------------------------------------------  Allergies    Zosyn (Pruritus)    Intolerances      Standing Inpatient Medications  aspirin  chewable 81 milliGRAM(s) Oral daily  atorvastatin 80 milliGRAM(s) Oral at bedtime  buMETAnide 1 milliGRAM(s) Oral every 12 hours  clopidogrel Tablet 75 milliGRAM(s) Oral daily  dextrose 5%. 1000 milliLiter(s) IV Continuous <Continuous>  dextrose 50% Injectable 25 Gram(s) IV Push once  glucagon  Injectable 1 milliGRAM(s) IntraMuscular once  heparin   Injectable 5000 Unit(s) SubCutaneous every 8 hours  insulin glargine Injectable (LANTUS) 30 Unit(s) SubCutaneous at bedtime  insulin lispro (ADMELOG) corrective regimen sliding scale   SubCutaneous three times a day before meals  insulin lispro Injectable (ADMELOG) 6 Unit(s) SubCutaneous three times a day before meals  metoprolol succinate ER 50 milliGRAM(s) Oral daily  nicotine - 21 mG/24Hr(s) Patch 1 Patch Transdermal daily  pantoprazole    Tablet 40 milliGRAM(s) Oral before breakfast  sodium bicarbonate 1300 milliGRAM(s) Oral three times a day  tamsulosin 0.4 milliGRAM(s) Oral at bedtime    PRN Inpatient Medications  acetaminophen     Tablet .. 650 milliGRAM(s) Oral every 6 hours PRN  albuterol    90 MICROgram(s) HFA Inhaler 1 Puff(s) Inhalation three times a day PRN  ALPRAZolam 0.25 milliGRAM(s) Oral two times a day PRN  aluminum hydroxide/magnesium hydroxide/simethicone Suspension 30 milliLiter(s) Oral every 4 hours PRN  dextrose Oral Gel 15 Gram(s) Oral once PRN  melatonin 3 milliGRAM(s) Oral at bedtime PRN  nicotine  Polacrilex Gum 4 milliGRAM(s) Oral every 2 hours PRN  ondansetron Injectable 4 milliGRAM(s) IV Push every 8 hours PRN      REVIEW OF SYSTEMS  --------------------------------------------------------------------------------    Gen: denies fevers/chills,  CVS: denies chest pain/palpitations  Resp: denies SOB/Cough  GI: Denies N/V/Abd pain  : Denies dysuria    VITALS/PHYSICAL EXAM  --------------------------------------------------------------------------------  T(C): 36.4 (12-15-22 @ 11:58), Max: 36.8 (12-14-22 @ 22:30)  HR: 98 (12-15-22 @ 11:58) (86 - 100)  BP: 110/75 (12-15-22 @ 11:58) (97/65 - 110/75)  RR: 19 (12-15-22 @ 04:44) (19 - 20)  SpO2: 100% (12-15-22 @ 11:58) (97% - 100%)  Wt(kg): --        12-14-22 @ 07:01  -  12-15-22 @ 07:00  --------------------------------------------------------  IN: 1130 mL / OUT: 2250 mL / NET: -1120 mL    12-15-22 @ 07:01  -  12-15-22 @ 13:54  --------------------------------------------------------  IN: 880 mL / OUT: 0 mL / NET: 880 mL      Physical Exam:  	              Gen: Non toxic comfortable appearing   	Pulm: decrease bs  no rales or ronchi or wheezing  	CV: + JVD. RRR, S1S2; no rub  	Abd: +BS, soft, nontender/nondistended  	UE: Warm, no cyanosis  no clubbing,  no edema;   	LE: Warm, no cyanosis  no clubbing, no edema  	Neuro: alert and oriented. speech coherent   	Skin: Warm, no decrease skin turgor, chronic venous stasis changes    LABS/STUDIES  --------------------------------------------------------------------------------              12.0   5.33  >-----------<  166      [12-13-22 @ 15:49]              39.6     136  |  104  |  76  ----------------------------<  188      [12-15-22 @ 05:00]  4.2   |  18  |  1.84        Ca     9.6     [12-15-22 @ 05:00]      Mg     2.4     [12-15-22 @ 05:00]      Phos  3.4     [12-15-22 @ 05:00]            Creatinine Trend:  SCr 1.84 [12-15 @ 05:00]  SCr 1.84 [12-14 @ 06:32]  SCr 2.19 [12-13 @ 15:49]  SCr 2.88 [12-12 @ 19:24]  SCr 3.22 [12-12 @ 14:39]              Urinalysis - [12-12-22 @ 16:28]      Color Light Yellow / Appearance Clear / SG 1.014 / pH 6.0      Gluc Trace / Ketone Negative  / Bili Negative / Urobili Negative       Blood Negative / Protein Trace / Leuk Est Negative / Nitrite Negative      RBC 2 / WBC 1 / Hyaline 11 / Gran  / Sq Epi  / Non Sq Epi 0 / Bacteria Negative      Iron 33, TIBC 390, %sat 9      [10-02-22 @ 07:03]  Ferritin 86      [10-02-22 @ 07:04]  PTH -- (Ca 8.9)      [10-02-22 @ 07:04]   73  HbA1c 8.9      [12-16-19 @ 08:15]  TSH 3.02      [09-30-22 @ 13:45]

## 2022-12-15 NOTE — PROGRESS NOTE ADULT - PROBLEM SELECTOR PLAN 4
s/p anterior wall STEMI s/p PCI to large RI in 2018 PCI to LCx on 2019  -c/ wDAPT and atrovastatin 80 mg daily
Improving  IVF was discontinued, patient eating and drinking well  Continue to trend BMP
s/p anterior wall STEMI s/p PCI to large RI in 2018 PCI to LCx on 2019  -c/ wDAPT and atrovastatin 80 mg daily.
Improving  IVF was discontinued, patient eating and drinking well  Continue to trend BMP
Prerenal/hypovolemia  -c/w gentle IV hydration , encourage PO intake  -will hold antihypertensives entresto, spironolactone, bumex and toprol   -monitor daily Cr  -avoid nephrotoxins and renally dose meds  - Am BMP pending, renal function appears to be improving. Baseline creatinine ~ 1.5

## 2022-12-15 NOTE — PROGRESS NOTE ADULT - SUBJECTIVE AND OBJECTIVE BOX
Cardiology Consult      Interval History: patient was seen and examined today. Reports feeling better. Pending discharge       OBJECTIVE  Vitals:  T(C): 36.4 (12-15-22 @ 11:58), Max: 36.8 (12-14-22 @ 22:30)  HR: 98 (12-15-22 @ 11:58) (86 - 100)  BP: 110/75 (12-15-22 @ 11:58) (97/65 - 110/75)  RR: 19 (12-15-22 @ 04:44) (19 - 20)  SpO2: 100% (12-15-22 @ 11:58) (97% - 100%)  Wt(kg): --    I/O:  I&O's Summary    14 Dec 2022 07:01  -  15 Dec 2022 07:00  --------------------------------------------------------  IN: 1130 mL / OUT: 2250 mL / NET: -1120 mL    15 Dec 2022 07:01  -  15 Dec 2022 12:16  --------------------------------------------------------  IN: 440 mL / OUT: 0 mL / NET: 440 mL        PHYSICAL EXAM:  Appearance: NAD.   HEENT: No pallor noted.  No JVD  Cardiovascular: RRR with no murmurs.  Respiratory: Lungs CTAB.   Gastrointestinal:  Soft, nontender. 	  Extremities: No edema b/l.   Neurologic:  Alert and awake.   LABS:                        12.0   5.33  )-----------( 166      ( 13 Dec 2022 15:49 )             39.6     12-15    136  |  104  |  76<H>  ----------------------------<  188<H>  4.2   |  18<L>  |  1.84<H>    Ca    9.6      15 Dec 2022 05:00  Phos  3.4     12-15  Mg     2.4     12-15            RADIOLOGY & ADDITIONAL TESTS:  Reviewed .    MEDICATIONS  (STANDING):  aspirin  chewable 81 milliGRAM(s) Oral daily  atorvastatin 80 milliGRAM(s) Oral at bedtime  buMETAnide 1 milliGRAM(s) Oral every 12 hours  clopidogrel Tablet 75 milliGRAM(s) Oral daily  dextrose 5%. 1000 milliLiter(s) (100 mL/Hr) IV Continuous <Continuous>  dextrose 50% Injectable 25 Gram(s) IV Push once  glucagon  Injectable 1 milliGRAM(s) IntraMuscular once  heparin   Injectable 5000 Unit(s) SubCutaneous every 8 hours  insulin glargine Injectable (LANTUS) 30 Unit(s) SubCutaneous at bedtime  insulin lispro (ADMELOG) corrective regimen sliding scale   SubCutaneous three times a day before meals  insulin lispro Injectable (ADMELOG) 6 Unit(s) SubCutaneous three times a day before meals  metoprolol succinate ER 50 milliGRAM(s) Oral daily  nicotine - 21 mG/24Hr(s) Patch 1 Patch Transdermal daily  pantoprazole    Tablet 40 milliGRAM(s) Oral before breakfast  sodium bicarbonate 1300 milliGRAM(s) Oral three times a day  tamsulosin 0.4 milliGRAM(s) Oral at bedtime    MEDICATIONS  (PRN):  acetaminophen     Tablet .. 650 milliGRAM(s) Oral every 6 hours PRN Temp greater or equal to 38C (100.4F), Mild Pain (1 - 3)  albuterol    90 MICROgram(s) HFA Inhaler 1 Puff(s) Inhalation three times a day PRN Shortness of Breath and/or Wheezing  ALPRAZolam 0.25 milliGRAM(s) Oral two times a day PRN anxiety  aluminum hydroxide/magnesium hydroxide/simethicone Suspension 30 milliLiter(s) Oral every 4 hours PRN Dyspepsia  dextrose Oral Gel 15 Gram(s) Oral once PRN Blood Glucose LESS THAN 70 milliGRAM(s)/deciliter  melatonin 3 milliGRAM(s) Oral at bedtime PRN Insomnia  nicotine  Polacrilex Gum 4 milliGRAM(s) Oral every 2 hours PRN nicotine craving  ondansetron Injectable 4 milliGRAM(s) IV Push every 8 hours PRN Nausea and/or Vomiting

## 2022-12-15 NOTE — PROGRESS NOTE ADULT - PROBLEM SELECTOR PROBLEM 4
CAD S/P percutaneous coronary angioplasty
CAD S/P percutaneous coronary angioplasty
MANJIT (acute kidney injury)

## 2022-12-15 NOTE — PROGRESS NOTE ADULT - PROBLEM SELECTOR PLAN 3
MANJIT on CKD (Stage 3) baseline Cr around 1.5-1.6  avoid nephrotoxins   fluid resuscitations  urine studies
MANJIT on CKD (Stage 3) baseline Cr around 1.5-1.6  Improved   avoid nephrotoxins   appreciate renal recs
Improving, prerenal AMNJIT improving  Originally all GDMT held  - Metoprolol restarted this AM at decreased dose  - Start Bumex tonight 1mg  - continue to hold spironolactone, Entresto
Was in hypovolemic shock due to medications and further precipitated by episodes of diarrhea  -c/w gentle hydration- BP gradually improving  - Hold all antihypertensives while still borderline hypotension  -entresto, toprol, spironolactone and bumex
Improving, prerenal MANJIT improving  Originally all GDMT held  - Metoprolol restarted this AM at decreased dose  - Start Bumex tonight 1mg  - continue to hold spironolactone, Entresto

## 2022-12-15 NOTE — PROGRESS NOTE ADULT - PROBLEM SELECTOR PLAN 6
A1c worse control than prior. COntinue home insulin regimen, 40 units basal and 10 units prandial.  Keep fingerstick log at home.  Return to office in 2 days to review blood glucose monitoring at home  Resume SGLT2 upon discharge
Holding oral hypoglycemics  Will consider starting SGLT-2  Increased lantus at bed time to 30 units  added prandial insulin 6 units  Continue to monitor  - Sliding scale achs
-hold oral hypoglycemics  -on lantus 40mg at home  -will start on lantus 20 qhs  - sliding scale ACHS

## 2022-12-15 NOTE — DISCHARGE NOTE NURSING/CASE MANAGEMENT/SOCIAL WORK - PATIENT PORTAL LINK FT
You can access the FollowMyHealth Patient Portal offered by  by registering at the following website: [email or web address]. By joining [facility]’s FollowMyHealth portal, you will also be able to view your health information using other applications (apps) compatible with our system.

## 2022-12-15 NOTE — PROGRESS NOTE ADULT - PROBLEM SELECTOR PLAN 7
-antiHTN being held in s/o hypotension  -continue to monitor and resume meds gradually if persistently hypertensive
-antiHTN being held in s/o hypotension  -continue to monitor and resume meds gradually if persistently hypertensive
BP improving, Holding some GDMT. Refer to above

## 2022-12-15 NOTE — PROGRESS NOTE ADULT - SUBJECTIVE AND OBJECTIVE BOX
[facility]  Lul Cruz  Office: [phone]  Cell: [phone]  Can also be reached on [employer] Teams     INTERVAL HPI/OVERNIGHT EVENTS: Doing well this AM, no complaints. Had beats of vtach overnight not sustained. No more diarrhea, no chest pain or palpitations no SOB. no lower ext edema      REVIEW OF SYSTEMS:  Negative except [per HPI    Vital Signs Last 24 Hrs  T(C): 36.4 (15 Dec 2022 11:58), Max: 36.8 (14 Dec 2022 22:30)  T(F): 97.6 (15 Dec 2022 11:58), Max: 98.2 (14 Dec 2022 22:30)  HR: 98 (15 Dec 2022 11:58) (86 - 100)  BP: 110/75 (15 Dec 2022 11:58) (97/65 - 110/75)  BP(mean): --  RR: 19 (15 Dec 2022 04:44) (19 - 20)  SpO2: 100% (15 Dec 2022 11:58) (97% - 100%)    Parameters below as of 15 Dec 2022 04:44  Patient On (Oxygen Delivery Method): room air        PHYSICAL EXAMINATION:  GENERAL: NAD, well developed, AAOx4 to person place time situation  HEAD:  Atraumatic, Normocephalic  EYES:  conjunctiva and sclera clear  NECK: Supple, No JVD, Normal thyroid  CHEST/LUNG: diminished breath sounds, otherwise clear no wheezing or crackles  HEART: Regular rate and rhythm; No murmurs, rubs, or gallops  ABDOMEN: Soft, Nontender, Nondistended; Bowel sounds present  NERVOUS SYSTEM:  Alert & Oriented X3,  no focal neurological deficits  EXTREMITIES:  2+ Peripheral Pulses, No clubbing, cyanosis, or edema. left 5th digit ulcer, 3rd digit amputation lower ext.  SKIN: warm dry, multiple tattoos                          12.0   5.33  )-----------( 166      ( 13 Dec 2022 15:49 )             39.6     12-15    136  |  104  |  76<H>  ----------------------------<  188<H>  4.2   |  18<L>  |  1.84<H>    Ca    9.6      15 Dec 2022 05:00  Phos  3.4     12-15  Mg     2.4     12-15                CAPILLARY BLOOD GLUCOSE      RADIOLOGY & ADDITIONAL TESTS:

## 2022-12-15 NOTE — DISCHARGE NOTE PROVIDER - NSDCCPCAREPLAN_GEN_ALL_CORE_FT
PRINCIPAL DISCHARGE DIAGNOSIS  Diagnosis: Acute renal injury due to hypovolemia  Assessment and Plan of Treatment: Presented with low blood pressures and kidney injury. Started on IV fluids with improvement in Blood pressure and kidney function. Heart failure meds(Entresto, Spironolactone, Farxiga) were held. Metoprolol and Bumex was started in the hospital. Please continue with metoprolol 75mg ER on discharge and Bumex 1mg 2x a day. Follow up in the primary care office in 2 days to review blood pressure monitoring and to repeat blood work.      SECONDARY DISCHARGE DIAGNOSES  Diagnosis: Metabolic acidosis  Assessment and Plan of Treatment: Noted to be acidotic, likely due to kidney injury. Seen be Nephrology, Dr. Cain. Please start sodium bicarbonate 1300mg 3x a day for 1 more day and return to PCP office in 2 days to repeat blood work.    Diagnosis: Chronic systolic congestive heart failure  Assessment and Plan of Treatment: Echocardiogram performed, ejection fraction slightly improved to ~30%. Continue metoprolol 75mg ER and Bumex 1mg 2x a day. Return to primary care office in 2 days to resume other heart failure medications. Stop Entresto, Spironolactone, Farxiga for now.    Diagnosis: T2DM (type 2 diabetes mellitus)  Assessment and Plan of Treatment: Continue with home insulin regimen at home.

## 2022-12-15 NOTE — DISCHARGE NOTE PROVIDER - NSDCMRMEDTOKEN_GEN_ALL_CORE_FT
albuterol 90 mcg/inh inhalation aerosol: 1 puff(s) inhaled 3 times a day, As needed, Shortness of Breath and/or Wheezing  ALPRAZolam 0.25 mg oral tablet: 1 tab(s) orally , As Needed  aspirin 81 mg oral tablet, chewable: 1 tab(s) orally once a day  bumetanide 1 mg oral tablet: 1 tab(s) orally every 12 hours  bumetanide 2 mg oral tablet: 1 tab(s) orally 2 times a day  clopidogrel 75 mg oral tablet: 1 tab(s) orally once a day  insulin glargine 100 units/mL subcutaneous solution: 40 unit(s) subcutaneous once a day (at bedtime)  insulin lispro 100 units/mL subcutaneous solution: 10 unit(s) subcutaneous 3 times a day (sliding scale)   Jardiance 10 mg oral tablet: 1 tab(s) orally once a day   metoprolol succinate 25 mg oral tablet, extended release: 3 tab(s) orally once a day  nicotine 21 mg/24 hr transdermal film, extended release: 1 patch transdermal once a day  pantoprazole 40 mg oral delayed release tablet: 1 tab(s) orally once a day (before a meal)  rosuvastatin 20 mg oral tablet: 1 tab(s) orally once a day  sacubitril-valsartan 49 mg-51 mg oral tablet: 1 tab(s) orally 2 times a day  sodium bicarbonate 650 mg oral tablet: 2 tab(s) orally 3 times a day  spironolactone 50 mg oral tablet: 1 tab(s) orally 2 times a day  tamsulosin 0.4 mg oral capsule: 1 cap(s) orally once a day   albuterol 90 mcg/inh inhalation aerosol: 1 puff(s) inhaled 3 times a day, As needed, Shortness of Breath and/or Wheezing  ALPRAZolam 0.25 mg oral tablet: 1 tab(s) orally , As Needed  aspirin 81 mg oral tablet, chewable: 1 tab(s) orally once a day  bumetanide 1 mg oral tablet: 1 tab(s) orally every 12 hours  clopidogrel 75 mg oral tablet: 1 tab(s) orally once a day  insulin glargine 100 units/mL subcutaneous solution: 40 unit(s) subcutaneous once a day (at bedtime)  insulin lispro 100 units/mL subcutaneous solution: 10 unit(s) subcutaneous 3 times a day (sliding scale)   Jardiance 10 mg oral tablet: 1 tab(s) orally once a day   losartan 25 mg oral tablet: 1 tab(s) orally once a day   metoprolol succinate 25 mg oral tablet, extended release: 3 tab(s) orally once a day  nicotine 21 mg/24 hr transdermal film, extended release: 1 patch transdermal once a day  pantoprazole 40 mg oral delayed release tablet: 1 tab(s) orally once a day (before a meal)  rosuvastatin 20 mg oral tablet: 1 tab(s) orally once a day  sodium bicarbonate 650 mg oral tablet: 2 tab(s) orally 3 times a day  tamsulosin 0.4 mg oral capsule: 1 cap(s) orally once a day

## 2022-12-15 NOTE — PROGRESS NOTE ADULT - PROBLEM SELECTOR PROBLEM 5
T2DM (type 2 diabetes mellitus)
T2DM (type 2 diabetes mellitus)
Chronic systolic congestive heart failure

## 2022-12-15 NOTE — PROGRESS NOTE ADULT - REASON FOR ADMISSION
Generalized weakness/fatigue

## 2022-12-15 NOTE — PROGRESS NOTE ADULT - PROBLEM SELECTOR PLAN 1
Etiology: ICMP, LVEF 19%, LVIDd 6.5 cm  GDMT: Toprol Xl 75 mg daily, Entresto 49-51 mg BID, spironolactone 50 mg daily, and Jardiance 10 mg daily  Discharge on: Losartan 25 mg daily (plan to reinitiate Entresto as an outpatient); c/w Toprol Xl 50 mg daily; for diuretics ok with Bumex 1 mg q12h   Diuretics: bumex as above, Dry weight 243-244 lbs  Device: s/p S-ICD, pending outpatient cardiomems  Follows with Dr. Vanegas as an outpatient.  Patient should follow up with Dr. Vanegas as an outpatient Etiology: ICMP, LVEF 19%, LVIDd 6.5 cm  GDMT: Toprol Xl 75 mg daily, Entresto 49-51 mg BID, spironolactone 50 mg daily, and Jardiance 10 mg daily  Discharge on: Losartan 25 mg daily (plan to reinitiate Entresto as an outpatient); c/w Toprol Xl 50 mg daily; for diuretics ok with Bumex 1 mg q12h   Diuretics: bumex as above, Dry weight 243-244 lbs  Device: s/p S-ICD, pending outpatient cardiomems  Follows with Dr. Vanegas as an outpatient.  Patient should follow up with Dr. Vanegas as an outpatient, he has an appointment on 1/4/23 at 9:30 am

## 2022-12-15 NOTE — DISCHARGE NOTE NURSING/CASE MANAGEMENT/SOCIAL WORK - NSDCVIVACCINE_GEN_ALL_CORE_FT
influenza, injectable, quadrivalent, preservative free; 08-Feb-2018 11:10; Chely Patterson (RN); [employer]; [id number]; IntraMuscular; Deltoid Left.; 0.5 milliLiter(s); VIS (VIS Published: 07-Aug-2015, VIS Presented: 08-Feb-2018);   influenza, injectable, quadrivalent, preservative free; 21-Sep-2020 18:51; Yan Cazares (RN); [employer]; [id number] (Exp. Date: 30-Jun-2021); IntraMuscular; Deltoid Right.; 0.5 milliLiter(s); VIS (VIS Published: 15-Aug-2019, VIS Presented: 21-Sep-2020);

## 2022-12-15 NOTE — DISCHARGE NOTE PROVIDER - CARE PROVIDER_API CALL
Lul Cruz (DO)  Medicine  [address] [place] [address], [address] [place] [address]  Phone: [phone]  Fax: [phone]  Follow Up Time:     Billy Camara)  Cardiovascular Disease; Internal Medicine; Interventional Cardiology  [address] [place] [id number]  Phone: [phone]  Fax: [phone]  Follow Up Time:

## 2022-12-15 NOTE — PROGRESS NOTE ADULT - PROBLEM SELECTOR PLAN 9
-possible allergic reaction to zosyn  -will treat with benadryl PO prn
-possible allergic reaction to zosyn  -will treat with benadryl PO prn  - no further episodes
-possible allergic reaction to zosyn  -will treat with benadryl PO prn  - no further episodes

## 2022-12-15 NOTE — PROGRESS NOTE ADULT - PROVIDER SPECIALTY LIST ADULT
Heart Failure
Internal Medicine
Nephrology
Nephrology
Heart Failure
Internal Medicine
Internal Medicine

## 2022-12-15 NOTE — PROGRESS NOTE ADULT - ATTENDING COMMENTS
63 y/o male with h/o chronic systolic HF ACC/AHA stage C-D, ICMP LVEF 10-15% LVIDd 7cm, CAD s/p anterior wall STEMI ‘PCI to large RI ’18 PCI to LCx ‘19, single chamber ICD s/p extraction due to bacteremia followed by s/p S-ICD implant ‘20, HTN, recent tobacco use (quit 9/2022), COPD, LUIS ALFREDO not on CPAP, DM 2 (a1c 7%), L toe osteomyelitis s/p amputation 1/2022, CKD 3 and h/o right mod-sev hydronephrosis who comes to the hospital after days of profuse watery diarrhea. In the ED he was noted to be hypotensive to the 60s and he was also noted to have an MANJIT to the mid 3s (baseline 1.5-1.9). He was givne gentle fluid resuscitation with improvement in this BP and creatinine. His GDMT has been held  Will slowly introduce his GDMT over the next couple of days.  He was scheduled to undergo a RHC and cardiomems placement, will discuss with patient if he would like to undergo this procedure while he is here .
63 y/o male with h/o chronic systolic HF ACC/AHA stage C-D, ICMP LVEF 10-15% LVIDd 7cm, CAD s/p anterior wall STEMI ‘PCI to large RI ’18 PCI to LCx ‘19, single chamber ICD s/p extraction due to bacteremia followed by s/p S-ICD implant ‘20, HTN, recent tobacco use (quit 9/2022), COPD, LUIS ALFREDO not on CPAP, DM 2 (a1c 7%), L toe osteomyelitis s/p amputation 1/2022, CKD 3 and h/o right mod-sev hydronephrosis who comes to the hospital after days of profuse watery diarrhea. In the ED he was noted to be hypotensive to the 60s and he was also noted to have an MANJIT to the mid 3s (baseline 1.5-1.9). He was given gentle fluid resuscitation with improvement in this BP and creatinine.  His creatinine has improved but not at baseline. He looks warm and well perfused on exam without edema  He is planned for discharged today. Can send out on Toprol 50mg XL and losartan 25mg daily. Will send out on bumex 1mg BID  Will set up for close outpatient followup

## 2022-12-15 NOTE — PROGRESS NOTE ADULT - PROBLEM SELECTOR PROBLEM 1
Chronic systolic congestive heart failure
Chronic systolic congestive heart failure
Metabolic acidosis
Generalized weakness
Metabolic acidosis

## 2022-12-19 ENCOUNTER — APPOINTMENT (OUTPATIENT)
Dept: ELECTROPHYSIOLOGY | Facility: CLINIC | Age: 62
End: 2022-12-19

## 2022-12-22 ENCOUNTER — FORM ENCOUNTER (OUTPATIENT)
Age: 62
End: 2022-12-22

## 2022-12-28 NOTE — REASON FOR VISIT
[Cardiac Failure] : cardiac failure [Spouse] : spouse Tremfya Counseling: I discussed with the patient the risks of guselkumab including but not limited to immunosuppression, serious infections, and drug reactions.  The patient understands that monitoring is required including a PPD at baseline and must alert us or the primary physician if symptoms of infection or other concerning signs are noted.

## 2023-01-03 RX ORDER — SPIRONOLACTONE 50 MG/1
50 TABLET ORAL
Qty: 60 | Refills: 2 | Status: DISCONTINUED | COMMUNITY
Start: 2022-10-11 | End: 2023-01-03

## 2023-01-03 RX ORDER — ROSUVASTATIN CALCIUM 20 MG/1
20 TABLET, FILM COATED ORAL DAILY
Refills: 0 | Status: ACTIVE | COMMUNITY
Start: 2023-01-03

## 2023-01-03 RX ORDER — SACUBITRIL AND VALSARTAN 49; 51 MG/1; MG/1
49-51 TABLET, FILM COATED ORAL TWICE DAILY
Qty: 60 | Refills: 5 | Status: DISCONTINUED | COMMUNITY
End: 2023-01-03

## 2023-01-03 RX ORDER — LINEZOLID 600 MG/1
600 TABLET, FILM COATED ORAL
Qty: 18 | Refills: 0 | Status: DISCONTINUED | COMMUNITY
Start: 2022-08-31 | End: 2023-01-03

## 2023-01-03 RX ORDER — METOLAZONE 2.5 MG/1
2.5 TABLET ORAL
Qty: 15 | Refills: 5 | Status: DISCONTINUED | COMMUNITY
Start: 2022-11-23 | End: 2023-01-03

## 2023-01-03 RX ORDER — ALPRAZOLAM 0.25 MG/1
0.25 TABLET ORAL
Refills: 0 | Status: ACTIVE | COMMUNITY
Start: 2022-09-22

## 2023-01-03 RX ORDER — ATORVASTATIN CALCIUM 80 MG/1
80 TABLET, FILM COATED ORAL
Refills: 0 | Status: DISCONTINUED | COMMUNITY
Start: 2018-02-08 | End: 2023-01-03

## 2023-01-03 RX ORDER — FEXOFENADINE HYDROCHLORIDE 180 MG/1
180 TABLET, FILM COATED ORAL DAILY
Refills: 0 | Status: DISCONTINUED | COMMUNITY
Start: 2022-10-11 | End: 2023-01-03

## 2023-01-03 RX ORDER — POTASSIUM CHLORIDE 1500 MG/1
20 TABLET, FILM COATED, EXTENDED RELEASE ORAL TWICE DAILY
Qty: 120 | Refills: 2 | Status: DISCONTINUED | COMMUNITY
Start: 2022-11-25 | End: 2023-01-03

## 2023-01-03 RX ORDER — BUMETANIDE 1 MG/1
1 TABLET ORAL TWICE DAILY
Qty: 30 | Refills: 3 | Status: ACTIVE | COMMUNITY
Start: 2022-10-11

## 2023-01-03 RX ORDER — AMMONIUM LACTATE 12 %
12 CREAM (GRAM) TOPICAL TWICE DAILY
Qty: 280 | Refills: 4 | Status: DISCONTINUED | COMMUNITY
Start: 2022-02-28 | End: 2023-01-03

## 2023-01-04 ENCOUNTER — APPOINTMENT (OUTPATIENT)
Dept: HEART FAILURE | Facility: CLINIC | Age: 63
End: 2023-01-04

## 2023-01-04 PROCEDURE — 85025 COMPLETE CBC W/AUTO DIFF WBC: CPT

## 2023-01-04 PROCEDURE — 85014 HEMATOCRIT: CPT

## 2023-01-04 PROCEDURE — 0225U NFCT DS DNA&RNA 21 SARSCOV2: CPT

## 2023-01-04 PROCEDURE — 80048 BASIC METABOLIC PNL TOTAL CA: CPT

## 2023-01-04 PROCEDURE — 84295 ASSAY OF SERUM SODIUM: CPT

## 2023-01-04 PROCEDURE — 82330 ASSAY OF CALCIUM: CPT

## 2023-01-04 PROCEDURE — 82803 BLOOD GASES ANY COMBINATION: CPT

## 2023-01-04 PROCEDURE — 84484 ASSAY OF TROPONIN QUANT: CPT

## 2023-01-04 PROCEDURE — 85018 HEMOGLOBIN: CPT

## 2023-01-04 PROCEDURE — 80053 COMPREHEN METABOLIC PANEL: CPT

## 2023-01-04 PROCEDURE — 87086 URINE CULTURE/COLONY COUNT: CPT

## 2023-01-04 PROCEDURE — 84132 ASSAY OF SERUM POTASSIUM: CPT

## 2023-01-04 PROCEDURE — 85730 THROMBOPLASTIN TIME PARTIAL: CPT

## 2023-01-04 PROCEDURE — 99291 CRITICAL CARE FIRST HOUR: CPT

## 2023-01-04 PROCEDURE — 83605 ASSAY OF LACTIC ACID: CPT

## 2023-01-04 PROCEDURE — C8929: CPT

## 2023-01-04 PROCEDURE — 71045 X-RAY EXAM CHEST 1 VIEW: CPT

## 2023-01-04 PROCEDURE — 96374 THER/PROPH/DIAG INJ IV PUSH: CPT

## 2023-01-04 PROCEDURE — 82435 ASSAY OF BLOOD CHLORIDE: CPT

## 2023-01-04 PROCEDURE — 82962 GLUCOSE BLOOD TEST: CPT

## 2023-01-04 PROCEDURE — 85610 PROTHROMBIN TIME: CPT

## 2023-01-04 PROCEDURE — 83735 ASSAY OF MAGNESIUM: CPT

## 2023-01-04 PROCEDURE — 85027 COMPLETE CBC AUTOMATED: CPT

## 2023-01-04 PROCEDURE — 82947 ASSAY GLUCOSE BLOOD QUANT: CPT

## 2023-01-04 PROCEDURE — 84100 ASSAY OF PHOSPHORUS: CPT

## 2023-01-04 PROCEDURE — 87040 BLOOD CULTURE FOR BACTERIA: CPT

## 2023-01-04 PROCEDURE — 83880 ASSAY OF NATRIURETIC PEPTIDE: CPT

## 2023-01-04 PROCEDURE — 83036 HEMOGLOBIN GLYCOSYLATED A1C: CPT

## 2023-01-04 PROCEDURE — 81001 URINALYSIS AUTO W/SCOPE: CPT

## 2023-01-04 NOTE — ASSESSMENT
[FreeTextEntry1] : # Chronic systolic HF ACC/AHA stage C-D, NYHA class\par Etiology: ICMP, LVEF 10-15%, LVIDd 7cm\par GDMT: Currently on Toprol Xl 75 mg daily, Losartan 25 mg qd and jardiance 10mg qd; Will obtain repeat labs today and if renal fxn permits will resume Entresto at 24-26 mg BID. Will plan to resu me MRA at subsequent visits.\par Diuretics: Currently on Bumex 1mg BID until at target weight 243-244 lbs\par Device: s/p S-ICD\par Labs: 12/15 Na 136, Na 4.2, BUN/Cr 76/1.8; Will repeat today\par HF education provided including lifestyle changes (low Na diet, fluid restriction, increase physical activity), current clinical condition, natural progression and prognosis. \par Will repeat TTE once euvolemic and optimized on HF GDMT outpatient\par Referred to Cardiac Rehab on [facility]\par CardioMEMs discussed and they remain interested. Will optimize volume status and schedule implant at next available with Dr. Smith.\par \par #CKD 3\par diurese as above\par \par #CAD\par s/p anterior wall STEMI ‘PCI to large RI ’18 PCI to LCx ‘19\par On DAPT and high intensity statin\par \par RTC in 3 weeks with Dr. Vanegas

## 2023-01-04 NOTE — DISCUSSION/SUMMARY
[FreeTextEntry1] : 63 y/o male with h/o chronic systolic HF ACC/AHA stage C-D, ICMP LVEF 10-15% LVIDd 7cm, CAD s/p anterior wall STEMI ‘PCI to large RI ’18 PCI to LCx ‘19, single chamber ICD s/p extraction due to bacteremia followed by s/p S-ICD implant ‘20`, HTN, recent tobacco use (quit 9/2022), COPD, LUIS ALFREDO not on CPAP, DM 2 (a1c 7%), L toe osteomyelitis s/p amputation 1/2022, CKD 3 and h/o right mod-sev hydronephrosis who presents today for follow-up of his HF. He is clinically hypervolemic, warm extremities. Im not sure compliance with diuretics is a 100%. We explained importance of medication/diet adherence. Will add metolazone and follow up in 3 weeks. He is a great candidate for cardioMEMs. He states that his PCP recommended him against it, unclear why. \par Plan as above.

## 2023-01-04 NOTE — CARDIOLOGY SUMMARY
[de-identified] : \par 10/3/22 TTE: LVIDd 6.5, LVEF 19%, sev global LVSdysfunction, intermediate diastolic function, nml BiAtria, mld MR, mild AR, mi.d TR, Dilated IVC (2.6cm), RA pressure 15mmHg, RVSP 56mmHg\par \par TTE 2019: LVEF 10-15% LVIDd 7cm, mild MR, RVSP 26mmHg (RAP 8mmHg)\par  [de-identified] : \par [facility] 2019 LM normal, LAD ostial 100%, LCx 80%, RI normal, RCA mid 50%. S/p NERI to LCx. \par

## 2023-01-04 NOTE — HISTORY OF PRESENT ILLNESS
[FreeTextEntry1] : Mr. Jessica is a 61 y/o male with h/o chronic systolic HF ACC/AHA stage C-D, ICMP LVEF 10-15% LVIDd 7cm, CAD s/p anterior wall STEMI ‘PCI to large RI ’18 PCI to LCx ‘19, single chamber ICD s/p extraction due to bacteremia followed by s/p S-ICD implant ‘20`, HTN, recent tobacco use (quit 9/2022), COPD, LUIS ALFREDO not on CPAP, DM 2 (a1c 7%), L toe osteomyelitis s/p amputation 1/2022, CKD 3 and h/o right mod-sev hydronephrosis who presents today for follow-up of his HF after recent hospital discharge.\par \par He has had has long hx of ischemic cardiomyopathy starting in 2004 s/p MI. Was hospitalized in [place] in this year x 4weeks in setting of covid-19 infection, cellulitis, septic shock, ADHF and MANJIT requiring temporary RRT. Both him and his wife reports most of his medications had to be discontinued due to low BP and he was temporarily on dialysis due to worsening kidney function.\par \par He presented to [facility] on 9/30/22 with several weeks of lower extremity edema, orthopnea, and abdominal distension admitted for ADHF. He was diuresed approximately 24 lbs on a Bumex gtt. Discharged on 10/7 at a weight of 248 lbs. \par \par Recently admitted to [facility] from 12/12-15  presented with diarrhea x2 days and hypotensive in ED with an MANJIT Cr 3' s,clinical presentation consistent with hypovolemia form diarrhea. Renal function improved with IVF repletion and he was discharged on GDMT. Cr at discharge 1.8 and weight 241lbs. \par \par \par

## 2023-01-04 NOTE — PHYSICAL EXAM
[Well Nourished] : well nourished [No Acute Distress] : no acute distress [No Xanthelasma] : no xanthelasma [Normal S1, S2] : normal S1, S2 [No Gallop] : no gallop [Clear Lung Fields] : clear lung fields [Good Air Entry] : good air entry [Soft] : abdomen soft [Normal Gait] : normal gait [No Rash] : no rash [Moves all extremities] : moves all extremities [Alert and Oriented] : alert and oriented [de-identified] : JVP ~12 cm of H20 [de-identified] : warm peripherally  [de-identified] : +1 b/l LE edema No

## 2023-01-09 ENCOUNTER — APPOINTMENT (OUTPATIENT)
Dept: CARDIOLOGY | Facility: CLINIC | Age: 63
End: 2023-01-09
Payer: MEDICARE

## 2023-01-09 ENCOUNTER — APPOINTMENT (OUTPATIENT)
Dept: HEART FAILURE | Facility: CLINIC | Age: 63
End: 2023-01-09

## 2023-01-09 PROCEDURE — 93798 PHYS/QHP OP CAR RHAB W/ECG: CPT

## 2023-01-09 NOTE — PHYSICAL EXAM
[Well Nourished] : well nourished [No Acute Distress] : no acute distress [No Xanthelasma] : no xanthelasma [Normal S1, S2] : normal S1, S2 [No Gallop] : no gallop [Clear Lung Fields] : clear lung fields [Good Air Entry] : good air entry [Soft] : abdomen soft [Normal Gait] : normal gait [No Rash] : no rash [Moves all extremities] : moves all extremities [Alert and Oriented] : alert and oriented [de-identified] : JVP ~12 cm of H20 [de-identified] : warm peripherally  [de-identified] : +1 b/l LE edema

## 2023-01-09 NOTE — REASON FOR VISIT
[Cardiac Failure] : cardiac failure [FreeTextEntry1] : Cards: Dr. Billy Camara\par EP: Dr. Rodríguez

## 2023-01-09 NOTE — HISTORY OF PRESENT ILLNESS
[FreeTextEntry1] : Mr. Jessica is a 63 y/o male with h/o chronic systolic HF ACC/AHA stage C-D, ICMP LVEF 10-15% LVIDd 7cm, CAD s/p anterior wall STEMI ‘PCI to large RI ’18 PCI to LCx ‘19, single chamber ICD s/p extraction due to bacteremia followed by s/p S-ICD implant ‘20`, HTN, recent tobacco use (quit 9/2022), COPD, LUIS ALFREDO not on CPAP, DM 2 (a1c 7%), L toe osteomyelitis s/p amputation 1/2022, CKD 3 and h/o right mod-sev hydronephrosis who presents today for follow-up of his HF.\par \par He has had has long hx of ischemic cardiomyopathy starting in 2004 s/p MI. Was hospitalized in [place] in this year x 4weeks in setting of covid-19 infection, cellulitis, septic shock, ADHF and MANJIT requiring temporary RRT. Both him and his wife reports most of his medications had to be discontinued due to low BP and he was temporarily on dialysis due to worsening kidney function.\par \par He presented to [facility] on 9/30/22 with several weeks of lower extremity edema, orthopnea, and abdominal distension admitted for ADHF. He was diuresed approximately 24 lbs on a Bumex gtt. Discharged on 10/7 at a weight of 248 lbs. \par \par When last seen in October, found to be volume overloaded and instructed to start Metolazone 2.5 mg twice a week. He noted robust response to one dose with subsequent 9 lb weight loss and was instructed to defer further doses. More recently one month ago, was instructed to reduce Bumex to 2 mg BID (from 4 mg BID) due to ongoing weight loss to 238 lbs in the setting of diarrhea. \par \par \par Exam:\par \par \par Plan:\par labs today, may be able to stop KCL given reduced diuretic requirement\par if euvolemic, will inc Toprol XL to 100 mg QD (from 75 mg QD)\par if there is BP room, will trial escalating PM dose of Entresto\par follow-up Guilherme in 2 months as scheduled \par

## 2023-01-09 NOTE — CARDIOLOGY SUMMARY
[de-identified] : \par 10/3/22 TTE: LVIDd 6.5, LVEF 19%, sev global LVSdysfunction, intermediate diastolic function, nml BiAtria, mld MR, mild AR, mi.d TR, Dilated IVC (2.6cm), RA pressure 15mmHg, RVSP 56mmHg\par \par TTE 2019: LVEF 10-15% LVIDd 7cm, mild MR, RVSP 26mmHg (RAP 8mmHg)\par  [de-identified] : \par [facility] 2019 LM normal, LAD ostial 100%, LCx 80%, RI normal, RCA mid 50%. S/p NERI to LCx. \par

## 2023-01-09 NOTE — ASSESSMENT
[FreeTextEntry1] : # Chronic systolic HF ACC/AHA stage C-D, NYHA class\par Etiology: ICMP, LVEF 10-15%, LVIDd 7cm\par GDMT: Toprol Xl 75 mg daily, Entresto 49-51 mg BID, spironolactone 50 mg daily, and Jardiance 10 mg daily\par Diuretics: Continue Bumex to 4mg BID until at target weight 243-244 lbs; Start metolazone 2.5mg twice a week\par Obtain labs in a 7-10 days \par Device: s/p S-ICD\par Labs: \par HF education provided including lifestyle changes (low Na diet, fluid restriction, increase physical activity), current clinical condition, natural progression and prognosis. \par Will repeat TTE once euvolemic and optimized on HF GDMT outpatient\par Referred to Cardiac Rehab on [facility]\par CardioMEMs discussed and they remain interested. Will optimize volume status and schedule implant at next available with Dr. Smith.\par \par #CKD 3\par diurese as above\par \par #CAD\par s/p anterior wall STEMI ‘PCI to large RI ’18 PCI to LCx ‘19\par On DAPT and high intensity statin\par \par RTC in 3 weeks with Dr. Vanegas

## 2023-01-11 ENCOUNTER — APPOINTMENT (OUTPATIENT)
Dept: CARDIOLOGY | Facility: CLINIC | Age: 63
End: 2023-01-11

## 2023-01-12 ENCOUNTER — APPOINTMENT (OUTPATIENT)
Dept: CARDIOLOGY | Facility: CLINIC | Age: 63
End: 2023-01-12

## 2023-01-13 NOTE — CARDIOLOGY SUMMARY
[de-identified] : \par 10/3/22 TTE: LVIDd 6.5, LVEF 19%, sev global LVSdysfunction, intermediate diastolic function, nml BiAtria, mld MR, mild AR, mi.d TR, Dilated IVC (2.6cm), RA pressure 15mmHg, RVSP 56mmHg\par \par TTE 2019: LVEF 10-15% LVIDd 7cm, mild MR, RVSP 26mmHg (RAP 8mmHg)\par  [de-identified] : \par [facility] 2019 LM normal, LAD ostial 100%, LCx 80%, RI normal, RCA mid 50%. S/p NERI to LCx. \par

## 2023-01-13 NOTE — PHYSICAL EXAM
[No Acute Distress] : no acute distress [Well-Appearing] : well-appearing [No JVD] : no jugular venous distention [Supple] : supple [No Respiratory Distress] : no respiratory distress  [Clear to Auscultation] : lungs were clear to auscultation bilaterally [Normal Rate] : normal rate  [Regular Rhythm] : with a regular rhythm [Normal S1, S2] : normal S1 and S2 [Soft] : abdomen soft [Non Tender] : non-tender [de-identified] : bilateral 1+ LE edema; skin dry/discoloration; non tender calves; pt statesfoot ulcer healed [de-identified] : abdomen disended and firm

## 2023-01-13 NOTE — REASON FOR VISIT
[Intake Visit] : an intake visit [Assessment] : an assessment [FreeTextEntry1] : 1/9/23: session #1; ITP to be reviewed and manually signed by Dr. Cross; Clinical indication for cardiac rehabilitation: chronic systolic heart failure

## 2023-01-13 NOTE — ASSESSMENT
[FreeTextEntry1] : Patient is a 61 year old male with past history of ICM, LVEF 19%, s/p S-ICD, CAD, AWSTEMI, s/p PCI 2028 and 2019, COPD, recently quit smoking, HTN, T2DM, CKD III, s/p hospitalization in [place] for COVID-19 infections, ceullitis, septic shock and MANJIT with temporary hemodialysis. Patient was admitted to [facility] on 9/30/22 for acute on chronic systolic HF exacerbation, discharged on 10/7/22.\par 10/25/22: intake appt:  Patient is adhering to prescribed medications, no acute complaints at present, in no acute distress. \par \par 1/9/23: Patient presents for session #1, cardiac rehab, in no acute distress.

## 2023-01-13 NOTE — PHYSICAL EXAM
[No Acute Distress] : no acute distress [Well-Appearing] : well-appearing [No JVD] : no jugular venous distention [Supple] : supple [No Respiratory Distress] : no respiratory distress  [Clear to Auscultation] : lungs were clear to auscultation bilaterally [Normal Rate] : normal rate  [Regular Rhythm] : with a regular rhythm [Normal S1, S2] : normal S1 and S2 [Soft] : abdomen soft [Non Tender] : non-tender [de-identified] : bilateral 1+ LE edema; skin dry/discoloration; non tender calves; pt statesfoot ulcer healed [de-identified] : abdomen disended and firm

## 2023-01-13 NOTE — REVIEW OF SYSTEMS
[Paroxysmal Nocturnal Dyspnea] : paroxysmal nocturnal dyspnea [Recent Change In Weight] : ~T no recent weight change [Hearing Loss] : no hearing loss [Chest Pain] : no chest pain [Palpitations] : no palpitations [Claudication] : no  leg claudication [Lower Ext Edema] : no lower extremity edema [Orthopena] : no orthopnea [Headache] : no headache [Dizziness] : no dizziness [Fainting] : no fainting [Negative] : Gastrointestinal [FreeTextEntry3] : has not seen eye doctor in 2 years or so

## 2023-01-13 NOTE — HISTORY OF PRESENT ILLNESS
[Type II Diabetes] : Type II Diabetes [Is Patient aware of signs and symptoms of hypoglycemia and hyperglycemia?] : patient is aware of signs and symptoms of hypoglycemia and hyperglycemia [Goal HgbA1c: ____] : [facility]: [unfilled] [Goal FBS: ____] : Goal FBS: [unfilled] [Overview of diabetes and cardiovascular disease] : overview of diabetes and cardiovascular disease [Hypoglycemia and Hyperglycemia: sign and symptoms] : Hypoglycemia and Hyperglycemia: sign and symptoms [Yes, continue with Diabetes plan] : Yes, continue with Diabetes plan [Former smoker] : former smoker [< 12 months] : < 12 months [Patient will have a relapse plan] : Patient will have a relapse plan [Patient will remain a non-smoker] : Patient will remain a non-smoker [Discussed overview of risks of continued use] : overview of risks of continued use [Assess patient's relevance of quitting] : assess patient's relevance of quitting [Explore rewards of quitting] : explore rewards of quitting [Examine possible roadblocks to quitting] : examine possible roadblocks to quitting [Continue repetition of the discussion] : continue repetition of the discussion [Provide patient with contact information and brochure for [facility] ([facility])] : provide patient with contact information and brochure for [facility] ([facility]) [Yes, continue with Smoking/Tobacco Cession plan] : Yes, continue with Smoking/Tobacco Cession plan [Height: ___] : Height: [unfilled] [Patient will lose 1 to 2 lb per week] : Patient will lose 1 to 2 lb per week [Monitoring of weight at home and at cardiac rehabilitation] : Monitoring of weight at home and at cardiac rehabilitation [Individualized exercise program as developed at cardiac rehabilitation] : Individualized exercise program as developed at cardiac rehabilitation [Calories and healthy weight management] : Calories and healthy weight management [Exercise and diet] : exercise and diet [Weight gain and heart failure implications] : weight gain and heart failure implications [Yes, continue with Weight Management plan] : Yes, continue with weight management plan [None] : none [Chronic systolic heart failure] : chronic systolic heart failure [Diabetes Mellitus] : diabetes mellitus [Sedentary] : sedentary [Overweight/Obesity] : overweight/obesity [Smoking] : smoking [Fall Risk] : fall risk [Other restrictions: ____] : [unfilled] [Cardiac Rehabilitation] : Cardiac Rehabilitation [___ Days per week] : [unfilled] days per week [>= 31 minutes per session] : >= 31 minutes per session [Target RPE: ___] : Target RPE: [unfilled] [Treadmill] : treadmill [Recumbent bike] : recumbent bike [Upright exercise bike] : upright exercise bike [BioStep] : BioStep [Elliptical] : elliptical [Upper body ergometer] : upper body ergometer [Stair Climber] : stair climber [Walking] : walking [Assess in ___ weeks] : assess in [unfilled] weeks [Stretching/ROM exercises and balance exercises daily] : Stretching/ROM exercises and balance exercises daily [Will assess for musculoskeletal and other restrictions] : will assess for musculoskeletal and other restrictions [Perform aerobic activity for ___ consecutive minutes] : perform aerobic activity for [unfilled] consecutive minutes [To increase my time spent in physical activity and/or aerobic exercise] : to increase my time spent in physical activity and/or aerobic exercise [Exercise Benefits/Guidelines education] : exercise benefits/guidelines education [Hemodynamic responses] : hemodynamic responses [Yes, per exercise prescription, policy] : Yes, per exercise prescription, policy [Self-reports of improved psychosocial well-being] : Self-reports of improved psychosocial well-being [Discuss overview of emotional health supportive modalities] : Discuss overview of emotional health supportive modalities [Relaxation breathing techniques] : relaxation breathing techniques [Stress management] : stress management [Yes, continue with psychosocial plan] : Yes, continue with psychosocial plan [Action] : Stage of change: Action [Hypertension] : Hypertension [Diabetes] : Diabetes [Heart Failure] : Heart Failure [Kidney Disease] : Kidney Disease [Obesity] : Obesity [Sugar] : sugar [Sodium] : sodium [Cholesterol] : cholesterol [Trans fats/saturated fats] : trans fats/saturated fats [Is patient on medication for hyperlipidemia?] : Is patient on medication for hyperlipidemia? Yes [Atorvastatin] : atorvastatin [Is Patient adherent with medication?] : patient is adherent with medication [Significant other] : significant other [Patient is interested in seeing a registered dietitian?] : Patient is interested in seeing a registered dietitian? Yes [Self-reports of improved health eating patterns] : Self-reports of improved health eating patterns [Discuss an overview of healthy eating plan] : Discuss an overview of healthy eating plan [Yes, continue with nutrition plan] : Yes, continue with nutrition plan [In progress] : in progress [FreeTextEntry2] : 1 ppd x 47 years [] : no [BP: ____ mmHg] : BP: [unfilled] mmHg [HR: ____ bpm] : BP: [unfilled] bpm [O2sat: ____ %] : O2sat: [unfilled] % [RPE: ____] : RPE: [unfilled]  [METS: ____] : METS: [unfilled]  [Equipment Orientation/Safety] : equipment orientation/safety [Individualized Exercise Rx] : individualized exercise Rx [Signs/Symptoms to report] : signs/symptoms to report [Patient verbalizes understanding of exercise education all questions answered] : Patient verbalizes understanding of exercise education all questions answered [Return demonstration] : return demonstration [FreeTextEntry1] : Dr. Guilherme Goodson [FreeTextEntry5] : Dr. Garrison Boykin [FreeTextEntry6] : walking up to 20 consecutive minutes [de-identified] : 1/9/232: NETS Biostep: 2.5 [FreeTextEntry9] : 1/9/23: [facility] COOP and PHQ9 pending patient completion\par 10/25/22: Patient states he has supportive wife and enjoys his family and 2 grandkids. He is looking forward to his son's wedding this week. Relaxation and breathing discussed as healthy coping method. [Teach back utilized] : teach back utilized [FreeTextEntry8] : 1/9/23: Rate Your Plate: pending patient completion\par 10/25/22: Patient would like the RD contact information at session #1-declined having it mailed out.  [FreeTextEntry7] : Improved RYP score\par 1/9/23: Daily weights; Salt and heart failure discussed with patient

## 2023-01-13 NOTE — CARDIOLOGY SUMMARY
[de-identified] : \par 10/3/22 TTE: LVIDd 6.5, LVEF 19%, sev global LVSdysfunction, intermediate diastolic function, nml BiAtria, mld MR, mild AR, mi.d TR, Dilated IVC (2.6cm), RA pressure 15mmHg, RVSP 56mmHg\par \par TTE 2019: LVEF 10-15% LVIDd 7cm, mild MR, RVSP 26mmHg (RAP 8mmHg)\par  [de-identified] : \par [facility] 2019 LM normal, LAD ostial 100%, LCx 80%, RI normal, RCA mid 50%. S/p NERI to LCx. \par

## 2023-01-13 NOTE — HISTORY OF PRESENT ILLNESS
[Type II Diabetes] : Type II Diabetes [Is Patient aware of signs and symptoms of hypoglycemia and hyperglycemia?] : patient is aware of signs and symptoms of hypoglycemia and hyperglycemia [Goal HgbA1c: ____] : [facility]: [unfilled] [Goal FBS: ____] : Goal FBS: [unfilled] [Overview of diabetes and cardiovascular disease] : overview of diabetes and cardiovascular disease [Hypoglycemia and Hyperglycemia: sign and symptoms] : Hypoglycemia and Hyperglycemia: sign and symptoms [Yes, continue with Diabetes plan] : Yes, continue with Diabetes plan [Former smoker] : former smoker [< 12 months] : < 12 months [Patient will have a relapse plan] : Patient will have a relapse plan [Patient will remain a non-smoker] : Patient will remain a non-smoker [Discussed overview of risks of continued use] : overview of risks of continued use [Assess patient's relevance of quitting] : assess patient's relevance of quitting [Explore rewards of quitting] : explore rewards of quitting [Examine possible roadblocks to quitting] : examine possible roadblocks to quitting [Continue repetition of the discussion] : continue repetition of the discussion [Provide patient with contact information and brochure for [facility] ([facility])] : provide patient with contact information and brochure for [facility] ([facility]) [Yes, continue with Smoking/Tobacco Cession plan] : Yes, continue with Smoking/Tobacco Cession plan [Height: ___] : Height: [unfilled] [Patient will lose 1 to 2 lb per week] : Patient will lose 1 to 2 lb per week [Monitoring of weight at home and at cardiac rehabilitation] : Monitoring of weight at home and at cardiac rehabilitation [Individualized exercise program as developed at cardiac rehabilitation] : Individualized exercise program as developed at cardiac rehabilitation [Calories and healthy weight management] : Calories and healthy weight management [Exercise and diet] : exercise and diet [Weight gain and heart failure implications] : weight gain and heart failure implications [Yes, continue with Weight Management plan] : Yes, continue with weight management plan [None] : none [Chronic systolic heart failure] : chronic systolic heart failure [Diabetes Mellitus] : diabetes mellitus [Sedentary] : sedentary [Overweight/Obesity] : overweight/obesity [Smoking] : smoking [Fall Risk] : fall risk [Other restrictions: ____] : [unfilled] [Cardiac Rehabilitation] : Cardiac Rehabilitation [___ Days per week] : [unfilled] days per week [>= 31 minutes per session] : >= 31 minutes per session [Target RPE: ___] : Target RPE: [unfilled] [Treadmill] : treadmill [Recumbent bike] : recumbent bike [Upright exercise bike] : upright exercise bike [BioStep] : BioStep [Elliptical] : elliptical [Upper body ergometer] : upper body ergometer [Stair Climber] : stair climber [Walking] : walking [Assess in ___ weeks] : assess in [unfilled] weeks [Stretching/ROM exercises and balance exercises daily] : Stretching/ROM exercises and balance exercises daily [Will assess for musculoskeletal and other restrictions] : will assess for musculoskeletal and other restrictions [Perform aerobic activity for ___ consecutive minutes] : perform aerobic activity for [unfilled] consecutive minutes [To increase my time spent in physical activity and/or aerobic exercise] : to increase my time spent in physical activity and/or aerobic exercise [Exercise Benefits/Guidelines education] : exercise benefits/guidelines education [Hemodynamic responses] : hemodynamic responses [Yes, per exercise prescription, policy] : Yes, per exercise prescription, policy [Self-reports of improved psychosocial well-being] : Self-reports of improved psychosocial well-being [Discuss overview of emotional health supportive modalities] : Discuss overview of emotional health supportive modalities [Relaxation breathing techniques] : relaxation breathing techniques [Stress management] : stress management [Yes, continue with psychosocial plan] : Yes, continue with psychosocial plan [Action] : Stage of change: Action [Hypertension] : Hypertension [Diabetes] : Diabetes [Heart Failure] : Heart Failure [Kidney Disease] : Kidney Disease [Obesity] : Obesity [Sugar] : sugar [Sodium] : sodium [Cholesterol] : cholesterol [Trans fats/saturated fats] : trans fats/saturated fats [Is patient on medication for hyperlipidemia?] : Is patient on medication for hyperlipidemia? Yes [Atorvastatin] : atorvastatin [Is Patient adherent with medication?] : patient is adherent with medication [Significant other] : significant other [Patient is interested in seeing a registered dietitian?] : Patient is interested in seeing a registered dietitian? Yes [Self-reports of improved health eating patterns] : Self-reports of improved health eating patterns [Discuss an overview of healthy eating plan] : Discuss an overview of healthy eating plan [Yes, continue with nutrition plan] : Yes, continue with nutrition plan [In progress] : in progress [FreeTextEntry2] : 1 ppd x 47 years [] : no [BP: ____ mmHg] : BP: [unfilled] mmHg [HR: ____ bpm] : BP: [unfilled] bpm [O2sat: ____ %] : O2sat: [unfilled] % [RPE: ____] : RPE: [unfilled]  [METS: ____] : METS: [unfilled]  [Equipment Orientation/Safety] : equipment orientation/safety [Individualized Exercise Rx] : individualized exercise Rx [Signs/Symptoms to report] : signs/symptoms to report [Patient verbalizes understanding of exercise education all questions answered] : Patient verbalizes understanding of exercise education all questions answered [Return demonstration] : return demonstration [FreeTextEntry1] : Dr. Guilherme Goodson [FreeTextEntry5] : Dr. Garrison Boykin [FreeTextEntry6] : walking up to 20 consecutive minutes [de-identified] : 1/9/232: [employer]S [employer]ostep: 2.5 [FreeTextEntry9] : 1/9/23: [facility] COOP and PHQ9 pending patient completion\par 10/25/22: Patient states he has supportive wife and enjoys his family and 2 grandkids. He is looking forward to his son's wedding this week. Relaxation and breathing discussed as healthy coping method. [Teach back utilized] : teach back utilized [FreeTextEntry8] : 1/9/23: Rate Your Plate: pending patient completion\par 10/25/22: Patient would like the RD contact information at session #1-declined having it mailed out.  [FreeTextEntry7] : Improved RYP score\par 1/9/23: Daily weights; Salt and heart failure discussed with patient

## 2023-01-13 NOTE — ASSESSMENT
[FreeTextEntry1] : Patient is a 61 year old male with past history of ICM, LVEF 19%, s/p S-ICD, CAD, AWSTEMI, s/p PCI 2028 and 2019, COPD, recently quit smoking, HTN, T2DM, CKD III, s/p hospitalization in [place] for COVID-19 infections, ceullitis, septic shock and MANJIT with temporary hemodialysis. Patient was admitted to [facility] on 9/30/22 for acute on chronic systolic HF exacerbation, discharged on 10/7/22.\par 10/25/22: intake appt:  Patient is adhering to prescribed medications, no acute complaints at present, in no acute distress. \par \par 1/9/23: Patient presents for session #1, cardiac rehab, in no acute distress.

## 2023-01-13 NOTE — PLAN
[FreeTextEntry1] : 1/9/23: Cardiac Rehab Phase 2\par \par ITP finalized- confer with Dr. Newton\par \par \par See session report for details.\par \par \par \par \par

## 2023-01-25 ENCOUNTER — APPOINTMENT (OUTPATIENT)
Dept: ELECTROPHYSIOLOGY | Facility: CLINIC | Age: 63
End: 2023-01-25
Payer: MEDICARE

## 2023-01-25 ENCOUNTER — NON-APPOINTMENT (OUTPATIENT)
Age: 63
End: 2023-01-25

## 2023-01-25 PROCEDURE — 93296 REM INTERROG EVL PM/IDS: CPT

## 2023-01-25 PROCEDURE — 93295 DEV INTERROG REMOTE 1/2/MLT: CPT

## 2023-01-26 ENCOUNTER — APPOINTMENT (OUTPATIENT)
Dept: WOUND CARE | Facility: CLINIC | Age: 63
End: 2023-01-26

## 2023-01-26 RX ORDER — MUPIROCIN 20 MG/G
2 OINTMENT TOPICAL
Qty: 1 | Refills: 3 | Status: ACTIVE | COMMUNITY
Start: 2023-01-26 | End: 1900-01-01

## 2023-01-30 ENCOUNTER — APPOINTMENT (OUTPATIENT)
Dept: WOUND CARE | Facility: CLINIC | Age: 63
End: 2023-01-30

## 2023-01-31 ENCOUNTER — APPOINTMENT (OUTPATIENT)
Dept: CARDIOLOGY | Facility: CLINIC | Age: 63
End: 2023-01-31
Payer: MEDICARE

## 2023-01-31 ENCOUNTER — NON-APPOINTMENT (OUTPATIENT)
Age: 63
End: 2023-01-31

## 2023-01-31 VITALS
BODY MASS INDEX: 30.23 KG/M2 | HEART RATE: 97 BPM | DIASTOLIC BLOOD PRESSURE: 66 MMHG | HEIGHT: 77 IN | SYSTOLIC BLOOD PRESSURE: 97 MMHG | WEIGHT: 256 LBS | OXYGEN SATURATION: 96 %

## 2023-01-31 DIAGNOSIS — Z95.5 PRESENCE OF CORONARY ANGIOPLASTY IMPLANT AND GRAFT: ICD-10-CM

## 2023-01-31 DIAGNOSIS — Z72.0 TOBACCO USE: ICD-10-CM

## 2023-01-31 DIAGNOSIS — I25.10 ATHEROSCLEROTIC HEART DISEASE OF NATIVE CORONARY ARTERY W/OUT ANGINA PECTORIS: ICD-10-CM

## 2023-01-31 DIAGNOSIS — R06.09 OTHER FORMS OF DYSPNEA: ICD-10-CM

## 2023-01-31 DIAGNOSIS — I10 ESSENTIAL (PRIMARY) HYPERTENSION: ICD-10-CM

## 2023-01-31 DIAGNOSIS — L97.503: ICD-10-CM

## 2023-01-31 DIAGNOSIS — I51.9 HEART DISEASE, UNSPECIFIED: ICD-10-CM

## 2023-01-31 DIAGNOSIS — I34.1 NONRHEUMATIC MITRAL (VALVE) PROLAPSE: ICD-10-CM

## 2023-01-31 DIAGNOSIS — I25.5 ISCHEMIC CARDIOMYOPATHY: ICD-10-CM

## 2023-01-31 DIAGNOSIS — Z95.810 PRESENCE OF AUTOMATIC (IMPLANTABLE) CARDIAC DEFIBRILLATOR: ICD-10-CM

## 2023-01-31 DIAGNOSIS — G47.33 OBSTRUCTIVE SLEEP APNEA (ADULT) (PEDIATRIC): ICD-10-CM

## 2023-01-31 PROCEDURE — 93000 ELECTROCARDIOGRAM COMPLETE: CPT

## 2023-01-31 PROCEDURE — 99214 OFFICE O/P EST MOD 30 MIN: CPT

## 2023-01-31 RX ORDER — LOSARTAN POTASSIUM 25 MG/1
25 TABLET, FILM COATED ORAL DAILY
Qty: 30 | Refills: 11 | Status: DISCONTINUED | COMMUNITY
Start: 2023-01-03 | End: 2023-01-31

## 2023-01-31 RX ORDER — SODIUM BICARBONATE 650 MG/1
650 TABLET ORAL
Qty: 270 | Refills: 3 | Status: DISCONTINUED | COMMUNITY
Start: 2023-01-03 | End: 2023-01-31

## 2023-01-31 RX ORDER — SACUBITRIL AND VALSARTAN 24; 26 MG/1; MG/1
24-26 TABLET, FILM COATED ORAL
Refills: 0 | Status: ACTIVE | COMMUNITY
Start: 2023-01-31

## 2023-01-31 NOTE — PHYSICAL EXAM
[Well Developed] : well developed [Well Nourished] : well nourished [Normal Conjunctiva] : normal conjunctiva [No Carotid Bruit] : no carotid bruit [Normal S1, S2] : normal S1, S2 [No Murmur] : no murmur [de-identified] :  in good spirits but overall  stable but clearly overweight weight weight today 256 pounds.  He had been down to weight of 240 [de-identified] : Clear to auscultation [de-identified] :  protuberant abdomen [de-identified] : 2+ pitting edema right lower extremity 1+ pitting edema left lower extremity

## 2023-01-31 NOTE — DISCUSSION/SUMMARY
[FreeTextEntry1] : 1.  Overall prognosis is guarded but he has defied the odds\par 2.  Continue Bumex 3 mg twice a day Daily weights every morning continue Entresto 24–26 twice a day, continue Jardiance.  We will hold off on spironolactone\par \par 3.  Follow-up again in 2 months\par 4.  I discussed the importance of diet weight loss\par 5.  Daily weights is the best way to monitor his diuretic need.  Heart failure had recommended a mems but both he and Dr. Boykin do not want to go that route.  Following his weights can be successful in monitoring his diuretic need I I have not seen [EKG obtained to assist in diagnosis and management of assessed problem(s)] : EKG obtained to assist in diagnosis and management of assessed problem(s)

## 2023-01-31 NOTE — REASON FOR VISIT
[FreeTextEntry1] : Rui Jessica with a severe ischemic cardiomyopathy chronic congestive heart failure here for follow-up of his cardiac status.  He was recently hospitalized approximately a month ago with hypotension.  His weight had gone up and he was started on metolazone which she did not tolerate.  He is followed by heart failure but presently he desires not to continue follow-up with them but to continue to follow with Dr. Boykin and myself

## 2023-01-31 NOTE — ASSESSMENT
[FreeTextEntry1] :  Abdulkadir has a severe ischemic cardiomyopathy with severe LV dysfunction with his last ejection fraction approximately 20 to 25% on echo at [facility].  He has chronic congestive heart failure both right and left-sided.  He did improve in the hospital with active diuresis and is being followed by the heart failure center.\par \par His weight continues to fluctuate and his diuretics continue to be manipulated.  He no longer desires to see the heart failure.  He recently apparently was admitted with hypotension on high dose of Entresto.\par \par Presently he is feeling better though his weight is still above his dry weight significantly but he does not feel lightheaded or dizzy and he feels his breathing is quite stable.\par \par The patient has a severe ischemic cardiomyopathy with chronic CHF borderline blood pressure, continues to smoke and is on a noncompliant diet.

## 2023-01-31 NOTE — HISTORY OF PRESENT ILLNESS
[FreeTextEntry1] :  in brief,  Rui is 62 years old with a long history of an ischemic cardiomyopathy.  He has had ejection fractions which have ranged between 10 and 30%.  He has had recurrent episodes of congestive heart failure.  He is status post stent to the ramus in 2018 at [facility]. At time of the catheterization he had a total occlusion of his ostial LAD total occlusion of the right coronary artery and a total occlusion of the ramus which was successfully recanalized and stented.  It however did not make a difference in his LV function\par \par .  He had returned from [place] with progressive shortness of breath and edema.  He was admitted to [facility] where he was seen by the heart failure center as well as myself.  He had excellent diuresis on an IV Bumex drip with loss of weight and feeling much better.  He was placed on Jardiance 10 continued on Toprol now up to 75 mg a day and Bumex was 2 mg 2 pills in the morning.  (Total 4 mg a day)\par \par  overall he is feeling better but is short of breath with mild exertion and recently has gained weight.  He was advised to increase his Bumex to 4 mg twice a day total 8 mg for the next few days as he is going to a wedding and is likely to have a salt intake which is high.\par \par   Overall he had been feeling better since his admission to nausea but still is short of breath with mild exertion and now has increasing edema and weight gain.  He is adherent to his medication.  He may not be that careful with salt intake\par \par He has been followed by heart failure team at [facility] and has been in rehab.  He apparently gained weight and he was started on metolazone but he did not tolerate it with nausea abdominal discomfort and he stopped it.  They had increased his Entresto to the highest dose.  He felt weak and tired and felt like he was going to pass out was brought emergently by ambulance to the emergency room [facility] where he was quite hypotensive.  His medications were rearranged and finally discharged home.\par \par Visit January 31, 2023: Overall the patient on lower doses of medication has been feeling better with good energy.  He continues to smoke and he does not follow any strict diet even low-salt diet.  He has some exertional shortness of breath but overall he functions at a good level.  He has had no further dizziness or syncope. \par \par Presently he is on Entresto 24–26 twice a day Bumex 3 mg twice a day.  He is no longer on spironolactone\par \par Presently his medications include:\par Bumex 3 mg twice a day, Plavix 75 aspirin 81 Jardiance 10 rosuvastatin 20 Entresto 24–26 twice a day (this dose has been lowered from the highest dose) he is no longer on spironolactone.

## 2023-02-06 ENCOUNTER — APPOINTMENT (OUTPATIENT)
Dept: WOUND CARE | Facility: CLINIC | Age: 63
End: 2023-02-06
Payer: MEDICARE

## 2023-02-06 PROCEDURE — 99213 OFFICE O/P EST LOW 20 MIN: CPT

## 2023-02-06 NOTE — ASSESSMENT
[FreeTextEntry1] : There is new ulceration on the lateral aspect of the left 5th toe which is shoe related.  HE is to avoid the tighter shoes he was wearing and resume the use of the wide [place].\par -for the wound which was debrided full thickness excisional, he is to apply mupirocin and DSD daily\par -the right ulcer is treated with full thickness escisional debridement with a #15 blade to the level of the sub Q but not beyond.\par -his insoles are modified with addition of a met pad on top and u duspersion with 1/4" felt on the plan

## 2023-02-06 NOTE — HISTORY OF PRESENT ILLNESS
[FreeTextEntry1] : Seen for follow up evaluation of a wound on the plantar aspect of the right foot.  States that he has recently started with the use of multidensity insoles.  Denies f/c/n/v/d.\par

## 2023-02-13 NOTE — ASSESSMENT
[FreeTextEntry1] : 62 M with RIght foot plantar submet 3rd wound to subQ, submet 1 wound healed \par - Pt seen and evaluated.\par - Right foot submet 3 wound down to subQ surrounded by hyperkeratotic skin, no drainage, no signs of infection.\par - left foot 5th digit dorsal wound to subQ, with hyperkeraototic edge, midly undermined, no signs of infection \par - sharp excisional debridement to the B/L wounds to the level of and not beyond subQ using# 15 blade\par - Pt to continue wearing NB shoes with supporting insoles at at times \par - Wound care with mupirocin and DSD daily.\par - Patient to return to clinic next visit in the AM for visit with Reji about shoe orthotics. \par - RTC in 2 weeks.

## 2023-02-13 NOTE — HISTORY OF PRESENT ILLNESS
[FreeTextEntry1] : Seen for follow up evaluation of a wound on the plantar aspect of the right foot.\par \par 61 yo DM pt. presents to wound center for follow-up of submet 3 wound of the right foot. Patient has h/o LF partial 3rd ray resection on 1/2022. He denies any trouble walking, or pain in the left foot. Pt ambulates to clinic today wearing inserts and NB sneakers. Patient went to [facility] and was seen by Dr. Sigala for osteomyelitis, for admission to the hospital for SOB and fluid around his heart x1 week. Pt is applying mupirocin to wounds daily. Pt denies any F/C/N/V/SOB or other complaints

## 2023-02-27 ENCOUNTER — APPOINTMENT (OUTPATIENT)
Dept: WOUND CARE | Facility: CLINIC | Age: 63
End: 2023-02-27
Payer: MEDICARE

## 2023-02-27 PROCEDURE — 99213 OFFICE O/P EST LOW 20 MIN: CPT

## 2023-03-01 ENCOUNTER — INPATIENT (INPATIENT)
Facility: HOSPITAL | Age: 63
LOS: 9 days | Discharge: HOME HEALTH | DRG: 871 | End: 2023-03-11
Attending: EMERGENCY MEDICINE | Admitting: EMERGENCY MEDICINE
Payer: MEDICARE

## 2023-03-01 VITALS
RESPIRATION RATE: 22 BRPM | SYSTOLIC BLOOD PRESSURE: 86 MMHG | DIASTOLIC BLOOD PRESSURE: 53 MMHG | OXYGEN SATURATION: 96 % | HEART RATE: 107 BPM | TEMPERATURE: 99 F

## 2023-03-01 DIAGNOSIS — Z98.52 VASECTOMY STATUS: Chronic | ICD-10-CM

## 2023-03-01 DIAGNOSIS — R09.02 HYPOXEMIA: ICD-10-CM

## 2023-03-01 LAB
ALBUMIN SERPL ELPH-MCNC: 3.9 G/DL — SIGNIFICANT CHANGE UP (ref 3.3–5)
ALP SERPL-CCNC: 123 U/L — HIGH (ref 40–120)
ALT FLD-CCNC: 18 U/L — SIGNIFICANT CHANGE UP (ref 10–45)
ANION GAP SERPL CALC-SCNC: 19 MMOL/L — HIGH (ref 5–17)
ANISOCYTOSIS BLD QL: SLIGHT — SIGNIFICANT CHANGE UP
APPEARANCE UR: CLEAR — SIGNIFICANT CHANGE UP
APTT BLD: 33.2 SEC — SIGNIFICANT CHANGE UP (ref 27.5–35.5)
AST SERPL-CCNC: 23 U/L — SIGNIFICANT CHANGE UP (ref 10–40)
BACTERIA # UR AUTO: NEGATIVE — SIGNIFICANT CHANGE UP
BASE EXCESS BLDV CALC-SCNC: -1.5 MMOL/L — SIGNIFICANT CHANGE UP (ref -2–3)
BASE EXCESS BLDV CALC-SCNC: -2.2 MMOL/L — LOW (ref -2–3)
BASOPHILS # BLD AUTO: 0 K/UL — SIGNIFICANT CHANGE UP (ref 0–0.2)
BASOPHILS NFR BLD AUTO: 0 % — SIGNIFICANT CHANGE UP (ref 0–2)
BILIRUB SERPL-MCNC: 1.2 MG/DL — SIGNIFICANT CHANGE UP (ref 0.2–1.2)
BILIRUB UR-MCNC: ABNORMAL
BUN SERPL-MCNC: 54 MG/DL — HIGH (ref 7–23)
CA-I SERPL-SCNC: 1.07 MMOL/L — LOW (ref 1.15–1.33)
CA-I SERPL-SCNC: 1.09 MMOL/L — LOW (ref 1.15–1.33)
CALCIUM SERPL-MCNC: 8.6 MG/DL — SIGNIFICANT CHANGE UP (ref 8.4–10.5)
CHLORIDE BLDV-SCNC: 96 MMOL/L — SIGNIFICANT CHANGE UP (ref 96–108)
CHLORIDE BLDV-SCNC: 97 MMOL/L — SIGNIFICANT CHANGE UP (ref 96–108)
CHLORIDE SERPL-SCNC: 95 MMOL/L — LOW (ref 96–108)
CO2 BLDV-SCNC: 24 MMOL/L — SIGNIFICANT CHANGE UP (ref 22–26)
CO2 BLDV-SCNC: 25 MMOL/L — SIGNIFICANT CHANGE UP (ref 22–26)
CO2 SERPL-SCNC: 22 MMOL/L — SIGNIFICANT CHANGE UP (ref 22–31)
COLOR SPEC: YELLOW — SIGNIFICANT CHANGE UP
CREAT SERPL-MCNC: 2.44 MG/DL — HIGH (ref 0.5–1.3)
DIFF PNL FLD: NEGATIVE — SIGNIFICANT CHANGE UP
EGFR: 30 ML/MIN/1.73M2 — LOW
ELLIPTOCYTES BLD QL SMEAR: SLIGHT — SIGNIFICANT CHANGE UP
EOSINOPHIL # BLD AUTO: 0 K/UL — SIGNIFICANT CHANGE UP (ref 0–0.5)
EOSINOPHIL NFR BLD AUTO: 0 % — SIGNIFICANT CHANGE UP (ref 0–6)
EPI CELLS # UR: 0 — SIGNIFICANT CHANGE UP
FLUAV AG NPH QL: SIGNIFICANT CHANGE UP
FLUBV AG NPH QL: SIGNIFICANT CHANGE UP
GAS PNL BLDV: 131 MMOL/L — LOW (ref 136–145)
GAS PNL BLDV: 132 MMOL/L — LOW (ref 136–145)
GAS PNL BLDV: SIGNIFICANT CHANGE UP
GLUCOSE BLDV-MCNC: 301 MG/DL — HIGH (ref 70–99)
GLUCOSE BLDV-MCNC: 323 MG/DL — HIGH (ref 70–99)
GLUCOSE SERPL-MCNC: 283 MG/DL — HIGH (ref 70–99)
GLUCOSE UR QL: ABNORMAL
HCO3 BLDV-SCNC: 23 MMOL/L — SIGNIFICANT CHANGE UP (ref 22–29)
HCO3 BLDV-SCNC: 24 MMOL/L — SIGNIFICANT CHANGE UP (ref 22–29)
HCT VFR BLD CALC: 42.4 % — SIGNIFICANT CHANGE UP (ref 39–50)
HCT VFR BLDA CALC: 38 % — LOW (ref 39–51)
HCT VFR BLDA CALC: 39 % — SIGNIFICANT CHANGE UP (ref 39–51)
HGB BLD CALC-MCNC: 12.6 G/DL — SIGNIFICANT CHANGE UP (ref 12.6–17.4)
HGB BLD CALC-MCNC: 13 G/DL — SIGNIFICANT CHANGE UP (ref 12.6–17.4)
HGB BLD-MCNC: 12.8 G/DL — LOW (ref 13–17)
HYALINE CASTS # UR AUTO: 0 /LPF — SIGNIFICANT CHANGE UP (ref 0–7)
INR BLD: 1.69 RATIO — HIGH (ref 0.88–1.16)
KETONES UR-MCNC: NEGATIVE — SIGNIFICANT CHANGE UP
LACTATE BLDV-MCNC: 2 MMOL/L — SIGNIFICANT CHANGE UP (ref 0.5–2)
LACTATE BLDV-MCNC: 2.8 MMOL/L — HIGH (ref 0.5–2)
LACTATE SERPL-SCNC: 2.3 MMOL/L — HIGH (ref 0.5–2)
LEUKOCYTE ESTERASE UR-ACNC: NEGATIVE — SIGNIFICANT CHANGE UP
LYMPHOCYTES # BLD AUTO: 0 % — LOW (ref 13–44)
LYMPHOCYTES # BLD AUTO: 0 K/UL — LOW (ref 1–3.3)
MANUAL SMEAR VERIFICATION: SIGNIFICANT CHANGE UP
MCHC RBC-ENTMCNC: 23.7 PG — LOW (ref 27–34)
MCHC RBC-ENTMCNC: 30.2 GM/DL — LOW (ref 32–36)
MCV RBC AUTO: 78.5 FL — LOW (ref 80–100)
MICROCYTES BLD QL: SLIGHT — SIGNIFICANT CHANGE UP
MONOCYTES # BLD AUTO: 0.84 K/UL — SIGNIFICANT CHANGE UP (ref 0–0.9)
MONOCYTES NFR BLD AUTO: 4.4 % — SIGNIFICANT CHANGE UP (ref 2–14)
NEUTROPHILS # BLD AUTO: 18.35 K/UL — HIGH (ref 1.8–7.4)
NEUTROPHILS NFR BLD AUTO: 90.4 % — HIGH (ref 43–77)
NEUTS BAND # BLD: 5.2 % — SIGNIFICANT CHANGE UP (ref 0–8)
NITRITE UR-MCNC: NEGATIVE — SIGNIFICANT CHANGE UP
PCO2 BLDV: 41 MMHG — LOW (ref 42–55)
PCO2 BLDV: 41 MMHG — LOW (ref 42–55)
PH BLDV: 7.36 — SIGNIFICANT CHANGE UP (ref 7.32–7.43)
PH BLDV: 7.37 — SIGNIFICANT CHANGE UP (ref 7.32–7.43)
PH UR: 6 — SIGNIFICANT CHANGE UP (ref 5–8)
PLAT MORPH BLD: NORMAL — SIGNIFICANT CHANGE UP
PLATELET # BLD AUTO: 130 K/UL — LOW (ref 150–400)
PO2 BLDV: 45 MMHG — SIGNIFICANT CHANGE UP (ref 25–45)
PO2 BLDV: 47 MMHG — HIGH (ref 25–45)
POIKILOCYTOSIS BLD QL AUTO: SLIGHT — SIGNIFICANT CHANGE UP
POLYCHROMASIA BLD QL SMEAR: SLIGHT — SIGNIFICANT CHANGE UP
POTASSIUM BLDV-SCNC: 2.7 MMOL/L — CRITICAL LOW (ref 3.5–5.1)
POTASSIUM BLDV-SCNC: 3 MMOL/L — LOW (ref 3.5–5.1)
POTASSIUM SERPL-MCNC: 2.8 MMOL/L — CRITICAL LOW (ref 3.5–5.3)
POTASSIUM SERPL-SCNC: 2.8 MMOL/L — CRITICAL LOW (ref 3.5–5.3)
PROT SERPL-MCNC: 7.6 G/DL — SIGNIFICANT CHANGE UP (ref 6–8.3)
PROT UR-MCNC: ABNORMAL
PROTHROM AB SERPL-ACNC: 19.7 SEC — HIGH (ref 10.5–13.4)
RBC # BLD: 5.4 M/UL — SIGNIFICANT CHANGE UP (ref 4.2–5.8)
RBC # FLD: 20.3 % — HIGH (ref 10.3–14.5)
RBC BLD AUTO: ABNORMAL
RBC CASTS # UR COMP ASSIST: 2 /HPF — SIGNIFICANT CHANGE UP (ref 0–4)
RSV RNA NPH QL NAA+NON-PROBE: SIGNIFICANT CHANGE UP
SAO2 % BLDV: 73.6 % — SIGNIFICANT CHANGE UP (ref 67–88)
SAO2 % BLDV: 76.1 % — SIGNIFICANT CHANGE UP (ref 67–88)
SARS-COV-2 RNA SPEC QL NAA+PROBE: SIGNIFICANT CHANGE UP
SODIUM SERPL-SCNC: 136 MMOL/L — SIGNIFICANT CHANGE UP (ref 135–145)
SP GR SPEC: 1.02 — SIGNIFICANT CHANGE UP (ref 1.01–1.02)
TROPONIN T, HIGH SENSITIVITY RESULT: 101 NG/L — HIGH (ref 0–51)
UROBILINOGEN FLD QL: ABNORMAL
WBC # BLD: 19.19 K/UL — HIGH (ref 3.8–10.5)
WBC # FLD AUTO: 19.19 K/UL — HIGH (ref 3.8–10.5)
WBC UR QL: 1 /HPF — SIGNIFICANT CHANGE UP (ref 0–5)

## 2023-03-01 PROCEDURE — 76604 US EXAM CHEST: CPT | Mod: 26

## 2023-03-01 PROCEDURE — 71275 CT ANGIOGRAPHY CHEST: CPT | Mod: 26,MA

## 2023-03-01 PROCEDURE — 99291 CRITICAL CARE FIRST HOUR: CPT

## 2023-03-01 PROCEDURE — 99291 CRITICAL CARE FIRST HOUR: CPT | Mod: 25

## 2023-03-01 PROCEDURE — 71045 X-RAY EXAM CHEST 1 VIEW: CPT | Mod: 26

## 2023-03-01 PROCEDURE — 99292 CRITICAL CARE ADDL 30 MIN: CPT

## 2023-03-01 PROCEDURE — 93308 TTE F-UP OR LMTD: CPT | Mod: 26

## 2023-03-01 RX ORDER — SODIUM CHLORIDE 9 MG/ML
500 INJECTION INTRAMUSCULAR; INTRAVENOUS; SUBCUTANEOUS ONCE
Refills: 0 | Status: COMPLETED | OUTPATIENT
Start: 2023-03-01 | End: 2023-03-01

## 2023-03-01 RX ORDER — CEFEPIME 1 G/1
1000 INJECTION, POWDER, FOR SOLUTION INTRAMUSCULAR; INTRAVENOUS ONCE
Refills: 0 | Status: COMPLETED | OUTPATIENT
Start: 2023-03-01 | End: 2023-03-01

## 2023-03-01 RX ORDER — INSULIN LISPRO 100/ML
VIAL (ML) SUBCUTANEOUS EVERY 6 HOURS
Refills: 0 | Status: DISCONTINUED | OUTPATIENT
Start: 2023-03-01 | End: 2023-03-02

## 2023-03-01 RX ORDER — CEFEPIME 1 G/1
2000 INJECTION, POWDER, FOR SOLUTION INTRAMUSCULAR; INTRAVENOUS EVERY 12 HOURS
Refills: 0 | Status: DISCONTINUED | OUTPATIENT
Start: 2023-03-01 | End: 2023-03-03

## 2023-03-01 RX ORDER — ACETAMINOPHEN 500 MG
975 TABLET ORAL ONCE
Refills: 0 | Status: COMPLETED | OUTPATIENT
Start: 2023-03-01 | End: 2023-03-01

## 2023-03-01 RX ORDER — POTASSIUM CHLORIDE 20 MEQ
10 PACKET (EA) ORAL
Refills: 0 | Status: COMPLETED | OUTPATIENT
Start: 2023-03-01 | End: 2023-03-02

## 2023-03-01 RX ORDER — HEPARIN SODIUM 5000 [USP'U]/ML
5000 INJECTION INTRAVENOUS; SUBCUTANEOUS EVERY 8 HOURS
Refills: 0 | Status: DISCONTINUED | OUTPATIENT
Start: 2023-03-01 | End: 2023-03-02

## 2023-03-01 RX ORDER — POTASSIUM CHLORIDE 20 MEQ
40 PACKET (EA) ORAL
Refills: 0 | Status: COMPLETED | OUTPATIENT
Start: 2023-03-01 | End: 2023-03-01

## 2023-03-01 RX ORDER — VANCOMYCIN HCL 1 G
1000 VIAL (EA) INTRAVENOUS ONCE
Refills: 0 | Status: COMPLETED | OUTPATIENT
Start: 2023-03-01 | End: 2023-03-01

## 2023-03-01 RX ORDER — CHLORHEXIDINE GLUCONATE 213 G/1000ML
1 SOLUTION TOPICAL
Refills: 0 | Status: DISCONTINUED | OUTPATIENT
Start: 2023-03-01 | End: 2023-03-11

## 2023-03-01 RX ORDER — NOREPINEPHRINE BITARTRATE/D5W 8 MG/250ML
0.04 PLASTIC BAG, INJECTION (ML) INTRAVENOUS
Qty: 8 | Refills: 0 | Status: DISCONTINUED | OUTPATIENT
Start: 2023-03-01 | End: 2023-03-05

## 2023-03-01 RX ADMIN — Medication 40 MILLIEQUIVALENT(S): at 18:58

## 2023-03-01 RX ADMIN — Medication 250 MILLIGRAM(S): at 19:35

## 2023-03-01 RX ADMIN — Medication 30 MILLILITER(S): at 18:59

## 2023-03-01 RX ADMIN — Medication 100 MILLIEQUIVALENT(S): at 22:38

## 2023-03-01 RX ADMIN — Medication 975 MILLIGRAM(S): at 18:58

## 2023-03-01 RX ADMIN — Medication 7 MICROGRAM(S)/KG/MIN: at 21:02

## 2023-03-01 RX ADMIN — Medication 975 MILLIGRAM(S): at 19:28

## 2023-03-01 RX ADMIN — Medication 40 MILLIEQUIVALENT(S): at 21:59

## 2023-03-01 RX ADMIN — SODIUM CHLORIDE 500 MILLILITER(S): 9 INJECTION INTRAMUSCULAR; INTRAVENOUS; SUBCUTANEOUS at 18:30

## 2023-03-01 RX ADMIN — CEFEPIME 100 MILLIGRAM(S): 1 INJECTION, POWDER, FOR SOLUTION INTRAMUSCULAR; INTRAVENOUS at 18:59

## 2023-03-01 NOTE — H&P ADULT - ATTENDING COMMENTS
Pt is chronically ill at baseline with low EF s/p AICD and chronic kidney disease. Pt followed by HF as well as cardio and nephro. Pt is being followed by wound care for foot wounds and wife does mention a wound on the right shin and states he has become septic from wounds in the past. States that last night started to have fever and looked unwell. He continued to have fevers and had a few episodes of vomiting and decreased urine from his baseline. Pt is on entresto as well as bumex. Did not take nighttime bumex and did not take nighttime entresto, In the ED pt received bolus however judicious in nature due to extensive hx.      Will cover pt broadly does not seem to have any history of ESBL so will continue current abx. Following up official CT results although hydronephrosis appears chronic. Will admit to MICU as he has high likelihood of clinical decompensation. Does not appear grossly overloaded however IVC plethoric with scattered B lines.     Will treat fevers and continue abx at this time no obvious source of infection.   Will gently volume resuscitate and maintain levo to avoid ATN from hypotension, concern for ATN from prerenal as well as septic causes. MAP goal 65  strict I&O and will consult nephrology   Will hold BP meds while pt is hypotensive.   NPO at this time.    Pt is a FULL code,  Discussed with family member at bedside.

## 2023-03-01 NOTE — ED ADULT NURSE NOTE - INTERVENTIONS DEFINITIONS
Ruby to call system/Call bell, personal items and telephone within reach/Instruct patient to call for assistance/Reinforce activity limits and safety measures with patient and family

## 2023-03-01 NOTE — H&P ADULT - HISTORY OF PRESENT ILLNESS
This is a 62 year old male with pmh of HFrEF with an EF of 19% status post AICD, CAD status post stents, DM 2, HTN, COPD with a 30+ pack year history of smoking presents to the ED with shortness of breath that started around 5pm after dinner. Also vomited x3. 1x diarrhea.  As per EMS, patient was noted to be hypoxic to 85% on room air started on nasal cannula.  Noted to be hypotensive in the field and immediately brought to the ED for further evaluation. Endorses chornic cough and feeling more fatigued than usual but otherwise denies any chest pain, abd pain, headaches, numbness/tingling. Of note, patient has history of   In the ED, febrile to 38.2 C, started on 6L nasal cannula, saturating in mid 90s. Noted to be febrile. Systolic BP to be in 70s and 90s. 500 mL bolus given with no adequate response. Levo gtt started. MAP of 75 on levo 0.05. WBC elevated at 19k, Hgb of 12.8, lactate elevated at 2.3, Trop at 101 and BNP at 6243. No signs of UTI on the urinalysis. VBG with pH 7.36/41/45/23. Chest x ray clear. CT angio chest shows no evidence of PE detected, however showing small R pleural effusion and severe right hydronephrosis. CT abd/pelv w/o contrast pending.   s/p Empiric Cefepime 1g and Vanc 1g   This is a 62 year old male with pmh of HFrEF with an EF of 19% status post AICD, CAD status post stents, DM 2, HTN, COPD with a 30+ pack year history of smoking presents to the ED with shortness of breath that started around 5pm after dinner. Also vomited x3. 1x diarrhea.  As per EMS, patient was noted to be hypoxic to 85% on room air started on nasal cannula.  Noted to be hypotensive in the field and immediately brought to the ED for further evaluation. Endorses chornic cough and feeling more fatigued than usual but otherwise denies any chest pain, abd pain, headaches, numbness/tingling. Of note, patient has history of chronic ulcer of R 3rd foot and L foot 5th digit dorsal wound  In the ED, febrile to 38.2 C, started on 6L nasal cannula, saturating in mid 90s. Noted to be febrile. Systolic BP to be in 70s and 90s. 500 mL bolus given with no adequate response. Levo gtt started. MAP of 75 on levo 0.05. WBC elevated at 19k, Hgb of 12.8, lactate elevated at 2.3, Trop at 101 and BNP at 6243. No signs of UTI on the urinalysis. VBG with pH 7.36/41/45/23. Chest x ray clear. CT angio chest shows no evidence of PE detected, however showing small R pleural effusion and severe right hydronephrosis. CT abd/pelv w/o contrast pending.   s/p Empiric Cefepime 1g and Vanc 1g, Tylenol 1g.

## 2023-03-01 NOTE — H&P ADULT - ASSESSMENT
This is a 62 year old male with pmh of HFrEF with an EF of 19% status post AICD, CAD status post stents, DM 2, HTN, COPD with a 30+ pack year history of smoking presents to the ED with shortness of breath, vomiting, diarrhea, hypoxia to mid 80s without NC --> 99% on 6L NC, hypotension requiring pressors in setting of fever admitted to MICU for further management.     Neuro This is a 62 year old male with pmh of HFrEF with an EF of 19% status post AICD, CAD status post stents, DM 2, HTN, COPD with a 30+ pack year history of smoking presents to the ED with shortness of breath, vomiting, diarrhea, hypoxia to mid 80s without NC --> 99% on 6L NC, hypotension requiring pressors in setting of fever admitted to MICU for further management.     --------------------------------------------------    Neuro  - A&O x3  - Not on any sedation    ------------------------------------------------------    Cardiovascular  # shock in setting of HFrEF of 19% s/p AICD 2/2 possible sepsis  - On levo gtt, wean as tolerated.     #HTN  - Will hold home meds in setting of hypotension    -----------------------------------------------------------    Respiratory  # hypoxia  - on Nasal Cannula 6L, saturating above 98%, wean as tolerated.   - Not acidotic or alkalotic on VBG. Will monitor  - CT angio chest showing no evidence of PE although limited by motion. Small right pleural effusion.     ------------------------------------------------------------------    GI  # Nausea, vomiting, diarrhea  - Zofran as needed  - Will monitor symptoms and electrolytes. Replete as needed    # Severe R hydronephrosis seen on CT chest  - Pending CT abd/pelv w/o contrast to further evaluate    -----------------------------------------------------------------------    Renal  #ATN in setting of shock  - Creatinine elevated at 2.44  - Baseline around 1.5 to 1.8  - Will monitor and trend.     --------------------------------------------------------------------------    Endo  #T2DM  -  40 units of Lantus at home    ----------------------------------------------------------------------    Heme  - No active issues  - DVT PPX?    ---------------------------------------------------------------------    ID  #shock 2/2 possible sepsis with unclear etiology.  - Febrile. WBC elevated  - s/p Cefepime and Vanc in the ED.  - Cont with cefepime.   - F/u BCx x2 and UCx  - No clear etiology as of now.  - Urinalysis negative, R hydronephrosis. CT abd/pelv pending  - CT chest with R pleural effusion.    ------------------------------------------------------------------------  Ethics   This is a 62 year old male with pmh of HFrEF with an EF of 19% status post AICD, CAD status post stents, DM 2, HTN, COPD with a 30+ pack year history of smoking presents to the ED with shortness of breath, vomiting, diarrhea, hypoxia to mid 80s without NC --> 99% on 6L NC, hypotension requiring pressors in setting of fever admitted to MICU for further management.     --------------------------------------------------    Neuro  - A&O x4  - Not on any sedation    ------------------------------------------------------    Cardiovascular  # shock in setting of HFrEF of 19% s/p AICD 2/2 likely sepsis  - On levo gtt, wean as tolerated.     #HTN  - Will hold home meds in setting of hypotension    -----------------------------------------------------------    Respiratory  # hypoxia  - on Nasal Cannula 6L, saturating above 98%, wean as tolerated.   - Not acidotic or alkalotic on VBG. Will monitor  - CT angio chest showing no evidence of PE although limited by motion. Small right pleural effusion.     ------------------------------------------------------------------    GI  # Nausea, vomiting, diarrhea  - Zofran as needed  - Will monitor symptoms and electrolytes. Replete as needed    # Severe R hydronephrosis seen on CT chest  - Pending CT abd/pelv w/o contrast to further evaluate    -----------------------------------------------------------------------    Renal  #ATN in setting of shock  - Creatinine elevated at 2.44  - Baseline around 1.5 to 1.8  - Will monitor and trend.     --------------------------------------------------------------------------    Endo  #T2DM  -  40 units of Lantus at home    ----------------------------------------------------------------------    Heme  - No active issues  - DVT PPX?    ---------------------------------------------------------------------    ID  #shock 2/2 likely sepsis with unclear etiology.  - Febrile. WBC elevated  - s/p Cefepime and Vanc in the ED.  - Cont with cefepime.   - F/u BCx x2 and UCx  - No clear etiology as of now.  - Urinalysis negative, R hydronephrosis. CT abd/pelv pending  - CT chest with R pleural effusion.    ------------------------------------------------------------------------  Ethics   This is a 62 year old male with pmh of HFrEF with an EF of 19% status post AICD, CAD status post stents, DM 2, HTN, COPD with a 30+ pack year history of smoking presents to the ED with shortness of breath, vomiting, diarrhea, hypoxia to mid 80s without NC --> 99% on 1L NC, hypotension requiring pressors in setting of fever admitted to MICU for further management.     --------------------------------------------------    Neuro  - A&O x4  - Not on any sedation    ------------------------------------------------------    Cardiovascular  # septic vs hypovolemic shock in setting of HFrEF of 19% s/p AICD  - 3 episode of vomiting, 1 episode of watery diarrhea in setting of poor PO intake, concerning for possible hypovolemic shock.   - On levo gtt, wean as tolerated.   - EKG shows sinus tachycardia  - POCUS shows severe LV systolic dysfunction with plethoric IVC, scattered B lines throughout  - BNP elevated 6243. Trop elevated at 101 likely in setting of demand from septic shock.     #HTN  - Will hold home meds in setting of hypotension  - At home takes, Bumex 2mg tid, Entresto 24/26 BID, Metoprolol  qD, Spironolactone 25mg qD.    #CAD   -  HFrEF of 19% s/p AICD, GDMT  - Cont Plavix and aspirin.     -----------------------------------------------------------    Respiratory  # hypoxia in setting of poor perfusion  - on Nasal Cannula 1L, saturating above 98%, wean as tolerated.   - Not acidotic or alkalotic on VBG. Will monitor  - CT angio chest showing no evidence of PE although limited by motion. Small right pleural effusion.   - Not concerning for volume overload picture at this time, will continues gentle diuresis.     ------------------------------------------------------------------    GI  # Nausea, vomiting, diarrhea  - Zofran as needed  - Will monitor symptoms and electrolytes. Replete as needed    # Severe R hydronephrosis seen on CT chest  - Hx of R hydronephrosis in 2020, currently presentation likely chronic.   - Pending CT abd/pelv w/o contrast to further evaluate    -----------------------------------------------------------------------    Renal  #ATN in setting of shock  - Creatinine elevated at 2.44  - Baseline around 1.5 to 1.8  - Will monitor and trend. Avoid nephrotoxins and renally dose meds.     --------------------------------------------------------------------------    Endo  #T2DM  -  40 units of Lantus p.m. at home  - Will cont at 20 units of Lantus and ISS.     ----------------------------------------------------------------------    Heme  - No active issues  - DVT PPX: Lovenox 40 qD.     ---------------------------------------------------------------------    ID  #shock 2/2 likely sepsis with unclear etiology.  - Febrile. WBC elevated to 19k.  - s/p Cefepime and Vanc in the ED.  - Cont with cefepime.   - f/u MRSA/MSSA swab  - F/u BCx x2 and UCx  - No clear etiology as of now.  - Urinalysis negative, chronic R hydronephrosis. CT abd/pelv pending  - CT chest with R pleural effusion, with no signs of pneumonia.     ------------------------------------------------------------------------  Ethics  - Full code    Diet   - Regular, consistent carb diet.

## 2023-03-01 NOTE — H&P ADULT - NSHPREVIEWOFSYSTEMS_GEN_ALL_CORE
GENERAL: + fever, + chills  	EYES: No change in vision  	HEENT: No trouble swallowing or speaking  	CARDIAC: No chest pain  	PULMONARY: + cough, + SOB  	GI: No abdominal pain, + nausea, + vomiting, + diarrhea, no constipation  	: No changes in urination  	SKIN: No rashes  	NEURO: No headache, no numbness  	MSK: No visible bony deformity   Otherwise as HPI or negative.

## 2023-03-01 NOTE — CONSULT NOTE ADULT - ASSESSMENT
62M w/ hx of HTN, DMII, CAD (s/p stents), HFrEF (EF 19%, s/p ICD), COPD, presenting with shortness of breath and fatigue, found to have hypoxia, fever, leukocytosis, hypotension w/ evidence of end-organ damage, overall concerning for shock. CCU consulted for evaluation for CCU admission.    Pt with known hx of HFrEF, however currently pt without edema, pulmonary congestion, cool extremities, to suggest clinical cardiogenic shock. Warm extremities, fever, leukocytosis, clear lungs, overall more consistent with distributive shock 2/2 sepsis. EKG similar to prior, trop-hs mild elevation 101, chest pain free, overall more consistent with type II MI 2/2 demand.    Pt BP stabilized with levo gtt. MICU team consulted and accepted pt to MICU for further care.

## 2023-03-01 NOTE — ED PROVIDER NOTE - CLINICAL SUMMARY MEDICAL DECISION MAKING FREE TEXT BOX
62-year-old male with a past medical history of HFrEF with an EF of 19% status post AICD, CAD status post stents, DM 2, HTN, COPD with a 30+ pack year history of smoking presents to the ED with shortness of breath. Initial vitals notable for hypoxia, started on 6 L nasal cannula saturating in the mid 90s.  Patient was noted to be febrile, Tylenol given.  Initial systolic blood pressures noted to be in the 70s to 80s, will resuscitate with small fluid bolus.  Bedside echo notable for grossly depressed left systolic function, plethoric IVC, and scattered B-lines throughout bilateral lung fields.  Physical exam as noted above.  Patient appears tachypneic and in mild respiratory distress, coarse bilateral breath sounds auscultated.  No lower extremity edema present.  Abdomen is soft nontender without guarding or rebound tenderness, obese abdomen.  Differential diagnosis includes but not limited to PE versus shock state secondary to sepsis versus cardiogenic causes.  Will evaluate for metabolic derangements versus viral syndrome.  Will order labs, EKG, meds, imaging, and reassess

## 2023-03-01 NOTE — H&P ADULT - NSHPPHYSICALEXAM_GEN_ALL_CORE
General: NAD, Speaking on full sentences on Nasal cannula.   HEENT: NCAT.   Cardiac: RRR, warm extremities.   Chest: CTA, no crackles/wheezing iblaterally.   Abdomen: soft, non-distended, no ttp, no rebound or guarding. No CVA tenderness bilaterally.   Extremities: + bilateral peripheral pitting edema, No calf tenderness, No leg size discrepancies  Skin: no rashes  Neuro: AAOx3, motor and sensory grossly intact.   Psych: mood and affect appropriate

## 2023-03-01 NOTE — H&P ADULT - NSHPLABSRESULTS_GEN_ALL_CORE
LABS:                        12.8   19.19 )-----------( 130      ( 01 Mar 2023 17:42 )             42.4     03-    136  |  95<L>  |  54<H>  ----------------------------<  283<H>  2.8<LL>   |  22  |  2.44<H>    Ca    8.6      01 Mar 2023 17:42  Phos  3.6     03-  Mg     1.8     -    TPro  7.6  /  Alb  3.9  /  TBili  1.2  /  DBili  x   /  AST  23  /  ALT  18  /  AlkPhos  123<H>  03-    PT/INR - ( 01 Mar 2023 17:42 )   PT: 19.7 sec;   INR: 1.69 ratio         PTT - ( 01 Mar 2023 17:42 )  PTT:33.2 sec      Urinalysis Basic - ( 01 Mar 2023 18:53 )    Color: Yellow / Appearance: Clear / S.022 / pH: x  Gluc: x / Ketone: Negative  / Bili: Small / Urobili: 3 mg/dL   Blood: x / Protein: 100 mg/dl / Nitrite: Negative   Leuk Esterase: Negative / RBC: 2 /hpf / WBC 1 /HPF   Sq Epi: x / Non Sq Epi: 0 / Bacteria: Negative

## 2023-03-01 NOTE — CONSULT NOTE ADULT - ATTENDING COMMENTS
Cardiac history of HFrEF and CAD s/p PCI  Presented with dyspnea, nausea/vomiting  Found to be hypoxic and in shock with plethoric IVC on bedside POCUS  CICU consulted for admission for cardiogenic shock  Patient was febrile with leukocytosis  Does not appear in heart failure/decompensated on exam  JVP is elevated although given his baseline TR and pulmonary hypertension this is not unexpected  I am more concerned for sepsis with the source being his lower extremity wounds  Continue Cefepime for sepsis and Levophed for shock per MICU  I would be conservative with any further fluid resuscitation  Defer CICU admission/transfer Cardiac history of HFrEF and CAD s/p PCI  Presented with dyspnea, nausea/vomiting  Found to be hypoxic and in shock with plethoric IVC on bedside POCUS  CICU consulted for admission for cardiogenic shock  Patient was febrile with leukocytosis (and he remains febrile on my exam)  Does not appear in heart failure/decompensated on exam  JVP is elevated although given his baseline TR and pulmonary hypertension this is not unexpected  I am more concerned for sepsis although I don't see a clear source  Continue Cefepime for sepsis and Levophed for shock per MICU  I would be conservative with any further fluid resuscitation  Defer CICU admission/transfer

## 2023-03-01 NOTE — ED ADULT NURSE REASSESSMENT NOTE - NS ED NURSE REASSESS COMMENT FT1
Report received from RN, Cat in CC. Upon assessment, pt AxOx3, sitting up in stretcher and having difficultly speaking in full sentences. Breathing spontaneously and tachypneic, maintained on 4L NC, continuous pulse ox, and cardiac monitor. Pt hypotensive, MD Li aware, orders to be placed. Safety and comfort measures maintained- bed in lowest position, locked, and blanket given.

## 2023-03-01 NOTE — ED ADULT NURSE NOTE - OBJECTIVE STATEMENT
Patient c/o hypotension. As per patient, he took his bp at home and saw that he was low so he called an ambulance. Patient stated that he did not have any symptoms. Patient denies chest pain, sob, weakness, n/v/d. Patient has labored breathing on 6 L NC placed by EMS but denies any difficulty breathing and states "it is always like this". Pitting edema noted bilaterally to lower extremities. Ulcer noted on right foot. Patient A&Ox3, denies any pain. Patient placed on cardiac monitor with continuous pulse ox. Plan of care in progress.

## 2023-03-01 NOTE — ED PROVIDER NOTE - PHYSICAL EXAMINATION
20-Jun-2022 GENERAL: non-toxic appearing  HEAD: normocephalic, atraumatic  HEENT: normal conjunctiva, oral mucosa moist, neck supple  CARDIAC: regular rate and rhythm  PULM: Coarse bilateral breath sounds  GI: abdomen nondistended, soft, nontender, no guarding or rebound tenderness  : no CVA tenderness, no suprapubic tenderness  NEURO: alert and oriented x 3, normal speech  MSK: no visible deformities, no peripheral edema, calf tenderness/redness/swelling  SKIN: no visible rashes, dry, well-perfused  PSYCH: appropriate mood and affect

## 2023-03-01 NOTE — ED PROVIDER NOTE - OBJECTIVE STATEMENT
62-year-old male with a past medical history of HFrEF with an EF of 19% status post AICD, CAD status post stents, DM 2, HTN, COPD with a 30+ pack year history of smoking presents to the ED with shortness of breath.  Patient endorses chronic cough feeling more fatigued than usual.  Sent in by PMD for concerns for sepsis.  Patient denies any lower extremity swelling or orthopnea.  As per EMS, patient was noted to be hypoxic to 85% on room air started on nasal cannula.  Noted to be hypotensive in the field and immediately brought to the ED for further evaluation.  Patient denies any chest pain, abdominal pain, headaches, focal neurologic deficits, numbness, or tingling.  He denies any other symptoms at bedside.

## 2023-03-01 NOTE — CONSULT NOTE ADULT - SUBJECTIVE AND OBJECTIVE BOX
Cardiology Consult Note   [Please check [email or web address] password: [email or web address]" for cardiology service schedule and contact information]    HPI:  62M w/ hx of HTN, DMII, CAD (s/p stents), HFrEF (EF 19%, s/p ICD), COPD, presenting with shortness of breath.    Pt follows with private cardiologist Dr. Camara. Pt reports on day of presentation in afternoon started to feel short of breath and generalized weakness. Called EMS who evaluated pt and found him to be hypoxic to mid 80's on RA that improved with NC, as well as hypotensive. Pt brought to ED for further evaluation.    In ED pt found to be febrile to 38.2C, with WBC 19K, lactate 2.8, Cr 2.44. CCU consulted for evaluation for CCU admission.      PAST MEDICAL & SURGICAL HISTORY:  Stented coronary artery      Diabetes      AICD (automatic cardioverter/defibrillator) present      Hypertension      Heart failure with reduced ejection fraction      History of ischemic cardiomyopathy      History of COPD      H/O gastroesophageal reflux (GERD)      H/O vasectomy  20 yrs ago (2000)        FAMILY HISTORY:  Family history of COPD (chronic obstructive pulmonary disease) (Sibling)    Family history of cardiac disorder  Paternal      SOCIAL HISTORY:  unchanged    MEDICATIONS:  norepinephrine Infusion 0.037 MICROgram(s)/kG/Min IV Continuous <Continuous>              potassium chloride    Tablet ER 40 milliEquivalent(s) Oral every 2 hours  potassium chloride  10 mEq/100 mL IVPB 10 milliEquivalent(s) IV Intermittent every 1 hour      REVIEW OF SYSTEMS:  CV: chest pain (-), palpitation (-), orthopnea (-), PND (-), edema (-)  PULM: SOB (+), cough (-), wheezing (-), hemoptysis (-)  CONST: fever (+), chills (-) or fatigue (+)  GI: abdominal distension (-), abdominal pain (-) , nausea/vomiting (-), hematemesis, (-), melena (-), hematochezia (-)  : dysuria (-), frequency (-), hematuria (-).   NEURO: numbness (-), weakness (-), dizziness (-)  SKIN: itching (-), rash (-)  HEENT:  visual changes (-); vertigo or throat pain (-);  neck stiffness (-)     All other review of systems is negative unless indicated above.   -------------------------------------------------------------------------------------------  PHYSICAL EXAM:  T(C): 38.2 (03-01-23 @ 18:26), Max: 38.2 (03-01-23 @ 18:26)  HR: 98 (03-01-23 @ 21:57) (92 - 107)  BP: 94/62 (03-01-23 @ 21:57) (73/57 - 94/62)  RR: 26 (03-01-23 @ 21:57) (22 - 28)  SpO2: 97% (03-01-23 @ 21:57) (95% - 97%)  Wt(kg): --  I&O's Summary      GENERAL: NAD, appears fatigued  HEAD: Atraumatic, Normocephalic.  EYES: EOMI, PERRLA, conjunctiva and sclera clear.  ENT: Moist mucous membranes.  NECK: Supple, No JVD.  CHEST/LUNG: Clear to auscultation bilaterally; No rales, rhonchi, wheezing, or rubs. Unlabored respirations.  HEART: Regular rate and rhythm; No murmurs, rubs, or gallops.  ABDOMEN: Bowel sounds present; Soft, Nontender, Nondistended.   EXTREMITIES:  warm extremities, no edema b/l  PSYCH: Normal affect.  SKIN: No rashes or lesions.    -------------------------------------------------------------------------------------------  LABS:                          12.8   19.19 )-----------( 130      ( 01 Mar 2023 17:42 )             42.4     03-01    136  |  95<L>  |  54<H>  ----------------------------<  283<H>  2.8<LL>   |  22  |  2.44<H>    Ca    8.6      01 Mar 2023 17:42  Phos  3.6     03-01  Mg     1.8     03-01    TPro  7.6  /  Alb  3.9  /  TBili  1.2  /  DBili  x   /  AST  23  /  ALT  18  /  AlkPhos  123<H>  03-01    PT/INR - ( 01 Mar 2023 17:42 )   PT: 19.7 sec;   INR: 1.69 ratio         PTT - ( 01 Mar 2023 17:42 )  PTT:33.2 sec  CARDIAC MARKERS ( 01 Mar 2023 17:42 )  101 ng/L / x     / x     / x     / x     / x        EKG:   3/1/23: sinus tachycardia (), PVC, LAFB, Q waves in inferior leads    -------------------------------------------------------------------------------------------                 Cardiology Consult Note   [Please check [email or web address] password: [email or web address]" for cardiology service schedule and contact information]    HPI:  62M w/ hx of HTN, DMII, CAD (s/p stents), HFrEF (EF 19%, s/p ICD), COPD, presenting with shortness of breath.    Pt follows with private cardiologist Dr. Camara. Pt reports on day of presentation in afternoon started to feel short of breath and generalized weakness. Called EMS who evaluated pt and found him to be hypoxic to mid 80's on RA that improved with NC, as well as hypotensive. Pt brought to ED for further evaluation.    In ED pt found to be febrile to 38.2C, with WBC 19K, lactate 2.8, Cr 2.44. CCU consulted for evaluation for CCU admission.      PAST MEDICAL & SURGICAL HISTORY:  Stented coronary artery      Diabetes      AICD (automatic cardioverter/defibrillator) present      Hypertension      Heart failure with reduced ejection fraction      History of ischemic cardiomyopathy      History of COPD      H/O gastroesophageal reflux (GERD)      H/O vasectomy  20 yrs ago (2000)        FAMILY HISTORY:  Family history of COPD (chronic obstructive pulmonary disease) (Sibling)    Family history of cardiac disorder  Paternal      SOCIAL HISTORY:  unchanged    MEDICATIONS:  norepinephrine Infusion 0.037 MICROgram(s)/kG/Min IV Continuous <Continuous>              potassium chloride    Tablet ER 40 milliEquivalent(s) Oral every 2 hours  potassium chloride  10 mEq/100 mL IVPB 10 milliEquivalent(s) IV Intermittent every 1 hour      REVIEW OF SYSTEMS:  CV: chest pain (-), palpitation (-), orthopnea (-), PND (-), edema (-)  PULM: SOB (+), cough (-), wheezing (-), hemoptysis (-)  CONST: fever (+), chills (-) or fatigue (+)  GI: abdominal distension (-), abdominal pain (-) , nausea/vomiting (-), hematemesis, (-), melena (-), hematochezia (-)  : dysuria (-), frequency (-), hematuria (-).   NEURO: numbness (-), weakness (-), dizziness (-)  SKIN: itching (-), rash (-)  HEENT:  visual changes (-); vertigo or throat pain (-);  neck stiffness (-)     All other review of systems is negative unless indicated above.   -------------------------------------------------------------------------------------------  PHYSICAL EXAM:  T(C): 38.2 (03-01-23 @ 18:26), Max: 38.2 (03-01-23 @ 18:26)  HR: 98 (03-01-23 @ 21:57) (92 - 107)  BP: 94/62 (03-01-23 @ 21:57) (73/57 - 94/62)  RR: 26 (03-01-23 @ 21:57) (22 - 28)  SpO2: 97% (03-01-23 @ 21:57) (95% - 97%)  Wt(kg): --  I&O's Summary      GENERAL: NAD, appears fatigued  HEAD: Atraumatic, Normocephalic.  EYES: EOMI, PERRLA, conjunctiva and sclera clear.  ENT: Moist mucous membranes.  NECK: Supple, No JVD.  CHEST/LUNG: Clear to auscultation bilaterally; No rales, rhonchi, wheezing, or rubs. Unlabored respirations.  HEART: Regular rate and rhythm; No murmurs, rubs, or gallops.  ABDOMEN: Bowel sounds present; Soft, Nontender, Nondistended.   EXTREMITIES:  warm extremities, no edema b/l  PSYCH: Normal affect.  SKIN: No rashes or lesions.    -------------------------------------------------------------------------------------------  LABS:                          12.8   19.19 )-----------( 130      ( 01 Mar 2023 17:42 )             42.4     03-01    136  |  95<L>  |  54<H>  ----------------------------<  283<H>  2.8<LL>   |  22  |  2.44<H>    Ca    8.6      01 Mar 2023 17:42  Phos  3.6     03-01  Mg     1.8     03-01    TPro  7.6  /  Alb  3.9  /  TBili  1.2  /  DBili  x   /  AST  23  /  ALT  18  /  AlkPhos  123<H>  03-01    PT/INR - ( 01 Mar 2023 17:42 )   PT: 19.7 sec;   INR: 1.69 ratio         PTT - ( 01 Mar 2023 17:42 )  PTT:33.2 sec  CARDIAC MARKERS ( 01 Mar 2023 17:42 )  101 ng/L / x     / x     / x     / x     / x        EKG:   3/1/23: sinus tachycardia (), PVC, LAFB, Q waves in inferior leads, poor R wave progression  12/12/22: NSR, LAFB, Q waves in inferior leads, poor R wave progression    -------------------------------------------------------------------------------------------

## 2023-03-01 NOTE — ED PROVIDER NOTE - ATTENDING CONTRIBUTION TO CARE
See MDM above.  cardiology and micu consulted for CCU vs MICU admission respectively, due to leukocytosis and fever patient should likely end up with MICU and cardiology following.  Awaiting MICU recommendations and will disposition to clinically to the inpatient team.

## 2023-03-02 LAB
A1C WITH ESTIMATED AVERAGE GLUCOSE RESULT: 7.4 % — HIGH (ref 4–5.6)
ALBUMIN SERPL ELPH-MCNC: 3.7 G/DL — SIGNIFICANT CHANGE UP (ref 3.3–5)
ALBUMIN SERPL ELPH-MCNC: 3.8 G/DL — SIGNIFICANT CHANGE UP (ref 3.3–5)
ALP SERPL-CCNC: 118 U/L — SIGNIFICANT CHANGE UP (ref 40–120)
ALP SERPL-CCNC: 120 U/L — SIGNIFICANT CHANGE UP (ref 40–120)
ALT FLD-CCNC: 28 U/L — SIGNIFICANT CHANGE UP (ref 10–45)
ALT FLD-CCNC: 41 U/L — SIGNIFICANT CHANGE UP (ref 10–45)
ANION GAP SERPL CALC-SCNC: 17 MMOL/L — SIGNIFICANT CHANGE UP (ref 5–17)
ANION GAP SERPL CALC-SCNC: 18 MMOL/L — HIGH (ref 5–17)
APTT BLD: 35.4 SEC — SIGNIFICANT CHANGE UP (ref 27.5–35.5)
AST SERPL-CCNC: 35 U/L — SIGNIFICANT CHANGE UP (ref 10–40)
AST SERPL-CCNC: 52 U/L — HIGH (ref 10–40)
BASE EXCESS BLDV CALC-SCNC: -3.7 MMOL/L — LOW (ref -2–3)
BASE EXCESS BLDV CALC-SCNC: -5.2 MMOL/L — LOW (ref -2–3)
BASOPHILS # BLD AUTO: 0.04 K/UL — SIGNIFICANT CHANGE UP (ref 0–0.2)
BASOPHILS NFR BLD AUTO: 0.2 % — SIGNIFICANT CHANGE UP (ref 0–2)
BILIRUB SERPL-MCNC: 1.3 MG/DL — HIGH (ref 0.2–1.2)
BILIRUB SERPL-MCNC: 1.3 MG/DL — HIGH (ref 0.2–1.2)
BUN SERPL-MCNC: 60 MG/DL — HIGH (ref 7–23)
BUN SERPL-MCNC: 65 MG/DL — HIGH (ref 7–23)
CA-I SERPL-SCNC: 1.03 MMOL/L — LOW (ref 1.15–1.33)
CA-I SERPL-SCNC: 1.07 MMOL/L — LOW (ref 1.15–1.33)
CALCIUM SERPL-MCNC: 7.8 MG/DL — LOW (ref 8.4–10.5)
CALCIUM SERPL-MCNC: 8.3 MG/DL — LOW (ref 8.4–10.5)
CHLORIDE BLDV-SCNC: 96 MMOL/L — SIGNIFICANT CHANGE UP (ref 96–108)
CHLORIDE BLDV-SCNC: 97 MMOL/L — SIGNIFICANT CHANGE UP (ref 96–108)
CHLORIDE SERPL-SCNC: 95 MMOL/L — LOW (ref 96–108)
CHLORIDE SERPL-SCNC: 97 MMOL/L — SIGNIFICANT CHANGE UP (ref 96–108)
CO2 BLDV-SCNC: 23 MMOL/L — SIGNIFICANT CHANGE UP (ref 22–26)
CO2 BLDV-SCNC: 25 MMOL/L — SIGNIFICANT CHANGE UP (ref 22–26)
CO2 SERPL-SCNC: 18 MMOL/L — LOW (ref 22–31)
CO2 SERPL-SCNC: 19 MMOL/L — LOW (ref 22–31)
CREAT SERPL-MCNC: 3.01 MG/DL — HIGH (ref 0.5–1.3)
CREAT SERPL-MCNC: 3.33 MG/DL — HIGH (ref 0.5–1.3)
CULTURE RESULTS: SIGNIFICANT CHANGE UP
EGFR: 20 ML/MIN/1.73M2 — LOW
EGFR: 23 ML/MIN/1.73M2 — LOW
EOSINOPHIL # BLD AUTO: 0.03 K/UL — SIGNIFICANT CHANGE UP (ref 0–0.5)
EOSINOPHIL NFR BLD AUTO: 0.2 % — SIGNIFICANT CHANGE UP (ref 0–6)
ESTIMATED AVERAGE GLUCOSE: 166 MG/DL — HIGH (ref 68–114)
FERRITIN SERPL-MCNC: 225 NG/ML — SIGNIFICANT CHANGE UP (ref 30–400)
FOLATE SERPL-MCNC: 17.4 NG/ML — SIGNIFICANT CHANGE UP
GAS PNL BLDV: 129 MMOL/L — LOW (ref 136–145)
GAS PNL BLDV: 130 MMOL/L — LOW (ref 136–145)
GAS PNL BLDV: SIGNIFICANT CHANGE UP
GLUCOSE BLDC GLUCOMTR-MCNC: 178 MG/DL — HIGH (ref 70–99)
GLUCOSE BLDC GLUCOMTR-MCNC: 202 MG/DL — HIGH (ref 70–99)
GLUCOSE BLDC GLUCOMTR-MCNC: 265 MG/DL — HIGH (ref 70–99)
GLUCOSE BLDC GLUCOMTR-MCNC: 265 MG/DL — HIGH (ref 70–99)
GLUCOSE BLDV-MCNC: 306 MG/DL — HIGH (ref 70–99)
GLUCOSE BLDV-MCNC: 318 MG/DL — HIGH (ref 70–99)
GLUCOSE SERPL-MCNC: 287 MG/DL — HIGH (ref 70–99)
GLUCOSE SERPL-MCNC: 294 MG/DL — HIGH (ref 70–99)
HCO3 BLDV-SCNC: 22 MMOL/L — SIGNIFICANT CHANGE UP (ref 22–29)
HCO3 BLDV-SCNC: 23 MMOL/L — SIGNIFICANT CHANGE UP (ref 22–29)
HCT VFR BLD CALC: 41.9 % — SIGNIFICANT CHANGE UP (ref 39–50)
HCT VFR BLDA CALC: 40 % — SIGNIFICANT CHANGE UP (ref 39–51)
HCT VFR BLDA CALC: 41 % — SIGNIFICANT CHANGE UP (ref 39–51)
HGB BLD CALC-MCNC: 13.2 G/DL — SIGNIFICANT CHANGE UP (ref 12.6–17.4)
HGB BLD CALC-MCNC: 13.5 G/DL — SIGNIFICANT CHANGE UP (ref 12.6–17.4)
HGB BLD-MCNC: 12.7 G/DL — LOW (ref 13–17)
HOROWITZ INDEX BLDV+IHG-RTO: 36 — SIGNIFICANT CHANGE UP
IMM GRANULOCYTES NFR BLD AUTO: 3.4 % — HIGH (ref 0–0.9)
INR BLD: 1.88 RATIO — HIGH (ref 0.88–1.16)
IRON SATN MFR SERPL: 14 UG/DL — LOW (ref 45–165)
LACTATE BLDV-MCNC: 2.3 MMOL/L — HIGH (ref 0.5–2)
LACTATE BLDV-MCNC: 2.3 MMOL/L — HIGH (ref 0.5–2)
LYMPHOCYTES # BLD AUTO: 0.25 K/UL — LOW (ref 1–3.3)
LYMPHOCYTES # BLD AUTO: 1.5 % — LOW (ref 13–44)
MAGNESIUM SERPL-MCNC: 1.8 MG/DL — SIGNIFICANT CHANGE UP (ref 1.6–2.6)
MAGNESIUM SERPL-MCNC: 2.1 MG/DL — SIGNIFICANT CHANGE UP (ref 1.6–2.6)
MCHC RBC-ENTMCNC: 23.9 PG — LOW (ref 27–34)
MCHC RBC-ENTMCNC: 30.3 GM/DL — LOW (ref 32–36)
MCV RBC AUTO: 78.8 FL — LOW (ref 80–100)
MONOCYTES # BLD AUTO: 0.58 K/UL — SIGNIFICANT CHANGE UP (ref 0–0.9)
MONOCYTES NFR BLD AUTO: 3.4 % — SIGNIFICANT CHANGE UP (ref 2–14)
MRSA PCR RESULT.: SIGNIFICANT CHANGE UP
NEUTROPHILS # BLD AUTO: 15.44 K/UL — HIGH (ref 1.8–7.4)
NEUTROPHILS NFR BLD AUTO: 91.3 % — HIGH (ref 43–77)
NRBC # BLD: 0 /100 WBCS — SIGNIFICANT CHANGE UP (ref 0–0)
PCO2 BLDV: 42 MMHG — SIGNIFICANT CHANGE UP (ref 42–55)
PCO2 BLDV: 57 MMHG — HIGH (ref 42–55)
PH BLDV: 7.22 — LOW (ref 7.32–7.43)
PH BLDV: 7.33 — SIGNIFICANT CHANGE UP (ref 7.32–7.43)
PHOSPHATE SERPL-MCNC: 5 MG/DL — HIGH (ref 2.5–4.5)
PHOSPHATE SERPL-MCNC: 5.3 MG/DL — HIGH (ref 2.5–4.5)
PLATELET # BLD AUTO: 129 K/UL — LOW (ref 150–400)
PO2 BLDV: 27 MMHG — SIGNIFICANT CHANGE UP (ref 25–45)
PO2 BLDV: 35 MMHG — SIGNIFICANT CHANGE UP (ref 25–45)
POTASSIUM BLDV-SCNC: 3.7 MMOL/L — SIGNIFICANT CHANGE UP (ref 3.5–5.1)
POTASSIUM BLDV-SCNC: 3.9 MMOL/L — SIGNIFICANT CHANGE UP (ref 3.5–5.1)
POTASSIUM SERPL-MCNC: 3.7 MMOL/L — SIGNIFICANT CHANGE UP (ref 3.5–5.3)
POTASSIUM SERPL-MCNC: 3.7 MMOL/L — SIGNIFICANT CHANGE UP (ref 3.5–5.3)
POTASSIUM SERPL-SCNC: 3.7 MMOL/L — SIGNIFICANT CHANGE UP (ref 3.5–5.3)
POTASSIUM SERPL-SCNC: 3.7 MMOL/L — SIGNIFICANT CHANGE UP (ref 3.5–5.3)
PROCALCITONIN SERPL-MCNC: 10.24 NG/ML — HIGH (ref 0.02–0.1)
PROT SERPL-MCNC: 7.3 G/DL — SIGNIFICANT CHANGE UP (ref 6–8.3)
PROT SERPL-MCNC: 7.8 G/DL — SIGNIFICANT CHANGE UP (ref 6–8.3)
PROTHROM AB SERPL-ACNC: 21.9 SEC — HIGH (ref 10.5–13.4)
RBC # BLD: 5.32 M/UL — SIGNIFICANT CHANGE UP (ref 4.2–5.8)
RBC # FLD: 20.5 % — HIGH (ref 10.3–14.5)
S AUREUS DNA NOSE QL NAA+PROBE: DETECTED
SAO2 % BLDV: 27.9 % — LOW (ref 67–88)
SAO2 % BLDV: 62.3 % — LOW (ref 67–88)
SODIUM SERPL-SCNC: 131 MMOL/L — LOW (ref 135–145)
SODIUM SERPL-SCNC: 133 MMOL/L — LOW (ref 135–145)
SPECIMEN SOURCE: SIGNIFICANT CHANGE UP
VIT B12 SERPL-MCNC: 611 PG/ML — SIGNIFICANT CHANGE UP (ref 232–1245)
WBC # BLD: 16.91 K/UL — HIGH (ref 3.8–10.5)
WBC # FLD AUTO: 16.91 K/UL — HIGH (ref 3.8–10.5)

## 2023-03-02 PROCEDURE — 93306 TTE W/DOPPLER COMPLETE: CPT | Mod: 26

## 2023-03-02 PROCEDURE — 74176 CT ABD & PELVIS W/O CONTRAST: CPT | Mod: 26

## 2023-03-02 PROCEDURE — 99291 CRITICAL CARE FIRST HOUR: CPT

## 2023-03-02 RX ORDER — ALBUTEROL 90 UG/1
2 AEROSOL, METERED ORAL THREE TIMES A DAY
Refills: 0 | Status: DISCONTINUED | OUTPATIENT
Start: 2023-03-02 | End: 2023-03-11

## 2023-03-02 RX ORDER — DEXTROSE 50 % IN WATER 50 %
25 SYRINGE (ML) INTRAVENOUS
Refills: 0 | Status: DISCONTINUED | OUTPATIENT
Start: 2023-03-02 | End: 2023-03-11

## 2023-03-02 RX ORDER — ONDANSETRON 8 MG/1
4 TABLET, FILM COATED ORAL ONCE
Refills: 0 | Status: COMPLETED | OUTPATIENT
Start: 2023-03-02 | End: 2023-03-03

## 2023-03-02 RX ORDER — TAMSULOSIN HYDROCHLORIDE 0.4 MG/1
0.4 CAPSULE ORAL AT BEDTIME
Refills: 0 | Status: DISCONTINUED | OUTPATIENT
Start: 2023-03-02 | End: 2023-03-11

## 2023-03-02 RX ORDER — ASPIRIN/CALCIUM CARB/MAGNESIUM 324 MG
81 TABLET ORAL DAILY
Refills: 0 | Status: DISCONTINUED | OUTPATIENT
Start: 2023-03-02 | End: 2023-03-11

## 2023-03-02 RX ORDER — ATORVASTATIN CALCIUM 80 MG/1
80 TABLET, FILM COATED ORAL AT BEDTIME
Refills: 0 | Status: DISCONTINUED | OUTPATIENT
Start: 2023-03-02 | End: 2023-03-11

## 2023-03-02 RX ORDER — ACETAMINOPHEN 500 MG
650 TABLET ORAL EVERY 6 HOURS
Refills: 0 | Status: DISCONTINUED | OUTPATIENT
Start: 2023-03-02 | End: 2023-03-11

## 2023-03-02 RX ORDER — ENOXAPARIN SODIUM 100 MG/ML
40 INJECTION SUBCUTANEOUS EVERY 24 HOURS
Refills: 0 | Status: DISCONTINUED | OUTPATIENT
Start: 2023-03-02 | End: 2023-03-02

## 2023-03-02 RX ORDER — ACETAMINOPHEN 500 MG
1000 TABLET ORAL ONCE
Refills: 0 | Status: COMPLETED | OUTPATIENT
Start: 2023-03-02 | End: 2023-03-02

## 2023-03-02 RX ORDER — CLOPIDOGREL BISULFATE 75 MG/1
75 TABLET, FILM COATED ORAL DAILY
Refills: 0 | Status: DISCONTINUED | OUTPATIENT
Start: 2023-03-02 | End: 2023-03-11

## 2023-03-02 RX ORDER — ALBUMIN HUMAN 25 %
100 VIAL (ML) INTRAVENOUS ONCE
Refills: 0 | Status: COMPLETED | OUTPATIENT
Start: 2023-03-02 | End: 2023-03-02

## 2023-03-02 RX ORDER — SODIUM CHLORIDE 9 MG/ML
250 INJECTION, SOLUTION INTRAVENOUS ONCE
Refills: 0 | Status: COMPLETED | OUTPATIENT
Start: 2023-03-02 | End: 2023-03-02

## 2023-03-02 RX ORDER — INSULIN GLARGINE 100 [IU]/ML
20 INJECTION, SOLUTION SUBCUTANEOUS AT BEDTIME
Refills: 0 | Status: DISCONTINUED | OUTPATIENT
Start: 2023-03-02 | End: 2023-03-02

## 2023-03-02 RX ORDER — INSULIN LISPRO 100/ML
VIAL (ML) SUBCUTANEOUS AT BEDTIME
Refills: 0 | Status: DISCONTINUED | OUTPATIENT
Start: 2023-03-02 | End: 2023-03-02

## 2023-03-02 RX ORDER — HEPARIN SODIUM 5000 [USP'U]/ML
5000 INJECTION INTRAVENOUS; SUBCUTANEOUS EVERY 8 HOURS
Refills: 0 | Status: DISCONTINUED | OUTPATIENT
Start: 2023-03-02 | End: 2023-03-11

## 2023-03-02 RX ORDER — INSULIN GLARGINE 100 [IU]/ML
30 INJECTION, SOLUTION SUBCUTANEOUS AT BEDTIME
Refills: 0 | Status: DISCONTINUED | OUTPATIENT
Start: 2023-03-02 | End: 2023-03-03

## 2023-03-02 RX ORDER — INSULIN LISPRO 100/ML
10 VIAL (ML) SUBCUTANEOUS
Qty: 0 | Refills: 0 | DISCHARGE

## 2023-03-02 RX ORDER — PANTOPRAZOLE SODIUM 20 MG/1
40 TABLET, DELAYED RELEASE ORAL
Refills: 0 | Status: DISCONTINUED | OUTPATIENT
Start: 2023-03-02 | End: 2023-03-11

## 2023-03-02 RX ORDER — ALPRAZOLAM 0.25 MG
1 TABLET ORAL
Qty: 0 | Refills: 0 | DISCHARGE

## 2023-03-02 RX ORDER — INSULIN LISPRO 100/ML
VIAL (ML) SUBCUTANEOUS
Refills: 0 | Status: DISCONTINUED | OUTPATIENT
Start: 2023-03-02 | End: 2023-03-11

## 2023-03-02 RX ORDER — INSULIN GLARGINE 100 [IU]/ML
10 INJECTION, SOLUTION SUBCUTANEOUS ONCE
Refills: 0 | Status: COMPLETED | OUTPATIENT
Start: 2023-03-02 | End: 2023-03-02

## 2023-03-02 RX ORDER — NICOTINE POLACRILEX 2 MG
1 GUM BUCCAL
Qty: 0 | Refills: 0 | DISCHARGE

## 2023-03-02 RX ADMIN — SODIUM CHLORIDE 500 MILLILITER(S): 9 INJECTION, SOLUTION INTRAVENOUS at 01:50

## 2023-03-02 RX ADMIN — Medication 50 MILLILITER(S): at 10:23

## 2023-03-02 RX ADMIN — Medication 2: at 21:03

## 2023-03-02 RX ADMIN — PANTOPRAZOLE SODIUM 40 MILLIGRAM(S): 20 TABLET, DELAYED RELEASE ORAL at 08:14

## 2023-03-02 RX ADMIN — Medication 4: at 17:29

## 2023-03-02 RX ADMIN — Medication 7 MICROGRAM(S)/KG/MIN: at 08:05

## 2023-03-02 RX ADMIN — ATORVASTATIN CALCIUM 80 MILLIGRAM(S): 80 TABLET, FILM COATED ORAL at 21:03

## 2023-03-02 RX ADMIN — TAMSULOSIN HYDROCHLORIDE 0.4 MILLIGRAM(S): 0.4 CAPSULE ORAL at 21:03

## 2023-03-02 RX ADMIN — Medication 6: at 11:24

## 2023-03-02 RX ADMIN — Medication 400 MILLIGRAM(S): at 08:23

## 2023-03-02 RX ADMIN — ENOXAPARIN SODIUM 40 MILLIGRAM(S): 100 INJECTION SUBCUTANEOUS at 05:26

## 2023-03-02 RX ADMIN — CLOPIDOGREL BISULFATE 75 MILLIGRAM(S): 75 TABLET, FILM COATED ORAL at 11:17

## 2023-03-02 RX ADMIN — Medication 81 MILLIGRAM(S): at 11:17

## 2023-03-02 RX ADMIN — Medication 1000 MILLIGRAM(S): at 16:00

## 2023-03-02 RX ADMIN — CEFEPIME 100 MILLIGRAM(S): 1 INJECTION, POWDER, FOR SOLUTION INTRAMUSCULAR; INTRAVENOUS at 05:24

## 2023-03-02 RX ADMIN — Medication 650 MILLIGRAM(S): at 21:28

## 2023-03-02 RX ADMIN — Medication 650 MILLIGRAM(S): at 21:58

## 2023-03-02 RX ADMIN — Medication 100 MILLIEQUIVALENT(S): at 00:20

## 2023-03-02 RX ADMIN — HEPARIN SODIUM 5000 UNIT(S): 5000 INJECTION INTRAVENOUS; SUBCUTANEOUS at 23:15

## 2023-03-02 RX ADMIN — CEFEPIME 100 MILLIGRAM(S): 1 INJECTION, POWDER, FOR SOLUTION INTRAMUSCULAR; INTRAVENOUS at 17:28

## 2023-03-02 RX ADMIN — HEPARIN SODIUM 5000 UNIT(S): 5000 INJECTION INTRAVENOUS; SUBCUTANEOUS at 08:14

## 2023-03-02 RX ADMIN — Medication 1000 MILLIGRAM(S): at 09:00

## 2023-03-02 RX ADMIN — Medication 400 MILLIGRAM(S): at 14:45

## 2023-03-02 RX ADMIN — HEPARIN SODIUM 5000 UNIT(S): 5000 INJECTION INTRAVENOUS; SUBCUTANEOUS at 17:29

## 2023-03-02 RX ADMIN — Medication 6: at 01:05

## 2023-03-02 RX ADMIN — INSULIN GLARGINE 10 UNIT(S): 100 INJECTION, SOLUTION SUBCUTANEOUS at 11:25

## 2023-03-02 RX ADMIN — Medication 1000 MILLIGRAM(S): at 01:25

## 2023-03-02 RX ADMIN — CHLORHEXIDINE GLUCONATE 1 APPLICATION(S): 213 SOLUTION TOPICAL at 05:25

## 2023-03-02 RX ADMIN — INSULIN GLARGINE 30 UNIT(S): 100 INJECTION, SOLUTION SUBCUTANEOUS at 21:02

## 2023-03-02 RX ADMIN — Medication 400 MILLIGRAM(S): at 00:55

## 2023-03-02 RX ADMIN — Medication 6: at 05:25

## 2023-03-02 NOTE — CONSULT NOTE ADULT - SUBJECTIVE AND OBJECTIVE BOX
[facility]  Lul Cruz  Office: [phone]  Cell: [phone]  Can also be reached on [employer] Teams     Patient is being followed by me in the outpatient setting. Last admission to hospital in December 2022 for hypovolemia in the setting of extra renal losses diarrhea, in the setting of severe CHF. In the past 3 months, I have been gradually increasing his GDMT to prior levels. Was previously following [facility] Heart Failure, however due to disagreement with cardiomems decided to not follow up(I have discussed the importance of the necessity of having cardiology on board even if they do not agree with cardiomems). His blood pressure was being monitored at home with gradually increase in diuretic dosing and increase in GDMT dosing. He was being seen on a monthly basis., His dry weight was established to be ~245 pounds. Last seen was on BUmex 2mg BID, Entresto, spironolactone, 100mg metoprolol. I had increased his diuretic dose to 3mg BID due to some increase LE edema 3 weeks prior. Patient with episodes of noncompliance- does not always watch diet, occasional smoking. Last week he injured his leg and developed an ulcer. Went to wound care this past Monday who put antibacterial ointment. Yesterday, patient;s wife, Rhonda reached out to me due to chills and elevated temperature in the morning. His wound was surrounded by erythema in which i asked them to kwan so it could be monitored. Blood pressure was stable yesterday morning with SBP ~110s, however heart rate in the 110s. I started Augmentin with plan to follow up today, however patient had persistent nausea and vomiting and did not tolerate. In the afternoon, patient had altered mental status, increasing fatigue, and his blood pressure dropped precipitously. I then recommended that they go to the emergency room.    ***Incomplete*** [facility]  Lul Cruz  Office: [phone]  Cell: [phone]  Can also be reached on [employer] Teams     HPI: Patient is being followed by me in the outpatient setting. Last admission to hospital in December 2022 for hypovolemia in the setting of extra renal losses diarrhea, in the setting of severe CHF. In the past 3 months, I have been gradually increasing his GDMT to prior levels. Was previously following [facility] Heart Failure, however due to disagreement with cardiomems decided to not follow up(I have discussed the importance of the necessity of having cardiology on board even if they do not agree with cardiomems). His blood pressure was being monitored at home with gradually increase in diuretic dosing and increase in GDMT dosing. He was being seen on a monthly basis., His dry weight was established to be ~245 pounds. Last seen was on BUmex 2mg BID, Entresto, spironolactone, 100mg metoprolol. I had increased his diuretic dose to 3mg BID due to some increase LE edema 3 weeks prior. Patient with episodes of noncompliance- does not always watch diet, occasional smoking. Last week he injured his leg and developed an ulcer. Went to wound care this past Monday who put antibacterial ointment. Yesterday, patient;s wife, Rhonda reached out to me due to chills and elevated temperature in the morning. His wound was surrounded by erythema in which i asked them to kwan so it could be monitored. Blood pressure was stable yesterday morning with SBP ~110s, however heart rate in the 110s. I started Augmentin with plan to follow up today, however patient had persistent nausea and vomiting and did not tolerate. In the afternoon, patient had altered mental status, increasing fatigue, and his blood pressure dropped precipitously. I then recommended that they go to the emergency room.    Patient seen and examined in the afternoon. He is not at his baseline mental status, somewhat confused. As per his wife, last night was asking for his mother. Answering questions adequately but not at baseline mental status. He is able to deny any pain, shortness of breath or chest pain. Still having fevers, some chills. Not in any pain.         REVIEW OF SYSTEMS:  Negative except per HPI    Vital Signs Last 24 Hrs  T(C): 39.2 (02 Mar 2023 14:00), Max: 40.2 (02 Mar 2023 00:40)  T(F): 102.6 (02 Mar 2023 14:00), Max: 104.4 (02 Mar 2023 00:40)  HR: 101 (02 Mar 2023 14:45) (90 - 109)  BP: 104/62 (02 Mar 2023 14:30) (73/57 - 114/71)  BP(mean): 78 (02 Mar 2023 14:30) (56 - 88)  RR: 20 (02 Mar 2023 14:45) (10 - 32)  SpO2: 97% (02 Mar 2023 14:45) (94% - 100%)    Parameters below as of 02 Mar 2023 08:00  Patient On (Oxygen Delivery Method): nasal cannula  O2 Flow (L/min): 4      PHYSICAL EXAMINATION:  GENERAL: NAD, AAOx4 to person place time situation, albeit somewhat confused, slow to respond  HEAD:  Atraumatic, Normocephalic  EYES:  conjunctiva and sclera clear  NECK: Supple, No JVD, Normal thyroid  CHEST/LUNG: Clear to auscultation. no wheezing, mild rhonchi  HEART: Regular tachycardia  ABDOMEN: Soft, Nontender, Ndistended, bowel sounds present  NERVOUS SYSTEM:  Alert & Oriented X3,  moving extremities spontaneously, no focal deficits  EXTREMITIES:  2+ Peripheral Pulses, Nonpitting edema, has an ulcer on the right leg, ulcer appears to be healing, surrounding erythema improved compared to yesterday.  SKIN: warm dry                          12.7   16.91 )-----------( 129      ( 02 Mar 2023 01:04 )             41.9     03-02    131<L>  |  95<L>  |  65<H>  ----------------------------<  287<H>  3.7   |  19<L>  |  3.33<H>    Ca    8.3<L>      02 Mar 2023 06:08  Phos  5.3     03-02  Mg     2.1     03-02    TPro  7.8  /  Alb  3.8  /  TBili  1.3<H>  /  DBili  x   /  AST  52<H>  /  ALT  41  /  AlkPhos  120  03-02    LIVER FUNCTIONS - ( 02 Mar 2023 06:08 )  Alb: 3.8 g/dL / Pro: 7.8 g/dL / ALK PHOS: 120 U/L / ALT: 41 U/L / AST: 52 U/L / GGT: x               PT/INR - ( 02 Mar 2023 01:04 )   PT: 21.9 sec;   INR: 1.88 ratio         PTT - ( 02 Mar 2023 01:04 )  PTT:35.4 sec    CAPILLARY BLOOD GLUCOSE      RADIOLOGY & ADDITIONAL TESTS:                   [facility]  Lul Cruz  Office: [phone]  Cell: [phone]  Can also be reached on [employer] Teams     HPI: Patient is being followed by me in the outpatient setting. Last admission to hospital in December 2022 for hypovolemia in the setting of extra renal losses diarrhea, in the setting of severe CHF. In the past 3 months, I have been gradually increasing his GDMT to prior levels. Was previously following [facility] Heart Failure, however due to disagreement with cardiomems decided to not follow up(I have discussed the importance of the necessity of having cardiology on board even if they do not agree with cardiomems). His blood pressure was being monitored at home with gradually increase in diuretic dosing and increase in GDMT dosing. He was being seen on a monthly basis., His dry weight was established to be ~245 pounds. Last seen was on BUmex 2mg BID, Entresto, spironolactone, 100mg metoprolol. I had increased his diuretic dose to 3mg BID due to some increase LE edema 3 weeks prior. Patient with episodes of noncompliance- does not always watch diet, occasional smoking. Last week he injured his leg and developed an ulcer. Went to wound care this past Monday who put antibacterial ointment. Yesterday, patient;s wife, Rhonda reached out to me due to chills and elevated temperature in the morning. His wound was surrounded by erythema in which i asked them to kwan so it could be monitored. Blood pressure was stable yesterday morning with SBP ~110s, however heart rate in the 110s. I started Augmentin with plan to follow up today, however patient had persistent nausea and vomiting and did not tolerate. In the afternoon, patient had altered mental status, increasing fatigue, and his blood pressure dropped precipitously. I then recommended that they go to the emergency room.    Patient seen and examined in the afternoon. He is not at his baseline mental status, somewhat confused. As per his wife, last night was asking for his mother. Answering questions adequately, but somewhat slow to respond mildly lethargic. He is able to deny any pain, shortness of breath or chest pain. Still having fevers, some chills.        REVIEW OF SYSTEMS:  Negative except per HPI    Vital Signs Last 24 Hrs  T(C): 39.2 (02 Mar 2023 14:00), Max: 40.2 (02 Mar 2023 00:40)  T(F): 102.6 (02 Mar 2023 14:00), Max: 104.4 (02 Mar 2023 00:40)  HR: 101 (02 Mar 2023 14:45) (90 - 109)  BP: 104/62 (02 Mar 2023 14:30) (73/57 - 114/71)  BP(mean): 78 (02 Mar 2023 14:30) (56 - 88)  RR: 20 (02 Mar 2023 14:45) (10 - 32)  SpO2: 97% (02 Mar 2023 14:45) (94% - 100%)    Parameters below as of 02 Mar 2023 08:00  Patient On (Oxygen Delivery Method): nasal cannula  O2 Flow (L/min): 4      PHYSICAL EXAMINATION:  GENERAL: NAD, AAOx4 to person place time situation, albeit somewhat confused, slow to respond  HEAD:  Atraumatic, Normocephalic  EYES:  conjunctiva and sclera clear  NECK: Supple, No JVD, Normal thyroid  CHEST/LUNG: Clear to auscultation. no wheezing, mild rhonchi  HEART: Regular tachycardia  ABDOMEN: Soft, Nontender, Ndistended, bowel sounds present  NERVOUS SYSTEM:  Alert & Oriented X3,  moving extremities spontaneously, no focal deficits  EXTREMITIES:  2+ Peripheral Pulses, Nonpitting edema, has an ulcer on the right leg, ulcer appears to be healing, surrounding erythema improved compared to yesterday.  SKIN: warm dry                          12.7   16.91 )-----------( 129      ( 02 Mar 2023 01:04 )             41.9     03-02    131<L>  |  95<L>  |  65<H>  ----------------------------<  287<H>  3.7   |  19<L>  |  3.33<H>    Ca    8.3<L>      02 Mar 2023 06:08  Phos  5.3     03-02  Mg     2.1     03-02    TPro  7.8  /  Alb  3.8  /  TBili  1.3<H>  /  DBili  x   /  AST  52<H>  /  ALT  41  /  AlkPhos  120  03-02    LIVER FUNCTIONS - ( 02 Mar 2023 06:08 )  Alb: 3.8 g/dL / Pro: 7.8 g/dL / ALK PHOS: 120 U/L / ALT: 41 U/L / AST: 52 U/L / GGT: x               PT/INR - ( 02 Mar 2023 01:04 )   PT: 21.9 sec;   INR: 1.88 ratio         PTT - ( 02 Mar 2023 01:04 )  PTT:35.4 sec    CAPILLARY BLOOD GLUCOSE      RADIOLOGY & ADDITIONAL TESTS:

## 2023-03-02 NOTE — ED ADULT NURSE REASSESSMENT NOTE - NS ED NURSE REASSESS COMMENT FT1
Pt transported to CT w/ EDT, RN, and MD on way to transport to MICU. Pt maintained on continuous cardiac monitor, pulse ox, 4L NC.

## 2023-03-02 NOTE — CONSULT NOTE ADULT - NS ATTEND AMEND GEN_ALL_CORE FT
Seen, examined with, formulated plan with and  agree with above as scribed by NP Quin [Stevan]       MANJIT on ckd III in setting of sepsis/fevers/hypotension   pt also with oliguria when seen   has hx of cardiomyopathy with ef < 20 %   on PE appeared hypovolemic   has tachycardia therefore holding off on ionoptropes  volume resuscitate with iv alb and/or IVF  strict I/O  levophed drip   pan culture   d/w ICU team when seen earlier

## 2023-03-02 NOTE — CONSULT NOTE ADULT - SUBJECTIVE AND OBJECTIVE BOX
[facility]  [phone]  NEPHROLOGY      INITIAL CONSULT NOTE  --------------------------------------------------------------------------------  HPI:    62 year old male with pmh of HFrEF with an EF of 19% status post AICD, CAD status post stents, DM2, HTN, COPD with a 30+ pack year history of smoking presents to the ED with shortness of breath. As per EMS, patient was noted to be hypoxic to 85% on room air started on nasal cannula.  Noted to be hypotensive in the field and immediately brought to the ED for further evaluation. Endorses chornic cough and feeling more fatigued than usual. In the ED he was noted to be febrile, hypoxic, saturating in mid 90s, and hypotensive, received ivf bolus and was eventally started on pressors. WBC elevated at 19k, lactate elevated at 2.3, BNP at 6243. Had CT angio chest shows no evidence of PE detected, however showing small R pleural effusion and severe right hydronephrosis. started on Empiric Cefepime 1g and Vanc 1g. Due to worsening creatinine, renal consult called.     PAST HISTORY  --------------------------------------------------------------------------------  PAST MEDICAL & SURGICAL HISTORY:  Stented coronary artery      Diabetes      AICD (automatic cardioverter/defibrillator) present      Hypertension      Heart failure with reduced ejection fraction      History of ischemic cardiomyopathy      History of COPD      H/O gastroesophageal reflux (GERD)      H/O vasectomy  20 yrs ago (2000)        FAMILY HISTORY:  Family history of COPD (chronic obstructive pulmonary disease) (Sibling)    Family history of cardiac disorder  Paternal      PAST SOCIAL HISTORY:    ALLERGIES & MEDICATIONS  --------------------------------------------------------------------------------  Allergies    Zosyn (Pruritus)    Intolerances      Standing Inpatient Medications  aspirin  chewable 81 milliGRAM(s) Oral daily  atorvastatin 80 milliGRAM(s) Oral at bedtime  cefepime   IVPB 2000 milliGRAM(s) IV Intermittent every 12 hours  chlorhexidine 4% Liquid 1 Application(s) Topical <User Schedule>  clopidogrel Tablet 75 milliGRAM(s) Oral daily  heparin   Injectable 5000 Unit(s) SubCutaneous every 8 hours  insulin glargine Injectable (LANTUS) 20 Unit(s) SubCutaneous at bedtime  insulin lispro (ADMELOG) corrective regimen sliding scale   SubCutaneous at bedtime  insulin lispro (ADMELOG) corrective regimen sliding scale   SubCutaneous every 6 hours  norepinephrine Infusion 0.037 MICROgram(s)/kG/Min IV Continuous <Continuous>  pantoprazole    Tablet 40 milliGRAM(s) Oral before breakfast  tamsulosin 0.4 milliGRAM(s) Oral at bedtime    PRN Inpatient Medications  acetaminophen     Tablet .. 650 milliGRAM(s) Oral every 6 hours PRN  albuterol    90 MICROgram(s) HFA Inhaler 2 Puff(s) Inhalation three times a day PRN  dextrose 50% Injectable 25 Gram(s) IV Push every 15 minutes PRN      REVIEW OF SYSTEMS  --------------------------------------------------------------------------------  Gen: No  fevers/chills   Skin: No rashes  Head/Eyes/Ears/Mouth: No headache; Normal hearing;  No sinus pain/discomfort, sore throat  Respiratory: No dyspnea, cough, wheezing, hemoptysis  CV: No chest pain, orthopnea  GI: No abdominal pain, diarrhea, nausea, vomiting, melena, hematochezia  : No dysuria, decrease urination or hesitancy urinating  hematuria, nocturia  MSK: No joint pain/swelling; no back pain  Neuro: No dizziness/lightheadedness, weakness, seizures, numbness, tingling  Heme: No easy bruising or bleeding  Endo: No heat/cold intolerance  Psych: No significant nervousness, anxiety or depression  also with no edema     VITALS/PHYSICAL EXAM  --------------------------------------------------------------------------------  T(C): 39.4 (03-02-23 @ 08:00), Max: 40.2 (03-02-23 @ 00:40)  HR: 106 (03-02-23 @ 08:30) (92 - 109)  BP: 103/62 (03-02-23 @ 08:30) (73/57 - 110/69)  RR: 27 (03-02-23 @ 08:30) (14 - 32)  SpO2: 99% (03-02-23 @ 08:30) (94% - 100%)  Wt(kg): --  Height (cm): 175.3 (03-02-23 @ 00:40)  Weight (kg): 118.9 (03-02-23 @ 00:40)  BMI (kg/m2): 38.7 (03-02-23 @ 00:40)  BSA (m2): 2.32 (03-02-23 @ 00:40)      03-01-23 @ 07:01  -  03-02-23 @ 07:00  --------------------------------------------------------  IN: 705.6 mL / OUT: 50 mL / NET: 655.6 mL    03-02-23 @ 07:01  -  03-02-23 @ 09:08  --------------------------------------------------------  IN: 563.4 mL / OUT: 0 mL / NET: 563.4 mL      Physical Exam:  	Gen: Non toxic comfortable appearing   	no jvd , supple neck,   	Pulm: decrease bs  no rales or ronchi or wheezing  	CV: RRR, S1S2; no rub  	Back: No CVA tenderness; no sacral edema  	Abd: +BS, soft, nontender/nondistended  	: No suprapubic tenderness  	UE: Warm, no cyanosis  no clubbing,  no edema; no asterixis  	LE: Warm, no cyanosis  no clubbing, no edema  	Neuro: alert and oriented. speech coherent   	Psych: Normal affect and mood  	Skin: Warm, no decrease skin turgor   	Vascular access:    LABS/STUDIES  --------------------------------------------------------------------------------              12.7   16.91 >-----------<  129      [03-02-23 @ 01:04]              41.9     131  |  95  |  65  ----------------------------<  287      [03-02-23 @ 06:08]  3.7   |  19  |  3.33        Ca     8.3     [03-02-23 @ 06:08]      Mg     2.1     [03-02-23 @ 06:08]      Phos  5.3     [03-02-23 @ 06:08]    TPro  7.8  /  Alb  3.8  /  TBili  1.3  /  DBili  x   /  AST  52  /  ALT  41  /  AlkPhos  120  [03-02-23 @ 06:08]    PT/INR: PT 21.9 , INR 1.88       [03-02-23 @ 01:04]  PTT: 35.4       [03-02-23 @ 01:04]      Creatinine Trend:  SCr 3.33 [03-02 @ 06:08]  SCr 3.01 [03-02 @ 01:04]  SCr 2.44 [03-01 @ 17:42]    Urinalysis - [03-01-23 @ 18:53]      Color Yellow / Appearance Clear / SG 1.022 / pH 6.0      Gluc 500 mg/dL / Ketone Negative  / Bili Small / Urobili 3 mg/dL       Blood Negative / Protein 100 mg/dl / Leuk Est Negative / Nitrite Negative      RBC 2 / WBC 1 / Hyaline 0 / Gran  / Sq Epi  / Non Sq Epi 0 / Bacteria Negative      Iron 14, TIBC --, %sat --      [03-02-23 @ 01:20]  Ferritin 225      [03-02-23 @ 01:18]  PTH -- (Ca 8.9)      [10-02-22 @ 07:04]   73  HbA1c 8.9      [12-16-19 @ 08:15]  TSH 3.02      [09-30-22 @ 13:45]    HCV 0.09, Nonreact      [12-16-19 @ 08:36]    < from: CT Abdomen and Pelvis No Cont (03.02.23 @ 00:35) >  ACC: [id number] EXAM:  CT ABDOMEN AND PELVIS   ORDERED BY: YESY CASTELLANO     PROCEDURE DATE:  03/02/2023          INTERPRETATION:  CLINICAL INFORMATION: Right hydronephrosis seen on prior   day CT chest.    COMPARISON: CT chest 3/1/2022. CT abdomen pelvis7/2/2022    CONTRAST/COMPLICATIONS:  IV Contrast: NONE  Oral Contrast: NONE  Complications: None reported at time of study completion    PROCEDURE:  CT of the Abdomen and Pelvis was performed.  Sagittal and coronal reformats were performed.    FINDINGS:  LOWER CHEST: Cardiomegaly. Small right pleural effusion with associated   compressive atelectasis. Partially visualized left chest wall battery   pack.    Evaluation of the abdominal and pelvic viscera and vasculature is limited   without the useof IV contrast    LIVER: Hepatomegaly measuring 24 cm in the craniocaudal dimension.   Borderline steatosis.  BILE DUCTS: Normal caliber.  GALLBLADDER: Within normal limits.  SPLEEN: Mild splenomegaly.  PANCREAS: Within normal limits.  ADRENALS: Within normal limits.  KIDNEYS/URETERS: Chronic severe right hydronephrosis to the level of the   ureteropelvic junction with marked thinning of the renal cortex and   interval worsening since prior CT scan 7/2/2022. No obstructing   right-sided ureteral calculus. Delayed enhancement of the left kidney   with multiple hyperdense foci in the renal pelvis, likely retained IV   contrast from prior day CT scan. Some retained contrast is also seen   within the ureter and bladder. No left hydronephrosis. Mild nonspecific   bilateral perinephric stranding.    BLADDER: Under distended bladder with wall thickening and trabeculation   with numerous small bladder diverticula. Contrast within the bladder   lumen.  REPRODUCTIVE ORGANS: Prostate is enlarged.    BOWEL: No bowel obstruction. Scattered colonic diverticulosis without   diverticulitis. Moderate fecal load in the rectosigmoid colon.   Appendectomy.  PERITONEUM: No ascites or pneumoperitoneum.  VESSELS: Atherosclerotic calcifications of the aortoiliac tree.  RETROPERITONEUM/LYMPH NODES: Numerous prominent subcentimeter   retroperitoneal lymph nodes similar to the prior exam  ABDOMINAL WALL: Mild generalized soft tissue edema. Nonspecific   infiltration of the right anterior abdominal wall subcutaneous fat,   unchanged.  BONES: Degenerative changes.    IMPRESSION:  Chronic severe right-sided hydroureteronephrosis to the level of the   ureteropelvic junction with marked right renal cortical thinning,   worsened compared with prior exam from 7/22/2022. No obstructing right   ureteral calculus.    Delayed left renal enhancement in the setting of recent contrast   administration. Correlate with renal function. No left-sided   hydronephrosis.    Under distended bladder with bladder wall thickening and multiple   diverticula in the setting of an enlarged prostate suggesting chronic   outlet obstruction.    Hepatosplenomegaly.    Redemonstrated small right pleural effusion.    --- End of Report ---    < end of copied text >   [facility]  [phone]  NEPHROLOGY      INITIAL CONSULT NOTE  --------------------------------------------------------------------------------  HPI:    62 year old male with pmh of HFrEF with an EF of 19% status post AICD, CAD status post stents, DM2, HTN, COPD with a 30+ pack year history of smoking presents to the ED with shortness of breath. As per EMS, patient was noted to be hypoxic to 85% on room air started on nasal cannula.  Noted to be hypotensive in the field and immediately brought to the ED for further evaluation. Endorses chornic cough and feeling more fatigued than usual. In the ED he was noted to be febrile, hypoxic, saturating in mid 90s, and hypotensive, received ivf bolus and was eventally started on pressors. WBC elevated at 19k, lactate elevated at 2.3, BNP at 6243. Had CT angio chest shows no evidence of PE detected, however showing small R pleural effusion and severe right hydronephrosis. started on Empiric Cefepime 1g and Vanc 1g. Due to worsening creatinine, renal consult called.     PAST HISTORY  --------------------------------------------------------------------------------  PAST MEDICAL & SURGICAL HISTORY:  Stented coronary artery      Diabetes      AICD (automatic cardioverter/defibrillator) present      Hypertension      Heart failure with reduced ejection fraction      History of ischemic cardiomyopathy      History of COPD      H/O gastroesophageal reflux (GERD)      H/O vasectomy  20 yrs ago (2000)        FAMILY HISTORY:  Family history of COPD (chronic obstructive pulmonary disease) (Sibling)    Family history of cardiac disorder  Paternal      PAST SOCIAL HISTORY:    ALLERGIES & MEDICATIONS  --------------------------------------------------------------------------------  Allergies    Zosyn (Pruritus)    Intolerances      Standing Inpatient Medications  aspirin  chewable 81 milliGRAM(s) Oral daily  atorvastatin 80 milliGRAM(s) Oral at bedtime  cefepime   IVPB 2000 milliGRAM(s) IV Intermittent every 12 hours  chlorhexidine 4% Liquid 1 Application(s) Topical <User Schedule>  clopidogrel Tablet 75 milliGRAM(s) Oral daily  heparin   Injectable 5000 Unit(s) SubCutaneous every 8 hours  insulin glargine Injectable (LANTUS) 20 Unit(s) SubCutaneous at bedtime  insulin lispro (ADMELOG) corrective regimen sliding scale   SubCutaneous at bedtime  insulin lispro (ADMELOG) corrective regimen sliding scale   SubCutaneous every 6 hours  norepinephrine Infusion 0.037 MICROgram(s)/kG/Min IV Continuous <Continuous>  pantoprazole    Tablet 40 milliGRAM(s) Oral before breakfast  tamsulosin 0.4 milliGRAM(s) Oral at bedtime    PRN Inpatient Medications  acetaminophen     Tablet .. 650 milliGRAM(s) Oral every 6 hours PRN  albuterol    90 MICROgram(s) HFA Inhaler 2 Puff(s) Inhalation three times a day PRN  dextrose 50% Injectable 25 Gram(s) IV Push every 15 minutes PRN      REVIEW OF SYSTEMS  --------------------------------------------------------------------------------  Gen: +fevers/chills   Head/Eyes/Ears/Mouth: No headache; Normal hearing  Respiratory: +dyspnea, -cough,  CV: No chest pain, orthopnea  GI: +N/V, No abdominal pain,   : +decrease urination  MSK: No joint pain/swelling; no back pain  Neuro: +weakness, No dizziness/lightheadedness,   also with no edema     VITALS/PHYSICAL EXAM  --------------------------------------------------------------------------------  T(C): 39.4 (03-02-23 @ 08:00), Max: 40.2 (03-02-23 @ 00:40)  HR: 106 (03-02-23 @ 08:30) (92 - 109)  BP: 103/62 (03-02-23 @ 08:30) (73/57 - 110/69)  RR: 27 (03-02-23 @ 08:30) (14 - 32)  SpO2: 99% (03-02-23 @ 08:30) (94% - 100%)  Wt(kg): --  Height (cm): 175.3 (03-02-23 @ 00:40)  Weight (kg): 118.9 (03-02-23 @ 00:40)  BMI (kg/m2): 38.7 (03-02-23 @ 00:40)  BSA (m2): 2.32 (03-02-23 @ 00:40)      03-01-23 @ 07:01  -  03-02-23 @ 07:00  --------------------------------------------------------  IN: 705.6 mL / OUT: 50 mL / NET: 655.6 mL    03-02-23 @ 07:01  -  03-02-23 @ 09:08  --------------------------------------------------------  IN: 563.4 mL / OUT: 0 mL / NET: 563.4 mL      Physical Exam:  	Gen: ill appearing,   	Pulm: decrease bs  no rales or ronchi or wheezing  	CV: no JVD. RRR, S1S2; no rub  	Back: no sacral edema  	Abd: +BS, soft, nontender/nondistended, obese  	: ?suprapubic distention  	UE: Warm, no cyanosis  no clubbing,  no edema;   	LE: Warm, no cyanosis  no clubbing, no edema, RLE erhythema  	Neuro: alert and oriented.  	Skin: Warm, no decrease skin turgor     LABS/STUDIES  --------------------------------------------------------------------------------              12.7   16.91 >-----------<  129      [03-02-23 @ 01:04]              41.9     131  |  95  |  65  ----------------------------<  287      [03-02-23 @ 06:08]  3.7   |  19  |  3.33        Ca     8.3     [03-02-23 @ 06:08]      Mg     2.1     [03-02-23 @ 06:08]      Phos  5.3     [03-02-23 @ 06:08]    TPro  7.8  /  Alb  3.8  /  TBili  1.3  /  DBili  x   /  AST  52  /  ALT  41  /  AlkPhos  120  [03-02-23 @ 06:08]    PT/INR: PT 21.9 , INR 1.88       [03-02-23 @ 01:04]  PTT: 35.4       [03-02-23 @ 01:04]      Creatinine Trend:  SCr 3.33 [03-02 @ 06:08]  SCr 3.01 [03-02 @ 01:04]  SCr 2.44 [03-01 @ 17:42]    Urinalysis - [03-01-23 @ 18:53]      Color Yellow / Appearance Clear / SG 1.022 / pH 6.0      Gluc 500 mg/dL / Ketone Negative  / Bili Small / Urobili 3 mg/dL       Blood Negative / Protein 100 mg/dl / Leuk Est Negative / Nitrite Negative      RBC 2 / WBC 1 / Hyaline 0 / Gran  / Sq Epi  / Non Sq Epi 0 / Bacteria Negative      Iron 14, TIBC --, %sat --      [03-02-23 @ 01:20]  Ferritin 225      [03-02-23 @ 01:18]  PTH -- (Ca 8.9)      [10-02-22 @ 07:04]   73  HbA1c 8.9      [12-16-19 @ 08:15]  TSH 3.02      [09-30-22 @ 13:45]    HCV 0.09, Nonreact      [12-16-19 @ 08:36]    < from: CT Abdomen and Pelvis No Cont (03.02.23 @ 00:35) >  ACC: [id number] EXAM:  CT ABDOMEN AND PELVIS   ORDERED BY: YESY CASTELLANO     PROCEDURE DATE:  03/02/2023          INTERPRETATION:  CLINICAL INFORMATION: Right hydronephrosis seen on prior   day CT chest.    COMPARISON: CT chest 3/1/2022. CT abdomen pelvis7/2/2022    CONTRAST/COMPLICATIONS:  IV Contrast: NONE  Oral Contrast: NONE  Complications: None reported at time of study completion    PROCEDURE:  CT of the Abdomen and Pelvis was performed.  Sagittal and coronal reformats were performed.    FINDINGS:  LOWER CHEST: Cardiomegaly. Small right pleural effusion with associated   compressive atelectasis. Partially visualized left chest wall battery   pack.    Evaluation of the abdominal and pelvic viscera and vasculature is limited   without the useof IV contrast    LIVER: Hepatomegaly measuring 24 cm in the craniocaudal dimension.   Borderline steatosis.  BILE DUCTS: Normal caliber.  GALLBLADDER: Within normal limits.  SPLEEN: Mild splenomegaly.  PANCREAS: Within normal limits.  ADRENALS: Within normal limits.  KIDNEYS/URETERS: Chronic severe right hydronephrosis to the level of the   ureteropelvic junction with marked thinning of the renal cortex and   interval worsening since prior CT scan 7/2/2022. No obstructing   right-sided ureteral calculus. Delayed enhancement of the left kidney   with multiple hyperdense foci in the renal pelvis, likely retained IV   contrast from prior day CT scan. Some retained contrast is also seen   within the ureter and bladder. No left hydronephrosis. Mild nonspecific   bilateral perinephric stranding.    BLADDER: Under distended bladder with wall thickening and trabeculation   with numerous small bladder diverticula. Contrast within the bladder   lumen.  REPRODUCTIVE ORGANS: Prostate is enlarged.    BOWEL: No bowel obstruction. Scattered colonic diverticulosis without   diverticulitis. Moderate fecal load in the rectosigmoid colon.   Appendectomy.  PERITONEUM: No ascites or pneumoperitoneum.  VESSELS: Atherosclerotic calcifications of the aortoiliac tree.  RETROPERITONEUM/LYMPH NODES: Numerous prominent subcentimeter   retroperitoneal lymph nodes similar to the prior exam  ABDOMINAL WALL: Mild generalized soft tissue edema. Nonspecific   infiltration of the right anterior abdominal wall subcutaneous fat,   unchanged.  BONES: Degenerative changes.    IMPRESSION:  Chronic severe right-sided hydroureteronephrosis to the level of the   ureteropelvic junction with marked right renal cortical thinning,   worsened compared with prior exam from 7/22/2022. No obstructing right   ureteral calculus.    Delayed left renal enhancement in the setting of recent contrast   administration. Correlate with renal function. No left-sided   hydronephrosis.    Under distended bladder with bladder wall thickening and multiple   diverticula in the setting of an enlarged prostate suggesting chronic   outlet obstruction.    Hepatosplenomegaly.    Redemonstrated small right pleural effusion.    --- End of Report ---    < end of copied text >

## 2023-03-02 NOTE — CONSULT NOTE ADULT - ASSESSMENT
Patient is a 62 year old male with PMHx of CHFrEF more recently 29%, has AICD, cardiomyopathy due to ischemia, Diabetes, COPD who was referred to hospital due to concerns for septic shock.    1) Septic Shock 2/2 unconfirmed source  - Not suspected to be due to lower ext ulcer by primary team  - UA without WBC, Chest CT without infiltrates, however motion artifact  - Procal elevated, leukocytosis improving, has not yet achieved defervescence   - 1 dose vanc in ED, now on cefepime renally dosed  - cultures pending  - At time of examination, on 0.05mcg/kg/min levophed, MAP was 91, I asked RN to titrate down levophed dosing.  - repeat formal echocardiogram?  - HOlding all GDMT and diuresis due to shock  - Appreciate management per MICU team.    2) acute on chronic kidney injury  - baseline creatinine 1.5-1.7  - Marked right sided hydronephrosis, chronic due to chronic outlet obstruction  - continue tamsulosin, add finasteride?  - Appreciate nephro consult.  - Renal function worsening, continue monitoring diuresis, routine bladder scan to avoid urinary retention.  - Suspect patient may require more isotonic fluids- albumin given by primary team this AM. - IVC 3cm as reported by bedside POCUS    3) CHFrEF  - Do not suspect cardiogenic shock  - Holding all GDMT and diuresis given hypotension  - May need more isotonic fluids- albumin given by MICU team today  - Patient stopped following heart failure team due to disagreement with cardiomems  - Will speak with patient and his wife, needs to follow heart failure team routinely, I requested for MICU team to consult HF.    4) DMII  - getting better controlled  - On basal insulin and sliding scale.  - Titrate as per blood glucose levels    5) COPD  - Controlled with ERVIN prn  - Few symptoms, minimal exacerbations, and no recent hospitalizations due to exacerbation  - nicotine patches and gum as needed    6) Right Hydronephrosis  - no evidence of stone, more consistent with chronic outlet obstruction  - add finasteride to tamsulosin given hx of BPH?  - Routine bladder scan avoid urinary retention.  - Condom cath in place draining red tinged urine.    7) Anxiety  - controlled with low dose Xanax at home 0.25mg  - avoid now until respiration stable and mental status improved. Patient is a 62 year old male with PMHx of CHFrEF more recently 29%, has AICD, cardiomyopathy due to ischemia, Diabetes, COPD who was referred to hospital due to concerns for septic shock.    1) Septic Shock 2/2 unconfirmed source  - Not suspected to be due to lower ext ulcer by primary team  - UA without WBC, Chest CT without infiltrates, however motion artifact  - Procal elevated, leukocytosis improving, has not yet achieved defervescence   - 1 dose vanc in ED, now on cefepime renally dosed  - cultures pending  - At time of examination, on 0.05mcg/kg/min levophed, MAP was 91, I asked RN to titrate down levophed dosing.  - repeat formal echocardiogram?  - HOlding all GDMT and diuresis due to shock  - Appreciate management per MICU team.    2) acute on chronic kidney injury  - baseline creatinine 1.5-1.7  - Marked right sided hydronephrosis, chronic due to chronic outlet obstruction  - continue tamsulosin, add finasteride?  - Appreciate nephro consult.  - Renal function worsening, continue monitoring diuresis, routine bladder scan to avoid urinary retention.  - Suspect patient may require more isotonic fluids- albumin given by primary team this AM. - IVC 3cm as reported by bedside POCUS    3) CHFrEF  - Do not suspect cardiogenic shock  - Exacerbated in the setting of acute infection  - Holding all GDMT and diuresis given hypotension  - May need more isotonic fluids- albumin given by MICU team today  - Patient stopped following heart failure team due to disagreement with cardiomems  - Will speak with patient and his wife, recommend to follow heart failure team routinely, I requested for primary team to consult HF service.    4) DMII  - getting better controlled  - On basal insulin and sliding scale.  - Titrate as per blood glucose levels    5) COPD  - Controlled with ERVIN prn  - Few symptoms, minimal exacerbations, and no recent hospitalizations due to exacerbation  - nicotine patches and gum as needed    6) Right Hydronephrosis  - no evidence of stone, more consistent with chronic outlet obstruction  - add finasteride to tamsulosin given hx of BPH?  - Routine bladder scan avoid urinary retention.  - Condom cath in place draining red tinged urine.    7) Anxiety  - controlled with low dose Xanax at home 0.25mg  - avoid now until respiration stable and mental status improved.

## 2023-03-02 NOTE — CONSULT NOTE ADULT - ASSESSMENT
62 year old male with pmh of HFrEF with an EF of 19% status post AICD, CAD status post stents, DM2, HTN, COPD with a 30+ pack year history of smoking presents to the ED with shortness of breath.       1- MANJIT on CKD  2- sepsis  3- CHF  4- DM2  5- chronic R hydro

## 2023-03-02 NOTE — PROGRESS NOTE ADULT - ATTENDING COMMENTS
63 y/o M with a PMH of HFrEF s/p AICD and chronic kidney disease. Pt followed by HF as well as cardio and nephro. Pt is being followed by wound care for foot wounds and wife does mention a wound on the right shin and states he has become septic from wounds in the past. States that last night started to have fever and looked unwell. He continued to have fevers and had a few episodes of vomiting and decreased urine from his baseline. Pt is on entresto as well as bumex. Did not take nighttime bumex and did not take nighttime entresto, In the ED pt received bolus however judicious in nature due to extensive hx. Shock state is likely septic and hypovolemic in nature, wean pressors as tolerated.  Cultures ordered and partient on empiric antibiotics. Patient examined and case reviewed in detail on bedside rounds. Frequent bedside visits with therapy change today.  Prognosis guarded.

## 2023-03-02 NOTE — CHART NOTE - NSCHARTNOTEFT_GEN_A_CORE
: Robyn Obrien    INDICATION: Septic Shock    PROCEDURE:  [x] LIMITED ECHO  [x] LIMITED CHEST  [ ] LIMITED RETROPERITONEAL  [ ] LIMITED ABDOMINAL  [ ] LIMITED DVT  [ ] NEEDLE GUIDANCE VASCULAR  [ ] NEEDLE GUIDANCE THORACENTESIS  [ ] NEEDLE GUIDANCE PARACENTESIS  [ ] NEEDLE GUIDANCE PERICARDIOCENTESIS  [ ] OTHER    FINDINGS:     Right Anterior A lines with Bs at the base.  Small right sided pleural effusion with associated atelectasis vs. consolidation.     Left anterior/posterior B lines.    Hypodynamic cardiac function.  Moderate to severe systolic dysfunction.    IVC 3cm without respiratory variation.         INTERPRETATION:    Fully volume resuscitated in setting of hypodynamic cardiac function. Will hold further IVF administration at this time.

## 2023-03-02 NOTE — PATIENT PROFILE ADULT - NSPROIMPLANTSMEDDEV_GEN_A_NUR
What Is The Reason For Today's Visit?: Excessive sun exposure Automatic Implantable Cardioverter Defibrillator/Pacemaker

## 2023-03-02 NOTE — PROGRESS NOTE ADULT - ASSESSMENT
This is a 62 year old male with pmh of HFrEF with an EF of 19% status post AICD, CAD status post stents, DM 2, HTN, COPD with a 30+ pack year history of smoking presents to the ED with shortness of breath, vomiting, diarrhea, hypoxia to mid 80s without NC --> 99% on 1L NC, hypotension requiring pressors in setting of fever admitted to MICU for further management.     --------------------------------------------------    Neuro  - A&O x4  - Not on any sedation    ------------------------------------------------------    Cardiovascular  # septic vs hypovolemic shock in setting of HFrEF of 19% s/p AICD  - 3 episode of vomiting, 1 episode of watery diarrhea in setting of poor PO intake, concerning for possible hypovolemic shock.   - On levo gtt, wean as tolerated.   - EKG shows sinus tachycardia  - POCUS shows severe LV systolic dysfunction with plethoric IVC, scattered B lines throughout  - BNP elevated 6243. Trop elevated at 101 likely in setting of demand from septic shock.     #HTN  - Will hold home meds in setting of hypotension  - At home takes, Bumex 2mg tid, Entresto 24/26 BID, Metoprolol  qD, Spironolactone 25mg qD.    #CAD   -  HFrEF of 19% s/p AICD, GDMT  - Cont Plavix and aspirin.     -----------------------------------------------------------    Respiratory  # hypoxia in setting of poor perfusion  - on Nasal Cannula 1L, saturating above 98%, wean as tolerated.   - Not acidotic or alkalotic on VBG. Will monitor  - CT angio chest showing no evidence of PE although limited by motion. Small right pleural effusion.   - Not concerning for volume overload picture at this time, will continues gentle diuresis.     ------------------------------------------------------------------    GI  # Nausea, vomiting, diarrhea  - Zofran as needed  - Will monitor symptoms and electrolytes. Replete as needed    # Severe R hydronephrosis seen on CT chest  - Hx of R hydronephrosis in 2020, currently presentation likely chronic.   - Pending CT abd/pelv w/o contrast to further evaluate    -----------------------------------------------------------------------    Renal  #ATN in setting of shock  - Creatinine elevated at 2.44  - Baseline around 1.5 to 1.8  - Will monitor and trend. Avoid nephrotoxins and renally dose meds.     --------------------------------------------------------------------------    Endo  #T2DM  -  40 units of Lantus p.m. at home  - Will cont at 20 units of Lantus and ISS.     ----------------------------------------------------------------------    Heme  - No active issues  - DVT PPX: Lovenox 40 qD.     ---------------------------------------------------------------------    ID  #shock 2/2 likely sepsis with unclear etiology.  - Febrile. WBC elevated to 19k.  - s/p Cefepime and Vanc in the ED.  - Cont with cefepime.   - f/u MRSA/MSSA swab  - F/u BCx x2 and UCx  - No clear etiology as of now.  - Urinalysis negative, chronic R hydronephrosis. CT abd/pelv pending  - CT chest with R pleural effusion, with no signs of pneumonia.     ------------------------------------------------------------------------  Ethics  - Full code    Diet   - Regular, consistent carb diet.  This is a 62 year old male with pmh of HFrEF with an EF of 19% status post AICD, CAD status post stents, DM 2, HTN, COPD with a 30+ pack year history of smoking presents to the ED with shortness of breath, vomiting, diarrhea, hypoxia to mid 80s without NC --> 99% on 1L NC, hypotension requiring pressors in setting of fever admitted to MICU for further management.     --------------------------------------------------    Neuro  - A&O x4  - Not on any sedation    ------------------------------------------------------    Cardiovascular  # septic vs hypovolemic shock in setting of HFrEF of 19% s/p AICD  - 3 episode of vomiting, 1 episode of watery diarrhea in setting of poor PO intake, concerning for possible hypovolemic shock.   - On levo gtt, wean as tolerated.   - EKG shows sinus tachycardia  - POCUS shows severe LV systolic dysfunction with plethoric IVC, scattered B lines throughout  - BNP elevated 6243. Trop elevated at 101 likely in setting of demand from septic shock.   - f/u TTE     #HTN  - At home takes, Bumex 2mg tid, Entresto 24/26 BID, Metoprolol  qD, Spironolactone 25mg qD.  - Will hold home meds in setting of hypotension      #CAD   -  HFrEF of 19% s/p AICD, GDMT  - Cont Plavix and aspirin.     -----------------------------------------------------------    Respiratory  # hypoxia in setting of poor perfusion  - on Nasal Cannula 1L, saturating above 98%, wean as tolerated.   - Not acidotic or alkalotic on VBG. Will monitor  - CT angio chest showing no evidence of PE although limited by motion. Small right pleural effusion.   - Not concerning for volume overload picture at this time    ------------------------------------------------------------------    GI  # Nausea, vomiting, diarrhea  - Zofran as needed  - Will monitor symptoms and electrolytes. Replete as needed    # Severe R hydronephrosis seen on CT chest  - Hx of R hydronephrosis in 2020, currently presentation likely chronic.   - Pending CT abd/pelv w/o contrast to further evaluate    -----------------------------------------------------------------------    Renal  #ATN in setting of shock  - Creatinine elevated > 3 (baseline 1.5 to 1.8)   - not retaining on bladder scan   - given albumin (repeat POCUS with reduced IVC) --> increased output 300cc  - Will monitor and trend. Avoid nephrotoxins and renally dose meds.     --------------------------------------------------------------------------    Endo  #T2DM  -  40 units of Lantus p.m. at home  - started 30U lantus + ISS     ----------------------------------------------------------------------    Heme  - No active issues  - DVT PPX: Lovenox 40 qD.     ---------------------------------------------------------------------    ID  #shock 2/2 likely sepsis with unclear etiology.  - Febrile. WBC elevated to 19k.  - s/p Cefepime and Vanc in the ED.  - Cont with cefepime and dose vanc by level   - f/u MRSA  - F/u BCx x2  - No clear etiology as of now.  - Urinalysis negative, chronic R hydronephrosis. CT abd/pelv pending  - CT chest with R pleural effusion, with no signs of pneumonia.     ------------------------------------------------------------------------  Ethics  - Full code    Diet   - Regular, consistent carb diet.

## 2023-03-02 NOTE — PATIENT PROFILE ADULT - FALL HARM RISK - UNIVERSAL INTERVENTIONS
Bed in lowest position, wheels locked, appropriate side rails in place/Call bell, personal items and telephone in reach/Instruct patient to call for assistance before getting out of bed or chair/Non-slip footwear when patient is out of bed/[place] to call system/Physically safe environment - no spills, clutter or unnecessary equipment/Purposeful Proactive Rounding/Room/bathroom lighting operational, light cord in reach

## 2023-03-03 LAB
ALBUMIN SERPL ELPH-MCNC: 3.5 G/DL — SIGNIFICANT CHANGE UP (ref 3.3–5)
ALBUMIN SERPL ELPH-MCNC: 3.5 G/DL — SIGNIFICANT CHANGE UP (ref 3.3–5)
ALP SERPL-CCNC: 129 U/L — HIGH (ref 40–120)
ALP SERPL-CCNC: 157 U/L — HIGH (ref 40–120)
ALT FLD-CCNC: 115 U/L — HIGH (ref 10–45)
ALT FLD-CCNC: 99 U/L — HIGH (ref 10–45)
ANION GAP SERPL CALC-SCNC: 17 MMOL/L — SIGNIFICANT CHANGE UP (ref 5–17)
ANION GAP SERPL CALC-SCNC: 19 MMOL/L — HIGH (ref 5–17)
APTT BLD: 33.1 SEC — SIGNIFICANT CHANGE UP (ref 27.5–35.5)
AST SERPL-CCNC: 118 U/L — HIGH (ref 10–40)
AST SERPL-CCNC: 68 U/L — HIGH (ref 10–40)
BASE EXCESS BLDV CALC-SCNC: -6.9 MMOL/L — LOW (ref -2–3)
BASE EXCESS BLDV CALC-SCNC: -7 MMOL/L — LOW (ref -2–3)
BILIRUB SERPL-MCNC: 1.1 MG/DL — SIGNIFICANT CHANGE UP (ref 0.2–1.2)
BILIRUB SERPL-MCNC: 1.1 MG/DL — SIGNIFICANT CHANGE UP (ref 0.2–1.2)
BUN SERPL-MCNC: 85 MG/DL — HIGH (ref 7–23)
BUN SERPL-MCNC: 94 MG/DL — HIGH (ref 7–23)
CA-I SERPL-SCNC: 1.06 MMOL/L — LOW (ref 1.15–1.33)
CA-I SERPL-SCNC: 1.09 MMOL/L — LOW (ref 1.15–1.33)
CALCIUM SERPL-MCNC: 8.3 MG/DL — LOW (ref 8.4–10.5)
CALCIUM SERPL-MCNC: 8.5 MG/DL — SIGNIFICANT CHANGE UP (ref 8.4–10.5)
CHLORIDE BLDV-SCNC: 93 MMOL/L — LOW (ref 96–108)
CHLORIDE BLDV-SCNC: 95 MMOL/L — LOW (ref 96–108)
CHLORIDE SERPL-SCNC: 92 MMOL/L — LOW (ref 96–108)
CHLORIDE SERPL-SCNC: 92 MMOL/L — LOW (ref 96–108)
CO2 BLDV-SCNC: 20 MMOL/L — LOW (ref 22–26)
CO2 BLDV-SCNC: 22 MMOL/L — SIGNIFICANT CHANGE UP (ref 22–26)
CO2 SERPL-SCNC: 19 MMOL/L — LOW (ref 22–31)
CO2 SERPL-SCNC: 20 MMOL/L — LOW (ref 22–31)
CREAT SERPL-MCNC: 3.9 MG/DL — HIGH (ref 0.5–1.3)
CREAT SERPL-MCNC: 4.65 MG/DL — HIGH (ref 0.5–1.3)
EGFR: 14 ML/MIN/1.73M2 — LOW
EGFR: 17 ML/MIN/1.73M2 — LOW
GAS PNL BLDV: 126 MMOL/L — LOW (ref 136–145)
GAS PNL BLDV: 127 MMOL/L — LOW (ref 136–145)
GAS PNL BLDV: SIGNIFICANT CHANGE UP
GI PCR PANEL: SIGNIFICANT CHANGE UP
GLUCOSE BLDC GLUCOMTR-MCNC: 199 MG/DL — HIGH (ref 70–99)
GLUCOSE BLDC GLUCOMTR-MCNC: 239 MG/DL — HIGH (ref 70–99)
GLUCOSE BLDC GLUCOMTR-MCNC: 246 MG/DL — HIGH (ref 70–99)
GLUCOSE BLDC GLUCOMTR-MCNC: 254 MG/DL — HIGH (ref 70–99)
GLUCOSE BLDV-MCNC: 240 MG/DL — HIGH (ref 70–99)
GLUCOSE BLDV-MCNC: 248 MG/DL — HIGH (ref 70–99)
GLUCOSE SERPL-MCNC: 237 MG/DL — HIGH (ref 70–99)
GLUCOSE SERPL-MCNC: 240 MG/DL — HIGH (ref 70–99)
HCO3 BLDV-SCNC: 18 MMOL/L — LOW (ref 22–29)
HCO3 BLDV-SCNC: 20 MMOL/L — LOW (ref 22–29)
HCT VFR BLD CALC: 39.5 % — SIGNIFICANT CHANGE UP (ref 39–50)
HCT VFR BLDA CALC: 34 % — LOW (ref 39–51)
HCT VFR BLDA CALC: 37 % — LOW (ref 39–51)
HGB BLD CALC-MCNC: 11.2 G/DL — LOW (ref 12.6–17.4)
HGB BLD CALC-MCNC: 12.3 G/DL — LOW (ref 12.6–17.4)
HGB BLD-MCNC: 11.9 G/DL — LOW (ref 13–17)
HOROWITZ INDEX BLDV+IHG-RTO: 24 — SIGNIFICANT CHANGE UP
HOROWITZ INDEX BLDV+IHG-RTO: 28 — SIGNIFICANT CHANGE UP
INR BLD: 1.57 RATIO — HIGH (ref 0.88–1.16)
LACTATE BLDV-MCNC: 1.3 MMOL/L — SIGNIFICANT CHANGE UP (ref 0.5–2)
LACTATE BLDV-MCNC: 2 MMOL/L — SIGNIFICANT CHANGE UP (ref 0.5–2)
MAGNESIUM SERPL-MCNC: 2.1 MG/DL — SIGNIFICANT CHANGE UP (ref 1.6–2.6)
MAGNESIUM SERPL-MCNC: 2.3 MG/DL — SIGNIFICANT CHANGE UP (ref 1.6–2.6)
MCHC RBC-ENTMCNC: 23.6 PG — LOW (ref 27–34)
MCHC RBC-ENTMCNC: 30.1 GM/DL — LOW (ref 32–36)
MCV RBC AUTO: 78.4 FL — LOW (ref 80–100)
NRBC # BLD: 0 /100 WBCS — SIGNIFICANT CHANGE UP (ref 0–0)
PCO2 BLDV: 36 MMHG — LOW (ref 42–55)
PCO2 BLDV: 49 MMHG — SIGNIFICANT CHANGE UP (ref 42–55)
PH BLDV: 7.23 — LOW (ref 7.32–7.43)
PH BLDV: 7.32 — SIGNIFICANT CHANGE UP (ref 7.32–7.43)
PHOSPHATE SERPL-MCNC: 6.4 MG/DL — HIGH (ref 2.5–4.5)
PHOSPHATE SERPL-MCNC: 7.4 MG/DL — HIGH (ref 2.5–4.5)
PLATELET # BLD AUTO: 97 K/UL — LOW (ref 150–400)
PO2 BLDV: 26 MMHG — SIGNIFICANT CHANGE UP (ref 25–45)
PO2 BLDV: 71 MMHG — HIGH (ref 25–45)
POTASSIUM BLDV-SCNC: 3.7 MMOL/L — SIGNIFICANT CHANGE UP (ref 3.5–5.1)
POTASSIUM BLDV-SCNC: 4 MMOL/L — SIGNIFICANT CHANGE UP (ref 3.5–5.1)
POTASSIUM SERPL-MCNC: 3.8 MMOL/L — SIGNIFICANT CHANGE UP (ref 3.5–5.3)
POTASSIUM SERPL-MCNC: 3.8 MMOL/L — SIGNIFICANT CHANGE UP (ref 3.5–5.3)
POTASSIUM SERPL-SCNC: 3.8 MMOL/L — SIGNIFICANT CHANGE UP (ref 3.5–5.3)
POTASSIUM SERPL-SCNC: 3.8 MMOL/L — SIGNIFICANT CHANGE UP (ref 3.5–5.3)
PROT SERPL-MCNC: 7 G/DL — SIGNIFICANT CHANGE UP (ref 6–8.3)
PROT SERPL-MCNC: 7.2 G/DL — SIGNIFICANT CHANGE UP (ref 6–8.3)
PROTHROM AB SERPL-ACNC: 18.1 SEC — HIGH (ref 10.5–13.4)
RBC # BLD: 5.04 M/UL — SIGNIFICANT CHANGE UP (ref 4.2–5.8)
RBC # FLD: 19.8 % — HIGH (ref 10.3–14.5)
SAO2 % BLDV: 38.3 % — LOW (ref 67–88)
SAO2 % BLDV: 89.9 % — HIGH (ref 67–88)
SODIUM SERPL-SCNC: 128 MMOL/L — LOW (ref 135–145)
SODIUM SERPL-SCNC: 131 MMOL/L — LOW (ref 135–145)
VANCOMYCIN FLD-MCNC: 5.2 UG/ML — SIGNIFICANT CHANGE UP
WBC # BLD: 9.1 K/UL — SIGNIFICANT CHANGE UP (ref 3.8–10.5)
WBC # FLD AUTO: 9.1 K/UL — SIGNIFICANT CHANGE UP (ref 3.8–10.5)

## 2023-03-03 PROCEDURE — 76700 US EXAM ABDOM COMPLETE: CPT | Mod: 26,59

## 2023-03-03 PROCEDURE — 99291 CRITICAL CARE FIRST HOUR: CPT

## 2023-03-03 PROCEDURE — 93976 VASCULAR STUDY: CPT | Mod: 26

## 2023-03-03 PROCEDURE — 93970 EXTREMITY STUDY: CPT | Mod: 26

## 2023-03-03 PROCEDURE — 99223 1ST HOSP IP/OBS HIGH 75: CPT

## 2023-03-03 RX ORDER — CEFTRIAXONE 500 MG/1
2000 INJECTION, POWDER, FOR SOLUTION INTRAMUSCULAR; INTRAVENOUS EVERY 24 HOURS
Refills: 0 | Status: DISCONTINUED | OUTPATIENT
Start: 2023-03-03 | End: 2023-03-05

## 2023-03-03 RX ORDER — VANCOMYCIN HCL 1 G
1000 VIAL (EA) INTRAVENOUS ONCE
Refills: 0 | Status: COMPLETED | OUTPATIENT
Start: 2023-03-03 | End: 2023-03-03

## 2023-03-03 RX ORDER — ACETAMINOPHEN 500 MG
650 TABLET ORAL ONCE
Refills: 0 | Status: DISCONTINUED | OUTPATIENT
Start: 2023-03-03 | End: 2023-03-11

## 2023-03-03 RX ORDER — BUMETANIDE 0.25 MG/ML
4 INJECTION INTRAMUSCULAR; INTRAVENOUS ONCE
Refills: 0 | Status: COMPLETED | OUTPATIENT
Start: 2023-03-03 | End: 2023-03-03

## 2023-03-03 RX ORDER — NICOTINE POLACRILEX 2 MG
1 GUM BUCCAL DAILY
Refills: 0 | Status: DISCONTINUED | OUTPATIENT
Start: 2023-03-03 | End: 2023-03-11

## 2023-03-03 RX ORDER — INSULIN GLARGINE 100 [IU]/ML
35 INJECTION, SOLUTION SUBCUTANEOUS AT BEDTIME
Refills: 0 | Status: DISCONTINUED | OUTPATIENT
Start: 2023-03-03 | End: 2023-03-09

## 2023-03-03 RX ORDER — ALBUMIN HUMAN 25 %
100 VIAL (ML) INTRAVENOUS ONCE
Refills: 0 | Status: COMPLETED | OUTPATIENT
Start: 2023-03-03 | End: 2023-03-03

## 2023-03-03 RX ADMIN — Medication 6: at 12:30

## 2023-03-03 RX ADMIN — Medication 2: at 22:33

## 2023-03-03 RX ADMIN — Medication 4: at 08:00

## 2023-03-03 RX ADMIN — Medication 650 MILLIGRAM(S): at 04:35

## 2023-03-03 RX ADMIN — Medication 1 PATCH: at 19:00

## 2023-03-03 RX ADMIN — ATORVASTATIN CALCIUM 80 MILLIGRAM(S): 80 TABLET, FILM COATED ORAL at 22:33

## 2023-03-03 RX ADMIN — Medication 4: at 17:06

## 2023-03-03 RX ADMIN — CLOPIDOGREL BISULFATE 75 MILLIGRAM(S): 75 TABLET, FILM COATED ORAL at 12:01

## 2023-03-03 RX ADMIN — INSULIN GLARGINE 35 UNIT(S): 100 INJECTION, SOLUTION SUBCUTANEOUS at 22:32

## 2023-03-03 RX ADMIN — PANTOPRAZOLE SODIUM 40 MILLIGRAM(S): 20 TABLET, DELAYED RELEASE ORAL at 07:58

## 2023-03-03 RX ADMIN — ONDANSETRON 4 MILLIGRAM(S): 8 TABLET, FILM COATED ORAL at 02:44

## 2023-03-03 RX ADMIN — CEFEPIME 100 MILLIGRAM(S): 1 INJECTION, POWDER, FOR SOLUTION INTRAMUSCULAR; INTRAVENOUS at 05:03

## 2023-03-03 RX ADMIN — BUMETANIDE 132 MILLIGRAM(S): 0.25 INJECTION INTRAMUSCULAR; INTRAVENOUS at 10:39

## 2023-03-03 RX ADMIN — CHLORHEXIDINE GLUCONATE 1 APPLICATION(S): 213 SOLUTION TOPICAL at 05:03

## 2023-03-03 RX ADMIN — Medication 250 MILLIGRAM(S): at 12:52

## 2023-03-03 RX ADMIN — HEPARIN SODIUM 5000 UNIT(S): 5000 INJECTION INTRAVENOUS; SUBCUTANEOUS at 07:58

## 2023-03-03 RX ADMIN — Medication 81 MILLIGRAM(S): at 12:01

## 2023-03-03 RX ADMIN — HEPARIN SODIUM 5000 UNIT(S): 5000 INJECTION INTRAVENOUS; SUBCUTANEOUS at 17:05

## 2023-03-03 RX ADMIN — CEFTRIAXONE 100 MILLIGRAM(S): 500 INJECTION, POWDER, FOR SOLUTION INTRAMUSCULAR; INTRAVENOUS at 17:05

## 2023-03-03 RX ADMIN — Medication 1 PATCH: at 12:31

## 2023-03-03 RX ADMIN — Medication 650 MILLIGRAM(S): at 05:00

## 2023-03-03 RX ADMIN — Medication 50 MILLILITER(S): at 05:02

## 2023-03-03 RX ADMIN — TAMSULOSIN HYDROCHLORIDE 0.4 MILLIGRAM(S): 0.4 CAPSULE ORAL at 22:33

## 2023-03-03 NOTE — PHYSICAL THERAPY INITIAL EVALUATION ADULT - GAIT TRAINING, PT EVAL
GOAL: Patient will ambulate 200 feet independently with least restrictive assistive device in 2 weeks.

## 2023-03-03 NOTE — CONSULT NOTE ADULT - NS ATTEND AMEND GEN_ALL_CORE FT
62M multiple comorbidities.   Here with acute fevers, shock.   Recovering but not sure what we treated.   Largely nonfocal and workup without clear infection.   Acute cellulitis from right shin wound? Toxin mediated event? The leg looks quite calm and per MICU never looked convincing for cellulitis.   I think we can safely narrow to Ceftriaxone.     Sin Echeverria MD   Infectious Disease   Available on TEAMS. After 5PM and on weekends please page fellow on call or call [phone] 62M multiple comorbidities.   Here with acute fevers, shock.   Recovering but not sure what we treated.   Largely nonfocal and workup without clear infection.   Acute cellulitis from right shin wound? Non-GAS Strep may be able to cause a shock-like syndrome https://pubmed.ncbi.nlm.nih.gov/1006921/ but the leg looks pretty calm and per MICU did not look convincing for cellulitis prior.   I think we can safely narrow to Ceftriaxone.     Sin Echeverria MD   Infectious Disease   Available on TEAMS. After 5PM and on weekends please page fellow on call or call [phone]

## 2023-03-03 NOTE — PHYSICAL EXAM
[Well Nourished] : well nourished [No Acute Distress] : no acute distress [No Xanthelasma] : no xanthelasma [Normal S1, S2] : normal S1, S2 [No Gallop] : no gallop [Clear Lung Fields] : clear lung fields [Good Air Entry] : good air entry [Soft] : abdomen soft [Normal Gait] : normal gait [No Rash] : no rash [Moves all extremities] : moves all extremities [Alert and Oriented] : alert and oriented [de-identified] : JVP ~12 cm of H20 [de-identified] : warm peripherally  [de-identified] : +1 b/l LE edema

## 2023-03-03 NOTE — CARDIOLOGY SUMMARY
[de-identified] : \par 10/3/22 TTE: LVIDd 6.5, LVEF 19%, sev global LVSdysfunction, intermediate diastolic function, nml BiAtria, mld MR, mild AR, mi.d TR, Dilated IVC (2.6cm), RA pressure 15mmHg, RVSP 56mmHg\par \par TTE 2019: LVEF 10-15% LVIDd 7cm, mild MR, RVSP 26mmHg (RAP 8mmHg)\par  [de-identified] : \par [facility] 2019 LM normal, LAD ostial 100%, LCx 80%, RI normal, RCA mid 50%. S/p NERI to LCx. \par

## 2023-03-03 NOTE — HISTORY OF PRESENT ILLNESS
[FreeTextEntry1] : Mr. Jessica is a 61 y/o male with h/o chronic systolic HF ACC/AHA stage C-D, ICMP LVEF 10-15% LVIDd 7cm, CAD s/p anterior wall STEMI ‘PCI to large RI ’18 PCI to LCx ‘19, single chamber ICD s/p extraction due to bacteremia followed by s/p S-ICD implant ‘20`, HTN, recent tobacco use (quit 9/2022), COPD, LUIS ALFREDO not on CPAP, DM 2 (a1c 7%), L toe osteomyelitis s/p amputation 1/2022, CKD 3 and h/o right mod-sev hydronephrosis who presents today for follow-up of his HF.\par \par He has had has long hx of ischemic cardiomyopathy starting in 2004 s/p MI. Was hospitalized in [place] in this year x 4weeks in setting of covid-19 infection, cellulitis, septic shock, ADHF and MANJIT requiring temporary RRT. Both him and his wife reports most of his medications had to be discontinued due to low BP and he was temporarily on dialysis due to worsening kidney function.\par \par He presented to [facility] on 9/30/22 with several weeks of lower extremity edema, orthopnea, and abdominal distension admitted for ADHF. He was diuresed approximately 24 lbs on a Bumex gtt. Discharged on 10/7 at a weight of 248 lbs. \par \clifford Since last seen had had fluctuations in weight likely due to inconsistent doses of Bumex 4 mg BID as her recently has had to travel for his sons wedding. He reports self reducing Bumex to 4 mg qd from BID since Friday 10/28 and though his weight is not yet at goal it is  downtrending from 255 lbs to home weight today of 251 lbs. He does not perform distance walking but states he was able to recently walk throughout. mall shopping without limitations. His home SBP readings range from 95 - 110 and his HR ranges from 's. He remains with unchanged + 2 pillow orthopnea, denies PND, no bendopnea. His appetite has been good and he continues to limit sodium in his diet though admits to occasional discretions. otherwise, no LH/Dizziness, no CP/Palpitations, no abdominal distention and no LE edema. His ICD has not fired. \par \par Not much has changed since his last visit. HE reports +1 Le edema and has been adjusting his bumex dose. His wife also reports orthopnea and cough.  No hospitalizations since his last visit. No ICD shocks. \par

## 2023-03-03 NOTE — PHYSICAL THERAPY INITIAL EVALUATION ADULT - ADDITIONAL COMMENTS
PTA pt was living with his spouse in a private house with no JORJE. Patient was independent with ADLs and ambulation prior to admission. Patient with no prior home care services. PTA pt was living with his spouse in a private house with no JORJE. Patient was independent with ADLs and ambulation prior to admission.

## 2023-03-03 NOTE — PROGRESS NOTE ADULT - ASSESSMENT
Patient is a 62 year old male with PMHx of CHFrEF more recently 29%, has AICD, cardiomyopathy due to ischemia, Diabetes, COPD who was referred to hospital due to concerns for septic shock.    1) Sepsis with unclear source  - leukocytosis improved, not yet reached defervescence  - cultures no growth to date  - Restarted on vancomycin, s/p 1 dose in ED, on cefepime  - renal function worsening     2) acute on chronic kidney injury  - Worsening  - Still making urine, albeit less than baseline  - started on further diuresis, POCUS performed by primary team with improve ivc diameter  - Appreciate renal follow up    3) CHFrEF  - Off pressors, no ionotropes blood pressure becoming stable  - Labs with signs of congestive hepatopathy, renal function worsening  - recommend cardio eval    4) DMII  - getting better controlled  - On basal insulin and sliding scale.  - Titrate as per blood glucose levels    5) COPD  - Controlled with ERVIN prn  - Few symptoms, minimal exacerbations, and no recent hospitalizations due to exacerbation  - nicotine patches and gum as needed    6) Right Hydronephrosis  - no evidence of stone, more consistent with chronic outlet obstruction  - Routine bladder scan avoid urinary retention.      7) Anxiety  - controlled with low dose Xanax at home 0.25mg  - somewhat emotional this AM    8) hyponatremia  - 130 with correction for hyperglycemia  - calculated serum osm 300  - needs better control of hyperglycemia  - urine studies   initially suspected euvolemic v hypovolemic on exam, however given hx of chf now worsening transaminitis, has been started on diuresis.

## 2023-03-03 NOTE — PROGRESS NOTE ADULT - ASSESSMENT
62 year old male with pmh of HFrEF with an EF of 19% status post AICD, CAD status post stents, DM2, HTN, COPD with a 30+ pack year history of smoking presents to the ED with shortness of breath.       1- MANJIT on CKD i,e ATN   2- sepsis  3- CHF  4- DM2  5- chronic R hydro    MANJIT on ckd III in setting of sepsis/fevers/hypotension   bp is better off pressors when seen   has hx of cardiomyopathy with ef < 20 %   volume resuscitate as needed with close monitoring of his renal function   worsening renal function is quite concerning.   strict I/O  pan culture  NGTD   d/w ICU team when seen earlier. 67.1

## 2023-03-03 NOTE — PHYSICAL THERAPY INITIAL EVALUATION ADULT - GENERAL OBSERVATIONS, REHAB EVAL
Rec'd Rec'd sitting in bedside chair, +ICU monitoring, +BP cuff, +pulse ox, +IV, lethargic but agreeable to PT.

## 2023-03-03 NOTE — CONSULT NOTE ADULT - SUBJECTIVE AND OBJECTIVE BOX
Patient is a 62y old  Male who presents with a chief complaint of Hypotension requiring pressors in setting of hypoxia and fever. (03 Mar 2023 07:06)    HPI:  This is a 62 year old male with pmh of HFrEF with an EF of 19% status post AICD, CAD status post stents, DM 2, HTN, COPD with a 30+ pack year history of smoking presents to the ED with shortness of breath that started around 5pm after dinner. Also vomited x3. 1x diarrhea.  As per EMS, patient was noted to be hypoxic to 85% on room air started on nasal cannula.  Noted to be hypotensive in the field and immediately brought to the ED for further evaluation. Endorses chornic cough and feeling more fatigued than usual but otherwise denies any chest pain, abd pain, headaches, numbness/tingling. Of note, patient has history of chronic ulcer of R 3rd foot and L foot 5th digit dorsal wound  In the ED, febrile to 38.2 C, started on 6L nasal cannula, saturating in mid 90s. Noted to be febrile. Systolic BP to be in 70s and 90s. 500 mL bolus given with no adequate response. Levo gtt started. MAP of 75 on levo 0.05. WBC elevated at 19k, Hgb of 12.8, lactate elevated at 2.3, Trop at 101 and BNP at 6243. No signs of UTI on the urinalysis. VBG with pH 7.36/41/45/23. Chest x ray clear. CT angio chest shows no evidence of PE detected, however showing small R pleural effusion and severe right hydronephrosis. CT abd/pelv w/o contrast pending.   s/p Empiric Cefepime 1g and Vanc 1g, Tylenol 1g.    (01 Mar 2023 22:08)       REVIEW OF SYSTEMS  [  ] ROS unobtainable because:    [ x ] All other systems negative except as noted below    Constitutional:  [ ] fever [ ] chills  [ ] weight loss  [ ]night sweat  [ ]poor appetite/PO intake [ ]fatigue   Skin:  [ ] rash [ ] phlebitis	  Eyes: [ ] icterus [ ] pain  [ ] discharge	  ENMT: [ ] sore throat  [ ] thrush [ ] ulcers [ ] exudates [ ]anosmia  Respiratory: [ ] dyspnea [ ] hemoptysis [ ] cough [ ] sputum	  Cardiovascular:  [ ] chest pain [ ] palpitations [ ] edema	  Gastrointestinal:  [ ] nausea [ ] vomiting [ ] diarrhea [ ] constipation [ ] pain	  Genitourinary:  [ ] dysuria [ ] frequency [ ] hematuria [ ] discharge [ ] flank pain  [ ] incontinence  Musculoskeletal:  [ ] myalgias [ ] arthralgias [ ] arthritis  [ ] back pain  Neurological:  [ ] headache [ ] weakness [ ] seizures  [ ] confusion/altered mental status    prior hospital charts reviewed [V]  primary team notes reviewed [V]  other consultant notes reviewed [V]    PAST MEDICAL & SURGICAL HISTORY:  Stented coronary artery      Diabetes      AICD (automatic cardioverter/defibrillator) present      Hypertension      Heart failure with reduced ejection fraction      History of ischemic cardiomyopathy      History of COPD      H/O gastroesophageal reflux (GERD)      H/O vasectomy  20 yrs ago ()          SOCIAL HISTORY:  - Denied smoking/vaping/alcohol/recreational drug use    FAMILY HISTORY:  Family history of COPD (chronic obstructive pulmonary disease) (Sibling)    Family history of cardiac disorder  Paternal        Allergies  Zosyn (Pruritus)        ANTIMICROBIALS:  cefepime   IVPB 2000 every 12 hours  vancomycin  IVPB 1000 once      ANTIMICROBIALS (past 90 days):  MEDICATIONS  (STANDING):  cefepime   IVPB   100 mL/Hr IV Intermittent (23 @ 05:03)   100 mL/Hr IV Intermittent (23 @ 17:28)   100 mL/Hr IV Intermittent (23 @ 05:24)    cefepime   IVPB   100 mL/Hr IV Intermittent (23 @ 18:59)    vancomycin  IVPB.   250 mL/Hr IV Intermittent (23 @ 19:35)        OTHER MEDS:   MEDICATIONS  (STANDING):  acetaminophen     Tablet .. 650 every 6 hours PRN  acetaminophen  Suppository .. 650 once PRN  albuterol    90 MICROgram(s) HFA Inhaler 2 three times a day PRN  aspirin  chewable 81 daily  atorvastatin 80 at bedtime  clopidogrel Tablet 75 daily  dextrose 50% Injectable 25 every 15 minutes PRN  heparin   Injectable 5000 every 8 hours  insulin glargine Injectable (LANTUS) 30 at bedtime  insulin lispro (ADMELOG) corrective regimen sliding scale  Before meals and at bedtime  norepinephrine Infusion 0.037 <Continuous>  pantoprazole    Tablet 40 before breakfast  tamsulosin 0.4 at bedtime      VITALS:  Vital Signs Last 24 Hrs  T(F): 97.8 (23 @ 12:00), Max: 104.4 (23 @ 00:40)    Vital Signs Last 24 Hrs  HR: 98 (23 @ 12:00) (95 - 109)  BP: 129/65 (23 @ 12:00) (79/58 - 138/63)  RR: 21 (23 @ 12:00)  SpO2: 94% (23 @ 12:00) (89% - 100%)  Wt(kg): --    EXAM:    GA: NAD, AOx3  HEENT: oral cavity no lesion  CV: nl S1/S2, no RMG  Lungs: CTAB, No distress  Abd: BS+, soft, nontender, no rebounding pain  Ext: no edema  Neuro: No focal deficits  Skin: Intact  IV: no phlebitis    Labs:                        11.9   9.10  )-----------( 97       ( 03 Mar 2023 00:52 )             39.5         128<L>  |  92<L>  |  85<H>  ----------------------------<  240<H>  3.8   |  19<L>  |  3.90<H>    Ca    8.5      03 Mar 2023 00:52  Phos  6.4       Mg     2.1         TPro  7.0  /  Alb  3.5  /  TBili  1.1  /  DBili  x   /  AST  118<H>  /  ALT  115<H>  /  AlkPhos  129<H>        WBC Trend:  WBC Count: 9.10 (23 @ 00:52)  WBC Count: 16.91 (23 @ 01:04)  WBC Count: 19.19 (23 @ 17:42)      Auto Neutrophil #: 15.44 K/uL (23 @ 01:04)  Auto Neutrophil #: 18.35 K/uL (23 @ 17:42)  Band Neutrophils %: 5.2 % (23 @ 17:42)  Auto Neutrophil #: 5.47 K/uL (22 @ 14:39)  Auto Neutrophil #: 4.69 K/uL (22 @ 13:45)      Creatine Trend:  Creatinine, Serum: 3.90 ()  Creatinine, Serum: 3.33 ()  Creatinine, Serum: 3.01 ()  Creatinine, Serum: 2.44 ()      Liver Biochemical Testing Trend:  Alanine Aminotransferase (ALT/SGPT): 115 *H* ()  Alanine Aminotransferase (ALT/SGPT): 41 ()  Alanine Aminotransferase (ALT/SGPT): 28 ()  Alanine Aminotransferase (ALT/SGPT): 18 ()  Alanine Aminotransferase (ALT/SGPT): 15 (12)  Aspartate Aminotransferase (AST/SGOT): 118 (23 @ 00:52)  Aspartate Aminotransferase (AST/SGOT): 52 (23 @ 06:08)  Aspartate Aminotransferase (AST/SGOT): 35 (23 @ 01:04)  Aspartate Aminotransferase (AST/SGOT): 23 (23 @ 17:42)  Aspartate Aminotransferase (AST/SGOT): 16 (22 @ 19:24)  Bilirubin Total, Serum: 1.1 ()  Bilirubin Total, Serum: 1.3 ()  Bilirubin Total, Serum: 1.3 ()  Bilirubin Total, Serum: 1.2 ()  Bilirubin Total, Serum: 0.3 ()      Trend LDH      Auto Eosinophil %: 0.2 % (23 @ 01:04)  Auto Eosinophil %: 0.0 % (23 @ 17:42)      Urinalysis Basic - ( 01 Mar 2023 18:53 )    Color: Yellow / Appearance: Clear / S.022 / pH: x  Gluc: x / Ketone: Negative  / Bili: Small / Urobili: 3 mg/dL   Blood: x / Protein: 100 mg/dl / Nitrite: Negative   Leuk Esterase: Negative / RBC: 2 /hpf / WBC 1 /HPF   Sq Epi: x / Non Sq Epi: 0 / Bacteria: Negative        MICROBIOLOGY:  Vancomycin Level, Random: 5.2 ( @ 10:41)    MRSA PCR Result.: NotDetec (23 @ 19:27)  MRSA PCR Result.: NotDetec (22 @ 07:02)      Culture - Urine (collected 01 Mar 2023 18:53)  Source: Clean Catch Clean Catch (Midstream)  Final Report:    <10,000 CFU/mL Normal Urogenital Dorothy    Culture - Blood (collected 01 Mar 2023 17:24)  Source: .Blood Blood-Peripheral  Preliminary Report:    No growth to date.    Culture - Blood (collected 01 Mar 2023 17:24)  Source: .Blood Blood-Peripheral  Preliminary Report:    No growth to date.    Culture - Urine (collected 12 Dec 2022 16:28)  Source: Clean Catch Clean Catch (Midstream)  Final Report:    <10,000 CFU/mL Normal Urogenital Dorothy    Culture - Blood (collected 12 Dec 2022 14:15)  Source: .Blood Blood-Peripheral  Final Report:    No Growth Final    Culture - Blood (collected 12 Dec 2022 14:00)  Source: .Blood Blood-Peripheral  Final Report:    No Growth Final    Culture - Urine (collected 2022 21:00)  Source: Clean Catch Clean Catch (Midstream)  Final Report:    <10,000 CFU/mL Normal Urogenital Dorothy    Culture - Blood (collected 2022 19:28)  Source: .Blood Blood-Peripheral  Final Report:    No Growth Final    Culture - Blood (collected 2022 19:25)  Source: .Blood Blood-Peripheral  Final Report:    No Growth Final    Culture - Other (collected 2022 22:44)  Source: .Other  Final Report:    No growth at 48 hours    Procalcitonin, Serum: 10.24 ()      Ferritin, Serum: 225 ()        Serum Pro-Brain Natriuretic Peptide: 6243 ()    Troponin T, High Sensitivity Result: 101 ()    Blood Gas Venous - Lactate: 1.3 ( @ 00:48)  Blood Gas Venous - Lactate: 2.3 ( @ 06:03)  Blood Gas Venous - Lactate: 2.3 ( @ 00:57)  Blood Gas Venous - Lactate: 2.0 ( @ 19:38)    A1C with Estimated Average Glucose Result: 7.4 % (23 @ 01:04)  A1C with Estimated Average Glucose Result: 8.2 % (22 @ 06:30)  A1C with Estimated Average Glucose Result: 7.0 % (10-01-22 @ 06:04)    Sedimentation Rate, Erythrocyte: 92 mm/hr (22 @ 20:08)    CSF:    RADIOLOGY:  < from: CT Abdomen and Pelvis No Cont (23 @ 00:35) >  PROCEDURE:  CT of the Abdomen and Pelvis was performed.  Sagittal and coronal reformats were performed.    FINDINGS:  LOWER CHEST: Cardiomegaly. Small right pleural effusion with associated   compressive atelectasis. Partially visualized left chest wall battery   pack.    Evaluation of the abdominal and pelvic viscera and vasculature is limited   without the useof IV contrast    LIVER: Hepatomegaly measuring 24 cm in the craniocaudal dimension.   Borderline steatosis.  BILE DUCTS: Normal caliber.  GALLBLADDER: Within normal limits.  SPLEEN: Mild splenomegaly.  PANCREAS: Within normal limits.  ADRENALS: Within normal limits.  KIDNEYS/URETERS: Chronic severe right hydronephrosis to the level of the   ureteropelvic junction with marked thinning of the renal cortex and   interval worsening since prior CT scan 2022. No obstructing   right-sided ureteral calculus. Delayed enhancement of the left kidney   with multiple hyperdense foci in the renal pelvis, likely retained IV   contrast from prior day CT scan. Some retained contrast is also seen   within the ureter and bladder. No left hydronephrosis. Mild nonspecific   bilateral perinephric stranding.    BLADDER: Under distended bladder with wall thickening and trabeculation   with numerous small bladder diverticula. Contrast within the bladder   lumen.  REPRODUCTIVE ORGANS: Prostate is enlarged.    BOWEL: No bowel obstruction. Scattered colonic diverticulosis without   diverticulitis. Moderate fecal load in the rectosigmoid colon.   Appendectomy.  PERITONEUM: No ascites or pneumoperitoneum.  VESSELS: Atherosclerotic calcifications of the aortoiliac tree.  RETROPERITONEUM/LYMPH NODES: Numerous prominent subcentimeter   retroperitoneal lymph nodes similar to the prior exam  ABDOMINAL WALL: Mild generalized soft tissue edema. Nonspecific   infiltration of the right anterior abdominal wall subcutaneous fat,   unchanged.  BONES: Degenerative changes.    IMPRESSION:  Chronic severe right-sided hydroureteronephrosis to the level of the   ureteropelvic junction with marked right renal cortical thinning,   worsened compared with prior exam from 2022. No obstructing right   ureteral calculus.    Delayed left renal enhancement in the setting of recent contrast   administration. Correlate with renal function. No left-sided   hydronephrosis.    Under distended bladder with bladder wall thickening and multiple   diverticula in the setting of an enlarged prostate suggesting chronic   outlet obstruction.    Hepatosplenomegaly.    Redemonstrated small right pleural effusion.    < end of copied text >  < from: CT Angio Chest PE Protocol w/ IV Cont (23 @ 20:15) >  FINDINGS:    PULMONARY ARTERIES: No pulmonary embolism detected. Please note that   multiple segmental and subsegmental pulmonary artery branches are limited   in assessment secondary to motion.    MEDIASTINUM: Cardiomegaly. No pericardial effusion. Normal caliber   thoracic aorta. Few mildly enlarged mediastinal nodes appear similar   compared to 2022; reference prevascular node measuring 1.7 x 1.1 cm   node (image 52, series 2).    AIRWAYS, LUNGS, PLEURA: Minimal central airways secretions. Emphysema. No   focal consolidation. Small right pleural effusion.    IMAGED ABDOMEN: Severe right hydronephrosis with cortical thinning.    SOFT TISSUES: Subcutaneous ICD.    BONES: Unremarkable.      IMPRESSION:.    No pulmonary embolism detected. Please note that multiple segmental and   subsegmental pulmonary branches are limited in assessment secondary to   motion.    Small right pleural effusion.    Severe right hydronephrosis.    < end of copied text >                   Patient is a 62y old  Male who presents with a chief complaint of Hypotension requiring pressors in setting of hypoxia and fever. (03 Mar 2023 07:06)    HPI:  This is a 62 year old male with pmh of HFrEF with an EF of 19% status post AICD, CAD status post stents, DM 2 A1c 7.4%, HTN, COPD with a 30+ pack year history of smoking   presents to the ED with shortness of breath that started around 5pm after dinner.   Also vomited x3. 1x diarrhea.    As per EMS, patient was noted to be hypoxic to 85% on room air started on nasal cannula.    Noted to be hypotensive in the field and immediately brought to the ED for further evaluation.   Endorses chornic cough and feeling more fatigued than usual but otherwise denies any chest pain, abd pain, headaches, numbness/tingling.   Of note, patient has history of chronic ulcer of R 3rd foot and L foot 5th digit dorsal wound  In the ED, febrile to 38.2 C, started on 6L nasal cannula, saturating in mid 90s. Noted to be febrile. Systolic BP to be in 70s and 90s. 500 mL bolus given with no adequate response. Levo gtt started. MAP of 75 on levo 0.05. WBC elevated at 19k, Hgb of 12.8, lactate elevated at 2.3, Trop at 101 and BNP at 6243. No signs of UTI on the urinalysis. VBG with pH 7.36/41/45/23. Chest x ray clear. CT angio chest shows no evidence of PE detected, however showing small R pleural effusion and severe right hydronephrosis. CT abd/pelv w/o contrast pending.   s/p Empiric Cefepime 1g and Vanc 1g, Tylenol 1g.    (01 Mar 2023 22:08)     Feels better now, just very tired.   Denies pain except his shoulder from laying in the bed all the time.       REVIEW OF SYSTEMS  [  ] ROS unobtainable because:    [ x ] All other systems negative except as noted below    Constitutional:  [ ] fever [ ] chills  [ ] weight loss  [ ]night sweat  [ ]poor appetite/PO intake [ ]fatigue   Skin:  [ ] rash [ ] phlebitis	  Eyes: [ ] icterus [ ] pain  [ ] discharge	  ENMT: [ ] sore throat  [ ] thrush [ ] ulcers [ ] exudates [ ]anosmia  Respiratory: [ ] dyspnea [ ] hemoptysis [ ] cough [ ] sputum	  Cardiovascular:  [ ] chest pain [ ] palpitations [ ] edema	  Gastrointestinal:  [ ] nausea [ ] vomiting [ ] diarrhea [ ] constipation [ ] pain	  Genitourinary:  [ ] dysuria [ ] frequency [ ] hematuria [ ] discharge [ ] flank pain  [ ] incontinence  Musculoskeletal:  [ ] myalgias [ ] arthralgias [ ] arthritis  [ ] back pain  Neurological:  [ ] headache [ ] weakness [ ] seizures  [ ] confusion/altered mental status    prior hospital charts reviewed [V]  primary team notes reviewed [V]  other consultant notes reviewed [V]    PAST MEDICAL & SURGICAL HISTORY:  Stented coronary artery      Diabetes      AICD (automatic cardioverter/defibrillator) present      Hypertension      Heart failure with reduced ejection fraction      History of ischemic cardiomyopathy      History of COPD      H/O gastroesophageal reflux (GERD)      H/O vasectomy  20 yrs ago ()          SOCIAL HISTORY:  -former smoker       FAMILY HISTORY:  Family history of COPD (chronic obstructive pulmonary disease) (Sibling)    Family history of cardiac disorder  Paternal        Allergies  Zosyn (Pruritus)        ANTIMICROBIALS:  cefepime   IVPB 2000 every 12 hours  vancomycin  IVPB 1000 once      ANTIMICROBIALS (past 90 days):  MEDICATIONS  (STANDING):  cefepime   IVPB   100 mL/Hr IV Intermittent (23 @ 05:03)   100 mL/Hr IV Intermittent (23 @ 17:28)   100 mL/Hr IV Intermittent (23 @ 05:24)    cefepime   IVPB   100 mL/Hr IV Intermittent (23 @ 18:59)    vancomycin  IVPB.   250 mL/Hr IV Intermittent (23 @ 19:35)        OTHER MEDS:   MEDICATIONS  (STANDING):  acetaminophen     Tablet .. 650 every 6 hours PRN  acetaminophen  Suppository .. 650 once PRN  albuterol    90 MICROgram(s) HFA Inhaler 2 three times a day PRN  aspirin  chewable 81 daily  atorvastatin 80 at bedtime  clopidogrel Tablet 75 daily  dextrose 50% Injectable 25 every 15 minutes PRN  heparin   Injectable 5000 every 8 hours  insulin glargine Injectable (LANTUS) 30 at bedtime  insulin lispro (ADMELOG) corrective regimen sliding scale  Before meals and at bedtime  norepinephrine Infusion 0.037 <Continuous>  pantoprazole    Tablet 40 before breakfast  tamsulosin 0.4 at bedtime      VITALS:  Vital Signs Last 24 Hrs  T(F): 97.8 (23 @ 12:00), Max: 104.4 (23 @ 00:40)    Vital Signs Last 24 Hrs  HR: 98 (23 @ 12:00) (95 - 109)  BP: 129/65 (23 @ 12:00) (79/58 - 138/63)  RR: 21 (23 @ 12:00)  SpO2: 94% (23 @ 12:00) (89% - 100%)  Wt(kg): --    EXAM:    GA: NAD, AOx3  HEENT: no adenopathy   CV: nl S1/S2, no RMG  Lungs: CTAB, No distress  Abd: BS+, soft, nontender, no rebounding pain  Ext: no edema  Neuro: No focal deficits  Skin: right lower shin ulcer, superficial, associated hyperpigmentation, not hot or tender   IV: no phlebitis    Labs:                        11.9   9.10  )-----------( 97       ( 03 Mar 2023 00:52 )             39.5         128<L>  |  92<L>  |  85<H>  ----------------------------<  240<H>  3.8   |  19<L>  |  3.90<H>    Ca    8.5      03 Mar 2023 00:52  Phos  6.4       Mg     2.1         TPro  7.0  /  Alb  3.5  /  TBili  1.1  /  DBili  x   /  AST  118<H>  /  ALT  115<H>  /  AlkPhos  129<H>        WBC Trend:  WBC Count: 9.10 (23 @ 00:52)  WBC Count: 16.91 (23 @ 01:04)  WBC Count: 19.19 (23 @ 17:42)      Auto Neutrophil #: 15.44 K/uL (23 @ 01:04)  Auto Neutrophil #: 18.35 K/uL (23 @ 17:42)  Band Neutrophils %: 5.2 % (23 @ 17:42)  Auto Neutrophil #: 5.47 K/uL (22 @ 14:39)  Auto Neutrophil #: 4.69 K/uL (22 @ 13:45)      Creatine Trend:  Creatinine, Serum: 3.90 ()  Creatinine, Serum: 3.33 ()  Creatinine, Serum: 3.01 ()  Creatinine, Serum: 2.44 ()      Liver Biochemical Testing Trend:  Alanine Aminotransferase (ALT/SGPT): 115 *H* ()  Alanine Aminotransferase (ALT/SGPT): 41 ()  Alanine Aminotransferase (ALT/SGPT): 28 ()  Alanine Aminotransferase (ALT/SGPT): 18 ()  Alanine Aminotransferase (ALT/SGPT): 15 ()  Aspartate Aminotransferase (AST/SGOT): 118 (23 @ 00:52)  Aspartate Aminotransferase (AST/SGOT): 52 (23 @ 06:08)  Aspartate Aminotransferase (AST/SGOT): 35 (23 @ 01:04)  Aspartate Aminotransferase (AST/SGOT): 23 (23 @ 17:42)  Aspartate Aminotransferase (AST/SGOT): 16 (22 @ 19:24)  Bilirubin Total, Serum: 1.1 ()  Bilirubin Total, Serum: 1.3 ()  Bilirubin Total, Serum: 1.3 ()  Bilirubin Total, Serum: 1.2 ()  Bilirubin Total, Serum: 0.3 ()      Trend LDH      Auto Eosinophil %: 0.2 % (23 @ 01:04)  Auto Eosinophil %: 0.0 % (23 @ 17:42)      Urinalysis Basic - ( 01 Mar 2023 18:53 )    Color: Yellow / Appearance: Clear / S.022 / pH: x  Gluc: x / Ketone: Negative  / Bili: Small / Urobili: 3 mg/dL   Blood: x / Protein: 100 mg/dl / Nitrite: Negative   Leuk Esterase: Negative / RBC: 2 /hpf / WBC 1 /HPF   Sq Epi: x / Non Sq Epi: 0 / Bacteria: Negative        MICROBIOLOGY:  Vancomycin Level, Random: 5.2 ( @ 10:41)    MRSA PCR Result.: NotDetec (23 @ 19:27)  MRSA PCR Result.: NotDetec (22 @ 07:02)      Culture - Urine (collected 01 Mar 2023 18:53)  Source: Clean Catch Clean Catch (Midstream)  Final Report:    <10,000 CFU/mL Normal Urogenital Dorothy    Culture - Blood (collected 01 Mar 2023 17:24)  Source: .Blood Blood-Peripheral  Preliminary Report:    No growth to date.    Culture - Blood (collected 01 Mar 2023 17:24)  Source: .Blood Blood-Peripheral  Preliminary Report:    No growth to date.    Culture - Urine (collected 12 Dec 2022 16:28)  Source: Clean Catch Clean Catch (Midstream)  Final Report:    <10,000 CFU/mL Normal Urogenital Dorothy    Culture - Blood (collected 12 Dec 2022 14:15)  Source: .Blood Blood-Peripheral  Final Report:    No Growth Final    Culture - Blood (collected 12 Dec 2022 14:00)  Source: .Blood Blood-Peripheral  Final Report:    No Growth Final    Culture - Urine (collected 2022 21:00)  Source: Clean Catch Clean Catch (Midstream)  Final Report:    <10,000 CFU/mL Normal Urogenital Dorothy    Culture - Blood (collected 2022 19:28)  Source: .Blood Blood-Peripheral  Final Report:    No Growth Final    Culture - Blood (collected 2022 19:25)  Source: .Blood Blood-Peripheral  Final Report:    No Growth Final    Culture - Other (collected 2022 22:44)  Source: .Other  Final Report:    No growth at 48 hours    Procalcitonin, Serum: 10.24 ()      Ferritin, Serum: 225 ()        Serum Pro-Brain Natriuretic Peptide: 6243 ()    Troponin T, High Sensitivity Result: 101 ()    Blood Gas Venous - Lactate: 1.3 ( @ 00:48)  Blood Gas Venous - Lactate: 2.3 ( @ 06:03)  Blood Gas Venous - Lactate: 2.3 ( @ 00:57)  Blood Gas Venous - Lactate: 2.0 ( @ 19:38)    A1C with Estimated Average Glucose Result: 7.4 % (23 @ 01:04)  A1C with Estimated Average Glucose Result: 8.2 % (22 @ 06:30)  A1C with Estimated Average Glucose Result: 7.0 % (10-01-22 @ 06:04)    Sedimentation Rate, Erythrocyte: 92 mm/hr (22 @ 20:08)    CSF:    RADIOLOGY:  < from: CT Abdomen and Pelvis No Cont (23 @ 00:35) >  PROCEDURE:  CT of the Abdomen and Pelvis was performed.  Sagittal and coronal reformats were performed.    FINDINGS:  LOWER CHEST: Cardiomegaly. Small right pleural effusion with associated   compressive atelectasis. Partially visualized left chest wall battery   pack.    Evaluation of the abdominal and pelvic viscera and vasculature is limited   without the useof IV contrast    LIVER: Hepatomegaly measuring 24 cm in the craniocaudal dimension.   Borderline steatosis.  BILE DUCTS: Normal caliber.  GALLBLADDER: Within normal limits.  SPLEEN: Mild splenomegaly.  PANCREAS: Within normal limits.  ADRENALS: Within normal limits.  KIDNEYS/URETERS: Chronic severe right hydronephrosis to the level of the   ureteropelvic junction with marked thinning of the renal cortex and   interval worsening since prior CT scan 2022. No obstructing   right-sided ureteral calculus. Delayed enhancement of the left kidney   with multiple hyperdense foci in the renal pelvis, likely retained IV   contrast from prior day CT scan. Some retained contrast is also seen   within the ureter and bladder. No left hydronephrosis. Mild nonspecific   bilateral perinephric stranding.    BLADDER: Under distended bladder with wall thickening and trabeculation   with numerous small bladder diverticula. Contrast within the bladder   lumen.  REPRODUCTIVE ORGANS: Prostate is enlarged.    BOWEL: No bowel obstruction. Scattered colonic diverticulosis without   diverticulitis. Moderate fecal load in the rectosigmoid colon.   Appendectomy.  PERITONEUM: No ascites or pneumoperitoneum.  VESSELS: Atherosclerotic calcifications of the aortoiliac tree.  RETROPERITONEUM/LYMPH NODES: Numerous prominent subcentimeter   retroperitoneal lymph nodes similar to the prior exam  ABDOMINAL WALL: Mild generalized soft tissue edema. Nonspecific   infiltration of the right anterior abdominal wall subcutaneous fat,   unchanged.  BONES: Degenerative changes.    IMPRESSION:  Chronic severe right-sided hydroureteronephrosis to the level of the   ureteropelvic junction with marked right renal cortical thinning,   worsened compared with prior exam from 2022. No obstructing right   ureteral calculus.    Delayed left renal enhancement in the setting of recent contrast   administration. Correlate with renal function. No left-sided   hydronephrosis.    Under distended bladder with bladder wall thickening and multiple   diverticula in the setting of an enlarged prostate suggesting chronic   outlet obstruction.    Hepatosplenomegaly.    Redemonstrated small right pleural effusion.    < end of copied text >  < from: CT Angio Chest PE Protocol w/ IV Cont (23 @ 20:15) >  FINDINGS:    PULMONARY ARTERIES: No pulmonary embolism detected. Please note that   multiple segmental and subsegmental pulmonary artery branches are limited   in assessment secondary to motion.    MEDIASTINUM: Cardiomegaly. No pericardial effusion. Normal caliber   thoracic aorta. Few mildly enlarged mediastinal nodes appear similar   compared to 2022; reference prevascular node measuring 1.7 x 1.1 cm   node (image 52, series 2).    AIRWAYS, LUNGS, PLEURA: Minimal central airways secretions. Emphysema. No   focal consolidation. Small right pleural effusion.    IMAGED ABDOMEN: Severe right hydronephrosis with cortical thinning.    SOFT TISSUES: Subcutaneous ICD.    BONES: Unremarkable.      IMPRESSION:.    No pulmonary embolism detected. Please note that multiple segmental and   subsegmental pulmonary branches are limited in assessment secondary to   motion.    Small right pleural effusion.    Severe right hydronephrosis.    < end of copied text >

## 2023-03-03 NOTE — PROGRESS NOTE ADULT - SUBJECTIVE AND OBJECTIVE BOX
[facility]  Lul Cruz  Office: [phone]  Cell: [phone]  Can also be reached on [employer] Teams      INTERVAL HPI/OVERNIGHT EVENTS: Seen and examined sitting up in chair after having used the restroom. Mostly off pressors now. Feeling a little "fuzzy", malaise. Offers no SOB or chest pain. Had episode of fever this AM, no chills. Felt a little dizzy while using the restroom, required some assistance.       REVIEW OF SYSTEMS:  Negative except per HPI.     Vital Signs Last 24 Hrs  T(C): 36.6 (03 Mar 2023 12:00), Max: 39.2 (02 Mar 2023 14:00)  T(F): 97.8 (03 Mar 2023 12:00), Max: 102.6 (02 Mar 2023 14:00)  HR: 98 (03 Mar 2023 12:00) (95 - 109)  BP: 129/65 (03 Mar 2023 12:00) (79/58 - 138/63)  BP(mean): 91 (03 Mar 2023 12:00) (61 - 95)  RR: 21 (03 Mar 2023 12:00) (5 - 33)  SpO2: 94% (03 Mar 2023 12:00) (89% - 100%)    Parameters below as of 03 Mar 2023 08:00  Patient On (Oxygen Delivery Method): nasal cannula  O2 Flow (L/min): 1      PHYSICAL EXAMINATION:  GENERAL: NAD, AAOx4 to person place time situation, no confusion  HEAD:  Atraumatic, Normocephalic  EYES:  conjunctiva and sclera clear  NECK: Supple, Normal thyroid  CHEST/LUNG: Clear to auscultation. no wheezing, diminished breath sounds  HEART: Regular tachycardia  ABDOMEN: Soft, Nontender, Ndistended, bowel sounds present  NERVOUS SYSTEM:  Alert & Oriented X3,  moving extremities spontaneously, no focal deficits  EXTREMITIES:  2+ Peripheral Pulses, No pitting edema, has an ulcer on the right leg, ulcer appears to be healing,   SKIN: warm dry                          11.9   9.10  )-----------( 97       ( 03 Mar 2023 00:52 )             39.5     03-03    128<L>  |  92<L>  |  85<H>  ----------------------------<  240<H>  3.8   |  19<L>  |  3.90<H>    Ca    8.5      03 Mar 2023 00:52  Phos  6.4     03-03  Mg     2.1     03-03    TPro  7.0  /  Alb  3.5  /  TBili  1.1  /  DBili  x   /  AST  118<H>  /  ALT  115<H>  /  AlkPhos  129<H>  03-03    LIVER FUNCTIONS - ( 03 Mar 2023 00:52 )  Alb: 3.5 g/dL / Pro: 7.0 g/dL / ALK PHOS: 129 U/L / ALT: 115 U/L / AST: 118 U/L / GGT: x               PT/INR - ( 03 Mar 2023 00:52 )   PT: 18.1 sec;   INR: 1.57 ratio         PTT - ( 03 Mar 2023 00:52 )  PTT:33.1 sec    CAPILLARY BLOOD GLUCOSE      RADIOLOGY & ADDITIONAL TESTS:

## 2023-03-03 NOTE — PROGRESS NOTE ADULT - ATTENDING COMMENTS
61 y/o M with a PMH of HFrEF s/p AICD and chronic kidney disease. Pt followed by HF as well as cardio and nephro. Pt is being followed by wound care for foot wounds and wife does mention a wound on the right shin and states he has become septic from wounds in the past. States that last night started to have fever and looked unwell. He continued to have fevers and had a few episodes of vomiting and decreased urine from his baseline. In the ED pt received bolus however judicious in nature due to extensive hx.  Shock state is likely septic and hypovolemic in nature, now off pressors with MAP>65.  Cultures are NGTD and will cont empiric antibiotics. IVC appears enlarged this AM, will give a dose of Bumex. Patient examined and case reviewed in detail on bedside rounds. Frequent bedside visits with therapy change today.  Prognosis guarded.

## 2023-03-03 NOTE — PROGRESS NOTE ADULT - ASSESSMENT
This is a 62 year old male with pmh of HFrEF with an EF of 19% status post AICD, CAD status post stents, DM 2, HTN, COPD with a 30+ pack year history of smoking presents to the ED with shortness of breath, vomiting, diarrhea, hypoxia to mid 80s without NC --> 99% on 1L NC, hypotension requiring pressors in setting of fever admitted to MICU for further management.     --------------------------------------------------    Neuro  - A&O x4  - Not on any sedation    ------------------------------------------------------    Cardiovascular  # septic vs hypovolemic shock in setting of HFrEF of 19% s/p AICD  - 3 episode of vomiting, 1 episode of watery diarrhea in setting of poor PO intake, concerning for possible hypovolemic shock.   - On levo gtt, wean as tolerated.   - EKG shows sinus tachycardia  - POCUS shows severe LV systolic dysfunction with plethoric IVC, scattered B lines throughout  - BNP elevated 6243. Trop elevated at 101 likely in setting of demand from septic shock.   - f/u TTE     #HTN  - At home takes, Bumex 2mg tid, Entresto 24/26 BID, Metoprolol  qD, Spironolactone 25mg qD.  - Will hold home meds in setting of hypotension      #CAD   -  HFrEF of 19% s/p AICD, GDMT  - Cont Plavix and aspirin.     -----------------------------------------------------------    Respiratory  # hypoxia in setting of poor perfusion  - on Nasal Cannula 1L, saturating above 98%, wean as tolerated.   - Not acidotic or alkalotic on VBG. Will monitor  - CT angio chest showing no evidence of PE although limited by motion. Small right pleural effusion.   - Not concerning for volume overload picture at this time    ------------------------------------------------------------------    GI  # Nausea, vomiting, diarrhea  - Zofran as needed  - Will monitor symptoms and electrolytes. Replete as needed    # Severe R hydronephrosis seen on CT chest  - Hx of R hydronephrosis in 2020, currently presentation likely chronic.   - Pending CT abd/pelv w/o contrast to further evaluate    -----------------------------------------------------------------------    Renal  #ATN in setting of shock  - Creatinine elevated > 3 (baseline 1.5 to 1.8)   - not retaining on bladder scan   - given albumin (repeat POCUS with reduced IVC) --> increased output 300cc  - Will monitor and trend. Avoid nephrotoxins and renally dose meds.     --------------------------------------------------------------------------    Endo  #T2DM  -  40 units of Lantus p.m. at home  - started 30U lantus + ISS     ----------------------------------------------------------------------    Heme  - No active issues  - DVT PPX: Lovenox 40 qD.     ---------------------------------------------------------------------    ID  #shock 2/2 likely sepsis with unclear etiology.  - Febrile. WBC elevated to 19k.  - s/p Cefepime and Vanc in the ED.  - Cont with cefepime and dose vanc by level   - f/u MRSA  - F/u BCx x2  - No clear etiology as of now.  - Urinalysis negative, chronic R hydronephrosis. CT abd/pelv pending  - CT chest with R pleural effusion, with no signs of pneumonia.     ------------------------------------------------------------------------  Ethics  - Full code    Diet   - Regular, consistent carb diet.  This is a 62 year old male with pmh of HFrEF with an EF of 19% status post AICD, CAD status post stents, DM 2, HTN, COPD with a 30+ pack year history of smoking presents to the ED with shortness of breath, vomiting, diarrhea, hypoxia to mid 80s without NC --> 99% on 1L NC, hypotension requiring pressors in setting of fever admitted to MICU for further management. Now off levo, will monitor BPs for now.     --------------------------------------------------    Neuro  - A&O x4  - Not on any sedation    ------------------------------------------------------    Cardiovascular  # septic vs hypovolemic shock in setting of HFrEF of 19% s/p AICD  - 3 episode of vomiting, 1 episode of watery diarrhea in setting of poor PO intake, concerning for possible hypovolemic shock.   - On levo gtt, wean as tolerated.   - EKG shows sinus tachycardia  - POCUS shows severe LV systolic dysfunction with plethoric IVC, scattered B lines throughout  - BNP elevated 6243. Trop elevated at 101 likely in setting of demand from septic shock.   - TTE with EF 25% and severe diastolic dysfunction. No valvular pathologies   - No LE pitting edema, per POCUS today does have dilated IVC   - will give Bumex 4mg IV    #HTN  - At home takes, Bumex 2mg tid, Entresto 24/26 BID, Metoprolol  qD, Spironolactone 25mg qD.  - Holding anti-hypertensives for soft BPs, goal systolic > 90   - off levo support       #CAD   -  HFrEF of 19% s/p AICD, GDMT  - Cont Plavix and aspirin.     -----------------------------------------------------------    Respiratory  # hypoxia in setting of poor perfusion  - on Nasal Cannula 1L, saturating above 98%, wean as tolerated.   - Not acidotic or alkalotic on VBG. Will monitor  - CT angio chest showing no evidence of PE although limited by motion. Small right pleural effusion.   - satting in the 90s on RA    ------------------------------------------------------------------    GI  # Nausea, vomiting, diarrhea  - Zofran as needed  - Will monitor symptoms and electrolytes. Replete as needed    # Severe R hydronephrosis seen on CT chest  - Hx of R hydronephrosis in 2020, currently presentation likely chronic.   - CT abd/pelv w/o contrast with hydro, no sign of abscess/infection     #Transaminitis   Elevated alk phos, LFTs   Likely i/s/o congestive hepatopathy, however, did have episode of abd pain last night   - follow up abdominal ultrasound     -----------------------------------------------------------------------    Renal  #ATN vs worsening cardiorenal  - Creatinine uptrending 3.9 (baseline 1.5 to 1.8)   - not retaining on bladder scan   - given albumin x2 (repeat POCUS with reduced IVC) --> increased urine output 300cc  - this AM IVC 2.2, given 4mg bumex  - monitor urine output   - Will monitor and trend. Avoid nephrotoxins and renally dose meds.     --------------------------------------------------------------------------    Endo  #T2DM  -  40 units of Lantus p.m. at home  - c/w 30U lantus + ISS     ----------------------------------------------------------------------    Heme  - No active issues  - DVT PPX: Lovenox 40 qD.     ---------------------------------------------------------------------    ID  #shock 2/2 likely sepsis with unclear etiology.  - Febrile. WBC elevated to 19k.  - s/p Cefepime and Vanc in the ED.  - Staph positive, MRSA neg   - BC 3/1 NGTD   - No clear etiology as of now.  - Urinalysis negative, chronic R hydronephrosis. CT abd/pelv without infectious source  - TTE without clear endocarditis   - CT chest with R pleural effusion, with no signs of pneumonia.   - Cont with cefepime and dose vanc by level with decreased CrCl    ------------------------------------------------------------------------  Ethics  - Full code    Diet   - Regular, consistent carb diet.

## 2023-03-03 NOTE — PHYSICAL THERAPY INITIAL EVALUATION ADULT - NSPTDISCHREC_GEN_A_CORE
Progress to home/Home PT Progress to home; If pt d/c home would require home PT, assist with all mobility/ADLs, & DME: Patient will require a rolling walker for safe ambulation due to decreased balance for discharge./Sub-acute Rehab

## 2023-03-03 NOTE — PHYSICAL THERAPY INITIAL EVALUATION ADULT - PERTINENT HX OF CURRENT PROBLEM, REHAB EVAL
62 year old male with pmh of HFrEF with an EF of 19% status post AICD, CAD status post stents, DM 2, HTN, COPD with a 30+ pack year history of smoking presents to the ED with shortness of breath that started around 5pm after dinner. Also vomited x3. 1x diarrhea.  As per EMS, patient was noted to be hypoxic to 85% on room air started on nasal cannula.  Noted to be hypotensive in the field and immediately brought to the ED for further evaluation. Endorses chornic cough and feeling more fatigued than usual but otherwise denies any chest pain, abd pain, headaches, numbness/tingling. Of note, patient has history of chronic ulcer of R 3rd foot and L foot 5th digit dorsal wound. In the ED, febrile to 38.2 C, started on 6L nasal cannula, saturating in mid 90s. Noted to be febrile. Systolic BP to be in 70s and 90s. 500 mL bolus given with no adequate response. Levo gtt started. MAP of 75 on levo 0.05. WBC elevated at 19k, Hgb of 12.8, lactate elevated at 2.3, Trop at 101 and BNP at 6243. No signs of UTI on the urinalysis. VBG with pH 7.36/41/45/23. s/p Empiric Cefepime 1g and Vanc 1g, Tylenol 1g.  3/1 CXR: (-) CTA Chest: No pulmonary embolism detected. Please note that multiple segmental and   subsegmental pulmonary branches are limited in assessment secondary to motion. Small right pleural effusion. Severe right hydronephrosis. 3/1 POCUS ED Chest: No clinically significant pericardial effusion.  A right sided pleural effusion was visualized. There were 1-2 B-lines to the bilateral lung fields, predominately to the bases and anterior fields. 3/1 CT A/P Chronic severe right-sided hydroureteronephrosis to the level of the ureteropelvic junction with marked right renal cortical thinning, worsened compared with prior exam from 7/22/2022. No obstructing right ureteral calculus. Delayed left renal enhancement in the setting of recent contrast administration. Correlate with renal function. No left-sided hydronephrosis. Under distended bladder with bladder wall thickening and multiple diverticula in the setting of an enlarged prostate suggesting chronic outlet obstruction. Hepatosplenomegaly. Redemonstrated small right pleural effusion.

## 2023-03-03 NOTE — PHYSICAL THERAPY INITIAL EVALUATION ADULT - CRITERIA FOR SKILLED THERAPEUTIC INTERVENTIONS
impairments found/anticipated equipment needs at discharge/anticipated discharge recommendation impairments found/anticipated discharge recommendation

## 2023-03-03 NOTE — PROGRESS NOTE ADULT - SUBJECTIVE AND OBJECTIVE BOX
INTERVAL HPI/OVERNIGHT EVENTS:    SUBJECTIVE: Patient seen and examined at bedside.     CONSTITUTIONAL: No weakness, fevers or chills  EYES/ENT: No visual changes;  No vertigo or throat pain   NECK: No pain or stiffness  RESPIRATORY: No cough, wheezing, hemoptysis; No shortness of breath  CARDIOVASCULAR: No chest pain or palpitations  GASTROINTESTINAL: No abdominal or epigastric pain. No nausea, vomiting, or hematemesis; No diarrhea or constipation. No melena or hematochezia.  GENITOURINARY: No dysuria, frequency or hematuria  NEUROLOGICAL: No numbness or weakness  SKIN: No itching, rashes    OBJECTIVE:    VITAL SIGNS:  ICU Vital Signs Last 24 Hrs  T(C): 38 (03 Mar 2023 06:00), Max: 39.4 (02 Mar 2023 08:00)  T(F): 100.4 (03 Mar 2023 06:00), Max: 102.9 (02 Mar 2023 08:00)  HR: 104 (03 Mar 2023 06:45) (90 - 109)  BP: 93/62 (03 Mar 2023 06:45) (78/49 - 131/72)  BP(mean): 72 (03 Mar 2023 06:45) (56 - 95)  ABP: --  ABP(mean): --  RR: 22 (03 Mar 2023 06:45) (5 - 33)  SpO2: 95% (03 Mar 2023 06:45) (90% - 100%)    O2 Parameters below as of 03 Mar 2023 07:00    O2 Flow (L/min): 1            - @ 07:01  -  03-03 @ 07:00  --------------------------------------------------------  IN: 1553.7 mL / OUT: 570 mL / NET: 983.7 mL      CAPILLARY BLOOD GLUCOSE      POCT Blood Glucose.: 178 mg/dL (02 Mar 2023 21:00)      PHYSICAL EXAM:    PHYSICAL EXAM:    General: NAD, Speaking on full sentences on Nasal cannula.   HEENT: NCAT.   Cardiac: RRR, warm extremities.   Chest: CTA, no crackles/wheezing bilaterally.   Abdomen: soft, non-distended, no ttp, no rebound or guarding. No CVA tenderness bilaterally.   Extremities: + bilateral peripheral pitting edema, No calf tenderness, No leg size discrepancies  Skin: no rashes  Neuro: AAOx3, motor and sensory grossly intact.   Psych: mood and affect appropriate    MEDICATIONS:  MEDICATIONS  (STANDING):  aspirin  chewable 81 milliGRAM(s) Oral daily  atorvastatin 80 milliGRAM(s) Oral at bedtime  cefepime   IVPB 2000 milliGRAM(s) IV Intermittent every 12 hours  chlorhexidine 4% Liquid 1 Application(s) Topical <User Schedule>  clopidogrel Tablet 75 milliGRAM(s) Oral daily  heparin   Injectable 5000 Unit(s) SubCutaneous every 8 hours  insulin glargine Injectable (LANTUS) 30 Unit(s) SubCutaneous at bedtime  insulin lispro (ADMELOG) corrective regimen sliding scale   SubCutaneous Before meals and at bedtime  norepinephrine Infusion 0.037 MICROgram(s)/kG/Min (7 mL/Hr) IV Continuous <Continuous>  pantoprazole    Tablet 40 milliGRAM(s) Oral before breakfast  tamsulosin 0.4 milliGRAM(s) Oral at bedtime    MEDICATIONS  (PRN):  acetaminophen     Tablet .. 650 milliGRAM(s) Oral every 6 hours PRN Temp greater or equal to 38C (100.4F), Mild Pain (1 - 3)  acetaminophen  Suppository .. 650 milliGRAM(s) Rectal once PRN Temp greater or equal to 38C (100.4F)  albuterol    90 MICROgram(s) HFA Inhaler 2 Puff(s) Inhalation three times a day PRN Shortness of Breath and/or Wheezing  dextrose 50% Injectable 25 Gram(s) IV Push every 15 minutes PRN blood glucose <70      ALLERGIES:  Allergies    Zosyn (Pruritus)    Intolerances        LABS:                        11.9   9.10  )-----------( 97       ( 03 Mar 2023 00:52 )             39.5     03-03    128<L>  |  92<L>  |  85<H>  ----------------------------<  240<H>  3.8   |  19<L>  |  3.90<H>    Ca    8.5      03 Mar 2023 00:52  Phos  6.4     03-03  Mg     2.1     03-03    TPro  7.0  /  Alb  3.5  /  TBili  1.1  /  DBili  x   /  AST  118<H>  /  ALT  115<H>  /  AlkPhos  129<H>  -    PT/INR - ( 03 Mar 2023 00:52 )   PT: 18.1 sec;   INR: 1.57 ratio         PTT - ( 03 Mar 2023 00:52 )  PTT:33.1 sec  Urinalysis Basic - ( 01 Mar 2023 18:53 )    Color: Yellow / Appearance: Clear / S.022 / pH: x  Gluc: x / Ketone: Negative  / Bili: Small / Urobili: 3 mg/dL   Blood: x / Protein: 100 mg/dl / Nitrite: Negative   Leuk Esterase: Negative / RBC: 2 /hpf / WBC 1 /HPF   Sq Epi: x / Non Sq Epi: 0 / Bacteria: Negative        RADIOLOGY & ADDITIONAL TESTS: Reviewed. INTERVAL HPI/OVERNIGHT EVENTS:    SUBJECTIVE: Patient seen and examined at bedside. Patient denies acute complaints at this time. No SOB, cough, dysuria, back pain, new rashes, palpitations, CP. Still having continuous fevers but was titrated off levo. Satting in the low 90s on RA.     CONSTITUTIONAL: No weakness, +fevers   EYES/ENT: No visual changes;  No vertigo or throat pain   NECK: No pain or stiffness  RESPIRATORY: No cough, wheezing, hemoptysis; No shortness of breath  CARDIOVASCULAR: No chest pain or palpitations  GASTROINTESTINAL: No abdominal or epigastric pain. No nausea, vomiting, or hematemesis; No diarrhea or constipation. No melena or hematochezia.  GENITOURINARY: No dysuria, frequency or hematuria  NEUROLOGICAL: No numbness or weakness  SKIN: No itching, rashes    OBJECTIVE:    VITAL SIGNS:  ICU Vital Signs Last 24 Hrs  T(C): 38 (03 Mar 2023 06:00), Max: 39.4 (02 Mar 2023 08:00)  T(F): 100.4 (03 Mar 2023 06:00), Max: 102.9 (02 Mar 2023 08:00)  HR: 104 (03 Mar 2023 06:45) (90 - 109)  BP: 93/62 (03 Mar 2023 06:45) (78/49 - 131/72)  BP(mean): 72 (03 Mar 2023 06:45) (56 - 95)  ABP: --  ABP(mean): --  RR: 22 (03 Mar 2023 06:45) (5 - 33)  SpO2: 95% (03 Mar 2023 06:45) (90% - 100%)    O2 Parameters below as of 03 Mar 2023 07:00    O2 Flow (L/min): 1             @ 07:01  -  - @ 07:00  --------------------------------------------------------  IN: 1553.7 mL / OUT: 570 mL / NET: 983.7 mL      CAPILLARY BLOOD GLUCOSE      POCT Blood Glucose.: 178 mg/dL (02 Mar 2023 21:00)      PHYSICAL EXAM:    PHYSICAL EXAM:    General: NAD, Speaking on full sentences on Nasal cannula.   HEENT: NCAT.   Cardiac: RRR, warm extremities.   Chest: CTA, no crackles/wheezing bilaterally.   Abdomen: soft, non-distended, no ttp, no rebound or guarding. No CVA tenderness bilaterally.   Extremities: + bilateral peripheral pitting edema, No calf tenderness, No leg size discrepancies  Skin: no rashes, does have kaykay erythema in the RLE with scabbing ulcer, no drainage or purulence. No warmth   MSK: No focal spinal tenderness   Neuro: AAOx3, motor and sensory grossly intact.   Psych: mood and affect appropriate    MEDICATIONS:  MEDICATIONS  (STANDING):  aspirin  chewable 81 milliGRAM(s) Oral daily  atorvastatin 80 milliGRAM(s) Oral at bedtime  cefepime   IVPB 2000 milliGRAM(s) IV Intermittent every 12 hours  chlorhexidine 4% Liquid 1 Application(s) Topical <User Schedule>  clopidogrel Tablet 75 milliGRAM(s) Oral daily  heparin   Injectable 5000 Unit(s) SubCutaneous every 8 hours  insulin glargine Injectable (LANTUS) 30 Unit(s) SubCutaneous at bedtime  insulin lispro (ADMELOG) corrective regimen sliding scale   SubCutaneous Before meals and at bedtime  norepinephrine Infusion 0.037 MICROgram(s)/kG/Min (7 mL/Hr) IV Continuous <Continuous>  pantoprazole    Tablet 40 milliGRAM(s) Oral before breakfast  tamsulosin 0.4 milliGRAM(s) Oral at bedtime    MEDICATIONS  (PRN):  acetaminophen     Tablet .. 650 milliGRAM(s) Oral every 6 hours PRN Temp greater or equal to 38C (100.4F), Mild Pain (1 - 3)  acetaminophen  Suppository .. 650 milliGRAM(s) Rectal once PRN Temp greater or equal to 38C (100.4F)  albuterol    90 MICROgram(s) HFA Inhaler 2 Puff(s) Inhalation three times a day PRN Shortness of Breath and/or Wheezing  dextrose 50% Injectable 25 Gram(s) IV Push every 15 minutes PRN blood glucose <70      ALLERGIES:  Allergies    Zosyn (Pruritus)    Intolerances        LABS:                        11.9   9.10  )-----------( 97       ( 03 Mar 2023 00:52 )             39.5     03-03    128<L>  |  92<L>  |  85<H>  ----------------------------<  240<H>  3.8   |  19<L>  |  3.90<H>    Ca    8.5      03 Mar 2023 00:52  Phos  6.4       Mg     2.1         TPro  7.0  /  Alb  3.5  /  TBili  1.1  /  DBili  x   /  AST  118<H>  /  ALT  115<H>  /  AlkPhos  129<H>      PT/INR - ( 03 Mar 2023 00:52 )   PT: 18.1 sec;   INR: 1.57 ratio         PTT - ( 03 Mar 2023 00:52 )  PTT:33.1 sec  Urinalysis Basic - ( 01 Mar 2023 18:53 )    Color: Yellow / Appearance: Clear / S.022 / pH: x  Gluc: x / Ketone: Negative  / Bili: Small / Urobili: 3 mg/dL   Blood: x / Protein: 100 mg/dl / Nitrite: Negative   Leuk Esterase: Negative / RBC: 2 /hpf / WBC 1 /HPF   Sq Epi: x / Non Sq Epi: 0 / Bacteria: Negative        RADIOLOGY & ADDITIONAL TESTS: Reviewed.

## 2023-03-03 NOTE — PROGRESS NOTE ADULT - SUBJECTIVE AND OBJECTIVE BOX
[facility]   [phone]  RENAL FOLLOW UP NOTE  --------------------------------------------------------------------------------  Chief Complaint:    24 hour events/subjective:    seen earlier   on O2   denied worsening sob     PAST HISTORY  --------------------------------------------------------------------------------  No significant changes to PMH, PSH, FHx, SHx, unless otherwise noted    ALLERGIES & MEDICATIONS  --------------------------------------------------------------------------------  Allergies    Zosyn (Pruritus)    Intolerances      Standing Inpatient Medications  aspirin  chewable 81 milliGRAM(s) Oral daily  atorvastatin 80 milliGRAM(s) Oral at bedtime  cefTRIAXone   IVPB 2000 milliGRAM(s) IV Intermittent every 24 hours  chlorhexidine 4% Liquid 1 Application(s) Topical <User Schedule>  clopidogrel Tablet 75 milliGRAM(s) Oral daily  heparin   Injectable 5000 Unit(s) SubCutaneous every 8 hours  insulin glargine Injectable (LANTUS) 35 Unit(s) SubCutaneous at bedtime  insulin lispro (ADMELOG) corrective regimen sliding scale   SubCutaneous Before meals and at bedtime  nicotine -   7 mG/24Hr(s) Patch 1 Patch Transdermal daily  norepinephrine Infusion 0.037 MICROgram(s)/kG/Min IV Continuous <Continuous>  pantoprazole    Tablet 40 milliGRAM(s) Oral before breakfast  tamsulosin 0.4 milliGRAM(s) Oral at bedtime    PRN Inpatient Medications  acetaminophen     Tablet .. 650 milliGRAM(s) Oral every 6 hours PRN  acetaminophen  Suppository .. 650 milliGRAM(s) Rectal once PRN  albuterol    90 MICROgram(s) HFA Inhaler 2 Puff(s) Inhalation three times a day PRN  dextrose 50% Injectable 25 Gram(s) IV Push every 15 minutes PRN      REVIEW OF SYSTEMS  --------------------------------------------------------------------------------    Gen: denies  /chills,  CVS: denies chest pain/palpitations  Resp:  + SOB/Cough  GI: Denies N/V/Abd pain  : Denies dysuria    VITALS/PHYSICAL EXAM  --------------------------------------------------------------------------------  T(C): 37 (03-03-23 @ 20:00), Max: 38.9 (03-02-23 @ 22:00)  HR: 99 (03-03-23 @ 19:00) (95 - 109)  BP: 81/59 (03-03-23 @ 19:00) (79/58 - 138/63)  RR: 24 (03-03-23 @ 19:00) (9 - 27)  SpO2: 95% (03-03-23 @ 19:00) (89% - 99%)  Wt(kg): --  Height (cm): 175.3 (03-02-23 @ 00:40)  Weight (kg): 118.9 (03-02-23 @ 00:40)  BMI (kg/m2): 38.7 (03-02-23 @ 00:40)  BSA (m2): 2.32 (03-02-23 @ 00:40)      03-02-23 @ 07:01  -  03-03-23 @ 07:00  --------------------------------------------------------  IN: 1553.7 mL / OUT: 570 mL / NET: 983.7 mL    03-03-23 @ 07:01  -  03-03-23 @ 20:32  --------------------------------------------------------  IN: 350 mL / OUT: 250 mL / NET: 100 mL      Physical Exam:  	    	Gen: ill appearing,   	Pulm: decrease bs  no rales or ronchi or wheezing  	CV: no JVD. RRR, S1S2; no rub  	Back: no sacral edema  	Abd: +BS, soft, nontender/nondistended, obese  	UE: Warm, no cyanosis  no clubbing,  no edema;   	LE: Warm, no cyanosis  no clubbing, no edema, RLE erhythema + skin ulcer present on admission as well   	Neuro: alert and oriented.  	Skin: Warm, no decrease skin turgor       LABS/STUDIES  --------------------------------------------------------------------------------              11.9   9.10  >-----------<  97       [03-03-23 @ 00:52]              39.5     131  |  92  |  94  ----------------------------<  237      [03-03-23 @ 17:07]  3.8   |  20  |  4.65        Ca     8.3     [03-03-23 @ 17:07]      Mg     2.3     [03-03-23 @ 17:07]      Phos  7.4     [03-03-23 @ 17:07]    TPro  7.2  /  Alb  3.5  /  TBili  1.1  /  DBili  x   /  AST  68  /  ALT  99  /  AlkPhos  157  [03-03-23 @ 17:07]    PT/INR: PT 18.1 , INR 1.57       [03-03-23 @ 00:52]  PTT: 33.1       [03-03-23 @ 00:52]      Creatinine Trend:  SCr 4.65 [03-03 @ 17:07]  SCr 3.90 [03-03 @ 00:52]  SCr 3.33 [03-02 @ 06:08]  SCr 3.01 [03-02 @ 01:04]  SCr 2.44 [03-01 @ 17:42]              Urinalysis - [03-01-23 @ 18:53]      Color Yellow / Appearance Clear / SG 1.022 / pH 6.0      Gluc 500 mg/dL / Ketone Negative  / Bili Small / Urobili 3 mg/dL       Blood Negative / Protein 100 mg/dl / Leuk Est Negative / Nitrite Negative      RBC 2 / WBC 1 / Hyaline 0 / Gran  / Sq Epi  / Non Sq Epi 0 / Bacteria Negative      Iron 14, TIBC --, %sat --      [03-02-23 @ 01:20]  Ferritin 225      [03-02-23 @ 01:18]  PTH -- (Ca 8.9)      [10-02-22 @ 07:04]   73  HbA1c 8.9      [12-16-19 @ 08:15]  TSH 3.02      [09-30-22 @ 13:45]

## 2023-03-03 NOTE — CONSULT NOTE ADULT - ASSESSMENT
This is a 62 year old male with pmh of HFrEF with an EF of 19% status post AICD, CAD status post stents, DM 2, HTN, COPD with a 30+ pack year history of smoking presents to the ED with shortness of breath that started around 5pm after dinner. Also vomited x3. 1x diarrhea.    In the ED, febrile to 38.2 C, started on 6L nasal cannula, saturating in mid 90s. Noted to be febrile. Systolic BP to be in 70s and 90s. 500 mL bolus given with no adequate response. Levo gtt started. MAP of 75 on levo 0.05. WBC elevated at 19k, Hgb of 12.8, lactate elevated at 2.3, Trop at 101 and BNP at 6243. No signs of UTI on the urinalysis. VBG with pH 7.36/41/45/23. Chest x ray clear. CT angio chest shows no evidence of PE detected, however showing small R pleural effusion and severe right hydronephrosis.  This is a 62 year old male with pmh of HFrEF with an EF of 19% status post AICD, CAD status post stents, DM 2, HTN, COPD with a 30+ pack year history of smoking presents to the ED with shortness of breath that started around 5pm after dinner. Also vomited x3. 1x diarrhea.    In the ED, febrile to 38.2 C, started on 6L nasal cannula, saturating in mid 90s. Noted to be febrile. Systolic BP to be in 70s and 90s. 500 mL bolus given with no adequate response. Levo gtt started. MAP of 75 on levo 0.05. WBC elevated at 19k, Hgb of 12.8, lactate elevated at 2.3, Trop at 101 and BNP at 6243. No signs of UTI on the urinalysis. VBG with pH 7.36/41/45/23. Chest x ray clear. CT angio chest shows no evidence of PE detected, however showing small R pleural effusion and severe right hydronephrosis.      This is a 62 year old male with pmh of HFrEF with an EF of 19% status post AICD, CAD status post stents, DM 2, HTN, COPD with a 30+ pack year history of smoking presents to the ED with shortness of breath that started around 5pm after dinner. Also vomited x3. 1x diarrhea.    In the ED, febrile to 38.2 C, started on 6L nasal cannula, saturating in mid 90s. Noted to be febrile. Systolic BP to be in 70s and 90s. 500 mL bolus given with no adequate response. Levo gtt started. MAP of 75 on levo 0.05. WBC elevated at 19k, Hgb of 12.8, lactate elevated at 2.3, Trop at 101 and BNP at 6243. No signs of UTI on the urinalysis. VBG with pH 7.36/41/45/23. Chest x ray clear. CT angio chest shows no evidence of PE detected, however showing small R pleural effusion and severe right hydronephrosis.     Currently Afebrile now but with persistent low grade fevers  white count has normalized  Chronically thrombocytopenic worsened today at 97  Worsening kidney function  Mild transaminase, total bili wnl today  Ct imaging with rightsided hydro worsening since 7/2022  u/a and urine cx neg  blood cx negx2  cxr clear   No PE from CTA-stable chronic right pleural effusion  Was on Cefepime    Plan  No identifiable source of infection  Pt appears non toxic  Recommend change cefepime to ceftriaxone 2gram bid  Await US abd results  Continue to trend WBC  Monitor fever curve  Plan d/w Dr. Echeverria And MICU team.   62M EF 19%, AICD, CAD s/p PCI, DM 2, COPD 30+ pack year history of smoking   presents to the ED with shortness of breath that started around 5pm after dinner. Also vomited x3. 1x diarrhea.    In the ED, febrile to 38.2 C, started on 6L nasal cannula, saturating in mid 90s. Noted to be febrile. Systolic BP to be in 70s and 90s. 500 mL bolus given with no adequate response. Levo gtt started. MAP of 75 on levo 0.05. WBC elevated at 19k, Hgb of 12.8, lactate elevated at 2.3, Trop at 101 and BNP at 6243. No signs of UTI on the urinalysis. VBG with pH 7.36/41/45/23. Chest x ray clear. CT angio chest shows no evidence of PE detected, however showing small R pleural effusion and severe right hydronephrosis.     Currently Afebrile now but with persistent low grade fevers  white count has normalized  Chronically thrombocytopenic worsened today at 97  Worsening kidney function  Mild transaminase, total bili wnl today  Ct imaging with rightsided hydro worsening since 7/2022  u/a and urine cx neg  blood cx negx2  cxr clear   No PE from CTA-stable chronic right pleural effusion  Was on Cefepime    Plan  No identifiable source of infection  Pt appears non toxic  Recommend change cefepime to ceftriaxone 2gram q24h   Await US abd results  Continue to trend WBC  Monitor fever curve  Plan d/w Dr. Echeverria And MICU team.

## 2023-03-03 NOTE — ASSESSMENT
[FreeTextEntry1] : # Chronic systolic HF ACC/AHA stage C-D, NYHA class\par Etiology: ICMP, LVEF 10-15%, LVIDd 7cm\par GDMT: Toprol Xl 75 mg daily, Entresto 49-51 mg BID, spironolactone 50 mg daily, and Jardiance 10 mg daily\par Diuretics: Continue Bumex to 4mg BID until at target weight 243-244 lbs; Start metolazone 2.5mg twice a week\par Obtain labs in a 7-10 days \par Device: s/p S-ICD\par Labs: \par HF education provided including lifestyle changes (low Na diet, fluid restriction, increase physical activity), current clinical condition, natural progression and prognosis. \par Will repeat TTE once euvolemic and optimized on HF GDMT outpatient\par Referred to [facility] on [facility]\par CardioMEMs discussed and they remain interested. Will optimize volume status and schedule implant at next available with Dr. Smith.\par \par #CKD 3\par diurese as above\par \par #CAD\par s/p anterior wall STEMI ‘PCI to large RI ’18 PCI to LCx ‘19\par On DAPT and high intensity statin\par \par RTC in 3 weeks with Dr. Vanegas

## 2023-03-04 LAB
ALBUMIN SERPL ELPH-MCNC: 3.2 G/DL — LOW (ref 3.3–5)
ALBUMIN SERPL ELPH-MCNC: 3.3 G/DL — SIGNIFICANT CHANGE UP (ref 3.3–5)
ALBUMIN SERPL ELPH-MCNC: 3.5 G/DL — SIGNIFICANT CHANGE UP (ref 3.3–5)
ALP SERPL-CCNC: 169 U/L — HIGH (ref 40–120)
ALP SERPL-CCNC: 196 U/L — HIGH (ref 40–120)
ALP SERPL-CCNC: 225 U/L — HIGH (ref 40–120)
ALT FLD-CCNC: 78 U/L — HIGH (ref 10–45)
ALT FLD-CCNC: 81 U/L — HIGH (ref 10–45)
ALT FLD-CCNC: 89 U/L — HIGH (ref 10–45)
ANION GAP SERPL CALC-SCNC: 19 MMOL/L — HIGH (ref 5–17)
ANION GAP SERPL CALC-SCNC: 20 MMOL/L — HIGH (ref 5–17)
ANION GAP SERPL CALC-SCNC: 21 MMOL/L — HIGH (ref 5–17)
APTT BLD: 29.6 SEC — SIGNIFICANT CHANGE UP (ref 27.5–35.5)
AST SERPL-CCNC: 47 U/L — HIGH (ref 10–40)
AST SERPL-CCNC: 48 U/L — HIGH (ref 10–40)
AST SERPL-CCNC: 61 U/L — HIGH (ref 10–40)
BASE EXCESS BLDV CALC-SCNC: -6.7 MMOL/L — LOW (ref -2–3)
BASOPHILS # BLD AUTO: 0.02 K/UL — SIGNIFICANT CHANGE UP (ref 0–0.2)
BASOPHILS NFR BLD AUTO: 0.2 % — SIGNIFICANT CHANGE UP (ref 0–2)
BILIRUB SERPL-MCNC: 0.8 MG/DL — SIGNIFICANT CHANGE UP (ref 0.2–1.2)
BILIRUB SERPL-MCNC: 0.8 MG/DL — SIGNIFICANT CHANGE UP (ref 0.2–1.2)
BILIRUB SERPL-MCNC: 0.9 MG/DL — SIGNIFICANT CHANGE UP (ref 0.2–1.2)
BUN SERPL-MCNC: 103 MG/DL — HIGH (ref 7–23)
BUN SERPL-MCNC: 98 MG/DL — HIGH (ref 7–23)
BUN SERPL-MCNC: 99 MG/DL — HIGH (ref 7–23)
CA-I SERPL-SCNC: 1.07 MMOL/L — LOW (ref 1.15–1.33)
CALCIUM SERPL-MCNC: 8.3 MG/DL — LOW (ref 8.4–10.5)
CALCIUM SERPL-MCNC: 8.3 MG/DL — LOW (ref 8.4–10.5)
CALCIUM SERPL-MCNC: 8.6 MG/DL — SIGNIFICANT CHANGE UP (ref 8.4–10.5)
CHLORIDE BLDV-SCNC: 92 MMOL/L — LOW (ref 96–108)
CHLORIDE SERPL-SCNC: 91 MMOL/L — LOW (ref 96–108)
CHLORIDE SERPL-SCNC: 92 MMOL/L — LOW (ref 96–108)
CHLORIDE SERPL-SCNC: 92 MMOL/L — LOW (ref 96–108)
CO2 BLDV-SCNC: 21 MMOL/L — LOW (ref 22–26)
CO2 SERPL-SCNC: 16 MMOL/L — LOW (ref 22–31)
CO2 SERPL-SCNC: 17 MMOL/L — LOW (ref 22–31)
CO2 SERPL-SCNC: 17 MMOL/L — LOW (ref 22–31)
CREAT SERPL-MCNC: 4.53 MG/DL — HIGH (ref 0.5–1.3)
CREAT SERPL-MCNC: 4.88 MG/DL — HIGH (ref 0.5–1.3)
CREAT SERPL-MCNC: 4.95 MG/DL — HIGH (ref 0.5–1.3)
EGFR: 13 ML/MIN/1.73M2 — LOW
EGFR: 13 ML/MIN/1.73M2 — LOW
EGFR: 14 ML/MIN/1.73M2 — LOW
EOSINOPHIL # BLD AUTO: 0.07 K/UL — SIGNIFICANT CHANGE UP (ref 0–0.5)
EOSINOPHIL NFR BLD AUTO: 0.7 % — SIGNIFICANT CHANGE UP (ref 0–6)
GAS PNL BLDV: 127 MMOL/L — LOW (ref 136–145)
GAS PNL BLDV: SIGNIFICANT CHANGE UP
GAS PNL BLDV: SIGNIFICANT CHANGE UP
GLUCOSE BLDC GLUCOMTR-MCNC: 153 MG/DL — HIGH (ref 70–99)
GLUCOSE BLDC GLUCOMTR-MCNC: 161 MG/DL — HIGH (ref 70–99)
GLUCOSE BLDC GLUCOMTR-MCNC: 167 MG/DL — HIGH (ref 70–99)
GLUCOSE BLDC GLUCOMTR-MCNC: 209 MG/DL — HIGH (ref 70–99)
GLUCOSE BLDV-MCNC: 172 MG/DL — HIGH (ref 70–99)
GLUCOSE SERPL-MCNC: 157 MG/DL — HIGH (ref 70–99)
GLUCOSE SERPL-MCNC: 179 MG/DL — HIGH (ref 70–99)
GLUCOSE SERPL-MCNC: 183 MG/DL — HIGH (ref 70–99)
HCO3 BLDV-SCNC: 20 MMOL/L — LOW (ref 22–29)
HCT VFR BLD CALC: 35.6 % — LOW (ref 39–50)
HCT VFR BLDA CALC: 32 % — LOW (ref 39–51)
HGB BLD CALC-MCNC: 10.8 G/DL — LOW (ref 12.6–17.4)
HGB BLD-MCNC: 10.6 G/DL — LOW (ref 13–17)
HOROWITZ INDEX BLDV+IHG-RTO: 28 — SIGNIFICANT CHANGE UP
IMM GRANULOCYTES NFR BLD AUTO: 0.8 % — SIGNIFICANT CHANGE UP (ref 0–0.9)
INR BLD: 1.33 RATIO — HIGH (ref 0.88–1.16)
LACTATE BLDV-MCNC: 1.4 MMOL/L — SIGNIFICANT CHANGE UP (ref 0.5–2)
LYMPHOCYTES # BLD AUTO: 0.45 K/UL — LOW (ref 1–3.3)
LYMPHOCYTES # BLD AUTO: 4.6 % — LOW (ref 13–44)
MAGNESIUM SERPL-MCNC: 2.2 MG/DL — SIGNIFICANT CHANGE UP (ref 1.6–2.6)
MAGNESIUM SERPL-MCNC: 2.3 MG/DL — SIGNIFICANT CHANGE UP (ref 1.6–2.6)
MAGNESIUM SERPL-MCNC: 2.4 MG/DL — SIGNIFICANT CHANGE UP (ref 1.6–2.6)
MCHC RBC-ENTMCNC: 23.4 PG — LOW (ref 27–34)
MCHC RBC-ENTMCNC: 29.8 GM/DL — LOW (ref 32–36)
MCV RBC AUTO: 78.6 FL — LOW (ref 80–100)
MONOCYTES # BLD AUTO: 0.62 K/UL — SIGNIFICANT CHANGE UP (ref 0–0.9)
MONOCYTES NFR BLD AUTO: 6.3 % — SIGNIFICANT CHANGE UP (ref 2–14)
NEUTROPHILS # BLD AUTO: 8.57 K/UL — HIGH (ref 1.8–7.4)
NEUTROPHILS NFR BLD AUTO: 87.4 % — HIGH (ref 43–77)
NRBC # BLD: 0 /100 WBCS — SIGNIFICANT CHANGE UP (ref 0–0)
PCO2 BLDV: 42 MMHG — SIGNIFICANT CHANGE UP (ref 42–55)
PH BLDV: 7.28 — LOW (ref 7.32–7.43)
PHOSPHATE SERPL-MCNC: 6.4 MG/DL — HIGH (ref 2.5–4.5)
PHOSPHATE SERPL-MCNC: 7 MG/DL — HIGH (ref 2.5–4.5)
PHOSPHATE SERPL-MCNC: 7.2 MG/DL — HIGH (ref 2.5–4.5)
PLATELET # BLD AUTO: 87 K/UL — LOW (ref 150–400)
PO2 BLDV: 45 MMHG — SIGNIFICANT CHANGE UP (ref 25–45)
POTASSIUM BLDV-SCNC: 3.5 MMOL/L — SIGNIFICANT CHANGE UP (ref 3.5–5.1)
POTASSIUM SERPL-MCNC: 3.4 MMOL/L — LOW (ref 3.5–5.3)
POTASSIUM SERPL-MCNC: 3.5 MMOL/L — SIGNIFICANT CHANGE UP (ref 3.5–5.3)
POTASSIUM SERPL-MCNC: 3.7 MMOL/L — SIGNIFICANT CHANGE UP (ref 3.5–5.3)
POTASSIUM SERPL-SCNC: 3.4 MMOL/L — LOW (ref 3.5–5.3)
POTASSIUM SERPL-SCNC: 3.5 MMOL/L — SIGNIFICANT CHANGE UP (ref 3.5–5.3)
POTASSIUM SERPL-SCNC: 3.7 MMOL/L — SIGNIFICANT CHANGE UP (ref 3.5–5.3)
PROT SERPL-MCNC: 6.7 G/DL — SIGNIFICANT CHANGE UP (ref 6–8.3)
PROT SERPL-MCNC: 6.7 G/DL — SIGNIFICANT CHANGE UP (ref 6–8.3)
PROT SERPL-MCNC: 7 G/DL — SIGNIFICANT CHANGE UP (ref 6–8.3)
PROTHROM AB SERPL-ACNC: 15.5 SEC — HIGH (ref 10.5–13.4)
RBC # BLD: 4.53 M/UL — SIGNIFICANT CHANGE UP (ref 4.2–5.8)
RBC # FLD: 19.4 % — HIGH (ref 10.3–14.5)
SAO2 % BLDV: 65 % — LOW (ref 67–88)
SODIUM SERPL-SCNC: 128 MMOL/L — LOW (ref 135–145)
SODIUM SERPL-SCNC: 128 MMOL/L — LOW (ref 135–145)
SODIUM SERPL-SCNC: 129 MMOL/L — LOW (ref 135–145)
WBC # BLD: 9.81 K/UL — SIGNIFICANT CHANGE UP (ref 3.8–10.5)
WBC # FLD AUTO: 9.81 K/UL — SIGNIFICANT CHANGE UP (ref 3.8–10.5)

## 2023-03-04 PROCEDURE — 99233 SBSQ HOSP IP/OBS HIGH 50: CPT | Mod: GC

## 2023-03-04 RX ORDER — POTASSIUM CHLORIDE 20 MEQ
20 PACKET (EA) ORAL
Refills: 0 | Status: COMPLETED | OUTPATIENT
Start: 2023-03-04 | End: 2023-03-04

## 2023-03-04 RX ORDER — HYDROMORPHONE HYDROCHLORIDE 2 MG/ML
0.5 INJECTION INTRAMUSCULAR; INTRAVENOUS; SUBCUTANEOUS ONCE
Refills: 0 | Status: DISCONTINUED | OUTPATIENT
Start: 2023-03-04 | End: 2023-03-04

## 2023-03-04 RX ORDER — ACETAMINOPHEN 500 MG
1000 TABLET ORAL ONCE
Refills: 0 | Status: COMPLETED | OUTPATIENT
Start: 2023-03-04 | End: 2023-03-04

## 2023-03-04 RX ORDER — BUMETANIDE 0.25 MG/ML
1 INJECTION INTRAMUSCULAR; INTRAVENOUS
Qty: 20 | Refills: 0 | Status: DISCONTINUED | OUTPATIENT
Start: 2023-03-04 | End: 2023-03-05

## 2023-03-04 RX ORDER — HYDROMORPHONE HYDROCHLORIDE 2 MG/ML
0.25 INJECTION INTRAMUSCULAR; INTRAVENOUS; SUBCUTANEOUS ONCE
Refills: 0 | Status: DISCONTINUED | OUTPATIENT
Start: 2023-03-04 | End: 2023-03-04

## 2023-03-04 RX ADMIN — HEPARIN SODIUM 5000 UNIT(S): 5000 INJECTION INTRAVENOUS; SUBCUTANEOUS at 16:48

## 2023-03-04 RX ADMIN — Medication 650 MILLIGRAM(S): at 00:23

## 2023-03-04 RX ADMIN — HEPARIN SODIUM 5000 UNIT(S): 5000 INJECTION INTRAVENOUS; SUBCUTANEOUS at 00:24

## 2023-03-04 RX ADMIN — Medication 1 PATCH: at 19:00

## 2023-03-04 RX ADMIN — Medication 2: at 17:11

## 2023-03-04 RX ADMIN — PANTOPRAZOLE SODIUM 40 MILLIGRAM(S): 20 TABLET, DELAYED RELEASE ORAL at 08:09

## 2023-03-04 RX ADMIN — Medication 650 MILLIGRAM(S): at 12:55

## 2023-03-04 RX ADMIN — CEFTRIAXONE 100 MILLIGRAM(S): 500 INJECTION, POWDER, FOR SOLUTION INTRAMUSCULAR; INTRAVENOUS at 16:48

## 2023-03-04 RX ADMIN — BUMETANIDE 10 MG/HR: 0.25 INJECTION INTRAMUSCULAR; INTRAVENOUS at 02:16

## 2023-03-04 RX ADMIN — HYDROMORPHONE HYDROCHLORIDE 0.25 MILLIGRAM(S): 2 INJECTION INTRAMUSCULAR; INTRAVENOUS; SUBCUTANEOUS at 16:00

## 2023-03-04 RX ADMIN — Medication 650 MILLIGRAM(S): at 00:53

## 2023-03-04 RX ADMIN — HYDROMORPHONE HYDROCHLORIDE 0.25 MILLIGRAM(S): 2 INJECTION INTRAMUSCULAR; INTRAVENOUS; SUBCUTANEOUS at 15:32

## 2023-03-04 RX ADMIN — Medication 2: at 08:09

## 2023-03-04 RX ADMIN — BUMETANIDE 10 MG/HR: 0.25 INJECTION INTRAMUSCULAR; INTRAVENOUS at 08:00

## 2023-03-04 RX ADMIN — HYDROMORPHONE HYDROCHLORIDE 0.5 MILLIGRAM(S): 2 INJECTION INTRAMUSCULAR; INTRAVENOUS; SUBCUTANEOUS at 18:45

## 2023-03-04 RX ADMIN — Medication 20 MILLIEQUIVALENT(S): at 14:32

## 2023-03-04 RX ADMIN — Medication 650 MILLIGRAM(S): at 06:52

## 2023-03-04 RX ADMIN — Medication 650 MILLIGRAM(S): at 07:20

## 2023-03-04 RX ADMIN — INSULIN GLARGINE 35 UNIT(S): 100 INJECTION, SOLUTION SUBCUTANEOUS at 21:46

## 2023-03-04 RX ADMIN — ATORVASTATIN CALCIUM 80 MILLIGRAM(S): 80 TABLET, FILM COATED ORAL at 21:09

## 2023-03-04 RX ADMIN — BUMETANIDE 10 MG/HR: 0.25 INJECTION INTRAMUSCULAR; INTRAVENOUS at 21:09

## 2023-03-04 RX ADMIN — Medication 400 MILLIGRAM(S): at 16:47

## 2023-03-04 RX ADMIN — Medication 1 PATCH: at 12:18

## 2023-03-04 RX ADMIN — HYDROMORPHONE HYDROCHLORIDE 0.5 MILLIGRAM(S): 2 INJECTION INTRAMUSCULAR; INTRAVENOUS; SUBCUTANEOUS at 19:00

## 2023-03-04 RX ADMIN — Medication 650 MILLIGRAM(S): at 14:48

## 2023-03-04 RX ADMIN — Medication 2: at 12:19

## 2023-03-04 RX ADMIN — Medication 81 MILLIGRAM(S): at 12:17

## 2023-03-04 RX ADMIN — HEPARIN SODIUM 5000 UNIT(S): 5000 INJECTION INTRAVENOUS; SUBCUTANEOUS at 08:08

## 2023-03-04 RX ADMIN — Medication 1 PATCH: at 08:00

## 2023-03-04 RX ADMIN — Medication 20 MILLIEQUIVALENT(S): at 12:17

## 2023-03-04 RX ADMIN — CHLORHEXIDINE GLUCONATE 1 APPLICATION(S): 213 SOLUTION TOPICAL at 06:53

## 2023-03-04 RX ADMIN — HEPARIN SODIUM 5000 UNIT(S): 5000 INJECTION INTRAVENOUS; SUBCUTANEOUS at 23:54

## 2023-03-04 RX ADMIN — Medication 1 PATCH: at 12:26

## 2023-03-04 RX ADMIN — Medication 4: at 21:21

## 2023-03-04 RX ADMIN — CLOPIDOGREL BISULFATE 75 MILLIGRAM(S): 75 TABLET, FILM COATED ORAL at 12:17

## 2023-03-04 RX ADMIN — Medication 1000 MILLIGRAM(S): at 17:15

## 2023-03-04 RX ADMIN — TAMSULOSIN HYDROCHLORIDE 0.4 MILLIGRAM(S): 0.4 CAPSULE ORAL at 21:08

## 2023-03-04 NOTE — CHART NOTE - NSCHARTNOTEFT_GEN_A_CORE
MICU Transfer Note    Transfer from: MICU    Transfer to: ( x ) Medicine    (  ) Telemetry     (   ) RCU        (    ) Palliative         (   ) Stroke Unit          (   ) __________________    Accepting physican:      MICU COURSE:  62 year old male with pmh of HFrEF with an EF of 19% status post AICD, CAD status post stents, DM 2, HTN, COPD with a 30+ pack year history of smoking presents to the ED with shortness of breath, vomiting, diarrhea, hypoxia to mid 80s without NC --> 99% on 1L NC, hypotension requiring pressors in setting of fever admitted to MICU for further management. Patient is off pressors, with soft BP. Complicated by renal dysfunction likely i/s/o cardiorenal, continuing with aggressive diuresis.  At this time the patient has been placed on a Bumex gtt and is currently producing urine. Hemodynamics remain stable. The patient has been deemed cleared for transfer.     For Followup:  Please follow up with cardiology / hrt failure   Please follow up with nephrology   Please f/u with ID all cx (-) to date no reculture until fever is 101 per ICU   continue with ceftriaxone         Vital Signs Last 24 Hrs  T(C): 38 (04 Mar 2023 18:00), Max: 38.7 (04 Mar 2023 00:00)  T(F): 100.4 (04 Mar 2023 18:00), Max: 101.6 (04 Mar 2023 00:00)  HR: 104 (04 Mar 2023 18:00) (96 - 104)  BP: 120/72 (04 Mar 2023 18:00) (88/56 - 120/72)  BP(mean): 90 (04 Mar 2023 18:00) (66 - 90)  RR: 27 (04 Mar 2023 18:00) (13 - 28)  SpO2: 94% (04 Mar 2023 18:00) (89% - 97%)    Parameters below as of 04 Mar 2023 08:00  Patient On (Oxygen Delivery Method): nasal cannula  O2 Flow (L/min): 2    I&O's Summary    03 Mar 2023 07:01  -  04 Mar 2023 07:00  --------------------------------------------------------  IN: 600 mL / OUT: 400 mL / NET: 200 mL    04 Mar 2023 07:01  -  04 Mar 2023 19:10  --------------------------------------------------------  IN: 270 mL / OUT: 1800 mL / NET: -1530 mL        MEDICATIONS  (STANDING):  aspirin  chewable 81 milliGRAM(s) Oral daily  atorvastatin 80 milliGRAM(s) Oral at bedtime  buMETAnide Infusion 2 mG/Hr (10 mL/Hr) IV Continuous <Continuous>  cefTRIAXone   IVPB 2000 milliGRAM(s) IV Intermittent every 24 hours  chlorhexidine 4% Liquid 1 Application(s) Topical <User Schedule>  clopidogrel Tablet 75 milliGRAM(s) Oral daily  heparin   Injectable 5000 Unit(s) SubCutaneous every 8 hours  insulin glargine Injectable (LANTUS) 35 Unit(s) SubCutaneous at bedtime  insulin lispro (ADMELOG) corrective regimen sliding scale   SubCutaneous Before meals and at bedtime  nicotine -   7 mG/24Hr(s) Patch 1 Patch Transdermal daily  norepinephrine Infusion 0.037 MICROgram(s)/kG/Min (7 mL/Hr) IV Continuous <Continuous>  pantoprazole    Tablet 40 milliGRAM(s) Oral before breakfast  tamsulosin 0.4 milliGRAM(s) Oral at bedtime    MEDICATIONS  (PRN):  acetaminophen     Tablet .. 650 milliGRAM(s) Oral every 6 hours PRN Temp greater or equal to 38C (100.4F), Mild Pain (1 - 3)  acetaminophen  Suppository .. 650 milliGRAM(s) Rectal once PRN Temp greater or equal to 38C (100.4F)  albuterol    90 MICROgram(s) HFA Inhaler 2 Puff(s) Inhalation three times a day PRN Shortness of Breath and/or Wheezing  dextrose 50% Injectable 25 Gram(s) IV Push every 15 minutes PRN blood glucose <70        LABS                                            10.6                  Neurophils% (auto):   87.4   (03-04 @ 00:46):    9.81 )-----------(87           Lymphocytes% (auto):  4.6                                           35.6                   Eosinphils% (auto):   0.7      Manual%: Neutrophils x    ; Lymphocytes x    ; Eosinophils x    ; Bands%: x    ; Blasts x                                    129    |  91     |  99                  Calcium: 8.6   / iCa: x      (03-04 @ 18:23)    ----------------------------<  183       Magnesium: 2.2                              3.5     |  17     |  4.53             Phosphorous: 6.4      TPro  7.0    /  Alb  3.5    /  TBili  0.8    /  DBili  x      /  AST  47     /  ALT  78     /  AlkPhos  225    04 Mar 2023 18:23    ( 03-04 @ 00:45 )   PT: 15.5 sec;   INR: 1.33 ratio  aPTT: 29.6 sec      Annabel Hinton [facility]- [facility] ex [phone]

## 2023-03-04 NOTE — PROGRESS NOTE ADULT - ASSESSMENT
62 year old male with pmh of HFrEF with an EF of 19% status post AICD, CAD status post stents, DM2, HTN, COPD with a 30+ pack year history of smoking presents to the ED with shortness of breath.       1- MANJIT on CKD i,e ATN   2- sepsis  3- CHF  4- DM2  5- chronic R hydro    MANJIT on ckd III in setting of sepsis/fevers/hypotension   has hx of cardiomyopathy with ef < 20 %   worsening renal function is quite concerning.  bumex drip @ 2mg/hr with monitoring of his renal funtion   he also seems to have rash source is unclear.   He will likely need dialysis unless with bumex drip he becomes non oliguric   he still has fevers cont abx   insulin s-s-   strict I/O  pan culture  NGTD   d/w ICU team when seen earlier.

## 2023-03-04 NOTE — PROGRESS NOTE ADULT - SUBJECTIVE AND OBJECTIVE BOX
INTERVAL HPI/OVERNIGHT EVENTS:    SUBJECTIVE: Patient seen and examined at bedside.     CONSTITUTIONAL: No weakness, fevers or chills  EYES/ENT: No visual changes;  No vertigo or throat pain   NECK: No pain or stiffness  RESPIRATORY: No cough, wheezing, hemoptysis; No shortness of breath  CARDIOVASCULAR: No chest pain or palpitations  GASTROINTESTINAL: No abdominal or epigastric pain. No nausea, vomiting, or hematemesis; No diarrhea or constipation. No melena or hematochezia.  GENITOURINARY: No dysuria, frequency or hematuria  NEUROLOGICAL: No numbness or weakness  SKIN: No itching, rashes    OBJECTIVE:    VITAL SIGNS:  ICU Vital Signs Last 24 Hrs  T(C): 37.5 (04 Mar 2023 05:00), Max: 38.7 (04 Mar 2023 00:00)  T(F): 99.5 (04 Mar 2023 05:00), Max: 101.6 (04 Mar 2023 00:00)  HR: 101 (04 Mar 2023 07:00) (98 - 102)  BP: 102/66 (04 Mar 2023 07:00) (81/59 - 138/63)  BP(mean): 79 (04 Mar 2023 07:00) (65 - 91)  ABP: --  ABP(mean): --  RR: 24 (04 Mar 2023 07:00) (13 - 25)  SpO2: 96% (04 Mar 2023 07:00) (89% - 97%)    O2 Parameters below as of 03 Mar 2023 13:43  Patient On (Oxygen Delivery Method): room air              03-03 @ 07:01  -  03-04 @ 07:00  --------------------------------------------------------  IN: 600 mL / OUT: 400 mL / NET: 200 mL      CAPILLARY BLOOD GLUCOSE      POCT Blood Glucose.: 199 mg/dL (03 Mar 2023 22:32)      PHYSICAL EXAM:    General: NAD, Speaking on full sentences on Nasal cannula.   HEENT: NCAT.   Cardiac: RRR, warm extremities.   Chest: CTA, no crackles/wheezing bilaterally.   Abdomen: soft, non-distended, no ttp, no rebound or guarding. No CVA tenderness bilaterally.   Extremities: + bilateral peripheral pitting edema, No calf tenderness, No leg size discrepancies  Skin: no rashes, does have kaykay erythema in the RLE with scabbing ulcer, no drainage or purulence. No warmth   MSK: No focal spinal tenderness   Neuro: AAOx3, motor and sensory grossly intact.   Psych: mood and affect appropriate      MEDICATIONS:  MEDICATIONS  (STANDING):  aspirin  chewable 81 milliGRAM(s) Oral daily  atorvastatin 80 milliGRAM(s) Oral at bedtime  buMETAnide Infusion 2 mG/Hr (10 mL/Hr) IV Continuous <Continuous>  cefTRIAXone   IVPB 2000 milliGRAM(s) IV Intermittent every 24 hours  chlorhexidine 4% Liquid 1 Application(s) Topical <User Schedule>  clopidogrel Tablet 75 milliGRAM(s) Oral daily  heparin   Injectable 5000 Unit(s) SubCutaneous every 8 hours  insulin glargine Injectable (LANTUS) 35 Unit(s) SubCutaneous at bedtime  insulin lispro (ADMELOG) corrective regimen sliding scale   SubCutaneous Before meals and at bedtime  nicotine -   7 mG/24Hr(s) Patch 1 Patch Transdermal daily  norepinephrine Infusion 0.037 MICROgram(s)/kG/Min (7 mL/Hr) IV Continuous <Continuous>  pantoprazole    Tablet 40 milliGRAM(s) Oral before breakfast  tamsulosin 0.4 milliGRAM(s) Oral at bedtime    MEDICATIONS  (PRN):  acetaminophen     Tablet .. 650 milliGRAM(s) Oral every 6 hours PRN Temp greater or equal to 38C (100.4F), Mild Pain (1 - 3)  acetaminophen  Suppository .. 650 milliGRAM(s) Rectal once PRN Temp greater or equal to 38C (100.4F)  albuterol    90 MICROgram(s) HFA Inhaler 2 Puff(s) Inhalation three times a day PRN Shortness of Breath and/or Wheezing  dextrose 50% Injectable 25 Gram(s) IV Push every 15 minutes PRN blood glucose <70      ALLERGIES:  Allergies    Zosyn (Pruritus)    Intolerances        LABS:                        10.6   9.81  )-----------( 87       ( 04 Mar 2023 00:46 )             35.6     03-04    128<L>  |  92<L>  |  103<H>  ----------------------------<  179<H>  3.7   |  16<L>  |  4.95<H>    Ca    8.3<L>      04 Mar 2023 00:46  Phos  7.2     03-04  Mg     2.4     03-04    TPro  6.7  /  Alb  3.2<L>  /  TBili  0.8  /  DBili  x   /  AST  61<H>  /  ALT  89<H>  /  AlkPhos  169<H>  03-04    PT/INR - ( 04 Mar 2023 00:45 )   PT: 15.5 sec;   INR: 1.33 ratio         PTT - ( 04 Mar 2023 00:45 )  PTT:29.6 sec      RADIOLOGY & ADDITIONAL TESTS: Reviewed. INTERVAL HPI/OVERNIGHT EVENTS:    SUBJECTIVE: Patient seen and examined at bedside. Patient reports pain in the area of his rash and difficulty moving as a result. Also reports fevers and chills, subjective shortness of breath at times and on NC.     CONSTITUTIONAL: No weakness, fevers or chills  EYES/ENT: No visual changes;  No vertigo or throat pain   NECK: No pain or stiffness  RESPIRATORY: No cough, wheezing, hemoptysis; No shortness of breath  CARDIOVASCULAR: No chest pain or palpitations  GASTROINTESTINAL: No abdominal or epigastric pain. No nausea, vomiting, or hematemesis; No diarrhea or constipation. No melena or hematochezia.  GENITOURINARY: No dysuria, frequency or hematuria  NEUROLOGICAL: No numbness or weakness  SKIN: No itching, rashes    OBJECTIVE:    VITAL SIGNS:  ICU Vital Signs Last 24 Hrs  T(C): 37.5 (04 Mar 2023 05:00), Max: 38.7 (04 Mar 2023 00:00)  T(F): 99.5 (04 Mar 2023 05:00), Max: 101.6 (04 Mar 2023 00:00)  HR: 101 (04 Mar 2023 07:00) (98 - 102)  BP: 102/66 (04 Mar 2023 07:00) (81/59 - 138/63)  BP(mean): 79 (04 Mar 2023 07:00) (65 - 91)  ABP: --  ABP(mean): --  RR: 24 (04 Mar 2023 07:00) (13 - 25)  SpO2: 96% (04 Mar 2023 07:00) (89% - 97%)    O2 Parameters below as of 03 Mar 2023 13:43  Patient On (Oxygen Delivery Method): room air              03-03 @ 07:01  -  03-04 @ 07:00  --------------------------------------------------------  IN: 600 mL / OUT: 400 mL / NET: 200 mL      CAPILLARY BLOOD GLUCOSE      POCT Blood Glucose.: 199 mg/dL (03 Mar 2023 22:32)      PHYSICAL EXAM:    General: NAD, Speaking on full sentences on Nasal cannula.   HEENT: NCAT.   Cardiac: RRR, warm extremities.   Chest: CTA, no crackles/wheezing bilaterally.   Abdomen: soft, non-distended, no ttp, no rebound or guarding. No CVA tenderness bilaterally.   Extremities: + bilateral peripheral pitting edema, No calf tenderness, No leg size discrepancies  Skin: no rashes, does have kaykay erythema in the RLE with scabbing ulcer, no drainage or purulence. No warmth   MSK: No focal spinal tenderness   Neuro: AAOx3, motor and sensory grossly intact.   Psych: mood and affect appropriate      MEDICATIONS:  MEDICATIONS  (STANDING):  aspirin  chewable 81 milliGRAM(s) Oral daily  atorvastatin 80 milliGRAM(s) Oral at bedtime  buMETAnide Infusion 2 mG/Hr (10 mL/Hr) IV Continuous <Continuous>  cefTRIAXone   IVPB 2000 milliGRAM(s) IV Intermittent every 24 hours  chlorhexidine 4% Liquid 1 Application(s) Topical <User Schedule>  clopidogrel Tablet 75 milliGRAM(s) Oral daily  heparin   Injectable 5000 Unit(s) SubCutaneous every 8 hours  insulin glargine Injectable (LANTUS) 35 Unit(s) SubCutaneous at bedtime  insulin lispro (ADMELOG) corrective regimen sliding scale   SubCutaneous Before meals and at bedtime  nicotine -   7 mG/24Hr(s) Patch 1 Patch Transdermal daily  norepinephrine Infusion 0.037 MICROgram(s)/kG/Min (7 mL/Hr) IV Continuous <Continuous>  pantoprazole    Tablet 40 milliGRAM(s) Oral before breakfast  tamsulosin 0.4 milliGRAM(s) Oral at bedtime    MEDICATIONS  (PRN):  acetaminophen     Tablet .. 650 milliGRAM(s) Oral every 6 hours PRN Temp greater or equal to 38C (100.4F), Mild Pain (1 - 3)  acetaminophen  Suppository .. 650 milliGRAM(s) Rectal once PRN Temp greater or equal to 38C (100.4F)  albuterol    90 MICROgram(s) HFA Inhaler 2 Puff(s) Inhalation three times a day PRN Shortness of Breath and/or Wheezing  dextrose 50% Injectable 25 Gram(s) IV Push every 15 minutes PRN blood glucose <70      ALLERGIES:  Allergies    Zosyn (Pruritus)    Intolerances        LABS:                        10.6   9.81  )-----------( 87       ( 04 Mar 2023 00:46 )             35.6     03-04    128<L>  |  92<L>  |  103<H>  ----------------------------<  179<H>  3.7   |  16<L>  |  4.95<H>    Ca    8.3<L>      04 Mar 2023 00:46  Phos  7.2     03-04  Mg     2.4     03-04    TPro  6.7  /  Alb  3.2<L>  /  TBili  0.8  /  DBili  x   /  AST  61<H>  /  ALT  89<H>  /  AlkPhos  169<H>  03-04    PT/INR - ( 04 Mar 2023 00:45 )   PT: 15.5 sec;   INR: 1.33 ratio         PTT - ( 04 Mar 2023 00:45 )  PTT:29.6 sec      RADIOLOGY & ADDITIONAL TESTS: Reviewed. INTERVAL HPI/OVERNIGHT EVENTS:    SUBJECTIVE: Patient seen and examined at bedside. Patient reports pain in the area of his rash and difficulty moving as a result. Also reports intermittent fevers and chills, subjective shortness of breath at times and on NC. Denies chest pain or lightheadedness.     CONSTITUTIONAL: No weakness, +fevers or chills  EYES/ENT: No visual changes;  No vertigo or throat pain   NECK: No pain or stiffness  RESPIRATORY: No cough, wheezing, hemoptysis; No shortness of breath  CARDIOVASCULAR: No chest pain or palpitations  GASTROINTESTINAL: No abdominal or epigastric pain. No nausea, vomiting, or hematemesis; No diarrhea or constipation. No melena or hematochezia.  GENITOURINARY: No dysuria, frequency or hematuria  NEUROLOGICAL: No numbness or weakness  SKIN: +LLE erythematous rash     OBJECTIVE:    VITAL SIGNS:  ICU Vital Signs Last 24 Hrs  T(C): 37.5 (04 Mar 2023 05:00), Max: 38.7 (04 Mar 2023 00:00)  T(F): 99.5 (04 Mar 2023 05:00), Max: 101.6 (04 Mar 2023 00:00)  HR: 101 (04 Mar 2023 07:00) (98 - 102)  BP: 102/66 (04 Mar 2023 07:00) (81/59 - 138/63)  BP(mean): 79 (04 Mar 2023 07:00) (65 - 91)  ABP: --  ABP(mean): --  RR: 24 (04 Mar 2023 07:00) (13 - 25)  SpO2: 96% (04 Mar 2023 07:00) (89% - 97%)    O2 Parameters below as of 03 Mar 2023 13:43  Patient On (Oxygen Delivery Method): room air              03-03 @ 07:01  -  03-04 @ 07:00  --------------------------------------------------------  IN: 600 mL / OUT: 400 mL / NET: 200 mL      CAPILLARY BLOOD GLUCOSE      POCT Blood Glucose.: 199 mg/dL (03 Mar 2023 22:32)      PHYSICAL EXAM:    General: NAD, Speaking on full sentences on Nasal cannula.   HEENT: NCAT.   Cardiac: RRR, warm extremities.   Chest: CTA, no crackles/wheezing bilaterally.   Abdomen: soft, non-distended, no ttp, no rebound or guarding. No CVA tenderness bilaterally.   Extremities: + bilateral peripheral pitting edema, No calf tenderness, No leg size discrepancies  Skin: new LLE erythema, warmth, ttp, RLE kaykay erythema and scab without drainage   MSK: No focal spinal tenderness   Neuro: AAOx3, motor and sensory grossly intact.   Psych: mood and affect appropriate      MEDICATIONS:  MEDICATIONS  (STANDING):  aspirin  chewable 81 milliGRAM(s) Oral daily  atorvastatin 80 milliGRAM(s) Oral at bedtime  buMETAnide Infusion 2 mG/Hr (10 mL/Hr) IV Continuous <Continuous>  cefTRIAXone   IVPB 2000 milliGRAM(s) IV Intermittent every 24 hours  chlorhexidine 4% Liquid 1 Application(s) Topical <User Schedule>  clopidogrel Tablet 75 milliGRAM(s) Oral daily  heparin   Injectable 5000 Unit(s) SubCutaneous every 8 hours  insulin glargine Injectable (LANTUS) 35 Unit(s) SubCutaneous at bedtime  insulin lispro (ADMELOG) corrective regimen sliding scale   SubCutaneous Before meals and at bedtime  nicotine -   7 mG/24Hr(s) Patch 1 Patch Transdermal daily  norepinephrine Infusion 0.037 MICROgram(s)/kG/Min (7 mL/Hr) IV Continuous <Continuous>  pantoprazole    Tablet 40 milliGRAM(s) Oral before breakfast  tamsulosin 0.4 milliGRAM(s) Oral at bedtime    MEDICATIONS  (PRN):  acetaminophen     Tablet .. 650 milliGRAM(s) Oral every 6 hours PRN Temp greater or equal to 38C (100.4F), Mild Pain (1 - 3)  acetaminophen  Suppository .. 650 milliGRAM(s) Rectal once PRN Temp greater or equal to 38C (100.4F)  albuterol    90 MICROgram(s) HFA Inhaler 2 Puff(s) Inhalation three times a day PRN Shortness of Breath and/or Wheezing  dextrose 50% Injectable 25 Gram(s) IV Push every 15 minutes PRN blood glucose <70      ALLERGIES:  Allergies    Zosyn (Pruritus)    Intolerances        LABS:                        10.6   9.81  )-----------( 87       ( 04 Mar 2023 00:46 )             35.6     03-04    128<L>  |  92<L>  |  103<H>  ----------------------------<  179<H>  3.7   |  16<L>  |  4.95<H>    Ca    8.3<L>      04 Mar 2023 00:46  Phos  7.2     03-04  Mg     2.4     03-04    TPro  6.7  /  Alb  3.2<L>  /  TBili  0.8  /  DBili  x   /  AST  61<H>  /  ALT  89<H>  /  AlkPhos  169<H>  03-04    PT/INR - ( 04 Mar 2023 00:45 )   PT: 15.5 sec;   INR: 1.33 ratio         PTT - ( 04 Mar 2023 00:45 )  PTT:29.6 sec      RADIOLOGY & ADDITIONAL TESTS: Reviewed.

## 2023-03-04 NOTE — PROGRESS NOTE ADULT - SUBJECTIVE AND OBJECTIVE BOX
[facility]  Lul Cruz  Office: [phone]  Cell: [phone]  Can also be reached on [employer] Teams      INTERVAL HPI/OVERNIGHT EVENTS: Persistent fevers, new back rash and pruritic-ness, new left lower extremity pain and erythema.  Started on oxygen overnight due to desaturation. Plans for further diuresis by primary team.      REVIEW OF SYSTEMS:  + Rash, pruiticness, left lower extremity pain- descibed as burning.     Vital Signs Last 24 Hrs  T(C): 37.7 (04 Mar 2023 09:00), Max: 38.7 (04 Mar 2023 00:00)  T(F): 99.9 (04 Mar 2023 09:00), Max: 101.6 (04 Mar 2023 00:00)  HR: 100 (04 Mar 2023 11:00) (96 - 102)  BP: 95/59 (04 Mar 2023 11:00) (81/59 - 121/80)  BP(mean): 73 (04 Mar 2023 11:00) (65 - 86)  RR: 18 (04 Mar 2023 11:00) (13 - 25)  SpO2: 96% (04 Mar 2023 11:00) (89% - 97%)    Parameters below as of 03 Mar 2023 13:43  Patient On (Oxygen Delivery Method): room air        PHYSICAL EXAMINATION:  GENERAL: NAD, AAOx4 to person place time situation, no confusion  HEAD:  Atraumatic, Normocephalic  EYES:  conjunctiva and sclera clear  NECK: Supple, Normal thyroid  CHEST/LUNG: Clear to auscultation. no wheezing, diminished breath sounds  HEART: Regular tachycardia  ABDOMEN: Soft, Nontender, Ndistended, bowel sounds present  NERVOUS SYSTEM:  Alert & Oriented X3,  moving extremities spontaneously, no focal deficits  EXTREMITIES:  2+ Peripheral Pulses, No pitting edema, has an ulcer on the right, leg, ulcer appears to be healing, new left lower extremity tenderness erythema and increased swelling at the ankle. 2+ palpable pulses, doppler performed by RN was reportedly normal.  SKIN: warm dry                            10.6   9.81  )-----------( 87       ( 04 Mar 2023 00:46 )             35.6     03-04    128<L>  |  92<L>  |  98<H>  ----------------------------<  157<H>  3.4<L>   |  17<L>  |  4.88<H>    Ca    8.3<L>      04 Mar 2023 10:30  Phos  7.0     03-04  Mg     2.3     03-04    TPro  6.7  /  Alb  3.3  /  TBili  0.9  /  DBili  x   /  AST  48<H>  /  ALT  81<H>  /  AlkPhos  196<H>  03-04    LIVER FUNCTIONS - ( 04 Mar 2023 10:30 )  Alb: 3.3 g/dL / Pro: 6.7 g/dL / ALK PHOS: 196 U/L / ALT: 81 U/L / AST: 48 U/L / GGT: x               PT/INR - ( 04 Mar 2023 00:45 )   PT: 15.5 sec;   INR: 1.33 ratio         PTT - ( 04 Mar 2023 00:45 )  PTT:29.6 sec    CAPILLARY BLOOD GLUCOSE      RADIOLOGY & ADDITIONAL TESTS:

## 2023-03-04 NOTE — PROGRESS NOTE ADULT - SUBJECTIVE AND OBJECTIVE BOX
[facility]   [phone]  RENAL FOLLOW UP NOTE  --------------------------------------------------------------------------------  Chief Complaint:    24 hour events/subjective:    seen earlier   c/o itching   no worsening sob     PAST HISTORY  --------------------------------------------------------------------------------  No significant changes to PMH, PSH, FHx, SHx, unless otherwise noted    ALLERGIES & MEDICATIONS  --------------------------------------------------------------------------------  Allergies    Zosyn (Pruritus)    Intolerances      Standing Inpatient Medications  aspirin  chewable 81 milliGRAM(s) Oral daily  atorvastatin 80 milliGRAM(s) Oral at bedtime  buMETAnide Infusion 2 mG/Hr IV Continuous <Continuous>  cefTRIAXone   IVPB 2000 milliGRAM(s) IV Intermittent every 24 hours  chlorhexidine 4% Liquid 1 Application(s) Topical <User Schedule>  clopidogrel Tablet 75 milliGRAM(s) Oral daily  heparin   Injectable 5000 Unit(s) SubCutaneous every 8 hours  insulin glargine Injectable (LANTUS) 35 Unit(s) SubCutaneous at bedtime  insulin lispro (ADMELOG) corrective regimen sliding scale   SubCutaneous Before meals and at bedtime  nicotine -   7 mG/24Hr(s) Patch 1 Patch Transdermal daily  norepinephrine Infusion 0.037 MICROgram(s)/kG/Min IV Continuous <Continuous>  pantoprazole    Tablet 40 milliGRAM(s) Oral before breakfast  tamsulosin 0.4 milliGRAM(s) Oral at bedtime    PRN Inpatient Medications  acetaminophen     Tablet .. 650 milliGRAM(s) Oral every 6 hours PRN  acetaminophen  Suppository .. 650 milliGRAM(s) Rectal once PRN  albuterol    90 MICROgram(s) HFA Inhaler 2 Puff(s) Inhalation three times a day PRN  dextrose 50% Injectable 25 Gram(s) IV Push every 15 minutes PRN      REVIEW OF SYSTEMS  --------------------------------------------------------------------------------    Gen: denies  fevers/chills,  CVS: denies chest pain/palpitations  Resp:  + SOB/Cough  GI: Denies N/V/Abd pain  : Denies dysuria    VITALS/PHYSICAL EXAM  --------------------------------------------------------------------------------  T(C): 38.2 (03-04-23 @ 14:00), Max: 38.7 (03-04-23 @ 00:00)  HR: 99 (03-04-23 @ 15:00) (96 - 102)  BP: 108/62 (03-04-23 @ 15:00) (81/59 - 109/66)  RR: 24 (03-04-23 @ 15:00) (13 - 28)  SpO2: 97% (03-04-23 @ 15:00) (89% - 97%)  Wt(kg): --        03-03-23 @ 07:01  -  03-04-23 @ 07:00  --------------------------------------------------------  IN: 600 mL / OUT: 400 mL / NET: 200 mL    03-04-23 @ 07:01  -  03-04-23 @ 16:59  --------------------------------------------------------  IN: 70 mL / OUT: 300 mL / NET: -230 mL      Physical Exam:  	    	Gen: ill appearing,  on O2   	Pulm: decrease bs  no rales or ronchi or wheezing  	CV: no JVD. RRR, S1S2; no rub  	Abd: +BS, soft, nontender/nondistended, obese  	UE: Warm, no cyanosis  no clubbing,  no edema;   	LE: Warm, no cyanosis  no clubbing, no edema, RLE erhythema + skin ulcer present on admission as well   	Neuro: alert and oriented.  	Skin: Warm, no decrease skin turgor . + rash macular back thoracic and left leg area       LABS/STUDIES  --------------------------------------------------------------------------------              10.6   9.81  >-----------<  87       [03-04-23 @ 00:46]              35.6     128  |  92  |  98  ----------------------------<  157      [03-04-23 @ 10:30]  3.4   |  17  |  4.88        Ca     8.3     [03-04-23 @ 10:30]      Mg     2.3     [03-04-23 @ 10:30]      Phos  7.0     [03-04-23 @ 10:30]    TPro  6.7  /  Alb  3.3  /  TBili  0.9  /  DBili  x   /  AST  48  /  ALT  81  /  AlkPhos  196  [03-04-23 @ 10:30]    PT/INR: PT 15.5 , INR 1.33       [03-04-23 @ 00:45]  PTT: 29.6       [03-04-23 @ 00:45]      Creatinine Trend:  SCr 4.88 [03-04 @ 10:30]  SCr 4.95 [03-04 @ 00:46]  SCr 4.65 [03-03 @ 17:07]  SCr 3.90 [03-03 @ 00:52]  SCr 3.33 [03-02 @ 06:08]              Urinalysis - [03-01-23 @ 18:53]      Color Yellow / Appearance Clear / SG 1.022 / pH 6.0      Gluc 500 mg/dL / Ketone Negative  / Bili Small / Urobili 3 mg/dL       Blood Negative / Protein 100 mg/dl / Leuk Est Negative / Nitrite Negative      RBC 2 / WBC 1 / Hyaline 0 / Gran  / Sq Epi  / Non Sq Epi 0 / Bacteria Negative      Iron 14, TIBC --, %sat --      [03-02-23 @ 01:20]  Ferritin 225      [03-02-23 @ 01:18]  PTH -- (Ca 8.9)      [10-02-22 @ 07:04]   73  HbA1c 8.9      [12-16-19 @ 08:15]  TSH 3.02      [09-30-22 @ 13:45]

## 2023-03-04 NOTE — PROGRESS NOTE ADULT - ASSESSMENT
This is a 62 year old male with pmh of HFrEF with an EF of 19% status post AICD, CAD status post stents, DM 2, HTN, COPD with a 30+ pack year history of smoking presents to the ED with shortness of breath, vomiting, diarrhea, hypoxia to mid 80s without NC --> 99% on 1L NC, hypotension requiring pressors in setting of fever admitted to MICU for further management. Now off levo, will monitor BPs for now.     --------------------------------------------------    Neuro  - A&O x4  - Not on any sedation    ------------------------------------------------------    Cardiovascular  # septic vs hypovolemic shock in setting of HFrEF of 19% s/p AICD  - 3 episode of vomiting, 1 episode of watery diarrhea in setting of poor PO intake, concerning for possible hypovolemic shock.   - On levo gtt, wean as tolerated.   - EKG shows sinus tachycardia  - POCUS shows severe LV systolic dysfunction with plethoric IVC, scattered B lines throughout  - BNP elevated 6243. Trop elevated at 101 likely in setting of demand from septic shock.   - TTE with EF 25% and severe diastolic dysfunction. No valvular pathologies   - No LE pitting edema, per POCUS today does have dilated IVC   - S/p Bumex 4mg IV  - started on Bumex gtt overnight     #HTN  - At home takes, Bumex 2mg tid, Entresto 24/26 BID, Metoprolol  qD, Spironolactone 25mg qD.  - Holding anti-hypertensives for soft BPs, goal systolic > 90   - off levo support       #CAD   -  HFrEF of 19% s/p AICD, GDMT  - Cont Plavix and aspirin.     -----------------------------------------------------------    Respiratory  # hypoxia in setting of poor perfusion  - on Nasal Cannula 1L, saturating above 98%, wean as tolerated.   - Not acidotic or alkalotic on VBG. Will monitor  - CT angio chest showing no evidence of PE although limited by motion. Small right pleural effusion.   - satting in the 90s on RA    ------------------------------------------------------------------    GI  # Nausea, vomiting, diarrhea  - Zofran as needed  - Will monitor symptoms and electrolytes. Replete as needed    # Severe R hydronephrosis seen on CT chest  - Hx of R hydronephrosis in 2020, currently presentation likely chronic.   - CT abd/pelv w/o contrast with hydro, no sign of abscess/infection     #Transaminitis   Elevated alk phos, LFTs   Likely i/s/o congestive hepatopathy, however, did have episode of abd pain last night   - follow up abdominal ultrasound     -----------------------------------------------------------------------    Renal  #ATN vs worsening cardiorenal  - Creatinine uptrending 3.9 (baseline 1.5 to 1.8)   - not retaining on bladder scan   - given albumin x2 (repeat POCUS with reduced IVC) --> increased urine output 300cc  - this AM IVC 2.2, given 4mg bumex  - monitor urine output   - Will monitor and trend. Avoid nephrotoxins and renally dose meds.     --------------------------------------------------------------------------    Endo  #T2DM  -  40 units of Lantus p.m. at home  - c/w 30U lantus + ISS     ----------------------------------------------------------------------    Heme  - No active issues  - DVT PPX: Lovenox 40 qD.     ---------------------------------------------------------------------    ID  #shock 2/2 likely sepsis with unclear etiology.  - Febrile. WBC elevated to 19k.  - s/p Cefepime and Vanc in the ED.  - Staph positive, MRSA neg   - BC 3/1 NGTD   - No clear etiology as of now.  - Urinalysis negative, chronic R hydronephrosis. CT abd/pelv without infectious source  - TTE without clear endocarditis   - CT chest with R pleural effusion, with no signs of pneumonia.   - Cont with cefepime and dose vanc by level with decreased CrCl    ------------------------------------------------------------------------  Ethics  - Full code    Diet   - Regular, consistent carb diet.  This is a 62 year old male with pmh of HFrEF with an EF of 19% status post AICD, CAD status post stents, DM 2, HTN, COPD with a 30+ pack year history of smoking presents to the ED with shortness of breath, vomiting, diarrhea, hypoxia to mid 80s without NC --> 99% on 1L NC, hypotension requiring pressors in setting of fever admitted to MICU for further management. Patient is off pressors, with soft BP. Complicated by renal dysfunction likely i/s/o cardiorenal, continuing with aggressive diuresis.      --------------------------------------------------    Neuro  - A&O x4  - Not on any sedation    ------------------------------------------------------    Cardiovascular  # septic vs hypovolemic shock in setting of HFrEF of 19% s/p AICD  - 3 episode of vomiting, 1 episode of watery diarrhea in setting of poor PO intake, concerning for possible hypovolemic shock.   - On levo gtt, wean as tolerated.   - EKG shows sinus tachycardia  - POCUS shows severe LV systolic dysfunction with plethoric IVC, scattered B lines throughout  - BNP elevated 6243. Trop elevated at 101 likely in setting of demand from septic shock.   - TTE with EF 25% and severe diastolic dysfunction. No valvular pathologies   - No LE pitting edema, per POCUS today does have dilated IVC   - S/p Bumex 4mg IV  - started on Bumex gtt overnight     #HTN  - At home takes, Bumex 2mg tid, Entresto 24/26 BID, Metoprolol  qD, Spironolactone 25mg qD.  - Holding anti-hypertensives for soft BPs, goal systolic > 90   - off levo support       #CAD   -  HFrEF of 19% s/p AICD, GDMT  - Cont Plavix and aspirin.     -----------------------------------------------------------    Respiratory  # hypoxia in setting of poor perfusion  - on Nasal Cannula 1L, saturating above 98%, wean as tolerated.   - Not acidotic or alkalotic on VBG. Will monitor  - CT angio chest showing no evidence of PE although limited by motion. Small right pleural effusion.   - satting in the 90s on RA    ------------------------------------------------------------------    GI  # Nausea, vomiting, diarrhea  - Zofran as needed  - Will monitor symptoms and electrolytes. Replete as needed    # Severe R hydronephrosis seen on CT chest  - Hx of R hydronephrosis in 2020, currently presentation likely chronic.   - CT abd/pelv w/o contrast with hydro, no sign of abscess/infection     #Transaminitis   Elevated alk phos, LFTs   Likely i/s/o congestive hepatopathy, however, did have episode of abd pain last night   - follow up abdominal ultrasound     -----------------------------------------------------------------------    Renal  #ATN vs worsening cardiorenal  - Creatinine uptrending 3.9 (baseline 1.5 to 1.8)   - not retaining on bladder scan   - given albumin x2 (repeat POCUS with reduced IVC) --> increased urine output 300cc  - this AM IVC 2.2, given 4mg bumex  - monitor urine output   - Will monitor and trend. Avoid nephrotoxins and renally dose meds.     --------------------------------------------------------------------------    Endo  #T2DM  -  40 units of Lantus p.m. at home  - c/w 30U lantus + ISS     ----------------------------------------------------------------------    Heme  - No active issues  - DVT PPX: Lovenox 40 qD.     ---------------------------------------------------------------------    ID  #shock 2/2 likely sepsis with unclear etiology.  - Febrile. WBC elevated to 19k.  - s/p Cefepime and Vanc in the ED.  - Staph positive, MRSA neg   - BC 3/1 NGTD   - No clear etiology as of now.  - Urinalysis negative, chronic R hydronephrosis. CT abd/pelv without infectious source  - TTE without clear endocarditis   - CT chest with R pleural effusion, with no signs of pneumonia.   - Cont with cefepime and dose vanc by level with decreased CrCl    ------------------------------------------------------------------------  Ethics  - Full code    Diet   - Regular, consistent carb diet.  This is a 62 year old male with pmh of HFrEF with an EF of 19% status post AICD, CAD status post stents, DM 2, HTN, COPD with a 30+ pack year history of smoking presents to the ED with shortness of breath, vomiting, diarrhea, hypoxia to mid 80s without NC --> 99% on 1L NC, hypotension requiring pressors in setting of fever admitted to MICU for further management. Patient is off pressors, with soft BP. Complicated by renal dysfunction likely i/s/o cardiorenal, continuing with aggressive diuresis.      --------------------------------------------------    Neuro  - A&O x4  - Not on any sedation    ------------------------------------------------------    Cardiovascular  # septic vs hypovolemic shock in setting of HFrEF of 19% s/p AICD  - 3 episode of vomiting, 1 episode of watery diarrhea in setting of poor PO intake, concerning for possible hypovolemic shock.   - On levo gtt, wean as tolerated.   - EKG shows sinus tachycardia  - POCUS shows severe LV systolic dysfunction with plethoric IVC, scattered B lines throughout  - BNP elevated 6243. Trop elevated at 101 likely in setting of demand from septic shock.   - TTE with EF 25% and severe diastolic dysfunction. No valvular pathologies   - No LE pitting edema, per POCUS today does have dilated IVC   - S/p Bumex 4mg IV  - started on Bumex gtt overnight     #HTN  - At home takes, Bumex 2mg tid, Entresto 24/26 BID, Metoprolol  qD, Spironolactone 25mg qD.  - Holding anti-hypertensives for soft BPs, goal systolic > 90   - off levo support   - monitor BP, soft       #CAD   -  HFrEF of 19% s/p AICD, GDMT  - Cont Plavix and aspirin.     -----------------------------------------------------------    Respiratory  # hypoxia in setting of poor perfusion  - on Nasal Cannula 1L, saturating above 98%, wean as tolerated.   - Not acidotic or alkalotic on VBG. Will monitor  - CT angio chest showing no evidence of PE although limited by motion. Small right pleural effusion.   - satting in the 90s on NC     ------------------------------------------------------------------    GI  # Nausea, vomiting, diarrhea  - Zofran as needed  - Will monitor symptoms and electrolytes. Replete as needed    # Severe R hydronephrosis seen on CT chest  - Hx of R hydronephrosis in 2020, currently presentation likely chronic.   - CT abd/pelv w/o contrast with hydro, no sign of abscess/infection     #Transaminitis   Elevated alk phos, LFTs   Likely i/s/o congestive hepatopathy  RUQ without signs of cholecystitis/dilated CBD   - CTM     -----------------------------------------------------------------------    Renal  #MANJIT    #ATN vs worsening cardiorenal vs delayed contrast induced?   #Anion gap metabolic acidosis   Creatinine uptrending 4.8 (baseline 1.5 to 1.8)   Not retaining on bladder scan   Given albumin x2 (repeat POCUS with reduced IVC) --> increased urine output 300cc  S/p 4mg Bumex with minimal output   - placed on bumex drip and started metolazone   - monitor urine output   - nephrology following, no urgent HD at this time   - Will monitor and trend. Avoid nephrotoxins and renally dose meds.     --------------------------------------------------------------------------    Endo  #T2DM  -  40 units of Lantus p.m. at home  - c/w 35U lantus + ISS     ----------------------------------------------------------------------    Heme  - No active issues  - DVT PPX: Lovenox 40 qD.     ---------------------------------------------------------------------    ID  #shock 2/2 likely sepsis with unclear etiology.  #cellulitis LLE   - Febrile. WBC elevated to 19k.  - CT chest with R pleural effusion, with no signs of pneumonia.   - s/p Cefepime and Vanc in the ED.  - Staph positive, MRSA neg   - BC 3/1 NGTD   - Urinalysis negative, chronic R hydronephrosis. CT abd/pelv without infectious source  - ID consulted, appreciate recs  - c/w ceftriaxone at this time, can escalate to vanc if decompensating        ------------------------------------------------------------------------  Ethics  - Full code    Diet   - Regular, consistent carb diet.  This is a 62 year old male with pmh of HFrEF with an EF of 19% status post AICD, CAD status post stents, DM 2, HTN, COPD with a 30+ pack year history of smoking presents to the ED with shortness of breath, vomiting, diarrhea, hypoxia to mid 80s without NC --> 99% on 1L NC, hypotension requiring pressors in setting of fever admitted to MICU for further management. Patient is off pressors, with soft BP. Complicated by renal dysfunction likely i/s/o cardiorenal, continuing with aggressive diuresis.      --------------------------------------------------    Neuro  - A&O x4  - Not on any sedation    ------------------------------------------------------    Cardiovascular  # septic vs hypovolemic shock in setting of HFrEF of 19% s/p AICD  - 3 episode of vomiting, 1 episode of watery diarrhea in setting of poor PO intake, concerning for possible hypovolemic shock.   - On levo gtt, wean as tolerated.   - EKG shows sinus tachycardia  - POCUS shows severe LV systolic dysfunction with plethoric IVC, scattered B lines throughout  - BNP elevated 6243. Trop elevated at 101 likely in setting of demand from septic shock.   - TTE with EF 25% and severe diastolic dysfunction. No valvular pathologies   - No LE pitting edema, per POCUS today does have dilated IVC   - S/p Bumex 4mg IV  - started on Bumex gtt overnight     #HTN  - At home takes, Bumex 2mg tid, Entresto 24/26 BID, Metoprolol  qD, Spironolactone 25mg qD.  - Holding anti-hypertensives for soft BPs, goal systolic > 90   - off levo support   - monitor BP, soft       #CAD   -  HFrEF of 19% s/p AICD, GDMT  - Cont Plavix and aspirin.     -----------------------------------------------------------    Respiratory  # hypoxia in setting of poor perfusion  - on Nasal Cannula 1L, saturating above 98%, wean as tolerated.   - Not acidotic or alkalotic on VBG. Will monitor  - CT angio chest showing no evidence of PE although limited by motion. Small right pleural effusion.   - satting in the 90s on NC     ------------------------------------------------------------------    GI  # Nausea, vomiting, diarrhea  - Zofran as needed  - Will monitor symptoms and electrolytes. Replete as needed    # Severe R hydronephrosis seen on CT chest  - Hx of R hydronephrosis in 2020, currently presentation likely chronic.   - CT abd/pelv w/o contrast with hydro, no sign of abscess/infection     #Transaminitis   Elevated alk phos, LFTs   Likely i/s/o congestive hepatopathy  RUQ without signs of cholecystitis/dilated CBD   - CTM     -----------------------------------------------------------------------    Renal  #MANJIT    #ATN vs worsening cardiorenal vs delayed contrast induced?   #Anion gap metabolic acidosis   Creatinine uptrending 4.8 (baseline 1.5 to 1.8)   Not retaining on bladder scan   Given albumin x2 (repeat POCUS with reduced IVC) --> increased urine output 300cc  S/p 4mg Bumex with minimal output   - placed on bumex drip and started metolazone   - monitor urine output   - nephrology following, no urgent HD at this time   - Will monitor and trend. Avoid nephrotoxins and renally dose meds.     --------------------------------------------------------------------------    Endo  #T2DM  -  40 units of Lantus p.m. at home  - c/w 35U lantus + ISS     ----------------------------------------------------------------------    Heme  - No active issues  - DVT PPX: Lovenox 40 qD.     ---------------------------------------------------------------------    ID  #shock 2/2 likely sepsis with unclear etiology.  #cellulitis LLE   - Febrile. WBC elevated to 19k now downtrending. Likely 2/2 to new cellulitis LLE   - CT chest with R pleural effusion, with no signs of pneumonia.   - s/p Cefepime and Vanc in the ED.  - Staph positive, MRSA neg   - BC 3/1 NGTD   - Urinalysis negative, chronic R hydronephrosis. CT abd/pelv without infectious source  - ID consulted, appreciate recs  - c/w ceftriaxone at this time, can escalate to vanc if decompensating        ------------------------------------------------------------------------  Ethics  - Full code    Diet   - Regular, consistent carb diet.

## 2023-03-04 NOTE — PROGRESS NOTE ADULT - ASSESSMENT
Patient is a 62 year old male with PMHx of CHFrEF more recently 29%, has AICD, cardiomyopathy due to ischemia, Diabetes, COPD who was referred to hospital due to concerns for septic shock.    1) Sepsis with unclear source  - leukocytosis improved, not yet reached defervescence  - cultures no growth to date  - renal function worsening  - Seen by ID, cefepime was narrowed to ceftriaxone, further coverage due to persistent fevers? Follow up ID     2) acute on chronic kidney injury  - creatinine trended down more recently  - IF worsens, nephro started discussion regarding dialysis, no indication for urgent dialysis at this time.   - current etiology suspect cardiorenal, likely precipitated by prerenal disease with onset of sepsis. On further diuresis, metolazone added to bumex gtt    3) CHFrEF  - Off pressors, no ionotropes blood pressure becoming stable  - Added on metolazone for potentiated diuresis  - cardio eval  - EF bowen criteria 25%, severe diastolic dysfunction    4) DMII  - getting better controlled  - On basal insulin and sliding scale.  - Titrate as per blood glucose levels    5) COPD  - Controlled with ERVIN prn  - Few symptoms, minimal exacerbations, and no recent hospitalizations due to exacerbation  - nicotine patches and gum as needed  - No wheezing on exam, however now hypoxia- probably due to chf    6) Right Hydronephrosis  - no evidence of stone, more consistent with chronic outlet obstruction  - Routine bladder scan avoid urinary retention.  - uro follow up outpatient- add finasteride to BPH regimen?      7) Anxiety  - controlled with low dose Xanax at home 0.25mg  - somewhat emotional this AM    8) hyponatremia  -  Started on further diuresis for suspected fluid overload  COntinue to monitor    9)thrombocytopenia  - new redness and swelling in the left lower ext  - Has been on heparin products in the past multiple hospitalizations. - probably not HIT  - routine follow up of platelet count.  - pain control

## 2023-03-04 NOTE — PROGRESS NOTE ADULT - ATTENDING COMMENTS
- Supplemental O2 as needed goal O2 sat 88-94%  - Restart pressors if needed goal MAP >= 65  - Diuresis goal net negative 1-2 L  - Trend Cr, avoid nephrotoxins  - Abx as per ID  - Free water restriction for hyponatremia  - DVT prophylaxis  - Full code  - Continues to require ICU level of care for BP monitoring - Supplemental O2 as needed goal O2 sat 88-94%  - Restart pressors if needed goal MAP >= 65  - Diuresis goal net negative 1-2 L  - Trend Cr, avoid nephrotoxins  - Abx as per ID  - Free water restriction for hyponatremia  - CT legs for worsening leg pain  - Benadryl for pruritic rash  - DVT prophylaxis  - Full code 61 y/o M w/HFrEF, CKD, and chronic foot ulcers presenting with hypotension secondary to severe sepsis with septic shock likely secondary to cellulitis. Hypotension resolved. Likely fluid overload due to acute on chronic HFpEF. New diffuse rash presumably drug rash.     - Supplemental O2 as needed goal O2 sat 88-94%  - Restart pressors if needed goal MAP >= 65  - Diuresis goal net negative 1-2 L  - Trend Cr, avoid nephrotoxins  - Abx as per ID  - Free water restriction for hyponatremia  - CT legs for worsening leg pain  - Benadryl for pruritic rash  - DVT prophylaxis  - Full code 63 y/o M w/HFrEF, CKD, and chronic foot ulcers presenting with hypotension secondary to severe sepsis with septic shock likely secondary to cellulitis. Hypotension resolved. Likely fluid overload due to acute on chronic HFpEF. New diffuse rash presumably drug rash.     - Supplemental O2 as needed goal O2 sat 88-94%  - Restart pressors if needed goal MAP >= 65  - Diuresis goal net negative 1-2 L  - Trend Cr, avoid nephrotoxins  - Continue abx  - Free water restriction for hyponatremia  - DVT prophylaxis  - Full code 61 y/o M w/HFrEF, CKD, and chronic foot ulcers presenting with hypotension secondary to severe sepsis with septic shock likely secondary to cellulitis. Hypotension resolved. MANJIT on CKD likely ATN + venous congestion. Likely fluid overload due to acute on chronic HFpEF. New diffuse rash presumably drug rash.     - Supplemental O2 as needed goal O2 sat 88-94%  - Restart pressors if needed goal MAP >= 65  - Diuresis goal net negative 1-2 L  - Trend Cr, avoid nephrotoxins  - Continue abx  - Free water restriction for hyponatremia  - DVT prophylaxis  - Full code

## 2023-03-05 LAB
ALBUMIN SERPL ELPH-MCNC: 3.2 G/DL — LOW (ref 3.3–5)
ALBUMIN SERPL ELPH-MCNC: 3.5 G/DL — SIGNIFICANT CHANGE UP (ref 3.3–5)
ALBUMIN SERPL ELPH-MCNC: 3.5 G/DL — SIGNIFICANT CHANGE UP (ref 3.3–5)
ALBUMIN SERPL ELPH-MCNC: 3.8 G/DL — SIGNIFICANT CHANGE UP (ref 3.3–5)
ALP SERPL-CCNC: 267 U/L — HIGH (ref 40–120)
ALP SERPL-CCNC: 282 U/L — HIGH (ref 40–120)
ALP SERPL-CCNC: 295 U/L — HIGH (ref 40–120)
ALP SERPL-CCNC: 295 U/L — HIGH (ref 40–120)
ALT FLD-CCNC: 65 U/L — HIGH (ref 10–45)
ALT FLD-CCNC: 73 U/L — HIGH (ref 10–45)
ALT FLD-CCNC: 74 U/L — HIGH (ref 10–45)
ALT FLD-CCNC: 80 U/L — HIGH (ref 10–45)
ANION GAP SERPL CALC-SCNC: 20 MMOL/L — HIGH (ref 5–17)
ANION GAP SERPL CALC-SCNC: 21 MMOL/L — HIGH (ref 5–17)
ANION GAP SERPL CALC-SCNC: 23 MMOL/L — HIGH (ref 5–17)
ANION GAP SERPL CALC-SCNC: 24 MMOL/L — HIGH (ref 5–17)
APTT BLD: 33.8 SEC — SIGNIFICANT CHANGE UP (ref 27.5–35.5)
AST SERPL-CCNC: 40 U/L — SIGNIFICANT CHANGE UP (ref 10–40)
AST SERPL-CCNC: 43 U/L — HIGH (ref 10–40)
AST SERPL-CCNC: 44 U/L — HIGH (ref 10–40)
AST SERPL-CCNC: 54 U/L — HIGH (ref 10–40)
BILIRUB SERPL-MCNC: 0.8 MG/DL — SIGNIFICANT CHANGE UP (ref 0.2–1.2)
BILIRUB SERPL-MCNC: 0.9 MG/DL — SIGNIFICANT CHANGE UP (ref 0.2–1.2)
BUN SERPL-MCNC: 100 MG/DL — HIGH (ref 7–23)
BUN SERPL-MCNC: 106 MG/DL — HIGH (ref 7–23)
BUN SERPL-MCNC: 107 MG/DL — HIGH (ref 7–23)
BUN SERPL-MCNC: 98 MG/DL — HIGH (ref 7–23)
CALCIUM SERPL-MCNC: 10.1 MG/DL — SIGNIFICANT CHANGE UP (ref 8.4–10.5)
CALCIUM SERPL-MCNC: 9 MG/DL — SIGNIFICANT CHANGE UP (ref 8.4–10.5)
CALCIUM SERPL-MCNC: 9.2 MG/DL — SIGNIFICANT CHANGE UP (ref 8.4–10.5)
CALCIUM SERPL-MCNC: 9.4 MG/DL — SIGNIFICANT CHANGE UP (ref 8.4–10.5)
CHLORIDE SERPL-SCNC: 86 MMOL/L — LOW (ref 96–108)
CHLORIDE SERPL-SCNC: 89 MMOL/L — LOW (ref 96–108)
CHLORIDE SERPL-SCNC: 90 MMOL/L — LOW (ref 96–108)
CHLORIDE SERPL-SCNC: 92 MMOL/L — LOW (ref 96–108)
CO2 SERPL-SCNC: 15 MMOL/L — LOW (ref 22–31)
CO2 SERPL-SCNC: 16 MMOL/L — LOW (ref 22–31)
CO2 SERPL-SCNC: 17 MMOL/L — LOW (ref 22–31)
CO2 SERPL-SCNC: 20 MMOL/L — LOW (ref 22–31)
CREAT SERPL-MCNC: 3.54 MG/DL — HIGH (ref 0.5–1.3)
CREAT SERPL-MCNC: 3.55 MG/DL — HIGH (ref 0.5–1.3)
CREAT SERPL-MCNC: 3.67 MG/DL — HIGH (ref 0.5–1.3)
CREAT SERPL-MCNC: 4.36 MG/DL — HIGH (ref 0.5–1.3)
EGFR: 15 ML/MIN/1.73M2 — LOW
EGFR: 18 ML/MIN/1.73M2 — LOW
EGFR: 19 ML/MIN/1.73M2 — LOW
EGFR: 19 ML/MIN/1.73M2 — LOW
GAS PNL BLDV: SIGNIFICANT CHANGE UP
GAS PNL BLDV: SIGNIFICANT CHANGE UP
GLUCOSE BLDC GLUCOMTR-MCNC: 147 MG/DL — HIGH (ref 70–99)
GLUCOSE BLDC GLUCOMTR-MCNC: 161 MG/DL — HIGH (ref 70–99)
GLUCOSE BLDC GLUCOMTR-MCNC: 179 MG/DL — HIGH (ref 70–99)
GLUCOSE SERPL-MCNC: 136 MG/DL — HIGH (ref 70–99)
GLUCOSE SERPL-MCNC: 165 MG/DL — HIGH (ref 70–99)
GLUCOSE SERPL-MCNC: 179 MG/DL — HIGH (ref 70–99)
GLUCOSE SERPL-MCNC: 275 MG/DL — HIGH (ref 70–99)
HCT VFR BLD CALC: 44.7 % — SIGNIFICANT CHANGE UP (ref 39–50)
HGB BLD-MCNC: 13.4 G/DL — SIGNIFICANT CHANGE UP (ref 13–17)
INR BLD: 1.38 RATIO — HIGH (ref 0.88–1.16)
MAGNESIUM SERPL-MCNC: 2.3 MG/DL — SIGNIFICANT CHANGE UP (ref 1.6–2.6)
MAGNESIUM SERPL-MCNC: 2.3 MG/DL — SIGNIFICANT CHANGE UP (ref 1.6–2.6)
MAGNESIUM SERPL-MCNC: 2.4 MG/DL — SIGNIFICANT CHANGE UP (ref 1.6–2.6)
MAGNESIUM SERPL-MCNC: 2.5 MG/DL — SIGNIFICANT CHANGE UP (ref 1.6–2.6)
MCHC RBC-ENTMCNC: 23.3 PG — LOW (ref 27–34)
MCHC RBC-ENTMCNC: 30 GM/DL — LOW (ref 32–36)
MCV RBC AUTO: 77.6 FL — LOW (ref 80–100)
NRBC # BLD: 0 /100 WBCS — SIGNIFICANT CHANGE UP (ref 0–0)
PHOSPHATE SERPL-MCNC: 5.7 MG/DL — HIGH (ref 2.5–4.5)
PHOSPHATE SERPL-MCNC: 5.8 MG/DL — HIGH (ref 2.5–4.5)
PHOSPHATE SERPL-MCNC: 6.4 MG/DL — HIGH (ref 2.5–4.5)
PHOSPHATE SERPL-MCNC: 6.6 MG/DL — HIGH (ref 2.5–4.5)
PLATELET # BLD AUTO: 108 K/UL — LOW (ref 150–400)
POTASSIUM SERPL-MCNC: 3.4 MMOL/L — LOW (ref 3.5–5.3)
POTASSIUM SERPL-MCNC: 3.6 MMOL/L — SIGNIFICANT CHANGE UP (ref 3.5–5.3)
POTASSIUM SERPL-MCNC: 3.9 MMOL/L — SIGNIFICANT CHANGE UP (ref 3.5–5.3)
POTASSIUM SERPL-MCNC: 5.1 MMOL/L — SIGNIFICANT CHANGE UP (ref 3.5–5.3)
POTASSIUM SERPL-SCNC: 3.4 MMOL/L — LOW (ref 3.5–5.3)
POTASSIUM SERPL-SCNC: 3.6 MMOL/L — SIGNIFICANT CHANGE UP (ref 3.5–5.3)
POTASSIUM SERPL-SCNC: 3.9 MMOL/L — SIGNIFICANT CHANGE UP (ref 3.5–5.3)
POTASSIUM SERPL-SCNC: 5.1 MMOL/L — SIGNIFICANT CHANGE UP (ref 3.5–5.3)
PROT SERPL-MCNC: 7.6 G/DL — SIGNIFICANT CHANGE UP (ref 6–8.3)
PROT SERPL-MCNC: 7.9 G/DL — SIGNIFICANT CHANGE UP (ref 6–8.3)
PROTHROM AB SERPL-ACNC: 15.9 SEC — HIGH (ref 10.5–13.4)
RBC # BLD: 5.76 M/UL — SIGNIFICANT CHANGE UP (ref 4.2–5.8)
RBC # FLD: 19.7 % — HIGH (ref 10.3–14.5)
SODIUM SERPL-SCNC: 122 MMOL/L — LOW (ref 135–145)
SODIUM SERPL-SCNC: 129 MMOL/L — LOW (ref 135–145)
SODIUM SERPL-SCNC: 130 MMOL/L — LOW (ref 135–145)
SODIUM SERPL-SCNC: 132 MMOL/L — LOW (ref 135–145)
VANCOMYCIN FLD-MCNC: 9.2 UG/ML — SIGNIFICANT CHANGE UP
WBC # BLD: 17.65 K/UL — HIGH (ref 3.8–10.5)
WBC # FLD AUTO: 17.65 K/UL — HIGH (ref 3.8–10.5)

## 2023-03-05 PROCEDURE — 73700 CT LOWER EXTREMITY W/O DYE: CPT | Mod: 26,50

## 2023-03-05 PROCEDURE — 99233 SBSQ HOSP IP/OBS HIGH 50: CPT | Mod: GC

## 2023-03-05 RX ORDER — DIPHENHYDRAMINE HCL 50 MG
25 CAPSULE ORAL EVERY 6 HOURS
Refills: 0 | Status: DISCONTINUED | OUTPATIENT
Start: 2023-03-05 | End: 2023-03-05

## 2023-03-05 RX ORDER — MEROPENEM 1 G/30ML
1000 INJECTION INTRAVENOUS EVERY 12 HOURS
Refills: 0 | Status: DISCONTINUED | OUTPATIENT
Start: 2023-03-05 | End: 2023-03-07

## 2023-03-05 RX ORDER — HYDROMORPHONE HYDROCHLORIDE 2 MG/ML
0.5 INJECTION INTRAMUSCULAR; INTRAVENOUS; SUBCUTANEOUS ONCE
Refills: 0 | Status: DISCONTINUED | OUTPATIENT
Start: 2023-03-05 | End: 2023-03-05

## 2023-03-05 RX ORDER — POTASSIUM CHLORIDE 20 MEQ
40 PACKET (EA) ORAL EVERY 4 HOURS
Refills: 0 | Status: DISCONTINUED | OUTPATIENT
Start: 2023-03-05 | End: 2023-03-05

## 2023-03-05 RX ORDER — MEROPENEM 1 G/30ML
INJECTION INTRAVENOUS
Refills: 0 | Status: DISCONTINUED | OUTPATIENT
Start: 2023-03-05 | End: 2023-03-07

## 2023-03-05 RX ORDER — ACETAMINOPHEN 500 MG
650 TABLET ORAL EVERY 6 HOURS
Refills: 0 | Status: DISCONTINUED | OUTPATIENT
Start: 2023-03-05 | End: 2023-03-11

## 2023-03-05 RX ORDER — DIPHENHYDRAMINE HCL 50 MG
50 CAPSULE ORAL EVERY 4 HOURS
Refills: 0 | Status: DISCONTINUED | OUTPATIENT
Start: 2023-03-05 | End: 2023-03-11

## 2023-03-05 RX ORDER — DIPHENHYDRAMINE HCL 50 MG
50 CAPSULE ORAL ONCE
Refills: 0 | Status: COMPLETED | OUTPATIENT
Start: 2023-03-05 | End: 2023-03-05

## 2023-03-05 RX ORDER — HYDROCORTISONE 1 %
1 OINTMENT (GRAM) TOPICAL EVERY 6 HOURS
Refills: 0 | Status: DISCONTINUED | OUTPATIENT
Start: 2023-03-05 | End: 2023-03-06

## 2023-03-05 RX ORDER — DIPHENHYDRAMINE HCL 50 MG
25 CAPSULE ORAL ONCE
Refills: 0 | Status: COMPLETED | OUTPATIENT
Start: 2023-03-05 | End: 2023-03-05

## 2023-03-05 RX ORDER — MEROPENEM 1 G/30ML
1000 INJECTION INTRAVENOUS ONCE
Refills: 0 | Status: COMPLETED | OUTPATIENT
Start: 2023-03-05 | End: 2023-03-05

## 2023-03-05 RX ORDER — VANCOMYCIN HCL 1 G
1000 VIAL (EA) INTRAVENOUS ONCE
Refills: 0 | Status: COMPLETED | OUTPATIENT
Start: 2023-03-05 | End: 2023-03-05

## 2023-03-05 RX ORDER — HYDROMORPHONE HYDROCHLORIDE 2 MG/ML
0.5 INJECTION INTRAMUSCULAR; INTRAVENOUS; SUBCUTANEOUS EVERY 6 HOURS
Refills: 0 | Status: DISCONTINUED | OUTPATIENT
Start: 2023-03-05 | End: 2023-03-08

## 2023-03-05 RX ORDER — CALCIUM GLUCONATE 100 MG/ML
2 VIAL (ML) INTRAVENOUS ONCE
Refills: 0 | Status: COMPLETED | OUTPATIENT
Start: 2023-03-05 | End: 2023-03-05

## 2023-03-05 RX ADMIN — HYDROMORPHONE HYDROCHLORIDE 0.5 MILLIGRAM(S): 2 INJECTION INTRAMUSCULAR; INTRAVENOUS; SUBCUTANEOUS at 00:45

## 2023-03-05 RX ADMIN — Medication 25 MILLIGRAM(S): at 20:03

## 2023-03-05 RX ADMIN — CHLORHEXIDINE GLUCONATE 1 APPLICATION(S): 213 SOLUTION TOPICAL at 06:04

## 2023-03-05 RX ADMIN — Medication 1 PATCH: at 06:20

## 2023-03-05 RX ADMIN — Medication 250 MILLIGRAM(S): at 03:49

## 2023-03-05 RX ADMIN — Medication 25 MILLIGRAM(S): at 08:59

## 2023-03-05 RX ADMIN — PANTOPRAZOLE SODIUM 40 MILLIGRAM(S): 20 TABLET, DELAYED RELEASE ORAL at 06:04

## 2023-03-05 RX ADMIN — Medication 1 PATCH: at 11:07

## 2023-03-05 RX ADMIN — MEROPENEM 100 MILLIGRAM(S): 1 INJECTION INTRAVENOUS at 11:34

## 2023-03-05 RX ADMIN — Medication 650 MILLIGRAM(S): at 00:59

## 2023-03-05 RX ADMIN — Medication 50 MILLIGRAM(S): at 14:39

## 2023-03-05 RX ADMIN — HYDROMORPHONE HYDROCHLORIDE 0.5 MILLIGRAM(S): 2 INJECTION INTRAMUSCULAR; INTRAVENOUS; SUBCUTANEOUS at 12:06

## 2023-03-05 RX ADMIN — Medication 650 MILLIGRAM(S): at 18:00

## 2023-03-05 RX ADMIN — HYDROMORPHONE HYDROCHLORIDE 0.5 MILLIGRAM(S): 2 INJECTION INTRAMUSCULAR; INTRAVENOUS; SUBCUTANEOUS at 07:29

## 2023-03-05 RX ADMIN — Medication 1 APPLICATION(S): at 20:43

## 2023-03-05 RX ADMIN — Medication 1 PATCH: at 19:31

## 2023-03-05 RX ADMIN — HEPARIN SODIUM 5000 UNIT(S): 5000 INJECTION INTRAVENOUS; SUBCUTANEOUS at 08:59

## 2023-03-05 RX ADMIN — TAMSULOSIN HYDROCHLORIDE 0.4 MILLIGRAM(S): 0.4 CAPSULE ORAL at 21:20

## 2023-03-05 RX ADMIN — ATORVASTATIN CALCIUM 80 MILLIGRAM(S): 80 TABLET, FILM COATED ORAL at 21:20

## 2023-03-05 RX ADMIN — HYDROMORPHONE HYDROCHLORIDE 0.5 MILLIGRAM(S): 2 INJECTION INTRAMUSCULAR; INTRAVENOUS; SUBCUTANEOUS at 21:32

## 2023-03-05 RX ADMIN — HYDROMORPHONE HYDROCHLORIDE 0.5 MILLIGRAM(S): 2 INJECTION INTRAMUSCULAR; INTRAVENOUS; SUBCUTANEOUS at 00:30

## 2023-03-05 RX ADMIN — CLOPIDOGREL BISULFATE 75 MILLIGRAM(S): 75 TABLET, FILM COATED ORAL at 11:07

## 2023-03-05 RX ADMIN — HYDROMORPHONE HYDROCHLORIDE 0.5 MILLIGRAM(S): 2 INJECTION INTRAMUSCULAR; INTRAVENOUS; SUBCUTANEOUS at 21:00

## 2023-03-05 RX ADMIN — HYDROMORPHONE HYDROCHLORIDE 0.5 MILLIGRAM(S): 2 INJECTION INTRAMUSCULAR; INTRAVENOUS; SUBCUTANEOUS at 11:51

## 2023-03-05 RX ADMIN — Medication 40 MILLIEQUIVALENT(S): at 02:24

## 2023-03-05 RX ADMIN — Medication 2: at 17:13

## 2023-03-05 RX ADMIN — Medication 81 MILLIGRAM(S): at 11:07

## 2023-03-05 RX ADMIN — Medication 2: at 21:21

## 2023-03-05 RX ADMIN — HYDROMORPHONE HYDROCHLORIDE 0.5 MILLIGRAM(S): 2 INJECTION INTRAMUSCULAR; INTRAVENOUS; SUBCUTANEOUS at 20:28

## 2023-03-05 RX ADMIN — INSULIN GLARGINE 35 UNIT(S): 100 INJECTION, SOLUTION SUBCUTANEOUS at 21:20

## 2023-03-05 RX ADMIN — HYDROMORPHONE HYDROCHLORIDE 0.5 MILLIGRAM(S): 2 INJECTION INTRAMUSCULAR; INTRAVENOUS; SUBCUTANEOUS at 07:14

## 2023-03-05 RX ADMIN — HYDROMORPHONE HYDROCHLORIDE 0.5 MILLIGRAM(S): 2 INJECTION INTRAMUSCULAR; INTRAVENOUS; SUBCUTANEOUS at 22:00

## 2023-03-05 RX ADMIN — Medication 1 PATCH: at 11:06

## 2023-03-05 RX ADMIN — MEROPENEM 100 MILLIGRAM(S): 1 INJECTION INTRAVENOUS at 17:14

## 2023-03-05 RX ADMIN — HEPARIN SODIUM 5000 UNIT(S): 5000 INJECTION INTRAVENOUS; SUBCUTANEOUS at 15:19

## 2023-03-05 RX ADMIN — Medication 650 MILLIGRAM(S): at 17:52

## 2023-03-05 RX ADMIN — Medication 650 MILLIGRAM(S): at 02:00

## 2023-03-05 RX ADMIN — BUMETANIDE 5 MG/HR: 0.25 INJECTION INTRAMUSCULAR; INTRAVENOUS at 07:14

## 2023-03-05 RX ADMIN — BUMETANIDE 5 MG/HR: 0.25 INJECTION INTRAMUSCULAR; INTRAVENOUS at 19:50

## 2023-03-05 RX ADMIN — Medication 200 GRAM(S): at 01:13

## 2023-03-05 NOTE — PROGRESS NOTE ADULT - SUBJECTIVE AND OBJECTIVE BOX
INTERVAL HPI/OVERNIGHT EVENTS:    SUBJECTIVE: Patient seen and examined at bedside.       VITAL SIGNS:  ICU Vital Signs Last 24 Hrs  T(C): 37.1 (05 Mar 2023 04:00), Max: 38.2 (04 Mar 2023 13:00)  T(F): 98.8 (05 Mar 2023 04:00), Max: 100.8 (04 Mar 2023 13:00)  HR: 97 (05 Mar 2023 06:00) (96 - 107)  BP: 124/81 (05 Mar 2023 06:00) (88/56 - 144/83)  BP(mean): 96 (05 Mar 2023 06:00) (66 - 107)  ABP: --  ABP(mean): --  RR: 17 (05 Mar 2023 06:00) (16 - 28)  SpO2: 91% (05 Mar 2023 06:00) (90% - 97%)    O2 Parameters below as of 04 Mar 2023 20:00  Patient On (Oxygen Delivery Method): nasal cannula  O2 Flow (L/min): 2          Plateau pressure:   P/F ratio:     03-04 @ 07:01  -  03-05 @ 07:00  --------------------------------------------------------  IN: 725 mL / OUT: 7600 mL / NET: -6875 mL      CAPILLARY BLOOD GLUCOSE      POCT Blood Glucose.: 147 mg/dL (05 Mar 2023 06:08)    ECG:    PHYSICAL EXAM:    General:   HEENT:   Neck:   Respiratory:   Cardiovascular:   Abdomen:   Extremities:  Neurological:    MEDICATIONS:  MEDICATIONS  (STANDING):  aspirin  chewable 81 milliGRAM(s) Oral daily  atorvastatin 80 milliGRAM(s) Oral at bedtime  buMETAnide Infusion 1 mG/Hr (5 mL/Hr) IV Continuous <Continuous>  cefTRIAXone   IVPB 2000 milliGRAM(s) IV Intermittent every 24 hours  chlorhexidine 4% Liquid 1 Application(s) Topical <User Schedule>  clopidogrel Tablet 75 milliGRAM(s) Oral daily  heparin   Injectable 5000 Unit(s) SubCutaneous every 8 hours  insulin glargine Injectable (LANTUS) 35 Unit(s) SubCutaneous at bedtime  insulin lispro (ADMELOG) corrective regimen sliding scale   SubCutaneous Before meals and at bedtime  nicotine -   7 mG/24Hr(s) Patch 1 Patch Transdermal daily  norepinephrine Infusion 0.037 MICROgram(s)/kG/Min (7 mL/Hr) IV Continuous <Continuous>  pantoprazole    Tablet 40 milliGRAM(s) Oral before breakfast  tamsulosin 0.4 milliGRAM(s) Oral at bedtime    MEDICATIONS  (PRN):  acetaminophen     Tablet .. 650 milliGRAM(s) Oral every 6 hours PRN Temp greater or equal to 38C (100.4F), Mild Pain (1 - 3)  acetaminophen     Tablet .. 650 milliGRAM(s) Oral every 6 hours PRN Mild Pain (1 - 3), Moderate Pain (4 - 6)  acetaminophen  Suppository .. 650 milliGRAM(s) Rectal once PRN Temp greater or equal to 38C (100.4F)  albuterol    90 MICROgram(s) HFA Inhaler 2 Puff(s) Inhalation three times a day PRN Shortness of Breath and/or Wheezing  dextrose 50% Injectable 25 Gram(s) IV Push every 15 minutes PRN blood glucose <70  HYDROmorphone  Injectable 0.5 milliGRAM(s) IV Push every 6 hours PRN Severe Pain (7 - 10)      ALLERGIES:  Allergies    Zosyn (Pruritus)    Intolerances        LABS:                        10.6   9.81  )-----------( 87       ( 04 Mar 2023 00:46 )             35.6     03-05    122<L>  |  86<L>  |  98<H>  ----------------------------<  275<H>  5.1   |  15<L>  |  3.54<H>    Ca    10.1      05 Mar 2023 04:32  Phos  6.4     03-05  Mg     2.3     03-05    TPro  7.6  /  Alb  3.2<L>  /  TBili  0.8  /  DBili  x   /  AST  54<H>  /  ALT  73<H>  /  AlkPhos  267<H>  03-05    PT/INR - ( 05 Mar 2023 00:37 )   PT: 15.9 sec;   INR: 1.38 ratio         PTT - ( 05 Mar 2023 00:37 )  PTT:33.8 sec      RADIOLOGY & ADDITIONAL TESTS: Reviewed.   INTERVAL HPI/OVERNIGHT EVENTS:  Overnight, pt complaining of worsening lower extremity pain, moderately controlled with dilaudid. Also reporting full body pruritis and new rash on chest.     VITAL SIGNS:  ICU Vital Signs Last 24 Hrs  T(C): 37.1 (05 Mar 2023 04:00), Max: 38.2 (04 Mar 2023 13:00)  T(F): 98.8 (05 Mar 2023 04:00), Max: 100.8 (04 Mar 2023 13:00)  HR: 97 (05 Mar 2023 06:00) (96 - 107)  BP: 124/81 (05 Mar 2023 06:00) (88/56 - 144/83)  BP(mean): 96 (05 Mar 2023 06:00) (66 - 107)  ABP: --  ABP(mean): --  RR: 17 (05 Mar 2023 06:00) (16 - 28)  SpO2: 91% (05 Mar 2023 06:00) (90% - 97%)    O2 Parameters below as of 04 Mar 2023 20:00  Patient On (Oxygen Delivery Method): nasal cannula  O2 Flow (L/min): 2          Plateau pressure:   P/F ratio:     03-04 @ 07:01  -  03-05 @ 07:00  --------------------------------------------------------  IN: 725 mL / OUT: 7600 mL / NET: -6875 mL      CAPILLARY BLOOD GLUCOSE      POCT Blood Glucose.: 147 mg/dL (05 Mar 2023 06:08)    ECG:    PHYSICAL EXAM:    General: NAD, Speaking on full sentences on Nasal cannula.   HEENT: NCAT.   Cardiac: RRR, warm extremities.   Chest: CTA, no crackles/wheezing bilaterally.   Abdomen: soft, non-distended, no ttp, no rebound or guarding. No CVA tenderness bilaterally.   Extremities: + bilateral peripheral pitting edema, No calf tenderness, No leg size discrepancies  Skin: new LLE erythema, warmth, ttp, RLE kaykay erythema and scab without drainage   MSK: No focal spinal tenderness   Neuro: AAOx3, motor and sensory grossly intact.   Psych: mood and affect appropriate      MEDICATIONS:  MEDICATIONS  (STANDING):  aspirin  chewable 81 milliGRAM(s) Oral daily  atorvastatin 80 milliGRAM(s) Oral at bedtime  buMETAnide Infusion 1 mG/Hr (5 mL/Hr) IV Continuous <Continuous>  cefTRIAXone   IVPB 2000 milliGRAM(s) IV Intermittent every 24 hours  chlorhexidine 4% Liquid 1 Application(s) Topical <User Schedule>  clopidogrel Tablet 75 milliGRAM(s) Oral daily  heparin   Injectable 5000 Unit(s) SubCutaneous every 8 hours  insulin glargine Injectable (LANTUS) 35 Unit(s) SubCutaneous at bedtime  insulin lispro (ADMELOG) corrective regimen sliding scale   SubCutaneous Before meals and at bedtime  nicotine -   7 mG/24Hr(s) Patch 1 Patch Transdermal daily  norepinephrine Infusion 0.037 MICROgram(s)/kG/Min (7 mL/Hr) IV Continuous <Continuous>  pantoprazole    Tablet 40 milliGRAM(s) Oral before breakfast  tamsulosin 0.4 milliGRAM(s) Oral at bedtime    MEDICATIONS  (PRN):  acetaminophen     Tablet .. 650 milliGRAM(s) Oral every 6 hours PRN Temp greater or equal to 38C (100.4F), Mild Pain (1 - 3)  acetaminophen     Tablet .. 650 milliGRAM(s) Oral every 6 hours PRN Mild Pain (1 - 3), Moderate Pain (4 - 6)  acetaminophen  Suppository .. 650 milliGRAM(s) Rectal once PRN Temp greater or equal to 38C (100.4F)  albuterol    90 MICROgram(s) HFA Inhaler 2 Puff(s) Inhalation three times a day PRN Shortness of Breath and/or Wheezing  dextrose 50% Injectable 25 Gram(s) IV Push every 15 minutes PRN blood glucose <70  HYDROmorphone  Injectable 0.5 milliGRAM(s) IV Push every 6 hours PRN Severe Pain (7 - 10)      ALLERGIES:  Allergies    Zosyn (Pruritus)    Intolerances        LABS:                        10.6   9.81  )-----------( 87       ( 04 Mar 2023 00:46 )             35.6     03-05    122<L>  |  86<L>  |  98<H>  ----------------------------<  275<H>  5.1   |  15<L>  |  3.54<H>    Ca    10.1      05 Mar 2023 04:32  Phos  6.4     03-05  Mg     2.3     03-05    TPro  7.6  /  Alb  3.2<L>  /  TBili  0.8  /  DBili  x   /  AST  54<H>  /  ALT  73<H>  /  AlkPhos  267<H>  03-05    PT/INR - ( 05 Mar 2023 00:37 )   PT: 15.9 sec;   INR: 1.38 ratio         PTT - ( 05 Mar 2023 00:37 )  PTT:33.8 sec      RADIOLOGY & ADDITIONAL TESTS: Reviewed.

## 2023-03-05 NOTE — PROGRESS NOTE ADULT - SUBJECTIVE AND OBJECTIVE BOX
[facility]   [phone]  RENAL FOLLOW UP NOTE  --------------------------------------------------------------------------------  Chief Complaint:    24 hour events/subjective:    seen earlier   c/o rash body  and c/o pain  left ankle/leg    PAST HISTORY  --------------------------------------------------------------------------------  No significant changes to PMH, PSH, FHx, SHx, unless otherwise noted    ALLERGIES & MEDICATIONS  --------------------------------------------------------------------------------  Allergies    Zosyn (Pruritus)    Intolerances      Standing Inpatient Medications  aspirin  chewable 81 milliGRAM(s) Oral daily  atorvastatin 80 milliGRAM(s) Oral at bedtime  buMETAnide Infusion 1 mG/Hr IV Continuous <Continuous>  chlorhexidine 4% Liquid 1 Application(s) Topical <User Schedule>  clopidogrel Tablet 75 milliGRAM(s) Oral daily  heparin   Injectable 5000 Unit(s) SubCutaneous every 8 hours  insulin glargine Injectable (LANTUS) 35 Unit(s) SubCutaneous at bedtime  insulin lispro (ADMELOG) corrective regimen sliding scale   SubCutaneous Before meals and at bedtime  meropenem  IVPB      meropenem  IVPB 1000 milliGRAM(s) IV Intermittent every 12 hours  nicotine -   7 mG/24Hr(s) Patch 1 Patch Transdermal daily  pantoprazole    Tablet 40 milliGRAM(s) Oral before breakfast  tamsulosin 0.4 milliGRAM(s) Oral at bedtime    PRN Inpatient Medications  acetaminophen     Tablet .. 650 milliGRAM(s) Oral every 6 hours PRN  acetaminophen     Tablet .. 650 milliGRAM(s) Oral every 6 hours PRN  acetaminophen  Suppository .. 650 milliGRAM(s) Rectal once PRN  albuterol    90 MICROgram(s) HFA Inhaler 2 Puff(s) Inhalation three times a day PRN  dextrose 50% Injectable 25 Gram(s) IV Push every 15 minutes PRN  diphenhydrAMINE 25 milliGRAM(s) Oral every 6 hours PRN  HYDROmorphone  Injectable 0.5 milliGRAM(s) IV Push every 6 hours PRN      REVIEW OF SYSTEMS  --------------------------------------------------------------------------------    Gen: denies  fevers/chills,  CVS: denies chest pain/palpitations  Resp: denies worsening  SOB/Cough  GI: Denies N/V/Abd pain  : Denies dysuri    VITALS/PHYSICAL EXAM  --------------------------------------------------------------------------------  T(C): 37.6 (03-05-23 @ 16:00), Max: 38 (03-05-23 @ 00:00)  HR: 103 (03-05-23 @ 18:00) (97 - 107)  BP: 108/62 (03-05-23 @ 18:00) (107/68 - 144/83)  RR: 17 (03-05-23 @ 18:00) (15 - 28)  SpO2: 96% (03-05-23 @ 18:00) (90% - 96%)  Wt(kg): --        03-04-23 @ 07:01  -  03-05-23 @ 07:00  --------------------------------------------------------  IN: 730 mL / OUT: 7900 mL / NET: -7170 mL    03-05-23 @ 07:01  -  03-05-23 @ 18:06  --------------------------------------------------------  IN: 630 mL / OUT: 3750 mL / NET: -3120 mL      Physical Exam:  	    Gen:  on O2   	Pulm: decrease bs  no rales or ronchi or wheezing  	CV: no JVD. RRR, S1S2; no rub  	Abd: +BS, soft, nontender/nondistended, obese  	UE: Warm, no cyanosis  no clubbing,  no edema;   	LE: Warm, no cyanosis  no clubbing, no edema, RLE erhythema + skin ulcer present on admission as well   	Neuro: alert and oriented.  	Skin:  + rash macular back thoracic and left leg area. Left leg is significantly more erythematous and with left leg edema       LABS/STUDIES  --------------------------------------------------------------------------------              13.4   17.65 >-----------<  108      [03-05-23 @ 10:05]              44.7     129  |  89  |  107  ----------------------------<  165      [03-05-23 @ 17:27]  3.6   |  20  |  3.55        Ca     9.2     [03-05-23 @ 17:27]      Mg     2.3     [03-05-23 @ 17:27]      Phos  5.7     [03-05-23 @ 17:27]    TPro  7.9  /  Alb  3.5  /  TBili  0.8  /  DBili  x   /  AST  40  /  ALT  65  /  AlkPhos  295  [03-05-23 @ 17:27]    PT/INR: PT 15.9 , INR 1.38       [03-05-23 @ 00:37]  PTT: 33.8       [03-05-23 @ 00:37]      Creatinine Trend:  SCr 3.55 [03-05 @ 17:27]  SCr 3.67 [03-05 @ 10:05]  SCr 3.54 [03-05 @ 04:32]  SCr 4.36 [03-05 @ 00:37]  SCr 4.53 [03-04 @ 18:23]              Urinalysis - [03-01-23 @ 18:53]      Color Yellow / Appearance Clear / SG 1.022 / pH 6.0      Gluc 500 mg/dL / Ketone Negative  / Bili Small / Urobili 3 mg/dL       Blood Negative / Protein 100 mg/dl / Leuk Est Negative / Nitrite Negative      RBC 2 / WBC 1 / Hyaline 0 / Gran  / Sq Epi  / Non Sq Epi 0 / Bacteria Negative      Iron 14, TIBC --, %sat --      [03-02-23 @ 01:20]  Ferritin 225      [03-02-23 @ 01:18]  PTH -- (Ca 8.9)      [10-02-22 @ 07:04]   73  HbA1c 8.9      [12-16-19 @ 08:15]  TSH 3.02      [09-30-22 @ 13:45]

## 2023-03-05 NOTE — PROGRESS NOTE ADULT - ATTENDING COMMENTS
- Supplemental O2 as needed goal O2 sat 88-94%  - Diuresis goal net negative 1-2 L  - Trend Cr, avoid nephrotoxins  - Abx as per ID  - Free water restriction for hyponatremia  - DVT prophylaxis  - Full code 63 y/o M w/HFrEF, CKD, and chronic foot ulcers presenting with hypotension secondary to severe sepsis with septic shock likely secondary to cellulitis. Hypotension resolved. Likely fluid overload due to acute on chronic HFpEF. New diffuse rash presumably drug rash.     - Supplemental O2 as needed goal O2 sat 88-94%  - Restart pressors if needed goal MAP >= 65  - Diuresis goal net negative 1-2 L  - Trend Cr, avoid nephrotoxins  - Abx as per ID  - Free water restriction for hyponatremia  - CT legs for worsening leg pain  - Benadryl for pruritic rash  - DVT prophylaxis  - Full code 63 y/o M w/HFrEF, CKD, and chronic foot ulcers presenting with hypotension secondary to severe sepsis with septic shock likely secondary to cellulitis. Hypotension resolved. Likely fluid overload due to acute on chronic HFpEF. New diffuse rash presumably drug rash.     - Supplemental O2 as needed goal O2 sat 88-94%  - Diuresis goal net negative 1-2 L  - Trend Cr, avoid nephrotoxins  - Abx as per ID  - Free water restriction for hyponatremia  - CT legs for worsening leg pain  - Benadryl for pruritic rash  - DVT prophylaxis  - Full code 63 y/o M w/HFrEF, CKD, and chronic foot ulcers presenting with hypotension secondary to severe sepsis with septic shock likely secondary to cellulitis. Hypotension resolved. MANJIT on CKD likely ATN + venous congestion. Likely fluid overload due to acute on chronic HFpEF. New diffuse rash presumably drug rash.     - Supplemental O2 as needed goal O2 sat 88-94%  - Diuresis goal net negative 1-2 L  - Trend Cr, avoid nephrotoxins  - Abx as per ID  - Free water restriction for hyponatremia  - CT legs for worsening leg pain  - Benadryl for pruritic rash  - DVT prophylaxis  - Full code

## 2023-03-05 NOTE — PROGRESS NOTE ADULT - ASSESSMENT
This is a 62 year old male with pmh of HFrEF with an EF of 19% status post AICD, CAD status post stents, DM 2, HTN, COPD with a 30+ pack year history of smoking presents to the ED with shortness of breath, vomiting, diarrhea, hypoxia to mid 80s without NC --> 99% on 1L NC, hypotension requiring pressors in setting of fever admitted to MICU for further management. Patient is off pressors, with soft BP. Complicated by renal dysfunction likely i/s/o cardiorenal, continuing with aggressive diuresis.      --------------------------------------------------    Neuro  - A&O x4  - Not on any sedation    ------------------------------------------------------    Cardiovascular  # septic vs hypovolemic shock in setting of HFrEF of 19% s/p AICD  - 3 episode of vomiting, 1 episode of watery diarrhea in setting of poor PO intake, concerning for possible hypovolemic shock.   - On levo gtt, wean as tolerated.   - EKG shows sinus tachycardia  - POCUS shows severe LV systolic dysfunction with plethoric IVC, scattered B lines throughout  - BNP elevated 6243. Trop elevated at 101 likely in setting of demand from septic shock.   - TTE with EF 25% and severe diastolic dysfunction. No valvular pathologies   - No LE pitting edema, per POCUS today does have dilated IVC   - S/p Bumex 4mg IV  - started on Bumex gtt overnight     #HTN  - At home takes, Bumex 2mg tid, Entresto 24/26 BID, Metoprolol  qD, Spironolactone 25mg qD.  - Holding anti-hypertensives for soft BPs, goal systolic > 90   - off levo support   - monitor BP, soft       #CAD   -  HFrEF of 19% s/p AICD, GDMT  - Cont Plavix and aspirin.     -----------------------------------------------------------    Respiratory  # hypoxia in setting of poor perfusion  - on Nasal Cannula 1L, saturating above 98%, wean as tolerated.   - Not acidotic or alkalotic on VBG. Will monitor  - CT angio chest showing no evidence of PE although limited by motion. Small right pleural effusion.   - satting in the 90s on NC     ------------------------------------------------------------------    GI  # Nausea, vomiting, diarrhea  - Zofran as needed  - Will monitor symptoms and electrolytes. Replete as needed    # Severe R hydronephrosis seen on CT chest  - Hx of R hydronephrosis in 2020, currently presentation likely chronic.   - CT abd/pelv w/o contrast with hydro, no sign of abscess/infection     #Transaminitis   Elevated alk phos, LFTs   Likely i/s/o congestive hepatopathy  RUQ without signs of cholecystitis/dilated CBD   - CTM     -----------------------------------------------------------------------    Renal  #MANJIT    #ATN vs worsening cardiorenal vs delayed contrast induced?   #Anion gap metabolic acidosis   Creatinine uptrending 4.8 (baseline 1.5 to 1.8)   Not retaining on bladder scan   Given albumin x2 (repeat POCUS with reduced IVC) --> increased urine output 300cc  S/p 4mg Bumex with minimal output   - placed on bumex drip and started metolazone   - monitor urine output   - nephrology following, no urgent HD at this time   - Will monitor and trend. Avoid nephrotoxins and renally dose meds.     --------------------------------------------------------------------------    Endo  #T2DM  -  40 units of Lantus p.m. at home  - c/w 35U lantus + ISS     ----------------------------------------------------------------------    Heme  - No active issues  - DVT PPX: Lovenox 40 qD.     ---------------------------------------------------------------------    ID  #shock 2/2 likely sepsis with unclear etiology.  #cellulitis LLE   - Febrile. WBC elevated to 19k now downtrending. Likely 2/2 to new cellulitis LLE   - CT chest with R pleural effusion, with no signs of pneumonia.   - s/p Cefepime and Vanc in the ED.  - Staph positive, MRSA neg   - BC 3/1 NGTD   - Urinalysis negative, chronic R hydronephrosis. CT abd/pelv without infectious source  - ID consulted, appreciate recs  - c/w ceftriaxone at this time, can escalate to vanc if decompensating        ------------------------------------------------------------------------  Ethics  - Full code    Diet   - Regular, consistent carb diet.  This is a 62 year old male with pmh of HFrEF with an EF of 19% status post AICD, CAD status post stents, DM 2, HTN, COPD with a 30+ pack year history of smoking presents to the ED with shortness of breath, vomiting, diarrhea, hypoxia to mid 80s without NC --> 99% on 1L NC, hypotension requiring pressors in setting of fever admitted to MICU for further management. Patient is off pressors, with soft BP. Complicated by renal dysfunction likely i/s/o cardiorenal, continuing with aggressive diuresis.      --------------------------------------------------    Neuro  - A&O x4  - Not on any sedation    ------------------------------------------------------    Cardiovascular  # septic vs hypovolemic shock in setting of HFrEF of 19% s/p AICD  - 3 episode of vomiting, 1 episode of watery diarrhea in setting of poor PO intake, concerning for possible hypovolemic shock.   - On levo gtt, wean as tolerated.   - EKG shows sinus tachycardia  - POCUS shows severe LV systolic dysfunction with plethoric IVC, scattered B lines throughout  - BNP elevated 6243. Trop elevated at 101 likely in setting of demand from septic shock.   - TTE with EF 25% and severe diastolic dysfunction. No valvular pathologies   - No LE pitting edema, per POCUS today does have dilated IVC   - d/c bumex    #HTN  - At home takes, Bumex 2mg tid, Entresto 24/26 BID, Metoprolol  qD, Spironolactone 25mg qD.  - Holding anti-hypertensives for soft BPs, goal systolic > 90   - off levo support   - monitor BP, soft       #CAD   -  HFrEF of 19% s/p AICD, GDMT  - Cont Plavix and aspirin.     -----------------------------------------------------------    Respiratory  # hypoxia in setting of poor perfusion  - on Nasal Cannula 1L, saturating above 98%, wean as tolerated.   - Not acidotic or alkalotic on VBG. Will monitor  - CT angio chest showing no evidence of PE although limited by motion. Small right pleural effusion.   - satting in the 90s on NC     ------------------------------------------------------------------    GI  # Nausea, vomiting, diarrhea  - Zofran as needed  - Will monitor symptoms and electrolytes. Replete as needed    # Severe R hydronephrosis seen on CT chest  - Hx of R hydronephrosis in 2020, currently presentation likely chronic.   - CT abd/pelv w/o contrast with hydro, no sign of abscess/infection     #Transaminitis   Elevated alk phos, LFTs   Likely i/s/o congestive hepatopathy  RUQ without signs of cholecystitis/dilated CBD   - CTM     -----------------------------------------------------------------------    Renal  #MANJIT    #ATN vs worsening cardiorenal vs delayed contrast induced?   #Anion gap metabolic acidosis   Creatinine uptrending 4.8 (baseline 1.5 to 1.8)   Not retaining on bladder scan   Given albumin x2 (repeat POCUS with reduced IVC) --> increased urine output 300cc  - d/c bumex  - monitor urine output   - nephrology following, no urgent HD at this time   - Will monitor and trend. Avoid nephrotoxins and renally dose meds.     --------------------------------------------------------------------------    Endo  #T2DM  -  40 units of Lantus p.m. at home  - c/w 35U lantus + ISS     ----------------------------------------------------------------------    Heme  - No active issues  - DVT PPX: Lovenox 40 qD.     ---------------------------------------------------------------------    ID  #shock 2/2 likely sepsis with unclear etiology.  #cellulitis LLE   - Febrile. WBC elevated to 19k now downtrending. Likely 2/2 to new cellulitis LLE   - CT chest with R pleural effusion, with no signs of pneumonia.   - s/p Cefepime and Vanc in the ED.  - Staph positive, MRSA neg   - BC 3/1 NGTD   - Urinalysis negative, chronic R hydronephrosis. CT abd/pelv without infectious source  - ID consulted, appreciate recs  - likely allergic rx to ceftriaxone + worsening cellulitis --> broadened to meropenem  - pending CT legs      ------------------------------------------------------------------------  Ethics  - Full code    Diet   - Regular, consistent carb diet.

## 2023-03-05 NOTE — PROGRESS NOTE ADULT - ASSESSMENT
62 year old male with pmh of HFrEF with an EF of 19% status post AICD, CAD status post stents, DM2, HTN, COPD with a 30+ pack year history of smoking presents to the ED with shortness of breath.       1- MANJIT on CKD i,e ATN   2- sepsis  3- CHF  4- DM2  5- chronic R hydro  6- Left leg cellulitis    MANJIT on ckd III in setting of sepsis/fevers/hypotension   has hx of cardiomyopathy with ef < 20 %   he has done well with bumex drip and uo 7900 cc in 24 hr    dc bumex drip   suspect possible cephalosporin rash on thorax   left leg erythema is worsening and is concerning for worsening infection   increase abx coverage.   cr is improving   diurese ptn   cont O2   insulin s-s-   strict I/O  pan culture  NGTD   d/w ICU team when seen earlier.

## 2023-03-06 LAB
ALBUMIN SERPL ELPH-MCNC: 3.4 G/DL — SIGNIFICANT CHANGE UP (ref 3.3–5)
ALBUMIN SERPL ELPH-MCNC: 3.5 G/DL — SIGNIFICANT CHANGE UP (ref 3.3–5)
ALBUMIN SERPL ELPH-MCNC: 3.6 G/DL — SIGNIFICANT CHANGE UP (ref 3.3–5)
ALP SERPL-CCNC: 297 U/L — HIGH (ref 40–120)
ALP SERPL-CCNC: 313 U/L — HIGH (ref 40–120)
ALP SERPL-CCNC: 323 U/L — HIGH (ref 40–120)
ALT FLD-CCNC: 54 U/L — HIGH (ref 10–45)
ALT FLD-CCNC: 59 U/L — HIGH (ref 10–45)
ALT FLD-CCNC: 60 U/L — HIGH (ref 10–45)
ANION GAP SERPL CALC-SCNC: 21 MMOL/L — HIGH (ref 5–17)
ANION GAP SERPL CALC-SCNC: 21 MMOL/L — HIGH (ref 5–17)
ANION GAP SERPL CALC-SCNC: 22 MMOL/L — HIGH (ref 5–17)
APTT BLD: 32.3 SEC — SIGNIFICANT CHANGE UP (ref 27.5–35.5)
AST SERPL-CCNC: 35 U/L — SIGNIFICANT CHANGE UP (ref 10–40)
AST SERPL-CCNC: 36 U/L — SIGNIFICANT CHANGE UP (ref 10–40)
AST SERPL-CCNC: 37 U/L — SIGNIFICANT CHANGE UP (ref 10–40)
BASOPHILS # BLD AUTO: 0.05 K/UL — SIGNIFICANT CHANGE UP (ref 0–0.2)
BASOPHILS NFR BLD AUTO: 0.3 % — SIGNIFICANT CHANGE UP (ref 0–2)
BILIRUB SERPL-MCNC: 0.7 MG/DL — SIGNIFICANT CHANGE UP (ref 0.2–1.2)
BILIRUB SERPL-MCNC: 0.8 MG/DL — SIGNIFICANT CHANGE UP (ref 0.2–1.2)
BILIRUB SERPL-MCNC: 0.8 MG/DL — SIGNIFICANT CHANGE UP (ref 0.2–1.2)
BUN SERPL-MCNC: 112 MG/DL — HIGH (ref 7–23)
BUN SERPL-MCNC: 112 MG/DL — HIGH (ref 7–23)
BUN SERPL-MCNC: 113 MG/DL — HIGH (ref 7–23)
CALCIUM SERPL-MCNC: 9.2 MG/DL — SIGNIFICANT CHANGE UP (ref 8.4–10.5)
CALCIUM SERPL-MCNC: 9.5 MG/DL — SIGNIFICANT CHANGE UP (ref 8.4–10.5)
CALCIUM SERPL-MCNC: 9.7 MG/DL — SIGNIFICANT CHANGE UP (ref 8.4–10.5)
CHLORIDE SERPL-SCNC: 84 MMOL/L — LOW (ref 96–108)
CHLORIDE SERPL-SCNC: 86 MMOL/L — LOW (ref 96–108)
CHLORIDE SERPL-SCNC: 88 MMOL/L — LOW (ref 96–108)
CK SERPL-CCNC: 362 U/L — HIGH (ref 30–200)
CK SERPL-CCNC: 369 U/L — HIGH (ref 30–200)
CO2 SERPL-SCNC: 20 MMOL/L — LOW (ref 22–31)
CO2 SERPL-SCNC: 20 MMOL/L — LOW (ref 22–31)
CO2 SERPL-SCNC: 21 MMOL/L — LOW (ref 22–31)
CREAT SERPL-MCNC: 3.27 MG/DL — HIGH (ref 0.5–1.3)
CREAT SERPL-MCNC: 3.35 MG/DL — HIGH (ref 0.5–1.3)
CREAT SERPL-MCNC: 3.53 MG/DL — HIGH (ref 0.5–1.3)
CULTURE RESULTS: SIGNIFICANT CHANGE UP
CULTURE RESULTS: SIGNIFICANT CHANGE UP
EGFR: 19 ML/MIN/1.73M2 — LOW
EGFR: 20 ML/MIN/1.73M2 — LOW
EGFR: 21 ML/MIN/1.73M2 — LOW
EOSINOPHIL # BLD AUTO: 0.42 K/UL — SIGNIFICANT CHANGE UP (ref 0–0.5)
EOSINOPHIL NFR BLD AUTO: 2.3 % — SIGNIFICANT CHANGE UP (ref 0–6)
GAS PNL BLDV: SIGNIFICANT CHANGE UP
GLUCOSE BLDC GLUCOMTR-MCNC: 157 MG/DL — HIGH (ref 70–99)
GLUCOSE BLDC GLUCOMTR-MCNC: 173 MG/DL — HIGH (ref 70–99)
GLUCOSE BLDC GLUCOMTR-MCNC: 232 MG/DL — HIGH (ref 70–99)
GLUCOSE SERPL-MCNC: 144 MG/DL — HIGH (ref 70–99)
GLUCOSE SERPL-MCNC: 169 MG/DL — HIGH (ref 70–99)
GLUCOSE SERPL-MCNC: 178 MG/DL — HIGH (ref 70–99)
GRAM STN FLD: SIGNIFICANT CHANGE UP
HCT VFR BLD CALC: 43.2 % — SIGNIFICANT CHANGE UP (ref 39–50)
HGB BLD-MCNC: 13.4 G/DL — SIGNIFICANT CHANGE UP (ref 13–17)
IMM GRANULOCYTES NFR BLD AUTO: 1.2 % — HIGH (ref 0–0.9)
INR BLD: 1.36 RATIO — HIGH (ref 0.88–1.16)
LYMPHOCYTES # BLD AUTO: 0.56 K/UL — LOW (ref 1–3.3)
LYMPHOCYTES # BLD AUTO: 3.1 % — LOW (ref 13–44)
MAGNESIUM SERPL-MCNC: 2.3 MG/DL — SIGNIFICANT CHANGE UP (ref 1.6–2.6)
MAGNESIUM SERPL-MCNC: 2.4 MG/DL — SIGNIFICANT CHANGE UP (ref 1.6–2.6)
MCHC RBC-ENTMCNC: 23.5 PG — LOW (ref 27–34)
MCHC RBC-ENTMCNC: 31 GM/DL — LOW (ref 32–36)
MCV RBC AUTO: 75.8 FL — LOW (ref 80–100)
MONOCYTES # BLD AUTO: 1.13 K/UL — HIGH (ref 0–0.9)
MONOCYTES NFR BLD AUTO: 6.2 % — SIGNIFICANT CHANGE UP (ref 2–14)
NEUTROPHILS # BLD AUTO: 15.97 K/UL — HIGH (ref 1.8–7.4)
NEUTROPHILS NFR BLD AUTO: 86.9 % — HIGH (ref 43–77)
NRBC # BLD: 0 /100 WBCS — SIGNIFICANT CHANGE UP (ref 0–0)
PHOSPHATE SERPL-MCNC: 4.6 MG/DL — HIGH (ref 2.5–4.5)
PHOSPHATE SERPL-MCNC: 5.4 MG/DL — HIGH (ref 2.5–4.5)
PLATELET # BLD AUTO: 126 K/UL — LOW (ref 150–400)
POTASSIUM SERPL-MCNC: 3.1 MMOL/L — LOW (ref 3.5–5.3)
POTASSIUM SERPL-MCNC: 3.2 MMOL/L — LOW (ref 3.5–5.3)
POTASSIUM SERPL-MCNC: 4.2 MMOL/L — SIGNIFICANT CHANGE UP (ref 3.5–5.3)
POTASSIUM SERPL-SCNC: 3.1 MMOL/L — LOW (ref 3.5–5.3)
POTASSIUM SERPL-SCNC: 3.2 MMOL/L — LOW (ref 3.5–5.3)
POTASSIUM SERPL-SCNC: 4.2 MMOL/L — SIGNIFICANT CHANGE UP (ref 3.5–5.3)
PROT SERPL-MCNC: 7.9 G/DL — SIGNIFICANT CHANGE UP (ref 6–8.3)
PROT SERPL-MCNC: 8 G/DL — SIGNIFICANT CHANGE UP (ref 6–8.3)
PROT SERPL-MCNC: 8.2 G/DL — SIGNIFICANT CHANGE UP (ref 6–8.3)
PROTHROM AB SERPL-ACNC: 15.8 SEC — HIGH (ref 10.5–13.4)
RBC # BLD: 5.7 M/UL — SIGNIFICANT CHANGE UP (ref 4.2–5.8)
RBC # FLD: 19 % — HIGH (ref 10.3–14.5)
SODIUM SERPL-SCNC: 126 MMOL/L — LOW (ref 135–145)
SODIUM SERPL-SCNC: 127 MMOL/L — LOW (ref 135–145)
SODIUM SERPL-SCNC: 130 MMOL/L — LOW (ref 135–145)
SPECIMEN SOURCE: SIGNIFICANT CHANGE UP
VANCOMYCIN FLD-MCNC: 12.5 UG/ML — SIGNIFICANT CHANGE UP
WBC # BLD: 18.35 K/UL — HIGH (ref 3.8–10.5)
WBC # FLD AUTO: 18.35 K/UL — HIGH (ref 3.8–10.5)

## 2023-03-06 PROCEDURE — 99232 SBSQ HOSP IP/OBS MODERATE 35: CPT | Mod: GC

## 2023-03-06 RX ORDER — SOD,AMMONIUM,POTASSIUM LACTATE
1 CREAM (GRAM) TOPICAL
Refills: 0 | Status: DISCONTINUED | OUTPATIENT
Start: 2023-03-06 | End: 2023-03-11

## 2023-03-06 RX ORDER — POTASSIUM CHLORIDE 20 MEQ
40 PACKET (EA) ORAL EVERY 4 HOURS
Refills: 0 | Status: DISCONTINUED | OUTPATIENT
Start: 2023-03-06 | End: 2023-03-06

## 2023-03-06 RX ORDER — HYDROCORTISONE 1 %
1 OINTMENT (GRAM) TOPICAL EVERY 6 HOURS
Refills: 0 | Status: DISCONTINUED | OUTPATIENT
Start: 2023-03-06 | End: 2023-03-06

## 2023-03-06 RX ORDER — POTASSIUM CHLORIDE 20 MEQ
10 PACKET (EA) ORAL
Refills: 0 | Status: DISCONTINUED | OUTPATIENT
Start: 2023-03-06 | End: 2023-03-06

## 2023-03-06 RX ORDER — POTASSIUM CHLORIDE 20 MEQ
40 PACKET (EA) ORAL EVERY 4 HOURS
Refills: 0 | Status: COMPLETED | OUTPATIENT
Start: 2023-03-06 | End: 2023-03-06

## 2023-03-06 RX ORDER — HYDROCORTISONE 1 %
1 OINTMENT (GRAM) TOPICAL EVERY 6 HOURS
Refills: 0 | Status: DISCONTINUED | OUTPATIENT
Start: 2023-03-06 | End: 2023-03-07

## 2023-03-06 RX ORDER — VANCOMYCIN HCL 1 G
1000 VIAL (EA) INTRAVENOUS ONCE
Refills: 0 | Status: COMPLETED | OUTPATIENT
Start: 2023-03-06 | End: 2023-03-06

## 2023-03-06 RX ADMIN — ATORVASTATIN CALCIUM 80 MILLIGRAM(S): 80 TABLET, FILM COATED ORAL at 21:16

## 2023-03-06 RX ADMIN — CLOPIDOGREL BISULFATE 75 MILLIGRAM(S): 75 TABLET, FILM COATED ORAL at 11:20

## 2023-03-06 RX ADMIN — HEPARIN SODIUM 5000 UNIT(S): 5000 INJECTION INTRAVENOUS; SUBCUTANEOUS at 23:57

## 2023-03-06 RX ADMIN — HYDROMORPHONE HYDROCHLORIDE 0.5 MILLIGRAM(S): 2 INJECTION INTRAMUSCULAR; INTRAVENOUS; SUBCUTANEOUS at 18:10

## 2023-03-06 RX ADMIN — HYDROMORPHONE HYDROCHLORIDE 0.5 MILLIGRAM(S): 2 INJECTION INTRAMUSCULAR; INTRAVENOUS; SUBCUTANEOUS at 05:30

## 2023-03-06 RX ADMIN — Medication 650 MILLIGRAM(S): at 23:45

## 2023-03-06 RX ADMIN — Medication 50 MILLIGRAM(S): at 07:55

## 2023-03-06 RX ADMIN — Medication 1 APPLICATION(S): at 09:23

## 2023-03-06 RX ADMIN — HEPARIN SODIUM 5000 UNIT(S): 5000 INJECTION INTRAVENOUS; SUBCUTANEOUS at 09:05

## 2023-03-06 RX ADMIN — Medication 40 MILLIEQUIVALENT(S): at 05:06

## 2023-03-06 RX ADMIN — TAMSULOSIN HYDROCHLORIDE 0.4 MILLIGRAM(S): 0.4 CAPSULE ORAL at 21:16

## 2023-03-06 RX ADMIN — PANTOPRAZOLE SODIUM 40 MILLIGRAM(S): 20 TABLET, DELAYED RELEASE ORAL at 06:02

## 2023-03-06 RX ADMIN — Medication 50 MILLIGRAM(S): at 19:26

## 2023-03-06 RX ADMIN — Medication 40 MILLIEQUIVALENT(S): at 02:29

## 2023-03-06 RX ADMIN — HYDROMORPHONE HYDROCHLORIDE 0.5 MILLIGRAM(S): 2 INJECTION INTRAMUSCULAR; INTRAVENOUS; SUBCUTANEOUS at 05:07

## 2023-03-06 RX ADMIN — INSULIN GLARGINE 35 UNIT(S): 100 INJECTION, SOLUTION SUBCUTANEOUS at 21:16

## 2023-03-06 RX ADMIN — Medication 2: at 17:30

## 2023-03-06 RX ADMIN — Medication 650 MILLIGRAM(S): at 22:45

## 2023-03-06 RX ADMIN — Medication 1 PATCH: at 20:56

## 2023-03-06 RX ADMIN — HYDROMORPHONE HYDROCHLORIDE 0.5 MILLIGRAM(S): 2 INJECTION INTRAMUSCULAR; INTRAVENOUS; SUBCUTANEOUS at 11:45

## 2023-03-06 RX ADMIN — Medication 81 MILLIGRAM(S): at 11:19

## 2023-03-06 RX ADMIN — Medication 2: at 21:17

## 2023-03-06 RX ADMIN — Medication 1 PATCH: at 11:18

## 2023-03-06 RX ADMIN — Medication 4: at 12:25

## 2023-03-06 RX ADMIN — HYDROMORPHONE HYDROCHLORIDE 0.5 MILLIGRAM(S): 2 INJECTION INTRAMUSCULAR; INTRAVENOUS; SUBCUTANEOUS at 11:25

## 2023-03-06 RX ADMIN — HEPARIN SODIUM 5000 UNIT(S): 5000 INJECTION INTRAVENOUS; SUBCUTANEOUS at 00:56

## 2023-03-06 RX ADMIN — MEROPENEM 100 MILLIGRAM(S): 1 INJECTION INTRAVENOUS at 05:06

## 2023-03-06 RX ADMIN — MEROPENEM 100 MILLIGRAM(S): 1 INJECTION INTRAVENOUS at 17:30

## 2023-03-06 RX ADMIN — HEPARIN SODIUM 5000 UNIT(S): 5000 INJECTION INTRAVENOUS; SUBCUTANEOUS at 17:29

## 2023-03-06 RX ADMIN — Medication 50 MILLIGRAM(S): at 02:30

## 2023-03-06 RX ADMIN — Medication 40 MILLIEQUIVALENT(S): at 14:03

## 2023-03-06 RX ADMIN — Medication 40 MILLIEQUIVALENT(S): at 11:19

## 2023-03-06 RX ADMIN — Medication 250 MILLIGRAM(S): at 02:28

## 2023-03-06 RX ADMIN — CHLORHEXIDINE GLUCONATE 1 APPLICATION(S): 213 SOLUTION TOPICAL at 05:07

## 2023-03-06 RX ADMIN — Medication 1 APPLICATION(S): at 17:06

## 2023-03-06 RX ADMIN — Medication 1 PATCH: at 11:20

## 2023-03-06 RX ADMIN — Medication 2: at 08:30

## 2023-03-06 RX ADMIN — Medication 1 PATCH: at 07:50

## 2023-03-06 RX ADMIN — HYDROMORPHONE HYDROCHLORIDE 0.5 MILLIGRAM(S): 2 INJECTION INTRAMUSCULAR; INTRAVENOUS; SUBCUTANEOUS at 17:53

## 2023-03-06 NOTE — ASSESSMENT
[FreeTextEntry1] : 62 M with RIght foot plantar submet 3rd wound to subQ, left dorsal 5th PIPJ wound to subq\par - Pt seen and evaluated.\par - Right foot submet 3 wound down to subQ surrounded by hyperkeratotic skin, no drainage, no signs of infection.\par - left foot 5th digit dorsal wound to subQ, with hyperkeratotic borders, mildly undermined, no signs of infection \par - new anterior right tibia abrasion to dermis, no acute signs of infection\par - sharp excisional debridement to the B/L wounds to the level of and not beyond subQ using# 15 blade\par - Pt to continue wearing NB shoes with supporting insoles at at times \par - Wound care with mupirocin and DSD daily to bilateral wounds \par - RTC in 2 weeks.

## 2023-03-06 NOTE — PROGRESS NOTE ADULT - SUBJECTIVE AND OBJECTIVE BOX
[facility]  Llu Cruz  Office: [phone]  Cell: [phone]  Can also be reached on [employer] Teams     INTERVAL HPI/OVERNIGHT EVENTS: Seen and examined sitting on chair mode after having finished breakfast. No new complaints, however his left lower extremity is still in excruciating pain. Rash stable, still itchy. Offers no SOB or chest pain, no palpitations. No documented fevers x 24 hours. Now off bumex gtt, diuresing well.      REVIEW OF SYSTEMS:  Negative except per HPI    Vital Signs Last 24 Hrs  T(C): 36.9 (06 Mar 2023 10:00), Max: 37.6 (05 Mar 2023 16:00)  T(F): 98.4 (06 Mar 2023 10:00), Max: 99.7 (05 Mar 2023 16:00)  HR: 106 (06 Mar 2023 10:00) (100 - 111)  BP: 107/69 (06 Mar 2023 10:00) (100/69 - 134/74)  BP(mean): 83 (06 Mar 2023 10:00) (75 - 98)  RR: 20 (06 Mar 2023 10:00) (13 - 28)  SpO2: 95% (06 Mar 2023 10:00) (92% - 96%)    Parameters below as of 06 Mar 2023 08:00  Patient On (Oxygen Delivery Method): nasal cannula  O2 Flow (L/min): 2      PHYSICAL EXAMINATION:  GENERAL: NAD, AAOx4 to person place time situation, no confusion  HEAD:  Atraumatic, Normocephalic  EYES:  conjunctiva and sclera clear  NECK: Supple, Normal thyroid  CHEST/LUNG: Clear to auscultation. no wheezing, diminished breath sounds  HEART: Regular tachycardia  ABDOMEN: Soft, Nontender, Ndistended, bowel sounds present  NERVOUS SYSTEM:  Alert & Oriented X3,  moving extremities spontaneously, no focal deficits  EXTREMITIES:  2+ Peripheral Pulses, No pitting edema, has an ulcer on the right, leg, ulcer appears to be healing, new left lower extremity tenderness erythema and increased swelling at the ankle. 2+ palpable pulses, doppler performed by RN was reportedly normal.  SKIN: generalized rash in the back and chest                        13.4   18.35 )-----------( 126      ( 06 Mar 2023 00:38 )             43.2     03-06    126<L>  |  84<L>  |  112<H>  ----------------------------<  178<H>  3.2<L>   |  21<L>  |  3.35<H>    Ca    9.5      06 Mar 2023 07:38  Phos  5.4     03-06  Mg     2.3     03-06    TPro  8.2  /  Alb  3.6  /  TBili  0.8  /  DBili  x   /  AST  35  /  ALT  59<H>  /  AlkPhos  323<H>  03-06    LIVER FUNCTIONS - ( 06 Mar 2023 07:38 )  Alb: 3.6 g/dL / Pro: 8.2 g/dL / ALK PHOS: 323 U/L / ALT: 59 U/L / AST: 35 U/L / GGT: x           CARDIAC MARKERS ( 06 Mar 2023 07:38 )  x     / x     / 362 U/L / x     / x          PT/INR - ( 06 Mar 2023 00:38 )   PT: 15.8 sec;   INR: 1.36 ratio         PTT - ( 06 Mar 2023 00:38 )  PTT:32.3 sec    CAPILLARY BLOOD GLUCOSE      RADIOLOGY & ADDITIONAL TESTS:

## 2023-03-06 NOTE — PROGRESS NOTE ADULT - ASSESSMENT
This is a 62 year old male with pmh of HFrEF with an EF of 19% status post AICD, CAD status post stents, DM 2, HTN, COPD with a 30+ pack year history of smoking presents to the ED with shortness of breath, vomiting, diarrhea, hypoxia to mid 80s without NC --> 99% on 1L NC, hypotension requiring pressors in setting of fever admitted to MICU for further management. Patient is off pressors, with soft BP. Complicated by renal dysfunction likely i/s/o cardiorenal, bumex gtt turned off. Developed erythematous, blanching rash may be 2/2 to drug reaction.     --------------------------------------------------    Neuro  - A&O x4  - Not on any sedation    ------------------------------------------------------    Cardiovascular  # septic vs hypovolemic shock in setting of HFrEF of 19% s/p AICD  - 3 episode of vomiting, 1 episode of watery diarrhea in setting of poor PO intake, concerning for possible hypovolemic shock.   - On levo gtt, wean as tolerated.   - EKG shows sinus tachycardia  - POCUS shows severe LV systolic dysfunction with plethoric IVC, scattered B lines throughout  - BNP elevated 6243. Trop elevated at 101 likely in setting of demand from septic shock.   - TTE with EF 25% and severe diastolic dysfunction. No valvular pathologies   - No LE pitting edema, per POCUS today does have dilated IVC   - d/c bumex    #HTN  - At home takes, Bumex 2mg tid, Entresto 24/26 BID, Metoprolol  qD, Spironolactone 25mg qD.  - Holding anti-hypertensives for soft BPs, goal systolic > 90   - off levo support   - monitor BP, soft       #CAD   -  HFrEF of 19% s/p AICD, GDMT  - Cont Plavix and aspirin.     -----------------------------------------------------------    Respiratory  # hypoxia in setting of poor perfusion  - on Nasal Cannula 1L, saturating above 98%, wean as tolerated.   - Not acidotic or alkalotic on VBG. Will monitor  - CT angio chest showing no evidence of PE although limited by motion. Small right pleural effusion.   - satting in the 90s on NC   - c/w ICS     ------------------------------------------------------------------    GI  # Nausea, vomiting, diarrhea  - Zofran as needed  - Will monitor symptoms and electrolytes. Replete as needed    # Severe R hydronephrosis seen on CT chest  - Hx of R hydronephrosis in 2020, currently presentation likely chronic.   - CT abd/pelv w/o contrast with hydro, no sign of abscess/infection     #Transaminitis   Elevated alk phos, LFTs   Likely i/s/o congestive hepatopathy  RUQ without signs of cholecystitis/dilated CBD   - CTM     -----------------------------------------------------------------------    Renal  #MANJIT    #ATN vs worsening cardiorenal vs delayed contrast induced?   #Anion gap metabolic acidosis   Creatinine uptrending 4.8 (baseline 1.5 to 1.8)   Not retaining on bladder scan   Given albumin x2 (repeat POCUS with reduced IVC) --> increased urine output 300cc  - d/c bumex  - monitor urine output   - nephrology following, no urgent HD at this time   - Will monitor and trend. Avoid nephrotoxins and renally dose meds.     --------------------------------------------------------------------------    Endo  #T2DM  -  40 units of Lantus p.m. at home  - c/w 35U lantus + ISS     ----------------------------------------------------------------------    Heme  - No active issues  - DVT PPX: Lovenox 40 qD.     ---------------------------------------------------------------------    ID  #shock 2/2 likely sepsis with unclear etiology.  #cellulitis LLE   - Febrile. WBC elevated to 19k now downtrending. Likely 2/2 to new cellulitis LLE   - CT chest with R pleural effusion, with no signs of pneumonia.   - s/p Cefepime and Vanc in the ED.  - Staph positive, MRSA neg   - BC 3/1 NGTD   - Urinalysis negative, chronic R hydronephrosis. CT abd/pelv without infectious source  - ID consulted, appreciate recs  - likely allergic rx to ceftriaxone + worsening cellulitis --> broadened to meropenem and vanc  - CT leg with circumferential edema and b/l cellulitis?  - CK elevated at 300, repeat CK pending       ------------------------------------------------------------------------  Ethics  - Full code    Diet   - Regular, consistent carb diet.

## 2023-03-06 NOTE — HISTORY OF PRESENT ILLNESS
[FreeTextEntry1] : The patient is seen for follow-up of submet 3 wound of the right foot and left 5th digit wound.  Pt is applying mupirocin to wounds daily. Pt denies any F/C/N/V/SOB or other complaints\par \par FBG 2/27 152 mg/dL

## 2023-03-06 NOTE — PROGRESS NOTE ADULT - SUBJECTIVE AND OBJECTIVE BOX
[facility]   [phone]  RENAL FOLLOW UP NOTE  --------------------------------------------------------------------------------  Chief Complaint:    24 hour events/subjective:    seen earlier   c/o rash over body and pain in left leg     PAST HISTORY  --------------------------------------------------------------------------------  No significant changes to PMH, PSH, FHx, SHx, unless otherwise noted    ALLERGIES & MEDICATIONS  --------------------------------------------------------------------------------  Allergies    Zosyn (Pruritus)    Intolerances      Standing Inpatient Medications  ammonium lactate 12% Lotion 1 Application(s) Topical two times a day  aspirin  chewable 81 milliGRAM(s) Oral daily  atorvastatin 80 milliGRAM(s) Oral at bedtime  chlorhexidine 4% Liquid 1 Application(s) Topical <User Schedule>  clopidogrel Tablet 75 milliGRAM(s) Oral daily  heparin   Injectable 5000 Unit(s) SubCutaneous every 8 hours  insulin glargine Injectable (LANTUS) 35 Unit(s) SubCutaneous at bedtime  insulin lispro (ADMELOG) corrective regimen sliding scale   SubCutaneous Before meals and at bedtime  meropenem  IVPB      meropenem  IVPB 1000 milliGRAM(s) IV Intermittent every 12 hours  nicotine -   7 mG/24Hr(s) Patch 1 Patch Transdermal daily  pantoprazole    Tablet 40 milliGRAM(s) Oral before breakfast  tamsulosin 0.4 milliGRAM(s) Oral at bedtime    PRN Inpatient Medications  acetaminophen     Tablet .. 650 milliGRAM(s) Oral every 6 hours PRN  acetaminophen     Tablet .. 650 milliGRAM(s) Oral every 6 hours PRN  acetaminophen  Suppository .. 650 milliGRAM(s) Rectal once PRN  albuterol    90 MICROgram(s) HFA Inhaler 2 Puff(s) Inhalation three times a day PRN  dextrose 50% Injectable 25 Gram(s) IV Push every 15 minutes PRN  diphenhydrAMINE 50 milliGRAM(s) Oral every 4 hours PRN  hydrocortisone 1% Cream 1 Application(s) Topical every 6 hours PRN  HYDROmorphone  Injectable 0.5 milliGRAM(s) IV Push every 6 hours PRN      REVIEW OF SYSTEMS  --------------------------------------------------------------------------------    Gen: denies  fevers/chills,  CVS: denies chest pain/palpitations  Resp: denies SOB/Cough  GI: Denies N/V/Abd pain  : Denies dysuria    VITALS/PHYSICAL EXAM  --------------------------------------------------------------------------------  T(C): 36.9 (03-06-23 @ 20:00), Max: 37.1 (03-06-23 @ 00:00)  HR: 103 (03-06-23 @ 20:00) (100 - 111)  BP: 93/54 (03-06-23 @ 20:00) (93/54 - 134/74)  RR: 19 (03-06-23 @ 20:00) (13 - 20)  SpO2: 95% (03-06-23 @ 20:00) (92% - 96%)  Wt(kg): --        03-05-23 @ 07:01  -  03-06-23 @ 07:00  --------------------------------------------------------  IN: 1200 mL / OUT: 7700 mL / NET: -6500 mL    03-06-23 @ 07:01  -  03-06-23 @ 21:11  --------------------------------------------------------  IN: 970 mL / OUT: 1600 mL / NET: -630 mL      Physical Exam:  	  Gen:  on O2   	Pulm: decrease bs  no rales or ronchi or wheezing  	CV: no JVD. RRR, S1S2; no rub  	Abd: +BS, soft, nontender/nondistended, obese  	UE: Warm, no cyanosis  no clubbing,  no edema;   	LE: Warm, no cyanosis  no clubbing, no edema, RLE erhythema + skin ulcer present on admission as well   	Neuro: alert and oriented.  	Skin:  + rash macular back thoracic and left leg area. Left leg is significantly more erythematous and with left leg edema       LABS/STUDIES  --------------------------------------------------------------------------------              13.4   18.35 >-----------<  126      [03-06-23 @ 00:38]              43.2     127  |  86  |  113  ----------------------------<  144      [03-06-23 @ 17:26]  4.2   |  20  |  3.27        Ca     9.7     [03-06-23 @ 17:26]      Mg     2.4     [03-06-23 @ 17:26]      Phos  4.6     [03-06-23 @ 17:26]    TPro  8.0  /  Alb  3.5  /  TBili  0.8  /  DBili  x   /  AST  36  /  ALT  54  /  AlkPhos  313  [03-06-23 @ 17:26]    PT/INR: PT 15.8 , INR 1.36       [03-06-23 @ 00:38]  PTT: 32.3       [03-06-23 @ 00:38]          [03-06-23 @ 17:26]    Creatinine Trend:  SCr 3.27 [03-06 @ 17:26]  SCr 3.35 [03-06 @ 07:38]  SCr 3.53 [03-06 @ 00:38]  SCr 3.55 [03-05 @ 17:27]  SCr 3.67 [03-05 @ 10:05]              Urinalysis - [03-01-23 @ 18:53]      Color Yellow / Appearance Clear / SG 1.022 / pH 6.0      Gluc 500 mg/dL / Ketone Negative  / Bili Small / Urobili 3 mg/dL       Blood Negative / Protein 100 mg/dl / Leuk Est Negative / Nitrite Negative      RBC 2 / WBC 1 / Hyaline 0 / Gran  / Sq Epi  / Non Sq Epi 0 / Bacteria Negative      Iron 14, TIBC --, %sat --      [03-02-23 @ 01:20]  Ferritin 225      [03-02-23 @ 01:18]  PTH -- (Ca 8.9)      [10-02-22 @ 07:04]   73  HbA1c 8.9      [12-16-19 @ 08:15]  TSH 3.02      [09-30-22 @ 13:45]

## 2023-03-06 NOTE — PROGRESS NOTE ADULT - ASSESSMENT
Patient is a 62 year old male with PMHx of CHFrEF more recently 29%, has AICD, cardiomyopathy due to ischemia, Diabetes, COPD who was referred to hospital due to concerns for septic shock.    1) Sepsis with unclear source  - New worsening cellulitis on the left. Septic shock improved no longer on pressors.  - abx excalated to meropenem, now on vancomycin.  - First dose of ceftriaxone Friday March 3rd. Next day with rash, new left lower extremity pain and erythema, worsened renal fx.  - Would appreciate ID followup     2) acute on chronic kidney injury  - Creatinine improving  Off bumex gtt, urinating well, continue monitor I&Os  continue diuresis- bumex  - avoid nephrotoxic medications, medications and abx needs to be renally dosed    3) CHFrEF  Off pressors  Repeat echo with ef 25  - off all gdmt given septic shock on admission  - gradually resume GDMT when stablized and when certain that infection is improving. Hold for now    4) DMII  - getting better controlled  - On basal insulin and sliding scale.  - Titrate as per blood glucose levels    5) COPD  - Controlled with ERVIN prn  - Few symptoms, minimal exacerbations, and no recent hospitalizations due to exacerbation  - nicotine patches and gum as needed  - No wheezing on exam, however now hypoxia- probably due to chf    6) Right Hydronephrosis  - no evidence of stone, more consistent with chronic outlet obstruction  - Routine bladder scan avoid urinary retention.  - uro follow up outpatient- add finasteride to BPH regimen?      7) Anxiety  - controlled with low dose Xanax at home 0.25mg    8) hyponatremia  - Diuretics held  - appreciate nephro f.u    9)thrombocytopenia  - stable, continue to monitor

## 2023-03-06 NOTE — PROGRESS NOTE ADULT - ATTENDING COMMENTS
Agree with above  Shock state on pressors ?cellulitis  MANJIT, now better, diuresed aggressively 5.5L net negative/d, can D/C diuretics and monitor urine output off diuretics. Creatinine now 3  Now with what appears to be drug rash ?vancomycin  CT shows circumferential edema, but no acute evidence of gas-formation  c/w meropenem  Currently on 2LNC, spO2 adequate  Hemodynamically stable, protecting airway  Transfer to general medicine

## 2023-03-06 NOTE — CHART NOTE - [id number]_GEN_A_CORE
MICU Transfer Note    Transfer from: MICU  Transfer to:  ( x ) Medicine    (  ) Telemetry    (  ) RCU    (  ) Palliative    (  ) Stroke Unit    (  ) _______________  Accepting physician:      MICU COURSE:  62 year old male with pmh of HFrEF with an EF of 25% status post AICD, CAD status post stents, DM 2, HTN, COPD with a 30+ pack year history of smoking presents to the ED with shortness of breath, vomiting, diarrhea, hypoxia to mid 80s without NC --> 99% on 1L NC, hypotension requiring pressors in setting of fever admitted to MICU for septic shock. Patient was continued on cefepime and vancomycin. Was weaned off levophed 3/3. CT CAP without PNA, small pleural effusion, no infectious focus in abdomen. CT with hydronephrosis, worsened from previous CT 2022 but no obstructing stone. U/A and blood cultures negative. Cellulitis LLE newly found on 3/4 and patient was de-escalated to ceftriaxone. ID following. CT LE with diffuse subcutaneous edema and cellulitis, CK level elevated > 300. Pt developed a new blanching diffuse rash and worsening cellulitis 3/5 possible 2/2 to CTX, switched to vanc/hetal.   MICU stay complicated by worsening renal function (Cr 4.8) and decreasing urine output likely i/s/o cardiorenal. POCUS with volume overload, patient was started on bumex gtt with metolazone with improving urine output. Bumex gtt discontinued 3/6.     Follow up:  [ ] repeat CK level, consider vascular consult  [ ] ID recs, antibiotic course   [ ] nephrology recs   [ ] trend Cr and urine output   [ ] possible urology consult for hydronephrosis  [ ] restart GDMT when able         ASSESSMENT & PLAN:     Neuro  - A&O x4  - Not on any sedation    ------------------------------------------------------    Cardiovascular  # septic vs hypovolemic shock in setting of HFrEF of 19% s/p AICD  - 3 episode of vomiting, 1 episode of watery diarrhea in setting of poor PO intake, concerning for possible hypovolemic shock.   - On levo gtt, wean as tolerated.   - EKG shows sinus tachycardia  - POCUS shows severe LV systolic dysfunction with plethoric IVC, scattered B lines throughout  - BNP elevated 6243. Trop elevated at 101 likely in setting of demand from septic shock.   - TTE with EF 25% and severe diastolic dysfunction. No valvular pathologies   - No LE pitting edema, per POCUS today does have dilated IVC   - d/c bumex    #HTN  - At home takes, Bumex 2mg tid, Entresto 24/26 BID, Metoprolol  qD, Spironolactone 25mg qD.  - Holding anti-hypertensives for soft BPs, goal systolic > 90   - off levo support   - monitor BP, soft       #CAD   -  HFrEF of 19% s/p AICD, GDMT  - Cont Plavix and aspirin.     -----------------------------------------------------------    Respiratory  # hypoxia in setting of poor perfusion  - on Nasal Cannula 1L, saturating above 98%, wean as tolerated.   - Not acidotic or alkalotic on VBG. Will monitor  - CT angio chest showing no evidence of PE although limited by motion. Small right pleural effusion.   - satting in the 90s on NC   - c/w ICS     ------------------------------------------------------------------    GI  # Nausea, vomiting, diarrhea  - Zofran as needed  - Will monitor symptoms and electrolytes. Replete as needed    # Severe R hydronephrosis seen on CT chest  - Hx of R hydronephrosis in 2020, currently presentation likely chronic.   - CT abd/pelv w/o contrast with hydro, no sign of abscess/infection     #Transaminitis   Elevated alk phos, LFTs   Likely i/s/o congestive hepatopathy  RUQ without signs of cholecystitis/dilated CBD   - CTM     -----------------------------------------------------------------------    Renal  #MANJIT    #ATN vs worsening cardiorenal vs delayed contrast induced?   #Anion gap metabolic acidosis   Creatinine uptrending 4.8 (baseline 1.5 to 1.8)   Not retaining on bladder scan   Given albumin x2 (repeat POCUS with reduced IVC) and bumex gtt --> increased urine output  - d/c bumex  - monitor urine output   - nephrology following, no urgent HD at this time   - Will monitor and trend. Avoid nephrotoxins and renally dose meds.     --------------------------------------------------------------------------    Endo  #T2DM  -  40 units of Lantus p.m. at home  - c/w 35U lantus + ISS     ----------------------------------------------------------------------    Heme  - No active issues  - DVT PPX: Lovenox 40 qD.     ---------------------------------------------------------------------    ID  #shock 2/2 likely sepsis with unclear etiology.  #cellulitis LLE   - Febrile. WBC elevated to 19k now downtrending. Likely 2/2 to new cellulitis LLE   - CT chest with R pleural effusion, with no signs of pneumonia.   - s/p Cefepime and Vanc in the ED.  - Staph positive, MRSA neg   - BC 3/1 NGTD   - Urinalysis negative, chronic R hydronephrosis. CT abd/pelv without infectious source  - ID consulted, appreciate recs  - likely allergic rx to ceftriaxone + worsening cellulitis --> broadened to meropenem and vanc  - CT leg with circumferential edema and b/l cellulitis?  - CK elevated at 300, repeat CK pending. Consider vascular consult      ------------------------------------------------------------------------  Ethics  - Full code    Diet   - Regular, consistent carb diet.               Vital Signs Last 24 Hrs  T(C): 36.9 (06 Mar 2023 10:00), Max: 37.6 (05 Mar 2023 16:00)  T(F): 98.4 (06 Mar 2023 10:00), Max: 99.7 (05 Mar 2023 16:00)  HR: 106 (06 Mar 2023 10:00) (100 - 111)  BP: 107/69 (06 Mar 2023 10:00) (100/69 - 134/74)  BP(mean): 83 (06 Mar 2023 10:00) (75 - 98)  RR: 20 (06 Mar 2023 10:00) (13 - 28)  SpO2: 95% (06 Mar 2023 10:00) (92% - 96%)    Parameters below as of 06 Mar 2023 08:00  Patient On (Oxygen Delivery Method): nasal cannula  O2 Flow (L/min): 2    I&O's Summary    05 Mar 2023 07:01  -  06 Mar 2023 07:00  --------------------------------------------------------  IN: 1200 mL / OUT: 7700 mL / NET: -6500 mL    06 Mar 2023 07:01  -  06 Mar 2023 11:36  --------------------------------------------------------  IN: 250 mL / OUT: 300 mL / NET: -50 mL          MEDICATIONS  (STANDING):  ammonium lactate 12% Lotion 1 Application(s) Topical two times a day  aspirin  chewable 81 milliGRAM(s) Oral daily  atorvastatin 80 milliGRAM(s) Oral at bedtime  chlorhexidine 4% Liquid 1 Application(s) Topical <User Schedule>  clopidogrel Tablet 75 milliGRAM(s) Oral daily  heparin   Injectable 5000 Unit(s) SubCutaneous every 8 hours  insulin glargine Injectable (LANTUS) 35 Unit(s) SubCutaneous at bedtime  insulin lispro (ADMELOG) corrective regimen sliding scale   SubCutaneous Before meals and at bedtime  meropenem  IVPB      meropenem  IVPB 1000 milliGRAM(s) IV Intermittent every 12 hours  nicotine -   7 mG/24Hr(s) Patch 1 Patch Transdermal daily  pantoprazole    Tablet 40 milliGRAM(s) Oral before breakfast  potassium chloride   Solution 40 milliEquivalent(s) Oral every 4 hours  tamsulosin 0.4 milliGRAM(s) Oral at bedtime    MEDICATIONS  (PRN):  acetaminophen     Tablet .. 650 milliGRAM(s) Oral every 6 hours PRN Temp greater or equal to 38C (100.4F), Mild Pain (1 - 3)  acetaminophen     Tablet .. 650 milliGRAM(s) Oral every 6 hours PRN Mild Pain (1 - 3), Moderate Pain (4 - 6)  acetaminophen  Suppository .. 650 milliGRAM(s) Rectal once PRN Temp greater or equal to 38C (100.4F)  albuterol    90 MICROgram(s) HFA Inhaler 2 Puff(s) Inhalation three times a day PRN Shortness of Breath and/or Wheezing  dextrose 50% Injectable 25 Gram(s) IV Push every 15 minutes PRN blood glucose <70  diphenhydrAMINE 50 milliGRAM(s) Oral every 4 hours PRN Rash and/or Itching  hydrocortisone 2.5% Ointment 1 Application(s) Topical every 6 hours PRN Itching  HYDROmorphone  Injectable 0.5 milliGRAM(s) IV Push every 6 hours PRN Severe Pain (7 - 10)        LABS                                            13.4                  Neurophils% (auto):   86.9   (03-06 @ 00:38):    18.35)-----------(126          Lymphocytes% (auto):  3.1                                           43.2                   Eosinphils% (auto):   2.3      Manual%: Neutrophils x    ; Lymphocytes x    ; Eosinophils x    ; Bands%: x    ; Blasts x                                    126    |  84     |  112                 Calcium: 9.5   / iCa: x      (03-06 @ 07:38)    ----------------------------<  178       Magnesium: x                                3.2     |  21     |  3.35             Phosphorous: x        TPro  8.2    /  Alb  3.6    /  TBili  0.8    /  DBili  x      /  AST  35     /  ALT  59     /  AlkPhos  323    06 Mar 2023 07:38    ( 03-06 @ 00:38 )   PT: 15.8 sec;   INR: 1.36 ratio  aPTT: 32.3 sec MICU Transfer Note    Transfer from: MICU  Transfer to:  ( x ) Medicine    (  ) Telemetry    (  ) RCU    (  ) Palliative    (  ) Stroke Unit    (  ) _______________  Accepting physician:      MICU COURSE:  62 year old male with pmh of HFrEF with an EF of 25% status post AICD, CAD status post stents, DM 2, HTN, COPD with a 30+ pack year history of smoking presents to the ED with shortness of breath, vomiting, diarrhea, hypoxia to mid 80s without NC --> 99% on 1L NC, hypotension requiring pressors in setting of fever admitted to MICU for septic shock. Patient was continued on cefepime and vancomycin. Was weaned off levophed 3/3. CT CAP without PNA, small pleural effusion, no infectious focus in abdomen. CT with hydronephrosis, worsened from previous CT 2022 but no obstructing stone. U/A and blood cultures negative. Cellulitis LLE newly found on 3/4 and patient was de-escalated to ceftriaxone. ID following. CT LE with diffuse subcutaneous edema and cellulitis, CK level elevated > 300. Pt developed a new blanching diffuse rash and worsening cellulitis 3/5 possible 2/2 to CTX, switched to vanc/hetal.   MICU stay complicated by worsening renal function (Cr 4.8) and decreasing urine output likely i/s/o cardiorenal. POCUS with volume overload, patient was started on bumex gtt with metolazone with improving urine output. Bumex gtt discontinued 3/6.     Follow up:  [ ] repeat CK level, consider vascular consult  [ ] ID recs, antibiotic course   [ ] nephrology recs   [ ] trend Cr and urine output   [ ] possible urology consult for hydronephrosis  [ ] restart GDMT when able   [ ] wean O2, c/w ICS         ASSESSMENT & PLAN:     Neuro  - A&O x4  - Not on any sedation    ------------------------------------------------------    Cardiovascular  # septic vs hypovolemic shock in setting of HFrEF of 19% s/p AICD  - 3 episode of vomiting, 1 episode of watery diarrhea in setting of poor PO intake, concerning for possible hypovolemic shock.   - On levo gtt, wean as tolerated.   - EKG shows sinus tachycardia  - POCUS shows severe LV systolic dysfunction with plethoric IVC, scattered B lines throughout  - BNP elevated 6243. Trop elevated at 101 likely in setting of demand from septic shock.   - TTE with EF 25% and severe diastolic dysfunction. No valvular pathologies   - No LE pitting edema, per POCUS today does have dilated IVC   - d/c bumex    #HTN  - At home takes, Bumex 2mg tid, Entresto 24/26 BID, Metoprolol  qD, Spironolactone 25mg qD.  - Holding anti-hypertensives for soft BPs, goal systolic > 90   - off levo support   - monitor BP, soft       #CAD   -  HFrEF of 19% s/p AICD, GDMT  - Cont Plavix and aspirin.     -----------------------------------------------------------    Respiratory  # hypoxia in setting of poor perfusion  - on Nasal Cannula 1L, saturating above 98%, wean as tolerated.   - Not acidotic or alkalotic on VBG. Will monitor  - CT angio chest showing no evidence of PE although limited by motion. Small right pleural effusion.   - satting in the 90s on NC   - c/w ICS     ------------------------------------------------------------------    GI  # Nausea, vomiting, diarrhea  - Zofran as needed  - Will monitor symptoms and electrolytes. Replete as needed    # Severe R hydronephrosis seen on CT chest  - Hx of R hydronephrosis in 2020, currently presentation likely chronic.   - CT abd/pelv w/o contrast with hydro, no sign of abscess/infection     #Transaminitis   Elevated alk phos, LFTs   Likely i/s/o congestive hepatopathy  RUQ without signs of cholecystitis/dilated CBD   - CTM     -----------------------------------------------------------------------    Renal  #MANJIT    #ATN vs worsening cardiorenal vs delayed contrast induced?   #Anion gap metabolic acidosis   Creatinine uptrending 4.8 (baseline 1.5 to 1.8)   Not retaining on bladder scan   Given albumin x2 (repeat POCUS with reduced IVC) and bumex gtt --> increased urine output  - d/c bumex  - monitor urine output   - nephrology following, no urgent HD at this time   - Will monitor and trend. Avoid nephrotoxins and renally dose meds.     --------------------------------------------------------------------------    Endo  #T2DM  -  40 units of Lantus p.m. at home  - c/w 35U lantus + ISS     ----------------------------------------------------------------------    Heme  - No active issues  - DVT PPX: Lovenox 40 qD.     ---------------------------------------------------------------------    ID  #shock 2/2 likely sepsis with unclear etiology.  #cellulitis LLE   - Febrile. WBC elevated to 19k now downtrending. Likely 2/2 to new cellulitis LLE   - CT chest with R pleural effusion, with no signs of pneumonia.   - s/p Cefepime and Vanc in the ED.  - Staph positive, MRSA neg   - BC 3/1 NGTD   - Urinalysis negative, chronic R hydronephrosis. CT abd/pelv without infectious source  - ID consulted, appreciate recs  - likely allergic rx to ceftriaxone + worsening cellulitis --> broadened to meropenem and vanc  - CT leg with circumferential edema and b/l cellulitis?  - CK elevated at 300, repeat CK pending. Consider vascular consult      ------------------------------------------------------------------------  Ethics  - Full code    Diet   - Regular, consistent carb diet.               Vital Signs Last 24 Hrs  T(C): 36.9 (06 Mar 2023 10:00), Max: 37.6 (05 Mar 2023 16:00)  T(F): 98.4 (06 Mar 2023 10:00), Max: 99.7 (05 Mar 2023 16:00)  HR: 106 (06 Mar 2023 10:00) (100 - 111)  BP: 107/69 (06 Mar 2023 10:00) (100/69 - 134/74)  BP(mean): 83 (06 Mar 2023 10:00) (75 - 98)  RR: 20 (06 Mar 2023 10:00) (13 - 28)  SpO2: 95% (06 Mar 2023 10:00) (92% - 96%)    Parameters below as of 06 Mar 2023 08:00  Patient On (Oxygen Delivery Method): nasal cannula  O2 Flow (L/min): 2    I&O's Summary    05 Mar 2023 07:01  -  06 Mar 2023 07:00  --------------------------------------------------------  IN: 1200 mL / OUT: 7700 mL / NET: -6500 mL    06 Mar 2023 07:01  -  06 Mar 2023 11:36  --------------------------------------------------------  IN: 250 mL / OUT: 300 mL / NET: -50 mL          MEDICATIONS  (STANDING):  ammonium lactate 12% Lotion 1 Application(s) Topical two times a day  aspirin  chewable 81 milliGRAM(s) Oral daily  atorvastatin 80 milliGRAM(s) Oral at bedtime  chlorhexidine 4% Liquid 1 Application(s) Topical <User Schedule>  clopidogrel Tablet 75 milliGRAM(s) Oral daily  heparin   Injectable 5000 Unit(s) SubCutaneous every 8 hours  insulin glargine Injectable (LANTUS) 35 Unit(s) SubCutaneous at bedtime  insulin lispro (ADMELOG) corrective regimen sliding scale   SubCutaneous Before meals and at bedtime  meropenem  IVPB      meropenem  IVPB 1000 milliGRAM(s) IV Intermittent every 12 hours  nicotine -   7 mG/24Hr(s) Patch 1 Patch Transdermal daily  pantoprazole    Tablet 40 milliGRAM(s) Oral before breakfast  potassium chloride   Solution 40 milliEquivalent(s) Oral every 4 hours  tamsulosin 0.4 milliGRAM(s) Oral at bedtime    MEDICATIONS  (PRN):  acetaminophen     Tablet .. 650 milliGRAM(s) Oral every 6 hours PRN Temp greater or equal to 38C (100.4F), Mild Pain (1 - 3)  acetaminophen     Tablet .. 650 milliGRAM(s) Oral every 6 hours PRN Mild Pain (1 - 3), Moderate Pain (4 - 6)  acetaminophen  Suppository .. 650 milliGRAM(s) Rectal once PRN Temp greater or equal to 38C (100.4F)  albuterol    90 MICROgram(s) HFA Inhaler 2 Puff(s) Inhalation three times a day PRN Shortness of Breath and/or Wheezing  dextrose 50% Injectable 25 Gram(s) IV Push every 15 minutes PRN blood glucose <70  diphenhydrAMINE 50 milliGRAM(s) Oral every 4 hours PRN Rash and/or Itching  hydrocortisone 2.5% Ointment 1 Application(s) Topical every 6 hours PRN Itching  HYDROmorphone  Injectable 0.5 milliGRAM(s) IV Push every 6 hours PRN Severe Pain (7 - 10)        LABS                                            13.4                  Neurophils% (auto):   86.9   (03-06 @ 00:38):    18.35)-----------(126          Lymphocytes% (auto):  3.1                                           43.2                   Eosinphils% (auto):   2.3      Manual%: Neutrophils x    ; Lymphocytes x    ; Eosinophils x    ; Bands%: x    ; Blasts x                                    126    |  84     |  112                 Calcium: 9.5   / iCa: x      (03-06 @ 07:38)    ----------------------------<  178       Magnesium: x                                3.2     |  21     |  3.35             Phosphorous: x        TPro  8.2    /  Alb  3.6    /  TBili  0.8    /  DBili  x      /  AST  35     /  ALT  59     /  AlkPhos  323    06 Mar 2023 07:38    ( 03-06 @ 00:38 )   PT: 15.8 sec;   INR: 1.36 ratio  aPTT: 32.3 sec MICU Transfer Note    Transfer from: MICU  Transfer to:  ( x ) Medicine    (  ) Telemetry    (  ) RCU    (  ) Palliative    (  ) Stroke Unit    (  ) _______________  Accepting physician: Dr. Lul Cruz      MICU COURSE:  62 year old male with pmh of HFrEF with an EF of 25% status post AICD, CAD status post stents, DM 2, HTN, COPD with a 30+ pack year history of smoking presents to the ED with shortness of breath, vomiting, diarrhea, hypoxia to mid 80s without NC --> 99% on 1L NC, hypotension requiring pressors in setting of fever admitted to MICU for septic shock. Patient was continued on cefepime and vancomycin. Was weaned off levophed 3/3. CT CAP without PNA, small pleural effusion, no infectious focus in abdomen. CT with hydronephrosis, worsened from previous CT 2022 but no obstructing stone. U/A and blood cultures negative. Cellulitis LLE newly found on 3/4 and patient was de-escalated to ceftriaxone. ID following. CT LE with diffuse subcutaneous edema and cellulitis, CK level elevated > 300. Pt developed a new blanching diffuse rash and worsening cellulitis 3/5 possible 2/2 to CTX, switched to vanc/hetal.   MICU stay complicated by worsening renal function (Cr 4.8) and decreasing urine output likely i/s/o cardiorenal. POCUS with volume overload, patient was started on bumex gtt with metolazone with improving urine output. Bumex gtt discontinued 3/6.     Follow up:  [ ] repeat CK level, consider vascular consult  [ ] ID recs, antibiotic course   [ ] nephrology recs   [ ] trend Cr and urine output   [ ] possible urology consult for hydronephrosis  [ ] restart GDMT when able   [ ] wean O2, c/w ICS         ASSESSMENT & PLAN:     Neuro  - A&O x4  - Not on any sedation    ------------------------------------------------------    Cardiovascular  # septic vs hypovolemic shock in setting of HFrEF of 19% s/p AICD  - 3 episode of vomiting, 1 episode of watery diarrhea in setting of poor PO intake, concerning for possible hypovolemic shock.   - On levo gtt, wean as tolerated.   - EKG shows sinus tachycardia  - POCUS shows severe LV systolic dysfunction with plethoric IVC, scattered B lines throughout  - BNP elevated 6243. Trop elevated at 101 likely in setting of demand from septic shock.   - TTE with EF 25% and severe diastolic dysfunction. No valvular pathologies   - No LE pitting edema, per POCUS today does have dilated IVC   - d/c bumex    #HTN  - At home takes, Bumex 2mg tid, Entresto 24/26 BID, Metoprolol  qD, Spironolactone 25mg qD.  - Holding anti-hypertensives for soft BPs, goal systolic > 90   - off levo support   - monitor BP, soft       #CAD   -  HFrEF of 19% s/p AICD, GDMT  - Cont Plavix and aspirin.     -----------------------------------------------------------    Respiratory  # hypoxia in setting of poor perfusion  - on Nasal Cannula 1L, saturating above 98%, wean as tolerated.   - Not acidotic or alkalotic on VBG. Will monitor  - CT angio chest showing no evidence of PE although limited by motion. Small right pleural effusion.   - satting in the 90s on NC   - c/w ICS     ------------------------------------------------------------------    GI  # Nausea, vomiting, diarrhea  - Zofran as needed  - Will monitor symptoms and electrolytes. Replete as needed    # Severe R hydronephrosis seen on CT chest  - Hx of R hydronephrosis in 2020, currently presentation likely chronic.   - CT abd/pelv w/o contrast with hydro, no sign of abscess/infection     #Transaminitis   Elevated alk phos, LFTs   Likely i/s/o congestive hepatopathy  RUQ without signs of cholecystitis/dilated CBD   - CTM     -----------------------------------------------------------------------    Renal  #MANJIT    #ATN vs worsening cardiorenal vs delayed contrast induced?   #Anion gap metabolic acidosis   Creatinine uptrending 4.8 (baseline 1.5 to 1.8)   Not retaining on bladder scan   Given albumin x2 (repeat POCUS with reduced IVC) and bumex gtt --> increased urine output  - d/c bumex  - monitor urine output   - nephrology following, no urgent HD at this time   - Will monitor and trend. Avoid nephrotoxins and renally dose meds.     --------------------------------------------------------------------------    Endo  #T2DM  -  40 units of Lantus p.m. at home  - c/w 35U lantus + ISS     ----------------------------------------------------------------------    Heme  - No active issues  - DVT PPX: Lovenox 40 qD.     ---------------------------------------------------------------------    ID  #shock 2/2 likely sepsis with unclear etiology.  #cellulitis LLE   - Febrile. WBC elevated to 19k now downtrending. Likely 2/2 to new cellulitis LLE   - CT chest with R pleural effusion, with no signs of pneumonia.   - s/p Cefepime and Vanc in the ED.  - Staph positive, MRSA neg   - BC 3/1 NGTD   - Urinalysis negative, chronic R hydronephrosis. CT abd/pelv without infectious source  - ID consulted, appreciate recs  - likely allergic rx to ceftriaxone + worsening cellulitis --> broadened to meropenem and vanc  - CT leg with circumferential edema and b/l cellulitis?  - CK elevated at 300, repeat CK pending. Consider vascular consult      ------------------------------------------------------------------------  Ethics  - Full code    Diet   - Regular, consistent carb diet.               Vital Signs Last 24 Hrs  T(C): 36.9 (06 Mar 2023 10:00), Max: 37.6 (05 Mar 2023 16:00)  T(F): 98.4 (06 Mar 2023 10:00), Max: 99.7 (05 Mar 2023 16:00)  HR: 106 (06 Mar 2023 10:00) (100 - 111)  BP: 107/69 (06 Mar 2023 10:00) (100/69 - 134/74)  BP(mean): 83 (06 Mar 2023 10:00) (75 - 98)  RR: 20 (06 Mar 2023 10:00) (13 - 28)  SpO2: 95% (06 Mar 2023 10:00) (92% - 96%)    Parameters below as of 06 Mar 2023 08:00  Patient On (Oxygen Delivery Method): nasal cannula  O2 Flow (L/min): 2    I&O's Summary    05 Mar 2023 07:01  -  06 Mar 2023 07:00  --------------------------------------------------------  IN: 1200 mL / OUT: 7700 mL / NET: -6500 mL    06 Mar 2023 07:01  -  06 Mar 2023 11:36  --------------------------------------------------------  IN: 250 mL / OUT: 300 mL / NET: -50 mL          MEDICATIONS  (STANDING):  ammonium lactate 12% Lotion 1 Application(s) Topical two times a day  aspirin  chewable 81 milliGRAM(s) Oral daily  atorvastatin 80 milliGRAM(s) Oral at bedtime  chlorhexidine 4% Liquid 1 Application(s) Topical <User Schedule>  clopidogrel Tablet 75 milliGRAM(s) Oral daily  heparin   Injectable 5000 Unit(s) SubCutaneous every 8 hours  insulin glargine Injectable (LANTUS) 35 Unit(s) SubCutaneous at bedtime  insulin lispro (ADMELOG) corrective regimen sliding scale   SubCutaneous Before meals and at bedtime  meropenem  IVPB      meropenem  IVPB 1000 milliGRAM(s) IV Intermittent every 12 hours  nicotine -   7 mG/24Hr(s) Patch 1 Patch Transdermal daily  pantoprazole    Tablet 40 milliGRAM(s) Oral before breakfast  potassium chloride   Solution 40 milliEquivalent(s) Oral every 4 hours  tamsulosin 0.4 milliGRAM(s) Oral at bedtime    MEDICATIONS  (PRN):  acetaminophen     Tablet .. 650 milliGRAM(s) Oral every 6 hours PRN Temp greater or equal to 38C (100.4F), Mild Pain (1 - 3)  acetaminophen     Tablet .. 650 milliGRAM(s) Oral every 6 hours PRN Mild Pain (1 - 3), Moderate Pain (4 - 6)  acetaminophen  Suppository .. 650 milliGRAM(s) Rectal once PRN Temp greater or equal to 38C (100.4F)  albuterol    90 MICROgram(s) HFA Inhaler 2 Puff(s) Inhalation three times a day PRN Shortness of Breath and/or Wheezing  dextrose 50% Injectable 25 Gram(s) IV Push every 15 minutes PRN blood glucose <70  diphenhydrAMINE 50 milliGRAM(s) Oral every 4 hours PRN Rash and/or Itching  hydrocortisone 2.5% Ointment 1 Application(s) Topical every 6 hours PRN Itching  HYDROmorphone  Injectable 0.5 milliGRAM(s) IV Push every 6 hours PRN Severe Pain (7 - 10)        LABS                                            13.4                  Neurophils% (auto):   86.9   (03-06 @ 00:38):    18.35)-----------(126          Lymphocytes% (auto):  3.1                                           43.2                   Eosinphils% (auto):   2.3      Manual%: Neutrophils x    ; Lymphocytes x    ; Eosinophils x    ; Bands%: x    ; Blasts x                                    126    |  84     |  112                 Calcium: 9.5   / iCa: x      (03-06 @ 07:38)    ----------------------------<  178       Magnesium: x                                3.2     |  21     |  3.35             Phosphorous: x        TPro  8.2    /  Alb  3.6    /  TBili  0.8    /  DBili  x      /  AST  35     /  ALT  59     /  AlkPhos  323    06 Mar 2023 07:38    ( 03-06 @ 00:38 )   PT: 15.8 sec;   INR: 1.36 ratio  aPTT: 32.3 sec

## 2023-03-06 NOTE — PROGRESS NOTE ADULT - ASSESSMENT
62 year old male with pmh of HFrEF with an EF of 19% status post AICD, CAD status post stents, DM2, HTN, COPD with a 30+ pack year history of smoking presents to the ED with shortness of breath.       1- MANJIT on CKD i,e ATN   2- sepsis  3- CHF  4- DM2  5- chronic R hydro  6- Left leg cellulitis  7- rash     MANJIT on ckd III in setting of sepsis/fevers/hypotension   has hx of cardiomyopathy with ef < 20 %   he has done well with bumex drip and uo 7900 cc in 24 hr    dc bumex drip   suspect possible cephalosporin rash on thorax   left leg erythema is worsening and is concerning for worsening infection   on meropenem now   cpk not elevated   cr is still high   diurese prn   cont O2   insulin s-s-   strict I/O  pan culture  NGTD   d/w ICU team when seen earlier.

## 2023-03-06 NOTE — PROGRESS NOTE ADULT - SUBJECTIVE AND OBJECTIVE BOX
INTERVAL HPI/OVERNIGHT EVENTS:    SUBJECTIVE: Patient seen and examined at bedside.     CONSTITUTIONAL: No weakness, fevers or chills  EYES/ENT: No visual changes;  No vertigo or throat pain   NECK: No pain or stiffness  RESPIRATORY: No cough, wheezing, hemoptysis; No shortness of breath  CARDIOVASCULAR: No chest pain or palpitations  GASTROINTESTINAL: No abdominal or epigastric pain. No nausea, vomiting, or hematemesis; No diarrhea or constipation. No melena or hematochezia.  GENITOURINARY: No dysuria, frequency or hematuria  NEUROLOGICAL: No numbness or weakness  SKIN: No itching, rashes    OBJECTIVE:    VITAL SIGNS:  ICU Vital Signs Last 24 Hrs  T(C): 37.1 (06 Mar 2023 04:00), Max: 37.6 (05 Mar 2023 16:00)  T(F): 98.8 (06 Mar 2023 04:00), Max: 99.7 (05 Mar 2023 16:00)  HR: 109 (06 Mar 2023 06:00) (97 - 111)  BP: 134/74 (06 Mar 2023 06:00) (102/60 - 134/74)  BP(mean): 92 (06 Mar 2023 06:00) (75 - 98)  ABP: --  ABP(mean): --  RR: 19 (06 Mar 2023 06:00) (13 - 28)  SpO2: 92% (06 Mar 2023 06:00) (91% - 96%)    O2 Parameters below as of 05 Mar 2023 20:00  Patient On (Oxygen Delivery Method): nasal cannula  O2 Flow (L/min): 2            03-05 @ 07:01  -  03-06 @ 07:00  --------------------------------------------------------  IN: 1200 mL / OUT: 7700 mL / NET: -6500 mL      CAPILLARY BLOOD GLUCOSE      POCT Blood Glucose.: 161 mg/dL (05 Mar 2023 21:19)      PHYSICAL EXAM:    General: NAD, Speaking on full sentences on Nasal cannula.   HEENT: NCAT.   Cardiac: RRR, warm extremities.   Chest: CTA, no crackles/wheezing bilaterally.   Abdomen: soft, non-distended, no ttp, no rebound or guarding. No CVA tenderness bilaterally.   Extremities: + bilateral peripheral pitting edema, No calf tenderness, No leg size discrepancies  Skin: new LLE erythema, warmth, ttp, RLE kaykay erythema and scab without drainage   MSK: No focal spinal tenderness   Neuro: AAOx3, motor and sensory grossly intact.   Psych: mood and affect appropriate      MEDICATIONS:  MEDICATIONS  (STANDING):  aspirin  chewable 81 milliGRAM(s) Oral daily  atorvastatin 80 milliGRAM(s) Oral at bedtime  chlorhexidine 4% Liquid 1 Application(s) Topical <User Schedule>  clopidogrel Tablet 75 milliGRAM(s) Oral daily  heparin   Injectable 5000 Unit(s) SubCutaneous every 8 hours  insulin glargine Injectable (LANTUS) 35 Unit(s) SubCutaneous at bedtime  insulin lispro (ADMELOG) corrective regimen sliding scale   SubCutaneous Before meals and at bedtime  meropenem  IVPB      meropenem  IVPB 1000 milliGRAM(s) IV Intermittent every 12 hours  nicotine -   7 mG/24Hr(s) Patch 1 Patch Transdermal daily  pantoprazole    Tablet 40 milliGRAM(s) Oral before breakfast  tamsulosin 0.4 milliGRAM(s) Oral at bedtime    MEDICATIONS  (PRN):  acetaminophen     Tablet .. 650 milliGRAM(s) Oral every 6 hours PRN Temp greater or equal to 38C (100.4F), Mild Pain (1 - 3)  acetaminophen     Tablet .. 650 milliGRAM(s) Oral every 6 hours PRN Mild Pain (1 - 3), Moderate Pain (4 - 6)  acetaminophen  Suppository .. 650 milliGRAM(s) Rectal once PRN Temp greater or equal to 38C (100.4F)  albuterol    90 MICROgram(s) HFA Inhaler 2 Puff(s) Inhalation three times a day PRN Shortness of Breath and/or Wheezing  dextrose 50% Injectable 25 Gram(s) IV Push every 15 minutes PRN blood glucose <70  diphenhydrAMINE 50 milliGRAM(s) Oral every 4 hours PRN Rash and/or Itching  hydrocortisone 2.5% Ointment 1 Application(s) Topical every 6 hours PRN Itching  HYDROmorphone  Injectable 0.5 milliGRAM(s) IV Push every 6 hours PRN Severe Pain (7 - 10)      ALLERGIES:  Allergies    Zosyn (Pruritus)    Intolerances        LABS:                        13.4   18.35 )-----------( 126      ( 06 Mar 2023 00:38 )             43.2     03-06    130<L>  |  88<L>  |  112<H>  ----------------------------<  169<H>  3.1<L>   |  20<L>  |  3.53<H>    Ca    9.2      06 Mar 2023 00:38  Phos  5.4     03-06  Mg     2.3     03-06    TPro  7.9  /  Alb  3.4  /  TBili  0.7  /  DBili  x   /  AST  37  /  ALT  60<H>  /  AlkPhos  297<H>  03-06    PT/INR - ( 06 Mar 2023 00:38 )   PT: 15.8 sec;   INR: 1.36 ratio         PTT - ( 06 Mar 2023 00:38 )  PTT:32.3 sec      RADIOLOGY & ADDITIONAL TESTS: Reviewed. INTERVAL HPI/OVERNIGHT EVENTS:    SUBJECTIVE: Patient seen and examined at bedside. Describes worsening rash diffusely, worse in the chest and back. Denies fevers/chills, CP, SOB, lightheadedness     CONSTITUTIONAL: No weakness, fevers or chills  EYES/ENT: No visual changes;  No vertigo or throat pain   NECK: No pain or stiffness  RESPIRATORY: No cough, wheezing, hemoptysis; No shortness of breath  CARDIOVASCULAR: No chest pain or palpitations  GASTROINTESTINAL: No abdominal or epigastric pain. No nausea, vomiting, or hematemesis; No diarrhea or constipation. No melena or hematochezia.  GENITOURINARY: No dysuria, frequency or hematuria  NEUROLOGICAL: No numbness or weakness  SKIN: +itching, rashes    OBJECTIVE:    VITAL SIGNS:  ICU Vital Signs Last 24 Hrs  T(C): 37.1 (06 Mar 2023 04:00), Max: 37.6 (05 Mar 2023 16:00)  T(F): 98.8 (06 Mar 2023 04:00), Max: 99.7 (05 Mar 2023 16:00)  HR: 109 (06 Mar 2023 06:00) (97 - 111)  BP: 134/74 (06 Mar 2023 06:00) (102/60 - 134/74)  BP(mean): 92 (06 Mar 2023 06:00) (75 - 98)  ABP: --  ABP(mean): --  RR: 19 (06 Mar 2023 06:00) (13 - 28)  SpO2: 92% (06 Mar 2023 06:00) (91% - 96%)    O2 Parameters below as of 05 Mar 2023 20:00  Patient On (Oxygen Delivery Method): nasal cannula  O2 Flow (L/min): 2            03-05 @ 07:01  -  03-06 @ 07:00  --------------------------------------------------------  IN: 1200 mL / OUT: 7700 mL / NET: -6500 mL      CAPILLARY BLOOD GLUCOSE      POCT Blood Glucose.: 161 mg/dL (05 Mar 2023 21:19)      PHYSICAL EXAM:    General: NAD, Speaking on full sentences on Nasal cannula.   HEENT: NCAT.   Cardiac: RRR, warm extremities.   Chest: CTA, no crackles/wheezing bilaterally.   Abdomen: soft, non-distended, no ttp, no rebound or guarding. No CVA tenderness bilaterally.   Extremities: + bilateral peripheral pitting edema, No calf tenderness, No leg size discrepancies  Skin: new LLE erythema, warmth, ttp, RLE kaykay erythema and scab without drainage. Blanching erythematous rash on back and chest   MSK: No focal spinal tenderness   Neuro: AAOx3, motor and sensory grossly intact.   Psych: mood and affect appropriate      MEDICATIONS:  MEDICATIONS  (STANDING):  aspirin  chewable 81 milliGRAM(s) Oral daily  atorvastatin 80 milliGRAM(s) Oral at bedtime  chlorhexidine 4% Liquid 1 Application(s) Topical <User Schedule>  clopidogrel Tablet 75 milliGRAM(s) Oral daily  heparin   Injectable 5000 Unit(s) SubCutaneous every 8 hours  insulin glargine Injectable (LANTUS) 35 Unit(s) SubCutaneous at bedtime  insulin lispro (ADMELOG) corrective regimen sliding scale   SubCutaneous Before meals and at bedtime  meropenem  IVPB      meropenem  IVPB 1000 milliGRAM(s) IV Intermittent every 12 hours  nicotine -   7 mG/24Hr(s) Patch 1 Patch Transdermal daily  pantoprazole    Tablet 40 milliGRAM(s) Oral before breakfast  tamsulosin 0.4 milliGRAM(s) Oral at bedtime    MEDICATIONS  (PRN):  acetaminophen     Tablet .. 650 milliGRAM(s) Oral every 6 hours PRN Temp greater or equal to 38C (100.4F), Mild Pain (1 - 3)  acetaminophen     Tablet .. 650 milliGRAM(s) Oral every 6 hours PRN Mild Pain (1 - 3), Moderate Pain (4 - 6)  acetaminophen  Suppository .. 650 milliGRAM(s) Rectal once PRN Temp greater or equal to 38C (100.4F)  albuterol    90 MICROgram(s) HFA Inhaler 2 Puff(s) Inhalation three times a day PRN Shortness of Breath and/or Wheezing  dextrose 50% Injectable 25 Gram(s) IV Push every 15 minutes PRN blood glucose <70  diphenhydrAMINE 50 milliGRAM(s) Oral every 4 hours PRN Rash and/or Itching  hydrocortisone 2.5% Ointment 1 Application(s) Topical every 6 hours PRN Itching  HYDROmorphone  Injectable 0.5 milliGRAM(s) IV Push every 6 hours PRN Severe Pain (7 - 10)      ALLERGIES:  Allergies    Zosyn (Pruritus)    Intolerances        LABS:                        13.4   18.35 )-----------( 126      ( 06 Mar 2023 00:38 )             43.2     03-06    130<L>  |  88<L>  |  112<H>  ----------------------------<  169<H>  3.1<L>   |  20<L>  |  3.53<H>    Ca    9.2      06 Mar 2023 00:38  Phos  5.4     03-06  Mg     2.3     03-06    TPro  7.9  /  Alb  3.4  /  TBili  0.7  /  DBili  x   /  AST  37  /  ALT  60<H>  /  AlkPhos  297<H>  03-06    PT/INR - ( 06 Mar 2023 00:38 )   PT: 15.8 sec;   INR: 1.36 ratio         PTT - ( 06 Mar 2023 00:38 )  PTT:32.3 sec      RADIOLOGY & ADDITIONAL TESTS: Reviewed.

## 2023-03-07 LAB
ALBUMIN SERPL ELPH-MCNC: 3.4 G/DL — SIGNIFICANT CHANGE UP (ref 3.3–5)
ALP SERPL-CCNC: 298 U/L — HIGH (ref 40–120)
ALT FLD-CCNC: 44 U/L — SIGNIFICANT CHANGE UP (ref 10–45)
ANION GAP SERPL CALC-SCNC: 21 MMOL/L — HIGH (ref 5–17)
AST SERPL-CCNC: 32 U/L — SIGNIFICANT CHANGE UP (ref 10–40)
BASE EXCESS BLDV CALC-SCNC: -0.4 MMOL/L — SIGNIFICANT CHANGE UP (ref -2–3)
BASOPHILS # BLD AUTO: 0 K/UL — SIGNIFICANT CHANGE UP (ref 0–0.2)
BASOPHILS NFR BLD AUTO: 0 % — SIGNIFICANT CHANGE UP (ref 0–2)
BILIRUB SERPL-MCNC: 0.6 MG/DL — SIGNIFICANT CHANGE UP (ref 0.2–1.2)
BUN SERPL-MCNC: 120 MG/DL — HIGH (ref 7–23)
CA-I SERPL-SCNC: 1.13 MMOL/L — LOW (ref 1.15–1.33)
CALCIUM SERPL-MCNC: 9.4 MG/DL — SIGNIFICANT CHANGE UP (ref 8.4–10.5)
CHLORIDE BLDV-SCNC: 89 MMOL/L — LOW (ref 96–108)
CHLORIDE SERPL-SCNC: 86 MMOL/L — LOW (ref 96–108)
CK SERPL-CCNC: 307 U/L — HIGH (ref 30–200)
CO2 BLDV-SCNC: 27 MMOL/L — HIGH (ref 22–26)
CO2 SERPL-SCNC: 21 MMOL/L — LOW (ref 22–31)
CREAT SERPL-MCNC: 3.47 MG/DL — HIGH (ref 0.5–1.3)
EGFR: 19 ML/MIN/1.73M2 — LOW
EOSINOPHIL # BLD AUTO: 0.61 K/UL — HIGH (ref 0–0.5)
EOSINOPHIL NFR BLD AUTO: 3.5 % — SIGNIFICANT CHANGE UP (ref 0–6)
GAS PNL BLDV: 123 MMOL/L — LOW (ref 136–145)
GAS PNL BLDV: SIGNIFICANT CHANGE UP
GAS PNL BLDV: SIGNIFICANT CHANGE UP
GLUCOSE BLDC GLUCOMTR-MCNC: 161 MG/DL — HIGH (ref 70–99)
GLUCOSE BLDC GLUCOMTR-MCNC: 188 MG/DL — HIGH (ref 70–99)
GLUCOSE BLDC GLUCOMTR-MCNC: 202 MG/DL — HIGH (ref 70–99)
GLUCOSE BLDC GLUCOMTR-MCNC: 209 MG/DL — HIGH (ref 70–99)
GLUCOSE BLDV-MCNC: 117 MG/DL — HIGH (ref 70–99)
GLUCOSE SERPL-MCNC: 112 MG/DL — HIGH (ref 70–99)
HCO3 BLDV-SCNC: 25 MMOL/L — SIGNIFICANT CHANGE UP (ref 22–29)
HCT VFR BLD CALC: 43 % — SIGNIFICANT CHANGE UP (ref 39–50)
HCT VFR BLDA CALC: 40 % — SIGNIFICANT CHANGE UP (ref 39–51)
HGB BLD CALC-MCNC: 13.3 G/DL — SIGNIFICANT CHANGE UP (ref 12.6–17.4)
HGB BLD-MCNC: 13.2 G/DL — SIGNIFICANT CHANGE UP (ref 13–17)
LACTATE BLDV-MCNC: 2.3 MMOL/L — HIGH (ref 0.5–2)
LYMPHOCYTES # BLD AUTO: 0.16 K/UL — LOW (ref 1–3.3)
LYMPHOCYTES # BLD AUTO: 0.9 % — LOW (ref 13–44)
MAGNESIUM SERPL-MCNC: 2.4 MG/DL — SIGNIFICANT CHANGE UP (ref 1.6–2.6)
MANUAL SMEAR VERIFICATION: SIGNIFICANT CHANGE UP
MCHC RBC-ENTMCNC: 23.3 PG — LOW (ref 27–34)
MCHC RBC-ENTMCNC: 30.7 GM/DL — LOW (ref 32–36)
MCV RBC AUTO: 75.8 FL — LOW (ref 80–100)
MONOCYTES # BLD AUTO: 1.09 K/UL — HIGH (ref 0–0.9)
MONOCYTES NFR BLD AUTO: 6.2 % — SIGNIFICANT CHANGE UP (ref 2–14)
NEUTROPHILS # BLD AUTO: 15.68 K/UL — HIGH (ref 1.8–7.4)
NEUTROPHILS NFR BLD AUTO: 89.4 % — HIGH (ref 43–77)
PCO2 BLDV: 45 MMHG — SIGNIFICANT CHANGE UP (ref 42–55)
PH BLDV: 7.36 — SIGNIFICANT CHANGE UP (ref 7.32–7.43)
PHOSPHATE SERPL-MCNC: 4.4 MG/DL — SIGNIFICANT CHANGE UP (ref 2.5–4.5)
PLAT MORPH BLD: NORMAL — SIGNIFICANT CHANGE UP
PLATELET # BLD AUTO: 170 K/UL — SIGNIFICANT CHANGE UP (ref 150–400)
PO2 BLDV: 48 MMHG — HIGH (ref 25–45)
POTASSIUM BLDV-SCNC: 3.1 MMOL/L — LOW (ref 3.5–5.1)
POTASSIUM SERPL-MCNC: 3.6 MMOL/L — SIGNIFICANT CHANGE UP (ref 3.5–5.3)
POTASSIUM SERPL-SCNC: 3.6 MMOL/L — SIGNIFICANT CHANGE UP (ref 3.5–5.3)
PROT SERPL-MCNC: 7.3 G/DL — SIGNIFICANT CHANGE UP (ref 6–8.3)
RBC # BLD: 5.67 M/UL — SIGNIFICANT CHANGE UP (ref 4.2–5.8)
RBC # FLD: 19.4 % — HIGH (ref 10.3–14.5)
RBC BLD AUTO: SIGNIFICANT CHANGE UP
SAO2 % BLDV: 77.3 % — SIGNIFICANT CHANGE UP (ref 67–88)
SODIUM SERPL-SCNC: 128 MMOL/L — LOW (ref 135–145)
WBC # BLD: 17.54 K/UL — HIGH (ref 3.8–10.5)
WBC # FLD AUTO: 17.54 K/UL — HIGH (ref 3.8–10.5)

## 2023-03-07 PROCEDURE — 99233 SBSQ HOSP IP/OBS HIGH 50: CPT

## 2023-03-07 PROCEDURE — 99223 1ST HOSP IP/OBS HIGH 75: CPT | Mod: GC

## 2023-03-07 RX ORDER — VANCOMYCIN HCL 1 G
1000 VIAL (EA) INTRAVENOUS EVERY 24 HOURS
Refills: 0 | Status: COMPLETED | OUTPATIENT
Start: 2023-03-07 | End: 2023-03-07

## 2023-03-07 RX ORDER — ACETAMINOPHEN 500 MG
325 TABLET ORAL ONCE
Refills: 0 | Status: COMPLETED | OUTPATIENT
Start: 2023-03-07 | End: 2023-03-07

## 2023-03-07 RX ORDER — METOPROLOL TARTRATE 50 MG
50 TABLET ORAL DAILY
Refills: 0 | Status: DISCONTINUED | OUTPATIENT
Start: 2023-03-07 | End: 2023-03-10

## 2023-03-07 RX ADMIN — PANTOPRAZOLE SODIUM 40 MILLIGRAM(S): 20 TABLET, DELAYED RELEASE ORAL at 06:13

## 2023-03-07 RX ADMIN — HEPARIN SODIUM 5000 UNIT(S): 5000 INJECTION INTRAVENOUS; SUBCUTANEOUS at 07:23

## 2023-03-07 RX ADMIN — Medication 1 PATCH: at 16:22

## 2023-03-07 RX ADMIN — ATORVASTATIN CALCIUM 80 MILLIGRAM(S): 80 TABLET, FILM COATED ORAL at 21:44

## 2023-03-07 RX ADMIN — HEPARIN SODIUM 5000 UNIT(S): 5000 INJECTION INTRAVENOUS; SUBCUTANEOUS at 23:35

## 2023-03-07 RX ADMIN — Medication 2: at 08:06

## 2023-03-07 RX ADMIN — Medication 1 APPLICATION(S): at 06:13

## 2023-03-07 RX ADMIN — CLOPIDOGREL BISULFATE 75 MILLIGRAM(S): 75 TABLET, FILM COATED ORAL at 12:32

## 2023-03-07 RX ADMIN — Medication 1 APPLICATION(S): at 17:56

## 2023-03-07 RX ADMIN — Medication 325 MILLIGRAM(S): at 01:23

## 2023-03-07 RX ADMIN — Medication 650 MILLIGRAM(S): at 15:21

## 2023-03-07 RX ADMIN — MEROPENEM 100 MILLIGRAM(S): 1 INJECTION INTRAVENOUS at 06:13

## 2023-03-07 RX ADMIN — Medication 325 MILLIGRAM(S): at 00:33

## 2023-03-07 RX ADMIN — Medication 50 MILLIGRAM(S): at 23:59

## 2023-03-07 RX ADMIN — CHLORHEXIDINE GLUCONATE 1 APPLICATION(S): 213 SOLUTION TOPICAL at 06:13

## 2023-03-07 RX ADMIN — Medication 1 PATCH: at 19:22

## 2023-03-07 RX ADMIN — Medication 650 MILLIGRAM(S): at 14:51

## 2023-03-07 RX ADMIN — HEPARIN SODIUM 5000 UNIT(S): 5000 INJECTION INTRAVENOUS; SUBCUTANEOUS at 16:55

## 2023-03-07 RX ADMIN — HYDROMORPHONE HYDROCHLORIDE 0.5 MILLIGRAM(S): 2 INJECTION INTRAMUSCULAR; INTRAVENOUS; SUBCUTANEOUS at 22:21

## 2023-03-07 RX ADMIN — HYDROMORPHONE HYDROCHLORIDE 0.5 MILLIGRAM(S): 2 INJECTION INTRAMUSCULAR; INTRAVENOUS; SUBCUTANEOUS at 04:02

## 2023-03-07 RX ADMIN — Medication 250 MILLIGRAM(S): at 08:45

## 2023-03-07 RX ADMIN — TAMSULOSIN HYDROCHLORIDE 0.4 MILLIGRAM(S): 0.4 CAPSULE ORAL at 21:44

## 2023-03-07 RX ADMIN — Medication 1 PATCH: at 06:50

## 2023-03-07 RX ADMIN — Medication 650 MILLIGRAM(S): at 17:30

## 2023-03-07 RX ADMIN — Medication 4: at 12:32

## 2023-03-07 RX ADMIN — HYDROMORPHONE HYDROCHLORIDE 0.5 MILLIGRAM(S): 2 INJECTION INTRAMUSCULAR; INTRAVENOUS; SUBCUTANEOUS at 21:54

## 2023-03-07 RX ADMIN — Medication 50 MILLIGRAM(S): at 00:33

## 2023-03-07 RX ADMIN — INSULIN GLARGINE 35 UNIT(S): 100 INJECTION, SOLUTION SUBCUTANEOUS at 21:45

## 2023-03-07 RX ADMIN — Medication 4: at 21:45

## 2023-03-07 RX ADMIN — Medication 1 PATCH: at 12:33

## 2023-03-07 RX ADMIN — Medication 650 MILLIGRAM(S): at 17:00

## 2023-03-07 RX ADMIN — Medication 81 MILLIGRAM(S): at 12:32

## 2023-03-07 RX ADMIN — HYDROMORPHONE HYDROCHLORIDE 0.5 MILLIGRAM(S): 2 INJECTION INTRAMUSCULAR; INTRAVENOUS; SUBCUTANEOUS at 04:19

## 2023-03-07 RX ADMIN — Medication 2: at 16:54

## 2023-03-07 NOTE — PROGRESS NOTE ADULT - SUBJECTIVE AND OBJECTIVE BOX
[facility]   [phone]  RENAL FOLLOW UP NOTE  --------------------------------------------------------------------------------  Chief Complaint:    24 hour events/subjective:    seen earlier   still with c/o itching   states left leg weeping     PAST HISTORY  --------------------------------------------------------------------------------  No significant changes to PMH, PSH, FHx, SHx, unless otherwise noted    ALLERGIES & MEDICATIONS  --------------------------------------------------------------------------------  Allergies    ceftriaxone (Rash)  Zosyn (Pruritus)    Intolerances      Standing Inpatient Medications  ammonium lactate 12% Lotion 1 Application(s) Topical two times a day  aspirin  chewable 81 milliGRAM(s) Oral daily  atorvastatin 80 milliGRAM(s) Oral at bedtime  chlorhexidine 4% Liquid 1 Application(s) Topical <User Schedule>  clopidogrel Tablet 75 milliGRAM(s) Oral daily  heparin   Injectable 5000 Unit(s) SubCutaneous every 8 hours  insulin glargine Injectable (LANTUS) 35 Unit(s) SubCutaneous at bedtime  insulin lispro (ADMELOG) corrective regimen sliding scale   SubCutaneous Before meals and at bedtime  metoprolol succinate ER 50 milliGRAM(s) Oral daily  nicotine -   7 mG/24Hr(s) Patch 1 Patch Transdermal daily  pantoprazole    Tablet 40 milliGRAM(s) Oral before breakfast  tamsulosin 0.4 milliGRAM(s) Oral at bedtime  triamcinolone 0.1% Cream 1 Application(s) Topical every 12 hours    PRN Inpatient Medications  acetaminophen     Tablet .. 650 milliGRAM(s) Oral every 6 hours PRN  acetaminophen     Tablet .. 650 milliGRAM(s) Oral every 6 hours PRN  acetaminophen  Suppository .. 650 milliGRAM(s) Rectal once PRN  albuterol    90 MICROgram(s) HFA Inhaler 2 Puff(s) Inhalation three times a day PRN  dextrose 50% Injectable 25 Gram(s) IV Push every 15 minutes PRN  diphenhydrAMINE 50 milliGRAM(s) Oral every 4 hours PRN  HYDROmorphone  Injectable 0.5 milliGRAM(s) IV Push every 6 hours PRN      REVIEW OF SYSTEMS  --------------------------------------------------------------------------------    Gen: denies  fevers/chills,  CVS: denies chest pain/palpitations  Resp: denies SOB/Cough  GI: Denies N/V/Abd pain  : Denies dysuria    VITALS/PHYSICAL EXAM  --------------------------------------------------------------------------------  T(C): 36.3 (03-07-23 @ 15:06), Max: 36.8 (03-07-23 @ 00:28)  HR: 104 (03-07-23 @ 15:06) (99 - 104)  BP: 116/63 (03-07-23 @ 21:56) (91/50 - 116/63)  RR: 18 (03-07-23 @ 15:06) (18 - 18)  SpO2: 95% (03-07-23 @ 15:06) (92% - 95%)  Wt(kg): --        03-06-23 @ 07:01  -  03-07-23 @ 07:00  --------------------------------------------------------  IN: 970 mL / OUT: 2150 mL / NET: -1180 mL    03-07-23 @ 07:01  -  03-07-23 @ 22:30  --------------------------------------------------------  IN: 250 mL / OUT: 1300 mL / NET: -1050 mL      Physical Exam:  	  Gen:  on RA   	Pulm: decrease bs  no rales or ronchi or wheezing  	CV: no JVD. RRR, S1S2; no rub  	Abd: +BS, soft, nontender/nondistended, obese  	UE: Warm, no cyanosis  no clubbing,  no edema;   	LE: Warm, no cyanosis  no clubbing, no edema, RLE erhythema + skin ulcer present on admission as well   	Neuro: alert and oriented.  	Skin:  + rash macular back thoracic and left leg area. Left leg is significantly  erythematous and with left leg edema weeping at ankle left       LABS/STUDIES  --------------------------------------------------------------------------------              13.2   17.54 >-----------<  170      [03-07-23 @ 06:50]              43.0     128  |  86  |  120  ----------------------------<  112      [03-07-23 @ 06:50]  3.6   |  21  |  3.47        Ca     9.4     [03-07-23 @ 06:50]      Mg     2.4     [03-07-23 @ 06:50]      Phos  4.4     [03-07-23 @ 06:50]    TPro  7.3  /  Alb  3.4  /  TBili  0.6  /  DBili  x   /  AST  32  /  ALT  44  /  AlkPhos  298  [03-07-23 @ 06:50]    PT/INR: PT 15.8 , INR 1.36       [03-06-23 @ 00:38]  PTT: 32.3       [03-06-23 @ 00:38]          [03-07-23 @ 06:50]    Creatinine Trend:  SCr 3.47 [03-07 @ 06:50]  SCr 3.27 [03-06 @ 17:26]  SCr 3.35 [03-06 @ 07:38]  SCr 3.53 [03-06 @ 00:38]  SCr 3.55 [03-05 @ 17:27]              Urinalysis - [03-01-23 @ 18:53]      Color Yellow / Appearance Clear / SG 1.022 / pH 6.0      Gluc 500 mg/dL / Ketone Negative  / Bili Small / Urobili 3 mg/dL       Blood Negative / Protein 100 mg/dl / Leuk Est Negative / Nitrite Negative      RBC 2 / WBC 1 / Hyaline 0 / Gran  / Sq Epi  / Non Sq Epi 0 / Bacteria Negative      Iron 14, TIBC --, %sat --      [03-02-23 @ 01:20]  Ferritin 225      [03-02-23 @ 01:18]  PTH -- (Ca 8.9)      [10-02-22 @ 07:04]   73  HbA1c 8.9      [12-16-19 @ 08:15]  TSH 3.02      [09-30-22 @ 13:45]

## 2023-03-07 NOTE — CONSULT NOTE ADULT - ASSESSMENT
Assessment:     Plan:       The patient's chart was reviewed in addition to being seen and examined at bedside with the dermatology attending  _______________ .  Recommendations were communicated with the primary team.  Please page [phone] with a 10-digit call-back number for further related questions.    Thank you for allowing us to participate in the care of this patient.  Please re-consult Dermatology for any new or worsening developments.    Vanesa Pedro MD, RACHEAL  Resident Physician, PGY-2  [facility] Dermatology  Pager: [phone]  Office: [phone]    The above is a preliminary note, recommendations are not final until this note is signed.   Assessment: Favor resolving bullous cellulitis, left lower extremity with underlying venous stasis and exanthematous drug eruption likely 2/2 vancomycin     Plan:   - Recommend triamcinolone 0.1% CREAM applied to affected areas BID for two weeks on, one week off   - Rash on left lower extremity appears consistent with a resolving bullous cellulitis, would defer recommended antibiotic treatment course to ID   - Recommend trending CBC with differential and CMP daily     The patient's chart was reviewed in addition to being seen and examined at bedside with the dermatology attending Dr. Day.  Recommendations were communicated with the primary team.  Please page [phone] with a 10-digit call-back number for further related questions.    Thank you for allowing us to participate in the care of this patient.  Please re-consult Dermatology for any new or worsening developments.    Vanesa Pedro MD, RACHEAL  Resident Physician, PGY-2  [facility] Dermatology  Pager: [phone]  Office: [phone]    The above is a preliminary note, recommendations are not final until this note is signed.   Assessment: Favor resolving bullous cellulitis, left lower extremity with underlying venous stasis and exanthematous drug eruption likely 2/2 vancomycin     Plan:   - Recommend triamcinolone 0.1% CREAM applied to affected areas BID for two weeks on, one week off   - Rash on left lower extremity appears consistent with a resolving bullous cellulitis, would defer recommended antibiotic treatment course to ID   - Recommend trending CBC with differential and CMP daily     The patient's chart was reviewed in addition to being seen and examined at bedside with the dermatology attending Dr. Day.  Recommendations were communicated with the primary team.  Please page [phone] with a 10-digit call-back number for further related questions.    Thank you for allowing us to participate in the care of this patient.  Please re-consult Dermatology for any new or worsening developments.    Vanesa Pedro MD, RACHEAL  Resident Physician, PGY-2  [facility] Dermatology  Pager: [phone]  Office: [phone]

## 2023-03-07 NOTE — PROGRESS NOTE ADULT - ASSESSMENT
Patient is a 62 year old male with PMHx of CHFrEF more recently 29%, has AICD, cardiomyopathy due to ischemia, Diabetes, COPD who was referred to hospital due to concerns for septic shock.    1) Sepsis with unclear source  On vancomycin and meropenem at present.  Vanc trough and vanc dosing was mistimed.   Vancomycin ordered this AM, follow vanc trough tomorrow AM  - May need increase dosing of vanc, keep q24 given CrCl    2) acute on chronic kidney injury  - stabilized  - discussed with nephro keep off diuretics and observe    3) CHFrEF  Off pressors  Repeat echo with ef 25  - Resumed metoprolol  - Need Heart failure consult    4) DMII  - getting better controlled  - On basal insulin and sliding scale.  - Titrate as per blood glucose levels    5) COPD  - Controlled with ERVIN prn  - Few symptoms, minimal exacerbations, and no recent hospitalizations due to exacerbation  - nicotine patches and gum as needed  - No wheezing on exam, however now hypoxia- probably due to chf    6) Right Hydronephrosis  - no evidence of stone, more consistent with chronic outlet obstruction  - Routine bladder scan avoid urinary retention.  - uro follow up outpatient- add finasteride to BPH regimen?      7) Anxiety  - controlled with low dose Xanax at home 0.25mg    8) hyponatremia  - continue hold Diuretics  - appreciate nephro f.u    9)thrombocytopenia  - stable, continue to monitor

## 2023-03-07 NOTE — PROGRESS NOTE ADULT - ASSESSMENT
62 year old male with pmh of HFrEF with an EF of 19% status post AICD, CAD status post stents, DM2, HTN, COPD with a 30+ pack year history of smoking presents to the ED with shortness of breath.       1- MANJIT on CKD i,e ATN   2- sepsis  3- CHF  4- DM2  5- chronic R hydro  6- Left leg cellulitis  7- rash     MANJIT on ckd III in setting of sepsis/fevers/hypotension   has hx of cardiomyopathy with ef < 20 %    suspect possible cephalosporin rash on thorax   on meropenem now  cont   cpk not elevated   cr is still high   hyponatremia is improving   holding diuretics today however he will need them shortly   cont O2   insulin s-s-   strict I/O  pan culture  NGTD   d/w primary team at bedside

## 2023-03-07 NOTE — PROGRESS NOTE ADULT - SUBJECTIVE AND OBJECTIVE BOX
[facility]  Lul Cruz  Office: [phone]  Cell: [phone]  Can also be reached on [employer] Teams       INTERVAL HPI/OVERNIGHT EVENTS: Transferred from ICU yesterday night. Pain in the left lower extrenity improving, not as painful. Breathing is stable, no palpiations or chest pain. Peeing a lot. No fever or chills or night sweats. No numbness or tingling in his lower extremity.      REVIEW OF SYSTEMS:  Negative except per HPI    Vital Signs Last 24 Hrs  T(C): 36.6 (07 Mar 2023 10:00), Max: 36.9 (06 Mar 2023 12:00)  T(F): 97.8 (07 Mar 2023 10:00), Max: 98.4 (06 Mar 2023 12:00)  HR: 99 (07 Mar 2023 10:00) (99 - 107)  BP: 108/63 (07 Mar 2023 10:00) (91/50 - 112/70)  BP(mean): 67 (06 Mar 2023 20:00) (67 - 81)  RR: 18 (07 Mar 2023 10:00) (14 - 19)  SpO2: 93% (07 Mar 2023 10:00) (92% - 96%)    Parameters below as of 07 Mar 2023 10:00  Patient On (Oxygen Delivery Method): room air        PHYSICAL EXAMINATION:  GENERAL: NAD, well built  HEAD:  Atraumatic, Normocephalic  EYES:  conjunctiva and sclera clear  NECK: Supple, No JVD, Normal thyroid  CHEST/LUNG: Diminished breath sounds some mild rhonchi  HEART: Regular rate and rhythm; tachycardic  ABDOMEN:nontender, getting increasingly tense and firm,  NERVOUS SYSTEM:  Alert & Oriented X3,    EXTREMITIES:  2Left lower extremity with discoloration, watery discharge. Superficial layer of skin appears to be sloughing off.                          13.2   17.54 )-----------( 170      ( 07 Mar 2023 06:50 )             43.0     03-07    128<L>  |  86<L>  |  120<H>  ----------------------------<  112<H>  3.6   |  21<L>  |  3.47<H>    Ca    9.4      07 Mar 2023 06:50  Phos  4.4     03-07  Mg     2.4     03-07    TPro  7.3  /  Alb  3.4  /  TBili  0.6  /  DBili  x   /  AST  32  /  ALT  44  /  AlkPhos  298<H>  03-07    LIVER FUNCTIONS - ( 07 Mar 2023 06:50 )  Alb: 3.4 g/dL / Pro: 7.3 g/dL / ALK PHOS: 298 U/L / ALT: 44 U/L / AST: 32 U/L / GGT: x           CARDIAC MARKERS ( 07 Mar 2023 06:50 )  x     / x     / 307 U/L / x     / x      CARDIAC MARKERS ( 06 Mar 2023 17:26 )  x     / x     / 369 U/L / x     / x      CARDIAC MARKERS ( 06 Mar 2023 07:38 )  x     / x     / 362 U/L / x     / x          PT/INR - ( 06 Mar 2023 00:38 )   PT: 15.8 sec;   INR: 1.36 ratio         PTT - ( 06 Mar 2023 00:38 )  PTT:32.3 sec    CAPILLARY BLOOD GLUCOSE      RADIOLOGY & ADDITIONAL TESTS:

## 2023-03-07 NOTE — CONSULT NOTE ADULT - SUBJECTIVE AND OBJECTIVE BOX
HPI:  This is a 62 year old male with pmh of HFrEF with an EF of 19% status post AICD, CAD status post stents, DM 2, HTN, COPD with a 30+ pack year history of smoking presents to the ED with shortness of breath that started around 5pm after dinner. Also vomited x3. 1x diarrhea.  As per EMS, patient was noted to be hypoxic to 85% on room air started on nasal cannula.  Noted to be hypotensive in the field and immediately brought to the ED for further evaluation. Endorses chornic cough and feeling more fatigued than usual but otherwise denies any chest pain, abd pain, headaches, numbness/tingling. Of note, patient has history of chronic ulcer of R 3rd foot and L foot 5th digit dorsal wound  In the ED, febrile to 38.2 C, started on 6L nasal cannula, saturating in mid 90s. Noted to be febrile. Systolic BP to be in 70s and 90s. 500 mL bolus given with no adequate response. Levo gtt started. MAP of 75 on levo 0.05. WBC elevated at 19k, Hgb of 12.8, lactate elevated at 2.3, Trop at 101 and BNP at 6243. No signs of UTI on the urinalysis. VBG with pH 7.36/41/45/23. Chest x ray clear. CT angio chest shows no evidence of PE detected, however showing small R pleural effusion and severe right hydronephrosis. CT abd/pelv w/o contrast pending.   s/p Empiric Cefepime 1g and Vanc 1g, Tylenol 1g.    (01 Mar 2023 22:08)    Dermatology consulted for an acute onset rash that started 3/4/23. Rash started on the chest and back and has spread to the face and extremities. Rash is very itchy and the skin feels tight. Pt was recently downgraded from the ICU. Was on on cefepime then meropenem and vancomycin for management of sepsis. Pt denies any SOB, chest pain, fevers, chills, abdominal pain.     Drug chart:     ADMINISTERED MEDS (ANTIMICROBIAL):   cefepime   IVPB: 100 IV Intermittent (03-01 – 03-03)   cefTRIAXone   IVPB: 100 IV Intermittent (03-04),  100 IV Intermittent (03-03)  meropenem  IVPB: 100 IV Intermittent (03-05 - 03-07)   vancomycin  IVPB: 250 IV Intermittent (03-01, 03-03, 03-05, 03-06, 03/07)    ADMINISTERED MEDS (NEUROLOGIC):   acetaminophen     Tablet ..: 975 Oral (03-01 - 03-07)   diphenhydrAMINE: 25 Oral (03-05 – 03-07)   HYDROmorphone  Injectable: 0.25 IV Push (03-04 – 03-07)   ondansetron Injectable: 4 IV Push (03-03)    ADMINISTERED MEDS (OTHER):   aluminum hydroxide/magnesium hydroxide/simethicone Suspension: 30 Oral (03-01)  ammonium lactate 12% Lotion: 1 Topical (03-07),  1 Topical (03-06)  aspirin  chewable: 81 Oral (03-07),  81 Oral (03-06),  81 Oral (03-05),  81 Oral (03-04),  81 Oral (03-03),  81 Oral (03-02)  atorvastatin: 80 Oral (03-06),  80 Oral (03-05),  80 Oral (03-04),  80 Oral (03-03),  80 Oral (03-02)  buMETAnide Infusion: 5 IV Continuous (03-05),  5 IV Continuous (03-05),  10 IV Continuous (03-04),  10 IV Continuous (03-04),  10 IV Continuous (03-04) (03-03)  chlorhexidine 4% Liquid: 1 Topical (03-07),  1 Topical (03-06),  1 Topical (03-05),  1 Topical (03-04),  1 Topical (03-03),  1 Topical (03-02)  clopidogrel Tablet: 75 Oral (03-07),  75 Oral (03-06),  75 Oral (03-05),  75 Oral (03-04),  75 Oral (03-03),  75 Oral (03-02)  enoxaparin Injectable: 40 SubCutaneous (03-02)  heparin subcutaneous (03-02 – 03-07)  hydrocortisone 2.5% Ointment: 1 Topical (03-06),  1 Topical (03-05)  insulin glargine and lantus since 3-02   metolazone: 10 Oral (03-04)  norepinephrine Infusion: 7 IV Continuous (03-01),  7 IV Continuous (03-01)  pantoprazole    Tablet: 40 Oral (03-02 – 03-07)       PAST MEDICAL & SURGICAL HISTORY:  Stented coronary artery      Diabetes      AICD (automatic cardioverter/defibrillator) present      Hypertension      Heart failure with reduced ejection fraction      History of ischemic cardiomyopathy      History of COPD      H/O gastroesophageal reflux (GERD)      H/O vasectomy  20 yrs ago (2000)          REVIEW OF SYSTEMS    General: no fevers/chills, no lethargy	    Skin/Breast: see HPI  	  Ophthalmologic: no eye pain or change in vision  	  ENMT: no dysphagia or change in hearing    Respiratory and Thorax: no SOB or cough  	  Cardiovascular: no palpitations or chest pain    Gastrointestinal: no abdominal pain or blood in stool     Genitourinary: no dysuria or frequency    Musculoskeletal: no joint pains or weakness	    Neurological: no weakness, numbness, or tingling      MEDICATIONS  (STANDING):  ammonium lactate 12% Lotion 1 Application(s) Topical two times a day  aspirin  chewable 81 milliGRAM(s) Oral daily  atorvastatin 80 milliGRAM(s) Oral at bedtime  chlorhexidine 4% Liquid 1 Application(s) Topical <User Schedule>  clopidogrel Tablet 75 milliGRAM(s) Oral daily  heparin   Injectable 5000 Unit(s) SubCutaneous every 8 hours  insulin glargine Injectable (LANTUS) 35 Unit(s) SubCutaneous at bedtime  insulin lispro (ADMELOG) corrective regimen sliding scale   SubCutaneous Before meals and at bedtime  metoprolol succinate ER 50 milliGRAM(s) Oral daily  nicotine -   7 mG/24Hr(s) Patch 1 Patch Transdermal daily  pantoprazole    Tablet 40 milliGRAM(s) Oral before breakfast  tamsulosin 0.4 milliGRAM(s) Oral at bedtime    MEDICATIONS  (PRN):  acetaminophen     Tablet .. 650 milliGRAM(s) Oral every 6 hours PRN Temp greater or equal to 38C (100.4F), Mild Pain (1 - 3)  acetaminophen     Tablet .. 650 milliGRAM(s) Oral every 6 hours PRN Mild Pain (1 - 3), Moderate Pain (4 - 6)  acetaminophen  Suppository .. 650 milliGRAM(s) Rectal once PRN Temp greater or equal to 38C (100.4F)  albuterol    90 MICROgram(s) HFA Inhaler 2 Puff(s) Inhalation three times a day PRN Shortness of Breath and/or Wheezing  dextrose 50% Injectable 25 Gram(s) IV Push every 15 minutes PRN blood glucose <70  diphenhydrAMINE 50 milliGRAM(s) Oral every 4 hours PRN Rash and/or Itching  hydrocortisone 1% Cream 1 Application(s) Topical every 6 hours PRN Rash and/or Itching  HYDROmorphone  Injectable 0.5 milliGRAM(s) IV Push every 6 hours PRN Severe Pain (7 - 10)      Allergies    ceftriaxone (Rash)  Zosyn (Pruritus)    Intolerances        SOCIAL HISTORY:    FAMILY HISTORY:  Family history of COPD (chronic obstructive pulmonary disease) (Sibling)    Family history of cardiac disorder  Paternal        Vital Signs Last 24 Hrs  T(C): 36.6 (07 Mar 2023 13:00), Max: 36.9 (06 Mar 2023 20:00)  T(F): 97.9 (07 Mar 2023 13:00), Max: 98.4 (06 Mar 2023 20:00)  HR: 102 (07 Mar 2023 13:00) (99 - 107)  BP: 92/53 (07 Mar 2023 13:00) (91/50 - 112/70)  BP(mean): 67 (06 Mar 2023 20:00) (67 - 81)  RR: 18 (07 Mar 2023 13:00) (18 - 19)  SpO2: 93% (07 Mar 2023 13:00) (92% - 95%)    Parameters below as of 07 Mar 2023 13:00  Patient On (Oxygen Delivery Method): room air        PHYSICAL EXAM:    General: Well appearing, well nourished, in no apparent distress  HEENT: Normocephalic, thyroid not visibly enlarged, conjunctiva not injected, oropharynx clear without ulcerations  CV: No lower extremity edema, extremities warm and well perfused, +dorsalis pedis pulses  Resp: Non labored breathing, no clubbing of extremities  GI: Non distended abdomen, no palpable hepatosplenomegaly  Lymph: No visible lymphadenopathy  Neuro: Alert and oriented x3  Skin:     T(C): 36.6 (03-07-23 @ 13:00), Max: 36.9 (03-06-23 @ 20:00)  HR: 102 (03-07-23 @ 13:00) (99 - 107)  BP: 92/53 (03-07-23 @ 13:00) (91/50 - 112/70)  RR: 18 (03-07-23 @ 13:00) (18 - 19)  SpO2: 93% (03-07-23 @ 13:00) (92% - 95%)    LABS:                        13.2   17.54 )-----------( 170      ( 07 Mar 2023 06:50 )             43.0     03-07    128<L>  |  86<L>  |  120<H>  ----------------------------<  112<H>  3.6   |  21<L>  |  3.47<H>    Ca    9.4      07 Mar 2023 06:50  Phos  4.4     03-07  Mg     2.4     03-07    TPro  7.3  /  Alb  3.4  /  TBili  0.6  /  DBili  x   /  AST  32  /  ALT  44  /  AlkPhos  298<H>  03-07    PT/INR - ( 06 Mar 2023 00:38 )   PT: 15.8 sec;   INR: 1.36 ratio         PTT - ( 06 Mar 2023 00:38 )  PTT:32.3 sec      RADIOLOGY & ADDITIONAL STUDIES:    _______________________________________________________    DERM CONSULT/HPI:     SKIN EXAM:     ASSESSMENT/PLAN:     At this time:     - Recommend triamcinolone 0.1% ointment BID for up to two weeks  - Recommend ceritirizine 10mg daily   - Can follow-up with dermatology as an outpatient    HPI:  This is a 62 year old male with pmh of HFrEF with an EF of 19% status post AICD, CAD status post stents, DM 2, HTN, COPD with a 30+ pack year history of smoking presents to the ED with shortness of breath that started around 5pm after dinner. Also vomited x3. 1x diarrhea.  As per EMS, patient was noted to be hypoxic to 85% on room air started on nasal cannula.  Noted to be hypotensive in the field and immediately brought to the ED for further evaluation. Endorses chornic cough and feeling more fatigued than usual but otherwise denies any chest pain, abd pain, headaches, numbness/tingling. Of note, patient has history of chronic ulcer of R 3rd foot and L foot 5th digit dorsal wound  In the ED, febrile to 38.2 C, started on 6L nasal cannula, saturating in mid 90s. Noted to be febrile. Systolic BP to be in 70s and 90s. 500 mL bolus given with no adequate response. Levo gtt started. MAP of 75 on levo 0.05. WBC elevated at 19k, Hgb of 12.8, lactate elevated at 2.3, Trop at 101 and BNP at 6243. No signs of UTI on the urinalysis. VBG with pH 7.36/41/45/23. Chest x ray clear. CT angio chest shows no evidence of PE detected, however showing small R pleural effusion and severe right hydronephrosis. CT abd/pelv w/o contrast pending.   s/p Empiric Cefepime 1g and Vanc 1g, Tylenol 1g.    (01 Mar 2023 22:08)    Dermatology consulted for a rash on the left lower extremity that developed on 3/4/23. Rash is painful. Primary team concerned for cellulitis. Pt also developed an diffuse rash on the trunk and extremities around the same time. The rash started on the chest and back and subsequently spread to the face and extremities. Pt notes the rash is itchy and the skin feels tight. Pt was recently downgraded from the ICU where he was being treated for sepsis with abx and pressors. Pt was on cefepime from 3/1 - 3/3 then switched to ceftriaxone. Pt has been on vancomycin intermittently since 3/1/23. Pt was also started on meropenem on 3/5. Pt denies any SOB, chest pain, fevers, chills, abdominal pain.     PAST MEDICAL & SURGICAL HISTORY:  Stented coronary artery      Diabetes      AICD (automatic cardioverter/defibrillator) present      Hypertension      Heart failure with reduced ejection fraction      History of ischemic cardiomyopathy      History of COPD      H/O gastroesophageal reflux (GERD)      H/O vasectomy  20 yrs ago (2000)          REVIEW OF SYSTEMS    General: no fevers/chills, no lethargy	    Skin/Breast: see HPI  	  Ophthalmologic: no eye pain or change in vision  	  ENMT: no dysphagia or change in hearing    Respiratory and Thorax: no SOB or cough  	  Cardiovascular: no palpitations or chest pain    Gastrointestinal: no abdominal pain or blood in stool     Genitourinary: no dysuria or frequency    Musculoskeletal: no joint pains or weakness	    Neurological: no weakness, numbness, or tingling      MEDICATIONS  (STANDING):  ammonium lactate 12% Lotion 1 Application(s) Topical two times a day  aspirin  chewable 81 milliGRAM(s) Oral daily  atorvastatin 80 milliGRAM(s) Oral at bedtime  chlorhexidine 4% Liquid 1 Application(s) Topical <User Schedule>  clopidogrel Tablet 75 milliGRAM(s) Oral daily  heparin   Injectable 5000 Unit(s) SubCutaneous every 8 hours  insulin glargine Injectable (LANTUS) 35 Unit(s) SubCutaneous at bedtime  insulin lispro (ADMELOG) corrective regimen sliding scale   SubCutaneous Before meals and at bedtime  metoprolol succinate ER 50 milliGRAM(s) Oral daily  nicotine -   7 mG/24Hr(s) Patch 1 Patch Transdermal daily  pantoprazole    Tablet 40 milliGRAM(s) Oral before breakfast  tamsulosin 0.4 milliGRAM(s) Oral at bedtime    MEDICATIONS  (PRN):  acetaminophen     Tablet .. 650 milliGRAM(s) Oral every 6 hours PRN Temp greater or equal to 38C (100.4F), Mild Pain (1 - 3)  acetaminophen     Tablet .. 650 milliGRAM(s) Oral every 6 hours PRN Mild Pain (1 - 3), Moderate Pain (4 - 6)  acetaminophen  Suppository .. 650 milliGRAM(s) Rectal once PRN Temp greater or equal to 38C (100.4F)  albuterol    90 MICROgram(s) HFA Inhaler 2 Puff(s) Inhalation three times a day PRN Shortness of Breath and/or Wheezing  dextrose 50% Injectable 25 Gram(s) IV Push every 15 minutes PRN blood glucose <70  diphenhydrAMINE 50 milliGRAM(s) Oral every 4 hours PRN Rash and/or Itching  hydrocortisone 1% Cream 1 Application(s) Topical every 6 hours PRN Rash and/or Itching  HYDROmorphone  Injectable 0.5 milliGRAM(s) IV Push every 6 hours PRN Severe Pain (7 - 10)      Allergies    ceftriaxone (Rash)  Zosyn (Pruritus)    Intolerances        SOCIAL HISTORY:    FAMILY HISTORY:  Family history of COPD (chronic obstructive pulmonary disease) (Sibling)    Family history of cardiac disorder  Paternal        Vital Signs Last 24 Hrs  T(C): 36.6 (07 Mar 2023 13:00), Max: 36.9 (06 Mar 2023 20:00)  T(F): 97.9 (07 Mar 2023 13:00), Max: 98.4 (06 Mar 2023 20:00)  HR: 102 (07 Mar 2023 13:00) (99 - 107)  BP: 92/53 (07 Mar 2023 13:00) (91/50 - 112/70)  BP(mean): 67 (06 Mar 2023 20:00) (67 - 81)  RR: 18 (07 Mar 2023 13:00) (18 - 19)  SpO2: 93% (07 Mar 2023 13:00) (92% - 95%)    Parameters below as of 07 Mar 2023 13:00  Patient On (Oxygen Delivery Method): room air        PHYSICAL EXAM:    General: Well appearing, well nourished, in no apparent distress  HEENT: Normocephalic, thyroid not visibly enlarged, conjunctiva not injected, oropharynx clear without ulcerations  CV: No lower extremity edema, extremities warm and well perfused, +dorsalis pedis pulses  Resp: Non labored breathing, no clubbing of extremities  GI: Non distended abdomen, no palpable hepatosplenomegaly  Lymph: No visible lymphadenopathy  Neuro: Alert and oriented x3    Of note, on skin examination: Deep pink plaque with resolving bullae on the L lower extremity with lower extremity edema. Diffuse morbiliform eruption with confluence on the trunk and extremities      T(C): 36.6 (03-07-23 @ 13:00), Max: 36.9 (03-06-23 @ 20:00)  HR: 102 (03-07-23 @ 13:00) (99 - 107)  BP: 92/53 (03-07-23 @ 13:00) (91/50 - 112/70)  RR: 18 (03-07-23 @ 13:00) (18 - 19)  SpO2: 93% (03-07-23 @ 13:00) (92% - 95%)    LABS:                        13.2   17.54 )-----------( 170      ( 07 Mar 2023 06:50 )             43.0     03-07    128<L>  |  86<L>  |  120<H>  ----------------------------<  112<H>  3.6   |  21<L>  |  3.47<H>    Ca    9.4      07 Mar 2023 06:50  Phos  4.4     03-07  Mg     2.4     03-07    TPro  7.3  /  Alb  3.4  /  TBili  0.6  /  DBili  x   /  AST  32  /  ALT  44  /  AlkPhos  298<H>  03-07    PT/INR - ( 06 Mar 2023 00:38 )   PT: 15.8 sec;   INR: 1.36 ratio         PTT - ( 06 Mar 2023 00:38 )  PTT:32.3 sec        ADMINISTERED MEDS (ANTIMICROBIAL):   cefepime   IVPB: 100 IV Intermittent (03-01 – 03-03)   cefTRIAXone   IVPB: 100 IV Intermittent (03-04),  100 IV Intermittent (03-03)  meropenem  IVPB: 100 IV Intermittent (03-05 - 03-07)   vancomycin  IVPB: 250 IV Intermittent (03-01, 03-03, 03-05, 03-06, 03/07)    ADMINISTERED MEDS (NEUROLOGIC):   acetaminophen     Tablet ..: 975 Oral (03-01 - 03-07)   diphenhydrAMINE: 25 Oral (03-05 – 03-07)   HYDROmorphone  Injectable: 0.25 IV Push (03-04 – 03-07)   ondansetron Injectable: 4 IV Push (03-03)    ADMINISTERED MEDS (OTHER):   aluminum hydroxide/magnesium hydroxide/simethicone Suspension: 30 Oral (03-01)  ammonium lactate 12% Lotion: 1 Topical (03-07),  1 Topical (03-06)  aspirin  chewable: 81 Oral (03-07),  81 Oral (03-06),  81 Oral (03-05),  81 Oral (03-04),  81 Oral (03-03),  81 Oral (03-02)  atorvastatin: 80 Oral (03-06),  80 Oral (03-05),  80 Oral (03-04),  80 Oral (03-03),  80 Oral (03-02)  buMETAnide Infusion: 5 IV Continuous (03-05),  5 IV Continuous (03-05),  10 IV Continuous (03-04),  10 IV Continuous (03-04),  10 IV Continuous (03-04) (03-03)  chlorhexidine 4% Liquid: 1 Topical (03-07),  1 Topical (03-06),  1 Topical (03-05),  1 Topical (03-04),  1 Topical (03-03),  1 Topical (03-02)  clopidogrel Tablet: 75 Oral (03-07),  75 Oral (03-06),  75 Oral (03-05),  75 Oral (03-04),  75 Oral (03-03),  75 Oral (03-02)  enoxaparin Injectable: 40 SubCutaneous (03-02)  heparin subcutaneous (03-02 – 03-07)  hydrocortisone 2.5% Ointment: 1 Topical (03-06),  1 Topical (03-05)  insulin glargine and lantus since 3-02   metolazone: 10 Oral (03-04)  norepinephrine Infusion: 7 IV Continuous (03-01),  7 IV Continuous (03-01)  pantoprazole    Tablet: 40 Oral (03-02 – 03-07)        HPI:  This is a 62 year old male with pmh of HFrEF with an EF of 19% status post AICD, CAD status post stents, DM 2, HTN, COPD with a 30+ pack year history of smoking presents to the ED with shortness of breath that started around 5pm after dinner. Also vomited x3. 1x diarrhea.  As per EMS, patient was noted to be hypoxic to 85% on room air started on nasal cannula.  Noted to be hypotensive in the field and immediately brought to the ED for further evaluation. Endorses chornic cough and feeling more fatigued than usual but otherwise denies any chest pain, abd pain, headaches, numbness/tingling. Of note, patient has history of chronic ulcer of R 3rd foot and L foot 5th digit dorsal wound  In the ED, febrile to 38.2 C, started on 6L nasal cannula, saturating in mid 90s. Noted to be febrile. Systolic BP to be in 70s and 90s. 500 mL bolus given with no adequate response. Levo gtt started. MAP of 75 on levo 0.05. WBC elevated at 19k, Hgb of 12.8, lactate elevated at 2.3, Trop at 101 and BNP at 6243. No signs of UTI on the urinalysis. VBG with pH 7.36/41/45/23. Chest x ray clear. CT angio chest shows no evidence of PE detected, however showing small R pleural effusion and severe right hydronephrosis. CT abd/pelv w/o contrast pending.   s/p Empiric Cefepime 1g and Vanc 1g, Tylenol 1g.    (01 Mar 2023 22:08)    Dermatology consulted for a rash on the left lower extremity that developed on 3/4/23. Rash is painful. Primary team concerned for cellulitis. Pt also developed an diffuse rash on the trunk and extremities around the same time. The rash started on the chest and back and subsequently spread to the face and extremities. Pt notes the rash is itchy and the skin feels tight. Pt was recently downgraded from the ICU where he was being treated for sepsis with abx and pressors. Pt was on cefepime from 3/1 - 3/3 then switched to ceftriaxone. Pt has been on vancomycin intermittently since 3/1/23. Pt was also started on meropenem on 3/5. Pt denies any SOB, chest pain, fevers, chills, abdominal pain.     PAST MEDICAL & SURGICAL HISTORY:  Stented coronary artery      Diabetes      AICD (automatic cardioverter/defibrillator) present      Hypertension      Heart failure with reduced ejection fraction      History of ischemic cardiomyopathy      History of COPD      H/O gastroesophageal reflux (GERD)      H/O vasectomy  20 yrs ago (2000)          REVIEW OF SYSTEMS    General: no fevers/chills, no lethargy	    Skin/Breast: see HPI  	  Ophthalmologic: no eye pain or change in vision  	  ENMT: no dysphagia or change in hearing    Respiratory and Thorax: no SOB or cough  	  Cardiovascular: no palpitations or chest pain    Gastrointestinal: no abdominal pain or blood in stool     Genitourinary: no dysuria or frequency    Musculoskeletal: no joint pains or weakness	    Neurological: no weakness, numbness, or tingling      MEDICATIONS  (STANDING):  ammonium lactate 12% Lotion 1 Application(s) Topical two times a day  aspirin  chewable 81 milliGRAM(s) Oral daily  atorvastatin 80 milliGRAM(s) Oral at bedtime  chlorhexidine 4% Liquid 1 Application(s) Topical <User Schedule>  clopidogrel Tablet 75 milliGRAM(s) Oral daily  heparin   Injectable 5000 Unit(s) SubCutaneous every 8 hours  insulin glargine Injectable (LANTUS) 35 Unit(s) SubCutaneous at bedtime  insulin lispro (ADMELOG) corrective regimen sliding scale   SubCutaneous Before meals and at bedtime  metoprolol succinate ER 50 milliGRAM(s) Oral daily  nicotine -   7 mG/24Hr(s) Patch 1 Patch Transdermal daily  pantoprazole    Tablet 40 milliGRAM(s) Oral before breakfast  tamsulosin 0.4 milliGRAM(s) Oral at bedtime    MEDICATIONS  (PRN):  acetaminophen     Tablet .. 650 milliGRAM(s) Oral every 6 hours PRN Temp greater or equal to 38C (100.4F), Mild Pain (1 - 3)  acetaminophen     Tablet .. 650 milliGRAM(s) Oral every 6 hours PRN Mild Pain (1 - 3), Moderate Pain (4 - 6)  acetaminophen  Suppository .. 650 milliGRAM(s) Rectal once PRN Temp greater or equal to 38C (100.4F)  albuterol    90 MICROgram(s) HFA Inhaler 2 Puff(s) Inhalation three times a day PRN Shortness of Breath and/or Wheezing  dextrose 50% Injectable 25 Gram(s) IV Push every 15 minutes PRN blood glucose <70  diphenhydrAMINE 50 milliGRAM(s) Oral every 4 hours PRN Rash and/or Itching  hydrocortisone 1% Cream 1 Application(s) Topical every 6 hours PRN Rash and/or Itching  HYDROmorphone  Injectable 0.5 milliGRAM(s) IV Push every 6 hours PRN Severe Pain (7 - 10)      Allergies    ceftriaxone (Rash)  Zosyn (Pruritus)    Intolerances        SOCIAL HISTORY: denies drug use     FAMILY HISTORY:  Family history of COPD (chronic obstructive pulmonary disease) (Sibling)    Family history of cardiac disorder  Paternal        Vital Signs Last 24 Hrs  T(C): 36.6 (07 Mar 2023 13:00), Max: 36.9 (06 Mar 2023 20:00)  T(F): 97.9 (07 Mar 2023 13:00), Max: 98.4 (06 Mar 2023 20:00)  HR: 102 (07 Mar 2023 13:00) (99 - 107)  BP: 92/53 (07 Mar 2023 13:00) (91/50 - 112/70)  BP(mean): 67 (06 Mar 2023 20:00) (67 - 81)  RR: 18 (07 Mar 2023 13:00) (18 - 19)  SpO2: 93% (07 Mar 2023 13:00) (92% - 95%)    Parameters below as of 07 Mar 2023 13:00  Patient On (Oxygen Delivery Method): room air        PHYSICAL EXAM:    General: Well appearing, well nourished, in no apparent distress  HEENT: Normocephalic, thyroid not visibly enlarged, conjunctiva not injected, oropharynx clear without ulcerations  CV: No lower extremity edema, extremities warm and well perfused, +dorsalis pedis pulses  Resp: Non labored breathing, no clubbing of extremities  GI: Non distended abdomen, no palpable hepatosplenomegaly  Lymph: No visible lymphadenopathy  Neuro: Alert and oriented x3    Of note, on skin examination: Deep pink plaque with resolving bullae on the L lower extremity with lower extremity edema. Diffuse morbiliform eruption with confluence on the trunk and extremities      T(C): 36.6 (03-07-23 @ 13:00), Max: 36.9 (03-06-23 @ 20:00)  HR: 102 (03-07-23 @ 13:00) (99 - 107)  BP: 92/53 (03-07-23 @ 13:00) (91/50 - 112/70)  RR: 18 (03-07-23 @ 13:00) (18 - 19)  SpO2: 93% (03-07-23 @ 13:00) (92% - 95%)    LABS:                        13.2   17.54 )-----------( 170      ( 07 Mar 2023 06:50 )             43.0     03-07    128<L>  |  86<L>  |  120<H>  ----------------------------<  112<H>  3.6   |  21<L>  |  3.47<H>    Ca    9.4      07 Mar 2023 06:50  Phos  4.4     03-07  Mg     2.4     03-07    TPro  7.3  /  Alb  3.4  /  TBili  0.6  /  DBili  x   /  AST  32  /  ALT  44  /  AlkPhos  298<H>  03-07    PT/INR - ( 06 Mar 2023 00:38 )   PT: 15.8 sec;   INR: 1.36 ratio         PTT - ( 06 Mar 2023 00:38 )  PTT:32.3 sec        ADMINISTERED MEDS (ANTIMICROBIAL):   cefepime   IVPB: 100 IV Intermittent (03-01 – 03-03)   cefTRIAXone   IVPB: 100 IV Intermittent (03-04),  100 IV Intermittent (03-03)  meropenem  IVPB: 100 IV Intermittent (03-05 - 03-07)   vancomycin  IVPB: 250 IV Intermittent (03-01, 03-03, 03-05, 03-06, 03/07)    ADMINISTERED MEDS (NEUROLOGIC):   acetaminophen     Tablet ..: 975 Oral (03-01 - 03-07)   diphenhydrAMINE: 25 Oral (03-05 – 03-07)   HYDROmorphone  Injectable: 0.25 IV Push (03-04 – 03-07)   ondansetron Injectable: 4 IV Push (03-03)    ADMINISTERED MEDS (OTHER):   aluminum hydroxide/magnesium hydroxide/simethicone Suspension: 30 Oral (03-01)  ammonium lactate 12% Lotion: 1 Topical (03-07),  1 Topical (03-06)  aspirin  chewable: 81 Oral (03-07),  81 Oral (03-06),  81 Oral (03-05),  81 Oral (03-04),  81 Oral (03-03),  81 Oral (03-02)  atorvastatin: 80 Oral (03-06),  80 Oral (03-05),  80 Oral (03-04),  80 Oral (03-03),  80 Oral (03-02)  buMETAnide Infusion: 5 IV Continuous (03-05),  5 IV Continuous (03-05),  10 IV Continuous (03-04),  10 IV Continuous (03-04),  10 IV Continuous (03-04) (03-03)  chlorhexidine 4% Liquid: 1 Topical (03-07),  1 Topical (03-06),  1 Topical (03-05),  1 Topical (03-04),  1 Topical (03-03),  1 Topical (03-02)  clopidogrel Tablet: 75 Oral (03-07),  75 Oral (03-06),  75 Oral (03-05),  75 Oral (03-04),  75 Oral (03-03),  75 Oral (03-02)  enoxaparin Injectable: 40 SubCutaneous (03-02)  heparin subcutaneous (03-02 – 03-07)  hydrocortisone 2.5% Ointment: 1 Topical (03-06),  1 Topical (03-05)  insulin glargine and lantus since 3-02   metolazone: 10 Oral (03-04)  norepinephrine Infusion: 7 IV Continuous (03-01),  7 IV Continuous (03-01)  pantoprazole    Tablet: 40 Oral (03-02 – 03-07)

## 2023-03-07 NOTE — PROVIDER CONTACT NOTE (OTHER) - SITUATION
Pt complaining of generalized pain 4/10 unrelieved with Tylenol. BP is too soft and pain not severe enough for PRN Dilaudid

## 2023-03-07 NOTE — PROGRESS NOTE ADULT - ASSESSMENT
62M CAD s/p PCI, EF 19%, AICD, A1c 7.4%, COPD.   Here 3/1 with acute fevers and shock.   Improved after empiric Vanc and Cefepime but I don't know what we treated.   Blood, urine and sputum cultures negative and CT chest abdomen unrevealing.   Has a right shin ulcer but no obvious cellulitis.   I narrowed him to Ceftriaxone on 3/3 but over the weekend developed a pruritic rash over his chest and back.   If he tolerated Cefepime I don't think this is a true reaction to Ceftriaxone.   Also developed left leg erythema with bullae, ruptured. Suspect venous stasis rather than cellulitis or necrotization.   Overall picture doesn't seem to fit with a Staphylococcus scalded skin syndrome.     Suggest  -stop Meropenem and Vanc - overly broad, not clear that it's treating anything and may confound the clinical picture   -Dermatology input     Discussed with medicine     Sin Echeverria MD   Infectious Disease   Available on TEAMS. After 5PM and on weekends please page fellow on call or call [phone] 62M CAD s/p PCI, EF 19%, AICD, A1c 7.4%, COPD.   Here 3/1 with acute fevers and shock.   Improved after empiric Vanc and Cefepime but I don't know what we treated.   Blood, urine and sputum cultures negative and CT chest abdomen unrevealing.   Has a right shin ulcer but no obvious cellulitis.   I narrowed him to Ceftriaxone on 3/3 but over the weekend developed a pruritic rash over his chest and back.   If he tolerated Cefepime I don't think this is a true reaction to Ceftriaxone.   Also developed left leg erythema with bullae, ruptured. Suspect venous stasis rather than cellulitis or necrotization.   Overall picture doesn't seem to fit with a Staphylococcus scalded skin syndrome although nasal colonized with MSSA.     Suggest  -stop Meropenem and Vanc - overly broad, not clear that it's treating anything and may confound the clinical picture   -Dermatology input     Discussed with medicine     Sin Echeverria MD   Infectious Disease   Available on TEAMS. After 5PM and on weekends please page fellow on call or call [phone]

## 2023-03-07 NOTE — CONSULT NOTE ADULT - ATTENDING COMMENTS
The rash on the body is consistent with a drug exanthem (AKA morbilliform drug rash). Vancomycin is the most likely culprit based on the timing, though it is somewhat of a rapid onset. Do not see evidence of systemic hypersensitivity at this time, but will continue to monitor. May use triamcinolone prn itch.     The redness, swelling, and bulla formation on the left leg is most consistent with a resolving bullous cellulitis. Do not see signs of active infection on exam today. Antibiotics d/carlos as recommended by ID today. The edema may take several weeks to fully resolve. The bullae are going to leave behind erosions which may be difficult to heal.     Good wound care will promote healing. Wound well dressed today. Recommend placing vaseline at the base, continuing to use Adaptic over the Vaseline, covering with ABD pads, and then continuing to wrap with cotton gauze. Compression with an ACE bandage over cotton gauze wrap (as tolerated) may promote clearance of edema and increase wound healing.    Shayan Day MD, PharmD, MPH  Co-Director, Inpatient Dermatology Consultation Service, [facility]

## 2023-03-08 ENCOUNTER — APPOINTMENT (OUTPATIENT)
Dept: HEART FAILURE | Facility: CLINIC | Age: 63
End: 2023-03-08

## 2023-03-08 LAB
ALBUMIN SERPL ELPH-MCNC: 3.3 G/DL — SIGNIFICANT CHANGE UP (ref 3.3–5)
ALP SERPL-CCNC: 292 U/L — HIGH (ref 40–120)
ALT FLD-CCNC: 36 U/L — SIGNIFICANT CHANGE UP (ref 10–45)
ANION GAP SERPL CALC-SCNC: 19 MMOL/L — HIGH (ref 5–17)
APPEARANCE UR: CLEAR — SIGNIFICANT CHANGE UP
AST SERPL-CCNC: 31 U/L — SIGNIFICANT CHANGE UP (ref 10–40)
BACTERIA # UR AUTO: NEGATIVE — SIGNIFICANT CHANGE UP
BASOPHILS # BLD AUTO: 0.07 K/UL — SIGNIFICANT CHANGE UP (ref 0–0.2)
BASOPHILS NFR BLD AUTO: 0.5 % — SIGNIFICANT CHANGE UP (ref 0–2)
BILIRUB SERPL-MCNC: 0.5 MG/DL — SIGNIFICANT CHANGE UP (ref 0.2–1.2)
BILIRUB UR-MCNC: NEGATIVE — SIGNIFICANT CHANGE UP
BUN SERPL-MCNC: 139 MG/DL — HIGH (ref 7–23)
CALCIUM SERPL-MCNC: 9.3 MG/DL — SIGNIFICANT CHANGE UP (ref 8.4–10.5)
CHLORIDE SERPL-SCNC: 87 MMOL/L — LOW (ref 96–108)
CO2 SERPL-SCNC: 23 MMOL/L — SIGNIFICANT CHANGE UP (ref 22–31)
COLOR SPEC: SIGNIFICANT CHANGE UP
COMMENT - URINE: SIGNIFICANT CHANGE UP
CREAT ?TM UR-MCNC: 50 MG/DL — SIGNIFICANT CHANGE UP
CREAT SERPL-MCNC: 3.1 MG/DL — HIGH (ref 0.5–1.3)
CULTURE RESULTS: SIGNIFICANT CHANGE UP
DIFF PNL FLD: ABNORMAL
EGFR: 22 ML/MIN/1.73M2 — LOW
EOSINOPHIL # BLD AUTO: 0.74 K/UL — HIGH (ref 0–0.5)
EOSINOPHIL NFR BLD AUTO: 5 % — SIGNIFICANT CHANGE UP (ref 0–6)
EPI CELLS # UR: 0 /HPF — SIGNIFICANT CHANGE UP
GLUCOSE BLDC GLUCOMTR-MCNC: 180 MG/DL — HIGH (ref 70–99)
GLUCOSE BLDC GLUCOMTR-MCNC: 193 MG/DL — HIGH (ref 70–99)
GLUCOSE BLDC GLUCOMTR-MCNC: 225 MG/DL — HIGH (ref 70–99)
GLUCOSE BLDC GLUCOMTR-MCNC: 229 MG/DL — HIGH (ref 70–99)
GLUCOSE BLDC GLUCOMTR-MCNC: 253 MG/DL — HIGH (ref 70–99)
GLUCOSE SERPL-MCNC: 189 MG/DL — HIGH (ref 70–99)
GLUCOSE UR QL: NEGATIVE — SIGNIFICANT CHANGE UP
HCT VFR BLD CALC: 41.3 % — SIGNIFICANT CHANGE UP (ref 39–50)
HGB BLD-MCNC: 12.5 G/DL — LOW (ref 13–17)
HYALINE CASTS # UR AUTO: 7 /LPF — HIGH (ref 0–2)
IMM GRANULOCYTES NFR BLD AUTO: 5.3 % — HIGH (ref 0–0.9)
KETONES UR-MCNC: NEGATIVE — SIGNIFICANT CHANGE UP
LEUKOCYTE ESTERASE UR-ACNC: NEGATIVE — SIGNIFICANT CHANGE UP
LYMPHOCYTES # BLD AUTO: 0.92 K/UL — LOW (ref 1–3.3)
LYMPHOCYTES # BLD AUTO: 6.2 % — LOW (ref 13–44)
MCHC RBC-ENTMCNC: 22.9 PG — LOW (ref 27–34)
MCHC RBC-ENTMCNC: 30.3 GM/DL — LOW (ref 32–36)
MCV RBC AUTO: 75.6 FL — LOW (ref 80–100)
MONOCYTES # BLD AUTO: 1.03 K/UL — HIGH (ref 0–0.9)
MONOCYTES NFR BLD AUTO: 6.9 % — SIGNIFICANT CHANGE UP (ref 2–14)
NEUTROPHILS # BLD AUTO: 11.38 K/UL — HIGH (ref 1.8–7.4)
NEUTROPHILS NFR BLD AUTO: 76.1 % — SIGNIFICANT CHANGE UP (ref 43–77)
NITRITE UR-MCNC: NEGATIVE — SIGNIFICANT CHANGE UP
NRBC # BLD: 0 /100 WBCS — SIGNIFICANT CHANGE UP (ref 0–0)
OSMOLALITY SERPL: 324 MOSMOL/KG — HIGH (ref 280–301)
OSMOLALITY UR: 346 MOS/KG — SIGNIFICANT CHANGE UP (ref 300–900)
PH UR: 6 — SIGNIFICANT CHANGE UP (ref 5–8)
PLATELET # BLD AUTO: 203 K/UL — SIGNIFICANT CHANGE UP (ref 150–400)
POTASSIUM SERPL-MCNC: 3.6 MMOL/L — SIGNIFICANT CHANGE UP (ref 3.5–5.3)
POTASSIUM SERPL-SCNC: 3.6 MMOL/L — SIGNIFICANT CHANGE UP (ref 3.5–5.3)
PROT SERPL-MCNC: 7.2 G/DL — SIGNIFICANT CHANGE UP (ref 6–8.3)
PROT UR-MCNC: ABNORMAL
RBC # BLD: 5.46 M/UL — SIGNIFICANT CHANGE UP (ref 4.2–5.8)
RBC # FLD: 18.9 % — HIGH (ref 10.3–14.5)
RBC CASTS # UR COMP ASSIST: 17 /HPF — HIGH (ref 0–4)
SODIUM SERPL-SCNC: 129 MMOL/L — LOW (ref 135–145)
SODIUM UR-SCNC: 67 MMOL/L — SIGNIFICANT CHANGE UP
SP GR SPEC: 1.01 — SIGNIFICANT CHANGE UP (ref 1.01–1.02)
SPECIMEN SOURCE: SIGNIFICANT CHANGE UP
UROBILINOGEN FLD QL: NEGATIVE — SIGNIFICANT CHANGE UP
UUN UR-MCNC: 433 MG/DL — SIGNIFICANT CHANGE UP
WBC # BLD: 14.93 K/UL — HIGH (ref 3.8–10.5)
WBC # FLD AUTO: 14.93 K/UL — HIGH (ref 3.8–10.5)
WBC UR QL: 4 /HPF — SIGNIFICANT CHANGE UP (ref 0–5)

## 2023-03-08 PROCEDURE — 97598 DBRDMT OPN WND ADDL 20CM/<: CPT

## 2023-03-08 PROCEDURE — 99222 1ST HOSP IP/OBS MODERATE 55: CPT | Mod: 25

## 2023-03-08 PROCEDURE — 97597 DBRDMT OPN WND 1ST 20 CM/<: CPT

## 2023-03-08 PROCEDURE — 99233 SBSQ HOSP IP/OBS HIGH 50: CPT

## 2023-03-08 RX ORDER — ALPRAZOLAM 0.25 MG
0.25 TABLET ORAL
Refills: 0 | Status: DISCONTINUED | OUTPATIENT
Start: 2023-03-08 | End: 2023-03-11

## 2023-03-08 RX ADMIN — Medication 1 APPLICATION(S): at 18:15

## 2023-03-08 RX ADMIN — Medication 1 APPLICATION(S): at 05:27

## 2023-03-08 RX ADMIN — CLOPIDOGREL BISULFATE 75 MILLIGRAM(S): 75 TABLET, FILM COATED ORAL at 12:02

## 2023-03-08 RX ADMIN — Medication 4: at 11:55

## 2023-03-08 RX ADMIN — HEPARIN SODIUM 5000 UNIT(S): 5000 INJECTION INTRAVENOUS; SUBCUTANEOUS at 08:18

## 2023-03-08 RX ADMIN — Medication 2: at 17:43

## 2023-03-08 RX ADMIN — Medication 81 MILLIGRAM(S): at 12:02

## 2023-03-08 RX ADMIN — Medication 1 APPLICATION(S): at 05:28

## 2023-03-08 RX ADMIN — INSULIN GLARGINE 35 UNIT(S): 100 INJECTION, SOLUTION SUBCUTANEOUS at 23:06

## 2023-03-08 RX ADMIN — HEPARIN SODIUM 5000 UNIT(S): 5000 INJECTION INTRAVENOUS; SUBCUTANEOUS at 23:07

## 2023-03-08 RX ADMIN — Medication 2: at 08:15

## 2023-03-08 RX ADMIN — CHLORHEXIDINE GLUCONATE 1 APPLICATION(S): 213 SOLUTION TOPICAL at 05:28

## 2023-03-08 RX ADMIN — HEPARIN SODIUM 5000 UNIT(S): 5000 INJECTION INTRAVENOUS; SUBCUTANEOUS at 16:30

## 2023-03-08 RX ADMIN — PANTOPRAZOLE SODIUM 40 MILLIGRAM(S): 20 TABLET, DELAYED RELEASE ORAL at 05:27

## 2023-03-08 RX ADMIN — Medication 1 PATCH: at 12:40

## 2023-03-08 RX ADMIN — TAMSULOSIN HYDROCHLORIDE 0.4 MILLIGRAM(S): 0.4 CAPSULE ORAL at 23:06

## 2023-03-08 RX ADMIN — ATORVASTATIN CALCIUM 80 MILLIGRAM(S): 80 TABLET, FILM COATED ORAL at 23:06

## 2023-03-08 RX ADMIN — Medication 6: at 23:07

## 2023-03-08 RX ADMIN — Medication 1 PATCH: at 19:30

## 2023-03-08 RX ADMIN — Medication 50 MILLIGRAM(S): at 05:27

## 2023-03-08 RX ADMIN — Medication 1 PATCH: at 06:52

## 2023-03-08 NOTE — PROGRESS NOTE ADULT - ASSESSMENT
62 year old male with pmh of HFrEF with an EF of 19% status post AICD, CAD status post stents, DM2, HTN, COPD with a 30+ pack year history of smoking presents to the ED with shortness of breath.       1- MANJIT on CKD i,e ATN   2- sepsis  3- CHF  4- DM2  5- chronic R hydro  6- Left leg cellulitis  7- rash     MANJIT on ckd III in setting of sepsis/fevers/hypotension   has hx of cardiomyopathy with ef < 20 %    suspect possible cephalosporin rash on thorax   on meropenem now  cont    cr is still high  but slowly improving   hyponatremia is improving   holding diuretics today again  however he will need them shortly   cont O2   insulin s-s-   strict I/O  pan culture  NGTD   d/w pt wife  at bedside

## 2023-03-08 NOTE — PROGRESS NOTE ADULT - SUBJECTIVE AND OBJECTIVE BOX
[facility]   [phone]  RENAL FOLLOW UP NOTE  --------------------------------------------------------------------------------  Chief Complaint:    24 hour events/subjective:    seen earlier  states feels better     PAST HISTORY  --------------------------------------------------------------------------------  No significant changes to PMH, PSH, FHx, SHx, unless otherwise noted    ALLERGIES & MEDICATIONS  --------------------------------------------------------------------------------  Allergies    ceftriaxone (Rash)  Zosyn (Pruritus)    Intolerances      Standing Inpatient Medications  ammonium lactate 12% Lotion 1 Application(s) Topical two times a day  aspirin  chewable 81 milliGRAM(s) Oral daily  atorvastatin 80 milliGRAM(s) Oral at bedtime  chlorhexidine 4% Liquid 1 Application(s) Topical <User Schedule>  clopidogrel Tablet 75 milliGRAM(s) Oral daily  heparin   Injectable 5000 Unit(s) SubCutaneous every 8 hours  insulin glargine Injectable (LANTUS) 35 Unit(s) SubCutaneous at bedtime  insulin lispro (ADMELOG) corrective regimen sliding scale   SubCutaneous Before meals and at bedtime  metoprolol succinate ER 50 milliGRAM(s) Oral daily  nicotine -   7 mG/24Hr(s) Patch 1 Patch Transdermal daily  pantoprazole    Tablet 40 milliGRAM(s) Oral before breakfast  tamsulosin 0.4 milliGRAM(s) Oral at bedtime  triamcinolone 0.1% Cream 1 Application(s) Topical every 12 hours    PRN Inpatient Medications  acetaminophen     Tablet .. 650 milliGRAM(s) Oral every 6 hours PRN  acetaminophen     Tablet .. 650 milliGRAM(s) Oral every 6 hours PRN  acetaminophen  Suppository .. 650 milliGRAM(s) Rectal once PRN  albuterol    90 MICROgram(s) HFA Inhaler 2 Puff(s) Inhalation three times a day PRN  ALPRAZolam 0.25 milliGRAM(s) Oral two times a day PRN  dextrose 50% Injectable 25 Gram(s) IV Push every 15 minutes PRN  diphenhydrAMINE 50 milliGRAM(s) Oral every 4 hours PRN      REVIEW OF SYSTEMS  --------------------------------------------------------------------------------    Gen: denies  fevers/chills,  CVS: denies chest pain/palpitations  Resp: denies SOB/Cough  GI: Denies N/V/Abd pain  : Denies dysuria    VITALS/PHYSICAL EXAM  --------------------------------------------------------------------------------  T(C): 36.2 (03-08-23 @ 15:07), Max: 36.8 (03-07-23 @ 23:48)  HR: 86 (03-08-23 @ 15:07) (86 - 104)  BP: 98/62 (03-08-23 @ 15:07) (86/54 - 109/64)  RR: 18 (03-08-23 @ 15:07) (18 - 19)  SpO2: 97% (03-08-23 @ 15:07) (92% - 97%)  Wt(kg): --        03-07-23 @ 07:01  -  03-08-23 @ 07:00  --------------------------------------------------------  IN: 250 mL / OUT: 3500 mL / NET: -3250 mL    03-08-23 @ 07:01  -  03-08-23 @ 22:35  --------------------------------------------------------  IN: 0 mL / OUT: 300 mL / NET: -300 mL      Physical Exam:  	    Gen:  on RA   	Pulm: decrease bs  no rales or ronchi or wheezing  	CV: no JVD. RRR, S1S2; no rub  	Abd: +BS, soft, nontender/nondistended, obese  	UE: Warm, no cyanosis  no clubbing,  no edema;   	LE: Warm, no cyanosis  no clubbing, no edema, RLE erhythema + skin ulcer present on admission as well   	Neuro: alert and oriented.  	Skin:  + rash macular back thoracic and left leg area. Left leg is significantly  erythematous and with left leg edema weeping at ankle left       LABS/STUDIES  --------------------------------------------------------------------------------              12.5   14.93 >-----------<  203      [03-08-23 @ 06:34]              41.3     129  |  87  |  139  ----------------------------<  189      [03-08-23 @ 06:34]  3.6   |  23  |  3.10        Ca     9.3     [03-08-23 @ 06:34]      Mg     2.4     [03-07-23 @ 06:50]      Phos  4.4     [03-07-23 @ 06:50]    TPro  7.2  /  Alb  3.3  /  TBili  0.5  /  DBili  x   /  AST  31  /  ALT  36  /  AlkPhos  292  [03-08-23 @ 06:34]              [03-07-23 @ 06:50]  Serum Osmolality 324      [03-08-23 @ 06:34]    Creatinine Trend:  SCr 3.10 [03-08 @ 06:34]  SCr 3.47 [03-07 @ 06:50]  SCr 3.27 [03-06 @ 17:26]  SCr 3.35 [03-06 @ 07:38]  SCr 3.53 [03-06 @ 00:38]              Urinalysis - [03-08-23 @ 06:39]      Color Light Yellow / Appearance Clear / SG 1.012 / pH 6.0      Gluc Negative / Ketone Negative  / Bili Negative / Urobili Negative       Blood Trace / Protein 30 mg/dL / Leuk Est Negative / Nitrite Negative      RBC 17 / WBC 4 / Hyaline 7 / Gran  / Sq Epi  / Non Sq Epi 0 / Bacteria Negative    Urine Creatinine 50      [03-08-23 @ 06:40]  Urine Sodium 67      [03-08-23 @ 06:40]  Urine Urea Nitrogen 433      [03-08-23 @ 06:40]  Urine Osmolality 346      [03-08-23 @ 06:40]    Iron 14, TIBC --, %sat --      [03-02-23 @ 01:20]  Ferritin 225      [03-02-23 @ 01:18]  PTH -- (Ca 8.9)      [10-02-22 @ 07:04]   73  HbA1c 8.9      [12-16-19 @ 08:15]  TSH 3.02      [09-30-22 @ 13:45]

## 2023-03-08 NOTE — CONSULT NOTE ADULT - CONSULT REQUESTED DATE/TIME
02-Mar-2023 11:57
03-Mar-2023
09-Mar-2023 05:41
01-Mar-2023 22:35
02-Mar-2023 09:08
07-Mar-2023 14:53

## 2023-03-08 NOTE — CONSULT NOTE ADULT - SUBJECTIVE AND OBJECTIVE BOX
Wound SURGERY CONSULT NOTE    HPI:  62 year old male with pmh of HFrEF with an EF of 25% status post AICD, CAD status post stents, DM 2, HTN, COPD with a 30+ pack year history of smoking presents to the ED with shortness of breath, vomiting, diarrhea, hypoxia to mid 80s without NC --> 99% on 1L NC, hypotension requiring pressors in setting of fever admitted to MICU for septic shock. Patient was continued on cefepime and vancomycin. Was weaned off levophed 3/3. CT CAP without PNA, small pleural effusion, no infectious focus in abdomen. CT with hydronephrosis, worsened from previous CT 2022 but no obstructing stone. U/A and blood cultures negative. Cellulitis LLE newly found on 3/4 and patient was de-escalated to ceftriaxone. ID following. CT LE with diffuse subcutaneous edema and cellulitis, CK level elevated > 300. Pt developed a new blanching diffuse rash and worsening cellulitis 3/5 possible 2/2 to CTX, switched to vanc/hetal.   MICU stay complicated by worsening renal function (Cr 4.8) and decreasing urine output likely i/s/o cardiorenal. POCUS with volume overload, patient was started on bumex gtt with metolazone with improving urine output. Bumex gtt discontinued 3/6.     Wound consult requested by team to assist w/ management of LLE wound.   Pt c/o minimal pain, & clear drainage, No odor, improving color change and swelling. Offloading and pericare initiated  Pt denies new H/o falls, trauma. Pt noted w/ scratcher device- but promises uses on back. Pt  has chronic Lt foot 3rd &5th digit toe wounds that he follows w/ Dr Sigala at [facility].  Pt developed erythematous rash- Derm Consulted. Appetite good w/o weight loss.  All questions asked and answered to pt's expressed understanding and satisfaction.    Current Diet: Diet, Regular:   Consistent Carbohydrate Evening Snack (CSTCHOSN)  DASH/TLC Sodium & Cholesterol Restricted (DASH) (03-02-23 @ 03:06)      PAST MEDICAL & SURGICAL HISTORY:  s/p Stented coronary artery    Diabetes    AICD (automatic cardioverter/defibrillator) present    Hypertension    Heart failure with reduced ejection fraction    ischemic cardiomyopathy    COPD    gastroesophageal reflux (GERD)    s/p Vasectomy    BLE PVD    s/p Lt foot w/  multiple toe amputated    REVIEW OF SYSTEMS: General/ Skin/ VAsc / MSK: see HPI  All other systems negative    MEDICATIONS  (STANDING):  ammonium lactate 12% Lotion 1 Application(s) Topical two times a day  aspirin  chewable 81 milliGRAM(s) Oral daily  atorvastatin 80 milliGRAM(s) Oral at bedtime  chlorhexidine 4% Liquid 1 Application(s) Topical <User Schedule>  clopidogrel Tablet 75 milliGRAM(s) Oral daily  heparin   Injectable 5000 Unit(s) SubCutaneous every 8 hours  insulin glargine Injectable (LANTUS) 35 Unit(s) SubCutaneous at bedtime  insulin lispro (ADMELOG) corrective regimen sliding scale   SubCutaneous Before meals and at bedtime  metoprolol succinate ER 50 milliGRAM(s) Oral daily  nicotine -   7 mG/24Hr(s) Patch 1 Patch Transdermal daily  pantoprazole    Tablet 40 milliGRAM(s) Oral before breakfast  tamsulosin 0.4 milliGRAM(s) Oral at bedtime  triamcinolone 0.1% Cream 1 Application(s) Topical every 12 hours    MEDICATIONS  (PRN):  acetaminophen     Tablet .. 650 milliGRAM(s) Oral every 6 hours PRN Temp greater or equal to 38C (100.4F), Mild Pain (1 - 3)  acetaminophen     Tablet .. 650 milliGRAM(s) Oral every 6 hours PRN Mild Pain (1 - 3), Moderate Pain (4 - 6)  acetaminophen  Suppository .. 650 milliGRAM(s) Rectal once PRN Temp greater or equal to 38C (100.4F)  albuterol    90 MICROgram(s) HFA Inhaler 2 Puff(s) Inhalation three times a day PRN Shortness of Breath and/or Wheezing  ALPRAZolam 0.25 milliGRAM(s) Oral two times a day PRN anxiety  dextrose 50% Injectable 25 Gram(s) IV Push every 15 minutes PRN blood glucose <70  diphenhydrAMINE 50 milliGRAM(s) Oral every 4 hours PRN Rash and/or Itching      Allergies  ceftriaxone (Rash)  Zosyn (Pruritus)      SOCIAL HISTORY:  ; Former smoker, Denies current smoking, ETOH, drugs    FAMILY HISTORY: no h/o PVD or wound healing or skin problems  Family history of COPD (chronic obstructive pulmonary disease) (Sibling) and cardiac disorder (Paternal)         PHYSICAL EXAM:  Vital Signs Last 24 Hrs  T(C): 36.6 (08 Mar 2023 08:28), Max: 36.8 (07 Mar 2023 23:48)  T(F): 97.8 (08 Mar 2023 08:28), Max: 98.2 (07 Mar 2023 23:48)  HR: 96 (08 Mar 2023 08:28) (96 - 104)  BP: 97/59 (08 Mar 2023 11:27) (86/54 - 116/63)  BP(mean): --  RR: 18 (08 Mar 2023 08:28) (18 - 19)  SpO2: 95% (08 Mar 2023 08:28) (92% - 95%)    Parameters below as of 08 Mar 2023 08:28  Patient On (Oxygen Delivery Method): room air      NAD,  A&Ox3, Obese  WD/ WN/ WG  Versa Care P500 bed  HEENT:  NC/AT, EOMI, sclera clear, mucosa moist, throat clear, trachea midline, neck supple  Respiratory: nonlabored w/ equal chest rise  Gastrointestinal soft NT/ND   Neurology:  strength  grossly intact, paraesthesia   Psych: calm/ appropriate  Musculoskeletal:  FROM, no contractures  Vascular: BLE equally warm/ cool,  no cyanosis, clubbing, no acute ischemia        L>RLE edema       BLE DP/PT pulses palpable       BLE hemosiderin staining      LLE ruptured bullae w/ serous drainage      LLE 3rd and 5th toe ulcers w/o drainage  Procedure Note  Using aseptic technique LLE wound was limited selective excisional debridement using scissor and forceps through nonviavble skin.  Pt tolerated procedure well.  Hemostasis was maintained throughout.  Debulking debridement of nonviable tissue.  measurements same    No odor, erythema, increased warmth, tenderness, induration, fluctuance, nor crepitus  Skin:  moist w/ good turgor      LABS/ CULTURES/ RADIOLOGY:                        12.5   14.93 )-----------( 203      ( 08 Mar 2023 06:34 )             41.3       129  |  87  |  139  ----------------------------<  189      [03-08-23 @ 06:34]  3.6   |  23  |  3.10        Ca     9.3     [03-08-23 @ 06:34]      Mg     2.4     [03-07-23 @ 06:50]      Phos  4.4     [03-07-23 @ 06:50]    TPro  7.2  /  Alb  3.3  /  TBili  0.5  /  DBili  x   /  AST  31  /  ALT  36  /  AlkPhos  292  [03-08-23 @ 06:34]      < from: CT Lower Extremity No Cont, Bilateral (03.05.23 @ 12:29) >  ACC: [id number] EXAM:  CT LWR EXT BI   ORDERED BY: AIN ENCISO     PROCEDURE DATE:  03/05/2023          INTERPRETATION:  HISTORY: Bilateral lower extremity pain in the shins and   feet.    Helical CT imaging of the bilateral lower extremities from the level of   the knees to the level of the was performed without intravenous contrast.   Sagittal and coronal reformats were provided.    Findings:    Right lower extremity: There is circumferential subcutaneous edema   extending from the proximaldiaphyseal region of the tibia/fibula to the   level of the foot. Edema appears confined to the subcutaneous fat without   definite reticulation or stranding within the myofascial planes. There is   no subcutaneous air.    There is no evidence of acute fracture or dislocation. There is no   osseous erosion or destruction to suggest osteomyelitis. Visualized joint   spaces are preserved. There is no knee joint effusion. There is   atherosclerotic disease.    Left lower extremity: There is circumferential subcutaneous edema   extending from the level of the proximal tibial/fibular diaphysis to the   level of the foot. Edema appears confined to the subcutaneous fat without   definite reticulation of the deep myofascial planes. There is no   subcutaneous air. Scattered foci of soft tissue mineralization within the   medial and anterior subcutaneous fat at the level of the distal tibial   diaphysis which is likely chronic in nature.    There is no evidence of acute fracture or dislocation. There is no   osseous erosion or destruction is status osteomyelitis. Visualized joint   spaces are preserved. There is no joint effusion.    IMPRESSION:    Right lower extremity: Findings suggestive of cellulitis with extensive   circumferential subcutaneous edema about the tibia and fibula extending   to the foot. No subcutaneous air. No CT evidence of osteomyelitis.    Left lower extremity: Findings suggestive of cellulitis with extensive   circumferential subcutaneous edema about the tibia and fibula extending   to the foot. No subcutaneous air. No CT evidence of osteomyelitis.    < end of copied text >      ACC: [id number] EXAM:  DUPLEX SCAN EXT VEINS LOWER BI   ORDERED BY: IAN ENCISO     PROCEDURE DATE:  03/03/2023          INTERPRETATION:  CLINICAL INFORMATION: Fever and lower extremity edema.    COMPARISON: Left lower extremity duplex of 1/17/2022.    TECHNIQUE: Duplex sonography of the BILATERAL LOWER extremity veins with   color and spectral Doppler, with and without compression.    FINDINGS:    RIGHT:  Normal compressibility of the RIGHT common femoral, femoral and popliteal   veins.  Doppler examination shows normal spontaneous and phasic flow.  No RIGHT calf vein thrombosis is detected.    LEFT:  Normal compressibility of the LEFT common femoral, femoral and popliteal   veins.  Doppler examination shows normal spontaneous and phasic flow.  No LEFT calf vein thrombosis is detected.    Nonspecific prominent left groin node measuring up to 1.8 cm in short   axis.    IMPRESSION:  No evidence of deep venous thrombosis in either lower extremity.             Wound SURGERY CONSULT NOTE    HPI:  62 year old male with pmh of HFrEF with an EF of 25% status post AICD, CAD status post stents, DM 2, HTN, COPD with a 30+ pack year history of smoking presents to the ED with shortness of breath, vomiting, diarrhea, hypoxia to mid 80s without NC --> 99% on 1L NC, hypotension requiring pressors in setting of fever admitted to MICU for septic shock. Patient was continued on cefepime and vancomycin. Was weaned off levophed 3/3. CT CAP without PNA, small pleural effusion, no infectious focus in abdomen. CT with hydronephrosis, worsened from previous CT 2022 but no obstructing stone. U/A and blood cultures negative. Cellulitis LLE newly found on 3/4 and patient was de-escalated to ceftriaxone. ID following. CT LE with diffuse subcutaneous edema and cellulitis, CK level elevated > 300. Pt developed a new blanching diffuse rash and worsening cellulitis 3/5 possible 2/2 to CTX, switched to vanc/hetal.   MICU stay complicated by worsening renal function (Cr 4.8) and decreasing urine output likely i/s/o cardiorenal. POCUS with volume overload, patient was started on bumex gtt with metolazone with improving urine output. Bumex gtt discontinued 3/6.     Wound consult requested by team to assist w/ management of LLE wound.   Pt c/o minimal pain, & clear drainage, No odor, improving color change and swelling. Offloading and pericare initiated  Pt denies new H/o falls, trauma. Pt noted w/ scratcher device- but promises uses on back. Pt  has chronic Lt foot 3rd &5th digit toe wounds that he follows w/ Dr Sigala at [facility].  Pt developed erythematous rash- Derm Consulted. Appetite good w/o weight loss.  All questions asked and answered to pt's expressed understanding and satisfaction.    Current Diet: Diet, Regular:   Consistent Carbohydrate Evening Snack (CSTCHOSN)  DASH/TLC Sodium & Cholesterol Restricted (DASH) (03-02-23 @ 03:06)      PAST MEDICAL & SURGICAL HISTORY:  s/p Stented coronary artery    Diabetes    AICD (automatic cardioverter/defibrillator) present    Hypertension    Heart failure with reduced ejection fraction    ischemic cardiomyopathy    COPD    gastroesophageal reflux (GERD)    s/p Vasectomy    BLE PVD    s/p Lt foot w/  multiple toe amputated    REVIEW OF SYSTEMS: General/ Skin/ VAsc / MSK: see HPI  All other systems negative    MEDICATIONS  (STANDING):  ammonium lactate 12% Lotion 1 Application(s) Topical two times a day  aspirin  chewable 81 milliGRAM(s) Oral daily  atorvastatin 80 milliGRAM(s) Oral at bedtime  chlorhexidine 4% Liquid 1 Application(s) Topical <User Schedule>  clopidogrel Tablet 75 milliGRAM(s) Oral daily  heparin   Injectable 5000 Unit(s) SubCutaneous every 8 hours  insulin glargine Injectable (LANTUS) 35 Unit(s) SubCutaneous at bedtime  insulin lispro (ADMELOG) corrective regimen sliding scale   SubCutaneous Before meals and at bedtime  metoprolol succinate ER 50 milliGRAM(s) Oral daily  nicotine -   7 mG/24Hr(s) Patch 1 Patch Transdermal daily  pantoprazole    Tablet 40 milliGRAM(s) Oral before breakfast  tamsulosin 0.4 milliGRAM(s) Oral at bedtime  triamcinolone 0.1% Cream 1 Application(s) Topical every 12 hours    MEDICATIONS  (PRN):  acetaminophen     Tablet .. 650 milliGRAM(s) Oral every 6 hours PRN Temp greater or equal to 38C (100.4F), Mild Pain (1 - 3)  acetaminophen     Tablet .. 650 milliGRAM(s) Oral every 6 hours PRN Mild Pain (1 - 3), Moderate Pain (4 - 6)  acetaminophen  Suppository .. 650 milliGRAM(s) Rectal once PRN Temp greater or equal to 38C (100.4F)  albuterol    90 MICROgram(s) HFA Inhaler 2 Puff(s) Inhalation three times a day PRN Shortness of Breath and/or Wheezing  ALPRAZolam 0.25 milliGRAM(s) Oral two times a day PRN anxiety  dextrose 50% Injectable 25 Gram(s) IV Push every 15 minutes PRN blood glucose <70  diphenhydrAMINE 50 milliGRAM(s) Oral every 4 hours PRN Rash and/or Itching      Allergies  ceftriaxone (Rash)  Zosyn (Pruritus)      SOCIAL HISTORY:  ; Former smoker, Denies current smoking, ETOH, drugs    FAMILY HISTORY: no h/o PVD or wound healing or skin problems  Family history of COPD (chronic obstructive pulmonary disease) (Sibling) and cardiac disorder (Paternal)         PHYSICAL EXAM:  Vital Signs Last 24 Hrs  T(C): 36.6 (08 Mar 2023 08:28), Max: 36.8 (07 Mar 2023 23:48)  T(F): 97.8 (08 Mar 2023 08:28), Max: 98.2 (07 Mar 2023 23:48)  HR: 96 (08 Mar 2023 08:28) (96 - 104)  BP: 97/59 (08 Mar 2023 11:27) (86/54 - 116/63)  BP(mean): --  RR: 18 (08 Mar 2023 08:28) (18 - 19)  SpO2: 95% (08 Mar 2023 08:28) (92% - 95%)    Parameters below as of 08 Mar 2023 08:28  Patient On (Oxygen Delivery Method): room air      NAD,  A&Ox3, Obese  WD/ WN/ WG  Versa Care P500 bed  HEENT:  NC/AT, EOMI, sclera clear, mucosa moist, throat clear, trachea midline, neck supple  Respiratory: nonlabored w/ equal chest rise  Gastrointestinal soft NT/ND   Neurology:  strength  grossly intact, paraesthesia   Psych: calm/ appropriate  Musculoskeletal:  FROM, no contractures  Vascular: BLE equally warm/ cool,  no cyanosis, clubbing, no acute ischemia        L>RLE edema       BLE DP/PT pulses palpable       BLE hemosiderin staining      LLE ruptured bullae w/ serous drainage          20cmx 13cm x 0cm      LLE 3rd and 5th toe ulcers w/o drainage      RLE dry eschar 1.5cm x 1.5cm x 0cm   Procedure Note  Using aseptic technique LLE wound was limited selective excisional debridement using scissor and forceps through nonviavble skin.  Pt tolerated procedure well.  Hemostasis was maintained throughout.  Debulking debridement of nonviable tissue.  measurements same  20cmx 13cm x 0cm    No odor, erythema, increased warmth, tenderness, induration, fluctuance, nor crepitus  Skin:  moist w/ good turgor      LABS/ CULTURES/ RADIOLOGY:                        12.5   14.93 )-----------( 203      ( 08 Mar 2023 06:34 )             41.3       129  |  87  |  139  ----------------------------<  189      [03-08-23 @ 06:34]  3.6   |  23  |  3.10        Ca     9.3     [03-08-23 @ 06:34]      Mg     2.4     [03-07-23 @ 06:50]      Phos  4.4     [03-07-23 @ 06:50]    TPro  7.2  /  Alb  3.3  /  TBili  0.5  /  DBili  x   /  AST  31  /  ALT  36  /  AlkPhos  292  [03-08-23 @ 06:34]      < from: CT Lower Extremity No Cont, Bilateral (03.05.23 @ 12:29) >  ACC: [id number] EXAM:  CT LWR EXT BI   ORDERED BY: IAN ENCISO     PROCEDURE DATE:  03/05/2023          INTERPRETATION:  HISTORY: Bilateral lower extremity pain in the shins and   feet.    Helical CT imaging of the bilateral lower extremities from the level of   the knees to the level of the was performed without intravenous contrast.   Sagittal and coronal reformats were provided.    Findings:    Right lower extremity: There is circumferential subcutaneous edema   extending from the proximaldiaphyseal region of the tibia/fibula to the   level of the foot. Edema appears confined to the subcutaneous fat without   definite reticulation or stranding within the myofascial planes. There is   no subcutaneous air.    There is no evidence of acute fracture or dislocation. There is no   osseous erosion or destruction to suggest osteomyelitis. Visualized joint   spaces are preserved. There is no knee joint effusion. There is   atherosclerotic disease.    Left lower extremity: There is circumferential subcutaneous edema   extending from the level of the proximal tibial/fibular diaphysis to the   level of the foot. Edema appears confined to the subcutaneous fat without   definite reticulation of the deep myofascial planes. There is no   subcutaneous air. Scattered foci of soft tissue mineralization within the   medial and anterior subcutaneous fat at the level of the distal tibial   diaphysis which is likely chronic in nature.    There is no evidence of acute fracture or dislocation. There is no   osseous erosion or destruction is status osteomyelitis. Visualized joint   spaces are preserved. There is no joint effusion.    IMPRESSION:    Right lower extremity: Findings suggestive of cellulitis with extensive   circumferential subcutaneous edema about the tibia and fibula extending   to the foot. No subcutaneous air. No CT evidence of osteomyelitis.    Left lower extremity: Findings suggestive of cellulitis with extensive   circumferential subcutaneous edema about the tibia and fibula extending   to the foot. No subcutaneous air. No CT evidence of osteomyelitis.    < end of copied text >      ACC: [id number] EXAM:  DUPLEX SCAN EXT VEINS LOWER BI   ORDERED BY: IAN ENCISO     PROCEDURE DATE:  03/03/2023          INTERPRETATION:  CLINICAL INFORMATION: Fever and lower extremity edema.    COMPARISON: Left lower extremity duplex of 1/17/2022.    TECHNIQUE: Duplex sonography of the BILATERAL LOWER extremity veins with   color and spectral Doppler, with and without compression.    FINDINGS:    RIGHT:  Normal compressibility of the RIGHT common femoral, femoral and popliteal   veins.  Doppler examination shows normal spontaneous and phasic flow.  No RIGHT calf vein thrombosis is detected.    LEFT:  Normal compressibility of the LEFT common femoral, femoral and popliteal   veins.  Doppler examination shows normal spontaneous and phasic flow.  No LEFT calf vein thrombosis is detected.    Nonspecific prominent left groin node measuring up to 1.8 cm in short   axis.    IMPRESSION:  No evidence of deep venous thrombosis in either lower extremity.             Wound SURGERY CONSULT NOTE    HPI:  62 year old male with pmh of HFrEF with an EF of 25% status post AICD, CAD status post stents, DM 2, HTN, COPD with a 30+ pack year history of smoking presents to the ED with shortness of breath, vomiting, diarrhea, hypoxia to mid 80s without NC --> 99% on 1L NC, hypotension requiring pressors in setting of fever admitted to MICU for septic shock. Patient was continued on cefepime and vancomycin. Was weaned off levophed 3/3. CT CAP without PNA, small pleural effusion, no infectious focus in abdomen. CT with hydronephrosis, worsened from previous CT 2022 but no obstructing stone. U/A and blood cultures negative. Cellulitis LLE newly found on 3/4 and patient was de-escalated to ceftriaxone. ID following. CT LE with diffuse subcutaneous edema and cellulitis, CK level elevated > 300. Pt developed a new blanching diffuse rash and worsening cellulitis 3/5 possible 2/2 to CTX, switched to vanc/hetal.   MICU stay complicated by worsening renal function (Cr 4.8) and decreasing urine output likely i/s/o cardiorenal. POCUS with volume overload, patient was started on bumex gtt with metolazone with improving urine output. Bumex gtt discontinued 3/6.     Wound consult requested by team to assist w/ management of LLE wound.   Pt c/o minimal pain, & clear drainage, No odor, improving color change and swelling. Offloading and pericare initiated  Pt denies new H/o falls, trauma. Pt noted w/ scratcher device- but promises uses on back. Pt  has chronic Lt foot 3rd &5th digit toe wounds that he follows w/ Dr Sigala at [facility].  Pt developed erythematous rash- Derm Consulted. Appetite good w/o weight loss.  All questions asked and answered to pt's expressed understanding and satisfaction.    Current Diet: Diet, Regular:   Consistent Carbohydrate Evening Snack (CSTCHOSN)  DASH/TLC Sodium & Cholesterol Restricted (DASH) (03-02-23 @ 03:06)      PAST MEDICAL & SURGICAL HISTORY:  s/p Stented coronary artery    Diabetes    AICD (automatic cardioverter/defibrillator) present    Hypertension    Heart failure with reduced ejection fraction    ischemic cardiomyopathy    COPD    gastroesophageal reflux (GERD)    s/p Vasectomy    BLE PVD    s/p Lt foot w/  multiple toe amputated    REVIEW OF SYSTEMS: General/ Skin/ VAsc / MSK: see HPI  All other systems negative    MEDICATIONS  (STANDING):  ammonium lactate 12% Lotion 1 Application(s) Topical two times a day  aspirin  chewable 81 milliGRAM(s) Oral daily  atorvastatin 80 milliGRAM(s) Oral at bedtime  chlorhexidine 4% Liquid 1 Application(s) Topical <User Schedule>  clopidogrel Tablet 75 milliGRAM(s) Oral daily  heparin   Injectable 5000 Unit(s) SubCutaneous every 8 hours  insulin glargine Injectable (LANTUS) 35 Unit(s) SubCutaneous at bedtime  insulin lispro (ADMELOG) corrective regimen sliding scale   SubCutaneous Before meals and at bedtime  metoprolol succinate ER 50 milliGRAM(s) Oral daily  nicotine -   7 mG/24Hr(s) Patch 1 Patch Transdermal daily  pantoprazole    Tablet 40 milliGRAM(s) Oral before breakfast  tamsulosin 0.4 milliGRAM(s) Oral at bedtime  triamcinolone 0.1% Cream 1 Application(s) Topical every 12 hours    MEDICATIONS  (PRN):  acetaminophen     Tablet .. 650 milliGRAM(s) Oral every 6 hours PRN Temp greater or equal to 38C (100.4F), Mild Pain (1 - 3)  acetaminophen     Tablet .. 650 milliGRAM(s) Oral every 6 hours PRN Mild Pain (1 - 3), Moderate Pain (4 - 6)  acetaminophen  Suppository .. 650 milliGRAM(s) Rectal once PRN Temp greater or equal to 38C (100.4F)  albuterol    90 MICROgram(s) HFA Inhaler 2 Puff(s) Inhalation three times a day PRN Shortness of Breath and/or Wheezing  ALPRAZolam 0.25 milliGRAM(s) Oral two times a day PRN anxiety  dextrose 50% Injectable 25 Gram(s) IV Push every 15 minutes PRN blood glucose <70  diphenhydrAMINE 50 milliGRAM(s) Oral every 4 hours PRN Rash and/or Itching      Allergies  ceftriaxone (Rash)  Zosyn (Pruritus)      SOCIAL HISTORY:  ; Former smoker, Denies current smoking, ETOH, drugs    FAMILY HISTORY: no h/o PVD or wound healing or skin problems  Family history of COPD (chronic obstructive pulmonary disease) (Sibling) and cardiac disorder (Paternal)         PHYSICAL EXAM:  Vital Signs Last 24 Hrs  T(C): 36.6 (08 Mar 2023 08:28), Max: 36.8 (07 Mar 2023 23:48)  T(F): 97.8 (08 Mar 2023 08:28), Max: 98.2 (07 Mar 2023 23:48)  HR: 96 (08 Mar 2023 08:28) (96 - 104)  BP: 97/59 (08 Mar 2023 11:27) (86/54 - 116/63)  BP(mean): --  RR: 18 (08 Mar 2023 08:28) (18 - 19)  SpO2: 95% (08 Mar 2023 08:28) (92% - 95%)    Parameters below as of 08 Mar 2023 08:28  Patient On (Oxygen Delivery Method): room air      NAD,  A&Ox3, Obese  WD/ WN/ WG  Versa Care P500 bed  HEENT:  NC/AT, EOMI, sclera clear, mucosa moist, throat clear, trachea midline, neck supple  Respiratory: nonlabored w/ equal chest rise  Gastrointestinal soft NT/ND   Neurology:  strength  grossly intact, paraesthesia   Psych: calm/ appropriate  Musculoskeletal:  FROM, no contractures  Vascular: BLE equally warm/ cool,  no cyanosis, clubbing, no acute ischemia        L>RLE edema       BLE DP pulses palpable       BLE hemosiderin staining      LLE ruptured bullae w/ serous drainage          20cmx 13cm x 0cm      LLE 3rd and 5th toe ulcers w/o drainage      RLE dry eschar 1.5cm x 1.5cm x 0cm   Procedure Note  Using aseptic technique LLE wound was limited selective excisional debridement using scissor and forceps through nonviable epidermis.  Pt tolerated procedure well.  Hemostasis was maintained throughout.  Debulking debridement of nonviable tissue.  measurements same  20cmx 13cm x 0cm    No odor, erythema, increased warmth, tenderness, induration, fluctuance, nor crepitus  Skin:  moist w/ good turgor      LABS/ CULTURES/ RADIOLOGY:                        12.5   14.93 )-----------( 203      ( 08 Mar 2023 06:34 )             41.3       129  |  87  |  139  ----------------------------<  189      [03-08-23 @ 06:34]  3.6   |  23  |  3.10        Ca     9.3     [03-08-23 @ 06:34]      Mg     2.4     [03-07-23 @ 06:50]      Phos  4.4     [03-07-23 @ 06:50]    TPro  7.2  /  Alb  3.3  /  TBili  0.5  /  DBili  x   /  AST  31  /  ALT  36  /  AlkPhos  292  [03-08-23 @ 06:34]      < from: CT Lower Extremity No Cont, Bilateral (03.05.23 @ 12:29) >  ACC: [id number] EXAM:  CT LWR EXT BI   ORDERED BY: IAN ENCISO     PROCEDURE DATE:  03/05/2023          INTERPRETATION:  HISTORY: Bilateral lower extremity pain in the shins and   feet.    Helical CT imaging of the bilateral lower extremities from the level of   the knees to the level of the was performed without intravenous contrast.   Sagittal and coronal reformats were provided.    Findings:    Right lower extremity: There is circumferential subcutaneous edema   extending from the proximaldiaphyseal region of the tibia/fibula to the   level of the foot. Edema appears confined to the subcutaneous fat without   definite reticulation or stranding within the myofascial planes. There is   no subcutaneous air.    There is no evidence of acute fracture or dislocation. There is no   osseous erosion or destruction to suggest osteomyelitis. Visualized joint   spaces are preserved. There is no knee joint effusion. There is   atherosclerotic disease.    Left lower extremity: There is circumferential subcutaneous edema   extending from the level of the proximal tibial/fibular diaphysis to the   level of the foot. Edema appears confined to the subcutaneous fat without   definite reticulation of the deep myofascial planes. There is no   subcutaneous air. Scattered foci of soft tissue mineralization within the   medial and anterior subcutaneous fat at the level of the distal tibial   diaphysis which is likely chronic in nature.    There is no evidence of acute fracture or dislocation. There is no   osseous erosion or destruction is status osteomyelitis. Visualized joint   spaces are preserved. There is no joint effusion.    IMPRESSION:    Right lower extremity: Findings suggestive of cellulitis with extensive   circumferential subcutaneous edema about the tibia and fibula extending   to the foot. No subcutaneous air. No CT evidence of osteomyelitis.    Left lower extremity: Findings suggestive of cellulitis with extensive   circumferential subcutaneous edema about the tibia and fibula extending   to the foot. No subcutaneous air. No CT evidence of osteomyelitis.    < end of copied text >      ACC: [id number] EXAM:  DUPLEX SCAN EXT VEINS LOWER BI   ORDERED BY: IAN ENCISO     PROCEDURE DATE:  03/03/2023          INTERPRETATION:  CLINICAL INFORMATION: Fever and lower extremity edema.    COMPARISON: Left lower extremity duplex of 1/17/2022.    TECHNIQUE: Duplex sonography of the BILATERAL LOWER extremity veins with   color and spectral Doppler, with and without compression.    FINDINGS:    RIGHT:  Normal compressibility of the RIGHT common femoral, femoral and popliteal   veins.  Doppler examination shows normal spontaneous and phasic flow.  No RIGHT calf vein thrombosis is detected.    LEFT:  Normal compressibility of the LEFT common femoral, femoral and popliteal   veins.  Doppler examination shows normal spontaneous and phasic flow.  No LEFT calf vein thrombosis is detected.    Nonspecific prominent left groin node measuring up to 1.8 cm in short   axis.    IMPRESSION:  No evidence of deep venous thrombosis in either lower extremity.

## 2023-03-08 NOTE — CONSULT NOTE ADULT - REASON FOR ADMISSION
Hypotension requiring pressors in setting of hypoxia and fever.

## 2023-03-08 NOTE — PROGRESS NOTE ADULT - SUBJECTIVE AND OBJECTIVE BOX
[facility]  Lul Cruz  Office: [phone]  Cell: [phone]  Can also be reached on [employer] Teams       INTERVAL HPI/OVERNIGHT EVENTS: Feeling well today, no fever or chills. Has some tingling b/l lower extremity. No SOB, no chest pain or palpitations. Urinating and having bowel movements. Now off antibiotics, last does yesterday.       REVIEW OF SYSTEMS:  Negative except per HPI    Vital Signs Last 24 Hrs  T(C): 36.6 (08 Mar 2023 08:28), Max: 36.8 (07 Mar 2023 23:48)  T(F): 97.8 (08 Mar 2023 08:28), Max: 98.2 (07 Mar 2023 23:48)  HR: 96 (08 Mar 2023 08:28) (96 - 104)  BP: 97/59 (08 Mar 2023 11:27) (86/54 - 116/63)  BP(mean): --  RR: 18 (08 Mar 2023 08:28) (18 - 19)  SpO2: 95% (08 Mar 2023 08:28) (92% - 95%)    Parameters below as of 08 Mar 2023 08:28  Patient On (Oxygen Delivery Method): room air        PHYSICAL EXAMINATION:  GENERAL: NAD, well built  HEAD:  Atraumatic, Normocephalic  EYES:  conjunctiva and sclera clear  NECK: Supple, No JVD, Normal thyroid  CHEST/LUNG: Diminished breath sounds some mild rhonchi  HEART: Regular rate and rhythm; tachycardic  ABDOMEN:nontender, getting increasingly tense and firm,  NERVOUS SYSTEM:  Alert & Oriented X3,    EXTREMITIES:  2bilateral legs wrapped with gauze cling wrap                          12.5   14.93 )-----------( 203      ( 08 Mar 2023 06:34 )             41.3     03-08    129<L>  |  87<L>  |  139<H>  ----------------------------<  189<H>  3.6   |  23  |  3.10<H>    Ca    9.3      08 Mar 2023 06:34  Phos  4.4     03-07  Mg     2.4     03-07    TPro  7.2  /  Alb  3.3  /  TBili  0.5  /  DBili  x   /  AST  31  /  ALT  36  /  AlkPhos  292<H>  03-08    LIVER FUNCTIONS - ( 08 Mar 2023 06:34 )  Alb: 3.3 g/dL / Pro: 7.2 g/dL / ALK PHOS: 292 U/L / ALT: 36 U/L / AST: 31 U/L / GGT: x           CARDIAC MARKERS ( 07 Mar 2023 06:50 )  x     / x     / 307 U/L / x     / x      CARDIAC MARKERS ( 06 Mar 2023 17:26 )  x     / x     / 369 U/L / x     / x              CAPILLARY BLOOD GLUCOSE      RADIOLOGY & ADDITIONAL TESTS:

## 2023-03-08 NOTE — CONSULT NOTE ADULT - ASSESSMENT
A/P:  62 year old male with PMHx of CHFrEF more recently 29%, has AICD, cardiomyopathy due to ischemia, Diabetes, COPD who was referred to hospital due to concerns for septic shock.      Wound Consult requested to assist w/ management of BLE PVD w/ stasis dermatitis   LLE wounds  BLE resolving cellulitis    LLE- Aqucel dressing w/ ACE wrapping  BLE CT (+)cellulitis no fx disloc fB  BLE Duplex no DVT  BLE elevation & Compression  Abx per Medicine/ ID  Moisturize intact skin w/ SWEEN cream BID  Nutrition  optimization          encourage high quality protein, MVI & Vit C to promote wound healing  Hyperglycemia - ADA diet and Lantus/ NPH,         FS w/ ISS q6h/ qmeal & qhs, consider Endo Consult, consider HgA1c  Continue turning and positioning w/ offloading assistive devices as per protocol  Buttocks/ Sacrum Kirstie/ TRIAD BID /CAVILON ADVANCE TIW and prn soiling //       Continue w/ attends under pads and Pericare w/ tinoco maintanence / purewick care as per protocol  Waffle Cushion to chair when oob to chair  Continue w/ low air loss pressure redistribution bed surface   Care as per medicine, will follow w/ you  Upon discharge f/u as outpatient at [facility] [address] [facility] [phone]  Seen w/ attng & RN and D/w team  Thank you for this consult  Oralia Esposito PA-C [id number]  I spent 55 minutes face to face w/ this pt of which more than 50% of the time was spent counseling & coordinating care of this pt.  A/P:  62 year old male with PMHx of CHFrEF more recently 29%, has AICD, cardiomyopathy due to ischemia, Diabetes, COPD who was referred to hospital due to concerns for septic shock.      Wound Consult requested to assist w/ management of:  BLE PVD w/ stasis dermatitis   LLE wounds  BLE resolving cellulitis    LLE- Aqucel dressing w/ ACE wrapping  LLE toes-- as per DPM  RLE- betadine  BLE CT (+)cellulitis no fx disloc fB  BLE Duplex no DVT  BLE elevation & Compression  Abx per Medicine/ ID  Moisturize intact skin w/ SWEEN cream BID  Nutrition  optimization          encourage high quality protein, MVI & Vit C to promote wound healing  Hyperglycemia - ADA diet and Lantus/ NPH,         FS w/ ISS q6h/ qmeal & qhs, consider Endo Consult, consider HgA1c  Continue turning and positioning w/ offloading assistive devices as per protocol  Buttocks/ Sacrum Kirstie/ TRIAD BID /CAVILON ADVANCE TIW and prn soiling //       Continue w/ attends under pads and Pericare w/ tinoco maintanence / purewick care as per protocol  Waffle Cushion to chair when oob to chair  Continue w/ low air loss pressure redistribution bed surface   Care as per medicine, will follow w/ you  Upon discharge f/u as outpatient at Wound Center [address] [facility] [phone]  Seen w/ attfay & RN and D/w team  Thank you for this consult  Oralia Esposito PA-C [id number]

## 2023-03-08 NOTE — CONSULT NOTE ADULT - NS ATTEND AMEND GEN_ALL_CORE FT
Pt seen and examined with ACP.  Assessment and plan reviewed and discussed.  Agree with above.    Status of wounds and treatment recommendations d/w  pt.  All questions answered.   Pt expressed understanding.    I spent 55  minutes face to face w/ this pt of which more than 50% of the time was spent counseling & coordinating care of this pt.

## 2023-03-08 NOTE — PROGRESS NOTE ADULT - SUBJECTIVE AND OBJECTIVE BOX
Follow Up: fever, shock, rash     Interval History/ROS: Afebrile. Rash and itching are better. No diarrhea, dysuria or cough. Feels pretty good overall.     Allergies  ceftriaxone (Rash)  Zosyn (Pruritus)        ANTIMICROBIALS:      OTHER MEDS:  acetaminophen     Tablet .. 650 milliGRAM(s) Oral every 6 hours PRN  acetaminophen     Tablet .. 650 milliGRAM(s) Oral every 6 hours PRN  acetaminophen  Suppository .. 650 milliGRAM(s) Rectal once PRN  albuterol    90 MICROgram(s) HFA Inhaler 2 Puff(s) Inhalation three times a day PRN  ALPRAZolam 0.25 milliGRAM(s) Oral two times a day PRN  ammonium lactate 12% Lotion 1 Application(s) Topical two times a day  aspirin  chewable 81 milliGRAM(s) Oral daily  atorvastatin 80 milliGRAM(s) Oral at bedtime  chlorhexidine 4% Liquid 1 Application(s) Topical <User Schedule>  clopidogrel Tablet 75 milliGRAM(s) Oral daily  dextrose 50% Injectable 25 Gram(s) IV Push every 15 minutes PRN  diphenhydrAMINE 50 milliGRAM(s) Oral every 4 hours PRN  heparin   Injectable 5000 Unit(s) SubCutaneous every 8 hours  insulin glargine Injectable (LANTUS) 35 Unit(s) SubCutaneous at bedtime  insulin lispro (ADMELOG) corrective regimen sliding scale   SubCutaneous Before meals and at bedtime  metoprolol succinate ER 50 milliGRAM(s) Oral daily  nicotine -   7 mG/24Hr(s) Patch 1 Patch Transdermal daily  pantoprazole    Tablet 40 milliGRAM(s) Oral before breakfast  tamsulosin 0.4 milliGRAM(s) Oral at bedtime  triamcinolone 0.1% Cream 1 Application(s) Topical every 12 hours      Vital Signs Last 24 Hrs  T(C): 36.6 (08 Mar 2023 08:28), Max: 36.8 (07 Mar 2023 23:48)  T(F): 97.8 (08 Mar 2023 08:28), Max: 98.2 (07 Mar 2023 23:48)  HR: 96 (08 Mar 2023 08:28) (96 - 104)  BP: 97/59 (08 Mar 2023 11:27) (86/54 - 116/63)  BP(mean): --  RR: 18 (08 Mar 2023 08:28) (18 - 19)  SpO2: 95% (08 Mar 2023 08:28) (92% - 95%)    Parameters below as of 08 Mar 2023 08:28  Patient On (Oxygen Delivery Method): room air        Physical Exam:  General: non toxic  Cardio: regular rate   Respiratory: nonlabored on room air   abd: nondistended  Musculoskeletal: no focal joint swelling, no edema  vascular: no phlebitis   Skin: both lower legs wrapped. left with stable erythema and not hot or tender, associated ruptured bullae without purulence.                           12.5   14.93 )-----------( 203      ( 08 Mar 2023 06:34 )             41.3       03-08    129<L>  |  87<L>  |  139<H>  ----------------------------<  189<H>  3.6   |  23  |  3.10<H>    Ca    9.3      08 Mar 2023 06:34  Phos  4.4     03-07  Mg     2.4     03-07    TPro  7.2  /  Alb  3.3  /  TBili  0.5  /  DBili  x   /  AST  31  /  ALT  36  /  AlkPhos  292<H>  03-08      Urinalysis Basic - ( 08 Mar 2023 06:39 )    Color: Light Yellow / Appearance: Clear / S.012 / pH: x  Gluc: x / Ketone: Negative  / Bili: Negative / Urobili: Negative   Blood: x / Protein: 30 mg/dL / Nitrite: Negative   Leuk Esterase: Negative / RBC: 17 /hpf / WBC 4 /HPF   Sq Epi: x / Non Sq Epi: 0 /hpf / Bacteria: Negative        MICROBIOLOGY:  Culture - Sputum (collected 23 @ 01:02)  Source: .Sputum Sputum  Gram Stain (23 @ 09:27):    Few polymorphonuclear leukocytes seen per low power field    Rare Squamous epithelial cells seen per low power field    No organisms seen  Final Report (23 @ 09:53):    Normal Respiratory Dorothy present    Culture - Urine (collected 23 @ 18:53)  Source: Clean Catch Clean Catch (Midstream)  Final Report (23 @ 23:03):    <10,000 CFU/mL Normal Urogenital Dorothy    Culture - Blood (collected 23 @ 17:24)  Source: .Blood Blood-Peripheral  Final Report (23 @ 22:00):    No Growth Final    Culture - Blood (collected 23 @ 17:24)  Source: .Blood Blood-Peripheral  Final Report (23 @ 22:00):    No Growth Final    RADIOLOGY:  Images below reviewed personally  CT Lower Extremity No Cont, Bilateral (23 @ 12:29)   Right lower extremity: Findings suggestive of cellulitis with extensive   circumferential subcutaneous edema about the tibia and fibula extending   to the foot. No subcutaneous air. No CT evidence of osteomyelitis.  Left lower extremity: Findings suggestive of cellulitis with extensive   circumferential subcutaneous edema about the tibia and fibula extending   to the foot. No subcutaneous air. No CT evidence of osteomyelitis.

## 2023-03-08 NOTE — PROGRESS NOTE ADULT - ASSESSMENT
62M A1c 7.4%, COPD, ischemic cardiomyopathy.   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   Multiple admissions for hypotension.   Here 3/1 with shock but febrile too.   He has no recollection of what happened.   Improved after empiric Vanc and Cefepime but I don't know what we treated.   Blood, urine and sputum cultures negative and CT chest abdomen unrevealing.   Right shin ulcer but no obvious cellulitis.   Developed a pruritic rash over his torso and left leg erythema with bullae over the weekend.   Derm suspects Vancomycin drug rash and resolving bullous cellulitis.   The left leg looked fine to me last week.   Doubt reaction to Ceftriaxone. Tolerated 6 weeks in 2020 and Cefepime this admission.   Unclear Zosyn reaction, reportedly had itching in ED on 12/12/2022 but he doesn't recall.   Rash is much better and left leg is stable.     Suggest  -monitor off antibiotics, I think he got enough for a cellulitis and cause of rash not certain   -outpatient allergy evaluation     Discussed with medicine   Will sign off, call back if needed    Sin Echeverria MD   Infectious Disease   Available on TEAMS. After 5PM and on weekends please page fellow on call or call [phone]

## 2023-03-08 NOTE — PROGRESS NOTE ADULT - ASSESSMENT
Patient is a 62 year old male with PMHx of CHFrEF more recently 29%, has AICD, cardiomyopathy due to ischemia, Diabetes, COPD who was referred to hospital due to concerns for septic shock.    1) Sepsis with unclear source  Improved now  Appreciate ID reconsult  - monitor off abx  - white count improving no further fevers, pain in leg improved    2) acute on chronic kidney injury  - stabilized, improving  - will defer further diuretics to nephro    3) CHFrEF  Off pressors  Repeat echo with ef 25  - Resumed metoprolol  - Need Heart failure consult    4) DMII  - getting better controlled  - On basal insulin and sliding scale.  - Titrate as per blood glucose levels    5) COPD  - Controlled with ERVIN prn  - Few symptoms, minimal exacerbations, and no recent hospitalizations due to exacerbation  - nicotine patches and gum as needed  - No wheezing on exam, however now hypoxia- probably due to chf    6) Right Hydronephrosis  - no evidence of stone, more consistent with chronic outlet obstruction  - Routine bladder scan avoid urinary retention.  - considering adding finasteride to regimen      7) Anxiety  - controlled with low dose Xanax at home 0.25mg  - dc dilaudid    8) hyponatremia  - continue hold Diuretics  - appreciate nephro f.u    9)thrombocytopenia  - stable, continue to monitor

## 2023-03-09 LAB
ANION GAP SERPL CALC-SCNC: 18 MMOL/L — HIGH (ref 5–17)
BUN SERPL-MCNC: 128 MG/DL — HIGH (ref 7–23)
CALCIUM SERPL-MCNC: 9.6 MG/DL — SIGNIFICANT CHANGE UP (ref 8.4–10.5)
CHLORIDE SERPL-SCNC: 88 MMOL/L — LOW (ref 96–108)
CO2 SERPL-SCNC: 24 MMOL/L — SIGNIFICANT CHANGE UP (ref 22–31)
CREAT SERPL-MCNC: 2.45 MG/DL — HIGH (ref 0.5–1.3)
EGFR: 29 ML/MIN/1.73M2 — LOW
GLUCOSE BLDC GLUCOMTR-MCNC: 189 MG/DL — HIGH (ref 70–99)
GLUCOSE BLDC GLUCOMTR-MCNC: 200 MG/DL — HIGH (ref 70–99)
GLUCOSE BLDC GLUCOMTR-MCNC: 231 MG/DL — HIGH (ref 70–99)
GLUCOSE BLDC GLUCOMTR-MCNC: 284 MG/DL — HIGH (ref 70–99)
GLUCOSE SERPL-MCNC: 192 MG/DL — HIGH (ref 70–99)
HCT VFR BLD CALC: 40 % — SIGNIFICANT CHANGE UP (ref 39–50)
HGB BLD-MCNC: 12.5 G/DL — LOW (ref 13–17)
MCHC RBC-ENTMCNC: 24 PG — LOW (ref 27–34)
MCHC RBC-ENTMCNC: 31.3 GM/DL — LOW (ref 32–36)
MCV RBC AUTO: 76.8 FL — LOW (ref 80–100)
NRBC # BLD: 0 /100 WBCS — SIGNIFICANT CHANGE UP (ref 0–0)
PLATELET # BLD AUTO: 229 K/UL — SIGNIFICANT CHANGE UP (ref 150–400)
POTASSIUM SERPL-MCNC: 3.5 MMOL/L — SIGNIFICANT CHANGE UP (ref 3.5–5.3)
POTASSIUM SERPL-SCNC: 3.5 MMOL/L — SIGNIFICANT CHANGE UP (ref 3.5–5.3)
RBC # BLD: 5.21 M/UL — SIGNIFICANT CHANGE UP (ref 4.2–5.8)
RBC # FLD: 18.2 % — HIGH (ref 10.3–14.5)
SODIUM SERPL-SCNC: 130 MMOL/L — LOW (ref 135–145)
WBC # BLD: 18.45 K/UL — HIGH (ref 3.8–10.5)
WBC # FLD AUTO: 18.45 K/UL — HIGH (ref 3.8–10.5)

## 2023-03-09 PROCEDURE — 99233 SBSQ HOSP IP/OBS HIGH 50: CPT

## 2023-03-09 PROCEDURE — 99233 SBSQ HOSP IP/OBS HIGH 50: CPT | Mod: GC

## 2023-03-09 RX ORDER — CEFAZOLIN SODIUM 1 G
VIAL (EA) INJECTION
Refills: 0 | Status: DISCONTINUED | OUTPATIENT
Start: 2023-03-09 | End: 2023-03-11

## 2023-03-09 RX ORDER — CEFAZOLIN SODIUM 1 G
1000 VIAL (EA) INJECTION EVERY 8 HOURS
Refills: 0 | Status: DISCONTINUED | OUTPATIENT
Start: 2023-03-09 | End: 2023-03-11

## 2023-03-09 RX ORDER — CEFAZOLIN SODIUM 1 G
1000 VIAL (EA) INJECTION ONCE
Refills: 0 | Status: COMPLETED | OUTPATIENT
Start: 2023-03-09 | End: 2023-03-09

## 2023-03-09 RX ORDER — BUMETANIDE 0.25 MG/ML
2 INJECTION INTRAMUSCULAR; INTRAVENOUS ONCE
Refills: 0 | Status: COMPLETED | OUTPATIENT
Start: 2023-03-09 | End: 2023-03-09

## 2023-03-09 RX ORDER — LANOLIN ALCOHOL/MO/W.PET/CERES
3 CREAM (GRAM) TOPICAL ONCE
Refills: 0 | Status: COMPLETED | OUTPATIENT
Start: 2023-03-09 | End: 2023-03-09

## 2023-03-09 RX ORDER — INSULIN GLARGINE 100 [IU]/ML
40 INJECTION, SOLUTION SUBCUTANEOUS AT BEDTIME
Refills: 0 | Status: DISCONTINUED | OUTPATIENT
Start: 2023-03-09 | End: 2023-03-11

## 2023-03-09 RX ADMIN — Medication 50 MILLIGRAM(S): at 23:54

## 2023-03-09 RX ADMIN — Medication 6: at 21:08

## 2023-03-09 RX ADMIN — Medication 1 PATCH: at 07:30

## 2023-03-09 RX ADMIN — Medication 1 PATCH: at 20:00

## 2023-03-09 RX ADMIN — Medication 100 MILLIGRAM(S): at 17:00

## 2023-03-09 RX ADMIN — Medication 4: at 12:10

## 2023-03-09 RX ADMIN — INSULIN GLARGINE 40 UNIT(S): 100 INJECTION, SOLUTION SUBCUTANEOUS at 21:03

## 2023-03-09 RX ADMIN — Medication 0.25 MILLIGRAM(S): at 21:02

## 2023-03-09 RX ADMIN — Medication 3 MILLIGRAM(S): at 23:19

## 2023-03-09 RX ADMIN — Medication 2: at 17:16

## 2023-03-09 RX ADMIN — HEPARIN SODIUM 5000 UNIT(S): 5000 INJECTION INTRAVENOUS; SUBCUTANEOUS at 17:01

## 2023-03-09 RX ADMIN — PANTOPRAZOLE SODIUM 40 MILLIGRAM(S): 20 TABLET, DELAYED RELEASE ORAL at 07:11

## 2023-03-09 RX ADMIN — Medication 81 MILLIGRAM(S): at 11:57

## 2023-03-09 RX ADMIN — Medication 1 PATCH: at 11:55

## 2023-03-09 RX ADMIN — ATORVASTATIN CALCIUM 80 MILLIGRAM(S): 80 TABLET, FILM COATED ORAL at 21:02

## 2023-03-09 RX ADMIN — Medication 1 APPLICATION(S): at 17:17

## 2023-03-09 RX ADMIN — Medication 100 MILLIGRAM(S): at 21:18

## 2023-03-09 RX ADMIN — CLOPIDOGREL BISULFATE 75 MILLIGRAM(S): 75 TABLET, FILM COATED ORAL at 11:57

## 2023-03-09 RX ADMIN — TAMSULOSIN HYDROCHLORIDE 0.4 MILLIGRAM(S): 0.4 CAPSULE ORAL at 21:02

## 2023-03-09 RX ADMIN — HEPARIN SODIUM 5000 UNIT(S): 5000 INJECTION INTRAVENOUS; SUBCUTANEOUS at 08:26

## 2023-03-09 RX ADMIN — CHLORHEXIDINE GLUCONATE 1 APPLICATION(S): 213 SOLUTION TOPICAL at 05:35

## 2023-03-09 RX ADMIN — Medication 1 APPLICATION(S): at 05:33

## 2023-03-09 RX ADMIN — Medication 1 PATCH: at 11:57

## 2023-03-09 RX ADMIN — Medication 2: at 08:23

## 2023-03-09 RX ADMIN — BUMETANIDE 2 MILLIGRAM(S): 0.25 INJECTION INTRAMUSCULAR; INTRAVENOUS at 17:00

## 2023-03-09 NOTE — PROGRESS NOTE ADULT - SUBJECTIVE AND OBJECTIVE BOX
INTERVAL HPI/OVERNIGHT EVENTS:    S:  Patient is a 62y Male Patient is a 62y old  Male who presents with a chief complaint of Hypotension requiring pressors in setting of hypoxia and fever. (09 Mar 2023 16:00)    **No overnight events.**    Pt feels well this morning, feels rash on body is improved   ________________________________    REVIEW OF SYSTEMS      General: no fevers/chills, no NS	    Skin: see HPI  	  Ophthalmologic: no eye pain or change in vision    Genitourinary: no dysuria or hematuria    Musculoskeletal: no joint pains or weakness	    Neurological:no weakness or tingling        ROS negative except if noted in the above subjective text. All other systems reviewed and negative.    MEDICATIONS  (STANDING):  ammonium lactate 12% Lotion 1 Application(s) Topical two times a day  aspirin  chewable 81 milliGRAM(s) Oral daily  atorvastatin 80 milliGRAM(s) Oral at bedtime  ceFAZolin   IVPB 1000 milliGRAM(s) IV Intermittent every 8 hours  ceFAZolin   IVPB      chlorhexidine 4% Liquid 1 Application(s) Topical <User Schedule>  clopidogrel Tablet 75 milliGRAM(s) Oral daily  heparin   Injectable 5000 Unit(s) SubCutaneous every 8 hours  insulin glargine Injectable (LANTUS) 40 Unit(s) SubCutaneous at bedtime  insulin lispro (ADMELOG) corrective regimen sliding scale   SubCutaneous Before meals and at bedtime  metoprolol succinate ER 50 milliGRAM(s) Oral daily  nicotine -   7 mG/24Hr(s) Patch 1 Patch Transdermal daily  pantoprazole    Tablet 40 milliGRAM(s) Oral before breakfast  tamsulosin 0.4 milliGRAM(s) Oral at bedtime  triamcinolone 0.1% Cream 1 Application(s) Topical every 12 hours    MEDICATIONS  (PRN):  acetaminophen     Tablet .. 650 milliGRAM(s) Oral every 6 hours PRN Temp greater or equal to 38C (100.4F), Mild Pain (1 - 3)  acetaminophen     Tablet .. 650 milliGRAM(s) Oral every 6 hours PRN Mild Pain (1 - 3), Moderate Pain (4 - 6)  acetaminophen  Suppository .. 650 milliGRAM(s) Rectal once PRN Temp greater or equal to 38C (100.4F)  albuterol    90 MICROgram(s) HFA Inhaler 2 Puff(s) Inhalation three times a day PRN Shortness of Breath and/or Wheezing  ALPRAZolam 0.25 milliGRAM(s) Oral two times a day PRN anxiety  dextrose 50% Injectable 25 Gram(s) IV Push every 15 minutes PRN blood glucose <70  diphenhydrAMINE 50 milliGRAM(s) Oral every 4 hours PRN Rash and/or Itching      Allergies    ceftriaxone (Rash)  Zosyn (Pruritus)    Intolerances          O: ICU Vital Signs Last 24 Hrs  T(C): 36.8 (09 Mar 2023 15:07), Max: 37 (09 Mar 2023 08:39)  T(F): 98.3 (09 Mar 2023 15:07), Max: 98.6 (09 Mar 2023 08:39)  HR: 88 (09 Mar 2023 15:07) (88 - 96)  BP: 103/67 (09 Mar 2023 15:07) (93/55 - 103/67)  BP(mean): --  ABP: --  ABP(mean): --  RR: 18 (09 Mar 2023 15:07) (18 - 18)  SpO2: 96% (09 Mar 2023 15:07) (94% - 96%)    O2 Parameters below as of 09 Mar 2023 15:07  Patient On (Oxygen Delivery Method): room air          I&O's Detail    08 Mar 2023 07:01  -  09 Mar 2023 07:00  --------------------------------------------------------  IN:  Total IN: 0 mL    OUT:    Voided (mL): 400 mL  Total OUT: 400 mL    Total NET: -400 mL          PHYSICAL EXAM:     The patient was alert and oriented X 3, well nourished, and in no  apparent distress.  OP showed no ulcerations  There was no visible lymphadenopathy.  Conjunctiva were non injected  There was no clubbing or edema of extremities.  The scalp, hair, face, eyebrows, lips, OP, neck, chest, back,   extremities X 4, nails were examined.  There was no hyperhidrosis or bromhidrosis.    Of note on skin exam: Large weeping erosion on the L shin overlying an erythematous plaque. Diffuse morbiliform rash on the trunk and extremities, resolving     ____________________________________      Labs:                       12.5   18.45 )-----------( 229      ( 09 Mar 2023 07:21 )             40.0     CBC Full  -  ( 09 Mar 2023 07:21 )  WBC Count : 18.45 K/uL  RBC Count : 5.21 M/uL  Hemoglobin : 12.5 g/dL  Hematocrit : 40.0 %  Platelet Count - Automated : 229 K/uL  Mean Cell Volume : 76.8 fl  Mean Cell Hemoglobin : 24.0 pg  Mean Cell Hemoglobin Concentration : 31.3 gm/dL  Auto Neutrophil # : x  Auto Lymphocyte # : x  Auto Monocyte # : x  Auto Eosinophil # : x  Auto Basophil # : x  Auto Neutrophil % : x  Auto Lymphocyte % : x  Auto Monocyte % : x  Auto Eosinophil % : x  Auto Basophil % : x    03-    130<L>  |  88<L>  |  128<H>  ----------------------------<  192<H>  3.5   |  24  |  2.45<H>    Ca    9.6      09 Mar 2023 07:21    TPro  7.2  /  Alb  3.3  /  TBili  0.5  /  DBili  x   /  AST  31  /  ALT  36  /  AlkPhos  292<H>  0308      Urinalysis Basic - ( 08 Mar 2023 06:39 )    Color: Light Yellow / Appearance: Clear / S.012 / pH: x  Gluc: x / Ketone: Negative  / Bili: Negative / Urobili: Negative   Blood: x / Protein: 30 mg/dL / Nitrite: Negative   Leuk Esterase: Negative / RBC: 17 /hpf / WBC 4 /HPF   Sq Epi: x / Non Sq Epi: 0 /hpf / Bacteria: Negative      CAPILLARY BLOOD GLUCOSE      POCT Blood Glucose.: 200 mg/dL (09 Mar 2023 16:28)

## 2023-03-09 NOTE — PROGRESS NOTE ADULT - SUBJECTIVE AND OBJECTIVE BOX
[facility]  Lul Cruz  Office: [phone]  Cell: [phone]  Can also be reached on [employer] Teams     INTERVAL HPI/OVERNIGHT EVENTS: Patient's white count increasing, suspect increased erythema lower extremity. Still withoujt pain, denies any fever or chills. No sob or chest pain. Ambulated around the unit, tolerated well, no SOB.       REVIEW OF SYSTEMS:  Negative except per HPI    Vital Signs Last 24 Hrs  T(C): 37 (09 Mar 2023 08:39), Max: 37 (09 Mar 2023 08:39)  T(F): 98.6 (09 Mar 2023 08:39), Max: 98.6 (09 Mar 2023 08:39)  HR: 92 (09 Mar 2023 08:39) (92 - 96)  BP: 95/60 (09 Mar 2023 08:39) (93/55 - 98/51)  BP(mean): --  RR: 18 (09 Mar 2023 08:39) (18 - 18)  SpO2: 96% (09 Mar 2023 08:39) (94% - 96%)    Parameters below as of 09 Mar 2023 08:39  Patient On (Oxygen Delivery Method): room air        PHYSICAL EXAMINATION:  GENERAL: NAD, well built  HEAD:  Atraumatic, Normocephalic  EYES:  conjunctiva and sclera clear  NECK: Supple, No JVD, Normal thyroid  CHEST/LUNG: Diminished breath sounds some mild rhonchi  HEART: Regular rate and rhythm; tachycardic  ABDOMEN: Increasing in size  NERVOUS SYSTEM:  Alert & Oriented X3,    EXTREMITIES:  left lower extremity with erythema, more warm compared to the right.                        12.5   18.45 )-----------( 229      ( 09 Mar 2023 07:21 )             40.0     03-09    130<L>  |  88<L>  |  128<H>  ----------------------------<  192<H>  3.5   |  24  |  2.45<H>    Ca    9.6      09 Mar 2023 07:21    TPro  7.2  /  Alb  3.3  /  TBili  0.5  /  DBili  x   /  AST  31  /  ALT  36  /  AlkPhos  292<H>  03-08    LIVER FUNCTIONS - ( 08 Mar 2023 06:34 )  Alb: 3.3 g/dL / Pro: 7.2 g/dL / ALK PHOS: 292 U/L / ALT: 36 U/L / AST: 31 U/L / GGT: x                   CAPILLARY BLOOD GLUCOSE      RADIOLOGY & ADDITIONAL TESTS:

## 2023-03-09 NOTE — PROGRESS NOTE ADULT - SUBJECTIVE AND OBJECTIVE BOX
[facility]   [phone]  RENAL FOLLOW UP NOTE  --------------------------------------------------------------------------------  Chief Complaint:    24 hour events/subjective:    seen earlier   states feels better   drinking increase amount of fluids per pt   states urinating     PAST HISTORY  --------------------------------------------------------------------------------  No significant changes to PMH, PSH, FHx, SHx, unless otherwise noted    ALLERGIES & MEDICATIONS  --------------------------------------------------------------------------------  Allergies    ceftriaxone (Rash)  vancomycin (Rash (Mild to Mod))  Zosyn (Pruritus)    Intolerances      Standing Inpatient Medications  ammonium lactate 12% Lotion 1 Application(s) Topical two times a day  aspirin  chewable 81 milliGRAM(s) Oral daily  atorvastatin 80 milliGRAM(s) Oral at bedtime  ceFAZolin   IVPB 1000 milliGRAM(s) IV Intermittent every 8 hours  ceFAZolin   IVPB      chlorhexidine 4% Liquid 1 Application(s) Topical <User Schedule>  clopidogrel Tablet 75 milliGRAM(s) Oral daily  heparin   Injectable 5000 Unit(s) SubCutaneous every 8 hours  insulin glargine Injectable (LANTUS) 40 Unit(s) SubCutaneous at bedtime  insulin lispro (ADMELOG) corrective regimen sliding scale   SubCutaneous Before meals and at bedtime  metoprolol succinate ER 50 milliGRAM(s) Oral daily  nicotine -   7 mG/24Hr(s) Patch 1 Patch Transdermal daily  pantoprazole    Tablet 40 milliGRAM(s) Oral before breakfast  tamsulosin 0.4 milliGRAM(s) Oral at bedtime  triamcinolone 0.1% Cream 1 Application(s) Topical every 12 hours    PRN Inpatient Medications  acetaminophen     Tablet .. 650 milliGRAM(s) Oral every 6 hours PRN  acetaminophen     Tablet .. 650 milliGRAM(s) Oral every 6 hours PRN  acetaminophen  Suppository .. 650 milliGRAM(s) Rectal once PRN  albuterol    90 MICROgram(s) HFA Inhaler 2 Puff(s) Inhalation three times a day PRN  ALPRAZolam 0.25 milliGRAM(s) Oral two times a day PRN  dextrose 50% Injectable 25 Gram(s) IV Push every 15 minutes PRN  diphenhydrAMINE 50 milliGRAM(s) Oral every 4 hours PRN      REVIEW OF SYSTEMS  --------------------------------------------------------------------------------    Gen: denies  fevers/chills,  CVS: denies chest pain/palpitations  Resp: denies SOB/Cough  GI: Denies N/V/Abd pain  : Denies dysuria    VITALS/PHYSICAL EXAM  --------------------------------------------------------------------------------  T(C): 36.8 (03-09-23 @ 15:07), Max: 37 (03-09-23 @ 08:39)  HR: 88 (03-09-23 @ 15:07) (88 - 96)  BP: 103/67 (03-09-23 @ 15:07) (93/55 - 103/67)  RR: 18 (03-09-23 @ 15:07) (18 - 18)  SpO2: 96% (03-09-23 @ 15:07) (94% - 96%)  Wt(kg): --        03-08-23 @ 07:01  -  03-09-23 @ 07:00  --------------------------------------------------------  IN: 0 mL / OUT: 400 mL / NET: -400 mL    03-09-23 @ 07:01  -  03-09-23 @ 22:46  --------------------------------------------------------  IN: 50 mL / OUT: 600 mL / NET: -550 mL      Physical Exam:  	  Gen:  on RA   	Pulm: decrease bs  no rales or ronchi or wheezing  	CV: no JVD. RRR, S1S2; no rub  	Abd: +BS, soft, nontender/nondistended, obese  	UE: Warm, no cyanosis  no clubbing,  no edema;   	LE: Warm, no cyanosis  no clubbing, no edema, RLE erhythema + skin ulcer present on admission as well   	Neuro: alert and oriented.  	Skin:  + rash macular back thoracic and left leg area. Left leg is  erythematous and with left leg edema weeping at ankle left  + dressing     LABS/STUDIES  --------------------------------------------------------------------------------              12.5   18.45 >-----------<  229      [03-09-23 @ 07:21]              40.0     130  |  88  |  128  ----------------------------<  192      [03-09-23 @ 07:21]  3.5   |  24  |  2.45        Ca     9.6     [03-09-23 @ 07:21]    TPro  7.2  /  Alb  3.3  /  TBili  0.5  /  DBili  x   /  AST  31  /  ALT  36  /  AlkPhos  292  [03-08-23 @ 06:34]        Serum Osmolality 324      [03-08-23 @ 06:34]    Creatinine Trend:  SCr 2.45 [03-09 @ 07:21]  SCr 3.10 [03-08 @ 06:34]  SCr 3.47 [03-07 @ 06:50]  SCr 3.27 [03-06 @ 17:26]  SCr 3.35 [03-06 @ 07:38]              Urinalysis - [03-08-23 @ 06:39]      Color Light Yellow / Appearance Clear / SG 1.012 / pH 6.0      Gluc Negative / Ketone Negative  / Bili Negative / Urobili Negative       Blood Trace / Protein 30 mg/dL / Leuk Est Negative / Nitrite Negative      RBC 17 / WBC 4 / Hyaline 7 / Gran  / Sq Epi  / Non Sq Epi 0 / Bacteria Negative    Urine Creatinine 50      [03-08-23 @ 06:40]  Urine Sodium 67      [03-08-23 @ 06:40]  Urine Urea Nitrogen 433      [03-08-23 @ 06:40]  Urine Osmolality 346      [03-08-23 @ 06:40]    Iron 14, TIBC --, %sat --      [03-02-23 @ 01:20]  Ferritin 225      [03-02-23 @ 01:18]  PTH -- (Ca 8.9)      [10-02-22 @ 07:04]   73  HbA1c 8.9      [12-16-19 @ 08:15]  TSH 3.02      [09-30-22 @ 13:45]

## 2023-03-09 NOTE — PROGRESS NOTE ADULT - ASSESSMENT
62M A1c 7.4%, COPD, ischemic cardiomyopathy.   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   Multiple admissions for hypotension in the past year with or without fever.   Here 3/1 with shock but febrile too.   He has no recollection of what happened.   Improved after empiric Vanc and Cefepime but I don't know what we treated.   Blood, urine and sputum cultures negative and CT chest abdomen unrevealing.   Right shin ulcer but no obvious cellulitis.   Developed a pruritic rash over his torso and left leg erythema with bullae over the weekend.   Derm suspects Vancomycin drug rash and resolving bullous cellulitis.   The left leg looked fine to me last week.   Doubt reaction to Ceftriaxone. Tolerated 6 weeks in 2020 and Cefepime this admission.   Unclear Zosyn reaction, reportedly had itching in ED on 12/12/2022 but he doesn't recall.   Rash is much better and left leg is stable.     Suggest  -monitor off antibiotics, I think he got enough for a cellulitis and cause of rash not certain   -outpatient allergy evaluation     Discussed with medicine   Will sign off, call back if needed    Sin Echeverria MD   Infectious Disease   Available on TEAMS. After 5PM and on weekends please page fellow on call or call [phone] 62M A1c 7.4%, COPD, ischemic cardiomyopathy.     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     Multiple admissions for hypotension in the past year.     Here 3/1 with shock but febrile too.   He has no recollection of what happened.   Improved after empiric Vanc and Cefepime but I don't know what we treated.   Blood, urine and sputum cultures negative and CT chest abdomen unrevealing.   Right shin ulcer but no obvious cellulitis.     Developed a pruritic rash over his torso around 3/4-5.   Derm suspects Vancomycin drug rash.   Doubt reaction to Ceftriaxone. Tolerated 6 weeks in 2020 and Cefepime this admission.   Unclear Zosyn reaction, reportedly had itching in ED on 12/12/2022 but he doesn't recall.   Resolved.   Outpatient allergy evaluation     Left leg erythema with bullae developed also around 3/4-5.   Derm suspects resolving bullous cellulitis.   I don't think he would've developed a cellulitis while on antibiotics.   It's red but not hot or tender.   No pus.   Favor venous stasis but can try Ancef 1GM q8h. I think it's safe to attempt and discussed this with patient.   Monitor for rash.     Clinically well, nontoxic. I'm not concerned about the fluctuating leukocytosis in itself.     Discussed with medicine     Sin Echeverria MD   Infectious Disease   Available on TEAMS. After 5PM and on weekends please page fellow on call or call [phone]

## 2023-03-09 NOTE — PROGRESS NOTE ADULT - SUBJECTIVE AND OBJECTIVE BOX
Follow Up: erythema     Interval History/ROS: Afebrile, no chills, feels fine. No pain to his legs. Called back for rising leukocytosis and worsening erythema left leg.     Allergies  ceftriaxone (Rash)  Zosyn (Pruritus)        ANTIMICROBIALS:      OTHER MEDS:  acetaminophen     Tablet .. 650 milliGRAM(s) Oral every 6 hours PRN  acetaminophen     Tablet .. 650 milliGRAM(s) Oral every 6 hours PRN  acetaminophen  Suppository .. 650 milliGRAM(s) Rectal once PRN  albuterol    90 MICROgram(s) HFA Inhaler 2 Puff(s) Inhalation three times a day PRN  ALPRAZolam 0.25 milliGRAM(s) Oral two times a day PRN  ammonium lactate 12% Lotion 1 Application(s) Topical two times a day  aspirin  chewable 81 milliGRAM(s) Oral daily  atorvastatin 80 milliGRAM(s) Oral at bedtime  chlorhexidine 4% Liquid 1 Application(s) Topical <User Schedule>  clopidogrel Tablet 75 milliGRAM(s) Oral daily  dextrose 50% Injectable 25 Gram(s) IV Push every 15 minutes PRN  diphenhydrAMINE 50 milliGRAM(s) Oral every 4 hours PRN  heparin   Injectable 5000 Unit(s) SubCutaneous every 8 hours  insulin glargine Injectable (LANTUS) 40 Unit(s) SubCutaneous at bedtime  insulin lispro (ADMELOG) corrective regimen sliding scale   SubCutaneous Before meals and at bedtime  metoprolol succinate ER 50 milliGRAM(s) Oral daily  nicotine -   7 mG/24Hr(s) Patch 1 Patch Transdermal daily  pantoprazole    Tablet 40 milliGRAM(s) Oral before breakfast  tamsulosin 0.4 milliGRAM(s) Oral at bedtime  triamcinolone 0.1% Cream 1 Application(s) Topical every 12 hours      Vital Signs Last 24 Hrs  T(C): 37 (09 Mar 2023 08:39), Max: 37 (09 Mar 2023 08:39)  T(F): 98.6 (09 Mar 2023 08:39), Max: 98.6 (09 Mar 2023 08:39)  HR: 92 (09 Mar 2023 08:39) (92 - 96)  BP: 95/60 (09 Mar 2023 08:39) (93/55 - 98/51)  BP(mean): --  RR: 18 (09 Mar 2023 08:39) (18 - 18)  SpO2: 96% (09 Mar 2023 08:39) (94% - 96%)    Parameters below as of 09 Mar 2023 08:39  Patient On (Oxygen Delivery Method): room air        Physical Exam:  General: non toxic, obese   Cardio: regular rate   Respiratory: nonlabored on room air   abd: nondistended  Musculoskeletal: no focal joint swelling, no edema  vascular: no phlebitis   Skin: more confluent bright red erythema proximal to left lower leg ruptured blister but not hot or tender                          12.5   18.45 )-----------( 229      ( 09 Mar 2023 07:21 )             40.0       03-    130<L>  |  88<L>  |  128<H>  ----------------------------<  192<H>  3.5   |  24  |  2.45<H>    Ca    9.6      09 Mar 2023 07:21    TPro  7.2  /  Alb  3.3  /  TBili  0.5  /  DBili  x   /  AST  31  /  ALT  36  /  AlkPhos  292<H>        Urinalysis Basic - ( 08 Mar 2023 06:39 )    Color: Light Yellow / Appearance: Clear / S.012 / pH: x  Gluc: x / Ketone: Negative  / Bili: Negative / Urobili: Negative   Blood: x / Protein: 30 mg/dL / Nitrite: Negative   Leuk Esterase: Negative / RBC: 17 /hpf / WBC 4 /HPF   Sq Epi: x / Non Sq Epi: 0 /hpf / Bacteria: Negative        MICROBIOLOGY:  Culture - Sputum (collected 23 @ 01:02)  Source: .Sputum Sputum  Gram Stain (23 @ 09:27):    Few polymorphonuclear leukocytes seen per low power field    Rare Squamous epithelial cells seen per low power field    No organisms seen  Final Report (23 @ 09:53):    Normal Respiratory Dorothy present    RADIOLOGY:  Images below reviewed personally  CT Lower Extremity No Cont, Bilateral (23 @ 12:29)   Right lower extremity: Findings suggestive of cellulitis with extensive   circumferential subcutaneous edema about the tibia and fibula extending   to the foot. No subcutaneous air. No CT evidence of osteomyelitis.  Left lower extremity: Findings suggestive of cellulitis with extensive   circumferential subcutaneous edema about the tibia and fibula extending   to the foot. No subcutaneous air. No CT evidence of osteomyelitis.

## 2023-03-09 NOTE — PROGRESS NOTE ADULT - ASSESSMENT
Patient is a 62 year old male with PMHx of CHFrEF more recently 29%, has AICD, cardiomyopathy due to ischemia, Diabetes, COPD who was referred to hospital due to concerns for septic shock.    1) Cellulitis LLE  - Increasing WBC, some erythema and warmth  - wound care following  - Spoke to ID, will start cefazolin and monitor. Do not suspect true cephalosporin allergy      2) acute on chronic kidney injury  - stabilized, improving  - Will discuss with nephro resuming oral diuretic today.    3) CHFrEF  Repeat echo with ef 25  - Resumed metoprolol  - diuretics per nephro  - Please consult Heart failure team, managed in the past.    4) DMII  - getting better controlled  Increased basal insulin 40 units  Monitor insulin requirements tomorrow, will dose prandial insulin    5) COPD  - Controlled with ERVIN prn  - Few symptoms, minimal exacerbations, and no recent hospitalizations due to exacerbation  - nicotine patches and gum as needed  - No wheezing on exam, however now hypoxia- probably due to chf    6) Right Hydronephrosis  - no evidence of stone, more consistent with chronic outlet obstruction  - Routine bladder scan avoid urinary retention.  - considering adding finasteride to regimen      7) Anxiety  - controlled with low dose Xanax at home 0.25mg  - dc dilaudid    8) hyponatremia  - continue hold Diuretics  - appreciate nephro f.u

## 2023-03-09 NOTE — PROGRESS NOTE ADULT - ATTENDING COMMENTS
Drug exanthem significantly improved. May use triamcinolone prn itch. Skin may peel in sheets as it heals (similar to a sunburn as it heals). Suspected resolving cellulitis on the left leg appears stable. Do not favor active infection based on exam today. Will defer abx management to ID and primary team. Erosion may take several weeks to months to heal.     Shayan Day MD, PharmD, MPH  Co-Director, Inpatient Dermatology Consultation Service, [facility]

## 2023-03-09 NOTE — PROGRESS NOTE ADULT - ASSESSMENT
62 year old male with pmh of HFrEF with an EF of 19% status post AICD, CAD status post stents, DM2, HTN, COPD with a 30+ pack year history of smoking presents to the ED with shortness of breath.       1- MANJIT on CKD i,e ATN   2- sepsis  3- CHF  4- DM2  5- chronic R hydro  6- Left leg cellulitis  7- rash     MANJIT on ckd III in setting of sepsis/fevers/hypotension   has hx of cardiomyopathy with ef < 20 %    suspect possible cephalosporin rash on thorax   on meropenem now  cont    cr is still high  but improving   bun remains high   hyponatremia is improving   restart bumex 2 mg today and monitor response of cr and bun   trend bun   insulin s-s-   strict I/O  pan culture  NGTD

## 2023-03-09 NOTE — PROGRESS NOTE ADULT - ASSESSMENT
Assessment: Favor resolving/resolved bullous cellulitis with erosion and underlying venous stasis on the LLE. Favor exanthematous drug eruption likely 2/2 vancomycin on the trunk and extremities, resolving     Plan:   - Recommend triamcinolone 0.1% CREAM applied to affected areas BID for two weeks on, one week off   - Rash on left lower extremity appears consistent with a resolving bullous cellulitis, agree with holding off on antibiotics per ID   - Would not recommend unroofing intact bullae as this may increase risk of infection     The patient's chart was reviewed in addition to being seen and examined at bedside with the dermatology attending Dr. Day.  Recommendations were communicated with the primary team.    Dermatology to sign off at this time. Please notify us and re-consult as needed for new or concerning changes to skin exam.    Thank you for allowing us to participate in the care of this patient.    Vanesa Pedro MD, RACHEAL  Resident Physician, PGY-2  [facility]  Pager: [phone]  Office: [phone] Assessment: Favor resolving/resolved bullous cellulitis with erosion and underlying venous stasis on the LLE. Favor exanthematous drug eruption likely 2/2 vancomycin on the trunk and extremities, resolving     Plan:   - Recommend triamcinolone 0.1% CREAM applied to affected areas BID for two weeks on, one week off   - Rash on left lower extremity appears consistent with a resolving bullous cellulitis, would defer abx treatment to ID    - Would not recommend unroofing intact bullae as this may increase risk of infection     The patient's chart was reviewed in addition to being seen and examined at bedside with the dermatology attending Dr. Day.  Recommendations were communicated with the primary team.    Dermatology to sign off at this time. Please notify us and re-consult as needed for new or concerning changes to skin exam.    Thank you for allowing us to participate in the care of this patient.    Vanesa Pedro MD, RACHEAL  Resident Physician, PGY-2  [facility]  Pager: [phone]  Office: [phone] Assessment: Favor resolving/resolved bullous cellulitis with erosion and underlying venous stasis on the LLE. Favor exanthematous drug eruption likely 2/2 vancomycin on the trunk and extremities, resolving     Plan:   - Recommend triamcinolone 0.1% CREAM applied to affected areas BID for two weeks on, one week off   - Rash on left lower extremity appears consistent with a resolving bullous cellulitis, would defer abx treatment to ID      The patient's chart was reviewed in addition to being seen and examined at bedside with the dermatology attending Dr. Day.  Recommendations were communicated with the primary team.    Dermatology to sign off at this time. Please notify us and re-consult as needed for new or concerning changes to skin exam.    Thank you for allowing us to participate in the care of this patient.    Vanesa Pedro MD, RACHEAL  Resident Physician, PGY-2  [facility]  Pager: [phone]  Office: [phone]

## 2023-03-10 ENCOUNTER — TRANSCRIPTION ENCOUNTER (OUTPATIENT)
Age: 63
End: 2023-03-10

## 2023-03-10 LAB
ANION GAP SERPL CALC-SCNC: 18 MMOL/L — HIGH (ref 5–17)
BUN SERPL-MCNC: 122 MG/DL — HIGH (ref 7–23)
CALCIUM SERPL-MCNC: 9.7 MG/DL — SIGNIFICANT CHANGE UP (ref 8.4–10.5)
CHLORIDE SERPL-SCNC: 89 MMOL/L — LOW (ref 96–108)
CO2 SERPL-SCNC: 25 MMOL/L — SIGNIFICANT CHANGE UP (ref 22–31)
CREAT SERPL-MCNC: 2.12 MG/DL — HIGH (ref 0.5–1.3)
EGFR: 35 ML/MIN/1.73M2 — LOW
GLUCOSE BLDC GLUCOMTR-MCNC: 251 MG/DL — HIGH (ref 70–99)
GLUCOSE BLDC GLUCOMTR-MCNC: 262 MG/DL — HIGH (ref 70–99)
GLUCOSE BLDC GLUCOMTR-MCNC: 263 MG/DL — HIGH (ref 70–99)
GLUCOSE BLDC GLUCOMTR-MCNC: 270 MG/DL — HIGH (ref 70–99)
GLUCOSE BLDC GLUCOMTR-MCNC: 278 MG/DL — HIGH (ref 70–99)
GLUCOSE SERPL-MCNC: 300 MG/DL — HIGH (ref 70–99)
HCT VFR BLD CALC: 42.5 % — SIGNIFICANT CHANGE UP (ref 39–50)
HGB BLD-MCNC: 13.1 G/DL — SIGNIFICANT CHANGE UP (ref 13–17)
MCHC RBC-ENTMCNC: 23.6 PG — LOW (ref 27–34)
MCHC RBC-ENTMCNC: 30.8 GM/DL — LOW (ref 32–36)
MCV RBC AUTO: 76.7 FL — LOW (ref 80–100)
NRBC # BLD: 0 /100 WBCS — SIGNIFICANT CHANGE UP (ref 0–0)
PLATELET # BLD AUTO: 243 K/UL — SIGNIFICANT CHANGE UP (ref 150–400)
POTASSIUM SERPL-MCNC: 3.5 MMOL/L — SIGNIFICANT CHANGE UP (ref 3.5–5.3)
POTASSIUM SERPL-SCNC: 3.5 MMOL/L — SIGNIFICANT CHANGE UP (ref 3.5–5.3)
RBC # BLD: 5.54 M/UL — SIGNIFICANT CHANGE UP (ref 4.2–5.8)
RBC # FLD: 18 % — HIGH (ref 10.3–14.5)
SODIUM SERPL-SCNC: 132 MMOL/L — LOW (ref 135–145)
WBC # BLD: 13.56 K/UL — HIGH (ref 3.8–10.5)
WBC # FLD AUTO: 13.56 K/UL — HIGH (ref 3.8–10.5)

## 2023-03-10 PROCEDURE — 99232 SBSQ HOSP IP/OBS MODERATE 35: CPT

## 2023-03-10 RX ORDER — INSULIN LISPRO 100/ML
5 VIAL (ML) SUBCUTANEOUS
Refills: 0 | Status: DISCONTINUED | OUTPATIENT
Start: 2023-03-10 | End: 2023-03-11

## 2023-03-10 RX ORDER — BUMETANIDE 0.25 MG/ML
2 INJECTION INTRAMUSCULAR; INTRAVENOUS DAILY
Refills: 0 | Status: DISCONTINUED | OUTPATIENT
Start: 2023-03-10 | End: 2023-03-11

## 2023-03-10 RX ORDER — BUMETANIDE 0.25 MG/ML
1 INJECTION INTRAMUSCULAR; INTRAVENOUS
Qty: 0 | Refills: 0 | DISCHARGE
Start: 2023-03-10

## 2023-03-10 RX ORDER — METOPROLOL TARTRATE 50 MG
50 TABLET ORAL DAILY
Refills: 0 | Status: DISCONTINUED | OUTPATIENT
Start: 2023-03-11 | End: 2023-03-11

## 2023-03-10 RX ORDER — BUMETANIDE 0.25 MG/ML
2 INJECTION INTRAMUSCULAR; INTRAVENOUS DAILY
Refills: 0 | Status: DISCONTINUED | OUTPATIENT
Start: 2023-03-10 | End: 2023-03-10

## 2023-03-10 RX ORDER — LANOLIN ALCOHOL/MO/W.PET/CERES
3 CREAM (GRAM) TOPICAL ONCE
Refills: 0 | Status: COMPLETED | OUTPATIENT
Start: 2023-03-10 | End: 2023-03-10

## 2023-03-10 RX ADMIN — Medication 100 MILLIGRAM(S): at 05:17

## 2023-03-10 RX ADMIN — CHLORHEXIDINE GLUCONATE 1 APPLICATION(S): 213 SOLUTION TOPICAL at 05:13

## 2023-03-10 RX ADMIN — Medication 81 MILLIGRAM(S): at 11:11

## 2023-03-10 RX ADMIN — Medication 650 MILLIGRAM(S): at 22:19

## 2023-03-10 RX ADMIN — HEPARIN SODIUM 5000 UNIT(S): 5000 INJECTION INTRAVENOUS; SUBCUTANEOUS at 17:00

## 2023-03-10 RX ADMIN — Medication 1 APPLICATION(S): at 05:13

## 2023-03-10 RX ADMIN — Medication 6: at 08:45

## 2023-03-10 RX ADMIN — ATORVASTATIN CALCIUM 80 MILLIGRAM(S): 80 TABLET, FILM COATED ORAL at 21:36

## 2023-03-10 RX ADMIN — Medication 1 PATCH: at 11:11

## 2023-03-10 RX ADMIN — Medication 6: at 12:29

## 2023-03-10 RX ADMIN — Medication 50 MILLIGRAM(S): at 05:16

## 2023-03-10 RX ADMIN — HEPARIN SODIUM 5000 UNIT(S): 5000 INJECTION INTRAVENOUS; SUBCUTANEOUS at 02:09

## 2023-03-10 RX ADMIN — Medication 1 PATCH: at 19:00

## 2023-03-10 RX ADMIN — Medication 6: at 21:48

## 2023-03-10 RX ADMIN — Medication 650 MILLIGRAM(S): at 21:49

## 2023-03-10 RX ADMIN — TAMSULOSIN HYDROCHLORIDE 0.4 MILLIGRAM(S): 0.4 CAPSULE ORAL at 21:35

## 2023-03-10 RX ADMIN — HEPARIN SODIUM 5000 UNIT(S): 5000 INJECTION INTRAVENOUS; SUBCUTANEOUS at 08:46

## 2023-03-10 RX ADMIN — Medication 1 APPLICATION(S): at 17:00

## 2023-03-10 RX ADMIN — Medication 3 MILLIGRAM(S): at 02:08

## 2023-03-10 RX ADMIN — BUMETANIDE 2 MILLIGRAM(S): 0.25 INJECTION INTRAMUSCULAR; INTRAVENOUS at 17:53

## 2023-03-10 RX ADMIN — PANTOPRAZOLE SODIUM 40 MILLIGRAM(S): 20 TABLET, DELAYED RELEASE ORAL at 05:18

## 2023-03-10 RX ADMIN — INSULIN GLARGINE 40 UNIT(S): 100 INJECTION, SOLUTION SUBCUTANEOUS at 21:48

## 2023-03-10 RX ADMIN — Medication 100 MILLIGRAM(S): at 17:02

## 2023-03-10 RX ADMIN — CLOPIDOGREL BISULFATE 75 MILLIGRAM(S): 75 TABLET, FILM COATED ORAL at 11:11

## 2023-03-10 RX ADMIN — Medication 1 PATCH: at 06:41

## 2023-03-10 RX ADMIN — Medication 1 APPLICATION(S): at 16:58

## 2023-03-10 RX ADMIN — Medication 5 UNIT(S): at 17:01

## 2023-03-10 RX ADMIN — Medication 1 PATCH: at 11:47

## 2023-03-10 RX ADMIN — Medication 6: at 17:01

## 2023-03-10 RX ADMIN — Medication 100 MILLIGRAM(S): at 21:35

## 2023-03-10 RX ADMIN — Medication 5 UNIT(S): at 12:30

## 2023-03-10 NOTE — PROGRESS NOTE ADULT - SUBJECTIVE AND OBJECTIVE BOX
[facility]  Lul Cruz  Office: [phone]  Cell: [phone]  Can also be reached on [employer] Teams       INTERVAL HPI/OVERNIGHT EVENTS: No new events. Started on cefazolin yesterday, erythema of left leg appears much less bright. Still not having any pain. Need wound care at home. Breathing stable, no chest pain or palpitations.      REVIEW OF SYSTEMS:  Negative except per HPI    Vital Signs Last 24 Hrs  T(C): 37 (10 Mar 2023 08:45), Max: 37 (10 Mar 2023 08:45)  T(F): 98.6 (10 Mar 2023 08:45), Max: 98.6 (10 Mar 2023 08:45)  HR: 94 (10 Mar 2023 08:45) (94 - 94)  BP: 93/57 (10 Mar 2023 10:58) (93/57 - 96/58)  BP(mean): --  RR: 18 (10 Mar 2023 10:57) (18 - 18)  SpO2: 96% (10 Mar 2023 10:57) (96% - 96%)    Parameters below as of 10 Mar 2023 10:57  Patient On (Oxygen Delivery Method): room air        PHYSICAL EXAMINATION:  GENERAL: NAD, well built  HEAD:  Atraumatic, Normocephalic  EYES:  conjunctiva and sclera clear  NECK: Supple, No JVD, Normal thyroid  CHEST/LUNG: Diminished breath sounds some mild rhonchi  HEART: Regular rate and rhythm; tachycardic  ABDOMEN: Increasing in size, more firm today  NERVOUS SYSTEM:  Alert & Oriented X3,    EXTREMITIES:  left lower ext less red than prior.                                     13.1   13.56 )-----------( 243      ( 10 Mar 2023 06:22 )             42.5     03-10    132<L>  |  89<L>  |  122<H>  ----------------------------<  300<H>  3.5   |  25  |  2.12<H>    Ca    9.7      10 Mar 2023 06:22                CAPILLARY BLOOD GLUCOSE      RADIOLOGY & ADDITIONAL TESTS:

## 2023-03-10 NOTE — DISCHARGE NOTE PROVIDER - HOSPITAL COURSE
Transfer note: 62 year old male with pmh of HFrEF with an EF of 25% status post AICD, CAD status post stents, DM 2, HTN, COPD with a 30+ pack year history of smoking presents to the ED with shortness of breath, vomiting, diarrhea, hypoxia to mid 80s without NC --> 99% on 1L NC, hypotension requiring pressors in setting of fever admitted to MICU for septic shock. Patient was continued on cefepime and vancomycin. Was weaned off levophed 3/3. CT CAP without PNA, small pleural effusion, no infectious focus in abdomen. CT with hydronephrosis, worsened from previous CT 2022 but no obstructing stone. U/A and blood cultures negative. Cellulitis LLE newly found on 3/4 and patient was de-escalated to ceftriaxone. ID following. CT LE with diffuse subcutaneous edema and cellulitis, CK level elevated > 300. Pt developed a new blanching diffuse rash and worsening cellulitis 3/5 possible 2/2 to CTX, switched to vanc/hetal.   MICU stay complicated by worsening renal function (Cr 4.8) and decreasing urine output likely i/s/o cardiorenal. POCUS with volume overload, patient was started on bumex gtt with metolazone with improving urine output. Bumex gtt discontinued 3/6.     Patient was downgraded to medicine after being off pressors. Blood pressure remained at his borderline, only able to restart GDMT metoprolol. COntinued to hold all the other agents due to borderline blood pressures. While on medicine floors, he was observed off antibiotics. Derm consulted thought it was improving bullous cellulitits. However increased WBC and increased redness of left lower ext, ID reconsulted, started on cefazolin with improvement in leukocytosis. Had maintained defervescence while on medicine floor. Renal function gradually improved. Had hyponatremia and was monitored off of diuretics. Diuretics eventually was restarted with good tolerance.     Patient is stable for discharge per attending and is advised to follow up with PCP as outpatient  Please refer to patient's complete medical chart with documents for a full hospital course, for this is only a brief summary. 62 year old male with pmh of HFrEF with an EF of 25% status post AICD, CAD status post stents, DM 2, HTN, COPD with a 30+ pack year history of smoking presents to the ED with shortness of breath, vomiting, diarrhea, hypoxia to mid 80s without NC --> 99% on 1L NC, hypotension requiring pressors in setting of fever admitted to MICU for septic shock. Patient was continued on cefepime and vancomycin. Was weaned off levophed 3/3. CT CAP without PNA, small pleural effusion, no infectious focus in abdomen. CT with hydronephrosis, worsened from previous CT 2022 but no obstructing stone. U/A and blood cultures negative. Cellulitis LLE newly found on 3/4 and patient was de-escalated to ceftriaxone. ID following. CT LE with diffuse subcutaneous edema and cellulitis, CK level elevated > 300. Pt developed a new blanching diffuse rash and worsening cellulitis 3/5 possible 2/2 to CTX, switched to vanc/hetal.   MICU stay complicated by worsening renal function (Cr 4.8) and decreasing urine output likely i/s/o cardiorenal. POCUS with volume overload, patient was started on bumex gtt with metolazone with improving urine output. Bumex gtt discontinued 3/6.     Patient was downgraded to medicine after being off pressors. Blood pressure remained at his borderline, only able to restart GDMT metoprolol. COntinued to hold all the other agents due to borderline blood pressures. While on medicine floors, he was observed off antibiotics. Derm consulted thought it was improving bullous cellulitits. However increased WBC and increased redness of left lower ext, ID reconsulted, started on cefazolin with improvement in leukocytosis. Had maintained defervescence while on medicine floor. Renal function gradually improved. Had hyponatremia and was monitored off of diuretics. Diuretics eventually was restarted with good tolerance.     Patient is stable for discharge per attending and is advised to follow up with PCP as outpatient  Please refer to patient's complete medical chart with documents for a full hospital course, for this is only a brief summary. 62 year old male with pmh of HFrEF with an EF of 25% status post AICD, CAD status post stents, DM 2, HTN, COPD with a 30+ pack year history of smoking presents to the ED with shortness of breath, vomiting, diarrhea, hypoxia to mid 80s without NC --> 99% on 1L NC, hypotension requiring pressors in setting of fever admitted to MICU for septic shock. Patient was continued on cefepime and vancomycin. Was weaned off levophed 3/3. CT CAP without PNA, small pleural effusion, no infectious focus in abdomen. CT with hydronephrosis, worsened from previous CT 2022 but no obstructing stone. U/A and blood cultures negative. Cellulitis LLE newly found on 3/4 and patient was de-escalated to ceftriaxone. ID following. CT LE with diffuse subcutaneous edema and cellulitis, CK level elevated > 300. Pt developed a new blanching diffuse rash and worsening cellulitis 3/5 possible 2/2 to CTX, switched to vanc/hetal.   MICU stay complicated by worsening renal function (Cr 4.8) and decreasing urine output likely i/s/o cardiorenal. POCUS with volume overload, patient was started on bumex gtt with metolazone with improving urine output. Bumex gtt discontinued 3/6.     Patient was downgraded to medicine after being off pressors. Blood pressure remained at his borderline, only able to restart GDMT metoprolol. COntinued to hold all the other agents due to borderline blood pressures. While on medicine floors, he was observed off antibiotics. Derm consulted thought it was improving bullous cellulitits. However increased WBC and increased redness of left lower ext, ID reconsulted, started on cefazolin with improvement in leukocytosis. Had maintained defervescence while on medicine floor. Renal function gradually improved. Had hyponatremia and was monitored off of diuretics. Diuretics eventually was restarted with good tolerance.     Patient is stable for discharge per attending Dr. Cruz and is advised to follow up with PCP as outpatient  Please refer to patient's complete medical chart with documents for a full hospital course, for this is only a brief summary. 62 year old male with pmh of HFrEF with an EF of 25% status post AICD, CAD status post stents, DM 2, HTN, COPD with a 30+ pack year history of smoking presents to the ED with shortness of breath, vomiting, diarrhea, hypoxia to mid 80s without NC --> 99% on 1L NC, hypotension requiring pressors in setting of fever admitted to MICU for septic shock. Patient was continued on cefepime and vancomycin. Was weaned off levophed 3/3. CT CAP without PNA, small pleural effusion, no infectious focus in abdomen. CT with hydronephrosis, worsened from previous CT 2022 but no obstructing stone. U/A and blood cultures negative. Cellulitis LLE newly found on 3/4 and patient was de-escalated to ceftriaxone. ID following. CT LE with diffuse subcutaneous edema and cellulitis, CK level elevated > 300. Pt developed a new blanching diffuse rash and worsening cellulitis 3/5 possible 2/2 to CTX, switched to vanc/hetal.   MICU stay complicated by worsening renal function (Cr 4.8) and decreasing urine output likely i/s/o cardiorenal. POCUS with volume overload, patient was started on bumex gtt with metolazone with improving urine output. Bumex gtt discontinued 3/6.     Patient was downgraded to medicine after being off pressors. Blood pressure remained at his borderline, only able to restart GDMT metoprolol. COntinued to hold all the other agents due to borderline blood pressures. While on medicine floors, he was observed off antibiotics. Derm consulted thought it was improving bullous cellulitits. However increased WBC and increased redness of left lower ext, ID reconsulted, started on cefazolin with improvement in leukocytosis. Had maintained defervescence while on medicine floor. Renal function gradually improved. Had hyponatremia and was monitored off of diuretics. Diuretics eventually was restarted with good tolerance.   Today, K 3.3 Renal given 20meq q2H 2 doses.     Patient is stable for discharge per attending Dr. Cruz and is advised to follow up with PCP as outpatient  Please refer to patient's complete medical chart with documents for a full hospital course, for this is only a brief summary.

## 2023-03-10 NOTE — DISCHARGE NOTE PROVIDER - NSDCMRMEDTOKEN_GEN_ALL_CORE_FT
albuterol 90 mcg/inh inhalation aerosol: 1 puff(s) inhaled 3 times a day, As needed, Shortness of Breath and/or Wheezing  aspirin 81 mg oral tablet, chewable: 1 tab(s) orally once a day  bumetanide 1 mg oral tablet: 2 tab(s) orally 3 times a day  bumetanide 2 mg oral tablet: 1 tab(s) orally once a day  clopidogrel 75 mg oral tablet: 1 tab(s) orally once a day  Entresto 24 mg-26 mg oral tablet: 1 tab(s) orally 2 times a day  insulin glargine 100 units/mL subcutaneous solution: 40 unit(s) subcutaneous once a day (at bedtime)  Jardiance 10 mg oral tablet: 1 tab(s) orally once a day   Metoprolol Succinate  mg oral tablet, extended release: 1 tab(s) orally once a day  pantoprazole 40 mg oral delayed release tablet: 1 tab(s) orally once a day (before a meal)  rosuvastatin 20 mg oral tablet: 1 tab(s) orally once a day  spironolactone 25 mg oral tablet: 1 tab(s) orally once a day  tamsulosin 0.4 mg oral capsule: 1 cap(s) orally once a day   albuterol 90 mcg/inh inhalation aerosol: 1 puff(s) inhaled 3 times a day, As needed, Shortness of Breath and/or Wheezing  aspirin 81 mg oral tablet, chewable: 1 tab(s) orally once a day  bumetanide 1 mg oral tablet: 2 tab(s) orally 3 times a day  bumetanide 2 mg oral tablet: 1 tab(s) orally once a day  clopidogrel 75 mg oral tablet: 1 tab(s) orally once a day  Entresto 24 mg-26 mg oral tablet: 1 tab(s) orally 2 times a day  insulin glargine 100 units/mL subcutaneous solution: 40 unit(s) subcutaneous once a day (at bedtime)  Jardiance 10 mg oral tablet: 1 tab(s) orally once a day   Metoprolol Succinate  mg oral tablet, extended release: 1 tab(s) orally once a day  pantoprazole 40 mg oral delayed release tablet: 1 tab(s) orally once a day (before a meal)  rosuvastatin 20 mg oral tablet: 1 tab(s) orally once a day  spironolactone 25 mg oral tablet: 1 tab(s) orally once a day  tamsulosin 0.4 mg oral capsule: 1 cap(s) orally once a day  Walker: 1 each  once a day   For Unstable Gait   bumetanide 2 mg oral tablet: 1 tab(s) orally once a day  insulin glargine 100 units/mL subcutaneous solution: 40 unit(s) subcutaneous once a day (at bedtime)  Jardiance 10 mg oral tablet: 1 tab(s) orally once a day   Metoprolol Succinate  mg oral tablet, extended release: 1 tab(s) orally once a day  pantoprazole 40 mg oral delayed release tablet: 1 tab(s) orally once a day (before a meal)  rosuvastatin 20 mg oral tablet: 1 tab(s) orally once a day  tamsulosin 0.4 mg oral capsule: 1 cap(s) orally once a day

## 2023-03-10 NOTE — DISCHARGE NOTE PROVIDER - NSDCCAREPROVSEEN_GEN_ALL_CORE_FT
Payton, Yann Orona, Vandana Roa, Nba Reyes, Tomas Calles, Jonas Dong, Katherine Avendaño, Monty Irby, Russ ENCISO, IAN Hinton, Annabel Echeverria, Sin Pedro, Vanesa Hubbard, Georgiana Obrien, Robyn HERNANDEZ

## 2023-03-10 NOTE — DISCHARGE NOTE PROVIDER - CARE PROVIDER_API CALL
Lul Cruz (DO)  Medicine  [address] [place] [address], [address] [place] [address]  Phone: [phone]  Fax: [phone]  Follow Up Time:    Lul Cruz (DO)  Medicine  [address] [place] [address], [address] [place] [address]  Phone: [phone]  Fax: [phone]  Follow Up Time: 2 weeks

## 2023-03-10 NOTE — PROGRESS NOTE ADULT - ASSESSMENT
Patient is a 62 year old male with PMHx of CHFrEF more recently 29%, has AICD, cardiomyopathy due to ischemia, Diabetes, COPD who was referred to hospital due to concerns for septic shock.    1) Cellulitis LLE  leukocytosis improving, redness improving after starting ancef  Complete a 1-week course of ancef    2) acute on chronic kidney injury  - stabilized, improving  - Resumed oral diuretics yesterday, will continue today.    3) CHFrEF  Repeat echo with ef 25  - Resumed metoprolol  - diuretics per nephro  - Will have patient follow up heart failure at patient's discretion    4) DMII  a1c better but recent glucose levels high. Likely in the setting of improved renal function and improved diet.  Increased basal insulin 40 units  Started prandial insulin 5units TID  Will instruct patient to [facility] blood glucose log at home for monitoring.    5) COPD  - Controlled with ERVIN prn  - Few symptoms, minimal exacerbations, and no recent hospitalizations due to exacerbation  - nicotine patches and gum as needed  - No wheezing on exam, however now hypoxia- probably due to chf    6) Right Hydronephrosis  - no evidence of stone, more consistent with chronic outlet obstruction  - Routine bladder scan avoid urinary retention.  - will considering adding finasteride to regimen      7) Anxiety  - controlled with low dose Xanax at home 0.25mg  - dc dilaudid    8) hyponatremia  iimproving  - appreciate nephro f.u

## 2023-03-10 NOTE — PROGRESS NOTE ADULT - ASSESSMENT
62 year old male with pmh of HFrEF with an EF of 19% status post AICD, CAD status post stents, DM2, HTN, COPD with a 30+ pack year history of smoking presents to the ED with shortness of breath.       1- MANJIT on CKD i,e ATN   2- sepsis  3- CHF  4- DM2  5- chronic R hydro  6- Left leg cellulitis  7- rash     MANJIT on ckd III in setting of sepsis/fevers/hypotension   has hx of cardiomyopathy with ef < 20 %    suspect possible cephalosporin rash on thorax   on meropenem now  cont    cr is still high  but improving towards baseline.   bun remains high but also slowly improving   restart bumex 2 mg  bid    insulin s-s-   strict I/O  pan culture  NGTD

## 2023-03-10 NOTE — PROGRESS NOTE ADULT - SUBJECTIVE AND OBJECTIVE BOX
Follow Up: fever shock    Interval History/ROS: Feels fine. No pain to his legs. No chills or diarrhea.     Allergies  ceftriaxone (Rash)  vancomycin (Rash (Mild to Mod))  Zosyn (Pruritus)        ANTIMICROBIALS:  ceFAZolin   IVPB    ceFAZolin   IVPB 1000 every 8 hours      OTHER MEDS:  acetaminophen     Tablet .. 650 milliGRAM(s) Oral every 6 hours PRN  acetaminophen     Tablet .. 650 milliGRAM(s) Oral every 6 hours PRN  acetaminophen  Suppository .. 650 milliGRAM(s) Rectal once PRN  albuterol    90 MICROgram(s) HFA Inhaler 2 Puff(s) Inhalation three times a day PRN  ALPRAZolam 0.25 milliGRAM(s) Oral two times a day PRN  ammonium lactate 12% Lotion 1 Application(s) Topical two times a day  aspirin  chewable 81 milliGRAM(s) Oral daily  atorvastatin 80 milliGRAM(s) Oral at bedtime  buMETAnide 2 milliGRAM(s) Oral daily  chlorhexidine 4% Liquid 1 Application(s) Topical <User Schedule>  clopidogrel Tablet 75 milliGRAM(s) Oral daily  dextrose 50% Injectable 25 Gram(s) IV Push every 15 minutes PRN  diphenhydrAMINE 50 milliGRAM(s) Oral every 4 hours PRN  heparin   Injectable 5000 Unit(s) SubCutaneous every 8 hours  insulin glargine Injectable (LANTUS) 40 Unit(s) SubCutaneous at bedtime  insulin lispro (ADMELOG) corrective regimen sliding scale   SubCutaneous Before meals and at bedtime  insulin lispro Injectable (ADMELOG) 5 Unit(s) SubCutaneous three times a day before meals  nicotine -   7 mG/24Hr(s) Patch 1 Patch Transdermal daily  pantoprazole    Tablet 40 milliGRAM(s) Oral before breakfast  tamsulosin 0.4 milliGRAM(s) Oral at bedtime  triamcinolone 0.1% Cream 1 Application(s) Topical every 12 hours      Vital Signs Last 24 Hrs  T(C): 37 (10 Mar 2023 08:45), Max: 37 (10 Mar 2023 08:45)  T(F): 98.6 (10 Mar 2023 08:45), Max: 98.6 (10 Mar 2023 08:45)  HR: 94 (10 Mar 2023 08:45) (88 - 94)  BP: 93/57 (10 Mar 2023 10:58) (93/57 - 103/67)  BP(mean): --  RR: 18 (10 Mar 2023 10:57) (18 - 18)  SpO2: 96% (10 Mar 2023 10:57) (96% - 96%)    Parameters below as of 10 Mar 2023 10:57  Patient On (Oxygen Delivery Method): room air        Physical Exam:  General: non toxic, alert, obese  Cardio: regular rate   Respiratory: nonlabored on room air   abd: nondistended, soft nontender   Musculoskeletal: no focal joint swelling   vascular: no phlebitis   Skin: both lower leg dressed in ACE bandages. left lower leg persistent erythema without increased warmth or tenderness, no pus. stable ruptured bullae.                           13.1   13.56 )-----------( 243      ( 10 Mar 2023 06:22 )             42.5       03-10    132<L>  |  89<L>  |  122<H>  ----------------------------<  300<H>  3.5   |  25  |  2.12<H>    Ca    9.7      10 Mar 2023 06:22            MICROBIOLOGY:  Culture - Sputum (collected 03-06-23 @ 01:02)  Source: .Sputum Sputum  Gram Stain (03-06-23 @ 09:27):    Few polymorphonuclear leukocytes seen per low power field    Rare Squamous epithelial cells seen per low power field    No organisms seen  Final Report (03-08-23 @ 09:53):    Normal Respiratory Dorothy present    RADIOLOGY:  Images below reviewed personally  CT Lower Extremity No Cont, Bilateral (03.05.23 @ 12:29)   Right lower extremity: Findings suggestive of cellulitis with extensive   circumferential subcutaneous edema about the tibia and fibula extending   to the foot. No subcutaneous air. No CT evidence of osteomyelitis.  Left lower extremity: Findings suggestive of cellulitis with extensive   circumferential subcutaneous edema about the tibia and fibula extending   to the foot. No subcutaneous air. No CT evidence of osteomyelitis.

## 2023-03-10 NOTE — PROGRESS NOTE ADULT - ASSESSMENT
62M A1c 7.4%, COPD, ischemic cardiomyopathy.     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     Multiple admissions for hypotension in the past year.     Here 3/1 with shock but febrile too.   He has no recollection of what happened.   Improved after empiric Vanc and Cefepime but I don't know what we treated.   Blood, urine and sputum cultures negative and CT chest abdomen unrevealing.   Right shin ulcer but no obvious cellulitis.     Developed a pruritic rash over his torso around 3/4-5.   Derm suspects Vancomycin drug rash.   Doubt reaction to Ceftriaxone. Tolerated 6 weeks in 2020 and Cefepime this admission.   Unclear Zosyn reaction, reportedly had itching in ED on 12/12/2022 but he doesn't recall.   Resolved.   Outpatient allergy evaluation     Left leg erythema with bullae developed also around 3/4-5.   Derm suspects resolving bullous cellulitis.   Favor venous stasis. Occurred while on antibiotics making cellulitis less likely.   Retrying antibiotics out of caution - Ancef 1GM q8h (3/9- ).  No new rash.   Can go home on Keflex 500mg QID for 4 more days from now.     Clinically well, nontoxic. I'm not concerned about the fluctuating leukocytosis in itself.     Discussed with medicine   Will sign off, call back if needed    Sin Echeverria MD   Infectious Disease   Available on TEAMS. After 5PM and on weekends please page fellow on call or call [phone]

## 2023-03-10 NOTE — DISCHARGE NOTE PROVIDER - NSDCCPCAREPLAN_GEN_ALL_CORE_FT
PRINCIPAL DISCHARGE DIAGNOSIS  Diagnosis: Septic shock  Assessment and Plan of Treatment: You presented with fevers and altered mental status, found to be in septic shock. Admitted to the ICU for management of blood pressure with vasopressor medications. Although the source was not completely clear, it is very likely due to your lower extremity wound. Visiting nurse will help to manage your wound at home after discharge. Please follow with outpatient wound care routinely. Follow up with PCP in 1 week after discharge.      SECONDARY DISCHARGE DIAGNOSES  Diagnosis: Acute on chronic systolic congestive heart failure  Assessment and Plan of Treatment: You have a history of heart failure with reduced heart function. Repeat echocardiogram showed 25% ejection fraction. Your medications were discontinued due to your low blood pressure. When you return home please continue metoprolol and Jardiance. Hold all other heart failure medations such as entresto, spironolactone until follow up with your PCP.    Diagnosis: Fever  Assessment and Plan of Treatment:     Diagnosis: Hypotension  Assessment and Plan of Treatment:

## 2023-03-10 NOTE — PROGRESS NOTE ADULT - SUBJECTIVE AND OBJECTIVE BOX
[facility]   [phone]  RENAL FOLLOW UP NOTE  --------------------------------------------------------------------------------  Chief Complaint:    24 hour events/subjective:      seen earlier   no sob   no chills     PAST HISTORY  --------------------------------------------------------------------------------  No significant changes to PMH, PSH, FHx, SHx, unless otherwise noted    ALLERGIES & MEDICATIONS  --------------------------------------------------------------------------------  Allergies    ceftriaxone (Rash)  vancomycin (Rash (Mild to Mod))  Zosyn (Pruritus)    Intolerances      Standing Inpatient Medications  ammonium lactate 12% Lotion 1 Application(s) Topical two times a day  aspirin  chewable 81 milliGRAM(s) Oral daily  atorvastatin 80 milliGRAM(s) Oral at bedtime  buMETAnide 2 milliGRAM(s) Oral daily  ceFAZolin   IVPB 1000 milliGRAM(s) IV Intermittent every 8 hours  ceFAZolin   IVPB      chlorhexidine 4% Liquid 1 Application(s) Topical <User Schedule>  clopidogrel Tablet 75 milliGRAM(s) Oral daily  heparin   Injectable 5000 Unit(s) SubCutaneous every 8 hours  insulin glargine Injectable (LANTUS) 40 Unit(s) SubCutaneous at bedtime  insulin lispro (ADMELOG) corrective regimen sliding scale   SubCutaneous Before meals and at bedtime  insulin lispro Injectable (ADMELOG) 5 Unit(s) SubCutaneous three times a day before meals  nicotine -   7 mG/24Hr(s) Patch 1 Patch Transdermal daily  pantoprazole    Tablet 40 milliGRAM(s) Oral before breakfast  tamsulosin 0.4 milliGRAM(s) Oral at bedtime  triamcinolone 0.1% Cream 1 Application(s) Topical every 12 hours    PRN Inpatient Medications  acetaminophen     Tablet .. 650 milliGRAM(s) Oral every 6 hours PRN  acetaminophen     Tablet .. 650 milliGRAM(s) Oral every 6 hours PRN  acetaminophen  Suppository .. 650 milliGRAM(s) Rectal once PRN  albuterol    90 MICROgram(s) HFA Inhaler 2 Puff(s) Inhalation three times a day PRN  ALPRAZolam 0.25 milliGRAM(s) Oral two times a day PRN  dextrose 50% Injectable 25 Gram(s) IV Push every 15 minutes PRN  diphenhydrAMINE 50 milliGRAM(s) Oral every 4 hours PRN      REVIEW OF SYSTEMS  --------------------------------------------------------------------------------    Gen: denies  fevers/chills,  CVS: denies chest pain/palpitations  Resp: denies SOB/Cough  GI: Denies N/V/Abd pain  : Denies dysuria/ or decrease urination     VITALS/PHYSICAL EXAM  --------------------------------------------------------------------------------  T(C): 37 (03-10-23 @ 08:45), Max: 37 (03-10-23 @ 08:45)  HR: 89 (03-10-23 @ 21:31) (89 - 94)  BP: 109/67 (03-10-23 @ 21:31) (93/57 - 109/67)  RR: 18 (03-10-23 @ 21:31) (18 - 18)  SpO2: 97% (03-10-23 @ 21:31) (96% - 97%)  Wt(kg): --        03-09-23 @ 07:01  -  03-10-23 @ 07:00  --------------------------------------------------------  IN: 550 mL / OUT: 600 mL / NET: -50 mL      Physical Exam:  	  Gen:  on RA   	Pulm: decrease bs  no rales or ronchi or wheezing  	CV: no JVD. RRR, S1S2; no rub  	Abd: +BS, soft, nontender/nondistended, obese  	UE: Warm, no cyanosis  no clubbing,  no edema;   	LE: Warm, no cyanosis  no clubbing, no edema, RLE erhythema + skin ulcer present on admission as well   	Neuro: alert and oriented.  	Skin:  + rash macular back thoracic and left leg area. Left leg is  erythematous and with left leg edema weeping at ankle left  + dressing     LABS/STUDIES  --------------------------------------------------------------------------------              13.1   13.56 >-----------<  243      [03-10-23 @ 06:22]              42.5     132  |  89  |  122  ----------------------------<  300      [03-10-23 @ 06:22]  3.5   |  25  |  2.12        Ca     9.7     [03-10-23 @ 06:22]            Creatinine Trend:  SCr 2.12 [03-10 @ 06:22]  SCr 2.45 [03-09 @ 07:21]  SCr 3.10 [03-08 @ 06:34]  SCr 3.47 [03-07 @ 06:50]  SCr 3.27 [03-06 @ 17:26]              Urinalysis - [03-08-23 @ 06:39]      Color Light Yellow / Appearance Clear / SG 1.012 / pH 6.0      Gluc Negative / Ketone Negative  / Bili Negative / Urobili Negative       Blood Trace / Protein 30 mg/dL / Leuk Est Negative / Nitrite Negative      RBC 17 / WBC 4 / Hyaline 7 / Gran  / Sq Epi  / Non Sq Epi 0 / Bacteria Negative    Urine Creatinine 50      [03-08-23 @ 06:40]  Urine Sodium 67      [03-08-23 @ 06:40]  Urine Urea Nitrogen 433      [03-08-23 @ 06:40]  Urine Osmolality 346      [03-08-23 @ 06:40]    Iron 14, TIBC --, %sat --      [03-02-23 @ 01:20]  Ferritin 225      [03-02-23 @ 01:18]  PTH -- (Ca 8.9)      [10-02-22 @ 07:04]   73  HbA1c 8.9      [12-16-19 @ 08:15]  TSH 3.02      [09-30-22 @ 13:45]

## 2023-03-11 ENCOUNTER — TRANSCRIPTION ENCOUNTER (OUTPATIENT)
Age: 63
End: 2023-03-11

## 2023-03-11 VITALS
SYSTOLIC BLOOD PRESSURE: 103 MMHG | DIASTOLIC BLOOD PRESSURE: 68 MMHG | OXYGEN SATURATION: 97 % | TEMPERATURE: 97 F | HEART RATE: 83 BPM | RESPIRATION RATE: 18 BRPM

## 2023-03-11 LAB
ALBUMIN SERPL ELPH-MCNC: 3.5 G/DL — SIGNIFICANT CHANGE UP (ref 3.3–5)
ALP SERPL-CCNC: 281 U/L — HIGH (ref 40–120)
ALT FLD-CCNC: 35 U/L — SIGNIFICANT CHANGE UP (ref 10–45)
ANION GAP SERPL CALC-SCNC: 17 MMOL/L — SIGNIFICANT CHANGE UP (ref 5–17)
AST SERPL-CCNC: 37 U/L — SIGNIFICANT CHANGE UP (ref 10–40)
BILIRUB SERPL-MCNC: 0.4 MG/DL — SIGNIFICANT CHANGE UP (ref 0.2–1.2)
BUN SERPL-MCNC: 115 MG/DL — HIGH (ref 7–23)
CALCIUM SERPL-MCNC: 9.3 MG/DL — SIGNIFICANT CHANGE UP (ref 8.4–10.5)
CHLORIDE SERPL-SCNC: 87 MMOL/L — LOW (ref 96–108)
CO2 SERPL-SCNC: 27 MMOL/L — SIGNIFICANT CHANGE UP (ref 22–31)
CREAT SERPL-MCNC: 1.71 MG/DL — HIGH (ref 0.5–1.3)
EGFR: 45 ML/MIN/1.73M2 — LOW
GLUCOSE BLDC GLUCOMTR-MCNC: 238 MG/DL — HIGH (ref 70–99)
GLUCOSE BLDC GLUCOMTR-MCNC: 273 MG/DL — HIGH (ref 70–99)
GLUCOSE SERPL-MCNC: 296 MG/DL — HIGH (ref 70–99)
HCT VFR BLD CALC: 43.4 % — SIGNIFICANT CHANGE UP (ref 39–50)
HGB BLD-MCNC: 13.1 G/DL — SIGNIFICANT CHANGE UP (ref 13–17)
MCHC RBC-ENTMCNC: 23.3 PG — LOW (ref 27–34)
MCHC RBC-ENTMCNC: 30.2 GM/DL — LOW (ref 32–36)
MCV RBC AUTO: 77.2 FL — LOW (ref 80–100)
NRBC # BLD: 0 /100 WBCS — SIGNIFICANT CHANGE UP (ref 0–0)
PLATELET # BLD AUTO: 241 K/UL — SIGNIFICANT CHANGE UP (ref 150–400)
POTASSIUM SERPL-MCNC: 3.3 MMOL/L — LOW (ref 3.5–5.3)
POTASSIUM SERPL-SCNC: 3.3 MMOL/L — LOW (ref 3.5–5.3)
PROT SERPL-MCNC: 8 G/DL — SIGNIFICANT CHANGE UP (ref 6–8.3)
RBC # BLD: 5.62 M/UL — SIGNIFICANT CHANGE UP (ref 4.2–5.8)
RBC # FLD: 18.4 % — HIGH (ref 10.3–14.5)
SODIUM SERPL-SCNC: 131 MMOL/L — LOW (ref 135–145)
WBC # BLD: 11.93 K/UL — HIGH (ref 3.8–10.5)
WBC # FLD AUTO: 11.93 K/UL — HIGH (ref 3.8–10.5)

## 2023-03-11 PROCEDURE — 83540 ASSAY OF IRON: CPT

## 2023-03-11 PROCEDURE — 74176 CT ABD & PELVIS W/O CONTRAST: CPT | Mod: MA

## 2023-03-11 PROCEDURE — 82607 VITAMIN B-12: CPT

## 2023-03-11 PROCEDURE — 80048 BASIC METABOLIC PNL TOTAL CA: CPT

## 2023-03-11 PROCEDURE — 93970 EXTREMITY STUDY: CPT

## 2023-03-11 PROCEDURE — 82565 ASSAY OF CREATININE: CPT

## 2023-03-11 PROCEDURE — 99285 EMERGENCY DEPT VISIT HI MDM: CPT | Mod: 25

## 2023-03-11 PROCEDURE — 87640 STAPH A DNA AMP PROBE: CPT

## 2023-03-11 PROCEDURE — 83735 ASSAY OF MAGNESIUM: CPT

## 2023-03-11 PROCEDURE — 97110 THERAPEUTIC EXERCISES: CPT

## 2023-03-11 PROCEDURE — 71045 X-RAY EXAM CHEST 1 VIEW: CPT

## 2023-03-11 PROCEDURE — 97116 GAIT TRAINING THERAPY: CPT

## 2023-03-11 PROCEDURE — C8929: CPT

## 2023-03-11 PROCEDURE — 87507 IADNA-DNA/RNA PROBE TQ 12-25: CPT

## 2023-03-11 PROCEDURE — 84145 PROCALCITONIN (PCT): CPT

## 2023-03-11 PROCEDURE — 84132 ASSAY OF SERUM POTASSIUM: CPT

## 2023-03-11 PROCEDURE — 93975 VASCULAR STUDY: CPT

## 2023-03-11 PROCEDURE — 87637 SARSCOV2&INF A&B&RSV AMP PRB: CPT

## 2023-03-11 PROCEDURE — P9047: CPT

## 2023-03-11 PROCEDURE — 82330 ASSAY OF CALCIUM: CPT

## 2023-03-11 PROCEDURE — 82746 ASSAY OF FOLIC ACID SERUM: CPT

## 2023-03-11 PROCEDURE — 76700 US EXAM ABDOM COMPLETE: CPT

## 2023-03-11 PROCEDURE — 82728 ASSAY OF FERRITIN: CPT

## 2023-03-11 PROCEDURE — 84100 ASSAY OF PHOSPHORUS: CPT

## 2023-03-11 PROCEDURE — 85018 HEMOGLOBIN: CPT

## 2023-03-11 PROCEDURE — 84484 ASSAY OF TROPONIN QUANT: CPT

## 2023-03-11 PROCEDURE — 73700 CT LOWER EXTREMITY W/O DYE: CPT

## 2023-03-11 PROCEDURE — 87070 CULTURE OTHR SPECIMN AEROBIC: CPT

## 2023-03-11 PROCEDURE — 83036 HEMOGLOBIN GLYCOSYLATED A1C: CPT

## 2023-03-11 PROCEDURE — 83605 ASSAY OF LACTIC ACID: CPT

## 2023-03-11 PROCEDURE — 81001 URINALYSIS AUTO W/SCOPE: CPT

## 2023-03-11 PROCEDURE — 76604 US EXAM CHEST: CPT

## 2023-03-11 PROCEDURE — 80202 ASSAY OF VANCOMYCIN: CPT

## 2023-03-11 PROCEDURE — 96374 THER/PROPH/DIAG INJ IV PUSH: CPT

## 2023-03-11 PROCEDURE — 84295 ASSAY OF SERUM SODIUM: CPT

## 2023-03-11 PROCEDURE — 87040 BLOOD CULTURE FOR BACTERIA: CPT

## 2023-03-11 PROCEDURE — 85610 PROTHROMBIN TIME: CPT

## 2023-03-11 PROCEDURE — 96375 TX/PRO/DX INJ NEW DRUG ADDON: CPT

## 2023-03-11 PROCEDURE — 85027 COMPLETE CBC AUTOMATED: CPT

## 2023-03-11 PROCEDURE — 83935 ASSAY OF URINE OSMOLALITY: CPT

## 2023-03-11 PROCEDURE — 82803 BLOOD GASES ANY COMBINATION: CPT

## 2023-03-11 PROCEDURE — 83880 ASSAY OF NATRIURETIC PEPTIDE: CPT

## 2023-03-11 PROCEDURE — 71275 CT ANGIOGRAPHY CHEST: CPT | Mod: MA

## 2023-03-11 PROCEDURE — 82947 ASSAY GLUCOSE BLOOD QUANT: CPT

## 2023-03-11 PROCEDURE — 97161 PT EVAL LOW COMPLEX 20 MIN: CPT

## 2023-03-11 PROCEDURE — 84300 ASSAY OF URINE SODIUM: CPT

## 2023-03-11 PROCEDURE — 93308 TTE F-UP OR LMTD: CPT

## 2023-03-11 PROCEDURE — 36415 COLL VENOUS BLD VENIPUNCTURE: CPT

## 2023-03-11 PROCEDURE — 82435 ASSAY OF BLOOD CHLORIDE: CPT

## 2023-03-11 PROCEDURE — 82962 GLUCOSE BLOOD TEST: CPT

## 2023-03-11 PROCEDURE — 82550 ASSAY OF CK (CPK): CPT

## 2023-03-11 PROCEDURE — 84540 ASSAY OF URINE/UREA-N: CPT

## 2023-03-11 PROCEDURE — 87086 URINE CULTURE/COLONY COUNT: CPT

## 2023-03-11 PROCEDURE — 82570 ASSAY OF URINE CREATININE: CPT

## 2023-03-11 PROCEDURE — 87641 MR-STAPH DNA AMP PROBE: CPT

## 2023-03-11 PROCEDURE — 85025 COMPLETE CBC W/AUTO DIFF WBC: CPT

## 2023-03-11 PROCEDURE — 85014 HEMATOCRIT: CPT

## 2023-03-11 PROCEDURE — 85730 THROMBOPLASTIN TIME PARTIAL: CPT

## 2023-03-11 PROCEDURE — 97530 THERAPEUTIC ACTIVITIES: CPT

## 2023-03-11 PROCEDURE — 83930 ASSAY OF BLOOD OSMOLALITY: CPT

## 2023-03-11 PROCEDURE — 80053 COMPREHEN METABOLIC PANEL: CPT

## 2023-03-11 RX ORDER — POTASSIUM CHLORIDE 20 MEQ
20 PACKET (EA) ORAL
Refills: 0 | Status: COMPLETED | OUTPATIENT
Start: 2023-03-11 | End: 2023-03-11

## 2023-03-11 RX ORDER — CEPHALEXIN 500 MG
1 CAPSULE ORAL
Qty: 16 | Refills: 0
Start: 2023-03-11 | End: 2023-03-14

## 2023-03-11 RX ORDER — SACUBITRIL AND VALSARTAN 24; 26 MG/1; MG/1
1 TABLET, FILM COATED ORAL
Qty: 0 | Refills: 0 | DISCHARGE

## 2023-03-11 RX ORDER — SPIRONOLACTONE 25 MG/1
1 TABLET, FILM COATED ORAL
Qty: 0 | Refills: 0 | DISCHARGE

## 2023-03-11 RX ADMIN — Medication 1 PATCH: at 12:24

## 2023-03-11 RX ADMIN — HEPARIN SODIUM 5000 UNIT(S): 5000 INJECTION INTRAVENOUS; SUBCUTANEOUS at 00:10

## 2023-03-11 RX ADMIN — PANTOPRAZOLE SODIUM 40 MILLIGRAM(S): 20 TABLET, DELAYED RELEASE ORAL at 05:44

## 2023-03-11 RX ADMIN — Medication 100 MILLIGRAM(S): at 13:47

## 2023-03-11 RX ADMIN — Medication 50 MILLIGRAM(S): at 05:06

## 2023-03-11 RX ADMIN — Medication 81 MILLIGRAM(S): at 12:24

## 2023-03-11 RX ADMIN — BUMETANIDE 2 MILLIGRAM(S): 0.25 INJECTION INTRAMUSCULAR; INTRAVENOUS at 05:07

## 2023-03-11 RX ADMIN — Medication 50 MILLIGRAM(S): at 05:14

## 2023-03-11 RX ADMIN — CHLORHEXIDINE GLUCONATE 1 APPLICATION(S): 213 SOLUTION TOPICAL at 05:06

## 2023-03-11 RX ADMIN — Medication 20 MILLIEQUIVALENT(S): at 12:25

## 2023-03-11 RX ADMIN — Medication 1 APPLICATION(S): at 05:03

## 2023-03-11 RX ADMIN — CLOPIDOGREL BISULFATE 75 MILLIGRAM(S): 75 TABLET, FILM COATED ORAL at 12:25

## 2023-03-11 RX ADMIN — Medication 100 MILLIGRAM(S): at 05:04

## 2023-03-11 RX ADMIN — Medication 4: at 08:16

## 2023-03-11 RX ADMIN — Medication 6: at 12:25

## 2023-03-11 RX ADMIN — Medication 5 UNIT(S): at 08:16

## 2023-03-11 RX ADMIN — Medication 5 UNIT(S): at 12:26

## 2023-03-11 RX ADMIN — Medication 20 MILLIEQUIVALENT(S): at 13:33

## 2023-03-11 RX ADMIN — HEPARIN SODIUM 5000 UNIT(S): 5000 INJECTION INTRAVENOUS; SUBCUTANEOUS at 08:18

## 2023-03-11 RX ADMIN — Medication 50 MILLIGRAM(S): at 00:10

## 2023-03-11 NOTE — PROGRESS NOTE ADULT - PROVIDER SPECIALTY LIST ADULT
Nephrology
Nephrology
Infectious Disease
Internal Medicine
MICU
Nephrology
Nephrology
Infectious Disease
Infectious Disease
Internal Medicine
MICU
MICU
Nephrology
Dermatology
MICU
MICU
Nephrology
Infectious Disease

## 2023-03-11 NOTE — DISCHARGE NOTE NURSING/CASE MANAGEMENT/SOCIAL WORK - NSDCVIVACCINE_GEN_ALL_CORE_FT
influenza, injectable, quadrivalent, preservative free; 08-Feb-2018 11:10; Chely Patterson (RN); [employer]; [id number]; IntraMuscular; Deltoid Left.; 0.5 milliLiter(s); VIS (VIS Published: 07-Aug-2015, VIS Presented: 08-Feb-2018);   influenza, injectable, quadrivalent, preservative free; 21-Sep-2020 18:51; Yan Cazares (RN); [employer]; [id number] (Exp. Date: 30-Jun-2021); IntraMuscular; Deltoid Right.; 0.5 milliLiter(s); VIS (VIS Published: 15-Aug-2019, VIS Presented: 21-Sep-2020);

## 2023-03-11 NOTE — PROGRESS NOTE ADULT - REASON FOR ADMISSION
Hypotension requiring pressors in setting of hypoxia and fever.
septic shock/heart failure exacerbation
Hypotension requiring pressors in setting of hypoxia and fever.

## 2023-03-11 NOTE — PROGRESS NOTE ADULT - SUBJECTIVE AND OBJECTIVE BOX
[facility]   [phone]  RENAL FOLLOW UP NOTE  --------------------------------------------------------------------------------  Chief Complaint:    24 hour events/subjective:    seen earlier   states no sob   urinating well    no chills     PAST HISTORY  --------------------------------------------------------------------------------  No significant changes to PMH, PSH, FHx, SHx, unless otherwise noted    ALLERGIES & MEDICATIONS  --------------------------------------------------------------------------------  Allergies    ceftriaxone (Rash)  vancomycin (Rash (Mild to Mod))  Zosyn (Pruritus)    Intolerances      Standing Inpatient Medications  ammonium lactate 12% Lotion 1 Application(s) Topical two times a day  aspirin  chewable 81 milliGRAM(s) Oral daily  atorvastatin 80 milliGRAM(s) Oral at bedtime  buMETAnide 2 milliGRAM(s) Oral daily  ceFAZolin   IVPB 1000 milliGRAM(s) IV Intermittent every 8 hours  ceFAZolin   IVPB      chlorhexidine 4% Liquid 1 Application(s) Topical <User Schedule>  clopidogrel Tablet 75 milliGRAM(s) Oral daily  heparin   Injectable 5000 Unit(s) SubCutaneous every 8 hours  insulin glargine Injectable (LANTUS) 40 Unit(s) SubCutaneous at bedtime  insulin lispro (ADMELOG) corrective regimen sliding scale   SubCutaneous Before meals and at bedtime  insulin lispro Injectable (ADMELOG) 5 Unit(s) SubCutaneous three times a day before meals  metoprolol succinate ER 50 milliGRAM(s) Oral daily  nicotine -   7 mG/24Hr(s) Patch 1 Patch Transdermal daily  pantoprazole    Tablet 40 milliGRAM(s) Oral before breakfast  tamsulosin 0.4 milliGRAM(s) Oral at bedtime  triamcinolone 0.1% Cream 1 Application(s) Topical every 12 hours    PRN Inpatient Medications  acetaminophen     Tablet .. 650 milliGRAM(s) Oral every 6 hours PRN  acetaminophen     Tablet .. 650 milliGRAM(s) Oral every 6 hours PRN  acetaminophen  Suppository .. 650 milliGRAM(s) Rectal once PRN  albuterol    90 MICROgram(s) HFA Inhaler 2 Puff(s) Inhalation three times a day PRN  ALPRAZolam 0.25 milliGRAM(s) Oral two times a day PRN  dextrose 50% Injectable 25 Gram(s) IV Push every 15 minutes PRN  diphenhydrAMINE 50 milliGRAM(s) Oral every 4 hours PRN      REVIEW OF SYSTEMS  --------------------------------------------------------------------------------    Gen: denies  fevers/chills,  CVS: denies chest pain/palpitations  Resp: denies SOB/Cough  GI: Denies N/V/Abd pain  : Denies dysuria    VITALS/PHYSICAL EXAM  --------------------------------------------------------------------------------  T(C): 36.2 (03-11-23 @ 15:51), Max: 36.3 (03-11-23 @ 13:51)  HR: 83 (03-11-23 @ 15:51) (81 - 83)  BP: 103/68 (03-11-23 @ 15:51) (100/62 - 103/68)  RR: 18 (03-11-23 @ 15:51) (18 - 18)  SpO2: 97% (03-11-23 @ 15:51) (96% - 97%)  Wt(kg): --        03-10-23 @ 07:01  -  03-11-23 @ 07:00  --------------------------------------------------------  IN: 350 mL / OUT: 0 mL / NET: 350 mL      Physical Exam:  	    Gen:  on RA   	Pulm: decrease bs  no rales or ronchi or wheezing  	CV: no JVD. RRR, S1S2; no rub  	Abd: +BS, soft, nontender/nondistended, obese  	UE: Warm, no cyanosis  no clubbing,  no edema;   	LE: Warm, no cyanosis  no clubbing, no edema, RLE erhythema + skin ulcer present on admission as well   	Neuro: alert and oriented.  	Skin:  + rash macular back thoracic and left leg area. Left leg is  erythematous and with left leg edema weeping at ankle left  + dressing     LABS/STUDIES  --------------------------------------------------------------------------------              13.1   11.93 >-----------<  241      [03-11-23 @ 07:46]              43.4     131  |  87  |  115  ----------------------------<  296      [03-11-23 @ 07:47]  3.3   |  27  |  1.71        Ca     9.3     [03-11-23 @ 07:47]    TPro  8.0  /  Alb  3.5  /  TBili  0.4  /  DBili  x   /  AST  37  /  ALT  35  /  AlkPhos  281  [03-11-23 @ 07:47]          Creatinine Trend:  SCr 1.71 [03-11 @ 07:47]  SCr 2.12 [03-10 @ 06:22]  SCr 2.45 [03-09 @ 07:21]  SCr 3.10 [03-08 @ 06:34]  SCr 3.47 [03-07 @ 06:50]              Urinalysis - [03-08-23 @ 06:39]      Color Light Yellow / Appearance Clear / SG 1.012 / pH 6.0      Gluc Negative / Ketone Negative  / Bili Negative / Urobili Negative       Blood Trace / Protein 30 mg/dL / Leuk Est Negative / Nitrite Negative      RBC 17 / WBC 4 / Hyaline 7 / Gran  / Sq Epi  / Non Sq Epi 0 / Bacteria Negative    Urine Creatinine 50      [03-08-23 @ 06:40]  Urine Sodium 67      [03-08-23 @ 06:40]  Urine Urea Nitrogen 433      [03-08-23 @ 06:40]  Urine Osmolality 346      [03-08-23 @ 06:40]    Iron 14, TIBC --, %sat --      [03-02-23 @ 01:20]  Ferritin 225      [03-02-23 @ 01:18]  PTH -- (Ca 8.9)      [10-02-22 @ 07:04]   73  HbA1c 8.9      [12-16-19 @ 08:15]  TSH 3.02      [09-30-22 @ 13:45]

## 2023-03-11 NOTE — DISCHARGE NOTE NURSING/CASE MANAGEMENT/SOCIAL WORK - PATIENT PORTAL LINK FT
You can access the FollowMyHealth Patient Portal offered by [facility] by registering at the following website: [email or web address]. By joining [facility]’s FollowMyHealth portal, you will also be able to view your health information using other applications (apps) compatible with our system.

## 2023-03-11 NOTE — DISCHARGE NOTE NURSING/CASE MANAGEMENT/SOCIAL WORK - NSDCPEFALRISK_GEN_ALL_CORE
For information on Fall & Injury Prevention, visit: [email or web address] OR  [email or web address] OR  https://www.cdc.gov/steadi/patient.html

## 2023-03-11 NOTE — PROGRESS NOTE ADULT - ASSESSMENT
62 year old male with pmh of HFrEF with an EF of 19% status post AICD, CAD status post stents, DM2, HTN, COPD with a 30+ pack year history of smoking presents to the ED with shortness of breath.       1- MANJIT on CKD i,e ATN   2- sepsis  3- CHF  4- DM2  5- chronic R hydro  6- Left leg cellulitis  7- rash     MANJIT on ckd III in setting of sepsis/fevers/hypotension   has hx of cardiomyopathy with ef < 20 %    suspect possible cephalosporin rash on thorax   cr is still high  but improving towards baseline.   bun remains high but also slowly improving as well   restart bumex 2 mg  bid    insulin s-s-   strict I/O  pan culture  NGTD   hypokalemia supplement kcl

## 2023-03-13 ENCOUNTER — APPOINTMENT (OUTPATIENT)
Dept: WOUND CARE | Facility: CLINIC | Age: 63
End: 2023-03-13

## 2023-03-17 ENCOUNTER — APPOINTMENT (OUTPATIENT)
Dept: WOUND CARE | Facility: CLINIC | Age: 63
End: 2023-03-17
Payer: MEDICARE

## 2023-03-17 VITALS
OXYGEN SATURATION: 96 % | HEART RATE: 98 BPM | RESPIRATION RATE: 16 BRPM | TEMPERATURE: 97.4 F | DIASTOLIC BLOOD PRESSURE: 69 MMHG | SYSTOLIC BLOOD PRESSURE: 101 MMHG

## 2023-03-17 DIAGNOSIS — L97.929 NON-PRESSURE CHRONIC ULCER OF UNSPECIFIED PART OF LEFT LOWER LEG WITH UNSPECIFIED SEVERITY: ICD-10-CM

## 2023-03-17 PROCEDURE — 11042 DBRDMT SUBQ TIS 1ST 20SQCM/<: CPT

## 2023-03-17 PROCEDURE — 11045 DBRDMT SUBQ TISS EACH ADDL: CPT

## 2023-03-17 NOTE — HISTORY OF PRESENT ILLNESS
[FreeTextEntry1] : Mr. SEVERIANO MARTINEZ   presents to the office with wound on left leg, recent hospitalization for cellultis, currently on keflex.  . The wound   located on  the left leg. The patient has complaints of dry flaky skin.  The patient has been dressing the wound with moisturizers.  The patient denies fevers or chills. The patient has localized pain to the wound upon dressing changes. The patient has no other complaints or associated symptoms. HbA1c is  7.8\par Patient follows Dr. Sigala for foot ulcer in the office\par \par accompanied by wife\par has home care 3x/week\par

## 2023-03-17 NOTE — REASON FOR VISIT
[Consultation] : a consultation visit [FreeTextEntry1] : left leg  ulcer, recent hospitalization for cellultis

## 2023-03-17 NOTE — ASSESSMENT
[FreeTextEntry1] : 3-17-23:\par Wound Assessment and Plan:\par \par The patient presents with a wound to the left leg.  Swelling noted to the extremity. Dry flaky skin.\par On exam:\par LLE Wound covered in scab/dry slough  excisionally debrided . \par No s/s of infection. \par Venous studies ordered\par \par Recommendation:\par Apply lidocaine or topical anesthetic if needed to reduce pain upon washing the wound.\par Wash wound with ---- Dove skin sensitive soap and clean water \par Apply ----medical grade honey/ adaptic, xtrasorb, bruce, ace 3 times per week.\par Leg elevation as tolerated\par Encouraged ambulation or exercise.\par Optimization of nutrition.\par Offloading to the wound site.\par \par \par -----Homecare orders in place\par \par

## 2023-03-17 NOTE — PLAN
[FreeTextEntry1] : 3-17-23:\par Plan\par Medihoney, adaptic, xtraspreeti, bruce, ace\par Nursing orders given\par Vascular studies ordered\par Return in 3 weeks

## 2023-03-17 NOTE — H&P ADULT - PROBLEM SELECTOR PROBLEM 1
Reviewed chart, moned erinn Keenan [facility] Wound vac is at [facility]. Pt is a medicaid bed hold, no precert needed. Envelope and ambulance form in soft chart. Will need a -COVID day of discharge. ID and renal on board, pt is a HD pt and on IV vanco dosed by pharmacy. Plan is to return to [facility] at discharge, no precert needed, will need a -COVID day of discharge, envelope and ambulance form in soft chart. Dennis Perkins, MSW, LSW Generalized weakness

## 2023-03-17 NOTE — PHYSICAL EXAM
[Please See PDF for [employer]] : Please See PDF for [employer]. [1+] : left 1+ [Skin Ulcer] : ulcer [Alert] : alert [Oriented to Person] : oriented to person [Oriented to Place] : oriented to place [Oriented to Time] : oriented to time [Calm] : calm [de-identified] : NAD [de-identified] : AT [de-identified] : supple [de-identified] : equal chest rise [de-identified] : soft NT [de-identified] : FROM

## 2023-03-27 ENCOUNTER — APPOINTMENT (OUTPATIENT)
Dept: CARDIOLOGY | Facility: CLINIC | Age: 63
End: 2023-03-27

## 2023-03-29 ENCOUNTER — APPOINTMENT (OUTPATIENT)
Dept: CARDIOLOGY | Facility: CLINIC | Age: 63
End: 2023-03-29

## 2023-03-29 ENCOUNTER — APPOINTMENT (OUTPATIENT)
Dept: VASCULAR SURGERY | Facility: CLINIC | Age: 63
End: 2023-03-29
Payer: MEDICARE

## 2023-03-29 PROCEDURE — 93970 EXTREMITY STUDY: CPT

## 2023-04-03 ENCOUNTER — APPOINTMENT (OUTPATIENT)
Dept: WOUND CARE | Facility: CLINIC | Age: 63
End: 2023-04-03

## 2023-04-03 ENCOUNTER — APPOINTMENT (OUTPATIENT)
Dept: CARDIOLOGY | Facility: CLINIC | Age: 63
End: 2023-04-03

## 2023-04-04 ENCOUNTER — APPOINTMENT (OUTPATIENT)
Dept: CARDIOLOGY | Facility: CLINIC | Age: 63
End: 2023-04-04

## 2023-04-05 ENCOUNTER — APPOINTMENT (OUTPATIENT)
Dept: CARDIOLOGY | Facility: CLINIC | Age: 63
End: 2023-04-05

## 2023-04-06 ENCOUNTER — APPOINTMENT (OUTPATIENT)
Dept: CARDIOLOGY | Facility: CLINIC | Age: 63
End: 2023-04-06

## 2023-04-10 ENCOUNTER — APPOINTMENT (OUTPATIENT)
Dept: CARDIOLOGY | Facility: CLINIC | Age: 63
End: 2023-04-10

## 2023-04-12 ENCOUNTER — APPOINTMENT (OUTPATIENT)
Dept: CARDIOLOGY | Facility: CLINIC | Age: 63
End: 2023-04-12

## 2023-04-13 ENCOUNTER — APPOINTMENT (OUTPATIENT)
Dept: CARDIOLOGY | Facility: CLINIC | Age: 63
End: 2023-04-13

## 2023-04-17 ENCOUNTER — APPOINTMENT (OUTPATIENT)
Dept: CARDIOLOGY | Facility: CLINIC | Age: 63
End: 2023-04-17

## 2023-04-17 ENCOUNTER — APPOINTMENT (OUTPATIENT)
Dept: WOUND CARE | Facility: CLINIC | Age: 63
End: 2023-04-17
Payer: MEDICARE

## 2023-04-17 VITALS
BODY MASS INDEX: 30.2 KG/M2 | SYSTOLIC BLOOD PRESSURE: 105 MMHG | WEIGHT: 248 LBS | HEART RATE: 94 BPM | DIASTOLIC BLOOD PRESSURE: 68 MMHG | TEMPERATURE: 97.1 F | OXYGEN SATURATION: 95 % | HEIGHT: 76 IN

## 2023-04-17 DIAGNOSIS — T14.8XXA OTHER INJURY OF UNSPECIFIED BODY REGION, INITIAL ENCOUNTER: ICD-10-CM

## 2023-04-17 DIAGNOSIS — S90.811A ABRASION, RIGHT FOOT, INITIAL ENCOUNTER: ICD-10-CM

## 2023-04-17 DIAGNOSIS — L97.522 NON-PRESSURE CHRONIC ULCER OF OTHER PART OF LEFT FOOT WITH FAT LAYER EXPOSED: ICD-10-CM

## 2023-04-17 PROCEDURE — 99213 OFFICE O/P EST LOW 20 MIN: CPT

## 2023-04-17 NOTE — ASSESSMENT
[FreeTextEntry1] : 62 M with RIght foot plantar submet 3rd abrasion healed\par - Pt seen and evaluated.\par - Right foot submet 3 abrasion hyperkeratotic tissue debrided, no open wounds, no acute SOI\par - new anterior right tibia abrasion healed\par - Pt to continue wearing NB shoes with supporting insoles at at times \par - pt will follow up in regular office for routine foot care

## 2023-04-17 NOTE — HISTORY OF PRESENT ILLNESS
[FreeTextEntry1] : The patient is seen for follow-up of submet 3 wound of the right foot . Pt states was in [place] and stepped on piece of dog food. Pt says next day his wife notice "black spot on his foot". Returned to [place] and had been treating with mupirocin and bacitracin. Pt denies any F/C/N/V/SOB or other complaints\par \par FBG 2/27 152 mg/dL

## 2023-04-19 ENCOUNTER — APPOINTMENT (OUTPATIENT)
Dept: CARDIOLOGY | Facility: CLINIC | Age: 63
End: 2023-04-19

## 2023-04-19 PROBLEM — L97.522 CHRONIC ULCER OF LEFT FOOT WITH FAT LAYER EXPOSED: Status: ACTIVE | Noted: 2022-02-07

## 2023-04-19 NOTE — PLAN
[FreeTextEntry1] : 3-17-23:\par Plan\par Medihoney, adaptic, xtrasorb, bruce, ace\par Nursing orders given\par Vascular studies ordered\par Return in 3 weeks\par \par 4/17/2023:\par Plan: Left medial leg wound healed\par Moisturize legs daily and apply compression stockings\par Follow up in 1 year

## 2023-04-19 NOTE — PHYSICAL EXAM
[1+] : left 1+ [Skin Ulcer] : ulcer [Alert] : alert [Oriented to Person] : oriented to person [Oriented to Place] : oriented to place [Oriented to Time] : oriented to time [Calm] : calm [Please See PDF for [employer]] : Please See PDF for [employer]. [de-identified] : NAD [de-identified] : AT [de-identified] : supple [de-identified] : equal chest rise [de-identified] : soft NT [de-identified] : FROM

## 2023-04-19 NOTE — ASSESSMENT
[FreeTextEntry1] : 3-17-23:\par Wound Assessment and Plan:\par \par The patient presents with a wound to the left leg.  Swelling noted to the extremity. Dry flaky skin.\par On exam:\par LLE Wound covered in scab/dry slough  excisionally debrided . \par No s/s of infection. \par Venous studies ordered\par \par Recommendation:\par Apply lidocaine or topical anesthetic if needed to reduce pain upon washing the wound.\par Wash wound with ---- Dove skin sensitive soap and clean water \par Apply ----medical grade honey/ adaptic, xtrasorb, bruce, ace 3 times per week.\par Leg elevation as tolerated\par Encouraged ambulation or exercise.\par Optimization of nutrition.\par Offloading to the wound site.\par \par \par -----Homecare orders in place\par \par 4/17/23\par -Vascular studies completed\par

## 2023-04-20 ENCOUNTER — APPOINTMENT (OUTPATIENT)
Dept: CARDIOLOGY | Facility: CLINIC | Age: 63
End: 2023-04-20

## 2023-04-24 ENCOUNTER — APPOINTMENT (OUTPATIENT)
Dept: CARDIOLOGY | Facility: CLINIC | Age: 63
End: 2023-04-24

## 2023-04-26 ENCOUNTER — APPOINTMENT (OUTPATIENT)
Dept: ELECTROPHYSIOLOGY | Facility: CLINIC | Age: 63
End: 2023-04-26
Payer: MEDICARE

## 2023-04-26 ENCOUNTER — NON-APPOINTMENT (OUTPATIENT)
Age: 63
End: 2023-04-26

## 2023-04-26 ENCOUNTER — APPOINTMENT (OUTPATIENT)
Dept: CARDIOLOGY | Facility: CLINIC | Age: 63
End: 2023-04-26

## 2023-04-26 PROCEDURE — 93295 DEV INTERROG REMOTE 1/2/MLT: CPT

## 2023-04-26 PROCEDURE — 93296 REM INTERROG EVL PM/IDS: CPT

## 2023-04-27 ENCOUNTER — APPOINTMENT (OUTPATIENT)
Dept: CARDIOLOGY | Facility: CLINIC | Age: 63
End: 2023-04-27

## 2023-05-01 ENCOUNTER — APPOINTMENT (OUTPATIENT)
Dept: CARDIOLOGY | Facility: CLINIC | Age: 63
End: 2023-05-01

## 2023-05-03 ENCOUNTER — APPOINTMENT (OUTPATIENT)
Dept: CARDIOLOGY | Facility: CLINIC | Age: 63
End: 2023-05-03

## 2023-05-04 ENCOUNTER — APPOINTMENT (OUTPATIENT)
Dept: CARDIOLOGY | Facility: CLINIC | Age: 63
End: 2023-05-04

## 2023-05-08 ENCOUNTER — APPOINTMENT (OUTPATIENT)
Dept: WOUND CARE | Facility: CLINIC | Age: 63
End: 2023-05-08

## 2023-05-08 ENCOUNTER — APPOINTMENT (OUTPATIENT)
Dept: CARDIOLOGY | Facility: CLINIC | Age: 63
End: 2023-05-08

## 2023-05-10 ENCOUNTER — APPOINTMENT (OUTPATIENT)
Dept: CARDIOLOGY | Facility: CLINIC | Age: 63
End: 2023-05-10

## 2023-05-11 ENCOUNTER — APPOINTMENT (OUTPATIENT)
Dept: CARDIOLOGY | Facility: CLINIC | Age: 63
End: 2023-05-11

## 2023-05-15 ENCOUNTER — APPOINTMENT (OUTPATIENT)
Dept: CARDIOLOGY | Facility: CLINIC | Age: 63
End: 2023-05-15

## 2023-05-17 ENCOUNTER — APPOINTMENT (OUTPATIENT)
Dept: CARDIOLOGY | Facility: CLINIC | Age: 63
End: 2023-05-17

## 2023-05-18 ENCOUNTER — APPOINTMENT (OUTPATIENT)
Dept: CARDIOLOGY | Facility: CLINIC | Age: 63
End: 2023-05-18

## 2023-05-18 NOTE — H&P ADULT - PROBLEM/PLAN-5
Child Life Programming: Volunteer Visit    Patient received a visit from a volunteer (Jessie FREEMAN).   Volunteer offered materials (crafts, games, toys, etc.) to promote diversion.  Volunteer addressed coping needs of caregivers at the bedside to support family coping.   Volunteer offered play session to promote diversion and socialization.    Ya Schmitz  Child Life Specialist  21-TOYS ()   DISPLAY PLAN FREE TEXT

## 2023-05-22 ENCOUNTER — APPOINTMENT (OUTPATIENT)
Dept: CARDIOLOGY | Facility: CLINIC | Age: 63
End: 2023-05-22

## 2023-05-24 ENCOUNTER — APPOINTMENT (OUTPATIENT)
Dept: CARDIOLOGY | Facility: CLINIC | Age: 63
End: 2023-05-24

## 2023-05-25 ENCOUNTER — APPOINTMENT (OUTPATIENT)
Dept: WOUND CARE | Facility: CLINIC | Age: 63
End: 2023-05-25
Payer: MEDICARE

## 2023-05-25 ENCOUNTER — APPOINTMENT (OUTPATIENT)
Dept: CARDIOLOGY | Facility: CLINIC | Age: 63
End: 2023-05-25

## 2023-05-25 DIAGNOSIS — E11.52 TYPE 2 DIABETES MELLITUS WITH DIABETIC PERIPHERAL ANGIOPATHY WITH GANGRENE: ICD-10-CM

## 2023-05-25 DIAGNOSIS — Z79.4 TYPE 2 DIABETES MELLITUS WITH DIABETIC PERIPHERAL ANGIOPATHY WITH GANGRENE: ICD-10-CM

## 2023-05-25 PROCEDURE — 97597 DBRDMT OPN WND 1ST 20 CM/<: CPT | Mod: PD

## 2023-05-25 NOTE — HISTORY OF PRESENT ILLNESS
[FreeTextEntry1] : The patient is seen for follow-up of submet 3 wound of the right foot . Pt notes that last friday his right foot fissure re=opened up. He recent got new house ugg slippers. Pt denies any F/C/N/V/SOB or other complaints\par \par HbA1c is 8%

## 2023-05-25 NOTE — ASSESSMENT
[FreeTextEntry1] : 62 M with RIght foot plantar submet 1 to 3 fissure \par - Pt seen and evaluated.\par - Right foot fissure to level of dermis, with no acute signs of infection. Hyperkeratosis debrided to level of dermis using a dermal currete. No signs of infection. Right foot fissure cauterized using silver nitrate, then dressed w/ aquacell and DSD. No wound care needed for right foot at  home. \par - Left foot submet 1 hyperkeratosis debrided to level of epidermis using a 10 blade. \par - Pt to continue wearing NB shoes with supporting insoles at at times. Discontinue use of house slipper and use diabetic shoes indoors\par - pt will follow up in 4 weeks

## 2023-05-28 ENCOUNTER — INPATIENT (INPATIENT)
Facility: HOSPITAL | Age: 63
LOS: 5 days | Discharge: HOME | DRG: 871 | End: 2023-06-03
Attending: STUDENT IN AN ORGANIZED HEALTH CARE EDUCATION/TRAINING PROGRAM | Admitting: HOSPITALIST
Payer: MEDICARE

## 2023-05-28 VITALS
WEIGHT: 263.89 LBS | RESPIRATION RATE: 20 BRPM | DIASTOLIC BLOOD PRESSURE: 63 MMHG | OXYGEN SATURATION: 94 % | SYSTOLIC BLOOD PRESSURE: 98 MMHG | HEART RATE: 102 BPM | HEIGHT: 72 IN | TEMPERATURE: 100 F

## 2023-05-28 DIAGNOSIS — A41.9 SEPSIS, UNSPECIFIED ORGANISM: ICD-10-CM

## 2023-05-28 DIAGNOSIS — I50.20 UNSPECIFIED SYSTOLIC (CONGESTIVE) HEART FAILURE: ICD-10-CM

## 2023-05-28 DIAGNOSIS — Z98.52 VASECTOMY STATUS: Chronic | ICD-10-CM

## 2023-05-28 DIAGNOSIS — E11.9 TYPE 2 DIABETES MELLITUS WITHOUT COMPLICATIONS: ICD-10-CM

## 2023-05-28 DIAGNOSIS — J96.01 ACUTE RESPIRATORY FAILURE WITH HYPOXIA: ICD-10-CM

## 2023-05-28 DIAGNOSIS — R11.2 NAUSEA WITH VOMITING, UNSPECIFIED: ICD-10-CM

## 2023-05-28 DIAGNOSIS — Z29.9 ENCOUNTER FOR PROPHYLACTIC MEASURES, UNSPECIFIED: ICD-10-CM

## 2023-05-28 LAB
ALBUMIN SERPL ELPH-MCNC: 3.8 G/DL — SIGNIFICANT CHANGE UP (ref 3.3–5)
ALP SERPL-CCNC: 247 U/L — HIGH (ref 40–120)
ALT FLD-CCNC: 27 U/L — SIGNIFICANT CHANGE UP (ref 10–45)
ANION GAP SERPL CALC-SCNC: 19 MMOL/L — HIGH (ref 5–17)
ANISOCYTOSIS BLD QL: SLIGHT — SIGNIFICANT CHANGE UP
APPEARANCE UR: CLEAR — SIGNIFICANT CHANGE UP
AST SERPL-CCNC: 33 U/L — SIGNIFICANT CHANGE UP (ref 10–40)
BACTERIA # UR AUTO: NEGATIVE — SIGNIFICANT CHANGE UP
BASE EXCESS BLDV CALC-SCNC: 0.5 MMOL/L — SIGNIFICANT CHANGE UP (ref -2–3)
BASOPHILS # BLD AUTO: 0 K/UL — SIGNIFICANT CHANGE UP (ref 0–0.2)
BASOPHILS NFR BLD AUTO: 0 % — SIGNIFICANT CHANGE UP (ref 0–2)
BILIRUB SERPL-MCNC: 1.2 MG/DL — SIGNIFICANT CHANGE UP (ref 0.2–1.2)
BILIRUB UR-MCNC: NEGATIVE — SIGNIFICANT CHANGE UP
BUN SERPL-MCNC: 52 MG/DL — HIGH (ref 7–23)
C DIFF GDH STL QL: NEGATIVE — SIGNIFICANT CHANGE UP
C DIFF GDH STL QL: SIGNIFICANT CHANGE UP
CA-I SERPL-SCNC: 1.13 MMOL/L — LOW (ref 1.15–1.33)
CALCIUM SERPL-MCNC: 8.7 MG/DL — SIGNIFICANT CHANGE UP (ref 8.4–10.5)
CHLORIDE BLDV-SCNC: 101 MMOL/L — SIGNIFICANT CHANGE UP (ref 96–108)
CHLORIDE SERPL-SCNC: 100 MMOL/L — SIGNIFICANT CHANGE UP (ref 96–108)
CO2 BLDV-SCNC: 27 MMOL/L — HIGH (ref 22–26)
CO2 SERPL-SCNC: 20 MMOL/L — LOW (ref 22–31)
COLOR SPEC: YELLOW — SIGNIFICANT CHANGE UP
CREAT SERPL-MCNC: 2.39 MG/DL — HIGH (ref 0.5–1.3)
DIFF PNL FLD: NEGATIVE — SIGNIFICANT CHANGE UP
EGFR: 30 ML/MIN/1.73M2 — LOW
ELLIPTOCYTES BLD QL SMEAR: SLIGHT — SIGNIFICANT CHANGE UP
EOSINOPHIL # BLD AUTO: 0 K/UL — SIGNIFICANT CHANGE UP (ref 0–0.5)
EOSINOPHIL NFR BLD AUTO: 0 % — SIGNIFICANT CHANGE UP (ref 0–6)
EPI CELLS # UR: 0 /HPF — SIGNIFICANT CHANGE UP
GAS PNL BLDV: 134 MMOL/L — LOW (ref 136–145)
GAS PNL BLDV: SIGNIFICANT CHANGE UP
GLUCOSE BLDC GLUCOMTR-MCNC: 206 MG/DL — HIGH (ref 70–99)
GLUCOSE BLDC GLUCOMTR-MCNC: 207 MG/DL — HIGH (ref 70–99)
GLUCOSE BLDV-MCNC: 162 MG/DL — HIGH (ref 70–99)
GLUCOSE SERPL-MCNC: 151 MG/DL — HIGH (ref 70–99)
GLUCOSE UR QL: ABNORMAL
GRAM STN FLD: SIGNIFICANT CHANGE UP
HCO3 BLDV-SCNC: 26 MMOL/L — SIGNIFICANT CHANGE UP (ref 22–29)
HCT VFR BLD CALC: 45.4 % — SIGNIFICANT CHANGE UP (ref 39–50)
HCT VFR BLDA CALC: 40 % — SIGNIFICANT CHANGE UP (ref 39–51)
HGB BLD CALC-MCNC: 13.4 G/DL — SIGNIFICANT CHANGE UP (ref 12.6–17.4)
HGB BLD-MCNC: 13.3 G/DL — SIGNIFICANT CHANGE UP (ref 13–17)
HYALINE CASTS # UR AUTO: 5 /LPF — HIGH (ref 0–2)
KETONES UR-MCNC: NEGATIVE — SIGNIFICANT CHANGE UP
LACTATE BLDV-MCNC: 2.5 MMOL/L — HIGH (ref 0.5–2)
LEUKOCYTE ESTERASE UR-ACNC: NEGATIVE — SIGNIFICANT CHANGE UP
LIDOCAIN IGE QN: 21 U/L — SIGNIFICANT CHANGE UP (ref 7–60)
LYMPHOCYTES # BLD AUTO: 0.5 K/UL — LOW (ref 1–3.3)
LYMPHOCYTES # BLD AUTO: 2.7 % — LOW (ref 13–44)
MANUAL SMEAR VERIFICATION: SIGNIFICANT CHANGE UP
MCHC RBC-ENTMCNC: 22.6 PG — LOW (ref 27–34)
MCHC RBC-ENTMCNC: 29.3 GM/DL — LOW (ref 32–36)
MCV RBC AUTO: 77.2 FL — LOW (ref 80–100)
METAMYELOCYTES # FLD: 0.9 % — HIGH (ref 0–0)
MICROCYTES BLD QL: SLIGHT — SIGNIFICANT CHANGE UP
MONOCYTES # BLD AUTO: 0.82 K/UL — SIGNIFICANT CHANGE UP (ref 0–0.9)
MONOCYTES NFR BLD AUTO: 4.4 % — SIGNIFICANT CHANGE UP (ref 2–14)
MYELOCYTES NFR BLD: 0.9 % — HIGH (ref 0–0)
NEUTROPHILS # BLD AUTO: 16.95 K/UL — HIGH (ref 1.8–7.4)
NEUTROPHILS NFR BLD AUTO: 91.1 % — HIGH (ref 43–77)
NITRITE UR-MCNC: NEGATIVE — SIGNIFICANT CHANGE UP
OVALOCYTES BLD QL SMEAR: SLIGHT — SIGNIFICANT CHANGE UP
PCO2 BLDV: 44 MMHG — SIGNIFICANT CHANGE UP (ref 42–55)
PH BLDV: 7.38 — SIGNIFICANT CHANGE UP (ref 7.32–7.43)
PH UR: 5.5 — SIGNIFICANT CHANGE UP (ref 5–8)
PLAT MORPH BLD: NORMAL — SIGNIFICANT CHANGE UP
PLATELET # BLD AUTO: 178 K/UL — SIGNIFICANT CHANGE UP (ref 150–400)
PO2 BLDV: 34 MMHG — SIGNIFICANT CHANGE UP (ref 25–45)
POIKILOCYTOSIS BLD QL AUTO: SLIGHT — SIGNIFICANT CHANGE UP
POLYCHROMASIA BLD QL SMEAR: SLIGHT — SIGNIFICANT CHANGE UP
POTASSIUM BLDV-SCNC: 3.4 MMOL/L — LOW (ref 3.5–5.1)
POTASSIUM SERPL-MCNC: 3.4 MMOL/L — LOW (ref 3.5–5.3)
POTASSIUM SERPL-SCNC: 3.4 MMOL/L — LOW (ref 3.5–5.3)
PROT SERPL-MCNC: 7.5 G/DL — SIGNIFICANT CHANGE UP (ref 6–8.3)
PROT UR-MCNC: ABNORMAL
RAPID RVP RESULT: SIGNIFICANT CHANGE UP
RBC # BLD: 5.88 M/UL — HIGH (ref 4.2–5.8)
RBC # FLD: 19.9 % — HIGH (ref 10.3–14.5)
RBC BLD AUTO: ABNORMAL
RBC CASTS # UR COMP ASSIST: 2 /HPF — SIGNIFICANT CHANGE UP (ref 0–4)
SAO2 % BLDV: 51.6 % — LOW (ref 67–88)
SARS-COV-2 RNA SPEC QL NAA+PROBE: SIGNIFICANT CHANGE UP
SODIUM SERPL-SCNC: 139 MMOL/L — SIGNIFICANT CHANGE UP (ref 135–145)
SP GR SPEC: 1.02 — SIGNIFICANT CHANGE UP (ref 1.01–1.02)
SPECIMEN SOURCE: SIGNIFICANT CHANGE UP
TROPONIN T, HIGH SENSITIVITY RESULT: 76 NG/L — HIGH (ref 0–51)
UROBILINOGEN FLD QL: NEGATIVE — SIGNIFICANT CHANGE UP
WBC # BLD: 18.61 K/UL — HIGH (ref 3.8–10.5)
WBC # FLD AUTO: 18.61 K/UL — HIGH (ref 3.8–10.5)
WBC UR QL: 1 /HPF — SIGNIFICANT CHANGE UP (ref 0–5)

## 2023-05-28 PROCEDURE — 71045 X-RAY EXAM CHEST 1 VIEW: CPT | Mod: 26

## 2023-05-28 PROCEDURE — 74176 CT ABD & PELVIS W/O CONTRAST: CPT | Mod: 26,MA

## 2023-05-28 PROCEDURE — 99285 EMERGENCY DEPT VISIT HI MDM: CPT | Mod: FS

## 2023-05-28 PROCEDURE — 71250 CT THORAX DX C-: CPT | Mod: 26,MA

## 2023-05-28 PROCEDURE — 99223 1ST HOSP IP/OBS HIGH 75: CPT

## 2023-05-28 RX ORDER — CLOPIDOGREL BISULFATE 75 MG/1
75 TABLET, FILM COATED ORAL DAILY
Refills: 0 | Status: DISCONTINUED | OUTPATIENT
Start: 2023-05-28 | End: 2023-06-03

## 2023-05-28 RX ORDER — BUMETANIDE 0.25 MG/ML
2 INJECTION INTRAMUSCULAR; INTRAVENOUS
Refills: 0 | DISCHARGE

## 2023-05-28 RX ORDER — FAMOTIDINE 10 MG/ML
20 INJECTION INTRAVENOUS ONCE
Refills: 0 | Status: COMPLETED | OUTPATIENT
Start: 2023-05-28 | End: 2023-05-28

## 2023-05-28 RX ORDER — BUDESONIDE AND FORMOTEROL FUMARATE DIHYDRATE 160; 4.5 UG/1; UG/1
2 AEROSOL RESPIRATORY (INHALATION)
Refills: 0 | Status: DISCONTINUED | OUTPATIENT
Start: 2023-05-28 | End: 2023-05-31

## 2023-05-28 RX ORDER — ACETAMINOPHEN 500 MG
650 TABLET ORAL ONCE
Refills: 0 | Status: COMPLETED | OUTPATIENT
Start: 2023-05-28 | End: 2023-05-28

## 2023-05-28 RX ORDER — PANTOPRAZOLE SODIUM 20 MG/1
1 TABLET, DELAYED RELEASE ORAL
Refills: 0 | DISCHARGE

## 2023-05-28 RX ORDER — SOD,AMMONIUM,POTASSIUM LACTATE
1 CREAM (GRAM) TOPICAL
Refills: 0 | Status: DISCONTINUED | OUTPATIENT
Start: 2023-05-28 | End: 2023-06-03

## 2023-05-28 RX ORDER — INSULIN GLARGINE 100 [IU]/ML
50 INJECTION, SOLUTION SUBCUTANEOUS
Refills: 0 | DISCHARGE

## 2023-05-28 RX ORDER — EMPAGLIFLOZIN 10 MG/1
1 TABLET, FILM COATED ORAL
Refills: 0 | DISCHARGE

## 2023-05-28 RX ORDER — LANOLIN ALCOHOL/MO/W.PET/CERES
3 CREAM (GRAM) TOPICAL AT BEDTIME
Refills: 0 | Status: DISCONTINUED | OUTPATIENT
Start: 2023-05-28 | End: 2023-06-03

## 2023-05-28 RX ORDER — METOPROLOL TARTRATE 50 MG
1 TABLET ORAL
Qty: 0 | Refills: 0 | DISCHARGE

## 2023-05-28 RX ORDER — ASPIRIN/CALCIUM CARB/MAGNESIUM 324 MG
81 TABLET ORAL DAILY
Refills: 0 | Status: DISCONTINUED | OUTPATIENT
Start: 2023-05-28 | End: 2023-06-03

## 2023-05-28 RX ORDER — HEPARIN SODIUM 5000 [USP'U]/ML
5000 INJECTION INTRAVENOUS; SUBCUTANEOUS EVERY 8 HOURS
Refills: 0 | Status: DISCONTINUED | OUTPATIENT
Start: 2023-05-28 | End: 2023-06-03

## 2023-05-28 RX ORDER — BUDESONIDE AND FORMOTEROL FUMARATE DIHYDRATE 160; 4.5 UG/1; UG/1
2 AEROSOL RESPIRATORY (INHALATION)
Refills: 0 | DISCHARGE

## 2023-05-28 RX ORDER — CEFAZOLIN SODIUM 1 G
1000 VIAL (EA) INJECTION EVERY 8 HOURS
Refills: 0 | Status: DISCONTINUED | OUTPATIENT
Start: 2023-05-28 | End: 2023-05-29

## 2023-05-28 RX ORDER — BUMETANIDE 0.25 MG/ML
4 INJECTION INTRAMUSCULAR; INTRAVENOUS ONCE
Refills: 0 | Status: COMPLETED | OUTPATIENT
Start: 2023-05-28 | End: 2023-05-28

## 2023-05-28 RX ORDER — EPLERENONE 50 MG/1
1 TABLET, FILM COATED ORAL
Refills: 0 | DISCHARGE

## 2023-05-28 RX ORDER — INSULIN LISPRO 100/ML
VIAL (ML) SUBCUTANEOUS
Refills: 0 | Status: DISCONTINUED | OUTPATIENT
Start: 2023-05-28 | End: 2023-06-01

## 2023-05-28 RX ORDER — BUMETANIDE 0.25 MG/ML
2 INJECTION INTRAMUSCULAR; INTRAVENOUS
Refills: 0 | Status: DISCONTINUED | OUTPATIENT
Start: 2023-05-28 | End: 2023-05-30

## 2023-05-28 RX ORDER — POTASSIUM CHLORIDE 20 MEQ
40 PACKET (EA) ORAL ONCE
Refills: 0 | Status: COMPLETED | OUTPATIENT
Start: 2023-05-28 | End: 2023-05-28

## 2023-05-28 RX ORDER — ALBUTEROL 90 UG/1
2 AEROSOL, METERED ORAL
Refills: 0 | DISCHARGE

## 2023-05-28 RX ORDER — CEFAZOLIN SODIUM 1 G
1000 VIAL (EA) INJECTION ONCE
Refills: 0 | Status: COMPLETED | OUTPATIENT
Start: 2023-05-28 | End: 2023-05-28

## 2023-05-28 RX ORDER — ALPRAZOLAM 0.25 MG
1 TABLET ORAL
Refills: 0 | DISCHARGE

## 2023-05-28 RX ORDER — DEXTROSE 50 % IN WATER 50 %
25 SYRINGE (ML) INTRAVENOUS ONCE
Refills: 0 | Status: DISCONTINUED | OUTPATIENT
Start: 2023-05-28 | End: 2023-06-03

## 2023-05-28 RX ORDER — ACETAMINOPHEN 500 MG
650 TABLET ORAL EVERY 6 HOURS
Refills: 0 | Status: DISCONTINUED | OUTPATIENT
Start: 2023-05-28 | End: 2023-06-03

## 2023-05-28 RX ORDER — TAMSULOSIN HYDROCHLORIDE 0.4 MG/1
1 CAPSULE ORAL
Refills: 0 | DISCHARGE

## 2023-05-28 RX ORDER — CEFAZOLIN SODIUM 1 G
2000 VIAL (EA) INJECTION ONCE
Refills: 0 | Status: COMPLETED | OUTPATIENT
Start: 2023-05-28 | End: 2023-05-28

## 2023-05-28 RX ORDER — SACUBITRIL AND VALSARTAN 24; 26 MG/1; MG/1
1 TABLET, FILM COATED ORAL
Refills: 0 | DISCHARGE

## 2023-05-28 RX ORDER — ROSUVASTATIN CALCIUM 5 MG/1
1 TABLET ORAL
Refills: 0 | DISCHARGE

## 2023-05-28 RX ORDER — CLOPIDOGREL BISULFATE 75 MG/1
1 TABLET, FILM COATED ORAL
Refills: 0 | DISCHARGE

## 2023-05-28 RX ORDER — PANTOPRAZOLE SODIUM 20 MG/1
40 TABLET, DELAYED RELEASE ORAL
Refills: 0 | Status: DISCONTINUED | OUTPATIENT
Start: 2023-05-28 | End: 2023-06-03

## 2023-05-28 RX ORDER — ACETAMINOPHEN 500 MG
1000 TABLET ORAL ONCE
Refills: 0 | Status: COMPLETED | OUTPATIENT
Start: 2023-05-28 | End: 2023-05-28

## 2023-05-28 RX ORDER — INSULIN GLARGINE 100 [IU]/ML
25 INJECTION, SOLUTION SUBCUTANEOUS AT BEDTIME
Refills: 0 | Status: DISCONTINUED | OUTPATIENT
Start: 2023-05-28 | End: 2023-06-01

## 2023-05-28 RX ORDER — ONDANSETRON 8 MG/1
4 TABLET, FILM COATED ORAL EVERY 8 HOURS
Refills: 0 | Status: DISCONTINUED | OUTPATIENT
Start: 2023-05-28 | End: 2023-06-03

## 2023-05-28 RX ORDER — CEFAZOLIN SODIUM 1 G
VIAL (EA) INJECTION
Refills: 0 | Status: DISCONTINUED | OUTPATIENT
Start: 2023-05-28 | End: 2023-05-29

## 2023-05-28 RX ORDER — DEXTROSE 50 % IN WATER 50 %
15 SYRINGE (ML) INTRAVENOUS ONCE
Refills: 0 | Status: DISCONTINUED | OUTPATIENT
Start: 2023-05-28 | End: 2023-06-03

## 2023-05-28 RX ORDER — METOPROLOL TARTRATE 50 MG
100 TABLET ORAL EVERY 12 HOURS
Refills: 0 | Status: DISCONTINUED | OUTPATIENT
Start: 2023-05-28 | End: 2023-06-02

## 2023-05-28 RX ORDER — ASPIRIN/CALCIUM CARB/MAGNESIUM 324 MG
1 TABLET ORAL
Refills: 0 | DISCHARGE

## 2023-05-28 RX ORDER — ROSUVASTATIN CALCIUM 5 MG/1
1 TABLET ORAL
Qty: 0 | Refills: 0 | DISCHARGE

## 2023-05-28 RX ORDER — ALPRAZOLAM 0.25 MG
0.25 TABLET ORAL DAILY
Refills: 0 | Status: DISCONTINUED | OUTPATIENT
Start: 2023-05-28 | End: 2023-06-03

## 2023-05-28 RX ORDER — SODIUM CHLORIDE 9 MG/ML
500 INJECTION INTRAMUSCULAR; INTRAVENOUS; SUBCUTANEOUS ONCE
Refills: 0 | Status: COMPLETED | OUTPATIENT
Start: 2023-05-28 | End: 2023-05-28

## 2023-05-28 RX ORDER — SODIUM CHLORIDE 9 MG/ML
1000 INJECTION, SOLUTION INTRAVENOUS
Refills: 0 | Status: DISCONTINUED | OUTPATIENT
Start: 2023-05-28 | End: 2023-06-03

## 2023-05-28 RX ORDER — INSULIN LISPRO 100/ML
20 VIAL (ML) SUBCUTANEOUS
Refills: 0 | DISCHARGE

## 2023-05-28 RX ORDER — METOPROLOL TARTRATE 50 MG
1 TABLET ORAL
Refills: 0 | DISCHARGE

## 2023-05-28 RX ORDER — ATORVASTATIN CALCIUM 80 MG/1
80 TABLET, FILM COATED ORAL AT BEDTIME
Refills: 0 | Status: DISCONTINUED | OUTPATIENT
Start: 2023-05-28 | End: 2023-06-03

## 2023-05-28 RX ORDER — ALBUTEROL 90 UG/1
2 AEROSOL, METERED ORAL EVERY 6 HOURS
Refills: 0 | Status: DISCONTINUED | OUTPATIENT
Start: 2023-05-28 | End: 2023-06-03

## 2023-05-28 RX ORDER — NICOTINE POLACRILEX 2 MG
1 GUM BUCCAL ONCE
Refills: 0 | Status: COMPLETED | OUTPATIENT
Start: 2023-05-28 | End: 2023-05-28

## 2023-05-28 RX ORDER — CEFEPIME 1 G/1
1000 INJECTION, POWDER, FOR SOLUTION INTRAMUSCULAR; INTRAVENOUS EVERY 24 HOURS
Refills: 0 | Status: DISCONTINUED | OUTPATIENT
Start: 2023-05-28 | End: 2023-05-28

## 2023-05-28 RX ORDER — TAMSULOSIN HYDROCHLORIDE 0.4 MG/1
1 CAPSULE ORAL
Qty: 0 | Refills: 0 | DISCHARGE

## 2023-05-28 RX ORDER — TAMSULOSIN HYDROCHLORIDE 0.4 MG/1
0.4 CAPSULE ORAL AT BEDTIME
Refills: 0 | Status: DISCONTINUED | OUTPATIENT
Start: 2023-05-28 | End: 2023-06-03

## 2023-05-28 RX ADMIN — FAMOTIDINE 20 MILLIGRAM(S): 10 INJECTION INTRAVENOUS at 12:09

## 2023-05-28 RX ADMIN — Medication 650 MILLIGRAM(S): at 08:04

## 2023-05-28 RX ADMIN — HEPARIN SODIUM 5000 UNIT(S): 5000 INJECTION INTRAVENOUS; SUBCUTANEOUS at 21:39

## 2023-05-28 RX ADMIN — Medication 100 MILLIGRAM(S): at 18:21

## 2023-05-28 RX ADMIN — Medication 40 MILLIEQUIVALENT(S): at 09:36

## 2023-05-28 RX ADMIN — Medication 650 MILLIGRAM(S): at 10:48

## 2023-05-28 RX ADMIN — Medication 400 MILLIGRAM(S): at 13:25

## 2023-05-28 RX ADMIN — BUDESONIDE AND FORMOTEROL FUMARATE DIHYDRATE 2 PUFF(S): 160; 4.5 AEROSOL RESPIRATORY (INHALATION) at 18:21

## 2023-05-28 RX ADMIN — Medication 2: at 18:31

## 2023-05-28 RX ADMIN — INSULIN GLARGINE 25 UNIT(S): 100 INJECTION, SOLUTION SUBCUTANEOUS at 21:40

## 2023-05-28 RX ADMIN — Medication 100 MILLIGRAM(S): at 18:20

## 2023-05-28 RX ADMIN — ONDANSETRON 4 MILLIGRAM(S): 8 TABLET, FILM COATED ORAL at 23:12

## 2023-05-28 RX ADMIN — BUMETANIDE 4 MILLIGRAM(S): 0.25 INJECTION INTRAMUSCULAR; INTRAVENOUS at 12:11

## 2023-05-28 RX ADMIN — Medication 1 PATCH: at 21:52

## 2023-05-28 RX ADMIN — Medication 1000 MILLIGRAM(S): at 17:10

## 2023-05-28 RX ADMIN — SODIUM CHLORIDE 500 MILLILITER(S): 9 INJECTION INTRAMUSCULAR; INTRAVENOUS; SUBCUTANEOUS at 08:04

## 2023-05-28 RX ADMIN — ATORVASTATIN CALCIUM 80 MILLIGRAM(S): 80 TABLET, FILM COATED ORAL at 21:38

## 2023-05-28 RX ADMIN — Medication 100 MILLIGRAM(S): at 09:37

## 2023-05-28 RX ADMIN — Medication 100 MILLIGRAM(S): at 21:47

## 2023-05-28 RX ADMIN — TAMSULOSIN HYDROCHLORIDE 0.4 MILLIGRAM(S): 0.4 CAPSULE ORAL at 21:39

## 2023-05-28 RX ADMIN — Medication 1 APPLICATION(S): at 21:47

## 2023-05-28 NOTE — ED PROVIDER NOTE - CLINICAL SUMMARY MEDICAL DECISION MAKING FREE TEXT BOX
Adult male w above complex medical hx previously admitted for sepsis/micu now pw temp 100.4 oral(refused recatal temp) and nvd,  Concerns for recurrent infection, Check xr,labs, ua, ct chest/abd. tx w ivf will start with 500ns in light of hx of chf, reassess and probable admit  Reji Casas MD, Facep

## 2023-05-28 NOTE — H&P ADULT - PROBLEM SELECTOR PLAN 4
on lantus 50 u bedtime and lispro as well  - given poor po intake, halve lantus to 25 u bedtime  - monitor fs tidac and c/w ISS

## 2023-05-28 NOTE — ED ADULT NURSE NOTE - OBJECTIVE STATEMENT
62Y M AXO 3 PMH of COPD, AIC, DM, and cardiomyopathy and no pertinent PSH presented to the ED via EMS c/o vomiting and diarrhea since 3 am this morning. Pt states his son and daughter in law have a "viral infection." Pt states that last night he ate "chicken." Pt reports that he had a foot wound debridement approximately 3 days ago. Upon arrival to the ED, the pt is well appearing, diaphoretic, has bilateral, even, and unlabored chest rise, and airway is patent. Upon assessment, pt has even and bilateral peripheral pulses, ROM, and abdomen is soft, non-tender, and non-distended. Pt denies chest pain, SOB, headaches, dizziness, lightheadedness, urinary symptoms, and black or bloody stools. Comfort and safety provided. IV access obtained, bed in lowest position, call bell within reach, and side rails up.

## 2023-05-28 NOTE — H&P ADULT - NSHPPHYSICALEXAM_GEN_ALL_CORE
Vital Signs Last 24 Hrs  T(C): 38.6 (28 May 2023 16:40), Max: 38.6 (28 May 2023 16:40)  T(F): 101.5 (28 May 2023 16:40), Max: 101.5 (28 May 2023 16:40)  HR: 109 (28 May 2023 16:40) (95 - 111)  BP: 96/52 (28 May 2023 16:40) (96/52 - 100/64)  BP(mean): 65 (28 May 2023 16:40) (65 - 80)  RR: 20 (28 May 2023 16:40) (20 - 20)  SpO2: 95% (28 May 2023 16:40) (92% - 98%)    Parameters below as of 28 May 2023 16:40  Patient On (Oxygen Delivery Method): room air        PHYSICAL EXAM:  GENERAL:  obese m, ill, in NAD  HEAD:  NCAT  EYES: conjunctiva clear  NECK: Supple  CHEST/LUNG: CTA B/L. No w/r/r.  HEART: Reg rate. Normal S1, S2. No m/r/g.   ABDOMEN: SNTND  EXTREMITIES:  2+ Peripheral Pulses, No clubbing, cyanosis  +pvd, r foot w xeroderma, r chronic wound ulcer on r foot  b/l tense 1+ LE edema  PSYCH: AAOx3, appropriate affect

## 2023-05-28 NOTE — ED PROVIDER NOTE - OBJECTIVE STATEMENT
Private Physician Garrison Boykin  62y m pmh CHFrEF, AICD, ischemic cardiomyopathy, MI/CAD SP ptca w stent, COPD, DMT2, BPH, GERD, HLD, LUIS ALFREDO, Osteomyelitis of spine. PT comes to ED Via ems for c/o Private Physician Garrison Boykin  62y m pmh CHFrEF, AICD, ischemic cardiomyopathy, MI/CAD SP ptca w stent, COPD, DMT2, BPH, GERD, HLD, LUIS ALFREDO,(noncompliant on cpap) PT comes to ED Via ems for c/o. PT seen by wound care three days ago for chronic wound rt foot debrided and cauterized. Now pt comes to ed c/o nvd. No fever at home. no cp. shortness of breath,palps.dizzines. abd pain. hematemesis. Diarrhea was brown watery nonbloody. +Ill contacts son/daughter in law w "virus" Private Physician Garrison Boykin  62y m pmh CHFrEF, AICD, ischemic cardiomyopathy, MI/CAD SP ptca w stent, COPD, DMT2, BPH, GERD, HLD, LUIS ALFREDO,(noncompliant on cpap) PT comes to ED Via ems for c/o. PT seen by wound care three days ago for chronic wound rt foot debrided and cauterized. Now pt comes to ed c/o nvd. No fever at home. no cp. shortness of breath,palps.dizzines. abd pain. hematemesis. Diarrhea was brown watery nonbloody. +Ill contacts son/daughter in law w "virus" Has hx of low bp

## 2023-05-28 NOTE — ED PROVIDER NOTE - PROGRESS NOTE DETAILS
Patient evaluated at bedside. Patient is hypoxic to 89%. Placed on 2L NC 02. Patient saturating at 96%. Abdomen is soft and non-tender. Patient has no complaints of pain at this time. Not actively vomiting. DP pulses obtained via doppler to b/l feet. Kayla Curtis PA-C

## 2023-05-28 NOTE — H&P ADULT - NSHPLABSRESULTS_GEN_ALL_CORE
LABS:                         13.3   18.61 )-----------( 178      ( 28 May 2023 08:23 )             45.4         139  |  100  |  52<H>  ----------------------------<  151<H>  3.4<L>   |  20<L>  |  2.39<H>    Ca    8.7      28 May 2023 08:23    TPro  7.5  /  Alb  3.8  /  TBili  1.2  /  DBili  x   /  AST  33  /  ALT  27  /  AlkPhos  247<H>        Urinalysis Basic - ( 28 May 2023 09:31 )    Color: Yellow / Appearance: Clear / S.016 / pH: x  Gluc: x / Ketone: Negative  / Bili: Negative / Urobili: Negative   Blood: x / Protein: 30 mg/dL / Nitrite: Negative   Leuk Esterase: Negative / RBC: 2 /hpf / WBC 1 /HPF   Sq Epi: x / Non Sq Epi: x / Bacteria: Negative              Records reviewed from prior hospitalization 3/2023 for cellulitis.  CHEST:  LUNGS, PLEURA, AND LARGE AIRWAYS: Patent central airways. Emphysematous   changes. Multiple bilateral calcified granulomas similar when compared to   prior exam. Small right-sided pleural effusion with adjacent compressive   atelectasis.  VESSELS: Coronary artery and aortic calcifications. Coronary stents in   place.  HEART: Cardiomegaly. No pericardial effusion.  MEDIASTINUM AND BOB: Largely unchanged mediastinal lymphadenopathy. For   reference a prevascular lymph node measures 1.6 x 1.2 cm previously 1.7 x   1.1 cm (2, 30).  CHEST WALL AND LOWER NECK: Left chest wall AICD.    ABDOMEN AND PELVIS:  LIVER: Hepatomegaly with steatosis.  BILE DUCTS: Normal caliber.  GALLBLADDER: Within normal limits.  SPLEEN: Splenomegaly.  PANCREAS: Within normal limits.  ADRENALS: Within normal limits.  KIDNEYS/URETERS: Similar-appearing severe chronic right-sided hydroureter   with renal cortical thinning. Punctate nonobstructing left renal   stones.No obstructing renal calculi bilaterally. No left-sided   hydronephrosis.    BLADDER: Mild bladder wall thickening. Previously visualized bladder   diverticula not well appreciated on this scan.  REPRODUCTIVE ORGANS: Prostate is enlarged.    BOWEL: Colonic diverticulosis without diverticulitis. No bowel   obstruction. Status post appendectomy. No colonic wall thickening to   suggest a colitis.  PERITONEUM: No ascites.  VESSELS: Atherosclerotic changes.  RETROPERITONEUM/LYMPH NODES: 1.5 cm para-aortic lymph node unchanged from   prior exam with additional scattered subcentimeter mediastinal lymph   nodes.  ABDOMINAL WALL: Within normal limits.  BONES: Degenerative changes.    IMPRESSION:  Small right-sided pleural effusion with adjacent compressive atelectasis   increased when compared to prior exam from 3/1/2023.    Mild bladder wall thickening. Recommend correlation with urinalysis.    Unchanged intra-abdominal findings when compared to 3/2/2023 as described   above.

## 2023-05-28 NOTE — ED PROVIDER NOTE - SKIN COLOR
Andrea lower ext chronic venous stasis changes. w erythema warmth, Andrea plantar feet wound w/o signs of infecton

## 2023-05-28 NOTE — ED PROVIDER NOTE - NSICDXPASTMEDICALHX_GEN_ALL_CORE_FT
PAST MEDICAL HISTORY:  2019 novel coronavirus disease (COVID-19)     AICD (automatic cardioverter/defibrillator) present     COVID-19 vaccine series completed     Diabetes     H/O gastroesophageal reflux (GERD)     Heart failure with reduced ejection fraction     History of COPD     History of ischemic cardiomyopathy     Hypertension     Stented coronary artery

## 2023-05-28 NOTE — H&P ADULT - ASSESSMENT
61 yo M PMHx of HFrEF EF 29%, ICM, ICD, IDDM2, COPD, LUIS ALFREDO, CKD3-4 (BL Cr 2.2) p/w 1 day of  N/V/D, 1 week of weakness adm with severe sepsis due to suspected cellulitis

## 2023-05-28 NOTE — H&P ADULT - HISTORY OF PRESENT ILLNESS
63 yo M PMHx of HFrEF EF 29%, ICM, ICD, IDDM2, COPD, LUIS ALFREDO, CKD3-4 p/w 1 day of  N/V/D. Hx also obtained from wife at bedside. Pt reports feeling unwell for the past week, recently saw his wound specialist for a chronic r foot wound and had the wound debrided. Since then, he has not felt well and with chills at home. On day of presentation to the ED, pt c/o multiple episodes of nausea, nbnb emesis, and nonbloody diarrhea. Denies any sick contacts at home or anyone with similar symptoms including children. Wife feels this presentation is identical to prior admissions for sepsis in the setting of leg cellulitis after wound debridement. Pt denies any recent trauma to foot, any cp, sob.    In the ED VS reviewed stable - temp up to 101.5F, , Desat to 89% on RA  Meds received - ancef 2g, NS 500cc, tylenol 650 mg, 1 g IV, bumex 4 mg, pepcid 20 mg, kcl 40 meq

## 2023-05-28 NOTE — H&P ADULT - PROBLEM SELECTOR PLAN 1
suspect possible RLE cellulitis in setting of chronic r foot wound. UA neg.  - ancef 1 g q8h (has multiple abx allergies but tolerated this in the past)  - CT leg b/l ordered   - wound c/s  - lac hydrin cream bid to foot  - tylenol prn  - fu bld cx results  - hold entresto in setting of sepsis  - trend lactate

## 2023-05-28 NOTE — ED ADULT NURSE REASSESSMENT NOTE - NS ED NURSE REASSESS COMMENT FT1
Pt is awake, alert, and speaking in full coherent sentences. Vital signs stable. Pt is resting comfortably in stretcher, side rails up, bed in lowest position, and call bell within reach. Pt's wife is at bedside.

## 2023-05-28 NOTE — H&P ADULT - NSHPREVIEWOFSYSTEMS_GEN_ALL_CORE
Review of Systems:   CONSTITUTIONAL: +fever. No  weight loss  EYES: No eye pain, visual disturbances, or discharge  ENMT:  No difficulty hearing, tinnitus, vertigo; No sinus or throat pain  RESPIRATORY: No SOB. No cough, wheezing, chills or hemoptysis  CARDIOVASCULAR: No chest pain, palpitations, dizziness,   +b/l leg swelling  GASTROINTESTINAL: +n/v No abdominal or epigastric pain. No hematemesis; No diarrhea or constipation. No melena or hematochezia.  GENITOURINARY: No dysuria, frequency, hematuria, or incontinence  NEUROLOGICAL: No headaches, memory loss, loss of strength, numbness, or tremors  SKIN: No itching, burning  ENDOCRINE: No heat or cold intolerance; No hair loss  MUSCULOSKELETAL: No joint pain; No muscle, back pain  PSYCHIATRIC: No depression, anxiety, mood swings, or difficulty sleeping  HEME/LYMPH: No easy bruising, or bleeding gums

## 2023-05-28 NOTE — H&P ADULT - PROBLEM SELECTOR PLAN 5
ef 29% previously  - check BNP  - on bumex 4 mg po bid - decr to 2 mg bid for now given decr po intake  - monitor i/o's, daily weights  - hold entresto 2/2 n/v/d  - c/w home metoprolol 100 mg bid for now  - holding eplerenone

## 2023-05-28 NOTE — ED ADULT NURSE REASSESSMENT NOTE - NS ED NURSE REASSESS COMMENT FT1
Pt is awake, alert, and speaking in full coherent sentences. Pt is resting comfortably in stretcher, side rails up, bed in lowest position, and call bell within reach. Pt's wife at bedside. Pt given food, pt able to tolerate food well. Pt admitted and awaiting bed.

## 2023-05-28 NOTE — H&P ADULT - PROBLEM SELECTOR PLAN 2
CTAP unrevealing for n/v/d  may be 2/2 sepsis, viral gi illness. cdiff neg  - check GI PCR  - monitor  - hold off on further ivf given chf hx

## 2023-05-28 NOTE — ED PROVIDER NOTE - NS ED ATTENDING STATEMENT MOD
This was a shared visit with the CANDACE. I reviewed and verified the documentation and independently performed the documented:

## 2023-05-28 NOTE — H&P ADULT - PROBLEM SELECTOR PLAN 6
dvt ppx:hsq  no PT needs  d/w wife at bedside all ques answered  Dispo: pending blood cx, improvement in symptoms

## 2023-05-28 NOTE — ED ADULT NURSE NOTE - CAS EDN DISCHARGE ASSESSMENT
[de-identified] : well healed scar [Midline] : located in midline position [Normal] : orientation to person, place, and time: normal Alert and oriented to person, place and time/Awake

## 2023-05-28 NOTE — ED ADULT NURSE NOTE - NSFALLRISKINTERV_ED_ALL_ED
Assistance OOB with selected safe patient handling equipment if applicable/Communicate fall risk and risk factors to all staff, patient, and family/Provide visual cue: yellow wristband, yellow gown, etc/Reinforce activity limits and safety measures with patient and family/Toileting schedule using arm’s reach rule for commode and bathroom/Call bell, personal items and telephone in reach/Instruct patient to call for assistance before getting out of bed/chair/stretcher/Non-slip footwear applied when patient is off stretcher/[place] to call system/Physically safe environment - no spills, clutter or unnecessary equipment/Purposeful Proactive Rounding/Room/bathroom lighting operational, light cord in reach

## 2023-05-28 NOTE — PATIENT PROFILE ADULT - CENTRAL VENOUS CATHETER/PICC LINE
Patient Education        Ear Infection (Otitis Media) in Babies 0 to 2 Years: Care Instructions  Your Care Instructions    An ear infection may start with a cold and affect the middle ear. This is called otitis media. It can hurt a lot. Children with ear infections often fuss and cry, pull at their ears, and sleep poorly. Ear infections are common in babies and young children. Your doctor may prescribe antibiotics to treat the ear infection. Children under 6 months are usually given an antibiotic. If your child is over 7 months old and the symptoms are mild, antibiotics may not be needed. Your doctor may also recommend medicines to help with fever or pain. Follow-up care is a key part of your child's treatment and safety. Be sure to make and go to all appointments, and call your doctor if your child is having problems. It's also a good idea to know your child's test results and keep a list of the medicines your child takes. How can you care for your child at home? · Give your child acetaminophen (Tylenol) or ibuprofen (Advil, Motrin) for fever, pain, or fussiness. Do not use ibuprofen if your child is less than 6 months old unless the doctor gave you instructions to use it. Be safe with medicines. For children 6 months and older, read and follow all instructions on the label. · If the doctor prescribed antibiotics for your child, give them as directed. Do not stop using them just because your child feels better. Your child needs to take the full course of antibiotics. · Place a warm washcloth on your child's ear for pain. · Try to keep your child resting quietly. Resting will help the body fight the infection. When should you call for help? Call 911 anytime you think your child may need emergency care.  For example, call if:    · Your child is extremely sleepy or hard to wake up.   [facility] your doctor now or seek immediate medical care if:    · Your child seems to be getting much sicker.     · Your child has a new or higher fever.     · Your child's ear pain is getting worse.     · Your child has redness or swelling around or behind the ear.    Watch closely for changes in your child's health, and be sure to contact your doctor if:    · Your child has new or worse discharge from the ear.     · Your child is not getting better after 2 days (48 hours).     · Your child has any new symptoms, such as hearing problems, after the ear infection has cleared. Where can you learn more? Go to http://negin-kyle.info/. Enter V230 in the search box to learn more about \"Ear Infection (Otitis Media) in Babies 0 to 2 Years: Care Instructions. \"  Current as of: October 21, 2018  Content Version: 12.2  © 8126-9720 [employer]. Care instructions adapted under license by [employer] (which disclaims liability or warranty for this information). If you have questions about a medical condition or this instruction, always ask your healthcare professional. [place] [address] any warranty or liability for your use of this information. no

## 2023-05-28 NOTE — H&P ADULT - PROBLEM SELECTOR PLAN 3
likely atelectasis from lethargy/weakness, briefly required NC O2 for 89% RA  - IC 10x/hr  - management of sepsis as above  - supplemental O2 if O2<88% given COPD hx, goal ~94%  - c/w home symbicort and albuterol

## 2023-05-29 DIAGNOSIS — N17.9 ACUTE KIDNEY FAILURE, UNSPECIFIED: ICD-10-CM

## 2023-05-29 LAB
-  STREPTOCOCCUS SP. (NOT GRP A, B OR S PNEUMONIAE): SIGNIFICANT CHANGE UP
A1C WITH ESTIMATED AVERAGE GLUCOSE RESULT: 8.5 % — HIGH (ref 4–5.6)
ALBUMIN SERPL ELPH-MCNC: 3.3 G/DL — SIGNIFICANT CHANGE UP (ref 3.3–5)
ALP SERPL-CCNC: 161 U/L — HIGH (ref 40–120)
ALT FLD-CCNC: 21 U/L — SIGNIFICANT CHANGE UP (ref 10–45)
ANION GAP SERPL CALC-SCNC: 18 MMOL/L — HIGH (ref 5–17)
ANION GAP SERPL CALC-SCNC: 21 MMOL/L — HIGH (ref 5–17)
AST SERPL-CCNC: 25 U/L — SIGNIFICANT CHANGE UP (ref 10–40)
BASOPHILS # BLD AUTO: 0.03 K/UL — SIGNIFICANT CHANGE UP (ref 0–0.2)
BASOPHILS NFR BLD AUTO: 0.2 % — SIGNIFICANT CHANGE UP (ref 0–2)
BILIRUB SERPL-MCNC: 1 MG/DL — SIGNIFICANT CHANGE UP (ref 0.2–1.2)
BUN SERPL-MCNC: 57 MG/DL — HIGH (ref 7–23)
BUN SERPL-MCNC: 57 MG/DL — HIGH (ref 7–23)
CALCIUM SERPL-MCNC: 8.5 MG/DL — SIGNIFICANT CHANGE UP (ref 8.4–10.5)
CALCIUM SERPL-MCNC: 8.5 MG/DL — SIGNIFICANT CHANGE UP (ref 8.4–10.5)
CHLORIDE SERPL-SCNC: 98 MMOL/L — SIGNIFICANT CHANGE UP (ref 96–108)
CHLORIDE SERPL-SCNC: 99 MMOL/L — SIGNIFICANT CHANGE UP (ref 96–108)
CO2 SERPL-SCNC: 17 MMOL/L — LOW (ref 22–31)
CO2 SERPL-SCNC: 18 MMOL/L — LOW (ref 22–31)
CREAT SERPL-MCNC: 2.52 MG/DL — HIGH (ref 0.5–1.3)
CREAT SERPL-MCNC: 2.55 MG/DL — HIGH (ref 0.5–1.3)
CULTURE RESULTS: SIGNIFICANT CHANGE UP
EGFR: 28 ML/MIN/1.73M2 — LOW
EGFR: 28 ML/MIN/1.73M2 — LOW
EOSINOPHIL # BLD AUTO: 0 K/UL — SIGNIFICANT CHANGE UP (ref 0–0.5)
EOSINOPHIL NFR BLD AUTO: 0 % — SIGNIFICANT CHANGE UP (ref 0–6)
ESTIMATED AVERAGE GLUCOSE: 197 MG/DL — HIGH (ref 68–114)
GLUCOSE BLDC GLUCOMTR-MCNC: 201 MG/DL — HIGH (ref 70–99)
GLUCOSE BLDC GLUCOMTR-MCNC: 207 MG/DL — HIGH (ref 70–99)
GLUCOSE BLDC GLUCOMTR-MCNC: 219 MG/DL — HIGH (ref 70–99)
GLUCOSE BLDC GLUCOMTR-MCNC: 273 MG/DL — HIGH (ref 70–99)
GLUCOSE SERPL-MCNC: 223 MG/DL — HIGH (ref 70–99)
GLUCOSE SERPL-MCNC: 225 MG/DL — HIGH (ref 70–99)
GRAM STN FLD: SIGNIFICANT CHANGE UP
HCT VFR BLD CALC: 39.3 % — SIGNIFICANT CHANGE UP (ref 39–50)
HGB BLD-MCNC: 11.8 G/DL — LOW (ref 13–17)
IMM GRANULOCYTES NFR BLD AUTO: 1.9 % — HIGH (ref 0–0.9)
LACTATE SERPL-SCNC: 1.5 MMOL/L — SIGNIFICANT CHANGE UP (ref 0.5–2)
LYMPHOCYTES # BLD AUTO: 0.53 K/UL — LOW (ref 1–3.3)
LYMPHOCYTES # BLD AUTO: 3.7 % — LOW (ref 13–44)
MAGNESIUM SERPL-MCNC: 2 MG/DL — SIGNIFICANT CHANGE UP (ref 1.6–2.6)
MCHC RBC-ENTMCNC: 22.6 PG — LOW (ref 27–34)
MCHC RBC-ENTMCNC: 30 GM/DL — LOW (ref 32–36)
MCV RBC AUTO: 75.3 FL — LOW (ref 80–100)
METHOD TYPE: SIGNIFICANT CHANGE UP
MONOCYTES # BLD AUTO: 0.71 K/UL — SIGNIFICANT CHANGE UP (ref 0–0.9)
MONOCYTES NFR BLD AUTO: 5 % — SIGNIFICANT CHANGE UP (ref 2–14)
NEUTROPHILS # BLD AUTO: 12.65 K/UL — HIGH (ref 1.8–7.4)
NEUTROPHILS NFR BLD AUTO: 89.2 % — HIGH (ref 43–77)
NRBC # BLD: 0 /100 WBCS — SIGNIFICANT CHANGE UP (ref 0–0)
NT-PROBNP SERPL-SCNC: HIGH PG/ML (ref 0–300)
PLATELET # BLD AUTO: 151 K/UL — SIGNIFICANT CHANGE UP (ref 150–400)
POTASSIUM SERPL-MCNC: 3.5 MMOL/L — SIGNIFICANT CHANGE UP (ref 3.5–5.3)
POTASSIUM SERPL-MCNC: 3.7 MMOL/L — SIGNIFICANT CHANGE UP (ref 3.5–5.3)
POTASSIUM SERPL-SCNC: 3.5 MMOL/L — SIGNIFICANT CHANGE UP (ref 3.5–5.3)
POTASSIUM SERPL-SCNC: 3.7 MMOL/L — SIGNIFICANT CHANGE UP (ref 3.5–5.3)
PROT SERPL-MCNC: 6.9 G/DL — SIGNIFICANT CHANGE UP (ref 6–8.3)
RBC # BLD: 5.22 M/UL — SIGNIFICANT CHANGE UP (ref 4.2–5.8)
RBC # FLD: 19.7 % — HIGH (ref 10.3–14.5)
SODIUM SERPL-SCNC: 135 MMOL/L — SIGNIFICANT CHANGE UP (ref 135–145)
SODIUM SERPL-SCNC: 136 MMOL/L — SIGNIFICANT CHANGE UP (ref 135–145)
SPECIMEN SOURCE: SIGNIFICANT CHANGE UP
SPECIMEN SOURCE: SIGNIFICANT CHANGE UP
WBC # BLD: 14.19 K/UL — HIGH (ref 3.8–10.5)
WBC # FLD AUTO: 14.19 K/UL — HIGH (ref 3.8–10.5)

## 2023-05-29 PROCEDURE — 99233 SBSQ HOSP IP/OBS HIGH 50: CPT

## 2023-05-29 RX ORDER — CEFAZOLIN SODIUM 1 G
2000 VIAL (EA) INJECTION EVERY 8 HOURS
Refills: 0 | Status: DISCONTINUED | OUTPATIENT
Start: 2023-05-29 | End: 2023-05-30

## 2023-05-29 RX ORDER — NICOTINE POLACRILEX 2 MG
1 GUM BUCCAL ONCE
Refills: 0 | Status: COMPLETED | OUTPATIENT
Start: 2023-05-29 | End: 2023-05-29

## 2023-05-29 RX ADMIN — Medication 1 APPLICATION(S): at 17:42

## 2023-05-29 RX ADMIN — HEPARIN SODIUM 5000 UNIT(S): 5000 INJECTION INTRAVENOUS; SUBCUTANEOUS at 12:48

## 2023-05-29 RX ADMIN — ONDANSETRON 4 MILLIGRAM(S): 8 TABLET, FILM COATED ORAL at 08:18

## 2023-05-29 RX ADMIN — Medication 3: at 12:49

## 2023-05-29 RX ADMIN — Medication 2: at 08:23

## 2023-05-29 RX ADMIN — Medication 100 MILLIGRAM(S): at 12:49

## 2023-05-29 RX ADMIN — INSULIN GLARGINE 25 UNIT(S): 100 INJECTION, SOLUTION SUBCUTANEOUS at 22:15

## 2023-05-29 RX ADMIN — Medication 81 MILLIGRAM(S): at 11:19

## 2023-05-29 RX ADMIN — BUDESONIDE AND FORMOTEROL FUMARATE DIHYDRATE 2 PUFF(S): 160; 4.5 AEROSOL RESPIRATORY (INHALATION) at 17:42

## 2023-05-29 RX ADMIN — BUDESONIDE AND FORMOTEROL FUMARATE DIHYDRATE 2 PUFF(S): 160; 4.5 AEROSOL RESPIRATORY (INHALATION) at 05:16

## 2023-05-29 RX ADMIN — Medication 2: at 17:42

## 2023-05-29 RX ADMIN — ONDANSETRON 4 MILLIGRAM(S): 8 TABLET, FILM COATED ORAL at 16:42

## 2023-05-29 RX ADMIN — Medication 100 MILLIGRAM(S): at 22:15

## 2023-05-29 RX ADMIN — BUMETANIDE 2 MILLIGRAM(S): 0.25 INJECTION INTRAMUSCULAR; INTRAVENOUS at 05:22

## 2023-05-29 RX ADMIN — ATORVASTATIN CALCIUM 80 MILLIGRAM(S): 80 TABLET, FILM COATED ORAL at 22:14

## 2023-05-29 RX ADMIN — PANTOPRAZOLE SODIUM 40 MILLIGRAM(S): 20 TABLET, DELAYED RELEASE ORAL at 05:16

## 2023-05-29 RX ADMIN — Medication 1 PATCH: at 22:05

## 2023-05-29 RX ADMIN — Medication 100 MILLIGRAM(S): at 05:16

## 2023-05-29 RX ADMIN — CLOPIDOGREL BISULFATE 75 MILLIGRAM(S): 75 TABLET, FILM COATED ORAL at 11:19

## 2023-05-29 RX ADMIN — Medication 1 PATCH: at 22:14

## 2023-05-29 RX ADMIN — Medication 1 APPLICATION(S): at 05:17

## 2023-05-29 RX ADMIN — HEPARIN SODIUM 5000 UNIT(S): 5000 INJECTION INTRAVENOUS; SUBCUTANEOUS at 05:17

## 2023-05-29 RX ADMIN — TAMSULOSIN HYDROCHLORIDE 0.4 MILLIGRAM(S): 0.4 CAPSULE ORAL at 22:14

## 2023-05-29 RX ADMIN — Medication 100 MILLIGRAM(S): at 17:42

## 2023-05-29 RX ADMIN — HEPARIN SODIUM 5000 UNIT(S): 5000 INJECTION INTRAVENOUS; SUBCUTANEOUS at 22:13

## 2023-05-29 RX ADMIN — Medication 100 MILLIGRAM(S): at 05:17

## 2023-05-29 RX ADMIN — BUMETANIDE 2 MILLIGRAM(S): 0.25 INJECTION INTRAMUSCULAR; INTRAVENOUS at 12:48

## 2023-05-29 RX ADMIN — Medication 30 MILLILITER(S): at 03:58

## 2023-05-29 RX ADMIN — Medication 1 PATCH: at 19:32

## 2023-05-29 NOTE — PROGRESS NOTE ADULT - PROBLEM SELECTOR PLAN 5
ef 29% previously  BNP >13K  on bumex 4 mg po bid - continue 2 mg bid for now given decr po intake  - monitor i/o's, daily weights  - hold entresto 2/2 n/v/d  - c/w home metoprolol 100 mg bid for now  - holding eplerenone ef 29% previously, s/p AICD  BNP >13K, edema may be cellulitic in nature thus if MANJIT does not improve may need to decrease Bumex further  on bumex 4 mg po bid - continue 2 mg bid for now given decr po intake  - monitor i/o's, daily weights  - hold entresto 2/2 n/v/d  - c/w home metoprolol 100 mg bid for now  - holding eplerenone

## 2023-05-29 NOTE — PROVIDER CONTACT NOTE (CRITICAL VALUE NOTIFICATION) - ASSESSMENT
gram positive cocci in pairs and chains in anaerobic bottle
gram positive cocci in pairs and chains in aerobic bottle

## 2023-05-29 NOTE — PROGRESS NOTE ADULT - PROBLEM SELECTOR PLAN 7
dvt ppx:hsq  no PT needs  Dispo: pending clinical improvement dvt ppx:hsq  no PT needs  Dispo: pending clinical improvement      d/w pt in detail at the bedside

## 2023-05-29 NOTE — PROGRESS NOTE ADULT - SUBJECTIVE AND OBJECTIVE BOX
[facility] Division of Hospital Medicine  Carolyn Gabriel MD  Pager (M-F, 8A-5P): [phone], MS Teams PREFERRED  Other Times:  [phone]      SUBJECTIVE / OVERNIGHT EVENTS:  ADDITIONAL REVIEW OF SYSTEMS:    MEDICATIONS  (STANDING):  ammonium lactate 12% Lotion 1 Application(s) Topical two times a day  aspirin enteric coated 81 milliGRAM(s) Oral daily  atorvastatin 80 milliGRAM(s) Oral at bedtime  budesonide 160 MICROgram(s)/formoterol 4.5 MICROgram(s) Inhaler 2 Puff(s) Inhalation two times a day  buMETAnide 2 milliGRAM(s) Oral two times a day  ceFAZolin   IVPB      ceFAZolin   IVPB 1000 milliGRAM(s) IV Intermittent every 8 hours  clopidogrel Tablet 75 milliGRAM(s) Oral daily  dextrose 5%. 1000 milliLiter(s) (50 mL/Hr) IV Continuous <Continuous>  dextrose 50% Injectable 25 Gram(s) IV Push once  heparin   Injectable 5000 Unit(s) SubCutaneous every 8 hours  insulin glargine Injectable (LANTUS) 25 Unit(s) SubCutaneous at bedtime  insulin lispro (ADMELOG) corrective regimen sliding scale   SubCutaneous three times a day before meals  metoprolol succinate  milliGRAM(s) Oral every 12 hours  pantoprazole    Tablet 40 milliGRAM(s) Oral before breakfast  tamsulosin 0.4 milliGRAM(s) Oral at bedtime    MEDICATIONS  (PRN):  acetaminophen     Tablet .. 650 milliGRAM(s) Oral every 6 hours PRN Temp greater or equal to 38C (100.4F), Mild Pain (1 - 3)  albuterol    90 MICROgram(s) HFA Inhaler 2 Puff(s) Inhalation every 6 hours PRN Shortness of Breath and/or Wheezing  ALPRAZolam 0.25 milliGRAM(s) Oral daily PRN anxiety  aluminum hydroxide/magnesium hydroxide/simethicone Suspension 30 milliLiter(s) Oral every 4 hours PRN Dyspepsia  dextrose Oral Gel 15 Gram(s) Oral once PRN Blood Glucose LESS THAN 70 milliGRAM(s)/deciliter  melatonin 3 milliGRAM(s) Oral at bedtime PRN Insomnia  ondansetron Injectable 4 milliGRAM(s) IV Push every 8 hours PRN Nausea and/or Vomiting      I&O's Summary    28 May 2023 07:01  -  29 May 2023 07:00  --------------------------------------------------------  IN: 550 mL / OUT: 200 mL / NET: 350 mL        PHYSICAL EXAM:  Vital Signs Last 24 Hrs  T(C): 36.8 (29 May 2023 11:30), Max: 38.6 (28 May 2023 16:40)  T(F): 98.2 (29 May 2023 11:30), Max: 101.5 (28 May 2023 16:40)  HR: 97 (29 May 2023 11:30) (97 - 112)  BP: 92/60 (29 May 2023 11:30) (92/60 - 119/82)  BP(mean): 65 (28 May 2023 16:40) (65 - 65)  RR: 18 (29 May 2023 11:30) (18 - 20)  SpO2: 92% (29 May 2023 11:30) (92% - 96%)    Parameters below as of 29 May 2023 11:30  Patient On (Oxygen Delivery Method): room air      CONSTITUTIONAL: NAD, well-developed, well-groomed  EYES: PERRLA; conjunctiva and sclera clear  ENMT: Moist oral mucosa, no pharyngeal injection or exudates; normal dentition  NECK: Supple, no palpable masses; no thyromegaly  RESPIRATORY: Normal respiratory effort; lungs are clear to auscultation bilaterally  CARDIOVASCULAR: Regular rate and rhythm, normal S1 and S2, no murmur/rub/gallop; No lower extremity edema; Peripheral pulses are 2+ bilaterally  ABDOMEN: Nontender to palpation, normoactive bowel sounds, no rebound/guarding; No hepatosplenomegaly  MUSCULOSKELETAL:  Normal gait; no clubbing or cyanosis of digits; no joint swelling or tenderness to palpation  PSYCH: A+O to person, place, and time; affect appropriate  NEUROLOGY: CN 2-12 are intact and symmetric; no gross sensory deficits   SKIN: No rashes; no palpable lesions    LABS:                        11.8   14.19 )-----------( 151      ( 29 May 2023 07:38 )             39.3         135  |  99  |  57<H>  ----------------------------<  225<H>  3.5   |  18<L>  |  2.55<H>    Ca    8.5      29 May 2023 07:38  Mg     2.0         TPro  6.9  /  Alb  3.3  /  TBili  1.0  /  DBili  x   /  AST  25  /  ALT  21  /  AlkPhos  161<H>            Urinalysis Basic - ( 28 May 2023 09:31 )    Color: Yellow / Appearance: Clear / S.016 / pH: x  Gluc: x / Ketone: Negative  / Bili: Negative / Urobili: Negative   Blood: x / Protein: 30 mg/dL / Nitrite: Negative   Leuk Esterase: Negative / RBC: 2 /hpf / WBC 1 /HPF   Sq Epi: x / Non Sq Epi: x / Bacteria: Negative        Culture - Urine (collected 28 May 2023 09:31)  Source: Clean Catch Clean Catch (Midstream)  Final Report (29 May 2023 08:57):    <10,000 CFU/mL Normal Urogenital Dorothy    Culture - Blood (collected 28 May 2023 07:35)  Source: .Blood Blood-Peripheral  Gram Stain (29 May 2023 01:47):    Growth in aerobic bottle: Gram Positive Cocci in Pairs and Chains  Preliminary Report (29 May 2023 01:47):    Growth in aerobic bottle: Gram Positive Cocci in Pairs and Chains    Culture - Blood (collected 28 May 2023 07:20)  Source: .Blood Blood-Peripheral  Gram Stain (28 May 2023 22:16):    Growth in anaerobic bottle: Gram Positive Cocci in Pairs and Chains  Preliminary Report (29 May 2023 12:34):    Growth in anaerobic bottle: Streptococcus dysgalactiae (Group C/G)    Susceptibility to follow.    ***Blood Panel PCR results on this specimen are available    approximately 3 hours after the Gram stain result.***    Gram stain, PCR, and/or culture resultsmay not always    correspond due to difference in methodologies.    ************************************************************    This PCR assay was performed by multiplex PCR. This    Assay tests for 66 bacterial and resistance gene targets.    Please referto the [facility] test directory    at [email or web address] for details.  Organism: Blood Culture PCR (29 May 2023 00:47)  Organism: Blood Culture PCR (29 May 2023 00:47)        RADIOLOGY & ADDITIONAL TESTS:  Results Reviewed:   Imaging Personally Reviewed:  Electrocardiogram Personally Reviewed:    COORDINATION OF CARE:  Care Discussed with Consultants/Other Providers [Y/N]:  Prior or Outpatient Records Reviewed [Y/N]:   [facility] Division of Hospital Medicine  Carolyn Gabriel MD  Pager (M-F, 8A-5P): [phone], MS Teams PREFERRED  Other Times:  [phone]      SUBJECTIVE / OVERNIGHT EVENTS: Seen and examined at bedside. NAD. He reports 1 episode of vomiting this morning but no diarrhea.     MEDICATIONS  (STANDING):  ammonium lactate 12% Lotion 1 Application(s) Topical two times a day  aspirin enteric coated 81 milliGRAM(s) Oral daily  atorvastatin 80 milliGRAM(s) Oral at bedtime  budesonide 160 MICROgram(s)/formoterol 4.5 MICROgram(s) Inhaler 2 Puff(s) Inhalation two times a day  buMETAnide 2 milliGRAM(s) Oral two times a day  ceFAZolin   IVPB      ceFAZolin   IVPB 1000 milliGRAM(s) IV Intermittent every 8 hours  clopidogrel Tablet 75 milliGRAM(s) Oral daily  dextrose 5%. 1000 milliLiter(s) (50 mL/Hr) IV Continuous <Continuous>  dextrose 50% Injectable 25 Gram(s) IV Push once  heparin   Injectable 5000 Unit(s) SubCutaneous every 8 hours  insulin glargine Injectable (LANTUS) 25 Unit(s) SubCutaneous at bedtime  insulin lispro (ADMELOG) corrective regimen sliding scale   SubCutaneous three times a day before meals  metoprolol succinate  milliGRAM(s) Oral every 12 hours  pantoprazole    Tablet 40 milliGRAM(s) Oral before breakfast  tamsulosin 0.4 milliGRAM(s) Oral at bedtime    MEDICATIONS  (PRN):  acetaminophen     Tablet .. 650 milliGRAM(s) Oral every 6 hours PRN Temp greater or equal to 38C (100.4F), Mild Pain (1 - 3)  albuterol    90 MICROgram(s) HFA Inhaler 2 Puff(s) Inhalation every 6 hours PRN Shortness of Breath and/or Wheezing  ALPRAZolam 0.25 milliGRAM(s) Oral daily PRN anxiety  aluminum hydroxide/magnesium hydroxide/simethicone Suspension 30 milliLiter(s) Oral every 4 hours PRN Dyspepsia  dextrose Oral Gel 15 Gram(s) Oral once PRN Blood Glucose LESS THAN 70 milliGRAM(s)/deciliter  melatonin 3 milliGRAM(s) Oral at bedtime PRN Insomnia  ondansetron Injectable 4 milliGRAM(s) IV Push every 8 hours PRN Nausea and/or Vomiting      I&O's Summary    28 May 2023 07:01  -  29 May 2023 07:00  --------------------------------------------------------  IN: 550 mL / OUT: 200 mL / NET: 350 mL        PHYSICAL EXAM:  Vital Signs Last 24 Hrs  T(C): 36.8 (29 May 2023 11:30), Max: 38.6 (28 May 2023 16:40)  T(F): 98.2 (29 May 2023 11:30), Max: 101.5 (28 May 2023 16:40)  HR: 97 (29 May 2023 11:30) (97 - 112)  BP: 92/60 (29 May 2023 11:30) (92/60 - 119/82)  BP(mean): 65 (28 May 2023 16:40) (65 - 65)  RR: 18 (29 May 2023 11:30) (18 - 20)  SpO2: 92% (29 May 2023 11:30) (92% - 96%)    Parameters below as of 29 May 2023 11:30  Patient On (Oxygen Delivery Method): room air      CONSTITUTIONAL: NAD, well-developed, well-groomed, obese male lying in bed  EYES: PERRLA; conjunctiva and sclera clear  ENMT: Moist oral mucosa, no pharyngeal injection or exudates; some white debris in his heard  NECK: Supple, no palpable masses; thick neck  RESPIRATORY: Normal respiratory effort; lungs are clear to auscultation bilaterally  CARDIOVASCULAR: Regular rate and rhythm, normal S1 and S2, no murmur/rub/gallop; No lower extremity edema;   ABDOMEN: Nontender to palpation, normoactive bowel sounds, no rebound/guarding; obese  MUSCULOSKELETAL:   no clubbing or cyanosis of digits; no joint swelling or tenderness to palpation  PSYCH: A+O to person, place, and time; affect appropriate  NEUROLOGY: CN 2-12 are intact and symmetric; no gross sensory deficits   SKIN: No rashes; no palpable lesions; diffuse erythema over the BLLE with streaking up the thigh, worse in the pre-tibial area with associated edema    LABS:                        11.8   14.19 )-----------( 151      ( 29 May 2023 07:38 )             39.3         135  |  99  |  57<H>  ----------------------------<  225<H>  3.5   |  18<L>  |  2.55<H>    Ca    8.5      29 May 2023 07:38  Mg     2.0         TPro  6.9  /  Alb  3.3  /  TBili  1.0  /  DBili  x   /  AST  25  /  ALT  21  /  AlkPhos  161<H>            Urinalysis Basic - ( 28 May 2023 09:31 )    Color: Yellow / Appearance: Clear / S.016 / pH: x  Gluc: x / Ketone: Negative  / Bili: Negative / Urobili: Negative   Blood: x / Protein: 30 mg/dL / Nitrite: Negative   Leuk Esterase: Negative / RBC: 2 /hpf / WBC 1 /HPF   Sq Epi: x / Non Sq Epi: x / Bacteria: Negative        Culture - Urine (collected 28 May 2023 09:31)  Source: Clean Catch Clean Catch (Midstream)  Final Report (29 May 2023 08:57):    <10,000 CFU/mL Normal Urogenital Dorothy    Culture - Blood (collected 28 May 2023 07:35)  Source: .Blood Blood-Peripheral  Gram Stain (29 May 2023 01:47):    Growth in aerobic bottle: Gram Positive Cocci in Pairs and Chains  Preliminary Report (29 May 2023 01:47):    Growth in aerobic bottle: Gram Positive Cocci in Pairs and Chains    Culture - Blood (collected 28 May 2023 07:20)  Source: .Blood Blood-Peripheral  Gram Stain (28 May 2023 22:16):    Growth in anaerobic bottle: Gram Positive Cocci in Pairs and Chains  Preliminary Report (29 May 2023 12:34):    Growth in anaerobic bottle: Streptococcus dysgalactiae (Group C/G)    Susceptibility to follow.    ***Blood Panel PCR results on this specimen are available    approximately 3 hours after the Gram stain result.***    Gram stain, PCR, and/or culture resultsmay not always    correspond due to difference in methodologies.    ************************************************************    This PCR assay was performed by multiplex PCR. This    Assay tests for 66 bacterial and resistance gene targets.    Please referto the [facility] test directory    at [email or web address] for details.  Organism: Blood Culture PCR (29 May 2023 00:47)  Organism: Blood Culture PCR (29 May 2023 00:47)

## 2023-05-29 NOTE — PROGRESS NOTE ADULT - ASSESSMENT
63 yo M PMHx of HFrEF EF 29%, ICM, ICD, IDDM2, COPD, LUIS ALFREDO, CKD3-4 (BL Cr 2.2) p/w 1 day of  N/V/D, 1 week of weakness adm with severe sepsis due to suspected cellulitis 63 yo M PMHx of HFrEF EF 29%, ICM s/p AICD, ICD, IDDM2, COPD, LUIS ALFREDO, CKD3-4 (BL Cr 2.2) p/w 1 day of  N/V/D, 1 week of weakness adm with severe sepsis due to suspected cellulitis

## 2023-05-30 DIAGNOSIS — R78.81 BACTEREMIA: ICD-10-CM

## 2023-05-30 LAB
-  CEFTRIAXONE: SIGNIFICANT CHANGE UP
-  CLINDAMYCIN: SIGNIFICANT CHANGE UP
-  LEVOFLOXACIN: SIGNIFICANT CHANGE UP
-  PENICILLIN: SIGNIFICANT CHANGE UP
-  VANCOMYCIN: SIGNIFICANT CHANGE UP
ANION GAP SERPL CALC-SCNC: 17 MMOL/L — SIGNIFICANT CHANGE UP (ref 5–17)
ANION GAP SERPL CALC-SCNC: 19 MMOL/L — HIGH (ref 5–17)
BUN SERPL-MCNC: 69 MG/DL — HIGH (ref 7–23)
BUN SERPL-MCNC: 70 MG/DL — HIGH (ref 7–23)
CALCIUM SERPL-MCNC: 8.7 MG/DL — SIGNIFICANT CHANGE UP (ref 8.4–10.5)
CALCIUM SERPL-MCNC: 8.8 MG/DL — SIGNIFICANT CHANGE UP (ref 8.4–10.5)
CHLORIDE SERPL-SCNC: 95 MMOL/L — LOW (ref 96–108)
CHLORIDE SERPL-SCNC: 96 MMOL/L — SIGNIFICANT CHANGE UP (ref 96–108)
CO2 SERPL-SCNC: 19 MMOL/L — LOW (ref 22–31)
CO2 SERPL-SCNC: 20 MMOL/L — LOW (ref 22–31)
CREAT SERPL-MCNC: 2.99 MG/DL — HIGH (ref 0.5–1.3)
CREAT SERPL-MCNC: 3 MG/DL — HIGH (ref 0.5–1.3)
CULTURE RESULTS: SIGNIFICANT CHANGE UP
CULTURE RESULTS: SIGNIFICANT CHANGE UP
EGFR: 23 ML/MIN/1.73M2 — LOW
EGFR: 23 ML/MIN/1.73M2 — LOW
GLUCOSE BLDC GLUCOMTR-MCNC: 159 MG/DL — HIGH (ref 70–99)
GLUCOSE BLDC GLUCOMTR-MCNC: 166 MG/DL — HIGH (ref 70–99)
GLUCOSE BLDC GLUCOMTR-MCNC: 174 MG/DL — HIGH (ref 70–99)
GLUCOSE BLDC GLUCOMTR-MCNC: 175 MG/DL — HIGH (ref 70–99)
GLUCOSE SERPL-MCNC: 162 MG/DL — HIGH (ref 70–99)
GLUCOSE SERPL-MCNC: 163 MG/DL — HIGH (ref 70–99)
HCT VFR BLD CALC: 37.5 % — LOW (ref 39–50)
HGB BLD-MCNC: 11.1 G/DL — LOW (ref 13–17)
MAGNESIUM SERPL-MCNC: 2.3 MG/DL — SIGNIFICANT CHANGE UP (ref 1.6–2.6)
MCHC RBC-ENTMCNC: 22.7 PG — LOW (ref 27–34)
MCHC RBC-ENTMCNC: 29.6 GM/DL — LOW (ref 32–36)
MCV RBC AUTO: 76.5 FL — LOW (ref 80–100)
METHOD TYPE: SIGNIFICANT CHANGE UP
NRBC # BLD: 0 /100 WBCS — SIGNIFICANT CHANGE UP (ref 0–0)
ORGANISM # SPEC MICROSCOPIC CNT: SIGNIFICANT CHANGE UP
PLATELET # BLD AUTO: 133 K/UL — LOW (ref 150–400)
POTASSIUM SERPL-MCNC: 3.5 MMOL/L — SIGNIFICANT CHANGE UP (ref 3.5–5.3)
POTASSIUM SERPL-MCNC: 3.5 MMOL/L — SIGNIFICANT CHANGE UP (ref 3.5–5.3)
POTASSIUM SERPL-SCNC: 3.5 MMOL/L — SIGNIFICANT CHANGE UP (ref 3.5–5.3)
POTASSIUM SERPL-SCNC: 3.5 MMOL/L — SIGNIFICANT CHANGE UP (ref 3.5–5.3)
RBC # BLD: 4.9 M/UL — SIGNIFICANT CHANGE UP (ref 4.2–5.8)
RBC # FLD: 19.1 % — HIGH (ref 10.3–14.5)
SODIUM SERPL-SCNC: 133 MMOL/L — LOW (ref 135–145)
SODIUM SERPL-SCNC: 133 MMOL/L — LOW (ref 135–145)
SPECIMEN SOURCE: SIGNIFICANT CHANGE UP
SPECIMEN SOURCE: SIGNIFICANT CHANGE UP
WBC # BLD: 11.32 K/UL — HIGH (ref 3.8–10.5)
WBC # FLD AUTO: 11.32 K/UL — HIGH (ref 3.8–10.5)

## 2023-05-30 PROCEDURE — 99223 1ST HOSP IP/OBS HIGH 75: CPT

## 2023-05-30 PROCEDURE — 93010 ELECTROCARDIOGRAM REPORT: CPT

## 2023-05-30 RX ORDER — CEFAZOLIN SODIUM 1 G
2000 VIAL (EA) INJECTION EVERY 12 HOURS
Refills: 0 | Status: DISCONTINUED | OUTPATIENT
Start: 2023-05-30 | End: 2023-05-30

## 2023-05-30 RX ORDER — CEFAZOLIN SODIUM 1 G
2000 VIAL (EA) INJECTION EVERY 12 HOURS
Refills: 0 | Status: DISCONTINUED | OUTPATIENT
Start: 2023-05-30 | End: 2023-06-01

## 2023-05-30 RX ADMIN — Medication 0.25 MILLIGRAM(S): at 23:09

## 2023-05-30 RX ADMIN — PANTOPRAZOLE SODIUM 40 MILLIGRAM(S): 20 TABLET, DELAYED RELEASE ORAL at 06:10

## 2023-05-30 RX ADMIN — INSULIN GLARGINE 25 UNIT(S): 100 INJECTION, SOLUTION SUBCUTANEOUS at 22:23

## 2023-05-30 RX ADMIN — HEPARIN SODIUM 5000 UNIT(S): 5000 INJECTION INTRAVENOUS; SUBCUTANEOUS at 22:23

## 2023-05-30 RX ADMIN — Medication 1 PATCH: at 19:06

## 2023-05-30 RX ADMIN — BUMETANIDE 2 MILLIGRAM(S): 0.25 INJECTION INTRAMUSCULAR; INTRAVENOUS at 06:10

## 2023-05-30 RX ADMIN — Medication 1: at 12:07

## 2023-05-30 RX ADMIN — BUDESONIDE AND FORMOTEROL FUMARATE DIHYDRATE 2 PUFF(S): 160; 4.5 AEROSOL RESPIRATORY (INHALATION) at 06:10

## 2023-05-30 RX ADMIN — ONDANSETRON 4 MILLIGRAM(S): 8 TABLET, FILM COATED ORAL at 07:36

## 2023-05-30 RX ADMIN — Medication 1: at 08:44

## 2023-05-30 RX ADMIN — HEPARIN SODIUM 5000 UNIT(S): 5000 INJECTION INTRAVENOUS; SUBCUTANEOUS at 06:10

## 2023-05-30 RX ADMIN — Medication 100 MILLIGRAM(S): at 20:09

## 2023-05-30 RX ADMIN — Medication 81 MILLIGRAM(S): at 11:29

## 2023-05-30 RX ADMIN — CLOPIDOGREL BISULFATE 75 MILLIGRAM(S): 75 TABLET, FILM COATED ORAL at 11:29

## 2023-05-30 RX ADMIN — Medication 1 PATCH: at 22:07

## 2023-05-30 RX ADMIN — Medication 1 APPLICATION(S): at 06:10

## 2023-05-30 RX ADMIN — Medication 100 MILLIGRAM(S): at 06:10

## 2023-05-30 RX ADMIN — TAMSULOSIN HYDROCHLORIDE 0.4 MILLIGRAM(S): 0.4 CAPSULE ORAL at 22:23

## 2023-05-30 RX ADMIN — Medication 100 MILLIGRAM(S): at 06:11

## 2023-05-30 RX ADMIN — ATORVASTATIN CALCIUM 80 MILLIGRAM(S): 80 TABLET, FILM COATED ORAL at 22:23

## 2023-05-30 RX ADMIN — Medication 1: at 17:48

## 2023-05-30 NOTE — DIETITIAN INITIAL EVALUATION ADULT - PERSON TAUGHT/METHOD
adequate caloric/protein intake w/ food sources reviewed, consistent carbohydrate intake for DM and low sodium diet guidelines/intake for h/o HF, all questions were answered/verbal instruction/teach back - (Patient repeats in own words)/patient instructed

## 2023-05-30 NOTE — PROGRESS NOTE ADULT - SUBJECTIVE AND OBJECTIVE BOX
[facility]  Lul Cruz  Office: [phone]  Cell: [phone]  Can also be reached on Microsoft Teams     Patient being seen in the outpatient setting by me. Will resume care today. Please call with any questions or concerns.  Worsening renal function in the setting of strept bacteremia and sepsis. Holding Bumex for now, Nephro consulted. Will be seen today.    ***INCOMPLETE*** [facility]  Lul Cruz  Office: [phone]  Cell: [phone]  Can also be reached on Microsoft Teams       INTERVAL HPI/OVERNIGHT EVENTS: Resuming care today. Feeling well when examined in the afternoon, no mental status changes. Patient awake alert and oriented x 4. No new complaints, endorsing that his leg swelling and redness has improved significantly, no sob nor chest pain. No more fever chills, no cough. Endorsing continuation of diarrhea, < 3 episodes a day. No nausea or vomiting      REVIEW OF SYSTEMS:  Negative except per hpi    Vital Signs Last 24 Hrs  T(C): 36.7 (30 May 2023 11:50), Max: 37.3 (29 May 2023 19:18)  T(F): 98.1 (30 May 2023 11:50), Max: 99.1 (29 May 2023 19:18)  HR: 96 (30 May 2023 11:50) (95 - 103)  BP: 90/61 (30 May 2023 11:50) (86/62 - 103/63)  BP(mean): --  RR: 18 (30 May 2023 11:50) (18 - 18)  SpO2: 94% (30 May 2023 11:50) (93% - 95%)    Parameters below as of 30 May 2023 11:50  Patient On (Oxygen Delivery Method): room air        PHYSICAL EXAMINATION:  GENERAL: NAD, well built  HEAD:  Atraumatic, Normocephalic  EYES:  conjunctiva and sclera clear  NECK: Supple, No JVD, Normal thyroid  CHEST/LUNG: clear bilaterally, diminished, no obvious wheezing or crackles  HEART: Regular rate and rhythm  ABDOMEN: Soft, Nontender, enlarged, but softer than previous. Bowel sounds present  NERVOUS SYSTEM:  Alert & Oriented X4 to person place time and situation, no focal neurological deficits   EXTREMITIES:  2+ Peripheral Pulses, Mild pitting edema, redness on bilateral legs, worse on the right extending just proximal to knees. Open wound plantar surface of right foot, healing  SKIN: warm dry                          11.1   11.32 )-----------( 133      ( 30 May 2023 07:04 )             37.5     05-30    133<L>  |  95<L>  |  69<H>  ----------------------------<  162<H>  3.5   |  19<L>  |  2.99<H>    Ca    8.8      30 May 2023 07:01  Mg     2.3     05-30    TPro  6.9  /  Alb  3.3  /  TBili  1.0  /  DBili  x   /  AST  25  /  ALT  21  /  AlkPhos  161<H>  05-29    LIVER FUNCTIONS - ( 29 May 2023 07:38 )  Alb: 3.3 g/dL / Pro: 6.9 g/dL / ALK PHOS: 161 U/L / ALT: 21 U/L / AST: 25 U/L / GGT: x                   CAPILLARY BLOOD GLUCOSE      RADIOLOGY & ADDITIONAL TESTS:

## 2023-05-30 NOTE — DIETITIAN INITIAL EVALUATION ADULT - NS FNS DIET ORDER
Diet, Regular:   Consistent Carbohydrate {No Snacks} (CSTCHO)  Low Sodium (05-28-23 @ 14:54) [Active]

## 2023-05-30 NOTE — PROGRESS NOTE ADULT - ASSESSMENT
61 yo M PMHx of HFrEF EF 29%, ICM s/p AICD, ICD, IDDM2, COPD, LUIS ALFREDO, CKD3-4 (BL Cr 2.2) p/w sepsis due to cellulitis

## 2023-05-30 NOTE — DIETITIAN INITIAL EVALUATION ADULT - REASON INDICATOR FOR ASSESSMENT
Consult for "decompensated heart failure, salt and fluid restriction"  Source: chart, patient  Chart reviewed, events noted

## 2023-05-30 NOTE — DIETITIAN INITIAL EVALUATION ADULT - ENERGY INTAKE
Patient reports his PO intake/appetite was poor upon admission in setting of NBNB emesis, nausea, both of which have now subsided and patient feeling better. States he was able to have some peaches this AM and took them well, and was up to having a sandwich for lunch later today. Patient w/ c/o multiple BMs, currently on antibiotic treatment which is likely influencing multiple BMs. Patient amenable to have daily yogurt for probiotic benefit. Fair (50-75%)

## 2023-05-30 NOTE — DIETITIAN INITIAL EVALUATION ADULT - REASON
Patient eating well PTA w/ no significant weight changes reported, had acute <PO for ~1-2 days upon admission now resolving w resolution of nausea/emesis, will continue to monitor parameters

## 2023-05-30 NOTE — CONSULT NOTE ADULT - NS ATTEND AMEND GEN_ALL_CORE FT
Seen, examined with, formulated plan with and  agree with above as scribed by NP Quin [Stevan]     ill appearing M when seen   + JVD  + edema 2+   heart RRR  lungs decrease bs         1- MANJIT on CKD   2- chronic hydro   3- chf   4- bacteremia     manjit in setting of infection and diuretics   echo  hold diuretics today will likely start in 24-48 hr   acef 2 g iv bid  hydro is chronic in nature

## 2023-05-30 NOTE — PROVIDER CONTACT NOTE (OTHER) - SITUATION
pt is complaining of a dull chest pain. BP is 92/61, HR is 99 and O2 saturation is 93% on room air. last EKG was completed on 5/28

## 2023-05-30 NOTE — DIETITIAN INITIAL EVALUATION ADULT - ORAL INTAKE PTA/DIET HISTORY
Patient reports prior to onset of acute symptoms, he normally eats well at home w/ no prior issues. Denied chewing/swallowing impairment, no further nausea/vomiting currently (only had upon admission per patient). States he normally has 3 meals a day at home, dislikes all vegetables, has never been on a fluid restriction historically. Reports longstanding DM and follows w/ his PCP for management, states his HgbA1C has been between 7-8 upon recent visits, has occasional hypoglycemic episodes.

## 2023-05-30 NOTE — DIETITIAN INITIAL EVALUATION ADULT - REASON FOR ADMISSION
Sepsis    Per chart, patient is a 61 y/o male with PMH including HFrEF, ICM, ICD, IDDM2, COPD, LUIS ALFREDO, CKD3-4. Patient presents to [facility] w/ 1 day of N/V/D, feeling unwell for the past week. Patient reporting recently seeing his wound specialist for chronic R foot wound and had the wound debrided, since then, he has not felt well and w/ chills at home. Upon admission patient c/o multiple episodes of nausea, NBNB emesis, nonbloody diarrhea per MD. Patient w/ severe sepsis due to suspected cellulitis per MD, current receiving antibiotic regimen.

## 2023-05-30 NOTE — PROGRESS NOTE ADULT - PROBLEM SELECTOR PLAN 7
on lantus 50 u bedtime and lispro as well  - given poor po intake, halve lantus to 25 u bedtime  - monitor fs tidac and c/w ISS  - judicious changes in insulin due to unstable renal function

## 2023-05-30 NOTE — PROGRESS NOTE ADULT - PROBLEM SELECTOR PLAN 5
Worsening renal function  - suspect this is due to pre-renal disease- vomiting at home with diarrhea, only 500cc fluids given and diuretics were resumed  - Stop bumex now, Nephrology, Dr. Cain was consulted  - CHeck UA now with urinelytes  - Continue trending   - Avoid further IVF for now- eating and drinking well

## 2023-05-30 NOTE — DIETITIAN INITIAL EVALUATION ADULT - ADD RECOMMEND
1. continue current diet as ordered of: low sodium, consistent carbohydrate diet  2. encourage PO intake, protein source with each meal as tolerated  3. encourage daily yogurt for probiotic benefit i/s/o antibiotic regimen  4. provide antiemetics PRN for nausea (p/w nausea, NBNB emesis upon admission per chart, improved now per patient)  5. if medically feasible, recommend addition of nephrovite and vitamin C to help aid in wound healing  6. monitor PO intake, weight trend, electrolytes, blood glucose levels, labs, BMs

## 2023-05-30 NOTE — DIETITIAN INITIAL EVALUATION ADULT - OTHER CALCULATIONS
Defer fluid needs to team. Calculations based on IBW + 10% and accounting for factor of chronic R foot wound

## 2023-05-30 NOTE — DIETITIAN INITIAL EVALUATION ADULT - PERTINENT MEDS FT
MEDICATIONS  (STANDING):  ammonium lactate 12% Lotion 1 Application(s) Topical two times a day  aspirin enteric coated 81 milliGRAM(s) Oral daily  atorvastatin 80 milliGRAM(s) Oral at bedtime  budesonide 160 MICROgram(s)/formoterol 4.5 MICROgram(s) Inhaler 2 Puff(s) Inhalation two times a day  ceFAZolin   IVPB 2000 milliGRAM(s) IV Intermittent every 12 hours  clopidogrel Tablet 75 milliGRAM(s) Oral daily  dextrose 5%. 1000 milliLiter(s) (50 mL/Hr) IV Continuous <Continuous>  dextrose 50% Injectable 25 Gram(s) IV Push once  heparin   Injectable 5000 Unit(s) SubCutaneous every 8 hours  insulin glargine Injectable (LANTUS) 25 Unit(s) SubCutaneous at bedtime  insulin lispro (ADMELOG) corrective regimen sliding scale   SubCutaneous three times a day before meals  metoprolol succinate  milliGRAM(s) Oral every 12 hours  pantoprazole    Tablet 40 milliGRAM(s) Oral before breakfast  tamsulosin 0.4 milliGRAM(s) Oral at bedtime    MEDICATIONS  (PRN):  acetaminophen     Tablet .. 650 milliGRAM(s) Oral every 6 hours PRN Temp greater or equal to 38C (100.4F), Mild Pain (1 - 3)  albuterol    90 MICROgram(s) HFA Inhaler 2 Puff(s) Inhalation every 6 hours PRN Shortness of Breath and/or Wheezing  ALPRAZolam 0.25 milliGRAM(s) Oral daily PRN anxiety  aluminum hydroxide/magnesium hydroxide/simethicone Suspension 30 milliLiter(s) Oral every 4 hours PRN Dyspepsia  dextrose Oral Gel 15 Gram(s) Oral once PRN Blood Glucose LESS THAN 70 milliGRAM(s)/deciliter  melatonin 3 milliGRAM(s) Oral at bedtime PRN Insomnia  ondansetron Injectable 4 milliGRAM(s) IV Push every 8 hours PRN Nausea and/or Vomiting

## 2023-05-30 NOTE — DIETITIAN INITIAL EVALUATION ADULT - OTHER INFO
NKFA per patient. Patient states he has not had any significant weight gain/loss PTA, states weight can sometimes fluctuate w/ fluid status, does not specify his UBW. States he continues to take bumex at home for which it was recently increased PTA. Will continue to monitor weight trend.    HgbA1C 8.5% 5/29; patient w/ h/o DM. Admelog, lantus in place for insulin regimen in-house. Will continue to monitor blood glucose trend. Hyponatremia of 133 noted today. Management per team. BUN/Cr noted; patient w/ h/o CKD3-4 per chart.

## 2023-05-30 NOTE — CHART NOTE - NSCHARTNOTEFT_GEN_A_CORE
MEDICINE NP-EPISODIC NOTE    Notified by RN patient with dull chest pain. Seen and examined patient at bedside. Patient is AOx4, NAD, c/o chest pain earlier, does not radiate now with +belching and states feeling better. Denies HA,  SOB, cough, N/V, or abd pain.    VITAL SIGNS:  Vital Signs Last 24 Hrs  T(C): 37.2 (30 May 2023 19:36), Max: 37.3 (30 May 2023 04:33)  T(F): 99 (30 May 2023 19:36), Max: 99.1 (30 May 2023 04:33)  HR: 99 (30 May 2023 20:50) (95 - 100)  BP: 92/61 (30 May 2023 20:50) (86/62 - 96/65)  BP(mean): --  RR: 18 (30 May 2023 20:50) (18 - 18)  SpO2: 93% (30 May 2023 20:50) (90% - 95%)      LABORATORY:                          11.1   11.32 )-----------( 133      ( 30 May 2023 07:04 )             37.5       05-30    133<L>  |  95<L>  |  69<H>  ----------------------------<  162<H>  3.5   |  19<L>  |  2.99<H>    Ca    8.8      30 May 2023 07:01  Mg     2.3     05-30    TPro  6.9  /  Alb  3.3  /  TBili  1.0  /  DBili  x   /  AST  25  /  ALT  21  /  AlkPhos  161<H>  05-29          MICROBIOLOGY:           RADIOLOGY:    ACC: [id number] EXAM:  XR CHEST AP OR PA 1V   ORDERED BY: AMANDA CLARK     PROCEDURE DATE:  05/28/2023          INTERPRETATION:  HISTORY: Fever, nausea, vomiting, diarrhea    TECHNIQUE: A single AP view of the chest was obtained.    COMPARISON: 3/1/2023    FINDINGS: The heart size cannot be adequately assessed on this single   view. There is a single-lead subcutaneous AICD in place. There is a small   right pleural effusion and/or right basilar atelectasis. No focal   consolidation is seen. The hilar and mediastinal structures appear   unremarkable.    IMPRESSION: Small right pleural effusion and/or right basilar   atelectasis. Please correlate with the CT scan of the chest performed the   same day as this exam.          PHYSICAL EXAM:    Constitutional: AOx3. NAD.    Respiratory: clear lungs bilaterally. No wheezing, rhonchi, or crackles.    Cardiovascular: S1 S2. No murmurs.    Gastrointestinal: BS X4 active. soft. nontender.    Extremities/Vascular:  BLE nonpitting edema.      ASSESSMENT/PLAN:   61 yo M PMHx of HFrEF EF 29%, ICM, ICD, IDDM2, COPD, LUIS ALFREDO, CKD3-4 p/w 1 day of  N/V/D. Hx also obtained from wife at bedside. Pt reports feeling unwell for the past week, recently saw his wound specialist for a chronic r foot wound and had the wound debrided. Since then, he has not felt well and with chills at home. On day of presentation to the ED, pt c/o multiple episodes of nausea, nbnb emesis, and nonbloody diarrhea. Denies any sick contacts at home or anyone with similar symptoms including children. Wife feels this presentation is identical to prior admissions for sepsis in the setting of leg cellulitis after wound debridement. Pt denies any recent trauma to foot, any cp, sob.    In the ED VS reviewed stable - temp up to 101.5F, , Desat to 89% on RA  Meds received - ancef 2g, NS 500cc, tylenol 650 mg, 1 g IV, bumex 4 mg, pepcid 20 mg, kcl 40 meq (28 May 2023 17:20)      Now c/o chest pain now improving with belching, likely reflux.    --EKG w/o acute changes  --if recurrent CP cardiac enzymes  --c/w maalox prn  --f/u primary team in       [id number] MEDICINE NP-EPISODIC NOTE    Notified by RN patient with dull chest pain. Seen and examined patient at bedside. Patient is AOx4, NAD, c/o chest pain earlier, does not radiate now with +belching and states feeling better. Denies HA,  SOB, cough, N/V, or abd pain.    VITAL SIGNS:  Vital Signs Last 24 Hrs  T(C): 37.2 (30 May 2023 19:36), Max: 37.3 (30 May 2023 04:33)  T(F): 99 (30 May 2023 19:36), Max: 99.1 (30 May 2023 04:33)  HR: 99 (30 May 2023 20:50) (95 - 100)  BP: 92/61 (30 May 2023 20:50) (86/62 - 96/65)  BP(mean): --  RR: 18 (30 May 2023 20:50) (18 - 18)  SpO2: 93% (30 May 2023 20:50) (90% - 95%)      LABORATORY:                          11.1   11.32 )-----------( 133      ( 30 May 2023 07:04 )             37.5       05-30    133<L>  |  95<L>  |  69<H>  ----------------------------<  162<H>  3.5   |  19<L>  |  2.99<H>    Ca    8.8      30 May 2023 07:01  Mg     2.3     05-30    TPro  6.9  /  Alb  3.3  /  TBili  1.0  /  DBili  x   /  AST  25  /  ALT  21  /  AlkPhos  161<H>  05-29          MICROBIOLOGY:           RADIOLOGY:    ACC: [id number] EXAM:  XR CHEST AP OR PA 1V   ORDERED BY: AMANDA CLARK     PROCEDURE DATE:  05/28/2023          INTERPRETATION:  HISTORY: Fever, nausea, vomiting, diarrhea    TECHNIQUE: A single AP view of the chest was obtained.    COMPARISON: 3/1/2023    FINDINGS: The heart size cannot be adequately assessed on this single   view. There is a single-lead subcutaneous AICD in place. There is a small   right pleural effusion and/or right basilar atelectasis. No focal   consolidation is seen. The hilar and mediastinal structures appear   unremarkable.    IMPRESSION: Small right pleural effusion and/or right basilar   atelectasis. Please correlate with the CT scan of the chest performed the   same day as this exam.          PHYSICAL EXAM:    Constitutional: AOx3. NAD.    Respiratory: clear lungs bilaterally. No wheezing, rhonchi, or crackles.    Cardiovascular: S1 S2. No murmurs.    Gastrointestinal: BS X4 active. soft. nontender.    Extremities/Vascular:  BLE nonpitting edema.      ASSESSMENT/PLAN:   63 yo M PMHx of HFrEF EF 29%, ICM, ICD, IDDM2, COPD, LUIS ALFREDO, CKD3-4 p/w 1 day of  N/V/D. Hx also obtained from wife at bedside. Pt reports feeling unwell for the past week, recently saw his wound specialist for a chronic r foot wound and had the wound debrided. Since then, he has not felt well and with chills at home. On day of presentation to the ED, pt c/o multiple episodes of nausea, nbnb emesis, and nonbloody diarrhea. Denies any sick contacts at home or anyone with similar symptoms including children. Wife feels this presentation is identical to prior admissions for sepsis in the setting of leg cellulitis after wound debridement. Pt denies any recent trauma to foot, any cp, sob.    In the ED VS reviewed stable - temp up to 101.5F, , Desat to 89% on RA  Meds received - ancef 2g, NS 500cc, tylenol 650 mg, 1 g IV, bumex 4 mg, pepcid 20 mg, kcl 40 meq (28 May 2023 17:20)      Now c/o chest pain now improving with belching, likely reflux.    --EKG w/o acute changes  --if recurrent CP cardiac enzymes  --c/w maalox prn, protonix daily  --f/u primary team in       [id number]

## 2023-05-30 NOTE — DIETITIAN INITIAL EVALUATION ADULT - PERTINENT LABORATORY DATA
05-30    133<L>  |  95<L>  |  69<H>  ----------------------------<  162<H>  3.5   |  19<L>  |  2.99<H>    Ca    8.8      30 May 2023 07:01  Mg     2.3     05-30    TPro  6.9  /  Alb  3.3  /  TBili  1.0  /  DBili  x   /  AST  25  /  ALT  21  /  AlkPhos  161<H>  05-29  POCT Blood Glucose.: 175 mg/dL (05-30-23 @ 12:00)  A1C with Estimated Average Glucose Result: 8.5 % (05-29-23 @ 07:37)  A1C with Estimated Average Glucose Result: 7.4 % (03-02-23 @ 01:04)  A1C with Estimated Average Glucose Result: 8.2 % (12-14-22 @ 06:30)

## 2023-05-31 ENCOUNTER — APPOINTMENT (OUTPATIENT)
Dept: CARDIOLOGY | Facility: CLINIC | Age: 63
End: 2023-05-31

## 2023-05-31 LAB
ANION GAP SERPL CALC-SCNC: 18 MMOL/L — HIGH (ref 5–17)
ANION GAP SERPL CALC-SCNC: 18 MMOL/L — HIGH (ref 5–17)
APPEARANCE UR: ABNORMAL
BACTERIA # UR AUTO: NEGATIVE — SIGNIFICANT CHANGE UP
BILIRUB UR-MCNC: NEGATIVE — SIGNIFICANT CHANGE UP
BUN SERPL-MCNC: 86 MG/DL — HIGH (ref 7–23)
BUN SERPL-MCNC: 87 MG/DL — HIGH (ref 7–23)
CALCIUM SERPL-MCNC: 8.5 MG/DL — SIGNIFICANT CHANGE UP (ref 8.4–10.5)
CALCIUM SERPL-MCNC: 8.8 MG/DL — SIGNIFICANT CHANGE UP (ref 8.4–10.5)
CHLORIDE SERPL-SCNC: 94 MMOL/L — LOW (ref 96–108)
CHLORIDE SERPL-SCNC: 95 MMOL/L — LOW (ref 96–108)
CO2 SERPL-SCNC: 19 MMOL/L — LOW (ref 22–31)
CO2 SERPL-SCNC: 19 MMOL/L — LOW (ref 22–31)
COLOR SPEC: YELLOW — SIGNIFICANT CHANGE UP
COMMENT - URINE: SIGNIFICANT CHANGE UP
CREAT ?TM UR-MCNC: 130 MG/DL — SIGNIFICANT CHANGE UP
CREAT SERPL-MCNC: 3 MG/DL — HIGH (ref 0.5–1.3)
CREAT SERPL-MCNC: 3.01 MG/DL — HIGH (ref 0.5–1.3)
DIFF PNL FLD: ABNORMAL
EGFR: 23 ML/MIN/1.73M2 — LOW
EGFR: 23 ML/MIN/1.73M2 — LOW
EPI CELLS # UR: 2 — SIGNIFICANT CHANGE UP
GLUCOSE BLDC GLUCOMTR-MCNC: 156 MG/DL — HIGH (ref 70–99)
GLUCOSE BLDC GLUCOMTR-MCNC: 191 MG/DL — HIGH (ref 70–99)
GLUCOSE BLDC GLUCOMTR-MCNC: 201 MG/DL — HIGH (ref 70–99)
GLUCOSE BLDC GLUCOMTR-MCNC: 204 MG/DL — HIGH (ref 70–99)
GLUCOSE SERPL-MCNC: 149 MG/DL — HIGH (ref 70–99)
GLUCOSE SERPL-MCNC: 150 MG/DL — HIGH (ref 70–99)
GLUCOSE UR QL: NEGATIVE — SIGNIFICANT CHANGE UP
GRAN CASTS # UR COMP ASSIST: 4 /LPF — SIGNIFICANT CHANGE UP
HYALINE CASTS # UR AUTO: 14 /LPF — HIGH (ref 0–7)
KETONES UR-MCNC: NEGATIVE — SIGNIFICANT CHANGE UP
LEUKOCYTE ESTERASE UR-ACNC: NEGATIVE — SIGNIFICANT CHANGE UP
MAGNESIUM SERPL-MCNC: 2.6 MG/DL — SIGNIFICANT CHANGE UP (ref 1.6–2.6)
NITRITE UR-MCNC: NEGATIVE — SIGNIFICANT CHANGE UP
PH UR: 6 — SIGNIFICANT CHANGE UP (ref 5–8)
POTASSIUM SERPL-MCNC: 3.6 MMOL/L — SIGNIFICANT CHANGE UP (ref 3.5–5.3)
POTASSIUM SERPL-MCNC: 3.6 MMOL/L — SIGNIFICANT CHANGE UP (ref 3.5–5.3)
POTASSIUM SERPL-SCNC: 3.6 MMOL/L — SIGNIFICANT CHANGE UP (ref 3.5–5.3)
POTASSIUM SERPL-SCNC: 3.6 MMOL/L — SIGNIFICANT CHANGE UP (ref 3.5–5.3)
PROT ?TM UR-MCNC: 74 MG/DL — HIGH (ref 0–12)
PROT UR-MCNC: ABNORMAL
PROT/CREAT UR-RTO: 0.6 RATIO — HIGH (ref 0–0.2)
RBC CASTS # UR COMP ASSIST: 2 /HPF — SIGNIFICANT CHANGE UP (ref 0–4)
SODIUM SERPL-SCNC: 131 MMOL/L — LOW (ref 135–145)
SODIUM SERPL-SCNC: 132 MMOL/L — LOW (ref 135–145)
SP GR SPEC: 1.02 — SIGNIFICANT CHANGE UP (ref 1.01–1.02)
UROBILINOGEN FLD QL: NEGATIVE — SIGNIFICANT CHANGE UP
WBC UR QL: 2 /HPF — SIGNIFICANT CHANGE UP (ref 0–5)

## 2023-05-31 PROCEDURE — 99232 SBSQ HOSP IP/OBS MODERATE 35: CPT

## 2023-05-31 RX ORDER — SOD,AMMONIUM,POTASSIUM LACTATE
1 CREAM (GRAM) TOPICAL ONCE
Refills: 0 | Status: COMPLETED | OUTPATIENT
Start: 2023-05-31 | End: 2023-05-31

## 2023-05-31 RX ADMIN — HEPARIN SODIUM 5000 UNIT(S): 5000 INJECTION INTRAVENOUS; SUBCUTANEOUS at 21:50

## 2023-05-31 RX ADMIN — Medication 1: at 08:32

## 2023-05-31 RX ADMIN — Medication 1 APPLICATION(S): at 05:10

## 2023-05-31 RX ADMIN — PANTOPRAZOLE SODIUM 40 MILLIGRAM(S): 20 TABLET, DELAYED RELEASE ORAL at 05:10

## 2023-05-31 RX ADMIN — Medication 650 MILLIGRAM(S): at 03:42

## 2023-05-31 RX ADMIN — HEPARIN SODIUM 5000 UNIT(S): 5000 INJECTION INTRAVENOUS; SUBCUTANEOUS at 13:10

## 2023-05-31 RX ADMIN — TAMSULOSIN HYDROCHLORIDE 0.4 MILLIGRAM(S): 0.4 CAPSULE ORAL at 21:50

## 2023-05-31 RX ADMIN — Medication 81 MILLIGRAM(S): at 11:32

## 2023-05-31 RX ADMIN — Medication 650 MILLIGRAM(S): at 20:21

## 2023-05-31 RX ADMIN — Medication 2: at 12:29

## 2023-05-31 RX ADMIN — Medication 100 MILLIGRAM(S): at 17:06

## 2023-05-31 RX ADMIN — Medication 650 MILLIGRAM(S): at 05:04

## 2023-05-31 RX ADMIN — Medication 1 APPLICATION(S): at 17:06

## 2023-05-31 RX ADMIN — Medication 1 APPLICATION(S): at 11:32

## 2023-05-31 RX ADMIN — BUDESONIDE AND FORMOTEROL FUMARATE DIHYDRATE 2 PUFF(S): 160; 4.5 AEROSOL RESPIRATORY (INHALATION) at 05:10

## 2023-05-31 RX ADMIN — HEPARIN SODIUM 5000 UNIT(S): 5000 INJECTION INTRAVENOUS; SUBCUTANEOUS at 05:10

## 2023-05-31 RX ADMIN — INSULIN GLARGINE 25 UNIT(S): 100 INJECTION, SOLUTION SUBCUTANEOUS at 21:50

## 2023-05-31 RX ADMIN — CLOPIDOGREL BISULFATE 75 MILLIGRAM(S): 75 TABLET, FILM COATED ORAL at 11:32

## 2023-05-31 RX ADMIN — Medication 650 MILLIGRAM(S): at 19:23

## 2023-05-31 RX ADMIN — Medication 100 MILLIGRAM(S): at 05:11

## 2023-05-31 RX ADMIN — ATORVASTATIN CALCIUM 80 MILLIGRAM(S): 80 TABLET, FILM COATED ORAL at 21:50

## 2023-05-31 RX ADMIN — Medication 1: at 17:07

## 2023-05-31 RX ADMIN — Medication 100 MILLIGRAM(S): at 05:10

## 2023-05-31 NOTE — PROGRESS NOTE ADULT - ASSESSMENT
63 yo M PMHx of HFrEF EF 29%, ICM s/p AICD, ICD, IDDM2, COPD, LUIS ALFREDO, CKD3-4 (BL Cr 2.2) p/w sepsis due to cellulitis

## 2023-05-31 NOTE — PROGRESS NOTE ADULT - ASSESSMENT
61 yo M PMHx of HFrEF EF 29%, ICM, ICD, IDDM2, COPD, LUIS ALFREDO, CKD3-4, p/w 1 day of  N/V/D. Pt reports feeling unwell for the past week, recently saw his wound specialist for a chronic Right foot wound and had the wound debrided. On day of presentation to the ED, pt c/o multiple episodes of nausea, nbnb emesis, and nonbloody diarrhea. In the ED, febrile 101.5, SBP 95 range, Meds received - ancef 2g, NS 500cc, tylenol 650 mg, 1 g IV, bumex 4 mg, pepcid 20 mg, kcl 40 meq.       1- MANJIT on CKDIV  2- bacteremia  3- Cellulitis   4- CHF  5- Chronic R Clare      MANJIT in setting of infection, as well as requiring diuretics for CHF  appears hypervolemic on exam, however is breathing comfortably on RA.   hold diuretics for now, unless he becomes SOB  he is tolerating po intake, avoid IVF for now.  check stool culture  bacteremia, cefazolin 2G BID, monitor closely for drug reaction  ID following  chronic r hydro is unchanged, non-obstructing left renal calculus noted  strict I/O  trend creatinine and electrolytes daily

## 2023-05-31 NOTE — PROGRESS NOTE ADULT - ASSESSMENT
61 y/o male with PMH including HFrEF, ICM, ICD, IDDM2, COPD, LUIS ALFREDO, CKD3-4. obesity (BMI = 35.8) admitted 5/28/23  w/ 1 day of N/V/D, after debridement of chronic right foot wound    Strep dysgalactie bacteremia 5/28 +BC x2 - likley related to RLE infection  Bacteremia clearing   5/30 BC x1 NGTD  developed skin changes while on antibiotics March 2023 hospitalization raising concern about possible hypersensitivity  5/31: podiatry evaluation appreciated,Onychomycosis, Xerosis plantarly bilateral. Diabetes mellitus/neuropathy:   toe nails debrided    Suggest  Continue Cefazolin 5/28 -->     10 day course of antibiotics anticipated  - to consider change to po Keflex if pt improves and follow up cx remain neg  trend Creat  (if creat continues to rise will decrease frequency to q 12 hours)

## 2023-05-31 NOTE — PROGRESS NOTE ADULT - PROBLEM SELECTOR PLAN 5
Stabilized now, off diuretics  - suspect this is due to pre-renal disease- vomiting at home with diarrhea, only 500cc fluids given and diuretics were resumed  - Hold bumex, Nephrology, Dr. Cain was consulted  - UA with hyaline casts  - Continue trending renal function  - Avoid further IVF for now- eating and drinking well

## 2023-05-31 NOTE — PROGRESS NOTE ADULT - SUBJECTIVE AND OBJECTIVE BOX
[facility]  Lul Cruz  Office: [phone]  Cell: [phone]  Can also be reached on [employer] Teams     INTERVAL HPI/OVERNIGHT EVENTS: Chest pain last nighjt after symbicort, no further episodes. Feels well today- fatigued. No SOB or chest pain. No further fevers. No chillsor cough. diarrhea improved.       REVIEW OF SYSTEMS:  Negativeexcept per hpi    Vital Signs Last 24 Hrs  T(C): 36.3 (31 May 2023 11:36), Max: 37.2 (30 May 2023 15:47)  T(F): 97.3 (31 May 2023 11:36), Max: 99 (30 May 2023 19:36)  HR: 84 (31 May 2023 11:36) (84 - 100)  BP: 90/63 (31 May 2023 11:36) (90/63 - 97/61)  BP(mean): --  RR: 18 (31 May 2023 11:36) (18 - 18)  SpO2: 95% (31 May 2023 11:36) (90% - 95%)    Parameters below as of 31 May 2023 11:36  Patient On (Oxygen Delivery Method): room air        PHYSICAL EXAMINATION:  GENERAL: NAD, well built  HEAD:  Atraumatic, Normocephalic  EYES:  conjunctiva and sclera clear  NECK: Supple, No JVD, Normal thyroid  CHEST/LUNG: clear bilaterally, diminished, no obvious wheezing or crackles  HEART: Regular rate and rhythm  ABDOMEN: Soft, Nontender, enlarged, but softer than previous. Bowel sounds present  NERVOUS SYSTEM:  Alert & Oriented X4 to person place time and situation, no focal neurological deficits   EXTREMITIES:  2+ Peripheral Pulses, Mild pitting edema, redness on bilateral legs, worse on the right extending just proximal to knees. Open wound plantar surface of right foot, healing  SKIN: warm dry                          11.1   11.32 )-----------( 133      ( 30 May 2023 07:04 )             37.5     05-31    132<L>  |  95<L>  |  86<H>  ----------------------------<  150<H>  3.6   |  19<L>  |  3.01<H>    Ca    8.8      31 May 2023 07:16  Mg     2.6     05-31                CAPILLARY BLOOD GLUCOSE      RADIOLOGY & ADDITIONAL TESTS:

## 2023-05-31 NOTE — PROGRESS NOTE ADULT - SUBJECTIVE AND OBJECTIVE BOX
Podiatry pager #: [phone]/ [phone]    Patient is a 62y old  Male who presents with a chief complaint of weakness, N/V/D (30 May 2023 13:52) Podiatry consult today for painful tender thickened toenails of both feet aggravated by shoe gear ambulation and palpation      HPI:  61 yo M PMHx of HFrEF EF 29%, ICM, ICD, IDDM2, COPD, LUIS ALFREDO, CKD3-4 p/w 1 day of  N/V/D. Hx also obtained from wife at bedside. Pt reports feeling unwell for the past week, recently saw his wound specialist for a chronic r foot wound and had the wound debrided. Since then, he has not felt well and with chills at home. On day of presentation to the ED, pt c/o multiple episodes of nausea, nbnb emesis, and nonbloody diarrhea. Denies any sick contacts at home or anyone with similar symptoms including children. Wife feels this presentation is identical to prior admissions for sepsis in the setting of leg cellulitis after wound debridement. Pt denies any recent trauma to foot, any cp, sob.    In the ED VS reviewed stable - temp up to 101.5F, , Desat to 89% on RA  Meds received - ancef 2g, NS 500cc, tylenol 650 mg, 1 g IV, bumex 4 mg, pepcid 20 mg, kcl 40 meq (28 May 2023 17:20)      PAST MEDICAL & SURGICAL HISTORY:  Stented coronary artery      Diabetes      AICD (automatic cardioverter/defibrillator) present      Hypertension      Heart failure with reduced ejection fraction      History of ischemic cardiomyopathy      History of COPD      H/O gastroesophageal reflux (GERD)      2019 novel coronavirus disease (COVID-19)      COVID-19 vaccine series completed      H/O vasectomy  20 yrs ago (2000)          MEDICATIONS  (STANDING):  ammonium lactate 12% Lotion 1 Application(s) Topical two times a day  aspirin enteric coated 81 milliGRAM(s) Oral daily  atorvastatin 80 milliGRAM(s) Oral at bedtime  budesonide 160 MICROgram(s)/formoterol 4.5 MICROgram(s) Inhaler 2 Puff(s) Inhalation two times a day  ceFAZolin   IVPB 2000 milliGRAM(s) IV Intermittent every 12 hours  clopidogrel Tablet 75 milliGRAM(s) Oral daily  dextrose 5%. 1000 milliLiter(s) (50 mL/Hr) IV Continuous <Continuous>  dextrose 50% Injectable 25 Gram(s) IV Push once  heparin   Injectable 5000 Unit(s) SubCutaneous every 8 hours  insulin glargine Injectable (LANTUS) 25 Unit(s) SubCutaneous at bedtime  insulin lispro (ADMELOG) corrective regimen sliding scale   SubCutaneous three times a day before meals  metoprolol succinate  milliGRAM(s) Oral every 12 hours  pantoprazole    Tablet 40 milliGRAM(s) Oral before breakfast  tamsulosin 0.4 milliGRAM(s) Oral at bedtime    MEDICATIONS  (PRN):  acetaminophen     Tablet .. 650 milliGRAM(s) Oral every 6 hours PRN Temp greater or equal to 38C (100.4F), Mild Pain (1 - 3)  albuterol    90 MICROgram(s) HFA Inhaler 2 Puff(s) Inhalation every 6 hours PRN Shortness of Breath and/or Wheezing  ALPRAZolam 0.25 milliGRAM(s) Oral daily PRN anxiety  aluminum hydroxide/magnesium hydroxide/simethicone Suspension 30 milliLiter(s) Oral every 4 hours PRN Dyspepsia  dextrose Oral Gel 15 Gram(s) Oral once PRN Blood Glucose LESS THAN 70 milliGRAM(s)/deciliter  melatonin 3 milliGRAM(s) Oral at bedtime PRN Insomnia  ondansetron Injectable 4 milliGRAM(s) IV Push every 8 hours PRN Nausea and/or Vomiting      Allergies    doxepin (Other)  Zosyn (Pruritus)  vancomycin (Rash (Mild to Mod))  ceftriaxone (Rash)    Intolerances        VITALS:    Vital Signs Last 24 Hrs  T(C): 36.8 (31 May 2023 08:12), Max: 37.2 (30 May 2023 15:47)  T(F): 98.2 (31 May 2023 08:12), Max: 99 (30 May 2023 19:36)  HR: 85 (31 May 2023 08:12) (85 - 100)  BP: 92/63 (31 May 2023 08:12) (90/61 - 97/61)  BP(mean): --  RR: 18 (31 May 2023 08:12) (18 - 18)  SpO2: 92% (31 May 2023 08:12) (90% - 94%)    Parameters below as of 31 May 2023 08:12  Patient On (Oxygen Delivery Method): room air        LABS:                          11.1   11.32 )-----------( 133      ( 30 May 2023 07:04 )             37.5       05-31    132<L>  |  95<L>  |  86<H>  ----------------------------<  150<H>  3.6   |  19<L>  |  3.01<H>    Ca    8.8      31 May 2023 07:16  Mg     2.6     05-31        CAPILLARY BLOOD GLUCOSE      POCT Blood Glucose.: 156 mg/dL (31 May 2023 08:13)  POCT Blood Glucose.: 174 mg/dL (30 May 2023 21:33)  POCT Blood Glucose.: 166 mg/dL (30 May 2023 16:50)  POCT Blood Glucose.: 175 mg/dL (30 May 2023 12:00)          LOWER EXTREMITY PHYSICAL EXAM:    Vasular: DP/PT _1/4, B/L, CFT <_ 2seconds B/L, Temperature gradient _WNL B/L.   Neuro: Epicritic sensation Diminished_ to the level of _Toes, B/L. Previous left third toe imitation.  Skin: Xerosis plantarly bilateral.  Positive dystrophic hypertrophic brittle toenails with subungual debris all 10 digits.  No foot ulcerations or clinical signs of infection.      RADIOLOGY & ADDITIONAL STUDIES:

## 2023-05-31 NOTE — CHART NOTE - NSCHARTNOTEFT_GEN_A_CORE
Wound Care Team Note:    Request for wound care consult for foot wounds received. Wound care consult referred to Wound Care Team Podiatrists, Gretel Tovar/Tae/Richard. Will defer to podiatry for management. Please refer any questions/concerns to Podiatry. Will not follow.    Georgiana Hubbard NP-C, CWOCN via Teams

## 2023-05-31 NOTE — PROGRESS NOTE ADULT - SUBJECTIVE AND OBJECTIVE BOX
[facility]   [phone]  RENAL FOLLOW UP NOTE  --------------------------------------------------------------------------------  Chief Complaint:    24 hour events/subjective:    patient seen and examined  denies sob    PAST HISTORY  --------------------------------------------------------------------------------  No significant changes to PMH, PSH, FHx, SHx, unless otherwise noted    ALLERGIES & MEDICATIONS  --------------------------------------------------------------------------------  Allergies    doxepin (Other)  Zosyn (Pruritus)  vancomycin (Rash (Mild to Mod))  ceftriaxone (Rash)    Intolerances      Standing Inpatient Medications  ammonium lactate 12% Lotion 1 Application(s) Topical two times a day  aspirin enteric coated 81 milliGRAM(s) Oral daily  atorvastatin 80 milliGRAM(s) Oral at bedtime  ceFAZolin   IVPB 2000 milliGRAM(s) IV Intermittent every 12 hours  clopidogrel Tablet 75 milliGRAM(s) Oral daily  dextrose 5%. 1000 milliLiter(s) IV Continuous <Continuous>  dextrose 50% Injectable 25 Gram(s) IV Push once  heparin   Injectable 5000 Unit(s) SubCutaneous every 8 hours  insulin glargine Injectable (LANTUS) 25 Unit(s) SubCutaneous at bedtime  insulin lispro (ADMELOG) corrective regimen sliding scale   SubCutaneous three times a day before meals  metoprolol succinate  milliGRAM(s) Oral every 12 hours  pantoprazole    Tablet 40 milliGRAM(s) Oral before breakfast  tamsulosin 0.4 milliGRAM(s) Oral at bedtime    PRN Inpatient Medications  acetaminophen     Tablet .. 650 milliGRAM(s) Oral every 6 hours PRN  albuterol    90 MICROgram(s) HFA Inhaler 2 Puff(s) Inhalation every 6 hours PRN  ALPRAZolam 0.25 milliGRAM(s) Oral daily PRN  aluminum hydroxide/magnesium hydroxide/simethicone Suspension 30 milliLiter(s) Oral every 4 hours PRN  dextrose Oral Gel 15 Gram(s) Oral once PRN  melatonin 3 milliGRAM(s) Oral at bedtime PRN  ondansetron Injectable 4 milliGRAM(s) IV Push every 8 hours PRN      REVIEW OF SYSTEMS  --------------------------------------------------------------------------------    Gen: denies fevers/chills,  CVS: denies chest pain/palpitations  Resp: denies SOB/Cough  GI: Denies N/V/Abd pain  : Denies dysuria/oliguria/hematuria    VITALS/PHYSICAL EXAM  --------------------------------------------------------------------------------  T(C): 36.3 (05-31-23 @ 11:36), Max: 37.2 (05-30-23 @ 15:47)  HR: 84 (05-31-23 @ 11:36) (84 - 100)  BP: 90/63 (05-31-23 @ 11:36) (90/63 - 97/61)  RR: 18 (05-31-23 @ 11:36) (18 - 18)  SpO2: 95% (05-31-23 @ 11:36) (90% - 95%)  Wt(kg): --        05-30-23 @ 07:01  -  05-31-23 @ 07:00  --------------------------------------------------------  IN: 640 mL / OUT: 200 mL / NET: 440 mL    05-31-23 @ 07:01  -  05-31-23 @ 14:25  --------------------------------------------------------  IN: 480 mL / OUT: 0 mL / NET: 480 mL      Physical Exam:  	  	Gen: Non toxic comfortable appearing, on RA   	Pulm: decrease bs R>L, no rales or ronchi or wheezing  	CV: +JVD. RRR, S1S2; no rub  	Abd: +BS, soft, nontender/nondistended, obese  	: No suprapubic distention  	UE: Warm, no cyanosis  no clubbing,  no edema;   	LE: Warm, no cyanosis  no clubbing, RLE edema, erythema noted up to inner thigh. 2+edema    LABS/STUDIES  --------------------------------------------------------------------------------              11.1   11.32 >-----------<  133      [05-30-23 @ 07:04]              37.5     132  |  95  |  86  ----------------------------<  150      [05-31-23 @ 07:16]  3.6   |  19  |  3.01        Ca     8.8     [05-31-23 @ 07:16]      Mg     2.6     [05-31-23 @ 07:16]            Creatinine Trend:  SCr 3.01 [05-31 @ 07:16]  SCr 2.99 [05-30 @ 07:01]  SCr 2.55 [05-29 @ 07:38]  SCr 2.39 [05-28 @ 08:23]              Urinalysis - [05-31-23 @ 07:20]      Color Yellow / Appearance Slightly Turbid / SG 1.016 / pH 6.0      Gluc Negative / Ketone Negative  / Bili Negative / Urobili Negative       Blood Small / Protein 100 mg/dl / Leuk Est Negative / Nitrite Negative      RBC 2 / WBC 2 / Hyaline 14 / Gran 4 / Sq Epi  / Non Sq Epi  / Bacteria Negative    Urine Creatinine 130      [05-31-23 @ 07:20]  Urine Protein 74      [05-31-23 @ 07:20]    Iron 14, TIBC --, %sat --      [03-02-23 @ 01:20]  Ferritin 225      [03-02-23 @ 01:18]  PTH -- (Ca 8.9)      [10-02-22 @ 07:04]   73  HbA1c 8.9      [12-16-19 @ 08:15]  TSH 3.02      [09-30-22 @ 13:45]

## 2023-05-31 NOTE — PROGRESS NOTE ADULT - SUBJECTIVE AND OBJECTIVE BOX
Follow Up:  Strep dysgalactiae bacteremia    Interval History/ROS:  feels a little better    Allergies  doxepin (Other)  Zosyn (Pruritus)  vancomycin (Rash (Mild to Mod))  ceftriaxone (Rash)    ANTIMICROBIALS:  ceFAZolin   IVPB 2000 every 12 hours      OTHER MEDS:  MEDICATIONS  (STANDING):  acetaminophen     Tablet .. 650 every 6 hours PRN  albuterol    90 MICROgram(s) HFA Inhaler 2 every 6 hours PRN  ALPRAZolam 0.25 daily PRN  aluminum hydroxide/magnesium hydroxide/simethicone Suspension 30 every 4 hours PRN  aspirin enteric coated 81 daily  atorvastatin 80 at bedtime  clopidogrel Tablet 75 daily  dextrose 50% Injectable 25 once  dextrose Oral Gel 15 once PRN  heparin   Injectable 5000 every 8 hours  insulin glargine Injectable (LANTUS) 25 at bedtime  insulin lispro (ADMELOG) corrective regimen sliding scale  three times a day before meals  melatonin 3 at bedtime PRN  metoprolol succinate  every 12 hours  ondansetron Injectable 4 every 8 hours PRN  pantoprazole    Tablet 40 before breakfast  tamsulosin 0.4 at bedtime      Vital Signs Last 24 Hrs  T(C): 36.8 (31 May 2023 15:54), Max: 37.2 (30 May 2023 19:36)  T(F): 98.2 (31 May 2023 15:54), Max: 99 (30 May 2023 19:36)  HR: 90 (31 May 2023 15:54) (84 - 100)  BP: 90/60 (31 May 2023 15:54) (90/60 - 97/61)  BP(mean): --  RR: 18 (31 May 2023 15:54) (18 - 18)  SpO2: 96% (31 May 2023 15:54) (90% - 96%)    Parameters below as of 31 May 2023 15:54  Patient On (Oxygen Delivery Method): room air        PHYSICAL EXAM:  General: WN/WD NAD, Non-toxic  Neurology: A&Ox3, nonfocal  Respiratory: Clear to auscultation bilaterally  CV: RRR, S1S2, no murmurs, rubs or gallops  Abdominal: Soft, Non-tender, non-distended, normal bowel sounds  Extremities: 1+  edema  Line Sites: Clear  Skin: dark erythematous discoloration calves                          11.1   11.32 )-----------( 133      ( 30 May 2023 07:04 )             37.5   WBC Count: 11.32 ( @ 07:04)  WBC Count: 14.19 ( @ 07:38)  WBC Count: 18.61 ( @ 08:23)        132<L>  |  95<L>  |  86<H>  ----------------------------<  150<H>  3.6   |  19<L>  |  3.01<H>  Creatinine: 3.01 ( @ 07:16)    Creatinine: 2.99 ( @ 07:01)    Creatinine: 2.55 ( @ 07:38)    Creatinine: 2.39 ( @ 08:23)    Ca    8.8      31 May 2023 07:16  Mg     2.6         Urinalysis Basic - ( 31 May 2023 07:20 )    Color: Yellow / Appearance: Slightly Turbid / S.016 / pH: x  Gluc: x / Ketone: Negative  / Bili: Negative / Urobili: Negative   Blood: x / Protein: 100 mg/dl / Nitrite: Negative   Leuk Esterase: Negative / RBC: 2 /hpf / WBC 2 /HPF   Sq Epi: x / Non Sq Epi: x / Bacteria: Negative      MICROBIOLOGY:  .Blood Blood  23   No growth to date.  --  --      Clean Catch Clean Catch (Midstream)  23   <10,000 CFU/mL Normal Urogenital Dorothy  --  --      .Blood Blood-Peripheral  23   Growth in aerobic bottle: Streptococcus dysgalactiae (Group C/G)  See previous culture [id number]  --    Growth in aerobic bottle: Gram Positive Cocci in Pairs and Chains      .Blood Blood-Peripheral  23   Growth in anaerobic bottle: Streptococcus dysgalactiae (Group C/G)      Rapid RVP Result: NotDetec ( @ 08:22)    Clostridium difficile GDH Toxins A&amp;B, EIA:   Negative (23 @ 13:34)  Clostridium difficile GDH Interpretation: Negative for toxigenic C. Difficile.  This specimen is negative for C.  Difficile glutamate dehydrogenase (GDH) antigen and negative for C.  Difficile Toxins A & B, by EIA.   (23 @ 13:34)      RADIOLOGY:  < from: Xray Chest 1 View AP/PA (23 @ 10:36) >  IMPRESSION: Small right pleural effusion and/or right basilar   atelectasis. Please correlate with the CT scan of the chest performed the   same day as this exam.    < end of copied text >  < from: CT Abdomen and Pelvis No Cont (23 @ 09:15) >  CHEST:  LUNGS, PLEURA, AND LARGE AIRWAYS: Patent central airways. Emphysematous   changes. Multiple bilateral calcified granulomas similar when compared to   prior exam. Small right-sided pleural effusion with adjacent compressive   atelectasis.  VESSELS: Coronary artery and aortic calcifications. Coronary stents in   place.  HEART: Cardiomegaly. No pericardial effusion.  MEDIASTINUM AND BOB: Largely unchanged mediastinal lymphadenopathy. For   reference a prevascular lymph node measures 1.6 x 1.2 cm previously 1.7 x   1.1 cm (2, 30).  CHEST WALL AND LOWER NECK: Left chest wall AICD.    ABDOMEN AND PELVIS:  LIVER: Hepatomegaly with steatosis.  BILE DUCTS: Normal caliber.  GALLBLADDER: Within normal limits.  SPLEEN: Splenomegaly.  PANCREAS: Within normal limits.  ADRENALS: Within normal limits.  KIDNEYS/URETERS: Similar-appearing severe chronic right-sided hydroureter   with renal cortical thinning. Punctate nonobstructing left renal   stones.No obstructing renal calculi bilaterally. No left-sided   hydronephrosis.    BLADDER: Mild bladder wall thickening. Previously visualized bladder   diverticula not well appreciated on this scan.  REPRODUCTIVE ORGANS: Prostate is enlarged.    BOWEL: Colonic diverticulosis without diverticulitis. No bowel   obstruction. Status post appendectomy. No colonic wall thickeningto   suggest a colitis.  PERITONEUM: No ascites.  VESSELS: Atherosclerotic changes.  RETROPERITONEUM/LYMPH NODES: 1.5 cm para-aortic lymph node unchanged from   prior exam with additional scattered subcentimeter mediastinal lymph   nodes.  ABDOMINAL WALL: Within normal limits.  BONES: Degenerative changes.    IMPRESSION:  Small right-sided pleural effusion with adjacent compressive atelectasis   increased when compared to prior exam from 3/1/2023.    Mild bladder wall thickening. Recommend correlation with urinalysis.    Unchanged intra-abdominal findings when compared to 3/2/2023 as described   above.    < end of copied text >    Sharath Morris MD; Division of Infectious Disease; Pager: [phone]; nights and weekends: [phone]

## 2023-05-31 NOTE — PROGRESS NOTE ADULT - ASSESSMENT
Assessment/plan:    Onychomycosis secondary to do modified.  Xerosis plantarly bilateral.  Diabetes mellitus/neuropathy    Aseptic debridement of mycotic nails all 10 digits with Betadine applied.  Recommend ammonium lactate lotion to feet daily.  Recommend continued routine podiatric foot care as an outpatient.  With Dr. Strong at [phone]

## 2023-06-01 ENCOUNTER — APPOINTMENT (OUTPATIENT)
Dept: CARDIOLOGY | Facility: CLINIC | Age: 63
End: 2023-06-01

## 2023-06-01 LAB
ANION GAP SERPL CALC-SCNC: 18 MMOL/L — HIGH (ref 5–17)
ANION GAP SERPL CALC-SCNC: 18 MMOL/L — HIGH (ref 5–17)
BUN SERPL-MCNC: 90 MG/DL — HIGH (ref 7–23)
BUN SERPL-MCNC: 90 MG/DL — HIGH (ref 7–23)
CALCIUM SERPL-MCNC: 8.6 MG/DL — SIGNIFICANT CHANGE UP (ref 8.4–10.5)
CALCIUM SERPL-MCNC: 8.8 MG/DL — SIGNIFICANT CHANGE UP (ref 8.4–10.5)
CHLORIDE SERPL-SCNC: 92 MMOL/L — LOW (ref 96–108)
CHLORIDE SERPL-SCNC: 92 MMOL/L — LOW (ref 96–108)
CO2 SERPL-SCNC: 19 MMOL/L — LOW (ref 22–31)
CO2 SERPL-SCNC: 19 MMOL/L — LOW (ref 22–31)
CREAT SERPL-MCNC: 2.73 MG/DL — HIGH (ref 0.5–1.3)
CREAT SERPL-MCNC: 2.74 MG/DL — HIGH (ref 0.5–1.3)
EGFR: 26 ML/MIN/1.73M2 — LOW
EGFR: 26 ML/MIN/1.73M2 — LOW
GLUCOSE BLDC GLUCOMTR-MCNC: 172 MG/DL — HIGH (ref 70–99)
GLUCOSE BLDC GLUCOMTR-MCNC: 197 MG/DL — HIGH (ref 70–99)
GLUCOSE BLDC GLUCOMTR-MCNC: 233 MG/DL — HIGH (ref 70–99)
GLUCOSE BLDC GLUCOMTR-MCNC: 252 MG/DL — HIGH (ref 70–99)
GLUCOSE SERPL-MCNC: 158 MG/DL — HIGH (ref 70–99)
GLUCOSE SERPL-MCNC: 163 MG/DL — HIGH (ref 70–99)
HCT VFR BLD CALC: 39.1 % — SIGNIFICANT CHANGE UP (ref 39–50)
HGB BLD-MCNC: 11.9 G/DL — LOW (ref 13–17)
MAGNESIUM SERPL-MCNC: 2.6 MG/DL — SIGNIFICANT CHANGE UP (ref 1.6–2.6)
MAGNESIUM SERPL-MCNC: 2.7 MG/DL — HIGH (ref 1.6–2.6)
MCHC RBC-ENTMCNC: 22.7 PG — LOW (ref 27–34)
MCHC RBC-ENTMCNC: 30.4 GM/DL — LOW (ref 32–36)
MCV RBC AUTO: 74.5 FL — LOW (ref 80–100)
NRBC # BLD: 0 /100 WBCS — SIGNIFICANT CHANGE UP (ref 0–0)
PLATELET # BLD AUTO: 136 K/UL — LOW (ref 150–400)
POTASSIUM SERPL-MCNC: 3.6 MMOL/L — SIGNIFICANT CHANGE UP (ref 3.5–5.3)
POTASSIUM SERPL-MCNC: 3.7 MMOL/L — SIGNIFICANT CHANGE UP (ref 3.5–5.3)
POTASSIUM SERPL-SCNC: 3.6 MMOL/L — SIGNIFICANT CHANGE UP (ref 3.5–5.3)
POTASSIUM SERPL-SCNC: 3.7 MMOL/L — SIGNIFICANT CHANGE UP (ref 3.5–5.3)
RBC # BLD: 5.25 M/UL — SIGNIFICANT CHANGE UP (ref 4.2–5.8)
RBC # FLD: 19.1 % — HIGH (ref 10.3–14.5)
SODIUM SERPL-SCNC: 129 MMOL/L — LOW (ref 135–145)
SODIUM SERPL-SCNC: 129 MMOL/L — LOW (ref 135–145)
WBC # BLD: 9.09 K/UL — SIGNIFICANT CHANGE UP (ref 3.8–10.5)
WBC # FLD AUTO: 9.09 K/UL — SIGNIFICANT CHANGE UP (ref 3.8–10.5)

## 2023-06-01 PROCEDURE — 99232 SBSQ HOSP IP/OBS MODERATE 35: CPT

## 2023-06-01 RX ORDER — INSULIN LISPRO 100/ML
VIAL (ML) SUBCUTANEOUS
Refills: 0 | Status: DISCONTINUED | OUTPATIENT
Start: 2023-06-01 | End: 2023-06-03

## 2023-06-01 RX ORDER — CEFAZOLIN SODIUM 1 G
2000 VIAL (EA) INJECTION EVERY 8 HOURS
Refills: 0 | Status: DISCONTINUED | OUTPATIENT
Start: 2023-06-01 | End: 2023-06-03

## 2023-06-01 RX ORDER — INSULIN GLARGINE 100 [IU]/ML
30 INJECTION, SOLUTION SUBCUTANEOUS AT BEDTIME
Refills: 0 | Status: DISCONTINUED | OUTPATIENT
Start: 2023-06-01 | End: 2023-06-03

## 2023-06-01 RX ORDER — BUMETANIDE 0.25 MG/ML
2 INJECTION INTRAMUSCULAR; INTRAVENOUS
Refills: 0 | Status: DISCONTINUED | OUTPATIENT
Start: 2023-06-01 | End: 2023-06-02

## 2023-06-01 RX ORDER — INSULIN LISPRO 100/ML
VIAL (ML) SUBCUTANEOUS AT BEDTIME
Refills: 0 | Status: DISCONTINUED | OUTPATIENT
Start: 2023-06-01 | End: 2023-06-03

## 2023-06-01 RX ORDER — BUMETANIDE 0.25 MG/ML
2 INJECTION INTRAMUSCULAR; INTRAVENOUS
Refills: 0 | Status: DISCONTINUED | OUTPATIENT
Start: 2023-06-01 | End: 2023-06-01

## 2023-06-01 RX ADMIN — Medication 100 MILLIGRAM(S): at 21:43

## 2023-06-01 RX ADMIN — Medication 1: at 08:47

## 2023-06-01 RX ADMIN — Medication 81 MILLIGRAM(S): at 11:41

## 2023-06-01 RX ADMIN — Medication 650 MILLIGRAM(S): at 19:32

## 2023-06-01 RX ADMIN — Medication 4: at 17:11

## 2023-06-01 RX ADMIN — Medication 100 MILLIGRAM(S): at 05:37

## 2023-06-01 RX ADMIN — Medication 100 MILLIGRAM(S): at 16:32

## 2023-06-01 RX ADMIN — Medication 1 APPLICATION(S): at 05:36

## 2023-06-01 RX ADMIN — HEPARIN SODIUM 5000 UNIT(S): 5000 INJECTION INTRAVENOUS; SUBCUTANEOUS at 12:45

## 2023-06-01 RX ADMIN — INSULIN GLARGINE 30 UNIT(S): 100 INJECTION, SOLUTION SUBCUTANEOUS at 22:43

## 2023-06-01 RX ADMIN — Medication 650 MILLIGRAM(S): at 18:23

## 2023-06-01 RX ADMIN — Medication 1 APPLICATION(S): at 18:22

## 2023-06-01 RX ADMIN — CLOPIDOGREL BISULFATE 75 MILLIGRAM(S): 75 TABLET, FILM COATED ORAL at 11:41

## 2023-06-01 RX ADMIN — HEPARIN SODIUM 5000 UNIT(S): 5000 INJECTION INTRAVENOUS; SUBCUTANEOUS at 05:36

## 2023-06-01 RX ADMIN — Medication 100 MILLIGRAM(S): at 05:36

## 2023-06-01 RX ADMIN — PANTOPRAZOLE SODIUM 40 MILLIGRAM(S): 20 TABLET, DELAYED RELEASE ORAL at 05:36

## 2023-06-01 RX ADMIN — ATORVASTATIN CALCIUM 80 MILLIGRAM(S): 80 TABLET, FILM COATED ORAL at 21:44

## 2023-06-01 RX ADMIN — Medication 100 MILLIGRAM(S): at 18:27

## 2023-06-01 RX ADMIN — HEPARIN SODIUM 5000 UNIT(S): 5000 INJECTION INTRAVENOUS; SUBCUTANEOUS at 21:44

## 2023-06-01 RX ADMIN — Medication 3: at 12:44

## 2023-06-01 RX ADMIN — TAMSULOSIN HYDROCHLORIDE 0.4 MILLIGRAM(S): 0.4 CAPSULE ORAL at 21:45

## 2023-06-01 NOTE — PROVIDER CONTACT NOTE (CRITICAL VALUE NOTIFICATION) - SITUATION
wbc 0.39, platelet 17
gram positive cocci in pairs and chains in anaerobic bottle
gram positive cocci in pairs and chains in aerobic bottle

## 2023-06-01 NOTE — PROGRESS NOTE ADULT - PROBLEM SELECTOR PLAN 5
Improving, probably due to prerenal initially  Diuretics held for 4 doses and improving renal function today  Resume bumex 2mg BID  Appreciate nephro recs

## 2023-06-01 NOTE — PHARMACOTHERAPY INTERVENTION NOTE - COMMENTS
SEVERIANO MARTINEZ, 62y Male with Streptococcus dysgalactiae bacteremia, source likely secondary to lower extremity cellulitis. Patient was started on cefazolin empirically. Of note, has multiple abx allergies listed (ceftriaxone, zosyn, and vancomycin), though patient tolerating cefazolin currently. Patient is CKD at baseline but had an increasing serum creatinine, which is now starting to downtrend and CrCl > 30 mL/min.    Recommendation(s):  1) Based on decreasing serum creatinine/resolving MANJIT, would suggest increasing cefazolin dose to 2 grams IV Q8H.  2) Monitor renal function and adjust dose as needed.    With kind regards,  Nicola Schultz, PharmD, BCIDP  Infectious Diseases Clinical Pharmacist  Available on [employer] Teams  .
SEVERIANO MARTINEZ, 62y Male with Streptococcus dysgalactiae bacteremia, source likely secondary to lower extremity cellulitis. Patient was started on cefazolin empirically. Of note, has multiple abx allergies listed (ceftriaxone, zosyn, and vancomycin), though patient tolerating cefazolin currently. Patient is CKD at baseline but now with an increasing serum creatinine (today it is 3 mg/dL).    Recommendation(s):  1) Based on concern for MANJIT/increasing serum creatinine, would suggest decreasing cefazolin dose to 2 grams IV Q12H.  2) Monitor renal function and adjust dose as needed.  3) Consider ID consult given bacteremia and ICD.    With kind regards,  Nicola Schultz, PharmD, BCIDP  Infectious Diseases Clinical Pharmacist  Available on [employer] Teams  .

## 2023-06-01 NOTE — PROGRESS NOTE ADULT - SUBJECTIVE AND OBJECTIVE BOX
[facility]  Lul Cruz  Office: [phone]  Cell: [phone]  Can also be reached on [employer] Teams       INTERVAL HPI/OVERNIGHT EVENTS: No overnight events      REVIEW OF SYSTEMS:  Negative except per HPI    Vital Signs Last 24 Hrs  T(C): 36.9 (01 Jun 2023 11:13), Max: 36.9 (01 Jun 2023 11:13)  T(F): 98.4 (01 Jun 2023 11:13), Max: 98.4 (01 Jun 2023 11:13)  HR: 79 (01 Jun 2023 11:13) (79 - 93)  BP: 92/60 (01 Jun 2023 11:13) (90/60 - 92/60)  BP(mean): --  RR: 19 (01 Jun 2023 11:13) (18 - 19)  SpO2: 96% (01 Jun 2023 11:13) (91% - 96%)    Parameters below as of 01 Jun 2023 11:13  Patient On (Oxygen Delivery Method): room air        PHYSICAL EXAMINATION:  GENERAL: NAD, well built  HEAD:  Atraumatic, Normocephalic  EYES:  conjunctiva and sclera clear  NECK: Supple, No JVD, Normal thyroid  CHEST/LUNG: clear bilaterally, diminished, no obvious wheezing or crackles  HEART: Regular rate and rhythm  ABDOMEN: Soft, Nontender, enlarged, but softer than previous. Bowel sounds present  NERVOUS SYSTEM:  Alert & Oriented X4 to person place time and situation, no focal neurological deficits   EXTREMITIES:  2+ Peripheral Pulses, Increasing pitting edema especially on the right, with pitting edema extending to the distal thigh, redness on bilateral legs, worse on the right extending just proximal to knees. Open wound plantar surface of right foot, healing  SKIN: warm dry                          11.9   9.09  )-----------( 136      ( 01 Jun 2023 07:17 )             39.1     06-01    129<L>  |  92<L>  |  90<H>  ----------------------------<  158<H>  3.7   |  19<L>  |  2.73<H>    Ca    8.8      01 Jun 2023 07:19  Mg     2.7     06-01                CAPILLARY BLOOD GLUCOSE      RADIOLOGY & ADDITIONAL TESTS:

## 2023-06-01 NOTE — PROVIDER CONTACT NOTE (CRITICAL VALUE NOTIFICATION) - TEST AND RESULT REPORTED:
gram positive cocci in pairs and chains in anaerobic bottle
gram positive cocci in pairs and chains in aerobic bottle
wbc 0.39, platelet 17

## 2023-06-01 NOTE — PROGRESS NOTE ADULT - PROBLEM SELECTOR PLAN 7
Needs better control, increase lantus 30 units. Increase sliding scale to moderate  Renal function improving.

## 2023-06-01 NOTE — PROVIDER CONTACT NOTE (CRITICAL VALUE NOTIFICATION) - ACTION/TREATMENT ORDERED:
no new orders at this time
ACP aware. As per ACP, pt receiving cefazolin, will tell the day team.
ACP aware. As per ACP, pt already on broad spectrum antibiotics, will continue to monitor through the night and will follow up with day team.

## 2023-06-01 NOTE — PROGRESS NOTE ADULT - SUBJECTIVE AND OBJECTIVE BOX
Follow Up:  bacteremia     Interval History/ROS:  feels ok notes some tightness from swollen legs    Allergies  doxepin (Other)  Zosyn (Pruritus)  vancomycin (Rash (Mild to Mod))  ceftriaxone (Rash)    ANTIMICROBIALS:  ceFAZolin   IVPB 2000 every 12 hours      OTHER MEDS:  MEDICATIONS  (STANDING):  acetaminophen     Tablet .. 650 every 6 hours PRN  albuterol    90 MICROgram(s) HFA Inhaler 2 every 6 hours PRN  ALPRAZolam 0.25 daily PRN  aluminum hydroxide/magnesium hydroxide/simethicone Suspension 30 every 4 hours PRN  aspirin enteric coated 81 daily  atorvastatin 80 at bedtime  clopidogrel Tablet 75 daily  dextrose 50% Injectable 25 once  dextrose Oral Gel 15 once PRN  heparin   Injectable 5000 every 8 hours  insulin glargine Injectable (LANTUS) 25 at bedtime  insulin lispro (ADMELOG) corrective regimen sliding scale  three times a day before meals  melatonin 3 at bedtime PRN  metoprolol succinate  every 12 hours  ondansetron Injectable 4 every 8 hours PRN  pantoprazole    Tablet 40 before breakfast  tamsulosin 0.4 at bedtime      Vital Signs Last 24 Hrs  T(C): 36.9 (2023 11:13), Max: 36.9 (2023 11:13)  T(F): 98.4 (2023 11:13), Max: 98.4 (2023 11:13)  HR: 79 (2023 11:13) (79 - 93)  BP: 92/60 (2023 11:13) (90/60 - 92/60)  BP(mean): --  RR: 19 (2023 11:13) (18 - 19)  SpO2: 96% (2023 11:13) (91% - 96%)    Parameters below as of 2023 11:13  Patient On (Oxygen Delivery Method): room air        PHYSICAL EXAM:  General: WN/WD NAD, Non-toxic  Neurology: A&Ox3, nonfocal  Respiratory: Clear to auscultation bilaterally  CV: RRR, S1S2, no murmurs, rubs or gallops  Abdominal: Soft, Non-tender, non-distended, normal bowel sounds  Extremities: 2+  edema,   Line Sites: Clear  Skin: dark red/purplish discoloration R>L LE                          11.9   9.09  )-----------( 136      ( 2023 07:17 )             39.1   WBC Count: 9.09 ( @ 07:17)  WBC Count: 11.32 ( @ 07:04)  WBC Count: 14.19 ( @ 07:38)  WBC Count: 18.61 ( @ 08:23)        129<L>  |  92<L>  |  90<H>  ----------------------------<  158<H>  3.7   |  19<L>  |  2.73<H>  Creatinine: 2.73 ( @ 07:19)    Creatinine: 3.01 ( @ 07:16)    Creatinine: 2.99 ( @ 07:01)    Creatinine: 2.55 ( @ 07:38)    Creatinine: 2.39 ( @ 08:23)      Ca    8.8      2023 07:19  Mg     2.7             Urinalysis Basic - ( 31 May 2023 07:20 )    Color: Yellow / Appearance: Slightly Turbid / S.016 / pH: x  Gluc: x / Ketone: Negative  / Bili: Negative / Urobili: Negative   Blood: x / Protein: 100 mg/dl / Nitrite: Negative   Leuk Esterase: Negative / RBC: 2 /hpf / WBC 2 /HPF   Sq Epi: x / Non Sq Epi: x / Bacteria: Negative      MICROBIOLOGY:  .Blood Blood  23   No growth to date.  --  --      .Blood Blood  23   No growth to date.  --  --      Clean Catch Clean Catch (Midstream)  23   <10,000 CFU/mL Normal Urogenital Dorothy  --  --      .Blood Blood-Peripheral  23   Growth in aerobic bottle: Streptococcus dysgalactiae (Group C/G)  See previous culture [id number]  --    Growth in aerobic bottle: Gram Positive Cocci in Pairs and Chains      .Blood Blood-Peripheral  23   Growth in anaerobic bottle: Streptococcus dysgalactiae (Group C/G)    Rapid RVP Result: NotDetec ( @ 08:22)    Clostridium difficile GDH Toxins A&amp;B, EIA:   Negative (23 @ 13:34)  Clostridium difficile GDH Interpretation: Negative for toxigenic C. Difficile.  This specimen is negative for C.  Difficile glutamate dehydrogenase (GDH) antigen and negative for C.  Difficile Toxins A & B, by EIA.  GDH is a highly sensitive screening  marker for C. Difficile that is produced in large amounts by all C.  Difficile strains, both toxigenic and nontoxigenic.  This assay has not  been validated as a test of cure.  Repeat testing during the same episode  of diarrhea is of limited value and is discouraged.  The results of this  assay should always be interpreted in conjunction with pateint's clinical  history. (23 @ 13:34)      RADIOLOGY:  < from: Xray Chest 1 View AP/PA (23 @ 10:36) >  IMPRESSION: Small right pleural effusion and/or right basilar   atelectasis. Please correlate with the CT scan of the chest performed the   same day as this exam.    < end of copied text >      Sharath Morris MD; Division of Infectious Disease; Pager: [phone]; nights and weekends: [phone]

## 2023-06-01 NOTE — PROGRESS NOTE ADULT - ASSESSMENT
61 yo M PMHx of HFrEF EF 29%, ICM, ICD, IDDM2, COPD, LUIS ALFREDO, CKD3-4, p/w 1 day of  N/V/D. Pt reports feeling unwell for the past week, recently saw his wound specialist for a chronic Right foot wound and had the wound debrided. On day of presentation to the ED, pt c/o multiple episodes of nausea, nbnb emesis, and nonbloody diarrhea. In the ED, febrile 101.5, SBP 95 range, Meds received - ancef 2g, NS 500cc, tylenol 650 mg, 1 g IV, bumex 4 mg, pepcid 20 mg, kcl 40 meq.       1- MANJIT on CKDIV  2- bacteremia  3- Cellulitis   4- CHF  5- Chronic R [place]      MANJIT in setting of infection, as well as requiring diuretics for CHF  creatinine is improving.  add bumex 2mg po bid, given he is sob  he is tolerating po intake,   bacteremia, cefazolin 2G BID, monitor closely for drug reaction  ID following  chronic r hydro is unchanged, non-obstructing left renal calculus noted  metoprolol 100 mg BID, may need to decrease to allow for diuresis given BP is low  strict I/O  trend creatinine and electrolytes daily

## 2023-06-01 NOTE — PROGRESS NOTE ADULT - ASSESSMENT
61 y/o male with PMH including HFrEF, ICM, ICD, IDDM2, COPD, LUIS ALFREDO, CKD3-4. obesity (BMI = 35.8) admitted 5/28/23  w/ 1 day of N/V/D, after debridement of chronic right foot wound    Strep dysgalactie bacteremia 5/28 +BC x2 - likley related to RLE infection  Bacteremia clearing   5/30 BC x1 NGTD; 5/31 BC x2 NGTD    developed skin changes while on antibiotics March 2023 hospitalization raising concern about possible hypersensitivity  5/31: podiatry evaluation appreciated, Onychomycosis, Xerosis plantarly bilateral. Diabetes mellitus/neuropathy:   toe nails debrided  6/1: improved renal function -- chronic r hydro is unchanged, non-obstructing left renal calculus noted    Suggest  Increase dose Ceazolin (I ordered)  Continue Cefazolin 5/28 -->     10 day course of antibiotics anticipated  - to consider change to po Keflex on 6/4 --> 6/7 if reamains stable  trend Creat  (if creat continues to rise will decrease frequency to q 12 hours)

## 2023-06-01 NOTE — PROGRESS NOTE ADULT - SUBJECTIVE AND OBJECTIVE BOX
[facility]   [phone]  RENAL FOLLOW UP NOTE  --------------------------------------------------------------------------------  Chief Complaint:    24 hour events/subjective:    patient seen and examined.   states he feels sob this am    PAST HISTORY  --------------------------------------------------------------------------------  No significant changes to PMH, PSH, FHx, SHx, unless otherwise noted    ALLERGIES & MEDICATIONS  --------------------------------------------------------------------------------  Allergies    doxepin (Other)  Zosyn (Pruritus)  vancomycin (Rash (Mild to Mod))  ceftriaxone (Rash)    Intolerances      Standing Inpatient Medications  ammonium lactate 12% Lotion 1 Application(s) Topical two times a day  aspirin enteric coated 81 milliGRAM(s) Oral daily  atorvastatin 80 milliGRAM(s) Oral at bedtime  buMETAnide 2 milliGRAM(s) Oral two times a day  ceFAZolin   IVPB 2000 milliGRAM(s) IV Intermittent every 8 hours  clopidogrel Tablet 75 milliGRAM(s) Oral daily  dextrose 5%. 1000 milliLiter(s) IV Continuous <Continuous>  dextrose 50% Injectable 25 Gram(s) IV Push once  heparin   Injectable 5000 Unit(s) SubCutaneous every 8 hours  insulin glargine Injectable (LANTUS) 25 Unit(s) SubCutaneous at bedtime  insulin lispro (ADMELOG) corrective regimen sliding scale   SubCutaneous three times a day before meals  metoprolol succinate  milliGRAM(s) Oral every 12 hours  pantoprazole    Tablet 40 milliGRAM(s) Oral before breakfast  tamsulosin 0.4 milliGRAM(s) Oral at bedtime    PRN Inpatient Medications  acetaminophen     Tablet .. 650 milliGRAM(s) Oral every 6 hours PRN  albuterol    90 MICROgram(s) HFA Inhaler 2 Puff(s) Inhalation every 6 hours PRN  ALPRAZolam 0.25 milliGRAM(s) Oral daily PRN  aluminum hydroxide/magnesium hydroxide/simethicone Suspension 30 milliLiter(s) Oral every 4 hours PRN  dextrose Oral Gel 15 Gram(s) Oral once PRN  melatonin 3 milliGRAM(s) Oral at bedtime PRN  ondansetron Injectable 4 milliGRAM(s) IV Push every 8 hours PRN      REVIEW OF SYSTEMS  --------------------------------------------------------------------------------    Gen: denies fevers/chills,  CVS: denies chest pain/palpitations  Resp: denies SOB/Cough  GI: Denies N/V/Abd pain  : Denies dysuria/oliguria/hematuria    VITALS/PHYSICAL EXAM  --------------------------------------------------------------------------------  T(C): 36.9 (06-01-23 @ 11:13), Max: 36.9 (06-01-23 @ 11:13)  HR: 79 (06-01-23 @ 11:13) (79 - 93)  BP: 92/60 (06-01-23 @ 11:13) (90/60 - 92/60)  RR: 19 (06-01-23 @ 11:13) (18 - 19)  SpO2: 96% (06-01-23 @ 11:13) (91% - 96%)  Wt(kg): --        05-31-23 @ 07:01  -  06-01-23 @ 07:00  --------------------------------------------------------  IN: 480 mL / OUT: 0 mL / NET: 480 mL      Physical Exam:  	  	Gen: Non toxic comfortable appearing, on RA   	Pulm: decrease bs R>L, no rales or ronchi or wheezing  	CV: +JVD. RRR, S1S2; no rub  	Abd: +BS, soft, nontender/nondistended, obese  	: No suprapubic distention  	UE: Warm, no cyanosis  no clubbing,  no edema;   	LE: Warm, no cyanosis  no clubbing, RLE edema, erythema noted up to inner thigh. 2+edema    LABS/STUDIES  --------------------------------------------------------------------------------              11.9   9.09  >-----------<  136      [06-01-23 @ 07:17]              39.1     129  |  92  |  90  ----------------------------<  158      [06-01-23 @ 07:19]  3.7   |  19  |  2.73        Ca     8.8     [06-01-23 @ 07:19]      Mg     2.7     [06-01-23 @ 07:19]            Creatinine Trend:  SCr 2.73 [06-01 @ 07:19]  SCr 3.01 [05-31 @ 07:16]  SCr 2.99 [05-30 @ 07:01]  SCr 2.55 [05-29 @ 07:38]  SCr 2.39 [05-28 @ 08:23]              Urinalysis - [05-31-23 @ 07:20]      Color Yellow / Appearance Slightly Turbid / SG 1.016 / pH 6.0      Gluc Negative / Ketone Negative  / Bili Negative / Urobili Negative       Blood Small / Protein 100 mg/dl / Leuk Est Negative / Nitrite Negative      RBC 2 / WBC 2 / Hyaline 14 / Gran 4 / Sq Epi  / Non Sq Epi  / Bacteria Negative    Urine Creatinine 130      [05-31-23 @ 07:20]  Urine Protein 74      [05-31-23 @ 07:20]    Iron 14, TIBC --, %sat --      [03-02-23 @ 01:20]  Ferritin 225      [03-02-23 @ 01:18]  PTH -- (Ca 8.9)      [10-02-22 @ 07:04]   73  HbA1c 8.9      [12-16-19 @ 08:15]  TSH 3.02      [09-30-22 @ 13:45]

## 2023-06-02 LAB
ANION GAP SERPL CALC-SCNC: 17 MMOL/L — SIGNIFICANT CHANGE UP (ref 5–17)
BUN SERPL-MCNC: 92 MG/DL — HIGH (ref 7–23)
CALCIUM SERPL-MCNC: 8.3 MG/DL — LOW (ref 8.4–10.5)
CHLORIDE SERPL-SCNC: 95 MMOL/L — LOW (ref 96–108)
CO2 SERPL-SCNC: 19 MMOL/L — LOW (ref 22–31)
CREAT SERPL-MCNC: 2.37 MG/DL — HIGH (ref 0.5–1.3)
EGFR: 31 ML/MIN/1.73M2 — LOW
GLUCOSE BLDC GLUCOMTR-MCNC: 153 MG/DL — HIGH (ref 70–99)
GLUCOSE BLDC GLUCOMTR-MCNC: 159 MG/DL — HIGH (ref 70–99)
GLUCOSE BLDC GLUCOMTR-MCNC: 193 MG/DL — HIGH (ref 70–99)
GLUCOSE BLDC GLUCOMTR-MCNC: 193 MG/DL — HIGH (ref 70–99)
GLUCOSE SERPL-MCNC: 147 MG/DL — HIGH (ref 70–99)
HCT VFR BLD CALC: 38.5 % — LOW (ref 39–50)
HGB BLD-MCNC: 11.7 G/DL — LOW (ref 13–17)
MAGNESIUM SERPL-MCNC: 2.9 MG/DL — HIGH (ref 1.6–2.6)
MCHC RBC-ENTMCNC: 22.5 PG — LOW (ref 27–34)
MCHC RBC-ENTMCNC: 30.4 GM/DL — LOW (ref 32–36)
MCV RBC AUTO: 74 FL — LOW (ref 80–100)
NRBC # BLD: 0 /100 WBCS — SIGNIFICANT CHANGE UP (ref 0–0)
PLATELET # BLD AUTO: 152 K/UL — SIGNIFICANT CHANGE UP (ref 150–400)
POTASSIUM SERPL-MCNC: 3.4 MMOL/L — LOW (ref 3.5–5.3)
POTASSIUM SERPL-SCNC: 3.4 MMOL/L — LOW (ref 3.5–5.3)
RBC # BLD: 5.2 M/UL — SIGNIFICANT CHANGE UP (ref 4.2–5.8)
RBC # FLD: 19 % — HIGH (ref 10.3–14.5)
SODIUM SERPL-SCNC: 131 MMOL/L — LOW (ref 135–145)
WBC # BLD: 8.71 K/UL — SIGNIFICANT CHANGE UP (ref 3.8–10.5)
WBC # FLD AUTO: 8.71 K/UL — SIGNIFICANT CHANGE UP (ref 3.8–10.5)

## 2023-06-02 PROCEDURE — 93971 EXTREMITY STUDY: CPT | Mod: 26,RT

## 2023-06-02 PROCEDURE — 99232 SBSQ HOSP IP/OBS MODERATE 35: CPT

## 2023-06-02 PROCEDURE — 99222 1ST HOSP IP/OBS MODERATE 55: CPT

## 2023-06-02 RX ORDER — METOPROLOL TARTRATE 50 MG
50 TABLET ORAL EVERY 12 HOURS
Refills: 0 | Status: DISCONTINUED | OUTPATIENT
Start: 2023-06-02 | End: 2023-06-03

## 2023-06-02 RX ORDER — BUMETANIDE 0.25 MG/ML
2 INJECTION INTRAMUSCULAR; INTRAVENOUS
Refills: 0 | Status: DISCONTINUED | OUTPATIENT
Start: 2023-06-02 | End: 2023-06-03

## 2023-06-02 RX ORDER — POTASSIUM CHLORIDE 20 MEQ
40 PACKET (EA) ORAL ONCE
Refills: 0 | Status: COMPLETED | OUTPATIENT
Start: 2023-06-02 | End: 2023-06-02

## 2023-06-02 RX ADMIN — Medication 100 MILLIGRAM(S): at 22:01

## 2023-06-02 RX ADMIN — HEPARIN SODIUM 5000 UNIT(S): 5000 INJECTION INTRAVENOUS; SUBCUTANEOUS at 05:43

## 2023-06-02 RX ADMIN — Medication 40 MILLIEQUIVALENT(S): at 08:41

## 2023-06-02 RX ADMIN — Medication 2: at 17:20

## 2023-06-02 RX ADMIN — Medication 2: at 08:40

## 2023-06-02 RX ADMIN — Medication 650 MILLIGRAM(S): at 00:47

## 2023-06-02 RX ADMIN — ALBUTEROL 2 PUFF(S): 90 AEROSOL, METERED ORAL at 01:48

## 2023-06-02 RX ADMIN — Medication 1 APPLICATION(S): at 17:21

## 2023-06-02 RX ADMIN — Medication 650 MILLIGRAM(S): at 12:28

## 2023-06-02 RX ADMIN — Medication 2: at 12:26

## 2023-06-02 RX ADMIN — HEPARIN SODIUM 5000 UNIT(S): 5000 INJECTION INTRAVENOUS; SUBCUTANEOUS at 22:00

## 2023-06-02 RX ADMIN — HEPARIN SODIUM 5000 UNIT(S): 5000 INJECTION INTRAVENOUS; SUBCUTANEOUS at 16:01

## 2023-06-02 RX ADMIN — Medication 100 MILLIGRAM(S): at 16:00

## 2023-06-02 RX ADMIN — BUMETANIDE 2 MILLIGRAM(S): 0.25 INJECTION INTRAMUSCULAR; INTRAVENOUS at 15:58

## 2023-06-02 RX ADMIN — TAMSULOSIN HYDROCHLORIDE 0.4 MILLIGRAM(S): 0.4 CAPSULE ORAL at 22:00

## 2023-06-02 RX ADMIN — ATORVASTATIN CALCIUM 80 MILLIGRAM(S): 80 TABLET, FILM COATED ORAL at 22:00

## 2023-06-02 RX ADMIN — Medication 100 MILLIGRAM(S): at 05:42

## 2023-06-02 RX ADMIN — INSULIN GLARGINE 30 UNIT(S): 100 INJECTION, SOLUTION SUBCUTANEOUS at 22:01

## 2023-06-02 RX ADMIN — BUMETANIDE 2 MILLIGRAM(S): 0.25 INJECTION INTRAMUSCULAR; INTRAVENOUS at 05:42

## 2023-06-02 RX ADMIN — Medication 81 MILLIGRAM(S): at 12:26

## 2023-06-02 RX ADMIN — Medication 100 MILLIGRAM(S): at 18:02

## 2023-06-02 RX ADMIN — PANTOPRAZOLE SODIUM 40 MILLIGRAM(S): 20 TABLET, DELAYED RELEASE ORAL at 05:42

## 2023-06-02 RX ADMIN — CLOPIDOGREL BISULFATE 75 MILLIGRAM(S): 75 TABLET, FILM COATED ORAL at 12:26

## 2023-06-02 RX ADMIN — Medication 1 APPLICATION(S): at 05:43

## 2023-06-02 NOTE — PROGRESS NOTE ADULT - SUBJECTIVE AND OBJECTIVE BOX
Follow Up:  Strep dysgalactiae bacteremia    Interval History/ROS: sleeping soundly in chair  - other clinicians noted sob    Allergies  doxepin (Other)  Zosyn (Pruritus)  vancomycin (Rash (Mild to Mod))  ceftriaxone (Rash)      ANTIMICROBIALS:  ceFAZolin   IVPB 2000 every 8 hours      OTHER MEDS:  MEDICATIONS  (STANDING):  acetaminophen     Tablet .. 650 every 6 hours PRN  albuterol    90 MICROgram(s) HFA Inhaler 2 every 6 hours PRN  ALPRAZolam 0.25 daily PRN  aluminum hydroxide/magnesium hydroxide/simethicone Suspension 30 every 4 hours PRN  aspirin enteric coated 81 daily  atorvastatin 80 at bedtime  buMETAnide Injectable 2 two times a day  clopidogrel Tablet 75 daily  dextrose 50% Injectable 25 once  dextrose Oral Gel 15 once PRN  heparin   Injectable 5000 every 8 hours  insulin glargine Injectable (LANTUS) 30 at bedtime  insulin lispro (ADMELOG) corrective regimen sliding scale  three times a day before meals  insulin lispro (ADMELOG) corrective regimen sliding scale  at bedtime  melatonin 3 at bedtime PRN  metoprolol succinate  every 12 hours  ondansetron Injectable 4 every 8 hours PRN  pantoprazole    Tablet 40 before breakfast  tamsulosin 0.4 at bedtime      Vital Signs Last 24 Hrs  T(C): 36.4 (02 Jun 2023 15:42), Max: 36.9 (01 Jun 2023 19:25)  T(F): 97.5 (02 Jun 2023 15:42), Max: 98.4 (01 Jun 2023 19:25)  HR: 84 (02 Jun 2023 15:42) (79 - 92)  BP: 109/64 (02 Jun 2023 15:42) (89/64 - 109/64)  BP(mean): --  RR: 18 (02 Jun 2023 15:42) (18 - 18)  SpO2: 90% (02 Jun 2023 15:42) (90% - 95%)    Parameters below as of 02 Jun 2023 15:42  Patient On (Oxygen Delivery Method): room air        PHYSICAL EXAM:  General: NAD, Non-toxic  Neurology: Asleep in chair  Respiratory: no resp distress  Abdominal:  non-distended  Extremities: 2+  edema, dark erythematous discoloration rle  Line Sites: Clear  Skin: No rash                          11.7   8.71  )-----------( 152      ( 02 Jun 2023 07:15 )             38.5   WBC Count: 8.71 (06-02 @ 07:15)  WBC Count: 9.09 (06-01 @ 07:17)  WBC Count: 11.32 (05-30 @ 07:04)  WBC Count: 14.19 (05-29 @ 07:38)    06-02    131<L>  |  95<L>  |  92<H>  ----------------------------<  147<H>  3.4<L>   |  19<L>  |  2.37<H>  Creatinine: 2.37 (06-02 @ 07:15)    Creatinine: 2.73 (06-01 @ 07:19)    Creatinine: 3.01 (05-31 @ 07:16)    Creatinine: 2.99 (05-30 @ 07:01)    Creatinine: 2.55 (05-29 @ 07:38)    Ca    8.3<L>      02 Jun 2023 07:15  Mg     2.9     06-02      MICROBIOLOGY:  .Blood Blood  06-01-23   No growth to date.  --  --      .Blood Blood  05-31-23   No growth to date.  --  --      .Blood Blood  05-30-23   No growth to date.  --  --      Clean Catch Clean Catch (Midstream)  05-28-23   <10,000 CFU/mL Normal Urogenital Dorothy  --  --      .Blood Blood-Peripheral  05-28-23   Growth in aerobic bottle: Streptococcus dysgalactiae (Group C/G)  See previous culture [id number]  --    Growth in aerobic bottle: Gram Positive Cocci in Pairs and Chains      .Blood Blood-Peripheral  05-28-23   Growth in anaerobic bottle: Streptococcus dysgalactiae (Group C/G)    Rapid RVP Result: NotDetec (05-28 @ 08:22)    Clostridium difficile GDH Toxins A&amp;B, EIA:   Negative (05-28-23 @ 13:34)  Clostridium difficile GDH Interpretation: Negative for toxigenic C. Difficile.  This specimen is negative for C.  Difficile glutamate dehydrogenase (GDH) antigen and negative for C.  Difficile Toxins A & B, by EIA.  . (05-28-23 @ 13:34)      RADIOLOGY:  < from: Xray Chest 1 View AP/PA (05.28.23 @ 10:36) >  IMPRESSION: Small right pleural effusion and/or right basilar   atelectasis. Please correlate with the CT scan of the chest performed the   same day as this exam.    < end of copied text >  < from: CT Abdomen and Pelvis No Cont (05.28.23 @ 09:15) >  IMPRESSION:  Small right-sided pleural effusion with adjacent compressive atelectasis   increased when compared to prior exam from 3/1/2023.    Mild bladder wall thickening. Recommend correlation with urinalysis.    Unchanged intra-abdominal findings when compared to 3/2/2023 as described   above.    < end of copied text >      Sharath Morris MD; Division of Infectious Disease; Pager: [phone]; nights and weekends: [phone]

## 2023-06-02 NOTE — PROGRESS NOTE ADULT - ASSESSMENT
63 yo M PMHx of HFrEF EF 29%, ICM, ICD, IDDM2, COPD, LUIS ALFREDO, CKD3-4, p/w 1 day of  N/V/D. Pt reports feeling unwell for the past week, recently saw his wound specialist for a chronic Right foot wound and had the wound debrided. On day of presentation to the ED, pt c/o multiple episodes of nausea, nbnb emesis, and nonbloody diarrhea. In the ED, febrile 101.5, SBP 95 range, Meds received - ancef 2g, NS 500cc, tylenol 650 mg, 1 g IV, bumex 4 mg, pepcid 20 mg, kcl 40 meq.       1- MANJIT on CKDIV  2- bacteremia  3- Cellulitis   4- CHF  5- Chronic R [place]      MANJIT in setting of infection, as well as requiring diuretics for CHF  creatinine is improving.  add bumex 2mg iv bid, given he is sob  bacteremia, cefazolin 2G BID, monitor closely for drug reaction  ID following  chronic r hydro is unchanged, non-obstructing left renal calculus noted  metoprolol 100 mg BID, may need to decrease to allow for diuresis given BP is low  RLE doppler pending  strict I/O  trend creatinine and electrolytes daily

## 2023-06-02 NOTE — PROGRESS NOTE ADULT - ASSESSMENT
61 y/o male with PMH including HFrEF, ICM, ICD, IDDM2, COPD, LUIS ALFREDO, CKD3-4. obesity (BMI = 35.8) admitted 5/28/23  w/ 1 day of N/V/D, after debridement of chronic right foot wound    Strep dysgalactie bacteremia 5/28 +BC x2 - likley related to RLE infection  Bacteremia cleared   5/30 BC x1 NGTD; 5/31 BC x2 NGTD; 6/1 BC x1 NGTD    developed skin changes while on antibiotics March 2023 hospitalization raising concern about possible hypersensitivity  5/31: podiatry evaluation appreciated, Onychomycosis, Xerosis plantarly bilateral. Diabetes mellitus/neuropathy:   toe nails debrided  6/1: improved renal function -- chronic r hydro is unchanged, non-obstructing left renal calculus noted  6/2  creatinine continues to improve    Suggest  Increase dose Ceazolin (I ordered)  Continue Cefazolin 5/28 -->     10 day course of antibiotics anticipated  - consider change to po Keflex on 6/4 --> 6/7 if reamains stable    please call ID if needed over weekend

## 2023-06-02 NOTE — CONSULT NOTE ADULT - ASSESSMENT
61 yo M PMHx of HFrEF EF 29%, ICM, ICD, IDDM2, COPD, LUIS ALFREDO, CKD3-4, p/w 1 day of  N/V/D. Pt reports feeling unwell for the past week, recently saw his wound specialist for a chronic Right foot wound and had the wound debrided. On day of presentation to the ED, pt c/o multiple episodes of nausea, nbnb emesis, and nonbloody diarrhea. In the ED, febrile 101.5, SBP 95 range, Meds received - ancef 2g, NS 500cc, tylenol 650 mg, 1 g IV, bumex 4 mg, pepcid 20 mg, kcl 40 meq.       1- MANJIT on CKDIV  2- bacteremia  3- Cellulitis   4- CHF  5- Chronic R [place]      MANJIT in setting of infection, as well as requiring diuretics for CHF  appears hypervolemic on exam, however is breathing comfortably on RA.   hold diuretics for now, unless he becomes SOB  he is tolerating po intake, avoid IVF for now.  check stool culture  bacteremia, cefazolin 2G BID, monitor closely for drug reaction  ID consulted.  check urine protein/creatinine ratio  chronic r hydro is unchanged, non-obstructing left renal calculus noted  strict I/O  trend creatinine and electrolytes daily
 63 y/o male with PMH including HFrEF, ICM, ICD, IDDM2, COPD, LUIS ALFREDO, CKD3-4. obesity (BMI = 35.8)5/28/23  w/ 1 day of N/V/D, after debridement of chronic right foot wound    Strep dysgalactie bacteremia Ed gonzalez related to RLE infection  developed skin changes while on antibiotics March 2023 hospitalization raising concern about possible hypersensitivity      Suggest  Continue Cefazolin 5/28 -->  repeat blood cultures to document clearing of bacteremia  (ordered for am)  trend Creat  (if creat continues to rise will decrease frequency to q 12 hours)  podiatry evaluation of right foot  consider MRI RLE if aicd compatible - plain x-ray would be helpful    discussed at bedside earlier with primary medical attending  
 patient has a severe ischemic cardiomyopathy with an ejection fraction of 25% no change now with nausea vomiting decreased appetite and p.o. intake and worsening renal insufficiency which is now resolved.  He appears quite stable hemodynamically.  He has chronic illness but I think his present symptomatology was probably related to some GI issue/gastroenteritis.  His renal function has improved     recommend   renal follow-up   restart outpatient p.o. medication   early discharge.

## 2023-06-02 NOTE — PROGRESS NOTE ADULT - SUBJECTIVE AND OBJECTIVE BOX
[facility]   [phone]  RENAL FOLLOW UP NOTE  --------------------------------------------------------------------------------  Chief Complaint:    24 hour events/subjective:    patient seen and examined.   c/o sob, +orthopnea    PAST HISTORY  --------------------------------------------------------------------------------  No significant changes to PMH, PSH, FHx, SHx, unless otherwise noted    ALLERGIES & MEDICATIONS  --------------------------------------------------------------------------------  Allergies    doxepin (Other)  Zosyn (Pruritus)  vancomycin (Rash (Mild to Mod))  ceftriaxone (Rash)    Intolerances      Standing Inpatient Medications  ammonium lactate 12% Lotion 1 Application(s) Topical two times a day  aspirin enteric coated 81 milliGRAM(s) Oral daily  atorvastatin 80 milliGRAM(s) Oral at bedtime  buMETAnide Injectable 2 milliGRAM(s) IV Push two times a day  ceFAZolin   IVPB 2000 milliGRAM(s) IV Intermittent every 8 hours  clopidogrel Tablet 75 milliGRAM(s) Oral daily  dextrose 5%. 1000 milliLiter(s) IV Continuous <Continuous>  dextrose 50% Injectable 25 Gram(s) IV Push once  heparin   Injectable 5000 Unit(s) SubCutaneous every 8 hours  insulin glargine Injectable (LANTUS) 30 Unit(s) SubCutaneous at bedtime  insulin lispro (ADMELOG) corrective regimen sliding scale   SubCutaneous three times a day before meals  insulin lispro (ADMELOG) corrective regimen sliding scale   SubCutaneous at bedtime  metoprolol succinate  milliGRAM(s) Oral every 12 hours  pantoprazole    Tablet 40 milliGRAM(s) Oral before breakfast  tamsulosin 0.4 milliGRAM(s) Oral at bedtime    PRN Inpatient Medications  acetaminophen     Tablet .. 650 milliGRAM(s) Oral every 6 hours PRN  albuterol    90 MICROgram(s) HFA Inhaler 2 Puff(s) Inhalation every 6 hours PRN  ALPRAZolam 0.25 milliGRAM(s) Oral daily PRN  aluminum hydroxide/magnesium hydroxide/simethicone Suspension 30 milliLiter(s) Oral every 4 hours PRN  dextrose Oral Gel 15 Gram(s) Oral once PRN  melatonin 3 milliGRAM(s) Oral at bedtime PRN  ondansetron Injectable 4 milliGRAM(s) IV Push every 8 hours PRN      REVIEW OF SYSTEMS  --------------------------------------------------------------------------------    Gen: denies fevers/chills,  CVS: denies chest pain/palpitations  Resp: denies SOB/Cough  GI: Denies N/V/Abd pain  : Denies dysuria/oliguria/hematuria    VITALS/PHYSICAL EXAM  --------------------------------------------------------------------------------  T(C): 36.4 (06-02-23 @ 08:34), Max: 36.9 (06-01-23 @ 19:25)  HR: 80 (06-02-23 @ 08:34) (79 - 92)  BP: 100/72 (06-02-23 @ 08:34) (89/64 - 100/72)  RR: 18 (06-02-23 @ 08:34) (18 - 18)  SpO2: 95% (06-02-23 @ 08:34) (93% - 95%)  Wt(kg): --    Weight (kg): 122.7 (06-02-23 @ 05:50)      06-02-23 @ 07:01  -  06-02-23 @ 12:20  --------------------------------------------------------  IN: 240 mL / OUT: 0 mL / NET: 240 mL      Physical Exam:  	  	Gen: Non toxic comfortable appearing, on RA   	Pulm: decrease bs R>L, no rales or ronchi or wheezing  	CV: +JVD. RRR, S1S2; no rub  	Abd: +BS, soft, nontender/nondistended, obese  	: No suprapubic distention  	UE: Warm, no cyanosis  no clubbing,  no edema;   	LE: Warm, no cyanosis  no clubbing, RLE edema, erythema noted up to inner thigh. 2+edema    LABS/STUDIES  --------------------------------------------------------------------------------              11.7   8.71  >-----------<  152      [06-02-23 @ 07:15]              38.5     131  |  95  |  92  ----------------------------<  147      [06-02-23 @ 07:15]  3.4   |  19  |  2.37        Ca     8.3     [06-02-23 @ 07:15]      Mg     2.9     [06-02-23 @ 07:15]            Creatinine Trend:  SCr 2.37 [06-02 @ 07:15]  SCr 2.73 [06-01 @ 07:19]  SCr 3.01 [05-31 @ 07:16]  SCr 2.99 [05-30 @ 07:01]  SCr 2.55 [05-29 @ 07:38]              Urinalysis - [05-31-23 @ 07:20]      Color Yellow / Appearance Slightly Turbid / SG 1.016 / pH 6.0      Gluc Negative / Ketone Negative  / Bili Negative / Urobili Negative       Blood Small / Protein 100 mg/dl / Leuk Est Negative / Nitrite Negative      RBC 2 / WBC 2 / Hyaline 14 / Gran 4 / Sq Epi  / Non Sq Epi  / Bacteria Negative    Urine Creatinine 130      [05-31-23 @ 07:20]  Urine Protein 74      [05-31-23 @ 07:20]    Iron 14, TIBC --, %sat --      [03-02-23 @ 01:20]  Ferritin 225      [03-02-23 @ 01:18]  PTH -- (Ca 8.9)      [10-02-22 @ 07:04]   73  HbA1c 8.9      [12-16-19 @ 08:15]  TSH 3.02      [09-30-22 @ 13:45]

## 2023-06-02 NOTE — PROGRESS NOTE ADULT - SUBJECTIVE AND OBJECTIVE BOX
[facility]  Lul Cruz  Office: [phone]  Cell: [phone]  Can also be reached on [employer] Teams       INTERVAL HPI/OVERNIGHT EVENTS: renal function improving, BP borderline, No acute complaints    Vital Signs Last 24 Hrs  T(C): 36.4 (02 Jun 2023 08:34), Max: 36.9 (01 Jun 2023 11:13)  T(F): 97.5 (02 Jun 2023 08:34), Max: 98.4 (01 Jun 2023 11:13)  HR: 80 (02 Jun 2023 08:34) (79 - 92)  BP: 100/72 (02 Jun 2023 08:34) (89/64 - 100/72)  BP(mean): --  RR: 18 (02 Jun 2023 08:34) (18 - 19)  SpO2: 95% (02 Jun 2023 08:34) (93% - 96%)    Parameters below as of 02 Jun 2023 08:34  Patient On (Oxygen Delivery Method): room air        PHYSICAL EXAMINATION:  GENERAL: NAD, well built  HEAD:  Atraumatic, Normocephalic  EYES:  conjunctiva and sclera clear  NECK: Supple, No JVD, Normal thyroid  CHEST/LUNG: clear bilaterally, diminished, no obvious wheezing or crackles  HEART: Regular rate and rhythm  ABDOMEN: Soft, Nontender, enlarged, but softer than previous. Bowel sounds present  NERVOUS SYSTEM:  Alert & Oriented X4 to person place time and situation, no focal neurological deficits   EXTREMITIES:  2+ Peripheral Pulses, Increasing pitting edema especially on the right, with pitting edema extending to the distal thigh now approaching proximal to thigh, redness on bilateral legs, worse on the right extending just proximal to knees. Open wound plantar surface of right foot, healing  SKIN: warm dry                        11.7   8.71  )-----------( 152      ( 02 Jun 2023 07:15 )             38.5     06-02    131<L>  |  95<L>  |  92<H>  ----------------------------<  147<H>  3.4<L>   |  19<L>  |  2.37<H>    Ca    8.3<L>      02 Jun 2023 07:15  Mg     2.9     06-02                CAPILLARY BLOOD GLUCOSE      RADIOLOGY & ADDITIONAL TESTS:

## 2023-06-02 NOTE — PROGRESS NOTE ADULT - NS ATTEND AMEND GEN_ALL_CORE FT
Formulated plan with and  agree with above as scribed by NP Quin [Stevan]     ckd IV with MANJIT   CHF   strep bacteremia     cr stabilizing   bumex 2mg po bid as bp tolerates  borderline hypotension led to hold bumex this afternoon   trend bp   strict I/O  cont ancef as per id recommendations
Seen, examined with, formulated plan with and  agree with above as scribed by NP Quin [Stevan]       MANJIT on ckd   chf  bacteremia     hold diuretics today but given + crackles audible left lung field in am to start bumex   cr is still elevated and concerning   cont abx for strep bacteremia
Seen, examined with, formulated plan with and  agree with above as scribed by NP Quin [Stevan]     pt with resolving MANJIT   lungs with rales left with decrease bs right   2+ edema with erythema RLE         MANJIT resolving   chf start bumex 2 mg iv bid   borderline hypotension d.w Dr. Camara decrease metoprolol to 50 mg bid

## 2023-06-02 NOTE — CONSULT NOTE ADULT - SUBJECTIVE AND OBJECTIVE BOX
Rui Jessica  well-known to me with a severe ischemic cardiomyopathy, renal insufficiency and extensive coronary disease.  I was asked to evaluate his cardiac status.    HPI:  63 yo M PMHx of HFrEF EF 29%, ICM, ICD, IDDM2, COPD, LUIS ALFREDO, CKD3-4 p/w 1 day of  N/V/D. Hx also obtained from wife at bedside. Pt reports feeling unwell for the past week, recently saw his wound specialist for a chronic r foot wound and had the wound debrided. Since then, he has not felt well and with chills at home. On day of presentation to the ED, pt c/o multiple episodes of nausea, nbnb emesis, and nonbloody diarrhea. Denies any sick contacts at home or anyone with similar symptoms including children. Wife feels this presentation is identical to prior admissions for sepsis in the setting of leg cellulitis after wound debridement. Pt denies any recent trauma to foot, any cp, sob.    In the ED VS reviewed stable - temp up to 101.5F, , Desat to 89% on RA  Meds received - ancef 2g, NS 500cc, tylenol 650 mg, 1 g IV, bumex 4 mg, pepcid 20 mg, kcl 40 meq (28 May 2023 17:20)    Patient presently feels well without shortness of breath or chest pain.  His renal function has improved since admission.  His nausea vomiting and appetite have improved.  MEDICATIONS:  MEDICATIONS  (STANDING):  ammonium lactate 12% Lotion 1 Application(s) Topical two times a day  aspirin enteric coated 81 milliGRAM(s) Oral daily  atorvastatin 80 milliGRAM(s) Oral at bedtime  buMETAnide Injectable 2 milliGRAM(s) IV Push two times a day  ceFAZolin   IVPB 2000 milliGRAM(s) IV Intermittent every 8 hours  clopidogrel Tablet 75 milliGRAM(s) Oral daily  dextrose 5%. 1000 milliLiter(s) (50 mL/Hr) IV Continuous <Continuous>  dextrose 50% Injectable 25 Gram(s) IV Push once  heparin   Injectable 5000 Unit(s) SubCutaneous every 8 hours  insulin glargine Injectable (LANTUS) 30 Unit(s) SubCutaneous at bedtime  insulin lispro (ADMELOG) corrective regimen sliding scale   SubCutaneous three times a day before meals  insulin lispro (ADMELOG) corrective regimen sliding scale   SubCutaneous at bedtime  metoprolol succinate  milliGRAM(s) Oral every 12 hours  pantoprazole    Tablet 40 milliGRAM(s) Oral before breakfast  tamsulosin 0.4 milliGRAM(s) Oral at bedtime      PHYSICAL EXAM:  T(C): 36.4 (06-02-23 @ 15:42), Max: 36.9 (06-01-23 @ 19:25)  HR: 84 (06-02-23 @ 15:42) (79 - 88)  BP: 109/64 (06-02-23 @ 15:42) (91/62 - 109/64)  RR: 18 (06-02-23 @ 15:42) (18 - 18)  SpO2: 90% (06-02-23 @ 15:42) (90% - 95%)  Wt(kg): --  I&O's Summary    02 Jun 2023 07:01  -  02 Jun 2023 16:49  --------------------------------------------------------  IN: 480 mL / OUT: 300 mL / NET: 180 mL        Weight (kg): 122.7 (06-02 @ 05:50)    Appearance: Normal  No acute distress in good spirits and anxious to go home abdominal protuberance	  HEENT:   Normal oral mucosa, PERRL, EOMI	  Cardiovascular: Normal S1 S2, No JVD, No murmurs ,  Respiratory: Lungs  decreased breath sounds but no rales rhonchi or wheeze  Gastrointestinal:  Soft, Non-tender, + BS	  Skin: No rashes, No ecchymoses, No cyanosis, warm to touch  Musculoskeletal: Normal range of motion, normal strength  Psychiatry:  Mood & affect appropriate  Ext:  chronic brawny edema with trace pitting edema        LABS:    CARDIAC MARKERS:                                11.7   8.71  )-----------( 152      ( 02 Jun 2023 07:15 )             38.5     06-02    131<L>  |  95<L>  |  92<H>  ----------------------------<  147<H>  3.4<L>   |  19<L>  |  2.37<H>    Ca    8.3<L>      02 Jun 2023 07:15  Mg     2.9     06-02      proBNP:   Lipid Profile:   HgA1c:   TSH:           TELEMETRY: 	    ECG:  	< from: 12 Lead ECG (05.30.23 @ 20:13) >  Diagnosis Line NORMAL SINUS RHYTHM  POSSIBLE LEFT ATRIAL ENLARGEMENT  LEFT AXIS DEVIATION  ANTEROLATERAL INFARCT , AGE UNDETERMINED  ABNORMAL ECG  WHEN COMPARED WITH ECG OF 30-MAY-2023 20:12, (UNCONFIRMED)  NO SIGNIFICANT CHANGE WAS FOUND  Confirmed by MD JD, STEPHEN ([id number]) on 5/31/2023 10:42:46 AM    < from: TTE with Doppler (w/Cont) (03.02.23 @ 13:48) >  1. Normal left atrium.  LA volume index = 25 cc/m2.  2. Mild concentric left ventricular hypertrophy. Severe  global left ventricular systolic dysfunction. LVEF  calculated using biplane Bell's method is 25%.Severe  diastolic dysfunction (Stage III).  3. Severe right atrial enlargement. Right ventricular  enlargement with decreased right ventricular systolic  function.  4. Normal tricuspid valve. Mild tricuspid regurgitation.  Estimated pulmonary artery systolic pressure equals 42  mm  Hg, assuming right atrial pressure equals 15 mm Hg,  consistent with mild pulmonary pressures.  5. No pericardial effusion seen.  6. Bilateral pleural effusions.  *** Compared with echocardiogram of 12/13/2022, no  significant changes noted.  ------------------------------------------------------------------------  Confirmed on  3/2/2023 - 18:38:03 by Rocio Kelley M.D.  ------------------------------------------------------------------------          RADIOLOGY:   DIAGNOSTIC TESTING:  [ ] Echocardiogram:  [ ]  Catheterization:  [ ] Stress Test:    OTHER: 	          
[facility]  [phone]  NEPHROLOGY      INITIAL CONSULT NOTE  --------------------------------------------------------------------------------  HPI:    63 yo M PMHx of HFrEF EF 29%, ICM, ICD, IDDM2, COPD, LUIS ALFREDO, CKD3-4, p/w 1 day of  N/V/D. Pt reports feeling unwell for the past week, recently saw his wound specialist for a chronic Right foot wound and had the wound debrided. On day of presentation to the ED, pt c/o multiple episodes of nausea, nbnb emesis, and nonbloody diarrhea. In the ED, febrile 101.5, SBP 95 range, Meds received - ancef 2g, NS 500cc, tylenol 650 mg, 1 g IV, bumex 4 mg, pepcid 20 mg, kcl 40 meq. Now with rising creatinine, renal consult called.     PAST HISTORY  --------------------------------------------------------------------------------  PAST MEDICAL & SURGICAL HISTORY:  Stented coronary artery      Diabetes      AICD (automatic cardioverter/defibrillator) present      Hypertension      Heart failure with reduced ejection fraction      History of ischemic cardiomyopathy      History of COPD      H/O gastroesophageal reflux (GERD)      2019 novel coronavirus disease (COVID-19)      COVID-19 vaccine series completed      H/O vasectomy  20 yrs ago (2000)        FAMILY HISTORY:  Family history of COPD (chronic obstructive pulmonary disease) (Sibling)    Family history of cardiac disorder  Paternal      PAST SOCIAL HISTORY:  Former smoker      ALLERGIES & MEDICATIONS  --------------------------------------------------------------------------------  Allergies    doxepin (Other)  Zosyn (Pruritus)  vancomycin (Rash (Mild to Mod))  ceftriaxone (Rash)    Intolerances      Standing Inpatient Medications  ammonium lactate 12% Lotion 1 Application(s) Topical two times a day  aspirin enteric coated 81 milliGRAM(s) Oral daily  atorvastatin 80 milliGRAM(s) Oral at bedtime  budesonide 160 MICROgram(s)/formoterol 4.5 MICROgram(s) Inhaler 2 Puff(s) Inhalation two times a day  ceFAZolin   IVPB 2000 milliGRAM(s) IV Intermittent every 12 hours  clopidogrel Tablet 75 milliGRAM(s) Oral daily  dextrose 5%. 1000 milliLiter(s) IV Continuous <Continuous>  dextrose 50% Injectable 25 Gram(s) IV Push once  heparin   Injectable 5000 Unit(s) SubCutaneous every 8 hours  insulin glargine Injectable (LANTUS) 25 Unit(s) SubCutaneous at bedtime  insulin lispro (ADMELOG) corrective regimen sliding scale   SubCutaneous three times a day before meals  metoprolol succinate  milliGRAM(s) Oral every 12 hours  pantoprazole    Tablet 40 milliGRAM(s) Oral before breakfast  tamsulosin 0.4 milliGRAM(s) Oral at bedtime    PRN Inpatient Medications  acetaminophen     Tablet .. 650 milliGRAM(s) Oral every 6 hours PRN  albuterol    90 MICROgram(s) HFA Inhaler 2 Puff(s) Inhalation every 6 hours PRN  ALPRAZolam 0.25 milliGRAM(s) Oral daily PRN  aluminum hydroxide/magnesium hydroxide/simethicone Suspension 30 milliLiter(s) Oral every 4 hours PRN  dextrose Oral Gel 15 Gram(s) Oral once PRN  melatonin 3 milliGRAM(s) Oral at bedtime PRN  ondansetron Injectable 4 milliGRAM(s) IV Push every 8 hours PRN      REVIEW OF SYSTEMS  --------------------------------------------------------------------------------  Gen: +fevers on admission   Skin: no rash  Head/Eyes/Ears/Mouth: No headache; Normal hearing;    Respiratory: No dyspnea, cough, wheezing,   CV: No chest pain  GI: No abdominal pain, +diarrhea, +nausea, +vomiting, (N/V now resolved)  : No dysuria, decrease urination   MSK: No joint pain/swelling; no back pain  Neuro: No dizziness/lightheadedness, weakness,  also with edema     VITALS/PHYSICAL EXAM  --------------------------------------------------------------------------------  T(C): 36.7 (05-30-23 @ 11:50), Max: 37.3 (05-29-23 @ 19:18)  HR: 96 (05-30-23 @ 11:50) (95 - 103)  BP: 90/61 (05-30-23 @ 11:50) (86/62 - 103/63)  RR: 18 (05-30-23 @ 11:50) (18 - 18)  SpO2: 94% (05-30-23 @ 11:50) (93% - 95%)  Wt(kg): --        05-29-23 @ 07:01  -  05-30-23 @ 07:00  --------------------------------------------------------  IN: 740 mL / OUT: 700 mL / NET: 40 mL    05-30-23 @ 07:01  -  05-30-23 @ 13:52  --------------------------------------------------------  IN: 400 mL / OUT: 200 mL / NET: 200 mL      Physical Exam:  	Gen: Non toxic comfortable appearing, on RA   	Pulm: decrease bs R>L, no rales or ronchi or wheezing  	CV: +JVD. RRR, S1S2; no rub  	Back: No CVA tenderness; no sacral edema  	Abd: +BS, soft, nontender/nondistended, obese  	: No suprapubic distention  	UE: Warm, no cyanosis  no clubbing,  no edema;   	LE: Warm, no cyanosis  no clubbing, RLE edema, erythema noted up to inner thigh.  	Neuro: alert and oriented. speech coherent   	Skin: Warm, no decrease skin turgor, no rash noted    LABS/STUDIES  --------------------------------------------------------------------------------              11.1   11.32 >-----------<  133      [05-30-23 @ 07:04]              37.5     133  |  95  |  69  ----------------------------<  162      [05-30-23 @ 07:01]  3.5   |  19  |  2.99        Ca     8.8     [05-30-23 @ 07:01]      Mg     2.3     [05-30-23 @ 07:01]    TPro  6.9  /  Alb  3.3  /  TBili  1.0  /  DBili  x   /  AST  25  /  ALT  21  /  AlkPhos  161  [05-29-23 @ 07:38]          Creatinine Trend:  SCr 2.99 [05-30 @ 07:01]  SCr 2.55 [05-29 @ 07:38]  SCr 2.39 [05-28 @ 08:23]    Urinalysis - [05-28-23 @ 09:31]      Color Yellow / Appearance Clear / SG 1.016 / pH 5.5      Gluc 100 mg/dL / Ketone Negative  / Bili Negative / Urobili Negative       Blood Negative / Protein 30 mg/dL / Leuk Est Negative / Nitrite Negative      RBC 2 / WBC 1 / Hyaline 5 / Gran  / Sq Epi  / Non Sq Epi  / Bacteria Negative      Iron 14, TIBC --, %sat --      [03-02-23 @ 01:20]  Ferritin 225      [03-02-23 @ 01:18]  PTH -- (Ca 8.9)      [10-02-22 @ 07:04]   73  HbA1c 8.9      [12-16-19 @ 08:15]  TSH 3.02      [09-30-22 @ 13:45]    HCV 0.09, Nonreact      [12-16-19 @ 08:36]    < from: CT Abdomen and Pelvis No Cont (05.28.23 @ 09:15) >  ACC: [id number] EXAM:  CT ABDOMEN AND PELVIS   ORDERED BY: AMANDA CLARK     ACC: [id number] EXAM:  CT CHEST   ORDERED BY: AMANDA CLARK     PROCEDURE DATE:  05/28/2023          INTERPRETATION:  CLINICAL INFORMATION: Nausea vomiting diarrhea    COMPARISON: CT chest 3/1/2023, CT abdomen pelvis 3/2/2023.    CONTRAST/COMPLICATIONS:  IV Contrast: NONE  Oral Contrast: NONE  Complications: None reported at time of study completion    PROCEDURE:  CT of the Chest, Abdomen and Pelvis was performed.  Sagittal and coronal reformats were performed.    FINDINGS:  Full examination of the intra-abdominal viscera and vasculature is   limited without the addition of IV contrast.    CHEST:  LUNGS, PLEURA, AND LARGE AIRWAYS: Patent central airways. Emphysematous   changes. Multiple bilateral calcified granulomas similar when compared to   prior exam. Small right-sided pleural effusion with adjacent compressive   atelectasis.  VESSELS: Coronary artery and aortic calcifications. Coronary stents in   place.  HEART: Cardiomegaly. No pericardial effusion.  MEDIASTINUM AND BOB: Largely unchanged mediastinal lymphadenopathy. For   reference a prevascular lymph node measures 1.6 x 1.2 cm previously 1.7 x   1.1 cm (2, 30).  CHEST WALL AND LOWER NECK: Left chest wall AICD.    ABDOMEN AND PELVIS:  LIVER: Hepatomegaly with steatosis.  BILE DUCTS: Normal caliber.  GALLBLADDER: Within normal limits.  SPLEEN: Splenomegaly.  PANCREAS: Within normal limits.  ADRENALS: Within normal limits.  KIDNEYS/URETERS: Similar-appearing severe chronic right-sided hydroureter   with renal cortical thinning. Punctate nonobstructing left renal   stones.No obstructing renal calculi bilaterally. No left-sided   hydronephrosis.    BLADDER: Mild bladder wall thickening. Previously visualized bladder   diverticula not well appreciated on this scan.  REPRODUCTIVE ORGANS: Prostate is enlarged.    BOWEL: Colonic diverticulosis without diverticulitis. No bowel   obstruction. Status post appendectomy. No colonic wall thickeningto   suggest a colitis.  PERITONEUM: No ascites.  VESSELS: Atherosclerotic changes.  RETROPERITONEUM/LYMPH NODES: 1.5 cm para-aortic lymph node unchanged from   prior exam with additional scattered subcentimeter mediastinal lymph   nodes.  ABDOMINAL WALL: Within normal limits.  BONES: Degenerative changes.    IMPRESSION:  Small right-sided pleural effusion with adjacent compressive atelectasis   increased when compared to prior exam from 3/1/2023.    Mild bladder wall thickening. Recommend correlation with urinalysis.    Unchanged intra-abdominal findings when compared to 3/2/2023 as described   above.        --- End of Report ---    < end of copied text >  
Patient is a 62y old  Male who presents with a chief complaint of Sepsis    Per chart, patient is a 61 y/o male with PMH including HFrEF, ICM, ICD, IDDM2, COPD, LUIS ALFREDO, CKD3-4. obesity (BMI = 35.8)5/28/23  w/ 1 day of N/V/D, feeling unwell for the past week. Patient reporting recently seeing his wound specialist for chronic R foot wound and had the wound debrided, since then, he has not felt well and w/ chills at home. Upon admission patient c/o multiple episodes of nausea, NBNB emesis, nonbloody diarrhea per MD. Patient w/ severe sepsis due to suspected cellulitis per MD, current receiving antibiotic regimen. (30 May 2023 12:24)    HPI:  63 yo M PMHx of HFrEF EF 29%, ICM, ICD, IDDM2, COPD, LUIS ALFREDO, CKD3-4 p/w 1 day of  N/V/D. Hx also obtained from wife at bedside. Pt reports feeling unwell for the past week, recently saw his wound specialist for a chronic r foot wound and had the wound debrided. Since then, he has not felt well and with chills at home. On day of presentation to the ED, pt c/o multiple episodes of nausea, nbnb emesis, and nonbloody diarrhea. Denies any sick contacts at home or anyone with similar symptoms including children. Wife feels this presentation is identical to prior admissions for sepsis in the setting of leg cellulitis after wound debridement. Pt denies any recent trauma to foot, any cp, sob.    In the ED VS reviewed stable - temp up to 101.5F, , Desat to 89% on RA  Meds received - ancef 2g, NS 500cc, tylenol 650 mg, 1 g IV, bumex 4 mg, pepcid 20 mg, kcl 40 meq (28 May 2023 17:20)    Previously hospitalized 3/1 --> 3/11/23  septic shock, MICU  LE cellulitis  blluous cellulitis  cultures were negative   Cefepime --> Ceftriaxone  rash  changed to Vanco/Garrick --> Cefazolin  --> keflex  unclear allergy        PAST MEDICAL & SURGICAL HISTORY:  Stented coronary artery  Diabetes    AICD (automatic cardioverter/defibrillator) present    Hypertension    Heart failure with reduced ejection fraction    History of ischemic cardiomyopathy    History of COPD    H/O gastroesophageal reflux (GERD)    2019 novel coronavirus disease (COVID-19)    COVID-19 vaccine series completed    H/O vasectomy  20 yrs ago (2000)      Social history: former smoker, no etoh    FAMILY HISTORY:  Family history of COPD (chronic obstructive pulmonary disease) (Sibling)    Family history of cardiac disorder  Paternal      REVIEW OF SYSTEMS:  CONSTITUTIONAL: No weakness, fevers or chills  EYES/ENT: No visual changes;  No vertigo or throat pain   NECK: No pain or stiffness  RESPIRATORY: No cough, wheezing, hemoptysis; No shortness of breath  CARDIOVASCULAR: No chest pain or palpitations  GASTROINTESTINAL: No abdominal or epigastric pain. No nausea, vomiting, or hematemesis; No diarrhea or constipation. No melena or hematochezia.  GENITOURINARY: No dysuria, frequency or hematuria  NEUROLOGICAL: No numbness or weakness  SKIN: No itching, burning, rashes, or lesions   All other review of systems is negative unless indicated above    Allergies  doxepin (Other)  Zosyn (Pruritus)  vancomycin (Rash (Mild to Mod))  ceftriaxone (Rash)    Intolerances      Antimicrobials:  ceFAZolin   IVPB 2000 milliGRAM(s) IV Intermittent every 12 hours      Vital Signs Last 24 Hrs  T(C): 36.7 (30 May 2023 11:50), Max: 37.3 (29 May 2023 19:18)  T(F): 98.1 (30 May 2023 11:50), Max: 99.1 (29 May 2023 19:18)  HR: 96 (30 May 2023 11:50) (95 - 103)  BP: 90/61 (30 May 2023 11:50) (86/62 - 103/63)  BP(mean): --  RR: 18 (30 May 2023 11:50) (18 - 18)  SpO2: 94% (30 May 2023 11:50) (93% - 95%)    Parameters below as of 30 May 2023 11:50  Patient On (Oxygen Delivery Method): room air        PHYSICAL EXAM:  General: WN/WD NAD, Non-toxic  Neurology: A&Ox3, nonfocal  Respiratory: Clear to auscultation bilaterally  CV: RRR, S1S2, no murmurs, rubs or gallops  Abdominal: Soft, Non-tender, non-distended, normal bowel sounds  Extremities: rle edema  dull darkish discoloration consistent with partially treated cellulitis  Line Sites: Clear  Skin: No rash                          11.1   11.32 )-----------( 133      ( 30 May 2023 07:04 )             37.5   WBC Count: 11.32 (05-30 @ 07:04)  WBC Count: 14.19 (05-29 @ 07:38)  WBC Count: 18.61 (05-28 @ 08:23)          05-30    133<L>  |  95<L>  |  69<H>  ----------------------------<  162<H>  3.5   |  19<L>  |  2.99<H>  Creatinine: 2.99 (05-30 @ 07:01)    Creatinine: 2.55 (05-29 @ 07:38)    Creatinine: 2.39 (05-28 @ 08:23)        Ca    8.8      30 May 2023 07:01  Mg     2.3     05-30    TPro  6.9  /  Alb  3.3  /  TBili  1.0  /  DBili  x   /  AST  25  /  ALT  21  /  AlkPhos  161<H>  05-29     (05.28.23 @ 09:31)   Squamous Epithelial Cells: 0 /hpf  Red Blood Cell - Urine: 2 /hpf  White Blood Cell - Urine: 1 /HPF  Hyaline Casts: 5 /lpf  Bacteria: NegativeUrinalysis (05.28.23 @ 09:31)   Blood, Urine: Negative  Glucose Qualitative, Urine: 100 mg/dL  pH Urine: 5.5  Color: Yellow  Urine Appearance: Clear  Bilirubin: Negative  Ketone - Urine: Negative  Specific Gravity: 1.016  Protein, Urine: 30 mg/dL  Urobilinogen: Negative  Nitrite: Negative  Leukocyte Esterase Concentration: Negative    MICROBIOLOGY:  Clean Catch Clean Catch (Midstream)  05-28-23   <10,000 CFU/mL Normal Urogenital Dorothy  --  --      .Blood Blood-Peripheral  05-28-23   Growth in aerobic bottle: Streptococcus dysgalactiae (Group C/G)  See previous culture [id number]  --    Growth in aerobic bottle: Gram Positive Cocci in Pairs and Chains      .Blood Blood-Peripheral  05-28-23   Growth in anaerobic bottle: Streptococcus dysgalactiae (Group C/G)  cCulture - Blood (05.28.23 @ 07:20)   - Streptococcus sp. (Not Grp A, B or S pneumoniae): Detec  Gram Stain:   Growth in anaerobic bottle: Gram Positive Cocci in Pairs and Chains  - Ceftriaxone: S <=0.25  - Clindamycin: S <=0.06  - Levofloxacin: S 0.5  - Penicillin: S <=0.03 Predicts results for ampicillin, amoxicillin, amoxicillin/clavulanate, ampicillin/sulbactam, 1st, 2nd and 3rd generation cephalosporins and carbapenems.  - Vancomycin: S 0.5    Radiology:  < from: Xray Chest 1 View AP/PA (05.28.23 @ 10:36) >  IMPRESSION: Small right pleural effusion and/or right basilar   atelectasis. Please correlate with the CT scan of the chest performed the   same day as this exam.    < end of copied text >  < from: CT Abdomen and Pelvis No Cont (05.28.23 @ 09:15) >  FINDINGS:  Full examination of the intra-abdominal viscera and vasculature is   limited without the addition of IV contrast.    CHEST:  LUNGS, PLEURA, AND LARGE AIRWAYS: Patent central airways. Emphysematous   changes. Multiple bilateral calcified granulomas similar when compared to   prior exam. Small right-sided pleural effusion with adjacent compressive   atelectasis.  VESSELS: Coronary artery and aortic calcifications. Coronary stents in   place.  HEART: Cardiomegaly. No pericardial effusion.  MEDIASTINUM AND BOB: Largely unchanged mediastinal lymphadenopathy. For   reference a prevascular lymph node measures 1.6 x 1.2 cm previously 1.7 x   1.1 cm (2, 30).  CHEST WALL AND LOWER NECK: Left chest wall AICD.    ABDOMEN AND PELVIS:  LIVER: Hepatomegaly with steatosis.  BILE DUCTS: Normal caliber.  GALLBLADDER: Within normal limits.  SPLEEN: Splenomegaly.  PANCREAS: Within normal limits.  ADRENALS: Within normal limits.  KIDNEYS/URETERS: Similar-appearing severe chronic right-sided hydroureter   with renal cortical thinning. Punctate nonobstructing left renal   stones.No obstructing renal calculi bilaterally. No left-sided   hydronephrosis.    BLADDER: Mild bladder wall thickening. Previously visualized bladder   diverticula not well appreciated on this scan.  REPRODUCTIVE ORGANS: Prostate is enlarged.    BOWEL: Colonic diverticulosis without diverticulitis. No bowel   obstruction. Status post appendectomy. No colonic wall thickeningto   suggest a colitis.  PERITONEUM: No ascites.  VESSELS: Atherosclerotic changes.  RETROPERITONEUM/LYMPH NODES: 1.5 cm para-aortic lymph node unchanged from   prior exam with additional scattered subcentimeter mediastinal lymph   nodes.  ABDOMINAL WALL: Within normal limits.  BONES: Degenerative changes.    IMPRESSION:  Small right-sided pleural effusion with adjacent compressive atelectasis   increased when compared to prior exam from 3/1/2023.    Mild bladder wall thickening. Recommend correlation with urinalysis.    Unchanged intra-abdominal findings when compared to 3/2/2023 as described   above.    < end of copied text >          Sharath Morris MD; Division of Infectious Disease; Pager: [phone]; nights and weekends: [phone]

## 2023-06-03 ENCOUNTER — TRANSCRIPTION ENCOUNTER (OUTPATIENT)
Age: 63
End: 2023-06-03

## 2023-06-03 VITALS
TEMPERATURE: 98 F | RESPIRATION RATE: 18 BRPM | SYSTOLIC BLOOD PRESSURE: 103 MMHG | HEART RATE: 76 BPM | DIASTOLIC BLOOD PRESSURE: 70 MMHG | OXYGEN SATURATION: 95 %

## 2023-06-03 LAB
ANION GAP SERPL CALC-SCNC: 16 MMOL/L — SIGNIFICANT CHANGE UP (ref 5–17)
BUN SERPL-MCNC: 72 MG/DL — HIGH (ref 7–23)
CALCIUM SERPL-MCNC: 8.6 MG/DL — SIGNIFICANT CHANGE UP (ref 8.4–10.5)
CHLORIDE SERPL-SCNC: 98 MMOL/L — SIGNIFICANT CHANGE UP (ref 96–108)
CO2 SERPL-SCNC: 22 MMOL/L — SIGNIFICANT CHANGE UP (ref 22–31)
CREAT SERPL-MCNC: 1.75 MG/DL — HIGH (ref 0.5–1.3)
EGFR: 44 ML/MIN/1.73M2 — LOW
GLUCOSE BLDC GLUCOMTR-MCNC: 181 MG/DL — HIGH (ref 70–99)
GLUCOSE BLDC GLUCOMTR-MCNC: 215 MG/DL — HIGH (ref 70–99)
GLUCOSE BLDC GLUCOMTR-MCNC: 247 MG/DL — HIGH (ref 70–99)
GLUCOSE SERPL-MCNC: 172 MG/DL — HIGH (ref 70–99)
MAGNESIUM SERPL-MCNC: 2.6 MG/DL — SIGNIFICANT CHANGE UP (ref 1.6–2.6)
PHOSPHATE SERPL-MCNC: 2.5 MG/DL — SIGNIFICANT CHANGE UP (ref 2.5–4.5)
POTASSIUM SERPL-MCNC: 3.4 MMOL/L — LOW (ref 3.5–5.3)
POTASSIUM SERPL-SCNC: 3.4 MMOL/L — LOW (ref 3.5–5.3)
SODIUM SERPL-SCNC: 136 MMOL/L — SIGNIFICANT CHANGE UP (ref 135–145)

## 2023-06-03 PROCEDURE — 87150 DNA/RNA AMPLIFIED PROBE: CPT

## 2023-06-03 PROCEDURE — 82330 ASSAY OF CALCIUM: CPT

## 2023-06-03 PROCEDURE — 82962 GLUCOSE BLOOD TEST: CPT

## 2023-06-03 PROCEDURE — 83735 ASSAY OF MAGNESIUM: CPT

## 2023-06-03 PROCEDURE — 96375 TX/PRO/DX INJ NEW DRUG ADDON: CPT

## 2023-06-03 PROCEDURE — 83036 HEMOGLOBIN GLYCOSYLATED A1C: CPT

## 2023-06-03 PROCEDURE — 0225U NFCT DS DNA&RNA 21 SARSCOV2: CPT

## 2023-06-03 PROCEDURE — 80048 BASIC METABOLIC PNL TOTAL CA: CPT

## 2023-06-03 PROCEDURE — 87449 NOS EACH ORGANISM AG IA: CPT

## 2023-06-03 PROCEDURE — 85025 COMPLETE CBC W/AUTO DIFF WBC: CPT

## 2023-06-03 PROCEDURE — 87186 SC STD MICRODIL/AGAR DIL: CPT

## 2023-06-03 PROCEDURE — 82947 ASSAY GLUCOSE BLOOD QUANT: CPT

## 2023-06-03 PROCEDURE — 82570 ASSAY OF URINE CREATININE: CPT

## 2023-06-03 PROCEDURE — 84132 ASSAY OF SERUM POTASSIUM: CPT

## 2023-06-03 PROCEDURE — 87040 BLOOD CULTURE FOR BACTERIA: CPT

## 2023-06-03 PROCEDURE — 71250 CT THORAX DX C-: CPT | Mod: MA

## 2023-06-03 PROCEDURE — 82435 ASSAY OF BLOOD CHLORIDE: CPT

## 2023-06-03 PROCEDURE — 83880 ASSAY OF NATRIURETIC PEPTIDE: CPT

## 2023-06-03 PROCEDURE — 84295 ASSAY OF SERUM SODIUM: CPT

## 2023-06-03 PROCEDURE — 71045 X-RAY EXAM CHEST 1 VIEW: CPT

## 2023-06-03 PROCEDURE — 93005 ELECTROCARDIOGRAM TRACING: CPT

## 2023-06-03 PROCEDURE — 80053 COMPREHEN METABOLIC PANEL: CPT

## 2023-06-03 PROCEDURE — 83605 ASSAY OF LACTIC ACID: CPT

## 2023-06-03 PROCEDURE — 87077 CULTURE AEROBIC IDENTIFY: CPT

## 2023-06-03 PROCEDURE — 84100 ASSAY OF PHOSPHORUS: CPT

## 2023-06-03 PROCEDURE — 94640 AIRWAY INHALATION TREATMENT: CPT

## 2023-06-03 PROCEDURE — 84484 ASSAY OF TROPONIN QUANT: CPT

## 2023-06-03 PROCEDURE — 84156 ASSAY OF PROTEIN URINE: CPT

## 2023-06-03 PROCEDURE — 82803 BLOOD GASES ANY COMBINATION: CPT

## 2023-06-03 PROCEDURE — 74176 CT ABD & PELVIS W/O CONTRAST: CPT | Mod: MA

## 2023-06-03 PROCEDURE — 93971 EXTREMITY STUDY: CPT

## 2023-06-03 PROCEDURE — 83690 ASSAY OF LIPASE: CPT

## 2023-06-03 PROCEDURE — 85027 COMPLETE CBC AUTOMATED: CPT

## 2023-06-03 PROCEDURE — 85018 HEMOGLOBIN: CPT

## 2023-06-03 PROCEDURE — 87086 URINE CULTURE/COLONY COUNT: CPT

## 2023-06-03 PROCEDURE — 99285 EMERGENCY DEPT VISIT HI MDM: CPT | Mod: 25

## 2023-06-03 PROCEDURE — 36415 COLL VENOUS BLD VENIPUNCTURE: CPT

## 2023-06-03 PROCEDURE — 96374 THER/PROPH/DIAG INJ IV PUSH: CPT

## 2023-06-03 PROCEDURE — 85014 HEMATOCRIT: CPT

## 2023-06-03 PROCEDURE — 81001 URINALYSIS AUTO W/SCOPE: CPT

## 2023-06-03 PROCEDURE — 87324 CLOSTRIDIUM AG IA: CPT

## 2023-06-03 RX ORDER — CEPHALEXIN 500 MG
1 CAPSULE ORAL
Qty: 20 | Refills: 0
Start: 2023-06-03 | End: 2023-06-07

## 2023-06-03 RX ORDER — SOD,AMMONIUM,POTASSIUM LACTATE
1 CREAM (GRAM) TOPICAL
Qty: 1 | Refills: 0
Start: 2023-06-03

## 2023-06-03 RX ADMIN — Medication 100 MILLIGRAM(S): at 13:23

## 2023-06-03 RX ADMIN — Medication 1 APPLICATION(S): at 17:30

## 2023-06-03 RX ADMIN — HEPARIN SODIUM 5000 UNIT(S): 5000 INJECTION INTRAVENOUS; SUBCUTANEOUS at 05:42

## 2023-06-03 RX ADMIN — Medication 100 MILLIGRAM(S): at 05:43

## 2023-06-03 RX ADMIN — Medication 2: at 08:54

## 2023-06-03 RX ADMIN — ALBUTEROL 2 PUFF(S): 90 AEROSOL, METERED ORAL at 02:11

## 2023-06-03 RX ADMIN — Medication 4: at 17:29

## 2023-06-03 RX ADMIN — BUMETANIDE 2 MILLIGRAM(S): 0.25 INJECTION INTRAMUSCULAR; INTRAVENOUS at 13:23

## 2023-06-03 RX ADMIN — BUMETANIDE 2 MILLIGRAM(S): 0.25 INJECTION INTRAMUSCULAR; INTRAVENOUS at 05:42

## 2023-06-03 RX ADMIN — Medication 81 MILLIGRAM(S): at 11:50

## 2023-06-03 RX ADMIN — PANTOPRAZOLE SODIUM 40 MILLIGRAM(S): 20 TABLET, DELAYED RELEASE ORAL at 05:43

## 2023-06-03 RX ADMIN — CLOPIDOGREL BISULFATE 75 MILLIGRAM(S): 75 TABLET, FILM COATED ORAL at 11:50

## 2023-06-03 RX ADMIN — Medication 50 MILLIGRAM(S): at 05:42

## 2023-06-03 RX ADMIN — HEPARIN SODIUM 5000 UNIT(S): 5000 INJECTION INTRAVENOUS; SUBCUTANEOUS at 13:23

## 2023-06-03 RX ADMIN — Medication 50 MILLIGRAM(S): at 17:30

## 2023-06-03 RX ADMIN — Medication 4: at 13:22

## 2023-06-03 NOTE — PROGRESS NOTE ADULT - ASSESSMENT
61 yo M PMHx of HFrEF EF 29%, ICM, ICD, IDDM2, COPD, LUIS ALFREDO, CKD3-4, p/w 1 day of  N/V/D. Pt reports feeling unwell for the past week, recently saw his wound specialist for a chronic Right foot wound and had the wound debrided. On day of presentation to the ED, pt c/o multiple episodes of nausea, nbnb emesis, and nonbloody diarrhea. In the ED, febrile 101.5, SBP 95 range, Meds received - ancef 2g, NS 500cc, tylenol 650 mg, 1 g IV, bumex 4 mg, pepcid 20 mg, kcl 40 meq.       1- MANJIT on CKDIV  2- bacteremia  3- Cellulitis   4- CHF  5- Chronic R Monson      MANJIT in setting of infection, as well as requiring diuretics for CHF now cr returning to baseline   change bumex to 2mg po bid as of am   bacteremia, cefazolin 2G BID, monitor closely for drug reaction  ID following  chronic r hydro is unchanged, non-obstructing left renal calculus noted  metoprolol succinate   to 50 mg po  bid af ter d/w cards this week   supplement kcl ofr hypokalemia   strict I/O  trend creatinine and electrolytes daily

## 2023-06-03 NOTE — PROGRESS NOTE ADULT - SUBJECTIVE AND OBJECTIVE BOX
[facility]  Lul Cruz  Office: [phone]  Cell: [phone]  Can also be reached on [employer] Teams     INTERVAL HPI/OVERNIGHT EVENTS: Doing well, grumpy, he wants to go home. No difficult breathing no chest pain. Right leg remains edematous.      Vital Signs Last 24 Hrs  T(C): 36.5 (03 Jun 2023 12:08), Max: 36.6 (02 Jun 2023 19:37)  T(F): 97.7 (03 Jun 2023 12:08), Max: 97.9 (02 Jun 2023 19:37)  HR: 76 (03 Jun 2023 12:08) (76 - 87)  BP: 103/70 (03 Jun 2023 12:08) (100/63 - 109/64)  BP(mean): --  RR: 18 (03 Jun 2023 12:08) (18 - 18)  SpO2: 95% (03 Jun 2023 12:08) (90% - 95%)    Parameters below as of 03 Jun 2023 12:08  Patient On (Oxygen Delivery Method): room air        PHYSICAL EXAMINATION:  GENERAL: NAD, well built  HEAD:  Atraumatic, Normocephalic  EYES:  conjunctiva and sclera clear  NECK: Supple, No JVD, Normal thyroid  CHEST/LUNG: clear bilaterally, diminished, no obvious wheezing or crackles  HEART: Regular rate and rhythm  ABDOMEN: Soft, Nontender, enlarged, but softer than previous. Bowel sounds present  NERVOUS SYSTEM:  Alert & Oriented X4 to person place time and situation, no focal neurological deficits   EXTREMITIES:  2+ Peripheral Pulses, Increasing pitting edema especially on the right, with pitting edema extending to the distal thigh now approaching proximal to thigh, redness on bilateral legs, worse on the right extending just proximal to knees. Open wound plantar surface of right foot, healing  SKIN: warm dry                        11.7   8.71  )-----------( 152      ( 02 Jun 2023 07:15 )             38.5     06-03    136  |  98  |  72<H>  ----------------------------<  172<H>  3.4<L>   |  22  |  1.75<H>    Ca    8.6      03 Jun 2023 07:22  Phos  2.5     06-03  Mg     2.6     06-03                CAPILLARY BLOOD GLUCOSE      RADIOLOGY & ADDITIONAL TESTS:

## 2023-06-03 NOTE — PROGRESS NOTE ADULT - PROBLEM SELECTOR PLAN 6
Last EF still ~30% with AICD,   At home on metoprolol, entresto, Epleronone, bumex, sglt2  - holding entresto epleronone and sglt2 in the setting of hypotension- will reintroduce gradually with resuming bumex today  - continue metoprolol 100mg ER BID.   - Resume bumex today
Last EF still ~30% with AICD,   At home on metoprolol, entresto, Epleronone, bumex, sglt2  - holding entresto epleronone and sglt2 in the setting of hypotension- but likely at baseline  - continue metoprolol 100mg ER BID.   - Start IV Bumex today, may need further diuresis  Cardio consulted to assist with further management  Right lower ext with markedly increased swelling compared to left will order Doppler lower ext venous.
Last EF still ~30% with AICD,   At home on metoprolol, entresto, Epleronone, bumex, sglt2  - holding entresto epleronone and sglt2 in the setting of hypotension- but likely at baseline  - continue metoprolol 100mg ER BID.   - Start IV Bumex today, may need further diuresis  Cardio consulted to assist with further management  Lower ext doppler with no evidence of dvt
on lantus 50 u bedtime and lispro as well  - given poor po intake, halve lantus to 25 u bedtime  - monitor fs tidac and c/w ISS
Last EF still ~30% with AICD,   At home on metoprolol, entresto, Epleronone, bumex, sglt2  - holding entresto epleronone and sglt2 in the setting of hypotension  - continue metoprolol 100mg ER BID.   - Bumex discontinued due to worsening of renal function and continued extrarenal losses
Last EF still ~30% with AICD,   At home on metoprolol, entresto, Epleronone, bumex, sglt2  - holding entresto epleronone and sglt2 in the setting of hypotension  - continue metoprolol 100mg ER BID.   - Bumex discontinued today due to worsening of renal function and continued extrarenal losses

## 2023-06-03 NOTE — PROGRESS NOTE ADULT - PROBLEM SELECTOR PROBLEM 3
Nausea vomiting and diarrhea
Nausea vomiting and diarrhea
Acute respiratory failure with hypoxia
Nausea vomiting and diarrhea

## 2023-06-03 NOTE — PROGRESS NOTE ADULT - PROBLEM SELECTOR PROBLEM 5
Heart failure with reduced ejection fraction
Acute kidney injury superimposed on CKD

## 2023-06-03 NOTE — DISCHARGE NOTE PROVIDER - HOSPITAL COURSE
63 yo M PMHx of HFrEF EF 29%, ICM, ICD, IDDM2, COPD, LUIS ALFREDO, CKD3-4 p/w 1 day of  N/V/D. Hx also obtained from wife at bedside. Pt reports feeling unwell for the past week, recently saw his wound specialist for a chronic r foot wound and had the wound debrided. Since then, he has not felt well and with chills at home. On day of presentation to the ED, pt c/o multiple episodes of nausea, nbnb emesis, and nonbloody diarrhea. Denies any sick contacts at home or anyone with similar symptoms including children. Wife feels this presentation is identical to prior admissions for sepsis in the setting of leg cellulitis after wound debridement. Pt denies any recent trauma to foot, any cp, sob.    Admitted for further management for sepsis. Started cefazolin with improvement in sepsis.Cultures grew strept dysgalactiae, repeat cultures negative. Sepsis complicated by worsening of renal function. Diuresis discontinued with improvement of renal function and at baseline at time of discharge. Heart failure meds held due to hypotension and gradually returned to baseline.     Patient is stable for discharge per attending and is advised to follow up with PCP as outpatient  Please refer to patient's complete medical chart with documents for a full hospital course, for this is only a brief summary.

## 2023-06-03 NOTE — DISCHARGE NOTE PROVIDER - NSDCMRMEDTOKEN_GEN_ALL_CORE_FT
Albuterol (Eqv-ProAir HFA) 90 mcg/inh inhalation aerosol: 2 puff(s) inhaled every 6 hours as needed  ALPRAZolam 0.25 mg oral tablet: 1 tab(s) orally once a day as needed  aspirin 81 mg oral delayed release tablet: 1 tab(s) orally once a day  bumetanide 2 mg oral tablet: 2 tab(s) orally 2 times a day  clopidogrel 75 mg oral tablet: 1 tab(s) orally once a day  Entresto 24 mg-26 mg oral tablet: 1 tab(s) orally 2 times a day  eplerenone 25 mg oral tablet: 1 tab(s) orally once a day  HumaLOG 100 units/mL injectable solution: 20 unit(s) subcutaneous 3 times a day (before meals)  Jardiance 10 mg oral tablet: 1 tab(s) orally once a day  Lantus 100 units/mL subcutaneous solution: 50 unit(s) subcutaneous once a day (at bedtime)  metoprolol succinate 100 mg oral tablet, extended release: 1 tab(s) orally 2 times a day  pantoprazole 40 mg oral delayed release tablet: 1 tab(s) orally once a day  rosuvastatin 20 mg oral tablet: 1 tab(s) orally once a day (at bedtime)  Symbicort 160 mcg-4.5 mcg/inh inhalation aerosol: 2 puff(s) inhaled 2 times a day  tamsulosin 0.4 mg oral capsule: 1 cap(s) orally once a day (at bedtime)   Albuterol (Eqv-ProAir HFA) 90 mcg/inh inhalation aerosol: 2 puff(s) inhaled every 6 hours as needed  ALPRAZolam 0.25 mg oral tablet: 1 tab(s) orally once a day as needed  ammonium lactate 12% topical lotion: Apply topically to affected area 2 times a day BL soles of feet  aspirin 81 mg oral delayed release tablet: 1 tab(s) orally once a day  bumetanide 2 mg oral tablet: 2 tab(s) orally 2 times a day  cephalexin 500 mg oral capsule: 1 cap(s) orally 4 times a day  clopidogrel 75 mg oral tablet: 1 tab(s) orally once a day  Entresto 24 mg-26 mg oral tablet: 1 tab(s) orally 2 times a day  eplerenone 25 mg oral tablet: 1 tab(s) orally once a day  HumaLOG 100 units/mL injectable solution: 20 unit(s) subcutaneous 3 times a day (before meals)  Jardiance 10 mg oral tablet: 1 tab(s) orally once a day  Lantus 100 units/mL subcutaneous solution: 50 unit(s) subcutaneous once a day (at bedtime)  metoprolol succinate 100 mg oral tablet, extended release: 1 tab(s) orally 2 times a day  pantoprazole 40 mg oral delayed release tablet: 1 tab(s) orally once a day  rosuvastatin 20 mg oral tablet: 1 tab(s) orally once a day (at bedtime)  Symbicort 160 mcg-4.5 mcg/inh inhalation aerosol: 2 puff(s) inhaled 2 times a day  tamsulosin 0.4 mg oral capsule: 1 cap(s) orally once a day (at bedtime)

## 2023-06-03 NOTE — PROGRESS NOTE ADULT - PROBLEM SELECTOR PLAN 4
Improved- no further use of oxygen supplementation  - CT chest noted, increased pleural effusion  - DC'ed ICS/LABA, continue tirso prn
Improved- no further use of oxygen supplementation  - CT chest noted, increased pleural effusion  - DC'ed ICS/LABA, continue tirso prn  - Plan for DC today, repeat imaging after discharge
Improved- no further use of oxygen supplementation  - CT chest noted, increased pleural effusion  - DC'ed ICS/LABA, continue tirso prn
CKD stage 3b    Baseline Cr 1.71 - elevated >2 on admission and remains around 2.55  continue Bumex at a lower dose due to HFrEF, no fluids due to HF\  Trend BUN/Cr
Improved- no further use of oxygen supplementation  - CT chest noted, increased pleural effusion  - DC ICS/LABA, continue tirso prn
Improved- no further use of oxygen supplementation  - CT chest noted, increased pleural effusion

## 2023-06-03 NOTE — DISCHARGE NOTE NURSING/CASE MANAGEMENT/SOCIAL WORK - NSDCVIVACCINE_GEN_ALL_CORE_FT
influenza, injectable, quadrivalent, preservative free; 08-Feb-2018 11:10; Chely Patterson (RN); [employer]; [id number]; IntraMuscular; Deltoid Left.; 0.5 milliLiter(s); VIS (VIS Published: 07-Aug-2015, VIS Presented: 08-Feb-2018);   influenza, injectable, quadrivalent, preservative free; 21-Sep-2020 18:51; Yan Cazares (RN); [employer]; [id number] (Exp. Date: 30-Jun-2021); IntraMuscular; Deltoid Right.; 0.5 milliLiter(s); VIS (VIS Published: 15-Aug-2019, VIS Presented: 21-Sep-2020);

## 2023-06-03 NOTE — PROGRESS NOTE ADULT - PROBLEM SELECTOR PLAN 3
Improved  Continue to monitor
Diarrhea improving  - hold diuretics, continue monitoring diarrhea, if has >3 episodes a day would obtain stool culture
likely atelectasis from lethargy/weakness, briefly required NC O2 for 89% RA  - IC 10x/hr  - management of sepsis as above  - supplemental O2 if O2<88% given COPD hx, goal ~94%  - c/w home symbicort and albuterol
Improved  Continue to monitor
Improved  Resume diuretics today
Improving, but diarrhea persistent  - only 500cc IV fluids on admission due to heart failure  - suspect has pre-renal servando due to continued extra renal losses  - hold diuretics, continue monitoring diarrhea, if has >3 episodes a day would obtain stool culture

## 2023-06-03 NOTE — PROGRESS NOTE ADULT - PROBLEM SELECTOR PLAN 1
GPC Bacteremia due to BLLE Cellulitis, OM ruled out  Increase ancef to 2g q8h (has multiple abx allergies but tolerated this in the past)  CT leg b/l ordered revealed BLLE severe cellulities  wound c/s  lac hydrin cream bid to foot  tylenol prn  F/u blood culture speciation and sensitivity  hold entresto in setting of sepsis  trend lactate
Improving  On Cefazolin 2g q8  ABX for 10 days until 6/7. Started 5/28  Change to Keflex on DC  Repeat cultures neg to date  Appreciate ID recs
Improving  On Cefazolin 2g q8  ABX for 10 days until 6/7. Started 5/28  Change to Keflex on DC  Repeat cultures neg to date  Appreciate ID recs  To be DC today on 5 days of Keflex 500mg 4x a day until 6/7
Recurrent skin and soft tissue infections due to recent debridement  Improving, leukocytosis improved  - Blood cultures with strept dysgalactiae.  - On cefazolin- dosing adjusted due to GFR, 2g BID  - Repeat blood cultures neagative to date  - Podiatry consult
Recurrent skin and soft tissue infections- recent debridement  - Blood cultures with strept dysgalactiae.  - On cefazolin- dosing adjusted due to GFR, 2g BID  - Repeat blood cultures drawn and pending  - Suspect infection improving- achieved defervescence, leukocytosis improving but renal function worsening  - Podiatry consult
Recurrent skin and soft tissue infections- recent debridement  - Blood cultures with strept dysgalactiae.  - On cefazolin- dosing adjusted due to GFR, 2g BID  - Repeat blood cultures to ensure clearance  - Suspect infection improving- achieved defervescence, leukocytosis improving but renal function worsening  - Podiatry consult

## 2023-06-03 NOTE — DISCHARGE NOTE PROVIDER - CARE PROVIDER_API CALL
Lul Cruz  Internal Medicine  [address] [place] [address]  Phone: [phone]  Fax: [phone]  Follow Up Time:

## 2023-06-03 NOTE — DISCHARGE NOTE NURSING/CASE MANAGEMENT/SOCIAL WORK - NSDCPEFALRISK_GEN_ALL_CORE
For information on Fall & Injury Prevention, visit: [email or web address] OR  [email or web address] OR  https://www.cdc.gov/steadi/patient.html

## 2023-06-03 NOTE — PROGRESS NOTE ADULT - PROBLEM SELECTOR PROBLEM 4
Acute respiratory failure with hypoxia
Acute kidney injury superimposed on CKD
Acute respiratory failure with hypoxia
Acute respiratory failure with hypoxia

## 2023-06-03 NOTE — PROGRESS NOTE ADULT - PROVIDER SPECIALTY LIST ADULT
Internal Medicine
Internal Medicine
Reports \"I'm just so sick\" x2-3 days but got worse last pm. Reports nausea and pain to lower center chest.
Nephrology
Nephrology
Infectious Disease
Nephrology
Podiatry
Nephrology
Internal Medicine
Hospitalist

## 2023-06-03 NOTE — PROGRESS NOTE ADULT - PROBLEM SELECTOR PROBLEM 2
Bacteremia due to Streptococcus
Nausea vomiting and diarrhea
Bacteremia due to Streptococcus

## 2023-06-03 NOTE — PROGRESS NOTE ADULT - PROBLEM SELECTOR PLAN 8
dvt ppx:hsq  no PT needs  Dispo: Home- patient and family
dvt ppx:hsq  no PT needs  Dispo: Home- discussed with patient and family
dvt ppx:hsq  no PT needs  Dispo: Home- patient and family
dvt ppx:hsq  no PT needs  Dispo: Home- discussed with patient and family
dvt ppx:hsq  no PT needs  Dispo: Home- discussed with patient and family

## 2023-06-03 NOTE — DISCHARGE NOTE PROVIDER - NSDCFUSCHEDAPPT_GEN_ALL_CORE_FT
Erick Levy  [facility]  WOUNDCARE [address]  Scheduled Appointment: 06/29/2023    [facility]  ELECTROPH [address]  Scheduled Appointment: 07/27/2023

## 2023-06-03 NOTE — DISCHARGE NOTE PROVIDER - NSDCCPCAREPLAN_GEN_ALL_CORE_FT
PRINCIPAL DISCHARGE DIAGNOSIS  Diagnosis: Bacteremia due to Streptococcus  Assessment and Plan of Treatment: You wre admitted due to sepsis due to cellulitis of your legs. Blood cultures grew strept. You were treated with IV antibiotics. Repeat blood cultures with no growth. Please continue course of antibiotics until 6/7/2023.      SECONDARY DISCHARGE DIAGNOSES  Diagnosis: Heart failure with reduced ejection fraction  Assessment and Plan of Treatment: Continue with home medications as listed on your medication reconcilliation. Please follow up with Dr. Cruz within 1 week after discharge for titration of medications.    Diagnosis: Acute kidney injury superimposed on CKD  Assessment and Plan of Treatment: Hospital stay complicated by worsening of your kidneys. This returned to normal. In the future, with any vomiting or diarrhea, please contact your primary care physician immediately, diuretic medications may need to be adjusted.

## 2023-06-03 NOTE — DISCHARGE NOTE NURSING/CASE MANAGEMENT/SOCIAL WORK - PATIENT PORTAL LINK FT
You can access the FollowMyHealth Patient Portal offered by [facility] by registering at the following website: [email or web address]. By joining [facility]’s FollowMyHealth portal, you will also be able to view your health information using other applications (apps) compatible with our system.

## 2023-06-03 NOTE — PROGRESS NOTE ADULT - PROBLEM SELECTOR PLAN 2
As noted on cultures  - continue cefazolin renally dosed  - repeat cultures obtained and pending  - suspect infection improving- cellulitis better, leukocytosis better no more fevers  - Appreciate ID recs, Dr. Morris following
Repeat cultures negative to date
Repeat cultures negative to date
CTAP unrevealing for n/v/d  may be 2/2 sepsis, viral gi illness. cdiff neg  check GI PCR  monitor  hold off on further ivf given chf hx
As noted on cultures  - continue cefazolin renally dosed  - repeat cultures negative to date  - improving  - Appreciate ID recs, Dr. Morris following
As noted on cultures  - continue cefazolin renally dosed  - repeat cultures  - suspect infection improving- cellulitis better, leukocytosis better no more fevers  - Appreciate ID recs, Dr. Morris following
Never smoker

## 2023-06-03 NOTE — PROGRESS NOTE ADULT - REASON FOR ADMISSION
weakness, N/V/D

## 2023-06-03 NOTE — PROGRESS NOTE ADULT - PROBLEM SELECTOR PROBLEM 6
Heart failure with reduced ejection fraction
Diabetes
Heart failure with reduced ejection fraction

## 2023-06-03 NOTE — PROGRESS NOTE ADULT - SUBJECTIVE AND OBJECTIVE BOX
[facility]   [phone]  RENAL FOLLOW UP NOTE  --------------------------------------------------------------------------------  Chief Complaint:    24 hour events/subjective:    seen earlier   states breathing is better and urinating well     PAST HISTORY  --------------------------------------------------------------------------------  No significant changes to PMH, PSH, FHx, SHx, unless otherwise noted    ALLERGIES & MEDICATIONS  --------------------------------------------------------------------------------  Allergies    doxepin (Other)  Zosyn (Pruritus)  vancomycin (Rash (Mild to Mod))  ceftriaxone (Rash)    Intolerances      Standing Inpatient Medications  ammonium lactate 12% Lotion 1 Application(s) Topical two times a day  aspirin enteric coated 81 milliGRAM(s) Oral daily  atorvastatin 80 milliGRAM(s) Oral at bedtime  buMETAnide Injectable 2 milliGRAM(s) IV Push two times a day  ceFAZolin   IVPB 2000 milliGRAM(s) IV Intermittent every 8 hours  clopidogrel Tablet 75 milliGRAM(s) Oral daily  dextrose 5%. 1000 milliLiter(s) IV Continuous <Continuous>  dextrose 50% Injectable 25 Gram(s) IV Push once  heparin   Injectable 5000 Unit(s) SubCutaneous every 8 hours  insulin glargine Injectable (LANTUS) 30 Unit(s) SubCutaneous at bedtime  insulin lispro (ADMELOG) corrective regimen sliding scale   SubCutaneous three times a day before meals  insulin lispro (ADMELOG) corrective regimen sliding scale   SubCutaneous at bedtime  metoprolol succinate ER 50 milliGRAM(s) Oral every 12 hours  pantoprazole    Tablet 40 milliGRAM(s) Oral before breakfast  tamsulosin 0.4 milliGRAM(s) Oral at bedtime    PRN Inpatient Medications  acetaminophen     Tablet .. 650 milliGRAM(s) Oral every 6 hours PRN  albuterol    90 MICROgram(s) HFA Inhaler 2 Puff(s) Inhalation every 6 hours PRN  ALPRAZolam 0.25 milliGRAM(s) Oral daily PRN  aluminum hydroxide/magnesium hydroxide/simethicone Suspension 30 milliLiter(s) Oral every 4 hours PRN  dextrose Oral Gel 15 Gram(s) Oral once PRN  melatonin 3 milliGRAM(s) Oral at bedtime PRN  ondansetron Injectable 4 milliGRAM(s) IV Push every 8 hours PRN      REVIEW OF SYSTEMS  --------------------------------------------------------------------------------    Gen: denies  fevers/chills,  CVS: denies chest pain/palpitations  Resp: denies SOB/Cough  GI: Denies N/V/Abd pain  : Denies dysuria/oliguria/hematuria      VITALS/PHYSICAL EXAM  --------------------------------------------------------------------------------  T(C): 36.5 (06-03-23 @ 12:08), Max: 36.6 (06-03-23 @ 04:04)  HR: 76 (06-03-23 @ 12:08) (76 - 86)  BP: 103/70 (06-03-23 @ 12:08) (100/63 - 103/70)  RR: 18 (06-03-23 @ 12:08) (18 - 18)  SpO2: 95% (06-03-23 @ 12:08) (95% - 95%)  Wt(kg): --    Weight (kg): 122.7 (06-02-23 @ 05:50)      06-02-23 @ 07:01  -  06-03-23 @ 07:00  --------------------------------------------------------  IN: 820 mL / OUT: 950 mL / NET: -130 mL    06-03-23 @ 07:01  -  06-03-23 @ 22:54  --------------------------------------------------------  IN: 0 mL / OUT: 1300 mL / NET: -1300 mL      Physical Exam:  	    Gen: Non toxic comfortable appearing, on RA   	Pulm: decrease bs R>L, no rales or ronchi or wheezing  	CV: +JVD. RRR, S1S2; no rub  	Abd: +BS, soft, nontender/nondistended, obese  	: No suprapubic distention  	UE: Warm, no cyanosis  no clubbing,  no edema;   	LE: Warm, no cyanosis  no clubbing, RLE edema, erythema noted up to inner thigh. 2 - edema    LABS/STUDIES  --------------------------------------------------------------------------------              11.7   8.71  >-----------<  152      [06-02-23 @ 07:15]              38.5     136  |  98  |  72  ----------------------------<  172      [06-03-23 @ 07:22]  3.4   |  22  |  1.75        Ca     8.6     [06-03-23 @ 07:22]      Mg     2.6     [06-03-23 @ 07:22]      Phos  2.5     [06-03-23 @ 07:22]            Creatinine Trend:  SCr 1.75 [06-03 @ 07:22]  SCr 2.37 [06-02 @ 07:15]  SCr 2.73 [06-01 @ 07:19]  SCr 3.01 [05-31 @ 07:16]  SCr 2.99 [05-30 @ 07:01]              Urinalysis - [05-31-23 @ 07:20]      Color Yellow / Appearance Slightly Turbid / SG 1.016 / pH 6.0      Gluc Negative / Ketone Negative  / Bili Negative / Urobili Negative       Blood Small / Protein 100 mg/dl / Leuk Est Negative / Nitrite Negative      RBC 2 / WBC 2 / Hyaline 14 / Gran 4 / Sq Epi  / Non Sq Epi  / Bacteria Negative    Urine Creatinine 130      [05-31-23 @ 07:20]  Urine Protein 74      [05-31-23 @ 07:20]    Iron 14, TIBC --, %sat --      [03-02-23 @ 01:20]  Ferritin 225      [03-02-23 @ 01:18]  PTH -- (Ca 8.9)      [10-02-22 @ 07:04]   73  HbA1c 8.9      [12-16-19 @ 08:15]  TSH 3.02      [09-30-22 @ 13:45]

## 2023-06-04 LAB
CULTURE RESULTS: SIGNIFICANT CHANGE UP
SPECIMEN SOURCE: SIGNIFICANT CHANGE UP

## 2023-06-05 ENCOUNTER — APPOINTMENT (OUTPATIENT)
Dept: CARDIOLOGY | Facility: CLINIC | Age: 63
End: 2023-06-05

## 2023-06-06 LAB
CULTURE RESULTS: SIGNIFICANT CHANGE UP
SPECIMEN SOURCE: SIGNIFICANT CHANGE UP

## 2023-06-07 ENCOUNTER — APPOINTMENT (OUTPATIENT)
Dept: CARDIOLOGY | Facility: CLINIC | Age: 63
End: 2023-06-07

## 2023-06-08 ENCOUNTER — APPOINTMENT (OUTPATIENT)
Dept: CARDIOLOGY | Facility: CLINIC | Age: 63
End: 2023-06-08

## 2023-06-12 ENCOUNTER — APPOINTMENT (OUTPATIENT)
Dept: CARDIOLOGY | Facility: CLINIC | Age: 63
End: 2023-06-12

## 2023-06-14 ENCOUNTER — APPOINTMENT (OUTPATIENT)
Dept: CARDIOLOGY | Facility: CLINIC | Age: 63
End: 2023-06-14

## 2023-06-15 ENCOUNTER — OUTPATIENT (OUTPATIENT)
Dept: OUTPATIENT SERVICES | Facility: HOSPITAL | Age: 63
LOS: 1 days | End: 2023-06-15
Payer: MEDICARE

## 2023-06-15 ENCOUNTER — APPOINTMENT (OUTPATIENT)
Dept: WOUND CARE | Facility: CLINIC | Age: 63
End: 2023-06-15
Payer: MEDICARE

## 2023-06-15 VITALS
OXYGEN SATURATION: 95 % | HEART RATE: 95 BPM | SYSTOLIC BLOOD PRESSURE: 125 MMHG | DIASTOLIC BLOOD PRESSURE: 77 MMHG | TEMPERATURE: 98.2 F | RESPIRATION RATE: 16 BRPM | HEIGHT: 77 IN

## 2023-06-15 DIAGNOSIS — L97.511 NON-PRESSURE CHRONIC ULCER OF OTHER PART OF RIGHT FOOT LIMITED TO BREAKDOWN OF SKIN: ICD-10-CM

## 2023-06-15 DIAGNOSIS — E11.42 TYPE 2 DIABETES MELLITUS WITH DIABETIC POLYNEUROPATHY: ICD-10-CM

## 2023-06-15 DIAGNOSIS — Z79.4 TYPE 2 DIABETES MELLITUS WITH DIABETIC POLYNEUROPATHY: ICD-10-CM

## 2023-06-15 DIAGNOSIS — Z98.52 VASECTOMY STATUS: Chronic | ICD-10-CM

## 2023-06-15 PROCEDURE — 11042 DBRDMT SUBQ TIS 1ST 20SQCM/<: CPT

## 2023-06-15 NOTE — ASSESSMENT
[FreeTextEntry1] : 62 M with RIght foot plantar submet 1 to 2 fissure \par - Pt seen and evaluated.\par - Right foot fissure to level of dermis, with no acute signs of infection. \par - Hyperkeratosis debrided to level of dermis using a sterile #10 blade. No signs of infection. Right foot fissure cauterized using silver nitrate, then dressed w/ aquacell and DSD. No wound care needed for right foot at  home. \par - Instructed patient to wear compression stockings for b/l lower leg edema. \par - Instructed patient to wear supportive diabetic shoes at home with supportive insoles at all times. Discontinue use of house slipper\par - Pt will be in [place] for the summer, given contact info for podiatrist in [place] and asked to make follow up appointment immediately. \par - RTC after return to [place]

## 2023-06-15 NOTE — HISTORY OF PRESENT ILLNESS
[FreeTextEntry1] : The patient is seen for follow-up of submet 3 wound of the right foot . Pt notes that last friday his right foot fissure re-opened up. He recent got new house ugg slippers. He was admitted to [facility] on 5/28/23 and DC'd on 6/3/23 for cellulitis and sepsis. Pt was bacteremic in the hospital and was evaluated by Dr Strong for elongated toenails. Pt was discharged on PO Keflex for 10 days till 6/4. Pt denies any F/C/N/V/SOB or other complaints\par \par HbA1c is 8%\par \par Pt. was recently admitted for bacteremia from chronic swelling of lower extremities. Chronic venous stasis/lymphedema suspected. He admits he has support hose and hates them and never wears them. He would be more compliant w pneumatic compressive devises.

## 2023-07-12 DIAGNOSIS — Z79.4 LONG TERM (CURRENT) USE OF INSULIN: ICD-10-CM

## 2023-07-12 DIAGNOSIS — L97.511 NON-PRESSURE CHRONIC ULCER OF OTHER PART OF RIGHT FOOT LIMITED TO BREAKDOWN OF SKIN: ICD-10-CM

## 2023-07-12 DIAGNOSIS — E11.42 TYPE 2 DIABETES MELLITUS WITH DIABETIC POLYNEUROPATHY: ICD-10-CM

## 2023-07-12 PROBLEM — Z92.29 PERSONAL HISTORY OF OTHER DRUG THERAPY: Chronic | Status: ACTIVE | Noted: 2023-05-28

## 2023-07-12 PROBLEM — U07.1 COVID-19: Chronic | Status: ACTIVE | Noted: 2023-05-28

## 2023-07-12 NOTE — H&P ADULT - VTE RISK ASSESSMENT
Impression: Combined forms of age-related cataract, bilateral: H25.813. Plan: Discussed cataract diagnosis with the patient. Discussed and reviewed treatment options for cataracts. Risks and benefits of surgical treatment were discussed and understood. Pt interested in surgical treatment. Refer for consult w/Dr. Halley Martins, next available. <<--- Click to launch

## 2023-08-02 NOTE — HISTORY OF PRESENT ILLNESS
[FreeTextEntry1] : 61 YEAR OLD DIABETIC PATIENT presents to wound center for evaluation of infected left 3rd toe\par - patient was seen by Dr. Foster for an infected corn\par - patient went away on vacation and bone became infected\par - patient went to [facility] and was seen by Dr. Sigala for osteomyelitis\par - left 3rd ray resection s/p 3 weeks (1/21/22)\par - sutures intact with plantar ulcer left foot with no signs of cellulitis and no signs of acute infection\par - Surgical pathology: no OM\par - pt states that he has been compliant with weight bearing only using the CAM boot\par - pt is now ambulating using his custom orthotics in [facility] \par - Pt states that he will be going to [place] for the summer \par - Pt denies N/V/C/F/SOB Tarsorrhaphy Text: A tarsorrhaphy was performed using Frost sutures.

## 2023-08-18 NOTE — CONSULT NOTE ADULT - NS_MD_PANP_GEN_ALL_CORE
Attending and PA/NP shared services statement (NON-critical care): The patient is a 89y Male complaining of urinary symptoms.

## 2023-08-26 ENCOUNTER — APPOINTMENT (OUTPATIENT)
Dept: ELECTROPHYSIOLOGY | Facility: CLINIC | Age: 63
End: 2023-08-26
Payer: MEDICARE

## 2023-08-26 PROCEDURE — 93261 INTERROGATE SUBQ DEFIB: CPT

## 2023-08-30 NOTE — ED PROVIDER NOTE - CROS ED CARDIOVAS ALL NEG
- small to moderate left sided effusion on CT  - pulmonary consult for possible thoracentesis  - Echocardiogram completed awaiting final read, prelim EF 64% - - -

## 2023-09-05 ENCOUNTER — EMERGENCY (EMERGENCY)
Facility: HOSPITAL | Age: 63
LOS: 1 days | Discharge: HOME | End: 2023-09-05
Attending: EMERGENCY MEDICINE
Payer: MEDICARE

## 2023-09-05 VITALS
RESPIRATION RATE: 18 BRPM | HEART RATE: 88 BPM | OXYGEN SATURATION: 95 % | TEMPERATURE: 98 F | SYSTOLIC BLOOD PRESSURE: 107 MMHG | DIASTOLIC BLOOD PRESSURE: 68 MMHG

## 2023-09-05 VITALS
WEIGHT: 263.01 LBS | RESPIRATION RATE: 17 BRPM | TEMPERATURE: 98 F | DIASTOLIC BLOOD PRESSURE: 70 MMHG | HEIGHT: 75 IN | SYSTOLIC BLOOD PRESSURE: 109 MMHG | HEART RATE: 89 BPM | OXYGEN SATURATION: 98 %

## 2023-09-05 DIAGNOSIS — Z98.52 VASECTOMY STATUS: Chronic | ICD-10-CM

## 2023-09-05 LAB
ALBUMIN SERPL ELPH-MCNC: 3.5 G/DL — SIGNIFICANT CHANGE UP (ref 3.3–5)
ALP SERPL-CCNC: 136 U/L — HIGH (ref 40–120)
ALT FLD-CCNC: 11 U/L — SIGNIFICANT CHANGE UP (ref 10–45)
ANION GAP SERPL CALC-SCNC: 18 MMOL/L — HIGH (ref 5–17)
APTT BLD: 35.1 SEC — SIGNIFICANT CHANGE UP (ref 24.5–35.6)
AST SERPL-CCNC: 21 U/L — SIGNIFICANT CHANGE UP (ref 10–40)
BASOPHILS # BLD AUTO: 0.06 K/UL — SIGNIFICANT CHANGE UP (ref 0–0.2)
BASOPHILS NFR BLD AUTO: 0.6 % — SIGNIFICANT CHANGE UP (ref 0–2)
BILIRUB SERPL-MCNC: 0.8 MG/DL — SIGNIFICANT CHANGE UP (ref 0.2–1.2)
BUN SERPL-MCNC: 36 MG/DL — HIGH (ref 7–23)
CALCIUM SERPL-MCNC: 9.2 MG/DL — SIGNIFICANT CHANGE UP (ref 8.4–10.5)
CHLORIDE SERPL-SCNC: 100 MMOL/L — SIGNIFICANT CHANGE UP (ref 96–108)
CO2 SERPL-SCNC: 22 MMOL/L — SIGNIFICANT CHANGE UP (ref 22–31)
CREAT SERPL-MCNC: 1.7 MG/DL — HIGH (ref 0.5–1.3)
EGFR: 46 ML/MIN/1.73M2 — LOW
EOSINOPHIL # BLD AUTO: 0.21 K/UL — SIGNIFICANT CHANGE UP (ref 0–0.5)
EOSINOPHIL NFR BLD AUTO: 2 % — SIGNIFICANT CHANGE UP (ref 0–6)
GLUCOSE SERPL-MCNC: 86 MG/DL — SIGNIFICANT CHANGE UP (ref 70–99)
HCT VFR BLD CALC: 40.6 % — SIGNIFICANT CHANGE UP (ref 39–50)
HGB BLD-MCNC: 12.2 G/DL — LOW (ref 13–17)
IMM GRANULOCYTES NFR BLD AUTO: 1 % — HIGH (ref 0–0.9)
INR BLD: 1.39 RATIO — HIGH (ref 0.85–1.18)
LYMPHOCYTES # BLD AUTO: 0.93 K/UL — LOW (ref 1–3.3)
LYMPHOCYTES # BLD AUTO: 8.9 % — LOW (ref 13–44)
MCHC RBC-ENTMCNC: 23.6 PG — LOW (ref 27–34)
MCHC RBC-ENTMCNC: 30 GM/DL — LOW (ref 32–36)
MCV RBC AUTO: 78.7 FL — LOW (ref 80–100)
MONOCYTES # BLD AUTO: 1.35 K/UL — HIGH (ref 0–0.9)
MONOCYTES NFR BLD AUTO: 12.9 % — SIGNIFICANT CHANGE UP (ref 2–14)
NEUTROPHILS # BLD AUTO: 7.79 K/UL — HIGH (ref 1.8–7.4)
NEUTROPHILS NFR BLD AUTO: 74.6 % — SIGNIFICANT CHANGE UP (ref 43–77)
NRBC # BLD: 0 /100 WBCS — SIGNIFICANT CHANGE UP (ref 0–0)
PLATELET # BLD AUTO: 227 K/UL — SIGNIFICANT CHANGE UP (ref 150–400)
POTASSIUM SERPL-MCNC: 3.3 MMOL/L — LOW (ref 3.5–5.3)
POTASSIUM SERPL-SCNC: 3.3 MMOL/L — LOW (ref 3.5–5.3)
PROT SERPL-MCNC: 7.6 G/DL — SIGNIFICANT CHANGE UP (ref 6–8.3)
PROTHROM AB SERPL-ACNC: 15.1 SEC — HIGH (ref 9.5–13)
RBC # BLD: 5.16 M/UL — SIGNIFICANT CHANGE UP (ref 4.2–5.8)
RBC # FLD: 17.6 % — HIGH (ref 10.3–14.5)
SODIUM SERPL-SCNC: 140 MMOL/L — SIGNIFICANT CHANGE UP (ref 135–145)
WBC # BLD: 10.44 K/UL — SIGNIFICANT CHANGE UP (ref 3.8–10.5)
WBC # FLD AUTO: 10.44 K/UL — SIGNIFICANT CHANGE UP (ref 3.8–10.5)

## 2023-09-05 PROCEDURE — 73630 X-RAY EXAM OF FOOT: CPT

## 2023-09-05 PROCEDURE — 93971 EXTREMITY STUDY: CPT | Mod: 26,LT

## 2023-09-05 PROCEDURE — 99285 EMERGENCY DEPT VISIT HI MDM: CPT

## 2023-09-05 PROCEDURE — 96375 TX/PRO/DX INJ NEW DRUG ADDON: CPT

## 2023-09-05 PROCEDURE — 73590 X-RAY EXAM OF LOWER LEG: CPT

## 2023-09-05 PROCEDURE — 73630 X-RAY EXAM OF FOOT: CPT | Mod: 26,LT

## 2023-09-05 PROCEDURE — 73610 X-RAY EXAM OF ANKLE: CPT | Mod: 26,LT

## 2023-09-05 PROCEDURE — 73590 X-RAY EXAM OF LOWER LEG: CPT | Mod: 26,LT

## 2023-09-05 PROCEDURE — 85610 PROTHROMBIN TIME: CPT

## 2023-09-05 PROCEDURE — 86900 BLOOD TYPING SEROLOGIC ABO: CPT

## 2023-09-05 PROCEDURE — 73610 X-RAY EXAM OF ANKLE: CPT

## 2023-09-05 PROCEDURE — 96374 THER/PROPH/DIAG INJ IV PUSH: CPT

## 2023-09-05 PROCEDURE — 85730 THROMBOPLASTIN TIME PARTIAL: CPT

## 2023-09-05 PROCEDURE — 80053 COMPREHEN METABOLIC PANEL: CPT

## 2023-09-05 PROCEDURE — 86901 BLOOD TYPING SEROLOGIC RH(D): CPT

## 2023-09-05 PROCEDURE — 86850 RBC ANTIBODY SCREEN: CPT

## 2023-09-05 PROCEDURE — 99285 EMERGENCY DEPT VISIT HI MDM: CPT | Mod: 25

## 2023-09-05 PROCEDURE — 93971 EXTREMITY STUDY: CPT

## 2023-09-05 PROCEDURE — 85025 COMPLETE CBC W/AUTO DIFF WBC: CPT

## 2023-09-05 RX ORDER — ACETAMINOPHEN 500 MG
975 TABLET ORAL ONCE
Refills: 0 | Status: COMPLETED | OUTPATIENT
Start: 2023-09-05 | End: 2023-09-05

## 2023-09-05 RX ORDER — CEFAZOLIN SODIUM 1 G
2000 VIAL (EA) INJECTION ONCE
Refills: 0 | Status: COMPLETED | OUTPATIENT
Start: 2023-09-05 | End: 2023-09-05

## 2023-09-05 RX ORDER — KETOROLAC TROMETHAMINE 30 MG/ML
15 SYRINGE (ML) INJECTION ONCE
Refills: 0 | Status: DISCONTINUED | OUTPATIENT
Start: 2023-09-05 | End: 2023-09-05

## 2023-09-05 RX ADMIN — Medication 100 MILLIGRAM(S): at 15:36

## 2023-09-05 RX ADMIN — Medication 975 MILLIGRAM(S): at 13:12

## 2023-09-05 RX ADMIN — Medication 15 MILLIGRAM(S): at 13:50

## 2023-09-05 NOTE — ED ADULT NURSE NOTE - OBJECTIVE STATEMENT
Pt 61 y/o male, AxOX3, presents to ED from PCP office for LLE x few days. Pt reporting LLE pain, swelling and redness. Seen by PCP and placed on PO abx without relief. Prompted to be seen in ED for cellulitis vs DVT=. PMH of pacemaker, on plavix. Pt is well appearing, speaking full sentences without difficulty. Breathing spontaneous and unlabored. Ambulated with some pain. Has been taking tylenol without relief. Upon assessment, abdomen soft and nontender, +strong peripheral pulses, moving all extremities without difficulty, lungs clear. Redness and swelling to b/l LE, worse on left. Denies fevers/ chills. Safety and comfort measures initiated- bed placed in lowest position and side rails raised. Pt oriented to call bell system.

## 2023-09-05 NOTE — ED PROVIDER NOTE - NSICDXFAMILYHX_GEN_ALL_CORE_FT
FAMILY HISTORY:  Family history of cardiac disorder, Paternal    Sibling  Still living? Unknown  Family history of COPD (chronic obstructive pulmonary disease), Age at diagnosis: Age Unknown     Yes

## 2023-09-05 NOTE — ED PROVIDER NOTE - CLINICAL SUMMARY MEDICAL DECISION MAKING FREE TEXT BOX
62M w/ hx HFrEF EF 29%, ICM, ICD, IDDM2, COPD, LUIS ALFREDO, CKD3-4 presenting with leg pain. Patient started having left lower extremity pain a couple days ago, was prescribed Levaquin without improvement.  Pain extends from foot to back of knee with associated calf swelling and mild erythema on lower leg.  No fever, chest pain, shortness of breath, abdominal pain, nausea, vomiting, diarrhea. Exam shows L calf swelling, ttp, erythema to lower shin and calf. c/f cellulitis less likely dvt. will get labs, XR, reassess.

## 2023-09-05 NOTE — CONSULT NOTE ADULT - SUBJECTIVE AND OBJECTIVE BOX
SEVERIANO MARTINEZ  [id number] 62yM   --------------------------------------  Patient is a 62y old  Male who presents with a chief complaint of     HPI:      PAST MEDICAL & SURGICAL HISTORY:  Stented coronary artery      Diabetes      AICD (automatic cardioverter/defibrillator) present      Hypertension      Heart failure with reduced ejection fraction      History of ischemic cardiomyopathy      History of COPD      H/O gastroesophageal reflux (GERD)      2019 novel coronavirus disease (COVID-19)      COVID-19 vaccine series completed      H/O vasectomy  20 yrs ago (2000)          MEDICATIONS  (STANDING):    MEDICATIONS  (PRN):      Allergies    ceftriaxone (Rash)  vancomycin (Rash (Mild to Mod))  Zosyn (Pruritus)  doxepin (Other)    Intolerances        --------------------------------------  VITALS:    Vital Signs Last 24 Hrs  T(C): 36.9 (05 Sep 2023 10:26), Max: 36.9 (05 Sep 2023 10:26)  T(F): 98.5 (05 Sep 2023 10:26), Max: 98.5 (05 Sep 2023 10:26)  HR: 89 (05 Sep 2023 10:26) (89 - 89)  BP: 109/70 (05 Sep 2023 10:26) (109/70 - 109/70)  BP(mean): --  RR: 17 (05 Sep 2023 10:26) (17 - 17)  SpO2: 98% (05 Sep 2023 10:26) (98% - 98%)    Parameters below as of 05 Sep 2023 10:26  Patient On (Oxygen Delivery Method): room air        --------------------------------------  LABS:                          12.2   10.44 )-----------( 227      ( 05 Sep 2023 13:13 )             40.6       09-05    140  |  100  |  36<H>  ----------------------------<  86  3.3<L>   |  22  |  1.70<H>    Ca    9.2      05 Sep 2023 13:13    TPro  7.6  /  Alb  3.5  /  TBili  0.8  /  DBili  x   /  AST  21  /  ALT  11  /  AlkPhos  136<H>  09-05      CAPILLARY BLOOD GLUCOSE          PT/INR - ( 05 Sep 2023 13:13 )   PT: 15.1 sec;   INR: 1.39 ratio         PTT - ( 05 Sep 2023 13:13 )  PTT:35.1 sec    LOWER EXTREMITY PHYSICAL EXAM:    Vascular: DP/PT 1/4 R, 0/4 DP PT , CFT < 3seconds B/L, Temperature gradient warm to warm, B/L.   Neuro: Epicritic sensation intact to the level of digits, B/L.  Musculoskeletal/Ortho: moderate edema and erythema to L foot and lower 1/3 tibfib L Lower Extremity,l s/p L foot Partial Third Ray Resection   Skin: bilateral foot no open wound, no acute signs of compartment, palpable dislocated bony structure to L foot dorsal midfoot, mildly  reduced 2nd digit dorsiflexion and plantarflexion, other digits ROM wnl.     RADIOLOGY & ADDITIONAL STUDIES:    ACC: [id number] EXAM:  XR ANKLE COMP MIN 3 VIEWS LT   ORDERED BY: YAHAIRA CARRENO     ACC: [id number] EXAM:  XR FOOT COMP MIN 3 VIEWS LT   ORDERED BY: YAHAIRA CARRENO     ACC: [id number] EXAM:  XR TIB FIB AP LAT 2 VIEWS LT   ORDERED BY: YAHAIRA CARRENO     PROCEDURE DATE:  09/05/2023          INTERPRETATION:  CLINICAL INFORMATION: Fracture evaluation.    COMPARISON: None available.    TECHNIQUE: 3 nonweightbearing views of the left foot, 3 views of the left   ankle, 2 views of the left tibia fibula    IMPRESSION:  Acute severely displaced, divergent type midfoot Lisfranc fracture   dislocation injury including dorsal medial dislocation of the medial   cuneiform and first ray at the naviculocuneiform and intercuneiform   joints. Small adjacent displacedosteochondral avulsion fracture arising   from the medial pole of the navicular remaining in continuity with the   medial cuneiform. Dorsolateral dislocation at the second through fifth   tarsometatarsal joints. Secondary pes planus.    Status post remote amputation of the third ray at the level of the   metatarsal distal shaft.  Small well-corticated chronic nonunited avulsion fragment versus   heterotopic ossification interposed between the tip of the lateral talar   process. Ankle joint spacesare maintained.    --- End of Report ---            AMANDA GARCIA MD; Attending Radiologist  This document has been electronically signed. Sep  5 2023  2:30PM       SEVERIANO MARTINEZ  [id number] 62yM   --------------------------------------  Patient is a 62y old  Male who presents with a chief complaint of     HPI:      PAST MEDICAL & SURGICAL HISTORY:  Stented coronary artery      Diabetes      AICD (automatic cardioverter/defibrillator) present      Hypertension      Heart failure with reduced ejection fraction      History of ischemic cardiomyopathy      History of COPD      H/O gastroesophageal reflux (GERD)      2019 novel coronavirus disease (COVID-19)      COVID-19 vaccine series completed      H/O vasectomy  20 yrs ago (2000)          MEDICATIONS  (STANDING):    MEDICATIONS  (PRN):      Allergies    ceftriaxone (Rash)  vancomycin (Rash (Mild to Mod))  Zosyn (Pruritus)  doxepin (Other)    Intolerances        --------------------------------------  VITALS:    Vital Signs Last 24 Hrs  T(C): 36.9 (05 Sep 2023 10:26), Max: 36.9 (05 Sep 2023 10:26)  T(F): 98.5 (05 Sep 2023 10:26), Max: 98.5 (05 Sep 2023 10:26)  HR: 89 (05 Sep 2023 10:26) (89 - 89)  BP: 109/70 (05 Sep 2023 10:26) (109/70 - 109/70)  BP(mean): --  RR: 17 (05 Sep 2023 10:26) (17 - 17)  SpO2: 98% (05 Sep 2023 10:26) (98% - 98%)    Parameters below as of 05 Sep 2023 10:26  Patient On (Oxygen Delivery Method): room air        --------------------------------------  LABS:                          12.2   10.44 )-----------( 227      ( 05 Sep 2023 13:13 )             40.6       09-05    140  |  100  |  36<H>  ----------------------------<  86  3.3<L>   |  22  |  1.70<H>    Ca    9.2      05 Sep 2023 13:13    TPro  7.6  /  Alb  3.5  /  TBili  0.8  /  DBili  x   /  AST  21  /  ALT  11  /  AlkPhos  136<H>  09-05      CAPILLARY BLOOD GLUCOSE          PT/INR - ( 05 Sep 2023 13:13 )   PT: 15.1 sec;   INR: 1.39 ratio         PTT - ( 05 Sep 2023 13:13 )  PTT:35.1 sec    LOWER EXTREMITY PHYSICAL EXAM:    Vascular: DP/PT 1/4 R, 0/4 DP PT , CFT < 3seconds B/L, Temperature gradient warm to warm, B/L.   Neuro: Epicritic sensation intact to the level of digits, B/L.  Musculoskeletal/Ortho: moderate edema and erythema to L foot and lower 1/3 tibfib L Lower Extremity, s/p L foot Partial Third Ray Resection   Skin: bilateral foot no acute signs of compartment, palpable dislocated bony structure to L foot dorsal midfoot, mildly  reduced 2nd digit dorsiflexion and plantarflexion, other digits ROM wnl.     RADIOLOGY & ADDITIONAL STUDIES:    ACC: [id number] EXAM:  XR ANKLE COMP MIN 3 VIEWS LT   ORDERED BY: YAHAIRA CARRENO     ACC: [id number] EXAM:  XR FOOT COMP MIN 3 VIEWS LT   ORDERED BY: YAHAIRA CARRENO     ACC: [id number] EXAM:  XR TIB FIB AP LAT 2 VIEWS LT   ORDERED BY: YAHAIRA CARRENO     PROCEDURE DATE:  09/05/2023          INTERPRETATION:  CLINICAL INFORMATION: Fracture evaluation.    COMPARISON: None available.    TECHNIQUE: 3 nonweightbearing views of the left foot, 3 views of the left   ankle, 2 views of the left tibia fibula    IMPRESSION:  Acute severely displaced, divergent type midfoot Lisfranc fracture   dislocation injury including dorsal medial dislocation of the medial   cuneiform and first ray at the naviculocuneiform and intercuneiform   joints. Small adjacent displacedosteochondral avulsion fracture arising   from the medial pole of the navicular remaining in continuity with the   medial cuneiform. Dorsolateral dislocation at the second through fifth   tarsometatarsal joints. Secondary pes planus.    Status post remote amputation of the third ray at the level of the   metatarsal distal shaft.  Small well-corticated chronic nonunited avulsion fragment versus   heterotopic ossification interposed between the tip of the lateral talar   process. Ankle joint spacesare maintained.    --- End of Report ---            AMANDA GARCIA MD; Attending Radiologist  This document has been electronically signed. Sep  5 2023  2:30PM       SEVERIANO MARTINEZ  [id number] 62yM   --------------------------------------    · Chief Complaint: The patient is a 62y Male complaining of lower leg pain/injury.  · HPI Objective Statement: 62M w/ hx HFrEF EF 29%, ICM, ICD, IDDM2, COPD, LUIS ALFREDO, CKD3-4 presenting with leg pain. Patient started having left lower extremity pain a couple days ago, was prescribed Levaquin without improvement.  Pain extends from foot to back of knee with associated calf swelling and mild erythema on lower leg.  No fever, chest pain, shortness of breath, abdominal pain, nausea, vomiting, diarrhea.        PAST MEDICAL & SURGICAL HISTORY:  Stented coronary artery      Diabetes      AICD (automatic cardioverter/defibrillator) present      Hypertension      Heart failure with reduced ejection fraction      History of ischemic cardiomyopathy      History of COPD      H/O gastroesophageal reflux (GERD)      2019 novel coronavirus disease (COVID-19)      COVID-19 vaccine series completed      H/O vasectomy  20 yrs ago (2000)          MEDICATIONS  (STANDING):    MEDICATIONS  (PRN):      Allergies    ceftriaxone (Rash)  vancomycin (Rash (Mild to Mod))  Zosyn (Pruritus)  doxepin (Other)    Intolerances        --------------------------------------  VITALS:    Vital Signs Last 24 Hrs  T(C): 36.9 (05 Sep 2023 10:26), Max: 36.9 (05 Sep 2023 10:26)  T(F): 98.5 (05 Sep 2023 10:26), Max: 98.5 (05 Sep 2023 10:26)  HR: 89 (05 Sep 2023 10:26) (89 - 89)  BP: 109/70 (05 Sep 2023 10:26) (109/70 - 109/70)  BP(mean): --  RR: 17 (05 Sep 2023 10:26) (17 - 17)  SpO2: 98% (05 Sep 2023 10:26) (98% - 98%)    Parameters below as of 05 Sep 2023 10:26  Patient On (Oxygen Delivery Method): room air        --------------------------------------  LABS:                          12.2   10.44 )-----------( 227      ( 05 Sep 2023 13:13 )             40.6       09-05    140  |  100  |  36<H>  ----------------------------<  86  3.3<L>   |  22  |  1.70<H>    Ca    9.2      05 Sep 2023 13:13    TPro  7.6  /  Alb  3.5  /  TBili  0.8  /  DBili  x   /  AST  21  /  ALT  11  /  AlkPhos  136<H>  09-05      CAPILLARY BLOOD GLUCOSE          PT/INR - ( 05 Sep 2023 13:13 )   PT: 15.1 sec;   INR: 1.39 ratio         PTT - ( 05 Sep 2023 13:13 )  PTT:35.1 sec    LOWER EXTREMITY PHYSICAL EXAM:    Vascular: DP/PT 1/4 R, 0/4 DP PT , CFT < 3seconds B/L, Temperature gradient warm to warm, B/L.   Neuro: Epicritic sensation intact to the level of digits, B/L.  Musculoskeletal/Ortho: moderate edema and erythema to L foot and lower 1/3 tibfib L Lower Extremity, s/p L foot Partial Third Ray Resection   Skin: bilateral foot no acute signs of compartment, palpable dislocated bony structure to L foot dorsal midfoot, mildly  reduced 2nd digit dorsiflexion and plantarflexion, other digits ROM wnl.     RADIOLOGY & ADDITIONAL STUDIES:    ACC: [id number] EXAM:  XR ANKLE COMP MIN 3 VIEWS LT   ORDERED BY: YAHAIRA CARRENO     ACC: [id number] EXAM:  XR FOOT COMP MIN 3 VIEWS LT   ORDERED BY: YAHAIRA CARRENO     ACC: [id number] EXAM:  XR TIB FIB AP LAT 2 VIEWS LT   ORDERED BY: YAHAIRA CARRENO     PROCEDURE DATE:  09/05/2023          INTERPRETATION:  CLINICAL INFORMATION: Fracture evaluation.    COMPARISON: None available.    TECHNIQUE: 3 nonweightbearing views of the left foot, 3 views of the left   ankle, 2 views of the left tibia fibula    IMPRESSION:  Acute severely displaced, divergent type midfoot Lisfranc fracture   dislocation injury including dorsal medial dislocation of the medial   cuneiform and first ray at the naviculocuneiform and intercuneiform   joints. Small adjacent displacedosteochondral avulsion fracture arising   from the medial pole of the navicular remaining in continuity with the   medial cuneiform. Dorsolateral dislocation at the second through fifth   tarsometatarsal joints. Secondary pes planus.    Status post remote amputation of the third ray at the level of the   metatarsal distal shaft.  Small well-corticated chronic nonunited avulsion fragment versus   heterotopic ossification interposed between the tip of the lateral talar   process. Ankle joint spacesare maintained.    --- End of Report ---            AMANDA GARCIA MD; Attending Radiologist  This document has been electronically signed. Sep  5 2023  2:30PM

## 2023-09-05 NOTE — ED PROVIDER NOTE - NSFOLLOWUPINSTRUCTIONS_ED_ALL_ED_FT
You were seen in the ER for leg pain. Your xray showed a foot dislocation for which podiatry applied a splint. Please do not bear weight on the foot. You can take tylenol and/or motrin for your pain. Please follow the instructions on the packaging.    A prescription for augmentin was sent to your pharmacy. Please take it as instructed.    Please follow up with Dr. Sigala from podiatry.  [phone]    Please follow up with your primary care doctor.    Please return to the ER if you have worsening symptoms including fever, chest pain, shortness of breath, abdominal pain, nausea, vomiting, diarrhea, weakness or lightheadedness/fainting.

## 2023-09-05 NOTE — ED ADULT TRIAGE NOTE - CHIEF COMPLAINT QUOTE
left leg pain, swelling x5 days, on Plavix sent by MD for r/o clot vs cellulitis, able to amb w/ pain

## 2023-09-05 NOTE — ED ADULT NURSE NOTE - CAS TRG GEN SKIN CONDITION
Warm Advancement-Rotation Flap Text: In order to maintain form and function of the airway, a spiral rotation flap was planned.  After prep and local anesthesia, an incision was made from the inferior, tangentially parallel to the alar rim, and then extended superiorly across the alar crease, vertically on the nasal sidewall, and laterally toward the cheek, or onto the cheek with a backcut.  The flap was undermined and after hemostasis was obtained, the flap was rotated down into place.  All wound edges were sutured in a layered fashion.

## 2023-09-05 NOTE — ED PROVIDER NOTE - PROGRESS NOTE DETAILS
Attending MD Addison: X-ray of left foot reveals Lisfranc dislocation.  Patient denies any major recent trauma so this is unusual.  He actually does not really have any shine tenderness of the left foot.  This could be a chronic finding.  Nonetheless we will consult podiatry for their assessment.  Will dose IV Ancef as he has received this in the hospital in June/2023 and tolerated that in spite of ceftriaxone reported allergy Ky Finnegan- spoke to podiatry, will dc with f/u with Dr. Sigala. will do non weight bearing. pt updated on plan, agrees.

## 2023-09-05 NOTE — ED PROVIDER NOTE - CARE PROVIDER_API CALL
Jeison Sigala  Podiatric Medicine and Surgery  [address], [address]  [place] [address]  Phone: [phone]  Fax: [phone]  Follow Up Time:

## 2023-09-05 NOTE — ED PROVIDER NOTE - PHYSICAL EXAMINATION
General appearance: NAD, conversant, afebrile    Eyes: anicteric sclerae, REMI, EOMI   HENT: Atraumatic; oropharynx clear, MMM and no ulcerations, no pharyngeal erythema or exudate   Neck: Trachea midline; Full range of motion, supple   Pulm: CTA bl, normal respiratory effort and no intercostal retractions, normal work of breathing   CV: RRR, No murmurs, rubs, or gallops.    Abdomen: Soft, non-tender, non-distended; no guarding or rebound   Extremities: L calf ttp, swelling, mild erythema. chronic appearing skin changes to ble   Skin: Dry, normal temperature, turgor and texture; no rash, ulcers or subcutaneous nodules   Psych: Appropriate affect, cooperative; alert and oriented to person, place and time

## 2023-09-05 NOTE — ED PROVIDER NOTE - PATIENT PORTAL LINK FT
You can access the FollowMyHealth Patient Portal offered by [facility] by registering at the following website: [email or web address]. By joining [facility]’s FollowMyHealth portal, you will also be able to view your health information using other applications (apps) compatible with our system.

## 2023-09-05 NOTE — ED ADULT NURSE NOTE - NSFALLUNIVINTERV_ED_ALL_ED
Bed/Stretcher in lowest position, wheels locked, appropriate side rails in place/Call bell, personal items and telephone in reach/Instruct patient to call for assistance before getting out of bed/chair/stretcher/Non-slip footwear applied when patient is off stretcher/[place] to call system/Physically safe environment - no spills, clutter or unnecessary equipment/Purposeful proactive rounding/Room/bathroom lighting operational, light cord in reach

## 2023-09-05 NOTE — ED PROVIDER NOTE - OBJECTIVE STATEMENT
62M w/ hx HFrEF EF 29%, ICM, ICD, IDDM2, COPD, LUIS ALFREDO, CKD3-4 presenting with leg pain. Patient started having left lower extremity pain a couple days ago, was prescribed Levaquin without improvement.  Pain extends from foot to back of knee with associated calf swelling and mild erythema on lower leg.  No fever, chest pain, shortness of breath, abdominal pain, nausea, vomiting, diarrhea.

## 2023-09-05 NOTE — ED PROVIDER NOTE - ATTENDING CONTRIBUTION TO CARE
Attending MD Addison:  I personally have seen and examined this patient. I have performed a substantive portion of the visit including all aspects of the medical decision making.  Resident note reviewed and agree on plan of care and except where noted.        62-year-old gentleman with a history of CHF CKD diabetes presenting for evaluation of atraumatic left anterior shin pain.  Has been on Levaquin for presumed cellulitis without improvement.  He was referred to ER for rule out DVT.    Vital signs nonactionable afebrile orally.  Examination of the left lower extremity reveals faint erythema and warmth to the distal one third of the left anterior shin without crepitus.  There is tenderness in this region.  DP pulse present in left foot.  No tenderness of the ankle or foot.  No increased work of breathing.    Plan for DVT ultrasound rule out DVT, x-rays of the foot ankle and shin, antibiotics for likely mild cellulitis and reassessment.      *The above represents an initial assessment/impression. Please refer to progress notes for potential changes in patient clinical course*

## 2023-09-05 NOTE — CONSULT NOTE ADULT - ASSESSMENT
62y Male with ____  - Patient seen and evaluated   - Vital stable.   - L foot xray: divergent type midfoot Lisfranc fracture, medial cuneiform and 1st ray dorsal and medical dislocation at the NC and intercuneiform joints, dorsal lateral dislocation at 2nd - 5th TMT joint.   - moderate edema and erythema to L foot and lower 1/3 tibfib L Lower Extremity,l s/p L foot Partial Third Ray Resection, bilateral foot no open wound, no acute signs of compartment, palpable dislocated bony structure to L foot dorsal midfoot, mildly  reduced 2nd digit dorsiflexion and plantarflexion, other digits ROM wnl.   - Attempting close reduction :   - Plan pending   62y Male with L foot Acute Charcot change  - Patient seen and evaluated   - Vital stable.   - L foot xray: divergent type midfoot Lisfranc fracture, medial cuneiform and 1st ray dorsal and medical dislocation at the NC and intercuneiform joints, dorsal lateral dislocation at 2nd - 5th TMT joint.   - Exam: moderate edema and erythema to L foot and lower 1/3 tibfib L Lower Extremity,  bilateral foot no acute signs of compartment, palpable dislocated bony structure to L foot dorsal midfoot, mildly  reduced 2nd digit dorsiflexion and plantarflexion, other digits ROM wnl. no open wound noted, no skin tenting.   - Lauren compression applied with Webril and Ace, followed by Posterior Splint. Patient tolerated well, neurovascular status of the L foot intact.  - Non-weight bearing to the L Lower Extremity   - Crutches training   - Extensive discussion held with patient regarding the severity of Acute Charcot changes and the importance and necessity of A1c control and non-weight bearing to L Lower Extremity for stabilize the L foot structure. Discussed with the pt that further immobilization with totcal contact cast, CROW boots, and surgical intervention such as Charcot foot recon might be indicated. With Charcot changes, ulceration, infection of soft tissue and bone of foot ankle and leg are all possible complications, limb loss concern remains. Pt demonstrated verbal under standing.   - Patient stable for discharge and follow up outpatient with Dr. Sigala within ONE week [phone]  - Will need tight Podiatry, Endocrinology follow up   - Discussed with Attending.

## 2023-09-18 ENCOUNTER — APPOINTMENT (OUTPATIENT)
Dept: WOUND CARE | Facility: CLINIC | Age: 63
End: 2023-09-18

## 2023-10-25 NOTE — PROVIDER CONTACT NOTE (OTHER) - REASON
Patient is discharged in stable condition, no acute distress. She is given her discharge papers, follow up care is reviewed, IV access removed, all questions answered. Patient is A&Ox4 and ambulatory with steady gait.    Pt refusing continuous pulse ox

## 2023-11-27 ENCOUNTER — APPOINTMENT (OUTPATIENT)
Dept: ELECTROPHYSIOLOGY | Facility: CLINIC | Age: 63
End: 2023-11-27

## 2023-12-05 NOTE — ED ADULT NURSE NOTE - HOW OFTEN DO YOU HAVE SIX OR MORE DRINKS ON ONE OCCASION?
Chief Complaint   Patient presents with    Procedure     Botox     Lidia Gutiérrez MA on 12/5/2023 at 9:59 AM     Less than Monthly

## 2024-01-08 ENCOUNTER — APPOINTMENT (OUTPATIENT)
Dept: ELECTROPHYSIOLOGY | Facility: CLINIC | Age: 64
End: 2024-01-08

## 2024-02-08 ENCOUNTER — APPOINTMENT (OUTPATIENT)
Dept: ELECTROPHYSIOLOGY | Facility: CLINIC | Age: 64
End: 2024-02-08

## 2024-02-29 NOTE — PROGRESS NOTE ADULT - PROBLEM/PLAN-2
DISPLAY PLAN FREE TEXT
[Negative] : Heme/Lymph
DISPLAY PLAN FREE TEXT

## 2024-03-06 ENCOUNTER — APPOINTMENT (OUTPATIENT)
Dept: ELECTROPHYSIOLOGY | Facility: CLINIC | Age: 64
End: 2024-03-06
Payer: MEDICARE

## 2024-03-06 ENCOUNTER — NON-APPOINTMENT (OUTPATIENT)
Age: 64
End: 2024-03-06

## 2024-03-06 PROCEDURE — 93295 DEV INTERROG REMOTE 1/2/MLT: CPT

## 2024-03-06 PROCEDURE — 93296 REM INTERROG EVL PM/IDS: CPT

## 2024-03-22 NOTE — PROGRESS NOTE ADULT - PROVIDER SPECIALTY LIST ADULT
Endocrinology Application Tool (Optional): Liquid Nitrogen Sprayer Duration Of Freeze Thaw-Cycle (Seconds): 3 Render Note In Bullet Format When Appropriate: No Detail Level: Detailed Post-Care Instructions: I reviewed with the patient in detail post-care instructions. Patient is to wear sun protection, and avoid picking at any of the treated lesions. Patient may apply Polysporin or Mupirocin to crusted or scabbing areas. Show Aperture Variable?: Yes Number Of Freeze-Thaw Cycles: 2 freeze-thaw cycles Aperture Size (Optional): C Consent: The patient's consent was obtained including but not limited to risks of crusting, scabbing, blistering, scarring, darker or lighter pigmentary change, recurrence, incomplete removal and infection. Medical Necessity Information: It is in your best interest to select a reason for this procedure from the list below. All of these items fulfill various CMS LCD requirements except the new and changing color options. Spray Paint Text: The liquid nitrogen was applied to the skin utilizing a spray paint frosting technique. Medical Necessity Clause: This procedure was medically necessary because the lesions that were treated were: Detail Level: Simple Post-Care Instructions: I reviewed with the patient in detail post-care instructions. Patient is to wear sunprotection, and avoid picking at any of the treated lesions. Patient may apply Polysporin or Mupirocin to crusted or scabbing areas.

## 2024-04-30 NOTE — ED ADULT TRIAGE NOTE - BSA (M2)
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 Hargrove), and now S/P total pancreatectomy  with islet cell autotransplant at [facility] by Dr. Gil in 04/2018).  Patient admitted with AMS, found to be in liver failure/hepatic encephalopathy and imaging shows colitis.  TPN consulted given severe protein calorie malnutrition and allowing possibility of bowel rest.  Patient with history of TPN use 2 years ago.      - Nutritional Assessment, patient with severe protein calorie malnutrition not fully meeting nutritional goals enterally due to colitis/decreased enteral intake and may benefit from TPN for nutritional support.  - TPN plan: Patient declining TPN at present; will continue to follow  - TPN access:  Patient will need a dedicated central line if/once TPN planned to start   - Recommend checking baseline nutrition parameters:  TSH, Prealbumin, Lipids, HgA1c, iCal, Mag, Phos  - Electrolyte Imbalance risk:  check CMP, Mg, Phos and ionized Ca daily, to be supplemented peripherally per primary team.  - Recommend strict intake and output/weight checks three times a week  - Risk of glucose dysfunction with TPN:  HgA1c 5.2% ; would need to initiate fingersticks every 6 hours to monitor glucose trend once TPN starts  - Risk for Hypertriglyceridemia with TPN:  TG 82 mg/dL from 4/13; plan to monitor TG closely once/if TPN starts  - Further care per primary team    Available on TEAMS  TPN spectra [id number] ([phone] when dialing from outside line)  M-F 8A-2P, Weekends and holidays 8/9A-12/1P  Discussed with Dr. Caputo 2.52

## 2024-06-05 ENCOUNTER — NON-APPOINTMENT (OUTPATIENT)
Age: 64
End: 2024-06-05

## 2024-06-05 ENCOUNTER — APPOINTMENT (OUTPATIENT)
Dept: ELECTROPHYSIOLOGY | Facility: CLINIC | Age: 64
End: 2024-06-05
Payer: MEDICARE

## 2024-06-05 PROCEDURE — 93295 DEV INTERROG REMOTE 1/2/MLT: CPT

## 2024-06-05 PROCEDURE — 93296 REM INTERROG EVL PM/IDS: CPT

## 2024-06-18 NOTE — ED ADULT NURSE NOTE - CHIEF COMPLAINT QUOTE
Patient did not reschedule with Dr. Rader  Received: Today  Merle Tello Sunil, MD; [facility] Nurse Msg Pool  This patient was scheduled on 07-03 and was cancelled. I called the patients cell, twice in a row and there was no answer and the mailbox was not set-up. I was not able to leave the patient a message. A reminder letter has been mailed. As of today, no response has been made back to us by the patient.    Veronique   diff breathing since yesterday

## 2024-07-03 NOTE — PROGRESS NOTE ADULT - SUBJECTIVE AND OBJECTIVE BOX
Quality 226: Preventive Care And Screening: Tobacco Use: Screening And Cessation Intervention: Patient screened for tobacco use and is an ex/non-smoker Detail Level: Detailed SEVERIANO MARTINEZ 61y [id number]    Patient is a 61y old  Male who presents with a chief complaint of foot ulcer (2022 13:03)      Follow Up/CC:  ID following for foot cellulitis     Interval History/ROS: no fever, for OR tomorrow     Allergies    No Known Allergies    Intolerances        ANTIMICROBIALS:  ampicillin/sulbactam  IVPB 3 every 8 hours      MEDICATIONS  (STANDING):  ampicillin/sulbactam  IVPB 3 Gram(s) IV Intermittent every 8 hours  aspirin  chewable 81 milliGRAM(s) Oral daily  atorvastatin 80 milliGRAM(s) Oral at bedtime  clopidogrel Tablet 75 milliGRAM(s) Oral daily  dextrose 40% Gel 15 Gram(s) Oral once  dextrose 5%. 1000 milliLiter(s) (50 mL/Hr) IV Continuous <Continuous>  dextrose 5%. 1000 milliLiter(s) (100 mL/Hr) IV Continuous <Continuous>  dextrose 50% Injectable 25 Gram(s) IV Push once  dextrose 50% Injectable 12.5 Gram(s) IV Push once  dextrose 50% Injectable 25 Gram(s) IV Push once  enoxaparin Injectable 30 milliGRAM(s) SubCutaneous daily  glucagon  Injectable 1 milliGRAM(s) IntraMuscular once  influenza   Vaccine 0.5 milliLiter(s) IntraMuscular once  insulin glargine Injectable (LANTUS) 60 Unit(s) SubCutaneous at bedtime  insulin lispro (ADMELOG) corrective regimen sliding scale   SubCutaneous three times a day before meals  insulin lispro (ADMELOG) corrective regimen sliding scale   SubCutaneous at bedtime  insulin lispro Injectable (ADMELOG) 11 Unit(s) SubCutaneous three times a day before meals  mupirocin 2% Ointment 1 Application(s) Topical two times a day  pantoprazole    Tablet 40 milliGRAM(s) Oral before breakfast  tamsulosin 0.4 milliGRAM(s) Oral at bedtime    MEDICATIONS  (PRN):  acetaminophen     Tablet .. 650 milliGRAM(s) Oral every 6 hours PRN Temp greater or equal to 38C (100.4F), Mild Pain (1 - 3)  ALPRAZolam 0.25 milliGRAM(s) Oral two times a day PRN anxiety  aluminum hydroxide/magnesium hydroxide/simethicone Suspension 30 milliLiter(s) Oral every 4 hours PRN Dyspepsia  melatonin 3 milliGRAM(s) Oral at bedtime PRN Insomnia  ondansetron Injectable 4 milliGRAM(s) IV Push every 8 hours PRN Nausea and/or Vomiting        Vital Signs Last 24 Hrs  T(C): 37.2 (2022 14:52), Max: 37.3 (2022 21:28)  T(F): 99 (2022 14:52), Max: 99.2 (2022 21:28)  HR: 93 (2022 14:52) (93 - 108)  BP: 93/53 (2022 14:52) (93/53 - 136/71)  BP(mean): --  RR: 18 (2022 14:52) (17 - 18)  SpO2: 93% (2022 14:52) (93% - 97%)    CBC Full  -  ( 2022 06:52 )  WBC Count : 10.55 K/uL  RBC Count : 4.58 M/uL  Hemoglobin : 11.0 g/dL  Hematocrit : 34.2 %  Platelet Count - Automated : 230 K/uL  Mean Cell Volume : 74.7 fl  Mean Cell Hemoglobin : 24.0 pg  Mean Cell Hemoglobin Concentration : 32.2 gm/dL  Auto Neutrophil # : 8.35 K/uL  Auto Lymphocyte # : 0.84 K/uL  Auto Monocyte # : 1.03 K/uL  Auto Eosinophil # : 0.18 K/uL  Auto Basophil # : 0.03 K/uL  Auto Neutrophil % : 79.1 %  Auto Lymphocyte % : 8.0 %  Auto Monocyte % : 9.8 %  Auto Eosinophil % : 1.7 %  Auto Basophil % : 0.3 %    -20    133<L>  |  94<L>  |  72<H>  ----------------------------<  203<H>  3.4<L>   |  22  |  2.07<H>    Ca    9.3      2022 06:52        Urinalysis Basic - ( 2022 06:25 )    Color: Light Yellow / Appearance: Clear / S.014 / pH: x  Gluc: x / Ketone: Negative  / Bili: Negative / Urobili: Negative   Blood: x / Protein: 30 mg/dL / Nitrite: Negative   Leuk Esterase: Negative / RBC: 2 /hpf / WBC 1 /HPF   Sq Epi: x / Non Sq Epi: 0 /hpf / Bacteria: Negative        MICROBIOLOGY:  .Abscess left foot wound culture  22   Numerous Alpha hemolytic strep "Susceptibilities not performed"  Rare Enterococcus faecalis  Few Bacteroides thetaiotamcron group "Susceptibilities not performed"  --  Enterococcus faecalis      .Blood Blood-Peripheral  22   No growth to date.  --  --        RADIOLOGY    < from: MR Foot w/wo IV Cont, Left (22 @ 17:06) >  Plantar wound at the level of the third metatarsal head with   osteomyelitis within the metatarsal head as well as in the proximal   phalanx of the third digit.    < end of copied text >

## 2024-08-24 ENCOUNTER — INPATIENT (INPATIENT)
Facility: HOSPITAL | Age: 64
LOS: 15 days | Discharge: HOME HEALTH | DRG: 293 | End: 2024-09-09
Attending: INTERNAL MEDICINE | Admitting: HOSPITALIST
Payer: MEDICARE

## 2024-08-24 VITALS
WEIGHT: 263.89 LBS | DIASTOLIC BLOOD PRESSURE: 69 MMHG | HEART RATE: 92 BPM | RESPIRATION RATE: 17 BRPM | HEIGHT: 77 IN | OXYGEN SATURATION: 95 % | SYSTOLIC BLOOD PRESSURE: 107 MMHG | TEMPERATURE: 98 F

## 2024-08-24 DIAGNOSIS — I50.23 ACUTE ON CHRONIC SYSTOLIC (CONGESTIVE) HEART FAILURE: ICD-10-CM

## 2024-08-24 DIAGNOSIS — Z98.52 VASECTOMY STATUS: Chronic | ICD-10-CM

## 2024-08-24 LAB
ALBUMIN SERPL ELPH-MCNC: 3.6 G/DL — SIGNIFICANT CHANGE UP (ref 3.3–5)
ALP SERPL-CCNC: 115 U/L — SIGNIFICANT CHANGE UP (ref 40–120)
ALT FLD-CCNC: 8 U/L — LOW (ref 10–45)
ANION GAP SERPL CALC-SCNC: 11 MMOL/L — SIGNIFICANT CHANGE UP (ref 5–17)
ANISOCYTOSIS BLD QL: SIGNIFICANT CHANGE UP
AST SERPL-CCNC: 18 U/L — SIGNIFICANT CHANGE UP (ref 10–40)
BASOPHILS # BLD AUTO: 0 K/UL — SIGNIFICANT CHANGE UP (ref 0–0.2)
BASOPHILS NFR BLD AUTO: 0 % — SIGNIFICANT CHANGE UP (ref 0–2)
BILIRUB SERPL-MCNC: 0.6 MG/DL — SIGNIFICANT CHANGE UP (ref 0.2–1.2)
BUN SERPL-MCNC: 26 MG/DL — HIGH (ref 7–23)
BURR CELLS BLD QL SMEAR: PRESENT — SIGNIFICANT CHANGE UP
CALCIUM SERPL-MCNC: 8.7 MG/DL — SIGNIFICANT CHANGE UP (ref 8.4–10.5)
CHLORIDE SERPL-SCNC: 104 MMOL/L — SIGNIFICANT CHANGE UP (ref 96–108)
CO2 SERPL-SCNC: 24 MMOL/L — SIGNIFICANT CHANGE UP (ref 22–31)
CREAT SERPL-MCNC: 1.66 MG/DL — HIGH (ref 0.5–1.3)
DACRYOCYTES BLD QL SMEAR: SLIGHT — SIGNIFICANT CHANGE UP
EGFR: 47 ML/MIN/1.73M2 — LOW
ELLIPTOCYTES BLD QL SMEAR: SLIGHT — SIGNIFICANT CHANGE UP
EOSINOPHIL # BLD AUTO: 0.06 K/UL — SIGNIFICANT CHANGE UP (ref 0–0.5)
EOSINOPHIL NFR BLD AUTO: 0.9 % — SIGNIFICANT CHANGE UP (ref 0–6)
GLUCOSE SERPL-MCNC: 146 MG/DL — HIGH (ref 70–99)
HCT VFR BLD CALC: 35.1 % — LOW (ref 39–50)
HGB BLD-MCNC: 9.3 G/DL — LOW (ref 13–17)
HYPOCHROMIA BLD QL: SLIGHT — SIGNIFICANT CHANGE UP
LYMPHOCYTES # BLD AUTO: 0.56 K/UL — LOW (ref 1–3.3)
LYMPHOCYTES # BLD AUTO: 7.8 % — LOW (ref 13–44)
MACROCYTES BLD QL: SLIGHT — SIGNIFICANT CHANGE UP
MANUAL SMEAR VERIFICATION: SIGNIFICANT CHANGE UP
MCHC RBC-ENTMCNC: 19.6 PG — LOW (ref 27–34)
MCHC RBC-ENTMCNC: 26.5 GM/DL — LOW (ref 32–36)
MCV RBC AUTO: 73.9 FL — LOW (ref 80–100)
MICROCYTES BLD QL: SIGNIFICANT CHANGE UP
MONOCYTES # BLD AUTO: 0.25 K/UL — SIGNIFICANT CHANGE UP (ref 0–0.9)
MONOCYTES NFR BLD AUTO: 3.5 % — SIGNIFICANT CHANGE UP (ref 2–14)
NEUTROPHILS # BLD AUTO: 6.33 K/UL — SIGNIFICANT CHANGE UP (ref 1.8–7.4)
NEUTROPHILS NFR BLD AUTO: 87.8 % — HIGH (ref 43–77)
NT-PROBNP SERPL-SCNC: 3255 PG/ML — HIGH (ref 0–300)
OVALOCYTES BLD QL SMEAR: SLIGHT — SIGNIFICANT CHANGE UP
PLAT MORPH BLD: ABNORMAL
PLATELET # BLD AUTO: 147 K/UL — LOW (ref 150–400)
POIKILOCYTOSIS BLD QL AUTO: SIGNIFICANT CHANGE UP
POLYCHROMASIA BLD QL SMEAR: SLIGHT — SIGNIFICANT CHANGE UP
POTASSIUM SERPL-MCNC: 4.6 MMOL/L — SIGNIFICANT CHANGE UP (ref 3.5–5.3)
POTASSIUM SERPL-SCNC: 4.6 MMOL/L — SIGNIFICANT CHANGE UP (ref 3.5–5.3)
PROT SERPL-MCNC: 7.2 G/DL — SIGNIFICANT CHANGE UP (ref 6–8.3)
RBC # BLD: 4.75 M/UL — SIGNIFICANT CHANGE UP (ref 4.2–5.8)
RBC # FLD: 20.7 % — HIGH (ref 10.3–14.5)
RBC BLD AUTO: ABNORMAL
SODIUM SERPL-SCNC: 139 MMOL/L — SIGNIFICANT CHANGE UP (ref 135–145)
TROPONIN T, HIGH SENSITIVITY RESULT: 74 NG/L — HIGH (ref 0–51)
WBC # BLD: 7.21 K/UL — SIGNIFICANT CHANGE UP (ref 3.8–10.5)
WBC # FLD AUTO: 7.21 K/UL — SIGNIFICANT CHANGE UP (ref 3.8–10.5)

## 2024-08-24 PROCEDURE — 99285 EMERGENCY DEPT VISIT HI MDM: CPT | Mod: GC

## 2024-08-24 PROCEDURE — 71046 X-RAY EXAM CHEST 2 VIEWS: CPT | Mod: 26

## 2024-08-24 RX ORDER — BUMETANIDE 2 MG/1
2 TABLET ORAL ONCE
Refills: 0 | Status: DISCONTINUED | OUTPATIENT
Start: 2024-08-24 | End: 2024-08-24

## 2024-08-24 RX ORDER — ASPIRIN 81 MG
324 TABLET, DELAYED RELEASE (ENTERIC COATED) ORAL ONCE
Refills: 0 | Status: COMPLETED | OUTPATIENT
Start: 2024-08-24 | End: 2024-08-24

## 2024-08-24 RX ORDER — BUMETANIDE 2 MG/1
2 TABLET ORAL ONCE
Refills: 0 | Status: COMPLETED | OUTPATIENT
Start: 2024-08-24 | End: 2024-08-24

## 2024-08-24 RX ADMIN — Medication 324 MILLIGRAM(S): at 22:55

## 2024-08-24 RX ADMIN — BUMETANIDE 2 MILLIGRAM(S): 2 TABLET ORAL at 22:01

## 2024-08-24 NOTE — ED PROVIDER NOTE - PROGRESS NOTE DETAILS
Kika Rosales PGY-1:  cards fellow paged. they will come see the pt Kika Rosales PGY-1:  cards interrogated the device. inappropriate shock was delivered. as pt is fluid overloaded will continue to work up for CHF exacerbation and most likely admit. Kika Rosales PGY-1:  bnp 3255, trop 77, repeat trop 74. diuresis started. wife at bedside reports pt has only been taking 1mg bumex daily for the past few weeks. will create request for admission for chf exacerbation

## 2024-08-24 NOTE — ED PROVIDER NOTE - CARE PLAN
Principal Discharge DX:	Acute on chronic systolic congestive heart failure  Secondary Diagnosis:	NSTEMI (non-ST elevation myocardial infarction)   1

## 2024-08-24 NOTE — PROCEDURE NOTE - ADDITIONAL PROCEDURE DETAILS
Battery life: 52%    Stored data revealed:  1x episode of ICD shock on 8/24 at 5:29pm, strip showing normal ventricular rhythm w/ oversensing by device leading to inappropriate ICD shock    Lead impedence 40, wnl    Primary and secondary vectors sensing were wnl; alternative vector was oversensing     Changes made:  Switched sensing from primary vector to secondary vector

## 2024-08-24 NOTE — ED ADULT NURSE NOTE - NSFALLRISKINTERV_ED_ALL_ED

## 2024-08-24 NOTE — CONSULT NOTE ADULT - SUBJECTIVE AND OBJECTIVE BOX
Cardiology Consult Note   [Please check [email or web address] password: [email or web address]" for cardiology service schedule and contact information]    History of Present Illness:   Mr. Jessica is a 64yo man w/ hx of iCMY w/ HFrEF 30% s/p [employer]i S-ICD, CKD 3, HTN, T2DM, COPD, LUIS ALFREDO, and LLE amputee who presented after ICD shock.    Pt reports he's had issues w/ chest congestion, LE swelling and dyspnea for the past few months, he was on varying doses of bumex at home, recently increased to 2mg BID about 2w ago. He was doing well otherwise w/o chest pain, palp, dizziness, lightheadedness. He was transferring from his wheelchair to his bed when he suddenly felt an ICD shock prompting him to present to the ED.    In the ED he was HDS w/o complaints. EKG showed NSR w/ RBBB, non ischemic. Interrogation showed an inappropriate ICD shock this evening at 5:30pm for device oversensing (see procedure note for full interrogation). Pt reports no prior ICD shocks in his life.    PMHx: reviewed, see below  SOCIAL HISTORY:  unchanged    MEDICATIONS:    REVIEW OF SYSTEMS:  ROS as above, otherwise neg  -------------------------------------------------------------------------------------------  VITALS:  T(C): 36.9 (08-24-24 @ 19:15), Max: 36.9 (08-24-24 @ 18:54)  HR: 76 (08-24-24 @ 19:15) (76 - 92)  BP: 94/66 (08-24-24 @ 19:15) (94/66 - 107/69)  RR: 18 (08-24-24 @ 19:15) (17 - 18)  SpO2: 100% (08-24-24 @ 19:15) (95% - 100%)  Wt(kg): --  I&O's Summary      PHYSICAL EXAM:  GEN: NAD, sitting in bed  HEENT: NCAT, EOMI  CV: RRR, Ns1/s2, no m/r/g, JVP not elevated  RESP: faint rales  ABD: soft, nt, nd  EXT: warm, 2+ pulses, 2+ LE to the mid shin of the RLE, 1+ of the L stump  SKIN: no visible lesions, rashes  NEURO: AAOx3, no focal deficits  PSYCH: Calm, cooperative    -------------------------------------------------------------------------------------------  LABS:                          9.3    7.21  )-----------( 147      ( 24 Aug 2024 20:03 )             35.1     08-24    139  |  104  |  26<H>  ----------------------------<  146<H>  4.6   |  24  |  1.66<H>    Ca    8.7      24 Aug 2024 20:03  Mg     2.5     08-24    TPro  7.2  /  Alb  3.6  /  TBili  0.6  /  DBili  x   /  AST  18  /  ALT  8<L>  /  AlkPhos  115  08-24      CARDIAC MARKERS ( 24 Aug 2024 20:03 )  77 ng/L / x     / x     / x     / x     / x                -------------------------------------------------------------------------------------------  Meds:    -------------------------------------------------------------------------------------------  Cardiovascular Diagnostic Testing:      -------------------------------------------------------------------------------------------  Assessment and Plan:   Mr. Jessica is a 64yo man w/ xh of HFrEF s/p [employer]i S-ICD, HTN, T2DM and LLE amputee who presented after ICD shock found to have been shocked inappropriately for oversensing.    #ICD Shock  Pt w/o any hx of VT. S-ICD interrogation revealed an ICD shock on 8/24 at 5:30pm for oversensing of NSR. Pt w/o any prior shocks by his device in since it was implanted in 2020 and there were no other events recorded. Lead impedence is wnl, so unclear why suddenly this became an issue. Switched the sensing vector to secondary (although primary was sensing well per testing).    Recommendations:  - No indication for new medical tx from EP perspective  - Agree w/ diuresis  - Continue home metop succ 100mg BID    *** Recommendations are preliminary until cosigned by the attending.    Aquiles Summers MD  Cardiology Fellow    For all New Consults and Questions:  [email or web address]   Login: [email or web address]

## 2024-08-24 NOTE — ED ADULT NURSE NOTE - OBJECTIVE STATEMENT
64 y/o M PMHx MI s/p AICD, DM, HTN, COPD, ischemic cardiomyopathy, HF with reduced ejection fraction, right BKA BIB EMS presents to the ED w/ AICD shock prior to ED arrival. Pt reports he was transferring himself from the wheelchair to the bed when he felt a sudden shock from his chest. Pt also reports he has been feeling significantly more short of breath the past few days. Wife at bedside reports that pt's abdomen has been more distended than usual. Pt otherwise denies chest pain, dizziness, headache, fever, chills, n/v/d. Pt is A&OX4, awake and alert, answering questions appropriately, breathing spontaneous. Crackles noted to B/L lungs on auscultation. Pt dressed in gown, IV access obtained by EMS. Pt placed on cardiac monitor. Stretcher locked in place at lowest position with side rails up. Call bell within reach.

## 2024-08-24 NOTE — ED PROVIDER NOTE - DATE/TIME 1
Good Evening,                  [facility] received a prescription for MIRENA. Upon calling the patient's insurance company, we have been told that IUDs are excluded under the patient's pharmacy benefits. [facility] is unable to bill medical claims for medications.                   The medication itself, and the administration of the medication, will both have to be billed under the medical benefit and may require a prior authorization. Please contact [facility] with any questions at [phone].                 Thank you,                                                Mague Brewster [facility]                                          Patient Care Advocate                                           [facility]   24-Aug-2024 19:42

## 2024-08-24 NOTE — ED PROVIDER NOTE - PHYSICAL EXAMINATION
General: alert, oriented to person, time, place  Psych: mood appropriate  Head: normocephalic; atraumatic  Eyes: PERRLA, EOMI, conjunctivae clear bilaterally, sclerae anicteric  ENT: no nasal flaring, patent nares  Cardio: RRR, no m/r/g, pulses 2+ b/l  Resp: crackles in lower lobes bilaterally  GI: soft/nondistended/nontender  Neuro: normal sensation  Skin: No evidence of rash or bruising  MSK: normal movement of all extremities, L BKA   Lymph/Vasc: R lower extremity edema

## 2024-08-24 NOTE — ED PROVIDER NOTE - CLINICAL SUMMARY MEDICAL DECISION MAKING FREE TEXT BOX
63yM     EKG shows normal sinus rhythm with new Right bundle branch block 63yM presenting after his icd delivered a shock. no chest pain prior to the shock and he was at home moving from his wheelchair to his bed when it happened.   On physical exam he presents with signs of volume overload. Will workup for CHF exacerbation and ACS including CBC, CMP, troponin, bnp, cxr  EKG shows normal sinus rhythm with new Right bundle branch block. Will get in contact with cards fellow for device interrogation.

## 2024-08-24 NOTE — ED PROVIDER NOTE - ATTENDING APP SHARED VISIT CONTRIBUTION OF CARE
63yM PMH CHF, DM, HTN, CAD s/p AICD, left BKA, presents to the emergency department after feeling his AICD shocked him.  Patient says he was at home moving from his wheelchair to his bed and felt the shock, Had no symptoms at that time. However, patient does note that he has been feeling increased shortness of breath over the past few days and significant dyspnea on exertion.  Admits to taking Bumex 1 mg daily although he supposed to be taking 2 mg. Denies fever, chills, chest pain, shortness of breath, abdominal pain, n/v/d, numbness, weakness, tingling, headache. His EP is Dr. Rodríguez.    Physical Exam:  Gen: NAD, awake and alert, non-toxic appearing  HEENT: Atraumatic, oropharynx clear, moist mucous membranes, normal conjunctiva  Cardio: RRR, no murmurs, rubs or gallops  Lung: Bilateral lower lobe rales, right greater than left.  No wheezing.  Abd: soft, NT, ND, no guarding, no rigidity, no rebound tenderness  MSK: no visible deformities, ROMx4   Neuro: No focal sensory or motor deficits  Skin: Warm, well perfused, 2+ pitting edema in right lower extremity with erythema, no warmth. left below-knee amputation.    Patient presents status post AICD shock.  Patient will need to have AICD interrogated.  Concern for arrhythmia,  ACS versus acute on chronic CHF exacerbation. Patient is hemodynamically stable. Will obtain CBC, CMP, troponin, BNP, chest x-ray, EKG. EKG reassuring for STEMI however there is a new right bundle branch block not seen on EKG in 2023. Patient will be in need to be admitted for further cardiac evaluation.  Patient and wife at bedside agree with plan, all questions answered.

## 2024-08-24 NOTE — ED PROVIDER NOTE - OBJECTIVE STATEMENT
63yM PMH CHF, DM, HTN, presenting after his AICD went off. He was at home moving from his wheelchair to his bed when he felt 1 shock about 30 minutes prior to arrival. He immediately called EMS. He states he has been feeling more short of breath the past few days and has significant dyspnea on exertion as well as orthopnea. He denies chest pain besides when the shock occurred. No fevers/chills, abdominal pain, nausea, vomiting. Has R lower extremity edema. His EP is Dr. Rodríguez

## 2024-08-25 DIAGNOSIS — G89.29 OTHER CHRONIC PAIN: ICD-10-CM

## 2024-08-25 DIAGNOSIS — Z29.9 ENCOUNTER FOR PROPHYLACTIC MEASURES, UNSPECIFIED: ICD-10-CM

## 2024-08-25 DIAGNOSIS — I50.9 HEART FAILURE, UNSPECIFIED: ICD-10-CM

## 2024-08-25 DIAGNOSIS — N18.9 CHRONIC KIDNEY DISEASE, UNSPECIFIED: ICD-10-CM

## 2024-08-25 DIAGNOSIS — R04.0 EPISTAXIS: ICD-10-CM

## 2024-08-25 DIAGNOSIS — E11.9 TYPE 2 DIABETES MELLITUS WITHOUT COMPLICATIONS: ICD-10-CM

## 2024-08-25 DIAGNOSIS — I25.10 ATHEROSCLEROTIC HEART DISEASE OF NATIVE CORONARY ARTERY WITHOUT ANGINA PECTORIS: ICD-10-CM

## 2024-08-25 LAB
A1C WITH ESTIMATED AVERAGE GLUCOSE RESULT: 7.5 % — HIGH (ref 4–5.6)
ANION GAP SERPL CALC-SCNC: 16 MMOL/L — SIGNIFICANT CHANGE UP (ref 5–17)
BUN SERPL-MCNC: 29 MG/DL — HIGH (ref 7–23)
CALCIUM SERPL-MCNC: 8.7 MG/DL — SIGNIFICANT CHANGE UP (ref 8.4–10.5)
CHLORIDE SERPL-SCNC: 103 MMOL/L — SIGNIFICANT CHANGE UP (ref 96–108)
CO2 SERPL-SCNC: 22 MMOL/L — SIGNIFICANT CHANGE UP (ref 22–31)
CREAT SERPL-MCNC: 1.67 MG/DL — HIGH (ref 0.5–1.3)
EGFR: 46 ML/MIN/1.73M2 — LOW
ESTIMATED AVERAGE GLUCOSE: 169 MG/DL — HIGH (ref 68–114)
GLUCOSE BLDC GLUCOMTR-MCNC: 124 MG/DL — HIGH (ref 70–99)
GLUCOSE BLDC GLUCOMTR-MCNC: 127 MG/DL — HIGH (ref 70–99)
GLUCOSE BLDC GLUCOMTR-MCNC: 127 MG/DL — HIGH (ref 70–99)
GLUCOSE BLDC GLUCOMTR-MCNC: 160 MG/DL — HIGH (ref 70–99)
GLUCOSE BLDC GLUCOMTR-MCNC: 170 MG/DL — HIGH (ref 70–99)
GLUCOSE SERPL-MCNC: 144 MG/DL — HIGH (ref 70–99)
HCT VFR BLD CALC: 34.9 % — LOW (ref 39–50)
HCT VFR BLD CALC: 35.1 % — LOW (ref 39–50)
HGB BLD-MCNC: 9.4 G/DL — LOW (ref 13–17)
HGB BLD-MCNC: 9.5 G/DL — LOW (ref 13–17)
MAGNESIUM SERPL-MCNC: 2.4 MG/DL — SIGNIFICANT CHANGE UP (ref 1.6–2.6)
MCHC RBC-ENTMCNC: 19.7 PG — LOW (ref 27–34)
MCHC RBC-ENTMCNC: 19.9 PG — LOW (ref 27–34)
MCHC RBC-ENTMCNC: 26.8 GM/DL — LOW (ref 32–36)
MCHC RBC-ENTMCNC: 27.2 GM/DL — LOW (ref 32–36)
MCV RBC AUTO: 73.2 FL — LOW (ref 80–100)
MCV RBC AUTO: 73.7 FL — LOW (ref 80–100)
NRBC # BLD: 0 /100 WBCS — SIGNIFICANT CHANGE UP (ref 0–0)
NRBC # BLD: 0 /100 WBCS — SIGNIFICANT CHANGE UP (ref 0–0)
PHOSPHATE SERPL-MCNC: 3.6 MG/DL — SIGNIFICANT CHANGE UP (ref 2.5–4.5)
PLATELET # BLD AUTO: 145 K/UL — LOW (ref 150–400)
PLATELET # BLD AUTO: 150 K/UL — SIGNIFICANT CHANGE UP (ref 150–400)
POTASSIUM SERPL-MCNC: 4.2 MMOL/L — SIGNIFICANT CHANGE UP (ref 3.5–5.3)
POTASSIUM SERPL-SCNC: 4.2 MMOL/L — SIGNIFICANT CHANGE UP (ref 3.5–5.3)
RBC # BLD: 4.76 M/UL — SIGNIFICANT CHANGE UP (ref 4.2–5.8)
RBC # BLD: 4.77 M/UL — SIGNIFICANT CHANGE UP (ref 4.2–5.8)
RBC # FLD: 20.5 % — HIGH (ref 10.3–14.5)
RBC # FLD: 20.8 % — HIGH (ref 10.3–14.5)
SODIUM SERPL-SCNC: 141 MMOL/L — SIGNIFICANT CHANGE UP (ref 135–145)
WBC # BLD: 5.76 K/UL — SIGNIFICANT CHANGE UP (ref 3.8–10.5)
WBC # BLD: 7.38 K/UL — SIGNIFICANT CHANGE UP (ref 3.8–10.5)
WBC # FLD AUTO: 5.76 K/UL — SIGNIFICANT CHANGE UP (ref 3.8–10.5)
WBC # FLD AUTO: 7.38 K/UL — SIGNIFICANT CHANGE UP (ref 3.8–10.5)

## 2024-08-25 PROCEDURE — 70450 CT HEAD/BRAIN W/O DYE: CPT | Mod: 26

## 2024-08-25 PROCEDURE — 99223 1ST HOSP IP/OBS HIGH 75: CPT

## 2024-08-25 PROCEDURE — 73564 X-RAY EXAM KNEE 4 OR MORE: CPT | Mod: 26,RT

## 2024-08-25 PROCEDURE — 76377 3D RENDER W/INTRP POSTPROCES: CPT | Mod: 26

## 2024-08-25 PROCEDURE — 70486 CT MAXILLOFACIAL W/O DYE: CPT | Mod: 26

## 2024-08-25 RX ORDER — PANTOPRAZOLE SODIUM 40 MG
40 TABLET, DELAYED RELEASE (ENTERIC COATED) ORAL
Refills: 0 | Status: DISCONTINUED | OUTPATIENT
Start: 2024-08-25 | End: 2024-09-03

## 2024-08-25 RX ORDER — TAMSULOSIN HYDROCHLORIDE 0.4 MG/1
0.4 CAPSULE ORAL AT BEDTIME
Refills: 0 | Status: DISCONTINUED | OUTPATIENT
Start: 2024-08-25 | End: 2024-09-03

## 2024-08-25 RX ORDER — DEXTROSE 15 G/33 G
12.5 GEL IN PACKET (GRAM) ORAL ONCE
Refills: 0 | Status: DISCONTINUED | OUTPATIENT
Start: 2024-08-25 | End: 2024-09-03

## 2024-08-25 RX ORDER — ROSUVASTATIN CALCIUM 10 MG/1
20 TABLET ORAL AT BEDTIME
Refills: 0 | Status: DISCONTINUED | OUTPATIENT
Start: 2024-08-25 | End: 2024-09-03

## 2024-08-25 RX ORDER — DEXTROSE 15 G/33 G
15 GEL IN PACKET (GRAM) ORAL ONCE
Refills: 0 | Status: DISCONTINUED | OUTPATIENT
Start: 2024-08-25 | End: 2024-09-03

## 2024-08-25 RX ORDER — HEPARIN SODIUM,BOVINE 1000/ML
5000 VIAL (ML) INJECTION EVERY 8 HOURS
Refills: 0 | Status: DISCONTINUED | OUTPATIENT
Start: 2024-08-25 | End: 2024-08-25

## 2024-08-25 RX ORDER — ASPIRIN 81 MG
81 TABLET, DELAYED RELEASE (ENTERIC COATED) ORAL DAILY
Refills: 0 | Status: DISCONTINUED | OUTPATIENT
Start: 2024-08-25 | End: 2024-09-03

## 2024-08-25 RX ORDER — SACUBITRIL AND VALSARTAN 49; 51 MG/1; MG/1
1 TABLET, FILM COATED ORAL
Refills: 0 | Status: DISCONTINUED | OUTPATIENT
Start: 2024-08-25 | End: 2024-08-26

## 2024-08-25 RX ORDER — CLINDAMYCIN PHOSPHATE 150 MG/ML
300 VIAL (ML) INJECTION
Refills: 0 | Status: DISCONTINUED | OUTPATIENT
Start: 2024-08-25 | End: 2024-08-25

## 2024-08-25 RX ORDER — INSULIN GLARGINE 100 [IU]/ML
40 INJECTION, SOLUTION SUBCUTANEOUS AT BEDTIME
Refills: 0 | Status: DISCONTINUED | OUTPATIENT
Start: 2024-08-25 | End: 2024-08-26

## 2024-08-25 RX ORDER — METOPROLOL TARTRATE 100 MG/1
25 TABLET ORAL DAILY
Refills: 0 | Status: DISCONTINUED | OUTPATIENT
Start: 2024-08-25 | End: 2024-09-03

## 2024-08-25 RX ORDER — ACETAMINOPHEN 325 MG/1
650 TABLET ORAL EVERY 6 HOURS
Refills: 0 | Status: DISCONTINUED | OUTPATIENT
Start: 2024-08-25 | End: 2024-09-03

## 2024-08-25 RX ORDER — GLUCAGON INJECTION, SOLUTION 1 MG/.2ML
1 INJECTION, SOLUTION SUBCUTANEOUS ONCE
Refills: 0 | Status: DISCONTINUED | OUTPATIENT
Start: 2024-08-25 | End: 2024-09-03

## 2024-08-25 RX ORDER — DEXTROSE 15 G/33 G
25 GEL IN PACKET (GRAM) ORAL ONCE
Refills: 0 | Status: DISCONTINUED | OUTPATIENT
Start: 2024-08-25 | End: 2024-09-03

## 2024-08-25 RX ORDER — ONDANSETRON 2 MG/ML
4 INJECTION, SOLUTION INTRAMUSCULAR; INTRAVENOUS EVERY 8 HOURS
Refills: 0 | Status: DISCONTINUED | OUTPATIENT
Start: 2024-08-25 | End: 2024-09-03

## 2024-08-25 RX ORDER — OXYCODONE HYDROCHLORIDE 5 MG/1
5 TABLET ORAL DAILY
Refills: 0 | Status: DISCONTINUED | OUTPATIENT
Start: 2024-08-25 | End: 2024-09-01

## 2024-08-25 RX ORDER — BUMETANIDE 2 MG/1
4 TABLET ORAL
Refills: 0 | Status: DISCONTINUED | OUTPATIENT
Start: 2024-08-25 | End: 2024-08-30

## 2024-08-25 RX ORDER — METOPROLOL TARTRATE 100 MG/1
1 TABLET ORAL
Refills: 0 | DISCHARGE

## 2024-08-25 RX ORDER — SODIUM CHLORIDE 0.65 %
2 AEROSOL, SPRAY (ML) NASAL
Refills: 0 | Status: DISCONTINUED | OUTPATIENT
Start: 2024-08-25 | End: 2024-09-03

## 2024-08-25 RX ORDER — MAGNESIUM, ALUMINUM HYDROXIDE 200-225/5
30 SUSPENSION, ORAL (FINAL DOSE FORM) ORAL EVERY 4 HOURS
Refills: 0 | Status: DISCONTINUED | OUTPATIENT
Start: 2024-08-25 | End: 2024-09-03

## 2024-08-25 RX ORDER — OXYMETAZOLINE HYDROCHLORIDE 0.05 G/100ML
1 SPRAY, METERED NASAL ONCE
Refills: 0 | Status: COMPLETED | OUTPATIENT
Start: 2024-08-25 | End: 2024-08-25

## 2024-08-25 RX ADMIN — Medication 2 SPRAY(S): at 17:42

## 2024-08-25 RX ADMIN — Medication 5000 UNIT(S): at 05:35

## 2024-08-25 RX ADMIN — BUMETANIDE 132 MILLIGRAM(S): 2 TABLET ORAL at 17:18

## 2024-08-25 RX ADMIN — SACUBITRIL AND VALSARTAN 1 TABLET(S): 49; 51 TABLET, FILM COATED ORAL at 17:17

## 2024-08-25 RX ADMIN — OXYMETAZOLINE HYDROCHLORIDE 1 SPRAY(S): 0.05 SPRAY, METERED NASAL at 14:12

## 2024-08-25 RX ADMIN — INSULIN GLARGINE 40 UNIT(S): 100 INJECTION, SOLUTION SUBCUTANEOUS at 01:53

## 2024-08-25 RX ADMIN — BUMETANIDE 132 MILLIGRAM(S): 2 TABLET ORAL at 05:35

## 2024-08-25 RX ADMIN — METOPROLOL TARTRATE 25 MILLIGRAM(S): 100 TABLET ORAL at 17:17

## 2024-08-25 RX ADMIN — TAMSULOSIN HYDROCHLORIDE 0.4 MILLIGRAM(S): 0.4 CAPSULE ORAL at 21:34

## 2024-08-25 RX ADMIN — Medication 40 MILLIGRAM(S): at 05:35

## 2024-08-25 RX ADMIN — SACUBITRIL AND VALSARTAN 1 TABLET(S): 49; 51 TABLET, FILM COATED ORAL at 05:35

## 2024-08-25 RX ADMIN — ROSUVASTATIN CALCIUM 20 MILLIGRAM(S): 10 TABLET ORAL at 21:35

## 2024-08-25 NOTE — H&P ADULT - NSHPPHYSICALEXAM_GEN_ALL_CORE
Vital Signs Last 24 Hrs  T(C): 36.3 (25 Aug 2024 00:32), Max: 36.9 (24 Aug 2024 18:54)  T(F): 97.4 (25 Aug 2024 00:32), Max: 98.5 (24 Aug 2024 18:54)  HR: 93 (25 Aug 2024 00:32) (76 - 93)  BP: 100/70 (25 Aug 2024 00:32) (94/66 - 107/69)  BP(mean): 86 (24 Aug 2024 21:56) (76 - 86)  RR: 18 (25 Aug 2024 00:32) (17 - 18)  SpO2: 94% (25 Aug 2024 00:32) (94% - 100%)    Parameters below as of 25 Aug 2024 00:32  Patient On (Oxygen Delivery Method): room air        CONSTITUTIONAL: Well-groomed, in no apparent distress  EYES: No conjunctival or scleral injection, non-icteric  ENMT: No external nasal lesions; MMM  RESPIRATORY: Breathing comfortably; mild diminshed BS at bases, no wheezing   CARDIOVASCULAR: +S1S2, RRR; RLE with 2+ pitting and nonpitting edema with 1 open wound little bigger than size of a quarter, no purulence. LLE s/p AKA.   GASTROINTESTINAL: No tenderness, +BS throughout, no rebound/guarding  NEUROLOGIC: No gross focal neurological deficits, AAOX3  PSYCHIATRIC: mood and affect appropriate; appropriate insight and judgment

## 2024-08-25 NOTE — CHART NOTE - NSCHARTNOTEFT_GEN_A_CORE
Prescriptions Dispensed in [place]  Patient Demographic Information (PDI)       PDI	First Name	Last Name	Birth Date	Gender	Street Address	[facility]	Zip Code  A	SEVERIANO MARTINEZ	1960	Male	[address]	[place] [facility]	[address]  B	SEVERIANO MARTINEZ	1960	Male	[address]	[facility]	[address]    Prescription Information      PDI Filter:    PDI	My Rx	Current Rx	Rx Written	Rx Dispensed	Drug	Strength	Quantity	Days Supply	Prescriber Name	Payment Method	Dispenser  A	N	N	06/04/2024	06/04/2024	OXYCODONE HCL (IR) 5 MG TABLET		30.0	5	FELIX RIVERO	Insurance	[employer]  B	N	N	07/23/2024	07/23/2024	OXYCODONE HCL (IR) 5 MG TABLET		20.0	5	MD MENDEZ PAUL	Insurance	[employer]

## 2024-08-25 NOTE — H&P ADULT - NSHPLABSRESULTS_GEN_ALL_CORE
9.3    7.21  )-----------( 147      ( 24 Aug 2024 20:03 )             35.1       08-24    139  |  104  |  26<H>  ----------------------------<  146<H>  4.6   |  24  |  1.66<H>    Ca    8.7      24 Aug 2024 20:03  Mg     2.5     08-24    TPro  7.2  /  Alb  3.6  /  TBili  0.6  /  DBili  x   /  AST  18  /  ALT  8<L>  /  AlkPhos  115  08-24              Urinalysis Basic - ( 24 Aug 2024 20:03 )    Color: x / Appearance: x / SG: x / pH: x  Gluc: 146 mg/dL / Ketone: x  / Bili: x / Urobili: x   Blood: x / Protein: x / Nitrite: x   Leuk Esterase: x / RBC: x / WBC x   Sq Epi: x / Non Sq Epi: x / Bacteria: x                  CAPILLARY BLOOD GLUCOSE

## 2024-08-25 NOTE — H&P ADULT - PROBLEM SELECTOR PLAN 1
-received bumex 2mg ivp in the ED  -strict i/o  -no TTE aviailbe in the chart w/in last yr.     CHOOSE ONE: HFpEF or HFrEF  (EF = 25% on Date 03/2023)  Heart failure, CHF order set initiated    Daily weight to be reviewed today (decreased/stable/increased).  Guideline directed medical therapy as below: (Name/Dose/Frequency)  - Diuretic: actively diuresing   - BB: toprol 100   - entresto ?  - MRA: eplorenon?   - SGLT2: jardiance -received bumex 2mg ivp in the ED. ordered for 4mg iv bumex bid for now.   -strict i/o  -no TTE available in the chart w/in last yr.     CHOOSE ONE: HFpEF or HFrEF  (EF = 25% on Date 03/2023)  Heart failure, CHF order set initiated    Daily weight to be reviewed today (decreased/stable/increased).  Guideline directed medical therapy as below: (Name/Dose/Frequency)  - Diuretic: actively diuresing   - BB: toprol 25  - entresto 24/26  - MRA: not on it   - SGLT2: jardiance 10

## 2024-08-25 NOTE — H&P ADULT - HISTORY OF PRESENT ILLNESS
63M PMHx HFrEF s/p ICD, HTN, T2DM, s/p LLE amputation   Pt presented w/ ICD shock 30min prior to arrival to ED. Pt reports he's been having progressively worsening GREWAL, orthopnea and SOB for the past few days.     In the ED, BP on the softer side, otherwise VSS. OK on RA.   CBC w/ wbc 7.2, hgb 9.3, plt 147.   CMP w/ SCr 1.66, LFT unremarkable. Trop 77, 74. proBNP 3255.   CXR w/ RLL opacity, cannot r/o infection.     ICD interrogated by EP in the ED, ICD oversensing and shock was inappropriate.   Pt also found w/ new RBBB.   ED rec c/w beta blockers.   Pt received full dose asa and bumex in the ED for fluid overload.   Admit for ADHF.

## 2024-08-25 NOTE — CONSULT NOTE ADULT - SUBJECTIVE AND OBJECTIVE BOX
CC: L. epistaxis s/p fall    HPI: 62 y/o Male with PMHx of HFrEF s/p ICD, HTN, T2DM, s/p LLE amputation, initially presented s/p ICD shock 30mins prior to ED arrival. Pt reports he has been endorsing progressively worsening GREWAL, orthopnea, and SOB for the past few days, now admitted for acute decompensated heart failure. ENT consulted for left epistaxis that occurred s/p fall from inpt bed. Pt states he was laying in the bed when he suddenly developed a severe coughing episode and then found himself on the floor, states he fell on his face.                     PAST MEDICAL & SURGICAL HISTORY:  Stented coronary artery      Diabetes      AICD (automatic cardioverter/defibrillator) present      Hypertension      Heart failure with reduced ejection fraction      History of ischemic cardiomyopathy      History of COPD      H/O gastroesophageal reflux (GERD)      2019 novel coronavirus disease (COVID-19)      COVID-19 vaccine series completed      H/O vasectomy  20 yrs ago (2000)        Allergies    ceftriaxone (Rash)  doxepin (Other)  Zosyn (Pruritus)  vancomycin (Rash (Mild to Mod))    Intolerances      MEDICATIONS  (STANDING):  aspirin enteric coated 81 milliGRAM(s) Oral daily  buMETAnide IVPB 4 milliGRAM(s) IV Intermittent two times a day  clopidogrel Tablet 75 milliGRAM(s) Oral daily  dextrose 5%. 1000 milliLiter(s) (50 mL/Hr) IV Continuous <Continuous>  dextrose 5%. 1000 milliLiter(s) (100 mL/Hr) IV Continuous <Continuous>  dextrose 50% Injectable 25 Gram(s) IV Push once  dextrose 50% Injectable 12.5 Gram(s) IV Push once  dextrose 50% Injectable 25 Gram(s) IV Push once  glucagon  Injectable 1 milliGRAM(s) IntraMuscular once  insulin glargine Injectable (LANTUS) 40 Unit(s) SubCutaneous at bedtime  insulin lispro (ADMELOG) corrective regimen sliding scale   SubCutaneous three times a day before meals  insulin lispro (ADMELOG) corrective regimen sliding scale   SubCutaneous at bedtime  insulin lispro Injectable (ADMELOG) 15 Unit(s) SubCutaneous three times a day before meals  levoFLOXacin  Tablet 500 milliGRAM(s) Oral every 24 hours  metoprolol succinate ER 25 milliGRAM(s) Oral daily  pantoprazole    Tablet 40 milliGRAM(s) Oral before breakfast  rosuvastatin 20 milliGRAM(s) Oral at bedtime  sacubitril 24 mG/valsartan 26 mG 1 Tablet(s) Oral two times a day  tamsulosin 0.4 milliGRAM(s) Oral at bedtime    MEDICATIONS  (PRN):  acetaminophen     Tablet .. 650 milliGRAM(s) Oral every 6 hours PRN Temp greater or equal to 38C (100.4F), Mild Pain (1 - 3)  aluminum hydroxide/magnesium hydroxide/simethicone Suspension 30 milliLiter(s) Oral every 4 hours PRN Dyspepsia  dextrose Oral Gel 15 Gram(s) Oral once PRN Blood Glucose LESS THAN 70 milliGRAM(s)/deciliter  melatonin 3 milliGRAM(s) Oral at bedtime PRN Insomnia  ondansetron Injectable 4 milliGRAM(s) IV Push every 8 hours PRN Nausea and/or Vomiting  oxyCODONE    IR 5 milliGRAM(s) Oral daily PRN Severe Pain (7 - 10)      FAMILY HISTORY:  Family history of cardiac disorder, Paternal    Sibling  Still living? Unknown  Family history of COPD (chronic obstructive pulmonary disease), Age at diagnosis: Age Unknown.     Social History:  · Substance use	No     Tobacco Screening:  · Core Measure Site	No      ROS:   ENT: all negative except as noted in HPI   CV: denies palpitations  Pulm: denies hemoptysis  GI: denies change in appetite, indigestion, n/v  : denies pertinent urinary symptoms, urgency  Neuro: denies numbness/tingling, loss of sensation  Psych: denies anxiety  MS: + muscle weakness, hx of LLE amputation  Endo: denies heat/cold intolerance, excessive sweating  Vascular: +RLE edema    Vital Signs Last 24 Hrs  T(C): 36.6 (25 Aug 2024 13:30), Max: 36.9 (24 Aug 2024 18:54)  T(F): 97.8 (25 Aug 2024 13:30), Max: 98.5 (24 Aug 2024 18:54)  HR: 109 (25 Aug 2024 13:30) (76 - 109)  BP: 110/76 (25 Aug 2024 13:30) (94/66 - 110/76)  BP(mean): 86 (24 Aug 2024 21:56) (76 - 86)  RR: 18 (25 Aug 2024 13:30) (17 - 18)  SpO2: 96% (25 Aug 2024 13:30) (94% - 100%)    Parameters below as of 25 Aug 2024 13:30  Patient On (Oxygen Delivery Method): room air                              9.4    5.76  )-----------( 145      ( 25 Aug 2024 06:50 )             35.1    08-25    141  |  103  |  29<H>  ----------------------------<  144<H>  4.2   |  22  |  1.67<H>    Ca    8.7      25 Aug 2024 06:50  Phos  3.6     08-25  Mg     2.4     08-25    TPro  7.2  /  Alb  3.6  /  TBili  0.6  /  DBili  x   /  AST  18  /  ALT  8<L>  /  AlkPhos  115  08-24       PHYSICAL EXAM:  Gen: NAD  Skin: No rashes, bruises, or lesions  Head: Normocephalic, Atraumatic  Face: no edema, erythema, or fluctuance. Parotid glands soft without mass  Eyes: no scleral injection  Ears: Right - ear canal clear, TM intact without effusion or erythema. No evidence of any fluid drainage. No mastoid tenderness, erythema, or ear bulging            Left - ear canal clear, TM intact without effusion or erythema. No evidence of any fluid drainage. No mastoid tenderness, erythema, or ear bulging  Nose: Nares bilaterally patent, no discharge  Mouth: No Stridor / Drooling / Trismus.  Mucosa moist, tongue/uvula midline, oropharynx clear  Neck: Flat, supple, no lymphadenopathy, trachea midline, no masses  Lymphatic: No lymphadenopathy  Resp: breathing easily, no stridor  CV: no peripheral edema/cyanosis  GI: nondistended   Peripheral vascular: no JVD or edema  Neuro: facial nerve intact, no facial droop        Diagnostic Nasal Endoscopy: (Scope #2 used)    Flexible Laryngoscopy: (Scope #2 used)        IMAGING/ADDITIONAL STUDIES:  CC: L. epistaxis s/p fall    HPI: 62 y/o Male with PMHx of HFrEF s/p ICD, HTN, T2DM, s/p LLE amputation, initially presented s/p ICD shock 30mins prior to ED arrival. Pt reports he has been endorsing progressively worsening GREWAL, orthopnea, and SOB for the past few days, now admitted for acute decompensated heart failure. ENT consulted for left epistaxis that occurred s/p fall from inpt bed at around 1:30pm today. Pt states he was laying in the bed when he suddenly developed a severe coughing episode and then found himself on the floor, states he fell on his face. Pt currently on ASA and Plavix for CAD, was previously on subq heparin but now d/carlos s/p fall. Denies inability to tolerate secretions, changes to voice, facial pressure.               PAST MEDICAL & SURGICAL HISTORY:  Stented coronary artery      Diabetes      AICD (automatic cardioverter/defibrillator) present      Hypertension      Heart failure with reduced ejection fraction      History of ischemic cardiomyopathy      History of COPD      H/O gastroesophageal reflux (GERD)      2019 novel coronavirus disease (COVID-19)      COVID-19 vaccine series completed      H/O vasectomy  20 yrs ago (2000)        Allergies    ceftriaxone (Rash)  doxepin (Other)  Zosyn (Pruritus)  vancomycin (Rash (Mild to Mod))    Intolerances      MEDICATIONS  (STANDING):  aspirin enteric coated 81 milliGRAM(s) Oral daily  buMETAnide IVPB 4 milliGRAM(s) IV Intermittent two times a day  clopidogrel Tablet 75 milliGRAM(s) Oral daily  dextrose 5%. 1000 milliLiter(s) (50 mL/Hr) IV Continuous <Continuous>  dextrose 5%. 1000 milliLiter(s) (100 mL/Hr) IV Continuous <Continuous>  dextrose 50% Injectable 25 Gram(s) IV Push once  dextrose 50% Injectable 12.5 Gram(s) IV Push once  dextrose 50% Injectable 25 Gram(s) IV Push once  glucagon  Injectable 1 milliGRAM(s) IntraMuscular once  insulin glargine Injectable (LANTUS) 40 Unit(s) SubCutaneous at bedtime  insulin lispro (ADMELOG) corrective regimen sliding scale   SubCutaneous three times a day before meals  insulin lispro (ADMELOG) corrective regimen sliding scale   SubCutaneous at bedtime  insulin lispro Injectable (ADMELOG) 15 Unit(s) SubCutaneous three times a day before meals  levoFLOXacin  Tablet 500 milliGRAM(s) Oral every 24 hours  metoprolol succinate ER 25 milliGRAM(s) Oral daily  pantoprazole    Tablet 40 milliGRAM(s) Oral before breakfast  rosuvastatin 20 milliGRAM(s) Oral at bedtime  sacubitril 24 mG/valsartan 26 mG 1 Tablet(s) Oral two times a day  tamsulosin 0.4 milliGRAM(s) Oral at bedtime    MEDICATIONS  (PRN):  acetaminophen     Tablet .. 650 milliGRAM(s) Oral every 6 hours PRN Temp greater or equal to 38C (100.4F), Mild Pain (1 - 3)  aluminum hydroxide/magnesium hydroxide/simethicone Suspension 30 milliLiter(s) Oral every 4 hours PRN Dyspepsia  dextrose Oral Gel 15 Gram(s) Oral once PRN Blood Glucose LESS THAN 70 milliGRAM(s)/deciliter  melatonin 3 milliGRAM(s) Oral at bedtime PRN Insomnia  ondansetron Injectable 4 milliGRAM(s) IV Push every 8 hours PRN Nausea and/or Vomiting  oxyCODONE    IR 5 milliGRAM(s) Oral daily PRN Severe Pain (7 - 10)      FAMILY HISTORY:  Family history of cardiac disorder, Paternal    Sibling  Still living? Unknown  Family history of COPD (chronic obstructive pulmonary disease), Age at diagnosis: Age Unknown.     Social History:  · Substance use	No     Tobacco Screening:  · Core Measure Site	No      ROS:   ENT: all negative except as noted in HPI   CV: denies palpitations  Pulm: denies hemoptysis  GI: denies change in appetite, indigestion, n/v  : denies pertinent urinary symptoms, urgency  Neuro: denies numbness/tingling, loss of sensation  Psych: denies anxiety  MS: + muscle weakness, hx of LLE amputation  Endo: denies heat/cold intolerance, excessive sweating  Vascular: +RLE edema    Vital Signs Last 24 Hrs  T(C): 36.6 (25 Aug 2024 13:30), Max: 36.9 (24 Aug 2024 18:54)  T(F): 97.8 (25 Aug 2024 13:30), Max: 98.5 (24 Aug 2024 18:54)  HR: 109 (25 Aug 2024 13:30) (76 - 109)  BP: 110/76 (25 Aug 2024 13:30) (94/66 - 110/76)  BP(mean): 86 (24 Aug 2024 21:56) (76 - 86)  RR: 18 (25 Aug 2024 13:30) (17 - 18)  SpO2: 96% (25 Aug 2024 13:30) (94% - 100%)    Parameters below as of 25 Aug 2024 13:30  Patient On (Oxygen Delivery Method): room air                              9.4    5.76  )-----------( 145      ( 25 Aug 2024 06:50 )             35.1    08-25    141  |  103  |  29<H>  ----------------------------<  144<H>  4.2   |  22  |  1.67<H>    Ca    8.7      25 Aug 2024 06:50  Phos  3.6     08-25  Mg     2.4     08-25    TPro  7.2  /  Alb  3.6  /  TBili  0.6  /  DBili  x   /  AST  18  /  ALT  8<L>  /  AlkPhos  115  08-24       PHYSICAL EXAM:  Gen: NAD  Skin: No rashes  Head: Normocephalic  Face: no edema, erythema, or fluctuance. Parotid glands soft without mass. No facial tenderness. No han sign b/l.  Eyes: no scleral injection. No periorbital ecchymosis b/l.   Nose: Left nare with gauze packing that was fully soaked with blood, removed gauze packing and moderate amount of bleeding was coming from left nare, controlled with nasopore coated in bacitracin, no further active bleeding. Right nare patent, no bleeding noted. No palpable crepitus or step-offs. No nasal TTP.  Mouth: + Small left inner lip laceration that had self-resolved, minimal streaking of blood in posterior oropharynx, no active bleeding. No Stridor / Drooling / Trismus.  Mucosa moist, tongue/uvula midline.   Neck: Flat, supple, no lymphadenopathy, trachea midline, no masses  Lymphatic: No lymphadenopathy  Resp: breathing easily, no stridor  CV: + RLE edema, hx of LLE amputation  GI: +distended  Neuro: facial nerve intact, no facial droop                IMAGING/ADDITIONAL STUDIES:

## 2024-08-25 NOTE — CHART NOTE - NSCHARTNOTEFT_GEN_A_CORE
patient was seen at bedside in the morning and later in the afternoon     -still look volume overload on exam, f/u TTE and continue diuresis   -continue home levaquin  -later in the afternoon he fell off on his face and is having epistaxis, will get stat CT head and maxillofacial CT to see any fracture of the nose   -consulted ENT and started on affrin per ENT recs, will wait for further recs   -DC Sub Q heparin for now given bleeding, recheck his hb in an hour to keep hb goal >8   -d/w ACP Ana

## 2024-08-25 NOTE — CHART NOTE - NSCHARTNOTEFT_GEN_A_CORE
Patient fell from stretcher to floor, landing on his face  Patient found on his knee, blood noted from nose    Vital Signs Last 24 Hrs  T(C): 36.5 (25 Aug 2024 12:28), Max: 36.9 (24 Aug 2024 18:54)  T(F): 97.7 (25 Aug 2024 12:28), Max: 98.5 (24 Aug 2024 18:54)  HR: 91 (25 Aug 2024 12:28) (76 - 93)  BP: 104/72 (25 Aug 2024 12:28) (94/66 - 107/69)  BP(mean): 86 (24 Aug 2024 21:56) (76 - 86)  RR: 18 (25 Aug 2024 12:28) (17 - 18)  SpO2: 95% (25 Aug 2024 12:28) (94% - 100%)    Parameters below as of 25 Aug 2024 12:28  Patient On (Oxygen Delivery Method): room air    CONSTITUTIONAL: Well groomed, anxious, blood noted from left nare  ENMT: Oral mucosa with moist membranes. Normal dentition; lower lip on left hand side appears swollen  NECK: Supple, symmetric and without tracheal deviation   RESP: No respiratory distress, no use of accessory muscles; CTA b/l, no WRR  CV: RRR, +S1S2, no MRG; no JVD; no peripheral edema  MSK: Normal gait; No digital clubbing or cyanosis; examination of the (head/neck/spine/ribs/pelvis, RUE, LUE, RLE, LLE) without misalignment, Normal ROM without pain, no spinal tenderness, normal muscle strength/tone  SKIN: No rashes or ulcers noted; no subcutaneous nodules or induration palpable  NEURO: CN II-XII intact; normal reflexes in upper and lower extremities, sensation intact in upper and lower extremities b/l to light touch   PSYCH: Appropriate insight/judgment; A+O x 3, mood and affect appropriate, recent/remote memory intact    Assessment  63yM PMH CHF, DM, HTN, presenting after his AICD went off. He was at home moving from his wheelchair to his bed when he felt 1 shock about 30 minutes prior to arrival. He immediately called EMS. He states he has been feeling more short of breath the past few days and has significant dyspnea on exertion as well as orthopnea. He denies chest pain besides when the shock occurred. No fevers/chills, abdominal pain, nausea, vomiting. Has R lower extremity edema. Now with inpatient fall     Plan  >CTH and CT maxillofacial ordered; non con given MANJIT   >R knee xray ordered   >ENT consulted for epistaxis   >Medical attending, Dr. Giron made aware and in agreement with the above plan   >Patient's family updated by family at bedside     Ana Trotter PA-C  Dept of Medicine

## 2024-08-25 NOTE — H&P ADULT - NSHPADDITIONALINFOADULT_GEN_ALL_CORE
#RLL opacity on CXR  -pt without cough, sputum production, fever, or leukocytosis.   -will monitor off abx.     #cellulitis of rle, improving  -pt on levoquin from outpatient provider. will c/w while patient is here. #RLL opacity on CXR  -pt without cough, sputum production, fever, or leukocytosis.   -will not add additional abx (see below for levaquin)     #cellulitis of rle, improving  -pt on Levaquin from outpatient provider. will c/w while patient is here.

## 2024-08-26 ENCOUNTER — RESULT REVIEW (OUTPATIENT)
Age: 64
End: 2024-08-26

## 2024-08-26 LAB
ANION GAP SERPL CALC-SCNC: 15 MMOL/L — SIGNIFICANT CHANGE UP (ref 5–17)
BUN SERPL-MCNC: 31 MG/DL — HIGH (ref 7–23)
CALCIUM SERPL-MCNC: 8.6 MG/DL — SIGNIFICANT CHANGE UP (ref 8.4–10.5)
CHLORIDE SERPL-SCNC: 100 MMOL/L — SIGNIFICANT CHANGE UP (ref 96–108)
CO2 SERPL-SCNC: 25 MMOL/L — SIGNIFICANT CHANGE UP (ref 22–31)
CREAT SERPL-MCNC: 1.9 MG/DL — HIGH (ref 0.5–1.3)
EGFR: 40 ML/MIN/1.73M2 — LOW
GLUCOSE BLDC GLUCOMTR-MCNC: 119 MG/DL — HIGH (ref 70–99)
GLUCOSE BLDC GLUCOMTR-MCNC: 128 MG/DL — HIGH (ref 70–99)
GLUCOSE BLDC GLUCOMTR-MCNC: 140 MG/DL — HIGH (ref 70–99)
GLUCOSE BLDC GLUCOMTR-MCNC: 152 MG/DL — HIGH (ref 70–99)
GLUCOSE BLDC GLUCOMTR-MCNC: 62 MG/DL — LOW (ref 70–99)
GLUCOSE BLDC GLUCOMTR-MCNC: 67 MG/DL — LOW (ref 70–99)
GLUCOSE SERPL-MCNC: 138 MG/DL — HIGH (ref 70–99)
HCT VFR BLD CALC: 35.2 % — LOW (ref 39–50)
HGB BLD-MCNC: 9.4 G/DL — LOW (ref 13–17)
MAGNESIUM SERPL-MCNC: 2.2 MG/DL — SIGNIFICANT CHANGE UP (ref 1.6–2.6)
MCHC RBC-ENTMCNC: 19.4 PG — LOW (ref 27–34)
MCHC RBC-ENTMCNC: 26.7 GM/DL — LOW (ref 32–36)
MCV RBC AUTO: 72.6 FL — LOW (ref 80–100)
NRBC # BLD: 0 /100 WBCS — SIGNIFICANT CHANGE UP (ref 0–0)
PLATELET # BLD AUTO: 145 K/UL — LOW (ref 150–400)
POTASSIUM SERPL-MCNC: 3.8 MMOL/L — SIGNIFICANT CHANGE UP (ref 3.5–5.3)
POTASSIUM SERPL-SCNC: 3.8 MMOL/L — SIGNIFICANT CHANGE UP (ref 3.5–5.3)
RBC # BLD: 4.85 M/UL — SIGNIFICANT CHANGE UP (ref 4.2–5.8)
RBC # FLD: 20.8 % — HIGH (ref 10.3–14.5)
SODIUM SERPL-SCNC: 140 MMOL/L — SIGNIFICANT CHANGE UP (ref 135–145)
WBC # BLD: 6.53 K/UL — SIGNIFICANT CHANGE UP (ref 3.8–10.5)
WBC # FLD AUTO: 6.53 K/UL — SIGNIFICANT CHANGE UP (ref 3.8–10.5)

## 2024-08-26 PROCEDURE — 99233 SBSQ HOSP IP/OBS HIGH 50: CPT

## 2024-08-26 PROCEDURE — 99221 1ST HOSP IP/OBS SF/LOW 40: CPT

## 2024-08-26 PROCEDURE — 93306 TTE W/DOPPLER COMPLETE: CPT | Mod: 26

## 2024-08-26 RX ORDER — SACUBITRIL AND VALSARTAN 49; 51 MG/1; MG/1
1 TABLET, FILM COATED ORAL EVERY 12 HOURS
Refills: 0 | Status: DISCONTINUED | OUTPATIENT
Start: 2024-08-27 | End: 2024-09-03

## 2024-08-26 RX ORDER — POTASSIUM CHLORIDE 10 MEQ
20 TABLET, EXT RELEASE, PARTICLES/CRYSTALS ORAL ONCE
Refills: 0 | Status: COMPLETED | OUTPATIENT
Start: 2024-08-26 | End: 2024-08-26

## 2024-08-26 RX ORDER — INSULIN GLARGINE 100 [IU]/ML
30 INJECTION, SOLUTION SUBCUTANEOUS AT BEDTIME
Refills: 0 | Status: DISCONTINUED | OUTPATIENT
Start: 2024-08-26 | End: 2024-09-03

## 2024-08-26 RX ORDER — AMMONIUM LACTATE 12 %
1 LOTION (GRAM) TOPICAL EVERY 12 HOURS
Refills: 0 | Status: DISCONTINUED | OUTPATIENT
Start: 2024-08-26 | End: 2024-09-03

## 2024-08-26 RX ADMIN — Medication 40 MILLIGRAM(S): at 06:32

## 2024-08-26 RX ADMIN — Medication 1 APPLICATION(S): at 17:34

## 2024-08-26 RX ADMIN — BUMETANIDE 132 MILLIGRAM(S): 2 TABLET ORAL at 21:02

## 2024-08-26 RX ADMIN — Medication 81 MILLIGRAM(S): at 13:08

## 2024-08-26 RX ADMIN — Medication 2 SPRAY(S): at 11:44

## 2024-08-26 RX ADMIN — Medication 1: at 08:30

## 2024-08-26 RX ADMIN — Medication 2 SPRAY(S): at 06:45

## 2024-08-26 RX ADMIN — Medication 15 UNIT(S): at 08:30

## 2024-08-26 RX ADMIN — Medication 15 UNIT(S): at 12:06

## 2024-08-26 RX ADMIN — BUMETANIDE 132 MILLIGRAM(S): 2 TABLET ORAL at 13:07

## 2024-08-26 RX ADMIN — Medication 2 SPRAY(S): at 23:11

## 2024-08-26 RX ADMIN — METOPROLOL TARTRATE 25 MILLIGRAM(S): 100 TABLET ORAL at 11:08

## 2024-08-26 RX ADMIN — ROSUVASTATIN CALCIUM 20 MILLIGRAM(S): 10 TABLET ORAL at 21:08

## 2024-08-26 RX ADMIN — Medication 2 SPRAY(S): at 17:37

## 2024-08-26 RX ADMIN — INSULIN GLARGINE 30 UNIT(S): 100 INJECTION, SOLUTION SUBCUTANEOUS at 21:13

## 2024-08-26 RX ADMIN — Medication 3 MILLIGRAM(S): at 02:08

## 2024-08-26 RX ADMIN — INSULIN GLARGINE 40 UNIT(S): 100 INJECTION, SOLUTION SUBCUTANEOUS at 00:01

## 2024-08-26 RX ADMIN — Medication 75 MILLIGRAM(S): at 13:08

## 2024-08-26 RX ADMIN — Medication 20 MILLIEQUIVALENT(S): at 13:07

## 2024-08-26 RX ADMIN — TAMSULOSIN HYDROCHLORIDE 0.4 MILLIGRAM(S): 0.4 CAPSULE ORAL at 21:09

## 2024-08-26 NOTE — PROGRESS NOTE ADULT - SUBJECTIVE AND OBJECTIVE BOX
CC: L. epistaxis s/p fall    HPI: 64 y/o Male with PMHx of HFrEF s/p ICD, HTN, T2DM, s/p LLE amputation, initially presented s/p ICD shock 30mins prior to ED arrival. Pt reports he has been endorsing progressively worsening GREWAL, orthopnea, and SOB for the past few days, now admitted for acute decompensated heart failure. ENT consulted for left epistaxis that occurred s/p fall from inpt bed. Pt states he was laying in the bed when he suddenly developed a severe coughing episode and then found himself on the floor, states he fell on his face. nasopore packing was placed in left nare. No further reports of bleeding. Pt c/o dry mouth and nasal congestion. Denies facial numbness, hx of nasal sx, post nasal drip.          PAST MEDICAL & SURGICAL HISTORY:  Stented coronary artery      Diabetes      AICD (automatic cardioverter/defibrillator) present      Hypertension      Heart failure with reduced ejection fraction      History of ischemic cardiomyopathy      History of COPD      H/O gastroesophageal reflux (GERD)      2019 novel coronavirus disease (COVID-19)      COVID-19 vaccine series completed      H/O vasectomy  20 yrs ago (2000)        Allergies    ceftriaxone (Rash)  doxepin (Other)  Zosyn (Pruritus)  vancomycin (Rash (Mild to Mod))    Intolerances      MEDICATIONS  (STANDING):  aspirin enteric coated 81 milliGRAM(s) Oral daily  buMETAnide IVPB 4 milliGRAM(s) IV Intermittent two times a day  clopidogrel Tablet 75 milliGRAM(s) Oral daily  dextrose 5%. 1000 milliLiter(s) (50 mL/Hr) IV Continuous <Continuous>  dextrose 5%. 1000 milliLiter(s) (100 mL/Hr) IV Continuous <Continuous>  dextrose 50% Injectable 25 Gram(s) IV Push once  dextrose 50% Injectable 12.5 Gram(s) IV Push once  dextrose 50% Injectable 25 Gram(s) IV Push once  glucagon  Injectable 1 milliGRAM(s) IntraMuscular once  insulin glargine Injectable (LANTUS) 40 Unit(s) SubCutaneous at bedtime  insulin lispro (ADMELOG) corrective regimen sliding scale   SubCutaneous three times a day before meals  insulin lispro (ADMELOG) corrective regimen sliding scale   SubCutaneous at bedtime  insulin lispro Injectable (ADMELOG) 15 Unit(s) SubCutaneous three times a day before meals  levoFLOXacin  Tablet 500 milliGRAM(s) Oral every 24 hours  metoprolol succinate ER 25 milliGRAM(s) Oral daily  pantoprazole    Tablet 40 milliGRAM(s) Oral before breakfast  rosuvastatin 20 milliGRAM(s) Oral at bedtime  sacubitril 24 mG/valsartan 26 mG 1 Tablet(s) Oral two times a day  sodium chloride 0.65% Nasal 2 Spray(s) Both Nostrils four times a day  tamsulosin 0.4 milliGRAM(s) Oral at bedtime    MEDICATIONS  (PRN):  acetaminophen     Tablet .. 650 milliGRAM(s) Oral every 6 hours PRN Temp greater or equal to 38C (100.4F), Mild Pain (1 - 3)  aluminum hydroxide/magnesium hydroxide/simethicone Suspension 30 milliLiter(s) Oral every 4 hours PRN Dyspepsia  dextrose Oral Gel 15 Gram(s) Oral once PRN Blood Glucose LESS THAN 70 milliGRAM(s)/deciliter  melatonin 3 milliGRAM(s) Oral at bedtime PRN Insomnia  ondansetron Injectable 4 milliGRAM(s) IV Push every 8 hours PRN Nausea and/or Vomiting  oxyCODONE    IR 5 milliGRAM(s) Oral daily PRN Severe Pain (7 - 10)      Social History: see consult    Family history: see consult    ROS:   ENT: all negative except as noted in HPI   Pulm: denies SOB, cough, hemoptysis  Neuro: denies numbness/tingling, loss of sensation  Endo: denies heat/cold intolerance, excessive sweating      Vital Signs Last 24 Hrs  T(C): 36.4 (26 Aug 2024 04:16), Max: 36.6 (25 Aug 2024 13:30)  T(F): 97.5 (26 Aug 2024 04:16), Max: 97.8 (25 Aug 2024 13:30)  HR: 91 (26 Aug 2024 04:16) (91 - 109)  BP: 92/56 (26 Aug 2024 06:33) (92/56 - 110/76)  BP(mean): --  RR: 18 (26 Aug 2024 04:16) (18 - 20)  SpO2: 94% (26 Aug 2024 04:16) (93% - 96%)    Parameters below as of 26 Aug 2024 04:16  Patient On (Oxygen Delivery Method): room air                              9.4    6.53  )-----------( 145      ( 26 Aug 2024 07:13 )             35.2    08-25    141  |  103  |  29<H>  ----------------------------<  144<H>  4.2   |  22  |  1.67<H>    Ca    8.7      25 Aug 2024 06:50  Phos  3.6     08-25  Mg     2.4     08-25    TPro  7.2  /  Alb  3.6  /  TBili  0.6  /  DBili  x   /  AST  18  /  ALT  8<L>  /  AlkPhos  115  08-24       PHYSICAL EXAM:  Gen: NAD  Skin: No rashes, bruises, or lesions  Head: Normocephalic, Atraumatic  Face: no edema, erythema, or fluctuance. Parotid glands soft without mass  Eyes: no scleral injection  Nose: R nare- patent. L nare- nasopore in place. no bleeding.  Mouth: No Stridor / Drooling / Trismus.  Mucosa dry, tongue/uvula midline, oropharynx clear, no bleeding  Neck: Flat, supple, no lymphadenopathy, trachea midline, no masses  Lymphatic: No lymphadenopathy  Resp: breathing easily, no stridor  Neuro: facial nerve intact, no facial droop        < from: CT Head No Cont (08.25.24 @ 17:00) >  IMPRESSION:    NONCONTRAST HEAD CT: No acute intracranial hemorrhage, mass effect, or   shift of the midline structures.    NONCONTRAST MAXILLOFACIAL CT: No acute facial fractures.    Moderate amount of soft tissue swelling around the nasal bones.    --- End of Report ---    < end of copied text >

## 2024-08-26 NOTE — PHYSICAL THERAPY INITIAL EVALUATION ADULT - ADDITIONAL COMMENTS
Pt lives in a private home with spouse in [place], with 3 steps to enter, first floor set up. Pt performed ADL/IADLs independently, wife helps if needed. Ambulates short distances with rolling walker, otherwise stand pivot transfer to wheelchair, primarily uses wheelchair to get around. Pt has a temporary prosthetic for LLE, in the process of getting sized for a new left prosthetic. Owns DME: rolling walker, wheelchair. Wife informed to bring prosthetic

## 2024-08-26 NOTE — CHART NOTE - NSCHARTNOTEFT_GEN_A_CORE
Pt with noted BS 67 per RN. Pt on high doses of lantus and premeal, home doses. Discussed with Dr. Sow, decreased by 20%, Dr. Flanagan to consult. Will follow closely

## 2024-08-26 NOTE — PROGRESS NOTE ADULT - SUBJECTIVE AND OBJECTIVE BOX
Electrophysiology:    63y.o. male with hx of ischemic cardiomyopathy with HFrEF 30% with prior transvenous ICD extraction and subsequent [employer] S-ICD implant 2020, CKD 3, HTN, T2 DM, COPD, LUIS ALFREDO, and LLE amputee who presented after ICD shock.  Patient admits he's had complaints of respiratory congestion, LE swelling and dyspnea for the past few months, he was on varying doses of bumex at home, recently increased to 2mg BID about 2weeks ago. He was doing well otherwise w/o chest pain, palp, dizziness, lightheadedness. He was transferring from his wheelchair to his bed when he suddenly felt an ICD shock prompting him to present to the ED.  S-ICD Interrogation showed an inappropriate ICD shock for device oversensing (see procedure note for full interrogation). Pt reports no prior ICD shocks in his life.        1. Inappropriate S-ICD shock for device oversensing  2. Acute decompensated heart failure   3. Lower extremity Cellulitis     - ICD reinterrogated by [employer] Rep today and screening failed in all vectors.  ICD has been deactivated to prevent inappropriate ICD shock.  - R2 Pads on patient as discussed with bedside RN  - Currently being treated for HF exacerbation and cellulitis   - EP will follow closely and determine further ICD plans, possible S-ICD removal with reimplant once medically stable   - Above discussed with primary team     YAIMA Ford [facility]  [id number]      Electrophysiology:    63y.o. male with hx of ischemic cardiomyopathy with HFrEF 30%, cardiac arrest with prior transvenous ICD extraction and [facility] 2020, CKD 3, HTN, T2 DM, COPD, LUIS ALFREDO, and LLE amputee who presented after ICD shock.  Patient admits he's had complaints of respiratory congestion, LE swelling and dyspnea for the past few months, he was on varying doses of bumex at home, recently increased to 2mg BID about 2weeks ago. He was doing well otherwise w/o chest pain, palp, dizziness, lightheadedness. He was transferring from his wheelchair to his bed when he suddenly felt an ICD shock prompting him to present to the ED.  S-ICD Interrogation showed an inappropriate ICD shock for device oversensing (see procedure note for full interrogation). Pt reports no prior ICD shocks in his life.        1. Inappropriate S-ICD shock for device oversensing  2. Acute decompensated heart failure   3. Lower extremity Cellulitis     - ICD reinterrogated by [employer] Rep today and screening failed in all vectors.  ICD has been deactivated to prevent inappropriate ICD shock.  - R2 Pads on patient as discussed with bedside RN  - Currently being treated for HF exacerbation and cellulitis   - EP will follow closely and determine further ICD plans, possible S-ICD removal with reimplant once medically stable   - Above discussed with primary team     YAIMA Ford [facility]-BC  [id number]

## 2024-08-26 NOTE — CONSULT NOTE ADULT - SUBJECTIVE AND OBJECTIVE BOX
Patient is a 63y old  Male who presents with a chief complaint of     HPI:  63M PMHx HFrEF s/p ICD, HTN, T2DM, s/p LLE amputation   Pt presented w/ ICD shock 30min prior to arrival to ED. Pt reports he's been having progressively worsening GREWAL, orthopnea and SOB for the past few days.     In the ED, BP on the softer side, otherwise VSS. OK on RA.   CBC w/ wbc 7.2, hgb 9.3, plt 147.   CMP w/ SCr 1.66, LFT unremarkable. Trop 77, 74. proBNP 3255.   CXR w/ RLL opacity, cannot r/o infection.     ICD interrogated by EP in the ED, ICD oversensing and shock was inappropriate.   Pt also found w/ new RBBB.   ED rec c/w beta blockers.   Pt received full dose asa and bumex in the ED for fluid overload.   Admit for ADHF.  (25 Aug 2024 00:08)      PAST MEDICAL & SURGICAL HISTORY:  Stented coronary artery      Diabetes      AICD (automatic cardioverter/defibrillator) present      Hypertension      Heart failure with reduced ejection fraction      History of ischemic cardiomyopathy      History of COPD      H/O gastroesophageal reflux (GERD)      2019 novel coronavirus disease (COVID-19)      COVID-19 vaccine series completed      H/O vasectomy  20 yrs ago (2000)          MEDICATIONS  (STANDING):  ammonium lactate 12% Lotion 1 Application(s) Topical every 12 hours  aspirin enteric coated 81 milliGRAM(s) Oral daily  buMETAnide IVPB 4 milliGRAM(s) IV Intermittent two times a day  clopidogrel Tablet 75 milliGRAM(s) Oral daily  dextrose 5%. 1000 milliLiter(s) (50 mL/Hr) IV Continuous <Continuous>  dextrose 5%. 1000 milliLiter(s) (100 mL/Hr) IV Continuous <Continuous>  dextrose 50% Injectable 12.5 Gram(s) IV Push once  dextrose 50% Injectable 25 Gram(s) IV Push once  dextrose 50% Injectable 25 Gram(s) IV Push once  glucagon  Injectable 1 milliGRAM(s) IntraMuscular once  insulin glargine Injectable (LANTUS) 30 Unit(s) SubCutaneous at bedtime  insulin lispro (ADMELOG) corrective regimen sliding scale   SubCutaneous three times a day before meals  insulin lispro (ADMELOG) corrective regimen sliding scale   SubCutaneous at bedtime  insulin lispro Injectable (ADMELOG) 10 Unit(s) SubCutaneous three times a day before meals  levoFLOXacin  Tablet 500 milliGRAM(s) Oral every 24 hours  metoprolol succinate ER 25 milliGRAM(s) Oral daily  pantoprazole    Tablet 40 milliGRAM(s) Oral before breakfast  rosuvastatin 20 milliGRAM(s) Oral at bedtime  sodium chloride 0.65% Nasal 2 Spray(s) Both Nostrils four times a day  tamsulosin 0.4 milliGRAM(s) Oral at bedtime    MEDICATIONS  (PRN):  acetaminophen     Tablet .. 650 milliGRAM(s) Oral every 6 hours PRN Temp greater or equal to 38C (100.4F), Mild Pain (1 - 3)  aluminum hydroxide/magnesium hydroxide/simethicone Suspension 30 milliLiter(s) Oral every 4 hours PRN Dyspepsia  dextrose Oral Gel 15 Gram(s) Oral once PRN Blood Glucose LESS THAN 70 milliGRAM(s)/deciliter  melatonin 3 milliGRAM(s) Oral at bedtime PRN Insomnia  ondansetron Injectable 4 milliGRAM(s) IV Push every 8 hours PRN Nausea and/or Vomiting  oxyCODONE    IR 5 milliGRAM(s) Oral daily PRN Severe Pain (7 - 10)      Allergies    ceftriaxone (Rash)  doxepin (Other)  Zosyn (Pruritus)  vancomycin (Rash (Mild to Mod))    Intolerances        VITALS:    Vital Signs Last 24 Hrs  T(C): 36.6 (26 Aug 2024 16:40), Max: 36.6 (26 Aug 2024 16:40)  T(F): 97.9 (26 Aug 2024 16:40), Max: 97.9 (26 Aug 2024 16:40)  HR: 87 (26 Aug 2024 16:40) (87 - 98)  BP: 93/59 (26 Aug 2024 16:40) (92/56 - 105/67)  BP(mean): --  RR: 18 (26 Aug 2024 16:40) (18 - 20)  SpO2: 95% (26 Aug 2024 16:40) (93% - 97%)    Parameters below as of 26 Aug 2024 16:40  Patient On (Oxygen Delivery Method): room air        LABS:                          9.4    6.53  )-----------( 145      ( 26 Aug 2024 07:13 )             35.2       08-26    140  |  100  |  31<H>  ----------------------------<  138<H>  3.8   |  25  |  1.90<H>    Ca    8.6      26 Aug 2024 07:15  Phos  3.6     08-25  Mg     2.2     08-26    TPro  7.2  /  Alb  3.6  /  TBili  0.6  /  DBili  x   /  AST  18  /  ALT  8<L>  /  AlkPhos  115  08-24      CAPILLARY BLOOD GLUCOSE      POCT Blood Glucose.: 62 mg/dL (26 Aug 2024 17:19)  POCT Blood Glucose.: 67 mg/dL (26 Aug 2024 17:18)  POCT Blood Glucose.: 140 mg/dL (26 Aug 2024 11:38)  POCT Blood Glucose.: 152 mg/dL (26 Aug 2024 07:31)  POCT Blood Glucose.: 160 mg/dL (25 Aug 2024 23:52)          LOWER EXTREMITY PHYSICAL EXAM:  BELOW KNEE AMPUTATION LEFT LEG  Vascular: DP/PT 0/4, r/F, CFT <3 seconds R/F, Temperature gradient warm right foot.  Venous insufficeincy right leg  Dried eschar noted dorsal right foot. Ulcerative weeping lesions proximal right leg, venous insufficiency right leg  Neuro: Epicritic sensation Absent to the level of right foot.  Musculoskeletal/Ortho:  Skin:PIGMENTED SCALY DERMATITIS RIGHT LEG WITH XEROTIC SKIN RIGHT FOOT  Thick, mycotic dystrophic discolored nails 1,2,3,4,5 right foot.

## 2024-08-26 NOTE — CONSULT NOTE ADULT - SUBJECTIVE AND OBJECTIVE BOX
DATE OF SERVICE: 08-26-24      CHIEF COMPLAINT:Patient is a 63y old  Male who presents with a chief complaint of     HISTORY OF PRESENT ILLNESS:HPI:  63M PMHx HFrEF s/p ICD, HTN, T2DM, s/p LLE amputation   Pt presented w/ ICD shock 30min prior to arrival to ED. Pt reports he's been having progressively worsening GREWAL, orthopnea and SOB for the past few days.     In the ED, BP on the softer side, otherwise VSS. OK on RA.   CBC w/ wbc 7.2, hgb 9.3, plt 147.   CMP w/ SCr 1.66, LFT unremarkable. Trop 77, 74. proBNP 3255.   CXR w/ RLL opacity, cannot r/o infection.     ICD interrogated by EP in the ED, ICD oversensing and shock was inappropriate.   Pt also found w/ new RBBB.   ED rec c/w beta blockers.   Pt received full dose asa and bumex in the ED for fluid overload.   Admit for ADHF.  (25 Aug 2024 00:08)      PAST MEDICAL & SURGICAL HISTORY:  Stented coronary artery      Diabetes      AICD (automatic cardioverter/defibrillator) present      Hypertension      Heart failure with reduced ejection fraction      History of ischemic cardiomyopathy      History of COPD      H/O gastroesophageal reflux (GERD)      2019 novel coronavirus disease (COVID-19)      COVID-19 vaccine series completed      H/O vasectomy  20 yrs ago (2000)              MEDICATIONS:  aspirin enteric coated 81 milliGRAM(s) Oral daily  buMETAnide IVPB 4 milliGRAM(s) IV Intermittent two times a day  clopidogrel Tablet 75 milliGRAM(s) Oral daily  metoprolol succinate ER 25 milliGRAM(s) Oral daily    levoFLOXacin  Tablet 500 milliGRAM(s) Oral every 24 hours      acetaminophen     Tablet .. 650 milliGRAM(s) Oral every 6 hours PRN  melatonin 3 milliGRAM(s) Oral at bedtime PRN  ondansetron Injectable 4 milliGRAM(s) IV Push every 8 hours PRN  oxyCODONE    IR 5 milliGRAM(s) Oral daily PRN    aluminum hydroxide/magnesium hydroxide/simethicone Suspension 30 milliLiter(s) Oral every 4 hours PRN  pantoprazole    Tablet 40 milliGRAM(s) Oral before breakfast    dextrose 50% Injectable 25 Gram(s) IV Push once  dextrose 50% Injectable 25 Gram(s) IV Push once  dextrose 50% Injectable 12.5 Gram(s) IV Push once  dextrose Oral Gel 15 Gram(s) Oral once PRN  glucagon  Injectable 1 milliGRAM(s) IntraMuscular once  insulin glargine Injectable (LANTUS) 30 Unit(s) SubCutaneous at bedtime  insulin lispro (ADMELOG) corrective regimen sliding scale   SubCutaneous three times a day before meals  insulin lispro (ADMELOG) corrective regimen sliding scale   SubCutaneous at bedtime  insulin lispro Injectable (ADMELOG) 10 Unit(s) SubCutaneous three times a day before meals  rosuvastatin 20 milliGRAM(s) Oral at bedtime    ammonium lactate 12% Lotion 1 Application(s) Topical every 12 hours  dextrose 5%. 1000 milliLiter(s) IV Continuous <Continuous>  dextrose 5%. 1000 milliLiter(s) IV Continuous <Continuous>  sodium chloride 0.65% Nasal 2 Spray(s) Both Nostrils four times a day  tamsulosin 0.4 milliGRAM(s) Oral at bedtime      FAMILY HISTORY:  Family history of COPD (chronic obstructive pulmonary disease) (Sibling)    Family history of cardiac disorder  Paternal        Non-contributory    SOCIAL HISTORY:    [ ] not a smoker    Allergies    ceftriaxone (Rash)  doxepin (Other)  Zosyn (Pruritus)  vancomycin (Rash (Mild to Mod))    Intolerances    	    REVIEW OF SYSTEMS:  CONSTITUTIONAL: No fever  EYES: No eye pain, visual disturbances, or discharge  ENMT:  No difficulty hearing, tinnitus  NECK: No pain or stiffness  RESPIRATORY: No cough, wheezing, +SOB  CARDIOVASCULAR: No chest pain, palpitations, passing out, dizziness, or leg swelling  GASTROINTESTINAL:  No nausea, vomiting, diarrhea or constipation. No melena.  GENITOURINARY: No dysuria, hematuria  NEUROLOGICAL: No stroke like symptoms  SKIN: No burning or lesions   ENDOCRINE: No heat or cold intolerance  MUSCULOSKELETAL: No joint pain or swelling  PSYCHIATRIC: No  anxiety, mood swings  HEME/LYMPH: No bleeding gums  ALLERGY AND IMMUNOLOGIC: No hives or eczema	    All other ROS negative    PHYSICAL EXAM:  T(C): 36.7 (08-26-24 @ 20:57), Max: 36.7 (08-26-24 @ 20:57)  HR: 96 (08-26-24 @ 20:57) (87 - 96)  BP: 113/76 (08-26-24 @ 20:57) (92/56 - 113/76)  RR: 18 (08-26-24 @ 20:57) (18 - 18)  SpO2: 95% (08-26-24 @ 20:57) (94% - 97%)  Wt(kg): --  I&O's Summary    25 Aug 2024 07:01  -  26 Aug 2024 07:00  --------------------------------------------------------  IN: 250 mL / OUT: 2600 mL / NET: -2350 mL    26 Aug 2024 07:01  -  26 Aug 2024 23:18  --------------------------------------------------------  IN: 300 mL / OUT: 1300 mL / NET: -1000 mL        Appearance: Normal	  HEENT:   Normal oral mucosa, EOMI	  Cardiovascular:  S1 S2, No JVD,    Respiratory: Lungs clear to auscultation	  Psychiatry: Alert  Gastrointestinal:  Soft, Non-tender, + BS	  Skin: No rashes   Neurologic: Non-focal  Extremities:  No edema  Vascular: Peripheral pulses palpable    	    	  	  CARDIAC MARKERS:  Labs personally reviewed by me                                  9.4    6.53  )-----------( 145      ( 26 Aug 2024 07:13 )             35.2     08-26    140  |  100  |  31<H>  ----------------------------<  138<H>  3.8   |  25  |  1.90<H>    Ca    8.6      26 Aug 2024 07:15  Phos  3.6     08-25  Mg     2.2     08-26            EKG: Personally reviewed by me - 5/30 NSR  Radiology: Personally reviewed by me - CXR Right lower lobe opacity, may represent atelectasis. Underlying infection cannot be excluded.    TTE CONCLUSIONS:      1. Left ventricular cavity is severely dilated. Left ventricular wall thickness is normal. Left ventricular systolic function is severely decreased with an ejection fraction of 22 % by Bell's method of disks. Regional wall motion abnormalities present.   2. Multiple segmental abnormalities exist. See findings.   3. Normal right ventricular cavity size, with normal wall thickness, and probably normal right ventricular systolic function.   4. Estimated pulmonary artery systolic pressure is 30 mmHg.   5. Trace pericardial effusion.        Assessment and Plan:   63M admitted for ADHF, also inappropriate ICD shock       Problem/Plan - 1:  ·  Problem: Acute decompensated systolic heart failure.   ·  Plan: Continue 4mg iv bumex bid, will uptitrate based on response    - TTE 8/26 similar to prior wiith EF 20%  - GDMT Toprol 25mg daily  - Resume Entresto 24/26mg BID as BP tolerates  - Add Aldactone 25mg  - Farxiga/Jardiance 10mg daily     Problem/Plan - 2:  ·  Problem: CAD (coronary artery disease).   ·  Plan: c/w asa and plavix.    Problem/Plan - 3:  ·  Problem: Chronic kidney disease, unspecified CKD stage.   ·  Plan: Monitor BMP daily, dose meds per renal fx, avoid nephrotoxins.    Problem/Plan - 4:  ·  Problem: ICD shock   ·  Plan: Inappropriate as per EP'  - settings adjusted    Problem/Plan - 5:  ·  Problem: Prophylactic measure.   ·  Plan: VTE ppx: hep sq                Differential diagnosis and plan of care discussed with patient after the evaluation. Counseling on diet, nutritional counseling, weight management, exercise and medication compliance was done.   Advanced care planning/advanced directives discussed with patient/family. DNR status including forceful chest compressions to attempt to restart the heart, ventilator support/artificial breathing, electric shock, artificial nutrition, health care proxy, Molst form all discussed with pt. Pt wishes to consider. Sixteen minutes spent on discussing advanced directives.        Shawn Barnes DO [facility]  Cardiovascular Medicine  [address] [place] [address], [address]  Office [phone]  Available via call/text on [employer] Teams

## 2024-08-26 NOTE — PHYSICAL THERAPY INITIAL EVALUATION ADULT - PERTINENT HX OF CURRENT PROBLEM, REHAB EVAL
64 y/o Male with PMHx of HFrEF s/p ICD, HTN, T2DM, s/p LLE amputation, initially presented s/p ICD shock 30mins prior to ED arrival. Pt reports he has been endorsing progressively worsening GREWAL, orthopnea, and SOB for the past few days, now admitted for acute decompensated heart failure. ENT consulted for left epistaxis that occurred s/p fall from inpt bed. Pt states he was laying in the bed when he suddenly developed a severe coughing episode and then found himself on the floor, states he fell on his face. nasopore packing was placed in left nare. No further reports of bleeding. Pt c/o dry mouth and nasal congestion. On exam, left nare nasopore packing in place, no active epistaxis, oropharynx clear. CT maxillofacial shows no acute facial fracture, soft tissue swelling around nasal bones.

## 2024-08-26 NOTE — PHYSICAL THERAPY INITIAL EVALUATION ADULT - GAIT TRAINING, PT EVAL
GOAL: Patient will ambulate 30 feet independently with least restrictive assistive device in 2 weeks.

## 2024-08-26 NOTE — PROVIDER CONTACT NOTE (HYPOGLYCEMIA EVENT) - NS PROVIDER CONTACT BACKGROUND-HYPO
Age: 63y    Gender: Male    POCT Blood Glucose:  119 mg/dL (08-26-24 @ 17:56)  62 mg/dL (08-26-24 @ 17:19)  67 mg/dL (08-26-24 @ 17:18)  140 mg/dL (08-26-24 @ 11:38)  152 mg/dL (08-26-24 @ 07:31)  160 mg/dL (08-25-24 @ 23:52)      eMAR:  insulin glargine Injectable (LANTUS)   40 Unit(s) SubCutaneous (08-26-24 @ 00:01)    insulin lispro (ADMELOG) corrective regimen sliding scale   1  SubCutaneous (08-26-24 @ 08:30)    insulin lispro Injectable (ADMELOG)   15 Unit(s) SubCutaneous (08-26-24 @ 12:06)   15 Unit(s) SubCutaneous (08-26-24 @ 08:30)    rosuvastatin   20 milliGRAM(s) Oral (08-25-24 @ 21:35)

## 2024-08-26 NOTE — PROGRESS NOTE ADULT - ASSESSMENT
62 y/o Male with PMHx of HFrEF s/p ICD, HTN, T2DM, s/p LLE amputation, initially presented s/p ICD shock 30mins prior to ED arrival. Pt reports he has been endorsing progressively worsening GREWAL, orthopnea, and SOB for the past few days, now admitted for acute decompensated heart failure. ENT consulted for left epistaxis that occurred s/p fall from inpt bed. Pt states he was laying in the bed when he suddenly developed a severe coughing episode and then found himself on the floor, states he fell on his face. nasopore packing was placed in left nare. No further reports of bleeding. Pt c/o dry mouth and nasal congestion. On exam, left nare nasopore packing in place, no active epistaxis, oropharynx clear. CT maxillofacial shows no acute facial fracture, soft tissue swelling around nasal bones.

## 2024-08-26 NOTE — CONSULT NOTE ADULT - SUBJECTIVE AND OBJECTIVE BOX
Wound SURGERY CONSULT NOTE    HPI:  63M PMHx HFrEF s/p ICD, HTN, T2DM, s/p LLE amputation   Pt presented w/ ICD shock 30min prior to arrival to ED. Pt reports he's been having progressively worsening GREWAL, orthopnea and SOB for the past few days.   In the ED, BP on the softer side, otherwise VSS. OK on RA. CBC w/ wbc 7.2, hgb 9.3, plt 147.   CMP w/ SCr 1.66, LFT unremarkable. Trop 77, 74. proBNP 3255. CXR w/ RLL opacity, cannot r/o infection.   ICD interrogated by EP in the ED, ICD oversensing and shock was inappropriate. Pt also found w/ new RBBB.   ED rec c/w beta blockers. Pt received full dose asa and bumex in the ED for fluid overload.   Admit for ADHF.  (25 Aug 2024 00:08)      Wound consult requested by team to assist w/ management of  RLE wound. Pt w/o  c/o pain, drainage, odor, color change Pt w/co worsening swelling. Offloading and pericare initiated upon admission as pt Increasingly sedentary 2/2 to illness. Pt is continent of urine & stool.   Appetite good. Patient's spouse on phone patient sits in recliner and states he has not been to bed in  4 years  all questions asked and answered to pt's and family's expressed understanding and satisfaction.    Current Diet: Diet, DASH/TLC:   Sodium & Cholesterol Restricted  Consistent Carbohydrate No Snacks (CSTCHO)  1500mL Fluid Restriction ([id number])     Special Instructions for Nursin milliLiter(s) to 2000 milliLiter(s) fluid restriction (24 @ 00:19)      PAST MEDICAL & SURGICAL HISTORY:  Stented coronary artery      Diabetes      AICD (automatic cardioverter/defibrillator) present      Hypertension      Heart failure with reduced ejection fraction      History of ischemic cardiomyopathy      History of COPD      H/O gastroesophageal reflux (GERD)      2019 novel coronavirus disease (COVID-19)      COVID-19 vaccine series completed      H/O vasectomy  20 yrs ago ()          REVIEW OF SYSTEMS:   General/ Breast/ Skin/Vasc/ Neuro/ MSK: see HPI  All other systems negative    MEDICATIONS  (STANDING):  ammonium lactate 12% Lotion 1 Application(s) Topical every 12 hours  aspirin enteric coated 81 milliGRAM(s) Oral daily  buMETAnide IVPB 4 milliGRAM(s) IV Intermittent two times a day  clopidogrel Tablet 75 milliGRAM(s) Oral daily  dextrose 5%. 1000 milliLiter(s) (50 mL/Hr) IV Continuous <Continuous>  dextrose 5%. 1000 milliLiter(s) (100 mL/Hr) IV Continuous <Continuous>  dextrose 50% Injectable 25 Gram(s) IV Push once  dextrose 50% Injectable 25 Gram(s) IV Push once  dextrose 50% Injectable 12.5 Gram(s) IV Push once  glucagon  Injectable 1 milliGRAM(s) IntraMuscular once  insulin glargine Injectable (LANTUS) 40 Unit(s) SubCutaneous at bedtime  insulin lispro (ADMELOG) corrective regimen sliding scale   SubCutaneous three times a day before meals  insulin lispro (ADMELOG) corrective regimen sliding scale   SubCutaneous at bedtime  insulin lispro Injectable (ADMELOG) 15 Unit(s) SubCutaneous three times a day before meals  levoFLOXacin  Tablet 500 milliGRAM(s) Oral every 24 hours  metoprolol succinate ER 25 milliGRAM(s) Oral daily  pantoprazole    Tablet 40 milliGRAM(s) Oral before breakfast  rosuvastatin 20 milliGRAM(s) Oral at bedtime  sacubitril 24 mG/valsartan 26 mG 1 Tablet(s) Oral two times a day  sodium chloride 0.65% Nasal 2 Spray(s) Both Nostrils four times a day  tamsulosin 0.4 milliGRAM(s) Oral at bedtime    MEDICATIONS  (PRN):  acetaminophen     Tablet .. 650 milliGRAM(s) Oral every 6 hours PRN Temp greater or equal to 38C (100.4F), Mild Pain (1 - 3)  aluminum hydroxide/magnesium hydroxide/simethicone Suspension 30 milliLiter(s) Oral every 4 hours PRN Dyspepsia  dextrose Oral Gel 15 Gram(s) Oral once PRN Blood Glucose LESS THAN 70 milliGRAM(s)/deciliter  melatonin 3 milliGRAM(s) Oral at bedtime PRN Insomnia  ondansetron Injectable 4 milliGRAM(s) IV Push every 8 hours PRN Nausea and/or Vomiting  oxyCODONE    IR 5 milliGRAM(s) Oral daily PRN Severe Pain (7 - 10)      Allergies    ceftriaxone (Rash)  doxepin (Other)  Zosyn (Pruritus)  vancomycin (Rash (Mild to Mod))    Intolerances        SOCIAL HISTORY:       No hx of smoking, ETOH, drugs    FAMILY HISTORY:    Family history of COPD (chronic obstructive pulmonary disease) (Sibling)    Family history of cardiac disorder  Paternal      PHYSICAL EXAM:  Vital Signs Last 24 Hrs  T(C): 36.4 (26 Aug 2024 11:25), Max: 36.6 (25 Aug 2024 13:30)  T(F): 97.5 (26 Aug 2024 11:25), Max: 97.8 (25 Aug 2024 13:30)  HR: 95 (26 Aug 2024 11:25) (91 - 109)  BP: 101/68 (26 Aug 2024 11:25) (92/56 - 110/76)  BP(mean): --  RR: 18 (26 Aug 2024 11:25) (18 - 20)  SpO2: 96% (26 Aug 2024 11:25) (93% - 96%)    Parameters below as of 26 Aug 2024 11:25  Patient On (Oxygen Delivery Method): room air        NAD, A&Ox3/  Obese  Versa Care P500   HEENT:  sclera clear, mucosa moist, throat clear, trachea midline, neck supple  Respiratory: nonlabored w/ equal chest rise  Gastrointestinal: soft NT/ND  Neurology:  verbal,  follows commands  Psych: calm/ appropriate  Musculoskeletal:  limited stiff /FROM, no deformities/ contractures  Vascular: BLE equally warm,  no cyanosis, clubbing, nor acute ischemia                LT BKA pt has prosthetic           RLE edema           RLE DP pulse not palpable          RLE hemosiderin staining/ varicose veins           RLE xerosis, scattered scabs there is no blistering, drainage            No odor, no increased warmth, tenderness, induration, fluctuance, nor crepitus  Skin: Moist with good turger, sacral bilateral buttocks hyperpigmentation    LABS/ CULTURES/ RADIOLOGY:                        9.4    6.53  )-----------( 145      ( 26 Aug 2024 07:13 )             35.2       140  |  100  |  31  ----------------------------<  138      [24 @ 07:15]  3.8   |  25  |  1.90        Ca     8.6     [24 @ 07:15]      Mg     2.2     [24 @ 07:15]      Phos  3.6     [24 @ 06:50]    TPro  7.2  /  Alb  3.6  /  TBili  0.6  /  DBili  x   /  AST  18  /  ALT  8   /  AlkPhos  115  [24 @ 20:03]            A1C with Estimated Average Glucose Result: 7.5 % (24 @ 06:50)

## 2024-08-26 NOTE — PROGRESS NOTE ADULT - SUBJECTIVE AND OBJECTIVE BOX
HPI:  63M PMHx HFrEF s/p ICD, HTN, T2DM, s/p LLE amputation   Pt presented w/ ICD shock 30min prior to arrival to ED. Pt reports he's been having progressively worsening GREWAL, orthopnea and SOB for the past few days.     In the ED, BP on the softer side, otherwise VSS. OK on RA.   CBC w/ wbc 7.2, hgb 9.3, plt 147.   CMP w/ SCr 1.66, LFT unremarkable. Trop 77, 74. proBNP 3255.   CXR w/ RLL opacity, cannot r/o infection.     ICD interrogated by EP in the ED, ICD oversensing and shock was inappropriate.   Pt also found w/ new RBBB.   ED rec c/w beta blockers.   Pt received full dose asa and bumex in the ED for fluid overload.   Admit for ADHF.  (25 Aug 2024 00:08)      PAST MEDICAL & SURGICAL HISTORY:  Stented coronary artery      Diabetes      AICD (automatic cardioverter/defibrillator) present      Hypertension      Heart failure with reduced ejection fraction      History of ischemic cardiomyopathy      History of COPD      H/O gastroesophageal reflux (GERD)      2019 novel coronavirus disease (COVID-19)      COVID-19 vaccine series completed      H/O vasectomy  20 yrs ago (2000)          Review of Systems:   CONSTITUTIONAL: No fever, weight loss, or fatigue  EYES: No eye pain, visual disturbances, or discharge  ENMT:  No difficulty hearing, tinnitus, vertigo; No sinus or throat pain  NECK: No pain or stiffness  BREASTS: No pain, masses, or nipple discharge  RESPIRATORY: No cough, wheezing, chills or hemoptysis; No shortness of breath  CARDIOVASCULAR: No chest pain, palpitations, dizziness, or leg swelling  GASTROINTESTINAL: No abdominal or epigastric pain. No nausea, vomiting, or hematemesis; No diarrhea or constipation. No melena or hematochezia.  GENITOURINARY: No dysuria, frequency, hematuria, or incontinence  NEUROLOGICAL: No headaches, memory loss, loss of strength, numbness, or tremors  SKIN: No itching, burning, rashes, or lesions   LYMPH NODES: No enlarged glands  ENDOCRINE: No heat or cold intolerance; No hair loss  MUSCULOSKELETAL: No joint pain or swelling; No muscle, back, or extremity pain  PSYCHIATRIC: No depression, anxiety, mood swings, or difficulty sleeping  HEME/LYMPH: No easy bruising, or bleeding gums  ALLERY AND IMMUNOLOGIC: No hives or eczema    Allergies    ceftriaxone (Rash)  doxepin (Other)  Zosyn (Pruritus)  vancomycin (Rash (Mild to Mod))    Intolerances        Social History:     FAMILY HISTORY:  Family history of COPD (chronic obstructive pulmonary disease) (Sibling)    Family history of cardiac disorder  Paternal        MEDICATIONS  (STANDING):  ammonium lactate 12% Lotion 1 Application(s) Topical every 12 hours  aspirin enteric coated 81 milliGRAM(s) Oral daily  buMETAnide IVPB 4 milliGRAM(s) IV Intermittent two times a day  clopidogrel Tablet 75 milliGRAM(s) Oral daily  dextrose 5%. 1000 milliLiter(s) (100 mL/Hr) IV Continuous <Continuous>  dextrose 5%. 1000 milliLiter(s) (50 mL/Hr) IV Continuous <Continuous>  dextrose 50% Injectable 25 Gram(s) IV Push once  dextrose 50% Injectable 12.5 Gram(s) IV Push once  dextrose 50% Injectable 25 Gram(s) IV Push once  glucagon  Injectable 1 milliGRAM(s) IntraMuscular once  insulin glargine Injectable (LANTUS) 30 Unit(s) SubCutaneous at bedtime  insulin lispro (ADMELOG) corrective regimen sliding scale   SubCutaneous three times a day before meals  insulin lispro (ADMELOG) corrective regimen sliding scale   SubCutaneous at bedtime  insulin lispro Injectable (ADMELOG) 10 Unit(s) SubCutaneous three times a day before meals  levoFLOXacin  Tablet 500 milliGRAM(s) Oral every 24 hours  metoprolol succinate ER 25 milliGRAM(s) Oral daily  pantoprazole    Tablet 40 milliGRAM(s) Oral before breakfast  rosuvastatin 20 milliGRAM(s) Oral at bedtime  sodium chloride 0.65% Nasal 2 Spray(s) Both Nostrils four times a day  tamsulosin 0.4 milliGRAM(s) Oral at bedtime    MEDICATIONS  (PRN):  acetaminophen     Tablet .. 650 milliGRAM(s) Oral every 6 hours PRN Temp greater or equal to 38C (100.4F), Mild Pain (1 - 3)  aluminum hydroxide/magnesium hydroxide/simethicone Suspension 30 milliLiter(s) Oral every 4 hours PRN Dyspepsia  dextrose Oral Gel 15 Gram(s) Oral once PRN Blood Glucose LESS THAN 70 milliGRAM(s)/deciliter  melatonin 3 milliGRAM(s) Oral at bedtime PRN Insomnia  ondansetron Injectable 4 milliGRAM(s) IV Push every 8 hours PRN Nausea and/or Vomiting  oxyCODONE    IR 5 milliGRAM(s) Oral daily PRN Severe Pain (7 - 10)        CAPILLARY BLOOD GLUCOSE      POCT Blood Glucose.: 119 mg/dL (26 Aug 2024 17:56)  POCT Blood Glucose.: 62 mg/dL (26 Aug 2024 17:19)  POCT Blood Glucose.: 67 mg/dL (26 Aug 2024 17:18)  POCT Blood Glucose.: 140 mg/dL (26 Aug 2024 11:38)  POCT Blood Glucose.: 152 mg/dL (26 Aug 2024 07:31)  POCT Blood Glucose.: 160 mg/dL (25 Aug 2024 23:52)    I&O's Summary    25 Aug 2024 07:01  -  26 Aug 2024 07:00  --------------------------------------------------------  IN: 250 mL / OUT: 2600 mL / NET: -2350 mL    26 Aug 2024 07:01  -  26 Aug 2024 19:25  --------------------------------------------------------  IN: 300 mL / OUT: 900 mL / NET: -600 mL        PHYSICAL EXAM:  GENERAL: NAD, well-developed  HEAD:  Atraumatic, Normocephalic  EYES: EOMI, PERRLA, conjunctiva and sclera clear  NECK: Supple, No JVD  CHEST/LUNG: Clear to auscultation bilaterally; No wheeze  HEART: Regular rate and rhythm; No murmurs, rubs, or gallops  ABDOMEN: Soft, Nontender, Nondistended; Bowel sounds present  EXTREMITIES:  2+ Peripheral Pulses, No clubbing, cyanosis, or edema  PSYCH: AAOx3  NEUROLOGY: non-focal  SKIN: No rashes or lesions    LABS:                        9.4    6.53  )-----------( 145      ( 26 Aug 2024 07:13 )             35.2     08-26    140  |  100  |  31<H>  ----------------------------<  138<H>  3.8   |  25  |  1.90<H>    Ca    8.6      26 Aug 2024 07:15  Phos  3.6     08-25  Mg     2.2     08-26    TPro  7.2  /  Alb  3.6  /  TBili  0.6  /  DBili  x   /  AST  18  /  ALT  8<L>  /  AlkPhos  115  08-24          Urinalysis Basic - ( 26 Aug 2024 07:15 )    Color: x / Appearance: x / SG: x / pH: x  Gluc: 138 mg/dL / Ketone: x  / Bili: x / Urobili: x   Blood: x / Protein: x / Nitrite: x   Leuk Esterase: x / RBC: x / WBC x   Sq Epi: x / Non Sq Epi: x / Bacteria: x        RADIOLOGY & ADDITIONAL TESTS:    Imaging Personally Reviewed:    Consultant(s) Notes Reviewed:      Care Discussed with Consultants/Other Providers:

## 2024-08-27 LAB
ANION GAP SERPL CALC-SCNC: 13 MMOL/L — SIGNIFICANT CHANGE UP (ref 5–17)
BUN SERPL-MCNC: 36 MG/DL — HIGH (ref 7–23)
CALCIUM SERPL-MCNC: 8.5 MG/DL — SIGNIFICANT CHANGE UP (ref 8.4–10.5)
CHLORIDE SERPL-SCNC: 101 MMOL/L — SIGNIFICANT CHANGE UP (ref 96–108)
CO2 SERPL-SCNC: 26 MMOL/L — SIGNIFICANT CHANGE UP (ref 22–31)
CREAT SERPL-MCNC: 1.87 MG/DL — HIGH (ref 0.5–1.3)
EGFR: 40 ML/MIN/1.73M2 — LOW
GLUCOSE BLDC GLUCOMTR-MCNC: 104 MG/DL — HIGH (ref 70–99)
GLUCOSE BLDC GLUCOMTR-MCNC: 129 MG/DL — HIGH (ref 70–99)
GLUCOSE BLDC GLUCOMTR-MCNC: 150 MG/DL — HIGH (ref 70–99)
GLUCOSE BLDC GLUCOMTR-MCNC: 162 MG/DL — HIGH (ref 70–99)
GLUCOSE SERPL-MCNC: 117 MG/DL — HIGH (ref 70–99)
HCT VFR BLD CALC: 34.8 % — LOW (ref 39–50)
HGB BLD-MCNC: 9.5 G/DL — LOW (ref 13–17)
MAGNESIUM SERPL-MCNC: 2.3 MG/DL — SIGNIFICANT CHANGE UP (ref 1.6–2.6)
MCHC RBC-ENTMCNC: 19.6 PG — LOW (ref 27–34)
MCHC RBC-ENTMCNC: 27.3 GM/DL — LOW (ref 32–36)
MCV RBC AUTO: 71.8 FL — LOW (ref 80–100)
NRBC # BLD: 0 /100 WBCS — SIGNIFICANT CHANGE UP (ref 0–0)
PLATELET # BLD AUTO: 154 K/UL — SIGNIFICANT CHANGE UP (ref 150–400)
POTASSIUM SERPL-MCNC: 3.8 MMOL/L — SIGNIFICANT CHANGE UP (ref 3.5–5.3)
POTASSIUM SERPL-SCNC: 3.8 MMOL/L — SIGNIFICANT CHANGE UP (ref 3.5–5.3)
RBC # BLD: 4.85 M/UL — SIGNIFICANT CHANGE UP (ref 4.2–5.8)
RBC # FLD: 20.6 % — HIGH (ref 10.3–14.5)
SODIUM SERPL-SCNC: 140 MMOL/L — SIGNIFICANT CHANGE UP (ref 135–145)
WBC # BLD: 6.36 K/UL — SIGNIFICANT CHANGE UP (ref 3.8–10.5)
WBC # FLD AUTO: 6.36 K/UL — SIGNIFICANT CHANGE UP (ref 3.8–10.5)

## 2024-08-27 PROCEDURE — 99233 SBSQ HOSP IP/OBS HIGH 50: CPT

## 2024-08-27 RX ORDER — POTASSIUM CHLORIDE 10 MEQ
20 TABLET, EXT RELEASE, PARTICLES/CRYSTALS ORAL ONCE
Refills: 0 | Status: COMPLETED | OUTPATIENT
Start: 2024-08-27 | End: 2024-08-27

## 2024-08-27 RX ADMIN — Medication 75 MILLIGRAM(S): at 12:42

## 2024-08-27 RX ADMIN — SACUBITRIL AND VALSARTAN 1 TABLET(S): 49; 51 TABLET, FILM COATED ORAL at 17:12

## 2024-08-27 RX ADMIN — BUMETANIDE 132 MILLIGRAM(S): 2 TABLET ORAL at 06:56

## 2024-08-27 RX ADMIN — Medication 2 SPRAY(S): at 06:58

## 2024-08-27 RX ADMIN — Medication 10 UNIT(S): at 17:20

## 2024-08-27 RX ADMIN — Medication 81 MILLIGRAM(S): at 12:47

## 2024-08-27 RX ADMIN — Medication 40 MILLIGRAM(S): at 06:55

## 2024-08-27 RX ADMIN — Medication 10 UNIT(S): at 08:15

## 2024-08-27 RX ADMIN — Medication 1 APPLICATION(S): at 06:54

## 2024-08-27 RX ADMIN — OXYCODONE HYDROCHLORIDE 5 MILLIGRAM(S): 5 TABLET ORAL at 22:45

## 2024-08-27 RX ADMIN — TAMSULOSIN HYDROCHLORIDE 0.4 MILLIGRAM(S): 0.4 CAPSULE ORAL at 21:39

## 2024-08-27 RX ADMIN — Medication 20 MILLIEQUIVALENT(S): at 18:44

## 2024-08-27 RX ADMIN — SACUBITRIL AND VALSARTAN 1 TABLET(S): 49; 51 TABLET, FILM COATED ORAL at 06:55

## 2024-08-27 RX ADMIN — OXYCODONE HYDROCHLORIDE 5 MILLIGRAM(S): 5 TABLET ORAL at 21:39

## 2024-08-27 RX ADMIN — BUMETANIDE 132 MILLIGRAM(S): 2 TABLET ORAL at 17:11

## 2024-08-27 RX ADMIN — Medication 10 UNIT(S): at 12:41

## 2024-08-27 RX ADMIN — ROSUVASTATIN CALCIUM 20 MILLIGRAM(S): 10 TABLET ORAL at 21:39

## 2024-08-27 RX ADMIN — Medication 1 APPLICATION(S): at 17:12

## 2024-08-27 RX ADMIN — METOPROLOL TARTRATE 25 MILLIGRAM(S): 100 TABLET ORAL at 06:55

## 2024-08-27 RX ADMIN — INSULIN GLARGINE 30 UNIT(S): 100 INJECTION, SOLUTION SUBCUTANEOUS at 21:41

## 2024-08-27 RX ADMIN — BUMETANIDE 132 MILLIGRAM(S): 2 TABLET ORAL at 13:59

## 2024-08-27 NOTE — DIETITIAN INITIAL EVALUATION ADULT - NSFNSPHYEXAMSKINFT_GEN_A_CORE
Per Wound Care note 8/26: RLE xerosis  no pressure injury or deep tissue injury noted.
DC instructions

## 2024-08-27 NOTE — DIETITIAN INITIAL EVALUATION ADULT - PERSON TAUGHT/METHOD
Encouraged patient to continue monitoring sugar intake at home for adequate DM control.  Patient was visited by RD for CHF education. Heart failure education provided to the patient in detail. Discussed heart failure nutrition therapy, sodium and fluid intake, importance of diet adherence, daily weights monitoring with the patient. Reinforced importance of weight gain parameters and importance of contacting MD’s about weight changes. Provided handouts on heart failure nutrition therapy, reading heart healthy nutrition labels, heart healthy shopping tips and sodium (salt) content of foods. Patient verbalized understanding demonstrated by teach back method.   Pt made aware of menu ordering procedure in house w/ menu provided, encourage pt to order preferred foods to optimize PO intake while in house./verbal instruction/written material/patient instructed

## 2024-08-27 NOTE — PROGRESS NOTE ADULT - SUBJECTIVE AND OBJECTIVE BOX
HPI:  63M PMHx HFrEF s/p ICD, HTN, T2DM, s/p LLE amputation   Pt presented w/ ICD shock 30min prior to arrival to ED. Pt reports he's been having progressively worsening GREWAL, orthopnea and SOB for the past few days.     In the ED, BP on the softer side, otherwise VSS. OK on RA.   CBC w/ wbc 7.2, hgb 9.3, plt 147.   CMP w/ SCr 1.66, LFT unremarkable. Trop 77, 74. proBNP 3255.   CXR w/ RLL opacity, cannot r/o infection.       T(C): 36.6 (08-27-24 @ 20:46), Max: 36.9 (08-27-24 @ 12:18)  HR: 98 (08-27-24 @ 20:46) (85 - 98)  BP: 122/66 (08-27-24 @ 20:46) (94/59 - 122/66)  RR: 18 (08-27-24 @ 20:46) (18 - 18)  SpO2: 93% (08-27-24 @ 20:46) (93% - 97%)      MEDICATIONS  (STANDING):  ammonium lactate 12% Lotion 1 Application(s) Topical every 12 hours  aspirin enteric coated 81 milliGRAM(s) Oral daily  buMETAnide IVPB 4 milliGRAM(s) IV Intermittent <User Schedule>  clopidogrel Tablet 75 milliGRAM(s) Oral daily  dextrose 5%. 1000 milliLiter(s) (100 mL/Hr) IV Continuous <Continuous>  dextrose 5%. 1000 milliLiter(s) (50 mL/Hr) IV Continuous <Continuous>  dextrose 50% Injectable 25 Gram(s) IV Push once  dextrose 50% Injectable 25 Gram(s) IV Push once  dextrose 50% Injectable 12.5 Gram(s) IV Push once  glucagon  Injectable 1 milliGRAM(s) IntraMuscular once  insulin glargine Injectable (LANTUS) 30 Unit(s) SubCutaneous at bedtime  insulin lispro (ADMELOG) corrective regimen sliding scale   SubCutaneous three times a day before meals  insulin lispro (ADMELOG) corrective regimen sliding scale   SubCutaneous at bedtime  insulin lispro Injectable (ADMELOG) 10 Unit(s) SubCutaneous three times a day before meals  levoFLOXacin  Tablet 500 milliGRAM(s) Oral every 24 hours  metoprolol succinate ER 25 milliGRAM(s) Oral daily  pantoprazole    Tablet 40 milliGRAM(s) Oral before breakfast  rosuvastatin 20 milliGRAM(s) Oral at bedtime  sacubitril 24 mG/valsartan 26 mG 1 Tablet(s) Oral every 12 hours  sodium chloride 0.65% Nasal 2 Spray(s) Both Nostrils four times a day  tamsulosin 0.4 milliGRAM(s) Oral at bedtime    MEDICATIONS  (PRN):  acetaminophen     Tablet .. 650 milliGRAM(s) Oral every 6 hours PRN Temp greater or equal to 38C (100.4F), Mild Pain (1 - 3)  aluminum hydroxide/magnesium hydroxide/simethicone Suspension 30 milliLiter(s) Oral every 4 hours PRN Dyspepsia  dextrose Oral Gel 15 Gram(s) Oral once PRN Blood Glucose LESS THAN 70 milliGRAM(s)/deciliter  melatonin 3 milliGRAM(s) Oral at bedtime PRN Insomnia  ondansetron Injectable 4 milliGRAM(s) IV Push every 8 hours PRN Nausea and/or Vomiting  oxyCODONE    IR 5 milliGRAM(s) Oral daily PRN Severe Pain (7 - 10)        PAST MEDICAL & SURGICAL HISTORY:  Stented coronary artery      Diabetes      AICD (automatic cardioverter/defibrillator) present      Hypertension      Heart failure with reduced ejection fraction      History of ischemic cardiomyopathy      History of COPD      H/O gastroesophageal reflux (GERD)      2019 novel coronavirus disease (COVID-19)      COVID-19 vaccine series completed      H/O vasectomy  20 yrs ago (2000)          Review of Systems:   CONSTITUTIONAL: No fever, weight loss, or fatigue  EYES: No eye pain, visual disturbances, or discharge  ENMT:  No difficulty hearing, tinnitus, vertigo; No sinus or throat pain  NECK: No pain or stiffness  BREASTS: No pain, masses, or nipple discharge  RESPIRATORY: No cough, wheezing, chills or hemoptysis; No shortness of breath  CARDIOVASCULAR: No chest pain, palpitations, dizziness, or leg swelling  GASTROINTESTINAL: No abdominal or epigastric pain. No nausea, vomiting, or hematemesis; No diarrhea or constipation. No melena or hematochezia.  GENITOURINARY: No dysuria, frequency, hematuria, or incontinence  NEUROLOGICAL: No headaches, memory loss, loss of strength, numbness, or tremors  SKIN: No itching, burning, rashes, or lesions   LYMPH NODES: No enlarged glands  ENDOCRINE: No heat or cold intolerance; No hair loss  MUSCULOSKELETAL: No joint pain or swelling; No muscle, back, or extremity pain  PSYCHIATRIC: No depression, anxiety, mood swings, or difficulty sleeping  HEME/LYMPH: No easy bruising, or bleeding gums  ALLERY AND IMMUNOLOGIC: No hives or eczema    Allergies    ceftriaxone (Rash)  doxepin (Other)  Zosyn (Pruritus)  vancomycin (Rash (Mild to Mod))    Intolerances        Social History:     FAMILY HISTORY:  Family history of COPD (chronic obstructive pulmonary disease) (Sibling)    Family history of cardiac disorder  Paternal    T(C): 36.6 (08-27-24 @ 20:46), Max: 36.9 (08-27-24 @ 12:18)  HR: 98 (08-27-24 @ 20:46) (85 - 98)  BP: 122/66 (08-27-24 @ 20:46) (94/59 - 122/66)  RR: 18 (08-27-24 @ 20:46) (18 - 18)  SpO2: 93% (08-27-24 @ 20:46) (93% - 97%)      MEDICATIONS  (STANDING):  ammonium lactate 12% Lotion 1 Application(s) Topical every 12 hours  aspirin enteric coated 81 milliGRAM(s) Oral daily  buMETAnide IVPB 4 milliGRAM(s) IV Intermittent <User Schedule>  clopidogrel Tablet 75 milliGRAM(s) Oral daily  dextrose 5%. 1000 milliLiter(s) (100 mL/Hr) IV Continuous <Continuous>  dextrose 5%. 1000 milliLiter(s) (50 mL/Hr) IV Continuous <Continuous>  dextrose 50% Injectable 25 Gram(s) IV Push once  dextrose 50% Injectable 25 Gram(s) IV Push once  dextrose 50% Injectable 12.5 Gram(s) IV Push once  glucagon  Injectable 1 milliGRAM(s) IntraMuscular once  insulin glargine Injectable (LANTUS) 30 Unit(s) SubCutaneous at bedtime  insulin lispro (ADMELOG) corrective regimen sliding scale   SubCutaneous three times a day before meals  insulin lispro (ADMELOG) corrective regimen sliding scale   SubCutaneous at bedtime  insulin lispro Injectable (ADMELOG) 10 Unit(s) SubCutaneous three times a day before meals  levoFLOXacin  Tablet 500 milliGRAM(s) Oral every 24 hours  metoprolol succinate ER 25 milliGRAM(s) Oral daily  pantoprazole    Tablet 40 milliGRAM(s) Oral before breakfast  rosuvastatin 20 milliGRAM(s) Oral at bedtime  sacubitril 24 mG/valsartan 26 mG 1 Tablet(s) Oral every 12 hours  sodium chloride 0.65% Nasal 2 Spray(s) Both Nostrils four times a day  tamsulosin 0.4 milliGRAM(s) Oral at bedtime        PHYSICAL EXAM:  GENERAL: NAD, well-developed  HEAD:  Atraumatic, Normocephalic  EYES: EOMI, PERRLA, conjunctiva and sclera clear  NECK: Supple, No JVD  CHEST/LUNG: Clear to auscultation bilaterally; No wheeze  HEART: Regular rate and rhythm; No murmurs, rubs, or gallops  ABDOMEN: Soft, Nontender, Nondistended; Bowel sounds present  EXTREMITIES:  2+ Peripheral Pulses, No clubbing, cyanosis, or edema  PSYCH: AAOx3  NEUROLOGY: non-focal  SKIN: No rashes or lesions    LABS:                        9.4    6.53  )-----------( 145      ( 26 Aug 2024 07:13 )             35.2     08-26    140  |  100  |  31<H>  ----------------------------<  138<H>  3.8   |  25  |  1.90<H>    Ca    8.6      26 Aug 2024 07:15  Phos  3.6     08-25  Mg     2.2     08-26    TPro  7.2  /  Alb  3.6  /  TBili  0.6  /  DBili  x   /  AST  18  /  ALT  8<L>  /  AlkPhos  115  08-24          Urinalysis Basic - ( 26 Aug 2024 07:15 )    Color: x / Appearance: x / SG: x / pH: x  Gluc: 138 mg/dL / Ketone: x  / Bili: x / Urobili: x   Blood: x / Protein: x / Nitrite: x   Leuk Esterase: x / RBC: x / WBC x   Sq Epi: x / Non Sq Epi: x / Bacteria: x        RADIOLOGY & ADDITIONAL TESTS:    Imaging Personally Reviewed:    Consultant(s) Notes Reviewed:      Care Discussed with Consultants/Other Providers:

## 2024-08-27 NOTE — DIETITIAN INITIAL EVALUATION ADULT - ORAL INTAKE PTA/DIET HISTORY
Patient reports very good appetite and PO intake PTA. Eats 3 meals/day with snacks in between. Limits sugar in setting of DM, does not add salt to foods. Knows of importance/reasoning for sodium restricted diet but shares that he likes to indulge in foods high in salt and fat such as cold cuts, italian meats, cheeses. Patient reports he cooks at home. Confirms no known food allergies. Not taking any oral nutrition supplements PTA.

## 2024-08-27 NOTE — DIETITIAN INITIAL EVALUATION ADULT - ENERGY INTAKE
Adequate (%) Reports good PO intake, RD observed 100% intake of lunch tray, patient reports still feeling hungry and wife sometimes brings foods from home.

## 2024-08-27 NOTE — PROGRESS NOTE ADULT - SUBJECTIVE AND OBJECTIVE BOX
DATE OF SERVICE: 08-27-24 @ 10:12    Patient is a 63y old  Male who presents with a chief complaint of CHF (26 Aug 2024 12:18)      INTERVAL HISTORY: Feels ok. Anxious for DC.     REVIEW OF SYSTEMS:  CONSTITUTIONAL: No weakness  EYES/ENT: No visual changes;  No throat pain   NECK: No pain or stiffness  RESPIRATORY: No cough, wheezing; No shortness of breath  CARDIOVASCULAR: No chest pain or palpitations  GASTROINTESTINAL: No abdominal  pain. No nausea, vomiting, or hematemesis  GENITOURINARY: No dysuria, frequency or hematuria  NEUROLOGICAL: No stroke like symptoms  SKIN: No rashes    TELEMETRY Personally reviewed: SR 1st AVB with 6 beats of WCT (8/26)  	  MEDICATIONS:  buMETAnide IVPB 4 milliGRAM(s) IV Intermittent two times a day  metoprolol succinate ER 25 milliGRAM(s) Oral daily  sacubitril 24 mG/valsartan 26 mG 1 Tablet(s) Oral every 12 hours        PHYSICAL EXAM:  T(C): 36.5 (08-27-24 @ 04:20), Max: 36.7 (08-26-24 @ 20:57)  HR: 85 (08-27-24 @ 06:51) (85 - 98)  BP: 107/75 (08-27-24 @ 06:51) (93/59 - 113/76)  RR: 18 (08-27-24 @ 04:20) (18 - 18)  SpO2: 95% (08-27-24 @ 04:20) (95% - 97%)  Wt(kg): --  I&O's Summary    26 Aug 2024 07:01  -  27 Aug 2024 07:00  --------------------------------------------------------  IN: 300 mL / OUT: 3500 mL / NET: -3200 mL    27 Aug 2024 07:01  -  27 Aug 2024 10:12  --------------------------------------------------------  IN: 240 mL / OUT: 750 mL / NET: -510 mL          Appearance: In no distress	  HEENT:    PERRL, EOMI	  Cardiovascular:  S1 S2, No JVD  Respiratory: Lungs clear to auscultation	  Gastrointestinal:  Soft, Non-tender, + BS	  Vascularature:  L BKA, + RLE edema  Psychiatric: Appropriate affect   Neuro: no acute focal deficits                               9.5    6.36  )-----------( 154      ( 27 Aug 2024 06:38 )             34.8     08-27    140  |  101  |  36<H>  ----------------------------<  117<H>  3.8   |  26  |  1.87<H>    Ca    8.5      27 Aug 2024 06:38  Mg     2.3     08-27          Labs personally reviewed      ASSESSMENT/PLAN: 	    63M admitted for ADHF, also inappropriate ICD shock       Problem/Plan - 1:  ·  Problem: Acute decompensated systolic heart failure.   ·  Plan: Continue 4mg iv bumex bid, will uptitrate based on response    - TTE 8/26 similar to prior wiith EF 20%  - GDMT Toprol 25mg daily  - Cont Entresto 24/26mg BID as BP tolerates  - Add Aldactone 25mg  - Farxiga/Jardiance 10mg daily     Problem/Plan - 2:  ·  Problem: CAD (coronary artery disease).   ·  Plan: c/w asa and plavix.    Problem/Plan - 3:  ·  Problem: Chronic kidney disease, unspecified CKD stage.   ·  Plan: Monitor BMP daily, dose meds per renal fx, avoid nephrotoxins.    Problem/Plan - 4:  ·  Problem: ICD shock   ·  Plan: Inappropriate as per EP'  - settings adjusted  - Possible plan for ICD extraction and re-implant  - Current ICD disabled--> pacing pads on patient    Problem/Plan - 5:  ·  Problem: Prophylactic measure.   ·  Plan: VTE ppx: hep sq              Gertrude Villalobos, AG-NP   Shawn Barnes DO [facility]  Cardiovascular Medicine  [address], [address]  Available through call or text on [employer]  Office: [phone]

## 2024-08-27 NOTE — CONSULT NOTE ADULT - SUBJECTIVE AND OBJECTIVE BOX
[facility]  [phone]  NEPHROLOGY      INITIAL CONSULT NOTE  --------------------------------------------------------------------------------  HPI:    63 year old Male with PMHx HFrEF s/p ICD, HTN, T2DM, s/p LLE amputation in [place] ~2 months ago. Pt presented to ED for ICD shock. Patient reports traveling from [place] to [place], and stopping his diuretics temporarily. ICD interrogated by EP in the ED, ICD oversensing and shock was inappropriate. Pt also found w/ new RBBB. Pt received full dose asa and bumex in the ED for fluid overload and Admitted for ADHF. Renal consulted for abnormal creatinine.      PAST HISTORY  --------------------------------------------------------------------------------  PAST MEDICAL & SURGICAL HISTORY:  Stented coronary artery      Diabetes      AICD (automatic cardioverter/defibrillator) present      Hypertension      Heart failure with reduced ejection fraction      History of ischemic cardiomyopathy      History of COPD      H/O gastroesophageal reflux (GERD)      2019 novel coronavirus disease (COVID-19)      COVID-19 vaccine series completed      H/O vasectomy  20 yrs ago (2000)        FAMILY HISTORY:  Family history of COPD (chronic obstructive pulmonary disease) (Sibling)    Family history of cardiac disorder  Paternal      PAST SOCIAL HISTORY:  Former smoker      ALLERGIES & MEDICATIONS  --------------------------------------------------------------------------------  Allergies    ceftriaxone (Rash)  doxepin (Other)  Zosyn (Pruritus)  vancomycin (Rash (Mild to Mod))    Intolerances      Standing Inpatient Medications  ammonium lactate 12% Lotion 1 Application(s) Topical every 12 hours  aspirin enteric coated 81 milliGRAM(s) Oral daily  buMETAnide IVPB 4 milliGRAM(s) IV Intermittent <User Schedule>  clopidogrel Tablet 75 milliGRAM(s) Oral daily  dextrose 5%. 1000 milliLiter(s) IV Continuous <Continuous>  dextrose 5%. 1000 milliLiter(s) IV Continuous <Continuous>  dextrose 50% Injectable 25 Gram(s) IV Push once  dextrose 50% Injectable 25 Gram(s) IV Push once  dextrose 50% Injectable 12.5 Gram(s) IV Push once  glucagon  Injectable 1 milliGRAM(s) IntraMuscular once  insulin glargine Injectable (LANTUS) 30 Unit(s) SubCutaneous at bedtime  insulin lispro (ADMELOG) corrective regimen sliding scale   SubCutaneous three times a day before meals  insulin lispro (ADMELOG) corrective regimen sliding scale   SubCutaneous at bedtime  insulin lispro Injectable (ADMELOG) 10 Unit(s) SubCutaneous three times a day before meals  levoFLOXacin  Tablet 500 milliGRAM(s) Oral every 24 hours  metoprolol succinate ER 25 milliGRAM(s) Oral daily  pantoprazole    Tablet 40 milliGRAM(s) Oral before breakfast  rosuvastatin 20 milliGRAM(s) Oral at bedtime  sacubitril 24 mG/valsartan 26 mG 1 Tablet(s) Oral every 12 hours  sodium chloride 0.65% Nasal 2 Spray(s) Both Nostrils four times a day  tamsulosin 0.4 milliGRAM(s) Oral at bedtime    PRN Inpatient Medications  acetaminophen     Tablet .. 650 milliGRAM(s) Oral every 6 hours PRN  aluminum hydroxide/magnesium hydroxide/simethicone Suspension 30 milliLiter(s) Oral every 4 hours PRN  dextrose Oral Gel 15 Gram(s) Oral once PRN  melatonin 3 milliGRAM(s) Oral at bedtime PRN  ondansetron Injectable 4 milliGRAM(s) IV Push every 8 hours PRN  oxyCODONE    IR 5 milliGRAM(s) Oral daily PRN      REVIEW OF SYSTEMS  --------------------------------------------------------------------------------  Gen: No fevers/chills   Head/Eyes/Ears/Mouth: No headache; Normal hearing  Respiratory: No dyspnea, cough, wheezing,  CV: No chest pain, orthopnea  GI: No abdominal pain, diarrhea, nausea, vomiting,   : No dysuria, decrease urination or hesitancy urinating  hematuria, nocturia  MSK: No joint pain/swelling; no back pain  Neuro: No dizziness/lightheadedness, weakness  also with +edema    VITALS/PHYSICAL EXAM  --------------------------------------------------------------------------------  T(C): 36.9 (08-27-24 @ 12:18), Max: 36.9 (08-27-24 @ 12:18)  HR: 85 (08-27-24 @ 11:33) (85 - 98)  BP: 94/59 (08-27-24 @ 11:33) (93/59 - 113/76)  RR: 18 (08-27-24 @ 11:33) (18 - 18)  SpO2: 97% (08-27-24 @ 11:33) (95% - 97%)  Wt(kg): --        08-26-24 @ 07:01  -  08-27-24 @ 07:00  --------------------------------------------------------  IN: 300 mL / OUT: 3500 mL / NET: -3200 mL    08-27-24 @ 07:01  -  08-27-24 @ 14:07  --------------------------------------------------------  IN: 240 mL / OUT: 1400 mL / NET: -1160 mL      Physical Exam:  	Gen: Non toxic comfortable appearing   	Pulm: decrease bs  no rales or ronchi or wheezing  	CV: +JVD, RRR, S1S2; no rub  	Back: No CVA tenderness; no sacral edema  	Abd: +BS, soft, nontender/+distended, +abd wall edema  	: No suprapubic tenderness  	UE: Warm, no cyanosis  no clubbing,  no edema;  	LE: Warm, no cyanosis  no clubbing, RLE chronic venous stasis, +edema, LLE BKA  	Neuro: alert and oriented. speech coherent   	Skin: Warm, no decrease skin turgor     LABS/STUDIES  --------------------------------------------------------------------------------              9.5    6.36  >-----------<  154      [08-27-24 @ 06:38]              34.8     140  |  101  |  36  ----------------------------<  117      [08-27-24 @ 06:38]  3.8   |  26  |  1.87        Ca     8.5     [08-27-24 @ 06:38]      Mg     2.3     [08-27-24 @ 06:38]            Creatinine Trend:  SCr 1.87 [08-27 @ 06:38]  SCr 1.90 [08-26 @ 07:15]  SCr 1.67 [08-25 @ 06:50]  SCr 1.66 [08-24 @ 20:03]          HbA1c 8.9      [12-16-19 @ 08:15]    HCV 0.09, Nonreact      [12-16-19 @ 08:36]

## 2024-08-27 NOTE — PROGRESS NOTE ADULT - ASSESSMENT
Assessment/Plan:    right venous insufficency with right foot and leg new onset mild weeping ulcerations: non-infected    recommend normal saline cleanse with adaptic touch/aquacell and dsd to right foot and leg wounds daily  recommend continued elevation while in chair  recommend continued vascular surgery f/u for venous insufficency  -will continue to monitor in house for signs of infection and wound care recommendations

## 2024-08-27 NOTE — DIETITIAN INITIAL EVALUATION ADULT - OTHER INFO
Patient reports UBW 264lb, reports weight gain in setting of fluid, aside from this no recent changes in weight.  Dosing weight: 263.8lb (8/24).  Daily standing weights: 255lb (8/27), 276lb (8/26).  IBW adjusted for BKA: 195.7lb, %IBW: 130% based on lowest daily weight 255lb  Weight history per [facility]: 263lb (9/5/23), 263.9lb (9/30/22).  Weight appears overall stable, fluctuations likely in setting of fluid shifts. RD to continue to monitor weight trends as available/able.     -Acute decompensated systolic heart failure per Cardiology and Electrophysiology. Ordered for bumex. s/p KCl 8/26.  -Current insulin regimen in-house: Correctional sliding scale insulin, admelog 10 units pre-meal, lantus 30 units at bedtime. Finger Sticks WNL for DM, previously had hypoglycemic event on 8/26.  -Ordered for PO levofloxacin for cellulitis.  -CKD unspecified stage per chart.  -Left leg BKA per Podiatry.

## 2024-08-27 NOTE — DIETITIAN INITIAL EVALUATION ADULT - OTHER CALCULATIONS
Defer fluid needs to team.  Estimated nutrient needs based on IBW adjusted for .7lb, with consideration for acute heart failure per chart

## 2024-08-27 NOTE — DIETITIAN INITIAL EVALUATION ADULT - NSFNSADHERENCEPTAFT_GEN_A_CORE
Patient with hx DM, A1C 7.5% from 8/25/24 indicates fair glycemic control PTA. Patient reports blood glucose levels are checked 3-4x/day at home. Reports compliance to prescribed insulin/oral medications PTA: Jardiance, humalog 20 units pre-meal, lantus 40 units at bedtime.  Patient with hx heart failure per chart, does not weigh himself regularly at home.

## 2024-08-27 NOTE — DIETITIAN INITIAL EVALUATION ADULT - ADD RECOMMEND
-Continue Consistent Carbohydrate DASH diet. Defer fluid restriction to medical team.  -RD to allow double protein.  -Monitor PO intake, diet, weight, labs, skin, GI symptoms, and BM regularity.  -RD remains available upon request and will follow up per protocol.

## 2024-08-27 NOTE — DIETITIAN INITIAL EVALUATION ADULT - PROBLEM SELECTOR PLAN 1
-received bumex 2mg ivp in the ED. ordered for 4mg iv bumex bid for now.   -strict i/o  -no TTE available in the chart w/in last yr.     CHOOSE ONE: HFpEF or HFrEF  (EF = 25% on Date 03/2023)  Heart failure, CHF order set initiated    Daily weight to be reviewed today (decreased/stable/increased).  Guideline directed medical therapy as below: (Name/Dose/Frequency)  - Diuretic: actively diuresing   - BB: toprol 25  - entresto 24/26  - MRA: not on it   - SGLT2: jardiance 10

## 2024-08-27 NOTE — PROGRESS NOTE ADULT - ASSESSMENT
63 y.o. male with hx of ischemic cardiomyopathy with HFrEF 30%, cardiac arrest with prior transvenous ICD extraction for infection and subsequent [facility]ICD [facility] 2020, CKD 3, HTN, T2 DM, COPD, LUIS ALFREDO, and LLE amputee who presented after ICD shock.  Patient admits he's had complaints of respiratory congestion, LE swelling and dyspnea for the past few months, he was on varying doses of bumex at home, recently increased to 2mg BID about 2weeks ago. He was doing well otherwise without chest pain, palpitations, dizziness, lightheadedness. He was transferring from his wheelchair to his bed when he suddenly felt an ICD shock prompting him to present to the ED.  S-ICD Interrogation showed an inappropriate ICD shock for device oversensing (see procedure note for full interrogation). Patient denies prior ICD shocks in either devices that he has had.        1. Inappropriate S-ICD shock for device oversensing  2. Acute decompensated heart failure   3. Lower extremity Cellulitis     - ICD reinterrogated by [employer] Rep yesterday and screening failed in all vectors.  ICD has been deactivated to prevent further inappropriate ICD shock.  - R2 Pads on patient as discussed with bedside RN  - Currently being treated for HF exacerbation with IV Bumex and cellulitis with oral antibiotics   - EP will follow closely and determine further ICD plans, possible S-ICD removal with reimplant once medically stable   - Above discussed with primary team     YAIMA Ford [facility]  [phone]  63 y.o. male with hx of ischemic cardiomyopathy with HFrEF 30%, cardiac arrest with prior transvenous ICD extraction for infection and subsequent [employer] S-ICD implant 2020, CKD 3, HTN, T2 DM, COPD, LUIS ALFREDO, and LLE amputee who presented after ICD shock.  Patient admits he's had complaints of respiratory congestion, LE swelling and dyspnea for the past few months, he was on varying doses of bumex at home, recently increased to 2mg BID about 2weeks ago. He was doing well otherwise without chest pain, palpitations, dizziness, lightheadedness. He was transferring from his wheelchair to his bed when he suddenly felt an ICD shock prompting him to present to the ED.  S-ICD Interrogation showed an inappropriate ICD shock for device oversensing (see procedure note for full interrogation). Patient denies prior ICD shocks in either devices that he has had.        1. Inappropriate S-ICD shock for device oversensing  2. Acute decompensated heart failure   3. Lower extremity Cellulitis     - ICD reinterrogated by [employer] Rep yesterday and screening failed in all vectors.  ICD has been deactivated to prevent further inappropriate ICD shock.  This inappropriate device oversensing has occured likely in setting of new RBBB on EKG which was not present in 2020 EKG.   - R2 Pads on patient as discussed with bedside RN  - Currently being treated for HF exacerbation with IV Bumex and cellulitis with oral antibiotics   - EP will follow closely and determine further ICD plans, possible S-ICD removal with reimplant once medically stable   - Above discussed with primary team     YAIMA Ford [facility]  [phone]     Addendum- All options provided to patient including leaving this ICD off, removing this ICD and leaving ICD out, vs. ICD extraction and reimplant EV-ICD.  Patient has decided for removing this S-ICD and reimplant of EV-ICD.  Would prefer to wait for patient to be medically optimized.  Please consult HF for optimization.  EP will follow along.    YAIMA Ford [facility]  [phone]  63 y.o. male with hx of ischemic cardiomyopathy with HFrEF 30%, cardiac arrest with prior transvenous ICD extraction for infection and subsequent [employer] S-ICD implant 2020, CKD 3, HTN, T2 DM, COPD, LUIS ALFREDO, and LLE amputee who presented after ICD shock.  Patient admits he's had complaints of respiratory congestion, LE swelling and dyspnea for the past few months, he was on varying doses of bumex at home, recently increased to 2mg BID about 2weeks ago. He was doing well otherwise without chest pain, palpitations, dizziness, lightheadedness. He was transferring from his wheelchair to his bed when he suddenly felt an ICD shock prompting him to present to the ED.  S-ICD Interrogation showed an inappropriate ICD shock for device oversensing (see procedure note for full interrogation). Patient denies prior ICD shocks in either devices that he has had.        1. Inappropriate S-ICD shock for device oversensing  2. Acute decompensated heart failure   3. Lower extremity Cellulitis     - ICD reinterrogated by [employer] Rep yesterday and screening failed in all vectors.  ICD has been deactivated to prevent further inappropriate ICD shock.  This inappropriate device oversensing has occured likely in setting of new RBBB on EKG which was not present in 2020 EKG.   - R2 Pads on patient as discussed with bedside RN  - Currently being treated for HF exacerbation with IV Bumex and cellulitis with oral antibiotics   - EP will follow closely and determine further ICD plans, possible S-ICD removal with reimplant once medically stable   - Above discussed with primary team     YAIMA Ford [facility]  [phone]     Addendum- All options provided to patient including leaving this ICD off, removing this ICD and leaving ICD out, vs. ICD extraction and reimplant EV-ICD.  Patient has decided for removing this S-ICD and reimplant of EV-ICD.  Would prefer to wait for patient to be medically optimized.  Please consult HF for optimization.  Please avoid Farxiga/ Jardiance medications in preparation for surgical procedure.  EP will follow along.    YAIMA Ford [facility]  [phone]  63 y.o. male with hx of ischemic cardiomyopathy with HFrEF 30%, cardiac arrest with prior transvenous ICD extraction for infection and subsequent [employer] S-ICD implant 2020, CKD 3, HTN, T2 DM, COPD, LUIS ALFREDO, and LLE amputee who presented after ICD shock.  Patient admits he's had complaints of respiratory congestion, LE swelling and dyspnea for the past few months, he was on varying doses of bumex at home, recently increased to 2mg BID about 2weeks ago. He was doing well otherwise without chest pain, palpitations, dizziness, lightheadedness. He was transferring from his wheelchair to his bed when he suddenly felt an ICD shock prompting him to present to the ED.  S-ICD Interrogation showed an inappropriate ICD shock for device oversensing (see procedure note for full interrogation). Patient denies prior ICD shocks in either devices that he has had.        1. Inappropriate S-ICD shock for device oversensing  2. Acute decompensated heart failure   3. Lower extremity new blister, weeping ulcer.  Concern for Cellulitis     - ICD reinterrogated by [employer] Rep yesterday and screening failed in all vectors.  ICD has been deactivated to prevent further inappropriate ICD shock.  This inappropriate device oversensing has occured likely in setting of new RBBB on EKG which was not present in 2020 EKG.   - R2 Pads on patient as discussed with bedside RN  - Currently being treated for HF exacerbation with IV Bumex and cellulitis with oral antibiotics   - EP will follow closely and determine further ICD plans, possible S-ICD removal with reimplant once medically stable   - Above discussed with primary team     YAIMA Ford [facility]  [phone]     Addendum- All options provided to patient including leaving this ICD off, removing this ICD and leaving ICD out, vs. ICD extraction and reimplant EV-ICD.  Patient has decided for removing this S-ICD and reimplant of EV-ICD.  Would prefer to wait for patient to be medically optimized.  Please consult HF for optimization.  Please avoid Farxiga/ Jardiance medications in preparation for surgical procedure.  EP will follow along.    YAIMA Ford [facility]  [phone]

## 2024-08-27 NOTE — DIETITIAN INITIAL EVALUATION ADULT - NS FNS DIET ORDER
Diet, DASH/TLC:   Sodium & Cholesterol Restricted  Consistent Carbohydrate {No Snacks} (CSTCHO)  1500mL Fluid Restriction ([id number])     Special Instructions for Nursin milliLiter(s) to 2000 milliLiter(s) fluid restriction (24 @ 00:19) [Active]

## 2024-08-27 NOTE — PROGRESS NOTE ADULT - SUBJECTIVE AND OBJECTIVE BOX
Podiatry pager #: [phone]/ [phone]    Patient is a 63y old  Male who presents with a chief complaint of Acute on chronic systolic congestive heart failure. Podiatry re evaluated today for new blister formation on the right lower extremity     (27 Aug 2024 13:55)       INTERVAL HPI/OVERNIGHT EVENTS:  Patient seen and evaluated at bedside.  Pt is resting comfortable in NAD. Denies N/V/F/C.  Pain rated at X/10    Allergies    ceftriaxone (Rash)  doxepin (Other)  Zosyn (Pruritus)  vancomycin (Rash (Mild to Mod))    Intolerances        Vital Signs Last 24 Hrs  T(C): 36.9 (27 Aug 2024 12:18), Max: 36.9 (27 Aug 2024 12:18)  T(F): 98.4 (27 Aug 2024 12:18), Max: 98.4 (27 Aug 2024 12:18)  HR: 85 (27 Aug 2024 11:33) (85 - 98)  BP: 94/59 (27 Aug 2024 11:33) (93/59 - 113/76)  BP(mean): --  RR: 18 (27 Aug 2024 11:33) (18 - 18)  SpO2: 97% (27 Aug 2024 11:33) (95% - 97%)    Parameters below as of 27 Aug 2024 11:33  Patient On (Oxygen Delivery Method): room air        acetaminophen     Tablet .. 650 milliGRAM(s) Oral every 6 hours PRN  aluminum hydroxide/magnesium hydroxide/simethicone Suspension 30 milliLiter(s) Oral every 4 hours PRN  ammonium lactate 12% Lotion 1 Application(s) Topical every 12 hours  aspirin enteric coated 81 milliGRAM(s) Oral daily  buMETAnide IVPB 4 milliGRAM(s) IV Intermittent <User Schedule>  clopidogrel Tablet 75 milliGRAM(s) Oral daily  dextrose 5%. 1000 milliLiter(s) IV Continuous <Continuous>  dextrose 5%. 1000 milliLiter(s) IV Continuous <Continuous>  dextrose 50% Injectable 25 Gram(s) IV Push once  dextrose 50% Injectable 12.5 Gram(s) IV Push once  dextrose 50% Injectable 25 Gram(s) IV Push once  dextrose Oral Gel 15 Gram(s) Oral once PRN  glucagon  Injectable 1 milliGRAM(s) IntraMuscular once  insulin glargine Injectable (LANTUS) 30 Unit(s) SubCutaneous at bedtime  insulin lispro (ADMELOG) corrective regimen sliding scale   SubCutaneous three times a day before meals  insulin lispro (ADMELOG) corrective regimen sliding scale   SubCutaneous at bedtime  insulin lispro Injectable (ADMELOG) 10 Unit(s) SubCutaneous three times a day before meals  levoFLOXacin  Tablet 500 milliGRAM(s) Oral every 24 hours  melatonin 3 milliGRAM(s) Oral at bedtime PRN  metoprolol succinate ER 25 milliGRAM(s) Oral daily  ondansetron Injectable 4 milliGRAM(s) IV Push every 8 hours PRN  oxyCODONE    IR 5 milliGRAM(s) Oral daily PRN  pantoprazole    Tablet 40 milliGRAM(s) Oral before breakfast  rosuvastatin 20 milliGRAM(s) Oral at bedtime  sacubitril 24 mG/valsartan 26 mG 1 Tablet(s) Oral every 12 hours  sodium chloride 0.65% Nasal 2 Spray(s) Both Nostrils four times a day  tamsulosin 0.4 milliGRAM(s) Oral at bedtime      LABS:                        9.5    6.36  )-----------( 154      ( 27 Aug 2024 06:38 )             34.8     08-27    140  |  101  |  36<H>  ----------------------------<  117<H>  3.8   |  26  |  1.87<H>    Ca    8.5      27 Aug 2024 06:38  Mg     2.3     08-27        Urinalysis Basic - ( 27 Aug 2024 06:38 )    Color: x / Appearance: x / SG: x / pH: x  Gluc: 117 mg/dL / Ketone: x  / Bili: x / Urobili: x   Blood: x / Protein: x / Nitrite: x   Leuk Esterase: x / RBC: x / WBC x   Sq Epi: x / Non Sq Epi: x / Bacteria: x      CAPILLARY BLOOD GLUCOSE      POCT Blood Glucose.: 104 mg/dL (27 Aug 2024 12:23)  POCT Blood Glucose.: 129 mg/dL (27 Aug 2024 07:19)  POCT Blood Glucose.: 128 mg/dL (26 Aug 2024 21:12)  POCT Blood Glucose.: 119 mg/dL (26 Aug 2024 17:56)  POCT Blood Glucose.: 62 mg/dL (26 Aug 2024 17:19)  POCT Blood Glucose.: 67 mg/dL (26 Aug 2024 17:18)      Lower Extremity Physical Exam:  BELOW KNEE AMPUTATION LEFT LEG  Vascular: DP/PT 0/4, r/F, CFT <3 seconds R/F, Temperature gradient warm right foot.  Venous insufficeincy right leg  Dried eschar noted dorsal right foot. Ulcerative weeping lesions proximal right leg, venous insufficiency right leg, now dorsal right midfoot  Neuro: Epicritic sensation Absent to the level of right foot.  Musculoskeletal/Ortho:  Skin:PIGMENTED SCALY DERMATITIS RIGHT LEG WITH XEROTIC SKIN RIGHT FOOT  Thick, mycotic dystrophic discolored nails 1,2,3,4,5 right foot.    RADIOLOGY & ADDITIONAL TESTS:

## 2024-08-27 NOTE — CONSULT NOTE ADULT - NS ATTEND AMEND GEN_ALL_CORE FT
Seen, examined with, formulated plan with and  agree with above as scribed by NP Quin [Stevan]       lungs decrease bs no rales   heart RRR  ext L bka    + extensive edema including ascites    CKD IV   CHF    bumex 4 mg iv bid   keep O>I   cellulitis levaquin as above

## 2024-08-27 NOTE — DIETITIAN INITIAL EVALUATION ADULT - PERTINENT MEDS FT
MEDICATIONS  (STANDING):  ammonium lactate 12% Lotion 1 Application(s) Topical every 12 hours  aspirin enteric coated 81 milliGRAM(s) Oral daily  buMETAnide IVPB 4 milliGRAM(s) IV Intermittent two times a day  clopidogrel Tablet 75 milliGRAM(s) Oral daily  dextrose 5%. 1000 milliLiter(s) (50 mL/Hr) IV Continuous <Continuous>  dextrose 5%. 1000 milliLiter(s) (100 mL/Hr) IV Continuous <Continuous>  dextrose 50% Injectable 25 Gram(s) IV Push once  dextrose 50% Injectable 12.5 Gram(s) IV Push once  dextrose 50% Injectable 25 Gram(s) IV Push once  glucagon  Injectable 1 milliGRAM(s) IntraMuscular once  insulin glargine Injectable (LANTUS) 30 Unit(s) SubCutaneous at bedtime  insulin lispro (ADMELOG) corrective regimen sliding scale   SubCutaneous three times a day before meals  insulin lispro (ADMELOG) corrective regimen sliding scale   SubCutaneous at bedtime  insulin lispro Injectable (ADMELOG) 10 Unit(s) SubCutaneous three times a day before meals  levoFLOXacin  Tablet 500 milliGRAM(s) Oral every 24 hours  metoprolol succinate ER 25 milliGRAM(s) Oral daily  pantoprazole    Tablet 40 milliGRAM(s) Oral before breakfast  rosuvastatin 20 milliGRAM(s) Oral at bedtime  sacubitril 24 mG/valsartan 26 mG 1 Tablet(s) Oral every 12 hours  sodium chloride 0.65% Nasal 2 Spray(s) Both Nostrils four times a day  tamsulosin 0.4 milliGRAM(s) Oral at bedtime    MEDICATIONS  (PRN):  acetaminophen     Tablet .. 650 milliGRAM(s) Oral every 6 hours PRN Temp greater or equal to 38C (100.4F), Mild Pain (1 - 3)  aluminum hydroxide/magnesium hydroxide/simethicone Suspension 30 milliLiter(s) Oral every 4 hours PRN Dyspepsia  dextrose Oral Gel 15 Gram(s) Oral once PRN Blood Glucose LESS THAN 70 milliGRAM(s)/deciliter  melatonin 3 milliGRAM(s) Oral at bedtime PRN Insomnia  ondansetron Injectable 4 milliGRAM(s) IV Push every 8 hours PRN Nausea and/or Vomiting  oxyCODONE    IR 5 milliGRAM(s) Oral daily PRN Severe Pain (7 - 10)

## 2024-08-27 NOTE — PROGRESS NOTE ADULT - SUBJECTIVE AND OBJECTIVE BOX
24H hour events: ICD is programmed off at this time.     MEDICATIONS:  aspirin enteric coated 81 milliGRAM(s) Oral daily  buMETAnide IVPB 4 milliGRAM(s) IV Intermittent two times a day  clopidogrel Tablet 75 milliGRAM(s) Oral daily  metoprolol succinate ER 25 milliGRAM(s) Oral daily  sacubitril 24 mG/valsartan 26 mG 1 Tablet(s) Oral every 12 hours    levoFLOXacin  Tablet 500 milliGRAM(s) Oral every 24 hours    acetaminophen     Tablet .. 650 milliGRAM(s) Oral every 6 hours PRN  melatonin 3 milliGRAM(s) Oral at bedtime PRN  ondansetron Injectable 4 milliGRAM(s) IV Push every 8 hours PRN  oxyCODONE    IR 5 milliGRAM(s) Oral daily PRN    aluminum hydroxide/magnesium hydroxide/simethicone Suspension 30 milliLiter(s) Oral every 4 hours PRN  pantoprazole    Tablet 40 milliGRAM(s) Oral before breakfast    dextrose 50% Injectable 25 Gram(s) IV Push once  dextrose 50% Injectable 25 Gram(s) IV Push once  dextrose 50% Injectable 12.5 Gram(s) IV Push once  dextrose Oral Gel 15 Gram(s) Oral once PRN  glucagon  Injectable 1 milliGRAM(s) IntraMuscular once  insulin glargine Injectable (LANTUS) 30 Unit(s) SubCutaneous at bedtime  insulin lispro (ADMELOG) corrective regimen sliding scale   SubCutaneous three times a day before meals  insulin lispro (ADMELOG) corrective regimen sliding scale   SubCutaneous at bedtime  insulin lispro Injectable (ADMELOG) 10 Unit(s) SubCutaneous three times a day before meals  rosuvastatin 20 milliGRAM(s) Oral at bedtime    ammonium lactate 12% Lotion 1 Application(s) Topical every 12 hours  dextrose 5%. 1000 milliLiter(s) IV Continuous <Continuous>  dextrose 5%. 1000 milliLiter(s) IV Continuous <Continuous>  sodium chloride 0.65% Nasal 2 Spray(s) Both Nostrils four times a day  tamsulosin 0.4 milliGRAM(s) Oral at bedtime      REVIEW OF SYSTEMS:  Complete 12point ROS negative.    PHYSICAL EXAM:  T(C): 36.5 (08-27-24 @ 04:20), Max: 36.7 (08-26-24 @ 20:57)  HR: 85 (08-27-24 @ 06:51) (85 - 98)  BP: 107/75 (08-27-24 @ 06:51) (93/59 - 113/76)  RR: 18 (08-27-24 @ 04:20) (18 - 18)  SpO2: 95% (08-27-24 @ 04:20) (95% - 97%)    26 Aug 2024 07:01  -  27 Aug 2024 07:00  --------------------------------------------------------  IN: 300 mL / OUT: 3500 mL / NET: -3200 mL    27 Aug 2024 07:01  -  27 Aug 2024 11:28  --------------------------------------------------------  IN: 240 mL / OUT: 750 mL / NET: -510 mL    Appearance: Normal	  HEENT:   Normal oral mucosa, PERRL, EOMI	  Cardiovascular: Normal S1 S2, regular. No JVD, No murmurs, No edema  Respiratory: Lungs clear to auscultation	  Psychiatry: A & O x 3, Mood & affect appropriate  Gastrointestinal:  Soft, Non-tender, + BS	  Skin: No rashes, No ecchymoses, No cyanosis	  Extremities: Left LE BKA.  Right LE chronic venous stasis changes, chronic scabbed wound.   Vascular: Peripheral pulses palpable 2+ bilaterally      LABS:	 	    CBC Full  -  ( 27 Aug 2024 06:38 )  WBC Count : 6.36 K/uL  Hemoglobin : 9.5 g/dL  Hematocrit : 34.8 %  Platelet Count - Automated : 154 K/uL  Mean Cell Volume : 71.8 fl  Mean Cell Hemoglobin : 19.6 pg  Mean Cell Hemoglobin Concentration : 27.3 gm/dL  Auto Neutrophil # : x  Auto Lymphocyte # : x  Auto Monocyte # : x  Auto Eosinophil # : x  Auto Basophil # : x  Auto Neutrophil % : x  Auto Lymphocyte % : x  Auto Monocyte % : x  Auto Eosinophil % : x  Auto Basophil % : x    08-27    140  |  101  |  36<H>  ----------------------------<  117<H>  3.8   |  26  |  1.87<H>  08-26    140  |  100  |  31<H>  ----------------------------<  138<H>  3.8   |  25  |  1.90<H>    Ca    8.5      27 Aug 2024 06:38  Ca    8.6      26 Aug 2024 07:15  Mg     2.3     08-27  Mg     2.2     08-26        TELEMETRY: 	  NSR 90's    TTE:  TRANSTHORACIC ECHOCARDIOGRAM REPORT  Pt. Name:       SEVERIANO MARTINEZ Study Date:    8/26/2024  MRN:            [id number]        YOB: 1960  Accession #:    [id number]         Age:           63 years  Account#:       [id number]      Gender:        M  Heart Rate:     85 bpm            Height:        77.00 in (195.58 cm)  Rhythm:         sinus rhythm      Weight:        263.00 lb (119.30 kg)  Blood Pressure: 103/62 mmHg       BSA/BMI:       2.51 m² / 31.19 kg/m²  ________________________________________________________________________________________  Referring Physician:    [id number] Destiny Sauer  Interpreting Physician: Garrett Menjivar MD  Primary Sonographer:    Cara WALDEN    CPT:                ECHO TTE WITH CON COMP W DOPP - .m;DEFINITY ECHO                      CONTRAST PER ML - .m;DEFINITY ECHO CONTRAST PER ML             WASTED - .m  Indication(s):      Heart failure, unspecified - I50.9  Procedure:          Transthoracic echocardiogram with 2-D, M-mode and complete                      spectral and color flow Doppler.  Ordering Location:  [facility]  Admission Status:   Inpatient  Contrast Injection: Verbal consent was obtained for injection of Ultrasonic                      Enhancing Agent following a discussion of risks and                      benefits.                      Endocardial visualization enhanced with 2 ml of Definity                      Ultrasound enhancing agent (Lot#:[id number] Exp.Date:04/2025                      Discarded Dose:8ml).  UEA Reaction:       Patient had no adverse reaction after injection of                      Ultrasound Enhancing Agent.  Study Information:  Image quality for this study is adequate.     CONCLUSIONS:      1. Left ventricular cavity is severely dilated. Left ventricular wall thickness is normal. Left ventricular systolic function is severely decreased with an ejection fraction of 22 % by Bell's method of disks. Regional wall motion abnormalities present.   2. Multiple segmental abnormalities exist. See findings.   3. Normal right ventricular cavity size, with normal wall thickness, and probably normal right ventricular systolic function.   4. Estimated pulmonary artery systolic pressure is 30 mmHg.   5. Trace pericardial effusion.    FINDINGS:  Left Ventricle:  After obtaining consent, Definity ultrasound enhancing agent was given for enhanced left ventricular opacification and improved delineation of the left ventricular endocardial borders. The left ventricular cavity is severely dilated. Left ventricular wall thickness is normal. Left ventricular systolic function is severely decreased with a calculated ejection fraction of 22 % by the Bell's biplane method of disks. There are regional wall motion abnormalities present. Elevated left ventricular filling pressure.  LV Wall Scoring: The entire apex, entire anterior wall, basal and mid anterior  septum, and mid inferolateral segment are akinetic. The basal and mid  anterolateral wall, basal and mid inferior septum, basal and mid inferior wall,  and basal inferolateral segment are hypokinetic.     Right Ventricle:  The right ventricular cavity is normal in size, with normal wall thickness and right ventricular systolic function is probably normal. Tricuspid annular plane systolic excursion (TAPSE) is 2.0 cm (normal >=1.7 cm).     Left Atrium:  The left atrium is normal in size with an indexed volume of 28.34 ml/m².     Right Atrium:  The right atrium is severely dilated with an indexed volume of 42.58 ml/m².     Interatrial Septum:  The interatrial septum appears intact.     Aortic Valve:  Normal aortic valve. There is no aortic valve stenosis.     Mitral Valve:  There is no mitral valve stenosis. There is mild mitral regurgitation.     Tricuspid Valve:  Structurally normal tricuspid valve with normal leaflet excursion. There is massive tricuspid regurgitation. Estimated pulmonary artery systolic pressure is 30 mmHg.     Pulmonic Valve:  Normal pulmonic valve. There is trace pulmonic regurgitation.     Aorta:  The aortic root appears normal in size.     Pericardium:  There is a trace pericardial effusion.     Systemic Veins:  The inferior vena cava is normal in size (normal <2.1cm) with normal inspiratory collapse (normal >50%) consistent with normal right atrial pressure (~3, range 0-5mmHg).  ____________________________________________________________________  QUANTITATIVE DATA:  Left Ventricle Measurements: (Indexed to BSA)     IVSd (2D):   0.9 cm  LVPWd (2D):  1.4 cm  LVIDd (2D):  6.8 cm  LVIDs (2D):  5.7 cm  LV Mass:     357 g  141.9 g/m²  LV Vol d, MOD A2C: 268.0 ml 106.66 ml/m²  LV Vol d, MOD A4C: 246.0 ml 97.90 ml/m²  LV Vol d, MOD BP:  260.0 ml 103.48 ml/m²  LV Vol s, MOD A2C: 209.0 ml 83.18 ml/m²  LV Vol s, MOD A4C: 191.0 ml 76.01 ml/m²  LV Vol s, MOD BP:  202.5 ml 80.58 ml/m²  LVOT SV MOD BP:    57.6 ml  LV EF% MOD BP:     22 %     MV E Vmax:    0.85 m/s  MV A Vmax:    0.56 m/s  MV E/A:       1.53  e' lateral:   5.66 cm/s  e' medial:    5.44 cm/s  E/e' lateral: 15.07  E/e' medial:  15.68  E/e' Average: 15.37    Aorta Measurements: (Normal range) (Indexed to BSA)     Ao Root d     3.30 cm (3.1 - 3.7 cm) 1.31 cm/m²  Ao Annulus:   2.1 cm (2.3 - 2.9 cm)  Ao Asc d, 2D: 3.60  Ao Asc prox:  3.50 cm                1.39 cm/m²    Left Atrium Measurements: (Indexed to BSA)  LA Diam 2D:        4.80 cm  LA Vol s, MOD A4C: 76.70 ml.  LA Vol s, MOD A2C: 66.20 ml.  LA Vol s, MOD BP:  71.20 ml  28.34 ml/m²    Right Ventricle Measurements: Right Atrial Measurements:     TAPSE:      2.0 cm            RA Vol:       107.00 ml  RV S' Vmax: 9.57 cm/s         RA Vol Index: 42.58 ml/m²     LVOT / RVOT/ Qp/Qs Data: (Indexed to BSA)  LVOT Diameter:  2.10 cm  LVOT Area:      3.46 cm²  LVOT Vmax:      0.55 m/s  LVOT Vmn:       0.351 m/s  LVOT VTI:       10.50 cm  LVOT peak grad: 1 mmHg  LVOT mean grad: 1.0 mmHg  LVOT SV:        36.4 ml   14.47 ml/m²    Mitral Valve Measurements:     MV E Vmax: 0.9 m/s  MV A Vmax: 0.6 m/s  MV E/A:    1.5    Tricuspid Valve Measurements:     TR Vmean:          2.0 m/s  TR Vmax:           2.6 m/s  TR Peak Gradient:  26.6 mmHg  RA Pressure:       3 mmHg  PASP:              30 mmHg  TR PISA Radius:    0.80 cm  TR Aliasing Larry:   0.38 m/s  TR Inst Flow Rate: 154.8 ml/s  TR VTI:            85.80 cm  TR Eff KATHY:        0.60 cm²  TR Reg Volume:     51.49 ml  ________________________________________________________________________________________  Electronically signed on 8/26/2024 at 3:37:27 PM by Garrett Menjivar MD     *** Final ***

## 2024-08-27 NOTE — DIETITIAN INITIAL EVALUATION ADULT - LITERATURE/VIDEOS GIVEN
Heart Failure Nutrition Therapy, Heart Healthy Label Reading Tips, Heart Healthy Shopping Tips, Sodium (salt) Content of Foods

## 2024-08-27 NOTE — DIETITIAN INITIAL EVALUATION ADULT - PERTINENT LABORATORY DATA
08-27    140  |  101  |  36<H>  ----------------------------<  117<H>  3.8   |  26  |  1.87<H>    Ca    8.5      27 Aug 2024 06:38  Mg     2.3     08-27    POCT Blood Glucose.: 104 mg/dL (27 Aug 2024 12:23)  POCT Blood Glucose.: 129 mg/dL (27 Aug 2024 07:19)  POCT Blood Glucose.: 128 mg/dL (26 Aug 2024 21:12)  POCT Blood Glucose.: 119 mg/dL (26 Aug 2024 17:56)  POCT Blood Glucose.: 62 mg/dL (26 Aug 2024 17:19)  POCT Blood Glucose.: 67 mg/dL (26 Aug 2024 17:18)    A1C with Estimated Average Glucose Result: 7.5 % (08-25-24 @ 06:50)

## 2024-08-28 DIAGNOSIS — T82.198A OTHER MECHANICAL COMPLICATION OF OTHER CARDIAC ELECTRONIC DEVICE, INITIAL ENCOUNTER: ICD-10-CM

## 2024-08-28 DIAGNOSIS — I50.23 ACUTE ON CHRONIC SYSTOLIC (CONGESTIVE) HEART FAILURE: ICD-10-CM

## 2024-08-28 LAB
ANION GAP SERPL CALC-SCNC: 13 MMOL/L — SIGNIFICANT CHANGE UP (ref 5–17)
BUN SERPL-MCNC: 38 MG/DL — HIGH (ref 7–23)
CALCIUM SERPL-MCNC: 8.9 MG/DL — SIGNIFICANT CHANGE UP (ref 8.4–10.5)
CHLORIDE SERPL-SCNC: 100 MMOL/L — SIGNIFICANT CHANGE UP (ref 96–108)
CO2 SERPL-SCNC: 28 MMOL/L — SIGNIFICANT CHANGE UP (ref 22–31)
CREAT SERPL-MCNC: 2.01 MG/DL — HIGH (ref 0.5–1.3)
EGFR: 37 ML/MIN/1.73M2 — LOW
GLUCOSE BLDC GLUCOMTR-MCNC: 103 MG/DL — HIGH (ref 70–99)
GLUCOSE BLDC GLUCOMTR-MCNC: 111 MG/DL — HIGH (ref 70–99)
GLUCOSE BLDC GLUCOMTR-MCNC: 136 MG/DL — HIGH (ref 70–99)
GLUCOSE BLDC GLUCOMTR-MCNC: 139 MG/DL — HIGH (ref 70–99)
GLUCOSE SERPL-MCNC: 116 MG/DL — HIGH (ref 70–99)
HCT VFR BLD CALC: 35.4 % — LOW (ref 39–50)
HGB BLD-MCNC: 9.8 G/DL — LOW (ref 13–17)
MAGNESIUM SERPL-MCNC: 2.3 MG/DL — SIGNIFICANT CHANGE UP (ref 1.6–2.6)
MCHC RBC-ENTMCNC: 20.2 PG — LOW (ref 27–34)
MCHC RBC-ENTMCNC: 27.7 GM/DL — LOW (ref 32–36)
MCV RBC AUTO: 72.8 FL — LOW (ref 80–100)
NRBC # BLD: 0 /100 WBCS — SIGNIFICANT CHANGE UP (ref 0–0)
PLATELET # BLD AUTO: 163 K/UL — SIGNIFICANT CHANGE UP (ref 150–400)
POTASSIUM SERPL-MCNC: 3.8 MMOL/L — SIGNIFICANT CHANGE UP (ref 3.5–5.3)
POTASSIUM SERPL-SCNC: 3.8 MMOL/L — SIGNIFICANT CHANGE UP (ref 3.5–5.3)
RBC # BLD: 4.86 M/UL — SIGNIFICANT CHANGE UP (ref 4.2–5.8)
RBC # FLD: 20.9 % — HIGH (ref 10.3–14.5)
SODIUM SERPL-SCNC: 141 MMOL/L — SIGNIFICANT CHANGE UP (ref 135–145)
WBC # BLD: 7.96 K/UL — SIGNIFICANT CHANGE UP (ref 3.8–10.5)
WBC # FLD AUTO: 7.96 K/UL — SIGNIFICANT CHANGE UP (ref 3.8–10.5)

## 2024-08-28 PROCEDURE — 99233 SBSQ HOSP IP/OBS HIGH 50: CPT

## 2024-08-28 PROCEDURE — 99223 1ST HOSP IP/OBS HIGH 75: CPT | Mod: GC

## 2024-08-28 RX ORDER — SPIRONOLACTONE 25 MG/1
25 TABLET, FILM COATED ORAL DAILY
Refills: 0 | Status: DISCONTINUED | OUTPATIENT
Start: 2024-08-28 | End: 2024-09-03

## 2024-08-28 RX ORDER — HYDRALAZINE HCL 50 MG
10 TABLET ORAL THREE TIMES A DAY
Refills: 0 | Status: DISCONTINUED | OUTPATIENT
Start: 2024-08-28 | End: 2024-08-30

## 2024-08-28 RX ORDER — POTASSIUM CHLORIDE 10 MEQ
20 TABLET, EXT RELEASE, PARTICLES/CRYSTALS ORAL ONCE
Refills: 0 | Status: COMPLETED | OUTPATIENT
Start: 2024-08-28 | End: 2024-08-28

## 2024-08-28 RX ORDER — ALPRAZOLAM 0.25 MG
0.25 TABLET ORAL ONCE
Refills: 0 | Status: DISCONTINUED | OUTPATIENT
Start: 2024-08-28 | End: 2024-09-02

## 2024-08-28 RX ADMIN — Medication 40 MILLIGRAM(S): at 05:46

## 2024-08-28 RX ADMIN — TAMSULOSIN HYDROCHLORIDE 0.4 MILLIGRAM(S): 0.4 CAPSULE ORAL at 21:44

## 2024-08-28 RX ADMIN — OXYCODONE HYDROCHLORIDE 5 MILLIGRAM(S): 5 TABLET ORAL at 21:44

## 2024-08-28 RX ADMIN — BUMETANIDE 132 MILLIGRAM(S): 2 TABLET ORAL at 05:47

## 2024-08-28 RX ADMIN — Medication 10 UNIT(S): at 08:35

## 2024-08-28 RX ADMIN — ROSUVASTATIN CALCIUM 20 MILLIGRAM(S): 10 TABLET ORAL at 21:44

## 2024-08-28 RX ADMIN — Medication 10 UNIT(S): at 12:05

## 2024-08-28 RX ADMIN — Medication 1 APPLICATION(S): at 18:43

## 2024-08-28 RX ADMIN — Medication 75 MILLIGRAM(S): at 11:21

## 2024-08-28 RX ADMIN — SACUBITRIL AND VALSARTAN 1 TABLET(S): 49; 51 TABLET, FILM COATED ORAL at 05:47

## 2024-08-28 RX ADMIN — Medication 81 MILLIGRAM(S): at 11:21

## 2024-08-28 RX ADMIN — Medication 20 MILLIEQUIVALENT(S): at 16:43

## 2024-08-28 RX ADMIN — INSULIN GLARGINE 30 UNIT(S): 100 INJECTION, SOLUTION SUBCUTANEOUS at 21:45

## 2024-08-28 RX ADMIN — METOPROLOL TARTRATE 25 MILLIGRAM(S): 100 TABLET ORAL at 05:46

## 2024-08-28 RX ADMIN — Medication 1 APPLICATION(S): at 05:48

## 2024-08-28 RX ADMIN — SACUBITRIL AND VALSARTAN 1 TABLET(S): 49; 51 TABLET, FILM COATED ORAL at 17:53

## 2024-08-28 RX ADMIN — Medication 10 UNIT(S): at 17:48

## 2024-08-28 RX ADMIN — BUMETANIDE 132 MILLIGRAM(S): 2 TABLET ORAL at 16:47

## 2024-08-28 NOTE — PROGRESS NOTE ADULT - SUBJECTIVE AND OBJECTIVE BOX
[facility]   [phone]  RENAL FOLLOW UP NOTE  --------------------------------------------------------------------------------  Chief Complaint:    24 hour events/subjective:    seen earlier   states breathing is better     PAST HISTORY  --------------------------------------------------------------------------------  No significant changes to PMH, PSH, FHx, SHx, unless otherwise noted    ALLERGIES & MEDICATIONS  --------------------------------------------------------------------------------  Allergies    ceftriaxone (Rash)  doxepin (Other)  Zosyn (Pruritus)  vancomycin (Rash (Mild to Mod))    Intolerances      Standing Inpatient Medications  ALPRAZolam 0.25 milliGRAM(s) Oral once  ammonium lactate 12% Lotion 1 Application(s) Topical every 12 hours  aspirin enteric coated 81 milliGRAM(s) Oral daily  buMETAnide IVPB 4 milliGRAM(s) IV Intermittent <User Schedule>  clopidogrel Tablet 75 milliGRAM(s) Oral daily  dextrose 5%. 1000 milliLiter(s) IV Continuous <Continuous>  dextrose 5%. 1000 milliLiter(s) IV Continuous <Continuous>  dextrose 50% Injectable 25 Gram(s) IV Push once  dextrose 50% Injectable 12.5 Gram(s) IV Push once  dextrose 50% Injectable 25 Gram(s) IV Push once  glucagon  Injectable 1 milliGRAM(s) IntraMuscular once  hydrALAZINE 10 milliGRAM(s) Oral three times a day  insulin glargine Injectable (LANTUS) 30 Unit(s) SubCutaneous at bedtime  insulin lispro (ADMELOG) corrective regimen sliding scale   SubCutaneous three times a day before meals  insulin lispro (ADMELOG) corrective regimen sliding scale   SubCutaneous at bedtime  insulin lispro Injectable (ADMELOG) 10 Unit(s) SubCutaneous three times a day before meals  levoFLOXacin  Tablet 500 milliGRAM(s) Oral every 24 hours  metoprolol succinate ER 25 milliGRAM(s) Oral daily  pantoprazole    Tablet 40 milliGRAM(s) Oral before breakfast  rosuvastatin 20 milliGRAM(s) Oral at bedtime  sacubitril 24 mG/valsartan 26 mG 1 Tablet(s) Oral every 12 hours  sodium chloride 0.65% Nasal 2 Spray(s) Both Nostrils four times a day  spironolactone 25 milliGRAM(s) Oral daily  tamsulosin 0.4 milliGRAM(s) Oral at bedtime    PRN Inpatient Medications  acetaminophen     Tablet .. 650 milliGRAM(s) Oral every 6 hours PRN  aluminum hydroxide/magnesium hydroxide/simethicone Suspension 30 milliLiter(s) Oral every 4 hours PRN  dextrose Oral Gel 15 Gram(s) Oral once PRN  melatonin 3 milliGRAM(s) Oral at bedtime PRN  ondansetron Injectable 4 milliGRAM(s) IV Push every 8 hours PRN  oxyCODONE    IR 5 milliGRAM(s) Oral daily PRN      REVIEW OF SYSTEMS  --------------------------------------------------------------------------------    Gen: denies  fevers/chills,  CVS: denies chest pain/palpitations  Resp: denies SOB/Cough  GI: Denies N/V/Abd pain  : Denies dysuria or decrease urination     VITALS/PHYSICAL EXAM  --------------------------------------------------------------------------------  T(C): 37.1 (08-28-24 @ 20:35), Max: 37.1 (08-28-24 @ 20:35)  HR: 98 (08-28-24 @ 20:35) (89 - 110)  BP: 99/60 (08-28-24 @ 20:35) (95/59 - 116/66)  RR: 18 (08-28-24 @ 20:35) (18 - 18)  SpO2: 91% (08-28-24 @ 20:35) (91% - 95%)  Wt(kg): --        08-27-24 @ 07:01  -  08-28-24 @ 07:00  --------------------------------------------------------  IN: 540 mL / OUT: 4600 mL / NET: -4060 mL    08-28-24 @ 07:01  -  08-28-24 @ 22:47  --------------------------------------------------------  IN: 600 mL / OUT: 3950 mL / NET: -3350 mL      Physical Exam:  	    	Gen: Non toxic comfortable appearing   	Pulm: decrease bs  no rales or ronchi or wheezing  	CV: +JVD, RRR, S1S2; no rub  	Back: No CVA tenderness; no sacral edema  	Abd: +BS, soft, nontender/+distended, +abd wall edema  	: No suprapubic tenderness  	UE: Warm, no cyanosis  no clubbing,  no edema;  	LE: Warm, no cyanosis  no clubbing, RLE chronic venous stasis, +edema, LLE BKA  	Neuro: alert and oriented. speech coherent       LABS/STUDIES  --------------------------------------------------------------------------------              9.8    7.96  >-----------<  163      [08-28-24 @ 06:33]              35.4     141  |  100  |  38  ----------------------------<  116      [08-28-24 @ 06:33]  3.8   |  28  |  2.01        Ca     8.9     [08-28-24 @ 06:33]      Mg     2.3     [08-28-24 @ 06:33]            Creatinine Trend:  SCr 2.01 [08-28 @ 06:33]  SCr 1.87 [08-27 @ 06:38]  SCr 1.90 [08-26 @ 07:15]  SCr 1.67 [08-25 @ 06:50]  SCr 1.66 [08-24 @ 20:03]        HbA1c 8.9      [12-16-19 @ 08:15]

## 2024-08-28 NOTE — CONSULT NOTE ADULT - PROBLEM SELECTOR RECOMMENDATION 9
- Avoid manipulating nasal packing. it will dissolve on its own  - Strict blood pressure control.  - Nasal saline, 2 sprays to both nares 4 times a day  - Afrin 2 sprays in b/l nares q12 hrs prn for 3 days maximum for active epistaxis  - Avoid nasal trauma; no nose rubbing, or nose blowing. Sneeze with mouth open and pinching nares.  - Avoid bending with head below the waist.    - No heavy lifting  - ENT will continue to follow, please call with questions or concerns x [phone].
- continue asa, plavix, statin

## 2024-08-28 NOTE — CONSULT NOTE ADULT - PROBLEM SELECTOR RECOMMENDATION 3
Etiology: ischemic  BB: c/w toprol 25mg qd  ACE/ARB/ARNI: c/w entresto 24-26mg BID. Would change hold parameters to SBP <90mmhg  MRA: start spironolactone 25mg qd  SGLT2: hold for now given possible EP intervention this admission, consider prior to dc  Device: S-ICD with inappropriate shocks, pending extraction and EV-ICD reimplant this admission  Diuresis: continue Bumex IV 4mg BID  Afterload: start hydralazine 10mg TID

## 2024-08-28 NOTE — CONSULT NOTE ADULT - PROBLEM SELECTOR RECOMMENDATION 2
- F/u CT Max/Face (non-con in the setting of MANJIT as per primary team)
- continue to monitor renal function

## 2024-08-28 NOTE — PROGRESS NOTE ADULT - SUBJECTIVE AND OBJECTIVE BOX
DATE OF SERVICE: 08-28-24 @ 13:32    Patient is a 63y old  Male who presents with a chief complaint of Acute on chronic systolic congestive heart failure     (27 Aug 2024 13:55)      INTERVAL HISTORY: Feels ok.     REVIEW OF SYSTEMS:  CONSTITUTIONAL: No weakness  EYES/ENT: No visual changes;  No throat pain   NECK: No pain or stiffness  RESPIRATORY: No cough, wheezing; No shortness of breath  CARDIOVASCULAR: No chest pain or palpitations  GASTROINTESTINAL: No abdominal  pain. No nausea, vomiting, or hematemesis  GENITOURINARY: No dysuria, frequency or hematuria  NEUROLOGICAL: No stroke like symptoms  SKIN: No rashes    TELEMETRY Personally reviewed: SR with 1st AVb, 6 beats of WCT  	  MEDICATIONS:  buMETAnide IVPB 4 milliGRAM(s) IV Intermittent <User Schedule>  metoprolol succinate ER 25 milliGRAM(s) Oral daily  sacubitril 24 mG/valsartan 26 mG 1 Tablet(s) Oral every 12 hours        PHYSICAL EXAM:  T(C): 36.6 (08-28-24 @ 11:48), Max: 37 (08-28-24 @ 04:00)  HR: 91 (08-28-24 @ 11:48) (90 - 110)  BP: 98/63 (08-28-24 @ 11:48) (98/63 - 122/66)  RR: 18 (08-28-24 @ 11:48) (18 - 18)  SpO2: 94% (08-28-24 @ 11:48) (93% - 97%)  Wt(kg): --  I&O's Summary    27 Aug 2024 07:01  -  28 Aug 2024 07:00  --------------------------------------------------------  IN: 540 mL / OUT: 4600 mL / NET: -4060 mL    28 Aug 2024 07:01  -  28 Aug 2024 13:32  --------------------------------------------------------  IN: 360 mL / OUT: 1050 mL / NET: -690 mL          Appearance: In no distress	  HEENT:    PERRL, EOMI	  Cardiovascular:  S1 S2, + JVD  Respiratory: Lungs clear to auscultation	  Gastrointestinal:  Soft, Non-tender, + BS	  Vascularature:  + edema of LE, L BKA  Psychiatric: Appropriate affect   Neuro: no acute focal deficits                               9.8    7.96  )-----------( 163      ( 28 Aug 2024 06:33 )             35.4     08-28    141  |  100  |  38<H>  ----------------------------<  116<H>  3.8   |  28  |  2.01<H>    Ca    8.9      28 Aug 2024 06:33  Mg     2.3     08-28          Labs personally reviewed      ASSESSMENT/PLAN: 	      Problem/Plan - 1:  ·  Problem: Acute decompensated systolic heart failure.   ·  Plan: Continue 4mg iv bumex bid, will uptitrate based on response    - TTE 8/26 similar to prior wiith EF 20%  - GDMT Toprol 25mg daily  - Cont Entresto 24/26mg BID as BP tolerates  - Add Aldactone 25mg  - Farxiga/Jardiance 10mg daily  - Appreciate HF recommendations     Problem/Plan - 2:  ·  Problem: CAD (coronary artery disease).   ·  Plan: c/w asa and Plavix    Problem/Plan - 3:  ·  Problem: Chronic kidney disease, unspecified CKD stage.   ·  Plan: Monitor BMP daily, dose meds per renal fx, avoid nephrotoxins.    Problem/Plan - 4:  ·  Problem: ICD shock   ·  Plan: Inappropriate as per EP'  - settings adjusted  - Possible plan for ICD extraction and re-implant  - Current ICD disabled--> pacing pads on patient    Problem/Plan - 5:  ·  Problem: Prophylactic measure.   ·  Plan: VTE ppx: hep sq         Gertrude Villalobos, AG-NP   Shawn Barnes,  [facility]  Cardiovascular Medicine  [address], [address]  Available through call or text on [employer]  Office: [phone]

## 2024-08-28 NOTE — CONSULT NOTE ADULT - ATTENDING COMMENTS
shocked for T wave oversensing in primary vector. Reprogrammed to secondary vector overnight.  CXR shows stable lead position. NO further EPS evalution, ie may be discharged from EPS standpoint.
Mr. Jessica is a 62 y/o M /w h/o HFrEF/ICM (EF 20%, LVEDD 6.4 cm) s/p prior ICD c/b bacteremia s/p extraction (now with subQ ICD), IDDM (A1c 7.1) c/b osteo s/p LLE BKA (2024), CKD (b/l Cr 1.6), CAD s/p STEMI s/p PCI (2018 to RI) and repeat PCI to LCx (2019) who presented on 8/24 with ICD shock d/t oversensing. SubQ deactivated. Also reported worsening orthopnea/PND x several months with worsening recently in setting of de-escalation of diuretics in [place] and reluctance to take diuretics d/t frequent urination. Wife noted worsening abdominal distention. Had previously followed with Dr. Vanegas but had stopped seeing since 2023. Was recommended CardioMEMS but declined. EP requested HF consult. On exam, JVP severely elevated, RRR, dec BS b/l, distended/tense abdomen, b/l edema to sacrum. Labs reviewed - Hb 9, BUN/Cr 38/2 (b/l 1.6), BNP 3k, K 3.8. LHC 12/18/19 - ostial %, distal LCx 80% s/p PCI, mRCA 50%. TTE 8/25 reviewed - EF 20%, LVEDD 6.4 cm, mod TR, LVOT VTI 9.5 cm, E/e' 22, severe TR, dilated IVC. Overall stage C HF, NYHA class III with severe volume overload, responding to diuretics.  - c/w current regimen  - add hydral 10 mg q8h  - start haylie 25 mg daily  - c/w toprol 25 mg daily  - check iron studies; if iron def, would benefit from IV iron  - standing weights daily  - d/w patient disease process

## 2024-08-28 NOTE — PROGRESS NOTE ADULT - ASSESSMENT
63 y.o. male with hx of ischemic cardiomyopathy with HFrEF 30%, cardiac arrest with prior transvenous ICD extraction for infection and subsequent [employer] S-ICD implant 2020, CKD 3, HTN, T2 DM, COPD, LUIS ALFREDO, and LLE amputee who presented after ICD shock.  Patient admits he's had complaints of respiratory congestion, LE swelling and dyspnea for the past few months, he was on varying doses of bumex at home, recently increased to 2mg BID about 2weeks ago. He was doing well otherwise without chest pain, palpitations, dizziness, lightheadedness. He was transferring from his wheelchair to his bed when he suddenly felt an ICD shock prompting him to present to the ED.  S-ICD Interrogation showed an inappropriate ICD shock for device oversensing (see procedure note for full interrogation). Patient denies prior ICD shocks in either devices that he has had.        1. Inappropriate S-ICD shock for device oversensing  2. Acute decompensated heart failure   3. Lower extremity Cellulitis     - ICD remains deactivated for inappropriate device oversensing leading to inappropriate shock likely in setting of new RBBB on EKG which was not present in 2020 EKG.   - R2 Pads on patient at all times   - Currently being treated for HF exacerbation with IV Bumex and cellulitis with oral antibiotics   - Patient has opted for S-ICD removal with reimplant of EV-ICD once medically stable   - Awaits consult with HF for optimization.  Please avoid Farxiga/ Jardiance medications in preparation for surgical procedure.   - Above discussed with primary team     YAIMA Ryan-BC  teams     63 y.o. male with hx of ischemic cardiomyopathy with HFrEF 30%, cardiac arrest with prior transvenous ICD extraction for infection and subsequent [employer] S-ICD implant 2020, CKD 3, HTN, T2 DM, COPD, LUIS ALFREDO, and LLE amputee who presented after ICD shock.  Patient admits he's had complaints of respiratory congestion, LE swelling and dyspnea for the past few months, he was on varying doses of bumex at home, recently increased to 2mg BID about 2weeks ago. He was doing well otherwise without chest pain, palpitations, dizziness, lightheadedness. He was transferring from his wheelchair to his bed when he suddenly felt an ICD shock prompting him to present to the ED.  S-ICD Interrogation showed an inappropriate ICD shock for device oversensing (see procedure note for full interrogation). Patient denies prior ICD shocks in either devices that he has had.        1. Inappropriate S-ICD shock for device oversensing  2. Acute decompensated heart failure   3. right lower extremity Cellulitis, wound    - ICD remains deactivated for inappropriate device oversensing leading to inappropriate shock likely in setting of new RBBB on EKG which was not present in 2020 EKG.   - R2 Pads on patient at all times   - Currently being treated for HF exacerbation with IV Bumex and cellulitis with oral antibiotics   - Patient has opted for S-ICD removal with reimplant of EV-ICD once medically stable   - Awaits consult with HF for optimization.  Please avoid Farxiga/ Jardiance medications in preparation for surgical procedure.   - Above discussed with primary team     YAIMA Ryan-BC  teams

## 2024-08-28 NOTE — CONSULT NOTE ADULT - SUBJECTIVE AND OBJECTIVE BOX
Heart Failure Consult Note   [Please check [email or web address] password: [email or web address]" for cardiology service schedule and contact information]    HPI:  63M PMHx HFrEF s/p ICD, HTN, T2DM, s/p LLE amputation   Pt presented w/ ICD shock 30min prior to arrival to ED. Pt reports he's been having progressively worsening GREWAL, orthopnea and SOB for the past few days.     In the ED, BP on the softer side, otherwise VSS. OK on RA.   CBC w/ wbc 7.2, hgb 9.3, plt 147.   CMP w/ SCr 1.66, LFT unremarkable. Trop 77, 74. proBNP 3255.   CXR w/ RLL opacity, cannot r/o infection.     ICD interrogated by EP in the ED, ICD oversensing and shock was inappropriate.   Pt also found w/ new RBBB.   ED rec c/w beta blockers.   Pt received full dose asa and bumex in the ED for fluid overload.   Admit for ADHF.  (25 Aug 2024 00:08)    Patient with known ischemic cardiomyopathy  Previously seen and followed by Heart Failure service but lost to follow up- patient spends 6 months in [facility], 6 months in [place]  Presenting after ICD shock- found to be inappropriate shock due to oversensing  Pending ICD explant and EV-ICD placement w/ EP.  HF consulted for optimization.    Patient with worsening LE edema and weight gain over past month but particularly over past week.  Recent admission in [facility] ~2 months ago for osteomyelitis/LLE amp- was discharged on Bumex 1mg BID instead of prior dose of 4mg BID.  Has been diuresed w/ 4mg IV bumex BID since admission with large volume of UOP and weight loss    PAST MEDICAL & SURGICAL HISTORY:  Stented coronary artery      Diabetes      AICD (automatic cardioverter/defibrillator) present      Hypertension      Heart failure with reduced ejection fraction      History of ischemic cardiomyopathy      History of COPD      H/O gastroesophageal reflux (GERD)      2019 novel coronavirus disease (COVID-19)      COVID-19 vaccine series completed      H/O vasectomy  20 yrs ago (2000)        FAMILY HISTORY:  Family history of COPD (chronic obstructive pulmonary disease) (Sibling)    Family history of cardiac disorder  Paternal      SOCIAL HISTORY:  unchanged    MEDICATIONS:  aspirin enteric coated 81 milliGRAM(s) Oral daily  buMETAnide IVPB 4 milliGRAM(s) IV Intermittent <User Schedule>  clopidogrel Tablet 75 milliGRAM(s) Oral daily  metoprolol succinate ER 25 milliGRAM(s) Oral daily  sacubitril 24 mG/valsartan 26 mG 1 Tablet(s) Oral every 12 hours    levoFLOXacin  Tablet 500 milliGRAM(s) Oral every 24 hours      acetaminophen     Tablet .. 650 milliGRAM(s) Oral every 6 hours PRN  melatonin 3 milliGRAM(s) Oral at bedtime PRN  ondansetron Injectable 4 milliGRAM(s) IV Push every 8 hours PRN  oxyCODONE    IR 5 milliGRAM(s) Oral daily PRN    aluminum hydroxide/magnesium hydroxide/simethicone Suspension 30 milliLiter(s) Oral every 4 hours PRN  pantoprazole    Tablet 40 milliGRAM(s) Oral before breakfast    dextrose 50% Injectable 25 Gram(s) IV Push once  dextrose 50% Injectable 25 Gram(s) IV Push once  dextrose 50% Injectable 12.5 Gram(s) IV Push once  dextrose Oral Gel 15 Gram(s) Oral once PRN  glucagon  Injectable 1 milliGRAM(s) IntraMuscular once  insulin glargine Injectable (LANTUS) 30 Unit(s) SubCutaneous at bedtime  insulin lispro (ADMELOG) corrective regimen sliding scale   SubCutaneous three times a day before meals  insulin lispro (ADMELOG) corrective regimen sliding scale   SubCutaneous at bedtime  insulin lispro Injectable (ADMELOG) 10 Unit(s) SubCutaneous three times a day before meals  rosuvastatin 20 milliGRAM(s) Oral at bedtime    ammonium lactate 12% Lotion 1 Application(s) Topical every 12 hours  dextrose 5%. 1000 milliLiter(s) IV Continuous <Continuous>  dextrose 5%. 1000 milliLiter(s) IV Continuous <Continuous>  sodium chloride 0.65% Nasal 2 Spray(s) Both Nostrils four times a day  tamsulosin 0.4 milliGRAM(s) Oral at bedtime      REVIEW OF SYSTEMS:  CV: chest pain (-), palpitation (-), orthopnea (-), PND (-), edema (+)  PULM: SOB (+), cough (+), wheezing (-), hemoptysis (-).   CONST: fever (-), chills (-) or fatigue (-)  GI: abdominal distension (+), abdominal pain (-) , nausea/vomiting (-), hematemesis, (-), melena (-), hematochezia (-)  : dysuria (-), frequency (-), hematuria (-).   NEURO: numbness (-), weakness (-), dizziness (-)  SKIN: itching (-), rash (-)  HEENT:  visual changes (-); vertigo or throat pain (-);  neck stiffness (-)     All other review of systems is negative unless indicated above.   -------------------------------------------------------------------------------------------  PHYSICAL EXAM:  T(C): 36.6 (08-28-24 @ 11:48), Max: 37 (08-28-24 @ 04:00)  HR: 91 (08-28-24 @ 11:48) (90 - 110)  BP: 98/63 (08-28-24 @ 11:48) (98/63 - 122/66)  RR: 18 (08-28-24 @ 11:48) (18 - 18)  SpO2: 94% (08-28-24 @ 11:48) (93% - 97%)  Wt(kg): --  I&O's Summary    27 Aug 2024 07:01  -  28 Aug 2024 07:00  --------------------------------------------------------  IN: 540 mL / OUT: 4600 mL / NET: -4060 mL    28 Aug 2024 07:01  -  28 Aug 2024 12:45  --------------------------------------------------------  IN: 360 mL / OUT: 1050 mL / NET: -690 mL        GENERAL: NAD  HEAD: Atraumatic, Normocephalic.  EYES: EOMI, PERRLA, conjunctiva and sclera clear.  ENT: Moist mucous membranes.  NECK: Supple, + JVD.  CHEST/LUNG: Diminished breath sounds at bases bilaterally  HEART: Regular rate and rhythm; No murmurs, rubs, or gallops.  ABDOMEN: Bowel sounds present; Mild abdominal distension  EXTREMITIES:  +LLE amputation. Chronic venous stasis changes of RLE. +pitting edema to sacrum  PSYCH: Normal affect.  SKIN: No rashes or lesions.    -------------------------------------------------------------------------------------------  LABS:                          9.8    7.96  )-----------( 163      ( 28 Aug 2024 06:33 )             35.4     08-28    141  |  100  |  38<H>  ----------------------------<  116<H>  3.8   |  28  |  2.01<H>    Ca    8.9      28 Aug 2024 06:33  Mg     2.3     08-28        CARDIAC MARKERS ( 24 Aug 2024 21:33 )  74 ng/L / x     / x     / x     / x     / x      CARDIAC MARKERS ( 24 Aug 2024 20:03 )  77 ng/L / x     / x     / x     / x     / x          levoFLOXacin  Tablet 500 milliGRAM(s) Oral every 24 hours      acetaminophen     Tablet .. 650 milliGRAM(s) Oral every 6 hours PRN  melatonin 3 milliGRAM(s) Oral at bedtime PRN  ondansetron Injectable 4 milliGRAM(s) IV Push every 8 hours PRN  oxyCODONE    IR 5 milliGRAM(s) Oral daily PRN

## 2024-08-28 NOTE — CONSULT NOTE ADULT - PROBLEM SELECTOR RECOMMENDATION 4
ICD w/ inappropriate shock  Pending extraction and EV-ICD reimplantation once optimized- still requires significant diuresis prior  EP following

## 2024-08-28 NOTE — PROGRESS NOTE ADULT - ASSESSMENT
63 year old Male with PMHx HFrEF s/p ICD, HTN, T2DM, s/p LLE amputation in [place] ~2 months ago. Pt presented to ED for ICD shock.        1- CKDIII  2- CHF  3- DM2  4- anemia  5- cellulitis       per chart review, creatinine ranges higher   creatinine is rising as fluid status is slowly improving   creatinine expected to rise and fluid status improves  continue bumex 4 mg iv bid  non oliguric  ICD deactivated, EP following for possible removal and re-implant  check U/A and urine protein/creatinine ratio  anemia, trend hgb  levofloxacin for cellulitis   continue entresto 24/w6 mg bid   trend bp  strict I/O  daily standing weight   trend creatinine and electrolytes daily

## 2024-08-28 NOTE — PROGRESS NOTE ADULT - SUBJECTIVE AND OBJECTIVE BOX
24H hour events:     MEDICATIONS:  aspirin enteric coated 81 milliGRAM(s) Oral daily  buMETAnide IVPB 4 milliGRAM(s) IV Intermittent <User Schedule>  clopidogrel Tablet 75 milliGRAM(s) Oral daily  metoprolol succinate ER 25 milliGRAM(s) Oral daily  sacubitril 24 mG/valsartan 26 mG 1 Tablet(s) Oral every 12 hours  levoFLOXacin  Tablet 500 milliGRAM(s) Oral every 24 hours  acetaminophen     Tablet .. 650 milliGRAM(s) Oral every 6 hours PRN  melatonin 3 milliGRAM(s) Oral at bedtime PRN  ondansetron Injectable 4 milliGRAM(s) IV Push every 8 hours PRN  oxyCODONE    IR 5 milliGRAM(s) Oral daily PRN    aluminum hydroxide/magnesium hydroxide/simethicone Suspension 30 milliLiter(s) Oral every 4 hours PRN  pantoprazole    Tablet 40 milliGRAM(s) Oral before breakfast    dextrose 50% Injectable 25 Gram(s) IV Push once  dextrose 50% Injectable 12.5 Gram(s) IV Push once  dextrose 50% Injectable 25 Gram(s) IV Push once  dextrose Oral Gel 15 Gram(s) Oral once PRN  glucagon  Injectable 1 milliGRAM(s) IntraMuscular once  insulin glargine Injectable (LANTUS) 30 Unit(s) SubCutaneous at bedtime  insulin lispro (ADMELOG) corrective regimen sliding scale   SubCutaneous three times a day before meals  insulin lispro (ADMELOG) corrective regimen sliding scale   SubCutaneous at bedtime  insulin lispro Injectable (ADMELOG) 10 Unit(s) SubCutaneous three times a day before meals  rosuvastatin 20 milliGRAM(s) Oral at bedtime    ammonium lactate 12% Lotion 1 Application(s) Topical every 12 hours  dextrose 5%. 1000 milliLiter(s) IV Continuous <Continuous>  dextrose 5%. 1000 milliLiter(s) IV Continuous <Continuous>  sodium chloride 0.65% Nasal 2 Spray(s) Both Nostrils four times a day  tamsulosin 0.4 milliGRAM(s) Oral at bedtime      REVIEW OF SYSTEMS:  Complete 12point ROS negative.    PHYSICAL EXAM:  T(C): 37 (08-28-24 @ 04:00), Max: 37 (08-28-24 @ 04:00)  HR: 102 (08-28-24 @ 05:40) (85 - 110)  BP: 105/70 (08-28-24 @ 05:40) (94/59 - 122/66)  RR: 18 (08-28-24 @ 04:00) (18 - 18)  SpO2: 95% (08-28-24 @ 04:00) (93% - 97%)  Wt(kg): --  I&O's Summary    27 Aug 2024 07:01  -  28 Aug 2024 07:00  --------------------------------------------------------  IN: 540 mL / OUT: 4600 mL / NET: -4060 mL        Appearance: Normal	  HEENT:   Normal oral mucosa, PERRL, EOMI	  Cardiovascular: Normal S1 S2, No JVD, No murmurs, No edema  Respiratory: Lungs clear to auscultation	  Psychiatry: A & O x 3, Mood & affect appropriate  Gastrointestinal:  Soft, Non-tender, + BS	  Skin: No rashes, No ecchymoses, No cyanosis	  Neurologic: Non-focal  Extremities: Normal range of motion, No clubbing, cyanosis or edema  Vascular: Peripheral pulses palpable 2+ bilaterally        LABS:	 	    CBC Full  -  ( 28 Aug 2024 06:33 )  WBC Count : 7.96 K/uL  Hemoglobin : 9.8 g/dL  Hematocrit : 35.4 %  Platelet Count - Automated : 163 K/uL  Mean Cell Volume : 72.8 fl  Mean Cell Hemoglobin : 20.2 pg  Mean Cell Hemoglobin Concentration : 27.7 gm/dL  Auto Neutrophil # : x  Auto Lymphocyte # : x  Auto Monocyte # : x  Auto Eosinophil # : x  Auto Basophil # : x  Auto Neutrophil % : x  Auto Lymphocyte % : x  Auto Monocyte % : x  Auto Eosinophil % : x  Auto Basophil % : x    08-28    141  |  100  |  38<H>  ----------------------------<  116<H>  3.8   |  28  |  2.01<H>  08-27    140  |  101  |  36<H>  ----------------------------<  117<H>  3.8   |  26  |  1.87<H>    Ca    8.9      28 Aug 2024 06:33  Ca    8.5      27 Aug 2024 06:38  Mg     2.3     08-28  Mg     2.3     08-27      TELEMETRY: 	  NSR 's  EKG: NSR with RBBB    TTE:   TRANSTHORACIC ECHOCARDIOGRAM REPORT  Pt. Name:       SEVERIANO MARTINEZ Study Date:    8/26/2024  MRN:            [id number]        YOB: 1960  Accession #:    [id number]         Age:           63 years  Account#:       [id number]      Gender:        M  Heart Rate:     85 bpm            Height:        77.00 in (195.58 cm)  Rhythm:         sinus rhythm      Weight:        263.00 lb (119.30 kg)  Blood Pressure: 103/62 mmHg       BSA/BMI:       2.51 m² / 31.19 kg/m²  ________________________________________________________________________________________  Referring Physician:    [id number] Destiny Sauer  Interpreting Physician: Garrett Menjivar MD  Primary Sonographer:    Cara WALDEN    CPT:                ECHO TTE WITH CON COMP W DOPP - .m;DEFINITY ECHO                      CONTRAST PER ML - .m;DEFINITY ECHO CONTRAST PER ML             WASTED - .m  Indication(s):      Heart failure, unspecified - I50.9  Procedure:          Transthoracic echocardiogram with 2-D, M-mode and complete                      spectral and color flow Doppler.  Ordering Location:  [facility]  Admission Status:   Inpatient  Contrast Injection: Verbal consent was obtained for injection of Ultrasonic                      Enhancing Agent following a discussion of risks and                      benefits.                      Endocardial visualization enhanced with 2 ml of Definity                      Ultrasound enhancing agent (Lot#:[id number] Exp.Date:04/2025                      Discarded Dose:8ml).  UEA Reaction:       Patient had no adverse reaction after injection of                      Ultrasound Enhancing Agent.  Study Information:  Image quality for this study is adequate.  CONCLUSIONS:      1. Left ventricular cavity is severely dilated. Left ventricular wall thickness is normal. Left ventricular systolic function is severely decreased with an ejection fraction of 22 % by Bell's method of disks. Regional wall motion abnormalities present.   2. Multiple segmental abnormalities exist. See findings.   3. Normal right ventricular cavity size, with normal wall thickness, and probably normal right ventricular systolic function.   4. Estimated pulmonary artery systolic pressure is 30 mmHg.   5. Trace pericardial effusion.    FINDINGS:  Left Ventricle:  After obtaining consent, Definity ultrasound enhancing agent was given for enhanced left ventricular opacification and improved delineation of the left ventricular endocardial borders. The left ventricular cavity is severely dilated. Left ventricular wall thickness is normal. Left ventricular systolic function is severely decreased with a calculated ejection fraction of 22 % by the Bell's biplane method of disks. There are regional wall motion abnormalities present. Elevated left ventricular filling pressure.  LV Wall Scoring: The entire apex, entire anterior wall, basal and mid anterior  septum, and mid inferolateral segment are akinetic. The basal and mid  anterolateral wall, basal and mid inferior septum, basal and mid inferior wall,  and basal inferolateral segment are hypokinetic.     Right Ventricle:  The right ventricular cavity is normal in size, with normal wall thickness and right ventricular systolic function is probably normal. Tricuspid annular plane systolic excursion (TAPSE) is 2.0 cm (normal >=1.7 cm).     Left Atrium:  The left atrium is normal in size with an indexed volume of 28.34 ml/m².     Right Atrium:  The right atrium is severely dilated with an indexed volume of 42.58 ml/m².     Interatrial Septum:  The interatrial septum appears intact.     Aortic Valve:  Normal aortic valve. There is no aortic valve stenosis.     Mitral Valve:  There is no mitral valve stenosis. There is mild mitral regurgitation.     Tricuspid Valve:  Structurally normal tricuspid valve with normal leaflet excursion. There is massive tricuspid regurgitation. Estimated pulmonary artery systolic pressure is 30 mmHg.     Pulmonic Valve:  Normal pulmonic valve. There is trace pulmonic regurgitation.     Aorta:  The aortic root appears normal in size.     Pericardium:  There is a trace pericardial effusion.     Systemic Veins:  The inferior vena cava is normal in size (normal <2.1cm) with normal inspiratory collapse (normal >50%) consistent with normal right atrial pressure (~3, range 0-5mmHg).  ____________________________________________________________________  QUANTITATIVE DATA:  Left Ventricle Measurements: (Indexed to BSA)     IVSd (2D):   0.9 cm  LVPWd (2D):  1.4 cm  LVIDd (2D):  6.8 cm  LVIDs (2D):  5.7 cm  LV Mass:     357 g  141.9 g/m²  LV Vol d, MOD A2C: 268.0 ml 106.66 ml/m²  LV Vol d, MOD A4C: 246.0 ml 97.90 ml/m²  LV Vol d, MOD BP:  260.0 ml 103.48 ml/m²  LV Vol s, MOD A2C: 209.0 ml 83.18 ml/m²  LV Vol s, MOD A4C: 191.0 ml 76.01 ml/m²  LV Vol s, MOD BP:  202.5 ml 80.58 ml/m²  LVOT SV MOD BP:    57.6 ml  LV EF% MOD BP:     22 %     MV E Vmax:    0.85 m/s  MV A Vmax:    0.56 m/s  MV E/A:       1.53  e' lateral:   5.66 cm/s  e' medial:    5.44 cm/s  E/e' lateral: 15.07  E/e' medial:  15.68  E/e' Average: 15.37    Aorta Measurements: (Normal range) (Indexed to BSA)     Ao Root d     3.30 cm (3.1 - 3.7 cm) 1.31 cm/m²  Ao Annulus:   2.1 cm (2.3 - 2.9 cm)  Ao Asc d, 2D: 3.60  Ao Asc prox:  3.50 cm                1.39 cm/m²     Left Atrium Measurements: (Indexed to BSA)  LA Diam 2D:        4.80 cm  LA Vol s, MOD A4C: 76.70 ml.  LA Vol s, MOD A2C: 66.20 ml.  LA Vol s, MOD BP:  71.20 ml  28.34 ml/m²    Right Ventricle Measurements: Right Atrial Measurements:     TAPSE:      2.0 cm            RA Vol:       107.00 ml  RV S' Vmax: 9.57 cm/s         RA Vol Index: 42.58 ml/m²    LVOT / RVOT/ Qp/Qs Data: (Indexed to BSA)  LVOT Diameter:  2.10 cm  LVOT Area:      3.46 cm²  LVOT Vmax:      0.55 m/s  LVOT Vmn:       0.351 m/s  LVOT VTI:       10.50 cm  LVOT peak grad: 1 mmHg  LVOT mean grad: 1.0 mmHg  LVOT SV:        36.4 ml   14.47 ml/m²    Mitral Valve Measurements:     MV E Vmax: 0.9 m/s  MV A Vmax: 0.6 m/s  MV E/A:    1.5    Tricuspid Valve Measurements:     TR Vmean:          2.0 m/s  TR Vmax:           2.6 m/s  TR Peak Gradient:  26.6 mmHg  RA Pressure:       3 mmHg  PASP:              30 mmHg  TR PISA Radius:    0.80 cm  TR Aliasing Larry:   0.38 m/s  TR Inst Flow Rate: 154.8 ml/s  TR VTI:            85.80 cm  TR Eff KATHY:        0.60 cm²  TR Reg Volume:     51.49 ml  ________________________________________________________________________________________  Electronically signed on 8/26/2024 at 3:37:27 PM by Garrett Menjivar MD   *** Final ***         24H hour events: Patient admits that RLE had  been weeeping serous colored fluid, now with Gauze and bruce on right foot and mid lower extremity.     MEDICATIONS:  aspirin enteric coated 81 milliGRAM(s) Oral daily  buMETAnide IVPB 4 milliGRAM(s) IV Intermittent <User Schedule>  clopidogrel Tablet 75 milliGRAM(s) Oral daily  metoprolol succinate ER 25 milliGRAM(s) Oral daily  sacubitril 24 mG/valsartan 26 mG 1 Tablet(s) Oral every 12 hours  levoFLOXacin  Tablet 500 milliGRAM(s) Oral every 24 hours  acetaminophen     Tablet .. 650 milliGRAM(s) Oral every 6 hours PRN  melatonin 3 milliGRAM(s) Oral at bedtime PRN  ondansetron Injectable 4 milliGRAM(s) IV Push every 8 hours PRN  oxyCODONE    IR 5 milliGRAM(s) Oral daily PRN    aluminum hydroxide/magnesium hydroxide/simethicone Suspension 30 milliLiter(s) Oral every 4 hours PRN  pantoprazole    Tablet 40 milliGRAM(s) Oral before breakfast    dextrose 50% Injectable 25 Gram(s) IV Push once  dextrose 50% Injectable 12.5 Gram(s) IV Push once  dextrose 50% Injectable 25 Gram(s) IV Push once  dextrose Oral Gel 15 Gram(s) Oral once PRN  glucagon  Injectable 1 milliGRAM(s) IntraMuscular once  insulin glargine Injectable (LANTUS) 30 Unit(s) SubCutaneous at bedtime  insulin lispro (ADMELOG) corrective regimen sliding scale   SubCutaneous three times a day before meals  insulin lispro (ADMELOG) corrective regimen sliding scale   SubCutaneous at bedtime  insulin lispro Injectable (ADMELOG) 10 Unit(s) SubCutaneous three times a day before meals  rosuvastatin 20 milliGRAM(s) Oral at bedtime    ammonium lactate 12% Lotion 1 Application(s) Topical every 12 hours  dextrose 5%. 1000 milliLiter(s) IV Continuous <Continuous>  dextrose 5%. 1000 milliLiter(s) IV Continuous <Continuous>  sodium chloride 0.65% Nasal 2 Spray(s) Both Nostrils four times a day  tamsulosin 0.4 milliGRAM(s) Oral at bedtime      REVIEW OF SYSTEMS:  Complete 12point ROS negative.    PHYSICAL EXAM:  T(C): 37 (08-28-24 @ 04:00), Max: 37 (08-28-24 @ 04:00)  HR: 102 (08-28-24 @ 05:40) (85 - 110)  BP: 105/70 (08-28-24 @ 05:40) (94/59 - 122/66)  RR: 18 (08-28-24 @ 04:00) (18 - 18)  SpO2: 95% (08-28-24 @ 04:00) (93% - 97%)    27 Aug 2024 07:01  -  28 Aug 2024 07:00  --------------------------------------------------------  IN: 540 mL / OUT: 4600 mL / NET: -4060 mL    Appearance: Normal	  HEENT:   Normal oral mucosa, PERRL, EOMI	  Cardiovascular: Normal S1 S2, regular. No JVD, No murmurs, No edema  Respiratory: Left lower lobe with crackles   Psychiatry: A & O x 3, Mood & affect appropriate  Gastrointestinal:  Soft, Non-tender, + BS	  Skin: RLE had been weeeping serous colored fluid, now with Gauze and bruce on right foot and mid lower extremity.   Extremities: Normal range of motion, No clubbing, cyanosis.  Right lower extremity with +2 non pitting edema and chronic venous stasis  Vascular: Peripheral pulses palpable 2+ bilaterally      LABS:	 	    CBC Full  -  ( 28 Aug 2024 06:33 )  WBC Count : 7.96 K/uL  Hemoglobin : 9.8 g/dL  Hematocrit : 35.4 %  Platelet Count - Automated : 163 K/uL  Mean Cell Volume : 72.8 fl  Mean Cell Hemoglobin : 20.2 pg  Mean Cell Hemoglobin Concentration : 27.7 gm/dL  Auto Neutrophil # : x  Auto Lymphocyte # : x  Auto Monocyte # : x  Auto Eosinophil # : x  Auto Basophil # : x  Auto Neutrophil % : x  Auto Lymphocyte % : x  Auto Monocyte % : x  Auto Eosinophil % : x  Auto Basophil % : x    08-28    141  |  100  |  38<H>  ----------------------------<  116<H>  3.8   |  28  |  2.01<H>  08-27    140  |  101  |  36<H>  ----------------------------<  117<H>  3.8   |  26  |  1.87<H>    Ca    8.9      28 Aug 2024 06:33  Ca    8.5      27 Aug 2024 06:38  Mg     2.3     08-28  Mg     2.3     08-27      TELEMETRY: 	  NSR 's  EKG: NSR with RBBB    TTE:   TRANSTHORACIC ECHOCARDIOGRAM REPORT  Pt. Name:       SEVERIANO MARTINEZ Study Date:    8/26/2024  MRN:            [id number]        YOB: 1960  Accession #:    [id number]         Age:           63 years  Account#:       [id number]      Gender:        M  Heart Rate:     85 bpm            Height:        77.00 in (195.58 cm)  Rhythm:         sinus rhythm      Weight:        263.00 lb (119.30 kg)  Blood Pressure: 103/62 mmHg       BSA/BMI:       2.51 m² / 31.19 kg/m²  ________________________________________________________________________________________  Referring Physician:    [id number] Destiny Sauer  Interpreting Physician: Garrett Menjivar MD  Primary Sonographer:    Cara WALDEN    CPT:                ECHO TTE WITH CON COMP W DOPP - .m;DEFINITY ECHO                      CONTRAST PER ML - .m;DEFINITY ECHO CONTRAST PER ML             WASTED - .m  Indication(s):      Heart failure, unspecified - I50.9  Procedure:          Transthoracic echocardiogram with 2-D, M-mode and complete                      spectral and color flow Doppler.  Ordering Location:  [facility]  Admission Status:   Inpatient  Contrast Injection: Verbal consent was obtained for injection of Ultrasonic                      Enhancing Agent following a discussion of risks and                      benefits.                      Endocardial visualization enhanced with 2 ml of Definity                      Ultrasound enhancing agent (Lot#:[id number] Exp.Date:04/2025                      Discarded Dose:8ml).  UEA Reaction:       Patient had no adverse reaction after injection of                      Ultrasound Enhancing Agent.  Study Information:  Image quality for this study is adequate.  CONCLUSIONS:      1. Left ventricular cavity is severely dilated. Left ventricular wall thickness is normal. Left ventricular systolic function is severely decreased with an ejection fraction of 22 % by Bell's method of disks. Regional wall motion abnormalities present.   2. Multiple segmental abnormalities exist. See findings.   3. Normal right ventricular cavity size, with normal wall thickness, and probably normal right ventricular systolic function.   4. Estimated pulmonary artery systolic pressure is 30 mmHg.   5. Trace pericardial effusion.    FINDINGS:  Left Ventricle:  After obtaining consent, Definity ultrasound enhancing agent was given for enhanced left ventricular opacification and improved delineation of the left ventricular endocardial borders. The left ventricular cavity is severely dilated. Left ventricular wall thickness is normal. Left ventricular systolic function is severely decreased with a calculated ejection fraction of 22 % by the Bell's biplane method of disks. There are regional wall motion abnormalities present. Elevated left ventricular filling pressure.  LV Wall Scoring: The entire apex, entire anterior wall, basal and mid anterior  septum, and mid inferolateral segment are akinetic. The basal and mid  anterolateral wall, basal and mid inferior septum, basal and mid inferior wall,  and basal inferolateral segment are hypokinetic.     Right Ventricle:  The right ventricular cavity is normal in size, with normal wall thickness and right ventricular systolic function is probably normal. Tricuspid annular plane systolic excursion (TAPSE) is 2.0 cm (normal >=1.7 cm).     Left Atrium:  The left atrium is normal in size with an indexed volume of 28.34 ml/m².     Right Atrium:  The right atrium is severely dilated with an indexed volume of 42.58 ml/m².     Interatrial Septum:  The interatrial septum appears intact.     Aortic Valve:  Normal aortic valve. There is no aortic valve stenosis.     Mitral Valve:  There is no mitral valve stenosis. There is mild mitral regurgitation.     Tricuspid Valve:  Structurally normal tricuspid valve with normal leaflet excursion. There is massive tricuspid regurgitation. Estimated pulmonary artery systolic pressure is 30 mmHg.     Pulmonic Valve:  Normal pulmonic valve. There is trace pulmonic regurgitation.     Aorta:  The aortic root appears normal in size.     Pericardium:  There is a trace pericardial effusion.     Systemic Veins:  The inferior vena cava is normal in size (normal <2.1cm) with normal inspiratory collapse (normal >50%) consistent with normal right atrial pressure (~3, range 0-5mmHg).  ____________________________________________________________________  QUANTITATIVE DATA:  Left Ventricle Measurements: (Indexed to BSA)     IVSd (2D):   0.9 cm  LVPWd (2D):  1.4 cm  LVIDd (2D):  6.8 cm  LVIDs (2D):  5.7 cm  LV Mass:     357 g  141.9 g/m²  LV Vol d, MOD A2C: 268.0 ml 106.66 ml/m²  LV Vol d, MOD A4C: 246.0 ml 97.90 ml/m²  LV Vol d, MOD BP:  260.0 ml 103.48 ml/m²  LV Vol s, MOD A2C: 209.0 ml 83.18 ml/m²  LV Vol s, MOD A4C: 191.0 ml 76.01 ml/m²  LV Vol s, MOD BP:  202.5 ml 80.58 ml/m²  LVOT SV MOD BP:    57.6 ml  LV EF% MOD BP:     22 %     MV E Vmax:    0.85 m/s  MV A Vmax:    0.56 m/s  MV E/A:       1.53  e' lateral:   5.66 cm/s  e' medial:    5.44 cm/s  E/e' lateral: 15.07  E/e' medial:  15.68  E/e' Average: 15.37    Aorta Measurements: (Normal range) (Indexed to BSA)     Ao Root d     3.30 cm (3.1 - 3.7 cm) 1.31 cm/m²  Ao Annulus:   2.1 cm (2.3 - 2.9 cm)  Ao Asc d, 2D: 3.60  Ao Asc prox:  3.50 cm                1.39 cm/m²     Left Atrium Measurements: (Indexed to BSA)  LA Diam 2D:        4.80 cm  LA Vol s, MOD A4C: 76.70 ml.  LA Vol s, MOD A2C: 66.20 ml.  LA Vol s, MOD BP:  71.20 ml  28.34 ml/m²    Right Ventricle Measurements: Right Atrial Measurements:     TAPSE:      2.0 cm            RA Vol:       107.00 ml  RV S' Vmax: 9.57 cm/s         RA Vol Index: 42.58 ml/m²    LVOT / RVOT/ Qp/Qs Data: (Indexed to BSA)  LVOT Diameter:  2.10 cm  LVOT Area:      3.46 cm²  LVOT Vmax:      0.55 m/s  LVOT Vmn:       0.351 m/s  LVOT VTI:       10.50 cm  LVOT peak grad: 1 mmHg  LVOT mean grad: 1.0 mmHg  LVOT SV:        36.4 ml   14.47 ml/m²    Mitral Valve Measurements:     MV E Vmax: 0.9 m/s  MV A Vmax: 0.6 m/s  MV E/A:    1.5    Tricuspid Valve Measurements:     TR Vmean:          2.0 m/s  TR Vmax:           2.6 m/s  TR Peak Gradient:  26.6 mmHg  RA Pressure:       3 mmHg  PASP:              30 mmHg  TR PISA Radius:    0.80 cm  TR Aliasing Larry:   0.38 m/s  TR Inst Flow Rate: 154.8 ml/s  TR VTI:            85.80 cm  TR Eff KATHY:        0.60 cm²  TR Reg Volume:     51.49 ml  ________________________________________________________________________________________  Electronically signed on 8/26/2024 at 3:37:27 PM by Garrett Menjivar MD   *** Final ***

## 2024-08-29 LAB
ANION GAP SERPL CALC-SCNC: 16 MMOL/L — SIGNIFICANT CHANGE UP (ref 5–17)
BUN SERPL-MCNC: 40 MG/DL — HIGH (ref 7–23)
CALCIUM SERPL-MCNC: 8.9 MG/DL — SIGNIFICANT CHANGE UP (ref 8.4–10.5)
CHLORIDE SERPL-SCNC: 98 MMOL/L — SIGNIFICANT CHANGE UP (ref 96–108)
CO2 SERPL-SCNC: 27 MMOL/L — SIGNIFICANT CHANGE UP (ref 22–31)
CREAT SERPL-MCNC: 1.86 MG/DL — HIGH (ref 0.5–1.3)
EGFR: 41 ML/MIN/1.73M2 — LOW
FERRITIN SERPL-MCNC: 51 NG/ML — SIGNIFICANT CHANGE UP (ref 30–400)
GLUCOSE BLDC GLUCOMTR-MCNC: 124 MG/DL — HIGH (ref 70–99)
GLUCOSE BLDC GLUCOMTR-MCNC: 128 MG/DL — HIGH (ref 70–99)
GLUCOSE BLDC GLUCOMTR-MCNC: 132 MG/DL — HIGH (ref 70–99)
GLUCOSE BLDC GLUCOMTR-MCNC: 148 MG/DL — HIGH (ref 70–99)
GLUCOSE SERPL-MCNC: 116 MG/DL — HIGH (ref 70–99)
HCT VFR BLD CALC: 35 % — LOW (ref 39–50)
HGB BLD-MCNC: 9.6 G/DL — LOW (ref 13–17)
IRON SATN MFR SERPL: 24 UG/DL — LOW (ref 45–165)
IRON SATN MFR SERPL: 6 % — LOW (ref 16–55)
MAGNESIUM SERPL-MCNC: 2.3 MG/DL — SIGNIFICANT CHANGE UP (ref 1.6–2.6)
MCHC RBC-ENTMCNC: 19.7 PG — LOW (ref 27–34)
MCHC RBC-ENTMCNC: 27.4 GM/DL — LOW (ref 32–36)
MCV RBC AUTO: 71.7 FL — LOW (ref 80–100)
NRBC # BLD: 0 /100 WBCS — SIGNIFICANT CHANGE UP (ref 0–0)
PLATELET # BLD AUTO: 158 K/UL — SIGNIFICANT CHANGE UP (ref 150–400)
POTASSIUM SERPL-MCNC: 3.3 MMOL/L — LOW (ref 3.5–5.3)
POTASSIUM SERPL-SCNC: 3.3 MMOL/L — LOW (ref 3.5–5.3)
RBC # BLD: 4.88 M/UL — SIGNIFICANT CHANGE UP (ref 4.2–5.8)
RBC # FLD: 21.2 % — HIGH (ref 10.3–14.5)
SODIUM SERPL-SCNC: 141 MMOL/L — SIGNIFICANT CHANGE UP (ref 135–145)
TIBC SERPL-MCNC: 395 UG/DL — SIGNIFICANT CHANGE UP (ref 220–430)
UIBC SERPL-MCNC: 371 UG/DL — HIGH (ref 110–370)
WBC # BLD: 6.95 K/UL — SIGNIFICANT CHANGE UP (ref 3.8–10.5)
WBC # FLD AUTO: 6.95 K/UL — SIGNIFICANT CHANGE UP (ref 3.8–10.5)

## 2024-08-29 RX ORDER — POTASSIUM CHLORIDE 10 MEQ
40 TABLET, EXT RELEASE, PARTICLES/CRYSTALS ORAL ONCE
Refills: 0 | Status: COMPLETED | OUTPATIENT
Start: 2024-08-29 | End: 2024-08-29

## 2024-08-29 RX ADMIN — BUMETANIDE 132 MILLIGRAM(S): 2 TABLET ORAL at 18:13

## 2024-08-29 RX ADMIN — Medication 75 MILLIGRAM(S): at 12:15

## 2024-08-29 RX ADMIN — SACUBITRIL AND VALSARTAN 1 TABLET(S): 49; 51 TABLET, FILM COATED ORAL at 06:31

## 2024-08-29 RX ADMIN — Medication 10 MILLIGRAM(S): at 14:21

## 2024-08-29 RX ADMIN — Medication 40 MILLIEQUIVALENT(S): at 21:50

## 2024-08-29 RX ADMIN — Medication 10 UNIT(S): at 18:14

## 2024-08-29 RX ADMIN — Medication 2 SPRAY(S): at 21:52

## 2024-08-29 RX ADMIN — OXYCODONE HYDROCHLORIDE 5 MILLIGRAM(S): 5 TABLET ORAL at 21:51

## 2024-08-29 RX ADMIN — METOPROLOL TARTRATE 25 MILLIGRAM(S): 100 TABLET ORAL at 08:46

## 2024-08-29 RX ADMIN — Medication 2 SPRAY(S): at 12:15

## 2024-08-29 RX ADMIN — OXYCODONE HYDROCHLORIDE 5 MILLIGRAM(S): 5 TABLET ORAL at 22:51

## 2024-08-29 RX ADMIN — Medication 81 MILLIGRAM(S): at 12:15

## 2024-08-29 RX ADMIN — Medication 10 MILLIGRAM(S): at 08:46

## 2024-08-29 RX ADMIN — INSULIN GLARGINE 30 UNIT(S): 100 INJECTION, SOLUTION SUBCUTANEOUS at 21:52

## 2024-08-29 RX ADMIN — Medication 10 UNIT(S): at 12:15

## 2024-08-29 RX ADMIN — Medication 10 MILLIGRAM(S): at 21:53

## 2024-08-29 RX ADMIN — TAMSULOSIN HYDROCHLORIDE 0.4 MILLIGRAM(S): 0.4 CAPSULE ORAL at 21:53

## 2024-08-29 RX ADMIN — BUMETANIDE 132 MILLIGRAM(S): 2 TABLET ORAL at 06:31

## 2024-08-29 RX ADMIN — Medication 1 APPLICATION(S): at 06:31

## 2024-08-29 RX ADMIN — Medication 1 APPLICATION(S): at 18:22

## 2024-08-29 RX ADMIN — SPIRONOLACTONE 25 MILLIGRAM(S): 25 TABLET, FILM COATED ORAL at 08:46

## 2024-08-29 RX ADMIN — ROSUVASTATIN CALCIUM 20 MILLIGRAM(S): 10 TABLET ORAL at 21:51

## 2024-08-29 RX ADMIN — Medication 40 MILLIGRAM(S): at 06:31

## 2024-08-29 RX ADMIN — Medication 10 UNIT(S): at 08:44

## 2024-08-29 RX ADMIN — SACUBITRIL AND VALSARTAN 1 TABLET(S): 49; 51 TABLET, FILM COATED ORAL at 18:14

## 2024-08-29 NOTE — PROGRESS NOTE ADULT - ASSESSMENT
63 year old Male with PMHx HFrEF s/p ICD, HTN, T2DM, s/p LLE amputation in [place] ~2 months ago. Pt presented to ED for ICD shock.        1- CKDIII  2- CHF  3- DM2  4- anemia  5- cellulitis       per chart review, creatinine ranges higher   creatinine is steady  creatinine expected to rise as fluid status improves  continue bumex 4 mg iv bid  aldactone 25 mg daily for CHF  non oliguric  ICD deactivated, EP following for possible removal and re-implant tuesday  anemia, trend hgb  levofloxacin 500 mg daily for cellulitis   continue entresto 24/46 mg bid   hydralazine 10 mg tid  trend bp  strict I/O  daily standing weight   trend creatinine and electrolytes daily

## 2024-08-29 NOTE — CONSULT NOTE ADULT - SUBJECTIVE AND OBJECTIVE BOX
HPI:  63 year old Male with PMHx HFrEF s/p ICD, HTN, T2DM, s/p LLE amputation in [place] ~2 months ago. Pt presented to ED for ICD shock 8/25/24.  and SOB. Being followed by cardiology nephrology for CKD  Seen for anemia    PAST MEDICAL & SURGICAL HISTORY:  Stented coronary artery      Diabetes      AICD (automatic cardioverter/defibrillator) present      Hypertension      Heart failure with reduced ejection fraction      History of ischemic cardiomyopathy      History of COPD      H/O gastroesophageal reflux (GERD)      2019 novel coronavirus disease (COVID-19)      COVID-19 vaccine series completed      H/O vasectomy  20 yrs ago (2000)      Allergies    ceftriaxone (Rash)  doxepin (Other)  Zosyn (Pruritus)  vancomycin (Rash (Mild to Mod))      FAMILY HISTORY:  Family history of COPD (chronic obstructive pulmonary disease) (Sibling)    Family history of cardiac disorder  Paternal      Social History:h/o  smoking 30+ years    Medications:  acetaminophen     Tablet .. 650 milliGRAM(s) Oral every 6 hours PRN Temp greater or equal to 38C (100.4F), Mild Pain (1 - 3)  ALPRAZolam 0.25 milliGRAM(s) Oral once  aluminum hydroxide/magnesium hydroxide/simethicone Suspension 30 milliLiter(s) Oral every 4 hours PRN Dyspepsia  ammonium lactate 12% Lotion 1 Application(s) Topical every 12 hours  aspirin enteric coated 81 milliGRAM(s) Oral daily  buMETAnide IVPB 4 milliGRAM(s) IV Intermittent <User Schedule>  clopidogrel Tablet 75 milliGRAM(s) Oral daily  dextrose 5%. 1000 milliLiter(s) IV Continuous <Continuous>  dextrose 5%. 1000 milliLiter(s) IV Continuous <Continuous>  dextrose 50% Injectable 25 Gram(s) IV Push once  dextrose 50% Injectable 12.5 Gram(s) IV Push once  dextrose 50% Injectable 25 Gram(s) IV Push once  dextrose Oral Gel 15 Gram(s) Oral once PRN Blood Glucose LESS THAN 70 milliGRAM(s)/deciliter  glucagon  Injectable 1 milliGRAM(s) IntraMuscular once  hydrALAZINE 10 milliGRAM(s) Oral three times a day  insulin glargine Injectable (LANTUS) 30 Unit(s) SubCutaneous at bedtime  insulin lispro (ADMELOG) corrective regimen sliding scale   SubCutaneous three times a day before meals  insulin lispro (ADMELOG) corrective regimen sliding scale   SubCutaneous at bedtime  insulin lispro Injectable (ADMELOG) 10 Unit(s) SubCutaneous three times a day before meals  levoFLOXacin  Tablet 500 milliGRAM(s) Oral every 24 hours  melatonin 3 milliGRAM(s) Oral at bedtime PRN Insomnia  metoprolol succinate ER 25 milliGRAM(s) Oral daily  ondansetron Injectable 4 milliGRAM(s) IV Push every 8 hours PRN Nausea and/or Vomiting  oxyCODONE    IR 5 milliGRAM(s) Oral daily PRN Severe Pain (7 - 10)  pantoprazole    Tablet 40 milliGRAM(s) Oral before breakfast  rosuvastatin 20 milliGRAM(s) Oral at bedtime  sacubitril 24 mG/valsartan 26 mG 1 Tablet(s) Oral every 12 hours  sodium chloride 0.65% Nasal 2 Spray(s) Both Nostrils four times a day  spironolactone 25 milliGRAM(s) Oral daily  tamsulosin 0.4 milliGRAM(s) Oral at bedtime    Labs:                        9.6    6.95  )-----------( 158      ( 29 Aug 2024 06:04 )             35.0   Hemoglobin: 9.6 g/dL (08.29.24 @ 06:04)   Hemoglobin: 9.8 g/dL (08.28.24 @ 06:33)   Hemoglobin: 9.5 g/dL (08.27.24 @ 06:38)   Hemoglobin: 9.4 g/dL (08.26.24 @ 07:13)   Hemoglobin: 9.5 g/dL (08.25.24 @ 17:44)   Hemoglobin: 9.4 g/dL (08.25.24 @ 06:50)   Hemoglobin: 9.3 g/dL (08.24.24 @ 20:03)   Hemoglobin: 12.2 g/dL (09.05.23 @ 13:13)   Mean Cell Volume: 73.7 fl (08.25.24 @ 06:50)   Mean Cell Volume: 73.9 fl (08.24.24 @ 20:03)   Mean Cell Volume: 78.7 fl (09.05.23 @ 13:13)   Mean Cell Volume: 74.0 fl (06.02.23 @ 07:15)   Mean Cell Volume: 74.5 fl (06.01.23 @ 07:17)   08-29    141  |  98  |  40<H>  ----------------------------<  116<H>  3.3<L>   |  27  |  1.86<H>    Ca    8.9      29 Aug 2024 06:05  Mg     2.3     08-29      Radiology:             ROS:  No pain, no fever  No lumps in neck or pain  Sob as in HPI  No palpitations   No abdominal pain. No change in bowel habit. No blood in stools  No weakness in extremities  Leg swelling +  Rest of comprehensive ROS was negative    Vital Signs Last 24 Hrs  T(C): 37.1 (29 Aug 2024 04:00), Max: 37.1 (28 Aug 2024 20:35)  T(F): 98.7 (29 Aug 2024 04:00), Max: 98.7 (28 Aug 2024 20:35)  HR: 96 (29 Aug 2024 04:00) (89 - 98)  BP: 106/68 (29 Aug 2024 04:00) (95/59 - 106/68)  BP(mean): --  RR: 17 (29 Aug 2024 04:00) (17 - 18)  SpO2: 92% (29 Aug 2024 04:00) (91% - 95%)    Parameters below as of 29 Aug 2024 04:00  Patient On (Oxygen Delivery Method): room air        Physical Exam:  Patient comfortable  AXOX3  Neck supple, no LN's  Chest: bilateral breath sounds, no wheeze or rales  CVS: regular heart rate without murmur  Abdomen: soft, BS+, no massess or organomegaly  CNS: no gross deficit  Musculoskeletal: +LLE amputation. Chronic venous stasis changes of RLE. +pitting edema to sacrum  Skin: no rash

## 2024-08-29 NOTE — PROGRESS NOTE ADULT - SUBJECTIVE AND OBJECTIVE BOX
DATE OF SERVICE: 08-29-24 @ 13:49    Patient is a 63y old  Male who presents with a chief complaint of Acute on chronic systolic congestive heart failure     (27 Aug 2024 13:55)      INTERVAL HISTORY: Feels ok.     REVIEW OF SYSTEMS:  CONSTITUTIONAL: No weakness  EYES/ENT: No visual changes;  No throat pain   NECK: No pain or stiffness  RESPIRATORY: No cough, wheezing; No shortness of breath  CARDIOVASCULAR: No chest pain or palpitations  GASTROINTESTINAL: No abdominal  pain. No nausea, vomiting, or hematemesis  GENITOURINARY: No dysuria, frequency or hematuria  NEUROLOGICAL: No stroke like symptoms  SKIN: No rashes    TELEMETRY Personally reviewed: SR 80-90 with 8 beats of WCT  	  MEDICATIONS:  buMETAnide IVPB 4 milliGRAM(s) IV Intermittent <User Schedule>  hydrALAZINE 10 milliGRAM(s) Oral three times a day  metoprolol succinate ER 25 milliGRAM(s) Oral daily  sacubitril 24 mG/valsartan 26 mG 1 Tablet(s) Oral every 12 hours  spironolactone 25 milliGRAM(s) Oral daily        PHYSICAL EXAM:  T(C): 36.5 (08-29-24 @ 11:01), Max: 37.1 (08-28-24 @ 20:35)  HR: 95 (08-29-24 @ 11:01) (89 - 98)  BP: 98/64 (08-29-24 @ 11:01) (95/59 - 106/68)  RR: 17 (08-29-24 @ 11:01) (17 - 18)  SpO2: 92% (08-29-24 @ 11:01) (91% - 95%)  Wt(kg): --  I&O's Summary    28 Aug 2024 07:01  -  29 Aug 2024 07:00  --------------------------------------------------------  IN: 600 mL / OUT: 4850 mL / NET: -4250 mL    29 Aug 2024 07:01  -  29 Aug 2024 13:49  --------------------------------------------------------  IN: 0 mL / OUT: 1300 mL / NET: -1300 mL          Appearance: In no distress	  HEENT:    PERRL, EOMI	  Cardiovascular:  S1 S2, No JVD  Respiratory: Lungs clear to auscultation	  Gastrointestinal:  Soft, Non-tender, + BS	  Vascularature:  L BKA  Psychiatric: Appropriate affect   Neuro: no acute focal deficits                               9.6    6.95  )-----------( 158      ( 29 Aug 2024 06:04 )             35.0     08-29    141  |  98  |  40<H>  ----------------------------<  116<H>  3.3<L>   |  27  |  1.86<H>    Ca    8.9      29 Aug 2024 06:05  Mg     2.3     08-29          Labs personally reviewed      ASSESSMENT/PLAN: 	        Problem/Plan - 1:  ·  Problem: Acute decompensated systolic heart failure.   ·  Plan: Continue 4mg iv bumex bid, will uptitrate based on response    - TTE 8/26 similar to prior wiith EF 20%  - GDMT Toprol 25mg daily  - Cont Entresto 24/26mg BID as BP tolerates  - Cont Aldactone 25mg  - Hydralazine 10mg PO TID initiated as per HF  - Farxiga/Jardiance 10mg daily  - Appreciate HF recommendations; Possible CardioMems this admission     Problem/Plan - 2:  ·  Problem: CAD (coronary artery disease).   ·  Plan: c/w asa and Plavix    Problem/Plan - 3:  ·  Problem: Chronic kidney disease, unspecified CKD stage.   ·  Plan: Monitor BMP daily, dose meds per renal fx, avoid nephrotoxins.    Problem/Plan - 4:  ·  Problem: ICD shock   ·  Plan: Inappropriate as per EP'  - settings adjusted  - Possible plan for ICD extraction and re-implant  - Current ICD disabled--> pacing pads on patient    Problem/Plan - 5:  ·  Problem: Prophylactic measure.   ·  Plan: VTE ppx: hep sq       Gertrudefernie Villalobos, AG-NP   Shawn Barnes DO [facility]  Cardiovascular Medicine  [address], [address]  Available through call or text on [employer]  Office: [phone]

## 2024-08-29 NOTE — CONSULT NOTE ADULT - ASSESSMENT
63 year old Male with PMHx HFrEF s/p ICD, HTN, T2DM, s/p LLE amputation in [place] ~2 months ago. Pt presented to ED for ICD shock.        1- CKDIII  2- CHF  3- DM2  4- anemia  5- cellulitis       per chart review, creatinine ranges higher   creatinine is steady at present  creatinine expected to rise and fluid status improves  continue bumex 4 mg iv bid per cards  non oliguric  ICD deactivated, EP following for possible removal and re-implant  check U/A and urine protein/creatinine ratio  anemia, trend hgb  levofloxacin for cellulitis   continue entresto per cards  trend bp  strict I/O  daily standing weight   trend creatinine and electrolytes daily  
63 year old Male with PMHx HFrEF s/p ICD, HTN, T2DM, s/p LLE amputation in [place] ~2 months ago. Pt presented to ED for ICD shock 8/25/24.  and SOB. Being followed by cardiology, nephrology for CKD and podiatry on abx  Seen for anemia  He is more anemic than in 2023. CKD is old.  He is on antiplatelets and chronic inflammation.  Anemia likely multifactorial, rule out def, chronic disease and CKD. will send labs to rule out other unlikely etiologies like hemolysis and paraproteinemia  Will rule out def and replace a needed  Continue rx of infection  If hemoglobin stays significantly below 10 gm/dl with rx of def if needed and inflammation will be a candidate for EPO for CKD anemia  
A/P:63M PMHx HFrEF s/p ICD, HTN, T2DM, s/p LLE amputation   Pt presented w/ ICD shock 30min prior to arrival to ED. Pt reports he's been having progressively worsening GREWAL, orthopnea and SOB for the past few day    Wound Consult requested to assist w/ management of  RLE xerosis    Recommendation   Lac-hydrin BID  elevation   Consider ADI/PVR, Duplex if negative consider compression     Moisturize intact skin w/ SWEEN cream BID  Nutrition Consult for optimization in pt w/ Increased nutritional needs            encourage high quality protein, bakari/ prosource, MVI & Vit C to promote wound healing    Continue turning and positioning w/ offloading assistive devices as per protocol  Buttocks/ Sacrum Kirstie BID and prn soiling        Continue w/ attends under pads and Pericare care as per protocol  Waffle Cushion to chair when oob to chair  Continue w/ low air loss pressure redistribution bed surface Care as per medicine, will follow w/ you/ remain available as requested  If needed upon discharge f/u as outpatient at [facility] [address] [place] [address] [phone]  Seen  and D/w team & RN  Thank you for this consult,  DEEPA [place]-BC, [facility]  [phone]  Nights/ Weekends/ Holidays please call:  General Surgery Consult pager ([phone]) for emergencies  Wound PT for multilayer leg wrapping or VAC issues ([phone]) 
Patient visited at bedside s/p left BKA  patient has obvious venous disease left leg  +DM Neuropathy  LOWER EXTREMITY PHYSICAL EXAM:  BELOW KNEE AMPUTATION LEFT LEG  Vascular: DP/PT 0/4, r/F, CFT <3 seconds R/F, Temperature gradient warm right foot.  Venous insufficeincy right leg  Dried eschar noted dorsal right foot. Ulcerative weeping lesions proximal right leg, venous insufficiency right leg  Neuro: Epicritic sensation Absent to the level of right foot.  Musculoskeletal/Ortho:  Skin:PIGMENTED SCALY DERMATITIS RIGHT LEG WITH XEROTIC SKIN RIGHT FOOT  Thick, mycotic dystrophic discolored nails 1,2,3,4,5 right foot.  Debride all nails 1,2,3,4,5 right foot with sterile nail clipper-no bleeding or interspace maceration noted right foot  Recommend vascular treat right leg venous disease to avoid infection, cellulitis and complications  Apply cavillon to dorsal eschar right foot. No further podiatric care needed  Recommend patient follow up with vascular regarding right leg.
63 y.o. male with hx of ischemic cardiomyopathy with HFrEF 30%, cardiac arrest with prior transvenous ICD extraction for infection and subsequent [employer] S-ICD implant 2020, CKD 3, HTN, T2 DM, COPD, LUIS ALFREDO, and LLE amputee who presented after ICD shock.    Interrogation of S-ICD with inappropriate shock due to oversensing.  Pending S-ICD extraction and EV-ICD reimplantation w/ EP.  HF consulted for decompensated heart failure.    TTE 8/26/24   1. Left ventricular cavity is severely dilated. Left ventricular wall thickness is normal. Left ventricular systolic function is severely decreased with an ejection fraction of 22 % by Bell's method of disks. Regional wall motion abnormalities present.   2. Multiple segmental abnormalities exist. See findings.   3. Normal right ventricular cavity size, with normal wall thickness, and probably normal right ventricular systolic function.   4. Estimated pulmonary artery systolic pressure is 30 mmHg.   5. Trace pericardial effusion.    [facility] 11/18/19  LM:  --  LM: Normal.  LAD:   --  Ostial LAD: There was a 100 % stenosis.  CX:   --  Distal circumflex: There was a 80 % stenosis.  RI:   --  Ramus intermedius: Normal. There was no significant restenosis.  RCA:   --  Mid RCA: There was a 50 % stenosis.  
64 y/o Male with PMHx of HFrEF s/p ICD, HTN, T2DM, s/p LLE amputation, initially presented s/p ICD shock 30mins prior to ED arrival. Pt reports he has been endorsing progressively worsening GREWAL, orthopnea, and SOB for the past few days, now admitted for acute decompensated heart failure. ENT consulted for left epistaxis that occurred s/p fall from inpt bed at around 1:30pm today. Pt states he was laying in the bed when he suddenly developed a severe coughing episode and then found himself on the floor, states he fell on his face. Pt currently on ASA and Plavix for CAD, was previously on subq heparin but now d/carlos s/p fall. On exam, found to have moderate left epistaxis that was controlled with nasopore coated in bacitracin, no further active bleeding. Right nare patent, no bleeding noted. No palpable crepitus or step-offs. + Small left inner lip laceration that had self-resolved, minimal streaking of blood in posterior oropharynx, no active bleeding.

## 2024-08-29 NOTE — PROGRESS NOTE ADULT - SUBJECTIVE AND OBJECTIVE BOX
HPI:  63M PMHx HFrEF s/p ICD, HTN, T2DM, s/p LLE amputation   Pt presented w/ ICD shock 30min prior to arrival to ED. Pt reports he's been having progressively worsening GREWAL, orthopnea and SOB for the past few days.     In the ED, BP on the softer side, otherwise VSS. OK on RA.   CBC w/ wbc 7.2, hgb 9.3, plt 147.   CMP w/ SCr 1.66, LFT unremarkable. Trop 77, 74. proBNP 3255.   CXR w/ RLL opacity, cannot r/o infection.         T(C): 36.5 (08-29-24 @ 11:01), Max: 37.1 (08-29-24 @ 04:00)  HR: 95 (08-29-24 @ 11:01) (95 - 96)  BP: 98/64 (08-29-24 @ 11:01) (98/64 - 106/68)  RR: 17 (08-29-24 @ 11:01) (17 - 17)  SpO2: 92% (08-29-24 @ 11:01) (92% - 92%)    MEDICATIONS  (STANDING):  ALPRAZolam 0.25 milliGRAM(s) Oral once  ammonium lactate 12% Lotion 1 Application(s) Topical every 12 hours  aspirin enteric coated 81 milliGRAM(s) Oral daily  buMETAnide IVPB 4 milliGRAM(s) IV Intermittent <User Schedule>  clopidogrel Tablet 75 milliGRAM(s) Oral daily  dextrose 5%. 1000 milliLiter(s) (50 mL/Hr) IV Continuous <Continuous>  dextrose 5%. 1000 milliLiter(s) (100 mL/Hr) IV Continuous <Continuous>  dextrose 50% Injectable 25 Gram(s) IV Push once  dextrose 50% Injectable 25 Gram(s) IV Push once  dextrose 50% Injectable 12.5 Gram(s) IV Push once  glucagon  Injectable 1 milliGRAM(s) IntraMuscular once  hydrALAZINE 10 milliGRAM(s) Oral three times a day  insulin glargine Injectable (LANTUS) 30 Unit(s) SubCutaneous at bedtime  insulin lispro (ADMELOG) corrective regimen sliding scale   SubCutaneous three times a day before meals  insulin lispro (ADMELOG) corrective regimen sliding scale   SubCutaneous at bedtime  insulin lispro Injectable (ADMELOG) 10 Unit(s) SubCutaneous three times a day before meals  levoFLOXacin  Tablet 500 milliGRAM(s) Oral every 24 hours  metoprolol succinate ER 25 milliGRAM(s) Oral daily  pantoprazole    Tablet 40 milliGRAM(s) Oral before breakfast  rosuvastatin 20 milliGRAM(s) Oral at bedtime  sacubitril 24 mG/valsartan 26 mG 1 Tablet(s) Oral every 12 hours  sodium chloride 0.65% Nasal 2 Spray(s) Both Nostrils four times a day  spironolactone 25 milliGRAM(s) Oral daily  tamsulosin 0.4 milliGRAM(s) Oral at bedtime    MEDICATIONS  (PRN):  acetaminophen     Tablet .. 650 milliGRAM(s) Oral every 6 hours PRN Temp greater or equal to 38C (100.4F), Mild Pain (1 - 3)  aluminum hydroxide/magnesium hydroxide/simethicone Suspension 30 milliLiter(s) Oral every 4 hours PRN Dyspepsia  dextrose Oral Gel 15 Gram(s) Oral once PRN Blood Glucose LESS THAN 70 milliGRAM(s)/deciliter  melatonin 3 milliGRAM(s) Oral at bedtime PRN Insomnia  ondansetron Injectable 4 milliGRAM(s) IV Push every 8 hours PRN Nausea and/or Vomiting  oxyCODONE    IR 5 milliGRAM(s) Oral daily PRN Severe Pain (7 - 10)        Diabetes      AICD (automatic cardioverter/defibrillator) present      Hypertension      Heart failure with reduced ejection fraction      History of ischemic cardiomyopathy      History of COPD      H/O gastroesophageal reflux (GERD)      2019 novel coronavirus disease (COVID-19)      COVID-19 vaccine series completed      H/O vasectomy  20 yrs ago (2000)          Review of Systems:   CONSTITUTIONAL: No fever, weight loss, or fatigue  EYES: No eye pain, visual disturbances, or discharge  ENMT:  No difficulty hearing, tinnitus, vertigo; No sinus or throat pain  NECK: No pain or stiffness  BREASTS: No pain, masses, or nipple discharge  RESPIRATORY: No cough, wheezing, chills or hemoptysis; No shortness of breath  CARDIOVASCULAR: No chest pain, palpitations, dizziness, or leg swelling  GASTROINTESTINAL: No abdominal or epigastric pain. No nausea, vomiting, or hematemesis; No diarrhea or constipation. No melena or hematochezia.  GENITOURINARY: No dysuria, frequency, hematuria, or incontinence  NEUROLOGICAL: No headaches, memory loss, loss of strength, numbness, or tremors  SKIN: No itching, burning, rashes, or lesions   LYMPH NODES: No enlarged glands  ENDOCRINE: No heat or cold intolerance; No hair loss  MUSCULOSKELETAL: No joint pain or swelling; No muscle, back, or extremity pain  PSYCHIATRIC: No depression, anxiety, mood swings, or difficulty sleeping  HEME/LYMPH: No easy bruising, or bleeding gums  ALLERY AND IMMUNOLOGIC: No hives or eczema    Allergies    ceftriaxone (Rash)  doxepin (Other)  Zosyn (Pruritus)  vancomycin (Rash (Mild to Mod))    Intolerances        Social History:     FAMILY HISTORY:  Family history of COPD (chronic obstructive pulmonary disease) (Sibling)    Family history of cardiac disorder  Paternal        PHYSICAL EXAM:  GENERAL: NAD, well-developed  HEAD:  Atraumatic, Normocephalic  EYES: EOMI, PERRLA, conjunctiva and sclera clear  NECK: Supple, No JVD  CHEST/LUNG: Clear to auscultation bilaterally; No wheeze  HEART: Regular rate and rhythm; No murmurs, rubs, or gallops  ABDOMEN: Soft, Nontender, Nondistended; Bowel sounds present  EXTREMITIES:  2+ Peripheral Pulses, No clubbing, cyanosis, or edema  PSYCH: AAOx3  NEUROLOGY: non-focal  SKIN: No rashes or lesions                              9.6    6.95  )-----------( 158      ( 29 Aug 2024 06:04 )             35.0               141|98|40<116  3.3|27|1.86  8.9,2.3,--  08-29 @ 06:05

## 2024-08-29 NOTE — PROGRESS NOTE ADULT - ASSESSMENT
63 year old male with hx of ischemic cardiomyopathy with HFrEF 30%, cardiac arrest with prior transvenous ICD extraction for infection and subsequent [employer] S-ICD implant 2020, CKD 3, HTN, T2 DM, COPD, LUIS ALFREDO, and LLE amputee who presented after ICD shock. S-ICD Interrogation showed an inappropriate ICD shock for device oversensing (see procedure note for full interrogation). Patient denies prior ICD shocks in either devices that he has had.        1. Inappropriate S-ICD shock for device oversensing  2. Acute decompensated heart failure   3. right lower extremity Cellulitis, wound    - ICD remains deactivated for inappropriate device oversensing leading to inappropriate shock likely in setting of new RBBB on EKG which was not present in 2020 EKG.   - Currently being treated for HF exacerbation with IV Bumex and cellulitis with oral antibiotics   - Plan for S-ICD removal and EV-ICD implant once medically stable (likely Tuesday 9/3/24)  - Needs to be optimized from a Heart Failure and Infectious standpoint.  - Please avoid Farxiga/ Jardiance medications in preparation for surgical procedure.   - Continue telemetry monitor    SDina Hernandes NP-C  [phone]

## 2024-08-29 NOTE — PROGRESS NOTE ADULT - SUBJECTIVE AND OBJECTIVE BOX
[facility]   [phone]  RENAL FOLLOW UP NOTE  --------------------------------------------------------------------------------  Chief Complaint:    24 hour events/subjective:    patient seen and examined.   denies sob    PAST HISTORY  --------------------------------------------------------------------------------  No significant changes to PMH, PSH, FHx, SHx, unless otherwise noted    ALLERGIES & MEDICATIONS  --------------------------------------------------------------------------------  Allergies    ceftriaxone (Rash)  doxepin (Other)  Zosyn (Pruritus)  vancomycin (Rash (Mild to Mod))    Intolerances      Standing Inpatient Medications  ALPRAZolam 0.25 milliGRAM(s) Oral once  ammonium lactate 12% Lotion 1 Application(s) Topical every 12 hours  aspirin enteric coated 81 milliGRAM(s) Oral daily  buMETAnide IVPB 4 milliGRAM(s) IV Intermittent <User Schedule>  clopidogrel Tablet 75 milliGRAM(s) Oral daily  dextrose 5%. 1000 milliLiter(s) IV Continuous <Continuous>  dextrose 5%. 1000 milliLiter(s) IV Continuous <Continuous>  dextrose 50% Injectable 12.5 Gram(s) IV Push once  dextrose 50% Injectable 25 Gram(s) IV Push once  dextrose 50% Injectable 25 Gram(s) IV Push once  glucagon  Injectable 1 milliGRAM(s) IntraMuscular once  hydrALAZINE 10 milliGRAM(s) Oral three times a day  insulin glargine Injectable (LANTUS) 30 Unit(s) SubCutaneous at bedtime  insulin lispro (ADMELOG) corrective regimen sliding scale   SubCutaneous three times a day before meals  insulin lispro (ADMELOG) corrective regimen sliding scale   SubCutaneous at bedtime  insulin lispro Injectable (ADMELOG) 10 Unit(s) SubCutaneous three times a day before meals  levoFLOXacin  Tablet 500 milliGRAM(s) Oral every 24 hours  metoprolol succinate ER 25 milliGRAM(s) Oral daily  pantoprazole    Tablet 40 milliGRAM(s) Oral before breakfast  rosuvastatin 20 milliGRAM(s) Oral at bedtime  sacubitril 24 mG/valsartan 26 mG 1 Tablet(s) Oral every 12 hours  sodium chloride 0.65% Nasal 2 Spray(s) Both Nostrils four times a day  spironolactone 25 milliGRAM(s) Oral daily  tamsulosin 0.4 milliGRAM(s) Oral at bedtime    PRN Inpatient Medications  acetaminophen     Tablet .. 650 milliGRAM(s) Oral every 6 hours PRN  aluminum hydroxide/magnesium hydroxide/simethicone Suspension 30 milliLiter(s) Oral every 4 hours PRN  dextrose Oral Gel 15 Gram(s) Oral once PRN  melatonin 3 milliGRAM(s) Oral at bedtime PRN  ondansetron Injectable 4 milliGRAM(s) IV Push every 8 hours PRN  oxyCODONE    IR 5 milliGRAM(s) Oral daily PRN      REVIEW OF SYSTEMS  --------------------------------------------------------------------------------    Gen: denies fevers/chills,  CVS: denies chest pain/palpitations  Resp: denies SOB/Cough  GI: Denies N/V/Abd pain  : Denies dysuria/oliguria/hematuria    VITALS/PHYSICAL EXAM  --------------------------------------------------------------------------------  T(C): 36.5 (08-29-24 @ 11:01), Max: 37.1 (08-28-24 @ 20:35)  HR: 95 (08-29-24 @ 11:01) (89 - 98)  BP: 98/64 (08-29-24 @ 11:01) (95/59 - 106/68)  RR: 17 (08-29-24 @ 11:01) (17 - 18)  SpO2: 92% (08-29-24 @ 11:01) (91% - 95%)  Wt(kg): --        08-28-24 @ 07:01  -  08-29-24 @ 07:00  --------------------------------------------------------  IN: 600 mL / OUT: 4850 mL / NET: -4250 mL    08-29-24 @ 07:01  -  08-29-24 @ 14:46  --------------------------------------------------------  IN: 0 mL / OUT: 1300 mL / NET: -1300 mL      Physical Exam:  	  	Gen: Non toxic comfortable appearing   	Pulm: decrease bs  no rales or ronchi or wheezing  	CV: +JVD, RRR, S1S2; no rub  	Back: No CVA tenderness; no sacral edema  	Abd: +BS, soft, nontender/+distended, +abd wall edema  	: No suprapubic tenderness  	UE: Warm, no cyanosis  no clubbing,  no edema;  	LE: Warm, no cyanosis  no clubbing, RLE chronic venous stasis, +edema, LLE BKA  	Neuro: alert and oriented. speech coherent     LABS/STUDIES  --------------------------------------------------------------------------------              9.6    6.95  >-----------<  158      [08-29-24 @ 06:04]              35.0     141  |  98  |  40  ----------------------------<  116      [08-29-24 @ 06:05]  3.3   |  27  |  1.86        Ca     8.9     [08-29-24 @ 06:05]      Mg     2.3     [08-29-24 @ 06:05]            Creatinine Trend:  SCr 1.86 [08-29 @ 06:05]  SCr 2.01 [08-28 @ 06:33]  SCr 1.87 [08-27 @ 06:38]  SCr 1.90 [08-26 @ 07:15]  SCr 1.67 [08-25 @ 06:50]            Iron 24, TIBC 395, %sat 6      [08-29-24 @ 06:04]  Ferritin 51      [08-29-24 @ 07:31]  HbA1c 8.9      [12-16-19 @ 08:15]

## 2024-08-29 NOTE — PROGRESS NOTE ADULT - SUBJECTIVE AND OBJECTIVE BOX
24H hour events: Pt without complaint, no acute events overnight, Tele: SR/ST 's, occasional VPCs      MEDICATIONS:  aspirin enteric coated 81 milliGRAM(s) Oral daily  buMETAnide IVPB 4 milliGRAM(s) IV Intermittent <User Schedule>  clopidogrel Tablet 75 milliGRAM(s) Oral daily  hydrALAZINE 10 milliGRAM(s) Oral three times a day  metoprolol succinate ER 25 milliGRAM(s) Oral daily  sacubitril 24 mG/valsartan 26 mG 1 Tablet(s) Oral every 12 hours  spironolactone 25 milliGRAM(s) Oral daily  levoFLOXacin  Tablet 500 milliGRAM(s) Oral every 24 hours  acetaminophen     Tablet .. 650 milliGRAM(s) Oral every 6 hours PRN  ALPRAZolam 0.25 milliGRAM(s) Oral once  melatonin 3 milliGRAM(s) Oral at bedtime PRN  ondansetron Injectable 4 milliGRAM(s) IV Push every 8 hours PRN  oxyCODONE    IR 5 milliGRAM(s) Oral daily PRN  aluminum hydroxide/magnesium hydroxide/simethicone Suspension 30 milliLiter(s) Oral every 4 hours PRN  pantoprazole    Tablet 40 milliGRAM(s) Oral before breakfast  dextrose 50% Injectable 25 Gram(s) IV Push once  dextrose 50% Injectable 25 Gram(s) IV Push once  dextrose 50% Injectable 12.5 Gram(s) IV Push once  dextrose Oral Gel 15 Gram(s) Oral once PRN  glucagon  Injectable 1 milliGRAM(s) IntraMuscular once  insulin glargine Injectable (LANTUS) 30 Unit(s) SubCutaneous at bedtime  insulin lispro (ADMELOG) corrective regimen sliding scale   SubCutaneous three times a day before meals  insulin lispro (ADMELOG) corrective regimen sliding scale   SubCutaneous at bedtime  insulin lispro Injectable (ADMELOG) 10 Unit(s) SubCutaneous three times a day before meals  rosuvastatin 20 milliGRAM(s) Oral at bedtime  ammonium lactate 12% Lotion 1 Application(s) Topical every 12 hours  dextrose 5%. 1000 milliLiter(s) IV Continuous <Continuous>  dextrose 5%. 1000 milliLiter(s) IV Continuous <Continuous>  sodium chloride 0.65% Nasal 2 Spray(s) Both Nostrils four times a day  tamsulosin 0.4 milliGRAM(s) Oral at bedtime      REVIEW OF SYSTEMS:  Complete 12 point ROS negative.    PHYSICAL EXAM:  T(C): 37.1 (08-29-24 @ 04:00), Max: 37.1 (08-28-24 @ 20:35)  HR: 96 (08-29-24 @ 04:00) (89 - 98)  BP: 106/68 (08-29-24 @ 04:00) (95/59 - 106/68)  RR: 17 (08-29-24 @ 04:00) (17 - 18)  SpO2: 92% (08-29-24 @ 04:00) (91% - 95%)  Wt(kg): --  I&O's Summary    28 Aug 2024 07:01  -  29 Aug 2024 07:00  --------------------------------------------------------  IN: 600 mL / OUT: 4850 mL / NET: -4250 mL    29 Aug 2024 07:01  -  29 Aug 2024 10:06  --------------------------------------------------------  IN: 0 mL / OUT: 1300 mL / NET: -1300 mL        Appearance: Normal	  HEENT: PERRL, EOMI	  Cardiovascular: Normal S1 S2, No JVD, No murmurs  Respiratory: Lungs clear to auscultation	  Psychiatry: A & O x 3, Mood & affect appropriate  Gastrointestinal: Soft, Non-tender, + BS	  Skin: RLE wound wrapped in gauze, dressing site C/D/I  Neurologic: Grossly intact  Extremities: No clubbing, cyanosis or edema. Left BKA.   Vascular: Peripheral pulses palpable 2+ bilaterally        LABS:	 	    CBC Full  -  ( 29 Aug 2024 06:04 )  WBC Count : 6.95 K/uL  Hemoglobin : 9.6 g/dL  Hematocrit : 35.0 %  Platelet Count - Automated : 158 K/uL  Mean Cell Volume : 71.7 fl  Mean Cell Hemoglobin : 19.7 pg  Mean Cell Hemoglobin Concentration : 27.4 gm/dL  Auto Neutrophil # : x  Auto Lymphocyte # : x  Auto Monocyte # : x  Auto Eosinophil # : x  Auto Basophil # : x  Auto Neutrophil % : x  Auto Lymphocyte % : x  Auto Monocyte % : x  Auto Eosinophil % : x  Auto Basophil % : x    08-29    141  |  98  |  40<H>  ----------------------------<  116<H>  3.3<L>   |  27  |  1.86<H>  08-28    141  |  100  |  38<H>  ----------------------------<  116<H>  3.8   |  28  |  2.01<H>    Ca    8.9      29 Aug 2024 06:05  Ca    8.9      28 Aug 2024 06:33  Mg     2.3     08-29  Mg     2.3     08-28      TELEMETRY: SR/ST 's, occasional VPCs  	      < from: TTE W or WO Ultrasound Enhancing Agent (08.26.24 @ 11:28) >  CONCLUSIONS:      1. Left ventricular cavity is severely dilated. Left ventricular wall thickness is normal. Left ventricular systolic function is severely decreased with an ejection fraction of 22 % by Bell's method of disks. Regional wall motion abnormalities present.   2. Multiple segmental abnormalities exist. See findings.   3. Normal right ventricular cavity size, with normal wall thickness, and probably normal right ventricular systolic function.   4. Estimated pulmonary artery systolic pressure is 30 mmHg.   5. Trace pericardial effusion.    ________________________________________________________________________________________  FINDINGS:     Left Ventricle:  After obtaining consent, Definity ultrasound enhancing agent was given for enhanced left ventricular opacification and improved delineation of the left ventricular endocardial borders. The left ventricular cavity is severely dilated. Left ventricular wall thickness is normal. Left ventricular systolic function is severely decreased with a calculated ejection fraction of 22 % by the Bell's biplane method of disks. There are regional wall motion abnormalities present. Elevated left ventricular filling pressure.  LV Wall Scoring: The entire apex, entire anterior wall, basal and mid anterior  septum, and mid inferolateral segment are akinetic. The basal and mid  anterolateral wall, basal and mid inferior septum, basal and mid inferior wall,  and basal inferolateral segment are hypokinetic.          Right Ventricle:  The right ventricular cavity is normal in size, with normal wall thickness and right ventricular systolic function is probably normal. Tricuspid annular plane systolic excursion (TAPSE) is 2.0 cm (normal >=1.7 cm).     Left Atrium:  The left atrium is normal in size with an indexed volume of 28.34 ml/m².     Right Atrium:  The right atrium is severely dilated with an indexed volume of 42.58 ml/m².     Interatrial Septum:  The interatrial septum appears intact.     Aortic Valve:  Normal aortic valve. There is no aortic valve stenosis.     Mitral Valve:  There is no mitral valve stenosis. There is mild mitral regurgitation.     Tricuspid Valve:  Structurally normal tricuspid valve with normal leaflet excursion. There is massive tricuspid regurgitation. Estimated pulmonary artery systolic pressure is 30 mmHg.     Pulmonic Valve:  Normal pulmonic valve. There is trace pulmonic regurgitation.     Aorta:  The aortic root appears normal in size.     Pericardium:  There is a trace pericardial effusion.     Systemic Veins:  The inferior vena cava is normal in size (normal <2.1cm) with normal inspiratory collapse (normal >50%) consistent with normal right atrial pressure (~3, range 0-5mmHg).  ____________________________________________________________________  QUANTITATIVE DATA:  Left Ventricle Measurements: (Indexed to BSA)     IVSd (2D):   0.9 cm  LVPWd (2D):  1.4 cm  LVIDd (2D):  6.8 cm  LVIDs (2D):  5.7 cm  LV Mass:     357 g  141.9 g/m²  LV Vol d, MOD A2C: 268.0 ml 106.66 ml/m²  LV Vol d, MOD A4C: 246.0 ml 97.90 ml/m²  LV Vol d, MOD BP:  260.0 ml 103.48 ml/m²  LV Vol s, MOD A2C: 209.0 ml 83.18 ml/m²  LV Vol s, MOD A4C: 191.0 ml 76.01 ml/m²  LV Vol s, MOD BP:  202.5 ml 80.58 ml/m²  LVOT SV MOD BP:    57.6 ml  LV EF% MOD BP:     22 %     MV E Vmax:    0.85 m/s  MV A Vmax:    0.56 m/s  MV E/A:       1.53  e' lateral:   5.66 cm/s  e' medial:    5.44 cm/s  E/e' lateral: 15.07  E/e' medial:  15.68  E/e' Average: 15.37    Aorta Measurements: (Normal range) (Indexed to BSA)     Ao Root d     3.30 cm (3.1 - 3.7 cm) 1.31 cm/m²  Ao Annulus:   2.1 cm (2.3 - 2.9 cm)  Ao Asc d, 2D: 3.60  Ao Asc prox:  3.50 cm                1.39 cm/m²            Left Atrium Measurements: (Indexed to BSA)  LA Diam 2D:        4.80 cm  LA Vol s, MOD A4C: 76.70 ml.  LA Vol s, MOD A2C: 66.20 ml.  LA Vol s, MOD BP:  71.20 ml  28.34 ml/m²         Right Ventricle Measurements: Right Atrial Measurements:     TAPSE:      2.0 cm            RA Vol:       107.00 ml  RV S' Vmax: 9.57 cm/s         RA Vol Index: 42.58 ml/m²       LVOT / RVOT/ Qp/Qs Data: (Indexed to BSA)  LVOT Diameter:  2.10 cm  LVOT Area:      3.46 cm²  LVOT Vmax:      0.55 m/s  LVOT Vmn:       0.351 m/s  LVOT VTI:       10.50 cm  LVOT peak grad: 1 mmHg  LVOT mean grad: 1.0 mmHg  LVOT SV:        36.4 ml   14.47 ml/m²    Mitral Valve Measurements:     MV E Vmax: 0.9 m/s  MV A Vmax: 0.6 m/s  MV E/A:    1.5       Tricuspid Valve Measurements:     TR Vmean:          2.0 m/s  TR Vmax:           2.6 m/s  TR Peak Gradient:  26.6 mmHg  RA Pressure:       3 mmHg  PASP:              30 mmHg  TR PISA Radius:    0.80 cm  TR Aliasing Larry:   0.38 m/s  TR Inst Flow Rate: 154.8 ml/s  TR VTI:            85.80 cm  TR Eff KATHY:        0.60 cm²  TR Reg Volume:     51.49 ml    < end of copied text >

## 2024-08-30 LAB
ANION GAP SERPL CALC-SCNC: 15 MMOL/L — SIGNIFICANT CHANGE UP (ref 5–17)
ANION GAP SERPL CALC-SCNC: 16 MMOL/L — SIGNIFICANT CHANGE UP (ref 5–17)
BUN SERPL-MCNC: 44 MG/DL — HIGH (ref 7–23)
BUN SERPL-MCNC: 45 MG/DL — HIGH (ref 7–23)
CALCIUM SERPL-MCNC: 8.7 MG/DL — SIGNIFICANT CHANGE UP (ref 8.4–10.5)
CALCIUM SERPL-MCNC: 9.3 MG/DL — SIGNIFICANT CHANGE UP (ref 8.4–10.5)
CHLORIDE SERPL-SCNC: 100 MMOL/L — SIGNIFICANT CHANGE UP (ref 96–108)
CHLORIDE SERPL-SCNC: 99 MMOL/L — SIGNIFICANT CHANGE UP (ref 96–108)
CO2 SERPL-SCNC: 26 MMOL/L — SIGNIFICANT CHANGE UP (ref 22–31)
CO2 SERPL-SCNC: 27 MMOL/L — SIGNIFICANT CHANGE UP (ref 22–31)
CREAT SERPL-MCNC: 1.76 MG/DL — HIGH (ref 0.5–1.3)
CREAT SERPL-MCNC: 1.92 MG/DL — HIGH (ref 0.5–1.3)
EGFR: 39 ML/MIN/1.73M2 — LOW
EGFR: 43 ML/MIN/1.73M2 — LOW
GLUCOSE BLDC GLUCOMTR-MCNC: 114 MG/DL — HIGH (ref 70–99)
GLUCOSE BLDC GLUCOMTR-MCNC: 142 MG/DL — HIGH (ref 70–99)
GLUCOSE BLDC GLUCOMTR-MCNC: 149 MG/DL — HIGH (ref 70–99)
GLUCOSE BLDC GLUCOMTR-MCNC: 171 MG/DL — HIGH (ref 70–99)
GLUCOSE SERPL-MCNC: 110 MG/DL — HIGH (ref 70–99)
GLUCOSE SERPL-MCNC: 154 MG/DL — HIGH (ref 70–99)
HAPTOGLOB SERPL-MCNC: 243 MG/DL — HIGH (ref 34–200)
IGA FLD-MCNC: 395 MG/DL — SIGNIFICANT CHANGE UP (ref 84–499)
IGG FLD-MCNC: 1102 MG/DL — SIGNIFICANT CHANGE UP (ref 610–1660)
IGM SERPL-MCNC: 144 MG/DL — SIGNIFICANT CHANGE UP (ref 35–242)
KAPPA LC SER QL IFE: 9.4 MG/DL — HIGH (ref 0.33–1.94)
KAPPA/LAMBDA FREE LIGHT CHAIN RATIO, SERUM: 1.16 RATIO — SIGNIFICANT CHANGE UP (ref 0.26–1.65)
LAMBDA LC SER QL IFE: 8.09 MG/DL — HIGH (ref 0.57–2.63)
MAGNESIUM SERPL-MCNC: 2.2 MG/DL — SIGNIFICANT CHANGE UP (ref 1.6–2.6)
MAGNESIUM SERPL-MCNC: 2.3 MG/DL — SIGNIFICANT CHANGE UP (ref 1.6–2.6)
POTASSIUM SERPL-MCNC: 3.8 MMOL/L — SIGNIFICANT CHANGE UP (ref 3.5–5.3)
POTASSIUM SERPL-MCNC: 3.9 MMOL/L — SIGNIFICANT CHANGE UP (ref 3.5–5.3)
POTASSIUM SERPL-SCNC: 3.8 MMOL/L — SIGNIFICANT CHANGE UP (ref 3.5–5.3)
POTASSIUM SERPL-SCNC: 3.9 MMOL/L — SIGNIFICANT CHANGE UP (ref 3.5–5.3)
RBC # BLD: 4.7 M/UL — SIGNIFICANT CHANGE UP (ref 4.2–5.8)
RETICS #: 37.6 K/UL — SIGNIFICANT CHANGE UP (ref 25–125)
RETICS/RBC NFR: 0.8 % — SIGNIFICANT CHANGE UP (ref 0.5–2.5)
SODIUM SERPL-SCNC: 141 MMOL/L — SIGNIFICANT CHANGE UP (ref 135–145)
SODIUM SERPL-SCNC: 142 MMOL/L — SIGNIFICANT CHANGE UP (ref 135–145)
VIT B12 SERPL-MCNC: 435 PG/ML — SIGNIFICANT CHANGE UP (ref 232–1245)

## 2024-08-30 PROCEDURE — 99233 SBSQ HOSP IP/OBS HIGH 50: CPT | Mod: GC

## 2024-08-30 RX ORDER — IRON SUCROSE 20 MG/ML
200 INJECTION, SOLUTION INTRAVENOUS EVERY 24 HOURS
Refills: 0 | Status: DISCONTINUED | OUTPATIENT
Start: 2024-08-30 | End: 2024-08-30

## 2024-08-30 RX ORDER — BUMETANIDE 2 MG/1
12 TABLET ORAL
Refills: 0 | Status: DISCONTINUED | OUTPATIENT
Start: 2024-08-30 | End: 2024-09-03

## 2024-08-30 RX ORDER — BUMETANIDE 2 MG/1
1 TABLET ORAL
Qty: 20 | Refills: 0 | Status: DISCONTINUED | OUTPATIENT
Start: 2024-08-30 | End: 2024-08-30

## 2024-08-30 RX ORDER — HYDRALAZINE HCL 50 MG
25 TABLET ORAL THREE TIMES A DAY
Refills: 0 | Status: DISCONTINUED | OUTPATIENT
Start: 2024-08-30 | End: 2024-09-03

## 2024-08-30 RX ORDER — BUMETANIDE 2 MG/1
4 TABLET ORAL
Refills: 0 | Status: DISCONTINUED | OUTPATIENT
Start: 2024-08-31 | End: 2024-09-03

## 2024-08-30 RX ORDER — IRON SUCROSE 20 MG/ML
200 INJECTION, SOLUTION INTRAVENOUS EVERY 24 HOURS
Refills: 0 | Status: COMPLETED | OUTPATIENT
Start: 2024-08-30 | End: 2024-09-01

## 2024-08-30 RX ADMIN — Medication 1 APPLICATION(S): at 05:09

## 2024-08-30 RX ADMIN — OXYCODONE HYDROCHLORIDE 5 MILLIGRAM(S): 5 TABLET ORAL at 21:54

## 2024-08-30 RX ADMIN — Medication 1 APPLICATION(S): at 17:19

## 2024-08-30 RX ADMIN — SPIRONOLACTONE 25 MILLIGRAM(S): 25 TABLET, FILM COATED ORAL at 05:09

## 2024-08-30 RX ADMIN — ROSUVASTATIN CALCIUM 20 MILLIGRAM(S): 10 TABLET ORAL at 21:54

## 2024-08-30 RX ADMIN — INSULIN GLARGINE 30 UNIT(S): 100 INJECTION, SOLUTION SUBCUTANEOUS at 21:54

## 2024-08-30 RX ADMIN — SACUBITRIL AND VALSARTAN 1 TABLET(S): 49; 51 TABLET, FILM COATED ORAL at 17:21

## 2024-08-30 RX ADMIN — Medication 75 MILLIGRAM(S): at 11:05

## 2024-08-30 RX ADMIN — Medication 40 MILLIGRAM(S): at 05:08

## 2024-08-30 RX ADMIN — Medication 81 MILLIGRAM(S): at 11:05

## 2024-08-30 RX ADMIN — BUMETANIDE 132 MILLIGRAM(S): 2 TABLET ORAL at 05:06

## 2024-08-30 RX ADMIN — Medication 10 UNIT(S): at 11:37

## 2024-08-30 RX ADMIN — SACUBITRIL AND VALSARTAN 1 TABLET(S): 49; 51 TABLET, FILM COATED ORAL at 05:08

## 2024-08-30 RX ADMIN — Medication 10 UNIT(S): at 17:17

## 2024-08-30 RX ADMIN — METOPROLOL TARTRATE 25 MILLIGRAM(S): 100 TABLET ORAL at 05:09

## 2024-08-30 RX ADMIN — Medication 10 UNIT(S): at 07:42

## 2024-08-30 RX ADMIN — OXYCODONE HYDROCHLORIDE 5 MILLIGRAM(S): 5 TABLET ORAL at 22:54

## 2024-08-30 RX ADMIN — IRON SUCROSE 146.67 MILLIGRAM(S): 20 INJECTION, SOLUTION INTRAVENOUS at 23:50

## 2024-08-30 RX ADMIN — Medication 1: at 07:41

## 2024-08-30 RX ADMIN — BUMETANIDE 9 MILLIGRAM(S): 2 TABLET ORAL at 14:50

## 2024-08-30 RX ADMIN — Medication 10 MILLIGRAM(S): at 05:08

## 2024-08-30 RX ADMIN — TAMSULOSIN HYDROCHLORIDE 0.4 MILLIGRAM(S): 0.4 CAPSULE ORAL at 21:54

## 2024-08-30 NOTE — PROGRESS NOTE ADULT - ASSESSMENT
63 y.o. male with hx of ischemic cardiomyopathy with HFrEF 30%, cardiac arrest with prior transvenous ICD extraction for infection and subsequent [employer] S-ICD implant 2020, CKD 3, HTN, T2 DM, COPD, LUIS ALFREDO, and LLE amputee who presented after ICD shock.    Interrogation of S-ICD with inappropriate shock due to oversensing.  Pending S-ICD extraction and EV-ICD reimplantation w/ EP.  HF consulted for decompensated heart failure.    TTE 8/26/24   1. Left ventricular cavity is severely dilated. Left ventricular wall thickness is normal. Left ventricular systolic function is severely decreased with an ejection fraction of 22 % by Bell's method of disks. Regional wall motion abnormalities present.   2. Multiple segmental abnormalities exist. See findings.   3. Normal right ventricular cavity size, with normal wall thickness, and probably normal right ventricular systolic function.   4. Estimated pulmonary artery systolic pressure is 30 mmHg.   5. Trace pericardial effusion.    [facility] 11/18/19  LM:  --  LM: Normal.  LAD:   --  Ostial LAD: There was a 100 % stenosis.  CX:   --  Distal circumflex: There was a 80 % stenosis.  RI:   --  Ramus intermedius: Normal. There was no significant restenosis.  RCA:   --  Mid RCA: There was a 50 % stenosis.

## 2024-08-30 NOTE — PROGRESS NOTE ADULT - SUBJECTIVE AND OBJECTIVE BOX
[facility]   [phone]  RENAL FOLLOW UP NOTE  --------------------------------------------------------------------------------  Chief Complaint:    24 hour events/subjective:    patient seen an examined.   denies sob    PAST HISTORY  --------------------------------------------------------------------------------  No significant changes to PMH, PSH, FHx, SHx, unless otherwise noted    ALLERGIES & MEDICATIONS  --------------------------------------------------------------------------------  Allergies    ceftriaxone (Rash)  doxepin (Other)  Zosyn (Pruritus)  vancomycin (Rash (Mild to Mod))    Intolerances      Standing Inpatient Medications  ALPRAZolam 0.25 milliGRAM(s) Oral once  ammonium lactate 12% Lotion 1 Application(s) Topical every 12 hours  aspirin enteric coated 81 milliGRAM(s) Oral daily  buMETAnide Infusion 1 mG/Hr IV Continuous <Continuous>  clopidogrel Tablet 75 milliGRAM(s) Oral daily  dextrose 5%. 1000 milliLiter(s) IV Continuous <Continuous>  dextrose 5%. 1000 milliLiter(s) IV Continuous <Continuous>  dextrose 50% Injectable 25 Gram(s) IV Push once  dextrose 50% Injectable 25 Gram(s) IV Push once  dextrose 50% Injectable 12.5 Gram(s) IV Push once  glucagon  Injectable 1 milliGRAM(s) IntraMuscular once  hydrALAZINE 25 milliGRAM(s) Oral three times a day  insulin glargine Injectable (LANTUS) 30 Unit(s) SubCutaneous at bedtime  insulin lispro (ADMELOG) corrective regimen sliding scale   SubCutaneous three times a day before meals  insulin lispro (ADMELOG) corrective regimen sliding scale   SubCutaneous at bedtime  insulin lispro Injectable (ADMELOG) 10 Unit(s) SubCutaneous three times a day before meals  levoFLOXacin  Tablet 500 milliGRAM(s) Oral every 24 hours  metoprolol succinate ER 25 milliGRAM(s) Oral daily  pantoprazole    Tablet 40 milliGRAM(s) Oral before breakfast  rosuvastatin 20 milliGRAM(s) Oral at bedtime  sacubitril 24 mG/valsartan 26 mG 1 Tablet(s) Oral every 12 hours  sodium chloride 0.65% Nasal 2 Spray(s) Both Nostrils four times a day  spironolactone 25 milliGRAM(s) Oral daily  tamsulosin 0.4 milliGRAM(s) Oral at bedtime    PRN Inpatient Medications  acetaminophen     Tablet .. 650 milliGRAM(s) Oral every 6 hours PRN  aluminum hydroxide/magnesium hydroxide/simethicone Suspension 30 milliLiter(s) Oral every 4 hours PRN  dextrose Oral Gel 15 Gram(s) Oral once PRN  melatonin 3 milliGRAM(s) Oral at bedtime PRN  ondansetron Injectable 4 milliGRAM(s) IV Push every 8 hours PRN  oxyCODONE    IR 5 milliGRAM(s) Oral daily PRN      REVIEW OF SYSTEMS  --------------------------------------------------------------------------------    Gen: denies fevers/chills,  CVS: denies chest pain/palpitations  Resp: denies SOB/Cough  GI: Denies N/V/Abd pain  : Denies dysuria    VITALS/PHYSICAL EXAM  --------------------------------------------------------------------------------  T(C): 36.6 (08-30-24 @ 11:14), Max: 36.9 (08-30-24 @ 04:21)  HR: 95 (08-30-24 @ 13:15) (95 - 99)  BP: 95/62 (08-30-24 @ 13:15) (95/62 - 111/73)  RR: 18 (08-30-24 @ 13:15) (18 - 18)  SpO2: 96% (08-30-24 @ 13:15) (94% - 96%)  Wt(kg): --        08-29-24 @ 07:01  -  08-30-24 @ 07:00  --------------------------------------------------------  IN: 0 mL / OUT: 5350 mL / NET: -5350 mL    08-30-24 @ 07:01  -  08-30-24 @ 14:04  --------------------------------------------------------  IN: 600 mL / OUT: 1100 mL / NET: -500 mL      Physical Exam:  	  	Gen: Non toxic comfortable appearing   	Pulm: decrease bs  no rales or ronchi or wheezing  	CV: +mild JVD, RRR, S1S2; no rub  	Back: No CVA tenderness; no sacral edema  	Abd: +BS, soft, nontender/+distended, +abd wall edema  	: No suprapubic tenderness  	UE: Warm, no cyanosis  no clubbing,  no edema;  	LE: Warm, no cyanosis  no clubbing, RLE chronic venous stasis, +edema, LLE BKA  	Neuro: alert and oriented. speech coherent     LABS/STUDIES  --------------------------------------------------------------------------------              9.6    6.95  >-----------<  158      [08-29-24 @ 06:04]              35.0     142  |  100  |  44  ----------------------------<  154      [08-30-24 @ 06:49]  3.9   |  26  |  1.76        Ca     8.7     [08-30-24 @ 06:49]      Mg     2.2     [08-30-24 @ 06:49]            Creatinine Trend:  SCr 1.76 [08-30 @ 06:49]  SCr 1.86 [08-29 @ 06:05]  SCr 2.01 [08-28 @ 06:33]  SCr 1.87 [08-27 @ 06:38]  SCr 1.90 [08-26 @ 07:15]            Iron 24, TIBC 395, %sat 6      [08-29-24 @ 06:04]  Ferritin 51      [08-29-24 @ 07:31]  HbA1c 8.9      [12-16-19 @ 08:15]    Free Light Chains: kappa 9.40, lambda 8.09, ratio = 1.16      [08-30 @ 06:49]

## 2024-08-30 NOTE — PROGRESS NOTE ADULT - ASSESSMENT
63 year old Male with PMHx HFrEF s/p ICD, HTN, T2DM, s/p LLE amputation in [place] ~2 months ago. Pt presented to ED for ICD shock.        1- CKDIII  2- CHF  3- DM2  4- anemia  5- cellulitis       per chart review, creatinine ranges higher   creatinine is steady  creatinine expected to rise as fluid status improves  transitioning to bumex drip during day time as per heart failure recs  aldactone 25 mg daily for CHF  non oliguric  ICD deactivated, EP following for possible removal and re-implant tuesday  anemia, trend hgb  levofloxacin 500 mg daily for cellulitis   continue entresto 24/46 mg bid   trend bp  strict I/O  daily standing weight   trend creatinine and electrolytes daily

## 2024-08-30 NOTE — PROGRESS NOTE ADULT - ASSESSMENT
63 year old male with hx of ischemic cardiomyopathy with HFrEF 30%, cardiac arrest with prior transvenous ICD extraction for infection and subsequent [employer] S-ICD implant 2020, CKD 3, HTN, T2 DM, COPD, LUIS ALFREDO, and LLE amputee who presented after ICD shock. S-ICD Interrogation showed an inappropriate ICD shock for device oversensing (see procedure note for full interrogation). Patient denies prior ICD shocks in either devices that he has had.        1. Inappropriate S-ICD shock for device oversensing  2. Acute decompensated heart failure   3. right lower extremity Cellulitis, wound    - ICD remains deactivated for inappropriate device oversensing leading to inappropriate shock likely in setting of new RBBB on EKG which was not present in 2020 EKG.   - Currently being treated for HF exacerbation with IV Bumex and cellulitis with oral antibiotics   - Plan for S-ICD removal and EV-ICD implant by Dr. Rodríguez (tentatively scheduled for Tuesday 9/3/24 in the OR)  - Needs to be optimized from a Heart Failure and Infectious standpoint.  - Please avoid Farxiga/ Jardiance medications in preparation for surgical procedure.   - Continue telemetry monitor    DANIEL Hernandes NP-C  [phone]

## 2024-08-30 NOTE — PROGRESS NOTE ADULT - SUBJECTIVE AND OBJECTIVE BOX
Heart FailureProgress Note  ------------------------------------------------------------------------------------------  SUBJECTIVE:   - Tele: NSR, no events  - No events overnight. Denies CP, SOB or Palpitations.   -------------------------------------------------------------------------------------------    VS:  T(F): 97.9 (08-30), Max: 98.4 (08-30)  HR: 96 (08-30) (96 - 99)  BP: 99/66 (08-30) (99/66 - 111/73)  RR: 18 (08-30)  SpO2: 95% (08-30)  I&O's Summary    29 Aug 2024 07:01  -  30 Aug 2024 07:00  --------------------------------------------------------  IN: 0 mL / OUT: 5350 mL / NET: -5350 mL    30 Aug 2024 07:01  -  30 Aug 2024 13:05  --------------------------------------------------------  IN: 600 mL / OUT: 1100 mL / NET: -500 mL      PHYSICAL EXAM:  GENERAL: NAD  HEAD: Atraumatic, Normocephalic.  EYES: EOMI, PERRLA, conjunctiva and sclera clear.  ENT: Moist mucous membranes.  NECK: Supple, + JVD.  CHEST/LUNG: Diminished breath sounds at bases bilaterally  HEART: Regular rate and rhythm; No murmurs, rubs, or gallops.  ABDOMEN: Bowel sounds present; Mild abdominal distension  EXTREMITIES:  +LLE amputation. Chronic venous stasis changes of RLE. +pitting edema to sacrum  PSYCH: Normal affect.  SKIN: No rashes or lesions.  -------------------------------------------------------------------------------------------  LABS:                          9.6    6.95  )-----------( 158      ( 29 Aug 2024 06:04 )             35.0     08-30    142  |  100  |  44<H>  ----------------------------<  154<H>  3.9   |  26  |  1.76<H>    Ca    8.7      30 Aug 2024 06:49  Mg     2.2     08-30        CARDIAC MARKERS ( 24 Aug 2024 21:33 )  74 ng/L / x     / x     / x     / x     / x      CARDIAC MARKERS ( 24 Aug 2024 20:03 )  77 ng/L / x     / x     / x     / x     / x                -------------------------------------------------------------------------------------------  Meds:  acetaminophen     Tablet .. 650 milliGRAM(s) Oral every 6 hours PRN  ALPRAZolam 0.25 milliGRAM(s) Oral once  aluminum hydroxide/magnesium hydroxide/simethicone Suspension 30 milliLiter(s) Oral every 4 hours PRN  ammonium lactate 12% Lotion 1 Application(s) Topical every 12 hours  aspirin enteric coated 81 milliGRAM(s) Oral daily  buMETAnide IVPB 4 milliGRAM(s) IV Intermittent <User Schedule>  clopidogrel Tablet 75 milliGRAM(s) Oral daily  dextrose 5%. 1000 milliLiter(s) IV Continuous <Continuous>  dextrose 5%. 1000 milliLiter(s) IV Continuous <Continuous>  dextrose 50% Injectable 25 Gram(s) IV Push once  dextrose 50% Injectable 25 Gram(s) IV Push once  dextrose 50% Injectable 12.5 Gram(s) IV Push once  dextrose Oral Gel 15 Gram(s) Oral once PRN  glucagon  Injectable 1 milliGRAM(s) IntraMuscular once  hydrALAZINE 10 milliGRAM(s) Oral three times a day  insulin glargine Injectable (LANTUS) 30 Unit(s) SubCutaneous at bedtime  insulin lispro (ADMELOG) corrective regimen sliding scale   SubCutaneous three times a day before meals  insulin lispro (ADMELOG) corrective regimen sliding scale   SubCutaneous at bedtime  insulin lispro Injectable (ADMELOG) 10 Unit(s) SubCutaneous three times a day before meals  levoFLOXacin  Tablet 500 milliGRAM(s) Oral every 24 hours  melatonin 3 milliGRAM(s) Oral at bedtime PRN  metoprolol succinate ER 25 milliGRAM(s) Oral daily  ondansetron Injectable 4 milliGRAM(s) IV Push every 8 hours PRN  oxyCODONE    IR 5 milliGRAM(s) Oral daily PRN  pantoprazole    Tablet 40 milliGRAM(s) Oral before breakfast  rosuvastatin 20 milliGRAM(s) Oral at bedtime  sacubitril 24 mG/valsartan 26 mG 1 Tablet(s) Oral every 12 hours  sodium chloride 0.65% Nasal 2 Spray(s) Both Nostrils four times a day  spironolactone 25 milliGRAM(s) Oral daily  tamsulosin 0.4 milliGRAM(s) Oral at bedtime    -------------------------------------------------------------------------------------------

## 2024-08-30 NOTE — PROGRESS NOTE ADULT - PROBLEM SELECTOR PLAN 4
ICD w/ inappropriate shock  Pending extraction and EV-ICD reimplantation once optimized- still requires significant diuresis prior  EP following.

## 2024-08-30 NOTE — PROGRESS NOTE ADULT - SUBJECTIVE AND OBJECTIVE BOX
HPI:  63M PMHx HFrEF s/p ICD, HTN, T2DM, s/p LLE amputation   Pt presented w/ ICD shock 30min prior to arrival to ED. Pt reports he's been having progressively worsening GREWAL, orthopnea and SOB for the past few days.     In the ED, BP on the softer side, otherwise VSS. OK on RA.   CBC w/ wbc 7.2, hgb 9.3, plt 147.   CMP w/ SCr 1.66, LFT unremarkable. Trop 77, 74. proBNP 3255.   CXR w/ RLL opacity, cannot r/o infection.       T(C): 36.7 (08-30-24 @ 20:49), Max: 36.7 (08-30-24 @ 20:49)  HR: 95 (08-30-24 @ 20:49) (95 - 97)  BP: 99/62 (08-30-24 @ 20:49) (94/58 - 99/62)  RR: 18 (08-30-24 @ 20:49) (18 - 18)  SpO2: 94% (08-30-24 @ 20:49) (94% - 96%)      MEDICATIONS  (STANDING):  ALPRAZolam 0.25 milliGRAM(s) Oral once  ammonium lactate 12% Lotion 1 Application(s) Topical every 12 hours  aspirin enteric coated 81 milliGRAM(s) Oral daily  buMETAnide IVPB 12 milliGRAM(s) IV Intermittent <User Schedule>  clopidogrel Tablet 75 milliGRAM(s) Oral daily  dextrose 5%. 1000 milliLiter(s) (100 mL/Hr) IV Continuous <Continuous>  dextrose 5%. 1000 milliLiter(s) (50 mL/Hr) IV Continuous <Continuous>  dextrose 50% Injectable 25 Gram(s) IV Push once  dextrose 50% Injectable 12.5 Gram(s) IV Push once  dextrose 50% Injectable 25 Gram(s) IV Push once  glucagon  Injectable 1 milliGRAM(s) IntraMuscular once  hydrALAZINE 25 milliGRAM(s) Oral three times a day  insulin glargine Injectable (LANTUS) 30 Unit(s) SubCutaneous at bedtime  insulin lispro (ADMELOG) corrective regimen sliding scale   SubCutaneous three times a day before meals  insulin lispro (ADMELOG) corrective regimen sliding scale   SubCutaneous at bedtime  insulin lispro Injectable (ADMELOG) 10 Unit(s) SubCutaneous three times a day before meals  iron sucrose IVPB 200 milliGRAM(s) IV Intermittent every 24 hours  levoFLOXacin  Tablet 500 milliGRAM(s) Oral every 24 hours  metoprolol succinate ER 25 milliGRAM(s) Oral daily  pantoprazole    Tablet 40 milliGRAM(s) Oral before breakfast  rosuvastatin 20 milliGRAM(s) Oral at bedtime  sacubitril 24 mG/valsartan 26 mG 1 Tablet(s) Oral every 12 hours  sodium chloride 0.65% Nasal 2 Spray(s) Both Nostrils four times a day  spironolactone 25 milliGRAM(s) Oral daily  tamsulosin 0.4 milliGRAM(s) Oral at bedtime    MEDICATIONS  (PRN):  acetaminophen     Tablet .. 650 milliGRAM(s) Oral every 6 hours PRN Temp greater or equal to 38C (100.4F), Mild Pain (1 - 3)  aluminum hydroxide/magnesium hydroxide/simethicone Suspension 30 milliLiter(s) Oral every 4 hours PRN Dyspepsia  dextrose Oral Gel 15 Gram(s) Oral once PRN Blood Glucose LESS THAN 70 milliGRAM(s)/deciliter  melatonin 3 milliGRAM(s) Oral at bedtime PRN Insomnia  ondansetron Injectable 4 milliGRAM(s) IV Push every 8 hours PRN Nausea and/or Vomiting  oxyCODONE    IR 5 milliGRAM(s) Oral daily PRN Severe Pain (7 - 10)  Diabetes      AICD (automatic cardioverter/defibrillator) present      Hypertension      Heart failure with reduced ejection fraction      History of ischemic cardiomyopathy      History of COPD      H/O gastroesophageal reflux (GERD)      2019 novel coronavirus disease (COVID-19)      COVID-19 vaccine series completed      H/O vasectomy  20 yrs ago (2000)          Review of Systems:   CONSTITUTIONAL: No fever, weight loss, or fatigue  EYES: No eye pain, visual disturbances, or discharge  ENMT:  No difficulty hearing, tinnitus, vertigo; No sinus or throat pain  NECK: No pain or stiffness  BREASTS: No pain, masses, or nipple discharge  RESPIRATORY: No cough, wheezing, chills or hemoptysis; No shortness of breath  CARDIOVASCULAR: No chest pain, palpitations, dizziness, or leg swelling  GASTROINTESTINAL: No abdominal or epigastric pain. No nausea, vomiting, or hematemesis; No diarrhea or constipation. No melena or hematochezia.  GENITOURINARY: No dysuria, frequency, hematuria, or incontinence  NEUROLOGICAL: No headaches, memory loss, loss of strength, numbness, or tremors  SKIN: No itching, burning, rashes, or lesions   LYMPH NODES: No enlarged glands  ENDOCRINE: No heat or cold intolerance; No hair loss  MUSCULOSKELETAL: No joint pain or swelling; No muscle, back, or extremity pain  PSYCHIATRIC: No depression, anxiety, mood swings, or difficulty sleeping  HEME/LYMPH: No easy bruising, or bleeding gums  ALLERY AND IMMUNOLOGIC: No hives or eczema    Allergies    ceftriaxone (Rash)  doxepin (Other)  Zosyn (Pruritus)  vancomycin (Rash (Mild to Mod))    Intolerances        Social History:     FAMILY HISTORY:  Family history of COPD (chronic obstructive pulmonary disease) (Sibling)    Family history of cardiac disorder  Paternal        PHYSICAL EXAM:  GENERAL: NAD, well-developed  HEAD:  Atraumatic, Normocephalic  EYES: EOMI, PERRLA, conjunctiva and sclera clear  NECK: Supple, No JVD  CHEST/LUNG: Clear to auscultation bilaterally; No wheeze  HEART: Regular rate and rhythm; No murmurs, rubs, or gallops  ABDOMEN: Soft, Nontender, Nondistended; Bowel sounds present  EXTREMITIES:  2+ Peripheral Pulses, No clubbing, cyanosis, or edema  PSYCH: AAOx3  NEUROLOGY: non-focal  SKIN: No rashes or lesions                              9.6    6.95  )-----------( 158      ( 29 Aug 2024 06:04 )             35.0               141|98|40<116  3.3|27|1.86  8.9,2.3,--  08-29 @ 06:05

## 2024-08-30 NOTE — PROGRESS NOTE ADULT - SUBJECTIVE AND OBJECTIVE BOX
DATE OF SERVICE: 08-30-24 @ 11:54    Patient is a 63y old  Male who presents with a chief complaint of Acute on chronic systolic congestive heart failure     (27 Aug 2024 13:55)      INTERVAL HISTORY: Feels ok.     REVIEW OF SYSTEMS:  CONSTITUTIONAL: No weakness  EYES/ENT: No visual changes;  No throat pain   NECK: No pain or stiffness  RESPIRATORY: No cough, wheezing; No shortness of breath  CARDIOVASCULAR: No chest pain or palpitations  GASTROINTESTINAL: No abdominal  pain. No nausea, vomiting, or hematemesis  GENITOURINARY: No dysuria, frequency or hematuria  NEUROLOGICAL: No stroke like symptoms  SKIN: No rashes    TELEMETRY Personally reviewed: SR/ST 1st AVB   	  MEDICATIONS:  buMETAnide IVPB 4 milliGRAM(s) IV Intermittent <User Schedule>  hydrALAZINE 10 milliGRAM(s) Oral three times a day  metoprolol succinate ER 25 milliGRAM(s) Oral daily  sacubitril 24 mG/valsartan 26 mG 1 Tablet(s) Oral every 12 hours  spironolactone 25 milliGRAM(s) Oral daily        PHYSICAL EXAM:  T(C): 36.6 (08-30-24 @ 11:14), Max: 36.9 (08-30-24 @ 04:21)  HR: 96 (08-30-24 @ 11:14) (96 - 99)  BP: 99/66 (08-30-24 @ 11:14) (99/66 - 111/73)  RR: 18 (08-30-24 @ 11:14) (18 - 18)  SpO2: 95% (08-30-24 @ 11:14) (94% - 96%)  Wt(kg): --  I&O's Summary    29 Aug 2024 07:01  -  30 Aug 2024 07:00  --------------------------------------------------------  IN: 0 mL / OUT: 5350 mL / NET: -5350 mL    30 Aug 2024 07:01  -  30 Aug 2024 11:54  --------------------------------------------------------  IN: 600 mL / OUT: 1100 mL / NET: -500 mL          Appearance: In no distress	  HEENT:    PERRL, EOMI	  Cardiovascular:  S1 S2, + JVD  Respiratory: Lungs clear to auscultation	  Gastrointestinal:  Soft, Non-tender, + BS	  Vascularature:  L BKA  Psychiatric: Appropriate affect   Neuro: no acute focal deficits                               9.6    6.95  )-----------( 158      ( 29 Aug 2024 06:04 )             35.0     08-30    142  |  100  |  44<H>  ----------------------------<  154<H>  3.9   |  26  |  1.76<H>    Ca    8.7      30 Aug 2024 06:49  Mg     2.2     08-30          Labs personally reviewed      ASSESSMENT/PLAN: 	        Problem/Plan - 1:  ·  Problem: Acute decompensated systolic heart failure.   ·  Plan: Continue 4mg iv bumex bid, will uptitrate based on response    - TTE 8/26 similar to prior wiith EF 20%  - GDMT Toprol 25mg daily  - Cont Entresto 24/26mg BID as BP tolerates  - Cont Aldactone 25mg  - Hydralazine 10mg PO TID initiated as per HF  - Farxiga/Jardiance 10mg daily following procedures/surgeries  - Appreciate HF recommendations; Possible CardioMems this admission ?  - As per wife, dry weight ~247lbs, currently 253.9. Likely decrease diuretic tomorrow. UOP net negative > 5L over last 24 hours. Cr stable/BUN uptrending.      Problem/Plan - 2:  ·  Problem: CAD (coronary artery disease).   ·  Plan: c/w asa and Plavix    Problem/Plan - 3:  ·  Problem: Chronic kidney disease, unspecified CKD stage.   ·  Plan: Monitor BMP daily, dose meds per renal fx, avoid nephrotoxins.    Problem/Plan - 4:  ·  Problem: ICD shock   ·  Plan: Inappropriate as per EP'  - settings adjusted  - Possible plan for ICD extraction and re-implant  - Current ICD disabled--> pacing pads on patient    Problem/Plan - 5:  ·  Problem: Prophylactic measure.   ·  Plan: VTE ppx: hep sq         Gertrude Wilfredo, KALLI-BLAIR Barnes, DO [facility]  Cardiovascular Medicine  [address] [place] [address], [address]  Available through call or text on [employer] TEAM[employer]  Office: [phone]   DATE OF SERVICE: 08-30-24 @ 11:54    Patient is a 63y old  Male who presents with a chief complaint of Acute on chronic systolic congestive heart failure     (27 Aug 2024 13:55)      INTERVAL HISTORY: Feels ok.     REVIEW OF SYSTEMS:  CONSTITUTIONAL: No weakness  EYES/ENT: No visual changes;  No throat pain   NECK: No pain or stiffness  RESPIRATORY: No cough, wheezing; No shortness of breath  CARDIOVASCULAR: No chest pain or palpitations  GASTROINTESTINAL: No abdominal  pain. No nausea, vomiting, or hematemesis  GENITOURINARY: No dysuria, frequency or hematuria  NEUROLOGICAL: No stroke like symptoms  SKIN: No rashes    TELEMETRY Personally reviewed: SR/ST 1st AVB   	  MEDICATIONS:  buMETAnide IVPB 4 milliGRAM(s) IV Intermittent <User Schedule>  hydrALAZINE 10 milliGRAM(s) Oral three times a day  metoprolol succinate ER 25 milliGRAM(s) Oral daily  sacubitril 24 mG/valsartan 26 mG 1 Tablet(s) Oral every 12 hours  spironolactone 25 milliGRAM(s) Oral daily        PHYSICAL EXAM:  T(C): 36.6 (08-30-24 @ 11:14), Max: 36.9 (08-30-24 @ 04:21)  HR: 96 (08-30-24 @ 11:14) (96 - 99)  BP: 99/66 (08-30-24 @ 11:14) (99/66 - 111/73)  RR: 18 (08-30-24 @ 11:14) (18 - 18)  SpO2: 95% (08-30-24 @ 11:14) (94% - 96%)  Wt(kg): --  I&O's Summary    29 Aug 2024 07:01  -  30 Aug 2024 07:00  --------------------------------------------------------  IN: 0 mL / OUT: 5350 mL / NET: -5350 mL    30 Aug 2024 07:01  -  30 Aug 2024 11:54  --------------------------------------------------------  IN: 600 mL / OUT: 1100 mL / NET: -500 mL          Appearance: In no distress	  HEENT:    PERRL, EOMI	  Cardiovascular:  S1 S2, + JVD  Respiratory: Lungs clear to auscultation	  Gastrointestinal:  Soft, Non-tender, + BS	  Vascularature:  L BKA  Psychiatric: Appropriate affect   Neuro: no acute focal deficits                               9.6    6.95  )-----------( 158      ( 29 Aug 2024 06:04 )             35.0     08-30    142  |  100  |  44<H>  ----------------------------<  154<H>  3.9   |  26  |  1.76<H>    Ca    8.7      30 Aug 2024 06:49  Mg     2.2     08-30          Labs personally reviewed      ASSESSMENT/PLAN: 	        Problem/Plan - 1:  ·  Problem: Acute decompensated systolic heart failure.   ·  Plan: Continue 4mg iv bumex bid, will uptitrate based on response    - TTE 8/26 similar to prior wiith EF 20%  - GDMT Toprol 25mg daily  - Cont Entresto 24/26mg BID as BP tolerates  - Cont Aldactone 25mg  - Hydralazine 10mg PO TID initiated as per HF  - Farxiga/Jardiance 10mg daily following procedures/surgeries  - Appreciate HF recommendations; Possible CardioMems this admission ?  - As per wife, dry weight ~247lbs, currently 253.9. Likely decrease diuretic tomorrow. UOP net negative > 5L over last 24 hours.   - Cr stable/BUN uptrending.      Problem/Plan - 2:  ·  Problem: CAD (coronary artery disease).   ·  Plan: c/w asa and Plavix    Problem/Plan - 3:  ·  Problem: Chronic kidney disease, unspecified CKD stage.   ·  Plan: Monitor BMP daily, dose meds per renal fx, avoid nephrotoxins.    Problem/Plan - 4:  ·  Problem: ICD shock   ·  Plan: Inappropriate as per EP'  - settings adjusted  - Possible plan for ICD extraction and re-implant  - Current ICD disabled--> pacing pads on patient    Problem/Plan - 5:  ·  Problem: Prophylactic measure.   ·  Plan: VTE ppx: hep sq         Gertrude Chiloquin, AG-BLAIR Barnes,  [facility]  Cardiovascular Medicine  [address] [place] [address], [address]  Available through call or text on [employer] TEAM[employer]  Office: [phone]

## 2024-08-30 NOTE — PROGRESS NOTE ADULT - ASSESSMENT
63 year old man with cardiomyopathy/CHF, inappropriate discharge of AICD, with normocytic anemia  Some anemic studies are pending but is appears to have GAEL  he has not responded to, nor is he tolerating, oral iron    SUGGEST: Venofer 200 mg IVPB x3  d/w patient and wife  rationale, risks, benefits explained  he will need outpatient f/u for his anemia

## 2024-08-30 NOTE — PROGRESS NOTE ADULT - SUBJECTIVE AND OBJECTIVE BOX
24H hour events: Pt without complaint, no acute events overnight, Tele: SR/ST 's    MEDICATIONS:  aspirin enteric coated 81 milliGRAM(s) Oral daily  buMETAnide IVPB 4 milliGRAM(s) IV Intermittent <User Schedule>  clopidogrel Tablet 75 milliGRAM(s) Oral daily  hydrALAZINE 10 milliGRAM(s) Oral three times a day  metoprolol succinate ER 25 milliGRAM(s) Oral daily  sacubitril 24 mG/valsartan 26 mG 1 Tablet(s) Oral every 12 hours  spironolactone 25 milliGRAM(s) Oral daily    levoFLOXacin  Tablet 500 milliGRAM(s) Oral every 24 hours      acetaminophen     Tablet .. 650 milliGRAM(s) Oral every 6 hours PRN  ALPRAZolam 0.25 milliGRAM(s) Oral once  melatonin 3 milliGRAM(s) Oral at bedtime PRN  ondansetron Injectable 4 milliGRAM(s) IV Push every 8 hours PRN  oxyCODONE    IR 5 milliGRAM(s) Oral daily PRN    aluminum hydroxide/magnesium hydroxide/simethicone Suspension 30 milliLiter(s) Oral every 4 hours PRN  pantoprazole    Tablet 40 milliGRAM(s) Oral before breakfast    dextrose 50% Injectable 25 Gram(s) IV Push once  dextrose 50% Injectable 25 Gram(s) IV Push once  dextrose 50% Injectable 12.5 Gram(s) IV Push once  dextrose Oral Gel 15 Gram(s) Oral once PRN  glucagon  Injectable 1 milliGRAM(s) IntraMuscular once  insulin glargine Injectable (LANTUS) 30 Unit(s) SubCutaneous at bedtime  insulin lispro (ADMELOG) corrective regimen sliding scale   SubCutaneous three times a day before meals  insulin lispro (ADMELOG) corrective regimen sliding scale   SubCutaneous at bedtime  insulin lispro Injectable (ADMELOG) 10 Unit(s) SubCutaneous three times a day before meals  rosuvastatin 20 milliGRAM(s) Oral at bedtime    ammonium lactate 12% Lotion 1 Application(s) Topical every 12 hours  dextrose 5%. 1000 milliLiter(s) IV Continuous <Continuous>  dextrose 5%. 1000 milliLiter(s) IV Continuous <Continuous>  sodium chloride 0.65% Nasal 2 Spray(s) Both Nostrils four times a day  tamsulosin 0.4 milliGRAM(s) Oral at bedtime      REVIEW OF SYSTEMS:  Complete 12 point ROS negative.    PHYSICAL EXAM:  T(C): 36.9 (08-30-24 @ 04:21), Max: 36.9 (08-30-24 @ 04:21)  HR: 99 (08-30-24 @ 04:21) (95 - 99)  BP: 111/73 (08-30-24 @ 04:21) (98/64 - 111/73)  RR: 18 (08-30-24 @ 04:21) (17 - 18)  SpO2: 96% (08-30-24 @ 04:21) (92% - 96%)  Wt(kg): --  I&O's Summary    29 Aug 2024 07:01  -  30 Aug 2024 07:00  --------------------------------------------------------  IN: 0 mL / OUT: 5350 mL / NET: -5350 mL    30 Aug 2024 07:01  -  30 Aug 2024 09:49  --------------------------------------------------------  IN: 0 mL / OUT: 400 mL / NET: -400 mL        Appearance: Normal	  HEENT: PERRL, EOMI	  Cardiovascular: Normal S1 S2, No JVD, No murmurs  Respiratory: Lungs clear to auscultation	  Psychiatry: A & O x 3, Mood & affect appropriate  Gastrointestinal:  Soft, Non-tender, + BS	  Skin: No rashes, No ecchymoses, No cyanosis	  Neurologic: Grossly intact  Extremities: No clubbing, cyanosis or edema  Vascular: Peripheral pulses palpable 2+ bilaterally        LABS:	 	    CBC Full  -  ( 29 Aug 2024 06:04 )  WBC Count : 6.95 K/uL  Hemoglobin : 9.6 g/dL  Hematocrit : 35.0 %  Platelet Count - Automated : 158 K/uL  Mean Cell Volume : 71.7 fl  Mean Cell Hemoglobin : 19.7 pg  Mean Cell Hemoglobin Concentration : 27.4 gm/dL  Auto Neutrophil # : x  Auto Lymphocyte # : x  Auto Monocyte # : x  Auto Eosinophil # : x  Auto Basophil # : x  Auto Neutrophil % : x  Auto Lymphocyte % : x  Auto Monocyte % : x  Auto Eosinophil % : x  Auto Basophil % : x    08-30    142  |  100  |  44<H>  ----------------------------<  154<H>  3.9   |  26  |  1.76<H>  08-29    141  |  98  |  40<H>  ----------------------------<  116<H>  3.3<L>   |  27  |  1.86<H>    Ca    8.7      30 Aug 2024 06:49  Ca    8.9      29 Aug 2024 06:05  Mg     2.2     08-30  Mg     2.3     08-29        proBNP:   Lipid Profile:   HgA1c:   TSH:       CARDIAC MARKERS:          TELEMETRY: 	    ECG:  	  RADIOLOGY:  OTHER: 	    PREVIOUS DIAGNOSTIC TESTING:    [ ] Echocardiogram:    [ ]  Catheterization:  [ ] Stress Test:  	  	  ASSESSMENT/PLAN: 	     24H hour events: Pt without complaint, no acute events overnight, Tele: SR/ST 's    MEDICATIONS:  aspirin enteric coated 81 milliGRAM(s) Oral daily  buMETAnide IVPB 4 milliGRAM(s) IV Intermittent <User Schedule>  clopidogrel Tablet 75 milliGRAM(s) Oral daily  hydrALAZINE 10 milliGRAM(s) Oral three times a day  metoprolol succinate ER 25 milliGRAM(s) Oral daily  sacubitril 24 mG/valsartan 26 mG 1 Tablet(s) Oral every 12 hours  spironolactone 25 milliGRAM(s) Oral daily  levoFLOXacin  Tablet 500 milliGRAM(s) Oral every 24 hours  acetaminophen     Tablet .. 650 milliGRAM(s) Oral every 6 hours PRN  ALPRAZolam 0.25 milliGRAM(s) Oral once  melatonin 3 milliGRAM(s) Oral at bedtime PRN  ondansetron Injectable 4 milliGRAM(s) IV Push every 8 hours PRN  oxyCODONE    IR 5 milliGRAM(s) Oral daily PRN  aluminum hydroxide/magnesium hydroxide/simethicone Suspension 30 milliLiter(s) Oral every 4 hours PRN  pantoprazole    Tablet 40 milliGRAM(s) Oral before breakfast  dextrose 50% Injectable 25 Gram(s) IV Push once  dextrose 50% Injectable 25 Gram(s) IV Push once  dextrose 50% Injectable 12.5 Gram(s) IV Push once  dextrose Oral Gel 15 Gram(s) Oral once PRN  glucagon  Injectable 1 milliGRAM(s) IntraMuscular once  insulin glargine Injectable (LANTUS) 30 Unit(s) SubCutaneous at bedtime  insulin lispro (ADMELOG) corrective regimen sliding scale   SubCutaneous three times a day before meals  insulin lispro (ADMELOG) corrective regimen sliding scale   SubCutaneous at bedtime  insulin lispro Injectable (ADMELOG) 10 Unit(s) SubCutaneous three times a day before meals  rosuvastatin 20 milliGRAM(s) Oral at bedtime  ammonium lactate 12% Lotion 1 Application(s) Topical every 12 hours  dextrose 5%. 1000 milliLiter(s) IV Continuous <Continuous>  dextrose 5%. 1000 milliLiter(s) IV Continuous <Continuous>  sodium chloride 0.65% Nasal 2 Spray(s) Both Nostrils four times a day  tamsulosin 0.4 milliGRAM(s) Oral at bedtime      REVIEW OF SYSTEMS:  Complete 12 point ROS negative.    PHYSICAL EXAM:  T(C): 36.9 (08-30-24 @ 04:21), Max: 36.9 (08-30-24 @ 04:21)  HR: 99 (08-30-24 @ 04:21) (95 - 99)  BP: 111/73 (08-30-24 @ 04:21) (98/64 - 111/73)  RR: 18 (08-30-24 @ 04:21) (17 - 18)  SpO2: 96% (08-30-24 @ 04:21) (92% - 96%)  Wt(kg): --  I&O's Summary    29 Aug 2024 07:01  -  30 Aug 2024 07:00  --------------------------------------------------------  IN: 0 mL / OUT: 5350 mL / NET: -5350 mL    30 Aug 2024 07:01  -  30 Aug 2024 09:49  --------------------------------------------------------  IN: 0 mL / OUT: 400 mL / NET: -400 mL      Appearance: NAD, OOB sitting up in chair	  HEENT: PERRL, EOMI	  Cardiovascular: Normal S1 S2, No JVD, No murmurs  Respiratory: Lungs clear to auscultation	  Psychiatry: A & O x 3, Mood & affect appropriate  Gastrointestinal: Soft, Non-tender, + BS	  Skin: RLE wound wrapped in gauze, dressing site C/D/I  Neurologic: Grossly intact  Extremities: No clubbing, cyanosis or edema. Left BKA.   Vascular: Peripheral pulses palpable 2+ bilaterally        LABS:	 	    CBC Full  -  ( 29 Aug 2024 06:04 )  WBC Count : 6.95 K/uL  Hemoglobin : 9.6 g/dL  Hematocrit : 35.0 %  Platelet Count - Automated : 158 K/uL  Mean Cell Volume : 71.7 fl  Mean Cell Hemoglobin : 19.7 pg  Mean Cell Hemoglobin Concentration : 27.4 gm/dL  Auto Neutrophil # : x  Auto Lymphocyte # : x  Auto Monocyte # : x  Auto Eosinophil # : x  Auto Basophil # : x  Auto Neutrophil % : x  Auto Lymphocyte % : x  Auto Monocyte % : x  Auto Eosinophil % : x  Auto Basophil % : x    08-30    142  |  100  |  44<H>  ----------------------------<  154<H>  3.9   |  26  |  1.76<H>  08-29    141  |  98  |  40<H>  ----------------------------<  116<H>  3.3<L>   |  27  |  1.86<H>    Ca    8.7      30 Aug 2024 06:49  Ca    8.9      29 Aug 2024 06:05  Mg     2.2     08-30  Mg     2.3     08-29      TELEMETRY: SR/ST 's 	     < from: TTE W or WO Ultrasound Enhancing Agent (08.26.24 @ 11:28) >  CONCLUSIONS:      1. Left ventricular cavity is severely dilated. Left ventricular wall thickness is normal. Left ventricular systolic function is severely decreased with an ejection fraction of 22 % by Bell's method of disks. Regional wall motion abnormalities present.   2. Multiple segmental abnormalities exist. See findings.   3. Normal right ventricular cavity size, with normal wall thickness, and probably normal right ventricular systolic function.   4. Estimated pulmonary artery systolic pressure is 30 mmHg.   5. Trace pericardial effusion.    ________________________________________________________________________________________  FINDINGS:     Left Ventricle:  After obtaining consent, Definity ultrasound enhancing agent was given for enhanced left ventricular opacification and improved delineation of the left ventricular endocardial borders. The left ventricular cavity is severely dilated. Left ventricular wall thickness is normal. Left ventricular systolic function is severely decreased with a calculated ejection fraction of 22 % by the Bell's biplane method of disks. There are regional wall motion abnormalities present. Elevated left ventricular filling pressure.  LV Wall Scoring: The entire apex, entire anterior wall, basal and mid anterior  septum, and mid inferolateral segment are akinetic. The basal and mid  anterolateral wall, basal and mid inferior septum, basal and mid inferior wall,  and basal inferolateral segment are hypokinetic.          Right Ventricle:  The right ventricular cavity is normal in size, with normal wall thickness and right ventricular systolic function is probably normal. Tricuspid annular plane systolic excursion (TAPSE) is 2.0 cm (normal >=1.7 cm).     Left Atrium:  The left atrium is normal in size with an indexed volume of 28.34 ml/m².     Right Atrium:  The right atrium is severely dilated with an indexed volume of 42.58 ml/m².     Interatrial Septum:  The interatrial septum appears intact.     Aortic Valve:  Normal aortic valve. There is no aortic valve stenosis.     Mitral Valve:  There is no mitral valve stenosis. There is mild mitral regurgitation.     Tricuspid Valve:  Structurally normal tricuspid valve with normal leaflet excursion. There is massive tricuspid regurgitation. Estimated pulmonary artery systolic pressure is 30 mmHg.     Pulmonic Valve:  Normal pulmonic valve. There is trace pulmonic regurgitation.     Aorta:  The aortic root appears normal in size.     Pericardium:  There is a trace pericardial effusion.     Systemic Veins:  The inferior vena cava is normal in size (normal <2.1cm) with normal inspiratory collapse (normal >50%) consistent with normal right atrial pressure (~3, range 0-5mmHg).  ____________________________________________________________________  QUANTITATIVE DATA:  Left Ventricle Measurements: (Indexed to BSA)     IVSd (2D):   0.9 cm  LVPWd (2D):  1.4 cm  LVIDd (2D):  6.8 cm  LVIDs (2D):  5.7 cm  LV Mass:     357 g  141.9 g/m²  LV Vol d, MOD A2C: 268.0 ml 106.66 ml/m²  LV Vol d, MOD A4C: 246.0 ml 97.90 ml/m²  LV Vol d, MOD BP:  260.0 ml 103.48 ml/m²  LV Vol s, MOD A2C: 209.0 ml 83.18 ml/m²  LV Vol s, MOD A4C: 191.0 ml 76.01 ml/m²  LV Vol s, MOD BP:  202.5 ml 80.58 ml/m²  LVOT SV MOD BP:    57.6 ml  LV EF% MOD BP:     22 %     MV E Vmax:    0.85 m/s  MV A Vmax:    0.56 m/s  MV E/A:       1.53  e' lateral:   5.66 cm/s  e' medial:    5.44 cm/s  E/e' lateral: 15.07  E/e' medial:  15.68  E/e' Average: 15.37    Aorta Measurements: (Normal range) (Indexed to BSA)     Ao Root d     3.30 cm (3.1 - 3.7 cm) 1.31 cm/m²  Ao Annulus:   2.1 cm (2.3 - 2.9 cm)  Ao Asc d, 2D: 3.60  Ao Asc prox:  3.50 cm                1.39 cm/m²            Left Atrium Measurements: (Indexed to BSA)  LA Diam 2D:        4.80 cm  LA Vol s, MOD A4C: 76.70 ml.  LA Vol s, MOD A2C: 66.20 ml.  LA Vol s, MOD BP:  71.20 ml  28.34 ml/m²         Right Ventricle Measurements: Right Atrial Measurements:     TAPSE:      2.0 cm            RA Vol:       107.00 ml  RV S' Vmax: 9.57 cm/s         RA Vol Index: 42.58 ml/m²       LVOT / RVOT/ Qp/Qs Data: (Indexed to BSA)  LVOT Diameter:  2.10 cm  LVOT Area:      3.46 cm²  LVOT Vmax:      0.55 m/s  LVOT Vmn:       0.351 m/s  LVOT VTI:       10.50 cm  LVOT peak grad: 1 mmHg  LVOT mean grad: 1.0 mmHg  LVOT SV:        36.4 ml   14.47 ml/m²    Mitral Valve Measurements:     MV E Vmax: 0.9 m/s  MV A Vmax: 0.6 m/s  MV E/A:    1.5       Tricuspid Valve Measurements:     TR Vmean:          2.0 m/s  TR Vmax:           2.6 m/s  TR Peak Gradient:  26.6 mmHg  RA Pressure:       3 mmHg  PASP:              30 mmHg  TR PISA Radius:    0.80 cm  TR Aliasing Larry:   0.38 m/s  TR Inst Flow Rate: 154.8 ml/s  TR VTI:            85.80 cm  TR Eff KATHY:        0.60 cm²  TR Reg Volume:     51.49 ml    < end of copied text >

## 2024-08-30 NOTE — PROGRESS NOTE ADULT - SUBJECTIVE AND OBJECTIVE BOX
HPI:  63M PMHx HFrEF s/p ICD, HTN, T2DM, s/p LLE amputation   Pt presented w/ ICD shock 30min prior to arrival to ED. Pt reports he's been having progressively worsening GREWAL, orthopnea and SOB for the past few days.     In the ED, BP on the softer side, otherwise VSS. OK on RA.   CBC w/ wbc 7.2, hgb 9.3, plt 147.   CMP w/ SCr 1.66, LFT unremarkable. Trop 77, 74. proBNP 3255.   CXR w/ RLL opacity, cannot r/o infection.     ICD interrogated by EP in the ED, ICD oversensing and shock was inappropriate.   Pt also found w/ new RBBB.   ED rec c/w beta blockers.   Pt received full dose asa and bumex in the ED for fluid overload.   Admit for ADHF.  (25 Aug 2024 00:08)     our group was consulted for anemia. a month ago he had been anemic, and his wife tried oral iron but this was poorly tolerated due to constipation    Today he has no new complaints    ROS:  Negative except for: fatigue    MEDICATIONS  (STANDING):  ALPRAZolam 0.25 milliGRAM(s) Oral once  ammonium lactate 12% Lotion 1 Application(s) Topical every 12 hours  aspirin enteric coated 81 milliGRAM(s) Oral daily  buMETAnide IVPB 12 milliGRAM(s) IV Intermittent <User Schedule>  clopidogrel Tablet 75 milliGRAM(s) Oral daily  dextrose 5%. 1000 milliLiter(s) (100 mL/Hr) IV Continuous <Continuous>  dextrose 5%. 1000 milliLiter(s) (50 mL/Hr) IV Continuous <Continuous>  dextrose 50% Injectable 25 Gram(s) IV Push once  dextrose 50% Injectable 12.5 Gram(s) IV Push once  dextrose 50% Injectable 25 Gram(s) IV Push once  glucagon  Injectable 1 milliGRAM(s) IntraMuscular once  hydrALAZINE 25 milliGRAM(s) Oral three times a day  insulin glargine Injectable (LANTUS) 30 Unit(s) SubCutaneous at bedtime  insulin lispro (ADMELOG) corrective regimen sliding scale   SubCutaneous three times a day before meals  insulin lispro (ADMELOG) corrective regimen sliding scale   SubCutaneous at bedtime  insulin lispro Injectable (ADMELOG) 10 Unit(s) SubCutaneous three times a day before meals  levoFLOXacin  Tablet 500 milliGRAM(s) Oral every 24 hours  metoprolol succinate ER 25 milliGRAM(s) Oral daily  pantoprazole    Tablet 40 milliGRAM(s) Oral before breakfast  rosuvastatin 20 milliGRAM(s) Oral at bedtime  sacubitril 24 mG/valsartan 26 mG 1 Tablet(s) Oral every 12 hours  sodium chloride 0.65% Nasal 2 Spray(s) Both Nostrils four times a day  spironolactone 25 milliGRAM(s) Oral daily  tamsulosin 0.4 milliGRAM(s) Oral at bedtime    MEDICATIONS  (PRN):  acetaminophen     Tablet .. 650 milliGRAM(s) Oral every 6 hours PRN Temp greater or equal to 38C (100.4F), Mild Pain (1 - 3)  aluminum hydroxide/magnesium hydroxide/simethicone Suspension 30 milliLiter(s) Oral every 4 hours PRN Dyspepsia  dextrose Oral Gel 15 Gram(s) Oral once PRN Blood Glucose LESS THAN 70 milliGRAM(s)/deciliter  melatonin 3 milliGRAM(s) Oral at bedtime PRN Insomnia  ondansetron Injectable 4 milliGRAM(s) IV Push every 8 hours PRN Nausea and/or Vomiting  oxyCODONE    IR 5 milliGRAM(s) Oral daily PRN Severe Pain (7 - 10)      Allergies    ceftriaxone (Rash)  doxepin (Other)  Zosyn (Pruritus)  vancomycin (Rash (Mild to Mod))    Intolerances        Vital Signs Last 24 Hrs  T(C): 36.6 (30 Aug 2024 11:14), Max: 36.9 (30 Aug 2024 04:21)  T(F): 97.9 (30 Aug 2024 11:14), Max: 98.4 (30 Aug 2024 04:21)  HR: 96 (30 Aug 2024 17:20) (95 - 99)  BP: 99/62 (30 Aug 2024 17:20) (94/58 - 111/73)  BP(mean): --  RR: 18 (30 Aug 2024 16:45) (18 - 18)  SpO2: 94% (30 Aug 2024 16:45) (94% - 96%)    Parameters below as of 30 Aug 2024 16:45  Patient On (Oxygen Delivery Method): room air        PHYSICAL EXAM:      Constitutional: NAD    Eyes: anicteric    ENMT:    Neck:    Lymph nodes:    Respiratory: clear    Cardiovascular: regular    Gastrointestinal:    Extremities: L AKA    Skin:                    LABS:                          9.6    6.95  )-----------( 158      ( 29 Aug 2024 06:04 )             35.0         Mean Cell Volume : 71.7 fl  Mean Cell Hemoglobin : 19.7 pg  Mean Cell Hemoglobin Concentration : 27.4 gm/dL  Auto Neutrophil # : x  Auto Lymphocyte # : x  Auto Monocyte # : x  Auto Eosinophil # : x  Auto Basophil # : x  Auto Neutrophil % : x  Auto Lymphocyte % : x  Auto Monocyte % : x  Auto Eosinophil % : x  Auto Basophil % : x    Serial CBC  Hematocrit --  Hemoglobin --  Plat --  RBC 4.70  WBC --  Serial CBC  Hematocrit 35.0  Hemoglobin 9.6  Plat 158  RBC 4.88  WBC 6.95  Serial CBC  Hematocrit 35.4  Hemoglobin 9.8  Plat 163  RBC 4.86  WBC 7.96  Serial CBC  Hematocrit 34.8  Hemoglobin 9.5  Plat 154  RBC 4.85  WBC 6.36    08-30    142  |  100  |  44<H>  ----------------------------<  154<H>  3.9   |  26  |  1.76<H>    Ca    8.7      30 Aug 2024 06:49  Mg     2.2     08-30

## 2024-08-30 NOTE — PROGRESS NOTE ADULT - PROBLEM SELECTOR PLAN 3
Etiology: ischemic  BB: c/w toprol 25mg qd  ACE/ARB/ARNI: c/w entresto 24-26mg BID.  hold parameters SBP <90mmhg  MRA: c/w spironolactone 25mg qd  SGLT2: hold for now given possible EP intervention this admission, consider prior to dc  Device: S-ICD with inappropriate shocks, pending extraction and EV-ICD reimplant this admission  Afterload: increase hydralazine to 25mg TID    Diuresis: would start bumex gtt at 1 mg/hr during the day from 8a-8p (to allow for rest overnight).  Prior to starting gtt in the morning, would bolus dose 4mg IV bumex.  Check electrolytes twice daily while on gtt  Would give Iron sucrose x 5 days for iron deficiency in decompensated heart failure

## 2024-08-31 LAB
ANION GAP SERPL CALC-SCNC: 13 MMOL/L — SIGNIFICANT CHANGE UP (ref 5–17)
ANION GAP SERPL CALC-SCNC: 15 MMOL/L — SIGNIFICANT CHANGE UP (ref 5–17)
BUN SERPL-MCNC: 46 MG/DL — HIGH (ref 7–23)
BUN SERPL-MCNC: 47 MG/DL — HIGH (ref 7–23)
CALCIUM SERPL-MCNC: 8.8 MG/DL — SIGNIFICANT CHANGE UP (ref 8.4–10.5)
CALCIUM SERPL-MCNC: 8.9 MG/DL — SIGNIFICANT CHANGE UP (ref 8.4–10.5)
CHLORIDE SERPL-SCNC: 98 MMOL/L — SIGNIFICANT CHANGE UP (ref 96–108)
CHLORIDE SERPL-SCNC: 98 MMOL/L — SIGNIFICANT CHANGE UP (ref 96–108)
CO2 SERPL-SCNC: 28 MMOL/L — SIGNIFICANT CHANGE UP (ref 22–31)
CO2 SERPL-SCNC: 28 MMOL/L — SIGNIFICANT CHANGE UP (ref 22–31)
CREAT SERPL-MCNC: 1.78 MG/DL — HIGH (ref 0.5–1.3)
CREAT SERPL-MCNC: 1.78 MG/DL — HIGH (ref 0.5–1.3)
EGFR: 43 ML/MIN/1.73M2 — LOW
EGFR: 43 ML/MIN/1.73M2 — LOW
GLUCOSE BLDC GLUCOMTR-MCNC: 157 MG/DL — HIGH (ref 70–99)
GLUCOSE BLDC GLUCOMTR-MCNC: 167 MG/DL — HIGH (ref 70–99)
GLUCOSE BLDC GLUCOMTR-MCNC: 174 MG/DL — HIGH (ref 70–99)
GLUCOSE BLDC GLUCOMTR-MCNC: 189 MG/DL — HIGH (ref 70–99)
GLUCOSE SERPL-MCNC: 147 MG/DL — HIGH (ref 70–99)
GLUCOSE SERPL-MCNC: 183 MG/DL — HIGH (ref 70–99)
MAGNESIUM SERPL-MCNC: 2.3 MG/DL — SIGNIFICANT CHANGE UP (ref 1.6–2.6)
MAGNESIUM SERPL-MCNC: 2.3 MG/DL — SIGNIFICANT CHANGE UP (ref 1.6–2.6)
POTASSIUM SERPL-MCNC: 3.5 MMOL/L — SIGNIFICANT CHANGE UP (ref 3.5–5.3)
POTASSIUM SERPL-MCNC: 3.5 MMOL/L — SIGNIFICANT CHANGE UP (ref 3.5–5.3)
POTASSIUM SERPL-SCNC: 3.5 MMOL/L — SIGNIFICANT CHANGE UP (ref 3.5–5.3)
POTASSIUM SERPL-SCNC: 3.5 MMOL/L — SIGNIFICANT CHANGE UP (ref 3.5–5.3)
SODIUM SERPL-SCNC: 139 MMOL/L — SIGNIFICANT CHANGE UP (ref 135–145)
SODIUM SERPL-SCNC: 141 MMOL/L — SIGNIFICANT CHANGE UP (ref 135–145)

## 2024-08-31 PROCEDURE — ZZZZZ: CPT

## 2024-08-31 PROCEDURE — 99232 SBSQ HOSP IP/OBS MODERATE 35: CPT | Mod: FS

## 2024-08-31 RX ADMIN — Medication 25 MILLIGRAM(S): at 05:49

## 2024-08-31 RX ADMIN — INSULIN GLARGINE 30 UNIT(S): 100 INJECTION, SOLUTION SUBCUTANEOUS at 21:53

## 2024-08-31 RX ADMIN — IRON SUCROSE 146.67 MILLIGRAM(S): 20 INJECTION, SOLUTION INTRAVENOUS at 21:52

## 2024-08-31 RX ADMIN — Medication 1: at 12:17

## 2024-08-31 RX ADMIN — Medication 10 UNIT(S): at 12:18

## 2024-08-31 RX ADMIN — Medication 1: at 08:03

## 2024-08-31 RX ADMIN — OXYCODONE HYDROCHLORIDE 5 MILLIGRAM(S): 5 TABLET ORAL at 22:05

## 2024-08-31 RX ADMIN — Medication 1 APPLICATION(S): at 05:49

## 2024-08-31 RX ADMIN — Medication 40 MILLIGRAM(S): at 05:48

## 2024-08-31 RX ADMIN — Medication 25 MILLIGRAM(S): at 21:53

## 2024-08-31 RX ADMIN — Medication 1 APPLICATION(S): at 17:49

## 2024-08-31 RX ADMIN — METOPROLOL TARTRATE 25 MILLIGRAM(S): 100 TABLET ORAL at 05:48

## 2024-08-31 RX ADMIN — Medication 1: at 17:49

## 2024-08-31 RX ADMIN — Medication 25 MILLIGRAM(S): at 13:50

## 2024-08-31 RX ADMIN — Medication 75 MILLIGRAM(S): at 12:17

## 2024-08-31 RX ADMIN — TAMSULOSIN HYDROCHLORIDE 0.4 MILLIGRAM(S): 0.4 CAPSULE ORAL at 21:53

## 2024-08-31 RX ADMIN — SACUBITRIL AND VALSARTAN 1 TABLET(S): 49; 51 TABLET, FILM COATED ORAL at 17:51

## 2024-08-31 RX ADMIN — Medication 81 MILLIGRAM(S): at 12:17

## 2024-08-31 RX ADMIN — BUMETANIDE 132 MILLIGRAM(S): 2 TABLET ORAL at 07:27

## 2024-08-31 RX ADMIN — Medication 10 UNIT(S): at 08:35

## 2024-08-31 RX ADMIN — OXYCODONE HYDROCHLORIDE 5 MILLIGRAM(S): 5 TABLET ORAL at 22:30

## 2024-08-31 RX ADMIN — ROSUVASTATIN CALCIUM 20 MILLIGRAM(S): 10 TABLET ORAL at 21:53

## 2024-08-31 RX ADMIN — BUMETANIDE 9 MILLIGRAM(S): 2 TABLET ORAL at 08:00

## 2024-08-31 RX ADMIN — SACUBITRIL AND VALSARTAN 1 TABLET(S): 49; 51 TABLET, FILM COATED ORAL at 05:48

## 2024-08-31 RX ADMIN — SPIRONOLACTONE 25 MILLIGRAM(S): 25 TABLET, FILM COATED ORAL at 05:48

## 2024-08-31 RX ADMIN — Medication 10 UNIT(S): at 17:50

## 2024-08-31 NOTE — PROGRESS NOTE ADULT - ASSESSMENT
iron def anemia s/p first does IV iron sucrose tolerated well, continue daily for three days  will need GI evaluation after cardiac status stabilized

## 2024-08-31 NOTE — PROGRESS NOTE ADULT - PROBLEM SELECTOR PLAN 4
ICD w/ inappropriate shock  Pending extraction and EV-ICD reimplantation once optimized- still requires significant diuresis prior  EP following. ICD w/ inappropriate shock  Pending extraction and EV-ICD reimplantation once optimized- still requires diuersis.   EP following.

## 2024-08-31 NOTE — PROGRESS NOTE ADULT - ASSESSMENT
63 year old male with hx of ischemic cardiomyopathy with HFrEF 30%, cardiac arrest with prior transvenous ICD extraction for infection and subsequent [employer] S-ICD implant 2020, CKD 3, HTN, T2 DM, COPD, LUIS ALFREDO, and LLE amputee who presented after ICD shock. S-ICD Interrogation showed an inappropriate ICD shock for device oversensing (see procedure note for full interrogation). Patient denies prior ICD shocks in either devices that he has had.        1. Inappropriate S-ICD shock for device oversensing  2. Acute decompensated heart failure   3. right lower extremity Cellulitis, wound    - ICD remains deactivated for inappropriate device oversensing leading to inappropriate shock likely in setting of new RBBB on EKG which was not present in 2020 EKG  - Currently being treated for HF exacerbation with IV Bumex and cellulitis with oral antibiotics   - Plan for S-ICD removal and EV-ICD implant by Dr. Rodríguez (tentatively scheduled for Tuesday 9/3/24 in the OR)  - Needs to be optimized from a HF and infectious standpoint.  - Please avoid Farxiga/ Jardiance medications in preparation for surgical procedure  - Keep NPO after midnight prior to procedure  - Ensure type & screen x 2 active    Plan discussed with Dr. Betancur

## 2024-08-31 NOTE — PROGRESS NOTE ADULT - ASSESSMENT
63 year old Male with PMHx HFrEF s/p ICD, HTN, T2DM, s/p LLE amputation in [place] ~2 months ago. Pt presented to ED for ICD shock.        1- CKDIII  2- CHF  3- DM2  4- anemia  5- cellulitis       per chart review, creatinine ranges higher   creatinine is steady  creatinine expected to rise as fluid status improves  bumex drip 1 mg hr with aldactone 25 mg daily for CHF  non oliguric  ICD deactivated, EP following for possible removal and re-implant tuesday  anemia, trend hgb  levofloxacin 500 mg daily for cellulitis   continue entresto 24/46 mg bid   trend bp  strict I/O  daily standing weight   trend creatinine and electrolytes daily

## 2024-08-31 NOTE — PROGRESS NOTE ADULT - SUBJECTIVE AND OBJECTIVE BOX
64 yo man CAD AICD malfunctioned has iron deficiency anemia not responsive to oral iron. last colonoscopy 2 yr ago undergoing IV iron  pmh sh fh unchanged 7 pt ros negative  physical  obese  vs  t98.2 101/61 92 sat  lungs clear  cor s1s2  abd soft obese  ext s/p LLE below knee amputation    data  none

## 2024-08-31 NOTE — PROGRESS NOTE ADULT - SUBJECTIVE AND OBJECTIVE BOX
[facility]   [phone]  RENAL FOLLOW UP NOTE  --------------------------------------------------------------------------------  Chief Complaint:    24 hour events/subjective:    seen earlier   states breathing is better     PAST HISTORY  --------------------------------------------------------------------------------  No significant changes to PMH, PSH, FHx, SHx, unless otherwise noted    ALLERGIES & MEDICATIONS  --------------------------------------------------------------------------------  Allergies    ceftriaxone (Rash)  doxepin (Other)  Zosyn (Pruritus)  vancomycin (Rash (Mild to Mod))    Intolerances      Standing Inpatient Medications  ALPRAZolam 0.25 milliGRAM(s) Oral once  ammonium lactate 12% Lotion 1 Application(s) Topical every 12 hours  aspirin enteric coated 81 milliGRAM(s) Oral daily  buMETAnide IVPB 12 milliGRAM(s) IV Intermittent <User Schedule>  buMETAnide IVPB 4 milliGRAM(s) IV Intermittent <User Schedule>  clopidogrel Tablet 75 milliGRAM(s) Oral daily  dextrose 5%. 1000 milliLiter(s) IV Continuous <Continuous>  dextrose 5%. 1000 milliLiter(s) IV Continuous <Continuous>  dextrose 50% Injectable 25 Gram(s) IV Push once  dextrose 50% Injectable 25 Gram(s) IV Push once  dextrose 50% Injectable 12.5 Gram(s) IV Push once  glucagon  Injectable 1 milliGRAM(s) IntraMuscular once  hydrALAZINE 25 milliGRAM(s) Oral three times a day  insulin glargine Injectable (LANTUS) 30 Unit(s) SubCutaneous at bedtime  insulin lispro (ADMELOG) corrective regimen sliding scale   SubCutaneous three times a day before meals  insulin lispro (ADMELOG) corrective regimen sliding scale   SubCutaneous at bedtime  insulin lispro Injectable (ADMELOG) 10 Unit(s) SubCutaneous three times a day before meals  iron sucrose IVPB 200 milliGRAM(s) IV Intermittent every 24 hours  levoFLOXacin  Tablet 500 milliGRAM(s) Oral every 24 hours  metoprolol succinate ER 25 milliGRAM(s) Oral daily  pantoprazole    Tablet 40 milliGRAM(s) Oral before breakfast  rosuvastatin 20 milliGRAM(s) Oral at bedtime  sacubitril 24 mG/valsartan 26 mG 1 Tablet(s) Oral every 12 hours  sodium chloride 0.65% Nasal 2 Spray(s) Both Nostrils four times a day  spironolactone 25 milliGRAM(s) Oral daily  tamsulosin 0.4 milliGRAM(s) Oral at bedtime    PRN Inpatient Medications  acetaminophen     Tablet .. 650 milliGRAM(s) Oral every 6 hours PRN  aluminum hydroxide/magnesium hydroxide/simethicone Suspension 30 milliLiter(s) Oral every 4 hours PRN  dextrose Oral Gel 15 Gram(s) Oral once PRN  melatonin 3 milliGRAM(s) Oral at bedtime PRN  ondansetron Injectable 4 milliGRAM(s) IV Push every 8 hours PRN  oxyCODONE    IR 5 milliGRAM(s) Oral daily PRN      REVIEW OF SYSTEMS  --------------------------------------------------------------------------------    Gen: denies  fevers/chills,  CVS: denies chest pain/palpitations  Resp: denies SOB/Cough  GI: Denies N/V/Abd pain  : Denies dysuria   states urinating well     VITALS/PHYSICAL EXAM  --------------------------------------------------------------------------------  T(C): 36.7 (08-31-24 @ 11:33), Max: 36.8 (08-31-24 @ 04:38)  HR: 93 (08-31-24 @ 16:38) (90 - 102)  BP: 99/60 (08-31-24 @ 16:38) (95/59 - 101/61)  RR: 18 (08-31-24 @ 11:33) (18 - 18)  SpO2: 93% (08-31-24 @ 11:33) (92% - 94%)  Wt(kg): --        08-30-24 @ 07:01  -  08-31-24 @ 07:00  --------------------------------------------------------  IN: 775 mL / OUT: 3900 mL / NET: -3125 mL    08-31-24 @ 07:01  -  08-31-24 @ 20:06  --------------------------------------------------------  IN: 850 mL / OUT: 900 mL / NET: -50 mL      Physical Exam:  	    	Gen: Non toxic comfortable appearing   	Pulm: decrease bs  no rales or ronchi or wheezing  	CV: +mild JVD, RRR, S1S2; no rub  	Back: No CVA tenderness; no sacral edema  	Abd: +BS, soft, nontender/+distended, +abd wall edema  	: No suprapubic tenderness  	UE: Warm, no cyanosis  no clubbing,  no edema;  	LE: Warm, no cyanosis  no clubbing, RLE chronic venous stasis, +edema, LLE BKA  	Neuro: alert and oriented. speech coherent       LABS/STUDIES  --------------------------------------------------------------------------------    139  |  98  |  47  ----------------------------<  183      [08-31-24 @ 18:35]  3.5   |  28  |  1.78        Ca     8.8     [08-31-24 @ 18:35]      Mg     2.3     [08-31-24 @ 18:35]            Creatinine Trend:  SCr 1.78 [08-31 @ 18:35]  SCr 1.78 [08-31 @ 07:56]  SCr 1.92 [08-30 @ 20:26]  SCr 1.76 [08-30 @ 06:49]  SCr 1.86 [08-29 @ 06:05]          Iron 24, TIBC 395, %sat 6      [08-29-24 @ 06:04]  Ferritin 51      [08-29-24 @ 07:31]  HbA1c 8.9      [12-16-19 @ 08:15]    Free Light Chains: kappa 9.40, lambda 8.09, ratio = 1.16      [08-30 @ 06:49]

## 2024-08-31 NOTE — PROGRESS NOTE ADULT - SUBJECTIVE AND OBJECTIVE BOX
HPI:  63M PMHx HFrEF s/p ICD, HTN, T2DM, s/p LLE amputation   Pt presented w/ ICD shock 30min prior to arrival to ED. Pt reports he's been having progressively worsening GREWAL, orthopnea and SOB for the past few days.     I    T(C): 36.7 (08-31-24 @ 11:33), Max: 36.7 (08-31-24 @ 11:33)  HR: 93 (08-31-24 @ 16:38) (90 - 93)  BP: 99/60 (08-31-24 @ 16:38) (95/59 - 99/60)  RR: 18 (08-31-24 @ 11:33) (18 - 18)  SpO2: 93% (08-31-24 @ 11:33) (93% - 93%)      MEDICATIONS  (STANDING):  ALPRAZolam 0.25 milliGRAM(s) Oral once  ammonium lactate 12% Lotion 1 Application(s) Topical every 12 hours  aspirin enteric coated 81 milliGRAM(s) Oral daily  buMETAnide IVPB 4 milliGRAM(s) IV Intermittent <User Schedule>  buMETAnide IVPB 12 milliGRAM(s) IV Intermittent <User Schedule>  clopidogrel Tablet 75 milliGRAM(s) Oral daily  dextrose 5%. 1000 milliLiter(s) (100 mL/Hr) IV Continuous <Continuous>  dextrose 5%. 1000 milliLiter(s) (50 mL/Hr) IV Continuous <Continuous>  dextrose 50% Injectable 25 Gram(s) IV Push once  dextrose 50% Injectable 25 Gram(s) IV Push once  dextrose 50% Injectable 12.5 Gram(s) IV Push once  glucagon  Injectable 1 milliGRAM(s) IntraMuscular once  hydrALAZINE 25 milliGRAM(s) Oral three times a day  insulin glargine Injectable (LANTUS) 30 Unit(s) SubCutaneous at bedtime  insulin lispro (ADMELOG) corrective regimen sliding scale   SubCutaneous three times a day before meals  insulin lispro (ADMELOG) corrective regimen sliding scale   SubCutaneous at bedtime  insulin lispro Injectable (ADMELOG) 10 Unit(s) SubCutaneous three times a day before meals  iron sucrose IVPB 200 milliGRAM(s) IV Intermittent every 24 hours  levoFLOXacin  Tablet 500 milliGRAM(s) Oral every 24 hours  metoprolol succinate ER 25 milliGRAM(s) Oral daily  pantoprazole    Tablet 40 milliGRAM(s) Oral before breakfast  rosuvastatin 20 milliGRAM(s) Oral at bedtime  sacubitril 24 mG/valsartan 26 mG 1 Tablet(s) Oral every 12 hours  sodium chloride 0.65% Nasal 2 Spray(s) Both Nostrils four times a day  spironolactone 25 milliGRAM(s) Oral daily  tamsulosin 0.4 milliGRAM(s) Oral at bedtime    MEDICATIONS  (PRN):  acetaminophen     Tablet .. 650 milliGRAM(s) Oral every 6 hours PRN Temp greater or equal to 38C (100.4F), Mild Pain (1 - 3)  aluminum hydroxide/magnesium hydroxide/simethicone Suspension 30 milliLiter(s) Oral every 4 hours PRN Dyspepsia  dextrose Oral Gel 15 Gram(s) Oral once PRN Blood Glucose LESS THAN 70 milliGRAM(s)/deciliter  melatonin 3 milliGRAM(s) Oral at bedtime PRN Insomnia  ondansetron Injectable 4 milliGRAM(s) IV Push every 8 hours PRN Nausea and/or Vomiting  oxyCODONE    IR 5 milliGRAM(s) Oral daily PRN Severe Pain (7 - 10)            Hypertension      Heart failure with reduced ejection fraction      History of ischemic cardiomyopathy      History of COPD      H/O gastroesophageal reflux (GERD)      2019 novel coronavirus disease (COVID-19)      COVID-19 vaccine series completed      H/O vasectomy  20 yrs ago (2000)          Review of Systems:   CONSTITUTIONAL: No fever, weight loss, or fatigue  EYES: No eye pain, visual disturbances, or discharge  ENMT:  No difficulty hearing, tinnitus, vertigo; No sinus or throat pain  NECK: No pain or stiffness  BREASTS: No pain, masses, or nipple discharge  RESPIRATORY: No cough, wheezing, chills or hemoptysis; No shortness of breath  CARDIOVASCULAR: No chest pain, palpitations, dizziness, or leg swelling  GASTROINTESTINAL: No abdominal or epigastric pain. No nausea, vomiting, or hematemesis; No diarrhea or constipation. No melena or hematochezia.  GENITOURINARY: No dysuria, frequency, hematuria, or incontinence  NEUROLOGICAL: No headaches, memory loss, loss of strength, numbness, or tremors  SKIN: No itching, burning, rashes, or lesions   LYMPH NODES: No enlarged glands  ENDOCRINE: No heat or cold intolerance; No hair loss  MUSCULOSKELETAL: No joint pain or swelling; No muscle, back, or extremity pain  PSYCHIATRIC: No depression, anxiety, mood swings, or difficulty sleeping  HEME/LYMPH: No easy bruising, or bleeding gums  ALLERY AND IMMUNOLOGIC: No hives or eczema    Allergies    ceftriaxone (Rash)  doxepin (Other)  Zosyn (Pruritus)  vancomycin (Rash (Mild to Mod))    Intolerances        Social History:     FAMILY HISTORY:  Family history of COPD (chronic obstructive pulmonary disease) (Sibling)    Family history of cardiac disorder  Paternal        PHYSICAL EXAM:  GENERAL: NAD, well-developed  HEAD:  Atraumatic, Normocephalic  EYES: EOMI, PERRLA, conjunctiva and sclera clear  NECK: Supple, No JVD  CHEST/LUNG: Clear to auscultation bilaterally; No wheeze  HEART: Regular rate and rhythm; No murmurs, rubs, or gallops  ABDOMEN: Soft, Nontender, Nondistended; Bowel sounds present  EXTREMITIES:  2+ Peripheral Pulses, No clubbing, cyanosis, or edema  PSYCH: AAOx3  NEUROLOGY: non-focal  SKIN: No rashes or lesions                       141|98|46<147  3.5|28|1.78  8.9,2.3,--  08-31 @ 07:56  141|99|45<110  3.8|27|1.92  9.3,2.3,--  08-30 @ 20:26

## 2024-08-31 NOTE — PROGRESS NOTE ADULT - PROBLEM SELECTOR PLAN 3
Etiology: ischemic  BB: c/w toprol 25mg qd  ACE/ARB/ARNI: c/w entresto 24-26mg BID.  hold parameters SBP <90mmhg  MRA: c/w spironolactone 25mg qd  SGLT2: hold for now given possible EP intervention this admission, consider prior to dc  Device: S-ICD with inappropriate shocks, pending extraction and EV-ICD reimplant this admission  Afterload: increase hydralazine to 25mg TID    Diuresis: would start bumex gtt at 1 mg/hr during the day from 8a-8p (to allow for rest overnight).  Prior to starting gtt in the morning, would bolus dose 4mg IV bumex.  Check electrolytes twice daily while on gtt  Would give Iron sucrose x 5 days for iron deficiency in decompensated heart failure Etiology: ischemic  BB: c/w toprol 25mg qd  ACE/ARB/ARNI: c/w entresto 24-26mg BID.  hold parameters SBP <90mmhg  MRA: c/w spironolactone 25mg qd  SGLT2: hold for now given possible EP intervention this admission, consider prior to dc  Device: S-ICD with inappropriate shocks, pending extraction and EV-ICD reimplant this admission (anticipated for Tuesday)  Afterload: increased hydralazine to 25mg TID    Diuresis: would start bumex gtt at 1 mg/hr during the daytime hours from 8a-8p (to allow for rest overnight). Continue  Prior to starting gtt in the morning, would bolus dose 4mg IV bumex.  Check electrolytes twice daily while on gtt  Would give Iron sucrose x 5 days for iron deficiency in decompensated heart failure Etiology: ischemic  BB: c/w toprol 25mg qd  ACE/ARB/ARNI: c/w entresto 24-26mg BID.  hold parameters SBP <90mmhg  Afterload: on hydralazine to 25mg TID.  Can eventually increase Entresto to mid dose and wean off hydralazine once more euvolemic and renal function stabilizes.   MRA: c/w spironolactone 25mg qd  SGLT2: hold for now given possible EP intervention this admission, consider prior to dc  Device: S-ICD with inappropriate shocks, pending extraction and EV-ICD reimplant this admission (anticipated for Tuesday)      Diuresis: improving volume status.  Net -3.1L today.  Continue on  bumex gtt at 1 mg/hr during the daytime hours from 8a-8p (to allow for rest overnight).   Check electrolytes twice daily while on gtt  Would give Iron sucrose x 5 days for iron deficiency in decompensated heart failure    CardioMEMS discussed with patient.  He is increased but would like to do this as an outpatient.

## 2024-08-31 NOTE — PROGRESS NOTE ADULT - ASSESSMENT
63 y.o. male with hx of ischemic cardiomyopathy with HFrEF 30%, cardiac arrest with prior transvenous ICD extraction for infection and subsequent [employer] S-ICD implant 2020, CKD 3, HTN, T2 DM, COPD, LUIS ALFREDO, and LLE amputee who presented after ICD shock.    Interrogation of S-ICD with inappropriate shock due to oversensing.  Pending S-ICD extraction and EV-ICD reimplantation w/ EP.  HF consulted for decompensated heart failure.    TTE 8/26/24   1. Left ventricular cavity is severely dilated. Left ventricular wall thickness is normal. Left ventricular systolic function is severely decreased with an ejection fraction of 22 % by Bell's method of disks. Regional wall motion abnormalities present.   2. Multiple segmental abnormalities exist. See findings.   3. Normal right ventricular cavity size, with normal wall thickness, and probably normal right ventricular systolic function.   4. Estimated pulmonary artery systolic pressure is 30 mmHg.   5. Trace pericardial effusion.    [facility] 11/18/19  LM:  --  LM: Normal.  LAD:   --  Ostial LAD: There was a 100 % stenosis.  CX:   --  Distal circumflex: There was a 80 % stenosis.  RI:   --  Ramus intermedius: Normal. There was no significant restenosis.  RCA:   --  Mid RCA: There was a 50 % stenosis.

## 2024-08-31 NOTE — PROGRESS NOTE ADULT - SUBJECTIVE AND OBJECTIVE BOX
24H hour events: No acute overnight events    MEDICATIONS:  aspirin enteric coated 81 milliGRAM(s) Oral daily  buMETAnide IVPB 12 milliGRAM(s) IV Intermittent <User Schedule>  buMETAnide IVPB 4 milliGRAM(s) IV Intermittent <User Schedule>  clopidogrel Tablet 75 milliGRAM(s) Oral daily  hydrALAZINE 25 milliGRAM(s) Oral three times a day  metoprolol succinate ER 25 milliGRAM(s) Oral daily  sacubitril 24 mG/valsartan 26 mG 1 Tablet(s) Oral every 12 hours  spironolactone 25 milliGRAM(s) Oral daily  levoFLOXacin  Tablet 500 milliGRAM(s) Oral every 24 hours  acetaminophen     Tablet .. 650 milliGRAM(s) Oral every 6 hours PRN  ALPRAZolam 0.25 milliGRAM(s) Oral once  melatonin 3 milliGRAM(s) Oral at bedtime PRN  ondansetron Injectable 4 milliGRAM(s) IV Push every 8 hours PRN  oxyCODONE    IR 5 milliGRAM(s) Oral daily PRN  aluminum hydroxide/magnesium hydroxide/simethicone Suspension 30 milliLiter(s) Oral every 4 hours PRN  pantoprazole    Tablet 40 milliGRAM(s) Oral before breakfast  dextrose 50% Injectable 25 Gram(s) IV Push once  dextrose 50% Injectable 12.5 Gram(s) IV Push once  dextrose 50% Injectable 25 Gram(s) IV Push once  dextrose Oral Gel 15 Gram(s) Oral once PRN  glucagon  Injectable 1 milliGRAM(s) IntraMuscular once  insulin glargine Injectable (LANTUS) 30 Unit(s) SubCutaneous at bedtime  insulin lispro (ADMELOG) corrective regimen sliding scale   SubCutaneous three times a day before meals  insulin lispro (ADMELOG) corrective regimen sliding scale   SubCutaneous at bedtime  insulin lispro Injectable (ADMELOG) 10 Unit(s) SubCutaneous three times a day before meals  rosuvastatin 20 milliGRAM(s) Oral at bedtime  ammonium lactate 12% Lotion 1 Application(s) Topical every 12 hours  dextrose 5%. 1000 milliLiter(s) IV Continuous <Continuous>  dextrose 5%. 1000 milliLiter(s) IV Continuous <Continuous>  iron sucrose IVPB 200 milliGRAM(s) IV Intermittent every 24 hours  sodium chloride 0.65% Nasal 2 Spray(s) Both Nostrils four times a day  tamsulosin 0.4 milliGRAM(s) Oral at bedtime    REVIEW OF SYSTEMS:  See HPI, otherwise ROS negative.    PHYSICAL EXAM:  T(C): 36.8 (08-31-24 @ 04:38), Max: 36.8 (08-31-24 @ 04:38)  HR: 102 (08-31-24 @ 04:38) (95 - 102)  BP: 101/61 (08-31-24 @ 04:38) (94/58 - 101/61)  RR: 18 (08-31-24 @ 04:38) (18 - 18)  SpO2: 92% (08-31-24 @ 04:38) (92% - 96%)  Wt(kg): --  I&O's Summary    30 Aug 2024 07:01  -  31 Aug 2024 07:00  --------------------------------------------------------  IN: 775 mL / OUT: 3900 mL / NET: -3125 mL    31 Aug 2024 07:01  -  31 Aug 2024 09:33  --------------------------------------------------------  IN: 450 mL / OUT: 800 mL / NET: -350 mL        Appearance: Alert. NAD	  HEENT:   NC/AT	  Cardiovascular: +S1S2 RRR no m/g/r  Respiratory: CTA B/L	  Psychiatry: A & O x 3, Mood & affect appropriate  Gastrointestinal:  Soft, NT.ND., + BS	  Skin: No rashes	  Neurologic: Non-focal  Extremities: No edema BLE  Vascular: Peripheral pulses palpable 2+ bilaterally      LABS:	 	      08-31    141  |  98  |  46<H>  ----------------------------<  147<H>  3.5   |  28  |  1.78<H>  08-30    141  |  99  |  45<H>  ----------------------------<  110<H>  3.8   |  27  |  1.92<H>    Ca    8.9      31 Aug 2024 07:56  Ca    9.3      30 Aug 2024 20:26  Mg     2.3     08-31  Mg     2.3     08-30    TELEMETRY: SR 90s

## 2024-08-31 NOTE — PROGRESS NOTE ADULT - SUBJECTIVE AND OBJECTIVE BOX
Preston Parker MD  Cardiology Fellow  [phone]  All Cardiology service information can be found 24/7 on [email or web address], password: [email or web address]    Patient seen and examined at bedside.  AF HR 90s-100s BP 90s-100s/50s-60s satting 92% Ra  Net neg 3.125L   141/38 | 99/27 | 45/1.92 (from 1.76)< 110 Mg 2.3     Review Of Systems: No chest pain, shortness of breath, or palpitations            Current Meds:  acetaminophen     Tablet .. 650 milliGRAM(s) Oral every 6 hours PRN  ALPRAZolam 0.25 milliGRAM(s) Oral once  aluminum hydroxide/magnesium hydroxide/simethicone Suspension 30 milliLiter(s) Oral every 4 hours PRN  ammonium lactate 12% Lotion 1 Application(s) Topical every 12 hours  aspirin enteric coated 81 milliGRAM(s) Oral daily  buMETAnide IVPB 12 milliGRAM(s) IV Intermittent <User Schedule>  buMETAnide IVPB 4 milliGRAM(s) IV Intermittent <User Schedule>  clopidogrel Tablet 75 milliGRAM(s) Oral daily  dextrose 5%. 1000 milliLiter(s) IV Continuous <Continuous>  dextrose 5%. 1000 milliLiter(s) IV Continuous <Continuous>  dextrose 50% Injectable 25 Gram(s) IV Push once  dextrose 50% Injectable 12.5 Gram(s) IV Push once  dextrose 50% Injectable 25 Gram(s) IV Push once  dextrose Oral Gel 15 Gram(s) Oral once PRN  glucagon  Injectable 1 milliGRAM(s) IntraMuscular once  hydrALAZINE 25 milliGRAM(s) Oral three times a day  insulin glargine Injectable (LANTUS) 30 Unit(s) SubCutaneous at bedtime  insulin lispro (ADMELOG) corrective regimen sliding scale   SubCutaneous three times a day before meals  insulin lispro (ADMELOG) corrective regimen sliding scale   SubCutaneous at bedtime  insulin lispro Injectable (ADMELOG) 10 Unit(s) SubCutaneous three times a day before meals  iron sucrose IVPB 200 milliGRAM(s) IV Intermittent every 24 hours  levoFLOXacin  Tablet 500 milliGRAM(s) Oral every 24 hours  melatonin 3 milliGRAM(s) Oral at bedtime PRN  metoprolol succinate ER 25 milliGRAM(s) Oral daily  ondansetron Injectable 4 milliGRAM(s) IV Push every 8 hours PRN  oxyCODONE    IR 5 milliGRAM(s) Oral daily PRN  pantoprazole    Tablet 40 milliGRAM(s) Oral before breakfast  rosuvastatin 20 milliGRAM(s) Oral at bedtime  sacubitril 24 mG/valsartan 26 mG 1 Tablet(s) Oral every 12 hours  sodium chloride 0.65% Nasal 2 Spray(s) Both Nostrils four times a day  spironolactone 25 milliGRAM(s) Oral daily  tamsulosin 0.4 milliGRAM(s) Oral at bedtime      Vitals:  T(F): 98.2 (08-31), Max: 98.2 (08-31)  HR: 102 (08-31) (95 - 102)  BP: 101/61 (08-31) (94/58 - 101/61)  RR: 18 (08-31)  SpO2: 92% (08-31)  I&O's Summary    30 Aug 2024 07:01  -  31 Aug 2024 07:00  --------------------------------------------------------  IN: 775 mL / OUT: 3900 mL / NET: -3125 mL      GENERAL: NAD  HEAD: Atraumatic, Normocephalic.  EYES: EOMI, PERRLA, conjunctiva and sclera clear.  ENT: Moist mucous membranes.  NECK: Supple, + JVD.  CHEST/LUNG: Diminished breath sounds at bases bilaterally  HEART: Regular rate and rhythm; No murmurs, rubs, or gallops.  ABDOMEN: Bowel sounds present; Mild abdominal distension  EXTREMITIES:  +LLE amputation. Chronic venous stasis changes of RLE. +pitting edema to sacrum  PSYCH: Normal affect.  SKIN: No rashes or lesions.    08-30    141  |  99  |  45<H>  ----------------------------<  110<H>  3.8   |  27  |  1.92<H>    Ca    9.3      30 Aug 2024 20:26  Mg     2.3     08-30        CARDIAC MARKERS ( 24 Aug 2024 21:33 )  74 ng/L / x     / x     / x     / x     / x      CARDIAC MARKERS ( 24 Aug 2024 20:03 )  77 ng/L / x     / x     / x     / x     / x         Preston Parker MD  Cardiology Fellow  [phone]  All Cardiology service information can be found 24/7 on [email or web address], password: [email or web address]    Patient seen and examined at bedside.  Has questions related to timing for ICD and cardiomems  AF HR 90s-100s BP 90s-100s/50s-60s satting 92% Ra  Net neg 3.125L   141/38 | 99/27 | 45/1.92 (from 1.76)< 110 Mg 2.3     Review Of Systems: No chest pain, shortness of breath, or palpitations            Current Meds:  acetaminophen     Tablet .. 650 milliGRAM(s) Oral every 6 hours PRN  ALPRAZolam 0.25 milliGRAM(s) Oral once  aluminum hydroxide/magnesium hydroxide/simethicone Suspension 30 milliLiter(s) Oral every 4 hours PRN  ammonium lactate 12% Lotion 1 Application(s) Topical every 12 hours  aspirin enteric coated 81 milliGRAM(s) Oral daily  buMETAnide IVPB 12 milliGRAM(s) IV Intermittent <User Schedule>  buMETAnide IVPB 4 milliGRAM(s) IV Intermittent <User Schedule>  clopidogrel Tablet 75 milliGRAM(s) Oral daily  dextrose 5%. 1000 milliLiter(s) IV Continuous <Continuous>  dextrose 5%. 1000 milliLiter(s) IV Continuous <Continuous>  dextrose 50% Injectable 25 Gram(s) IV Push once  dextrose 50% Injectable 12.5 Gram(s) IV Push once  dextrose 50% Injectable 25 Gram(s) IV Push once  dextrose Oral Gel 15 Gram(s) Oral once PRN  glucagon  Injectable 1 milliGRAM(s) IntraMuscular once  hydrALAZINE 25 milliGRAM(s) Oral three times a day  insulin glargine Injectable (LANTUS) 30 Unit(s) SubCutaneous at bedtime  insulin lispro (ADMELOG) corrective regimen sliding scale   SubCutaneous three times a day before meals  insulin lispro (ADMELOG) corrective regimen sliding scale   SubCutaneous at bedtime  insulin lispro Injectable (ADMELOG) 10 Unit(s) SubCutaneous three times a day before meals  iron sucrose IVPB 200 milliGRAM(s) IV Intermittent every 24 hours  levoFLOXacin  Tablet 500 milliGRAM(s) Oral every 24 hours  melatonin 3 milliGRAM(s) Oral at bedtime PRN  metoprolol succinate ER 25 milliGRAM(s) Oral daily  ondansetron Injectable 4 milliGRAM(s) IV Push every 8 hours PRN  oxyCODONE    IR 5 milliGRAM(s) Oral daily PRN  pantoprazole    Tablet 40 milliGRAM(s) Oral before breakfast  rosuvastatin 20 milliGRAM(s) Oral at bedtime  sacubitril 24 mG/valsartan 26 mG 1 Tablet(s) Oral every 12 hours  sodium chloride 0.65% Nasal 2 Spray(s) Both Nostrils four times a day  spironolactone 25 milliGRAM(s) Oral daily  tamsulosin 0.4 milliGRAM(s) Oral at bedtime      Vitals:  T(F): 98.2 (08-31), Max: 98.2 (08-31)  HR: 102 (08-31) (95 - 102)  BP: 101/61 (08-31) (94/58 - 101/61)  RR: 18 (08-31)  SpO2: 92% (08-31)  I&O's Summary    30 Aug 2024 07:01  -  31 Aug 2024 07:00  --------------------------------------------------------  IN: 775 mL / OUT: 3900 mL / NET: -3125 mL      GENERAL: NAD  HEAD: Atraumatic, Normocephalic.  EYES: EOMI, PERRLA, conjunctiva and sclera clear.  ENT: Moist mucous membranes.  NECK: Supple, + JVD to mid neck  CHEST/LUNG: Diminished breath sounds at bases bilaterally  HEART: Regular rate and rhythm; No murmurs, rubs, or gallops.  ABDOMEN: Bowel sounds present; Mild abdominal distension  EXTREMITIES:  +LLE amputation. Chronic venous stasis changes of RLE. +pitting edema to upper thigh  PSYCH: Normal affect.  SKIN: No rashes or lesions.    08-30    141  |  99  |  45<H>  ----------------------------<  110<H>  3.8   |  27  |  1.92<H>    Ca    9.3      30 Aug 2024 20:26  Mg     2.3     08-30        CARDIAC MARKERS ( 24 Aug 2024 21:33 )  74 ng/L / x     / x     / x     / x     / x      CARDIAC MARKERS ( 24 Aug 2024 20:03 )  77 ng/L / x     / x     / x     / x     / x

## 2024-09-01 LAB
ANION GAP SERPL CALC-SCNC: 12 MMOL/L — SIGNIFICANT CHANGE UP (ref 5–17)
ANION GAP SERPL CALC-SCNC: 15 MMOL/L — SIGNIFICANT CHANGE UP (ref 5–17)
BUN SERPL-MCNC: 46 MG/DL — HIGH (ref 7–23)
BUN SERPL-MCNC: 48 MG/DL — HIGH (ref 7–23)
CALCIUM SERPL-MCNC: 9 MG/DL — SIGNIFICANT CHANGE UP (ref 8.4–10.5)
CALCIUM SERPL-MCNC: 9.1 MG/DL — SIGNIFICANT CHANGE UP (ref 8.4–10.5)
CHLORIDE SERPL-SCNC: 100 MMOL/L — SIGNIFICANT CHANGE UP (ref 96–108)
CHLORIDE SERPL-SCNC: 99 MMOL/L — SIGNIFICANT CHANGE UP (ref 96–108)
CO2 SERPL-SCNC: 26 MMOL/L — SIGNIFICANT CHANGE UP (ref 22–31)
CO2 SERPL-SCNC: 28 MMOL/L — SIGNIFICANT CHANGE UP (ref 22–31)
CREAT SERPL-MCNC: 1.7 MG/DL — HIGH (ref 0.5–1.3)
CREAT SERPL-MCNC: 1.81 MG/DL — HIGH (ref 0.5–1.3)
EGFR: 42 ML/MIN/1.73M2 — LOW
EGFR: 45 ML/MIN/1.73M2 — LOW
GLUCOSE BLDC GLUCOMTR-MCNC: 130 MG/DL — HIGH (ref 70–99)
GLUCOSE BLDC GLUCOMTR-MCNC: 151 MG/DL — HIGH (ref 70–99)
GLUCOSE BLDC GLUCOMTR-MCNC: 165 MG/DL — HIGH (ref 70–99)
GLUCOSE BLDC GLUCOMTR-MCNC: 188 MG/DL — HIGH (ref 70–99)
GLUCOSE SERPL-MCNC: 149 MG/DL — HIGH (ref 70–99)
GLUCOSE SERPL-MCNC: 204 MG/DL — HIGH (ref 70–99)
MAGNESIUM SERPL-MCNC: 2.3 MG/DL — SIGNIFICANT CHANGE UP (ref 1.6–2.6)
MAGNESIUM SERPL-MCNC: 2.5 MG/DL — SIGNIFICANT CHANGE UP (ref 1.6–2.6)
POTASSIUM SERPL-MCNC: 3.6 MMOL/L — SIGNIFICANT CHANGE UP (ref 3.5–5.3)
POTASSIUM SERPL-MCNC: 3.8 MMOL/L — SIGNIFICANT CHANGE UP (ref 3.5–5.3)
POTASSIUM SERPL-SCNC: 3.6 MMOL/L — SIGNIFICANT CHANGE UP (ref 3.5–5.3)
POTASSIUM SERPL-SCNC: 3.8 MMOL/L — SIGNIFICANT CHANGE UP (ref 3.5–5.3)
SODIUM SERPL-SCNC: 139 MMOL/L — SIGNIFICANT CHANGE UP (ref 135–145)
SODIUM SERPL-SCNC: 141 MMOL/L — SIGNIFICANT CHANGE UP (ref 135–145)

## 2024-09-01 PROCEDURE — 99233 SBSQ HOSP IP/OBS HIGH 50: CPT | Mod: GC

## 2024-09-01 RX ORDER — OXYCODONE HYDROCHLORIDE 5 MG/1
5 TABLET ORAL DAILY
Refills: 0 | Status: DISCONTINUED | OUTPATIENT
Start: 2024-09-01 | End: 2024-09-03

## 2024-09-01 RX ADMIN — Medication 1 APPLICATION(S): at 17:35

## 2024-09-01 RX ADMIN — METOPROLOL TARTRATE 25 MILLIGRAM(S): 100 TABLET ORAL at 05:47

## 2024-09-01 RX ADMIN — Medication 75 MILLIGRAM(S): at 12:18

## 2024-09-01 RX ADMIN — BUMETANIDE 132 MILLIGRAM(S): 2 TABLET ORAL at 07:30

## 2024-09-01 RX ADMIN — Medication 1: at 12:17

## 2024-09-01 RX ADMIN — Medication 10 UNIT(S): at 17:35

## 2024-09-01 RX ADMIN — INSULIN GLARGINE 30 UNIT(S): 100 INJECTION, SOLUTION SUBCUTANEOUS at 21:54

## 2024-09-01 RX ADMIN — SACUBITRIL AND VALSARTAN 1 TABLET(S): 49; 51 TABLET, FILM COATED ORAL at 05:47

## 2024-09-01 RX ADMIN — BUMETANIDE 9 MILLIGRAM(S): 2 TABLET ORAL at 07:59

## 2024-09-01 RX ADMIN — Medication 1: at 17:35

## 2024-09-01 RX ADMIN — SPIRONOLACTONE 25 MILLIGRAM(S): 25 TABLET, FILM COATED ORAL at 05:47

## 2024-09-01 RX ADMIN — TAMSULOSIN HYDROCHLORIDE 0.4 MILLIGRAM(S): 0.4 CAPSULE ORAL at 21:54

## 2024-09-01 RX ADMIN — Medication 40 MILLIGRAM(S): at 05:47

## 2024-09-01 RX ADMIN — Medication 10 UNIT(S): at 07:59

## 2024-09-01 RX ADMIN — ROSUVASTATIN CALCIUM 20 MILLIGRAM(S): 10 TABLET ORAL at 22:30

## 2024-09-01 RX ADMIN — Medication 2 SPRAY(S): at 05:48

## 2024-09-01 RX ADMIN — OXYCODONE HYDROCHLORIDE 5 MILLIGRAM(S): 5 TABLET ORAL at 21:54

## 2024-09-01 RX ADMIN — Medication 1 APPLICATION(S): at 05:48

## 2024-09-01 RX ADMIN — IRON SUCROSE 146.67 MILLIGRAM(S): 20 INJECTION, SOLUTION INTRAVENOUS at 21:54

## 2024-09-01 RX ADMIN — Medication 25 MILLIGRAM(S): at 14:04

## 2024-09-01 RX ADMIN — SACUBITRIL AND VALSARTAN 1 TABLET(S): 49; 51 TABLET, FILM COATED ORAL at 17:40

## 2024-09-01 RX ADMIN — Medication 25 MILLIGRAM(S): at 21:54

## 2024-09-01 RX ADMIN — Medication 81 MILLIGRAM(S): at 12:18

## 2024-09-01 RX ADMIN — Medication 25 MILLIGRAM(S): at 05:47

## 2024-09-01 RX ADMIN — Medication 10 UNIT(S): at 12:18

## 2024-09-01 RX ADMIN — Medication 1: at 07:59

## 2024-09-01 RX ADMIN — OXYCODONE HYDROCHLORIDE 5 MILLIGRAM(S): 5 TABLET ORAL at 02:20

## 2024-09-01 NOTE — PROGRESS NOTE ADULT - SUBJECTIVE AND OBJECTIVE BOX
HPI:  63M PMHx HFrEF s/p ICD, HTN, T2DM, s/p LLE amputation   Pt presented w/ ICD shock 30min prior to arrival to ED. Pt reports he's been having progressively worsening GREWAL, orthopnea and SOB for the past few days.     T(C): 36.6 (09-01-24 @ 11:25), Max: 36.6 (09-01-24 @ 11:25)  HR: 99 (09-01-24 @ 16:18) (93 - 99)  BP: 101/64 (09-01-24 @ 16:18) (97/62 - 101/64)  RR: 18 (09-01-24 @ 16:18) (18 - 18)  SpO2: 96% (09-01-24 @ 16:18) (96% - 97%)        MEDICATIONS  (STANDING):  ALPRAZolam 0.25 milliGRAM(s) Oral once  ammonium lactate 12% Lotion 1 Application(s) Topical every 12 hours  aspirin enteric coated 81 milliGRAM(s) Oral daily  buMETAnide IVPB 4 milliGRAM(s) IV Intermittent <User Schedule>  buMETAnide IVPB 12 milliGRAM(s) IV Intermittent <User Schedule>  clopidogrel Tablet 75 milliGRAM(s) Oral daily  dextrose 5%. 1000 milliLiter(s) (50 mL/Hr) IV Continuous <Continuous>  dextrose 5%. 1000 milliLiter(s) (100 mL/Hr) IV Continuous <Continuous>  dextrose 50% Injectable 25 Gram(s) IV Push once  dextrose 50% Injectable 25 Gram(s) IV Push once  dextrose 50% Injectable 12.5 Gram(s) IV Push once  glucagon  Injectable 1 milliGRAM(s) IntraMuscular once  hydrALAZINE 25 milliGRAM(s) Oral three times a day  insulin glargine Injectable (LANTUS) 30 Unit(s) SubCutaneous at bedtime  insulin lispro (ADMELOG) corrective regimen sliding scale   SubCutaneous three times a day before meals  insulin lispro (ADMELOG) corrective regimen sliding scale   SubCutaneous at bedtime  insulin lispro Injectable (ADMELOG) 10 Unit(s) SubCutaneous three times a day before meals  iron sucrose IVPB 200 milliGRAM(s) IV Intermittent every 24 hours  levoFLOXacin  Tablet 500 milliGRAM(s) Oral every 24 hours  metoprolol succinate ER 25 milliGRAM(s) Oral daily  pantoprazole    Tablet 40 milliGRAM(s) Oral before breakfast  rosuvastatin 20 milliGRAM(s) Oral at bedtime  sacubitril 24 mG/valsartan 26 mG 1 Tablet(s) Oral every 12 hours  sodium chloride 0.65% Nasal 2 Spray(s) Both Nostrils four times a day  spironolactone 25 milliGRAM(s) Oral daily  tamsulosin 0.4 milliGRAM(s) Oral at bedtime    MEDICATIONS  (PRN):  acetaminophen     Tablet .. 650 milliGRAM(s) Oral every 6 hours PRN Temp greater or equal to 38C (100.4F), Mild Pain (1 - 3)  aluminum hydroxide/magnesium hydroxide/simethicone Suspension 30 milliLiter(s) Oral every 4 hours PRN Dyspepsia  dextrose Oral Gel 15 Gram(s) Oral once PRN Blood Glucose LESS THAN 70 milliGRAM(s)/deciliter  melatonin 3 milliGRAM(s) Oral at bedtime PRN Insomnia  ondansetron Injectable 4 milliGRAM(s) IV Push every 8 hours PRN Nausea and/or Vomiting  oxyCODONE    IR 5 milliGRAM(s) Oral daily PRN Severe Pain (7 - 10)      Hypertension      Heart failure with reduced ejection fraction      History of ischemic cardiomyopathy      History of COPD      H/O gastroesophageal reflux (GERD)      2019 novel coronavirus disease (COVID-19)      COVID-19 vaccine series completed      H/O vasectomy  20 yrs ago (2000)          Review of Systems:   CONSTITUTIONAL: No fever, weight loss, or fatigue  EYES: No eye pain, visual disturbances, or discharge  ENMT:  No difficulty hearing, tinnitus, vertigo; No sinus or throat pain  NECK: No pain or stiffness  BREASTS: No pain, masses, or nipple discharge  RESPIRATORY: No cough, wheezing, chills or hemoptysis; No shortness of breath  CARDIOVASCULAR: No chest pain, palpitations, dizziness, or leg swelling  GASTROINTESTINAL: No abdominal or epigastric pain. No nausea, vomiting, or hematemesis; No diarrhea or constipation. No melena or hematochezia.  GENITOURINARY: No dysuria, frequency, hematuria, or incontinence  NEUROLOGICAL: No headaches, memory loss, loss of strength, numbness, or tremors  SKIN: No itching, burning, rashes, or lesions   LYMPH NODES: No enlarged glands  ENDOCRINE: No heat or cold intolerance; No hair loss  MUSCULOSKELETAL: No joint pain or swelling; No muscle, back, or extremity pain  PSYCHIATRIC: No depression, anxiety, mood swings, or difficulty sleeping  HEME/LYMPH: No easy bruising, or bleeding gums  ALLERY AND IMMUNOLOGIC: No hives or eczema    Allergies    ceftriaxone (Rash)  doxepin (Other)  Zosyn (Pruritus)  vancomycin (Rash (Mild to Mod))    Intolerances        Social History:     FAMILY HISTORY:  Family history of COPD (chronic obstructive pulmonary disease) (Sibling)    Family history of cardiac disorder  Paternal        PHYSICAL EXAM:  GENERAL: NAD, well-developed  HEAD:  Atraumatic, Normocephalic  EYES: EOMI, PERRLA, conjunctiva and sclera clear  NECK: Supple, No JVD  CHEST/LUNG: Clear to auscultation bilaterally; No wheeze  HEART: Regular rate and rhythm; No murmurs, rubs, or gallops  ABDOMEN: Soft, Nontender, Nondistended; Bowel sounds present  EXTREMITIES:  2+ Peripheral Pulses, No clubbing, cyanosis, or edema  PSYCH: AAOx3  NEUROLOGY: non-focal  SKIN: No rashes or lesions                  139|99|48<204  3.8|28|1.81  9.1,2.3,--  09-01 @ 18:14  141|100|46<149  3.6|26|1.70  9.0,2.5,-- 09-01 @ 07:37

## 2024-09-01 NOTE — PROGRESS NOTE ADULT - SUBJECTIVE AND OBJECTIVE BOX
[facility]   [phone]  RENAL FOLLOW UP NOTE  --------------------------------------------------------------------------------  Chief Complaint:    24 hour events/subjective:    seen earlier   states breathing is better and edema is better     PAST HISTORY  --------------------------------------------------------------------------------  No significant changes to PMH, PSH, FHx, SHx, unless otherwise noted    ALLERGIES & MEDICATIONS  --------------------------------------------------------------------------------  Allergies    ceftriaxone (Rash)  doxepin (Other)  Zosyn (Pruritus)  vancomycin (Rash (Mild to Mod))    Intolerances      Standing Inpatient Medications  ALPRAZolam 0.25 milliGRAM(s) Oral once  ammonium lactate 12% Lotion 1 Application(s) Topical every 12 hours  aspirin enteric coated 81 milliGRAM(s) Oral daily  buMETAnide IVPB 12 milliGRAM(s) IV Intermittent <User Schedule>  buMETAnide IVPB 4 milliGRAM(s) IV Intermittent <User Schedule>  clopidogrel Tablet 75 milliGRAM(s) Oral daily  dextrose 5%. 1000 milliLiter(s) IV Continuous <Continuous>  dextrose 5%. 1000 milliLiter(s) IV Continuous <Continuous>  dextrose 50% Injectable 25 Gram(s) IV Push once  dextrose 50% Injectable 25 Gram(s) IV Push once  dextrose 50% Injectable 12.5 Gram(s) IV Push once  glucagon  Injectable 1 milliGRAM(s) IntraMuscular once  hydrALAZINE 25 milliGRAM(s) Oral three times a day  insulin glargine Injectable (LANTUS) 30 Unit(s) SubCutaneous at bedtime  insulin lispro (ADMELOG) corrective regimen sliding scale   SubCutaneous three times a day before meals  insulin lispro (ADMELOG) corrective regimen sliding scale   SubCutaneous at bedtime  insulin lispro Injectable (ADMELOG) 10 Unit(s) SubCutaneous three times a day before meals  levoFLOXacin  Tablet 500 milliGRAM(s) Oral every 24 hours  metoprolol succinate ER 25 milliGRAM(s) Oral daily  pantoprazole    Tablet 40 milliGRAM(s) Oral before breakfast  rosuvastatin 20 milliGRAM(s) Oral at bedtime  sacubitril 24 mG/valsartan 26 mG 1 Tablet(s) Oral every 12 hours  sodium chloride 0.65% Nasal 2 Spray(s) Both Nostrils four times a day  spironolactone 25 milliGRAM(s) Oral daily  tamsulosin 0.4 milliGRAM(s) Oral at bedtime    PRN Inpatient Medications  acetaminophen     Tablet .. 650 milliGRAM(s) Oral every 6 hours PRN  aluminum hydroxide/magnesium hydroxide/simethicone Suspension 30 milliLiter(s) Oral every 4 hours PRN  dextrose Oral Gel 15 Gram(s) Oral once PRN  melatonin 3 milliGRAM(s) Oral at bedtime PRN  ondansetron Injectable 4 milliGRAM(s) IV Push every 8 hours PRN      REVIEW OF SYSTEMS  --------------------------------------------------------------------------------    Gen: denies  fevers/chills,  CVS: denies chest pain/palpitations  Resp: denies SOB/Cough  GI: Denies N/V/Abd pain  : Denies dysuria/ or decrease urination       VITALS/PHYSICAL EXAM  --------------------------------------------------------------------------------  T(C): 36.7 (09-01-24 @ 21:32), Max: 36.7 (09-01-24 @ 05:06)  HR: 103 (09-01-24 @ 21:32) (92 - 103)  BP: 99/63 (09-01-24 @ 21:32) (97/62 - 102/63)  RR: 18 (09-01-24 @ 21:32) (18 - 18)  SpO2: 96% (09-01-24 @ 21:32) (94% - 97%)  Wt(kg): --        08-31-24 @ 07:01  -  09-01-24 @ 07:00  --------------------------------------------------------  IN: 850 mL / OUT: 1750 mL / NET: -900 mL    09-01-24 @ 07:01  -  09-01-24 @ 23:03  --------------------------------------------------------  IN: 810 mL / OUT: 3530 mL / NET: -2720 mL      Physical Exam:  	    	Gen: Non toxic comfortable appearing   	Pulm: decrease bs  no rales or ronchi or wheezing  	CV: +mild JVD, RRR, S1S2; no rub  	Back: No CVA tenderness; no sacral edema  	Abd: +BS, soft, nontender/+distended, +abd wall edema  	: No suprapubic tenderness  	UE: Warm, no cyanosis  no clubbing,  no edema;  	LE: Warm, no cyanosis  no clubbing, RLE chronic venous stasis, +edema, LLE BKA decreased   	Neuro: alert and oriented. speech coherent       LABS/STUDIES  --------------------------------------------------------------------------------    139  |  99  |  48  ----------------------------<  204      [09-01-24 @ 18:14]  3.8   |  28  |  1.81        Ca     9.1     [09-01-24 @ 18:14]      Mg     2.3     [09-01-24 @ 18:14]            Creatinine Trend:  SCr 1.81 [09-01 @ 18:14]  SCr 1.70 [09-01 @ 07:37]  SCr 1.78 [08-31 @ 18:35]  SCr 1.78 [08-31 @ 07:56]  SCr 1.92 [08-30 @ 20:26]  a       Iron 24, TIBC 395, %sat 6      [08-29-24 @ 06:04]  Ferritin 51      [08-29-24 @ 07:31]  HbA1c 8.9      [12-16-19 @ 08:15]    Free Light Chains: kappa 9.40, lambda 8.09, ratio = 1.16      [08-30 @ 06:49]

## 2024-09-01 NOTE — PROGRESS NOTE ADULT - ASSESSMENT
63 year old Male with PMHx HFrEF s/p ICD, HTN, T2DM, s/p LLE amputation in [place] ~2 months ago. Pt presented to ED for ICD shock.        1- CKDIII  2- CHF  3- DM2  4- anemia  5- cellulitis       per chart review, creatinine ranges higher   creatinine is steady  creatinine expected to rise as fluid status improves  dc bumex drip change to po bumex 4 mg daily  aldactone 25 mg daily for CHF  non oliguric  ICD deactivated, EP following for possible removal and re-implant tuesday  anemia, trend hgb venofer infusion  check guaiac   levofloxacin 500 mg daily for cellulitis   continue entresto 24/46 mg bid   trend bp  strict I/O  daily standing weight   trend creatinine and electrolytes daily

## 2024-09-01 NOTE — PROGRESS NOTE ADULT - SUBJECTIVE AND OBJECTIVE BOX
Preston Parker MD  Cardiology Fellow  [phone]  All Cardiology service information can be found 24/7 on [email or web address], password: [email or web address]    Patient seen and examined at bedside.  AF HR 90s BP [id number] satting 94% Ra  Net neg 900cc   141/3.6 | 100/26 | 46/1.76 < 149 Mg 2.5  STill overloaded. Would not like to stay longer for cardiomems as he has a grandchild he would like to see     Review Of Systems: No chest pain, shortness of breath, or palpitations            Current Meds:  acetaminophen     Tablet .. 650 milliGRAM(s) Oral every 6 hours PRN  ALPRAZolam 0.25 milliGRAM(s) Oral once  aluminum hydroxide/magnesium hydroxide/simethicone Suspension 30 milliLiter(s) Oral every 4 hours PRN  ammonium lactate 12% Lotion 1 Application(s) Topical every 12 hours  aspirin enteric coated 81 milliGRAM(s) Oral daily  buMETAnide IVPB 4 milliGRAM(s) IV Intermittent <User Schedule>  buMETAnide IVPB 12 milliGRAM(s) IV Intermittent <User Schedule>  clopidogrel Tablet 75 milliGRAM(s) Oral daily  dextrose 5%. 1000 milliLiter(s) IV Continuous <Continuous>  dextrose 5%. 1000 milliLiter(s) IV Continuous <Continuous>  dextrose 50% Injectable 25 Gram(s) IV Push once  dextrose 50% Injectable 25 Gram(s) IV Push once  dextrose 50% Injectable 12.5 Gram(s) IV Push once  dextrose Oral Gel 15 Gram(s) Oral once PRN  glucagon  Injectable 1 milliGRAM(s) IntraMuscular once  hydrALAZINE 25 milliGRAM(s) Oral three times a day  insulin glargine Injectable (LANTUS) 30 Unit(s) SubCutaneous at bedtime  insulin lispro (ADMELOG) corrective regimen sliding scale   SubCutaneous three times a day before meals  insulin lispro (ADMELOG) corrective regimen sliding scale   SubCutaneous at bedtime  insulin lispro Injectable (ADMELOG) 10 Unit(s) SubCutaneous three times a day before meals  iron sucrose IVPB 200 milliGRAM(s) IV Intermittent every 24 hours  levoFLOXacin  Tablet 500 milliGRAM(s) Oral every 24 hours  melatonin 3 milliGRAM(s) Oral at bedtime PRN  metoprolol succinate ER 25 milliGRAM(s) Oral daily  ondansetron Injectable 4 milliGRAM(s) IV Push every 8 hours PRN  oxyCODONE    IR 5 milliGRAM(s) Oral daily PRN  pantoprazole    Tablet 40 milliGRAM(s) Oral before breakfast  rosuvastatin 20 milliGRAM(s) Oral at bedtime  sacubitril 24 mG/valsartan 26 mG 1 Tablet(s) Oral every 12 hours  sodium chloride 0.65% Nasal 2 Spray(s) Both Nostrils four times a day  spironolactone 25 milliGRAM(s) Oral daily  tamsulosin 0.4 milliGRAM(s) Oral at bedtime      Vitals:  T(F): 98 (09-01), Max: 98 (08-31)  HR: 92 (09-01) (90 - 96)  BP: 102/63 (09-01) (95/59 - 102/63)  RR: 18 (09-01)  SpO2: 94% (09-01)  I&O's Summary    31 Aug 2024 07:01  -  01 Sep 2024 07:00  --------------------------------------------------------  IN: 850 mL / OUT: 1750 mL / NET: -900 mL    01 Sep 2024 07:01  -  01 Sep 2024 10:47  --------------------------------------------------------  IN: 410 mL / OUT: 700 mL / NET: -290 mL      GENERAL: NAD  HEAD: Atraumatic, Normocephalic.  EYES: EOMI, PERRLA, conjunctiva and sclera clear.  ENT: Moist mucous membranes.  NECK: Supple, + JVD to mid neck  CHEST/LUNG: Diminished breath sounds at bases bilaterally  HEART: Regular rate and rhythm; No murmurs, rubs, or gallops.  ABDOMEN: Bowel sounds present; Mild abdominal distension  EXTREMITIES:  +LLE amputation. Chronic venous stasis changes of RLE. +pitting edema to upper thigh  PSYCH: Normal affect.  SKIN: No rashes or lesions.      09-01    141  |  100  |  46<H>  ----------------------------<  149<H>  3.6   |  26  |  1.70<H>    Ca    9.0      01 Sep 2024 07:37  Mg     2.5     09-01

## 2024-09-01 NOTE — PROGRESS NOTE ADULT - SUBJECTIVE AND OBJECTIVE BOX
DATE OF SERVICE: 09-01-24 @ 16:37    Patient is a 63y old  Male who presents with a chief complaint of AICD firing (01 Sep 2024 11:21)      INTERVAL HISTORY: no acute events    REVIEW OF SYSTEMS:  CONSTITUTIONAL: No weakness  EYES/ENT: No visual changes;  No throat pain   NECK: No pain or stiffness  RESPIRATORY: No cough, wheezing; No shortness of breath  CARDIOVASCULAR: No chest pain or palpitations  GASTROINTESTINAL: No abdominal  pain. No nausea, vomiting, or hematemesis  GENITOURINARY: No dysuria, frequency or hematuria  NEUROLOGICAL: No stroke like symptoms  SKIN: No rashes    TELEMETRY Personally reviewed: SR 90s  	  MEDICATIONS:  buMETAnide IVPB 12 milliGRAM(s) IV Intermittent <User Schedule>  buMETAnide IVPB 4 milliGRAM(s) IV Intermittent <User Schedule>  hydrALAZINE 25 milliGRAM(s) Oral three times a day  metoprolol succinate ER 25 milliGRAM(s) Oral daily  sacubitril 24 mG/valsartan 26 mG 1 Tablet(s) Oral every 12 hours  spironolactone 25 milliGRAM(s) Oral daily        PHYSICAL EXAM:  T(C): 36.6 (09-01-24 @ 11:25), Max: 36.7 (09-01-24 @ 05:06)  HR: 99 (09-01-24 @ 16:18) (92 - 99)  BP: 101/64 (09-01-24 @ 16:18) (97/62 - 102/63)  RR: 18 (09-01-24 @ 16:18) (18 - 18)  SpO2: 96% (09-01-24 @ 16:18) (94% - 97%)  Wt(kg): --  I&O's Summary    31 Aug 2024 07:01  -  01 Sep 2024 07:00  --------------------------------------------------------  IN: 850 mL / OUT: 1750 mL / NET: -900 mL    01 Sep 2024 07:01  -  01 Sep 2024 16:37  --------------------------------------------------------  IN: 810 mL / OUT: 2200 mL / NET: -1390 mL          Appearance: In no distress	  HEENT:    PERRL, EOMI	  Cardiovascular:  S1 S2, +JVD  Respiratory: Lungs clear to auscultation	  Gastrointestinal:  Soft, Non-tender, + BS	  Vascularature: + edema of LE  Psychiatric: Appropriate affect   Neuro: no acute focal deficits           09-01    141  |  100  |  46<H>  ----------------------------<  149<H>  3.6   |  26  |  1.70<H>    Ca    9.0      01 Sep 2024 07:37  Mg     2.5     09-01          Labs personally reviewed      ASSESSMENT/PLAN: 	      Problem/Plan - 1:  ·  Problem: Acute decompensated systolic heart failure.   ·  Plan: Continue 4mg iv bumex bid, will uptitrate based on response    - TTE 8/26 similar to prior wiith EF 20%  - GDMT Toprol 25mg daily  - Cont Entresto 24/26mg BID as BP tolerates  - Cont Aldactone 25mg  - Hydralazine 10mg PO TID initiated as per HF  - Farxiga/Jardiance 10mg daily following procedures/surgeries  - Appreciate HF recommendations; Possible CardioMems this admission ? pt prefers to do as outpt  - As per wife, dry weight ~247lbs, currently 253.9. 8/31 248 lbs today. Net -900cc today 9/1  - per HF recs -improving volume status, c/w bumex gtt 1mg/hr during the day time  - Cr stable/BUN uptrending.      Problem/Plan - 2:  ·  Problem: CAD (coronary artery disease).   ·  Plan: c/w asa and Plavix    Problem/Plan - 3:  ·  Problem: Chronic kidney disease, unspecified CKD stage.   ·  Plan: Monitor BMP daily, dose meds per renal fx, avoid nephrotoxins.    Problem/Plan - 4:  ·  Problem: ICD shock   ·  Plan: Inappropriate as per EP'  - settings adjusted  - Possible plan for ICD extraction and re-implant, (anticipated 9/3)  - Current ICD disabled--> pacing pads on patient    Problem/Plan - 5:  ·  Problem: Prophylactic measure.   ·  Plan: VTE ppx: hep sq                   Iolani Behrbom, AG-BLAIR Barnes DO [facility]  Cardiovascular Medicine  [address] [place] [address], [address]  Available through call or text on [employer]  Office: [phone]

## 2024-09-01 NOTE — PROGRESS NOTE ADULT - PROBLEM SELECTOR PLAN 3
Etiology: ischemic  BB: c/w toprol 25mg qd  ACE/ARB/ARNI: c/w entresto 24-26mg BID.  hold parameters SBP <90mmhg  Afterload: on hydralazine to 25mg TID.  Can eventually increase Entresto to mid dose and wean off hydralazine once more euvolemic and renal function stabilizes.   MRA: c/w spironolactone 25mg qd  SGLT2: hold for now given possible EP intervention this admission, consider prior to dc  Device: S-ICD with inappropriate shocks, pending extraction and EV-ICD reimplant this admission (anticipated for Tuesday)    Diuresis: improving volume status.  Net -900cc today (850/1750); Cre 1.7.  Continue on  bumex gtt at 1 mg/hr during the daytime hours from 8a-8p (to allow for rest overnight).   Check electrolytes twice daily while on gtt  Would give Iron sucrose x 5 days for iron deficiency in decompensated heart failure    CardioMEMS discussed with patient.  He is interested but would like to do this as an outpatient.

## 2024-09-01 NOTE — PROGRESS NOTE ADULT - SUBJECTIVE AND OBJECTIVE BOX
anemia  HPI  62 yo man with CAD needs new AICD has GAEL not responsive to oral iron receiving IV iron  pmh sh fh unchanged 7 pt ros negative  physical  comfortable  vs  t98 102/63 18 94 sat  s/p LLE amputation

## 2024-09-01 NOTE — PROGRESS NOTE ADULT - PROBLEM SELECTOR PLAN 4
ICD w/ inappropriate shock  Pending extraction and EV-ICD reimplantation once optimized- still requires diuersis.   EP following.

## 2024-09-01 NOTE — PROGRESS NOTE ADULT - ASSESSMENT
GAEL not responsive to oral iron receiving IV iron  Will need GI workup after cardiac condition stabilized

## 2024-09-01 NOTE — PROGRESS NOTE ADULT - ASSESSMENT
63 y.o. male with hx of ischemic cardiomyopathy with HFrEF 30%, cardiac arrest with prior transvenous ICD extraction for infection and subsequent [employer] S-ICD implant 2020, CKD 3, HTN, T2 DM, COPD, LUIS ALFREDO, and LLE amputee who presented after ICD shock.    Interrogation of S-ICD with inappropriate shock due to oversensing.  Pending S-ICD extraction and EV-ICD reimplantation w/ EP.  HF consulted for decompensated heart failure.    TTE 8/26/24   1. Left ventricular cavity is severely dilated. Left ventricular wall thickness is normal. Left ventricular systolic function is severely decreased with an ejection fraction of 22 % by Bell's method of disks. Regional wall motion abnormalities present.   2. Multiple segmental abnormalities exist. See findings.   3. Normal right ventricular cavity size, with normal wall thickness, and probably normal right ventricular systolic function.   4. Estimated pulmonary artery systolic pressure is 30 mmHg.   5. Trace pericardial effusion.    [facility] 11/18/19  LM:  --  LM: Normal.  LAD:   --  Ostial LAD: There was a 100 % stenosis.  CX:   --  Distal circumflex: There was a 80 % stenosis.  RI:   --  Ramus intermedius: Normal. There was no significant restenosis.  RCA:   --  Mid RCA: There was a 50 % stenosis.

## 2024-09-02 LAB
ANION GAP SERPL CALC-SCNC: 13 MMOL/L — SIGNIFICANT CHANGE UP (ref 5–17)
ANION GAP SERPL CALC-SCNC: 15 MMOL/L — SIGNIFICANT CHANGE UP (ref 5–17)
BLD GP AB SCN SERPL QL: NEGATIVE — SIGNIFICANT CHANGE UP
BUN SERPL-MCNC: 48 MG/DL — HIGH (ref 7–23)
BUN SERPL-MCNC: 50 MG/DL — HIGH (ref 7–23)
CALCIUM SERPL-MCNC: 9.1 MG/DL — SIGNIFICANT CHANGE UP (ref 8.4–10.5)
CALCIUM SERPL-MCNC: 9.4 MG/DL — SIGNIFICANT CHANGE UP (ref 8.4–10.5)
CHLORIDE SERPL-SCNC: 97 MMOL/L — SIGNIFICANT CHANGE UP (ref 96–108)
CHLORIDE SERPL-SCNC: 98 MMOL/L — SIGNIFICANT CHANGE UP (ref 96–108)
CO2 SERPL-SCNC: 27 MMOL/L — SIGNIFICANT CHANGE UP (ref 22–31)
CO2 SERPL-SCNC: 29 MMOL/L — SIGNIFICANT CHANGE UP (ref 22–31)
CREAT SERPL-MCNC: 1.7 MG/DL — HIGH (ref 0.5–1.3)
CREAT SERPL-MCNC: 1.91 MG/DL — HIGH (ref 0.5–1.3)
EGFR: 39 ML/MIN/1.73M2 — LOW
EGFR: 45 ML/MIN/1.73M2 — LOW
GLUCOSE BLDC GLUCOMTR-MCNC: 135 MG/DL — HIGH (ref 70–99)
GLUCOSE BLDC GLUCOMTR-MCNC: 140 MG/DL — HIGH (ref 70–99)
GLUCOSE BLDC GLUCOMTR-MCNC: 186 MG/DL — HIGH (ref 70–99)
GLUCOSE BLDC GLUCOMTR-MCNC: 200 MG/DL — HIGH (ref 70–99)
GLUCOSE SERPL-MCNC: 174 MG/DL — HIGH (ref 70–99)
GLUCOSE SERPL-MCNC: 185 MG/DL — HIGH (ref 70–99)
HCT VFR BLD CALC: 35.1 % — LOW (ref 39–50)
HGB BLD-MCNC: 9.6 G/DL — LOW (ref 13–17)
MAGNESIUM SERPL-MCNC: 2.3 MG/DL — SIGNIFICANT CHANGE UP (ref 1.6–2.6)
MAGNESIUM SERPL-MCNC: 2.4 MG/DL — SIGNIFICANT CHANGE UP (ref 1.6–2.6)
MCHC RBC-ENTMCNC: 19.7 PG — LOW (ref 27–34)
MCHC RBC-ENTMCNC: 27.4 GM/DL — LOW (ref 32–36)
MCV RBC AUTO: 71.9 FL — LOW (ref 80–100)
NRBC # BLD: 0 /100 WBCS — SIGNIFICANT CHANGE UP (ref 0–0)
PLATELET # BLD AUTO: 201 K/UL — SIGNIFICANT CHANGE UP (ref 150–400)
POTASSIUM SERPL-MCNC: 3.6 MMOL/L — SIGNIFICANT CHANGE UP (ref 3.5–5.3)
POTASSIUM SERPL-MCNC: 3.9 MMOL/L — SIGNIFICANT CHANGE UP (ref 3.5–5.3)
POTASSIUM SERPL-SCNC: 3.6 MMOL/L — SIGNIFICANT CHANGE UP (ref 3.5–5.3)
POTASSIUM SERPL-SCNC: 3.9 MMOL/L — SIGNIFICANT CHANGE UP (ref 3.5–5.3)
RBC # BLD: 4.88 M/UL — SIGNIFICANT CHANGE UP (ref 4.2–5.8)
RBC # FLD: 20.5 % — HIGH (ref 10.3–14.5)
RH IG SCN BLD-IMP: POSITIVE — SIGNIFICANT CHANGE UP
SODIUM SERPL-SCNC: 139 MMOL/L — SIGNIFICANT CHANGE UP (ref 135–145)
SODIUM SERPL-SCNC: 140 MMOL/L — SIGNIFICANT CHANGE UP (ref 135–145)
WBC # BLD: 5.92 K/UL — SIGNIFICANT CHANGE UP (ref 3.8–10.5)
WBC # FLD AUTO: 5.92 K/UL — SIGNIFICANT CHANGE UP (ref 3.8–10.5)

## 2024-09-02 PROCEDURE — 99232 SBSQ HOSP IP/OBS MODERATE 35: CPT | Mod: 57,GC

## 2024-09-02 RX ADMIN — Medication 10 UNIT(S): at 08:03

## 2024-09-02 RX ADMIN — Medication 1 APPLICATION(S): at 05:53

## 2024-09-02 RX ADMIN — SACUBITRIL AND VALSARTAN 1 TABLET(S): 49; 51 TABLET, FILM COATED ORAL at 17:36

## 2024-09-02 RX ADMIN — Medication 75 MILLIGRAM(S): at 11:06

## 2024-09-02 RX ADMIN — METOPROLOL TARTRATE 25 MILLIGRAM(S): 100 TABLET ORAL at 05:53

## 2024-09-02 RX ADMIN — Medication 0.25 MILLIGRAM(S): at 22:46

## 2024-09-02 RX ADMIN — Medication 25 MILLIGRAM(S): at 14:16

## 2024-09-02 RX ADMIN — BUMETANIDE 132 MILLIGRAM(S): 2 TABLET ORAL at 07:38

## 2024-09-02 RX ADMIN — Medication 10 UNIT(S): at 11:56

## 2024-09-02 RX ADMIN — Medication 1: at 11:56

## 2024-09-02 RX ADMIN — SPIRONOLACTONE 25 MILLIGRAM(S): 25 TABLET, FILM COATED ORAL at 05:53

## 2024-09-02 RX ADMIN — TAMSULOSIN HYDROCHLORIDE 0.4 MILLIGRAM(S): 0.4 CAPSULE ORAL at 21:24

## 2024-09-02 RX ADMIN — Medication 10 UNIT(S): at 17:37

## 2024-09-02 RX ADMIN — Medication 25 MILLIGRAM(S): at 05:53

## 2024-09-02 RX ADMIN — ROSUVASTATIN CALCIUM 20 MILLIGRAM(S): 10 TABLET ORAL at 21:24

## 2024-09-02 RX ADMIN — SACUBITRIL AND VALSARTAN 1 TABLET(S): 49; 51 TABLET, FILM COATED ORAL at 05:53

## 2024-09-02 RX ADMIN — Medication 25 MILLIGRAM(S): at 21:24

## 2024-09-02 RX ADMIN — BUMETANIDE 9 MILLIGRAM(S): 2 TABLET ORAL at 09:18

## 2024-09-02 RX ADMIN — Medication 81 MILLIGRAM(S): at 11:06

## 2024-09-02 RX ADMIN — INSULIN GLARGINE 30 UNIT(S): 100 INJECTION, SOLUTION SUBCUTANEOUS at 21:24

## 2024-09-02 RX ADMIN — Medication 40 MILLIGRAM(S): at 05:53

## 2024-09-02 NOTE — PROGRESS NOTE ADULT - ASSESSMENT
63 year old Male with PMHx HFrEF s/p ICD, HTN, T2DM, s/p LLE amputation in [place] ~2 months ago. Pt presented to ED for ICD shock.        1- CKDIII  2- CHF  3- DM2  4- anemia  5- cellulitis       per chart review, creatinine ranges higher   creatinine is steady  on bumex drip 0.5 mg hr x 12 hr   dc bumex drip change to po bumex 4 mg daily  aldactone 25 mg daily for CHF  non oliguric  ICD deactivated, EP following for possible removal and re-implant tuesday  anemia, trend hgb venofer infusion  check guaiac   levofloxacin 500 mg daily for cellulitis   continue entresto 24/46 mg bid   trend bp  strict I/O  daily standing weight   trend creatinine and electrolytes daily

## 2024-09-02 NOTE — PROGRESS NOTE ADULT - PROBLEM SELECTOR PLAN 3
Etiology: ischemic  BB: c/w toprol 25mg qd  ACE/ARB/ARNI: c/w entresto 24-26mg BID.  hold parameters SBP <90mmhg  Afterload: on hydralazine to 25mg TID.  Can eventually increase Entresto to mid dose and wean off hydralazine once more euvolemic and renal function stabilizes.   MRA: c/w spironolactone 25mg qd  SGLT2: hold for now given possible EP intervention this admission, consider prior to dc  Device: S-ICD with inappropriate shocks, pending extraction and EV-ICD reimplant this admission (anticipated for Tuesday)    Diuresis: improving volume status.  Net -900cc today (850/1750); Cre 1.7.  Decrease bumex gtt to 0.5 mg/hr during the daytime hours from 8a-8p (to allow for rest overnight).   Check electrolytes twice daily while on gtt  Would give Iron sucrose x 5 days for iron deficiency in decompensated heart failure    CardioMEMS discussed with patient.  He is interested but would like to do this as an outpatient.

## 2024-09-02 NOTE — PROGRESS NOTE ADULT - TIME BILLING
Time spent at bedside interviewing and examining the patient, reviewing the chart and telemetry, and coordinating care with the primary team.  I counselled patient on the heart failure disease process and the ongoing medical therapy.
Time spent at bedside interviewing and examining the patient, reviewing the chart and telemetry, and coordinating care with the primary team.  I counselled patient on the heart failure disease process and the ongoing medical therapy.
- Review of chart and consultation notes  - Exam and discussion with patient  - Review of laboratory and imaging   - Establishing a care plan for patient  - Communication with other providers
Time spent at bedside interviewing and examining the patient, reviewing the chart and telemetry, and coordinating care with the primary team.  I counselled patient on the heart failure disease process and the ongoing medical therapy.

## 2024-09-02 NOTE — PROGRESS NOTE ADULT - SUBJECTIVE AND OBJECTIVE BOX
[facility]   [phone]  RENAL FOLLOW UP NOTE  --------------------------------------------------------------------------------  Chief Complaint:    24 hour events/subjective:    seen earlier states breathing is better     PAST HISTORY  --------------------------------------------------------------------------------  No significant changes to PMH, PSH, FHx, SHx, unless otherwise noted    ALLERGIES & MEDICATIONS  --------------------------------------------------------------------------------  Allergies    ceftriaxone (Rash)  doxepin (Other)  Zosyn (Pruritus)  vancomycin (Rash (Mild to Mod))    Intolerances      Standing Inpatient Medications  ammonium lactate 12% Lotion 1 Application(s) Topical every 12 hours  aspirin enteric coated 81 milliGRAM(s) Oral daily  buMETAnide IVPB 4 milliGRAM(s) IV Intermittent <User Schedule>  buMETAnide IVPB 12 milliGRAM(s) IV Intermittent <User Schedule>  clopidogrel Tablet 75 milliGRAM(s) Oral daily  dextrose 5%. 1000 milliLiter(s) IV Continuous <Continuous>  dextrose 5%. 1000 milliLiter(s) IV Continuous <Continuous>  dextrose 50% Injectable 25 Gram(s) IV Push once  dextrose 50% Injectable 25 Gram(s) IV Push once  dextrose 50% Injectable 12.5 Gram(s) IV Push once  glucagon  Injectable 1 milliGRAM(s) IntraMuscular once  hydrALAZINE 25 milliGRAM(s) Oral three times a day  insulin glargine Injectable (LANTUS) 30 Unit(s) SubCutaneous at bedtime  insulin lispro (ADMELOG) corrective regimen sliding scale   SubCutaneous three times a day before meals  insulin lispro (ADMELOG) corrective regimen sliding scale   SubCutaneous at bedtime  insulin lispro Injectable (ADMELOG) 10 Unit(s) SubCutaneous three times a day before meals  levoFLOXacin  Tablet 500 milliGRAM(s) Oral every 24 hours  metoprolol succinate ER 25 milliGRAM(s) Oral daily  pantoprazole    Tablet 40 milliGRAM(s) Oral before breakfast  rosuvastatin 20 milliGRAM(s) Oral at bedtime  sacubitril 24 mG/valsartan 26 mG 1 Tablet(s) Oral every 12 hours  sodium chloride 0.65% Nasal 2 Spray(s) Both Nostrils four times a day  spironolactone 25 milliGRAM(s) Oral daily  tamsulosin 0.4 milliGRAM(s) Oral at bedtime    PRN Inpatient Medications  acetaminophen     Tablet .. 650 milliGRAM(s) Oral every 6 hours PRN  aluminum hydroxide/magnesium hydroxide/simethicone Suspension 30 milliLiter(s) Oral every 4 hours PRN  dextrose Oral Gel 15 Gram(s) Oral once PRN  melatonin 3 milliGRAM(s) Oral at bedtime PRN  ondansetron Injectable 4 milliGRAM(s) IV Push every 8 hours PRN  oxyCODONE    IR 5 milliGRAM(s) Oral daily PRN      REVIEW OF SYSTEMS  --------------------------------------------------------------------------------    Gen: denies  fevers/chills,  CVS: denies chest pain/palpitations  Resp: denies SOB/Cough  GI: Denies N/V/Abd pain  : Denies dysuria     VITALS/PHYSICAL EXAM  --------------------------------------------------------------------------------  T(C): 36.6 (09-02-24 @ 20:36), Max: 36.8 (09-02-24 @ 04:37)  HR: 99 (09-02-24 @ 20:36) (66 - 102)  BP: 106/64 (09-02-24 @ 20:36) (91/56 - 107/69)  RR: 18 (09-02-24 @ 20:36) (18 - 18)  SpO2: 94% (09-02-24 @ 20:36) (94% - 98%)  Wt(kg): --        09-01-24 @ 07:01  -  09-02-24 @ 07:00  --------------------------------------------------------  IN: 860 mL / OUT: 3530 mL / NET: -2670 mL    09-02-24 @ 07:01  -  09-02-24 @ 22:47  --------------------------------------------------------  IN: 720 mL / OUT: 4250 mL / NET: -3530 mL      Physical Exam:  	    	Gen: Non toxic comfortable appearing   	Pulm: decrease bs  no rales or ronchi or wheezing  	CV: +mild JVD, RRR, S1S2; no rub  	Back: No CVA tenderness; no sacral edema  	Abd: +BS, soft, nontender/+distended, +abd wall edema  	: No suprapubic tenderness  	UE: Warm, no cyanosis  no clubbing,  no edema;  	LE: Warm, no cyanosis  no clubbing, RLE chronic venous stasis, +edema, LLE BKA decreased   	Neuro: alert and oriented. speech coherent       LABS/STUDIES  --------------------------------------------------------------------------------              9.6    5.92  >-----------<  201      [09-02-24 @ 12:32]              35.1     139  |  97  |  50  ----------------------------<  185      [09-02-24 @ 21:15]  3.9   |  27  |  1.91        Ca     9.4     [09-02-24 @ 21:15]      Mg     2.3     [09-02-24 @ 21:15]            Creatinine Trend:  SCr 1.91 [09-02 @ 21:15]  SCr 1.70 [09-02 @ 12:32]  SCr 1.81 [09-01 @ 18:14]  SCr 1.70 [09-01 @ 07:37]  SCr 1.78 [08-31 @ 18:35]        Iron 24, TIBC 395, %sat 6      [08-29-24 @ 06:04]  Ferritin 51      [08-29-24 @ 07:31]  HbA1c 8.9      [12-16-19 @ 08:15]    Free Light Chains: kappa 9.40, lambda 8.09, ratio = 1.16      [08-30 @ 06:49]

## 2024-09-02 NOTE — PROGRESS NOTE ADULT - SUBJECTIVE AND OBJECTIVE BOX
Patient is a 63y old  Male who presents with a chief complaint of AICD firing (01 Sep 2024 11:21)      SUBJECTIVE / OVERNIGHT EVENTS:    Events noted.  CONSTITUTIONAL: No fever,  or fatigue  RESPIRATORY: No cough, wheezing,  No shortness of breath  CARDIOVASCULAR: No chest pain, palpitations  GASTROINTESTINAL: No abdominal or epigastric pain.       MEDICATIONS  (STANDING):  ALPRAZolam 0.25 milliGRAM(s) Oral once  ammonium lactate 12% Lotion 1 Application(s) Topical every 12 hours  aspirin enteric coated 81 milliGRAM(s) Oral daily  buMETAnide IVPB 4 milliGRAM(s) IV Intermittent <User Schedule>  buMETAnide IVPB 12 milliGRAM(s) IV Intermittent <User Schedule>  clopidogrel Tablet 75 milliGRAM(s) Oral daily  dextrose 5%. 1000 milliLiter(s) (100 mL/Hr) IV Continuous <Continuous>  dextrose 5%. 1000 milliLiter(s) (50 mL/Hr) IV Continuous <Continuous>  dextrose 50% Injectable 25 Gram(s) IV Push once  dextrose 50% Injectable 25 Gram(s) IV Push once  dextrose 50% Injectable 12.5 Gram(s) IV Push once  glucagon  Injectable 1 milliGRAM(s) IntraMuscular once  hydrALAZINE 25 milliGRAM(s) Oral three times a day  insulin glargine Injectable (LANTUS) 30 Unit(s) SubCutaneous at bedtime  insulin lispro (ADMELOG) corrective regimen sliding scale   SubCutaneous three times a day before meals  insulin lispro (ADMELOG) corrective regimen sliding scale   SubCutaneous at bedtime  insulin lispro Injectable (ADMELOG) 10 Unit(s) SubCutaneous three times a day before meals  levoFLOXacin  Tablet 500 milliGRAM(s) Oral every 24 hours  metoprolol succinate ER 25 milliGRAM(s) Oral daily  pantoprazole    Tablet 40 milliGRAM(s) Oral before breakfast  rosuvastatin 20 milliGRAM(s) Oral at bedtime  sacubitril 24 mG/valsartan 26 mG 1 Tablet(s) Oral every 12 hours  sodium chloride 0.65% Nasal 2 Spray(s) Both Nostrils four times a day  spironolactone 25 milliGRAM(s) Oral daily  tamsulosin 0.4 milliGRAM(s) Oral at bedtime    MEDICATIONS  (PRN):  acetaminophen     Tablet .. 650 milliGRAM(s) Oral every 6 hours PRN Temp greater or equal to 38C (100.4F), Mild Pain (1 - 3)  aluminum hydroxide/magnesium hydroxide/simethicone Suspension 30 milliLiter(s) Oral every 4 hours PRN Dyspepsia  dextrose Oral Gel 15 Gram(s) Oral once PRN Blood Glucose LESS THAN 70 milliGRAM(s)/deciliter  melatonin 3 milliGRAM(s) Oral at bedtime PRN Insomnia  ondansetron Injectable 4 milliGRAM(s) IV Push every 8 hours PRN Nausea and/or Vomiting  oxyCODONE    IR 5 milliGRAM(s) Oral daily PRN Severe Pain (7 - 10)        CAPILLARY BLOOD GLUCOSE      POCT Blood Glucose.: 186 mg/dL (02 Sep 2024 20:59)  POCT Blood Glucose.: 135 mg/dL (02 Sep 2024 17:20)  POCT Blood Glucose.: 200 mg/dL (02 Sep 2024 11:47)  POCT Blood Glucose.: 140 mg/dL (02 Sep 2024 07:44)    I&O's Summary    01 Sep 2024 07:01  -  02 Sep 2024 07:00  --------------------------------------------------------  IN: 860 mL / OUT: 3530 mL / NET: -2670 mL    02 Sep 2024 07:01  -  02 Sep 2024 21:43  --------------------------------------------------------  IN: 720 mL / OUT: 4250 mL / NET: -3530 mL        T(C): 36.6 (09-02-24 @ 20:36), Max: 36.8 (09-02-24 @ 04:37)  HR: 99 (09-02-24 @ 20:36) (66 - 102)  BP: 106/64 (09-02-24 @ 20:36) (91/56 - 107/69)  RR: 18 (09-02-24 @ 20:36) (18 - 18)  SpO2: 94% (09-02-24 @ 20:36) (94% - 98%)    PHYSICAL EXAM:    NECK: Supple, No JVD  CHEST/LUNG: Clear to auscultation bilaterally; No wheezing.  HEART: Regular rate and rhythm; No murmurs, rubs, or gallops  ABDOMEN: Soft, Nontender, Nondistended; Bowel sounds present  EXTREMITIES:   No edema  NEUROLOGY: AAO      LABS:                        9.6    5.92  )-----------( 201      ( 02 Sep 2024 12:32 )             35.1     09-02    139  |  97  |  50<H>  ----------------------------<  185<H>  3.9   |  27  |  1.91<H>    Ca    9.4      02 Sep 2024 21:15  Mg     2.3     09-02            Urinalysis Basic - ( 02 Sep 2024 21:15 )    Color: x / Appearance: x / SG: x / pH: x  Gluc: 185 mg/dL / Ketone: x  / Bili: x / Urobili: x   Blood: x / Protein: x / Nitrite: x   Leuk Esterase: x / RBC: x / WBC x   Sq Epi: x / Non Sq Epi: x / Bacteria: x      CAPILLARY BLOOD GLUCOSE      POCT Blood Glucose.: 186 mg/dL (02 Sep 2024 20:59)  POCT Blood Glucose.: 135 mg/dL (02 Sep 2024 17:20)  POCT Blood Glucose.: 200 mg/dL (02 Sep 2024 11:47)  POCT Blood Glucose.: 140 mg/dL (02 Sep 2024 07:44)        RADIOLOGY & ADDITIONAL TESTS:    Imaging Personally Reviewed:    Consultant(s) Notes Reviewed:      Care Discussed with Consultants/Other Providers:    Maximus Lowery MD, CMD, FACP    [address] [place] [address]  Office Tel: [phone]  Cell: [phone]

## 2024-09-02 NOTE — PROGRESS NOTE ADULT - ASSESSMENT
63 y.o. male with hx of ischemic cardiomyopathy with HFrEF 30%, cardiac arrest with prior transvenous ICD extraction for infection and subsequent [employer]i S-ICD implant 2020, CKD 3, HTN, T2 DM, COPD, LUIS ALFREDO, and LLE amputee who presented after ICD shock.    Interrogation of S-ICD with inappropriate shock due to oversensing.  Pending S-ICD extraction and EV-ICD reimplantation w/ EP.  HF consulted for decompensated heart failure.    TTE 8/26/24   1. Left ventricular cavity is severely dilated. Left ventricular wall thickness is normal. Left ventricular systolic function is severely decreased with an ejection fraction of 22 % by Bell's method of disks. Regional wall motion abnormalities present.   2. Multiple segmental abnormalities exist. See findings.   3. Normal right ventricular cavity size, with normal wall thickness, and probably normal right ventricular systolic function.   4. Estimated pulmonary artery systolic pressure is 30 mmHg.   5. Trace pericardial effusion.    [facility] 11/18/19  LM:  --  LM: Normal.  LAD:   --  Ostial LAD: There was a 100 % stenosis.  CX:   --  Distal circumflex: There was a 80 % stenosis.  RI:   --  Ramus intermedius: Normal. There was no significant restenosis.  RCA:   --  Mid RCA: There was a 50 % stenosis.

## 2024-09-02 NOTE — PROGRESS NOTE ADULT - PROBLEM SELECTOR PLAN 4
ICD w/ inappropriate shock  Pending extraction and EV-ICD reimplantation once optimized- still requires diuersis.   EP following. ICD w/ inappropriate shock  Pending extraction and EV-ICD reimplantation once optimized- still requires diuersis. but near target, NPO @ MN   EP following.

## 2024-09-02 NOTE — PROGRESS NOTE ADULT - SUBJECTIVE AND OBJECTIVE BOX
DATE OF SERVICE: 09-02-24 @ 23:30    Patient is a 63y old  Male who presents with a chief complaint of AICD firing (01 Sep 2024 11:21)      INTERVAL HISTORY: feels ok    TELEMETRY Personally reviewed: no events  	  MEDICATIONS:  buMETAnide IVPB 4 milliGRAM(s) IV Intermittent <User Schedule>  buMETAnide IVPB 12 milliGRAM(s) IV Intermittent <User Schedule>  hydrALAZINE 25 milliGRAM(s) Oral three times a day  metoprolol succinate ER 25 milliGRAM(s) Oral daily  sacubitril 24 mG/valsartan 26 mG 1 Tablet(s) Oral every 12 hours  spironolactone 25 milliGRAM(s) Oral daily        PHYSICAL EXAM:  T(C): 36.6 (09-02-24 @ 20:36), Max: 36.8 (09-02-24 @ 04:37)  HR: 99 (09-02-24 @ 20:36) (66 - 102)  BP: 106/64 (09-02-24 @ 20:36) (91/56 - 107/69)  RR: 18 (09-02-24 @ 20:36) (18 - 18)  SpO2: 94% (09-02-24 @ 20:36) (94% - 98%)  Wt(kg): --  I&O's Summary    01 Sep 2024 07:01  -  02 Sep 2024 07:00  --------------------------------------------------------  IN: 860 mL / OUT: 3530 mL / NET: -2670 mL    02 Sep 2024 07:01  -  02 Sep 2024 23:30  --------------------------------------------------------  IN: 720 mL / OUT: 4250 mL / NET: -3530 mL          Appearance: In no distress	  HEENT:    PERRL, EOMI	  Cardiovascular:  S1 S2, No JVD  Respiratory: Lungs clear to auscultation	  Gastrointestinal:  Soft, Non-tender, + BS	  Vascularature:  No edema of LE  Psychiatric: Appropriate affect   Neuro: no acute focal deficits                               9.6    5.92  )-----------( 201      ( 02 Sep 2024 12:32 )             35.1     09-02    139  |  97  |  50<H>  ----------------------------<  185<H>  3.9   |  27  |  1.91<H>    Ca    9.4      02 Sep 2024 21:15  Mg     2.3     09-02          Labs personally reviewed      ASSESSMENT/PLAN: 	      Problem/Plan - 1:  ·  Problem: Acute decompensated systolic heart failure.   ·  Plan: Continue 4mg iv bumex bid, will uptitrate based on response    - TTE 8/26 similar to prior wiith EF 20%  - GDMT Toprol 25mg daily  - Cont Entresto 24/26mg BID as BP tolerates  - Cont Aldactone 25mg  - Hydralazine 10mg PO TID initiated as per HF  - Farxiga/Jardiance 10mg daily following procedures/surgeries  - Appreciate HF recommendations; Possible CardioMems this admission ? pt prefers to do as outpt  - As per wife, dry weight ~247lbs, currently 253.9. 8/31 248 lbs today. Net -900cc today 9/1  - per HF recs -improving volume status  - 9/2 decrease bumex gtt 0/5mg/hr during the day time per HF  - Cr stable/BUN uptrending.      Problem/Plan - 2:  ·  Problem: CAD (coronary artery disease).   ·  Plan: c/w asa and Plavix    Problem/Plan - 3:  ·  Problem: Chronic kidney disease, unspecified CKD stage.   ·  Plan: Monitor BMP daily, dose meds per renal fx, avoid nephrotoxins.    Problem/Plan - 4:  ·  Problem: ICD shock   ·  Plan: Inappropriate as per EP  - settings adjusted  - Possible plan for ICD extraction and re-implant, (anticipated 9/3)  - Current ICD disabled--> pacing pads on patient    Problem/Plan - 5:  ·  Problem: Prophylactic measure.   ·  Plan: VTE ppx: hep sq             Shawn Barnes DO [facility]  Cardiovascular Medicine  [address], [address]  Office: [phone]  Available via Text/call on Microsoft Teams

## 2024-09-02 NOTE — PROGRESS NOTE ADULT - ASSESSMENT
63 year old male with hx of ischemic cardiomyopathy with HFrEF 30%, cardiac arrest with prior transvenous ICD extraction for infection and subsequent [employer] S-ICD implant 2020, CKD 3, HTN, T2 DM, COPD, LUIS ALFREDO, and LLE amputee who presented after ICD shock. S-ICD Interrogation showed an inappropriate ICD shock for device oversensing (see procedure note for full interrogation). Patient denies prior ICD shocks in either devices that he has had.        1. Inappropriate S-ICD shock for device oversensing  2. Acute decompensated heart failure   3. right lower extremity Cellulitis, wound    - ICD remains deactivated for inappropriate device oversensing leading to inappropriate shock likely in setting of new RBBB on EKG which was not present in 2020 EKG  - Currently being treated for HF exacerbation with IV Bumex, spoke with HF team and he is optimized for tomorrow  - Plan for S-ICD removal and EV-ICD implant by Dr. Rodríguez tomorrow  - Keep NPO after midnight  - Please avoid Farxiga/ Jardiance medications in preparation for surgical procedure  - Keep NPO after midnight prior to procedure  - Ensure type & screen x 2 active

## 2024-09-02 NOTE — PROGRESS NOTE ADULT - SUBJECTIVE AND OBJECTIVE BOX
Preston Parker MD  Cardiology Fellow  [phone]  All Cardiology service information can be found 24/7 on [email or web address], password: [email or web address]    Patient seen and examined at bedside.  Reiterates that he would like to leave asap to see new granddaughter   Still volume overloaded but significantly improved in volume status with less stiff edema in TORRIE     Review Of Systems: No chest pain, shortness of breath, or palpitations            Current Meds:  acetaminophen     Tablet .. 650 milliGRAM(s) Oral every 6 hours PRN  ALPRAZolam 0.25 milliGRAM(s) Oral once  aluminum hydroxide/magnesium hydroxide/simethicone Suspension 30 milliLiter(s) Oral every 4 hours PRN  ammonium lactate 12% Lotion 1 Application(s) Topical every 12 hours  aspirin enteric coated 81 milliGRAM(s) Oral daily  buMETAnide IVPB 4 milliGRAM(s) IV Intermittent <User Schedule>  buMETAnide IVPB 12 milliGRAM(s) IV Intermittent <User Schedule>  clopidogrel Tablet 75 milliGRAM(s) Oral daily  dextrose 5%. 1000 milliLiter(s) IV Continuous <Continuous>  dextrose 5%. 1000 milliLiter(s) IV Continuous <Continuous>  dextrose 50% Injectable 25 Gram(s) IV Push once  dextrose 50% Injectable 25 Gram(s) IV Push once  dextrose 50% Injectable 12.5 Gram(s) IV Push once  dextrose Oral Gel 15 Gram(s) Oral once PRN  glucagon  Injectable 1 milliGRAM(s) IntraMuscular once  hydrALAZINE 25 milliGRAM(s) Oral three times a day  insulin glargine Injectable (LANTUS) 30 Unit(s) SubCutaneous at bedtime  insulin lispro (ADMELOG) corrective regimen sliding scale   SubCutaneous three times a day before meals  insulin lispro (ADMELOG) corrective regimen sliding scale   SubCutaneous at bedtime  insulin lispro Injectable (ADMELOG) 10 Unit(s) SubCutaneous three times a day before meals  levoFLOXacin  Tablet 500 milliGRAM(s) Oral every 24 hours  melatonin 3 milliGRAM(s) Oral at bedtime PRN  metoprolol succinate ER 25 milliGRAM(s) Oral daily  ondansetron Injectable 4 milliGRAM(s) IV Push every 8 hours PRN  oxyCODONE    IR 5 milliGRAM(s) Oral daily PRN  pantoprazole    Tablet 40 milliGRAM(s) Oral before breakfast  rosuvastatin 20 milliGRAM(s) Oral at bedtime  sacubitril 24 mG/valsartan 26 mG 1 Tablet(s) Oral every 12 hours  sodium chloride 0.65% Nasal 2 Spray(s) Both Nostrils four times a day  spironolactone 25 milliGRAM(s) Oral daily  tamsulosin 0.4 milliGRAM(s) Oral at bedtime      Vitals:  T(F): 97.5 (09-02), Max: 98.2 (09-02)  HR: 66 (09-02) (66 - 103)  BP: 91/56 (09-02) (91/56 - 101/64)  RR: 18 (09-02)  SpO2: 96% (09-02)  I&O's Summary    01 Sep 2024 07:01  -  02 Sep 2024 07:00  --------------------------------------------------------  IN: 810 mL / OUT: 3530 mL / NET: -2720 mL    GENERAL: NAD  HEAD: Atraumatic, Normocephalic.  EYES: EOMI, PERRLA, conjunctiva and sclera clear.  ENT: Moist mucous membranes.  NECK: Supple, + JVD to mid neck  CHEST/LUNG: Diminished breath sounds at bases bilaterally  HEART: Regular rate and rhythm; No murmurs, rubs, or gallops.  ABDOMEN: Bowel sounds present; Mild abdominal distension  EXTREMITIES:  +LLE amputation. Chronic venous stasis changes of RLE. +pitting edema to upper thigh  PSYCH: Normal affect.  SKIN: No rashes or lesions.      09-01    139  |  99  |  48<H>  ----------------------------<  204<H>  3.8   |  28  |  1.81<H>    Ca    9.1      01 Sep 2024 18:14  Mg     2.3     09-01

## 2024-09-02 NOTE — PROGRESS NOTE ADULT - SUBJECTIVE AND OBJECTIVE BOX
EPS Progress Note    S: Doing well.   T(C): 36.4 (09-02-24 @ 11:00), Max: 36.8 (09-02-24 @ 04:37)  HR: 92 (09-02-24 @ 14:15) (66 - 103)  BP: 103/67 (09-02-24 @ 14:15) (91/56 - 103/67)  RR: 18 (09-02-24 @ 11:00) (18 - 18)  SpO2: 96% (09-02-24 @ 11:00) (96% - 97%)  Wt(kg): --     Telemetry: SR          General:  No acute distress      Chest:  Chest is clear to auscultation bilaterally without wheezes, crackles, or rhonchi  Cardiac:  Regular rate and rhythm.  No murmur, rubs, or gallops heard.  Abdomen:  Soft without rebound or guarding.  Bowel sounds are presnt in all 4 quadrants.  No hepatosplenomegaly  Extremities:  No lower extremity edema is present.  No cyanosis or clubbing       MEDICATIONS  (STANDING):  ALPRAZolam 0.25 milliGRAM(s) Oral once  ammonium lactate 12% Lotion 1 Application(s) Topical every 12 hours  aspirin enteric coated 81 milliGRAM(s) Oral daily  buMETAnide IVPB 12 milliGRAM(s) IV Intermittent <User Schedule>  buMETAnide IVPB 4 milliGRAM(s) IV Intermittent <User Schedule>  clopidogrel Tablet 75 milliGRAM(s) Oral daily  dextrose 5%. 1000 milliLiter(s) (100 mL/Hr) IV Continuous <Continuous>  dextrose 5%. 1000 milliLiter(s) (50 mL/Hr) IV Continuous <Continuous>  dextrose 50% Injectable 25 Gram(s) IV Push once  dextrose 50% Injectable 12.5 Gram(s) IV Push once  dextrose 50% Injectable 25 Gram(s) IV Push once  glucagon  Injectable 1 milliGRAM(s) IntraMuscular once  hydrALAZINE 25 milliGRAM(s) Oral three times a day  insulin glargine Injectable (LANTUS) 30 Unit(s) SubCutaneous at bedtime  insulin lispro (ADMELOG) corrective regimen sliding scale   SubCutaneous three times a day before meals  insulin lispro (ADMELOG) corrective regimen sliding scale   SubCutaneous at bedtime  insulin lispro Injectable (ADMELOG) 10 Unit(s) SubCutaneous three times a day before meals  levoFLOXacin  Tablet 500 milliGRAM(s) Oral every 24 hours  metoprolol succinate ER 25 milliGRAM(s) Oral daily  pantoprazole    Tablet 40 milliGRAM(s) Oral before breakfast  rosuvastatin 20 milliGRAM(s) Oral at bedtime  sacubitril 24 mG/valsartan 26 mG 1 Tablet(s) Oral every 12 hours  sodium chloride 0.65% Nasal 2 Spray(s) Both Nostrils four times a day  spironolactone 25 milliGRAM(s) Oral daily  tamsulosin 0.4 milliGRAM(s) Oral at bedtime    MEDICATIONS  (PRN):  acetaminophen     Tablet .. 650 milliGRAM(s) Oral every 6 hours PRN Temp greater or equal to 38C (100.4F), Mild Pain (1 - 3)  aluminum hydroxide/magnesium hydroxide/simethicone Suspension 30 milliLiter(s) Oral every 4 hours PRN Dyspepsia  dextrose Oral Gel 15 Gram(s) Oral once PRN Blood Glucose LESS THAN 70 milliGRAM(s)/deciliter  melatonin 3 milliGRAM(s) Oral at bedtime PRN Insomnia  ondansetron Injectable 4 milliGRAM(s) IV Push every 8 hours PRN Nausea and/or Vomiting  oxyCODONE    IR 5 milliGRAM(s) Oral daily PRN Severe Pain (7 - 10)                                                         9.6    5.92  )-----------( 201      ( 02 Sep 2024 12:32 )             35.1     09-02    140  |  98  |  48<H>  ----------------------------<  174<H>  3.6   |  29  |  1.70<H>    Ca    9.1      02 Sep 2024 12:32  Mg     2.4     09-02

## 2024-09-03 LAB
% ALBUMIN: 46.1 % — SIGNIFICANT CHANGE UP
% ALPHA 1: 7.6 % — SIGNIFICANT CHANGE UP
% ALPHA 2: 13 % — SIGNIFICANT CHANGE UP
% BETA: 15.1 % — SIGNIFICANT CHANGE UP
% GAMMA: 18.2 % — SIGNIFICANT CHANGE UP
% M SPIKE: SIGNIFICANT CHANGE UP
ADD ON TEST-SPECIMEN IN LAB: SIGNIFICANT CHANGE UP
ALBUMIN SERPL ELPH-MCNC: 3.2 G/DL — LOW (ref 3.6–5.5)
ALBUMIN/GLOB SERPL ELPH: 0.8 RATIO — SIGNIFICANT CHANGE UP
ALPHA1 GLOB SERPL ELPH-MCNC: 0.5 G/DL — HIGH (ref 0.1–0.4)
ALPHA2 GLOB SERPL ELPH-MCNC: 0.9 G/DL — SIGNIFICANT CHANGE UP (ref 0.5–1)
ANION GAP SERPL CALC-SCNC: 15 MMOL/L — SIGNIFICANT CHANGE UP (ref 5–17)
ANION GAP SERPL CALC-SCNC: 16 MMOL/L — SIGNIFICANT CHANGE UP (ref 5–17)
B-GLOBULIN SERPL ELPH-MCNC: 1.1 G/DL — HIGH (ref 0.5–1)
BLD GP AB SCN SERPL QL: NEGATIVE — SIGNIFICANT CHANGE UP
BUN SERPL-MCNC: 50 MG/DL — HIGH (ref 7–23)
BUN SERPL-MCNC: 55 MG/DL — HIGH (ref 7–23)
CALCIUM SERPL-MCNC: 8.9 MG/DL — SIGNIFICANT CHANGE UP (ref 8.4–10.5)
CALCIUM SERPL-MCNC: 9.5 MG/DL — SIGNIFICANT CHANGE UP (ref 8.4–10.5)
CHLORIDE SERPL-SCNC: 95 MMOL/L — LOW (ref 96–108)
CHLORIDE SERPL-SCNC: 97 MMOL/L — SIGNIFICANT CHANGE UP (ref 96–108)
CO2 SERPL-SCNC: 28 MMOL/L — SIGNIFICANT CHANGE UP (ref 22–31)
CO2 SERPL-SCNC: 28 MMOL/L — SIGNIFICANT CHANGE UP (ref 22–31)
CREAT SERPL-MCNC: 1.83 MG/DL — HIGH (ref 0.5–1.3)
CREAT SERPL-MCNC: 1.99 MG/DL — HIGH (ref 0.5–1.3)
EGFR: 38 ML/MIN/1.73M2 — LOW
EGFR: 41 ML/MIN/1.73M2 — LOW
GAMMA GLOBULIN: 1.3 G/DL — SIGNIFICANT CHANGE UP (ref 0.6–1.6)
GLUCOSE BLDC GLUCOMTR-MCNC: 137 MG/DL — HIGH (ref 70–99)
GLUCOSE BLDC GLUCOMTR-MCNC: 185 MG/DL — HIGH (ref 70–99)
GLUCOSE BLDC GLUCOMTR-MCNC: 210 MG/DL — HIGH (ref 70–99)
GLUCOSE BLDC GLUCOMTR-MCNC: 241 MG/DL — HIGH (ref 70–99)
GLUCOSE BLDC GLUCOMTR-MCNC: 292 MG/DL — HIGH (ref 70–99)
GLUCOSE SERPL-MCNC: 153 MG/DL — HIGH (ref 70–99)
GLUCOSE SERPL-MCNC: 223 MG/DL — HIGH (ref 70–99)
HCT VFR BLD CALC: 37.3 % — LOW (ref 39–50)
HGB BLD-MCNC: 10.4 G/DL — LOW (ref 13–17)
INTERPRETATION SERPL IFE-IMP: SIGNIFICANT CHANGE UP
M-SPIKE: SIGNIFICANT CHANGE UP (ref 0–0)
MAGNESIUM SERPL-MCNC: 2.3 MG/DL — SIGNIFICANT CHANGE UP (ref 1.6–2.6)
MCHC RBC-ENTMCNC: 19.7 PG — LOW (ref 27–34)
MCHC RBC-ENTMCNC: 27.9 GM/DL — LOW (ref 32–36)
MCV RBC AUTO: 70.8 FL — LOW (ref 80–100)
NRBC # BLD: 0 /100 WBCS — SIGNIFICANT CHANGE UP (ref 0–0)
PLATELET # BLD AUTO: 221 K/UL — SIGNIFICANT CHANGE UP (ref 150–400)
POTASSIUM SERPL-MCNC: 3.5 MMOL/L — SIGNIFICANT CHANGE UP (ref 3.5–5.3)
POTASSIUM SERPL-MCNC: 4 MMOL/L — SIGNIFICANT CHANGE UP (ref 3.5–5.3)
POTASSIUM SERPL-SCNC: 3.5 MMOL/L — SIGNIFICANT CHANGE UP (ref 3.5–5.3)
POTASSIUM SERPL-SCNC: 4 MMOL/L — SIGNIFICANT CHANGE UP (ref 3.5–5.3)
PROT PATTERN SERPL ELPH-IMP: SIGNIFICANT CHANGE UP
PROT SERPL-MCNC: 7 G/DL — SIGNIFICANT CHANGE UP (ref 6–8.3)
PROT SERPL-MCNC: 7 G/DL — SIGNIFICANT CHANGE UP (ref 6–8.3)
RBC # BLD: 5.27 M/UL — SIGNIFICANT CHANGE UP (ref 4.2–5.8)
RBC # FLD: 21.1 % — HIGH (ref 10.3–14.5)
RH IG SCN BLD-IMP: POSITIVE — SIGNIFICANT CHANGE UP
SODIUM SERPL-SCNC: 139 MMOL/L — SIGNIFICANT CHANGE UP (ref 135–145)
SODIUM SERPL-SCNC: 140 MMOL/L — SIGNIFICANT CHANGE UP (ref 135–145)
WBC # BLD: 6.47 K/UL — SIGNIFICANT CHANGE UP (ref 3.8–10.5)
WBC # FLD AUTO: 6.47 K/UL — SIGNIFICANT CHANGE UP (ref 3.8–10.5)

## 2024-09-03 PROCEDURE — 33244 REMOVE ELCTRD TRANSVENOUSLY: CPT

## 2024-09-03 PROCEDURE — 99233 SBSQ HOSP IP/OBS HIGH 50: CPT

## 2024-09-03 PROCEDURE — 33270 INS/REP SUBQ DEFIBRILLATOR: CPT

## 2024-09-03 PROCEDURE — 93010 ELECTROCARDIOGRAM REPORT: CPT

## 2024-09-03 DEVICE — IMPLANTABLE DEVICE
Type: IMPLANTABLE DEVICE | Site: LEFT | Status: NON-FUNCTIONAL
Removed: 2024-09-03

## 2024-09-03 DEVICE — ENVELOPE TYRX ABSORBABLE ANTIBACTERIAL LG
Type: IMPLANTABLE DEVICE | Site: LEFT | Status: NON-FUNCTIONAL
Removed: 2024-09-03

## 2024-09-03 DEVICE — KIT A-LINE 1LUM 20G X 12CM SAFE KIT
Type: IMPLANTABLE DEVICE | Site: LEFT | Status: NON-FUNCTIONAL
Removed: 2024-09-03

## 2024-09-03 RX ORDER — MAGNESIUM, ALUMINUM HYDROXIDE 200-225/5
30 SUSPENSION, ORAL (FINAL DOSE FORM) ORAL EVERY 4 HOURS
Refills: 0 | Status: DISCONTINUED | OUTPATIENT
Start: 2024-09-03 | End: 2024-09-09

## 2024-09-03 RX ORDER — DEXTROSE 15 G/33 G
15 GEL IN PACKET (GRAM) ORAL ONCE
Refills: 0 | Status: DISCONTINUED | OUTPATIENT
Start: 2024-09-03 | End: 2024-09-09

## 2024-09-03 RX ORDER — PANTOPRAZOLE SODIUM 40 MG
40 TABLET, DELAYED RELEASE (ENTERIC COATED) ORAL
Refills: 0 | Status: DISCONTINUED | OUTPATIENT
Start: 2024-09-03 | End: 2024-09-09

## 2024-09-03 RX ORDER — BUMETANIDE 2 MG/1
4 TABLET ORAL
Refills: 0 | Status: DISCONTINUED | OUTPATIENT
Start: 2024-09-03 | End: 2024-09-04

## 2024-09-03 RX ORDER — FENTANYL CITRATE 50 UG/ML
25 INJECTION INTRAMUSCULAR; INTRAVENOUS
Refills: 0 | Status: DISCONTINUED | OUTPATIENT
Start: 2024-09-03 | End: 2024-09-03

## 2024-09-03 RX ORDER — DEXTROSE 15 G/33 G
12.5 GEL IN PACKET (GRAM) ORAL ONCE
Refills: 0 | Status: DISCONTINUED | OUTPATIENT
Start: 2024-09-03 | End: 2024-09-09

## 2024-09-03 RX ORDER — ONDANSETRON 2 MG/ML
4 INJECTION, SOLUTION INTRAMUSCULAR; INTRAVENOUS EVERY 6 HOURS
Refills: 0 | Status: DISCONTINUED | OUTPATIENT
Start: 2024-09-03 | End: 2024-09-03

## 2024-09-03 RX ORDER — ACETAMINOPHEN 325 MG/1
650 TABLET ORAL EVERY 6 HOURS
Refills: 0 | Status: DISCONTINUED | OUTPATIENT
Start: 2024-09-03 | End: 2024-09-09

## 2024-09-03 RX ORDER — SPIRONOLACTONE 25 MG/1
25 TABLET, FILM COATED ORAL DAILY
Refills: 0 | Status: DISCONTINUED | OUTPATIENT
Start: 2024-09-03 | End: 2024-09-04

## 2024-09-03 RX ORDER — TAMSULOSIN HYDROCHLORIDE 0.4 MG/1
0.4 CAPSULE ORAL AT BEDTIME
Refills: 0 | Status: DISCONTINUED | OUTPATIENT
Start: 2024-09-03 | End: 2024-09-09

## 2024-09-03 RX ORDER — HYDRALAZINE HCL 50 MG
25 TABLET ORAL THREE TIMES A DAY
Refills: 0 | Status: DISCONTINUED | OUTPATIENT
Start: 2024-09-03 | End: 2024-09-04

## 2024-09-03 RX ORDER — AMMONIUM LACTATE 12 %
1 LOTION (GRAM) TOPICAL EVERY 12 HOURS
Refills: 0 | Status: DISCONTINUED | OUTPATIENT
Start: 2024-09-03 | End: 2024-09-09

## 2024-09-03 RX ORDER — HYDROMORPHONE HYDROCHLORIDE 2 MG/1
0.25 TABLET ORAL
Refills: 0 | Status: DISCONTINUED | OUTPATIENT
Start: 2024-09-03 | End: 2024-09-03

## 2024-09-03 RX ORDER — DIPHENHYDRAMINE HCL 50 MG
25 CAPSULE ORAL ONCE
Refills: 0 | Status: COMPLETED | OUTPATIENT
Start: 2024-09-03 | End: 2024-09-03

## 2024-09-03 RX ORDER — INSULIN GLARGINE 100 [IU]/ML
30 INJECTION, SOLUTION SUBCUTANEOUS AT BEDTIME
Refills: 0 | Status: DISCONTINUED | OUTPATIENT
Start: 2024-09-03 | End: 2024-09-09

## 2024-09-03 RX ORDER — OXYCODONE HYDROCHLORIDE 5 MG/1
5 TABLET ORAL DAILY
Refills: 0 | Status: DISCONTINUED | OUTPATIENT
Start: 2024-09-03 | End: 2024-09-03

## 2024-09-03 RX ORDER — BUMETANIDE 2 MG/1
12 TABLET ORAL
Refills: 0 | Status: DISCONTINUED | OUTPATIENT
Start: 2024-09-03 | End: 2024-09-04

## 2024-09-03 RX ORDER — ROSUVASTATIN CALCIUM 10 MG/1
20 TABLET ORAL AT BEDTIME
Refills: 0 | Status: DISCONTINUED | OUTPATIENT
Start: 2024-09-03 | End: 2024-09-09

## 2024-09-03 RX ORDER — SACUBITRIL AND VALSARTAN 49; 51 MG/1; MG/1
1 TABLET, FILM COATED ORAL EVERY 12 HOURS
Refills: 0 | Status: DISCONTINUED | OUTPATIENT
Start: 2024-09-03 | End: 2024-09-04

## 2024-09-03 RX ORDER — ASPIRIN 81 MG
81 TABLET, DELAYED RELEASE (ENTERIC COATED) ORAL DAILY
Refills: 0 | Status: DISCONTINUED | OUTPATIENT
Start: 2024-09-03 | End: 2024-09-09

## 2024-09-03 RX ORDER — DEXTROSE 15 G/33 G
25 GEL IN PACKET (GRAM) ORAL ONCE
Refills: 0 | Status: DISCONTINUED | OUTPATIENT
Start: 2024-09-03 | End: 2024-09-09

## 2024-09-03 RX ORDER — OXYCODONE HYDROCHLORIDE 5 MG/1
5 TABLET ORAL EVERY 4 HOURS
Refills: 0 | Status: DISCONTINUED | OUTPATIENT
Start: 2024-09-03 | End: 2024-09-09

## 2024-09-03 RX ORDER — SODIUM CHLORIDE 0.65 %
2 AEROSOL, SPRAY (ML) NASAL
Refills: 0 | Status: DISCONTINUED | OUTPATIENT
Start: 2024-09-03 | End: 2024-09-09

## 2024-09-03 RX ORDER — OXYCODONE HYDROCHLORIDE 5 MG/1
10 TABLET ORAL EVERY 6 HOURS
Refills: 0 | Status: DISCONTINUED | OUTPATIENT
Start: 2024-09-03 | End: 2024-09-09

## 2024-09-03 RX ORDER — METOPROLOL TARTRATE 100 MG/1
25 TABLET ORAL DAILY
Refills: 0 | Status: DISCONTINUED | OUTPATIENT
Start: 2024-09-03 | End: 2024-09-04

## 2024-09-03 RX ORDER — HYDROCORTISONE 1 %
1 OINTMENT (GRAM) TOPICAL ONCE
Refills: 0 | Status: COMPLETED | OUTPATIENT
Start: 2024-09-03 | End: 2024-09-03

## 2024-09-03 RX ADMIN — BUMETANIDE 5 MILLIGRAM(S): 2 TABLET ORAL at 15:47

## 2024-09-03 RX ADMIN — Medication 2 SPRAY(S): at 17:48

## 2024-09-03 RX ADMIN — Medication 3: at 17:47

## 2024-09-03 RX ADMIN — Medication 25 MILLIGRAM(S): at 06:44

## 2024-09-03 RX ADMIN — SACUBITRIL AND VALSARTAN 1 TABLET(S): 49; 51 TABLET, FILM COATED ORAL at 19:10

## 2024-09-03 RX ADMIN — Medication 75 MILLIGRAM(S): at 13:03

## 2024-09-03 RX ADMIN — ROSUVASTATIN CALCIUM 20 MILLIGRAM(S): 10 TABLET ORAL at 21:39

## 2024-09-03 RX ADMIN — BUMETANIDE 132 MILLIGRAM(S): 2 TABLET ORAL at 08:02

## 2024-09-03 RX ADMIN — Medication 2 SPRAY(S): at 14:47

## 2024-09-03 RX ADMIN — Medication 40 MILLIGRAM(S): at 06:44

## 2024-09-03 RX ADMIN — INSULIN GLARGINE 30 UNIT(S): 100 INJECTION, SOLUTION SUBCUTANEOUS at 21:39

## 2024-09-03 RX ADMIN — Medication 1 APPLICATION(S): at 06:44

## 2024-09-03 RX ADMIN — Medication 25 MILLIGRAM(S): at 21:39

## 2024-09-03 RX ADMIN — Medication 2: at 14:46

## 2024-09-03 RX ADMIN — Medication 1 APPLICATION(S): at 22:13

## 2024-09-03 RX ADMIN — Medication 2 SPRAY(S): at 06:44

## 2024-09-03 RX ADMIN — Medication 25 MILLIGRAM(S): at 17:13

## 2024-09-03 RX ADMIN — Medication 10 UNIT(S): at 17:47

## 2024-09-03 RX ADMIN — SPIRONOLACTONE 25 MILLIGRAM(S): 25 TABLET, FILM COATED ORAL at 06:44

## 2024-09-03 RX ADMIN — Medication 1 APPLICATION(S): at 17:58

## 2024-09-03 RX ADMIN — Medication 81 MILLIGRAM(S): at 13:03

## 2024-09-03 RX ADMIN — Medication 1 APPLICATION(S): at 22:52

## 2024-09-03 RX ADMIN — SACUBITRIL AND VALSARTAN 1 TABLET(S): 49; 51 TABLET, FILM COATED ORAL at 06:46

## 2024-09-03 RX ADMIN — METOPROLOL TARTRATE 25 MILLIGRAM(S): 100 TABLET ORAL at 06:44

## 2024-09-03 RX ADMIN — TAMSULOSIN HYDROCHLORIDE 0.4 MILLIGRAM(S): 0.4 CAPSULE ORAL at 21:39

## 2024-09-03 NOTE — PROGRESS NOTE ADULT - ASSESSMENT
63 year old Male with PMHx HFrEF s/p ICD, HTN, T2DM, s/p LLE amputation in [place] ~2 months ago. Pt presented to ED for ICD shock 24.  and SOB. Being followed by cardiology, nephrology for CKD and podiatry on abx  Plan for ICD extraction and re-implant, 9/3/24  Seen for anemia  He was more anemic than in . CKD is old.  He is on antiplatelets and chronic inflammation.  Ferritin: 51 ng/mL (24 @ 07:31)   Iron with Total Binding Capacity in AM (24 @ 06:04)   Iron - Total Binding Capacity.: 395 ug/dL  % Saturation, Iron: 6 %  Iron Total: 24 ug/dL  Unsaturated Iron Binding Capacity: 371 ug/dL  Vitamin B12, Serum: 435 pg/mL (24 @ 06:49)   Haptoglobin, Serum: 243 mg/dL (24 @ 06:49)   mmunoglobulins Panel (24 @ 06:49)   Quantitative Ig mg/dL  Quantitative IgA: 395 mg/dL  Quantitative IgM: 144 mg/dL  RANDALL Kappa: 9.40 mg/dL  RANDALL Lambda: 8.09 mg/dL  [place]/Lambda Free Light Chain Ratio, Serum: 1.16 Ratio  Was given IV iron for GAEL component of anemia. Will need GI eval eventually.  CKD and comorbidities also contributing to anemia

## 2024-09-03 NOTE — PRE-ANESTHESIA EVALUATION ADULT - NSANTHPMHFT_GEN_ALL_CORE
Medical history and ROS reviewed Medical history and ROS reviewed  h/o CHF EF 20% s/p subQ ICD with inappropriate discharge, HTN, DM, CAD, cellulitis on Levaquin  on Bumex gtt

## 2024-09-03 NOTE — PROGRESS NOTE ADULT - PROBLEM SELECTOR PLAN 5
istop done. c/w oxycodone 5 qhs

## 2024-09-03 NOTE — PROGRESS NOTE ADULT - SUBJECTIVE AND OBJECTIVE BOX
63 year old Male with PMHx HFrEF s/p ICD, HTN, T2DM, s/p LLE amputation in [place] ~2 months ago. Pt presented to ED for ICD shock 8/25/24.  and SOB. Being followed by cardiology nephrology for CKD  Seen for anemia  PAST MEDICAL & SURGICAL HISTORY:  Stented coronary artery      Diabetes      AICD (automatic cardioverter/defibrillator) present      Hypertension      Heart failure with reduced ejection fraction      History of ischemic cardiomyopathy      History of COPD      H/O gastroesophageal reflux (GERD)      2019 novel coronavirus disease (COVID-19)      COVID-19 vaccine series completed      H/O vasectomy  20 yrs ago (2000)        Allergies    ceftriaxone (Rash)  doxepin (Other)  Zosyn (Pruritus)  vancomycin (Rash (Mild to Mod))    Intolerances      Social History:    Medications:  acetaminophen     Tablet .. 650 milliGRAM(s) Oral every 6 hours PRN Temp greater or equal to 38C (100.4F), Mild Pain (1 - 3)  aluminum hydroxide/magnesium hydroxide/simethicone Suspension 30 milliLiter(s) Oral every 4 hours PRN Dyspepsia  ammonium lactate 12% Lotion 1 Application(s) Topical every 12 hours  aspirin enteric coated 81 milliGRAM(s) Oral daily  buMETAnide IVPB 4 milliGRAM(s) IV Intermittent <User Schedule>  buMETAnide IVPB 12 milliGRAM(s) IV Intermittent <User Schedule>  clopidogrel Tablet 75 milliGRAM(s) Oral daily  dextrose 5%. 1000 milliLiter(s) IV Continuous <Continuous>  dextrose 5%. 1000 milliLiter(s) IV Continuous <Continuous>  dextrose 50% Injectable 25 Gram(s) IV Push once  dextrose 50% Injectable 12.5 Gram(s) IV Push once  dextrose Oral Gel 15 Gram(s) Oral once PRN Blood Glucose LESS THAN 70 milliGRAM(s)/deciliter  fentaNYL    Injectable 25 MICROGram(s) IV Push every 5 minutes PRN Moderate Pain (4 - 6)  hydrALAZINE 25 milliGRAM(s) Oral three times a day  HYDROmorphone  Injectable 0.25 milliGRAM(s) IV Push every 10 minutes PRN Severe Pain (7 - 10)  insulin glargine Injectable (LANTUS) 30 Unit(s) SubCutaneous at bedtime  insulin lispro (ADMELOG) corrective regimen sliding scale   SubCutaneous three times a day before meals  insulin lispro (ADMELOG) corrective regimen sliding scale   SubCutaneous at bedtime  insulin lispro Injectable (ADMELOG) 10 Unit(s) SubCutaneous three times a day before meals  levoFLOXacin  Tablet 500 milliGRAM(s) Oral every 24 hours  melatonin 3 milliGRAM(s) Oral at bedtime PRN Insomnia  metoprolol succinate ER 25 milliGRAM(s) Oral daily  ondansetron Injectable 4 milliGRAM(s) IV Push every 6 hours PRN Nausea and/or Vomiting  oxyCODONE    IR 5 milliGRAM(s) Oral daily PRN Severe Pain (7 - 10)  oxyCODONE    IR 5 milliGRAM(s) Oral every 4 hours PRN Moderate Pain (4 - 6)  oxyCODONE    IR 10 milliGRAM(s) Oral every 6 hours PRN Severe Pain (7 - 10)  pantoprazole    Tablet 40 milliGRAM(s) Oral before breakfast  rosuvastatin 20 milliGRAM(s) Oral at bedtime  sacubitril 24 mG/valsartan 26 mG 1 Tablet(s) Oral every 12 hours  sodium chloride 0.65% Nasal 2 Spray(s) Both Nostrils four times a day  spironolactone 25 milliGRAM(s) Oral daily  tamsulosin 0.4 milliGRAM(s) Oral at bedtime    Labs:  CBC Full  -  ( 03 Sep 2024 07:31 )  WBC Count : 6.47 K/uL  RBC Count : 5.27 M/uL  Hemoglobin : 10.4 g/dL  Hematocrit : 37.3 %  Platelet Count - Automated : 221 K/uL  Mean Cell Volume : 70.8 fl  Mean Cell Hemoglobin : 19.7 pg  Mean Cell Hemoglobin Concentration : 27.9 gm/dL  Auto Neutrophil # : x  Auto Lymphocyte # : x  Auto Monocyte # : x  Auto Eosinophil # : x  Auto Basophil # : x  Auto Neutrophil % : x  Auto Lymphocyte % : x  Auto Monocyte % : x  Auto Eosinophil % : x  Auto Basophil % : x    09-03    140  |  97  |  50<H>  ----------------------------<  153<H>  3.5   |  28  |  1.83<H>    Ca    9.5      03 Sep 2024 07:31  Mg     2.3     09-02        Radiology:             ROS:  Patient comfortable without distress  No SOB or chest pain  No palpitation  No abdominal pain, diarrhaea or constipation  No weakness of extremities  No skin changes or swelling of legs  Rest of the comprehensive ROS was negative  Vital Signs Last 24 Hrs  T(C): 36.6 (03 Sep 2024 07:44), Max: 36.6 (02 Sep 2024 20:36)  T(F): 97.8 (03 Sep 2024 04:27), Max: 97.9 (02 Sep 2024 20:36)  HR: 104 (03 Sep 2024 11:50) (88 - 105)  BP: 91/54 (03 Sep 2024 11:40) (91/54 - 107/69)  BP(mean): 67 (03 Sep 2024 11:40) (67 - 86)  RR: 16 (03 Sep 2024 11:50) (15 - 18)  SpO2: 93% (03 Sep 2024 11:50) (92% - 98%)    Parameters below as of 03 Sep 2024 11:35  Patient On (Oxygen Delivery Method): nasal cannula  O2 Flow (L/min): 2      Physical exam:  Patient alert and oriented  No distress  CVS: S1, S2  Chest: bilateral breath sound without rales  Abdomen: soft, not tender, no organomegaly or masses  CNS: No focal neuro deficit  Musculoskeletal:  Normal range of motion  Skin: No rash    Assessment and Plan:Height (cm): 195.6 (09-03 @ 07:44)  Weight (kg): 119.7 (09-03 @ 07:44)  BMI (kg/m2): 31.3 (09-03 @ 07:44)  BSA (m2): 2.52 (09-03 @ 07:44)

## 2024-09-03 NOTE — PROGRESS NOTE ADULT - PROBLEM SELECTOR PROBLEM 5
Chronic pain

## 2024-09-03 NOTE — PROGRESS NOTE ADULT - ASSESSMENT
63 year old male with hx of ischemic cardiomyopathy with HFrEF 30%, cardiac arrest with prior transvenous ICD extraction for infection and subsequent [employer] S-ICD implant 2020, CKD 3, HTN, T2 DM, COPD, LUIS ALFREDO, and LLE amputee who presented after ICD shock. S-ICD Interrogation showed an inappropriate ICD shock for device oversensing (see procedure note for full interrogation). Patient denies prior ICD shocks in either devices that he has had.        1. Inappropriate S-ICD shock for device oversensing  2. Acute decompensated heart failure   3. right lower extremity Cellulitis, wound    - ICD remains deactivated for inappropriate device oversensing leading to inappropriate shock likely in setting of new RBBB on EKG which was not present in 2020 EKG  - Currently being treated for HF exacerbation with IV Bumex, spoke with HF team and he is optimized for tomorrow  - Plan for S-ICD removal and EV-ICD implant by Dr. Rodríguez today, keep patient NPO  - Please avoid Farxiga/ Jardiance medications in preparation for surgical procedure  - Ensure type & screen x 2 active    #[phone]    Addendum:    - s/p S-ICD extraction and EV-ICD implant today  - CXR PA/Lat in am  - Monitor ICD site for bleeding or hematoma  - Continue telemetry monitoring    #[phone]

## 2024-09-03 NOTE — PROGRESS NOTE ADULT - SUBJECTIVE AND OBJECTIVE BOX
24H hour events:     MEDICATIONS:  aspirin enteric coated 81 milliGRAM(s) Oral daily  buMETAnide IVPB 4 milliGRAM(s) IV Intermittent <User Schedule>  buMETAnide IVPB 12 milliGRAM(s) IV Intermittent <User Schedule>  clopidogrel Tablet 75 milliGRAM(s) Oral daily  hydrALAZINE 25 milliGRAM(s) Oral three times a day  metoprolol succinate ER 25 milliGRAM(s) Oral daily  sacubitril 24 mG/valsartan 26 mG 1 Tablet(s) Oral every 12 hours  spironolactone 25 milliGRAM(s) Oral daily    levoFLOXacin  Tablet 500 milliGRAM(s) Oral every 24 hours      acetaminophen     Tablet .. 650 milliGRAM(s) Oral every 6 hours PRN  fentaNYL    Injectable 25 MICROGram(s) IV Push every 5 minutes PRN  HYDROmorphone  Injectable 0.25 milliGRAM(s) IV Push every 10 minutes PRN  melatonin 3 milliGRAM(s) Oral at bedtime PRN  ondansetron Injectable 4 milliGRAM(s) IV Push every 6 hours PRN  oxyCODONE    IR 5 milliGRAM(s) Oral daily PRN  oxyCODONE    IR 5 milliGRAM(s) Oral every 4 hours PRN  oxyCODONE    IR 10 milliGRAM(s) Oral every 6 hours PRN    aluminum hydroxide/magnesium hydroxide/simethicone Suspension 30 milliLiter(s) Oral every 4 hours PRN  pantoprazole    Tablet 40 milliGRAM(s) Oral before breakfast    dextrose 50% Injectable 25 Gram(s) IV Push once  dextrose 50% Injectable 12.5 Gram(s) IV Push once  dextrose Oral Gel 15 Gram(s) Oral once PRN  insulin glargine Injectable (LANTUS) 30 Unit(s) SubCutaneous at bedtime  insulin lispro (ADMELOG) corrective regimen sliding scale   SubCutaneous three times a day before meals  insulin lispro (ADMELOG) corrective regimen sliding scale   SubCutaneous at bedtime  insulin lispro Injectable (ADMELOG) 10 Unit(s) SubCutaneous three times a day before meals  rosuvastatin 20 milliGRAM(s) Oral at bedtime    ammonium lactate 12% Lotion 1 Application(s) Topical every 12 hours  dextrose 5%. 1000 milliLiter(s) IV Continuous <Continuous>  dextrose 5%. 1000 milliLiter(s) IV Continuous <Continuous>  sodium chloride 0.65% Nasal 2 Spray(s) Both Nostrils four times a day  tamsulosin 0.4 milliGRAM(s) Oral at bedtime      REVIEW OF SYSTEMS:  Complete 12point ROS negative.    PHYSICAL EXAM:  T(C): 36.6 (09-03-24 @ 11:35), Max: 36.6 (09-02-24 @ 20:36)  HR: 100 (09-03-24 @ 12:30) (88 - 105)  BP: 91/54 (09-03-24 @ 11:40) (91/54 - 107/69)  RR: 16 (09-03-24 @ 12:30) (15 - 18)  SpO2: 100% (09-03-24 @ 12:30) (92% - 100%)  Wt(kg): --  I&O's Summary    02 Sep 2024 07:01  -  03 Sep 2024 07:00  --------------------------------------------------------  IN: 720 mL / OUT: 5825 mL / NET: -5105 mL        Appearance: Normal	  HEENT:   Normal oral mucosa, PERRL, EOMI	  Lymphatic: No lymphadenopathy  Cardiovascular: Normal S1 S2, No JVD, No murmurs, No edema  Respiratory: Lungs clear to auscultation	  Psychiatry: A & O x 3, Mood & affect appropriate  Gastrointestinal:  Soft, Non-tender, + BS	  Skin: No rashes, No ecchymoses, No cyanosis	  Neurologic: Non-focal  Extremities: Normal range of motion, No clubbing, cyanosis or edema  Vascular: Peripheral pulses palpable 2+ bilaterally        LABS:	 	    CBC Full  -  ( 03 Sep 2024 07:31 )  WBC Count : 6.47 K/uL  Hemoglobin : 10.4 g/dL  Hematocrit : 37.3 %  Platelet Count - Automated : 221 K/uL  Mean Cell Volume : 70.8 fl  Mean Cell Hemoglobin : 19.7 pg  Mean Cell Hemoglobin Concentration : 27.9 gm/dL  Auto Neutrophil # : x  Auto Lymphocyte # : x  Auto Monocyte # : x  Auto Eosinophil # : x  Auto Basophil # : x  Auto Neutrophil % : x  Auto Lymphocyte % : x  Auto Monocyte % : x  Auto Eosinophil % : x  Auto Basophil % : x    09-03    140  |  97  |  50<H>  ----------------------------<  153<H>  3.5   |  28  |  1.83<H>  09-02    139  |  97  |  50<H>  ----------------------------<  185<H>  3.9   |  27  |  1.91<H>    Ca    9.5      03 Sep 2024 07:31  Ca    9.4      02 Sep 2024 21:15  Mg     2.3     09-02  Mg     2.4     09-02        proBNP:   Lipid Profile:   HgA1c:   TSH:       CARDIAC MARKERS:          TELEMETRY: 	    ECG:  	  RADIOLOGY:  OTHER: 	    PREVIOUS DIAGNOSTIC TESTING:    [ ] Echocardiogram:    [ ]  Catheterization:  [ ] Stress Test:  	  	  ASSESSMENT/PLAN: 	     24H hour events:   Going to OR for S-ICD extraction with EV-ICD implant; no acute changes overnight    MEDICATIONS:  aspirin enteric coated 81 milliGRAM(s) Oral daily  buMETAnide IVPB 4 milliGRAM(s) IV Intermittent <User Schedule>  buMETAnide IVPB 12 milliGRAM(s) IV Intermittent <User Schedule>  clopidogrel Tablet 75 milliGRAM(s) Oral daily  hydrALAZINE 25 milliGRAM(s) Oral three times a day  metoprolol succinate ER 25 milliGRAM(s) Oral daily  sacubitril 24 mG/valsartan 26 mG 1 Tablet(s) Oral every 12 hours  spironolactone 25 milliGRAM(s) Oral daily  levoFLOXacin  Tablet 500 milliGRAM(s) Oral every 24 hours  acetaminophen     Tablet .. 650 milliGRAM(s) Oral every 6 hours PRN  fentaNYL    Injectable 25 MICROGram(s) IV Push every 5 minutes PRN  HYDROmorphone  Injectable 0.25 milliGRAM(s) IV Push every 10 minutes PRN  melatonin 3 milliGRAM(s) Oral at bedtime PRN  ondansetron Injectable 4 milliGRAM(s) IV Push every 6 hours PRN  oxyCODONE    IR 5 milliGRAM(s) Oral daily PRN  oxyCODONE    IR 5 milliGRAM(s) Oral every 4 hours PRN  oxyCODONE    IR 10 milliGRAM(s) Oral every 6 hours PRN  aluminum hydroxide/magnesium hydroxide/simethicone Suspension 30 milliLiter(s) Oral every 4 hours PRN  pantoprazole    Tablet 40 milliGRAM(s) Oral before breakfast  dextrose 50% Injectable 25 Gram(s) IV Push once  dextrose 50% Injectable 12.5 Gram(s) IV Push once  dextrose Oral Gel 15 Gram(s) Oral once PRN  insulin glargine Injectable (LANTUS) 30 Unit(s) SubCutaneous at bedtime  insulin lispro (ADMELOG) corrective regimen sliding scale   SubCutaneous three times a day before meals  insulin lispro (ADMELOG) corrective regimen sliding scale   SubCutaneous at bedtime  insulin lispro Injectable (ADMELOG) 10 Unit(s) SubCutaneous three times a day before meals  rosuvastatin 20 milliGRAM(s) Oral at bedtime  ammonium lactate 12% Lotion 1 Application(s) Topical every 12 hours  dextrose 5%. 1000 milliLiter(s) IV Continuous <Continuous>  dextrose 5%. 1000 milliLiter(s) IV Continuous <Continuous>  sodium chloride 0.65% Nasal 2 Spray(s) Both Nostrils four times a day  tamsulosin 0.4 milliGRAM(s) Oral at bedtime      REVIEW OF SYSTEMS:  Complete 12point ROS negative.    PHYSICAL EXAM:  T(C): 36.6 (09-03-24 @ 11:35), Max: 36.6 (09-02-24 @ 20:36)  HR: 100 (09-03-24 @ 12:30) (88 - 105)  BP: 91/54 (09-03-24 @ 11:40) (91/54 - 107/69)  RR: 16 (09-03-24 @ 12:30) (15 - 18)  SpO2: 100% (09-03-24 @ 12:30) (92% - 100%)  Wt(kg): --  I&O's Summary    02 Sep 2024 07:01  -  03 Sep 2024 07:00  --------------------------------------------------------  IN: 720 mL / OUT: 5825 mL / NET: -5105 mL        Appearance: Normal, in NAD	  Head: normocephalic, atraumatic  Neck: supple  Cardiovascular: RRR S1 S2, no m/r/g  Respiratory: Lungs clear to auscultation	  Psychiatry: A & O x 3, Mood & affect appropriate  Gastrointestinal:  Soft, Non-tender, + BS	  : voiding  Skin: No rashes, No ecchymoses	  Neurologic: Non-focal  Extremities: Normal range of motion, No clubbing, cyanosis or edema  Vascular: Peripheral pulses palpable 2+ bilaterally        LABS:	 	    CBC Full  -  ( 03 Sep 2024 07:31 )  WBC Count : 6.47 K/uL  Hemoglobin : 10.4 g/dL  Hematocrit : 37.3 %  Platelet Count - Automated : 221 K/uL  Mean Cell Volume : 70.8 fl  Mean Cell Hemoglobin : 19.7 pg  Mean Cell Hemoglobin Concentration : 27.9 gm/dL  Auto Neutrophil # : x  Auto Lymphocyte # : x  Auto Monocyte # : x  Auto Eosinophil # : x  Auto Basophil # : x  Auto Neutrophil % : x  Auto Lymphocyte % : x  Auto Monocyte % : x  Auto Eosinophil % : x  Auto Basophil % : x    09-03    140  |  97  |  50<H>  ----------------------------<  153<H>  3.5   |  28  |  1.83<H>  09-02    139  |  97  |  50<H>  ----------------------------<  185<H>  3.9   |  27  |  1.91<H>    Ca    9.5      03 Sep 2024 07:31  Ca    9.4      02 Sep 2024 21:15  Mg     2.3     09-02  Mg     2.4     09-02        proBNP:   Lipid Profile:   HgA1c:   TSH:       CARDIAC MARKERS:      TELEMETRY: Vpaced 90s  	     Statement Selected

## 2024-09-03 NOTE — PROGRESS NOTE ADULT - SUBJECTIVE AND OBJECTIVE BOX
Patient is a 63y old  Male who presents with a chief complaint of AICD firing (01 Sep 2024 11:21)      SUBJECTIVE / OVERNIGHT EVENTS:    Events noted.  CONSTITUTIONAL: No fever,  or fatigue  RESPIRATORY: No cough, wheezing,  No shortness of breath  CARDIOVASCULAR: No chest pain, palpitations  GASTROINTESTINAL: No abdominal or epigastric pain.     T(C): 36.9 (09-03-24 @ 20:34), Max: 36.9 (09-03-24 @ 20:34)  HR: 94 (09-03-24 @ 20:34) (76 - 105)  BP: 92/57 (09-03-24 @ 20:34) (89/55 - 106/56)  RR: 18 (09-03-24 @ 20:34) (15 - 18)  SpO2: 94% (09-03-24 @ 20:34) (92% - 100%)    MEDICATIONS  (STANDING):  ammonium lactate 12% Lotion 1 Application(s) Topical every 12 hours  aspirin enteric coated 81 milliGRAM(s) Oral daily  buMETAnide IVPB 4 milliGRAM(s) IV Intermittent <User Schedule>  buMETAnide IVPB 12 milliGRAM(s) IV Intermittent <User Schedule>  clopidogrel Tablet 75 milliGRAM(s) Oral daily  dextrose 5%. 1000 milliLiter(s) (100 mL/Hr) IV Continuous <Continuous>  dextrose 5%. 1000 milliLiter(s) (50 mL/Hr) IV Continuous <Continuous>  dextrose 50% Injectable 25 Gram(s) IV Push once  dextrose 50% Injectable 12.5 Gram(s) IV Push once  hydrALAZINE 25 milliGRAM(s) Oral three times a day  insulin glargine Injectable (LANTUS) 30 Unit(s) SubCutaneous at bedtime  insulin lispro (ADMELOG) corrective regimen sliding scale   SubCutaneous at bedtime  insulin lispro (ADMELOG) corrective regimen sliding scale   SubCutaneous three times a day before meals  insulin lispro Injectable (ADMELOG) 10 Unit(s) SubCutaneous three times a day before meals  levoFLOXacin  Tablet 500 milliGRAM(s) Oral every 24 hours  metoprolol succinate ER 25 milliGRAM(s) Oral daily  pantoprazole    Tablet 40 milliGRAM(s) Oral before breakfast  rosuvastatin 20 milliGRAM(s) Oral at bedtime  sacubitril 24 mG/valsartan 26 mG 1 Tablet(s) Oral every 12 hours  sodium chloride 0.65% Nasal 2 Spray(s) Both Nostrils four times a day  spironolactone 25 milliGRAM(s) Oral daily  tamsulosin 0.4 milliGRAM(s) Oral at bedtime    MEDICATIONS  (PRN):  acetaminophen     Tablet .. 650 milliGRAM(s) Oral every 6 hours PRN Temp greater or equal to 38C (100.4F), Mild Pain (1 - 3)  aluminum hydroxide/magnesium hydroxide/simethicone Suspension 30 milliLiter(s) Oral every 4 hours PRN Dyspepsia  dextrose Oral Gel 15 Gram(s) Oral once PRN Blood Glucose LESS THAN 70 milliGRAM(s)/deciliter  melatonin 3 milliGRAM(s) Oral at bedtime PRN Insomnia  oxyCODONE    IR 5 milliGRAM(s) Oral every 4 hours PRN Moderate Pain (4 - 6)  oxyCODONE    IR 10 milliGRAM(s) Oral every 6 hours PRN Severe Pain (7 - 10)    PHYSICAL EXAM:    NECK: Supple, No JVD  CHEST/LUNG: Clear to auscultation bilaterally; No wheezing.  HEART: Regular rate and rhythm; No murmurs, rubs, or gallops  ABDOMEN: Soft, Nontender, Nondistended; Bowel sounds present  EXTREMITIES:   No edema  NEUROLOGY: AAO  RADIOLOGY & ADDITIONAL TESTS:    Imaging Personally Reviewed:    Consultant(s) Notes Reviewed:      Care Discussed with Consultants/Other Providers:

## 2024-09-03 NOTE — PROGRESS NOTE ADULT - ASSESSMENT
63 year old Male with PMHx HFrEF s/p ICD, HTN, T2DM, s/p LLE amputation in [place] ~2 months ago. Pt presented to ED for ICD shock.        1- CKDIII  2- CHF  3- DM2  4- anemia  5- cellulitis       per chart review, creatinine ranges higher   creatinine is steady  appears euvolemic on exam  recommend transition to po bumex, 4mg daily.  aldactone 25 mg daily for CHF  non oliguric  ICD deactivated, EP following for possible removal and re-implant today  anemia, trend hgb, s/p venofer infusion    levofloxacin 500 mg daily for cellulitis   continue entresto 24/46 mg bid   trend bp  strict I/O  daily standing weight, improving  trend creatinine and electrolytes daily

## 2024-09-03 NOTE — PROGRESS NOTE ADULT - SUBJECTIVE AND OBJECTIVE BOX
[facility]   [phone]  RENAL FOLLOW UP NOTE  --------------------------------------------------------------------------------  Chief Complaint:    24 hour events/subjective:    patient seen and examined.   denies sob    PAST HISTORY  --------------------------------------------------------------------------------  No significant changes to PMH, PSH, FHx, SHx, unless otherwise noted    ALLERGIES & MEDICATIONS  --------------------------------------------------------------------------------  Allergies    ceftriaxone (Rash)  doxepin (Other)  Zosyn (Pruritus)  vancomycin (Rash (Mild to Mod))    Intolerances      Standing Inpatient Medications  ammonium lactate 12% Lotion 1 Application(s) Topical every 12 hours  aspirin enteric coated 81 milliGRAM(s) Oral daily  buMETAnide IVPB 4 milliGRAM(s) IV Intermittent <User Schedule>  buMETAnide IVPB 12 milliGRAM(s) IV Intermittent <User Schedule>  clopidogrel Tablet 75 milliGRAM(s) Oral daily  dextrose 5%. 1000 milliLiter(s) IV Continuous <Continuous>  dextrose 5%. 1000 milliLiter(s) IV Continuous <Continuous>  dextrose 50% Injectable 25 Gram(s) IV Push once  dextrose 50% Injectable 12.5 Gram(s) IV Push once  hydrALAZINE 25 milliGRAM(s) Oral three times a day  insulin glargine Injectable (LANTUS) 30 Unit(s) SubCutaneous at bedtime  insulin lispro (ADMELOG) corrective regimen sliding scale   SubCutaneous three times a day before meals  insulin lispro (ADMELOG) corrective regimen sliding scale   SubCutaneous at bedtime  insulin lispro Injectable (ADMELOG) 10 Unit(s) SubCutaneous three times a day before meals  levoFLOXacin  Tablet 500 milliGRAM(s) Oral every 24 hours  metoprolol succinate ER 25 milliGRAM(s) Oral daily  pantoprazole    Tablet 40 milliGRAM(s) Oral before breakfast  rosuvastatin 20 milliGRAM(s) Oral at bedtime  sacubitril 24 mG/valsartan 26 mG 1 Tablet(s) Oral every 12 hours  sodium chloride 0.65% Nasal 2 Spray(s) Both Nostrils four times a day  spironolactone 25 milliGRAM(s) Oral daily  tamsulosin 0.4 milliGRAM(s) Oral at bedtime    PRN Inpatient Medications  acetaminophen     Tablet .. 650 milliGRAM(s) Oral every 6 hours PRN  aluminum hydroxide/magnesium hydroxide/simethicone Suspension 30 milliLiter(s) Oral every 4 hours PRN  dextrose Oral Gel 15 Gram(s) Oral once PRN  fentaNYL    Injectable 25 MICROGram(s) IV Push every 5 minutes PRN  HYDROmorphone  Injectable 0.25 milliGRAM(s) IV Push every 10 minutes PRN  melatonin 3 milliGRAM(s) Oral at bedtime PRN  ondansetron Injectable 4 milliGRAM(s) IV Push every 6 hours PRN  oxyCODONE    IR 5 milliGRAM(s) Oral daily PRN  oxyCODONE    IR 5 milliGRAM(s) Oral every 4 hours PRN  oxyCODONE    IR 10 milliGRAM(s) Oral every 6 hours PRN      REVIEW OF SYSTEMS  --------------------------------------------------------------------------------    Gen: denies  fevers/chills,  CVS: denies chest pain/palpitations  Resp: denies SOB/Cough  GI: Denies N/V/Abd pain  : Denies dysuria    VITALS/PHYSICAL EXAM  --------------------------------------------------------------------------------  T(C): 36.6 (09-03-24 @ 11:35), Max: 36.6 (09-02-24 @ 20:36)  HR: 100 (09-03-24 @ 12:30) (88 - 105)  BP: 91/54 (09-03-24 @ 11:40) (91/54 - 107/69)  RR: 16 (09-03-24 @ 12:30) (15 - 18)  SpO2: 100% (09-03-24 @ 12:30) (92% - 100%)  Wt(kg): --  Height (cm): 195.6 (09-03-24 @ 07:44)  Weight (kg): 119.7 (09-03-24 @ 07:44)  BMI (kg/m2): 31.3 (09-03-24 @ 07:44)  BSA (m2): 2.52 (09-03-24 @ 07:44)      09-02-24 @ 07:01  -  09-03-24 @ 07:00  --------------------------------------------------------  IN: 720 mL / OUT: 5825 mL / NET: -5105 mL    09-03-24 @ 07:01  -  09-03-24 @ 13:09  --------------------------------------------------------  IN: 200 mL / OUT: 100 mL / NET: 100 mL      Physical Exam:  	  	Gen: Non toxic comfortable appearing   	Pulm: decrease bs  no rales or ronchi or wheezing  	CV: no JVD, RRR, S1S2; no rub  	Back: No CVA tenderness; no sacral edema  	Abd: +BS, soft, nontender/+distended, +abd wall edema, improved  	: No suprapubic tenderness  	UE: Warm, no cyanosis  no clubbing,  no edema;  	LE: Warm, no cyanosis  no clubbing, RLE chronic venous stasis, edema decreased, LLE BKA   	Neuro: alert and oriented. speech coherent     LABS/STUDIES  --------------------------------------------------------------------------------              10.4   6.47  >-----------<  221      [09-03-24 @ 07:31]              37.3     140  |  97  |  50  ----------------------------<  153      [09-03-24 @ 07:31]  3.5   |  28  |  1.83        Ca     9.5     [09-03-24 @ 07:31]      Mg     2.3     [09-02-24 @ 21:15]            Creatinine Trend:  SCr 1.83 [09-03 @ 07:31]  SCr 1.91 [09-02 @ 21:15]  SCr 1.70 [09-02 @ 12:32]  SCr 1.81 [09-01 @ 18:14]  SCr 1.70 [09-01 @ 07:37]            Iron 24, TIBC 395, %sat 6      [08-29-24 @ 06:04]  Ferritin 51      [08-29-24 @ 07:31]  HbA1c 8.9      [12-16-19 @ 08:15]    Free Light Chains: kappa 9.40, lambda 8.09, ratio = 1.16      [08-30 @ 06:49]

## 2024-09-03 NOTE — PROGRESS NOTE ADULT - SUBJECTIVE AND OBJECTIVE BOX
DATE OF SERVICE: 09-03-24 @ 13:40    Patient is a 63y old  Male who presents with a chief complaint of AICD firing (01 Sep 2024 11:21)      INTERVAL HISTORY:     REVIEW OF SYSTEMS:  CONSTITUTIONAL: No weakness  EYES/ENT: No visual changes;  No throat pain   NECK: No pain or stiffness  RESPIRATORY: No cough, wheezing; No shortness of breath  CARDIOVASCULAR: No chest pain or palpitations  GASTROINTESTINAL: No abdominal  pain. No nausea, vomiting, or hematemesis  GENITOURINARY: No dysuria, frequency or hematuria  NEUROLOGICAL: No stroke like symptoms  SKIN: No rashes    TELEMETRY Personally reviewed: SR/ST  	  MEDICATIONS:  buMETAnide IVPB 4 milliGRAM(s) IV Intermittent <User Schedule>  buMETAnide IVPB 12 milliGRAM(s) IV Intermittent <User Schedule>  hydrALAZINE 25 milliGRAM(s) Oral three times a day  metoprolol succinate ER 25 milliGRAM(s) Oral daily  sacubitril 24 mG/valsartan 26 mG 1 Tablet(s) Oral every 12 hours  spironolactone 25 milliGRAM(s) Oral daily        PHYSICAL EXAM:  T(C): 36.7 (09-03-24 @ 13:00), Max: 36.7 (09-03-24 @ 13:00)  HR: 99 (09-03-24 @ 13:00) (76 - 105)  BP: 94/51 (09-03-24 @ 13:00) (91/54 - 107/69)  RR: 16 (09-03-24 @ 13:00) (15 - 18)  SpO2: 99% (09-03-24 @ 13:00) (92% - 100%)  Wt(kg): --  I&O's Summary    02 Sep 2024 07:01  -  03 Sep 2024 07:00  --------------------------------------------------------  IN: 720 mL / OUT: 5825 mL / NET: -5105 mL    03 Sep 2024 07:01  -  03 Sep 2024 13:40  --------------------------------------------------------  IN: 200 mL / OUT: 200 mL / NET: 0 mL      Height (cm): 195.6 (09-03 @ 07:44)  Weight (kg): 119.7 (09-03 @ 07:44)  BMI (kg/m2): 31.3 (09-03 @ 07:44)  BSA (m2): 2.52 (09-03 @ 07:44)    Appearance: In no distress	  HEENT:    PERRL, EOMI	  Cardiovascular:  S1 S2, No JVD  Respiratory: Lungs clear to auscultation	  Gastrointestinal:  Soft, Non-tender, + BS	  Vascularature:  No edema of LE  Psychiatric: Appropriate affect   Neuro: no acute focal deficits                               10.4   6.47  )-----------( 221      ( 03 Sep 2024 07:31 )             37.3     09-03    140  |  97  |  50<H>  ----------------------------<  153<H>  3.5   |  28  |  1.83<H>    Ca    9.5      03 Sep 2024 07:31  Mg     2.3     09-02          Labs personally reviewed      ASSESSMENT/PLAN: 	        Problem/Plan - 1:  ·  Problem: Acute decompensated systolic heart failure.   ·  Plan:    - TTE 8/26 similar to prior wiith EF 20%  - GDMT Toprol 25mg daily  - Cont Entresto 24/26mg BID as BP tolerates  - Cont Aldactone 25mg  - Hydralazine 10mg PO TID initiated as per HF  - Farxiga/Jardiance 10mg daily following procedures/surgeries  - Appreciate HF recommendations; Possible CardioMems this admission ? pt prefers to do as outpt  - As per wife, dry weight ~247lbs, currently 253.9. 8/31 248 lbs today.   - per HF recs -improving volume status  - 9/2 decrease bumex gtt 0/5mg/hr during the day time per HF  - Cr stable/BUN uptrending.      Problem/Plan - 2:  ·  Problem: CAD (coronary artery disease).   ·  Plan: c/w asa and Plavix    Problem/Plan - 3:  ·  Problem: Chronic kidney disease, unspecified CKD stage.   ·  Plan: Monitor BMP daily, dose meds per renal fx, avoid nephrotoxins.    Problem/Plan - 4:  ·  Problem: ICD shock   ·  Plan: Inappropriate as per EP  - settings adjusted  - s/p ICD extraction and re-implant, (9/3)  - Current ICD disabled--> pacing pads on patient    Problem/Plan - 5:  ·  Problem: Prophylactic measure.   ·  Plan: VTE ppx: hep sq         Gertrude Villalobos, AG-NP   Shawn Barnes,  [facility]  Cardiovascular Medicine  [address] [place] [address], [address]  Available through call or text on [employer] TEAMs  Office: [phone]   DATE OF SERVICE: 09-03-24 @ 13:40    Patient is a 63y old  Male who presents with a chief complaint of AICD firing (01 Sep 2024 11:21)      INTERVAL HISTORY: Feels ok.     REVIEW OF SYSTEMS:  CONSTITUTIONAL: No weakness  EYES/ENT: No visual changes;  No throat pain   NECK: No pain or stiffness  RESPIRATORY: No cough, wheezing; No shortness of breath  CARDIOVASCULAR: No chest pain or palpitations  GASTROINTESTINAL: No abdominal  pain. No nausea, vomiting, or hematemesis  GENITOURINARY: No dysuria, frequency or hematuria  NEUROLOGICAL: No stroke like symptoms  SKIN: No rashes    TELEMETRY Personally reviewed: SR/ST  	  MEDICATIONS:  buMETAnide IVPB 4 milliGRAM(s) IV Intermittent <User Schedule>  buMETAnide IVPB 12 milliGRAM(s) IV Intermittent <User Schedule>  hydrALAZINE 25 milliGRAM(s) Oral three times a day  metoprolol succinate ER 25 milliGRAM(s) Oral daily  sacubitril 24 mG/valsartan 26 mG 1 Tablet(s) Oral every 12 hours  spironolactone 25 milliGRAM(s) Oral daily        PHYSICAL EXAM:  T(C): 36.7 (09-03-24 @ 13:00), Max: 36.7 (09-03-24 @ 13:00)  HR: 99 (09-03-24 @ 13:00) (76 - 105)  BP: 94/51 (09-03-24 @ 13:00) (91/54 - 107/69)  RR: 16 (09-03-24 @ 13:00) (15 - 18)  SpO2: 99% (09-03-24 @ 13:00) (92% - 100%)  Wt(kg): --  I&O's Summary    02 Sep 2024 07:01  -  03 Sep 2024 07:00  --------------------------------------------------------  IN: 720 mL / OUT: 5825 mL / NET: -5105 mL    03 Sep 2024 07:01  -  03 Sep 2024 13:40  --------------------------------------------------------  IN: 200 mL / OUT: 200 mL / NET: 0 mL      Height (cm): 195.6 (09-03 @ 07:44)  Weight (kg): 119.7 (09-03 @ 07:44)  BMI (kg/m2): 31.3 (09-03 @ 07:44)  BSA (m2): 2.52 (09-03 @ 07:44)    Appearance: In no distress	  HEENT:    PERRL, EOMI	  Cardiovascular:  S1 S2, No JVD  Respiratory: Lungs clear to auscultation	  Gastrointestinal:  Soft, Non-tender, + BS	  Vascularature:  No edema of LE  Psychiatric: Appropriate affect   Neuro: no acute focal deficits                               10.4   6.47  )-----------( 221      ( 03 Sep 2024 07:31 )             37.3     09-03    140  |  97  |  50<H>  ----------------------------<  153<H>  3.5   |  28  |  1.83<H>    Ca    9.5      03 Sep 2024 07:31  Mg     2.3     09-02          Labs personally reviewed      ASSESSMENT/PLAN: 	        Problem/Plan - 1:  ·  Problem: Acute decompensated systolic heart failure.   ·  Plan:    - TTE 8/26 similar to prior wiith EF 20%  - GDMT Toprol 25mg daily  - Cont Entresto 24/26mg BID as BP tolerates  - Cont Aldactone 25mg  - Hydralazine 10mg PO TID initiated as per HF  - Farxiga/Jardiance 10mg daily following procedures/surgeries  - Appreciate HF recommendations; Possible CardioMems this admission ? pt prefers to do as outpt  - As per wife, dry weight ~247lbs, currently 253.9. 8/31 248 lbs today.   - per HF recs -improving volume status  - Bumex 12mg IVPB q12 hours as per HR  - Cr stable/BUN uptrending.      Problem/Plan - 2:  ·  Problem: CAD (coronary artery disease).   ·  Plan: c/w asa and Plavix    Problem/Plan - 3:  ·  Problem: Chronic kidney disease, unspecified CKD stage.   ·  Plan: Monitor BMP daily, dose meds per renal fx, avoid nephrotoxins.    Problem/Plan - 4:  ·  Problem: ICD shock   ·  Plan: Inappropriate as per EP  - settings adjusted  - s/p ICD extraction and re-implant, (9/3)  - Current ICD disabled--> pacing pads on patient    Problem/Plan - 5:  ·  Problem: Prophylactic measure.   ·  Plan: VTE ppx: hep sq         Gertrude Villalobos, AG-NP   Shawn Barnes DO [facility]  Cardiovascular Medicine  [address] [place] [address], [address]  Available through call or text on [employer] TEAMs  Office: [phone]   DATE OF SERVICE: 09-03-24 @ 13:40    Patient is a 63y old  Male who presents with a chief complaint of AICD firing (01 Sep 2024 11:21)      INTERVAL HISTORY: Feels ok.     REVIEW OF SYSTEMS:  CONSTITUTIONAL: No weakness  EYES/ENT: No visual changes;  No throat pain   NECK: No pain or stiffness  RESPIRATORY: No cough, wheezing; No shortness of breath  CARDIOVASCULAR: No chest pain or palpitations  GASTROINTESTINAL: No abdominal  pain. No nausea, vomiting, or hematemesis  GENITOURINARY: No dysuria, frequency or hematuria  NEUROLOGICAL: No stroke like symptoms  SKIN: No rashes    TELEMETRY Personally reviewed: SR/ST  	  MEDICATIONS:  buMETAnide IVPB 4 milliGRAM(s) IV Intermittent <User Schedule>  buMETAnide IVPB 12 milliGRAM(s) IV Intermittent <User Schedule>  hydrALAZINE 25 milliGRAM(s) Oral three times a day  metoprolol succinate ER 25 milliGRAM(s) Oral daily  sacubitril 24 mG/valsartan 26 mG 1 Tablet(s) Oral every 12 hours  spironolactone 25 milliGRAM(s) Oral daily        PHYSICAL EXAM:  T(C): 36.7 (09-03-24 @ 13:00), Max: 36.7 (09-03-24 @ 13:00)  HR: 99 (09-03-24 @ 13:00) (76 - 105)  BP: 94/51 (09-03-24 @ 13:00) (91/54 - 107/69)  RR: 16 (09-03-24 @ 13:00) (15 - 18)  SpO2: 99% (09-03-24 @ 13:00) (92% - 100%)  Wt(kg): --  I&O's Summary    02 Sep 2024 07:01  -  03 Sep 2024 07:00  --------------------------------------------------------  IN: 720 mL / OUT: 5825 mL / NET: -5105 mL    03 Sep 2024 07:01  -  03 Sep 2024 13:40  --------------------------------------------------------  IN: 200 mL / OUT: 200 mL / NET: 0 mL      Height (cm): 195.6 (09-03 @ 07:44)  Weight (kg): 119.7 (09-03 @ 07:44)  BMI (kg/m2): 31.3 (09-03 @ 07:44)  BSA (m2): 2.52 (09-03 @ 07:44)    Appearance: In no distress	  HEENT:    PERRL, EOMI	  Cardiovascular:  S1 S2, No JVD  Respiratory: Lungs clear to auscultation	  Gastrointestinal:  Soft, Non-tender, + BS	  Vascularature:  No edema of LE  Psychiatric: Appropriate affect   Neuro: no acute focal deficits                               10.4   6.47  )-----------( 221      ( 03 Sep 2024 07:31 )             37.3     09-03    140  |  97  |  50<H>  ----------------------------<  153<H>  3.5   |  28  |  1.83<H>    Ca    9.5      03 Sep 2024 07:31  Mg     2.3     09-02          Labs personally reviewed      ASSESSMENT/PLAN: 	    Problem/Plan - 1:  ·  Problem: Acute decompensated systolic heart failure.   ·  Plan:    - TTE 8/26 similar to prior wiith EF 20%  - GDMT Toprol 25mg daily  - Cont Entresto 24/26mg BID as BP tolerates  - Cont Aldactone 25mg  - Hydralazine 10mg PO TID initiated as per HF  - Farxiga/Jardiance 10mg daily following procedures/surgeries  - Appreciate HF recommendations; Possible CardioMems this admission ? pt prefers to do as outpt  - As per wife, dry weight ~247lbs, currently 253.9. 8/31 248 lbs today.   - per HF recs -improving volume status  - Bumex 12mg IVPB q12 hours as per HR  - Cr stable/BUN uptrending.      Problem/Plan - 2:  ·  Problem: CAD (coronary artery disease).   ·  Plan: c/w asa and Plavix    Problem/Plan - 3:  ·  Problem: Chronic kidney disease, unspecified CKD stage.   ·  Plan: Monitor BMP daily, dose meds per renal fx, avoid nephrotoxins.    Problem/Plan - 4:  ·  Problem: ICD shock   ·  Plan: Inappropriate as per EP  - settings adjusted  - s/p ICD extraction and re-implant, (9/3)  - Current ICD disabled--> pacing pads on patient    Problem/Plan - 5:  ·  Problem: Prophylactic measure.   ·  Plan: VTE ppx: hep sq         Gertrude Villalobos, AG-NP   Shawn Barnes DO [facility]  Cardiovascular Medicine  [address] [place] [address], [address]  Available through call or text on [employer] TEAMs  Office: [phone]

## 2024-09-03 NOTE — PROGRESS NOTE ADULT - PROBLEM SELECTOR PLAN 6
VTE ppx: hep sq   GI ppx: n/i

## 2024-09-04 ENCOUNTER — APPOINTMENT (OUTPATIENT)
Dept: ELECTROPHYSIOLOGY | Facility: CLINIC | Age: 64
End: 2024-09-04

## 2024-09-04 ENCOUNTER — RESULT REVIEW (OUTPATIENT)
Age: 64
End: 2024-09-04

## 2024-09-04 LAB
ANION GAP SERPL CALC-SCNC: 13 MMOL/L — SIGNIFICANT CHANGE UP (ref 5–17)
BUN SERPL-MCNC: 66 MG/DL — HIGH (ref 7–23)
CALCIUM SERPL-MCNC: 8.7 MG/DL — SIGNIFICANT CHANGE UP (ref 8.4–10.5)
CHLORIDE SERPL-SCNC: 95 MMOL/L — LOW (ref 96–108)
CO2 SERPL-SCNC: 29 MMOL/L — SIGNIFICANT CHANGE UP (ref 22–31)
CREAT SERPL-MCNC: 2.34 MG/DL — HIGH (ref 0.5–1.3)
EGFR: 31 ML/MIN/1.73M2 — LOW
GAS PNL BLDV: SIGNIFICANT CHANGE UP
GLUCOSE BLDC GLUCOMTR-MCNC: 168 MG/DL — HIGH (ref 70–99)
GLUCOSE BLDC GLUCOMTR-MCNC: 215 MG/DL — HIGH (ref 70–99)
GLUCOSE BLDC GLUCOMTR-MCNC: 216 MG/DL — HIGH (ref 70–99)
GLUCOSE BLDC GLUCOMTR-MCNC: 237 MG/DL — HIGH (ref 70–99)
GLUCOSE BLDC GLUCOMTR-MCNC: 238 MG/DL — HIGH (ref 70–99)
GLUCOSE BLDC GLUCOMTR-MCNC: 242 MG/DL — HIGH (ref 70–99)
GLUCOSE SERPL-MCNC: 237 MG/DL — HIGH (ref 70–99)
LACTATE SERPL-SCNC: 1.6 MMOL/L — SIGNIFICANT CHANGE UP (ref 0.5–2)
MAGNESIUM SERPL-MCNC: 2.3 MG/DL — SIGNIFICANT CHANGE UP (ref 1.6–2.6)
NT-PROBNP SERPL-SCNC: 4585 PG/ML — HIGH (ref 0–300)
POTASSIUM SERPL-MCNC: 3.6 MMOL/L — SIGNIFICANT CHANGE UP (ref 3.5–5.3)
POTASSIUM SERPL-SCNC: 3.6 MMOL/L — SIGNIFICANT CHANGE UP (ref 3.5–5.3)
SODIUM SERPL-SCNC: 137 MMOL/L — SIGNIFICANT CHANGE UP (ref 135–145)

## 2024-09-04 PROCEDURE — 99233 SBSQ HOSP IP/OBS HIGH 50: CPT | Mod: 25

## 2024-09-04 PROCEDURE — 93308 TTE F-UP OR LMTD: CPT | Mod: 26

## 2024-09-04 PROCEDURE — 99233 SBSQ HOSP IP/OBS HIGH 50: CPT

## 2024-09-04 RX ORDER — METHYLPREDNISOLONE 4 MG
125 TABLET ORAL ONCE
Refills: 0 | Status: COMPLETED | OUTPATIENT
Start: 2024-09-04 | End: 2024-09-04

## 2024-09-04 RX ORDER — SODIUM CHLORIDE 9 MG/ML
250 INJECTION INTRAMUSCULAR; INTRAVENOUS; SUBCUTANEOUS ONCE
Refills: 0 | Status: COMPLETED | OUTPATIENT
Start: 2024-09-04 | End: 2024-09-04

## 2024-09-04 RX ORDER — ALBUMIN (HUMAN) 5 G/20ML
250 SOLUTION INTRAVENOUS ONCE
Refills: 0 | Status: COMPLETED | OUTPATIENT
Start: 2024-09-04 | End: 2024-09-04

## 2024-09-04 RX ORDER — HYDROCORTISONE 1 %
1 OINTMENT (GRAM) TOPICAL
Refills: 0 | Status: DISCONTINUED | OUTPATIENT
Start: 2024-09-04 | End: 2024-09-09

## 2024-09-04 RX ORDER — DIPHENHYDRAMINE HCL 50 MG
25 CAPSULE ORAL ONCE
Refills: 0 | Status: COMPLETED | OUTPATIENT
Start: 2024-09-04 | End: 2024-09-04

## 2024-09-04 RX ORDER — DIPHENHYDRAMINE HCL 50 MG
25 CAPSULE ORAL EVERY 4 HOURS
Refills: 0 | Status: DISCONTINUED | OUTPATIENT
Start: 2024-09-04 | End: 2024-09-09

## 2024-09-04 RX ADMIN — Medication 25 MILLIGRAM(S): at 22:36

## 2024-09-04 RX ADMIN — Medication 2 SPRAY(S): at 23:24

## 2024-09-04 RX ADMIN — ACETAMINOPHEN 650 MILLIGRAM(S): 325 TABLET ORAL at 20:55

## 2024-09-04 RX ADMIN — Medication 125 MILLIGRAM(S): at 18:01

## 2024-09-04 RX ADMIN — Medication 10 UNIT(S): at 12:27

## 2024-09-04 RX ADMIN — Medication 2 SPRAY(S): at 10:29

## 2024-09-04 RX ADMIN — Medication 1 APPLICATION(S): at 18:11

## 2024-09-04 RX ADMIN — Medication 40 MILLIGRAM(S): at 06:28

## 2024-09-04 RX ADMIN — ROSUVASTATIN CALCIUM 20 MILLIGRAM(S): 10 TABLET ORAL at 22:14

## 2024-09-04 RX ADMIN — ALBUMIN (HUMAN) 125 MILLILITER(S): 5 SOLUTION INTRAVENOUS at 12:25

## 2024-09-04 RX ADMIN — Medication 2: at 12:26

## 2024-09-04 RX ADMIN — TAMSULOSIN HYDROCHLORIDE 0.4 MILLIGRAM(S): 0.4 CAPSULE ORAL at 21:50

## 2024-09-04 RX ADMIN — ACETAMINOPHEN 650 MILLIGRAM(S): 325 TABLET ORAL at 19:55

## 2024-09-04 RX ADMIN — Medication 25 MILLIGRAM(S): at 16:51

## 2024-09-04 RX ADMIN — Medication 1 APPLICATION(S): at 06:26

## 2024-09-04 RX ADMIN — Medication 75 MILLIGRAM(S): at 12:28

## 2024-09-04 RX ADMIN — Medication 2 SPRAY(S): at 17:44

## 2024-09-04 RX ADMIN — SODIUM CHLORIDE 250 MILLILITER(S): 9 INJECTION INTRAMUSCULAR; INTRAVENOUS; SUBCUTANEOUS at 15:54

## 2024-09-04 RX ADMIN — METOPROLOL TARTRATE 25 MILLIGRAM(S): 100 TABLET ORAL at 06:28

## 2024-09-04 RX ADMIN — Medication 25 MILLIGRAM(S): at 10:29

## 2024-09-04 RX ADMIN — SODIUM CHLORIDE 250 MILLILITER(S): 9 INJECTION INTRAMUSCULAR; INTRAVENOUS; SUBCUTANEOUS at 16:52

## 2024-09-04 RX ADMIN — Medication 1: at 18:01

## 2024-09-04 RX ADMIN — Medication 81 MILLIGRAM(S): at 12:28

## 2024-09-04 RX ADMIN — Medication 10 UNIT(S): at 07:49

## 2024-09-04 RX ADMIN — Medication 10 UNIT(S): at 18:02

## 2024-09-04 RX ADMIN — Medication 2: at 07:48

## 2024-09-04 RX ADMIN — INSULIN GLARGINE 30 UNIT(S): 100 INJECTION, SOLUTION SUBCUTANEOUS at 21:49

## 2024-09-04 RX ADMIN — Medication 2 SPRAY(S): at 06:27

## 2024-09-04 NOTE — PROVIDER CONTACT NOTE (CRITICAL VALUE NOTIFICATION) - ASSESSMENT
Pt A&Ox4, able to make needs known. Pt asymptomatic. Pt hypotensive, other vitals stable. No s/s of bleeding. Pt denies cp, denies SOB, denies pain.

## 2024-09-04 NOTE — RAPID RESPONSE TEAM SUMMARY - NSOTHERINTERVENTIONSRRT_GEN_ALL_CORE
upon entering the room, pt appears alert and oriented *3 denied any dizziness, lightheadedness. endorsed lots of urine output. IV albumin 250 was started. pe showed dry volume status. HF team present, also recommending adequate fluid resuscitation.  labs including lactate and pro BNP was drawn , primary team will follow up . 
RRT called for hypotension with sBP in 50-60s. Patient appeared AAOx3 but endorsed mild dizziness. Vitals otherwise stable, labs reviewed, physical exam concerning for dry volume status iso HF history. 250cc bolus of NS was given with subsequent improvement of BP to sBP 80s. HF team had further seen the patient earlier and recommended to hold all HF medications at this time. Consider repeat small volume fluid bolus (e.g. 250cc) if hypotensive. Primary team should follow up HF recommendations.

## 2024-09-04 NOTE — PROGRESS NOTE ADULT - ASSESSMENT
63 y.o. male with hx of ischemic cardiomyopathy with HFrEF 30%, cardiac arrest with prior transvenous ICD extraction for infection and subsequent [employer] S-ICD implant 2020, CKD 3, HTN, T2 DM, COPD, LUIS ALFREDO, and LLE amputee who presented after ICD shock.    Interrogation of S-ICD with inappropriate shock due to oversensing.  Pending S-ICD extraction and EV-ICD reimplantation w/ EP.  HF consulted for decompensated heart failure.    TTE 8/26/24   1. Left ventricular cavity is severely dilated. Left ventricular wall thickness is normal. Left ventricular systolic function is severely decreased with an ejection fraction of 22 % by Bell's method of disks. Regional wall motion abnormalities present.   2. Multiple segmental abnormalities exist. See findings.   3. Normal right ventricular cavity size, with normal wall thickness, and probably normal right ventricular systolic function.   4. Estimated pulmonary artery systolic pressure is 30 mmHg.   5. Trace pericardial effusion.    [facility] 11/18/19  LM:  --  LM: Normal.  LAD:   --  Ostial LAD: There was a 100 % stenosis.  CX:   --  Distal circumflex: There was a 80 % stenosis.  RI:   --  Ramus intermedius: Normal. There was no significant restenosis.  RCA:   --  Mid RCA: There was a 50 % stenosis.

## 2024-09-04 NOTE — RAPID RESPONSE TEAM SUMMARY - NSSITUATIONBACKGROUNDRRT_GEN_ALL_CORE
[facility] Occupational Therapy  Hand and Wrist  Evaluation     Date: 8/7/2020  Name: Haroon Hernadez  Clinic Number: [id number]    Therapy Diagnosis:   Encounter Diagnoses   Name Primary?    Cellulitis and abscess of hand     Weakness     Decreased range of motion      Physician: Juan Avery NP    Physician Orders: Evaluate and treat ; 2x/wk x 6 wks , Modalities prn  Medical Diagnosis: L03.119,L02.519 (ICD-10-CM) - Cellulitis and abscess of hand  Evaluation Date: 8/7/2020  Insurance Authorization Expiration date : 12-  Plan of Care Certification Period:   Date of Return to MD: Norman Reeves      Visit # / Visits authorized: 1 / 20  Time In: 12:45  Time Out: 1:15  Total Billable Time: 45 minutes    Precautions:  Standard and Fall    Subjective       Involved Side: right hand   Dominant Side: Right  Date of Onset: 7-  History of Current Condition/Mechanism of Injury: This started after kayaking in [facility] and cutting hand on broken glass. He required surgical I&D due to retained foreign body  Imaging: Please see image report   Surgical Procedure and Date: 7- ; I&D      Past Medical History/Physical Systems Review:   Haroon Hernadez  has a past medical history of Anxiety, Gilbert disease, and Urticaria.    Haroon Hernadez  has a past surgical history that includes plyoric; Ulnar nerve repair; [place] tooth extraction; Incision and drainage (Right, 7/18/2020); and Removal of foreign body from hand (7/18/2020).    Haroon has a current medication list which includes the following prescription(s): cholecalciferol (vitamin d3), ciprofloxacin hcl, doxycycline, fluarix quad [id number] (pf), mupirocin, triamcinolone acetonide 0.5%, triamcinolone acetonide 0.5%, and vitamin d2.    Review of patient's allergies indicates:  No Known Allergies         Pain:  Functional Pain Scale Rating 0-10:   3/10 on current  3/10 at best  3/10 at worst  Location: Right thumb 
63 year old male with hx of ischemic cardiomyopathy with HFrEF 30%, cardiac arrest with prior transvenous ICD extraction for infection and subsequent [employer] S-ICD implant 2020, CKD 3, HTN, T2 DM, COPD, LUIS ALFREDO, and LLE amputee who presented after ICD shock. S-ICD Interrogation showed an inappropriate ICD shock for device oversensing (see procedure note for full interrogation). Patient denies prior ICD shocks in either devices that he has had.    Patient s/p S-ICD extraction  with EV-ICD implant yesterday 9/3 ([employer]). RRT called earlier in day for hypotension, improved s/p albumin 5% in 250cc.
and left arm     Patient's Goals for Therapy:  to increase motion, reduce pain, return to normal function of hand     Occupation:   Work development   Working presently: employed  Duties: typing     Functional Limitations/Social History:    Previous functional status includes: Independent with all ADLs.     Current FunctionalStatus   Home/Living environment : lives with their girlfriend      Limitation of Functional Status as follows:   ADLs/IADLs:     - able to use right hand , not for fine motor skills     Leisure: Kayaking, lifting weights   Objective       Observation:   Skin intact, Skin dry and Joint stiffness    Sensation: Median Nerve Distribution   8/7/2020 8/7/2020    Left Right   Monofilament Testing     Normal 1.65-2.83 Present Present   Diminished Light Touch 3.22-3.61 Present Present   Diminished Protective 3.84-4.31 Present Present   Loss of Protective 4.56-6.65 Present Present   Untestable >6.65 Present Present       Wound Assessment:   Closed  Size: about 1 inch  Location: right thenar eminence incision        Range of Motion:   Right Active   8/7/2020   THUMB                          MP Ext/Flex -5/40                         IP Ext/Flex 0/75                         [place] ABD 41                         Radial ABD 45                         Opposition full     Comments:      Wrist ROM: Right  Active   8/7/2020   Extension full   Flexion 55          Strength: (TAD Dynamometer in lbs.) Average 3 trials, Position II:     8/7/2020 8/7/2020   Rung2 Right  Left   TAD # 2 69# 75#               Treatment     Treatment Time In/out  (separate from total time) : 10 minutes at the end of the hour     Home Exercise Program/Education:  Issued HEP (see patient instructions in EMR) and educated on modality use for pain management . Exercises were reviewed and Haroon was able to demonstrate them prior to the end of the session.   Pt received a written copy of exercises to perform at home. Haroon demonstrated 
good  understanding of the education provided.  Pt was advised to perform these exercises free of pain, and to stop performing them if pain occurs.    Patient/Family Education: role of OT, goals for OT, double booking , scheduling/cancellations - pt verbalized understanding. Discussed insurance limitations with patient.    Additional Education provided: role of CHT/OT, goals for CHT/OT, discussed insurance limitation with patient- patient verbalized understanding       Performed scar massage to  Right thumb eminence  area for 8  minutes  to decrease adhesions and improve tensile glide.       Assessment     This 33 y.o. male referred to Outpatient Occupational Therapy with diagnosis of   Encounter Diagnoses   Name Primary?    Cellulitis and abscess of hand     Weakness     Decreased range of motion     presents with limitations as described in problem list. Patient can benefit from Occupational Therapy services for Ultrasound, moist heat, AAROM, AROM, Theraputic exercises, joint mobs, home exercise program provied with written instructions, ice, strengthening and UBE and Orthosis, if deemed necessary . The following goals were discussed with the patient and he is in agreement with them as to be addressed in the treatment plan.     Problem List:   Decreased function of Right UE, Increased pain and Decreased strength      Anticipated barriers to occupational therapy: N/A  Pt has no cultural, educational or language barriers to learning provided.        Profile and History Assessment of Occupational Performance Level of Clinical Decision Making Complexity Score   Occupational Profile:   Haroon Hernadez is a 33 y.o. male who lives with their spouse and is currently employed Haroon Hernadez has difficulty with  ADLs and IADLs as listed previously, which  affecting his/her daily functional abilities.      Comorbidities:    has a past medical history of Anxiety, Gilbert disease, and Urticaria.    Medical and 
Therapy History Review:   Brief               Performance Deficits    Physical:  No Deficits    Cognitive:  No Deficits    Psychosocial:    No Deficits     Clinical Decision Making:  moderate    Assessment Process:  Problem-Focused Assessments    Modification/Need for Assistance:  Not Necessary    Intervention Selection:  Limited Treatment Options       low  Based on PMHX, co morbidities , data from assessments and functional level of assistance required with task and clinical presentation directly impacting function.         The following goals were discussed with the patient and patient is in agreement with them as to be addressed in the treatment plan.     Goals:       Goals to be met in 6-8  weeks:    1) Patient to be IND with HEP and modalities for pain managment.  2) Patient will  Increase Active Range of motion 15-20 degrees in hand/wrist to increase functional hand use for ADLs/work/leisure activities.  3)Pt will increase  strength 10-20 lbs. to improve functional grasp for ADLs/work/leisure activities.   4) Patient will decrease complaints of pain to   out of 10 to increase functional hand use for ADL/work/leisure activities.   5) Pt will reduce edema by 0.2-0.3 inches to improve motion  6) Pt will increase pinch strength in all 3 limited positions by 1-3 psi to assist with manipulation and fine motor task        Plan     Plan    Certification Period/Plan of care expiration: 8/7/2020 to 9/30/2020.    Outpatient Occupational Therapy 1 times weekly for 4 weeks to include the following interventions: Paraffin, Fluidotherapy, Manual therapy/joint mobilizations, Modalities for pain management, US 3 mhz, Therapeutic exercises/activities., Strengthening, Orthotic Fabrication/Fit/Training, Edema Control, Scar Management, Wound Care and Joint Protection.      Brittany Ferrair, MOT,  OTR/L, CHT  Occupational therapist, Certified Hand Therapist        
63 year old male with hx of ischemic cardiomyopathy with HFrEF 30%, cardiac arrest with prior transvenous ICD extraction for infection and subsequent [employer] S-ICD implant 2020, CKD 3, HTN, T2 DM, COPD, LUIS ALFREDO, and LLE amputee who presented after ICD shock. S-ICD Interrogation showed an inappropriate ICD shock for device oversensing (see procedure note for full interrogation). Patient denies prior ICD shocks in either devices that he has had.    Patient s/p S-ICD extraction  with EV-ICD implant yesterday 9/3 ([employer])

## 2024-09-04 NOTE — CHART NOTE - NSCHARTNOTEFT_GEN_A_CORE
HPI:  63M PMHx HFrEF s/p ICD, HTN, T2DM, s/p LLE amputation   Pt presented w/ ICD shock 30min prior to arrival to ED. Pt reports he's been having progressively worsening GREWAL, orthopnea and SOB for the past few days.     In the ED, BP on the softer side, otherwise VSS. OK on RA.   CBC w/ wbc 7.2, hgb 9.3, plt 147.   CMP w/ SCr 1.66, LFT unremarkable. Trop 77, 74. proBNP 3255.   CXR w/ RLL opacity, cannot r/o infection.     ICD interrogated by EP in the ED, ICD oversensing and shock was inappropriate.   Pt also found w/ new RBBB.   ED rec c/w beta blockers.   Pt received full dose asa and bumex in the ED for fluid overload.   Admit for ADHF.  (25 Aug 2024 00:08)    Notified by RN pt was hypotensive BP 74/43. RRT subsequently called. Pt received albumin 5% 250 cc x 1 to assist in improving BP. BP remained in 70s-80s systolic, however pt remained asymptomatic. Heart failure team assessed at bedside, stating pt is likely overdiuresed and bumex gtt stopped. Per HF and rapid team, will give small IVF boluses 250 cc slowly as needed if BP continues to drop. Discussed w/ medicine attending Dr. Lowery. Spouse Emma notified and updated of events. Will continue to monitor.     Vital Signs Last 24 Hrs  T(C): 36.5 (04 Sep 2024 10:35), Max: 36.9 (03 Sep 2024 20:34)  T(F): 97.7 (04 Sep 2024 10:35), Max: 98.5 (03 Sep 2024 20:34)  HR: 90 (04 Sep 2024 10:35) (76 - 105)  BP: 74/43 (04 Sep 2024 10:35) (74/43 - 106/56)  BP(mean): 75 (03 Sep 2024 14:00) (65 - 75)  RR: 18 (04 Sep 2024 10:35) (15 - 18)  SpO2: 92% (04 Sep 2024 10:35) (91% - 100%)    Parameters below as of 04 Sep 2024 10:35  Patient On (Oxygen Delivery Method): room air HPI:  63M PMHx HFrEF s/p ICD, HTN, T2DM, s/p LLE amputation   Pt presented w/ ICD shock 30min prior to arrival to ED. Pt reports he's been having progressively worsening GREWAL, orthopnea and SOB for the past few days.     In the ED, BP on the softer side, otherwise VSS. OK on RA.   CBC w/ wbc 7.2, hgb 9.3, plt 147.   CMP w/ SCr 1.66, LFT unremarkable. Trop 77, 74. proBNP 3255.   CXR w/ RLL opacity, cannot r/o infection.     ICD interrogated by EP in the ED, ICD oversensing and shock was inappropriate.   Pt also found w/ new RBBB.   ED rec c/w beta blockers.   Pt received full dose asa and bumex in the ED for fluid overload.   Admit for ADHF.  (25 Aug 2024 00:08)    Notified by RN pt was hypotensive this morning BP 74/43. RRT subsequently called. Pt received albumin 5% 250 cc x 1 to assist in improving BP. BP remained in 70s-80s systolic, however pt remained asymptomatic. Heart failure team assessed at bedside, stating pt is likely overdiuresed and bumex gtt stopped. Per HF and rapid team, will give small IVF boluses 250 cc slowly as needed if BP continues to drop. Discussed w/ medicine attending Dr. Lowery. Spouse Emma notified and updated of events. Will continue to monitor.     RRT called again this afternoon for hypotension SBP 50s. Pt received NS  cc bolus x 1 and BP improved systolic 80s. Will give another 250 cc bolus as needed for hypotension. Discussed w/ medicine attending Dr. Lowery.      Vital Signs Last 24 Hrs  T(C): 36.5 (04 Sep 2024 10:35), Max: 36.9 (03 Sep 2024 20:34)  T(F): 97.7 (04 Sep 2024 10:35), Max: 98.5 (03 Sep 2024 20:34)  HR: 90 (04 Sep 2024 10:35) (76 - 105)  BP: 74/43 (04 Sep 2024 10:35) (74/43 - 106/56)  BP(mean): 75 (03 Sep 2024 14:00) (65 - 75)  RR: 18 (04 Sep 2024 10:35) (15 - 18)  SpO2: 92% (04 Sep 2024 10:35) (91% - 100%)    Parameters below as of 04 Sep 2024 10:35  Patient On (Oxygen Delivery Method): room air HPI:  63M PMHx HFrEF s/p ICD, HTN, T2DM, s/p LLE amputation   Pt presented w/ ICD shock 30min prior to arrival to ED. Pt reports he's been having progressively worsening GREWAL, orthopnea and SOB for the past few days.     In the ED, BP on the softer side, otherwise VSS. OK on RA.   CBC w/ wbc 7.2, hgb 9.3, plt 147.   CMP w/ SCr 1.66, LFT unremarkable. Trop 77, 74. proBNP 3255.   CXR w/ RLL opacity, cannot r/o infection.     ICD interrogated by EP in the ED, ICD oversensing and shock was inappropriate.   Pt also found w/ new RBBB.   ED rec c/w beta blockers.   Pt received full dose asa and bumex in the ED for fluid overload.   Admit for ADHF.  (25 Aug 2024 00:08)    Notified by RN pt was hypotensive this morning BP 74/43. RRT subsequently called. Pt received albumin 5% 250 cc x 1 to assist in improving BP. BP remained in 70s-80s systolic, however pt remained asymptomatic. Heart failure team assessed at bedside, stating pt is likely overdiuresed and bumex gtt stopped. Per HF and rapid team, will give small IVF boluses 250 cc slowly as needed if BP continues to drop. Discussed w/ medicine attending Dr. Lowery. Spouse Emma notified and updated of events. Will continue to monitor.     RRT called again this afternoon for hypotension SBP 50s. Pt received NS  cc bolus x 1 and BP improved systolic 80s. Will give another 250 cc bolus as needed for hypotension. Discussed w/ medicine attending Dr. Lowery.  IV solumedrol 125 mg x 1 ordered per Dr. Sow to assist in hypotension, along with rash post-procedure.     Vital Signs Last 24 Hrs  T(C): 36.5 (04 Sep 2024 10:35), Max: 36.9 (03 Sep 2024 20:34)  T(F): 97.7 (04 Sep 2024 10:35), Max: 98.5 (03 Sep 2024 20:34)  HR: 90 (04 Sep 2024 10:35) (76 - 105)  BP: 74/43 (04 Sep 2024 10:35) (74/43 - 106/56)  BP(mean): 75 (03 Sep 2024 14:00) (65 - 75)  RR: 18 (04 Sep 2024 10:35) (15 - 18)  SpO2: 92% (04 Sep 2024 10:35) (91% - 100%)    Parameters below as of 04 Sep 2024 10:35  Patient On (Oxygen Delivery Method): room air

## 2024-09-04 NOTE — PROGRESS NOTE ADULT - ASSESSMENT
63 year old male with hx of ischemic cardiomyopathy with HFrEF 30%, cardiac arrest with prior transvenous ICD extraction for infection and subsequent [employer] S-ICD implant 2020, CKD 3, HTN, T2 DM, COPD, LUIS ALFREDO, and LLE amputee who presented after ICD shock. S-ICD Interrogation showed an inappropriate ICD shock for device oversensing (see procedure note for full interrogation). Patient denies prior ICD shocks in either devices that he has had.        1. Inappropriate S-ICD shock for device oversensing  2. Acute decompensated heart failure   3. right lower extremity Cellulitis, wound    - Previous S-ICD was deactivated for inappropriate device oversensing leading to inappropriate shock likely in setting of new RBBB on EKG which was not present in 2020 EKG  - Currently being treated for HF exacerbation with IV Bumex, spoke with HF team and he is optimized for tomorrow  - Patient s/p S-ICD extraction  with EV-ICD implant yesterday 9/3 ([employer])  - F/U CXR PA/Lat today  - Post op ICD instructions reviewed with patient, ID and booklet given  - ICD interrogated by device rep today, normal sensing/function  - F/U with EP CLinic on 9/11/24 at 1:40pm    #[phone] 63 year old male with hx of ischemic cardiomyopathy with HFrEF 30%, cardiac arrest with prior transvenous ICD extraction for infection and subsequent [employer] S-ICD implant 2020, CKD 3, HTN, T2 DM, COPD, LUIS ALFREDO, and LLE amputee who presented after ICD shock. S-ICD Interrogation showed an inappropriate ICD shock for device oversensing (see procedure note for full interrogation). Patient denies prior ICD shocks in either devices that he has had.        1. Inappropriate S-ICD shock for device oversensing  2. Acute decompensated heart failure   3. right lower extremity Cellulitis, wound    - Previous S-ICD was deactivated for inappropriate device oversensing leading to inappropriate shock likely in setting of new RBBB on EKG which was not present in 2020 EKG  - Currently being treated for HF exacerbation with IV Bumex, being optimized  - Patient s/p S-ICD extraction  with EV-ICD implant yesterday 9/3 ([employer])  - F/U CXR PA/Lat today  - Post op ICD instructions reviewed with patient, ID and booklet given  - ICD interrogated by device rep today, normal sensing/function  - F/U with EP CLinic on 9/11/24 at 1:40pm    #[phone]    Addendum:    s/p RRT today for hypotension, patient asymptomatic, ?likely d/t overdiuresis  Obtain 2d echo r/o pericardial effusion  Continue telemetry monitoring    #[phone]

## 2024-09-04 NOTE — PROGRESS NOTE ADULT - SUBJECTIVE AND OBJECTIVE BOX
Patient is a 63y old  Male who presents with a chief complaint of AICD firing (01 Sep 2024 11:21)      SUBJECTIVE / OVERNIGHT EVENTS:    Events noted.  CONSTITUTIONAL: S/p RRT  RESPIRATORY: No cough, wheezing,  No shortness of breath  CARDIOVASCULAR: No chest pain, palpitations,   GASTROINTESTINAL: No abdominal or epigastric pain.   NEUROLOGICAL: No headache    MEDICATIONS  (STANDING):  ammonium lactate 12% Lotion 1 Application(s) Topical every 12 hours  aspirin enteric coated 81 milliGRAM(s) Oral daily  clopidogrel Tablet 75 milliGRAM(s) Oral daily  dextrose 5%. 1000 milliLiter(s) (50 mL/Hr) IV Continuous <Continuous>  dextrose 5%. 1000 milliLiter(s) (100 mL/Hr) IV Continuous <Continuous>  dextrose 50% Injectable 12.5 Gram(s) IV Push once  dextrose 50% Injectable 25 Gram(s) IV Push once  hydrocortisone 1% Cream 1 Application(s) Topical two times a day  insulin glargine Injectable (LANTUS) 30 Unit(s) SubCutaneous at bedtime  insulin lispro (ADMELOG) corrective regimen sliding scale   SubCutaneous at bedtime  insulin lispro (ADMELOG) corrective regimen sliding scale   SubCutaneous three times a day before meals  insulin lispro Injectable (ADMELOG) 10 Unit(s) SubCutaneous three times a day before meals  levoFLOXacin  Tablet 500 milliGRAM(s) Oral every 24 hours  pantoprazole    Tablet 40 milliGRAM(s) Oral before breakfast  rosuvastatin 20 milliGRAM(s) Oral at bedtime  sodium chloride 0.65% Nasal 2 Spray(s) Both Nostrils four times a day  tamsulosin 0.4 milliGRAM(s) Oral at bedtime    MEDICATIONS  (PRN):  acetaminophen     Tablet .. 650 milliGRAM(s) Oral every 6 hours PRN Temp greater or equal to 38C (100.4F), Mild Pain (1 - 3)  aluminum hydroxide/magnesium hydroxide/simethicone Suspension 30 milliLiter(s) Oral every 4 hours PRN Dyspepsia  dextrose Oral Gel 15 Gram(s) Oral once PRN Blood Glucose LESS THAN 70 milliGRAM(s)/deciliter  diphenhydrAMINE 25 milliGRAM(s) Oral every 4 hours PRN Rash and/or Itching  melatonin 3 milliGRAM(s) Oral at bedtime PRN Insomnia  oxyCODONE    IR 5 milliGRAM(s) Oral every 4 hours PRN Moderate Pain (4 - 6)  oxyCODONE    IR 10 milliGRAM(s) Oral every 6 hours PRN Severe Pain (7 - 10)        CAPILLARY BLOOD GLUCOSE      POCT Blood Glucose.: 215 mg/dL (04 Sep 2024 21:07)  POCT Blood Glucose.: 168 mg/dL (04 Sep 2024 17:18)  POCT Blood Glucose.: 216 mg/dL (04 Sep 2024 14:34)  POCT Blood Glucose.: 238 mg/dL (04 Sep 2024 11:55)  POCT Blood Glucose.: 242 mg/dL (04 Sep 2024 10:44)  POCT Blood Glucose.: 237 mg/dL (04 Sep 2024 07:34)    I&O's Summary    03 Sep 2024 07:01  -  04 Sep 2024 07:00  --------------------------------------------------------  IN: 200 mL / OUT: 1050 mL / NET: -850 mL    04 Sep 2024 07:01  -  04 Sep 2024 22:45  --------------------------------------------------------  IN: 950 mL / OUT: 1000 mL / NET: -50 mL        T(C): 36.9 (09-04-24 @ 20:05), Max: 37.1 (09-04-24 @ 16:40)  HR: 111 (09-04-24 @ 21:30) (90 - 111)  BP: 96/64 (09-04-24 @ 21:30) (50/42 - 96/64)  RR: 18 (09-04-24 @ 20:05) (18 - 18)  SpO2: 96% (09-04-24 @ 20:05) (91% - 97%)    PHYSICAL EXAM:    NECK: Supple, No JVD  CHEST/LUNG: Clear to auscultation bilaterally; No wheezing.  HEART: Regular rate and rhythm; No murmurs, rubs, or gallops  ABDOMEN: Soft, Nontender, Nondistended; Bowel sounds present  EXTREMITIES:   No edema  NEUROLOGY: AAO       LABS:                        10.4   6.47  )-----------( 221      ( 03 Sep 2024 07:31 )             37.3     09-04    137  |  95<L>  |  66<H>  ----------------------------<  237<H>  3.6   |  29  |  2.34<H>    Ca    8.7      04 Sep 2024 06:26  Mg     2.3     09-04            Urinalysis Basic - ( 04 Sep 2024 06:26 )    Color: x / Appearance: x / SG: x / pH: x  Gluc: 237 mg/dL / Ketone: x  / Bili: x / Urobili: x   Blood: x / Protein: x / Nitrite: x   Leuk Esterase: x / RBC: x / WBC x   Sq Epi: x / Non Sq Epi: x / Bacteria: x      CAPILLARY BLOOD GLUCOSE      POCT Blood Glucose.: 215 mg/dL (04 Sep 2024 21:07)  POCT Blood Glucose.: 168 mg/dL (04 Sep 2024 17:18)  POCT Blood Glucose.: 216 mg/dL (04 Sep 2024 14:34)  POCT Blood Glucose.: 238 mg/dL (04 Sep 2024 11:55)  POCT Blood Glucose.: 242 mg/dL (04 Sep 2024 10:44)  POCT Blood Glucose.: 237 mg/dL (04 Sep 2024 07:34)        RADIOLOGY & ADDITIONAL TESTS:    Imaging Personally Reviewed:    Consultant(s) Notes Reviewed:      Care Discussed with Consultants/Other Providers:    Maximus Lowery MD, CMD, FACP    [address] [place] [address]  Office Tel: [phone]  Cell: [phone]

## 2024-09-04 NOTE — PROGRESS NOTE ADULT - SUBJECTIVE AND OBJECTIVE BOX
24H hour events:     MEDICATIONS:  aspirin enteric coated 81 milliGRAM(s) Oral daily  clopidogrel Tablet 75 milliGRAM(s) Oral daily  hydrALAZINE 25 milliGRAM(s) Oral three times a day  metoprolol succinate ER 25 milliGRAM(s) Oral daily  sacubitril 24 mG/valsartan 26 mG 1 Tablet(s) Oral every 12 hours  spironolactone 25 milliGRAM(s) Oral daily    levoFLOXacin  Tablet 500 milliGRAM(s) Oral every 24 hours      acetaminophen     Tablet .. 650 milliGRAM(s) Oral every 6 hours PRN  diphenhydrAMINE 25 milliGRAM(s) Oral once  melatonin 3 milliGRAM(s) Oral at bedtime PRN  oxyCODONE    IR 5 milliGRAM(s) Oral every 4 hours PRN  oxyCODONE    IR 10 milliGRAM(s) Oral every 6 hours PRN    aluminum hydroxide/magnesium hydroxide/simethicone Suspension 30 milliLiter(s) Oral every 4 hours PRN  pantoprazole    Tablet 40 milliGRAM(s) Oral before breakfast    dextrose 50% Injectable 25 Gram(s) IV Push once  dextrose 50% Injectable 12.5 Gram(s) IV Push once  dextrose Oral Gel 15 Gram(s) Oral once PRN  insulin glargine Injectable (LANTUS) 30 Unit(s) SubCutaneous at bedtime  insulin lispro (ADMELOG) corrective regimen sliding scale   SubCutaneous three times a day before meals  insulin lispro (ADMELOG) corrective regimen sliding scale   SubCutaneous at bedtime  insulin lispro Injectable (ADMELOG) 10 Unit(s) SubCutaneous three times a day before meals  rosuvastatin 20 milliGRAM(s) Oral at bedtime    ammonium lactate 12% Lotion 1 Application(s) Topical every 12 hours  dextrose 5%. 1000 milliLiter(s) IV Continuous <Continuous>  dextrose 5%. 1000 milliLiter(s) IV Continuous <Continuous>  sodium chloride 0.65% Nasal 2 Spray(s) Both Nostrils four times a day  tamsulosin 0.4 milliGRAM(s) Oral at bedtime      REVIEW OF SYSTEMS:  Complete 12point ROS negative.    PHYSICAL EXAM:  T(C): 36.5 (09-04-24 @ 04:43), Max: 36.9 (09-03-24 @ 20:34)  HR: 101 (09-04-24 @ 07:38) (76 - 105)  BP: 88/55 (09-04-24 @ 07:38) (88/55 - 106/56)  RR: 18 (09-04-24 @ 07:38) (15 - 18)  SpO2: 94% (09-04-24 @ 07:38) (91% - 100%)  Wt(kg): --  I&O's Summary    03 Sep 2024 07:01  -  04 Sep 2024 07:00  --------------------------------------------------------  IN: 200 mL / OUT: 1050 mL / NET: -850 mL        Appearance: Normal	  HEENT:   Normal oral mucosa, PERRL, EOMI	  Lymphatic: No lymphadenopathy  Cardiovascular: Normal S1 S2, No JVD, No murmurs, No edema  Respiratory: Lungs clear to auscultation	  Psychiatry: A & O x 3, Mood & affect appropriate  Gastrointestinal:  Soft, Non-tender, + BS	  Skin: No rashes, No ecchymoses, No cyanosis	  Neurologic: Non-focal  Extremities: Normal range of motion, No clubbing, cyanosis or edema  Vascular: Peripheral pulses palpable 2+ bilaterally        LABS:	 	    CBC Full  -  ( 03 Sep 2024 07:31 )  WBC Count : 6.47 K/uL  Hemoglobin : 10.4 g/dL  Hematocrit : 37.3 %  Platelet Count - Automated : 221 K/uL  Mean Cell Volume : 70.8 fl  Mean Cell Hemoglobin : 19.7 pg  Mean Cell Hemoglobin Concentration : 27.9 gm/dL  Auto Neutrophil # : x  Auto Lymphocyte # : x  Auto Monocyte # : x  Auto Eosinophil # : x  Auto Basophil # : x  Auto Neutrophil % : x  Auto Lymphocyte % : x  Auto Monocyte % : x  Auto Eosinophil % : x  Auto Basophil % : x    09-04    137  |  95<L>  |  66<H>  ----------------------------<  237<H>  3.6   |  29  |  2.34<H>  09-03    139  |  95<L>  |  55<H>  ----------------------------<  223<H>  4.0   |  28  |  1.99<H>    Ca    8.7      04 Sep 2024 06:26  Ca    8.9      03 Sep 2024 20:32  Mg     2.3     09-04  Mg     2.3     09-03        proBNP:   Lipid Profile:   HgA1c:   TSH:       CARDIAC MARKERS:          TELEMETRY: 	    ECG:  	  RADIOLOGY:  OTHER: 	    PREVIOUS DIAGNOSTIC TESTING:    [ ] Echocardiogram:    [ ]  Catheterization:  [ ] Stress Test:  	  	  ASSESSMENT/PLAN: 	     24H hour events:   Seen sitting in chair, c/o of generalized itching and mild incisional discomfort    MEDICATIONS:  aspirin enteric coated 81 milliGRAM(s) Oral daily  clopidogrel Tablet 75 milliGRAM(s) Oral daily  hydrALAZINE 25 milliGRAM(s) Oral three times a day  metoprolol succinate ER 25 milliGRAM(s) Oral daily  sacubitril 24 mG/valsartan 26 mG 1 Tablet(s) Oral every 12 hours  spironolactone 25 milliGRAM(s) Oral daily  levoFLOXacin  Tablet 500 milliGRAM(s) Oral every 24 hours  acetaminophen     Tablet .. 650 milliGRAM(s) Oral every 6 hours PRN  diphenhydrAMINE 25 milliGRAM(s) Oral once  melatonin 3 milliGRAM(s) Oral at bedtime PRN  oxyCODONE    IR 5 milliGRAM(s) Oral every 4 hours PRN  oxyCODONE    IR 10 milliGRAM(s) Oral every 6 hours PRN  aluminum hydroxide/magnesium hydroxide/simethicone Suspension 30 milliLiter(s) Oral every 4 hours PRN  pantoprazole    Tablet 40 milliGRAM(s) Oral before breakfast  dextrose 50% Injectable 25 Gram(s) IV Push once  dextrose 50% Injectable 12.5 Gram(s) IV Push once  dextrose Oral Gel 15 Gram(s) Oral once PRN  insulin glargine Injectable (LANTUS) 30 Unit(s) SubCutaneous at bedtime  insulin lispro (ADMELOG) corrective regimen sliding scale   SubCutaneous three times a day before meals  insulin lispro (ADMELOG) corrective regimen sliding scale   SubCutaneous at bedtime  insulin lispro Injectable (ADMELOG) 10 Unit(s) SubCutaneous three times a day before meals  rosuvastatin 20 milliGRAM(s) Oral at bedtime  ammonium lactate 12% Lotion 1 Application(s) Topical every 12 hours  dextrose 5%. 1000 milliLiter(s) IV Continuous <Continuous>  dextrose 5%. 1000 milliLiter(s) IV Continuous <Continuous>  sodium chloride 0.65% Nasal 2 Spray(s) Both Nostrils four times a day  tamsulosin 0.4 milliGRAM(s) Oral at bedtime      REVIEW OF SYSTEMS:  Complete 12point ROS negative except as noted above    PHYSICAL EXAM:  T(C): 36.5 (09-04-24 @ 04:43), Max: 36.9 (09-03-24 @ 20:34)  HR: 101 (09-04-24 @ 07:38) (76 - 105)  BP: 88/55 (09-04-24 @ 07:38) (88/55 - 106/56)  RR: 18 (09-04-24 @ 07:38) (15 - 18)  SpO2: 94% (09-04-24 @ 07:38) (91% - 100%)  Wt(kg): --  I&O's Summary    03 Sep 2024 07:01  -  04 Sep 2024 07:00  --------------------------------------------------------  IN: 200 mL / OUT: 1050 mL / NET: -850 mL        Appearance: Normal, in NAD  Head: normocephalic, atraumatic  Neck: supple  Cardiovascular: RRR S1 S2, No JVD, no m/r/g  Respiratory: Lungs clear to auscultation	  Psychiatry: A & O x 3, Mood & affect appropriate  Gastrointestinal:  Soft, Non-tender, + BS	  : voiding  Skin: No rashes, No ecchymoses, mid xyphoid and left lateral dressing dressing C/D/I  Neurologic: Non-focal  Extremities: Normal range of motion, No clubbing, cyanosis or edema  Vascular: Peripheral pulses palpable 2+ bilaterally        LABS:	 	    CBC Full  -  ( 03 Sep 2024 07:31 )  WBC Count : 6.47 K/uL  Hemoglobin : 10.4 g/dL  Hematocrit : 37.3 %  Platelet Count - Automated : 221 K/uL  Mean Cell Volume : 70.8 fl  Mean Cell Hemoglobin : 19.7 pg  Mean Cell Hemoglobin Concentration : 27.9 gm/dL  Auto Neutrophil # : x  Auto Lymphocyte # : x  Auto Monocyte # : x  Auto Eosinophil # : x  Auto Basophil # : x  Auto Neutrophil % : x  Auto Lymphocyte % : x  Auto Monocyte % : x  Auto Eosinophil % : x  Auto Basophil % : x    09-04    137  |  95<L>  |  66<H>  ----------------------------<  237<H>  3.6   |  29  |  2.34<H>  09-03    139  |  95<L>  |  55<H>  ----------------------------<  223<H>  4.0   |  28  |  1.99<H>    Ca    8.7      04 Sep 2024 06:26  Ca    8.9      03 Sep 2024 20:32  Mg     2.3     09-04  Mg     2.3     09-03        proBNP:   Lipid Profile:   HgA1c:   TSH:       CARDIAC MARKERS:      TELEMETRY: NSR, PVC, ventricular rates 90s   	    ECG:  NSR    CXR PA/Lat: pending today    	     24H hour events:   Seen sitting in chair, c/o of generalized itching and mild incisional discomfort; no sob, chest pain, dizziness    MEDICATIONS:  aspirin enteric coated 81 milliGRAM(s) Oral daily  clopidogrel Tablet 75 milliGRAM(s) Oral daily  hydrALAZINE 25 milliGRAM(s) Oral three times a day  metoprolol succinate ER 25 milliGRAM(s) Oral daily  sacubitril 24 mG/valsartan 26 mG 1 Tablet(s) Oral every 12 hours  spironolactone 25 milliGRAM(s) Oral daily  levoFLOXacin  Tablet 500 milliGRAM(s) Oral every 24 hours  acetaminophen     Tablet .. 650 milliGRAM(s) Oral every 6 hours PRN  diphenhydrAMINE 25 milliGRAM(s) Oral once  melatonin 3 milliGRAM(s) Oral at bedtime PRN  oxyCODONE    IR 5 milliGRAM(s) Oral every 4 hours PRN  oxyCODONE    IR 10 milliGRAM(s) Oral every 6 hours PRN  aluminum hydroxide/magnesium hydroxide/simethicone Suspension 30 milliLiter(s) Oral every 4 hours PRN  pantoprazole    Tablet 40 milliGRAM(s) Oral before breakfast  dextrose 50% Injectable 25 Gram(s) IV Push once  dextrose 50% Injectable 12.5 Gram(s) IV Push once  dextrose Oral Gel 15 Gram(s) Oral once PRN  insulin glargine Injectable (LANTUS) 30 Unit(s) SubCutaneous at bedtime  insulin lispro (ADMELOG) corrective regimen sliding scale   SubCutaneous three times a day before meals  insulin lispro (ADMELOG) corrective regimen sliding scale   SubCutaneous at bedtime  insulin lispro Injectable (ADMELOG) 10 Unit(s) SubCutaneous three times a day before meals  rosuvastatin 20 milliGRAM(s) Oral at bedtime  ammonium lactate 12% Lotion 1 Application(s) Topical every 12 hours  dextrose 5%. 1000 milliLiter(s) IV Continuous <Continuous>  dextrose 5%. 1000 milliLiter(s) IV Continuous <Continuous>  sodium chloride 0.65% Nasal 2 Spray(s) Both Nostrils four times a day  tamsulosin 0.4 milliGRAM(s) Oral at bedtime      REVIEW OF SYSTEMS:  Complete 12point ROS negative except as noted above    PHYSICAL EXAM:  T(C): 36.5 (09-04-24 @ 04:43), Max: 36.9 (09-03-24 @ 20:34)  HR: 101 (09-04-24 @ 07:38) (76 - 105)  BP: 88/55 (09-04-24 @ 07:38) (88/55 - 106/56)  RR: 18 (09-04-24 @ 07:38) (15 - 18)  SpO2: 94% (09-04-24 @ 07:38) (91% - 100%)  Wt(kg): --  I&O's Summary    03 Sep 2024 07:01  -  04 Sep 2024 07:00  --------------------------------------------------------  IN: 200 mL / OUT: 1050 mL / NET: -850 mL        Appearance: Normal, in NAD  Head: normocephalic, atraumatic  Neck: supple  Cardiovascular: RRR S1 S2, No JVD, no m/r/g  Respiratory: Lungs clear to auscultation	  Psychiatry: A & O x 3, Mood & affect appropriate  Gastrointestinal:  Soft, Non-tender, + BS	  : voiding  Skin: No rashes, No ecchymoses, mid xyphoid and left lateral dressing dressing C/D/I  Neurologic: Non-focal  Extremities: Normal range of motion, No clubbing, cyanosis or edema  Vascular: Peripheral pulses palpable 2+ bilaterally        LABS:	 	    CBC Full  -  ( 03 Sep 2024 07:31 )  WBC Count : 6.47 K/uL  Hemoglobin : 10.4 g/dL  Hematocrit : 37.3 %  Platelet Count - Automated : 221 K/uL  Mean Cell Volume : 70.8 fl  Mean Cell Hemoglobin : 19.7 pg  Mean Cell Hemoglobin Concentration : 27.9 gm/dL  Auto Neutrophil # : x  Auto Lymphocyte # : x  Auto Monocyte # : x  Auto Eosinophil # : x  Auto Basophil # : x  Auto Neutrophil % : x  Auto Lymphocyte % : x  Auto Monocyte % : x  Auto Eosinophil % : x  Auto Basophil % : x    09-04    137  |  95<L>  |  66<H>  ----------------------------<  237<H>  3.6   |  29  |  2.34<H>  09-03    139  |  95<L>  |  55<H>  ----------------------------<  223<H>  4.0   |  28  |  1.99<H>    Ca    8.7      04 Sep 2024 06:26  Ca    8.9      03 Sep 2024 20:32  Mg     2.3     09-04  Mg     2.3     09-03        proBNP:   Lipid Profile:   HgA1c:   TSH:       CARDIAC MARKERS:      TELEMETRY: NSR, PVC, ventricular rates 90s   	    ECG:  NSR    CXR PA/Lat: pending today

## 2024-09-04 NOTE — PROVIDER CONTACT NOTE (CRITICAL VALUE NOTIFICATION) - BACKGROUND
Pt admitted for AICD misfiring, s/p AICD extraction and re-implanted. S/p RRT for hypotension today.

## 2024-09-04 NOTE — PROGRESS NOTE ADULT - ASSESSMENT
63 year old Male with PMHx HFrEF s/p ICD, HTN, T2DM, s/p LLE amputation in [place] ~2 months ago. Pt presented to ED for ICD shock.        1- CKDIII  2- CHF  3- DM2  4- anemia  5- cellulitis   6- rash       ckd with MANJIT   hypotension received ns bolus 250 and albumin infusion as well   to receive additional 250 cc NS bolus  rash suspected in setting of contrast suspected pt had similar reaction in past per pt wife   steroid/benadryl   hold diuretics today   strict I/O  d/w primary team   trend creatinine and electrolytes daily

## 2024-09-04 NOTE — PROGRESS NOTE ADULT - PROBLEM SELECTOR PLAN 3
Etiology: ischemic  BB: hold toprol 25mg qd for now given hypotension  ACE/ARB/ARNI: hold entresto 24-26mg for now given hypotension  Afterload: hold hydralazine to 25mg TID  for now given hypotension  MRA: hold spironolactone 25mg qd  for now given hypotension  SGLT2: hold for now, consider restarting upon dc  Device: s/p S-ICD explant and EV-ICD reimplant this admission  Diuresis: volume status and weight drastically improved since presentation. hold diuresis for now given hypotension and likely overdiuresis, and in the setting of procedure yesterday    S/p Iron sucrose x 5 days for iron deficiency in decompensated heart failure  CardioMEMS discussed with patient.  He is interested but would like to do this as an outpatient.

## 2024-09-04 NOTE — PROGRESS NOTE ADULT - SUBJECTIVE AND OBJECTIVE BOX
[facility]   [phone]  RENAL FOLLOW UP NOTE  --------------------------------------------------------------------------------  Chief Complaint:    24 hour events/subjective:    seen earlier   above events noted  hypotension     PAST HISTORY  --------------------------------------------------------------------------------  No significant changes to PMH, PSH, FHx, SHx, unless otherwise noted    ALLERGIES & MEDICATIONS  --------------------------------------------------------------------------------  Allergies    ceftriaxone (Rash)  doxepin (Other)  Zosyn (Pruritus)  vancomycin (Rash (Mild to Mod))    Intolerances      Standing Inpatient Medications  ammonium lactate 12% Lotion 1 Application(s) Topical every 12 hours  aspirin enteric coated 81 milliGRAM(s) Oral daily  clopidogrel Tablet 75 milliGRAM(s) Oral daily  dextrose 5%. 1000 milliLiter(s) IV Continuous <Continuous>  dextrose 5%. 1000 milliLiter(s) IV Continuous <Continuous>  dextrose 50% Injectable 12.5 Gram(s) IV Push once  dextrose 50% Injectable 25 Gram(s) IV Push once  hydrocortisone 1% Cream 1 Application(s) Topical two times a day  insulin glargine Injectable (LANTUS) 30 Unit(s) SubCutaneous at bedtime  insulin lispro (ADMELOG) corrective regimen sliding scale   SubCutaneous three times a day before meals  insulin lispro (ADMELOG) corrective regimen sliding scale   SubCutaneous at bedtime  insulin lispro Injectable (ADMELOG) 10 Unit(s) SubCutaneous three times a day before meals  levoFLOXacin  Tablet 500 milliGRAM(s) Oral every 24 hours  pantoprazole    Tablet 40 milliGRAM(s) Oral before breakfast  rosuvastatin 20 milliGRAM(s) Oral at bedtime  sodium chloride 0.65% Nasal 2 Spray(s) Both Nostrils four times a day  tamsulosin 0.4 milliGRAM(s) Oral at bedtime    PRN Inpatient Medications  acetaminophen     Tablet .. 650 milliGRAM(s) Oral every 6 hours PRN  aluminum hydroxide/magnesium hydroxide/simethicone Suspension 30 milliLiter(s) Oral every 4 hours PRN  dextrose Oral Gel 15 Gram(s) Oral once PRN  diphenhydrAMINE 25 milliGRAM(s) Oral every 4 hours PRN  melatonin 3 milliGRAM(s) Oral at bedtime PRN  oxyCODONE    IR 5 milliGRAM(s) Oral every 4 hours PRN  oxyCODONE    IR 10 milliGRAM(s) Oral every 6 hours PRN      REVIEW OF SYSTEMS  --------------------------------------------------------------------------------    Gen: denies  fevers/chills,  CVS: denies chest pain/palpitations  Resp: denies SOB/Cough  GI: Denies N/V/Abd pain  : Denies dysuria or decrease urination     VITALS/PHYSICAL EXAM  --------------------------------------------------------------------------------  T(C): 36.9 (09-04-24 @ 20:05), Max: 37.1 (09-04-24 @ 16:40)  HR: 111 (09-04-24 @ 21:30) (90 - 111)  BP: 96/64 (09-04-24 @ 21:30) (50/42 - 96/64)  RR: 18 (09-04-24 @ 20:05) (18 - 18)  SpO2: 96% (09-04-24 @ 20:05) (91% - 97%)  Wt(kg): --  Height (cm): 195.6 (09-03-24 @ 07:44)  Weight (kg): 119.7 (09-03-24 @ 07:44)  BMI (kg/m2): 31.3 (09-03-24 @ 07:44)  BSA (m2): 2.52 (09-03-24 @ 07:44)      09-03-24 @ 07:01  -  09-04-24 @ 07:00  --------------------------------------------------------  IN: 200 mL / OUT: 1050 mL / NET: -850 mL    09-04-24 @ 07:01  -  09-04-24 @ 22:01  --------------------------------------------------------  IN: 950 mL / OUT: 1000 mL / NET: -50 mL      Physical Exam:  	    	Gen: Non toxic comfortable appearing   	Pulm: decrease bs  no rales or ronchi or wheezing  	CV: no JVD, RRR, S1S2; no rub  	Back: No CVA tenderness; no sacral edema  	Abd: +BS, soft, nontender/+distended, +abd wall edema, improved  	UE: Warm, no cyanosis  no clubbing,  no edema;  	LE: Warm, no cyanosis  no clubbing, RLE chronic venous stasis, edema decreased, LLE BKA   	Neuro: alert and oriented. speech coherent     LABS/STUDIES  --------------------------------------------------------------------------------              10.4   6.47  >-----------<  221      [09-03-24 @ 07:31]              37.3     137  |  95  |  66  ----------------------------<  237      [09-04-24 @ 06:26]  3.6   |  29  |  2.34        Ca     8.7     [09-04-24 @ 06:26]      Mg     2.3     [09-04-24 @ 06:26]            Creatinine Trend:  SCr 2.34 [09-04 @ 06:26]  SCr 1.99 [09-03 @ 20:32]  SCr 1.83 [09-03 @ 07:31]  SCr 1.91 [09-02 @ 21:15]  SCr 1.70 [09-02 @ 12:32]      Iron 24, TIBC 395, %sat 6      [08-29-24 @ 06:04]  Ferritin 51      [08-29-24 @ 07:31]  HbA1c 8.9      [12-16-19 @ 08:15]    Free Light Chains: kappa 9.40, lambda 8.09, ratio = 1.16      [08-30 @ 06:49]  Immunofixation Serum:   Weak IgG Lambda Band Identified      Reference Range: None Detected      [08-30-24 @ 06:49]  SPEP Interpretation: Weak Gamma-Migrating Paraprotein Identified      [08-30-24 @ 06:49]

## 2024-09-04 NOTE — PROGRESS NOTE ADULT - SUBJECTIVE AND OBJECTIVE BOX
DATE OF SERVICE: 09-04-24 @ 17:51    Patient is a 63y old  Male who presents with a chief complaint of AICD firing (01 Sep 2024 11:21)      INTERVAL HISTORY: Feels ok. RRT x2 today for hypotension.     REVIEW OF SYSTEMS:  CONSTITUTIONAL: No weakness  EYES/ENT: No visual changes;  No throat pain   NECK: No pain or stiffness  RESPIRATORY: No cough, wheezing; No shortness of breath  CARDIOVASCULAR: No chest pain or palpitations  GASTROINTESTINAL: No abdominal  pain. No nausea, vomiting, or hematemesis  GENITOURINARY: No dysuria, frequency or hematuria  NEUROLOGICAL: No stroke like symptoms  SKIN: No rashes    TELEMETRY Personally reviewed:  SR/ST with frequent PVCs, Bigeminy  	  MEDICATIONS:        PHYSICAL EXAM:  T(C): 37.1 (09-04-24 @ 16:40), Max: 37.1 (09-04-24 @ 16:40)  HR: 97 (09-04-24 @ 16:40) (90 - 101)  BP: 89/59 (09-04-24 @ 16:40) (50/42 - 92/57)  RR: 18 (09-04-24 @ 16:40) (18 - 18)  SpO2: 97% (09-04-24 @ 16:40) (91% - 97%)  Wt(kg): --  I&O's Summary    03 Sep 2024 07:01  -  04 Sep 2024 07:00  --------------------------------------------------------  IN: 200 mL / OUT: 1050 mL / NET: -850 mL    04 Sep 2024 07:01  -  04 Sep 2024 17:51  --------------------------------------------------------  IN: 200 mL / OUT: 0 mL / NET: 200 mL          Appearance: In no distress	  HEENT:    PERRL, EOMI	  Cardiovascular:  S1 S2, No JVD  Respiratory: Lungs clear to auscultation	  Gastrointestinal:  Soft, Non-tender, + BS	  Vascularature:  No edema of LE  Psychiatric: Appropriate affect   Neuro: no acute focal deficits                               10.4   6.47  )-----------( 221      ( 03 Sep 2024 07:31 )             37.3     09-04    137  |  95<L>  |  66<H>  ----------------------------<  237<H>  3.6   |  29  |  2.34<H>    Ca    8.7      04 Sep 2024 06:26  Mg     2.3     09-04          Labs personally reviewed      ASSESSMENT/PLAN: 	    Problem/Plan - 1:  ·  Problem: Acute decompensated systolic heart failure.   ·  Plan:    - TTE 8/26 similar to prior wiith EF 20%  - GDMT Toprol 25mg daily  - Cont Entresto 24/26mg BID as BP tolerates  - Cont Aldactone 25mg  - Hydralazine 10mg PO TID initiated as per HF  - Farxiga/Jardiance 10mg daily following procedures/surgeries  - Appreciate HF recommendations; Possible CardioMems this admission ? pt prefers to do as outpt  - As per wife, dry weight ~247lbs  - 9/4  Cr stable/BUN uptrending a/w RRT for hypotension. All HF meds and diuretics on hold. S/p IVF bolus and Albumin with improvement in BP and lactate.     Problem/Plan - 2:  ·  Problem: CAD (coronary artery disease).   ·  Plan: c/w asa and Plavix    Problem/Plan - 3:  ·  Problem: Chronic kidney disease, unspecified CKD stage.   ·  Plan: Monitor BMP daily, dose meds per renal fx, avoid nephrotoxins.    Problem/Plan - 4:  ·  Problem: ICD shock   ·  Plan: Inappropriate as per EP  - settings adjusted  - s/p ICD extraction and re-implant, (9/3)  - Current ICD disabled--> pacing pads on patient    Problem/Plan - 5:  ·  Problem: Prophylactic measure.   ·  Plan: VTE ppx: hep sq           Gertrude Villalobos, AG-NP   Shawn Barnes, DO [facility]  Cardiovascular Medicine  [address], [address]  Available through call or text on [employer]  Office: [phone]

## 2024-09-04 NOTE — PROGRESS NOTE ADULT - SUBJECTIVE AND OBJECTIVE BOX
Heart Failure Progress Note    S/p S-ICD explant, EV-ICD implant with EP yesterday  RRT this AM for asymptomatic hypotension- likely due to overdiuresis, GDMT regimen  Patient denies chest pain, SOB, lightheadedness, dizziness, palpitations  Feels at his baseline    Current Meds:  acetaminophen     Tablet .. 650 milliGRAM(s) Oral every 6 hours PRN  ALPRAZolam 0.25 milliGRAM(s) Oral once  aluminum hydroxide/magnesium hydroxide/simethicone Suspension 30 milliLiter(s) Oral every 4 hours PRN  ammonium lactate 12% Lotion 1 Application(s) Topical every 12 hours  aspirin enteric coated 81 milliGRAM(s) Oral daily  buMETAnide IVPB 4 milliGRAM(s) IV Intermittent <User Schedule>  buMETAnide IVPB 12 milliGRAM(s) IV Intermittent <User Schedule>  clopidogrel Tablet 75 milliGRAM(s) Oral daily  dextrose 5%. 1000 milliLiter(s) IV Continuous <Continuous>  dextrose 5%. 1000 milliLiter(s) IV Continuous <Continuous>  dextrose 50% Injectable 25 Gram(s) IV Push once  dextrose 50% Injectable 25 Gram(s) IV Push once  dextrose 50% Injectable 12.5 Gram(s) IV Push once  dextrose Oral Gel 15 Gram(s) Oral once PRN  glucagon  Injectable 1 milliGRAM(s) IntraMuscular once  hydrALAZINE 25 milliGRAM(s) Oral three times a day  insulin glargine Injectable (LANTUS) 30 Unit(s) SubCutaneous at bedtime  insulin lispro (ADMELOG) corrective regimen sliding scale   SubCutaneous three times a day before meals  insulin lispro (ADMELOG) corrective regimen sliding scale   SubCutaneous at bedtime  insulin lispro Injectable (ADMELOG) 10 Unit(s) SubCutaneous three times a day before meals  levoFLOXacin  Tablet 500 milliGRAM(s) Oral every 24 hours  melatonin 3 milliGRAM(s) Oral at bedtime PRN  metoprolol succinate ER 25 milliGRAM(s) Oral daily  ondansetron Injectable 4 milliGRAM(s) IV Push every 8 hours PRN  oxyCODONE    IR 5 milliGRAM(s) Oral daily PRN  pantoprazole    Tablet 40 milliGRAM(s) Oral before breakfast  rosuvastatin 20 milliGRAM(s) Oral at bedtime  sacubitril 24 mG/valsartan 26 mG 1 Tablet(s) Oral every 12 hours  sodium chloride 0.65% Nasal 2 Spray(s) Both Nostrils four times a day  spironolactone 25 milliGRAM(s) Oral daily  tamsulosin 0.4 milliGRAM(s) Oral at bedtime      Vitals:  T(F): 97.5 (09-02), Max: 98.2 (09-02)  HR: 66 (09-02) (66 - 103)  BP: 91/56 (09-02) (91/56 - 101/64)  RR: 18 (09-02)  SpO2: 96% (09-02)  I&O's Summary    01 Sep 2024 07:01  -  02 Sep 2024 07:00  --------------------------------------------------------  IN: 810 mL / OUT: 3530 mL / NET: -2720 mL    GENERAL: NAD  HEAD: Atraumatic, Normocephalic.  EYES: EOMI, PERRLA, conjunctiva and sclera clear.  ENT: Moist mucous membranes.  NECK: Supple, + JVD to mid neck  CHEST/LUNG: Diminished breath sounds at bases bilaterally  HEART: Regular rate and rhythm; No murmurs, rubs, or gallops.  ABDOMEN: Bowel sounds present; Mild abdominal distension  EXTREMITIES:  +LLE amputation. Chronic venous stasis changes of RLE. +pitting edema to upper thigh  PSYCH: Normal affect.  SKIN: No rashes or lesions.      09-01    139  |  99  |  48<H>  ----------------------------<  204<H>  3.8   |  28  |  1.81<H>    Ca    9.1      01 Sep 2024 18:14  Mg     2.3     09-01

## 2024-09-05 LAB
ANION GAP SERPL CALC-SCNC: 18 MMOL/L — HIGH (ref 5–17)
BUN SERPL-MCNC: 75 MG/DL — HIGH (ref 7–23)
CALCIUM SERPL-MCNC: 9.1 MG/DL — SIGNIFICANT CHANGE UP (ref 8.4–10.5)
CHLORIDE SERPL-SCNC: 93 MMOL/L — LOW (ref 96–108)
CO2 SERPL-SCNC: 23 MMOL/L — SIGNIFICANT CHANGE UP (ref 22–31)
CREAT SERPL-MCNC: 2.63 MG/DL — HIGH (ref 0.5–1.3)
EGFR: 27 ML/MIN/1.73M2 — LOW
GLUCOSE BLDC GLUCOMTR-MCNC: 130 MG/DL — HIGH (ref 70–99)
GLUCOSE BLDC GLUCOMTR-MCNC: 136 MG/DL — HIGH (ref 70–99)
GLUCOSE BLDC GLUCOMTR-MCNC: 254 MG/DL — HIGH (ref 70–99)
GLUCOSE BLDC GLUCOMTR-MCNC: 315 MG/DL — HIGH (ref 70–99)
GLUCOSE SERPL-MCNC: 321 MG/DL — HIGH (ref 70–99)
LACTATE BLDV-MCNC: 2.1 MMOL/L — HIGH (ref 0.5–2)
MAGNESIUM SERPL-MCNC: 2.2 MG/DL — SIGNIFICANT CHANGE UP (ref 1.6–2.6)
POTASSIUM SERPL-MCNC: 4.1 MMOL/L — SIGNIFICANT CHANGE UP (ref 3.5–5.3)
POTASSIUM SERPL-SCNC: 4.1 MMOL/L — SIGNIFICANT CHANGE UP (ref 3.5–5.3)
SODIUM SERPL-SCNC: 134 MMOL/L — LOW (ref 135–145)

## 2024-09-05 PROCEDURE — 71046 X-RAY EXAM CHEST 2 VIEWS: CPT | Mod: 26

## 2024-09-05 PROCEDURE — 99233 SBSQ HOSP IP/OBS HIGH 50: CPT | Mod: 25

## 2024-09-05 RX ADMIN — Medication 1 APPLICATION(S): at 05:32

## 2024-09-05 RX ADMIN — OXYCODONE HYDROCHLORIDE 10 MILLIGRAM(S): 5 TABLET ORAL at 23:06

## 2024-09-05 RX ADMIN — Medication 2 SPRAY(S): at 11:25

## 2024-09-05 RX ADMIN — TAMSULOSIN HYDROCHLORIDE 0.4 MILLIGRAM(S): 0.4 CAPSULE ORAL at 21:10

## 2024-09-05 RX ADMIN — Medication 2 SPRAY(S): at 05:32

## 2024-09-05 RX ADMIN — INSULIN GLARGINE 30 UNIT(S): 100 INJECTION, SOLUTION SUBCUTANEOUS at 21:09

## 2024-09-05 RX ADMIN — Medication 75 MILLIGRAM(S): at 11:24

## 2024-09-05 RX ADMIN — Medication 2 SPRAY(S): at 17:44

## 2024-09-05 RX ADMIN — Medication 10 UNIT(S): at 08:29

## 2024-09-05 RX ADMIN — Medication 81 MILLIGRAM(S): at 11:24

## 2024-09-05 RX ADMIN — Medication 40 MILLIGRAM(S): at 05:30

## 2024-09-05 RX ADMIN — Medication 1 APPLICATION(S): at 17:44

## 2024-09-05 RX ADMIN — ROSUVASTATIN CALCIUM 20 MILLIGRAM(S): 10 TABLET ORAL at 23:06

## 2024-09-05 RX ADMIN — Medication 4: at 08:28

## 2024-09-05 RX ADMIN — Medication 10 UNIT(S): at 17:43

## 2024-09-05 RX ADMIN — Medication 25 MILLIGRAM(S): at 04:33

## 2024-09-05 NOTE — CONSULT NOTE ADULT - SUBJECTIVE AND OBJECTIVE BOX
Interventional Radiology    Evaluate for Procedure: Pericardiocentesis     HPI: 63 year old Male with PMHx HFrEF s/p ICD, HTN, T2DM, s/p LLE amputation in [place] ~2 months ago. Pt presented to ED for ICD shock 24 and SOB. Hospital course notable for S-ICD extraction with EV-ICD implant 9/3 ([employer]) and hypotensive event . TTE  demonstrates trace pericardial effusion. IR now consulted for pericardiocentesis.     Allergies: ceftriaxone (Rash)  doxepin (Other)  Zosyn (Pruritus)  vancomycin (Rash (Mild to Mod))    Medications (Abx/Cardiac/Anticoagulation/Blood Products)  aspirin enteric coated: 81 milliGRAM(s) Oral ( @ 12:28)  buMETAnide IVPB: 132 mL/Hr IV Intermittent ( @ 08:02)  buMETAnide IVPB: 5 mL/Hr IV Intermittent ( @ 15:47)  clopidogrel Tablet: 75 milliGRAM(s) Oral ( @ 12:28)  hydrALAZINE: 25 milliGRAM(s) Oral ( @ 21:39)  levoFLOXacin  Tablet: 500 milliGRAM(s) Oral ( @ 05:29)  metoprolol succinate ER: 25 milliGRAM(s) Oral ( @ 06:28)  sacubitril 24 mG/valsartan 26 m Tablet(s) Oral ( @ 19:10)    Data:  T(C): 36.8  HR: 104  BP: 94/56  RR: 18  SpO2: 90%    -WBC 6.47 / HgB 10.4 / Hct 37.3 / Plt 221  -Na 134 / Cl 93 / BUN 75 / Glucose 321  -K 4.1 / CO2 23 / Cr 2.63  -ALT -- / Alk Phos -- / T.Bili --  -INR 1.39 / PTT 35.1    Radiology:   < from: TTE Limited W or WO Ultrasound Enhancing Agent (24 @ 17:26) >     CONCLUSIONS:      1. Left ventricular systolic function is severely decreased.   2. Trace pericardial effusion, trace pericardial effusion noted adjacent to the posterior left ventricle and trace pericardial effusion noted adjacent to the rightatrium with no evidence of hemodynamic compromise (or echocardiographic evidence of cardiac tamponade): no diastolic collapse of right atrium, exceding 1/3 of the cardiac cycle, no early diastolic inversion of the right ventricle and respiratory variation across the tricuspid valve E wave is not greater than 60%.   3. Compared to the transthoracic echocardiogram performed on 2024, there have been no significant interval changes.    ________________________________________________________________________________________  FINDINGS:     Left Ventricle:  Left ventricular systolic function is severely decreased.     Pericardium:  There is a trace pericardial effusion, a trace pericardial effusion noted adjacent to the posterior left ventricleand a trace pericardial effusion noted adjacent to the right atrium with no evidence of hemodynamic compromise (or echocardiographic evidence of cardiac tamponade). This was supported by evidence of, the right atrium does not display diastolic collapse, exceding 1/3 of the cardiac cycle, no early diastolic inversion of the right ventricle and respiratory variation across the tricuspid valve E wave is not greater than 60%.  ________________________________________    < end of copied text >    Assessment/Plan:   63 year old Male with PMHx HFrEF s/p ICD, HTN, T2DM, s/p LLE amputation in [place] ~2 months ago. Pt presented to ED for ICD shock 24 and SOB. Hospital course notable for S-ICD extraction with EV-ICD implant 9/3 ([employer]) and hypotensive event . TTE  demonstrates trace pericardial effusion. IR now consulted for pericardiocentesis.     *INCOMPLETE NOTE*  - case reviewed and approved for ____  - please place IR procedure order under ______  - STAT labs in AM (cbc,coags, bmp, T&S)  - hold AC x___hrs  - NPO on ____ at 11pm  - d/w primary team Interventional Radiology    Evaluate for Procedure: Pericardiocentesis     HPI: 63 year old Male with PMHx HFrEF s/p ICD, HTN, T2DM, s/p LLE amputation in [place] ~2 months ago. Pt presented to ED for ICD shock 24 and SOB. Hospital course notable for S-ICD extraction with EV-ICD implant 9/3 ([employer]) and hypotensive event . TTE  demonstrates trace pericardial effusion. IR now consulted for pericardiocentesis.     Allergies:  ceftriaxone (Rash)  doxepin (Other)  Zosyn (Pruritus)  vancomycin (Rash (Mild to Mod))    Medications (Abx/Cardiac/Anticoagulation/Blood Products)  aspirin enteric coated: 81 milliGRAM(s) Oral ( @ 12:28)  buMETAnide IVPB: 132 mL/Hr IV Intermittent ( @ 08:02)  buMETAnide IVPB: 5 mL/Hr IV Intermittent ( @ 15:47)  clopidogrel Tablet: 75 milliGRAM(s) Oral ( @ 12:28)  hydrALAZINE: 25 milliGRAM(s) Oral ( @ 21:39)  levoFLOXacin  Tablet: 500 milliGRAM(s) Oral ( @ 05:29)  metoprolol succinate ER: 25 milliGRAM(s) Oral ( @ 06:28)  sacubitril 24 mG/valsartan 26 m Tablet(s) Oral ( @ 19:10)    Data:  T(C): 36.8  HR: 104  BP: 94/56  RR: 18  SpO2: 90%    -WBC 6.47 / HgB 10.4 / Hct 37.3 / Plt 221  -Na 134 / Cl 93 / BUN 75 / Glucose 321  -K 4.1 / CO2 23 / Cr 2.63  -ALT -- / Alk Phos -- / T.Bili --  -INR 1.39 / PTT 35.1    Radiology:   < from: TTE Limited W or WO Ultrasound Enhancing Agent (24 @ 17:26) >     CONCLUSIONS:      1. Left ventricular systolic function is severely decreased.   2. Trace pericardial effusion, trace pericardial effusion noted adjacent to the posterior left ventricle and trace pericardial effusion noted adjacent to the rightatrium with no evidence of hemodynamic compromise (or echocardiographic evidence of cardiac tamponade): no diastolic collapse of right atrium, exceding 1/3 of the cardiac cycle, no early diastolic inversion of the right ventricle and respiratory variation across the tricuspid valve E wave is not greater than 60%.   3. Compared to the transthoracic echocardiogram performed on 2024, there have been no significant interval changes.    ________________________________________________________________________________________  FINDINGS:     Left Ventricle:  Left ventricular systolic function is severely decreased.     Pericardium:  There is a trace pericardial effusion, a trace pericardial effusion noted adjacent to the posterior left ventricleand a trace pericardial effusion noted adjacent to the right atrium with no evidence of hemodynamic compromise (or echocardiographic evidence of cardiac tamponade). This was supported by evidence of, the right atrium does not display diastolic collapse, exceeding 1/3 of the cardiac cycle, no early diastolic inversion of the right ventricle and respiratory variation across the tricuspid valve E wave is not greater than 60%.  ________________________________________    < end of copied text >    Assessment/Plan:   63 year old Male with PMHx HFrEF s/p ICD, HTN, T2DM, s/p LLE amputation in [place] ~2 months ago. Pt presented to ED for ICD shock 24 and SOB. Hospital course notable for S-ICD extraction with EV-ICD implant 9/3 ([employer]) and hypotensive event . TTE  demonstrates trace pericardial effusion. IR now consulted for pericardiocentesis.     - trace pericardial effusion; too small for drain placement  - consider interventional cardiology consult if pericardiocentesis if necessary  - no indication for pericardial drain at this time  - IR to sign off; Please reconsult as necessary  - d/w primary team

## 2024-09-05 NOTE — PROGRESS NOTE ADULT - SUBJECTIVE AND OBJECTIVE BOX
Patient is a 63y old  Male who presents with a chief complaint of AICD firing (01 Sep 2024 11:21)      SUBJECTIVE / OVERNIGHT EVENTS:    Events noted.  CONSTITUTIONAL: S/p RRT  RESPIRATORY: No cough, wheezing,  No shortness of breath  CARDIOVASCULAR: No chest pain, palpitations,   GASTROINTESTINAL: No abdominal or epigastric pain.   NEUROLOGICAL: No headache    MEDICATIONS  (STANDING):  ammonium lactate 12% Lotion 1 Application(s) Topical every 12 hours  aspirin enteric coated 81 milliGRAM(s) Oral daily  clopidogrel Tablet 75 milliGRAM(s) Oral daily  dextrose 5%. 1000 milliLiter(s) (50 mL/Hr) IV Continuous <Continuous>  dextrose 5%. 1000 milliLiter(s) (100 mL/Hr) IV Continuous <Continuous>  dextrose 50% Injectable 12.5 Gram(s) IV Push once  dextrose 50% Injectable 25 Gram(s) IV Push once  hydrocortisone 1% Cream 1 Application(s) Topical two times a day  insulin glargine Injectable (LANTUS) 30 Unit(s) SubCutaneous at bedtime  insulin lispro (ADMELOG) corrective regimen sliding scale   SubCutaneous at bedtime  insulin lispro (ADMELOG) corrective regimen sliding scale   SubCutaneous three times a day before meals  insulin lispro Injectable (ADMELOG) 10 Unit(s) SubCutaneous three times a day before meals  levoFLOXacin  Tablet 500 milliGRAM(s) Oral every 24 hours  pantoprazole    Tablet 40 milliGRAM(s) Oral before breakfast  rosuvastatin 20 milliGRAM(s) Oral at bedtime  sodium chloride 0.65% Nasal 2 Spray(s) Both Nostrils four times a day  tamsulosin 0.4 milliGRAM(s) Oral at bedtime    MEDICATIONS  (PRN):  acetaminophen     Tablet .. 650 milliGRAM(s) Oral every 6 hours PRN Temp greater or equal to 38C (100.4F), Mild Pain (1 - 3)  aluminum hydroxide/magnesium hydroxide/simethicone Suspension 30 milliLiter(s) Oral every 4 hours PRN Dyspepsia  dextrose Oral Gel 15 Gram(s) Oral once PRN Blood Glucose LESS THAN 70 milliGRAM(s)/deciliter  diphenhydrAMINE 25 milliGRAM(s) Oral every 4 hours PRN Rash and/or Itching  melatonin 3 milliGRAM(s) Oral at bedtime PRN Insomnia  oxyCODONE    IR 5 milliGRAM(s) Oral every 4 hours PRN Moderate Pain (4 - 6)  oxyCODONE    IR 10 milliGRAM(s) Oral every 6 hours PRN Severe Pain (7 - 10)        CAPILLARY BLOOD GLUCOSE      POCT Blood Glucose.: 215 mg/dL (04 Sep 2024 21:07)  POCT Blood Glucose.: 168 mg/dL (04 Sep 2024 17:18)  POCT Blood Glucose.: 216 mg/dL (04 Sep 2024 14:34)  POCT Blood Glucose.: 238 mg/dL (04 Sep 2024 11:55)  POCT Blood Glucose.: 242 mg/dL (04 Sep 2024 10:44)  POCT Blood Glucose.: 237 mg/dL (04 Sep 2024 07:34)    I&O's Summary    03 Sep 2024 07:01  -  04 Sep 2024 07:00  --------------------------------------------------------  IN: 200 mL / OUT: 1050 mL / NET: -850 mL    04 Sep 2024 07:01  -  04 Sep 2024 22:45  --------------------------------------------------------  IN: 950 mL / OUT: 1000 mL / NET: -50 mL        T(C): 36.9 (09-04-24 @ 20:05), Max: 37.1 (09-04-24 @ 16:40)  HR: 111 (09-04-24 @ 21:30) (90 - 111)  BP: 96/64 (09-04-24 @ 21:30) (50/42 - 96/64)  RR: 18 (09-04-24 @ 20:05) (18 - 18)  SpO2: 96% (09-04-24 @ 20:05) (91% - 97%)    PHYSICAL EXAM:    NECK: Supple, No JVD  CHEST/LUNG: Clear to auscultation bilaterally; No wheezing.  HEART: Regular rate and rhythm; No murmurs, rubs, or gallops  ABDOMEN: Soft, Nontender, Nondistended; Bowel sounds present  EXTREMITIES:   No edema  NEUROLOGY: AAO       LABS:                        10.4   6.47  )-----------( 221      ( 03 Sep 2024 07:31 )             37.3     09-04    137  |  95<L>  |  66<H>  ----------------------------<  237<H>  3.6   |  29  |  2.34<H>    Ca    8.7      04 Sep 2024 06:26  Mg     2.3     09-04            Urinalysis Basic - ( 04 Sep 2024 06:26 )    Color: x / Appearance: x / SG: x / pH: x  Gluc: 237 mg/dL / Ketone: x  / Bili: x / Urobili: x   Blood: x / Protein: x / Nitrite: x   Leuk Esterase: x / RBC: x / WBC x   Sq Epi: x / Non Sq Epi: x / Bacteria: x      CAPILLARY BLOOD GLUCOSE      POCT Blood Glucose.: 215 mg/dL (04 Sep 2024 21:07)  POCT Blood Glucose.: 168 mg/dL (04 Sep 2024 17:18)  POCT Blood Glucose.: 216 mg/dL (04 Sep 2024 14:34)  POCT Blood Glucose.: 238 mg/dL (04 Sep 2024 11:55)  POCT Blood Glucose.: 242 mg/dL (04 Sep 2024 10:44)  POCT Blood Glucose.: 237 mg/dL (04 Sep 2024 07:34)        RADIOLOGY & ADDITIONAL TESTS:    Imaging Personally Reviewed:    Consultant(s) Notes Reviewed:      Care Discussed with Consultants/Other Providers:    Maximus Lowery MD, CMD, FACP    [address] [place] [address]  Office Tel: [phone]  Cell: [phone]

## 2024-09-05 NOTE — CONSULT NOTE ADULT - PROVIDER SPECIALTY LIST ADULT
ENT
Heme/Onc
Wound Care
Intervent Radiology
Podiatry
Electrophysiology
Nephrology
Cardiology
Heart Failure

## 2024-09-05 NOTE — PROGRESS NOTE ADULT - ASSESSMENT
63 year old Male with PMHx HFrEF s/p ICD, HTN, T2DM, s/p LLE amputation in [place] ~2 months ago. Pt presented to ED for ICD shock.        1- CKDIII  2- CHF  3- DM2  4- anemia  5- cellulitis   6- rash       ckd with MANJIT   creatinine rising in setting of overdiuresis, and hypotension  rash suspected in setting of contrast suspected pt had similar reaction in past per pt wife   steroid/benadryl   hold diuretics today and hold bp meds today  strict I/O  trend creatinine and electrolytes in am

## 2024-09-05 NOTE — PROGRESS NOTE ADULT - ASSESSMENT
63 year old male with hx of ischemic cardiomyopathy with HFrEF 30%, cardiac arrest with prior transvenous ICD extraction for infection and subsequent [employer] S-ICD implant 2020, CKD 3, HTN, T2 DM, COPD, LUIS ALFREDO, and LLE amputee who presented after ICD shock. S-ICD Interrogation showed an inappropriate ICD shock for device oversensing (see procedure note for full interrogation). Now s/p S-ICD extraction with [employer] EV-ICD implant on 9/3. s/p RRT 9/4/24 for hypotension, patient asymptomatic, ?likely d/t overdiuresis. Echo showed trace pericardial effusion.     1. Inappropriate S-ICD shock for device oversensing  2. Acute decompensated heart failure   3. right lower extremity Cellulitis, wound    - CXR revealed good device placement   - Post op ICD instructions reviewed with patient, ID and booklet given on 9/4  - ICD interrogated by device rep 9/4, normal sensing/function  - F/U with [facility] on 9/11/24 at 1:40pm  - EP will sign off, reconsult as needed.   - Discussed with Medicine ACP.     DANIEL Hernandes NP-C  [phone]

## 2024-09-05 NOTE — PROGRESS NOTE ADULT - SUBJECTIVE AND OBJECTIVE BOX
24H hour events: Pt without complaint, no acute events overnight. Tele: SR/ST at 's    MEDICATIONS:  aspirin enteric coated 81 milliGRAM(s) Oral daily  clopidogrel Tablet 75 milliGRAM(s) Oral daily  levoFLOXacin  Tablet 500 milliGRAM(s) Oral every 24 hours  acetaminophen     Tablet .. 650 milliGRAM(s) Oral every 6 hours PRN  diphenhydrAMINE 25 milliGRAM(s) Oral every 4 hours PRN  melatonin 3 milliGRAM(s) Oral at bedtime PRN  oxyCODONE    IR 5 milliGRAM(s) Oral every 4 hours PRN  oxyCODONE    IR 10 milliGRAM(s) Oral every 6 hours PRN  aluminum hydroxide/magnesium hydroxide/simethicone Suspension 30 milliLiter(s) Oral every 4 hours PRN  pantoprazole    Tablet 40 milliGRAM(s) Oral before breakfast  dextrose 50% Injectable 12.5 Gram(s) IV Push once  dextrose 50% Injectable 25 Gram(s) IV Push once  dextrose Oral Gel 15 Gram(s) Oral once PRN  insulin glargine Injectable (LANTUS) 30 Unit(s) SubCutaneous at bedtime  insulin lispro (ADMELOG) corrective regimen sliding scale   SubCutaneous at bedtime  insulin lispro (ADMELOG) corrective regimen sliding scale   SubCutaneous three times a day before meals  insulin lispro Injectable (ADMELOG) 10 Unit(s) SubCutaneous three times a day before meals  rosuvastatin 20 milliGRAM(s) Oral at bedtime  ammonium lactate 12% Lotion 1 Application(s) Topical every 12 hours  dextrose 5%. 1000 milliLiter(s) IV Continuous <Continuous>  dextrose 5%. 1000 milliLiter(s) IV Continuous <Continuous>  hydrocortisone 1% Cream 1 Application(s) Topical two times a day  sodium chloride 0.65% Nasal 2 Spray(s) Both Nostrils four times a day  tamsulosin 0.4 milliGRAM(s) Oral at bedtime      REVIEW OF SYSTEMS:  Complete 12 point ROS negative.    PHYSICAL EXAM:  T(C): 36.8 (09-05-24 @ 04:00), Max: 37.4 (09-05-24 @ 00:03)  HR: 104 (09-05-24 @ 04:00) (95 - 111)  BP: 94/56 (09-05-24 @ 04:00) (50/42 - 105/60)  RR: 18 (09-05-24 @ 04:00) (18 - 18)  SpO2: 90% (09-05-24 @ 04:00) (90% - 97%)  Wt(kg): --  I&O's Summary    04 Sep 2024 07:01  -  05 Sep 2024 07:00  --------------------------------------------------------  IN: 950 mL / OUT: 2250 mL / NET: -1300 mL        Appearance: NAD, OOB sitting up in chair 	  HEENT: PERRL, EOMI	  Cardiovascular: Normal S1 S2, No JVD, No murmurs  Respiratory: Lungs clear to auscultation	  Psychiatry: A & O x 3, Mood & affect appropriate  Gastrointestinal: Soft, Non-tender, + BS	  Skin: EVICD site C/D/I	  Neurologic: Grossly intact  Extremities: No clubbing, cyanosis or edema. Left AKA  Vascular: Peripheral pulses palpable 2+ bilaterally        LABS:	 	      09-05    134<L>  |  93<L>  |  75<H>  ----------------------------<  321<H>  4.1   |  23  |  2.63<H>  09-04    137  |  95<L>  |  66<H>  ----------------------------<  237<H>  3.6   |  29  |  2.34<H>    Ca    9.1      05 Sep 2024 05:49  Ca    8.7      04 Sep 2024 06:26  Mg     2.2     09-05  Mg     2.3     09-04        TELEMETRY: SR/ST at 's    < from: TTE Limited W or WO Ultrasound Enhancing Agent (09.04.24 @ 17:26) >  CONCLUSIONS:      1. Left ventricular systolic function is severely decreased.   2. Trace pericardial effusion, trace pericardial effusion noted adjacent to the posterior left ventricle and trace pericardial effusion noted adjacent to the rightatrium with no evidence of hemodynamic compromise (or echocardiographic evidence of cardiac tamponade): no diastolic collapse of right atrium, exceding 1/3 of the cardiac cycle, no early diastolic inversion of the right ventricle and respiratory variation across the tricuspid valve E wave is not greater than 60%.   3. Compared to the transthoracic echocardiogram performed on 8/27/2024, there have been no significant interval changes.    ________________________________________________________________________________________  FINDINGS:     Left Ventricle:  Left ventricular systolic function is severely decreased.     Pericardium:  There is a trace pericardial effusion, a trace pericardial effusion noted adjacent to the posterior left ventricleand a trace pericardial effusion noted adjacent to the right atrium with no evidence of hemodynamic compromise (or echocardiographic evidence of cardiac tamponade). This was supported by evidence of, the right atrium does not display diastolic collapse, exceding 1/3 of the cardiac cycle, no early diastolic inversion of the right ventricle and respiratory variation across the tricuspid valve E wave is not greater than 60%.  ________________________________________________________________________________________  Electronically signed on 9/4/2024 at 6:09:45 PM by Juni Cespedes M.D.    < end of copied text >

## 2024-09-05 NOTE — CONSULT NOTE ADULT - CONSULT REQUESTED DATE/TIME
24-Aug-2024 20:41
26-Aug-2024 12:08
26-Aug-2024
27-Aug-2024 14:07
05-Sep-2024 07:11
25-Aug-2024
26-Aug-2024 17:48
29-Aug-2024 09:36
28-Aug-2024

## 2024-09-05 NOTE — PROGRESS NOTE ADULT - SUBJECTIVE AND OBJECTIVE BOX
DATE OF SERVICE: 09-05-24 @ 16:04    Patient is a 63y old  Male who presents with a chief complaint of AICD firing (01 Sep 2024 11:21)      INTERVAL HISTORY: Feels ok.     REVIEW OF SYSTEMS:  CONSTITUTIONAL: No weakness  EYES/ENT: No visual changes;  No throat pain   NECK: No pain or stiffness  RESPIRATORY: No cough, wheezing; No shortness of breath  CARDIOVASCULAR: No chest pain or palpitations  GASTROINTESTINAL: No abdominal  pain. No nausea, vomiting, or hematemesis  GENITOURINARY: No dysuria, frequency or hematuria  NEUROLOGICAL: No stroke like symptoms  SKIN: No rashes    TELEMETRY Personally reviewed: SR   	  MEDICATIONS:        PHYSICAL EXAM:  T(C): 36.8 (09-05-24 @ 11:30), Max: 37.4 (09-05-24 @ 00:03)  HR: 111 (09-05-24 @ 11:30) (97 - 111)  BP: 100/69 (09-05-24 @ 11:30) (87/54 - 105/60)  RR: 18 (09-05-24 @ 11:30) (18 - 18)  SpO2: 94% (09-05-24 @ 11:30) (90% - 97%)  Wt(kg): --  I&O's Summary    04 Sep 2024 07:01  -  05 Sep 2024 07:00  --------------------------------------------------------  IN: 950 mL / OUT: 2250 mL / NET: -1300 mL    05 Sep 2024 07:01  -  05 Sep 2024 16:04  --------------------------------------------------------  IN: 400 mL / OUT: 400 mL / NET: 0 mL          Appearance: In no distress	  HEENT:    PERRL, EOMI	  Cardiovascular:  S1 S2, No JVD  Respiratory: Lungs clear to auscultation	  Gastrointestinal:  Soft, Non-tender, + BS	  Vascularature:  L BKA  Psychiatric: Appropriate affect   Neuro: no acute focal deficits           09-05    134<L>  |  93<L>  |  75<H>  ----------------------------<  321<H>  4.1   |  23  |  2.63<H>    Ca    9.1      05 Sep 2024 05:49  Mg     2.2     09-05          Labs personally reviewed      ASSESSMENT/PLAN: 	      Problem/Plan - 1:  ·  Problem: Acute decompensated systolic heart failure.   ·  Plan:    - TTE 8/26 similar to prior wiith EF 20%  - GDMT Toprol 25mg daily  - Cont Entresto 24/26mg BID as BP tolerates  - Cont Aldactone 25mg  - Hydralazine 10mg PO TID initiated as per HF  - Farxiga/Jardiance 10mg daily following procedures/surgeries  - Appreciate HF recommendations; Possible CardioMems this admission ? pt prefers to do as outpt  - As per wife, dry weight ~247lbs  - 9/4  Cr stable/BUN uptrending a/w RRT for hypotension. All HF meds and diuretics on hold. S/p IVF bolus and Albumin with improvement in BP and lactate.  - 9/5 now with improvement in BP. Plan to slowly resume HF meds and monitor BP and BUN/Cr.  - TTE with trace pericardial effusion with no evidence of tamponade. No further interval changes.      Problem/Plan - 2:  ·  Problem: CAD (coronary artery disease).   ·  Plan: c/w asa and Plavix    Problem/Plan - 3:  ·  Problem: Chronic kidney disease, unspecified CKD stage.   ·  Plan: Monitor BMP daily, dose meds per renal fx, avoid nephrotoxins.    Problem/Plan - 4:  ·  Problem: ICD shock   ·  Plan: Inappropriate as per EP  - settings adjusted  - s/p ICD extraction and re-implant, (9/3)  - Current ICD disabled--> pacing pads on patient    Problem/Plan - 5:  ·  Problem: Prophylactic measure.   ·  Plan: VTE ppx: hep sq         Gertrude Villalobos, AG-NP   Shawn Barnes, DO [facility]  Cardiovascular Medicine  [address], [address]  Available through call or text on [employer]  Office: [phone]

## 2024-09-05 NOTE — PROGRESS NOTE ADULT - SUBJECTIVE AND OBJECTIVE BOX
[facility]   [phone]  RENAL FOLLOW UP NOTE  --------------------------------------------------------------------------------  Chief Complaint:    24 hour events/subjective:    patient seen and examined.   denies sob    PAST HISTORY  --------------------------------------------------------------------------------  No significant changes to PMH, PSH, FHx, SHx, unless otherwise noted    ALLERGIES & MEDICATIONS  --------------------------------------------------------------------------------  Allergies    ceftriaxone (Rash)  doxepin (Other)  Zosyn (Pruritus)  vancomycin (Rash (Mild to Mod))    Intolerances      Standing Inpatient Medications  ammonium lactate 12% Lotion 1 Application(s) Topical every 12 hours  aspirin enteric coated 81 milliGRAM(s) Oral daily  clopidogrel Tablet 75 milliGRAM(s) Oral daily  dextrose 5%. 1000 milliLiter(s) IV Continuous <Continuous>  dextrose 5%. 1000 milliLiter(s) IV Continuous <Continuous>  dextrose 50% Injectable 12.5 Gram(s) IV Push once  dextrose 50% Injectable 25 Gram(s) IV Push once  hydrocortisone 1% Cream 1 Application(s) Topical two times a day  insulin glargine Injectable (LANTUS) 30 Unit(s) SubCutaneous at bedtime  insulin lispro (ADMELOG) corrective regimen sliding scale   SubCutaneous at bedtime  insulin lispro (ADMELOG) corrective regimen sliding scale   SubCutaneous three times a day before meals  insulin lispro Injectable (ADMELOG) 10 Unit(s) SubCutaneous three times a day before meals  levoFLOXacin  Tablet 500 milliGRAM(s) Oral every 24 hours  pantoprazole    Tablet 40 milliGRAM(s) Oral before breakfast  rosuvastatin 20 milliGRAM(s) Oral at bedtime  sodium chloride 0.65% Nasal 2 Spray(s) Both Nostrils four times a day  tamsulosin 0.4 milliGRAM(s) Oral at bedtime    PRN Inpatient Medications  acetaminophen     Tablet .. 650 milliGRAM(s) Oral every 6 hours PRN  aluminum hydroxide/magnesium hydroxide/simethicone Suspension 30 milliLiter(s) Oral every 4 hours PRN  dextrose Oral Gel 15 Gram(s) Oral once PRN  diphenhydrAMINE 25 milliGRAM(s) Oral every 4 hours PRN  melatonin 3 milliGRAM(s) Oral at bedtime PRN  oxyCODONE    IR 5 milliGRAM(s) Oral every 4 hours PRN  oxyCODONE    IR 10 milliGRAM(s) Oral every 6 hours PRN      REVIEW OF SYSTEMS  --------------------------------------------------------------------------------    Gen: denies fevers/chills,  CVS: denies chest pain/palpitations  Resp: denies SOB/Cough  GI: Denies N/V/Abd pain  : Denies dysuria/oliguria/hematuria    VITALS/PHYSICAL EXAM  --------------------------------------------------------------------------------  T(C): 36.8 (09-05-24 @ 11:30), Max: 37.4 (09-05-24 @ 00:03)  HR: 111 (09-05-24 @ 11:30) (95 - 111)  BP: 100/69 (09-05-24 @ 11:30) (50/42 - 105/60)  RR: 18 (09-05-24 @ 11:30) (18 - 18)  SpO2: 94% (09-05-24 @ 11:30) (90% - 97%)  Wt(kg): --        09-04-24 @ 07:01  -  09-05-24 @ 07:00  --------------------------------------------------------  IN: 950 mL / OUT: 2250 mL / NET: -1300 mL      Physical Exam:  	  	Gen: Non toxic comfortable appearing   	Pulm: decrease bs  no rales or ronchi or wheezing  	CV: no JVD, RRR, S1S2; no rub  	Back: No CVA tenderness; no sacral edema  	Abd: +BS, soft, nontender/+distended, +abd wall edema, improved  	UE: Warm, no cyanosis  no clubbing,  no edema;  	LE: Warm, no cyanosis  no clubbing, RLE chronic venous stasis, edema decreased, LLE BKA   	Neuro: alert and oriented. speech coherent     LABS/STUDIES  --------------------------------------------------------------------------------    134  |  93  |  75  ----------------------------<  321      [09-05-24 @ 05:49]  4.1   |  23  |  2.63        Ca     9.1     [09-05-24 @ 05:49]      Mg     2.2     [09-05-24 @ 05:49]            Creatinine Trend:  SCr 2.63 [09-05 @ 05:49]  SCr 2.34 [09-04 @ 06:26]  SCr 1.99 [09-03 @ 20:32]  SCr 1.83 [09-03 @ 07:31]  SCr 1.91 [09-02 @ 21:15]                Iron 24, TIBC 395, %sat 6      [08-29-24 @ 06:04]  Ferritin 51      [08-29-24 @ 07:31]  HbA1c 8.9      [12-16-19 @ 08:15]    Free Light Chains: kappa 9.40, lambda 8.09, ratio = 1.16      [08-30 @ 06:49]  Immunofixation Serum:   Weak IgG Lambda Band Identified      Reference Range: None Detected      [08-30-24 @ 06:49]  SPEP Interpretation: Weak Gamma-Migrating Paraprotein Identified      [08-30-24 @ 06:49]

## 2024-09-06 LAB
ANION GAP SERPL CALC-SCNC: 14 MMOL/L — SIGNIFICANT CHANGE UP (ref 5–17)
BUN SERPL-MCNC: 72 MG/DL — HIGH (ref 7–23)
CALCIUM SERPL-MCNC: 9.1 MG/DL — SIGNIFICANT CHANGE UP (ref 8.4–10.5)
CHLORIDE SERPL-SCNC: 97 MMOL/L — SIGNIFICANT CHANGE UP (ref 96–108)
CO2 SERPL-SCNC: 25 MMOL/L — SIGNIFICANT CHANGE UP (ref 22–31)
CREAT SERPL-MCNC: 2.07 MG/DL — HIGH (ref 0.5–1.3)
EGFR: 36 ML/MIN/1.73M2 — LOW
GLUCOSE BLDC GLUCOMTR-MCNC: 124 MG/DL — HIGH (ref 70–99)
GLUCOSE BLDC GLUCOMTR-MCNC: 132 MG/DL — HIGH (ref 70–99)
GLUCOSE BLDC GLUCOMTR-MCNC: 139 MG/DL — HIGH (ref 70–99)
GLUCOSE BLDC GLUCOMTR-MCNC: 154 MG/DL — HIGH (ref 70–99)
GLUCOSE SERPL-MCNC: 127 MG/DL — HIGH (ref 70–99)
HCT VFR BLD CALC: 35.4 % — LOW (ref 39–50)
HGB BLD-MCNC: 9.8 G/DL — LOW (ref 13–17)
MAGNESIUM SERPL-MCNC: 2.4 MG/DL — SIGNIFICANT CHANGE UP (ref 1.6–2.6)
MCHC RBC-ENTMCNC: 19.7 PG — LOW (ref 27–34)
MCHC RBC-ENTMCNC: 27.7 GM/DL — LOW (ref 32–36)
MCV RBC AUTO: 71.2 FL — LOW (ref 80–100)
NRBC # BLD: 0 /100 WBCS — SIGNIFICANT CHANGE UP (ref 0–0)
PHOSPHATE SERPL-MCNC: 3.5 MG/DL — SIGNIFICANT CHANGE UP (ref 2.5–4.5)
PLATELET # BLD AUTO: 193 K/UL — SIGNIFICANT CHANGE UP (ref 150–400)
POTASSIUM SERPL-MCNC: 3.7 MMOL/L — SIGNIFICANT CHANGE UP (ref 3.5–5.3)
POTASSIUM SERPL-SCNC: 3.7 MMOL/L — SIGNIFICANT CHANGE UP (ref 3.5–5.3)
RBC # BLD: 4.97 M/UL — SIGNIFICANT CHANGE UP (ref 4.2–5.8)
RBC # FLD: 21.4 % — HIGH (ref 10.3–14.5)
SODIUM SERPL-SCNC: 136 MMOL/L — SIGNIFICANT CHANGE UP (ref 135–145)
WBC # BLD: 6.88 K/UL — SIGNIFICANT CHANGE UP (ref 3.8–10.5)
WBC # FLD AUTO: 6.88 K/UL — SIGNIFICANT CHANGE UP (ref 3.8–10.5)

## 2024-09-06 PROCEDURE — 99232 SBSQ HOSP IP/OBS MODERATE 35: CPT

## 2024-09-06 PROCEDURE — 99233 SBSQ HOSP IP/OBS HIGH 50: CPT

## 2024-09-06 RX ORDER — BUMETANIDE 2 MG/1
2 TABLET ORAL
Refills: 0 | Status: DISCONTINUED | OUTPATIENT
Start: 2024-09-06 | End: 2024-09-09

## 2024-09-06 RX ORDER — DIPHENHYDRAMINE HCL 50 MG
25 CAPSULE ORAL ONCE
Refills: 0 | Status: COMPLETED | OUTPATIENT
Start: 2024-09-06 | End: 2024-09-06

## 2024-09-06 RX ORDER — BUMETANIDE 2 MG/1
4 TABLET ORAL DAILY
Refills: 0 | Status: DISCONTINUED | OUTPATIENT
Start: 2024-09-06 | End: 2024-09-06

## 2024-09-06 RX ADMIN — Medication 1: at 11:34

## 2024-09-06 RX ADMIN — Medication 1 APPLICATION(S): at 05:01

## 2024-09-06 RX ADMIN — Medication 81 MILLIGRAM(S): at 11:31

## 2024-09-06 RX ADMIN — OXYCODONE HYDROCHLORIDE 10 MILLIGRAM(S): 5 TABLET ORAL at 22:25

## 2024-09-06 RX ADMIN — INSULIN GLARGINE 30 UNIT(S): 100 INJECTION, SOLUTION SUBCUTANEOUS at 21:23

## 2024-09-06 RX ADMIN — OXYCODONE HYDROCHLORIDE 10 MILLIGRAM(S): 5 TABLET ORAL at 00:06

## 2024-09-06 RX ADMIN — Medication 1 APPLICATION(S): at 17:37

## 2024-09-06 RX ADMIN — BUMETANIDE 2 MILLIGRAM(S): 2 TABLET ORAL at 13:49

## 2024-09-06 RX ADMIN — Medication 75 MILLIGRAM(S): at 11:31

## 2024-09-06 RX ADMIN — TAMSULOSIN HYDROCHLORIDE 0.4 MILLIGRAM(S): 0.4 CAPSULE ORAL at 21:23

## 2024-09-06 RX ADMIN — Medication 10 UNIT(S): at 08:19

## 2024-09-06 RX ADMIN — Medication 10 UNIT(S): at 17:36

## 2024-09-06 RX ADMIN — ROSUVASTATIN CALCIUM 20 MILLIGRAM(S): 10 TABLET ORAL at 21:23

## 2024-09-06 RX ADMIN — Medication 10 UNIT(S): at 11:34

## 2024-09-06 RX ADMIN — Medication 2 SPRAY(S): at 11:31

## 2024-09-06 RX ADMIN — Medication 40 MILLIGRAM(S): at 05:00

## 2024-09-06 RX ADMIN — Medication 25 MILLIGRAM(S): at 12:30

## 2024-09-06 RX ADMIN — OXYCODONE HYDROCHLORIDE 10 MILLIGRAM(S): 5 TABLET ORAL at 21:25

## 2024-09-06 NOTE — PROGRESS NOTE ADULT - ASSESSMENT
63 year old Male with PMHx HFrEF s/p ICD, HTN, T2DM, s/p LLE amputation in [place] ~2 months ago. Pt presented to ED for ICD shock.        1- CKDIII  2- CHF  3- DM2  4- anemia  5- cellulitis   6- rash         servando in setting of overdiuresis leading to hypotension  creatinine returned to baseline  restart bumex 2mg po bid  slow initiation of bp meds per cards  no contraindication to resume entresto  rash suspected in setting of contrast suspected pt had similar reaction in past per pt wife   steroid/benadryl  strict I/O  trend creatinine and electrolytes in am

## 2024-09-06 NOTE — PROGRESS NOTE ADULT - SUBJECTIVE AND OBJECTIVE BOX
DATE OF SERVICE: 09-06-24 @ 11:16    Patient is a 63y old  Male who presents with a chief complaint of AICD firing (01 Sep 2024 11:21)      INTERVAL HISTORY: Feels good.     REVIEW OF SYSTEMS:  CONSTITUTIONAL: No weakness  EYES/ENT: No visual changes;  No throat pain   NECK: No pain or stiffness  RESPIRATORY: No cough, wheezing; No shortness of breath  CARDIOVASCULAR: No chest pain or palpitations  GASTROINTESTINAL: No abdominal  pain. No nausea, vomiting, or hematemesis  GENITOURINARY: No dysuria, frequency or hematuria  NEUROLOGICAL: No stroke like symptoms  SKIN: No rashes    TELEMETRY Personally reviewed: SR/ST 1st AVB   	  MEDICATIONS:  buMETAnide 4 milliGRAM(s) Oral daily        PHYSICAL EXAM:  T(C): 36.4 (09-06-24 @ 04:00), Max: 36.8 (09-05-24 @ 11:30)  HR: 105 (09-06-24 @ 09:49) (104 - 111)  BP: 102/69 (09-06-24 @ 09:49) (98/66 - 102/69)  RR: 18 (09-06-24 @ 04:00) (18 - 18)  SpO2: 96% (09-06-24 @ 09:49) (94% - 97%)  Wt(kg): --  I&O's Summary    05 Sep 2024 07:01  -  06 Sep 2024 07:00  --------------------------------------------------------  IN: 760 mL / OUT: 1950 mL / NET: -1190 mL    06 Sep 2024 07:01  -  06 Sep 2024 11:16  --------------------------------------------------------  IN: 300 mL / OUT: 700 mL / NET: -400 mL          Appearance: In no distress	  HEENT:    PERRL, EOMI	  Cardiovascular:  S1 S2, + JVD  Respiratory: Lungs clear to auscultation	  Gastrointestinal:  Soft, Non-tender, + BS	  Vascularature: L BKA  Psychiatric: Appropriate affect   Neuro: no acute focal deficits                               9.8    6.88  )-----------( 193      ( 06 Sep 2024 05:17 )             35.4     09-06    136  |  97  |  72<H>  ----------------------------<  127<H>  3.7   |  25  |  2.07<H>    Ca    9.1      06 Sep 2024 05:17  Phos  3.5     09-06  Mg     2.4     09-06          Labs personally reviewed      ASSESSMENT/PLAN: 	      Problem/Plan - 1:  ·  Problem: Acute decompensated systolic heart failure.   ·  Plan:    - TTE 8/26 similar to prior wiith EF 20%  - Will slowly resume sHF GDMT as BP tolerates  --- Toprol 25mg daily  --- Entresto 24/26mg BID  --- Aldactone 25mg  --- Jardiance 10mg daily  --- Home dose Bumex 4mg PO daily  - Appreciate HF recommendations; Possible CardioMems this admission ? pt prefers to do as outpt  - As per wife, dry weight ~247lbs  - 9/4  Cr stable/BUN uptrending a/w RRT for hypotension. All HF meds and diuretics on hold. S/p IVF bolus and Albumin with improvement in BP and lactate.  - 9/5 now with improvement in BP. Plan to slowly resume HF meds and monitor BP and BUN/Cr.  - TTE with trace pericardial effusion with no evidence of tamponade. No further interval changes.      Problem/Plan - 2:  ·  Problem: CAD (coronary artery disease).   ·  Plan: c/w asa and Plavix    Problem/Plan - 3:  ·  Problem: Chronic kidney disease, unspecified CKD stage.   ·  Plan: Monitor BMP daily, dose meds per renal fx, avoid nephrotoxins.    Problem/Plan - 4:  ·  Problem: ICD shock   ·  Plan: Inappropriate as per EP  - settings adjusted  - s/p ICD extraction and re-implant, (9/3)  - Current ICD disabled--> pacing pads on patient    Problem/Plan - 5:  ·  Problem: Prophylactic measure.   ·  Plan: VTE ppx: hep sq           Gertrude Villalobos, AG-NP   Shawn Barnes, DO [facility]  Cardiovascular Medicine  [address], [address]  Available through call or text on [employer] TE[employer]  Office: [phone]   DATE OF SERVICE: 09-06-24 @ 11:16    Patient is a 63y old  Male who presents with a chief complaint of AICD firing (01 Sep 2024 11:21)      INTERVAL HISTORY: Feels good.     REVIEW OF SYSTEMS:  CONSTITUTIONAL: No weakness  EYES/ENT: No visual changes;  No throat pain   NECK: No pain or stiffness  RESPIRATORY: No cough, wheezing; No shortness of breath  CARDIOVASCULAR: No chest pain or palpitations  GASTROINTESTINAL: No abdominal  pain. No nausea, vomiting, or hematemesis  GENITOURINARY: No dysuria, frequency or hematuria  NEUROLOGICAL: No stroke like symptoms  SKIN: No rashes    TELEMETRY Personally reviewed: SR/ST 1st AVB   	  MEDICATIONS:  buMETAnide 4 milliGRAM(s) Oral daily        PHYSICAL EXAM:  T(C): 36.4 (09-06-24 @ 04:00), Max: 36.8 (09-05-24 @ 11:30)  HR: 105 (09-06-24 @ 09:49) (104 - 111)  BP: 102/69 (09-06-24 @ 09:49) (98/66 - 102/69)  RR: 18 (09-06-24 @ 04:00) (18 - 18)  SpO2: 96% (09-06-24 @ 09:49) (94% - 97%)  Wt(kg): --  I&O's Summary    05 Sep 2024 07:01  -  06 Sep 2024 07:00  --------------------------------------------------------  IN: 760 mL / OUT: 1950 mL / NET: -1190 mL    06 Sep 2024 07:01  -  06 Sep 2024 11:16  --------------------------------------------------------  IN: 300 mL / OUT: 700 mL / NET: -400 mL          Appearance: In no distress	  HEENT:    PERRL, EOMI	  Cardiovascular:  S1 S2, + JVD  Respiratory: Lungs clear to auscultation	  Gastrointestinal:  Soft, Non-tender, + BS	  Vascularature: L BKA  Psychiatric: Appropriate affect   Neuro: no acute focal deficits                               9.8    6.88  )-----------( 193      ( 06 Sep 2024 05:17 )             35.4     09-06    136  |  97  |  72<H>  ----------------------------<  127<H>  3.7   |  25  |  2.07<H>    Ca    9.1      06 Sep 2024 05:17  Phos  3.5     09-06  Mg     2.4     09-06          Labs personally reviewed      ASSESSMENT/PLAN: 	      Problem/Plan - 1:  ·  Problem: Acute decompensated systolic heart failure.   ·  Plan:    - TTE 8/26 similar to prior wiith EF 20%  - Will slowly resume sHF GDMT as BP tolerates  --- Toprol 25mg daily  --- Entresto 24/26mg BID  --- Aldactone 25mg  --- Jardiance 10mg daily  --- Home dose Bumex 4mg PO daily  - Appreciate HF recommendations; Possible CardioMems this admission ? pt prefers to do as outpt  - As per wife, dry weight ~247lbs  - 9/4 Cr stable/BUN uptrending a/w RRT for hypotension. All HF meds and diuretics on hold. S/p IVF bolus and Albumin with improvement in BP and lactate.  - 9/5 with improvement in BP. Plan to slowly resume HF meds and monitor BP and BUN/Cr.  - TTE with trace pericardial effusion with no evidence of tamponade. No further interval changes.      Problem/Plan - 2:  ·  Problem: CAD (coronary artery disease).   ·  Plan: c/w asa and Plavix    Problem/Plan - 3:  ·  Problem: Chronic kidney disease, unspecified CKD stage.   ·  Plan: Monitor BMP daily, dose meds per renal fx, avoid nephrotoxins.    Problem/Plan - 4:  ·  Problem: ICD shock   ·  Plan: Inappropriate as per EP  - settings adjusted  - s/p ICD extraction and re-implant, (9/3)  - Current ICD disabled--> pacing pads on patient    Problem/Plan - 5:  ·  Problem: Prophylactic measure.   ·  Plan: VTE ppx: hep sq           Gertrude Villalobos, AG-NP   Shawn Barnes, DO [facility]  Cardiovascular Medicine  [address] [place] [address], [address]  Available through call or text on [employer] TEAMs  Office: [phone]

## 2024-09-06 NOTE — PROGRESS NOTE ADULT - ATTENDING COMMENTS
Mr. Jessica is a 64 y/o M /w h/o HFrEF/ICM (EF 20%, LVEDD 6.4 cm) s/p prior ICD c/b bacteremia s/p extraction (now with subQ ICD), IDDM (A1c 7.1) c/b osteo s/p LLE BKA (2024), CKD (b/l Cr 1.6), CAD s/p STEMI s/p PCI (2018 to RI) and repeat PCI to LCx (2019) who presented on 8/24 with ICD shock d/t oversensing. SubQ deactivated. Noted to be volume overloaded. Diuresing well. On exam, JVP severely elevated, RRR, dec BS b/l, distended/tense abdomen, b/l edema to sacrum. Labs reviewed - Hb 9, BUN/Cr 45/1.92 (b/l 1.6), BNP 3k, K 3.8. LHC 12/18/19 - ostial %, distal LCx 80% s/p PCI, mRCA 50%. TTE 8/25 reviewed - EF 20%, LVEDD 6.4 cm, mod TR, LVOT VTI 9.5 cm, E/e' 22, severe TR, dilated IVC. Overall stage C HF, NYHA class III with severe volume overload, responding to diuretics.  - c/w current regimen  - increase hydral as above  - c/w haylie 25 mg daily  - c/w toprol 25 mg daily  - iron studies c/w iron def; IV iron as above  - bumex gtt as above  - standing weights daily  - d/w patient disease process
S/p SC-ICD exchange  Diuresed extensively in prior days. Now has hypotension and MANJIT likely due to overdiuresis.  Hold Entresto, Albert and Toprol     Still hypovolemic today   Administer 250cc NS today     - Tomorrow resume Entresto low dose   - Later resume Toprol and Albert    Harman Mendoza MD
S/p SC-ICD exchange  Diuresed extensively in prior days. Now has hypotension and MANJIT likely due to overdiuresis.  Hold Entresto, Albert and Toprol tonight - resume tomorrow   Agree with 250 cc NS  Resume GDMT tomorrow    Harman Mendoza MD
Patient was seen and examined with the fellow.  I agree with the above except for the following:    JVP elevated to 12cm with + HJR.  Continue on bumex drip at current rate.  Lower extremity edema improving.  Renal function stable at 1.7.
Patient was seen and examined with the fellow.  I agree with the above except for the following:    Improving volume status, JVP now around 8-10cm sitting upright.  Would decrease bumex drip to 0.5mg/hr and stop tomorrow pre procedure.  He is optimized from a HF standpoint to move forward with ICD implant tomorrow.
Patient was seen and examined with the fellow.  I agree with the above except for the following:    Doing well on bumex drip.  JVP around 10-12cm with still trace edema in lower extremity (no edema around left BKA).  Anticipate that he will be able to come off of the drip in the next 24-48 hours.  Renal function stable.  Patient planned for ICD implant on Tuesday.  However, patient notes he does not want to stay in the hospital for caridoMEMS implant after that.  He prefers to do that electively as an outpatient.

## 2024-09-06 NOTE — PROGRESS NOTE ADULT - SUBJECTIVE AND OBJECTIVE BOX
[facility]   [phone]  RENAL FOLLOW UP NOTE  --------------------------------------------------------------------------------  Chief Complaint:    24 hour events/subjective:    patient seen and examined.   denies sob    PAST HISTORY  --------------------------------------------------------------------------------  No significant changes to PMH, PSH, FHx, SHx, unless otherwise noted    ALLERGIES & MEDICATIONS  --------------------------------------------------------------------------------  Allergies    ceftriaxone (Rash)  doxepin (Other)  Zosyn (Pruritus)  vancomycin (Rash (Mild to Mod))    Intolerances      Standing Inpatient Medications  ammonium lactate 12% Lotion 1 Application(s) Topical every 12 hours  aspirin enteric coated 81 milliGRAM(s) Oral daily  buMETAnide 2 milliGRAM(s) Oral two times a day  clopidogrel Tablet 75 milliGRAM(s) Oral daily  dextrose 5%. 1000 milliLiter(s) IV Continuous <Continuous>  dextrose 5%. 1000 milliLiter(s) IV Continuous <Continuous>  dextrose 50% Injectable 12.5 Gram(s) IV Push once  dextrose 50% Injectable 25 Gram(s) IV Push once  diphenhydrAMINE 25 milliGRAM(s) Oral once  hydrocortisone 1% Cream 1 Application(s) Topical two times a day  insulin glargine Injectable (LANTUS) 30 Unit(s) SubCutaneous at bedtime  insulin lispro (ADMELOG) corrective regimen sliding scale   SubCutaneous at bedtime  insulin lispro (ADMELOG) corrective regimen sliding scale   SubCutaneous three times a day before meals  insulin lispro Injectable (ADMELOG) 10 Unit(s) SubCutaneous three times a day before meals  levoFLOXacin  Tablet 500 milliGRAM(s) Oral every 24 hours  pantoprazole    Tablet 40 milliGRAM(s) Oral before breakfast  rosuvastatin 20 milliGRAM(s) Oral at bedtime  sodium chloride 0.65% Nasal 2 Spray(s) Both Nostrils four times a day  tamsulosin 0.4 milliGRAM(s) Oral at bedtime    PRN Inpatient Medications  acetaminophen     Tablet .. 650 milliGRAM(s) Oral every 6 hours PRN  aluminum hydroxide/magnesium hydroxide/simethicone Suspension 30 milliLiter(s) Oral every 4 hours PRN  dextrose Oral Gel 15 Gram(s) Oral once PRN  diphenhydrAMINE 25 milliGRAM(s) Oral every 4 hours PRN  melatonin 3 milliGRAM(s) Oral at bedtime PRN  oxyCODONE    IR 5 milliGRAM(s) Oral every 4 hours PRN  oxyCODONE    IR 10 milliGRAM(s) Oral every 6 hours PRN      REVIEW OF SYSTEMS  --------------------------------------------------------------------------------    Gen: denies fevers/chills,  CVS: denies chest pain/palpitations  Resp: denies SOB/Cough  GI: Denies N/V/Abd pain  : Denies dysuria    VITALS/PHYSICAL EXAM  --------------------------------------------------------------------------------  T(C): 36.6 (09-06-24 @ 11:18), Max: 36.6 (09-06-24 @ 11:18)  HR: 99 (09-06-24 @ 11:18) (99 - 108)  BP: 99/64 (09-06-24 @ 11:18) (98/66 - 102/69)  RR: 18 (09-06-24 @ 11:18) (18 - 18)  SpO2: 97% (09-06-24 @ 11:18) (96% - 97%)  Wt(kg): --        09-05-24 @ 07:01  -  09-06-24 @ 07:00  --------------------------------------------------------  IN: 760 mL / OUT: 1950 mL / NET: -1190 mL    09-06-24 @ 07:01  -  09-06-24 @ 12:16  --------------------------------------------------------  IN: 300 mL / OUT: 700 mL / NET: -400 mL      Physical Exam:  	  	Gen: Non toxic comfortable appearing   	Pulm: decrease bs  no rales or ronchi or wheezing  	CV: +JVD, RRR, S1S2; no rub  	Back: No CVA tenderness; no sacral edema  	Abd: +BS, soft, nontender/+distended, +abd wall edema, improved  	UE: Warm, no cyanosis  no clubbing,  no edema;  	LE: Warm, no cyanosis  no clubbing, RLE chronic venous stasis, edema decreased, LLE BKA   	Neuro: alert and oriented. speech coherent     LABS/STUDIES  --------------------------------------------------------------------------------              9.8    6.88  >-----------<  193      [09-06-24 @ 05:17]              35.4     136  |  97  |  72  ----------------------------<  127      [09-06-24 @ 05:17]  3.7   |  25  |  2.07        Ca     9.1     [09-06-24 @ 05:17]      Mg     2.4     [09-06-24 @ 05:17]      Phos  3.5     [09-06-24 @ 05:17]            Creatinine Trend:  SCr 2.07 [09-06 @ 05:17]  SCr 2.63 [09-05 @ 05:49]  SCr 2.34 [09-04 @ 06:26]  SCr 1.99 [09-03 @ 20:32]  SCr 1.83 [09-03 @ 07:31]              Iron 24, TIBC 395, %sat 6      [08-29-24 @ 06:04]  Ferritin 51      [08-29-24 @ 07:31]  HbA1c 8.9      [12-16-19 @ 08:15]    Free Light Chains: kappa 9.40, lambda 8.09, ratio = 1.16      [08-30 @ 06:49]  Immunofixation Serum:   Weak IgG Lambda Band Identified      Reference Range: None Detected      [08-30-24 @ 06:49]  SPEP Interpretation: Weak Gamma-Migrating Paraprotein Identified      [08-30-24 @ 06:49]

## 2024-09-06 NOTE — PROGRESS NOTE ADULT - PROBLEM SELECTOR PLAN 3
Etiology: ischemic  BB: restart toprol 25mg qd  ACE/ARB/ARNI: restart entresto 24-26mg once BP improves. hold for SBP <90mmhg  Afterload: hold hydralazine to 25mg TID  for now given hypotension  MRA: hold spironolactone 25mg qd  for now given hypotension  SGLT2: hold for now, consider restarting upon dc  Device: s/p S-ICD explant and EV-ICD reimplant this admission  Diuresis: volume status and weight drastically improved since presentation. hold diuresis for now given hypotension and likely overdiuresis.    S/p Iron sucrose x 5 days for iron deficiency in decompensated heart failure  CardioMEMS discussed with patient.  He is interested but would like to do this as an outpatient.    - would give 250cc NS given auto-diuresis and still mild hypotension  if BP improves, would restart low dose entresto and continue to monitor  once BP stabilizes, would also restart PO Bumex 2mg qd and assess for response

## 2024-09-06 NOTE — PROGRESS NOTE ADULT - ASSESSMENT
63 y.o. male with hx of ischemic cardiomyopathy with HFrEF 30%, cardiac arrest with prior transvenous ICD extraction for infection and subsequent [employer] S-ICD implant 2020, CKD 3, HTN, T2 DM, COPD, LUIS ALFREDO, and LLE amputee who presented after ICD shock.    Interrogation of S-ICD with inappropriate shock due to oversensing.  Pending S-ICD extraction and EV-ICD reimplantation w/ EP.  HF consulted for decompensated heart failure.    TTE 8/26/24   1. Left ventricular cavity is severely dilated. Left ventricular wall thickness is normal. Left ventricular systolic function is severely decreased with an ejection fraction of 22 % by Bell's method of disks. Regional wall motion abnormalities present.   2. Multiple segmental abnormalities exist. See findings.   3. Normal right ventricular cavity size, with normal wall thickness, and probably normal right ventricular systolic function.   4. Estimated pulmonary artery systolic pressure is 30 mmHg.   5. Trace pericardial effusion.    [facility] 11/18/19  LM:  --  LM: Normal.  LAD:   --  Ostial LAD: There was a 100 % stenosis.  CX:   --  Distal circumflex: There was a 80 % stenosis.  RI:   --  Ramus intermedius: Normal. There was no significant restenosis.  RCA:   --  Mid RCA: There was a 50 % stenosis.

## 2024-09-06 NOTE — PROGRESS NOTE ADULT - ASSESSMENT
A/P: 63M PMHx HFrEF s/p ICD, HTN, T2DM, s/p LLE amputation   Pt presented w/ ICD shock 30min prior to arrival to ED. Pt reports he's been having progressively worsening GREWAL, orthopnea and SOB for the past few day    Wound Consult requested to assist w/ management of:  RLE PVD w/ stasis dermatitis   LLE PAD s/p BKA      RLE- moisturize w/ Aveeno cream (doesn't like LacHydrin) and compression QDw/ elevation at rest  BLE elevation  Follow up w/ Vascular Surgery as outpatient ([place])       ADI/ PVR prior to multilayer/ garment compress for RLE  Follow up w/ Orthoptists for LLE fit      noted when pt on diuretic sock is too loose   Moisturize intact skin w/ SWEEN cream BID  Nutrition  optimization          encourage high quality protein, MVI & Vit C to promote wound healing  Hyperglycemia - ADA diet and FS w/ ISS   Continue turning and positioning w/ offloading assistive devices as per protocol  Buttocks/ Sacrum Kirstie BID and prn soiling       Continue w/ attends under pads and Pericare as per protocol  Waffle Cushion to chair when oob to chair  Continue w/ low air loss pressure redistribution bed surface   Care as per medicine, will follow w/ you  Upon discharge f/u as outpatient at [facility] [address] [place] [address] [phone]  Seen w/ attfay & RN and D/w team  Thank you for this consult  Oralia Esposito PA-C [id number]  Nights/ Weekends/ Holidays please call:  General Surgery Consult pager ([phone]) for emergencies  Wound PT for multilayer leg wrapping or VAC issues (x [phone])

## 2024-09-06 NOTE — PROGRESS NOTE ADULT - SUBJECTIVE AND OBJECTIVE BOX
Patient is a 63y old  Male who presents with a chief complaint of AICD firing (01 Sep 2024 11:21)      SUBJECTIVE / OVERNIGHT EVENTS:    Events noted.  CONSTITUTIONAL: S/p RRT  RESPIRATORY: No cough, wheezing,  No shortness of breath  CARDIOVASCULAR: No chest pain, palpitations,   GASTROINTESTINAL: No abdominal or epigastric pain.   NEUROLOGICAL: No headache    T(C): 36.6 (09-06-24 @ 11:18), Max: 36.6 (09-06-24 @ 11:18)  HR: 107 (09-06-24 @ 13:50) (99 - 107)  BP: 108/71 (09-06-24 @ 13:50) (99/64 - 108/71)  RR: 18 (09-06-24 @ 11:18) (18 - 18)  SpO2: 97% (09-06-24 @ 11:18) (96% - 97%)\      MEDICATIONS  (STANDING):  ammonium lactate 12% Lotion 1 Application(s) Topical every 12 hours  aspirin enteric coated 81 milliGRAM(s) Oral daily  buMETAnide 2 milliGRAM(s) Oral two times a day  clopidogrel Tablet 75 milliGRAM(s) Oral daily  dextrose 5%. 1000 milliLiter(s) (50 mL/Hr) IV Continuous <Continuous>  dextrose 5%. 1000 milliLiter(s) (100 mL/Hr) IV Continuous <Continuous>  dextrose 50% Injectable 12.5 Gram(s) IV Push once  dextrose 50% Injectable 25 Gram(s) IV Push once  hydrocortisone 1% Cream 1 Application(s) Topical two times a day  insulin glargine Injectable (LANTUS) 30 Unit(s) SubCutaneous at bedtime  insulin lispro (ADMELOG) corrective regimen sliding scale   SubCutaneous at bedtime  insulin lispro (ADMELOG) corrective regimen sliding scale   SubCutaneous three times a day before meals  insulin lispro Injectable (ADMELOG) 10 Unit(s) SubCutaneous three times a day before meals  levoFLOXacin  Tablet 500 milliGRAM(s) Oral every 24 hours  pantoprazole    Tablet 40 milliGRAM(s) Oral before breakfast  rosuvastatin 20 milliGRAM(s) Oral at bedtime  sodium chloride 0.65% Nasal 2 Spray(s) Both Nostrils four times a day  tamsulosin 0.4 milliGRAM(s) Oral at bedtime    MEDICATIONS  (PRN):  acetaminophen     Tablet .. 650 milliGRAM(s) Oral every 6 hours PRN Temp greater or equal to 38C (100.4F), Mild Pain (1 - 3)  aluminum hydroxide/magnesium hydroxide/simethicone Suspension 30 milliLiter(s) Oral every 4 hours PRN Dyspepsia  dextrose Oral Gel 15 Gram(s) Oral once PRN Blood Glucose LESS THAN 70 milliGRAM(s)/deciliter  diphenhydrAMINE 25 milliGRAM(s) Oral every 4 hours PRN Rash and/or Itching  melatonin 3 milliGRAM(s) Oral at bedtime PRN Insomnia  oxyCODONE    IR 5 milliGRAM(s) Oral every 4 hours PRN Moderate Pain (4 - 6)  oxyCODONE    IR 10 milliGRAM(s) Oral every 6 hours PRN Severe Pain (7 - 10)  PHYSICAL EXAM:    NECK: Supple, No JVD  CHEST/LUNG: Clear to auscultation bilaterally; No wheezing.  HEART: Regular rate and rhythm; No murmurs, rubs, or gallops  ABDOMEN: Soft, Nontender, Nondistended; Bowel sounds present  EXTREMITIES: LEFT BKA     No edema  NEUROLOGY: AAO                             9.8    6.88  )-----------( 193      ( 06 Sep 2024 05:17 )             35.4               136|97|72<127  3.7|25|2.07  9.1,2.4,3.5  09-06 @ 05:17

## 2024-09-06 NOTE — PROGRESS NOTE ADULT - SUBJECTIVE AND OBJECTIVE BOX
[facility]-- WOUND TEAM -- FOLLOW UP NOTE  --------------------------------------------------------------------------------    24 hour events/subjective:          Diet:  Diet, DASH/TLC:   Sodium & Cholesterol Restricted (09-04-24 @ 09:59)      ROS: General/ SKIN/ MSK/Vasc see HPI  all other systems negative      ALLERGIES & MEDICATIONS  --------------------------------------------------------------------------------  Allergies  ceftriaxone (Rash)  doxepin (Other)  Zosyn (Pruritus)  vancomycin (Rash (Mild to Mod))          STANDING INPATIENT MEDICATIONS  ammonium lactate 12% Lotion 1 Application(s) Topical every 12 hours  aspirin enteric coated 81 milliGRAM(s) Oral daily  buMETAnide 2 milliGRAM(s) Oral two times a day  clopidogrel Tablet 75 milliGRAM(s) Oral daily  dextrose 5%. 1000 milliLiter(s) IV Continuous <Continuous>  dextrose 5%. 1000 milliLiter(s) IV Continuous <Continuous>  dextrose 50% Injectable 12.5 Gram(s) IV Push once  dextrose 50% Injectable 25 Gram(s) IV Push once  hydrocortisone 1% Cream 1 Application(s) Topical two times a day  insulin glargine Injectable (LANTUS) 30 Unit(s) SubCutaneous at bedtime  insulin lispro (ADMELOG) corrective regimen sliding scale   SubCutaneous at bedtime  insulin lispro (ADMELOG) corrective regimen sliding scale   SubCutaneous three times a day before meals  insulin lispro Injectable (ADMELOG) 10 Unit(s) SubCutaneous three times a day before meals  levoFLOXacin Tablet 500 milliGRAM(s) Oral every 24 hours  pantoprazole Tablet 40 milliGRAM(s) Oral before breakfast  rosuvastatin 20 milliGRAM(s) Oral at bedtime  sodium chloride 0.65% Nasal 2 Spray(s) Both Nostrils four times a day  tamsulosin 0.4 milliGRAM(s) Oral at bedtime      PRN INPATIENT MEDICATION  acetaminophen Tablet 650 milliGRAM(s) Oral every 6 hours PRN  aluminum hydroxide/magnesium hydroxide/simethicone Suspension 30 milliLiter(s) Oral every 4 hours PRN  dextrose Oral Gel 15 Gram(s) Oral once PRN  diphenhydrAMINE 25 milliGRAM(s) Oral every 4 hours PRN  melatonin 3 milliGRAM(s) Oral at bedtime PRN  oxyCODONE IR 5 milliGRAM(s) Oral every 4 hours PRN  oxyCODONE IR 10 milliGRAM(s) Oral every 6 hours PRN        VITALS/PHYSICAL EXAM  --------------------------------------------------------------------------------  T(C): 36.6 (09-06-24 @ 11:18), Max: 36.6 (09-06-24 @ 11:18)  HR: 99 (09-06-24 @ 11:18) (99 - 108)  BP: 99/64 (09-06-24 @ 11:18) (98/66 - 102/69)  RR: 18 (09-06-24 @ 11:18) (18 - 18)  SpO2: 97% (09-06-24 @ 11:18) (96% - 97%)  Wt(kg): --                      LABS/ CULTURES/ RADIOLOGY:              9.8    6.88  >-----------<  193      [09-06-24 @ 05:17]              35.4     136  |  97  |  72  ----------------------------<  127      [09-06-24 @ 05:17]  3.7   |  25  |  2.07        Ca     9.1     [09-06-24 @ 05:17]      Mg     2.4     [09-06-24 @ 05:17]      Phos  3.5     [09-06-24 @ 05:17]        CAPILLARY BLOOD GLUCOSE  POCT Blood Glucose.: 154 mg/dL (06 Sep 2024 11:32)  POCT Blood Glucose.: 132 mg/dL (06 Sep 2024 07:35)  POCT Blood Glucose.: 136 mg/dL (05 Sep 2024 21:06)  POCT Blood Glucose.: 130 mg/dL (05 Sep 2024 17:07)                      A1C with Estimated Average Glucose Result: 7.5 % (08-25-24 @ 06:50)   [facility]-- WOUND TEAM -- FOLLOW UP NOTE  --------------------------------------------------------------------------------    24 hour events/subjective:    alert   afebrile  tolerating po w/o n/v  ambulating w/ LLE prosthesis  education provided for good care/ health of RLE  no c/o RLE weeping or pain or odor      Diet:  Diet, DASH/TLC:   Sodium & Cholesterol Restricted (09-04-24 @ 09:59)      ROS: General/ SKIN/ MSK/Vasc see HPI  all other systems negative      ALLERGIES & MEDICATIONS  --------------------------------------------------------------------------------  Allergies  ceftriaxone (Rash)  doxepin (Other)  Zosyn (Pruritus)  vancomycin (Rash (Mild to Mod))        STANDING INPATIENT MEDICATIONS  ammonium lactate 12% Lotion 1 Application(s) Topical every 12 hours  aspirin enteric coated 81 milliGRAM(s) Oral daily  buMETAnide 2 milliGRAM(s) Oral two times a day  clopidogrel Tablet 75 milliGRAM(s) Oral daily  dextrose 5%. 1000 milliLiter(s) IV Continuous <Continuous>  dextrose 5%. 1000 milliLiter(s) IV Continuous <Continuous>  dextrose 50% Injectable 12.5 Gram(s) IV Push once  dextrose 50% Injectable 25 Gram(s) IV Push once  hydrocortisone 1% Cream 1 Application(s) Topical two times a day  insulin glargine Injectable (LANTUS) 30 Unit(s) SubCutaneous at bedtime  insulin lispro (ADMELOG) corrective regimen sliding scale   SubCutaneous at bedtime  insulin lispro (ADMELOG) corrective regimen sliding scale   SubCutaneous three times a day before meals  insulin lispro Injectable (ADMELOG) 10 Unit(s) SubCutaneous three times a day before meals  levoFLOXacin Tablet 500 milliGRAM(s) Oral every 24 hours  pantoprazole Tablet 40 milliGRAM(s) Oral before breakfast  rosuvastatin 20 milliGRAM(s) Oral at bedtime  sodium chloride 0.65% Nasal 2 Spray(s) Both Nostrils four times a day  tamsulosin 0.4 milliGRAM(s) Oral at bedtime      PRN INPATIENT MEDICATION  acetaminophen Tablet 650 milliGRAM(s) Oral every 6 hours PRN  aluminum hydroxide/magnesium hydroxide/simethicone Suspension 30 milliLiter(s) Oral every 4 hours PRN  dextrose Oral Gel 15 Gram(s) Oral once PRN  diphenhydrAMINE 25 milliGRAM(s) Oral every 4 hours PRN  melatonin 3 milliGRAM(s) Oral at bedtime PRN  oxyCODONE IR 5 milliGRAM(s) Oral every 4 hours PRN  oxyCODONE IR 10 milliGRAM(s) Oral every 6 hours PRN        VITALS/PHYSICAL EXAM  --------------------------------------------------------------------------------  T(C): 36.6 (09-06-24 @ 11:18), Max: 36.6 (09-06-24 @ 11:18)  HR: 99 (09-06-24 @ 11:18) (99 - 108)  BP: 99/64 (09-06-24 @ 11:18) (98/66 - 102/69)  RR: 18 (09-06-24 @ 11:18) (18 - 18)  SpO2: 97% (09-06-24 @ 11:18) (96% - 97%)  Wt(kg): --      NAD,  A&Ox3, Obese  WD/ WN/ WG  [facility] P500 bed  HEENT:  NC/AT, EOMI, sclera clear, mucosa moist, throat clear, trachea midline, neck supple  Respiratory: nonlabored w/ equal chest rise  Gastrointestinal soft NT/ND   Neurology:  strength  grossly intact, paraesthesia   Psych: calm/ appropriate  Musculoskeletal:  FROM, no contractures Lt BKA  Vascular: RLE  warml,  no cyanosis, clubbing, no acute ischemia           RLE hemosiderin staining      RLE mechanically debrided w/ warm soapy cloth of scale and debris  Thickened cobblestoning skin noted      RLE dorsal foot w/ adherent serous crusted material  No odor, erythema, increased warmth, tenderness, induration, fluctuance, nor crepitus  Skin:  dry but w/ good turgor                LABS/ CULTURES/ RADIOLOGY:              9.8    6.88  >-----------<  193      [09-06-24 @ 05:17]              35.4     136  |  97  |  72  ----------------------------<  127      [09-06-24 @ 05:17]  3.7   |  25  |  2.07        Ca     9.1     [09-06-24 @ 05:17]      Mg     2.4     [09-06-24 @ 05:17]      Phos  3.5     [09-06-24 @ 05:17]        CAPILLARY BLOOD GLUCOSE  POCT Blood Glucose.: 154 mg/dL (06 Sep 2024 11:32)  POCT Blood Glucose.: 132 mg/dL (06 Sep 2024 07:35)  POCT Blood Glucose.: 136 mg/dL (05 Sep 2024 21:06)  POCT Blood Glucose.: 130 mg/dL (05 Sep 2024 17:07)                      A1C with Estimated Average Glucose Result: 7.5 % (08-25-24 @ 06:50)

## 2024-09-06 NOTE — PROGRESS NOTE ADULT - PROBLEM SELECTOR PLAN 4
MANJIT   Diuresed extensively in prior days. Now has hypotension and MANJIT likely due to overdiuresis.  Hold Entresto, Albert and Toprol     Still hypovolemic today   Administer 250cc NS today -c/w asa and plavix-monitor BMP daily, dose meds per renal fx, avoid nephrotoxins

## 2024-09-06 NOTE — PROGRESS NOTE ADULT - SUBJECTIVE AND OBJECTIVE BOX
Heart Failure Progress Note  ------------------------------------------------------------------------------------------  SUBJECTIVE:   NAEON  BP improved with holding diuresis and GDMT  Still auto-diuresing  No complaints this AM    VS:  T(F): 97.8 (09-06), Max: 97.8 (09-06)  HR: 99 (09-06) (99 - 108)  BP: 99/64 (09-06) (98/66 - 102/69)  RR: 18 (09-06)  SpO2: 97% (09-06)  I&O's Summary    05 Sep 2024 07:01  -  06 Sep 2024 07:00  --------------------------------------------------------  IN: 760 mL / OUT: 1950 mL / NET: -1190 mL    06 Sep 2024 07:01  -  06 Sep 2024 11:52  --------------------------------------------------------  IN: 300 mL / OUT: 700 mL / NET: -400 mL      PHYSICAL EXAM:  GENERAL: NAD  HEAD: Atraumatic, Normocephalic.  EYES: EOMI, PERRLA, conjunctiva and sclera clear.  ENT: Moist mucous membranes.  NECK: Supple, + JVD to mid neck  CHEST/LUNG: Diminished breath sounds at bases bilaterally  HEART: Regular rate and rhythm; No murmurs, rubs, or gallops.  ABDOMEN: Bowel sounds present; Mild abdominal distension  EXTREMITIES:  +LLE amputation. Chronic venous stasis changes of RLE. +pitting edema to upper thigh  PSYCH: Normal affect.  SKIN: No rashes or lesions.  -------------------------------------------------------------------------------------------  LABS:                          9.8    6.88  )-----------( 193      ( 06 Sep 2024 05:17 )             35.4     09-06    136  |  97  |  72<H>  ----------------------------<  127<H>  3.7   |  25  |  2.07<H>    Ca    9.1      06 Sep 2024 05:17  Phos  3.5     09-06  Mg     2.4     09-06                  -------------------------------------------------------------------------------------------  Meds:  acetaminophen     Tablet .. 650 milliGRAM(s) Oral every 6 hours PRN  aluminum hydroxide/magnesium hydroxide/simethicone Suspension 30 milliLiter(s) Oral every 4 hours PRN  ammonium lactate 12% Lotion 1 Application(s) Topical every 12 hours  aspirin enteric coated 81 milliGRAM(s) Oral daily  buMETAnide 4 milliGRAM(s) Oral daily  clopidogrel Tablet 75 milliGRAM(s) Oral daily  dextrose 5%. 1000 milliLiter(s) IV Continuous <Continuous>  dextrose 5%. 1000 milliLiter(s) IV Continuous <Continuous>  dextrose 50% Injectable 12.5 Gram(s) IV Push once  dextrose 50% Injectable 25 Gram(s) IV Push once  dextrose Oral Gel 15 Gram(s) Oral once PRN  diphenhydrAMINE 25 milliGRAM(s) Oral every 4 hours PRN  hydrocortisone 1% Cream 1 Application(s) Topical two times a day  insulin glargine Injectable (LANTUS) 30 Unit(s) SubCutaneous at bedtime  insulin lispro (ADMELOG) corrective regimen sliding scale   SubCutaneous at bedtime  insulin lispro (ADMELOG) corrective regimen sliding scale   SubCutaneous three times a day before meals  insulin lispro Injectable (ADMELOG) 10 Unit(s) SubCutaneous three times a day before meals  levoFLOXacin  Tablet 500 milliGRAM(s) Oral every 24 hours  melatonin 3 milliGRAM(s) Oral at bedtime PRN  oxyCODONE    IR 5 milliGRAM(s) Oral every 4 hours PRN  oxyCODONE    IR 10 milliGRAM(s) Oral every 6 hours PRN  pantoprazole    Tablet 40 milliGRAM(s) Oral before breakfast  rosuvastatin 20 milliGRAM(s) Oral at bedtime  sodium chloride 0.65% Nasal 2 Spray(s) Both Nostrils four times a day  tamsulosin 0.4 milliGRAM(s) Oral at bedtime    -------------------------------------------------------------------------------------------

## 2024-09-07 LAB
ADD ON TEST-SPECIMEN IN LAB: SIGNIFICANT CHANGE UP
ANION GAP SERPL CALC-SCNC: 16 MMOL/L — SIGNIFICANT CHANGE UP (ref 5–17)
BUN SERPL-MCNC: 62 MG/DL — HIGH (ref 7–23)
CALCIUM SERPL-MCNC: 9.2 MG/DL — SIGNIFICANT CHANGE UP (ref 8.4–10.5)
CHLORIDE SERPL-SCNC: 100 MMOL/L — SIGNIFICANT CHANGE UP (ref 96–108)
CO2 SERPL-SCNC: 25 MMOL/L — SIGNIFICANT CHANGE UP (ref 22–31)
CREAT SERPL-MCNC: 1.95 MG/DL — HIGH (ref 0.5–1.3)
EGFR: 38 ML/MIN/1.73M2 — LOW
GLUCOSE BLDC GLUCOMTR-MCNC: 117 MG/DL — HIGH (ref 70–99)
GLUCOSE BLDC GLUCOMTR-MCNC: 148 MG/DL — HIGH (ref 70–99)
GLUCOSE BLDC GLUCOMTR-MCNC: 188 MG/DL — HIGH (ref 70–99)
GLUCOSE BLDC GLUCOMTR-MCNC: 216 MG/DL — HIGH (ref 70–99)
GLUCOSE SERPL-MCNC: 194 MG/DL — HIGH (ref 70–99)
HCT VFR BLD CALC: 35.1 % — LOW (ref 39–50)
HGB BLD-MCNC: 9.8 G/DL — LOW (ref 13–17)
MAGNESIUM SERPL-MCNC: 2.4 MG/DL — SIGNIFICANT CHANGE UP (ref 1.6–2.6)
MCHC RBC-ENTMCNC: 20.1 PG — LOW (ref 27–34)
MCHC RBC-ENTMCNC: 27.9 GM/DL — LOW (ref 32–36)
MCV RBC AUTO: 72.1 FL — LOW (ref 80–100)
NRBC # BLD: 0 /100 WBCS — SIGNIFICANT CHANGE UP (ref 0–0)
PLATELET # BLD AUTO: 200 K/UL — SIGNIFICANT CHANGE UP (ref 150–400)
POTASSIUM SERPL-MCNC: 4 MMOL/L — SIGNIFICANT CHANGE UP (ref 3.5–5.3)
POTASSIUM SERPL-SCNC: 4 MMOL/L — SIGNIFICANT CHANGE UP (ref 3.5–5.3)
RBC # BLD: 4.87 M/UL — SIGNIFICANT CHANGE UP (ref 4.2–5.8)
RBC # FLD: 21.9 % — HIGH (ref 10.3–14.5)
SODIUM SERPL-SCNC: 141 MMOL/L — SIGNIFICANT CHANGE UP (ref 135–145)
WBC # BLD: 5.95 K/UL — SIGNIFICANT CHANGE UP (ref 3.8–10.5)
WBC # FLD AUTO: 5.95 K/UL — SIGNIFICANT CHANGE UP (ref 3.8–10.5)

## 2024-09-07 RX ADMIN — Medication 75 MILLIGRAM(S): at 11:39

## 2024-09-07 RX ADMIN — BUMETANIDE 2 MILLIGRAM(S): 2 TABLET ORAL at 13:51

## 2024-09-07 RX ADMIN — OXYCODONE HYDROCHLORIDE 10 MILLIGRAM(S): 5 TABLET ORAL at 21:57

## 2024-09-07 RX ADMIN — Medication 10 UNIT(S): at 11:39

## 2024-09-07 RX ADMIN — Medication 1: at 08:11

## 2024-09-07 RX ADMIN — Medication 40 MILLIGRAM(S): at 05:20

## 2024-09-07 RX ADMIN — TAMSULOSIN HYDROCHLORIDE 0.4 MILLIGRAM(S): 0.4 CAPSULE ORAL at 21:28

## 2024-09-07 RX ADMIN — Medication 81 MILLIGRAM(S): at 11:38

## 2024-09-07 RX ADMIN — ROSUVASTATIN CALCIUM 20 MILLIGRAM(S): 10 TABLET ORAL at 21:28

## 2024-09-07 RX ADMIN — INSULIN GLARGINE 30 UNIT(S): 100 INJECTION, SOLUTION SUBCUTANEOUS at 21:29

## 2024-09-07 RX ADMIN — Medication 10 UNIT(S): at 17:13

## 2024-09-07 RX ADMIN — Medication 0: at 21:27

## 2024-09-07 RX ADMIN — OXYCODONE HYDROCHLORIDE 10 MILLIGRAM(S): 5 TABLET ORAL at 21:27

## 2024-09-07 RX ADMIN — Medication 10 UNIT(S): at 08:11

## 2024-09-07 RX ADMIN — BUMETANIDE 2 MILLIGRAM(S): 2 TABLET ORAL at 05:20

## 2024-09-07 NOTE — PROGRESS NOTE ADULT - ASSESSMENT
63 year old Male with PMHx HFrEF s/p ICD, HTN, T2DM, s/p LLE amputation in [place] ~2 months ago. Pt presented to ED for ICD shock.        1- CKDIII  2- CHF  3- DM2  4- anemia  5- cellulitis   6- rash         servando in setting of overdiuresis leading to hypotension  creatinine returned to baseline  cont bumex 2mg po bid  resume entresto   no contraindication to resume entresto  steroid/benadryl  strict I/O

## 2024-09-07 NOTE — PROGRESS NOTE ADULT - SUBJECTIVE AND OBJECTIVE BOX
[facility]   [phone]  RENAL FOLLOW UP NOTE  --------------------------------------------------------------------------------  Chief Complaint:    24 hour events/subjective:    seen earlier   states itching is better     PAST HISTORY  --------------------------------------------------------------------------------  No significant changes to PMH, PSH, FHx, SHx, unless otherwise noted    ALLERGIES & MEDICATIONS  --------------------------------------------------------------------------------  Allergies    ceftriaxone (Rash)  doxepin (Other)  Zosyn (Pruritus)  vancomycin (Rash (Mild to Mod))    Intolerances      Standing Inpatient Medications  ammonium lactate 12% Lotion 1 Application(s) Topical every 12 hours  aspirin enteric coated 81 milliGRAM(s) Oral daily  buMETAnide 2 milliGRAM(s) Oral two times a day  clopidogrel Tablet 75 milliGRAM(s) Oral daily  dextrose 5%. 1000 milliLiter(s) IV Continuous <Continuous>  dextrose 5%. 1000 milliLiter(s) IV Continuous <Continuous>  dextrose 50% Injectable 12.5 Gram(s) IV Push once  dextrose 50% Injectable 25 Gram(s) IV Push once  hydrocortisone 1% Cream 1 Application(s) Topical two times a day  insulin glargine Injectable (LANTUS) 30 Unit(s) SubCutaneous at bedtime  insulin lispro (ADMELOG) corrective regimen sliding scale   SubCutaneous at bedtime  insulin lispro (ADMELOG) corrective regimen sliding scale   SubCutaneous three times a day before meals  insulin lispro Injectable (ADMELOG) 10 Unit(s) SubCutaneous three times a day before meals  pantoprazole    Tablet 40 milliGRAM(s) Oral before breakfast  rosuvastatin 20 milliGRAM(s) Oral at bedtime  sodium chloride 0.65% Nasal 2 Spray(s) Both Nostrils four times a day  tamsulosin 0.4 milliGRAM(s) Oral at bedtime    PRN Inpatient Medications  acetaminophen     Tablet .. 650 milliGRAM(s) Oral every 6 hours PRN  aluminum hydroxide/magnesium hydroxide/simethicone Suspension 30 milliLiter(s) Oral every 4 hours PRN  dextrose Oral Gel 15 Gram(s) Oral once PRN  diphenhydrAMINE 25 milliGRAM(s) Oral every 4 hours PRN  melatonin 3 milliGRAM(s) Oral at bedtime PRN  oxyCODONE    IR 5 milliGRAM(s) Oral every 4 hours PRN  oxyCODONE    IR 10 milliGRAM(s) Oral every 6 hours PRN      REVIEW OF SYSTEMS  --------------------------------------------------------------------------------    Gen: denies  fevers/chills,  CVS: denies chest pain/palpitations  Resp: denies SOB/Cough  GI: Denies N/V/Abd pain  : Denies dysuria    VITALS/PHYSICAL EXAM  --------------------------------------------------------------------------------  T(C): 37 (09-07-24 @ 20:30), Max: 37 (09-07-24 @ 20:30)  HR: 102 (09-07-24 @ 20:30) (97 - 104)  BP: 105/68 (09-07-24 @ 20:30) (104/69 - 106/70)  RR: 18 (09-07-24 @ 20:30) (18 - 18)  SpO2: 94% (09-07-24 @ 20:30) (94% - 96%)  Wt(kg): --        09-06-24 @ 07:01  -  09-07-24 @ 07:00  --------------------------------------------------------  IN: 700 mL / OUT: 3300 mL / NET: -2600 mL    09-07-24 @ 07:01  -  09-07-24 @ 21:24  --------------------------------------------------------  IN: 480 mL / OUT: 4650 mL / NET: -4170 mL      Physical Exam:  	    	Gen: Non toxic comfortable appearing   	Pulm: decrease bs  no rales or ronchi or wheezing  	CV: +JVD, RRR, S1S2; no rub  	Abd: +BS, soft, nontender/+distended, +abd wall edema, improved  	UE: Warm, no cyanosis  no clubbing,  no edema;  	LE: Warm, no cyanosis  no clubbing, RLE chronic venous stasis, edema decreased, LLE BKA   	Neuro: alert and oriented. speech coherent     LABS/STUDIES  --------------------------------------------------------------------------------              9.8    5.95  >-----------<  200      [09-07-24 @ 07:23]              35.1     141  |  100  |  62  ----------------------------<  194      [09-07-24 @ 07:15]  4.0   |  25  |  1.95        Ca     9.2     [09-07-24 @ 07:15]      Mg     2.4     [09-07-24 @ 07:15]      Phos  3.5     [09-06-24 @ 05:17]            Creatinine Trend:  SCr 1.95 [09-07 @ 07:15]  SCr 2.07 [09-06 @ 05:17]  SCr 2.63 [09-05 @ 05:49]  SCr 2.34 [09-04 @ 06:26]  SCr 1.99 [09-03 @ 20:32]        Iron 24, TIBC 395, %sat 6      [08-29-24 @ 06:04]  Ferritin 51      [08-29-24 @ 07:31]  HbA1c 8.9      [12-16-19 @ 08:15]    Free Light Chains: kappa 9.40, lambda 8.09, ratio = 1.16      [08-30 @ 06:49]  Immunofixation Serum:   Weak IgG Lambda Band Identified      Reference Range: None Detected      [08-30-24 @ 06:49]  SPEP Interpretation: Weak Gamma-Migrating Paraprotein Identified      [08-30-24 @ 06:49]

## 2024-09-07 NOTE — PROGRESS NOTE ADULT - SUBJECTIVE AND OBJECTIVE BOX
Patient is a 63y old  Male who presents with a chief complaint of AICD firing (01 Sep 2024 11:21)      SUBJECTIVE / OVERNIGHT EVENTS:    Events noted.  CONSTITUTIONAL: S/p RRT  RESPIRATORY: No cough, wheezing,  No shortness of breath  CARDIOVASCULAR: No chest pain, palpitations,   GASTROINTESTINAL: No abdominal or epigastric pain.   NEUROLOGICAL: No headache    T(C): 36.8 (09-07-24 @ 14:59), Max: 36.8 (09-07-24 @ 14:59)  HR: 97 (09-07-24 @ 14:59) (97 - 97)  BP: 106/70 (09-07-24 @ 14:59) (106/70 - 106/70)  RR: 18 (09-07-24 @ 14:59) (18 - 18)  SpO2: 96% (09-07-24 @ 14:59) (96% - 96%)      MEDICATIONS  (STANDING):  ammonium lactate 12% Lotion 1 Application(s) Topical every 12 hours  aspirin enteric coated 81 milliGRAM(s) Oral daily  buMETAnide 2 milliGRAM(s) Oral two times a day  clopidogrel Tablet 75 milliGRAM(s) Oral daily  dextrose 5%. 1000 milliLiter(s) (50 mL/Hr) IV Continuous <Continuous>  dextrose 5%. 1000 milliLiter(s) (100 mL/Hr) IV Continuous <Continuous>  dextrose 50% Injectable 12.5 Gram(s) IV Push once  dextrose 50% Injectable 25 Gram(s) IV Push once  hydrocortisone 1% Cream 1 Application(s) Topical two times a day  insulin glargine Injectable (LANTUS) 30 Unit(s) SubCutaneous at bedtime  insulin lispro (ADMELOG) corrective regimen sliding scale   SubCutaneous three times a day before meals  insulin lispro (ADMELOG) corrective regimen sliding scale   SubCutaneous at bedtime  insulin lispro Injectable (ADMELOG) 10 Unit(s) SubCutaneous three times a day before meals  pantoprazole    Tablet 40 milliGRAM(s) Oral before breakfast  rosuvastatin 20 milliGRAM(s) Oral at bedtime  sodium chloride 0.65% Nasal 2 Spray(s) Both Nostrils four times a day  tamsulosin 0.4 milliGRAM(s) Oral at bedtime    MEDICATIONS  (PRN):  acetaminophen     Tablet .. 650 milliGRAM(s) Oral every 6 hours PRN Temp greater or equal to 38C (100.4F), Mild Pain (1 - 3)  aluminum hydroxide/magnesium hydroxide/simethicone Suspension 30 milliLiter(s) Oral every 4 hours PRN Dyspepsia  dextrose Oral Gel 15 Gram(s) Oral once PRN Blood Glucose LESS THAN 70 milliGRAM(s)/deciliter  diphenhydrAMINE 25 milliGRAM(s) Oral every 4 hours PRN Rash and/or Itching  melatonin 3 milliGRAM(s) Oral at bedtime PRN Insomnia  oxyCODONE    IR 5 milliGRAM(s) Oral every 4 hours PRN Moderate Pain (4 - 6)  oxyCODONE    IR 10 milliGRAM(s) Oral every 6 hours PRN Severe Pain (7 - 10)          NECK: Supple, No JVD  CHEST/LUNG: Clear to auscultation bilaterally; No wheezing.  HEART: Regular rate and rhythm; No murmurs, rubs, or gallops  ABDOMEN: Soft, Nontender, Nondistended; Bowel sounds present  EXTREMITIES: LEFT BKA     No edema  NEUROLOGY: AAO                                        9.8    5.95  )-----------( 200      ( 07 Sep 2024 07:23 )             35.1               141|100|62<194  4.0|25|1.95  9.2,2.4,--  09-07 @ 07:15

## 2024-09-07 NOTE — PROGRESS NOTE ADULT - SUBJECTIVE AND OBJECTIVE BOX
DATE OF SERVICE: 09-07-24 @ 16:07    Patient is a 63y old  Male who presents with a chief complaint of AICD firing (01 Sep 2024 11:21)      INTERVAL HISTORY: no complaints     REVIEW OF SYSTEMS:  CONSTITUTIONAL: No weakness  EYES/ENT: No visual changes;  No throat pain   NECK: No pain or stiffness  RESPIRATORY: No cough, wheezing; No shortness of breath  CARDIOVASCULAR: No chest pain or palpitations  GASTROINTESTINAL: No abdominal  pain. No nausea, vomiting, or hematemesis  GENITOURINARY: No dysuria, frequency or hematuria  NEUROLOGICAL: No stroke like symptoms  SKIN: No rashes    	  MEDICATIONS:  buMETAnide 2 milliGRAM(s) Oral two times a day        PHYSICAL EXAM:  T(C): 36.8 (09-07-24 @ 14:59), Max: 36.8 (09-07-24 @ 14:59)  HR: 97 (09-07-24 @ 14:59) (97 - 108)  BP: 106/70 (09-07-24 @ 14:59) (103/68 - 106/70)  RR: 18 (09-07-24 @ 14:59) (18 - 18)  SpO2: 96% (09-07-24 @ 14:59) (94% - 96%)  Wt(kg): --  I&O's Summary    06 Sep 2024 07:01  -  07 Sep 2024 07:00  --------------------------------------------------------  IN: 700 mL / OUT: 3300 mL / NET: -2600 mL    07 Sep 2024 07:01  -  07 Sep 2024 16:07  --------------------------------------------------------  IN: 480 mL / OUT: 2150 mL / NET: -1670 mL          Appearance: In no distress	  HEENT:    PERRL, EOMI	  Cardiovascular:  S1 S2, No JVD  Respiratory: Lungs clear to auscultation	  Gastrointestinal:  Soft, Non-tender, + BS	  Vascularature:  No edema of LE  Psychiatric: Appropriate affect   Neuro: no acute focal deficits                               9.8    5.95  )-----------( 200      ( 07 Sep 2024 07:23 )             35.1     09-07    141  |  100  |  62<H>  ----------------------------<  194<H>  4.0   |  25  |  1.95<H>    Ca    9.2      07 Sep 2024 07:15  Phos  3.5     09-06  Mg     2.4     09-07          Labs personally reviewed      ASSESSMENT/PLAN: 	      Problem/Plan - 1:  ·  Problem: Acute decompensated systolic heart failure.   ·  Plan:    - TTE 8/26 similar to prior wiith EF 20%  - Will slowly resume sHF GDMT as BP tolerates  --- Toprol 25mg daily  --- Entresto 24/26mg BID  --- Aldactone 25mg  --- Jardiance 10mg daily  --- Home dose Bumex 4mg PO daily  - Appreciate HF recommendations; Possible CardioMems this admission ? pt prefers to do as outpt  - As per wife, dry weight ~247lbs  - 9/4 Cr stable/BUN uptrending a/w RRT for hypotension. All HF meds and diuretics on hold. S/p IVF bolus and Albumin with improvement in BP and lactate.  - 9/5 with improvement in BP. Plan to slowly resume HF meds and monitor BP and BUN/Cr.  - TTE with trace pericardial effusion with no evidence of tamponade. No further interval changes.      Problem/Plan - 2:  ·  Problem: CAD (coronary artery disease).   ·  Plan: c/w asa and Plavix    Problem/Plan - 3:  ·  Problem: Chronic kidney disease, unspecified CKD stage.   ·  Plan: Monitor BMP daily, dose meds per renal fx, avoid nephrotoxins.    Problem/Plan - 4:  ·  Problem: ICD shock   ·  Plan: Inappropriate as per EP  - settings adjusted  - s/p ICD extraction and re-implant, (9/3)  - Current ICD disabled--> pacing pads on patient    Problem/Plan - 5:  ·  Problem: Prophylactic measure.   ·  Plan: VTE ppx: hep sq           KRISHNA Apodaca, DO [facility]  Cardiovascular Medicine  [address] [facility], [address]  Office: [phone]  Available via call/text on [employer] Teams

## 2024-09-08 LAB
ANION GAP SERPL CALC-SCNC: 15 MMOL/L — SIGNIFICANT CHANGE UP (ref 5–17)
BUN SERPL-MCNC: 58 MG/DL — HIGH (ref 7–23)
CALCIUM SERPL-MCNC: 9.4 MG/DL — SIGNIFICANT CHANGE UP (ref 8.4–10.5)
CHLORIDE SERPL-SCNC: 97 MMOL/L — SIGNIFICANT CHANGE UP (ref 96–108)
CO2 SERPL-SCNC: 27 MMOL/L — SIGNIFICANT CHANGE UP (ref 22–31)
CREAT SERPL-MCNC: 1.81 MG/DL — HIGH (ref 0.5–1.3)
EGFR: 42 ML/MIN/1.73M2 — LOW
GLUCOSE BLDC GLUCOMTR-MCNC: 129 MG/DL — HIGH (ref 70–99)
GLUCOSE BLDC GLUCOMTR-MCNC: 160 MG/DL — HIGH (ref 70–99)
GLUCOSE BLDC GLUCOMTR-MCNC: 163 MG/DL — HIGH (ref 70–99)
GLUCOSE BLDC GLUCOMTR-MCNC: 172 MG/DL — HIGH (ref 70–99)
GLUCOSE SERPL-MCNC: 189 MG/DL — HIGH (ref 70–99)
MAGNESIUM SERPL-MCNC: 2.3 MG/DL — SIGNIFICANT CHANGE UP (ref 1.6–2.6)
POTASSIUM SERPL-MCNC: 3.6 MMOL/L — SIGNIFICANT CHANGE UP (ref 3.5–5.3)
POTASSIUM SERPL-SCNC: 3.6 MMOL/L — SIGNIFICANT CHANGE UP (ref 3.5–5.3)
SODIUM SERPL-SCNC: 139 MMOL/L — SIGNIFICANT CHANGE UP (ref 135–145)

## 2024-09-08 RX ORDER — SACUBITRIL AND VALSARTAN 49; 51 MG/1; MG/1
1 TABLET, FILM COATED ORAL
Refills: 0 | Status: DISCONTINUED | OUTPATIENT
Start: 2024-09-08 | End: 2024-09-09

## 2024-09-08 RX ADMIN — Medication 10 UNIT(S): at 17:35

## 2024-09-08 RX ADMIN — Medication 75 MILLIGRAM(S): at 12:00

## 2024-09-08 RX ADMIN — Medication 1: at 12:00

## 2024-09-08 RX ADMIN — Medication 40 MILLIGRAM(S): at 05:25

## 2024-09-08 RX ADMIN — SACUBITRIL AND VALSARTAN 1 TABLET(S): 49; 51 TABLET, FILM COATED ORAL at 17:36

## 2024-09-08 RX ADMIN — Medication 10 UNIT(S): at 08:12

## 2024-09-08 RX ADMIN — OXYCODONE HYDROCHLORIDE 10 MILLIGRAM(S): 5 TABLET ORAL at 21:21

## 2024-09-08 RX ADMIN — SACUBITRIL AND VALSARTAN 1 TABLET(S): 49; 51 TABLET, FILM COATED ORAL at 11:59

## 2024-09-08 RX ADMIN — Medication 1: at 08:11

## 2024-09-08 RX ADMIN — OXYCODONE HYDROCHLORIDE 10 MILLIGRAM(S): 5 TABLET ORAL at 22:21

## 2024-09-08 RX ADMIN — Medication 81 MILLIGRAM(S): at 12:00

## 2024-09-08 RX ADMIN — TAMSULOSIN HYDROCHLORIDE 0.4 MILLIGRAM(S): 0.4 CAPSULE ORAL at 21:19

## 2024-09-08 RX ADMIN — Medication 10 UNIT(S): at 12:01

## 2024-09-08 RX ADMIN — BUMETANIDE 2 MILLIGRAM(S): 2 TABLET ORAL at 05:25

## 2024-09-08 RX ADMIN — INSULIN GLARGINE 30 UNIT(S): 100 INJECTION, SOLUTION SUBCUTANEOUS at 21:19

## 2024-09-08 RX ADMIN — ROSUVASTATIN CALCIUM 20 MILLIGRAM(S): 10 TABLET ORAL at 21:20

## 2024-09-08 RX ADMIN — BUMETANIDE 2 MILLIGRAM(S): 2 TABLET ORAL at 13:02

## 2024-09-08 NOTE — DISCHARGE NOTE PROVIDER - NSDCFUADDAPPT_GEN_ALL_CORE_FT
APPTS ARE READY TO BE MADE: [X ] YES    Best Family or Patient Contact (if needed):    Additional Information about above appointments (if needed):    1:   2:   3:     Other comments or requests:    APPTS ARE READY TO BE MADE: [X ] YES    Best Family or Patient Contact (if needed):    Additional Information about above appointments (if needed):    1:   2:   3:     Other comments or requests:   Provided patient with provider referral information, however patient prefers to schedule the appointments on their own.

## 2024-09-08 NOTE — PROGRESS NOTE ADULT - PROBLEM SELECTOR PROBLEM 1
Acute decompensated heart failure
CAD (coronary artery disease)
Acute decompensated heart failure
CAD (coronary artery disease)
Acute decompensated heart failure
Epistaxis
Acute decompensated heart failure
CAD (coronary artery disease)
Acute decompensated heart failure
CAD (coronary artery disease)
Acute decompensated heart failure

## 2024-09-08 NOTE — PROGRESS NOTE ADULT - PROBLEM SELECTOR PLAN 1
- continue asa, plavix, statin.
Avoid manipulating nasal packing. it will dissolve on its own  Nasal saline, 2 sprays to B/L nares QID  Afrin 2 sprays in b/l nares q12 hrs prn for 3 days maximum for active epistaxis  Strict blood pressure control  Avoid nasal trauma: no nose rubbing or blowing. Sneeze with mouth open while pinching nares  Avoid bending with head below the waist, No heavy lifting  oral care  Follow up at [facility] clinic call [phone] to make an appointment. [address] [facility], [place], [address].  Call with questions or concerns [phone]
Heart failure, CHF team follow up appreciated  Off Bumex drip  S/p IVF bolus  TTE w/ trace L pericardial effusion no tamponade, IR consulted for possible pericardiocentesis
Heart failure, CHF team following   creatinine expected to rise and fluid status improves    Bumex drip as per cards   '    ICD implantation this admission   MEMs
Heart failure, CHF team following   creatinine expected to rise and fluid status improves    Bumex drip as per cards   '    ICD implantation Tuesday
-received bumex 2mg ivp in the ED. ordered for 4mg iv bumex bid for now.   -strict i/o  -no TTE available in the chart w/in last yr.     CHOOSE ONE: HFpEF or HFrEF  (EF = 25% on Date 03/2023)  Heart failure, CHF order set initiated    Daily weight to be reviewed today (decreased/stable/increased).  Guideline directed medical therapy as below: (Name/Dose/Frequency)  - Diuretic: actively diuresing   -   creatinine expected to rise and fluid status improves  continue bumex 4 mg iv bid per cards  renal following
- continue asa, plavix, statin.
- continue asa, plavix, statin.
-received bumex 2mg ivp in the ED. ordered for 4mg iv bumex bid for now.   -strict i/o  -no TTE available in the chart w/in last yr.     CHOOSE ONE: HFpEF or HFrEF  (EF = 25% on Date 03/2023)  Heart failure, CHF order set initiated    Daily weight to be reviewed today (decreased/stable/increased).  Guideline directed medical therapy as below: (Name/Dose/Frequency)  - Diuretic: actively diuresing   -   creatinine expected to rise and fluid status improves  continue bumex 4 mg iv bid per cards
Heart failure, CHF team follow up appreciated  Off Bumex drip  TTE w/ trace L pericardial effusion no tamponade, IR  f/up
- continue asa, plavix, statin.
-received bumex 2mg ivp in the ED. ordered for 4mg iv bumex bid for now.   -strict i/o  -no TTE available in the chart w/in last yr.     CHOOSE ONE: HFpEF or HFrEF  (EF = 25% on Date 03/2023)  Heart failure, CHF order set initiated    Daily weight to be reviewed today (decreased/stable/increased).  Guideline directed medical therapy as below: (Name/Dose/Frequency)  - Diuretic: actively diuresing   - BB: toprol 25  - entresto 24/26  - MRA: not on it   - SGLT2: jardiance 10
Heart failure, CHF team follow up appreciated  Off Bumex drip  TTE w/ trace L pericardial effusion no tamponade, IR  f/up    MANJIT   Diuresed extensively in prior days. Now has hypotension and MANJIT likely due to overdiuresis.  Hold Entresto, Albert and Toprol     Still hypovolemic today   Administer 250cc NS today
Heart failure, CHF team following   creatinine expected to rise and fluid status improves    Bumex drip as per cards   '    ICD implantation Tuesday
-received bumex 2mg ivp in the ED. ordered for 4mg iv bumex bid for now.   -strict i/o  -no TTE available in the chart w/in last yr.     CHOOSE ONE: HFpEF or HFrEF  (EF = 25% on Date 03/2023)  Heart failure, CHF order set initiated    Daily weight to be reviewed today (decreased/stable/increased).  Guideline directed medical therapy as below: (Name/Dose/Frequency)  - Diuretic: actively diuresing   -   creatinine expected to rise and fluid status improves  Diuresis: start bumex gtt at 1 mg/hr during the day from 8a-8p (to allow for rest overnight). as per HF   Prior to starting gtt in the morning, would bolus dose 4mg IV bumex.    ICD implantation this admission   MEMs
- continue asa, plavix, statin.
Heart failure, CHF team follow up appreciated  Off Bumex drip  TTE w/ trace L pericardial effusion no tamponade, IR  f/up    MANJIT   Diuresed extensively in prior days. Now has hypotension and MANJIT likely due to overdiuresis.  Hold Entresto, Albert and Toprol
Heart failure, CHF team follow up appreciated  Off Bumex drip  TTE w/ trace L pericardial effusion no tamponade, IR  f/up    MANJIT slowly resume sHF GDMT as BP tolerates  --- Toprol 25mg daily - resume 9/9 if BP tolerates  --- Entresto 24/26mg BID resumed
- continue asa, plavix, statin.
-received bumex 2mg ivp in the ED. ordered for 4mg iv bumex bid for now.   -strict i/o  -no TTE available in the chart w/in last yr.     CHOOSE ONE: HFpEF or HFrEF  (EF = 25% on Date 03/2023)  Heart failure, CHF order set initiated    Daily weight to be reviewed today (decreased/stable/increased).  Guideline directed medical therapy as below: (Name/Dose/Frequency)  - Diuretic: actively diuresing   -   creatinine expected to rise and fluid status improves  continue bumex 4 mg iv bid per cards  renal following  HF consulted     ICD implantation this admission   MEMs
Heart failure, CHF team following   creatinine expected to rise and fluid status improves    Bumex drip as per cards   '    ICD implantation today

## 2024-09-08 NOTE — DISCHARGE NOTE PROVIDER - NSDCCPCAREPLAN_GEN_ALL_CORE_FT
PRINCIPAL DISCHARGE DIAGNOSIS  Diagnosis: Acute on chronic systolic congestive heart failure  Assessment and Plan of Treatment: Weigh yourself daily.  If you gain 3lbs in 3 days, or 5lbs in a week call your Health Care Provider.  Do not eat or drink foods containing more than 2000mg of salt (sodium) in your diet every day.  Call your Health Care Provider if you have any swelling or increased swelling in your feet, ankles, and/or stomach.  Take all of your medication as directed.  If you become dizzy call your Health Care Provider.        SECONDARY DISCHARGE DIAGNOSES  Diagnosis: Inappropriate shocks from ICD (implantable cardioverter-defibrillator)  Assessment and Plan of Treatment: You underwent ICD explant and reimplant on 9/3. Follow up with EP as outpatient.

## 2024-09-08 NOTE — PROGRESS NOTE ADULT - PROBLEM SELECTOR PROBLEM 4
Chronic kidney disease, unspecified CKD stage
Chronic kidney disease, unspecified CKD stage
Inappropriate shocks from ICD (implantable cardioverter-defibrillator)
Chronic kidney disease, unspecified CKD stage
Inappropriate shocks from ICD (implantable cardioverter-defibrillator)
Chronic kidney disease, unspecified CKD stage
Inappropriate shocks from ICD (implantable cardioverter-defibrillator)
Chronic kidney disease, unspecified CKD stage
Chronic kidney disease, unspecified CKD stage
Inappropriate shocks from ICD (implantable cardioverter-defibrillator)
Chronic kidney disease, unspecified CKD stage
Inappropriate shocks from ICD (implantable cardioverter-defibrillator)
Chronic kidney disease, unspecified CKD stage
Inappropriate shocks from ICD (implantable cardioverter-defibrillator)
Chronic kidney disease, unspecified CKD stage

## 2024-09-08 NOTE — PROGRESS NOTE ADULT - PROBLEM SELECTOR PLAN 2
Endo following
- continue to monitor renal function.
-lantus 40 + 20u tidw/m at home   -start lantus 40 qhs + 15u+SS while inpatient
Endo following
- continue to monitor renal function.
- continue to monitor renal function.
Endo following
- continue to monitor renal function.
Endo following
- continue to monitor renal function.
Endo following
Endo following
- continue to monitor renal function.
Endo following

## 2024-09-08 NOTE — PROGRESS NOTE ADULT - ASSESSMENT
63 year old Male with PMHx HFrEF s/p ICD, HTN, T2DM, s/p LLE amputation in [place] ~2 months ago. Pt presented to ED for ICD shock.        1- CKDIII  2- CHF  3- DM2  4- anemia  5- cellulitis   6- rash         servando in setting of overdiuresis leading to hypotension  creatinine returned to baseline  cont bumex 2mg po bid  resume entresto   + gamma migrating protein present. to see heme inpt vs outpt   strict I/O  dc planning from renal standpoint

## 2024-09-08 NOTE — PROGRESS NOTE ADULT - PROBLEM SELECTOR PROBLEM 3
CAD (coronary artery disease)
CAD (coronary artery disease)
Acute on chronic systolic congestive heart failure
CAD (coronary artery disease)
CAD (coronary artery disease)
Acute on chronic systolic congestive heart failure
CAD (coronary artery disease)
Acute on chronic systolic congestive heart failure
CAD (coronary artery disease)
Acute on chronic systolic congestive heart failure
CAD (coronary artery disease)
Acute on chronic systolic congestive heart failure
CAD (coronary artery disease)
Acute on chronic systolic congestive heart failure
CAD (coronary artery disease)

## 2024-09-08 NOTE — PROGRESS NOTE ADULT - SUBJECTIVE AND OBJECTIVE BOX
[facility]   [phone]  RENAL FOLLOW UP NOTE  --------------------------------------------------------------------------------  Chief Complaint:    24 hour events/subjective:    seen earlier   states breathing is better     PAST HISTORY  --------------------------------------------------------------------------------  No significant changes to PMH, PSH, FHx, SHx, unless otherwise noted    ALLERGIES & MEDICATIONS  --------------------------------------------------------------------------------  Allergies    ceftriaxone (Rash)  doxepin (Other)  Zosyn (Pruritus)  vancomycin (Rash (Mild to Mod))    Intolerances      Standing Inpatient Medications  ammonium lactate 12% Lotion 1 Application(s) Topical every 12 hours  aspirin enteric coated 81 milliGRAM(s) Oral daily  buMETAnide 2 milliGRAM(s) Oral two times a day  clopidogrel Tablet 75 milliGRAM(s) Oral daily  dextrose 5%. 1000 milliLiter(s) IV Continuous <Continuous>  dextrose 5%. 1000 milliLiter(s) IV Continuous <Continuous>  dextrose 50% Injectable 12.5 Gram(s) IV Push once  dextrose 50% Injectable 25 Gram(s) IV Push once  hydrocortisone 1% Cream 1 Application(s) Topical two times a day  insulin glargine Injectable (LANTUS) 30 Unit(s) SubCutaneous at bedtime  insulin lispro (ADMELOG) corrective regimen sliding scale   SubCutaneous at bedtime  insulin lispro (ADMELOG) corrective regimen sliding scale   SubCutaneous three times a day before meals  insulin lispro Injectable (ADMELOG) 10 Unit(s) SubCutaneous three times a day before meals  pantoprazole    Tablet 40 milliGRAM(s) Oral before breakfast  rosuvastatin 20 milliGRAM(s) Oral at bedtime  sacubitril 24 mG/valsartan 26 mG 1 Tablet(s) Oral two times a day  sodium chloride 0.65% Nasal 2 Spray(s) Both Nostrils four times a day  tamsulosin 0.4 milliGRAM(s) Oral at bedtime    PRN Inpatient Medications  acetaminophen     Tablet .. 650 milliGRAM(s) Oral every 6 hours PRN  aluminum hydroxide/magnesium hydroxide/simethicone Suspension 30 milliLiter(s) Oral every 4 hours PRN  dextrose Oral Gel 15 Gram(s) Oral once PRN  diphenhydrAMINE 25 milliGRAM(s) Oral every 4 hours PRN  melatonin 3 milliGRAM(s) Oral at bedtime PRN  oxyCODONE    IR 5 milliGRAM(s) Oral every 4 hours PRN  oxyCODONE    IR 10 milliGRAM(s) Oral every 6 hours PRN      REVIEW OF SYSTEMS  --------------------------------------------------------------------------------    Gen: denies  fevers/chills,  CVS: denies chest pain/palpitations  Resp: denies SOB/Cough  GI: Denies N/V/Abd pain  : Denies dysuria    VITALS/PHYSICAL EXAM  --------------------------------------------------------------------------------  T(C): 36.9 (09-08-24 @ 20:40), Max: 36.9 (09-08-24 @ 04:00)  HR: 95 (09-08-24 @ 20:40) (95 - 99)  BP: 100/66 (09-08-24 @ 20:40) (100/66 - 106/68)  RR: 18 (09-08-24 @ 20:40) (18 - 18)  SpO2: 95% (09-08-24 @ 20:40) (94% - 98%)  Wt(kg): --        09-07-24 @ 07:01  -  09-08-24 @ 07:00  --------------------------------------------------------  IN: 480 mL / OUT: 6750 mL / NET: -6270 mL    09-08-24 @ 07:01  -  09-08-24 @ 22:02  --------------------------------------------------------  IN: 240 mL / OUT: 2000 mL / NET: -1760 mL      Physical Exam:  	    Gen: Non toxic comfortable appearing   	Pulm: decrease bs  no rales or ronchi or wheezing  	CV: +JVD, RRR, S1S2; no rub  	Abd: +BS, soft, nontender/+distended, +abd wall edema, improved  	UE: Warm, no cyanosis  no clubbing,  no edema;  	LE: Warm, no cyanosis  no clubbing, RLE chronic venous stasis, edema decreased, LLE BKA   	Neuro: alert and oriented. speech coherent     LABS/STUDIES  --------------------------------------------------------------------------------              9.8    5.95  >-----------<  200      [09-07-24 @ 07:23]              35.1     139  |  97  |  58  ----------------------------<  189      [09-08-24 @ 06:38]  3.6   |  27  |  1.81        Ca     9.4     [09-08-24 @ 06:38]      Mg     2.3     [09-08-24 @ 06:38]            Creatinine Trend:  SCr 1.81 [09-08 @ 06:38]  SCr 1.95 [09-07 @ 07:15]  SCr 2.07 [09-06 @ 05:17]  SCr 2.63 [09-05 @ 05:49]  SCr 2.34 [09-04 @ 06:26]        Iron 24, TIBC 395, %sat 6      [08-29-24 @ 06:04]  Ferritin 51      [08-29-24 @ 07:31]  HbA1c 8.9      [12-16-19 @ 08:15]    Free Light Chains: kappa 9.40, lambda 8.09, ratio = 1.16      [08-30 @ 06:49]  Immunofixation Serum:   Weak IgG Lambda Band Identified      Reference Range: None Detected      [08-30-24 @ 06:49]  SPEP Interpretation: Weak Gamma-Migrating Paraprotein Identified      [08-30-24 @ 06:49]

## 2024-09-08 NOTE — PROGRESS NOTE ADULT - SUBJECTIVE AND OBJECTIVE BOX
Patient is a 63y old  Male who presents with a chief complaint of AICD firing (01 Sep 2024 11:21)      SUBJECTIVE / OVERNIGHT EVENTS:    Events noted.    Patient anxious to go home   CONSTITUTIONAL: S/p RRT  RESPIRATORY: No cough, wheezing,  No shortness of breath  CARDIOVASCULAR: No chest pain, palpitations,   GASTROINTESTINAL: No abdominal or epigastric pain.   NEUROLOGICAL: No headache      T(C): 36.7 (09-09-24 @ 04:00), Max: 36.7 (09-09-24 @ 04:00)  HR: 97 (09-09-24 @ 04:00) (97 - 97)  BP: 109/70 (09-09-24 @ 04:00) (109/70 - 109/70)  RR: 18 (09-09-24 @ 04:00) (18 - 18)  SpO2: 94% (09-09-24 @ 04:00) (94% - 94%)    MEDICATIONS  (STANDING):  ammonium lactate 12% Lotion 1 Application(s) Topical every 12 hours  aspirin enteric coated 81 milliGRAM(s) Oral daily  buMETAnide 2 milliGRAM(s) Oral two times a day  clopidogrel Tablet 75 milliGRAM(s) Oral daily  dextrose 5%. 1000 milliLiter(s) (100 mL/Hr) IV Continuous <Continuous>  dextrose 5%. 1000 milliLiter(s) (50 mL/Hr) IV Continuous <Continuous>  dextrose 50% Injectable 25 Gram(s) IV Push once  dextrose 50% Injectable 12.5 Gram(s) IV Push once  hydrocortisone 1% Cream 1 Application(s) Topical two times a day  insulin glargine Injectable (LANTUS) 30 Unit(s) SubCutaneous at bedtime  insulin lispro (ADMELOG) corrective regimen sliding scale   SubCutaneous at bedtime  insulin lispro (ADMELOG) corrective regimen sliding scale   SubCutaneous three times a day before meals  insulin lispro Injectable (ADMELOG) 10 Unit(s) SubCutaneous three times a day before meals  pantoprazole    Tablet 40 milliGRAM(s) Oral before breakfast  rosuvastatin 20 milliGRAM(s) Oral at bedtime  sacubitril 24 mG/valsartan 26 mG 1 Tablet(s) Oral two times a day  sodium chloride 0.65% Nasal 2 Spray(s) Both Nostrils four times a day  tamsulosin 0.4 milliGRAM(s) Oral at bedtime    MEDICATIONS  (PRN):  acetaminophen     Tablet .. 650 milliGRAM(s) Oral every 6 hours PRN Temp greater or equal to 38C (100.4F), Mild Pain (1 - 3)  aluminum hydroxide/magnesium hydroxide/simethicone Suspension 30 milliLiter(s) Oral every 4 hours PRN Dyspepsia  dextrose Oral Gel 15 Gram(s) Oral once PRN Blood Glucose LESS THAN 70 milliGRAM(s)/deciliter  diphenhydrAMINE 25 milliGRAM(s) Oral every 4 hours PRN Rash and/or Itching  melatonin 3 milliGRAM(s) Oral at bedtime PRN Insomnia  oxyCODONE    IR 5 milliGRAM(s) Oral every 4 hours PRN Moderate Pain (4 - 6)  oxyCODONE    IR 10 milliGRAM(s) Oral every 6 hours PRN Severe Pain (7 - 10)        NECK: Supple, No JVD  CHEST/LUNG: Clear to auscultation bilaterally; No wheezing.  HEART: Regular rate and rhythm; No murmurs, rubs, or gallops  ABDOMEN: Soft, Nontender, Nondistended; Bowel sounds present  EXTREMITIES: LEFT BKA     No edema  NEUROLOGY: AAO                            139|98|52<196  3.4|26|1.76  9.3,2.2,--  09-09 @ 04:32

## 2024-09-08 NOTE — PROGRESS NOTE ADULT - SUBJECTIVE AND OBJECTIVE BOX
DATE OF SERVICE: 09-08-24 @ 14:20    Patient is a 63y old  Male who presents with a chief complaint of AICD firing (01 Sep 2024 11:21)      INTERVAL HISTORY: no complaints     REVIEW OF SYSTEMS:  CONSTITUTIONAL: No weakness  EYES/ENT: No visual changes;  No throat pain   NECK: No pain or stiffness  RESPIRATORY: No cough, wheezing; No shortness of breath  CARDIOVASCULAR: No chest pain or palpitations  GASTROINTESTINAL: No abdominal  pain. No nausea, vomiting, or hematemesis  GENITOURINARY: No dysuria, frequency or hematuria  NEUROLOGICAL: No stroke like symptoms  SKIN: No rashes      	  MEDICATIONS:  buMETAnide 2 milliGRAM(s) Oral two times a day  sacubitril 24 mG/valsartan 26 mG 1 Tablet(s) Oral two times a day        PHYSICAL EXAM:  T(C): 36.8 (09-08-24 @ 11:20), Max: 37 (09-07-24 @ 20:30)  HR: 96 (09-08-24 @ 11:20) (96 - 102)  BP: 103/63 (09-08-24 @ 12:55) (101/56 - 106/70)  RR: 18 (09-08-24 @ 11:20) (18 - 18)  SpO2: 98% (09-08-24 @ 11:20) (94% - 98%)  Wt(kg): --  I&O's Summary    07 Sep 2024 07:01  -  08 Sep 2024 07:00  --------------------------------------------------------  IN: 480 mL / OUT: 6750 mL / NET: -6270 mL    08 Sep 2024 07:01  -  08 Sep 2024 14:20  --------------------------------------------------------  IN: 240 mL / OUT: 2000 mL / NET: -1760 mL          Appearance: In no distress	  HEENT:    PERRL, EOMI	  Cardiovascular:  S1 S2, No JVD  Respiratory: Lungs clear to auscultation	  Gastrointestinal:  Soft, Non-tender, + BS	  Vascularature:  No edema of LE  Psychiatric: Appropriate affect   Neuro: no acute focal deficits                               9.8    5.95  )-----------( 200      ( 07 Sep 2024 07:23 )             35.1     09-08    139  |  97  |  58<H>  ----------------------------<  189<H>  3.6   |  27  |  1.81<H>    Ca    9.4      08 Sep 2024 06:38  Mg     2.3     09-08          Labs personally reviewed      ASSESSMENT/PLAN: 	      Problem/Plan - 1:  ·  Problem: Acute decompensated systolic heart failure.   ·  Plan:    - TTE 8/26 similar to prior wiith EF 20%  - Will slowly resume sHF GDMT as BP tolerates  --- Toprol 25mg daily  --- Entresto 24/26mg BID  --- Aldactone 25mg  --- Jardiance 10mg daily  --- Home dose Bumex 4mg PO daily  - Appreciate HF recommendations; Possible CardioMems this admission ? pt prefers to do as outpt  - As per wife, dry weight ~247lbs  - 9/4 Cr stable/BUN uptrending a/w RRT for hypotension. All HF meds and diuretics on hold. S/p IVF bolus and Albumin with improvement in BP and lactate.  - 9/5 with improvement in BP. Plan to slowly resume HF meds and monitor BP and BUN/Cr.  - TTE with trace pericardial effusion with no evidence of tamponade. No further interval changes.      Problem/Plan - 2:  ·  Problem: CAD (coronary artery disease).   ·  Plan: c/w asa and Plavix    Problem/Plan - 3:  ·  Problem: Chronic kidney disease, unspecified CKD stage.   ·  Plan: Monitor BMP daily, dose meds per renal fx, avoid nephrotoxins.    Problem/Plan - 4:  ·  Problem: ICD shock   ·  Plan: Inappropriate as per EP  - settings adjusted  - s/p ICD extraction and re-implant, (9/3)  - Current ICD disabled--> pacing pads on patient    Problem/Plan - 5:  ·  Problem: Prophylactic measure.   ·  Plan: VTE ppx: hep sq         KRISHNA Apodaca, DO [facility]  Cardiovascular Medicine  [address] [place] [address], [address]  Office: [phone]  Available via call/text on [employer] Teams  DATE OF SERVICE: 09-08-24 @ 14:20    Patient is a 63y old  Male who presents with a chief complaint of AICD firing (01 Sep 2024 11:21)      INTERVAL HISTORY: no complaints     REVIEW OF SYSTEMS:  CONSTITUTIONAL: No weakness  EYES/ENT: No visual changes;  No throat pain   NECK: No pain or stiffness  RESPIRATORY: No cough, wheezing; No shortness of breath  CARDIOVASCULAR: No chest pain or palpitations  GASTROINTESTINAL: No abdominal  pain. No nausea, vomiting, or hematemesis  GENITOURINARY: No dysuria, frequency or hematuria  NEUROLOGICAL: No stroke like symptoms  SKIN: No rashes      	  MEDICATIONS:  buMETAnide 2 milliGRAM(s) Oral two times a day  sacubitril 24 mG/valsartan 26 mG 1 Tablet(s) Oral two times a day        PHYSICAL EXAM:  T(C): 36.8 (09-08-24 @ 11:20), Max: 37 (09-07-24 @ 20:30)  HR: 96 (09-08-24 @ 11:20) (96 - 102)  BP: 103/63 (09-08-24 @ 12:55) (101/56 - 106/70)  RR: 18 (09-08-24 @ 11:20) (18 - 18)  SpO2: 98% (09-08-24 @ 11:20) (94% - 98%)  Wt(kg): --  I&O's Summary    07 Sep 2024 07:01  -  08 Sep 2024 07:00  --------------------------------------------------------  IN: 480 mL / OUT: 6750 mL / NET: -6270 mL    08 Sep 2024 07:01  -  08 Sep 2024 14:20  --------------------------------------------------------  IN: 240 mL / OUT: 2000 mL / NET: -1760 mL          Appearance: In no distress	  HEENT:    PERRL, EOMI	  Cardiovascular:  S1 S2, No JVD  Respiratory: Lungs clear to auscultation	  Gastrointestinal:  Soft, Non-tender, + BS	  Vascularature:  No edema of LE  Psychiatric: Appropriate affect   Neuro: no acute focal deficits                               9.8    5.95  )-----------( 200      ( 07 Sep 2024 07:23 )             35.1     09-08    139  |  97  |  58<H>  ----------------------------<  189<H>  3.6   |  27  |  1.81<H>    Ca    9.4      08 Sep 2024 06:38  Mg     2.3     09-08          Labs personally reviewed      ASSESSMENT/PLAN: 	      Problem/Plan - 1:  ·  Problem: Acute decompensated systolic heart failure.   ·  Plan:    - TTE 8/26 similar to prior wiith EF 20%  - Will slowly resume sHF GDMT as BP tolerates  --- Toprol 25mg daily - resume 9/9 if BP tolerates  --- Entresto 24/26mg BID resumed  --- Aldactone 25mg  --- Jardiance 10mg daily  --- Home dose Bumex 4mg PO daily  - Appreciate HF recommendations; Possible CardioMems this admission ? pt prefers to do as outpt  - As per wife, dry weight ~247lbs  - TTE with trace pericardial effusion with no evidence of tamponade. No further interval changes.      Problem/Plan - 2:  ·  Problem: CAD (coronary artery disease).   ·  Plan: c/w asa and Plavix    Problem/Plan - 3:  ·  Problem: Chronic kidney disease, unspecified CKD stage.   ·  Plan: Monitor BMP daily, dose meds per renal fx, avoid nephrotoxins.    Problem/Plan - 4:  ·  Problem: ICD shock   ·  Plan: Inappropriate as per EP  - settings adjusted  - s/p ICD extraction and re-implant, (9/3)  - Current ICD disabled--> pacing pads on patient    Problem/Plan - 5:  ·  Problem: Prophylactic measure.   ·  Plan: VTE ppx: hep sq         KRISHNA Apodaca DO [facility]  Cardiovascular Medicine  [address], [address]  Office: [phone]  Available via call/text on [employer] Teams

## 2024-09-08 NOTE — DISCHARGE NOTE PROVIDER - NSRESEARCHGRANT_MLMHIDDEN_GEN_A_CORE
Please call:  I placed orders for thyroid, lipids and metabolic panel.  She could have these drawn prior to her visit, otherwise we can have them drawn at her visit (does require fasting).   yes

## 2024-09-08 NOTE — DISCHARGE NOTE PROVIDER - PROVIDER TOKENS
PROVIDER:[[id number]],FOLLOWUP:[1 week],ESTABLISHEDPATIENT:[T]],PROVIDER:[TOKEN:[[id number] PROVIDER:[TOKEN:[[id number]],FOLLOWUP:[1 week],ESTABLISHEDPATIENT:[T]],PROVIDER:[TOKEN:[[id number]],FOLLOWUP:[2 weeks],ESTABLISHEDPATIENT:[T]]

## 2024-09-08 NOTE — DISCHARGE NOTE PROVIDER - CARE PROVIDER_API CALL
Garrison Boykin  Internal Medicine  [address], [address]  [place] [address]  Phone: [phone]  Fax: [phone]  Established Patient  Follow Up Time: 1 week    Shawn Barnes  Cardiovascular Disease  [address], [address]  [place] [address]  Phone: [phone]  Fax: [phone]  Follow Up Time:    Garrison Boykin  Internal Medicine  [address] [facility], [address]  [place] [address]  Phone: [phone]  Fax: [phone]  Established Patient  Follow Up Time: 1 week    Billy Camara  Cardiovascular Disease  [address]  [place] [address]  Phone: [phone]  Fax: [phone]  Established Patient  Follow Up Time: 2 weeks

## 2024-09-08 NOTE — PROGRESS NOTE ADULT - PROBLEM SELECTOR PROBLEM 2
Chronic kidney disease, unspecified CKD stage
Type 2 diabetes mellitus
Chronic kidney disease, unspecified CKD stage
Type 2 diabetes mellitus
Chronic kidney disease, unspecified CKD stage
Type 2 diabetes mellitus
Chronic kidney disease, unspecified CKD stage
Type 2 diabetes mellitus

## 2024-09-08 NOTE — DISCHARGE NOTE PROVIDER - CARE PROVIDERS DIRECT ADDRESSES
[email or web address]

## 2024-09-08 NOTE — DISCHARGE NOTE PROVIDER - HOSPITAL COURSE
HPI:  63M PMHx HFrEF s/p ICD, HTN, T2DM, s/p LLE amputation   Pt presented w/ ICD shock 30min prior to arrival to ED. Pt reports he's been having progressively worsening GREWAL, orthopnea and SOB for the past few days.     In the ED, BP on the softer side, otherwise VSS. OK on RA.   CBC w/ wbc 7.2, hgb 9.3, plt 147.   CMP w/ SCr 1.66, LFT unremarkable. Trop 77, 74. proBNP 3255.   CXR w/ RLL opacity, cannot r/o infection.     ICD interrogated by EP in the ED, ICD oversensing and shock was inappropriate.   Pt also found w/ new RBBB.   ED rec c/w beta blockers.   Pt received full dose asa and bumex in the ED for fluid overload.   Admit for ADHF.  (25 Aug 2024 00:08)    Hospital Course:  63M admitted for ADHF, also inappropriate ICD shock. EP team was consulted. S/p S-ICD explant and EV-ICD reimplant on 9/3. Heart failure team was consulted. Pt initially on bumex gtt, however stopped 9/4 due to hypotension and likely overdiuresis. Pt had RRT 9/4 for hypotension, bumex gtt dc'ed, held entresto, spirinolactone, and toprol. S/p albumin 250 cc x 1 and 250 cc NS IVF bolus x 2. BP improved and remained stable, po bumex continued. TTE w/ trace L pericardial effusion no tamponade, IR consulted and effusion is too small for drain placement. S/p Iron sucrose x 5 days for iron deficiency in decompensated heart failure. Plan for outpt cardiomems with HF.     Pending HF clearance ______________    Important Medication Changes and Reason:    Active or Pending Issues Requiring Follow-up:  PCP, EP, cards  Advanced Directives:   [ ] Full code  [ ] DNR  [ ] Hospice    Discharge Diagnoses:  AoC HF  inappropriate shocks from ICD       HPI:  63M PMHx HFrEF s/p ICD, HTN, T2DM, s/p LLE amputation   Pt presented w/ ICD shock 30min prior to arrival to ED. Pt reports he's been having progressively worsening GERWAL, orthopnea and SOB for the past few days.     In the ED, BP on the softer side, otherwise VSS. OK on RA.   CBC w/ wbc 7.2, hgb 9.3, plt 147.   CMP w/ SCr 1.66, LFT unremarkable. Trop 77, 74. proBNP 3255.   CXR w/ RLL opacity, cannot r/o infection.     ICD interrogated by EP in the ED, ICD oversensing and shock was inappropriate.   Pt also found w/ new RBBB.   ED rec c/w beta blockers.   Pt received full dose asa and bumex in the ED for fluid overload.   Admit for ADHF.  (25 Aug 2024 00:08)    Hospital Course:  63M admitted for ADHF, also inappropriate ICD shock. EP team was consulted. S/p S-ICD explant and EV-ICD reimplant on 9/3. Heart failure team was consulted. Pt initially on bumex gtt, however stopped 9/4 due to hypotension and likely overdiuresis. Pt had RRT 9/4 for hypotension, bumex gtt dc'ed, held entresto, spirinolactone, and toprol. S/p albumin 250 cc x 1 and 250 cc NS IVF bolus x 2. BP improved and remained stable, po bumex continued. TTE w/ trace L pericardial effusion no tamponade, IR consulted and effusion is too small for drain placement. S/p Iron sucrose x 5 days for iron deficiency in decompensated heart failure. Plan for outpt cardiomems with HF. HF team signed off and stable to d/c from their standpoint. Cardiology cleared to d/c as well. Patient is medically stable to be discharged per attending Dr. Sow.     Important Medication Changes and Reason:    Active or Pending Issues Requiring Follow-up:  PCP, EP, cards  Advanced Directives:   [ ] Full code  [ ] DNR  [ ] Hospice    Discharge Diagnoses:  AoC HF  inappropriate shocks from ICD

## 2024-09-09 ENCOUNTER — TRANSCRIPTION ENCOUNTER (OUTPATIENT)
Age: 64
End: 2024-09-09

## 2024-09-09 VITALS — HEART RATE: 83 BPM | DIASTOLIC BLOOD PRESSURE: 65 MMHG | SYSTOLIC BLOOD PRESSURE: 104 MMHG

## 2024-09-09 LAB
ANION GAP SERPL CALC-SCNC: 15 MMOL/L — SIGNIFICANT CHANGE UP (ref 5–17)
BUN SERPL-MCNC: 52 MG/DL — HIGH (ref 7–23)
CALCIUM SERPL-MCNC: 9.3 MG/DL — SIGNIFICANT CHANGE UP (ref 8.4–10.5)
CHLORIDE SERPL-SCNC: 98 MMOL/L — SIGNIFICANT CHANGE UP (ref 96–108)
CO2 SERPL-SCNC: 26 MMOL/L — SIGNIFICANT CHANGE UP (ref 22–31)
CREAT SERPL-MCNC: 1.76 MG/DL — HIGH (ref 0.5–1.3)
EGFR: 43 ML/MIN/1.73M2 — LOW
GLUCOSE BLDC GLUCOMTR-MCNC: 177 MG/DL — HIGH (ref 70–99)
GLUCOSE BLDC GLUCOMTR-MCNC: 222 MG/DL — HIGH (ref 70–99)
GLUCOSE SERPL-MCNC: 196 MG/DL — HIGH (ref 70–99)
MAGNESIUM SERPL-MCNC: 2.2 MG/DL — SIGNIFICANT CHANGE UP (ref 1.6–2.6)
POTASSIUM SERPL-MCNC: 3.4 MMOL/L — LOW (ref 3.5–5.3)
POTASSIUM SERPL-SCNC: 3.4 MMOL/L — LOW (ref 3.5–5.3)
SODIUM SERPL-SCNC: 139 MMOL/L — SIGNIFICANT CHANGE UP (ref 135–145)

## 2024-09-09 RX ORDER — METOPROLOL TARTRATE 100 MG/1
25 TABLET ORAL DAILY
Refills: 0 | Status: DISCONTINUED | OUTPATIENT
Start: 2024-09-09 | End: 2024-09-09

## 2024-09-09 RX ORDER — POTASSIUM CHLORIDE 10 MEQ
40 TABLET, EXT RELEASE, PARTICLES/CRYSTALS ORAL ONCE
Refills: 0 | Status: COMPLETED | OUTPATIENT
Start: 2024-09-09 | End: 2024-09-09

## 2024-09-09 RX ADMIN — Medication 81 MILLIGRAM(S): at 12:10

## 2024-09-09 RX ADMIN — BUMETANIDE 2 MILLIGRAM(S): 2 TABLET ORAL at 13:46

## 2024-09-09 RX ADMIN — Medication 75 MILLIGRAM(S): at 12:11

## 2024-09-09 RX ADMIN — BUMETANIDE 2 MILLIGRAM(S): 2 TABLET ORAL at 05:49

## 2024-09-09 RX ADMIN — METOPROLOL TARTRATE 25 MILLIGRAM(S): 100 TABLET ORAL at 13:46

## 2024-09-09 RX ADMIN — Medication 40 MILLIGRAM(S): at 05:49

## 2024-09-09 RX ADMIN — Medication 10 UNIT(S): at 07:48

## 2024-09-09 RX ADMIN — Medication 1: at 07:47

## 2024-09-09 RX ADMIN — Medication 40 MILLIEQUIVALENT(S): at 05:49

## 2024-09-09 RX ADMIN — Medication 10 UNIT(S): at 12:10

## 2024-09-09 RX ADMIN — Medication 2: at 12:09

## 2024-09-09 RX ADMIN — SACUBITRIL AND VALSARTAN 1 TABLET(S): 49; 51 TABLET, FILM COATED ORAL at 05:50

## 2024-09-09 NOTE — PROGRESS NOTE ADULT - PROVIDER SPECIALTY LIST ADULT
Cardiology
Electrophysiology
Electrophysiology
Heme/Onc
Hospitalist
Nephrology
Cardiology
Electrophysiology
Heme/Onc
Heme/Onc
Internal Medicine
Nephrology
Podiatry
Wound Care
Electrophysiology
Electrophysiology
Heme/Onc
Internal Medicine
Nephrology
Heart Failure
Heart Failure
Hospitalist
Internal Medicine
ENT
Heart Failure
Internal Medicine
Internal Medicine
Heart Failure
Internal Medicine
Hospitalist
Hospitalist
Heart Failure
Hospitalist
Internal Medicine
Heart Failure
Hospitalist
Hospitalist

## 2024-09-09 NOTE — PROVIDER CONTACT NOTE (OTHER) - RECOMMENDATIONS
Notify GABI Garay
Notify NP Mary Christianson
Notify provider.
PA made aware.
NP made aware.
Notify GABI Garay
Kcl 20 meq po x 1
Potassium 20 meq x 1

## 2024-09-09 NOTE — PROVIDER CONTACT NOTE (OTHER) - ASSESSMENT
Patient asymptomatic. No c/o chest pains, SOB, palpitations.
Pt A&Ox4, able to make needs known. Pt asymptomatic. Pt hypotensive, other vitals stable. No s/s of bleeding. Pt denies cp, denies SOB, denies pain.
Tele Event - 6 bts WCT up to 170, No c/o chest pains, SOB, palpitations  VSS
Pt was asleep at time of the episode, asymptomatic when awaken.
No acute changes in pt status, pt remains asymptomatic.
Pt A&Ox4, able to make needs known. Pt asymptomatic. VSS. No s/s of bleeding. Pt denies cp, denies SOB, denies pain.
Patient remains aox4;vss. Patient resting comfortably at the time of event. Patient asymptomatic.
Pt A&Ox4, able to make needs known. Pt c/o generalized itchy feeling, requesting Benadryl. Pt hypotensive. No s/s of bleeding. Pt denies cp, denies SOB, denies pain.

## 2024-09-09 NOTE — PROGRESS NOTE ADULT - SUBJECTIVE AND OBJECTIVE BOX
[facility]   [phone]  RENAL FOLLOW UP NOTE  --------------------------------------------------------------------------------  Chief Complaint:    24 hour events/subjective:    seen earlier  states breathing is better     PAST HISTORY  --------------------------------------------------------------------------------  No significant changes to PMH, PSH, FHx, SHx, unless otherwise noted    ALLERGIES & MEDICATIONS  --------------------------------------------------------------------------------  Allergies    ceftriaxone (Rash)  doxepin (Other)  Zosyn (Pruritus)  vancomycin (Rash (Mild to Mod))    Intolerances      Standing Inpatient Medications  ammonium lactate 12% Lotion 1 Application(s) Topical every 12 hours  aspirin enteric coated 81 milliGRAM(s) Oral daily  buMETAnide 2 milliGRAM(s) Oral two times a day  clopidogrel Tablet 75 milliGRAM(s) Oral daily  dextrose 5%. 1000 milliLiter(s) IV Continuous <Continuous>  dextrose 5%. 1000 milliLiter(s) IV Continuous <Continuous>  dextrose 50% Injectable 12.5 Gram(s) IV Push once  dextrose 50% Injectable 25 Gram(s) IV Push once  hydrocortisone 1% Cream 1 Application(s) Topical two times a day  insulin glargine Injectable (LANTUS) 30 Unit(s) SubCutaneous at bedtime  insulin lispro (ADMELOG) corrective regimen sliding scale   SubCutaneous at bedtime  insulin lispro (ADMELOG) corrective regimen sliding scale   SubCutaneous three times a day before meals  insulin lispro Injectable (ADMELOG) 10 Unit(s) SubCutaneous three times a day before meals  metoprolol succinate ER 25 milliGRAM(s) Oral daily  pantoprazole    Tablet 40 milliGRAM(s) Oral before breakfast  rosuvastatin 20 milliGRAM(s) Oral at bedtime  sacubitril 24 mG/valsartan 26 mG 1 Tablet(s) Oral two times a day  sodium chloride 0.65% Nasal 2 Spray(s) Both Nostrils four times a day  tamsulosin 0.4 milliGRAM(s) Oral at bedtime    PRN Inpatient Medications  acetaminophen     Tablet .. 650 milliGRAM(s) Oral every 6 hours PRN  aluminum hydroxide/magnesium hydroxide/simethicone Suspension 30 milliLiter(s) Oral every 4 hours PRN  dextrose Oral Gel 15 Gram(s) Oral once PRN  diphenhydrAMINE 25 milliGRAM(s) Oral every 4 hours PRN  melatonin 3 milliGRAM(s) Oral at bedtime PRN  oxyCODONE    IR 5 milliGRAM(s) Oral every 4 hours PRN  oxyCODONE    IR 10 milliGRAM(s) Oral every 6 hours PRN      REVIEW OF SYSTEMS  --------------------------------------------------------------------------------    Gen: denies  fevers/chills,  CVS: denies chest pain/palpitations  Resp: denies SOB/Cough  GI: Denies N/V/Abd pain  : Denies dysuria    VITALS/PHYSICAL EXAM  --------------------------------------------------------------------------------  T(C): 36.8 (09-09-24 @ 11:24), Max: 36.8 (09-09-24 @ 11:24)  HR: 83 (09-09-24 @ 13:30) (83 - 97)  BP: 104/65 (09-09-24 @ 13:30) (97/68 - 109/70)  RR: 18 (09-09-24 @ 11:24) (18 - 18)  SpO2: 97% (09-09-24 @ 11:24) (94% - 97%)  Wt(kg): --        09-08-24 @ 07:01  -  09-09-24 @ 07:00  --------------------------------------------------------  IN: 480 mL / OUT: 5200 mL / NET: -4720 mL    09-09-24 @ 07:01  -  09-09-24 @ 22:23  --------------------------------------------------------  IN: 200 mL / OUT: 1250 mL / NET: -1050 mL      Physical Exam:  	    Gen: Non toxic comfortable appearing   	Pulm: decrease bs  no rales or ronchi or wheezing  	CV: +JVD, RRR, S1S2; no rub  	Abd: +BS, soft, nontender/+distended, +abd wall edema, improved  	UE: Warm, no cyanosis  no clubbing,  no edema;  	LE: Warm, no cyanosis  no clubbing, RLE chronic venous stasis, dressing , edema decreased, LLE BKA   	Neuro: alert and oriented. speech coherent       LABS/STUDIES  --------------------------------------------------------------------------------    139  |  98  |  52  ----------------------------<  196      [09-09-24 @ 04:32]  3.4   |  26  |  1.76        Ca     9.3     [09-09-24 @ 04:32]      Mg     2.2     [09-09-24 @ 04:32]            Creatinine Trend:  SCr 1.76 [09-09 @ 04:32]  SCr 1.81 [09-08 @ 06:38]  SCr 1.95 [09-07 @ 07:15]  SCr 2.07 [09-06 @ 05:17]  SCr 2.63 [09-05 @ 05:49]        Iron 24, TIBC 395, %sat 6      [08-29-24 @ 06:04]  Ferritin 51      [08-29-24 @ 07:31]  HbA1c 8.9      [12-16-19 @ 08:15]    Free Light Chains: kappa 9.40, lambda 8.09, ratio = 1.16      [08-30 @ 06:49]  Immunofixation Serum:   Weak IgG Lambda Band Identified      Reference Range: None Detected      [08-30-24 @ 06:49]  SPEP Interpretation: Weak Gamma-Migrating Paraprotein Identified      [08-30-24 @ 06:49]

## 2024-09-09 NOTE — PROVIDER CONTACT NOTE (OTHER) - NAME OF MD/NP/PA/DO NOTIFIED:
JOE Garay
Radha DIAZ
BLAIR Bassett
JOE Yoder
BLAIR Christianson
GABI Garay
GABI Garay
Mable Merrill ACP

## 2024-09-09 NOTE — PROVIDER CONTACT NOTE (OTHER) - ACTION/TREATMENT ORDERED:
Medication to be given as ordered. Purposeful rounding remains ongoing.
ACP Joseline Garay notified. Benadryl ordered. Plan of care ongoing.
Medication given as ordered. Purposeful rounding remains ongoing.
NP Mary Christianson notified. K+ supplement ordered.
NP made aware.
Provider made aware. As per provider will obtain morning labs early to evaluate potassium.
ACP Joseline Garay notified. Ok to hold BP meds that were held this AM; hydralazine, spironolactone, bumex gtt. Plan of care ongoing.
PA made aware, Mg added to AM lab.

## 2024-09-09 NOTE — PROVIDER CONTACT NOTE (OTHER) - REASON
Pt hypotensive, asymptomatic. Due for BP meds
6 beats WCT at 11:06
Pt c/o generalized itchy feeling, requesting Benadryl
7 beats WCT
Patient had 11 beats WCT
Tele Event
Tele Event
? WCTs on tele

## 2024-09-09 NOTE — DISCHARGE NOTE NURSING/CASE MANAGEMENT/SOCIAL WORK - NSDCVIVACCINE_GEN_ALL_CORE_FT
influenza, injectable, quadrivalent, preservative free; 08-Feb-2018 11:10; Chely Patterson (RN); [employer]; [id number]; IntraMuscular; Deltoid Left.; 0.5 milliLiter(s); VIS (VIS Published: 07-Aug-2015, VIS Presented: 08-Feb-2018);   influenza, injectable, quadrivalent, preservative free; 21-Sep-2020 18:51; Yan Cazares (RN); [employer]; [id number] (Exp. Date: 30-Jun-2021); IntraMuscular; Deltoid Right.; 0.5 milliLiter(s); VIS (VIS Published: 15-Aug-2019, VIS Presented: 21-Sep-2020);

## 2024-09-09 NOTE — PROVIDER CONTACT NOTE (OTHER) - BACKGROUND
Pt admitted with dx. ICD shock d/t oversensing, pending ICD extraction & reimplant. PMH includes HFrEF, CAD. On Bumex gtt 8a-8p. Multiple episodes of WCTs during this admission with the most 15 beats.
Pt admitted with ICD oversensing, AonC HFrEF. S/P ICD extraction and reimplantation with EV-ICD 9/3.
Pt admitted for AICD misfiring, s/p AICD extraction and re-implanted.
Pt admitted for AICD misfiring, s/p AICD extraction and re-implanted.
64 YO M admitted for AICD shock x 1, ICD interrogated, LLE BKA, Inpatient fall, DMII, Fluid overload
Patient admitted for ICD shock.
64 YO M admitted for Acute on chronic systolic CHF
Pt admitted for CHF

## 2024-09-09 NOTE — PROGRESS NOTE ADULT - NS ATTEND AMEND GEN_ALL_CORE FT
Patient care and plan discussed and reviewed with Advanced Care Provider. Plan as outlined above edited by me to reflect our discussion.
Plan for ICD extraction and EVICD implantation on Tuesday.
Seen, examined with, formulated plan with and  agree with above as scribed by NP Quin [Stevan]     + JVD  lungs no rales  heart RRR   ext BKA Left  + edema  RLE dressing  + macular rash     MANJIT improving   CHF   restart bumex 2 mg bid and entresto
seen and agree
Awaiting S-ICD explant and EV-ICD implant with Dr. Rodríguez next Tues, NPO after MN on Monday night, active T/S, no Heparin products on Tues.    IVORY Hutton.
Patient care and plan discussed and reviewed with Advanced Care Provider. Plan as outlined above edited by me to reflect our discussion.
Patient care and plan discussed and reviewed with Advanced Care Provider. Plan as outlined above edited by me to reflect our discussion.
seen and agree  Inappropriate shocks: No vectors work for S-ICD. When other issues resolved plan for S-ICD removal and EV-ICD replacement
DCM/VT: S/P S-ICD removal and EV-ICD implant
Patient care and plan discussed and reviewed with Advanced Care Provider. Plan as outlined above edited by me to reflect our discussion.
Patient care and plan discussed and reviewed with Advanced Care Provider. Plan as outlined above edited by me to reflect our discussion.
seen and agree
plan for S-ICD removal and EV-ICD implant  needs to be optimized from a Heart Failure and Infectious standpoint
seen and agree  patient amenable to S-ICD removal with EV-ICD placement

## 2024-09-09 NOTE — PROGRESS NOTE ADULT - SUBJECTIVE AND OBJECTIVE BOX
DATE OF SERVICE: 09-09-24 @ 12:24    Patient is a 63y old  Male who presents with a chief complaint of AICD firing (01 Sep 2024 11:21)      INTERVAL HISTORY: Feels well, denies chest pain and SOB    REVIEW OF SYSTEMS:  CONSTITUTIONAL: No weakness  EYES/ENT: No visual changes;  No throat pain   NECK: No pain or stiffness  RESPIRATORY: No cough, wheezing; No shortness of breath  CARDIOVASCULAR: No chest pain or palpitations  GASTROINTESTINAL: No abdominal  pain. No nausea, vomiting, or hematemesis  GENITOURINARY: No dysuria, frequency or hematuria  NEUROLOGICAL: No stroke like symptoms  SKIN: No rashes    TELEMETRY Personally reviewed: SR 80-90, 11 beats of WCT  	  MEDICATIONS:  buMETAnide 2 milliGRAM(s) Oral two times a day  sacubitril 24 mG/valsartan 26 mG 1 Tablet(s) Oral two times a day        PHYSICAL EXAM:  T(C): 36.8 (09-09-24 @ 11:24), Max: 36.9 (09-08-24 @ 20:40)  HR: 86 (09-09-24 @ 11:24) (86 - 97)  BP: 97/68 (09-09-24 @ 11:24) (97/68 - 109/70)  RR: 18 (09-09-24 @ 11:24) (18 - 18)  SpO2: 97% (09-09-24 @ 11:24) (94% - 97%)  Wt(kg): --  I&O's Summary    08 Sep 2024 07:01  -  09 Sep 2024 07:00  --------------------------------------------------------  IN: 480 mL / OUT: 5200 mL / NET: -4720 mL    09 Sep 2024 07:01  -  09 Sep 2024 12:24  --------------------------------------------------------  IN: 100 mL / OUT: 0 mL / NET: 100 mL          Appearance: In no distress	  HEENT:    PERRL, EOMI	  Cardiovascular:  S1 S2, No JVD  Respiratory: Lungs clear to auscultation	  Gastrointestinal:  Soft, Non-tender, + BS	  Vascularature:  No edema of LE  Psychiatric: Appropriate affect   Neuro: no acute focal deficits           09-09    139  |  98  |  52<H>  ----------------------------<  196<H>  3.4<L>   |  26  |  1.76<H>    Ca    9.3      09 Sep 2024 04:32  Mg     2.2     09-09          Labs personally reviewed      ASSESSMENT/PLAN: 	  Problem/Plan - 1:  ·  Problem: Acute decompensated systolic heart failure.   ·  Plan:    - TTE 8/26 similar to prior wiith EF 20%  - Will slowly resume sHF GDMT as BP tolerates  --- Toprol 25mg daily - resume 9/9 if BP tolerates  --- Entresto 24/26mg BID resumed  --- Aldactone 25mg  --- Jardiance 10mg daily  --- Home dose Bumex 4mg PO daily  - Appreciate HF recommendations; Possible CardioMems this admission ? pt prefers to do as outpt  - As per wife, dry weight ~247lbs  - TTE with trace pericardial effusion with no evidence of tamponade. No further interval changes.      Problem/Plan - 2:  ·  Problem: CAD (coronary artery disease).   ·  Plan: c/w asa and Plavix    Problem/Plan - 3:  ·  Problem: Chronic kidney disease, unspecified CKD stage.   ·  Plan: Monitor BMP daily, dose meds per renal fx, avoid nephrotoxins.    Problem/Plan - 4:  ·  Problem: ICD shock   ·  Plan: Inappropriate as per EP  - settings adjusted  - s/p ICD extraction and re-implant, (9/3)  - Current ICD disabled--> pacing pads on patient    Problem/Plan - 5:  ·  Problem: Prophylactic measure.   ·  Plan: VTE ppx: hep sq           PJ Fraser, DO [facility]  Cardiovascular Medicine  [address], [address]  Available via call or text on [employer] TE[employer]  Office: [phone]   DATE OF SERVICE: 09-09-24 @ 12:24    Patient is a 63y old  Male who presents with a chief complaint of AICD firing (01 Sep 2024 11:21)      INTERVAL HISTORY: Feels well, denies chest pain and SOB    REVIEW OF SYSTEMS:  CONSTITUTIONAL: No weakness  EYES/ENT: No visual changes;  No throat pain   NECK: No pain or stiffness  RESPIRATORY: No cough, wheezing; No shortness of breath  CARDIOVASCULAR: No chest pain or palpitations  GASTROINTESTINAL: No abdominal  pain. No nausea, vomiting, or hematemesis  GENITOURINARY: No dysuria, frequency or hematuria  NEUROLOGICAL: No stroke like symptoms  SKIN: No rashes    TELEMETRY Personally reviewed: SR 80-90, 11 beats of WCT  	  MEDICATIONS:  buMETAnide 2 milliGRAM(s) Oral two times a day  sacubitril 24 mG/valsartan 26 mG 1 Tablet(s) Oral two times a day        PHYSICAL EXAM:  T(C): 36.8 (09-09-24 @ 11:24), Max: 36.9 (09-08-24 @ 20:40)  HR: 86 (09-09-24 @ 11:24) (86 - 97)  BP: 97/68 (09-09-24 @ 11:24) (97/68 - 109/70)  RR: 18 (09-09-24 @ 11:24) (18 - 18)  SpO2: 97% (09-09-24 @ 11:24) (94% - 97%)  Wt(kg): --  I&O's Summary    08 Sep 2024 07:01  -  09 Sep 2024 07:00  --------------------------------------------------------  IN: 480 mL / OUT: 5200 mL / NET: -4720 mL    09 Sep 2024 07:01  -  09 Sep 2024 12:24  --------------------------------------------------------  IN: 100 mL / OUT: 0 mL / NET: 100 mL          Appearance: In no distress	  HEENT:    PERRL, EOMI	  Cardiovascular:  S1 S2, No JVD  Respiratory: Lungs clear to auscultation	  Gastrointestinal:  Soft, Non-tender, + BS	  Vascularature:  No edema of LE  Psychiatric: Appropriate affect   Neuro: no acute focal deficits           09-09    139  |  98  |  52<H>  ----------------------------<  196<H>  3.4<L>   |  26  |  1.76<H>    Ca    9.3      09 Sep 2024 04:32  Mg     2.2     09-09          Labs personally reviewed      ASSESSMENT/PLAN: 	  Problem/Plan - 1:  ·  Problem: Acute decompensated systolic heart failure.   ·  Plan:    - TTE 8/26 similar to prior wiith EF 20%  - Will slowly resume sHF GDMT as BP tolerates  --- Toprol 25mg daily - resumed 9/9  --- Entresto 24/26mg BID resumed  --- Aldactone 25mg as OP  --- Jardiance 10mg daily  --- Home dose Bumex 4mg PO daily  - Appreciate HF recommendations; Possible CardioMems this admission ? pt prefers to do as outpt  - As per wife, dry weight ~247lbs  - TTE with trace pericardial effusion with no evidence of tamponade. No further interval changes.      Problem/Plan - 2:  ·  Problem: CAD (coronary artery disease).   ·  Plan: c/w asa and Plavix    Problem/Plan - 3:  ·  Problem: Chronic kidney disease, unspecified CKD stage.   ·  Plan: Monitor BMP daily, dose meds per renal fx, avoid nephrotoxins.    Problem/Plan - 4:  ·  Problem: ICD shock   ·  Plan: Inappropriate as per EP  - settings adjusted  - s/p ICD extraction and re-implant, (9/3)  - Current ICD disabled--> pacing pads on patient    Problem/Plan - 5:  ·  Problem: Prophylactic measure.   ·  Plan: VTE ppx: hep sq           PJ Fraser DO [facility]  Cardiovascular Medicine  [address], [address]  Available via call or text on [employer] TE[employer]  Office: [phone]   DATE OF SERVICE: 09-09-24 @ 12:24    Patient is a 63y old  Male who presents with a chief complaint of AICD firing (01 Sep 2024 11:21)      INTERVAL HISTORY: Feels well, denies chest pain and SOB    REVIEW OF SYSTEMS:  CONSTITUTIONAL: No weakness  EYES/ENT: No visual changes;  No throat pain   NECK: No pain or stiffness  RESPIRATORY: No cough, wheezing; No shortness of breath  CARDIOVASCULAR: No chest pain or palpitations  GASTROINTESTINAL: No abdominal  pain. No nausea, vomiting, or hematemesis  GENITOURINARY: No dysuria, frequency or hematuria  NEUROLOGICAL: No stroke like symptoms  SKIN: No rashes    TELEMETRY Personally reviewed: SR 80-90, 11 beats of WCT  	  MEDICATIONS:  buMETAnide 2 milliGRAM(s) Oral two times a day  sacubitril 24 mG/valsartan 26 mG 1 Tablet(s) Oral two times a day        PHYSICAL EXAM:  T(C): 36.8 (09-09-24 @ 11:24), Max: 36.9 (09-08-24 @ 20:40)  HR: 86 (09-09-24 @ 11:24) (86 - 97)  BP: 97/68 (09-09-24 @ 11:24) (97/68 - 109/70)  RR: 18 (09-09-24 @ 11:24) (18 - 18)  SpO2: 97% (09-09-24 @ 11:24) (94% - 97%)  Wt(kg): --  I&O's Summary    08 Sep 2024 07:01  -  09 Sep 2024 07:00  --------------------------------------------------------  IN: 480 mL / OUT: 5200 mL / NET: -4720 mL    09 Sep 2024 07:01  -  09 Sep 2024 12:24  --------------------------------------------------------  IN: 100 mL / OUT: 0 mL / NET: 100 mL          Appearance: In no distress	  HEENT:    PERRL, EOMI	  Cardiovascular:  S1 S2, No JVD  Respiratory: Lungs clear to auscultation	  Gastrointestinal:  Soft, Non-tender, + BS	  Vascularature:  No edema of LE  Psychiatric: Appropriate affect   Neuro: no acute focal deficits           09-09    139  |  98  |  52<H>  ----------------------------<  196<H>  3.4<L>   |  26  |  1.76<H>    Ca    9.3      09 Sep 2024 04:32  Mg     2.2     09-09          Labs personally reviewed      ASSESSMENT/PLAN: 	  Problem/Plan - 1:  ·  Problem: Acute decompensated systolic heart failure.   ·  Plan:    - TTE 8/26 similar to prior wiith EF 20%  - Will slowly resume sHF GDMT as BP tolerates  --- Toprol 25mg daily - resumed 9/9  --- Entresto 24/26mg BID resumed  --- Aldactone 25mg as OP  --- Jardiance 10mg daily  --- Home dose Bumex 4mg PO daily  - Appreciate HF recommendations; Possible CardioMems this admission ? pt prefers to do as outpt  - As per wife, dry weight ~247lbs  - TTE with trace pericardial effusion with no evidence of tamponade. No further interval changes.      Problem/Plan - 2:  ·  Problem: CAD (coronary artery disease).   ·  Plan: c/w asa and Plavix    Problem/Plan - 3:  ·  Problem: Chronic kidney disease, unspecified CKD stage.   ·  Plan: Monitor BMP daily, dose meds per renal fx, avoid nephrotoxins.    Problem/Plan - 4:  ·  Problem: ICD shock   ·  Plan: Inappropriate as per EP  - settings adjusted  - s/p ICD extraction and re-implant, (9/3)  - Current ICD disabled--> pacing pads on patient    Problem/Plan - 5:  ·  Problem: Prophylactic measure.   ·  Plan: VTE ppx: hep sq       OP follow up with Dr Jax Green, AGNP-C  Shawn Barnes DO [facility]  Cardiovascular Medicine  [address], [address]  Available via call or text on [employer] TEAM[employer]  Office: [phone]

## 2024-09-09 NOTE — PROVIDER CONTACT NOTE (OTHER) - SITUATION
As per tele tech patient had 11 beats WCT.
Pt hypotensive, asymptomatic. Due for BP meds
Tele reported 15 bts WCT up to 120, 2 sec after 8 bts WCT up to 120.
6 beats WCT at 11:06
Pt c/o generalized itchy feeling, requesting Benadryl
Tele Event - 6 bts WCT
? WCTs detected on tele (5, 4, then 1)
7 beats WCT

## 2024-09-09 NOTE — PROGRESS NOTE ADULT - NS ATTEND OPT1 GEN_ALL_CORE
I attest my time as attending is greater than 50% of the total combined time spent on qualifying patient care activities by the PA/NP and attending.
I attest my time as attending is greater than 50% of the total combined time spent on qualifying patient care activities by the PA/NP and attending.
lyrica
I attest my time as attending is greater than 50% of the total combined time spent on qualifying patient care activities by the PA/NP and attending.

## 2024-09-09 NOTE — PROGRESS NOTE ADULT - ASSESSMENT
63 year old Male with PMHx HFrEF s/p ICD, HTN, T2DM, s/p LLE amputation in [place] ~2 months ago. Pt presented to ED for ICD shock.        1- CKDIII  2- CHF  3- DM2  4- anemia  5- cellulitis   6- rash         servando in setting of overdiuresis leading to hypotension  creatinine returned to baseline  cont bumex 2mg po bid  resume entresto   + gamma migrating protein present. to see heme  outpt  d.w pt /pt wife they aware   strict I/O

## 2024-09-09 NOTE — PROGRESS NOTE ADULT - NS ATTEND BILL GEN_ALL_CORE
Attending to bill
PA/NP to bill
Attending to bill

## 2024-09-09 NOTE — PROVIDER CONTACT NOTE (OTHER) - DATE AND TIME:
09-Sep-2024 03:48
26-Aug-2024 12:17
27-Aug-2024 14:10
01-Sep-2024 15:01
04-Sep-2024 07:38
04-Sep-2024 09:40
07-Sep-2024 08:17
28-Aug-2024 14:10

## 2024-09-09 NOTE — DISCHARGE NOTE NURSING/CASE MANAGEMENT/SOCIAL WORK - PATIENT PORTAL LINK FT
You can access the FollowMyHealth Patient Portal offered by [facility] by registering at the following website: [email or web address]. By joining [employer]’s FollowMyHealth portal, you will also be able to view your health information using other applications (apps) compatible with our system.

## 2024-09-10 ENCOUNTER — NON-APPOINTMENT (OUTPATIENT)
Age: 64
End: 2024-09-10

## 2024-09-10 NOTE — CHART NOTE - NSCHARTNOTESELECT_GEN_ALL_CORE
Event Note
Inpatient Fall/Event Note
[place]/Event Note
dc note/Event Note
Hypotension/Event Note
hypogycemia/Event Note

## 2024-09-11 ENCOUNTER — NON-APPOINTMENT (OUTPATIENT)
Age: 64
End: 2024-09-11

## 2024-09-11 ENCOUNTER — APPOINTMENT (OUTPATIENT)
Dept: ELECTROPHYSIOLOGY | Facility: CLINIC | Age: 64
End: 2024-09-11
Payer: MEDICARE

## 2024-09-11 VITALS — SYSTOLIC BLOOD PRESSURE: 103 MMHG | DIASTOLIC BLOOD PRESSURE: 65 MMHG | OXYGEN SATURATION: 96 % | HEART RATE: 94 BPM

## 2024-09-11 PROCEDURE — 93000 ELECTROCARDIOGRAM COMPLETE: CPT | Mod: XU

## 2024-09-11 PROCEDURE — 99024 POSTOP FOLLOW-UP VISIT: CPT

## 2024-09-11 PROCEDURE — 93261 INTERROGATE SUBQ DEFIB: CPT

## 2024-09-29 PROCEDURE — 70450 CT HEAD/BRAIN W/O DYE: CPT | Mod: MC

## 2024-09-29 PROCEDURE — C8929: CPT

## 2024-09-29 PROCEDURE — 83010 ASSAY OF HAPTOGLOBIN QUANT: CPT

## 2024-09-29 PROCEDURE — 97164 PT RE-EVAL EST PLAN CARE: CPT

## 2024-09-29 PROCEDURE — 97162 PT EVAL MOD COMPLEX 30 MIN: CPT

## 2024-09-29 PROCEDURE — 84165 PROTEIN E-PHORESIS SERUM: CPT

## 2024-09-29 PROCEDURE — 86901 BLOOD TYPING SEROLOGIC RH(D): CPT

## 2024-09-29 PROCEDURE — 83735 ASSAY OF MAGNESIUM: CPT

## 2024-09-29 PROCEDURE — 82962 GLUCOSE BLOOD TEST: CPT

## 2024-09-29 PROCEDURE — 93005 ELECTROCARDIOGRAM TRACING: CPT

## 2024-09-29 PROCEDURE — 84155 ASSAY OF PROTEIN SERUM: CPT

## 2024-09-29 PROCEDURE — C1889: CPT

## 2024-09-29 PROCEDURE — 82784 ASSAY IGA/IGD/IGG/IGM EACH: CPT

## 2024-09-29 PROCEDURE — C1894: CPT

## 2024-09-29 PROCEDURE — 76377 3D RENDER W/INTRP POSTPROCES: CPT

## 2024-09-29 PROCEDURE — 82728 ASSAY OF FERRITIN: CPT

## 2024-09-29 PROCEDURE — 83521 IG LIGHT CHAINS FREE EACH: CPT

## 2024-09-29 PROCEDURE — 84484 ASSAY OF TROPONIN QUANT: CPT

## 2024-09-29 PROCEDURE — 76000 FLUOROSCOPY <1 HR PHYS/QHP: CPT

## 2024-09-29 PROCEDURE — 85014 HEMATOCRIT: CPT

## 2024-09-29 PROCEDURE — C1876: CPT

## 2024-09-29 PROCEDURE — C1722: CPT

## 2024-09-29 PROCEDURE — 85045 AUTOMATED RETICULOCYTE COUNT: CPT

## 2024-09-29 PROCEDURE — 71046 X-RAY EXAM CHEST 2 VIEWS: CPT

## 2024-09-29 PROCEDURE — 83550 IRON BINDING TEST: CPT

## 2024-09-29 PROCEDURE — 80053 COMPREHEN METABOLIC PANEL: CPT

## 2024-09-29 PROCEDURE — 86900 BLOOD TYPING SEROLOGIC ABO: CPT

## 2024-09-29 PROCEDURE — 86334 IMMUNOFIX E-PHORESIS SERUM: CPT

## 2024-09-29 PROCEDURE — 93308 TTE F-UP OR LMTD: CPT

## 2024-09-29 PROCEDURE — 82435 ASSAY OF BLOOD CHLORIDE: CPT

## 2024-09-29 PROCEDURE — 80048 BASIC METABOLIC PNL TOTAL CA: CPT

## 2024-09-29 PROCEDURE — 83036 HEMOGLOBIN GLYCOSYLATED A1C: CPT

## 2024-09-29 PROCEDURE — 84295 ASSAY OF SERUM SODIUM: CPT

## 2024-09-29 PROCEDURE — 82947 ASSAY GLUCOSE BLOOD QUANT: CPT

## 2024-09-29 PROCEDURE — 82607 VITAMIN B-12: CPT

## 2024-09-29 PROCEDURE — 82803 BLOOD GASES ANY COMBINATION: CPT

## 2024-09-29 PROCEDURE — 83540 ASSAY OF IRON: CPT

## 2024-09-29 PROCEDURE — 86850 RBC ANTIBODY SCREEN: CPT

## 2024-09-29 PROCEDURE — 85027 COMPLETE CBC AUTOMATED: CPT

## 2024-09-29 PROCEDURE — 84132 ASSAY OF SERUM POTASSIUM: CPT

## 2024-09-29 PROCEDURE — 83605 ASSAY OF LACTIC ACID: CPT

## 2024-09-29 PROCEDURE — 97110 THERAPEUTIC EXERCISES: CPT

## 2024-09-29 PROCEDURE — 36415 COLL VENOUS BLD VENIPUNCTURE: CPT

## 2024-09-29 PROCEDURE — 97116 GAIT TRAINING THERAPY: CPT

## 2024-09-29 PROCEDURE — 99285 EMERGENCY DEPT VISIT HI MDM: CPT | Mod: 25

## 2024-09-29 PROCEDURE — 97530 THERAPEUTIC ACTIVITIES: CPT

## 2024-09-29 PROCEDURE — 83880 ASSAY OF NATRIURETIC PEPTIDE: CPT

## 2024-09-29 PROCEDURE — 73564 X-RAY EXAM KNEE 4 OR MORE: CPT

## 2024-09-29 PROCEDURE — 96374 THER/PROPH/DIAG INJ IV PUSH: CPT

## 2024-09-29 PROCEDURE — 84100 ASSAY OF PHOSPHORUS: CPT

## 2024-09-29 PROCEDURE — 85025 COMPLETE CBC W/AUTO DIFF WBC: CPT

## 2024-09-29 PROCEDURE — 82330 ASSAY OF CALCIUM: CPT

## 2024-09-29 PROCEDURE — 70486 CT MAXILLOFACIAL W/O DYE: CPT | Mod: MC

## 2024-09-29 PROCEDURE — 85018 HEMOGLOBIN: CPT

## 2024-09-29 PROCEDURE — P9045: CPT

## 2024-09-29 PROCEDURE — 86923 COMPATIBILITY TEST ELECTRIC: CPT

## 2024-09-29 PROCEDURE — C9399: CPT

## 2024-09-29 PROCEDURE — C1769: CPT

## 2024-11-18 NOTE — PROGRESS NOTE ADULT - PROBLEM/PLAN-8
Called to schedule patient for cataract surgery with  in January. No ans LVM  
DISPLAY PLAN FREE TEXT

## 2024-12-13 ENCOUNTER — APPOINTMENT (OUTPATIENT)
Dept: ELECTROPHYSIOLOGY | Facility: CLINIC | Age: 64
End: 2024-12-13

## 2024-12-17 ENCOUNTER — INPATIENT (INPATIENT)
Facility: HOSPITAL | Age: 64
LOS: 3 days | Discharge: HOME | DRG: 291 | End: 2024-12-21
Attending: INTERNAL MEDICINE | Admitting: INTERNAL MEDICINE
Payer: MEDICARE

## 2024-12-17 VITALS
TEMPERATURE: 99 F | HEART RATE: 88 BPM | OXYGEN SATURATION: 97 % | DIASTOLIC BLOOD PRESSURE: 73 MMHG | HEIGHT: 77 IN | WEIGHT: 265 LBS | RESPIRATION RATE: 17 BRPM | SYSTOLIC BLOOD PRESSURE: 109 MMHG

## 2024-12-17 DIAGNOSIS — Z75.8 OTHER PROBLEMS RELATED TO MEDICAL FACILITIES AND OTHER HEALTH CARE: ICD-10-CM

## 2024-12-17 DIAGNOSIS — Z89.512 ACQUIRED ABSENCE OF LEFT LEG BELOW KNEE: ICD-10-CM

## 2024-12-17 DIAGNOSIS — Z29.9 ENCOUNTER FOR PROPHYLACTIC MEASURES, UNSPECIFIED: ICD-10-CM

## 2024-12-17 DIAGNOSIS — J44.0 CHRONIC OBSTRUCTIVE PULMONARY DISEASE WITH (ACUTE) LOWER RESPIRATORY INFECTION: ICD-10-CM

## 2024-12-17 DIAGNOSIS — E11.9 TYPE 2 DIABETES MELLITUS WITHOUT COMPLICATIONS: ICD-10-CM

## 2024-12-17 DIAGNOSIS — I45.2 BIFASCICULAR BLOCK: ICD-10-CM

## 2024-12-17 DIAGNOSIS — Z98.52 VASECTOMY STATUS: Chronic | ICD-10-CM

## 2024-12-17 DIAGNOSIS — Z95.810 PRESENCE OF AUTOMATIC (IMPLANTABLE) CARDIAC DEFIBRILLATOR: ICD-10-CM

## 2024-12-17 DIAGNOSIS — I50.22 CHRONIC SYSTOLIC (CONGESTIVE) HEART FAILURE: ICD-10-CM

## 2024-12-17 DIAGNOSIS — N18.30 CHRONIC KIDNEY DISEASE, STAGE 3 UNSPECIFIED: ICD-10-CM

## 2024-12-17 LAB
ALBUMIN SERPL ELPH-MCNC: 4 G/DL — SIGNIFICANT CHANGE UP (ref 3.3–5)
ALP SERPL-CCNC: 134 U/L — HIGH (ref 40–120)
ALT FLD-CCNC: 8 U/L — LOW (ref 10–45)
ANION GAP SERPL CALC-SCNC: 16 MMOL/L — SIGNIFICANT CHANGE UP (ref 5–17)
APTT BLD: 35.7 SEC — HIGH (ref 24.5–35.6)
AST SERPL-CCNC: 11 U/L — SIGNIFICANT CHANGE UP (ref 10–40)
BASOPHILS # BLD AUTO: 0.05 K/UL — SIGNIFICANT CHANGE UP (ref 0–0.2)
BASOPHILS NFR BLD AUTO: 0.7 % — SIGNIFICANT CHANGE UP (ref 0–2)
BILIRUB SERPL-MCNC: 0.9 MG/DL — SIGNIFICANT CHANGE UP (ref 0.2–1.2)
BUN SERPL-MCNC: 48 MG/DL — HIGH (ref 7–23)
CALCIUM SERPL-MCNC: 9 MG/DL — SIGNIFICANT CHANGE UP (ref 8.4–10.5)
CHLORIDE SERPL-SCNC: 97 MMOL/L — SIGNIFICANT CHANGE UP (ref 96–108)
CK MB BLD-MCNC: 1.6 % — SIGNIFICANT CHANGE UP (ref 0–3.5)
CK MB CFR SERPL CALC: 10.7 NG/ML — HIGH (ref 0–6.7)
CK SERPL-CCNC: 651 U/L — HIGH (ref 30–200)
CO2 SERPL-SCNC: 26 MMOL/L — SIGNIFICANT CHANGE UP (ref 22–31)
CREAT SERPL-MCNC: 1.88 MG/DL — HIGH (ref 0.5–1.3)
EGFR: 40 ML/MIN/1.73M2 — LOW
EGFR: 40 ML/MIN/1.73M2 — LOW
EOSINOPHIL # BLD AUTO: 0.19 K/UL — SIGNIFICANT CHANGE UP (ref 0–0.5)
EOSINOPHIL NFR BLD AUTO: 2.6 % — SIGNIFICANT CHANGE UP (ref 0–6)
FLUAV AG NPH QL: SIGNIFICANT CHANGE UP
FLUBV AG NPH QL: SIGNIFICANT CHANGE UP
GLUCOSE BLDC GLUCOMTR-MCNC: 234 MG/DL — HIGH (ref 70–99)
GLUCOSE SERPL-MCNC: 157 MG/DL — HIGH (ref 70–99)
HCT VFR BLD CALC: 38.1 % — LOW (ref 39–50)
HGB BLD-MCNC: 10.9 G/DL — LOW (ref 13–17)
IMM GRANULOCYTES NFR BLD AUTO: 1.1 % — HIGH (ref 0–0.9)
INR BLD: 1.49 RATIO — HIGH (ref 0.85–1.16)
LYMPHOCYTES # BLD AUTO: 0.87 K/UL — LOW (ref 1–3.3)
LYMPHOCYTES # BLD AUTO: 12.1 % — LOW (ref 13–44)
MCHC RBC-ENTMCNC: 21.5 PG — LOW (ref 27–34)
MCHC RBC-ENTMCNC: 28.6 G/DL — LOW (ref 32–36)
MCV RBC AUTO: 75.3 FL — LOW (ref 80–100)
MONOCYTES # BLD AUTO: 0.79 K/UL — SIGNIFICANT CHANGE UP (ref 0–0.9)
MONOCYTES NFR BLD AUTO: 11 % — SIGNIFICANT CHANGE UP (ref 2–14)
NEUTROPHILS # BLD AUTO: 5.22 K/UL — SIGNIFICANT CHANGE UP (ref 1.8–7.4)
NEUTROPHILS NFR BLD AUTO: 72.5 % — SIGNIFICANT CHANGE UP (ref 43–77)
NRBC # BLD: 0 /100 WBCS — SIGNIFICANT CHANGE UP (ref 0–0)
NRBC BLD-RTO: 0 /100 WBCS — SIGNIFICANT CHANGE UP (ref 0–0)
NT-PROBNP SERPL-SCNC: 3909 PG/ML — HIGH (ref 0–300)
PLATELET # BLD AUTO: 109 K/UL — LOW (ref 150–400)
POTASSIUM SERPL-MCNC: 4.2 MMOL/L — SIGNIFICANT CHANGE UP (ref 3.5–5.3)
POTASSIUM SERPL-SCNC: 4.2 MMOL/L — SIGNIFICANT CHANGE UP (ref 3.5–5.3)
PROT SERPL-MCNC: 7.5 G/DL — SIGNIFICANT CHANGE UP (ref 6–8.3)
PROTHROM AB SERPL-ACNC: 17.1 SEC — HIGH (ref 9.9–13.4)
RBC # BLD: 5.06 M/UL — SIGNIFICANT CHANGE UP (ref 4.2–5.8)
RBC # FLD: 19.9 % — HIGH (ref 10.3–14.5)
RSV RNA NPH QL NAA+NON-PROBE: SIGNIFICANT CHANGE UP
SARS-COV-2 RNA SPEC QL NAA+PROBE: SIGNIFICANT CHANGE UP
SODIUM SERPL-SCNC: 139 MMOL/L — SIGNIFICANT CHANGE UP (ref 135–145)
TROPONIN T, HIGH SENSITIVITY RESULT: 104 NG/L — HIGH (ref 0–51)
TROPONIN T, HIGH SENSITIVITY RESULT: 106 NG/L — HIGH (ref 0–51)
TROPONIN T, HIGH SENSITIVITY RESULT: 112 NG/L — HIGH (ref 0–51)
WBC # BLD: 7.2 K/UL — SIGNIFICANT CHANGE UP (ref 3.8–10.5)
WBC # FLD AUTO: 7.2 K/UL — SIGNIFICANT CHANGE UP (ref 3.8–10.5)

## 2024-12-17 RX ORDER — SODIUM CHLORIDE 9 G/1000ML
1000 INJECTION, SOLUTION INTRAVENOUS
Refills: 0 | Status: DISCONTINUED | OUTPATIENT
Start: 2024-12-17 | End: 2024-12-21

## 2024-12-17 RX ORDER — HEPARIN SODIUM 1000 [USP'U]/ML
5000 INJECTION INTRAVENOUS; SUBCUTANEOUS EVERY 8 HOURS
Refills: 0 | Status: DISCONTINUED | OUTPATIENT
Start: 2024-12-17 | End: 2024-12-21

## 2024-12-17 RX ORDER — BUMETANIDE 1 MG/1
3 TABLET ORAL
Refills: 0 | Status: DISCONTINUED | OUTPATIENT
Start: 2024-12-17 | End: 2024-12-19

## 2024-12-17 RX ORDER — ASPIRIN 325 MG
81 TABLET ORAL DAILY
Refills: 0 | Status: DISCONTINUED | OUTPATIENT
Start: 2024-12-18 | End: 2024-12-21

## 2024-12-17 RX ORDER — IPRATROPIUM BROMIDE AND ALBUTEROL SULFATE .5; 2.5 MG/3ML; MG/3ML
3 SOLUTION RESPIRATORY (INHALATION) EVERY 6 HOURS
Refills: 0 | Status: DISCONTINUED | OUTPATIENT
Start: 2024-12-17 | End: 2024-12-21

## 2024-12-17 RX ORDER — ROSUVASTATIN CALCIUM 20 MG/1
20 TABLET, FILM COATED ORAL AT BEDTIME
Refills: 0 | Status: DISCONTINUED | OUTPATIENT
Start: 2024-12-17 | End: 2024-12-21

## 2024-12-17 RX ORDER — CLOPIDOGREL BISULFATE 75 MG/1
75 TABLET, FILM COATED ORAL DAILY
Refills: 0 | Status: DISCONTINUED | OUTPATIENT
Start: 2024-12-17 | End: 2024-12-21

## 2024-12-17 RX ORDER — GLUCAGON 3 MG/1
1 POWDER NASAL ONCE
Refills: 0 | Status: DISCONTINUED | OUTPATIENT
Start: 2024-12-17 | End: 2024-12-21

## 2024-12-17 RX ORDER — DEXTROSE 50 % IN WATER 50 %
25 SYRINGE (ML) INTRAVENOUS ONCE
Refills: 0 | Status: DISCONTINUED | OUTPATIENT
Start: 2024-12-17 | End: 2024-12-21

## 2024-12-17 RX ORDER — INSULIN LISPRO 100 U/ML
INJECTION, SOLUTION INTRAVENOUS; SUBCUTANEOUS
Refills: 0 | Status: DISCONTINUED | OUTPATIENT
Start: 2024-12-17 | End: 2024-12-21

## 2024-12-17 RX ORDER — INSULIN GLARGINE-YFGN 100 [IU]/ML
18 INJECTION, SOLUTION SUBCUTANEOUS AT BEDTIME
Refills: 0 | Status: DISCONTINUED | OUTPATIENT
Start: 2024-12-17 | End: 2024-12-21

## 2024-12-17 RX ORDER — ACETAMINOPHEN 500 MG/5ML
650 LIQUID (ML) ORAL EVERY 6 HOURS
Refills: 0 | Status: DISCONTINUED | OUTPATIENT
Start: 2024-12-17 | End: 2024-12-21

## 2024-12-17 RX ORDER — DEXTROSE 50 % IN WATER 50 %
12.5 SYRINGE (ML) INTRAVENOUS ONCE
Refills: 0 | Status: DISCONTINUED | OUTPATIENT
Start: 2024-12-17 | End: 2024-12-21

## 2024-12-17 RX ORDER — SACUBITRIL AND VALSARTAN 6; 6 MG/1; MG/1
1 PELLET ORAL
Refills: 0 | Status: DISCONTINUED | OUTPATIENT
Start: 2024-12-18 | End: 2024-12-21

## 2024-12-17 RX ORDER — METOPROLOL SUCCINATE 50 MG/1
25 TABLET, EXTENDED RELEASE ORAL DAILY
Refills: 0 | Status: DISCONTINUED | OUTPATIENT
Start: 2024-12-18 | End: 2024-12-21

## 2024-12-17 RX ORDER — DOXYCYCLINE HYCLATE 100 MG
100 TABLET ORAL EVERY 12 HOURS
Refills: 0 | Status: DISCONTINUED | OUTPATIENT
Start: 2024-12-17 | End: 2024-12-21

## 2024-12-17 RX ORDER — TAMSULOSIN HYDROCHLORIDE 0.4 MG/1
0.4 CAPSULE ORAL AT BEDTIME
Refills: 0 | Status: DISCONTINUED | OUTPATIENT
Start: 2024-12-17 | End: 2024-12-21

## 2024-12-17 RX ORDER — DEXTROSE 50 % IN WATER 50 %
15 SYRINGE (ML) INTRAVENOUS ONCE
Refills: 0 | Status: DISCONTINUED | OUTPATIENT
Start: 2024-12-17 | End: 2024-12-21

## 2024-12-17 RX ORDER — INSULIN LISPRO 100 U/ML
INJECTION, SOLUTION INTRAVENOUS; SUBCUTANEOUS AT BEDTIME
Refills: 0 | Status: DISCONTINUED | OUTPATIENT
Start: 2024-12-17 | End: 2024-12-21

## 2024-12-17 RX ADMIN — CLOPIDOGREL BISULFATE 75 MILLIGRAM(S): 75 TABLET, FILM COATED ORAL at 21:25

## 2024-12-17 RX ADMIN — IPRATROPIUM BROMIDE AND ALBUTEROL SULFATE 3 MILLILITER(S): .5; 2.5 SOLUTION RESPIRATORY (INHALATION) at 21:26

## 2024-12-17 RX ADMIN — Medication 1 DOSE(S): at 21:26

## 2024-12-17 RX ADMIN — HEPARIN SODIUM 5000 UNIT(S): 1000 INJECTION INTRAVENOUS; SUBCUTANEOUS at 21:26

## 2024-12-17 RX ADMIN — INSULIN GLARGINE-YFGN 18 UNIT(S): 100 INJECTION, SOLUTION SUBCUTANEOUS at 22:20

## 2024-12-17 RX ADMIN — TAMSULOSIN HYDROCHLORIDE 0.4 MILLIGRAM(S): 0.4 CAPSULE ORAL at 21:25

## 2024-12-17 RX ADMIN — ROSUVASTATIN CALCIUM 20 MILLIGRAM(S): 20 TABLET, FILM COATED ORAL at 21:25

## 2024-12-18 DIAGNOSIS — I50.9 HEART FAILURE, UNSPECIFIED: ICD-10-CM

## 2024-12-18 LAB
A1C WITH ESTIMATED AVERAGE GLUCOSE RESULT: 9.8 % — HIGH (ref 4–5.6)
ALBUMIN SERPL ELPH-MCNC: 3.8 G/DL — SIGNIFICANT CHANGE UP (ref 3.3–5)
ALP SERPL-CCNC: 131 U/L — HIGH (ref 40–120)
ALT FLD-CCNC: 7 U/L — LOW (ref 10–45)
ANION GAP SERPL CALC-SCNC: 15 MMOL/L — SIGNIFICANT CHANGE UP (ref 5–17)
AST SERPL-CCNC: 14 U/L — SIGNIFICANT CHANGE UP (ref 10–40)
B PERT DNA SPEC QL NAA+PROBE: SIGNIFICANT CHANGE UP
B PERT+PARAPERT DNA PNL NPH: SIGNIFICANT CHANGE UP
BILIRUB SERPL-MCNC: 0.8 MG/DL — SIGNIFICANT CHANGE UP (ref 0.2–1.2)
BUN SERPL-MCNC: 46 MG/DL — HIGH (ref 7–23)
CALCIUM SERPL-MCNC: 9.2 MG/DL — SIGNIFICANT CHANGE UP (ref 8.4–10.5)
CHLORIDE SERPL-SCNC: 100 MMOL/L — SIGNIFICANT CHANGE UP (ref 96–108)
CO2 SERPL-SCNC: 24 MMOL/L — SIGNIFICANT CHANGE UP (ref 22–31)
CREAT SERPL-MCNC: 1.81 MG/DL — HIGH (ref 0.5–1.3)
EGFR: 42 ML/MIN/1.73M2 — LOW
EGFR: 42 ML/MIN/1.73M2 — LOW
ESTIMATED AVERAGE GLUCOSE: 235 MG/DL — HIGH (ref 68–114)
GLUCOSE BLDC GLUCOMTR-MCNC: 158 MG/DL — HIGH (ref 70–99)
GLUCOSE BLDC GLUCOMTR-MCNC: 198 MG/DL — HIGH (ref 70–99)
GLUCOSE BLDC GLUCOMTR-MCNC: 203 MG/DL — HIGH (ref 70–99)
GLUCOSE BLDC GLUCOMTR-MCNC: 213 MG/DL — HIGH (ref 70–99)
GLUCOSE SERPL-MCNC: 182 MG/DL — HIGH (ref 70–99)
HCT VFR BLD CALC: 38 % — LOW (ref 39–50)
HGB BLD-MCNC: 10.8 G/DL — LOW (ref 13–17)
MCHC RBC-ENTMCNC: 21.7 PG — LOW (ref 27–34)
MCHC RBC-ENTMCNC: 28.4 G/DL — LOW (ref 32–36)
MCV RBC AUTO: 76.5 FL — LOW (ref 80–100)
NRBC # BLD: 0 /100 WBCS — SIGNIFICANT CHANGE UP (ref 0–0)
NRBC BLD-RTO: 0 /100 WBCS — SIGNIFICANT CHANGE UP (ref 0–0)
PLATELET # BLD AUTO: 106 K/UL — LOW (ref 150–400)
POTASSIUM SERPL-MCNC: 4 MMOL/L — SIGNIFICANT CHANGE UP (ref 3.5–5.3)
POTASSIUM SERPL-SCNC: 4 MMOL/L — SIGNIFICANT CHANGE UP (ref 3.5–5.3)
PROT SERPL-MCNC: 7.4 G/DL — SIGNIFICANT CHANGE UP (ref 6–8.3)
RAPID RVP RESULT: SIGNIFICANT CHANGE UP
RBC # BLD: 4.97 M/UL — SIGNIFICANT CHANGE UP (ref 4.2–5.8)
RBC # FLD: 20.4 % — HIGH (ref 10.3–14.5)
SARS-COV-2 RNA SPEC QL NAA+PROBE: SIGNIFICANT CHANGE UP
SODIUM SERPL-SCNC: 139 MMOL/L — SIGNIFICANT CHANGE UP (ref 135–145)
WBC # BLD: 6.4 K/UL — SIGNIFICANT CHANGE UP (ref 3.8–10.5)
WBC # FLD AUTO: 6.4 K/UL — SIGNIFICANT CHANGE UP (ref 3.8–10.5)

## 2024-12-18 RX ADMIN — CLOPIDOGREL BISULFATE 75 MILLIGRAM(S): 75 TABLET, FILM COATED ORAL at 12:28

## 2024-12-18 RX ADMIN — IPRATROPIUM BROMIDE AND ALBUTEROL SULFATE 3 MILLILITER(S): .5; 2.5 SOLUTION RESPIRATORY (INHALATION) at 05:45

## 2024-12-18 RX ADMIN — SACUBITRIL AND VALSARTAN 1 TABLET(S): 6; 6 PELLET ORAL at 05:44

## 2024-12-18 RX ADMIN — SACUBITRIL AND VALSARTAN 1 TABLET(S): 6; 6 PELLET ORAL at 17:19

## 2024-12-18 RX ADMIN — Medication 1 DOSE(S): at 08:14

## 2024-12-18 RX ADMIN — Medication 100 MILLIGRAM(S): at 05:45

## 2024-12-18 RX ADMIN — HEPARIN SODIUM 5000 UNIT(S): 1000 INJECTION INTRAVENOUS; SUBCUTANEOUS at 05:44

## 2024-12-18 RX ADMIN — INSULIN LISPRO 2: 100 INJECTION, SOLUTION INTRAVENOUS; SUBCUTANEOUS at 12:28

## 2024-12-18 RX ADMIN — METOPROLOL SUCCINATE 25 MILLIGRAM(S): 50 TABLET, EXTENDED RELEASE ORAL at 06:47

## 2024-12-18 RX ADMIN — Medication 40 MILLIGRAM(S): at 05:44

## 2024-12-18 RX ADMIN — INSULIN LISPRO 1: 100 INJECTION, SOLUTION INTRAVENOUS; SUBCUTANEOUS at 17:17

## 2024-12-18 RX ADMIN — INSULIN GLARGINE-YFGN 18 UNIT(S): 100 INJECTION, SOLUTION SUBCUTANEOUS at 22:00

## 2024-12-18 RX ADMIN — BUMETANIDE 124 MILLIGRAM(S): 1 TABLET ORAL at 14:00

## 2024-12-18 RX ADMIN — INSULIN LISPRO 1: 100 INJECTION, SOLUTION INTRAVENOUS; SUBCUTANEOUS at 08:13

## 2024-12-18 RX ADMIN — Medication 100 MILLIGRAM(S): at 17:19

## 2024-12-18 RX ADMIN — Medication 81 MILLIGRAM(S): at 12:28

## 2024-12-18 RX ADMIN — TAMSULOSIN HYDROCHLORIDE 0.4 MILLIGRAM(S): 0.4 CAPSULE ORAL at 21:59

## 2024-12-18 RX ADMIN — IPRATROPIUM BROMIDE AND ALBUTEROL SULFATE 3 MILLILITER(S): .5; 2.5 SOLUTION RESPIRATORY (INHALATION) at 17:17

## 2024-12-18 RX ADMIN — IPRATROPIUM BROMIDE AND ALBUTEROL SULFATE 3 MILLILITER(S): .5; 2.5 SOLUTION RESPIRATORY (INHALATION) at 12:28

## 2024-12-18 RX ADMIN — BUMETANIDE 124 MILLIGRAM(S): 1 TABLET ORAL at 05:48

## 2024-12-18 RX ADMIN — ROSUVASTATIN CALCIUM 20 MILLIGRAM(S): 20 TABLET, FILM COATED ORAL at 21:58

## 2024-12-18 RX ADMIN — Medication 1 DOSE(S): at 21:58

## 2024-12-19 LAB
ALBUMIN SERPL ELPH-MCNC: 3.8 G/DL — SIGNIFICANT CHANGE UP (ref 3.3–5)
ALP SERPL-CCNC: 133 U/L — HIGH (ref 40–120)
ALT FLD-CCNC: 6 U/L — LOW (ref 10–45)
ANION GAP SERPL CALC-SCNC: 16 MMOL/L — SIGNIFICANT CHANGE UP (ref 5–17)
AST SERPL-CCNC: 12 U/L — SIGNIFICANT CHANGE UP (ref 10–40)
BILIRUB SERPL-MCNC: 0.9 MG/DL — SIGNIFICANT CHANGE UP (ref 0.2–1.2)
BUN SERPL-MCNC: 44 MG/DL — HIGH (ref 7–23)
CALCIUM SERPL-MCNC: 9 MG/DL — SIGNIFICANT CHANGE UP (ref 8.4–10.5)
CHLORIDE SERPL-SCNC: 99 MMOL/L — SIGNIFICANT CHANGE UP (ref 96–108)
CO2 SERPL-SCNC: 24 MMOL/L — SIGNIFICANT CHANGE UP (ref 22–31)
CREAT SERPL-MCNC: 1.88 MG/DL — HIGH (ref 0.5–1.3)
EGFR: 40 ML/MIN/1.73M2 — LOW
EGFR: 40 ML/MIN/1.73M2 — LOW
GLUCOSE BLDC GLUCOMTR-MCNC: 176 MG/DL — HIGH (ref 70–99)
GLUCOSE BLDC GLUCOMTR-MCNC: 179 MG/DL — HIGH (ref 70–99)
GLUCOSE BLDC GLUCOMTR-MCNC: 185 MG/DL — HIGH (ref 70–99)
GLUCOSE BLDC GLUCOMTR-MCNC: 186 MG/DL — HIGH (ref 70–99)
GLUCOSE BLDC GLUCOMTR-MCNC: 235 MG/DL — HIGH (ref 70–99)
GLUCOSE BLDC GLUCOMTR-MCNC: 249 MG/DL — HIGH (ref 70–99)
GLUCOSE SERPL-MCNC: 165 MG/DL — HIGH (ref 70–99)
HCT VFR BLD CALC: 37.9 % — LOW (ref 39–50)
HGB BLD-MCNC: 10.7 G/DL — LOW (ref 13–17)
MAGNESIUM SERPL-MCNC: 2.5 MG/DL — SIGNIFICANT CHANGE UP (ref 1.6–2.6)
MCHC RBC-ENTMCNC: 21.4 PG — LOW (ref 27–34)
MCHC RBC-ENTMCNC: 28.2 G/DL — LOW (ref 32–36)
MCV RBC AUTO: 75.6 FL — LOW (ref 80–100)
NRBC # BLD: 0 /100 WBCS — SIGNIFICANT CHANGE UP (ref 0–0)
NRBC BLD-RTO: 0 /100 WBCS — SIGNIFICANT CHANGE UP (ref 0–0)
PHOSPHATE SERPL-MCNC: 3.9 MG/DL — SIGNIFICANT CHANGE UP (ref 2.5–4.5)
PLATELET # BLD AUTO: 101 K/UL — LOW (ref 150–400)
POTASSIUM SERPL-MCNC: 4 MMOL/L — SIGNIFICANT CHANGE UP (ref 3.5–5.3)
POTASSIUM SERPL-SCNC: 4 MMOL/L — SIGNIFICANT CHANGE UP (ref 3.5–5.3)
PROT SERPL-MCNC: 7.2 G/DL — SIGNIFICANT CHANGE UP (ref 6–8.3)
RBC # BLD: 5.01 M/UL — SIGNIFICANT CHANGE UP (ref 4.2–5.8)
RBC # FLD: 20.2 % — HIGH (ref 10.3–14.5)
SODIUM SERPL-SCNC: 139 MMOL/L — SIGNIFICANT CHANGE UP (ref 135–145)
WBC # BLD: 6.7 K/UL — SIGNIFICANT CHANGE UP (ref 3.8–10.5)
WBC # FLD AUTO: 6.7 K/UL — SIGNIFICANT CHANGE UP (ref 3.8–10.5)

## 2024-12-19 RX ORDER — INSULIN LISPRO 100 U/ML
4 INJECTION, SOLUTION INTRAVENOUS; SUBCUTANEOUS
Refills: 0 | Status: DISCONTINUED | OUTPATIENT
Start: 2024-12-20 | End: 2024-12-21

## 2024-12-19 RX ORDER — BUMETANIDE 1 MG/1
2 TABLET ORAL
Refills: 0 | Status: DISCONTINUED | OUTPATIENT
Start: 2024-12-19 | End: 2024-12-21

## 2024-12-19 RX ADMIN — BUMETANIDE 124 MILLIGRAM(S): 1 TABLET ORAL at 13:16

## 2024-12-19 RX ADMIN — SACUBITRIL AND VALSARTAN 1 TABLET(S): 6; 6 PELLET ORAL at 06:32

## 2024-12-19 RX ADMIN — METOPROLOL SUCCINATE 25 MILLIGRAM(S): 50 TABLET, EXTENDED RELEASE ORAL at 06:32

## 2024-12-19 RX ADMIN — Medication 1 DOSE(S): at 08:23

## 2024-12-19 RX ADMIN — ROSUVASTATIN CALCIUM 20 MILLIGRAM(S): 20 TABLET, FILM COATED ORAL at 21:44

## 2024-12-19 RX ADMIN — Medication 1 DOSE(S): at 21:43

## 2024-12-19 RX ADMIN — IPRATROPIUM BROMIDE AND ALBUTEROL SULFATE 3 MILLILITER(S): .5; 2.5 SOLUTION RESPIRATORY (INHALATION) at 17:24

## 2024-12-19 RX ADMIN — Medication 100 MILLIGRAM(S): at 06:32

## 2024-12-19 RX ADMIN — CLOPIDOGREL BISULFATE 75 MILLIGRAM(S): 75 TABLET, FILM COATED ORAL at 11:19

## 2024-12-19 RX ADMIN — IPRATROPIUM BROMIDE AND ALBUTEROL SULFATE 3 MILLILITER(S): .5; 2.5 SOLUTION RESPIRATORY (INHALATION) at 11:19

## 2024-12-19 RX ADMIN — INSULIN LISPRO 1: 100 INJECTION, SOLUTION INTRAVENOUS; SUBCUTANEOUS at 08:23

## 2024-12-19 RX ADMIN — INSULIN LISPRO 1: 100 INJECTION, SOLUTION INTRAVENOUS; SUBCUTANEOUS at 12:19

## 2024-12-19 RX ADMIN — Medication 100 MILLIGRAM(S): at 17:25

## 2024-12-19 RX ADMIN — INSULIN GLARGINE-YFGN 18 UNIT(S): 100 INJECTION, SOLUTION SUBCUTANEOUS at 21:46

## 2024-12-19 RX ADMIN — IPRATROPIUM BROMIDE AND ALBUTEROL SULFATE 3 MILLILITER(S): .5; 2.5 SOLUTION RESPIRATORY (INHALATION) at 21:46

## 2024-12-19 RX ADMIN — SACUBITRIL AND VALSARTAN 1 TABLET(S): 6; 6 PELLET ORAL at 17:24

## 2024-12-19 RX ADMIN — Medication 40 MILLIGRAM(S): at 06:32

## 2024-12-19 RX ADMIN — IPRATROPIUM BROMIDE AND ALBUTEROL SULFATE 3 MILLILITER(S): .5; 2.5 SOLUTION RESPIRATORY (INHALATION) at 06:33

## 2024-12-19 RX ADMIN — BUMETANIDE 124 MILLIGRAM(S): 1 TABLET ORAL at 06:33

## 2024-12-19 RX ADMIN — INSULIN LISPRO 1: 100 INJECTION, SOLUTION INTRAVENOUS; SUBCUTANEOUS at 18:20

## 2024-12-19 RX ADMIN — TAMSULOSIN HYDROCHLORIDE 0.4 MILLIGRAM(S): 0.4 CAPSULE ORAL at 21:44

## 2024-12-19 RX ADMIN — Medication 81 MILLIGRAM(S): at 11:18

## 2024-12-20 ENCOUNTER — TRANSCRIPTION ENCOUNTER (OUTPATIENT)
Age: 64
End: 2024-12-20

## 2024-12-20 ENCOUNTER — RESULT REVIEW (OUTPATIENT)
Age: 64
End: 2024-12-20

## 2024-12-20 DIAGNOSIS — J90 PLEURAL EFFUSION, NOT ELSEWHERE CLASSIFIED: ICD-10-CM

## 2024-12-20 LAB
ANION GAP SERPL CALC-SCNC: 15 MMOL/L — SIGNIFICANT CHANGE UP (ref 5–17)
B PERT IGG SER-ACNC: 5.33 INDEX — HIGH (ref 0–0.94)
B PERT IGM SER-ACNC: 1.2 INDEX — HIGH (ref 0–0.9)
BUN SERPL-MCNC: 41 MG/DL — HIGH (ref 7–23)
CALCIUM SERPL-MCNC: 9 MG/DL — SIGNIFICANT CHANGE UP (ref 8.4–10.5)
CHLORIDE SERPL-SCNC: 99 MMOL/L — SIGNIFICANT CHANGE UP (ref 96–108)
CO2 SERPL-SCNC: 25 MMOL/L — SIGNIFICANT CHANGE UP (ref 22–31)
CREAT SERPL-MCNC: 1.87 MG/DL — HIGH (ref 0.5–1.3)
EGFR: 40 ML/MIN/1.73M2 — LOW
EGFR: 40 ML/MIN/1.73M2 — LOW
GLUCOSE BLDC GLUCOMTR-MCNC: 148 MG/DL — HIGH (ref 70–99)
GLUCOSE BLDC GLUCOMTR-MCNC: 165 MG/DL — HIGH (ref 70–99)
GLUCOSE BLDC GLUCOMTR-MCNC: 183 MG/DL — HIGH (ref 70–99)
GLUCOSE SERPL-MCNC: 183 MG/DL — HIGH (ref 70–99)
HCT VFR BLD CALC: 37.4 % — LOW (ref 39–50)
HGB BLD-MCNC: 10.7 G/DL — LOW (ref 13–17)
MCHC RBC-ENTMCNC: 21.7 PG — LOW (ref 27–34)
MCHC RBC-ENTMCNC: 28.6 G/DL — LOW (ref 32–36)
MCV RBC AUTO: 75.7 FL — LOW (ref 80–100)
NRBC # BLD: 0 /100 WBCS — SIGNIFICANT CHANGE UP (ref 0–0)
NRBC BLD-RTO: 0 /100 WBCS — SIGNIFICANT CHANGE UP (ref 0–0)
PLATELET # BLD AUTO: 101 K/UL — LOW (ref 150–400)
POTASSIUM SERPL-MCNC: 3.8 MMOL/L — SIGNIFICANT CHANGE UP (ref 3.5–5.3)
POTASSIUM SERPL-SCNC: 3.8 MMOL/L — SIGNIFICANT CHANGE UP (ref 3.5–5.3)
RBC # BLD: 4.94 M/UL — SIGNIFICANT CHANGE UP (ref 4.2–5.8)
RBC # FLD: 20.1 % — HIGH (ref 10.3–14.5)
SODIUM SERPL-SCNC: 139 MMOL/L — SIGNIFICANT CHANGE UP (ref 135–145)
WBC # BLD: 6.71 K/UL — SIGNIFICANT CHANGE UP (ref 3.8–10.5)
WBC # FLD AUTO: 6.71 K/UL — SIGNIFICANT CHANGE UP (ref 3.8–10.5)

## 2024-12-20 RX ADMIN — Medication 40 MILLIGRAM(S): at 05:36

## 2024-12-20 RX ADMIN — INSULIN GLARGINE-YFGN 18 UNIT(S): 100 INJECTION, SOLUTION SUBCUTANEOUS at 21:37

## 2024-12-20 RX ADMIN — Medication 1 DOSE(S): at 08:12

## 2024-12-20 RX ADMIN — TAMSULOSIN HYDROCHLORIDE 0.4 MILLIGRAM(S): 0.4 CAPSULE ORAL at 21:29

## 2024-12-20 RX ADMIN — ROSUVASTATIN CALCIUM 20 MILLIGRAM(S): 20 TABLET, FILM COATED ORAL at 21:29

## 2024-12-20 RX ADMIN — Medication 100 MILLIGRAM(S): at 05:35

## 2024-12-20 RX ADMIN — Medication 100 MILLIGRAM(S): at 17:19

## 2024-12-20 RX ADMIN — IPRATROPIUM BROMIDE AND ALBUTEROL SULFATE 3 MILLILITER(S): .5; 2.5 SOLUTION RESPIRATORY (INHALATION) at 17:19

## 2024-12-20 RX ADMIN — Medication 1 DOSE(S): at 21:29

## 2024-12-20 RX ADMIN — Medication 81 MILLIGRAM(S): at 11:13

## 2024-12-20 RX ADMIN — CLOPIDOGREL BISULFATE 75 MILLIGRAM(S): 75 TABLET, FILM COATED ORAL at 11:13

## 2024-12-20 RX ADMIN — BUMETANIDE 2 MILLIGRAM(S): 1 TABLET ORAL at 05:36

## 2024-12-20 RX ADMIN — METOPROLOL SUCCINATE 25 MILLIGRAM(S): 50 TABLET, EXTENDED RELEASE ORAL at 05:35

## 2024-12-20 RX ADMIN — IPRATROPIUM BROMIDE AND ALBUTEROL SULFATE 3 MILLILITER(S): .5; 2.5 SOLUTION RESPIRATORY (INHALATION) at 05:37

## 2024-12-20 RX ADMIN — BUMETANIDE 2 MILLIGRAM(S): 1 TABLET ORAL at 15:32

## 2024-12-20 RX ADMIN — IPRATROPIUM BROMIDE AND ALBUTEROL SULFATE 3 MILLILITER(S): .5; 2.5 SOLUTION RESPIRATORY (INHALATION) at 11:13

## 2024-12-20 RX ADMIN — INSULIN LISPRO 1: 100 INJECTION, SOLUTION INTRAVENOUS; SUBCUTANEOUS at 17:14

## 2024-12-20 RX ADMIN — SACUBITRIL AND VALSARTAN 1 TABLET(S): 6; 6 PELLET ORAL at 05:36

## 2024-12-20 RX ADMIN — INSULIN LISPRO 4 UNIT(S): 100 INJECTION, SOLUTION INTRAVENOUS; SUBCUTANEOUS at 17:14

## 2024-12-20 RX ADMIN — INSULIN LISPRO 4 UNIT(S): 100 INJECTION, SOLUTION INTRAVENOUS; SUBCUTANEOUS at 08:12

## 2024-12-20 RX ADMIN — SACUBITRIL AND VALSARTAN 1 TABLET(S): 6; 6 PELLET ORAL at 17:19

## 2024-12-21 ENCOUNTER — TRANSCRIPTION ENCOUNTER (OUTPATIENT)
Age: 64
End: 2024-12-21

## 2024-12-21 VITALS
HEART RATE: 89 BPM | DIASTOLIC BLOOD PRESSURE: 70 MMHG | RESPIRATION RATE: 17 BRPM | OXYGEN SATURATION: 100 % | SYSTOLIC BLOOD PRESSURE: 104 MMHG | TEMPERATURE: 97 F

## 2024-12-21 LAB
ANION GAP SERPL CALC-SCNC: 15 MMOL/L — SIGNIFICANT CHANGE UP (ref 5–17)
BUN SERPL-MCNC: 44 MG/DL — HIGH (ref 7–23)
CALCIUM SERPL-MCNC: 8.8 MG/DL — SIGNIFICANT CHANGE UP (ref 8.4–10.5)
CHLORIDE SERPL-SCNC: 99 MMOL/L — SIGNIFICANT CHANGE UP (ref 96–108)
CO2 SERPL-SCNC: 24 MMOL/L — SIGNIFICANT CHANGE UP (ref 22–31)
CREAT SERPL-MCNC: 1.92 MG/DL — HIGH (ref 0.5–1.3)
EGFR: 39 ML/MIN/1.73M2 — LOW
EGFR: 39 ML/MIN/1.73M2 — LOW
GLUCOSE BLDC GLUCOMTR-MCNC: 165 MG/DL — HIGH (ref 70–99)
GLUCOSE SERPL-MCNC: 171 MG/DL — HIGH (ref 70–99)
HCT VFR BLD CALC: 36.8 % — LOW (ref 39–50)
HGB BLD-MCNC: 10.5 G/DL — LOW (ref 13–17)
MCHC RBC-ENTMCNC: 21.6 PG — LOW (ref 27–34)
MCHC RBC-ENTMCNC: 28.5 G/DL — LOW (ref 32–36)
MCV RBC AUTO: 75.7 FL — LOW (ref 80–100)
NRBC # BLD: 0 /100 WBCS — SIGNIFICANT CHANGE UP (ref 0–0)
NRBC BLD-RTO: 0 /100 WBCS — SIGNIFICANT CHANGE UP (ref 0–0)
PLATELET # BLD AUTO: 110 K/UL — LOW (ref 150–400)
POTASSIUM SERPL-MCNC: 4 MMOL/L — SIGNIFICANT CHANGE UP (ref 3.5–5.3)
POTASSIUM SERPL-SCNC: 4 MMOL/L — SIGNIFICANT CHANGE UP (ref 3.5–5.3)
RBC # BLD: 4.86 M/UL — SIGNIFICANT CHANGE UP (ref 4.2–5.8)
RBC # FLD: 19.8 % — HIGH (ref 10.3–14.5)
SODIUM SERPL-SCNC: 138 MMOL/L — SIGNIFICANT CHANGE UP (ref 135–145)
WBC # BLD: 7.14 K/UL — SIGNIFICANT CHANGE UP (ref 3.8–10.5)
WBC # FLD AUTO: 7.14 K/UL — SIGNIFICANT CHANGE UP (ref 3.8–10.5)

## 2024-12-21 RX ORDER — CLOTRIMAZOLE 1 G/100G
1 CREAM TOPICAL
Refills: 0 | Status: DISCONTINUED | OUTPATIENT
Start: 2024-12-21 | End: 2024-12-21

## 2024-12-21 RX ORDER — DOXYCYCLINE HYCLATE 100 MG
2 TABLET ORAL
Qty: 8 | Refills: 0
Start: 2024-12-21 | End: 2024-12-22

## 2024-12-21 RX ORDER — DOXYCYCLINE HYCLATE 100 MG
1 TABLET ORAL
Refills: 0 | DISCHARGE

## 2024-12-21 RX ORDER — CLOTRIMAZOLE 1 G/100G
1 CREAM TOPICAL
Qty: 1 | Refills: 0
Start: 2024-12-21 | End: 2025-01-03

## 2024-12-21 RX ORDER — METHYLPREDNISOLONE ACETATE 80 MG/ML
1 INJECTION, SUSPENSION INTRA-ARTICULAR; INTRALESIONAL; INTRAMUSCULAR; SOFT TISSUE
Qty: 1 | Refills: 0
Start: 2024-12-21 | End: 2024-12-27

## 2024-12-21 RX ORDER — HYDROCODONE/HOMATROPINE
5 SYRUP ORAL
Qty: 25 | Refills: 0
Start: 2024-12-21 | End: 2024-12-25

## 2024-12-21 RX ADMIN — INSULIN LISPRO 1: 100 INJECTION, SOLUTION INTRAVENOUS; SUBCUTANEOUS at 08:33

## 2024-12-21 RX ADMIN — Medication 81 MILLIGRAM(S): at 11:29

## 2024-12-21 RX ADMIN — Medication 100 MILLIGRAM(S): at 05:11

## 2024-12-21 RX ADMIN — METOPROLOL SUCCINATE 25 MILLIGRAM(S): 50 TABLET, EXTENDED RELEASE ORAL at 05:11

## 2024-12-21 RX ADMIN — CLOPIDOGREL BISULFATE 75 MILLIGRAM(S): 75 TABLET, FILM COATED ORAL at 11:29

## 2024-12-21 RX ADMIN — IPRATROPIUM BROMIDE AND ALBUTEROL SULFATE 3 MILLILITER(S): .5; 2.5 SOLUTION RESPIRATORY (INHALATION) at 11:29

## 2024-12-21 RX ADMIN — BUMETANIDE 2 MILLIGRAM(S): 1 TABLET ORAL at 05:11

## 2024-12-21 RX ADMIN — Medication 40 MILLIGRAM(S): at 05:11

## 2024-12-21 RX ADMIN — IPRATROPIUM BROMIDE AND ALBUTEROL SULFATE 3 MILLILITER(S): .5; 2.5 SOLUTION RESPIRATORY (INHALATION) at 05:10

## 2024-12-21 RX ADMIN — SACUBITRIL AND VALSARTAN 1 TABLET(S): 6; 6 PELLET ORAL at 05:11

## 2024-12-21 RX ADMIN — Medication 1 DOSE(S): at 08:34

## 2024-12-21 RX ADMIN — INSULIN LISPRO 4 UNIT(S): 100 INJECTION, SOLUTION INTRAVENOUS; SUBCUTANEOUS at 08:33

## 2025-01-16 NOTE — ED PROVIDER NOTE - PRINCIPAL DIAGNOSIS
Discussed with patient need for cardiology clearance. Discussed she can also follow up for appointment directly d/t to being already established in their clinic. She stated she has followed up and was told to follow up on Friday again for appointment availability. Patient received another phone call in the middle of our conversation and she stated she will call back. Awaiting patient's returned call.    Pulmonary edema

## 2025-01-21 ENCOUNTER — APPOINTMENT (OUTPATIENT)
Dept: ELECTROPHYSIOLOGY | Facility: CLINIC | Age: 65
End: 2025-01-21

## 2025-01-21 PROCEDURE — 93295 DEV INTERROG REMOTE 1/2/MLT: CPT

## 2025-01-21 PROCEDURE — 93296 REM INTERROG EVL PM/IDS: CPT

## 2025-03-19 ENCOUNTER — INPATIENT (INPATIENT)
Facility: HOSPITAL | Age: 65
LOS: 3 days | Discharge: HOME HEALTH | DRG: 293 | End: 2025-03-23
Attending: INTERNAL MEDICINE | Admitting: INTERNAL MEDICINE
Payer: MEDICARE

## 2025-03-19 VITALS
HEART RATE: 85 BPM | DIASTOLIC BLOOD PRESSURE: 58 MMHG | RESPIRATION RATE: 22 BRPM | HEIGHT: 77 IN | OXYGEN SATURATION: 94 % | WEIGHT: 240.08 LBS | SYSTOLIC BLOOD PRESSURE: 86 MMHG

## 2025-03-19 DIAGNOSIS — E11.65 TYPE 2 DIABETES MELLITUS WITH HYPERGLYCEMIA: ICD-10-CM

## 2025-03-19 DIAGNOSIS — J44.1 CHRONIC OBSTRUCTIVE PULMONARY DISEASE WITH (ACUTE) EXACERBATION: ICD-10-CM

## 2025-03-19 DIAGNOSIS — N17.9 ACUTE KIDNEY FAILURE, UNSPECIFIED: ICD-10-CM

## 2025-03-19 DIAGNOSIS — I50.22 CHRONIC SYSTOLIC (CONGESTIVE) HEART FAILURE: ICD-10-CM

## 2025-03-19 DIAGNOSIS — Z29.9 ENCOUNTER FOR PROPHYLACTIC MEASURES, UNSPECIFIED: ICD-10-CM

## 2025-03-19 DIAGNOSIS — I50.23 ACUTE ON CHRONIC SYSTOLIC (CONGESTIVE) HEART FAILURE: ICD-10-CM

## 2025-03-19 DIAGNOSIS — I25.10 ATHEROSCLEROTIC HEART DISEASE OF NATIVE CORONARY ARTERY WITHOUT ANGINA PECTORIS: ICD-10-CM

## 2025-03-19 DIAGNOSIS — K21.9 GASTRO-ESOPHAGEAL REFLUX DISEASE WITHOUT ESOPHAGITIS: ICD-10-CM

## 2025-03-19 DIAGNOSIS — Z98.52 VASECTOMY STATUS: Chronic | ICD-10-CM

## 2025-03-19 DIAGNOSIS — N40.0 BENIGN PROSTATIC HYPERPLASIA WITHOUT LOWER URINARY TRACT SYMPTOMS: ICD-10-CM

## 2025-03-19 DIAGNOSIS — I95.9 HYPOTENSION, UNSPECIFIED: ICD-10-CM

## 2025-03-19 LAB
ALBUMIN SERPL ELPH-MCNC: 4 G/DL — SIGNIFICANT CHANGE UP (ref 3.3–5)
ALP SERPL-CCNC: 167 U/L — HIGH (ref 40–120)
ALT FLD-CCNC: 13 U/L — SIGNIFICANT CHANGE UP (ref 10–45)
ANION GAP SERPL CALC-SCNC: 23 MMOL/L — HIGH (ref 5–17)
ANISOCYTOSIS BLD QL: SLIGHT — SIGNIFICANT CHANGE UP
APTT BLD: 33.7 SEC — SIGNIFICANT CHANGE UP (ref 24.5–35.6)
AST SERPL-CCNC: 17 U/L — SIGNIFICANT CHANGE UP (ref 10–40)
BASOPHILS # BLD AUTO: 0.08 K/UL — SIGNIFICANT CHANGE UP (ref 0–0.2)
BASOPHILS NFR BLD AUTO: 0.9 % — SIGNIFICANT CHANGE UP (ref 0–2)
BILIRUB SERPL-MCNC: 0.9 MG/DL — SIGNIFICANT CHANGE UP (ref 0.2–1.2)
BUN SERPL-MCNC: 97 MG/DL — HIGH (ref 7–23)
CALCIUM SERPL-MCNC: 9.9 MG/DL — SIGNIFICANT CHANGE UP (ref 8.4–10.5)
CHLORIDE SERPL-SCNC: 81 MMOL/L — LOW (ref 96–108)
CO2 SERPL-SCNC: 30 MMOL/L — SIGNIFICANT CHANGE UP (ref 22–31)
CREAT SERPL-MCNC: 3.18 MG/DL — HIGH (ref 0.5–1.3)
EGFR: 21 ML/MIN/1.73M2 — LOW
EGFR: 21 ML/MIN/1.73M2 — LOW
ELLIPTOCYTES BLD QL SMEAR: SLIGHT — SIGNIFICANT CHANGE UP
EOSINOPHIL # BLD AUTO: 0.23 K/UL — SIGNIFICANT CHANGE UP (ref 0–0.5)
EOSINOPHIL NFR BLD AUTO: 2.7 % — SIGNIFICANT CHANGE UP (ref 0–6)
FLUAV AG NPH QL: SIGNIFICANT CHANGE UP
FLUBV AG NPH QL: SIGNIFICANT CHANGE UP
GAS PNL BLDV: SIGNIFICANT CHANGE UP
GLUCOSE BLDC GLUCOMTR-MCNC: 274 MG/DL — HIGH (ref 70–99)
GLUCOSE BLDC GLUCOMTR-MCNC: 342 MG/DL — HIGH (ref 70–99)
GLUCOSE SERPL-MCNC: 281 MG/DL — HIGH (ref 70–99)
HCT VFR BLD CALC: 44.2 % — SIGNIFICANT CHANGE UP (ref 39–50)
HGB BLD-MCNC: 13.3 G/DL — SIGNIFICANT CHANGE UP (ref 13–17)
INR BLD: 1.15 RATIO — SIGNIFICANT CHANGE UP (ref 0.85–1.16)
LYMPHOCYTES # BLD AUTO: 1.53 K/UL — SIGNIFICANT CHANGE UP (ref 1–3.3)
LYMPHOCYTES # BLD AUTO: 18 % — SIGNIFICANT CHANGE UP (ref 13–44)
MANUAL SMEAR VERIFICATION: SIGNIFICANT CHANGE UP
MCHC RBC-ENTMCNC: 21.5 PG — LOW (ref 27–34)
MCHC RBC-ENTMCNC: 30.1 G/DL — LOW (ref 32–36)
MCV RBC AUTO: 71.5 FL — LOW (ref 80–100)
MICROCYTES BLD QL: SIGNIFICANT CHANGE UP
MONOCYTES # BLD AUTO: 0.92 K/UL — HIGH (ref 0–0.9)
MONOCYTES NFR BLD AUTO: 10.8 % — SIGNIFICANT CHANGE UP (ref 2–14)
NEUTROPHILS # BLD AUTO: 5.6 K/UL — SIGNIFICANT CHANGE UP (ref 1.8–7.4)
NEUTROPHILS NFR BLD AUTO: 64.9 % — SIGNIFICANT CHANGE UP (ref 43–77)
NEUTS BAND # BLD: 0.9 % — SIGNIFICANT CHANGE UP (ref 0–8)
NEUTS BAND NFR BLD: 0.9 % — SIGNIFICANT CHANGE UP (ref 0–8)
PLAT MORPH BLD: NORMAL — SIGNIFICANT CHANGE UP
PLATELET # BLD AUTO: 199 K/UL — SIGNIFICANT CHANGE UP (ref 150–400)
POIKILOCYTOSIS BLD QL AUTO: SLIGHT — SIGNIFICANT CHANGE UP
POTASSIUM SERPL-MCNC: 3.6 MMOL/L — SIGNIFICANT CHANGE UP (ref 3.5–5.3)
POTASSIUM SERPL-SCNC: 3.6 MMOL/L — SIGNIFICANT CHANGE UP (ref 3.5–5.3)
PROT SERPL-MCNC: 8.3 G/DL — SIGNIFICANT CHANGE UP (ref 6–8.3)
PROTHROM AB SERPL-ACNC: 13.2 SEC — SIGNIFICANT CHANGE UP (ref 9.9–13.4)
RBC # BLD: 6.18 M/UL — HIGH (ref 4.2–5.8)
RBC # FLD: 19 % — HIGH (ref 10.3–14.5)
RBC BLD AUTO: ABNORMAL
RSV RNA NPH QL NAA+NON-PROBE: SIGNIFICANT CHANGE UP
SARS-COV-2 RNA SPEC QL NAA+PROBE: SIGNIFICANT CHANGE UP
SODIUM SERPL-SCNC: 134 MMOL/L — LOW (ref 135–145)
SOURCE RESPIRATORY: SIGNIFICANT CHANGE UP
TROPONIN T, HIGH SENSITIVITY RESULT: 158 NG/L — HIGH (ref 0–51)
TROPONIN T, HIGH SENSITIVITY RESULT: 171 NG/L — HIGH (ref 0–51)
VARIANT LYMPHS # BLD: 1.8 % — SIGNIFICANT CHANGE UP (ref 0–6)
VARIANT LYMPHS NFR BLD MANUAL: 1.8 % — SIGNIFICANT CHANGE UP (ref 0–6)
WBC # BLD: 8.51 K/UL — SIGNIFICANT CHANGE UP (ref 3.8–10.5)
WBC # FLD AUTO: 8.51 K/UL — SIGNIFICANT CHANGE UP (ref 3.8–10.5)

## 2025-03-19 PROCEDURE — 71045 X-RAY EXAM CHEST 1 VIEW: CPT | Mod: 26

## 2025-03-19 PROCEDURE — 99223 1ST HOSP IP/OBS HIGH 75: CPT

## 2025-03-19 PROCEDURE — 99291 CRITICAL CARE FIRST HOUR: CPT

## 2025-03-19 PROCEDURE — 93308 TTE F-UP OR LMTD: CPT | Mod: 26

## 2025-03-19 PROCEDURE — 93010 ELECTROCARDIOGRAM REPORT: CPT

## 2025-03-19 RX ORDER — IPRATROPIUM BROMIDE AND ALBUTEROL SULFATE .5; 2.5 MG/3ML; MG/3ML
3 SOLUTION RESPIRATORY (INHALATION) EVERY 8 HOURS
Refills: 0 | Status: DISCONTINUED | OUTPATIENT
Start: 2025-03-19 | End: 2025-03-23

## 2025-03-19 RX ORDER — INSULIN LISPRO 100 U/ML
INJECTION, SOLUTION INTRAVENOUS; SUBCUTANEOUS
Refills: 0 | Status: DISCONTINUED | OUTPATIENT
Start: 2025-03-19 | End: 2025-03-23

## 2025-03-19 RX ORDER — INSULIN GLARGINE-YFGN 100 [IU]/ML
40 INJECTION, SOLUTION SUBCUTANEOUS AT BEDTIME
Refills: 0 | Status: DISCONTINUED | OUTPATIENT
Start: 2025-03-19 | End: 2025-03-19

## 2025-03-19 RX ORDER — DEXTROSE 50 % IN WATER 50 %
25 SYRINGE (ML) INTRAVENOUS ONCE
Refills: 0 | Status: DISCONTINUED | OUTPATIENT
Start: 2025-03-19 | End: 2025-03-23

## 2025-03-19 RX ORDER — INSULIN LISPRO 100 U/ML
8 INJECTION, SOLUTION INTRAVENOUS; SUBCUTANEOUS
Refills: 0 | Status: DISCONTINUED | OUTPATIENT
Start: 2025-03-20 | End: 2025-03-23

## 2025-03-19 RX ORDER — GLUCAGON 3 MG/1
1 POWDER NASAL ONCE
Refills: 0 | Status: DISCONTINUED | OUTPATIENT
Start: 2025-03-19 | End: 2025-03-23

## 2025-03-19 RX ORDER — INSULIN LISPRO 100 U/ML
8 INJECTION, SOLUTION INTRAVENOUS; SUBCUTANEOUS
Refills: 0 | Status: DISCONTINUED | OUTPATIENT
Start: 2025-03-19 | End: 2025-03-23

## 2025-03-19 RX ORDER — SODIUM CHLORIDE 9 G/1000ML
250 INJECTION, SOLUTION INTRAVENOUS
Refills: 0 | Status: COMPLETED | OUTPATIENT
Start: 2025-03-19 | End: 2025-03-19

## 2025-03-19 RX ORDER — METOPROLOL SUCCINATE 50 MG/1
25 TABLET, EXTENDED RELEASE ORAL DAILY
Refills: 0 | Status: DISCONTINUED | OUTPATIENT
Start: 2025-03-19 | End: 2025-03-23

## 2025-03-19 RX ORDER — CLOPIDOGREL BISULFATE 75 MG/1
75 TABLET, FILM COATED ORAL DAILY
Refills: 0 | Status: DISCONTINUED | OUTPATIENT
Start: 2025-03-19 | End: 2025-03-23

## 2025-03-19 RX ORDER — ASPIRIN 325 MG
81 TABLET ORAL DAILY
Refills: 0 | Status: DISCONTINUED | OUTPATIENT
Start: 2025-03-19 | End: 2025-03-23

## 2025-03-19 RX ORDER — ROSUVASTATIN CALCIUM 5 MG/1
10 TABLET, FILM COATED ORAL AT BEDTIME
Refills: 0 | Status: DISCONTINUED | OUTPATIENT
Start: 2025-03-19 | End: 2025-03-23

## 2025-03-19 RX ORDER — SODIUM CHLORIDE 9 G/1000ML
1000 INJECTION, SOLUTION INTRAVENOUS
Refills: 0 | Status: DISCONTINUED | OUTPATIENT
Start: 2025-03-19 | End: 2025-03-23

## 2025-03-19 RX ORDER — ROSUVASTATIN CALCIUM 5 MG/1
1 TABLET, FILM COATED ORAL
Refills: 0 | DISCHARGE

## 2025-03-19 RX ORDER — ACETAMINOPHEN 500 MG/5ML
650 LIQUID (ML) ORAL EVERY 6 HOURS
Refills: 0 | Status: DISCONTINUED | OUTPATIENT
Start: 2025-03-19 | End: 2025-03-23

## 2025-03-19 RX ORDER — MELATONIN 5 MG
3 TABLET ORAL AT BEDTIME
Refills: 0 | Status: DISCONTINUED | OUTPATIENT
Start: 2025-03-19 | End: 2025-03-23

## 2025-03-19 RX ORDER — INSULIN LISPRO 100 U/ML
INJECTION, SOLUTION INTRAVENOUS; SUBCUTANEOUS
Refills: 0 | Status: DISCONTINUED | OUTPATIENT
Start: 2025-03-19 | End: 2025-03-19

## 2025-03-19 RX ORDER — INSULIN GLARGINE-YFGN 100 [IU]/ML
20 INJECTION, SOLUTION SUBCUTANEOUS AT BEDTIME
Refills: 0 | Status: DISCONTINUED | OUTPATIENT
Start: 2025-03-19 | End: 2025-03-23

## 2025-03-19 RX ORDER — TAMSULOSIN HYDROCHLORIDE 0.4 MG/1
0.4 CAPSULE ORAL AT BEDTIME
Refills: 0 | Status: DISCONTINUED | OUTPATIENT
Start: 2025-03-19 | End: 2025-03-23

## 2025-03-19 RX ORDER — INSULIN GLARGINE-YFGN 100 [IU]/ML
30 INJECTION, SOLUTION SUBCUTANEOUS AT BEDTIME
Refills: 0 | Status: DISCONTINUED | OUTPATIENT
Start: 2025-03-19 | End: 2025-03-19

## 2025-03-19 RX ORDER — SPIRONOLACTONE 25 MG
1 TABLET ORAL
Refills: 0 | DISCHARGE

## 2025-03-19 RX ORDER — DEXTROSE 50 % IN WATER 50 %
15 SYRINGE (ML) INTRAVENOUS ONCE
Refills: 0 | Status: DISCONTINUED | OUTPATIENT
Start: 2025-03-19 | End: 2025-03-23

## 2025-03-19 RX ORDER — ALBUMIN (HUMAN) 12.5 G/50ML
250 INJECTION, SOLUTION INTRAVENOUS ONCE
Refills: 0 | Status: COMPLETED | OUTPATIENT
Start: 2025-03-19 | End: 2025-03-19

## 2025-03-19 RX ORDER — INSULIN LISPRO 100 U/ML
INJECTION, SOLUTION INTRAVENOUS; SUBCUTANEOUS AT BEDTIME
Refills: 0 | Status: DISCONTINUED | OUTPATIENT
Start: 2025-03-19 | End: 2025-03-23

## 2025-03-19 RX ADMIN — SODIUM CHLORIDE 250 MILLILITER(S): 9 INJECTION, SOLUTION INTRAVENOUS at 16:26

## 2025-03-19 RX ADMIN — IPRATROPIUM BROMIDE AND ALBUTEROL SULFATE 3 MILLILITER(S): .5; 2.5 SOLUTION RESPIRATORY (INHALATION) at 21:48

## 2025-03-19 RX ADMIN — INSULIN LISPRO 4: 100 INJECTION, SOLUTION INTRAVENOUS; SUBCUTANEOUS at 18:03

## 2025-03-19 RX ADMIN — ROSUVASTATIN CALCIUM 10 MILLIGRAM(S): 5 TABLET, FILM COATED ORAL at 21:28

## 2025-03-19 RX ADMIN — TAMSULOSIN HYDROCHLORIDE 0.4 MILLIGRAM(S): 0.4 CAPSULE ORAL at 21:28

## 2025-03-19 RX ADMIN — Medication 650 MILLIGRAM(S): at 22:00

## 2025-03-19 RX ADMIN — INSULIN LISPRO 1: 100 INJECTION, SOLUTION INTRAVENOUS; SUBCUTANEOUS at 21:47

## 2025-03-19 RX ADMIN — IPRATROPIUM BROMIDE AND ALBUTEROL SULFATE 3 MILLILITER(S): .5; 2.5 SOLUTION RESPIRATORY (INHALATION) at 18:02

## 2025-03-19 RX ADMIN — Medication 6 UNIT(S): at 16:26

## 2025-03-19 RX ADMIN — SODIUM CHLORIDE 250 MILLILITER(S): 9 INJECTION, SOLUTION INTRAVENOUS at 13:58

## 2025-03-19 RX ADMIN — Medication 3 MILLIGRAM(S): at 21:28

## 2025-03-19 RX ADMIN — Medication 650 MILLIGRAM(S): at 21:27

## 2025-03-19 RX ADMIN — ALBUMIN (HUMAN) 125 MILLILITER(S): 12.5 INJECTION, SOLUTION INTRAVENOUS at 12:50

## 2025-03-19 RX ADMIN — INSULIN GLARGINE-YFGN 20 UNIT(S): 100 INJECTION, SOLUTION SUBCUTANEOUS at 21:45

## 2025-03-19 RX ADMIN — INSULIN LISPRO 8 UNIT(S): 100 INJECTION, SOLUTION INTRAVENOUS; SUBCUTANEOUS at 18:02

## 2025-03-19 RX ADMIN — Medication 1 DOSE(S): at 18:02

## 2025-03-19 NOTE — H&P ADULT - PROBLEM SELECTOR PLAN 3
mild exacerbation likely due to viral infection   afebrile  s/p zpak as outpatient, monitor off further abx for now   sating well on RA  nebs Q8 atc  will hold steroids for now mild exacerbation likely due to viral infection - slightly improved per patient  afebrile  s/p zpak as outpatient, monitor off further abx for now   sating well on RA  nebs Q8 atc  will hold steroids for now

## 2025-03-19 NOTE — ED PROVIDER NOTE - CLINICAL SUMMARY MEDICAL DECISION MAKING FREE TEXT BOX
64-year-old male patient past medical history of Heart Failure with reduced EF% 20 (most recent echo Sept 2024), essential HTN, CKD3, type 2 DM on high doses of bedtime and on fluctuating preprandial insulin (past episode of hypoglycemia), CAD with past PCI on ASA and Plavix, LEFT BKA in [facility] in May 2024, last discharge from [place] Sept 2024 who was brought in via EMS for hypotension.  Patient was evaluated at his PCPs office when he was found systolic in the 80s.  The past 2 days the patient has been experiencing lethargy and fatigue.  Admits to sick contacts at home with croup exposure.  Denies any fevers, chest pain, shortness of breath, right leg edema.  Does admit to dry cough.  On exam, found mentating AAOx3 with BP 90s to 100 systolic.  Endorses normal appetite.  Prior to today patient has been compliant with his home medication and diuretics.  Hypertension possibly from over diuresis at home versus infectious etiology.  Will draw labs, EKG, will do POCUS.  Will assess response to albumin IV.  Will likely keep in the hospital for hypotension.

## 2025-03-19 NOTE — H&P ADULT - PROBLEM SELECTOR PLAN 1
likely due to overdiuresis  s/p 500cc LR in ED   will hold bumex and metolazone for now but will likely need to restart in am - he still has RLE edema  cough suspect due to viral infection   monitor vitals [id number] for now likely due to overdiuresis - recently started on metolazone which he took for ~8 days in addition to bumex 4mg bid.   s/p 500cc LR in ED   will hold bumex and metolazone for now but will likely need to restart in am - he still has RLE edema  cough suspect due to viral infection   monitor vitals [id number] for now

## 2025-03-19 NOTE — CONSULT NOTE ADULT - SUBJECTIVE AND OBJECTIVE BOX
DATE OF SERVICE: 03-19-25 @ 13:02    CHIEF COMPLAINT:Patient is a 64y old  Male who presents with a chief complaint of hypotension    HISTORY OF PRESENT ILLNESS:   64-year-old male patient past medical history of Heart Failure with reduced EF% 20 (most recent echo Sept 2024), essential HTN, CKD3, type 2 DM on high doses of bedtime and on fluctuating preprandial insulin (past episode of hypoglycemia), CAD with past PCI on ASA and Plavix, LEFT BKA in [facility] in May 2024, last discharge from [place] Sept 2024 who was brought in via EMS for hypotension.  Patient was evaluated at his PCPs office when he was found systolic in the 80s.  The past 2 days the patient has been experiencing lethargy and fatigue. Denies any fevers, chest pain, shortness of breath, right leg edema.  Does admit to dry cough.  On exam, found mentating AAOx3 with BP 90s to 100 systolic.  Endorses normal appetite.  Prior to today patient has been compliant with his home medication and diuretics.  Hypertension possibly from over diuresis at home versus infectious etiology.          PAST MEDICAL & SURGICAL HISTORY:  Stented coronary artery      Diabetes      AICD (automatic cardioverter/defibrillator) present      Hypertension      Heart failure with reduced ejection fraction      History of ischemic cardiomyopathy      History of COPD      H/O gastroesophageal reflux (GERD)      2019 novel coronavirus disease (COVID-19)      COVID-19 vaccine series completed      H/O vasectomy  20 yrs ago (2000)              MEDICATIONS:    	hydrocodone-homatropine 5 mg-1.5 mg/5 mL oral syrup: 5 milliliter(s) orally once a day (at bedtime) as needed for Cough MDD: 5ml  · 	doxycycline monohydrate 50 mg oral capsule: 2 cap(s) orally every 12 hours  · 	Medrol Dosepak 4 mg oral tablet: 1 tablet with sensor orally once a day follow package instruction  · 	clotrimazole 1% topical cream: Apply topically to affected area 2 times a day to right foot  · 	aspirin 81 mg oral delayed release tablet: 1 tab(s) orally once a day in the morning  · 	metoprolol succinate 25 mg oral capsule, extended release: 1 cap(s) orally once a day (at bedtime)  · 	tamsulosin 0.4 mg oral capsule: 1 cap(s) orally once a day (at bedtime)  · 	pantoprazole 40 mg oral delayed release tablet: 1 tab(s) orally once a day in the morning  · 	Advair Diskus 250 mcg-50 mcg inhalation powder: 1 inhaled 2 times a day  · 	Jardiance 10 mg oral tablet: 1 tab(s) orally once a day in the morning  · 	bumetanide 2 mg oral tablet: 2 tab(s) orally 2 times a day (in the morning and in the afternoon)  · 	Entresto 24 mg-26 mg oral tablet: 1 tab(s) orally 2 times a day  · 	Albuterol (Eqv-ProAir HFA) 90 mcg/inh inhalation aerosol: 2 puff(s) inhaled every 6 hours as needed  · 	rosuvastatin 20 mg oral tablet: 1 tab(s) orally once a day (at bedtime)  · 	Lantus 100 units/mL subcutaneous solution: 40 unit(s) subcutaneous once a day (at bedtime)  · 	HumaLOG 100 units/mL injectable solution: 20 unit(s) subcutaneous 3 times a day (before meals)  · 	clopidogrel 75 mg oral tablet: 1 tab(s) orally once a day in the morning        FAMILY HISTORY:  Family history of COPD (chronic obstructive pulmonary disease) (Sibling)    Family history of cardiac disorder  Paternal        Non-contributory    SOCIAL HISTORY:    [ ] Tobacco  [ ] Drugs  [ ] Alcohol    Allergies    doxepin (Other)  Zosyn (Pruritus)  ceftriaxone (Rash)  vancomycin (Rash (Mild to Mod))  penicillins (Short breath; Rash)    Intolerances    	    REVIEW OF SYSTEMS:  CONSTITUTIONAL: No fever  EYES: No eye pain, visual disturbances, or discharge  ENMT:  No difficulty hearing, tinnitus  NECK: No pain or stiffness  RESPIRATORY: No cough, wheezing,  CARDIOVASCULAR: No chest pain, palpitations, passing out, dizziness, or leg swelling  GASTROINTESTINAL:  No nausea, vomiting, diarrhea or constipation. No melena.  GENITOURINARY: No dysuria, hematuria  NEUROLOGICAL: No stroke like symptoms  SKIN: No burning or lesions   ENDOCRINE: No heat or cold intolerance  MUSCULOSKELETAL: No joint pain or swelling  PSYCHIATRIC: No  anxiety, mood swings  HEME/LYMPH: No bleeding gums  ALLERGY AND IMMUNOLOGIC: No hives or eczema	    All other ROS negative    PHYSICAL EXAM:  T(C): 36.7 (03-19-25 @ 12:05), Max: 36.7 (03-19-25 @ 12:05)  HR: 92 (03-19-25 @ 12:30) (85 - 92)  BP: 98/60 (03-19-25 @ 12:30) (86/58 - 100/58)  RR: 18 (03-19-25 @ 12:30) (18 - 22)  SpO2: 94% (03-19-25 @ 12:30) (94% - 95%)  Wt(kg): --  I&O's Summary      Appearance: Normal	  HEENT:   Normal oral mucosa, EOMI	  Cardiovascular:  S1 S2, No JVD,    Respiratory: Lungs clear to auscultation	  Psychiatry: Alert  Gastrointestinal:  Soft, Non-tender, + BS	  Skin: No rashes   Neurologic: Non-focal  Extremities:  No edema  Vascular: Peripheral pulses palpable    	    	  	  CARDIAC MARKERS:  Labs personally reviewed by me                                  13.3   8.51  )-----------( 199      ( 19 Mar 2025 12:11 )             44.2     03-19    134[L]  |  81[L]  |  97[H]  ----------------------------<  281[H]  3.6   |  30  |  3.18[H]    Ca    9.9      19 Mar 2025 12:11    TPro  8.3  /  Alb  4.0  /  TBili  0.9  /  DBili  x   /  AST  17  /  ALT  13  /  AlkPhos  167[H]  03-19          EKG: Personally reviewed by me -   Radiology: Personally reviewed by me -       Assessment /Plan:       Differential diagnosis and plan of care discussed with patient after the evaluation. Counseling on diet, nutritional counseling, weight management, exercise and medication compliance was done.   Advanced care planning/advanced directives discussed with patient/family. DNR status including forceful chest compressions to attempt to restart the heart, ventilator support/artificial breathing, electric shock, artificial nutrition, health care proxy, Molst form all discussed with pt. Pt wishes to consider. More than fifteen minutes spent on discussing advanced directives.       Shawn Barnes DO [facility]  Cardiovascular Medicine  [address] [place] [address] Dr, [address]  Available for call or text via [employer] TE[employer]  Office [phone]   DATE OF SERVICE: 03-19-25 @ 13:02    CHIEF COMPLAINT:Patient is a 64y old  Male who presents with a chief complaint of hypotension    HISTORY OF PRESENT ILLNESS:   64-year-old male patient past medical history of Heart Failure with reduced EF% 20 (most recent echo Sept 2024), essential HTN, CKD3, type 2 DM on high doses of bedtime and on fluctuating preprandial insulin (past episode of hypoglycemia), CAD with past PCI on ASA and Plavix, LEFT BKA in [facility] in May 2024, last discharge from [place] Sept 2024 who was brought in via EMS for hypotension.  Patient was evaluated at his PCPs office when he was found systolic in the 80s.  The past 2 days the patient has been experiencing lethargy and fatigue. Denies any fevers, chest pain, shortness of breath, right leg edema.  Does admit to dry cough.  On exam, found mentating AAOx3 with BP 90s to 100 systolic.  Endorses normal appetite.  Prior to today patient has been compliant with his home medication and diuretics.  Hypertension possibly from over diuresis at home versus infectious etiology.          PAST MEDICAL & SURGICAL HISTORY:  Stented coronary artery      Diabetes      AICD (automatic cardioverter/defibrillator) present      Hypertension      Heart failure with reduced ejection fraction      History of ischemic cardiomyopathy      History of COPD      H/O gastroesophageal reflux (GERD)      2019 novel coronavirus disease (COVID-19)      COVID-19 vaccine series completed      H/O vasectomy  20 yrs ago (2000)              MEDICATIONS:    	hydrocodone-homatropine 5 mg-1.5 mg/5 mL oral syrup: 5 milliliter(s) orally once a day (at bedtime) as needed for Cough MDD: 5ml  · 	doxycycline monohydrate 50 mg oral capsule: 2 cap(s) orally every 12 hours  · 	Medrol Dosepak 4 mg oral tablet: 1 tablet with sensor orally once a day follow package instruction  · 	clotrimazole 1% topical cream: Apply topically to affected area 2 times a day to right foot  · 	aspirin 81 mg oral delayed release tablet: 1 tab(s) orally once a day in the morning  · 	metoprolol succinate 25 mg oral capsule, extended release: 1 cap(s) orally once a day (at bedtime)  · 	tamsulosin 0.4 mg oral capsule: 1 cap(s) orally once a day (at bedtime)  · 	pantoprazole 40 mg oral delayed release tablet: 1 tab(s) orally once a day in the morning  · 	Advair Diskus 250 mcg-50 mcg inhalation powder: 1 inhaled 2 times a day  · 	Jardiance 10 mg oral tablet: 1 tab(s) orally once a day in the morning  · 	bumetanide 2 mg oral tablet: 2 tab(s) orally 2 times a day (in the morning and in the afternoon)  · 	Entresto 24 mg-26 mg oral tablet: 1 tab(s) orally 2 times a day  · 	Albuterol (Eqv-ProAir HFA) 90 mcg/inh inhalation aerosol: 2 puff(s) inhaled every 6 hours as needed  · 	rosuvastatin 20 mg oral tablet: 1 tab(s) orally once a day (at bedtime)  · 	Lantus 100 units/mL subcutaneous solution: 40 unit(s) subcutaneous once a day (at bedtime)  · 	HumaLOG 100 units/mL injectable solution: 20 unit(s) subcutaneous 3 times a day (before meals)  · 	clopidogrel 75 mg oral tablet: 1 tab(s) orally once a day in the morning        FAMILY HISTORY:  Family history of COPD (chronic obstructive pulmonary disease) (Sibling)    Family history of cardiac disorder  Paternal        Non-contributory    SOCIAL HISTORY:    [- ] Tobacco  [- ] Drugs  [- ] Alcohol    Allergies    doxepin (Other)  Zosyn (Pruritus)  ceftriaxone (Rash)  vancomycin (Rash (Mild to Mod))  penicillins (Short breath; Rash)    Intolerances    	    REVIEW OF SYSTEMS:  CONSTITUTIONAL: No fever  EYES: No eye pain, visual disturbances, or discharge  ENMT:  No difficulty hearing, tinnitus  NECK: No pain or stiffness  RESPIRATORY: + cough, wheezing,  CARDIOVASCULAR: No chest pain, palpitations, passing out, dizziness, or leg swelling  GASTROINTESTINAL:  No nausea, vomiting, diarrhea or constipation. No melena.  GENITOURINARY: No dysuria, hematuria  NEUROLOGICAL: No stroke like symptoms  SKIN: No burning or lesions   ENDOCRINE: No heat or cold intolerance  MUSCULOSKELETAL: No joint pain or swelling  PSYCHIATRIC: No  anxiety, mood swings  HEME/LYMPH: No bleeding gums  ALLERGY AND IMMUNOLOGIC: No hives or eczema	    All other ROS negative    PHYSICAL EXAM:  T(C): 36.7 (03-19-25 @ 12:05), Max: 36.7 (03-19-25 @ 12:05)  HR: 92 (03-19-25 @ 12:30) (85 - 92)  BP: 98/60 (03-19-25 @ 12:30) (86/58 - 100/58)  RR: 18 (03-19-25 @ 12:30) (18 - 22)  SpO2: 94% (03-19-25 @ 12:30) (94% - 95%)  Wt(kg): --  I&O's Summary      Appearance: Normal	  HEENT:   Normal oral mucosa, EOMI	  Cardiovascular:  S1 S2, No JVD,    Respiratory: Lungs clear to auscultation	  Psychiatry: Alert  Gastrointestinal:  Soft, Non-tender, + BS	  Skin: No rashes   Neurologic: Non-focal  Extremities:  No edema  Vascular: + right BKA    	    	  	  CARDIAC MARKERS:  Labs personally reviewed by me                                  13.3   8.51  )-----------( 199      ( 19 Mar 2025 12:11 )             44.2     03-19    134[L]  |  81[L]  |  97[H]  ----------------------------<  281[H]  3.6   |  30  |  3.18[H]    Ca    9.9      19 Mar 2025 12:11    TPro  8.3  /  Alb  4.0  /  TBili  0.9  /  DBili  x   /  AST  17  /  ALT  13  /  AlkPhos  167[H]  03-19          EKG: Personally reviewed by me -   Radiology: Personally reviewed by me -   < from: POCUS ED TTE 2D F/U, Limited w/o Cont. (03.19.25 @ 13:32) >  Severely reduced left ventricular contractility  IVC with greater than 50% respiratory variation  A line predominant lung fields  Right pleural effusion        Assessment /Plan:     64-year-old male patient past medical history of Heart Failure with reduced EF% 20 (most recent echo Sept 2024), essential HTN, CKD3, type 2 DM on high doses of bedtime and on fluctuating preprandial insulin (past episode of hypoglycemia), CAD with past PCI on ASA and Plavix, LEFT BKA in [facility] in May 2024, last discharge from [place] Sept 2024 who was brought in via EMS for hypotension.  Patient was evaluated at his PCPs office when he was found systolic in the 80s.  The past 2 days the patient has been experiencing lethargy and fatigue. Denies any fevers, chest pain, shortness of breath, right leg edema.  Does admit to dry cough.  On exam, found mentating AAOx3 with BP 90s to 100 systolic.  Endorses normal appetite.  Prior to today patient has been compliant with his home medication and diuretics.  Hypertension possibly from over diuresis at home versus infectious etiology.      Problem/Plan - 1:  ·  Problem: Hypotension  ·  Plan:  p/w low BP i/s/o recent viral infection  - Suspect volume depletion especially given MANJIT on CKD  - POCUS with predominant A lines--> obtain formal TTE  - Check BNP  - Hold sHF GDMT including diuretics  - s/p albumin administration    Problem/Plan - 2: Elevated Troponin   ·  Plan:  Patient denies CP or SOB  - Will review ECG  - Possibly i/s/o decreased clearance given MANJIT on CKD  - Recent NST as noted below with predominant infarct  - c/w ASA, Rosuvastatin    Problem/Plan - 3: Chronic systolic heart failure.   ·  Plan:    - TTE 9/2024 with known EF 20% unchanged from prior  - Hold below sHF GDMT  given hypotension. Will likely have to modify diuretics for DC  --- Toprol 25mg daily    --- Entresto 24/26mg BID   --- Aldactone 25mg as OP  --- Jardiance 10mg daily  --- Bumex 2mg PO BID  - As per wife, dry weight ~247lbs  - If BP continues to be low despite albumin administration, may consider RHC to assess CO    Problem/Plan - 4:  ·  Problem: CAD (coronary artery disease).   ·  Plan: c/w asa and statin  - Ordered Nuclear Stress test given CAD and WCT seen on tele  - NM stress test with large-sized, moderate to severe defect(s) in the mid to distal anterior, septal, apical and inferior walls that are predominantly fixed consistent with an infarction with minimal chantel-infarct ischemia.  - Given predominant infarct no role for cardiac cath    Problem/Plan - 5:  ·  Problem: MANJIT on CKD.   ·  Plan: Monitor BMP daily, dose meds per renal fx, avoid nephrotoxins.  - Hold Bumex, Jardiance, aldactone and Entresto given hypotension and MANJIT    Problem/Plan  6:  ·  Problem: ICD  ·  Plan:   s/p ICD extraction and re-implant, (9/3)      Differential diagnosis and plan of care discussed with patient after the evaluation. Counseling on diet, nutritional counseling, weight management, exercise and medication compliance was done.   Advanced care planning/advanced directives discussed with patient/family. DNR status including forceful chest compressions to attempt to restart the heart, ventilator support/artificial breathing, electric shock, artificial nutrition, health care proxy, Molst form all discussed with pt. Pt wishes to consider. More than fifteen minutes spent on discussing advanced directives.     Gertrude Villalobos [facility]  Shawn Barnes DO [facility]  Cardiovascular Medicine  [address] [place] [address] Dr, [address]  Available for call or text via [employer] TE[employer]  Office [phone]   DATE OF SERVICE: 03-19-25 @ 13:02    CHIEF COMPLAINT:Patient is a 64y old  Male who presents with a chief complaint of hypotension    HISTORY OF PRESENT ILLNESS:   64-year-old male patient past medical history of Heart Failure with reduced EF% 20 (most recent echo Sept 2024), essential HTN, CKD3, type 2 DM on high doses of bedtime and on fluctuating preprandial insulin (past episode of hypoglycemia), CAD with past PCI on ASA and Plavix, LEFT BKA in [facility] in May 2024, last discharge from [place] Sept 2024 who was brought in via EMS for hypotension.  Patient was evaluated at his PCPs office when he was found systolic in the 80s.  The past 2 days the patient has been experiencing lethargy and fatigue. Denies any fevers, chest pain, shortness of breath, right leg edema.  Does admit to dry cough.  On exam, found mentating AAOx3 with BP 90s to 100 systolic.  Endorses normal appetite.  Prior to today patient has been compliant with his home medication and diuretics.  Hypertension possibly from over diuresis at home versus infectious etiology.          PAST MEDICAL & SURGICAL HISTORY:  Stented coronary artery      Diabetes      AICD (automatic cardioverter/defibrillator) present      Hypertension      Heart failure with reduced ejection fraction      History of ischemic cardiomyopathy      History of COPD      H/O gastroesophageal reflux (GERD)      2019 novel coronavirus disease (COVID-19)      COVID-19 vaccine series completed      H/O vasectomy  20 yrs ago (2000)              MEDICATIONS:    	hydrocodone-homatropine 5 mg-1.5 mg/5 mL oral syrup: 5 milliliter(s) orally once a day (at bedtime) as needed for Cough MDD: 5ml  · 	doxycycline monohydrate 50 mg oral capsule: 2 cap(s) orally every 12 hours  · 	Medrol Dosepak 4 mg oral tablet: 1 tablet with sensor orally once a day follow package instruction  · 	clotrimazole 1% topical cream: Apply topically to affected area 2 times a day to right foot  · 	aspirin 81 mg oral delayed release tablet: 1 tab(s) orally once a day in the morning  · 	metoprolol succinate 25 mg oral capsule, extended release: 1 cap(s) orally once a day (at bedtime)  · 	tamsulosin 0.4 mg oral capsule: 1 cap(s) orally once a day (at bedtime)  · 	pantoprazole 40 mg oral delayed release tablet: 1 tab(s) orally once a day in the morning  · 	Advair Diskus 250 mcg-50 mcg inhalation powder: 1 inhaled 2 times a day  · 	Jardiance 10 mg oral tablet: 1 tab(s) orally once a day in the morning  · 	bumetanide 2 mg oral tablet: 2 tab(s) orally 2 times a day (in the morning and in the afternoon)  · 	Entresto 24 mg-26 mg oral tablet: 1 tab(s) orally 2 times a day  · 	Albuterol (Eqv-ProAir HFA) 90 mcg/inh inhalation aerosol: 2 puff(s) inhaled every 6 hours as needed  · 	rosuvastatin 20 mg oral tablet: 1 tab(s) orally once a day (at bedtime)  · 	Lantus 100 units/mL subcutaneous solution: 40 unit(s) subcutaneous once a day (at bedtime)  · 	HumaLOG 100 units/mL injectable solution: 20 unit(s) subcutaneous 3 times a day (before meals)  · 	clopidogrel 75 mg oral tablet: 1 tab(s) orally once a day in the morning        FAMILY HISTORY:  Family history of COPD (chronic obstructive pulmonary disease) (Sibling)    Family history of cardiac disorder  Paternal        Non-contributory    SOCIAL HISTORY:    [- ] Tobacco  [- ] Drugs  [- ] Alcohol    Allergies    doxepin (Other)  Zosyn (Pruritus)  ceftriaxone (Rash)  vancomycin (Rash (Mild to Mod))  penicillins (Short breath; Rash)    Intolerances    	    REVIEW OF SYSTEMS:  CONSTITUTIONAL: No fever  EYES: No eye pain, visual disturbances, or discharge  ENMT:  No difficulty hearing, tinnitus  NECK: No pain or stiffness  RESPIRATORY: + cough, wheezing,  CARDIOVASCULAR: No chest pain, palpitations, passing out, dizziness, or leg swelling  GASTROINTESTINAL:  No nausea, vomiting, diarrhea or constipation. No melena.  GENITOURINARY: No dysuria, hematuria  NEUROLOGICAL: No stroke like symptoms  SKIN: No burning or lesions   ENDOCRINE: No heat or cold intolerance  MUSCULOSKELETAL: No joint pain or swelling  PSYCHIATRIC: No  anxiety, mood swings  HEME/LYMPH: No bleeding gums  ALLERGY AND IMMUNOLOGIC: No hives or eczema	    All other ROS negative    PHYSICAL EXAM:  T(C): 36.7 (03-19-25 @ 12:05), Max: 36.7 (03-19-25 @ 12:05)  HR: 92 (03-19-25 @ 12:30) (85 - 92)  BP: 98/60 (03-19-25 @ 12:30) (86/58 - 100/58)  RR: 18 (03-19-25 @ 12:30) (18 - 22)  SpO2: 94% (03-19-25 @ 12:30) (94% - 95%)  Wt(kg): --  I&O's Summary      Appearance: Normal	  HEENT:   Normal oral mucosa, EOMI	  Cardiovascular:  S1 S2, No JVD,    Respiratory: Lungs clear to auscultation	  Psychiatry: Alert  Gastrointestinal:  Soft, Non-tender, + BS	  Skin: No rashes   Neurologic: Non-focal  Extremities:  No edema  Vascular: + right BKA    	    	  	  CARDIAC MARKERS:  Labs personally reviewed by me                                  13.3   8.51  )-----------( 199      ( 19 Mar 2025 12:11 )             44.2     03-19    134[L]  |  81[L]  |  97[H]  ----------------------------<  281[H]  3.6   |  30  |  3.18[H]    Ca    9.9      19 Mar 2025 12:11    TPro  8.3  /  Alb  4.0  /  TBili  0.9  /  DBili  x   /  AST  17  /  ALT  13  /  AlkPhos  167[H]  03-19          EKG: Personally reviewed by me -   Radiology: Personally reviewed by me -   < from: POCUS ED TTE 2D F/U, Limited w/o Cont. (03.19.25 @ 13:32) >  Severely reduced left ventricular contractility  IVC with greater than 50% respiratory variation  A line predominant lung fields  Right pleural effusion        Assessment /Plan:     64-year-old male patient past medical history of Heart Failure with reduced EF% 20 (most recent echo Sept 2024), essential HTN, CKD3, type 2 DM on high doses of bedtime and on fluctuating preprandial insulin (past episode of hypoglycemia), CAD with past PCI on ASA and Plavix, LEFT BKA in [facility] in May 2024, last discharge from [place] Sept 2024 who was brought in via EMS for hypotension.  Patient was evaluated at his PCPs office when he was found systolic in the 80s.  The past 2 days the patient has been experiencing lethargy and fatigue. Denies any fevers, chest pain, shortness of breath, right leg edema.  Does admit to dry cough.  On exam, found mentating AAOx3 with BP 90s to 100 systolic.  Endorses normal appetite.  Prior to today patient has been compliant with his home medication and diuretics.  Hypertension possibly from over diuresis at home versus infectious etiology.      Problem/Plan - 1:  ·  Problem: Hypotension  ·  Plan:  p/w low BP i/s/o recent viral infection  - Suspect volume depletion especially given MANJIT on CKD  - POCUS with predominant A lines--> obtain formal TTE  - Check BNP  - Hold sHF GDMT including diuretics  - s/p albumin administration    Problem/Plan - 2: Elevated Troponin   ·  Plan:  Patient denies CP or SOB  - Will review ECG  - Possibly i/s/o decreased clearance given MANJIT on CKD  - Recent NST as noted below with predominant infarct  - c/w ASA, Rosuvastatin    Problem/Plan - 3: Chronic systolic heart failure.   ·  Plan:    - TTE 9/2024 with known EF 20% unchanged from prior  - Hold below sHF GDMT  given hypotension. Will likely have to modify diuretics for DC  --- Toprol 25mg daily    --- Entresto 24/26mg BID   --- Aldactone 25mg as OP  --- Jardiance 10mg daily  --- Bumex 2mg PO BID  - As per wife, dry weight ~247lbs    Problem/Plan - 4:  ·  Problem: CAD (coronary artery disease).   ·  Plan: c/w asa and statin  - Ordered Nuclear Stress test given CAD and WCT seen on tele  - NM stress test with large-sized, moderate to severe defect(s) in the mid to distal anterior, septal, apical and inferior walls that are predominantly fixed consistent with an infarction with minimal chantel-infarct ischemia.  - Given predominant infarct no role for cardiac cath    Problem/Plan - 5:  ·  Problem: MANJIT on CKD.   ·  Plan: Monitor BMP daily, dose meds per renal fx, avoid nephrotoxins.  - Hold Bumex, Jardiance, aldactone and Entresto given hypotension and MANJIT    Problem/Plan  6:  ·  Problem: ICD  ·  Plan:   s/p ICD extraction and re-implant, (9/3)      Differential diagnosis and plan of care discussed with patient after the evaluation. Counseling on diet, nutritional counseling, weight management, exercise and medication compliance was done.   Advanced care planning/advanced directives discussed with patient/family. DNR status including forceful chest compressions to attempt to restart the heart, ventilator support/artificial breathing, electric shock, artificial nutrition, health care proxy, Molst form all discussed with pt. Pt wishes to consider. More than fifteen minutes spent on discussing advanced directives.     Gertrude Villalobos [facility]  Shawn Barnes DO [facility]  Cardiovascular Medicine  [address] [place] [address] Dr, [address]  Available for call or text via [employer]  Office [phone]   DATE OF SERVICE: 03-19-25 @ 13:02    CHIEF COMPLAINT:Patient is a 64y old  Male who presents with a chief complaint of hypotension    HISTORY OF PRESENT ILLNESS:   64-year-old male patient past medical history of Heart Failure with reduced EF% 20 (most recent echo Sept 2024), essential HTN, CKD3, type 2 DM on high doses of bedtime and on fluctuating preprandial insulin (past episode of hypoglycemia), CAD with past PCI on ASA and Plavix, LEFT BKA in [facility] in May 2024, last discharge from [place] Sept 2024 who was brought in via EMS for hypotension.  Patient was evaluated at his PCPs office when he was found systolic in the 80s.  The past 2 days the patient has been experiencing lethargy and fatigue. Denies any fevers, chest pain, shortness of breath, right leg edema.  Does admit to dry cough.  On exam, found mentating AAOx3 with BP 90s to 100 systolic.  Endorses normal appetite.  Prior to today patient has been compliant with his home medication and diuretics.  Hypertension possibly from over diuresis at home versus infectious etiology.          PAST MEDICAL & SURGICAL HISTORY:  Stented coronary artery      Diabetes      AICD (automatic cardioverter/defibrillator) present      Hypertension      Heart failure with reduced ejection fraction      History of ischemic cardiomyopathy      History of COPD      H/O gastroesophageal reflux (GERD)      2019 novel coronavirus disease (COVID-19)      COVID-19 vaccine series completed      H/O vasectomy  20 yrs ago (2000)              MEDICATIONS:    	hydrocodone-homatropine 5 mg-1.5 mg/5 mL oral syrup: 5 milliliter(s) orally once a day (at bedtime) as needed for Cough MDD: 5ml  · 	doxycycline monohydrate 50 mg oral capsule: 2 cap(s) orally every 12 hours  · 	Medrol Dosepak 4 mg oral tablet: 1 tablet with sensor orally once a day follow package instruction  · 	clotrimazole 1% topical cream: Apply topically to affected area 2 times a day to right foot  · 	aspirin 81 mg oral delayed release tablet: 1 tab(s) orally once a day in the morning  · 	metoprolol succinate 25 mg oral capsule, extended release: 1 cap(s) orally once a day (at bedtime)  · 	tamsulosin 0.4 mg oral capsule: 1 cap(s) orally once a day (at bedtime)  · 	pantoprazole 40 mg oral delayed release tablet: 1 tab(s) orally once a day in the morning  · 	Advair Diskus 250 mcg-50 mcg inhalation powder: 1 inhaled 2 times a day  · 	Jardiance 10 mg oral tablet: 1 tab(s) orally once a day in the morning  · 	bumetanide 2 mg oral tablet: 2 tab(s) orally 2 times a day (in the morning and in the afternoon)  · 	Entresto 24 mg-26 mg oral tablet: 1 tab(s) orally 2 times a day  · 	Albuterol (Eqv-ProAir HFA) 90 mcg/inh inhalation aerosol: 2 puff(s) inhaled every 6 hours as needed  · 	rosuvastatin 20 mg oral tablet: 1 tab(s) orally once a day (at bedtime)  · 	Lantus 100 units/mL subcutaneous solution: 40 unit(s) subcutaneous once a day (at bedtime)  · 	HumaLOG 100 units/mL injectable solution: 20 unit(s) subcutaneous 3 times a day (before meals)  · 	clopidogrel 75 mg oral tablet: 1 tab(s) orally once a day in the morning        FAMILY HISTORY:  Family history of COPD (chronic obstructive pulmonary disease) (Sibling)    Family history of cardiac disorder  Paternal        Non-contributory    SOCIAL HISTORY:    [- ] Tobacco  [- ] Drugs  [- ] Alcohol    Allergies    doxepin (Other)  Zosyn (Pruritus)  ceftriaxone (Rash)  vancomycin (Rash (Mild to Mod))  penicillins (Short breath; Rash)    Intolerances    	    REVIEW OF SYSTEMS:  CONSTITUTIONAL: No fever  EYES: No eye pain, visual disturbances, or discharge  ENMT:  No difficulty hearing, tinnitus  NECK: No pain or stiffness  RESPIRATORY: + cough, wheezing,  CARDIOVASCULAR: No chest pain, palpitations, passing out, dizziness, or leg swelling  GASTROINTESTINAL:  No nausea, vomiting, diarrhea or constipation. No melena.  GENITOURINARY: No dysuria, hematuria  NEUROLOGICAL: No stroke like symptoms  SKIN: No burning or lesions   ENDOCRINE: No heat or cold intolerance  MUSCULOSKELETAL: No joint pain or swelling  PSYCHIATRIC: No  anxiety, mood swings  HEME/LYMPH: No bleeding gums  ALLERGY AND IMMUNOLOGIC: No hives or eczema	    All other ROS negative    PHYSICAL EXAM:  T(C): 36.7 (03-19-25 @ 12:05), Max: 36.7 (03-19-25 @ 12:05)  HR: 92 (03-19-25 @ 12:30) (85 - 92)  BP: 98/60 (03-19-25 @ 12:30) (86/58 - 100/58)  RR: 18 (03-19-25 @ 12:30) (18 - 22)  SpO2: 94% (03-19-25 @ 12:30) (94% - 95%)  Wt(kg): --  I&O's Summary      Appearance: Normal	  HEENT:   Normal oral mucosa, EOMI	  Cardiovascular:  S1 S2, No JVD,    Respiratory: Lungs clear to auscultation	  Psychiatry: Alert  Gastrointestinal:  Soft, Non-tender, + BS	  Skin: No rashes   Neurologic: Non-focal  Extremities:  No edema  Vascular: + right BKA    	    	  	  CARDIAC MARKERS:  Labs personally reviewed by me                                  13.3   8.51  )-----------( 199      ( 19 Mar 2025 12:11 )             44.2     03-19    134[L]  |  81[L]  |  97[H]  ----------------------------<  281[H]  3.6   |  30  |  3.18[H]    Ca    9.9      19 Mar 2025 12:11    TPro  8.3  /  Alb  4.0  /  TBili  0.9  /  DBili  x   /  AST  17  /  ALT  13  /  AlkPhos  167[H]  03-19          EKG: Personally reviewed by me -   Radiology: Personally reviewed by me -   < from: POCUS ED TTE 2D F/U, Limited w/o Cont. (03.19.25 @ 13:32) >  Severely reduced left ventricular contractility  IVC with greater than 50% respiratory variation  A line predominant lung fields  Right pleural effusion        Assessment /Plan:     64-year-old male patient past medical history of Heart Failure with reduced EF% 20 (most recent echo Sept 2024), essential HTN, CKD3, type 2 DM on high doses of bedtime and on fluctuating preprandial insulin (past episode of hypoglycemia), CAD with past PCI on ASA and Plavix, LEFT BKA in [facility] in May 2024, last discharge from [place] Sept 2024 who was brought in via EMS for hypotension.  Patient was evaluated at his PCPs office when he was found systolic in the 80s.  The past 2 days the patient has been experiencing lethargy and fatigue. Denies any fevers, chest pain, shortness of breath, right leg edema.  Does admit to dry cough.  On exam, found mentating AAOx3 with BP 90s to 100 systolic.  Endorses normal appetite.  Prior to today patient has been compliant with his home medication and diuretics.  Hypertension possibly from over diuresis at home versus infectious etiology.      Problem/Plan - 1:  ·  Problem: Hypotension  ·  Plan:  p/w low BP i/s/o recent viral infection and started on Metolazone daily dosing 3 days PTA by PCP  - Suspect volume depletion 2/2addition of Metolazone especially given MANJIT on CKD  - POCUS with predominant A lines--> obtain formal TTE  - Check BNP  - Hold sHF GDMT including diuretics  - s/p albumin administration    Problem/Plan - 2: Elevated Troponin   ·  Plan:  Patient denies CP or SOB  - Will review ECG  - Possibly i/s/o decreased clearance given MANJIT on CKD  - Recent NST as noted below with predominant infarct  - c/w ASA, Rosuvastatin    Problem/Plan - 3: Chronic systolic heart failure.   ·  Plan:    - TTE 9/2024 with known EF 20% unchanged from prior  - Hold below sHF GDMT  given hypotension. Will likely have to modify diuretics for DC  --- Toprol 25mg daily    --- Entresto 24/26mg BID   --- Aldactone 25mg as OP  --- Jardiance 10mg daily  --- Bumex 2mg PO BID  --- Will not retsart Metolazone  - As per wife, dry weight ~247lbs    Problem/Plan - 4:  ·  Problem: CAD (coronary artery disease).   ·  Plan: c/w asa and statin  - Ordered Nuclear Stress test given CAD and WCT seen on tele  - NM stress test with large-sized, moderate to severe defect(s) in the mid to distal anterior, septal, apical and inferior walls that are predominantly fixed consistent with an infarction with minimal chantel-infarct ischemia.  - Given predominant infarct no role for cardiac cath    Problem/Plan - 5:  ·  Problem: MANJIT on CKD.   ·  Plan: Monitor BMP daily, dose meds per renal fx, avoid nephrotoxins.  - Hold Bumex, Jardiance, aldactone and Entresto given hypotension and MANJIT    Problem/Plan  6:  ·  Problem: ICD  ·  Plan:   s/p ICD extraction and re-implant, (9/3)             [facility]al[facility]os [facility]  Shawn Barnes, DO [facility]  Cardiovascular Medicine  [address] [place] [address] Dr, [address]  Available for call or text via [employer]  Office [phone]

## 2025-03-19 NOTE — H&P ADULT - PROBLEM SELECTOR PLAN 5
FS elevated on high doses of insulin at home   states if his FS>300, he takes 50 units of Humalog   received much lower doses on admission previously   will lower home doses of insulin given likely improved diet here and MANJIT with lantus 20 units qhs, and humalog 8 units qac, adjust as needed  monitor FS  ISS for coverage

## 2025-03-19 NOTE — ED ADULT NURSE NOTE - NSFALLHARMRISKINTERV_ED_ALL_ED

## 2025-03-19 NOTE — ED PROVIDER NOTE - ATTENDING CONTRIBUTION TO CARE
Hx: pt with h/o COPD, HF with reduced EF 20% presenting from PCP with 7lb weight loss from dry weight (dry weight 247lbs, reported weight in PCP office per patient is 240lbs) associated with 2wks of green sputum cough with sick family members and baby with RSV.  Pt compliant with meds including bumex 2mg BID and metalozone.  Noted low BPs in office.      PE: well appearing, nontoxic, no respiratory distress, mental status intact, no JVD, slight coarse bs and wheezing at base, ab soft, nt, LLE prosthesis, RLE chronic venous stasis dermatitis.      MDM: hypotension likely due to overdiuresis, less likely sepsis, check cbc r/o anemia or leukocytosis, check bmp to r/o metabolic derangement and lyte imbalance, trop, probnp, ekg, xray chest, given low EF and high risk for fluid overload, will replete volume loss with 250ml albumin 5%, POCUS Echo, po intake, and determine need for inpatient vs outpatient treatment.    Progress Note 4915p: I had an attending to attending conversation with Dr. Barnes, pt's cardiologist, agrees with initial plan, will see patient.  Pt and wife updated on plan and to plan for at least observation overnight vs inpatient stay depending on results and repeat BPs post albumin.

## 2025-03-19 NOTE — ED PROVIDER NOTE - CARE PLAN
1 Principal Discharge DX:	Acute on chronic systolic congestive heart failure  Secondary Diagnosis:	Bronchitis, chronic

## 2025-03-19 NOTE — ED PROVIDER NOTE - WR ORDER NAME 1
Continuity of Care Form    Patient Name: Alberto Kruger   :  1990  MRN:  [id number]    Admit date:  2019  Discharge date:  ***    Code Status Order: No Order   Advance Directives:     Admitting Physician:  No admitting provider for patient encounter. PCP: No primary care provider on file. Discharging Nurse: [facility] Unit/Room#: 005/B-05  Discharging Unit Phone Number: ***    Emergency Contact:   Extended Emergency Contact Information  Primary Emergency Contact: No,One  Home Phone: [phone]  Relation: Other   needed? No    Past Surgical History:  History reviewed. No pertinent surgical history. Immunization History: There is no immunization history on file for this patient. Active Problems: There is no problem list on file for this patient.       Isolation/Infection:   Isolation          No Isolation            Nurse Assessment:  Last Vital Signs: BP (!) 136/98   Pulse 88   Temp 97.7 °F (36.5 °C) (Oral)   Resp 20   Ht 5' 4\" (1.626 m)   Wt 150 lb 2.1 oz (68.1 kg)   LMP 2019   SpO2 92%   BMI 25.77 kg/m²     Last documented pain score (0-10 scale): Pain Level: 6  Last Weight:   Wt Readings from Last 1 Encounters:   19 150 lb 2.1 oz (68.1 kg)     Mental Status:  {IP PT MENTAL STATUS:42581}    IV Access:  { YARITZA IV ACCESS:904724108}    Nursing Mobility/ADLs:  Walking   {P DME QJLJ:704412002}  Transfer  {P DME YRHY:740350611}  Bathing  {P DME RXGC:978145877}  Dressing  {P DME THPV:114709429}  Toileting  {P DME TYWU:828277121}  Feeding  {P DME RFAL:566412692}  Med Admin  {P DME JDQQ:462398056}  Med Delivery   { YARITZA MED Delivery:874662966}    Wound Care Documentation and Therapy:        Elimination:  Continence:   · Bowel: {YES / QP:44880}  · Bladder: {YES / XZ:66352}  Urinary Catheter: {Urinary Catheter:685839679}   Colostomy/Ileostomy/Ileal Conduit: {YES / FC:19167}       Date of Last BM: ***  No intake or output data in the 24 hours ending 19 0030  No intake/output data recorded.     Safety Concerns:     [address] Safety Concerns:202031698}    Impairments/Disabilities:      [address]IGIL Impairments/Disabilities:850974957}    Nutrition Therapy:  Current Nutrition Therapy:   [address] VIGIL Diet List:381899365}    Routes of Feeding: {CHP DME Other Feedings:464436044}  Liquids: {Slp liquid thickness:04241}  Daily Fluid Restriction: {CHP DME Yes amt example:643223537}  Last Modified Barium Swallow with Video (Video Swallowing Test): {Done Not Done LIK}    Treatments at the Time of Hospital Discharge:   Respiratory Treatments: ***  Oxygen Therapy:  {Therapy; copd oxygen:89649}  Ventilator:    { CC Vent VOCM:420077328}    Rehab Therapies: {THERAPEUTIC INTERVENTION:0661380319}  Weight Bearing Status/Restrictions: Sundar Wright  Weight Bearin}  Other Medical Equipment (for information only, NOT a DME order):  {EQUIPMENT:503597640}  Other Treatments: ***    Patient's personal belongings (please select all that are sent with patient):  {CHP DME Belongings:387470443}    RN SIGNATURE:  {Esignature:433814514}    CASE MANAGEMENT/SOCIAL WORK SECTION    Inpatient Status Date: ***    Readmission Risk Assessment Score:  Readmission Risk              Risk of Unplanned Readmission:        0           Discharging to Facility/ Agency   · Name:   · Address:  · Phone:  · Fax:    Dialysis Facility (if applicable)   · Name:  · Address:  · Dialysis Schedule:  · Phone:  · Fax:    / signature: {Esignature:684789579}    PHYSICIAN SECTION    Prognosis: {Prognosis:8542259989}    Condition at Discharge: 50[address] Wright Patient Condition:363265869}    Rehab Potential (if transferring to Rehab): {Prognosis:6167958684}    Recommended Labs or Other Treatments After Discharge: ***    Physician Certification: I certify the above information and transfer of Suzy Eason  is necessary for the continuing treatment of the diagnosis listed and that she requires {Admit to Appropriate Level of Care:13207} for {GREATER/LESS:538113214} 30 days.      Update Admission H&P: {CHP DME Changes in GWI:210256607}    PHYSICIAN SIGNATURE:  {Esignature:874884737} Xray Chest 1 View- PORTABLE-Urgent

## 2025-03-19 NOTE — H&P ADULT - HISTORY OF PRESENT ILLNESS
64M h/o CHF with severely reduced LV function s/p AICD, CAD s/p PCI, DM2, L BKA, CKD3, COPD, GERD sent in to ED by PCP office for hypotension.  patient is a poor historian, some history obtained with collateral from wife  64M h/o CHF with severely reduced LV function s/p AICD, CAD s/p PCI, DM2, L BKA, CKD3, COPD, GERD sent in to ED by PCP office for hypotension. the patient states he was in heart failure a few days ago and was started on metolazone 3 days ago by his pcp (metolazone filled 11 days ago). Wife states he was taking metolazone daily for about a week and lost about 40 lbs in that time. he has been taking all of his medications as prescribed. He developed a cough with shortness of breath about a week ago and was given a zpak which he finished. +sick contact- his grandchild has a viral infection. Overall, he feels slightly better but coughing has not improved. cough productive of greenish sputum at times though that has decreased. He denies any fever or chills. he has been taking his prn albuterol about 3x/day recently. currently, he denies any chest pain, sob, abdominal pain, change in appetite. he does c/o the persistent cough and feels he is unable to bring up sputum.

## 2025-03-19 NOTE — H&P ADULT - TIME BILLING
reviewing documentation, reviewing and interpreting labs/imaging, interviewing and examining patient, discussing plan of care with patient and wife on phone, documentation, coordinating care with ACP/cards/renal.

## 2025-03-19 NOTE — ED ADULT NURSE NOTE - OBJECTIVE STATEMENT
64 year old male presented to ED via [facility] EMS from PCP with c/o of hypotension and lethargy x2 days. as per EMS pt had episode of hypotension in office, sent to ED for evaluation. hx CHF HLD DM2 and ICU admission on Dobutamine drip 2 months ago. pt denies CP, SOB, nausea/vomiting, numbness/tingling, fever, cough, chills, dizziness, headache, blurred vision, neuro intact. pt a&ox3, lung sounds clear, heart rate regular, on cardiac monitor, EKG completed handed to MD, abdomen soft nontender nondistended to palp. skin intact. IV In left forearm 18G placed by EMS en route and patent, IV In right forearm 18G and patent placed at [facility]. Will continue to monitor and assess while offering support and reassurance.

## 2025-03-19 NOTE — H&P ADULT - ASSESSMENT
64M h/o CHF with severely reduced LV function s/p AICD, CAD s/p PCI, DM2, L BKA, CKD3, COPD, GERD sent in to ED by PCP office for hypotension.  64M h/o CHF with severely reduced LV function s/p AICD, CAD s/p PCI, DM2, L BKA, CKD3, COPD, GERD sent in to ED by PCP office for hypotension likely due to overdiuresis found to have MANJIT on CKD.

## 2025-03-19 NOTE — CONSULT NOTE ADULT - SUBJECTIVE AND OBJECTIVE BOX
[facility]  [phone]  NEPHROLOGY      INITIAL CONSULT NOTE  --------------------------------------------------------------------------------  HPI:      64M h/o CHF with severely reduced LV function s/p AICD, CAD s/p PCI, DM2, L BKA, CKD3, COPD, GERD sent in to ED by PCP office for hypotension. the patient states he was in heart failure a few days ago and was started on metolazone 3 days ago by his pcp (metolazone filled 11 days ago). Wife states he was taking metolazone daily for about a week and lost about 40 lbs in that time. he has been taking all of his medications as prescribed. He developed a cough with shortness of breath about a week ago and was given a zpak which he finished. +sick contact- his grandchild has a viral infection. Overall, he feels slightly better but coughing has not improved. cough productive of greenish sputum at times though that has decreased. He denies any fever or chills. he has been taking his prn albuterol about 3x/day recently. currently, he denies any chest pain, sob, abdominal pain, change in appetite. he does c/o the persistent cough and feels he is unable to bring up sputum.   noticed with high creatinine. renal consult called.     PAST HISTORY  --------------------------------------------------------------------------------  PAST MEDICAL & SURGICAL HISTORY:  Stented coronary artery      Diabetes      AICD (automatic cardioverter/defibrillator) present      Hypertension      Heart failure with reduced ejection fraction      History of ischemic cardiomyopathy      History of COPD      H/O gastroesophageal reflux (GERD)      2019 novel coronavirus disease (COVID-19)      COVID-19 vaccine series completed      H/O vasectomy  20 yrs ago (2000)        FAMILY HISTORY:  Family history of COPD (chronic obstructive pulmonary disease) (Sibling)    Family history of cardiac disorder  Paternal      PAST SOCIAL HISTORY:    ALLERGIES & MEDICATIONS  --------------------------------------------------------------------------------  Allergies    doxepin (Other)  Zosyn (Pruritus)  ceftriaxone (Rash)  vancomycin (Rash (Mild to Mod))  penicillins (Short breath; Rash)    Intolerances      Standing Inpatient Medications  albuterol/ipratropium for Nebulization 3 milliLiter(s) Nebulizer every 8 hours  aspirin enteric coated 81 milliGRAM(s) Oral daily  clopidogrel Tablet 75 milliGRAM(s) Oral daily  dextrose 5%. 1000 milliLiter(s) IV Continuous <Continuous>  dextrose 50% Injectable 25 Gram(s) IV Push once  dextrose 50% Injectable 25 Gram(s) IV Push once  fluticasone propionate/ salmeterol 250-50 MICROgram(s) Diskus 1 Dose(s) Inhalation two times a day  glucagon  Injectable 1 milliGRAM(s) IntraMuscular once  insulin glargine Injectable (LANTUS) 20 Unit(s) SubCutaneous at bedtime  insulin lispro (ADMELOG) corrective regimen sliding scale   SubCutaneous at bedtime  insulin lispro (ADMELOG) corrective regimen sliding scale   SubCutaneous three times a day before meals  insulin lispro Injectable (ADMELOG) 8 Unit(s) SubCutaneous before dinner  metoprolol succinate ER 25 milliGRAM(s) Oral daily  pantoprazole    Tablet 40 milliGRAM(s) Oral before breakfast  rosuvastatin 10 milliGRAM(s) Oral at bedtime  tamsulosin 0.4 milliGRAM(s) Oral at bedtime    PRN Inpatient Medications  acetaminophen     Tablet .. 650 milliGRAM(s) Oral every 6 hours PRN  dextrose Oral Gel 15 Gram(s) Oral once PRN  hydrocodone/homatropine Syrup 5 milliLiter(s) Oral every 6 hours PRN  melatonin 3 milliGRAM(s) Oral at bedtime PRN      REVIEW OF SYSTEMS  --------------------------------------------------------------------------------  Gen: No  fevers/chills   Skin: No rashes  Head/Eyes/Ears/Mouth: No headache; Normal hearing;  No sinus pain/discomfort, sore throat  Respiratory: No dyspnea, cough, wheezing  CV: No chest pain, orthopnea  GI: No abdominal pain, diarrhea, nausea, vomiting, melena  : No dysuria, decrease urination or hesitancy urinating  hematuria, nocturia  MSK: No joint pain/swelling; no back pain  Neuro:  +  dizziness/lightheadedness,  also with no edema       VITALS/PHYSICAL EXAM  --------------------------------------------------------------------------------  T(C): 36.6 (03-19-25 @ 20:32), Max: 36.7 (03-19-25 @ 12:05)  HR: 90 (03-19-25 @ 20:32) (82 - 92)  BP: 103/64 (03-19-25 @ 20:32) (82/56 - 107/67)  RR: 18 (03-19-25 @ 20:32) (18 - 23)  SpO2: 95% (03-19-25 @ 20:32) (94% - 97%)  Wt(kg): --  Height (cm): 195.6 (03-19-25 @ 11:45)  Weight (kg): 108.9 (03-19-25 @ 11:45)  BMI (kg/m2): 28.5 (03-19-25 @ 11:45)  BSA (m2): 2.42 (03-19-25 @ 11:45)      Physical Exam:  	Gen: Non toxic comfortable appearing   	no jvd   	Pulm: decrease bs  no rales or ronchi or wheezing  	CV: RRR, S1S2; no rub  	Back: No CVA tenderness  	Abd: +BS, soft, nontender/nondistended  	: No suprapubic tenderness  	UE: Warm, no cyanosis  no clubbing,  no edema; no asterixis  	LE: Warm, no cyanosis  no clubbing, no edema L BKA   	Neuro: alert and oriented. speech coherent   		Skin: Warm, no decrease skin turgor   	    LABS/STUDIES  --------------------------------------------------------------------------------              13.3   8.51  >-----------<  199      [03-19-25 @ 12:11]              44.2     130  |  84  |  109  ----------------------------<  332      [03-19-25 @ 15:03]  3.6   |  26  |  2.93        Ca     9.2     [03-19-25 @ 15:03]    TPro  8.3  /  Alb  4.0  /  TBili  0.9  /  DBili  x   /  AST  17  /  ALT  13  /  AlkPhos  167  [03-19-25 @ 12:11]    PT/INR: PT 13.2 , INR 1.15       [03-19-25 @ 12:11]  PTT: 33.7       [03-19-25 @ 12:11]      Creatinine Trend:  SCr 2.93 [03-19 @ 15:03]  SCr 3.18 [03-19 @ 12:11]          Iron 24, TIBC 395, %sat 6      [08-29-24 @ 06:04]  Ferritin 51      [08-29-24 @ 07:31]      Free Light Chains: kappa 9.40, lambda 8.09, ratio = 1.16      [08-30 @ 06:49]  Immunofixation Serum:   Weak IgG Lambda Band Identified      Reference Range: None Detected      [08-30-24 @ 06:49]  SPEP Interpretation: Weak Gamma-Migrating Paraprotein Identified      [08-30-24 @ 06:49]

## 2025-03-19 NOTE — H&P ADULT - NSHPPHYSICALEXAM_GEN_ALL_CORE
Vital Signs Last 24 Hrs  T(C): 36.4 (19 Mar 2025 14:40), Max: 36.7 (19 Mar 2025 12:05)  T(F): 97.6 (19 Mar 2025 14:40), Max: 98 (19 Mar 2025 12:05)  HR: 82 (19 Mar 2025 15:53) (82 - 92)  BP: 95/53 (19 Mar 2025 15:53) (82/56 - 107/67)  BP(mean): 69 (19 Mar 2025 15:53) (65 - 81)  RR: 18 (19 Mar 2025 15:53) (18 - 23)  SpO2: 97% (19 Mar 2025 15:53) (94% - 97%)    Parameters below as of 19 Mar 2025 15:53  Patient On (Oxygen Delivery Method): room air    PHYSICAL EXAM:  GENERAL: NAD, breathing normal  HEAD:  Atraumatic, Normocephalic  EYES: conjunctiva and sclera clear  NECK: supple, No JVD  CHEST/LUNG: CTA b/l  HEART: S1 S2 RRR  ABDOMEN: +BS Soft, NT/ND  EXTREMITIES:  2+ DP Pulses, No c/c. no LE edema  NEUROLOGY: AAOx3, no facial droop, moving all extremities   SKIN: No rashes or lesions Vital Signs Last 24 Hrs  T(C): 36.4 (19 Mar 2025 14:40), Max: 36.7 (19 Mar 2025 12:05)  T(F): 97.6 (19 Mar 2025 14:40), Max: 98 (19 Mar 2025 12:05)  HR: 82 (19 Mar 2025 15:53) (82 - 92)  BP: 95/53 (19 Mar 2025 15:53) (82/56 - 107/67)  BP(mean): 69 (19 Mar 2025 15:53) (65 - 81)  RR: 18 (19 Mar 2025 15:53) (18 - 23)  SpO2: 97% (19 Mar 2025 15:53) (94% - 97%)    Parameters below as of 19 Mar 2025 15:53  Patient On (Oxygen Delivery Method): room air    PHYSICAL EXAM:  GENERAL: NAD, breathing normal  EYES: conjunctiva and sclera clear  NECK: supple, No JVD  CHEST/LUNG: poor air entry, no wheezing appreciated, coughing with deep breaths   HEART: S1 S2 RRR  ABDOMEN: +BS Soft, NT/ND  EXTREMITIES:  2+RLE edema, LBKA  NEUROLOGY: AAOx3, no facial droop, moving all extremities   SKIN: purplish discoloration of RLE, no significant warmth to touch

## 2025-03-19 NOTE — H&P ADULT - NSHPLABSRESULTS_GEN_ALL_CORE
LABS:                        13.3   8.51  )-----------( 199      ( 19 Mar 2025 12:11 )             44.2     03-19    130[L]  |  84[L]  |  109[H]  ----------------------------<  332[H]  3.6   |  26  |  2.93[H]    Ca    9.2      19 Mar 2025 15:03    TPro  8.3  /  Alb  4.0  /  TBili  0.9  /  DBili  x   /  AST  17  /  ALT  13  /  AlkPhos  167[H]  03-19    PT/INR - ( 19 Mar 2025 12:11 )   PT: 13.2 sec;   INR: 1.15 ratio         PTT - ( 19 Mar 2025 12:11 )  PTT:33.7 sec      Urinalysis Basic - ( 19 Mar 2025 15:03 )    Color: x / Appearance: x / SG: x / pH: x  Gluc: 332 mg/dL / Ketone: x  / Bili: x / Urobili: x   Blood: x / Protein: x / Nitrite: x   Leuk Esterase: x / RBC: x / WBC x   Sq Epi: x / Non Sq Epi: x / Bacteria: x        RADIOLOGY & ADDITIONAL TESTS:    Imaging Personally Reviewed: EKG tracing reviewed -   CXR film reviewed moderate R pleural effusion unchanged, atelectasis   Consultant(s) Notes Reviewed:    Care Discussed with Consultants/Other Providers: LABS:                        13.3   8.51  )-----------( 199      ( 19 Mar 2025 12:11 )             44.2     03-19    130[L]  |  84[L]  |  109[H]  ----------------------------<  332[H]  3.6   |  26  |  2.93[H]    Ca    9.2      19 Mar 2025 15:03    TPro  8.3  /  Alb  4.0  /  TBili  0.9  /  DBili  x   /  AST  17  /  ALT  13  /  AlkPhos  167[H]  03-19    PT/INR - ( 19 Mar 2025 12:11 )   PT: 13.2 sec;   INR: 1.15 ratio         PTT - ( 19 Mar 2025 12:11 )  PTT:33.7 sec      Urinalysis Basic - ( 19 Mar 2025 15:03 )    Color: x / Appearance: x / SG: x / pH: x  Gluc: 332 mg/dL / Ketone: x  / Bili: x / Urobili: x   Blood: x / Protein: x / Nitrite: x   Leuk Esterase: x / RBC: x / WBC x   Sq Epi: x / Non Sq Epi: x / Bacteria: x        RADIOLOGY & ADDITIONAL TESTS:    Imaging Personally Reviewed: EKG tracing reviewed - Sinus@92bpm, PVC, RBBB, qtc prolongation - overall unchanged from previous ekg  CXR film reviewed moderate R pleural effusion unchanged, atelectasis   Consultant(s) Notes Reviewed:    Care Discussed with Consultants/Other Providers:

## 2025-03-19 NOTE — H&P ADULT - PROBLEM SELECTOR PLAN 2
likely due to overdiuresis   will hold diuretics for now   check bladder scan   urine studies  renal consult called

## 2025-03-19 NOTE — ED PROVIDER NOTE - PHYSICAL EXAMINATION
GENERAL: Awake, alert, NAD  HEENT: NC/AT, moist mucous membranes, EOMI  LUNGS: CTAB, no wheezes or crackles   CARDIAC: RRR, no m/r/g  ABDOMEN: Soft, non tender, non distended, no rebound, no guarding  BACK: No midline spinal tenderness, no CVA tenderness  EXT: Left lower extremity BKA, right lower extremity with slight pitting edema, 2+ PT  NEURO: A&Ox3. Moving all extremities.  SKIN: Warm and dry. No rash.  PSYCH: Normal affect.

## 2025-03-19 NOTE — ED ADULT TRIAGE NOTE - CHIEF COMPLAINT QUOTE
generalized weakness x 2 days, went to PCP and found to have systolic BP in 70s  Hx CHF  SPO2 initially 94% on room air, became 87% on room air during triage

## 2025-03-20 LAB
A1C WITH ESTIMATED AVERAGE GLUCOSE RESULT: 10.4 % — HIGH (ref 4–5.6)
ANION GAP SERPL CALC-SCNC: 18 MMOL/L — HIGH (ref 5–17)
ANION GAP SERPL CALC-SCNC: 20 MMOL/L — HIGH (ref 5–17)
APPEARANCE UR: CLEAR — SIGNIFICANT CHANGE UP
BILIRUB UR-MCNC: NEGATIVE — SIGNIFICANT CHANGE UP
BUN SERPL-MCNC: 115 MG/DL — HIGH (ref 7–23)
BUN SERPL-MCNC: 116 MG/DL — HIGH (ref 7–23)
CALCIUM SERPL-MCNC: 9.5 MG/DL — SIGNIFICANT CHANGE UP (ref 8.4–10.5)
CALCIUM SERPL-MCNC: 9.6 MG/DL — SIGNIFICANT CHANGE UP (ref 8.4–10.5)
CHLORIDE SERPL-SCNC: 87 MMOL/L — LOW (ref 96–108)
CHLORIDE SERPL-SCNC: 87 MMOL/L — LOW (ref 96–108)
CHOLEST SERPL-MCNC: 142 MG/DL — SIGNIFICANT CHANGE UP
CO2 SERPL-SCNC: 26 MMOL/L — SIGNIFICANT CHANGE UP (ref 22–31)
CO2 SERPL-SCNC: 27 MMOL/L — SIGNIFICANT CHANGE UP (ref 22–31)
COLOR SPEC: YELLOW — SIGNIFICANT CHANGE UP
CREAT ?TM UR-MCNC: 85 MG/DL — SIGNIFICANT CHANGE UP
CREAT SERPL-MCNC: 3.23 MG/DL — HIGH (ref 0.5–1.3)
CREAT SERPL-MCNC: 3.76 MG/DL — HIGH (ref 0.5–1.3)
DIFF PNL FLD: NEGATIVE — SIGNIFICANT CHANGE UP
EGFR: 17 ML/MIN/1.73M2 — LOW
EGFR: 17 ML/MIN/1.73M2 — LOW
EGFR: 21 ML/MIN/1.73M2 — LOW
EGFR: 21 ML/MIN/1.73M2 — LOW
ESTIMATED AVERAGE GLUCOSE: 252 MG/DL — HIGH (ref 68–114)
GLUCOSE BLDC GLUCOMTR-MCNC: 139 MG/DL — HIGH (ref 70–99)
GLUCOSE BLDC GLUCOMTR-MCNC: 160 MG/DL — HIGH (ref 70–99)
GLUCOSE BLDC GLUCOMTR-MCNC: 203 MG/DL — HIGH (ref 70–99)
GLUCOSE BLDC GLUCOMTR-MCNC: 241 MG/DL — HIGH (ref 70–99)
GLUCOSE BLDC GLUCOMTR-MCNC: 355 MG/DL — HIGH (ref 70–99)
GLUCOSE BLDC GLUCOMTR-MCNC: 388 MG/DL — HIGH (ref 70–99)
GLUCOSE SERPL-MCNC: 168 MG/DL — HIGH (ref 70–99)
GLUCOSE SERPL-MCNC: 328 MG/DL — HIGH (ref 70–99)
GLUCOSE UR QL: 500 MG/DL
HCT VFR BLD CALC: 42.3 % — SIGNIFICANT CHANGE UP (ref 39–50)
HDLC SERPL-MCNC: 27 MG/DL — LOW
HGB BLD-MCNC: 12.9 G/DL — LOW (ref 13–17)
KETONES UR-MCNC: NEGATIVE MG/DL — SIGNIFICANT CHANGE UP
LEUKOCYTE ESTERASE UR-ACNC: NEGATIVE — SIGNIFICANT CHANGE UP
LIPID PNL WITH DIRECT LDL SERPL: 73 MG/DL — SIGNIFICANT CHANGE UP
MCHC RBC-ENTMCNC: 21.8 PG — LOW (ref 27–34)
MCHC RBC-ENTMCNC: 30.5 G/DL — LOW (ref 32–36)
MCV RBC AUTO: 71.3 FL — LOW (ref 80–100)
NITRITE UR-MCNC: NEGATIVE — SIGNIFICANT CHANGE UP
NON HDL CHOLESTEROL: 114 MG/DL — SIGNIFICANT CHANGE UP
NRBC BLD AUTO-RTO: 0 /100 WBCS — SIGNIFICANT CHANGE UP (ref 0–0)
PH UR: 5.5 — SIGNIFICANT CHANGE UP (ref 5–8)
PLATELET # BLD AUTO: 174 K/UL — SIGNIFICANT CHANGE UP (ref 150–400)
POTASSIUM SERPL-MCNC: 3.4 MMOL/L — LOW (ref 3.5–5.3)
POTASSIUM SERPL-MCNC: 3.9 MMOL/L — SIGNIFICANT CHANGE UP (ref 3.5–5.3)
POTASSIUM SERPL-SCNC: 3.4 MMOL/L — LOW (ref 3.5–5.3)
POTASSIUM SERPL-SCNC: 3.9 MMOL/L — SIGNIFICANT CHANGE UP (ref 3.5–5.3)
PROT ?TM UR-MCNC: 13 MG/DL — HIGH (ref 0–12)
PROT UR-MCNC: NEGATIVE MG/DL — SIGNIFICANT CHANGE UP
PROT/CREAT UR-RTO: 0.2 RATIO — SIGNIFICANT CHANGE UP (ref 0–0.2)
RBC # BLD: 5.93 M/UL — HIGH (ref 4.2–5.8)
RBC # FLD: 18.5 % — HIGH (ref 10.3–14.5)
SODIUM SERPL-SCNC: 132 MMOL/L — LOW (ref 135–145)
SODIUM SERPL-SCNC: 133 MMOL/L — LOW (ref 135–145)
SODIUM UR-SCNC: 64 MMOL/L — SIGNIFICANT CHANGE UP
SP GR SPEC: 1.01 — SIGNIFICANT CHANGE UP (ref 1–1.03)
TRIGL SERPL-MCNC: 253 MG/DL — HIGH
UROBILINOGEN FLD QL: 1 MG/DL — SIGNIFICANT CHANGE UP (ref 0.2–1)
UUN UR-MCNC: 463 MG/DL — SIGNIFICANT CHANGE UP
WBC # BLD: 8.55 K/UL — SIGNIFICANT CHANGE UP (ref 3.8–10.5)
WBC # FLD AUTO: 8.55 K/UL — SIGNIFICANT CHANGE UP (ref 3.8–10.5)

## 2025-03-20 RX ADMIN — IPRATROPIUM BROMIDE AND ALBUTEROL SULFATE 3 MILLILITER(S): .5; 2.5 SOLUTION RESPIRATORY (INHALATION) at 13:24

## 2025-03-20 RX ADMIN — ROSUVASTATIN CALCIUM 10 MILLIGRAM(S): 5 TABLET, FILM COATED ORAL at 22:35

## 2025-03-20 RX ADMIN — INSULIN LISPRO 2: 100 INJECTION, SOLUTION INTRAVENOUS; SUBCUTANEOUS at 08:25

## 2025-03-20 RX ADMIN — Medication 40 MILLIGRAM(S): at 06:28

## 2025-03-20 RX ADMIN — IPRATROPIUM BROMIDE AND ALBUTEROL SULFATE 3 MILLILITER(S): .5; 2.5 SOLUTION RESPIRATORY (INHALATION) at 22:36

## 2025-03-20 RX ADMIN — Medication 40 MILLIEQUIVALENT(S): at 08:31

## 2025-03-20 RX ADMIN — INSULIN LISPRO 8 UNIT(S): 100 INJECTION, SOLUTION INTRAVENOUS; SUBCUTANEOUS at 17:21

## 2025-03-20 RX ADMIN — CLOPIDOGREL BISULFATE 75 MILLIGRAM(S): 75 TABLET, FILM COATED ORAL at 11:59

## 2025-03-20 RX ADMIN — INSULIN GLARGINE-YFGN 20 UNIT(S): 100 INJECTION, SOLUTION SUBCUTANEOUS at 22:35

## 2025-03-20 RX ADMIN — INSULIN LISPRO 8 UNIT(S): 100 INJECTION, SOLUTION INTRAVENOUS; SUBCUTANEOUS at 11:59

## 2025-03-20 RX ADMIN — INSULIN LISPRO 2: 100 INJECTION, SOLUTION INTRAVENOUS; SUBCUTANEOUS at 11:59

## 2025-03-20 RX ADMIN — METOPROLOL SUCCINATE 25 MILLIGRAM(S): 50 TABLET, EXTENDED RELEASE ORAL at 06:28

## 2025-03-20 RX ADMIN — IPRATROPIUM BROMIDE AND ALBUTEROL SULFATE 3 MILLILITER(S): .5; 2.5 SOLUTION RESPIRATORY (INHALATION) at 08:31

## 2025-03-20 RX ADMIN — TAMSULOSIN HYDROCHLORIDE 0.4 MILLIGRAM(S): 0.4 CAPSULE ORAL at 22:36

## 2025-03-20 RX ADMIN — INSULIN LISPRO 5: 100 INJECTION, SOLUTION INTRAVENOUS; SUBCUTANEOUS at 17:22

## 2025-03-20 RX ADMIN — Medication 1 DOSE(S): at 17:22

## 2025-03-20 RX ADMIN — Medication 81 MILLIGRAM(S): at 11:59

## 2025-03-20 NOTE — PROGRESS NOTE ADULT - PROBLEM SELECTOR PLAN 1
likely due to overdiuresis - recently started on metolazone which he took for ~8 days in addition to bumex 4mg bid.   s/p 500cc LR in ED   will hold bumex and metolazone for now but will likely need to restart in am - he still has RLE edema  cough suspect due to viral infection   monitor vitals [id number] for now  Crads renal following   Hold Diuretics likely due to overdiuresis - recently started on metolazone which he took for ~8 days in addition to bumex 4mg bid.   s/p 500cc LR in ED   Hold  bumex and metolazone for now   cough suspect due to viral infection   monitor vitals [id number] for now  Cards Renal following   Hold Diuretics

## 2025-03-20 NOTE — PROGRESS NOTE ADULT - PROBLEM SELECTOR PLAN 2
likely due to overdiuresis   will hold diuretics for now   check bladder scan   urine studies  renal consult called likely due to overdiuresis   will hold diuretics for now   Renal consulted

## 2025-03-20 NOTE — PATIENT PROFILE ADULT - FALL HARM RISK - DEVICES
Patient Education     Treating De Quervain Tenosynovitis  The goal of your treatment is to ease your pain and allow you to use your thumb again. Treatment will depend on how bad the pain is.  Nonsurgical Treatment  Just taking a break from the activities that caused your pain may be enough. Your healthcare provider may also have you take nonsteroidal, anti-inflammatory medicine (NSAIDs), such as ibuprofen. Or you may wear a splint for a few weeks to rest the thumb and wrist. To lesson swelling, your healthcare provider may inject an anti-inflammatory medicine, such as cortisone, around the tendons. You may have more pain at first. But in a few days your thumb should feel better.    Surgery  If other kinds of treatment don’t ease your pain, or if the pain is bad, you may need surgery. The sheath that surrounds the tendons is released so the tendons can move more easily. This helps reduce the inflammation. It also allows you to straighten your thumb without pain. Surgery is done with local or regional anesthetic on an outpatient basis. So you can go home the same day. You will likely have a splint or dressing on your wrist for a few days while the tissue heals. You may need physical therapy to help strengthen muscles. Your healthcare provider will talk with you about the risks and possible complications of surgery.  Date Last Reviewed: 1/1/2018 © 2000-2018 The "[employer]. [address] [place] [address], [place] [address]. All rights reserved. This information is not intended as a substitute for professional medical care. Always follow your healthcare professional's instructions.            Cane/Walker

## 2025-03-20 NOTE — PROGRESS NOTE ADULT - SUBJECTIVE AND OBJECTIVE BOX
DATE OF SERVICE: 03-20-25 @ 16:45    Patient is a 64y old  Male who presents with a chief complaint of sent in from PCP office for hypotension (20 Mar 2025 11:02)      INTERVAL HISTORY: Feels ok.     REVIEW OF SYSTEMS:  CONSTITUTIONAL: No weakness  EYES/ENT: No visual changes;  No throat pain   NECK: No pain or stiffness  RESPIRATORY: No cough, wheezing; No shortness of breath  CARDIOVASCULAR: No chest pain or palpitations  GASTROINTESTINAL: No abdominal  pain. No nausea, vomiting, or hematemesis  GENITOURINARY: No dysuria, frequency or hematuria  NEUROLOGICAL: No stroke like symptoms  SKIN: No rashes    TELEMETRY Personally reviewed: SR 1st AVB 70-90  	  MEDICATIONS:  metoprolol succinate ER 25 milliGRAM(s) Oral daily        PHYSICAL EXAM:  T(C): 36.5 (03-20-25 @ 16:38), Max: 36.6 (03-19-25 @ 17:05)  HR: 85 (03-20-25 @ 16:38) (70 - 90)  BP: 96/61 (03-20-25 @ 16:38) (94/55 - 105/68)  RR: 18 (03-20-25 @ 16:38) (18 - 18)  SpO2: 94% (03-20-25 @ 16:38) (94% - 97%)  Wt(kg): --  I&O's Summary    20 Mar 2025 07:01  -  20 Mar 2025 16:45  --------------------------------------------------------  IN: 400 mL / OUT: 0 mL / NET: 400 mL          Appearance: In no distress	  HEENT:    PERRL, EOMI	  Cardiovascular:  S1 S2, No JVD  Respiratory: Lungs clear to auscultation	  Gastrointestinal:  Soft, Non-tender, + BS	  Vascularature:  No edema of LE  Psychiatric: Appropriate affect   Neuro: no acute focal deficits                               12.9   8.55  )-----------( 174      ( 20 Mar 2025 04:46 )             42.3     03-20    133[L]  |  87[L]  |  116[H]  ----------------------------<  168[H]  3.4[L]   |  26  |  3.76[H]    Ca    9.6      20 Mar 2025 04:46    TPro  8.3  /  Alb  4.0  /  TBili  0.9  /  DBili  x   /  AST  17  /  ALT  13  /  AlkPhos  167[H]  03-19        Labs personally reviewed      ASSESSMENT/PLAN: 	    64-year-old male patient past medical history of Heart Failure with reduced EF% 20 (most recent echo Sept 2024), essential HTN, CKD3, type 2 DM on high doses of bedtime and on fluctuating preprandial insulin (past episode of hypoglycemia), CAD with past PCI on ASA and Plavix, LEFT BKA in [facility] in May 2024, last discharge from [facility] Sept 2024 who was brought in via EMS for hypotension.  Patient was evaluated at his PCPs office when he was found systolic in the 80s.  The past 2 days the patient has been experiencing lethargy and fatigue. Denies any fevers, chest pain, shortness of breath, right leg edema.  Does admit to dry cough.  On exam, found mentating AAOx3 with BP 90s to 100 systolic.  Endorses normal appetite.  Prior to today patient has been compliant with his home medication and diuretics.  Hypertension possibly from over diuresis at home versus infectious etiology.      Problem/Plan - 1:  ·  Problem: Hypotension  ·  Plan:  p/w low BP i/s/o recent viral infection and started on Metolazone daily dosing 3 days PTA by PCP  - Suspect volume depletion 2/2addition of Metolazone especially given MANJIT on CKD  - POCUS with predominant A lines--> obtain formal TTE  - Check BNP  - Hold sHF GDMT including diuretics  - s/p albumin administration    Problem/Plan - 2: Elevated Troponin   ·  Plan:  Patient denies CP or SOB  - Will review ECG  - Possibly i/s/o decreased clearance given MANJIT on CKD  - Recent NST as noted below with predominant infarct  - c/w ASA, Rosuvastatin    Problem/Plan - 3: Chronic systolic heart failure.   ·  Plan:    - TTE 9/2024 with known EF 20% unchanged from prior  - Hold below sHF GDMT  given hypotension. Will likely have to modify diuretics for DC  --- Toprol 25mg daily    --- Entresto 24/26mg BID   --- Aldactone 25mg as OP  --- Jardiance 10mg daily  --- Bumex 2mg PO BID  --- Will not retsart Metolazone  - As per wife, dry weight ~247lbs  - Plan for [facility] 3/21    Problem/Plan - 4:  ·  Problem: CAD (coronary artery disease).   ·  Plan: c/w asa and statin  - Ordered Nuclear Stress test given CAD and WCT seen on tele  - NM stress test with large-sized, moderate to severe defect(s) in the mid to distal anterior, septal, apical and inferior walls that are predominantly fixed consistent with an infarction with minimal chantel-infarct ischemia.  - Given predominant infarct no role for cardiac cath    Problem/Plan - 5:  ·  Problem: MANJIT on CKD.   ·  Plan: Monitor BMP daily, dose meds per renal fx, avoid nephrotoxins.  - Hold Bumex, Jardiance, aldactone and Entresto given hypotension and MANJIT    Problem/Plan  6:  ·  Problem: ICD  ·  Plan:   s/p ICD extraction and re-implant, (9/3)             Gertrude Villalobos, AG-NP   Shawn Barnes DO [facility]  Cardiovascular Medicine  [address], [address]  Available through call or text on [employer]  Office: [phone]

## 2025-03-20 NOTE — PROGRESS NOTE ADULT - ASSESSMENT
64M h/o CHF with severely reduced LV function s/p AICD, CAD s/p PCI, DM2, L BKA, CKD3, COPD, GERD sent in to ED by PCP office for hypotension. the patient states he was in heart failure a few days ago and was started on metolazone 3 days ago by his pcp (metolazone filled 11 days ago). Wife states he was taking metolazone daily for about a week and lost about 40 lbs in that time. he has been taking all of his medications as prescribed. He developed a cough with shortness of breath about a week ago and was given a zpak which he finished. +sick contact- his grandchild has a viral infection. Overall, he feels slightly better but coughing has not improved. cough productive of greenish sputum at times though that has decreased. He denies any fever or chills. he has been taking his prn albuterol about 3x/day recently. currently, he denies any chest pain, sob, abdominal pain, change in appetite. he does c/o the persistent cough and feels he is unable to bring up sputum.   noticed with high creatinine      1- MANJIT   2- chf hx   3- hypotension on admission     manjit in setting of pre renal azotemia due to diuretics and decrease perfusion due to hypotension   creatinine worsening   bp is now baseline  received ivf and albumin  hold further diuretics  repeat bmp 2pm  heme outpt for paraproteinemia   strict I/O

## 2025-03-20 NOTE — PROGRESS NOTE ADULT - SUBJECTIVE AND OBJECTIVE BOX
Patient is a 64y old  Male who presents with a chief complaint of sent in from PCP office for hypotension (20 Mar 2025 19:12)      SUBJECTIVE / OVERNIGHT EVENTS:     MEDICATIONS  (STANDING):  albuterol/ipratropium for Nebulization 3 milliLiter(s) Nebulizer every 8 hours  aspirin enteric coated 81 milliGRAM(s) Oral daily  clopidogrel Tablet 75 milliGRAM(s) Oral daily  dextrose 5%. 1000 milliLiter(s) (50 mL/Hr) IV Continuous <Continuous>  dextrose 50% Injectable 25 Gram(s) IV Push once  dextrose 50% Injectable 25 Gram(s) IV Push once  fluticasone propionate/ salmeterol 250-50 MICROgram(s) Diskus 1 Dose(s) Inhalation two times a day  glucagon  Injectable 1 milliGRAM(s) IntraMuscular once  insulin glargine Injectable (LANTUS) 20 Unit(s) SubCutaneous at bedtime  insulin lispro (ADMELOG) corrective regimen sliding scale   SubCutaneous at bedtime  insulin lispro (ADMELOG) corrective regimen sliding scale   SubCutaneous three times a day before meals  insulin lispro Injectable (ADMELOG) 8 Unit(s) SubCutaneous before breakfast  insulin lispro Injectable (ADMELOG) 8 Unit(s) SubCutaneous before lunch  insulin lispro Injectable (ADMELOG) 8 Unit(s) SubCutaneous before dinner  metoprolol succinate ER 25 milliGRAM(s) Oral daily  pantoprazole    Tablet 40 milliGRAM(s) Oral before breakfast  rosuvastatin 10 milliGRAM(s) Oral at bedtime  tamsulosin 0.4 milliGRAM(s) Oral at bedtime    MEDICATIONS  (PRN):  acetaminophen     Tablet .. 650 milliGRAM(s) Oral every 6 hours PRN Temp greater or equal to 38C (100.4F), Mild Pain (1 - 3)  dextrose Oral Gel 15 Gram(s) Oral once PRN Blood Glucose LESS THAN 70 milliGRAM(s)/deciliter  hydrocodone/homatropine Syrup 5 milliLiter(s) Oral every 6 hours PRN Cough  melatonin 3 milliGRAM(s) Oral at bedtime PRN Insomnia      CAPILLARY BLOOD GLUCOSE      POCT Blood Glucose.: 139 mg/dL (20 Mar 2025 21:06)  POCT Blood Glucose.: 388 mg/dL (20 Mar 2025 17:08)  POCT Blood Glucose.: 241 mg/dL (20 Mar 2025 11:26)  POCT Blood Glucose.: 203 mg/dL (20 Mar 2025 07:26)    I&O's Summary    20 Mar 2025 07:01  -  20 Mar 2025 22:11  --------------------------------------------------------  IN: 400 mL / OUT: 400 mL / NET: 0 mL      T(C): 36.5 (03-20-25 @ 20:39), Max: 36.5 (03-20-25 @ 11:08)  HR: 87 (03-20-25 @ 20:39) (70 - 87)  BP: 94/62 (03-20-25 @ 20:39) (94/62 - 97/59)  RR: 18 (03-20-25 @ 20:39) (18 - 18)  SpO2: 96% (03-20-25 @ 20:39) (94% - 97%)    PHYSICAL EXAM:  GENERAL: NAD, well-developed  HEAD:  Atraumatic, Normocephalic  EYES: EOMI, PERRLA, conjunctiva and sclera clear  NECK: Supple, No JVD  CHEST/LUNG: Clear to auscultation bilaterally; No wheeze  HEART: Regular rate and rhythm; No murmurs, rubs, or gallops  ABDOMEN: Soft, Nontender, Nondistended; Bowel sounds present  EXTREMITIES:  2+ Peripheral Pulses, No clubbing, cyanosis, or edema  PSYCH: AAOx3  NEUROLOGY: non-focal  SKIN: No rashes or lesions    LABS:                        12.9   8.55  )-----------( 174      ( 20 Mar 2025 04:46 )             42.3     03-20    132[L]  |  87[L]  |  115[H]  ----------------------------<  328[H]  3.9   |  27  |  3.23[H]    Ca    9.5      20 Mar 2025 17:30    TPro  8.3  /  Alb  4.0  /  TBili  0.9  /  DBili  x   /  AST  17  /  ALT  13  /  AlkPhos  167[H]  03-19    PT/INR - ( 19 Mar 2025 12:11 )   PT: 13.2 sec;   INR: 1.15 ratio         PTT - ( 19 Mar 2025 12:11 )  PTT:33.7 sec      Urinalysis Basic - ( 20 Mar 2025 17:30 )    Color: x / Appearance: x / SG: x / pH: x  Gluc: 328 mg/dL / Ketone: x  / Bili: x / Urobili: x   Blood: x / Protein: x / Nitrite: x   Leuk Esterase: x / RBC: x / WBC x   Sq Epi: x / Non Sq Epi: x / Bacteria: x        RADIOLOGY & ADDITIONAL TESTS:    Imaging Personally Reviewed:    Consultant(s) Notes Reviewed:      Care Discussed with Consultants/Other Providers:

## 2025-03-20 NOTE — CONSULT NOTE ADULT - ASSESSMENT
SEVERIANO MARTINEZ is a 64y Male presenting to the hospital with hypotension. Patient has NYHA class III HFrEF and would benefit from home monitoring of PA pressures.    #NYHA Class III HFrEF  - patient would benefit from home monitoring of PA pressures given frequent diuretic adjustments and travelling between [place] and [facility]/B/ discussed with patient and he is agreeable to proceed, will schedule tomorrow afternoon  - implantation will require minimal contrast, nephrology on board and ok with proceeding  - plan for implant tomorrow afternoon, patient can have breakfast, clears after breakfast is acceptable    Care plan discussed with patient at bedside and rounding cardiology service.    ________________  Raul Gonzales MD  Interventional & Structural Cardiology
64M h/o CHF with severely reduced LV function s/p AICD, CAD s/p PCI, DM2, L BKA, CKD3, COPD, GERD sent in to ED by PCP office for hypotension. the patient states he was in heart failure a few days ago and was started on metolazone 3 days ago by his pcp (metolazone filled 11 days ago). Wife states he was taking metolazone daily for about a week and lost about 40 lbs in that time. he has been taking all of his medications as prescribed. He developed a cough with shortness of breath about a week ago and was given a zpak which he finished. +sick contact- his grandchild has a viral infection. Overall, he feels slightly better but coughing has not improved. cough productive of greenish sputum at times though that has decreased. He denies any fever or chills. he has been taking his prn albuterol about 3x/day recently. currently, he denies any chest pain, sob, abdominal pain, change in appetite. he does c/o the persistent cough and feels he is unable to bring up sputum.   noticed with high creatinine      1- MANJIT   2- chf hx   3- hypotension on admission     manjit in setting of pre renal azotemia due to diuretics and decrease perfusion due to hypotension   s.p IVF  hold further ivf as bp is better  hold diuretics today   to have urinalysis and urine pro/cr ratio   heme outpt for paraproteinemia  hold diuretics today

## 2025-03-20 NOTE — PROGRESS NOTE ADULT - SUBJECTIVE AND OBJECTIVE BOX
[facility]   [phone]  RENAL FOLLOW UP NOTE  --------------------------------------------------------------------------------  Chief Complaint:    24 hour events/subjective:    patient seen and examined.   denies sob    PAST HISTORY  --------------------------------------------------------------------------------  No significant changes to PMH, PSH, FHx, SHx, unless otherwise noted    ALLERGIES & MEDICATIONS  --------------------------------------------------------------------------------  Allergies    doxepin (Other)  Zosyn (Pruritus)  ceftriaxone (Rash)  vancomycin (Rash (Mild to Mod))  penicillins (Short breath; Rash)    Intolerances      Standing Inpatient Medications  albuterol/ipratropium for Nebulization 3 milliLiter(s) Nebulizer every 8 hours  aspirin enteric coated 81 milliGRAM(s) Oral daily  clopidogrel Tablet 75 milliGRAM(s) Oral daily  dextrose 5%. 1000 milliLiter(s) IV Continuous <Continuous>  dextrose 50% Injectable 25 Gram(s) IV Push once  dextrose 50% Injectable 25 Gram(s) IV Push once  fluticasone propionate/ salmeterol 250-50 MICROgram(s) Diskus 1 Dose(s) Inhalation two times a day  glucagon  Injectable 1 milliGRAM(s) IntraMuscular once  insulin glargine Injectable (LANTUS) 20 Unit(s) SubCutaneous at bedtime  insulin lispro (ADMELOG) corrective regimen sliding scale   SubCutaneous at bedtime  insulin lispro (ADMELOG) corrective regimen sliding scale   SubCutaneous three times a day before meals  insulin lispro Injectable (ADMELOG) 8 Unit(s) SubCutaneous before breakfast  insulin lispro Injectable (ADMELOG) 8 Unit(s) SubCutaneous before lunch  insulin lispro Injectable (ADMELOG) 8 Unit(s) SubCutaneous before dinner  metoprolol succinate ER 25 milliGRAM(s) Oral daily  pantoprazole    Tablet 40 milliGRAM(s) Oral before breakfast  rosuvastatin 10 milliGRAM(s) Oral at bedtime  tamsulosin 0.4 milliGRAM(s) Oral at bedtime    PRN Inpatient Medications  acetaminophen     Tablet .. 650 milliGRAM(s) Oral every 6 hours PRN  dextrose Oral Gel 15 Gram(s) Oral once PRN  hydrocodone/homatropine Syrup 5 milliLiter(s) Oral every 6 hours PRN  melatonin 3 milliGRAM(s) Oral at bedtime PRN      REVIEW OF SYSTEMS  --------------------------------------------------------------------------------    Gen: denies fevers/chills,  CVS: denies chest pain/palpitations  Resp: denies SOB/Cough  GI: Denies N/V/Abd pain  : Denies dysuria    VITALS/PHYSICAL EXAM  --------------------------------------------------------------------------------  T(C): 36.4 (03-20-25 @ 04:00), Max: 36.7 (03-19-25 @ 12:05)  HR: 76 (03-20-25 @ 04:00) (76 - 92)  BP: 105/68 (03-20-25 @ 05:45) (82/56 - 107/67)  RR: 18 (03-20-25 @ 04:00) (18 - 23)  SpO2: 94% (03-20-25 @ 04:00) (94% - 97%)  Wt(kg): --  Height (cm): 195.6 (03-19-25 @ 11:45)  Weight (kg): 108.9 (03-19-25 @ 11:45)  BMI (kg/m2): 28.5 (03-19-25 @ 11:45)  BSA (m2): 2.42 (03-19-25 @ 11:45)      Physical Exam:    	Gen: Non toxic comfortable appearing   	Pulm: decrease bs  no rales or ronchi or wheezing  	CV: No JVD. RRR, S1S2; no rub  	Abd: +BS, soft, nontender/nondistended  	: No suprapubic tenderness  	UE: Warm, no cyanosis  no clubbing,  no edema; no asterixis  	LE: Warm, no cyanosis  no clubbing, no edema L BKA   	Neuro: alert and oriented. speech coherent     LABS/STUDIES  --------------------------------------------------------------------------------              12.9   8.55  >-----------<  174      [03-20-25 @ 04:46]              42.3     133  |  87  |  116  ----------------------------<  168      [03-20-25 @ 04:46]  3.4   |  26  |  3.76        Ca     9.6     [03-20-25 @ 04:46]    TPro  8.3  /  Alb  4.0  /  TBili  0.9  /  DBili  x   /  AST  17  /  ALT  13  /  AlkPhos  167  [03-19-25 @ 12:11]    PT/INR: PT 13.2 , INR 1.15       [03-19-25 @ 12:11]  PTT: 33.7       [03-19-25 @ 12:11]      Creatinine Trend:  SCr 3.76 [03-20 @ 04:46]  SCr 2.93 [03-19 @ 15:03]  SCr 3.18 [03-19 @ 12:11]              Urinalysis - [03-20-25 @ 07:19]      Color Yellow / Appearance Clear / SG 1.014 / pH 5.5      Gluc 500 / Ketone Negative  / Bili Negative / Urobili 1.0       Blood Negative / Protein Negative / Leuk Est Negative / Nitrite Negative      RBC  / WBC  / Hyaline  / Gran  / Sq Epi  / Non Sq Epi  / Bacteria     Urine Creatinine 85      [03-20-25 @ 07:19]  Urine Protein 13      [03-20-25 @ 07:19]  Urine Sodium 64      [03-20-25 @ 07:19]  Urine Urea Nitrogen 463      [03-20-25 @ 07:19]    Iron 24, TIBC 395, %sat 6      [08-29-24 @ 06:04]  Ferritin 51      [08-29-24 @ 07:31]    < from: Xray Chest 1 View- PORTABLE-Urgent (03.19.25 @ 12:28) >  ACC: [id number] EXAM:  XR CHEST PORTABLE URGENT 1V   ORDERED BY: KAVITA MARK     PROCEDURE DATE:  03/19/2025          INTERPRETATION:  EXAMINATION: XR CHEST URGENT    CLINICAL INDICATION: Sepsis    TECHNIQUE: Single frontal view of thechest was obtained.    COMPARISON: Chest x-ray 12/17/2024.    FINDINGS:    Left chest wall subcutaneous AICD, partially imaged.  Moderate right pleural effusion with associated atelectasis.  No pneumothorax. Left lung is clear.  The heart size cannotbe accurately assessed on this projection.  No acute osseous abnormalities.    IMPRESSION:  Moderate right pleural effusion with hazy opacity, likely atelectasis. It   is not significantly changed from the prior study. Underlying infection   is not totally excluded.    --- End of Report ---      < end of copied text >

## 2025-03-20 NOTE — PROGRESS NOTE ADULT - ASSESSMENT
64M h/o CHF with severely reduced LV function s/p AICD, CAD s/p PCI, DM2, L BKA, CKD3, COPD, GERD sent in to ED by PCP office for hypotension likely due to overdiuresis found to have MANJIT on CKD.

## 2025-03-20 NOTE — CONSULT NOTE ADULT - NS ATTEST RISK PROBLEM GEN_ALL_CORE FT
Acute on chronic heart failure with reduced ejection fraction requiring acute hospitalization and management. Review of chart including labs/imaging/consultant notes.  Discussion with inpatient and outpatient providers.  Coordination of care.

## 2025-03-20 NOTE — CONSULT NOTE ADULT - SUBJECTIVE AND OBJECTIVE BOX
HPI:  patient is a poor historian, some history obtained with collateral from wife  64M h/o CHF with severely reduced LV function s/p AICD, CAD s/p PCI, DM2, L BKA, CKD3, COPD, GERD sent in to ED by PCP office for hypotension. the patient states he was in heart failure a few days ago and was started on metolazone 3 days ago by his pcp (metolazone filled 11 days ago). Wife states he was taking metolazone daily for about a week and lost about 40 lbs in that time. he has been taking all of his medications as prescribed. He developed a cough with shortness of breath about a week ago and was given a zpak which he finished. +sick contact- his grandchild has a viral infection. Overall, he feels slightly better but coughing has not improved. cough productive of greenish sputum at times though that has decreased. He denies any fever or chills. he has been taking his prn albuterol about 3x/day recently. currently, he denies any chest pain, sob, abdominal pain, change in appetite. he does c/o the persistent cough and feels he is unable to bring up sputum.  (19 Mar 2025 16:56)    Patient has NYHA Class III heart failure with acute on chronic heart failure requiring admission.  He spends a portion of his time in [place] and the remainder in [place] helping to care for his grandchild.  We discussed PA monitoring including r/b/a.  After discussion, patient is agreeable to proceed.      PMHx:   Stented coronary artery    Diabetes    AICD (automatic cardioverter/defibrillator) present    Hypertension    Heart failure with reduced ejection fraction    History of ischemic cardiomyopathy    History of COPD    H/O gastroesophageal reflux (GERD)    2019 novel coronavirus disease (COVID-19)    COVID-19 vaccine series completed        PSHx:   No significant past surgical history    H/O vasectomy        Allergies:  doxepin (Other)  Zosyn (Pruritus)  ceftriaxone (Rash)  vancomycin (Rash (Mild to Mod))  penicillins (Short breath; Rash)      Home Meds: As per admission medication reconcilliation.     Current Medications:   acetaminophen     Tablet .. 650 milliGRAM(s) Oral every 6 hours PRN  albuterol/ipratropium for Nebulization 3 milliLiter(s) Nebulizer every 8 hours  aspirin enteric coated 81 milliGRAM(s) Oral daily  clopidogrel Tablet 75 milliGRAM(s) Oral daily  dextrose 5%. 1000 milliLiter(s) IV Continuous <Continuous>  dextrose 50% Injectable 25 Gram(s) IV Push once  dextrose 50% Injectable 25 Gram(s) IV Push once  dextrose Oral Gel 15 Gram(s) Oral once PRN  fluticasone propionate/ salmeterol 250-50 MICROgram(s) Diskus 1 Dose(s) Inhalation two times a day  glucagon  Injectable 1 milliGRAM(s) IntraMuscular once  hydrocodone/homatropine Syrup 5 milliLiter(s) Oral every 6 hours PRN  insulin glargine Injectable (LANTUS) 20 Unit(s) SubCutaneous at bedtime  insulin lispro (ADMELOG) corrective regimen sliding scale   SubCutaneous at bedtime  insulin lispro (ADMELOG) corrective regimen sliding scale   SubCutaneous three times a day before meals  insulin lispro Injectable (ADMELOG) 8 Unit(s) SubCutaneous before breakfast  insulin lispro Injectable (ADMELOG) 8 Unit(s) SubCutaneous before lunch  insulin lispro Injectable (ADMELOG) 8 Unit(s) SubCutaneous before dinner  melatonin 3 milliGRAM(s) Oral at bedtime PRN  metoprolol succinate ER 25 milliGRAM(s) Oral daily  pantoprazole    Tablet 40 milliGRAM(s) Oral before breakfast  rosuvastatin 10 milliGRAM(s) Oral at bedtime  tamsulosin 0.4 milliGRAM(s) Oral at bedtime      FAMILY HISTORY:  Family history of COPD (chronic obstructive pulmonary disease) (Sibling)    Family history of cardiac disorder  Paternal        Social History:    REVIEW OF SYSTEMS:  Constitutional:     [x ] negative [ ] fevers [ ] chills [ ] weight loss [ ] weight gain  HEENT:                  [x ] negative [ ] dry eyes [ ] eye irritation [ ] postnasal drip [ ] nasal congestion  CV:                         [ x] negative  [ ] chest pain [ ] orthopnea [ ] palpitations [ ] murmur  Resp:                     [x ] negative [ ] cough [ ] shortness of breath [ ] dyspnea [ ] wheezing [ ] sputum [ ]hemoptysis  GI:                          [ x] negative [ ] nausea [ ] vomiting [ ] diarrhea [ ] constipation [ ] abd pain [ ] dysphagia   :                        [ x] negative [ ] dysuria [ ] nocturia [ ] hematuria [ ] increased urinary frequency  Musculoskeletal: [x ] negative [ ] back pain [ ] myalgias [ ] arthralgias [ ] fracture  Skin:                       [ x] negative [ ] rash [ ] itch  Neurological:        [ x] negative [ ] headache [ ] dizziness [ ] syncope [ ] weakness [ ] numbness  Psychiatric:           [ x] negative [ ] anxiety [ ] depression  Endocrine:            [ x] negative [ ] diabetes [ ] thyroid problem  Heme/Lymph:      [ x] negative [ ] anemia [ ] bleeding problem  Allergic/Immune: [ x] negative [ ] itchy eyes [ ] nasal discharge [ ] hives [ ] angioedema    [ x] All other systems negative  [ ] Unable to assess ROS due to      Physical Exam:  T(F): 97.7 (), Max: 97.8 ()  HR: 85 () (70 - 90)  BP: 96/61 () (96/61 - 105/68)  RR: 18 (-)  SpO2: 94% ()  General: Alert, no acute distress, appears comfortable   HEENT: No scleral icterus, EOMI, no facial dysmorphia, no external ear lesions   Cardiac: Regular rate and rhythm, no murmurs, no rubs, no gallops   Pulmonary: Clear breath sounds throughout, no wheezing, no stridor, no crackles   Abdomen: Nondistended, nontender, appears soft   Skin: no obvious rash or lesions   Extremities: L BKA, RLE chronic venous changes  Neurological: Moving all 4 extremities, no overt focal deficits noted   Psych: normal mood and affect     Cardiovascular Diagnostic Testing:    ECG: Personally reviewed:    Echo: Personally reviewed:    Stress Testing:    Cath:    Imaging:    CXR: Personally reviewed    Labs: Personally reviewed                        12.9   8.55  )-----------( 174      ( 20 Mar 2025 04:46 )             42.3         132[L]  |  87[L]  |  115[H]  ----------------------------<  328[H]  3.9   |  27  |  3.23[H]    Ca    9.5      20 Mar 2025 17:30    TPro  8.3  /  Alb  4.0  /  TBili  0.9  /  DBili  x   /  AST  17  /  ALT  13  /  AlkPhos  167[H]      PT/INR - ( 19 Mar 2025 12:11 )   PT: 13.2 sec;   INR: 1.15 ratio         PTT - ( 19 Mar 2025 12:11 )  PTT:33.7 sec    Total Cholesterol: 142  LDL: --  HDL: 27  T

## 2025-03-21 ENCOUNTER — TRANSCRIPTION ENCOUNTER (OUTPATIENT)
Age: 65
End: 2025-03-21

## 2025-03-21 LAB
ANION GAP SERPL CALC-SCNC: 19 MMOL/L — HIGH (ref 5–17)
BUN SERPL-MCNC: 111 MG/DL — HIGH (ref 7–23)
CALCIUM SERPL-MCNC: 9.7 MG/DL — SIGNIFICANT CHANGE UP (ref 8.4–10.5)
CHLORIDE SERPL-SCNC: 89 MMOL/L — LOW (ref 96–108)
CO2 SERPL-SCNC: 27 MMOL/L — SIGNIFICANT CHANGE UP (ref 22–31)
CREAT SERPL-MCNC: 2.94 MG/DL — HIGH (ref 0.5–1.3)
EGFR: 23 ML/MIN/1.73M2 — LOW
EGFR: 23 ML/MIN/1.73M2 — LOW
GLUCOSE BLDC GLUCOMTR-MCNC: 156 MG/DL — HIGH (ref 70–99)
GLUCOSE BLDC GLUCOMTR-MCNC: 202 MG/DL — HIGH (ref 70–99)
GLUCOSE BLDC GLUCOMTR-MCNC: 226 MG/DL — HIGH (ref 70–99)
GLUCOSE BLDC GLUCOMTR-MCNC: 325 MG/DL — HIGH (ref 70–99)
GLUCOSE SERPL-MCNC: 293 MG/DL — HIGH (ref 70–99)
HCT VFR BLD CALC: 42.9 % — SIGNIFICANT CHANGE UP (ref 39–50)
HGB BLD-MCNC: 13.1 G/DL — SIGNIFICANT CHANGE UP (ref 13–17)
MAGNESIUM SERPL-MCNC: 2.6 MG/DL — SIGNIFICANT CHANGE UP (ref 1.6–2.6)
MCHC RBC-ENTMCNC: 21.9 PG — LOW (ref 27–34)
MCHC RBC-ENTMCNC: 30.5 G/DL — LOW (ref 32–36)
MCV RBC AUTO: 71.7 FL — LOW (ref 80–100)
NRBC BLD AUTO-RTO: 0 /100 WBCS — SIGNIFICANT CHANGE UP (ref 0–0)
PHOSPHATE SERPL-MCNC: 3.9 MG/DL — SIGNIFICANT CHANGE UP (ref 2.5–4.5)
PLATELET # BLD AUTO: 187 K/UL — SIGNIFICANT CHANGE UP (ref 150–400)
POTASSIUM SERPL-MCNC: 4 MMOL/L — SIGNIFICANT CHANGE UP (ref 3.5–5.3)
POTASSIUM SERPL-SCNC: 4 MMOL/L — SIGNIFICANT CHANGE UP (ref 3.5–5.3)
RBC # BLD: 5.98 M/UL — HIGH (ref 4.2–5.8)
RBC # FLD: 18.4 % — HIGH (ref 10.3–14.5)
SODIUM SERPL-SCNC: 135 MMOL/L — SIGNIFICANT CHANGE UP (ref 135–145)
WBC # BLD: 7.64 K/UL — SIGNIFICANT CHANGE UP (ref 3.8–10.5)
WBC # FLD AUTO: 7.64 K/UL — SIGNIFICANT CHANGE UP (ref 3.8–10.5)

## 2025-03-21 PROCEDURE — 93010 ELECTROCARDIOGRAM REPORT: CPT

## 2025-03-21 RX ORDER — BUMETANIDE 1 MG/1
2 TABLET ORAL
Refills: 0 | Status: DISCONTINUED | OUTPATIENT
Start: 2025-03-21 | End: 2025-03-23

## 2025-03-21 RX ADMIN — IPRATROPIUM BROMIDE AND ALBUTEROL SULFATE 3 MILLILITER(S): .5; 2.5 SOLUTION RESPIRATORY (INHALATION) at 16:22

## 2025-03-21 RX ADMIN — BUMETANIDE 2 MILLIGRAM(S): 1 TABLET ORAL at 17:52

## 2025-03-21 RX ADMIN — TAMSULOSIN HYDROCHLORIDE 0.4 MILLIGRAM(S): 0.4 CAPSULE ORAL at 21:22

## 2025-03-21 RX ADMIN — CLOPIDOGREL BISULFATE 75 MILLIGRAM(S): 75 TABLET, FILM COATED ORAL at 12:21

## 2025-03-21 RX ADMIN — INSULIN LISPRO 2: 100 INJECTION, SOLUTION INTRAVENOUS; SUBCUTANEOUS at 12:20

## 2025-03-21 RX ADMIN — INSULIN LISPRO 8 UNIT(S): 100 INJECTION, SOLUTION INTRAVENOUS; SUBCUTANEOUS at 12:21

## 2025-03-21 RX ADMIN — IPRATROPIUM BROMIDE AND ALBUTEROL SULFATE 3 MILLILITER(S): .5; 2.5 SOLUTION RESPIRATORY (INHALATION) at 21:22

## 2025-03-21 RX ADMIN — INSULIN LISPRO 8 UNIT(S): 100 INJECTION, SOLUTION INTRAVENOUS; SUBCUTANEOUS at 07:59

## 2025-03-21 RX ADMIN — Medication 40 MILLIGRAM(S): at 06:35

## 2025-03-21 RX ADMIN — INSULIN GLARGINE-YFGN 20 UNIT(S): 100 INJECTION, SOLUTION SUBCUTANEOUS at 22:11

## 2025-03-21 RX ADMIN — METOPROLOL SUCCINATE 25 MILLIGRAM(S): 50 TABLET, EXTENDED RELEASE ORAL at 06:35

## 2025-03-21 RX ADMIN — INSULIN LISPRO 2: 100 INJECTION, SOLUTION INTRAVENOUS; SUBCUTANEOUS at 16:23

## 2025-03-21 RX ADMIN — Medication 1 DOSE(S): at 17:52

## 2025-03-21 RX ADMIN — ROSUVASTATIN CALCIUM 10 MILLIGRAM(S): 5 TABLET, FILM COATED ORAL at 21:22

## 2025-03-21 RX ADMIN — INSULIN LISPRO 8 UNIT(S): 100 INJECTION, SOLUTION INTRAVENOUS; SUBCUTANEOUS at 16:23

## 2025-03-21 RX ADMIN — INSULIN LISPRO 4: 100 INJECTION, SOLUTION INTRAVENOUS; SUBCUTANEOUS at 07:59

## 2025-03-21 RX ADMIN — IPRATROPIUM BROMIDE AND ALBUTEROL SULFATE 3 MILLILITER(S): .5; 2.5 SOLUTION RESPIRATORY (INHALATION) at 10:47

## 2025-03-21 RX ADMIN — Medication 81 MILLIGRAM(S): at 12:20

## 2025-03-21 RX ADMIN — Medication 1 DOSE(S): at 10:47

## 2025-03-21 NOTE — DISCHARGE NOTE PROVIDER - NSDCMRMEDTOKEN_GEN_ALL_CORE_FT
Albuterol (Eqv-ProAir HFA) 90 mcg/inh inhalation aerosol: 2 puff(s) inhaled every 6 hours as needed  aspirin 81 mg oral delayed release tablet: 1 tab(s) orally once a day in the morning  bumetanide 2 mg oral tablet: 2 tab(s) orally 2 times a day (in the morning and in the afternoon)  clopidogrel 75 mg oral tablet: 1 tab(s) orally once a day in the morning  Entresto 24 mg-26 mg oral tablet: 1 tab(s) orally 2 times a day  HumaLOG 100 units/mL injectable solution: 20 unit(s) subcutaneous 3 times a day (before meals)  Jardiance 10 mg oral tablet: 1 tab(s) orally once a day in the morning  Lantus 100 units/mL subcutaneous solution: 40 unit(s) subcutaneous once a day (at bedtime)  metOLazone 5 mg oral tablet: 1 tab(s) orally once a day  metoprolol succinate 25 mg oral capsule, extended release: 1 cap(s) orally once a day (at bedtime)  pantoprazole 40 mg oral delayed release tablet: 1 tab(s) orally once a day in the morning  rosuvastatin 10 mg oral tablet: 1 tab(s) orally once a day  spironolactone 25 mg oral tablet: 1 tab(s) orally once a day  tamsulosin 0.4 mg oral capsule: 1 cap(s) orally once a day (at bedtime)  Wixela Inhub 250 mcg-50 mcg/inh inhalation powder: 1 puff(s) inhaled 2 times a day   Albuterol (Eqv-ProAir HFA) 90 mcg/inh inhalation aerosol: 2 puff(s) inhaled every 6 hours as needed  aspirin 81 mg oral delayed release tablet: 1 tab(s) orally once a day in the morning  bumetanide 2 mg oral tablet: 2 tab(s) orally 2 times a day (in the morning and in the afternoon)  clopidogrel 75 mg oral tablet: 1 tab(s) orally once a day in the morning  dapagliflozin 10 mg oral tablet: 1 tab(s) orally once a day  Entresto 24 mg-26 mg oral tablet: 1 tab(s) orally 2 times a day  HumaLOG 100 units/mL injectable solution: 20 unit(s) subcutaneous 3 times a day (before meals)  Jardiance 10 mg oral tablet: 1 tab(s) orally once a day in the morning  Lantus 100 units/mL subcutaneous solution: 40 unit(s) subcutaneous once a day (at bedtime)  metOLazone 5 mg oral tablet: 1 tab(s) orally once a day  metoprolol succinate 25 mg oral capsule, extended release: 1 cap(s) orally once a day (at bedtime)  pantoprazole 40 mg oral delayed release tablet: 1 tab(s) orally once a day in the morning  rosuvastatin 10 mg oral tablet: 1 tab(s) orally once a day  spironolactone 25 mg oral tablet: 1 tab(s) orally once a day  tamsulosin 0.4 mg oral capsule: 1 cap(s) orally once a day (at bedtime)  Wixela Inhub 250 mcg-50 mcg/inh inhalation powder: 1 puff(s) inhaled 2 times a day   Albuterol (Eqv-ProAir HFA) 90 mcg/inh inhalation aerosol: 2 puff(s) inhaled every 6 hours as needed  aspirin 81 mg oral delayed release tablet: 1 tab(s) orally once a day in the morning  bumetanide 2 mg oral tablet: 2 tab(s) orally 2 times a day (in the morning and in the afternoon)  clopidogrel 75 mg oral tablet: 1 tab(s) orally once a day in the morning  Entresto 24 mg-26 mg oral tablet: 1 tab(s) orally 2 times a day  HumaLOG 100 units/mL injectable solution: 20 unit(s) subcutaneous 3 times a day (before meals)  Jardiance 10 mg oral tablet: 1 tab(s) orally once a day in the morning  Lantus 100 units/mL subcutaneous solution: 40 unit(s) subcutaneous once a day (at bedtime)  metoprolol succinate 25 mg oral capsule, extended release: 1 cap(s) orally once a day (at bedtime)  pantoprazole 40 mg oral delayed release tablet: 1 tab(s) orally once a day in the morning  rosuvastatin 10 mg oral tablet: 1 tab(s) orally once a day  spironolactone 25 mg oral tablet: 1 tab(s) orally once a day  tamsulosin 0.4 mg oral capsule: 1 cap(s) orally once a day (at bedtime)  Wixela Inhub 250 mcg-50 mcg/inh inhalation powder: 1 puff(s) inhaled 2 times a day   Albuterol (Eqv-ProAir HFA) 90 mcg/inh inhalation aerosol: 2 puff(s) inhaled every 6 hours as needed  aspirin 81 mg oral delayed release tablet: 1 tab(s) orally once a day in the morning  bumetanide 2 mg oral tablet: 1 tab(s) orally 2 times a day (in the morning and in the afternoon)  clopidogrel 75 mg oral tablet: 1 tab(s) orally once a day in the morning  Entresto 24 mg-26 mg oral tablet: 1 tab(s) orally 2 times a day  HumaLOG 100 units/mL injectable solution: 20 unit(s) subcutaneous 3 times a day (before meals)  Jardiance 10 mg oral tablet: 1 tab(s) orally once a day in the morning  Lantus 100 units/mL subcutaneous solution: 40 unit(s) subcutaneous once a day (at bedtime)  metoprolol succinate 25 mg oral capsule, extended release: 1 cap(s) orally once a day (at bedtime)  pantoprazole 40 mg oral delayed release tablet: 1 tab(s) orally once a day in the morning  rosuvastatin 10 mg oral tablet: 1 tab(s) orally once a day  spironolactone 25 mg oral tablet: 1 tab(s) orally once a day  tamsulosin 0.4 mg oral capsule: 1 cap(s) orally once a day (at bedtime)  Wixela Inhub 250 mcg-50 mcg/inh inhalation powder: 1 puff(s) inhaled 2 times a day

## 2025-03-21 NOTE — DISCHARGE NOTE PROVIDER - HOSPITAL COURSE
Hospital Course:    64-year-old male patient past medical history of Heart Failure with reduced EF% 20 (most recent echo 2024), essential HTN, CKD3, type 2 DM on high doses of bedtime and on fluctuating preprandial insulin (past episode of hypoglycemia), CAD with past PCI on ASA and Plavix, LEFT BKA in [facility] in May 2024, last discharge from [place] 2024 who was brought in via EMS for hypotension.  Patient was evaluated at his PCPs office when he was found systolic in the 80s.  The past 2 days the patient has been experiencing lethargy and fatigue. Denies any fevers, chest pain, shortness of breath, right leg edema.  Does admit to dry cough.  On exam, found mentating AAOx3 with BP 90s to 100 systolic.  Endorses normal appetite.  Prior to today patient has been compliant with his home medication and diuretics.  Pt p/w low BP i/s/o recent viral infection and started on Metolazone daily dosing 3 days PTA by PCP. Suspect volume depletion 2/ addition of Metolazone especially given MANJIT on CKD. Hold sHF GDMT including diuretics. S/p albumin administration. 3/21 restart home dose Bumex s/p RHC. Pt with elevated troponin possibly i/s/o decreased clearance given MANJIT on CKD. Recent NST with predominant infarct, no role for cardiac cath. C/w ASA, Rosuvastatin. S/p CardioMems 3/21. RHC 3/21 - Ra 12, RV 50/11, PCWP 21, PA 51/27/37. Restarted home dose Bumex 2mg PO BID given mildly elevated PCWP. Held Jardiance, aldactone and Entresto given hypotension and MANJIT. MANJIT improved.    Medically cleared for discharge home with home care per Dr. Sow.    Weight - Discharge/Trend:  Weight in k.6 (25 @ 07:59)    Documented EF: 20%    Guideline Directed Medical Therapy Prescribed/Reason why not prescribed:  B-Blocker: Toprol XL 25 mg qd  ARNI/ACE-I/ARB: on hold for hypotension  MRA: on hold for hypotension  SGLT2 inhibitor: on hold for hypotension    Appointment scheduled within 7 days? per patient access services  [X] YES [ ] NO    Active or Pending Issues Requiring Follow-up: f/u cards, nephrology, PCP    Advanced Directives:   [X] Full code  [ ] DNR  [ ] Hospice    Discharge Diagnoses:  Chronic systolic heart failure with renal involvement Hospital Course:    64-year-old male patient past medical history of Heart Failure with reduced EF% 20 (most recent echo 2024), essential HTN, CKD3, type 2 DM on high doses of bedtime and on fluctuating preprandial insulin (past episode of hypoglycemia), CAD with past PCI on ASA and Plavix, LEFT BKA in [facility] in May 2024, last discharge from [place] 2024 who was brought in via EMS for hypotension.  Patient was evaluated at his PCPs office when he was found systolic in the 80s.  The past 2 days the patient has been experiencing lethargy and fatigue. Denies any fevers, chest pain, shortness of breath, right leg edema.  Does admit to dry cough.  On exam, found mentating AAOx3 with BP 90s to 100 systolic.  Endorses normal appetite.  Prior to today patient has been compliant with his home medication and diuretics.  Pt p/w low BP i/s/o recent viral infection and started on Metolazone daily dosing 3 days PTA by PCP. Suspect volume depletion 2/ addition of Metolazone especially given MANJIT on CKD. Hold sHF GDMT including diuretics. S/p albumin administration. 3/21 restart home dose Bumex s/p RHC. Pt with elevated troponin possibly i/s/o decreased clearance given MANJIT on CKD. Recent NST with predominant infarct, no role for cardiac cath. C/w ASA, Rosuvastatin. S/p CardioMems 3/21. RHC 3/21 - Ra 12, RV 50/11, PCWP 21, PA 51/27/37. Restarted home dose Bumex 2mg PO BID given mildly elevated PCWP. Held Jardiance, aldactone and Entresto given hypotension and MANJIT. MANJIT improved. Jardiance to be continue at home. No metalazone.     Medically cleared for discharge home with home care per Dr. Sow.    Weight - Discharge/Trend:  Weight in k.6 (25 @ 07:59)    Documented EF: 20%    Guideline Directed Medical Therapy Prescribed/Reason why not prescribed:  B-Blocker: Toprol XL 25 mg qd  ARNI/ACE-I/ARB: on hold for hypotension  MRA: on hold for hypotension  SGLT2 inhibitor: on hold for hypotension    Medication changes  No Metalazone.     Appointment scheduled within 7 days? per patient access services  [X] YES [ ] NO    Active or Pending Issues Requiring Follow-up: f/u cards, nephrology, PCP    Advanced Directives:   [X] Full code  [ ] DNR  [ ] Hospice    Discharge Diagnoses:  Chronic systolic heart failure with renal involvement Hospital Course:    64-year-old male patient past medical history of Heart Failure with reduced EF% 20 (most recent echo 2024), essential HTN, CKD3, type 2 DM on high doses of bedtime and on fluctuating preprandial insulin (past episode of hypoglycemia), CAD with past PCI on ASA and Plavix, LEFT BKA in [facility] in May 2024, last discharge from [place] 2024 who was brought in via EMS for hypotension.  Patient was evaluated at his PCPs office when he was found systolic in the 80s.  The past 2 days the patient has been experiencing lethargy and fatigue. Denies any fevers, chest pain, shortness of breath, right leg edema.  Does admit to dry cough.  On exam, found mentating AAOx3 with BP 90s to 100 systolic.  Endorses normal appetite.  Prior to today patient has been compliant with his home medication and diuretics.  Pt p/w low BP i/s/o recent viral infection and started on Metolazone daily dosing 3 days PTA by PCP. Suspect volume depletion 2/ addition of Metolazone especially given MANJIT on CKD. Hold sHF GDMT including diuretics. S/p albumin administration. 3/21 restart home dose Bumex s/p RHC. Pt with elevated troponin possibly i/s/o decreased clearance given MANJIT on CKD. Recent NST with predominant infarct, no role for cardiac cath. C/w ASA, Rosuvastatin. S/p CardioMems 3/21. RHC 3/21 - Ra 12, RV 50/11, PCWP 21, PA 51/27/37. Restarted home dose Bumex 2mg PO BID given mildly elevated PCWP. Held Jardiance, aldactone and Entresto given hypotension and MANJIT. MANJIT improved. Jardiance to be continue at home. No metalazone.     Medically cleared for discharge home with home care per Dr. Sow.    Weight - Discharge/Trend:  Weight in k.6 (25 @ 07:59)    Documented EF: 20%    Guideline Directed Medical Therapy Prescribed/Reason why not prescribed:  B-Blocker: Toprol XL 25 mg qd  ARNI/ACE-I/ARB: on hold for hypotension  MRA: on hold for hypotension  SGLT2 inhibitor: on hold for hypotension    Medication changes  No Metalazone.   Decreased bumex    Appointment scheduled within 7 days? per patient access services  [X] YES [ ] NO    Active or Pending Issues Requiring Follow-up: f/u cards, nephrology, PCP    Advanced Directives:   [X] Full code  [ ] DNR  [ ] Hospice    Discharge Diagnoses:  Chronic systolic heart failure with renal involvement

## 2025-03-21 NOTE — PROGRESS NOTE ADULT - PROBLEM SELECTOR PLAN 1
likely due to overdiuresis - recently started on metolazone which he took for ~8 days in addition to bumex 4mg bid.   s/p 500cc LR in ED   Hold  bumex and metolazone for now   cough suspect due to viral infection   monitor vitals [id number] for now  Cards Renal following   Hold Diuretics

## 2025-03-21 NOTE — DISCHARGE NOTE PROVIDER - CARE PROVIDER_API CALL
Garrison Boykin  Internal Medicine  [address], [address]  [place] [address]  Phone: [phone]  Fax: [phone]  Follow Up Time: 1 week    Shawn Barnes  Cardiovascular Disease  [address], [address]  [place] [address]  Phone: [phone]  Fax: [phone]  Follow Up Time: 1 week    Yann Cain  Nephrology  [address], [address]  [place] [address]  Phone: [phone]  Fax: [phone]  Follow Up Time:

## 2025-03-21 NOTE — DISCHARGE NOTE PROVIDER - NSDCCPCAREPLAN_GEN_ALL_CORE_FT
PRINCIPAL DISCHARGE DIAGNOSIS  Diagnosis: Acute on chronic systolic congestive heart failure  Assessment and Plan of Treatment: Continue Bumex  You had a right heart cath and cardiomems placement  Please follow up with cardiology and primary care doctor within 1 week.  Weigh yourself daily.  If you gain 3lbs in 3 days, or 5lbs in a week call your Health Care Provider.  Do not eat or drink foods containing more than 2000mg of salt (sodium) in your diet every day.  Call your Health Care Provider if you have any swelling or increased swelling in your feet, ankles, and/or stomach.  Take all of your medication as directed.  If you become dizzy call your Health Care Provider.      SECONDARY DISCHARGE DIAGNOSES  Diagnosis: Bronchitis, chronic  Assessment and Plan of Treatment: Follow up with primary care doctor    Diagnosis: BPH (benign prostatic hyperplasia)  Assessment and Plan of Treatment: You have an enlarged prostate gland which gets bigger as men get older - it is a very common problem and has nothing to do with prostate cancer  Call your doctor if you are urinating more frequently, have trouble starting to urinate, have weak stream, urine leaking or dribbling, and feeling as though bladder is not empty after urination  Your doctor will monitor your prostate with a rectal exam as well as urine or blood testing  You can help yourself by reducing the amount of fluid you drink before going to bed, limiting the amount of alcohol & caffeine you drink   Avoid cold & allergy medication that contain decongestants or antihistamines which make BPH symptoms worse  You can also "double void" by waiting a moment after urinating & trying again  Take your medication as prescribed - one medication helps to relax the muscle around the urethra and the other medication you may take prevents the prostate from growing more or even shrinking the prostate    Diagnosis: Hypotension  Assessment and Plan of Treatment: Your medications were adjusted due to hypotension.  Your blood pressure is improved.     PRINCIPAL DISCHARGE DIAGNOSIS  Diagnosis: Acute on chronic systolic congestive heart failure  Assessment and Plan of Treatment: Continue Bumex  You had a right heart cath and cardiomems placement  Please follow up with cardiology and primary care doctor within 1 week.  Weigh yourself daily.  If you gain 3lbs in 3 days, or 5lbs in a week call your Health Care Provider.  Do not eat or drink foods containing more than 2000mg of salt (sodium) in your diet every day.  Call your Health Care Provider if you have any swelling or increased swelling in your feet, ankles, and/or stomach.  Take all of your medication as directed.  If you become dizzy call your Health Care Provider.      SECONDARY DISCHARGE DIAGNOSES  Diagnosis: Bronchitis, chronic  Assessment and Plan of Treatment: Follow up with primary care doctor    Diagnosis: Hypotension  Assessment and Plan of Treatment: Your medications were adjusted due to hypotension.  Your blood pressure is improved.    Diagnosis: BPH (benign prostatic hyperplasia)  Assessment and Plan of Treatment: You have an enlarged prostate gland which gets bigger as men get older - it is a very common problem and has nothing to do with prostate cancer  Call your doctor if you are urinating more frequently, have trouble starting to urinate, have weak stream, urine leaking or dribbling, and feeling as though bladder is not empty after urination  Your doctor will monitor your prostate with a rectal exam as well as urine or blood testing  You can help yourself by reducing the amount of fluid you drink before going to bed, limiting the amount of alcohol & caffeine you drink   Avoid cold & allergy medication that contain decongestants or antihistamines which make BPH symptoms worse  You can also "double void" by waiting a moment after urinating & trying again  Take your medication as prescribed - one medication helps to relax the muscle around the urethra and the other medication you may take prevents the prostate from growing more or even shrinking the prostate    Diagnosis: Diabetes type 2  Assessment and Plan of Treatment: HgA1C this admission.  Make sure you get your HgA1c checked every three months.  If you take oral diabetes medications, check your blood glucose two times a day.  If you take insulin, check your blood glucose before meals and at bedtime.  It's important not to skip any meals.  Keep a log of your blood glucose results and always take it with you to your doctor appointments.  Keep a list of your current medications including injectables and over the counter medications and bring this medication list with you to all your doctor appointments.  If you have not seen your ophthalmologist this year call for appointment.  Check your feet daily for redness, sores, or openings. Do not self treat. If no improvement in two days call your primary care physician for an appointment.  Low blood sugar (hypoglycemia) is a blood sugar below 70mg/dl. Check your blood sugar if you feel signs/symptoms of hypoglycemia. If your blood sugar is below 70 take 15 grams of carbohydrates (ex 4 oz of apple juice, 3-4 glucose tablets, or 4-6 oz of regular soda) wait 15 minutes and repeat blood sugar to make sure it comes up above 70.  If your blood sugar is above 70 and you are due for a meal, have a meal.  If you are not due for a meal have a snack.  This snack helps keeps your blood sugar at a safe range.      Diagnosis: MANJIT (acute kidney injury)  Assessment and Plan of Treatment: Avoid taking (NSAIDs) - (ex: Ibuprofen, Advil, Celebrex, Naprosyn)  Avoid taking any nephrotoxic agents (can harm kidneys) - Intravenous contrast for diagnostic testing, combination cold medications.  Have all medications adjusted for your renal function by your Health Care Provider.  Blood pressure control is important.  Take all medication as prescribed.      Diagnosis: Diarrhea  Assessment and Plan of Treatment: If persistent diarrhea, abdominal pain seek medical atttention  Call you Health care provider upon arrival home to make a one week follow up appointment.  If you develop fever, chills, malaise, or change in mental status call your Health Care Provider or go to the Emergency Department.  .       PRINCIPAL DISCHARGE DIAGNOSIS  Diagnosis: Acute on chronic systolic congestive heart failure  Assessment and Plan of Treatment: Continue Bumex  You had a right heart cath and cardiomems placement  Please follow up with cardiology and primary care doctor within 1 week.  Weigh yourself daily.  If you gain 3lbs in 3 days, or 5lbs in a week call your Health Care Provider.  Do not eat or drink foods containing more than 2000mg of salt (sodium) in your diet every day.  Call your Health Care Provider if you have any swelling or increased swelling in your feet, ankles, and/or stomach.  Take all of your medication as directed.  If you become dizzy call your Health Care Provider.  No  metalazone.   Continue home Jardiance. Follow up with  next week      SECONDARY DISCHARGE DIAGNOSES  Diagnosis: Bronchitis, chronic  Assessment and Plan of Treatment: Follow up with primary care doctor    Diagnosis: Hypotension  Assessment and Plan of Treatment: Your medications were adjusted due to hypotension.  Your blood pressure is improved.    Diagnosis: BPH (benign prostatic hyperplasia)  Assessment and Plan of Treatment: You have an enlarged prostate gland which gets bigger as men get older - it is a very common problem and has nothing to do with prostate cancer  Call your doctor if you are urinating more frequently, have trouble starting to urinate, have weak stream, urine leaking or dribbling, and feeling as though bladder is not empty after urination  Your doctor will monitor your prostate with a rectal exam as well as urine or blood testing  You can help yourself by reducing the amount of fluid you drink before going to bed, limiting the amount of alcohol & caffeine you drink   Avoid cold & allergy medication that contain decongestants or antihistamines which make BPH symptoms worse  You can also "double void" by waiting a moment after urinating & trying again  Take your medication as prescribed - one medication helps to relax the muscle around the urethra and the other medication you may take prevents the prostate from growing more or even shrinking the prostate    Diagnosis: Diabetes type 2  Assessment and Plan of Treatment: HgA1C this admission 10.4  Make sure you get your HgA1c checked every three months.  If you take oral diabetes medications, check your blood glucose two times a day.  If you take insulin, check your blood glucose before meals and at bedtime.  It's important not to skip any meals.  Keep a log of your blood glucose results and always take it with you to your doctor appointments.  Keep a list of your current medications including injectables and over the counter medications and bring this medication list with you to all your doctor appointments.  If you have not seen your ophthalmologist this year call for appointment.  Check your feet daily for redness, sores, or openings. Do not self treat. If no improvement in two days call your primary care physician for an appointment.  Low blood sugar (hypoglycemia) is a blood sugar below 70mg/dl. Check your blood sugar if you feel signs/symptoms of hypoglycemia. If your blood sugar is below 70 take 15 grams of carbohydrates (ex 4 oz of apple juice, 3-4 glucose tablets, or 4-6 oz of regular soda) wait 15 minutes and repeat blood sugar to make sure it comes up above 70.  If your blood sugar is above 70 and you are due for a meal, have a meal.  If you are not due for a meal have a snack.  This snack helps keeps your blood sugar at a safe range.  Follow up with Dr. Boykin to review A1c as elevated    Diagnosis: MANJIT (acute kidney injury)  Assessment and Plan of Treatment: Avoid taking (NSAIDs) - (ex: Ibuprofen, Advil, Celebrex, Naprosyn)  Avoid taking any nephrotoxic agents (can harm kidneys) - Intravenous contrast for diagnostic testing, combination cold medications.  Have all medications adjusted for your renal function by your Health Care Provider.  Blood pressure control is important.  Take all medication as prescribed.      Diagnosis: Diarrhea  Assessment and Plan of Treatment: If persistent diarrhea, abdominal pain seek medical atttention  Call you Health care provider upon arrival home to make a one week follow up appointment.  If you develop fever, chills, malaise, or change in mental status call your Health Care Provider or go to the Emergency Department.  .       PRINCIPAL DISCHARGE DIAGNOSIS  Diagnosis: Acute on chronic systolic congestive heart failure  Assessment and Plan of Treatment: Continue Bumex. No metalazone   You had a right heart cath and cardiomems placement on 3/21/25  Please follow up with cardiology and primary care doctor within 1 week.  Weigh yourself daily.  If you gain 3lbs in 3 days, or 5lbs in a week call your Health Care Provider.  Do not eat or drink foods containing more than 2000mg of salt (sodium) in your diet every day.  Call your Health Care Provider if you have any swelling or increased swelling in your feet, ankles, and/or stomach.  Take all of your medication as directed.  If you become dizzy call your Health Care Provider.  Continue home Jardiance. Follow up with  next week      SECONDARY DISCHARGE DIAGNOSES  Diagnosis: Bronchitis, chronic  Assessment and Plan of Treatment: Follow up with primary care doctor    Diagnosis: Hypotension  Assessment and Plan of Treatment: Your medications were adjusted due to hypotension.  Your blood pressure is improved.    Diagnosis: BPH (benign prostatic hyperplasia)  Assessment and Plan of Treatment: You have an enlarged prostate gland which gets bigger as men get older - it is a very common problem and has nothing to do with prostate cancer  Call your doctor if you are urinating more frequently, have trouble starting to urinate, have weak stream, urine leaking or dribbling, and feeling as though bladder is not empty after urination  Your doctor will monitor your prostate with a rectal exam as well as urine or blood testing  You can help yourself by reducing the amount of fluid you drink before going to bed, limiting the amount of alcohol & caffeine you drink   Avoid cold & allergy medication that contain decongestants or antihistamines which make BPH symptoms worse  You can also "double void" by waiting a moment after urinating & trying again  Take your medication as prescribed - one medication helps to relax the muscle around the urethra and the other medication you may take prevents the prostate from growing more or even shrinking the prostate    Diagnosis: Diabetes type 2  Assessment and Plan of Treatment: HgA1C this admission 10.4  Make sure you get your HgA1c checked every three months.  If you take oral diabetes medications, check your blood glucose two times a day.  If you take insulin, check your blood glucose before meals and at bedtime.  It's important not to skip any meals.  Keep a log of your blood glucose results and always take it with you to your doctor appointments.  Keep a list of your current medications including injectables and over the counter medications and bring this medication list with you to all your doctor appointments.  If you have not seen your ophthalmologist this year call for appointment.  Check your feet daily for redness, sores, or openings. Do not self treat. If no improvement in two days call your primary care physician for an appointment.  Low blood sugar (hypoglycemia) is a blood sugar below 70mg/dl. Check your blood sugar if you feel signs/symptoms of hypoglycemia. If your blood sugar is below 70 take 15 grams of carbohydrates (ex 4 oz of apple juice, 3-4 glucose tablets, or 4-6 oz of regular soda) wait 15 minutes and repeat blood sugar to make sure it comes up above 70.  If your blood sugar is above 70 and you are due for a meal, have a meal.  If you are not due for a meal have a snack.  This snack helps keeps your blood sugar at a safe range.  Follow up with Dr. Boykin to review A1c as elevated    Diagnosis: MANJIT (acute kidney injury)  Assessment and Plan of Treatment: Avoid taking (NSAIDs) - (ex: Ibuprofen, Advil, Celebrex, Naprosyn)  Avoid taking any nephrotoxic agents (can harm kidneys) - Intravenous contrast for diagnostic testing, combination cold medications.  Have all medications adjusted for your renal function by your Health Care Provider.  Blood pressure control is important.  Take all medication as prescribed.      Diagnosis: Diarrhea  Assessment and Plan of Treatment: If persistent diarrhea, abdominal pain seek medical atttention  Call you Health care provider upon arrival home to make a one week follow up appointment.  If you develop fever, chills, malaise, or change in mental status call your Health Care Provider or go to the Emergency Department.  .       PRINCIPAL DISCHARGE DIAGNOSIS  Diagnosis: Acute on chronic systolic congestive heart failure  Assessment and Plan of Treatment: Continue Bumex 2mg twice a day. No metalazone   You had a right heart cath and cardiomems placement on 3/21/25  Please follow up with cardiology and primary care doctor within 1 week.  Weigh yourself daily.  If you gain 3lbs in 3 days, or 5lbs in a week call your Health Care Provider.  Do not eat or drink foods containing more than 2000mg of salt (sodium) in your diet every day.  Call your Health Care Provider if you have any swelling or increased swelling in your feet, ankles, and/or stomach.  Take all of your medication as directed.  If you become dizzy call your Health Care Provider.  Continue home Jardiance. Follow up with  next week      SECONDARY DISCHARGE DIAGNOSES  Diagnosis: Bronchitis, chronic  Assessment and Plan of Treatment: Follow up with primary care doctor    Diagnosis: Hypotension  Assessment and Plan of Treatment: Your medications were adjusted due to hypotension.  Your blood pressure is improved.    Diagnosis: BPH (benign prostatic hyperplasia)  Assessment and Plan of Treatment: You have an enlarged prostate gland which gets bigger as men get older - it is a very common problem and has nothing to do with prostate cancer  Call your doctor if you are urinating more frequently, have trouble starting to urinate, have weak stream, urine leaking or dribbling, and feeling as though bladder is not empty after urination  Your doctor will monitor your prostate with a rectal exam as well as urine or blood testing  You can help yourself by reducing the amount of fluid you drink before going to bed, limiting the amount of alcohol & caffeine you drink   Avoid cold & allergy medication that contain decongestants or antihistamines which make BPH symptoms worse  You can also "double void" by waiting a moment after urinating & trying again  Take your medication as prescribed - one medication helps to relax the muscle around the urethra and the other medication you may take prevents the prostate from growing more or even shrinking the prostate    Diagnosis: Diabetes type 2  Assessment and Plan of Treatment: HgA1C this admission 10.4  Make sure you get your HgA1c checked every three months.  If you take oral diabetes medications, check your blood glucose two times a day.  If you take insulin, check your blood glucose before meals and at bedtime.  It's important not to skip any meals.  Keep a log of your blood glucose results and always take it with you to your doctor appointments.  Keep a list of your current medications including injectables and over the counter medications and bring this medication list with you to all your doctor appointments.  If you have not seen your ophthalmologist this year call for appointment.  Check your feet daily for redness, sores, or openings. Do not self treat. If no improvement in two days call your primary care physician for an appointment.  Low blood sugar (hypoglycemia) is a blood sugar below 70mg/dl. Check your blood sugar if you feel signs/symptoms of hypoglycemia. If your blood sugar is below 70 take 15 grams of carbohydrates (ex 4 oz of apple juice, 3-4 glucose tablets, or 4-6 oz of regular soda) wait 15 minutes and repeat blood sugar to make sure it comes up above 70.  If your blood sugar is above 70 and you are due for a meal, have a meal.  If you are not due for a meal have a snack.  This snack helps keeps your blood sugar at a safe range.  Follow up with Dr. Boykin to review A1c as elevated    Diagnosis: MANJIT (acute kidney injury)  Assessment and Plan of Treatment: Avoid taking (NSAIDs) - (ex: Ibuprofen, Advil, Celebrex, Naprosyn)  Avoid taking any nephrotoxic agents (can harm kidneys) - Intravenous contrast for diagnostic testing, combination cold medications.  Have all medications adjusted for your renal function by your Health Care Provider.  Blood pressure control is important.  Take all medication as prescribed.      Diagnosis: Diarrhea  Assessment and Plan of Treatment: If persistent diarrhea, abdominal pain seek medical atttention  Call you Health care provider upon arrival home to make a one week follow up appointment.  If you develop fever, chills, malaise, or change in mental status call your Health Care Provider or go to the Emergency Department.  .

## 2025-03-21 NOTE — DISCHARGE NOTE NURSING/CASE MANAGEMENT/SOCIAL WORK - NSDCVIVACCINE_GEN_ALL_CORE_FT
influenza, injectable, quadrivalent, preservative free; 08-Feb-2018 11:10; Chely Patterson (RN); [employer]; [id number]; IntraMuscular; Deltoid Left.; 0.5 milliLiter(s); VIS (VIS Published: 07-Aug-2015, VIS Presented: 08-Feb-2018);   influenza, injectable, quadrivalent, preservative free; 21-Sep-2020 18:51; Yan Cazares (RN); [employer]; [id number] (Exp. Date: 30-Jun-2021); IntraMuscular; Deltoid Right.; 0.5 milliLiter(s); VIS (VIS Published: 15-Aug-2019, VIS Presented: 21-Sep-2020);

## 2025-03-21 NOTE — DISCHARGE NOTE NURSING/CASE MANAGEMENT/SOCIAL WORK - FINANCIAL ASSISTANCE
[facility] provides services at a reduced cost to those who are determined to be eligible through [facility]’s financial assistance program. Information regarding [facility]’s financial assistance program can be found by going to [email or web address] or by calling [phone].

## 2025-03-21 NOTE — DISCHARGE NOTE PROVIDER - NSDCFUADDAPPT_GEN_ALL_CORE_FT
APPTS ARE READY TO BE MADE: [X] YES    Best Family or Patient Contact (if needed):    Additional Information about above appointments (if needed):    1:   2:   3:     Other comments or requests:    APPTS ARE READY TO BE MADE: [X] YES    Best Family or Patient Contact (if needed):    Additional Information about above appointments (if needed):    1: Cardiology   2:   3:     Other comments or requests:    APPTS ARE READY TO BE MADE: [X] YES    Best Family or Patient Contact (if needed):    Additional Information about above appointments (if needed):    1: Cardiology   2: PMD  3:     Other comments or requests:    APPTS ARE READY TO BE MADE: [X] YES    Best Family or Patient Contact (if needed):    Additional Information about above appointments (if needed):    1: Cardiology   2: PMD  3:     Other comments or requests:       Provided patient with provider referral information, however patient prefers to schedule the appointments on their own.

## 2025-03-21 NOTE — DISCHARGE NOTE PROVIDER - PROVIDER TOKENS
PROVIDER:[TOKEN:[[id number]],FOLLOWUP:[1 week]],PROVIDER:[TOKEN:[[id number]],FOLLOWUP:[1 week]],PROVIDER:[TOKEN:[[id number]]]

## 2025-03-21 NOTE — PROGRESS NOTE ADULT - ASSESSMENT
64M h/o CHF with severely reduced LV function s/p AICD, CAD s/p PCI, DM2, L BKA, CKD3, COPD, GERD sent in to ED by PCP office for hypotension. the patient states he was in heart failure a few days ago and was started on metolazone 3 days ago by his pcp (metolazone filled 11 days ago). Wife states he was taking metolazone daily for about a week and lost about 40 lbs in that time. he has been taking all of his medications as prescribed. He developed a cough with shortness of breath about a week ago and was given a zpak which he finished. +sick contact- his grandchild has a viral infection. Overall, he feels slightly better but coughing has not improved. cough productive of greenish sputum at times though that has decreased. He denies any fever or chills. he has been taking his prn albuterol about 3x/day recently. currently, he denies any chest pain, sob, abdominal pain, change in appetite. he does c/o the persistent cough and feels he is unable to bring up sputum.   noticed with high creatinine      1- MANJIT   2- chf hx   3- hypotension on admission     manjit in setting of pre renal azotemia due to diuretics and decrease perfusion due to hypotension   creatinine worsening   bp is now baseline  received ivf and albumin  hold further diuretics until cr improve further and bun lower   he is not uremic  heme  for paraproteinemia   strict I/O

## 2025-03-21 NOTE — PROGRESS NOTE ADULT - SUBJECTIVE AND OBJECTIVE BOX
DATE OF SERVICE: 03-21-25 @ 14:53    Patient is a 64y old  Male who presents with a chief complaint of sent in from PCP office for hypotension (20 Mar 2025 22:10)      INTERVAL HISTORY: CardioMems placed today    REVIEW OF SYSTEMS:  CONSTITUTIONAL: No weakness  EYES/ENT: No visual changes;  No throat pain   NECK: No pain or stiffness  RESPIRATORY: No cough, wheezing; No shortness of breath  CARDIOVASCULAR: No chest pain or palpitations  GASTROINTESTINAL: No abdominal  pain. No nausea, vomiting, or hematemesis  GENITOURINARY: No dysuria, frequency or hematuria  NEUROLOGICAL: No stroke like symptoms  SKIN: No rashes    TELEMETRY Personally reviewed: SR 1st deg 80 -90 bpm  	  MEDICATIONS:  metoprolol succinate ER 25 milliGRAM(s) Oral daily        PHYSICAL EXAM:  T(C): 36.3 (03-21-25 @ 13:50), Max: 36.6 (03-21-25 @ 04:00)  HR: 89 (03-21-25 @ 13:50) (85 - 92)  BP: 119/66 (03-21-25 @ 13:50) (94/62 - 119/66)  RR: 18 (03-21-25 @ 13:50) (17 - 18)  SpO2: 98% (03-21-25 @ 13:50) (92% - 98%)  Wt(kg): --  I&O's Summary    20 Mar 2025 07:01  -  21 Mar 2025 07:00  --------------------------------------------------------  IN: 400 mL / OUT: 800 mL / NET: -400 mL    21 Mar 2025 07:01  -  21 Mar 2025 14:53  --------------------------------------------------------  IN: 480 mL / OUT: 1250 mL / NET: -770 mL      Height (cm): 195.6 (03-21 @ 13:50)  Weight (kg): 108.9 (03-21 @ 13:50)  BMI (kg/m2): 28.5 (03-21 @ 13:50)  BSA (m2): 2.42 (03-21 @ 13:50)    Appearance: In no distress	  HEENT:    PERRL, EOMI	  Cardiovascular:  S1 S2, No JVD  Respiratory: Lungs clear to auscultation	  Gastrointestinal:  Soft, Non-tender, + BS	  Vascularature:  No edema of LE  Psychiatric: Appropriate affect   Neuro: no acute focal deficits                               13.1   7.64  )-----------( 187      ( 21 Mar 2025 05:05 )             42.9     03-21    135  |  89[L]  |  111[H]  ----------------------------<  293[H]  4.0   |  27  |  2.94[H]    Ca    9.7      21 Mar 2025 05:05  Phos  3.9     03-21  Mg     2.6     03-21          Labs personally reviewed      ASSESSMENT/PLAN: 	      64-year-old male patient past medical history of Heart Failure with reduced EF% 20 (most recent echo Sept 2024), essential HTN, CKD3, type 2 DM on high doses of bedtime and on fluctuating preprandial insulin (past episode of hypoglycemia), CAD with past PCI on ASA and Plavix, LEFT BKA in [facility] 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  Patient was evaluated at his PCPs office when he was found systolic in the 80s.  The past 2 days the patient has been experiencing lethargy and fatigue. Denies any fevers, chest pain, shortness of breath, right leg edema.  Does admit to dry cough.  On exam, found mentating AAOx3 with BP 90s to 100 systolic.  Endorses normal appetite.  Prior to today patient has been compliant with his home medication and diuretics.  Hypertension possibly from over diuresis at home versus infectious etiology.      Problem/Plan - 1:  ·  Problem: Hypotension  ·  Plan:  p/w low BP i/s/o recent viral infection and started on Metolazone daily dosing 3 days PTA by PCP  - Suspect volume depletion 2/2 addition of Metolazone especially given MANJIT on CKD  - POCUS with predominant A lines--> obtain formal TTE  - Check BNP  - Hold sHF GDMT including diuretics  - s/p albumin administration    Problem/Plan - 2: Elevated Troponin   ·  Plan:  Patient denies CP or SOB  - Will review ECG  - Possibly i/s/o decreased clearance given MANJIT on CKD  - Recent NST as noted below with predominant infarct  - c/w ASA, Rosuvastatin    Problem/Plan - 3: Chronic systolic heart failure.   ·  Plan:    - TTE 9/2024 with known EF 20% unchanged from prior  - Hold below sHF GDMT given hypotension. Will likely have to modify diuretics for DC  --- Entresto 24/26mg BID   --- Aldactone 25mg as OP  --- Jardiance 10mg daily  --- Bumex 2mg PO BID  --- Will not retsart Metolazone  - As per wife, dry weight ~247lbs  - 3/19 restarted Toprol 25mg daily    - Plan for CardioMems 3/21    Problem/Plan - 4:  ·  Problem: CAD (coronary artery disease).   ·  Plan: c/w asa and statin  - Ordered Nuclear Stress test given CAD and WCT seen on tele  - NM stress test with large-sized, moderate to severe defect(s) in the mid to distal anterior, septal, apical and inferior walls that are predominantly fixed consistent with an infarction with minimal chantel-infarct ischemia.  - Given predominant infarct no role for cardiac cath    Problem/Plan - 5:  ·  Problem: MANJIT on CKD.   ·  Plan: Monitor BMP daily, dose meds per renal fx, avoid nephrotoxins.  - Hold Bumex, Jardiance, aldactone and Entresto given hypotension and MANJIT    Problem/Plan  6:  ·  Problem: ICD  ·  Plan:  s/p ICD extraction and re-implant, (9/3)          KRISHNA Beckett DO [facility]  Cardiovascular Medicine  [address], [address]  Available through call or text on [employer] TEAMs  Office: [phone]     DATE OF SERVICE: 03-21-25 @ 14:53    Patient is a 64y old  Male who presents with a chief complaint of sent in from PCP office for hypotension (20 Mar 2025 22:10)      INTERVAL HISTORY: RHC & CardioMems placed today    REVIEW OF SYSTEMS:  CONSTITUTIONAL: No weakness  EYES/ENT: No visual changes;  No throat pain   NECK: No pain or stiffness  RESPIRATORY: No cough, wheezing; No shortness of breath  CARDIOVASCULAR: No chest pain or palpitations  GASTROINTESTINAL: No abdominal  pain. No nausea, vomiting, or hematemesis  GENITOURINARY: No dysuria, frequency or hematuria  NEUROLOGICAL: No stroke like symptoms  SKIN: No rashes    TELEMETRY Personally reviewed: SR 1st deg 80 -90 bpm  	  MEDICATIONS:  metoprolol succinate ER 25 milliGRAM(s) Oral daily        PHYSICAL EXAM:  T(C): 36.3 (03-21-25 @ 13:50), Max: 36.6 (03-21-25 @ 04:00)  HR: 89 (03-21-25 @ 13:50) (85 - 92)  BP: 119/66 (03-21-25 @ 13:50) (94/62 - 119/66)  RR: 18 (03-21-25 @ 13:50) (17 - 18)  SpO2: 98% (03-21-25 @ 13:50) (92% - 98%)  Wt(kg): --  I&O's Summary    20 Mar 2025 07:01  -  21 Mar 2025 07:00  --------------------------------------------------------  IN: 400 mL / OUT: 800 mL / NET: -400 mL    21 Mar 2025 07:01  -  21 Mar 2025 14:53  --------------------------------------------------------  IN: 480 mL / OUT: 1250 mL / NET: -770 mL      Height (cm): 195.6 (03-21 @ 13:50)  Weight (kg): 108.9 (03-21 @ 13:50)  BMI (kg/m2): 28.5 (03-21 @ 13:50)  BSA (m2): 2.42 (03-21 @ 13:50)    Appearance: In no distress	  HEENT:    PERRL, EOMI	  Cardiovascular:  S1 S2, No JVD  Respiratory: Lungs clear to auscultation	  Gastrointestinal:  Soft, Non-tender, + BS	  Vascularature:  No edema of LE  Psychiatric: Appropriate affect   Neuro: no acute focal deficits                               13.1   7.64  )-----------( 187      ( 21 Mar 2025 05:05 )             42.9     03-21    135  |  89[L]  |  111[H]  ----------------------------<  293[H]  4.0   |  27  |  2.94[H]    Ca    9.7      21 Mar 2025 05:05  Phos  3.9     03-21  Mg     2.6     03-21          Labs personally reviewed      ASSESSMENT/PLAN: 	      64-year-old male patient past medical history of Heart Failure with reduced EF% 20 (most recent echo Sept 2024), essential HTN, CKD3, type 2 DM on high doses of bedtime and on fluctuating preprandial insulin (past episode of hypoglycemia), CAD with past PCI on ASA and Plavix, LEFT BKA in [facility] in May 2024, last discharge from [place] Sept 2024 who was brought in via EMS for hypotension.  Patient was evaluated at his PCPs office when he was found systolic in the 80s.  The past 2 days the patient has been experiencing lethargy and fatigue. Denies any fevers, chest pain, shortness of breath, right leg edema.  Does admit to dry cough.  On exam, found mentating AAOx3 with BP 90s to 100 systolic.  Endorses normal appetite.  Prior to today patient has been compliant with his home medication and diuretics.  Hypertension possibly from over diuresis at home versus infectious etiology.      Problem/Plan - 1:  ·  Problem: Hypotension  ·  Plan:  p/w low BP i/s/o recent viral infection and started on Metolazone daily dosing 3 days PTA by PCP  - Suspect volume depletion 2/2 addition of Metolazone especially given MANJIT on CKD  - Hold sHF GDMT including diuretics  - s/p albumin administration  - 3/21 restart home dose Bumex s/p RHC    Problem/Plan - 2: Elevated Troponin   ·  Plan:  Patient denies CP or SOB  - Will review ECG  - Possibly i/s/o decreased clearance given MANJIT on CKD  - Recent NST as noted below with predominant infarct  - c/w ASA, Rosuvastatin    Problem/Plan - 3: Chronic systolic heart failure.   ·  Plan:    - TTE 9/2024 with known EF 20% unchanged from prior  - Hold below sHF GDMT given hypotension. Will likely have to modify diuretics for DC  --- Entresto 24/26mg BID   --- Aldactone 25mg as OP  --- Jardiance 10mg daily  --- Bumex 2mg PO BID  --- Will not retsart Metolazone  - As per wife, dry weight ~247lbs  - 3/19 restarted Toprol 25mg daily    - Plan for CardioMems 3/21  - RHC 3/21 - Ra 12, RV 50/11, PCWP 21, PA 51/27/37  - Restart home dose Bumex 2mg PO BID given mildly elevated PCWP    Problem/Plan - 4:  ·  Problem: CAD (coronary artery disease).   ·  Plan: c/w asa and statin  - Ordered Nuclear Stress test given CAD and WCT seen on tele  - NM stress test with large-sized, moderate to severe defect(s) in the mid to distal anterior, septal, apical and inferior walls that are predominantly fixed consistent with an infarction with minimal chantel-infarct ischemia.  - Given predominant infarct no role for cardiac cath    Problem/Plan - 5:  ·  Problem: MANJIT on CKD.   ·  Plan: Monitor BMP daily, dose meds per renal fx, avoid nephrotoxins.  - Hold Bumex, Jardiance, aldactone and Entresto given hypotension and MANJIT    Problem/Plan  6:  ·  Problem: ICD  ·  Plan:  s/p ICD extraction and re-implant, (9/3)          KRISHNA Beckett DO [facility]  Cardiovascular Medicine  [address], [address]  Available through call or text on [employer] TEAM[employer]  Office: [phone]     DATE OF SERVICE: 03-21-25 @ 14:53    Patient is a 64y old  Male who presents with a chief complaint of sent in from PCP office for hypotension (20 Mar 2025 22:10)      INTERVAL HISTORY: RHC & CardioMems placed today    REVIEW OF SYSTEMS:  CONSTITUTIONAL: No weakness  EYES/ENT: No visual changes;  No throat pain   NECK: No pain or stiffness  RESPIRATORY: No cough, wheezing; No shortness of breath  CARDIOVASCULAR: No chest pain or palpitations  GASTROINTESTINAL: No abdominal  pain. No nausea, vomiting, or hematemesis  GENITOURINARY: No dysuria, frequency or hematuria  NEUROLOGICAL: No stroke like symptoms  SKIN: No rashes    TELEMETRY Personally reviewed: SR 1st deg 80 -90 bpm  	  MEDICATIONS:  metoprolol succinate ER 25 milliGRAM(s) Oral daily        PHYSICAL EXAM:  T(C): 36.3 (03-21-25 @ 13:50), Max: 36.6 (03-21-25 @ 04:00)  HR: 89 (03-21-25 @ 13:50) (85 - 92)  BP: 119/66 (03-21-25 @ 13:50) (94/62 - 119/66)  RR: 18 (03-21-25 @ 13:50) (17 - 18)  SpO2: 98% (03-21-25 @ 13:50) (92% - 98%)  Wt(kg): --  I&O's Summary    20 Mar 2025 07:01  -  21 Mar 2025 07:00  --------------------------------------------------------  IN: 400 mL / OUT: 800 mL / NET: -400 mL    21 Mar 2025 07:01  -  21 Mar 2025 14:53  --------------------------------------------------------  IN: 480 mL / OUT: 1250 mL / NET: -770 mL      Height (cm): 195.6 (03-21 @ 13:50)  Weight (kg): 108.9 (03-21 @ 13:50)  BMI (kg/m2): 28.5 (03-21 @ 13:50)  BSA (m2): 2.42 (03-21 @ 13:50)    Appearance: In no distress	  HEENT:    PERRL, EOMI	  Cardiovascular:  S1 S2, No JVD  Respiratory: Lungs clear to auscultation	  Gastrointestinal:  Soft, Non-tender, + BS	  Vascularature:  No edema of LE  Psychiatric: Appropriate affect   Neuro: no acute focal deficits                               13.1   7.64  )-----------( 187      ( 21 Mar 2025 05:05 )             42.9     03-21    135  |  89[L]  |  111[H]  ----------------------------<  293[H]  4.0   |  27  |  2.94[H]    Ca    9.7      21 Mar 2025 05:05  Phos  3.9     03-21  Mg     2.6     03-21          Labs personally reviewed      ASSESSMENT/PLAN: 	    64-year-old male patient past medical history of Heart Failure with reduced EF% 20 (most recent echo Sept 2024), essential HTN, CKD3, type 2 DM on high doses of bedtime and on fluctuating preprandial insulin (past episode of hypoglycemia), CAD with past PCI on ASA and Plavix, LEFT BKA in [facility] in May 2024, last discharge from [place] Sept 2024 who was brought in via EMS for hypotension.  Patient was evaluated at his PCPs office when he was found systolic in the 80s.  The past 2 days the patient has been experiencing lethargy and fatigue. Denies any fevers, chest pain, shortness of breath, right leg edema.  Does admit to dry cough.  On exam, found mentating AAOx3 with BP 90s to 100 systolic.  Endorses normal appetite.  Prior to today patient has been compliant with his home medication and diuretics.  Hypertension possibly from over diuresis at home versus infectious etiology.      Problem/Plan - 1:  ·  Problem: Hypotension  ·  Plan:  p/w low BP i/s/o recent viral infection and started on Metolazone daily dosing 3 days PTA by PCP  - Suspect volume depletion 2/2 addition of Metolazone especially given MANJIT on CKD  - Hold sHF GDMT including diuretics  - s/p albumin administration  - 3/21 restart home dose Bumex s/p RHC    Problem/Plan - 2: Elevated Troponin   ·  Plan:  Patient denies CP or SOB  - Will review ECG  - Possibly i/s/o decreased clearance given MANJIT on CKD  - Recent NST as noted below with predominant infarct  - c/w ASA, Rosuvastatin    Problem/Plan - 3: Chronic systolic heart failure.   ·  Plan:    - TTE 9/2024 with known EF 20% unchanged from prior  - Restart sHF GDMT as noted below  --- Entresto 24/26mg BID   --- Aldactone 25mg daily  --- Jardiance 10mg daily  --- Bumex 2mg PO BID  --- Will not retsart Metolazone  - As per wife, dry weight ~247lbs  - 3/19 restarted Toprol 25mg daily    - Plan for CardioMems 3/21  - RHC 3/21 - Ra 12, RV 50/11, PCWP 21, PA 51/27/37    Problem/Plan - 4:  ·  Problem: CAD (coronary artery disease).   ·  Plan: c/w asa and statin  - Ordered Nuclear Stress test given CAD and WCT seen on tele  - NM stress test with large-sized, moderate to severe defect(s) in the mid to distal anterior, septal, apical and inferior walls that are predominantly fixed consistent with an infarction with minimal chantel-infarct ischemia.  - Given predominant infarct no role for cardiac cath    Problem/Plan - 5:  ·  Problem: MANJIT on CKD.   ·  Plan: Monitor BMP daily, dose meds per renal fx, avoid nephrotoxins.  - Resolved    Problem/Plan  6:  ·  Problem: ICD  ·  Plan:  s/p ICD extraction and re-implant, (9/3)          KRISHNA Beckett DO [facility]  Cardiovascular Medicine  [address], [address]  Available through call or text on [employer] TE[employer]  Office: [phone]

## 2025-03-21 NOTE — DISCHARGE NOTE NURSING/CASE MANAGEMENT/SOCIAL WORK - PATIENT PORTAL LINK FT
You can access the FollowMyHealth Patient Portal offered by [facility] by registering at the following website: [email or web address]. By joining [facility]’s FollowMyHealth portal, you will also be able to view your health information using other applications (apps) compatible with our system.

## 2025-03-21 NOTE — PROGRESS NOTE ADULT - SUBJECTIVE AND OBJECTIVE BOX
[facility]   [phone]  RENAL FOLLOW UP NOTE  --------------------------------------------------------------------------------  Chief Complaint:    24 hour events/subjective:    seen earlier   denies sob   states is breathing well    PAST HISTORY  --------------------------------------------------------------------------------  No significant changes to PMH, PSH, FHx, SHx, unless otherwise noted    ALLERGIES & MEDICATIONS  --------------------------------------------------------------------------------  Allergies    doxepin (Other)  Zosyn (Pruritus)  ceftriaxone (Rash)  vancomycin (Rash (Mild to Mod))  penicillins (Short breath; Rash)    Intolerances      Standing Inpatient Medications  albuterol/ipratropium for Nebulization 3 milliLiter(s) Nebulizer every 8 hours  aspirin enteric coated 81 milliGRAM(s) Oral daily  buMETAnide 2 milliGRAM(s) Oral two times a day  clopidogrel Tablet 75 milliGRAM(s) Oral daily  dextrose 5%. 1000 milliLiter(s) IV Continuous <Continuous>  dextrose 50% Injectable 25 Gram(s) IV Push once  dextrose 50% Injectable 25 Gram(s) IV Push once  fluticasone propionate/ salmeterol 250-50 MICROgram(s) Diskus 1 Dose(s) Inhalation two times a day  glucagon  Injectable 1 milliGRAM(s) IntraMuscular once  insulin glargine Injectable (LANTUS) 20 Unit(s) SubCutaneous at bedtime  insulin lispro (ADMELOG) corrective regimen sliding scale   SubCutaneous at bedtime  insulin lispro (ADMELOG) corrective regimen sliding scale   SubCutaneous three times a day before meals  insulin lispro Injectable (ADMELOG) 8 Unit(s) SubCutaneous before breakfast  insulin lispro Injectable (ADMELOG) 8 Unit(s) SubCutaneous before lunch  insulin lispro Injectable (ADMELOG) 8 Unit(s) SubCutaneous before dinner  metoprolol succinate ER 25 milliGRAM(s) Oral daily  pantoprazole    Tablet 40 milliGRAM(s) Oral before breakfast  rosuvastatin 10 milliGRAM(s) Oral at bedtime  tamsulosin 0.4 milliGRAM(s) Oral at bedtime    PRN Inpatient Medications  acetaminophen     Tablet .. 650 milliGRAM(s) Oral every 6 hours PRN  dextrose Oral Gel 15 Gram(s) Oral once PRN  hydrocodone/homatropine Syrup 5 milliLiter(s) Oral every 6 hours PRN  melatonin 3 milliGRAM(s) Oral at bedtime PRN      REVIEW OF SYSTEMS  --------------------------------------------------------------------------------    Gen: denies  fevers/chills, or HA or dizziness  CVS: denies chest pain/palpitations  Resp: denies SOB/Cough  GI: Denies N/V/Abd pain  : Denies dysuria/oliguria/hematuria        VITALS/PHYSICAL EXAM  --------------------------------------------------------------------------------  T(C): 36.6 (03-21-25 @ 20:00), Max: 36.6 (03-21-25 @ 04:00)  HR: 105 (03-21-25 @ 20:00) (89 - 105)  BP: 96/58 (03-21-25 @ 20:00) (96/58 - 119/66)  RR: 18 (03-21-25 @ 20:00) (15 - 18)  SpO2: 95% (03-21-25 @ 20:00) (92% - 99%)  Wt(kg): --  Height (cm): 195.6 (03-21-25 @ 13:50)  Weight (kg): 108.9 (03-21-25 @ 13:50)  BMI (kg/m2): 28.5 (03-21-25 @ 13:50)  BSA (m2): 2.42 (03-21-25 @ 13:50)      03-20-25 @ 07:01  -  03-21-25 @ 07:00  --------------------------------------------------------  IN: 400 mL / OUT: 800 mL / NET: -400 mL    03-21-25 @ 07:01  -  03-21-25 @ 22:45  --------------------------------------------------------  IN: 480 mL / OUT: 1250 mL / NET: -770 mL      Physical Exam:  	    	Gen: Non toxic comfortable appearing   	Pulm: decrease bs  no rales or ronchi or wheezing  	CV: No JVD. RRR, S1S2; no rub  	Abd: +BS, soft, nontender/nondistended  	: No suprapubic tenderness  	UE: Warm, no cyanosis  no clubbing,  no edema; no asterixis  	LE: Warm, no cyanosis  no clubbing, no edema L BKA   	Neuro: alert and oriented. speech coherent       LABS/STUDIES  --------------------------------------------------------------------------------              13.1   7.64  >-----------<  187      [03-21-25 @ 05:05]              42.9     135  |  89  |  111  ----------------------------<  293      [03-21-25 @ 05:05]  4.0   |  27  |  2.94        Ca     9.7     [03-21-25 @ 05:05]      Mg     2.6     [03-21-25 @ 05:05]      Phos  3.9     [03-21-25 @ 05:05]            Creatinine Trend:  SCr 2.94 [03-21 @ 05:05]  SCr 3.23 [03-20 @ 17:30]  SCr 3.76 [03-20 @ 04:46]  SCr 2.93 [03-19 @ 15:03]  SCr 3.18 [03-19 @ 12:11]      Urine Creatinine 85      [03-20-25 @ 07:19]  Urine Protein 13      [03-20-25 @ 07:19]  Urine Sodium 64      [03-20-25 @ 07:19]  Urine Urea Nitrogen 463      [03-20-25 @ 07:19]    Iron 24, TIBC 395, %sat 6      [08-29-24 @ 06:04]  Ferritin 51      [08-29-24 @ 07:31]  Lipid: chol 142, , HDL 27, LDL --      [03-20-25 @ 04:46]

## 2025-03-21 NOTE — DISCHARGE NOTE PROVIDER - CARE PROVIDERS DIRECT ADDRESSES
[email or web address]

## 2025-03-21 NOTE — DISCHARGE NOTE PROVIDER - NSFOLLOWUPCLINICS_GEN_ALL_ED_FT
[facility] - HF  Cardiology  [address] [facility], [address] [place] [address]   Phone:   Fax:   Follow Up Time: 1-3 days

## 2025-03-21 NOTE — DISCHARGE NOTE PROVIDER - NSDCDCMDCOMP_GEN_ALL_CORE
[address] [facility]  [address]  CHRISTIE ZARAGOZAMAGDIELANNA LUCIAJOSELIN NEW.SHAHANA, [address] [facility]  [phone]      Visit Date:  3/9/2022  Weight Management Pre-Op Follow-up    HPI:    Medically Supervised follow-up- Month #1 of 4/6- desires RYGB    Rosaline Holstein is here today for continued supervised weight management of morbid obesity. Has gained over 200# since graduating from high school. Admits that his weight is affecting him physically. Wants to feel better and be more active. Reports that he is very sedentary at work. Weight today 395#. Down 5# since last appointment. BMI 51. He reports that he is working on the behavior changes discussed at his initial appointment. Admits that he has been working on portion sizes. Has been eating out less. Consistent with eating 3 meals daily. Rarely snacking. No night time eating. Admits to eating out of boredom. Drinks 3 liters of water daily. Drinks Powerade Zero, 1 cup of coffee. No pop. No juice. No energy drinks. No alcohol. Not a smoker. Long discussion on importance of lifestyle changes, making better decisions with nutrition and increasing dedicated activity to be successful lifelong with weight loss with or without bariatric surgery. Comorbid conditions include HTN, IDDM, GERD, ICD placement 2016 due to CHF, MIKE tx with CPAP, chronic left knee pain and low back pain. Bartolome Jose well controlled with Prilosec. Follows with CHF Clinic. Checking blood sugars 3 x daily. Currently taking Ozempic, metformin, Lantus 30 units QHS and Humalog sliding scale as needed. Following with diabetic clinic. On a fluid restriction per CHF clinic, unsure of amount- will check. Occasionally wearing leg compression. Initial labs completed and reviewed- several pending. A1C 7.0- must remain < 8 prior to surgery. Iron low at 50- to start 65mg Iron daily. Vit D 12- to start weekly Vit D x 12 weeks. Take with food. Awaiting LOMN. Completed support groups x 1.  EKG to be completed with cardiac clearance. Discussed pre-op EGD. Cardiac clearance needed prior to surgery- Dr. Angelo Stanton. Pulmonary clearance needed prior to surgery- Valery Oates PA-C. Psychological clearance with Dr. Kenneth Silva 4/7/22 and 5/3/22. If he does not lose enough weight with medical management he would like to proceed with RYGB. Physical Activity:  Walking at [facility]  Days per week:2  Time per session: 30 minutes    Current BMI: Body mass index is 51.14 kg/m². Current Weight:   Wt Readings from Last 3 Encounters:   03/09/22 (!) 395 lb 9.6 oz (179.4 kg)   02/09/22 (!) 398 lb (180.5 kg)   01/28/22 (!) 400 lb (181.4 kg)     Initial Body Weight:400    Past Medical History:  Past Medical History:   Diagnosis Date    [facility]congest[facility]) [facility] [address]     Diabetes mellitus ([facility]     GERD (gastroesophageal reflux disease)     Hypercholesteremia     S/P ICD (internal cardiac defibrillator) procedure: 1/15/2015: Medtronic Single Chamber 1/15/2015    1/15/2015: Medtronic Single Chamber.  Dr. Alethea Wan Sleep apnea 06/09/2017    Grecia Duron lifevest applied 8/22/14 8/29/2014    returned prior to ICD insertion 1/15       Past Surgical History:  Past Surgical History:   Procedure Laterality Date    [facility]AC [facility]    CARDIAC DEFIBRILLATOR PLACEMENT  2014    CARDIAC DEFIBRILLATOR PLACEMENT      CARDIAC DEFIBRILLATOR PLACEMENT      EYE SURGERY      Lasix     VASECTOMY  2004       Past Social History:  Social History     Socioeconomic History    Marital status:      Spouse name: Not on file    Number of children: 1    Years of education: Not on file    Highest education level: Not on file   Occupational History     Employer: [employer]   Tobacco Use    Smoking status: Never Smoker    Smokeless tobacco: Never Used   Vaping Use    Vaping Use: Never used   Substance and Sexual Activity    Alcohol use: Never     Alcohol/week: 18.0 standard drinks     Types: 18 Cans of beer per week    Drug use: No    Sexual activity: Yes     Partners: Female   Other Topics Concern    Not on file   Social History Narrative    Not on file     Social Determinants of Health     Financial Resource Strain: Low Risk     Difficulty of Paying Living Expenses: Not hard at all   Food Insecurity: No Food Insecurity    Worried About Running Out of Food in the Last Year: Never true    [address] in the Last Year: Never true   Transportation Needs:     Lack of Transportation (Medical): Not on file    Lack of Transportation (Non-Medical):  Not on file   Physical Activity:     Days of Exercise per Week: Not on file    Minutes of Exercise per Session: Not on file   Stress:     Feeling of Stress : Not on file   Social Connections:     Frequency of Communication with Friends and Family: Not on file    Frequency of Social Gatherings with Friends and Family: Not on file    Attends Spiritism Services: Not on file    Active Member of [address] [place] [address] or Organizations: Not on file    Attends Club or Organization Meetings: Not on file    Marital Status: Not on file   Intimate Partner Violence:     Fear of Current or Ex-Partner: Not on file    Emotionally Abused: Not on file    Physically Abused: Not on file    Sexually Abused: Not on file   Housing Stability:     Unable to Pay for Housing in the Last Year: Not on file    Number of [place] in the Last Year: Not on file    Unstable Housing in the Last Year: Not on file        Medications:   Current Outpatient Medications   Medication Sig Dispense Refill    Cholecalciferol (VITAMIN D3) 1.25 MG (69515 UT) CAPS Take 1 capsule by mouth once a week 12 capsule 0    blood glucose test strips (PRODIGY NO CODING BLOOD GLUC) strip USE TO CHECK BLOOD SUGAR FOUR TIMES A  strip 3    Prodigy Twist Top Lancets 28G MISC USE TO TEST FOUR TIMES A  each 3    irbesartan (AVAPRO) 75 MG tablet TAKE 1 TABLET BY MOUTH ONE TIME A DAY IN THE EVENING 90 tablet 3    carvedilol (COREG) 6.25 MG tablet TAKE 1 TABLET BY MOUTH 2 TIMES A DAY WITH MEALS 180 tablet 3    OZEMPIC, 1 MG/DOSE, 4 MG/3ML SOPN INJECT 1MG UNDER THE SKIN ONCE A WEEK 24 mL 3    insulin lispro, 1 Unit Dial, (HUMALOG KWIKPEN) 100 UNIT/ML SOPN INJECT 14 UNITS WITH MEALS PLUS SLIDING SCALE --151-200 3 UNITS, 201-250 5 UNITS, 251-300 8 UNITS, 301-350 10 UNITS, 351-400 12 UNITS, OVER 400 15 UNITS AND CONTACT PHYSICIAN 135 mL 3    carvedilol (COREG) 25 MG tablet TAKE 1 TABLET BY MOUTH 2 TIMES A DAY WITH MEALS 180 tablet 3    ASPIRIN ADULT LOW STRENGTH 81 MG EC tablet TAKE 1 TABLET BY MOUTH ONE TIME A DAY 90 tablet 1    gabapentin (NEURONTIN) 100 MG capsule TAKE 1 CAPSULE BY MOUTH 3 TIMES A  capsule 1    spironolactone (ALDACTONE) 25 MG tablet TAKE 1 TABLET BY MOUTH ONE TIME A DAY 90 tablet 3    furosemide (LASIX) 20 MG tablet TAKE ONE TABLET BY MOUTH EVERY EVENING 90 tablet 3    furosemide (LASIX) 40 MG tablet TAKE ONE TABLET BY MOUTH EVERY MORNING 90 tablet 3    metFORMIN (GLUCOPHAGE-XR) 500 MG extended release tablet Take 2 tablets by mouth 2 times daily 360 tablet 2    CPAP Machine MISC by Does not apply route Ramp pressure: 4.0 cm H2O  Treatment pressure: 15.0 cm H2O 1 each 0    rosuvastatin (CRESTOR) 10 MG tablet TAKE 1 TABLET BY MOUTH ONE TIME A DAY 90 tablet 2    insulin glargine (LANTUS SOLOSTAR) 100 UNIT/ML injection pen Inject 30 Units into the skin nightly 10 pen 3    Blood Glucose Monitoring Suppl (PRODIGY AUTOCODE BLOOD GLUCOSE) MAUREEN Use to check blood sugar 4 times daily 1 Device 0    Insulin Pen Needle (PEN NEEDLES 31GX5/16\") 31G X 8 MM MISC Use to inject insulins and Ozempic 400 each 3    omeprazole (PRILOSEC) 40 MG delayed release capsule Take 1 capsule by mouth daily 90 capsule 1    fluticasone (FLONASE) 50 MCG/ACT nasal spray 2 sprays by Nasal route daily 1 Bottle 2    loratadine (CLARITIN) 10 MG tablet Take 10 mg by mouth daily      Respiratory Therapy Supplies (ADULT MASK) MISC Please do mask desensitization and/or provide mask of patient's choice. 1 each 0    acetaminophen (TYLENOL) 325 MG tablet Take 650 mg by mouth every 8 hours as needed for Pain      nitroGLYCERIN (NITROSTAT) 0.4 MG SL tablet Place 1 tablet under the tongue every 5 minutes as needed for Chest pain up to max of 3 total doses. If no relief after 1 dose, call 911. 25 tablet 1     No current facility-administered medications for this visit. Allergies: Allergies   Allergen Reactions    Levaquin [Levofloxacin In D5w] Itching       Subjective:    Review of Systems:  Constitutional: Denies any fever, chills, (+) fatigue. Wound: Denies any rash, skin color changes or wound problems. Resp: Denies any cough, (+)shortness of breath. CV: Denies any chest pain, orthopnea or syncope. MS: Denies myalgias, (+)arthralgias. GI: Denies any nausea, vomiting, diarrhea, (+)constipation, melena, hematochezia. (+)reflux  : Denies any hematuria, hesitancy or dysuria. NEURO: Denies seizures, headache. Objective:    /76 (Site: Right Lower Arm, Position: Sitting, Cuff Size: Large Adult)   Pulse 66   Temp 97.2 °F (36.2 °C) (Infrared)   Resp 18   Ht 6' 1.75\" (1.873 m)   Wt (!) 395 lb 9.6 oz (179.4 kg)   BMI 51.14 kg/m²   Constitutional: Alert and oriented to person, place and time. Well-developed, well- nourished. Head: Normocephalic and atraumatic  Neck: Supple. Eyes: EOMI b/l. Conjunctivae normal.  No scleral icterus. Respiratory: Effort normal. No respiratory distress. Abdomen: Obese  Ext:  Movement x 4. No edema  Skin; Warm and dry, no visible rashes, lesions or ulcers.    Neuro: Cranial Nerves Grossly Intact; nml coordination    CBC   Lab Results   Component Value Date    WBC 10.8 03/05/2022    RBC 4.81 03/05/2022    HGB 12.6 03/05/2022    HCT 41.5 03/05/2022    MCV 86.3 03/05/2022    MCH 26.2 03/05/2022    MCHC 30.4 03/05/2022    RDW 15.7 03/15/2017     03/05/2022    MPV 10.1 03/05/2022 RBCMORP NORMAL 03/15/2017    SEGSPCT 68.9 03/05/2022    LABLYMP 20.8 03/05/2022    MONOPCT 5.5 03/05/2022    LABEOS 3.5 03/05/2022    BASO 0.4 03/05/2022    NRBC 0 03/05/2022    ANISOCYTOSIS 1+ 03/15/2017    SEGSABS 7.4 03/05/2022    LYMPHSABS 2.2 03/05/2022    MONOSABS 0.6 03/05/2022    EOSABS 0.4 03/05/2022    BASOSABS 0.0 03/05/2022        BMP/CMP   Lab Results   Component Value Date    GLUCOSE 127 03/05/2022    GLUCOSE 258 12/16/2017    CREATININE 0.7 03/05/2022    BUN 13 03/05/2022     03/05/2022    K 4.3 03/05/2022    K 4.8 04/09/2021     03/05/2022    CO2 28 03/05/2022    CALCIUM 9.3 03/05/2022    AST 14 03/05/2022    ALKPHOS 73 03/05/2022    PROT 6.6 03/05/2022    LABALBU 4.2 03/05/2022    BILITOT 0.3 03/05/2022    ALT 17 03/05/2022        PREALBUMIN   Lab Results   Component Value Date    PREALBUMIN 22.0 03/05/2022        VITAMIN B12   Lab Results   Component Value Date    UUTMVHJW30 258 03/05/2022        VITAMIN D   Lab Results   Component Value Date    VITD25 12 03/05/2022        PTH  Lab Results   Component Value Date    IPTH 30.7 03/05/2022       VITAMIN B1/ THIAMINE   No results found for: [id number]     LIPID SCREEN (FASTING)   Lab Results   Component Value Date    CHOL 101 03/05/2022    TRIG 154 03/05/2022    HDL 27 03/05/2022    LDLCALC 43 03/05/2022   ,     HGA1C (T2DM ONLY)   Lab Results   Component Value Date    LABA1C 7.0 03/05/2022    AVGG 150 03/05/2022        TSH   Lab Results   Component Value Date    TSH 2.900 03/05/2022        IRON   Lab Results   Component Value Date    IRON 50 03/05/2022        TIBC  Lab Results   Component Value Date    TIBC 310 03/05/2022       FERRITIN  Lab Results   Component Value Date    FERRITIN 141 03/05/2022       VITAMIN A  No results found for: RETINOL    NICOTINE  No results found for: NMET    UDS  No results found for: UDP    PSA  No results found for: LABPSA    GFR  Lab Results   Component Value Date    LABGLOM >90 03/05/2022       DEXA  No results found for this or any previous visit. Assessment:       Diagnosis Orders   1. BMI 50.0-59.9, adult ([facility] [address]     2. Vitamin D deficiency  Cholecalciferol (VITAMIN D3) 1.25 MG (17871 UT) CAPS   3. Chronic bilateral low back pain, unspecified whether sciatica present     4. Chronic congestive heart failure, unspecified heart failure type ([facility] [address]     5. Type 2 diabetes mellitus without complication, with long-term current use of insulin ([facility]     6. Hypertension, unspecified type     7. Gastroesophageal reflux disease, unspecified whether esophagitis present     8. AICD (automatic cardioverter/defibrillator) present     9. Paroxysmal atrial fibrillation (HCC)     10. MIKE on CPAP     11. Chronic pain of left knee     12. Hypercholesteremia         Plan:    · Behavior modification discussed in detail in regards to dietary habits. · Nutritional education occurred during visit. Continue following recommendations  per dietitian. · Improvement in fitness/exercise discussed with patient and the need for this  with/without surgery. · Cardiac Clearance needed prior to surgery- Dr. Petra Allen  · Pulmonary Clearance needed prior to surgery- Marylou Rubio PA-C  · Psychology evaluation with Dr. Kamille Jerome 4/7/22 and 5/3/22  · Physical Therapy referral  · EGD prior to any surgical intervention  · Encouraged to attend support groups. · Goal to lose 5% TBW prior to surgery. · Initial labs completed and reviewed- several pending  · A1C 7.0- must remain < 8 prior to surgery. · Iron low at 50- to start 65mg Iron daily. · Vit D 12- to start weekly Vit D x 12 weeks. Take with food. · Drug screen completed and reviewed  · Nicotine pending. Discussed risks of nicotine a/w bariatric surgery. Must be nicotine free at least 90 days prior to surgery. Avoid nicotine life long post-op. · EKG to be completed with cardiologist.   · Will need to be off work for 3-4 weeks post-bariatric surgery.   · No lifting/pushing/pulling over 20# for 4 weeks post-op  · No NSAIDS 10 days prior to bariatric surgery. Avoid NSAID use post-op  · Discussed Lovenox post-op bariatric surgery  · Awaiting LOMN  · Will continue following with multi-disciplinary team in preparation for bariatric surgery with the expectation of lifelong follow-up post-operatively. · Check on fluid restriction through CHF clinic. Return in about 1 month (around 4/9/2022) for Follow up. I spent over 30 minutes with the patient, with greater that 50% of that time spent on education, counseling and coordination of care.      Electronically signed by BART Yarbrough on 3/9/2022 at 3:02 PM This document is complete and the patient is ready for discharge.

## 2025-03-21 NOTE — PROGRESS NOTE ADULT - SUBJECTIVE AND OBJECTIVE BOX
Patient is a 64y old  Male who presents with a chief complaint of sent in from PCP office for hypotension (20 Mar 2025 19:12)      SUBJECTIVE / OVERNIGHT EVENTS:     T(C): 36.6 (03-21-25 @ 20:00), Max: 36.6 (03-21-25 @ 20:00)  HR: 105 (03-21-25 @ 20:00) (89 - 105)  BP: 96/58 (03-21-25 @ 20:00) (96/58 - 119/66)  RR: 18 (03-21-25 @ 20:00) (15 - 18)  SpO2: 95% (03-21-25 @ 20:00) (93% - 99%)      MEDICATIONS  (STANDING):  albuterol/ipratropium for Nebulization 3 milliLiter(s) Nebulizer every 8 hours  aspirin enteric coated 81 milliGRAM(s) Oral daily  buMETAnide 2 milliGRAM(s) Oral two times a day  clopidogrel Tablet 75 milliGRAM(s) Oral daily  dextrose 5%. 1000 milliLiter(s) (50 mL/Hr) IV Continuous <Continuous>  dextrose 50% Injectable 25 Gram(s) IV Push once  dextrose 50% Injectable 25 Gram(s) IV Push once  fluticasone propionate/ salmeterol 250-50 MICROgram(s) Diskus 1 Dose(s) Inhalation two times a day  glucagon  Injectable 1 milliGRAM(s) IntraMuscular once  insulin glargine Injectable (LANTUS) 20 Unit(s) SubCutaneous at bedtime  insulin lispro (ADMELOG) corrective regimen sliding scale   SubCutaneous at bedtime  insulin lispro (ADMELOG) corrective regimen sliding scale   SubCutaneous three times a day before meals  insulin lispro Injectable (ADMELOG) 8 Unit(s) SubCutaneous before breakfast  insulin lispro Injectable (ADMELOG) 8 Unit(s) SubCutaneous before lunch  insulin lispro Injectable (ADMELOG) 8 Unit(s) SubCutaneous before dinner  metoprolol succinate ER 25 milliGRAM(s) Oral daily  pantoprazole    Tablet 40 milliGRAM(s) Oral before breakfast  rosuvastatin 10 milliGRAM(s) Oral at bedtime  tamsulosin 0.4 milliGRAM(s) Oral at bedtime    MEDICATIONS  (PRN):  acetaminophen     Tablet .. 650 milliGRAM(s) Oral every 6 hours PRN Temp greater or equal to 38C (100.4F), Mild Pain (1 - 3)  dextrose Oral Gel 15 Gram(s) Oral once PRN Blood Glucose LESS THAN 70 milliGRAM(s)/deciliter  hydrocodone/homatropine Syrup 5 milliLiter(s) Oral every 6 hours PRN Cough  melatonin 3 milliGRAM(s) Oral at bedtime PRN Insomnia   hours PRN Cough  melatonin 3 milliGRAM(s) Oral at bedtime PRN Insomnia      CAPILLARY BLOOD GLUCOSE      POCT Blood Glucose.: 139 mg/dL (20 Mar 2025 21:06)  POCT Blood Glucose.: 388 mg/dL (20 Mar 2025 17:08)  POCT Blood Glucose.: 241 mg/dL (20 Mar 2025 11:26)  POCT Blood Glucose.: 203 mg/dL (20 Mar 2025 07:26)    I&O's Summary    20 Mar 2025 07:01  -  20 Mar 2025 22:11  --------------------------------------------------------  IN: 400 mL / OUT: 400 mL / NET: 0 mL      T(C): 36.5 (03-20-25 @ 20:39), Max: 36.5 (03-20-25 @ 11:08)  HR: 87 (03-20-25 @ 20:39) (70 - 87)  BP: 94/62 (03-20-25 @ 20:39) (94/62 - 97/59)  RR: 18 (03-20-25 @ 20:39) (18 - 18)  SpO2: 96% (03-20-25 @ 20:39) (94% - 97%)    PHYSICAL EXAM:  GENERAL: NAD, well-developed  HEAD:  Atraumatic, Normocephalic  EYES: EOMI, PERRLA, conjunctiva and sclera clear  NECK: Supple, No JVD  CHEST/LUNG: Clear to auscultation bilaterally; No wheeze  HEART: Regular rate and rhythm; No murmurs, rubs, or gallops  ABDOMEN: Soft, Nontender, Nondistended; Bowel sounds present  EXTREMITIES:  2+ Peripheral Pulses, No clubbing, cyanosis, or edema  PSYCH: AAOx3  NEUROLOGY: non-focal  SKIN: No rashes or lesions    LABS:                        12.9   8.55  )-----------( 174      ( 20 Mar 2025 04:46 )             42.3     03-20    132[L]  |  87[L]  |  115[H]  ----------------------------<  328[H]  3.9   |  27  |  3.23[H]    Ca    9.5      20 Mar 2025 17:30    TPro  8.3  /  Alb  4.0  /  TBili  0.9  /  DBili  x   /  AST  17  /  ALT  13  /  AlkPhos  167[H]  03-19    PT/INR - ( 19 Mar 2025 12:11 )   PT: 13.2 sec;   INR: 1.15 ratio         PTT - ( 19 Mar 2025 12:11 )  PTT:33.7 sec      Urinalysis Basic - ( 20 Mar 2025 17:30 )    Color: x / Appearance: x / SG: x / pH: x  Gluc: 328 mg/dL / Ketone: x  / Bili: x / Urobili: x   Blood: x / Protein: x / Nitrite: x   Leuk Esterase: x / RBC: x / WBC x   Sq Epi: x / Non Sq Epi: x / Bacteria: x        RADIOLOGY & ADDITIONAL TESTS:    Imaging Personally Reviewed:    Consultant(s) Notes Reviewed:      Care Discussed with Consultants/Other Providers:

## 2025-03-22 LAB
ANION GAP SERPL CALC-SCNC: 18 MMOL/L — HIGH (ref 5–17)
BUN SERPL-MCNC: 103 MG/DL — HIGH (ref 7–23)
CALCIUM SERPL-MCNC: 10 MG/DL — SIGNIFICANT CHANGE UP (ref 8.4–10.5)
CHLORIDE SERPL-SCNC: 90 MMOL/L — LOW (ref 96–108)
CO2 SERPL-SCNC: 25 MMOL/L — SIGNIFICANT CHANGE UP (ref 22–31)
CREAT SERPL-MCNC: 2.35 MG/DL — HIGH (ref 0.5–1.3)
EGFR: 31 ML/MIN/1.73M2 — LOW
EGFR: 31 ML/MIN/1.73M2 — LOW
GLUCOSE BLDC GLUCOMTR-MCNC: 178 MG/DL — HIGH (ref 70–99)
GLUCOSE BLDC GLUCOMTR-MCNC: 272 MG/DL — HIGH (ref 70–99)
GLUCOSE BLDC GLUCOMTR-MCNC: 287 MG/DL — HIGH (ref 70–99)
GLUCOSE BLDC GLUCOMTR-MCNC: 388 MG/DL — HIGH (ref 70–99)
GLUCOSE SERPL-MCNC: 403 MG/DL — HIGH (ref 70–99)
POTASSIUM SERPL-MCNC: 4.5 MMOL/L — SIGNIFICANT CHANGE UP (ref 3.5–5.3)
POTASSIUM SERPL-SCNC: 4.5 MMOL/L — SIGNIFICANT CHANGE UP (ref 3.5–5.3)
SODIUM SERPL-SCNC: 133 MMOL/L — LOW (ref 135–145)

## 2025-03-22 PROCEDURE — 99231 SBSQ HOSP IP/OBS SF/LOW 25: CPT

## 2025-03-22 RX ORDER — SACUBITRIL AND VALSARTAN 6; 6 MG/1; MG/1
1 PELLET ORAL
Refills: 0 | Status: DISCONTINUED | OUTPATIENT
Start: 2025-03-22 | End: 2025-03-23

## 2025-03-22 RX ORDER — DAPAGLIFLOZIN 5 MG/1
10 TABLET, FILM COATED ORAL DAILY
Refills: 0 | Status: DISCONTINUED | OUTPATIENT
Start: 2025-03-22 | End: 2025-03-23

## 2025-03-22 RX ORDER — EMPAGLIFLOZIN 25 MG/1
10 TABLET, FILM COATED ORAL DAILY
Refills: 0 | Status: DISCONTINUED | OUTPATIENT
Start: 2025-03-22 | End: 2025-03-22

## 2025-03-22 RX ORDER — SPIRONOLACTONE 25 MG
25 TABLET ORAL DAILY
Refills: 0 | Status: DISCONTINUED | OUTPATIENT
Start: 2025-03-22 | End: 2025-03-23

## 2025-03-22 RX ADMIN — Medication 81 MILLIGRAM(S): at 11:58

## 2025-03-22 RX ADMIN — CLOPIDOGREL BISULFATE 75 MILLIGRAM(S): 75 TABLET, FILM COATED ORAL at 11:58

## 2025-03-22 RX ADMIN — INSULIN LISPRO 3: 100 INJECTION, SOLUTION INTRAVENOUS; SUBCUTANEOUS at 11:58

## 2025-03-22 RX ADMIN — INSULIN LISPRO 8 UNIT(S): 100 INJECTION, SOLUTION INTRAVENOUS; SUBCUTANEOUS at 18:03

## 2025-03-22 RX ADMIN — IPRATROPIUM BROMIDE AND ALBUTEROL SULFATE 3 MILLILITER(S): .5; 2.5 SOLUTION RESPIRATORY (INHALATION) at 13:31

## 2025-03-22 RX ADMIN — BUMETANIDE 2 MILLIGRAM(S): 1 TABLET ORAL at 05:13

## 2025-03-22 RX ADMIN — INSULIN LISPRO 8 UNIT(S): 100 INJECTION, SOLUTION INTRAVENOUS; SUBCUTANEOUS at 11:59

## 2025-03-22 RX ADMIN — Medication 25 MILLIGRAM(S): at 13:30

## 2025-03-22 RX ADMIN — DAPAGLIFLOZIN 10 MILLIGRAM(S): 5 TABLET, FILM COATED ORAL at 13:30

## 2025-03-22 RX ADMIN — IPRATROPIUM BROMIDE AND ALBUTEROL SULFATE 3 MILLILITER(S): .5; 2.5 SOLUTION RESPIRATORY (INHALATION) at 05:15

## 2025-03-22 RX ADMIN — Medication 40 MILLIGRAM(S): at 05:13

## 2025-03-22 RX ADMIN — TAMSULOSIN HYDROCHLORIDE 0.4 MILLIGRAM(S): 0.4 CAPSULE ORAL at 21:30

## 2025-03-22 RX ADMIN — INSULIN GLARGINE-YFGN 20 UNIT(S): 100 INJECTION, SOLUTION SUBCUTANEOUS at 21:29

## 2025-03-22 RX ADMIN — SACUBITRIL AND VALSARTAN 1 TABLET(S): 6; 6 PELLET ORAL at 19:30

## 2025-03-22 RX ADMIN — INSULIN LISPRO 8 UNIT(S): 100 INJECTION, SOLUTION INTRAVENOUS; SUBCUTANEOUS at 08:18

## 2025-03-22 RX ADMIN — INSULIN LISPRO 1: 100 INJECTION, SOLUTION INTRAVENOUS; SUBCUTANEOUS at 18:03

## 2025-03-22 RX ADMIN — METOPROLOL SUCCINATE 25 MILLIGRAM(S): 50 TABLET, EXTENDED RELEASE ORAL at 05:13

## 2025-03-22 RX ADMIN — INSULIN LISPRO 5: 100 INJECTION, SOLUTION INTRAVENOUS; SUBCUTANEOUS at 08:19

## 2025-03-22 RX ADMIN — ROSUVASTATIN CALCIUM 10 MILLIGRAM(S): 5 TABLET, FILM COATED ORAL at 21:29

## 2025-03-22 RX ADMIN — INSULIN LISPRO 1: 100 INJECTION, SOLUTION INTRAVENOUS; SUBCUTANEOUS at 21:29

## 2025-03-22 RX ADMIN — BUMETANIDE 2 MILLIGRAM(S): 1 TABLET ORAL at 16:06

## 2025-03-22 RX ADMIN — IPRATROPIUM BROMIDE AND ALBUTEROL SULFATE 3 MILLILITER(S): .5; 2.5 SOLUTION RESPIRATORY (INHALATION) at 21:30

## 2025-03-22 RX ADMIN — Medication 1 DOSE(S): at 18:04

## 2025-03-22 RX ADMIN — Medication 1 DOSE(S): at 05:14

## 2025-03-22 NOTE — PROGRESS NOTE ADULT - NS ATTEND AMEND GEN_ALL_CORE FT
- TTE 9/2024 with known EF 20% unchanged from prior  - Hold below sHF GDMT  given hypotension. Will likely have to modify diuretics for DC  --- Toprol 25mg daily    --- Entresto 24/26mg BID   --- Aldactone 25mg as OP  --- Jardiance 10mg daily  --- Bumex 2mg PO BID  --- Will not restart Metolazone  - As per wife, dry weight ~247lbs  - Plan for [facility] 3/21
Chronic systolic heart failure.   ·  Plan:    - TTE 9/2024 with known EF 20% unchanged from prior  - Hold below sHF GDMT given hypotension. Will likely have to modify diuretics for DC  --- Entresto 24/26mg BID   --- Aldactone 25mg as OP  --- Jardiance 10mg daily  --- Bumex 2mg PO BID  --- Will not retsart Metolazone  - As per wife, dry weight ~247lbs  - 3/19 restarted Toprol 25mg daily    - Plan for [facility] 3/21  - RHC 3/21 - Ra 12, RV 50/11, PCWP 21, PA 51/27/37  - Restart home dose Bumex 2mg PO BID given mildly elevated PCWP  - Resume Aldactone, Jardiance, Farxiga, Entresto
Chronic systolic heart failure.   ·  Plan:    - TTE 9/2024 with known EF 20% unchanged from prior  - Restart sHF GDMT as noted below  --- Entresto 24/26mg BID   --- Aldactone 25mg daily  --- Jardiance 10mg daily  --- Bumex 2mg PO BID  --- Will not retsart Metolazone  - As per wife, dry weight ~247lbs  - 3/19 restarted Toprol 25mg daily    - Plan for [facility] 3/21  - RHC 3/21 - Ra 12, RV 50/11, PCWP 21, PA 51/27/37

## 2025-03-22 NOTE — PROGRESS NOTE ADULT - PROBLEM SELECTOR PLAN 3
mild exacerbation likely due to viral infection - slightly improved per patient  afebrile  s/p zpak as outpatient, monitor off further abx for now   sating well on RA  nebs Q8 atc  will hold steroids for now

## 2025-03-22 NOTE — PROGRESS NOTE ADULT - SUBJECTIVE AND OBJECTIVE BOX
Patient is a 64y old  Male who presents with a chief complaint of sent in from PCP office for hypotension (20 Mar 2025 19:12)      SUBJECTIVE / OVERNIGHT EVENTS:     T(C): 36.5 (03-22-25 @ 11:52), Max: 36.5 (03-22-25 @ 11:52)  HR: 97 (03-22-25 @ 16:04) (86 - 97)  BP: 100/64 (03-22-25 @ 19:29) (96/60 - 109/75)  RR: 18 (03-22-25 @ 13:27) (18 - 18)  SpO2: 96% (03-22-25 @ 13:27) (96% - 98%)      MEDICATIONS  (STANDING):  albuterol/ipratropium for Nebulization 3 milliLiter(s) Nebulizer every 8 hours  aspirin enteric coated 81 milliGRAM(s) Oral daily  buMETAnide 2 milliGRAM(s) Oral two times a day  clopidogrel Tablet 75 milliGRAM(s) Oral daily  dapagliflozin 10 milliGRAM(s) Oral daily  dextrose 5%. 1000 milliLiter(s) (50 mL/Hr) IV Continuous <Continuous>  dextrose 50% Injectable 25 Gram(s) IV Push once  dextrose 50% Injectable 25 Gram(s) IV Push once  fluticasone propionate/ salmeterol 250-50 MICROgram(s) Diskus 1 Dose(s) Inhalation two times a day  glucagon  Injectable 1 milliGRAM(s) IntraMuscular once  insulin glargine Injectable (LANTUS) 20 Unit(s) SubCutaneous at bedtime  insulin lispro (ADMELOG) corrective regimen sliding scale   SubCutaneous at bedtime  insulin lispro (ADMELOG) corrective regimen sliding scale   SubCutaneous three times a day before meals  insulin lispro Injectable (ADMELOG) 8 Unit(s) SubCutaneous before breakfast  insulin lispro Injectable (ADMELOG) 8 Unit(s) SubCutaneous before lunch  insulin lispro Injectable (ADMELOG) 8 Unit(s) SubCutaneous before dinner  metoprolol succinate ER 25 milliGRAM(s) Oral daily  pantoprazole    Tablet 40 milliGRAM(s) Oral before breakfast  rosuvastatin 10 milliGRAM(s) Oral at bedtime  sacubitril 24 mG/valsartan 26 mG 1 Tablet(s) Oral two times a day  spironolactone 25 milliGRAM(s) Oral daily  tamsulosin 0.4 milliGRAM(s) Oral at bedtime    MEDICATIONS  (PRN):  acetaminophen     Tablet .. 650 milliGRAM(s) Oral every 6 hours PRN Temp greater or equal to 38C (100.4F), Mild Pain (1 - 3)  dextrose Oral Gel 15 Gram(s) Oral once PRN Blood Glucose LESS THAN 70 milliGRAM(s)/deciliter  hydrocodone/homatropine Syrup 5 milliLiter(s) Oral every 6 hours PRN Cough  melatonin 3 milliGRAM(s) Oral at bedtime PRN Insomnia    PHYSICAL EXAM:  GENERAL: NAD, well-developed  HEAD:  Atraumatic, Normocephalic  EYES: EOMI, PERRLA, conjunctiva and sclera clear  NECK: Supple, No JVD  CHEST/LUNG: Clear to auscultation bilaterally; No wheeze  HEART: Regular rate and rhythm; No murmurs, rubs, or gallops  ABDOMEN: Soft, Nontender, Nondistended; Bowel sounds present  EXTREMITIES:  2+ Peripheral Pulses, No clubbing, cyanosis, or edema  PSYCH: AAOx3  NEUROLOGY: non-focal  SKIN: No rashes or lesions                          13.1   7.64  )-----------( 187      ( 21 Mar 2025 05:05 )             42.9               133|90|103<403  4.5|25|2.35  10.0,--,--  03-22 @ 05:13  Color: x / Appearance: x / SG: x / pH: x  Gluc: 328 mg/dL / Ketone: x  / Bili: x / Urobili: x   Blood: x / Protein: x / Nitrite: x   Leuk Esterase: x / RBC: x / WBC x   Sq Epi: x / Non Sq Epi: x / Bacteria: x        RADIOLOGY & ADDITIONAL TESTS:    Imaging Personally Reviewed:    Consultant(s) Notes Reviewed:      Care Discussed with Consultants/Other Providers:

## 2025-03-22 NOTE — PROGRESS NOTE ADULT - PROBLEM SELECTOR PLAN 4
monitor volume status   likely need to restart fluids soon

## 2025-03-22 NOTE — PROGRESS NOTE ADULT - ASSESSMENT
64M h/o CHF with severely reduced LV function s/p AICD, CAD s/p PCI, DM2, L BKA, CKD3, COPD, GERD sent in to ED by PCP office for hypotension. the patient states he was in heart failure a few days ago and was started on metolazone 3 days ago by his pcp (metolazone filled 11 days ago). Wife states he was taking metolazone daily for about a week and lost about 40 lbs in that time. he has been taking all of his medications as prescribed. He developed a cough with shortness of breath about a week ago and was given a zpak which he finished. +sick contact- his grandchild has a viral infection. Overall, he feels slightly better but coughing has not improved. cough productive of greenish sputum at times though that has decreased. He denies any fever or chills. he has been taking his prn albuterol about 3x/day recently. currently, he denies any chest pain, sob, abdominal pain, change in appetite. he does c/o the persistent cough and feels he is unable to bring up sputum.   noticed with high creatinine      1- MANJIT   2- chf hx   3- hypotension on admission   4- cardiomyopathy     manjit in setting of pre renal azotemia due to diuretics and decrease perfusion due to hypotension   creatinine worsening   bp is now baseline  received ivf and albumin  restart bumex 4 mg daily and entresto 24/26 mg bid   heme  for paraproteinemia   strict I/O 64M h/o CHF with severely reduced LV function s/p AICD, CAD s/p PCI, DM2, L BKA, CKD3, COPD, GERD sent in to ED by PCP office for hypotension. the patient states he was in heart failure a few days ago and was started on metolazone 3 days ago by his pcp (metolazone filled 11 days ago). Wife states he was taking metolazone daily for about a week and lost about 40 lbs in that time. he has been taking all of his medications as prescribed. He developed a cough with shortness of breath about a week ago and was given a zpak which he finished. +sick contact- his grandchild has a viral infection. Overall, he feels slightly better but coughing has not improved. cough productive of greenish sputum at times though that has decreased. He denies any fever or chills. he has been taking his prn albuterol about 3x/day recently. currently, he denies any chest pain, sob, abdominal pain, change in appetite. he does c/o the persistent cough and feels he is unable to bring up sputum.   noticed with high creatinine      1- MANJIT   2- chf hx   3- hypotension on admission   4- cardiomyopathy     manjit in setting of pre renal azotemia due to diuretics and decrease perfusion due to hypotension   creatinine worsening  [ correction: cr is improving today 3/22]   bp is now baseline  received ivf and albumin  restart bumex 4 mg daily and entresto 24/26 mg bid   heme  for paraproteinemia   strict I/O

## 2025-03-22 NOTE — PROGRESS NOTE ADULT - SUBJECTIVE AND OBJECTIVE BOX
DATE OF SERVICE: 03-22-25 @ 12:49    Patient is a 64y old  Male who presents with a chief complaint of sent in from PCP office for hypotension (22 Mar 2025 08:09)      INTERVAL HISTORY: NAD     REVIEW OF SYSTEMS:  CONSTITUTIONAL: No weakness  EYES/ENT: No visual changes;  No throat pain   NECK: No pain or stiffness  RESPIRATORY: No cough, wheezing; No shortness of breath  CARDIOVASCULAR: No chest pain or palpitations  GASTROINTESTINAL: No abdominal  pain. No nausea, vomiting, or hematemesis  GENITOURINARY: No dysuria, frequency or hematuria  NEUROLOGICAL: No stroke like symptoms  SKIN: No rashes     MEDICATIONS:  buMETAnide 2 milliGRAM(s) Oral two times a day  metoprolol succinate ER 25 milliGRAM(s) Oral daily  sacubitril 24 mG/valsartan 26 mG 1 Tablet(s) Oral two times a day  spironolactone 25 milliGRAM(s) Oral daily        PHYSICAL EXAM:  T(C): 36.5 (03-22-25 @ 11:52), Max: 36.6 (03-21-25 @ 20:00)  HR: 86 (03-22-25 @ 11:52) (86 - 105)  BP: 109/75 (03-22-25 @ 11:52) (96/58 - 119/66)  RR: 18 (03-22-25 @ 11:52) (15 - 18)  SpO2: 98% (03-22-25 @ 11:52) (93% - 99%)  Wt(kg): --  I&O's Summary    21 Mar 2025 07:01  -  22 Mar 2025 07:00  --------------------------------------------------------  IN: 480 mL / OUT: 3200 mL / NET: -2720 mL    22 Mar 2025 07:01  -  22 Mar 2025 12:49  --------------------------------------------------------  IN: 480 mL / OUT: 700 mL / NET: -220 mL      Height (cm): 195.6 (03-21 @ 13:50)  Weight (kg): 108.9 (03-21 @ 13:50)  BMI (kg/m2): 28.5 (03-21 @ 13:50)  BSA (m2): 2.42 (03-21 @ 13:50)    Appearance: In no distress	  HEENT:    PERRL, EOMI	  Cardiovascular:  S1 S2, No JVD  Respiratory: Lungs clear to auscultation	  Gastrointestinal:  Soft, Non-tender, + BS	  Vascularature:  No edema of LE  Psychiatric: Appropriate affect   Neuro: no acute focal deficits                               13.1   7.64  )-----------( 187      ( 21 Mar 2025 05:05 )             42.9     03-22    133[L]  |  90[L]  |  103[H]  ----------------------------<  403[H]  4.5   |  25  |  2.35[H]    Ca    10.0      22 Mar 2025 05:13  Phos  3.9     03-21  Mg     2.6     03-21          Labs personally reviewed      ASSESSMENT/PLAN: 	  64-year-old male patient past medical history of Heart Failure with reduced EF% 20 (most recent echo Sept 2024), essential HTN, CKD3, type 2 DM on high doses of bedtime and on fluctuating preprandial insulin (past episode of hypoglycemia), CAD with past PCI on ASA and Plavix, LEFT BKA in [facility] in May 2024, last discharge from [facility] Sept 2024 who was brought in via EMS for hypotension.  Patient was evaluated at his PCPs office when he was found systolic in the 80s.  The past 2 days the patient has been experiencing lethargy and fatigue. Denies any fevers, chest pain, shortness of breath, right leg edema.  Does admit to dry cough.  On exam, found mentating AAOx3 with BP 90s to 100 systolic.  Endorses normal appetite.  Prior to today patient has been compliant with his home medication and diuretics.  Hypertension possibly from over diuresis at home versus infectious etiology.      Problem/Plan - 1:  ·  Problem: Hypotension  ·  Plan:  p/w low BP i/s/o recent viral infection and started on Metolazone daily dosing 3 days PTA by PCP  - Suspect volume depletion 2/2 addition of Metolazone especially given MANJIT on CKD  - Hold sHF GDMT including diuretics  - s/p albumin administration  - 3/21 restart home dose Bumex s/p RHC  - Resume CV meds for GDMT as below and monitor BP     Problem/Plan - 2: Elevated Troponin   ·  Plan:  Patient denies CP or SOB  - Will review ECG  - Possibly i/s/o decreased clearance given MANJIT on CKD  - Recent NST as noted below with predominant infarct  - c/w ASA, Rosuvastatin    Problem/Plan - 3: Chronic systolic heart failure.   ·  Plan:    - TTE 9/2024 with known EF 20% unchanged from prior  - Hold below sHF GDMT given hypotension. Will likely have to modify diuretics for DC  --- Entresto 24/26mg BID   --- Aldactone 25mg as OP  --- Jardiance 10mg daily  --- Bumex 2mg PO BID  --- Will not retsart Metolazone  - As per wife, dry weight ~247lbs  - 3/19 restarted Toprol 25mg daily    - Plan for CardioMems 3/21  - RHC 3/21 - Ra 12, RV 50/11, PCWP 21, PA 51/27/37  - Restart home dose Bumex 2mg PO BID given mildly elevated PCWP  - Resume Aldactone, Jardiance, Farxiga, Entresto     Problem/Plan - 4:  ·  Problem: CAD (coronary artery disease).   ·  Plan: c/w asa and statin  - Ordered Nuclear Stress test given CAD and WCT seen on tele  - NM stress test with large-sized, moderate to severe defect(s) in the mid to distal anterior, septal, apical and inferior walls that are predominantly fixed consistent with an infarction with minimal chantel-infarct ischemia.  - Given predominant infarct no role for cardiac cath    Problem/Plan - 5:  ·  Problem: MANJIT on CKD.   ·  Plan: Monitor BMP daily, dose meds per renal fx, avoid nephrotoxins.  - Hold Bumex, Jardiance, aldactone and Entresto given hypotension and MANJIT    Problem/Plan  6:  ·  Problem: ICD  ·  Plan:  s/p ICD extraction and re-implant, (9/3)            KRISHNA Apodaca DO [facility]  Cardiovascular Medicine  [address] [place] [address], [address]  Office: [phone]  Available via call/text on [employer] Teams

## 2025-03-22 NOTE — PROGRESS NOTE ADULT - PROBLEM SELECTOR PROBLEM 5
Diabetes mellitus with hyperglycemia

## 2025-03-22 NOTE — PROGRESS NOTE ADULT - SUBJECTIVE AND OBJECTIVE BOX
Interventional Cardiology Post Cath Progress Note  CARDIAC CATH LAB, ACP TEAM   [phone]      CHIEF COMPLAINT: Patient is a 64y old  Male who presents with a chief complaint of sent in from PCP office for hypotension (21 Mar 2025 22:45)      HPI:  patient is a poor historian, some history obtained with collateral from wife  64M h/o CHF with severely reduced LV function s/p AICD, CAD s/p PCI, DM2, L BKA, CKD3, COPD, GERD sent in to ED by PCP office for hypotension. the patient states he was in heart failure a few days ago and was started on metolazone 3 days ago by his pcp (metolazone filled 11 days ago). Wife states he was taking metolazone daily for about a week and lost about 40 lbs in that time. he has been taking all of his medications as prescribed. He developed a cough with shortness of breath about a week ago and was given a zpak which he finished. +sick contact- his grandchild has a viral infection. Overall, he feels slightly better but coughing has not improved. cough productive of greenish sputum at times though that has decreased. He denies any fever or chills. he has been taking his prn albuterol about 3x/day recently. currently, he denies any chest pain, sob, abdominal pain, change in appetite. he does c/o the persistent cough and feels he is unable to bring up sputum.  (19 Mar 2025 16:56)        Subjective/Observations: patient seen and examined.  denies chest pain, dyspena, dizziness, palpitations, N&V, HA      Review of Systems all WNL except below indicated:    Constitutional: [ ] Fever [ ] Chills [ ] Fatigue [ ] Weight change   HEENT: [ ] Blurred vision [ ] Eye Pain [ ] Headache [ ] Runny nose [ ] Sore Throat   Respiratory: [ ] Cough [ ] Wheezing [ ] Shortness of breath  Cardiovascular: [ ] Chest Pain [ ] Palpitations [ ] GREWAL [ ] PND [ ] Orthopnea  Gastrointestinal: [ ] Abdominal Pain [ ] Diarrhea [ ] Constipation [ ] Hemorrhoids [ ] Nausea [ ] Vomiting  Genitourinary: [ ] Nocturia [ ] Dysuria [ ] Incontinence  Extremities: [ ] Swelling [ ] Joint Pain  Neurologic: [ ] Focal deficit [ ] Paresthesias [ ] Syncope  Lymphatic: [ ] Swelling [ ] Lymphadenopathy   Skin: [ ] Rash [ ] Ecchymoses [ ] Wounds [ ] Lesions  Psychiatry: [ ] Depression [ ] Suicidal/Homicidal Ideation [ ] Anxiety [ ] Sleep Disturbances  [x ] 10 point review of systems is otherwise negative except as mentioned above            [ ]Unable to obtain    Objective:  Vital Signs Last 24 Hrs  T(C): 36.6 (22 Mar 2025 08:01), Max: 36.6 (21 Mar 2025 20:00)  T(F): 97.9 (22 Mar 2025 08:01), Max: 97.9 (21 Mar 2025 20:00)  HR: 92 (22 Mar 2025 08:01) (89 - 105)  BP: 107/68 (22 Mar 2025 08:01) (96/58 - 119/66)  BP(mean): --  RR: 18 (22 Mar 2025 08:01) (15 - 18)  SpO2: 98% (22 Mar 2025 08:01) (93% - 99%)    Parameters below as of 22 Mar 2025 08:01  Patient On (Oxygen Delivery Method): room air        03-21-25 @ 07:01  -  03-22-25 @ 07:00  --------------------------------------------------------  IN: 480 mL / OUT: 3200 mL / NET: -2720 mL        PAST MEDICAL & SURGICAL HISTORY:  Stented coronary artery      Diabetes      AICD (automatic cardioverter/defibrillator) present      Hypertension      Heart failure with reduced ejection fraction      History of ischemic cardiomyopathy      History of COPD      H/O gastroesophageal reflux (GERD)      2019 novel coronavirus disease (COVID-19)      COVID-19 vaccine series completed      H/O vasectomy  20 yrs ago (2000)          SOCIAL HISTORY:  No tobacco, ethanol, or drug abuse.    FAMILY HISTORY:  Family history of COPD (chronic obstructive pulmonary disease) (Sibling)    Family history of cardiac disorder  Paternal      No family history of acute MI or sudden cardiac death.    MEDICATIONS  (STANDING):  albuterol/ipratropium for Nebulization 3 milliLiter(s) Nebulizer every 8 hours  aspirin enteric coated 81 milliGRAM(s) Oral daily  buMETAnide 2 milliGRAM(s) Oral two times a day  clopidogrel Tablet 75 milliGRAM(s) Oral daily  dextrose 5%. 1000 milliLiter(s) (50 mL/Hr) IV Continuous <Continuous>  dextrose 50% Injectable 25 Gram(s) IV Push once  dextrose 50% Injectable 25 Gram(s) IV Push once  fluticasone propionate/ salmeterol 250-50 MICROgram(s) Diskus 1 Dose(s) Inhalation two times a day  glucagon  Injectable 1 milliGRAM(s) IntraMuscular once  insulin glargine Injectable (LANTUS) 20 Unit(s) SubCutaneous at bedtime  insulin lispro (ADMELOG) corrective regimen sliding scale   SubCutaneous at bedtime  insulin lispro (ADMELOG) corrective regimen sliding scale   SubCutaneous three times a day before meals  insulin lispro Injectable (ADMELOG) 8 Unit(s) SubCutaneous before breakfast  insulin lispro Injectable (ADMELOG) 8 Unit(s) SubCutaneous before lunch  insulin lispro Injectable (ADMELOG) 8 Unit(s) SubCutaneous before dinner  metoprolol succinate ER 25 milliGRAM(s) Oral daily  pantoprazole    Tablet 40 milliGRAM(s) Oral before breakfast  rosuvastatin 10 milliGRAM(s) Oral at bedtime  tamsulosin 0.4 milliGRAM(s) Oral at bedtime    MEDICATIONS  (PRN):  acetaminophen     Tablet .. 650 milliGRAM(s) Oral every 6 hours PRN Temp greater or equal to 38C (100.4F), Mild Pain (1 - 3)  dextrose Oral Gel 15 Gram(s) Oral once PRN Blood Glucose LESS THAN 70 milliGRAM(s)/deciliter  hydrocodone/homatropine Syrup 5 milliLiter(s) Oral every 6 hours PRN Cough  melatonin 3 milliGRAM(s) Oral at bedtime PRN Insomnia      Allergies    doxepin (Other)  Zosyn (Pruritus)  ceftriaxone (Rash)  vancomycin (Rash (Mild to Mod))  penicillins (Short breath; Rash)    Intolerances                                13.1   7.64  )-----------( 187      ( 21 Mar 2025 05:05 )             42.9     03-22    133[L]  |  90[L]  |  103[H]  ----------------------------<  403[H]  4.5   |  25  |  2.35[H]    Ca    10.0      22 Mar 2025 05:13  Phos  3.9     03-21  Mg     2.6     03-21        Urinalysis Basic - ( 22 Mar 2025 05:13 )    Color: x / Appearance: x / SG: x / pH: x  Gluc: 403 mg/dL / Ketone: x  / Bili: x / Urobili: x   Blood: x / Protein: x / Nitrite: x   Leuk Esterase: x / RBC: x / WBC x   Sq Epi: x / Non Sq Epi: x / Bacteria: x        Physical Exam:  No apparent distress, alert and oriented times three, appropriate affect  Clear to auscultation with no wheezing, ronchi or crackles  Regular rate and rhythm with no murmur, rub or gallop  Positive bowel sounds, soft, non-tender, non-distended, no masses/guarding or rebound tenderness  Right groin w/o bleeding or hematoma.  soft, nont tender.  Pulses in the right lower extremity are (palpable/audible by doppler/absent).   Denies chest pain, denies groin/leg/foot: pain, numbness or tingling         < from: Cardiac Catheterization (03.21.25 @ 14:37) >  Procedures Performed   Procedures:              1.    Venous Access - Right Femoral     2.    RHC   3.    CardioMemsImplant   4.    Ultrasound Guided Access     Indications:               Congestive heart failure, acute on chronic  systolic    Conclusions:   Successful implantation of CardioMEMS PA Sensor in L PA.     RHC:   RA 12 mmHg   RV 50/11 mmHg   PA 51/27/37 mmHg   PCWP 19 mmHg   CO/CI 5.6 L/min / 2.3 L/min/m2     NCT NUMBER: [id number]   CPT Code: 83501 Modifier: Q0   Z00.6     MODERATE SEDATION STATEMENT: Moderate sedation was administered and  monitored under the direct supervision of  TELLO PATTERSON MD from 2:37pm to 3:20pm.   Recommendations:     Return to [facility] for continued monitoring and access site management.      ASA 81mg + plavix 75mg for one month followed by ASA monotherapy  indefinitely.    Case results discussed with patient, his wife, and primary  cardiologist - Cameron.  Acute complication:    No complications     < end of copied text >    < from: TTE Limited W or WO Ultrasound Enhancing Agent (09.04.24 @ 17:26) >     CONCLUSIONS:      1. Left ventricular systolic function is severely decreased.   2. Trace pericardial effusion, trace pericardial effusion noted adjacent to the posterior left ventricle and trace pericardial effusion noted adjacent to the rightatrium with no evidence of hemodynamic compromise (or echocardiographic evidence of cardiac tamponade): no diastolic collapse of right atrium, exceding 1/3 of the cardiac cycle, no early diastolic inversion of the right ventricle and respiratory variation across the tricuspid valve E wave is not greater than 60%.   3. Compared to the transthoracic echocardiogram performed on 8/27/2024, there have been no significant interval changes.    < end of copied text >     Interventional Cardiology Post Cath Progress Note  CARDIAC CATH LAB, ACP TEAM   [phone]      CHIEF COMPLAINT: Patient is a 64y old  Male who presents with a chief complaint of sent in from PCP office for hypotension (21 Mar 2025 22:45)      HPI:  patient is a poor historian, some history obtained with collateral from wife  64M h/o CHF with severely reduced LV function s/p AICD, CAD s/p PCI, DM2, L BKA, CKD3, COPD, GERD sent in to ED by PCP office for hypotension. the patient states he was in heart failure a few days ago and was started on metolazone 3 days ago by his pcp (metolazone filled 11 days ago). Wife states he was taking metolazone daily for about a week and lost about 40 lbs in that time. he has been taking all of his medications as prescribed. He developed a cough with shortness of breath about a week ago and was given a zpak which he finished. +sick contact- his grandchild has a viral infection. Overall, he feels slightly better but coughing has not improved. cough productive of greenish sputum at times though that has decreased. He denies any fever or chills. he has been taking his prn albuterol about 3x/day recently. currently, he denies any chest pain, sob, abdominal pain, change in appetite. he does c/o the persistent cough and feels he is unable to bring up sputum.  (19 Mar 2025 16:56)        Subjective/Observations: patient seen and examined.  denies chest pain, dyspena, dizziness, palpitations, N&V, HA      Review of Systems all WNL except below indicated:    Constitutional: [ ] Fever [ ] Chills [ ] Fatigue [ ] Weight change   HEENT: [ ] Blurred vision [ ] Eye Pain [ ] Headache [ ] Runny nose [ ] Sore Throat   Respiratory: [ ] Cough [ ] Wheezing [ ] Shortness of breath  Cardiovascular: [ ] Chest Pain [ ] Palpitations [ ] GREWAL [ ] PND [ ] Orthopnea  Gastrointestinal: [ ] Abdominal Pain [ ] Diarrhea [ ] Constipation [ ] Hemorrhoids [ ] Nausea [ ] Vomiting  Genitourinary: [ ] Nocturia [ ] Dysuria [ ] Incontinence  Extremities: [ ] Swelling [ ] Joint Pain  Neurologic: [ ] Focal deficit [ ] Paresthesias [ ] Syncope  Lymphatic: [ ] Swelling [ ] Lymphadenopathy   Skin: [ ] Rash [ ] Ecchymoses [ ] Wounds [ ] Lesions  Psychiatry: [ ] Depression [ ] Suicidal/Homicidal Ideation [ ] Anxiety [ ] Sleep Disturbances  [x ] 10 point review of systems is otherwise negative except as mentioned above            [ ]Unable to obtain    Objective:  Vital Signs Last 24 Hrs  T(C): 36.6 (22 Mar 2025 08:01), Max: 36.6 (21 Mar 2025 20:00)  T(F): 97.9 (22 Mar 2025 08:01), Max: 97.9 (21 Mar 2025 20:00)  HR: 92 (22 Mar 2025 08:01) (89 - 105)  BP: 107/68 (22 Mar 2025 08:01) (96/58 - 119/66)  BP(mean): --  RR: 18 (22 Mar 2025 08:01) (15 - 18)  SpO2: 98% (22 Mar 2025 08:01) (93% - 99%)    Parameters below as of 22 Mar 2025 08:01  Patient On (Oxygen Delivery Method): room air        03-21-25 @ 07:01  -  03-22-25 @ 07:00  --------------------------------------------------------  IN: 480 mL / OUT: 3200 mL / NET: -2720 mL        PAST MEDICAL & SURGICAL HISTORY:  Stented coronary artery      Diabetes      AICD (automatic cardioverter/defibrillator) present      Hypertension      Heart failure with reduced ejection fraction      History of ischemic cardiomyopathy      History of COPD      H/O gastroesophageal reflux (GERD)      2019 novel coronavirus disease (COVID-19)      COVID-19 vaccine series completed      H/O vasectomy  20 yrs ago (2000)          SOCIAL HISTORY:  No tobacco, ethanol, or drug abuse.    FAMILY HISTORY:  Family history of COPD (chronic obstructive pulmonary disease) (Sibling)    Family history of cardiac disorder  Paternal      No family history of acute MI or sudden cardiac death.    MEDICATIONS  (STANDING):  albuterol/ipratropium for Nebulization 3 milliLiter(s) Nebulizer every 8 hours  aspirin enteric coated 81 milliGRAM(s) Oral daily  buMETAnide 2 milliGRAM(s) Oral two times a day  clopidogrel Tablet 75 milliGRAM(s) Oral daily  dextrose 5%. 1000 milliLiter(s) (50 mL/Hr) IV Continuous <Continuous>  dextrose 50% Injectable 25 Gram(s) IV Push once  dextrose 50% Injectable 25 Gram(s) IV Push once  fluticasone propionate/ salmeterol 250-50 MICROgram(s) Diskus 1 Dose(s) Inhalation two times a day  glucagon  Injectable 1 milliGRAM(s) IntraMuscular once  insulin glargine Injectable (LANTUS) 20 Unit(s) SubCutaneous at bedtime  insulin lispro (ADMELOG) corrective regimen sliding scale   SubCutaneous at bedtime  insulin lispro (ADMELOG) corrective regimen sliding scale   SubCutaneous three times a day before meals  insulin lispro Injectable (ADMELOG) 8 Unit(s) SubCutaneous before breakfast  insulin lispro Injectable (ADMELOG) 8 Unit(s) SubCutaneous before lunch  insulin lispro Injectable (ADMELOG) 8 Unit(s) SubCutaneous before dinner  metoprolol succinate ER 25 milliGRAM(s) Oral daily  pantoprazole    Tablet 40 milliGRAM(s) Oral before breakfast  rosuvastatin 10 milliGRAM(s) Oral at bedtime  tamsulosin 0.4 milliGRAM(s) Oral at bedtime    MEDICATIONS  (PRN):  acetaminophen     Tablet .. 650 milliGRAM(s) Oral every 6 hours PRN Temp greater or equal to 38C (100.4F), Mild Pain (1 - 3)  dextrose Oral Gel 15 Gram(s) Oral once PRN Blood Glucose LESS THAN 70 milliGRAM(s)/deciliter  hydrocodone/homatropine Syrup 5 milliLiter(s) Oral every 6 hours PRN Cough  melatonin 3 milliGRAM(s) Oral at bedtime PRN Insomnia      Allergies    doxepin (Other)  Zosyn (Pruritus)  ceftriaxone (Rash)  vancomycin (Rash (Mild to Mod))  penicillins (Short breath; Rash)    Intolerances                                13.1   7.64  )-----------( 187      ( 21 Mar 2025 05:05 )             42.9     03-22    133[L]  |  90[L]  |  103[H]  ----------------------------<  403[H]  4.5   |  25  |  2.35[H]    Ca    10.0      22 Mar 2025 05:13  Phos  3.9     03-21  Mg     2.6     03-21        Urinalysis Basic - ( 22 Mar 2025 05:13 )    Color: x / Appearance: x / SG: x / pH: x  Gluc: 403 mg/dL / Ketone: x  / Bili: x / Urobili: x   Blood: x / Protein: x / Nitrite: x   Leuk Esterase: x / RBC: x / WBC x   Sq Epi: x / Non Sq Epi: x / Bacteria: x        Physical Exam:  No apparent distress, alert and oriented times three, appropriate affect  Clear to auscultation with no wheezing, ronchi or crackles  Regular rate and rhythm with no murmur, rub or gallop  Positive bowel sounds, soft, non-tender, non-distended, no masses/guarding or rebound tenderness  Right groin w/o bleeding or hematoma.  soft, nont tender.  Pulses in the right lower extremity are (palpable).   Denies chest pain, denies groin/leg/foot: pain, numbness or tingling         < from: Cardiac Catheterization (03.21.25 @ 14:37) >  Procedures Performed   Procedures:              1.    Venous Access - Right Femoral     2.    RHC   3.    CardioMemsImplant   4.    Ultrasound Guided Access     Indications:               Congestive heart failure, acute on chronic  systolic    Conclusions:   Successful implantation of CardioMEMS PA Sensor in L PA.     RHC:   RA 12 mmHg   RV 50/11 mmHg   PA 51/27/37 mmHg   PCWP 19 mmHg   CO/CI 5.6 L/min / 2.3 L/min/m2     NCT NUMBER: [id number]   CPT Code: 30191 Modifier: Q0   Z00.6     MODERATE SEDATION STATEMENT: Moderate sedation was administered and  monitored under the direct supervision of  TELLO PATTERSON MD from 2:37pm to 3:20pm.   Recommendations:     Return to [facility] for continued monitoring and access site management.      ASA 81mg + plavix 75mg for one month followed by ASA monotherapy  indefinitely.    Case results discussed with patient, his wife, and primary  cardiologist - Cameron.  Acute complication:    No complications     < end of copied text >    < from: TTE Limited W or WO Ultrasound Enhancing Agent (09.04.24 @ 17:26) >     CONCLUSIONS:      1. Left ventricular systolic function is severely decreased.   2. Trace pericardial effusion, trace pericardial effusion noted adjacent to the posterior left ventricle and trace pericardial effusion noted adjacent to the rightatrium with no evidence of hemodynamic compromise (or echocardiographic evidence of cardiac tamponade): no diastolic collapse of right atrium, exceding 1/3 of the cardiac cycle, no early diastolic inversion of the right ventricle and respiratory variation across the tricuspid valve E wave is not greater than 60%.   3. Compared to the transthoracic echocardiogram performed on 8/27/2024, there have been no significant interval changes.    < end of copied text >

## 2025-03-22 NOTE — PROGRESS NOTE ADULT - ASSESSMENT
64M h/o CHF with severely reduced LV function s/p AICD, CAD s/p PCI, DM2, L BKA, CKD3, COPD, GERD sent in to ED by PCP office for hypotension.       3/21 s/p RHC and Mems placement with Dr Gonzales via R groin ( perclose closure used)  RHC: RA 12 mmHg ,RV 50/11 mmHg, PA 51/27/37 mmHg, PCWP 19 mmHg , CO/CI 5.6 L/min / 2.3 L/min/m2   R groin procedural site wnl  -cont ASA 81mg + Plavix 75mg for one month,  followed by ASA monotherapy indefinitely.  -GMDMT as per primary team     - Reviewed and reinforced with patient:  wound care instructions, activities dos and dont's, medication compliance    - Patient aware to take DAPT  as prescribed and DO NOT STOP taking without consulting cardiologist first   - Reviewed and reinforced with patient:  site complications ( eg: bleeding, excruciating pain at the procedural site, large swelling ball size-  extremity numbness, tingling, temperature change), or CHEST PAIN; pt aware that if any of those occur he/she must call cardiologist IMMEDIATELY or 911 or go to nearest emergency room   - Reviewed and reinforced a heart healthy diet, Smoking Cessation  - Avoid using NSAIDs  (Aleve, Motrin, ibuprofen, naproxen) while on DAPT, please utilize Tylenol for pain control (not to exceed 4gm in 24 hours)  - Patient verbalizes understanding of ALL OF THE ABOVE, and gives positive feedback     - all other care as per primary team, cards Lyudmila San NP  invasive cardiology

## 2025-03-22 NOTE — PROGRESS NOTE ADULT - SUBJECTIVE AND OBJECTIVE BOX
[facility]   [phone]  RENAL FOLLOW UP NOTE  --------------------------------------------------------------------------------  Chief Complaint:    24 hour events/subjective:    seen earlier   states breathing well   states no dizziness     PAST HISTORY  --------------------------------------------------------------------------------  No significant changes to PMH, PSH, FHx, SHx, unless otherwise noted    ALLERGIES & MEDICATIONS  --------------------------------------------------------------------------------  Allergies    doxepin (Other)  Zosyn (Pruritus)  ceftriaxone (Rash)  vancomycin (Rash (Mild to Mod))  penicillins (Short breath; Rash)    Intolerances      Standing Inpatient Medications  albuterol/ipratropium for Nebulization 3 milliLiter(s) Nebulizer every 8 hours  aspirin enteric coated 81 milliGRAM(s) Oral daily  buMETAnide 2 milliGRAM(s) Oral two times a day  clopidogrel Tablet 75 milliGRAM(s) Oral daily  dapagliflozin 10 milliGRAM(s) Oral daily  dextrose 5%. 1000 milliLiter(s) IV Continuous <Continuous>  dextrose 50% Injectable 25 Gram(s) IV Push once  dextrose 50% Injectable 25 Gram(s) IV Push once  empagliflozin Tablet 10 milliGRAM(s) Oral daily  fluticasone propionate/ salmeterol 250-50 MICROgram(s) Diskus 1 Dose(s) Inhalation two times a day  glucagon  Injectable 1 milliGRAM(s) IntraMuscular once  insulin glargine Injectable (LANTUS) 20 Unit(s) SubCutaneous at bedtime  insulin lispro (ADMELOG) corrective regimen sliding scale   SubCutaneous at bedtime  insulin lispro (ADMELOG) corrective regimen sliding scale   SubCutaneous three times a day before meals  insulin lispro Injectable (ADMELOG) 8 Unit(s) SubCutaneous before breakfast  insulin lispro Injectable (ADMELOG) 8 Unit(s) SubCutaneous before lunch  insulin lispro Injectable (ADMELOG) 8 Unit(s) SubCutaneous before dinner  metoprolol succinate ER 25 milliGRAM(s) Oral daily  pantoprazole    Tablet 40 milliGRAM(s) Oral before breakfast  rosuvastatin 10 milliGRAM(s) Oral at bedtime  sacubitril 24 mG/valsartan 26 mG 1 Tablet(s) Oral two times a day  spironolactone 25 milliGRAM(s) Oral daily  tamsulosin 0.4 milliGRAM(s) Oral at bedtime    PRN Inpatient Medications  acetaminophen     Tablet .. 650 milliGRAM(s) Oral every 6 hours PRN  dextrose Oral Gel 15 Gram(s) Oral once PRN  hydrocodone/homatropine Syrup 5 milliLiter(s) Oral every 6 hours PRN  melatonin 3 milliGRAM(s) Oral at bedtime PRN      REVIEW OF SYSTEMS  --------------------------------------------------------------------------------    Gen: denies  fevers/chills, or HA or dizziness   CVS: denies chest pain/palpitations  Resp: denies SOB/Cough  GI: Denies N/V/Abd pain  : Denies dysuria or decrease urination     VITALS/PHYSICAL EXAM  --------------------------------------------------------------------------------  T(C): 36.7 (03-22-25 @ 20:30), Max: 36.7 (03-22-25 @ 20:30)  HR: 90 (03-22-25 @ 20:30) (86 - 97)  BP: 98/62 (03-22-25 @ 20:30) (96/60 - 113/73)  RR: 17 (03-22-25 @ 20:30) (17 - 18)  SpO2: 96% (03-22-25 @ 20:30) (93% - 98%)  Wt(kg): --  Height (cm): 195.6 (03-21-25 @ 13:50)  Weight (kg): 108.9 (03-21-25 @ 13:50)  BMI (kg/m2): 28.5 (03-21-25 @ 13:50)  BSA (m2): 2.42 (03-21-25 @ 13:50)      03-21-25 @ 07:01  -  03-22-25 @ 07:00  --------------------------------------------------------  IN: 480 mL / OUT: 3200 mL / NET: -2720 mL    03-22-25 @ 07:01  -  03-22-25 @ 22:11  --------------------------------------------------------  IN: 480 mL / OUT: 1000 mL / NET: -520 mL      Physical Exam:  	      	Gen: Non toxic comfortable appearing   	Pulm: decrease bs  no rales or ronchi or wheezing  	CV: No JVD. RRR, S1S2; no rub  	Abd: +BS, soft, nontender/nondistended  	: No suprapubic tenderness  	UE: Warm, no cyanosis  no clubbing,  no edema; no asterixis  	LE: Warm, no cyanosis  no clubbing, no edema L BKA   	Neuro: alert and oriented. speech coherent       LABS/STUDIES  --------------------------------------------------------------------------------              13.1   7.64  >-----------<  187      [03-21-25 @ 05:05]              42.9     133  |  90  |  103  ----------------------------<  403      [03-22-25 @ 05:13]  4.5   |  25  |  2.35        Ca     10.0     [03-22-25 @ 05:13]      Mg     2.6     [03-21-25 @ 05:05]      Phos  3.9     [03-21-25 @ 05:05]            Creatinine Trend:  SCr 2.35 [03-22 @ 05:13]  SCr 2.94 [03-21 @ 05:05]  SCr 3.23 [03-20 @ 17:30]  SCr 3.76 [03-20 @ 04:46]  SCr 2.93 [03-19 @ 15:03]        Urine Creatinine 85      [03-20-25 @ 07:19]  Urine Protein 13      [03-20-25 @ 07:19]  Urine Sodium 64      [03-20-25 @ 07:19]  Urine Urea Nitrogen 463      [03-20-25 @ 07:19]    Iron 24, TIBC 395, %sat 6      [08-29-24 @ 06:04]  Ferritin 51      [08-29-24 @ 07:31]  Lipid: chol 142, , HDL 27, LDL --      [03-20-25 @ 04:46]

## 2025-03-23 VITALS — DIASTOLIC BLOOD PRESSURE: 67 MMHG | SYSTOLIC BLOOD PRESSURE: 102 MMHG | HEART RATE: 96 BPM

## 2025-03-23 LAB
ANION GAP SERPL CALC-SCNC: 19 MMOL/L — HIGH (ref 5–17)
BUN SERPL-MCNC: 102 MG/DL — HIGH (ref 7–23)
CALCIUM SERPL-MCNC: 10.1 MG/DL — SIGNIFICANT CHANGE UP (ref 8.4–10.5)
CHLORIDE SERPL-SCNC: 90 MMOL/L — LOW (ref 96–108)
CO2 SERPL-SCNC: 26 MMOL/L — SIGNIFICANT CHANGE UP (ref 22–31)
CREAT SERPL-MCNC: 2.28 MG/DL — HIGH (ref 0.5–1.3)
EGFR: 32 ML/MIN/1.73M2 — LOW
EGFR: 32 ML/MIN/1.73M2 — LOW
GLUCOSE BLDC GLUCOMTR-MCNC: 133 MG/DL — HIGH (ref 70–99)
GLUCOSE BLDC GLUCOMTR-MCNC: 227 MG/DL — HIGH (ref 70–99)
GLUCOSE SERPL-MCNC: 87 MG/DL — SIGNIFICANT CHANGE UP (ref 70–99)
POTASSIUM SERPL-MCNC: 4.1 MMOL/L — SIGNIFICANT CHANGE UP (ref 3.5–5.3)
POTASSIUM SERPL-SCNC: 4.1 MMOL/L — SIGNIFICANT CHANGE UP (ref 3.5–5.3)
SODIUM SERPL-SCNC: 135 MMOL/L — SIGNIFICANT CHANGE UP (ref 135–145)

## 2025-03-23 PROCEDURE — 85018 HEMOGLOBIN: CPT

## 2025-03-23 PROCEDURE — C1760: CPT

## 2025-03-23 PROCEDURE — 85730 THROMBOPLASTIN TIME PARTIAL: CPT

## 2025-03-23 PROCEDURE — 84132 ASSAY OF SERUM POTASSIUM: CPT

## 2025-03-23 PROCEDURE — 85027 COMPLETE CBC AUTOMATED: CPT

## 2025-03-23 PROCEDURE — P9045: CPT

## 2025-03-23 PROCEDURE — 94640 AIRWAY INHALATION TREATMENT: CPT

## 2025-03-23 PROCEDURE — 80053 COMPREHEN METABOLIC PANEL: CPT

## 2025-03-23 PROCEDURE — 83036 HEMOGLOBIN GLYCOSYLATED A1C: CPT

## 2025-03-23 PROCEDURE — 84100 ASSAY OF PHOSPHORUS: CPT

## 2025-03-23 PROCEDURE — C1894: CPT

## 2025-03-23 PROCEDURE — 93308 TTE F-UP OR LMTD: CPT

## 2025-03-23 PROCEDURE — 82803 BLOOD GASES ANY COMBINATION: CPT

## 2025-03-23 PROCEDURE — 82570 ASSAY OF URINE CREATININE: CPT

## 2025-03-23 PROCEDURE — 87637 SARSCOV2&INF A&B&RSV AMP PRB: CPT

## 2025-03-23 PROCEDURE — 83605 ASSAY OF LACTIC ACID: CPT

## 2025-03-23 PROCEDURE — 85025 COMPLETE CBC W/AUTO DIFF WBC: CPT

## 2025-03-23 PROCEDURE — 81003 URINALYSIS AUTO W/O SCOPE: CPT

## 2025-03-23 PROCEDURE — 99285 EMERGENCY DEPT VISIT HI MDM: CPT

## 2025-03-23 PROCEDURE — 83735 ASSAY OF MAGNESIUM: CPT

## 2025-03-23 PROCEDURE — 82962 GLUCOSE BLOOD TEST: CPT

## 2025-03-23 PROCEDURE — 82435 ASSAY OF BLOOD CHLORIDE: CPT

## 2025-03-23 PROCEDURE — 84484 ASSAY OF TROPONIN QUANT: CPT

## 2025-03-23 PROCEDURE — C1887: CPT

## 2025-03-23 PROCEDURE — 82330 ASSAY OF CALCIUM: CPT

## 2025-03-23 PROCEDURE — C1769: CPT

## 2025-03-23 PROCEDURE — 82947 ASSAY GLUCOSE BLOOD QUANT: CPT

## 2025-03-23 PROCEDURE — 93005 ELECTROCARDIOGRAM TRACING: CPT

## 2025-03-23 PROCEDURE — 84540 ASSAY OF URINE/UREA-N: CPT

## 2025-03-23 PROCEDURE — 85610 PROTHROMBIN TIME: CPT

## 2025-03-23 PROCEDURE — 84295 ASSAY OF SERUM SODIUM: CPT

## 2025-03-23 PROCEDURE — 84300 ASSAY OF URINE SODIUM: CPT

## 2025-03-23 PROCEDURE — 71045 X-RAY EXAM CHEST 1 VIEW: CPT

## 2025-03-23 PROCEDURE — 84156 ASSAY OF PROTEIN URINE: CPT

## 2025-03-23 PROCEDURE — 85014 HEMATOCRIT: CPT

## 2025-03-23 PROCEDURE — 80061 LIPID PANEL: CPT

## 2025-03-23 PROCEDURE — 33289 TCAT IMPL WRLS P-ART PRS SNR: CPT

## 2025-03-23 PROCEDURE — C2624: CPT

## 2025-03-23 PROCEDURE — 80048 BASIC METABOLIC PNL TOTAL CA: CPT

## 2025-03-23 RX ORDER — DAPAGLIFLOZIN 5 MG/1
1 TABLET, FILM COATED ORAL
Qty: 30 | Refills: 0
Start: 2025-03-23 | End: 2025-04-21

## 2025-03-23 RX ADMIN — INSULIN LISPRO 8 UNIT(S): 100 INJECTION, SOLUTION INTRAVENOUS; SUBCUTANEOUS at 12:22

## 2025-03-23 RX ADMIN — INSULIN LISPRO 8 UNIT(S): 100 INJECTION, SOLUTION INTRAVENOUS; SUBCUTANEOUS at 07:54

## 2025-03-23 RX ADMIN — INSULIN LISPRO 2: 100 INJECTION, SOLUTION INTRAVENOUS; SUBCUTANEOUS at 12:22

## 2025-03-23 RX ADMIN — Medication 1 DOSE(S): at 07:54

## 2025-03-23 RX ADMIN — SACUBITRIL AND VALSARTAN 1 TABLET(S): 6; 6 PELLET ORAL at 07:54

## 2025-03-23 RX ADMIN — Medication 81 MILLIGRAM(S): at 12:21

## 2025-03-23 RX ADMIN — CLOPIDOGREL BISULFATE 75 MILLIGRAM(S): 75 TABLET, FILM COATED ORAL at 12:21

## 2025-03-23 RX ADMIN — DAPAGLIFLOZIN 10 MILLIGRAM(S): 5 TABLET, FILM COATED ORAL at 12:23

## 2025-03-23 RX ADMIN — BUMETANIDE 2 MILLIGRAM(S): 1 TABLET ORAL at 06:01

## 2025-03-23 RX ADMIN — METOPROLOL SUCCINATE 25 MILLIGRAM(S): 50 TABLET, EXTENDED RELEASE ORAL at 12:20

## 2025-03-23 RX ADMIN — Medication 40 MILLIGRAM(S): at 06:02

## 2025-03-23 RX ADMIN — Medication 25 MILLIGRAM(S): at 06:02

## 2025-03-23 NOTE — PROGRESS NOTE ADULT - SUBJECTIVE AND OBJECTIVE BOX
[facility]   [phone]  RENAL FOLLOW UP NOTE  --------------------------------------------------------------------------------  Chief Complaint:    24 hour events/subjective:    seen earlier states breathing is better     PAST HISTORY  --------------------------------------------------------------------------------  No significant changes to PMH, PSH, FHx, SHx, unless otherwise noted    ALLERGIES & MEDICATIONS  --------------------------------------------------------------------------------  Allergies    doxepin (Other)  Zosyn (Pruritus)  ceftriaxone (Rash)  vancomycin (Rash (Mild to Mod))  penicillins (Short breath; Rash)    Intolerances      Standing Inpatient Medications  albuterol/ipratropium for Nebulization 3 milliLiter(s) Nebulizer every 8 hours  aspirin enteric coated 81 milliGRAM(s) Oral daily  buMETAnide 2 milliGRAM(s) Oral two times a day  clopidogrel Tablet 75 milliGRAM(s) Oral daily  dapagliflozin 10 milliGRAM(s) Oral daily  dextrose 5%. 1000 milliLiter(s) IV Continuous <Continuous>  dextrose 50% Injectable 25 Gram(s) IV Push once  dextrose 50% Injectable 25 Gram(s) IV Push once  fluticasone propionate/ salmeterol 250-50 MICROgram(s) Diskus 1 Dose(s) Inhalation two times a day  glucagon  Injectable 1 milliGRAM(s) IntraMuscular once  insulin glargine Injectable (LANTUS) 20 Unit(s) SubCutaneous at bedtime  insulin lispro (ADMELOG) corrective regimen sliding scale   SubCutaneous at bedtime  insulin lispro (ADMELOG) corrective regimen sliding scale   SubCutaneous three times a day before meals  insulin lispro Injectable (ADMELOG) 8 Unit(s) SubCutaneous before breakfast  insulin lispro Injectable (ADMELOG) 8 Unit(s) SubCutaneous before lunch  insulin lispro Injectable (ADMELOG) 8 Unit(s) SubCutaneous before dinner  metoprolol succinate ER 25 milliGRAM(s) Oral daily  pantoprazole    Tablet 40 milliGRAM(s) Oral before breakfast  rosuvastatin 10 milliGRAM(s) Oral at bedtime  sacubitril 24 mG/valsartan 26 mG 1 Tablet(s) Oral two times a day  spironolactone 25 milliGRAM(s) Oral daily  tamsulosin 0.4 milliGRAM(s) Oral at bedtime    PRN Inpatient Medications  acetaminophen     Tablet .. 650 milliGRAM(s) Oral every 6 hours PRN  dextrose Oral Gel 15 Gram(s) Oral once PRN  hydrocodone/homatropine Syrup 5 milliLiter(s) Oral every 6 hours PRN  melatonin 3 milliGRAM(s) Oral at bedtime PRN      REVIEW OF SYSTEMS  --------------------------------------------------------------------------------    Gen: denies  fevers/chills, or HA or dizziness  runny nose or sorethroat  CVS: denies chest pain/palpitations  Resp: denies SOB/Cough  GI: Denies N/V/Abd pain  : Denies dysuria/oliguria/hematuria        VITALS/PHYSICAL EXAM  --------------------------------------------------------------------------------  T(C): 36.6 (03-23-25 @ 11:04), Max: 36.8 (03-23-25 @ 04:40)  HR: 96 (03-23-25 @ 12:18) (82 - 98)  BP: 102/67 (03-23-25 @ 12:18) (95/63 - 110/67)  RR: 18 (03-23-25 @ 11:04) (18 - 18)  SpO2: 98% (03-23-25 @ 11:04) (97% - 98%)  Wt(kg): --        03-22-25 @ 07:01  -  03-23-25 @ 07:00  --------------------------------------------------------  IN: 480 mL / OUT: 1900 mL / NET: -1420 mL    03-23-25 @ 07:01  -  03-23-25 @ 20:40  --------------------------------------------------------  IN: 480 mL / OUT: 1150 mL / NET: -670 mL      Physical Exam:  	    	Gen: Non toxic comfortable appearing   	Pulm: decrease bs  no rales or ronchi or wheezing  	CV: No JVD. RRR, S1S2; no rub  	Abd: +BS, soft, nontender/nondistended  	: No suprapubic tenderness  	UE: Warm, no cyanosis  no clubbing,  no edema; no asterixis  	LE: Warm, no cyanosis  no clubbing, no edema L BKA   	Neuro: alert and oriented. speech coherent         LABS/STUDIES  --------------------------------------------------------------------------------    135  |  90  |  102  ----------------------------<  87      [03-23-25 @ 06:17]  4.1   |  26  |  2.28        Ca     10.1     [03-23-25 @ 06:17]            Creatinine Trend:  SCr 2.28 [03-23 @ 06:17]  SCr 2.35 [03-22 @ 05:13]  SCr 2.94 [03-21 @ 05:05]  SCr 3.23 [03-20 @ 17:30]  SCr 3.76 [03-20 @ 04:46]        Urine Creatinine 85      [03-20-25 @ 07:19]  Urine Protein 13      [03-20-25 @ 07:19]  Urine Sodium 64      [03-20-25 @ 07:19]  Urine Urea Nitrogen 463      [03-20-25 @ 07:19]    Iron 24, TIBC 395, %sat 6      [08-29-24 @ 06:04]  Ferritin 51      [08-29-24 @ 07:31]  Lipid: chol 142, , HDL 27, LDL --      [03-20-25 @ 04:46]

## 2025-03-23 NOTE — PROGRESS NOTE ADULT - REASON FOR ADMISSION
sent in from PCP office for hypotension

## 2025-03-23 NOTE — CHART NOTE - NSCHARTNOTEFT_GEN_A_CORE
Medically cleared for discharge by Dr. Barnes and Dr. Sow today. Pt BP improved, Creat 2.28 ( from 3.35), diarrhea overnight resolved. Reviewed cardiac medications with Dr. Barnes, continue current regimen. No metalazone. Resume home Jardiance. no farxiga.   Pt with noted a1c 10.4 on 3/20/25, advised to follow up with PMD Dr. Boyikn who manages diabetes as tighter control needed. Pt agrees. Plan for discharge home with home care as CHF.

## 2025-03-23 NOTE — PROGRESS NOTE ADULT - PROVIDER SPECIALTY LIST ADULT
Cardiology
Cardiology
Nephrology
Nephrology
Hospitalist
Intervent Cardiology
Nephrology
Cardiology
Cardiology
Nephrology
Hospitalist
Hospitalist

## 2025-03-23 NOTE — PROGRESS NOTE ADULT - ASSESSMENT
64M h/o CHF with severely reduced LV function s/p AICD, CAD s/p PCI, DM2, L BKA, CKD3, COPD, GERD sent in to ED by PCP office for hypotension. the patient states he was in heart failure a few days ago and was started on metolazone 3 days ago by his pcp (metolazone filled 11 days ago). Wife states he was taking metolazone daily for about a week and lost about 40 lbs in that time. he has been taking all of his medications as prescribed. He developed a cough with shortness of breath about a week ago and was given a zpak which he finished. +sick contact- his grandchild has a viral infection. Overall, he feels slightly better but coughing has not improved. cough productive of greenish sputum at times though that has decreased. He denies any fever or chills. he has been taking his prn albuterol about 3x/day recently. currently, he denies any chest pain, sob, abdominal pain, change in appetite. he does c/o the persistent cough and feels he is unable to bring up sputum.   noticed with high creatinine      1- MANJIT   2- chf hx   3- hypotension on admission   4- cardiomyopathy     manjit in setting of pre renal azotemia due to diuretics and decrease perfusion due to hypotension   creatinine is improving   bp is now baseline   bumex 4 mg daily and entresto 24/26 mg bid   heme  for paraproteinemia   strict I/O  dw pt wife who joined over phone

## 2025-03-23 NOTE — PROGRESS NOTE ADULT - SUBJECTIVE AND OBJECTIVE BOX
DATE OF SERVICE: 03-23-25 @ 18:41    Patient is a 64y old  Male who presents with a chief complaint of sent in from PCP office for hypotension (22 Mar 2025 22:10)      INTERVAL HISTORY: feels well, BP tolerated restarting GDMT well     TELEMETRY Personally reviewed: no events  	  MEDICATIONS:  buMETAnide 2 milliGRAM(s) Oral two times a day  metoprolol succinate ER 25 milliGRAM(s) Oral daily  sacubitril 24 mG/valsartan 26 mG 1 Tablet(s) Oral two times a day  spironolactone 25 milliGRAM(s) Oral daily        PHYSICAL EXAM:  T(C): 36.6 (03-23-25 @ 11:04), Max: 36.8 (03-23-25 @ 04:40)  HR: 96 (03-23-25 @ 12:18) (82 - 98)  BP: 102/67 (03-23-25 @ 12:18) (95/63 - 110/67)  RR: 18 (03-23-25 @ 11:04) (17 - 18)  SpO2: 98% (03-23-25 @ 11:04) (96% - 98%)  Wt(kg): --  I&O's Summary    22 Mar 2025 07:01  -  23 Mar 2025 07:00  --------------------------------------------------------  IN: 480 mL / OUT: 1900 mL / NET: -1420 mL    23 Mar 2025 07:01  -  23 Mar 2025 18:41  --------------------------------------------------------  IN: 480 mL / OUT: 1150 mL / NET: -670 mL          Appearance: In no distress	  HEENT:    PERRL, EOMI	  Cardiovascular:  S1 S2, No JVD  Respiratory: Lungs clear to auscultation	  Gastrointestinal:  Soft, Non-tender, + BS	  Vascularature:  No edema of LE  Psychiatric: Appropriate affect   Neuro: no acute focal deficits           03-23    135  |  90[L]  |  102[H]  ----------------------------<  87  4.1   |  26  |  2.28[H]    Ca    10.1      23 Mar 2025 06:17          Labs personally reviewed      ASSESSMENT/PLAN:   	  64-year-old male patient past medical history of Heart Failure with reduced EF% 20 (most recent echo Sept 2024), essential HTN, CKD3, type 2 DM on high doses of bedtime and on fluctuating preprandial insulin (past episode of hypoglycemia), CAD with past PCI on ASA and Plavix, LEFT BKA in [facility] in May 2024, last discharge from [facility] Sept 2024 who was brought in via EMS for hypotension.  Patient was evaluated at his PCPs office when he was found systolic in the 80s.  The past 2 days the patient has been experiencing lethargy and fatigue. Denies any fevers, chest pain, shortness of breath, right leg edema.  Does admit to dry cough.  On exam, found mentating AAOx3 with BP 90s to 100 systolic.  Endorses normal appetite.  Prior to today patient has been compliant with his home medication and diuretics.  Hypertension possibly from over diuresis at home versus infectious etiology.      Problem/Plan - 1:  ·  Problem: Hypotension  ·  Plan:  p/w low BP i/s/o recent viral infection and started on Metolazone daily dosing 3 days PTA by PCP  - Suspect volume depletion 2/2 addition of Metolazone especially given MANJIT on CKD  - Hold sHF GDMT including diuretics  - s/p albumin administration  - 3/21 restart home dose Bumex s/p RHC  - Resume CV meds for GDMT as below and monitor BP     Problem/Plan - 2: Elevated Troponin   ·  Plan:  Patient denies CP or SOB  - Will review ECG  - Possibly i/s/o decreased clearance given MANJIT on CKD  - Recent NST as noted below with predominant infarct  - c/w ASA, Rosuvastatin    Problem/Plan - 3: Chronic systolic heart failure.   ·  Plan:    - TTE 9/2024 with known EF 20% unchanged from prior  - Hold below sHF GDMT given hypotension. Will likely have to modify diuretics for DC  --- Entresto 24/26mg BID   --- Aldactone 25mg as OP  --- Jardiance 10mg daily  --- Bumex 2mg PO BID  --- Will not retsart Metolazone  - As per wife, dry weight ~247lbs  - 3/19 restarted Toprol 25mg daily    - Plan for CardioMems 3/21  - RHC 3/21 - Ra 12, RV 50/11, PCWP 21, PA 51/27/37  - Restart home dose Bumex 2mg PO BID given mildly elevated PCWP  - Resumed Aldactone, Jardiance, Farxiga, Entresto with BP tolerating well and normotension     Problem/Plan - 4:  ·  Problem: CAD (coronary artery disease).   ·  Plan: c/w asa and statin  - Ordered Nuclear Stress test given CAD and WCT seen on tele  - NM stress test with large-sized, moderate to severe defect(s) in the mid to distal anterior, septal, apical and inferior walls that are predominantly fixed consistent with an infarction with minimal chantel-infarct ischemia.  - Given predominant infarct no role for cardiac cath    Problem/Plan - 5:  ·  Problem: MANJIT on CKD.   ·  Plan: Monitor BMP daily, dose meds per renal fx, avoid nephrotoxins.  - Hold Bumex, Jardiance, aldactone and Entresto given hypotension and MANJIT    Problem/Plan  6:  ·  Problem: ICD  ·  Plan:  s/p ICD extraction and re-implant, (9/3)        Shawn Barnes DO [facility]  Cardiovascular Medicine  [address], [address]  Office: [phone]  Available via Text/call on Microsoft Teams

## 2025-03-24 LAB
HGB FLD-MCNC: 13.4 G/DL — SIGNIFICANT CHANGE UP (ref 12.6–17.4)
OXYHGB MFR BLDMV: 58.3 % — SIGNIFICANT CHANGE UP
SAO2 % BLD: 59.8 % — LOW (ref 60–90)

## 2025-04-22 ENCOUNTER — APPOINTMENT (OUTPATIENT)
Dept: ELECTROPHYSIOLOGY | Facility: CLINIC | Age: 65
End: 2025-04-22
Payer: MEDICARE

## 2025-04-22 ENCOUNTER — NON-APPOINTMENT (OUTPATIENT)
Age: 65
End: 2025-04-22

## 2025-04-22 PROCEDURE — 93296 REM INTERROG EVL PM/IDS: CPT

## 2025-04-22 PROCEDURE — 93295 DEV INTERROG REMOTE 1/2/MLT: CPT

## 2025-06-30 ENCOUNTER — INPATIENT (INPATIENT)
Facility: HOSPITAL | Age: 65
LOS: 2 days | Discharge: HOME | DRG: 916 | End: 2025-07-03
Attending: INTERNAL MEDICINE | Admitting: INTERNAL MEDICINE
Payer: MEDICARE

## 2025-06-30 VITALS
HEIGHT: 77 IN | HEART RATE: 92 BPM | RESPIRATION RATE: 20 BRPM | OXYGEN SATURATION: 97 % | DIASTOLIC BLOOD PRESSURE: 58 MMHG | TEMPERATURE: 99 F | WEIGHT: 250 LBS | SYSTOLIC BLOOD PRESSURE: 87 MMHG

## 2025-06-30 DIAGNOSIS — Z98.52 VASECTOMY STATUS: Chronic | ICD-10-CM

## 2025-06-30 LAB
ALBUMIN SERPL ELPH-MCNC: 4 G/DL — SIGNIFICANT CHANGE UP (ref 3.3–5)
ALP SERPL-CCNC: 130 U/L — HIGH (ref 40–120)
ALT FLD-CCNC: 10 U/L — SIGNIFICANT CHANGE UP (ref 10–45)
ANION GAP SERPL CALC-SCNC: 17 MMOL/L — SIGNIFICANT CHANGE UP (ref 5–17)
APTT BLD: 31.6 SEC — SIGNIFICANT CHANGE UP (ref 26.1–36.8)
AST SERPL-CCNC: 14 U/L — SIGNIFICANT CHANGE UP (ref 10–40)
BASOPHILS # BLD AUTO: 0.03 K/UL — SIGNIFICANT CHANGE UP (ref 0–0.2)
BASOPHILS NFR BLD AUTO: 0.2 % — SIGNIFICANT CHANGE UP (ref 0–2)
BILIRUB SERPL-MCNC: 0.9 MG/DL — SIGNIFICANT CHANGE UP (ref 0.2–1.2)
BUN SERPL-MCNC: 44 MG/DL — HIGH (ref 7–23)
CALCIUM SERPL-MCNC: 9.2 MG/DL — SIGNIFICANT CHANGE UP (ref 8.4–10.5)
CHLORIDE SERPL-SCNC: 98 MMOL/L — SIGNIFICANT CHANGE UP (ref 96–108)
CO2 SERPL-SCNC: 25 MMOL/L — SIGNIFICANT CHANGE UP (ref 22–31)
CREAT SERPL-MCNC: 2.26 MG/DL — HIGH (ref 0.5–1.3)
EGFR: 32 ML/MIN/1.73M2 — LOW
EGFR: 32 ML/MIN/1.73M2 — LOW
EOSINOPHIL # BLD AUTO: 0.52 K/UL — HIGH (ref 0–0.5)
EOSINOPHIL NFR BLD AUTO: 4.2 % — SIGNIFICANT CHANGE UP (ref 0–6)
GAS PNL BLDV: SIGNIFICANT CHANGE UP
GLUCOSE BLDC GLUCOMTR-MCNC: 443 MG/DL — HIGH (ref 70–99)
GLUCOSE SERPL-MCNC: 65 MG/DL — LOW (ref 70–99)
HCT VFR BLD CALC: 43.6 % — SIGNIFICANT CHANGE UP (ref 39–50)
HGB BLD-MCNC: 13.1 G/DL — SIGNIFICANT CHANGE UP (ref 13–17)
IMM GRANULOCYTES # BLD AUTO: 0.12 K/UL — HIGH (ref 0–0.07)
IMM GRANULOCYTES NFR BLD AUTO: 1 % — HIGH (ref 0–0.9)
INR BLD: 1.16 RATIO — SIGNIFICANT CHANGE UP (ref 0.85–1.16)
LYMPHOCYTES # BLD AUTO: 0.66 K/UL — LOW (ref 1–3.3)
LYMPHOCYTES NFR BLD AUTO: 5.3 % — LOW (ref 13–44)
MCHC RBC-ENTMCNC: 22.8 PG — LOW (ref 27–34)
MCHC RBC-ENTMCNC: 30 G/DL — LOW (ref 32–36)
MCV RBC AUTO: 76 FL — LOW (ref 80–100)
MONOCYTES # BLD AUTO: 0.73 K/UL — SIGNIFICANT CHANGE UP (ref 0–0.9)
MONOCYTES NFR BLD AUTO: 5.9 % — SIGNIFICANT CHANGE UP (ref 2–14)
NEUTROPHILS # BLD AUTO: 10.41 K/UL — HIGH (ref 1.8–7.4)
NEUTROPHILS NFR BLD AUTO: 83.4 % — HIGH (ref 43–77)
NRBC # BLD AUTO: 0 K/UL — SIGNIFICANT CHANGE UP (ref 0–0)
NRBC # FLD: 0 K/UL — SIGNIFICANT CHANGE UP (ref 0–0)
NRBC BLD AUTO-RTO: 0 /100 WBCS — SIGNIFICANT CHANGE UP (ref 0–0)
NT-PROBNP SERPL-SCNC: 3815 PG/ML — HIGH (ref 0–300)
PLATELET # BLD AUTO: 176 K/UL — SIGNIFICANT CHANGE UP (ref 150–400)
PMV BLD: 9.8 FL — SIGNIFICANT CHANGE UP (ref 7–13)
POTASSIUM SERPL-MCNC: 3.3 MMOL/L — LOW (ref 3.5–5.3)
POTASSIUM SERPL-SCNC: 3.3 MMOL/L — LOW (ref 3.5–5.3)
PROT SERPL-MCNC: 7.8 G/DL — SIGNIFICANT CHANGE UP (ref 6–8.3)
PROTHROM AB SERPL-ACNC: 13.2 SEC — SIGNIFICANT CHANGE UP (ref 9.9–13.4)
RBC # BLD: 5.74 M/UL — SIGNIFICANT CHANGE UP (ref 4.2–5.8)
RBC # FLD: 17.3 % — HIGH (ref 10.3–14.5)
SODIUM SERPL-SCNC: 140 MMOL/L — SIGNIFICANT CHANGE UP (ref 135–145)
WBC # BLD: 12.47 K/UL — HIGH (ref 3.8–10.5)
WBC # FLD AUTO: 12.47 K/UL — HIGH (ref 3.8–10.5)

## 2025-06-30 PROCEDURE — 99223 1ST HOSP IP/OBS HIGH 75: CPT | Mod: FS

## 2025-06-30 PROCEDURE — 93010 ELECTROCARDIOGRAM REPORT: CPT

## 2025-06-30 RX ORDER — SODIUM CHLORIDE 9 G/1000ML
1000 INJECTION, SOLUTION INTRAVENOUS
Refills: 0 | Status: DISCONTINUED | OUTPATIENT
Start: 2025-06-30 | End: 2025-07-03

## 2025-06-30 RX ORDER — METHYLPREDNISOLONE ACETATE 80 MG/ML
125 INJECTION, SUSPENSION INTRA-ARTICULAR; INTRALESIONAL; INTRAMUSCULAR; SOFT TISSUE ONCE
Refills: 0 | Status: COMPLETED | OUTPATIENT
Start: 2025-06-30 | End: 2025-06-30

## 2025-06-30 RX ORDER — DEXTROSE 50 % IN WATER 50 %
25 SYRINGE (ML) INTRAVENOUS ONCE
Refills: 0 | Status: DISCONTINUED | OUTPATIENT
Start: 2025-06-30 | End: 2025-07-03

## 2025-06-30 RX ORDER — DOXYCYCLINE HYCLATE 100 MG
100 TABLET ORAL ONCE
Refills: 0 | Status: COMPLETED | OUTPATIENT
Start: 2025-06-30 | End: 2025-06-30

## 2025-06-30 RX ORDER — BUMETANIDE 1 MG/1
4 TABLET ORAL DAILY
Refills: 0 | Status: DISCONTINUED | OUTPATIENT
Start: 2025-06-30 | End: 2025-07-03

## 2025-06-30 RX ORDER — SACUBITRIL AND VALSARTAN 6; 6 MG/1; MG/1
1 PELLET ORAL
Refills: 0 | Status: DISCONTINUED | OUTPATIENT
Start: 2025-06-30 | End: 2025-07-03

## 2025-06-30 RX ORDER — INSULIN LISPRO 100 U/ML
10 INJECTION, SOLUTION INTRAVENOUS; SUBCUTANEOUS ONCE
Refills: 0 | Status: COMPLETED | OUTPATIENT
Start: 2025-06-30 | End: 2025-06-30

## 2025-06-30 RX ORDER — INSULIN LISPRO 100 U/ML
INJECTION, SOLUTION INTRAVENOUS; SUBCUTANEOUS AT BEDTIME
Refills: 0 | Status: DISCONTINUED | OUTPATIENT
Start: 2025-06-30 | End: 2025-07-03

## 2025-06-30 RX ORDER — GLUCAGON 3 MG/1
1 POWDER NASAL ONCE
Refills: 0 | Status: DISCONTINUED | OUTPATIENT
Start: 2025-06-30 | End: 2025-07-03

## 2025-06-30 RX ORDER — ROSUVASTATIN CALCIUM 5 MG/1
20 TABLET, FILM COATED ORAL AT BEDTIME
Refills: 0 | Status: DISCONTINUED | OUTPATIENT
Start: 2025-06-30 | End: 2025-07-03

## 2025-06-30 RX ORDER — PREDNISONE 20 MG/1
40 TABLET ORAL DAILY
Refills: 0 | Status: DISCONTINUED | OUTPATIENT
Start: 2025-06-30 | End: 2025-07-01

## 2025-06-30 RX ORDER — METOPROLOL SUCCINATE 50 MG/1
25 TABLET, EXTENDED RELEASE ORAL DAILY
Refills: 0 | Status: DISCONTINUED | OUTPATIENT
Start: 2025-06-30 | End: 2025-07-03

## 2025-06-30 RX ORDER — INSULIN LISPRO 100 U/ML
8 INJECTION, SOLUTION INTRAVENOUS; SUBCUTANEOUS
Refills: 0 | Status: DISCONTINUED | OUTPATIENT
Start: 2025-06-30 | End: 2025-07-02

## 2025-06-30 RX ORDER — ASPIRIN 325 MG
81 TABLET ORAL DAILY
Refills: 0 | Status: DISCONTINUED | OUTPATIENT
Start: 2025-06-30 | End: 2025-07-03

## 2025-06-30 RX ORDER — DIPHENHYDRAMINE HCL 12.5MG/5ML
25 ELIXIR ORAL ONCE
Refills: 0 | Status: COMPLETED | OUTPATIENT
Start: 2025-06-30 | End: 2025-06-30

## 2025-06-30 RX ORDER — CLOPIDOGREL BISULFATE 75 MG/1
75 TABLET, FILM COATED ORAL DAILY
Refills: 0 | Status: DISCONTINUED | OUTPATIENT
Start: 2025-06-30 | End: 2025-07-03

## 2025-06-30 RX ORDER — INSULIN GLARGINE-YFGN 100 [IU]/ML
40 INJECTION, SOLUTION SUBCUTANEOUS AT BEDTIME
Refills: 0 | Status: DISCONTINUED | OUTPATIENT
Start: 2025-06-30 | End: 2025-07-03

## 2025-06-30 RX ORDER — HYDROXYZINE HYDROCHLORIDE 25 MG/1
25 TABLET, FILM COATED ORAL ONCE
Refills: 0 | Status: COMPLETED | OUTPATIENT
Start: 2025-06-30 | End: 2025-06-30

## 2025-06-30 RX ORDER — DOXYCYCLINE HYCLATE 100 MG
100 TABLET ORAL EVERY 12 HOURS
Refills: 0 | Status: DISCONTINUED | OUTPATIENT
Start: 2025-06-30 | End: 2025-07-01

## 2025-06-30 RX ORDER — DEXTROSE 50 % IN WATER 50 %
12.5 SYRINGE (ML) INTRAVENOUS ONCE
Refills: 0 | Status: DISCONTINUED | OUTPATIENT
Start: 2025-06-30 | End: 2025-07-03

## 2025-06-30 RX ORDER — TAMSULOSIN HYDROCHLORIDE 0.4 MG/1
0.4 CAPSULE ORAL AT BEDTIME
Refills: 0 | Status: DISCONTINUED | OUTPATIENT
Start: 2025-06-30 | End: 2025-07-03

## 2025-06-30 RX ORDER — DEXTROSE 50 % IN WATER 50 %
15 SYRINGE (ML) INTRAVENOUS ONCE
Refills: 0 | Status: DISCONTINUED | OUTPATIENT
Start: 2025-06-30 | End: 2025-07-03

## 2025-06-30 RX ADMIN — METHYLPREDNISOLONE ACETATE 125 MILLIGRAM(S): 80 INJECTION, SUSPENSION INTRA-ARTICULAR; INTRALESIONAL; INTRAMUSCULAR; SOFT TISSUE at 14:38

## 2025-06-30 RX ADMIN — METOPROLOL SUCCINATE 25 MILLIGRAM(S): 50 TABLET, EXTENDED RELEASE ORAL at 22:38

## 2025-06-30 RX ADMIN — INSULIN LISPRO 10 UNIT(S): 100 INJECTION, SOLUTION INTRAVENOUS; SUBCUTANEOUS at 22:29

## 2025-06-30 RX ADMIN — TAMSULOSIN HYDROCHLORIDE 0.4 MILLIGRAM(S): 0.4 CAPSULE ORAL at 21:19

## 2025-06-30 RX ADMIN — SACUBITRIL AND VALSARTAN 1 TABLET(S): 6; 6 PELLET ORAL at 18:25

## 2025-06-30 RX ADMIN — Medication 100 MILLIGRAM(S): at 15:07

## 2025-06-30 RX ADMIN — HYDROXYZINE HYDROCHLORIDE 25 MILLIGRAM(S): 25 TABLET, FILM COATED ORAL at 21:54

## 2025-06-30 RX ADMIN — ROSUVASTATIN CALCIUM 20 MILLIGRAM(S): 5 TABLET, FILM COATED ORAL at 21:19

## 2025-06-30 RX ADMIN — Medication 200 MILLILITER(S): at 14:43

## 2025-06-30 RX ADMIN — INSULIN GLARGINE-YFGN 40 UNIT(S): 100 INJECTION, SOLUTION SUBCUTANEOUS at 21:55

## 2025-06-30 RX ADMIN — Medication 25 MILLIGRAM(S): at 14:38

## 2025-06-30 NOTE — ED CDU PROVIDER INITIAL DAY NOTE - PROGRESS NOTE DETAILS
Bedtime FS elevated 443. Pt reported eating dinner, but was not given Insulin coverage. Pt without complaints currently, Will give 10 units of Admelog subcut and reassess. CDU PROGRESS NOTE JOE DARBY: Received pt at 1900 sign-out. Case/plan reviewed. Pt resting in stretcher in NAD. VS notable for soft BP, otherwise stable. Pt Well appearing, AAO x 3, Ext: S/p L BKA. +RLE w/ 1 cm scabbed over wound to distal anterior tibia with surrounding erythema extending up leg to medial thigh. +chronic vascular skin changes noted to R shin. Pt c/o generalized itching, no visible hives on exam. Will give Atarax 25mg po and closely monitor.

## 2025-06-30 NOTE — ED ADULT NURSE REASSESSMENT NOTE - NS ED NURSE REASSESS COMMENT FT1
Ethan DIAZ made aware of fs bs of 429 and that pt states he ate Doritos less than 2 hours ago as  snack; pt instructed not to eat any more snacks tonight; explained bs is high.

## 2025-06-30 NOTE — ED CDU PROVIDER INITIAL DAY NOTE - OBJECTIVE STATEMENT
see hpi in provider note see hpi in provider note    In the emergency department patient was found to have mild leukocytosis of 12, mild hypokalemia 3.3.  Kidney function roughly at baseline.  Elevated procalcitonin 0.41.  Elevated proBNP 3800 not clinically fluid overloaded.  X-ray of the chest showed small right pleural effusion with associated atelectasis x-rays demonstrated chronic appearing ossific body adjacent to the distal right fibular, patient not tender at this point patient was started on IV doxycycline, went to CDU for further management of right lower extremity cellulitis.

## 2025-06-30 NOTE — ED PROVIDER NOTE - PHYSICAL EXAMINATION
Gen: Well appearing, AAO x 3, NAD  HEENT: NC/AT, PERRLA, EOMI, MMM. Clear/visible posterior oropharynx. Uvula midline. No tongue swelling or elevation. No hoarseness.   Resp: unlabored CTAB  Cardiac: rrr s1s2, no murmurs, rubs or gallops  GI: ND, +BS, Soft, NT  Ext: S/p L BKA. +erythema to all extremities, no visible hives. RLE w/ 1 cm scabbed over wound to distal anterior tibia with surrounding erythema extending up leg to medial thigh. +chronic vascular skin changes noted to R shin.   Neuro: no focal deficits   Skin: Skin warm. Total body erythema noted without visible hives.  RLE with 1 cm scabbed over wound to distal anterior tibia with surrounding erythema extending up leg to medial thigh.

## 2025-06-30 NOTE — ED CDU PROVIDER DISPOSITION NOTE - NS ED MD DISPO SPECIAL CONSIDERATION1
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 patient states that she is feeling much better this morning. Pain is controlled and dressing appears clean. Setting up VNS for homecare. Instructed patient to shower prior to wet-to-dry dressing changes by visiting nurse.
75yo female POD#2 s/p incision and drainage of left shoulder and left forearm abscesses. Patient had acute drop in H/H, received 1U pRBC transfusion. States that she has been transfused 4 times due to anemia related to her MDS. She states that her daughter will care for her in [place], so dressing changes will be taught to the family. Plan for discharge home once teaching is completed with follow up in Surgery clinic and Heme-Onc.
None

## 2025-06-30 NOTE — ED ADULT NURSE NOTE - OBJECTIVE STATEMENT
Pt with PMHx CKD, T2DM on insulin, CHF s/p defibrillator/PPM, , HTN, s/p L BKA presents ED c/o of total body itching, RLE wound and low BP in PMD office.  ,Pt  was prescribed clindamycin by PCP because patient cut his lower right shin about 1 week ago, took first dose on morning of 6/28 (no subsequent doses taken since). Yesterday developed full body itching that progressed to hives at nighttime.  Took 2 doses of Benadryl last night without relief. Pt is in no distress. Breathing easy and non labored. Pt is ambulatory

## 2025-06-30 NOTE — ED CDU PROVIDER DISPOSITION NOTE - CLINICAL COURSE
63 yo male PMHx CKD, T2DM on insulin, CHF s/p defibrillator/PPM, (last EF 20% on echo from 2024), HTN, s/p L EILEENA presents ED complaining of total body itching, RLE wound and low BP in PMD office.  Patient cut his lower right shin about 1 week ago, was prescribed clindamycin by PCP, took first dose on morning of 6/28 (no subsequent doses taken since). Yesterday developed full body itching that progressed to hives at nighttime.  Took 2 doses of Benadryl last night without relief.  Patient had persistent itching this morning so took additional dose of Benadryl and wife noted redness spreading up the leg and was concerned, went back to PMD found to be hypotensive in office today 81/50 so EMS was called and patient brought to ED.  At this time patient only complaining of itching to total body.  Of note he did fall over the weekend with left hip and right knee trauma, reportedly had left hip x-rayed as outpatient which was normal, denies pain at this time there.  Denies chest pain, shortness of breath, throat closing sensation, weakness, dizziness/lightheadedness, abdominal pain, nausea/vomiting.  In the emergency department patient was found to have mild leukocytosis of 12, mild hypokalemia 3.3.  Kidney function roughly at baseline.  Elevated procalcitonin 0.41.  Elevated proBNP 3800 not clinically fluid overloaded.  X-ray of the chest showed small right pleural effusion with associated atelectasis x-rays demonstrated chronic appearing ossific body adjacent to the distal right fibular, patient not tender at this point patient was started on IV doxycycline, went to CDU for further management of right lower extremity cellulitis. 63 yo male PMHx CKD, T2DM on insulin, CHF s/p defibrillator/PPM, (last EF 20% on echo from 2024), HTN, s/p L REGINA presents ED complaining of total body itching, RLE wound and low BP in PMD office.  Patient cut his lower right shin about 1 week ago, was prescribed clindamycin by PCP, took first dose on morning of 6/28 (no subsequent doses taken since). Yesterday developed full body itching that progressed to hives at nighttime.  Took 2 doses of Benadryl last night without relief.  Patient had persistent itching this morning so took additional dose of Benadryl and wife noted redness spreading up the leg and was concerned, went back to PMD found to be hypotensive in office today 81/50 so EMS was called and patient brought to ED.  At this time patient only complaining of itching to total body.  Of note he did fall over the weekend with left hip and right knee trauma, reportedly had left hip x-rayed as outpatient which was normal, denies pain at this time there.  Denies chest pain, shortness of breath, throat closing sensation, weakness, dizziness/lightheadedness, abdominal pain, nausea/vomiting.  In the emergency department patient was found to have mild leukocytosis of 12, mild hypokalemia 3.3.  Kidney function roughly at baseline.  Elevated procalcitonin 0.41.  Elevated proBNP 3800 not clinically fluid overloaded.  X-ray of the chest showed small right pleural effusion with associated atelectasis x-rays demonstrated chronic appearing ossific body adjacent to the distal right fibular, patient not tender at this point patient was started on IV doxycycline, went to CDU for further management of right lower extremity cellulitis. While in CDU pt had IV ABX. Found to have worsening MANJIT today. Will admit for further care.

## 2025-06-30 NOTE — ED ADULT TRIAGE NOTE - CHIEF COMPLAINT QUOTE
Pruritic rash starting last night worsening today after starting clindamycin yesterday for RLE cellulitis.

## 2025-06-30 NOTE — ED PROVIDER NOTE - OBJECTIVE STATEMENT
65 yo male PMHx CKD, T2DM on insulin, CHF s/p defibrillator/PPM, (last EF 20% on echo from 2024), HTN, s/p L REGINA presents ED complaining of total body itching, RLE wound and low BP in PMD office.  Patient cut his lower right shin about 1 week ago, was prescribed clindamycin by PCP, took first dose on morning of 6/28 (no subsequent doses taken since). Yesterday developed full body itching that progressed to hives at nighttime.  Took 2 doses of Benadryl last night without relief.  Patient had persistent itching this morning so took additional dose of Benadryl and wife noted redness spreading up the leg and was concerned, went back to PMD found to be hypotensive in office today 81/50 so EMS was called and patient brought to ED.  At this time patient only complaining of itching to total body.  Of note he did fall over the weekend with left hip and right knee trauma, reportedly had left hip x-rayed as outpatient which was normal, denies pain at this time there.  Denies chest pain, shortness of breath, throat closing sensation, weakness, dizziness/lightheadedness, abdominal pain, nausea/vomiting.

## 2025-06-30 NOTE — ED ADULT NURSE REASSESSMENT NOTE - NS ED NURSE REASSESS COMMENT FT1
Ethan DIAZ made aware of fs glu of 443; pt did not receive premeal insulin; states he had an extra portion of dinner.

## 2025-06-30 NOTE — ED ADULT TRIAGE NOTE - AVIAN FLU SYMPTOMS
Detail Level: Zone Render Risk Assessment In Note?: no Note Text (......Xxx Chief Complaint.): This diagnosis correlates with the Other (Free Text): Sunscreen No

## 2025-06-30 NOTE — ED CDU PROVIDER DISPOSITION NOTE - ATTENDING APP SHARED VISIT CONTRIBUTION OF CARE
I have personally performed a face to face medical and diagnostic evaluation of the patient. I have discussed with and reviewed the Resident's and/or ACP's and/or Medical/PA/NP student's note and agree with the History, ROS, Physical Exam and MDM unless otherwise indicated. A brief summary of my personal evaluation and impression can be found below.     see attg cdu subsequent day note

## 2025-06-30 NOTE — ED ADULT NURSE REASSESSMENT NOTE - NS ED NURSE REASSESS COMMENT FT1
resting in recliner; pt removed his prosthetic leg from left BKA and has it at bedside; cellulitis right leg; alert and oriented; skin warm and dry; pt is on cardiac monitor; SL in place; no c/o pain; plan of care reviewed.

## 2025-06-30 NOTE — ED CDU PROVIDER DISPOSITION NOTE - NSFOLLOWUPINSTRUCTIONS_ED_ALL_ED_FT
Please follow up with your primary care doctor in 1-3 days.  *Bring all printed lab/test results to your appointment(s).*    Take doxycycline twice a day as prescribed  (Please read all medication information/instructions).    Continue all home medications    Take acetaminophen 500-1000mg every 6 hrs as needed for pain. DO NOT EXCEED 4000mg DAILY.    Return for worsening pain, redness, swelling, fevers, chills, or any other concerns.

## 2025-07-01 DIAGNOSIS — T78.40XA ALLERGY, UNSPECIFIED, INITIAL ENCOUNTER: ICD-10-CM

## 2025-07-01 LAB
A1C WITH ESTIMATED AVERAGE GLUCOSE RESULT: 10.3 % — HIGH (ref 4–5.6)
ADD ON TEST-SPECIMEN IN LAB: SIGNIFICANT CHANGE UP
B-OH-BUTYR SERPL-SCNC: 0.1 MMOL/L — SIGNIFICANT CHANGE UP
BASOPHILS # BLD AUTO: 0.02 K/UL — SIGNIFICANT CHANGE UP (ref 0–0.2)
BASOPHILS NFR BLD AUTO: 0.2 % — SIGNIFICANT CHANGE UP (ref 0–2)
CRP SERPL-MCNC: 41 MG/L — HIGH (ref 0–4)
EOSINOPHIL # BLD AUTO: 0 K/UL — SIGNIFICANT CHANGE UP (ref 0–0.5)
EOSINOPHIL NFR BLD AUTO: 0 % — SIGNIFICANT CHANGE UP (ref 0–6)
ESTIMATED AVERAGE GLUCOSE: 249 MG/DL — HIGH (ref 68–114)
GAS PNL BLDV: SIGNIFICANT CHANGE UP
GLUCOSE BLDC GLUCOMTR-MCNC: 246 MG/DL — HIGH (ref 70–99)
GLUCOSE BLDC GLUCOMTR-MCNC: 253 MG/DL — HIGH (ref 70–99)
GLUCOSE BLDC GLUCOMTR-MCNC: 287 MG/DL — HIGH (ref 70–99)
GLUCOSE BLDC GLUCOMTR-MCNC: 293 MG/DL — HIGH (ref 70–99)
GLUCOSE BLDC GLUCOMTR-MCNC: 331 MG/DL — HIGH (ref 70–99)
GLUCOSE BLDC GLUCOMTR-MCNC: 344 MG/DL — HIGH (ref 70–99)
GLUCOSE BLDC GLUCOMTR-MCNC: 351 MG/DL — HIGH (ref 70–99)
GLUCOSE BLDC GLUCOMTR-MCNC: 369 MG/DL — HIGH (ref 70–99)
GLUCOSE BLDC GLUCOMTR-MCNC: 429 MG/DL — HIGH (ref 70–99)
HCT VFR BLD CALC: 39 % — SIGNIFICANT CHANGE UP (ref 39–50)
HGB BLD-MCNC: 11.8 G/DL — LOW (ref 13–17)
IMM GRANULOCYTES # BLD AUTO: 0.1 K/UL — HIGH (ref 0–0.07)
IMM GRANULOCYTES NFR BLD AUTO: 1.1 % — HIGH (ref 0–0.9)
LYMPHOCYTES # BLD AUTO: 0.53 K/UL — LOW (ref 1–3.3)
LYMPHOCYTES NFR BLD AUTO: 5.7 % — LOW (ref 13–44)
MCHC RBC-ENTMCNC: 22.7 PG — LOW (ref 27–34)
MCHC RBC-ENTMCNC: 30.3 G/DL — LOW (ref 32–36)
MCV RBC AUTO: 75.1 FL — LOW (ref 80–100)
MONOCYTES # BLD AUTO: 0.16 K/UL — SIGNIFICANT CHANGE UP (ref 0–0.9)
MONOCYTES NFR BLD AUTO: 1.7 % — LOW (ref 2–14)
NEUTROPHILS # BLD AUTO: 8.49 K/UL — HIGH (ref 1.8–7.4)
NEUTROPHILS NFR BLD AUTO: 91.3 % — HIGH (ref 43–77)
NRBC # BLD AUTO: 0 K/UL — SIGNIFICANT CHANGE UP (ref 0–0)
NRBC # FLD: 0 K/UL — SIGNIFICANT CHANGE UP (ref 0–0)
NRBC BLD AUTO-RTO: 0 /100 WBCS — SIGNIFICANT CHANGE UP (ref 0–0)
PLATELET # BLD AUTO: 152 K/UL — SIGNIFICANT CHANGE UP (ref 150–400)
PMV BLD: 9.8 FL — SIGNIFICANT CHANGE UP (ref 7–13)
RBC # BLD: 5.19 M/UL — SIGNIFICANT CHANGE UP (ref 4.2–5.8)
RBC # FLD: 16.9 % — HIGH (ref 10.3–14.5)
WBC # BLD: 9.3 K/UL — SIGNIFICANT CHANGE UP (ref 3.8–10.5)
WBC # FLD AUTO: 9.3 K/UL — SIGNIFICANT CHANGE UP (ref 3.8–10.5)

## 2025-07-01 PROCEDURE — 99233 SBSQ HOSP IP/OBS HIGH 50: CPT | Mod: FS

## 2025-07-01 PROCEDURE — 73590 X-RAY EXAM OF LOWER LEG: CPT

## 2025-07-01 PROCEDURE — 99222 1ST HOSP IP/OBS MODERATE 55: CPT

## 2025-07-01 PROCEDURE — 83036 HEMOGLOBIN GLYCOSYLATED A1C: CPT

## 2025-07-01 PROCEDURE — 80053 COMPREHEN METABOLIC PANEL: CPT

## 2025-07-01 PROCEDURE — 85025 COMPLETE CBC W/AUTO DIFF WBC: CPT

## 2025-07-01 PROCEDURE — 85018 HEMOGLOBIN: CPT

## 2025-07-01 PROCEDURE — 86140 C-REACTIVE PROTEIN: CPT

## 2025-07-01 PROCEDURE — 84132 ASSAY OF SERUM POTASSIUM: CPT

## 2025-07-01 PROCEDURE — 85652 RBC SED RATE AUTOMATED: CPT

## 2025-07-01 PROCEDURE — 73562 X-RAY EXAM OF KNEE 3: CPT

## 2025-07-01 PROCEDURE — 82803 BLOOD GASES ANY COMBINATION: CPT

## 2025-07-01 PROCEDURE — 73610 X-RAY EXAM OF ANKLE: CPT

## 2025-07-01 PROCEDURE — 94640 AIRWAY INHALATION TREATMENT: CPT

## 2025-07-01 PROCEDURE — 83605 ASSAY OF LACTIC ACID: CPT

## 2025-07-01 PROCEDURE — 80048 BASIC METABOLIC PNL TOTAL CA: CPT

## 2025-07-01 PROCEDURE — 85014 HEMATOCRIT: CPT

## 2025-07-01 PROCEDURE — 73552 X-RAY EXAM OF FEMUR 2/>: CPT

## 2025-07-01 PROCEDURE — 84145 PROCALCITONIN (PCT): CPT

## 2025-07-01 PROCEDURE — 87040 BLOOD CULTURE FOR BACTERIA: CPT

## 2025-07-01 PROCEDURE — 84295 ASSAY OF SERUM SODIUM: CPT

## 2025-07-01 PROCEDURE — 82330 ASSAY OF CALCIUM: CPT

## 2025-07-01 PROCEDURE — 82947 ASSAY GLUCOSE BLOOD QUANT: CPT

## 2025-07-01 PROCEDURE — 82435 ASSAY OF BLOOD CHLORIDE: CPT

## 2025-07-01 PROCEDURE — 85730 THROMBOPLASTIN TIME PARTIAL: CPT

## 2025-07-01 PROCEDURE — 83880 ASSAY OF NATRIURETIC PEPTIDE: CPT

## 2025-07-01 PROCEDURE — 82010 KETONE BODYS QUAN: CPT

## 2025-07-01 PROCEDURE — 82962 GLUCOSE BLOOD TEST: CPT

## 2025-07-01 PROCEDURE — 71045 X-RAY EXAM CHEST 1 VIEW: CPT

## 2025-07-01 PROCEDURE — 85610 PROTHROMBIN TIME: CPT

## 2025-07-01 PROCEDURE — G0545: CPT

## 2025-07-01 RX ORDER — ACETAMINOPHEN 500 MG/5ML
1000 LIQUID (ML) ORAL ONCE
Refills: 0 | Status: COMPLETED | OUTPATIENT
Start: 2025-07-01 | End: 2025-07-01

## 2025-07-01 RX ORDER — INSULIN LISPRO 100 U/ML
10 INJECTION, SOLUTION INTRAVENOUS; SUBCUTANEOUS ONCE
Refills: 0 | Status: COMPLETED | OUTPATIENT
Start: 2025-07-01 | End: 2025-07-01

## 2025-07-01 RX ORDER — CEFAZOLIN SODIUM IN 0.9 % NACL 3 G/100 ML
1000 INTRAVENOUS SOLUTION, PIGGYBACK (ML) INTRAVENOUS EVERY 8 HOURS
Refills: 0 | Status: DISCONTINUED | OUTPATIENT
Start: 2025-07-01 | End: 2025-07-03

## 2025-07-01 RX ORDER — HEPARIN SODIUM 1000 [USP'U]/ML
5000 INJECTION INTRAVENOUS; SUBCUTANEOUS EVERY 12 HOURS
Refills: 0 | Status: DISCONTINUED | OUTPATIENT
Start: 2025-07-01 | End: 2025-07-03

## 2025-07-01 RX ORDER — ASPIRIN 325 MG
81 TABLET ORAL DAILY
Refills: 0 | Status: DISCONTINUED | OUTPATIENT
Start: 2025-07-01 | End: 2025-07-01

## 2025-07-01 RX ORDER — ALBUTEROL SULFATE 2.5 MG/3ML
2 VIAL, NEBULIZER (ML) INHALATION EVERY 6 HOURS
Refills: 0 | Status: DISCONTINUED | OUTPATIENT
Start: 2025-07-01 | End: 2025-07-03

## 2025-07-01 RX ADMIN — BUMETANIDE 4 MILLIGRAM(S): 1 TABLET ORAL at 06:42

## 2025-07-01 RX ADMIN — CLOPIDOGREL BISULFATE 75 MILLIGRAM(S): 75 TABLET, FILM COATED ORAL at 12:36

## 2025-07-01 RX ADMIN — INSULIN LISPRO 1: 100 INJECTION, SOLUTION INTRAVENOUS; SUBCUTANEOUS at 22:30

## 2025-07-01 RX ADMIN — ROSUVASTATIN CALCIUM 20 MILLIGRAM(S): 5 TABLET, FILM COATED ORAL at 21:58

## 2025-07-01 RX ADMIN — TAMSULOSIN HYDROCHLORIDE 0.4 MILLIGRAM(S): 0.4 CAPSULE ORAL at 21:59

## 2025-07-01 RX ADMIN — Medication 2 PUFF(S): at 16:58

## 2025-07-01 RX ADMIN — Medication 81 MILLIGRAM(S): at 12:36

## 2025-07-01 RX ADMIN — METOPROLOL SUCCINATE 25 MILLIGRAM(S): 50 TABLET, EXTENDED RELEASE ORAL at 22:30

## 2025-07-01 RX ADMIN — Medication 100 MILLIGRAM(S): at 06:09

## 2025-07-01 RX ADMIN — SACUBITRIL AND VALSARTAN 1 TABLET(S): 6; 6 PELLET ORAL at 19:00

## 2025-07-01 RX ADMIN — INSULIN LISPRO 8 UNIT(S): 100 INJECTION, SOLUTION INTRAVENOUS; SUBCUTANEOUS at 16:57

## 2025-07-01 RX ADMIN — HEPARIN SODIUM 5000 UNIT(S): 1000 INJECTION INTRAVENOUS; SUBCUTANEOUS at 19:00

## 2025-07-01 RX ADMIN — INSULIN LISPRO 8 UNIT(S): 100 INJECTION, SOLUTION INTRAVENOUS; SUBCUTANEOUS at 12:16

## 2025-07-01 RX ADMIN — PREDNISONE 40 MILLIGRAM(S): 20 TABLET ORAL at 06:06

## 2025-07-01 RX ADMIN — SACUBITRIL AND VALSARTAN 1 TABLET(S): 6; 6 PELLET ORAL at 06:06

## 2025-07-01 RX ADMIN — INSULIN LISPRO 8 UNIT(S): 100 INJECTION, SOLUTION INTRAVENOUS; SUBCUTANEOUS at 08:44

## 2025-07-01 RX ADMIN — Medication 100 MILLIGRAM(S): at 22:11

## 2025-07-01 RX ADMIN — Medication 400 MILLIGRAM(S): at 02:01

## 2025-07-01 RX ADMIN — INSULIN GLARGINE-YFGN 40 UNIT(S): 100 INJECTION, SOLUTION SUBCUTANEOUS at 23:07

## 2025-07-01 RX ADMIN — Medication 40 MILLIGRAM(S): at 08:45

## 2025-07-01 RX ADMIN — Medication 2 PUFF(S): at 21:57

## 2025-07-01 RX ADMIN — INSULIN LISPRO 10 UNIT(S): 100 INJECTION, SOLUTION INTRAVENOUS; SUBCUTANEOUS at 01:54

## 2025-07-01 RX ADMIN — Medication 200 MILLILITER(S): at 03:15

## 2025-07-01 NOTE — ED CDU PROVIDER SUBSEQUENT DAY NOTE - PROGRESS NOTE DETAILS
Pt w/ elevated Lactate 3.1, glucose 431 no signs of DKA on labs. c/d/w Dr. Schwab, will give IVFs and recheck Lactate for worsening cellulitis vs dehydration. Patient seen at bedside in NAD.  VSS.  Labs reviewed, MANJIT noted. Glucose and lactate improving. Patient resting comfortably, denies any RLE pain/weakness. On exam, redness noted extending above right knee to medical aspect of right thigh. Will admit for further management. Case dw Dr. Beck. Yamel Porras PA-C Xray R ankle notable for 0.5 cm ossific body adjacent to the distal fibula   which has a corticated chronic appearance. Pt w/o any TTP to ankle. Yamel Porras PA-C

## 2025-07-01 NOTE — ED CDU PROVIDER SUBSEQUENT DAY NOTE - HISTORY
CDU PROGRESS NOTE JOE DARBY: Pt resting in stretcher in NAD. Ext: S/p L BKA. unchanged RLE w/ 1 cm scabbed over wound to distal anterior tibia with surrounding erythema extending up leg to medial thigh. +chronic vascular skin changes noted to ZOILA mary. FS glucose 429 @ 00:15. Will give add'l 10 units admelog subcut w/ labs now r/o DKA. Pt received 40 units of Lantus last night.

## 2025-07-01 NOTE — CONSULT NOTE ADULT - ASSESSMENT
63 yo male PMHx CKD, T2DM on insulin, CHF s/p defibrillator/PPM, (last EF 20% on echo from 2024), HTN, s/p L BKA presents ED complaining of total body itching, RLE wound and low BP in PMD office.  Patient cut his lower right shin about 1 week ago, was prescribed clindamycin by PCP, took first dose on morning of 6/28 (no subsequent doses taken since). Yesterday developed full body itching that progressed to hives at nighttime.  Took 2 doses of Benadryl last night without relief.  Patient had persistent itching this morning so took additional dose of Benadryl and wife noted redness spreading up the leg and was concerned, went back to PMD found to be hypotensive in office today 81/50 so EMS was called and patient brought to ED.  At this time patient only complaining of itching to total body.  Of note he did fall over the weekend with left hip and right knee trauma, reportedly had left hip x-rayed as outpatient which was normal, denies pain at this time there.  Denies chest pain, shortness of breath, throat closing sensation, weakness, dizziness/lightheadedness, abdominal pain, nausea/vomiting. ID consulted for antibiotic management of the RLE cellulitis.     # uncontrolled DM with hyperglycemia A1c: 10.8%   # RLE swelling and erythema   # leucocytosis   # multiple antibiotic allergy     MICRO:   06/30 Bcx- IP     Recommendations:     In addition to reviewing history, imaging, documents, labs, microbiology, took into account antibiotic stewardship, local antibiogram and infection control strategies and potential transmission issues.    Discussed with the primary team.    Cecelia Li MD  Attending Physician, Division of Infectious Diseases  Department of Medicine   [facility]    Contact on TEAMS messaging from 9am - 5pm  Office: [phone] (after 5 PM or weekend)     63 yo male PMHx CKD, T2DM on insulin, CHF s/p defibrillator/PPM, (last EF 20% on echo from 2024), HTN, s/p L BKA presents ED complaining of total body itching, RLE wound and low BP in PMD office.  Patient cut his lower right shin about 1 week ago, was prescribed clindamycin by PCP, took first dose on morning of 6/28 (no subsequent doses taken since). He then developed full body itching that progressed to hives at nighttime.  Took 2 doses of Benadryl without relief.  Patient had persistent itching the following morning so took additional dose of Benadryl and wife noted redness spreading up the leg and was concerned, went back to PMD found to be hypotensive in office today 81/50 so EMS was called and patient brought to ED.  At this time patient only complaining of itching to total body.  Of note he did fall over the weekend with left hip and right knee trauma, reportedly had left hip x-rayed as outpatient which was normal, Denies pain at this time there.  Denies chest pain, shortness of breath, throat closing sensation, weakness, dizziness/lightheadedness, abdominal pain, nausea/vomiting. Afebrile on admission. Initial labs with WBC of 12.47 with left shift, creat 2.26 (has CKD). XRAY R Ankle with diffuse soft tissue swelling at the ankle. ID consulted for antibiotic management of the RLE cellulitis.     Discussed with wife at the bedside, she reports patient has taken amoxicillin in the past ( with benadryl) and was able to tolerate it. Denies any SOB, anaphylaxis or other concerning skin reaction to PCN in the past.       # uncontrolled DM with hyperglycemia A1c: 10.8%   # RLE swelling and erythema   # Itching   # leucocytosis - Improved   # multiple antibiotic allergy   # CKD    MICRO:   06/30 Bcx- IP     Recommendations:     - Would give IV cefazolin 1 gm Q8H   - Get MRSA nares   - Would await for bcx to ensure no bacteremia  - Monitor for allergic reaction   - Add on CRP.   - Would benefit from outpatient allergy evaluation       In addition to reviewing history, imaging, documents, labs, microbiology, took into account antibiotic stewardship, local antibiogram and infection control strategies and potential transmission issues.    Discussed with the primary team.    Cecelia Li MD  Attending Physician, Division of Infectious Diseases  Department of Medicine   [facility]    Contact on TEAMS messaging from 9am - 5pm  Office: [phone] (after 5 PM or weekend)

## 2025-07-01 NOTE — CHART NOTE - NSCHARTNOTEFT_GEN_A_CORE
XR ankle showing  0.5 cm ossific body R distal fibula, findings on previous XR of R lateral malleolus - rec clinical correlation with point tenderness. d/w CDU JOE Montesinos. -Kieran Arita PA-C

## 2025-07-01 NOTE — H&P ADULT - NSHPLABSRESULTS_GEN_ALL_CORE
LABS:                        11.8   9.30  )-----------( 152      ( 01 Jul 2025 01:50 )             39.0     07-01    136  |  97  |  52[H]  ----------------------------<  392[H]  3.4[L]   |  21[L]  |  2.39[H]    Ca    9.0      01 Jul 2025 01:50    TPro  7.8  /  Alb  4.0  /  TBili  0.9  /  DBili  x   /  AST  14  /  ALT  10  /  AlkPhos  130[H]  06-30    PT/INR - ( 30 Jun 2025 13:54 )   PT: 13.2 sec;   INR: 1.16 ratio         PTT - ( 30 Jun 2025 13:54 )  PTT:31.6 sec      Urinalysis Basic - ( 01 Jul 2025 01:50 )    Color: x / Appearance: x / SG: x / pH: x  Gluc: 392 mg/dL / Ketone: x  / Bili: x / Urobili: x   Blood: x / Protein: x / Nitrite: x   Leuk Esterase: x / RBC: x / WBC x   Sq Epi: x / Non Sq Epi: x / Bacteria: x          07-01 @ 05:50  3.6  46

## 2025-07-01 NOTE — PATIENT PROFILE ADULT - FALL HARM RISK - HARM RISK INTERVENTIONS

## 2025-07-01 NOTE — H&P ADULT - ASSESSMENT
65 yo male      h/o  CKD, T2DM on insulin, CHF   AICD,  (last EF 20% on echo from 2024), HTN, s/p L BKA  2024,        c/o   total body  itching, RLE wound and low BP in PMD office. .   cut his lower right shin about 1 week ago, was prescribed clindamycin by   pcp     then   developed full body itching that progressed to hives at nighttime.  Took 2 doses of Benadryl  ,   persistent itching this morning so took additional dose of Benadryl and wife noted redness spreading up the leg and was concerned, went back to PMD found to be hypotensive in office   81/50 so EMS was called and patient brought to er     he did fall over the weekend with left hip and right knee trauma, reportedly had left hip x-rayed as outpatient which was normal, denies pain at this time     denies chest pain, shortness of breath, throat closing sensation, weakness, dizziness/lightheadedness, abdominal pain, nausea/vomiting.        admitted  with  right  leg  wound/  cellulitis,    s/p  fall.  few  days  ago    pt  ha s mlple  allergies. ,  deefr  ab  to house ID   CKD    DM,  hy[pergycemia,  endo  d r jr, on lantus   Cardiomyopathy   AICD        S-ICD extraction with EV-ICD implant 9/3 ([employer])  and hypotensive event 9/4. TTE 9/4 demonstrates trace pericardial effusion.   s/p   pericardiocentesis..  2024  by  Ir         Cardiomems   3/2025    HTN    PAD.  s/p L eft  BKA    dvt  ppx    mlple co  morbidities   pt  si  full  code       63 yo male      h/o  CKD, T2DM on insulin, CHF   AICD,  (last EF 20% on echo from 2024), HTN, s/p L BKA  2024,        c/o   total body  itching, RLE wound and low BP in PMD office. .   cut his lower right shin about 1 week ago, was prescribed clindamycin by   pcp     then   developed full body itching that progressed to hives at nighttime.  Took 2 doses of Benadryl  ,   persistent itching this morning so took additional dose of Benadryl and wife noted redness spreading up the leg and was concerned, went back to PMD found to be hypotensive in office   81/50 so EMS was called and patient brought to er     he did fall over the weekend with left hip and right knee trauma, reportedly had left hip x-rayed as outpatient which was normal, denies pain at this time     denies chest pain, shortness of breath, throat closing sensation, weakness, dizziness/lightheadedness, abdominal pain, nausea/vomiting.        admitted  with  right  leg  wound/  cellulitis,    s/p  fall.  few  days  ago    pt  ha s mlple  allergies. ,  deefr  ab  to house ID   CKD    DM,  hy[pergycemia,  endo  d r jr, on lantus   Cardiomyopathy   AICD        S-ICD extraction with EV-ICD implant 9/3 ([employer])  and hypotensive event 9/4. TTE 9/4 demonstrates trace pericardial effusion.   s/p   pericardiocentesis..  2024  by  Ir         Cardiomems   3/2025    HTN    PAD.  s/p L eft  BKA    dvt  ppx    dr lopez will  assume  care/    mlple co  morbidities   pt  si  full  code       63 yo male      h/o  CKD, T2DM on insulin, CHF   AICD,  (last EF 20% on echo from 2024), HTN, s/p L BKA  2024,        c/o   total body  itching, RLE wound and low BP in PMD office. .   cut his lower right shin about 1 week ago, was prescribed clindamycin by   pcp     then   developed full body itching that progressed to hives at nighttime.  Took 2 doses of Benadryl  ,   persistent itching this morning so took additional dose of Benadryl and wife noted redness spreading up the leg and was concerned, went back to PMD found to be hypotensive in office   81/50 so EMS was called and patient brought to er     he did fall over the weekend with left hip and right knee trauma, reportedly had left hip x-rayed as outpatient which was normal, denies pain at this time     denies chest pain, shortness of breath, throat closing sensation, weakness, dizziness/lightheadedness, abdominal pain, nausea/vomiting.        admitted  with  right  leg  wound/  cellulitis,    s/p  fall.  few  days  ago    pt  ha s mlple  allergies. ,  deefr  ab  to house ID   podiatry eval, xray findings   CKD    DM,  hy[pergycemia,  endo  d r norris, on lantus   Cardiomyopathy   AICD        S-ICD extraction with EV-ICD implant 9/3 ([employer])  and hypotensive event 9/4. TTE 9/4 demonstrates trace pericardial effusion.   s/p   pericardiocentesis..  2024  by  Geraldine         Cardioalec   3/2025    HTN    PAD.  s/p L eft  BKA    dvt  ppx    dr lopez will  assume  care/    mlple co  morbidities   pt  si  full  code       63 yo male      h/o  CKD, T2DM on insulin, CHF   AICD,  (last EF 20% on echo from 2024), HTN, s/p L BKA  2024,        c/o   total body  itching, RLE wound and low BP in PMD office. .   cut his lower right shin about 1 week ago, was prescribed clindamycin by   pcp     then   developed full body itching that progressed to hives at nighttime.  Took 2 doses of Benadryl  ,   persistent itching this morning so took additional dose of Benadryl and wife noted redness spreading up the leg and was concerned, went back to PMD found to be hypotensive in office   81/50 so EMS was called and patient brought to er     he did fall over the weekend with left hip and right knee trauma, reportedly had left hip x-rayed as outpatient which was normal, denies pain at this time     denies chest pain, shortness of breath, throat closing sensation, weakness, dizziness/lightheadedness, abdominal pain, nausea/vomiting.        admitted  with  right  leg  cellulitis,    s/p  fall.  few  days  ago.  and  has  a erythematous  sterak, above  knee,  extending along   medial thogh   pt  has   c/c  statsis  dermatitis,  with   c/c  kaykay  discoloration of  distal leg    pt  ha s mlple  allergies. ,  defer   ab  to house ID   podiatry eval, xray findings   CKD    DM,  hy[pergycemia,  endo  d elio norris, on lantus   Cardiomyopathy   AICD        S-ICD extraction with EV-ICD implant 9/3 ([employer]). ramsey kinney,   and hypotensive event 9/4. TTE 9/4 demonstrates trace pericardial effusion.   s/p   pericardiocentesis..  2024  by  Ir         Cardiomems   3/2025    HTN    PAD.  s/p L eft  BKA    dvt  ppx/ poke  with  wife     dr seth   will  assume  care/

## 2025-07-01 NOTE — ED CDU PROVIDER SUBSEQUENT DAY NOTE - ATTENDING APP SHARED VISIT CONTRIBUTION OF CARE
Saumya Beck DO (Attending): I have personally performed a face to face medical and diagnostic evaluation of the patient. I have discussed with and reviewed the Resident's and/or ACP's and/or Medical/PA/NP student's note and agree with the History, ROS, Physical Exam and MDM unless otherwise indicated. A brief summary of my personal evaluation and impression can be found below.     Physical exam, HPI per prior ED Provider note    Pt seen and evaluated. Pt being treated for RLE cellulitis. Concern for worsening erythema despite abx, pt high risk with multiple comorbidities and BKA at other leg. Will plan for admission for continued iv abx. Pt also with worsening hyperglycemia which will need to be monitored.

## 2025-07-01 NOTE — H&P ADULT - HISTORY OF PRESENT ILLNESS
63 yo male      h/o  CKD, T2DM on insulin, CHF   AICD,  (last EF 20% on echo from 2024), HTN, s/p L BKA       c/o   total body  itching, RLE wound and low BP in PMD office. .   cut his lower right shin about 1 week ago, was prescribed clindamycin by   pcp     then   developed full body itching that progressed to hives at nighttime.  Took 2 doses of Benadryl  ,   persistent itching this morning so took additional dose of Benadryl and wife noted redness spreading up the leg and was concerned, went back to PMD found to be hypotensive in office   81/50 so EMS was called and patient brought to er      he did fall over the weekend with left hip and right knee trauma, reportedly had left hip x-rayed as outpatient which was normal, denies pain at this time     denies chest pain, shortness of breath, throat closing sensation, weakness, dizziness/lightheadedness, abdominal pain, nausea/vomiting.

## 2025-07-01 NOTE — CONSULT NOTE ADULT - SUBJECTIVE AND OBJECTIVE BOX
Patient is a 64y old  Male who presents with a chief complaint of right  leg  infection (01 Jul 2025 10:05)    HPI:    65 yo male      h/o  CKD, T2DM on insulin, CHF   AICD,  (last EF 20% on echo from 2024), HTN, s/p L BKA       c/o   total body  itching, RLE wound and low BP in PMD office. .   cut his lower right shin about 1 week ago, was prescribed clindamycin by   pcp     then   developed full body itching that progressed to hives at nighttime.  Took 2 doses of Benadryl  ,   persistent itching this morning so took additional dose of Benadryl and wife noted redness spreading up the leg and was concerned, went back to PMD found to be hypotensive in office   81/50 so EMS was called and patient brought to er      he did fall over the weekend with left hip and right knee trauma, reportedly had left hip x-rayed as outpatient which was normal, denies pain at this time     denies chest pain, shortness of breath, throat closing sensation, weakness, dizziness/lightheadedness, abdominal pain, nausea/vomiting.   (01 Jul 2025 10:05)       prior hospital charts reviewed [ X ]  primary team notes reviewed [  X]  other consultant notes reviewed [ X ]    PAST MEDICAL & SURGICAL HISTORY:  Stented coronary artery      Diabetes      AICD (automatic cardioverter/defibrillator) present      Hypertension      Heart failure with reduced ejection fraction      History of ischemic cardiomyopathy      History of COPD      H/O gastroesophageal reflux (GERD)      2019 novel coronavirus disease (COVID-19)      COVID-19 vaccine series completed      H/O vasectomy  20 yrs ago (2000)          Allergies  ceftriaxone (Rash)  vancomycin (Rash (Mild to Mod))  doxepin (Other)  penicillins (Short breath; Rash)  Zosyn (Pruritus)    ANTIMICROBIALS (past 90 days)  MEDICATIONS  (STANDING):  doxycycline IVPB   100 mL/Hr IV Intermittent (06-30-25 @ 15:07)    doxycycline IVPB   100 mL/Hr IV Intermittent (07-01-25 @ 06:09)        doxycycline IVPB 100 every 12 hours    MEDICATIONS  (STANDING):  albuterol    90 MICROgram(s) HFA Inhaler 2 every 6 hours  aspirin  chewable 81 daily  bumetanide 4 daily  clopidogrel Tablet 75 daily  dextrose 50% Injectable 25 once  dextrose 50% Injectable 12.5 once  dextrose 50% Injectable 25 once  dextrose Oral Gel 15 once PRN  glucagon  Injectable 1 once  heparin   Injectable 5000 every 12 hours  insulin glargine Injectable (LANTUS) 40 at bedtime  insulin lispro (ADMELOG) corrective regimen sliding scale  at bedtime  insulin lispro Injectable (ADMELOG) 8 three times a day before meals  metoprolol succinate ER 25 daily  pantoprazole    Tablet 40 before breakfast  predniSONE   Tablet 40 daily  rosuvastatin 20 at bedtime  sacubitril 24 mG/valsartan 26 mG 1 two times a day  tamsulosin 0.4 at bedtime    SOCIAL HISTORY:       FAMILY HISTORY:  Family history of COPD (chronic obstructive pulmonary disease) (Sibling)    Family history of cardiac disorder  Paternal      REVIEW OF SYSTEMS  [  ] ROS unobtainable because:    [X  ] All other systems negative except as noted below:	    Constitutional:  [ ] fever [ ] chills  [ ] weight loss  [ ] weakness  Skin:  [ ] rash [ ] phlebitis	  Eyes: [ ] icterus [ ] pain  [ ] discharge	  ENMT: [ ] sore throat  [ ] thrush [ ] ulcers [ ] exudates  Respiratory: [ ] dyspnea [ ] hemoptysis [ ] cough [ ] sputum	  Cardiovascular:  [ ] chest pain [ ] palpitations [ ] edema	  Gastrointestinal:  [ ] nausea [ ] vomiting [ ] diarrhea [ ] constipation [ ] pain	  Genitourinary:  [ ] dysuria [ ] frequency [ ] hematuria [ ] discharge [ ] flank pain  [ ] incontinence  Musculoskeletal:  [ ] myalgias [ ] arthralgias [ ] arthritis  [ ] back pain  Neurological:  [ ] headache [ ] seizures  [ ] confusion/altered mental status  Psychiatric:  [ ] anxiety [ ] depression	  Hematology/Lymphatics:  [ ] lymphadenopathy  Endocrine:  [ ] adrenal [ ] thyroid  Allergic/Immunologic:	 [ ] transplant [ ] seasonal    Vital Signs Last 24 Hrs  T(F): 98 (07-01-25 @ 08:33), Max: 100.1 (07-01-25 @ 00:21)  Vital Signs Last 24 Hrs  HR: 88 (07-01-25 @ 08:33) (88 - 106)  BP: 119/73 (07-01-25 @ 08:33) (87/58 - 119/73)  RR: 18 (07-01-25 @ 08:33)  SpO2: 97% (07-01-25 @ 08:33) (94% - 99%)  Wt(kg): --    PHYSICAL EXAM:  Constitutional: non-toxic, no distress  HEAD/EYES: anicteric, no conjunctival injection  ENT:  supple, no thrush  Cardiovascular:   normal S1, S2, no murmur, no edema  Respiratory:  clear BS bilaterally, no wheezes, no rales  GI:  soft, non-tender, normal bowel sounds  :  no tinoco, no CVA tenderness  Musculoskeletal:  no synovitis, normal ROM  Neurologic: awake and alert, normal strength, no focal findings  Skin:   +erythema to all extremities, no visible hives. RLE w/ 1 cm scabbed over wound to distal anterior tibia with surrounding erythema extending up leg to medial thigh. +chronic vascular skin changes noted to R mary.   Psychiatric:  awake, alert, appropriate mood      LABS:                       11.8   9.30  )-----------( 152      ( 01 Jul 2025 01:50 )             39.0   07-01    136  |  97  |  52[H]  ----------------------------<  392[H]  3.4[L]   |  21[L]  |  2.39[H]    Ca    9.0      01 Jul 2025 01:50    TPro  7.8  /  Alb  4.0  /  TBili  0.9  /  DBili  x   /  AST  14  /  ALT  10  /  AlkPhos  130[H]  06-30      MICROBIOLOGY:              RADIOLOGY:  imaging below personally reviewed and agree with findings    ACC: [id number] EXAM:  XR FEMUR 2 VIEWS RT   ORDERED BY: GLADYS VELAZQUEZ     ACC: [id number] EXAM:  XR KNEE AP LAT OBL 3 VIEWS RT   ORDERED BY: GLADYS VELAZQUEZ     PROCEDURE DATE:  06/30/2025          INTERPRETATION:  HISTORY: R knee ecchymosis s/p fall    TECHNIQUE: 2 views of the right femur; 3 views of the right knee; 2 views   of the right tibia/fibula    COMPARISON: Knee radiographs dated 8/25/2024    IMPRESSION:    Right femur:    Evaluation of the lateral view of the right femur is limited due to   overlying objects.    No definite acute displaced fracture or dislocation. However, if there is   high clinical concern for fracture or if the patient is unable to bear   weight, MRI is a more sensitive test to evaluate for fracture.    Minimal right hip arthrosis. Vascular calcifications noted.    Right knee:    No acute displaced fracture or dislocation. Mild osteoarthritic changes   within the patellofemoral compartment. Small knee joint effusion.   Vascular calcifications noted.    Right tibia/fibula:    Cortical irregularity along the posterior aspect of the lateral malleolus   of the distal fibula with small mineralization along the distal tip of   lateral malleolus, which may be sequela of an old injury in this region   or be related to an acute displaced fracture/avulsion fracture of the of   the lateral malleolus of the ankle in this region. The right ankle is   suboptimally evaluated on the current exam. If there is clinical concern   for acute fracture or pathology within the lateral malleolus/right ankle,   recommend dedicated 3 view right ankle radiographs for better   characterization of this finding.    Vascular calcifications noted.      IMPRESSION: There is a 0.5 cm ossific body adjacent to the distal fibula   which has a corticated chronic appearance. Correlate for any point   tenderness in this location. There is diffuse soft tissue swelling at the   ankle. There is minimal spurring.   Patient is a 64y old  Male who presents with a chief complaint of right  leg  infection (01 Jul 2025 10:05)    HPI:    65 yo male      h/o  CKD, T2DM on insulin, CHF   AICD,  (last EF 20% on echo from 2024), HTN, s/p L BKA       c/o   total body  itching, RLE wound and low BP in PMD office. .   cut his lower right shin about 1 week ago, was prescribed clindamycin by   pcp     then   developed full body itching that progressed to hives at nighttime.  Took 2 doses of Benadryl  ,   persistent itching this morning so took additional dose of Benadryl and wife noted redness spreading up the leg and was concerned, went back to PMD found to be hypotensive in office   81/50 so EMS was called and patient brought to er      he did fall over the weekend with left hip and right knee trauma, reportedly had left hip x-rayed as outpatient which was normal, denies pain at this time     denies chest pain, shortness of breath, throat closing sensation, weakness, dizziness/lightheadedness, abdominal pain, nausea/vomiting.   (01 Jul 2025 10:05)       prior hospital charts reviewed [ X ]  primary team notes reviewed [  X]  other consultant notes reviewed [ X ]    PAST MEDICAL & SURGICAL HISTORY:  Stented coronary artery      Diabetes      AICD (automatic cardioverter/defibrillator) present      Hypertension      Heart failure with reduced ejection fraction      History of ischemic cardiomyopathy      History of COPD      H/O gastroesophageal reflux (GERD)      2019 novel coronavirus disease (COVID-19)      COVID-19 vaccine series completed      H/O vasectomy  20 yrs ago (2000)          Allergies  ceftriaxone (Rash)  vancomycin (Rash (Mild to Mod))  doxepin (Other)  penicillins (Short breath; Rash)  Zosyn (Pruritus)    ANTIMICROBIALS (past 90 days)  MEDICATIONS  (STANDING):  doxycycline IVPB   100 mL/Hr IV Intermittent (06-30-25 @ 15:07)    doxycycline IVPB   100 mL/Hr IV Intermittent (07-01-25 @ 06:09)        doxycycline IVPB 100 every 12 hours    MEDICATIONS  (STANDING):  albuterol    90 MICROgram(s) HFA Inhaler 2 every 6 hours  aspirin  chewable 81 daily  bumetanide 4 daily  clopidogrel Tablet 75 daily  dextrose 50% Injectable 25 once  dextrose 50% Injectable 12.5 once  dextrose 50% Injectable 25 once  dextrose Oral Gel 15 once PRN  glucagon  Injectable 1 once  heparin   Injectable 5000 every 12 hours  insulin glargine Injectable (LANTUS) 40 at bedtime  insulin lispro (ADMELOG) corrective regimen sliding scale  at bedtime  insulin lispro Injectable (ADMELOG) 8 three times a day before meals  metoprolol succinate ER 25 daily  pantoprazole    Tablet 40 before breakfast  predniSONE   Tablet 40 daily  rosuvastatin 20 at bedtime  sacubitril 24 mG/valsartan 26 mG 1 two times a day  tamsulosin 0.4 at bedtime    SOCIAL HISTORY:   Lives with his wife, Denies drug use   Occasional alcohol use and denies smoking     FAMILY HISTORY:  Family history of COPD (chronic obstructive pulmonary disease) (Sibling)    Family history of cardiac disorder  Paternal      REVIEW OF SYSTEMS  [  ] ROS unobtainable because:    [X  ] All other systems negative except as noted below:	    Constitutional:  [ ] fever [ ] chills  [ ] weight loss  [ ] weakness  Skin:  [ ] rash [ ] phlebitis	  Eyes: [ ] icterus [ ] pain  [ ] discharge	  ENMT: [ ] sore throat  [ ] thrush [ ] ulcers [ ] exudates  Respiratory: [ ] dyspnea [ ] hemoptysis [ ] cough [ ] sputum	  Cardiovascular:  [ ] chest pain [ ] palpitations [ ] edema	  Gastrointestinal:  [ ] nausea [ ] vomiting [ ] diarrhea [ ] constipation [ ] pain	  Genitourinary:  [ ] dysuria [ ] frequency [ ] hematuria [ ] discharge [ ] flank pain  [ ] incontinence  Musculoskeletal:  [ ] myalgias [ ] arthralgias [ ] arthritis  [ ] back pain  Neurological:  [ ] headache [ ] seizures  [ ] confusion/altered mental status  Psychiatric:  [ ] anxiety [ ] depression	  Hematology/Lymphatics:  [ ] lymphadenopathy  Endocrine:  [ ] adrenal [ ] thyroid  Allergic/Immunologic:	 [ ] transplant [X ] whole body itching       Vital Signs Last 24 Hrs  T(F): 98 (07-01-25 @ 08:33), Max: 100.1 (07-01-25 @ 00:21)  Vital Signs Last 24 Hrs  HR: 88 (07-01-25 @ 08:33) (88 - 106)  BP: 119/73 (07-01-25 @ 08:33) (87/58 - 119/73)  RR: 18 (07-01-25 @ 08:33)  SpO2: 97% (07-01-25 @ 08:33) (94% - 99%)  Wt(kg): --    PHYSICAL EXAM:  Constitutional: non-toxic, no distress  HEAD/EYES: anicteric, no conjunctival injection  ENT:  supple, no thrush  Cardiovascular:   normal S1, S2, no murmur, no edema  Respiratory:  clear BS bilaterally, no wheezes, no rales  GI:  soft, non-tender, normal bowel sounds  Musculoskeletal: no synovitis, normal ROM  Neurologic: awake and alert, normal strength,  Skin:  + erythema to all extremities, RLE w/ 1 cm scabbed over wound to distal anterior tibia with surrounding erythema extending up leg to medial thigh. +chronic vascular skin changes noted to R shin. Left BKA +       LABS:                       11.8   9.30  )-----------( 152      ( 01 Jul 2025 01:50 )             39.0   07-01    136  |  97  |  52[H]  ----------------------------<  392[H]  3.4[L]   |  21[L]  |  2.39[H]    Ca    9.0      01 Jul 2025 01:50    TPro  7.8  /  Alb  4.0  /  TBili  0.9  /  DBili  x   /  AST  14  /  ALT  10  /  AlkPhos  130[H]  06-30      MICROBIOLOGY:              RADIOLOGY:  imaging below personally reviewed and agree with findings    ACC: [id number] EXAM:  XR FEMUR 2 VIEWS RT   ORDERED BY: GLADYS VELAZQUEZ     ACC: [id number] EXAM:  XR KNEE AP LAT OBL 3 VIEWS RT   ORDERED BY: GLADYS VELAZQUEZ     PROCEDURE DATE:  06/30/2025          INTERPRETATION:  HISTORY: R knee ecchymosis s/p fall    TECHNIQUE: 2 views of the right femur; 3 views of the right knee; 2 views   of the right tibia/fibula    COMPARISON: Knee radiographs dated 8/25/2024    IMPRESSION:    Right femur:    Evaluation of the lateral view of the right femur is limited due to   overlying objects.    No definite acute displaced fracture or dislocation. However, if there is   high clinical concern for fracture or if the patient is unable to bear   weight, MRI is a more sensitive test to evaluate for fracture.    Minimal right hip arthrosis. Vascular calcifications noted.    Right knee:    No acute displaced fracture or dislocation. Mild osteoarthritic changes   within the patellofemoral compartment. Small knee joint effusion.   Vascular calcifications noted.    Right tibia/fibula:    Cortical irregularity along the posterior aspect of the lateral malleolus   of the distal fibula with small mineralization along the distal tip of   lateral malleolus, which may be sequela of an old injury in this region   or be related to an acute displaced fracture/avulsion fracture of the of   the lateral malleolus of the ankle in this region. The right ankle is   suboptimally evaluated on the current exam. If there is clinical concern   for acute fracture or pathology within the lateral malleolus/right ankle,   recommend dedicated 3 view right ankle radiographs for better   characterization of this finding.    Vascular calcifications noted.      IMPRESSION: There is a 0.5 cm ossific body adjacent to the distal fibula   which has a corticated chronic appearance. Correlate for any point   tenderness in this location. There is diffuse soft tissue swelling at the   ankle. There is minimal spurring.

## 2025-07-01 NOTE — H&P ADULT - NSHPPHYSICALEXAM_GEN_ALL_CORE
T(C): 36.7 (07-01-25 @ 08:33), Max: 37.8 (07-01-25 @ 00:21)  HR: 88 (07-01-25 @ 08:33) (88 - 106)  BP: 119/73 (07-01-25 @ 08:33) (87/58 - 119/73)  RR: 18 (07-01-25 @ 08:33) (16 - 20)  SpO2: 97% (07-01-25 @ 08:33) (94% - 99%)  GENERAL: NAD, lying in bed comfortably  HEAD:  Atraumatic, normocephalic  EYES: EOMI, PERRLA, conjunctiva and sclera clear  NECK: Supple, trachea midline, no JVD  HEART: Regular rate and rhythm, no murmurs, rubs, or gallops  LUNGS: Unlabored respirations.  Clear to auscultation bilaterally, no crackles, wheezing, or rhonchi  ABDOMEN: Soft, nontender, nondistended, +BS  EXTREMITIES: 2+ peripheral pulses bilaterally. No clubbing, cyanosis, or edema  NERVOUS SYSTEM:  A&Ox3, moving all extremities, no focal deficits   SKIN: +  rashes     BKA,  leg    lef/  right  leg wound T(C): 36.7 (07-01-25 @ 08:33), Max: 37.8 (07-01-25 @ 00:21)  HR: 88 (07-01-25 @ 08:33) (88 - 106)  BP: 119/73 (07-01-25 @ 08:33) (87/58 - 119/73)  RR: 18 (07-01-25 @ 08:33) (16 - 20)  SpO2: 97% (07-01-25 @ 08:33) (94% - 99%)  GENERAL: NAD, lying in bed comfortably  HEAD:  Atraumatic, normocephalic  EYES: EOMI, PERRLA, conjunctiva and sclera clear  NECK: Supple, trachea midline, no JVD  HEART: Regular rate and rhythm, no murmurs, rubs, or gallops  LUNGS: Unlabored respirations.  Clear to auscultation bilaterally, no crackles, wheezing, or rhonchi  ABDOMEN: Soft, nontender, nondistended, +BS  EXTREMITIES: 2+ peripheral pulses bilaterally. No clubbing, cyanosis, or edema  NERVOUS SYSTEM:  A&Ox3, moving all extremities, no focal deficits   SKIN: +  rashes     BKA,  leg    lef/  right  leg , distal c/c  redness.  with erythematous  streak above  knee,  towards  groin

## 2025-07-01 NOTE — ED CDU PROVIDER SUBSEQUENT DAY NOTE - PHYSICAL EXAMINATION
Gen: Well appearing, AAO x 3, NAD  HEENT: NC/AT, PERRLA, EOMI, MMM. Clear/visible posterior oropharynx. Uvula midline. No tongue swelling or elevation. No hoarseness.   Resp: unlabored CTAB  Cardiac: rrr s1s2, no murmurs, rubs or gallops  GI: ND, +BS, Soft, NT  Ext: S/p L BKA. +erythema to all extremities, no visible hives. RLE w/ 1 cm scabbed over wound to distal anterior tibia with surrounding erythema extending up leg to medial thigh. +chronic vascular skin changes noted to R shin.   Neuro: no focal deficits

## 2025-07-01 NOTE — PATIENT PROFILE ADULT - NSPROIMPLANTSMEDDEV_GEN_A_NUR
Automatic Implantable Cardioverter Defibrillator/Pacemaker/Prosthetic device(s)/Vascular stents/Clips

## 2025-07-02 DIAGNOSIS — E11.9 TYPE 2 DIABETES MELLITUS WITHOUT COMPLICATIONS: ICD-10-CM

## 2025-07-02 DIAGNOSIS — E78.5 HYPERLIPIDEMIA, UNSPECIFIED: ICD-10-CM

## 2025-07-02 DIAGNOSIS — I10 ESSENTIAL (PRIMARY) HYPERTENSION: ICD-10-CM

## 2025-07-02 LAB
ERYTHROCYTE [SEDIMENTATION RATE] IN BLOOD: 73 MM/HR — HIGH (ref 0–15)
GLUCOSE BLDC GLUCOMTR-MCNC: 112 MG/DL — HIGH (ref 70–99)
GLUCOSE BLDC GLUCOMTR-MCNC: 134 MG/DL — HIGH (ref 70–99)
GLUCOSE BLDC GLUCOMTR-MCNC: 195 MG/DL — HIGH (ref 70–99)
GLUCOSE BLDC GLUCOMTR-MCNC: 228 MG/DL — HIGH (ref 70–99)
GLUCOSE BLDC GLUCOMTR-MCNC: 258 MG/DL — HIGH (ref 70–99)

## 2025-07-02 PROCEDURE — 82803 BLOOD GASES ANY COMBINATION: CPT

## 2025-07-02 PROCEDURE — 86140 C-REACTIVE PROTEIN: CPT

## 2025-07-02 PROCEDURE — 85025 COMPLETE CBC W/AUTO DIFF WBC: CPT

## 2025-07-02 PROCEDURE — 85610 PROTHROMBIN TIME: CPT

## 2025-07-02 PROCEDURE — 73590 X-RAY EXAM OF LOWER LEG: CPT

## 2025-07-02 PROCEDURE — 84132 ASSAY OF SERUM POTASSIUM: CPT

## 2025-07-02 PROCEDURE — 87040 BLOOD CULTURE FOR BACTERIA: CPT

## 2025-07-02 PROCEDURE — 82330 ASSAY OF CALCIUM: CPT

## 2025-07-02 PROCEDURE — 80048 BASIC METABOLIC PNL TOTAL CA: CPT

## 2025-07-02 PROCEDURE — 82010 KETONE BODYS QUAN: CPT

## 2025-07-02 PROCEDURE — 93005 ELECTROCARDIOGRAM TRACING: CPT

## 2025-07-02 PROCEDURE — 99223 1ST HOSP IP/OBS HIGH 75: CPT | Mod: GC

## 2025-07-02 PROCEDURE — 73552 X-RAY EXAM OF FEMUR 2/>: CPT

## 2025-07-02 PROCEDURE — 80053 COMPREHEN METABOLIC PANEL: CPT

## 2025-07-02 PROCEDURE — 94640 AIRWAY INHALATION TREATMENT: CPT

## 2025-07-02 PROCEDURE — 82435 ASSAY OF BLOOD CHLORIDE: CPT

## 2025-07-02 PROCEDURE — 84145 PROCALCITONIN (PCT): CPT

## 2025-07-02 PROCEDURE — 85652 RBC SED RATE AUTOMATED: CPT

## 2025-07-02 PROCEDURE — 83880 ASSAY OF NATRIURETIC PEPTIDE: CPT

## 2025-07-02 PROCEDURE — 85018 HEMOGLOBIN: CPT

## 2025-07-02 PROCEDURE — 85730 THROMBOPLASTIN TIME PARTIAL: CPT

## 2025-07-02 PROCEDURE — 85014 HEMATOCRIT: CPT

## 2025-07-02 PROCEDURE — 84295 ASSAY OF SERUM SODIUM: CPT

## 2025-07-02 PROCEDURE — 99232 SBSQ HOSP IP/OBS MODERATE 35: CPT

## 2025-07-02 PROCEDURE — 73562 X-RAY EXAM OF KNEE 3: CPT

## 2025-07-02 PROCEDURE — 71045 X-RAY EXAM CHEST 1 VIEW: CPT

## 2025-07-02 PROCEDURE — 82962 GLUCOSE BLOOD TEST: CPT

## 2025-07-02 PROCEDURE — 83605 ASSAY OF LACTIC ACID: CPT

## 2025-07-02 PROCEDURE — 73610 X-RAY EXAM OF ANKLE: CPT

## 2025-07-02 PROCEDURE — 82947 ASSAY GLUCOSE BLOOD QUANT: CPT

## 2025-07-02 PROCEDURE — G0545: CPT

## 2025-07-02 PROCEDURE — 83036 HEMOGLOBIN GLYCOSYLATED A1C: CPT

## 2025-07-02 RX ORDER — INSULIN LISPRO 100 U/ML
INJECTION, SOLUTION INTRAVENOUS; SUBCUTANEOUS
Refills: 0 | Status: DISCONTINUED | OUTPATIENT
Start: 2025-07-02 | End: 2025-07-03

## 2025-07-02 RX ORDER — INSULIN LISPRO 100 U/ML
14 INJECTION, SOLUTION INTRAVENOUS; SUBCUTANEOUS
Refills: 0 | Status: DISCONTINUED | OUTPATIENT
Start: 2025-07-02 | End: 2025-07-03

## 2025-07-02 RX ORDER — DIPHENHYDRAMINE HCL 12.5MG/5ML
25 ELIXIR ORAL ONCE
Refills: 0 | Status: COMPLETED | OUTPATIENT
Start: 2025-07-02 | End: 2025-07-02

## 2025-07-02 RX ORDER — INSULIN LISPRO 100 U/ML
10 INJECTION, SOLUTION INTRAVENOUS; SUBCUTANEOUS
Refills: 0 | Status: DISCONTINUED | OUTPATIENT
Start: 2025-07-02 | End: 2025-07-02

## 2025-07-02 RX ADMIN — SACUBITRIL AND VALSARTAN 1 TABLET(S): 6; 6 PELLET ORAL at 17:26

## 2025-07-02 RX ADMIN — Medication 100 MILLIGRAM(S): at 06:20

## 2025-07-02 RX ADMIN — Medication 2 PUFF(S): at 21:34

## 2025-07-02 RX ADMIN — CLOPIDOGREL BISULFATE 75 MILLIGRAM(S): 75 TABLET, FILM COATED ORAL at 11:50

## 2025-07-02 RX ADMIN — INSULIN LISPRO 2: 100 INJECTION, SOLUTION INTRAVENOUS; SUBCUTANEOUS at 17:27

## 2025-07-02 RX ADMIN — METOPROLOL SUCCINATE 25 MILLIGRAM(S): 50 TABLET, EXTENDED RELEASE ORAL at 06:23

## 2025-07-02 RX ADMIN — Medication 2 PUFF(S): at 11:50

## 2025-07-02 RX ADMIN — Medication 100 MILLIGRAM(S): at 21:35

## 2025-07-02 RX ADMIN — INSULIN LISPRO 8 UNIT(S): 100 INJECTION, SOLUTION INTRAVENOUS; SUBCUTANEOUS at 12:43

## 2025-07-02 RX ADMIN — SACUBITRIL AND VALSARTAN 1 TABLET(S): 6; 6 PELLET ORAL at 06:21

## 2025-07-02 RX ADMIN — Medication 100 MILLIGRAM(S): at 13:00

## 2025-07-02 RX ADMIN — INSULIN LISPRO 8 UNIT(S): 100 INJECTION, SOLUTION INTRAVENOUS; SUBCUTANEOUS at 08:46

## 2025-07-02 RX ADMIN — Medication 2 PUFF(S): at 05:27

## 2025-07-02 RX ADMIN — Medication 25 MILLIGRAM(S): at 01:30

## 2025-07-02 RX ADMIN — ROSUVASTATIN CALCIUM 20 MILLIGRAM(S): 5 TABLET, FILM COATED ORAL at 21:36

## 2025-07-02 RX ADMIN — BUMETANIDE 4 MILLIGRAM(S): 1 TABLET ORAL at 06:22

## 2025-07-02 RX ADMIN — Medication 81 MILLIGRAM(S): at 11:50

## 2025-07-02 RX ADMIN — Medication 2 PUFF(S): at 17:26

## 2025-07-02 RX ADMIN — INSULIN GLARGINE-YFGN 40 UNIT(S): 100 INJECTION, SOLUTION SUBCUTANEOUS at 21:35

## 2025-07-02 RX ADMIN — TAMSULOSIN HYDROCHLORIDE 0.4 MILLIGRAM(S): 0.4 CAPSULE ORAL at 21:36

## 2025-07-02 RX ADMIN — HEPARIN SODIUM 5000 UNIT(S): 1000 INJECTION INTRAVENOUS; SUBCUTANEOUS at 06:20

## 2025-07-02 RX ADMIN — HEPARIN SODIUM 5000 UNIT(S): 1000 INJECTION INTRAVENOUS; SUBCUTANEOUS at 17:26

## 2025-07-02 RX ADMIN — Medication 40 MILLIGRAM(S): at 06:22

## 2025-07-02 RX ADMIN — INSULIN LISPRO 14 UNIT(S): 100 INJECTION, SOLUTION INTRAVENOUS; SUBCUTANEOUS at 17:27

## 2025-07-02 NOTE — CONSULT NOTE ADULT - ASSESSMENT
63 yo male PMHx CKD, T2DM on insulin, CHF s/p defibrillator/PPM, (last EF 20% on echo from 2024), HTN, s/p L BKA presents ED complaining of total body itching, RLE wound and low BP      Assessment  DMT2: 64y Male with DM T2 with hyperglycemia, A1C 10.3% , was on oral meds and high dose insulin at home, now on basal bolus insulin with coverage, blood sugars running high. non compliant with low carb diet, snacking at bedside.   CHF: on medications, stable, monitored.  HTN: on antihypertensive medications, monitored, asymptomatic.  CKD: Monitor labs/BMP  Obesity: No strict exercise routines, not on any weight loss program, neither on low calorie diet.        Discussed plan and management wit Dr Panchito Flanagan MD  Cell: [phone]  Office: [phone]             63 yo male PMHx CKD, T2DM on insulin, CHF s/p defibrillator/PPM, (last EF 20% on echo from 2024), HTN, s/p L BKA presents ED complaining of total body itching, RLE wound and low BP    Assessment  DMT2: 64y Male with DM T2 with hyperglycemia, A1C 10.3% , was on oral meds and high dose insulin at home, now on basal bolus insulin with coverage, blood sugars running high. non compliant with low carb diet, snacking at bedside.   CHF: on medications, stable, monitored.  HTN: on antihypertensive medications, monitored, asymptomatic.  CKD: Monitor labs/BMP  Obesity: No strict exercise routines, not on any weight loss program, neither on low calorie diet.        Discussed plan and management wit Dr Panchito Flanagan MD  Cell: [phone]  Office: [phone]

## 2025-07-02 NOTE — CONSULT NOTE ADULT - ASSESSMENT
___________________________  #Chronic venous stasis  - Low suspicion for infection at this time  - Recommend compression stockings and can continue with amlactin cream pt has at home  - Recommend liberal emmollients such as Cerave or Cetaphil creams    The patient's chart was reviewed in addition to being seen and examined at bedside with the dermatology attending Dr. Day .  Recommendations were communicated with the primary team.  Please page [phone] with a [phone] call-back number for further related questions.     Thank you for allowing us to participate in the care of this patient.  Please alert Dermatology for any new or worsening developments.    Kristen Salazar MD  Resident Physician, PGY-3  [facility]  Pager: [phone]  Office: [phone] ___________________________  #Chronic venous stasis  - Low suspicion for infection at this time  - Recommend compression stockings and can continue with amlactin cream pt has at home  - Recommend liberal emmollient such as Cerave or Cetaphil creams    The patient's chart was reviewed in addition to being seen and examined at bedside with the dermatology attending Dr. Day .  Recommendations were communicated with the primary team.  Please page [phone] with a [phone] call-back number for further related questions.     Thank you for allowing us to participate in the care of this patient.  Please alert Dermatology for any new or worsening developments.    Kristen Salazar MD  Resident Physician, PGY-3  [facility] Dermat[facility]  Pager: [phone]  Office: [phone]

## 2025-07-02 NOTE — CONSULT NOTE ADULT - SUBJECTIVE AND OBJECTIVE BOX
HPI:    65 yo male      h/o  CKD, T2DM on insulin, CHF   AICD,  (last EF 20% on echo from 2024), HTN, s/p L BKA     c/o   total body  itching, RLE wound and low BP in PMD office. .   cut his lower right shin about 1 week ago, was prescribed clindamycin by   pcp     then   developed full body itching that progressed to hives at nighttime.  Took 2 doses of Benadryl  ,   persistent itching this morning so took additional dose of Benadryl and wife noted redness spreading up the leg and was concerned, went back to PMD found to be hypotensive in office   81/50 so EMS was called and patient brought to er      he did fall over the weekend with left hip and right knee trauma, reportedly had left hip x-rayed as outpatient which was normal, denies pain at this time     denies chest pain, shortness of breath, throat closing sensation, weakness, dizziness/lightheadedness, abdominal pain, nausea/vomiting.   (01 Jul 2025 10:05)      PAST MEDICAL & SURGICAL HISTORY:  Stented coronary artery      Diabetes      AICD (automatic cardioverter/defibrillator) present      Hypertension      Heart failure with reduced ejection fraction      History of ischemic cardiomyopathy      History of COPD      H/O gastroesophageal reflux (GERD)      2019 novel coronavirus disease (COVID-19)      COVID-19 vaccine series completed      H/O vasectomy  20 yrs ago (2000)        ROS:  As above    MEDICATIONS  (STANDING):  albuterol    90 MICROgram(s) HFA Inhaler 2 Puff(s) Inhalation every 6 hours  aspirin  chewable 81 milliGRAM(s) Oral daily  bumetanide 4 milliGRAM(s) Oral daily  ceFAZolin   IVPB 1000 milliGRAM(s) IV Intermittent every 8 hours  clopidogrel Tablet 75 milliGRAM(s) Oral daily  dextrose 5%. 1000 milliLiter(s) (50 mL/Hr) IV Continuous <Continuous>  dextrose 5%. 1000 milliLiter(s) (100 mL/Hr) IV Continuous <Continuous>  dextrose 50% Injectable 25 Gram(s) IV Push once  dextrose 50% Injectable 12.5 Gram(s) IV Push once  dextrose 50% Injectable 25 Gram(s) IV Push once  glucagon  Injectable 1 milliGRAM(s) IntraMuscular once  heparin   Injectable 5000 Unit(s) SubCutaneous every 12 hours  insulin glargine Injectable (LANTUS) 40 Unit(s) SubCutaneous at bedtime  insulin lispro (ADMELOG) corrective regimen sliding scale   SubCutaneous at bedtime  insulin lispro Injectable (ADMELOG) 8 Unit(s) SubCutaneous three times a day before meals  metoprolol succinate ER 25 milliGRAM(s) Oral daily  pantoprazole    Tablet 40 milliGRAM(s) Oral before breakfast  rosuvastatin 20 milliGRAM(s) Oral at bedtime  sacubitril 24 mG/valsartan 26 mG 1 Tablet(s) Oral two times a day  tamsulosin 0.4 milliGRAM(s) Oral at bedtime    MEDICATIONS  (PRN):  dextrose Oral Gel 15 Gram(s) Oral once PRN Blood Glucose LESS THAN 70 milliGRAM(s)/deciliter      Allergies    ceftriaxone (Rash)  vancomycin (Rash (Mild to Mod))  doxepin (Other)  penicillins (Short breath; Rash)  Zosyn (Pruritus)    Intolerances        SOCIAL HISTORY:    FAMILY HISTORY:  Family history of COPD (chronic obstructive pulmonary disease) (Sibling)    Family history of cardiac disorder  Paternal        Vital Signs Last 24 Hrs  T(C): 36.7 (02 Jul 2025 11:08), Max: 36.8 (01 Jul 2025 18:43)  T(F): 98.1 (02 Jul 2025 11:08), Max: 98.2 (01 Jul 2025 18:43)  HR: 79 (02 Jul 2025 11:08) (79 - 92)  BP: 117/78 (02 Jul 2025 11:08) (93/56 - 117/78)  BP(mean): --  RR: 18 (02 Jul 2025 11:08) (18 - 18)  SpO2: 97% (02 Jul 2025 11:08) (93% - 98%)    Parameters below as of 02 Jul 2025 11:08  Patient On (Oxygen Delivery Method): room air      PHYSICAL EXAM:   The patient was alert and in no apparent distress.  There was no visible lymphadenopathy.  Conjunctiva were non injected  There was no clubbing or edema of extremities.    Of note on skin exam:     LABS:                        11.8   9.30  )-----------( 152      ( 01 Jul 2025 01:50 )             39.0     07-01    136  |  97  |  52[H]  ----------------------------<  392[H]  3.4[L]   |  21[L]  |  2.39[H]    Ca    9.0      01 Jul 2025 01:50    TPro  7.8  /  Alb  4.0  /  TBili  0.9  /  DBili  x   /  AST  14  /  ALT  10  /  AlkPhos  130[H]  06-30    PT/INR - ( 30 Jun 2025 13:54 )   PT: 13.2 sec;   INR: 1.16 ratio         PTT - ( 30 Jun 2025 13:54 )  PTT:31.6 sec  Urinalysis Basic - ( 01 Jul 2025 01:50 )    Color: x / Appearance: x / SG: x / pH: x  Gluc: 392 mg/dL / Ketone: x  / Bili: x / Urobili: x   Blood: x / Protein: x / Nitrite: x   Leuk Esterase: x / RBC: x / WBC x   Sq Epi: x / Non Sq Epi: x / Bacteria: x        RADIOLOGY & ADDITIONAL STUDIES: HPI:    65 yo male      h/o  CKD, T2DM on insulin, CHF   AICD,  (last EF 20% on echo from 2024), HTN, s/p L BKA     c/o   total body  itching, RLE wound and low BP in PMD office. .   cut his lower right shin about 1 week ago, was prescribed clindamycin by   pcp     then   developed full body itching that progressed to hives at nighttime.  Took 2 doses of Benadryl  ,   persistent itching this morning so took additional dose of Benadryl and wife noted redness spreading up the leg and was concerned, went back to PMD found to be hypotensive in office   81/50 so EMS was called and patient brought to er      he did fall over the weekend with left hip and right knee trauma, reportedly had left hip x-rayed as outpatient which was normal, denies pain at this time     denies chest pain, shortness of breath, throat closing sensation, weakness, dizziness/lightheadedness, abdominal pain, nausea/vomiting.   (01 Jul 2025 10:05)    DERM HPI: Consulted for rash on R lower leg. Pt developed 2 scrapes on R leg last week. Wife was very concerned for infection given pt has had previous bouts of cellulitis. Sent to PCP who started pt on clindamycin. Pt only took 1 dose as started to feel very itchy after. Went back to PCP. Was found to be hypotensive. Sent to the ED. Rash is non painful or tender.     PAST MEDICAL & SURGICAL HISTORY:  Stented coronary artery      Diabetes      AICD (automatic cardioverter/defibrillator) present      Hypertension      Heart failure with reduced ejection fraction      History of ischemic cardiomyopathy      History of COPD      H/O gastroesophageal reflux (GERD)      2019 novel coronavirus disease (COVID-19)      COVID-19 vaccine series completed      H/O vasectomy  20 yrs ago (2000)        ROS:  As above    MEDICATIONS  (STANDING):  albuterol    90 MICROgram(s) HFA Inhaler 2 Puff(s) Inhalation every 6 hours  aspirin  chewable 81 milliGRAM(s) Oral daily  bumetanide 4 milliGRAM(s) Oral daily  ceFAZolin   IVPB 1000 milliGRAM(s) IV Intermittent every 8 hours  clopidogrel Tablet 75 milliGRAM(s) Oral daily  dextrose 5%. 1000 milliLiter(s) (50 mL/Hr) IV Continuous <Continuous>  dextrose 5%. 1000 milliLiter(s) (100 mL/Hr) IV Continuous <Continuous>  dextrose 50% Injectable 25 Gram(s) IV Push once  dextrose 50% Injectable 12.5 Gram(s) IV Push once  dextrose 50% Injectable 25 Gram(s) IV Push once  glucagon  Injectable 1 milliGRAM(s) IntraMuscular once  heparin   Injectable 5000 Unit(s) SubCutaneous every 12 hours  insulin glargine Injectable (LANTUS) 40 Unit(s) SubCutaneous at bedtime  insulin lispro (ADMELOG) corrective regimen sliding scale   SubCutaneous at bedtime  insulin lispro Injectable (ADMELOG) 8 Unit(s) SubCutaneous three times a day before meals  metoprolol succinate ER 25 milliGRAM(s) Oral daily  pantoprazole    Tablet 40 milliGRAM(s) Oral before breakfast  rosuvastatin 20 milliGRAM(s) Oral at bedtime  sacubitril 24 mG/valsartan 26 mG 1 Tablet(s) Oral two times a day  tamsulosin 0.4 milliGRAM(s) Oral at bedtime    MEDICATIONS  (PRN):  dextrose Oral Gel 15 Gram(s) Oral once PRN Blood Glucose LESS THAN 70 milliGRAM(s)/deciliter      Allergies    ceftriaxone (Rash)  vancomycin (Rash (Mild to Mod))  doxepin (Other)  penicillins (Short breath; Rash)  Zosyn (Pruritus)    Intolerances        SOCIAL HISTORY:  prior     FAMILY HISTORY:  Family history of COPD (chronic obstructive pulmonary disease) (Sibling)    Family history of cardiac disorder  Paternal        Vital Signs Last 24 Hrs  T(C): 36.7 (02 Jul 2025 11:08), Max: 36.8 (01 Jul 2025 18:43)  T(F): 98.1 (02 Jul 2025 11:08), Max: 98.2 (01 Jul 2025 18:43)  HR: 79 (02 Jul 2025 11:08) (79 - 92)  BP: 117/78 (02 Jul 2025 11:08) (93/56 - 117/78)  BP(mean): --  RR: 18 (02 Jul 2025 11:08) (18 - 18)  SpO2: 97% (02 Jul 2025 11:08) (93% - 98%)    Parameters below as of 02 Jul 2025 11:08  Patient On (Oxygen Delivery Method): room air      PHYSICAL EXAM:   The patient was alert and in no apparent distress.  There was no visible lymphadenopathy.  Conjunctiva were non injected       Of note on skin exam:   R lower leg with purple-brown mottled thick scaly eczematous plaques with icythiosiform appearing scale, hyperkeratosis, and few heme-crusted erosions.     LABS:                        11.8   9.30  )-----------( 152      ( 01 Jul 2025 01:50 )             39.0     07-01    136  |  97  |  52[H]  ----------------------------<  392[H]  3.4[L]   |  21[L]  |  2.39[H]    Ca    9.0      01 Jul 2025 01:50    TPro  7.8  /  Alb  4.0  /  TBili  0.9  /  DBili  x   /  AST  14  /  ALT  10  /  AlkPhos  130[H]  06-30    PT/INR - ( 30 Jun 2025 13:54 )   PT: 13.2 sec;   INR: 1.16 ratio         PTT - ( 30 Jun 2025 13:54 )  PTT:31.6 sec  Urinalysis Basic - ( 01 Jul 2025 01:50 )    Color: x / Appearance: x / SG: x / pH: x  Gluc: 392 mg/dL / Ketone: x  / Bili: x / Urobili: x   Blood: x / Protein: x / Nitrite: x   Leuk Esterase: x / RBC: x / WBC x   Sq Epi: x / Non Sq Epi: x / Bacteria: x        RADIOLOGY & ADDITIONAL STUDIES: HPI:    63 yo male      h/o  CKD, T2DM on insulin, CHF   AICD,  (last EF 20% on echo from 2024), HTN, s/p L BKA     c/o   total body  itching, RLE wound and low BP in PMD office. .   cut his lower right shin about 1 week ago, was prescribed clindamycin by   pcp     then   developed full body itching that progressed to hives at nighttime.  Took 2 doses of Benadryl  ,   persistent itching this morning so took additional dose of Benadryl and wife noted redness spreading up the leg and was concerned, went back to PMD found to be hypotensive in office   81/50 so EMS was called and patient brought to er      he did fall over the weekend with left hip and right knee trauma, reportedly had left hip x-rayed as outpatient which was normal, denies pain at this time     denies chest pain, shortness of breath, throat closing sensation, weakness, dizziness/lightheadedness, abdominal pain, nausea/vomiting.   (01 Jul 2025 10:05)    DERM HPI: Consulted for rash on R lower leg. Pt developed 2 scrapes on R leg last week. Wife was very concerned for infection given pt has had previous bouts of cellulitis. Sent to PCP who started pt on clindamycin. Pt only took 1 dose as started to feel very itchy after. Went back to PCP. Was found to be hypotensive. Sent to the ED. Rash is non painful or tender.     PAST MEDICAL & SURGICAL HISTORY:  Stented coronary artery      Diabetes      AICD (automatic cardioverter/defibrillator) present      Hypertension      Heart failure with reduced ejection fraction      History of ischemic cardiomyopathy      History of COPD      H/O gastroesophageal reflux (GERD)      2019 novel coronavirus disease (COVID-19)      COVID-19 vaccine series completed      H/O vasectomy  20 yrs ago (2000)        ROS:  As above    MEDICATIONS  (STANDING):  albuterol    90 MICROgram(s) HFA Inhaler 2 Puff(s) Inhalation every 6 hours  aspirin  chewable 81 milliGRAM(s) Oral daily  bumetanide 4 milliGRAM(s) Oral daily  ceFAZolin   IVPB 1000 milliGRAM(s) IV Intermittent every 8 hours  clopidogrel Tablet 75 milliGRAM(s) Oral daily  dextrose 5%. 1000 milliLiter(s) (50 mL/Hr) IV Continuous <Continuous>  dextrose 5%. 1000 milliLiter(s) (100 mL/Hr) IV Continuous <Continuous>  dextrose 50% Injectable 25 Gram(s) IV Push once  dextrose 50% Injectable 12.5 Gram(s) IV Push once  dextrose 50% Injectable 25 Gram(s) IV Push once  glucagon  Injectable 1 milliGRAM(s) IntraMuscular once  heparin   Injectable 5000 Unit(s) SubCutaneous every 12 hours  insulin glargine Injectable (LANTUS) 40 Unit(s) SubCutaneous at bedtime  insulin lispro (ADMELOG) corrective regimen sliding scale   SubCutaneous at bedtime  insulin lispro Injectable (ADMELOG) 8 Unit(s) SubCutaneous three times a day before meals  metoprolol succinate ER 25 milliGRAM(s) Oral daily  pantoprazole    Tablet 40 milliGRAM(s) Oral before breakfast  rosuvastatin 20 milliGRAM(s) Oral at bedtime  sacubitril 24 mG/valsartan 26 mG 1 Tablet(s) Oral two times a day  tamsulosin 0.4 milliGRAM(s) Oral at bedtime    MEDICATIONS  (PRN):  dextrose Oral Gel 15 Gram(s) Oral once PRN Blood Glucose LESS THAN 70 milliGRAM(s)/deciliter      Allergies    ceftriaxone (Rash)  vancomycin (Rash (Mild to Mod))  doxepin (Other)  penicillins (Short breath; Rash)  Zosyn (Pruritus)    Intolerances        SOCIAL HISTORY:  prior     FAMILY HISTORY:  Family history of COPD (chronic obstructive pulmonary disease) (Sibling)    Family history of cardiac disorder  Paternal        Vital Signs Last 24 Hrs  T(C): 36.7 (02 Jul 2025 11:08), Max: 36.8 (01 Jul 2025 18:43)  T(F): 98.1 (02 Jul 2025 11:08), Max: 98.2 (01 Jul 2025 18:43)  HR: 79 (02 Jul 2025 11:08) (79 - 92)  BP: 117/78 (02 Jul 2025 11:08) (93/56 - 117/78)  BP(mean): --  RR: 18 (02 Jul 2025 11:08) (18 - 18)  SpO2: 97% (02 Jul 2025 11:08) (93% - 98%)    Parameters below as of 02 Jul 2025 11:08  Patient On (Oxygen Delivery Method): room air      PHYSICAL EXAM:   The patient was alert and in no apparent distress.  There was no visible lymphadenopathy.  Conjunctiva were non injected       Of note on skin exam:   R lower leg with purple-brown mottled thick scaly eczematous plaques with ichthyosiform appearing scale, hyperkeratosis, and few heme-crusted erosions.     LABS:                        11.8   9.30  )-----------( 152      ( 01 Jul 2025 01:50 )             39.0     07-01    136  |  97  |  52[H]  ----------------------------<  392[H]  3.4[L]   |  21[L]  |  2.39[H]    Ca    9.0      01 Jul 2025 01:50    TPro  7.8  /  Alb  4.0  /  TBili  0.9  /  DBili  x   /  AST  14  /  ALT  10  /  AlkPhos  130[H]  06-30    PT/INR - ( 30 Jun 2025 13:54 )   PT: 13.2 sec;   INR: 1.16 ratio         PTT - ( 30 Jun 2025 13:54 )  PTT:31.6 sec  Urinalysis Basic - ( 01 Jul 2025 01:50 )    Color: x / Appearance: x / SG: x / pH: x  Gluc: 392 mg/dL / Ketone: x  / Bili: x / Urobili: x   Blood: x / Protein: x / Nitrite: x   Leuk Esterase: x / RBC: x / WBC x   Sq Epi: x / Non Sq Epi: x / Bacteria: x        RADIOLOGY & ADDITIONAL STUDIES:

## 2025-07-02 NOTE — CONSULT NOTE ADULT - PROBLEM SELECTOR RECOMMENDATION 9
Will increase Lantus to 40 units at bed time.  Will increase Admelog to 14 units before each meal in addition to Admelog correction scale coverage.  Will continue monitoring FS, log, and glucose trends, will Follow up.  Patient counseled for compliance with consistent low carb diet and exercise as tolerated outpatient.

## 2025-07-02 NOTE — CONSULT NOTE ADULT - ATTENDING COMMENTS
Skin changes clinically consistent with chronic venous stasis. There is no dermatitis at this time. Recommend compression stocking use. May restart home ammonium lactate upon discharge.    Shayan Day MD, PharmD, MPH  Co-Director, Inpatient Dermatology Consultation Service, [facility]

## 2025-07-02 NOTE — CONSULT NOTE ADULT - SUBJECTIVE AND OBJECTIVE BOX
HPI:65 yo male h/o  CKD, T2DM on insulin, CHF   AICD,  (last EF 20% on echo from 2024), HTN, s/p L BKA       c/o   total body  itching, RLE wound and low BP in PMD office. .   cut his lower right shin about 1 week ago, was prescribed clindamycin by   pcp     then   developed full body itching that progressed to hives at nighttime.  Took 2 doses of Benadryl  ,   persistent itching this morning so took additional dose of Benadryl and wife noted redness spreading up the leg and was concerned, went back to PMD found to be hypotensive in office   81/50 so EMS was called and patient brought to er      he did fall over the weekend with left hip and right knee trauma, reportedly had left hip x-rayed as outpatient which was normal, denies pain at this time     denies chest pain, shortness of breath, throat closing sensation, weakness, dizziness/lightheadedness, abdominal pain, nausea/vomiting.   (01 Jul 2025 10:05)      Patient has history of diabetes, A1C A1C with Estimated Average Glucose Result: 10.3 %   Endo was consulted for glycemic control.      PAST MEDICAL & SURGICAL HISTORY:  Stented coronary artery      Diabetes      AICD (automatic cardioverter/defibrillator) present      Hypertension      Heart failure with reduced ejection fraction      History of ischemic cardiomyopathy      History of COPD      H/O gastroesophageal reflux (GERD)      2019 novel coronavirus disease (COVID-19)      COVID-19 vaccine series completed      H/O vasectomy  20 yrs ago (2000)          FAMILY HISTORY:  Family history of COPD (chronic obstructive pulmonary disease) (Sibling)    Family history of cardiac disorder  Paternal        Social History:            HOME MEDICATIONS:  Home Medications:  Albuterol (Eqv-ProAir HFA) 90 mcg/inh inhalation aerosol: 2 puff(s) inhaled every 6 hours as needed (19 Mar 2025 16:43)  aspirin 81 mg oral delayed release tablet: 1 tab(s) orally once a day in the morning (19 Mar 2025 16:43)  bumetanide 2 mg oral tablet: 1 tab(s) orally 2 times a day (in the morning and in the afternoon) (23 Mar 2025 13:02)  clopidogrel 75 mg oral tablet: 1 tab(s) orally once a day in the morning (19 Mar 2025 16:43)  Entresto 24 mg-26 mg oral tablet: 1 tab(s) orally 2 times a day (19 Mar 2025 16:43)  HumaLOG 100 units/mL injectable solution: 20 unit(s) subcutaneous 3 times a day (before meals) (19 Mar 2025 16:43)  Jardiance 10 mg oral tablet: 1 tab(s) orally once a day in the morning (19 Mar 2025 16:43)  Lantus 100 units/mL subcutaneous solution: 40 unit(s) subcutaneous once a day (at bedtime) (19 Mar 2025 16:43)  metoprolol succinate 25 mg oral capsule, extended release: 1 cap(s) orally once a day (at bedtime) (19 Mar 2025 16:43)  pantoprazole 40 mg oral delayed release tablet: 1 tab(s) orally once a day in the morning (19 Mar 2025 16:43)  rosuvastatin 10 mg oral tablet: 1 tab(s) orally once a day (19 Mar 2025 16:43)  spironolactone 25 mg oral tablet: 1 tab(s) orally once a day (19 Mar 2025 16:43)  tamsulosin 0.4 mg oral capsule: 1 cap(s) orally once a day (at bedtime) (19 Mar 2025 16:43)  Wixela Inhub 250 mcg-50 mcg/inh inhalation powder: 1 puff(s) inhaled 2 times a day (19 Mar 2025 16:43)            MEDICATIONS  (STANDING):  albuterol    90 MICROgram(s) HFA Inhaler 2 Puff(s) Inhalation every 6 hours  aspirin  chewable 81 milliGRAM(s) Oral daily  bumetanide 4 milliGRAM(s) Oral daily  ceFAZolin   IVPB 1000 milliGRAM(s) IV Intermittent every 8 hours  clopidogrel Tablet 75 milliGRAM(s) Oral daily  dextrose 5%. 1000 milliLiter(s) (50 mL/Hr) IV Continuous <Continuous>  dextrose 5%. 1000 milliLiter(s) (100 mL/Hr) IV Continuous <Continuous>  dextrose 50% Injectable 25 Gram(s) IV Push once  dextrose 50% Injectable 12.5 Gram(s) IV Push once  dextrose 50% Injectable 25 Gram(s) IV Push once  glucagon  Injectable 1 milliGRAM(s) IntraMuscular once  heparin   Injectable 5000 Unit(s) SubCutaneous every 12 hours  insulin glargine Injectable (LANTUS) 40 Unit(s) SubCutaneous at bedtime  insulin lispro (ADMELOG) corrective regimen sliding scale   SubCutaneous at bedtime  insulin lispro (ADMELOG) corrective regimen sliding scale   SubCutaneous three times a day before meals  insulin lispro Injectable (ADMELOG) 14 Unit(s) SubCutaneous three times a day before meals  metoprolol succinate ER 25 milliGRAM(s) Oral daily  pantoprazole    Tablet 40 milliGRAM(s) Oral before breakfast  rosuvastatin 20 milliGRAM(s) Oral at bedtime  sacubitril 24 mG/valsartan 26 mG 1 Tablet(s) Oral two times a day  tamsulosin 0.4 milliGRAM(s) Oral at bedtime    MEDICATIONS  (PRN):  dextrose Oral Gel 15 Gram(s) Oral once PRN Blood Glucose LESS THAN 70 milliGRAM(s)/deciliter      Allergies    ceftriaxone (Rash)  vancomycin (Rash (Mild to Mod))  doxepin (Other)  penicillins (Short breath; Rash)  Zosyn (Pruritus)    Intolerances        Review of Systems:  Neuro: No HA, no dizziness  Cardiovascular: No chest pain, no palpitations  Respiratory: no SOB, no cough  GI: No nausea, vomiting, abdominal pain  MSK: Denies joint/muscle pain      ALL OTHER SYSTEMS REVIEWED AND NEGATIVE        PHYSICAL EXAM:  VITALS: T(C): 36.7 (07-02-25 @ 11:08)  T(F): 98.1 (07-02-25 @ 11:08), Max: 98.2 (07-01-25 @ 18:43)  HR: 79 (07-02-25 @ 11:08) (79 - 92)  BP: 117/78 (07-02-25 @ 11:08) (93/56 - 117/78)  RR:  (18 - 18)  SpO2:  (93% - 98%)  Wt(kg): --  GENERAL: NAD, well-groomed, well-developed  NEURO:  alert and oriented  RESPIRATORY: Clear to auscultation bilaterally; No rales, rhonchi, wheezing  CARDIOVASCULAR: Si S2  GI: Soft, non distended, normal bowel sounds  MUSCULOSKELETAL: Moves all extremities equally       POCT Blood Glucose.: 258 mg/dL (07-02-25 @ 12:08)  POCT Blood Glucose.: 228 mg/dL (07-02-25 @ 08:06)  POCT Blood Glucose.: 351 mg/dL (07-01-25 @ 23:05)  POCT Blood Glucose.: 287 mg/dL (07-01-25 @ 21:42)  POCT Blood Glucose.: 344 mg/dL (07-01-25 @ 16:49)  POCT Blood Glucose.: 331 mg/dL (07-01-25 @ 16:49)  POCT Blood Glucose.: 293 mg/dL (07-01-25 @ 12:12)  POCT Blood Glucose.: 253 mg/dL (07-01-25 @ 08:20)  POCT Blood Glucose.: 246 mg/dL (07-01-25 @ 05:49)  POCT Blood Glucose.: 369 mg/dL (07-01-25 @ 01:41)  POCT Blood Glucose.: 429 mg/dL (07-01-25 @ 00:15)  POCT Blood Glucose.: 443 mg/dL (06-30-25 @ 21:51)  POCT Blood Glucose.: 93 mg/dL (06-30-25 @ 14:36)                            11.8   9.30  )-----------( 152      ( 01 Jul 2025 01:50 )             39.0       07-01    136  |  97  |  52[H]  ----------------------------<  392[H]  3.4[L]   |  21[L]  |  2.39[H]    eGFR: 30[L]    Ca    9.0      07-01    TPro  7.8  /  Alb  4.0  /  TBili  0.9  /  DBili  x   /  AST  14  /  ALT  10  /  AlkPhos  130[H]  06-30      Thyroid Function Tests:    Diet, Consistent Carbohydrate/No Snacks:   Low Sodium (06-30-25 @ 17:19) [Active]          A1C with Estimated Average Glucose Result: 10.3 % (07-01-25 @ 01:50)  A1C with Estimated Average Glucose Result: 10.4 % (03-20-25 @ 04:46)  A1C with Estimated Average Glucose Result: 9.8 % (12-18-24 @ 06:23)  A1C with Estimated Average Glucose Result: 7.5 % (08-25-24 @ 06:50)                   HPI:65 yo male h/o  CKD, T2DM on insulin, CHF   AICD,  (last EF 20% on echo from 2024), HTN, s/p L BKA       c/o   total body  itching, RLE wound and low BP in PMD office. .   cut his lower right shin about 1 week ago, was prescribed clindamycin by   pcp     then   developed full body itching that progressed to hives at nighttime.  Took 2 doses of Benadryl  ,   persistent itching this morning so took additional dose of Benadryl and wife noted redness spreading up the leg and was concerned, went back to PMD found to be hypotensive in office   81/50 so EMS was called and patient brought to er      he did fall over the weekend with left hip and right knee trauma, reportedly had left hip x-rayed as outpatient which was normal, denies pain at this time     denies chest pain, shortness of breath, throat closing sensation, weakness, dizziness/lightheadedness, abdominal pain, nausea/vomiting.   (01 Jul 2025 10:05)      Patient has history of diabetes, A1C A1C with Estimated Average Glucose Result: 10.3 %   Endo was consulted for glycemic control.      PAST MEDICAL & SURGICAL HISTORY:  Stented coronary artery      Diabetes      AICD (automatic cardioverter/defibrillator) present      Hypertension      Heart failure with reduced ejection fraction      History of ischemic cardiomyopathy      History of COPD      H/O gastroesophageal reflux (GERD)      2019 novel coronavirus disease (COVID-19)      COVID-19 vaccine series completed      H/O vasectomy  20 yrs ago (2000)          FAMILY HISTORY:  Family history of COPD (chronic obstructive pulmonary disease) (Sibling)    Family history of cardiac disorder  Paternal        Social History:            HOME MEDICATIONS:  Home Medications:  Albuterol (Eqv-ProAir HFA) 90 mcg/inh inhalation aerosol: 2 puff(s) inhaled every 6 hours as needed (19 Mar 2025 16:43)  aspirin 81 mg oral delayed release tablet: 1 tab(s) orally once a day in the morning (19 Mar 2025 16:43)  bumetanide 2 mg oral tablet: 1 tab(s) orally 2 times a day (in the morning and in the afternoon) (23 Mar 2025 13:02)  clopidogrel 75 mg oral tablet: 1 tab(s) orally once a day in the morning (19 Mar 2025 16:43)  Entresto 24 mg-26 mg oral tablet: 1 tab(s) orally 2 times a day (19 Mar 2025 16:43)  HumaLOG 100 units/mL injectable solution: 20 unit(s) subcutaneous 3 times a day (before meals) (19 Mar 2025 16:43)  Jardiance 10 mg oral tablet: 1 tab(s) orally once a day in the morning (19 Mar 2025 16:43)  Lantus 100 units/mL subcutaneous solution: 40 unit(s) subcutaneous once a day (at bedtime) (19 Mar 2025 16:43)  metoprolol succinate 25 mg oral capsule, extended release: 1 cap(s) orally once a day (at bedtime) (19 Mar 2025 16:43)  pantoprazole 40 mg oral delayed release tablet: 1 tab(s) orally once a day in the morning (19 Mar 2025 16:43)  rosuvastatin 10 mg oral tablet: 1 tab(s) orally once a day (19 Mar 2025 16:43)  spironolactone 25 mg oral tablet: 1 tab(s) orally once a day (19 Mar 2025 16:43)  tamsulosin 0.4 mg oral capsule: 1 cap(s) orally once a day (at bedtime) (19 Mar 2025 16:43)  Wixela Inhub 250 mcg-50 mcg/inh inhalation powder: 1 puff(s) inhaled 2 times a day (19 Mar 2025 16:43)            MEDICATIONS  (STANDING):  albuterol    90 MICROgram(s) HFA Inhaler 2 Puff(s) Inhalation every 6 hours  aspirin  chewable 81 milliGRAM(s) Oral daily  bumetanide 4 milliGRAM(s) Oral daily  ceFAZolin   IVPB 1000 milliGRAM(s) IV Intermittent every 8 hours  clopidogrel Tablet 75 milliGRAM(s) Oral daily  dextrose 5%. 1000 milliLiter(s) (50 mL/Hr) IV Continuous <Continuous>  dextrose 5%. 1000 milliLiter(s) (100 mL/Hr) IV Continuous <Continuous>  dextrose 50% Injectable 25 Gram(s) IV Push once  dextrose 50% Injectable 12.5 Gram(s) IV Push once  dextrose 50% Injectable 25 Gram(s) IV Push once  glucagon  Injectable 1 milliGRAM(s) IntraMuscular once  heparin   Injectable 5000 Unit(s) SubCutaneous every 12 hours  insulin glargine Injectable (LANTUS) 40 Unit(s) SubCutaneous at bedtime  insulin lispro (ADMELOG) corrective regimen sliding scale   SubCutaneous at bedtime  insulin lispro (ADMELOG) corrective regimen sliding scale   SubCutaneous three times a day before meals  insulin lispro Injectable (ADMELOG) 14 Unit(s) SubCutaneous three times a day before meals  metoprolol succinate ER 25 milliGRAM(s) Oral daily  pantoprazole    Tablet 40 milliGRAM(s) Oral before breakfast  rosuvastatin 20 milliGRAM(s) Oral at bedtime  sacubitril 24 mG/valsartan 26 mG 1 Tablet(s) Oral two times a day  tamsulosin 0.4 milliGRAM(s) Oral at bedtime    MEDICATIONS  (PRN):  dextrose Oral Gel 15 Gram(s) Oral once PRN Blood Glucose LESS THAN 70 milliGRAM(s)/deciliter      Allergies    ceftriaxone (Rash)  vancomycin (Rash (Mild to Mod))  doxepin (Other)  penicillins (Short breath; Rash)  Zosyn (Pruritus)    Intolerances        Review of Systems:  Neuro: No HA, no dizziness  Cardiovascular: No chest pain, no palpitations  Respiratory: no SOB, no cough  GI: No nausea, vomiting, abdominal pain  MSK: Denies joint/muscle pain      ALL OTHER SYSTEMS REVIEWED AND NEGATIVE        PHYSICAL EXAM:  VITALS: T(C): 36.7 (07-02-25 @ 11:08)  T(F): 98.1 (07-02-25 @ 11:08), Max: 98.2 (07-01-25 @ 18:43)  HR: 79 (07-02-25 @ 11:08) (79 - 92)  BP: 117/78 (07-02-25 @ 11:08) (93/56 - 117/78)  RR:  (18 - 18)  SpO2:  (93% - 98%)  Wt(kg): --  GENERAL: NAD, well-groomed, well-developed  NEURO:  alert and oriented  RESPIRATORY: Clear to auscultation bilaterally; No rales, rhonchi, wheezing  CARDIOVASCULAR: Si S2  GI: Soft, non distended, normal bowel sounds  MUSCULOSKELETAL: Moves all extremities equally       POCT Blood Glucose.: 258 mg/dL (07-02-25 @ 12:08)  POCT Blood Glucose.: 228 mg/dL (07-02-25 @ 08:06)  POCT Blood Glucose.: 351 mg/dL (07-01-25 @ 23:05)  POCT Blood Glucose.: 287 mg/dL (07-01-25 @ 21:42)  POCT Blood Glucose.: 344 mg/dL (07-01-25 @ 16:49)  POCT Blood Glucose.: 331 mg/dL (07-01-25 @ 16:49)  POCT Blood Glucose.: 293 mg/dL (07-01-25 @ 12:12)  POCT Blood Glucose.: 253 mg/dL (07-01-25 @ 08:20)  POCT Blood Glucose.: 246 mg/dL (07-01-25 @ 05:49)  POCT Blood Glucose.: 369 mg/dL (07-01-25 @ 01:41)  POCT Blood Glucose.: 429 mg/dL (07-01-25 @ 00:15)  POCT Blood Glucose.: 443 mg/dL (06-30-25 @ 21:51)  POCT Blood Glucose.: 93 mg/dL (06-30-25 @ 14:36)                            11.8   9.30  )-----------( 152      ( 01 Jul 2025 01:50 )             39.0       07-01    136  |  97  |  52[H]  ----------------------------<  392[H]  3.4[L]   |  21[L]  |  2.39[H]    eGFR: 30[L]    Ca    9.0      07-01    TPro  7.8  /  Alb  4.0  /  TBili  0.9  /  DBili  x   /  AST  14  /  ALT  10  /  AlkPhos  130[H]  06-30      Thyroid Function Tests:    Diet, Consistent Carbohydrate/No Snacks:   Low Sodium (06-30-25 @ 17:19) [Active]          A1C with Estimated Average Glucose Result: 10.3 % (07-01-25 @ 01:50)  A1C with Estimated Average Glucose Result: 10.4 % (03-20-25 @ 04:46)  A1C with Estimated Average Glucose Result: 9.8 % (12-18-24 @ 06:23)  A1C with Estimated Average Glucose Result: 7.5 % (08-25-24 @ 06:50)

## 2025-07-03 ENCOUNTER — TRANSCRIPTION ENCOUNTER (OUTPATIENT)
Age: 65
End: 2025-07-03

## 2025-07-03 VITALS — OXYGEN SATURATION: 95 % | SYSTOLIC BLOOD PRESSURE: 98 MMHG | DIASTOLIC BLOOD PRESSURE: 68 MMHG | HEART RATE: 98 BPM

## 2025-07-03 LAB
ADD ON TEST-SPECIMEN IN LAB: SIGNIFICANT CHANGE UP
ANION GAP SERPL CALC-SCNC: 17 MMOL/L — SIGNIFICANT CHANGE UP (ref 5–17)
BUN SERPL-MCNC: 66 MG/DL — HIGH (ref 7–23)
CALCIUM SERPL-MCNC: 8.7 MG/DL — SIGNIFICANT CHANGE UP (ref 8.4–10.5)
CHLORIDE SERPL-SCNC: 103 MMOL/L — SIGNIFICANT CHANGE UP (ref 96–108)
CO2 SERPL-SCNC: 20 MMOL/L — LOW (ref 22–31)
CREAT SERPL-MCNC: 2.15 MG/DL — HIGH (ref 0.5–1.3)
EGFR: 34 ML/MIN/1.73M2 — LOW
EGFR: 34 ML/MIN/1.73M2 — LOW
GLUCOSE BLDC GLUCOMTR-MCNC: 179 MG/DL — HIGH (ref 70–99)
GLUCOSE BLDC GLUCOMTR-MCNC: 189 MG/DL — HIGH (ref 70–99)
GLUCOSE SERPL-MCNC: 151 MG/DL — HIGH (ref 70–99)
HCT VFR BLD CALC: 38.4 % — LOW (ref 39–50)
HGB BLD-MCNC: 11.2 G/DL — LOW (ref 13–17)
MAGNESIUM SERPL-MCNC: 2.3 MG/DL — SIGNIFICANT CHANGE UP (ref 1.6–2.6)
MCHC RBC-ENTMCNC: 22.9 PG — LOW (ref 27–34)
MCHC RBC-ENTMCNC: 29.2 G/DL — LOW (ref 32–36)
MCV RBC AUTO: 78.5 FL — LOW (ref 80–100)
NRBC # BLD AUTO: 0 K/UL — SIGNIFICANT CHANGE UP (ref 0–0)
NRBC # FLD: 0 K/UL — SIGNIFICANT CHANGE UP (ref 0–0)
NRBC BLD AUTO-RTO: 0 /100 WBCS — SIGNIFICANT CHANGE UP (ref 0–0)
PHOSPHATE SERPL-MCNC: 3.9 MG/DL — SIGNIFICANT CHANGE UP (ref 2.5–4.5)
PLATELET # BLD AUTO: 139 K/UL — LOW (ref 150–400)
PMV BLD: 10.1 FL — SIGNIFICANT CHANGE UP (ref 7–13)
POTASSIUM SERPL-MCNC: 3.4 MMOL/L — LOW (ref 3.5–5.3)
POTASSIUM SERPL-SCNC: 3.4 MMOL/L — LOW (ref 3.5–5.3)
RBC # BLD: 4.89 M/UL — SIGNIFICANT CHANGE UP (ref 4.2–5.8)
RBC # FLD: 17.1 % — HIGH (ref 10.3–14.5)
SODIUM SERPL-SCNC: 140 MMOL/L — SIGNIFICANT CHANGE UP (ref 135–145)
WBC # BLD: 8.59 K/UL — SIGNIFICANT CHANGE UP (ref 3.8–10.5)
WBC # FLD AUTO: 8.59 K/UL — SIGNIFICANT CHANGE UP (ref 3.8–10.5)

## 2025-07-03 PROCEDURE — 96372 THER/PROPH/DIAG INJ SC/IM: CPT

## 2025-07-03 PROCEDURE — 96374 THER/PROPH/DIAG INJ IV PUSH: CPT

## 2025-07-03 PROCEDURE — 84145 PROCALCITONIN (PCT): CPT

## 2025-07-03 PROCEDURE — 97162 PT EVAL MOD COMPLEX 30 MIN: CPT

## 2025-07-03 PROCEDURE — G0545: CPT

## 2025-07-03 PROCEDURE — 83735 ASSAY OF MAGNESIUM: CPT

## 2025-07-03 PROCEDURE — 82803 BLOOD GASES ANY COMBINATION: CPT

## 2025-07-03 PROCEDURE — 85018 HEMOGLOBIN: CPT

## 2025-07-03 PROCEDURE — 84295 ASSAY OF SERUM SODIUM: CPT

## 2025-07-03 PROCEDURE — G0378: CPT

## 2025-07-03 PROCEDURE — 80053 COMPREHEN METABOLIC PANEL: CPT

## 2025-07-03 PROCEDURE — 82962 GLUCOSE BLOOD TEST: CPT

## 2025-07-03 PROCEDURE — 83036 HEMOGLOBIN GLYCOSYLATED A1C: CPT

## 2025-07-03 PROCEDURE — 85014 HEMATOCRIT: CPT

## 2025-07-03 PROCEDURE — 73610 X-RAY EXAM OF ANKLE: CPT

## 2025-07-03 PROCEDURE — 84132 ASSAY OF SERUM POTASSIUM: CPT

## 2025-07-03 PROCEDURE — 71045 X-RAY EXAM CHEST 1 VIEW: CPT

## 2025-07-03 PROCEDURE — 85610 PROTHROMBIN TIME: CPT

## 2025-07-03 PROCEDURE — 87040 BLOOD CULTURE FOR BACTERIA: CPT

## 2025-07-03 PROCEDURE — 73552 X-RAY EXAM OF FEMUR 2/>: CPT

## 2025-07-03 PROCEDURE — 80048 BASIC METABOLIC PNL TOTAL CA: CPT

## 2025-07-03 PROCEDURE — 96376 TX/PRO/DX INJ SAME DRUG ADON: CPT

## 2025-07-03 PROCEDURE — 85730 THROMBOPLASTIN TIME PARTIAL: CPT

## 2025-07-03 PROCEDURE — 93005 ELECTROCARDIOGRAM TRACING: CPT

## 2025-07-03 PROCEDURE — 82010 KETONE BODYS QUAN: CPT

## 2025-07-03 PROCEDURE — 86140 C-REACTIVE PROTEIN: CPT

## 2025-07-03 PROCEDURE — 85025 COMPLETE CBC W/AUTO DIFF WBC: CPT

## 2025-07-03 PROCEDURE — 82330 ASSAY OF CALCIUM: CPT

## 2025-07-03 PROCEDURE — 83605 ASSAY OF LACTIC ACID: CPT

## 2025-07-03 PROCEDURE — 84100 ASSAY OF PHOSPHORUS: CPT

## 2025-07-03 PROCEDURE — 73562 X-RAY EXAM OF KNEE 3: CPT

## 2025-07-03 PROCEDURE — 83880 ASSAY OF NATRIURETIC PEPTIDE: CPT

## 2025-07-03 PROCEDURE — 73590 X-RAY EXAM OF LOWER LEG: CPT

## 2025-07-03 PROCEDURE — 99285 EMERGENCY DEPT VISIT HI MDM: CPT

## 2025-07-03 PROCEDURE — 96375 TX/PRO/DX INJ NEW DRUG ADDON: CPT

## 2025-07-03 PROCEDURE — 82435 ASSAY OF BLOOD CHLORIDE: CPT

## 2025-07-03 PROCEDURE — 99232 SBSQ HOSP IP/OBS MODERATE 35: CPT

## 2025-07-03 PROCEDURE — 85652 RBC SED RATE AUTOMATED: CPT

## 2025-07-03 PROCEDURE — 94640 AIRWAY INHALATION TREATMENT: CPT

## 2025-07-03 PROCEDURE — 82947 ASSAY GLUCOSE BLOOD QUANT: CPT

## 2025-07-03 PROCEDURE — 85027 COMPLETE CBC AUTOMATED: CPT

## 2025-07-03 RX ORDER — INSULIN LISPRO 100 U/ML
10 INJECTION, SOLUTION INTRAVENOUS; SUBCUTANEOUS
Refills: 0 | Status: DISCONTINUED | OUTPATIENT
Start: 2025-07-03 | End: 2025-07-03

## 2025-07-03 RX ORDER — ACETAMINOPHEN 500 MG/5ML
650 LIQUID (ML) ORAL EVERY 6 HOURS
Refills: 0 | Status: DISCONTINUED | OUTPATIENT
Start: 2025-07-03 | End: 2025-07-03

## 2025-07-03 RX ORDER — ROSUVASTATIN CALCIUM 5 MG/1
1 TABLET, FILM COATED ORAL
Qty: 30 | Refills: 0
Start: 2025-07-03 | End: 2025-08-01

## 2025-07-03 RX ORDER — INSULIN GLARGINE-YFGN 100 [IU]/ML
35 INJECTION, SOLUTION SUBCUTANEOUS AT BEDTIME
Refills: 0 | Status: DISCONTINUED | OUTPATIENT
Start: 2025-07-03 | End: 2025-07-03

## 2025-07-03 RX ADMIN — INSULIN LISPRO 10 UNIT(S): 100 INJECTION, SOLUTION INTRAVENOUS; SUBCUTANEOUS at 12:20

## 2025-07-03 RX ADMIN — INSULIN LISPRO 2: 100 INJECTION, SOLUTION INTRAVENOUS; SUBCUTANEOUS at 08:42

## 2025-07-03 RX ADMIN — Medication 2 PUFF(S): at 03:05

## 2025-07-03 RX ADMIN — Medication 650 MILLIGRAM(S): at 11:00

## 2025-07-03 RX ADMIN — INSULIN LISPRO 2: 100 INJECTION, SOLUTION INTRAVENOUS; SUBCUTANEOUS at 12:20

## 2025-07-03 RX ADMIN — CLOPIDOGREL BISULFATE 75 MILLIGRAM(S): 75 TABLET, FILM COATED ORAL at 11:16

## 2025-07-03 RX ADMIN — Medication 2 PUFF(S): at 09:59

## 2025-07-03 RX ADMIN — Medication 81 MILLIGRAM(S): at 11:15

## 2025-07-03 RX ADMIN — METOPROLOL SUCCINATE 25 MILLIGRAM(S): 50 TABLET, EXTENDED RELEASE ORAL at 06:16

## 2025-07-03 RX ADMIN — INSULIN LISPRO 10 UNIT(S): 100 INJECTION, SOLUTION INTRAVENOUS; SUBCUTANEOUS at 08:48

## 2025-07-03 RX ADMIN — Medication 40 MILLIGRAM(S): at 06:25

## 2025-07-03 RX ADMIN — Medication 40 MILLIEQUIVALENT(S): at 08:41

## 2025-07-03 RX ADMIN — Medication 650 MILLIGRAM(S): at 10:15

## 2025-07-03 RX ADMIN — SACUBITRIL AND VALSARTAN 1 TABLET(S): 6; 6 PELLET ORAL at 06:15

## 2025-07-03 RX ADMIN — HEPARIN SODIUM 5000 UNIT(S): 1000 INJECTION INTRAVENOUS; SUBCUTANEOUS at 06:15

## 2025-07-03 RX ADMIN — BUMETANIDE 4 MILLIGRAM(S): 1 TABLET ORAL at 06:17

## 2025-07-03 RX ADMIN — Medication 100 MILLIGRAM(S): at 06:15

## 2025-07-03 NOTE — DISCHARGE NOTE PROVIDER - NSDCFUSCHEDAPPT_GEN_ALL_CORE_FT
[facility]  [address]  Scheduled Appointment: 07/25/2025

## 2025-07-03 NOTE — PHYSICAL THERAPY INITIAL EVALUATION ADULT - PERTINENT HX OF CURRENT PROBLEM, REHAB EVAL
65 yo male h/o  CKD, T2DM on insulin, CHF, AICD, (last EF 20% on echo from 2024), HTN, s/p L BKA in 2024. C/o total body  itching, RLE wound and low BP in PMD office. Pt cut his lower right shin about 1 week ago, was prescribed clindamycin by pcp then   developed full body itching that progressed to hives at nighttime. Took 2 doses of Benadryl, persistent itching and redness spreading. Found to be hypotensive in PMD office (81/50) so EMS was called and patient brought to ER .Pt fell over the weekend with left hip and right knee trauma, reportedly had left hip x-rayed as outpatient which was normal. At this time, pt denies pain, chest pain, shortness of breath, throat closing sensation, weakness, dizziness/lightheadedness, abdominal pain, nausea/vomiting. Pt admitted  with  right  leg  cellulitis,    s/p  fall few days ago with a erythematous streak, above knee,  extending along medial thigh. Pt  has statsis  dermatitis, with kaykay discoloration of distal leg. House ID and podiatry consulted. Pt also found to have CKD, DM, hyperglycemia, AICD and cardiomyopathy. Of note, h/o ICD extraction with EV-ICD implant 9/3 ([employer]). Right tibia/fibula: Cortical irregularity along the posterior aspect of the lateral malleolus of the distal fibula with small mineralization along the distal tip of lateral malleolus, which may be sequela of an old injury in this region or be related to an acute displaced fracture/avulsion fracture of the of the lateral malleolus of the ankle in this region. The right ankle is suboptimally evaluated on the current exam. Vascular calcifications noted. R Hip and R knee : no fracture or dislocation, mild OA. CXR: Small right pleural effusion with associated atelectasis.

## 2025-07-03 NOTE — PHYSICAL THERAPY INITIAL EVALUATION ADULT - PLANNED THERAPY INTERVENTIONS, PT EVAL
Stairs goal: Pt will ascend/descend 5 steps independently with handrails in 2 weeks/balance training/strengthening/stretching/transfer training

## 2025-07-03 NOTE — DISCHARGE NOTE PROVIDER - NSDCCPCAREPLAN_GEN_ALL_CORE_FT
PRINCIPAL DISCHARGE DIAGNOSIS  Diagnosis: Cellulitis of right leg  Assessment and Plan of Treatment: You completed IV antibiotics during your hospital stay.   You were seen by the dermatology team and recommended emollient and compression stockings.   Seek medical attention if you develop fever, chills, worsening leg pain/discomfort, worsening redness.      SECONDARY DISCHARGE DIAGNOSES  Diagnosis: DM2 (diabetes mellitus, type 2)  Assessment and Plan of Treatment:      PRINCIPAL DISCHARGE DIAGNOSIS  Diagnosis: Cellulitis of right leg  Assessment and Plan of Treatment: You completed IV antibiotics during your hospital stay.   You were seen by the dermatology team and recommended emollient and compression stockings.   Seek medical attention if you develop fever, chills, worsening leg pain/discomfort, worsening redness.      SECONDARY DISCHARGE DIAGNOSES  Diagnosis: DM2 (diabetes mellitus, type 2)  Assessment and Plan of Treatment: Your A1c 10.3  Your surgar levels were elevated likely due to steroids that are not completed and your sugars imprved.   You were seen by the endocrinology team and were recommended to continue with the below regimen:  admelog 10units premeals  Lantus 20units at bedtime  Monitor your sugars premeal and before bedtime.   Follow up with your PCP within 1-2 weeks from discharge for continued management.

## 2025-07-03 NOTE — DISCHARGE NOTE PROVIDER - PROVIDER TOKENS
PROVIDER:[TOKEN:[[id number]],FOLLOWUP:[1 week],ESTABLISHEDPATIENT:[T]] PROVIDER:[TOKEN:[[id number]],FOLLOWUP:[1 week],ESTABLISHEDPATIENT:[T]],PROVIDER:[TOKEN:[[id number]],FOLLOWUP:[2 weeks],ESTABLISHEDPATIENT:[T]]

## 2025-07-03 NOTE — PROGRESS NOTE ADULT - PROBLEM SELECTOR PLAN 1
Will continue current insulin regimen for now. Will continue monitoring  blood sugars, will Follow up.  Patient counseled for compliance with consistent low carb diet and physical activity as tolerated.  . Length To Time In Minutes Device Was In Place: 10

## 2025-07-03 NOTE — PROGRESS NOTE ADULT - ASSESSMENT
65 yo male PMHx CKD, T2DM on insulin, CHF s/p defibrillator/PPM, (last EF 20% on echo from 2024), HTN, s/p L BKA presents ED complaining of total body itching, RLE wound and low BP in PMD office.  Patient cut his lower right shin about 1 week ago, was prescribed clindamycin by PCP, took first dose on morning of 6/28 (no subsequent doses taken since). He then developed full body itching that progressed to hives at nighttime.  Took 2 doses of Benadryl without relief.  Patient had persistent itching the following morning so took additional dose of Benadryl and wife noted redness spreading up the leg and was concerned, went back to PMD found to be hypotensive in office today 81/50 so EMS was called and patient brought to ED.  At this time patient only complaining of itching to total body.  Of note he did fall over the weekend with left hip and right knee trauma, reportedly had left hip x-rayed as outpatient which was normal, Denies pain at this time there.  Denies chest pain, shortness of breath, throat closing sensation, weakness, dizziness/lightheadedness, abdominal pain, nausea/vomiting. Afebrile on admission. Initial labs with WBC of 12.47 with left shift, creat 2.26 (has CKD). XRAY R Ankle with diffuse soft tissue swelling at the ankle. ID consulted for antibiotic management of the RLE cellulitis.     Discussed with wife at the bedside, she reports patient has taken amoxicillin in the past ( with benadryl) and was able to tolerate it. Denies any SOB, anaphylaxis or other concerning skin reaction to PCN in the past.         # RLE swelling and erythema   # Itching   # leucocytosis - Improved   # multiple antibiotic allergy   # CKD  # elevated CRP   # uncontrolled DM with hyperglycemia A1c: 10.8%     MICRO:   06/30 Bcx- NGTD    Recommendations:     - Can continue IV cefazolin 1 gm Q8H while inpatient. Plan for discharge with PO Keflex   - Get MRSA nares   - Would await for bcx to ensure no bacteremia- NGTD  - Monitor for allergic reaction   - Would benefit from dermatology/ allergy eval       In addition to reviewing history, imaging, documents, labs, microbiology, took into account antibiotic stewardship, local antibiogram and infection control strategies and potential transmission issues.    Discussed with the primary team.    Cecelia Li MD  Attending Physician, Division of Infectious Diseases  Department of Medicine   [facility]    Contact on TEAMS messaging from 9am - 5pm  Office: [phone] (after 5 PM or weekend)    
65 yo male PMHx CKD, T2DM on insulin, CHF s/p defibrillator/PPM, (last EF 20% on echo from 2024), HTN, s/p L BKA presents ED complaining of total body itching, RLE wound and low BP in PMD office.  Patient cut his lower right shin about 1 week ago, was prescribed clindamycin by PCP, took first dose on morning of 6/28 (no subsequent doses taken since). He then developed full body itching that progressed to hives at nighttime.  Took 2 doses of Benadryl without relief.  Patient had persistent itching the following morning so took additional dose of Benadryl and wife noted redness spreading up the leg and was concerned, went back to PMD found to be hypotensive in office today 81/50 so EMS was called and patient brought to ED.  At this time patient only complaining of itching to total body.  Of note he did fall over the weekend with left hip and right knee trauma, reportedly had left hip x-rayed as outpatient which was normal, Denies pain at this time there.  Denies chest pain, shortness of breath, throat closing sensation, weakness, dizziness/lightheadedness, abdominal pain, nausea/vomiting. Afebrile on admission. Initial labs with WBC of 12.47 with left shift, creat 2.26 (has CKD). XRAY R Ankle with diffuse soft tissue swelling at the ankle. ID consulted for antibiotic management of the RLE cellulitis.     Discussed with wife at the bedside, she reports patient has taken amoxicillin in the past ( with benadryl) and was able to tolerate it. Denies any SOB, anaphylaxis or other concerning skin reaction to PCN in the past.     Seen by dermatology on 07/02- suspected Chronic venous stasis  - Low suspicion for infection at this time.         # RLE swelling and erythema - concern for Chronic venous stasis  # Itching   # leucocytosis - Improved   # multiple antibiotic allergy   # CKD  # elevated CRP ( 41)   # uncontrolled DM with hyperglycemia A1c: 10.8%     MICRO:   06/30 Bcx- NGTD    Recommendations:     - Appreciate derm recommendations   - Can monitor off antimicrobials at this time.   - Bcx with NGTD  - Monitor for allergic reaction   - Would benefit from allergy eval outpatient.         In addition to reviewing history, imaging, documents, labs, microbiology, took into account antibiotic stewardship, local antibiogram and infection control strategies and potential transmission issues.    Discussed with the primary team.  ID will sign off today. Thank you for the consultation. Please feel free to reach out with any questions or concerns.   Inpatient ID consult team will discontinue active follow-up for this patient.      Cecelia Li MD  Attending Physician, Division of Infectious Diseases  Department of Medicine   [facility]    Contact on TEAMS messaging from 9am - 5pm  Office: [phone] (after 5 PM or weekend)      
  65 yo male      h/o  CKD, T2DM on insulin, CHF   AICD,  (last EF 20% on echo from 2024), HTN, s/p L BKA  2024,        c/o   total body  itching, RLE wound and low BP in PMD office. .   cut his lower right shin about 1 week ago, was prescribed clindamycin by   pcp     then   developed full body itching that progressed to hives at nighttime.  Took 2 doses of Benadryl  ,   persistent itching this morning so took additional dose of Benadryl and wife noted redness spreading up the leg and was concerned, went back to PMD found to be hypotensive in office   81/50 so EMS was called and patient brought to er     he did fall over the weekend with left hip and right knee trauma, reportedly had left hip x-rayed as outpatient which was normal, denies pain at this time     denies chest pain, shortness of breath, throat closing sensation, weakness, dizziness/lightheadedness, abdominal pain, nausea/vomiting.    Rt leg cellulitis       ID following   po abx Derm consult called     Endo eval        CKD      DM,  hy[pergycemia,  endo  ramsey norris, on lantus       Cardiomyopathy   AICD        S-ICD extraction with EV-ICD implant 9/3 ([employer]). ramsey kinney,   and hypotensive event 9/4. TTE 9/4 demonstrates trace pericardial effusion.   s/p   pericardiocentesis..  2024  by  Ir         Cardioalec   3/2025      HTN      PAD.  s/p L eft  BKA    dvt  ppx/ poke  with  wife       /       
65 yo male PMHx CKD, T2DM on insulin, CHF s/p defibrillator/PPM, (last EF 20% on echo from 2024), HTN, s/p L BKA presents ED complaining of total body itching, RLE wound and low BP    Assessment  DMT2: 64y Male with DM T2 with hyperglycemia, A1C 10.3% , was on oral meds and high dose insulin at home, now on basal bolus insulin with coverage, blood sugars  are improving.   CHF: on medications, stable, monitored.  HTN: on antihypertensive medications, monitored, asymptomatic.  CKD: Monitor labs/BMP  Obesity: No strict exercise routines, not on any weight loss program, neither on low calorie diet.    Cortney Flanagan MD  Cell: [phone]  Office: [phone]

## 2025-07-03 NOTE — DISCHARGE NOTE PROVIDER - CARE PROVIDER_API CALL
Garrison Boykin  Internal Medicine  [facility] [facility], [place] [address]  Phone: [phone]  Fax: [phone]  Established Patient  Follow Up Time: 1 week   Garrison Boykin  Internal Medicine  [address], [address]  [place] [address]  Phone: [phone]  Fax: [phone]  Established Patient  Follow Up Time: 1 week    Shawn Barnes  Cardiovascular Disease  [address] [place] [address], [facility] [address]  [place] [address]  Phone: [phone]  Fax: [phone]  Established Patient  Follow Up Time: 2 weeks

## 2025-07-03 NOTE — DISCHARGE NOTE NURSING/CASE MANAGEMENT/SOCIAL WORK - FINANCIAL ASSISTANCE
[facility] provides services at a reduced cost to those who are determined to be eligible through [facility]’s financial assistance program. Information regarding [facility]’s financial assistance program can be found by going to [email or web address] or by calling [phone].

## 2025-07-03 NOTE — DISCHARGE NOTE NURSING/CASE MANAGEMENT/SOCIAL WORK - PATIENT PORTAL LINK FT
You can access the FollowMyHealth Patient Portal offered by [facility] by registering at the following website: [email or web address]. By joining [employer]’s FollowMyHealth portal, you will also be able to view your health information using other applications (apps) compatible with our system.

## 2025-07-03 NOTE — DISCHARGE NOTE PROVIDER - HOSPITAL COURSE
HPI:    63 yo male      h/o  CKD, T2DM on insulin, CHF   AICD,  (last EF 20% on echo from 2024), HTN, s/p L BKA       c/o   total body  itching, RLE wound and low BP in PMD office. .   cut his lower right shin about 1 week ago, was prescribed clindamycin by   pcp     then   developed full body itching that progressed to hives at nighttime.  Took 2 doses of Benadryl  ,   persistent itching this morning so took additional dose of Benadryl and wife noted redness spreading up the leg and was concerned, went back to PMD found to be hypotensive in office   81/50 so EMS was called and patient brought to er      he did fall over the weekend with left hip and right knee trauma, reportedly had left hip x-rayed as outpatient which was normal, denies pain at this time     denies chest pain, shortness of breath, throat closing sensation, weakness, dizziness/lightheadedness, abdominal pain, nausea/vomiting.   (01 Jul 2025 10:05)    Hospital Course:  Initial labs indicate a WBC of 12.47 with a left shift and creatinine of 2.26. X-ray of the right ankle shows diffuse soft tissue swelling; ID has been consulted for cellulitis management. Dermatology's recommendations are appreciated, suggesting monitoring off antimicrobials and noting no growth to date on blood cultures. An outpatient allergy evaluation is advised, and for chronic venous stasis, compression stockings and emollients such as Cerave or Cetaphil are recommended.    Important Medication Changes and Reason:  no abx     Active or Pending Issues Requiring Follow-up:  Allergist     Advanced Directives:   [ ] Full code  [ ] DNR  [ ] Hospice    Discharge Diagnoses:  Cellulitis          HPI:    65 yo male      h/o  CKD, T2DM on insulin, CHF   AICD,  (last EF 20% on echo from 2024), HTN, s/p L BKA       c/o   total body  itching, RLE wound and low BP in PMD office. .   cut his lower right shin about 1 week ago, was prescribed clindamycin by   pcp     then   developed full body itching that progressed to hives at nighttime.  Took 2 doses of Benadryl  ,   persistent itching this morning so took additional dose of Benadryl and wife noted redness spreading up the leg and was concerned, went back to PMD found to be hypotensive in office   81/50 so EMS was called and patient brought to er      he did fall over the weekend with left hip and right knee trauma, reportedly had left hip x-rayed as outpatient which was normal, denies pain at this time     denies chest pain, shortness of breath, throat closing sensation, weakness, dizziness/lightheadedness, abdominal pain, nausea/vomiting.   (01 Jul 2025 10:05)    Hospital Course:  Initial labs indicate a WBC of 12.47 with a left shift and creatinine of 2.26. X-ray of the right ankle shows diffuse soft tissue swelling; ID has been consulted for cellulitis management. Dermatology's recommendations are appreciated, suggesting monitoring off antimicrobials and noting no growth to date on blood cultures. An outpatient allergy evaluation is advised, and for chronic venous stasis, compression stockings and emollients such as Cerave or Cetaphil are recommended.    Important Medication Changes and Reason:  no abx     Active or Pending Issues Requiring Follow-up:  Allergist     Advanced Directives:   [x] Full code  [ ] DNR  [ ] Hospice    Discharge Diagnoses:  Cellulitis   DM

## 2025-07-03 NOTE — PROGRESS NOTE ADULT - SUBJECTIVE AND OBJECTIVE BOX
Patient is a 64y old  Male who presents with a chief complaint of right  leg  infection (01 Jul 2025 11:47)    Being followed by ID for RLE swelling and erythema     Interval history:  RLE erythema still present   no significant change   reports itching   BCX thus far negative   remains on cefazolin - tolerating it well     ROS:  No cough,SOB,CP  No N/V/D  No abd pain  No urinary complaints      Antimicrobials:  ceFAZolin   IVPB 1000 milliGRAM(s) IV Intermittent every 8 hours      Vital Signs Last 24 Hrs  T(C): 36.7 (07-02-25 @ 11:08), Max: 36.8 (07-01-25 @ 18:43)  T(F): 98.1 (07-02-25 @ 11:08), Max: 98.2 (07-01-25 @ 18:43)  HR: 79 (07-02-25 @ 11:08) (79 - 92)  BP: 117/78 (07-02-25 @ 11:08) (93/56 - 117/78)  BP(mean): --  RR: 18 (07-02-25 @ 11:08) (18 - 18)  SpO2: 97% (07-02-25 @ 11:08) (93% - 98%)    PHYSICAL EXAM:  Constitutional: non-toxic, no distress  HEAD/EYES: anicteric, no conjunctival injection  ENT:  supple, no thrush  Cardiovascular:   normal S1, S2, no murmur, no edema  Respiratory:  clear BS bilaterally, no wheezes, no rales  GI:  soft, non-tender, normal bowel sounds  Musculoskeletal: no synovitis, normal ROM  Neurologic: awake and alert, normal strength,  Skin:  + erythema to all extremities, RLE w/ 1 cm scabbed over wound to distal anterior tibia with surrounding erythema extending up leg to medial thigh. +chronic vascular skin changes noted to R shin. Left BKA +       Lab Data:                        11.8   9.30  )-----------( 152      ( 01 Jul 2025 01:50 )             39.0     07-01    136  |  97  |  52[H]  ----------------------------<  392[H]  3.4[L]   |  21[L]  |  2.39[H]    Ca    9.0      01 Jul 2025 01:50    TPro  7.8  /  Alb  4.0  /  TBili  0.9  /  DBili  x   /  AST  14  /  ALT  10  /  AlkPhos  130[H]  06-30      Blood Blood-Peripheral  06-30-25   No growth at 24 hours  --  --      Blood Blood-Peripheral  06-30-25   No growth at 24 hours  --  --                RADIOLOGY:  imaging below personally reviewed and agree with findings    ACC: [id number] EXAM:  XR FEMUR 2 VIEWS RT   ORDERED BY: GLADYS VELAZQUEZ     ACC: [id number] EXAM:  XR KNEE AP LAT OBL 3 VIEWS RT   ORDERED BY: GLADYS VELAZQUEZ     PROCEDURE DATE:  06/30/2025          INTERPRETATION:  HISTORY: R knee ecchymosis s/p fall    TECHNIQUE: 2 views of the right femur; 3 views of the right knee; 2 views   of the right tibia/fibula    COMPARISON: Knee radiographs dated 8/25/2024    IMPRESSION:    Right femur:    Evaluation of the lateral view of the right femur is limited due to   overlying objects.    No definite acute displaced fracture or dislocation. However, if there is   high clinical concern for fracture or if the patient is unable to bear   weight, MRI is a more sensitive test to evaluate for fracture.    Minimal right hip arthrosis. Vascular calcifications noted.    Right knee:    No acute displaced fracture or dislocation. Mild osteoarthritic changes   within the patellofemoral compartment. Small knee joint effusion.   Vascular calcifications noted.    Right tibia/fibula:    Cortical irregularity along the posterior aspect of the lateral malleolus   of the distal fibula with small mineralization along the distal tip of   lateral malleolus, which may be sequela of an old injury in this region   or be related to an acute displaced fracture/avulsion fracture of the of   the lateral malleolus of the ankle in this region. The right ankle is   suboptimally evaluated on the current exam. If there is clinical concern   for acute fracture or pathology within the lateral malleolus/right ankle,   recommend dedicated 3 view right ankle radiographs for better   characterization of this finding.    Vascular calcifications noted.      IMPRESSION: There is a 0.5 cm ossific body adjacent to the distal fibula   which has a corticated chronic appearance. Correlate for any point   tenderness in this location. There is diffuse soft tissue swelling at the   ankle. There is minimal spurring.      
Patient is a 64y old  Male who presents with a chief complaint of right  leg  infection (02 Jul 2025 15:31)      SUBJECTIVE / OVERNIGHT EVENTS: Events noted      MEDICATIONS  (STANDING):  albuterol    90 MICROgram(s) HFA Inhaler 2 Puff(s) Inhalation every 6 hours  aspirin  chewable 81 milliGRAM(s) Oral daily  bumetanide 4 milliGRAM(s) Oral daily  ceFAZolin   IVPB 1000 milliGRAM(s) IV Intermittent every 8 hours  clopidogrel Tablet 75 milliGRAM(s) Oral daily  dextrose 5%. 1000 milliLiter(s) (50 mL/Hr) IV Continuous <Continuous>  dextrose 5%. 1000 milliLiter(s) (100 mL/Hr) IV Continuous <Continuous>  dextrose 50% Injectable 25 Gram(s) IV Push once  dextrose 50% Injectable 12.5 Gram(s) IV Push once  dextrose 50% Injectable 25 Gram(s) IV Push once  glucagon  Injectable 1 milliGRAM(s) IntraMuscular once  heparin   Injectable 5000 Unit(s) SubCutaneous every 12 hours  insulin glargine Injectable (LANTUS) 40 Unit(s) SubCutaneous at bedtime  insulin lispro (ADMELOG) corrective regimen sliding scale   SubCutaneous at bedtime  insulin lispro (ADMELOG) corrective regimen sliding scale   SubCutaneous three times a day before meals  insulin lispro Injectable (ADMELOG) 14 Unit(s) SubCutaneous three times a day before meals  metoprolol succinate ER 25 milliGRAM(s) Oral daily  pantoprazole    Tablet 40 milliGRAM(s) Oral before breakfast  rosuvastatin 20 milliGRAM(s) Oral at bedtime  sacubitril 24 mG/valsartan 26 mG 1 Tablet(s) Oral two times a day  tamsulosin 0.4 milliGRAM(s) Oral at bedtime    MEDICATIONS  (PRN):  dextrose Oral Gel 15 Gram(s) Oral once PRN Blood Glucose LESS THAN 70 milliGRAM(s)/deciliter      CAPILLARY BLOOD GLUCOSE      POCT Blood Glucose.: 134 mg/dL (02 Jul 2025 22:58)  POCT Blood Glucose.: 112 mg/dL (02 Jul 2025 20:53)  POCT Blood Glucose.: 195 mg/dL (02 Jul 2025 16:54)  POCT Blood Glucose.: 258 mg/dL (02 Jul 2025 12:08)  POCT Blood Glucose.: 228 mg/dL (02 Jul 2025 08:06)    I&O's Summary    02 Jul 2025 07:01  -  02 Jul 2025 23:56  --------------------------------------------------------  IN: 1250 mL / OUT: 3300 mL / NET: -2050 mL      T(C): 36.7 (07-02-25 @ 23:02), Max: 36.7 (07-02-25 @ 19:10)  HR: 94 (07-02-25 @ 23:02) (87 - 94)  BP: 101/69 (07-02-25 @ 23:02) (92/63 - 110/77)  RR: 18 (07-02-25 @ 23:02) (18 - 18)  SpO2: 95% (07-02-25 @ 23:02) (95% - 96%)    PHYSICAL EXAM:  GENERAL: NAD, well-developed  HEAD:  Atraumatic, Normocephalic  EYES: EOMI, PERRLA, conjunctiva and sclera clear  NECK: Supple, No JVD  CHEST/LUNG: Clear to auscultation bilaterally; No wheeze  HEART: Regular rate and rhythm; No murmurs, rubs, or gallops  ABDOMEN: Soft, Nontender, Nondistended; Bowel sounds present  EXTREMITIES: RLE erythematous   PSYCH: AAOx3  NEUROLOGY: non-focal  SKIN: No rashes or lesions    LABS:                        11.8   9.30  )-----------( 152      ( 01 Jul 2025 01:50 )             39.0     07-01    136  |  97  |  52[H]  ----------------------------<  392[H]  3.4[L]   |  21[L]  |  2.39[H]    Ca    9.0      01 Jul 2025 01:50            Urinalysis Basic - ( 01 Jul 2025 01:50 )    Color: x / Appearance: x / SG: x / pH: x  Gluc: 392 mg/dL / Ketone: x  / Bili: x / Urobili: x   Blood: x / Protein: x / Nitrite: x   Leuk Esterase: x / RBC: x / WBC x   Sq Epi: x / Non Sq Epi: x / Bacteria: x        RADIOLOGY & ADDITIONAL TESTS:    Imaging Personally Reviewed:    Consultant(s) Notes Reviewed:      Care Discussed with Consultants/Other Providers:  
Patient is a 64y old  Male who presents with a chief complaint of right  leg  infection (02 Jul 2025 18:56)    Being followed by ID for RLE swelling and erythema     Interval history:  RLE erythema still present   seen by derm- concern for chronic venous stasis  reports itching has overall improved   BCX thus far negative     ROS:  No cough,SOB,CP  No N/V/D  No abd pain  No urinary complaints    Antimicrobials:  ceFAZolin   IVPB 1000 milliGRAM(s) IV Intermittent every 8 hours      Vital Signs Last 24 Hrs  T(C): 36.4 (07-03-25 @ 08:29), Max: 36.7 (07-02-25 @ 11:08)  T(F): 97.5 (07-03-25 @ 08:29), Max: 98.1 (07-02-25 @ 11:08)  HR: 90 (07-03-25 @ 08:29) (79 - 94)  BP: 105/68 (07-03-25 @ 08:29) (92/63 - 117/78)  BP(mean): --  RR: 18 (07-03-25 @ 08:29) (18 - 18)  SpO2: 95% (07-03-25 @ 08:29) (94% - 97%)    PHYSICAL EXAM:  Constitutional: non-toxic, no distress  HEAD/EYES: anicteric, no conjunctival injection  ENT:  supple, no thrush  Cardiovascular:   normal S1, S2, no murmur, no edema  Respiratory:  clear BS bilaterally, no wheezes, no rales  GI:  soft, non-tender, normal bowel sounds  Musculoskeletal: no synovitis, normal ROM  Neurologic: awake and alert, normal strength,  Skin:  + erythema to all extremities, RLE w/ 1 cm scabbed over wound to distal anterior tibia with surrounding erythema extending up leg to medial thigh. +chronic vascular skin changes noted to R shin. Left BKA +     Lab Data:                        11.2   8.59  )-----------( 139      ( 03 Jul 2025 06:53 )             38.4     07-03    140  |  103  |  66[H]  ----------------------------<  151[H]  3.4[L]   |  20[L]  |  2.15[H]    Ca    8.7      03 Jul 2025 06:53  Phos  3.9     07-03  Mg     2.3     07-03        Blood Blood-Peripheral  06-30-25   No growth at 48 Hours  --  --      Blood Blood-Peripheral  06-30-25   No growth at 48 Hours  --  --                  RADIOLOGY:  imaging below personally reviewed and agree with findings    ACC: [id number] EXAM:  XR FEMUR 2 VIEWS RT   ORDERED BY: GLADYS VELAZQUEZ     ACC: [id number] EXAM:  XR KNEE AP LAT OBL 3 VIEWS RT   ORDERED BY: GLADYS VELAZQUEZ     PROCEDURE DATE:  06/30/2025          INTERPRETATION:  HISTORY: R knee ecchymosis s/p fall    TECHNIQUE: 2 views of the right femur; 3 views of the right knee; 2 views   of the right tibia/fibula    COMPARISON: Knee radiographs dated 8/25/2024    IMPRESSION:    Right femur:    Evaluation of the lateral view of the right femur is limited due to   overlying objects.    No definite acute displaced fracture or dislocation. However, if there is   high clinical concern for fracture or if the patient is unable to bear   weight, MRI is a more sensitive test to evaluate for fracture.    Minimal right hip arthrosis. Vascular calcifications noted.    Right knee:    No acute displaced fracture or dislocation. Mild osteoarthritic changes   within the patellofemoral compartment. Small knee joint effusion.   Vascular calcifications noted.    Right tibia/fibula:    Cortical irregularity along the posterior aspect of the lateral malleolus   of the distal fibula with small mineralization along the distal tip of   lateral malleolus, which may be sequela of an old injury in this region   or be related to an acute displaced fracture/avulsion fracture of the of   the lateral malleolus of the ankle in this region. The right ankle is   suboptimally evaluated on the current exam. If there is clinical concern   for acute fracture or pathology within the lateral malleolus/right ankle,   recommend dedicated 3 view right ankle radiographs for better   characterization of this finding.    Vascular calcifications noted.      IMPRESSION: There is a 0.5 cm ossific body adjacent to the distal fibula   which has a corticated chronic appearance. Correlate for any point   tenderness in this location. There is diffuse soft tissue swelling at the   ankle. There is minimal spurring.    
    Chief complaint    Patient is a 64y old  Male who presents with a chief complaint of right  leg  infection (03 Jul 2025 10:48)   Review of systems  Patient appears comfortable.    Labs and Fingersticks  CAPILLARY BLOOD GLUCOSE      POCT Blood Glucose.: 179 mg/dL (03 Jul 2025 12:09)  POCT Blood Glucose.: 189 mg/dL (03 Jul 2025 08:08)  POCT Blood Glucose.: 134 mg/dL (02 Jul 2025 22:58)  POCT Blood Glucose.: 112 mg/dL (02 Jul 2025 20:53)      Anion Gap: 17 (07-03 @ 06:53)      Calcium: 8.7 (07-03 @ 06:53)          07-03    140  |  103  |  66[H]  ----------------------------<  151[H]  3.4[L]   |  20[L]  |  2.15[H]    Ca    8.7      03 Jul 2025 06:53  Phos  3.9     07-03  Mg     2.3     07-03                          11.2   8.59  )-----------( 139      ( 03 Jul 2025 06:53 )             38.4     Medications  MEDICATIONS  (STANDING):  albuterol    90 MICROgram(s) HFA Inhaler 2 Puff(s) Inhalation every 6 hours  aspirin  chewable 81 milliGRAM(s) Oral daily  bumetanide 4 milliGRAM(s) Oral daily  clopidogrel Tablet 75 milliGRAM(s) Oral daily  dextrose 5%. 1000 milliLiter(s) (50 mL/Hr) IV Continuous <Continuous>  dextrose 5%. 1000 milliLiter(s) (100 mL/Hr) IV Continuous <Continuous>  dextrose 50% Injectable 25 Gram(s) IV Push once  dextrose 50% Injectable 12.5 Gram(s) IV Push once  dextrose 50% Injectable 25 Gram(s) IV Push once  glucagon  Injectable 1 milliGRAM(s) IntraMuscular once  heparin   Injectable 5000 Unit(s) SubCutaneous every 12 hours  insulin glargine Injectable (LANTUS) 35 Unit(s) SubCutaneous at bedtime  insulin lispro (ADMELOG) corrective regimen sliding scale   SubCutaneous at bedtime  insulin lispro (ADMELOG) corrective regimen sliding scale   SubCutaneous three times a day before meals  insulin lispro Injectable (ADMELOG) 10 Unit(s) SubCutaneous three times a day before meals  metoprolol succinate ER 25 milliGRAM(s) Oral daily  pantoprazole    Tablet 40 milliGRAM(s) Oral before breakfast  rosuvastatin 20 milliGRAM(s) Oral at bedtime  sacubitril 24 mG/valsartan 26 mG 1 Tablet(s) Oral two times a day  tamsulosin 0.4 milliGRAM(s) Oral at bedtime      Physical Exam  General: Patient appears comfortable.  Vital Signs Last 12 Hrs  T(F): 97.5 (07-03-25 @ 08:29), Max: 97.5 (07-03-25 @ 08:29)  HR: 98 (07-03-25 @ 09:55) (90 - 98)  BP: 98/68 (07-03-25 @ 09:55) (98/68 - 105/68)  BP(mean): --  RR: 18 (07-03-25 @ 08:29) (18 - 18)  SpO2: 95% (07-03-25 @ 09:55) (95% - 95%)  Neck: No palpable thyroid nodules.  CVS: S1S2, No murmurs  Respiratory: No wheezing, no crepitations  GI: Abdomen soft, non tender.    Diagnostics        Radiology:

## 2025-07-03 NOTE — DISCHARGE NOTE NURSING/CASE MANAGEMENT/SOCIAL WORK - NSDCVIVACCINE_GEN_ALL_CORE_FT
influenza, injectable, quadrivalent, preservative free; 08-Feb-2018 11:10; Chely Patterson (RN); [employer]; [id number]; IntraMuscular; Deltoid Left.; 0.5 milliLiter(s); VIS (VIS Published: 07-Aug-2015, VIS Presented: 08-Feb-2018);   influenza, injectable, quadrivalent, preservative free; 21-Sep-2020 18:51; Yan Cazares (RN); [employer]; [id number] (Exp. Date: 30-Jun-2021); IntraMuscular; Deltoid Right.; 0.5 milliLiter(s); VIS (VIS Published: 15-Aug-2019, VIS Presented: 21-Sep-2020);

## 2025-07-03 NOTE — DISCHARGE NOTE NURSING/CASE MANAGEMENT/SOCIAL WORK - NSDCPEFALRISK_GEN_ALL_CORE
For information on Fall & Injury Prevention, visit: [email or web address] OR  [email or web address] OR  https://www.cdc.gov/steadi/patient.html

## 2025-07-03 NOTE — PHYSICAL THERAPY INITIAL EVALUATION ADULT - ADDITIONAL COMMENTS
Pt lives with wife and children in a private house with 5 JORJE, positive handrail and no steps to bedroom.

## 2025-07-03 NOTE — DISCHARGE NOTE PROVIDER - CARE PROVIDERS DIRECT ADDRESSES
[email or web address]

## 2025-07-03 NOTE — DISCHARGE NOTE PROVIDER - NSDCMRMEDTOKEN_GEN_ALL_CORE_FT
Albuterol (Eqv-ProAir HFA) 90 mcg/inh inhalation aerosol: 2 puff(s) inhaled every 6 hours as needed  aspirin 81 mg oral delayed release tablet: 1 tab(s) orally once a day in the morning  bumetanide 2 mg oral tablet: 1 tab(s) orally 2 times a day (in the morning and in the afternoon)  clopidogrel 75 mg oral tablet: 1 tab(s) orally once a day in the morning  Entresto 24 mg-26 mg oral tablet: 1 tab(s) orally 2 times a day  HumaLOG 100 units/mL injectable solution: 20 unit(s) subcutaneous 3 times a day (before meals)  Jardiance 10 mg oral tablet: 1 tab(s) orally once a day in the morning  Lantus 100 units/mL subcutaneous solution: 40 unit(s) subcutaneous once a day (at bedtime)  metoprolol succinate 25 mg oral capsule, extended release: 1 cap(s) orally once a day (at bedtime)  Outpatient Physical Therapy: Outpatient Physical Therapy  pantoprazole 40 mg oral delayed release tablet: 1 tab(s) orally once a day in the morning  rosuvastatin 10 mg oral tablet: 1 tab(s) orally once a day  spironolactone 25 mg oral tablet: 1 tab(s) orally once a day  tamsulosin 0.4 mg oral capsule: 1 cap(s) orally once a day (at bedtime)  Wixela Inhub 250 mcg-50 mcg/inh inhalation powder: 1 puff(s) inhaled 2 times a day   Albuterol (Eqv-ProAir HFA) 90 mcg/inh inhalation aerosol: 2 puff(s) inhaled every 6 hours as needed  aspirin 81 mg oral delayed release tablet: 1 tab(s) orally once a day in the morning  bumetanide 2 mg oral tablet: 1 tab(s) orally 2 times a day (in the morning and in the afternoon)  clopidogrel 75 mg oral tablet: 1 tab(s) orally once a day in the morning  Entresto 24 mg-26 mg oral tablet: 1 tab(s) orally 2 times a day  HumaLOG 100 units/mL injectable solution: 10 unit(s) subcutaneous 3 times a day (before meals)  Jardiance 10 mg oral tablet: 1 tab(s) orally once a day in the morning  Lantus 100 units/mL subcutaneous solution: 35 unit(s) subcutaneous once a day (at bedtime)  metoprolol succinate 25 mg oral capsule, extended release: 1 cap(s) orally once a day (at bedtime)  Outpatient Physical Therapy: Outpatient Physical Therapy  pantoprazole 40 mg oral delayed release tablet: 1 tab(s) orally once a day in the morning  rosuvastatin 20 mg oral tablet: 1 tab(s) orally once a day (at bedtime)  spironolactone 25 mg oral tablet: 1 tab(s) orally once a day  tamsulosin 0.4 mg oral capsule: 1 cap(s) orally once a day (at bedtime)  Wixela Inhub 250 mcg-50 mcg/inh inhalation powder: 1 puff(s) inhaled 2 times a day

## 2025-07-25 ENCOUNTER — APPOINTMENT (OUTPATIENT)
Dept: ELECTROPHYSIOLOGY | Facility: CLINIC | Age: 65
End: 2025-07-25
Payer: MEDICARE

## 2025-07-25 ENCOUNTER — NON-APPOINTMENT (OUTPATIENT)
Age: 65
End: 2025-07-25

## 2025-07-25 PROCEDURE — 93295 DEV INTERROG REMOTE 1/2/MLT: CPT

## 2025-07-25 PROCEDURE — 93296 REM INTERROG EVL PM/IDS: CPT

## 2025-08-03 ENCOUNTER — INPATIENT (INPATIENT)
Facility: HOSPITAL | Age: 65
LOS: 4 days | Discharge: HOME | DRG: 291 | End: 2025-08-08
Attending: INTERNAL MEDICINE | Admitting: INTERNAL MEDICINE
Payer: MEDICARE

## 2025-08-03 VITALS
HEART RATE: 94 BPM | OXYGEN SATURATION: 100 % | TEMPERATURE: 98 F | DIASTOLIC BLOOD PRESSURE: 69 MMHG | RESPIRATION RATE: 24 BRPM | HEIGHT: 77 IN | SYSTOLIC BLOOD PRESSURE: 103 MMHG

## 2025-08-03 DIAGNOSIS — I50.9 HEART FAILURE, UNSPECIFIED: ICD-10-CM

## 2025-08-03 DIAGNOSIS — Z98.52 VASECTOMY STATUS: Chronic | ICD-10-CM

## 2025-08-03 LAB
ALBUMIN SERPL ELPH-MCNC: 3.9 G/DL — SIGNIFICANT CHANGE UP (ref 3.3–5)
ALP SERPL-CCNC: 161 U/L — HIGH (ref 40–120)
ALT FLD-CCNC: 13 U/L — SIGNIFICANT CHANGE UP (ref 10–45)
ANION GAP SERPL CALC-SCNC: 13 MMOL/L — SIGNIFICANT CHANGE UP (ref 5–17)
APTT BLD: 31.2 SEC — SIGNIFICANT CHANGE UP (ref 26.1–36.8)
AST SERPL-CCNC: 14 U/L — SIGNIFICANT CHANGE UP (ref 10–40)
BASOPHILS # BLD AUTO: 0.04 K/UL — SIGNIFICANT CHANGE UP (ref 0–0.2)
BASOPHILS NFR BLD AUTO: 0.6 % — SIGNIFICANT CHANGE UP (ref 0–2)
BILIRUB SERPL-MCNC: 0.8 MG/DL — SIGNIFICANT CHANGE UP (ref 0.2–1.2)
BUN SERPL-MCNC: 47 MG/DL — HIGH (ref 7–23)
CALCIUM SERPL-MCNC: 8.9 MG/DL — SIGNIFICANT CHANGE UP (ref 8.4–10.5)
CHLORIDE SERPL-SCNC: 105 MMOL/L — SIGNIFICANT CHANGE UP (ref 96–108)
CO2 SERPL-SCNC: 23 MMOL/L — SIGNIFICANT CHANGE UP (ref 22–31)
CREAT SERPL-MCNC: 2.02 MG/DL — HIGH (ref 0.5–1.3)
EGFR: 37 ML/MIN/1.73M2 — LOW
EGFR: 37 ML/MIN/1.73M2 — LOW
EOSINOPHIL # BLD AUTO: 0.2 K/UL — SIGNIFICANT CHANGE UP (ref 0–0.5)
EOSINOPHIL NFR BLD AUTO: 2.8 % — SIGNIFICANT CHANGE UP (ref 0–6)
GAS PNL BLDV: SIGNIFICANT CHANGE UP
GLUCOSE SERPL-MCNC: 68 MG/DL — LOW (ref 70–99)
HCT VFR BLD CALC: 37.5 % — LOW (ref 39–50)
HGB BLD-MCNC: 11 G/DL — LOW (ref 13–17)
IMM GRANULOCYTES # BLD AUTO: 0.07 K/UL — SIGNIFICANT CHANGE UP (ref 0–0.07)
IMM GRANULOCYTES NFR BLD AUTO: 1 % — HIGH (ref 0–0.9)
INR BLD: 1.42 RATIO — HIGH (ref 0.85–1.16)
LYMPHOCYTES # BLD AUTO: 0.72 K/UL — LOW (ref 1–3.3)
LYMPHOCYTES NFR BLD AUTO: 10 % — LOW (ref 13–44)
MCHC RBC-ENTMCNC: 22.5 PG — LOW (ref 27–34)
MCHC RBC-ENTMCNC: 29.3 G/DL — LOW (ref 32–36)
MCV RBC AUTO: 76.8 FL — LOW (ref 80–100)
MONOCYTES # BLD AUTO: 0.86 K/UL — SIGNIFICANT CHANGE UP (ref 0–0.9)
MONOCYTES NFR BLD AUTO: 12 % — SIGNIFICANT CHANGE UP (ref 2–14)
NEUTROPHILS # BLD AUTO: 5.29 K/UL — SIGNIFICANT CHANGE UP (ref 1.8–7.4)
NEUTROPHILS NFR BLD AUTO: 73.6 % — SIGNIFICANT CHANGE UP (ref 43–77)
NRBC # BLD AUTO: 0 K/UL — SIGNIFICANT CHANGE UP (ref 0–0)
NRBC # FLD: 0 K/UL — SIGNIFICANT CHANGE UP (ref 0–0)
NRBC BLD AUTO-RTO: 0 /100 WBCS — SIGNIFICANT CHANGE UP (ref 0–0)
NT-PROBNP SERPL-SCNC: 3122 PG/ML — HIGH (ref 0–300)
PLATELET # BLD AUTO: 137 K/UL — LOW (ref 150–400)
PMV BLD: 11.2 FL — SIGNIFICANT CHANGE UP (ref 7–13)
POTASSIUM SERPL-MCNC: 3.7 MMOL/L — SIGNIFICANT CHANGE UP (ref 3.5–5.3)
POTASSIUM SERPL-SCNC: 3.7 MMOL/L — SIGNIFICANT CHANGE UP (ref 3.5–5.3)
PROT SERPL-MCNC: 7.3 G/DL — SIGNIFICANT CHANGE UP (ref 6–8.3)
PROTHROM AB SERPL-ACNC: 16.3 SEC — HIGH (ref 9.9–13.4)
RBC # BLD: 4.88 M/UL — SIGNIFICANT CHANGE UP (ref 4.2–5.8)
RBC # FLD: 17.5 % — HIGH (ref 10.3–14.5)
SODIUM SERPL-SCNC: 141 MMOL/L — SIGNIFICANT CHANGE UP (ref 135–145)
TROPONIN T, HIGH SENSITIVITY RESULT: 65 NG/L — HIGH (ref 0–51)
TROPONIN T, HIGH SENSITIVITY RESULT: 66 NG/L — HIGH (ref 0–51)
WBC # BLD: 7.18 K/UL — SIGNIFICANT CHANGE UP (ref 3.8–10.5)
WBC # FLD AUTO: 7.18 K/UL — SIGNIFICANT CHANGE UP (ref 3.8–10.5)

## 2025-08-03 PROCEDURE — 82435 ASSAY OF BLOOD CHLORIDE: CPT

## 2025-08-03 PROCEDURE — 83880 ASSAY OF NATRIURETIC PEPTIDE: CPT

## 2025-08-03 PROCEDURE — 99285 EMERGENCY DEPT VISIT HI MDM: CPT | Mod: GC

## 2025-08-03 PROCEDURE — 94660 CPAP INITIATION&MGMT: CPT

## 2025-08-03 PROCEDURE — 85025 COMPLETE CBC W/AUTO DIFF WBC: CPT

## 2025-08-03 PROCEDURE — 85014 HEMATOCRIT: CPT

## 2025-08-03 PROCEDURE — 71045 X-RAY EXAM CHEST 1 VIEW: CPT | Mod: 26

## 2025-08-03 PROCEDURE — 85610 PROTHROMBIN TIME: CPT

## 2025-08-03 PROCEDURE — 84484 ASSAY OF TROPONIN QUANT: CPT

## 2025-08-03 PROCEDURE — 83605 ASSAY OF LACTIC ACID: CPT

## 2025-08-03 PROCEDURE — 85018 HEMOGLOBIN: CPT

## 2025-08-03 PROCEDURE — 84295 ASSAY OF SERUM SODIUM: CPT

## 2025-08-03 PROCEDURE — 80053 COMPREHEN METABOLIC PANEL: CPT

## 2025-08-03 PROCEDURE — 82330 ASSAY OF CALCIUM: CPT

## 2025-08-03 PROCEDURE — 82803 BLOOD GASES ANY COMBINATION: CPT

## 2025-08-03 PROCEDURE — 71045 X-RAY EXAM CHEST 1 VIEW: CPT

## 2025-08-03 PROCEDURE — 85730 THROMBOPLASTIN TIME PARTIAL: CPT

## 2025-08-03 PROCEDURE — 93010 ELECTROCARDIOGRAM REPORT: CPT

## 2025-08-03 PROCEDURE — 82947 ASSAY GLUCOSE BLOOD QUANT: CPT

## 2025-08-03 PROCEDURE — 84132 ASSAY OF SERUM POTASSIUM: CPT

## 2025-08-03 RX ORDER — BUMETANIDE 1 MG/1
2 TABLET ORAL ONCE
Refills: 0 | Status: COMPLETED | OUTPATIENT
Start: 2025-08-03 | End: 2025-08-03

## 2025-08-03 RX ORDER — LORAZEPAM 4 MG/ML
1 VIAL (ML) INJECTION ONCE
Refills: 0 | Status: DISCONTINUED | OUTPATIENT
Start: 2025-08-03 | End: 2025-08-03

## 2025-08-03 RX ADMIN — Medication 1 MILLIGRAM(S): at 21:34

## 2025-08-03 RX ADMIN — BUMETANIDE 2 MILLIGRAM(S): 1 TABLET ORAL at 21:36

## 2025-08-04 DIAGNOSIS — I50.23 ACUTE ON CHRONIC SYSTOLIC (CONGESTIVE) HEART FAILURE: ICD-10-CM

## 2025-08-04 DIAGNOSIS — E11.9 TYPE 2 DIABETES MELLITUS WITHOUT COMPLICATIONS: ICD-10-CM

## 2025-08-04 DIAGNOSIS — I25.10 ATHEROSCLEROTIC HEART DISEASE OF NATIVE CORONARY ARTERY WITHOUT ANGINA PECTORIS: ICD-10-CM

## 2025-08-04 DIAGNOSIS — J96.01 ACUTE RESPIRATORY FAILURE WITH HYPOXIA: ICD-10-CM

## 2025-08-04 DIAGNOSIS — Z87.438 PERSONAL HISTORY OF OTHER DISEASES OF MALE GENITAL ORGANS: ICD-10-CM

## 2025-08-04 DIAGNOSIS — Z29.9 ENCOUNTER FOR PROPHYLACTIC MEASURES, UNSPECIFIED: ICD-10-CM

## 2025-08-04 LAB
FLUAV AG NPH QL: SIGNIFICANT CHANGE UP
FLUBV AG NPH QL: SIGNIFICANT CHANGE UP
GLUCOSE BLDC GLUCOMTR-MCNC: 128 MG/DL — HIGH (ref 70–99)
GLUCOSE BLDC GLUCOMTR-MCNC: 134 MG/DL — HIGH (ref 70–99)
GLUCOSE BLDC GLUCOMTR-MCNC: 143 MG/DL — HIGH (ref 70–99)
RSV RNA NPH QL NAA+NON-PROBE: SIGNIFICANT CHANGE UP
SARS-COV-2 RNA SPEC QL NAA+PROBE: SIGNIFICANT CHANGE UP
SOURCE RESPIRATORY: SIGNIFICANT CHANGE UP

## 2025-08-04 PROCEDURE — 99232 SBSQ HOSP IP/OBS MODERATE 35: CPT

## 2025-08-04 PROCEDURE — 82330 ASSAY OF CALCIUM: CPT

## 2025-08-04 PROCEDURE — 85018 HEMOGLOBIN: CPT

## 2025-08-04 PROCEDURE — 80053 COMPREHEN METABOLIC PANEL: CPT

## 2025-08-04 PROCEDURE — 85730 THROMBOPLASTIN TIME PARTIAL: CPT

## 2025-08-04 PROCEDURE — 82803 BLOOD GASES ANY COMBINATION: CPT

## 2025-08-04 PROCEDURE — 82962 GLUCOSE BLOOD TEST: CPT

## 2025-08-04 PROCEDURE — 85014 HEMATOCRIT: CPT

## 2025-08-04 PROCEDURE — 94660 CPAP INITIATION&MGMT: CPT

## 2025-08-04 PROCEDURE — 84295 ASSAY OF SERUM SODIUM: CPT

## 2025-08-04 PROCEDURE — 85025 COMPLETE CBC W/AUTO DIFF WBC: CPT

## 2025-08-04 PROCEDURE — 71045 X-RAY EXAM CHEST 1 VIEW: CPT

## 2025-08-04 PROCEDURE — 85610 PROTHROMBIN TIME: CPT

## 2025-08-04 PROCEDURE — 71250 CT THORAX DX C-: CPT

## 2025-08-04 PROCEDURE — 99223 1ST HOSP IP/OBS HIGH 75: CPT

## 2025-08-04 PROCEDURE — 82947 ASSAY GLUCOSE BLOOD QUANT: CPT

## 2025-08-04 PROCEDURE — 71250 CT THORAX DX C-: CPT | Mod: 26

## 2025-08-04 PROCEDURE — 84484 ASSAY OF TROPONIN QUANT: CPT

## 2025-08-04 PROCEDURE — 87637 SARSCOV2&INF A&B&RSV AMP PRB: CPT

## 2025-08-04 PROCEDURE — 84132 ASSAY OF SERUM POTASSIUM: CPT

## 2025-08-04 PROCEDURE — 82435 ASSAY OF BLOOD CHLORIDE: CPT

## 2025-08-04 PROCEDURE — 83605 ASSAY OF LACTIC ACID: CPT

## 2025-08-04 PROCEDURE — 83880 ASSAY OF NATRIURETIC PEPTIDE: CPT

## 2025-08-04 RX ORDER — DEXTROSE 50 % IN WATER 50 %
12.5 SYRINGE (ML) INTRAVENOUS ONCE
Refills: 0 | Status: DISCONTINUED | OUTPATIENT
Start: 2025-08-04 | End: 2025-08-08

## 2025-08-04 RX ORDER — ONDANSETRON HCL/PF 4 MG/2 ML
4 VIAL (ML) INJECTION EVERY 8 HOURS
Refills: 0 | Status: DISCONTINUED | OUTPATIENT
Start: 2025-08-04 | End: 2025-08-08

## 2025-08-04 RX ORDER — METOPROLOL SUCCINATE 50 MG/1
1 TABLET, EXTENDED RELEASE ORAL
Refills: 0 | DISCHARGE

## 2025-08-04 RX ORDER — SODIUM CHLORIDE 9 G/1000ML
1000 INJECTION, SOLUTION INTRAVENOUS
Refills: 0 | Status: DISCONTINUED | OUTPATIENT
Start: 2025-08-04 | End: 2025-08-08

## 2025-08-04 RX ORDER — ROSUVASTATIN CALCIUM 20 MG/1
20 TABLET, FILM COATED ORAL AT BEDTIME
Refills: 0 | Status: DISCONTINUED | OUTPATIENT
Start: 2025-08-04 | End: 2025-08-08

## 2025-08-04 RX ORDER — CYANOCOBALAMIN 1000 UG/ML
1 INJECTION INTRAMUSCULAR; SUBCUTANEOUS
Refills: 0 | DISCHARGE

## 2025-08-04 RX ORDER — MELATONIN 5 MG
3 TABLET ORAL AT BEDTIME
Refills: 0 | Status: DISCONTINUED | OUTPATIENT
Start: 2025-08-04 | End: 2025-08-08

## 2025-08-04 RX ORDER — TAMSULOSIN HYDROCHLORIDE 0.4 MG/1
0.4 CAPSULE ORAL AT BEDTIME
Refills: 0 | Status: DISCONTINUED | OUTPATIENT
Start: 2025-08-04 | End: 2025-08-08

## 2025-08-04 RX ORDER — ALPRAZOLAM 0.5 MG
0.25 TABLET, EXTENDED RELEASE 24 HR ORAL ONCE
Refills: 0 | Status: DISCONTINUED | OUTPATIENT
Start: 2025-08-04 | End: 2025-08-04

## 2025-08-04 RX ORDER — INSULIN LISPRO 100 U/ML
INJECTION, SOLUTION INTRAVENOUS; SUBCUTANEOUS
Refills: 0 | Status: DISCONTINUED | OUTPATIENT
Start: 2025-08-04 | End: 2025-08-08

## 2025-08-04 RX ORDER — ACETAMINOPHEN 500 MG/5ML
650 LIQUID (ML) ORAL EVERY 6 HOURS
Refills: 0 | Status: DISCONTINUED | OUTPATIENT
Start: 2025-08-04 | End: 2025-08-08

## 2025-08-04 RX ORDER — LIDOCAINE HYDROCHLORIDE 20 MG/ML
1 JELLY TOPICAL ONCE
Refills: 0 | Status: DISCONTINUED | OUTPATIENT
Start: 2025-08-04 | End: 2025-08-04

## 2025-08-04 RX ORDER — DEXTROSE 50 % IN WATER 50 %
25 SYRINGE (ML) INTRAVENOUS ONCE
Refills: 0 | Status: DISCONTINUED | OUTPATIENT
Start: 2025-08-04 | End: 2025-08-08

## 2025-08-04 RX ORDER — DEXTROSE 50 % IN WATER 50 %
15 SYRINGE (ML) INTRAVENOUS ONCE
Refills: 0 | Status: DISCONTINUED | OUTPATIENT
Start: 2025-08-04 | End: 2025-08-08

## 2025-08-04 RX ORDER — GLUCAGON 3 MG/1
1 POWDER NASAL ONCE
Refills: 0 | Status: DISCONTINUED | OUTPATIENT
Start: 2025-08-04 | End: 2025-08-08

## 2025-08-04 RX ORDER — ASPIRIN 325 MG
81 TABLET ORAL DAILY
Refills: 0 | Status: DISCONTINUED | OUTPATIENT
Start: 2025-08-04 | End: 2025-08-08

## 2025-08-04 RX ORDER — METOPROLOL SUCCINATE 50 MG/1
25 TABLET, EXTENDED RELEASE ORAL DAILY
Refills: 0 | Status: DISCONTINUED | OUTPATIENT
Start: 2025-08-04 | End: 2025-08-08

## 2025-08-04 RX ORDER — BUMETANIDE 1 MG/1
5 TABLET ORAL EVERY 12 HOURS
Refills: 0 | Status: DISCONTINUED | OUTPATIENT
Start: 2025-08-04 | End: 2025-08-04

## 2025-08-04 RX ORDER — ALPRAZOLAM 0.5 MG
0.25 TABLET, EXTENDED RELEASE 24 HR ORAL ONCE
Refills: 0 | Status: DISCONTINUED | OUTPATIENT
Start: 2025-08-04 | End: 2025-08-05

## 2025-08-04 RX ORDER — BUMETANIDE 1 MG/1
4 TABLET ORAL
Refills: 0 | Status: DISCONTINUED | OUTPATIENT
Start: 2025-08-04 | End: 2025-08-08

## 2025-08-04 RX ORDER — ALBUTEROL SULFATE 2.5 MG/3ML
2 VIAL, NEBULIZER (ML) INHALATION EVERY 6 HOURS
Refills: 0 | Status: DISCONTINUED | OUTPATIENT
Start: 2025-08-04 | End: 2025-08-08

## 2025-08-04 RX ORDER — INSULIN GLARGINE-YFGN 100 [IU]/ML
30 INJECTION, SOLUTION SUBCUTANEOUS AT BEDTIME
Refills: 0 | Status: DISCONTINUED | OUTPATIENT
Start: 2025-08-04 | End: 2025-08-08

## 2025-08-04 RX ORDER — BUMETANIDE 1 MG/1
5 TABLET ORAL
Refills: 0 | Status: DISCONTINUED | OUTPATIENT
Start: 2025-08-04 | End: 2025-08-04

## 2025-08-04 RX ORDER — MAGNESIUM, ALUMINUM HYDROXIDE 200-200 MG
30 TABLET,CHEWABLE ORAL EVERY 4 HOURS
Refills: 0 | Status: DISCONTINUED | OUTPATIENT
Start: 2025-08-04 | End: 2025-08-08

## 2025-08-04 RX ORDER — INSULIN LISPRO 100 U/ML
10 INJECTION, SOLUTION INTRAVENOUS; SUBCUTANEOUS
Refills: 0 | Status: DISCONTINUED | OUTPATIENT
Start: 2025-08-04 | End: 2025-08-08

## 2025-08-04 RX ORDER — HEPARIN SODIUM 1000 [USP'U]/ML
5000 INJECTION INTRAVENOUS; SUBCUTANEOUS EVERY 12 HOURS
Refills: 0 | Status: DISCONTINUED | OUTPATIENT
Start: 2025-08-04 | End: 2025-08-08

## 2025-08-04 RX ADMIN — Medication 0.25 MILLIGRAM(S): at 02:58

## 2025-08-04 RX ADMIN — METOPROLOL SUCCINATE 25 MILLIGRAM(S): 50 TABLET, EXTENDED RELEASE ORAL at 11:22

## 2025-08-04 RX ADMIN — TAMSULOSIN HYDROCHLORIDE 0.4 MILLIGRAM(S): 0.4 CAPSULE ORAL at 21:51

## 2025-08-04 RX ADMIN — INSULIN LISPRO 10 UNIT(S): 100 INJECTION, SOLUTION INTRAVENOUS; SUBCUTANEOUS at 17:49

## 2025-08-04 RX ADMIN — INSULIN LISPRO 10 UNIT(S): 100 INJECTION, SOLUTION INTRAVENOUS; SUBCUTANEOUS at 11:22

## 2025-08-04 RX ADMIN — ROSUVASTATIN CALCIUM 20 MILLIGRAM(S): 20 TABLET, FILM COATED ORAL at 21:49

## 2025-08-04 RX ADMIN — BUMETANIDE 132 MILLIGRAM(S): 1 TABLET ORAL at 15:46

## 2025-08-04 RX ADMIN — INSULIN GLARGINE-YFGN 30 UNIT(S): 100 INJECTION, SOLUTION SUBCUTANEOUS at 21:31

## 2025-08-04 RX ADMIN — BUMETANIDE 132 MILLIGRAM(S): 1 TABLET ORAL at 10:43

## 2025-08-04 RX ADMIN — Medication 81 MILLIGRAM(S): at 11:22

## 2025-08-04 RX ADMIN — Medication 3 MILLIGRAM(S): at 21:53

## 2025-08-04 RX ADMIN — HEPARIN SODIUM 5000 UNIT(S): 1000 INJECTION INTRAVENOUS; SUBCUTANEOUS at 17:59

## 2025-08-05 ENCOUNTER — RESULT REVIEW (OUTPATIENT)
Age: 65
End: 2025-08-05

## 2025-08-05 DIAGNOSIS — J44.9 CHRONIC OBSTRUCTIVE PULMONARY DISEASE, UNSPECIFIED: ICD-10-CM

## 2025-08-05 DIAGNOSIS — J90 PLEURAL EFFUSION, NOT ELSEWHERE CLASSIFIED: ICD-10-CM

## 2025-08-05 LAB
ANION GAP SERPL CALC-SCNC: 13 MMOL/L — SIGNIFICANT CHANGE UP (ref 5–17)
BUN SERPL-MCNC: 42 MG/DL — HIGH (ref 7–23)
CALCIUM SERPL-MCNC: 8.8 MG/DL — SIGNIFICANT CHANGE UP (ref 8.4–10.5)
CHLORIDE SERPL-SCNC: 103 MMOL/L — SIGNIFICANT CHANGE UP (ref 96–108)
CO2 SERPL-SCNC: 23 MMOL/L — SIGNIFICANT CHANGE UP (ref 22–31)
CREAT SERPL-MCNC: 1.8 MG/DL — HIGH (ref 0.5–1.3)
EGFR: 42 ML/MIN/1.73M2 — LOW
EGFR: 42 ML/MIN/1.73M2 — LOW
GLUCOSE BLDC GLUCOMTR-MCNC: 131 MG/DL — HIGH (ref 70–99)
GLUCOSE BLDC GLUCOMTR-MCNC: 146 MG/DL — HIGH (ref 70–99)
GLUCOSE BLDC GLUCOMTR-MCNC: 153 MG/DL — HIGH (ref 70–99)
GLUCOSE BLDC GLUCOMTR-MCNC: 204 MG/DL — HIGH (ref 70–99)
GLUCOSE BLDC GLUCOMTR-MCNC: 218 MG/DL — HIGH (ref 70–99)
GLUCOSE SERPL-MCNC: 149 MG/DL — HIGH (ref 70–99)
HCT VFR BLD CALC: 34.9 % — LOW (ref 39–50)
HGB BLD-MCNC: 10.3 G/DL — LOW (ref 13–17)
MAGNESIUM SERPL-MCNC: 2.2 MG/DL — SIGNIFICANT CHANGE UP (ref 1.6–2.6)
MCHC RBC-ENTMCNC: 22.2 PG — LOW (ref 27–34)
MCHC RBC-ENTMCNC: 29.5 G/DL — LOW (ref 32–36)
MCV RBC AUTO: 75.2 FL — LOW (ref 80–100)
NRBC # BLD AUTO: 0 K/UL — SIGNIFICANT CHANGE UP (ref 0–0)
NRBC # FLD: 0 K/UL — SIGNIFICANT CHANGE UP (ref 0–0)
NRBC BLD AUTO-RTO: 0 /100 WBCS — SIGNIFICANT CHANGE UP (ref 0–0)
PHOSPHATE SERPL-MCNC: 2.6 MG/DL — SIGNIFICANT CHANGE UP (ref 2.5–4.5)
PLATELET # BLD AUTO: 116 K/UL — LOW (ref 150–400)
PMV BLD: 10.8 FL — SIGNIFICANT CHANGE UP (ref 7–13)
POTASSIUM SERPL-MCNC: 3.7 MMOL/L — SIGNIFICANT CHANGE UP (ref 3.5–5.3)
POTASSIUM SERPL-SCNC: 3.7 MMOL/L — SIGNIFICANT CHANGE UP (ref 3.5–5.3)
RBC # BLD: 4.64 M/UL — SIGNIFICANT CHANGE UP (ref 4.2–5.8)
RBC # FLD: 17.3 % — HIGH (ref 10.3–14.5)
SODIUM SERPL-SCNC: 139 MMOL/L — SIGNIFICANT CHANGE UP (ref 135–145)
TROPONIN T, HIGH SENSITIVITY RESULT: 69 NG/L — HIGH (ref 0–51)
WBC # BLD: 5.69 K/UL — SIGNIFICANT CHANGE UP (ref 3.8–10.5)
WBC # FLD AUTO: 5.69 K/UL — SIGNIFICANT CHANGE UP (ref 3.8–10.5)

## 2025-08-05 PROCEDURE — 84484 ASSAY OF TROPONIN QUANT: CPT

## 2025-08-05 PROCEDURE — 80053 COMPREHEN METABOLIC PANEL: CPT

## 2025-08-05 PROCEDURE — 83880 ASSAY OF NATRIURETIC PEPTIDE: CPT

## 2025-08-05 PROCEDURE — 85730 THROMBOPLASTIN TIME PARTIAL: CPT

## 2025-08-05 PROCEDURE — 82803 BLOOD GASES ANY COMBINATION: CPT

## 2025-08-05 PROCEDURE — 82330 ASSAY OF CALCIUM: CPT

## 2025-08-05 PROCEDURE — 87637 SARSCOV2&INF A&B&RSV AMP PRB: CPT

## 2025-08-05 PROCEDURE — 71250 CT THORAX DX C-: CPT

## 2025-08-05 PROCEDURE — 94640 AIRWAY INHALATION TREATMENT: CPT

## 2025-08-05 PROCEDURE — 82947 ASSAY GLUCOSE BLOOD QUANT: CPT

## 2025-08-05 PROCEDURE — 83605 ASSAY OF LACTIC ACID: CPT

## 2025-08-05 PROCEDURE — 85027 COMPLETE CBC AUTOMATED: CPT

## 2025-08-05 PROCEDURE — 85014 HEMATOCRIT: CPT

## 2025-08-05 PROCEDURE — 71045 X-RAY EXAM CHEST 1 VIEW: CPT

## 2025-08-05 PROCEDURE — 85025 COMPLETE CBC W/AUTO DIFF WBC: CPT

## 2025-08-05 PROCEDURE — 85610 PROTHROMBIN TIME: CPT

## 2025-08-05 PROCEDURE — 85018 HEMOGLOBIN: CPT

## 2025-08-05 PROCEDURE — 82435 ASSAY OF BLOOD CHLORIDE: CPT

## 2025-08-05 PROCEDURE — 84295 ASSAY OF SERUM SODIUM: CPT

## 2025-08-05 PROCEDURE — 82962 GLUCOSE BLOOD TEST: CPT

## 2025-08-05 PROCEDURE — 84132 ASSAY OF SERUM POTASSIUM: CPT

## 2025-08-05 PROCEDURE — 94660 CPAP INITIATION&MGMT: CPT

## 2025-08-05 PROCEDURE — 80048 BASIC METABOLIC PNL TOTAL CA: CPT

## 2025-08-05 PROCEDURE — 93005 ELECTROCARDIOGRAM TRACING: CPT

## 2025-08-05 PROCEDURE — 84100 ASSAY OF PHOSPHORUS: CPT

## 2025-08-05 PROCEDURE — 93306 TTE W/DOPPLER COMPLETE: CPT | Mod: 26

## 2025-08-05 PROCEDURE — 83735 ASSAY OF MAGNESIUM: CPT

## 2025-08-05 RX ORDER — SACUBITRIL AND VALSARTAN 6; 6 MG/1; MG/1
1 PELLET ORAL
Refills: 0 | Status: DISCONTINUED | OUTPATIENT
Start: 2025-08-06 | End: 2025-08-08

## 2025-08-05 RX ORDER — SPIRONOLACTONE 25 MG
25 TABLET ORAL DAILY
Refills: 0 | Status: DISCONTINUED | OUTPATIENT
Start: 2025-08-06 | End: 2025-08-08

## 2025-08-05 RX ADMIN — Medication 1 DOSE(S): at 18:00

## 2025-08-05 RX ADMIN — METOPROLOL SUCCINATE 25 MILLIGRAM(S): 50 TABLET, EXTENDED RELEASE ORAL at 05:22

## 2025-08-05 RX ADMIN — TAMSULOSIN HYDROCHLORIDE 0.4 MILLIGRAM(S): 0.4 CAPSULE ORAL at 21:50

## 2025-08-05 RX ADMIN — INSULIN LISPRO 1: 100 INJECTION, SOLUTION INTRAVENOUS; SUBCUTANEOUS at 07:53

## 2025-08-05 RX ADMIN — INSULIN GLARGINE-YFGN 30 UNIT(S): 100 INJECTION, SOLUTION SUBCUTANEOUS at 21:42

## 2025-08-05 RX ADMIN — HEPARIN SODIUM 5000 UNIT(S): 1000 INJECTION INTRAVENOUS; SUBCUTANEOUS at 05:21

## 2025-08-05 RX ADMIN — Medication 0.25 MILLIGRAM(S): at 00:40

## 2025-08-05 RX ADMIN — BUMETANIDE 132 MILLIGRAM(S): 1 TABLET ORAL at 13:19

## 2025-08-05 RX ADMIN — INSULIN LISPRO 2: 100 INJECTION, SOLUTION INTRAVENOUS; SUBCUTANEOUS at 11:47

## 2025-08-05 RX ADMIN — HEPARIN SODIUM 5000 UNIT(S): 1000 INJECTION INTRAVENOUS; SUBCUTANEOUS at 18:00

## 2025-08-05 RX ADMIN — ROSUVASTATIN CALCIUM 20 MILLIGRAM(S): 20 TABLET, FILM COATED ORAL at 21:51

## 2025-08-05 RX ADMIN — Medication 81 MILLIGRAM(S): at 11:47

## 2025-08-05 RX ADMIN — INSULIN LISPRO 10 UNIT(S): 100 INJECTION, SOLUTION INTRAVENOUS; SUBCUTANEOUS at 07:53

## 2025-08-05 RX ADMIN — INSULIN LISPRO 10 UNIT(S): 100 INJECTION, SOLUTION INTRAVENOUS; SUBCUTANEOUS at 11:47

## 2025-08-05 RX ADMIN — BUMETANIDE 132 MILLIGRAM(S): 1 TABLET ORAL at 05:22

## 2025-08-05 RX ADMIN — Medication 3 MILLIGRAM(S): at 21:50

## 2025-08-05 RX ADMIN — INSULIN LISPRO 10 UNIT(S): 100 INJECTION, SOLUTION INTRAVENOUS; SUBCUTANEOUS at 19:52

## 2025-08-06 LAB
ANION GAP SERPL CALC-SCNC: 14 MMOL/L — SIGNIFICANT CHANGE UP (ref 5–17)
BUN SERPL-MCNC: 46 MG/DL — HIGH (ref 7–23)
CALCIUM SERPL-MCNC: 8.9 MG/DL — SIGNIFICANT CHANGE UP (ref 8.4–10.5)
CHLORIDE SERPL-SCNC: 100 MMOL/L — SIGNIFICANT CHANGE UP (ref 96–108)
CO2 SERPL-SCNC: 23 MMOL/L — SIGNIFICANT CHANGE UP (ref 22–31)
CREAT SERPL-MCNC: 1.84 MG/DL — HIGH (ref 0.5–1.3)
EGFR: 41 ML/MIN/1.73M2 — LOW
EGFR: 41 ML/MIN/1.73M2 — LOW
GLUCOSE BLDC GLUCOMTR-MCNC: 115 MG/DL — HIGH (ref 70–99)
GLUCOSE BLDC GLUCOMTR-MCNC: 162 MG/DL — HIGH (ref 70–99)
GLUCOSE BLDC GLUCOMTR-MCNC: 180 MG/DL — HIGH (ref 70–99)
GLUCOSE BLDC GLUCOMTR-MCNC: 97 MG/DL — SIGNIFICANT CHANGE UP (ref 70–99)
GLUCOSE SERPL-MCNC: 190 MG/DL — HIGH (ref 70–99)
MAGNESIUM SERPL-MCNC: 2.1 MG/DL — SIGNIFICANT CHANGE UP (ref 1.6–2.6)
POTASSIUM SERPL-MCNC: 3.7 MMOL/L — SIGNIFICANT CHANGE UP (ref 3.5–5.3)
POTASSIUM SERPL-SCNC: 3.7 MMOL/L — SIGNIFICANT CHANGE UP (ref 3.5–5.3)
SODIUM SERPL-SCNC: 137 MMOL/L — SIGNIFICANT CHANGE UP (ref 135–145)

## 2025-08-06 PROCEDURE — 80053 COMPREHEN METABOLIC PANEL: CPT

## 2025-08-06 PROCEDURE — 80048 BASIC METABOLIC PNL TOTAL CA: CPT

## 2025-08-06 PROCEDURE — 82330 ASSAY OF CALCIUM: CPT

## 2025-08-06 PROCEDURE — 87637 SARSCOV2&INF A&B&RSV AMP PRB: CPT

## 2025-08-06 PROCEDURE — 82947 ASSAY GLUCOSE BLOOD QUANT: CPT

## 2025-08-06 PROCEDURE — 85730 THROMBOPLASTIN TIME PARTIAL: CPT

## 2025-08-06 PROCEDURE — 84100 ASSAY OF PHOSPHORUS: CPT

## 2025-08-06 PROCEDURE — 94640 AIRWAY INHALATION TREATMENT: CPT

## 2025-08-06 PROCEDURE — 85027 COMPLETE CBC AUTOMATED: CPT

## 2025-08-06 PROCEDURE — 82803 BLOOD GASES ANY COMBINATION: CPT

## 2025-08-06 PROCEDURE — 84484 ASSAY OF TROPONIN QUANT: CPT

## 2025-08-06 PROCEDURE — 97161 PT EVAL LOW COMPLEX 20 MIN: CPT

## 2025-08-06 PROCEDURE — 85014 HEMATOCRIT: CPT

## 2025-08-06 PROCEDURE — 84295 ASSAY OF SERUM SODIUM: CPT

## 2025-08-06 PROCEDURE — 85018 HEMOGLOBIN: CPT

## 2025-08-06 PROCEDURE — 84132 ASSAY OF SERUM POTASSIUM: CPT

## 2025-08-06 PROCEDURE — 83605 ASSAY OF LACTIC ACID: CPT

## 2025-08-06 PROCEDURE — 71250 CT THORAX DX C-: CPT

## 2025-08-06 PROCEDURE — 82962 GLUCOSE BLOOD TEST: CPT

## 2025-08-06 PROCEDURE — 83880 ASSAY OF NATRIURETIC PEPTIDE: CPT

## 2025-08-06 PROCEDURE — 85025 COMPLETE CBC W/AUTO DIFF WBC: CPT

## 2025-08-06 PROCEDURE — 85610 PROTHROMBIN TIME: CPT

## 2025-08-06 PROCEDURE — 83735 ASSAY OF MAGNESIUM: CPT

## 2025-08-06 PROCEDURE — 93005 ELECTROCARDIOGRAM TRACING: CPT

## 2025-08-06 PROCEDURE — 71045 X-RAY EXAM CHEST 1 VIEW: CPT

## 2025-08-06 PROCEDURE — 82435 ASSAY OF BLOOD CHLORIDE: CPT

## 2025-08-06 PROCEDURE — C8929: CPT

## 2025-08-06 PROCEDURE — 94660 CPAP INITIATION&MGMT: CPT

## 2025-08-06 RX ADMIN — BUMETANIDE 132 MILLIGRAM(S): 1 TABLET ORAL at 13:52

## 2025-08-06 RX ADMIN — TAMSULOSIN HYDROCHLORIDE 0.4 MILLIGRAM(S): 0.4 CAPSULE ORAL at 21:45

## 2025-08-06 RX ADMIN — SACUBITRIL AND VALSARTAN 1 TABLET(S): 6; 6 PELLET ORAL at 17:07

## 2025-08-06 RX ADMIN — INSULIN LISPRO 1: 100 INJECTION, SOLUTION INTRAVENOUS; SUBCUTANEOUS at 08:32

## 2025-08-06 RX ADMIN — Medication 81 MILLIGRAM(S): at 13:51

## 2025-08-06 RX ADMIN — INSULIN LISPRO 10 UNIT(S): 100 INJECTION, SOLUTION INTRAVENOUS; SUBCUTANEOUS at 17:41

## 2025-08-06 RX ADMIN — Medication 25 MILLIGRAM(S): at 05:25

## 2025-08-06 RX ADMIN — SACUBITRIL AND VALSARTAN 1 TABLET(S): 6; 6 PELLET ORAL at 05:26

## 2025-08-06 RX ADMIN — METOPROLOL SUCCINATE 25 MILLIGRAM(S): 50 TABLET, EXTENDED RELEASE ORAL at 05:24

## 2025-08-06 RX ADMIN — HEPARIN SODIUM 5000 UNIT(S): 1000 INJECTION INTRAVENOUS; SUBCUTANEOUS at 05:25

## 2025-08-06 RX ADMIN — INSULIN LISPRO 10 UNIT(S): 100 INJECTION, SOLUTION INTRAVENOUS; SUBCUTANEOUS at 08:32

## 2025-08-06 RX ADMIN — INSULIN GLARGINE-YFGN 30 UNIT(S): 100 INJECTION, SOLUTION SUBCUTANEOUS at 21:45

## 2025-08-06 RX ADMIN — Medication 1 DOSE(S): at 17:08

## 2025-08-06 RX ADMIN — HEPARIN SODIUM 5000 UNIT(S): 1000 INJECTION INTRAVENOUS; SUBCUTANEOUS at 17:07

## 2025-08-06 RX ADMIN — Medication 1 DOSE(S): at 05:25

## 2025-08-06 RX ADMIN — ROSUVASTATIN CALCIUM 20 MILLIGRAM(S): 20 TABLET, FILM COATED ORAL at 21:45

## 2025-08-06 RX ADMIN — INSULIN LISPRO 10 UNIT(S): 100 INJECTION, SOLUTION INTRAVENOUS; SUBCUTANEOUS at 11:47

## 2025-08-06 RX ADMIN — BUMETANIDE 132 MILLIGRAM(S): 1 TABLET ORAL at 05:27

## 2025-08-07 LAB
ANION GAP SERPL CALC-SCNC: 15 MMOL/L — SIGNIFICANT CHANGE UP (ref 5–17)
BUN SERPL-MCNC: 49 MG/DL — HIGH (ref 7–23)
CALCIUM SERPL-MCNC: 9 MG/DL — SIGNIFICANT CHANGE UP (ref 8.4–10.5)
CHLORIDE SERPL-SCNC: 99 MMOL/L — SIGNIFICANT CHANGE UP (ref 96–108)
CO2 SERPL-SCNC: 24 MMOL/L — SIGNIFICANT CHANGE UP (ref 22–31)
CREAT SERPL-MCNC: 1.9 MG/DL — HIGH (ref 0.5–1.3)
EGFR: 39 ML/MIN/1.73M2 — LOW
EGFR: 39 ML/MIN/1.73M2 — LOW
GLUCOSE BLDC GLUCOMTR-MCNC: 125 MG/DL — HIGH (ref 70–99)
GLUCOSE BLDC GLUCOMTR-MCNC: 136 MG/DL — HIGH (ref 70–99)
GLUCOSE BLDC GLUCOMTR-MCNC: 192 MG/DL — HIGH (ref 70–99)
GLUCOSE BLDC GLUCOMTR-MCNC: 91 MG/DL — SIGNIFICANT CHANGE UP (ref 70–99)
GLUCOSE SERPL-MCNC: 131 MG/DL — HIGH (ref 70–99)
HCT VFR BLD CALC: 35.3 % — LOW (ref 39–50)
HGB BLD-MCNC: 10.4 G/DL — LOW (ref 13–17)
MAGNESIUM SERPL-MCNC: 2.1 MG/DL — SIGNIFICANT CHANGE UP (ref 1.6–2.6)
MCHC RBC-ENTMCNC: 21.9 PG — LOW (ref 27–34)
MCHC RBC-ENTMCNC: 29.5 G/DL — LOW (ref 32–36)
MCV RBC AUTO: 74.5 FL — LOW (ref 80–100)
NRBC # BLD AUTO: 0 K/UL — SIGNIFICANT CHANGE UP (ref 0–0)
NRBC # FLD: 0 K/UL — SIGNIFICANT CHANGE UP (ref 0–0)
NRBC BLD AUTO-RTO: 0 /100 WBCS — SIGNIFICANT CHANGE UP (ref 0–0)
PLATELET # BLD AUTO: 116 K/UL — LOW (ref 150–400)
PMV BLD: 10.4 FL — SIGNIFICANT CHANGE UP (ref 7–13)
POTASSIUM SERPL-MCNC: 3.6 MMOL/L — SIGNIFICANT CHANGE UP (ref 3.5–5.3)
POTASSIUM SERPL-SCNC: 3.6 MMOL/L — SIGNIFICANT CHANGE UP (ref 3.5–5.3)
RBC # BLD: 4.74 M/UL — SIGNIFICANT CHANGE UP (ref 4.2–5.8)
RBC # FLD: 17.4 % — HIGH (ref 10.3–14.5)
SODIUM SERPL-SCNC: 138 MMOL/L — SIGNIFICANT CHANGE UP (ref 135–145)
WBC # BLD: 6.46 K/UL — SIGNIFICANT CHANGE UP (ref 3.8–10.5)
WBC # FLD AUTO: 6.46 K/UL — SIGNIFICANT CHANGE UP (ref 3.8–10.5)

## 2025-08-07 RX ORDER — BUMETANIDE 1 MG/1
2 TABLET ORAL ONCE
Refills: 0 | Status: COMPLETED | OUTPATIENT
Start: 2025-08-07 | End: 2025-08-07

## 2025-08-07 RX ADMIN — INSULIN LISPRO 10 UNIT(S): 100 INJECTION, SOLUTION INTRAVENOUS; SUBCUTANEOUS at 08:02

## 2025-08-07 RX ADMIN — Medication 1 DOSE(S): at 05:01

## 2025-08-07 RX ADMIN — Medication 4 MILLIGRAM(S): at 04:47

## 2025-08-07 RX ADMIN — HEPARIN SODIUM 5000 UNIT(S): 1000 INJECTION INTRAVENOUS; SUBCUTANEOUS at 05:00

## 2025-08-07 RX ADMIN — INSULIN LISPRO 10 UNIT(S): 100 INJECTION, SOLUTION INTRAVENOUS; SUBCUTANEOUS at 11:45

## 2025-08-07 RX ADMIN — BUMETANIDE 2 MILLIGRAM(S): 1 TABLET ORAL at 13:16

## 2025-08-07 RX ADMIN — INSULIN LISPRO 10 UNIT(S): 100 INJECTION, SOLUTION INTRAVENOUS; SUBCUTANEOUS at 17:45

## 2025-08-07 RX ADMIN — TAMSULOSIN HYDROCHLORIDE 0.4 MILLIGRAM(S): 0.4 CAPSULE ORAL at 21:26

## 2025-08-07 RX ADMIN — INSULIN GLARGINE-YFGN 30 UNIT(S): 100 INJECTION, SOLUTION SUBCUTANEOUS at 21:26

## 2025-08-07 RX ADMIN — Medication 1 DOSE(S): at 17:45

## 2025-08-07 RX ADMIN — HEPARIN SODIUM 5000 UNIT(S): 1000 INJECTION INTRAVENOUS; SUBCUTANEOUS at 17:45

## 2025-08-07 RX ADMIN — METOPROLOL SUCCINATE 25 MILLIGRAM(S): 50 TABLET, EXTENDED RELEASE ORAL at 05:00

## 2025-08-07 RX ADMIN — Medication 20 MILLIEQUIVALENT(S): at 19:26

## 2025-08-07 RX ADMIN — Medication 81 MILLIGRAM(S): at 17:45

## 2025-08-07 RX ADMIN — SACUBITRIL AND VALSARTAN 1 TABLET(S): 6; 6 PELLET ORAL at 05:00

## 2025-08-07 RX ADMIN — BUMETANIDE 132 MILLIGRAM(S): 1 TABLET ORAL at 05:00

## 2025-08-07 RX ADMIN — Medication 25 MILLIGRAM(S): at 05:01

## 2025-08-07 RX ADMIN — ROSUVASTATIN CALCIUM 20 MILLIGRAM(S): 20 TABLET, FILM COATED ORAL at 21:26

## 2025-08-08 ENCOUNTER — TRANSCRIPTION ENCOUNTER (OUTPATIENT)
Age: 65
End: 2025-08-08

## 2025-08-08 VITALS
DIASTOLIC BLOOD PRESSURE: 66 MMHG | SYSTOLIC BLOOD PRESSURE: 103 MMHG | HEART RATE: 90 BPM | RESPIRATION RATE: 18 BRPM | TEMPERATURE: 98 F | OXYGEN SATURATION: 93 %

## 2025-08-08 LAB
ANION GAP SERPL CALC-SCNC: 15 MMOL/L — SIGNIFICANT CHANGE UP (ref 5–17)
BUN SERPL-MCNC: 61 MG/DL — HIGH (ref 7–23)
CALCIUM SERPL-MCNC: 8.4 MG/DL — SIGNIFICANT CHANGE UP (ref 8.4–10.5)
CHLORIDE SERPL-SCNC: 97 MMOL/L — SIGNIFICANT CHANGE UP (ref 96–108)
CO2 SERPL-SCNC: 22 MMOL/L — SIGNIFICANT CHANGE UP (ref 22–31)
CREAT SERPL-MCNC: 2.31 MG/DL — HIGH (ref 0.5–1.3)
EGFR: 31 ML/MIN/1.73M2 — LOW
EGFR: 31 ML/MIN/1.73M2 — LOW
GLUCOSE BLDC GLUCOMTR-MCNC: 156 MG/DL — HIGH (ref 70–99)
GLUCOSE BLDC GLUCOMTR-MCNC: 218 MG/DL — HIGH (ref 70–99)
GLUCOSE SERPL-MCNC: 140 MG/DL — HIGH (ref 70–99)
MAGNESIUM SERPL-MCNC: 2.2 MG/DL — SIGNIFICANT CHANGE UP (ref 1.6–2.6)
POTASSIUM SERPL-MCNC: 4 MMOL/L — SIGNIFICANT CHANGE UP (ref 3.5–5.3)
POTASSIUM SERPL-SCNC: 4 MMOL/L — SIGNIFICANT CHANGE UP (ref 3.5–5.3)
SODIUM SERPL-SCNC: 134 MMOL/L — LOW (ref 135–145)

## 2025-08-08 PROCEDURE — 85610 PROTHROMBIN TIME: CPT

## 2025-08-08 PROCEDURE — 87637 SARSCOV2&INF A&B&RSV AMP PRB: CPT

## 2025-08-08 PROCEDURE — 84484 ASSAY OF TROPONIN QUANT: CPT

## 2025-08-08 PROCEDURE — 71250 CT THORAX DX C-: CPT

## 2025-08-08 PROCEDURE — 94660 CPAP INITIATION&MGMT: CPT

## 2025-08-08 PROCEDURE — 99285 EMERGENCY DEPT VISIT HI MDM: CPT

## 2025-08-08 PROCEDURE — 97161 PT EVAL LOW COMPLEX 20 MIN: CPT

## 2025-08-08 PROCEDURE — 82962 GLUCOSE BLOOD TEST: CPT

## 2025-08-08 PROCEDURE — 80053 COMPREHEN METABOLIC PANEL: CPT

## 2025-08-08 PROCEDURE — 94640 AIRWAY INHALATION TREATMENT: CPT

## 2025-08-08 PROCEDURE — 83605 ASSAY OF LACTIC ACID: CPT

## 2025-08-08 PROCEDURE — 96374 THER/PROPH/DIAG INJ IV PUSH: CPT

## 2025-08-08 PROCEDURE — 85027 COMPLETE CBC AUTOMATED: CPT

## 2025-08-08 PROCEDURE — 93005 ELECTROCARDIOGRAM TRACING: CPT

## 2025-08-08 PROCEDURE — 83880 ASSAY OF NATRIURETIC PEPTIDE: CPT

## 2025-08-08 PROCEDURE — 82330 ASSAY OF CALCIUM: CPT

## 2025-08-08 PROCEDURE — 83735 ASSAY OF MAGNESIUM: CPT

## 2025-08-08 PROCEDURE — 85018 HEMOGLOBIN: CPT

## 2025-08-08 PROCEDURE — 85730 THROMBOPLASTIN TIME PARTIAL: CPT

## 2025-08-08 PROCEDURE — 71045 X-RAY EXAM CHEST 1 VIEW: CPT

## 2025-08-08 PROCEDURE — 85025 COMPLETE CBC W/AUTO DIFF WBC: CPT

## 2025-08-08 PROCEDURE — C8929: CPT

## 2025-08-08 PROCEDURE — 84295 ASSAY OF SERUM SODIUM: CPT

## 2025-08-08 PROCEDURE — 82947 ASSAY GLUCOSE BLOOD QUANT: CPT

## 2025-08-08 PROCEDURE — 84100 ASSAY OF PHOSPHORUS: CPT

## 2025-08-08 PROCEDURE — 96375 TX/PRO/DX INJ NEW DRUG ADDON: CPT

## 2025-08-08 PROCEDURE — 80048 BASIC METABOLIC PNL TOTAL CA: CPT

## 2025-08-08 PROCEDURE — 82435 ASSAY OF BLOOD CHLORIDE: CPT

## 2025-08-08 PROCEDURE — 84132 ASSAY OF SERUM POTASSIUM: CPT

## 2025-08-08 PROCEDURE — 85014 HEMATOCRIT: CPT

## 2025-08-08 PROCEDURE — 82803 BLOOD GASES ANY COMBINATION: CPT

## 2025-08-08 RX ADMIN — Medication 25 MILLIGRAM(S): at 05:04

## 2025-08-08 RX ADMIN — INSULIN LISPRO 10 UNIT(S): 100 INJECTION, SOLUTION INTRAVENOUS; SUBCUTANEOUS at 11:28

## 2025-08-08 RX ADMIN — Medication 81 MILLIGRAM(S): at 11:28

## 2025-08-08 RX ADMIN — Medication 1 DOSE(S): at 05:04

## 2025-08-08 RX ADMIN — INSULIN LISPRO 10 UNIT(S): 100 INJECTION, SOLUTION INTRAVENOUS; SUBCUTANEOUS at 07:46

## 2025-08-08 RX ADMIN — INSULIN LISPRO 1: 100 INJECTION, SOLUTION INTRAVENOUS; SUBCUTANEOUS at 07:45

## 2025-08-08 RX ADMIN — METOPROLOL SUCCINATE 25 MILLIGRAM(S): 50 TABLET, EXTENDED RELEASE ORAL at 05:04

## 2025-08-08 RX ADMIN — BUMETANIDE 132 MILLIGRAM(S): 1 TABLET ORAL at 05:04

## 2025-08-08 RX ADMIN — HEPARIN SODIUM 5000 UNIT(S): 1000 INJECTION INTRAVENOUS; SUBCUTANEOUS at 05:04

## 2025-08-08 RX ADMIN — INSULIN LISPRO 2: 100 INJECTION, SOLUTION INTRAVENOUS; SUBCUTANEOUS at 11:28

## 2025-08-18 NOTE — ED ADULT TRIAGE NOTE - NS ED NURSE BANDS TYPE
[FreeTextEntry1] : Stable cardiac status. Contin current meds. Follow up results of cardiac monitor. Follow up 2 months.  ADDENDUM 8/18/25 - NO CARDIAC CONTRAINDICATIONS TO EPIDURAL PROCEDURE FOR PAIN MANGEMENT. CONTINUE ASPIRIN ADRIAN-PROCEDURE.    Name band;

## (undated) DEVICE — SUT PLAIN GUT 4-0 18" P-12

## (undated) DEVICE — WARMING BLANKET UPPER ADULT

## (undated) DEVICE — SYR ASEPTO

## (undated) DEVICE — DRSG KLING 4"

## (undated) DEVICE — PACKING GAUZE IODOFORM 2"

## (undated) DEVICE — MARKING PEN W RULER

## (undated) DEVICE — WARMING BLANKET DUO-THERM HYPER/HYPOTHERM ADULT

## (undated) DEVICE — PACK UNIVERSAL CARDIAC

## (undated) DEVICE — DRSG COMBINE 5X9"

## (undated) DEVICE — SYR LUER LOK 20CC

## (undated) DEVICE — SUT SURGICAL STEEL 6 30" BP-1

## (undated) DEVICE — DRAPE ISOLATION BAG 20X20"

## (undated) DEVICE — POSITIONER FOAM EGG CRATE ULNAR 2PCS (PINK)

## (undated) DEVICE — DRAPE TOWEL BLUE 17" X 24"

## (undated) DEVICE — DRAPE 1/2 SHEET 40X57"

## (undated) DEVICE — NDL HYPO REGULAR BEVEL 22G X 1.5" (TURQUOISE)

## (undated) DEVICE — SYR LUER LOK 10CC

## (undated) DEVICE — SUT STAINLESS STEEL 5 18" SCC

## (undated) DEVICE — STAPLER SKIN VISI-STAT 35 WIDE

## (undated) DEVICE — LAP PAD 18 X 18"

## (undated) DEVICE — STEALTH CLAMP INSERT FIBRA/FIBRA 60MM

## (undated) DEVICE — Device

## (undated) DEVICE — SUT BLUNT SZ 5

## (undated) DEVICE — BLADE SCALPEL SAFETYLOCK #15

## (undated) DEVICE — SOL IRR POUR NS 0.9% 500ML

## (undated) DEVICE — NDL HYPO REGULAR BEVEL 25G X 1.5" (BLUE)

## (undated) DEVICE — NDL HYPO SAFE 25G X 5/8" (ORANGE)

## (undated) DEVICE — DRAPE MAYO STAND 30"

## (undated) DEVICE — ELCTR DEFIB PAD PRO-PADZ W 10FT LEAD WIRES ADULT

## (undated) DEVICE — FOLEY TRAY 16FR 5CC LTX UMETER CLOSED

## (undated) DEVICE — GLV 7.5 PROTEXIS (BLUE)

## (undated) DEVICE — SUT DOUBLE 6 WIRE STERNAL

## (undated) DEVICE — PREP CHLORAPREP HI-LITE ORANGE 26ML

## (undated) DEVICE — DRSG STOCKINETTE IMPERVIOUS XL

## (undated) DEVICE — STRYKER INTERPULSE HANDPIECE W IRR SUCTION TUBE

## (undated) DEVICE — SUT POLYSORB 2-0 30" GS-21 UNDYED

## (undated) DEVICE — SAW BLADE MICROAIRE SAGITTAL 9.4MMX25.4MMX0.6MM

## (undated) DEVICE — SAW BLADE MICROAIRE OSCILATING 25.4MM X 90MM X 1.27MM

## (undated) DEVICE — DRSG STERISTRIPS 0.5 X 4"

## (undated) DEVICE — PACK EXTREMITY

## (undated) DEVICE — DRAPE C ARM UNIVERSAL

## (undated) DEVICE — STEALTH CLAMP INSERT FIBRA/FIBRA 90MM

## (undated) DEVICE — SUT SILK 0 30" CT-1

## (undated) DEVICE — DRSG CURITY GAUZE SPONGE 4 X 4" 12-PLY

## (undated) DEVICE — MEDICATION LABELS W MARKER

## (undated) DEVICE — DRSG ACE BANDAGE 6"

## (undated) DEVICE — SAW BLADE MICROAIRE STERNUM 1X34X9.4MM

## (undated) DEVICE — DRAPE IOBAN 23" X 23"

## (undated) DEVICE — SOL IRR POUR H2O 250ML

## (undated) DEVICE — DRAPE LIGHT HANDLE COVER (BLUE)

## (undated) DEVICE — SPECIMEN CONTAINER 100ML

## (undated) DEVICE — VENODYNE/SCD SLEEVE CALF LARGE

## (undated) DEVICE — SUT BIOSYN 4-0 18" P-12

## (undated) DEVICE — PACING CABLE SURGICAL 12FT WITH SMALL CLIP

## (undated) DEVICE — VISITEC 4X4

## (undated) DEVICE — DRSG DERMABOND PRINEO 60CM

## (undated) DEVICE — SUT POLYSORB 0 36" GS-25 UNDYED

## (undated) DEVICE — DRSG OPSITE 13.75 X 4"

## (undated) DEVICE — SUT SILK 5-0 60" TIES

## (undated) DEVICE — SOL NORMOSOL-R PH7.4 1000ML

## (undated) DEVICE — DRAPE INSTRUMENT POUCH 6.75" X 11"

## (undated) DEVICE — DRAPE 3/4 SHEET W REINFORCEMENT 56X77"

## (undated) DEVICE — PACKING GAUZE PLAIN 0.5"

## (undated) DEVICE — ELCTR BOVIE PENCIL HANDPIECE ROCKER SWITCH 15FT

## (undated) DEVICE — DRSG MASTISOL

## (undated) DEVICE — DRSG TEGADERM 2.5X3"

## (undated) DEVICE — VENODYNE/SCD SLEEVE CALF MEDIUM

## (undated) DEVICE — WARMING BLANKET FULL UNDERBODY

## (undated) DEVICE — FOLEY TRAY 16FR 5CC LF LUBRISIL ADVANCE TEMP CLOSED

## (undated) DEVICE — PACK CARDIAC MINOR

## (undated) DEVICE — SUT SOFSILK 2-0 18" TIES

## (undated) DEVICE — BUR STRYKER OVAL SOLID CARBIDE MED 4MM

## (undated) DEVICE — DRSG XEROFORM 1 X 8"

## (undated) DEVICE — PACKING GAUZE PLAIN 2"

## (undated) DEVICE — SUT VICRYL 2-0 27" CT-1 UNDYED

## (undated) DEVICE — DRAPE CAMERA HANDLE COVER

## (undated) DEVICE — SUT SOFSILK 0 30" V-20

## (undated) DEVICE — SUT SOFSILK 0 18" TIES

## (undated) DEVICE — GLV 8 PROTEXIS (WHITE)

## (undated) DEVICE — SUT ETHIBOND 0 44" EN3

## (undated) DEVICE — SUT MONOCRYL 4-0 18" PS-2

## (undated) DEVICE — KIT MEDTRONIC WRENCH CARD LEAD DISP

## (undated) DEVICE — DRSG STOCKINETTE IMPERVIOUS MED

## (undated) DEVICE — NDL HYPO SAFE 22G X 1.5" (BLACK)

## (undated) DEVICE — BLADE SCALPEL SAFETYLOCK #10

## (undated) DEVICE — SUCTION YANKAUER NO CONTROL VENT

## (undated) DEVICE — PREP BETADINE KIT

## (undated) DEVICE — DRAPE MAGNETIC INSTRUMENT MEDIUM

## (undated) DEVICE — BLADE SCALPEL SAFETYLOCK #11

## (undated) DEVICE — SAW BLADE STRYKER STERNUM 31MM X 6.27 X .79

## (undated) DEVICE — SUT PLEDGET PRE PUNCH 4.8 X 9.5 X 1.5 MM

## (undated) DEVICE — SUT VICRYL 3-0 27" CT-1